# Patient Record
Sex: FEMALE | Race: WHITE | NOT HISPANIC OR LATINO | Employment: UNEMPLOYED | URBAN - METROPOLITAN AREA
[De-identification: names, ages, dates, MRNs, and addresses within clinical notes are randomized per-mention and may not be internally consistent; named-entity substitution may affect disease eponyms.]

---

## 2017-01-26 ENCOUNTER — ALLSCRIPTS OFFICE VISIT (OUTPATIENT)
Dept: OTHER | Facility: OTHER | Age: 57
End: 2017-01-26

## 2017-02-24 ENCOUNTER — ALLSCRIPTS OFFICE VISIT (OUTPATIENT)
Dept: OTHER | Facility: OTHER | Age: 57
End: 2017-02-24

## 2017-07-14 ENCOUNTER — ALLSCRIPTS OFFICE VISIT (OUTPATIENT)
Dept: OTHER | Facility: OTHER | Age: 57
End: 2017-07-14

## 2017-07-14 DIAGNOSIS — F41.9 ANXIETY DISORDER: ICD-10-CM

## 2017-07-14 DIAGNOSIS — E78.49 OTHER HYPERLIPIDEMIA: ICD-10-CM

## 2017-07-14 DIAGNOSIS — F32.9 MAJOR DEPRESSIVE DISORDER, SINGLE EPISODE: ICD-10-CM

## 2017-07-14 DIAGNOSIS — R73.9 HYPERGLYCEMIA: ICD-10-CM

## 2017-07-14 DIAGNOSIS — E78.5 HYPERLIPIDEMIA: ICD-10-CM

## 2017-07-14 DIAGNOSIS — I10 ESSENTIAL (PRIMARY) HYPERTENSION: ICD-10-CM
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
## 2017-08-01 ENCOUNTER — ALLSCRIPTS OFFICE VISIT (OUTPATIENT)
Dept: OTHER | Facility: OTHER | Age: 57
End: 2017-08-01

## 2017-09-25 ENCOUNTER — ALLSCRIPTS OFFICE VISIT (OUTPATIENT)
Dept: OTHER | Facility: OTHER | Age: 57
End: 2017-09-25

## 2017-09-25 DIAGNOSIS — R10.2 PELVIC AND PERINEAL PAIN: ICD-10-CM

## 2017-09-25 DIAGNOSIS — Z12.31 ENCOUNTER FOR SCREENING MAMMOGRAM FOR MALIGNANT NEOPLASM OF BREAST: ICD-10-CM

## 2017-09-28 ENCOUNTER — GENERIC CONVERSION - ENCOUNTER (OUTPATIENT)
Dept: OTHER | Facility: OTHER | Age: 57
End: 2017-09-28

## 2017-09-28 LAB
ADDITIONAL INFORMATION (HISTORICAL): NORMAL
ADEQUACY: (HISTORICAL): NORMAL
CYTOTECHNOLOGIST: (HISTORICAL): NORMAL
HPV MRNA E6/E7 (HISTORICAL): NOT DETECTED
INTERPRETATION (HISTORICAL): NORMAL
LMP (HISTORICAL): NORMAL
PREV. BX: (HISTORICAL): NORMAL
PREV. PAP (HISTORICAL): NORMAL
SOURCE (HISTORICAL): NORMAL

## 2017-11-15 ENCOUNTER — LAB CONVERSION - ENCOUNTER (OUTPATIENT)
Dept: OTHER | Facility: OTHER | Age: 57
End: 2017-11-15

## 2017-11-15 ENCOUNTER — GENERIC CONVERSION - ENCOUNTER (OUTPATIENT)
Dept: OTHER | Facility: OTHER | Age: 57
End: 2017-11-15

## 2017-11-15 DIAGNOSIS — I20.9 ANGINA PECTORIS (HCC): ICD-10-CM

## 2017-11-15 DIAGNOSIS — E78.49 OTHER HYPERLIPIDEMIA: ICD-10-CM

## 2017-11-15 LAB
A/G RATIO (HISTORICAL): 1.8 (CALC) (ref 1–2.5)
ALBUMIN SERPL BCP-MCNC: 4.5 G/DL (ref 3.6–5.1)
ALP SERPL-CCNC: 75 U/L (ref 33–130)
ALT SERPL W P-5'-P-CCNC: 12 U/L (ref 6–29)
AST SERPL W P-5'-P-CCNC: 13 U/L (ref 10–35)
BASOPHILS # BLD AUTO: 1 %
BASOPHILS # BLD AUTO: 52 CELLS/UL (ref 0–200)
BILIRUB SERPL-MCNC: 0.3 MG/DL (ref 0.2–1.2)
BUN SERPL-MCNC: 24 MG/DL (ref 7–25)
BUN/CREA RATIO (HISTORICAL): ABNORMAL (CALC) (ref 6–22)
C-REACTIVE PROTEIN (HISTORICAL): 3.9 MG/L
CALCIUM SERPL-MCNC: 9.4 MG/DL (ref 8.6–10.4)
CHLORIDE SERPL-SCNC: 104 MMOL/L (ref 98–110)
CHOLEST SERPL-MCNC: 413 MG/DL
CHOLEST/HDLC SERPL: 4.4 (CALC)
CO2 SERPL-SCNC: 28 MMOL/L (ref 20–31)
CREAT SERPL-MCNC: 0.83 MG/DL (ref 0.5–1.05)
DEPRECATED RDW RBC AUTO: 11.9 % (ref 11–15)
EGFR AFRICAN AMERICAN (HISTORICAL): 89 ML/MIN/1.73M2
EGFR-AMERICAN CALC (HISTORICAL): 77 ML/MIN/1.73M2
EOSINOPHIL # BLD AUTO: 333 CELLS/UL (ref 15–500)
EOSINOPHIL # BLD AUTO: 6.4 %
ERYTHROCYTE SEDIMENTATION RATE (HISTORICAL): 11 MM/H
GAMMA GLOBULIN (HISTORICAL): 2.5 G/DL (CALC) (ref 1.9–3.7)
GLUCOSE (HISTORICAL): 104 MG/DL (ref 65–99)
HBA1C MFR BLD HPLC: 4.9 % OF TOTAL HGB
HCT VFR BLD AUTO: 34 % (ref 35–45)
HDLC SERPL-MCNC: 94 MG/DL
HGB BLD-MCNC: 11.6 G/DL (ref 11.7–15.5)
LDL CHOLESTEROL (HISTORICAL): 287 MG/DL (CALC)
LYME IGG/IGM AB (HISTORICAL): <0.9 INDEX
LYMPHOCYTES # BLD AUTO: 1264 CELLS/UL (ref 850–3900)
LYMPHOCYTES # BLD AUTO: 24.3 %
MCH RBC QN AUTO: 31.4 PG (ref 27–33)
MCHC RBC AUTO-ENTMCNC: 34.1 G/DL (ref 32–36)
MCV RBC AUTO: 92.1 FL (ref 80–100)
MONOCYTES # BLD AUTO: 380 CELLS/UL (ref 200–950)
MONOCYTES (HISTORICAL): 7.3 %
NEUTROPHILS # BLD AUTO: 3172 CELLS/UL (ref 1500–7800)
NEUTROPHILS # BLD AUTO: 61 %
NON-HDL-CHOL (CHOL-HDL) (HISTORICAL): 319 MG/DL (CALC)
PLATELET # BLD AUTO: 261 THOUSAND/UL (ref 140–400)
PMV BLD AUTO: 9.5 FL (ref 7.5–12.5)
POTASSIUM SERPL-SCNC: 4 MMOL/L (ref 3.5–5.3)
RBC # BLD AUTO: 3.69 MILLION/UL (ref 3.8–5.1)
SODIUM SERPL-SCNC: 139 MMOL/L (ref 135–146)
TOTAL PROTEIN (HISTORICAL): 7 G/DL (ref 6.1–8.1)
TRIGL SERPL-MCNC: 159 MG/DL
TSH SERPL DL<=0.05 MIU/L-ACNC: 0.84 MIU/L (ref 0.4–4.5)
WBC # BLD AUTO: 5.2 THOUSAND/UL (ref 3.8–10.8)

## 2018-01-09 NOTE — RESULT NOTES
Message   Please call patient  Patient's Echo showed some minor things (e g  mild to moderate leaky aortic valve), but nothing dangerous or related to her symptoms  Can discuss more fully at her next visit  Thanks  NET     Verified Results  ECHO COMPLETE WITH CONTRAST IF INDICATED [id number] 12:33PM Jarod An Order Number: [id number]   Performing Comments: No contrast needed  - Patient Instructions: To schedule this appointment, please contact Central Scheduling at [phone]  For Frankey Dodrill, please call [phone]  Test Name Result Flag Reference   ECHO COMPLETE WITH CONTRAST IF INDICATED (Report)     [facility]   [facility], [address] [place] [address]   [phone]     Transthoracic Echocardiogram   2D, M-mode, Doppler, and Color Doppler     Study date: 2016     Patient: Essence Hooks   MR number: [id number]   Account number: [de-identified]   : 1960   Age: 60 years   Gender: Female   Status: Outpatient   Location: [address] [place] [address] and [facility]   Height: 65 in   Weight: 183 7 lb   BP: 128/ 80 mmHg     Indications: Amaurosis fugax     Diagnoses: G45 3 - Amaurosis fugax     Sonographer: PA Morrell   Primary Physician: Thiago Ashton MD   Referring Physician: Thiago Ashton MD   Group: [facility]   Interpreting Physician: Nichol Johns MD     SUMMARY     LEFT VENTRICLE:   Size was at the upper limits of normal  (79GJ)   Systolic function was normal  Ejection fraction was estimated to be 65 %  There were no regional wall motion abnormalities  MITRAL VALVE:   There was mild regurgitation  AORTIC VALVE:   There was mild to moderate regurgitation  TRICUSPID VALVE:   There was trace regurgitation  HISTORY: PRIOR HISTORY: Hypertension     PROCEDURE: The study was performed in the [address] [facility]  This was a routine study  The transthoracic approach was used   The study   included complete 2D imaging, M-mode, complete spectral Doppler, and color   Doppler  The heart rate was 57 bpm, at the start of the study  Images were   obtained from the parasternal, apical, subcostal, and suprasternal notch   acoustic windows  Image quality was adequate  LEFT VENTRICLE: Size was at the upper limits of normal  (80UQ) Systolic   function was normal  Ejection fraction was estimated to be 65 %  There were no   regional wall motion abnormalities  Wall thickness was normal  DOPPLER: Left   ventricular diastolic function parameters were normal      RIGHT VENTRICLE: The size was normal  Systolic function was normal  Wall   thickness was normal      LEFT ATRIUM: Size was normal      RIGHT ATRIUM: Size was normal      MITRAL VALVE: Valve structure was normal  There was normal leaflet separation  DOPPLER: The transmitral velocity was within the normal range  There was no   evidence for stenosis  There was mild regurgitation  AORTIC VALVE: The valve was trileaflet  Leaflets exhibited normal thickness and   normal cuspal separation  DOPPLER: Transaortic velocity was within the normal   range  There was no evidence for stenosis  There was mild to moderate   regurgitation  TRICUSPID VALVE: The valve structure was normal  There was normal leaflet   separation  DOPPLER: The transtricuspid velocity was within the normal range  There was no evidence for stenosis  There was trace regurgitation  PULMONIC VALVE: Leaflets exhibited normal thickness, no calcification, and   normal cuspal separation  DOPPLER: The transpulmonic velocity was within the   normal range  There was no regurgitation  PERICARDIUM: There was no pericardial effusion  AORTA: The root exhibited normal size  SYSTEMIC VEINS: IVC: The inferior vena cava was normal in size and course     Respirophasic changes were normal      SYSTEM MEASUREMENT TABLES     2D   %FS: 38 72 %   Ao Diam: 3 82 cm   EDV(Teich): 156 62 ml   EF Biplane: 66 98 %   EF(Cube): 76 99 %   EF(Teich): 68 4 %   ESV(Cube): 41 43 ml   ESV(Teich): 49 49 ml   IVSd: 0 9 cm   LA Area: 17 52 cm2   LA Diam: 4 14 cm   LVEDV MOD A2C: 71 23 ml   LVEDV MOD A4C: 68 53 ml   LVEDV MOD BP: 75 8 ml   LVEF MOD A2C: 67 1 %   LVEF MOD A4C: 61 41 %   LVESV MOD A2C: 23 43 ml   LVESV MOD A4C: 26 45 ml   LVESV MOD BP: 25 03 ml   LVIDd: 5 65 cm   LVIDs: 3 46 cm   LVLd A2C: 7 63 cm   LVLd A4C: 6 9 cm   LVLs A2C: 6 01 cm   LVLs A4C: 5 92 cm   LVPWd: 1 01 cm   RA Area: 13 45 cm2   RV Diam : 3 22 cm   SI(Cube): 72 57 ml/m2   SI(Teich): 56 09 ml/m2   SV MOD A2C: 47 8 ml   SV MOD A4C: 42 09 ml   SV(Cube): 138 61 ml   SV(Teich): 107 13 ml     CW   AR Dec [place]: 2 32 m/s2   AR Dec Time: 2037 79 ms   AR PHT: 590 96 ms   AR Vmax: 4 72 m/s   AR maxP 13 mmHg     MM   TAPSE: 2 63 cm     PW   E': 0 06 m/s   E/E': 11 14   MV A Johan: 0 65 m/s   MV Dec [place]: 3 91 m/s2   MV DecT: 179 98 ms   MV E Johan: 0 7 m/s   MV E/A Ratio: 1 08     Intersocietal Commission Accredited Echocardiography Laboratory     Prepared and electronically signed by     Imani Trivedi MD   Signed [id number] 14:54:20       Signatures   Electronically signed by : PRISCILA Perez ; Dec  1 2016 10:32AM EST                       (Author)

## 2018-01-13 VITALS
WEIGHT: 174.38 LBS | BODY MASS INDEX: 29.05 KG/M2 | HEIGHT: 65 IN | SYSTOLIC BLOOD PRESSURE: 120 MMHG | DIASTOLIC BLOOD PRESSURE: 80 MMHG

## 2018-01-13 VITALS
HEIGHT: 65 IN | WEIGHT: 176.5 LBS | DIASTOLIC BLOOD PRESSURE: 80 MMHG | BODY MASS INDEX: 29.41 KG/M2 | TEMPERATURE: 94 F | SYSTOLIC BLOOD PRESSURE: 136 MMHG

## 2018-01-13 VITALS
HEIGHT: 65 IN | BODY MASS INDEX: 29.37 KG/M2 | WEIGHT: 176.25 LBS | SYSTOLIC BLOOD PRESSURE: 118 MMHG | DIASTOLIC BLOOD PRESSURE: 70 MMHG

## 2018-01-13 NOTE — RESULT NOTES
Verified Results  (Q) THINPREP PAP AND HPV mRNA E6/E7 [id number] 12:00AM Adalidjax Anny     Test Name Result Flag Reference   HPV mRNA E6/E7 Not Detected  Not Detected   This test was performed using the APTIMA HPV Assay ([employer] )  This assay detects E6/E7 viral messenger RNA (mRNA) from 14  high-risk HPV types (16,18,31,33,35,39,45,51,52,56,58,59,66,68)  SOURCE:      [employer]   CLINICAL INFORMATION:      None given   LMP:      NONE GIVEN   PREV  PAP:      NONE GIVEN   PREV  BX:      NONE GIVEN   STATEMENT OF ADEQUACY:      Satisfactory for evaluation  Endocervical/transformation zone component  present  Age and/or menstrual status not provided   INTERPRETATION/RESULT:      Negative for intraepithelial lesion or malignancy     CYTOTECHNOLOGIST:      DAVID GONZALEZ(ASCP)  CT screening location: [facility], [address] [place] [address]

## 2018-01-14 VITALS
SYSTOLIC BLOOD PRESSURE: 128 MMHG | DIASTOLIC BLOOD PRESSURE: 80 MMHG | HEIGHT: 65 IN | BODY MASS INDEX: 2.85 KG/M2 | WEIGHT: 17.09 LBS

## 2018-01-18 NOTE — RESULT NOTES
Message   Please call patient  Carotid dopplers were normal  thanks  NET     Verified Results  ECHO COMPLETE WITH CONTRAST IF INDICATED [id number] 12:33PM Zee Sabillon Order Number: [id number]   Performing Comments: No contrast needed  - Patient Instructions: To schedule this appointment, please contact Central Scheduling at [phone]  For Leslye Vergara, please call [phone]  Test Name Result Flag Reference   ECHO COMPLETE WITH CONTRAST IF INDICATED (Report)     [facility]   [facility], [address] [facility]   [phone]     Transthoracic Echocardiogram   2D, M-mode, Doppler, and Color Doppler     Study date: 2016     Patient: Debbie Hyman   MR number: [id number]   Account number: [de-identified]   : 1960   Age: 60 years   Gender: Female   Status: Outpatient   Location: [address] [place] [address] [facility] [place]   Height: 65 in   Weight: 183 7 lb   BP: 128/ 80 mmHg     Indications: Amaurosis fugax     Diagnoses: G45 3 - Amaurosis fugax     Sonographer: PA Dias   Primary Physician: Morenita Rose MD   Referring Physician: Morenita Rose MD   Group: [facility]   Interpreting Physician: Bobby Brewer MD     SUMMARY     LEFT VENTRICLE:   Size was at the upper limits of normal  (76XU)   Systolic function was normal  Ejection fraction was estimated to be 65 %  There were no regional wall motion abnormalities  MITRAL VALVE:   There was mild regurgitation  AORTIC VALVE:   There was mild to moderate regurgitation  TRICUSPID VALVE:   There was trace regurgitation  HISTORY: PRIOR HISTORY: Hypertension     PROCEDURE: The study was performed in the [address]  This was a routine study  The transthoracic approach was used  The study   included complete 2D imaging, M-mode, complete spectral Doppler, and color   Doppler  The heart rate was 57 bpm, at the start of the study  Images were   obtained from the parasternal, apical, subcostal, and suprasternal notch   acoustic windows  Image quality was adequate  LEFT VENTRICLE: Size was at the upper limits of normal  (54JY) Systolic   function was normal  Ejection fraction was estimated to be 65 %  There were no   regional wall motion abnormalities  Wall thickness was normal  DOPPLER: Left   ventricular diastolic function parameters were normal      RIGHT VENTRICLE: The size was normal  Systolic function was normal  Wall   thickness was normal      LEFT ATRIUM: Size was normal      RIGHT ATRIUM: Size was normal      MITRAL VALVE: Valve structure was normal  There was normal leaflet separation  DOPPLER: The transmitral velocity was within the normal range  There was no   evidence for stenosis  There was mild regurgitation  AORTIC VALVE: The valve was trileaflet  Leaflets exhibited normal thickness and   normal cuspal separation  DOPPLER: Transaortic velocity was within the normal   range  There was no evidence for stenosis  There was mild to moderate   regurgitation  TRICUSPID VALVE: The valve structure was normal  There was normal leaflet   separation  DOPPLER: The transtricuspid velocity was within the normal range  There was no evidence for stenosis  There was trace regurgitation  PULMONIC VALVE: Leaflets exhibited normal thickness, no calcification, and   normal cuspal separation  DOPPLER: The transpulmonic velocity was within the   normal range  There was no regurgitation  PERICARDIUM: There was no pericardial effusion  AORTA: The root exhibited normal size  SYSTEMIC VEINS: IVC: The inferior vena cava was normal in size and course     Respirophasic changes were normal      SYSTEM MEASUREMENT TABLES     2D   %FS: 38 72 %   Ao Diam: 3 82 cm   EDV(Teich): 156 62 ml   EF Biplane: 66 98 %   EF(Cube): 76 99 %   EF(Teich): 68 4 %   ESV(Cube): 41 43 ml   ESV(Teich): 49 49 ml   IVSd: 0 9 cm   LA Area: 17 52 cm2   LA Diam: 4 14 cm   LVEDV MOD A2C: 71 23 ml   LVEDV MOD A4C: 68 53 ml   LVEDV MOD BP: 75 8 ml   LVEF MOD A2C: 67 1 %   LVEF MOD A4C: 61 41 %   LVESV MOD A2C: 23 43 ml   LVESV MOD A4C: 26 45 ml   LVESV MOD BP: 25 03 ml   LVIDd: 5 65 cm   LVIDs: 3 46 cm   LVLd A2C: 7 63 cm   LVLd A4C: 6 9 cm   LVLs A2C: 6 01 cm   LVLs A4C: 5 92 cm   LVPWd: 1 01 cm   RA Area: 13 45 cm2   RV Diam : 3 22 cm   SI(Cube): 72 57 ml/m2   SI(Teich): 56 09 ml/m2   SV MOD A2C: 47 8 ml   SV MOD A4C: 42 09 ml   SV(Cube): 138 61 ml   SV(Teich): 107 13 ml     CW   AR Dec [place]: 2 32 m/s2   AR Dec Time: 2037 79 ms   AR PHT: 590 96 ms   AR Vmax: 4 72 m/s   AR maxP 13 mmHg     MM   TAPSE: 2 63 cm     PW   E': 0 06 m/s   E/E': 11 14   MV A Johan: 0 65 m/s   MV Dec [place]: 3 91 m/s2   MV DecT: 179 98 ms   MV E Johan: 0 7 m/s   MV E/A Ratio: 1 08     [employer]tersoci[employer] Commission Accredited Echocardiography Laboratory     Prepared and electronically signed by     Sol Kellogg MD   Signed [id number] 14:54:20     VAS CAROTID COMPLETE STUDY [id number] 12:33PM Florianeric Andrés Order Number: [id number]    - Patient Instructions: To schedule this appointment, please contact Central Scheduling at [phone]  Test Name Result Flag Reference   VAS CAROTID COMPLETE STUDY (Report)     [facility]   CLINICAL:   Indications: Carotid disease w/o CVA [I65 29]  I hear my heartbeat in my left   ear every morning, I sometimes get floaters in both of my eyes and migraines   denies dizziness HTN   Operative History   Laparoscopic Surgery for infertility   Risk Factors   The patient has history of HTN, hyperlipidemia and smoking (current) 0 ppd  Clinical   Right Pressure: 140/80 mm Hg, Left Pressure: 140/80 mm Hg  FINDINGS:      Right    Impression PSV EDV (cm/s) Ratio    Dist  ICA         57     21  0 88    Mid  ICA         54     19  0 84    Prox   ICA  1 - 49%   51     13  0 79    Dist CCA         57     18        Mid CCA          64     19 0 99    Prox CCA         65     15        Ext Carotid        61     10  0 94    Prox Vert         64      0        Subclavian        95      0           Left     Impression PSV EDV (cm/s) Ratio    Dist  ICA         52     20  0 64    Mid  ICA         57     17  0 70    Prox  ICA  1 - 49%   63     21  0 78    Dist CCA         92     29        Mid CCA          81     21  1 19    Prox CCA         68     18        Ext Carotid        71     12  0 88    Prox Vert         44     10        Subclavian        142      0                 CONCLUSION:      Impression   RIGHT:   There is <50% stenosis noted in the internal carotid artery  Plaque is   homogenous and smooth  Vertebral artery flow is antegrade  There is no significant subclavian artery   disease  LEFT:   There is <50% stenosis noted in the internal carotid artery  Plaque is   homogenous and smooth  Vertebral artery flow is antegrade  There is no significant subclavian artery   disease  Compared to previous study on 7/1/2010 , there is no change in arterial   perfusion to the carotid arteries  Internal carotid artery stenosis determination by consensus criteria from:   Julien Burleson , et al  Carotid Artery Stenosis: Gray-Scale and Doppler US Diagnosis   - Society of Radiologists in [address] [place] [address], Radiology 2003;   [phone]        SIGNATURE:   Electronically Signed by: Yuri Barrett on 2016-11-30 04:42:18 PM       Signatures   Electronically signed by : PRISCILA Tomlinson ; Dec  1 2016 10:33AM EST                       (Author)

## 2018-01-22 VITALS
RESPIRATION RATE: 16 BRPM | SYSTOLIC BLOOD PRESSURE: 132 MMHG | BODY MASS INDEX: 29.66 KG/M2 | HEART RATE: 66 BPM | HEIGHT: 65 IN | WEIGHT: 178 LBS | DIASTOLIC BLOOD PRESSURE: 64 MMHG

## 2018-01-22 VITALS
BODY MASS INDEX: 28.49 KG/M2 | DIASTOLIC BLOOD PRESSURE: 70 MMHG | SYSTOLIC BLOOD PRESSURE: 112 MMHG | WEIGHT: 171 LBS | HEIGHT: 65 IN

## 2018-02-27 ENCOUNTER — OFFICE VISIT (OUTPATIENT)
Dept: FAMILY MEDICINE CLINIC | Facility: CLINIC | Age: 58
End: 2018-02-27
Payer: COMMERCIAL

## 2018-02-27 VITALS
WEIGHT: 177 LBS | OXYGEN SATURATION: 98 % | DIASTOLIC BLOOD PRESSURE: 78 MMHG | HEIGHT: 65 IN | SYSTOLIC BLOOD PRESSURE: 118 MMHG | HEART RATE: 66 BPM | BODY MASS INDEX: 29.49 KG/M2 | TEMPERATURE: 98.2 F

## 2018-02-27 DIAGNOSIS — F32.A ANXIETY AND DEPRESSION: ICD-10-CM

## 2018-02-27 DIAGNOSIS — F41.9 ANXIETY AND DEPRESSION: ICD-10-CM

## 2018-02-27 DIAGNOSIS — G45.3 BILATERAL AMAUROSIS FUGAX: Primary | ICD-10-CM

## 2018-02-27 DIAGNOSIS — E78.49 FAMILIAL HYPERLIPIDEMIA: ICD-10-CM

## 2018-02-27 DIAGNOSIS — E78.9 LIPID DISORDER: ICD-10-CM

## 2018-02-27 DIAGNOSIS — I10 ESSENTIAL HYPERTENSION: ICD-10-CM

## 2018-02-27 PROBLEM — J06.9 ACUTE UPPER RESPIRATORY INFECTION: Status: ACTIVE | Noted: 2017-02-24

## 2018-02-27 PROBLEM — R41.840 ATTENTION DISTURBANCE: Status: ACTIVE | Noted: 2017-01-26
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
## 2018-02-27 PROBLEM — I20.9 ANGINA PECTORIS (HCC): Status: ACTIVE | Noted: 2017-11-15

## 2018-02-27 PROBLEM — R10.2 FEMALE PELVIC PAIN: Status: ACTIVE | Noted: 2017-09-25

## 2018-02-27 PROBLEM — I87.2 VENOUS INSUFFICIENCY: Status: ACTIVE | Noted: 2017-08-01

## 2018-02-27 PROBLEM — M19.90 ARTHRITIS: Status: ACTIVE | Noted: 2017-08-01

## 2018-02-27 PROCEDURE — 99214 OFFICE O/P EST MOD 30 MIN: CPT | Performed by: FAMILY MEDICINE

## 2018-02-27 RX ORDER — VALSARTAN 160 MG/1
160 TABLET ORAL DAILY
Qty: 30 TABLET | Refills: 0 | Status: SHIPPED | OUTPATIENT
Start: 2018-02-27 | End: 2018-07-23

## 2018-02-27 NOTE — PROGRESS NOTES
Assessment/Plan:    67-year-old woman with:  Familial hyperlipidemia in the setting of bilateral amaurosis fugax  Hypertension and anxiety depression  Discussed supportive care return parameters  Will refer to Cardiology  Will try patient on valsartan without hydrochlorothiazide and patient to call back with blood pressure readings  Will continue other medications and follow-up in 3 months  No problem-specific Assessment & Plan notes found for this encounter  Diagnoses and all orders for this visit:    Bilateral amaurosis fugax  -     Ambulatory referral to Cardiology; Future    Lipid disorder  -     Ambulatory referral to Cardiology; Future    Familial hyperlipidemia  -     Ambulatory referral to Cardiology; Future    Essential hypertension  -     valsartan (DIOVAN) 160 mg tablet; Take 1 tablet (160 mg total) by mouth daily          Subjective:   Chief Complaint   Patient presents with    Follow-up     3 month [facility]          Patient ID: Nita Lozano is a 59 y o  female  Patient is a 67-year-old woman who presents for follow-up  Patient has significant familial hypercholesterolemia and episode of bilateral amaurosis fugax  Patient was ordered for Radha Cordero for trial of PCSK9 inhibitor therapy and insurance refused to cover it  Will refer to Cardiology to see if there successful  Patient admits her blood pressure was better controlled on the valsartan without the hydrochlorothiazide she was she would like to try this and plans to call back with her blood pressure readings  No other acute complaints at this time  Patient also admits her anxiety and depression are stable as well  The following portions of the patient's history were reviewed and updated as appropriate: allergies, current medications, past family history, past medical history, past social history, past surgical history and problem list     Review of Systems   Constitutional: Negative  HENT: Negative      Eyes: Negative  Respiratory: Negative  Cardiovascular: Negative  Gastrointestinal: Negative  Endocrine: Negative  Genitourinary: Negative  Musculoskeletal: Negative  Allergic/Immunologic: Negative  Neurological: Negative  Hematological: Negative  Psychiatric/Behavioral: Negative  All other systems reviewed and are negative  Objective:      /78 (BP Location: Right arm, Patient Position: Sitting, Cuff Size: Standard)   Pulse 66   Temp 98 2 °F (36 8 °C) (Tympanic)   Ht 5' 4 5" (1 638 m)   Wt 80 3 kg (177 lb)   LMP  (LMP Unknown)   SpO2 98%   Breastfeeding? No   BMI 29 91 kg/m²          Physical Exam   Constitutional: She is oriented to person, place, and time  She appears well-developed and well-nourished  HENT:   Head: Atraumatic  Right Ear: External ear normal    Left Ear: External ear normal    Eyes: Conjunctivae and EOM are normal  Pupils are equal, round, and reactive to light  Neck: Normal range of motion  Cardiovascular: Normal rate, regular rhythm and normal heart sounds  Pulmonary/Chest: Effort normal and breath sounds normal  No respiratory distress  Abdominal: Soft  Bowel sounds are normal  She exhibits no distension  There is no tenderness  There is no rebound and no guarding  Musculoskeletal: Normal range of motion  Neurological: She is alert and oriented to person, place, and time  No cranial nerve deficit  Skin: Skin is warm and dry  Psychiatric: She has a normal mood and affect   Her behavior is normal  Judgment and thought content normal

## 2018-03-21 ENCOUNTER — OFFICE VISIT (OUTPATIENT)
Dept: FAMILY MEDICINE CLINIC | Facility: CLINIC | Age: 58
End: 2018-03-21
Payer: COMMERCIAL

## 2018-03-21 VITALS
SYSTOLIC BLOOD PRESSURE: 128 MMHG | BODY MASS INDEX: 30.22 KG/M2 | DIASTOLIC BLOOD PRESSURE: 80 MMHG | HEIGHT: 64 IN | WEIGHT: 177 LBS | TEMPERATURE: 97.8 F

## 2018-03-21 DIAGNOSIS — K57.32 DIVERTICULITIS OF COLON: Primary | ICD-10-CM

## 2018-03-21 PROCEDURE — 99213 OFFICE O/P EST LOW 20 MIN: CPT | Performed by: FAMILY MEDICINE

## 2018-03-21 RX ORDER — METRONIDAZOLE 500 MG/1
500 TABLET ORAL EVERY 12 HOURS SCHEDULED
Qty: 20 TABLET | Refills: 0 | Status: SHIPPED | OUTPATIENT
Start: 2018-03-21 | End: 2018-03-31

## 2018-03-21 RX ORDER — TRAMADOL HYDROCHLORIDE 50 MG/1
50 TABLET ORAL EVERY 6 HOURS PRN
Qty: 30 TABLET | Refills: 0 | Status: SHIPPED | OUTPATIENT
Start: 2018-03-21 | End: 2018-08-13

## 2018-03-21 RX ORDER — CIPROFLOXACIN 500 MG/1
500 TABLET, FILM COATED ORAL EVERY 12 HOURS SCHEDULED
Qty: 20 TABLET | Refills: 0 | Status: SHIPPED | OUTPATIENT
Start: 2018-03-21 | End: 2018-03-31

## 2018-03-21 NOTE — PROGRESS NOTES
Assessment/Plan:    58 y/o woman with: Diverticulitis  Discussed supportive care and return parameters  Will give Cipro and Flagyl with Tramadol for breakthrough pain  Will refer to GI for follow-up after resolution  Discussed that if symptoms are not improving or if they worsen she must call back or go to the ED  No problem-specific Assessment & Plan notes found for this encounter  Diagnoses and all orders for this visit:    Diverticulitis of colon  -     ciprofloxacin (CIPRO) 500 mg tablet; Take 1 tablet (500 mg total) by mouth every 12 (twelve) hours for 10 days  -     metroNIDAZOLE (FLAGYL) 500 mg tablet; Take 1 tablet (500 mg total) by mouth every 12 (twelve) hours for 10 days  -     traMADol (ULTRAM) 50 mg tablet; Take 1 tablet (50 mg total) by mouth every 6 (six) hours as needed for moderate pain  -     Ambulatory referral to Gastroenterology; Future          Subjective:   Chief Complaint   Patient presents with    Diverticulitis     flair up on the right side for the past two and a half days  Patient ID: Shanna Barnett is a 59 y o  female  Patient is a 58 y/o woman who presents c/o several days of RLQ pain identical to past episodes of diverticulitis  No fevers, chills, nausea or vomiting, some constipation  Tolerating pO intake  The following portions of the patient's history were reviewed and updated as appropriate: allergies, current medications, past family history, past medical history, past social history, past surgical history and problem list     Review of Systems   Constitutional: Negative  HENT: Negative  Eyes: Negative  Respiratory: Negative  Cardiovascular: Negative  Gastrointestinal: Positive for abdominal pain and constipation  Negative for diarrhea, nausea and vomiting  Endocrine: Negative  Genitourinary: Negative  Musculoskeletal: Negative  Allergic/Immunologic: Negative  Neurological: Negative  Hematological: Negative  Psychiatric/Behavioral: Negative  All other systems reviewed and are negative  Objective:      /80   Temp 97 8 °F (36 6 °C)   Ht 5' 4" (1 626 m)   Wt 80 3 kg (177 lb)   LMP  (LMP Unknown)   BMI 30 38 kg/m²          Physical Exam   Constitutional: She is oriented to person, place, and time  She appears well-developed and well-nourished  HENT:   Head: Atraumatic  Right Ear: External ear normal    Left Ear: External ear normal    Eyes: Conjunctivae and EOM are normal  Pupils are equal, round, and reactive to light  Neck: Normal range of motion  Cardiovascular: Normal rate, regular rhythm and normal heart sounds  Pulmonary/Chest: Effort normal and breath sounds normal  No respiratory distress  Abdominal: Soft  Bowel sounds are normal  She exhibits no distension  There is tenderness  There is no rebound and no guarding  RLQ tenderness, no rebound or guarding  Musculoskeletal: Normal range of motion  Neurological: She is alert and oriented to person, place, and time  No cranial nerve deficit  Skin: Skin is warm and dry  Psychiatric: She has a normal mood and affect   Her behavior is normal  Judgment and thought content normal

## 2018-03-22 ENCOUNTER — TELEPHONE (OUTPATIENT)
Dept: FAMILY MEDICINE CLINIC | Facility: CLINIC | Age: 58
End: 2018-03-22

## 2018-07-16 ENCOUNTER — TELEPHONE (OUTPATIENT)
Dept: FAMILY MEDICINE CLINIC | Facility: CLINIC | Age: 58
End: 2018-07-16

## 2018-07-16 DIAGNOSIS — I10 ESSENTIAL HYPERTENSION: Primary | ICD-10-CM

## 2018-07-16 RX ORDER — LOSARTAN POTASSIUM 50 MG/1
50 TABLET ORAL DAILY
Qty: 30 TABLET | Refills: 2 | Status: SHIPPED | OUTPATIENT
Start: 2018-07-16 | End: 2018-07-23

## 2018-07-16 NOTE — TELEPHONE ENCOUNTER
Pt called stating that there is a recall on her valsartan-hydrochlorothiazide     Please write new script

## 2018-07-23 ENCOUNTER — OFFICE VISIT (OUTPATIENT)
Dept: FAMILY MEDICINE CLINIC | Facility: CLINIC | Age: 58
End: 2018-07-23
Payer: COMMERCIAL

## 2018-07-23 VITALS
DIASTOLIC BLOOD PRESSURE: 80 MMHG | WEIGHT: 175.4 LBS | HEIGHT: 64 IN | BODY MASS INDEX: 29.94 KG/M2 | SYSTOLIC BLOOD PRESSURE: 160 MMHG

## 2018-07-23 DIAGNOSIS — I10 BENIGN ESSENTIAL HYPERTENSION: Primary | ICD-10-CM

## 2018-07-23 PROCEDURE — 99213 OFFICE O/P EST LOW 20 MIN: CPT | Performed by: FAMILY MEDICINE

## 2018-07-23 RX ORDER — PANTOPRAZOLE SODIUM 40 MG/1
40 TABLET, DELAYED RELEASE ORAL DAILY
Qty: 90 TABLET | Refills: 1 | Status: ON HOLD | OUTPATIENT
Start: 2018-07-23 | End: 2018-10-20

## 2018-07-23 RX ORDER — CELECOXIB 100 MG/1
100 CAPSULE ORAL DAILY
COMMUNITY
End: 2018-12-03

## 2018-07-23 RX ORDER — OLMESARTAN MEDOXOMIL 20 MG/1
20 TABLET ORAL DAILY
Qty: 30 TABLET | Refills: 2 | Status: SHIPPED | OUTPATIENT
Start: 2018-07-23 | End: 2018-08-13

## 2018-07-23 RX ORDER — FLUTICASONE FUROATE AND VILANTEROL 200; 25 UG/1; UG/1
1 POWDER RESPIRATORY (INHALATION) DAILY
COMMUNITY
End: 2020-01-22

## 2018-07-23 RX ORDER — RANITIDINE 300 MG/1
300 TABLET ORAL 2 TIMES DAILY
COMMUNITY
End: 2018-07-23

## 2018-07-23 RX ORDER — IBUPROFEN 800 MG/1
TABLET ORAL 2 TIMES DAILY
COMMUNITY
End: 2018-11-15

## 2018-07-23 NOTE — PROGRESS NOTES
Assessment/Plan:   patient will stop losartan and start Benicar daily  Guidance given  Patient will continue with metoprolol  Patient will try pantoprazole daily in the morning and Zantac nightly  Follow-up in 2 weeks  Patient will reduce salt intake  Patient will keep blood pressure log at home  Patient go to ER if symptoms worsen       Diagnoses and all orders for this visit:    Benign essential hypertension  -     olmesartan (BENICAR) 20 mg tablet; Take 1 tablet (20 mg total) by mouth daily  -     pantoprazole (PROTONIX) 40 mg tablet; Take 1 tablet (40 mg total) by mouth daily  -     Comprehensive metabolic panel; Future  -     CBC and differential; Future  -     Lipid panel; Future  -     TSH, 3rd generation with Free T4 reflex; Future    Other orders  -     Discontinue: ranitidine (ZANTAC) 300 MG tablet; Take 300 mg by mouth 2 (two) times a day  -     ibuprofen (MOTRIN) 800 mg tablet; Take by mouth 2 (two) times a day  -     celecoxib (CeleBREX) 100 mg capsule; Take 100 mg by mouth daily  -     fluticasone-vilanterol (BREO ELLIPTA) 200-25 MCG/INH inhaler; Inhale 1 puff daily Rinse mouth after use  -     MULTIPLE VITAMIN PO; Take by mouth          Subjective:      Patient ID: Yousif Hernandez is a 59 y o  female  Patient is here to follow-up on hypertension  Patient's blood pressures have been elevated over the weekend  Blood pressures range from 479-733 systolic lay along with diastolics from   Patient has noticed some palpitations chest pain fogginess along with some difficulty sleeping and cough and some gastrointestinal issues  Patient was doing much better on valsartan but switched due to need to  The following portions of the patient's history were reviewed and updated as appropriate: allergies, current medications, past family history, past medical history, past social history, past surgical history and problem list     Review of Systems   Constitutional: Negative      HENT: Negative  Eyes: Negative  Respiratory: Positive for shortness of breath  Cardiovascular: Positive for chest pain and palpitations  Gastrointestinal: Negative  Endocrine: Negative  Genitourinary: Negative  Musculoskeletal: Negative  Skin: Negative  Allergic/Immunologic: Negative  Neurological: Negative  Hematological: Negative  Psychiatric/Behavioral: Negative  Objective:      /80 (BP Location: Right arm, Patient Position: Sitting, Cuff Size: Standard)   Ht 5' 4" (1 626 m)   Wt 79 6 kg (175 lb 6 4 oz)   BMI 30 11 kg/m²          Physical Exam   Cardiovascular: Normal rate and regular rhythm  Pulmonary/Chest: Effort normal and breath sounds normal    Nursing note and vitals reviewed

## 2018-08-09 DIAGNOSIS — I10 HYPERTENSION, UNSPECIFIED TYPE: Primary | ICD-10-CM

## 2018-08-09 RX ORDER — VALSARTAN 160 MG/1
160 TABLET ORAL DAILY
Qty: 90 TABLET | Refills: 1 | Status: CANCELLED | OUTPATIENT
Start: 2018-08-09

## 2018-08-09 NOTE — TELEPHONE ENCOUNTER
Spoke with Dr Nita Davison about my conversation with Alberto Travis and her wanting to stay on Valsartan  She feels no other medication has helped her BP  She informed me she still has Valsartan at home and her mail order pharmacy has Valsartan that is not a part of the recall  I will put in a new order dann Dr Nita Davison to approve and send to her mail order on file

## 2018-08-09 NOTE — TELEPHONE ENCOUNTER
Recommend going to losartan 100 mg daily x1 month and if she is doing well on it we can give her a larger supply

## 2018-08-10 RX ORDER — VALSARTAN 160 MG/1
160 TABLET ORAL DAILY
Qty: 90 TABLET | Refills: 0 | Status: SHIPPED | OUTPATIENT
Start: 2018-08-10 | End: 2018-11-05 | Stop reason: SDUPTHER

## 2018-08-13 ENCOUNTER — OFFICE VISIT (OUTPATIENT)
Dept: FAMILY MEDICINE CLINIC | Facility: CLINIC | Age: 58
End: 2018-08-13
Payer: COMMERCIAL

## 2018-08-13 VITALS
WEIGHT: 172.2 LBS | BODY MASS INDEX: 29.4 KG/M2 | HEART RATE: 64 BPM | DIASTOLIC BLOOD PRESSURE: 70 MMHG | SYSTOLIC BLOOD PRESSURE: 110 MMHG | OXYGEN SATURATION: 98 % | HEIGHT: 64 IN

## 2018-08-13 DIAGNOSIS — I10 ESSENTIAL HYPERTENSION: Primary | ICD-10-CM

## 2018-08-13 PROCEDURE — 3074F SYST BP LT 130 MM HG: CPT | Performed by: FAMILY MEDICINE

## 2018-08-13 PROCEDURE — 3008F BODY MASS INDEX DOCD: CPT | Performed by: FAMILY MEDICINE

## 2018-08-13 PROCEDURE — 3078F DIAST BP <80 MM HG: CPT | Performed by: FAMILY MEDICINE

## 2018-08-13 PROCEDURE — 99213 OFFICE O/P EST LOW 20 MIN: CPT | Performed by: FAMILY MEDICINE

## 2018-08-13 RX ORDER — HYDROCHLOROTHIAZIDE 12.5 MG/1
12.5 TABLET ORAL DAILY
Qty: 90 TABLET | Refills: 1
Start: 2018-08-13 | End: 2018-08-20 | Stop reason: SDUPTHER

## 2018-08-13 NOTE — PROGRESS NOTES
Assessment/Plan:    Patient will continue with valsartan 160 mg daily  This medication is without the pre cancerous ingredients  Patient will have hydrochlorothiazide 12 5 mg added to regimen for better blood pressure control  Patient will follow up in 3 months     Diagnoses and all orders for this visit:    Essential hypertension  -     hydrochlorothiazide (HYDRODIURIL) 12 5 mg tablet; Take 1 tablet (12 5 mg total) by mouth daily          Subjective:      Patient ID: Blanca Hills is a 59 y o  female  Patient is here to follow-up on hypertension  Patient had significant muscle discomfort with Benicar went back to valsartan 160 mg daily  Patient feeling back to baseline  Patient did use hydrochlorothiazide in the past without any difficulty  The following portions of the patient's history were reviewed and updated as appropriate: allergies, current medications, past family history, past medical history, past social history, past surgical history and problem list     Review of Systems   Constitutional: Negative  HENT: Negative  Eyes: Negative  Respiratory: Negative  Cardiovascular: Negative  Gastrointestinal: Negative  Endocrine: Negative  Genitourinary: Negative  Musculoskeletal: Negative  Skin: Negative  Allergic/Immunologic: Negative  Neurological: Negative  Hematological: Negative  Psychiatric/Behavioral: Negative  Objective:      /70 (BP Location: Right arm, Patient Position: Sitting, Cuff Size: Large)   Pulse 64   Ht 5' 4" (1 626 m)   Wt 78 1 kg (172 lb 3 2 oz)   LMP  (LMP Unknown)   SpO2 98%   BMI 29 56 kg/m²          Physical Exam   Constitutional: She appears well-developed  Cardiovascular: Normal rate and regular rhythm  Pulmonary/Chest: Effort normal and breath sounds normal    Nursing note and vitals reviewed

## 2018-08-20 DIAGNOSIS — I10 ESSENTIAL HYPERTENSION: ICD-10-CM

## 2018-08-21 RX ORDER — HYDROCHLOROTHIAZIDE 12.5 MG/1
12.5 TABLET ORAL DAILY
Qty: 90 TABLET | Refills: 1 | Status: SHIPPED | OUTPATIENT
Start: 2018-08-21 | End: 2019-01-14 | Stop reason: SDUPTHER

## 2018-09-19 DIAGNOSIS — I10 ESSENTIAL HYPERTENSION: Primary | ICD-10-CM

## 2018-09-19 RX ORDER — METOPROLOL SUCCINATE 50 MG/1
100 TABLET, EXTENDED RELEASE ORAL DAILY
Qty: 90 TABLET | Refills: 0 | Status: SHIPPED | OUTPATIENT
Start: 2018-09-19 | End: 2018-11-05 | Stop reason: SDUPTHER

## 2018-10-18 ENCOUNTER — HOSPITAL ENCOUNTER (OUTPATIENT)
Facility: HOSPITAL | Age: 58
Setting detail: OBSERVATION
Discharge: HOME | End: 2018-10-20
Attending: EMERGENCY MEDICINE | Admitting: INTERNAL MEDICINE
Payer: COMMERCIAL

## 2018-10-18 DIAGNOSIS — R20.0 NUMBNESS AND TINGLING OF LEFT SIDE OF FACE: Primary | ICD-10-CM

## 2018-10-18 DIAGNOSIS — R20.2 NUMBNESS AND TINGLING OF LEFT SIDE OF FACE: Primary | ICD-10-CM

## 2018-10-18 DIAGNOSIS — R29.90 STROKE-LIKE SYMPTOMS: ICD-10-CM

## 2018-10-18 DIAGNOSIS — R07.9 CHEST PAIN: ICD-10-CM

## 2018-10-18 DIAGNOSIS — G43.909 MIGRAINE WITHOUT STATUS MIGRAINOSUS, NOT INTRACTABLE, UNSPECIFIED MIGRAINE TYPE: ICD-10-CM

## 2018-10-18 DIAGNOSIS — I10 BENIGN ESSENTIAL HYPERTENSION: ICD-10-CM

## 2018-10-18 DIAGNOSIS — G45.3 BILATERAL AMAUROSIS FUGAX: ICD-10-CM

## 2018-10-18 PROCEDURE — 99285 EMERGENCY DEPT VISIT HI MDM: CPT

## 2018-10-18 PROCEDURE — 93005 ELECTROCARDIOGRAM TRACING: CPT

## 2018-10-18 PROCEDURE — 93010 ELECTROCARDIOGRAM REPORT: CPT

## 2018-10-19 ENCOUNTER — APPOINTMENT (OUTPATIENT)
Dept: RADIOLOGY | Facility: HOSPITAL | Age: 58
End: 2018-10-19
Payer: COMMERCIAL

## 2018-10-19 ENCOUNTER — APPOINTMENT (EMERGENCY)
Dept: RADIOLOGY | Facility: HOSPITAL | Age: 58
End: 2018-10-19
Payer: COMMERCIAL

## 2018-10-19 ENCOUNTER — APPOINTMENT (OUTPATIENT)
Dept: NON INVASIVE DIAGNOSTICS | Facility: HOSPITAL | Age: 58
End: 2018-10-19
Payer: COMMERCIAL

## 2018-10-19 PROBLEM — R29.90 STROKE-LIKE SYMPTOMS: Status: ACTIVE | Noted: 2018-10-19

## 2018-10-19 PROBLEM — R07.9 CHEST PAIN: Status: ACTIVE | Noted: 2018-10-19

## 2018-10-19 PROBLEM — F41.9 ANXIETY: Status: ACTIVE | Noted: 2018-10-19

## 2018-10-19 LAB
ALBUMIN SERPL BCP-MCNC: 4.3 G/DL (ref 3.5–5)
ALP SERPL-CCNC: 103 U/L (ref 46–116)
ALT SERPL W P-5'-P-CCNC: 22 U/L (ref 12–78)
ANION GAP SERPL CALCULATED.3IONS-SCNC: 5 MMOL/L (ref 4–13)
ANION GAP SERPL CALCULATED.3IONS-SCNC: 6 MMOL/L (ref 4–13)
AST SERPL W P-5'-P-CCNC: 25 U/L (ref 5–45)
ATRIAL RATE: 64 BPM
ATRIAL RATE: 67 BPM
BASOPHILS # BLD AUTO: 0.05 THOUSANDS/ΜL (ref 0–0.1)
BASOPHILS NFR BLD AUTO: 1 % (ref 0–1)
BILIRUB SERPL-MCNC: 0.28 MG/DL (ref 0.2–1)
BUN SERPL-MCNC: 19 MG/DL (ref 5–25)
BUN SERPL-MCNC: 25 MG/DL (ref 5–25)
CALCIUM SERPL-MCNC: 8.7 MG/DL (ref 8.3–10.1)
CALCIUM SERPL-MCNC: 9.3 MG/DL (ref 8.3–10.1)
CHLORIDE SERPL-SCNC: 100 MMOL/L (ref 100–108)
CHLORIDE SERPL-SCNC: 106 MMOL/L (ref 100–108)
CHOLEST SERPL-MCNC: 283 MG/DL (ref 50–200)
CO2 SERPL-SCNC: 24 MMOL/L (ref 21–32)
CO2 SERPL-SCNC: 27 MMOL/L (ref 21–32)
CREAT SERPL-MCNC: 0.76 MG/DL (ref 0.6–1.3)
CREAT SERPL-MCNC: 0.92 MG/DL (ref 0.6–1.3)
CRP SERPL QL: 7.2 MG/L
EOSINOPHIL # BLD AUTO: 0.29 THOUSAND/ΜL (ref 0–0.61)
EOSINOPHIL NFR BLD AUTO: 6 % (ref 0–6)
ERYTHROCYTE [DISTWIDTH] IN BLOOD BY AUTOMATED COUNT: 12.5 % (ref 11.6–15.1)
ERYTHROCYTE [SEDIMENTATION RATE] IN BLOOD: 10 MM/HOUR (ref 0–20)
EST. AVERAGE GLUCOSE BLD GHB EST-MCNC: 108 MG/DL
GFR SERPL CREATININE-BSD FRML MDRD: 68 ML/MIN/1.73SQ M
GFR SERPL CREATININE-BSD FRML MDRD: 86 ML/MIN/1.73SQ M
GLUCOSE SERPL-MCNC: 81 MG/DL (ref 65–140)
GLUCOSE SERPL-MCNC: 89 MG/DL (ref 65–140)
HBA1C MFR BLD: 5.4 % (ref 4.2–6.3)
HCT VFR BLD AUTO: 33.2 % (ref 34.8–46.1)
HDLC SERPL-MCNC: 59 MG/DL (ref 40–60)
HGB BLD-MCNC: 11.1 G/DL (ref 11.5–15.4)
IMM GRANULOCYTES # BLD AUTO: 0.02 THOUSAND/UL (ref 0–0.2)
IMM GRANULOCYTES NFR BLD AUTO: 0 % (ref 0–2)
LDLC SERPL CALC-MCNC: 193 MG/DL (ref 0–100)
LYMPHOCYTES # BLD AUTO: 2.14 THOUSANDS/ΜL (ref 0.6–4.47)
LYMPHOCYTES NFR BLD AUTO: 45 % (ref 14–44)
MAGNESIUM SERPL-MCNC: 2.6 MG/DL (ref 1.6–2.6)
MCH RBC QN AUTO: 31.5 PG (ref 26.8–34.3)
MCHC RBC AUTO-ENTMCNC: 33.4 G/DL (ref 31.4–37.4)
MCV RBC AUTO: 94 FL (ref 82–98)
MONOCYTES # BLD AUTO: 0.34 THOUSAND/ΜL (ref 0.17–1.22)
MONOCYTES NFR BLD AUTO: 7 % (ref 4–12)
NEUTROPHILS # BLD AUTO: 1.97 THOUSANDS/ΜL (ref 1.85–7.62)
NEUTS SEG NFR BLD AUTO: 41 % (ref 43–75)
NRBC BLD AUTO-RTO: 0 /100 WBCS
P AXIS: 10 DEGREES
P AXIS: 24 DEGREES
PLATELET # BLD AUTO: 212 THOUSANDS/UL (ref 149–390)
PMV BLD AUTO: 9.3 FL (ref 8.9–12.7)
POTASSIUM SERPL-SCNC: 3.8 MMOL/L (ref 3.5–5.3)
POTASSIUM SERPL-SCNC: 4.4 MMOL/L (ref 3.5–5.3)
PR INTERVAL: 136 MS
PR INTERVAL: 140 MS
PROT SERPL-MCNC: 8 G/DL (ref 6.4–8.2)
QRS AXIS: -23 DEGREES
QRS AXIS: -24 DEGREES
QRSD INTERVAL: 80 MS
QRSD INTERVAL: 86 MS
QT INTERVAL: 394 MS
QT INTERVAL: 400 MS
QTC INTERVAL: 412 MS
QTC INTERVAL: 416 MS
RBC # BLD AUTO: 3.52 MILLION/UL (ref 3.81–5.12)
SODIUM SERPL-SCNC: 132 MMOL/L (ref 136–145)
SODIUM SERPL-SCNC: 136 MMOL/L (ref 136–145)
T WAVE AXIS: 40 DEGREES
T WAVE AXIS: 43 DEGREES
TRIGL SERPL-MCNC: 153 MG/DL
TROPONIN I SERPL-MCNC: <0.02 NG/ML
TSH SERPL DL<=0.05 MIU/L-ACNC: 1.63 UIU/ML (ref 0.36–3.74)
VENTRICULAR RATE: 64 BPM
VENTRICULAR RATE: 67 BPM
WBC # BLD AUTO: 4.81 THOUSAND/UL (ref 4.31–10.16)

## 2018-10-19 PROCEDURE — 36415 COLL VENOUS BLD VENIPUNCTURE: CPT | Performed by: EMERGENCY MEDICINE

## 2018-10-19 PROCEDURE — 80061 LIPID PANEL: CPT | Performed by: PHYSICIAN ASSISTANT

## 2018-10-19 PROCEDURE — 70496 CT ANGIOGRAPHY HEAD: CPT

## 2018-10-19 PROCEDURE — 86140 C-REACTIVE PROTEIN: CPT | Performed by: NURSE PRACTITIONER

## 2018-10-19 PROCEDURE — 71046 X-RAY EXAM CHEST 2 VIEWS: CPT

## 2018-10-19 PROCEDURE — 99220 PR INITIAL OBSERVATION CARE/DAY 70 MINUTES: CPT | Performed by: INTERNAL MEDICINE

## 2018-10-19 PROCEDURE — 93010 ELECTROCARDIOGRAM REPORT: CPT | Performed by: INTERNAL MEDICINE

## 2018-10-19 PROCEDURE — 99244 OFF/OP CNSLTJ NEW/EST MOD 40: CPT | Performed by: PSYCHIATRY & NEUROLOGY

## 2018-10-19 PROCEDURE — 80048 BASIC METABOLIC PNL TOTAL CA: CPT | Performed by: PHYSICIAN ASSISTANT

## 2018-10-19 PROCEDURE — 70498 CT ANGIOGRAPHY NECK: CPT

## 2018-10-19 PROCEDURE — 93306 TTE W/DOPPLER COMPLETE: CPT

## 2018-10-19 PROCEDURE — 93306 TTE W/DOPPLER COMPLETE: CPT | Performed by: INTERNAL MEDICINE

## 2018-10-19 PROCEDURE — G8979 MOBILITY GOAL STATUS: HCPCS

## 2018-10-19 PROCEDURE — 97166 OT EVAL MOD COMPLEX 45 MIN: CPT

## 2018-10-19 PROCEDURE — 80053 COMPREHEN METABOLIC PANEL: CPT | Performed by: EMERGENCY MEDICINE

## 2018-10-19 PROCEDURE — 83036 HEMOGLOBIN GLYCOSYLATED A1C: CPT | Performed by: PHYSICIAN ASSISTANT

## 2018-10-19 PROCEDURE — 83735 ASSAY OF MAGNESIUM: CPT | Performed by: PHYSICIAN ASSISTANT

## 2018-10-19 PROCEDURE — 97163 PT EVAL HIGH COMPLEX 45 MIN: CPT

## 2018-10-19 PROCEDURE — 70450 CT HEAD/BRAIN W/O DYE: CPT

## 2018-10-19 PROCEDURE — 84484 ASSAY OF TROPONIN QUANT: CPT | Performed by: EMERGENCY MEDICINE

## 2018-10-19 PROCEDURE — 84484 ASSAY OF TROPONIN QUANT: CPT | Performed by: PHYSICIAN ASSISTANT

## 2018-10-19 PROCEDURE — 84443 ASSAY THYROID STIM HORMONE: CPT | Performed by: NURSE PRACTITIONER

## 2018-10-19 PROCEDURE — G8978 MOBILITY CURRENT STATUS: HCPCS

## 2018-10-19 PROCEDURE — 85652 RBC SED RATE AUTOMATED: CPT | Performed by: INTERNAL MEDICINE

## 2018-10-19 PROCEDURE — 85025 COMPLETE CBC W/AUTO DIFF WBC: CPT | Performed by: PHYSICIAN ASSISTANT

## 2018-10-19 RX ORDER — MAGNESIUM SULFATE HEPTAHYDRATE 40 MG/ML
2 INJECTION, SOLUTION INTRAVENOUS
Status: DISCONTINUED | OUTPATIENT
Start: 2018-10-19 | End: 2018-10-20 | Stop reason: HOSPADM

## 2018-10-19 RX ORDER — KETOROLAC TROMETHAMINE 30 MG/ML
30 INJECTION, SOLUTION INTRAMUSCULAR; INTRAVENOUS EVERY 6 HOURS PRN
Status: DISCONTINUED | OUTPATIENT
Start: 2018-10-19 | End: 2018-10-20

## 2018-10-19 RX ORDER — BUTALBITAL, ACETAMINOPHEN AND CAFFEINE 50; 325; 40 MG/1; MG/1; MG/1
1 TABLET ORAL EVERY 6 HOURS PRN
Status: DISCONTINUED | OUTPATIENT
Start: 2018-10-19 | End: 2018-10-20 | Stop reason: HOSPADM

## 2018-10-19 RX ORDER — ACETAMINOPHEN 325 MG/1
650 TABLET ORAL EVERY 6 HOURS PRN
Status: DISCONTINUED | OUTPATIENT
Start: 2018-10-19 | End: 2018-10-20 | Stop reason: HOSPADM

## 2018-10-19 RX ORDER — FLUTICASONE PROPIONATE 50 MCG
1 SPRAY, SUSPENSION (ML) NASAL 2 TIMES DAILY
Status: DISCONTINUED | OUTPATIENT
Start: 2018-10-19 | End: 2018-10-20 | Stop reason: HOSPADM

## 2018-10-19 RX ORDER — KETOROLAC TROMETHAMINE 30 MG/ML
15 INJECTION, SOLUTION INTRAMUSCULAR; INTRAVENOUS ONCE
Status: COMPLETED | OUTPATIENT
Start: 2018-10-19 | End: 2018-10-19

## 2018-10-19 RX ORDER — METOCLOPRAMIDE HYDROCHLORIDE 5 MG/ML
10 INJECTION INTRAMUSCULAR; INTRAVENOUS EVERY 6 HOURS SCHEDULED
Status: DISCONTINUED | OUTPATIENT
Start: 2018-10-19 | End: 2018-10-20 | Stop reason: HOSPADM

## 2018-10-19 RX ORDER — CHLORAL HYDRATE 500 MG
1000 CAPSULE ORAL DAILY
Status: DISCONTINUED | OUTPATIENT
Start: 2018-10-19 | End: 2018-10-20 | Stop reason: HOSPADM

## 2018-10-19 RX ORDER — HYDROCHLOROTHIAZIDE 12.5 MG/1
12.5 TABLET ORAL DAILY
Status: DISCONTINUED | OUTPATIENT
Start: 2018-10-19 | End: 2018-10-20 | Stop reason: HOSPADM

## 2018-10-19 RX ORDER — LOSARTAN POTASSIUM 50 MG/1
100 TABLET ORAL DAILY
Status: DISCONTINUED | OUTPATIENT
Start: 2018-10-19 | End: 2018-10-20 | Stop reason: HOSPADM

## 2018-10-19 RX ORDER — ASPIRIN 81 MG/1
81 TABLET, CHEWABLE ORAL DAILY
Status: DISCONTINUED | OUTPATIENT
Start: 2018-10-19 | End: 2018-10-20 | Stop reason: HOSPADM

## 2018-10-19 RX ORDER — PANTOPRAZOLE SODIUM 40 MG/1
40 TABLET, DELAYED RELEASE ORAL
Status: DISCONTINUED | OUTPATIENT
Start: 2018-10-19 | End: 2018-10-20 | Stop reason: HOSPADM

## 2018-10-19 RX ORDER — CELECOXIB 100 MG/1
100 CAPSULE ORAL DAILY
Status: DISCONTINUED | OUTPATIENT
Start: 2018-10-19 | End: 2018-10-20 | Stop reason: HOSPADM

## 2018-10-19 RX ORDER — FLUTICASONE FUROATE AND VILANTEROL 200; 25 UG/1; UG/1
1 POWDER RESPIRATORY (INHALATION) DAILY
Status: DISCONTINUED | OUTPATIENT
Start: 2018-10-19 | End: 2018-10-20 | Stop reason: HOSPADM

## 2018-10-19 RX ORDER — LORAZEPAM 0.5 MG/1
0.5 TABLET ORAL EVERY 6 HOURS PRN
Status: DISCONTINUED | OUTPATIENT
Start: 2018-10-19 | End: 2018-10-20 | Stop reason: HOSPADM

## 2018-10-19 RX ORDER — METOPROLOL SUCCINATE 100 MG/1
100 TABLET, EXTENDED RELEASE ORAL DAILY
Status: DISCONTINUED | OUTPATIENT
Start: 2018-10-19 | End: 2018-10-20 | Stop reason: HOSPADM

## 2018-10-19 RX ADMIN — VALPROATE SODIUM 500 MG: 100 INJECTION, SOLUTION INTRAVENOUS at 16:57

## 2018-10-19 RX ADMIN — METOCLOPRAMIDE 10 MG: 5 INJECTION, SOLUTION INTRAMUSCULAR; INTRAVENOUS at 23:29

## 2018-10-19 RX ADMIN — FLUTICASONE PROPIONATE 1 SPRAY: 50 SPRAY, METERED NASAL at 08:43

## 2018-10-19 RX ADMIN — LORAZEPAM 0.5 MG: 0.5 TABLET ORAL at 16:57

## 2018-10-19 RX ADMIN — HYDROCHLOROTHIAZIDE 12.5 MG: 12.5 TABLET ORAL at 08:42

## 2018-10-19 RX ADMIN — LORAZEPAM 0.5 MG: 0.5 TABLET ORAL at 08:42

## 2018-10-19 RX ADMIN — MAGNESIUM SULFATE HEPTAHYDRATE 2 G: 40 INJECTION, SOLUTION INTRAVENOUS at 17:13

## 2018-10-19 RX ADMIN — ASPIRIN 81 MG 81 MG: 81 TABLET ORAL at 08:42

## 2018-10-19 RX ADMIN — PANTOPRAZOLE SODIUM 40 MG: 40 TABLET, DELAYED RELEASE ORAL at 06:37

## 2018-10-19 RX ADMIN — KETOROLAC TROMETHAMINE 15 MG: 30 INJECTION, SOLUTION INTRAMUSCULAR at 14:45

## 2018-10-19 RX ADMIN — CELECOXIB 100 MG: 100 CAPSULE ORAL at 08:43

## 2018-10-19 RX ADMIN — Medication 1000 MG: at 08:42

## 2018-10-19 RX ADMIN — LOSARTAN POTASSIUM 100 MG: 50 TABLET ORAL at 08:42

## 2018-10-19 RX ADMIN — VALPROATE SODIUM 500 MG: 100 INJECTION, SOLUTION INTRAVENOUS at 23:29

## 2018-10-19 RX ADMIN — IOHEXOL 85 ML: 350 INJECTION, SOLUTION INTRAVENOUS at 15:39

## 2018-10-19 RX ADMIN — FLUTICASONE FUROATE AND VILANTEROL TRIFENATATE 1 PUFF: 200; 25 POWDER RESPIRATORY (INHALATION) at 08:43

## 2018-10-19 RX ADMIN — FLUTICASONE PROPIONATE 1 SPRAY: 50 SPRAY, METERED NASAL at 20:39

## 2018-10-19 RX ADMIN — METOCLOPRAMIDE 10 MG: 5 INJECTION, SOLUTION INTRAMUSCULAR; INTRAVENOUS at 17:22

## 2018-10-19 RX ADMIN — METOCLOPRAMIDE 10 MG: 5 INJECTION, SOLUTION INTRAMUSCULAR; INTRAVENOUS at 14:45

## 2018-10-19 NOTE — ASSESSMENT & PLAN NOTE
- Reports intermittent "black spots" in vision   - Had workup with carotid doppler and echo in 2016 that was (-), as well as opthalmology consult

## 2018-10-19 NOTE — H&P
H&P- Jonathan Baig 1960, 59 y o  female MRN: [id number]    Unit/Bed#: CW2 214-01 Encounter: [id number]    Primary Care Provider: Kendrick Murrell MD   Date and time admitted to hospital: 10/18/2018 11:15 PM        * Stroke-like symptoms   Assessment & Plan    - Lt sided facial numbness with LUE numbness and tingling x 3 days  Reports loss of vision to Lt eye x 5-10 minutes 10/18/2018  States was having slurred speech and balance issues as well  - No focal deficits noted at this time  - Symptoms may be atypical migraine vs TIA vs HTN urgency vs anxiety issues  - Initial /92, now 120/47 without intervention  - CT head reveals no acute intracranial abnormality  - Carotid doppler 11/2016 revealed <50% stenosis bilaterally  - Echocardiogram 11/2016 revealed LVEF 65%  Mild mitral valve regurg, Mild/mod aortic valve regurg with no stenosis, trace tricuspid regurg   - Consult neurology     Benign essential hypertension   Assessment & Plan    - BP initially 193/92, now 120/47 without intervention in ED  - Continue home dose metoprolol 100 mg qd, HCTZ 12 5 mg qd  - Monitor BP per unit protocol     Migraines   Assessment & Plan    - Reports history of migraines with related visual changes  - Continue home dose PRN Fioricet  - Consult neurology as above     Chest pain   Assessment & Plan    - Reports Lt sided CHP that radiates into back x 1 week with associated palpitations and intermittent "night sweats"  - Initial troponin (-)  Will trend  - 12 lead EKG reveals SR with no ischemic changes     Anxiety   Assessment & Plan    - Continue ativan 0 5 mg qid prn     Hyperlipidemia   Assessment & Plan    - States "throat swells up" with multiple statins  Has been prescribed Repatha, but unable to afford prescription  States currently taking red yeast rice and occasionally takes fish oil when she "can afford it "  - Last FLP 11/2017   Will recheck in am  - Start Fish oil 1g daily     Bilateral amaurosis fugax Assessment & Plan    - Reports intermittent "black spots" in vision   - Had workup with carotid doppler and echo in 2016 that was (-), as well as opthalmology consult  VTE Prophylaxis: Enoxaparin (Lovenox)  / sequential compression device   Code Status: Level 1 Full Code  POLST: There is no POLST form on file for this patient (pre-hospital)  Discussion with family: NA    Anticipated Length of Stay:  Patient will be admitted on an Observation basis with an anticipated length of stay of  Less than 2 midnights  Justification for Hospital Stay: See AP above    Total Time for Visit, including Counseling / Coordination of Care: 30 minutes  Greater than 50% of this total time spent on direct patient counseling and coordination of care  Chief Complaint:   Lt sided facial numbness with LUE numbness and tingling  Brief loss of vision Lt eye that is now resolved  History of Present Illness:    Shamika Viveros is a 59 y o  female with PMH of HTN, HLD, anxiety, GERD, migraines and venous insufficiency who presents with Lt sided facial numbness with LUE numbness and tingling x 3 days  Also reports brief loss of vision in Lt eye that lasted 5-10 minutes yesterday as well as slurred speech and balance issues x 1 day  States has had Lt sided CHP that radiates into back x 1 week  Recently diagnosed with URI on 10/16, and was prescribed medrol dose pack  Reports symptoms now resolved  Denies N/V/D  (+) reflux  (+) night sweats  (+) palpitations  Denies fever  Denies SOB  Denies light-headedness or syncopal episodes  Review of Systems:    Review of Systems   Constitutional: Positive for diaphoresis  Negative for activity change, appetite change and fever  HENT: Positive for congestion  Negative for ear pain and trouble swallowing  Eyes: Positive for visual disturbance  Negative for photophobia and pain  Respiratory: Negative for cough, shortness of breath and wheezing      Cardiovascular: Positive for chest pain and palpitations  Negative for leg swelling  Gastrointestinal: Negative for abdominal pain, constipation, diarrhea, nausea and vomiting  Genitourinary: Negative for difficulty urinating, dysuria and urgency  Musculoskeletal: Positive for gait problem  Negative for neck pain and neck stiffness  Neurological: Positive for speech difficulty  Negative for dizziness, syncope, facial asymmetry, weakness and light-headedness  Past Medical and Surgical History:     Past Medical History:   Diagnosis Date    Hyperlipidemia     Hypertension     Migraine     Psychiatric disorder        Past Surgical History:   Procedure Laterality Date    EXPLORATORY LAPAROTOMY      HAND SURGERY      Hand excision of tendon cyst       Meds/Allergies:    Prior to Admission medications    Medication Sig Start Date End Date Taking? Authorizing Provider   butalbital-acetaminophen-caffeine (FIORICET,ESGIC) -40 mg per tablet Take 1 tablet by mouth   Yes Historical Provider, MD   celecoxib (CeleBREX) 100 mg capsule Take 100 mg by mouth daily   Yes Historical Provider, MD   fluticasone (FLONASE) 50 mcg/act nasal spray 1 spray into each nostril 2 (two) times a day 7/14/17  Yes Historical Provider, MD   fluticasone-vilanterol (BREO ELLIPTA) 200-25 MCG/INH inhaler Inhale 1 puff daily Rinse mouth after use     Yes Historical Provider, MD   hydrochlorothiazide (HYDRODIURIL) 12 5 mg tablet Take 1 tablet (12 5 mg total) by mouth daily 8/21/18  Yes Chito Christina MD   ibuprofen (MOTRIN) 800 mg tablet Take by mouth 2 (two) times a day   Yes Historical Provider, MD   LORazepam (ATIVAN) 0 5 mg tablet Take 0 5 mg by mouth every 6 (six) hours as needed     Yes Historical Provider, MD   metoprolol succinate (TOPROL-XL) 50 mg 24 hr tablet Take 2 tablets (100 mg total) by mouth daily 9/19/18  Yes Chito Christina MD   MULTIPLE VITAMIN PO Take by mouth   Yes Historical Provider, MD   pantoprazole (PROTONIX) 40 mg tablet Take 1 tablet (40 mg total) by mouth daily 7/23/18  Yes [place],    valsartan (DIOVAN) 160 mg tablet Take 1 tablet (160 mg total) by mouth daily 8/10/18  Yes Tiffanie Huber MD     I have reviewed home medications with patient personally  Allergies: Allergies   Allergen Reactions    Ace Inhibitors Anaphylaxis     Anaphylaxis    Atorvastatin Other (See Comments)     "throat closing up"    Barium Sulfate Other (See Comments)     hives throat closing    Other Other (See Comments)     Other reaction(s): angioadema  Other reaction(s): Other (See Comments)  Preservatives- itching throat closes, hi  Other reaction(s): angioadema  E Z Cat - hives throat closes itching,  Preservatives- itching throat closes, hi    Sulfa Antibiotics Other (See Comments)     stuffiness,itching,hives,throat closing       Social History:     Marital Status:    Patient Pre-hospital Living Situation: Lives at home alone  Patient Pre-hospital Level of Mobility: Ambulates without assistive device  Patient Pre-hospital Diet Restrictions: None  Substance Use History:   History   Alcohol Use No     History   Smoking Status    Current Every Day Smoker    Packs/day: 0 50    Years: 10 00   Smokeless Tobacco    Never Used     History   Drug Use No       Family History:    Family History   Problem Relation Age of Onset    Lung cancer Mother     Diabetes Father     Diabetes Other        Physical Exam:     Vitals:   Blood Pressure: 132/63 (10/19/18 0300)  Pulse: 62 (10/19/18 0300)  Temperature: 97 9 °F (36 6 °C) (10/19/18 0300)  Temp Source: Oral (10/19/18 0300)  Respirations: 18 (10/19/18 0300)  Height: 5' 4" (162 6 cm) (10/19/18 0300)  Weight - Scale: 79 3 kg (174 lb 12 8 oz) (10/19/18 0300)  SpO2: 96 % (10/19/18 0300)    Physical Exam   Constitutional: She is oriented to person, place, and time  She appears well-developed and well-nourished  No distress  HENT:   Head: Normocephalic and atraumatic     Eyes: Pupils are equal, round, and reactive to light  Conjunctivae and EOM are normal    Neck: Normal range of motion  Neck supple  Cardiovascular: Normal rate, regular rhythm, normal heart sounds and intact distal pulses  Exam reveals no gallop and no friction rub  No murmur heard  Pulmonary/Chest: Effort normal and breath sounds normal  No respiratory distress  She has no wheezes  Abdominal: Soft  Bowel sounds are normal  There is no tenderness  There is no rebound and no guarding  Musculoskeletal: Normal range of motion  She exhibits no edema  Neurological: She is alert and oriented to person, place, and time  No cranial nerve deficit  Skin: Skin is warm and dry  Psychiatric: She has a normal mood and affect  Her behavior is normal          Additional Data:     Lab Results: I have personally reviewed pertinent reports  Results from last 7 days  Lab Units 10/19/18  0000   SODIUM mmol/L 132*   POTASSIUM mmol/L 4 4   CHLORIDE mmol/L 100   CO2 mmol/L 27   BUN mg/dL 25   CREATININE mg/dL 0 92   ANION GAP mmol/L 5   CALCIUM mg/dL 9 3   ALBUMIN g/dL 4 3   TOTAL BILIRUBIN mg/dL 0 28   ALK PHOS U/L 103   ALT U/L 22   AST U/L 25                       Imaging: I have personally reviewed pertinent reports  XR chest 2 views   ED Interpretation by Shanique Del Cid DO (10/19 0124)   No acute findings  CT head without contrast   Final Result by Simeon Hart MD (10/19 0105)      No acute intracranial abnormality  Workstation performed: [id number]             EKG, Pathology, and Other Studies Reviewed on Admission:   · EKG: SR with LVH  No ischemic changes  Allscripts / Epic Records Reviewed: Yes     ** Please Note: This note has been constructed using a voice recognition system   **

## 2018-10-19 NOTE — ASSESSMENT & PLAN NOTE
- BP initially 193/92, now 120/47 without intervention in ED  - Continue home dose metoprolol 100 mg qd, HCTZ 12 5 mg qd  - Monitor BP per unit protocol

## 2018-10-19 NOTE — PLAN OF CARE
Problem: PHYSICAL THERAPY ADULT  Goal: Performs mobility at highest level of function for planned discharge setting  See evaluation for individualized goals  Treatment/Interventions: Patient/family training, Gait training, Spoke to nursing, Spoke to case management (high level balance and coordination activities  )  Equipment Recommended: Other (Comment) (none )       See flowsheet documentation for full assessment, interventions and recommendations  Prognosis: Good  Problem List: Impaired balance, Decreased coordination, Decreased mobility, Decreased cognition, Pain  Assessment: Pt is 59 y o  female seen for PT evaluation s/p admit to [facility] on 10/18/2018 w/ Stroke-like symptoms  Pt has a recent hx of L facial and arm numbness/tingling, slurred speech, and vision loss  At this time pt reports continued but lessened facial and arm paresthesias and some continued speech difficulties  PT consulted to assess pt's functional mobility and d/c needs  Order placed for PT eval and tx, w/ up as tolerated order  Comorbidities affecting pt's physical performance at time of assessment include:pt with hx of HTN, diverticulitis of colon, attention disturbance, chronic LBP, female pelvic pain, obesity, tick bite, venous insufficiency, anxiety, psychiatric disorder and hand surgery  PTA, pt was independent w/ all functional mobility w/ no need for an assistive device for ambulation , ambulates community distances and elevations, has 1 XIOMARA, lives alone in 1 level apartment, works full time and stated recently she has 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 for balance and has been forgetful    Personal factors affecting pt at time of IE include: stairs to enter home, limited home support, anxiety, inability to perform current job functions, inability to perform IADLs and states she continues to have some slurring of her words and some mental slowness   Please find objective findings from PT assessment regarding body systems outlined above with impairments and limitations including impaired balance, impaired coordination, pain and with possible cognative issues  Pt would benefit from a cognative assessment  ( Note pt lives alone, worked full time, and drove her own car independently prior to admission ) The following objective measures performed on IE also reveal limitations: Barthel Index: 95/100  Pt's clinical presentation is currently unstable/unpredictable seen in pt's presentation of Pt continues to have stroke-like symptoms , pt with 4/10 headache, pt with pending CTA of head/neck, pt with the need for neuro checks and telemetry, pt with hx of anxiety, pt lives alone, pt with below baseline level of mobility  Pt to benefit from continued PT tx to address deficits as defined above and maximize level of functional independent mobility and consistency  From PT/mobility standpoint, recommendation at time of d/c would be home alone with outpt PT rx's pending ok outcome on cognative evaluation and pending physicians approval for pt to drive independently/live independently pending progress in order to facilitate   Barriers to Discharge: Decreased caregiver support     Recommendation: Other (Comment) (If cog eval ok then home with outpt PT rx's  )          See flowsheet documentation for full assessment

## 2018-10-19 NOTE — PROGRESS NOTES
Post admission check by internal medicine  Patient reports that her symptoms have mostly resolved  Reports some mild visual disturbance in the left eye and supple slurring of speech  Currently awaiting CT angiogram of head and neck and MRI  Counseled patient that if the above studies are negative she will need to follow up with Ophthalmology  In regards to chest pain which has resolved, she states that this typically occurs in the morning upon awakening  She does report eating late prior to bed at approximately 11:00 p m  Does suffer from gastric reflux and is unable to afford Dexilant has been relying on Zantac  Suspect gastritis and possible reflux esophagitis  Assessment:  Possible TIA, possible gastritis and reflux esophagitis  Plan:  Await CTA of head and neck, MRI, continue aspirin, continue PPI

## 2018-10-19 NOTE — CONSULTS
Consultation - Neurology   Dimas Diamond 59 y o  female MRN: [id number]  Unit/Bed#: CW2 214-01 Encounter: [id number]      Physician Requesting Consult: Carlito Bedoya MD  Reason for Consult / Principal Problem: numbness/tinling Left face, bilateral amaurosis fugax  Hx and PE limited by:none  Review of previous medical records  completed  Family, not  present at the bedside for history and examination    Assessment/Plan:  3  60 yo female with pmh migraine, ocular migraine, HTN, and HLD presents with  left face and LUE paresthesia with intermittent visual disturbance and headache x 3 days  Yesterday, patient states coworkers noticed left facial droop and slurred speech, and patient reporting difficulties ambulating, mild chest pain and palpitations  On exam, patient with blurry vision in left eye, diplopia (vertical) bilateral,  decreased temperature sense and light touch in left cheek and LUE, and decreased pinprick in LLE  Etiology unclear  Differential dx: complex migraine vs hypertensive urgency vs concern for stroke in the setting of patient with multiple vascular risks factors including HLD and uncontrolled HTN  -CT Head wo contrast with no acute intracranial abnormality   -continue stroke pathway  -CTA head and neck pending  -MRI Brain wo contrast pending  -2D echo pending  -telemetry monitoring for arrhythmia  -PT/OT evaluate and treat  -secondary stroke prevention  -continue ASA  -will discuss statin medication with Attending Neurologist; since patient with allergy to lipitor  -permissive hypertension 140-160 SBP  -normothermia and euglycemia  -monitor neuro checks and notify with changes    2  Intractable headache: Patient with recent upper respiratory infection treated with Medrol dose pack for 6 days  Headache is described as non-radiating behind left eye, with left eye blurry vision "4/10"  "pressure with eye puffiness", with accompanied photophobia    -initiate migraine regimen:    -magnesium sulfate IV 2 g daily   -Depacon  mg Q8 x3 doses   -Reglan 10 mg Q6,    -Toradol 15 mg Q6  -avoid opiates; may cause rebound headache  -requested follow up with [facility] for 4-6 weeks    3  HTN: BP on admission 193/92  -currently on valsartan, metoprolol and hydrochlorothiazide   -management per Internal Medicine team        HPI: Quinn Delatorre is a  59 y o  female with pmh of migraine, HTN, HLD,and diverticulitis, who presents to ED on 10/18/18 with left sided facial numbness radiating into LUE numbness with intermittent visual problems starting 3 days prior to admission  Per ED report, she also noted that yesterday she had a transient episode of left eye vision blacking out for several minutes, some speech difficulties, and feeling off balance  In ED /92, HR 74, SpO2 98% on room air  Patient states she gas been having numbness/tingling down her left arm and decreased sensation in the left side of her face over the past 3 days  Yesterday she went to work and her co-workers noticed her speech was slurred  She also noted she felt off balance with ambulation  She had mild chest pain and palpitations at that time  She also noted she had difficulty with ambulation due to feeling off balance  Today she continues with a non-radiating headache behind left eye described as pressure "4/10" with associated left eye blurry vision, diplopia, photophobia, left facial and LUE numbness  Of note, she had a transient episode of left eye vision "blacking out" last week  Episode lasted approximately 5-10 minutes and then returned  Patient also had an upper respiratory infection in which she was started on a Medrol dose pack for 6 days on 10/9/18  She describes symptoms then as sore throat, headache, and nasal congestion  These symptoms have since resolved       ROS: 12 system cued query: conducted and negative except as noted in HPI          Stroke Assessment     Row Name 10/19/18 0278 NIH Stroke Scale    Interval Baseline      Level of Consciousness (1a ) 0      LOC Questions (1b ) 0      LOC Commands (1c ) 0      Best Gaze (2 ) 0      Visual (3 ) 0      Facial Palsy (4 ) 0      Motor Arm, Left (5a ) 0      Motor Arm, Right (5b ) 0      Motor Leg, Left (6a ) 0      Motor Leg, Right (6b ) 0      Limb Ataxia (7 ) 0      Sensory (8 ) 0      Best Language (9 ) 0      Dysarthria (10 ) 0      Extinction and Inattention (11 ) (Formerly Neglect) 0      Total 0                First Filed Value   TPA Decision  Patient not a TPA candidate  Patient is not a candidate options  Unclear time of onset outside appropriate time window  Historical Information     Past Medical History:   Diagnosis Date    Hyperlipidemia     Hypertension     Migraine     Psychiatric disorder      Past Surgical History:   Procedure Laterality Date    EXPLORATORY LAPAROTOMY      HAND SURGERY      Hand excision of tendon cyst       Social History   Former Smoker: 5 pack year history  No alcohol use  No illicit drug use      Family History:   Family History   Problem Relation Age of Onset    Lung cancer Mother     Diabetes Father     Diabetes Other          Allergies   Allergen Reactions    Ace Inhibitors Anaphylaxis     Anaphylaxis    Atorvastatin Other (See Comments)     "throat closing up"    Barium Sulfate Other (See Comments)     hives throat closing    Other Other (See Comments)     Other reaction(s): angioadema  Other reaction(s): Other (See Comments)  Preservatives- itching throat closes, hi  Other reaction(s): angioadema  E Z Cat - hives throat closes itching,  Preservatives- itching throat closes, hi    Sulfa Antibiotics Other (See Comments)     stuffiness,itching,hives,throat closing     Meds:  all current active meds have been reviewed and PTA meds:   Prior to Admission Medications   Prescriptions Last Dose Informant Patient Reported? Taking?    LORazepam (ATIVAN) 0 5 mg tablet 10/18/2018 at Unknown time  Yes Yes   Sig: Take 0 5 mg by mouth every 6 (six) hours as needed     MULTIPLE VITAMIN PO Past Week at Unknown time  Yes Yes   Sig: Take by mouth   butalbital-acetaminophen-caffeine (FIORICET,ESGIC) -40 mg per tablet 10/19/2018 at Unknown time  Yes Yes   Sig: Take 1 tablet by mouth   celecoxib (CeleBREX) 100 mg capsule 10/18/2018 at Unknown time Self Yes Yes   Sig: Take 100 mg by mouth daily   fluticasone (FLONASE) 50 mcg/act nasal spray 10/18/2018 at Unknown time  Yes Yes   Si spray into each nostril 2 (two) times a day   fluticasone-vilanterol (BREO ELLIPTA) 200-25 MCG/INH inhaler 10/18/2018 at Unknown time  Yes Yes   Sig: Inhale 1 puff daily Rinse mouth after use    hydrochlorothiazide (HYDRODIURIL) 12 5 mg tablet 10/18/2018 at Unknown time  No Yes   Sig: Take 1 tablet (12 5 mg total) by mouth daily   ibuprofen (MOTRIN) 800 mg tablet Unknown at Unknown time  Yes No   Sig: Take by mouth 2 (two) times a day   metoprolol succinate (TOPROL-XL) 50 mg 24 hr tablet 10/18/2018 at Unknown time  No Yes   Sig: Take 2 tablets (100 mg total) by mouth daily   pantoprazole (PROTONIX) 40 mg tablet Past Month at Unknown time  No Yes   Sig: Take 1 tablet (40 mg total) by mouth daily   valsartan (DIOVAN) 160 mg tablet 10/18/2018 at Unknown time  No Yes   Sig: Take 1 tablet (160 mg total) by mouth daily      Facility-Administered Medications: None       Scheduled Meds:  Current Facility-Administered Medications:  acetaminophen 650 mg Oral Q6H PRN Rafaela Jimenez PA-C   aspirin 81 mg Oral Daily Rafaela Jimenez PA-C   butalbital-acetaminophen-caffeine 1 tablet Oral Q6H PRN Rafaela Jimenez PA-C   celecoxib 100 mg Oral Daily Rafaela Jimenez PA-C   enoxaparin 40 mg Subcutaneous Daily Rafaela Jimenez PA-C   fish oil 1,000 mg Oral Daily Imelda Guerrero PA-C   fluticasone 1 spray Nasal BID Rafaela Jimenez PA-C   fluticasone-vilanterol 1 puff Inhalation Daily Rafaela Jimenez PA-C hydrochlorothiazide 12 5 mg Oral Daily Leighton Gordillo PA-C   LORazepam 0 5 mg Oral Q6H PRN Leighton Gordillo PA-C   losartan 100 mg Oral Daily Leighton Gordillo PA-C   metoprolol succinate 100 mg Oral Daily Leighton Gordillo PA-C   pantoprazole 40 mg Oral Early Morning Leighton Gordillo PA-C     PRN Meds:   acetaminophen    butalbital-acetaminophen-caffeine    LORazepam        Physical Exam:   Objective   Vitals:Blood pressure 126/59, pulse 65, temperature 97 9 °F (36 6 °C), temperature source Oral, resp  rate 16, height 5' 4" (1 626 m), weight 79 3 kg (174 lb 12 8 oz), SpO2 94 %, not currently breastfeeding  ,Body mass index is 30 kg/m²  Physical Exam   Constitutional: She is oriented to person, place, and time  She appears well-developed and well-nourished  No distress  HENT:   Head: Normocephalic and atraumatic  Mouth/Throat: Oropharynx is clear and moist    Eyes: Pupils are equal, round, and reactive to light  EOM are normal  Right eye exhibits no discharge  Left eye exhibits no discharge  No scleral icterus  Neck: Normal range of motion  Cardiovascular: Normal rate, regular rhythm, normal heart sounds and intact distal pulses  Exam reveals no gallop and no friction rub  No murmur heard  Pulmonary/Chest: Effort normal and breath sounds normal  No stridor  No respiratory distress  She has no wheezes  She has no rales  Abdominal: Soft  Bowel sounds are normal  She exhibits no distension  There is no tenderness  Musculoskeletal: Normal range of motion  She exhibits no edema, tenderness or deformity  Neurological: She is alert and oriented to person, place, and time  She has normal strength  She has a normal Finger-Nose-Finger Test, a normal Heel to [employer] and a normal Romberg Test  Gait normal    Reflex Scores:       Tricep reflexes are 2+ on the right side and 2+ on the left side  Bicep reflexes are 2+ on the right side and 2+ on the left side         Brachioradialis reflexes are 2+ on the right side and 2+ on the left side  Patellar reflexes are 2+ on the right side and 2+ on the left side  Achilles reflexes are 1+ on the right side and 1+ on the left side  Skin: Skin is warm and dry  No erythema  Psychiatric: She has a normal mood and affect  Her speech is normal and behavior is normal  Judgment and thought content normal          Neurologic Exam     Mental Status   Oriented to person, place, and time  Follows 3 step commands  Attention: normal  Concentration: normal    Speech: speech is normal   Knowledge: good  Able to name object  Able to read  Able to repeat  Normal comprehension  Cranial Nerves     CN II   Visual fields full to confrontation  CN III, IV, VI   Pupils are equal, round, and reactive to light  Extraocular motions are normal    Right pupil: Size: 2 mm  Shape: regular  Left pupil: Size: 2 mm  Shape: regular  Nystagmus: none   Diplopia: bilateral and vertical    CN VII   Facial expression full, symmetric  CN VIII   Hearing: intact    CN IX, X   Palate: symmetric    CN XI   CN XI normal      CN XII   CN XII normal    Left face cheek with decreased sensation to temperature, touch and pinprick     Motor Exam   Muscle bulk: normal  Overall muscle tone: normal  Right arm pronator drift: absent  Left arm pronator drift: absent    Strength   Strength 5/5 throughout  Sensory Exam   Vibration normal    Right arm pinprick: normal  Decreased sensation in LUE and LLE to temperature, pinprick and light touch       Gait, Coordination, and Reflexes     Gait  Gait: normal    Coordination   Romberg: negative  Finger to nose coordination: normal  Heel to shin coordination: normal    Tremor   Resting tremor: absent    Reflexes   Right brachioradialis: 2+  Left brachioradialis: 2+  Right biceps: 2+  Left biceps: 2+  Right triceps: 2+  Left triceps: 2+  Right patellar: 2+  Left patellar: 2+  Right achilles: 1+  Left achilles: 1+  Right plantar: normal  Left plantar: normal      Lab Results:   I have personally reviewed pertinent reports  CBC:   Results from last 7 days  Lab Units 10/19/18  0521   WBC Thousand/uL 4 81   RBC Million/uL 3 52*   HEMOGLOBIN g/dL 11 1*   HEMATOCRIT % 33 2*   MCV fL 94   PLATELETS Thousands/uL 212   BMP/CMP:   Results from last 7 days  Lab Units 10/19/18  0521 10/19/18  0000   SODIUM mmol/L 136 132*   POTASSIUM mmol/L 3 8 4 4   CHLORIDE mmol/L 106 100   CO2 mmol/L 24 27   BUN mg/dL 19 25   CREATININE mg/dL 0 76 0 92   CALCIUM mg/dL 8 7 9 3   AST U/L  --  25   ALT U/L  --  22   ALK PHOS U/L  --  103   EGFR ml/min/1 73sq m 86 68   HgBA1C:   Results from last 7 days  Lab Units 10/19/18  0521   HEMOGLOBIN A1C % 5 4   Lipid Profile:   Results from last 7 days  Lab Units 10/19/18  0521   HDL mg/dL 59   LDL CALC mg/dL 193*   TRIGLYCERIDES mg/dL 153*     Imaging Studies: I have personally reviewed pertinent reports  and I have personally reviewed pertinent films in PACS    10/19/18 CT Head   Per report:  IMPRESSION:   No acute intracranial abnormality  EEG, Echo, Pathology, and Other Studies: I have personally reviewed pertinent reports     and I have personally reviewed pertinent films in PACS    VTE Prophylaxis: Sequential compression device (Venodyne)

## 2018-10-19 NOTE — ED ATTENDING ATTESTATION
Jerica Wyatt MD, saw and evaluated the patient  I have discussed the patient with the resident/non-physician practitioner and agree with the resident's/non-physician practitioner's findings, Plan of Care, and MDM as documented in the resident's/non-physician practitioner's note, except where noted  All available labs and Radiology studies were reviewed  At this point I agree with the current assessment done in the Emergency Department  I have conducted an independent evaluation of this patient including a focused history of:    Emergency Department Note- Nita Lozano 59 y o  female MRN: [id number]    Unit/Bed#: ED 01 Encounter: [id number]    Nita Lozano is a 59 y o  female who presents with   Chief Complaint   Patient presents with    CVA/TIA-like Symptoms     pt c/o left sided facial numbness/tingling that goes into her left arm and intermittent visual problems she noticed began about three days ago  also c/o chest pain    Chest Pain         History of Present Illness   HPI:  Nita Lozano is a 59 y o  female who presents for evaluation of:    Left facial tingling, left arm tingling, left-sided headache, intermittent chest pain, and transient loss of vision yesterday  The patient has no history of stroke  The chest pain she describes as sharp episodes that last for seconds to minutes  The facial tingling and numbness has been going on for several days  Occasionally the facial tingling and numbness extends into her left arm  Yesterday she had an episode that lasted about 1 minutes of transient left eye visual loss  Review of Systems   Constitutional: Negative for fatigue and fever  Cardiovascular: Positive for chest pain  Negative for palpitations and leg swelling  Neurological: Positive for numbness and headaches  Negative for syncope and facial asymmetry  All other systems reviewed and are negative        Historical Information   Past Medical History:   Diagnosis Date    Hyperlipidemia     Hypertension      Past Surgical History:   Procedure Laterality Date    EXPLORATORY LAPAROTOMY      HAND SURGERY      Hand excision of tendon cyst     Social History   History   Alcohol Use No     History   Drug Use No     History   Smoking Status    Never Smoker   Smokeless Tobacco    Never Used     Family History: non-contributory    Meds/Allergies   all medications and allergies reviewed  Allergies   Allergen Reactions    Ace Inhibitors Anaphylaxis     Anaphylaxis    Beta Adrenergic Blockers Anaphylaxis     Anaphylaxis    Iodinated Diagnostic Agents Anaphylaxis     Anaphylaxis    Atorvastatin Other (See Comments)    Barium Sulfate Other (See Comments)     hives throat closing    Other Other (See Comments)     Other reaction(s): angioadema  Other reaction(s): Other (See Comments)  Preservatives- itching throat closes, hi  Other reaction(s): angioadema  E Z Cat - hives throat closes itching,  Preservatives- itching throat closes, hi    Sulfa Antibiotics Other (See Comments)     stuffiness,itching,hives,throat closing       Objective   First Vitals:   Blood Pressure: (!) 193/92 (10/18/18 2319)  Pulse: 74 (10/18/18 2319)  Temperature: 98 3 °F (36 8 °C) (10/18/18 2322)  Temp Source: Oral (10/18/18 2322)  Respirations: 20 (10/18/18 2319)  Weight - Scale: 78 kg (172 lb) (10/18/18 2319)  SpO2: 98 % (10/18/18 2319)    Current Vitals:   Blood Pressure: (!) 193/92 (10/18/18 2319)  Pulse: 74 (10/18/18 2319)  Temperature: 98 3 °F (36 8 °C) (10/18/18 2322)  Temp Source: Oral (10/18/18 2322)  Respirations: 20 (10/18/18 2319)  Weight - Scale: 78 kg (172 lb) (10/18/18 2319)  SpO2: 98 % (10/18/18 2319)    No intake or output data in the 24 hours ending 10/19/18 0009    Invasive Devices          No matching active lines, drains, or airways          Physical Exam   Constitutional: She is oriented to person, place, and time  She appears well-developed and well-nourished     HENT:   Head: Normocephalic and atraumatic  Cardiovascular: Normal rate and regular rhythm  Pulmonary/Chest: Effort normal and breath sounds normal    Abdominal: Soft  Bowel sounds are normal    Musculoskeletal: Normal range of motion  She exhibits no deformity  Neurological: She is alert and oriented to person, place, and time  Coordination normal    Skin: Skin is warm and dry  Psychiatric: She has a normal mood and affect  Her behavior is normal  Judgment and thought content normal    Nursing note and vitals reviewed  Medical Decision Makin  Left facial numbness and tingling associated with left upper extremity tingling and numbness, headache, and chest pain:  Symptoms are inconsistent for ischemic stroke  Plan to obtain a CT scan of the head to rule out stroke  Plan to obtain troponin an ECG to rule out acute coronary syndrome  No results found for this or any previous visit (from the past 36 hour(s))  CT head without contrast    (Results Pending)   XR chest 2 views    (Results Pending)         Portions of the record may have been created with voice recognition software  Occasional wrong word or "sound a like" substitutions may have occurred due to the inherent limitations of voice recognition software  Read the chart carefully and recognize, using context, where substitutions have occurred

## 2018-10-19 NOTE — ED PROVIDER NOTES
History  Chief Complaint   Patient presents with    CVA/TIA-like Symptoms     pt c/o left sided facial numbness/tingling that goes into her left arm and intermittent visual problems she noticed began about three days ago  also c/o chest pain    Chest Pain     This 59-year-old female with previous medical history of hypertension and anxiety  Patient presents with 3 days of numbness, paresthesias in her left face and her left upper extremity  Patient also notes that she has had problems with her balance, problem speaking today  Patient also notes that she had blackening of her vision in her left eye that resolved spontaneously yesterday  This episode lasted for a few minutes  Patient also notes that she has had chest pain since this morning  Patient's chest pain is located in her left chest   The pain is sharp  Pain radiates to the back  Patient notes that she had a recent upper respiratory infection  Patient was prescribed a Medrol Dose pack on Tuesday of last week for the upper respiratory infection  Patient also notes that her blood pressures been running high recently  Patient was instructed increase her metoprolol dose last week  Patient's blood pressures been running typically into the 537P systolic over the last week  Patient had an echo done 3 years ago which was normal   Patient also had an ultrasound of her carotids which was normal roughly 3 years ago            CVA/TIA-like Symptoms   Presenting symptoms: headaches, loss of balance, sensory loss and visual change (Yesterday patient had transient loss of vision in her left eye )    Presenting symptoms: no change in level of consciousness and no weakness    Sensory loss:     Location:  L facial and L upper extremity    Severity:  Mild  Onset quality:  Gradual  Timing:  Constant  Progression:  Worsening  Similar to previous episodes: no    Associated symptoms: chest pain, dizziness, nausea and paresthesias    Associated symptoms: no trouble swallowing, no facial pain, no fever and no hearing loss    Chest Pain   Associated symptoms: dizziness, headache, nausea, numbness and palpitations    Associated symptoms: no dysphagia, no fever and no weakness        Prior to Admission Medications   Prescriptions Last Dose Informant Patient Reported? Taking?    LORazepam (ATIVAN) 0 5 mg tablet 10/18/2018 at Unknown time  Yes Yes   Sig: Take 0 5 mg by mouth every 6 (six) hours as needed     MULTIPLE VITAMIN PO Past Week at Unknown time  Yes Yes   Sig: Take by mouth   butalbital-acetaminophen-caffeine (FIORICET,ESGIC) -40 mg per tablet 10/19/2018 at Unknown time  Yes Yes   Sig: Take 1 tablet by mouth   celecoxib (CeleBREX) 100 mg capsule 10/18/2018 at Unknown time Self Yes Yes   Sig: Take 100 mg by mouth daily   fluticasone (FLONASE) 50 mcg/act nasal spray 10/18/2018 at Unknown time  Yes Yes   Si spray into each nostril 2 (two) times a day   fluticasone-vilanterol (BREO ELLIPTA) 200-25 MCG/INH inhaler 10/18/2018 at Unknown time  Yes Yes   Sig: Inhale 1 puff daily Rinse mouth after use    hydrochlorothiazide (HYDRODIURIL) 12 5 mg tablet 10/18/2018 at Unknown time  No Yes   Sig: Take 1 tablet (12 5 mg total) by mouth daily   ibuprofen (MOTRIN) 800 mg tablet Unknown at Unknown time  Yes No   Sig: Take by mouth 2 (two) times a day   metoprolol succinate (TOPROL-XL) 50 mg 24 hr tablet 10/18/2018 at Unknown time  No Yes   Sig: Take 2 tablets (100 mg total) by mouth daily   pantoprazole (PROTONIX) 40 mg tablet Past Month at Unknown time  No Yes   Sig: Take 1 tablet (40 mg total) by mouth daily   valsartan (DIOVAN) 160 mg tablet 10/18/2018 at Unknown time  No Yes   Sig: Take 1 tablet (160 mg total) by mouth daily      Facility-Administered Medications: None       Past Medical History:   Diagnosis Date    Hyperlipidemia     Hypertension     Migraine     Psychiatric disorder        Past Surgical History:   Procedure Laterality Date    EXPLORATORY LAPAROTOMY      HAND SURGERY      Hand excision of tendon cyst       Family History   Problem Relation Age of Onset    Lung cancer Mother     Diabetes Father     Diabetes Other      I have reviewed and agree with the history as documented  Social History   Substance Use Topics    Smoking status: Former Smoker     Packs/day: 0 50     Years: 10 00    Smokeless tobacco: Never Used    Alcohol use No        Review of Systems   Constitutional: Negative for fever  HENT: Negative for hearing loss and trouble swallowing  Cardiovascular: Positive for chest pain and palpitations  Gastrointestinal: Positive for nausea  Neurological: Positive for dizziness, speech difficulty, numbness, headaches, paresthesias and loss of balance  Negative for weakness  All other systems reviewed and are negative  Physical Exam  ED Triage Vitals   Temperature Pulse Respirations Blood Pressure SpO2   10/18/18 2322 10/18/18 2319 10/18/18 2319 10/18/18 2319 10/18/18 2319   98 3 °F (36 8 °C) 74 20 (!) 193/92 98 %      Temp Source Heart Rate Source Patient Position - Orthostatic VS BP Location FiO2 (%)   10/18/18 2322 10/19/18 0119 10/19/18 0119 10/19/18 0300 --   Oral Monitor Lying Left arm       Pain Score       10/19/18 0345       4           Orthostatic Vital Signs  Vitals:    10/19/18 0019 10/19/18 0119 10/19/18 0219 10/19/18 0300   BP: 112/52 121/63 (!) 120/47 132/63   Pulse:  60 65 62   Patient Position - Orthostatic VS:  Lying Lying Lying       Physical Exam   Constitutional: She appears well-developed and well-nourished  No distress  HENT:   Head: Normocephalic and atraumatic  Right Ear: External ear normal    Left Ear: External ear normal    Eyes: Conjunctivae and EOM are normal    Neck: No JVD present  No tracheal deviation present  Cardiovascular: Normal rate, regular rhythm, normal heart sounds and intact distal pulses  No murmur heard  Pulmonary/Chest: Breath sounds normal  No stridor   No respiratory distress  She has no wheezes  She has no rales  Abdominal: Soft  Bowel sounds are normal  She exhibits no distension and no mass  There is no tenderness  There is no rebound and no guarding  Genitourinary:   Genitourinary Comments: Deferred   Musculoskeletal: She exhibits no edema, tenderness or deformity  Neurological: No cranial nerve deficit  She exhibits normal muscle tone  Coordination normal    Cranial nerves 2-12 intact bilaterally except for decreased sensation in trigeminal nerve maxillary and mandibular branches  Patient also describes some decreased sensation in the left periauricular area as well as a slight decrease in sensation occasionally in the left shoulder  The intact light sensation in the upper lower extremities bilaterally  Muscular strength 5/5 in the upper and lower extremities bilaterally  Negative pronator drift  Negative point-to-point exam   Negative heel to shin exam   Patient ambulates without any gross abnormalities  Skin: Skin is warm and dry  Capillary refill takes less than 2 seconds  No rash noted  She is not diaphoretic  No erythema  No pallor  Psychiatric: She has a normal mood and affect  Her behavior is normal  Judgment and thought content normal    Nursing note and vitals reviewed        ED Medications  Medications   butalbital-acetaminophen-caffeine (FIORICET,ESGIC) -40 mg per tablet 1 tablet (not administered)   celecoxib (CeleBREX) capsule 100 mg (not administered)   fluticasone (FLONASE) 50 mcg/act nasal spray 1 spray (not administered)   fluticasone-vilanterol (BREO ELLIPTA) 200-25 MCG/INH inhaler 1 puff (not administered)   hydrochlorothiazide (HYDRODIURIL) tablet 12 5 mg (not administered)   LORazepam (ATIVAN) tablet 0 5 mg (not administered)   pantoprazole (PROTONIX) EC tablet 40 mg (not administered)   losartan (COZAAR) tablet 100 mg (not administered)   aspirin chewable tablet 81 mg (not administered)   enoxaparin (LOVENOX) subcutaneous injection 40 mg (not administered)   acetaminophen (TYLENOL) tablet 650 mg (not administered)   metoprolol succinate (TOPROL-XL) 24 hr tablet 100 mg (not administered)   fish oil capsule 1,000 mg (not administered)       Diagnostic Studies  Results Reviewed     Procedure Component Value Units Date/Time    Comprehensive metabolic panel [[id number]]  (Abnormal) Collected:  10/19/18 0000    Lab Status:  Final result Specimen:  Blood from Arm, Right Updated:  10/19/18 0033     Sodium 132 (L) mmol/L      Potassium 4 4 mmol/L      Chloride 100 mmol/L      CO2 27 mmol/L      ANION GAP 5 mmol/L      BUN 25 mg/dL      Creatinine 0 92 mg/dL      Glucose 89 mg/dL      Calcium 9 3 mg/dL      AST 25 U/L      ALT 22 U/L      Alkaline Phosphatase 103 U/L      Total Protein 8 0 g/dL      Albumin 4 3 g/dL      Total Bilirubin 0 28 mg/dL      eGFR 68 ml/min/1 73sq m     Narrative:         National Kidney Disease Education Program recommendations are as follows:  GFR calculation is accurate only with a steady state creatinine  Chronic Kidney disease less than 60 ml/min/1 73 sq  meters  Kidney failure less than 15 ml/min/1 73 sq  meters  Troponin I [[id number]]  (Normal) Collected:  10/19/18 0000    Lab Status:  Final result Specimen:  Blood from Arm, Right Updated:  10/19/18 0032     Troponin I <0 02 ng/mL                  XR chest 2 views   ED Interpretation by Yvonne Maxwell DO (10/19 0124)   No acute findings  CT head without contrast   Final Result by Kate Roman MD (10/19 0105)      No acute intracranial abnormality                    Workstation performed: [id number]               Procedures  ECG 12 Lead Documentation  Date/Time: 10/19/2018 1:07 AM  Performed by: Sulaiman Sellers  Authorized by: Sulaiman Sellers     Indications / Diagnosis:  Chest Pain  ECG reviewed by me, the ED Provider: yes    Patient location:  ED  Previous ECG:     Previous ECG:  Compared to current    Comparison ECG info:  02-Jul-2010 Similarity:  No change    Comparison to cardiac monitor: Yes    Interpretation:     Interpretation: abnormal    Rate:     ECG rate:  64    ECG rate assessment: normal    Rhythm:     Rhythm: sinus rhythm    Ectopy:     Ectopy: none    QRS:     QRS axis:  Left  ST segments:     ST segments:  Normal  T waves:     T waves: normal    Other findings:     Other findings: LVH                Phone Consults  ED Phone Contact    ED Course                   Stroke Assessment     Row Name 10/19/18 0100             NIH Stroke Scale    Interval Baseline      Level of Consciousness (1a ) 0      LOC Questions (1b ) 0      LOC Commands (1c ) 0      Best Gaze (2 ) 0      Visual (3 ) 0      Facial Palsy (4 ) 0      Motor Arm, Left (5a ) 0      Motor Arm, Right (5b ) 0      Motor Leg, Left (6a ) 0      Motor Leg, Right (6b ) 0      Limb Ataxia (7 ) 0      Sensory (8 ) 0      Best Language (9 ) 0      Dysarthria (10 ) 0      Extinction and Inattention (11 ) (Formerly Neglect) 0      Total 0                First Filed Value   TPA Decision  Patient not a TPA candidate  Patient is not a candidate options  Unclear time of onset outside appropriate time window  MDM  Number of Diagnoses or Management Options  Chest pain: new and requires workup  Numbness and tingling of left side of face: new and requires workup  Diagnosis management comments: Patient's neurological examination does not give me a high suspicion of a stroke and the patient is outside of the tPA window or clot retrieval window  Patient's evaluation is more likely a peripheral nerve palsy  More concerning symptom was the darkening of the vision yesterday  Patient will be evaluated stroke with a CT head noncontrast   Patient's chest pain will be evaluated with troponin, ECG, chest x-ray  Patient's ECG, troponin, chest x-ray were without gross abnormalities  Patient will likely need admittance for stroke rule out    I will obtain a CBC and a BMP in the anticipation for admittance    Patient admitted to [facility] for CVA rule out  Amount and/or Complexity of Data Reviewed  Clinical lab tests: ordered and reviewed  Tests in the radiology section of CPT®: ordered and reviewed  Decide to obtain previous medical records or to obtain history from someone other than the patient: yes  Review and summarize past medical records: yes  Independent visualization of images, tracings, or specimens: yes    Risk of Complications, Morbidity, and/or Mortality  Presenting problems: high  Diagnostic procedures: high  Management options: high    Patient Progress  Patient progress: stable    CritCare Time    Disposition  Final diagnoses:   Numbness and tingling of left side of face   Chest pain     Time reflects when diagnosis was documented in both MDM as applicable and the Disposition within this note     Time User Action Codes Description Comment    10/19/2018  2:11 AM Santa Goldstein Add [R20 0,  R20 2] Numbness and tingling of left side of face     10/19/2018  2:11 AM Luis Alberto Koenig Add [R07 9] Chest pain     10/19/2018  2:49 AM Herlene Laud H Add [G45 3] Bilateral amaurosis fugax     10/19/2018  2:49 AM Herlene Laud H Modify [G45 3] Bilateral amaurosis fugax       ED Disposition     ED Disposition Condition Comment    Admit  Case was discussed with Dr Danielle Araiza and the patient's admission status was agreed to be Admission Status: observation status to the service of Dr Danielle Araiza           Follow-up Information    None         Current Discharge Medication List      CONTINUE these medications which have NOT CHANGED    Details   butalbital-acetaminophen-caffeine (FIORICET,ESGIC) -40 mg per tablet Take 1 tablet by mouth      celecoxib (CeleBREX) 100 mg capsule Take 100 mg by mouth daily      fluticasone (FLONASE) 50 mcg/act nasal spray 1 spray into each nostril 2 (two) times a day      fluticasone-vilanterol (BREO ELLIPTA) 200-25 MCG/INH inhaler Inhale 1 puff daily Rinse mouth after use       hydrochlorothiazide (HYDRODIURIL) 12 5 mg tablet Take 1 tablet (12 5 mg total) by mouth daily  Qty: 90 tablet, Refills: 1    Associated Diagnoses: Essential hypertension      LORazepam (ATIVAN) 0 5 mg tablet Take 0 5 mg by mouth every 6 (six) hours as needed        metoprolol succinate (TOPROL-XL) 50 mg 24 hr tablet Take 2 tablets (100 mg total) by mouth daily  Qty: 90 tablet, Refills: 0    Associated Diagnoses: Essential hypertension      MULTIPLE VITAMIN PO Take by mouth      pantoprazole (PROTONIX) 40 mg tablet Take 1 tablet (40 mg total) by mouth daily  Qty: 90 tablet, Refills: 1    Associated Diagnoses: Benign essential hypertension      valsartan (DIOVAN) 160 mg tablet Take 1 tablet (160 mg total) by mouth daily  Qty: 90 tablet, Refills: 0    Associated Diagnoses: Hypertension, unspecified type      ibuprofen (MOTRIN) 800 mg tablet Take by mouth 2 (two) times a day           No discharge procedures on file  ED Provider  Attending physically available and evaluated John Wilson I managed the patient along with the ED Attending      Electronically Signed by         Lynn Villegas DO  10/19/18 6125

## 2018-10-19 NOTE — UTILIZATION REVIEW
Thank you,  [address]  Utilization Review Department  Phone: [phone]; Fax [phone]  ATTENTION: Please call with any questions or concerns to [phone]  and carefully follow the prompts so that you are directed to the right person  Send all requests for admission clinical reviews, approved or denied determinations and any other requests to fax [phone]  All voicemails are confidential            Initial Clinical Review    Admission: Date/Time/Statement: 10/19/18 AT 0210 OBSERVATION    Orders Placed This Encounter   Procedures    Place in Observation (expected length of stay for this patient is less than two midnights)     Standing Status:   Standing     Number of Occurrences:   1     Order Specific Question:   Admitting Physician     Answer:   Carol Shukla [[id number]]     Order Specific Question:   Level of Care     Answer:   Med Surg [16]       ED: Date/Time/Mode of Arrival:   ED Arrival Information     Expected Arrival Acuity Means of Arrival Escorted By Service Admission Type    - 10/18/2018 23:11 Emergent Walk-In Self General Medicine Emergency    Arrival Complaint    stroke symptoms          Chief Complaint:   Chief Complaint   Patient presents with    CVA/TIA-like Symptoms     pt c/o left sided facial numbness/tingling that goes into her left arm and intermittent visual problems she noticed began about three days ago  also c/o chest pain    Chest Pain     Chief Complaint:   Lt sided facial numbness with LUE numbness and tingling  Brief loss of vision Lt eye that is now resolved      History of Present Illness:   Diego Cespedes is a 59 y o  female with PMH of HTN, HLD, anxiety, GERD, migraines and venous insufficiency who presents with Lt sided facial numbness with LUE numbness and tingling x 3 days  Also reports brief loss of vision in Lt eye that lasted 5-10 minutes yesterday as well as slurred speech and balance issues x 1 day   States has had Lt sided CHP that radiates into back x 1 week  Recently diagnosed with URI on 10/16, and was prescribed medrol dose pack  Reports symptoms now resolved  Denies N/V/D  (+) reflux  (+) night sweats  (+) palpitations  Denies fever  Denies SOB  Denies light-headedness or syncopal episodes       Review of Systems:   Constitutional: Positive for diaphoresis  Negative for activity change, appetite change and fever  HENT: Positive for congestion  Negative for ear pain and trouble swallowing  Eyes: Positive for visual disturbance  Negative for photophobia and pain  Respiratory: Negative for cough, shortness of breath and wheezing  Cardiovascular: Positive for chest pain and palpitations  Negative for leg swelling  Gastrointestinal: Negative for abdominal pain, constipation, diarrhea, nausea and vomiting  Genitourinary: Negative for difficulty urinating, dysuria and urgency  Musculoskeletal: Positive for gait problem  Negative for neck pain and neck stiffness  Neurological: Positive for speech difficulty  Negative for dizziness, syncope, facial asymmetry, weakness and light-headedness          ED Vital Signs:   ED Triage Vitals   Temperature Pulse Respirations Blood Pressure SpO2   10/18/18 2322 10/18/18 2319 10/18/18 2319 10/18/18 2319 10/18/18 2319   98 3 °F (36 8 °C) 74 20 (!) 193/92 98 %      Temp Source Heart Rate Source Patient Position - Orthostatic VS BP Location FiO2 (%)   10/18/18 2322 10/19/18 0119 10/19/18 0119 10/19/18 0300 --   Oral Monitor Lying Left arm       Pain Score       10/19/18 0345       4        Wt Readings from Last 1 Encounters:   10/19/18 79 3 kg (174 lb 12 8 oz)      10/18 0701  10/19 0700 10/19 0701  10/19 0921      Temperature (°F) 97  998 3 97 9      Pulse 6074 65      Respirations 1820 16      Blood Pressure 112/52193/92 126/59      SpO2 (%) 9598 94          LABSDiagnostic Test Results:   CBC and differential [[id number]] (Abnormal) Collected: 10/19/18 0521   Lab Status: Final result Specimen: Blood from Arm, Left Updated: 10/19/18 0707    WBC 4 81 4 31 - 10 16 Thousand/uL     RBC 3 52 (L) 3 81 - 5 12 Million/uL     Hemoglobin 11 1 (L) 11 5 - 15 4 g/dL     Hematocrit 33 2 (L) 34 8 - 46 1 %     MCV 94 82 - 98 fL     MCH 31 5 26 8 - 34 3 pg     MCHC 33 4 31 4 - 37 4 g/dL     RDW 12 5 11 6 - 15 1 %     MPV 9 3 8 9 - 12 7 fL     Platelets 171 751 - 257 Thousands/uL     nRBC 0 /100 WBCs     Neutrophils Relative 41 (L) 43 - 75 %     Immat GRANS % 0 0 - 2 %     Lymphocytes Relative 45 (H) 14 - 44 %     Monocytes Relative 7 4 - 12 %     Eosinophils Relative 6 0 - 6 %     Basophils Relative 1 0 - 1 %     Neutrophils Absolute 1 97 1 85 - 7 62 Thousands/µL     Immature Grans Absolute 0 02 0 00 - 0 20 Thousand/uL     Lymphocytes Absolute 2 14 0 60 - 4 47 Thousands/µL     Monocytes Absolute 0 34 0 17 - 1 22 Thousand/µL     Eosinophils Absolute 0 29 0 00 - 0 61 Thousand/µL     Basophils Absolute 0 05 0 00 - 0 10 Thousands/µL      CMP  Results from last 7 days  Lab Units 10/19/18  0000   SODIUM mmol/L 132*   POTASSIUM mmol/L 4 4   CHLORIDE mmol/L 100   CO2 mmol/L 27   BUN mg/dL 25   CREATININE mg/dL 0 92   ANION GAP mmol/L 5   CALCIUM mg/dL 9 3   ALBUMIN g/dL 4 3   TOTAL BILIRUBIN mg/dL 0 28   ALK PHOS U/L 103   ALT U/L 22   AST U/L 25     TROPONIN NEG X 2    CT HEAD _  No acute intracranial abnormality  Past Medical/Surgical History:    Active Ambulatory Problems     Diagnosis Date Noted    Acute upper respiratory infection 02/24/2017    Allergic rhinitis 10/14/2016    Angina pectoris ([address] ) 11/15/2017    Anxiety and depression 10/14/2016    Arthritis 08/01/2017    Asthma 10/14/2016    Attention disturbance 01/26/2017    Benign essential hypertension 10/14/2016    Bilateral amaurosis fugax 11/17/2016    Chronic interstitial cystitis 10/14/2016    Chronic low back pain 10/14/2016    Diverticulitis of colon 10/14/2016    Familial hyperlipidemia 12/02/2016    Elevated antinuclear antibody (SON) level 10/14/2016    Elevated blood sugar 10/14/2016    Essential hypertension 08/13/2015    Female pelvic pain 09/25/2017    Hyperlipidemia 10/14/2016    Lipid disorder 08/13/2015    Migraines 10/14/2016    Obesity 03/24/2011    Tick bite 07/14/2017    Tobacco abuse 08/13/2015    Venous insufficiency 08/01/2017     Resolved Ambulatory Problems     Diagnosis Date Noted    No Resolved Ambulatory Problems     Past Medical History:   Diagnosis Date    Hyperlipidemia     Hypertension     Migraine     Psychiatric disorder        Admitting Diagnosis: Chest pain [R07 9]  Bilateral amaurosis fugax [G45 3]  Stroke-like symptoms [R29 90]  Numbness and tingling of left side of face [R20 0, R20 2]    Age/Sex: 59 y o  female      Assessment/Plan:   * Stroke-like symptoms   Assessment & Plan     - Lt sided facial numbness with LUE numbness and tingling x 3 days  Reports loss of vision to Lt eye x 5-10 minutes 10/18/2018  States was having slurred speech and balance issues as well  - No focal deficits noted at this time  - Symptoms may be atypical migraine vs TIA vs HTN urgency vs anxiety issues  - Initial /92, now 120/47 without intervention  - CT head reveals no acute intracranial abnormality  - Carotid doppler 11/2016 revealed <50% stenosis bilaterally  - Echocardiogram 11/2016 revealed LVEF 65%   Mild mitral valve regurg, Mild/mod aortic valve regurg with no stenosis, trace tricuspid regurg   - Consult neurology      Benign essential hypertension   Assessment & Plan     - BP initially 193/92, now 120/47 without intervention in ED  - Continue home dose metoprolol 100 mg qd, HCTZ 12 5 mg qd  - Monitor BP per unit protocol      Migraines   Assessment & Plan     - Reports history of migraines with related visual changes  - Continue home dose PRN Fioricet  - Consult neurology as above      Chest pain   Assessment & Plan     - Reports Lt sided CHP that radiates into back x 1 week with associated palpitations and intermittent "night sweats"  - Initial troponin (-)  Will trend  - 12 lead EKG reveals SR with no ischemic changes      Anxiety   Assessment & Plan     - Continue ativan 0 5 mg qid prn      Hyperlipidemia   Assessment & Plan     - States "throat swells up" with multiple statins  Has been prescribed Repatha, but unable to afford prescription  States currently taking red yeast rice and occasionally takes fish oil when she "can afford it "  - Last FLP 11/2017  Will recheck in am  - Start Fish oil 1g daily      Bilateral amaurosis fugax   Assessment & Plan     - Reports intermittent "black spots" in vision   - Had workup with carotid doppler and echo in 2016 that was (-), as well as opthalmology consult            VTE Prophylaxis: Enoxaparin (Lovenox)  / sequential compression device     Code Status: Level 1 Full Code     Anticipated Length of Stay:  Patient will be admitted on an Observation basis with an anticipated length of stay of  Less than 2 midnights      Justification for Hospital Stay: See above       Admission Orders:  10/19/18 AT 0210 OBSERVATION  TELEMETRY  VS + NEURO CHECKS Q4HRS    STROKE PATHWAY    Up + OOB as Tolerated  SCD    Regular House Diet    IV ACCESS    Scheduled Meds:   Current Facility-Administered Medications:  acetaminophen 650 mg Oral Q6H PRN Rimma Lomas, PA-C   aspirin 81 mg Oral Daily Rimma Lomas, PA-C   butalbital-acetaminophen-caffeine 1 tablet Oral Q6H PRN Rimma Lomas, PA-C   celecoxib 100 mg Oral Daily Rimma Lomas, PA-C   enoxaparin 40 mg Subcutaneous Daily Rimma Lomas, PA-C   fish oil 1,000 mg Oral Daily Angelika Calixto, PA-C   fluticasone 1 spray Nasal BID Rimma Lomas, PA-C   fluticasone-vilanterol 1 puff Inhalation Daily Rimma Lomas, PA-C   hydrochlorothiazide 12 5 mg Oral Daily Rimma Lomas, PA-C   LORazepam 0 5 mg Oral Q6H PRN Rimma Lomas, PA-C   losartan 100 mg Oral Daily Rimma Lomas, PA-C   metoprolol succinate 100 mg Oral Daily Ellyn Aschoff, PA-C   pantoprazole 40 mg Oral Early Morning Ellyn Aschoff, PA-C       PRN Meds:     acetaminophen    butalbital-acetaminophen-caffeine    LORazepam 0 5 mg q6hrs prn given x 1    CONSULT NEURO     PT EVAL    OT EVAL    SPEECH EVAL

## 2018-10-19 NOTE — ASSESSMENT & PLAN NOTE
- Lt sided facial numbness with LUE numbness and tingling x 3 days  Reports loss of vision to Lt eye x 5-10 minutes 10/18/2018  States was having slurred speech and balance issues as well  - No focal deficits noted at this time  - Symptoms may be atypical migraine vs TIA vs HTN urgency vs anxiety issues  - Initial /92, now 120/47 without intervention  - CT head reveals no acute intracranial abnormality  - Carotid doppler 11/2016 revealed <50% stenosis bilaterally  - Echocardiogram 11/2016 revealed LVEF 65%   Mild mitral valve regurg, Mild/mod aortic valve regurg with no stenosis, trace tricuspid regurg   - Consult neurology

## 2018-10-19 NOTE — ASSESSMENT & PLAN NOTE
- States "throat swells up" with multiple statins  Has been prescribed Repatha, but unable to afford prescription  States currently taking red yeast rice and occasionally takes fish oil when she "can afford it "  - Last FLP 11/2017   Will recheck in am  - Start Fish oil 1g daily

## 2018-10-19 NOTE — PHYSICAL THERAPY NOTE
Physical Therapy Initial Evaluation  Janet Pearson, PT   10/19/18 8042   Note Type   Note type Eval only   Pain Assessment   Pain Assessment 0-10   Pain Score 4   Pain Type Acute pain   Pain Location Head  (L side headache  )   Hospital Pain Intervention(s) Ambulation/increased activity;Repositioned; Emotional support   Response to Interventions tolerated  Home Living   Type of [address] One level; Other (Comment); Performs ADLs on one level  (1 XIOMARA no railing  )   Additional Comments Pt was independent without a device prior to admission  Worked full time as x-ray tech  (" At work 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 Seemed forgetful  )   General   Additional Pertinent History dx: adm with L facial and UE numbness/tingling, stroke-like symptoms  PMH: migraines, chest pain, HTN, had temporary vision loss and slurred speech just prior to this admission, angina, L hip arthritis, asthma, attention disturbance, URI, LBP, female pelvic pain, obesity, tick bite, venous insufficiency, anxiety, psychiatric disorder, hand surgery  Family/Caregiver Present No   Cognition   Overall Cognitive Status Unable to assess  (Decreased memory, ? possible cog deficit? ? )   Arousal/Participation Alert  (Very flat affect, slow to process information, repeated ?'s )   Orientation Level Oriented to person;Oriented to place;Oriented to situation   Memory Decreased short term memory   Following Commands Follows all commands and directions without difficulty  (But sluggish to do so  )   Comments Rec cog assessment  RUE Assessment   RUE Assessment WFL  (strength seemed equal in UE's  )   LUE Assessment   LUE Assessment WFL   RLE Assessment   RLE Assessment WFL  (4+/5)   LLE Assessment   LLE Assessment WFL  (4+/5  Inc time to generate max contraction L LE  )   Coordination   Movements are Fluid and Coordinated (Not for high level coordination activities  IE tandem walkin)   Sensation WFL   Bed Mobility   Supine to Sit 6  Modified independent   Sit to Supine 6  Modified independent   Transfers   Sit to Stand 7  Independent   Stand to Sit 7  Independent   Additional Comments No AD needed or used  Ambulation/Elevation   Gait pattern Excessively slow   Gait Assistance 7  Independent   Assistive Device None   Distance total 200'   Stair Management Assistance 5  Supervision   Additional items Assist x 1   Stair Management Technique One rail R   Number of Stairs 4   Balance   Static Sitting Normal   Dynamic Sitting Good   Static Standing Good   Dynamic Standing Fair +   Ambulatory Fair +   Higher level balance Tandem  (Also sig LOB with single leg stance eyes open& closed  )   Higher level balance rep range to the L and to the R  (LOB to both sides, slight LOB braiding to both side  )   Endurance Deficit   Endurance Deficit No   Activity Tolerance   Activity Tolerance Patient tolerated treatment well   Medical Staff Made Aware RN consented to allow pt to participate in PT eval      Nurse Made Aware yes   also made aware of PT rec's for d/c  Assessment   Prognosis Good   Problem List Impaired balance;Decreased coordination;Decreased mobility; Decreased cognition;Pain   Assessment Pt is 59 y o  female seen for PT evaluation s/p admit to [facility] on 10/18/2018 w/ Stroke-like symptoms  Pt has a recent hx of L facial and arm numbness/tingling, slurred speech, and vision loss    At this time pt reports continued but lessened facial and arm paresthesias and some continued speech difficulties  PT consulted to assess pt's functional mobility and d/c needs  Order placed for PT eval and tx, w/ up as tolerated order  Comorbidities affecting pt's physical performance at time of assessment include:pt with hx of HTN, diverticulitis of colon, attention disturbance, chronic LBP, female pelvic pain, obesity, tick bite, venous insufficiency, anxiety, psychiatric disorder and hand surgery  PTA, pt was independent w/ all functional mobility w/ no need for an assistive device for ambulation , ambulates community distances and elevations, has 1 XIOMARA, lives alone in 1 level apartment, works full time and stated recently she has 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 for balance and has been forgetful    Personal factors affecting pt at time of IE include: stairs to enter home, limited home support, anxiety, inability to perform current job functions, inability to perform IADLs and states she continues to have some slurring of her words and some mental slowness  Please find objective findings from PT assessment regarding body systems outlined above with impairments and limitations including impaired balance, impaired coordination, pain and with possible cognative issues  Pt would benefit from a cognative assessment  ( Note pt lives alone, worked full time, and drove her own car independently prior to admission ) The following objective measures performed on IE also reveal limitations: Barthel Index: 95/100  Pt's clinical presentation is currently unstable/unpredictable seen in pt's presentation of Pt continues to have stroke-like symptoms , pt with 4/10 headache, pt with pending CTA of head/neck, pt with the need for neuro checks and telemetry, pt with hx of anxiety, pt lives alone, pt with below baseline level of mobility  Pt to benefit from continued PT tx to address deficits as defined above and maximize level of functional independent mobility and consistency   From PT/mobility standpoint, recommendation at time of d/c would be home alone with outpt PT rx's pending ok outcome on cognative evaluation and pending physicians approval for pt to drive independently/live independently pending progress in order to facilitate    Barriers to Discharge Decreased caregiver support   Goals   Patient Goals " I want my slurred speech to go away "  Pt asked what PT goals were and she did not have any  STG Expiration Date 10/29/18   Short Term Goal #1 Pt demonstrates having fair+ balance for unsupported  high level balance and coordination dynamic activities    (Pt ambulates up and down 1 flight of steps independently   )   Treatment Day 0   Plan   Treatment/Interventions Patient/family training;Gait training;Spoke to nursing;Spoke to case management  (high level balance and coordination activities  )   PT Frequency 1-2x/wk   Recommendation   Recommendation Other (Comment)  (If cog eval ok then home with outpt PT rx's  )   Equipment Recommended Other (Comment)  (none )   Modified Jyoti Scale   Modified [place] Scale 2   Barthel Index   Feeding 10   Bathing 5   Grooming Score 5   Dressing Score 10   Bladder Score 10   Bowels Score 10   Toilet Use Score 10   Transfers (Bed/Chair) Score 15   Mobility (Level Surface) Score 15   Stairs Score 5   Barthel Index Score 95

## 2018-10-19 NOTE — ASSESSMENT & PLAN NOTE
- Reports Lt sided CHP that radiates into back x 1 week with associated palpitations and intermittent "night sweats"  - Initial troponin (-)   Will trend  - 12 lead EKG reveals SR with no ischemic changes

## 2018-10-19 NOTE — ASSESSMENT & PLAN NOTE
- Reports history of migraines with related visual changes  - Continue home dose PRN Fioricet  - Consult neurology as above

## 2018-10-19 NOTE — OCCUPATIONAL THERAPY NOTE
[address]  Evaluation     Patient Name: Lima Greenwood  Today's Date: 10/19/2018  Problem List  Patient Active Problem List   Diagnosis    Acute upper respiratory infection    Allergic rhinitis    Angina pectoris (HCC)    Anxiety and depression    Arthritis    Asthma    Attention disturbance    Benign essential hypertension    Bilateral amaurosis fugax    Chronic interstitial cystitis    Chronic low back pain    Diverticulitis of colon    Familial hyperlipidemia    Elevated antinuclear antibody (SON) level    Elevated blood sugar    Essential hypertension    Female pelvic pain    Hyperlipidemia    Lipid disorder    Migraines    Obesity    Tick bite    Tobacco abuse    Venous insufficiency    Stroke-like symptoms    Anxiety    Chest pain     Past Medical History  Past Medical History:   Diagnosis Date    Hyperlipidemia     Hypertension     Migraine     Psychiatric disorder      Past Surgical History  Past Surgical History:   Procedure Laterality Date    EXPLORATORY LAPAROTOMY      HAND SURGERY      Hand excision of tendon cyst      10/19/18 1157   Note Type   Note type Eval only   Restrictions/Precautions   Weight Bearing Precautions Per Order No   Other Precautions Telemetry; Fall Risk;Pain   Pain Assessment   Pain Assessment No/denies pain   Pain Score 3   Pain Type Acute pain   Pain Location Head  (Left side of head)   Pain Orientation Behind eye(s)   Pain Radiating Towards tingling down left arm   Home Living   Type of [address] [place] [address] One level  (1STE)   Bathroom Shower/Tub Walk-in shower  (Both walk-in shower and tub)   Bathroom Toilet Standard   Additional Comments Pt reports that she was completely independent PTA  She drives and works as x-ray technician     Prior Function   Level of [place] Independent with ADLs and functional mobility   Lives With Alone   ADL Assistance Independent   IADLs Independent   Falls in the last 6 months 0  (Has to grab the counter at work last few weeks b/c unstable)   Vocational Full time employment   Lifestyle   Intrinsic Gratification Pt works as xray tech    Psychosocial   Psychosocial (WDL) WDL   ADL   Where Assessed Edge of bed   Eating Assistance 7  Independent   Grooming Assistance 7  Independent   UB Bathing Assistance 7  [address]  [address] 7  Independent   LB Dressing Assistance 7  [address]  7  [address] [facility] 7  [address] [facility] to Stand 7  [address] [place] [address] 7  Independent   Balance   Static Sitting Normal   Dynamic Sitting Normal   Static Standing Normal   Dynamic Standing [facility] +   Activity Tolerance   Activity Tolerance Patient tolerated treatment well   RUE Assessment   RUE Assessment WFL   LUE Assessment   LUE Assessment WFL   Hand Function   Gross Motor Coordination Functional   Fine Motor Coordination Functional   Sensation   Light Touch (Pt reported numbness and tingling in her left hand)   Proprioception   Proprioception No apparent deficits   Vision-Basic Assessment   Current Vision No visual deficits   Vision - Complex Assessment   Tracking Able to track stimulus in all quads without difficulty   Perception   Inattention/Neglect Appears intact   Cognition   Overall Cognitive Status [facility]   Arousal/Participation Alert; Responsive; Cooperative   Attention Within functional limits   Orientation Level Oriented X4   Memory Within functional limits   Following Commands Follows all commands and directions without difficulty   Assessment   Prognosis Good   Assessment Pt is a 59year old female seen for OT eval s/p adm to [facility] w/ left sided facial numbness and tingling for 3 days  Comorbidities include a h/o HTN, HLD, anxiety, GERD, migraines and venous insufficiency  Pt with active OT orders and up and OOB as tolerated orders   Pt lives alone in a [facility] with 1STE  Pt was I w/  ADLS and IADLS, drove, & required no use of DME PTA  Pt is currently able to complete her ADLs and functional transfers at her baseline level  Pt scored overall 90/100 on the Barthel Index  Based on aforementioned OT evaluation, functional performance, and assessments, pt has been identified as a moderate complexity evaluation  Recommend home with prior level of assistance upon discharge  OT services d/c at this time  Please re-consult OT if clinically appropriate     Goals   Patient Goals "I just want to be able to walk around without holding on to the counter:   Recommendation   OT Discharge Recommendation Home independent   OT - OK to Discharge Yes  (when medically stable)   Barthel Index   Feeding 10   Bathing 5   Grooming Score 5   Dressing Score 10   Bladder Score 10   Bowels Score 10   Toilet Use Score 10   Transfers (Bed/Chair) Score 15   Mobility (Level Surface) Score 15   Stairs Score 0   Barthel Index Score 90        Perla Ott, MOT, OTR/L

## 2018-10-19 NOTE — PLAN OF CARE
Problem: SAFETY ADULT  Goal: Patient will remain free of falls  INTERVENTIONS:  - Assess patient frequently for physical needs  -  Identify cognitive and physical deficits and behaviors that affect risk of falls    -  [place] fall precautions as indicated by assessment   - Educate patient/family on patient safety including physical limitations  - Instruct patient to call for assistance with activity based on assessment  - Modify environment to reduce risk of injury  - Consider OT/PT consult to assist with strengthening/mobility  Outcome: Progressing

## 2018-10-20 ENCOUNTER — APPOINTMENT (OUTPATIENT)
Dept: RADIOLOGY | Facility: HOSPITAL | Age: 58
End: 2018-10-20
Payer: COMMERCIAL

## 2018-10-20 VITALS
HEART RATE: 65 BPM | DIASTOLIC BLOOD PRESSURE: 58 MMHG | TEMPERATURE: 97.5 F | RESPIRATION RATE: 18 BRPM | HEIGHT: 64 IN | SYSTOLIC BLOOD PRESSURE: 127 MMHG | WEIGHT: 174.8 LBS | BODY MASS INDEX: 29.84 KG/M2 | OXYGEN SATURATION: 95 %

## 2018-10-20 PROBLEM — R29.90 STROKE-LIKE SYMPTOMS: Status: RESOLVED | Noted: 2018-10-19 | Resolved: 2018-10-20

## 2018-10-20 PROBLEM — R07.9 CHEST PAIN: Status: RESOLVED | Noted: 2018-10-19 | Resolved: 2018-10-20

## 2018-10-20 PROBLEM — H53.9 VISUAL DISTURBANCE: Status: ACTIVE | Noted: 2018-10-20

## 2018-10-20 PROBLEM — H53.9 VISUAL DISTURBANCE: Status: RESOLVED | Noted: 2018-10-20 | Resolved: 2018-10-20

## 2018-10-20 PROCEDURE — 99225 PR SBSQ OBSERVATION CARE/DAY 25 MINUTES: CPT | Performed by: PSYCHIATRY & NEUROLOGY

## 2018-10-20 PROCEDURE — 70553 MRI BRAIN STEM W/O & W/DYE: CPT

## 2018-10-20 PROCEDURE — A9585 GADOBUTROL INJECTION: HCPCS | Performed by: INTERNAL MEDICINE

## 2018-10-20 PROCEDURE — 99217 PR OBSERVATION CARE DISCHARGE MANAGEMENT: CPT | Performed by: PHYSICIAN ASSISTANT

## 2018-10-20 RX ORDER — ASPIRIN 81 MG/1
81 TABLET, CHEWABLE ORAL DAILY
Refills: 0
Start: 2018-10-21 | End: 2020-04-09

## 2018-10-20 RX ORDER — PREDNISONE 10 MG/1
TABLET ORAL
Qty: 42 TABLET | Refills: 0 | Status: SHIPPED | OUTPATIENT
Start: 2018-10-20 | End: 2018-11-15

## 2018-10-20 RX ORDER — PANTOPRAZOLE SODIUM 40 MG/1
40 TABLET, DELAYED RELEASE ORAL DAILY
Qty: 30 TABLET | Refills: 0 | Status: SHIPPED | OUTPATIENT
Start: 2018-10-20 | End: 2019-05-13

## 2018-10-20 RX ORDER — KETOROLAC TROMETHAMINE 30 MG/ML
30 INJECTION, SOLUTION INTRAMUSCULAR; INTRAVENOUS EVERY 6 HOURS PRN
Status: DISCONTINUED | OUTPATIENT
Start: 2018-10-22 | End: 2018-10-20 | Stop reason: HOSPADM

## 2018-10-20 RX ADMIN — VALPROATE SODIUM 500 MG: 100 INJECTION, SOLUTION INTRAVENOUS at 09:01

## 2018-10-20 RX ADMIN — FLUTICASONE FUROATE AND VILANTEROL TRIFENATATE 1 PUFF: 200; 25 POWDER RESPIRATORY (INHALATION) at 08:55

## 2018-10-20 RX ADMIN — HYDROCHLOROTHIAZIDE 12.5 MG: 12.5 TABLET ORAL at 08:52

## 2018-10-20 RX ADMIN — GADOBUTROL 8 ML: 604.72 INJECTION INTRAVENOUS at 12:15

## 2018-10-20 RX ADMIN — MAGNESIUM SULFATE HEPTAHYDRATE 2 G: 40 INJECTION, SOLUTION INTRAVENOUS at 09:57

## 2018-10-20 RX ADMIN — CELECOXIB 100 MG: 100 CAPSULE ORAL at 08:56

## 2018-10-20 RX ADMIN — PANTOPRAZOLE SODIUM 40 MG: 40 TABLET, DELAYED RELEASE ORAL at 05:32

## 2018-10-20 RX ADMIN — FLUTICASONE PROPIONATE 1 SPRAY: 50 SPRAY, METERED NASAL at 08:55

## 2018-10-20 RX ADMIN — METOCLOPRAMIDE 10 MG: 5 INJECTION, SOLUTION INTRAMUSCULAR; INTRAVENOUS at 05:31

## 2018-10-20 RX ADMIN — BUTALBITAL, ACETAMINOPHEN, AND CAFFEINE 1 TABLET: 50; 325; 40 TABLET ORAL at 13:38

## 2018-10-20 RX ADMIN — METOCLOPRAMIDE 10 MG: 5 INJECTION, SOLUTION INTRAMUSCULAR; INTRAVENOUS at 13:33

## 2018-10-20 RX ADMIN — ENOXAPARIN SODIUM 40 MG: 40 INJECTION SUBCUTANEOUS at 08:53

## 2018-10-20 RX ADMIN — ASPIRIN 81 MG 81 MG: 81 TABLET ORAL at 08:52

## 2018-10-20 RX ADMIN — LOSARTAN POTASSIUM 100 MG: 50 TABLET ORAL at 08:52

## 2018-10-20 RX ADMIN — METOPROLOL SUCCINATE 100 MG: 100 TABLET, EXTENDED RELEASE ORAL at 08:52

## 2018-10-20 RX ADMIN — Medication 1000 MG: at 08:51

## 2018-10-20 NOTE — ASSESSMENT & PLAN NOTE
· Left facial droop, left sided numbness, visual disturbance, balance issues  · Evaluated by neurology   [place] to be due to complicated migraine  · Stroke workup negative  · Continue aspirin  · Statin allergy noted

## 2018-10-20 NOTE — UTILIZATION REVIEW
OBSERVATION STATUS 10/19/18 @ 0211     Continued Stay Review    Date: 10/20/18    Age/Sex: 59 y o  female     ASSESSMENT/PLAN:   59 y o  female with a history of migraine, hypertension, hyperlipidemia who presented to the ED on 10/18/2018 with left facial droop, left-sided numbness radiating to the left upper extremity as well as intermittent visual disturbance, speech disturbance and balance issues        Plan:  Left facial droop, left sided numbness, visual disturbance, balance issues   Acute ischemic stroke protocol  Telemetry  PT/OT/ST  Stroke Education  Frequent neurological checks  Stat CT head with acute decline of in exam/mental status  Notify neurology with any changes in examination       Complicated migraine  Patient complaining of 8/10 left sided headache  Due to patient having a loud roommate, she is not sure if she wants to stay  I did offer to see if we could obtain a private room  I also recommended having her take a dose of Ketoralac now, which patient is agreeable to  Patient would like to lay down for a while and then after the Ketoralac see how she feels  At that time she will determine if she wants to stay to continue to try to treat her migraine or if she would like to go home  Ideally I would like her head pain better controlled prior to discharge as I would like to minimize the chance of her presenting symptoms returning     Depacon 500mg q 8 hours - s/p 3 doses  Toradol - patient has not taken any yet  Reglan 10mg - s/p 5 doses  Magnesium 2g daily  Fioricet - s/p 1 dose      Vital Signs: /56   Pulse 64   Temp 98 3 °F (36 8 °C)   Resp 18   Ht 5' 4" (1 626 m)   Wt 79 3 kg (174 lb 12 8 oz)   LMP  (LMP Unknown)   SpO2 98%   BMI 30 00 kg/m²     Medications:   Scheduled Meds:   Current Facility-Administered Medications:  acetaminophen 650 mg Oral Q6H PRN   aspirin 81 mg Oral Daily   butalbital-acetaminophen-caffeine 1 tablet Oral Q6H PRN   celecoxib 100 mg Oral Daily   enoxaparin 40 mg Subcutaneous Daily   fish oil 1,000 mg Oral Daily   fluticasone 1 spray Nasal BID   fluticasone-vilanterol 1 puff Inhalation Daily   hydrochlorothiazide 12 5 mg Oral Daily   [START ON 10/22/2018] ketorolac 30 mg Intravenous Q6H PRN   LORazepam 0 5 mg Oral Q6H PRN   losartan 100 mg Oral Daily   magnesium sulfate 2 g Intravenous After Breakfast   metoclopramide 10 mg Intravenous Q6H [address]   metoprolol succinate 100 mg Oral Daily   pantoprazole 40 mg Oral Early Morning       Abnormal Labs/Diagnostic Results:   CT head negative for acute abnormality  CTA of head and neck negative for large vessel occlusion or flow limiting stenosis  MRI brain without contrast completed  No acute abnormality or acute ischemia on my read, though formal read pending  Echo: EF60%, No RWMA, findings c/w grade 2 diastolic dysfunction  No atrial dilation      Lipid panel: total 283, triglycerides 152, HDL 59,   HbA1C 5 4  TSH WNL  CRP 7 2  ESR 10  Troponin <0 02      Discharge Plan: home when medically cleared

## 2018-10-20 NOTE — DISCHARGE SUMMARY
Discharge- Wenceslao Gaona 1960, 59 y o  female MRN: [id number]    Unit/Bed#: CW2 214-01 Encounter: [id number]    Primary Care Provider: Johnson Wilson MD   Date and time admitted to hospital: 10/18/2018 11:15 PM        * Stroke-like symptomsresolved as of 10/20/2018   Assessment & Plan    · Left facial droop, left sided numbness, visual disturbance, balance issues  · Evaluated by neurology  [place] to be due to complicated migraine  · Stroke workup negative  · Continue aspirin  · Statin allergy noted     Migraines   Assessment & Plan    · Neurology recommending twelve day steroid taper- asked to avoid NSAIDs while on steroids  · F/U with neurology       Anxiety   Assessment & Plan    - Continue ativan 0 5 mg qid prn     Benign essential hypertension   Assessment & Plan    · Resume home meds on discharge     Visual disturbanceresolved as of 10/20/2018   Assessment & Plan    · Possibly related to migraine  · Recommend ophthalmology evaluation upon discharge     Chest painresolved as of 10/20/2018   Assessment & Plan    · Troponins negative  · EKG without ischemic change  · Possible GERD- PPI initiated         Discharging Physician / Practitioner: Mary Egan PA-C  PCP: Johnson Wilson MD  Admission Date:   Admission Orders     Ordered        10/19/18 0210  Place in Observation (expected length of stay for this patient is less than two midnights)  Once             Discharge Date: 10/20/18    Resolved Problems  Date Reviewed: 10/20/2018          Resolved    Bilateral amaurosis fugax 10/20/2018     Resolved by  Mary Egan PA-C    * (Principal)Stroke-like symptoms 10/20/2018     Resolved by  Mary Egan PA-C    Chest pain 10/20/2018     Resolved by  Mary Egan PA-C    Visual disturbance 10/20/2018     Resolved by  Mary Egan PA-C          Consultations During Hospital Stay:  · Neurology    Procedures Performed:     · None    Significant Findings / Test Results: · CT head: no acute abnormality  · Chest x-ray: No acute abnormality  · CTA head and neck: Unremarkable  No abnormality, significant stenosis, or aneurysm  · Echo: EF 60%  Grade 2 diastolic dysfunction  · MRI brain: official read is pending at time of discharge but per my discussion with neurology, Dr Jody Kawasaki, there is no evidence of stroke  Incidental Findings:   · None     Test Results Pending at Discharge (will require follow up):   · MRI     Outpatient Tests Requested:  · None    Complications:  None    Reason for Admission: Stroke-like symptoms    Hospital Course:     Preethi Barboza is a 59 y o  female patient with a history of anxiety, migraines, HTN, and HLD who originally presented to the hospital on 10/18/2018 due to left sided facial numbness and LUE numbness  She was noted to have slurred speech and felt off balance when ambulating  She also described a headache associated with left eye blurry vision, diplopia, and photophobia  She reported transient episode of left eye vision "blacking out " She was admitted under the stroke pathway and seen by neurology  Symptoms resolved  Ischemic workup was negative and it was felt patient's presentation was consistent with complicated migraine  She will follow-up with neurology  It was recommended she be discharged with twelve day steroid taper  She also reported chest pain and cardiac workup was negative  Pain was felt to be related to GERD and she was started on PPI  Please see above list of diagnoses and related plan for additional information  Condition at Discharge: stable     Discharge Day Visit / Exam:     Subjective:  She states sometimes her left eye feels "weird" as if it is numb but she has no visual change at this time  Left sided facial and arm numbness is resolved  No further chest pain    Vitals: Blood Pressure: 127/58 (10/20/18 1514)  Pulse: 65 (10/20/18 1514)  Temperature: 97 5 °F (36 4 °C) (10/20/18 1514)  Temp Source: Oral (10/20/18 1514)  Respirations: 18 (10/20/18 1514)  Height: 5' 4" (162 6 cm) (10/19/18 0300)  Weight - Scale: 79 3 kg (174 lb 12 8 oz) (10/19/18 0300)  SpO2: 95 % (10/20/18 1514)  Exam:   Physical Exam   Constitutional: She is oriented to person, place, and time  No distress  HENT:   Head: Normocephalic and atraumatic  Eyes: Pupils are equal, round, and reactive to light  EOM are normal  No scleral icterus  Neck: Neck supple  Cardiovascular: Normal rate, regular rhythm and normal heart sounds  Pulmonary/Chest: Effort normal and breath sounds normal  No respiratory distress  She has no wheezes  She has no rales  Abdominal: Soft  Bowel sounds are normal    Musculoskeletal: She exhibits no edema  Neurological: She is alert and oriented to person, place, and time  Normal strength in all extremities   Skin: Skin is warm and dry  She is not diaphoretic  Psychiatric: She has a normal mood and affect  Discharge instructions/Information to patient and family:   See after visit summary for information provided to patient and family  Provisions for Follow-Up Care:  See after visit summary for information related to follow-up care and any pertinent home health orders  Disposition:     Home    For Discharges to [facility]:   · Not Applicable to this Patient - Not Applicable to this Patient    Planned Readmission: None     Discharge Statement:  I spent 45 minutes discharging the patient  This time was spent on the day of discharge  I had direct contact with the patient on the day of discharge  Greater than 50% of the total time was spent examining patient, answering all patient questions, arranging and discussing plan of care with patient as well as directly providing post-discharge instructions  Additional time then spent on discharge activities  Discharge Medications:  See after visit summary for reconciled discharge medications provided to patient and family        ** Please Note: This note has been constructed using a voice recognition system **

## 2018-10-20 NOTE — PROGRESS NOTES
Progress Note - Neurology   Kyara Clements 59 y o  female [id number]  Unit/Bed#: CW2 214/CW2 214-01    Assessment:  Kyara Clements is a 59 y o  female with a history of migraine, hypertension, hyperlipidemia who presented to the ED on 10/18/2018 with left facial droop, left-sided numbness radiating to the left upper extremity as well as intermittent visual disturbance, speech disturbance and balance issues       Plan:  Left facial droop, left sided numbness, visual disturbance, balance issues  No evidence of acute ischemia on imaging  Symptoms more likely to be secondary to complicated migraine  Today, patient states that above symptoms have resolved  Acute ischemic stroke protocol  Continue Aspirin 81mg  Patient not on statin because of atorvastatin causing an allergy of "throat closing up " On Fish Oil  CT head negative for acute abnormality  CTA of head and neck negative for large vessel occlusion or flow limiting stenosis  MRI brain without contrast completed  No acute abnormality or acute ischemia on my read, though formal read pending  Echo: EF60%, No RWMA, findings c/w grade 2 diastolic dysfunction  No atrial dilation  Telemetry  Lipid panel: total 283, triglycerides 152, HDL 59,   HbA1C 5 4  TSH WNL  CRP 7 2  ESR 10  Troponin <0 02  Secondary risk factor modification  Goal of normotension  Hyperglycemia control   PT/OT/ST  Stroke Education  Frequent neurological checks  Stat CT head with acute decline of in exam/mental status  Notify neurology with any changes in examination  Complicated migraine  Patient complaining of 8/10 left sided headache  Due to patient having a loud roommate, she is not sure if she wants to stay  I did offer to see if we could obtain a private room  I also recommended having her take a dose of Ketoralac now, which patient is agreeable to  Patient would like to lay down for a while and then after the Ketoralac see how she feels   At that time she will determine if she wants to stay to continue to try to treat her migraine or if she would like to go home  Ideally I would like her head pain better controlled prior to discharge as I would like to minimize the chance of her presenting symptoms returning  Depacon 500mg q 8 hours - s/p 3 doses  Toradol - patient has not taken any yet  Reglan 10mg - s/p 5 doses  Magnesium 2g daily  Fioricet - s/p 1 dose  The patient should follow-up with outpatient neurology regarding her headaches  Communication has been sent to the office to schedule a follow-up appointment  Subjective:   VSS  Patient complaining of 8/10 left-sided headache  Previous symptoms of left sided numbness, visual disturbance balance issue have resolved  I did get a chance to look at the patient's 's license and the asymmetry of her left nasolabial fold as well as her left eye appears to be chronic  Of note when she smiles this asymmetry resolved  The patient did disclose to me that her current roommate is very loud and this is making it difficult for her in the setting of her headache      Past Medical History:   Diagnosis Date    Hyperlipidemia     Hypertension     Migraine     Psychiatric disorder      Past Surgical History:   Procedure Laterality Date    EXPLORATORY LAPAROTOMY      HAND SURGERY      Hand excision of tendon cyst     Family history:  Family History   Problem Relation Age of Onset    Lung cancer Mother     Diabetes Father     Diabetes Other        Social History     Social History    Marital status:      Spouse name: N/A    Number of children: N/A    Years of education: N/A     Social History Main Topics    Smoking status: Former Smoker     Packs/day: 0 50     Years: 10 00    Smokeless tobacco: Never Used    Alcohol use No    Drug use: No    Sexual activity: Not Asked     Other Topics Concern    None     Social History Narrative    None       Scheduled Meds:  Current Facility-Administered Medications:  acetaminophen 650 mg Oral Q6H PRN Titus Fishman, PA-C   aspirin 81 mg Oral Daily Titus Kye, PA-C   butalbital-acetaminophen-caffeine 1 tablet Oral Q6H PRN Titus Fishman, PA-C   celecoxib 100 mg Oral Daily Titus Kye, PA-C   enoxaparin 40 mg Subcutaneous Daily Titus Kye, PA-C   fish oil 1,000 mg Oral Daily Jw Trisha, PA-C   fluticasone 1 spray Nasal BID Titus Kye, PA-C   fluticasone-vilanterol 1 puff Inhalation Daily Titus Kye, PA-C   hydrochlorothiazide 12 5 mg Oral Daily Titus Kye, PA-REBECCA   ketorolac 30 mg Intravenous Q6H PRN [facility], CRNP   LORazepam 0 5 mg Oral Q6H PRN Titus Fishman, PA-REBECCA   losartan 100 mg Oral Daily Titus Kye, PA-C   magnesium sulfate 2 g Intravenous After Breakfast [facility], CRNP   metoclopramide 10 mg Intravenous Q6H [facility], JUD   metoprolol succinate 100 mg Oral Daily Titus Kye, NANNETTE   pantoprazole 40 mg Oral Early Morning Titus Fishman, PA-C     Continuous Infusions:   PRN Meds:   acetaminophen    butalbital-acetaminophen-caffeine    ketorolac    LORazepam    ROS: A 12 point ROS was completed and other than the above mentioned complaints in the HPI, all remaining systems were negative  Vitals: /82 (BP Location: Right arm)   Pulse 70   Temp 98 1 °F (36 7 °C) (Oral)   Resp 16   Ht 5' 4" (1 626 m)   Wt 79 3 kg (174 lb 12 8 oz)   LMP  (LMP Unknown)   SpO2 94%   BMI 30 00 kg/m²     Physical Exam:   Physical Exam   Constitutional: She is oriented to person, place, and time  She appears well-developed and well-nourished  No distress  Seated on the edge of bed   HENT:   Head: Normocephalic and atraumatic  Eyes: Pupils are equal, round, and reactive to light  Conjunctivae and EOM are normal  Right eye exhibits no discharge  Left eye exhibits no discharge  No scleral icterus  Neck: Normal range of motion  Neck supple  Cardiovascular: Normal rate and regular rhythm    Exam reveals no gallop and no friction rub  No murmur heard  Pulmonary/Chest: Effort normal and breath sounds normal  No stridor  No respiratory distress  She has no wheezes  She has no rales  She exhibits no tenderness  Musculoskeletal: Normal range of motion  She exhibits no edema, tenderness or deformity  Lymphadenopathy:     She has no cervical adenopathy  Neurological: She is alert and oriented to person, place, and time  She has normal strength  She has a normal Finger-Nose-Finger Test    Skin: Skin is warm and dry  No rash noted  No erythema  Psychiatric:   Flat affect, pleasant     Neurologic Exam     Mental Status   Oriented to person, place, and time  Alert and oriented x3  Follows multi step commands without difficulty  He is able to maintain attention throughout exam   Speech is normal without evidence of dysarthria or aphasia  Cranial Nerves     CN II   Visual acuity: normal (Grossly)  Right visual field deficit: none  Left visual field deficit: none     CN III, IV, VI   Pupils are equal, round, and reactive to light  Extraocular motions are normal    Nystagmus: none   Diplopia: none  Conjugate gaze: present    CN V   Facial sensation intact  CN XII   CN XII normal    At rest, left facial droop is noted especially at the eye and a decreased left nasolabial fold  After looking a 's license picture this appears to be chronic  Asymmetry minimizes with smile  Motor Exam   Muscle bulk: normal  Overall muscle tone: normal  Right arm pronator drift: absent  Left arm pronator drift: absent    Strength   Strength 5/5 throughout  Sensory Exam   Light touch normal      Gait, Coordination, and Reflexes     Coordination   Finger to nose coordination: normal      Labs:  No results found for this or any previous visit (from the past 24 hour(s))  Imaging: I have personally reviewed pertinent imaging and PACS reports       VTE Prophylaxis: Enoxaparin (Lovenox)

## 2018-10-20 NOTE — DISCHARGE INSTRUCTIONS
Please do not take ibuprofen (Motrin) or celecoxib (Celebrex) or other NSAIDs while taking prednisone as this can bother your stomach and cause an ulcer

## 2018-10-22 ENCOUNTER — TRANSITIONAL CARE MANAGEMENT (OUTPATIENT)
Dept: FAMILY MEDICINE CLINIC | Facility: CLINIC | Age: 58
End: 2018-10-22

## 2018-10-22 ENCOUNTER — TELEPHONE (OUTPATIENT)
Dept: NEUROLOGY | Facility: CLINIC | Age: 58
End: 2018-10-22

## 2018-10-22 NOTE — TELEPHONE ENCOUNTER
----- Message from Marysol Amaya, [address] sent at 10/19/2018  4:29 PM EDT -----  Regarding: HFU    Diagnosis/Reason for follow-up: migraine, ocular migraine  Subspecialty for follow-up: headache clinic  Existing neurologist: [facility]    Additional notes: patient would like to follow with [facility] for further management  She needs follow up in 4-6 weeks  Thank You so much!

## 2018-10-25 ENCOUNTER — HOSPITAL ENCOUNTER (EMERGENCY)
Facility: HOSPITAL | Age: 58
Discharge: HOME | End: 2018-10-25
Attending: EMERGENCY MEDICINE
Payer: COMMERCIAL

## 2018-10-25 VITALS
BODY MASS INDEX: 29.18 KG/M2 | SYSTOLIC BLOOD PRESSURE: 166 MMHG | DIASTOLIC BLOOD PRESSURE: 75 MMHG | TEMPERATURE: 97.9 F | OXYGEN SATURATION: 97 % | RESPIRATION RATE: 20 BRPM | WEIGHT: 170 LBS | HEART RATE: 56 BPM

## 2018-10-25 DIAGNOSIS — F41.9 ANXIETY: ICD-10-CM

## 2018-10-25 DIAGNOSIS — G43.409: Primary | ICD-10-CM

## 2018-10-25 LAB
ALBUMIN SERPL BCP-MCNC: 4.3 G/DL (ref 3.5–5)
ALP SERPL-CCNC: 85 U/L (ref 46–116)
ALT SERPL W P-5'-P-CCNC: 21 U/L (ref 12–78)
ANION GAP SERPL CALCULATED.3IONS-SCNC: 10 MMOL/L (ref 4–13)
AST SERPL W P-5'-P-CCNC: 12 U/L (ref 5–45)
ATRIAL RATE: 65 BPM
BASOPHILS # BLD AUTO: 0.08 THOUSANDS/ΜL (ref 0–0.1)
BASOPHILS NFR BLD AUTO: 1 % (ref 0–1)
BILIRUB SERPL-MCNC: 0.23 MG/DL (ref 0.2–1)
BUN SERPL-MCNC: 17 MG/DL (ref 5–25)
CALCIUM SERPL-MCNC: 9.3 MG/DL (ref 8.3–10.1)
CHLORIDE SERPL-SCNC: 102 MMOL/L (ref 100–108)
CO2 SERPL-SCNC: 27 MMOL/L (ref 21–32)
CREAT SERPL-MCNC: 0.89 MG/DL (ref 0.6–1.3)
EOSINOPHIL # BLD AUTO: 0.3 THOUSAND/ΜL (ref 0–0.61)
EOSINOPHIL NFR BLD AUTO: 5 % (ref 0–6)
ERYTHROCYTE [DISTWIDTH] IN BLOOD BY AUTOMATED COUNT: 11.9 % (ref 11.6–15.1)
GFR SERPL CREATININE-BSD FRML MDRD: 71 ML/MIN/1.73SQ M
GLUCOSE SERPL-MCNC: 95 MG/DL (ref 65–140)
HCT VFR BLD AUTO: 37.6 % (ref 34.8–46.1)
HGB BLD-MCNC: 12.8 G/DL (ref 11.5–15.4)
IMM GRANULOCYTES # BLD AUTO: 0.03 THOUSAND/UL (ref 0–0.2)
IMM GRANULOCYTES NFR BLD AUTO: 1 % (ref 0–2)
LYMPHOCYTES # BLD AUTO: 1.58 THOUSANDS/ΜL (ref 0.6–4.47)
LYMPHOCYTES NFR BLD AUTO: 26 % (ref 14–44)
MCH RBC QN AUTO: 31.6 PG (ref 26.8–34.3)
MCHC RBC AUTO-ENTMCNC: 34 G/DL (ref 31.4–37.4)
MCV RBC AUTO: 93 FL (ref 82–98)
MONOCYTES # BLD AUTO: 0.41 THOUSAND/ΜL (ref 0.17–1.22)
MONOCYTES NFR BLD AUTO: 7 % (ref 4–12)
NEUTROPHILS # BLD AUTO: 3.71 THOUSANDS/ΜL (ref 1.85–7.62)
NEUTS SEG NFR BLD AUTO: 60 % (ref 43–75)
NRBC BLD AUTO-RTO: 0 /100 WBCS
P AXIS: -23 DEGREES
PLATELET # BLD AUTO: 237 THOUSANDS/UL (ref 149–390)
PMV BLD AUTO: 9.6 FL (ref 8.9–12.7)
POTASSIUM SERPL-SCNC: 4.1 MMOL/L (ref 3.5–5.3)
PR INTERVAL: 150 MS
PROT SERPL-MCNC: 7.6 G/DL (ref 6.4–8.2)
QRS AXIS: -31 DEGREES
QRSD INTERVAL: 84 MS
QT INTERVAL: 396 MS
QTC INTERVAL: 411 MS
RBC # BLD AUTO: 4.05 MILLION/UL (ref 3.81–5.12)
SODIUM SERPL-SCNC: 139 MMOL/L (ref 136–145)
T WAVE AXIS: 7 DEGREES
VENTRICULAR RATE: 65 BPM
WBC # BLD AUTO: 6.11 THOUSAND/UL (ref 4.31–10.16)

## 2018-10-25 PROCEDURE — 36415 COLL VENOUS BLD VENIPUNCTURE: CPT | Performed by: EMERGENCY MEDICINE

## 2018-10-25 PROCEDURE — 99284 EMERGENCY DEPT VISIT MOD MDM: CPT

## 2018-10-25 PROCEDURE — 85025 COMPLETE CBC W/AUTO DIFF WBC: CPT | Performed by: EMERGENCY MEDICINE

## 2018-10-25 PROCEDURE — 96361 HYDRATE IV INFUSION ADD-ON: CPT

## 2018-10-25 PROCEDURE — 93010 ELECTROCARDIOGRAM REPORT: CPT | Performed by: INTERNAL MEDICINE

## 2018-10-25 PROCEDURE — 80053 COMPREHEN METABOLIC PANEL: CPT | Performed by: EMERGENCY MEDICINE

## 2018-10-25 PROCEDURE — 96374 THER/PROPH/DIAG INJ IV PUSH: CPT

## 2018-10-25 PROCEDURE — 93005 ELECTROCARDIOGRAM TRACING: CPT

## 2018-10-25 PROCEDURE — 96375 TX/PRO/DX INJ NEW DRUG ADDON: CPT

## 2018-10-25 RX ORDER — KETOROLAC TROMETHAMINE 30 MG/ML
15 INJECTION, SOLUTION INTRAMUSCULAR; INTRAVENOUS ONCE
Status: COMPLETED | OUTPATIENT
Start: 2018-10-25 | End: 2018-10-25

## 2018-10-25 RX ORDER — METOCLOPRAMIDE HYDROCHLORIDE 5 MG/ML
10 INJECTION INTRAMUSCULAR; INTRAVENOUS ONCE
Status: COMPLETED | OUTPATIENT
Start: 2018-10-25 | End: 2018-10-25

## 2018-10-25 RX ADMIN — SODIUM CHLORIDE 1000 ML: 0.9 INJECTION, SOLUTION INTRAVENOUS at 13:27

## 2018-10-25 RX ADMIN — METOCLOPRAMIDE 10 MG: 5 INJECTION, SOLUTION INTRAMUSCULAR; INTRAVENOUS at 13:29

## 2018-10-25 RX ADMIN — KETOROLAC TROMETHAMINE 15 MG: 30 INJECTION, SOLUTION INTRAMUSCULAR at 13:28

## 2018-10-25 NOTE — ED PROVIDER NOTES
History  Chief Complaint   Patient presents with    Facial Numbness     Pt c/o of left sided facial numness that started at 0500  Pt also c/o left arm tingliness  No facial drop and  are equal  Pt also c/o headache  Pt seen [facility] ED on thursday with same symptoms and diagnosed with migraine  77-year-old female presents for evaluation of recurrent left-sided facial numbness that awoke the patient at 5:00 a m     The patient states the symptoms are similar to previous symptoms which prompted her admission at [facility] this past weekend  A full evaluation was done the patient states there was no evidence of stroke  Told the patient that she had some form of headache  Patient states that she took her Fioricet without improvement  She has associated visual disturbance with the left-sided facial numbness with left-sided upper extremity and lower extremity decreased sensation  The patient also feels like her tongue is asleep  Prior to Admission Medications   Prescriptions Last Dose Informant Patient Reported? Taking?    LORazepam (ATIVAN) 0 5 mg tablet   Yes No   Sig: Take 0 5 mg by mouth every 6 (six) hours as needed     MULTIPLE VITAMIN PO   Yes No   Sig: Take by mouth   aspirin 81 mg chewable tablet   No No   Sig: Chew 1 tablet (81 mg total) daily   butalbital-acetaminophen-caffeine (FIORICET,ESGIC) -40 mg per tablet   Yes No   Sig: Take 1 tablet by mouth   celecoxib (CeleBREX) 100 mg capsule  Self Yes No   Sig: Take 100 mg by mouth daily   fluticasone (FLONASE) 50 mcg/act nasal spray   Yes No   Si spray into each nostril 2 (two) times a day   fluticasone-vilanterol (BREO ELLIPTA) 200-25 MCG/INH inhaler   Yes No   Sig: Inhale 1 puff daily Rinse mouth after use    hydrochlorothiazide (HYDRODIURIL) 12 5 mg tablet   No No   Sig: Take 1 tablet (12 5 mg total) by mouth daily   ibuprofen (MOTRIN) 800 mg tablet   Yes No   Sig: Take by mouth 2 (two) times a day   metoprolol succinate (TOPROL-XL) 50 mg 24 hr tablet   No No   Sig: Take 2 tablets (100 mg total) by mouth daily   pantoprazole (PROTONIX) 40 mg tablet   No No   Sig: Take 1 tablet (40 mg total) by mouth daily   predniSONE 10 mg tablet   No No   Sig: Take 6 tabs x 2 days, then take 5 tabs x 2 days, then 4 tabs x 2 days, then 3 tabs x 2 days, then 2 tabs x 2 days, then 1 tab x 2 days, then stop   valsartan (DIOVAN) 160 mg tablet   No No   Sig: Take 1 tablet (160 mg total) by mouth daily      Facility-Administered Medications: None       Past Medical History:   Diagnosis Date    Hyperlipidemia     Hypertension     Migraine     Psychiatric disorder        Past Surgical History:   Procedure Laterality Date    EXPLORATORY LAPAROTOMY      HAND SURGERY      Hand excision of tendon cyst       Family History   Problem Relation Age of Onset    Lung cancer Mother     Diabetes Father     Diabetes Other      I have reviewed and agree with the history as documented  Social History   Substance Use Topics    Smoking status: Former Smoker     Packs/day: 0 50     Years: 10 00    Smokeless tobacco: Never Used    Alcohol use No        Review of Systems   Neurological: Positive for facial asymmetry, speech difficulty, numbness and headaches  Psychiatric/Behavioral: The patient is nervous/anxious  All other systems reviewed and are negative  Physical Exam  Physical Exam   Constitutional: She is oriented to person, place, and time  She appears well-developed and well-nourished  No distress  HENT:   Head: Normocephalic and atraumatic  Right Ear: External ear normal    Left Ear: External ear normal    Eyes: Pupils are equal, round, and reactive to light  Conjunctivae and EOM are normal  No scleral icterus  Neck: Normal range of motion  Cardiovascular: Normal rate, regular rhythm and normal heart sounds  Pulmonary/Chest: Effort normal and breath sounds normal  No respiratory distress  Abdominal: Soft   Bowel sounds are normal  There is no tenderness  There is no rebound and no guarding  Musculoskeletal: Normal range of motion  She exhibits no edema  Neurological: She is alert and oriented to person, place, and time  A sensory deficit ( subjective left sided) is present  Skin: Skin is warm and dry  No rash noted  Psychiatric: She has a normal mood and affect  Nursing note and vitals reviewed        Vital Signs  ED Triage Vitals   Temperature Pulse Respirations Blood Pressure SpO2   10/25/18 1249 10/25/18 1249 10/25/18 1249 10/25/18 1315 10/25/18 1250   97 9 °F (36 6 °C) 65 16 (!) 176/79 98 %      Temp Source Heart Rate Source Patient Position - Orthostatic VS BP Location FiO2 (%)   10/25/18 1249 10/25/18 1249 10/25/18 1315 10/25/18 1315 --   Oral Monitor Lying Right arm       Pain Score       10/25/18 1250       7           Vitals:    10/25/18 1249 10/25/18 1315 10/25/18 1415 10/25/18 1536   BP:  (!) 176/79 136/62 166/75   Pulse: 65 64 66 56   Patient Position - Orthostatic VS:  Lying Lying Sitting       Visual Acuity      ED Medications  Medications   ketorolac (TORADOL) injection 15 mg (15 mg Intravenous Given 10/25/18 1328)   metoclopramide (REGLAN) injection 10 mg (10 mg Intravenous Given 10/25/18 1329)   sodium chloride 0 9 % bolus 1,000 mL (0 mL Intravenous Stopped 10/25/18 1446)       Diagnostic Studies  Results Reviewed     Procedure Component Value Units Date/Time    Comprehensive metabolic panel [[id number]] Collected:  10/25/18 1326    Lab Status:  Final result Specimen:  Blood from Arm, Left Updated:  10/25/18 1404     Sodium 139 mmol/L      Potassium 4 1 mmol/L      Chloride 102 mmol/L      CO2 27 mmol/L      ANION GAP 10 mmol/L      BUN 17 mg/dL      Creatinine 0 89 mg/dL      Glucose 95 mg/dL      Calcium 9 3 mg/dL      AST 12 U/L      ALT 21 U/L      Alkaline Phosphatase 85 U/L      Total Protein 7 6 g/dL      Albumin 4 3 g/dL      Total Bilirubin 0 23 mg/dL      eGFR 71 ml/min/1 73sq m     Narrative: National Kidney Disease Education Program recommendations are as follows:  GFR calculation is accurate only with a steady state creatinine  Chronic Kidney disease less than 60 ml/min/1 73 sq  meters  Kidney failure less than 15 ml/min/1 73 sq  meters  CBC and differential [[id number]] Collected:  10/25/18 1326    Lab Status:  Final result Specimen:  Blood from Arm, Left Updated:  10/25/18 1337     WBC 6 11 Thousand/uL      RBC 4 05 Million/uL      Hemoglobin 12 8 g/dL      Hematocrit 37 6 %      MCV 93 fL      MCH 31 6 pg      MCHC 34 0 g/dL      RDW 11 9 %      MPV 9 6 fL      Platelets 118 Thousands/uL      nRBC 0 /100 WBCs      Neutrophils Relative 60 %      Immat GRANS % 1 %      Lymphocytes Relative 26 %      Monocytes Relative 7 %      Eosinophils Relative 5 %      Basophils Relative 1 %      Neutrophils Absolute 3 71 Thousands/µL      Immature Grans Absolute 0 03 Thousand/uL      Lymphocytes Absolute 1 58 Thousands/µL      Monocytes Absolute 0 41 Thousand/µL      Eosinophils Absolute 0 30 Thousand/µL      Basophils Absolute 0 08 Thousands/µL                  No orders to display              Procedures  Procedures       Phone Contacts  ED Phone Contact    ED Course  ED Course as of Oct 26 1616   Thu Oct 25, 2018   1515 Patient improved with resolution of pain upon re-evaluation  I discussed the diagnosis of complex/hemiplegic migraine with the patient  I discussed the plan for discharge and follow up with Neurology  I also discussed the use of riboflavin to help prevent the headaches as the patient did not tolerate taking a prednisone taper  The patient was also advised to establish with a mental health professional to discuss her anxiety                                  MDM  Number of Diagnoses or Management Options  Anxiety: new and requires workup  Headache, hemiplegic migraine: new and requires workup  Diagnosis management comments: Differential diagnosis:  Hemiplegic migraine, anxiety, doubt stroke    The patient (and any family present) verbalized understanding of the discharge instructions and warnings that would necessitate return to the Emergency Department  All questions were answered prior to discharge  Amount and/or Complexity of Data Reviewed  Clinical lab tests: ordered and reviewed  Review and summarize past medical records: yes      CritCare Time    Disposition  Final diagnoses:   Headache, hemiplegic migraine   Anxiety     Time reflects when diagnosis was documented in both MDM as applicable and the Disposition within this note     Time User Action Codes Description Comment    10/25/2018  3:18 PM Daisy Reina Add [G43 409] Headache, hemiplegic migraine     10/25/2018  3:18 PM Daisy Reina Add [F41 9] Anxiety       ED Disposition     ED Disposition Condition Comment    Discharge  Shamika Lass discharge to home/self care      Condition at discharge: Stable        Follow-up Information     Follow up With Specialties Details Why Contact Info Additional Information    Erica Alvarez MD Family Medicine Schedule an appointment as soon as possible for a visit For further evaluation [address]  [address] [facility] Schedule an appointment as soon as possible for a visit For further evaluation [address] [facility] [phone]  [address] [place] [address], [phone]    Candance Jakes Neurology [facility] Neurology Schedule an appointment as soon as possible for a visit For further evaluation Amilcar Solis [phone]  [address] [place] [address], [address] [facility], [place], [place], [phone]          Discharge Medication List as of 10/25/2018  3:26 PM      CONTINUE these medications which have NOT CHANGED    Details   aspirin 81 mg chewable tablet Chew 1 tablet (81 mg total) daily, Starting Sun 10/21/2018, No Print      butalbital-acetaminophen-caffeine (FIORICET,ESGIC) -40 mg per tablet Take 1 tablet by mouth, Historical Med      celecoxib (CeleBREX) 100 mg capsule Take 100 mg by mouth daily, Historical Med      fluticasone (FLONASE) 50 mcg/act nasal spray 1 spray into each nostril 2 (two) times a day, Starting Fri 7/14/2017, Historical Med      fluticasone-vilanterol (BREO ELLIPTA) 200-25 MCG/INH inhaler Inhale 1 puff daily Rinse mouth after use , Historical Med      hydrochlorothiazide (HYDRODIURIL) 12 5 mg tablet Take 1 tablet (12 5 mg total) by mouth daily, Starting Tue 8/21/2018, Normal      ibuprofen (MOTRIN) 800 mg tablet Take by mouth 2 (two) times a day, Historical Med      LORazepam (ATIVAN) 0 5 mg tablet Take 0 5 mg by mouth every 6 (six) hours as needed  , Historical Med      metoprolol succinate (TOPROL-XL) 50 mg 24 hr tablet Take 2 tablets (100 mg total) by mouth daily, Starting Wed 9/19/2018, Normal      MULTIPLE VITAMIN PO Take by mouth, Historical Med      pantoprazole (PROTONIX) 40 mg tablet Take 1 tablet (40 mg total) by mouth daily, Starting Sat 10/20/2018, Print      predniSONE 10 mg tablet Take 6 tabs x 2 days, then take 5 tabs x 2 days, then 4 tabs x 2 days, then 3 tabs x 2 days, then 2 tabs x 2 days, then 1 tab x 2 days, then stop, Print      valsartan (DIOVAN) 160 mg tablet Take 1 tablet (160 mg total) by mouth daily, Starting Fri 8/10/2018, Normal           No discharge procedures on file      ED Provider  Electronically Signed by           Paz Lala DO  10/26/18 0422

## 2018-10-25 NOTE — ED NOTES
During triage pt states she has intermittent slur speech  Speech clear during triage          Dane Vargas RN  10/25/18 2179

## 2018-10-25 NOTE — DISCHARGE INSTRUCTIONS
Migraine Headache   WHAT YOU NEED TO KNOW:   A migraine is a severe headache  The pain can be so severe that it interferes with your daily activities  A migraine can last a few hours up to several days  The exact cause of migraines is not known  DISCHARGE INSTRUCTIONS:   Return to the emergency department if:   · You have a headache that seems different or much worse than your usual migraine headache  · You have a severe headache with a fever or a stiff neck  · You have new problems with speech, vision, balance, or movement  · You feel like you are going to faint, you become confused, or you have a seizure  Contact your healthcare provider or neurologist if:   · Your migraines interfere with your daily activities  · Your medicines or treatments stop working  · You have questions or concerns about your condition or care  Medicines: You may need any of the following  Take medicine as soon as you feel a migraine begin  · Prescription pain medicine  may be given  Do not wait until the pain is severe before you take your medicine  · Migraine medicines  are used to help prevent a migraine or stop it once it starts  · Antinausea medicine  may be given to calm your stomach and to help prevent vomiting  This medicine can also help relieve pain  · Take your medicine as directed  Contact your healthcare provider if you think your medicine is not helping or if you have side effects  Tell him or her if you are allergic to any medicine  Keep a list of the medicines, vitamins, and herbs you take  Include the amounts, and when and why you take them  Bring the list or the pill bottles to follow-up visits  Carry your medicine list with you in case of an emergency  Manage your symptoms:   · Rest in a dark, quiet room  This will help decrease your pain  Sleep may also help relieve the pain  · Apply ice to decrease pain  Use an ice pack, or put crushed ice in a plastic bag   Cover the ice pack with a towel and place it on your head  Apply ice for 15 to 20 minutes every hour  · Apply heat to decrease pain and muscle spasms  Use a small towel dampened with warm water or a heating pad, or sit in a warm bath  Apply heat on the area for 20 to 30 minutes every 2 hours  You may alternate heat and ice  · Keep a migraine record  Write down when your migraines start and stop  Include your symptoms and what you were doing when a migraine began  Record what you ate or drank for 24 hours before the migraine started  Keep track of what you did to treat your migraine and if it worked  Bring the migraine record with you to visits with your healthcare provider  Follow up with your healthcare provider or neurologist as directed:  Bring your migraine record with you  Write down your questions so you remember to ask them during your visits  Prevent another migraine:   · Do not smoke  Nicotine and other chemicals in cigarettes and cigars can trigger a migraine or make it worse  Ask your healthcare provider for information if you currently smoke and need help to quit  E-cigarettes or smokeless tobacco still contain nicotine  Talk to your healthcare provider before you use these products  · Do not drink alcohol  Alcohol can trigger a migraine  It can also keep medicines used to treat your migraines from working  · Get regular exercise  Exercise may help prevent migraines  Talk to your healthcare provider about the best exercise plan for you  Try to get at least 30 minutes of exercise on most days  · Manage stress  Stress may trigger a migraine  Learn new ways to relax, such as deep breathing  · Create a sleep schedule  Go to bed and get up at the same times each day  Do not watch television before bed  · Eat regular meals  Include healthy foods such as include fruit, vegetables, whole-grain breads, low-fat dairy products, beans, lean meat, and fish   Do not have food or drinks that trigger your migraines  © 2017 [facility] Information is for End User's use only and may not be sold, redistributed or otherwise used for commercial purposes  All illustrations and images included in CareNotes® are the copyrighted property of A D A M , Inc  or [employer]  The above information is an  only  It is not intended as medical advice for individual conditions or treatments  Talk to your doctor, nurse or pharmacist before following any medical regimen to see if it is safe and effective for you  Anxiety   WHAT YOU NEED TO KNOW:   Anxiety is a condition that causes you to feel extremely worried or nervous  The feelings are so strong that they can cause problems with your daily activities or sleep  Anxiety may be triggered by something you fear, or it may happen without a cause  Family or work stress, smoking, caffeine, and alcohol can increase your risk for anxiety  Certain medicines or health conditions can also increase your risk  Anxiety can become a long-term condition if it is not managed or treated  DISCHARGE INSTRUCTIONS:   Call 911 if:   · You have chest pain, tightness, or heaviness that may spread to your shoulders, arms, jaw, neck, or back  · You feel like hurting yourself or someone else  Contact your healthcare provider if:   · Your symptoms get worse or do not get better with treatment  · Your anxiety keeps you from doing your regular daily activities  · You have new symptoms since your last visit  · You have questions or concerns about your condition or care  Medicines:   · Medicines  may be given to help you feel more calm and relaxed, and decrease your symptoms  · Take your medicine as directed  Contact your healthcare provider if you think your medicine is not helping or if you have side effects  Tell him of her if you are allergic to any medicine  Keep a list of the medicines, vitamins, and herbs you take   Include the amounts, and when and why you take them  Bring the list or the pill bottles to follow-up visits  Carry your medicine list with you in case of an emergency  Follow up with your healthcare provider within 2 weeks or as directed:  Write down your questions so you remember to ask them during your visits  Manage anxiety:   · Talk to someone about your anxiety  Your healthcare provider may suggest counseling  Cognitive behavioral therapy can help you understand and change how you react to events that trigger your symptoms  You might feel more comfortable talking with a friend or family member about your anxiety  Choose someone you know will be supportive and encouraging  · Find ways to relax  Activities such as exercise, meditation, or listening to music can help you relax  Spend time with friends, or do things you enjoy  · Practice deep breathing  Deep breathing can help you relax when you feel anxious  Focus on taking slow, deep breaths several times a day, or during an anxiety attack  Breathe in through your nose and out through your mouth  · Create a regular sleep routine  Regular sleep can help you feel calmer during the day  Go to sleep and wake up at the same times every day  Do not watch television or use the computer right before bed  Your room should be comfortable, dark, and quiet  · Eat a variety of healthy foods  Healthy foods include fruits, vegetables, low-fat dairy products, lean meats, fish, whole-grain breads, and cooked beans  Healthy foods can help you feel less anxious and have more energy  · Exercise regularly  Exercise can increase your energy level  Exercise may also lift your mood and help you sleep better  Your healthcare provider can help you create an exercise plan  · Do not smoke  Nicotine and other chemicals in cigarettes and cigars can increase anxiety  Ask your healthcare provider for information if you currently smoke and need help to quit   E-cigarettes or smokeless tobacco still contain nicotine  Talk to your healthcare provider before you use these products  · Do not have caffeine  Caffeine can make your symptoms worse  Do not have foods or drinks that are meant to increase your energy level  · Limit or do not drink alcohol  Ask your healthcare provider if alcohol is safe for you  You may not be able to drink alcohol if you take certain anxiety or depression medicines  Limit alcohol to 1 drink per day if you are a woman  Limit alcohol to 2 drinks per day if you are a man  A drink of alcohol is 12 ounces of beer, 5 ounces of wine, or 1½ ounces of liquor  · Do not use drugs  Drugs can make your anxiety worse  It can also make anxiety hard to manage  Talk to your healthcare provider if you use drugs and want help to quit  © 2017 [address] Information is for End User's use only and may not be sold, redistributed or otherwise used for commercial purposes  All illustrations and images included in CareNotes® are the copyrighted property of A D A M , Inc  or [address]  The above information is an  only  It is not intended as medical advice for individual conditions or treatments  Talk to your doctor, nurse or pharmacist before following any medical regimen to see if it is safe and effective for you  You should consider taking 400mg of Riboflavin daily    Studies have shown that Riboflavin can help reduce incidence of headache  Monae David BUNN, [facility] Prophylaxis of migraine headaches with riboflavin: A systematic review  J Clin Pharm Ther  2017 Aug;42(4):394-403  doi: 10 1111/jcpt  65495   Epub 2017 May 8 )

## 2018-10-29 ENCOUNTER — TELEPHONE (OUTPATIENT)
Dept: NEUROLOGY | Facility: CLINIC | Age: 58
End: 2018-10-29

## 2018-10-29 ENCOUNTER — TELEPHONE (OUTPATIENT)
Dept: FAMILY MEDICINE CLINIC | Facility: CLINIC | Age: 58
End: 2018-10-29

## 2018-10-29 NOTE — TELEPHONE ENCOUNTER
Left message for pt to call back  (pt has [employer] insurance - no referral required)  Why going to eye dr? Who ordered this? Etc

## 2018-10-29 NOTE — TELEPHONE ENCOUNTER
----- Message from Michele Campos, Helen Peterson sent at 10/19/2018  4:29 PM EDT -----  Regarding: HFU    Diagnosis/Reason for follow-up: migraine, ocular migraine  Subspecialty for follow-up: headache clinic  Existing neurologist: [facility]    Additional notes: patient would like to follow with [facility] Headache clinic for further management  She needs follow up in 4-6 weeks  Thank You so much!

## 2018-10-29 NOTE — TELEPHONE ENCOUNTER
PLEASE CALL PT REGARDING A REFERRAL FOR AN EYE DR Watson Ross HAD AN APPT 10/23/18 AND ALSO TODAY  CAN YOU PLEASE CALL HER  HER APPT IS AT 11 TODAY

## 2018-11-01 NOTE — TELEPHONE ENCOUNTER
Pt left a message on the referral line on 10/30/18 in response  She stated she went to [facility] for sight  Again, her insurance does not require any referrals  We should just be getting a copy of her ov note as soon as its available

## 2018-11-05 ENCOUNTER — OFFICE VISIT (OUTPATIENT)
Dept: FAMILY MEDICINE CLINIC | Facility: CLINIC | Age: 58
End: 2018-11-05
Payer: COMMERCIAL

## 2018-11-05 VITALS
WEIGHT: 171 LBS | HEIGHT: 64 IN | BODY MASS INDEX: 29.19 KG/M2 | TEMPERATURE: 96.9 F | SYSTOLIC BLOOD PRESSURE: 138 MMHG | DIASTOLIC BLOOD PRESSURE: 82 MMHG

## 2018-11-05 DIAGNOSIS — I10 HYPERTENSION, UNSPECIFIED TYPE: ICD-10-CM

## 2018-11-05 DIAGNOSIS — G43.001 MIGRAINE WITHOUT AURA AND WITH STATUS MIGRAINOSUS, NOT INTRACTABLE: Primary | ICD-10-CM

## 2018-11-05 DIAGNOSIS — I10 ESSENTIAL HYPERTENSION: ICD-10-CM

## 2018-11-05 DIAGNOSIS — E78.2 MIXED HYPERLIPIDEMIA: ICD-10-CM

## 2018-11-05 DIAGNOSIS — I10 BENIGN ESSENTIAL HYPERTENSION: ICD-10-CM

## 2018-11-05 PROCEDURE — 3079F DIAST BP 80-89 MM HG: CPT | Performed by: FAMILY MEDICINE

## 2018-11-05 PROCEDURE — 3008F BODY MASS INDEX DOCD: CPT | Performed by: FAMILY MEDICINE

## 2018-11-05 PROCEDURE — 3075F SYST BP GE 130 - 139MM HG: CPT | Performed by: FAMILY MEDICINE

## 2018-11-05 PROCEDURE — 99214 OFFICE O/P EST MOD 30 MIN: CPT | Performed by: FAMILY MEDICINE

## 2018-11-05 RX ORDER — VALSARTAN 160 MG/1
160 TABLET ORAL 2 TIMES DAILY
Qty: 90 TABLET | Refills: 0
Start: 2018-11-05 | End: 2018-12-03 | Stop reason: SDUPTHER

## 2018-11-05 RX ORDER — METOPROLOL SUCCINATE 100 MG/1
100 TABLET, EXTENDED RELEASE ORAL 2 TIMES DAILY
Qty: 180 TABLET | Refills: 1 | Status: SHIPPED | OUTPATIENT
Start: 2018-11-05 | End: 2019-09-27 | Stop reason: SDUPTHER

## 2018-11-05 NOTE — PROGRESS NOTES
Assessment/Plan:     Patient will increase Diovan to twice daily  Patient will continue with metoprolol as well as hydrochlorothiazide for hypertension  Patient will see Neurology on the 15th for further follow-up  Diagnoses and all orders for this visit:    Migraine without aura and with status migrainosus, not intractable    Benign essential hypertension    Mixed hyperlipidemia    Essential hypertension  -     metoprolol succinate (TOPROL-XL) 100 mg 24 hr tablet; Take 1 tablet (100 mg total) by mouth 2 (two) times a day    Hypertension, unspecified type         Subjective:     Patient ID: Tiffanie Levy is a 59 y o  female  Patient is here status post hospitalization for facial droop on the left associated with numbness and slurring of speech which occurred on October 18th  Patient was discharged on the 20th  Patient was admitted for CVA like symptoms  Patient also has some chest pain  Patient had normal troponin levels  Patient also had CT scan of the brain which was unremarkable as well as an MRI which showed T2 weighted image punctate lesions  No acute  Stroke findings  Patient did have some numbness in her left arm also  Patient may have had some anxiety going into the hospital   Patient also had laboratory studies done  Patient was discharged with diagnosis of migraine  Other diagnosis include hypertension  Patient was then seen in the ER more recently for left facial numbness  Patient did see ophthalmologist and does have a broken blood vessel in the left eye only  Patient still with headache  But numbness has resolved  Start speech improved also  Patient will be seeing Neurology on the 15th of this month  No new numbness in the legs or arms or weakness  No dysphagia  Review of Systems   Constitutional: Negative  HENT: Negative  Eyes: Negative  Respiratory: Negative  Cardiovascular: Negative  Gastrointestinal: Negative  Endocrine: Negative      Genitourinary: Negative  Musculoskeletal: Negative  Skin: Negative  Allergic/Immunologic: Negative  Neurological: Negative  Hematological: Negative  Psychiatric/Behavioral: Negative  Objective:     Physical Exam   Constitutional: She is oriented to person, place, and time  She appears well-developed and well-nourished  No distress  HENT:   Head: Normocephalic  Right Ear: External ear normal    Left Ear: External ear normal    Mouth/Throat: Oropharynx is clear and moist  No oropharyngeal exudate  Eyes: Pupils are equal, round, and reactive to light  EOM are normal  Right eye exhibits no discharge  Left eye exhibits no discharge  No scleral icterus  Neck: Normal range of motion  Neck supple  No thyromegaly present  Cardiovascular: Normal rate, regular rhythm, normal heart sounds and intact distal pulses  Exam reveals no gallop and no friction rub  No murmur heard  Pulmonary/Chest: Effort normal and breath sounds normal  No respiratory distress  She has no wheezes  She has no rales  She exhibits no tenderness  Abdominal: Soft  Bowel sounds are normal  She exhibits no distension  There is no tenderness  There is no rebound and no guarding  Musculoskeletal: Normal range of motion  She exhibits no edema or tenderness  Lymphadenopathy:     She has no cervical adenopathy  Neurological: She is oriented to person, place, and time  No cranial nerve deficit  She exhibits normal muscle tone  Coordination normal    Skin: Skin is warm and dry  No rash noted  She is not diaphoretic  No erythema  No pallor  Psychiatric: She has a normal mood and affect  Her behavior is normal  Judgment and thought content normal    Nursing note and vitals reviewed  There were no vitals filed for this visit  Transitional Care Management Review:  Pepe Stanton is a 59 y o  female here for TCM follow up       During the TCM phone call patient stated:    Date and time hospital follow up call was made: 10/22/2018  6:09 PM  Hospital care reviewed:  Discussed with Inpatient Physician, Records reviewed  Patient was hopsitalized at:  [facility]  Date of admission:  10/18/18  Date of discharge:  10/20/18  Diagnosis:  Stroke-like symptoms  Disposition:  Home  Were the patients medicaitons reviewed and updated:  No  Current symptoms:  None  Post hospital issues:  None  Should patient be enrolled in anticoag monitoring?:  No  Scheduled for follow up?:  Yes  Did you obtain your prescribed medications:  Yes  Do you need help managing your perscriptions or medications:  No  Is transportation to your appointments needed:  No  I have advised the patient to call PCP with any new or worsening symptoms (please type in name along with any credentials):  pierce morgan  Living Arrangements:  [address]:  None  Are you recieving outpatient services:  No  Are you recieving home care services:  No  Are you using any community resources:  No  Current waiver service:  No  Have you fallen in the last 12 months:  No  Interperter language line required?:  No  Counseling:  Patient, Family  Counseling topics:  Diagnostic results, instructions for management, Risk factor reduction, Prognosis, patient and family education, Activities of daily living, Importance of RX compliance             [facility]Ohio[facility] CenterTing queen

## 2018-11-15 ENCOUNTER — OFFICE VISIT (OUTPATIENT)
Dept: NEUROLOGY | Facility: CLINIC | Age: 58
End: 2018-11-15
Payer: COMMERCIAL

## 2018-11-15 ENCOUNTER — TELEPHONE (OUTPATIENT)
Dept: NEUROLOGY | Facility: CLINIC | Age: 58
End: 2018-11-15

## 2018-11-15 VITALS
DIASTOLIC BLOOD PRESSURE: 96 MMHG | HEIGHT: 64 IN | BODY MASS INDEX: 29.88 KG/M2 | SYSTOLIC BLOOD PRESSURE: 144 MMHG | WEIGHT: 175 LBS | HEART RATE: 62 BPM

## 2018-11-15 DIAGNOSIS — F32.A ANXIETY AND DEPRESSION: ICD-10-CM

## 2018-11-15 DIAGNOSIS — R06.83 SNORING: ICD-10-CM

## 2018-11-15 DIAGNOSIS — I10 ESSENTIAL HYPERTENSION: ICD-10-CM

## 2018-11-15 DIAGNOSIS — G47.00 INSOMNIA, UNSPECIFIED TYPE: ICD-10-CM

## 2018-11-15 DIAGNOSIS — F41.9 ANXIETY AND DEPRESSION: ICD-10-CM

## 2018-11-15 DIAGNOSIS — G43.709 CHRONIC MIGRAINE WITHOUT AURA WITHOUT STATUS MIGRAINOSUS, NOT INTRACTABLE: Primary | ICD-10-CM

## 2018-11-15 DIAGNOSIS — M54.2 CERVICALGIA: ICD-10-CM

## 2018-11-15 DIAGNOSIS — H35.30 AMD (AGE-RELATED MACULAR DEGENERATION), BILATERAL: ICD-10-CM

## 2018-11-15 DIAGNOSIS — R51.9 CHRONIC DAILY HEADACHE: ICD-10-CM

## 2018-11-15 PROCEDURE — 99215 OFFICE O/P EST HI 40 MIN: CPT | Performed by: PSYCHIATRY & NEUROLOGY

## 2018-11-15 RX ORDER — ONDANSETRON 4 MG/1
4 TABLET, FILM COATED ORAL AS NEEDED
COMMUNITY
Start: 2018-11-06 | End: 2019-05-15 | Stop reason: DRUGHIGH

## 2018-11-15 RX ORDER — INDOMETHACIN 50 MG/1
50 CAPSULE ORAL
Qty: 9 CAPSULE | Refills: 0 | Status: SHIPPED | OUTPATIENT
Start: 2018-11-15 | End: 2018-11-20

## 2018-11-15 RX ORDER — CELECOXIB 200 MG/1
200 CAPSULE ORAL
COMMUNITY
Start: 2018-11-05 | End: 2019-06-12

## 2018-11-15 RX ORDER — RANITIDINE 300 MG/1
300 TABLET ORAL 2 TIMES DAILY
COMMUNITY
Start: 2018-11-05 | End: 2019-05-13

## 2018-11-15 RX ORDER — SUCRALFATE 1 G/1
TABLET ORAL
Qty: 9 TABLET | Refills: 0 | Status: SHIPPED | OUTPATIENT
Start: 2018-11-15 | End: 2018-11-20

## 2018-11-15 NOTE — PROGRESS NOTES
[address] [facility] Consult  PATIENT:  Nita Lozano  MRN:  [id number]  :  1960  DATE OF SERVICE:  11/15/2018  REFERRED BY: No ref  provider found  PMD: Albert Holloway MD    Assessment/Plan:     Nita Lozano is a 59 y o  female referred here for evaluation of headache and follow-up hospitalization  She was admitted for stroke-like symptoms on 10/18-  She was evaluated by Neurology for acute migraine with initially suspected left facial droop, left face and left upper and lower extremity sensory deficit, suspected slurred speech and blurry vision  She had MRI brain, CTA head and neck and echocardiogram   It turns out her sensory deficit deficit is intermittent and chronic and associated with headache and it was determined that she likely had a complicated migraine  The suspected facial weakness was thought to be chronic  She reports she has had migraines for the past 15 years  Someone told her there ocular migraines, however it sounds more like migraines with associated blurry vision and eye pain  These occur multiple times a day 7 days a week and are often more on her left side than the right  She denies a warning of a headache coming on  Typical associated migraine features except for as noted above the nonspecific left-sided sensory deficit  Neurologic assessment reveals normal neurological exam, except for nonspecific continued left sensory deficit in nondermatomal or stocking-glove distribution  Visual symptoms - age related macular degeneration  She had also reported visual symptoms  From primary doctor's note in 2018 she had amaurosis fugax referred to Cardiology  She also had transient episode of left eye vision blacking out for s 5-10 minutes a week prior to admission and the day of admission 10/18/2018    Since then she has followed up with Ophthalmology (Dr Jett Chapman MD) although initially out we had was her recall of their diagnosis and it seems she does not have accurate grasp on the diagnosis  Records were faxed over after her visit and diagnosis is AMD (dry) OD, Wet AMD OS  He knows that he discussed with her findings consistent with high blood pressure and he recommended observation, control blood pressure and cholesterol  OCT scheduled for next visit  She is following with her primary for hypertension and hyperlipidemia, she is on anti hypertensives, reportedly she is allergic to statins  Left-sided gary sensory deficit  Entire left face including forehead, tongue, entire left arm and entire left leg  She thinks it is always associated with headache however she also almost always has a headache  She denies noticing any transient weakness  MRI brain was negative for acute stroke and sensory deficit continued  In my opinion, Less likely complicated migraine versus functional sensory deficit  Chronic migraine without aura without status migrainosus, not intractable  Anxiety and Depression  We discussed medication overuse headache and will prescribed indomethacin 50 mg for the next 3 days and have her start taking her over-the-counter pain medications during this time and limited in the future  She reports steroids made her too irritable in the past   She reports she has never been on a headache preventive medication  She would like to wait on trying a new prescription medicine until she has tried higher dose of her trintellix  We have discussed headache hygiene in lifestyle interventions which may help improve her headaches  She is under a great amount of stress from multiple reasons and is tearful during our visit  She admits that she feels a lot of her symptoms are related to stress  With our  get a list of psychologists are covered under her insurance and recommended she has tablets care  She was recently started on Trintellix 5 mg and has been taking for the past 3 weeks    We discussed that this is not the ideal mood medication to also help her prevent her headaches but she would like to least give it a longer trial for her anxiety and depression  We therefore recommended whomever prescribed this increase it to a more therapeutic dose  We discussed headache prevention supplements that she could take daily  Insomnia, snoring   She reports she averages 6 hr a night but has trouble falling asleep  She wakes up tired and with headaches  She snores while she sleeps has been told she stops breathing  She has never had a sleep study  We discussed that poor sleep could be contributing to her headaches as she has sleep apnea and this very much could be contributing to her headache  We will refer her to Dr Paola Marcos for sleep evaluation  Headache/migraine treatment:   Abortive medications (for immediate treatment of a headache): It is ok to take ibuprofen, acetaminophen or naproxen (Advil, Tylenol,  Aleve, Excedrin) if they help your headaches you should limit these to No more than 3 times a week to avoid medication overuse/rebound headaches  *Do not take the Celebrex or ibuprofen or other NSAIDs the next 3 days well taking indomethacin  -     indomethacin (INDOCIN) 50 mg capsule;  Take 1 capsule (50 mg total) by mouth 3 (three) times a day with meals for 3 days  -     sucralfate (CARAFATE) 1 g tablet; 1 tab PO TID 30 mins prior to the indomethacin to protect your stomach     Over the counter preventive supplements for headaches/migraines   (to take every day to help prevent headaches - not to take at the time of headache):  [x] Magnesium 500mg daily (If any diarrhea or upset stomach, decrease dose  as tolerated)  [x] Riboflavin (Vitamin B2) 400mg daily (adults)   (FYI B2 may make your urine bright/neon yellow)  AND/OR  [x] Herbal medication: Petasites/Butterbur  150 mg (adults) daily - may be harder to find this one  (When choosing your Butterbur online or in the store, beware that there are some poor preps containing pyrrolizidine alkaloids (PAs) that can be harmful to the liver  Therefore, do not use butterbur products that are not certified and labeled as hepatotoxic PA-free )    Prescription preventive medications for headaches/migraines   (to take every day to help prevent headaches - not to take at the time of headache):  [x] Trintellix 5mg - I would have your precriber of this increase to 10 mg      *Typically these types of medications take time untill you see the benefit, although some may see improvement in days, often it may take weeks, especially if the medication is being titrated up to a beneficial level  Please contact us if there are any concerns or questions regarding the medication  Self-Monitoring:  [x] Headache calendar  Each day paolo a number from 0-10 indicating if there was a headache and how bad it was  This can be used to monitor gradual improvement and is helpful to make medication adjustments  You can do this on paper or there is an MANNIE for a smart phone called "Migraine e Diary"  Lifestyle Recommendations:  [x] SLEEP - Maintain a regular sleep schedule: Adults need at least 7-8 hours of uninterrupted a night  Maintain good sleep hygiene:  Going to bed and waking up at consistent times, avoiding excessive daytime naps, avoiding caffeinated beverages in the evening, avoid excessive stimulation in the evening and generally using bed primarily for sleeping  One hour before bedtime would recommend turning lights down lower, decreasing your activity (may read quietly, listen to music at a low volume)  When you get into bed, should eliminate all technology (no texting, emailing, playing with your phone, iPad or tablet in bed)  [x] HYDRATION - Maintain good hydration  Drink  2L of fluid a day (4 typical small water bottles)  [x] DIET - Maintain good nutrition  In particular don't skip meals and try and eat healthy balanced meals regularly    [x] TRIGGERS - Look for other triggers and avoid them: Limit caffeine to 1-2 cups a day or less  Avoid dietary triggers that you have noticed bring on your headaches (this could include aged cheese, peanuts, MSG, aspartame and nitrates)  Education and Follow-up  [x] Please call with any questions or concerns  [x] Return in about 4 weeks (around 12/13/2018)  Establish care with psychology   Sleep medicine referral placed  Follow up with Ophthalmology as scheduled    CC: We had the pleasure of evaluating Pepe tSanton in neurological consultation today  she is a 59 y o    right handed female who presents today for evaluation of headaches  History of Present Illness:      10/18-20/2018:  Admitted for stroke like symptoms  Patient was evaluated by Neurology for acute migraines with left facial droop, left face and left upper extremity and left lower extremity sensory deficit, slurred speech and blurry vision  10/18/2018 transient episode of left eye vision blacking out for several minutes, also transient episode for 5-10 minutes of left eye vision blacking out the week prior  10/20/2018 neurology follow-up after MRI brain and CTA head and neck  Left facial droop, left-sided numbness, visual disturbance and balance issues had resolved and symptoms thought to be likely secondary to complicated migraine  Also noted that neurology looked at patient's 's license and asymmetry of her left nasolabial fold and left eye peers to be chronic  Ophthalmology on 10/23/18 Jessica Handley reports "I was told there was a Broken blood vessel in my left eye, and Dry something  They said it would go away on it's own" has follow up scheduled   After the patient's visit we were able to get the ophthalmology records:  diagnosis is AMD (dry) OD, Wet AMD OS  *per dilated ophthalmology exam pigment drusen OU, AV crossing changes OU, blonde fundus OU  "Dr Arias Jaron with patient no active bleeding or leakage in either eye    There are changes noted that are consistent with high blood pressure  Recommend observation, control blood pressure and cholesterol  Next visit OCT"    History of hypertension, hyperlipidemia, migraine    What is your current pain level - 7    Headaches started at what age? Still was told 15 years ago she had ocular migraines - but sounds more like migraines with blurry vision and eye pain   How often do the headaches occur? Three to 5 times a day 7 days a week  What time of the day do the headaches start? no particular time of day  How long do the headaches last? Would last all day if she did not take anything  Are you ever headache free? Yes    Aura? without aura    Describe your usual headache pain quality? Throbbing, pressure, pulsing, achy, type pain, stabbing, numbness  Where is your headache located? left retroorbital area, left frontal area, right occipital area and left temporal area  Does the pain Radiate? no radiation of pain   What is the intensity of pain?  7-10/10, sometimes 4-5/10  Associated symptoms:   [x] Nausea       [] Vomiting        [] Diarrhea         [x] Insomnia           [x] Stiff or sore neck         [x] Problems with concentration  [x] Photophobia     [x]Phonophobia      [x] Osmophobia  [] Blurred vision   [] Prefer quiet, dark room        [] Light-headed or dizzy    [] Tinnitus     [] Red ear      [] Ptosis      [] Facial droop  [x] Lacrimation - left eye only   [] Nasal congestion/rhinorrhea   [] Flushing of face         [] Change in pupil size  [x] Hands or feet tingle or feel numb/paresthesias    NUMBNESS  No numbness or tingling  In the past prior to 2 years ago  Always on the left side,  Left tongue entire left face including forehead  Also left-sided numbness entire left arm  * always associated with headache  has not noticed any transient weakness     Number of days missed per month because of headaches:  Work days:  3    Things that make the headache worse? 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   do not seem to be related to any time of the year     Have you seen someone else for headaches or pain? No  Have you had trigger point injection performed and how often? No  Have you had Botox injection performed and how often? No   Have you had epidural injections or transforaminal injections performed? No  Have you used CBD or THC for your headaches and how often? No  Are you current pregnant or planning on getting pregnant? N/A  Have you ever had any Brain imaging? yes see below    What medications do you take or have you taken for your headaches? ABORTIVE:   For milder ones - 3 tylenol and 2 advil together -> takes this every 6 hours almost every day - sometimes gets better - has been doing this for months  For stronger ones - takes fioricet - does not think it works, sometimes takes 2 fioricet - has been on this for 3 years or longer - does not take every day - 4-5 days a week will take 4  - last had it this morning    celebrex 200 mg 1x a week - helps    Medrol Dosepak in the past - however she can not remember  Recently in the ED  - it made her too irritable in the past   Migraine cocktail - made headache better - almost     imitrex made her tongue swell    PREVENTIVE: none    Alternative therapies used in the past for headaches? no other headache interventions have been tried    Neck pain?: Yes, sometimes    LIFESTYLE  Sleep  - 6 hours, trouble staying asleep    Do you wake up with headaches? Yes  Do you snore while asleep? Yes  Have you been told that you stop breathing during sleeping? Yes  Do you wake up tired in the morning? Yes  Do you take frequent naps during the day? Yes  Do you have jaw pain? TMJ  Do you grind/clench your teeth at night?  Yes  *never had sleep study     Mood:   Depression, anxiety  - no psychiatrist or psychologist  trintellix 5 mg - 3 weeks - first week and a half horrible - sick  Ativan 0 25 mg 3 times a day     In the past:  Venlafaxine - felt like a zombie  Amitriptyline when younger      The following portions of the patient's history were reviewed and updated as appropriate: allergies, current medications, past family history, past medical history, past social history, past surgical history and problem list     Past Medical History:     Past Medical History:   Diagnosis Date    Hyperlipidemia     Hypertension     Migraine     Psychiatric disorder        Patient Active Problem List   Diagnosis    Acute upper respiratory infection    Allergic rhinitis    Angina pectoris ([address] )    Anxiety and depression    Arthritis    Asthma    Attention disturbance    Benign essential hypertension    Chronic interstitial cystitis    Chronic low back pain    Diverticulitis of colon    Familial hyperlipidemia    Elevated antinuclear antibody (SON) level    Elevated blood sugar    Essential hypertension    Female pelvic pain    Hyperlipidemia    Lipid disorder    Migraines    Obesity    Tick bite    Tobacco abuse    Venous insufficiency    Anxiety       Medications:      Current Outpatient Prescriptions   Medication Sig Dispense Refill    aspirin 81 mg chewable tablet Chew 1 tablet (81 mg total) daily  0    butalbital-acetaminophen-caffeine (FIORICET,ESGIC) -40 mg per tablet Take 1 tablet by mouth      celecoxib (CeleBREX) 100 mg capsule Take 100 mg by mouth daily      fluticasone (FLONASE) 50 mcg/act nasal spray 1 spray into each nostril 2 (two) times a day      fluticasone-vilanterol (BREO ELLIPTA) 200-25 MCG/INH inhaler Inhale 1 puff daily Rinse mouth after use        hydrochlorothiazide (HYDRODIURIL) 12 5 mg tablet Take 1 tablet (12 5 mg total) by mouth daily 90 tablet 1    ibuprofen (MOTRIN) 800 mg tablet Take by mouth 2 (two) times a day      LORazepam (ATIVAN) 0 5 mg tablet Take 0 5 mg by mouth every 6 (six) hours as needed        metoprolol succinate (TOPROL-XL) 100 mg 24 hr tablet Take 1 tablet (100 mg total) by mouth 2 (two) times a day 180 tablet 1    MULTIPLE VITAMIN PO Take by mouth      pantoprazole (PROTONIX) 40 mg tablet Take 1 tablet (40 mg total) by mouth daily 30 tablet 0    valsartan (DIOVAN) 160 mg tablet Take 1 tablet (160 mg total) by mouth 2 (two) times a day 90 tablet 0    predniSONE 10 mg tablet Take 6 tabs x 2 days, then take 5 tabs x 2 days, then 4 tabs x 2 days, then 3 tabs x 2 days, then 2 tabs x 2 days, then 1 tab x 2 days, then stop (Patient not taking: Reported on 11/5/2018 ) 42 tablet 0     No current facility-administered medications for this visit  Allergies: Allergies   Allergen Reactions    Ace Inhibitors Anaphylaxis     Anaphylaxis    Beta Adrenergic Blockers Anaphylaxis     Anaphylaxis  Anaphylaxis    Atorvastatin Other (See Comments)     "throat closing up"    Barium Sulfate Other (See Comments)     hives throat closing    Other Other (See Comments)     Other reaction(s): angioadema  Other reaction(s):  Other (See Comments)  Preservatives- itching throat closes, hi  Other reaction(s): angioadema  E Z Cat - hives throat closes itching,  Preservatives- itching throat closes, hi    Sulfa Antibiotics Other (See Comments)     stuffiness,itching,hives,throat closing       Family History:   Family history of headaches:  migraine headaches in a couple sisters, mother  Any family history of aneurysms - No    Father passed away at age 78, mother passed away at age 61 of cancer  Sister with agoraphobia  Social History:   Work: [employer] tech  Education: [employer] alone  , no children    Illicit Drugs: denies  Alcohol/tobacco: Denies alcohol use, Denies tobacco use  Quit 9 years ago    Objective:   Physical Exam:                                                                 Vitals:            Constitutional:    /96 (BP Location: Right arm, Patient Position: Sitting, Cuff Size: Standard)   Pulse 62   Ht 5' 4" (1 626 m)   Wt 79 4 kg (175 lb)   LMP  (LMP Unknown)   BMI 30 04 kg/m²   BP Readings from Last 3 Encounters:   11/15/18 144/96   11/05/18 138/82   10/25/18 166/75     Pulse Readings from Last 3 Encounters:   11/15/18 62   10/25/18 56   10/20/18 65         Well developed, well nourished, well groomed  No dysmorphic features  HEENT:  Normocephalic atraumatic  No meningismus  Oropharynx is clear and moist  No oral mucosal lesions  Chest:  Respirations regular and unlabored  Cardiovascular:  Regular rate, intact distal pulses  Distal extremities warm without palpable edema or tenderness, no observed significant swelling  Musculoskeletal:  Full range of motion  (see below under neurologic exam for evaluation of motor function and gait)   Skin:  warm and dry, not diaphoretic  No apparent birthmarks or stigmata of neurocutaneous disease  Psychiatric:  Normal behavior and appropriate affect, mildly anxious       Neurological Examination:     Mental status/cognitive function:   Orientated to time, place and person  Recent and remote memory intact  Attention span and concentration as well as fund of knowledge are appropriate for age  Normal language and spontaneous speech  Cranial Nerves:  II-visual fields full  Fundi with no apparent papilledema *per dilated ophthalmology exam pigment drusen OU, AV crossing changes OU, blonde fundus OU  III, IV, VI-Pupils were equal, round, and reactive to light and accomodation  Extraocular movements were full and conjugate without nystagmus  conjugate gaze, normal smooth pursuits, normal saccades   V-facial sensation symmetric  VII-facial expression symmetric, intact forehead wrinkle, strong eye closure, symmetric smile    VIII-hearing grossly intact bilaterally   IX, X-palate elevation symmetric, no dysarthria     XI-shoulder shrug strength intact    XII-tongue protrusion midline  Motor Exam: symmetric bulk and tone throughout, no pronator drift  Power/strength 5/5 bilateral upper and lower extremities, no atrophy, fasciculations or abnormal movements noted  Sensory: grossly intact light touch in all extremities, except for she reports 80% sensation on entire left lower extremity and left upper extremity without dermatomal or stocking-glove distribution, entire left side of the face 95% sensation  Reflexes: brachioradialis 2+, biceps 2+, knee 2+, ankle 2+ bilaterally  No ankle clonus  Coordination: Finger nose finger intact bilaterally, no apparent dysmetria, ataxia or tremor noted  Gait: steady casual and tandem gait  Romberg Negative  Pertinent lab results:   Last CMP and CBC 10/25/2018 within normal limits   and 10/19/2018       Imaging:   MRI brain 10/20/2018 personally reviewed and agree with radiology read of scattered anterior bifrontal white matter T2 and FLAIR hyperintense foci which are nonspecific but suspicious for mild chronic microangiopathic changes such as may accompany migraine headaches  Demyelinating disease cannot be excluded    CTA head and neck with and without contrast 10/19/2018 no acute intracranial abnormality  No significant carotid or vertebral artery stenosis  No focal intracranial stenosis or aneurysm  Review of Systems:   ROS Personally reviewed  Review of Systems   Constitutional: Positive for fatigue  HENT: Negative  Eyes: Positive for pain and visual disturbance (left eye  blood vessel broken )  Gastrointestinal: Positive for nausea (all the time)  Endocrine: Negative  Genitourinary: Negative  Musculoskeletal: Positive for neck pain (right side )  Neurological: Positive for light-headedness, numbness (let side of face arm and fingers) and headaches (daily)  Psychiatric/Behavioral: The patient is nervous/anxious         I have spent 60 minutes with Patient  today in which greater than 50% of this time was spent in counseling/coordination of care regarding Diagnostic results, Prognosis, Risks and benefits of tx options, Intructions for management, Importance of tx compliance, Risk factor reductions and Impressions        Author:  Kingston Griffin MD 11/15/2018 9:31 AM

## 2018-11-15 NOTE — TELEPHONE ENCOUNTER
Writer was asked to look into counseling services for patient  Was able to get a list from insurance  Will provide to patient at end of appt

## 2018-11-15 NOTE — PROGRESS NOTES
Patient ID: Marcia Gilbert is a 59 y o  female  Assessment/Plan:    No problem-specific Assessment & Plan notes found for this encounter  {Assess/PlanSmartLinks:63503}       Subjective:    HPI    {[facility] HPI texts:81792}    {Common ambulatory SmartLinks:65641}         Objective:    not currently breastfeeding  Physical Exam    Neurological Exam      ROS:    Review of Systems   Constitutional: Positive for fatigue  HENT: Negative  Eyes: Positive for pain and visual disturbance (left eye  blood vessel broken )  Gastrointestinal: Positive for nausea (all the time)  Endocrine: Negative  Genitourinary: Negative  Musculoskeletal: Positive for neck pain (right side )  Neurological: Positive for light-headedness, numbness (let side of face arm and fingers) and headaches (daily)  Psychiatric/Behavioral: The patient is nervous/anxious

## 2018-11-15 NOTE — PATIENT INSTRUCTIONS
Chronic migraine without aura without status migrainosus, not intractable    Headache/migraine treatment:   Abortive medications (for immediate treatment of a headache): It is ok to take ibuprofen, acetaminophen or naproxen (Advil, Tylenol,  Aleve, Excedrin) if they help your headaches you should limit these to No more than 3 times a week to avoid medication overuse/rebound headaches  *Do not take the Celebrex or ibuprofen or other NSAIDs the next 3 days well taking indomethacin  -     indomethacin (INDOCIN) 50 mg capsule; Take 1 capsule (50 mg total) by mouth 3 (three) times a day with meals for 3 days  -     sucralfate (CARAFATE) 1 g tablet; 1 tab PO TID 30 mins prior to the indomethacin to protect your stomach   Discontinue Fioricet and try to decrease the amount of over-the-counter pain medications or taking daily    Over the counter preventive supplements for headaches/migraines   (to take every day to help prevent headaches - not to take at the time of headache):  [x] Magnesium 500mg daily (If any diarrhea or upset stomach, decrease dose  as tolerated)  [x] Riboflavin (Vitamin B2) 400mg daily (adults)   (FYI B2 may make your urine bright/neon yellow)  AND/OR  [x] Herbal medication: Pet[place]ites/Butterbur  150 mg (adults) daily - may be harder to find this one  (When choosing your Butterbur online or in the store, beware that there are some poor preps containing pyrrolizidine alkaloids (PAs) that can be harmful to the liver   Therefore, do not use butterbur products that are not certified and labeled as hepatotoxic PA-free )    Prescription preventive medications for headaches/migraines   (to take every day to help prevent headaches - not to take at the time of headache):  [x] Trintellix 5mg - I would have your precriber of this increase to 10 mg      *Typically these types of medications take time untill you see the benefit, although some may see improvement in days, often it may take weeks, especially if the medication is being titrated up to a beneficial level  Please contact us if there are any concerns or questions regarding the medication  Self-Monitoring:  [x] Headache calendar  Each day paolo a number from 0-10 indicating if there was a headache and how bad it was  This can be used to monitor gradual improvement and is helpful to make medication adjustments  You can do this on paper or there is an MANNIE for a smart phone called "Migraine e Diary"  Lifestyle Recommendations:  [x] SLEEP - Maintain a regular sleep schedule: Adults need at least 7-8 hours of uninterrupted a night  Maintain good sleep hygiene:  Going to bed and waking up at consistent times, avoiding excessive daytime naps, avoiding caffeinated beverages in the evening, avoid excessive stimulation in the evening and generally using bed primarily for sleeping  One hour before bedtime would recommend turning lights down lower, decreasing your activity (may read quietly, listen to music at a low volume)  When you get into bed, should eliminate all technology (no texting, emailing, playing with your phone, iPad or tablet in bed)  [x] HYDRATION - Maintain good hydration  Drink  2L of fluid a day (4 typical small water bottles)  [x] DIET - Maintain good nutrition  In particular don't skip meals and try and eat healthy balanced meals regularly  [x] TRIGGERS - Look for other triggers and avoid them: Limit caffeine to 1-2 cups a day or less  Avoid dietary triggers that you have noticed bring on your headaches (this could include aged cheese, peanuts, MSG, aspartame and nitrates)  Education and Follow-up  [x] Please call with any questions or concerns  [x] Return in about 4 weeks (around 12/13/2018)       Establish care with psychology -   to provide a list of covered therapists an area  Sleep medicine referral placed  For possible sleep apnea  Follow up with Ophthalmology as scheduled

## 2018-11-16 PROBLEM — G47.00 INSOMNIA: Status: ACTIVE | Noted: 2018-11-16

## 2018-11-16 PROBLEM — H35.30 AMD (AGE-RELATED MACULAR DEGENERATION), BILATERAL: Status: ACTIVE | Noted: 2018-11-16

## 2018-11-16 PROBLEM — R51.9 CHRONIC DAILY HEADACHE: Status: ACTIVE | Noted: 2018-11-16

## 2018-11-16 PROBLEM — M54.2 CERVICALGIA: Status: ACTIVE | Noted: 2018-11-16

## 2018-11-16 PROBLEM — R06.83 SNORING: Status: ACTIVE | Noted: 2018-11-16

## 2018-11-20 ENCOUNTER — TELEPHONE (OUTPATIENT)
Dept: NEUROLOGY | Facility: CLINIC | Age: 58
End: 2018-11-20

## 2018-11-20 DIAGNOSIS — G43.001 MIGRAINE WITHOUT AURA AND WITH STATUS MIGRAINOSUS, NOT INTRACTABLE: Primary | ICD-10-CM

## 2018-11-20 RX ORDER — KETOROLAC TROMETHAMINE 10 MG/1
10 TABLET, FILM COATED ORAL DAILY PRN
Qty: 10 TABLET | Refills: 0 | Status: SHIPPED | OUTPATIENT
Start: 2018-11-20 | End: 2018-12-03

## 2018-11-20 NOTE — TELEPHONE ENCOUNTER
Patient calling to give update on how she's doing  States over the weekend she had a migraine and took the indocin and carafate and these meds didn't help,  made her feel very sick, got very nauseated about 1/2-1 hour after taking indomethacin  She also increased her trintellix to 10 mg, she thinks this contributed to her sxs  She did reduce trintiellex back down to 5 mg  Currently she c/o numbness in face and arm and dizziness and states her BP is running high  She stated she did take fioricet today because her migraine was so bad, fioricet did help bring pain level to 8  BP this morning 190/96  She took BP while on the phone with me; 179/91   Advised her to call PCP and inform them of her high BP readings  She states she needs an alternative med to replace the indocin  Please advise

## 2018-11-20 NOTE — TELEPHONE ENCOUNTER
Could you make an appointment for this patient to see Dr Vince Rebollar for one hour next available please? If you speak to patient, you can mention it is for a second opinion regarding her headaches

## 2018-11-20 NOTE — TELEPHONE ENCOUNTER
It does not sound like she took the indomethacin as instructed TID for three days with Carafate prior  Regardless, she can discontinue the indomethacin and she could try toradol 10 mg PO prn  I will order  Agreed she needs to let her PMD know about her uncontrolled HTN

## 2018-11-21 NOTE — TELEPHONE ENCOUNTER
Patient reports she took all but two of the indomethacin  Patient was instructed to discontinue use of indomethacin and made aware of prescription for Toradol  Patient questioning if there is a daily preventative medication that you would recommend  Please advise  Patient asking about trying Cymbalta as she states was discussed at office visit  Patient confirmed she has been taking Magnesium daily  Has not yet started taking B2 or Butterbur  I encouraged patient that these medications were recommended during her appointment and she could take these as well to see if they help her headaches  Patient states she increased Trintellix to 10mg but that she experienced continual nausea  Patient agreeable to scheduling appointment with Dr Krishan Pierson, scheduled  2/1/19  Patient wants to keep appointment with Dr Chiara Tadeo 12/20 for follow up  Patient encouraged that she should notify her PCP today regarding her high blood pressure and that if her symptoms worsen or do not improve she should seek treatment at urgent care or ED

## 2018-12-03 ENCOUNTER — OFFICE VISIT (OUTPATIENT)
Dept: FAMILY MEDICINE CLINIC | Facility: CLINIC | Age: 58
End: 2018-12-03
Payer: COMMERCIAL

## 2018-12-03 VITALS
SYSTOLIC BLOOD PRESSURE: 158 MMHG | HEIGHT: 63 IN | BODY MASS INDEX: 30.94 KG/M2 | WEIGHT: 174.6 LBS | DIASTOLIC BLOOD PRESSURE: 80 MMHG

## 2018-12-03 DIAGNOSIS — I10 HYPERTENSION, UNSPECIFIED TYPE: ICD-10-CM

## 2018-12-03 DIAGNOSIS — T78.40XA ALLERGIC REACTION, INITIAL ENCOUNTER: Primary | ICD-10-CM

## 2018-12-03 PROCEDURE — 99213 OFFICE O/P EST LOW 20 MIN: CPT | Performed by: FAMILY MEDICINE

## 2018-12-03 PROCEDURE — 3008F BODY MASS INDEX DOCD: CPT | Performed by: FAMILY MEDICINE

## 2018-12-03 RX ORDER — BUPROPION HYDROCHLORIDE 150 MG/1
300 TABLET ORAL 2 TIMES DAILY
COMMUNITY
End: 2019-06-14 | Stop reason: SDUPTHER

## 2018-12-03 RX ORDER — VALSARTAN 160 MG/1
160 TABLET ORAL 2 TIMES DAILY
Qty: 180 TABLET | Refills: 1 | Status: SHIPPED | OUTPATIENT
Start: 2018-12-03 | End: 2019-04-15 | Stop reason: SDUPTHER

## 2018-12-03 NOTE — PROGRESS NOTES
Assessment/Plan:  Patient use Benadryl as directed  Patient does not do well on steroids and will avoid these at this time  Patient may use cool compresses  Patient may use Sarna lotion as needed       Diagnoses and all orders for this visit:    Allergic reaction, initial encounter    Hypertension, unspecified type  -     valsartan (DIOVAN) 160 mg tablet; Take 1 tablet (160 mg total) by mouth 2 (two) times a day    Other orders  -     fluticasone-umeclidinium-vilanterol (TRELEGY ELLIPTA) 100-62 5-25 MCG/INH inhaler; Inhale 1 puff daily Rinse mouth after use  -     buPROPion (WELLBUTRIN XL) 150 mg 24 hr tablet; Take 150 mg by mouth 2 (two) times a day          Subjective:      Patient ID: Chinedu Conteh is a 59 y o  female  Patient is here with erythematous rash diffusely over body which began last night after using cough syrup Cheratussin  This was from 2016  Patient also started Z-Odin but has done well with them in the past   Patient also using trilogy  Temperature today is 97 8  No difficulty swallowing, chest pain or shortness of breath  No new soaps or detergents  Rash   Associated symptoms include coughing  Cough   Associated symptoms include a rash  Medication Refill   Associated symptoms include coughing and a rash  The following portions of the patient's history were reviewed and updated as appropriate: allergies, current medications, past family history, past medical history, past social history, past surgical history and problem list     Review of Systems   Constitutional: Negative  HENT: Negative  Eyes: Negative  Respiratory: Positive for cough  Cardiovascular: Negative  Gastrointestinal: Negative  Endocrine: Negative  Genitourinary: Negative  Musculoskeletal: Negative  Skin: Positive for rash  Allergic/Immunologic: Negative  Neurological: Negative  Hematological: Negative  Psychiatric/Behavioral: Negative            Objective:      /80 (BP Location: Right arm, Patient Position: Sitting, Cuff Size: Adult)   Ht 5' 3" (1 6 m)   Wt 79 2 kg (174 lb 9 6 oz)   LMP  (LMP Unknown)   BMI 30 93 kg/m²          Physical Exam   Constitutional: She appears well-developed  Cardiovascular: Normal rate and regular rhythm  Pulmonary/Chest: Effort normal and breath sounds normal    Skin: Rash noted  Diffuse erythematous rash  The medical assistant in room for exam   Nursing note and vitals reviewed

## 2018-12-18 NOTE — TELEPHONE ENCOUNTER
fyi:  Pt called asking reason why she needed to see Dr Laverne Cummings  Advised pt per Dr Sandra Duval wanted her to see Dr Laverne Cummings for a second opinion re: her headaches  Pt verbalized understanding  Confirmed upcoming appts: 1/24/19 @ 0930 w/ Dr Sandra Duval [facility] [place] and 2/1/19 @ 3 pm w/ Dr Laverne Cummings [facility]

## 2018-12-18 NOTE — TELEPHONE ENCOUNTER
If you could let her know that the plan initially was to have her seen me sooner (last week) and then Dr Donald Gasca in February, but since both visits are now so close together, she can move the one with me back a month or two so then I can see how she is doing after Dr Donald Gasca possibly makes some changes

## 2018-12-20 NOTE — TELEPHONE ENCOUNTER
It appears pt has already changed her appts  She will be seeing dr Donna Britton 1/24 & Dr Thais Ribeiro 3/28    Is this acceptable?     Please advise

## 2019-01-14 ENCOUNTER — OFFICE VISIT (OUTPATIENT)
Dept: FAMILY MEDICINE CLINIC | Facility: CLINIC | Age: 59
End: 2019-01-14
Payer: COMMERCIAL

## 2019-01-14 ENCOUNTER — TELEPHONE (OUTPATIENT)
Dept: NEUROLOGY | Facility: CLINIC | Age: 59
End: 2019-01-14

## 2019-01-14 VITALS
WEIGHT: 172.8 LBS | SYSTOLIC BLOOD PRESSURE: 140 MMHG | BODY MASS INDEX: 30.62 KG/M2 | DIASTOLIC BLOOD PRESSURE: 90 MMHG | HEIGHT: 63 IN

## 2019-01-14 DIAGNOSIS — F41.9 ANXIETY: ICD-10-CM

## 2019-01-14 DIAGNOSIS — Z23 ENCOUNTER FOR VACCINATION: ICD-10-CM

## 2019-01-14 DIAGNOSIS — J45.909 UNCOMPLICATED ASTHMA, UNSPECIFIED ASTHMA SEVERITY, UNSPECIFIED WHETHER PERSISTENT: Primary | ICD-10-CM

## 2019-01-14 DIAGNOSIS — I10 ESSENTIAL HYPERTENSION: ICD-10-CM

## 2019-01-14 PROCEDURE — 3008F BODY MASS INDEX DOCD: CPT | Performed by: FAMILY MEDICINE

## 2019-01-14 PROCEDURE — 1036F TOBACCO NON-USER: CPT | Performed by: FAMILY MEDICINE

## 2019-01-14 PROCEDURE — 99214 OFFICE O/P EST MOD 30 MIN: CPT | Performed by: FAMILY MEDICINE

## 2019-01-14 RX ORDER — LEVALBUTEROL TARTRATE 45 UG/1
2 AEROSOL, METERED ORAL EVERY 4 HOURS PRN
Qty: 1 INHALER | Refills: 1 | Status: SHIPPED | OUTPATIENT
Start: 2019-01-14 | End: 2019-04-02

## 2019-01-14 RX ORDER — HYDROCHLOROTHIAZIDE 25 MG/1
25 TABLET ORAL DAILY
Qty: 90 TABLET | Refills: 1 | Status: SHIPPED | OUTPATIENT
Start: 2019-01-14 | End: 2019-09-23 | Stop reason: SDUPTHER

## 2019-01-14 RX ORDER — BUSPIRONE HYDROCHLORIDE 7.5 MG/1
7.5 TABLET ORAL 3 TIMES DAILY
COMMUNITY
End: 2019-07-15 | Stop reason: SDUPTHER

## 2019-01-14 NOTE — PROGRESS NOTES
Assessment/Plan:  Patient have hydrochlorothiazide increase 25 mg daily due to borderline elevated blood pressure  Asthma is fairly stable overall  Continue with current regimen of Breo  Patient will use Xopenex as needed for any exacerbations  Anxiety stable overall  Continue with current regimen of medications  Refills will be given as needed  Patient does not get pneumonia shot or flu shot  Diagnoses and all orders for this visit:    Uncomplicated asthma, unspecified asthma severity, unspecified whether persistent  -     levalbuterol (XOPENEX HFA) 45 mcg/act inhaler; Inhale 2 puffs every 4 (four) hours as needed for wheezing    Encounter for vaccination    Essential hypertension  -     hydrochlorothiazide (HYDRODIURIL) 25 mg tablet; Take 1 tablet (25 mg total) by mouth daily    Anxiety    Other orders  -     Cancel: Flu vaccine greater than or equal to 4yo preservative free IM  -     busPIRone (BUSPAR) 7 5 mg tablet; Take 7 5 mg by mouth 3 (three) times a day  -     Cancel: fluticasone-umeclidinium-vilanterol (TRELEGY ELLIPTA) 100-62 5-25 MCG/INH inhaler; Inhale 1 puff daily Rinse mouth after use  Subjective:      Patient ID: Marlene Monae is a 59 y o  female  Patient here to follow-up on hypertension, asthma, anxiety  Mood is been stable overall  Bowels and urine stable overall  Patient has had some indigestion but no other chest pain noted  The no exacerbations of asthma noted  Medication Refill         The following portions of the patient's history were reviewed and updated as appropriate: allergies, current medications, past family history, past medical history, past social history, past surgical history and problem list     Review of Systems   Constitutional: Negative  HENT: Negative  Eyes: Negative  Respiratory: Negative  Cardiovascular: Negative  Gastrointestinal: Negative  Endocrine: Negative  Genitourinary: Negative  Musculoskeletal: Negative  Skin: Negative  Allergic/Immunologic: Negative  Neurological: Negative  Hematological: Negative  Psychiatric/Behavioral: Negative  Objective:      /90 (BP Location: Right arm, Patient Position: Sitting, Cuff Size: Standard)   Ht 5' 3" (1 6 m)   Wt 78 4 kg (172 lb 12 8 oz)   LMP  (LMP Unknown)   BMI 30 61 kg/m²          Physical Exam   Constitutional: She is oriented to person, place, and time  She appears well-developed and well-nourished  No distress  HENT:   Head: Normocephalic  Right Ear: External ear normal    Left Ear: External ear normal    Mouth/Throat: Oropharynx is clear and moist  No oropharyngeal exudate  Eyes: Pupils are equal, round, and reactive to light  EOM are normal  Right eye exhibits no discharge  Left eye exhibits no discharge  No scleral icterus  Neck: Normal range of motion  Neck supple  No thyromegaly present  Cardiovascular: Normal rate, regular rhythm, normal heart sounds and intact distal pulses  Exam reveals no gallop and no friction rub  No murmur heard  Pulmonary/Chest: Effort normal and breath sounds normal  No respiratory distress  She has no wheezes  She has no rales  She exhibits no tenderness  Abdominal: Soft  Bowel sounds are normal  She exhibits no distension  There is no tenderness  There is no rebound and no guarding  Musculoskeletal: Normal range of motion  She exhibits no edema or tenderness  Lymphadenopathy:     She has no cervical adenopathy  Neurological: She is oriented to person, place, and time  No cranial nerve deficit  She exhibits normal muscle tone  Coordination normal    Skin: Skin is warm and dry  No rash noted  She is not diaphoretic  No erythema  No pallor  Psychiatric: She has a normal mood and affect  Her behavior is normal  Judgment and thought content normal    Nursing note and vitals reviewed

## 2019-01-17 ENCOUNTER — TELEPHONE (OUTPATIENT)
Dept: FAMILY MEDICINE CLINIC | Facility: CLINIC | Age: 59
End: 2019-01-17

## 2019-01-17 NOTE — TELEPHONE ENCOUNTER
Pt's insurance denied the auth for Xopenex HFA  They require a trial/failure to all formulary alternative ("but not more than 3")  The alternatives include Levalbuterol which would be $20 per 30 day thru [employer] and [employer] which 1 inhaler would be $64 25 and mail order would be $57 66  This information was obtained from [facility] @ [phone]  Please advise how to proceed  (Spoke with pt, she paid out of pocket for the medication already and I left the information on the pharmacy vmail as well   She was afraid to not have anything)

## 2019-01-17 NOTE — TELEPHONE ENCOUNTER
Spoke with patient further this afternoon regarding levalbuterol availability only in solution form for nebulization  Patient states she does have nebulizer unit at home that she has used a number of years ago (patient does not remember name of medication used at time, but states that albuterol in past has made heart race)  [facility] and spoke with pharmacist about change to levalbuterol in future, not brand name Xopenex  Pharmacist said there is no generic levalbuterol in inhaler form yet, only solution form  Patient has already picked up Xopenex and paid out of pocket for inhaler  There is no indication from patient chart that she has been on any other nebulizer solution or inhaler in past  Please advise on future course of action based on Kyra's discussion with Mary  Thank you

## 2019-03-22 NOTE — TELEPHONE ENCOUNTER
Dr Philip Ritchie,    Patient called in today and stated that she wants to go back on Vasartan 160 mg  Alternative med of Benicar is not working        She also stated that she called the [employer] to see if she would be approve and they approved her request     Please review and approve at your earliest  Deep vein thrombosis (DVT) of left lower extremity, unspecified chronicity, unspecified vein  2015  Dementia without behavioral disturbance, unspecified dementia type    Hyperlipidemia    Hypertension    Hypothyroidism    Osteoarthritis    Primary osteoarthritis of left knee

## 2019-04-02 ENCOUNTER — OFFICE VISIT (OUTPATIENT)
Dept: FAMILY MEDICINE CLINIC | Facility: CLINIC | Age: 59
End: 2019-04-02
Payer: COMMERCIAL

## 2019-04-02 VITALS
DIASTOLIC BLOOD PRESSURE: 86 MMHG | HEIGHT: 63 IN | SYSTOLIC BLOOD PRESSURE: 138 MMHG | BODY MASS INDEX: 31.18 KG/M2 | TEMPERATURE: 98.5 F | WEIGHT: 176 LBS

## 2019-04-02 DIAGNOSIS — Z00.00 VISIT FOR ANNUAL HEALTH EXAMINATION: Primary | ICD-10-CM

## 2019-04-02 DIAGNOSIS — I10 BENIGN ESSENTIAL HYPERTENSION: ICD-10-CM

## 2019-04-02 DIAGNOSIS — R00.2 PALPITATION: Primary | ICD-10-CM

## 2019-04-02 DIAGNOSIS — K29.00 ACUTE SUPERFICIAL GASTRITIS WITHOUT HEMORRHAGE: ICD-10-CM

## 2019-04-02 DIAGNOSIS — F41.9 ANXIETY: ICD-10-CM

## 2019-04-02 DIAGNOSIS — L30.4 INTERTRIGO: ICD-10-CM

## 2019-04-02 PROCEDURE — 3075F SYST BP GE 130 - 139MM HG: CPT | Performed by: FAMILY MEDICINE

## 2019-04-02 PROCEDURE — 1036F TOBACCO NON-USER: CPT | Performed by: FAMILY MEDICINE

## 2019-04-02 PROCEDURE — 3079F DIAST BP 80-89 MM HG: CPT | Performed by: FAMILY MEDICINE

## 2019-04-02 PROCEDURE — 3008F BODY MASS INDEX DOCD: CPT | Performed by: FAMILY MEDICINE

## 2019-04-02 PROCEDURE — 99214 OFFICE O/P EST MOD 30 MIN: CPT | Performed by: FAMILY MEDICINE

## 2019-04-02 RX ORDER — SUCRALFATE 1 G/1
1 TABLET ORAL 4 TIMES DAILY
Qty: 120 TABLET | Refills: 5 | Status: SHIPPED | OUTPATIENT
Start: 2019-04-02 | End: 2019-11-27

## 2019-04-09 RX ORDER — AMLODIPINE BESYLATE 5 MG/1
5 TABLET ORAL
COMMUNITY
Start: 2011-08-15 | End: 2019-04-12 | Stop reason: SINTOL

## 2019-04-11 DIAGNOSIS — Z00.00 WELL ADULT EXAM: Primary | ICD-10-CM

## 2019-04-12 ENCOUNTER — CONSULT (OUTPATIENT)
Dept: CARDIOLOGY CLINIC | Facility: CLINIC | Age: 59
End: 2019-04-12
Payer: COMMERCIAL

## 2019-04-12 VITALS
HEART RATE: 56 BPM | HEIGHT: 63 IN | SYSTOLIC BLOOD PRESSURE: 112 MMHG | BODY MASS INDEX: 31.71 KG/M2 | DIASTOLIC BLOOD PRESSURE: 58 MMHG | WEIGHT: 179 LBS

## 2019-04-12 DIAGNOSIS — R00.2 PALPITATION: Primary | ICD-10-CM

## 2019-04-12 DIAGNOSIS — R07.9 CHEST PAIN, UNSPECIFIED TYPE: ICD-10-CM

## 2019-04-12 PROCEDURE — 93000 ELECTROCARDIOGRAM COMPLETE: CPT | Performed by: INTERNAL MEDICINE

## 2019-04-12 PROCEDURE — 99215 OFFICE O/P EST HI 40 MIN: CPT | Performed by: INTERNAL MEDICINE

## 2019-04-15 DIAGNOSIS — I10 HYPERTENSION, UNSPECIFIED TYPE: ICD-10-CM

## 2019-04-15 RX ORDER — VALSARTAN 160 MG/1
160 TABLET ORAL 2 TIMES DAILY
Qty: 180 TABLET | Refills: 1 | Status: SHIPPED | OUTPATIENT
Start: 2019-04-15 | End: 2019-10-02 | Stop reason: SDUPTHER

## 2019-04-24 ENCOUNTER — TELEPHONE (OUTPATIENT)
Dept: FAMILY MEDICINE CLINIC | Facility: CLINIC | Age: 59
End: 2019-04-24

## 2019-04-24 DIAGNOSIS — F32.A ANXIETY AND DEPRESSION: Primary | ICD-10-CM

## 2019-04-24 DIAGNOSIS — F41.9 ANXIETY AND DEPRESSION: Primary | ICD-10-CM

## 2019-04-24 DIAGNOSIS — R73.9 ELEVATED BLOOD SUGAR: Primary | ICD-10-CM

## 2019-04-24 DIAGNOSIS — R76.8 ELEVATED ANTINUCLEAR ANTIBODY (ANA) LEVEL: ICD-10-CM

## 2019-04-24 DIAGNOSIS — R00.2 PALPITATION: ICD-10-CM

## 2019-04-24 DIAGNOSIS — I10 BENIGN ESSENTIAL HYPERTENSION: ICD-10-CM

## 2019-04-24 DIAGNOSIS — E78.2 MIXED HYPERLIPIDEMIA: ICD-10-CM

## 2019-04-25 DIAGNOSIS — R00.2 PALPITATION: ICD-10-CM

## 2019-04-25 DIAGNOSIS — R07.9 CHEST PAIN, UNSPECIFIED TYPE: Primary | ICD-10-CM

## 2019-05-13 ENCOUNTER — OFFICE VISIT (OUTPATIENT)
Dept: FAMILY MEDICINE CLINIC | Facility: CLINIC | Age: 59
End: 2019-05-13
Payer: COMMERCIAL

## 2019-05-13 VITALS
TEMPERATURE: 97.4 F | BODY MASS INDEX: 32.25 KG/M2 | DIASTOLIC BLOOD PRESSURE: 76 MMHG | HEIGHT: 63 IN | SYSTOLIC BLOOD PRESSURE: 122 MMHG | WEIGHT: 182 LBS

## 2019-05-13 DIAGNOSIS — J30.1 SEASONAL ALLERGIC RHINITIS DUE TO POLLEN: ICD-10-CM

## 2019-05-13 DIAGNOSIS — R10.11 RIGHT UPPER QUADRANT PAIN: Primary | ICD-10-CM

## 2019-05-13 DIAGNOSIS — Z12.11 COLON CANCER SCREENING: ICD-10-CM

## 2019-05-13 DIAGNOSIS — R10.13 EPIGASTRIC PAIN: ICD-10-CM

## 2019-05-13 DIAGNOSIS — Z11.59 NEED FOR HEPATITIS C SCREENING TEST: ICD-10-CM

## 2019-05-13 PROCEDURE — 99213 OFFICE O/P EST LOW 20 MIN: CPT | Performed by: FAMILY MEDICINE

## 2019-05-13 RX ORDER — DEXLANSOPRAZOLE 60 MG/1
60 CAPSULE, DELAYED RELEASE ORAL DAILY
Qty: 90 CAPSULE | Refills: 1 | Status: SHIPPED | OUTPATIENT
Start: 2019-05-13 | End: 2019-05-15 | Stop reason: CLARIF

## 2019-05-13 RX ORDER — FLUTICASONE PROPIONATE 50 MCG
1 SPRAY, SUSPENSION (ML) NASAL 2 TIMES DAILY
Qty: 1 BOTTLE | Refills: 3 | Status: SHIPPED | OUTPATIENT
Start: 2019-05-13 | End: 2019-11-13 | Stop reason: SDUPTHER

## 2019-05-14 ENCOUNTER — APPOINTMENT (OUTPATIENT)
Dept: LAB | Facility: HOSPITAL | Age: 59
End: 2019-05-14
Payer: COMMERCIAL

## 2019-05-14 ENCOUNTER — HOSPITAL ENCOUNTER (OUTPATIENT)
Dept: ULTRASOUND IMAGING | Facility: HOSPITAL | Age: 59
Discharge: HOME | End: 2019-05-14
Payer: COMMERCIAL

## 2019-05-14 DIAGNOSIS — R07.9 CHEST PAIN, UNSPECIFIED TYPE: ICD-10-CM

## 2019-05-14 DIAGNOSIS — F32.A ANXIETY AND DEPRESSION: ICD-10-CM

## 2019-05-14 DIAGNOSIS — F41.9 ANXIETY AND DEPRESSION: ICD-10-CM

## 2019-05-14 DIAGNOSIS — Z00.00 VISIT FOR ANNUAL HEALTH EXAMINATION: ICD-10-CM

## 2019-05-14 DIAGNOSIS — Z00.00 WELL ADULT EXAM: ICD-10-CM

## 2019-05-14 DIAGNOSIS — R00.2 PALPITATION: ICD-10-CM

## 2019-05-14 DIAGNOSIS — R10.11 RIGHT UPPER QUADRANT PAIN: ICD-10-CM

## 2019-05-14 DIAGNOSIS — Z11.59 NEED FOR HEPATITIS C SCREENING TEST: ICD-10-CM

## 2019-05-14 DIAGNOSIS — R10.13 EPIGASTRIC PAIN: ICD-10-CM

## 2019-05-14 DIAGNOSIS — I10 BENIGN ESSENTIAL HYPERTENSION: ICD-10-CM

## 2019-05-14 LAB
ALBUMIN SERPL BCP-MCNC: 4.3 G/DL (ref 3.5–5)
ALP SERPL-CCNC: 81 U/L (ref 46–116)
ALT SERPL W P-5'-P-CCNC: 44 U/L (ref 12–78)
ANION GAP SERPL CALCULATED.3IONS-SCNC: 8 MMOL/L (ref 4–13)
AST SERPL W P-5'-P-CCNC: 29 U/L (ref 5–45)
BASOPHILS # BLD AUTO: 0.04 THOUSANDS/ΜL (ref 0–0.1)
BASOPHILS NFR BLD AUTO: 1 % (ref 0–1)
BILIRUB SERPL-MCNC: 0.48 MG/DL (ref 0.2–1)
BUN SERPL-MCNC: 28 MG/DL (ref 5–25)
CALCIUM SERPL-MCNC: 9.9 MG/DL (ref 8.3–10.1)
CHLORIDE SERPL-SCNC: 102 MMOL/L (ref 100–108)
CHOLEST SERPL-MCNC: 347 MG/DL (ref 50–200)
CO2 SERPL-SCNC: 28 MMOL/L (ref 21–32)
CREAT SERPL-MCNC: 1.22 MG/DL (ref 0.6–1.3)
CRP SERPL QL: 6.1 MG/L
EOSINOPHIL # BLD AUTO: 0.15 THOUSAND/ΜL (ref 0–0.61)
EOSINOPHIL NFR BLD AUTO: 3 % (ref 0–6)
ERYTHROCYTE [DISTWIDTH] IN BLOOD BY AUTOMATED COUNT: 11.9 % (ref 11.6–15.1)
ERYTHROCYTE [SEDIMENTATION RATE] IN BLOOD: 28 MM/HOUR (ref 0–20)
ESTRADIOL SERPL-MCNC: 15 PG/ML
FSH SERPL-ACNC: 100.1 MIU/ML
GFR SERPL CREATININE-BSD FRML MDRD: 48 ML/MIN/1.73SQ M
GLUCOSE P FAST SERPL-MCNC: 113 MG/DL (ref 65–99)
HCT VFR BLD AUTO: 36.6 % (ref 34.8–46.1)
HCV AB SER QL: NORMAL
HDLC SERPL-MCNC: 69 MG/DL (ref 40–60)
HGB BLD-MCNC: 12.2 G/DL (ref 11.5–15.4)
IMM GRANULOCYTES # BLD AUTO: 0.03 THOUSAND/UL (ref 0–0.2)
IMM GRANULOCYTES NFR BLD AUTO: 1 % (ref 0–2)
LDLC SERPL CALC-MCNC: 245 MG/DL (ref 0–100)
LH SERPL-ACNC: 53 MIU/ML
LYMPHOCYTES # BLD AUTO: 1.14 THOUSANDS/ΜL (ref 0.6–4.47)
LYMPHOCYTES NFR BLD AUTO: 22 % (ref 14–44)
MCH RBC QN AUTO: 31.8 PG (ref 26.8–34.3)
MCHC RBC AUTO-ENTMCNC: 33.3 G/DL (ref 31.4–37.4)
MCV RBC AUTO: 95 FL (ref 82–98)
MONOCYTES # BLD AUTO: 0.45 THOUSAND/ΜL (ref 0.17–1.22)
MONOCYTES NFR BLD AUTO: 9 % (ref 4–12)
NEUTROPHILS # BLD AUTO: 3.38 THOUSANDS/ΜL (ref 1.85–7.62)
NEUTS SEG NFR BLD AUTO: 64 % (ref 43–75)
NONHDLC SERPL-MCNC: 278 MG/DL
NRBC BLD AUTO-RTO: 0 /100 WBCS
PLATELET # BLD AUTO: 262 THOUSANDS/UL (ref 149–390)
PMV BLD AUTO: 8.8 FL (ref 8.9–12.7)
POTASSIUM SERPL-SCNC: 4.5 MMOL/L (ref 3.5–5.3)
PROGEST SERPL-MCNC: <0.2 NG/ML
PROT SERPL-MCNC: 7.9 G/DL (ref 6.4–8.2)
RBC # BLD AUTO: 3.84 MILLION/UL (ref 3.81–5.12)
SODIUM SERPL-SCNC: 138 MMOL/L (ref 136–145)
TRIGL SERPL-MCNC: 163 MG/DL
TSH SERPL DL<=0.05 MIU/L-ACNC: 2.01 UIU/ML (ref 0.36–3.74)
WBC # BLD AUTO: 5.19 THOUSAND/UL (ref 4.31–10.16)

## 2019-05-14 PROCEDURE — 80053 COMPREHEN METABOLIC PANEL: CPT

## 2019-05-14 PROCEDURE — 80061 LIPID PANEL: CPT

## 2019-05-14 PROCEDURE — 85652 RBC SED RATE AUTOMATED: CPT

## 2019-05-14 PROCEDURE — 76705 ECHO EXAM OF ABDOMEN: CPT

## 2019-05-14 PROCEDURE — 83002 ASSAY OF GONADOTROPIN (LH): CPT

## 2019-05-14 PROCEDURE — 85025 COMPLETE CBC W/AUTO DIFF WBC: CPT

## 2019-05-14 PROCEDURE — 86803 HEPATITIS C AB TEST: CPT

## 2019-05-14 PROCEDURE — 83001 ASSAY OF GONADOTROPIN (FSH): CPT

## 2019-05-14 PROCEDURE — 84443 ASSAY THYROID STIM HORMONE: CPT

## 2019-05-14 PROCEDURE — 84144 ASSAY OF PROGESTERONE: CPT

## 2019-05-14 PROCEDURE — 86140 C-REACTIVE PROTEIN: CPT

## 2019-05-14 PROCEDURE — 82670 ASSAY OF TOTAL ESTRADIOL: CPT

## 2019-05-14 PROCEDURE — 36415 COLL VENOUS BLD VENIPUNCTURE: CPT

## 2019-05-15 ENCOUNTER — TELEPHONE (OUTPATIENT)
Dept: FAMILY MEDICINE CLINIC | Facility: CLINIC | Age: 59
End: 2019-05-15

## 2019-05-15 DIAGNOSIS — R10.84 GENERALIZED ABDOMINAL PAIN: Primary | ICD-10-CM

## 2019-05-15 DIAGNOSIS — K25.3 ACUTE GASTRIC ULCER WITHOUT HEMORRHAGE OR PERFORATION: Primary | ICD-10-CM

## 2019-05-16 ENCOUNTER — TELEPHONE (OUTPATIENT)
Dept: FAMILY MEDICINE CLINIC | Facility: CLINIC | Age: 59
End: 2019-05-16

## 2019-05-16 RX ORDER — ONDANSETRON HYDROCHLORIDE 8 MG/1
8 TABLET, FILM COATED ORAL EVERY 8 HOURS PRN
Qty: 60 TABLET | Refills: 0 | Status: SHIPPED | OUTPATIENT
Start: 2019-05-16 | End: 2019-06-12 | Stop reason: SDUPTHER

## 2019-05-16 RX ORDER — PANTOPRAZOLE SODIUM 40 MG/1
40 TABLET, DELAYED RELEASE ORAL DAILY
Qty: 30 TABLET | Refills: 2 | Status: SHIPPED | OUTPATIENT
Start: 2019-05-16 | End: 2019-06-12

## 2019-05-20 ENCOUNTER — CONSULT (OUTPATIENT)
Dept: SURGERY | Facility: CLINIC | Age: 59
End: 2019-05-20
Payer: COMMERCIAL

## 2019-05-20 VITALS
HEART RATE: 57 BPM | WEIGHT: 182 LBS | SYSTOLIC BLOOD PRESSURE: 122 MMHG | BODY MASS INDEX: 32.25 KG/M2 | HEIGHT: 63 IN | DIASTOLIC BLOOD PRESSURE: 74 MMHG | RESPIRATION RATE: 18 BRPM | TEMPERATURE: 97.4 F

## 2019-05-20 DIAGNOSIS — R10.13 EPIGASTRIC PAIN: ICD-10-CM

## 2019-05-20 DIAGNOSIS — R10.11 RIGHT UPPER QUADRANT PAIN: ICD-10-CM

## 2019-05-20 PROCEDURE — 99243 OFF/OP CNSLTJ NEW/EST LOW 30: CPT | Performed by: PHYSICIAN ASSISTANT

## 2019-05-20 RX ORDER — RANITIDINE 150 MG/1
150 TABLET ORAL 2 TIMES DAILY
COMMUNITY
End: 2019-06-12

## 2019-05-24 ENCOUNTER — TELEPHONE (OUTPATIENT)
Dept: SURGERY | Facility: CLINIC | Age: 59
End: 2019-05-24

## 2019-05-24 ENCOUNTER — ANESTHESIA EVENT (OUTPATIENT)
Dept: GASTROENTEROLOGY | Facility: HOSPITAL | Age: 59
End: 2019-05-24

## 2019-05-28 ENCOUNTER — ANESTHESIA (OUTPATIENT)
Dept: GASTROENTEROLOGY | Facility: HOSPITAL | Age: 59
End: 2019-05-28

## 2019-05-28 ENCOUNTER — HOSPITAL ENCOUNTER (OUTPATIENT)
Dept: GASTROENTEROLOGY | Facility: HOSPITAL | Age: 59
Setting detail: OUTPATIENT SURGERY
Discharge: HOME | End: 2019-05-28
Attending: SURGERY
Payer: COMMERCIAL

## 2019-05-28 VITALS
BODY MASS INDEX: 32.25 KG/M2 | OXYGEN SATURATION: 99 % | DIASTOLIC BLOOD PRESSURE: 49 MMHG | HEART RATE: 74 BPM | WEIGHT: 182 LBS | RESPIRATION RATE: 14 BRPM | HEIGHT: 63 IN | TEMPERATURE: 97.4 F | SYSTOLIC BLOOD PRESSURE: 118 MMHG

## 2019-05-28 DIAGNOSIS — Z87.11 HISTORY OF STOMACH ULCERS: Primary | ICD-10-CM

## 2019-05-28 DIAGNOSIS — R10.11 RIGHT UPPER QUADRANT PAIN: ICD-10-CM

## 2019-05-28 DIAGNOSIS — R10.13 EPIGASTRIC PAIN: ICD-10-CM

## 2019-05-28 PROCEDURE — 88313 SPECIAL STAINS GROUP 2: CPT | Performed by: PATHOLOGY

## 2019-05-28 PROCEDURE — 88342 IMHCHEM/IMCYTCHM 1ST ANTB: CPT | Performed by: PATHOLOGY

## 2019-05-28 PROCEDURE — 88341 IMHCHEM/IMCYTCHM EA ADD ANTB: CPT | Performed by: PATHOLOGY

## 2019-05-28 PROCEDURE — 88305 TISSUE EXAM BY PATHOLOGIST: CPT | Performed by: PATHOLOGY

## 2019-05-28 PROCEDURE — 45378 DIAGNOSTIC COLONOSCOPY: CPT | Performed by: SURGERY

## 2019-05-28 PROCEDURE — 43239 EGD BIOPSY SINGLE/MULTIPLE: CPT | Performed by: SURGERY

## 2019-05-28 RX ORDER — EPHEDRINE SULFATE 50 MG/ML
INJECTION INTRAVENOUS AS NEEDED
Status: DISCONTINUED | OUTPATIENT
Start: 2019-05-28 | End: 2019-05-28 | Stop reason: SURG

## 2019-05-28 RX ORDER — PROPOFOL 10 MG/ML
INJECTION, EMULSION INTRAVENOUS AS NEEDED
Status: DISCONTINUED | OUTPATIENT
Start: 2019-05-28 | End: 2019-05-28 | Stop reason: SURG

## 2019-05-28 RX ORDER — SODIUM CHLORIDE 9 MG/ML
125 INJECTION, SOLUTION INTRAVENOUS CONTINUOUS
Status: DISCONTINUED | OUTPATIENT
Start: 2019-05-28 | End: 2019-06-01 | Stop reason: HOSPADM

## 2019-05-28 RX ADMIN — PROPOFOL 100 MG: 10 INJECTION, EMULSION INTRAVENOUS at 10:41

## 2019-05-28 RX ADMIN — PROPOFOL 50 MG: 10 INJECTION, EMULSION INTRAVENOUS at 10:42

## 2019-05-28 RX ADMIN — EPHEDRINE SULFATE 15 MG: 50 INJECTION, SOLUTION INTRAVENOUS at 11:11

## 2019-05-28 RX ADMIN — PROPOFOL 50 MG: 10 INJECTION, EMULSION INTRAVENOUS at 10:48

## 2019-05-28 RX ADMIN — PROPOFOL 50 MG: 10 INJECTION, EMULSION INTRAVENOUS at 10:45

## 2019-05-28 RX ADMIN — SODIUM CHLORIDE 125 ML/HR: 0.9 INJECTION, SOLUTION INTRAVENOUS at 10:00

## 2019-05-28 RX ADMIN — PROPOFOL 30 MG: 10 INJECTION, EMULSION INTRAVENOUS at 10:49

## 2019-05-28 RX ADMIN — LIDOCAINE HYDROCHLORIDE 100 MG: 20 INJECTION, SOLUTION INTRAVENOUS at 10:40

## 2019-05-28 RX ADMIN — SODIUM CHLORIDE: 0.9 INJECTION, SOLUTION INTRAVENOUS at 11:11

## 2019-05-29 ENCOUNTER — TELEPHONE (OUTPATIENT)
Dept: SURGERY | Facility: CLINIC | Age: 59
End: 2019-05-29

## 2019-05-29 ENCOUNTER — HOSPITAL ENCOUNTER (OUTPATIENT)
Dept: NUCLEAR MEDICINE | Facility: HOSPITAL | Age: 59
Discharge: HOME | End: 2019-05-29
Attending: SURGERY
Payer: COMMERCIAL

## 2019-05-29 DIAGNOSIS — R10.13 EPIGASTRIC PAIN: ICD-10-CM

## 2019-05-29 PROCEDURE — A9537 TC99M MEBROFENIN: HCPCS

## 2019-05-29 PROCEDURE — 78227 HEPATOBIL SYST IMAGE W/DRUG: CPT

## 2019-06-12 ENCOUNTER — OFFICE VISIT (OUTPATIENT)
Dept: FAMILY MEDICINE CLINIC | Facility: CLINIC | Age: 59
End: 2019-06-12
Payer: COMMERCIAL

## 2019-06-12 VITALS
HEIGHT: 63 IN | DIASTOLIC BLOOD PRESSURE: 72 MMHG | WEIGHT: 188 LBS | SYSTOLIC BLOOD PRESSURE: 122 MMHG | TEMPERATURE: 97.4 F | BODY MASS INDEX: 33.31 KG/M2

## 2019-06-12 DIAGNOSIS — R10.13 EPIGASTRIC PAIN: ICD-10-CM

## 2019-06-12 DIAGNOSIS — R10.84 GENERALIZED ABDOMINAL PAIN: ICD-10-CM

## 2019-06-12 DIAGNOSIS — R10.11 RIGHT UPPER QUADRANT PAIN: Primary | ICD-10-CM

## 2019-06-12 DIAGNOSIS — K25.3 ACUTE GASTRIC ULCER WITHOUT HEMORRHAGE OR PERFORATION: ICD-10-CM

## 2019-06-12 PROCEDURE — 99213 OFFICE O/P EST LOW 20 MIN: CPT | Performed by: FAMILY MEDICINE

## 2019-06-12 PROCEDURE — 3008F BODY MASS INDEX DOCD: CPT | Performed by: FAMILY MEDICINE

## 2019-06-12 PROCEDURE — 1036F TOBACCO NON-USER: CPT | Performed by: FAMILY MEDICINE

## 2019-06-12 RX ORDER — ONDANSETRON HYDROCHLORIDE 8 MG/1
8 TABLET, FILM COATED ORAL EVERY 8 HOURS PRN
Qty: 60 TABLET | Refills: 0 | Status: SHIPPED | OUTPATIENT
Start: 2019-06-12 | End: 2019-07-15 | Stop reason: SDUPTHER

## 2019-06-12 RX ORDER — DEXLANSOPRAZOLE 60 MG/1
60 CAPSULE, DELAYED RELEASE ORAL DAILY
Qty: 30 CAPSULE | Refills: 5 | Status: SHIPPED | OUTPATIENT
Start: 2019-06-12 | End: 2019-08-14 | Stop reason: SDUPTHER

## 2019-06-14 DIAGNOSIS — F32.A ANXIETY AND DEPRESSION: Primary | ICD-10-CM

## 2019-06-14 DIAGNOSIS — K64.9 HEMORRHOIDS, UNSPECIFIED HEMORRHOID TYPE: ICD-10-CM

## 2019-06-14 DIAGNOSIS — F41.9 ANXIETY AND DEPRESSION: Primary | ICD-10-CM

## 2019-06-14 RX ORDER — BUPROPION HYDROCHLORIDE 150 MG/1
300 TABLET ORAL 2 TIMES DAILY
Qty: 60 TABLET | Refills: 1 | Status: SHIPPED | OUTPATIENT
Start: 2019-06-14 | End: 2019-07-20 | Stop reason: SDUPTHER

## 2019-06-19 ENCOUNTER — TELEPHONE (OUTPATIENT)
Dept: FAMILY MEDICINE CLINIC | Facility: CLINIC | Age: 59
End: 2019-06-19

## 2019-06-19 RX ORDER — LANSOPRAZOLE 15 MG/1
15 CAPSULE, DELAYED RELEASE ORAL DAILY
Qty: 30 CAPSULE | Refills: 1 | Status: SHIPPED | OUTPATIENT
Start: 2019-06-19 | End: 2019-07-15

## 2019-07-15 ENCOUNTER — OFFICE VISIT (OUTPATIENT)
Dept: FAMILY MEDICINE CLINIC | Facility: CLINIC | Age: 59
End: 2019-07-15
Payer: COMMERCIAL

## 2019-07-15 VITALS
HEART RATE: 85 BPM | DIASTOLIC BLOOD PRESSURE: 74 MMHG | HEIGHT: 63 IN | WEIGHT: 190 LBS | SYSTOLIC BLOOD PRESSURE: 134 MMHG | BODY MASS INDEX: 33.66 KG/M2

## 2019-07-15 DIAGNOSIS — F41.9 ANXIETY AND DEPRESSION: ICD-10-CM

## 2019-07-15 DIAGNOSIS — R10.84 GENERALIZED ABDOMINAL PAIN: Primary | ICD-10-CM

## 2019-07-15 DIAGNOSIS — J45.909 MODERATE ASTHMA, UNSPECIFIED WHETHER COMPLICATED, UNSPECIFIED WHETHER PERSISTENT: ICD-10-CM

## 2019-07-15 DIAGNOSIS — F32.A ANXIETY AND DEPRESSION: ICD-10-CM

## 2019-07-15 PROBLEM — B35.4 TINEA CORPORIS: Status: ACTIVE | Noted: 2019-07-15

## 2019-07-15 PROCEDURE — 99214 OFFICE O/P EST MOD 30 MIN: CPT | Performed by: FAMILY MEDICINE

## 2019-07-15 RX ORDER — BUSPIRONE HYDROCHLORIDE 15 MG/1
7.5 TABLET ORAL 4 TIMES DAILY
Qty: 120 TABLET | Refills: 3 | Status: SHIPPED | OUTPATIENT
Start: 2019-07-15 | End: 2019-07-15 | Stop reason: SDUPTHER

## 2019-07-15 RX ORDER — BUSPIRONE HYDROCHLORIDE 15 MG/1
15 TABLET ORAL 4 TIMES DAILY
Qty: 120 TABLET | Refills: 3 | Status: SHIPPED | OUTPATIENT
Start: 2019-07-15 | End: 2019-11-27

## 2019-07-15 RX ORDER — ONDANSETRON HYDROCHLORIDE 8 MG/1
8 TABLET, FILM COATED ORAL EVERY 8 HOURS PRN
Qty: 60 TABLET | Refills: 0 | Status: SHIPPED | OUTPATIENT
Start: 2019-07-15 | End: 2019-08-12 | Stop reason: SDUPTHER

## 2019-07-15 RX ORDER — DEXLANSOPRAZOLE 60 MG/1
60 CAPSULE, DELAYED RELEASE ORAL DAILY
Qty: 30 CAPSULE | Refills: 5 | Status: SHIPPED | OUTPATIENT
Start: 2019-07-15 | End: 2019-11-27

## 2019-07-15 NOTE — PROGRESS NOTES
Assessment/Plan:  Patient use econazole daily as directed for tinea  Patient use Dexilant for gastric issues  Will try to get prior authorization as she has failed multiple other medications as noted in HPI  Diagnoses and all orders for this visit:    Generalized abdominal pain  -     ondansetron (ZOFRAN) 8 mg tablet; Take 1 tablet (8 mg total) by mouth every 8 (eight) hours as needed for nausea or vomiting  -     dexlansoprazole (DEXILANT) 60 MG capsule; Take 1 capsule (60 mg total) by mouth daily    Anxiety and depression  -     Discontinue: busPIRone (BUSPAR) 15 mg tablet; Take 0 5 tablets (7 5 mg total) by mouth 4 (four) times a day  -     busPIRone (BUSPAR) 15 mg tablet; Take 1 tablet (15 mg total) by mouth 4 (four) times a day    Moderate asthma, unspecified whether complicated, unspecified whether persistent          Subjective:      Patient ID: Nia Harris is a 62 y o  female  Patient is here for follow-up on GERD/abdominal discomfort  Patient having worsening symptoms  Patient wishes to go back on Dexilant  Patient has failed multiple proton pump inhibitors including Prevacid, Protonix, Zantac, omeprazole  Patient with rash on right forearm which is been there for roughly 2 weeks  No significant itching noted  Patient did notice a raise blister as well as some white dry skin on the periphery  No new topical agents use  No treatment use  The following portions of the patient's history were reviewed and updated as appropriate: allergies, current medications, past family history, past medical history, past social history, past surgical history and problem list     Review of Systems   Constitutional: Negative  HENT: Negative  Eyes: Negative  Respiratory: Negative  Cardiovascular: Negative  Gastrointestinal: Positive for abdominal pain  Endocrine: Negative  Genitourinary: Negative  Musculoskeletal: Negative  Skin: Positive for rash     Allergic/Immunologic: Negative  Neurological: Negative  Hematological: Negative  Psychiatric/Behavioral: Negative  Objective:      /74 (BP Location: Right arm)   Pulse 85   Ht 5' 3" (1 6 m)   Wt 86 2 kg (190 lb)   LMP  (LMP Unknown)   BMI 33 66 kg/m²          Physical Exam   Constitutional: She is oriented to person, place, and time  She appears well-developed and well-nourished  No distress  HENT:   Head: Normocephalic  Right Ear: External ear normal    Left Ear: External ear normal    Mouth/Throat: Oropharynx is clear and moist  No oropharyngeal exudate  Eyes: Pupils are equal, round, and reactive to light  EOM are normal  Right eye exhibits no discharge  Left eye exhibits no discharge  No scleral icterus  Neck: Normal range of motion  Neck supple  No thyromegaly present  Cardiovascular: Normal rate, regular rhythm, normal heart sounds and intact distal pulses  Exam reveals no gallop and no friction rub  No murmur heard  Pulmonary/Chest: Effort normal and breath sounds normal  No respiratory distress  She has no wheezes  She has no rales  She exhibits no tenderness  Abdominal: Soft  Bowel sounds are normal    Musculoskeletal: Normal range of motion  She exhibits no edema or tenderness  Lymphadenopathy:     She has no cervical adenopathy  Neurological: She is oriented to person, place, and time  No cranial nerve deficit  She exhibits normal muscle tone  Coordination normal    Skin: Skin is warm and dry  Rash noted  She is not diaphoretic  No erythema  No pallor  Circular patch which is erythematous on right forearm   Psychiatric: She has a normal mood and affect  Her behavior is normal  Judgment and thought content normal    Nursing note and vitals reviewed

## 2019-07-20 DIAGNOSIS — F32.A ANXIETY AND DEPRESSION: ICD-10-CM

## 2019-07-20 DIAGNOSIS — F41.9 ANXIETY AND DEPRESSION: ICD-10-CM

## 2019-07-23 RX ORDER — BUPROPION HYDROCHLORIDE 150 MG/1
TABLET ORAL
Qty: 60 TABLET | Refills: 0 | Status: SHIPPED | OUTPATIENT
Start: 2019-07-23 | End: 2019-09-23 | Stop reason: SDUPTHER

## 2019-07-29 DIAGNOSIS — K64.9 HEMORRHOIDS, UNSPECIFIED HEMORRHOID TYPE: ICD-10-CM

## 2019-07-29 DIAGNOSIS — R61 SWEATING INCREASE: Primary | ICD-10-CM

## 2019-07-29 NOTE — TELEPHONE ENCOUNTER
PT IS REQUESTING A REFILL ON THE FOLLOWING:    (1) PROCTOFOAM - HC - RECTAL FOAM- 10 APPL  (2) TOPICAL POWDER FOR UNDER HER BREAST DO TO EXCESS SWEATING BECAUSE OF HEAT      PHARMACY: [facility]

## 2019-07-30 RX ORDER — NYSTATIN 100000 [USP'U]/G
POWDER TOPICAL 2 TIMES DAILY
Qty: 15 G | Refills: 1
Start: 2019-07-30 | End: 2020-02-06

## 2019-08-12 ENCOUNTER — OFFICE VISIT (OUTPATIENT)
Dept: GASTROENTEROLOGY | Facility: MEDICAL CENTER | Age: 59
End: 2019-08-12
Payer: COMMERCIAL

## 2019-08-12 VITALS
SYSTOLIC BLOOD PRESSURE: 118 MMHG | HEART RATE: 60 BPM | DIASTOLIC BLOOD PRESSURE: 60 MMHG | WEIGHT: 193 LBS | TEMPERATURE: 97.8 F | BODY MASS INDEX: 34.2 KG/M2 | HEIGHT: 63 IN

## 2019-08-12 DIAGNOSIS — R10.84 GENERALIZED ABDOMINAL PAIN: ICD-10-CM

## 2019-08-12 DIAGNOSIS — K25.3 ACUTE GASTRIC ULCER WITHOUT HEMORRHAGE OR PERFORATION: Primary | ICD-10-CM

## 2019-08-12 DIAGNOSIS — E66.01 CLASS 3 SEVERE OBESITY IN ADULT, UNSPECIFIED BMI, UNSPECIFIED OBESITY TYPE, UNSPECIFIED WHETHER SERIOUS COMORBIDITY PRESENT (HCC): ICD-10-CM

## 2019-08-12 DIAGNOSIS — Z12.11 COLON CANCER SCREENING: ICD-10-CM

## 2019-08-12 DIAGNOSIS — K21.9 GASTROESOPHAGEAL REFLUX DISEASE WITHOUT ESOPHAGITIS: ICD-10-CM

## 2019-08-12 PROCEDURE — 99244 OFF/OP CNSLTJ NEW/EST MOD 40: CPT | Performed by: INTERNAL MEDICINE

## 2019-08-12 RX ORDER — OMEPRAZOLE 40 MG/1
40 CAPSULE, DELAYED RELEASE ORAL 2 TIMES DAILY
Qty: 60 CAPSULE | Refills: 2 | Status: SHIPPED | OUTPATIENT
Start: 2019-08-12 | End: 2019-12-16 | Stop reason: SDUPTHER

## 2019-08-12 RX ORDER — CELECOXIB 100 MG/1
100 CAPSULE ORAL AS NEEDED
COMMUNITY
End: 2020-04-09

## 2019-08-12 RX ORDER — RANITIDINE 300 MG/1
300 TABLET ORAL
COMMUNITY
End: 2020-01-22

## 2019-08-12 RX ORDER — ONDANSETRON HYDROCHLORIDE 8 MG/1
8 TABLET, FILM COATED ORAL EVERY 8 HOURS PRN
Qty: 60 TABLET | Refills: 0 | Status: SHIPPED | OUTPATIENT
Start: 2019-08-12 | End: 2019-09-06 | Stop reason: SDUPTHER

## 2019-08-12 NOTE — ASSESSMENT & PLAN NOTE
She has had multiple gastric ulcers on her last endoscopy which are likely related to NSAID use since her H pylori was negative  Her symptoms for the reflux are controlled on  But over the past few weeks have been getting worse again  I recommend for her to continue with the [employer] however her cost prohibits it  I would recommend finishing off the Dexilant then changing to the Prevacid which she also has left over and taking it twice daily  After that she may take omeprazole 40 mg twice daily  I have prescribed that  She can also take Zantac at night  I strongly encouraged her to control her weight and lose the extra 20 lb that she gain over the past few months  Diet and exercise recommendations were made and literature was provided  She will also need a repeat endoscopy 3 months after the initial 1 to document healing of the gastric ulcers

## 2019-08-12 NOTE — PATIENT INSTRUCTIONS
1  Carafate as needed  May substitute for the Gaviscon or maalox  2  Gas - X or phazyme twice daily  3  Dexilant daily in the morning half hour before you eat  When you have ran out of the [facility] then you can take Prevacid 30 mg twice daily  When you have ran out of the Prevacid you can take omeprazole 40 mg twice daily  4  Take Zantac 300mg at night  5  Repeat EGD  6  Weight loss/ exercise  7  Do not eat processed foods  EGD scheduled on 10/9/2019 with Dr Allyssa Conrad at [facility]  Instructions given to patient

## 2019-08-12 NOTE — PROGRESS NOTES
Outpatient Consultation  DAVID Elizondo  Ph : [phone]  Fax : [phone]  Mobile : [phone]  Email : [email or web address]  Also available on [employer]      Cory Osler 62 y o  female MRN: [id number]    PCP: Matthieu Richard DO  Referring: Fran Huston MD  [address] [address]     Cory Osler was seen in consultation  My recommendations are included  Please do not hesitate to contact me with any questions you may have  ASSESSMENT AND PLAN:      Acute gastric ulcer without hemorrhage or perforation  She has had multiple gastric ulcers on her last endoscopy which are likely related to NSAID use since her H pylori was negative  Her symptoms for the reflux are controlled on  But over the past few weeks have been getting worse again  I recommend for her to continue with the [address] [place] [address] Avenue however her cost prohibits it  I would recommend finishing off the Dexilant then changing to the Prevacid which she also has left over and taking it twice daily  After that she may take omeprazole 40 mg twice daily  I have prescribed that  She can also take Zantac at night  I strongly encouraged her to control her weight and lose the extra 20 lb that she gain over the past few months  Diet and exercise recommendations were made and literature was provided  She will also need a repeat endoscopy 3 months after the initial 1 to document healing of the gastric ulcers  Gastroesophageal reflux disease without esophagitis  As above in gastric ulcer  She will also need a repeat endoscopy 3 months after the initial 1 to document healing of the gastric ulcers  Obesity  Recommend referral to nutrition therapy  Diagnoses and all orders for this visit:    Acute gastric ulcer without hemorrhage or perforation  -     EGD;  Future    Colon cancer screening  - Ambulatory referral to Gastroenterology    Class 3 severe obesity in adult, unspecified BMI, unspecified obesity type, unspecified whether serious comorbidity present [facility])  -     Ambulatory referral to Nutrition Services; Future    Gastroesophageal reflux disease without esophagitis  -     omeprazole (PriLOSEC) 40 MG capsule; Take 1 capsule (40 mg total) by mouth 2 (two) times a day for 30 days  -     Ambulatory referral to Nutrition Services; Future    Other orders  -     ranitidine (ZANTAC) 300 MG tablet; Take 300 mg by mouth daily at bedtime  -     celecoxib (CeleBREX) 100 mg capsule; Take 100 mg by mouth 2 (two) times a day  -     Diet NPO; Sips with meds; Standing  -     Void on call to OR; Standing  -     Insert peripheral IV; Standing        ______________________________________________________________________    HPI:    Edy Ramos is a 58-year-old lady who presents to us for evaluation of gastritis/gastric ulcers  She had an endoscopy done in June which showed antral ulcers  She was started on Dexilant and her symptoms did improve initially but now are getting worse  She has suffered from reflux for several years  She has tried various PPIs including Prevacid and Protonix as well as Zantac however without much benefit over the past 6 months  Of note is that in the past 6 months she had a lot of stress from work and her weight has increased by 20 lb  Also of note is that at the time that she was having her endoscopy done she was also on Mobic 3 times daily and that has since been changed to Celebrex  She is also Carafate and that helps  She is on aspirin 81 mg  Her symptoms are usually heartburn and nocturnal symptoms  She has no dysphagia  Regular BM  PRIOR ENDOSCOPIC EVALUATION :     Endoscopy : yes    Colonoscopy : yes - 2019 repeat recommended in 5 years  REVIEW OF SYSTEMS:    CONSTITUTIONAL: Denies any fever, chills, rigors, and weight loss  HEENT: No earache or tinnitus  Denies hearing loss or visual disturbances  CARDIOVASCULAR: No chest pain or palpitations  RESPIRATORY: Denies any cough, hemoptysis, shortness of breath or dyspnea on exertion  GASTROINTESTINAL: As noted in the History of Present Illness  GENITOURINARY: No problems with urination  Denies any hematuria or dysuria  NEUROLOGIC: No dizziness or vertigo, denies headaches  MUSCULOSKELETAL: Denies any muscle or joint pain  SKIN: Denies skin rashes or itching  ENDOCRINE: Denies excessive thirst  Denies intolerance to heat or cold  PSYCHOSOCIAL: Denies depression or anxiety  Denies any recent memory loss         Historical Information   Past Medical History:   Diagnosis Date    Colon polyp     Hyperlipidemia     Hypertension     Migraine     Psychiatric disorder      Past Surgical History:   Procedure Laterality Date    COLONOSCOPY      EXPLORATORY LAPAROTOMY      for infertility    HAND SURGERY      Hand excision of tendon cyst     Social History   Social History     Substance and Sexual Activity   Alcohol Use No     Social History     Substance and Sexual Activity   Drug Use No     Social History     Tobacco Use   Smoking Status Former Smoker    Packs/day: 0 50    Years: 10 00    Pack years: 5 00   Smokeless Tobacco Never Used     Family History   Problem Relation Age of Onset    Lung cancer Mother     Diabetes Father     Diabetes Other        Meds/Allergies       Current Outpatient Medications:     celecoxib (CeleBREX) 100 mg capsule    ranitidine (ZANTAC) 300 MG tablet    aspirin 81 mg chewable tablet    buPROPion (WELLBUTRIN XL) 150 mg 24 hr tablet    busPIRone (BUSPAR) 15 mg tablet    dexlansoprazole (DEXILANT) 60 MG capsule    dexlansoprazole (DEXILANT) 60 MG capsule    econazole nitrate 1 % cream    fluticasone (FLONASE) 50 mcg/act nasal spray    fluticasone-salmeterol (ADVAIR DISKUS) 250-50 mcg/dose inhaler    fluticasone-vilanterol (BREO ELLIPTA) 200-25 MCG/INH inhaler   hydrochlorothiazide (HYDRODIURIL) 25 mg tablet    hydrocortisone-pramoxine (PROCTOFOAM-HC) rectal foam    metoprolol succinate (TOPROL-XL) 100 mg 24 hr tablet    MULTIPLE VITAMIN PO    nystatin (MYCOSTATIN) powder    omeprazole (PriLOSEC) 40 MG capsule    ondansetron (ZOFRAN) 8 mg tablet    sucralfate (CARAFATE) 1 g tablet    valsartan (DIOVAN) 160 mg tablet    Allergies   Allergen Reactions    Ace Inhibitors Anaphylaxis     Anaphylaxis    Beta Adrenergic Blockers Anaphylaxis     Anaphylaxis  Anaphylaxis    Amlodipine Angioedema    Atorvastatin Other (See Comments)     "throat closing up"    Barium Sulfate Other (See Comments)     hives throat closing    Other Other (See Comments)     Other reaction(s): angioadema  Other reaction(s): Other (See Comments)  Preservatives- itching throat closes, hi  Other reaction(s): angioadema  E Z Cat - hives throat closes itching,  Preservatives- itching throat closes, hi    Sulfa Antibiotics Other (See Comments)     stuffiness,itching,hives,throat closing           Objective     Blood pressure 118/60, pulse 60, temperature 97 8 °F (36 6 °C), temperature source Tympanic, height 5' 3" (1 6 m), weight 87 5 kg (193 lb), not currently breastfeeding  Body mass index is 34 19 kg/m²  PHYSICAL EXAM:      Physical Exam   Constitutional: She is oriented to person, place, and time  Vital signs are normal  She appears well-developed and well-nourished  Nutrition   HENT:   Head: Normocephalic and atraumatic  Eyes: Pupils are equal, round, and reactive to light  Conjunctivae are normal  No scleral icterus  Neck: Normal range of motion  Cardiovascular: Normal rate, regular rhythm and normal heart sounds  Pulmonary/Chest: Effort normal and breath sounds normal  No respiratory distress  Abdominal: Soft  Normal appearance and bowel sounds are normal  She exhibits no distension, no ascites and no mass  There is no hepatosplenomegaly  There is no tenderness  No hernia  Musculoskeletal: Normal range of motion  Lymphadenopathy:     She has no cervical adenopathy  Neurological: She is alert and oriented to person, place, and time  Skin: Skin is warm  Psychiatric: She has a normal mood and affect  Her behavior is normal  Thought content normal            Lab Results:     Lab Results   Component Value Date    WBC 5 19 05/14/2019    HGB 12 2 05/14/2019    HCT 36 6 05/14/2019    MCV 95 05/14/2019     05/14/2019       Lab Results   Component Value Date     11/13/2017    K 4 5 05/14/2019     05/14/2019    CO2 28 05/14/2019    BUN 28 (H) 05/14/2019    CREATININE 1 22 05/14/2019    GLUF 113 (H) 05/14/2019    CALCIUM 9 9 05/14/2019    AST 29 05/14/2019    ALT 44 05/14/2019    ALKPHOS 81 05/14/2019    PROT 7 0 11/13/2017    BILITOT 0 3 11/13/2017    EGFR 48 05/14/2019       No results found for: INR, PROTIME      Radiology Results:   No results found

## 2019-08-12 NOTE — ASSESSMENT & PLAN NOTE
As above in gastric ulcer  She will also need a repeat endoscopy 3 months after the initial 1 to document healing of the gastric ulcers

## 2019-08-13 DIAGNOSIS — K21.9 GASTROESOPHAGEAL REFLUX DISEASE WITHOUT ESOPHAGITIS: Primary | ICD-10-CM

## 2019-08-13 RX ORDER — LANSOPRAZOLE 15 MG/1
CAPSULE, DELAYED RELEASE ORAL
Qty: 30 CAPSULE | Refills: 0 | Status: SHIPPED | OUTPATIENT
Start: 2019-08-13 | End: 2019-11-27

## 2019-08-14 ENCOUNTER — OFFICE VISIT (OUTPATIENT)
Dept: CARDIOLOGY CLINIC | Facility: CLINIC | Age: 59
End: 2019-08-14
Payer: COMMERCIAL

## 2019-08-14 ENCOUNTER — OFFICE VISIT (OUTPATIENT)
Dept: FAMILY MEDICINE CLINIC | Facility: CLINIC | Age: 59
End: 2019-08-14
Payer: COMMERCIAL

## 2019-08-14 VITALS
HEIGHT: 63 IN | SYSTOLIC BLOOD PRESSURE: 104 MMHG | WEIGHT: 196 LBS | BODY MASS INDEX: 34.73 KG/M2 | DIASTOLIC BLOOD PRESSURE: 70 MMHG

## 2019-08-14 VITALS
OXYGEN SATURATION: 97 % | HEART RATE: 56 BPM | WEIGHT: 196 LBS | HEIGHT: 63 IN | BODY MASS INDEX: 34.73 KG/M2 | SYSTOLIC BLOOD PRESSURE: 120 MMHG | DIASTOLIC BLOOD PRESSURE: 60 MMHG

## 2019-08-14 DIAGNOSIS — E78.9 LIPID DISORDER: ICD-10-CM

## 2019-08-14 DIAGNOSIS — F32.A ANXIETY AND DEPRESSION: ICD-10-CM

## 2019-08-14 DIAGNOSIS — R00.2 PALPITATION: ICD-10-CM

## 2019-08-14 DIAGNOSIS — K21.9 GASTROESOPHAGEAL REFLUX DISEASE WITHOUT ESOPHAGITIS: Primary | ICD-10-CM

## 2019-08-14 DIAGNOSIS — I10 BENIGN ESSENTIAL HYPERTENSION: ICD-10-CM

## 2019-08-14 DIAGNOSIS — F41.9 ANXIETY AND DEPRESSION: ICD-10-CM

## 2019-08-14 DIAGNOSIS — I10 BENIGN ESSENTIAL HYPERTENSION: Primary | ICD-10-CM

## 2019-08-14 DIAGNOSIS — E78.2 MIXED HYPERLIPIDEMIA: ICD-10-CM

## 2019-08-14 PROCEDURE — 99214 OFFICE O/P EST MOD 30 MIN: CPT | Performed by: INTERNAL MEDICINE

## 2019-08-14 PROCEDURE — 3074F SYST BP LT 130 MM HG: CPT | Performed by: FAMILY MEDICINE

## 2019-08-14 PROCEDURE — 1036F TOBACCO NON-USER: CPT | Performed by: FAMILY MEDICINE

## 2019-08-14 PROCEDURE — 99214 OFFICE O/P EST MOD 30 MIN: CPT | Performed by: FAMILY MEDICINE

## 2019-08-14 PROCEDURE — 3008F BODY MASS INDEX DOCD: CPT | Performed by: FAMILY MEDICINE

## 2019-08-14 RX ORDER — ROSUVASTATIN CALCIUM 5 MG/1
5 TABLET, COATED ORAL DAILY
Qty: 30 TABLET | Refills: 3 | Status: SHIPPED | OUTPATIENT
Start: 2019-08-14 | End: 2020-01-22

## 2019-08-14 NOTE — ASSESSMENT & PLAN NOTE
Blood pressure is well controlled on current therapy with metoprolol succinate, valsartan and hydrochlorothiazide  - Patient is advised to continue current medical therapy  - Dietary and medical compliance are reinforced  - Advised  to report any concerning symptoms such as chest pain, shortness of breath, decline in exercise tolerance or presyncope/syncope  - will need periodic monitoring of renal function

## 2019-08-14 NOTE — PROGRESS NOTES
[facility]  [address] [facility], Cely Ruiz  Phone#  [phone]  Fax#  [phone]  *-*-*-*-*-*-*-*-*-*-*-*-*-*-*-*-*-*-*-*-*-*-*-*-*-*-*-*-*-*-*-*-*-*-*-*-*-*-*-*-*-*-*-*-*-*-*-*-*-*-*-*-*-*  ENCOUNTER DATE: 08/14/19 4:47 PM  PATIENT NAME: Coty Burleson   1960    [id number]  Age: 62 y o  Sex: female  AUTHOR: Ji Aceves MD  PRIMARYCARE PHYSICIAN: Laura Chaudhary DO    DIAGNOSES:  1  Longstanding history of hypertension  2  Mixed dyslipidemia  3  History of now resolved migraines with motor symptoms  4  History of exploratory laparotomy  5  History of mild obesity  6  History of depression and anxiety  7  History of chronic smoking  8  Frequent chest pain and palpitation    Echocardiogram 2018 showed normal left ventricular size and systolic function, grade 2 diastolic dysfunction, trace mitral valve regurgitation, mild-to-moderate aortic valve regurgitation, trace tricuspid valve regurgitation, borderline dilated aortic root  Compared to 2016 there was no significant change  CURRENT ECG:  No results found for this visit on 08/14/19  CARDIOLOGY ASSESSMENT & PLAN:  1  Benign essential hypertension     2  Palpitations and chest pain  Holter monitor - 48 hour    Stress test only, exercise   3  Lipid disorder  rosuvastatin (CRESTOR) 5 mg tablet    COMPREHENSIVE METABOLIC FGCIT(07)    Lipid panel     Benign essential hypertension  Blood pressure is well controlled on current therapy with metoprolol succinate, valsartan and hydrochlorothiazide  - Patient is advised to continue current medical therapy  - Dietary and medical compliance are reinforced  - Advised  to report any concerning symptoms such as chest pain, shortness of breath, decline in exercise tolerance or presyncope/syncope  - will need periodic monitoring of renal function  Palpitations and chest pain  I continues to have intermittent palpitations and chest pains    Chest pain is equivocal for angina, more likely secondary to GERD  - given her risk factors will arrange for exercise treadmill stress test and 48 hour Holter monitor as these were not performed previously  - advising patient did keep a diary of symptoms and take the record for next follow-up with PCP and with us  Lipid disorder  Has significant mixed dyslipidemia with severely elevated total cholesterol, and mildly elevated triglycerides  Mentions that she could not tolerate Crestor 20 mg and Lipitor in the past   Has no history of atherosclerotic cardiovascular disease besides having hypertension     - I am advising her to go back on taking Crestor at reduced dose of 5 mg daily  If she is having symptom then she can change the frequency to 5 mg every other day  If she continues to have concerning symptoms for myositis and myopathy then will have to evaluate for statin intolerance versus next Nacebo effect  If she is not able to tolerate then will consider PCSK9 inhibitor therapy  Meanwhile she can also consider concurrent nonpharmacologic measures to improve her lipid profile  Some of these measures are outlined below  Nutraceutical supplements that may further lower blood cholesterol:    1  Berberine (active daily doses, 500-1500 mg; expected LDL-C reduction of 15%-20%); 2  Plant sterols and stanols (active daily doses, 400-3000 mg; expected LDL-C  reduction of 8%-12%);   3  Soluble fibers including beta-glucan, psyllium, and glucomannan (active  daily doses, 5-15 g; expected LDL-C reduction of 2%-91%);   4  Garlic (active daily doses, 5-6 g; expected LDL-C reduction of 5%-10%);  5  Green tea extracts (active daily doses,  g; expected LDL-C reduction of 5%)    Source:   Rafa SANCHEZ, Colletti A, Homer G, et al  Lipid  lowering nutraceuticals in clinical practice: position  paper from an International Lipid Expert Panel  Arch Med Sci 2017;13:965-1005          INTERVAL HISTORY & HISTORY OF PRESENT ILLNESS:  Kai Topete is here for follow-up regarding her cardiac comorbidities which include:  Palpitations and chest pain  Patient is here for follow-up  Was seen by us in April 2019  She was referred to undergo a Holter monitor and exercise treadmill stress test however is not performed as she had lost her medications  She has now again have started receiving medical benefits  Since last visit she mentions that she continues to have on and off palpitations felt like racing of the heart occurrence of after eating  Also mentions of having abdominal pain  Has been diagnosed with stomach ulcers  Has continued to gain weight since quitting smoking  Does get short of breath with mild-to-moderate exertion  Denies any orthopnea, PND, pedal edema or other symptoms  REVIEW OF SYMPTOMS:    Positive for:  Episodic palpitations, exertional shortness of breath, chest pain  Also has abdominal pain and GERD symptoms  Negative for: All remaining as reviewed below and in HPI      SYSTEM SYMPTOMS REVIEWED:  General--weight change, fever, night sweats  Respiratory--cough, wheezing, shortness of breath, sputum production  Cardiovascular--chest pain, syncope, dyspnea on exertion, edema, decline in exercise tolerance, claudication   Gastrointestinal--persistent vomiting, diarrhea, abdominal distention, blood in stool   Muscular or skeletal--joint pain or swelling   Neurologic--headaches, syncope, abnormal movement  Hematologic--history of easy bruising and bleeding   Endocrine--thyroid enlargement, heat or cold intolerance, polyuria   Psychiatric--anxiety, depression     *-*-*-*-*-*-*-*-*-*-*-*-*-*-*-*-*-*-*-*-*-*-*-*-*-*-*-*-*-*-*-*-*-*-*-*-*-*-*-*-*-*-*-*-*-*-*-*-*-*-*-*-*-*-  VITAL SIGNS:  Vitals:    08/14/19 1558   BP: 120/60   BP Location: Right arm   Patient Position: Sitting   Cuff Size: Adult   Pulse: 56   SpO2: 97%   Weight: 88 9 kg (196 lb)   Height: 5' 3" (1 6 m) *-*-*-*-*-*-*-*-*-*-*-*-*-*-*-*-*-*-*-*-*-*-*-*-*-*-*-*-*-*-*-*-*-*-*-*-*-*-*-*-*-*-*-*-*-*-*-*-*-*-*-*-*-*-  PHYSICAL EXAM:  General Appearance:    Alert, cooperative, no distress, appears stated age, obese   Head, Eyes, ENT:    No obvious abnormality, moist mucous mebranes  Neck:   Supple, no carotid bruit or JVD   Back:     Symmetric, no curvature  Lungs:     Respirations unlabored  Clear to auscultation bilaterally,    Chest wall:    No tenderness or deformity   Heart:    Regular rate and rhythm, S1 and S2 normal, no murmur, rub  or gallop     Abdomen:     Soft, non-tender, No obvious masses, or organomegaly   Extremities:   Extremities normal, no cyanosis or edema    Skin:   Skin color, texture, turgor normal, no rashes or lesions     *-*-*-*-*-*-*-*-*-*-*-*-*-*-*-*-*-*-*-*-*-*-*-*-*-*-*-*-*-*-*-*-*-*-*-*-*-*-*-*-*-*-*-*-*-*-*-*-*-*-*-*-*-*-  CURRENT MEDICATION LIST:    Current Outpatient Medications:     aspirin 81 mg chewable tablet, Chew 1 tablet (81 mg total) daily, Disp: , Rfl: 0    buPROPion (WELLBUTRIN XL) 150 mg 24 hr tablet, TAKE ONE TABLET BY MOUTH TWICE DAILY , Disp: 60 tablet, Rfl: 0    busPIRone (BUSPAR) 15 mg tablet, Take 1 tablet (15 mg total) by mouth 4 (four) times a day, Disp: 120 tablet, Rfl: 3    celecoxib (CeleBREX) 100 mg capsule, Take 100 mg by mouth 2 (two) times a day, Disp: , Rfl:     dexlansoprazole (DEXILANT) 60 MG capsule, Take 1 capsule (60 mg total) by mouth daily, Disp: 30 capsule, Rfl: 5    econazole nitrate 1 % cream, Apply topically daily, Disp: 30 g, Rfl: 1    fluticasone (FLONASE) 50 mcg/act nasal spray, 1 spray into each nostril 2 (two) times a day, Disp: 1 Bottle, Rfl: 3    fluticasone-salmeterol (ADVAIR DISKUS) 250-50 mcg/dose inhaler, Inhale 1 puff 2 (two) times a day Rinse mouth after use , Disp: 1 Inhaler, Rfl: 5    hydrochlorothiazide (HYDRODIURIL) 25 mg tablet, Take 1 tablet (25 mg total) by mouth daily, Disp: 90 tablet, Rfl: 1   hydrocortisone-pramoxine (PROCTOFOAM-HC) rectal foam, Insert 1 applicator into the rectum 2 (two) times a day, Disp: 10 g, Rfl: 1    metoprolol succinate (TOPROL-XL) 100 mg 24 hr tablet, Take 1 tablet (100 mg total) by mouth 2 (two) times a day, Disp: 180 tablet, Rfl: 1    MULTIPLE VITAMIN PO, Take by mouth, Disp: , Rfl:     nystatin (MYCOSTATIN) powder, Apply topically 2 (two) times a day, Disp: 15 g, Rfl: 1    ondansetron (ZOFRAN) 8 mg tablet, Take 1 tablet (8 mg total) by mouth every 8 (eight) hours as needed for nausea or vomiting, Disp: 60 tablet, Rfl: 0    ranitidine (ZANTAC) 300 MG tablet, Take 300 mg by mouth daily at bedtime, Disp: , Rfl:     sucralfate (CARAFATE) 1 g tablet, Take 1 tablet (1 g total) by mouth 4 (four) times a day, Disp: 120 tablet, Rfl: 5    valsartan (DIOVAN) 160 mg tablet, Take 1 tablet (160 mg total) by mouth 2 (two) times a day, Disp: 180 tablet, Rfl: 1    fluticasone-vilanterol (BREO ELLIPTA) 200-25 MCG/INH inhaler, Inhale 1 puff daily Rinse mouth after use  , Disp: , Rfl:     lansoprazole (PREVACID) 15 mg capsule, TAKE ONE CAPSULE BY MOUTH ONCE DAILY (Patient not taking: Reported on 8/14/2019), Disp: 30 capsule, Rfl: 0    omeprazole (PriLOSEC) 40 MG capsule, Take 1 capsule (40 mg total) by mouth 2 (two) times a day for 30 days (Patient not taking: Reported on 8/14/2019), Disp: 60 capsule, Rfl: 2    rosuvastatin (CRESTOR) 5 mg tablet, Take 1 tablet (5 mg total) by mouth daily, Disp: 30 tablet, Rfl: 3    ALLERGIES:  Allergies   Allergen Reactions    Ace Inhibitors Anaphylaxis     Anaphylaxis    Beta Adrenergic Blockers Anaphylaxis     Anaphylaxis  Anaphylaxis    Amlodipine Angioedema    Atorvastatin Other (See Comments)     "throat closing up"    Barium Sulfate Other (See Comments)     hives throat closing    Other Other (See Comments)     Other reaction(s): angioadema  Other reaction(s):  Other (See Comments)  Preservatives- itching throat closes, hi  Other reaction(s): angioadema  E Z Cat - hives throat closes itching,  Preservatives- itching throat closes, hi    Sulfa Antibiotics Other (See Comments)     stuffiness,itching,hives,throat closing       *-*-*-*-*-*-*-*-*-*-*-*-*-*-*-*-*-*-*-*-*-*-*-*-*-*-*-*-*-*-*-*-*-*-*-*-*-*-*-*-*-*-*-*-*-*-*-*-*-*-*-*-*-*-  The LABORATORY DATA:  I have personally reviewed pertinent labs      Sodium   Date Value Ref Range Status   11/13/2017 139 135 - 146 mmol/L Final   11/05/2016 136 135 - 146 mmol/L Final     Potassium   Date Value Ref Range Status   05/14/2019 4 5 3 5 - 5 3 mmol/L Final   10/25/2018 4 1 3 5 - 5 3 mmol/L Final   10/19/2018 3 8 3 5 - 5 3 mmol/L Final   11/13/2017 4 0 3 5 - 5 3 mmol/L Final   11/05/2016 4 3 3 5 - 5 3 mmol/L Final     Chloride   Date Value Ref Range Status   05/14/2019 102 100 - 108 mmol/L Final   10/25/2018 102 100 - 108 mmol/L Final   10/19/2018 106 100 - 108 mmol/L Final   11/13/2017 104 98 - 110 mmol/L Final   11/05/2016 101 98 - 110 mmol/L Final     CO2   Date Value Ref Range Status   05/14/2019 28 21 - 32 mmol/L Final   10/25/2018 27 21 - 32 mmol/L Final   10/19/2018 24 21 - 32 mmol/L Final   11/13/2017 28 20 - 31 mmol/L Final   11/05/2016 29 20 - 31 mmol/L Final     BUN   Date Value Ref Range Status   05/14/2019 28 (H) 5 - 25 mg/dL Final   10/25/2018 17 5 - 25 mg/dL Final   10/19/2018 19 5 - 25 mg/dL Final   11/13/2017 24 7 - 25 mg/dL Final   11/05/2016 25 7 - 25 mg/dL Final     Creatinine   Date Value Ref Range Status   05/14/2019 1 22 0 60 - 1 30 mg/dL Final     Comment:     Standardized to IDMS reference method   10/25/2018 0 89 0 60 - 1 30 mg/dL Final     Comment:     Standardized to IDMS reference method   10/19/2018 0 76 0 60 - 1 30 mg/dL Final     Comment:     Standardized to IDMS reference method   11/13/2017 0 83 0 50 - 1 05 mg/dL Final     Comment:     Result Comment: For patients >52years of age, the reference limit  for Creatinine is approximately 13% higher for people  identified as -American  11/05/2016 0 86 0 50 - 1 05 mg/dL Final     Comment:     Result Comment: For patients >52years of age, the reference limit  for Creatinine is approximately 13% higher for people  identified as -American  eGFR   Date Value Ref Range Status   05/14/2019 48 ml/min/1 73sq m Final   10/25/2018 71 ml/min/1 73sq m Final   10/19/2018 86 ml/min/1 73sq m Final     Calcium   Date Value Ref Range Status   05/14/2019 9 9 8 3 - 10 1 mg/dL Final   10/25/2018 9 3 8 3 - 10 1 mg/dL Final   10/19/2018 8 7 8 3 - 10 1 mg/dL Final   11/13/2017 9 4 8 6 - 10 4 mg/dL Final   11/05/2016 9 4 8 6 - 10 4 mg/dL Final     AST   Date Value Ref Range Status   05/14/2019 29 5 - 45 U/L Final     Comment:       Specimen collection should occur prior to Sulfasalazine administration due to the potential for falsely depressed results  10/25/2018 12 5 - 45 U/L Final     Comment:       Specimen collection should occur prior to Sulfasalazine administration due to the potential for falsely depressed results  10/19/2018 25 5 - 45 U/L Final     Comment:     Slightly Hemolyzed; Results May be Affected  Specimen collection should occur prior to Sulfasalazine administration due to the potential for falsely depressed results  11/13/2017 13 10 - 35 U/L Final   11/05/2016 12 10 - 35 U/L Final     ALT   Date Value Ref Range Status   05/14/2019 44 12 - 78 U/L Final     Comment:       Specimen collection should occur prior to Sulfasalazine administration due to the potential for falsely depressed results  10/25/2018 21 12 - 78 U/L Final     Comment:       Specimen collection should occur prior to Sulfasalazine administration due to the potential for falsely depressed results  10/19/2018 22 12 - 78 U/L Final     Comment:       Specimen collection should occur prior to Sulfasalazine and/or Sulfapyridine administration due to the potential for falsely depressed results      11/13/2017 12 6 - 29 U/L Final     Comment: Performed at: [id number] [facility],[address], [place], [facility]  [address] [place], [place] [address]     11/05/2016 9 6 - 29 U/L Final     Comment:     Performed at: [id number] [facility] [place], [facility]  [address] [place], [place] [address]       Alkaline Phosphatase   Date Value Ref Range Status   05/14/2019 81 46 - 116 U/L Final   10/25/2018 85 46 - 116 U/L Final   10/19/2018 103 46 - 116 U/L Final   11/13/2017 75 33 - 130 U/L Final   11/05/2016 88 33 - 130 U/L Final     Total Protein   Date Value Ref Range Status   11/13/2017 7 0 6 1 - 8 1 g/dL Final   11/05/2016 6 7 6 1 - 8 1 g/dL Final     Total Bilirubin   Date Value Ref Range Status   11/13/2017 0 3 0 2 - 1 2 mg/dL Final   11/05/2016 0 4 0 2 - 1 2 mg/dL Final     Magnesium   Date Value Ref Range Status   10/19/2018 2 6 1 6 - 2 6 mg/dL Final     WBC   Date Value Ref Range Status   05/14/2019 5 19 4 31 - 10 16 Thousand/uL Final   10/25/2018 6 11 4 31 - 10 16 Thousand/uL Final   10/19/2018 4 81 4 31 - 10 16 Thousand/uL Final   11/13/2017 5 2 3 8 - 10 8 Thousand/uL Final   11/05/2016 4 1 3 8 - 10 8 Thousand/uL Final     Hemoglobin   Date Value Ref Range Status   05/14/2019 12 2 11 5 - 15 4 g/dL Final   10/25/2018 12 8 11 5 - 15 4 g/dL Final   10/19/2018 11 1 (L) 11 5 - 15 4 g/dL Final   11/13/2017 11 6 (L) 11 7 - 15 5 g/dL Final   11/05/2016 12 9 11 7 - 15 5 g/dL Final     Platelets   Date Value Ref Range Status   05/14/2019 262 149 - 390 Thousands/uL Final   10/25/2018 237 149 - 390 Thousands/uL Final   10/19/2018 212 149 - 390 Thousands/uL Final   11/13/2017 261 140 - 400 Thousand/uL Final   11/05/2016 217 140 - 400 Thousand/uL Final     No results found for: PT, PTT, INR  No results found for: CKMB, DIGOXIN  No results found for: TSH  Cholesterol   Date Value Ref Range Status   11/13/2017 413 (H) <200 mg/dL Final     HDL   Date Value Ref Range Status   11/13/2017 94 >50 mg/dL Final     HDL, Direct   Date Value Ref Range Status   05/14/2019 69 (H) 40 - 60 mg/dL Final     Comment:       HDL Cholesterol:       High    >60 mg/dL       Low     <41 mg/dL  Specimen collection should occur prior to Metamizole administration due to the potential for falsley depressed results  Triglycerides   Date Value Ref Range Status   05/14/2019 163 (H) <=150 mg/dL Final     Comment:       Triglyceride:     Normal          <150 mg/dl     Borderline High 150-199 mg/dl     High            200-499 mg/dl        Very High       >499 mg/dl    Specimen collection should occur prior to N-Acetylcysteine or Metamizole administration due to the potential for falsely depressed results  11/13/2017 159 (H) <150 mg/dL Final      Hemoglobin A1C   Date Value Ref Range Status   10/19/2018 5 4 4 2 - 6 3 % Final   11/13/2017 4 9 <5 7 % of total Hgb Final     Comment:     Result Comment: For the purpose of screening for the presence of  diabetes:     <5 7%       Consistent with the absence of diabetes  5 7-6 4%    Consistent with increased risk for diabetes              (prediabetes)  > or =6 5%  Consistent with diabetes     This assay result is consistent with a decreased risk  of diabetes  Currently, no consensus exists regarding use of  hemoglobin A1c for diagnosis of diabetes in children  According to American Diabetes Association (ADA)  guidelines, hemoglobin A1c <7 0% represents optimal  control in non-pregnant diabetic patients  Different  metrics may apply to specific patient populations  Standards of Medical Care in Diabetes(ADA)      Performed at: [id number] [facility],[address], [place], [address] [facility], [place] [address]        No results found for: Tor Kevin, GRAMSTAIN, URINECX, WOUNDCULT, BODYFLUIDCUL, MRSACULTURE, INFLUAPCR, INFLUBPCR, RSVPCR, LEGIONELLAUR, CDIFFTOXINB    *-*-*-*-*-*-*-*-*-*-*-*-*-*-*-*-*-*-*-*-*-*-*-*-*-*-*-*-*-*-*-*-*-*-*-*-*-*-*-*-*-*-*-*-*-*-*-*-*-*-*-*-*-*-  PREVIOUS CARDIOLOGY & RADIOLOGY RESULTS:  Results for orders placed during the hospital encounter of 10/18/18   Echo complete with contrast if indicated    Narrative [address]  [place], [address] [facility]  [phone]    Transthoracic Echocardiogram  2D, M-mode, Doppler, and Color Doppler    Study date:  19-Oct-2018    Patient: Tim Multani  MR number: [id number]  Account number: [de-identified]  : 1960  Age: 59 years  Gender: Female  Status: Outpatient  Location: Bedside  Height: 64 in  Weight: 172 lb  BP: 161/ 31 mmHg    Indications: Transient ischemic attack  Diagnoses: G45 9 - Transient cerebral ischemic attack, unspecified    Sonographer:  Nikunj Jenkins RDCS  Primary Physician:  Natalia Fairbanks MD  Referring Physician:  Loi Tam MD MPH  Group:  [facility]  Cardiology Fellow:  Terence Oliveros MD  Interpreting Physician:  Brenton Hayes MD    SUMMARY    LEFT VENTRICLE:  Size was at the upper limits of normal   Systolic function was normal  Ejection fraction was estimated to be 60 %  There were no regional wall motion abnormalities  Wall thickness was normal   Features were consistent with a pseudonormal left ventricular filling pattern, with concomitant abnormal relaxation and increased filling pressure (grade 2 diastolic dysfunction)  RIGHT VENTRICLE:  The size was normal   Systolic function was normal     MITRAL VALVE:  There was trace regurgitation  AORTIC VALVE:  There was no evidence for stenosis  There was mild to moderate regurgitation  TRICUSPID VALVE:  There was trace regurgitation  AORTA:  The root exhibited mild dilatation(36 mm)  COMPARISONS:  Comparison was made with the previous study of 2016  Mitral valve appearance has not changed  HISTORY: PRIOR HISTORY: Smoker, Hypertension, Hyperlipidemia  PROCEDURE: The procedure was performed at the bedside  This was a routine study   The transthoracic approach was used  The study included complete 2D imaging, M-mode, complete spectral Doppler, and color Doppler  The heart rate was 66 bpm,  at the start of the study  Images were obtained from the parasternal, apical, subcostal, and suprasternal notch acoustic windows  Image quality was adequate  LEFT VENTRICLE: Size was at the upper limits of normal  Systolic function was normal  Ejection fraction was estimated to be 60 %  There were no regional wall motion abnormalities  Wall thickness was normal  DOPPLER: Features were  consistent with a pseudonormal left ventricular filling pattern, with concomitant abnormal relaxation and increased filling pressure (grade 2 diastolic dysfunction)  RIGHT VENTRICLE: The size was normal  Systolic function was normal  Wall thickness was normal     LEFT ATRIUM: Size was normal     RIGHT ATRIUM: Size was normal     MITRAL VALVE: Valve structure was normal  There was normal leaflet separation  DOPPLER: The transmitral velocity was within the normal range  There was no evidence for stenosis  There was trace regurgitation  AORTIC VALVE: The valve was trileaflet  Leaflets exhibited normal thickness and normal cuspal separation  DOPPLER: Transaortic velocity was within the normal range  There was no evidence for stenosis  There was mild to moderate  regurgitation  TRICUSPID VALVE: The valve structure was normal  There was normal leaflet separation  DOPPLER: The transtricuspid velocity was within the normal range  There was no evidence for stenosis  There was trace regurgitation  The tricuspid jet  envelope definition was inadequate for estimation of RV systolic pressure  There are no indirect findings (abnormal RV volume or geometry, altered pulmonary flow velocity profile, or leftward septal displacement) which would suggest  moderate or severe pulmonary hypertension  PULMONIC VALVE: Leaflets exhibited normal thickness, no calcification, and normal cuspal separation   DOPPLER: The transpulmonic velocity was within the normal range  There was no significant regurgitation  PERICARDIUM: There was no pericardial effusion  The pericardium was normal in appearance  AORTA: The root exhibited mild dilatation(36 mm)  SYSTEMIC VEINS: IVC: The inferior vena cava was normal in size  SYSTEM MEASUREMENT TABLES    2D mode  AV Diam: 3 6 cm  LA Diam (2D): 3 8 cm  IVSd (2D): 0 99 cm  LVIDd (2D): 4 92 cm  LVIDs (2D): 3 13 cm  LVPWd (2D): 0 93 cm  RVIDd (2D): 2 86 cm    Apical four chamber  LVEF MOD A4C: 62 %  SV MOD A4C: 82 5 cm3    Tissue Doppler Imaging  LV Peak Early Frankel Tissue Johan; Medial MA (TDI): 65 3 mm/s    Unspecified Scan Mode  Dec [place]; Regurgitant Flow: 2030 mm/s2  PHT; Regurgitant Flow: 518 ms  Peak Grad; Mean; Regurgitant Flow: 54 mm[Hg]  Vmax; Mean; Regurgitant Flow: 3680 mm/s  Vmax; Regurgitant Flow: 3600 mm/s  MV Peak Johan/LV Peak Tissue Johan E-Wave; Medial MA: 15  DT; Antegrade Flow: 190 ms  DT; Mean; Antegrade Flow: 190 ms  MV E/A Ratio: 1  MV Peak A Johan: 999 mm/s  MV Peak E Johan; Mean; Antegrade Flow: 980 mm/s  Peak Grad; Mean; Regurgitant Flow: 20 mm[Hg]  Vmax; Mean; Regurgitant Flow: 2260 mm/s  Vmax; Regurgitant Flow: 2250 mm/s    [facility]    Prepared and electronically signed by    Jim Muñiz MD  Signed 19-Oct-2018 16:58:41       No results found for this or any previous visit  No results found for this or any previous visit  No results found for this or any previous visit  Nm Hepatobiliary W Rx    Result Date: 5/29/2019  Impression: 1  Normal hepatobiliary study   2  Normal contractile response of the gallbladder to cholecystokinin infusion   (84%) Workstation performed: [id number]        *-*-*-*-*-*-*-*-*-*-*-*-*-*-*-*-*-*-*-*-*-*-*-*-*-*-*-*-*-*-*-*-*-*-*-*-*-*-*-*-*-*-*-*-*-*-*-*-*-*-*-*-*-*-  SIGNATURES:   [unfilled]   Cici Lara MD *-*-*-*-*-*-*-*-*-*-*-*-*-*-*-*-*-*-*-*-*-*-*-*-*-*-*-*-*-*-*-*-*-*-*-*-*-*-*-*-*-*-*-*-*-*-*-*-*-*-*-*-*-*-    Social History     Socioeconomic History    Marital status:      Spouse name: Not on file    Number of children: Not on file    Years of education: Not on file    Highest education level: Not on file   Occupational History    Not on file   Social Needs    Financial resource strain: Not on file    Food insecurity:     Worry: Not on file     Inability: Not on file    Transportation needs:     Medical: Not on file     Non-medical: Not on file   Tobacco Use    Smoking status: Former Smoker     Packs/day: 0 50     Years: 10 00     Pack years: 5 00    Smokeless tobacco: Never Used   Substance and Sexual Activity    Alcohol use: No    Drug use: No    Sexual activity: Not on file   Lifestyle    Physical activity:     Days per week: Not on file     Minutes per session: Not on file    Stress: Not on file   Relationships    Social connections:     Talks on phone: Not on file     Gets together: Not on file     Attends [employer] service: Not on file     Active member of club or organization: Not on file     Attends meetings of clubs or organizations: Not on file     Relationship status: Not on file    Intimate partner violence:     Fear of current or ex partner: Not on file     Emotionally abused: Not on file     Physically abused: Not on file     Forced sexual activity: Not on file   Other Topics Concern    Not on file   Social History Narrative    Not on file      Family History   Problem Relation Age of Onset    Lung cancer Mother     Diabetes Father     Diabetes Other      Past Surgical History:   Procedure Laterality Date    COLONOSCOPY      EXPLORATORY LAPAROTOMY      for infertility    HAND SURGERY      Hand excision of tendon cyst

## 2019-08-14 NOTE — PROGRESS NOTES
Assessment/Plan:  Patient continue to follow with GI regarding GERD symptoms  Patient will continue with Zeeshan Mayer  Patient will follow with Cardiology today and get worked up for palpitations and chest pain shortness of breath etc   Blood pressure stable at this time  Patient will talk with cardiologist regarding hyperlipidemia  Patient did quit smoking  Patient will try to slowly wean down BuSpar as discussed with patient  Patient will continue with Wellbutrin  Patient will try vitamin B12 over-the-counter  Diagnoses and all orders for this visit:    Gastroesophageal reflux disease without esophagitis    Benign essential hypertension    Anxiety and depression    Mixed hyperlipidemia            Subjective:        Patient ID: Stephanie Benites is a 62 y o  female  Patient follow-up on anxiety, GERD, hypertension hyperlipidemia  Patient with new job over the past month or so  Patient may be getting another job in the near future  Patient does see GI and is still having some reflux symptoms  Patient also getting some palpitations as well as chest pain/shortness of breath  Patient will be seeing Cardiology today  The patient has some urinary leakage  Patient still with some anxiety issues  Patient is back on Dexilant  The following portions of the patient's history were reviewed and updated as appropriate: allergies, current medications, past family history, past medical history, past social history, past surgical history and problem list       Review of Systems   Constitutional: Negative  HENT: Negative  Eyes: Negative  Respiratory: Positive for chest tightness and shortness of breath  Cardiovascular: Positive for chest pain and palpitations  Gastrointestinal: Positive for abdominal pain  Reflux symptoms   Endocrine: Negative  Genitourinary: Negative  Musculoskeletal: Negative  Skin: Negative  Allergic/Immunologic: Negative  Neurological: Negative  Hematological: Negative  Psychiatric/Behavioral: Negative  Objective:      BMI Counseling: Body mass index is 34 72 kg/m²  Discussed the patient's BMI with her  The BMI is above average  BMI counseling and education was provided to the patient  Nutrition recommendations include reducing portion sizes  Depression Screening Follow-up Plan: Patient's depression screening was positive with a PHQ-2 score of   Their PHQ-9 score was   Patient assessed for underlying major depression  They have no active suicidal ideations  Brief counseling provided and recommend additional follow-up/re-evaluation next office visit  /70 (BP Location: Left arm, Patient Position: Sitting, Cuff Size: Large)   Ht 5' 3" (1 6 m)   Wt 88 9 kg (196 lb)   LMP  (LMP Unknown)   BMI 34 72 kg/m²          Physical Exam   Constitutional: She is oriented to person, place, and time  She appears well-developed and well-nourished  No distress  HENT:   Head: Normocephalic  Right Ear: External ear normal    Left Ear: External ear normal    Mouth/Throat: Oropharynx is clear and moist  No oropharyngeal exudate  Eyes: Pupils are equal, round, and reactive to light  EOM are normal  Right eye exhibits no discharge  Left eye exhibits no discharge  No scleral icterus  Neck: Normal range of motion  Neck supple  No thyromegaly present  Cardiovascular: Normal rate, regular rhythm, normal heart sounds and intact distal pulses  Exam reveals no gallop and no friction rub  No murmur heard  Pulmonary/Chest: Effort normal and breath sounds normal  No respiratory distress  She has no wheezes  She has no rales  She exhibits no tenderness  Abdominal: Soft  Bowel sounds are normal  She exhibits no distension  There is no tenderness  There is no rebound and no guarding  Musculoskeletal: Normal range of motion  She exhibits no edema or tenderness  Lymphadenopathy:     She has no cervical adenopathy     Neurological: She is oriented to person, place, and time  No cranial nerve deficit  She exhibits normal muscle tone  Coordination normal    Skin: Skin is warm and dry  No rash noted  She is not diaphoretic  No erythema  No pallor  Psychiatric: She has a normal mood and affect   Her behavior is normal  Judgment and thought content normal

## 2019-08-14 NOTE — PATIENT INSTRUCTIONS
CARDIOLOGY ASSESSMENT & PLAN:  1  Benign essential hypertension     2  Palpitations and chest pain  Holter monitor - 48 hour    Stress test only, exercise   3  Lipid disorder  rosuvastatin (CRESTOR) 5 mg tablet    COMPREHENSIVE METABOLIC [facility]([id number])    Lipid panel     Benign essential hypertension  Blood pressure is well controlled on current therapy with metoprolol succinate, valsartan and hydrochlorothiazide  - Patient is advised to continue current medical therapy  - Dietary and medical compliance are reinforced  - Advised  to report any concerning symptoms such as chest pain, shortness of breath, decline in exercise tolerance or presyncope/syncope  - will need periodic monitoring of renal function  Palpitations and chest pain  I continues to have intermittent palpitations and chest pains  Chest pain is equivocal for angina, more likely secondary to GERD  - given her risk factors will arrange for exercise treadmill stress test and 48 hour Holter monitor as these were not performed previously  - advising patient did keep a diary of symptoms and take the record for next follow-up with PCP and with us  Lipid disorder  Has significant mixed dyslipidemia with severely elevated total cholesterol, and mildly elevated triglycerides  Mentions that she could not tolerate Crestor 20 mg and Lipitor in the past   Has no history of atherosclerotic cardiovascular disease besides having hypertension     - I am advising her to go back on taking Crestor at reduced dose of 5 mg daily  If she is having symptom then she can change the frequency to 5 mg every other day  If she continues to have concerning symptoms for myositis and myopathy then will have to evaluate for statin intolerance versus next Nacebo effect  If she is not able to tolerate then will consider PCSK9 inhibitor therapy  Meanwhile she can also consider concurrent nonpharmacologic measures to improve her lipid profile    Some of these measures are outlined below  Nutraceutical supplements that may further lower blood cholesterol:    1  Berberine (active daily doses, 500-1500 mg; expected LDL-C reduction of 15%-20%); 2  Plant sterols and stanols (active daily doses, 400-3000 mg; expected LDL-C  reduction of 8%-12%);   3  Soluble fibers including beta-glucan, psyllium, and glucomannan (active  daily doses, 5-15 g; expected LDL-C reduction of 2%-95%);   4  Garlic (active daily doses, 5-6 g; expected LDL-C reduction of 5%-10%);  5  Green tea extracts (active daily doses,  g; expected LDL-C reduction of 5%)    Source:   Rafa SANCHEZ, Colletti A, Homer G, et al  Lipid  lowering nutraceuticals in clinical practice: position  paper from an International Lipid Expert Panel  Arch Med Sci 2017;13:965-1005  DIAGNOSES:  1  Longstanding history of hypertension  2  Mixed dyslipidemia  3  History of now resolved migraines with motor symptoms  4  History of exploratory laparotomy  5  History of mild obesity  6  History of depression and anxiety  7  History of chronic smoking  8  Frequent chest pain and palpitation    Echocardiogram 2018 showed normal left ventricular size and systolic function, grade 2 diastolic dysfunction, trace mitral valve regurgitation, mild-to-moderate aortic valve regurgitation, trace tricuspid valve regurgitation, borderline dilated aortic root  Compared to 2016 there was no significant change

## 2019-08-14 NOTE — ASSESSMENT & PLAN NOTE
Has significant mixed dyslipidemia with severely elevated total cholesterol, and mildly elevated triglycerides  Mentions that she could not tolerate Crestor 20 mg and Lipitor in the past   Has no history of atherosclerotic cardiovascular disease besides having hypertension     - I am advising her to go back on taking Crestor at reduced dose of 5 mg daily  If she is having symptom then she can change the frequency to 5 mg every other day  If she continues to have concerning symptoms for myositis and myopathy then will have to evaluate for statin intolerance versus next Nacebo effect  If she is not able to tolerate then will consider PCSK9 inhibitor therapy  Meanwhile she can also consider concurrent nonpharmacologic measures to improve her lipid profile  Some of these measures are outlined below  Nutraceutical supplements that may further lower blood cholesterol:    1  Berberine (active daily doses, 500-1500 mg; expected LDL-C reduction of 15%-20%); 2  Plant sterols and stanols (active daily doses, 400-3000 mg; expected LDL-C  reduction of 8%-12%);   3  Soluble fibers including beta-glucan, psyllium, and glucomannan (active  daily doses, 5-15 g; expected LDL-C reduction of 7%-02%);   4  Garlic (active daily doses, 5-6 g; expected LDL-C reduction of 5%-10%);  5  Green tea extracts (active daily doses,  g; expected LDL-C reduction of 5%)    Source:   Rafa SANCHEZ, Colletti A, Homer G, et al  Lipid  lowering nutraceuticals in clinical practice: position  paper from an International Lipid Expert Panel  Arch Med Sci 2017;13:965-1005

## 2019-08-14 NOTE — ASSESSMENT & PLAN NOTE
I continues to have intermittent palpitations and chest pains  Chest pain is equivocal for angina, more likely secondary to GERD  - given her risk factors will arrange for exercise treadmill stress test and 48 hour Holter monitor as these were not performed previously  - advising patient did keep a diary of symptoms and take the record for next follow-up with PCP and with us

## 2019-08-21 ENCOUNTER — TELEPHONE (OUTPATIENT)
Dept: GASTROENTEROLOGY | Facility: MEDICAL CENTER | Age: 59
End: 2019-08-21

## 2019-08-21 NOTE — TELEPHONE ENCOUNTER
Hello pantient called in because she was reading up online as to why she might be having so many yeast infections all over her body  She stated that she thinks she might have the Candidiasis infection in her stomach and would like to have the saliva test that she read about done that will determine if she does  Please call the patient best contact number is [phone]   Patient is scheduled for an EGD in October

## 2019-09-06 DIAGNOSIS — R10.84 GENERALIZED ABDOMINAL PAIN: ICD-10-CM

## 2019-09-06 RX ORDER — ONDANSETRON HYDROCHLORIDE 8 MG/1
8 TABLET, FILM COATED ORAL EVERY 8 HOURS PRN
Qty: 60 TABLET | Refills: 0 | Status: SHIPPED | OUTPATIENT
Start: 2019-09-06 | End: 2019-11-27

## 2019-09-23 DIAGNOSIS — I10 ESSENTIAL HYPERTENSION: ICD-10-CM

## 2019-09-23 DIAGNOSIS — F32.A ANXIETY AND DEPRESSION: ICD-10-CM

## 2019-09-23 DIAGNOSIS — F41.9 ANXIETY AND DEPRESSION: ICD-10-CM

## 2019-09-23 RX ORDER — HYDROCHLOROTHIAZIDE 25 MG/1
TABLET ORAL
Qty: 30 TABLET | Refills: 0 | Status: SHIPPED | OUTPATIENT
Start: 2019-09-23 | End: 2019-11-27

## 2019-09-23 RX ORDER — BUPROPION HYDROCHLORIDE 150 MG/1
150 TABLET ORAL 2 TIMES DAILY
Qty: 60 TABLET | Refills: 0 | Status: SHIPPED | OUTPATIENT
Start: 2019-09-23 | End: 2019-10-17 | Stop reason: SDUPTHER

## 2019-09-27 DIAGNOSIS — I10 ESSENTIAL HYPERTENSION: ICD-10-CM

## 2019-09-27 RX ORDER — METOPROLOL SUCCINATE 100 MG/1
100 TABLET, EXTENDED RELEASE ORAL 2 TIMES DAILY
Qty: 180 TABLET | Refills: 1 | Status: SHIPPED | OUTPATIENT
Start: 2019-09-27 | End: 2020-02-06 | Stop reason: SDUPTHER

## 2019-10-02 DIAGNOSIS — I10 HYPERTENSION, UNSPECIFIED TYPE: ICD-10-CM

## 2019-10-02 RX ORDER — VALSARTAN 160 MG/1
TABLET ORAL
Qty: 60 TABLET | Refills: 0 | Status: SHIPPED | OUTPATIENT
Start: 2019-10-02 | End: 2019-11-13 | Stop reason: SDUPTHER

## 2019-10-08 RX ORDER — SODIUM CHLORIDE 9 MG/ML
125 INJECTION, SOLUTION INTRAVENOUS CONTINUOUS
Status: CANCELLED | OUTPATIENT
Start: 2019-10-08

## 2019-10-09 ENCOUNTER — HOSPITAL ENCOUNTER (OUTPATIENT)
Dept: GASTROENTEROLOGY | Facility: MEDICAL CENTER | Age: 59
Setting detail: OUTPATIENT SURGERY
Discharge: HOME | End: 2019-10-09
Attending: INTERNAL MEDICINE

## 2019-10-17 DIAGNOSIS — F41.9 ANXIETY AND DEPRESSION: ICD-10-CM

## 2019-10-17 DIAGNOSIS — F32.A ANXIETY AND DEPRESSION: ICD-10-CM

## 2019-10-17 RX ORDER — BUPROPION HYDROCHLORIDE 150 MG/1
TABLET ORAL
Qty: 60 TABLET | Refills: 0 | Status: SHIPPED | OUTPATIENT
Start: 2019-10-17 | End: 2019-11-13 | Stop reason: SDUPTHER

## 2019-11-12 DIAGNOSIS — Z12.39 SCREENING FOR BREAST CANCER: Primary | ICD-10-CM

## 2019-11-13 DIAGNOSIS — J30.1 SEASONAL ALLERGIC RHINITIS DUE TO POLLEN: ICD-10-CM

## 2019-11-13 DIAGNOSIS — I10 HYPERTENSION, UNSPECIFIED TYPE: ICD-10-CM

## 2019-11-13 DIAGNOSIS — F32.A ANXIETY AND DEPRESSION: ICD-10-CM

## 2019-11-13 DIAGNOSIS — F41.9 ANXIETY AND DEPRESSION: ICD-10-CM

## 2019-11-13 RX ORDER — VALSARTAN 160 MG/1
160 TABLET ORAL 2 TIMES DAILY
Qty: 60 TABLET | Refills: 0 | Status: SHIPPED | OUTPATIENT
Start: 2019-11-13 | End: 2019-12-06 | Stop reason: SDUPTHER

## 2019-11-13 RX ORDER — FLUTICASONE PROPIONATE 50 MCG
1 SPRAY, SUSPENSION (ML) NASAL 2 TIMES DAILY
Qty: 1 BOTTLE | Refills: 3 | Status: SHIPPED | OUTPATIENT
Start: 2019-11-13 | End: 2020-04-09 | Stop reason: SDUPTHER

## 2019-11-13 RX ORDER — BUPROPION HYDROCHLORIDE 150 MG/1
150 TABLET ORAL 2 TIMES DAILY
Qty: 60 TABLET | Refills: 0 | Status: SHIPPED | OUTPATIENT
Start: 2019-11-13 | End: 2019-12-06 | Stop reason: SDUPTHER

## 2019-11-13 NOTE — TELEPHONE ENCOUNTER
Patient called in today for refills of Wellbutrin 1590 mg, Valsartan 160 mg and Flonase nasal spray

## 2019-11-27 ENCOUNTER — OFFICE VISIT (OUTPATIENT)
Dept: FAMILY MEDICINE CLINIC | Facility: CLINIC | Age: 59
End: 2019-11-27
Payer: COMMERCIAL

## 2019-11-27 VITALS
DIASTOLIC BLOOD PRESSURE: 82 MMHG | BODY MASS INDEX: 36.14 KG/M2 | HEIGHT: 63 IN | WEIGHT: 204 LBS | HEART RATE: 58 BPM | OXYGEN SATURATION: 99 % | SYSTOLIC BLOOD PRESSURE: 120 MMHG | TEMPERATURE: 97.4 F

## 2019-11-27 DIAGNOSIS — F41.9 ANXIETY: ICD-10-CM

## 2019-11-27 DIAGNOSIS — R00.2 PALPITATION: Primary | ICD-10-CM

## 2019-11-27 DIAGNOSIS — Z12.39 SCREENING FOR BREAST CANCER: ICD-10-CM

## 2019-11-27 PROCEDURE — 99213 OFFICE O/P EST LOW 20 MIN: CPT | Performed by: FAMILY MEDICINE

## 2019-11-27 RX ORDER — LORAZEPAM 0.5 MG/1
0.5 TABLET ORAL 2 TIMES DAILY
Qty: 30 TABLET | Refills: 0 | Status: SHIPPED | OUTPATIENT
Start: 2019-11-27 | End: 2019-12-16 | Stop reason: SDUPTHER

## 2019-11-27 NOTE — PROGRESS NOTES
Assessment/Plan:  Patient will try Ativan as directed to see if this resolves palpitations  Patient without insurance presently  Further workup when patient gets insurance or if symptoms would worsen  Follow-up as needed       Diagnoses and all orders for this visit:    Palpitations and chest pain  -     LORazepam (ATIVAN) 0 5 mg tablet; Take 1 tablet (0 5 mg total) by mouth 2 (two) times a day    Screening for breast cancer  -     Mammo screening bilateral w cad; Future    Anxiety  -     LORazepam (ATIVAN) 0 5 mg tablet; Take 1 tablet (0 5 mg total) by mouth 2 (two) times a day            Subjective:        Patient ID: Jenniffer Capps is a 62 y o  female  Patient is here with some palpitations ever since starting BuSpar  Patient's palpitations worsen with increasing doses abuse by  Patient is weaned off for roughly 1 week  Frequency of palpitations habits decreased  No chest pain  Patient without insurance  Patient is trying to get insurance in the near future  Patient is off HCTZ  Patient is using valsartan half tablet twice daily  The following portions of the patient's history were reviewed and updated as appropriate: allergies, current medications, past family history, past medical history, past social history, past surgical history and problem list       Review of Systems   Constitutional: Negative  HENT: Negative  Eyes: Negative  Respiratory: Positive for shortness of breath  Cardiovascular: Positive for palpitations  Gastrointestinal: Negative  Endocrine: Negative  Genitourinary: Negative  Musculoskeletal: Negative  Skin: Negative  Allergic/Immunologic: Negative  Neurological: Negative  Hematological: Negative  Psychiatric/Behavioral: Negative              Objective:               /82 (BP Location: Right arm, Patient Position: Sitting, Cuff Size: Adult)   Pulse 58   Temp (!) 97 4 °F (36 3 °C) (Tympanic)   Ht 5' 3" (1 6 m)   Wt 92 5 kg (204 lb)   LMP  (LMP Unknown)   SpO2 99%   BMI 36 14 kg/m²          Physical Exam   Constitutional: She appears well-developed and well-nourished  No distress  HENT:   Head: Normocephalic  Right Ear: External ear normal    Left Ear: External ear normal    Mouth/Throat: Oropharynx is clear and moist  No oropharyngeal exudate  Eyes: Pupils are equal, round, and reactive to light  EOM are normal  Right eye exhibits no discharge  Left eye exhibits no discharge  No scleral icterus  Neck: Normal range of motion  Neck supple  No thyromegaly present  Cardiovascular: Normal rate, regular rhythm, normal heart sounds and intact distal pulses  Exam reveals no gallop and no friction rub  No murmur heard  Pulmonary/Chest: Effort normal and breath sounds normal  No respiratory distress  She has no wheezes  She has no rales  She exhibits no tenderness  Musculoskeletal: Normal range of motion  She exhibits no edema or tenderness  Lymphadenopathy:     She has no cervical adenopathy  Neurological: She is alert  No cranial nerve deficit  She exhibits normal muscle tone  Coordination normal    Skin: Skin is warm and dry  No rash noted  She is not diaphoretic  No erythema  No pallor  Psychiatric: She has a normal mood and affect  Her behavior is normal  Judgment and thought content normal    Nursing note and vitals reviewed

## 2019-12-06 DIAGNOSIS — I10 HYPERTENSION, UNSPECIFIED TYPE: ICD-10-CM

## 2019-12-06 DIAGNOSIS — F41.9 ANXIETY AND DEPRESSION: ICD-10-CM

## 2019-12-06 DIAGNOSIS — F32.A ANXIETY AND DEPRESSION: ICD-10-CM

## 2019-12-06 RX ORDER — VALSARTAN 160 MG/1
160 TABLET ORAL 2 TIMES DAILY
Qty: 60 TABLET | Refills: 1 | Status: SHIPPED | OUTPATIENT
Start: 2019-12-06 | End: 2020-01-22

## 2019-12-06 RX ORDER — BUPROPION HYDROCHLORIDE 150 MG/1
150 TABLET ORAL 2 TIMES DAILY
Qty: 60 TABLET | Refills: 1 | Status: SHIPPED | OUTPATIENT
Start: 2019-12-06 | End: 2020-01-31

## 2019-12-06 NOTE — TELEPHONE ENCOUNTER
Patient called for refill of Wellbutrin and valsartan  Patient has follow up in early January  Have placed orders for approval  Thank you

## 2019-12-16 ENCOUNTER — OFFICE VISIT (OUTPATIENT)
Dept: FAMILY MEDICINE CLINIC | Facility: CLINIC | Age: 59
End: 2019-12-16

## 2019-12-16 VITALS
BODY MASS INDEX: 36.64 KG/M2 | TEMPERATURE: 97.7 F | DIASTOLIC BLOOD PRESSURE: 92 MMHG | HEIGHT: 63 IN | WEIGHT: 206.8 LBS | HEART RATE: 55 BPM | OXYGEN SATURATION: 97 % | SYSTOLIC BLOOD PRESSURE: 122 MMHG

## 2019-12-16 DIAGNOSIS — R00.2 PALPITATION: ICD-10-CM

## 2019-12-16 DIAGNOSIS — F41.9 ANXIETY: ICD-10-CM

## 2019-12-16 DIAGNOSIS — K21.9 GASTROESOPHAGEAL REFLUX DISEASE WITHOUT ESOPHAGITIS: ICD-10-CM

## 2019-12-16 PROCEDURE — 99213 OFFICE O/P EST LOW 20 MIN: CPT | Performed by: FAMILY MEDICINE

## 2019-12-16 RX ORDER — OMEPRAZOLE 40 MG/1
40 CAPSULE, DELAYED RELEASE ORAL 2 TIMES DAILY
Qty: 60 CAPSULE | Refills: 2 | Status: SHIPPED | OUTPATIENT
Start: 2019-12-16 | End: 2020-01-22

## 2019-12-16 RX ORDER — LORAZEPAM 0.5 MG/1
0.5 TABLET ORAL 2 TIMES DAILY
Qty: 30 TABLET | Refills: 0 | Status: SHIPPED | OUTPATIENT
Start: 2019-12-16 | End: 2020-01-22

## 2019-12-16 NOTE — PROGRESS NOTES
Assessment/Plan:  Patient will wean off metoprolol as directed  Patient will start hydrochlorothiazide 25 mg daily  Patient will try to get other brand of metoprolol and restart as directed  Consider loratadine daily along with Benadryl as needed       Diagnoses and all orders for this visit:    Gastroesophageal reflux disease without esophagitis  -     omeprazole (PriLOSEC) 40 MG capsule; Take 1 capsule (40 mg total) by mouth 2 (two) times a day    Palpitations and chest pain  -     LORazepam (ATIVAN) 0 5 mg tablet; Take 1 tablet (0 5 mg total) by mouth 2 (two) times a day    Anxiety  -     LORazepam (ATIVAN) 0 5 mg tablet; Take 1 tablet (0 5 mg total) by mouth 2 (two) times a day            Subjective:        Patient ID: Jhon Lennon is a 62 y o  female  Patient is here with possible allergic reaction the medications  Patient gets lip swelling as well as some difficulty breathing  Patient stop valsartan roughly week and half ago  Patient still with symptoms  Patient did notice this symptoms do occur after taking metoprolol  Patient with new manufacture  The following portions of the patient's history were reviewed and updated as appropriate: allergies, current medications, past family history, past medical history, past social history, past surgical history and problem list       Review of Systems   Constitutional: Negative  HENT: Positive for facial swelling  Eyes: Negative  Respiratory: Positive for shortness of breath  Cardiovascular: Positive for chest pain  Gastrointestinal: Negative  Endocrine: Negative  Genitourinary: Negative  Musculoskeletal: Negative  Skin: Negative  Allergic/Immunologic: Negative  Neurological: Negative  Hematological: Negative  Psychiatric/Behavioral: Negative              Objective:               /92 (BP Location: Right arm, Patient Position: Sitting, Cuff Size: Large)   Pulse 55   Temp 97 7 °F (36 5 °C) (Tympanic) Ht 5' 3" (1 6 m)   Wt 93 8 kg (206 lb 12 8 oz)   LMP  (LMP Unknown)   SpO2 97%   BMI 36 63 kg/m²          Physical Exam   Constitutional: She appears well-developed and well-nourished  HENT:   Head: Normocephalic  Right Ear: External ear normal    Left Ear: External ear normal    Mouth/Throat: Oropharynx is clear and moist    Cardiovascular: Normal rate, regular rhythm and normal heart sounds  Pulmonary/Chest: Effort normal and breath sounds normal    Nursing note and vitals reviewed

## 2019-12-19 ENCOUNTER — TELEPHONE (OUTPATIENT)
Dept: FAMILY MEDICINE CLINIC | Facility: CLINIC | Age: 59
End: 2019-12-19

## 2019-12-19 NOTE — TELEPHONE ENCOUNTER
Pt says she was having an issue with the medication she was taking, the toprol, apparently it was the , so she is taking the one she had prior and not having an issue  All is good now  But she had a question about the valsartan  Should she still take that also?  Please address

## 2020-01-13 ENCOUNTER — TELEPHONE (OUTPATIENT)
Dept: GASTROENTEROLOGY | Facility: MEDICAL CENTER | Age: 60
End: 2020-01-13

## 2020-01-13 NOTE — LETTER
January 13, 2020    [address] [id number]      Dear Ms Jones:    We have been unable to reach you to reschedule your procedure with Dr Gavin Fairchild, please call and schedule an appointment today  We look forward to hearing from you       Sincerely,      [facility]

## 2020-01-22 ENCOUNTER — TELEPHONE (OUTPATIENT)
Dept: FAMILY MEDICINE CLINIC | Facility: CLINIC | Age: 60
End: 2020-01-22

## 2020-01-22 PROBLEM — L20.84 INTRINSIC ATOPIC DERMATITIS: Status: ACTIVE | Noted: 2020-01-22

## 2020-01-22 PROBLEM — H10.13 ALLERGIC CONJUNCTIVITIS OF BOTH EYES: Status: ACTIVE | Noted: 2020-01-22

## 2020-01-22 PROBLEM — T78.3XXA ANGIO-EDEMA: Status: ACTIVE | Noted: 2020-01-22

## 2020-01-22 NOTE — TELEPHONE ENCOUNTER
Patient stopped Valsartan today, she states she was also allergic to Benicar,same type of med  Hives apst 3 months so tis was the suggestion by allergist  She has a OV with you 2/6/20  Do you want to wait until then to introduce anything new  She would like a call back from office

## 2020-01-22 NOTE — TELEPHONE ENCOUNTER
Patients allergist Dr Kaden Basurto called stating she thinks the patient is allergic to the Valsartan    Call back number is [phone]

## 2020-01-22 NOTE — TELEPHONE ENCOUNTER
Left message with patient to let her know about staying off medication and waiting to see how she does until she sees Dr Tonny Pacheco

## 2020-01-23 ENCOUNTER — TRANSCRIBE ORDERS (OUTPATIENT)
Dept: ADMINISTRATIVE | Facility: HOSPITAL | Age: 60
End: 2020-01-23

## 2020-01-23 DIAGNOSIS — R94.7 ABNORMAL RESULTS OF OTHER ENDOCRINE FUNCTION STUDIES: ICD-10-CM

## 2020-01-23 DIAGNOSIS — R53.83 FATIGUE, UNSPECIFIED TYPE: ICD-10-CM

## 2020-01-23 DIAGNOSIS — E55.9 VITAMIN D DEFICIENCY: ICD-10-CM

## 2020-01-23 DIAGNOSIS — R53.81 MALAISE: Primary | ICD-10-CM

## 2020-01-23 DIAGNOSIS — D64.9 ANEMIA, UNSPECIFIED TYPE: ICD-10-CM

## 2020-01-23 DIAGNOSIS — E34.9 ENDOCRINE DISEASE: ICD-10-CM

## 2020-01-23 DIAGNOSIS — R79.82 ELEVATED C-REACTIVE PROTEIN: ICD-10-CM

## 2020-01-23 DIAGNOSIS — E88.9 METABOLIC DISEASE: ICD-10-CM

## 2020-01-23 DIAGNOSIS — I10 ESSENTIAL HYPERTENSION, MALIGNANT: ICD-10-CM

## 2020-01-23 DIAGNOSIS — R63.5 ABNORMAL WEIGHT GAIN: ICD-10-CM

## 2020-01-23 DIAGNOSIS — R94.5 ABNORMAL RESULTS OF LIVER FUNCTION STUDIES: ICD-10-CM

## 2020-01-23 DIAGNOSIS — E78.5 HYPERLIPIDEMIA, UNSPECIFIED HYPERLIPIDEMIA TYPE: ICD-10-CM

## 2020-01-23 DIAGNOSIS — Z13.1 SCREENING FOR DIABETES MELLITUS: ICD-10-CM

## 2020-01-23 DIAGNOSIS — Z13.228 SCREENING FOR METABOLIC DISORDER: ICD-10-CM

## 2020-01-23 DIAGNOSIS — E03.9 HYPOTHYROIDISM, UNSPECIFIED TYPE: ICD-10-CM

## 2020-01-23 DIAGNOSIS — Z13.29 SCREENING FOR ENDOCRINE DISORDER: ICD-10-CM

## 2020-01-23 DIAGNOSIS — R74.9 ABNORMAL LEVEL OF SERUM ENZYME: ICD-10-CM

## 2020-01-23 DIAGNOSIS — E66.9 OBESITY, UNSPECIFIED CLASSIFICATION, UNSPECIFIED OBESITY TYPE, UNSPECIFIED WHETHER SERIOUS COMORBIDITY PRESENT: ICD-10-CM

## 2020-01-23 DIAGNOSIS — I51.9 HEART DISEASE, UNSPECIFIED: ICD-10-CM

## 2020-01-24 LAB
CREAT ?TM UR-SCNC: 92 UMOL/L
EXT MICROALBUMIN URINE RANDOM: 0.8
HBA1C MFR BLD HPLC: 5.8 %
MICROALBUMIN/CREAT UR: 9 MG/G{CREAT}

## 2020-01-25 PROBLEM — J30.0 VASOMOTOR RHINITIS: Status: ACTIVE | Noted: 2020-01-25

## 2020-01-27 ENCOUNTER — OFFICE VISIT (OUTPATIENT)
Dept: CARDIOLOGY CLINIC | Facility: CLINIC | Age: 60
End: 2020-01-27
Payer: COMMERCIAL

## 2020-01-27 VITALS
WEIGHT: 214 LBS | HEART RATE: 72 BPM | SYSTOLIC BLOOD PRESSURE: 154 MMHG | HEIGHT: 64 IN | BODY MASS INDEX: 36.54 KG/M2 | DIASTOLIC BLOOD PRESSURE: 94 MMHG

## 2020-01-27 DIAGNOSIS — E78.9 LIPID DISORDER: ICD-10-CM

## 2020-01-27 DIAGNOSIS — E78.2 MIXED HYPERLIPIDEMIA: ICD-10-CM

## 2020-01-27 DIAGNOSIS — I10 ESSENTIAL HYPERTENSION: ICD-10-CM

## 2020-01-27 DIAGNOSIS — R00.2 PALPITATION: Primary | ICD-10-CM

## 2020-01-27 PROBLEM — R07.89 OTHER CHEST PAIN: Status: ACTIVE | Noted: 2020-01-27

## 2020-01-27 PROCEDURE — 99214 OFFICE O/P EST MOD 30 MIN: CPT | Performed by: INTERNAL MEDICINE

## 2020-01-27 NOTE — PATIENT INSTRUCTIONS
Chest Pain   AMBULATORY CARE:   Chest pain  can be caused by a range of conditions, from not serious to life-threatening  It may be caused by a heart attack or a blood clot in your lungs  Sometimes chest pain or pressure is caused by poor blood flow to your heart (angina)  Infection, inflammation, or a fracture in the bones or cartilage in your chest can cause pain or discomfort  Chest pain can also be a symptom of a digestive problem, such as acid reflux or a stomach ulcer  An anxiety attack or a strong emotion such as anger can also cause chest pain  It is important to follow up with your healthcare provider to find the cause of your chest pain  Common symptoms you may have with chest pain:   · Fever or sweating     · Nausea or vomiting     · Shortness of breath     · Discomfort or pressure that spreads from your chest to your back, jaw, or arm     · A racing or slow heartbeat     · Feeling weak, tired, or faint  Call 911 if:   · You have any of the following signs of a heart attack:      ¨ Squeezing, pressure, or pain in your chest that lasts longer than 5 minutes or returns    ¨ Discomfort or pain in your back, neck, jaw, stomach, or arm     ¨ Trouble breathing    ¨ Nausea or vomiting    ¨ Lightheadedness or a sudden cold sweat, especially with chest pain or trouble breathing    Seek care immediately if:   · You have chest discomfort that gets worse, even with medicine  · You cough or vomit blood  · Your bowel movements are black or bloody  · You cannot stop vomiting, or it hurts to swallow  Contact your healthcare provider if:   · You have questions or concerns about your condition or care  Treatment for chest pain  may include medicine to treat your symptoms while your healthcare provider finds the cause of your chest pain  · Medicines  may be given to treat the cause of your chest pain  Examples include pain medicine, anxiety medicine, or medicines to increase blood flow to your heart  · Do not take certain medicines without asking your healthcare provider first   These include NSAIDs, herbal or vitamin supplements, or hormones (estrogen or progestin)  Follow up with your healthcare provider within 72 hours, or as directed: You may need to return for more tests to find the cause of your chest pain  You may be referred to a specialist, such as a cardiologist or gastroenterologist  Write down your questions so you remember to ask them during your visits  Healthy living tips: The following are general healthy guidelines  If your chest pain is caused by a heart problem, your healthcare provider will give you specific guidelines to follow  · Do not smoke  Nicotine and other chemicals in cigarettes and cigars can cause lung and heart damage  Ask your healthcare provider for information if you currently smoke and need help to quit  E-cigarettes or smokeless tobacco still contain nicotine  Talk to your healthcare provider before you use these products  · Eat a variety of healthy, low-fat foods  Healthy foods include fruits, vegetables, whole-grain breads, low-fat dairy products, beans, lean meats, and fish  Ask for more information about a heart healthy diet  · Ask about activity  Your healthcare provider will tell you which activities to limit or avoid  Ask when you can drive, return to work, and have sex  Ask about the best exercise plan for you  · Maintain a healthy weight  Ask your healthcare provider how much you should weigh  Ask him or her to help you create a weight loss plan if you are overweight  · Get the flu and pneumonia vaccines  All adults should get the influenza (flu) vaccine  Get it every year as soon as it becomes available  The pneumococcal vaccine is given to adults aged 72 years or older  The vaccine is given every 5 years to prevent pneumococcal disease, such as pneumonia    © 2017 [employer] Information is for End User's use only and may not be sold, redistributed or otherwise used for commercial purposes  All illustrations and images included in CareNotes® are the copyrighted property of [employer]  or [employer]  The above information is an  only  It is not intended as medical advice for individual conditions or treatments  Talk to your doctor, nurse or pharmacist before following any medical regimen to see if it is safe and effective for you

## 2020-01-27 NOTE — PROGRESS NOTES
Cardiology Follow Up    Suzi Rainey  1960  [id number]  [address] [facility] [address] [id number] [phone]    1  Palpitations and chest pain  2  Essential hypertension     3  Lipid disorder     4  Mixed hyperlipidemia       Chief Complaint   Patient presents with    Advice Only     Did not get holter monitor/stress test done due to not having insurance   Shortness of Breath     With prednisone   Edema     "My whole body "      Interval History: Patient is here for continued management of HTN, HLD,  palpitations, and chest pains  This was in the setting of anxiety as well with being on Buspar, and her symptoms are improving now compared to before  It felt like her heart was beating fast and heavy, and sometimes with skipping of beats, and associated with chest pain  No associated symptoms like nausea or diaphoresis  Chest pain was on left side of her chest   It usually happens after eating  No reported shortness of breath,  lightheadedness, syncope, LE edema, orthopnea, PND, or significant weight changes  Patient remains active without any increased fatigue out of the ordinary        Patient Active Problem List   Diagnosis    Acute upper respiratory infection    Allergic rhinitis    Angina pectoris (HCC)    Anxiety and depression    Arthritis    Asthma due to seasonal allergies    Attention disturbance    Benign essential hypertension    Chronic interstitial cystitis    Chronic low back pain    Familial hyperlipidemia    Elevated antinuclear antibody (SON) level    Elevated blood sugar    Essential hypertension    Female pelvic pain    Hyperlipidemia    Lipid disorder    Migraines    Obesity    Tick bite    Tobacco abuse    Venous insufficiency    Anxiety    Insomnia    Snoring    Cervicalgia    Chronic daily headache    AMD (age-related macular degeneration), bilateral    Allergic reaction    Palpitations and chest pain      Intertrigo    Epigastric pain    Right upper quadrant pain    Acute gastric ulcer without hemorrhage or perforation    Tinea corporis    Gastroesophageal reflux disease without esophagitis    Allergic conjunctivitis of both eyes    Intrinsic atopic dermatitis    Angio-edema    Vasomotor rhinitis    Other chest pain     Past Medical History:   Diagnosis Date    Arthritis     Asthma     Colon polyp     GERD (gastroesophageal reflux disease)     Heart murmur     Hives     Hyperlipidemia     Hypertension     Infertility, female     Migraine     Palpitations     Psychiatric disorder     Wears glasses      Social History     Socioeconomic History    Marital status:      Spouse name: Not on file    Number of children: 0    Years of education: Not on file    Highest education level: Not on file   Occupational History    Occupation: Urgent Care      Comment: full-time    Social Needs    Financial resource strain: Not on file    Food insecurity:     Worry: Not on file     Inability: Not on file    Transportation needs:     Medical: Not on file     Non-medical: Not on file   Tobacco Use    Smoking status: Former Smoker     Packs/day: 0 50     Years: 10 00     Pack years: 5 00     Last attempt to quit: 2019     Years since quittin 0    Smokeless tobacco: Never Used   Substance and Sexual Activity    Alcohol use: No    Drug use: No    Sexual activity: Not Currently   Lifestyle    Physical activity:     Days per week: 0 days     Minutes per session: 0 min    Stress: Not on file   Relationships    Social connections:     Talks on phone: Not on file     Gets together: Not on file     Attends Scientologist service: Not on file     Active member of club or organization: Not on file     Attends meetings of clubs or organizations: Not on file     Relationship status: Not on file    Intimate partner violence:     Fear of current or ex partner: Not on file     Emotionally abused: Not on file     Physically abused: Not on file     Forced sexual activity: Not on file   Other Topics Concern    Not on file   Social History Narrative    Who lives in your home: Alone     What type of home do you live in: Apartment     Age of your home: 1952 built     How long have you been living there: 5 yrs     Type of heat: forced hot air     Type of fuel: electric     What type of jamin is in your bedroom: carpet jamin     Do you have the following in or near your home:    Air products: Humidifier in the bedroom/kitchen     Pests: none     Pets: none     Are pets allowed in bedroom: N/A     Open fields, wooded areas nearby: No     Basement: [id number] mold     Exposure to second hand smoke: No    Central air     Habits:    Caffeine: Hot tea 1 cup daily- ice tea 1 cup in the afternoon    Chocolate: Occasionally     Other:      Family History   Problem Relation Age of Onset    Lung cancer Mother     Brain cancer Mother     Diabetes Father     Depression Father     Other Father         septic     Diabetes Other     Allergies Sister     Hashimoto's thyroiditis Sister     Hodgkin's lymphoma Brother     Abdominal aortic aneurysm Sister     Diabetes Sister     No Known Problems Sister     No Known Problems Brother      Past Surgical History:   Procedure Laterality Date    COLONOSCOPY      EGD      EXPLORATORY LAPAROTOMY      for infertility    HAND SURGERY      Hand excision of tendon cyst    WISDOM TOOTH EXTRACTION         Current Outpatient Medications:     aspirin 81 mg chewable tablet, Chew 1 tablet (81 mg total) daily, Disp: , Rfl: 0    buPROPion (WELLBUTRIN XL) 150 mg 24 hr tablet, Take 1 tablet (150 mg total) by mouth 2 (two) times a day, Disp: 60 tablet, Rfl: 1    celecoxib (CeleBREX) 100 mg capsule, Take 100 mg by mouth as needed , Disp: , Rfl:     cetirizine (ZyrTEC) 10 mg tablet, Take 1 tablet (10 mg total) by mouth daily for 365 doses, Disp: 30 tablet, Rfl: 11    DEXILANT 60 MG capsule, Take 1 capsule by mouth daily, Disp: , Rfl:     econazole nitrate 1 % cream, Apply topically daily, Disp: 30 g, Rfl: 1    famotidine (PEPCID) 20 mg tablet, Take 1 tablet (20 mg total) by mouth 2 (two) times a day, Disp: 60 tablet, Rfl: 11    fexofenadine (ALLEGRA) 180 MG tablet, Take 1 tablet (180 mg total) by mouth daily IN AM, Disp: 30 tablet, Rfl: 12    fluticasone (FLONASE) 50 mcg/act nasal spray, 1 spray into each nostril 2 (two) times a day, Disp: 1 Bottle, Rfl: 3    hydrocortisone-pramoxine (PROCTOFOAM-HC) rectal foam, Insert 1 applicator into the rectum 2 (two) times a day, Disp: 10 g, Rfl: 1    metoprolol succinate (TOPROL-XL) 100 mg 24 hr tablet, Take 1 tablet (100 mg total) by mouth 2 (two) times a day, Disp: 180 tablet, Rfl: 1    MULTIPLE VITAMIN PO, Take by mouth, Disp: , Rfl:     predniSONE 10 mg tablet, Take with food  Take 4 tabs x 2 days, then 3 x 2 days, then 2 x 2 days, then 1 x 2 days  , Disp: 20 tablet, Rfl: 1    acetaminophen (TYLENOL) 325 mg tablet, Take 650 mg by mouth every 6 (six) hours as needed for mild pain, Disp: , Rfl:     fluticasone-salmeterol (ADVAIR DISKUS) 250-50 mcg/dose inhaler, Inhale 1 puff 2 (two) times a day Rinse mouth after use  (Patient not taking: Reported on 11/27/2019), Disp: 1 Inhaler, Rfl: 5    nystatin (MYCOSTATIN) powder, Apply topically 2 (two) times a day (Patient not taking: Reported on 1/27/2020), Disp: 15 g, Rfl: 1  Allergies   Allergen Reactions    Ace Inhibitors Angioedema    Beta Adrenergic Blockers Anaphylaxis     Anaphylaxis  Anaphylaxis    Amlodipine Edema    Atorvastatin Other (See Comments)     "throat closing up"    Barium Sulfate Other (See Comments)     hives throat closing    Benicar [Olmesartan] Angioedema     See Allergy note from 9/11/2008        Other Other (See Comments)     Other reaction(s): angioadema  Other reaction(s): Other (See Comments)  Preservatives- itching throat closes, hi  Other reaction(s): angioadema  E Z Cat - hives throat closes itching,  Preservatives- itching throat closes, hi    Sulfa Antibiotics Other (See Comments)     stuffiness,itching,hives,throat closing    Valsartan Angioedema       Labs:  No visits with results within 6 Month(s) from this visit  Latest known visit with results is:   Hospital Outpatient Visit on 05/28/2019   Component Date Value    Case Report 05/28/2019                      Value:Surgical Pathology Report                         Case: [id number]                                   Authorizing Provider:  Carmen Nicholson MD        Collected:           05/28/2019 1043              Ordering Location:     [facility]        Received:            05/28/2019 1127                                     [facility]                                                          Pathologist:           Tamika Peck MD                                                              Specimens:   A) - Duodenum, duodenum bx, R/O celiac                                                              B) - Stomach, antral bx, gastritis, R/O H  Pylori,                                                  C) - Stomach, gastric ulcer bx                                                             Addendum 05/28/2019                      Value: This result contains rich text formatting which cannot be displayed here   Final Diagnosis 05/28/2019                      Value: This result contains rich text formatting which cannot be displayed here   Note 05/28/2019                      Value: This result contains rich text formatting which cannot be displayed here   Additional Information 05/28/2019                      Value: This result contains rich text formatting which cannot be displayed here  Tillman Nestor Gross Description 05/28/2019                      Value: This result contains rich text formatting which cannot be displayed here  Lab Results   Component Value Date    CHOL 413 (H) 11/13/2017    TRIG 163 (H) 05/14/2019    TRIG 159 (H) 11/13/2017    HDL 69 (H) 05/14/2019    HDL 94 11/13/2017     Imaging: No results found  Review of Systems:  Review of Systems   Constitutional: Negative for activity change, appetite change, chills, diaphoresis, fatigue and unexpected weight change  HENT: Negative for hearing loss, nosebleeds and sore throat  Eyes: Negative for photophobia and visual disturbance  Respiratory: Negative for cough, chest tightness, shortness of breath and wheezing  Cardiovascular: Positive for chest pain and palpitations  Negative for leg swelling  Gastrointestinal: Negative for abdominal pain, diarrhea, nausea and vomiting  Endocrine: Negative for polyuria  Genitourinary: Negative for dysuria, frequency and hematuria  Musculoskeletal: Negative for arthralgias, back pain, gait problem and neck pain  Skin: Negative for pallor and rash  Neurological: Negative for dizziness, syncope and headaches  Hematological: Does not bruise/bleed easily  Psychiatric/Behavioral: Negative for behavioral problems and confusion  Physical Exam:  Physical Exam   Constitutional: She is oriented to person, place, and time  She appears well-developed and well-nourished  HENT:   Head: Normocephalic and atraumatic  Nose: Nose normal    Eyes: Pupils are equal, round, and reactive to light  EOM are normal  No scleral icterus  Neck: Normal range of motion  Neck supple  No JVD present  Cardiovascular: Normal rate, regular rhythm and normal heart sounds  Exam reveals no gallop and no friction rub  No murmur heard  Pulmonary/Chest: Effort normal and breath sounds normal  No respiratory distress  She has no wheezes  She has no rales  Abdominal: Soft  Bowel sounds are normal  She exhibits no distension  There is no tenderness  Musculoskeletal: Normal range of motion   She exhibits no edema or deformity  Neurological: She is alert and oriented to person, place, and time  No cranial nerve deficit  Skin: Skin is warm and dry  No rash noted  She is not diaphoretic  Psychiatric: She has a normal mood and affect  Her behavior is normal    Vitals reviewed  Blood pressure 154/94, pulse 72, height 5' 4" (1 626 m), weight 97 1 kg (214 lb), not currently breastfeeding  Discussion/Summary:  Chest pain/palpitations: has been ordered a stress test and a Holter monitor, which are yet to be completed  Will review these tests, once available  Will also check an echo to assess for structural changes  HTN: currently on high Toprol XL, but with very elevated BP today  Will assess BP response to exercise to see if she would benefit from additional agents  Was on valsartan, but had an allergic reaction to it  She is currently on prednisone for that reaction  HLD: currently not on medical therapy  Last levels were measured May of 2019 with LDL of 245 HDL 69  and   Recheck at this time to assess for any changes was just done

## 2020-01-30 DIAGNOSIS — F41.9 ANXIETY AND DEPRESSION: ICD-10-CM

## 2020-01-30 DIAGNOSIS — F32.A ANXIETY AND DEPRESSION: ICD-10-CM

## 2020-01-31 RX ORDER — BUPROPION HYDROCHLORIDE 150 MG/1
TABLET ORAL
Qty: 60 TABLET | Refills: 0 | Status: SHIPPED | OUTPATIENT
Start: 2020-01-31 | End: 2020-02-06 | Stop reason: SDUPTHER

## 2020-02-06 ENCOUNTER — OFFICE VISIT (OUTPATIENT)
Dept: FAMILY MEDICINE CLINIC | Facility: CLINIC | Age: 60
End: 2020-02-06
Payer: COMMERCIAL

## 2020-02-06 VITALS
HEIGHT: 64 IN | SYSTOLIC BLOOD PRESSURE: 120 MMHG | DIASTOLIC BLOOD PRESSURE: 70 MMHG | TEMPERATURE: 98.2 F | WEIGHT: 208 LBS | BODY MASS INDEX: 35.51 KG/M2

## 2020-02-06 DIAGNOSIS — R00.2 PALPITATION: ICD-10-CM

## 2020-02-06 DIAGNOSIS — I20.9 ANGINA PECTORIS (HCC): Primary | ICD-10-CM

## 2020-02-06 DIAGNOSIS — E78.49 FAMILIAL HYPERLIPIDEMIA: ICD-10-CM

## 2020-02-06 DIAGNOSIS — I10 ESSENTIAL HYPERTENSION: ICD-10-CM

## 2020-02-06 DIAGNOSIS — F32.A ANXIETY AND DEPRESSION: ICD-10-CM

## 2020-02-06 DIAGNOSIS — R79.89 LOW TSH LEVEL: ICD-10-CM

## 2020-02-06 DIAGNOSIS — F41.9 ANXIETY AND DEPRESSION: ICD-10-CM

## 2020-02-06 PROCEDURE — 3078F DIAST BP <80 MM HG: CPT | Performed by: FAMILY MEDICINE

## 2020-02-06 PROCEDURE — 3008F BODY MASS INDEX DOCD: CPT | Performed by: FAMILY MEDICINE

## 2020-02-06 PROCEDURE — 3074F SYST BP LT 130 MM HG: CPT | Performed by: FAMILY MEDICINE

## 2020-02-06 PROCEDURE — 1036F TOBACCO NON-USER: CPT | Performed by: FAMILY MEDICINE

## 2020-02-06 PROCEDURE — 99214 OFFICE O/P EST MOD 30 MIN: CPT | Performed by: FAMILY MEDICINE

## 2020-02-06 RX ORDER — HYDROCHLOROTHIAZIDE 25 MG/1
25 TABLET ORAL DAILY
Qty: 30 TABLET | Refills: 3 | Status: SHIPPED | OUTPATIENT
Start: 2020-02-06 | End: 2020-10-01 | Stop reason: SDUPTHER

## 2020-02-06 RX ORDER — HYDROCHLOROTHIAZIDE 25 MG/1
25 TABLET ORAL DAILY
COMMUNITY
End: 2020-02-06 | Stop reason: SDUPTHER

## 2020-02-06 RX ORDER — METOPROLOL SUCCINATE 100 MG/1
100 TABLET, EXTENDED RELEASE ORAL 2 TIMES DAILY
Qty: 60 TABLET | Refills: 3 | Status: SHIPPED | OUTPATIENT
Start: 2020-02-06 | End: 2020-04-15 | Stop reason: SDUPTHER

## 2020-02-06 RX ORDER — FLUOXETINE 10 MG/1
10 CAPSULE ORAL DAILY
Qty: 90 CAPSULE | Refills: 1 | Status: SHIPPED | OUTPATIENT
Start: 2020-02-06 | End: 2020-04-09 | Stop reason: SDUPTHER

## 2020-02-06 RX ORDER — LORAZEPAM 0.5 MG/1
0.5 TABLET ORAL 2 TIMES DAILY PRN
Qty: 30 TABLET | Refills: 0 | Status: SHIPPED | OUTPATIENT
Start: 2020-02-06 | End: 2020-02-21 | Stop reason: SDUPTHER

## 2020-02-06 RX ORDER — BUPROPION HYDROCHLORIDE 150 MG/1
150 TABLET ORAL 2 TIMES DAILY
Qty: 60 TABLET | Refills: 3 | Status: SHIPPED | OUTPATIENT
Start: 2020-02-06 | End: 2020-04-09 | Stop reason: SDUPTHER

## 2020-02-06 NOTE — PROGRESS NOTES
Assessment/Plan:       Diagnoses and all orders for this visit:    Angina pectoris ([address] )    Anxiety and depression  -     buPROPion (WELLBUTRIN XL) 150 mg 24 hr tablet; Take 1 tablet (150 mg total) by mouth 2 (two) times a day  -     FLUoxetine (PROzac) 10 mg capsule; Take 1 capsule (10 mg total) by mouth daily  -     LORazepam (ATIVAN) 0 5 mg tablet; Take 1 tablet (0 5 mg total) by mouth 2 (two) times a day as needed for anxiety    Essential hypertension  -     metoprolol succinate (TOPROL-XL) 100 mg 24 hr tablet; Take 1 tablet (100 mg total) by mouth 2 (two) times a day  -     hydrochlorothiazide (HYDRODIURIL) 25 mg tablet; Take 1 tablet (25 mg total) by mouth daily    Familial hyperlipidemia    Palpitations and chest pain  Low TSH level  -     TSH, 3rd generation with Free T4 reflex; Future  -     Thyroid Antibodies Panel; Future  -     US thyroid; Future    Other orders  -     Discontinue: hydrochlorothiazide (HYDRODIURIL) 25 mg tablet; Take 25 mg by mouth daily            Subjective:        Patient ID: Pillo Robb is a 62 y o  female  Patient is here to follow-up on anxiety as well as CAD, hypertension hyperlipidemia  Patient has been off also valsartan  Patient with increased anxiety recently  Patient does have shortness of breath chest pain or palpitations  Patient did see cardiology is going for echo, Holter and stress test in March  Urination defecation stable overall  Patient has noticed some increased anxiety as well as a slightly depressed  No suicidal ideation        The following portions of the patient's history were reviewed and updated as appropriate: allergies, current medications, past family history, past medical history, past social history, past surgical history and problem list       Review of Systems   Constitutional: Negative  Negative for fever  HENT: Negative  Eyes: Negative  Respiratory: Positive for shortness of breath      Cardiovascular: Positive for chest pain and palpitations  Gastrointestinal: Negative  Endocrine: Negative  Genitourinary: Negative  Musculoskeletal: Negative  Skin: Negative  Allergic/Immunologic: Negative  Neurological: Negative  Hematological: Negative  Psychiatric/Behavioral: The patient is nervous/anxious  Objective:      BMI Counseling: Body mass index is 35 7 kg/m²  The BMI is above normal  Nutrition recommendations include decreasing portion sizes  Exercise recommendations include moderate physical activity 150 minutes/week  /70 (BP Location: Right arm, Patient Position: Sitting, Cuff Size: Large)   Temp 98 2 °F (36 8 °C) (Tympanic)   Ht 5' 4" (1 626 m)   Wt 94 3 kg (208 lb)   LMP  (LMP Unknown)   BMI 35 70 kg/m²          Physical Exam   Constitutional: She appears well-developed and well-nourished  No distress  HENT:   Head: Normocephalic  Right Ear: External ear normal    Left Ear: External ear normal    Mouth/Throat: Oropharynx is clear and moist  No oropharyngeal exudate  Eyes: Pupils are equal, round, and reactive to light  EOM are normal  Right eye exhibits no discharge  Left eye exhibits no discharge  No scleral icterus  Neck: Normal range of motion  Neck supple  No thyromegaly present  Cardiovascular: Normal rate, regular rhythm, normal heart sounds and intact distal pulses  Exam reveals no gallop and no friction rub  No murmur heard  Pulmonary/Chest: Effort normal and breath sounds normal  No respiratory distress  She has no wheezes  She has no rales  She exhibits no tenderness  Abdominal: Soft  Bowel sounds are normal  She exhibits no distension  There is no tenderness  There is no rebound and no guarding  Musculoskeletal: Normal range of motion  She exhibits no edema or tenderness  Lymphadenopathy:     She has no cervical adenopathy  Neurological: She is alert  No cranial nerve deficit  She exhibits normal muscle tone   Coordination normal    Skin: Skin is warm and dry  No rash noted  She is not diaphoretic  No erythema  No pallor  Psychiatric: She has a normal mood and affect  Her behavior is normal  Judgment and thought content normal    Nursing note and vitals reviewed

## 2020-02-21 DIAGNOSIS — F32.A ANXIETY AND DEPRESSION: ICD-10-CM

## 2020-02-21 DIAGNOSIS — F41.9 ANXIETY AND DEPRESSION: ICD-10-CM

## 2020-02-21 RX ORDER — LORAZEPAM 0.5 MG/1
0.5 TABLET ORAL 2 TIMES DAILY PRN
Qty: 30 TABLET | Refills: 0 | Status: SHIPPED | OUTPATIENT
Start: 2020-02-21 | End: 2020-03-06 | Stop reason: SDUPTHER

## 2020-03-05 ENCOUNTER — HOSPITAL ENCOUNTER (OUTPATIENT)
Dept: ULTRASOUND IMAGING | Facility: MEDICAL CENTER | Age: 60
Discharge: HOME | End: 2020-03-05
Payer: COMMERCIAL

## 2020-03-05 ENCOUNTER — APPOINTMENT (OUTPATIENT)
Dept: LAB | Facility: MEDICAL CENTER | Age: 60
End: 2020-03-05
Payer: COMMERCIAL

## 2020-03-05 DIAGNOSIS — R79.89 LOW TSH LEVEL: ICD-10-CM

## 2020-03-05 PROCEDURE — 76536 US EXAM OF HEAD AND NECK: CPT

## 2020-03-06 ENCOUNTER — HOSPITAL ENCOUNTER (OUTPATIENT)
Dept: NON INVASIVE DIAGNOSTICS | Facility: CLINIC | Age: 60
Discharge: HOME | End: 2020-03-06
Payer: COMMERCIAL

## 2020-03-06 DIAGNOSIS — F41.9 ANXIETY AND DEPRESSION: ICD-10-CM

## 2020-03-06 DIAGNOSIS — I10 ESSENTIAL HYPERTENSION: ICD-10-CM

## 2020-03-06 DIAGNOSIS — F32.A ANXIETY AND DEPRESSION: ICD-10-CM

## 2020-03-06 DIAGNOSIS — R00.2 PALPITATION: ICD-10-CM

## 2020-03-06 PROCEDURE — 93306 TTE W/DOPPLER COMPLETE: CPT

## 2020-03-06 PROCEDURE — 93306 TTE W/DOPPLER COMPLETE: CPT | Performed by: INTERNAL MEDICINE

## 2020-03-06 RX ORDER — LORAZEPAM 0.5 MG/1
0.5 TABLET ORAL 2 TIMES DAILY PRN
Qty: 30 TABLET | Refills: 0 | Status: SHIPPED | OUTPATIENT
Start: 2020-03-06 | End: 2020-03-26

## 2020-03-12 ENCOUNTER — HOSPITAL ENCOUNTER (OUTPATIENT)
Dept: NON INVASIVE DIAGNOSTICS | Facility: CLINIC | Age: 60
Discharge: HOME | End: 2020-03-12
Payer: COMMERCIAL

## 2020-03-12 DIAGNOSIS — R00.2 PALPITATION: ICD-10-CM

## 2020-03-12 PROCEDURE — 93016 CV STRESS TEST SUPVJ ONLY: CPT | Performed by: INTERNAL MEDICINE

## 2020-03-12 PROCEDURE — 93227 XTRNL ECG REC<48 HR R&I: CPT | Performed by: INTERNAL MEDICINE

## 2020-03-12 PROCEDURE — 93018 CV STRESS TEST I&R ONLY: CPT | Performed by: INTERNAL MEDICINE

## 2020-03-12 PROCEDURE — 93225 XTRNL ECG REC<48 HRS REC: CPT

## 2020-03-12 PROCEDURE — 93226 XTRNL ECG REC<48 HR SCAN A/R: CPT

## 2020-03-12 PROCEDURE — 93017 CV STRESS TEST TRACING ONLY: CPT

## 2020-03-16 LAB
ARRHY DURING EX: NORMAL
CHEST PAIN STATEMENT: NORMAL
MAX DIASTOLIC BP: 80 MMHG
MAX HEART RATE: 113 BPM
MAX PREDICTED HEART RATE: 158 BPM
MAX. SYSTOLIC BP: 186 MMHG
PROTOCOL NAME: NORMAL
REASON FOR TERMINATION: NORMAL
TARGET HR FORMULA: NORMAL
TEST INDICATION: NORMAL
TIME IN EXERCISE PHASE: NORMAL

## 2020-03-19 ENCOUNTER — OFFICE VISIT (OUTPATIENT)
Dept: CARDIOLOGY CLINIC | Facility: CLINIC | Age: 60
End: 2020-03-19
Payer: COMMERCIAL

## 2020-03-19 VITALS
OXYGEN SATURATION: 98 % | SYSTOLIC BLOOD PRESSURE: 120 MMHG | HEIGHT: 64 IN | WEIGHT: 211.9 LBS | HEART RATE: 60 BPM | DIASTOLIC BLOOD PRESSURE: 74 MMHG | BODY MASS INDEX: 36.18 KG/M2

## 2020-03-19 DIAGNOSIS — R07.89 OTHER CHEST PAIN: ICD-10-CM

## 2020-03-19 DIAGNOSIS — I48.0 PAROXYSMAL ATRIAL FIBRILLATION (HCC): ICD-10-CM

## 2020-03-19 DIAGNOSIS — R00.2 PALPITATION: ICD-10-CM

## 2020-03-19 DIAGNOSIS — I10 ESSENTIAL HYPERTENSION: Primary | ICD-10-CM

## 2020-03-19 DIAGNOSIS — E78.2 MIXED HYPERLIPIDEMIA: ICD-10-CM

## 2020-03-19 PROCEDURE — 99214 OFFICE O/P EST MOD 30 MIN: CPT | Performed by: INTERNAL MEDICINE

## 2020-03-19 PROCEDURE — 1036F TOBACCO NON-USER: CPT | Performed by: INTERNAL MEDICINE

## 2020-03-19 PROCEDURE — 3074F SYST BP LT 130 MM HG: CPT | Performed by: INTERNAL MEDICINE

## 2020-03-19 PROCEDURE — 3078F DIAST BP <80 MM HG: CPT | Performed by: INTERNAL MEDICINE

## 2020-03-19 PROCEDURE — 3008F BODY MASS INDEX DOCD: CPT | Performed by: INTERNAL MEDICINE

## 2020-03-19 NOTE — PROGRESS NOTES
Cardiology Follow Up    Vera Clayton  1960  [id number]  [address] [facility] [address] [address] [phone]    1  Essential hypertension     2  Other chest pain     3  Mixed hyperlipidemia     4  Palpitations and chest pain  5  Paroxysmal atrial fibrillation (HCC)  apixaban (Eliquis) 5 mg     Chief Complaint   Patient presents with    Follow-up    Palpitations     Last week  States she was nervous with holter monitor   Dizziness     Interval History: Patient is here for continued management of HTN, HLD,  palpitations, and chest pains  This was in the setting of anxiety as well with being on Buspar, and her symptoms are improving now compared to before  It felt like her heart was beating fast and heavy, and sometimes with skipping of beats, and associated with chest pain  No associated symptoms like nausea or diaphoresis  Chest pain was on left side of her chest   It usually happens after eating  No reported shortness of breath,  lightheadedness, syncope, LE edema, orthopnea, PND, or significant weight changes  Patient remains active without any increased fatigue out of the ordinary  No significant change to symptoms since last visit      Patient Active Problem List   Diagnosis    Acute upper respiratory infection    Allergic rhinitis    Angina pectoris (HCC)    Anxiety and depression    Arthritis    Asthma due to seasonal allergies    Attention disturbance    Benign essential hypertension    Chronic interstitial cystitis    Chronic low back pain    Familial hyperlipidemia    Elevated antinuclear antibody (SON) level    Elevated blood sugar    Essential hypertension    Female pelvic pain    Hyperlipidemia    Lipid disorder    Migraines    Obesity    Tick bite    Tobacco abuse    Venous insufficiency    Anxiety    Insomnia    Snoring    Cervicalgia    Chronic daily headache    AMD (age-related macular degeneration), bilateral    Allergic reaction    Palpitations and chest pain      Intertrigo    Epigastric pain    Right upper quadrant pain    Acute gastric ulcer without hemorrhage or perforation    Tinea corporis    Gastroesophageal reflux disease without esophagitis    Allergic conjunctivitis of both eyes    Intrinsic atopic dermatitis    Angio-edema    Vasomotor rhinitis    Other chest pain    Low TSH level    Paroxysmal atrial fibrillation (HCC)     Past Medical History:   Diagnosis Date    Arthritis     Asthma     Colon polyp     GERD (gastroesophageal reflux disease)     Heart murmur     Hives     Hyperlipidemia     Hypertension     Infertility, female     Migraine     Palpitations     Psychiatric disorder     Wears glasses      Social History     Socioeconomic History    Marital status:      Spouse name: Not on file    Number of children: 0    Years of education: Not on file    Highest education level: Not on file   Occupational History    Occupation: Urgent Care      Comment: full-time    Social Needs    Financial resource strain: Not on file    Food insecurity:     Worry: Not on file     Inability: Not on file    Transportation needs:     Medical: Not on file     Non-medical: Not on file   Tobacco Use    Smoking status: Former Smoker     Packs/day: 0 50     Years: 10 00     Pack years: 5 00     Last attempt to quit: 2019     Years since quittin 2    Smokeless tobacco: Never Used   Substance and Sexual Activity    Alcohol use: No    Drug use: No    Sexual activity: Not Currently   Lifestyle    Physical activity:     Days per week: 0 days     Minutes per session: 0 min    Stress: Not on file   Relationships    Social connections:     Talks on phone: Not on file     Gets together: Not on file     Attends Taoist service: Not on file     Active member of club or organization: Not on file     Attends meetings of clubs or organizations: Not on file     Relationship status: Not on file    Intimate partner violence:     Fear of current or ex partner: Not on file     Emotionally abused: Not on file     Physically abused: Not on file     Forced sexual activity: Not on file   Other Topics Concern    Not on file   Social History Narrative    Who lives in your home: Alone     What type of home do you live in: Apartment     Age of your home: 1952 built     How long have you been living there: 5 yrs     Type of heat: forced hot air     Type of fuel: electric     What type of jamin is in your bedroom: carpet jamin     Do you have the following in or near your home:    Air products: Humidifier in the bedroom/kitchen     Pests: none     Pets: none     Are pets allowed in bedroom: N/A     Open fields, wooded areas nearby: No     Basement: [id number] mold     Exposure to second hand smoke: No    Central air     Habits:    Caffeine: Hot tea 1 cup daily- ice tea 1 cup in the afternoon    Chocolate: Occasionally     Other:      Family History   Problem Relation Age of Onset    Lung cancer Mother     Brain cancer Mother     Diabetes Father     Depression Father     Other Father         septic     Diabetes Other     Allergies Sister     Hashimoto's thyroiditis Sister     Hodgkin's lymphoma Brother     Abdominal aortic aneurysm Sister     Diabetes Sister     No Known Problems Sister     No Known Problems Brother      Past Surgical History:   Procedure Laterality Date    COLONOSCOPY      EGD      EXPLORATORY LAPAROTOMY      for infertility    HAND SURGERY      Hand excision of tendon cyst    WISDOM TOOTH EXTRACTION         Current Outpatient Medications:     acetaminophen (TYLENOL) 325 mg tablet, Take 650 mg by mouth every 6 (six) hours as needed for mild pain, Disp: , Rfl:     aspirin 81 mg chewable tablet, Chew 1 tablet (81 mg total) daily, Disp: , Rfl: 0    buPROPion (WELLBUTRIN XL) 150 mg 24 hr tablet, Take 1 tablet (150 mg total) by mouth 2 (two) times a day, Disp: 60 tablet, Rfl: 3    celecoxib (CeleBREX) 100 mg capsule, Take 100 mg by mouth as needed , Disp: , Rfl:     cetirizine (ZyrTEC) 10 mg tablet, Take 1 tablet (10 mg total) by mouth daily for 365 doses, Disp: 30 tablet, Rfl: 11    Cholecalciferol (VITAMIN D-3) 125 MCG (5000 UT) TABS, Take 1 tablet by mouth daily OVER THE COUNTER, Disp: 30 tablet, Rfl: 11    DEXILANT 60 MG capsule, Take 1 capsule by mouth daily, Disp: , Rfl:     famotidine (PEPCID) 20 mg tablet, Take 1 tablet (20 mg total) by mouth 2 (two) times a day (Patient taking differently: Take 20 mg by mouth daily ), Disp: 60 tablet, Rfl: 11    fexofenadine (ALLEGRA) 180 MG tablet, Take 1 tablet (180 mg total) by mouth daily IN AM, Disp: 30 tablet, Rfl: 12    FLUoxetine (PROzac) 10 mg capsule, Take 1 capsule (10 mg total) by mouth daily, Disp: 90 capsule, Rfl: 1    fluticasone (FLONASE) 50 mcg/act nasal spray, 1 spray into each nostril 2 (two) times a day, Disp: 1 Bottle, Rfl: 3    fluticasone-salmeterol (ADVAIR DISKUS) 250-50 mcg/dose inhaler, Inhale 1 puff 2 (two) times a day Rinse mouth after use , Disp: 1 Inhaler, Rfl: 5    hydrochlorothiazide (HYDRODIURIL) 25 mg tablet, Take 1 tablet (25 mg total) by mouth daily, Disp: 30 tablet, Rfl: 3    hydrocortisone-pramoxine (PROCTOFOAM-HC) rectal foam, Insert 1 applicator into the rectum 2 (two) times a day, Disp: 10 g, Rfl: 1    LORazepam (ATIVAN) 0 5 mg tablet, Take 1 tablet (0 5 mg total) by mouth 2 (two) times a day as needed for anxiety, Disp: 30 tablet, Rfl: 0    metoprolol succinate (TOPROL-XL) 100 mg 24 hr tablet, Take 1 tablet (100 mg total) by mouth 2 (two) times a day, Disp: 60 tablet, Rfl: 3    MULTIPLE VITAMIN PO, Take by mouth, Disp: , Rfl:     econazole nitrate 1 % cream, Apply topically daily (Patient not taking: Reported on 2/6/2020), Disp: 30 g, Rfl: 1  Allergies   Allergen Reactions    Ace Inhibitors Angioedema  Beta Adrenergic Blockers Anaphylaxis     Anaphylaxis  Anaphylaxis    Amlodipine Edema    Atorvastatin Other (See Comments)     "throat closing up"    Barium Sulfate Other (See Comments)     hives throat closing    Benicar [Olmesartan] Angioedema     See Allergy note from 9/11/2008   Other Other (See Comments)     Other reaction(s): angioadema  Other reaction(s): Other (See Comments)  Preservatives- itching throat closes, hi  Other reaction(s): angioadema  E Z Cat - hives throat closes itching,  Preservatives- itching throat closes, hi    Sulfa Antibiotics Other (See Comments)     stuffiness,itching,hives,throat closing    Valsartan Angioedema       Labs:  Hospital Outpatient Visit on 03/12/2020   Component Date Value    Protocol Name 03/12/2020 IGNACIA     Time In Exercise Phase 03/12/2020 00:06:00     MAX  SYSTOLIC BP 17/07/4261 523     Max Diastolic Bp 18/49/0467 80     Max Heart Rate 03/12/2020 113     Max Predicted Heart Rate 03/12/2020 158     Reason for Termination 03/12/2020 MODERATE SOB     Test Indication 03/12/2020 PALPTATIONS, EXERTIONAL CP     Target Hr Formular 03/12/2020 (220 - Age)*100%     Arrhy During Ex 03/12/2020 none     Chest Pain Statement 03/12/2020 none    Orders Only on 01/24/2020   Component Date Value    PROTEIN UA 01/24/2020 20     EXT Creatinine Urine 01/24/2020 94     EXTERNAL Ur Prot/Creat R* 01/24/2020 0 213    Orders Only on 01/24/2020   Component Date Value    EXT Creatinine Urine 01/24/2020 92     Microalbum  ,U,Random 01/24/2020 0 8     EXTERNAL Microalb/Creat * 01/24/2020 9     Hemoglobin A1C 01/24/2020 5 8      Lab Results   Component Value Date    CHOL 413 (H) 11/13/2017    TRIG 163 (H) 05/14/2019    TRIG 159 (H) 11/13/2017    HDL 69 (H) 05/14/2019    HDL 94 11/13/2017     Imaging: No results found      Review of Systems:  Review of Systems   Constitutional: Negative for activity change, appetite change, chills, diaphoresis, fatigue and unexpected weight change  HENT: Negative for hearing loss, nosebleeds and sore throat  Eyes: Negative for photophobia and visual disturbance  Respiratory: Negative for cough, chest tightness, shortness of breath and wheezing  Cardiovascular: Positive for chest pain and palpitations  Negative for leg swelling  Gastrointestinal: Negative for abdominal pain, diarrhea, nausea and vomiting  Endocrine: Negative for polyuria  Genitourinary: Negative for dysuria, frequency and hematuria  Musculoskeletal: Negative for arthralgias, back pain, gait problem and neck pain  Skin: Negative for pallor and rash  Neurological: Negative for dizziness, syncope and headaches  Hematological: Does not bruise/bleed easily  Psychiatric/Behavioral: Negative for behavioral problems and confusion  Physical Exam:  Physical Exam   Constitutional: She is oriented to person, place, and time  She appears well-developed and well-nourished  HENT:   Head: Normocephalic and atraumatic  Nose: Nose normal    Eyes: Pupils are equal, round, and reactive to light  EOM are normal  No scleral icterus  Neck: Normal range of motion  Neck supple  No JVD present  Cardiovascular: Normal rate, regular rhythm and normal heart sounds  Exam reveals no gallop and no friction rub  No murmur heard  Pulmonary/Chest: Effort normal and breath sounds normal  No respiratory distress  She has no wheezes  She has no rales  Abdominal: Soft  Bowel sounds are normal  She exhibits no distension  There is no tenderness  Musculoskeletal: Normal range of motion  She exhibits no edema or deformity  Neurological: She is alert and oriented to person, place, and time  No cranial nerve deficit  Skin: Skin is warm and dry  No rash noted  She is not diaphoretic  Psychiatric: She has a normal mood and affect  Her behavior is normal    Vitals reviewed      Blood pressure 120/74, pulse 60, height 5' 4" (1 626 m), weight 96 1 kg (211 lb 14 4 oz), SpO2 98 %, not currently breastfeeding  Discussion/Summary:  Chest pain/palpitations: stress test revealed no significant changes suggesting ischemic disease  A Holter monitor revealed new onset atrial fibrillation  Echo to assess for structural changes revealed normal LV function and mild to mod AR  HTN: currently on high dose Toprol XL, but with very elevated BP at last visit  BP has since much improved since stopping steroids  HLD: currently not on medical therapy  Last levels were measured May of 2019 with LDL of 245 HDL 69  and   Recheck at this time to assess for any changes was just done - ordered in her chart  New onset afib: with paroxysms noted on Holter monitor  Rates controlled with Toprol  CHADS2-Vasc score of 2 for female and HTN  Will recommend that she start on anticoagulation with Eliquis 5mg BID for stroke prophylaxis

## 2020-03-19 NOTE — PATIENT INSTRUCTIONS

## 2020-03-26 DIAGNOSIS — F32.A ANXIETY AND DEPRESSION: ICD-10-CM

## 2020-03-26 DIAGNOSIS — F41.9 ANXIETY AND DEPRESSION: ICD-10-CM

## 2020-03-26 DIAGNOSIS — L30.4 INTERTRIGO: ICD-10-CM

## 2020-03-26 RX ORDER — LORAZEPAM 0.5 MG/1
TABLET ORAL
Qty: 30 TABLET | Refills: 0 | Status: SHIPPED | OUTPATIENT
Start: 2020-03-26 | End: 2020-04-09 | Stop reason: SDUPTHER

## 2020-04-09 ENCOUNTER — OFFICE VISIT (OUTPATIENT)
Dept: FAMILY MEDICINE CLINIC | Facility: CLINIC | Age: 60
End: 2020-04-09
Payer: COMMERCIAL

## 2020-04-09 ENCOUNTER — TELEPHONE (OUTPATIENT)
Dept: FAMILY MEDICINE CLINIC | Facility: CLINIC | Age: 60
End: 2020-04-09

## 2020-04-09 VITALS
SYSTOLIC BLOOD PRESSURE: 114 MMHG | BODY MASS INDEX: 36.16 KG/M2 | HEIGHT: 64 IN | DIASTOLIC BLOOD PRESSURE: 76 MMHG | TEMPERATURE: 98.1 F | WEIGHT: 211.8 LBS

## 2020-04-09 DIAGNOSIS — J30.1 SEASONAL ALLERGIC RHINITIS DUE TO POLLEN: ICD-10-CM

## 2020-04-09 DIAGNOSIS — K64.9 HEMORRHOIDS, UNSPECIFIED HEMORRHOID TYPE: ICD-10-CM

## 2020-04-09 DIAGNOSIS — R00.2 PALPITATION: ICD-10-CM

## 2020-04-09 DIAGNOSIS — I10 ESSENTIAL HYPERTENSION: ICD-10-CM

## 2020-04-09 DIAGNOSIS — B37.3 VAGINAL CANDIDIASIS: ICD-10-CM

## 2020-04-09 DIAGNOSIS — F32.A ANXIETY AND DEPRESSION: ICD-10-CM

## 2020-04-09 DIAGNOSIS — E78.49 FAMILIAL HYPERLIPIDEMIA: ICD-10-CM

## 2020-04-09 DIAGNOSIS — I48.0 PAROXYSMAL ATRIAL FIBRILLATION (HCC): Primary | ICD-10-CM

## 2020-04-09 DIAGNOSIS — F41.9 ANXIETY AND DEPRESSION: ICD-10-CM

## 2020-04-09 PROBLEM — B37.31 VAGINAL CANDIDIASIS: Status: ACTIVE | Noted: 2020-04-09

## 2020-04-09 PROCEDURE — 3008F BODY MASS INDEX DOCD: CPT | Performed by: FAMILY MEDICINE

## 2020-04-09 PROCEDURE — 3078F DIAST BP <80 MM HG: CPT | Performed by: FAMILY MEDICINE

## 2020-04-09 PROCEDURE — 99214 OFFICE O/P EST MOD 30 MIN: CPT | Performed by: FAMILY MEDICINE

## 2020-04-09 PROCEDURE — 3074F SYST BP LT 130 MM HG: CPT | Performed by: FAMILY MEDICINE

## 2020-04-09 PROCEDURE — 1036F TOBACCO NON-USER: CPT | Performed by: FAMILY MEDICINE

## 2020-04-09 RX ORDER — FLUOXETINE HYDROCHLORIDE 20 MG/1
20 CAPSULE ORAL DAILY
Qty: 90 CAPSULE | Refills: 0 | Status: SHIPPED | OUTPATIENT
Start: 2020-04-09 | End: 2020-05-13 | Stop reason: SDUPTHER

## 2020-04-09 RX ORDER — BUPROPION HYDROCHLORIDE 150 MG/1
150 TABLET ORAL 2 TIMES DAILY
Qty: 180 TABLET | Refills: 1 | Status: SHIPPED | OUTPATIENT
Start: 2020-04-09 | End: 2020-08-31 | Stop reason: SDUPTHER

## 2020-04-09 RX ORDER — FLUTICASONE PROPIONATE 50 MCG
1 SPRAY, SUSPENSION (ML) NASAL 2 TIMES DAILY
Qty: 3 BOTTLE | Refills: 1 | Status: SHIPPED | OUTPATIENT
Start: 2020-04-09 | End: 2020-09-30

## 2020-04-09 RX ORDER — PHENAZOPYRIDINE HYDROCHLORIDE 200 MG/1
200 TABLET, FILM COATED ORAL AS NEEDED
COMMUNITY
Start: 2020-02-22 | End: 2020-04-09

## 2020-04-09 RX ORDER — SUCRALFATE 1 G/10ML
10 SUSPENSION ORAL 2 TIMES DAILY
COMMUNITY
Start: 2020-03-25 | End: 2021-01-27 | Stop reason: SDUPTHER

## 2020-04-09 RX ORDER — NITROFURANTOIN MACROCRYSTALS 100 MG/1
100 CAPSULE ORAL AS NEEDED
COMMUNITY
Start: 2020-02-22 | End: 2020-04-09

## 2020-04-09 RX ORDER — FLUCONAZOLE 150 MG/1
150 TABLET ORAL ONCE
Qty: 2 TABLET | Refills: 0 | Status: SHIPPED | OUTPATIENT
Start: 2020-04-09 | End: 2020-04-09

## 2020-04-09 RX ORDER — LORAZEPAM 0.5 MG/1
0.5 TABLET ORAL 2 TIMES DAILY
Qty: 60 TABLET | Refills: 0 | Status: SHIPPED | OUTPATIENT
Start: 2020-04-09 | End: 2020-05-05

## 2020-04-13 DIAGNOSIS — I48.0 PAROXYSMAL ATRIAL FIBRILLATION (HCC): ICD-10-CM

## 2020-04-15 DIAGNOSIS — I10 ESSENTIAL HYPERTENSION: ICD-10-CM

## 2020-04-15 RX ORDER — METOPROLOL SUCCINATE 100 MG/1
100 TABLET, EXTENDED RELEASE ORAL 2 TIMES DAILY
Qty: 180 TABLET | Refills: 1 | Status: SHIPPED | OUTPATIENT
Start: 2020-04-15 | End: 2020-07-09 | Stop reason: SDUPTHER

## 2020-04-17 ENCOUNTER — TELEPHONE (OUTPATIENT)
Dept: FAMILY MEDICINE CLINIC | Facility: CLINIC | Age: 60
End: 2020-04-17

## 2020-04-17 DIAGNOSIS — N76.0 ACUTE VAGINITIS: Primary | ICD-10-CM

## 2020-04-20 RX ORDER — METRONIDAZOLE 500 MG/1
500 TABLET ORAL 2 TIMES DAILY
Qty: 14 TABLET | Refills: 0
Start: 2020-04-20 | End: 2020-04-27

## 2020-04-27 DIAGNOSIS — F41.9 ANXIETY AND DEPRESSION: ICD-10-CM

## 2020-04-27 DIAGNOSIS — F32.A ANXIETY AND DEPRESSION: ICD-10-CM

## 2020-04-27 RX ORDER — FLUOXETINE 10 MG/1
CAPSULE ORAL
Qty: 90 CAPSULE | Refills: 0 | OUTPATIENT
Start: 2020-04-27

## 2020-05-04 DIAGNOSIS — F41.9 ANXIETY AND DEPRESSION: ICD-10-CM

## 2020-05-04 DIAGNOSIS — F32.A ANXIETY AND DEPRESSION: ICD-10-CM

## 2020-05-05 RX ORDER — LORAZEPAM 0.5 MG/1
TABLET ORAL
Qty: 60 TABLET | Refills: 0 | Status: SHIPPED | OUTPATIENT
Start: 2020-05-05 | End: 2020-05-26 | Stop reason: SDUPTHER

## 2020-05-13 ENCOUNTER — OFFICE VISIT (OUTPATIENT)
Dept: FAMILY MEDICINE CLINIC | Facility: CLINIC | Age: 60
End: 2020-05-13
Payer: COMMERCIAL

## 2020-05-13 VITALS
BODY MASS INDEX: 36.88 KG/M2 | HEART RATE: 64 BPM | WEIGHT: 216 LBS | SYSTOLIC BLOOD PRESSURE: 150 MMHG | HEIGHT: 64 IN | DIASTOLIC BLOOD PRESSURE: 88 MMHG

## 2020-05-13 DIAGNOSIS — F32.A ANXIETY AND DEPRESSION: ICD-10-CM

## 2020-05-13 DIAGNOSIS — F41.9 ANXIETY AND DEPRESSION: ICD-10-CM

## 2020-05-13 DIAGNOSIS — F51.01 PRIMARY INSOMNIA: Primary | ICD-10-CM

## 2020-05-13 PROCEDURE — 99213 OFFICE O/P EST LOW 20 MIN: CPT | Performed by: FAMILY MEDICINE

## 2020-05-13 PROCEDURE — 3077F SYST BP >= 140 MM HG: CPT | Performed by: FAMILY MEDICINE

## 2020-05-13 PROCEDURE — 3008F BODY MASS INDEX DOCD: CPT | Performed by: FAMILY MEDICINE

## 2020-05-13 PROCEDURE — 3079F DIAST BP 80-89 MM HG: CPT | Performed by: FAMILY MEDICINE

## 2020-05-13 PROCEDURE — 1036F TOBACCO NON-USER: CPT | Performed by: FAMILY MEDICINE

## 2020-05-13 RX ORDER — CYANOCOBALAMIN 1000 UG/ML
INJECTION INTRAMUSCULAR; SUBCUTANEOUS
COMMUNITY
Start: 2020-04-24 | End: 2020-07-31

## 2020-05-13 RX ORDER — FLUOXETINE 10 MG/1
10 CAPSULE ORAL DAILY
Qty: 90 CAPSULE | Refills: 1
Start: 2020-05-13 | End: 2020-07-09 | Stop reason: SDUPTHER

## 2020-05-26 DIAGNOSIS — F32.A ANXIETY AND DEPRESSION: ICD-10-CM

## 2020-05-26 DIAGNOSIS — F41.9 ANXIETY AND DEPRESSION: ICD-10-CM

## 2020-05-26 RX ORDER — LORAZEPAM 0.5 MG/1
0.5 TABLET ORAL 2 TIMES DAILY
Qty: 60 TABLET | Refills: 0 | Status: SHIPPED | OUTPATIENT
Start: 2020-05-26 | End: 2020-06-16 | Stop reason: SDUPTHER

## 2020-05-27 ENCOUNTER — TELEPHONE (OUTPATIENT)
Dept: FAMILY MEDICINE CLINIC | Facility: CLINIC | Age: 60
End: 2020-05-27

## 2020-06-10 ENCOUNTER — TELEPHONE (OUTPATIENT)
Dept: CARDIOLOGY CLINIC | Facility: CLINIC | Age: 60
End: 2020-06-10

## 2020-06-11 ENCOUNTER — OFFICE VISIT (OUTPATIENT)
Dept: CARDIOLOGY CLINIC | Facility: CLINIC | Age: 60
End: 2020-06-11
Payer: COMMERCIAL

## 2020-06-11 VITALS
SYSTOLIC BLOOD PRESSURE: 128 MMHG | HEIGHT: 64 IN | BODY MASS INDEX: 36.96 KG/M2 | WEIGHT: 216.5 LBS | OXYGEN SATURATION: 98 % | DIASTOLIC BLOOD PRESSURE: 80 MMHG | HEART RATE: 59 BPM

## 2020-06-11 DIAGNOSIS — R06.00 DOE (DYSPNEA ON EXERTION): ICD-10-CM

## 2020-06-11 DIAGNOSIS — E78.2 MIXED HYPERLIPIDEMIA: ICD-10-CM

## 2020-06-11 DIAGNOSIS — I10 BENIGN ESSENTIAL HYPERTENSION: ICD-10-CM

## 2020-06-11 DIAGNOSIS — I48.0 PAROXYSMAL ATRIAL FIBRILLATION (HCC): Primary | ICD-10-CM

## 2020-06-11 PROBLEM — R06.09 DOE (DYSPNEA ON EXERTION): Status: ACTIVE | Noted: 2020-06-11

## 2020-06-11 PROCEDURE — 99214 OFFICE O/P EST MOD 30 MIN: CPT | Performed by: INTERNAL MEDICINE

## 2020-06-11 PROCEDURE — 1036F TOBACCO NON-USER: CPT | Performed by: INTERNAL MEDICINE

## 2020-06-11 PROCEDURE — 93000 ELECTROCARDIOGRAM COMPLETE: CPT | Performed by: INTERNAL MEDICINE

## 2020-06-11 PROCEDURE — 3008F BODY MASS INDEX DOCD: CPT | Performed by: INTERNAL MEDICINE

## 2020-06-11 PROCEDURE — 3074F SYST BP LT 130 MM HG: CPT | Performed by: INTERNAL MEDICINE

## 2020-06-11 PROCEDURE — 3079F DIAST BP 80-89 MM HG: CPT | Performed by: INTERNAL MEDICINE

## 2020-06-12 LAB
THYROGLOB AB SERPL-ACNC: <1 IU/ML
THYROPEROXIDASE AB SERPL-ACNC: 1 IU/ML
TSH SERPL-ACNC: 2.78 MIU/L (ref 0.4–4.5)

## 2020-06-16 ENCOUNTER — HOSPITAL ENCOUNTER (OUTPATIENT)
Dept: NON INVASIVE DIAGNOSTICS | Facility: CLINIC | Age: 60
Discharge: HOME | End: 2020-06-16
Payer: COMMERCIAL

## 2020-06-16 DIAGNOSIS — F32.A ANXIETY AND DEPRESSION: ICD-10-CM

## 2020-06-16 DIAGNOSIS — R06.00 DOE (DYSPNEA ON EXERTION): ICD-10-CM

## 2020-06-16 DIAGNOSIS — I48.0 PAROXYSMAL ATRIAL FIBRILLATION (HCC): ICD-10-CM

## 2020-06-16 DIAGNOSIS — F41.9 ANXIETY AND DEPRESSION: ICD-10-CM

## 2020-06-16 LAB
MAX DIASTOLIC BP: 98 MMHG
MAX HEART RATE: 94 BPM
MAX PREDICTED HEART RATE: 158 BPM
MAX. SYSTOLIC BP: 160 MMHG
PROTOCOL NAME: NORMAL
REASON FOR TERMINATION: NORMAL
TARGET HR FORMULA: NORMAL
TEST INDICATION: NORMAL
TIME IN EXERCISE PHASE: NORMAL

## 2020-06-16 PROCEDURE — 78452 HT MUSCLE IMAGE SPECT MULT: CPT | Performed by: INTERNAL MEDICINE

## 2020-06-16 PROCEDURE — 78452 HT MUSCLE IMAGE SPECT MULT: CPT

## 2020-06-16 PROCEDURE — A9502 TC99M TETROFOSMIN: HCPCS

## 2020-06-16 PROCEDURE — 93018 CV STRESS TEST I&R ONLY: CPT | Performed by: INTERNAL MEDICINE

## 2020-06-16 PROCEDURE — 93016 CV STRESS TEST SUPVJ ONLY: CPT | Performed by: INTERNAL MEDICINE

## 2020-06-16 PROCEDURE — 93017 CV STRESS TEST TRACING ONLY: CPT

## 2020-06-16 RX ORDER — LORAZEPAM 0.5 MG/1
0.5 TABLET ORAL 2 TIMES DAILY
Qty: 60 TABLET | Refills: 0 | Status: SHIPPED | OUTPATIENT
Start: 2020-06-16 | End: 2020-07-21 | Stop reason: SDUPTHER

## 2020-06-16 RX ORDER — ESZOPICLONE 2 MG/1
2 TABLET, FILM COATED ORAL
Qty: 30 TABLET | Refills: 0 | Status: SHIPPED | OUTPATIENT
Start: 2020-06-16 | End: 2020-07-31

## 2020-06-16 RX ADMIN — REGADENOSON 0.4 MG: 0.08 INJECTION, SOLUTION INTRAVENOUS at 13:36

## 2020-06-17 ENCOUNTER — TELEPHONE (OUTPATIENT)
Dept: CARDIOLOGY CLINIC | Facility: CLINIC | Age: 60
End: 2020-06-17

## 2020-07-09 ENCOUNTER — OFFICE VISIT (OUTPATIENT)
Dept: FAMILY MEDICINE CLINIC | Facility: CLINIC | Age: 60
End: 2020-07-09
Payer: COMMERCIAL

## 2020-07-09 VITALS
BODY MASS INDEX: 37.56 KG/M2 | DIASTOLIC BLOOD PRESSURE: 70 MMHG | HEIGHT: 64 IN | HEART RATE: 77 BPM | WEIGHT: 220 LBS | SYSTOLIC BLOOD PRESSURE: 124 MMHG

## 2020-07-09 DIAGNOSIS — F32.A ANXIETY AND DEPRESSION: ICD-10-CM

## 2020-07-09 DIAGNOSIS — E78.2 MIXED HYPERLIPIDEMIA: ICD-10-CM

## 2020-07-09 DIAGNOSIS — F41.9 ANXIETY AND DEPRESSION: ICD-10-CM

## 2020-07-09 DIAGNOSIS — I10 BENIGN ESSENTIAL HYPERTENSION: Primary | ICD-10-CM

## 2020-07-09 DIAGNOSIS — I48.0 PAROXYSMAL ATRIAL FIBRILLATION (HCC): ICD-10-CM

## 2020-07-09 DIAGNOSIS — K21.9 GASTROESOPHAGEAL REFLUX DISEASE WITHOUT ESOPHAGITIS: ICD-10-CM

## 2020-07-09 DIAGNOSIS — I10 ESSENTIAL HYPERTENSION: ICD-10-CM

## 2020-07-09 DIAGNOSIS — L65.9 HAIR LOSS: ICD-10-CM

## 2020-07-09 DIAGNOSIS — I20.9 ANGINA PECTORIS (HCC): ICD-10-CM

## 2020-07-09 DIAGNOSIS — N30.10 INTERSTITIAL CYSTITIS: ICD-10-CM

## 2020-07-09 DIAGNOSIS — E78.49 FAMILIAL HYPERLIPIDEMIA: ICD-10-CM

## 2020-07-09 PROCEDURE — 3078F DIAST BP <80 MM HG: CPT | Performed by: FAMILY MEDICINE

## 2020-07-09 PROCEDURE — 99214 OFFICE O/P EST MOD 30 MIN: CPT | Performed by: FAMILY MEDICINE

## 2020-07-09 PROCEDURE — 3008F BODY MASS INDEX DOCD: CPT | Performed by: FAMILY MEDICINE

## 2020-07-09 PROCEDURE — 1036F TOBACCO NON-USER: CPT | Performed by: FAMILY MEDICINE

## 2020-07-09 PROCEDURE — 3074F SYST BP LT 130 MM HG: CPT | Performed by: FAMILY MEDICINE

## 2020-07-09 RX ORDER — PHENAZOPYRIDINE HYDROCHLORIDE 200 MG/1
200 TABLET, FILM COATED ORAL
Qty: 10 TABLET | Refills: 2 | Status: SHIPPED | OUTPATIENT
Start: 2020-07-09 | End: 2020-07-31 | Stop reason: SDUPTHER

## 2020-07-09 RX ORDER — METOPROLOL SUCCINATE 100 MG/1
100 TABLET, EXTENDED RELEASE ORAL 2 TIMES DAILY
Qty: 180 TABLET | Refills: 1 | Status: SHIPPED | OUTPATIENT
Start: 2020-07-09 | End: 2020-12-29

## 2020-07-09 RX ORDER — CIPROFLOXACIN 500 MG/1
500 TABLET, FILM COATED ORAL EVERY 12 HOURS SCHEDULED
Qty: 14 TABLET | Refills: 0 | Status: SHIPPED | OUTPATIENT
Start: 2020-07-09 | End: 2020-07-16

## 2020-07-09 RX ORDER — FLUOXETINE 10 MG/1
10 CAPSULE ORAL DAILY
Qty: 90 CAPSULE | Refills: 1 | Status: SHIPPED | OUTPATIENT
Start: 2020-07-09 | End: 2021-01-03

## 2020-07-09 NOTE — PROGRESS NOTES
Assessment/Plan:  Patient see urology  Patient use Cipro and Pyridium as directed  Blood pressure, cholesterol stable at this time  AFib, CAD stable at this time  Patient status post stress test with Cardiology  Depression relatively stable overall  Patient will call Cardiology about switching Eliquis due to cost   Follow-up in 3 months  Patient use minoxidil for hair loss  Patient go for laboratory studies  Diagnoses and all orders for this visit:    Benign essential hypertension  -     CBC and differential; Future  -     TSH, 3rd generation with Free T4 reflex; Future  -     Comprehensive metabolic panel; Future  -     Lipid panel; Future  -     Ferritin; Future    Angina pectoris (HCC)    Paroxysmal atrial fibrillation (HCC)  -     CBC and differential; Future  -     TSH, 3rd generation with Free T4 reflex; Future  -     Comprehensive metabolic panel; Future  -     Lipid panel; Future  -     Ferritin; Future    Mixed hyperlipidemia  -     CBC and differential; Future  -     TSH, 3rd generation with Free T4 reflex; Future  -     Comprehensive metabolic panel; Future  -     Lipid panel; Future  -     Ferritin; Future    Familial hyperlipidemia    Gastroesophageal reflux disease without esophagitis    Anxiety and depression  -     FLUoxetine (PROzac) 10 mg capsule; Take 1 capsule (10 mg total) by mouth daily    Interstitial cystitis  -     Ambulatory referral to Urology; Future  -     ciprofloxacin (CIPRO) 500 mg tablet; Take 1 tablet (500 mg total) by mouth every 12 (twelve) hours for 7 days  -     phenazopyridine (Pyridium) 200 mg tablet; Take 1 tablet (200 mg total) by mouth 3 (three) times a day with meals    Hair loss  -     CBC and differential; Future  -     TSH, 3rd generation with Free T4 reflex; Future  -     Comprehensive metabolic panel; Future  -     Lipid panel; Future  -     Ferritin; Future    Essential hypertension  -     metoprolol succinate (TOPROL-XL) 100 mg 24 hr tablet;  Take 1 tablet (100 mg total) by mouth 2 (two) times a day  -     CBC and differential; Future  -     TSH, 3rd generation with Free T4 reflex; Future  -     Comprehensive metabolic panel; Future  -     Lipid panel; Future  -     Ferritin; Future            Subjective:        Patient ID: Giselle Maldonado is a 56 y o  female  Patient here to follow-up on AFib, CAD, hypertension hyperlipidemia, GERD, anxiety and depression  Depression relatively stable overall  Patient does have some insomnia  Patient is taking Lunesta 2 mg daily  Patient still getting some palpitations  Patient did see Cardiology and had stress test   No severe chest pain  No syncope or dizziness noted  Normal defecation  Patient with history of interstitial cystitis  The following portions of the patient's history were reviewed and updated as appropriate: allergies, current medications, past family history, past medical history, past social history, past surgical history and problem list       Review of Systems   Constitutional: Negative  HENT: Negative  Eyes: Negative  Respiratory: Negative  Cardiovascular: Positive for palpitations  Gastrointestinal: Negative  Endocrine: Negative  Genitourinary: Positive for difficulty urinating  Musculoskeletal: Negative  Skin: Negative  Allergic/Immunologic: Negative  Neurological: Negative  Hematological: Negative  Psychiatric/Behavioral: Negative  Objective:               /70 (BP Location: Right arm, Patient Position: Sitting, Cuff Size: Standard)   Pulse 77   Ht 5' 4" (1 626 m)   Wt 99 8 kg (220 lb)   LMP  (LMP Unknown)   BMI 37 76 kg/m²          Physical Exam   Constitutional: She appears well-developed and well-nourished  No distress  HENT:   Head: Normocephalic  Right Ear: External ear normal    Left Ear: External ear normal    Mouth/Throat: Oropharynx is clear and moist  No oropharyngeal exudate     Eyes: Pupils are equal, round, and reactive to light  EOM are normal  Right eye exhibits no discharge  Left eye exhibits no discharge  No scleral icterus  Neck: Normal range of motion  Neck supple  No thyromegaly present  Cardiovascular: Normal rate, regular rhythm, normal heart sounds and intact distal pulses  Exam reveals no gallop and no friction rub  No murmur heard  Pulmonary/Chest: Effort normal and breath sounds normal  No respiratory distress  She has no wheezes  She has no rales  She exhibits no tenderness  Abdominal: Soft  Bowel sounds are normal  She exhibits no distension  There is no tenderness  There is no rebound and no guarding  Musculoskeletal: Normal range of motion  She exhibits no edema or tenderness  Lymphadenopathy:     She has no cervical adenopathy  Neurological: She is alert  No cranial nerve deficit  She exhibits normal muscle tone  Coordination normal    Skin: Skin is warm and dry  No rash noted  She is not diaphoretic  No erythema  No pallor  Hair loss temporal region   Psychiatric: She has a normal mood and affect  Her behavior is normal  Judgment and thought content normal    Nursing note and vitals reviewed

## 2020-07-17 DIAGNOSIS — I48.0 PAROXYSMAL ATRIAL FIBRILLATION (HCC): ICD-10-CM

## 2020-07-17 DIAGNOSIS — N30.90 CYSTITIS: Primary | ICD-10-CM

## 2020-07-17 RX ORDER — NITROFURANTOIN 25; 75 MG/1; MG/1
100 CAPSULE ORAL 2 TIMES DAILY
Qty: 20 CAPSULE | Refills: 0 | Status: SHIPPED | OUTPATIENT
Start: 2020-07-17 | End: 2020-07-27

## 2020-07-17 NOTE — TELEPHONE ENCOUNTER
PT STATES SHE IS STILL HAVING A LOT OF URINARY PAIN AND DISCOMFORT  FINISHED CIPRO  WANTS TO KNOW IF SHE CAN HAVE ANOTHER ANTIBIOTIC  SHE SEES UROLOGY, DR GREENE ON AUGUST 20TH    SHE WANTS TO KNOW IF YOU COULD TRY TO GET HER IN SOONER?

## 2020-07-21 DIAGNOSIS — F41.9 ANXIETY AND DEPRESSION: ICD-10-CM

## 2020-07-21 DIAGNOSIS — F32.A ANXIETY AND DEPRESSION: ICD-10-CM

## 2020-07-21 RX ORDER — LORAZEPAM 0.5 MG/1
0.5 TABLET ORAL 2 TIMES DAILY
Qty: 60 TABLET | Refills: 0 | Status: SHIPPED | OUTPATIENT
Start: 2020-07-21 | End: 2020-08-20 | Stop reason: SDUPTHER

## 2020-07-21 NOTE — TELEPHONE ENCOUNTER
Patient called states she does not need eliquis samples  She went to the [employer] and printed out copay card   4 boxes of samples lot # [id number] , exp 04/2022 returned to cabinet

## 2020-07-29 ENCOUNTER — TELEPHONE (OUTPATIENT)
Dept: FAMILY MEDICINE CLINIC | Facility: CLINIC | Age: 60
End: 2020-07-29

## 2020-07-29 NOTE — TELEPHONE ENCOUNTER
Spoke with patient  She has used all Pyridium and refills since filled on 7/9/20  She still has burning on urination and has Macrobid she is finishing  She has appt with Urology on 8/20/20  Please advise what you would like to do  Thank you

## 2020-07-29 NOTE — TELEPHONE ENCOUNTER
Patient called in for a refill on Pyridium and a script was sent in on 7-9-2020 and I was just checking if the patient already picked that up and the refill? Left message for patient to contact office

## 2020-07-31 ENCOUNTER — OFFICE VISIT (OUTPATIENT)
Dept: FAMILY MEDICINE CLINIC | Facility: CLINIC | Age: 60
End: 2020-07-31
Payer: COMMERCIAL

## 2020-07-31 VITALS
HEIGHT: 64 IN | BODY MASS INDEX: 37.39 KG/M2 | SYSTOLIC BLOOD PRESSURE: 114 MMHG | TEMPERATURE: 97.2 F | DIASTOLIC BLOOD PRESSURE: 78 MMHG | WEIGHT: 219 LBS

## 2020-07-31 DIAGNOSIS — M76.52 PATELLAR TENDINITIS OF LEFT KNEE: Primary | ICD-10-CM

## 2020-07-31 DIAGNOSIS — N30.10 INTERSTITIAL CYSTITIS: ICD-10-CM

## 2020-07-31 PROCEDURE — 3078F DIAST BP <80 MM HG: CPT | Performed by: FAMILY MEDICINE

## 2020-07-31 PROCEDURE — 99213 OFFICE O/P EST LOW 20 MIN: CPT | Performed by: FAMILY MEDICINE

## 2020-07-31 PROCEDURE — 1036F TOBACCO NON-USER: CPT | Performed by: FAMILY MEDICINE

## 2020-07-31 PROCEDURE — 3008F BODY MASS INDEX DOCD: CPT | Performed by: FAMILY MEDICINE

## 2020-07-31 PROCEDURE — 3074F SYST BP LT 130 MM HG: CPT | Performed by: FAMILY MEDICINE

## 2020-07-31 RX ORDER — PREDNISONE 10 MG/1
TABLET ORAL
Qty: 10 TABLET | Refills: 0 | Status: SHIPPED | OUTPATIENT
Start: 2020-07-31 | End: 2020-11-04

## 2020-07-31 RX ORDER — PHENAZOPYRIDINE HYDROCHLORIDE 200 MG/1
200 TABLET, FILM COATED ORAL
Qty: 30 TABLET | Refills: 1 | Status: SHIPPED | OUTPATIENT
Start: 2020-07-31 | End: 2020-09-17 | Stop reason: SDUPTHER

## 2020-07-31 RX ORDER — NITROFURANTOIN 25; 75 MG/1; MG/1
100 CAPSULE ORAL 2 TIMES DAILY
COMMUNITY
End: 2020-08-20

## 2020-07-31 NOTE — PROGRESS NOTES
Assessment/Plan:  Patient use ice 10 minutes at a time 3 times a day to left knee  Patient use Voltaren gel as directed  Patient use prednisone taper as noted  Patient use pretty med as needed for interstitial cystitis  Patient will be seeing Urology on August 20th  Diagnoses and all orders for this visit:    Patellar tendinitis of left knee  -     predniSONE 10 mg tablet; 2 tablets daily for 3 days then 1 tablet daily  -     diclofenac sodium (VOLTAREN) 1 %; Apply 2 g topically 4 (four) times a day    Interstitial cystitis  -     phenazopyridine (Pyridium) 200 mg tablet; Take 1 tablet (200 mg total) by mouth 3 (three) times a day with meals    Other orders  -     nitrofurantoin (Macrobid) 100 mg capsule; Take 100 mg by mouth 2 (two) times a day            Subjective:        Patient ID: Quinn Delatorre is a 56 y o  female  Patient is here with ongoing bladder spasms and irritation related to interstitial cystitis  Patient will be seeing Urology on August 20th  Patient with ongoing pain discomfort  No hematuria  No fevers or chills  Patient is using pretty in with decent results but ran out  Patient is on [facility] presently  Patient also with some left knee pain anteriorly  The patient follow-up knee 3 weeks ago  Patient is using Voltaren gel  Patient has pain with kneeling on it  The no difficulty with ambulation  No locking or giving way  The following portions of the patient's history were reviewed and updated as appropriate: allergies, current medications, past family history, past medical history, past social history, past surgical history and problem list       Review of Systems   Constitutional: Negative  HENT: Negative  Eyes: Negative  Respiratory: Negative  Cardiovascular: Negative  Gastrointestinal: Negative  Endocrine: Negative  Genitourinary: Positive for difficulty urinating and dysuria  Musculoskeletal: Positive for arthralgias  Skin: Negative  Allergic/Immunologic: Negative  Neurological: Negative  Hematological: Negative  Psychiatric/Behavioral: Negative  Objective:               /78 (BP Location: Right arm, Patient Position: Sitting, Cuff Size: Adult)   Temp (!) 97 2 °F (36 2 °C) (Tympanic)   Ht 5' 4" (1 626 m)   Wt 99 3 kg (219 lb)   LMP  (LMP Unknown)   BMI 37 59 kg/m²          Physical Exam   Constitutional: She appears well-developed and well-nourished  HENT:   Head: Normocephalic and atraumatic  Cardiovascular: Normal rate, regular rhythm and normal heart sounds  Pulmonary/Chest: Effort normal and breath sounds normal    Musculoskeletal:   Left knee mild joint line tenderness laterally  No medial tenderness  Patient does have pain over tibial tuberosity and patellar tendon  No pain over patella itself  Negative Lachman test   Negative testing MCL and LCL  Neurological: She is alert  Nursing note and vitals reviewed

## 2020-08-07 ENCOUNTER — TELEPHONE (OUTPATIENT)
Dept: FAMILY MEDICINE CLINIC | Facility: CLINIC | Age: 60
End: 2020-08-07

## 2020-08-07 DIAGNOSIS — M76.52 PATELLAR TENDINITIS OF LEFT KNEE: Primary | ICD-10-CM

## 2020-08-07 NOTE — TELEPHONE ENCOUNTER
Pt wanted to let you know that the Prednisone did help some and is requesting to get another script if possible      Please Advise

## 2020-08-07 NOTE — TELEPHONE ENCOUNTER
Prednisone 10 mg tablets    2 tablets daily for 1 week then 1 tablet daily for 1 week then stop Number 21 no refills

## 2020-08-11 RX ORDER — PREDNISONE 10 MG/1
TABLET ORAL
Qty: 21 TABLET | Refills: 0
Start: 2020-08-11 | End: 2020-11-04

## 2020-08-20 ENCOUNTER — APPOINTMENT (OUTPATIENT)
Dept: LAB | Facility: MEDICAL CENTER | Age: 60
End: 2020-08-20
Payer: COMMERCIAL

## 2020-08-20 ENCOUNTER — CONSULT (OUTPATIENT)
Dept: UROLOGY | Facility: CLINIC | Age: 60
End: 2020-08-20
Payer: COMMERCIAL

## 2020-08-20 VITALS
DIASTOLIC BLOOD PRESSURE: 86 MMHG | HEIGHT: 64 IN | SYSTOLIC BLOOD PRESSURE: 142 MMHG | BODY MASS INDEX: 38.82 KG/M2 | TEMPERATURE: 96.8 F | WEIGHT: 227.4 LBS | HEART RATE: 64 BPM

## 2020-08-20 DIAGNOSIS — F41.9 ANXIETY AND DEPRESSION: ICD-10-CM

## 2020-08-20 DIAGNOSIS — E78.2 MIXED HYPERLIPIDEMIA: ICD-10-CM

## 2020-08-20 DIAGNOSIS — I10 ESSENTIAL HYPERTENSION: ICD-10-CM

## 2020-08-20 DIAGNOSIS — F32.A ANXIETY AND DEPRESSION: ICD-10-CM

## 2020-08-20 DIAGNOSIS — L65.9 HAIR LOSS: ICD-10-CM

## 2020-08-20 DIAGNOSIS — I48.0 PAROXYSMAL ATRIAL FIBRILLATION (HCC): ICD-10-CM

## 2020-08-20 DIAGNOSIS — R79.89 LOW TSH LEVEL: ICD-10-CM

## 2020-08-20 DIAGNOSIS — I10 BENIGN ESSENTIAL HYPERTENSION: ICD-10-CM

## 2020-08-20 DIAGNOSIS — N30.10 INTERSTITIAL CYSTITIS: Primary | ICD-10-CM

## 2020-08-20 LAB
ALBUMIN SERPL BCP-MCNC: 3.9 G/DL (ref 3.5–5)
ALP SERPL-CCNC: 94 U/L (ref 46–116)
ALT SERPL W P-5'-P-CCNC: 29 U/L (ref 12–78)
ANION GAP SERPL CALCULATED.3IONS-SCNC: 6 MMOL/L (ref 4–13)
AST SERPL W P-5'-P-CCNC: 9 U/L (ref 5–45)
BASOPHILS # BLD AUTO: 0.04 THOUSANDS/ΜL (ref 0–0.1)
BASOPHILS NFR BLD AUTO: 0 % (ref 0–1)
BILIRUB SERPL-MCNC: 0.47 MG/DL (ref 0.2–1)
BUN SERPL-MCNC: 33 MG/DL (ref 5–25)
CALCIUM SERPL-MCNC: 8.9 MG/DL (ref 8.3–10.1)
CHLORIDE SERPL-SCNC: 107 MMOL/L (ref 100–108)
CHOLEST SERPL-MCNC: 310 MG/DL (ref 50–200)
CO2 SERPL-SCNC: 29 MMOL/L (ref 21–32)
CREAT SERPL-MCNC: 0.88 MG/DL (ref 0.6–1.3)
EOSINOPHIL # BLD AUTO: 0.07 THOUSAND/ΜL (ref 0–0.61)
EOSINOPHIL NFR BLD AUTO: 1 % (ref 0–6)
ERYTHROCYTE [DISTWIDTH] IN BLOOD BY AUTOMATED COUNT: 14 % (ref 11.6–15.1)
FERRITIN SERPL-MCNC: 16 NG/ML (ref 8–388)
GFR SERPL CREATININE-BSD FRML MDRD: 71 ML/MIN/1.73SQ M
GLUCOSE P FAST SERPL-MCNC: 70 MG/DL (ref 65–99)
HCT VFR BLD AUTO: 36 % (ref 34.8–46.1)
HDLC SERPL-MCNC: 83 MG/DL
HGB BLD-MCNC: 10.8 G/DL (ref 11.5–15.4)
IMM GRANULOCYTES # BLD AUTO: 0.11 THOUSAND/UL (ref 0–0.2)
IMM GRANULOCYTES NFR BLD AUTO: 1 % (ref 0–2)
LDLC SERPL CALC-MCNC: 200 MG/DL (ref 0–100)
LYMPHOCYTES # BLD AUTO: 2.89 THOUSANDS/ΜL (ref 0.6–4.47)
LYMPHOCYTES NFR BLD AUTO: 27 % (ref 14–44)
MCH RBC QN AUTO: 29.1 PG (ref 26.8–34.3)
MCHC RBC AUTO-ENTMCNC: 30 G/DL (ref 31.4–37.4)
MCV RBC AUTO: 97 FL (ref 82–98)
MONOCYTES # BLD AUTO: 0.82 THOUSAND/ΜL (ref 0.17–1.22)
MONOCYTES NFR BLD AUTO: 8 % (ref 4–12)
NEUTROPHILS # BLD AUTO: 6.8 THOUSANDS/ΜL (ref 1.85–7.62)
NEUTS SEG NFR BLD AUTO: 63 % (ref 43–75)
NONHDLC SERPL-MCNC: 227 MG/DL
NRBC BLD AUTO-RTO: 0 /100 WBCS
PLATELET # BLD AUTO: 291 THOUSANDS/UL (ref 149–390)
PMV BLD AUTO: 9.6 FL (ref 8.9–12.7)
POTASSIUM SERPL-SCNC: 4.2 MMOL/L (ref 3.5–5.3)
PROT SERPL-MCNC: 7.2 G/DL (ref 6.4–8.2)
RBC # BLD AUTO: 3.71 MILLION/UL (ref 3.81–5.12)
SODIUM SERPL-SCNC: 142 MMOL/L (ref 136–145)
TRIGL SERPL-MCNC: 133 MG/DL
TSH SERPL DL<=0.05 MIU/L-ACNC: 1.1 UIU/ML (ref 0.36–3.74)
WBC # BLD AUTO: 10.73 THOUSAND/UL (ref 4.31–10.16)

## 2020-08-20 PROCEDURE — 80061 LIPID PANEL: CPT

## 2020-08-20 PROCEDURE — 80053 COMPREHEN METABOLIC PANEL: CPT

## 2020-08-20 PROCEDURE — 85025 COMPLETE CBC W/AUTO DIFF WBC: CPT

## 2020-08-20 PROCEDURE — 36415 COLL VENOUS BLD VENIPUNCTURE: CPT

## 2020-08-20 PROCEDURE — 86376 MICROSOMAL ANTIBODY EACH: CPT

## 2020-08-20 PROCEDURE — 86800 THYROGLOBULIN ANTIBODY: CPT

## 2020-08-20 PROCEDURE — 3008F BODY MASS INDEX DOCD: CPT | Performed by: UROLOGY

## 2020-08-20 PROCEDURE — 84443 ASSAY THYROID STIM HORMONE: CPT

## 2020-08-20 PROCEDURE — 82728 ASSAY OF FERRITIN: CPT

## 2020-08-20 PROCEDURE — 1036F TOBACCO NON-USER: CPT | Performed by: UROLOGY

## 2020-08-20 PROCEDURE — 99244 OFF/OP CNSLTJ NEW/EST MOD 40: CPT | Performed by: UROLOGY

## 2020-08-20 RX ORDER — LORAZEPAM 0.5 MG/1
0.5 TABLET ORAL 2 TIMES DAILY
Qty: 60 TABLET | Refills: 0 | Status: SHIPPED | OUTPATIENT
Start: 2020-08-20 | End: 2020-09-21 | Stop reason: SDUPTHER

## 2020-08-20 RX ORDER — OMEPRAZOLE 20 MG/1
20 CAPSULE, DELAYED RELEASE ORAL DAILY
COMMUNITY
End: 2020-10-01 | Stop reason: SDUPTHER

## 2020-08-20 NOTE — PROGRESS NOTES
8/20/2020    Stefany Velasquez  1960  [id number]        Assessment  History of interstitial cystitis  Lower urinary tract symptoms    Plan  We discussed all of her symptoms and findings  We discussed behavior modifications as first-line therapy for bladder complaints  I would certainly check urinalysis and culture, however she apparently drinks a lot of tea and iced tea  She should discontinue this immediately  She says she was unaware or forgot that this is not appropriate for her  She should follow bladder diet in general including no tea, caffeine, spicy foods, acidic foods such as tomato sauce  She will try to do this  I do not find the pyridium typically helpful however she is comfortable with this and will continue  Will check urinalysis, ultrasound of the urinary tract, and cystoscopy to further evaluate  If no significant findings, will likely refer back to Dr Cheri Borrego for further evaluation possible treatments  History of Present Illness  Leann Rincon is a 56 y o  female he gives a history of interstitial cystitis, having been treated in the past by Dr Drew Ayers and Dr Cheri Borrego  Primary complaint is pelvic pressure and discomfort  She does not typically complained of dysuria although has had dysuria in the past   She was told of finding of ulcers in her bladder which were cauterized  She has undergone bladder instillations  She denies significant urgency and frequency at this time  She has not had gross hematuria  She does continue to have bladder symptoms however  She has been managed with multiple antibiotic courses with Macrobid  She also takes Pyridium daily  She feels that this helps her  Unfortunately, there are no prior records available to review at this time  She has not seen a gynecologist in several years  She is unable to give urine sample  Review of Systems  Review of Systems   Constitutional: Negative  HENT: Negative  Respiratory: Negative  Cardiovascular: Negative  Gastrointestinal: Negative  Genitourinary:        As per HPI   Musculoskeletal: Negative  Skin: Negative  Neurological: Negative  Hematological: Negative            Past Medical History  Past Medical History:   Diagnosis Date    [facility] 2020    Arthritis     Asthma     Colon polyp     GERD (gastroesophageal reflux disease)     Heart murmur     Hives     Hyperlipidemia     Hypertension     Infertility, female     Migraine     Palpitations     Psychiatric disorder     Wears glasses        Past Social History  Past Surgical History:   Procedure Laterality Date    COLONOSCOPY      EGD      EXPLORATORY LAPAROTOMY      for infertility    HAND SURGERY      Hand excision of tendon cyst    WISDOM TOOTH EXTRACTION         Past Family History  Family History   Problem Relation Age of Onset    Lung cancer Mother     Brain cancer Mother     Diabetes Father     Depression Father     Other Father         septic     Diabetes Other     Allergies Sister     Hashimoto's thyroiditis Sister     Hodgkin's lymphoma Brother     Abdominal aortic aneurysm Sister     Diabetes Sister     No Known Problems Sister     No Known Problems Brother        Past Social history  Social History     Socioeconomic History    Marital status:      Spouse name: Not on file    Number of children: 0    Years of education: Not on file    Highest education level: Not on file   Occupational History    Occupation: Urgent Care      Comment: full-time    Social Needs    Financial resource strain: Not on file    Food insecurity     Worry: Not on file     Inability: Not on file   [employer] needs     Medical: Not on file     Non-medical: Not on file   Tobacco Use    Smoking status: Former Smoker     Packs/day: 0 50     Years: 10 00     Pack years: 5 00     Last attempt to quit: 2019     Years since quittin 6    Smokeless tobacco: Never Used   Substance and Sexual Activity    Alcohol use: No    Drug use: No    Sexual activity: Not Currently   Lifestyle    Physical activity     Days per week: 0 days     Minutes per session: 0 min    Stress: Not on file   Relationships    Social connections     Talks on phone: Not on file     Gets together: Not on file     Attends Caodaism service: Not on file     Active member of club or organization: Not on file     Attends meetings of clubs or organizations: Not on file     Relationship status: Not on file    Intimate partner violence     Fear of current or ex partner: Not on file     Emotionally abused: Not on file     Physically abused: Not on file     Forced sexual activity: Not on file   Other Topics Concern    Not on file   Social History Narrative    Who lives in your home: Alone     What type of home do you live in: Apartment     Age of your home: 1952 built     How long have you been living there: 5 yrs     Type of heat: forced hot air     Type of fuel: electric     What type of jamin is in your bedroom: carpet jamin     Do you have the following in or near your home:    Air products: Humidifier in the bedroom/kitchen     Pests: none     Pets: none     Are pets allowed in bedroom: N/A     Open fields, wooded areas nearby: No     Basement: [id number]-[id number] mold     Exposure to second hand smoke: No    Central air     Habits:    Caffeine: Hot tea 1 cup daily- ice tea 1 cup in the afternoon    Chocolate: Occasionally     Other:     Social History     Tobacco Use   Smoking Status Former Smoker    Packs/day: 0 50    Years: 10 00    Pack years: 5 00    Last attempt to quit: 2019    Years since quittin 6   Smokeless Tobacco Never Used       Current Medications  Current Outpatient Medications   Medication Sig Dispense Refill    acetaminophen (TYLENOL) 325 mg tablet Take 650 mg by mouth every 6 (six) hours as needed for mild pain      apixaban (Eliquis) 5 mg Take 1 tablet (5 mg total) by mouth 2 (two) times a day Sample: lot [id number], expir 4/2022 56 tablet 0    azelastine (ASTELIN) 0 1 % nasal spray 1 spray into each nostril 2 (two) times a day Use in each nostril as directed 1 Bottle 12    buPROPion (WELLBUTRIN XL) 150 mg 24 hr tablet Take 1 tablet (150 mg total) by mouth 2 (two) times a day 180 tablet 1    CARAFATE 1 GM/10ML suspension Take 10 mL by mouth 2 (two) times a day      cetirizine (ZyrTEC) 10 mg tablet Take 1 tablet (10 mg total) by mouth daily for 365 doses 30 tablet 11    Cholecalciferol (VITAMIN D-3) 125 MCG (5000 UT) TABS Take 1 tablet by mouth daily OVER THE COUNTER 30 tablet 11    DEXILANT 60 MG capsule Take 1 capsule (60 mg total) by mouth daily 90 capsule 1    diclofenac sodium (VOLTAREN) 1 % Apply 2 g topically 4 (four) times a day 5 Tube 1    econazole nitrate 1 % cream apply topically daily 30 g 0    Epinastine HCl 0 05 % ophthalmic solution Administer 1 drop to both eyes 2 (two) times a day 5 mL 12    fexofenadine (ALLEGRA) 180 MG tablet Take 1 tablet (180 mg total) by mouth daily IN AM 30 tablet 12    FLUoxetine (PROzac) 10 mg capsule Take 1 capsule (10 mg total) by mouth daily 90 capsule 1    fluticasone (FLONASE) 50 mcg/act nasal spray 1 spray into each nostril 2 (two) times a day 3 Bottle 1    fluticasone-salmeterol (ADVAIR DISKUS) 250-50 mcg/dose inhaler Inhale 1 puff 2 (two) times a day Rinse mouth after use   1 Inhaler 5    hydrochlorothiazide (HYDRODIURIL) 25 mg tablet Take 1 tablet (25 mg total) by mouth daily 30 tablet 3    hydrocortisone-pramoxine (PROCTOFOAM-HC) rectal foam Insert 1 applicator into the rectum 2 (two) times a day 30 g 1    LORazepam (ATIVAN) 0 5 mg tablet Take 1 tablet (0 5 mg total) by mouth 2 (two) times a day 60 tablet 0    metoprolol succinate (TOPROL-XL) 100 mg 24 hr tablet Take 1 tablet (100 mg total) by mouth 2 (two) times a day 180 tablet 1    MULTIPLE VITAMIN PO Take by mouth      omeprazole (PriLOSEC) 20 mg delayed release capsule Take 20 mg by mouth daily      phenazopyridine (Pyridium) 200 mg tablet Take 1 tablet (200 mg total) by mouth 3 (three) times a day with meals 30 tablet 1    predniSONE 10 mg tablet 2 tablets daily for 3 days then 1 tablet daily 10 tablet 0    predniSONE 10 mg tablet Patient is to take two tablets daily for one week, then one tablet daily for one week, then stop  21 tablet 0    famotidine (PEPCID) 20 mg tablet Take 1 tablet (20 mg total) by mouth 2 (two) times a day (Patient not taking: Reported on 8/20/2020) 60 tablet 11     No current facility-administered medications for this visit  Allergies  Allergies   Allergen Reactions    Ace Inhibitors Angioedema    Beta Adrenergic Blockers Anaphylaxis     Anaphylaxis  Anaphylaxis    Amlodipine Edema    Atorvastatin Other (See Comments)     "throat closing up"    Barium Sulfate Other (See Comments)     hives throat closing    Benicar [Olmesartan] Angioedema     See Allergy note from 9/11/2008   Other Other (See Comments)     Other reaction(s): angioadema  Other reaction(s): Other (See Comments)  Preservatives- itching throat closes, hi  Other reaction(s): angioadema  E Z Cat - hives throat closes itching,  Preservatives- itching throat closes, hi    Sulfa Antibiotics Other (See Comments)     stuffiness,itching,hives,throat closing    Valsartan Angioedema       Past Medical History, Social History, Family History, medications and allergies were reviewed  Vitals  Vitals:    08/20/20 0758   BP: 142/86   BP Location: Left arm   Patient Position: Sitting   Cuff Size: Adult   Pulse: 64   Temp: (!) 96 8 °F (36 °C)   Weight: 103 kg (227 lb 6 4 oz)   Height: 5' 4" (1 626 m)       Physical Exam  Physical Exam  Vitals signs reviewed  Constitutional:       Appearance: She is well-developed  HENT:      Head: Normocephalic and atraumatic  Eyes:      Conjunctiva/sclera: Conjunctivae normal    Cardiovascular:      Rate and Rhythm: Normal rate     Pulmonary: Effort: Pulmonary effort is normal    Abdominal:      Palpations: Abdomen is soft  Genitourinary:     Comments: No CVA tenderness  Musculoskeletal: Normal range of motion  Skin:     General: Skin is warm and dry  Neurological:      Mental Status: She is alert and oriented to person, place, and time     Psychiatric:         Mood and Affect: Mood normal            Results  No results found for: PSA  Lab Results   Component Value Date    CALCIUM 9 9 05/14/2019     11/13/2017    K 4 5 05/14/2019    CO2 28 05/14/2019     05/14/2019    BUN 28 (H) 05/14/2019    CREATININE 1 22 05/14/2019     Lab Results   Component Value Date    WBC 5 19 05/14/2019    HGB 12 2 05/14/2019    HCT 36 6 05/14/2019    MCV 95 05/14/2019     05/14/2019

## 2020-08-21 ENCOUNTER — TELEPHONE (OUTPATIENT)
Dept: FAMILY MEDICINE CLINIC | Facility: CLINIC | Age: 60
End: 2020-08-21

## 2020-08-21 DIAGNOSIS — I10 ESSENTIAL HYPERTENSION: ICD-10-CM

## 2020-08-21 DIAGNOSIS — E78.5 HYPERLIPIDEMIA, UNSPECIFIED HYPERLIPIDEMIA TYPE: Primary | ICD-10-CM

## 2020-08-21 DIAGNOSIS — E78.2 MIXED HYPERLIPIDEMIA: ICD-10-CM

## 2020-08-21 LAB
THYROGLOB AB SERPL-ACNC: <1 IU/ML (ref 0–0.9)
THYROPEROXIDASE AB SERPL-ACNC: <9 IU/ML (ref 0–34)

## 2020-08-21 RX ORDER — ROSUVASTATIN CALCIUM 5 MG/1
5 TABLET, COATED ORAL DAILY
Qty: 90 TABLET | Refills: 1 | Status: CANCELLED | OUTPATIENT
Start: 2020-08-21

## 2020-08-21 NOTE — TELEPHONE ENCOUNTER
Call patient  Did not realize patient with significant allergic reaction to atorvastatin    Will hold off starting cholesterol medication at this time will discuss further at further visit in office

## 2020-08-21 NOTE — TELEPHONE ENCOUNTER
----- Message from Ani Jaimes, DO sent at 8/21/2020  7:33 AM EDT -----  Call patient  Some labs reviewed so far  Patient with elevated cholesterol 310 with an   See if patient willing to start Crestor 5 mg daily number 90 with 1 refill    If so, recheck lipid profile, CMP CBC in 2 months

## 2020-08-21 NOTE — TELEPHONE ENCOUNTER
Spoke with patient about labs and recommendation for Crestor  She understands and will start this medication  Have placed order for med and repeat labs  Patient states she is taking her last dose of prednisone today for knee tingling/pins and needles, and she states this has not helped her  Please advise  Thank you

## 2020-08-31 DIAGNOSIS — F32.A ANXIETY AND DEPRESSION: ICD-10-CM

## 2020-08-31 DIAGNOSIS — F41.9 ANXIETY AND DEPRESSION: ICD-10-CM

## 2020-08-31 RX ORDER — BUPROPION HYDROCHLORIDE 150 MG/1
150 TABLET ORAL 2 TIMES DAILY
Qty: 180 TABLET | Refills: 1 | Status: SHIPPED | OUTPATIENT
Start: 2020-08-31 | End: 2020-12-18

## 2020-09-01 ENCOUNTER — TELEPHONE (OUTPATIENT)
Dept: FAMILY MEDICINE CLINIC | Facility: CLINIC | Age: 60
End: 2020-09-01

## 2020-09-01 ENCOUNTER — HOSPITAL ENCOUNTER (OUTPATIENT)
Dept: ULTRASOUND IMAGING | Facility: MEDICAL CENTER | Age: 60
Discharge: HOME | End: 2020-09-01
Payer: COMMERCIAL

## 2020-09-01 ENCOUNTER — OFFICE VISIT (OUTPATIENT)
Dept: FAMILY MEDICINE CLINIC | Facility: CLINIC | Age: 60
End: 2020-09-01
Payer: COMMERCIAL

## 2020-09-01 VITALS
SYSTOLIC BLOOD PRESSURE: 118 MMHG | TEMPERATURE: 96.5 F | HEIGHT: 64 IN | DIASTOLIC BLOOD PRESSURE: 76 MMHG | BODY MASS INDEX: 38.76 KG/M2 | WEIGHT: 227 LBS

## 2020-09-01 DIAGNOSIS — N30.10 INTERSTITIAL CYSTITIS: ICD-10-CM

## 2020-09-01 DIAGNOSIS — S99.921A INJURY OF TOE ON RIGHT FOOT, INITIAL ENCOUNTER: Primary | ICD-10-CM

## 2020-09-01 PROCEDURE — 99213 OFFICE O/P EST LOW 20 MIN: CPT | Performed by: FAMILY MEDICINE

## 2020-09-01 PROCEDURE — 51798 US URINE CAPACITY MEASURE: CPT

## 2020-09-01 NOTE — PROGRESS NOTES
Assessment/Plan: patient go for x-ray of the right foot to rule out fracture of the 3rd and 4th toes  Patient use Motrin and diclofenac gel as directed  Patient use ice for any swelling  Guidance given overall  Patient will not go barefoot  Diagnoses and all orders for this visit:    Injury of toe on right foot, initial encounter  -     XR foot 3+ vw left; Future            Subjective:        Patient ID: Tiffanie Levy is a 56 y o  female  Patient is here concerned about possible fracture of to the 3rd and 4th digits of the right foot status post trauma last Thursday  Patient is using ice  Patient using diclofenac gel with some improvement  The following portions of the patient's history were reviewed and updated as appropriate: allergies, current medications, past family history, past medical history, past social history, past surgical history and problem list       Review of Systems   Constitutional: Negative  HENT: Negative  Eyes: Negative  Respiratory: Negative  Cardiovascular: Negative  Gastrointestinal: Negative  Endocrine: Negative  Genitourinary: Negative  Musculoskeletal: Positive for arthralgias  Skin: Negative  Allergic/Immunologic: Negative  Neurological: Negative  Hematological: Negative  Psychiatric/Behavioral: Negative  Objective:               /76 (BP Location: Right arm, Patient Position: Sitting, Cuff Size: Standard)   Temp (!) 96 5 °F (35 8 °C) (Tympanic)   Ht 5' 4" (1 626 m)   Wt 103 kg (227 lb)   LMP  (LMP Unknown)   BMI 38 96 kg/m²          Physical Exam  Vitals signs and nursing note reviewed  Constitutional:       Appearance: Normal appearance  Musculoskeletal:      Comments: Pain with palpation over the proximal phalanx of the 3rd and 4th toes on the right  No redness  Patient has pain with palpation  Neurovascular intact   Skin:     Capillary Refill: Capillary refill takes 2 to 3 seconds  Neurological:      Mental Status: She is alert     Psychiatric:         Mood and Affect: Mood normal          Behavior: Behavior normal

## 2020-09-09 ENCOUNTER — APPOINTMENT (OUTPATIENT)
Dept: RADIOLOGY | Facility: MEDICAL CENTER | Age: 60
End: 2020-09-09
Payer: COMMERCIAL

## 2020-09-09 DIAGNOSIS — S99.921A INJURY OF TOE ON RIGHT FOOT, INITIAL ENCOUNTER: ICD-10-CM

## 2020-09-09 PROCEDURE — 73630 X-RAY EXAM OF FOOT: CPT

## 2020-09-17 DIAGNOSIS — N30.10 INTERSTITIAL CYSTITIS: ICD-10-CM

## 2020-09-17 NOTE — TELEPHONE ENCOUNTER
Patient is calling for refill of pyridium and Ativan  She was seen on 9/1/20  Have placed order for approval and  on desk  Thank you

## 2020-09-18 RX ORDER — PHENAZOPYRIDINE HYDROCHLORIDE 200 MG/1
200 TABLET, FILM COATED ORAL
Qty: 30 TABLET | Refills: 1 | Status: SHIPPED | OUTPATIENT
Start: 2020-09-18 | End: 2020-11-12 | Stop reason: SDUPTHER

## 2020-09-21 ENCOUNTER — OFFICE VISIT (OUTPATIENT)
Dept: FAMILY MEDICINE CLINIC | Facility: CLINIC | Age: 60
End: 2020-09-21
Payer: COMMERCIAL

## 2020-09-21 VITALS — TEMPERATURE: 97.8 F

## 2020-09-21 DIAGNOSIS — J06.9 ACUTE UPPER RESPIRATORY INFECTION: Primary | ICD-10-CM

## 2020-09-21 DIAGNOSIS — R06.2 WHEEZING: ICD-10-CM

## 2020-09-21 DIAGNOSIS — F32.A ANXIETY AND DEPRESSION: ICD-10-CM

## 2020-09-21 DIAGNOSIS — F41.9 ANXIETY AND DEPRESSION: ICD-10-CM

## 2020-09-21 DIAGNOSIS — J06.9 ACUTE UPPER RESPIRATORY INFECTION: ICD-10-CM

## 2020-09-21 PROCEDURE — 99213 OFFICE O/P EST LOW 20 MIN: CPT | Performed by: FAMILY MEDICINE

## 2020-09-21 RX ORDER — LORAZEPAM 0.5 MG/1
0.5 TABLET ORAL 2 TIMES DAILY
Qty: 60 TABLET | Refills: 0 | Status: CANCELLED | OUTPATIENT
Start: 2020-09-21

## 2020-09-21 RX ORDER — FLUTICASONE FUROATE AND VILANTEROL 200; 25 UG/1; UG/1
1 POWDER RESPIRATORY (INHALATION) DAILY
Qty: 1 INHALER | Refills: 3 | Status: SHIPPED | OUTPATIENT
Start: 2020-09-21 | End: 2021-03-01 | Stop reason: SDUPTHER

## 2020-09-21 RX ORDER — AZITHROMYCIN 250 MG/1
TABLET, FILM COATED ORAL
Qty: 6 TABLET | Refills: 0 | Status: SHIPPED | OUTPATIENT
Start: 2020-09-21 | End: 2020-09-25

## 2020-09-21 RX ORDER — AZITHROMYCIN 250 MG/1
TABLET, FILM COATED ORAL
Qty: 6 TABLET | Refills: 0 | Status: SHIPPED | OUTPATIENT
Start: 2020-09-21 | End: 2020-09-21 | Stop reason: SDUPTHER

## 2020-09-21 RX ORDER — LORAZEPAM 0.5 MG/1
0.5 TABLET ORAL 2 TIMES DAILY
Qty: 60 TABLET | Refills: 0 | Status: SHIPPED | OUTPATIENT
Start: 2020-09-21 | End: 2020-09-21 | Stop reason: SDUPTHER

## 2020-09-21 RX ORDER — LORAZEPAM 0.5 MG/1
0.5 TABLET ORAL 2 TIMES DAILY
Qty: 60 TABLET | Refills: 0 | Status: SHIPPED | OUTPATIENT
Start: 2020-09-21 | End: 2020-10-19 | Stop reason: SDUPTHER

## 2020-09-21 RX ORDER — FLUTICASONE FUROATE AND VILANTEROL 200; 25 UG/1; UG/1
1 POWDER RESPIRATORY (INHALATION) DAILY
Qty: 1 INHALER | Refills: 3 | Status: SHIPPED | OUTPATIENT
Start: 2020-09-21 | End: 2020-09-21 | Stop reason: SDUPTHER

## 2020-09-21 NOTE — PROGRESS NOTES
Assessment/Plan:       Diagnoses and all orders for this visit:    Acute upper respiratory infection  -     azithromycin (ZITHROMAX) 250 mg tablet; Take 2 tablets today then 1 tablet daily x 4 days  -     fluticasone-vilanterol (BREO ELLIPTA) 200-25 MCG/INH inhaler; Inhale 1 puff daily Rinse mouth after use  Anxiety and depression  -     LORazepam (ATIVAN) 0 5 mg tablet; Take 1 tablet (0 5 mg total) by mouth 2 (two) times a day    Wheezing  -     fluticasone-vilanterol (BREO ELLIPTA) 200-25 MCG/INH inhaler; Inhale 1 puff daily Rinse mouth after use  Subjective:        Patient ID: Iris Murphy is a 56 y o  female  Patient is here with wheezing with past 2 weeks  Patient with headaches as well as some eye discomfort over the past 8 days  Patient also with cough and congestion and mild sore throat  Patient has had some nausea and diarrhea  No fevers  Patient using Allegra  Patient resting and taking vitamins        The following portions of the patient's history were reviewed and updated as appropriate: allergies, current medications, past family history, past medical history, past social history, past surgical history and problem list       Review of Systems   Constitutional: Negative  HENT: Positive for congestion  Eyes: Positive for pain  Respiratory: Positive for cough  Cardiovascular: Negative  Gastrointestinal: Positive for diarrhea  Endocrine: Negative  Genitourinary: Negative  Musculoskeletal: Negative  Skin: Negative  Allergic/Immunologic: Negative  Neurological: Positive for headaches  Hematological: Negative  Psychiatric/Behavioral: Negative  Objective:               Temp 97 8 °F (36 6 °C)   LMP  (LMP Unknown)          Physical Exam  Vitals signs and nursing note reviewed  Constitutional:       General: She is not in acute distress  Appearance: Normal appearance  She is well-developed  She is obese   She is not ill-appearing, toxic-appearing or diaphoretic  HENT:      Head: Normocephalic and atraumatic  Right Ear: Tympanic membrane, ear canal and external ear normal       Left Ear: Tympanic membrane, ear canal and external ear normal       Mouth/Throat:      Pharynx: Oropharyngeal exudate present  Eyes:      General: No scleral icterus  Right eye: No discharge  Left eye: No discharge  Pupils: Pupils are equal, round, and reactive to light  Neck:      Musculoskeletal: Normal range of motion and neck supple  Thyroid: No thyromegaly  Cardiovascular:      Rate and Rhythm: Normal rate and regular rhythm  Pulses: Normal pulses  Heart sounds: Normal heart sounds  No murmur  No friction rub  No gallop  Pulmonary:      Effort: Pulmonary effort is normal  No respiratory distress  Breath sounds: Normal breath sounds  No wheezing or rales  Chest:      Chest wall: No tenderness  Musculoskeletal: Normal range of motion  General: No tenderness or deformity  Lymphadenopathy:      Cervical: No cervical adenopathy  Skin:     General: Skin is warm and dry  Coloration: Skin is not pale  Findings: No erythema or rash  Neurological:      Mental Status: She is alert and oriented to person, place, and time  Cranial Nerves: No cranial nerve deficit  Motor: No abnormal muscle tone  Coordination: Coordination normal    Psychiatric:         Behavior: Behavior normal          Thought Content:  Thought content normal          Judgment: Judgment normal

## 2020-09-29 DIAGNOSIS — B37.3 VAGINAL CANDIDIASIS: ICD-10-CM

## 2020-10-01 ENCOUNTER — OFFICE VISIT (OUTPATIENT)
Dept: FAMILY MEDICINE CLINIC | Facility: CLINIC | Age: 60
End: 2020-10-01
Payer: COMMERCIAL

## 2020-10-01 VITALS
SYSTOLIC BLOOD PRESSURE: 150 MMHG | HEIGHT: 64 IN | BODY MASS INDEX: 40.29 KG/M2 | DIASTOLIC BLOOD PRESSURE: 90 MMHG | WEIGHT: 236 LBS

## 2020-10-01 DIAGNOSIS — I10 BENIGN ESSENTIAL HYPERTENSION: ICD-10-CM

## 2020-10-01 DIAGNOSIS — M19.90 ARTHRITIS: ICD-10-CM

## 2020-10-01 DIAGNOSIS — I10 ESSENTIAL HYPERTENSION: ICD-10-CM

## 2020-10-01 DIAGNOSIS — K21.9 GASTROESOPHAGEAL REFLUX DISEASE WITHOUT ESOPHAGITIS: ICD-10-CM

## 2020-10-01 DIAGNOSIS — R53.82 CHRONIC FATIGUE: ICD-10-CM

## 2020-10-01 DIAGNOSIS — J30.1 SEASONAL ALLERGIC RHINITIS DUE TO POLLEN: Primary | ICD-10-CM

## 2020-10-01 DIAGNOSIS — N30.10 INTERSTITIAL CYSTITIS: ICD-10-CM

## 2020-10-01 DIAGNOSIS — I87.2 VENOUS INSUFFICIENCY: ICD-10-CM

## 2020-10-01 DIAGNOSIS — M54.16 LUMBAR RADICULOPATHY: ICD-10-CM

## 2020-10-01 DIAGNOSIS — F41.9 ANXIETY: ICD-10-CM

## 2020-10-01 PROCEDURE — 99214 OFFICE O/P EST MOD 30 MIN: CPT | Performed by: FAMILY MEDICINE

## 2020-10-01 RX ORDER — HYDROCHLOROTHIAZIDE 25 MG/1
25 TABLET ORAL DAILY
Qty: 30 TABLET | Refills: 3 | Status: SHIPPED | OUTPATIENT
Start: 2020-10-01 | End: 2020-11-20

## 2020-10-01 RX ORDER — AMITRIPTYLINE HYDROCHLORIDE 25 MG/1
25 TABLET, FILM COATED ORAL
COMMUNITY
End: 2020-12-01 | Stop reason: SINTOL

## 2020-10-01 RX ORDER — OMEPRAZOLE 20 MG/1
20 CAPSULE, DELAYED RELEASE ORAL DAILY
Qty: 90 CAPSULE | Refills: 1 | Status: SHIPPED | OUTPATIENT
Start: 2020-10-01 | End: 2021-04-14

## 2020-10-01 RX ORDER — MONTELUKAST SODIUM 10 MG/1
10 TABLET ORAL
Qty: 90 TABLET | Refills: 3 | Status: SHIPPED | OUTPATIENT
Start: 2020-10-01 | End: 2021-09-22

## 2020-10-08 ENCOUNTER — OFFICE VISIT (OUTPATIENT)
Dept: FAMILY MEDICINE CLINIC | Facility: CLINIC | Age: 60
End: 2020-10-08
Payer: COMMERCIAL

## 2020-10-08 VITALS
DIASTOLIC BLOOD PRESSURE: 70 MMHG | HEIGHT: 64 IN | WEIGHT: 230 LBS | TEMPERATURE: 97.1 F | SYSTOLIC BLOOD PRESSURE: 130 MMHG | BODY MASS INDEX: 39.27 KG/M2

## 2020-10-08 DIAGNOSIS — R06.02 SOB (SHORTNESS OF BREATH): ICD-10-CM

## 2020-10-08 DIAGNOSIS — N63.20 BREAST MASS, LEFT: Primary | ICD-10-CM

## 2020-10-08 DIAGNOSIS — R53.83 FATIGUE, UNSPECIFIED TYPE: ICD-10-CM

## 2020-10-08 PROCEDURE — 99214 OFFICE O/P EST MOD 30 MIN: CPT | Performed by: FAMILY MEDICINE

## 2020-10-08 PROCEDURE — 3078F DIAST BP <80 MM HG: CPT | Performed by: FAMILY MEDICINE

## 2020-10-08 PROCEDURE — 1036F TOBACCO NON-USER: CPT | Performed by: FAMILY MEDICINE

## 2020-10-12 ENCOUNTER — APPOINTMENT (OUTPATIENT)
Dept: RADIOLOGY | Facility: MEDICAL CENTER | Age: 60
End: 2020-10-12
Payer: COMMERCIAL

## 2020-10-12 ENCOUNTER — HOSPITAL ENCOUNTER (OUTPATIENT)
Dept: MAMMOGRAPHY | Facility: CLINIC | Age: 60
Discharge: HOME | End: 2020-10-12
Payer: COMMERCIAL

## 2020-10-12 ENCOUNTER — HOSPITAL ENCOUNTER (OUTPATIENT)
Dept: ULTRASOUND IMAGING | Facility: CLINIC | Age: 60
Discharge: HOME | End: 2020-10-12
Payer: COMMERCIAL

## 2020-10-12 VITALS — BODY MASS INDEX: 39.27 KG/M2 | WEIGHT: 230 LBS | TEMPERATURE: 96.6 F | HEIGHT: 64 IN

## 2020-10-12 DIAGNOSIS — N63.20 BREAST MASS, LEFT: ICD-10-CM

## 2020-10-12 DIAGNOSIS — I10 ESSENTIAL HYPERTENSION: ICD-10-CM

## 2020-10-12 DIAGNOSIS — R06.02 SOB (SHORTNESS OF BREATH): ICD-10-CM

## 2020-10-12 PROCEDURE — 71046 X-RAY EXAM CHEST 2 VIEWS: CPT

## 2020-10-12 PROCEDURE — 77066 DX MAMMO INCL CAD BI: CPT

## 2020-10-12 PROCEDURE — 76642 ULTRASOUND BREAST LIMITED: CPT

## 2020-10-12 PROCEDURE — G0279 TOMOSYNTHESIS, MAMMO: HCPCS

## 2020-10-19 ENCOUNTER — LAB (OUTPATIENT)
Dept: LAB | Facility: MEDICAL CENTER | Age: 60
End: 2020-10-19
Payer: COMMERCIAL

## 2020-10-19 DIAGNOSIS — F41.9 ANXIETY AND DEPRESSION: ICD-10-CM

## 2020-10-19 DIAGNOSIS — F32.A ANXIETY AND DEPRESSION: ICD-10-CM

## 2020-10-19 LAB
BASOPHILS # BLD AUTO: 0.06 THOUSANDS/ΜL (ref 0–0.1)
BASOPHILS NFR BLD AUTO: 1 % (ref 0–1)
EOSINOPHIL # BLD AUTO: 0.17 THOUSAND/ΜL (ref 0–0.61)
EOSINOPHIL NFR BLD AUTO: 2 % (ref 0–6)
ERYTHROCYTE [DISTWIDTH] IN BLOOD BY AUTOMATED COUNT: 12.8 % (ref 11.6–15.1)
HCT VFR BLD AUTO: 36.4 % (ref 34.8–46.1)
HGB BLD-MCNC: 11.2 G/DL (ref 11.5–15.4)
IMM GRANULOCYTES # BLD AUTO: 0.06 THOUSAND/UL (ref 0–0.2)
IMM GRANULOCYTES NFR BLD AUTO: 1 % (ref 0–2)
LYMPHOCYTES # BLD AUTO: 1.57 THOUSANDS/ΜL (ref 0.6–4.47)
LYMPHOCYTES NFR BLD AUTO: 21 % (ref 14–44)
MCH RBC QN AUTO: 28.1 PG (ref 26.8–34.3)
MCHC RBC AUTO-ENTMCNC: 30.8 G/DL (ref 31.4–37.4)
MCV RBC AUTO: 92 FL (ref 82–98)
MONOCYTES # BLD AUTO: 0.73 THOUSAND/ΜL (ref 0.17–1.22)
MONOCYTES NFR BLD AUTO: 10 % (ref 4–12)
NEUTROPHILS # BLD AUTO: 4.79 THOUSANDS/ΜL (ref 1.85–7.62)
NEUTS SEG NFR BLD AUTO: 65 % (ref 43–75)
NRBC BLD AUTO-RTO: 0 /100 WBCS
PLATELET # BLD AUTO: 284 THOUSANDS/UL (ref 149–390)
PMV BLD AUTO: 9.6 FL (ref 8.9–12.7)
RBC # BLD AUTO: 3.98 MILLION/UL (ref 3.81–5.12)
WBC # BLD AUTO: 7.38 THOUSAND/UL (ref 4.31–10.16)

## 2020-10-19 PROCEDURE — 36415 COLL VENOUS BLD VENIPUNCTURE: CPT | Performed by: FAMILY MEDICINE

## 2020-10-19 PROCEDURE — 85025 COMPLETE CBC W/AUTO DIFF WBC: CPT | Performed by: FAMILY MEDICINE

## 2020-10-19 RX ORDER — LORAZEPAM 0.5 MG/1
0.5 TABLET ORAL 2 TIMES DAILY
Qty: 60 TABLET | Refills: 0 | Status: SHIPPED | OUTPATIENT
Start: 2020-10-19 | End: 2020-11-18 | Stop reason: SDUPTHER

## 2020-10-22 ENCOUNTER — OFFICE VISIT (OUTPATIENT)
Dept: OBGYN CLINIC | Facility: MEDICAL CENTER | Age: 60
End: 2020-10-22
Payer: COMMERCIAL

## 2020-10-22 VITALS
SYSTOLIC BLOOD PRESSURE: 120 MMHG | HEIGHT: 64 IN | BODY MASS INDEX: 39.95 KG/M2 | DIASTOLIC BLOOD PRESSURE: 80 MMHG | WEIGHT: 234 LBS

## 2020-10-22 DIAGNOSIS — Z12.31 ENCOUNTER FOR SCREENING MAMMOGRAM FOR MALIGNANT NEOPLASM OF BREAST: ICD-10-CM

## 2020-10-22 DIAGNOSIS — Z01.419 WELL WOMAN EXAM WITH ROUTINE GYNECOLOGICAL EXAM: Primary | ICD-10-CM

## 2020-10-22 DIAGNOSIS — R10.2 PELVIC PRESSURE IN FEMALE: ICD-10-CM

## 2020-10-22 DIAGNOSIS — L90.0 LICHEN SCLEROSUS: ICD-10-CM

## 2020-10-22 PROCEDURE — S0610 ANNUAL GYNECOLOGICAL EXAMINA: HCPCS | Performed by: OBSTETRICS & GYNECOLOGY

## 2020-10-22 RX ORDER — TRIAMCINOLONE ACETONIDE 1 MG/G
CREAM TOPICAL 2 TIMES DAILY
Qty: 30 G | Refills: 1 | Status: SHIPPED | OUTPATIENT
Start: 2020-10-22

## 2020-10-22 RX ORDER — TRIAMCINOLONE ACETONIDE 1 MG/G
CREAM TOPICAL
COMMUNITY
Start: 2020-10-16 | End: 2020-10-22 | Stop reason: SDUPTHER

## 2020-10-27 LAB
CLINICAL INFO: NORMAL
CYTO CVX: NORMAL
CYTOLOGY CMNT CVX/VAG CYTO-IMP: NORMAL
DATE PREVIOUS BX: NORMAL
HPV E6+E7 MRNA CVX QL NAA+PROBE: NOT DETECTED
LMP START DATE: NORMAL
SL AMB PREV. PAP:: NORMAL
SPECIMEN SOURCE CVX/VAG CYTO: NORMAL

## 2020-11-04 ENCOUNTER — OFFICE VISIT (OUTPATIENT)
Dept: FAMILY MEDICINE CLINIC | Facility: CLINIC | Age: 60
End: 2020-11-04
Payer: COMMERCIAL

## 2020-11-04 VITALS
BODY MASS INDEX: 40.63 KG/M2 | SYSTOLIC BLOOD PRESSURE: 132 MMHG | TEMPERATURE: 96.9 F | HEIGHT: 64 IN | DIASTOLIC BLOOD PRESSURE: 94 MMHG | WEIGHT: 238 LBS

## 2020-11-04 DIAGNOSIS — R30.0 DYSURIA: Primary | ICD-10-CM

## 2020-11-04 PROBLEM — N10 ACUTE PYELONEPHRITIS: Status: ACTIVE | Noted: 2020-11-04

## 2020-11-04 PROCEDURE — 99213 OFFICE O/P EST LOW 20 MIN: CPT | Performed by: FAMILY MEDICINE

## 2020-11-04 PROCEDURE — 87186 SC STD MICRODIL/AGAR DIL: CPT | Performed by: FAMILY MEDICINE

## 2020-11-04 PROCEDURE — 87086 URINE CULTURE/COLONY COUNT: CPT | Performed by: FAMILY MEDICINE

## 2020-11-04 PROCEDURE — 87077 CULTURE AEROBIC IDENTIFY: CPT | Performed by: FAMILY MEDICINE

## 2020-11-04 RX ORDER — CIPROFLOXACIN 500 MG/1
500 TABLET, FILM COATED ORAL
COMMUNITY
Start: 2020-10-23 | End: 2020-11-04

## 2020-11-04 RX ORDER — CHLORHEXIDINE GLUCONATE 0.12 MG/ML
RINSE ORAL
COMMUNITY
Start: 2020-10-28

## 2020-11-04 RX ORDER — CEPHALEXIN 500 MG/1
500 CAPSULE ORAL 3 TIMES DAILY
Qty: 30 CAPSULE | Refills: 0 | Status: SHIPPED | OUTPATIENT
Start: 2020-11-04 | End: 2020-11-14

## 2020-11-04 RX ORDER — TRAMADOL HYDROCHLORIDE 50 MG/1
50 TABLET ORAL EVERY 8 HOURS PRN
Qty: 10 TABLET | Refills: 0 | Status: SHIPPED | OUTPATIENT
Start: 2020-11-04 | End: 2020-12-29 | Stop reason: CLARIF

## 2020-11-05 ENCOUNTER — HOSPITAL ENCOUNTER (OUTPATIENT)
Dept: RADIOLOGY | Age: 60
Discharge: HOME | End: 2020-11-05
Payer: COMMERCIAL

## 2020-11-05 DIAGNOSIS — R10.2 PELVIC PRESSURE IN FEMALE: ICD-10-CM

## 2020-11-05 PROCEDURE — 76856 US EXAM PELVIC COMPLETE: CPT

## 2020-11-05 PROCEDURE — 76830 TRANSVAGINAL US NON-OB: CPT

## 2020-11-06 LAB — BACTERIA UR CULT: ABNORMAL

## 2020-11-11 ENCOUNTER — TELEPHONE (OUTPATIENT)
Dept: OBGYN CLINIC | Facility: MEDICAL CENTER | Age: 60
End: 2020-11-11

## 2020-11-12 DIAGNOSIS — R10.2 PELVIC PRESSURE IN FEMALE: Primary | ICD-10-CM

## 2020-11-12 DIAGNOSIS — N30.10 INTERSTITIAL CYSTITIS: ICD-10-CM

## 2020-11-12 RX ORDER — PHENAZOPYRIDINE HYDROCHLORIDE 200 MG/1
200 TABLET, FILM COATED ORAL
Qty: 30 TABLET | Refills: 1 | Status: SHIPPED | OUTPATIENT
Start: 2020-11-12 | End: 2021-02-11 | Stop reason: SDUPTHER

## 2020-11-16 ENCOUNTER — APPOINTMENT (OUTPATIENT)
Dept: RADIOLOGY | Facility: MEDICAL CENTER | Age: 60
End: 2020-11-16
Payer: COMMERCIAL

## 2020-11-16 DIAGNOSIS — M54.16 LUMBAR RADICULOPATHY: ICD-10-CM

## 2020-11-16 PROCEDURE — 72110 X-RAY EXAM L-2 SPINE 4/>VWS: CPT

## 2020-11-17 ENCOUNTER — TELEPHONE (OUTPATIENT)
Dept: FAMILY MEDICINE CLINIC | Facility: CLINIC | Age: 60
End: 2020-11-17

## 2020-11-17 DIAGNOSIS — M54.16 LUMBAR RADICULOPATHY: Primary | ICD-10-CM

## 2020-11-18 DIAGNOSIS — F32.A ANXIETY AND DEPRESSION: ICD-10-CM

## 2020-11-18 DIAGNOSIS — F41.9 ANXIETY AND DEPRESSION: ICD-10-CM

## 2020-11-18 RX ORDER — LORAZEPAM 0.5 MG/1
0.5 TABLET ORAL 2 TIMES DAILY
Qty: 60 TABLET | Refills: 0 | Status: SHIPPED | OUTPATIENT
Start: 2020-11-18 | End: 2020-12-18 | Stop reason: SDUPTHER

## 2020-11-19 ENCOUNTER — TELEPHONE (OUTPATIENT)
Dept: CARDIOLOGY CLINIC | Facility: CLINIC | Age: 60
End: 2020-11-19

## 2020-11-20 ENCOUNTER — OFFICE VISIT (OUTPATIENT)
Dept: CARDIOLOGY CLINIC | Facility: CLINIC | Age: 60
End: 2020-11-20
Payer: COMMERCIAL

## 2020-11-20 VITALS
HEIGHT: 64 IN | DIASTOLIC BLOOD PRESSURE: 84 MMHG | SYSTOLIC BLOOD PRESSURE: 140 MMHG | HEART RATE: 77 BPM | WEIGHT: 237 LBS | BODY MASS INDEX: 40.46 KG/M2

## 2020-11-20 DIAGNOSIS — I48.0 PAROXYSMAL ATRIAL FIBRILLATION (HCC): ICD-10-CM

## 2020-11-20 DIAGNOSIS — E78.2 MIXED HYPERLIPIDEMIA: ICD-10-CM

## 2020-11-20 DIAGNOSIS — I10 ESSENTIAL HYPERTENSION: ICD-10-CM

## 2020-11-20 DIAGNOSIS — I50.33 ACUTE ON CHRONIC HEART FAILURE WITH PRESERVED EJECTION FRACTION (HFPEF) (HCC): Primary | ICD-10-CM

## 2020-11-20 PROCEDURE — 93000 ELECTROCARDIOGRAM COMPLETE: CPT | Performed by: INTERNAL MEDICINE

## 2020-11-20 PROCEDURE — 3008F BODY MASS INDEX DOCD: CPT | Performed by: INTERNAL MEDICINE

## 2020-11-20 PROCEDURE — 99215 OFFICE O/P EST HI 40 MIN: CPT | Performed by: INTERNAL MEDICINE

## 2020-11-20 PROCEDURE — 3077F SYST BP >= 140 MM HG: CPT | Performed by: INTERNAL MEDICINE

## 2020-11-20 PROCEDURE — 3079F DIAST BP 80-89 MM HG: CPT | Performed by: INTERNAL MEDICINE

## 2020-11-20 PROCEDURE — 1036F TOBACCO NON-USER: CPT | Performed by: INTERNAL MEDICINE

## 2020-11-20 RX ORDER — FUROSEMIDE 20 MG/1
40 TABLET ORAL DAILY
Qty: 60 TABLET | Refills: 3 | Status: SHIPPED | OUTPATIENT
Start: 2020-11-20 | End: 2021-01-11

## 2020-11-20 RX ORDER — NITROFURANTOIN MACROCRYSTALS 100 MG/1
100 CAPSULE ORAL 2 TIMES DAILY
COMMUNITY
End: 2020-12-29 | Stop reason: CLARIF

## 2020-11-20 RX ORDER — NITROFURANTOIN 25; 75 MG/1; MG/1
100 CAPSULE ORAL 2 TIMES DAILY
COMMUNITY
End: 2020-12-29 | Stop reason: CLARIF

## 2020-12-01 ENCOUNTER — APPOINTMENT (OUTPATIENT)
Dept: LAB | Facility: CLINIC | Age: 60
End: 2020-12-01
Payer: COMMERCIAL

## 2020-12-01 ENCOUNTER — OFFICE VISIT (OUTPATIENT)
Dept: CARDIOLOGY CLINIC | Facility: CLINIC | Age: 60
End: 2020-12-01
Payer: COMMERCIAL

## 2020-12-01 VITALS
HEART RATE: 76 BPM | DIASTOLIC BLOOD PRESSURE: 42 MMHG | SYSTOLIC BLOOD PRESSURE: 140 MMHG | WEIGHT: 239.5 LBS | OXYGEN SATURATION: 94 % | HEIGHT: 64 IN | BODY MASS INDEX: 40.89 KG/M2

## 2020-12-01 DIAGNOSIS — R21 RASH: ICD-10-CM

## 2020-12-01 DIAGNOSIS — M79.10 MYALGIA: ICD-10-CM

## 2020-12-01 DIAGNOSIS — T78.3XXA ANGIO-EDEMA-URTICARIA, INITIAL ENCOUNTER: ICD-10-CM

## 2020-12-01 DIAGNOSIS — R63.5 WEIGHT GAIN: ICD-10-CM

## 2020-12-01 DIAGNOSIS — M25.522 ARTHRALGIA OF BOTH ELBOWS: ICD-10-CM

## 2020-12-01 DIAGNOSIS — E78.5 HYPERLIPIDEMIA, UNSPECIFIED HYPERLIPIDEMIA TYPE: ICD-10-CM

## 2020-12-01 DIAGNOSIS — M25.521 ARTHRALGIA OF BOTH ELBOWS: ICD-10-CM

## 2020-12-01 DIAGNOSIS — I50.33 ACUTE ON CHRONIC HEART FAILURE WITH PRESERVED EJECTION FRACTION (HFPEF) (HCC): ICD-10-CM

## 2020-12-01 DIAGNOSIS — I50.30 DIASTOLIC CONGESTIVE HEART FAILURE, UNSPECIFIED HF CHRONICITY (HCC): Primary | ICD-10-CM

## 2020-12-01 LAB
25(OH)D3 SERPL-MCNC: 30.2 NG/ML (ref 30–100)
ALBUMIN SERPL BCP-MCNC: 4.1 G/DL (ref 3.5–5)
ALP SERPL-CCNC: 113 U/L (ref 46–116)
ALT SERPL W P-5'-P-CCNC: 95 U/L (ref 12–78)
ANION GAP SERPL CALCULATED.3IONS-SCNC: 7 MMOL/L (ref 4–13)
AST SERPL W P-5'-P-CCNC: 67 U/L (ref 5–45)
BASOPHILS # BLD AUTO: 0.05 THOUSANDS/ΜL (ref 0–0.1)
BASOPHILS NFR BLD AUTO: 1 % (ref 0–1)
BILIRUB SERPL-MCNC: 0.52 MG/DL (ref 0.2–1)
BUN SERPL-MCNC: 21 MG/DL (ref 5–25)
C4 SERPL-MCNC: 27 MG/DL (ref 10–40)
CALCIUM SERPL-MCNC: 9.4 MG/DL (ref 8.3–10.1)
CHLORIDE SERPL-SCNC: 103 MMOL/L (ref 100–108)
CHOLEST SERPL-MCNC: 301 MG/DL (ref 50–200)
CO2 SERPL-SCNC: 28 MMOL/L (ref 21–32)
CREAT SERPL-MCNC: 0.8 MG/DL (ref 0.6–1.3)
CRP SERPL HS-MCNC: 12.3 MG/L
EOSINOPHIL # BLD AUTO: 0.09 THOUSAND/ΜL (ref 0–0.61)
EOSINOPHIL NFR BLD AUTO: 1 % (ref 0–6)
ERYTHROCYTE [DISTWIDTH] IN BLOOD BY AUTOMATED COUNT: 13.6 % (ref 11.6–15.1)
GFR SERPL CREATININE-BSD FRML MDRD: 79 ML/MIN/1.73SQ M
GLUCOSE P FAST SERPL-MCNC: 126 MG/DL (ref 65–99)
HCT VFR BLD AUTO: 35.1 % (ref 34.8–46.1)
HDLC SERPL-MCNC: 48 MG/DL
HGB BLD-MCNC: 11.1 G/DL (ref 11.5–15.4)
IMM GRANULOCYTES # BLD AUTO: 0.05 THOUSAND/UL (ref 0–0.2)
IMM GRANULOCYTES NFR BLD AUTO: 1 % (ref 0–2)
LDLC SERPL CALC-MCNC: 203 MG/DL (ref 0–100)
LYMPHOCYTES # BLD AUTO: 1.53 THOUSANDS/ΜL (ref 0.6–4.47)
LYMPHOCYTES NFR BLD AUTO: 20 % (ref 14–44)
MCH RBC QN AUTO: 28.3 PG (ref 26.8–34.3)
MCHC RBC AUTO-ENTMCNC: 31.6 G/DL (ref 31.4–37.4)
MCV RBC AUTO: 90 FL (ref 82–98)
MONOCYTES # BLD AUTO: 0.58 THOUSAND/ΜL (ref 0.17–1.22)
MONOCYTES NFR BLD AUTO: 8 % (ref 4–12)
NEUTROPHILS # BLD AUTO: 5.4 THOUSANDS/ΜL (ref 1.85–7.62)
NEUTS SEG NFR BLD AUTO: 69 % (ref 43–75)
NONHDLC SERPL-MCNC: 253 MG/DL
NRBC BLD AUTO-RTO: 0 /100 WBCS
PLATELET # BLD AUTO: 267 THOUSANDS/UL (ref 149–390)
PMV BLD AUTO: 9.8 FL (ref 8.9–12.7)
POTASSIUM SERPL-SCNC: 3.3 MMOL/L (ref 3.5–5.3)
PROT SERPL-MCNC: 7.5 G/DL (ref 6.4–8.2)
RBC # BLD AUTO: 3.92 MILLION/UL (ref 3.81–5.12)
RHEUMATOID FACT SER QL LA: NEGATIVE
SODIUM SERPL-SCNC: 138 MMOL/L (ref 136–145)
TRIGL SERPL-MCNC: 249 MG/DL
TSH SERPL DL<=0.05 MIU/L-ACNC: 2.16 UIU/ML (ref 0.36–3.74)
WBC # BLD AUTO: 7.7 THOUSAND/UL (ref 4.31–10.16)

## 2020-12-01 PROCEDURE — 83520 IMMUNOASSAY QUANT NOS NONAB: CPT

## 2020-12-01 PROCEDURE — 86430 RHEUMATOID FACTOR TEST QUAL: CPT

## 2020-12-01 PROCEDURE — 84165 PROTEIN E-PHORESIS SERUM: CPT | Performed by: PATHOLOGY

## 2020-12-01 PROCEDURE — 85025 COMPLETE CBC W/AUTO DIFF WBC: CPT

## 2020-12-01 PROCEDURE — 80061 LIPID PANEL: CPT

## 2020-12-01 PROCEDURE — 84443 ASSAY THYROID STIM HORMONE: CPT

## 2020-12-01 PROCEDURE — 99214 OFFICE O/P EST MOD 30 MIN: CPT | Performed by: NURSE PRACTITIONER

## 2020-12-01 PROCEDURE — 86160 COMPLEMENT ANTIGEN: CPT

## 2020-12-01 PROCEDURE — 80053 COMPREHEN METABOLIC PANEL: CPT

## 2020-12-01 PROCEDURE — 86376 MICROSOMAL ANTIBODY EACH: CPT

## 2020-12-01 PROCEDURE — 82306 VITAMIN D 25 HYDROXY: CPT

## 2020-12-01 PROCEDURE — 86141 C-REACTIVE PROTEIN HS: CPT

## 2020-12-01 PROCEDURE — 36415 COLL VENOUS BLD VENIPUNCTURE: CPT

## 2020-12-01 PROCEDURE — 84165 PROTEIN E-PHORESIS SERUM: CPT

## 2020-12-01 PROCEDURE — 86800 THYROGLOBULIN ANTIBODY: CPT

## 2020-12-01 RX ORDER — HYDRALAZINE HYDROCHLORIDE 10 MG/1
10 TABLET, FILM COATED ORAL 3 TIMES DAILY
Qty: 90 TABLET | Refills: 1 | Status: SHIPPED | OUTPATIENT
Start: 2020-12-01 | End: 2020-12-18

## 2020-12-01 RX ORDER — EZETIMIBE 10 MG/1
10 TABLET ORAL DAILY
Qty: 30 TABLET | Refills: 0 | Status: SHIPPED | OUTPATIENT
Start: 2020-12-01 | End: 2020-12-03

## 2020-12-02 ENCOUNTER — TELEPHONE (OUTPATIENT)
Dept: CARDIOLOGY CLINIC | Facility: CLINIC | Age: 60
End: 2020-12-02

## 2020-12-02 ENCOUNTER — TELEPHONE (OUTPATIENT)
Dept: FAMILY MEDICINE CLINIC | Facility: CLINIC | Age: 60
End: 2020-12-02

## 2020-12-02 LAB
ALBUMIN SERPL ELPH-MCNC: 5.1 G/DL (ref 3.5–5)
ALBUMIN SERPL ELPH-MCNC: 65.4 % (ref 52–65)
ALPHA1 GLOB SERPL ELPH-MCNC: 0.41 G/DL (ref 0.1–0.4)
ALPHA1 GLOB SERPL ELPH-MCNC: 5.2 % (ref 2.5–5)
ALPHA2 GLOB SERPL ELPH-MCNC: 0.87 G/DL (ref 0.4–1.2)
ALPHA2 GLOB SERPL ELPH-MCNC: 11.2 % (ref 7–13)
BETA GLOB ABNORMAL SERPL ELPH-MCNC: 0.53 G/DL (ref 0.4–0.8)
BETA1 GLOB SERPL ELPH-MCNC: 6.8 % (ref 5–13)
BETA2 GLOB SERPL ELPH-MCNC: 3.9 % (ref 2–8)
BETA2+GAMMA GLOB SERPL ELPH-MCNC: 0.3 G/DL (ref 0.2–0.5)
GAMMA GLOB ABNORMAL SERPL ELPH-MCNC: 0.59 G/DL (ref 0.5–1.6)
GAMMA GLOB SERPL ELPH-MCNC: 7.5 % (ref 12–22)
IGG/ALB SER: 1.89 {RATIO} (ref 1.1–1.8)
PROT PATTERN SERPL ELPH-IMP: ABNORMAL
PROT SERPL-MCNC: 7.1 G/DL (ref 6.4–8.2)
THYROGLOB AB SERPL-ACNC: <1 IU/ML (ref 0–0.9)
THYROPEROXIDASE AB SERPL-ACNC: <9 IU/ML (ref 0–34)

## 2020-12-03 ENCOUNTER — TELEPHONE (OUTPATIENT)
Dept: CARDIOLOGY CLINIC | Facility: CLINIC | Age: 60
End: 2020-12-03

## 2020-12-03 ENCOUNTER — EVALUATION (OUTPATIENT)
Dept: PHYSICAL THERAPY | Facility: MEDICAL CENTER | Age: 60
End: 2020-12-03
Payer: COMMERCIAL

## 2020-12-03 DIAGNOSIS — M54.16 LUMBAR RADICULOPATHY: Primary | ICD-10-CM

## 2020-12-03 PROCEDURE — 97161 PT EVAL LOW COMPLEX 20 MIN: CPT | Performed by: PHYSICAL THERAPIST

## 2020-12-03 PROCEDURE — 97110 THERAPEUTIC EXERCISES: CPT | Performed by: PHYSICAL THERAPIST

## 2020-12-04 ENCOUNTER — TELEPHONE (OUTPATIENT)
Dept: SLEEP CENTER | Facility: CLINIC | Age: 60
End: 2020-12-04

## 2020-12-04 ENCOUNTER — OFFICE VISIT (OUTPATIENT)
Dept: PHYSICAL THERAPY | Facility: MEDICAL CENTER | Age: 60
End: 2020-12-04
Payer: COMMERCIAL

## 2020-12-04 DIAGNOSIS — E78.2 MIXED HYPERLIPIDEMIA: Primary | ICD-10-CM

## 2020-12-04 DIAGNOSIS — M54.16 LUMBAR RADICULOPATHY: Primary | ICD-10-CM

## 2020-12-04 LAB — TRYPTASE SERPL-MCNC: 8.3 UG/L (ref 2.2–13.2)

## 2020-12-04 PROCEDURE — 97110 THERAPEUTIC EXERCISES: CPT | Performed by: PHYSICAL THERAPIST

## 2020-12-04 PROCEDURE — 97140 MANUAL THERAPY 1/> REGIONS: CPT | Performed by: PHYSICAL THERAPIST

## 2020-12-07 ENCOUNTER — OFFICE VISIT (OUTPATIENT)
Dept: PHYSICAL THERAPY | Facility: MEDICAL CENTER | Age: 60
End: 2020-12-07
Payer: COMMERCIAL

## 2020-12-07 DIAGNOSIS — M54.16 LUMBAR RADICULOPATHY: Primary | ICD-10-CM

## 2020-12-07 PROCEDURE — 97140 MANUAL THERAPY 1/> REGIONS: CPT | Performed by: PHYSICAL THERAPIST

## 2020-12-07 PROCEDURE — 97110 THERAPEUTIC EXERCISES: CPT | Performed by: PHYSICAL THERAPIST

## 2020-12-08 ENCOUNTER — VBI (OUTPATIENT)
Dept: ADMINISTRATIVE | Facility: OTHER | Age: 60
End: 2020-12-08

## 2020-12-08 ENCOUNTER — HOSPITAL ENCOUNTER (OUTPATIENT)
Dept: ULTRASOUND IMAGING | Facility: MEDICAL CENTER | Age: 60
Discharge: HOME | End: 2020-12-08
Payer: COMMERCIAL

## 2020-12-08 DIAGNOSIS — R10.2 PELVIC PRESSURE IN FEMALE: ICD-10-CM

## 2020-12-08 PROCEDURE — 76856 US EXAM PELVIC COMPLETE: CPT

## 2020-12-08 PROCEDURE — 76830 TRANSVAGINAL US NON-OB: CPT

## 2020-12-09 ENCOUNTER — APPOINTMENT (OUTPATIENT)
Dept: PHYSICAL THERAPY | Facility: MEDICAL CENTER | Age: 60
End: 2020-12-09
Payer: COMMERCIAL

## 2020-12-10 ENCOUNTER — TELEPHONE (OUTPATIENT)
Dept: FAMILY MEDICINE CLINIC | Facility: CLINIC | Age: 60
End: 2020-12-10

## 2020-12-11 ENCOUNTER — OFFICE VISIT (OUTPATIENT)
Dept: PHYSICAL THERAPY | Facility: MEDICAL CENTER | Age: 60
End: 2020-12-11
Payer: COMMERCIAL

## 2020-12-11 ENCOUNTER — HOSPITAL ENCOUNTER (OUTPATIENT)
Dept: SLEEP CENTER | Facility: CLINIC | Age: 60
Discharge: HOME | End: 2020-12-11
Payer: COMMERCIAL

## 2020-12-11 DIAGNOSIS — M54.16 LUMBAR RADICULOPATHY: Primary | ICD-10-CM

## 2020-12-11 DIAGNOSIS — R53.82 CHRONIC FATIGUE: ICD-10-CM

## 2020-12-11 PROCEDURE — 97140 MANUAL THERAPY 1/> REGIONS: CPT | Performed by: PHYSICAL THERAPIST

## 2020-12-11 PROCEDURE — 97110 THERAPEUTIC EXERCISES: CPT | Performed by: PHYSICAL THERAPIST

## 2020-12-11 PROCEDURE — G0399 HOME SLEEP TEST/TYPE 3 PORTA: HCPCS

## 2020-12-14 ENCOUNTER — LAB (OUTPATIENT)
Dept: LAB | Facility: MEDICAL CENTER | Age: 60
End: 2020-12-14
Payer: COMMERCIAL

## 2020-12-14 ENCOUNTER — TELEPHONE (OUTPATIENT)
Dept: OTHER | Facility: OTHER | Age: 60
End: 2020-12-14

## 2020-12-14 ENCOUNTER — APPOINTMENT (OUTPATIENT)
Dept: PHYSICAL THERAPY | Facility: MEDICAL CENTER | Age: 60
End: 2020-12-14
Payer: COMMERCIAL

## 2020-12-14 LAB
ALBUMIN SERPL BCP-MCNC: 4.3 G/DL (ref 3.5–5)
ALP SERPL-CCNC: 125 U/L (ref 46–116)
ALT SERPL W P-5'-P-CCNC: 46 U/L (ref 12–78)
ANION GAP SERPL CALCULATED.3IONS-SCNC: 8 MMOL/L (ref 4–13)
AST SERPL W P-5'-P-CCNC: 28 U/L (ref 5–45)
BILIRUB SERPL-MCNC: 0.47 MG/DL (ref 0.2–1)
BUN SERPL-MCNC: 21 MG/DL (ref 5–25)
CALCIUM SERPL-MCNC: 9.6 MG/DL (ref 8.3–10.1)
CHLORIDE SERPL-SCNC: 106 MMOL/L (ref 100–108)
CO2 SERPL-SCNC: 28 MMOL/L (ref 21–32)
CREAT SERPL-MCNC: 0.88 MG/DL (ref 0.6–1.3)
GFR SERPL CREATININE-BSD FRML MDRD: 71 ML/MIN/1.73SQ M
GLUCOSE SERPL-MCNC: 123 MG/DL (ref 65–140)
POTASSIUM SERPL-SCNC: 3.8 MMOL/L (ref 3.5–5.3)
PROT SERPL-MCNC: 7.6 G/DL (ref 6.4–8.2)
SODIUM SERPL-SCNC: 142 MMOL/L (ref 136–145)

## 2020-12-14 PROCEDURE — 80053 COMPREHEN METABOLIC PANEL: CPT

## 2020-12-14 PROCEDURE — 36415 COLL VENOUS BLD VENIPUNCTURE: CPT

## 2020-12-16 ENCOUNTER — OFFICE VISIT (OUTPATIENT)
Dept: PHYSICAL THERAPY | Facility: MEDICAL CENTER | Age: 60
End: 2020-12-16
Payer: COMMERCIAL

## 2020-12-16 DIAGNOSIS — M54.16 LUMBAR RADICULOPATHY: Primary | ICD-10-CM

## 2020-12-16 PROCEDURE — 97140 MANUAL THERAPY 1/> REGIONS: CPT | Performed by: PHYSICAL THERAPIST

## 2020-12-16 PROCEDURE — 97110 THERAPEUTIC EXERCISES: CPT | Performed by: PHYSICAL THERAPIST

## 2020-12-17 ENCOUNTER — APPOINTMENT (OUTPATIENT)
Dept: PHYSICAL THERAPY | Facility: MEDICAL CENTER | Age: 60
End: 2020-12-17
Payer: COMMERCIAL

## 2020-12-18 ENCOUNTER — APPOINTMENT (OUTPATIENT)
Dept: PHYSICAL THERAPY | Facility: MEDICAL CENTER | Age: 60
End: 2020-12-18
Payer: COMMERCIAL

## 2020-12-18 ENCOUNTER — OFFICE VISIT (OUTPATIENT)
Dept: FAMILY MEDICINE CLINIC | Facility: CLINIC | Age: 60
End: 2020-12-18
Payer: COMMERCIAL

## 2020-12-18 ENCOUNTER — OFFICE VISIT (OUTPATIENT)
Dept: SLEEP CENTER | Facility: CLINIC | Age: 60
End: 2020-12-18
Payer: COMMERCIAL

## 2020-12-18 VITALS
BODY MASS INDEX: 40.36 KG/M2 | WEIGHT: 236.4 LBS | DIASTOLIC BLOOD PRESSURE: 98 MMHG | SYSTOLIC BLOOD PRESSURE: 148 MMHG | HEIGHT: 64 IN | TEMPERATURE: 96.3 F

## 2020-12-18 VITALS
HEART RATE: 65 BPM | WEIGHT: 237.8 LBS | HEIGHT: 64 IN | BODY MASS INDEX: 40.6 KG/M2 | SYSTOLIC BLOOD PRESSURE: 132 MMHG | DIASTOLIC BLOOD PRESSURE: 88 MMHG

## 2020-12-18 DIAGNOSIS — R40.0 DAYTIME SLEEPINESS: ICD-10-CM

## 2020-12-18 DIAGNOSIS — F41.9 ANXIETY AND DEPRESSION: ICD-10-CM

## 2020-12-18 DIAGNOSIS — G89.29 CHRONIC INTRACTABLE HEADACHE, UNSPECIFIED HEADACHE TYPE: ICD-10-CM

## 2020-12-18 DIAGNOSIS — I48.0 PAROXYSMAL ATRIAL FIBRILLATION (HCC): ICD-10-CM

## 2020-12-18 DIAGNOSIS — G25.81 RLS (RESTLESS LEGS SYNDROME): ICD-10-CM

## 2020-12-18 DIAGNOSIS — J45.909 ASTHMA DUE TO SEASONAL ALLERGIES: ICD-10-CM

## 2020-12-18 DIAGNOSIS — J30.1 SEASONAL ALLERGIC RHINITIS DUE TO POLLEN: ICD-10-CM

## 2020-12-18 DIAGNOSIS — I10 BENIGN ESSENTIAL HYPERTENSION: Primary | ICD-10-CM

## 2020-12-18 DIAGNOSIS — G47.33 OBSTRUCTIVE SLEEP APNEA: Primary | ICD-10-CM

## 2020-12-18 DIAGNOSIS — E66.01 MORBID OBESITY WITH BMI OF 40.0-44.9, ADULT (HCC): ICD-10-CM

## 2020-12-18 DIAGNOSIS — G47.09 OTHER INSOMNIA: ICD-10-CM

## 2020-12-18 DIAGNOSIS — R53.82 CHRONIC FATIGUE: ICD-10-CM

## 2020-12-18 DIAGNOSIS — F32.A ANXIETY AND DEPRESSION: ICD-10-CM

## 2020-12-18 DIAGNOSIS — R51.9 CHRONIC INTRACTABLE HEADACHE, UNSPECIFIED HEADACHE TYPE: ICD-10-CM

## 2020-12-18 DIAGNOSIS — K21.9 GASTROESOPHAGEAL REFLUX DISEASE WITHOUT ESOPHAGITIS: ICD-10-CM

## 2020-12-18 PROCEDURE — 1036F TOBACCO NON-USER: CPT | Performed by: FAMILY MEDICINE

## 2020-12-18 PROCEDURE — 3080F DIAST BP >= 90 MM HG: CPT | Performed by: FAMILY MEDICINE

## 2020-12-18 PROCEDURE — 99214 OFFICE O/P EST MOD 30 MIN: CPT | Performed by: FAMILY MEDICINE

## 2020-12-18 PROCEDURE — 99204 OFFICE O/P NEW MOD 45 MIN: CPT | Performed by: INTERNAL MEDICINE

## 2020-12-18 PROCEDURE — 3008F BODY MASS INDEX DOCD: CPT | Performed by: FAMILY MEDICINE

## 2020-12-18 PROCEDURE — 3077F SYST BP >= 140 MM HG: CPT | Performed by: FAMILY MEDICINE

## 2020-12-18 RX ORDER — SPIRONOLACTONE 25 MG/1
25 TABLET ORAL DAILY
Qty: 30 TABLET | Refills: 5 | Status: SHIPPED | OUTPATIENT
Start: 2020-12-18 | End: 2021-01-13 | Stop reason: SDUPTHER

## 2020-12-18 RX ORDER — LORAZEPAM 0.5 MG/1
0.5 TABLET ORAL 2 TIMES DAILY
Qty: 60 TABLET | Refills: 0 | Status: SHIPPED | OUTPATIENT
Start: 2020-12-18 | End: 2021-01-18 | Stop reason: SDUPTHER

## 2020-12-18 RX ORDER — BUPROPION HYDROCHLORIDE 150 MG/1
TABLET ORAL
Qty: 180 TABLET | Refills: 0 | Status: SHIPPED | OUTPATIENT
Start: 2020-12-18 | End: 2021-05-24

## 2020-12-18 RX ORDER — BUTALBITAL, ACETAMINOPHEN AND CAFFEINE 50; 325; 40 MG/1; MG/1; MG/1
1 TABLET ORAL EVERY 4 HOURS PRN
Qty: 30 TABLET | Refills: 0 | Status: SHIPPED | OUTPATIENT
Start: 2020-12-18 | End: 2020-12-29 | Stop reason: CLARIF

## 2020-12-18 RX ORDER — NALOXONE HYDROCHLORIDE 4 MG/.1ML
SPRAY NASAL
Qty: 1 EACH | Refills: 1 | Status: SHIPPED | OUTPATIENT
Start: 2020-12-18 | End: 2021-05-24

## 2020-12-21 ENCOUNTER — APPOINTMENT (OUTPATIENT)
Dept: PHYSICAL THERAPY | Facility: MEDICAL CENTER | Age: 60
End: 2020-12-21
Payer: COMMERCIAL

## 2020-12-21 PROBLEM — I11.0 HYPERTENSIVE HEART DISEASE WITH CONGESTIVE HEART FAILURE (HCC): Status: ACTIVE | Noted: 2020-12-21

## 2020-12-23 ENCOUNTER — APPOINTMENT (OUTPATIENT)
Dept: PHYSICAL THERAPY | Facility: MEDICAL CENTER | Age: 60
End: 2020-12-23
Payer: COMMERCIAL

## 2020-12-25 DIAGNOSIS — I10 ESSENTIAL HYPERTENSION: ICD-10-CM

## 2020-12-29 ENCOUNTER — OFFICE VISIT (OUTPATIENT)
Dept: CARDIOLOGY CLINIC | Facility: CLINIC | Age: 60
End: 2020-12-29
Payer: COMMERCIAL

## 2020-12-29 VITALS
OXYGEN SATURATION: 96 % | HEART RATE: 77 BPM | SYSTOLIC BLOOD PRESSURE: 160 MMHG | DIASTOLIC BLOOD PRESSURE: 68 MMHG | BODY MASS INDEX: 40.97 KG/M2 | HEIGHT: 64 IN | WEIGHT: 240 LBS

## 2020-12-29 DIAGNOSIS — I10 HYPERTENSION, UNSPECIFIED TYPE: Primary | ICD-10-CM

## 2020-12-29 DIAGNOSIS — I50.32 CHRONIC DIASTOLIC CONGESTIVE HEART FAILURE (HCC): ICD-10-CM

## 2020-12-29 DIAGNOSIS — Z99.89 OSA ON CPAP: ICD-10-CM

## 2020-12-29 DIAGNOSIS — E66.01 CLASS 3 SEVERE OBESITY DUE TO EXCESS CALORIES WITH SERIOUS COMORBIDITY AND BODY MASS INDEX (BMI) OF 40.0 TO 44.9 IN ADULT (HCC): ICD-10-CM

## 2020-12-29 DIAGNOSIS — G47.33 OSA ON CPAP: ICD-10-CM

## 2020-12-29 DIAGNOSIS — E78.5 HYPERLIPIDEMIA, UNSPECIFIED HYPERLIPIDEMIA TYPE: ICD-10-CM

## 2020-12-29 PROCEDURE — 99215 OFFICE O/P EST HI 40 MIN: CPT | Performed by: NURSE PRACTITIONER

## 2020-12-29 PROCEDURE — 1036F TOBACCO NON-USER: CPT | Performed by: NURSE PRACTITIONER

## 2020-12-29 PROCEDURE — 3078F DIAST BP <80 MM HG: CPT | Performed by: NURSE PRACTITIONER

## 2020-12-29 PROCEDURE — 3008F BODY MASS INDEX DOCD: CPT | Performed by: NURSE PRACTITIONER

## 2020-12-29 PROCEDURE — 3077F SYST BP >= 140 MM HG: CPT | Performed by: NURSE PRACTITIONER

## 2020-12-29 RX ORDER — METOPROLOL SUCCINATE 100 MG/1
TABLET, EXTENDED RELEASE ORAL
Qty: 180 TABLET | Refills: 0 | Status: SHIPPED | OUTPATIENT
Start: 2020-12-29 | End: 2021-01-29 | Stop reason: SDUPTHER

## 2020-12-29 RX ORDER — HYDRALAZINE HYDROCHLORIDE 10 MG/1
10 TABLET, FILM COATED ORAL 3 TIMES DAILY
COMMUNITY
End: 2020-12-29 | Stop reason: SINTOL

## 2020-12-30 ENCOUNTER — OFFICE VISIT (OUTPATIENT)
Dept: PHYSICAL THERAPY | Facility: MEDICAL CENTER | Age: 60
End: 2020-12-30
Payer: COMMERCIAL

## 2020-12-30 DIAGNOSIS — M54.16 LUMBAR RADICULOPATHY: Primary | ICD-10-CM

## 2020-12-30 PROCEDURE — 97140 MANUAL THERAPY 1/> REGIONS: CPT | Performed by: PHYSICAL THERAPIST

## 2020-12-30 PROCEDURE — 97110 THERAPEUTIC EXERCISES: CPT | Performed by: PHYSICAL THERAPIST

## 2020-12-31 ENCOUNTER — TELEPHONE (OUTPATIENT)
Dept: CARDIOLOGY CLINIC | Facility: CLINIC | Age: 60
End: 2020-12-31

## 2021-01-01 DIAGNOSIS — F32.A ANXIETY AND DEPRESSION: ICD-10-CM

## 2021-01-01 DIAGNOSIS — F41.9 ANXIETY AND DEPRESSION: ICD-10-CM

## 2021-01-03 RX ORDER — FLUOXETINE 10 MG/1
CAPSULE ORAL
Qty: 90 CAPSULE | Refills: 0 | Status: SHIPPED | OUTPATIENT
Start: 2021-01-03 | End: 2021-04-01

## 2021-01-11 DIAGNOSIS — I50.33 ACUTE ON CHRONIC HEART FAILURE WITH PRESERVED EJECTION FRACTION (HFPEF) (HCC): ICD-10-CM

## 2021-01-11 RX ORDER — FUROSEMIDE 20 MG/1
40 TABLET ORAL DAILY
Qty: 60 TABLET | Refills: 3 | Status: SHIPPED | OUTPATIENT
Start: 2021-01-11 | End: 2021-05-24

## 2021-01-12 DIAGNOSIS — G47.33 OBSTRUCTIVE SLEEP APNEA: Primary | ICD-10-CM

## 2021-01-13 ENCOUNTER — TELEPHONE (OUTPATIENT)
Dept: SLEEP CENTER | Facility: CLINIC | Age: 61
End: 2021-01-13

## 2021-01-13 DIAGNOSIS — I10 BENIGN ESSENTIAL HYPERTENSION: ICD-10-CM

## 2021-01-13 DIAGNOSIS — I50.9 CHRONIC HEART FAILURE, UNSPECIFIED HEART FAILURE TYPE (HCC): Primary | ICD-10-CM

## 2021-01-13 RX ORDER — FUROSEMIDE 20 MG/1
40 TABLET ORAL 2 TIMES DAILY
Qty: 180 TABLET | Refills: 3 | Status: SHIPPED | OUTPATIENT
Start: 2021-01-13 | End: 2021-01-28

## 2021-01-13 NOTE — TELEPHONE ENCOUNTER
----- Message from Crista Rodriguez MD sent at 1/12/2021  4:54 PM EST -----  Please schedule patient for earlier follow-up with data download

## 2021-01-13 NOTE — TELEPHONE ENCOUNTER
Advised patient of pressure change and desensitization plan  Phone number provided for [facility] [employer] representative     Patient's compliance appointment moved up

## 2021-01-13 NOTE — TELEPHONE ENCOUNTER
I received Rx to start at 5 cm and increase by 1 cm each day to a target of 11 cm H2O or best tolerated  I changed pressure to 5cm, this was done in [place]

## 2021-01-14 RX ORDER — SPIRONOLACTONE 25 MG/1
25 TABLET ORAL DAILY
Qty: 90 TABLET | Refills: 0 | Status: SHIPPED | OUTPATIENT
Start: 2021-01-14 | End: 2021-01-28

## 2021-01-18 DIAGNOSIS — F41.9 ANXIETY AND DEPRESSION: ICD-10-CM

## 2021-01-18 DIAGNOSIS — F32.A ANXIETY AND DEPRESSION: ICD-10-CM

## 2021-01-18 RX ORDER — LORAZEPAM 0.5 MG/1
0.5 TABLET ORAL 2 TIMES DAILY
Qty: 60 TABLET | Refills: 0 | Status: SHIPPED | OUTPATIENT
Start: 2021-01-18 | End: 2021-02-11 | Stop reason: SDUPTHER

## 2021-01-21 DIAGNOSIS — I10 ESSENTIAL HYPERTENSION: ICD-10-CM

## 2021-01-27 DIAGNOSIS — K21.9 GASTROESOPHAGEAL REFLUX DISEASE WITHOUT ESOPHAGITIS: ICD-10-CM

## 2021-01-27 DIAGNOSIS — K25.3 ACUTE GASTRIC ULCER WITHOUT HEMORRHAGE OR PERFORATION: Primary | ICD-10-CM

## 2021-01-27 RX ORDER — SUCRALFATE 1 G/10ML
1 SUSPENSION ORAL 2 TIMES DAILY
Qty: 420 ML | Refills: 0 | Status: SHIPPED | OUTPATIENT
Start: 2021-01-27 | End: 2021-05-13 | Stop reason: SDUPTHER

## 2021-01-28 ENCOUNTER — OFFICE VISIT (OUTPATIENT)
Dept: CARDIOLOGY CLINIC | Facility: CLINIC | Age: 61
End: 2021-01-28
Payer: COMMERCIAL

## 2021-01-28 VITALS
OXYGEN SATURATION: 97 % | WEIGHT: 236.9 LBS | HEART RATE: 68 BPM | HEIGHT: 64 IN | BODY MASS INDEX: 40.45 KG/M2 | SYSTOLIC BLOOD PRESSURE: 150 MMHG | DIASTOLIC BLOOD PRESSURE: 90 MMHG

## 2021-01-28 DIAGNOSIS — E78.2 MIXED HYPERLIPIDEMIA: ICD-10-CM

## 2021-01-28 DIAGNOSIS — I48.0 PAROXYSMAL ATRIAL FIBRILLATION (HCC): Primary | ICD-10-CM

## 2021-01-28 DIAGNOSIS — E66.01 MORBID OBESITY (HCC): ICD-10-CM

## 2021-01-28 DIAGNOSIS — I10 ESSENTIAL HYPERTENSION: ICD-10-CM

## 2021-01-28 DIAGNOSIS — I10 BENIGN ESSENTIAL HYPERTENSION: ICD-10-CM

## 2021-01-28 PROCEDURE — 3080F DIAST BP >= 90 MM HG: CPT | Performed by: INTERNAL MEDICINE

## 2021-01-28 PROCEDURE — 99214 OFFICE O/P EST MOD 30 MIN: CPT | Performed by: INTERNAL MEDICINE

## 2021-01-28 PROCEDURE — 1036F TOBACCO NON-USER: CPT | Performed by: INTERNAL MEDICINE

## 2021-01-28 PROCEDURE — 3077F SYST BP >= 140 MM HG: CPT | Performed by: INTERNAL MEDICINE

## 2021-01-28 PROCEDURE — 3008F BODY MASS INDEX DOCD: CPT | Performed by: INTERNAL MEDICINE

## 2021-01-28 RX ORDER — B-COMPLEX WITH VITAMIN C
TABLET ORAL DAILY
COMMUNITY

## 2021-01-28 RX ORDER — SPIRONOLACTONE 25 MG/1
25 TABLET ORAL 2 TIMES DAILY
Qty: 90 TABLET | Refills: 3 | Status: SHIPPED | OUTPATIENT
Start: 2021-01-28 | End: 2021-01-29

## 2021-01-28 NOTE — PROGRESS NOTES
Cardiology Follow Up    Yamileth Morel  1960  [id number]  [address] [facility] [address] [address]  [phone]    1  Paroxysmal atrial fibrillation (HCC)     2  Essential hypertension     3  Mixed hyperlipidemia  Lipid panel   4  Benign essential hypertension  spironolactone (ALDACTONE) 25 mg tablet   5  Morbid obesity [facility])  Ambulatory referral to Bariatric Surgery     Chief Complaint   Patient presents with    Follow-up     Interval History: Patient is here for continued management of HTN, HLD and PAF  She has noted some increased shortness of breath and fatigue which is ongoing  She is working on getting her CPAP titrated to treat sleep apnea  No reported chest pain, palpitations, lightheadedness, syncope, LE edema, orthopnea, PND, or significant weight changes  Patient Active Problem List   Diagnosis    Acute upper respiratory infection    Allergic rhinitis    Angina pectoris (HCC)    Anxiety and depression    Arthritis    Asthma due to seasonal allergies    Attention disturbance    Benign essential hypertension    Interstitial cystitis    Chronic low back pain    Familial hyperlipidemia    Elevated antinuclear antibody (SON) level    Elevated blood sugar    Essential hypertension    Female pelvic pain    Hyperlipidemia    Lipid disorder    Migraines    Morbid obesity (HCC)    Tick bite    Tobacco abuse    Venous insufficiency    Anxiety    Insomnia    Snoring    Cervicalgia    Chronic daily headache    AMD (age-related macular degeneration), bilateral    Allergic reaction    Palpitations and chest pain      Intertrigo    Epigastric pain    Right upper quadrant pain    Acute gastric ulcer without hemorrhage or perforation    Tinea corporis    Gastroesophageal reflux disease without esophagitis    Allergic conjunctivitis of both eyes    Intrinsic atopic dermatitis    Angio-edema    Vasomotor rhinitis    Other chest pain    Low TSH level    Paroxysmal atrial fibrillation (HCC)    Vaginal candidiasis    WELLINGTON (dyspnea on exertion)    Hair loss    Patellar tendinitis of left knee    Injury of toe on right foot    Wheezing    Lumbar radiculopathy    Chronic fatigue    Acute pyelonephritis    Obstructive sleep apnea    Hypertensive heart disease with congestive heart failure (HCC)     Past Medical History:   Diagnosis Date    A-fib ([facility] [address] ) 2020    Arthritis     Asthma     Colon polyp     GERD (gastroesophageal reflux disease)     Heart murmur     Hives     Hyperlipidemia     Hypertension     Infertility, female     Migraine     Palpitations     Psychiatric disorder     Wears glasses      Social History     Socioeconomic History    Marital status:      Spouse name: Not on file    Number of children: 0    Years of education: Not on file    Highest education level: Not on file   Occupational History    Occupation: Urgent Care      Comment: full-time    Social Needs    Financial resource strain: Not on file    Food insecurity     Worry: Not on file     Inability: Not on file    Transportation needs     Medical: Not on file     Non-medical: Not on file   Tobacco Use    Smoking status: Former Smoker     Packs/day: 0 50     Years: 10 00     Pack years: 5 00     Quit date:      Years since quittin 0    Smokeless tobacco: Never Used   Substance and Sexual Activity    Alcohol use: No    Drug use: No    Sexual activity: Not Currently   Lifestyle    Physical activity     Days per week: 0 days     Minutes per session: 0 min    Stress: Not on file   Relationships    Social connections     Talks on phone: Not on file     Gets together: Not on file     Attends Scientologist service: Not on file     Active member of club or organization: Not on file     Attends meetings of clubs or organizations: Not on file     Relationship status: Not on file    Intimate partner violence     Fear of current or ex partner: Not on file     Emotionally abused: Not on file     Physically abused: Not on file     Forced sexual activity: Not on file   Other Topics Concern    Not on file   Social History Narrative    Who lives in your home: Alone     What type of home do you live in: Apartment     Age of your home: 1952 built     How long have you been living there: 5 yrs     Type of heat: forced hot air     Type of fuel: electric     What type of jamin is in your bedroom: carpet jamin     Do you have the following in or near your home:    Air products: Humidifier in the bedroom/kitchen     Pests: none     Pets: none     Are pets allowed in bedroom: N/A     Open fields, wooded areas nearby: No     Basement: [id number]-pzefp-no mold     Exposure to second hand smoke: No    Central air     Habits:    Caffeine: Hot tea 1 cup daily- ice tea 1 cup in the afternoon    Chocolate: Occasionally       Family History   Problem Relation Age of Onset    Lung cancer Mother     Brain cancer Mother     Diabetes Father     Depression Father     Other Father         septic     Diabetes Other     Allergies Sister     Hashimoto's thyroiditis Sister     Hodgkin's lymphoma Brother     Abdominal aortic aneurysm Sister     Diabetes Sister     No Known Problems Sister     No Known Problems Brother     No Known Problems Maternal Aunt      Past Surgical History:   Procedure Laterality Date    COLONOSCOPY      EGD      EXPLORATORY LAPAROTOMY      for infertility    HAND SURGERY      Hand excision of tendon cyst    WISDOM TOOTH EXTRACTION         Current Outpatient Medications:     acetaminophen (TYLENOL) 325 mg tablet, Take 650 mg by mouth every 6 (six) hours as needed for mild pain, Disp: , Rfl:     apixaban (Eliquis) 5 mg, Take 1 tablet (5 mg total) by mouth 2 (two) times a day Sample: lot [id number], expir 4/2022, Disp: 56 tablet, Rfl: 0   azelastine (ASTELIN) 0 1 % nasal spray, 1 spray into each nostril 2 (two) times a day Use in each nostril as directed, Disp: 1 Bottle, Rfl: 12    buPROPion (WELLBUTRIN XL) 150 mg 24 hr tablet, TAKE ONE TABLET BY MOUTH TWICE DAILY , Disp: 180 tablet, Rfl: 0    Carafate 1 GM/10ML suspension, Take 10 mL (1 g total) by mouth 2 (two) times a day, Disp: 420 mL, Rfl: 0    cetirizine (ZyrTEC) 10 mg tablet, Take 1 tablet (10 mg total) by mouth daily for 365 doses, Disp: 30 tablet, Rfl: 11    chlorhexidine (PERIDEX) 0 12 % solution, , Disp: , Rfl:     Cholecalciferol (VITAMIN D-3) 125 MCG (5000 UT) TABS, Take 1 tablet by mouth daily OVER THE COUNTER, Disp: 30 tablet, Rfl: 11    Dexilant 60 MG capsule, Take 1 capsule (60 mg total) by mouth daily, Disp: 90 capsule, Rfl: 0    diclofenac sodium (VOLTAREN) 1 %, Apply 2 g topically 4 (four) times a day (Patient taking differently: Apply 2 g topically as needed ), Disp: 5 Tube, Rfl: 1    econazole nitrate 1 % cream, apply topically daily (Patient taking differently: Apply topically as needed ), Disp: 30 g, Rfl: 0    ELDERBERRY PO, Take by mouth daily, Disp: , Rfl:     Epinastine HCl 0 05 % ophthalmic solution, Administer 1 drop to both eyes 2 (two) times a day, Disp: 5 mL, Rfl: 12    Evolocumab 140 MG/ML SOAJ, Inject 1 mL (140 mg total) under the skin every 14 (fourteen) days, Disp: 2 pen, Rfl: 3    fexofenadine (ALLEGRA) 180 MG tablet, Take 1 tablet (180 mg total) by mouth daily IN AM, Disp: 30 tablet, Rfl: 12    FLUoxetine (PROzac) 10 mg capsule, TAKE ONE CAPSULE BY MOUTH ONCE DAILY, Disp: 90 capsule, Rfl: 0    fluticasone (FLONASE) 50 mcg/act nasal spray, 1 spray into each nostril daily for 365 doses, Disp: 1 Bottle, Rfl: 12    fluticasone-salmeterol (ADVAIR DISKUS) 250-50 mcg/dose inhaler, Inhale 1 puff 2 (two) times a day Rinse mouth after use , Disp: 1 Inhaler, Rfl: 5    fluticasone-vilanterol (BREO ELLIPTA) 200-25 MCG/INH inhaler, Inhale 1 puff daily Rinse mouth after use , Disp: 1 Inhaler, Rfl: 3    furosemide (LASIX) 20 mg tablet, Take 2 tablets (40 mg total) by mouth daily, Disp: 60 tablet, Rfl: 3    LORazepam (ATIVAN) 0 5 mg tablet, Take 1 tablet (0 5 mg total) by mouth 2 (two) times a day, Disp: 60 tablet, Rfl: 0    metoprolol succinate (TOPROL-XL) 100 mg 24 hr tablet, TAKE ONE TABLET BY MOUTH TWICE DAILY , Disp: 180 tablet, Rfl: 0    montelukast (SINGULAIR) 10 mg tablet, Take 1 tablet (10 mg total) by mouth daily at bedtime, Disp: 90 tablet, Rfl: 3    MULTIPLE VITAMIN PO, Take by mouth, Disp: , Rfl:     omeprazole (PriLOSEC) 20 mg delayed release capsule, Take 1 capsule (20 mg total) by mouth daily, Disp: 90 capsule, Rfl: 1    phenazopyridine (Pyridium) 200 mg tablet, Take 1 tablet (200 mg total) by mouth 3 (three) times a day with meals (Patient taking differently: Take 200 mg by mouth as needed ), Disp: 30 tablet, Rfl: 1    spironolactone (ALDACTONE) 25 mg tablet, Take 1 tablet (25 mg total) by mouth 2 (two) times a day, Disp: 90 tablet, Rfl: 3    terconazole (TERAZOL 3) 0 8 % vaginal cream, INSERT 1 APPLICATOR INTO THE VAGINA DAILY AT BEDTIME  (Patient taking differently: Insert 1 applicator into the vagina as needed ), Disp: 20 g, Rfl: 0    triamcinolone (KENALOG) 0 1 % cream, Apply topically 2 (two) times a day, Disp: 30 g, Rfl: 1    Zinc 100 MG TABS, Take by mouth daily, Disp: , Rfl:     hydrocortisone-pramoxine (PROCTOFOAM-HC) rectal foam, Insert 1 applicator into the rectum 2 (two) times a day (Patient taking differently: Insert 1 applicator into the rectum as needed ), Disp: 30 g, Rfl: 1    naloxone (NARCAN) 4 mg/0 1 mL nasal spray, Administer 1 spray into a nostril  If no response after 2-3 minutes, give another dose in the other nostril using a new spray   (Patient not taking: Reported on 12/18/2020), Disp: 1 each, Rfl: 1  Allergies   Allergen Reactions    Ace Inhibitors Angioedema    Beta Adrenergic Blockers Anaphylaxis Anaphylaxis  Anaphylaxis    Amlodipine Edema    Atorvastatin Other (See Comments)     "throat closing up"    Barium Sulfate Other (See Comments)     hives throat closing    Benicar [Olmesartan] Angioedema     See Allergy note from 9/11/2008   Hydralazine      Lip swelling    Other Other (See Comments)     Other reaction(s): angioadema  Other reaction(s):  Other (See Comments)  Preservatives- itching throat closes, hi  Other reaction(s): angioadema  E Z Cat - hives throat closes itching,  Preservatives- itching throat closes, hi    Sulfa Antibiotics Other (See Comments)     stuffiness,itching,hives,throat closing    Valsartan Angioedema       Labs:  Orders Only on 12/04/2020   Component Date Value    Sodium 12/14/2020 142     Potassium 12/14/2020 3 8     Chloride 12/14/2020 106     CO2 12/14/2020 28     ANION GAP 12/14/2020 8     BUN 12/14/2020 21     Creatinine 12/14/2020 0 88     Glucose 12/14/2020 123     Calcium 12/14/2020 9 6     AST 12/14/2020 28     ALT 12/14/2020 46     Alkaline Phosphatase 12/14/2020 125*    Total Protein 12/14/2020 7 6     Albumin 12/14/2020 4 3     Total Bilirubin 12/14/2020 0 47     eGFR 12/14/2020 71    Appointment on 12/01/2020   Component Date Value    C4, COMPLEMENT 12/01/2020 27 0     WBC 12/01/2020 7 70     RBC 12/01/2020 3 92     Hemoglobin 12/01/2020 11 1*    Hematocrit 12/01/2020 35 1     MCV 12/01/2020 90     MCH 12/01/2020 28 3     MCHC 12/01/2020 31 6     RDW 12/01/2020 13 6     MPV 12/01/2020 9 8     Platelets 84/58/6221 267     nRBC 12/01/2020 0     Neutrophils Relative 12/01/2020 69     Immat GRANS % 12/01/2020 1     Lymphocytes Relative 12/01/2020 20     Monocytes Relative 12/01/2020 8     Eosinophils Relative 12/01/2020 1     Basophils Relative 12/01/2020 1     Neutrophils Absolute 12/01/2020 5 40     Immature Grans Absolute 12/01/2020 0 05     Lymphocytes Absolute 12/01/2020 1 53     Monocytes Absolute 12/01/2020 0 58     Eosinophils Absolute 12/01/2020 0 09     Basophils Absolute 12/01/2020 0 05     Sodium 12/01/2020 138     Potassium 12/01/2020 3 3*    Chloride 12/01/2020 103     CO2 12/01/2020 28     ANION GAP 12/01/2020 7     BUN 12/01/2020 21     Creatinine 12/01/2020 0 80     Glucose, Fasting 12/01/2020 126*    Calcium 12/01/2020 9 4     AST 12/01/2020 67*    ALT 12/01/2020 95*    Alkaline Phosphatase 12/01/2020 113     Total Protein 12/01/2020 7 5     Albumin 12/01/2020 4 1     Total Bilirubin 12/01/2020 0 52     eGFR 12/01/2020 79     Tryptase 12/01/2020 8 3     A/G Ratio 12/01/2020 1 89*    Albumin Electrophoresis 12/01/2020 65 4*    Albumin CONC 12/01/2020 5 10*    Alpha 1 12/01/2020 5 2*    ALPHA 1 CONC 12/01/2020 0 41*    Alpha 2 12/01/2020 11 2     ALPHA 2 CONC 12/01/2020 0 87     Beta-1 12/01/2020 6 8     BETA 1 CONC 12/01/2020 0 53     Beta-2 12/01/2020 3 9     BETA 2 CONC 12/01/2020 0 30     Gamma Globulin 12/01/2020 7 5*    GAMMA CONC 12/01/2020 0 59     Total Protein 12/01/2020 7 1     SPEP Interpretation 12/01/2020 No monoclonal bands noted  Reviewed by: Kimberli Alexander MD ([id number]) **Electronic Signature**     Cholesterol 12/01/2020 301*    Triglycerides 12/01/2020 249*    HDL, Direct 12/01/2020 48     LDL Calculated 12/01/2020 203*    Non-HDL-Chol (CHOL-HDL) 12/01/2020 253     CRP, High Sensitivity 12/01/2020 12 30     Vit D, 25-Hydroxy 12/01/2020 30 2     TSH 3RD GENERATON 12/01/2020 2 160     Rheumatoid Factor 12/01/2020 Negative     THYROID MICROSOMAL ANTIB* 12/01/2020 <9     Thyroglobulin Ab 12/01/2020 <1 0    Office Visit on 11/04/2020   Component Date Value    Urine Culture 11/04/2020 >100,000 cfu/ml Escherichia coli*   Office Visit on 10/22/2020   Component Date Value    SL AMB CLINICAL INFORMAT* 10/22/2020 None given     LMP 10/22/2020 NONE GIVEN     Prev  PAP 10/22/2020 NONE GIVEN     Prev   BX 10/22/2020 NONE GIVEN     Source 10/22/2020 Endocervix     Statement of Adequacy 10/22/2020      Interpretation/Result 10/22/2020 Negative for intraepithelial lesion or malignancy   Comment 10/22/2020 This Pap test has been evaluated with computer assisted technology   SL ISAIAH CYTOTECHNOLOGIST: 10/22/2020      Comment 10/22/2020      HPV mRNA E6/E7 10/22/2020 Not Detected    Office Visit on 10/08/2020   Component Date Value    WBC 10/19/2020 7 38     RBC 10/19/2020 3 98     Hemoglobin 10/19/2020 11 2*    Hematocrit 10/19/2020 36 4     MCV 10/19/2020 92     MCH 10/19/2020 28 1     MCHC 10/19/2020 30 8*    RDW 10/19/2020 12 8     MPV 10/19/2020 9 6     Platelets 00/46/9473 284     nRBC 10/19/2020 0     Neutrophils Relative 10/19/2020 65     Immat GRANS % 10/19/2020 1     Lymphocytes Relative 10/19/2020 21     Monocytes Relative 10/19/2020 10     Eosinophils Relative 10/19/2020 2     Basophils Relative 10/19/2020 1     Neutrophils Absolute 10/19/2020 4 79     Immature Grans Absolute 10/19/2020 0 06     Lymphocytes Absolute 10/19/2020 1 57     Monocytes Absolute 10/19/2020 0 73     Eosinophils Absolute 10/19/2020 0 17     Basophils Absolute 10/19/2020 0 06    Appointment on 08/20/2020   Component Date Value    TSH 3RD GENERATON 08/20/2020 1  100     WBC 08/20/2020 10 73*    RBC 08/20/2020 3 71*    Hemoglobin 08/20/2020 10 8*    Hematocrit 08/20/2020 36 0     MCV 08/20/2020 97     MCH 08/20/2020 29 1     MCHC 08/20/2020 30 0*    RDW 08/20/2020 14 0     MPV 08/20/2020 9 6     Platelets 05/84/4473 291     nRBC 08/20/2020 0     Neutrophils Relative 08/20/2020 63     Immat GRANS % 08/20/2020 1     Lymphocytes Relative 08/20/2020 27     Monocytes Relative 08/20/2020 8     Eosinophils Relative 08/20/2020 1     Basophils Relative 08/20/2020 0     Neutrophils Absolute 08/20/2020 6 80     Immature Grans Absolute 08/20/2020 0 11     Lymphocytes Absolute 08/20/2020 2 89     Monocytes Absolute 08/20/2020 0 82     Eosinophils Absolute 08/20/2020 0 07     Basophils Absolute 08/20/2020 0 04     Sodium 08/20/2020 142     Potassium 08/20/2020 4 2     Chloride 08/20/2020 107     CO2 08/20/2020 29     ANION GAP 08/20/2020 6     BUN 08/20/2020 33*    Creatinine 08/20/2020 0 88     Glucose, Fasting 08/20/2020 70     Calcium 08/20/2020 8 9     AST 08/20/2020 9     ALT 08/20/2020 29     Alkaline Phosphatase 08/20/2020 94     Total Protein 08/20/2020 7 2     Albumin 08/20/2020 3 9     Total Bilirubin 08/20/2020 0 47     eGFR 08/20/2020 71     Cholesterol 08/20/2020 310*    Triglycerides 08/20/2020 133     HDL, Direct 08/20/2020 83     LDL Calculated 08/20/2020 200*    Non-HDL-Chol (CHOL-HDL) 08/20/2020 227     Ferritin 08/20/2020 16     THYROID MICROSOMAL ANTIB* 08/20/2020 <9     Thyroglobulin Ab 08/20/2020 <1 0      Lab Results   Component Value Date    CHOL 413 (H) 11/13/2017    TRIG 249 (H) 12/01/2020    TRIG 159 (H) 11/13/2017    HDL 48 12/01/2020    HDL 94 11/13/2017     Imaging: No results found  Review of Systems:  Review of Systems   Constitutional: Positive for fatigue  Negative for activity change, appetite change, chills, diaphoresis and unexpected weight change  HENT: Negative for hearing loss, nosebleeds and sore throat  Eyes: Negative for photophobia and visual disturbance  Respiratory: Positive for shortness of breath  Negative for cough, chest tightness and wheezing  Cardiovascular: Negative for chest pain, palpitations and leg swelling  Gastrointestinal: Negative for abdominal pain, diarrhea, nausea and vomiting  Endocrine: Negative for polyuria  Genitourinary: Negative for dysuria, frequency and hematuria  Musculoskeletal: Negative for arthralgias, back pain, gait problem and neck pain  Skin: Negative for pallor and rash  Neurological: Negative for dizziness, syncope and headaches  Hematological: Does not bruise/bleed easily     Psychiatric/Behavioral: Negative for behavioral problems and confusion  Physical Exam:  Physical Exam  Vitals signs reviewed  Constitutional:       Appearance: She is well-developed  She is not diaphoretic  HENT:      Head: Normocephalic and atraumatic  Nose: Nose normal    Eyes:      General: No scleral icterus  Pupils: Pupils are equal, round, and reactive to light  Neck:      Musculoskeletal: Normal range of motion and neck supple  Vascular: No JVD  Cardiovascular:      Rate and Rhythm: Normal rate and regular rhythm  Heart sounds: Normal heart sounds  No murmur  No friction rub  No gallop  Pulmonary:      Effort: Pulmonary effort is normal  No respiratory distress  Breath sounds: Normal breath sounds  No wheezing or rales  Abdominal:      General: Bowel sounds are normal  There is no distension  Palpations: Abdomen is soft  Tenderness: There is no abdominal tenderness  Musculoskeletal: Normal range of motion  General: No deformity  Skin:     General: Skin is warm and dry  Findings: No rash  Neurological:      Mental Status: She is alert and oriented to person, place, and time  Cranial Nerves: No cranial nerve deficit  Psychiatric:         Behavior: Behavior normal        Blood pressure 150/90, pulse 68, height 5' 4" (1 626 m), weight 107 kg (236 lb 14 4 oz), SpO2 97 %, not currently breastfeeding  EKG:  Sinus bradycardia with premature atrial contractions  Left ventricular hypertrophy  Abnormal ECG    Discussion/Summary:  Chest pain/palpitations/WELLINGTON: stress test in 2020 revealed no significant changes suggesting ischemic disease  A Holter monitor revealed new onset atrial fibrillation  Echo to assess for structural changes revealed normal LV function and mild to mod AR (unchanged from 2018)  With continued shortness of breath, we checked a repeat stress test with nuclear imaging since prior stress test did not achieve 85% of MPHR in June 2020, which was normal for perfusion     Could be related to recent weight gain and deconditioning as well    HTN: currently on high dose Toprol XL, but with continued elevated BP  She was tried on HCTZ and hydralazine - but HTCZ was changed to lasix for LE edema and hydralazine was stopped due to allergy  Currently also continued on spironolactone, which I will increase to BID dosing to help with BP management  HLD: currently not on medical therapy  May 2019 with LDL of 245 HDL 69  and   Recheck in Dec 2020 revealed LDL of 203  She started on Repatha - repeat levels in March  New onset afib: with paroxysms noted on Holter monitor  Rates controlled with Toprol  CHADS2-Vasc score of 2 for female and HTN  Continued on anticoagulation with Eliquis 5mg BID for stroke prophylaxis

## 2021-01-28 NOTE — PATIENT INSTRUCTIONS
Chronic Hypertension   AMBULATORY CARE:   Hypertension  is high blood pressure  Your blood pressure is the force of your blood moving against the walls of your arteries  Hypertension causes your blood pressure to get so high that your heart has to work much harder than normal  This can damage your heart  Even if you have hypertension for years, lifestyle changes, medicines, or both can help bring your blood pressure to normal   Call 911 for any of the following:   · You have chest pain  · You have any of the following signs of a heart attack:      ? Squeezing, pressure, or pain in your chest    ? You may  also have any of the following:     § Discomfort or pain in your back, neck, jaw, stomach, or arm    § Shortness of breath    § Nausea or vomiting    § Lightheadedness or a sudden cold sweat    · You become confused or have difficulty speaking  · You suddenly feel lightheaded or have trouble breathing  Seek care immediately if:   · You have a severe headache or vision loss  · You have weakness in an arm or leg  Contact your healthcare provider if:   · You feel faint, dizzy, confused, or drowsy  · You have been taking your blood pressure medicine but your pressure is higher than your provider says it should be  · You have questions or concerns about your condition or care  Treatment for chronic hypertension  may include medicine to lower your blood pressure and cholesterol levels  A low cholesterol level helps prevent heart disease and makes it easier to control your blood pressure  Heart disease can make your blood pressure harder to control  You may also need to make lifestyle changes  What you need to know about the stages of hypertension:       · Normal blood pressure is 119/79 or lower   Your healthcare provider may only check your blood pressure each year if it stays at a normal level  · Elevated blood pressure is 120/79 to 129/79   This is sometimes called prehypertension   Your healthcare provider may suggest lifestyle changes to help lower your blood pressure to a normal level  He or she may then check it again in 3 to 6 months  · Stage 1 hypertension is 130/80  to 139/89   Your provider may recommend lifestyle changes, medication, and checks every 3 to 6 months until your blood pressure is controlled  · Stage 2 hypertension is 140/90 or higher   Your provider will recommend lifestyle changes and have you take 2 kinds of hypertension medicines  You will also need to have your blood pressure checked monthly until it is controlled  Manage chronic hypertension:   · Check your blood pressure at home  Avoid smoking, caffeine, and exercise at least 30 minutes before checking your blood pressure  Sit and rest for 5 minutes before you take your blood pressure  Extend your arm and support it on a flat surface  Your arm should be at the same level as your heart  Follow the directions that came with your blood pressure monitor  Check your blood pressure 2 times, 1 minute apart, before you take your medicine in the morning  Also check your blood pressure before your evening meal  Keep a record of your readings and bring it to your follow-up visits  Ask your healthcare provider what your blood pressure should be  · Manage any other health conditions you have  Health conditions such as diabetes can increase your risk for hypertension  Follow your healthcare provider's instructions and take all your medicines as directed  Talk to your healthcare provider about any new health conditions you have recently developed  · Ask about all medicines  Certain medicines can increase your blood pressure  Examples include oral birth control pills, decongestants, herbal supplements, and NSAIDs, such as ibuprofen  Your healthcare provider can tell you which medicines are safe for you to take  This includes prescription and over-the-counter medicines      Lifestyle changes you can make to lower your blood pressure: Your provider may want you to make more lifestyle changes if you are having trouble controlling your blood pressure  This may feel difficult over time, especially if you think you are making good changes but your pressure is still high  It might help to focus on one new change at a time  For example, try to add 1 more day of exercise, or exercise for an extra 10 minutes on 2 days  Small changes can make a big difference  Your healthcare provider can also refer you to specialists such as a dietitian who can help you make small changes  · Limit sodium (salt) as directed  Too much sodium can affect your fluid balance  Check labels to find low-sodium or no-salt-added foods  Some low-sodium foods use potassium salts for flavor  Too much potassium can also cause health problems  Your healthcare provider will tell you how much sodium and potassium are safe for you to have in a day  He or she may recommend that you limit sodium to 2,300 mg a day  · Follow the meal plan recommended by your healthcare provider  A dietitian or your provider can give you more information on low-sodium plans or the DASH (Dietary Approaches to Stop Hypertension) eating plan  The DASH plan is low in sodium, unhealthy fats, and total fat  It is high in potassium, calcium, and fiber  · Exercise to maintain a healthy weight  Exercise at least 30 minutes per day, on most days of the week  This will help decrease your blood pressure  Ask your healthcare provider about the best exercise plan for you  · Decrease stress  This may help lower your blood pressure  Learn ways to relax, such as deep breathing or listening to music  · Limit alcohol as directed  Alcohol can increase your blood pressure  A drink of alcohol is 12 ounces of beer, 5 ounces of wine, or 1½ ounces of liquor  · Do not smoke    Nicotine and other chemicals in cigarettes and cigars can increase your blood pressure and also cause lung damage  Ask your healthcare provider for information if you currently smoke and need help to quit  E-cigarettes or smokeless tobacco still contain nicotine  Talk to your healthcare provider before you use these products  Follow up with your healthcare provider as directed: You will need to return to have your blood pressure checked and to have other lab tests done  Write down your questions so you remember to ask them during your visits  © Copyright [address] Information is for End User's use only and may not be sold, redistributed or otherwise used for commercial purposes  All illustrations and images included in CareNotes® are the copyrighted property of A D [employer] M [employer]  or [address] [place] [address]  The above information is an  only  It is not intended as medical advice for individual conditions or treatments  Talk to your doctor, nurse or pharmacist before following any medical regimen to see if it is safe and effective for you

## 2021-01-29 DIAGNOSIS — I10 BENIGN ESSENTIAL HYPERTENSION: ICD-10-CM

## 2021-01-29 DIAGNOSIS — I10 ESSENTIAL HYPERTENSION: ICD-10-CM

## 2021-01-29 RX ORDER — SPIRONOLACTONE 25 MG/1
25 TABLET ORAL 2 TIMES DAILY
Qty: 180 TABLET | Refills: 2 | Status: SHIPPED | OUTPATIENT
Start: 2021-01-29 | End: 2021-05-24

## 2021-01-29 RX ORDER — METOPROLOL SUCCINATE 100 MG/1
100 TABLET, EXTENDED RELEASE ORAL 2 TIMES DAILY
Qty: 180 TABLET | Refills: 1 | Status: SHIPPED | OUTPATIENT
Start: 2021-01-29 | End: 2021-09-22

## 2021-02-01 ENCOUNTER — TELEPHONE (OUTPATIENT)
Dept: SLEEP CENTER | Facility: CLINIC | Age: 61
End: 2021-02-01

## 2021-02-01 NOTE — TELEPHONE ENCOUNTER
Patient left a message that she is having issues with CPAP at the higher pressure  She has a follow up on 2/5 but she is having a hard time sleeping  Can pressure be lowered? [facility] needs script  Will have compliance attached to phone note

## 2021-02-05 ENCOUNTER — OFFICE VISIT (OUTPATIENT)
Dept: SLEEP CENTER | Facility: CLINIC | Age: 61
End: 2021-02-05
Payer: COMMERCIAL

## 2021-02-05 ENCOUNTER — TELEPHONE (OUTPATIENT)
Dept: SLEEP CENTER | Facility: CLINIC | Age: 61
End: 2021-02-05

## 2021-02-05 VITALS
SYSTOLIC BLOOD PRESSURE: 118 MMHG | HEART RATE: 70 BPM | HEIGHT: 64 IN | DIASTOLIC BLOOD PRESSURE: 74 MMHG | WEIGHT: 236.8 LBS | BODY MASS INDEX: 40.43 KG/M2

## 2021-02-05 DIAGNOSIS — J45.909 ASTHMA DUE TO SEASONAL ALLERGIES: ICD-10-CM

## 2021-02-05 DIAGNOSIS — J30.1 SEASONAL ALLERGIC RHINITIS DUE TO POLLEN: ICD-10-CM

## 2021-02-05 DIAGNOSIS — I48.0 PAROXYSMAL ATRIAL FIBRILLATION (HCC): ICD-10-CM

## 2021-02-05 DIAGNOSIS — E66.01 MORBID OBESITY WITH BMI OF 40.0-44.9, ADULT (HCC): ICD-10-CM

## 2021-02-05 DIAGNOSIS — R53.82 CHRONIC FATIGUE: ICD-10-CM

## 2021-02-05 DIAGNOSIS — K21.9 GASTROESOPHAGEAL REFLUX DISEASE WITHOUT ESOPHAGITIS: ICD-10-CM

## 2021-02-05 DIAGNOSIS — R40.0 DAYTIME SLEEPINESS: ICD-10-CM

## 2021-02-05 DIAGNOSIS — G47.33 OBSTRUCTIVE SLEEP APNEA: Primary | ICD-10-CM

## 2021-02-05 DIAGNOSIS — G25.81 RLS (RESTLESS LEGS SYNDROME): ICD-10-CM

## 2021-02-05 DIAGNOSIS — G47.09 OTHER INSOMNIA: ICD-10-CM

## 2021-02-05 PROCEDURE — 99214 OFFICE O/P EST MOD 30 MIN: CPT | Performed by: INTERNAL MEDICINE

## 2021-02-05 NOTE — PROGRESS NOTES
Follow-Up Note - [facility]   Val Shrestha  56 y o  female  WSZ:[id number]  OQX:[id number]    CC: I saw this patient for follow-up in clinic today for Sleep disordered breathing, Coexisting Sleep and Medical Problems  Home sleep testing in December of 2020 demonstrated: DENZEL (respiratory event index of) 14 /hour  The oxygen desaturation index was 15 5 per hour  Minimum oxygen saturation 67%  and 12 9% of total sleep time was spent with saturations less than 90%  The snore index was 39 1%  Auto titrating Pap was initiated  PFSH, Problem List, Medications & Allergies were reviewed in EMR  Interval changes: none reported  She  has a past medical history of A-fib ([address] ) (03/2020), Arthritis, Asthma, Colon polyp, GERD (gastroesophageal reflux disease), Heart murmur, Hives, Hyperlipidemia, Hypertension, Infertility, female, Migraine, Palpitations, Psychiatric disorder, and Wears glasses  She has a current medication list which includes the following prescription(s): acetaminophen, apixaban, azelastine, bupropion, carafate, chlorhexidine, vitamin d-3, dexilant, elderberry, epinastine hcl, evolocumab, fluoxetine, fluticasone, fluticasone-salmeterol, fluticasone-vilanterol, furosemide, lorazepam, metoprolol succinate, montelukast, multiple vitamin, omeprazole, spironolactone, triamcinolone, zinc, cetirizine, diclofenac sodium, econazole nitrate, fexofenadine, hydrocortisone-pramoxine, naloxone, phenazopyridine, and terconazole  ROS: as attached  Significant for recently has been experiencing frequent episodes of atrial fibrillation  Francis Car PHYSIOLOGICAL DATA REVIEW AND INTERPRETATION:   using PAP > 4 hours/night 6 7% of the time  Estimated DENZEL 5 7/hour at pressure of 11cm H2O @90th percentile     Compliance: reasonable; Sleep disordered breathing:unstable    SUBJECTIVE: Regarding use of PAP, Jose Grace reports:   · She is experiencing significant adverse effects: mask leaks  and claustrophobic  · She is benefiting from use: more rested  when she is able to keep CPAP on  · In the past week, she has been experiencing restless leg symptoms almost nightly  Sleep Routine: She reports getting 3-6 hrs sleep  ; she has difficulty initiating and maintaining sleep   She awakens spontaneously and feels unrefreshed  Excessive Daytime drowsiness reported , tries not to nap but is dozing off unintentionally  She  rated herself at Total score: 6 /24 on the [place] sleepiness scale ]         Habits: reports that she quit smoking about 2 years ago  She has a 5 00 pack-year smoking history  She has never used smokeless tobacco ,  reports no history of alcohol use ,  reports no history of drug use , Caffeine use: excessive until around 6:00 p m , Exercise routine: regular    EXAM: /74   Pulse 70   Ht 5' 4" (1 626 m)   Wt 107 kg (236 lb 12 8 oz)   LMP  (LMP Unknown)   BMI 40 65 kg/m²     Patient is well groomed; well appearing  Skin/Extrem: col & hydration normal; no edema  Psych: cooperativeand in no distress  Mental state:  Appears very anxious, having difficulty processing thought and somewhat confused at times  Also obsessing over sleep  CNS: Alert, orientated, clear & coherent speech  H&N: EOMI; NC/AT:no facial pressure marks, no rashes  Physical findings otherwise essentially unchanged from previous  IMPRESSION: Primary Sleep/Secondary(to Medical or Psych conditions) & comorbidities   1  Obstructive sleep apnea  PAP DME Pressure Change   2  Other insomnia     3  RLS (restless legs syndrome)     4  Daytime sleepiness     5  Paroxysmal atrial fibrillation (HCC)     6  Seasonal allergic rhinitis due to pollen     7  Asthma due to seasonal allergies     8  Chronic fatigue     9  Morbid obesity with BMI of 40 0-44 9, adult ([facility] [address] )     10  Gastroesophageal reflux disease without esophagitis       PLAN:  1  I reviewed results of prior studies and physiologic data with the patient   I discussed treatment options with risks and benefits  She is not a candidate for mandibular advancement device and does not want surgery  2  Treatment with  PAP is medically necessary and Katarina Smart is agreable to continue use  I advised an in-lab titration study but she declined because of insurance constraints  She understands limitations of auto titrating Pap   3  Care of equipment, methods to improve comfort using PAP and importance of compliance with therapy were discussed  She met with DME provider for refitting of her interface  4  I had sent a prescription for desensitization but it appears this was not implemented  I explained the principal and procedure to her  Pressure setting: change desensitization 5-12 cmH2O (or best tolerated) in increments of 1 cm every 2 days  5  Rx provided to replace supplies and Care coordinated with DME provider  6  Cognitive behavioral therapy was initiated, Sleep Hygiene and behavioral techniques to manage Insomnia were discussed  Specifically, avoiding daytime naps, avoiding caffeine use after 2-3 p m , keeping a regular sleep routine, limiting time in bed to 6 hours and on relaxation techniques  7  For her restless leg symptoms I advised non pharmacologic measures  If symptoms escalate, she may warrant medication such as gabapentin  This may also assist with radicular symptoms, sleep and anxiety  8  She may also warrant reviewing of her psychiatric medications and will follow up with her PCP in this regard  9  Discussed  strategies for weight reduction and ensuring adequate treatment of her allergies and asthma  10  Follow-up is advised in 1 month to monitor progress, compliance and to adjust therapy  Thank you for allowing me to participate in the care of this patient      Sincerely,    Authenticated electronically by Ariel Arreaga MD on 32/70/80   Board Certified Specialist

## 2021-02-05 NOTE — PATIENT INSTRUCTIONS

## 2021-02-05 NOTE — PROGRESS NOTES
Review of Systems      Genitourinary need to urinate more than twice a night   Cardiology none   Gastrointestinal frequent heartburn/acid reflux   Neurology numbness/tingling of an extremity, forgetfulness and poor concentration or confusion,    Constitutional fatigue   Integumentary rash or dry skin and itching   Psychiatry anxiety and depression   Musculoskeletal joint pain, back pain and legs twitching/jerking   Pulmonary shortness of breath with activity and snoring   ENT none   Endocrine none   Hematological none

## 2021-02-05 NOTE — TELEPHONE ENCOUNTER
Received Rx to redo the desensitization starting at 5cm and going up to 12cm by increasing every 2 days  I called the pt  and advised her I changed it to 5cm today

## 2021-02-11 ENCOUNTER — TELEPHONE (OUTPATIENT)
Dept: FAMILY MEDICINE CLINIC | Facility: CLINIC | Age: 61
End: 2021-02-11

## 2021-02-11 ENCOUNTER — OFFICE VISIT (OUTPATIENT)
Dept: FAMILY MEDICINE CLINIC | Facility: CLINIC | Age: 61
End: 2021-02-11
Payer: COMMERCIAL

## 2021-02-11 VITALS
TEMPERATURE: 97.6 F | WEIGHT: 236 LBS | HEIGHT: 65 IN | DIASTOLIC BLOOD PRESSURE: 98 MMHG | SYSTOLIC BLOOD PRESSURE: 153 MMHG | BODY MASS INDEX: 39.32 KG/M2

## 2021-02-11 DIAGNOSIS — R53.82 CHRONIC FATIGUE: ICD-10-CM

## 2021-02-11 DIAGNOSIS — F41.9 ANXIETY: ICD-10-CM

## 2021-02-11 DIAGNOSIS — I10 BENIGN ESSENTIAL HYPERTENSION: ICD-10-CM

## 2021-02-11 DIAGNOSIS — I48.0 PAROXYSMAL ATRIAL FIBRILLATION (HCC): Primary | ICD-10-CM

## 2021-02-11 DIAGNOSIS — N30.10 INTERSTITIAL CYSTITIS: ICD-10-CM

## 2021-02-11 DIAGNOSIS — F32.A ANXIETY AND DEPRESSION: ICD-10-CM

## 2021-02-11 DIAGNOSIS — F41.9 ANXIETY AND DEPRESSION: ICD-10-CM

## 2021-02-11 DIAGNOSIS — R06.02 SHORTNESS OF BREATH: ICD-10-CM

## 2021-02-11 PROCEDURE — 3725F SCREEN DEPRESSION PERFORMED: CPT

## 2021-02-11 PROCEDURE — 99214 OFFICE O/P EST MOD 30 MIN: CPT

## 2021-02-11 PROCEDURE — 96372 THER/PROPH/DIAG INJ SC/IM: CPT

## 2021-02-11 RX ORDER — LORAZEPAM 0.5 MG/1
0.5 TABLET ORAL EVERY 8 HOURS PRN
Qty: 90 TABLET | Refills: 0 | Status: SHIPPED | OUTPATIENT
Start: 2021-02-11 | End: 2021-03-11 | Stop reason: SDUPTHER

## 2021-02-11 RX ORDER — AMLODIPINE BESYLATE 2.5 MG/1
2.5 TABLET ORAL DAILY
Qty: 90 TABLET | Refills: 3 | Status: SHIPPED | OUTPATIENT
Start: 2021-02-11 | End: 2021-05-24

## 2021-02-11 RX ORDER — CYANOCOBALAMIN 1000 UG/ML
1000 INJECTION INTRAMUSCULAR; SUBCUTANEOUS
Status: CANCELLED | OUTPATIENT
Start: 2021-02-11

## 2021-02-11 RX ORDER — PHENAZOPYRIDINE HYDROCHLORIDE 200 MG/1
200 TABLET, FILM COATED ORAL
Qty: 30 TABLET | Refills: 1 | Status: SHIPPED | OUTPATIENT
Start: 2021-02-11 | End: 2021-04-25

## 2021-02-11 NOTE — TELEPHONE ENCOUNTER
Spoke with patient, she states this liquid is effective to calm things right down, whereas tablet form took hours to do same

## 2021-02-11 NOTE — TELEPHONE ENCOUNTER
Patient called to say that carafate soln ordered in January recently needs prior authorization  Please advise  Thank you

## 2021-02-11 NOTE — PROGRESS NOTES
Assessment/Plan: patient will start amlodipine 2 5 mg daily for hypertension  Patient is only side effect in the past was edema  Guidance given overall  Patient continue with other medications follow-up cardiology appropriately  Patient will be referred to pulmonology for shortness of breath  The patient will have vitamin B12 shot at this time  Patient will start vitamin B12 OTC 1000 mcg daily  Patient have Ativan increased to 3 times a day due to increased anxiety  Diagnoses and all orders for this visit:    Paroxysmal atrial fibrillation (HCC)  -     amLODIPine (NORVASC) 2 5 mg tablet; Take 1 tablet (2 5 mg total) by mouth daily    Interstitial cystitis  -     phenazopyridine (Pyridium) 200 mg tablet; Take 1 tablet (200 mg total) by mouth 3 (three) times a day with meals    Benign essential hypertension  -     amLODIPine (NORVASC) 2 5 mg tablet; Take 1 tablet (2 5 mg total) by mouth daily    Shortness of breath  -     Ambulatory referral to Pulmonology; Future    Anxiety    Anxiety and depression  -     LORazepam (ATIVAN) 0 5 mg tablet; Take 1 tablet (0 5 mg total) by mouth every 8 (eight) hours as needed for anxiety            Subjective:        Patient ID: Suleman Delgado is a 56 y o  female  Patient follow-up on hypertension as well as Afib  Patient would like vitamin B12 injection  Patient is still short of breath and chest pain /palpable patient did see Cardiology and spironolactone was increased  Patient with increased anxiety  Patient with shortness of breath and some chest pain palpitations  The following portions of the patient's history were reviewed and updated as appropriate: allergies, current medications, past family history, past medical history, past social history, past surgical history and problem list       Review of Systems   Constitutional: Negative  HENT: Negative  Eyes: Negative  Respiratory: Positive for shortness of breath      Cardiovascular: Positive for chest pain and palpitations  Gastrointestinal: Negative  Endocrine: Negative  Genitourinary: Negative  Musculoskeletal: Negative  Skin: Negative  Allergic/Immunologic: Negative  Neurological: Negative  Hematological: Negative  Psychiatric/Behavioral: The patient is nervous/anxious  Objective:      BMI Counseling: Body mass index is 39 27 kg/m²  The BMI is above normal  Nutrition recommendations include decreasing portion sizes  Exercise recommendations include moderate physical activity 150 minutes/week  /98 (BP Location: Left arm, Patient Position: Sitting, Cuff Size: Large)   Temp 97 6 °F (36 4 °C) (Tympanic)   Ht 5' 5" (1 651 m)   Wt 107 kg (236 lb)   LMP  (LMP Unknown)   BMI 39 27 kg/m²          Physical Exam  Vitals signs and nursing note reviewed  Constitutional:       General: She is not in acute distress  Appearance: Normal appearance  She is not ill-appearing, toxic-appearing or diaphoretic  HENT:      Head: Normocephalic and atraumatic  Right Ear: Tympanic membrane, ear canal and external ear normal  There is no impacted cerumen  Left Ear: Tympanic membrane, ear canal and external ear normal  There is no impacted cerumen  Nose: Nose normal  No congestion or rhinorrhea  Mouth/Throat:      Mouth: Mucous membranes are moist       Pharynx: No oropharyngeal exudate or posterior oropharyngeal erythema  Eyes:      General: No scleral icterus  Right eye: No discharge  Left eye: No discharge  Extraocular Movements: Extraocular movements intact  Conjunctiva/sclera: Conjunctivae normal       Pupils: Pupils are equal, round, and reactive to light  Neck:      Musculoskeletal: Normal range of motion and neck supple  No neck rigidity or muscular tenderness  Vascular: No carotid bruit  Cardiovascular:      Rate and Rhythm: Normal rate  Rhythm irregular  Pulses: Normal pulses        Heart sounds: Normal heart sounds  No murmur  No friction rub  No gallop  Pulmonary:      Effort: Pulmonary effort is normal  No respiratory distress  Breath sounds: Normal breath sounds  No stridor  No wheezing, rhonchi or rales  Chest:      Chest wall: No tenderness  Abdominal:      General: Abdomen is flat  Bowel sounds are normal  There is no distension  Palpations: Abdomen is soft  Tenderness: There is no abdominal tenderness  There is no guarding or rebound  Musculoskeletal: Normal range of motion  General: No swelling, tenderness, deformity or signs of injury  Right lower leg: No edema  Left lower leg: No edema  Lymphadenopathy:      Cervical: No cervical adenopathy  Skin:     General: Skin is warm and dry  Capillary Refill: Capillary refill takes less than 2 seconds  Coloration: Skin is not jaundiced  Findings: No bruising, erythema, lesion or rash  Neurological:      General: No focal deficit present  Mental Status: She is alert and oriented to person, place, and time  Cranial Nerves: No cranial nerve deficit  Sensory: No sensory deficit  Motor: No weakness  Coordination: Coordination normal       Gait: Gait normal    Psychiatric:         Behavior: Behavior normal          Thought Content:  Thought content normal          Judgment: Judgment normal

## 2021-02-11 NOTE — TELEPHONE ENCOUNTER
Call patient to see if she feels the Carafate is helping    If it is helping significantly try to prior authorize as patient has failed multiple proton pump inhibitors

## 2021-02-12 RX ORDER — CYANOCOBALAMIN 1000 UG/ML
1000 INJECTION INTRAMUSCULAR; SUBCUTANEOUS
Status: SHIPPED | OUTPATIENT
Start: 2021-02-12

## 2021-02-15 RX ADMIN — CYANOCOBALAMIN 1000 MCG: 1000 INJECTION INTRAMUSCULAR; SUBCUTANEOUS at 11:32

## 2021-02-16 ENCOUNTER — TELEPHONE (OUTPATIENT)
Dept: FAMILY MEDICINE CLINIC | Facility: CLINIC | Age: 61
End: 2021-02-16

## 2021-02-16 DIAGNOSIS — R06.02 SHORTNESS OF BREATH: Primary | ICD-10-CM

## 2021-02-16 DIAGNOSIS — I10 BENIGN ESSENTIAL HYPERTENSION: ICD-10-CM

## 2021-02-16 DIAGNOSIS — R06.2 WHEEZING: ICD-10-CM

## 2021-02-16 NOTE — TELEPHONE ENCOUNTER
PATIENT CALLED SHE HAS AN APPT WITH DR Penny Mukherjee ON 3/04/21, DR Penny Mukherjee OFFICE IS ASKING THE DR MONTANEZ TO GIVE A RX FOR PATIENT TO GO FOR A CHEST XRAY

## 2021-02-25 ENCOUNTER — TELEPHONE (OUTPATIENT)
Dept: FAMILY MEDICINE CLINIC | Facility: CLINIC | Age: 61
End: 2021-02-25

## 2021-02-25 NOTE — TELEPHONE ENCOUNTER
Patient called in stating she was prescribed amlodipine 2 5mg and she is stating her blood pressure is still in 150's and she is wanting to know if she should up the dose  Patient is not having any swelling  Please review and advise  Thank you

## 2021-03-01 DIAGNOSIS — J06.9 ACUTE UPPER RESPIRATORY INFECTION: ICD-10-CM

## 2021-03-01 DIAGNOSIS — R06.2 WHEEZING: ICD-10-CM

## 2021-03-01 RX ORDER — FLUTICASONE FUROATE AND VILANTEROL 200; 25 UG/1; UG/1
1 POWDER RESPIRATORY (INHALATION) DAILY
Qty: 3 INHALER | Refills: 3 | Status: SHIPPED | OUTPATIENT
Start: 2021-03-01

## 2021-03-01 NOTE — TELEPHONE ENCOUNTER
Patient needs refill of Breo inhaler  She needs count of 3 ordered at a time  Patient was seen on 2/11/21  Have placed order

## 2021-03-04 ENCOUNTER — TELEPHONE (OUTPATIENT)
Dept: SLEEP CENTER | Facility: CLINIC | Age: 61
End: 2021-03-04

## 2021-03-11 ENCOUNTER — RA CDI HCC (OUTPATIENT)
Dept: OTHER | Facility: HOSPITAL | Age: 61
End: 2021-03-11

## 2021-03-11 DIAGNOSIS — F41.9 ANXIETY AND DEPRESSION: ICD-10-CM

## 2021-03-11 DIAGNOSIS — F32.A ANXIETY AND DEPRESSION: ICD-10-CM

## 2021-03-11 RX ORDER — LORAZEPAM 0.5 MG/1
0.5 TABLET ORAL EVERY 8 HOURS PRN
Qty: 90 TABLET | Refills: 5 | Status: SHIPPED | OUTPATIENT
Start: 2021-03-11 | End: 2021-08-27 | Stop reason: SDUPTHER

## 2021-03-11 NOTE — PROGRESS NOTES
[place] [address]  coding oppertunities             Chart reviewed, (number of) suggestions sent to provider: 2                 H15 17: Diastolic congestive heart failure, unspecified HF chronicity ([place] [address] )   I11 0: Hypertensive heart disease with congestive heart failure ([place] [address] )     [place] [address]  coding oppertunities             Chart reviewed, (number of) suggestions sent to provider: 2     Problem listed updated   Provider Accepted, (number of) suggestions accepted: 2

## 2021-03-12 ENCOUNTER — OFFICE VISIT (OUTPATIENT)
Dept: SLEEP CENTER | Facility: CLINIC | Age: 61
End: 2021-03-12
Payer: COMMERCIAL

## 2021-03-12 ENCOUNTER — TELEPHONE (OUTPATIENT)
Dept: SLEEP CENTER | Facility: CLINIC | Age: 61
End: 2021-03-12

## 2021-03-12 VITALS
SYSTOLIC BLOOD PRESSURE: 122 MMHG | WEIGHT: 231 LBS | DIASTOLIC BLOOD PRESSURE: 62 MMHG | BODY MASS INDEX: 39.44 KG/M2 | HEIGHT: 64 IN

## 2021-03-12 DIAGNOSIS — G25.81 RLS (RESTLESS LEGS SYNDROME): ICD-10-CM

## 2021-03-12 DIAGNOSIS — R53.82 CHRONIC FATIGUE: ICD-10-CM

## 2021-03-12 DIAGNOSIS — E66.9 OBESITY (BMI 30-39.9): ICD-10-CM

## 2021-03-12 DIAGNOSIS — G47.09 OTHER INSOMNIA: ICD-10-CM

## 2021-03-12 DIAGNOSIS — R06.00 DOE (DYSPNEA ON EXERTION): ICD-10-CM

## 2021-03-12 DIAGNOSIS — Z01.812 ENCOUNTER FOR PREPROCEDURE SCREENING LABORATORY TESTING FOR COVID-19: Primary | ICD-10-CM

## 2021-03-12 DIAGNOSIS — R68.2 DRY MOUTH: ICD-10-CM

## 2021-03-12 DIAGNOSIS — I48.0 PAROXYSMAL ATRIAL FIBRILLATION (HCC): ICD-10-CM

## 2021-03-12 DIAGNOSIS — R40.0 DAYTIME SLEEPINESS: ICD-10-CM

## 2021-03-12 DIAGNOSIS — R00.2 PALPITATION: ICD-10-CM

## 2021-03-12 DIAGNOSIS — Z20.822 ENCOUNTER FOR PREPROCEDURE SCREENING LABORATORY TESTING FOR COVID-19: Primary | ICD-10-CM

## 2021-03-12 DIAGNOSIS — J30.1 SEASONAL ALLERGIC RHINITIS DUE TO POLLEN: ICD-10-CM

## 2021-03-12 DIAGNOSIS — G47.33 OBSTRUCTIVE SLEEP APNEA: Primary | ICD-10-CM

## 2021-03-12 DIAGNOSIS — J45.909 ASTHMA DUE TO SEASONAL ALLERGIES: ICD-10-CM

## 2021-03-12 DIAGNOSIS — K21.9 GASTROESOPHAGEAL REFLUX DISEASE WITHOUT ESOPHAGITIS: ICD-10-CM

## 2021-03-12 PROBLEM — I50.30 UNSPECIFIED DIASTOLIC (CONGESTIVE) HEART FAILURE (HCC): Status: ACTIVE | Noted: 2021-03-12

## 2021-03-12 PROCEDURE — 99214 OFFICE O/P EST MOD 30 MIN: CPT | Performed by: INTERNAL MEDICINE

## 2021-03-12 NOTE — PROGRESS NOTES
Review of Systems      Genitourinary need to urinate more than twice a night and hot flashes at night   Cardiology palpitations/fluttering feeling in the chest and ankle/leg swelling   Gastrointestinal frequent heartburn/acid reflux   Neurology frequent headaches, awaken with headache, muscle weakness, forgetfulness and poor concentration or confusion,    Constitutional fatigue and weight change   Integumentary rash or dry skin and itching   Psychiatry anxiety   Musculoskeletal joint pain, muscle aches, back pain and legs twitching/jerking   Pulmonary shortness of breath with activity, chest tightness, wheezing and difficulty breathing when lying flat    ENT none   Endocrine excessive thirst and frequent urination   Hematological none

## 2021-03-12 NOTE — PROGRESS NOTES
Follow-Up Note - [facility]   Suzi Rainey  59 y o  female  JSF:[id number]  ZBP:[id number]    CC: I saw this patient for follow-up in clinic today for Sleep disordered breathing, Coexisting Sleep and Medical Problems  She has been having increasing difficulty with breathing and palpitations for which she is due to see pulmonary and cardiology  Home sleep testing in December of 2020 demonstrated: DENZEL (respiratory event index of) 14 /hour  The oxygen desaturation index was 15 5 per hour  Minimum oxygen saturation 67%  and 12 9% of total sleep time was spent with saturations less than 90%  The snore index was 39 1%  Auto titrating Pap was initiated  PFSH, Problem List, Medications & Allergies were reviewed in EMR  Interval changes: none reported  She  has a past medical history of A-fib ([facility] [address] ) (03/2020), Arthritis, Asthma, Colon polyp, GERD (gastroesophageal reflux disease), Heart murmur, Hives, Hyperlipidemia, Hypertension, Infertility, female, Migraine, Palpitations, Psychiatric disorder, and Wears glasses  She has a current medication list which includes the following prescription(s): acetaminophen, amlodipine, apixaban, azelastine, bupropion, carafate, chlorhexidine, dexilant, diclofenac sodium, elderberry, epinastine hcl, evolocumab, fluoxetine, fluticasone, fluticasone-salmeterol, fluticasone-vilanterol, furosemide, hydrocortisone-pramoxine, lorazepam, metoprolol succinate, montelukast, multiple vitamin, omeprazole, phenazopyridine, spironolactone, terconazole, triamcinolone, zinc, cetirizine, econazole nitrate, fexofenadine, and naloxone, and the following Facility-Administered Medications: cyanocobalamin  ROS: as attached  Significant for weight has been stable  She is on treatment for her nasal allergies  She is on medication for anxiety  PHYSIOLOGICAL DATA REVIEW AND INTERPRETATION:   using PAP > 4 hours/night 50%  Estimated DENZEL 2 2/hour with pressure of 12cm H2O    She had difficulty tolerating auto titrating Pap and a desensitization program was initiated  She continues to report episodes of awakening with gasping and "feels like the machine is sucking the out of me "  Compliance: fair; Sleep disordered breathing:stable & within target range    SUBJECTIVE: Regarding use of PAP, Bev Winslow reports:   · She is experiencing some adverse effects: dry mouth/throat, nasal congestion and pressure too high  · She is benefiting from use: more rested  and more mental clarity  · Restless leg symptoms occur almost nightly and delay sleep  Sleep Routine: She reports getting 6 hrs sleep  ; she difficulty initiating sleep, but not maintaining sleep   She awakens spontaneously and feels more refreshed since on Rx  Bev Winslow reports somewhat improved Excessive Daytime Sleepiness feels like napping but avoids and rated herself at Total score: 5 /24 on the [employer] Sleepiness Scale  Habits: reports that she quit smoking about 2 years ago  She has a 5 00 pack-year smoking history  She has never used smokeless tobacco ,  reports no history of alcohol use ,  reports no history of drug use , Caffeine use: excessive until around 6:00 p m , Exercise routine: none   EXAM: /62   Ht 5' 4" (1 626 m)   Wt 105 kg (231 lb)   LMP  (LMP Unknown)   BMI 39 65 kg/m²     Patient is well groomed; well appearing  Skin/Extrem: col & hydration normal; no edema  Psych: cooperativeand in no distress  Mental state:appears  Anxious and constricted affect  CNS: Alert, orientated, clear & coherent speech  H&N: EOMI; NC/AT:no facial pressure marks, no rashes  Physical findings otherwise essentially unchanged from previous  IMPRESSION: Problem List Items & Comorbidities Addressed this Visit    1  Obstructive sleep apnea     2  Other insomnia     3  RLS (restless legs syndrome)     4  Daytime sleepiness     5  Dry mouth     6  Paroxysmal atrial fibrillation (HCC)     7   Seasonal allergic rhinitis due to pollen 8  Asthma due to seasonal allergies     9  Chronic fatigue     10  Obesity (BMI 30-39 9)     11  Gastroesophageal reflux disease without esophagitis     12  [place] (dyspnea on exertion)     13  Palpitations and chest pain  PLAN:  1  I reviewed results of prior studies and physiologic data with the patient  2  I discussed treatment options with risks and benefits  Jailyn Licona is agreable to continue use  Her average use per night is just under 4 hours and not withstanding, she has benefiting from use and it is medically necessary  4  Care of equipment, methods to improve comfort using PAP and importance of compliance with therapy were discussed  5  Pressure setting: continue 12 cmH2O for now since she is having difficulty tolerating the pressure at times  In view of her ongoing symptoms and significant oxygen desaturations an in-lab titration study is warranted  6  Rx provided to replace supplies and Care coordinated with DME provider  7  Cognitive behavioral therapy was initiated, Sleep Hygiene and behavioral techniques to manage Insomnia were discussed  Specifically, avoiding caffeine use after 2:00 p m , starting an exercise routine, limiting time in bed to 7 hours and on relaxation techniques  8  She declined medication for restless leg symptoms because I am already taking some any medications   She is using non pharmacologic measures and may continue  9  She is due to see Cardiology and pulmonology  10  Discussed  strategies for weight reduction  11  Follow-up is advised in 1 year or sooner if needed to monitor progress, compliance and to adjust therapy  Thank you for allowing me to participate in the care of this patient      Sincerely,    Authenticated electronically by Amisha Driver MD on 20/36/66   Board Certified Specialist

## 2021-03-12 NOTE — TELEPHONE ENCOUNTER
Patient informed that COVID testing is to be performed 5-10 days prior to PAP study  COVID tests performed more than 10 days prior to the sleep study will not be accepted  Testing site information provided as well as letter with testing dates

## 2021-03-18 ENCOUNTER — OFFICE VISIT (OUTPATIENT)
Dept: FAMILY MEDICINE CLINIC | Facility: CLINIC | Age: 61
End: 2021-03-18
Payer: COMMERCIAL

## 2021-03-18 ENCOUNTER — TELEPHONE (OUTPATIENT)
Dept: FAMILY MEDICINE CLINIC | Facility: CLINIC | Age: 61
End: 2021-03-18

## 2021-03-18 VITALS
DIASTOLIC BLOOD PRESSURE: 92 MMHG | WEIGHT: 233 LBS | BODY MASS INDEX: 39.78 KG/M2 | SYSTOLIC BLOOD PRESSURE: 142 MMHG | HEIGHT: 64 IN

## 2021-03-18 DIAGNOSIS — I10 ESSENTIAL HYPERTENSION: Primary | ICD-10-CM

## 2021-03-18 DIAGNOSIS — E53.8 VITAMIN B12 DEFICIENCY: ICD-10-CM

## 2021-03-18 DIAGNOSIS — I10 BENIGN ESSENTIAL HYPERTENSION: ICD-10-CM

## 2021-03-18 DIAGNOSIS — I48.0 PAROXYSMAL ATRIAL FIBRILLATION (HCC): ICD-10-CM

## 2021-03-18 PROCEDURE — 3080F DIAST BP >= 90 MM HG: CPT | Performed by: FAMILY MEDICINE

## 2021-03-18 PROCEDURE — 99214 OFFICE O/P EST MOD 30 MIN: CPT | Performed by: FAMILY MEDICINE

## 2021-03-18 PROCEDURE — 3077F SYST BP >= 140 MM HG: CPT | Performed by: FAMILY MEDICINE

## 2021-03-18 PROCEDURE — 3008F BODY MASS INDEX DOCD: CPT | Performed by: FAMILY MEDICINE

## 2021-03-18 PROCEDURE — 1036F TOBACCO NON-USER: CPT | Performed by: FAMILY MEDICINE

## 2021-03-18 RX ORDER — AMLODIPINE BESYLATE 5 MG/1
5 TABLET ORAL DAILY
Qty: 90 TABLET | Refills: 0 | Status: SHIPPED | OUTPATIENT
Start: 2021-03-18 | End: 2021-05-24

## 2021-03-18 RX ADMIN — CYANOCOBALAMIN 1000 MCG: 1000 INJECTION INTRAMUSCULAR; SUBCUTANEOUS at 11:47

## 2021-03-18 NOTE — TELEPHONE ENCOUNTER
Patient was here to see Dr Santosh Marc  Patient had been refused carafate suspension by [employer] and needs letter of medical necessity indicating an urgency for filling order  She has failed trials on omeprazole 20 mg, Protonix, Prevacid 15 mg, carafate tablets, and Dexilant  Fax number patient left is [phone]

## 2021-03-18 NOTE — PROGRESS NOTES
Assessment/Plan:  Vitamin B12 injection given at this time  Patient with loop recorder on  Patient follow-up with Cardiology and pulmonology appropriately  Other refills will be given when needed  Letter will be done for patient to try to get Carafate as she has failed omeprazole, Protonix, Prevacid, Carafate tablets and excellent  Patient go for laboratory studies  Diagnoses and all orders for this visit:    Essential hypertension  -     Comprehensive metabolic panel; Future  -     CBC and differential; Future  -     Lipid panel; Future    Paroxysmal atrial fibrillation (HCC)  -     Comprehensive metabolic panel; Future  -     CBC and differential; Future  -     Lipid panel; Future    Benign essential hypertension  -     amLODIPine (NORVASC) 5 mg tablet; Take 1 tablet (5 mg total) by mouth daily  -     Comprehensive metabolic panel; Future  -     CBC and differential; Future  -     Lipid panel; Future    Vitamin B12 deficiency            Subjective:        Patient ID: Fabiana Arambula is a 59 y o  female  Patient here to follow-up on AFib as well as hypertension and vitamin B12 deficiency  Patient is seeing new cardiologist   Patient does have Zio in place  Patient is seeing pulmonology at the end of March  The following portions of the patient's history were reviewed and updated as appropriate: allergies, current medications, past family history, past medical history, past social history, past surgical history and problem list       Review of Systems   Constitutional: Negative  HENT: Negative  Eyes: Negative  Respiratory: Positive for shortness of breath  Cardiovascular: Negative  Gastrointestinal: Negative  Endocrine: Negative  Genitourinary: Negative  Musculoskeletal: Negative  Skin: Negative  Allergic/Immunologic: Negative  Neurological: Negative  Hematological: Negative  Psychiatric/Behavioral: Negative              Objective:               /92 (BP Location: Right arm, Patient Position: Sitting, Cuff Size: Standard)   Ht 5' 4" (1 626 m)   Wt 106 kg (233 lb)   LMP  (LMP Unknown)   BMI 39 99 kg/m²          Physical Exam  Constitutional:       General: She is not in acute distress  Appearance: Normal appearance  She is not ill-appearing, toxic-appearing or diaphoretic  HENT:      Head: Normocephalic and atraumatic  Right Ear: External ear normal       Left Ear: External ear normal       Nose: Nose normal    Cardiovascular:      Rate and Rhythm: Normal rate and regular rhythm  Pulses: Normal pulses  Heart sounds: Normal heart sounds  Pulmonary:      Effort: Pulmonary effort is normal       Breath sounds: Normal breath sounds  Musculoskeletal:      Right lower leg: No edema  Left lower leg: No edema  Skin:     Capillary Refill: Capillary refill takes less than 2 seconds  Neurological:      Mental Status: She is alert  Mental status is at baseline  Psychiatric:         Mood and Affect: Mood normal          Behavior: Behavior normal          Thought Content:  Thought content normal

## 2021-03-24 ENCOUNTER — TELEPHONE (OUTPATIENT)
Dept: SLEEP CENTER | Facility: CLINIC | Age: 61
End: 2021-03-24

## 2021-03-24 DIAGNOSIS — E78.5 HYPERLIPIDEMIA, UNSPECIFIED HYPERLIPIDEMIA TYPE: ICD-10-CM

## 2021-03-24 NOTE — TELEPHONE ENCOUNTER
Patient called, states she is having sleep study 4/27/2021 and feels she needs something to help her sleep  Dr August Hernandez, are you willing to order something? She would like it sent to [facility]

## 2021-03-29 ENCOUNTER — APPOINTMENT (OUTPATIENT)
Dept: RADIOLOGY | Facility: MEDICAL CENTER | Age: 61
End: 2021-03-29
Payer: COMMERCIAL

## 2021-03-29 DIAGNOSIS — R06.2 WHEEZING: ICD-10-CM

## 2021-03-29 DIAGNOSIS — R06.02 SHORTNESS OF BREATH: ICD-10-CM

## 2021-03-29 DIAGNOSIS — I10 BENIGN ESSENTIAL HYPERTENSION: ICD-10-CM

## 2021-03-29 PROCEDURE — 71046 X-RAY EXAM CHEST 2 VIEWS: CPT

## 2021-03-31 DIAGNOSIS — G47.09 OTHER INSOMNIA: Primary | ICD-10-CM

## 2021-03-31 RX ORDER — ZOLPIDEM TARTRATE 5 MG/1
5 TABLET ORAL AS NEEDED
Qty: 1 TABLET | Refills: 0 | Status: SHIPPED | OUTPATIENT
Start: 2021-03-31 | End: 2021-05-24

## 2021-03-31 NOTE — TELEPHONE ENCOUNTER
Yes, she would like a medication to help her sleep for the night of her sleep study  Please send to [facility]

## 2021-04-01 DIAGNOSIS — F41.9 ANXIETY AND DEPRESSION: ICD-10-CM

## 2021-04-01 DIAGNOSIS — F32.A ANXIETY AND DEPRESSION: ICD-10-CM

## 2021-04-01 DIAGNOSIS — I48.0 PAROXYSMAL ATRIAL FIBRILLATION (HCC): ICD-10-CM

## 2021-04-01 RX ORDER — FLUOXETINE 10 MG/1
CAPSULE ORAL
Qty: 90 CAPSULE | Refills: 0 | Status: SHIPPED | OUTPATIENT
Start: 2021-04-01 | End: 2021-05-24

## 2021-04-01 RX ORDER — APIXABAN 5 MG/1
TABLET, FILM COATED ORAL
Qty: 180 TABLET | Refills: 0 | Status: SHIPPED | OUTPATIENT
Start: 2021-04-01 | End: 2021-06-02

## 2021-04-01 NOTE — TELEPHONE ENCOUNTER
Dr William Loving sent script    Left message making patient aware script sent and to bring with her the night of the sleep study    Advised to call with any questions or concerns

## 2021-04-05 NOTE — TELEPHONE ENCOUNTER
Letter of Necessity was faxed twice to insurance plan - 02/28/2021 & 03/31/2021    Patient was approved for this medication on 04/01/2021, but the patients' insurance had not sent the approval letter as to date  The decision was over turned in our favor on 04/01/2021 and approved through 04/01/2022  Patients' pharmacy of choice place the medication through today 04/05/2021, authorization went through and they will inform patient when to pick-up medication

## 2021-04-08 DIAGNOSIS — F41.9 ANXIETY AND DEPRESSION: ICD-10-CM

## 2021-04-08 DIAGNOSIS — F32.A ANXIETY AND DEPRESSION: ICD-10-CM

## 2021-04-08 RX ORDER — LORAZEPAM 0.5 MG/1
0.5 TABLET ORAL EVERY 8 HOURS PRN
Qty: 90 TABLET | Refills: 0 | Status: CANCELLED | OUTPATIENT
Start: 2021-04-08 | End: 2021-05-08

## 2021-04-14 DIAGNOSIS — K21.9 GASTROESOPHAGEAL REFLUX DISEASE WITHOUT ESOPHAGITIS: ICD-10-CM

## 2021-04-14 RX ORDER — OMEPRAZOLE 20 MG/1
CAPSULE, DELAYED RELEASE ORAL
Qty: 90 CAPSULE | Refills: 0 | Status: SHIPPED | OUTPATIENT
Start: 2021-04-14 | End: 2021-07-02 | Stop reason: SDUPTHER

## 2021-04-19 ENCOUNTER — TELEPHONE (OUTPATIENT)
Dept: FAMILY MEDICINE CLINIC | Facility: CLINIC | Age: 61
End: 2021-04-19

## 2021-04-19 DIAGNOSIS — M25.50 ARTHRALGIA, UNSPECIFIED JOINT: Primary | ICD-10-CM

## 2021-04-19 DIAGNOSIS — Z20.822 ENCOUNTER FOR PREPROCEDURE SCREENING LABORATORY TESTING FOR COVID-19: ICD-10-CM

## 2021-04-19 DIAGNOSIS — Z01.812 ENCOUNTER FOR PREPROCEDURE SCREENING LABORATORY TESTING FOR COVID-19: ICD-10-CM
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
## 2021-04-19 DIAGNOSIS — S80.869A TICK BITE OF LOWER LEG, UNSPECIFIED LATERALITY, INITIAL ENCOUNTER: ICD-10-CM

## 2021-04-19 PROCEDURE — U0003 INFECTIOUS AGENT DETECTION BY NUCLEIC ACID (DNA OR RNA); SEVERE ACUTE RESPIRATORY SYNDROME CORONAVIRUS 2 (SARS-COV-2) (CORONAVIRUS DISEASE [COVID-19]), AMPLIFIED PROBE TECHNIQUE, MAKING USE OF HIGH THROUGHPUT TECHNOLOGIES AS DESCRIBED BY CMS-2020-01-R: HCPCS | Performed by: INTERNAL MEDICINE

## 2021-04-19 PROCEDURE — U0005 INFEC AGEN DETEC AMPLI PROBE: HCPCS | Performed by: INTERNAL MEDICINE

## 2021-04-19 NOTE — TELEPHONE ENCOUNTER
Pt had an attached tick about 3-4 wks ago and now is experiencing pain in her joints and muscles and also extreme fatigue  She does not report a bullseye rash  She is asking for a blood test for Lyme    Please advise  Thanks

## 2021-04-20 LAB — SARS-COV-2 RNA RESP QL NAA+PROBE: NEGATIVE

## 2021-04-21 LAB
ALBUMIN SERPL-MCNC: 4.5 G/DL (ref 3.6–5.1)
ALBUMIN/GLOB SERPL: 1.9 (CALC) (ref 1–2.5)
ALP SERPL-CCNC: 109 U/L (ref 37–153)
ALT SERPL-CCNC: 48 U/L (ref 6–29)
AST SERPL-CCNC: 41 U/L (ref 10–35)
B BURGDOR AB SER IA-ACNC: <0.9 INDEX
BASOPHILS # BLD AUTO: 52 CELLS/UL (ref 0–200)
BASOPHILS NFR BLD AUTO: 0.7 %
BILIRUB SERPL-MCNC: 0.6 MG/DL (ref 0.2–1.2)
BUN SERPL-MCNC: 17 MG/DL (ref 7–25)
BUN/CREAT SERPL: ABNORMAL (CALC) (ref 6–22)
CALCIUM SERPL-MCNC: 9.7 MG/DL (ref 8.6–10.4)
CHLORIDE SERPL-SCNC: 99 MMOL/L (ref 98–110)
CHOLEST SERPL-MCNC: 149 MG/DL
CHOLEST/HDLC SERPL: 2.7 (CALC)
CO2 SERPL-SCNC: 29 MMOL/L (ref 20–32)
CREAT SERPL-MCNC: 0.93 MG/DL (ref 0.5–0.99)
EOSINOPHIL # BLD AUTO: 67 CELLS/UL (ref 15–500)
EOSINOPHIL NFR BLD AUTO: 0.9 %
ERYTHROCYTE [DISTWIDTH] IN BLOOD BY AUTOMATED COUNT: 14.2 % (ref 11–15)
GLOBULIN SER CALC-MCNC: 2.4 G/DL (CALC) (ref 1.9–3.7)
GLUCOSE SERPL-MCNC: 132 MG/DL (ref 65–99)
HCT VFR BLD AUTO: 36.4 % (ref 35–45)
HDLC SERPL-MCNC: 55 MG/DL
HGB BLD-MCNC: 12 G/DL (ref 11.7–15.5)
LDLC SERPL CALC-MCNC: 68 MG/DL (CALC)
LYMPHOCYTES # BLD AUTO: 1680 CELLS/UL (ref 850–3900)
LYMPHOCYTES NFR BLD AUTO: 22.7 %
MCH RBC QN AUTO: 28.2 PG (ref 27–33)
MCHC RBC AUTO-ENTMCNC: 33 G/DL (ref 32–36)
MCV RBC AUTO: 85.4 FL (ref 80–100)
MONOCYTES # BLD AUTO: 518 CELLS/UL (ref 200–950)
MONOCYTES NFR BLD AUTO: 7 %
NEUTROPHILS # BLD AUTO: 5084 CELLS/UL (ref 1500–7800)
NEUTROPHILS NFR BLD AUTO: 68.7 %
NONHDLC SERPL-MCNC: 94 MG/DL (CALC)
PLATELET # BLD AUTO: 309 THOUSAND/UL (ref 140–400)
PMV BLD REES-ECKER: 9.7 FL (ref 7.5–12.5)
POTASSIUM SERPL-SCNC: 3.5 MMOL/L (ref 3.5–5.3)
PROT SERPL-MCNC: 6.9 G/DL (ref 6.1–8.1)
RBC # BLD AUTO: 4.26 MILLION/UL (ref 3.8–5.1)
SL AMB EGFR AFRICAN AMERICAN: 76 ML/MIN/1.73M2
SL AMB EGFR NON AFRICAN AMERICAN: 65 ML/MIN/1.73M2
SODIUM SERPL-SCNC: 139 MMOL/L (ref 135–146)
TRIGL SERPL-MCNC: 193 MG/DL
WBC # BLD AUTO: 7.4 THOUSAND/UL (ref 3.8–10.8)

## 2021-04-25 DIAGNOSIS — N30.10 INTERSTITIAL CYSTITIS: ICD-10-CM

## 2021-04-25 RX ORDER — PHENAZOPYRIDINE HYDROCHLORIDE 200 MG/1
TABLET, FILM COATED ORAL
Qty: 30 TABLET | Refills: 0 | Status: SHIPPED | OUTPATIENT
Start: 2021-04-25 | End: 2021-05-24

## 2021-04-27 ENCOUNTER — HOSPITAL ENCOUNTER (OUTPATIENT)
Dept: SLEEP CENTER | Facility: CLINIC | Age: 61
Discharge: HOME | End: 2021-04-27
Payer: COMMERCIAL

## 2021-04-27 DIAGNOSIS — G47.33 OBSTRUCTIVE SLEEP APNEA: ICD-10-CM

## 2021-04-27 DIAGNOSIS — G47.09 OTHER INSOMNIA: ICD-10-CM

## 2021-04-27 DIAGNOSIS — G25.81 RLS (RESTLESS LEGS SYNDROME): ICD-10-CM

## 2021-04-27 PROCEDURE — 95811 POLYSOM 6/>YRS CPAP 4/> PARM: CPT

## 2021-04-28 DIAGNOSIS — G47.33 OBSTRUCTIVE SLEEP APNEA: Primary | ICD-10-CM

## 2021-04-28 NOTE — PROGRESS NOTES
Sleep Study Documentation    Pre-Sleep Study       Sleep testing procedure explained to patient:YES    Patient napped prior to study:NO    Caffeine:Dayshift worker after 12PM   Caffeine use:yes, Tea and Ice tea 12 to 36 ounces  Alcohol:Dayshift workers after 5PM: Alcohol use:NO    Typical day for patient:YES       Study Documentation    Sleep Study Indications: Pt had HST results DENZEL 14 with desats of 67%; currently on cpap +12 she said    Sleep Study: Treatment   Optimal PAP pressure: 8  Leak:Small  Snore:Eliminated  REM Obtained:yes  Supplemental O2: no    Minimum SaO2 84  Baseline SaO2 93  PAP mask tried (list all)ResMed Airtouch Small  PAP mask choice (final)same  PAP mask type:full face  PAP pressure at which snoring was eliminated 8  Minimum SaO2 at final PAP pressure 94  Mode of Therapy:CPAP  ETCO2:No  CPAP changed to BiPAP:No    Mode of Therapy:CPAP    EKG abnormalities: yes:  EPOCH example and comments: Atrial fib sin rhythm with supraventricular premature contractions    EEG abnormalities: no    Sleep Study Recorded < 2 hours: N/A    Sleep Study Recorded > 2 hours but incomplete study: N/A    Sleep Study Recorded 6 hours but no sleep obtained: NO          Post-Sleep Study    Medication used at bedtime or during sleep study:YES other prescription medications    Patient reports time it took to fall asleep:20 to 30 minutes    Patient reports waking up during study:1 to 2 times  Patient reports returning to sleep in greater than 30 minutes  Patient reports sleeping 4 to 6 hours without dreaming  Patient reports sleep during study:better than usual    Patient rated sleepiness: Not sleepy or tired    PAP treatment:yes: Post PAP treatment patient reports feeling better and  would wear PAP mask at home

## 2021-04-30 ENCOUNTER — TELEPHONE (OUTPATIENT)
Dept: SLEEP CENTER | Facility: CLINIC | Age: 61
End: 2021-04-30

## 2021-04-30 NOTE — TELEPHONE ENCOUNTER
Left message for the patient to call back for CPAP study results  Order for APAP 8-12  Not sure patient's current pressure   Order for her to be desensitized 12 cm or best tolerated

## 2021-04-30 NOTE — TELEPHONE ENCOUNTER
Received call from patient  She states her CPAP is currently set to 12cm  Advised CPAP study resulted and Dr Gil Graham ordered pressure change to machine  New order is for APAP 8-12cm  Advised patient that Rx will be sent to [facility] to be completed  It can take 24-48 hours to notice the change  Verbalized understanding  Adrian Mckinnon representative made aware of pressure change

## 2021-05-03 DIAGNOSIS — K29.00 ACUTE SUPERFICIAL GASTRITIS WITHOUT HEMORRHAGE: ICD-10-CM

## 2021-05-03 RX ORDER — SUCRALFATE 1 G/1
TABLET ORAL
Qty: 120 TABLET | Refills: 0 | OUTPATIENT
Start: 2021-05-03

## 2021-05-05 ENCOUNTER — TELEPHONE (OUTPATIENT)
Dept: SLEEP CENTER | Facility: CLINIC | Age: 61
End: 2021-05-05

## 2021-05-05 NOTE — TELEPHONE ENCOUNTER
Patient called, states she is having issues with her RLS, states the took the Ambien that she was supposed to take for her sleep study last night and it worked well, asking if Dr Laura Power would be willing to write an order  Patient states she did research and found relaxis pad that insurance will cover 90 %, asking if DR Laura Power will write script for that  I advised she has appointment 5/14/2021 and can discuss further at that time    Patient agreeable

## 2021-05-12 ENCOUNTER — RA CDI HCC (OUTPATIENT)
Dept: OTHER | Facility: HOSPITAL | Age: 61
End: 2021-05-12

## 2021-05-12 NOTE — PROGRESS NOTES
[facility]  coding opportunities             Chart reviewed, (number of) suggestions sent to provider: 2     Problem listed updated  Provider Accepted, (number of) suggestions accepted: 2        Patients insurance company: [employer] (Medicare Advantage and Commercial)     Visit status: Patient arrived for their scheduled appointment   provider did not use suggestion      [facility] [address]  coding opportunities             Chart reviewed, (number of) suggestions sent to provider: 2     Problem listed updated   Provider Accepted, (number of) suggestions accepted: 2        Patients insurance company: [employer] (Medicare Advantage and Commercial)           [place] [id number]  coding opportunities             Chart reviewed, (number of) suggestions sent to provider: 2           Patients insurance company: [employer] (Medicare Advantage and Commercial)           Found on the active problem list  - please assess using MEAT for 2021  I11 0: Hypertensive heart disease with congestive heart failure (HCC)  U92 99: Diastolic congestive heart failure, unspecified HF chronicity ([facility] )

## 2021-05-13 ENCOUNTER — OFFICE VISIT (OUTPATIENT)
Dept: FAMILY MEDICINE CLINIC | Facility: CLINIC | Age: 61
End: 2021-05-13
Payer: COMMERCIAL

## 2021-05-13 ENCOUNTER — TELEPHONE (OUTPATIENT)
Dept: FAMILY MEDICINE CLINIC | Facility: CLINIC | Age: 61
End: 2021-05-13

## 2021-05-13 VITALS
HEIGHT: 64 IN | SYSTOLIC BLOOD PRESSURE: 138 MMHG | TEMPERATURE: 97.6 F | WEIGHT: 230 LBS | DIASTOLIC BLOOD PRESSURE: 78 MMHG | BODY MASS INDEX: 39.27 KG/M2

## 2021-05-13 DIAGNOSIS — K25.3 ACUTE GASTRIC ULCER WITHOUT HEMORRHAGE OR PERFORATION: ICD-10-CM

## 2021-05-13 DIAGNOSIS — N12 PYELONEPHRITIS: Primary | ICD-10-CM

## 2021-05-13 DIAGNOSIS — K21.9 GASTROESOPHAGEAL REFLUX DISEASE WITHOUT ESOPHAGITIS: ICD-10-CM

## 2021-05-13 PROCEDURE — 87086 URINE CULTURE/COLONY COUNT: CPT | Performed by: FAMILY MEDICINE

## 2021-05-13 PROCEDURE — 99213 OFFICE O/P EST LOW 20 MIN: CPT | Performed by: FAMILY MEDICINE

## 2021-05-13 RX ORDER — CIPROFLOXACIN 500 MG/1
500 TABLET, FILM COATED ORAL EVERY 12 HOURS SCHEDULED
Qty: 14 TABLET | Refills: 0 | Status: SHIPPED | OUTPATIENT
Start: 2021-05-13 | End: 2021-05-20

## 2021-05-13 RX ORDER — CEPHALEXIN 500 MG/1
500 CAPSULE ORAL EVERY 6 HOURS SCHEDULED
COMMUNITY
Start: 2021-05-09 | End: 2021-05-24

## 2021-05-13 RX ORDER — HYDROCODONE BITARTRATE AND ACETAMINOPHEN 5; 325 MG/1; MG/1
1 TABLET ORAL EVERY 6 HOURS PRN
Qty: 10 TABLET | Refills: 0 | Status: SHIPPED | OUTPATIENT
Start: 2021-05-13 | End: 2021-05-24

## 2021-05-13 RX ORDER — HYDROCODONE BITARTRATE AND ACETAMINOPHEN 5; 325 MG/1; MG/1
TABLET ORAL
COMMUNITY
Start: 2021-05-09 | End: 2021-05-13 | Stop reason: SDUPTHER

## 2021-05-13 RX ORDER — SUCRALFATE 1 G/10ML
1 SUSPENSION ORAL 2 TIMES DAILY
Qty: 420 ML | Refills: 1 | Status: SHIPPED | OUTPATIENT
Start: 2021-05-13 | End: 2021-11-09 | Stop reason: SDUPTHER

## 2021-05-13 RX ADMIN — CYANOCOBALAMIN 1000 MCG: 1000 INJECTION INTRAMUSCULAR; SUBCUTANEOUS at 10:13

## 2021-05-13 NOTE — PROGRESS NOTES
Assessment/Plan:  Patient will try to give urine for culture  Patient stop Keflex and switch over to Cipro twice daily for the next 7 days       Diagnoses and all orders for this visit:    Pyelonephritis  -     ciprofloxacin (CIPRO) 500 mg tablet; Take 1 tablet (500 mg total) by mouth every 12 (twelve) hours for 7 days    Acute gastric ulcer without hemorrhage or perforation  -     Carafate 1 GM/10ML suspension; Take 10 mL (1 g total) by mouth 2 (two) times a day    Gastroesophageal reflux disease without esophagitis  -     Carafate 1 GM/10ML suspension; Take 10 mL (1 g total) by mouth 2 (two) times a day    Other orders  -     cephalexin (KEFLEX) 500 mg capsule; Take 500 mg by mouth every 6 (six) hours   -     HYDROcodone-acetaminophen (NORCO) 5-325 mg per tablet; For moderate to severe pain, take 1 tab prn q6 hours day 1; 1 tab prn q 12 hours days 2; 1 tab prn once day 3  Subjective:        Patient ID: Aneudy Bhagat is a 59 y o  female  Patient is here status post ER visit for right flank pain  Patient urinalysis was consistent with UTI  Patient given IV antibiotics as well as morphine  Patient did have CT scan which was unremarkable for stone or hydronephrosis  Records reviewed  Patient now with recurrent pain  Patient did have sweats and chills  Patient did have some vomiting associated with this  Patient given Keflex  Patient was given hydrocodone also  No trauma noted  No urine culture done        The following portions of the patient's history were reviewed and updated as appropriate: allergies, current medications, past family history, past medical history, past social history, past surgical history and problem list       Review of Systems   Constitutional: Negative  HENT: Negative  Eyes: Negative  Respiratory: Negative  Cardiovascular: Negative  Gastrointestinal: Negative  Endocrine: Negative  Genitourinary: Positive for flank pain  Skin: Negative  Allergic/Immunologic: Negative  Neurological: Negative  Hematological: Negative  Psychiatric/Behavioral: Negative  Objective:               /78 (BP Location: Right arm, Patient Position: Sitting, Cuff Size: Adult)   Temp 97 6 °F (36 4 °C) (Tympanic)   Ht 5' 4" (1 626 m)   Wt 104 kg (230 lb)   LMP  (LMP Unknown)   BMI 39 48 kg/m²          Physical Exam  Vitals signs and nursing note reviewed  Constitutional:       General: She is not in acute distress  Appearance: Normal appearance  She is not ill-appearing, toxic-appearing or diaphoretic  HENT:      Head: Normocephalic and atraumatic  Right Ear: Tympanic membrane, ear canal and external ear normal  There is no impacted cerumen  Left Ear: Tympanic membrane, ear canal and external ear normal  There is no impacted cerumen  Nose: Nose normal  No congestion or rhinorrhea  Mouth/Throat:      Mouth: Mucous membranes are moist       Pharynx: No oropharyngeal exudate or posterior oropharyngeal erythema  Eyes:      General: No scleral icterus  Right eye: No discharge  Left eye: No discharge  Extraocular Movements: Extraocular movements intact  Conjunctiva/sclera: Conjunctivae normal       Pupils: Pupils are equal, round, and reactive to light  Neck:      Musculoskeletal: Normal range of motion and neck supple  No neck rigidity or muscular tenderness  Vascular: No carotid bruit  Cardiovascular:      Rate and Rhythm: Normal rate and regular rhythm  Pulses: Normal pulses  Heart sounds: Normal heart sounds  No murmur  No friction rub  No gallop  Pulmonary:      Effort: Pulmonary effort is normal  No respiratory distress  Breath sounds: Normal breath sounds  No stridor  No wheezing, rhonchi or rales  Chest:      Chest wall: No tenderness  Abdominal:      General: Abdomen is flat  Bowel sounds are normal  There is no distension  Palpations: Abdomen is soft  Tenderness: There is no abdominal tenderness  There is right CVA tenderness and left CVA tenderness  There is no guarding or rebound  Musculoskeletal:         General: Tenderness present  No swelling, deformity or signs of injury  Right lower leg: No edema  Left lower leg: No edema  Lymphadenopathy:      Cervical: No cervical adenopathy  Skin:     General: Skin is warm and dry  Capillary Refill: Capillary refill takes less than 2 seconds  Coloration: Skin is not jaundiced  Findings: No bruising, erythema, lesion or rash  Neurological:      Mental Status: She is alert and oriented to person, place, and time  Mental status is at baseline  Cranial Nerves: No cranial nerve deficit  Sensory: No sensory deficit  Motor: No weakness  Coordination: Coordination normal       Gait: Gait normal    Psychiatric:         Mood and Affect: Mood normal          Behavior: Behavior normal          Thought Content:  Thought content normal          Judgment: Judgment normal

## 2021-05-13 NOTE — TELEPHONE ENCOUNTER
Patient left from office visit today unable to void  She will return with clean catch urine done at home  Please let King Ary know so this can be tubed and sent for culture  Thank you

## 2021-05-14 LAB — BACTERIA UR CULT: NORMAL

## 2021-05-24 ENCOUNTER — OFFICE VISIT (OUTPATIENT)
Dept: FAMILY MEDICINE CLINIC | Facility: CLINIC | Age: 61
End: 2021-05-24
Payer: COMMERCIAL

## 2021-05-24 VITALS
HEIGHT: 64 IN | BODY MASS INDEX: 39.4 KG/M2 | SYSTOLIC BLOOD PRESSURE: 118 MMHG | TEMPERATURE: 96.6 F | DIASTOLIC BLOOD PRESSURE: 68 MMHG | WEIGHT: 230.8 LBS

## 2021-05-24 DIAGNOSIS — G47.09 OTHER INSOMNIA: ICD-10-CM

## 2021-05-24 DIAGNOSIS — R10.9 RIGHT FLANK PAIN: ICD-10-CM

## 2021-05-24 DIAGNOSIS — Z00.00 WELL ADULT EXAM: Primary | ICD-10-CM

## 2021-05-24 PROCEDURE — 3074F SYST BP LT 130 MM HG: CPT | Performed by: FAMILY MEDICINE

## 2021-05-24 PROCEDURE — 3078F DIAST BP <80 MM HG: CPT | Performed by: FAMILY MEDICINE

## 2021-05-24 PROCEDURE — 1036F TOBACCO NON-USER: CPT | Performed by: FAMILY MEDICINE

## 2021-05-24 PROCEDURE — 99396 PREV VISIT EST AGE 40-64: CPT | Performed by: FAMILY MEDICINE

## 2021-05-24 PROCEDURE — 3008F BODY MASS INDEX DOCD: CPT | Performed by: FAMILY MEDICINE

## 2021-05-24 RX ORDER — ZOLPIDEM TARTRATE 5 MG/1
5 TABLET ORAL AS NEEDED
Qty: 30 TABLET | Refills: 0 | Status: SHIPPED | OUTPATIENT
Start: 2021-05-24 | End: 2021-11-01

## 2021-05-24 RX ORDER — SUCRALFATE 1 G/1
1 TABLET ORAL 4 TIMES DAILY
Qty: 120 TABLET | Refills: 5 | Status: SHIPPED | OUTPATIENT
Start: 2021-05-24 | End: 2021-12-22 | Stop reason: SDUPTHER

## 2021-05-24 RX ORDER — NYSTATIN 100000 [USP'U]/G
POWDER TOPICAL
COMMUNITY
Start: 2021-05-22 | End: 2022-06-09 | Stop reason: SDUPTHER

## 2021-05-24 RX ORDER — NITROFURANTOIN 25; 75 MG/1; MG/1
100 CAPSULE ORAL 2 TIMES DAILY
Qty: 20 CAPSULE | Refills: 0 | Status: SHIPPED | OUTPATIENT
Start: 2021-05-24 | End: 2021-06-03

## 2021-05-24 NOTE — PROGRESS NOTES
Assessment/Plan: CT scan ultrasound discussed patient  Patient have UA C&S done again  Patient up-to-date with gynecologic care as well as mammograms and colorectal screening  Patient refusing vaccines  Patient go for ultrasound of the kidneys       Diagnoses and all orders for this visit:    Well adult exam  -     nitrofurantoin (MACROBID) 100 mg capsule; Take 1 capsule (100 mg total) by mouth 2 (two) times a day for 10 days  -     sucralfate (CARAFATE) 1 g tablet; Take 1 tablet (1 g total) by mouth 4 (four) times a day    Other insomnia  -     zolpidem (AMBIEN) 5 mg tablet; Take 1 tablet (5 mg total) by mouth as needed for sleep (for use during sleep study)    Right flank pain  -     US kidney and bladder; Future    Other orders  -     nystatin (MYCOSTATIN) powder            Subjective:        Patient ID: Jitendra Parikh is a 59 y o  female  Patient is here for wellness exam   Patient up-to-date with gynecologic evaluations as well as mammograms  Patient up-to-date with colorectal screenings  Vaccines labs reviewed  The following portions of the patient's history were reviewed and updated as appropriate: allergies, current medications, past family history, past medical history, past social history, past surgical history and problem list       Review of Systems   Constitutional: Negative  HENT: Negative  Eyes: Negative  Respiratory: Negative  Cardiovascular: Negative  Gastrointestinal: Negative  Endocrine: Negative  Genitourinary: Negative  Musculoskeletal: Positive for back pain  Skin: Negative  Allergic/Immunologic: Negative  Neurological: Negative  Hematological: Negative  Psychiatric/Behavioral: Negative  Objective:      BMI Counseling: Body mass index is 39 62 kg/m²  The BMI is above normal  Nutrition recommendations include decreasing portion sizes  Exercise recommendations include moderate physical activity 150 minutes/week                 BP 118/68 (BP Location: Right arm, Patient Position: Sitting, Cuff Size: Large)   Temp (!) 96 6 °F (35 9 °C) (Tympanic)   Ht 5' 4" (1 626 m)   Wt 105 kg (230 lb 12 8 oz)   LMP  (LMP Unknown)   BMI 39 62 kg/m²          Physical Exam  Vitals signs and nursing note reviewed  Constitutional:       General: She is not in acute distress  Appearance: Normal appearance  She is not ill-appearing, toxic-appearing or diaphoretic  HENT:      Head: Normocephalic and atraumatic  Right Ear: Tympanic membrane, ear canal and external ear normal  There is no impacted cerumen  Left Ear: Tympanic membrane, ear canal and external ear normal  There is no impacted cerumen  Nose: Nose normal  No congestion or rhinorrhea  Mouth/Throat:      Mouth: Mucous membranes are moist       Pharynx: No oropharyngeal exudate or posterior oropharyngeal erythema  Eyes:      General: No scleral icterus  Right eye: No discharge  Left eye: No discharge  Extraocular Movements: Extraocular movements intact  Conjunctiva/sclera: Conjunctivae normal       Pupils: Pupils are equal, round, and reactive to light  Neck:      Musculoskeletal: Normal range of motion and neck supple  No neck rigidity or muscular tenderness  Vascular: No carotid bruit  Cardiovascular:      Rate and Rhythm: Normal rate and regular rhythm  Pulses: Normal pulses  Heart sounds: Normal heart sounds  No murmur  No friction rub  No gallop  Pulmonary:      Effort: Pulmonary effort is normal  No respiratory distress  Breath sounds: Normal breath sounds  No stridor  No wheezing, rhonchi or rales  Chest:      Chest wall: No tenderness  Abdominal:      General: Abdomen is flat  Bowel sounds are normal  There is no distension  Palpations: Abdomen is soft  Tenderness: There is no abdominal tenderness  There is no guarding or rebound  Musculoskeletal: Normal range of motion           General: Tenderness present  No swelling, deformity or signs of injury  Right lower leg: No edema  Left lower leg: No edema  Lymphadenopathy:      Cervical: No cervical adenopathy  Skin:     General: Skin is warm and dry  Capillary Refill: Capillary refill takes less than 2 seconds  Coloration: Skin is not jaundiced  Findings: No bruising, erythema, lesion or rash  Neurological:      Mental Status: She is alert and oriented to person, place, and time  Mental status is at baseline  Cranial Nerves: No cranial nerve deficit  Sensory: No sensory deficit  Motor: No weakness  Coordination: Coordination normal       Gait: Gait normal    Psychiatric:         Mood and Affect: Mood normal          Behavior: Behavior normal          Thought Content:  Thought content normal          Judgment: Judgment normal

## 2021-05-31 DIAGNOSIS — I48.0 PAROXYSMAL ATRIAL FIBRILLATION (HCC): ICD-10-CM

## 2021-06-01 ENCOUNTER — HOSPITAL ENCOUNTER (OUTPATIENT)
Dept: ULTRASOUND IMAGING | Facility: MEDICAL CENTER | Age: 61
Discharge: HOME | End: 2021-06-01
Payer: COMMERCIAL

## 2021-06-01 DIAGNOSIS — R10.9 RIGHT FLANK PAIN: ICD-10-CM

## 2021-06-01 PROCEDURE — 76770 US EXAM ABDO BACK WALL COMP: CPT

## 2021-06-02 RX ORDER — APIXABAN 5 MG/1
TABLET, FILM COATED ORAL
Qty: 180 TABLET | Refills: 0 | Status: SHIPPED | OUTPATIENT
Start: 2021-06-02 | End: 2021-09-14

## 2021-06-22 ENCOUNTER — RA CDI HCC (OUTPATIENT)
Dept: OTHER | Facility: HOSPITAL | Age: 61
End: 2021-06-22

## 2021-06-22 NOTE — PROGRESS NOTES
[place] [id number]  coding opportunities             Chart reviewed, (number of) suggestions sent to provider: 2     Problem listed updated  Provider Accepted, (number of) suggestions accepted: 2               Patients insurance company: [employer] (Medicare Advantage and Commercial)     Visit status: Patient canceled the appointment        [place] [address]  coding opportunities             Chart reviewed, (number of) suggestions sent to provider: 2     Problem listed updated   Provider Accepted, (number of) suggestions accepted: 2               Patients insurance company: [employer] (Medicare Advantage and Commercial)           [place] [address]  coding opportunities             Chart reviewed, (number of) suggestions sent to provider: 2                  Patients insurance company: [employer] (Medicare Advantage and Commercial)           Found on active problem list - Please assess using MEAT for 2021 billing  I11 0: Hypertensive heart disease with congestive heart failure (HCC)  A60 74: Diastolic congestive heart failure, unspecified HF chronicity ([place] [address] )

## 2021-07-02 ENCOUNTER — OFFICE VISIT (OUTPATIENT)
Dept: FAMILY MEDICINE CLINIC | Facility: CLINIC | Age: 61
End: 2021-07-02
Payer: COMMERCIAL

## 2021-07-02 VITALS
SYSTOLIC BLOOD PRESSURE: 130 MMHG | WEIGHT: 228.6 LBS | BODY MASS INDEX: 39.03 KG/M2 | DIASTOLIC BLOOD PRESSURE: 64 MMHG | HEIGHT: 64 IN | TEMPERATURE: 96.7 F

## 2021-07-02 DIAGNOSIS — K25.3 ACUTE GASTRIC ULCER WITHOUT HEMORRHAGE OR PERFORATION: ICD-10-CM

## 2021-07-02 DIAGNOSIS — I48.0 PAROXYSMAL ATRIAL FIBRILLATION (HCC): Primary | ICD-10-CM

## 2021-07-02 DIAGNOSIS — I11.0 HYPERTENSIVE HEART DISEASE WITH CONGESTIVE HEART FAILURE, UNSPECIFIED HEART FAILURE TYPE (HCC): ICD-10-CM

## 2021-07-02 DIAGNOSIS — E78.2 MIXED HYPERLIPIDEMIA: ICD-10-CM

## 2021-07-02 DIAGNOSIS — K21.9 GASTROESOPHAGEAL REFLUX DISEASE WITHOUT ESOPHAGITIS: ICD-10-CM

## 2021-07-02 PROCEDURE — 99214 OFFICE O/P EST MOD 30 MIN: CPT | Performed by: FAMILY MEDICINE

## 2021-07-02 PROCEDURE — 3078F DIAST BP <80 MM HG: CPT | Performed by: FAMILY MEDICINE

## 2021-07-02 PROCEDURE — 1036F TOBACCO NON-USER: CPT | Performed by: FAMILY MEDICINE

## 2021-07-02 PROCEDURE — 3008F BODY MASS INDEX DOCD: CPT | Performed by: FAMILY MEDICINE

## 2021-07-02 PROCEDURE — 3075F SYST BP GE 130 - 139MM HG: CPT | Performed by: FAMILY MEDICINE

## 2021-07-02 RX ORDER — DILTIAZEM HYDROCHLORIDE 120 MG/1
120 CAPSULE, EXTENDED RELEASE ORAL DAILY
COMMUNITY
Start: 2021-06-29 | End: 2021-09-22 | Stop reason: SDUPTHER

## 2021-07-02 RX ORDER — CHLORDIAZEPOXIDE HYDROCHLORIDE AND CLIDINIUM BROMIDE 5; 2.5 MG/1; MG/1
1 CAPSULE ORAL
Qty: 60 CAPSULE | Refills: 5 | Status: SHIPPED | OUTPATIENT
Start: 2021-07-02

## 2021-07-02 RX ORDER — OMEPRAZOLE 20 MG/1
20 CAPSULE, DELAYED RELEASE ORAL DAILY
Qty: 90 CAPSULE | Refills: 1 | Status: SHIPPED | OUTPATIENT
Start: 2021-07-02 | End: 2022-05-09

## 2021-07-02 RX ADMIN — CYANOCOBALAMIN 1000 MCG: 1000 INJECTION INTRAMUSCULAR; SUBCUTANEOUS at 12:15

## 2021-07-02 NOTE — PROGRESS NOTES
Assessment/Plan:  Blood pressure stable at this time  Patient in normal sinus rhythm at this time  Patient will continue follow-up with Cardiology appropriately and start diltiazem as directed  The patient will continue with cardiac meds  Patient uses Dexilant and Prilosec intermittently and not same day  Patient start Librax  As this has worked for her in the past  Patient will follow up GI if not seeing improvement  Follow-up in 3 months       Diagnoses and all orders for this visit:    Paroxysmal atrial fibrillation (HCC)    Gastroesophageal reflux disease without esophagitis  -     omeprazole (PriLOSEC) 20 mg delayed release capsule; Take 1 capsule (20 mg total) by mouth daily  -     chlordiazepoxide-clidinium (LIBRAX) 5-2 5 mg per capsule; Take 1 capsule by mouth 2 (two) times a day before meals    Hypertensive heart disease with congestive heart failure, unspecified heart failure type ([facility] [address] )    Acute gastric ulcer without hemorrhage or perforation    Mixed hyperlipidemia    Other orders  -     diltiazem (TIAZAC) 120 MG 24 hr capsule; Take 120 mg by mouth daily  -     Cholecalciferol 25 MCG (1000 UT) tablet; Take 1,000 Units by mouth daily            Subjective:        Patient ID: Kai Topete is a 59 y o  female  Patient here to follow-up on GERD, gastritis with history of gastric ulcer as well as Afib hypertension hyperlipidemia  Patient does notice some epigastric/left upper quadrant discomfort which is improved with belching and burping  No vomiting  No fevers or chills or change in stool habits  Normal urination  No substernal chest pain low patient does have some pain on the left with gastric irritation  Patient is being started on diltiazem with Cardiology in the near future  Meds reviewed  Patient did use Librax in the past with GI with good results  Patient has had problems with Bentyl          The following portions of the patient's history were reviewed and updated as appropriate: allergies, current medications, past family history, past medical history, past social history, past surgical history and problem list       Review of Systems   Constitutional: Negative  HENT: Negative  Eyes: Negative  Respiratory: Negative  Cardiovascular: Positive for palpitations  Gastrointestinal: Positive for abdominal pain  Negative for blood in stool, constipation, diarrhea and vomiting  GERD   Endocrine: Negative  Genitourinary: Negative  Musculoskeletal: Negative  Skin: Negative  Allergic/Immunologic: Negative  Neurological: Negative  Hematological: Negative  Psychiatric/Behavioral: Negative  Objective:      BMI Counseling: Body mass index is 39 24 kg/m²  The BMI is above normal  Nutrition recommendations include decreasing portion sizes  Exercise recommendations include moderate physical activity 150 minutes/week  /64 (BP Location: Right arm, Patient Position: Sitting, Cuff Size: Standard)   Temp (!) 96 7 °F (35 9 °C) (Tympanic)   Ht 5' 4" (1 626 m)   Wt 104 kg (228 lb 9 6 oz)   LMP  (LMP Unknown)   BMI 39 24 kg/m²          Physical Exam  Vitals and nursing note reviewed  Constitutional:       General: She is not in acute distress  Appearance: Normal appearance  She is not ill-appearing, toxic-appearing or diaphoretic  HENT:      Head: Normocephalic and atraumatic  Right Ear: Tympanic membrane, ear canal and external ear normal  There is no impacted cerumen  Left Ear: Tympanic membrane, ear canal and external ear normal  There is no impacted cerumen  Nose: Nose normal  No congestion or rhinorrhea  Mouth/Throat:      Mouth: Mucous membranes are moist       Pharynx: No oropharyngeal exudate or posterior oropharyngeal erythema  Eyes:      General: No scleral icterus  Right eye: No discharge  Left eye: No discharge  Extraocular Movements: Extraocular movements intact  Conjunctiva/sclera: Conjunctivae normal       Pupils: Pupils are equal, round, and reactive to light  Neck:      Vascular: No carotid bruit  Cardiovascular:      Rate and Rhythm: Normal rate and regular rhythm  Pulses: Normal pulses  Heart sounds: Normal heart sounds  No murmur heard  No friction rub  No gallop  Pulmonary:      Effort: Pulmonary effort is normal  No respiratory distress  Breath sounds: Normal breath sounds  No stridor  No wheezing, rhonchi or rales  Chest:      Chest wall: No tenderness  Abdominal:      General: Abdomen is flat  Bowel sounds are normal  There is no distension  Palpations: Abdomen is soft  Tenderness: There is no abdominal tenderness  There is no guarding or rebound  Musculoskeletal:         General: No swelling, tenderness, deformity or signs of injury  Normal range of motion  Cervical back: Normal range of motion and neck supple  No rigidity  No muscular tenderness  Right lower leg: No edema  Left lower leg: No edema  Lymphadenopathy:      Cervical: No cervical adenopathy  Skin:     General: Skin is warm and dry  Capillary Refill: Capillary refill takes less than 2 seconds  Coloration: Skin is not jaundiced  Findings: No bruising, erythema, lesion or rash  Neurological:      Mental Status: She is alert and oriented to person, place, and time  Mental status is at baseline  Cranial Nerves: No cranial nerve deficit  Sensory: No sensory deficit  Motor: No weakness  Coordination: Coordination normal       Gait: Gait normal    Psychiatric:         Mood and Affect: Mood normal          Behavior: Behavior normal          Thought Content:  Thought content normal          Judgment: Judgment normal

## 2021-07-06 ENCOUNTER — PATIENT MESSAGE (OUTPATIENT)
Dept: SLEEP CENTER | Facility: CLINIC | Age: 61
End: 2021-07-06

## 2021-07-07 ENCOUNTER — TELEPHONE (OUTPATIENT)
Dept: SLEEP CENTER | Facility: CLINIC | Age: 61
End: 2021-07-07

## 2021-07-07 ENCOUNTER — TELEPHONE (OUTPATIENT)
Dept: FAMILY MEDICINE CLINIC | Facility: CLINIC | Age: 61
End: 2021-07-07

## 2021-07-07 DIAGNOSIS — G47.33 OSA (OBSTRUCTIVE SLEEP APNEA): Primary | ICD-10-CM

## 2021-07-07 NOTE — TELEPHONE ENCOUNTER
Dr Fox Cover patient seen by you 3/12/2021  Next appointment is 8/24/2021  Please will you write RX for replacement of recalled Pranay machine

## 2021-07-07 NOTE — TELEPHONE ENCOUNTER
----- Message from Jose Pedersen sent at 7/6/2021  3:26 PM EDT -----  Regarding: Prescription Question  Contact: [phone]  Hello could you please please send a prescription of my CPAP machine and a copy of the sleep study that I had to [facility] pharmacy their fax number is [phone]  It's very important that I get my sleep machine because I'm not feeling too well with the AFib  I did have a call into the doctor but maybe he didn't get my message because I do not know what to do except get a new machine from a different company because I called [facility] and they can't give me a date when it's going to be replaced  So I would really appreciate it so much if you can fax the prescription and the copy of the sleep study for insurance purposes,over to [facility] pharmacy  I did check my machine serial number out on [employer] and Yes mine machine is affected so I stopped it right away  Jose Pedersen  Could you please send me a response to this email?

## 2021-07-07 NOTE — TELEPHONE ENCOUNTER
PA was processed 07/07/2021through [facility]; awaiting decision from [address] [place] [address],[address] @ [phone]

## 2021-07-09 ENCOUNTER — TELEPHONE (OUTPATIENT)
Dept: SLEEP CENTER | Facility: CLINIC | Age: 61
End: 2021-07-09

## 2021-07-09 NOTE — TELEPHONE ENCOUNTER
Order for replacement CPAP and sleep study faxed to [facility]  Sent [employer] message to patient  Made aware of above

## 2021-07-09 NOTE — TELEPHONE ENCOUNTER
Pt came in and returned cpap will be going through a different company to get a cpap due to the recall

## 2021-07-16 ENCOUNTER — TELEPHONE (OUTPATIENT)
Dept: FAMILY MEDICINE CLINIC | Facility: CLINIC | Age: 61
End: 2021-07-16

## 2021-07-16 NOTE — TELEPHONE ENCOUNTER
Patient would like Fioricet prescribed for headaches to [address] [place] [address]  Last prescribed on 12/18/2020

## 2021-07-16 NOTE — TELEPHONE ENCOUNTER
A letter was written and faxed to [facility] Util[facility],  today for the above request of Librax capsules; awaiting response

## 2021-07-19 DIAGNOSIS — G43.001 MIGRAINE WITHOUT AURA AND WITH STATUS MIGRAINOSUS, NOT INTRACTABLE: Primary | ICD-10-CM

## 2021-07-19 RX ORDER — BUTALBITAL, ACETAMINOPHEN AND CAFFEINE 50; 325; 40 MG/1; MG/1; MG/1
1 TABLET ORAL EVERY 4 HOURS PRN
Qty: 30 TABLET | Refills: 0 | Status: SHIPPED | OUTPATIENT
Start: 2021-07-19 | End: 2021-07-20 | Stop reason: SDUPTHER

## 2021-07-19 NOTE — TELEPHONE ENCOUNTER
Patient called in requesting a refill on fiorcet for her migraines  Please review pended medication and signd

## 2021-07-20 DIAGNOSIS — G43.001 MIGRAINE WITHOUT AURA AND WITH STATUS MIGRAINOSUS, NOT INTRACTABLE: ICD-10-CM

## 2021-07-20 RX ORDER — BUTALBITAL, ACETAMINOPHEN AND CAFFEINE 50; 325; 40 MG/1; MG/1; MG/1
1 TABLET ORAL EVERY 4 HOURS PRN
Qty: 30 TABLET | Refills: 0 | Status: SHIPPED | OUTPATIENT
Start: 2021-07-20 | End: 2021-08-05 | Stop reason: SDUPTHER

## 2021-07-27 ENCOUNTER — OFFICE VISIT (OUTPATIENT)
Dept: FAMILY MEDICINE CLINIC | Facility: CLINIC | Age: 61
End: 2021-07-27
Payer: COMMERCIAL

## 2021-07-27 VITALS
TEMPERATURE: 96.7 F | HEIGHT: 64 IN | BODY MASS INDEX: 39.47 KG/M2 | DIASTOLIC BLOOD PRESSURE: 100 MMHG | SYSTOLIC BLOOD PRESSURE: 160 MMHG | WEIGHT: 231.2 LBS

## 2021-07-27 DIAGNOSIS — R10.11 RIGHT UPPER QUADRANT PAIN: ICD-10-CM

## 2021-07-27 DIAGNOSIS — R10.12 LEFT UPPER QUADRANT PAIN: Primary | ICD-10-CM

## 2021-07-27 LAB
BILIRUB UR QL STRIP: NEGATIVE
CLARITY UR: CLEAR
COLOR UR: YELLOW
GLUCOSE UR STRIP-MCNC: NEGATIVE MG/DL
HGB UR QL STRIP.AUTO: NEGATIVE
KETONES UR STRIP-MCNC: NEGATIVE MG/DL
LEUKOCYTE ESTERASE UR QL STRIP: NEGATIVE
NITRITE UR QL STRIP: NEGATIVE
PH UR STRIP.AUTO: 6.5 [PH]
PROT UR STRIP-MCNC: NEGATIVE MG/DL
SP GR UR STRIP.AUTO: 1.02 (ref 1–1.03)
UROBILINOGEN UR QL STRIP.AUTO: 0.2 E.U./DL

## 2021-07-27 PROCEDURE — 81003 URINALYSIS AUTO W/O SCOPE: CPT | Performed by: FAMILY MEDICINE

## 2021-07-27 PROCEDURE — 87086 URINE CULTURE/COLONY COUNT: CPT | Performed by: FAMILY MEDICINE

## 2021-07-27 PROCEDURE — 99214 OFFICE O/P EST MOD 30 MIN: CPT | Performed by: FAMILY MEDICINE

## 2021-07-27 NOTE — PROGRESS NOTES
Assessment/Plan: patient have laboratory studies well as urinalysis done  Patient go for CT scan of the abdomen and pelvis  Patient will add Carafate liquid 4 times daily  Follow-up next week  Note for patient remain out of work for 1 week  Diagnoses and all orders for this visit:    Left upper quadrant pain  -     CBC and differential; Future  -     Comprehensive metabolic panel; Future  -     TSH, 3rd generation with Free T4 reflex; Future  -     Lipase; Future  -     UA w Reflex to Microscopic w Reflex to Culture - Clinic Collect  -     CT abdomen pelvis w contrast; Future    Right upper quadrant pain  -     CBC and differential; Future  -     Comprehensive metabolic panel; Future  -     TSH, 3rd generation with Free T4 reflex; Future  -     Lipase; Future  -     UA w Reflex to Microscopic w Reflex to Culture - Clinic Collect  -     CT abdomen pelvis w contrast; Future            Subjective:        Patient ID: Ira Calderon is a 59 y o  female  Patient is here with pain in the epigastric and left upper quadrant with some radiation to right upper quadrant over the past 3 weeks  Patient does notice pain immediately after eating or drinking  Patient with nausea but no vomiting  Patient with soft bowel months  Patient does notice tannish colored stool  Patient does feel hot intermittently  Patient started  Dexilant without significant improvement  Patient with some shortness of breath  Patient does use inhaler with improvement  No chest pain        The following portions of the patient's history were reviewed and updated as appropriate: allergies, current medications, past family history, past medical history, past social history, past surgical history and problem list       Review of Systems   Constitutional: Negative  HENT: Negative  Eyes: Negative  Respiratory: Negative  Cardiovascular: Negative  Gastrointestinal: Positive for abdominal pain and nausea  Endocrine: Negative  Genitourinary: Negative  Musculoskeletal: Positive for back pain  Skin: Negative  Allergic/Immunologic: Negative  Neurological: Negative  Hematological: Negative  Psychiatric/Behavioral: Negative  Objective:               /100 (BP Location: Right arm, Patient Position: Sitting, Cuff Size: Large)   Temp (!) 96 7 °F (35 9 °C) (Tympanic)   Ht 5' 4" (1 626 m)   Wt 105 kg (231 lb 3 2 oz)   LMP  (LMP Unknown)   BMI 39 69 kg/m²          Physical Exam  Vitals and nursing note reviewed  Constitutional:       General: She is not in acute distress  Appearance: Normal appearance  She is not ill-appearing, toxic-appearing or diaphoretic  HENT:      Head: Normocephalic and atraumatic  Right Ear: Tympanic membrane, ear canal and external ear normal  There is no impacted cerumen  Left Ear: Tympanic membrane, ear canal and external ear normal  There is no impacted cerumen  Nose: Nose normal  No congestion or rhinorrhea  Mouth/Throat:      Mouth: Mucous membranes are moist       Pharynx: No oropharyngeal exudate or posterior oropharyngeal erythema  Eyes:      General: No scleral icterus  Right eye: No discharge  Left eye: No discharge  Extraocular Movements: Extraocular movements intact  Conjunctiva/sclera: Conjunctivae normal       Pupils: Pupils are equal, round, and reactive to light  Neck:      Vascular: No carotid bruit  Cardiovascular:      Rate and Rhythm: Normal rate and regular rhythm  Pulses: Normal pulses  Heart sounds: Normal heart sounds  No murmur heard  No friction rub  No gallop  Pulmonary:      Effort: Pulmonary effort is normal  No respiratory distress  Breath sounds: Normal breath sounds  No stridor  No wheezing, rhonchi or rales  Chest:      Chest wall: No tenderness  Abdominal:      General: Abdomen is flat  Bowel sounds are normal  There is no distension  Palpations: Abdomen is soft  Tenderness: There is abdominal tenderness  There is no guarding or rebound  Comments: Right upper quadrant and left upper quadrant pain with palpation  Musculoskeletal:         General: No swelling, tenderness, deformity or signs of injury  Normal range of motion  Cervical back: Normal range of motion and neck supple  No rigidity  No muscular tenderness  Right lower leg: No edema  Left lower leg: No edema  Lymphadenopathy:      Cervical: No cervical adenopathy  Skin:     General: Skin is warm and dry  Capillary Refill: Capillary refill takes less than 2 seconds  Coloration: Skin is not jaundiced  Findings: No bruising, erythema, lesion or rash  Neurological:      Mental Status: She is alert and oriented to person, place, and time  Mental status is at baseline  Cranial Nerves: No cranial nerve deficit  Sensory: No sensory deficit  Motor: No weakness  Coordination: Coordination normal       Gait: Gait normal    Psychiatric:         Mood and Affect: Mood normal          Behavior: Behavior normal          Thought Content:  Thought content normal          Judgment: Judgment normal

## 2021-07-28 LAB
ALBUMIN SERPL-MCNC: 4.9 G/DL (ref 3.6–5.1)
ALBUMIN/GLOB SERPL: 2.1 (CALC) (ref 1–2.5)
ALP SERPL-CCNC: 119 U/L (ref 37–153)
ALT SERPL-CCNC: 44 U/L (ref 6–29)
AST SERPL-CCNC: 44 U/L (ref 10–35)
BASOPHILS # BLD AUTO: 48 CELLS/UL (ref 0–200)
BASOPHILS NFR BLD AUTO: 0.7 %
BILIRUB SERPL-MCNC: 0.4 MG/DL (ref 0.2–1.2)
BUN SERPL-MCNC: 16 MG/DL (ref 7–25)
BUN/CREAT SERPL: ABNORMAL (CALC) (ref 6–22)
CALCIUM SERPL-MCNC: 9.9 MG/DL (ref 8.6–10.4)
CHLORIDE SERPL-SCNC: 100 MMOL/L (ref 98–110)
CO2 SERPL-SCNC: 27 MMOL/L (ref 20–32)
CREAT SERPL-MCNC: 0.74 MG/DL (ref 0.5–0.99)
EOSINOPHIL # BLD AUTO: 117 CELLS/UL (ref 15–500)
EOSINOPHIL NFR BLD AUTO: 1.7 %
ERYTHROCYTE [DISTWIDTH] IN BLOOD BY AUTOMATED COUNT: 14 % (ref 11–15)
GLOBULIN SER CALC-MCNC: 2.3 G/DL (CALC) (ref 1.9–3.7)
GLUCOSE SERPL-MCNC: 122 MG/DL (ref 65–99)
HCT VFR BLD AUTO: 39.2 % (ref 35–45)
HGB BLD-MCNC: 12.4 G/DL (ref 11.7–15.5)
LIPASE SERPL-CCNC: 17 U/L (ref 7–60)
LYMPHOCYTES # BLD AUTO: 1456 CELLS/UL (ref 850–3900)
LYMPHOCYTES NFR BLD AUTO: 21.1 %
MCH RBC QN AUTO: 27.3 PG (ref 27–33)
MCHC RBC AUTO-ENTMCNC: 31.6 G/DL (ref 32–36)
MCV RBC AUTO: 86.3 FL (ref 80–100)
MONOCYTES # BLD AUTO: 476 CELLS/UL (ref 200–950)
MONOCYTES NFR BLD AUTO: 6.9 %
NEUTROPHILS # BLD AUTO: 4802 CELLS/UL (ref 1500–7800)
NEUTROPHILS NFR BLD AUTO: 69.6 %
PLATELET # BLD AUTO: 271 THOUSAND/UL (ref 140–400)
PMV BLD REES-ECKER: 9.9 FL (ref 7.5–12.5)
POTASSIUM SERPL-SCNC: 4 MMOL/L (ref 3.5–5.3)
PROT SERPL-MCNC: 7.2 G/DL (ref 6.1–8.1)
RBC # BLD AUTO: 4.54 MILLION/UL (ref 3.8–5.1)
SL AMB EGFR AFRICAN AMERICAN: 100 ML/MIN/1.73M2
SL AMB EGFR NON AFRICAN AMERICAN: 86 ML/MIN/1.73M2
SODIUM SERPL-SCNC: 138 MMOL/L (ref 135–146)
TSH SERPL-ACNC: 1.92 MIU/L (ref 0.4–4.5)
WBC # BLD AUTO: 6.9 THOUSAND/UL (ref 3.8–10.8)

## 2021-07-29 ENCOUNTER — RA CDI HCC (OUTPATIENT)
Dept: OTHER | Facility: HOSPITAL | Age: 61
End: 2021-07-29

## 2021-07-29 LAB — BACTERIA UR CULT: NORMAL

## 2021-07-29 NOTE — PROGRESS NOTES
[place] [address]  coding opportunities             Chart Reviewed * (Number of) Inbasket suggestions sent to Provider: 1                  Patients insurance company: [employer] (Medicare Advantage and Commercial)     Visit status: Patient arrived for their scheduled appointment     Provider never responded to [place] [address]  coding request     [place] [address]  coding opportunities             Chart reviewed, (number of) suggestions sent to provider: 1                  Patients insurance company: [employer] (Medicare Advantage and Commercial)           E78 66: Diastolic congestive heart failure, unspecified HF chronicity ([place] [address] )

## 2021-07-30 ENCOUNTER — TELEPHONE (OUTPATIENT)
Dept: FAMILY MEDICINE CLINIC | Facility: CLINIC | Age: 61
End: 2021-07-30

## 2021-07-30 DIAGNOSIS — R53.82 CHRONIC FATIGUE: ICD-10-CM

## 2021-07-30 DIAGNOSIS — M54.9 CHRONIC BACK PAIN, UNSPECIFIED BACK LOCATION, UNSPECIFIED BACK PAIN LATERALITY: ICD-10-CM

## 2021-07-30 DIAGNOSIS — G89.4 CHRONIC PAIN DISORDER: ICD-10-CM

## 2021-07-30 DIAGNOSIS — R10.9 RIGHT FLANK PAIN: Primary | ICD-10-CM

## 2021-07-30 DIAGNOSIS — G89.29 CHRONIC BACK PAIN, UNSPECIFIED BACK LOCATION, UNSPECIFIED BACK PAIN LATERALITY: ICD-10-CM

## 2021-07-30 NOTE — TELEPHONE ENCOUNTER
Spoke with patient today and stated that Dr Jaskaran Tovar does not give any P-P and due to denial of CT of the Abdomen for lack of information, but is not cleared (No Auth Necessary)  for US of abdomen  Patient was instructed to call our appointment encounter to set up an appointment for this exam with radiology

## 2021-08-02 ENCOUNTER — HOSPITAL ENCOUNTER (OUTPATIENT)
Dept: ULTRASOUND IMAGING | Facility: HOSPITAL | Age: 61
Discharge: HOME | End: 2021-08-02
Payer: COMMERCIAL

## 2021-08-02 DIAGNOSIS — G89.4 CHRONIC PAIN DISORDER: ICD-10-CM

## 2021-08-02 DIAGNOSIS — R10.9 RIGHT FLANK PAIN: ICD-10-CM

## 2021-08-02 DIAGNOSIS — R53.82 CHRONIC FATIGUE: ICD-10-CM

## 2021-08-02 PROCEDURE — 76700 US EXAM ABDOM COMPLETE: CPT

## 2021-08-05 ENCOUNTER — DOCUMENTATION (OUTPATIENT)
Dept: FAMILY MEDICINE CLINIC | Facility: CLINIC | Age: 61
End: 2021-08-05

## 2021-08-05 ENCOUNTER — OFFICE VISIT (OUTPATIENT)
Dept: FAMILY MEDICINE CLINIC | Facility: CLINIC | Age: 61
End: 2021-08-05
Payer: COMMERCIAL

## 2021-08-05 ENCOUNTER — TELEPHONE (OUTPATIENT)
Dept: FAMILY MEDICINE CLINIC | Facility: CLINIC | Age: 61
End: 2021-08-05

## 2021-08-05 VITALS
HEIGHT: 64 IN | WEIGHT: 230 LBS | SYSTOLIC BLOOD PRESSURE: 142 MMHG | BODY MASS INDEX: 39.27 KG/M2 | DIASTOLIC BLOOD PRESSURE: 100 MMHG | TEMPERATURE: 98 F

## 2021-08-05 DIAGNOSIS — G89.4 CHRONIC PAIN DISORDER: ICD-10-CM

## 2021-08-05 DIAGNOSIS — G43.001 MIGRAINE WITHOUT AURA AND WITH STATUS MIGRAINOSUS, NOT INTRACTABLE: ICD-10-CM

## 2021-08-05 DIAGNOSIS — R10.9 RIGHT FLANK PAIN: ICD-10-CM

## 2021-08-05 DIAGNOSIS — K57.92 DIVERTICULITIS: Primary | ICD-10-CM

## 2021-08-05 DIAGNOSIS — R10.9 ABDOMINAL PAIN DETERMINED BY EXAMINATION: ICD-10-CM

## 2021-08-05 PROCEDURE — 3008F BODY MASS INDEX DOCD: CPT | Performed by: FAMILY MEDICINE

## 2021-08-05 PROCEDURE — 1036F TOBACCO NON-USER: CPT | Performed by: FAMILY MEDICINE

## 2021-08-05 PROCEDURE — 3077F SYST BP >= 140 MM HG: CPT | Performed by: FAMILY MEDICINE

## 2021-08-05 PROCEDURE — 3080F DIAST BP >= 90 MM HG: CPT | Performed by: FAMILY MEDICINE

## 2021-08-05 PROCEDURE — 99213 OFFICE O/P EST LOW 20 MIN: CPT | Performed by: FAMILY MEDICINE

## 2021-08-05 RX ORDER — AMOXICILLIN AND CLAVULANATE POTASSIUM 875; 125 MG/1; MG/1
1 TABLET, FILM COATED ORAL EVERY 12 HOURS SCHEDULED
Qty: 14 TABLET | Refills: 0 | Status: SHIPPED | OUTPATIENT
Start: 2021-08-05 | End: 2021-08-12

## 2021-08-05 RX ORDER — BUTALBITAL, ACETAMINOPHEN AND CAFFEINE 50; 325; 40 MG/1; MG/1; MG/1
1 TABLET ORAL EVERY 4 HOURS PRN
Qty: 30 TABLET | Refills: 0 | Status: SHIPPED | OUTPATIENT
Start: 2021-08-05 | End: 2021-09-14 | Stop reason: SDUPTHER

## 2021-08-05 RX ADMIN — CYANOCOBALAMIN 1000 MCG: 1000 INJECTION INTRAMUSCULAR; SUBCUTANEOUS at 13:22

## 2021-08-05 NOTE — TELEPHONE ENCOUNTER
Spoke with patient and [address] Nurse Reviewer today 08/05/2021  It was explained to me, that the patient must take a US Pelvis Completed w/Transvaginal and a US Pelvis Complete Non OB in order to move to the next level of US/CT of the abdomen exams  In my research, I did notice that patient had taken the two request exams in 2020 but was told that the dated were over a  6 months period and would be considered old and the Nurse needs to have new examines to show the Clinical Doctor when we appeal for patient denied CT/US Abdomen exams  I was also advise by the Clinical Nurse Reviewer to send in patients' recent UA and Culture Urine Lab Exams  I am now awaiting dates for the above new test exams from the patient before I call patients' insurance for approval     Patient return my call today 08/05/21 and stated that 08/11/2021, both exams are scheduled

## 2021-08-05 NOTE — PROGRESS NOTES
Assessment/Plan:  Patient will start Augmentin as directed for probable diverticulitis  Patient go for CT scan  Diagnoses and all orders for this visit:    Diverticulitis  -     CT abdomen pelvis w contrast; Future  -     amoxicillin-clavulanate (AUGMENTIN) 875-125 mg per tablet; Take 1 tablet by mouth every 12 (twelve) hours for 7 days    Migraine without aura and with status migrainosus, not intractable  -     butalbital-acetaminophen-caffeine (FIORICET,ESGIC) -40 mg per tablet; Take 1 tablet by mouth every 4 (four) hours as needed for headaches            Subjective:        Patient ID: Jose Pedersen is a 59 y o  female  Patient is here to follow-up on right and left upper quadrant pain  Patient's pain left upper quadrant to worse at this time  Patient with some loose stools  The patient without any vomiting but does have nausea  Patient does notice sweats intermittently  Some slight improvement with Librax as well as Carafate  The following portions of the patient's history were reviewed and updated as appropriate: allergies, current medications, past family history, past medical history, past social history, past surgical history and problem list       Review of Systems   Constitutional: Negative  HENT: Negative  Eyes: Negative  Respiratory: Negative  Cardiovascular: Negative  Gastrointestinal: Positive for abdominal pain and nausea  Endocrine: Negative  Genitourinary: Negative  Musculoskeletal: Negative  Skin: Negative  Allergic/Immunologic: Negative  Neurological: Negative  Hematological: Negative  Psychiatric/Behavioral: Negative  Objective:               /100 (BP Location: Right arm, Patient Position: Sitting, Cuff Size: Adult)   Temp 98 °F (36 7 °C) (Tympanic)   Ht 5' 4" (1 626 m)   Wt 104 kg (230 lb)   LMP  (LMP Unknown)   BMI 39 48 kg/m²          Physical Exam  Vitals and nursing note reviewed     Constitutional: General: She is not in acute distress  Appearance: Normal appearance  She is not ill-appearing, toxic-appearing or diaphoretic  HENT:      Head: Normocephalic and atraumatic  Right Ear: Tympanic membrane, ear canal and external ear normal  There is no impacted cerumen  Left Ear: Tympanic membrane, ear canal and external ear normal  There is no impacted cerumen  Nose: Nose normal  No congestion or rhinorrhea  Mouth/Throat:      Mouth: Mucous membranes are moist       Pharynx: No oropharyngeal exudate or posterior oropharyngeal erythema  Eyes:      General: No scleral icterus  Right eye: No discharge  Left eye: No discharge  Extraocular Movements: Extraocular movements intact  Conjunctiva/sclera: Conjunctivae normal       Pupils: Pupils are equal, round, and reactive to light  Neck:      Vascular: No carotid bruit  Cardiovascular:      Rate and Rhythm: Normal rate and regular rhythm  Pulses: Normal pulses  Heart sounds: Normal heart sounds  No murmur heard  No friction rub  No gallop  Pulmonary:      Effort: Pulmonary effort is normal  No respiratory distress  Breath sounds: Normal breath sounds  No stridor  No wheezing, rhonchi or rales  Chest:      Chest wall: No tenderness  Abdominal:      General: Abdomen is flat  Bowel sounds are normal  There is no distension  Palpations: Abdomen is soft  Tenderness: There is abdominal tenderness  There is no guarding or rebound  Musculoskeletal:         General: No swelling, tenderness, deformity or signs of injury  Normal range of motion  Cervical back: Normal range of motion and neck supple  No rigidity  No muscular tenderness  Right lower leg: No edema  Left lower leg: No edema  Lymphadenopathy:      Cervical: No cervical adenopathy  Skin:     General: Skin is warm and dry  Capillary Refill: Capillary refill takes less than 2 seconds        Coloration: Skin is not jaundiced  Findings: No bruising, erythema, lesion or rash  Neurological:      Mental Status: She is alert and oriented to person, place, and time  Mental status is at baseline  Cranial Nerves: No cranial nerve deficit  Sensory: No sensory deficit  Motor: No weakness  Coordination: Coordination normal       Gait: Gait normal    Psychiatric:         Mood and Affect: Mood normal          Behavior: Behavior normal          Thought Content:  Thought content normal          Judgment: Judgment normal

## 2021-08-11 ENCOUNTER — HOSPITAL ENCOUNTER (OUTPATIENT)
Dept: ULTRASOUND IMAGING | Facility: HOSPITAL | Age: 61
Discharge: HOME | End: 2021-08-11
Payer: COMMERCIAL

## 2021-08-11 DIAGNOSIS — G89.4 CHRONIC PAIN DISORDER: ICD-10-CM

## 2021-08-11 DIAGNOSIS — R10.9 RIGHT FLANK PAIN: ICD-10-CM

## 2021-08-11 DIAGNOSIS — K57.92 DIVERTICULITIS: ICD-10-CM

## 2021-08-11 PROCEDURE — 76856 US EXAM PELVIC COMPLETE: CPT

## 2021-08-11 PROCEDURE — 76830 TRANSVAGINAL US NON-OB: CPT

## 2021-08-12 ENCOUNTER — RA CDI HCC (OUTPATIENT)
Dept: OTHER | Facility: HOSPITAL | Age: 61
End: 2021-08-12

## 2021-08-12 NOTE — PROGRESS NOTES
[facility]  coding opportunities       Chart reviewed, no opportunity found: CHART REVIEWED, NO OPPORTUNITY FOUND                        Patients insurance company: [employer] (Medicare Advantage and Commercial)

## 2021-08-13 ENCOUNTER — TELEPHONE (OUTPATIENT)
Dept: FAMILY MEDICINE CLINIC | Facility: CLINIC | Age: 61
End: 2021-08-13

## 2021-08-17 ENCOUNTER — TELEPHONE (OUTPATIENT)
Dept: FAMILY MEDICINE CLINIC | Facility: CLINIC | Age: 61
End: 2021-08-17

## 2021-08-17 NOTE — TELEPHONE ENCOUNTER
Spoke with pt, she is going to go to ED, it maybe the fastest way to get into GI and I explained that they can do all the testing at the same time    She is very reluctant to go but also knows this will be the fastest way to find answers to her abdominal pains and constipation

## 2021-08-17 NOTE — TELEPHONE ENCOUNTER
Call patient  Insurance not covering CT scan  Suggest referral to GI    Also consider going to ER if stools are pencil thin and there  is a consideration for obstruction

## 2021-08-17 NOTE — TELEPHONE ENCOUNTER
Patient was told that insurance will not cover CT scan without peer to peer review  Patient states that her stools are "pencil thin, " but denies any blood in stool  She cannot eat solid food, only soft food due to pain when digesting  She states that it feels like there is a blockage  Please advise

## 2021-08-19 ENCOUNTER — TELEPHONE (OUTPATIENT)
Dept: FAMILY MEDICINE CLINIC | Facility: CLINIC | Age: 61
End: 2021-08-19

## 2021-08-19 NOTE — TELEPHONE ENCOUNTER
Patient called from hospital, wants to thank you for all your help  She is going for surgery this afternoon to remove clot  I told patient we are glad she is getting care she needs and she is in our thoughts

## 2021-08-27 ENCOUNTER — TELEPHONE (OUTPATIENT)
Dept: SLEEP CENTER | Facility: CLINIC | Age: 61
End: 2021-08-27

## 2021-08-27 DIAGNOSIS — F41.9 ANXIETY AND DEPRESSION: ICD-10-CM

## 2021-08-27 DIAGNOSIS — G47.33 OSA (OBSTRUCTIVE SLEEP APNEA): Primary | ICD-10-CM

## 2021-08-27 DIAGNOSIS — F32.A ANXIETY AND DEPRESSION: ICD-10-CM

## 2021-08-27 RX ORDER — LORAZEPAM 0.5 MG/1
0.5 TABLET ORAL EVERY 8 HOURS PRN
Qty: 90 TABLET | Refills: 5 | Status: SHIPPED | OUTPATIENT
Start: 2021-08-27 | End: 2022-02-10 | Stop reason: SDUPTHER

## 2021-08-27 NOTE — Clinical Note
Patient called my office (in tears) because needs a pressure change on her machine and is unable to get an appointment  Let and no I have put in a prescription for pressure change

## 2021-08-27 NOTE — TELEPHONE ENCOUNTER
----- Message from Emy Cheng MD sent at 8/27/2021 11:37 AM EDT -----  Patient called my office (in tears) because needs a pressure change on her machine and is unable to get an appointment  Let and no I have put in a prescription for pressure change

## 2021-08-30 ENCOUNTER — OFFICE VISIT (OUTPATIENT)
Dept: FAMILY MEDICINE CLINIC | Facility: CLINIC | Age: 61
End: 2021-08-30
Payer: COMMERCIAL

## 2021-08-30 ENCOUNTER — TELEPHONE (OUTPATIENT)
Dept: SLEEP CENTER | Facility: CLINIC | Age: 61
End: 2021-08-30

## 2021-08-30 VITALS
WEIGHT: 226 LBS | BODY MASS INDEX: 38.58 KG/M2 | HEIGHT: 64 IN | SYSTOLIC BLOOD PRESSURE: 172 MMHG | TEMPERATURE: 97.2 F | DIASTOLIC BLOOD PRESSURE: 102 MMHG

## 2021-08-30 DIAGNOSIS — K21.9 GASTROESOPHAGEAL REFLUX DISEASE WITHOUT ESOPHAGITIS: ICD-10-CM

## 2021-08-30 DIAGNOSIS — M79.602 LEFT ARM PAIN: ICD-10-CM

## 2021-08-30 DIAGNOSIS — I11.0 HYPERTENSIVE HEART DISEASE WITH CONGESTIVE HEART FAILURE, UNSPECIFIED HEART FAILURE TYPE (HCC): ICD-10-CM

## 2021-08-30 DIAGNOSIS — I10 ESSENTIAL HYPERTENSION: ICD-10-CM

## 2021-08-30 DIAGNOSIS — I48.0 PAROXYSMAL ATRIAL FIBRILLATION (HCC): ICD-10-CM

## 2021-08-30 DIAGNOSIS — E78.2 MIXED HYPERLIPIDEMIA: ICD-10-CM

## 2021-08-30 DIAGNOSIS — J42 CHRONIC BRONCHITIS, UNSPECIFIED CHRONIC BRONCHITIS TYPE (HCC): ICD-10-CM

## 2021-08-30 DIAGNOSIS — R09.02 HYPOXIA: ICD-10-CM

## 2021-08-30 DIAGNOSIS — K55.069 MESENTERIC ARTERY THROMBOSIS (HCC): Primary | ICD-10-CM

## 2021-08-30 PROCEDURE — 99496 TRANSJ CARE MGMT HIGH F2F 7D: CPT | Performed by: FAMILY MEDICINE

## 2021-08-30 RX ORDER — NALOXONE HYDROCHLORIDE 4 MG/.1ML
SPRAY NASAL
Qty: 1 EACH | Refills: 1 | Status: SHIPPED | OUTPATIENT
Start: 2021-08-30 | End: 2021-09-03

## 2021-08-30 RX ORDER — FUROSEMIDE 40 MG/1
40 TABLET ORAL DAILY
COMMUNITY
Start: 2021-08-26 | End: 2021-09-22 | Stop reason: SDUPTHER

## 2021-08-30 RX ORDER — CLOPIDOGREL BISULFATE 75 MG/1
75 TABLET ORAL DAILY
COMMUNITY
Start: 2021-08-20 | End: 2022-01-26 | Stop reason: CLARIF

## 2021-08-30 RX ORDER — SPIRONOLACTONE 25 MG/1
25 TABLET ORAL DAILY
COMMUNITY
Start: 2021-08-25 | End: 2021-09-22 | Stop reason: SDUPTHER

## 2021-08-30 RX ORDER — TRAMADOL HYDROCHLORIDE 50 MG/1
50 TABLET ORAL
Qty: 15 TABLET | Refills: 0 | Status: SHIPPED | OUTPATIENT
Start: 2021-08-30 | End: 2021-11-01 | Stop reason: SDUPTHER

## 2021-08-30 RX ORDER — NIFEDIPINE 30 MG/1
30 TABLET, EXTENDED RELEASE ORAL DAILY
Qty: 30 TABLET | Refills: 2 | Status: SHIPPED | OUTPATIENT
Start: 2021-08-30 | End: 2021-11-01

## 2021-08-30 NOTE — TELEPHONE ENCOUNTER
Patient left voice message stating [facility] pharmacy never received the order for her replacement CPAP  She would like the order faxed to [phone]  Order and sleep study was faxed on 7/9/21  Refaxed order for CPAP and sleep study to number provided  Left voice message for patient and made aware

## 2021-08-30 NOTE — PROGRESS NOTES
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[facility] )    Hypertensive heart disease with congestive heart failure, unspecified heart failure type [facility])  -     Ambulatory referral to Cardiology; Future  -     NIFEdipine (PROCARDIA XL) 30 mg 24 hr tablet; Take 1 tablet (30 mg total) by mouth daily    Paroxysmal atrial fibrillation ([facility] )  -     Ambulatory referral to Cardiology; Future  -     NIFEdipine (PROCARDIA XL) 30 mg 24 hr tablet; Take 1 tablet (30 mg total) by mouth daily    Gastroesophageal reflux disease without esophagitis    Hypoxia    Essential hypertension  -     NIFEdipine (PROCARDIA XL) 30 mg 24 hr tablet; Take 1 tablet (30 mg total) by mouth daily    Mixed hyperlipidemia    Chronic bronchitis, unspecified chronic bronchitis type (HCC)    Left arm pain  -     traMADol (ULTRAM) 50 mg tablet; Take 1 tablet (50 mg total) by mouth daily at bedtime as needed for moderate pain or severe pain  -     naloxone (NARCAN) 4 mg/0 1 mL nasal spray; Administer 1 spray into a nostril  If no response after 2-3 minutes, give another dose in the other nostril using a new spray  Other orders  -     clopidogrel (PLAVIX) 75 mg tablet; Take 75 mg by mouth daily  -     furosemide (LASIX) 40 mg tablet; Take 40 mg by mouth daily  -     spironolactone (ALDACTONE) 25 mg tablet; Take 25 mg by mouth daily            Subjective:        Patient ID: Coty Burleson is a 59 y o  female         Patient is here status post hospitalization from the 18th through the 25th for abdominal pain nausea vomiting left lower quadrant pain which patient ultimately has diagnosis mesenteric artery stenosis/ thrombosis  Patient had vascular surgery consult as well as and Interventional Radiology  On August 20th patient did have stent placed by Interventional Radiology  Patient was also placed on heparin prior to stent and ultimately placed on Plavix and will be on Plavix for at least 60 days and then transition aspirin  Patient's chest x-ray was negative  Patient did have CT scan of the abdomen pelvis both pre and post stent which shows tetanus pain post placement  Patient other diagnosis include hypoxia with respiratory failure dysuria Afib with rapid ventricular response  Patient on Lopressor as well as Eliquis for this  Other diagnosis include hypertension GERD and CHF  Patient given Lasix IV as well as Lasix 40 mg daily as an outpatient  Patient is on Breo daily  Pulse ox on the 25th was 93%  Patient is to follow-up with vascular surgery and pulmonology  Patient with some left arm discomfort status post stent placement through left arm  No palpitations or chest pain  Patient without any shortness of breath this time  Patient with interstitial cystitis and pain associated with urination  Urine culture negative in-hospital  Patient using Tylenol for arm pain  The following portions of the patient's history were reviewed and updated as appropriate: allergies, current medications, past family history, past medical history, past social history, past surgical history and problem list       Review of Systems   Constitutional: Negative  HENT: Negative  Eyes: Negative  Respiratory: Negative  Cardiovascular: Negative  Gastrointestinal: Negative  Endocrine: Negative  Genitourinary: Positive for dysuria  Musculoskeletal: Positive for myalgias  Skin: Negative  Allergic/Immunologic: Negative  Neurological: Positive for numbness  Hematological: Negative  Psychiatric/Behavioral: Negative  Objective:      BMI Counseling: Body mass index is 38 79 kg/m²  The BMI is above normal  Nutrition recommendations include decreasing portion sizes  Exercise recommendations include exercising 3-5 times per week  BP (!) 172/102 (BP Location: Right arm, Patient Position: Sitting, Cuff Size: Adult)   Temp (!) 97 2 °F (36 2 °C) (Tympanic)   Ht 5' 4" (1 626 m)   Wt 103 kg (226 lb)   LMP  (LMP Unknown)   BMI 38 79 kg/m²          Physical Exam  Vitals and nursing note reviewed  Constitutional:       General: She is not in acute distress  Appearance: Normal appearance  She is not ill-appearing, toxic-appearing or diaphoretic  HENT:      Head: Normocephalic and atraumatic  Right Ear: Tympanic membrane, ear canal and external ear normal  There is no impacted cerumen  Left Ear: Tympanic membrane, ear canal and external ear normal  There is no impacted cerumen  Nose: Nose normal  No congestion or rhinorrhea  Mouth/Throat:      Mouth: Mucous membranes are moist       Pharynx: No oropharyngeal exudate or posterior oropharyngeal erythema  Eyes:      General: No scleral icterus  Right eye: No discharge  Left eye: No discharge  Extraocular Movements: Extraocular movements intact  Conjunctiva/sclera: Conjunctivae normal       Pupils: Pupils are equal, round, and reactive to light  Neck:      Vascular: No carotid bruit  Cardiovascular:      Rate and Rhythm: Normal rate and regular rhythm  Pulses: Normal pulses  Heart sounds: Normal heart sounds  No murmur heard  No friction rub  No gallop  Pulmonary:      Effort: Pulmonary effort is normal  No respiratory distress  Breath sounds: Normal breath sounds  No stridor  No wheezing, rhonchi or rales  Chest:      Chest wall: No tenderness     Abdominal:      General: Abdomen is flat  Bowel sounds are normal  There is no distension  Palpations: Abdomen is soft  Tenderness: There is no abdominal tenderness  There is no guarding or rebound  Musculoskeletal:         General: No swelling, tenderness, deformity or signs of injury  Normal range of motion  Cervical back: Normal range of motion and neck supple  No rigidity  No muscular tenderness  Right lower leg: No edema  Left lower leg: No edema  Lymphadenopathy:      Cervical: No cervical adenopathy  Skin:     General: Skin is warm and dry  Capillary Refill: Capillary refill takes less than 2 seconds  Coloration: Skin is not jaundiced  Findings: No bruising, erythema, lesion or rash  Neurological:      Mental Status: She is alert and oriented to person, place, and time  Cranial Nerves: No cranial nerve deficit  Sensory: Sensory deficit present  Motor: No weakness  Coordination: Coordination normal       Gait: Gait normal       Comments: Some  Numbness 1st and 5th fingers on theLeft   Psychiatric:         Mood and Affect: Mood normal          Behavior: Behavior normal          Thought Content:  Thought content normal          Judgment: Judgment normal

## 2021-08-31 NOTE — TELEPHONE ENCOUNTER
Spoke to patient, she thinks the pressure is wrong on her machine  She just received a new machine  I spoke to Emre Graves at [facility], she is going to have the patient bring her machine in so she can double check that it is set for 8-12cm H2O  Emre Graves will fax a compliance also

## 2021-09-03 ENCOUNTER — OFFICE VISIT (OUTPATIENT)
Dept: SLEEP CENTER | Facility: CLINIC | Age: 61
End: 2021-09-03
Payer: COMMERCIAL

## 2021-09-03 VITALS
DIASTOLIC BLOOD PRESSURE: 68 MMHG | BODY MASS INDEX: 38.65 KG/M2 | HEIGHT: 64 IN | WEIGHT: 226.4 LBS | SYSTOLIC BLOOD PRESSURE: 124 MMHG

## 2021-09-03 DIAGNOSIS — K21.9 GASTROESOPHAGEAL REFLUX DISEASE WITHOUT ESOPHAGITIS: ICD-10-CM

## 2021-09-03 DIAGNOSIS — G25.81 RLS (RESTLESS LEGS SYNDROME): ICD-10-CM

## 2021-09-03 DIAGNOSIS — R06.00 DOE (DYSPNEA ON EXERTION): ICD-10-CM

## 2021-09-03 DIAGNOSIS — G47.33 OBSTRUCTIVE SLEEP APNEA SYNDROME: Primary | ICD-10-CM

## 2021-09-03 DIAGNOSIS — J30.1 SEASONAL ALLERGIC RHINITIS DUE TO POLLEN: ICD-10-CM

## 2021-09-03 DIAGNOSIS — J45.909 ASTHMA DUE TO SEASONAL ALLERGIES: ICD-10-CM

## 2021-09-03 DIAGNOSIS — R40.0 DAYTIME SLEEPINESS: ICD-10-CM

## 2021-09-03 DIAGNOSIS — I48.0 PAROXYSMAL ATRIAL FIBRILLATION (HCC): ICD-10-CM

## 2021-09-03 DIAGNOSIS — E66.9 OBESITY (BMI 30-39.9): ICD-10-CM

## 2021-09-03 DIAGNOSIS — G47.09 OTHER INSOMNIA: ICD-10-CM

## 2021-09-03 DIAGNOSIS — R53.82 CHRONIC FATIGUE: ICD-10-CM

## 2021-09-03 DIAGNOSIS — R00.2 PALPITATION: ICD-10-CM

## 2021-09-03 DIAGNOSIS — R68.2 DRY MOUTH: ICD-10-CM

## 2021-09-03 PROCEDURE — 3008F BODY MASS INDEX DOCD: CPT | Performed by: FAMILY MEDICINE

## 2021-09-03 PROCEDURE — 99214 OFFICE O/P EST MOD 30 MIN: CPT | Performed by: INTERNAL MEDICINE

## 2021-09-03 RX ORDER — POLYETHYLENE GLYCOL 3350 17 G/17G
17 POWDER, FOR SOLUTION ORAL AS NEEDED
COMMUNITY
Start: 2021-08-26 | End: 2021-09-09

## 2021-09-03 RX ORDER — IPRATROPIUM BROMIDE 42 UG/1
1-2 SPRAY, METERED NASAL 2 TIMES DAILY PRN
COMMUNITY
Start: 2021-08-13 | End: 2021-11-01

## 2021-09-03 RX ORDER — PHENAZOPYRIDINE HYDROCHLORIDE 200 MG/1
TABLET, FILM COATED ORAL
COMMUNITY
End: 2021-10-07 | Stop reason: SDUPTHER

## 2021-09-03 RX ORDER — PHENAZOPYRIDINE HYDROCHLORIDE 200 MG/1
TABLET, FILM COATED ORAL
COMMUNITY
Start: 2021-09-01 | End: 2021-09-22 | Stop reason: SDUPTHER

## 2021-09-03 NOTE — PROGRESS NOTES
Follow-Up Note - Sleep Center   [place]  59 y o  female  DXY:[birth year]  KKE:[id number]  DOS:9/3/2021    CC: I saw this patient for follow-up in clinic today for Sleep disordered breathing, Coexisting Sleep and Medical Problems  A sleep study to titrate Positive airway pressure (PAP) therapy was undertaken in April of this year  Patient is here to review results and to adjust therapy  Initially she got a [employer] 1 machine but recently changed to a [employer] machine because of the recall      The study demonstrated sleep disordered breathing was successfully remediated with PAP at 8 cm H2O  She was observed during stage REM but not while supine There was severe periodic limb movements of sleep and she had difficulty maintaining sleep  Sleep efficiency reduced at 63 8%  Home sleep testing in December of 2020 demonstrated: DENZEL (respiratory event index of) 14 /hour  The oxygen desaturation index was 15 5 per hour  Minimum oxygen saturation 67%  and 12 9% of total sleep time was spent with saturations less than 90%  The snore index was 39 1%  Auto titrating Pap was initiated  PFSH, Problem List, Medications & Allergies were reviewed in EMR  Interval changes: none reported  She  has a past medical history of A-fib ([facility] ) (03/2020), Arthritis, Asthma, Colon polyp, GERD (gastroesophageal reflux disease), Heart murmur, Hives, Hyperlipidemia, Hypertension, Infertility, female, Migraine, Palpitations, Psychiatric disorder, and Wears glasses      She has a current medication list which includes the following prescription(s): acetaminophen, ascorbic acid, butalbital-acetaminophen-caffeine, carafate, cetirizine, chlordiazepoxide-clidinium, chlorhexidine, cholecalciferol, clopidogrel, dexilant, diltiazem, elderberry, eliquis, epinastine hcl, evolocumab, fexofenadine, fluticasone-vilanterol, furosemide, hydrocortisone, ipratropium, lorazepam, metoprolol succinate, montelukast, multiple vitamin, nystatin, omeprazole, phenazopyridine, polyethylene glycol, spironolactone, sucralfate, terconazole, tramadol, triamcinolone, zinc, zolpidem, cholecalciferol, nifedipine, and phenazopyridine, and the following Facility-Administered Medications: cyanocobalamin  PHYSIOLOGICAL DATA REVIEW AND INTERPRETATION:   using PAP > 4 hours/night 100%  Estimated DENZEL 0 6/hour with pressure of 11 9cm H2O @90th percentile; Compliance: excellent; Sleep disordered breathing:stable & within target range; Patient has not been using ozone based or other devices to sanitize the machine  SUBJECTIVE: Regarding use of PAP, Elizabeth Anna reports:   · She is experiencing no  adverse effects: But still feels pressure is insufficient ; has not noticed any fibres or foreign material in air line  · She is benefiting from use: sleeping better   · Restless leg symptoms occur 2 to 3 times a week  Sleep Routine: Elizabeth Anna reports sleep has improved and now getting 5-7 hrs sleep and is using Ambien very infrequently; she has difficulty initiating sleep, but not maintaining sleep   She arises spontaneously and feels more refreshed since on Rx  Elizabeth Anna reports significantly improved  Excessive Daytime Sleepiness, but may nap occasionally and rated herself at Total score: 4 /24 on the [place] Sleepiness Scale  Habits: reports that she quit smoking about 2 years ago  She has a 5 00 pack-year smoking history  She has never used smokeless tobacco ,  reports no history of alcohol use ,  reports no history of drug use , Caffeine use: excessive until around 6:00 p m , Exercise routine: none   ROS: as attached  Significant for weight has been stable  She reported no nasal symptoms  She is still in AFib and continues to report shortness of breath as a result  She feels asthma control has improved       EXAM: /68   Ht 5' 4" (1 626 m)   Wt 103 kg (226 lb 6 4 oz)   LMP  (LMP Unknown)   BMI 38 86 kg/m²     Patient is well groomed; well appearing  H&N: EOMI; NC/AT:no facial pressure marks, no rashes  Skin/Extrem: col & hydration normal; no edema  Psych: cooperativeand in no distress  Mental state:appears normal   Resp: Respiratory effort is normal  CNS: Alert, orientated, clear & coherent speech  Physical findings otherwise essentially unchanged from previous  IMPRESSION: Problem List Items & Comorbidities Addressed this Visit    1  Obstructive sleep apnea syndrome  Ambulatory referral to Sleep Medicine    PAP DME Pressure Change    PAP DME Resupply/Reorder   2  Other insomnia     3  RLS (restless legs syndrome)     4  Daytime sleepiness     5  Dry mouth     6  Paroxysmal atrial fibrillation (HCC)     7  Seasonal allergic rhinitis due to pollen     8  Asthma due to seasonal allergies     9  Chronic fatigue     10  Obesity (BMI 30-39 9)     11  Gastroesophageal reflux disease without esophagitis     12  WELLINGTON (dyspnea on exertion)     13  Palpitations and chest pain  PLAN:  1  I reviewed results of prior studies and physiologic data with the patient  I discussed treatment options with risks and benefits  2  Treatment with  PAP is medically necessary and Juanita Solano is agreable to continue use  3  Care of equipment, methods to improve comfort using PAP and importance of compliance with therapy were discussed  4  Pressure setting: change 10-14 cmH2O     5  Rx provided to replace  supplies and Care coordinated with DME provider  6  Multi component Cognitive behavioral therapy for Insomnia undertaken - Sleep Restriction, Stimulus control, Relaxation techniques and Sleep hygiene were discussed  7  She is asking for a prescription for Restific for RLS  If symptoms persists, consideration may be given to Dipyramidole (that has been shown to be effective for restless leg symptoms) in place of or in addition to plavix   8  Discussed strategies for weight reduction      9  Follow-up is advised in 1 year or sooner if needed to monitor progress, compliance and to adjust therapy  Thank you for allowing me to participate in the care of this patient  Sincerely,    Authenticated electronically by Martha Pan MD on 46/85/15   Board Certified Specialist     Portions of the record may have been created with voice recognition software  Occasional wrong word or "sound a like" substitutions may have occurred due to the inherent limitations of voice recognition software  There may also be notations and random deletions of words or characters from malfunctioning software  Read the chart carefully and recognize, using context, where substitutions/deletions have occurred

## 2021-09-03 NOTE — PATIENT INSTRUCTIONS

## 2021-09-03 NOTE — PROGRESS NOTES
Review of Systems      Genitourinary none   Cardiology palpitations/fluttering feeling in the chest and ankle/leg swelling   Gastrointestinal frequent heartburn/acid reflux and abdominal pain or cramping that disturb sleep    Neurology frequent headaches, need to move extremities, muscle weakness, numbness/tingling of an extremity, forgetfulness, poor concentration or confusion,  and difficulty with memory   Constitutional fatigue, excessive sweating at night and weight change   Integumentary itching   Psychiatry anxiety   Musculoskeletal joint pain, muscle aches and legs twitching/jerking   Pulmonary shortness of breath with activity, chest tightness, wheezing and snoring   ENT none   Endocrine none   Hematological none

## 2021-09-07 ENCOUNTER — TELEPHONE (OUTPATIENT)
Dept: SLEEP CENTER | Facility: CLINIC | Age: 61
End: 2021-09-07

## 2021-09-07 ENCOUNTER — TELEPHONE (OUTPATIENT)
Dept: OTHER | Facility: OTHER | Age: 61
End: 2021-09-07

## 2021-09-07 NOTE — TELEPHONE ENCOUNTER
Patient called and stated that she was prescribed a medication by Dr Elizabeth Pantoja last Monday, she stated she took it and her legs looked like elephant legs  She stated she stopped taking it but her blood pressure is high in the mean time; 178/98 and that was at 130, she stated there is slight shortness of breath as well  Please advise

## 2021-09-08 NOTE — TELEPHONE ENCOUNTER
Call patient  Stop nifedipine  Patient to try taking diltiazem daily  Patient to check blood pressures daily    Call back next week with blood pressure readings

## 2021-09-10 ENCOUNTER — TELEPHONE (OUTPATIENT)
Dept: FAMILY MEDICINE CLINIC | Facility: CLINIC | Age: 61
End: 2021-09-10

## 2021-09-10 DIAGNOSIS — N39.0 URINARY TRACT INFECTION WITHOUT HEMATURIA, SITE UNSPECIFIED: Primary | ICD-10-CM

## 2021-09-10 RX ORDER — CIPROFLOXACIN 500 MG/1
500 TABLET, FILM COATED ORAL EVERY 12 HOURS SCHEDULED
Qty: 14 TABLET | Refills: 0 | Status: SHIPPED | OUTPATIENT
Start: 2021-09-10 | End: 2021-09-17

## 2021-09-14 DIAGNOSIS — I48.0 PAROXYSMAL ATRIAL FIBRILLATION (HCC): ICD-10-CM

## 2021-09-14 RX ORDER — APIXABAN 5 MG/1
TABLET, FILM COATED ORAL
Qty: 180 TABLET | Refills: 0 | Status: SHIPPED | OUTPATIENT
Start: 2021-09-14 | End: 2021-12-22

## 2021-09-22 ENCOUNTER — OFFICE VISIT (OUTPATIENT)
Dept: FAMILY MEDICINE CLINIC | Facility: CLINIC | Age: 61
End: 2021-09-22
Payer: COMMERCIAL

## 2021-09-22 VITALS
DIASTOLIC BLOOD PRESSURE: 94 MMHG | HEIGHT: 64 IN | WEIGHT: 222.4 LBS | BODY MASS INDEX: 37.97 KG/M2 | SYSTOLIC BLOOD PRESSURE: 142 MMHG

## 2021-09-22 DIAGNOSIS — E66.01 MORBID OBESITY (HCC): ICD-10-CM

## 2021-09-22 DIAGNOSIS — N30.90 CYSTITIS: ICD-10-CM

## 2021-09-22 DIAGNOSIS — I10 BENIGN ESSENTIAL HYPERTENSION: ICD-10-CM

## 2021-09-22 DIAGNOSIS — E53.8 VITAMIN B12 DEFICIENCY: Primary | ICD-10-CM

## 2021-09-22 DIAGNOSIS — E78.2 MIXED HYPERLIPIDEMIA: ICD-10-CM

## 2021-09-22 DIAGNOSIS — I10 ESSENTIAL HYPERTENSION: ICD-10-CM

## 2021-09-22 PROCEDURE — 1036F TOBACCO NON-USER: CPT | Performed by: FAMILY MEDICINE

## 2021-09-22 PROCEDURE — 3008F BODY MASS INDEX DOCD: CPT | Performed by: FAMILY MEDICINE

## 2021-09-22 PROCEDURE — 99214 OFFICE O/P EST MOD 30 MIN: CPT | Performed by: FAMILY MEDICINE

## 2021-09-22 RX ORDER — PHENAZOPYRIDINE HYDROCHLORIDE 200 MG/1
200 TABLET, FILM COATED ORAL
Qty: 10 TABLET | Refills: 0 | Status: SHIPPED | OUTPATIENT
Start: 2021-09-22 | End: 2021-09-24

## 2021-09-22 RX ORDER — METOPROLOL SUCCINATE 100 MG/1
TABLET, EXTENDED RELEASE ORAL
Qty: 180 TABLET | Refills: 0 | Status: SHIPPED | OUTPATIENT
Start: 2021-09-22 | End: 2021-09-22 | Stop reason: SDUPTHER

## 2021-09-22 RX ORDER — FUROSEMIDE 40 MG/1
40 TABLET ORAL DAILY
Qty: 30 TABLET | Refills: 11 | Status: SHIPPED | OUTPATIENT
Start: 2021-09-22 | End: 2021-12-03 | Stop reason: SDUPTHER

## 2021-09-22 RX ORDER — DILTIAZEM HYDROCHLORIDE 120 MG/1
120 CAPSULE, EXTENDED RELEASE ORAL DAILY
Qty: 30 CAPSULE | Refills: 11 | Status: SHIPPED | OUTPATIENT
Start: 2021-09-22 | End: 2021-10-15 | Stop reason: SDUPTHER

## 2021-09-22 RX ORDER — METOPROLOL SUCCINATE 100 MG/1
100 TABLET, EXTENDED RELEASE ORAL 2 TIMES DAILY
Qty: 180 TABLET | Refills: 0 | Status: SHIPPED | OUTPATIENT
Start: 2021-09-22 | End: 2021-12-20

## 2021-09-22 RX ORDER — MAGNESIUM 200 MG
TABLET ORAL ONCE
Status: DISCONTINUED | OUTPATIENT
Start: 2021-09-22 | End: 2022-04-07 | Stop reason: ALTCHOICE

## 2021-09-22 RX ORDER — SPIRONOLACTONE 25 MG/1
25 TABLET ORAL DAILY
Qty: 90 TABLET | Refills: 0 | Status: SHIPPED | OUTPATIENT
Start: 2021-09-22 | End: 2021-09-23

## 2021-09-22 RX ORDER — AMPICILLIN 500 MG/1
500 CAPSULE ORAL 4 TIMES DAILY
Qty: 28 CAPSULE | Refills: 0 | Status: SHIPPED | OUTPATIENT
Start: 2021-09-22 | End: 2021-09-29

## 2021-09-22 RX ADMIN — CYANOCOBALAMIN 1000 MCG: 1000 INJECTION INTRAMUSCULAR; SUBCUTANEOUS at 13:22

## 2021-09-22 NOTE — PROGRESS NOTES
Assessment/Plan:  Patient go for laboratory studies  Patient see Cardiology in October  Patient use ampicillin for cystitis  Will recheck blood pressure at follow-up visit  Refills given  Patient have laboratory studies done  Follow-up per routine       Diagnoses and all orders for this visit:    Cystitis  -     ampicillin (PRINCIPEN) 500 mg capsule; Take 1 capsule (500 mg total) by mouth 4 (four) times a day for 7 days  -     phenazopyridine (PYRIDIUM) 200 mg tablet; Take 1 tablet (200 mg total) by mouth 3 (three) times a day with meals for 2 days    Benign essential hypertension  -     spironolactone (ALDACTONE) 25 mg tablet; Take 1 tablet (25 mg total) by mouth daily  -     furosemide (LASIX) 40 mg tablet; Take 1 tablet (40 mg total) by mouth daily  -     diltiazem (TIAZAC) 120 MG 24 hr capsule; Take 1 capsule (120 mg total) by mouth daily  -     Comprehensive metabolic panel; Future  -     CBC and differential; Future  -     Lipid panel; Future  -     TSH, 3rd generation with Free T4 reflex; Future    Essential hypertension  -     furosemide (LASIX) 40 mg tablet; Take 1 tablet (40 mg total) by mouth daily  -     diltiazem (TIAZAC) 120 MG 24 hr capsule; Take 1 capsule (120 mg total) by mouth daily  -     metoprolol succinate (TOPROL-XL) 100 mg 24 hr tablet; Take 1 tablet (100 mg total) by mouth 2 (two) times a day  -     Comprehensive metabolic panel; Future  -     CBC and differential; Future  -     Lipid panel; Future  -     TSH, 3rd generation with Free T4 reflex; Future    Mixed hyperlipidemia  -     Comprehensive metabolic panel; Future  -     CBC and differential; Future  -     Lipid panel; Future  -     TSH, 3rd generation with Free T4 reflex; Future    Morbid obesity ([facility] [address] )  -     Comprehensive metabolic panel; Future  -     CBC and differential; Future  -     Lipid panel; Future  -     TSH, 3rd generation with Free T4 reflex; Future            Subjective:        Patient ID: Nia Harris is a 59 y o  female  Patient is here with dysuria and pelvic pain  Patient seen in emergency room and diagnosed with Enterococcus UTI  Patient was given Zyvox blood has had diarrhea after 3rd day and stop medication  Patient needs new antibiotic to red case organism  Labs and records reviewed  Patient with some chills but no fever vomiting  The patient will need refills on chronic conditions listed per        The following portions of the patient's history were reviewed and updated as appropriate: allergies, current medications, past family history, past medical history, past social history, past surgical history and problem list       Review of Systems   Constitutional: Negative  HENT: Negative  Eyes: Negative  Respiratory: Negative  Cardiovascular: Negative  Gastrointestinal: Negative  Endocrine: Negative  Genitourinary: Positive for dysuria  Musculoskeletal: Negative  Skin: Negative  Allergic/Immunologic: Negative  Neurological: Negative  Hematological: Negative  Psychiatric/Behavioral: Negative  Objective:               /94 (BP Location: Right arm, Patient Position: Sitting, Cuff Size: Standard)   Ht 5' 4" (1 626 m)   Wt 101 kg (222 lb 6 4 oz)   LMP  (LMP Unknown)   BMI 38 17 kg/m²          Physical Exam  Vitals and nursing note reviewed  Constitutional:       General: She is not in acute distress  Appearance: Normal appearance  She is not ill-appearing, toxic-appearing or diaphoretic  HENT:      Head: Normocephalic and atraumatic  Right Ear: Tympanic membrane, ear canal and external ear normal  There is no impacted cerumen  Left Ear: Tympanic membrane, ear canal and external ear normal  There is no impacted cerumen  Nose: Nose normal  No congestion or rhinorrhea  Mouth/Throat:      Mouth: Mucous membranes are moist       Pharynx: No oropharyngeal exudate or posterior oropharyngeal erythema     Eyes:      General: No scleral icterus  Right eye: No discharge  Left eye: No discharge  Extraocular Movements: Extraocular movements intact  Conjunctiva/sclera: Conjunctivae normal       Pupils: Pupils are equal, round, and reactive to light  Neck:      Vascular: No carotid bruit  Cardiovascular:      Rate and Rhythm: Normal rate and regular rhythm  Pulses: Normal pulses  Heart sounds: Normal heart sounds  No murmur heard  No friction rub  No gallop  Pulmonary:      Effort: Pulmonary effort is normal  No respiratory distress  Breath sounds: Normal breath sounds  No stridor  No wheezing, rhonchi or rales  Chest:      Chest wall: No tenderness  Abdominal:      General: Abdomen is flat  Bowel sounds are normal  There is no distension  Palpations: Abdomen is soft  Tenderness: There is no abdominal tenderness  There is no guarding or rebound  Musculoskeletal:         General: No swelling, tenderness, deformity or signs of injury  Normal range of motion  Cervical back: Normal range of motion and neck supple  No rigidity  No muscular tenderness  Right lower leg: No edema  Left lower leg: No edema  Lymphadenopathy:      Cervical: No cervical adenopathy  Skin:     General: Skin is warm and dry  Capillary Refill: Capillary refill takes less than 2 seconds  Coloration: Skin is not jaundiced  Findings: No bruising, erythema, lesion or rash  Neurological:      Mental Status: She is alert and oriented to person, place, and time  Mental status is at baseline  Cranial Nerves: No cranial nerve deficit  Sensory: No sensory deficit  Motor: No weakness  Coordination: Coordination normal       Gait: Gait normal    Psychiatric:         Mood and Affect: Mood normal          Behavior: Behavior normal          Thought Content:  Thought content normal          Judgment: Judgment normal

## 2021-09-23 DIAGNOSIS — I10 BENIGN ESSENTIAL HYPERTENSION: ICD-10-CM

## 2021-09-23 RX ORDER — SPIRONOLACTONE 25 MG/1
25 TABLET ORAL 2 TIMES DAILY
Qty: 90 TABLET | Refills: 0 | Status: SHIPPED | OUTPATIENT
Start: 2021-09-23 | End: 2021-09-27 | Stop reason: SDUPTHER

## 2021-09-27 ENCOUNTER — RA CDI HCC (OUTPATIENT)
Dept: OTHER | Facility: HOSPITAL | Age: 61
End: 2021-09-27

## 2021-09-27 DIAGNOSIS — I10 BENIGN ESSENTIAL HYPERTENSION: ICD-10-CM

## 2021-09-27 RX ORDER — SPIRONOLACTONE 25 MG/1
25 TABLET ORAL 2 TIMES DAILY
Qty: 180 TABLET | Refills: 0 | Status: SHIPPED | OUTPATIENT
Start: 2021-09-27 | End: 2022-01-20

## 2021-09-27 NOTE — PROGRESS NOTES
[facility]  coding opportunities       Chart reviewed, no opportunity found: CHART REVIEWED, NO OPPORTUNITY FOUND                        Patients insurance company: [employer] (Medicare Advantage and Commercial)

## 2021-10-01 ENCOUNTER — APPOINTMENT (OUTPATIENT)
Dept: LAB | Facility: HOSPITAL | Age: 61
End: 2021-10-01
Payer: COMMERCIAL

## 2021-10-01 DIAGNOSIS — I10 BENIGN ESSENTIAL HYPERTENSION: ICD-10-CM

## 2021-10-01 DIAGNOSIS — E78.2 MIXED HYPERLIPIDEMIA: ICD-10-CM

## 2021-10-01 DIAGNOSIS — I10 ESSENTIAL HYPERTENSION: ICD-10-CM

## 2021-10-01 DIAGNOSIS — E66.01 MORBID OBESITY (HCC): ICD-10-CM

## 2021-10-01 LAB
ALBUMIN SERPL BCP-MCNC: 4.3 G/DL (ref 3.5–5)
ALP SERPL-CCNC: 122 U/L (ref 46–116)
ALT SERPL W P-5'-P-CCNC: 52 U/L (ref 12–78)
ANION GAP SERPL CALCULATED.3IONS-SCNC: 11 MMOL/L (ref 4–13)
AST SERPL W P-5'-P-CCNC: 32 U/L (ref 5–45)
BASOPHILS # BLD AUTO: 0.03 THOUSANDS/ΜL (ref 0–0.1)
BASOPHILS NFR BLD AUTO: 1 % (ref 0–1)
BILIRUB SERPL-MCNC: 0.39 MG/DL (ref 0.2–1)
BUN SERPL-MCNC: 28 MG/DL (ref 5–25)
CALCIUM SERPL-MCNC: 9.5 MG/DL (ref 8.3–10.1)
CHLORIDE SERPL-SCNC: 103 MMOL/L (ref 100–108)
CHOLEST SERPL-MCNC: 200 MG/DL (ref 50–200)
CO2 SERPL-SCNC: 28 MMOL/L (ref 21–32)
CREAT SERPL-MCNC: 0.87 MG/DL (ref 0.6–1.3)
EOSINOPHIL # BLD AUTO: 0.1 THOUSAND/ΜL (ref 0–0.61)
EOSINOPHIL NFR BLD AUTO: 2 % (ref 0–6)
ERYTHROCYTE [DISTWIDTH] IN BLOOD BY AUTOMATED COUNT: 14.6 % (ref 11.6–15.1)
GFR SERPL CREATININE-BSD FRML MDRD: 71 ML/MIN/1.73SQ M
GLUCOSE P FAST SERPL-MCNC: 136 MG/DL (ref 65–99)
HCT VFR BLD AUTO: 37.9 % (ref 34.8–46.1)
HDLC SERPL-MCNC: 51 MG/DL
HGB BLD-MCNC: 12.3 G/DL (ref 11.5–15.4)
IMM GRANULOCYTES # BLD AUTO: 0.05 THOUSAND/UL (ref 0–0.2)
IMM GRANULOCYTES NFR BLD AUTO: 1 % (ref 0–2)
LDLC SERPL CALC-MCNC: 103 MG/DL (ref 0–100)
LYMPHOCYTES # BLD AUTO: 1.45 THOUSANDS/ΜL (ref 0.6–4.47)
LYMPHOCYTES NFR BLD AUTO: 25 % (ref 14–44)
MCH RBC QN AUTO: 29.1 PG (ref 26.8–34.3)
MCHC RBC AUTO-ENTMCNC: 32.5 G/DL (ref 31.4–37.4)
MCV RBC AUTO: 90 FL (ref 82–98)
MONOCYTES # BLD AUTO: 0.62 THOUSAND/ΜL (ref 0.17–1.22)
MONOCYTES NFR BLD AUTO: 11 % (ref 4–12)
NEUTROPHILS # BLD AUTO: 3.61 THOUSANDS/ΜL (ref 1.85–7.62)
NEUTS SEG NFR BLD AUTO: 60 % (ref 43–75)
NONHDLC SERPL-MCNC: 149 MG/DL
NRBC BLD AUTO-RTO: 0 /100 WBCS
PLATELET # BLD AUTO: 218 THOUSANDS/UL (ref 149–390)
PMV BLD AUTO: 9.6 FL (ref 8.9–12.7)
POTASSIUM SERPL-SCNC: 3.7 MMOL/L (ref 3.5–5.3)
PROT SERPL-MCNC: 7.7 G/DL (ref 6.4–8.2)
RBC # BLD AUTO: 4.22 MILLION/UL (ref 3.81–5.12)
SODIUM SERPL-SCNC: 142 MMOL/L (ref 136–145)
TRIGL SERPL-MCNC: 228 MG/DL
TSH SERPL DL<=0.05 MIU/L-ACNC: 1.95 UIU/ML (ref 0.36–3.74)
WBC # BLD AUTO: 5.86 THOUSAND/UL (ref 4.31–10.16)

## 2021-10-01 PROCEDURE — 84443 ASSAY THYROID STIM HORMONE: CPT

## 2021-10-01 PROCEDURE — 36415 COLL VENOUS BLD VENIPUNCTURE: CPT

## 2021-10-01 PROCEDURE — 80053 COMPREHEN METABOLIC PANEL: CPT

## 2021-10-01 PROCEDURE — 85025 COMPLETE CBC W/AUTO DIFF WBC: CPT

## 2021-10-01 PROCEDURE — 80061 LIPID PANEL: CPT

## 2021-10-04 ENCOUNTER — OFFICE VISIT (OUTPATIENT)
Dept: FAMILY MEDICINE CLINIC | Facility: CLINIC | Age: 61
End: 2021-10-04
Payer: COMMERCIAL

## 2021-10-04 VITALS
BODY MASS INDEX: 37.73 KG/M2 | SYSTOLIC BLOOD PRESSURE: 144 MMHG | OXYGEN SATURATION: 96 % | RESPIRATION RATE: 16 BRPM | WEIGHT: 221 LBS | HEART RATE: 71 BPM | HEIGHT: 64 IN | DIASTOLIC BLOOD PRESSURE: 96 MMHG | TEMPERATURE: 97.2 F

## 2021-10-04 DIAGNOSIS — K55.069 MESENTERIC ARTERY THROMBOSIS (HCC): Primary | ICD-10-CM

## 2021-10-04 DIAGNOSIS — I11.0 HYPERTENSIVE HEART DISEASE WITH CONGESTIVE HEART FAILURE, UNSPECIFIED HEART FAILURE TYPE (HCC): ICD-10-CM

## 2021-10-04 DIAGNOSIS — I48.0 PAROXYSMAL ATRIAL FIBRILLATION (HCC): ICD-10-CM

## 2021-10-04 DIAGNOSIS — N30.10 INTERSTITIAL CYSTITIS: ICD-10-CM

## 2021-10-04 DIAGNOSIS — G43.001 MIGRAINE WITHOUT AURA AND WITH STATUS MIGRAINOSUS, NOT INTRACTABLE: ICD-10-CM

## 2021-10-04 DIAGNOSIS — J42 CHRONIC BRONCHITIS, UNSPECIFIED CHRONIC BRONCHITIS TYPE (HCC): ICD-10-CM

## 2021-10-04 PROCEDURE — 99213 OFFICE O/P EST LOW 20 MIN: CPT | Performed by: FAMILY MEDICINE

## 2021-10-04 RX ORDER — NITROFURANTOIN 25; 75 MG/1; MG/1
100 CAPSULE ORAL 2 TIMES DAILY
Qty: 20 CAPSULE | Refills: 0 | Status: SHIPPED | OUTPATIENT
Start: 2021-10-04 | End: 2021-10-15

## 2021-10-04 RX ORDER — BUTALBITAL, ACETAMINOPHEN AND CAFFEINE 50; 325; 40 MG/1; MG/1; MG/1
1 TABLET ORAL EVERY 4 HOURS PRN
Qty: 30 TABLET | Refills: 0 | Status: CANCELLED | OUTPATIENT
Start: 2021-10-04

## 2021-10-07 DIAGNOSIS — N30.10 INTERSTITIAL CYSTITIS: Primary | ICD-10-CM

## 2021-10-07 RX ORDER — PHENAZOPYRIDINE HYDROCHLORIDE 200 MG/1
200 TABLET, FILM COATED ORAL
Qty: 10 TABLET | Refills: 2 | Status: SHIPPED | OUTPATIENT
Start: 2021-10-07 | End: 2021-10-20

## 2021-10-15 ENCOUNTER — CONSULT (OUTPATIENT)
Dept: CARDIOLOGY CLINIC | Facility: CLINIC | Age: 61
End: 2021-10-15
Payer: COMMERCIAL

## 2021-10-15 VITALS
HEART RATE: 65 BPM | WEIGHT: 222.4 LBS | SYSTOLIC BLOOD PRESSURE: 124 MMHG | DIASTOLIC BLOOD PRESSURE: 80 MMHG | HEIGHT: 64 IN | BODY MASS INDEX: 37.97 KG/M2

## 2021-10-15 DIAGNOSIS — I48.0 PAROXYSMAL ATRIAL FIBRILLATION (HCC): Primary | ICD-10-CM

## 2021-10-15 DIAGNOSIS — I11.0 HYPERTENSIVE HEART DISEASE WITH CONGESTIVE HEART FAILURE, UNSPECIFIED HEART FAILURE TYPE (HCC): ICD-10-CM

## 2021-10-15 DIAGNOSIS — I10 BENIGN ESSENTIAL HYPERTENSION: ICD-10-CM

## 2021-10-15 DIAGNOSIS — I10 ESSENTIAL HYPERTENSION: ICD-10-CM

## 2021-10-15 PROCEDURE — 99215 OFFICE O/P EST HI 40 MIN: CPT | Performed by: INTERNAL MEDICINE

## 2021-10-15 PROCEDURE — 93000 ELECTROCARDIOGRAM COMPLETE: CPT | Performed by: INTERNAL MEDICINE

## 2021-10-15 PROCEDURE — 3008F BODY MASS INDEX DOCD: CPT | Performed by: INTERNAL MEDICINE

## 2021-10-15 PROCEDURE — 1036F TOBACCO NON-USER: CPT | Performed by: INTERNAL MEDICINE

## 2021-10-15 RX ORDER — DILTIAZEM HYDROCHLORIDE 120 MG/1
120 CAPSULE, EXTENDED RELEASE ORAL DAILY
Qty: 90 CAPSULE | Refills: 3
Start: 2021-10-15 | End: 2021-12-08

## 2021-10-26 DIAGNOSIS — K64.1 GRADE II HEMORRHOIDS: Primary | ICD-10-CM

## 2021-10-26 RX ORDER — HYDROCORTISONE 25 MG/G
CREAM TOPICAL
Qty: 28 G | Refills: 5 | Status: SHIPPED | OUTPATIENT
Start: 2021-10-26 | End: 2022-03-31

## 2021-11-01 ENCOUNTER — OFFICE VISIT (OUTPATIENT)
Dept: FAMILY MEDICINE CLINIC | Facility: CLINIC | Age: 61
End: 2021-11-01
Payer: COMMERCIAL

## 2021-11-01 VITALS
DIASTOLIC BLOOD PRESSURE: 100 MMHG | BODY MASS INDEX: 39.27 KG/M2 | TEMPERATURE: 96.9 F | WEIGHT: 230 LBS | SYSTOLIC BLOOD PRESSURE: 178 MMHG | HEIGHT: 64 IN

## 2021-11-01 DIAGNOSIS — N30.10 INTERSTITIAL CYSTITIS: Primary | ICD-10-CM

## 2021-11-01 DIAGNOSIS — R11.2 NON-INTRACTABLE VOMITING WITH NAUSEA, UNSPECIFIED VOMITING TYPE: ICD-10-CM

## 2021-11-01 DIAGNOSIS — I10 ESSENTIAL HYPERTENSION: ICD-10-CM

## 2021-11-01 DIAGNOSIS — M79.602 LEFT ARM PAIN: ICD-10-CM

## 2021-11-01 PROCEDURE — 99214 OFFICE O/P EST MOD 30 MIN: CPT | Performed by: FAMILY MEDICINE

## 2021-11-01 RX ORDER — NITROFURANTOIN 25; 75 MG/1; MG/1
100 CAPSULE ORAL 2 TIMES DAILY
Qty: 14 CAPSULE | Refills: 0 | Status: SHIPPED | OUTPATIENT
Start: 2021-11-01 | End: 2021-11-08

## 2021-11-01 RX ORDER — TRAMADOL HYDROCHLORIDE 50 MG/1
50 TABLET ORAL
Qty: 15 TABLET | Refills: 0 | Status: SHIPPED | OUTPATIENT
Start: 2021-11-01 | End: 2021-11-15 | Stop reason: SDUPTHER

## 2021-11-01 RX ORDER — ONDANSETRON 4 MG/1
4 TABLET, FILM COATED ORAL EVERY 8 HOURS PRN
Qty: 20 TABLET | Refills: 1 | Status: SHIPPED | OUTPATIENT
Start: 2021-11-01 | End: 2021-12-02 | Stop reason: SDUPTHER

## 2021-11-09 ENCOUNTER — TELEPHONE (OUTPATIENT)
Dept: CARDIOLOGY CLINIC | Facility: CLINIC | Age: 61
End: 2021-11-09

## 2021-11-09 DIAGNOSIS — K21.9 GASTROESOPHAGEAL REFLUX DISEASE WITHOUT ESOPHAGITIS: ICD-10-CM

## 2021-11-09 DIAGNOSIS — K25.3 ACUTE GASTRIC ULCER WITHOUT HEMORRHAGE OR PERFORATION: ICD-10-CM

## 2021-11-09 RX ORDER — SUCRALFATE 1 G/10ML
1 SUSPENSION ORAL 4 TIMES DAILY
Qty: 3600 ML | Refills: 1 | Status: SHIPPED | OUTPATIENT
Start: 2021-11-09

## 2021-11-15 ENCOUNTER — OFFICE VISIT (OUTPATIENT)
Dept: FAMILY MEDICINE CLINIC | Facility: CLINIC | Age: 61
End: 2021-11-15
Payer: COMMERCIAL

## 2021-11-15 VITALS
DIASTOLIC BLOOD PRESSURE: 78 MMHG | BODY MASS INDEX: 38.41 KG/M2 | OXYGEN SATURATION: 95 % | SYSTOLIC BLOOD PRESSURE: 130 MMHG | HEIGHT: 64 IN | WEIGHT: 225 LBS | TEMPERATURE: 97.1 F | HEART RATE: 73 BPM

## 2021-11-15 DIAGNOSIS — F11.20 CONTINUOUS OPIOID DEPENDENCE (HCC): ICD-10-CM

## 2021-11-15 DIAGNOSIS — M79.602 LEFT ARM PAIN: ICD-10-CM

## 2021-11-15 DIAGNOSIS — T14.8XXA HEMATOMA: Primary | ICD-10-CM

## 2021-11-15 DIAGNOSIS — K55.069 MESENTERIC ARTERY THROMBOSIS (HCC): ICD-10-CM

## 2021-11-15 PROCEDURE — 3008F BODY MASS INDEX DOCD: CPT | Performed by: FAMILY MEDICINE

## 2021-11-15 PROCEDURE — 99213 OFFICE O/P EST LOW 20 MIN: CPT | Performed by: FAMILY MEDICINE

## 2021-11-15 PROCEDURE — 1036F TOBACCO NON-USER: CPT | Performed by: FAMILY MEDICINE

## 2021-11-15 RX ORDER — TRAMADOL HYDROCHLORIDE 50 MG/1
50 TABLET ORAL
Qty: 20 TABLET | Refills: 0 | Status: SHIPPED | OUTPATIENT
Start: 2021-11-15 | End: 2021-11-29 | Stop reason: SDUPTHER

## 2021-11-23 ENCOUNTER — TELEPHONE (OUTPATIENT)
Dept: FAMILY MEDICINE CLINIC | Facility: CLINIC | Age: 61
End: 2021-11-23

## 2021-11-23 DIAGNOSIS — K55.069 MESENTERIC ARTERY THROMBOSIS (HCC): Primary | ICD-10-CM

## 2021-11-29 ENCOUNTER — HOSPITAL ENCOUNTER (OUTPATIENT)
Dept: CT IMAGING | Facility: HOSPITAL | Age: 61
Discharge: HOME | End: 2021-11-29
Payer: COMMERCIAL

## 2021-11-29 DIAGNOSIS — K55.069 MESENTERIC ARTERY THROMBOSIS (HCC): ICD-10-CM

## 2021-11-29 PROCEDURE — G1004 CDSM NDSC: HCPCS

## 2021-11-29 PROCEDURE — 74177 CT ABD & PELVIS W/CONTRAST: CPT

## 2021-11-29 RX ADMIN — IOHEXOL 100 ML: 350 INJECTION, SOLUTION INTRAVENOUS at 08:38

## 2021-12-02 ENCOUNTER — VBI (OUTPATIENT)
Dept: ADMINISTRATIVE | Facility: OTHER | Age: 61
End: 2021-12-02

## 2021-12-03 DIAGNOSIS — I10 BENIGN ESSENTIAL HYPERTENSION: ICD-10-CM

## 2021-12-03 DIAGNOSIS — I10 ESSENTIAL HYPERTENSION: ICD-10-CM

## 2021-12-03 RX ORDER — FUROSEMIDE 40 MG/1
40 TABLET ORAL DAILY
Qty: 90 TABLET | Refills: 3 | Status: SHIPPED | OUTPATIENT
Start: 2021-12-03 | End: 2022-12-03

## 2021-12-07 ENCOUNTER — TELEPHONE (OUTPATIENT)
Dept: FAMILY MEDICINE CLINIC | Facility: CLINIC | Age: 61
End: 2021-12-07

## 2021-12-07 DIAGNOSIS — M54.16 LUMBAR RADICULOPATHY: Primary | ICD-10-CM

## 2021-12-08 ENCOUNTER — OFFICE VISIT (OUTPATIENT)
Dept: CARDIOLOGY CLINIC | Facility: CLINIC | Age: 61
End: 2021-12-08
Payer: COMMERCIAL

## 2021-12-08 VITALS
OXYGEN SATURATION: 96 % | BODY MASS INDEX: 37.18 KG/M2 | WEIGHT: 217.8 LBS | HEIGHT: 64 IN | RESPIRATION RATE: 16 BRPM | HEART RATE: 96 BPM | SYSTOLIC BLOOD PRESSURE: 136 MMHG | DIASTOLIC BLOOD PRESSURE: 88 MMHG

## 2021-12-08 DIAGNOSIS — R00.2 PALPITATIONS: ICD-10-CM

## 2021-12-08 DIAGNOSIS — I48.0 PAF (PAROXYSMAL ATRIAL FIBRILLATION) (HCC): ICD-10-CM

## 2021-12-08 DIAGNOSIS — M54.16 LUMBAR RADICULOPATHY: ICD-10-CM

## 2021-12-08 DIAGNOSIS — I10 HTN (HYPERTENSION), BENIGN: Primary | ICD-10-CM

## 2021-12-08 PROCEDURE — 99214 OFFICE O/P EST MOD 30 MIN: CPT | Performed by: INTERNAL MEDICINE

## 2021-12-08 RX ORDER — TRAMADOL HYDROCHLORIDE 50 MG/1
50 TABLET ORAL 2 TIMES DAILY PRN
Qty: 60 TABLET | Refills: 0 | Status: SHIPPED | OUTPATIENT
Start: 2021-12-08 | End: 2021-12-08 | Stop reason: SDUPTHER

## 2021-12-08 RX ORDER — NIFEDIPINE 30 MG/1
30 TABLET, EXTENDED RELEASE ORAL DAILY
Qty: 30 TABLET | Refills: 5 | Status: SHIPPED | OUTPATIENT
Start: 2021-12-08 | End: 2022-01-18

## 2021-12-09 ENCOUNTER — TELEPHONE (OUTPATIENT)
Dept: OBGYN CLINIC | Facility: MEDICAL CENTER | Age: 61
End: 2021-12-09

## 2021-12-09 ENCOUNTER — OFFICE VISIT (OUTPATIENT)
Dept: FAMILY MEDICINE CLINIC | Facility: CLINIC | Age: 61
End: 2021-12-09
Payer: COMMERCIAL

## 2021-12-09 VITALS
WEIGHT: 221.2 LBS | HEIGHT: 64 IN | TEMPERATURE: 96.8 F | DIASTOLIC BLOOD PRESSURE: 88 MMHG | BODY MASS INDEX: 37.76 KG/M2 | SYSTOLIC BLOOD PRESSURE: 130 MMHG

## 2021-12-09 DIAGNOSIS — R22.9 SUBCUTANEOUS NODULE: Primary | ICD-10-CM

## 2021-12-09 PROCEDURE — 99213 OFFICE O/P EST LOW 20 MIN: CPT | Performed by: FAMILY MEDICINE

## 2021-12-09 RX ORDER — CEPHALEXIN 500 MG/1
500 CAPSULE ORAL EVERY 8 HOURS SCHEDULED
Qty: 21 CAPSULE | Refills: 0 | Status: SHIPPED | OUTPATIENT
Start: 2021-12-09 | End: 2021-12-16

## 2021-12-09 RX ORDER — TRAMADOL HYDROCHLORIDE 50 MG/1
50 TABLET ORAL 2 TIMES DAILY PRN
Qty: 60 TABLET | Refills: 0 | Status: SHIPPED | OUTPATIENT
Start: 2021-12-09 | End: 2022-01-05 | Stop reason: SDUPTHER

## 2021-12-09 RX ORDER — ONDANSETRON 4 MG/1
4 TABLET, ORALLY DISINTEGRATING ORAL EVERY 6 HOURS PRN
Qty: 20 TABLET | Refills: 0 | Status: SHIPPED | OUTPATIENT
Start: 2021-12-09 | End: 2022-01-13 | Stop reason: SDUPTHER

## 2021-12-10 ENCOUNTER — OFFICE VISIT (OUTPATIENT)
Dept: SURGERY | Facility: CLINIC | Age: 61
End: 2021-12-10
Payer: COMMERCIAL

## 2021-12-10 VITALS
SYSTOLIC BLOOD PRESSURE: 130 MMHG | BODY MASS INDEX: 38.07 KG/M2 | WEIGHT: 223 LBS | TEMPERATURE: 95.7 F | HEART RATE: 66 BPM | HEIGHT: 64 IN | DIASTOLIC BLOOD PRESSURE: 90 MMHG

## 2021-12-10 DIAGNOSIS — K21.9 GASTROESOPHAGEAL REFLUX DISEASE WITHOUT ESOPHAGITIS: ICD-10-CM

## 2021-12-10 DIAGNOSIS — R10.84 GENERALIZED ABDOMINAL PAIN: ICD-10-CM

## 2021-12-10 DIAGNOSIS — T14.8XXA HEMATOMA: Primary | ICD-10-CM

## 2021-12-10 DIAGNOSIS — K25.3 ACUTE GASTRIC ULCER WITHOUT HEMORRHAGE OR PERFORATION: Primary | ICD-10-CM

## 2021-12-10 PROBLEM — J45.40 MODERATE PERSISTENT ASTHMA WITHOUT COMPLICATION: Status: ACTIVE | Noted: 2021-03-30

## 2021-12-10 PROBLEM — J44.9 COPD (CHRONIC OBSTRUCTIVE PULMONARY DISEASE) (HCC): Status: ACTIVE | Noted: 2021-08-19

## 2021-12-10 PROCEDURE — 3008F BODY MASS INDEX DOCD: CPT | Performed by: SURGERY

## 2021-12-10 PROCEDURE — 99243 OFF/OP CNSLTJ NEW/EST LOW 30: CPT | Performed by: SURGERY

## 2021-12-10 PROCEDURE — 1036F TOBACCO NON-USER: CPT | Performed by: SURGERY

## 2021-12-19 DIAGNOSIS — I10 ESSENTIAL HYPERTENSION: ICD-10-CM

## 2021-12-20 RX ORDER — METOPROLOL SUCCINATE 100 MG/1
TABLET, EXTENDED RELEASE ORAL
Qty: 180 TABLET | Refills: 0 | Status: SHIPPED | OUTPATIENT
Start: 2021-12-20 | End: 2022-03-07 | Stop reason: SDUPTHER

## 2021-12-22 DIAGNOSIS — Z00.00 WELL ADULT EXAM: ICD-10-CM

## 2021-12-22 DIAGNOSIS — I48.0 PAROXYSMAL ATRIAL FIBRILLATION (HCC): ICD-10-CM

## 2021-12-22 DIAGNOSIS — R39.9 UTI SYMPTOMS: ICD-10-CM

## 2021-12-22 DIAGNOSIS — N30.10 INTERSTITIAL CYSTITIS: Primary | ICD-10-CM

## 2021-12-22 RX ORDER — APIXABAN 5 MG/1
TABLET, FILM COATED ORAL
Qty: 180 TABLET | Refills: 0 | Status: SHIPPED | OUTPATIENT
Start: 2021-12-22 | End: 2021-12-22 | Stop reason: SDUPTHER

## 2021-12-22 RX ORDER — SUCRALFATE 1 G/1
1 TABLET ORAL 4 TIMES DAILY
Qty: 360 TABLET | Refills: 1 | Status: SHIPPED | OUTPATIENT
Start: 2021-12-22 | End: 2022-06-13

## 2021-12-22 RX ORDER — NITROFURANTOIN 25; 75 MG/1; MG/1
100 CAPSULE ORAL 2 TIMES DAILY
Qty: 14 CAPSULE | Refills: 0 | Status: SHIPPED | OUTPATIENT
Start: 2021-12-22 | End: 2021-12-29

## 2021-12-23 ENCOUNTER — TELEPHONE (OUTPATIENT)
Dept: OBGYN CLINIC | Facility: MEDICAL CENTER | Age: 61
End: 2021-12-23

## 2022-01-07 ENCOUNTER — PREP FOR PROCEDURE (OUTPATIENT)
Dept: OBGYN CLINIC | Facility: MEDICAL CENTER | Age: 62
End: 2022-01-07

## 2022-01-07 ENCOUNTER — TELEPHONE (OUTPATIENT)
Dept: CARDIOLOGY CLINIC | Facility: CLINIC | Age: 62
End: 2022-01-07

## 2022-01-07 ENCOUNTER — OFFICE VISIT (OUTPATIENT)
Dept: OBGYN CLINIC | Facility: MEDICAL CENTER | Age: 62
End: 2022-01-07
Payer: COMMERCIAL

## 2022-01-07 VITALS
BODY MASS INDEX: 37.73 KG/M2 | WEIGHT: 221 LBS | HEIGHT: 64 IN | DIASTOLIC BLOOD PRESSURE: 80 MMHG | SYSTOLIC BLOOD PRESSURE: 124 MMHG

## 2022-01-07 DIAGNOSIS — Z12.31 SCREENING MAMMOGRAM FOR HIGH-RISK PATIENT: Primary | ICD-10-CM

## 2022-01-07 DIAGNOSIS — N88.2 CERVICAL STENOSIS (UTERINE CERVIX): ICD-10-CM

## 2022-01-07 PROCEDURE — 99214 OFFICE O/P EST MOD 30 MIN: CPT | Performed by: OBSTETRICS & GYNECOLOGY

## 2022-01-07 RX ORDER — MISOPROSTOL 100 UG/1
100 TABLET ORAL ONCE
Qty: 1 TABLET | Refills: 0 | Status: SHIPPED | OUTPATIENT
Start: 2022-01-07 | End: 2022-01-26 | Stop reason: CLARIF

## 2022-01-07 NOTE — H&P
A/p    1   Thickened endometrium    Increase from 6mm to 11 mm   R/o hyperplasia and cancer  58 y/o   For dc hysteroscopy     Past medical / social / surgical / family history reviewed and updated   Medication and allergies discussed in detail and updated       Review of Systems - History obtained from chart review and the patient  General ROS: negative  Psychological ROS: negative  Ophthalmic ROS: negative  ENT ROS: negative  Allergy and Immunology ROS: negative  Hematological and Lymphatic ROS: negative  Endocrine ROS: negative  Breast ROS: negative for breast lumps  Respiratory ROS: no cough, shortness of breath, or wheezing  Cardiovascular ROS: no chest pain or dyspnea on exertion  Gastrointestinal ROS: no abdominal pain, change in bowel habits, or black or bloody stools  Genito-Urinary ROS: no dysuria, trouble voiding, or hematuria  Musculoskeletal ROS: negative  Neurological ROS: no TIA or stroke symptoms  Dermatological ROS: negative      Physical Exam  Constitutional:       Appearance: She is well-developed  HENT:      Nose: Nose normal    Eyes:      Conjunctiva/sclera: Conjunctivae normal    Neck:      Thyroid: No thyromegaly  Cardiovascular:      Rate and Rhythm: Normal rate and regular rhythm  Heart sounds: Normal heart sounds  Pulmonary:      Effort: Pulmonary effort is normal  No respiratory distress  Breath sounds: Normal breath sounds  No wheezing  Abdominal:      General: Bowel sounds are normal  There is no distension  Palpations: Abdomen is soft  Musculoskeletal:         General: Normal range of motion  Cervical back: Normal range of motion and neck supple  Skin:     General: Skin is warm  Neurological:      Mental Status: She is alert and oriented to person, place, and time  Psychiatric:         Behavior: Behavior normal          Thought Content:  Thought content normal          Judgment: Judgment normal

## 2022-01-07 NOTE — PROGRESS NOTES
Thickened endometrium   In 12/20- was noted to be 6 mm'  In 8/21- now 11 mm    Reports bleeding - not since she had menopause  She reports this was found when she had a ct scan in the ED and then they did sonogram     EMBX recommended    Advised that the increase in the thickness could be indicative of hyperplasia and or cancer   The only way could be for sure if tissue sample    Went over options for such   1  Do now   2  Do in operating room   3    Do in the office at anther date and take motrin and cytotec prior to that       Pt decided on getting it done in the operating room     HP done

## 2022-01-12 NOTE — TELEPHONE ENCOUNTER
Called pt and she called [facility] and took the other insurance off and they said they will pay for repatha

## 2022-01-15 DIAGNOSIS — N30.10 INTERSTITIAL CYSTITIS: ICD-10-CM

## 2022-01-17 RX ORDER — PHENAZOPYRIDINE HYDROCHLORIDE 200 MG/1
TABLET, FILM COATED ORAL
Qty: 10 TABLET | Refills: 0 | Status: SHIPPED | OUTPATIENT
Start: 2022-01-17 | End: 2022-01-28 | Stop reason: SDUPTHER

## 2022-01-18 ENCOUNTER — OFFICE VISIT (OUTPATIENT)
Dept: CARDIOLOGY CLINIC | Facility: CLINIC | Age: 62
End: 2022-01-18
Payer: COMMERCIAL

## 2022-01-18 VITALS — HEART RATE: 60 BPM | WEIGHT: 214.8 LBS | BODY MASS INDEX: 36.87 KG/M2

## 2022-01-18 DIAGNOSIS — R00.2 PALPITATIONS: Primary | ICD-10-CM

## 2022-01-18 DIAGNOSIS — I10 HTN (HYPERTENSION), BENIGN: ICD-10-CM

## 2022-01-18 PROCEDURE — 99214 OFFICE O/P EST MOD 30 MIN: CPT | Performed by: INTERNAL MEDICINE

## 2022-01-18 PROCEDURE — 93000 ELECTROCARDIOGRAM COMPLETE: CPT | Performed by: INTERNAL MEDICINE

## 2022-01-18 RX ORDER — DILTIAZEM HYDROCHLORIDE 120 MG/1
120 CAPSULE, EXTENDED RELEASE ORAL EVERY OTHER DAY
Qty: 45 CAPSULE | Refills: 5 | Status: SHIPPED | OUTPATIENT
Start: 2022-01-18 | End: 2022-01-31

## 2022-01-18 NOTE — PROGRESS NOTES
Cardiology             Amaurimona Medel  1960  [id number]                 Assessment/Plan:     1  Paroxysmal atrial fibrillation, diagnosed on Holter monitor 03/2020  2  Hypertension  3  Multiple drug intolerances and possible allergies  4  Probable heterozygous familial hypercholesterolemia, on Repatha  5  Obesity   6  Sleep apnea, compliant with CPAP therapy   7  Lower extremity edema, on furosemide, resolved         · Regular rhythm on examination, no evidence of atrial fibrillation prior Zio monitoring  Short runs of SVT, PACs, and PVCs were noted  She was unable to tolerate diltiazem  She is on maximum dose of metoprolol which will be continued  · Home blood pressure readings still elevated  She was unable to tolerate nifedipine due to headaches and nausea as well as diltiazem  She has been taking diltiazem as needed if her blood pressure is elevated which she has been doing okay with  She is agreeable to going back on diltiazem 120 mg every other day     Difficult to treat patient as she reports multiple medication side effects  HCTZ is has caused "throat closing,"atorvastatin has caused joint pains, valsartan has caused reported angioedema, hydralazine has caused "kidney pain " Patient agreeable to trying nifedipine  If this fails, may try verapamil as the next step  · Patient compliant with CPAP therapy low-sodium diet  She will continue monitoring blood pressures every day    She has been taking ibuprofen daily and I have requested that she cut down on nonsteroidal anti-inflammatories which may raise her blood pressure               Follow-up with me in 2 months              Interval History:      This is a 66-year-old female diagnosed with paroxysmal atrial fibrillation on Holter monitoring 3/020   That time echocardiogram had revealed mild-to-moderate aortic regurgitation with normal left ventricular systolic function   Nuclear stress test June 2020 was unremarkable with normal perfusion      She has hypertension, and has multiple medications listed as allergies including beta-blockers, although she has been taking metoprolol for quite some time   Although on her allergy list HCTZ states throat closing," she states that actually cause some ankle swelling along with amlodipine   Atorvastatin caused joint pain, although it is listed in her allergies also as throat closing    She seems to have had a true allergic reaction to hydralazine and valsartan   She states valsartan caused angioedema, and she does not remember what happened with hydralazine, although states she thinks it may have been ankle swelling      She was last seen by Dr Damari Courtney 01/28/2021 at which time she was maintained on metoprolol succinate and spironolactone for hypertension   At 1 point she was asked to take diltiazem as needed for palpitations  Mulugeta Mendez has been maintained on Eliquis anticoagulation        She underwent a Zio monitor 03/2021 at [facility](should be scanned into media section, personally reviewed rhythm strips) revealing normal sinus rhythm with 8 runs of SVT, longest lasting 17 beats   There were 18 patient triggers, most of which correlated with PACs, short atrial runs, and PVCs  Vini Anguiano was no evidence of atrial fibrillation      Nuclear stress test 06/2020 was unremarkable     In August 2021 she was hospitalized at [facility] for epigastric and left upper quadrant pain, admitted for superior mesenteric artery thrombosis   She received heparin drip, and underwent SMA stenting with IR on 08/19/2021   She was initiated on Plavix, and continued on Eliquis      During her prior visit 10/15/2021 she was initiated on diltiazem which she later stopped due to headache and nausea  Subsequent nifedipine was tried which caused headache and nausea as well which she stopped on her own      She presents today for follow-up  Home blood pressure readings have been ranging in the 140s to 170s    She has been taking ibuprofen daily for her headache which did not resolve after diltiazem or nifedipine were stopped  He complains of some indigestion symptoms which occur at rest, and has been taking Carafate which helps for the most part               Vitals:  Vitals:    01/18/22 1105   Pulse: 60   Weight: 97 4 kg (214 lb 12 8 oz)         Past Medical History:   Diagnosis Date    A-fib ([facility] [address] ) 03/2020    Allergic     Anxiety     Arthritis     Asthma     Colon polyp     Depression     GERD (gastroesophageal reflux disease)     Heart murmur     Hives     Hyperlipidemia     Hypertension     Infertility, female     Migraine     Palpitations     Psychiatric disorder     Wears glasses      Social History     Socioeconomic History    Marital status:      Spouse name: Not on file    Number of children: 0    Years of education: Not on file    Highest education level: Not on file   Occupational History    Occupation: unemployed   Tobacco Use    Smoking status: Former Smoker     Packs/day: 0 50     Years: 10 00     Pack years: 5 00     Quit date: 2019     Years since quitting: 3 0    Smokeless tobacco: Never Used   Vaping Use    Vaping Use: Never used   Substance and Sexual Activity    Alcohol use: No    Drug use: No    Sexual activity: Not Currently   Other Topics Concern    Not on file   Social History Narrative    Who lives in your home: Alone     What type of home do you live in: Apartment     Age of your home: 1952 built     How long have you been living there: 5 yrs     Type of heat: forced hot air     Type of fuel: electric     What type of jamin is in your bedroom: carpet jamin     Do you have the following in or near your home:    Air products: Humidifier in the bedroom/kitchen     Pests: none     Pets: none     Are pets allowed in bedroom: N/A     Open fields, wooded areas nearby: No     Basement: [id number] mold     Exposure to second hand smoke: No    Central air Habits:    Caffeine: Hot tea 1 cup daily- ice tea 1 cup in the afternoon    Chocolate: Occasionally      Social Determinants of Health     Financial Resource Strain: Low Risk     Difficulty of Paying Living Expenses: Not hard at all   Food Insecurity: No Food Insecurity    Worried About Running Out of Food in the Last Year: Never true    Donell of Food in the Last Year: Never true   Transportation Needs: No Transportation Needs    Lack of Transportation (Medical): No    Lack of Transportation (Non-Medical): No   Physical Activity: Inactive    Days of Exercise per Week: 0 days    Minutes of Exercise per Session: 0 min   Stress: Not on file   Social Connections:  Moderately Isolated    Frequency of Communication with Friends and Family: More than three times a week    Frequency of Social Gatherings with Friends and Family: More than three times a week    Attends Sikhism Services: 1 to 4 times per year    Active Member of Debt Resolve Group or Organizations: No    Attends Club or Organization Meetings: Never    Marital Status: Never    Intimate Partner Violence: Not At Risk    Fear of Current or Ex-Partner: No    Emotionally Abused: No    Physically Abused: No    Sexually Abused: No   Housing Stability: Not on file      Family History   Problem Relation Age of Onset    Lung cancer Mother     Brain cancer Mother     Diabetes Father     Depression Father     Other Father         septic     Diabetes Other     Allergies Sister     Hashimoto's thyroiditis Sister     Hodgkin's lymphoma Brother     Abdominal aortic aneurysm Sister     Diabetes Sister     No Known Problems Sister     No Known Problems Brother     No Known Problems Maternal Aunt      Past Surgical History:   Procedure Laterality Date    COLONOSCOPY      EGD      EXPLORATORY LAPAROTOMY      for infertility    HAND SURGERY      Hand excision of tendon cyst    VENTRICULOPERITONEAL SHUNT      WISDOM TOOTH EXTRACTION         Current Outpatient Medications:     acetaminophen (TYLENOL) 325 mg tablet, Take 650 mg by mouth as needed for mild pain , Disp: , Rfl:     apixaban (Eliquis) 5 mg, Take 1 tablet (5 mg total) by mouth 2 (two) times a day, Disp: 180 tablet, Rfl: 5    ascorbic acid (VITAMIN C) 1000 MG tablet, Take 1,000 mg by mouth daily, Disp: , Rfl:     butalbital-acetaminophen-caffeine (FIORICET,ESGIC) -40 mg per tablet, Take 1 tablet by mouth every 6 (six) hours as needed for headaches, Disp: 30 tablet, Rfl: 0    Carafate 1 GM/10ML suspension, Take 10 mL (1 g total) by mouth 4 (four) times a day, Disp: 3600 mL, Rfl: 1    cetirizine (ZyrTEC) 10 mg tablet, Take 1 tablet (10 mg total) by mouth daily for 365 doses, Disp: 30 tablet, Rfl: 11    chlordiazepoxide-clidinium (LIBRAX) 5-2 5 mg per capsule, Take 1 capsule by mouth 2 (two) times a day before meals, Disp: 60 capsule, Rfl: 5    chlorhexidine (PERIDEX) 0 12 % solution, , Disp: , Rfl:     Cholecalciferol 25 MCG (1000 UT) tablet, Take 1,000 Units by mouth daily, Disp: , Rfl:     Dexilant 60 MG capsule, Take 1 capsule (60 mg total) by mouth daily, Disp: 90 capsule, Rfl: 1    ELDERBERRY PO, Take by mouth daily , Disp: , Rfl:     Evolocumab 140 MG/ML SOAJ, Inject 1 mL (140 mg total) under the skin every 14 (fourteen) days, Disp: 2 pen, Rfl: 3    fexofenadine (ALLEGRA) 180 MG tablet, Take 1 tablet (180 mg total) by mouth daily IN AM, Disp: 30 tablet, Rfl: 12    fluticasone-vilanterol (BREO ELLIPTA) 200-25 MCG/INH inhaler, Inhale 1 puff daily Rinse mouth after use , Disp: 3 Inhaler, Rfl: 3    furosemide (LASIX) 40 mg tablet, Take 1 tablet (40 mg total) by mouth daily, Disp: 90 tablet, Rfl: 3    hydrocortisone (ANUSOL-HC) 2 5 % rectal cream, Insert rectally as needed  , Disp: 28 g, Rfl: 5    LORazepam (ATIVAN) 0 5 mg tablet, Take 1 tablet (0 5 mg total) by mouth every 8 (eight) hours as needed for anxiety, Disp: 90 tablet, Rfl: 5    metoprolol succinate (TOPROL-XL) 100 mg 24 hr tablet, TAKE ONE TABLET BY MOUTH TWICE DAILY, Disp: 180 tablet, Rfl: 0    montelukast (SINGULAIR) 10 mg tablet, TAKE ONE TABLET BY MOUTH AT BEDTIME , Disp: 90 tablet, Rfl: 0    MULTIPLE VITAMIN PO, Take by mouth, Disp: , Rfl:     nystatin (MYCOSTATIN) powder, , Disp: , Rfl:     omeprazole (PriLOSEC) 20 mg delayed release capsule, Take 1 capsule (20 mg total) by mouth daily, Disp: 90 capsule, Rfl: 1    ondansetron (Zofran ODT) 4 mg disintegrating tablet, Take 1 tablet (4 mg total) by mouth every 6 (six) hours as needed for nausea or vomiting, Disp: 20 tablet, Rfl: 0    phenazopyridine (PYRIDIUM) 200 mg tablet, TAKE ONE TABLET BY MOUTH THREE TIMES DAILY WITH MEALS, Disp: 10 tablet, Rfl: 0    spironolactone (ALDACTONE) 25 mg tablet, Take 1 tablet (25 mg total) by mouth 2 (two) times a day, Disp: 180 tablet, Rfl: 0    sucralfate (CARAFATE) 1 g tablet, Take 1 tablet (1 g total) by mouth 4 (four) times a day, Disp: 360 tablet, Rfl: 1    traMADol (Ultram) 50 mg tablet, Take 1 tablet (50 mg total) by mouth 2 (two) times a day as needed for moderate pain, Disp: 60 tablet, Rfl: 0    triamcinolone (KENALOG) 0 1 % cream, Apply topically 2 (two) times a day, Disp: 30 g, Rfl: 1    Zinc 100 MG TABS, Take by mouth daily, Disp: , Rfl:     Cholecalciferol 25 MCG (1000 UT) tablet, Take 1,000 Units by mouth 2 (two) times a day (Patient not taking: Reported on 1/18/2022 ), Disp: , Rfl:     clopidogrel (PLAVIX) 75 mg tablet, Take 75 mg by mouth daily (Patient not taking: Reported on 12/10/2021 ), Disp: , Rfl:     diltiazem (DILACOR XR) 120 MG 24 hr capsule, Take 1 capsule (120 mg total) by mouth every other day, Disp: 45 capsule, Rfl: 5    Epinastine HCl 0 05 % ophthalmic solution, Administer 1 drop to both eyes 2 (two) times a day (Patient not taking: Reported on 1/7/2022 ), Disp: 5 mL, Rfl: 12    misoprostol (CYTOTEC) 100 mcg tablet, Take 1 tablet (100 mcg total) by mouth 1 (one) time for 1 dose Morning of the surgery (Patient not taking: Reported on 1/18/2022 ), Disp: 1 tablet, Rfl: 0    ondansetron (ZOFRAN) 4 mg tablet, Take 1 tablet (4 mg total) by mouth every 8 (eight) hours as needed for nausea or vomiting (Patient not taking: Reported on 1/18/2022 ), Disp: 20 tablet, Rfl: 0    traMADol (ULTRAM) 50 mg tablet, Take 1 tablet (50 mg total) by mouth daily at bedtime as needed for severe pain (Patient not taking: Reported on 12/10/2021 ), Disp: 20 tablet, Rfl: 0    Current Facility-Administered Medications:     cyanocobalamin injection 1,000 mcg, 1,000 mcg, Intramuscular, Q30 Days, [facility], DO, 1,000 mcg at 09/22/21 1322    Vitamin B-12 SUBL, , Sublingual, Once, [facility],         Review of Systems:  Review of Systems   Constitutional: Negative for activity change, fever and unexpected weight change  HENT: Negative for facial swelling, nosebleeds and voice change  Respiratory: Negative for chest tightness, shortness of breath and wheezing  Cardiovascular: Negative for chest pain, palpitations and leg swelling  Gastrointestinal: Positive for nausea  Negative for abdominal distention  Genitourinary: Negative for hematuria  Musculoskeletal: Negative for arthralgias  Skin: Negative for color change, pallor, rash and wound  Neurological: Positive for headaches  Negative for dizziness, seizures and syncope  Psychiatric/Behavioral: Negative for agitation  Physical Exam:  Physical Exam  Vitals reviewed  Constitutional:       Appearance: She is well-developed  HENT:      Head: Normocephalic and atraumatic  Cardiovascular:      Rate and Rhythm: Normal rate and regular rhythm  Heart sounds: Normal heart sounds  Pulmonary:      Effort: Pulmonary effort is normal       Breath sounds: Normal breath sounds  Abdominal:      Palpations: Abdomen is soft  Musculoskeletal:         General: Normal range of motion  Cervical back: Normal range of motion and neck supple     Skin: General: Skin is warm and dry  Neurological:      Mental Status: She is alert and oriented to person, place, and time  Psychiatric:         Behavior: Behavior normal          Thought Content: Thought content normal          Judgment: Judgment normal          This note was completed in part utilizing M-Modal Fluency Direct Software  Grammatical errors, random word insertions, spelling mistakes, and incomplete sentences can be an occasional consequence of this system secondary to software limitations, ambient noise, and hardware issues  If you have any questions or concerns about the content, text, or information contained within the body of this dictation, please contact the provider for clarification

## 2022-01-22 DIAGNOSIS — G43.001 MIGRAINE WITHOUT AURA AND WITH STATUS MIGRAINOSUS, NOT INTRACTABLE: ICD-10-CM

## 2022-01-23 RX ORDER — BUTALBITAL, ACETAMINOPHEN AND CAFFEINE 50; 325; 40 MG/1; MG/1; MG/1
TABLET ORAL
Qty: 30 TABLET | Refills: 0 | Status: SHIPPED | OUTPATIENT
Start: 2022-01-23 | End: 2022-02-10 | Stop reason: SDUPTHER

## 2022-01-25 ENCOUNTER — OFFICE VISIT (OUTPATIENT)
Dept: FAMILY MEDICINE CLINIC | Facility: CLINIC | Age: 62
End: 2022-01-25
Payer: COMMERCIAL

## 2022-01-25 ENCOUNTER — NURSE TRIAGE (OUTPATIENT)
Dept: OTHER | Facility: OTHER | Age: 62
End: 2022-01-25

## 2022-01-25 VITALS
OXYGEN SATURATION: 98 % | SYSTOLIC BLOOD PRESSURE: 140 MMHG | HEIGHT: 64 IN | BODY MASS INDEX: 37.01 KG/M2 | RESPIRATION RATE: 16 BRPM | WEIGHT: 216.8 LBS | TEMPERATURE: 97 F | DIASTOLIC BLOOD PRESSURE: 86 MMHG | HEART RATE: 61 BPM

## 2022-01-25 DIAGNOSIS — N30.00 ACUTE CYSTITIS WITHOUT HEMATURIA: ICD-10-CM

## 2022-01-25 DIAGNOSIS — M79.602 LEFT ARM PAIN: ICD-10-CM

## 2022-01-25 DIAGNOSIS — N30.10 INTERSTITIAL CYSTITIS: Primary | ICD-10-CM

## 2022-01-25 PROCEDURE — 99213 OFFICE O/P EST LOW 20 MIN: CPT | Performed by: FAMILY MEDICINE

## 2022-01-25 RX ORDER — TRAMADOL HYDROCHLORIDE 50 MG/1
50 TABLET ORAL EVERY 8 HOURS PRN
Qty: 60 TABLET | Refills: 0 | Status: SHIPPED | OUTPATIENT
Start: 2022-01-25 | End: 2022-02-15 | Stop reason: SDUPTHER

## 2022-01-25 RX ORDER — CEPHALEXIN 250 MG/1
500 CAPSULE ORAL
COMMUNITY
Start: 2022-01-19

## 2022-01-25 RX ORDER — CEPHALEXIN 500 MG/1
CAPSULE ORAL
COMMUNITY
Start: 2022-01-19 | End: 2022-02-18

## 2022-01-25 NOTE — TELEPHONE ENCOUNTER
Reason for Disposition   Abdominal pain is a chronic symptom (recurrent or ongoing AND lasting > 4 weeks)    Answer Assessment - Initial Assessment Questions  1  LOCATION: "Where does it hurt?"       Left lower abdomen     2  RADIATION: "Does the pain shoot anywhere else?" (e g , chest, back)      denies     3  ONSET: "When did the pain begin?" (e g , minutes, hours or days ago)       Has been an ongoing thing because of stent and doctor knows about it     4  SUDDEN: "Gradual or sudden onset?"      Gradually     5  PATTERN "Does the pain come and go, or is it constant?"     - If constant: "Is it getting better, staying the same, or worsening?"       (Note: Constant means the pain never goes away completely; most serious pain is constant and it progresses)      - If intermittent: "How long does it last?" "Do you have pain now?"      (Note: Intermittent means the pain goes away completely between bouts)      States it is hard to say but gets worse sometimes then other    6  SEVERITY: "How bad is the pain?"  (e g , Scale 1-10; mild, moderate, or severe)    - MILD (1-3): doesn't interfere with normal activities, abdomen soft and not tender to touch     - MODERATE (4-7): interferes with normal activities or awakens from sleep, tender to touch     - SEVERE (8-10): excruciating pain, doubled over, unable to do any normal activities       States it is hard to say because some nights are worse then others     7  RECURRENT SYMPTOM: "Have you ever had this type of stomach pain before?" If Yes, ask: "When was the last time?" and "What happened that time?"       Yes, has been an ongoing issue     8  CAUSE: "What do you think is causing the stomach pain?"      Unsure, states stools were hard and round     9   OTHER SYMPTOMS: "Has there been any vomiting, diarrhea, constipation, or urine problems?"        Nausea, sweats       Taking tramadol at night 2 a night and wants to change the prescription for every 6 hours to compensate when she takes 2 at night    Protocols used: ABDOMINAL PAIN - [facility] - GANESH LOPEZ

## 2022-01-25 NOTE — TELEPHONE ENCOUNTER
Regarding: abdominal discomfort; wants appt  ----- Message from Alexis Lou sent at 1/25/2022  7:57 AM EST -----  "I having discomfort in my abdominal area and I would like an appointment "

## 2022-01-25 NOTE — TELEPHONE ENCOUNTER
Patient called in stating she has been having pain in her left lower abdomen again and she is worried so she would like to talk to the doctor about this

## 2022-01-25 NOTE — PROGRESS NOTES
Assessment/Plan:  The CT scan reviewed  Patient have UA C&S  Patient will continue with Dexilant daily  Patient will be seeing GI tomorrow  Diagnoses and all orders for this visit:    Interstitial cystitis    Acute cystitis without hematuria    Left arm pain  -     traMADol (ULTRAM) 50 mg tablet; Take 1 tablet (50 mg total) by mouth every 8 (eight) hours as needed for severe pain    Other orders  -     cephalexin (KEFLEX) 250 mg capsule  -     cephalexin (KEFLEX) 500 mg capsule            Subjective:        Patient ID: Pepe Stanton is a 59 y o  female  Patient is here status post installation of medication for interstitial cystitis done on the 4th  Patient with increasing pain since that time  Patient did have UTI at that time  Patient was placed on antibiotics, Keflex  Patient has 2 more days of Keflex  Patient does have pain in abdomen  Patient has some nausea associated with this  No fever patient with some left flank pain        The following portions of the patient's history were reviewed and updated as appropriate: allergies, current medications, past family history, past medical history, past social history, past surgical history and problem list       Review of Systems   Constitutional: Negative  HENT: Negative  Eyes: Negative  Respiratory: Negative  Cardiovascular: Negative  Gastrointestinal: Negative  Endocrine: Negative  Genitourinary: Positive for dysuria and flank pain  Skin: Negative  Allergic/Immunologic: Negative  Neurological: Negative  Hematological: Negative  Psychiatric/Behavioral: Negative  Objective:               /86 (BP Location: Right arm, Patient Position: Sitting, Cuff Size: Large)   Pulse 61   Temp (!) 97 °F (36 1 °C) (Tympanic)   Resp 16   Ht 5' 4" (1 626 m)   Wt 98 3 kg (216 lb 12 8 oz)   LMP  (LMP Unknown)   SpO2 98%   BMI 37 21 kg/m²          Physical Exam  Nursing note reviewed     Constitutional: Appearance: She is well-developed  Abdominal:      General: Abdomen is flat  There is no distension  Palpations: Abdomen is soft  There is no mass  Tenderness: There is abdominal tenderness  There is no right CVA tenderness, left CVA tenderness, guarding or rebound  Comments: Patient with pain left upper quadrant and left lower quadrant and left flank   Neurological:      Mental Status: She is alert

## 2022-01-26 ENCOUNTER — OFFICE VISIT (OUTPATIENT)
Dept: GASTROENTEROLOGY | Facility: CLINIC | Age: 62
End: 2022-01-26
Payer: COMMERCIAL

## 2022-01-26 VITALS
SYSTOLIC BLOOD PRESSURE: 134 MMHG | HEIGHT: 64 IN | TEMPERATURE: 98.7 F | DIASTOLIC BLOOD PRESSURE: 52 MMHG | WEIGHT: 215.6 LBS | BODY MASS INDEX: 36.81 KG/M2

## 2022-01-26 DIAGNOSIS — E66.9 OBESITY (BMI 30-39.9): ICD-10-CM

## 2022-01-26 DIAGNOSIS — F11.20 CONTINUOUS OPIOID DEPENDENCE (HCC): ICD-10-CM

## 2022-01-26 DIAGNOSIS — K55.069 MESENTERIC ARTERY THROMBOSIS (HCC): Primary | ICD-10-CM

## 2022-01-26 DIAGNOSIS — R22.9 SUBCUTANEOUS NODULE: ICD-10-CM

## 2022-01-26 DIAGNOSIS — K76.0 FATTY LIVER: ICD-10-CM

## 2022-01-26 DIAGNOSIS — K25.3 ACUTE GASTRIC ULCER WITHOUT HEMORRHAGE OR PERFORATION: ICD-10-CM

## 2022-01-26 DIAGNOSIS — K21.9 GASTROESOPHAGEAL REFLUX DISEASE WITHOUT ESOPHAGITIS: ICD-10-CM

## 2022-01-26 DIAGNOSIS — K57.30 DIVERTICULOSIS LARGE INTESTINE W/O PERFORATION OR ABSCESS W/O BLEEDING: ICD-10-CM

## 2022-01-26 DIAGNOSIS — R10.84 GENERALIZED ABDOMINAL PAIN: ICD-10-CM

## 2022-01-26 PROBLEM — R10.13 EPIGASTRIC PAIN: Status: RESOLVED | Noted: 2019-05-13 | Resolved: 2022-01-26

## 2022-01-26 PROBLEM — R10.11 RIGHT UPPER QUADRANT PAIN: Status: RESOLVED | Noted: 2019-05-13 | Resolved: 2022-01-26

## 2022-01-26 PROCEDURE — 99214 OFFICE O/P EST MOD 30 MIN: CPT | Performed by: INTERNAL MEDICINE

## 2022-01-26 PROCEDURE — 3008F BODY MASS INDEX DOCD: CPT | Performed by: SURGERY

## 2022-01-26 NOTE — PROGRESS NOTES
[facility] - Outpatient Follow-up Note  Richarda Koyanagi 59 y o  female MRN: [id number]  Encounter: [id number]      ASSESSMENT AND PLAN:      59year old female here for follow up  Saw my partner in 2019        1  Acute gastric ulcer without hemorrhage or perforation  -diagnosed in 2019; no follow up EGD yet, will order now  -avoid NSAIDS     2  Fatty liver  -rec weight loss     3  Gastroesophageal reflux disease without esophagitis  -continue dexilant    4  Diverticulosis large intestine w/o perforation or abscess w/o bleeding  -high fiber diet     5  Acute constipation with history of IBS-constipation predominant  -continue OTC drugs daily for constipation  -continue to drink a lot of water     6  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HCC)  S/p stent at [facility]- will repeat duplex ultrasound     7  Obesity (BMI 30-39 9)    8  Continuous opioid dependence (HCC)  On tramadol; continue constipation regimen     9  Tobacco abuse  -quit 16 months ago     Follow up in a few months time      _______________________________________    SUBJECTIVE:  59year old female here for follow up  She had EGD in 2019 with PUD found  Has not had follow up EGD  Colonoscopy in 2019 showed diverticulosis  Has had prior polyps  She is unsure if she has family history of colon cancer  She reports a history of acute abdominal pain and reports she went to the ER and was a found to have a SMA thrombosis and she had a stent placed back in August of 2021  This was done by Dr Virginia Mcfarlane  Reports a recent episode last week with left sided abdominal pain and noticed a bruise on the skin which occurred after an episode of vomiting and then noticed a bruise on her skin after that in December  Also, reports recent episode of small pebble like stools with her bowel movements  Currently having a BM every 3 days  She currently started OTC medications with magnesium, fiber      She also reports interstitial cystitis for the past few months which has been bothersome to her  REVIEW OF SYSTEMS IS OTHERWISE NEGATIVE      Historical Information   Past Medical History:   Diagnosis Date    A-fib ([address] ) 03/2020    Allergic     Anxiety     Arthritis     Asthma     Colon polyp     Depression     GERD (gastroesophageal reflux disease)     Heart murmur     Hives     Hyperlipidemia     Hypertension     Infertility, female     Migraine     Palpitations     Psychiatric disorder     Wears glasses      Past Surgical History:   Procedure Laterality Date    COLONOSCOPY      EGD      EXPLORATORY LAPAROTOMY      for infertility    HAND SURGERY      Hand excision of tendon cyst    VENTRICULOPERITONEAL SHUNT      WISDOM TOOTH EXTRACTION       Social History   Social History     Substance and Sexual Activity   Alcohol Use No     Social History     Substance and Sexual Activity   Drug Use No     Social History     Tobacco Use   Smoking Status Former Smoker    Packs/day: 0 50    Years: 10 00    Pack years: 5 00    Quit date: 2019    Years since quitting: 3 0   Smokeless Tobacco Never Used     Family History   Problem Relation Age of Onset    Lung cancer Mother    Mavis Cousin Brain cancer Mother     Diabetes Father     Depression Father     Other Father         septic     Allergies Sister     Hashimoto's thyroiditis Sister     Abdominal aortic aneurysm Sister     Diabetes Sister     No Known Problems Sister     Hodgkin's lymphoma Brother     No Known Problems Brother     Diabetes Other        Meds/Allergies       Current Outpatient Medications:     acetaminophen (TYLENOL) 325 mg tablet    apixaban (Eliquis) 5 mg    ascorbic acid (VITAMIN C) 1000 MG tablet    butalbital-acetaminophen-caffeine (FIORICET,ESGIC) -40 mg per tablet    Carafate 1 GM/10ML suspension    cephalexin (KEFLEX) 250 mg capsule    cephalexin (KEFLEX) 500 mg capsule    cetirizine (ZyrTEC) 10 mg tablet    chlordiazepoxide-clidinium (LIBRAX) 5-2 5 mg per capsule    chlorhexidine (PERIDEX) 0 12 % solution    Cholecalciferol 25 MCG (1000 UT) tablet    Cholecalciferol 25 MCG (1000 UT) tablet    clopidogrel (PLAVIX) 75 mg tablet    Dexilant 60 MG capsule    diltiazem (DILACOR XR) 120 MG 24 hr capsule    ELDERBERRY PO    Epinastine HCl 0 05 % ophthalmic solution    Evolocumab 140 MG/ML SOAJ    fexofenadine (ALLEGRA) 180 MG tablet    fluticasone-vilanterol (BREO ELLIPTA) 200-25 MCG/INH inhaler    furosemide (LASIX) 40 mg tablet    hydrocortisone (ANUSOL-HC) 2 5 % rectal cream    LORazepam (ATIVAN) 0 5 mg tablet    metoprolol succinate (TOPROL-XL) 100 mg 24 hr tablet    misoprostol (CYTOTEC) 100 mcg tablet    montelukast (SINGULAIR) 10 mg tablet    MULTIPLE VITAMIN PO    nystatin (MYCOSTATIN) powder    omeprazole (PriLOSEC) 20 mg delayed release capsule    ondansetron (Zofran ODT) 4 mg disintegrating tablet    ondansetron (ZOFRAN) 4 mg tablet    phenazopyridine (PYRIDIUM) 200 mg tablet    spironolactone (ALDACTONE) 25 mg tablet    sucralfate (CARAFATE) 1 g tablet    traMADol (Ultram) 50 mg tablet    traMADol (ULTRAM) 50 mg tablet    triamcinolone (KENALOG) 0 1 % cream    Zinc 100 MG TABS    Current Facility-Administered Medications:     cyanocobalamin injection 1,000 mcg, 1,000 mcg, Intramuscular, Q30 Days, 1,000 mcg at 09/22/21 1322    Vitamin B-12 SUBL, , Sublingual, Once    Allergies   Allergen Reactions    Ace Inhibitors Angioedema and Anaphylaxis     Anaphylaxis    Other Other (See Comments) and Anaphylaxis     Other reaction(s): angioadema  Other reaction(s): Other (See Comments)  Preservatives- itching throat closes, hi  Other reaction(s): angioadema  E Z Cat - hives throat closes itching,  Preservatives- itching throat closes, hi    Benicar [Olmesartan] Angioedema     See Allergy note from 9/11/2008        Hydralazine      Lip swelling    Sulfa Antibiotics Other (See Comments)     stuffiness,itching,hives,throat closing    Valsartan Angioedema     Objective     Vitals:    01/26/22 1345   BP: 134/52   Temp: 98 7 °F (37 1 °C)     PHYSICAL EXAM:      General Appearance:   Alert, cooperative, no distress   HEENT:   Normocephalic, atraumatic, anicteric      Neck:  Supple, symmetrical, trachea midline   Lungs:   Clear to auscultation bilaterally; no rales, rhonchi or wheezing; respirations unlabored    Heart[de-identified]   Regular rate and rhythm; no murmur, rub, or gallop  Abdomen:   Soft, non-tender, non-distended; normal bowel sounds; no masses, no organomegaly    Genitalia:   Deferred    Rectal:   Deferred    Extremities:  No cyanosis, clubbing or edema    Pulses:  2+ and symmetric    Skin:  No jaundice, rashes, or lesions    Lymph nodes:  No palpable cervical lymphadenopathy        Lab Results:   No visits with results within 1 Day(s) from this visit     Latest known visit with results is:   Appointment on 10/01/2021   Component Date Value    Sodium 10/01/2021 142     Potassium 10/01/2021 3 7     Chloride 10/01/2021 103     CO2 10/01/2021 28     ANION GAP 10/01/2021 11     BUN 10/01/2021 28*    Creatinine 10/01/2021 0 87     Glucose, Fasting 10/01/2021 136*    Calcium 10/01/2021 9 5     AST 10/01/2021 32     ALT 10/01/2021 52     Alkaline Phosphatase 10/01/2021 122*    Total Protein 10/01/2021 7 7     Albumin 10/01/2021 4 3     Total Bilirubin 10/01/2021 0 39     eGFR 10/01/2021 71     WBC 10/01/2021 5 86     RBC 10/01/2021 4 22     Hemoglobin 10/01/2021 12 3     Hematocrit 10/01/2021 37 9     MCV 10/01/2021 90     MCH 10/01/2021 29 1     MCHC 10/01/2021 32 5     RDW 10/01/2021 14 6     MPV 10/01/2021 9 6     Platelets 61/50/6083 218     nRBC 10/01/2021 0     Neutrophils Relative 10/01/2021 60     Immat GRANS % 10/01/2021 1     Lymphocytes Relative 10/01/2021 25     Monocytes Relative 10/01/2021 11     Eosinophils Relative 10/01/2021 2     Basophils Relative 10/01/2021 1     Neutrophils Absolute 10/01/2021 3 61     Immature Grans Absolute 10/01/2021 0 05     Lymphocytes Absolute 10/01/2021 1 45     Monocytes Absolute 10/01/2021 0 62     Eosinophils Absolute 10/01/2021 0 10     Basophils Absolute 10/01/2021 0 03     Cholesterol 10/01/2021 200     Triglycerides 10/01/2021 228*    HDL, Direct 10/01/2021 51     LDL Calculated 10/01/2021 103*    Non-HDL-Chol (CHOL-HDL) 10/01/2021 149     TSH 3RD GENERATON 10/01/2021 1 953        Radiology Results:   No results found

## 2022-01-26 NOTE — PATIENT INSTRUCTIONS
EGD scheduled for 5/24 with Dr Yousif King at [facility] AND [facility]

## 2022-01-27 ENCOUNTER — TELEPHONE (OUTPATIENT)
Dept: GASTROENTEROLOGY | Facility: CLINIC | Age: 62
End: 2022-01-27

## 2022-01-27 ENCOUNTER — TELEPHONE (OUTPATIENT)
Dept: CARDIOLOGY CLINIC | Facility: CLINIC | Age: 62
End: 2022-01-27

## 2022-01-27 ENCOUNTER — TELEPHONE (OUTPATIENT)
Dept: GASTROENTEROLOGY | Facility: AMBULARY SURGERY CENTER | Age: 62
End: 2022-01-27

## 2022-01-27 NOTE — TELEPHONE ENCOUNTER
Offered pt multiple dates with another provider as pt stated she wanted this EGD done ASP but only wanted it to be done with Dr Mayela Berumen  EGD rescheduled to 3/29 with Dr Mayela Berumen during inpatient   Therese RAJPUT from GI lab approved)

## 2022-01-27 NOTE — TELEPHONE ENCOUNTER
Patients GI provider:  Dr Carlyle Monet     Number to return call: [phone]     Reason for call: Pt calling requesting to get in sooner than 5/24/22     Scheduled procedure/appointment date if applicable: Apt/procedure 5/24/22

## 2022-01-27 NOTE — TELEPHONE ENCOUNTER
Our mutual patient is scheduled for procedure:  EGD    On: _3__/_  29  _/_  2022       With:   __Myesha____    She is taking the following blood thinner:   Eliquis       Can this be stopped ___2___ days prior to the procedure?       Physician Approving clearance: ________________________

## 2022-01-27 NOTE — TELEPHONE ENCOUNTER
Pt calling stating was restarted on diltiazem (but remembers Dr Sang Stiles thinking about using lisinopril) and is calling to see if the lisinopril may work better because she feels the diltiazem is causing,"a rapid heart rate that turns into to palpitaions" "states not straight palpitations  Please advise  Told her would discuss with Dr Sang Stiles

## 2022-01-27 NOTE — TELEPHONE ENCOUNTER
Reluctant to use lisinopril as she has had angioedema on losartan  Can try verapamil if she's agreeable

## 2022-01-28 NOTE — TELEPHONE ENCOUNTER
Pt called returned call is willing to try verapamil please place rx  Send to [facility]  Thank you  Pt states she not  Urinating as much has interstuital cystitis and states that may be the issue notices/but worried if of cardiac nature   weight is up about a pound and wants to know if you want her on additional dose of diuretic in the pm  Please advise

## 2022-01-31 ENCOUNTER — TELEPHONE (OUTPATIENT)
Dept: FAMILY MEDICINE CLINIC | Facility: CLINIC | Age: 62
End: 2022-01-31

## 2022-01-31 DIAGNOSIS — L30.9 DERMATITIS: Primary | ICD-10-CM

## 2022-01-31 NOTE — TELEPHONE ENCOUNTER
Patient contacted and aware referral was put in  Patient will contact dermatology if she does not hear from them

## 2022-01-31 NOTE — TELEPHONE ENCOUNTER
Rx sent in  Please be sure she stops diltiazem before starting verapamil  She shouldn't take both    Thx

## 2022-02-01 ENCOUNTER — OFFICE VISIT (OUTPATIENT)
Dept: URGENT CARE | Facility: MEDICAL CENTER | Age: 62
End: 2022-02-01
Payer: COMMERCIAL

## 2022-02-01 ENCOUNTER — APPOINTMENT (OUTPATIENT)
Dept: RADIOLOGY | Facility: MEDICAL CENTER | Age: 62
End: 2022-02-01
Payer: COMMERCIAL

## 2022-02-01 VITALS
DIASTOLIC BLOOD PRESSURE: 68 MMHG | OXYGEN SATURATION: 96 % | TEMPERATURE: 97.9 F | SYSTOLIC BLOOD PRESSURE: 150 MMHG | HEART RATE: 60 BPM | RESPIRATION RATE: 16 BRPM

## 2022-02-01 DIAGNOSIS — M79.675 PAIN IN LEFT TOE(S): ICD-10-CM

## 2022-02-01 DIAGNOSIS — M79.675 PAIN IN LEFT TOE(S): Primary | ICD-10-CM

## 2022-02-01 PROCEDURE — 99213 OFFICE O/P EST LOW 20 MIN: CPT | Performed by: PHYSICIAN ASSISTANT

## 2022-02-01 PROCEDURE — 73660 X-RAY EXAM OF TOE(S): CPT

## 2022-02-01 PROCEDURE — 73630 X-RAY EXAM OF FOOT: CPT

## 2022-02-01 NOTE — PROGRESS NOTES
[address]        NAME: Charanjit Benitez is a 59 y o  female  : 1960    MRN: [id number]  DATE: 2022  TIME: 4:24 PM    Assessment and Plan   Pain in left toe(s) [M79 675]  1  Pain in left toe(s)  XR foot 3+ vw left    Ambulatory Referral to Orthopedic Surgery    XR toe left fifth min 2 views       Xray of left foot- No acute fracture noted on left little toe  Pending radiology report  Xray of left toe- possible left little toe fracture  Pending radiology report  Patient reports she was surprised they didn't do a left little toe xray  Patient was told I can order just a toe xray if she would like but she declined at this time  Patient reported back to office requesting a left little xray  Xray was ordered  Patient also would  Like a surgical shoe  Patient Instructions     Patient was educated on left little toe  Injury  Patient was offered crutches and surgical shoe but declined  Patients little toe was christelle taped  Patient was educated on icing and taking OTC Tylenol and anti-inflammatory for pain  Patient was given a referral for ortho  Chief Complaint     Chief Complaint   Patient presents with    Toe Injury     Patient relates "I hit my left pinky toe on a corner of a [employer] this morning at 7:30 am" Denies falling  No foot pain  History of Present Illness       Patient is here today complaining of left little toe pain  Patient reports left little toe started hurting when she hit it on the corner of a [employer]  Patient rates left little toe pain 7/10  Patient reports she has tried taking OTC Tylenol and Motrin with some relief  Denies any allergies to medications  Patient is on Eliquis  Review of Systems   Review of Systems   Constitutional: Negative  Respiratory: Negative  Cardiovascular: Negative  Musculoskeletal:        Left little toe pain   Psychiatric/Behavioral: Negative            Current Medications       Current Outpatient Medications:   acetaminophen (TYLENOL) 325 mg tablet, Take 650 mg by mouth as needed for mild pain , Disp: , Rfl:     apixaban (Eliquis) 5 mg, Take 1 tablet (5 mg total) by mouth 2 (two) times a day, Disp: 180 tablet, Rfl: 5    ascorbic acid (VITAMIN C) 1000 MG tablet, Take 1,000 mg by mouth daily, Disp: , Rfl:     butalbital-acetaminophen-caffeine (FIORICET,ESGIC) -40 mg per tablet, TAKE ONE TABLET BY MOUTH EVERY 6 HOURS AS NEEDED FOR HEADACHES, Disp: 30 tablet, Rfl: 0    Carafate 1 GM/10ML suspension, Take 10 mL (1 g total) by mouth 4 (four) times a day, Disp: 3600 mL, Rfl: 1    cephalexin (KEFLEX) 250 mg capsule, , Disp: , Rfl:     cetirizine (ZyrTEC) 10 mg tablet, Take 1 tablet (10 mg total) by mouth daily for 365 doses, Disp: 30 tablet, Rfl: 11    chlordiazepoxide-clidinium (LIBRAX) 5-2 5 mg per capsule, Take 1 capsule by mouth 2 (two) times a day before meals, Disp: 60 capsule, Rfl: 5    chlorhexidine (PERIDEX) 0 12 % solution, , Disp: , Rfl:     Cholecalciferol 25 MCG (1000 UT) tablet, Take 1,000 Units by mouth daily, Disp: , Rfl:     Dexilant 60 MG capsule, Take 1 capsule (60 mg total) by mouth daily, Disp: 90 capsule, Rfl: 1    ELDERBERRY PO, Take by mouth daily , Disp: , Rfl:     Epinastine HCl 0 05 % ophthalmic solution, Administer 1 drop to both eyes 2 (two) times a day, Disp: 5 mL, Rfl: 12    Evolocumab 140 MG/ML SOAJ, Inject 1 mL (140 mg total) under the skin every 14 (fourteen) days, Disp: 2 pen, Rfl: 3    fluticasone-vilanterol (BREO ELLIPTA) 200-25 MCG/INH inhaler, Inhale 1 puff daily Rinse mouth after use , Disp: 3 Inhaler, Rfl: 3    furosemide (LASIX) 40 mg tablet, Take 1 tablet (40 mg total) by mouth daily, Disp: 90 tablet, Rfl: 3    hydrocortisone (ANUSOL-HC) 2 5 % rectal cream, Insert rectally as needed  , Disp: 28 g, Rfl: 5    metoprolol succinate (TOPROL-XL) 100 mg 24 hr tablet, TAKE ONE TABLET BY MOUTH TWICE DAILY, Disp: 180 tablet, Rfl: 0    montelukast (SINGULAIR) 10 mg tablet, TAKE ONE TABLET BY MOUTH AT BEDTIME , Disp: 90 tablet, Rfl: 0    MULTIPLE VITAMIN PO, Take by mouth, Disp: , Rfl:     nystatin (MYCOSTATIN) powder, , Disp: , Rfl:     omeprazole (PriLOSEC) 20 mg delayed release capsule, Take 1 capsule (20 mg total) by mouth daily, Disp: 90 capsule, Rfl: 1    ondansetron (Zofran ODT) 4 mg disintegrating tablet, Take 1 tablet (4 mg total) by mouth every 6 (six) hours as needed for nausea or vomiting, Disp: 20 tablet, Rfl: 0    spironolactone (ALDACTONE) 25 mg tablet, TAKE ONE TABLET BY MOUTH TWICE DAILY, Disp: 180 tablet, Rfl: 0    sucralfate (CARAFATE) 1 g tablet, Take 1 tablet (1 g total) by mouth 4 (four) times a day, Disp: 360 tablet, Rfl: 1    traMADol (Ultram) 50 mg tablet, Take 1 tablet (50 mg total) by mouth 2 (two) times a day as needed for moderate pain, Disp: 60 tablet, Rfl: 0    triamcinolone (KENALOG) 0 1 % cream, Apply topically 2 (two) times a day, Disp: 30 g, Rfl: 1    verapamil (CALAN-SR) 120 mg CR tablet, Take 1 tablet (120 mg total) by mouth daily at bedtime, Disp: 30 tablet, Rfl: 5    Zinc 100 MG TABS, Take by mouth daily, Disp: , Rfl:     cephalexin (KEFLEX) 500 mg capsule, , Disp: , Rfl:     Cholecalciferol 25 MCG (1000 UT) tablet, Take 1,000 Units by mouth 2 (two) times a day (Patient not taking: Reported on 1/18/2022 ), Disp: , Rfl:     fexofenadine (ALLEGRA) 180 MG tablet, Take 1 tablet (180 mg total) by mouth daily IN AM, Disp: 30 tablet, Rfl: 12    LORazepam (ATIVAN) 0 5 mg tablet, Take 1 tablet (0 5 mg total) by mouth every 8 (eight) hours as needed for anxiety, Disp: 90 tablet, Rfl: 5    ondansetron (ZOFRAN) 4 mg tablet, Take 1 tablet (4 mg total) by mouth every 8 (eight) hours as needed for nausea or vomiting (Patient not taking: Reported on 1/18/2022 ), Disp: 20 tablet, Rfl: 0    phenazopyridine (PYRIDIUM) 200 mg tablet, Take 1 tablet (200 mg total) by mouth 3 (three) times a day with meals (Patient not taking: Reported on 2/1/2022 ), Disp: 10 tablet, Rfl: 0    traMADol (ULTRAM) 50 mg tablet, Take 1 tablet (50 mg total) by mouth every 8 (eight) hours as needed for severe pain, Disp: 60 tablet, Rfl: 0    Current Facility-Administered Medications:     cyanocobalamin injection 1,000 mcg, 1,000 mcg, Intramuscular, Q30 Days, Atkinson Brim, DO, 1,000 mcg at 09/22/21 1322    Vitamin B-12 SUBL, , Sublingual, Once, Atkinson Brim, DO    Current Allergies     Allergies as of 02/01/2022 - Reviewed 02/01/2022   Allergen Reaction Noted    Ace inhibitors Angioedema and Anaphylaxis 04/16/2003    Other Other (See Comments) and Anaphylaxis 01/31/2011    Benicar [olmesartan] Angioedema 09/11/2008    Hydralazine  12/29/2020    Sulfa antibiotics Other (See Comments) 01/31/2011    Valsartan Angioedema 01/22/2020            The following portions of the patient's history were reviewed and updated as appropriate: allergies, current medications, past family history, past medical history, past social history, past surgical history and problem list      Past Medical History:   Diagnosis Date    A-fib ([facility] [address] ) 03/2020    Allergic     Anxiety     Arthritis     Asthma     Colon polyp     Depression     GERD (gastroesophageal reflux disease)     Heart murmur     Hives     Hyperlipidemia     Hypertension     Infertility, female     Migraine     Palpitations     Psychiatric disorder     Wears glasses        Past Surgical History:   Procedure Laterality Date    COLONOSCOPY      EGD      EXPLORATORY LAPAROTOMY      for infertility    HAND SURGERY      Hand excision of tendon cyst    VENTRICULOPERITONEAL SHUNT      WISDOM TOOTH EXTRACTION         Family History   Problem Relation Age of Onset    Lung cancer Mother     Brain cancer Mother     Diabetes Father     Depression Father     Other Father         septic     Allergies Sister     Hashimoto's thyroiditis Sister     Abdominal aortic aneurysm Sister     Diabetes Sister     No Known Problems Sister     Hodgkin's lymphoma Brother     No Known Problems Brother     Diabetes Other          Medications have been verified  Objective   /68   Pulse 60   Temp 97 9 °F (36 6 °C) (Tympanic)   Resp 16   LMP  (LMP Unknown)   SpO2 96%   No LMP recorded (lmp unknown)  Patient is postmenopausal        Physical Exam     Physical Exam  Constitutional:       Appearance: She is normal weight  HENT:      Head: Normocephalic  Cardiovascular:      Rate and Rhythm: Normal rate and regular rhythm  Heart sounds: Normal heart sounds  Pulmonary:      Breath sounds: Normal breath sounds  Musculoskeletal:      Comments: Swelling noted over left little toe  Pain with resisted left little toes flexion and extension  NO pain over left meta-tarsals  Negative Anterior drawer and Talar tilt in left ankle  No pain over medial or lateral malleolus of left ankle  No pain or tenderness in left calf  Neurological:      General: No focal deficit present  Mental Status: She is alert and oriented to person, place, and time     Psychiatric:         Mood and Affect: Mood normal          Behavior: Behavior normal

## 2022-02-01 NOTE — PATIENT INSTRUCTIONS
Patient was educated on left little toe  Injury  Patient was offered crutches and surgical shoe but declined  Patients little toe was christelle taped  Patient was educated on icing and taking OTC Tylenol and anti-inflammatory for pain  Patient was given a referral for ortho  Toe Fracture   WHAT YOU NEED TO KNOW:   A toe fracture is a break in a bone in your toe  DISCHARGE INSTRUCTIONS:   Return to the emergency department if:   · Blood soaks through your bandage  · You have severe pain in your toe  · Your toe is cold or numb  Call your doctor if:   · You have a fever  · Your pain does not go away, even after treatment  · Your toe continues to hurt even after it has healed  · You have questions or concerns about your condition or care  Medicines: You may need any of the following:  · NSAIDs , such as ibuprofen, help decrease swelling, pain, and fever  This medicine is available with or without a doctor's order  NSAIDs can cause stomach bleeding or kidney problems in certain people  If you take blood thinner medicine, always ask your healthcare provider if NSAIDs are safe for you  Always read the medicine label and follow directions  · Prescription pain medicine  may be given  Ask your healthcare provider how to take this medicine safely  Some prescription pain medicines contain acetaminophen  Do not take other medicines that contain acetaminophen without talking to your healthcare provider  Too much acetaminophen may cause liver damage  Prescription pain medicine may cause constipation  Ask your healthcare provider how to prevent or treat constipation  · Antibiotics  treat a bacterial infection  You may need antibiotics if you have an open wound  · Take your medicine as directed  Contact your healthcare provider if you think your medicine is not helping or if you have side effects  Tell him of her if you are allergic to any medicine   Keep a list of the medicines, vitamins, and herbs you take  Include the amounts, and when and why you take them  Bring the list or the pill bottles to follow-up visits  Carry your medicine list with you in case of an emergency  Self-care:   · Rest  your toe so that it can heal  Return to normal activities as directed  · Apply ice  on your toe for 15 to 20 minutes every hour or as directed  Use an ice pack, or put crushed ice in a plastic bag  Cover it with a towel before you put it on your toe  Ice helps prevent tissue damage and decreases swelling and pain  · Elevate  your toe above the level of your heart as often as you can  This will help decrease swelling and pain  Prop your toe on pillows or blankets to keep it elevated comfortably  · Use christelle tape, an elastic bandage, or a splint  as directed  These help keep your toe in its correct position as it heals  Christelle tape means your fractured toe and the toe next to it are taped together  · Use a support device  such as a cane, crutches, walking boot, or hard soled shoe as directed  These help protect your toe and limit movement so it can heal        Follow up with your doctor as directed: You may need to return in 2 to 4 weeks  Write down your questions so you remember to ask them during your visits  © Copyright [employer] 2021 Information is for End User's use only and may not be sold, redistributed or otherwise used for commercial purposes  All illustrations and images included in CareNotes® are the copyrighted property of [employer]  or [employer]  The above information is an  only  It is not intended as medical advice for individual conditions or treatments  Talk to your doctor, nurse or pharmacist before following any medical regimen to see if it is safe and effective for you

## 2022-02-02 ENCOUNTER — TELEPHONE (OUTPATIENT)
Dept: URGENT CARE | Facility: MEDICAL CENTER | Age: 62
End: 2022-02-02

## 2022-02-02 NOTE — TELEPHONE ENCOUNTER
No acute fracture of left foot or left little toe  Patient was called  Patient was told I ordered a left foot xray to be complete in order to evaluate the whole foot and little toe  Patient stubbed her left little toe into a [employer]  Patient understood   Patient also had a left little toe xray upon request

## 2022-02-03 ENCOUNTER — APPOINTMENT (OUTPATIENT)
Dept: LAB | Facility: HOSPITAL | Age: 62
End: 2022-02-03
Payer: COMMERCIAL

## 2022-02-03 ENCOUNTER — HOSPITAL ENCOUNTER (OUTPATIENT)
Dept: NON INVASIVE DIAGNOSTICS | Facility: CLINIC | Age: 62
Discharge: HOME | End: 2022-02-03
Payer: COMMERCIAL

## 2022-02-03 DIAGNOSIS — K55.069 MESENTERIC ARTERY THROMBOSIS (HCC): ICD-10-CM

## 2022-02-03 DIAGNOSIS — M25.50 ARTHRALGIA, UNSPECIFIED JOINT: ICD-10-CM

## 2022-02-03 DIAGNOSIS — I10 BENIGN ESSENTIAL HYPERTENSION: ICD-10-CM

## 2022-02-03 DIAGNOSIS — S80.869A TICK BITE OF LOWER LEG, UNSPECIFIED LATERALITY, INITIAL ENCOUNTER: ICD-10-CM

## 2022-02-03 DIAGNOSIS — R10.12 LEFT UPPER QUADRANT PAIN: ICD-10-CM

## 2022-02-03 DIAGNOSIS — I10 ESSENTIAL HYPERTENSION: ICD-10-CM
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
## 2022-02-03 DIAGNOSIS — R10.11 RIGHT UPPER QUADRANT PAIN: ICD-10-CM

## 2022-02-03 DIAGNOSIS — I48.0 PAROXYSMAL ATRIAL FIBRILLATION (HCC): ICD-10-CM

## 2022-02-03 LAB
ALBUMIN SERPL BCP-MCNC: 4.1 G/DL (ref 3.5–5)
ALP SERPL-CCNC: 129 U/L (ref 46–116)
ALT SERPL W P-5'-P-CCNC: 28 U/L (ref 12–78)
ANION GAP SERPL CALCULATED.3IONS-SCNC: 9 MMOL/L (ref 4–13)
AST SERPL W P-5'-P-CCNC: 19 U/L (ref 5–45)
BASOPHILS # BLD AUTO: 0.05 THOUSANDS/ΜL (ref 0–0.1)
BASOPHILS NFR BLD AUTO: 1 % (ref 0–1)
BILIRUB SERPL-MCNC: 0.33 MG/DL (ref 0.2–1)
BUN SERPL-MCNC: 10 MG/DL (ref 5–25)
CALCIUM SERPL-MCNC: 9.7 MG/DL (ref 8.3–10.1)
CHLORIDE SERPL-SCNC: 101 MMOL/L (ref 100–108)
CHOLEST SERPL-MCNC: 260 MG/DL
CO2 SERPL-SCNC: 30 MMOL/L (ref 21–32)
CREAT SERPL-MCNC: 0.99 MG/DL (ref 0.6–1.3)
EOSINOPHIL # BLD AUTO: 0.12 THOUSAND/ΜL (ref 0–0.61)
EOSINOPHIL NFR BLD AUTO: 2 % (ref 0–6)
ERYTHROCYTE [DISTWIDTH] IN BLOOD BY AUTOMATED COUNT: 12.8 % (ref 11.6–15.1)
GFR SERPL CREATININE-BSD FRML MDRD: 60 ML/MIN/1.73SQ M
GLUCOSE P FAST SERPL-MCNC: 125 MG/DL (ref 65–99)
HCT VFR BLD AUTO: 40.5 % (ref 34.8–46.1)
HDLC SERPL-MCNC: 47 MG/DL
HGB BLD-MCNC: 13.2 G/DL (ref 11.5–15.4)
IMM GRANULOCYTES # BLD AUTO: 0.03 THOUSAND/UL (ref 0–0.2)
IMM GRANULOCYTES NFR BLD AUTO: 1 % (ref 0–2)
LDLC SERPL CALC-MCNC: 158 MG/DL (ref 0–100)
LIPASE SERPL-CCNC: 80 U/L (ref 73–393)
LYMPHOCYTES # BLD AUTO: 1.73 THOUSANDS/ΜL (ref 0.6–4.47)
LYMPHOCYTES NFR BLD AUTO: 30 % (ref 14–44)
MCH RBC QN AUTO: 29.4 PG (ref 26.8–34.3)
MCHC RBC AUTO-ENTMCNC: 32.6 G/DL (ref 31.4–37.4)
MCV RBC AUTO: 90 FL (ref 82–98)
MONOCYTES # BLD AUTO: 0.48 THOUSAND/ΜL (ref 0.17–1.22)
MONOCYTES NFR BLD AUTO: 8 % (ref 4–12)
NEUTROPHILS # BLD AUTO: 3.33 THOUSANDS/ΜL (ref 1.85–7.62)
NEUTS SEG NFR BLD AUTO: 58 % (ref 43–75)
NONHDLC SERPL-MCNC: 213 MG/DL
NRBC BLD AUTO-RTO: 0 /100 WBCS
PLATELET # BLD AUTO: 259 THOUSANDS/UL (ref 149–390)
PMV BLD AUTO: 9.6 FL (ref 8.9–12.7)
POTASSIUM SERPL-SCNC: 3.9 MMOL/L (ref 3.5–5.3)
PROT SERPL-MCNC: 7.4 G/DL (ref 6.4–8.2)
RBC # BLD AUTO: 4.49 MILLION/UL (ref 3.81–5.12)
SODIUM SERPL-SCNC: 140 MMOL/L (ref 136–145)
TRIGL SERPL-MCNC: 276 MG/DL
TSH SERPL DL<=0.05 MIU/L-ACNC: 2.78 UIU/ML (ref 0.36–3.74)
WBC # BLD AUTO: 5.74 THOUSAND/UL (ref 4.31–10.16)

## 2022-02-03 PROCEDURE — 83690 ASSAY OF LIPASE: CPT

## 2022-02-03 PROCEDURE — 84443 ASSAY THYROID STIM HORMONE: CPT

## 2022-02-03 PROCEDURE — 85025 COMPLETE CBC W/AUTO DIFF WBC: CPT

## 2022-02-03 PROCEDURE — 80053 COMPREHEN METABOLIC PANEL: CPT

## 2022-02-03 PROCEDURE — 36415 COLL VENOUS BLD VENIPUNCTURE: CPT

## 2022-02-03 PROCEDURE — 86618 LYME DISEASE ANTIBODY: CPT

## 2022-02-03 PROCEDURE — 80061 LIPID PANEL: CPT

## 2022-02-03 PROCEDURE — 93975 VASCULAR STUDY: CPT

## 2022-02-03 PROCEDURE — 93975 VASCULAR STUDY: CPT | Performed by: SURGERY

## 2022-02-04 LAB — B BURGDOR IGG+IGM SER-ACNC: -1

## 2022-02-08 ENCOUNTER — CONSULT (OUTPATIENT)
Dept: DERMATOLOGY | Facility: CLINIC | Age: 62
End: 2022-02-08
Payer: COMMERCIAL

## 2022-02-08 VITALS — WEIGHT: 212 LBS | BODY MASS INDEX: 36.39 KG/M2 | TEMPERATURE: 96.9 F

## 2022-02-08 DIAGNOSIS — L82.1 SEBORRHEIC KERATOSIS: ICD-10-CM

## 2022-02-08 DIAGNOSIS — R21 RASH: ICD-10-CM

## 2022-02-08 DIAGNOSIS — D18.01 CHERRY ANGIOMA: Primary | ICD-10-CM

## 2022-02-08 PROCEDURE — 99244 OFF/OP CNSLTJ NEW/EST MOD 40: CPT | Performed by: STUDENT IN AN ORGANIZED HEALTH CARE EDUCATION/TRAINING PROGRAM

## 2022-02-08 RX ORDER — PIMECROLIMUS 10 MG/G
CREAM TOPICAL
Qty: 100 G | Refills: 3 | Status: SHIPPED | OUTPATIENT
Start: 2022-02-08

## 2022-02-08 NOTE — PROGRESS NOTES
[facility] Note     Patient Name: Abdirizak Burdick  Encounter Date: 02/08/2022     Have you been cared for by a [facility] in the last 3 years and, if so, which one? No    · Have you traveled outside of the [address] [place] [address] in the past 3 months or outside of the [facility] area in the last 2 weeks? No     May we call your Preferred Phone number to discuss your specific medical information? Yes     May we leave a detailed message that includes your specific medical information? Yes      Today's Chief Concerns:   Concern #1:  Rash on breast, eyes and lower legs     Past Medical History:  Have you personally ever had or currently have any of the following? · Skin cancer (such as Melanoma, Basal Cell Carcinoma, Squamous Cell Carcinoma? (If Yes, please provide more detail)- No  · Eczema: No  · Psoriasis: No  · HIV/AIDS: No  · Hepatitis B or C: No  · Tuberculosis: No  · Systemic Immunosuppression such as Diabetes, Biologic or Immunotherapy, Chemotherapy, Organ Transplantation, Bone Marrow Transplantation (If YES, please provide more detail): No  · Radiation Treatment (If YES, please provide more detail): No  · Any other major medical conditions/concerns? (If Yes, which types)- No    Social History:     What is/was your primary occupation? n/a     What are your hobbies/past-times? n/a    Family History:  Have any of your "first degree relatives" (parent, brother, sister, or child) had any of the following       · Skin cancer such as Melanoma or Merkel Cell Carcinoma or Pancreatic Cancer? No  · Eczema, Asthma, Hay Fever or Seasonal Allergies: No  · Psoriasis or Psoriatic Arthritis: No  · Do any other medical conditions seem to run in your family? If Yes, what condition and which relatives?   No    Current Medications:   (please update all dermatological medications before printing patient's AVS!)      Current Outpatient Medications:     acetaminophen (TYLENOL) 325 mg tablet, Take 650 mg by mouth as needed for mild pain , Disp: , Rfl:     apixaban (Eliquis) 5 mg, Take 1 tablet (5 mg total) by mouth 2 (two) times a day, Disp: 180 tablet, Rfl: 5    ascorbic acid (VITAMIN C) 1000 MG tablet, Take 1,000 mg by mouth daily, Disp: , Rfl:     butalbital-acetaminophen-caffeine (FIORICET,ESGIC) -40 mg per tablet, TAKE ONE TABLET BY MOUTH EVERY 6 HOURS AS NEEDED FOR HEADACHES, Disp: 30 tablet, Rfl: 0    Carafate 1 GM/10ML suspension, Take 10 mL (1 g total) by mouth 4 (four) times a day, Disp: 3600 mL, Rfl: 1    cetirizine (ZyrTEC) 10 mg tablet, Take 1 tablet (10 mg total) by mouth daily for 365 doses, Disp: 30 tablet, Rfl: 11    chlordiazepoxide-clidinium (LIBRAX) 5-2 5 mg per capsule, Take 1 capsule by mouth 2 (two) times a day before meals, Disp: 60 capsule, Rfl: 5    chlorhexidine (PERIDEX) 0 12 % solution, , Disp: , Rfl:     Cholecalciferol 25 MCG (1000 UT) tablet, Take 1,000 Units by mouth daily, Disp: , Rfl:     Dexilant 60 MG capsule, Take 1 capsule (60 mg total) by mouth daily, Disp: 90 capsule, Rfl: 1    ELDERBERRY PO, Take by mouth daily , Disp: , Rfl:     Epinastine HCl 0 05 % ophthalmic solution, Administer 1 drop to both eyes 2 (two) times a day, Disp: 5 mL, Rfl: 12    Evolocumab 140 MG/ML SOAJ, Inject 1 mL (140 mg total) under the skin every 14 (fourteen) days, Disp: 2 pen, Rfl: 3    fexofenadine (ALLEGRA) 180 MG tablet, Take 1 tablet (180 mg total) by mouth daily IN AM, Disp: 30 tablet, Rfl: 12    fluticasone-vilanterol (BREO ELLIPTA) 200-25 MCG/INH inhaler, Inhale 1 puff daily Rinse mouth after use , Disp: 3 Inhaler, Rfl: 3    furosemide (LASIX) 40 mg tablet, Take 1 tablet (40 mg total) by mouth daily, Disp: 90 tablet, Rfl: 3    LORazepam (ATIVAN) 0 5 mg tablet, Take 1 tablet (0 5 mg total) by mouth every 8 (eight) hours as needed for anxiety, Disp: 90 tablet, Rfl: 5    metoprolol succinate (TOPROL-XL) 100 mg 24 hr tablet, TAKE ONE TABLET BY MOUTH TWICE DAILY, Disp: 180 tablet, Rfl: 0    montelukast (SINGULAIR) 10 mg tablet, TAKE ONE TABLET BY MOUTH AT BEDTIME , Disp: 90 tablet, Rfl: 0    MULTIPLE VITAMIN PO, Take by mouth, Disp: , Rfl:     nystatin (MYCOSTATIN) powder, , Disp: , Rfl:     omeprazole (PriLOSEC) 20 mg delayed release capsule, Take 1 capsule (20 mg total) by mouth daily, Disp: 90 capsule, Rfl: 1    ondansetron (Zofran ODT) 4 mg disintegrating tablet, Take 1 tablet (4 mg total) by mouth every 6 (six) hours as needed for nausea or vomiting, Disp: 20 tablet, Rfl: 0    phenazopyridine (PYRIDIUM) 200 mg tablet, Take 1 tablet (200 mg total) by mouth 3 (three) times a day with meals, Disp: 10 tablet, Rfl: 0    spironolactone (ALDACTONE) 25 mg tablet, TAKE ONE TABLET BY MOUTH TWICE DAILY, Disp: 180 tablet, Rfl: 0    sucralfate (CARAFATE) 1 g tablet, Take 1 tablet (1 g total) by mouth 4 (four) times a day, Disp: 360 tablet, Rfl: 1    traMADol (Ultram) 50 mg tablet, Take 1 tablet (50 mg total) by mouth 2 (two) times a day as needed for moderate pain, Disp: 60 tablet, Rfl: 0    traMADol (ULTRAM) 50 mg tablet, Take 1 tablet (50 mg total) by mouth every 8 (eight) hours as needed for severe pain, Disp: 60 tablet, Rfl: 0    verapamil (CALAN-SR) 120 mg CR tablet, Take 1 tablet (120 mg total) by mouth daily at bedtime, Disp: 30 tablet, Rfl: 5    Zinc 100 MG TABS, Take by mouth daily, Disp: , Rfl:     cephalexin (KEFLEX) 250 mg capsule, , Disp: , Rfl:     cephalexin (KEFLEX) 500 mg capsule, , Disp: , Rfl:     Cholecalciferol 25 MCG (1000 UT) tablet, Take 1,000 Units by mouth 2 (two) times a day (Patient not taking: Reported on 1/18/2022 ), Disp: , Rfl:     hydrocortisone (ANUSOL-HC) 2 5 % rectal cream, Insert rectally as needed   (Patient not taking: Reported on 2/8/2022 ), Disp: 28 g, Rfl: 5    ondansetron (ZOFRAN) 4 mg tablet, Take 1 tablet (4 mg total) by mouth every 8 (eight) hours as needed for nausea or vomiting (Patient not taking: Reported on 1/18/2022 ), Disp: 20 tablet, Rfl: 0    triamcinolone (KENALOG) 0 1 % cream, Apply topically 2 (two) times a day (Patient not taking: Reported on 2/8/2022 ), Disp: 30 g, Rfl: 1    Current Facility-Administered Medications:     cyanocobalamin injection 1,000 mcg, 1,000 mcg, Intramuscular, Q30 Days, Meg Nunnery, DO, 1,000 mcg at 09/22/21 1322    Vitamin B-12 SUBL, , Sublingual, Once, Meg Nunnery, DO      Review of Systems:  Have you recently had or currently have any of the following? If YES, what are you doing for the problem? · Fever, chills or unintended weight loss: No  · Sudden loss or change in your vision: No  · Nausea, vomiting or blood in your stool: YES, nausea and vomiting   · Painful or swollen joints: YES,   · Wheezing or cough: YES,   · Changing mole or non-healing wound: YES, on hands non healing   · Nosebleeds: No   · Excessive sweating: YES,   · Easy or prolonged bleeding? YES, on Eliquis 5 mg   · Over the last 2 weeks, how often have you been bothered by the following problems? · Taking little interest or pleasure in doing things: 1 - Not at All  · Feeling down, depressed, or hopeless: 1 - Not at All   · Rapid heartbeat with epinephrine:  YES,     · FEMALES ONLY:    · Are you pregnant or planning to become pregnant? No  · Are you currently or planning to be nursing or breast feeding? No    · Any known allergies? Allergies   Allergen Reactions    Ace Inhibitors Angioedema and Anaphylaxis     Anaphylaxis    Other Other (See Comments) and Anaphylaxis     Other reaction(s): angioadema  Other reaction(s): Other (See Comments)  Preservatives- itching throat closes, hi  Other reaction(s): angioadema  E Z Cat - hives throat closes itching,  Preservatives- itching throat closes, hi    Benicar [Olmesartan] Angioedema     See Allergy note from 9/11/2008        Hydralazine      Lip swelling    Sulfa Antibiotics Other (See Comments)     stuffiness,itching,hives,throat closing    Valsartan Angioedema         Physical Exam:     Was a chaperone (Derm Clinical Assistant) present throughout the entire Physical Exam? Yes    o     CONSTITUTIONAL:   Vitals:    02/08/22 1115   Temp: (!) 96 9 °F (36 1 °C)   TempSrc: Temporal   Weight: 96 2 kg (212 lb)     PSYCH: Normal mood and affect  EYES: Normal conjunctiva  ENT: Normal lips and oral mucosa  CARDIOVASCULAR: No edema  RESPIRATORY: Normal respirations  HEME/LYMPH/IMMUNO:  No ostensilble subQ swelling except as noted below in "ASSESSMENT AND PLAN BY DIAGNOSIS"    SKIN:  FULL ORGAN SYSTEM EXAM  Hair, Scalp, Ears, Face Normal except as noted below in Assessment   Neck Normal except as noted below in Assessment   Right Arm/Hand/Fingers Normal except as noted below in Assessment   Left Arm/Hand/Fingers Normal except as noted below in Assessment   Chest/Breasts/Axillae Viewed areas Normal except as noted below in Assessment   Abdomen, Umbilicus Normal except as noted below in Assessment   Back/Spine Normal except as noted below in Assessment   Groin/Genitalia/Buttocks NOT EXAMINED--PT DEFERRED   Right Leg, Foot, Toes Normal except as noted below in Assessment   Left Leg, Foot, Toes Normal except as noted below in Assessment        Assessment and Plan by Diagnosis:    History of Present Condition:     Duration:  How long has this been an issue for you?    o  Several months    Location Affected:  Where on the body is this affecting you?    o  Eyes, breast area, and lower legs    Quality:  Is there any bleeding, pain, itch, burning/irritation, or redness associated with the skin lesion? o  itching, redness, irritation    Severity:  Describe any bleeding, pain, itch, burning/irritation, or redness on a scale of 1 to 10 (with 10 being the worst)  o  5-10   Timing:  Does this condition seem to be there pretty constantly or do you notice it more at specific times throughout the day?     o  comes and goes    Context:  Have you ever noticed that this condition seems to be associated with specific activities you do?    o  unsure if related to medications   Modifying Factors:    o Anything that seems to make the condition worse?    -  unsure if related to medications  o What have you tried to do to make the condition better?    -  Triamcinolone 0 1% cream, hydrocortisone, and clobetasol 0 05% ointment, Keflex, tacrolimus (protopic), Fluconazole 150mg    Associated Signs and Symptoms:  Does this skin lesion seem to be associated with any of the following:  o  SL AMB DERM SIGNS AND SYMPTOMS: Redness and Itching and Scratching     1  RASH: nonspecific eczema of face, hands + possibly candidiasis between breasts + venous dermatitis on lower legs    Physical Exam:   (Anatomic Location); (Size and Morphological Description); (Differential Diagnosis):  o No rash on eyelids  o Mild scaly thin papules on right hand  o Red well demarcated plaques on breasts (pic taken)  o Scaly red plaques on lower legs   Pertinent Positives:   Pertinent Negatives: Additional History of Present Condition:  Rash on chest started after last Monday after 7 days of abx (keflex) for interstitial cystitis  Urologist prescribed diflucan a few days ago x1 dose and helped rash    Assessment and Plan:  Based on a thorough discussion of this condition and the management approach to it (including a comprehensive discussion of the known risks, side effects and potential benefits of treatment), the patient (family) agrees to implement the following specific plan:   Ketoconazole cream 2x/day to affected areas on breast for 4 weeks (pt has)   Apply Elidel 1% cream 2x/day for eczema rash on eyelids and hands   Apply triamcinolone 0 1% cream 2x/day on legs for 1 week (pt has)   Also recommend patient to start elevating legs to help with swelling  Start using Dove moisturizer bar soap in the shower, take warm luke showers avoid really hot showers       Use moisturizer like Eucerin,Cerave, Vanicream or Aveeno, Vaseline, and Aquaphor Cream 3 times a day for the dry skin         Recommend patient to follow up in 4 months for check up  2  CHERRY ANGIOMAS    Physical Exam:   Anatomic Location Affected:  Trunk and extremities    Morphological Description:  Scattered cherry red, 1-4 mm papules   Pertinent Positives:   Pertinent Negatives: Additional History of Present Condition:  Present for years  Assessment and Plan:  Based on a thorough discussion of this condition and the management approach to it (including a comprehensive discussion of the known risks, side effects and potential benefits of treatment), the patient (family) agrees to implement the following specific plan:    Reassure benign  Assessment and Plan:    Cherry angioma, also known as [employer] spots, are benign vascular skin lesions  A "cherry angioma" is a firm red, blue or purple papule, 0 1-1 cm in diameter  When thrombosed, they can appear black in colour until evaluated with a dermatoscope when the red or purple colour is more easily seen  Cherry angioma may develop on any part of the body but most often appear on the scalp, face, lips and trunk  An angioma is due to proliferating endothelial cells; these are the cells that line the inside of a blood vessel  Angiomas can arise in early life or later in life; the most common type of angioma is a cherry angioma  Cherry angiomas are very common in males and females of any age or race  They are more noticeable in white skin than in skin of colour  They markedly increase in number from about the age of 36  There may be a family history of similar lesions  Eruptive cherry angiomas have been rarely reported to be associated with internal malignancy  The cause of angiomas is unknown   Genetic analysis of cherry angiomas has shown that they frequently carry specific somatic missense mutations in the GNAQ and GNA11 (Q209H) genes, which are involved in other vascular and melanocytic proliferations  Cherry angioma is usually diagnosed clinically and no investigations are necessary for the majority of lesions  It has a characteristic red-clod or lobular pattern on dermatoscopy (called lacunar pattern using conventional pattern analysis)  When there is uncertainty about the diagnosis, a biopsy may be performed  The angioma is composed of venules in a thickened papillary dermis  Collagen bundles may be prominent between the lobules  Cherry angiomas are harmless, so they do not usually have to be treated  Occasionally, they are removed to exclude a malignant skin lesion such as a nodular melanoma or because they are irritated or bleeding (and a subsequent risk for infection)  To decrease friction over the lesions, we recommend Neutrogena Daily Defense SPF 50+ at least 3 times a day  3  SEBORRHEIC KERATOSIS; NON-INFLAMED    Physical Exam:   Anatomic Location Affected:  Trunk and extremities    Morphological Description:  Flat and raised, waxy, smooth to warty textured, yellow to brownish-grey to dark brown to blackish, discrete, "stuck-on" appearing papules   Pertinent Positives:   Pertinent Negatives: Additional History of Present Condition:  Patient reports new bumps on the skin  Denies itch, burn, pain, bleeding or ulceration  Present constantly; nothing seems to make it worse or better  No prior treatment  Assessment and Plan:  Based on a thorough discussion of this condition and the management approach to it (including a comprehensive discussion of the known risks, side effects and potential benefits of treatment), the patient (family) agrees to implement the following specific plan:   Reassure benign     Seborrheic Keratosis  A seborrheic keratosis is a harmless warty spot that appears during adult life as a common sign of skin aging    Seborrheic keratoses can arise on any area of skin, covered or uncovered, with the usual exception of the palms and soles  They do not arise from mucous membranes  Seborrheic keratoses can have highly variable appearance  Seborrheic keratoses are extremely common  It has been estimated that over 90% of adults over the age of 61 years have one or more of them  They occur in males and females of all races, typically beginning to erupt in the 35s or 45s  They are uncommon under the age of 21 years  The precise cause of seborrhoeic keratoses is not known  Seborrhoeic keratoses are considered degenerative in nature  As time goes by, seborrheic keratoses tend to become more numerous  Some people inherit a tendency to develop a very large number of them; some people may have hundreds of them  The name "seborrheic keratosis" is misleading, because these lesions are not limited to a seborrhoeic distribution (scalp, mid-face, chest, upper back), nor are they formed from sebaceous glands, nor are they associated with sebum -- which is greasy  Seborrheic keratosis may also be called "SK," "Seb K," "basal cell papilloma," "senile wart," or "barnacle "      Researchers have noted:   Eruptive seborrhoeic keratoses can follow sunburn or dermatitis   Skin friction may be the reason they appear in body folds   Viral cause (e g , human papillomavirus) seems unlikely   Stable and clonal mutations or activation of FRFR3, PIK3CA, GUERA, AKT1 and EGFR genes are found in seborrhoeic keratoses   Seborrhoeic keratosis can arise from solar lentigo   FRFR3 mutations also arise in solar lentigines  These mutations are associated with increased age and location on the head and neck, suggesting a role of ultraviolet radiation in these lesions   Seborrheic keratoses do not harbour tumour suppressor gene mutations   Epidermal growth factor receptor inhibitors, which are used to treat some cancers, often result in an increase in verrucal (warty) keratoses      There is no easy way to remove multiple lesions on a single occasion  Unless a specific lesion is "inflamed" and is causing pain or stinging/burning or is bleeding, most insurance companies do not authorize treatment      Scribe Attestation    I,:  Georgette Spicer MA am acting as a scribe while in the presence of the attending physician :       I,:  Keli Ortega MD personally performed the services described in this documentation    as scribed in my presence :

## 2022-02-08 NOTE — PATIENT INSTRUCTIONS
1  RASH: nonspecific eczema of face, hands + possibly candidiasis between breasts + venous dermatitis on lower legs        Assessment and Plan:  Based on a thorough discussion of this condition and the management approach to it (including a comprehensive discussion of the known risks, side effects and potential benefits of treatment), the patient (family) agrees to implement the following specific plan:   Ketoconazole cream 2x/day to affected areas on breast for 4 weeks (pt has)   Apply Elidel 1% cream 2x/day for eczema rash on eyelids and hands   Apply triamcinolone 0 1% cream 2x/day on legs for 1 week (pt has)   Also recommend patient to start using compression stockings during the day to help with swelling  Start using Dove moisturizer bar soap in the shower, take warm luke showers avoid really hot showers     Use moisturizer like Eucerin,Cerave, Vanicream or Aveeno, Vaseline, and Aquaphor Cream 3 times a day for the dry skin         Recommend patient to follow up in 4 months for check up  2  CHERRY ANGIOMAS        Assessment and Plan:  Based on a thorough discussion of this condition and the management approach to it (including a comprehensive discussion of the known risks, side effects and potential benefits of treatment), the patient (family) agrees to implement the following specific plan:    Reassure benign  Assessment and Plan:    Cherry angioma, also known as [employer] spots, are benign vascular skin lesions  A "cherry angioma" is a firm red, blue or purple papule, 0 1-1 cm in diameter  When thrombosed, they can appear black in colour until evaluated with a dermatoscope when the red or purple colour is more easily seen  Cherry angioma may develop on any part of the body but most often appear on the scalp, face, lips and trunk  An angioma is due to proliferating endothelial cells; these are the cells that line the inside of a blood vessel      Angiomas can arise in early life or later in life; the most common type of angioma is a cherry angioma  Cherry angiomas are very common in males and females of any age or race  They are more noticeable in white skin than in skin of colour  They markedly increase in number from about the age of 36  There may be a family history of similar lesions  Eruptive cherry angiomas have been rarely reported to be associated with internal malignancy  The cause of angiomas is unknown  Genetic analysis of cherry angiomas has shown that they frequently carry specific somatic missense mutations in the GNAQ and GNA11 (Q209H) genes, which are involved in other vascular and melanocytic proliferations  Cherry angioma is usually diagnosed clinically and no investigations are necessary for the majority of lesions  It has a characteristic red-clod or lobular pattern on dermatoscopy (called lacunar pattern using conventional pattern analysis)  When there is uncertainty about the diagnosis, a biopsy may be performed  The angioma is composed of venules in a thickened papillary dermis  Collagen bundles may be prominent between the lobules  Cherry angiomas are harmless, so they do not usually have to be treated  Occasionally, they are removed to exclude a malignant skin lesion such as a nodular melanoma or because they are irritated or bleeding (and a subsequent risk for infection)  To decrease friction over the lesions, we recommend Neutrogena Daily Defense SPF 50+ at least 3 times a day        3  SEBORRHEIC KERATOSIS; NON-INFLAMED        Assessment and Plan:  Based on a thorough discussion of this condition and the management approach to it (including a comprehensive discussion of the known risks, side effects and potential benefits of treatment), the patient (family) agrees to implement the following specific plan:   Reassure benign     Seborrheic Keratosis  A seborrheic keratosis is a harmless warty spot that appears during adult life as a common sign of skin aging  Seborrheic keratoses can arise on any area of skin, covered or uncovered, with the usual exception of the palms and soles  They do not arise from mucous membranes  Seborrheic keratoses can have highly variable appearance  Seborrheic keratoses are extremely common  It has been estimated that over 90% of adults over the age of 61 years have one or more of them  They occur in males and females of all races, typically beginning to erupt in the 35s or 45s  They are uncommon under the age of 21 years  The precise cause of seborrhoeic keratoses is not known  Seborrhoeic keratoses are considered degenerative in nature  As time goes by, seborrheic keratoses tend to become more numerous  Some people inherit a tendency to develop a very large number of them; some people may have hundreds of them  The name "seborrheic keratosis" is misleading, because these lesions are not limited to a seborrhoeic distribution (scalp, mid-face, chest, upper back), nor are they formed from sebaceous glands, nor are they associated with sebum -- which is greasy  Seborrheic keratosis may also be called "SK," "Seb K," "basal cell papilloma," "senile wart," or "barnacle "      Researchers have noted:   Eruptive seborrhoeic keratoses can follow sunburn or dermatitis   Skin friction may be the reason they appear in body folds   Viral cause (e g , human papillomavirus) seems unlikely   Stable and clonal mutations or activation of FRFR3, PIK3CA, GUERA, AKT1 and EGFR genes are found in seborrhoeic keratoses   Seborrhoeic keratosis can arise from solar lentigo   FRFR3 mutations also arise in solar lentigines   These mutations are associated with increased age and location on the head and neck, suggesting a role of ultraviolet radiation in these lesions   Seborrheic keratoses do not harbour tumour suppressor gene mutations   Epidermal growth factor receptor inhibitors, which are used to treat some cancers, often result in an increase in verrucal (warty) keratoses  There is no easy way to remove multiple lesions on a single occasion  Unless a specific lesion is "inflamed" and is causing pain or stinging/burning or is bleeding, most insurance companies do not authorize treatment

## 2022-02-11 ENCOUNTER — TELEPHONE (OUTPATIENT)
Dept: CARDIOLOGY CLINIC | Facility: CLINIC | Age: 62
End: 2022-02-11

## 2022-02-11 NOTE — TELEPHONE ENCOUNTER
Pt intolerant to multiple BP meds  D/c verapamil, hopefully if ADRs resolve, BP should improve     Encourage very low sodium diet, increase green/leafy veggies in diet, weight loss, can see nutritionist

## 2022-02-11 NOTE — TELEPHONE ENCOUNTER
Erika Latif called saying the verapamil is not working  Her blood pressure has been high 153/108, 153/95, 177/89 today it was 162/93 with hr 70  She said she is taking it at night  It is making her heart race, making her extremely tired, she is sweating a lot and she has a very bad headache   Please call pt

## 2022-02-15 DIAGNOSIS — M79.602 LEFT ARM PAIN: ICD-10-CM

## 2022-02-15 RX ORDER — TRAMADOL HYDROCHLORIDE 50 MG/1
50 TABLET ORAL EVERY 8 HOURS PRN
Qty: 60 TABLET | Refills: 0 | Status: SHIPPED | OUTPATIENT
Start: 2022-02-15 | End: 2022-03-02 | Stop reason: SDUPTHER

## 2022-02-16 ENCOUNTER — TELEPHONE (OUTPATIENT)
Dept: DERMATOLOGY | Facility: CLINIC | Age: 62
End: 2022-02-16

## 2022-02-17 ENCOUNTER — TELEPHONE (OUTPATIENT)
Dept: OBGYN CLINIC | Facility: MEDICAL CENTER | Age: 62
End: 2022-02-17

## 2022-02-18 ENCOUNTER — OFFICE VISIT (OUTPATIENT)
Dept: DERMATOLOGY | Facility: CLINIC | Age: 62
End: 2022-02-18
Payer: COMMERCIAL

## 2022-02-18 ENCOUNTER — TELEPHONE (OUTPATIENT)
Dept: CARDIOLOGY CLINIC | Facility: CLINIC | Age: 62
End: 2022-02-18

## 2022-02-18 ENCOUNTER — OFFICE VISIT (OUTPATIENT)
Dept: FAMILY MEDICINE CLINIC | Facility: CLINIC | Age: 62
End: 2022-02-18
Payer: COMMERCIAL

## 2022-02-18 VITALS
HEIGHT: 64 IN | TEMPERATURE: 96.9 F | WEIGHT: 212.6 LBS | SYSTOLIC BLOOD PRESSURE: 138 MMHG | DIASTOLIC BLOOD PRESSURE: 82 MMHG | BODY MASS INDEX: 36.29 KG/M2

## 2022-02-18 VITALS — TEMPERATURE: 96.7 F | HEIGHT: 64 IN | WEIGHT: 207 LBS | BODY MASS INDEX: 35.34 KG/M2

## 2022-02-18 DIAGNOSIS — N30.10 INTERSTITIAL CYSTITIS: Primary | ICD-10-CM

## 2022-02-18 DIAGNOSIS — L30.4 INTERTRIGO: Primary | ICD-10-CM

## 2022-02-18 PROCEDURE — 99213 OFFICE O/P EST LOW 20 MIN: CPT | Performed by: FAMILY MEDICINE

## 2022-02-18 PROCEDURE — 99213 OFFICE O/P EST LOW 20 MIN: CPT | Performed by: DERMATOLOGY

## 2022-02-18 RX ORDER — NITROFURANTOIN 25; 75 MG/1; MG/1
100 CAPSULE ORAL 2 TIMES DAILY
Qty: 14 CAPSULE | Refills: 0 | Status: SHIPPED | OUTPATIENT
Start: 2022-02-18 | End: 2022-02-25

## 2022-02-18 RX ORDER — FLUCONAZOLE 150 MG/1
150 TABLET ORAL ONCE
Qty: 1 TABLET | Refills: 0 | Status: SHIPPED | OUTPATIENT
Start: 2022-02-18 | End: 2022-02-18

## 2022-02-18 RX ORDER — KETOCONAZOLE 20 MG/G
CREAM TOPICAL DAILY
Qty: 60 G | Refills: 2 | Status: SHIPPED | OUTPATIENT
Start: 2022-02-18

## 2022-02-18 NOTE — PROGRESS NOTES
Assessment/Plan:  Referral to Urogynecology       Diagnoses and all orders for this visit:    Interstitial cystitis  -     nitrofurantoin (MACROBID) 100 mg capsule; Take 1 capsule (100 mg total) by mouth 2 (two) times a day for 7 days  -     Ambulatory Referral to Urogynecology; Future  -     fluconazole (DIFLUCAN) 150 mg tablet; Take 1 tablet (150 mg total) by mouth once for 1 dose            Subjective:        Patient ID: Marcia Gilbert is a 57 y o  female  Patient is here with history of interstitial cystitis  Patient for since lesion in January  Patient was going for installation on Tuesday but did receive  Patient did some nitrates and leukocytes noted on urinalysis  Patient with more pain  Patient is using tramadol  Patient did have nausea but no vomiting  Patient was sweaty and chills  Patient did call Urology but no call back  The following portions of the patient's history were reviewed and updated as appropriate: allergies, current medications, past family history, past medical history, past social history, past surgical history and problem list       Review of Systems   Constitutional: Positive for chills  HENT: Negative  Eyes: Negative  Respiratory: Negative  Cardiovascular: Negative  Gastrointestinal: Positive for nausea  Endocrine: Negative  Genitourinary: Positive for difficulty urinating, dysuria, frequency and urgency  Musculoskeletal: Negative  Skin: Negative  Allergic/Immunologic: Negative  Neurological: Negative  Hematological: Negative  Psychiatric/Behavioral: Negative  Objective:      BMI Counseling: Body mass index is 36 49 kg/m²  The BMI is above normal  Nutrition recommendations include decreasing portion sizes  Exercise recommendations include exercising 3-5 times per week  Rationale for BMI follow-up plan is due to patient being overweight or obese               /82 (BP Location: Left arm, Patient Position: Sitting, Cuff Size: Large)   Temp (!) 96 9 °F (36 1 °C) (Tympanic)   Ht 5' 4" (1 626 m)   Wt 96 4 kg (212 lb 9 6 oz)   LMP  (LMP Unknown)   BMI 36 49 kg/m²          Physical Exam  Constitutional:       Appearance: Normal appearance  Cardiovascular:      Rate and Rhythm: Normal rate and regular rhythm  Pulses: Normal pulses  Heart sounds: Normal heart sounds  Pulmonary:      Effort: Pulmonary effort is normal       Breath sounds: Normal breath sounds  Neurological:      Mental Status: She is alert  Psychiatric:         Mood and Affect: Mood normal          Behavior: Behavior normal          Thought Content:  Thought content normal

## 2022-02-18 NOTE — TELEPHONE ENCOUNTER
Pimecrolimus DENIED by [employer]    Denied due to patient having a different primary pharmacy benefit and they would need documentation of a denial from that primary insurance  Spoke to patient  She states she has been dealing with the pharmacy in reference to this issue  She no longer has the primary pharmacy benefits they are referring to and only has [employer]  She needs to obtain a cancellation of benefits from previous insurance company  However, patient did already  the pimecrolimus (Elidel) cream   Patient also states that the rash has worsened and now in the groin area  She has been applying a cream that she was prescribed on the groin area  Explained to patient that there are some topical medications that are not recommended for use in groin area  Offered patient a follow up appt for today due to symptoms and self treating  Pt will try to make the 3:15 pm appt today, but she has another appt at 1:15 pm  She will call if she is unable to make it based on timing of first appt

## 2022-02-18 NOTE — PATIENT INSTRUCTIONS
Assessment and Plan:  Based on a thorough discussion of this condition and the management approach to it (including a comprehensive discussion of the known risks, side effects and potential benefits of treatment), the patient (family) agrees to implement the following specific plan:   Start Ketoconazole Cream twice a day to all affected areas as needed     Assessment and Plan  Intertrigo describes a rash in the flexures or body folds, such as behind the ears, in the folds of the neck, under the arms (axillae), under a protruding abdomen, in the groin, between the buttocks, in the finger webs or toe spaces  Although intertrigo may affect one skin fold, it is common for it to involve multiple sites  Intertrigo can affect males and females of any age  It is particularly common in people that are overweight or obese (see metabolic syndrome)  Other contributing factors are:   Genetic tendency to skin disease   Hyperhidrosis (excessive sweating)    Intertrigo can be acute (recent onset), relapsing (recurrent), or chronic (present for more than 6 weeks)  The exact appearance and behaviour depends on the underlying cause or causes  The skin affected by intertrigo is inflamed, ie reddened and uncomfortable  It may become moist and macerated, leading to fissuring (cracks) and peeling  Intertrigo is due to genetic and environmental factors   Flexural skin has relatively high surface temperature   Moisture from insensible water loss and sweating cannot evaporate due to occlusion   Friction from movement of adjacent skin results in chafing  The microorganisms that are normally resident on flexural skin, the microbiome, include corynebacteria, other bacteria and yeasts  These multiply in warm moist environments and may cause disease  We can classify intertrigo into infectious and inflammatory origin but there is often overlap     Infections tend to be unilateral and asymmetrical    Inflammatory disorders tend to be symmetrical affecting armpits, groins, under the breasts and the abdominal folds, except atopic dermatitis, which more often arises on the neck, and in elbow and knee creases  Investigations may be necessary to determine the cause of intertrigo   A swab for microscopy and culture of bacteria (microbiology)   A scraping for microscopy and culture of fungi (mycology)   A skin biopsy may be performed for histopathology if the skin condition is unusual or fails to respond to treatment  What is the treatment for intertrigo? Treatment depends on the underlying cause, if identified, and on which micro-organisms are present in the rash  Combinations are common   Sweating may be reduced with a gentle antiperspirant   Physical exertion should be followed by a bath and completely drying the skin folds using a hair dryer on cool setting, soft towel and/or corn starch powder   Triple paste contains petrolatum, zinc oxide, and aluminum acetate solution to reduce friction, irritation and sweating   Bacteria may be treated with topical antibiotics such as fusidic acid cream, mupirocin ointment, or oral antibiotics such as flucloxacillin and erythromycin   Yeasts and fungi may be treated with topical antifungals such as clotrimazole and terbinafine cream or oral antifungal agents such as itraconazole or terbinafine   Inflammatory skin diseases are often treated with low potency topical steroid creams such as hydrocortisone  More potent steroids are usually avoided in the flexures because they may cause skin thinning resulting in stretch marks (striae) and even ulcers  Calcineurin inhibitors such as tacrolimus ointment or pimecrolimus cream may also prove effective

## 2022-02-18 NOTE — PROGRESS NOTES
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:  Rash on groin     Current Medications:    Current Outpatient Medications:     acetaminophen (TYLENOL) 325 mg tablet, Take 650 mg by mouth as needed for mild pain , Disp: , Rfl:     apixaban (Eliquis) 5 mg, Take 1 tablet (5 mg total) by mouth 2 (two) times a day, Disp: 180 tablet, Rfl: 5    ascorbic acid (VITAMIN C) 1000 MG tablet, Take 1,000 mg by mouth daily, Disp: , Rfl:     butalbital-acetaminophen-caffeine (FIORICET,ESGIC) -40 mg per tablet, Take 1 tablet by mouth every 6 (six) hours as needed for headaches, Disp: 30 tablet, Rfl: 2    Carafate 1 GM/10ML suspension, Take 10 mL (1 g total) by mouth 4 (four) times a day, Disp: 3600 mL, Rfl: 1    cetirizine (ZyrTEC) 10 mg tablet, Take 1 tablet (10 mg total) by mouth daily for 365 doses, Disp: 30 tablet, Rfl: 11    chlordiazepoxide-clidinium (LIBRAX) 5-2 5 mg per capsule, Take 1 capsule by mouth 2 (two) times a day before meals, Disp: 60 capsule, Rfl: 5    chlorhexidine (PERIDEX) 0 12 % solution, , Disp: , Rfl:     Cholecalciferol 25 MCG (1000 UT) tablet, Take 1,000 Units by mouth daily, Disp: , Rfl:     Dexilant 60 MG capsule, Take 1 capsule (60 mg total) by mouth daily, Disp: 90 capsule, Rfl: 1    ELDERBERRY PO, Take by mouth daily , Disp: , Rfl:     Epinastine HCl 0 05 % ophthalmic solution, Administer 1 drop to both eyes 2 (two) times a day, Disp: 5 mL, Rfl: 12    Evolocumab 140 MG/ML SOAJ, Inject 1 mL (140 mg total) under the skin every 14 (fourteen) days, Disp: 2 pen, Rfl: 3    fluconazole (DIFLUCAN) 150 mg tablet, Take 1 tablet (150 mg total) by mouth once for 1 dose, Disp: 1 tablet, Rfl: 0    fluticasone-vilanterol (BREO ELLIPTA) 200-25 MCG/INH inhaler, Inhale 1 puff daily Rinse mouth after use , Disp: 3 Inhaler, Rfl: 3    furosemide (LASIX) 40 mg tablet, Take 1 tablet (40 mg total) by mouth daily, Disp: 90 tablet, Rfl: 3    LORazepam (ATIVAN) 0 5 mg tablet, Take 1 tablet (0 5 mg total) by mouth every 8 (eight) hours as needed for anxiety, Disp: 90 tablet, Rfl: 0    metoprolol succinate (TOPROL-XL) 100 mg 24 hr tablet, TAKE ONE TABLET BY MOUTH TWICE DAILY, Disp: 180 tablet, Rfl: 0    montelukast (SINGULAIR) 10 mg tablet, TAKE ONE TABLET BY MOUTH AT BEDTIME , Disp: 90 tablet, Rfl: 0    MULTIPLE VITAMIN PO, Take by mouth, Disp: , Rfl:     nitrofurantoin (MACROBID) 100 mg capsule, Take 1 capsule (100 mg total) by mouth 2 (two) times a day for 7 days, Disp: 14 capsule, Rfl: 0    nystatin (MYCOSTATIN) powder, , Disp: , Rfl:     omeprazole (PriLOSEC) 20 mg delayed release capsule, Take 1 capsule (20 mg total) by mouth daily, Disp: 90 capsule, Rfl: 1    ondansetron (Zofran ODT) 4 mg disintegrating tablet, Take 1 tablet (4 mg total) by mouth every 6 (six) hours as needed for nausea or vomiting, Disp: 20 tablet, Rfl: 0    phenazopyridine (PYRIDIUM) 200 mg tablet, Take 1 tablet (200 mg total) by mouth 3 (three) times a day with meals, Disp: 10 tablet, Rfl: 0    pimecrolimus (ELIDEL) 1 % cream, Apply topically to rash on eyelids and hands once or twice a daily  , Disp: 100 g, Rfl: 3    spironolactone (ALDACTONE) 25 mg tablet, TAKE ONE TABLET BY MOUTH TWICE DAILY, Disp: 180 tablet, Rfl: 0    sucralfate (CARAFATE) 1 g tablet, Take 1 tablet (1 g total) by mouth 4 (four) times a day, Disp: 360 tablet, Rfl: 1    traMADol (Ultram) 50 mg tablet, Take 1 tablet (50 mg total) by mouth 2 (two) times a day as needed for moderate pain, Disp: 60 tablet, Rfl: 0    traMADol (ULTRAM) 50 mg tablet, Take 1 tablet (50 mg total) by mouth every 8 (eight) hours as needed for severe pain, Disp: 60 tablet, Rfl: 0    triamcinolone (KENALOG) 0 1 % cream, Apply topically 2 (two) times a day, Disp: 30 g, Rfl: 1    Zinc 100 MG TABS, Take by mouth daily, Disp: , Rfl:     cephalexin (KEFLEX) 250 mg capsule, , Disp: , Rfl:    Cholecalciferol 25 MCG (1000 UT) tablet, Take 1,000 Units by mouth 2 (two) times a day (Patient not taking: Reported on 1/18/2022 ), Disp: , Rfl:     fexofenadine (ALLEGRA) 180 MG tablet, Take 1 tablet (180 mg total) by mouth daily IN AM, Disp: 30 tablet, Rfl: 12    hydrocortisone (ANUSOL-HC) 2 5 % rectal cream, Insert rectally as needed  (Patient not taking: Reported on 2/8/2022 ), Disp: 28 g, Rfl: 5    ondansetron (ZOFRAN) 4 mg tablet, Take 1 tablet (4 mg total) by mouth every 8 (eight) hours as needed for nausea or vomiting (Patient not taking: Reported on 1/18/2022 ), Disp: 20 tablet, Rfl: 0    verapamil (CALAN-SR) 120 mg CR tablet, Take 1 tablet (120 mg total) by mouth daily at bedtime (Patient not taking: Reported on 2/18/2022 ), Disp: 30 tablet, Rfl: 5    Current Facility-Administered Medications:     cyanocobalamin injection 1,000 mcg, 1,000 mcg, Intramuscular, Q30 Days, [facility], , 1,000 mcg at 09/22/21 1322    Vitamin B-12 SUBL, , Sublingual, Once, [facility]     CONSTITUTIONAL:   Vitals:    02/18/22 1515   Temp: (!) 96 7 °F (35 9 °C)   TempSrc: Temporal   Weight: 93 9 kg (207 lb)   Height: 5' 4" (1 626 m)       Specific Alerts:    Have you been seen by a [facility] in the last 3 years? YES    Are you pregnant or planning to become pregnant? N/A    Are you currently or planning to be nursing or breast feeding? N/A    Allergies   Allergen Reactions    Ace Inhibitors Angioedema and Anaphylaxis     Anaphylaxis    Other Other (See Comments) and Anaphylaxis     Other reaction(s): angioadema  Other reaction(s): Other (See Comments)  Preservatives- itching throat closes, hi  Other reaction(s): angioadema  E Z Cat - hives throat closes itching,  Preservatives- itching throat closes, hi    Benicar [Olmesartan] Angioedema     See Allergy note from 9/11/2008        Hydralazine      Lip swelling    Sulfa Antibiotics Other (See Comments)     stuffiness,itching,hives,throat closing  Valsartan Angioedema       May we call your Preferred Phone number to discuss your specific medical information? YES    May we leave a detailed message that includes your specific medical information? YES    Have you traveled outside of the [facility] in the past 3 months? No    Do you currently have a pacemaker or defibrillator? No    Do you have any artificial heart valves, joints, plates, screws, rods, stents, pins, etc? No   - If Yes, were any placed within the last 2 years? Do you require any medications prior to a surgical procedure? No   - If Yes, for which procedure? n/a   - If Yes, what medications to you require? n/a    Are you taking any medications that cause you to bleed more easily ("blood thinners") YES    Have you ever experienced a rapid heartbeat with epinephrine? No    Have you ever been treated with "gold" (gold sodium thiomalate) therapy? No    [facility] can help with wrinkles, "laugh lines," facial volume loss, "double chin," "love handles," age spots, and more  Are you interested in learning today about some of the skin enhancement procedures that we offer? (If Yes, please provide more detail) No    Review of Systems:  Have you recently had or currently have any of the following? · Fever or chills: YES  · Night Sweats: YES  · Headaches: YES  · Weight Gain: No  · Weight Loss: YES  · Blurry Vision: YES,   · Nausea: YES  · Vomiting: No  · Diarrhea: YES  · Blood in Stool: No  · Abdominal Pain: YES  · Itchy Skin: YES  · Painful Joints: YES, sometimes  · Swollen Joints: No  · Muscle Pain: No  · Irregular Mole: No  · Sun Burn: No  · Dry Skin: No  · Skin Color Changes: No  · Scar or Keloid: No  · Cold Sores/Fever Blisters: No  · Bacterial Infections/MRSA: No  · Anxiety: YES  · Depression: YES  · Suicidal or Homicidal Thoughts: No      PSYCH: Normal mood and affect  EYES: Normal conjunctiva  RESPIRATORY: Normal respirations      1   INTERTRIGO    Physical Exam:   Anatomic Location Affected:  Groin and breast (bodyfolds)  Morphological Description:                   Pertinent Positives:   Pertinent Negatives: n/a    Additional History of Present Condition:  Located in the body folds  Present for few months  Reports itching and irritation  Ketoconazole Cream as needed for itching     Assessment and Plan:  Based on a thorough discussion of this condition and the management approach to it (including a comprehensive discussion of the known risks, side effects and potential benefits of treatment), the patient (family) agrees to implement the following specific plan:   Start Ketoconazole Cream twice a day    There appears to be some atrophy   BMI 35 53  Assessment and Plan  Intertrigo describes a rash in the flexures or body folds, such as behind the ears, in the folds of the neck, under the arms (axillae), under a protruding abdomen, in the groin, between the buttocks, in the finger webs or toe spaces  Although intertrigo may affect one skin fold, it is common for it to involve multiple sites  Intertrigo can affect males and females of any age  It is particularly common in people that are overweight or obese (see metabolic syndrome)  Other contributing factors are:   Genetic tendency to skin disease   Hyperhidrosis (excessive sweating)    Intertrigo can be acute (recent onset), relapsing (recurrent), or chronic (present for more than 6 weeks)  The exact appearance and behaviour depends on the underlying cause or causes  The skin affected by intertrigo is inflamed, ie reddened and uncomfortable  It may become moist and macerated, leading to fissuring (cracks) and peeling  Intertrigo is due to genetic and environmental factors   Flexural skin has relatively high surface temperature   Moisture from insensible water loss and sweating cannot evaporate due to occlusion   Friction from movement of adjacent skin results in chafing      The microorganisms that are normally resident on flexural skin, the microbiome, include corynebacteria, other bacteria and yeasts  These multiply in warm moist environments and may cause disease  We can classify intertrigo into infectious and inflammatory origin but there is often overlap   Infections tend to be unilateral and asymmetrical    Inflammatory disorders tend to be symmetrical affecting armpits, groins, under the breasts and the abdominal folds, except atopic dermatitis, which more often arises on the neck, and in elbow and knee creases  Investigations may be necessary to determine the cause of intertrigo   A swab for microscopy and culture of bacteria (microbiology)   A scraping for microscopy and culture of fungi (mycology)   A skin biopsy may be performed for histopathology if the skin condition is unusual or fails to respond to treatment  What is the treatment for intertrigo? Treatment depends on the underlying cause, if identified, and on which micro-organisms are present in the rash  Combinations are common   Sweating may be reduced with a gentle antiperspirant   Physical exertion should be followed by a bath and completely drying the skin folds using a hair dryer on cool setting, soft towel and/or corn starch powder   Triple paste contains petrolatum, zinc oxide, and aluminum acetate solution to reduce friction, irritation and sweating   Bacteria may be treated with topical antibiotics such as fusidic acid cream, mupirocin ointment, or oral antibiotics such as flucloxacillin and erythromycin   Yeasts and fungi may be treated with topical antifungals such as clotrimazole and terbinafine cream or oral antifungal agents such as itraconazole or terbinafine   Inflammatory skin diseases are often treated with low potency topical steroid creams such as hydrocortisone   More potent steroids are usually avoided in the flexures because they may cause skin thinning resulting in stretch marks (striae) and even ulcers  Calcineurin inhibitors such as tacrolimus ointment or pimecrolimus cream may also prove effective      Scribe Attestation    I,:  Nichole Roth MA am acting as a scribe while in the presence of the attending physician :       I,:  Shira Becerra MD personally performed the services described in this documentation    as scribed in my presence :

## 2022-02-24 NOTE — TELEPHONE ENCOUNTER
Our mutual patient is scheduled for procedure:  EGD     On: _3__/_  29  _/_  2022        With:   __Myesha____     She is taking the following blood thinner:             Eliquis                                         Can this be stopped ___2___ days prior to the procedure?   YES     Physician Approving clearance:

## 2022-02-25 ENCOUNTER — TELEPHONE (OUTPATIENT)
Dept: FAMILY MEDICINE CLINIC | Facility: CLINIC | Age: 62
End: 2022-02-25

## 2022-02-25 NOTE — TELEPHONE ENCOUNTER
Pt had an allergic reaction to Ampicillin - hives  She was given this by urology @ [facility]  No throat closure  I told her to stop the meds and I would ask you what to do  I could not get a hold of urology    Please advise,   I will look for the culture          There is a culture done at [facility] and you can view that under that tab

## 2022-03-03 ENCOUNTER — TELEPHONE (OUTPATIENT)
Dept: OBGYN CLINIC | Facility: MEDICAL CENTER | Age: 62
End: 2022-03-03

## 2022-03-07 DIAGNOSIS — I10 ESSENTIAL HYPERTENSION: ICD-10-CM

## 2022-03-07 RX ORDER — METOPROLOL SUCCINATE 100 MG/1
100 TABLET, EXTENDED RELEASE ORAL 2 TIMES DAILY
Qty: 180 TABLET | Refills: 2 | Status: SHIPPED | OUTPATIENT
Start: 2022-03-07

## 2022-03-10 ENCOUNTER — TELEPHONE (OUTPATIENT)
Dept: FAMILY MEDICINE CLINIC | Facility: CLINIC | Age: 62
End: 2022-03-10

## 2022-03-14 ENCOUNTER — TELEPHONE (OUTPATIENT)
Dept: BARIATRICS | Facility: CLINIC | Age: 62
End: 2022-03-14

## 2022-03-17 ENCOUNTER — OFFICE VISIT (OUTPATIENT)
Dept: FAMILY MEDICINE CLINIC | Facility: CLINIC | Age: 62
End: 2022-03-17
Payer: COMMERCIAL

## 2022-03-17 VITALS
WEIGHT: 207 LBS | HEIGHT: 64 IN | HEART RATE: 66 BPM | RESPIRATION RATE: 18 BRPM | OXYGEN SATURATION: 99 % | BODY MASS INDEX: 35.34 KG/M2 | DIASTOLIC BLOOD PRESSURE: 88 MMHG | SYSTOLIC BLOOD PRESSURE: 144 MMHG | TEMPERATURE: 97.3 F

## 2022-03-17 DIAGNOSIS — N30.10 INTERSTITIAL CYSTITIS: Primary | ICD-10-CM

## 2022-03-17 DIAGNOSIS — I73.9 PERIPHERAL VASCULAR DISEASE (HCC): ICD-10-CM

## 2022-03-17 DIAGNOSIS — I10 ESSENTIAL HYPERTENSION: ICD-10-CM

## 2022-03-17 DIAGNOSIS — R33.9 URINARY RETENTION: ICD-10-CM

## 2022-03-17 PROCEDURE — 99213 OFFICE O/P EST LOW 20 MIN: CPT | Performed by: FAMILY MEDICINE

## 2022-03-17 RX ADMIN — CYANOCOBALAMIN 1000 MCG: 1000 INJECTION INTRAMUSCULAR; SUBCUTANEOUS at 14:35

## 2022-03-17 NOTE — PROGRESS NOTES
Assessment/Plan:       Diagnoses and all orders for this visit:    Interstitial cystitis  -     Ambulatory Referral to Urology; Future    Urinary retention  -     Ambulatory Referral to Urology; Future    Peripheral vascular disease (HCC)  -     VAS lower limb arterial duplex, limited/unilateral; Future            Subjective:        Patient ID: Diego Cespedes is a 57 y o  female  Patient is here to follow-up on interstitial cystitis as well as urinary retention  Patient did have urinary retention and was seen the office the with Urology and catheter placed  Patient with 555 cc drained  Patient with ongoing pelvic pain  Patient also with left foot feeling colder than right with some paresthesias  The following portions of the patient's history were reviewed and updated as appropriate: allergies, current medications, past family history, past medical history, past social history, past surgical history and problem list       Review of Systems   Constitutional: Negative  HENT: Negative  Eyes: Negative  Respiratory: Negative  Cardiovascular: Negative  Gastrointestinal: Negative  Endocrine: Negative  Genitourinary: Positive for difficulty urinating  Musculoskeletal: Negative  Skin: Negative  Allergic/Immunologic: Negative  Neurological: Positive for numbness  Hematological: Negative  Psychiatric/Behavioral: Negative  Objective:               /88 (BP Location: Right arm, Patient Position: Sitting, Cuff Size: Adult)   Pulse 66   Temp (!) 97 3 °F (36 3 °C) (Tympanic)   Resp 18   Ht 5' 4" (1 626 m)   Wt 93 9 kg (207 lb)   LMP  (LMP Unknown)   SpO2 99%   BMI 35 53 kg/m²          Physical Exam  Vitals and nursing note reviewed  Constitutional:       Appearance: Normal appearance  Cardiovascular:      Rate and Rhythm: Normal rate and regular rhythm  Heart sounds: Normal heart sounds     Pulmonary:      Effort: Pulmonary effort is normal  Breath sounds: Normal breath sounds  Neurological:      Mental Status: She is alert  Psychiatric:         Mood and Affect: Mood normal          Behavior: Behavior normal          Thought Content:  Thought content normal

## 2022-03-24 ENCOUNTER — TELEPHONE (OUTPATIENT)
Dept: CARDIOLOGY CLINIC | Facility: CLINIC | Age: 62
End: 2022-03-24

## 2022-03-24 ENCOUNTER — OFFICE VISIT (OUTPATIENT)
Dept: CARDIOLOGY CLINIC | Facility: CLINIC | Age: 62
End: 2022-03-24
Payer: COMMERCIAL

## 2022-03-24 VITALS
WEIGHT: 212.6 LBS | SYSTOLIC BLOOD PRESSURE: 152 MMHG | BODY MASS INDEX: 36.29 KG/M2 | HEART RATE: 68 BPM | HEIGHT: 64 IN | OXYGEN SATURATION: 94 % | DIASTOLIC BLOOD PRESSURE: 70 MMHG

## 2022-03-24 DIAGNOSIS — E78.5 DYSLIPIDEMIA: ICD-10-CM

## 2022-03-24 DIAGNOSIS — I48.0 PAF (PAROXYSMAL ATRIAL FIBRILLATION) (HCC): Primary | ICD-10-CM

## 2022-03-24 DIAGNOSIS — I10 BENIGN ESSENTIAL HTN: ICD-10-CM

## 2022-03-24 PROCEDURE — 99214 OFFICE O/P EST MOD 30 MIN: CPT | Performed by: INTERNAL MEDICINE

## 2022-03-24 RX ORDER — IPRATROPIUM BROMIDE 42 UG/1
SPRAY, METERED NASAL
COMMUNITY
Start: 2022-02-28

## 2022-03-24 RX ORDER — PROPRANOLOL/HYDROCHLOROTHIAZID 40 MG-25MG
TABLET ORAL
COMMUNITY

## 2022-03-24 RX ORDER — SOLIFENACIN SUCCINATE 5 MG/1
TABLET, FILM COATED ORAL
COMMUNITY
Start: 2022-03-02 | End: 2022-04-07 | Stop reason: ALTCHOICE

## 2022-03-24 RX ORDER — CLOTRIMAZOLE AND BETAMETHASONE DIPROPIONATE 10; .64 MG/G; MG/G
CREAM TOPICAL
COMMUNITY
Start: 2022-03-02

## 2022-03-24 RX ORDER — METHENAMINE, SODIUM PHOSPHATE, MONOBASIC, MONOHYDRATE, PHENYL SALICYLATE, METHYLENE BLUE, AND HYOSCYAMINE SULFATE 120; 40.8; 36; 10; .12 MG/1; MG/1; MG/1; MG/1; MG/1
1 CAPSULE ORAL 3 TIMES DAILY
COMMUNITY
Start: 2022-02-25

## 2022-03-24 RX ORDER — CEPHALEXIN 500 MG/1
CAPSULE ORAL
COMMUNITY
Start: 2022-03-10 | End: 2022-04-07 | Stop reason: ALTCHOICE

## 2022-03-24 NOTE — PROGRESS NOTES
Cardiology             Preethi Nam  1960  [id number]              Assessment/Plan:     1  Paroxysmal atrial fibrillation, diagnosed on Holter monitor 03/2020  2  Hypertension  3  Multiple drug intolerances and possible allergies  4  Probable heterozygous familial hypercholesterolemia, on Repatha  5  Obesity   6  Sleep apnea, compliant with CPAP therapy   7  Lower extremity edema, on furosemide, resolved           · Regular rhythm on examination  Atrial fibrillation was reported on prior ED visit at [facility] 02/2022  A prior to that, Everet Cordia monitoring revealed short runs of SVT, PACs, PVCs  Patient taking diltiazem 120 mg as needed  Continue Eliquis anticoagulation  · Blood pressure difficult to control as she has reported intolerances to multiple medications  HCTZ has caused "throat closing," atorvastatin has caused joint pains, valsartan has caused reported angioedema, hydralazine has caused "kidney pain," nifedipine caused headache and nausea, verapamil cause increase in palpitations  · Home blood pressure log reviewed which runs in the 140s to 160s for the most part  I have offered increase in the spironolactone to 50 mg daily  We can also consider clonidine is a next step although I suspect she will likely report intolerance to that as well  She would like to hold off on changing her medications at this time, as she is having epigastric abdominal symptoms at this time which she feels may be contributing to her blood pressure  I have strongly advocated heart healthy diet, exercise and weight loss to help with her blood pressure  Low-sodium diet was strongly advised    Continue CPAP therapy               Follow-up with me in 3 months              Interval History:      This is a 69-year-old female diagnosed with paroxysmal atrial fibrillation on Holter monitoring 3/020   That time echocardiogram had revealed mild-to-moderate aortic regurgitation with normal left ventricular systolic function   Nuclear stress test June 2020 was unremarkable with normal perfusion      She has hypertension, and has multiple medications listed as allergies including beta-blockers, although she has been taking metoprolol for quite some time   Although on her allergy list HCTZ states throat closing," she states that actually cause some ankle swelling along with amlodipine   Atorvastatin caused joint pain, although it is listed in her allergies also as throat closing    She seems to have had a true allergic reaction to hydralazine and valsartan   She states valsartan caused angioedema, and she does not remember what happened with hydralazine, although states she thinks it may have been ankle swelling      She was last seen by Dr Damari Courtney 01/28/2021 at which time she was maintained on metoprolol succinate and spironolactone for hypertension   At 1 point she was asked to take diltiazem as needed for palpitations  Mulugeta Mendez has been maintained on Eliquis anticoagulation        She underwent a Zio monitor 03/2021 at [facility](should be scanned into media section, personally reviewed rhythm strips) revealing normal sinus rhythm with 8 runs of SVT, longest lasting 17 beats   There were 18 patient triggers, most of which correlated with PACs, short atrial runs, and PVCs  Vini Colorado was no evidence of atrial fibrillation      Nuclear stress test 06/2020 was unremarkable     In August 2021 she was hospitalized at [facility] for epigastric and left upper quadrant pain, admitted for superior mesenteric artery thrombosis   She received heparin drip, and underwent SMA stenting with IR on 08/19/2021   She was initiated on Plavix, and continued on Eliquis      During her prior visit 10/15/2021 she was initiated on diltiazem which she later stopped due to headache and nausea  Subsequent nifedipine was tried which caused headache and nausea as well which she stopped on her own    Verapamil caused increase in palpitations      She went to [facility] due to urinary retention and was reported to be in atrial fibrillation  She presents today for follow-up with complaints of epigastric and abdominal discomfort  She denies exertional chest pain, shortness of breath, dizziness, palpitations, lower extremity edema          Vitals:  Vitals:    03/24/22 0810   BP: 152/70   Pulse: 68   SpO2: 94%   Weight: 96 4 kg (212 lb 9 6 oz)   Height: 5' 4" (1 626 m)         Past Medical History:   Diagnosis Date    A-fib ([facility] [address] ) 03/2020    Allergic     Anxiety     Arthritis     Asthma     Colon polyp     Depression     GERD (gastroesophageal reflux disease)     Heart murmur     Hives     Hyperlipidemia     Hypertension     Infertility, female     Migraine     Palpitations     Psychiatric disorder     Wears glasses      Social History     Socioeconomic History    Marital status:      Spouse name: Not on file    Number of children: 0    Years of education: Not on file    Highest education level: Not on file   Occupational History    Occupation: unemployed   Tobacco Use    Smoking status: Former Smoker     Packs/day: 0 50     Years: 10 00     Pack years: 5 00     Quit date: 2019     Years since quitting: 3 2    Smokeless tobacco: Never Used   Vaping Use    Vaping Use: Never used   Substance and Sexual Activity    Alcohol use: No    Drug use: No    Sexual activity: Not Currently   Other Topics Concern    Not on file   Social History Narrative    Who lives in your home: Alone     What type of home do you live in: Apartment     Age of your home: 1952 built     How long have you been living there: 5 yrs     Type of heat: forced hot air     Type of fuel: electric     What type of jamin is in your bedroom: carpet jamin     Do you have the following in or near your home:    Air products: Humidifier in the bedroom/kitchen     Pests: none     Pets: none     Are pets allowed in bedroom: N/A     Open fields, wooded areas nearby: No Basement: [id number]-hdfxk-no mold     Exposure to second hand smoke: No    Central air     Habits:    Caffeine: Hot tea 1 cup daily- ice tea 1 cup in the afternoon    Chocolate: Occasionally      Social Determinants of Health     Financial Resource Strain: Low Risk     Difficulty of Paying Living Expenses: Not hard at all   Food Insecurity: No Food Insecurity    Worried About Running Out of Food in the Last Year: Never true    Donell of Food in the Last Year: Never true   Transportation Needs: No Transportation Needs    Lack of Transportation (Medical): No    Lack of Transportation (Non-Medical): No   Physical Activity: Inactive    Days of Exercise per Week: 0 days    Minutes of Exercise per Session: 0 min   Stress: Not on file   Social Connections:  Moderately Isolated    Frequency of Communication with Friends and Family: More than three times a week    Frequency of Social Gatherings with Friends and Family: More than three times a week    Attends Temple Services: 1 to 4 times per year    Active Member of [employer] or Organizations: No    Attends Club or Organization Meetings: Never    Marital Status: Never    Intimate Partner Violence: Not At Risk    Fear of Current or Ex-Partner: No    Emotionally Abused: No    Physically Abused: No    Sexually Abused: No   Housing Stability: Not on file      Family History   Problem Relation Age of Onset    Lung cancer Mother     Brain cancer Mother     Diabetes Father     Depression Father     Other Father         septic     Allergies Sister     Hashimoto's thyroiditis Sister     Abdominal aortic aneurysm Sister     Diabetes Sister     No Known Problems Sister     Hodgkin's lymphoma Brother     No Known Problems Brother     Diabetes Other      Past Surgical History:   Procedure Laterality Date    COLONOSCOPY      EGD      EXPLORATORY LAPAROTOMY      for infertility    HAND SURGERY      Hand excision of tendon cyst    VENTRICULOPERITONEAL SHUNT      WISDOM TOOTH EXTRACTION         Current Outpatient Medications:     acetaminophen (TYLENOL) 325 mg tablet, Take 650 mg by mouth as needed for mild pain , Disp: , Rfl:     apixaban (Eliquis) 5 mg, Take 1 tablet (5 mg total) by mouth 2 (two) times a day, Disp: 180 tablet, Rfl: 5    ascorbic acid (VITAMIN C) 1000 MG tablet, Take 1,000 mg by mouth daily, Disp: , Rfl:     butalbital-acetaminophen-caffeine (FIORICET,ESGIC) -40 mg per tablet, TAKE ONE TABLET BY MOUTH EVERY SIX HOURS AS NEEDED FOR HEADACHES, Disp: 30 tablet, Rfl: 0    Carafate 1 GM/10ML suspension, Take 10 mL (1 g total) by mouth 4 (four) times a day, Disp: 3600 mL, Rfl: 1    cephalexin (KEFLEX) 500 mg capsule, , Disp: , Rfl:     cetirizine (ZyrTEC) 10 mg tablet, Take 1 tablet (10 mg total) by mouth daily for 365 doses, Disp: 30 tablet, Rfl: 11    chlordiazepoxide-clidinium (LIBRAX) 5-2 5 mg per capsule, Take 1 capsule by mouth 2 (two) times a day before meals, Disp: 60 capsule, Rfl: 5    chlorhexidine (PERIDEX) 0 12 % solution, , Disp: , Rfl:     Cholecalciferol 25 MCG (1000 UT) tablet, Take 1,000 Units by mouth daily, Disp: , Rfl:     clotrimazole-betamethasone (LOTRISONE) 1-0 05 % cream, , Disp: , Rfl:     Epinastine HCl 0 05 % ophthalmic solution, Administer 1 drop to both eyes 2 (two) times a day, Disp: 5 mL, Rfl: 12    Evolocumab 140 MG/ML SOAJ, Inject 1 mL (140 mg total) under the skin every 14 (fourteen) days, Disp: 2 pen, Rfl: 3    fexofenadine (ALLEGRA) 180 MG tablet, Take 1 tablet (180 mg total) by mouth daily IN AM, Disp: 30 tablet, Rfl: 12    fluticasone-vilanterol (BREO ELLIPTA) 200-25 MCG/INH inhaler, Inhale 1 puff daily Rinse mouth after use , Disp: 3 Inhaler, Rfl: 3    furosemide (LASIX) 40 mg tablet, Take 1 tablet (40 mg total) by mouth daily, Disp: 90 tablet, Rfl: 3    ipratropium (ATROVENT) 0 06 % nasal spray, , Disp: , Rfl:     ketoconazole (NIZORAL) 2 % cream, Apply topically daily, Disp: 60 g, Rfl: 2    LORazepam (ATIVAN) 0 5 mg tablet, Take 1 tablet (0 5 mg total) by mouth every 8 (eight) hours as needed for anxiety, Disp: 90 tablet, Rfl: 0    Meth-Hyo-M Bl-Na Phos-Ph Sal (Uribel) 118 MG CAPS, Take 1 each by mouth Three times a day, Disp: , Rfl:     metoprolol succinate (TOPROL-XL) 100 mg 24 hr tablet, Take 1 tablet (100 mg total) by mouth 2 (two) times a day, Disp: 180 tablet, Rfl: 2    montelukast (SINGULAIR) 10 mg tablet, TAKE ONE TABLET BY MOUTH AT BEDTIME , Disp: 90 tablet, Rfl: 0    MULTIPLE VITAMIN PO, Take by mouth, Disp: , Rfl:     nystatin (MYCOSTATIN) powder, , Disp: , Rfl:     omeprazole (PriLOSEC) 20 mg delayed release capsule, Take 1 capsule (20 mg total) by mouth daily, Disp: 90 capsule, Rfl: 1    ondansetron (Zofran ODT) 4 mg disintegrating tablet, Take 1 tablet (4 mg total) by mouth every 6 (six) hours as needed for nausea or vomiting, Disp: 20 tablet, Rfl: 0    phenazopyridine (PYRIDIUM) 200 mg tablet, Take 1 tablet (200 mg total) by mouth 3 (three) times a day with meals, Disp: 10 tablet, Rfl: 0    pimecrolimus (ELIDEL) 1 % cream, Apply topically to rash on eyelids and hands once or twice a daily  , Disp: 100 g, Rfl: 3    solifenacin (VESICARE) 5 mg tablet, , Disp: , Rfl:     spironolactone (ALDACTONE) 25 mg tablet, TAKE ONE TABLET BY MOUTH TWICE DAILY, Disp: 180 tablet, Rfl: 0    sucralfate (CARAFATE) 1 g tablet, Take 1 tablet (1 g total) by mouth 4 (four) times a day, Disp: 360 tablet, Rfl: 1    traMADol (ULTRAM) 50 mg tablet, Take 1 tablet (50 mg total) by mouth every 8 (eight) hours as needed for severe pain, Disp: 60 tablet, Rfl: 0    triamcinolone (KENALOG) 0 1 % cream, Apply topically 2 (two) times a day, Disp: 30 g, Rfl: 1    Turmeric 500 MG CAPS, , Disp: , Rfl:     Zinc 100 MG TABS, Take by mouth daily, Disp: , Rfl:     cephalexin (KEFLEX) 250 mg capsule, , Disp: , Rfl:     Cholecalciferol 25 MCG (1000 UT) tablet, Take 1,000 Units by mouth 2 (two) times a day (Patient not taking: Reported on 1/18/2022 ), Disp: , Rfl:     Dexilant 60 MG capsule, Take 1 capsule (60 mg total) by mouth daily (Patient not taking: Reported on 3/24/2022 ), Disp: 90 capsule, Rfl: 0    ELDERBERRY PO, Take by mouth daily  (Patient not taking: Reported on 3/24/2022 ), Disp: , Rfl:     hydrocortisone (ANUSOL-HC) 2 5 % rectal cream, Insert rectally as needed  (Patient not taking: Reported on 2/8/2022 ), Disp: 28 g, Rfl: 5    traMADol (Ultram) 50 mg tablet, Take 1 tablet (50 mg total) by mouth 2 (two) times a day as needed for moderate pain (Patient not taking: Reported on 3/24/2022 ), Disp: 60 tablet, Rfl: 0    Current Facility-Administered Medications:     cyanocobalamin injection 1,000 mcg, 1,000 mcg, Intramuscular, Q30 Days, Jaylon Nolen DO, 1,000 mcg at 03/17/22 1435    Vitamin B-12 SUBL, , Sublingual, Once, Jaylon Nolen DO        Review of Systems:  Review of Systems   Constitutional: Negative for activity change, fever and unexpected weight change  HENT: Negative for facial swelling, nosebleeds and voice change  Respiratory: Negative for chest tightness, shortness of breath and wheezing  Cardiovascular: Negative for chest pain, palpitations and leg swelling  Gastrointestinal: Positive for abdominal pain  Negative for abdominal distention  Genitourinary: Negative for hematuria  Musculoskeletal: Negative for arthralgias  Skin: Negative for color change, pallor, rash and wound  Neurological: Negative for dizziness, seizures and syncope  Psychiatric/Behavioral: Negative for agitation  Physical Exam:  Physical Exam  Vitals reviewed  Constitutional:       Appearance: She is well-developed  HENT:      Head: Normocephalic and atraumatic  Cardiovascular:      Rate and Rhythm: Normal rate and regular rhythm  Heart sounds: Normal heart sounds  Pulmonary:      Effort: Pulmonary effort is normal       Breath sounds: Normal breath sounds  Abdominal:      Palpations: Abdomen is soft  Musculoskeletal:         General: Normal range of motion  Cervical back: Normal range of motion and neck supple  Skin:     General: Skin is warm and dry  Neurological:      Mental Status: She is alert and oriented to person, place, and time  Psychiatric:         Behavior: Behavior normal          Thought Content: Thought content normal          Judgment: Judgment normal          This note was completed in part utilizing [employer] Software  Grammatical errors, random word insertions, spelling mistakes, and incomplete sentences can be an occasional consequence of this system secondary to software limitations, ambient noise, and hardware issues  If you have any questions or concerns about the content, text, or information contained within the body of this dictation, please contact the provider for clarification

## 2022-03-24 NOTE — TELEPHONE ENCOUNTER
Pt is calling asking for name of book you recommended at [facility] today  She was looking online but did not find it  Please advise

## 2022-03-28 ENCOUNTER — TELEPHONE (OUTPATIENT)
Dept: GASTROENTEROLOGY | Facility: CLINIC | Age: 62
End: 2022-03-28

## 2022-03-28 DIAGNOSIS — R22.9 SUBCUTANEOUS NODULE: ICD-10-CM

## 2022-03-28 DIAGNOSIS — M79.602 LEFT ARM PAIN: ICD-10-CM

## 2022-03-28 DIAGNOSIS — N30.10 INTERSTITIAL CYSTITIS: ICD-10-CM

## 2022-03-28 RX ORDER — PHENAZOPYRIDINE HYDROCHLORIDE 200 MG/1
200 TABLET, FILM COATED ORAL
Qty: 10 TABLET | Refills: 0 | Status: SHIPPED | OUTPATIENT
Start: 2022-03-28 | End: 2022-04-04 | Stop reason: SDUPTHER

## 2022-03-28 RX ORDER — TRAMADOL HYDROCHLORIDE 50 MG/1
50 TABLET ORAL EVERY 8 HOURS PRN
Qty: 60 TABLET | Refills: 0 | Status: SHIPPED | OUTPATIENT
Start: 2022-03-28 | End: 2022-04-19 | Stop reason: SDUPTHER

## 2022-03-28 RX ORDER — ONDANSETRON 4 MG/1
4 TABLET, ORALLY DISINTEGRATING ORAL EVERY 6 HOURS PRN
Qty: 20 TABLET | Refills: 0 | Status: SHIPPED | OUTPATIENT
Start: 2022-03-28 | End: 2022-04-25 | Stop reason: SDUPTHER

## 2022-03-28 NOTE — TELEPHONE ENCOUNTER
Our mutual patient is scheduled for procedure:  EGD (has been rescheduled from 3/29/22)    On: 6/30/2022      With: Dr Dayana Fishman is taking the following blood thinner:  Eliquis    Can this be stopped 2 days prior to the procedure?       Physician Approving clearance: ________________________

## 2022-03-28 NOTE — TELEPHONE ENCOUNTER
Our mutual patient is scheduled for procedure:  EGD (has been rescheduled from 3/29/22)     On: 6/30/2022       With: Dr Janis Bello is taking the following blood thinner:  Eliquis     Can this be stopped 2 days prior to the procedure?   YES     Physician Approving clearance:

## 2022-03-28 NOTE — TELEPHONE ENCOUNTER
Patients GI provider:  Dr Mckeon Large    Number to return call: [phone]    Reason for call: Pt calling to reschedule her procedure  Pt not feeling well  Woke up vomiting  Above is her number       Scheduled procedure/appointment date if applicable: procedure 3/72/27

## 2022-03-31 ENCOUNTER — OFFICE VISIT (OUTPATIENT)
Dept: GASTROENTEROLOGY | Facility: CLINIC | Age: 62
End: 2022-03-31
Payer: COMMERCIAL

## 2022-03-31 ENCOUNTER — TELEPHONE (OUTPATIENT)
Dept: GASTROENTEROLOGY | Facility: CLINIC | Age: 62
End: 2022-03-31

## 2022-03-31 ENCOUNTER — HOSPITAL ENCOUNTER (OUTPATIENT)
Dept: NON INVASIVE DIAGNOSTICS | Facility: CLINIC | Age: 62
Discharge: HOME | End: 2022-03-31
Payer: COMMERCIAL

## 2022-03-31 VITALS
HEART RATE: 67 BPM | RESPIRATION RATE: 18 BRPM | DIASTOLIC BLOOD PRESSURE: 70 MMHG | BODY MASS INDEX: 36.37 KG/M2 | SYSTOLIC BLOOD PRESSURE: 128 MMHG | TEMPERATURE: 97.6 F | HEIGHT: 64 IN | WEIGHT: 213 LBS | OXYGEN SATURATION: 97 %

## 2022-03-31 DIAGNOSIS — N30.10 IC (INTERSTITIAL CYSTITIS): ICD-10-CM

## 2022-03-31 DIAGNOSIS — I73.9 PERIPHERAL VASCULAR DISEASE (HCC): ICD-10-CM

## 2022-03-31 DIAGNOSIS — R10.31 RLQ ABDOMINAL PAIN: Primary | ICD-10-CM

## 2022-03-31 DIAGNOSIS — K59.09 OTHER CONSTIPATION: ICD-10-CM

## 2022-03-31 DIAGNOSIS — K64.8 OTHER HEMORRHOIDS: ICD-10-CM

## 2022-03-31 PROCEDURE — 93926 LOWER EXTREMITY STUDY: CPT | Performed by: SURGERY

## 2022-03-31 PROCEDURE — 93922 UPR/L XTREMITY ART 2 LEVELS: CPT | Performed by: SURGERY

## 2022-03-31 PROCEDURE — 93926 LOWER EXTREMITY STUDY: CPT

## 2022-03-31 PROCEDURE — 99215 OFFICE O/P EST HI 40 MIN: CPT | Performed by: PHYSICIAN ASSISTANT

## 2022-03-31 NOTE — PROGRESS NOTES
[facility] - Outpatient Follow-up Note  Marlene Monae 57 y o  female MRN: [id number]  Encounter: [id number]      Assessment and Plan    1  RLQ pain  The patient admits to bothersome right lower quadrant/groin pain  She states that this has been going on for a while but has recently worsened and is becoming more frequent  Eating and having a bowel movement does not seem to affect the pain  The patient does have underlying constipation, please see below  - obtain CT scan     2  History of PUD  3  GERD  The patient has acid reflux which is currently poorly controlled on omeprazole 20 mg once daily  She was previously on Dexilant 60 mg once daily with good control of her symptoms however she has had insurance issues with getting this medication  Her last EGD 5/28/19 revealed multiple superficial ulcers in the antrum with negative H pylori testing  Etiology thought to be NSAIDs  Repeat EGD was recommended however not done at the time  EGD pending 6/30/22   - dexliant samples provided  - avoid NSAIDS   - will see if we can schedule a sooner EGD     4  Fatty liver  Seen on CT scan 11/29/21  Recent liver enzymes normal aside for an AST of 55 and alk phos of 133   - recommend healthy diet and routine exercise     5  IBS-C  6  Opioid dependence   7  Hemorrhoids   The patient has a history of IBS-C and is currently on tramadol  She states that at this time she is having significant constipation despite stool softeners and pre and probiotics  She states that is not uncommon for her to have to massage her right lower quadrant to help her have a bowel movement  She also admits to bothersome hemorrhoids    She was previously on Proctofoam which was beneficial for her   - ensure adequate hydration was 60 oz of water daily  - ensure adequate fiber intake with 30 g of fiber daily  - start MiraLax 17 g once daily and increase as needed   - continue stool softeners  - refill proctofoam  - given she also has interstitial cystitis and needs to massage her pelvis to have a bowel movement she likely has some form of pelvic floor dysfunction, will referred to pelvic floor physical therapy     6  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HCC)  S/p stent at [facility]  Recent duplex study normal with the stent in tact and patent  Inferior mesenteric artery unable to be visualized secondary to overlying bowel gas  - no further workup at this time     7  Colon cancer screening  Last colonoscopy 5/28/19 revealed multiple large, moderate scattered diverticula in the sigmoid colon  - high fiber diet with a goal of 30g daily  - repeat colonoscopy 2029 or sooner if clinically indicated     Follow-up after EGD     ______________________________________________________________________    History of Present Illness  Shamika Viveros is a 57 y o  female here for evaluation of abdominal pain  The patient states that she is having right lower abdominal/groin pain  The patient has had this for a few months but it seems to be more frequent in nature  This seems to be unaffected by eating and she is uncertain if her bowel movements affected  She is currently constipated and states she has to massage her abdomen/groin to help her have a bowel movement  She does have a known history of irritable bowel syndrome constipation predominant and is currently taking and dependent on tramadol  She states that secondary to her constipation she does have bothersome hemorrhoids as well  Of note, the patient does have a history of stomach ulcers seen on EGD in 2019  She has not had a repeat EGD since this time  She is currently on omeprazole 20 mg once daily for acid reflux however this is poorly controlled  She was previously on Dexilant 60 mg with good control however insurance company will not cover this  Review of Systems   Constitutional: Negative for activity change, appetite change, chills, fatigue, fever and unexpected weight change     HENT: Negative for sore throat and trouble swallowing  Respiratory: Negative for cough and choking  Gastrointestinal: Positive for abdominal pain and constipation  Negative for abdominal distention, anal bleeding, blood in stool, diarrhea, nausea, rectal pain and vomiting  Musculoskeletal: Negative for back pain and gait problem  Psychiatric/Behavioral: Negative for confusion         Past Medical History  Past Medical History:   Diagnosis Date    A-fib ([facility] [place] [address] ) 03/2020    Allergic     Anxiety     Arthritis     Asthma     Colon polyp     Depression     GERD (gastroesophageal reflux disease)     Heart murmur     Hives     Hyperlipidemia     Hypertension     Infertility, female     Migraine     Palpitations     Psychiatric disorder     Wears glasses        Past Social history  Past Surgical History:   Procedure Laterality Date    COLONOSCOPY      EGD      EXPLORATORY LAPAROTOMY      for infertility    HAND SURGERY      Hand excision of tendon cyst    VENTRICULOPERITONEAL SHUNT      WISDOM TOOTH EXTRACTION       Social History     Socioeconomic History    Marital status:      Spouse name: Not on file    Number of children: 0    Years of education: Not on file    Highest education level: Not on file   Occupational History    Occupation: unemployed   Tobacco Use    Smoking status: Former Smoker     Packs/day: 0 50     Years: 10 00     Pack years: 5 00     Quit date: 2019     Years since quitting: 3 2    Smokeless tobacco: Never Used   Vaping Use    Vaping Use: Never used   Substance and Sexual Activity    Alcohol use: No    Drug use: No    Sexual activity: Not Currently   Other Topics Concern    Not on file   Social History Narrative    Who lives in your home: Alone     What type of home do you live in: Apartment     Age of your home: 1952 built     How long have you been living there: 5 yrs     Type of heat: forced hot air     Type of fuel: electric     What type of jamin is in your bedroom: carpet jamin     Do you have the following in or near your home:    Air products: Humidifier in the bedroom/kitchen     Pests: none     Pets: none     Are pets allowed in bedroom: N/A     Open fields, wooded areas nearby: No     Basement: [id number]-lteiduvjqlhz-drpsg-no mold     Exposure to second hand smoke: No    Central air     Habits:    Caffeine: Hot tea 1 cup daily- ice tea 1 cup in the afternoon    Chocolate: Occasionally      Social Determinants of Health     Financial Resource Strain: Low Risk     Difficulty of Paying Living Expenses: Not hard at all   Food Insecurity: No Food Insecurity    Worried About Running Out of Food in the Last Year: Never true    Donell of Food in the Last Year: Never true   Transportation Needs: No Transportation Needs    Lack of Transportation (Medical): No    Lack of Transportation (Non-Medical): No   Physical Activity: Inactive    Days of Exercise per Week: 0 days    Minutes of Exercise per Session: 0 min   Stress: Not on file   Social Connections:  Moderately Isolated    Frequency of Communication with Friends and Family: More than three times a week    Frequency of Social Gatherings with Friends and Family: More than three times a week    Attends Mu-ism Services: 1 to 4 times per year    Active Member of [employer] or Organizations: No    Attends Club or Organization Meetings: Never    Marital Status: Never    Intimate Partner Violence: Not At Risk    Fear of Current or Ex-Partner: No    Emotionally Abused: No    Physically Abused: No    Sexually Abused: No   Housing Stability: Not on file     Social History     Substance and Sexual Activity   Alcohol Use No     Social History     Substance and Sexual Activity   Drug Use No     Social History     Tobacco Use   Smoking Status Former Smoker    Packs/day: 0 50    Years: 10 00    Pack years: 5 00    Quit date: 2019    Years since quitting: 3 2   Smokeless Tobacco Never Used       Past Family History  Family History   Problem Relation Age of Onset    Lung cancer Mother    Franky Gordon Brain cancer Mother     Diabetes Father     Depression Father     Other Father         septic     Allergies Sister     Hashimoto's thyroiditis Sister     Abdominal aortic aneurysm Sister     Diabetes Sister     No Known Problems Sister     Hodgkin's lymphoma Brother     No Known Problems Brother     Diabetes Other        Current Medications  Current Outpatient Medications   Medication Sig Dispense Refill    acetaminophen (TYLENOL) 325 mg tablet Take 650 mg by mouth as needed for mild pain       apixaban (Eliquis) 5 mg Take 1 tablet (5 mg total) by mouth 2 (two) times a day 180 tablet 5    ascorbic acid (VITAMIN C) 1000 MG tablet Take 1,000 mg by mouth daily      butalbital-acetaminophen-caffeine (FIORICET,ESGIC) -40 mg per tablet TAKE ONE TABLET BY MOUTH EVERY SIX HOURS AS NEEDED FOR HEADACHES 30 tablet 0    Carafate 1 GM/10ML suspension Take 10 mL (1 g total) by mouth 4 (four) times a day 3600 mL 1    cephalexin (KEFLEX) 250 mg capsule  (Patient not taking: Reported on 2/8/2022 )      cephalexin (KEFLEX) 500 mg capsule       cetirizine (ZyrTEC) 10 mg tablet Take 1 tablet (10 mg total) by mouth daily for 365 doses 30 tablet 11    chlordiazepoxide-clidinium (LIBRAX) 5-2 5 mg per capsule Take 1 capsule by mouth 2 (two) times a day before meals 60 capsule 5    chlorhexidine (PERIDEX) 0 12 % solution       Cholecalciferol 25 MCG (1000 UT) tablet Take 1,000 Units by mouth daily      Cholecalciferol 25 MCG (1000 UT) tablet Take 1,000 Units by mouth 2 (two) times a day (Patient not taking: Reported on 1/18/2022 )      clotrimazole-betamethasone (LOTRISONE) 1-0 05 % cream       Dexilant 60 MG capsule Take 1 capsule (60 mg total) by mouth daily (Patient not taking: Reported on 3/24/2022 ) 90 capsule 0    ELDERBERRY PO Take by mouth daily  (Patient not taking: Reported on 3/24/2022 )      Epinastine HCl 0 05 % ophthalmic solution Administer 1 drop to both eyes 2 (two) times a day 5 mL 12    Evolocumab 140 MG/ML SOAJ Inject 1 mL (140 mg total) under the skin every 14 (fourteen) days 2 pen 3    fexofenadine (ALLEGRA) 180 MG tablet Take 1 tablet (180 mg total) by mouth daily IN AM 30 tablet 12    fluticasone-vilanterol (BREO ELLIPTA) 200-25 MCG/INH inhaler Inhale 1 puff daily Rinse mouth after use  3 Inhaler 3    furosemide (LASIX) 40 mg tablet Take 1 tablet (40 mg total) by mouth daily 90 tablet 3    hydrocortisone (ANUSOL-HC) 2 5 % rectal cream Insert rectally as needed  (Patient not taking: Reported on 2/8/2022 ) 28 g 5    ipratropium (ATROVENT) 0 06 % nasal spray       ketoconazole (NIZORAL) 2 % cream Apply topically daily 60 g 2    LORazepam (ATIVAN) 0 5 mg tablet Take 1 tablet (0 5 mg total) by mouth every 8 (eight) hours as needed for anxiety 90 tablet 0    Meth-Hyo-M Bl-Na Phos-Ph Sal (Uribel) 118 MG CAPS Take 1 each by mouth Three times a day      metoprolol succinate (TOPROL-XL) 100 mg 24 hr tablet Take 1 tablet (100 mg total) by mouth 2 (two) times a day 180 tablet 2    montelukast (SINGULAIR) 10 mg tablet TAKE ONE TABLET BY MOUTH AT BEDTIME  90 tablet 0    MULTIPLE VITAMIN PO Take by mouth      nystatin (MYCOSTATIN) powder       omeprazole (PriLOSEC) 20 mg delayed release capsule Take 1 capsule (20 mg total) by mouth daily 90 capsule 1    ondansetron (Zofran ODT) 4 mg disintegrating tablet Take 1 tablet (4 mg total) by mouth every 6 (six) hours as needed for nausea or vomiting 20 tablet 0    phenazopyridine (PYRIDIUM) 200 mg tablet Take 1 tablet (200 mg total) by mouth 3 (three) times a day with meals 10 tablet 0    pimecrolimus (ELIDEL) 1 % cream Apply topically to rash on eyelids and hands once or twice a daily   100 g 3    solifenacin (VESICARE) 5 mg tablet       spironolactone (ALDACTONE) 25 mg tablet TAKE ONE TABLET BY MOUTH TWICE DAILY 180 tablet 0    sucralfate (CARAFATE) 1 g tablet Take 1 tablet (1 g total) by mouth 4 (four) times a day 360 tablet 1    traMADol (Ultram) 50 mg tablet Take 1 tablet (50 mg total) by mouth 2 (two) times a day as needed for moderate pain (Patient not taking: Reported on 3/24/2022 ) 60 tablet 0    traMADol (ULTRAM) 50 mg tablet Take 1 tablet (50 mg total) by mouth every 8 (eight) hours as needed for severe pain 60 tablet 0    triamcinolone (KENALOG) 0 1 % cream Apply topically 2 (two) times a day 30 g 1    Turmeric 500 MG CAPS       Zinc 100 MG TABS Take by mouth daily       Current Facility-Administered Medications   Medication Dose Route Frequency Provider Last Rate Last Admin    cyanocobalamin injection 1,000 mcg  1,000 mcg Intramuscular Q30 Days Janeal Shahbaz, DO   1,000 mcg at 03/17/22 1435    Vitamin B-12 SUBL   Sublingual Once Janeal Shahbaz, DO           Allergies  Allergies   Allergen Reactions    Ace Inhibitors Angioedema and Anaphylaxis     Anaphylaxis    Other Other (See Comments) and Anaphylaxis     Other reaction(s): angioadema  Other reaction(s): Other (See Comments)  Preservatives- itching throat closes, hi  Other reaction(s): angioadema  E Z Cat - hives throat closes itching,  Preservatives- itching throat closes, hi    Ampicillin Hives    Benicar [Olmesartan] Angioedema     See Allergy note from 9/11/2008   Hydralazine      Lip swelling    Sulfa Antibiotics Other (See Comments)     stuffiness,itching,hives,throat closing    Valsartan Angioedema         The following portions of the patient's history were reviewed and updated as appropriate: allergies, current medications, past medical history, past social history, past surgical history and problem list       Vitals  There were no vitals filed for this visit  Physical Exam  Constitutional   General appearance: Patient is seated and in no acute distress, well appearing and well nourished     Head and Face   Head and face: Normal     Eyes   Conjunctiva and lids: No erythema, swelling or discharge  Anicteric  Ears, Nose, Mouth, and Throat   Hearing: Normal     Neck: Supple, trachea midline  Pulmonary   Respiratory effort: No increased work of breathing or signs of respiratory distress  Lungs: Clear to ascultation, no wheezes, rhonchi, or rales  Cardiovascular   Heart: Regular rate and rhythm, no murmurs gallops or rubs   Examination of extremities for edema and/or varicosities: Normal     Abdomen   Abdomen: Soft, no masses, no organomegaly, generalized tenderness  Normal bowel sounds  Musculoskeletal   Gait and station: Normal     Skin   Skin and subcutaneous tissue: Warm, dry, and intact  No visible jaundice, lesions or rashes  Psychiatric   Judgment and insight: Normal  Recent and remote memory:  Normal  Mood and affect: Normal       Results  No visits with results within 1 Day(s) from this visit     Latest known visit with results is:   Appointment on 02/03/2022   Component Date Value    Sodium 02/03/2022 140     Potassium 02/03/2022 3 9     Chloride 02/03/2022 101     CO2 02/03/2022 30     ANION GAP 02/03/2022 9     BUN 02/03/2022 10     Creatinine 02/03/2022 0 99     Glucose, Fasting 02/03/2022 125*    Calcium 02/03/2022 9 7     AST 02/03/2022 19     ALT 02/03/2022 28     Alkaline Phosphatase 02/03/2022 129*    Total Protein 02/03/2022 7 4     Albumin 02/03/2022 4 1     Total Bilirubin 02/03/2022 0 33     eGFR 02/03/2022 60     WBC 02/03/2022 5 74     RBC 02/03/2022 4 49     Hemoglobin 02/03/2022 13 2     Hematocrit 02/03/2022 40 5     MCV 02/03/2022 90     MCH 02/03/2022 29 4     MCHC 02/03/2022 32 6     RDW 02/03/2022 12 8     MPV 02/03/2022 9 6     Platelets 95/38/1270 259     nRBC 02/03/2022 0     Neutrophils Relative 02/03/2022 58     Immat GRANS % 02/03/2022 1     Lymphocytes Relative 02/03/2022 30     Monocytes Relative 02/03/2022 8     Eosinophils Relative 02/03/2022 2     Basophils Relative 02/03/2022 1     Neutrophils Absolute 02/03/2022 3 33     Immature Grans Absolute 02/03/2022 0 03     Lymphocytes Absolute 02/03/2022 1 73     Monocytes Absolute 02/03/2022 0 48     Eosinophils Absolute 02/03/2022 0 12     Basophils Absolute 02/03/2022 0 05     Cholesterol 02/03/2022 260*    Triglycerides 02/03/2022 276*    HDL, Direct 02/03/2022 47*    LDL Calculated 02/03/2022 158*    Non-HDL-Chol (CHOL-HDL) 02/03/2022 213     Lyme total antibody 02/03/2022 -1     TSH 3RD GENERATON 02/03/2022 2 779     Lipase 02/03/2022 80        Radiology Results  No results found  Orders  No orders of the defined types were placed in this encounter

## 2022-03-31 NOTE — TELEPHONE ENCOUNTER
Cancellation for Dr Mary Girard in [facility] for 04 07 22  Spoke to pt and moved her appt for EGD in June to 04 07 22 @[facility] with Dr Mary Girard    Pt has all instructions and was reminded to hold Eliquis 2 days prior

## 2022-04-04 DIAGNOSIS — N30.10 INTERSTITIAL CYSTITIS: ICD-10-CM

## 2022-04-04 RX ORDER — PHENAZOPYRIDINE HYDROCHLORIDE 200 MG/1
200 TABLET, FILM COATED ORAL
Qty: 10 TABLET | Refills: 0 | Status: SHIPPED | OUTPATIENT
Start: 2022-04-04 | End: 2022-04-08 | Stop reason: SDUPTHER

## 2022-04-07 ENCOUNTER — ANESTHESIA (OUTPATIENT)
Dept: GASTROENTEROLOGY | Facility: HOSPITAL | Age: 62
End: 2022-04-07

## 2022-04-07 ENCOUNTER — ANESTHESIA EVENT (OUTPATIENT)
Dept: GASTROENTEROLOGY | Facility: HOSPITAL | Age: 62
End: 2022-04-07

## 2022-04-07 ENCOUNTER — HOSPITAL ENCOUNTER (OUTPATIENT)
Dept: GASTROENTEROLOGY | Facility: HOSPITAL | Age: 62
Setting detail: OUTPATIENT SURGERY
Discharge: HOME | End: 2022-04-07
Attending: INTERNAL MEDICINE | Admitting: FAMILY MEDICINE
Payer: COMMERCIAL

## 2022-04-07 VITALS
BODY MASS INDEX: 35.68 KG/M2 | OXYGEN SATURATION: 97 % | HEART RATE: 69 BPM | WEIGHT: 209 LBS | SYSTOLIC BLOOD PRESSURE: 132 MMHG | TEMPERATURE: 97.2 F | HEIGHT: 64 IN | RESPIRATION RATE: 18 BRPM | DIASTOLIC BLOOD PRESSURE: 55 MMHG

## 2022-04-07 DIAGNOSIS — K25.3 ACUTE GASTRIC ULCER WITHOUT HEMORRHAGE OR PERFORATION: ICD-10-CM

## 2022-04-07 PROCEDURE — 88305 TISSUE EXAM BY PATHOLOGIST: CPT | Performed by: PATHOLOGY

## 2022-04-07 PROCEDURE — 43239 EGD BIOPSY SINGLE/MULTIPLE: CPT | Performed by: INTERNAL MEDICINE

## 2022-04-07 RX ORDER — SODIUM CHLORIDE 9 MG/ML
INJECTION, SOLUTION INTRAVENOUS CONTINUOUS PRN
Status: DISCONTINUED | OUTPATIENT
Start: 2022-04-07 | End: 2022-04-07

## 2022-04-07 RX ORDER — LIDOCAINE HYDROCHLORIDE 10 MG/ML
INJECTION, SOLUTION EPIDURAL; INFILTRATION; INTRACAUDAL; PERINEURAL AS NEEDED
Status: DISCONTINUED | OUTPATIENT
Start: 2022-04-07 | End: 2022-04-07

## 2022-04-07 RX ORDER — PROPOFOL 10 MG/ML
INJECTION, EMULSION INTRAVENOUS AS NEEDED
Status: DISCONTINUED | OUTPATIENT
Start: 2022-04-07 | End: 2022-04-07

## 2022-04-07 RX ADMIN — PROPOFOL 50 MG: 10 INJECTION, EMULSION INTRAVENOUS at 15:14

## 2022-04-07 RX ADMIN — LIDOCAINE HYDROCHLORIDE 50 MG: 10 INJECTION, SOLUTION EPIDURAL; INFILTRATION; INTRACAUDAL; PERINEURAL at 15:07

## 2022-04-07 RX ADMIN — SODIUM CHLORIDE: 0.9 INJECTION, SOLUTION INTRAVENOUS at 15:02

## 2022-04-07 RX ADMIN — PROPOFOL 100 MG: 10 INJECTION, EMULSION INTRAVENOUS at 15:08

## 2022-04-07 RX ADMIN — PROPOFOL 50 MG: 10 INJECTION, EMULSION INTRAVENOUS at 15:11

## 2022-04-07 NOTE — INTERVAL H&P NOTE
H&P reviewed  After examining the patient I find no changes in the patients condition since the H&P had been written      Vitals:    04/07/22 1418   BP: 158/69   Pulse: 63   Resp: 18   Temp: 98 8 °F (37 1 °C)   SpO2: 94%

## 2022-04-07 NOTE — ANESTHESIA PREPROCEDURE EVALUATION
Procedure:  EGD    Relevant Problems   CARDIO   (+) Angina pectoris (HCC)   (+) Benign essential hypertension   (+) WELLINGTON (dyspnea on exertion)   (+) Essential hypertension   (+) Familial hyperlipidemia   (+) Hyperlipidemia   (+) Hypertensive heart disease with congestive heart failure (HCC)   (+) Migraines   (+) Other chest pain   (+) Paroxysmal atrial fibrillation (HCC)      GI/HEPATIC   (+) Acute gastric ulcer without hemorrhage or perforation   (+) Gastroesophageal reflux disease without esophagitis      /RENAL   (+) Acute pyelonephritis      MUSCULOSKELETAL   (+) Arthritis   (+) Chronic low back pain   (+) Patellar tendinitis of left knee      NEURO/PSYCH   (+) Anxiety   (+) Anxiety and depression   (+) Chronic daily headache   (+) Chronic low back pain   (+) Continuous opioid dependence (HCC)   (+) Migraines      PULMONARY   (+) Acute upper respiratory infection   (+) Asthma due to seasonal allergies   (+) COPD (chronic obstructive pulmonary disease) (HCC)   (+) WELL[place] (dyspnea on exertion)   (+) Moderate persistent asthma without complication   (+) CAMILLE (obstructive sleep apnea)   (+) Shortness of breath      Other   (+) Angio-edema   (+) Elevated antinuclear antibody (SON) level   (+) Obesity (BMI 30-39  9)        Physical Exam    Airway    Mallampati score: II  TM Distance: >3 FB  Neck ROM: full     Dental       Cardiovascular  Cardiovascular exam normal    Pulmonary  Pulmonary exam normal     Other Findings        Anesthesia Plan  ASA Score- 3     Anesthesia Type- IV sedation with anesthesia with ASA Monitors  Additional Monitors:   Airway Plan:           Plan Factors-Exercise tolerance (METS): >4 METS  Chart reviewed  EKG reviewed  Imaging results reviewed  Existing labs reviewed  Patient summary reviewed  Induction- intravenous  Postoperative Plan- Plan for postoperative opioid use  Planned trial extubation    Informed Consent- Anesthetic plan and risks discussed with patient    I personally reviewed this patient with the CRNA  Discussed and agreed on the Anesthesia Plan with the CRNA  Jozef Sher

## 2022-04-08 DIAGNOSIS — N30.10 INTERSTITIAL CYSTITIS: ICD-10-CM

## 2022-04-08 RX ORDER — PHENAZOPYRIDINE HYDROCHLORIDE 200 MG/1
200 TABLET, FILM COATED ORAL
Qty: 10 TABLET | Refills: 0 | Status: SHIPPED | OUTPATIENT
Start: 2022-04-08 | End: 2022-04-15 | Stop reason: SDUPTHER

## 2022-04-10 NOTE — ANESTHESIA POSTPROCEDURE EVALUATION
Post-Op Assessment Note    CV Status:  Stable    Pain management: adequate  Multimodal analgesia used between 6 hours prior to anesthesia start to PACU discharge    Mental Status:  Sleepy   PONV Controlled:  Controlled   Airway Patency:  Patent   Two or more mitigation strategies used for obstructive sleep apnea   Post Op Vitals Reviewed: Yes      Staff: Anesthesiologist         No complications documented      BP      Temp     Pulse     Resp      SpO2

## 2022-04-12 ENCOUNTER — TELEPHONE (OUTPATIENT)
Dept: FAMILY MEDICINE CLINIC | Facility: CLINIC | Age: 62
End: 2022-04-12

## 2022-04-12 ENCOUNTER — TELEPHONE (OUTPATIENT)
Dept: GASTROENTEROLOGY | Facility: CLINIC | Age: 62
End: 2022-04-12

## 2022-04-12 NOTE — TELEPHONE ENCOUNTER
Patients GI provider:  CAM Velazquez    Number to return call: [phone]    Reason for call: Pt calling with questions in regards to her CT scan  Pt asking if she needs to get barium  CT scheduled for tomorrow      Scheduled procedure/appointment date if applicable: Appt: 9/98/6319

## 2022-04-12 NOTE — TELEPHONE ENCOUNTER
Called pt and informed CT is ordered just with IV contrast and not oral so does not need barium  Pt verbalized understanding and had no further questions

## 2022-04-13 ENCOUNTER — HOSPITAL ENCOUNTER (OUTPATIENT)
Dept: CT IMAGING | Facility: HOSPITAL | Age: 62
Discharge: HOME | End: 2022-04-13
Payer: COMMERCIAL

## 2022-04-13 DIAGNOSIS — I48.0 PAROXYSMAL ATRIAL FIBRILLATION (HCC): ICD-10-CM

## 2022-04-13 DIAGNOSIS — R10.31 RLQ ABDOMINAL PAIN: ICD-10-CM

## 2022-04-13 DIAGNOSIS — E78.49 OTHER HYPERLIPIDEMIA: Primary | ICD-10-CM

## 2022-04-13 PROCEDURE — 74177 CT ABD & PELVIS W/CONTRAST: CPT

## 2022-04-13 PROCEDURE — G1004 CDSM NDSC: HCPCS

## 2022-04-13 RX ADMIN — IOHEXOL 100 ML: 350 INJECTION, SOLUTION INTRAVENOUS at 18:43

## 2022-04-19 ENCOUNTER — TELEPHONE (OUTPATIENT)
Dept: GASTROENTEROLOGY | Facility: CLINIC | Age: 62
End: 2022-04-19

## 2022-04-19 ENCOUNTER — TELEPHONE (OUTPATIENT)
Dept: FAMILY MEDICINE CLINIC | Facility: CLINIC | Age: 62
End: 2022-04-19

## 2022-04-19 NOTE — TELEPHONE ENCOUNTER
Patient was denied for Ubrelvy 50 mg ((04/19/22) due to insurance were not able to establish medical necessity based on information submitted by doctor  Statewide preferred Drug List suggest [facility] because it is covered under plan  Awaiting response fr Dr Ayoub

## 2022-04-19 NOTE — TELEPHONE ENCOUNTER
Patients GI provider:  CAM Dickerson    Number to return call: [phone]    Reason for call: [place] calling from Radiology with immediate findings from pt's CT of abdomen and pelvis      Scheduled procedure/appointment date if applicable: Appt: 6/61/2494

## 2022-04-19 NOTE — TELEPHONE ENCOUNTER
I called and discussed findings with pt  Pt says her RLQ pain is not as severe and "a bit better" so she does not want to go to ED at this time  I explained that the CT, although does not show any periappendiceal inflammation, could be an indication for appendicitis and I would like her to talk to surgery team regarding this ASAP, however she again defers ED eval as her pain is not as severe at this time  She denies n/v, fevers, chills and says the pain is more of "an ache" at this time  She said she would be willing to see them as an OP but I do not want her waiting long for this as this is an urgent finding so I placed STAT referral  I asked the pt to go to the ED if she notices her pain does indeed worsen or she has any fevers, chills, n/v  She verbalized understanding  Nursing: can we please call this patient tomorrow morning/afternoon and see how she is feeling?  If she continues to have pain at that time and has not hears from gen surgery, please again recommend ED evaluation even if her pain is "not severe " Thank you

## 2022-04-20 ENCOUNTER — TELEPHONE (OUTPATIENT)
Dept: OBGYN CLINIC | Facility: MEDICAL CENTER | Age: 62
End: 2022-04-20

## 2022-04-20 ENCOUNTER — TELEPHONE (OUTPATIENT)
Dept: FAMILY MEDICINE CLINIC | Facility: CLINIC | Age: 62
End: 2022-04-20

## 2022-04-20 DIAGNOSIS — G47.33 OSA (OBSTRUCTIVE SLEEP APNEA): Primary | ICD-10-CM

## 2022-04-20 DIAGNOSIS — R51.9 CHRONIC DAILY HEADACHE: ICD-10-CM

## 2022-04-20 DIAGNOSIS — G43.001 MIGRAINE WITHOUT AURA AND WITH STATUS MIGRAINOSUS, NOT INTRACTABLE: ICD-10-CM

## 2022-04-20 NOTE — TELEPHONE ENCOUNTER
Pt does not want to proceed with D&C hysteroscopy at the moment  She is currently dealing with issues related to her appendix  She will call back when she feels ready

## 2022-04-20 NOTE — TELEPHONE ENCOUNTER
Called pt for symptom update  Reports pain has improved and rates 4/10 as well as having a little nausea this AM  Advised pt to go to ER given ongoing pain and has no heard back from [facility]  Pt refuses at this time as pain has improved  Provided [facility] scheduling phone number  Advised to go to ER should pain worsen or develop new symptoms  Pt agreeable to plan       Provider: Please review biopsy results

## 2022-04-20 NOTE — TELEPHONE ENCOUNTER
Process the above request through [facility] today 04/20/22 for [facility] in which [address] of [place] listed as  preferred on their Drug Listing

## 2022-04-20 NOTE — TELEPHONE ENCOUNTER
Erika Dinh PA-C  You 21 minutes ago (10:49 AM)     Esophageal bx WNL  Thank you! Reviewed biopsy results with pt, pt verbalized understanding and had no further questions      ANGEL has an appt with Dr Willy Monsivais next Friday

## 2022-04-22 ENCOUNTER — TELEPHONE (OUTPATIENT)
Dept: FAMILY MEDICINE CLINIC | Facility: CLINIC | Age: 62
End: 2022-04-22

## 2022-04-22 DIAGNOSIS — K55.069 MESENTERIC ARTERY THROMBOSIS (HCC): Primary | ICD-10-CM

## 2022-04-22 NOTE — TELEPHONE ENCOUNTER
Patient called in regarding spasms in her left leg and she is wondering if she can get a RX for potassium  Patient is also wondering what vascular doctor did you want her to see so she can call them because she did not get a call yet

## 2022-04-29 ENCOUNTER — CONSULT (OUTPATIENT)
Dept: SURGERY | Facility: CLINIC | Age: 62
End: 2022-04-29
Payer: COMMERCIAL

## 2022-04-29 ENCOUNTER — TELEPHONE (OUTPATIENT)
Dept: FAMILY MEDICINE CLINIC | Facility: CLINIC | Age: 62
End: 2022-04-29

## 2022-04-29 VITALS
HEART RATE: 67 BPM | TEMPERATURE: 91.9 F | DIASTOLIC BLOOD PRESSURE: 80 MMHG | HEIGHT: 64 IN | SYSTOLIC BLOOD PRESSURE: 120 MMHG | BODY MASS INDEX: 35.51 KG/M2 | OXYGEN SATURATION: 95 % | WEIGHT: 208 LBS

## 2022-04-29 DIAGNOSIS — K38.9 APPENDIX DISEASE: Primary | ICD-10-CM

## 2022-04-29 PROCEDURE — 99205 OFFICE O/P NEW HI 60 MIN: CPT | Performed by: SURGERY

## 2022-04-29 RX ORDER — DILTIAZEM HYDROCHLORIDE 120 MG/1
120 TABLET, FILM COATED ORAL AS NEEDED
COMMUNITY

## 2022-04-29 NOTE — PROGRESS NOTES
Assessment/Plan:    Appendix disease  15-year-old female who presents with chronic right lower quadrant pain, and CT evidence of a dilated appendix without evidence of infection or inflammation  Plan: We had a long conversation about appendix pathology, and the etiologies of her pain  On review of her CT scan her appendix rides low in the pelvis and crosses midline to the left side  I think it is unlikely that her chronic intermittent right lower quadrant pain is as result of pathology within her appendix itself  However, given the dilation in appendix up to 11 mm, we cannot rule out other pathology within her appendix  In theory a partially obstructing mass within the base of her appendix, or at the appendiceal orifice could cause dilation without evidence of inflammatory processes  After discussion of risks and benefits she will opt to proceed to the operating room for planned laparoscopic appendectomy  She will need cardiac risk stratification from her cardiologist, as well as an anticoagulation plan for her Eliquis  We will get her scheduled at her earliest convenience  There are no diagnoses linked to this encounter  Subjective:      Patient ID: Randall Carrel is a 57 y o  female  15-year-old female with chronic right lower quadrant pain presents to clinic today  Patient states that she has had approximately 1 year of on and off right lower quadrant pain that is significantly impacting the quality of her life  She states that the pain is sharp and crampy and intermittent in nature, but have bedside nearly a day-to-day basis  She denies any fevers, chills, chest pain, or shortness of breath  She states that bowel movements or dietary intake do not impact the nature or frequency of her pain  She recently underwent CT scan of the abdomen and pelvis which I reviewed in PACS which shows a dilated appendix up to 11 mm without appendicolith her periappendiceal inflammation    The appendix starts low in the abdomen, almost in the midline and traverses to the left of midline  The following portions of the patient's history were reviewed and updated as appropriate:   She  has a past medical history of A-fib ([facility] ) (03/2020), Allergic, Anxiety, Arthritis, Asthma, Colon polyp, Depression, GERD (gastroesophageal reflux disease), Heart murmur, Hives, Hyperlipidemia, Hypertension, Infertility, female, Migraine, Palpitations, Psychiatric disorder, and Wears glasses    She   Patient Active Problem List    Diagnosis Date Noted    Appendix disease 04/29/2022    Urinary retention 03/17/2022    Peripheral vascular disease ([facility] ) 03/17/2022    Hematoma 11/15/2021    Continuous opioid dependence ([facility] ) 11/15/2021    Acute cystitis without hematuria 09/22/2021    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    Hypoxia 08/30/2021    Chronic bronchitis, unspecified chronic bronchitis type ([facility] ) 08/30/2021    Left arm pain 08/30/2021    COPD (chronic obstructive pulmonary disease) ([facility] ) 08/19/2021    Diverticulitis 08/05/2021    Left upper quadrant pain 07/27/2021    Well adult exam 05/24/2021    Pyelonephritis 05/13/2021    Moderate persistent asthma without complication 48/76/1492    Vitamin B12 deficiency 03/18/2021    Unspecified diastolic (congestive) heart failure ([facility] ) 03/12/2021    Shortness of breath 02/11/2021    Hypertensive heart disease with congestive heart failure ([facility] ) 12/21/2020    CAMILLE (obstructive sleep apnea)     Acute pyelonephritis 11/04/2020    Lumbar radiculopathy 10/01/2020    Chronic fatigue 10/01/2020    Wheezing 09/21/2020    Injury of toe on right foot 09/01/2020    Patellar tendinitis of left knee 07/31/2020    Hair loss 07/09/2020    [place] (dyspnea on exertion) 06/11/2020    Vaginal candidiasis 04/09/2020    Paroxysmal atrial fibrillation ([facility] ) 03/19/2020    Low TSH level 02/06/2020    Other chest pain 01/27/2020    Vasomotor rhinitis 01/25/2020  Allergic conjunctivitis of both eyes 01/22/2020    Intrinsic atopic dermatitis 01/22/2020    Angio-edema 01/22/2020    Gastroesophageal reflux disease without esophagitis 08/12/2019    Tinea corporis 07/15/2019    Acute gastric ulcer without hemorrhage or perforation 06/12/2019    Palpitations and chest pain  04/02/2019    Intertrigo 04/02/2019    Allergic reaction 12/03/2018    Insomnia 11/16/2018    Snoring 11/16/2018    Cervicalgia 11/16/2018    Chronic daily headache 11/16/2018    AMD (age-related macular degeneration), bilateral 11/16/2018    Anxiety 10/19/2018    Angina pectoris ([facility] ) 11/15/2017    Female pelvic pain 09/25/2017    Arthritis 08/01/2017    Venous insufficiency 08/01/2017    Tick bite 07/14/2017    Acute upper respiratory infection 02/24/2017    Attention disturbance 01/26/2017    Familial hyperlipidemia 12/02/2016    Allergic rhinitis 10/14/2016    Anxiety and depression 10/14/2016    Asthma due to seasonal allergies 10/14/2016    Benign essential hypertension 10/14/2016    Interstitial cystitis 10/14/2016    Chronic low back pain 10/14/2016    Elevated antinuclear antibody (SON) level 10/14/2016    Elevated blood sugar 10/14/2016    Hyperlipidemia 10/14/2016    Migraines 10/14/2016    Essential hypertension 08/13/2015    Lipid disorder 08/13/2015    Tobacco abuse 08/13/2015    Obesity (BMI 30-39 9) 03/24/2011     She  has a past surgical history that includes Hand surgery; Exploratory laparotomy; Colonoscopy; EGD; [place] tooth extraction; and Ventriculoperitoneal shunt  Her family history includes Abdominal aortic aneurysm in her sister; Allergies in her sister; Brain cancer in her mother; Depression in her father; Diabetes in her father, other, and sister; Hashimoto's thyroiditis in her sister; Hodgkin's lymphoma in her brother; Lung cancer in her mother; No Known Problems in her brother and sister; Other in her father    She  reports that she quit smoking about 3 years ago  She has a 5 00 pack-year smoking history  She has never used smokeless tobacco  She reports that she does not drink alcohol and does not use drugs  Current Outpatient Medications   Medication Sig Dispense Refill    acetaminophen (TYLENOL) 325 mg tablet Take 650 mg by mouth as needed for mild pain       apixaban (Eliquis) 5 mg Take 1 tablet (5 mg total) by mouth 2 (two) times a day 180 tablet 5    ascorbic acid (VITAMIN C) 1000 MG tablet Take 1,000 mg by mouth daily      azelastine (OPTIVAR) 0 05 % ophthalmic solution Administer 1 drop to both eyes 2 (two) times a day 6 mL 12    azelastine (OPTIVAR) 0 05 % ophthalmic solution Administer 1 drop to both eyes 2 (two) times a day 6 mL 12    butalbital-acetaminophen-caffeine (FIORICET,ESGIC) -40 mg per tablet TAKE ONE TABLET BY MOUTH EVERY SIX HOURS AS NEEDED FOR HEADACHES 30 tablet 0    Carafate 1 GM/10ML suspension Take 10 mL (1 g total) by mouth 4 (four) times a day 3600 mL 1    cephalexin (KEFLEX) 250 mg capsule        cetirizine (ZyrTEC) 10 mg tablet Take 1 tablet (10 mg total) by mouth daily for 365 doses 30 tablet 11    chlordiazepoxide-clidinium (LIBRAX) 5-2 5 mg per capsule Take 1 capsule by mouth 2 (two) times a day before meals 60 capsule 5    chlorhexidine (PERIDEX) 0 12 % solution       Cholecalciferol 25 MCG (1000 UT) tablet Take 1,000 Units by mouth daily      clotrimazole-betamethasone (LOTRISONE) 1-0 05 % cream       diltiazem (CARDIZEM) 120 MG tablet Take 120 mg by mouth if needed (For heart palpitations)      ELDERBERRY PO Take by mouth daily        Evolocumab 140 MG/ML SOAJ Inject 1 mL (140 mg total) under the skin every 14 (fourteen) days 2 mL 5    fexofenadine (ALLEGRA) 180 MG tablet Take 1 tablet (180 mg total) by mouth daily IN AM 30 tablet 12    fluticasone-vilanterol (BREO ELLIPTA) 200-25 MCG/INH inhaler Inhale 1 puff daily Rinse mouth after use   3 Inhaler 3    furosemide (LASIX) 40 mg tablet Take 1 tablet (40 mg total) by mouth daily 90 tablet 3    Ginger, Zingiber officinalis, (GINGER PO) Take 1 tablet by mouth in the morning      hydrocortisone-pramoxine (PROCTOFOAM-HC) 1-1 % FOAM rectal foam Insert 1 applicator into the rectum 2 (two) times a day 10 g 2    ipratropium (ATROVENT) 0 06 % nasal spray       ketoconazole (NIZORAL) 2 % cream Apply topically daily 60 g 2    LORazepam (ATIVAN) 0 5 mg tablet Take 1 tablet (0 5 mg total) by mouth every 8 (eight) hours as needed for anxiety 90 tablet 0    Meth-Hyo-M Bl-Na Phos-Ph Sal (Uribel) 118 MG CAPS Take 1 each by mouth Three times a day      metoprolol succinate (TOPROL-XL) 100 mg 24 hr tablet Take 1 tablet (100 mg total) by mouth 2 (two) times a day 180 tablet 2    montelukast (SINGULAIR) 10 mg tablet TAKE ONE TABLET BY MOUTH AT BEDTIME  90 tablet 0    MULTIPLE VITAMIN PO Take by mouth      nystatin (MYCOSTATIN) powder       omeprazole (PriLOSEC) 20 mg delayed release capsule Take 1 capsule (20 mg total) by mouth daily 90 capsule 1    ondansetron (Zofran ODT) 4 mg disintegrating tablet Take 1 tablet (4 mg total) by mouth every 6 (six) hours as needed for nausea or vomiting 20 tablet 0    phenazopyridine (PYRIDIUM) 200 mg tablet Take 1 tablet (200 mg total) by mouth 3 (three) times a day with meals 10 tablet 0    pimecrolimus (ELIDEL) 1 % cream Apply topically to rash on eyelids and hands once or twice a daily   100 g 3    Probiotic Product (PROBIOTIC-10 PO) Take 1 tablet by mouth in the morning      Rimegepant Sulfate (NURTEC) 75 MG TBDP Take 75 mg by mouth daily as needed (PRN for migrains) 30 tablet 1    spironolactone (ALDACTONE) 25 mg tablet TAKE ONE TABLET BY MOUTH TWICE DAILY 180 tablet 0    sucralfate (CARAFATE) 1 g tablet Take 1 tablet (1 g total) by mouth 4 (four) times a day 360 tablet 1    SUMAtriptan (IMITREX) 100 mg tablet Take 100 mg by mouth once as needed      traMADol (ULTRAM) 50 mg tablet Take 1 tablet (50 mg total) by mouth every 8 (eight) hours as needed for severe pain 60 tablet 0    triamcinolone (KENALOG) 0 1 % cream Apply topically 2 (two) times a day 30 g 1    Turmeric 500 MG CAPS       Ubrogepant (UBRELVY) 50 MG tablet Take 1 tablet (50 mg) one time as needed for migraine  May repeat one additional tablet (50 mg) at least two hours after the first dose  Do not use more than two doses per day, or for more than eight days per month   10 tablet 2    Zinc 100 MG TABS Take by mouth daily       Current Facility-Administered Medications   Medication Dose Route Frequency Provider Last Rate Last Admin    cyanocobalamin injection 1,000 mcg  1,000 mcg Intramuscular Q30 Days Paralee Hannah, DO   1,000 mcg at 03/17/22 1571     Current Outpatient Medications on File Prior to Visit   Medication Sig    acetaminophen (TYLENOL) 325 mg tablet Take 650 mg by mouth as needed for mild pain     apixaban (Eliquis) 5 mg Take 1 tablet (5 mg total) by mouth 2 (two) times a day    ascorbic acid (VITAMIN C) 1000 MG tablet Take 1,000 mg by mouth daily    azelastine (OPTIVAR) 0 05 % ophthalmic solution Administer 1 drop to both eyes 2 (two) times a day    azelastine (OPTIVAR) 0 05 % ophthalmic solution Administer 1 drop to both eyes 2 (two) times a day    butalbital-acetaminophen-caffeine (FIORICET,ESGIC) -40 mg per tablet TAKE ONE TABLET BY MOUTH EVERY SIX HOURS AS NEEDED FOR HEADACHES    Carafate 1 GM/10ML suspension Take 10 mL (1 g total) by mouth 4 (four) times a day    cephalexin (KEFLEX) 250 mg capsule      cetirizine (ZyrTEC) 10 mg tablet Take 1 tablet (10 mg total) by mouth daily for 365 doses    chlordiazepoxide-clidinium (LIBRAX) 5-2 5 mg per capsule Take 1 capsule by mouth 2 (two) times a day before meals    chlorhexidine (PERIDEX) 0 12 % solution     Cholecalciferol 25 MCG (1000 UT) tablet Take 1,000 Units by mouth daily    clotrimazole-betamethasone (LOTRISONE) 1-0 05 % cream     diltiazem (CARDIZEM) 120 MG tablet Take 120 mg by mouth if needed (For heart palpitations)    ELDERBERRY PO Take by mouth daily      Evolocumab 140 MG/ML SOAJ Inject 1 mL (140 mg total) under the skin every 14 (fourteen) days    fexofenadine (ALLEGRA) 180 MG tablet Take 1 tablet (180 mg total) by mouth daily IN AM    fluticasone-vilanterol (BREO ELLIPTA) 200-25 MCG/INH inhaler Inhale 1 puff daily Rinse mouth after use   furosemide (LASIX) 40 mg tablet Take 1 tablet (40 mg total) by mouth daily    Ginger, Zingiber officinalis, (GINGER PO) Take 1 tablet by mouth in the morning    hydrocortisone-pramoxine (PROCTOFOAM-HC) 1-1 % FOAM rectal foam Insert 1 applicator into the rectum 2 (two) times a day    ipratropium (ATROVENT) 0 06 % nasal spray     ketoconazole (NIZORAL) 2 % cream Apply topically daily    LORazepam (ATIVAN) 0 5 mg tablet Take 1 tablet (0 5 mg total) by mouth every 8 (eight) hours as needed for anxiety    Meth-Hyo-M Bl-Na Phos-Ph Sal (Uribel) 118 MG CAPS Take 1 each by mouth Three times a day    metoprolol succinate (TOPROL-XL) 100 mg 24 hr tablet Take 1 tablet (100 mg total) by mouth 2 (two) times a day    montelukast (SINGULAIR) 10 mg tablet TAKE ONE TABLET BY MOUTH AT BEDTIME     MULTIPLE VITAMIN PO Take by mouth    nystatin (MYCOSTATIN) powder     omeprazole (PriLOSEC) 20 mg delayed release capsule Take 1 capsule (20 mg total) by mouth daily    ondansetron (Zofran ODT) 4 mg disintegrating tablet Take 1 tablet (4 mg total) by mouth every 6 (six) hours as needed for nausea or vomiting    phenazopyridine (PYRIDIUM) 200 mg tablet Take 1 tablet (200 mg total) by mouth 3 (three) times a day with meals    pimecrolimus (ELIDEL) 1 % cream Apply topically to rash on eyelids and hands once or twice a daily      Probiotic Product (PROBIOTIC-10 PO) Take 1 tablet by mouth in the morning    Rimegepant Sulfate (NURTEC) 75 MG TBDP Take 75 mg by mouth daily as needed (PRN for migrains)    spironolactone (ALDACTONE) 25 mg tablet TAKE ONE TABLET BY MOUTH TWICE DAILY    sucralfate (CARAFATE) 1 g tablet Take 1 tablet (1 g total) by mouth 4 (four) times a day    SUMAtriptan (IMITREX) 100 mg tablet Take 100 mg by mouth once as needed    traMADol (ULTRAM) 50 mg tablet Take 1 tablet (50 mg total) by mouth every 8 (eight) hours as needed for severe pain    triamcinolone (KENALOG) 0 1 % cream Apply topically 2 (two) times a day    Turmeric 500 MG CAPS     Ubrogepant (UBRELVY) 50 MG tablet Take 1 tablet (50 mg) one time as needed for migraine  May repeat one additional tablet (50 mg) at least two hours after the first dose  Do not use more than two doses per day, or for more than eight days per month   Zinc 100 MG TABS Take by mouth daily     Current Facility-Administered Medications on File Prior to Visit   Medication    cyanocobalamin injection 1,000 mcg     She is allergic to ace inhibitors, other, ampicillin, benicar [olmesartan], hydralazine, sulfa antibiotics, and valsartan       Review of Systems   Constitutional: Negative for chills, fatigue and fever  HENT: Negative for ear pain, facial swelling, sinus pressure and sinus pain  Eyes: Negative for pain  Respiratory: Negative for cough, shortness of breath and wheezing  Cardiovascular: Negative for chest pain  Gastrointestinal: Positive for abdominal pain  Negative for constipation, diarrhea, nausea and vomiting  Endocrine: Negative for cold intolerance and heat intolerance  Genitourinary: Negative for dysuria and flank pain  Musculoskeletal: Negative for back pain and neck pain  Skin: Negative for wound  Neurological: Negative for syncope, facial asymmetry, light-headedness and numbness  Psychiatric/Behavioral: Negative for behavioral problems, confusion and suicidal ideas           Objective:      /80 (BP Location: Left arm, Patient Position: Sitting, Cuff Size: Large)   Pulse 67   Temp (!) 91 9 °F (33 3 °C)   Ht 5' 4" (1 626 m)   Wt 94 3 kg (208 lb) LMP  (LMP Unknown)   SpO2 95%   BMI 35 70 kg/m²          Physical Exam  Vitals and nursing note reviewed  Constitutional:       General: She is not in acute distress  Appearance: Normal appearance  She is not toxic-appearing  HENT:      Head: Normocephalic and atraumatic  Mouth/Throat:      Mouth: Mucous membranes are moist    Eyes:      Extraocular Movements: Extraocular movements intact  Pupils: Pupils are equal, round, and reactive to light  Cardiovascular:      Rate and Rhythm: Normal rate and regular rhythm  Pulses: Normal pulses  Pulmonary:      Effort: Pulmonary effort is normal  No respiratory distress  Breath sounds: Normal breath sounds  No wheezing  Abdominal:      General: There is no distension  Palpations: Abdomen is soft  There is no mass  Tenderness: There is abdominal tenderness  There is no guarding or rebound  Hernia: No hernia is present  Comments: Mildly tender to palpation in the right lower quadrant, negative Rovsing sign  Musculoskeletal:         General: No swelling or deformity  Normal range of motion  Cervical back: Normal range of motion and neck supple  Right lower leg: No edema  Left lower leg: No edema  Skin:     General: Skin is warm and dry  Coloration: Skin is not jaundiced  Neurological:      General: No focal deficit present  Mental Status: She is alert and oriented to person, place, and time     Psychiatric:         Mood and Affect: Mood normal          Behavior: Behavior normal

## 2022-04-29 NOTE — H&P (VIEW-ONLY)
Assessment/Plan:    Appendix disease  29-year-old female who presents with chronic right lower quadrant pain, and CT evidence of a dilated appendix without evidence of infection or inflammation  Plan: We had a long conversation about appendix pathology, and the etiologies of her pain  On review of her CT scan her appendix rides low in the pelvis and crosses midline to the left side  I think it is unlikely that her chronic intermittent right lower quadrant pain is as result of pathology within her appendix itself  However, given the dilation in appendix up to 11 mm, we cannot rule out other pathology within her appendix  In theory a partially obstructing mass within the base of her appendix, or at the appendiceal orifice could cause dilation without evidence of inflammatory processes  After discussion of risks and benefits she will opt to proceed to the operating room for planned laparoscopic appendectomy  She will need cardiac risk stratification from her cardiologist, as well as an anticoagulation plan for her Eliquis  We will get her scheduled at her earliest convenience  There are no diagnoses linked to this encounter  Subjective:      Patient ID: Gomez Wilkinson is a 57 y o  female  29-year-old female with chronic right lower quadrant pain presents to clinic today  Patient states that she has had approximately 1 year of on and off right lower quadrant pain that is significantly impacting the quality of her life  She states that the pain is sharp and crampy and intermittent in nature, but have bedside nearly a day-to-day basis  She denies any fevers, chills, chest pain, or shortness of breath  She states that bowel movements or dietary intake do not impact the nature or frequency of her pain  She recently underwent CT scan of the abdomen and pelvis which I reviewed in PACS which shows a dilated appendix up to 11 mm without appendicolith her periappendiceal inflammation    The appendix starts low in the abdomen, almost in the midline and traverses to the left of midline  The following portions of the patient's history were reviewed and updated as appropriate:   She  has a past medical history of A-fib ([facility] ) (03/2020), Allergic, Anxiety, Arthritis, Asthma, Colon polyp, Depression, GERD (gastroesophageal reflux disease), Heart murmur, Hives, Hyperlipidemia, Hypertension, Infertility, female, Migraine, Palpitations, Psychiatric disorder, and Wears glasses    She   Patient Active Problem List    Diagnosis Date Noted    Appendix disease 04/29/2022    Urinary retention 03/17/2022    Peripheral vascular disease ([facility] ) 03/17/2022    Hematoma 11/15/2021    Continuous opioid dependence ([facility] ) 11/15/2021    Acute cystitis without hematuria 09/22/2021    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    Hypoxia 08/30/2021    Chronic bronchitis, unspecified chronic bronchitis type ([facility] ) 08/30/2021    Left arm pain 08/30/2021    COPD (chronic obstructive pulmonary disease) ([facility] ) 08/19/2021    Diverticulitis 08/05/2021    Left upper quadrant pain 07/27/2021    Well adult exam 05/24/2021    Pyelonephritis 05/13/2021    Moderate persistent asthma without complication 62/90/2794    Vitamin B12 deficiency 03/18/2021    Unspecified diastolic (congestive) heart failure ([facility] ) 03/12/2021    Shortness of breath 02/11/2021    Hypertensive heart disease with congestive heart failure ([facility] ) 12/21/2020    CAMILLE (obstructive sleep apnea)     Acute pyelonephritis 11/04/2020    Lumbar radiculopathy 10/01/2020    Chronic fatigue 10/01/2020    Wheezing 09/21/2020    Injury of toe on right foot 09/01/2020    Patellar tendinitis of left knee 07/31/2020    Hair loss 07/09/2020    WELLINGTON (dyspnea on exertion) 06/11/2020    Vaginal candidiasis 04/09/2020    Paroxysmal atrial fibrillation ([facility] ) 03/19/2020    Low TSH level 02/06/2020    Other chest pain 01/27/2020    Vasomotor rhinitis 01/25/2020  Allergic conjunctivitis of both eyes 01/22/2020    Intrinsic atopic dermatitis 01/22/2020    Angio-edema 01/22/2020    Gastroesophageal reflux disease without esophagitis 08/12/2019    Tinea corporis 07/15/2019    Acute gastric ulcer without hemorrhage or perforation 06/12/2019    Palpitations and chest pain  04/02/2019    Intertrigo 04/02/2019    Allergic reaction 12/03/2018    Insomnia 11/16/2018    Snoring 11/16/2018    Cervicalgia 11/16/2018    Chronic daily headache 11/16/2018    AMD (age-related macular degeneration), bilateral 11/16/2018    Anxiety 10/19/2018    Angina pectoris ([facility] ) 11/15/2017    Female pelvic pain 09/25/2017    Arthritis 08/01/2017    Venous insufficiency 08/01/2017    Tick bite 07/14/2017    Acute upper respiratory infection 02/24/2017    Attention disturbance 01/26/2017    Familial hyperlipidemia 12/02/2016    Allergic rhinitis 10/14/2016    Anxiety and depression 10/14/2016    Asthma due to seasonal allergies 10/14/2016    Benign essential hypertension 10/14/2016    Interstitial cystitis 10/14/2016    Chronic low back pain 10/14/2016    Elevated antinuclear antibody (SON) level 10/14/2016    Elevated blood sugar 10/14/2016    Hyperlipidemia 10/14/2016    Migraines 10/14/2016    Essential hypertension 08/13/2015    Lipid disorder 08/13/2015    Tobacco abuse 08/13/2015    Obesity (BMI 30-39 9) 03/24/2011     She  has a past surgical history that includes Hand surgery; Exploratory laparotomy; Colonoscopy; EGD; Lynn tooth extraction; and Ventriculoperitoneal shunt  Her family history includes Abdominal aortic aneurysm in her sister; Allergies in her sister; Brain cancer in her mother; Depression in her father; Diabetes in her father, other, and sister; Hashimoto's thyroiditis in her sister; Hodgkin's lymphoma in her brother; Lung cancer in her mother; No Known Problems in her brother and sister; Other in her father    She  reports that she quit smoking about 3 years ago  She has a 5 00 pack-year smoking history  She has never used smokeless tobacco  She reports that she does not drink alcohol and does not use drugs  Current Outpatient Medications   Medication Sig Dispense Refill    acetaminophen (TYLENOL) 325 mg tablet Take 650 mg by mouth as needed for mild pain       apixaban (Eliquis) 5 mg Take 1 tablet (5 mg total) by mouth 2 (two) times a day 180 tablet 5    ascorbic acid (VITAMIN C) 1000 MG tablet Take 1,000 mg by mouth daily      azelastine (OPTIVAR) 0 05 % ophthalmic solution Administer 1 drop to both eyes 2 (two) times a day 6 mL 12    azelastine (OPTIVAR) 0 05 % ophthalmic solution Administer 1 drop to both eyes 2 (two) times a day 6 mL 12    butalbital-acetaminophen-caffeine (FIORICET,ESGIC) -40 mg per tablet TAKE ONE TABLET BY MOUTH EVERY SIX HOURS AS NEEDED FOR HEADACHES 30 tablet 0    Carafate 1 GM/10ML suspension Take 10 mL (1 g total) by mouth 4 (four) times a day 3600 mL 1    cephalexin (KEFLEX) 250 mg capsule        cetirizine (ZyrTEC) 10 mg tablet Take 1 tablet (10 mg total) by mouth daily for 365 doses 30 tablet 11    chlordiazepoxide-clidinium (LIBRAX) 5-2 5 mg per capsule Take 1 capsule by mouth 2 (two) times a day before meals 60 capsule 5    chlorhexidine (PERIDEX) 0 12 % solution       Cholecalciferol 25 MCG (1000 UT) tablet Take 1,000 Units by mouth daily      clotrimazole-betamethasone (LOTRISONE) 1-0 05 % cream       diltiazem (CARDIZEM) 120 MG tablet Take 120 mg by mouth if needed (For heart palpitations)      ELDERBERRY PO Take by mouth daily        Evolocumab 140 MG/ML SOAJ Inject 1 mL (140 mg total) under the skin every 14 (fourteen) days 2 mL 5    fexofenadine (ALLEGRA) 180 MG tablet Take 1 tablet (180 mg total) by mouth daily IN AM 30 tablet 12    fluticasone-vilanterol (BREO ELLIPTA) 200-25 MCG/INH inhaler Inhale 1 puff daily Rinse mouth after use   3 Inhaler 3    furosemide (LASIX) 40 mg tablet Take 1 tablet (40 mg total) by mouth daily 90 tablet 3    Ginger, Zingiber officinalis, (GINGER PO) Take 1 tablet by mouth in the morning      hydrocortisone-pramoxine (PROCTOFOAM-HC) 1-1 % FOAM rectal foam Insert 1 applicator into the rectum 2 (two) times a day 10 g 2    ipratropium (ATROVENT) 0 06 % nasal spray       ketoconazole (NIZORAL) 2 % cream Apply topically daily 60 g 2    LORazepam (ATIVAN) 0 5 mg tablet Take 1 tablet (0 5 mg total) by mouth every 8 (eight) hours as needed for anxiety 90 tablet 0    Meth-Hyo-M Bl-Na Phos-Ph Sal (Uribel) 118 MG CAPS Take 1 each by mouth Three times a day      metoprolol succinate (TOPROL-XL) 100 mg 24 hr tablet Take 1 tablet (100 mg total) by mouth 2 (two) times a day 180 tablet 2    montelukast (SINGULAIR) 10 mg tablet TAKE ONE TABLET BY MOUTH AT BEDTIME  90 tablet 0    MULTIPLE VITAMIN PO Take by mouth      nystatin (MYCOSTATIN) powder       omeprazole (PriLOSEC) 20 mg delayed release capsule Take 1 capsule (20 mg total) by mouth daily 90 capsule 1    ondansetron (Zofran ODT) 4 mg disintegrating tablet Take 1 tablet (4 mg total) by mouth every 6 (six) hours as needed for nausea or vomiting 20 tablet 0    phenazopyridine (PYRIDIUM) 200 mg tablet Take 1 tablet (200 mg total) by mouth 3 (three) times a day with meals 10 tablet 0    pimecrolimus (ELIDEL) 1 % cream Apply topically to rash on eyelids and hands once or twice a daily   100 g 3    Probiotic Product (PROBIOTIC-10 PO) Take 1 tablet by mouth in the morning      Rimegepant Sulfate (NURTEC) 75 MG TBDP Take 75 mg by mouth daily as needed (PRN for migrains) 30 tablet 1    spironolactone (ALDACTONE) 25 mg tablet TAKE ONE TABLET BY MOUTH TWICE DAILY 180 tablet 0    sucralfate (CARAFATE) 1 g tablet Take 1 tablet (1 g total) by mouth 4 (four) times a day 360 tablet 1    SUMAtriptan (IMITREX) 100 mg tablet Take 100 mg by mouth once as needed      traMADol (ULTRAM) 50 mg tablet Take 1 tablet (50 mg total) by mouth every 8 (eight) hours as needed for severe pain 60 tablet 0    triamcinolone (KENALOG) 0 1 % cream Apply topically 2 (two) times a day 30 g 1    Turmeric 500 MG CAPS       Ubrogepant (UBRELVY) 50 MG tablet Take 1 tablet (50 mg) one time as needed for migraine  May repeat one additional tablet (50 mg) at least two hours after the first dose  Do not use more than two doses per day, or for more than eight days per month   10 tablet 2    Zinc 100 MG TABS Take by mouth daily       Current Facility-Administered Medications   Medication Dose Route Frequency Provider Last Rate Last Admin    cyanocobalamin injection 1,000 mcg  1,000 mcg Intramuscular Q30 Days Maria Esther Pryor, DO   1,000 mcg at 03/17/22 4717     Current Outpatient Medications on File Prior to Visit   Medication Sig    acetaminophen (TYLENOL) 325 mg tablet Take 650 mg by mouth as needed for mild pain     apixaban (Eliquis) 5 mg Take 1 tablet (5 mg total) by mouth 2 (two) times a day    ascorbic acid (VITAMIN C) 1000 MG tablet Take 1,000 mg by mouth daily    azelastine (OPTIVAR) 0 05 % ophthalmic solution Administer 1 drop to both eyes 2 (two) times a day    azelastine (OPTIVAR) 0 05 % ophthalmic solution Administer 1 drop to both eyes 2 (two) times a day    butalbital-acetaminophen-caffeine (FIORICET,ESGIC) -40 mg per tablet TAKE ONE TABLET BY MOUTH EVERY SIX HOURS AS NEEDED FOR HEADACHES    Carafate 1 GM/10ML suspension Take 10 mL (1 g total) by mouth 4 (four) times a day    cephalexin (KEFLEX) 250 mg capsule      cetirizine (ZyrTEC) 10 mg tablet Take 1 tablet (10 mg total) by mouth daily for 365 doses    chlordiazepoxide-clidinium (LIBRAX) 5-2 5 mg per capsule Take 1 capsule by mouth 2 (two) times a day before meals    chlorhexidine (PERIDEX) 0 12 % solution     Cholecalciferol 25 MCG (1000 UT) tablet Take 1,000 Units by mouth daily    clotrimazole-betamethasone (LOTRISONE) 1-0 05 % cream     diltiazem (CARDIZEM) 120 MG tablet Take 120 mg by mouth if needed (For heart palpitations)    ELDERBERRY PO Take by mouth daily      Evolocumab 140 MG/ML SOAJ Inject 1 mL (140 mg total) under the skin every 14 (fourteen) days    fexofenadine (ALLEGRA) 180 MG tablet Take 1 tablet (180 mg total) by mouth daily IN AM    fluticasone-vilanterol (BREO ELLIPTA) 200-25 MCG/INH inhaler Inhale 1 puff daily Rinse mouth after use   furosemide (LASIX) 40 mg tablet Take 1 tablet (40 mg total) by mouth daily    Ginger, Zingiber officinalis, (GINGER PO) Take 1 tablet by mouth in the morning    hydrocortisone-pramoxine (PROCTOFOAM-HC) 1-1 % FOAM rectal foam Insert 1 applicator into the rectum 2 (two) times a day    ipratropium (ATROVENT) 0 06 % nasal spray     ketoconazole (NIZORAL) 2 % cream Apply topically daily    LORazepam (ATIVAN) 0 5 mg tablet Take 1 tablet (0 5 mg total) by mouth every 8 (eight) hours as needed for anxiety    Meth-Hyo-M Bl-Na Phos-Ph Sal (Uribel) 118 MG CAPS Take 1 each by mouth Three times a day    metoprolol succinate (TOPROL-XL) 100 mg 24 hr tablet Take 1 tablet (100 mg total) by mouth 2 (two) times a day    montelukast (SINGULAIR) 10 mg tablet TAKE ONE TABLET BY MOUTH AT BEDTIME     MULTIPLE VITAMIN PO Take by mouth    nystatin (MYCOSTATIN) powder     omeprazole (PriLOSEC) 20 mg delayed release capsule Take 1 capsule (20 mg total) by mouth daily    ondansetron (Zofran ODT) 4 mg disintegrating tablet Take 1 tablet (4 mg total) by mouth every 6 (six) hours as needed for nausea or vomiting    phenazopyridine (PYRIDIUM) 200 mg tablet Take 1 tablet (200 mg total) by mouth 3 (three) times a day with meals    pimecrolimus (ELIDEL) 1 % cream Apply topically to rash on eyelids and hands once or twice a daily      Probiotic Product (PROBIOTIC-10 PO) Take 1 tablet by mouth in the morning    Rimegepant Sulfate (NURTEC) 75 MG TBDP Take 75 mg by mouth daily as needed (PRN for migrains)    spironolactone (ALDACTONE) 25 mg tablet TAKE ONE TABLET BY MOUTH TWICE DAILY    sucralfate (CARAFATE) 1 g tablet Take 1 tablet (1 g total) by mouth 4 (four) times a day    SUMAtriptan (IMITREX) 100 mg tablet Take 100 mg by mouth once as needed    traMADol (ULTRAM) 50 mg tablet Take 1 tablet (50 mg total) by mouth every 8 (eight) hours as needed for severe pain    triamcinolone (KENALOG) 0 1 % cream Apply topically 2 (two) times a day    Turmeric 500 MG CAPS     Ubrogepant (UBRELVY) 50 MG tablet Take 1 tablet (50 mg) one time as needed for migraine  May repeat one additional tablet (50 mg) at least two hours after the first dose  Do not use more than two doses per day, or for more than eight days per month   Zinc 100 MG TABS Take by mouth daily     Current Facility-Administered Medications on File Prior to Visit   Medication    cyanocobalamin injection 1,000 mcg     She is allergic to ace inhibitors, other, ampicillin, benicar [olmesartan], hydralazine, sulfa antibiotics, and valsartan       Review of Systems   Constitutional: Negative for chills, fatigue and fever  HENT: Negative for ear pain, facial swelling, sinus pressure and sinus pain  Eyes: Negative for pain  Respiratory: Negative for cough, shortness of breath and wheezing  Cardiovascular: Negative for chest pain  Gastrointestinal: Positive for abdominal pain  Negative for constipation, diarrhea, nausea and vomiting  Endocrine: Negative for cold intolerance and heat intolerance  Genitourinary: Negative for dysuria and flank pain  Musculoskeletal: Negative for back pain and neck pain  Skin: Negative for wound  Neurological: Negative for syncope, facial asymmetry, light-headedness and numbness  Psychiatric/Behavioral: Negative for behavioral problems, confusion and suicidal ideas           Objective:      /80 (BP Location: Left arm, Patient Position: Sitting, Cuff Size: Large)   Pulse 67   Temp (!) 91 9 °F (33 3 °C)   Ht 5' 4" (1 626 m)   Wt 94 3 kg (208 lb) LMP  (LMP Unknown)   SpO2 95%   BMI 35 70 kg/m²          Physical Exam  Vitals and nursing note reviewed  Constitutional:       General: She is not in acute distress  Appearance: Normal appearance  She is not toxic-appearing  HENT:      Head: Normocephalic and atraumatic  Mouth/Throat:      Mouth: Mucous membranes are moist    Eyes:      Extraocular Movements: Extraocular movements intact  Pupils: Pupils are equal, round, and reactive to light  Cardiovascular:      Rate and Rhythm: Normal rate and regular rhythm  Pulses: Normal pulses  Pulmonary:      Effort: Pulmonary effort is normal  No respiratory distress  Breath sounds: Normal breath sounds  No wheezing  Abdominal:      General: There is no distension  Palpations: Abdomen is soft  There is no mass  Tenderness: There is abdominal tenderness  There is no guarding or rebound  Hernia: No hernia is present  Comments: Mildly tender to palpation in the right lower quadrant, negative Rovsing sign  Musculoskeletal:         General: No swelling or deformity  Normal range of motion  Cervical back: Normal range of motion and neck supple  Right lower leg: No edema  Left lower leg: No edema  Skin:     General: Skin is warm and dry  Coloration: Skin is not jaundiced  Neurological:      General: No focal deficit present  Mental Status: She is alert and oriented to person, place, and time     Psychiatric:         Mood and Affect: Mood normal          Behavior: Behavior normal

## 2022-04-29 NOTE — ASSESSMENT & PLAN NOTE
68-year-old female who presents with chronic right lower quadrant pain, and CT evidence of a dilated appendix without evidence of infection or inflammation  Plan: We had a long conversation about appendix pathology, and the etiologies of her pain  On review of her CT scan her appendix rides low in the pelvis and crosses midline to the left side  I think it is unlikely that her chronic intermittent right lower quadrant pain is as result of pathology within her appendix itself  However, given the dilation in appendix up to 11 mm, we cannot rule out other pathology within her appendix  In theory a partially obstructing mass within the base of her appendix, or at the appendiceal orifice could cause dilation without evidence of inflammatory processes  After discussion of risks and benefits she will opt to proceed to the operating room for planned laparoscopic appendectomy  She will need cardiac risk stratification from her cardiologist, as well as an anticoagulation plan for her Eliquis  We will get her scheduled at her earliest convenience

## 2022-05-01 ENCOUNTER — APPOINTMENT (OUTPATIENT)
Dept: LAB | Facility: HOSPITAL | Age: 62
End: 2022-05-01
Attending: SURGERY
Payer: COMMERCIAL

## 2022-05-01 DIAGNOSIS — K37 APPENDICITIS, UNSPECIFIED APPENDICITIS TYPE: ICD-10-CM

## 2022-05-01 LAB
ALBUMIN SERPL BCP-MCNC: 4.3 G/DL (ref 3.5–5)
ALP SERPL-CCNC: 117 U/L (ref 46–116)
ALT SERPL W P-5'-P-CCNC: 32 U/L (ref 12–78)
ANION GAP SERPL CALCULATED.3IONS-SCNC: 9 MMOL/L (ref 4–13)
AST SERPL W P-5'-P-CCNC: 23 U/L (ref 5–45)
BASOPHILS # BLD AUTO: 0.04 THOUSANDS/ΜL (ref 0–0.1)
BASOPHILS NFR BLD AUTO: 1 % (ref 0–1)
BILIRUB SERPL-MCNC: 0.67 MG/DL (ref 0.2–1)
BUN SERPL-MCNC: 15 MG/DL (ref 5–25)
CALCIUM SERPL-MCNC: 9.2 MG/DL (ref 8.3–10.1)
CHLORIDE SERPL-SCNC: 98 MMOL/L (ref 100–108)
CO2 SERPL-SCNC: 29 MMOL/L (ref 21–32)
CREAT SERPL-MCNC: 0.98 MG/DL (ref 0.6–1.3)
EOSINOPHIL # BLD AUTO: 0.13 THOUSAND/ΜL (ref 0–0.61)
EOSINOPHIL NFR BLD AUTO: 2 % (ref 0–6)
ERYTHROCYTE [DISTWIDTH] IN BLOOD BY AUTOMATED COUNT: 12.5 % (ref 11.6–15.1)
GFR SERPL CREATININE-BSD FRML MDRD: 61 ML/MIN/1.73SQ M
GLUCOSE P FAST SERPL-MCNC: 129 MG/DL (ref 65–99)
HCT VFR BLD AUTO: 41.9 % (ref 34.8–46.1)
HGB BLD-MCNC: 14.1 G/DL (ref 11.5–15.4)
IMM GRANULOCYTES # BLD AUTO: 0.02 THOUSAND/UL (ref 0–0.2)
IMM GRANULOCYTES NFR BLD AUTO: 0 % (ref 0–2)
LYMPHOCYTES # BLD AUTO: 1.57 THOUSANDS/ΜL (ref 0.6–4.47)
LYMPHOCYTES NFR BLD AUTO: 25 % (ref 14–44)
MCH RBC QN AUTO: 30.9 PG (ref 26.8–34.3)
MCHC RBC AUTO-ENTMCNC: 33.7 G/DL (ref 31.4–37.4)
MCV RBC AUTO: 92 FL (ref 82–98)
MONOCYTES # BLD AUTO: 0.46 THOUSAND/ΜL (ref 0.17–1.22)
MONOCYTES NFR BLD AUTO: 7 % (ref 4–12)
NEUTROPHILS # BLD AUTO: 4.04 THOUSANDS/ΜL (ref 1.85–7.62)
NEUTS SEG NFR BLD AUTO: 65 % (ref 43–75)
NRBC BLD AUTO-RTO: 0 /100 WBCS
PLATELET # BLD AUTO: 276 THOUSANDS/UL (ref 149–390)
PMV BLD AUTO: 9.5 FL (ref 8.9–12.7)
POTASSIUM SERPL-SCNC: 3.8 MMOL/L (ref 3.5–5.3)
PROT SERPL-MCNC: 7.8 G/DL (ref 6.4–8.2)
RBC # BLD AUTO: 4.56 MILLION/UL (ref 3.81–5.12)
SODIUM SERPL-SCNC: 136 MMOL/L (ref 136–145)
WBC # BLD AUTO: 6.26 THOUSAND/UL (ref 4.31–10.16)

## 2022-05-01 PROCEDURE — 85025 COMPLETE CBC W/AUTO DIFF WBC: CPT

## 2022-05-01 PROCEDURE — 36415 COLL VENOUS BLD VENIPUNCTURE: CPT

## 2022-05-01 PROCEDURE — 80053 COMPREHEN METABOLIC PANEL: CPT

## 2022-05-02 ENCOUNTER — TELEPHONE (OUTPATIENT)
Dept: SURGERY | Facility: CLINIC | Age: 62
End: 2022-05-02

## 2022-05-02 ENCOUNTER — ANESTHESIA EVENT (OUTPATIENT)
Dept: PERIOP | Facility: HOSPITAL | Age: 62
End: 2022-05-02
Payer: COMMERCIAL

## 2022-05-02 ENCOUNTER — TELEPHONE (OUTPATIENT)
Dept: CARDIOLOGY CLINIC | Facility: CLINIC | Age: 62
End: 2022-05-02

## 2022-05-02 NOTE — TELEPHONE ENCOUNTER
Office of Dr Granville Apgar general sx @ [facility] is calling asking for Eliquis hold for patient faxed request late Friday pt is scheduled for laparoscopic appendectomy on 5/3 22  Requesting clearance and  eliquis hold instructions  Placed in [address] [place] [address] Rd will ask Dr Brooke Benavides to advise

## 2022-05-02 NOTE — TELEPHONE ENCOUNTER
Patient is scheduled to have surgery 5/3/22 have return to home transportation problems  She has a ride to return home at noon only I changed the start time to 7:30 am to help patient  Waiting for Dr Laquita De Anda to clear patient for surgery  Patient stated she stopped her Eliqus yesterday  [facility] was set up to come pick her up at 5:00 AM/ patient informed & understood

## 2022-05-02 NOTE — PRE-PROCEDURE INSTRUCTIONS
Pre-Surgery Instructions:   Medication Instructions    acetaminophen (TYLENOL) 325 mg tablet prn    apixaban (Eliquis) 5 mg last dose 5/1 am    ascorbic acid (VITAMIN C) 1000 MG tablet Hold day of surgery   azelastine (OPTIVAR) 0 05 % ophthalmic solution Take day of surgery   azelastine (OPTIVAR) 0 05 % ophthalmic solution Take day of surgery   butalbital-acetaminophen-caffeine (FIORICET,ESGIC) -40 mg per tablet Uses PRN- OK to take day of surgery    cephalexin (KEFLEX) 250 mg capsule Take day of surgery   cetirizine (ZyrTEC) 10 mg tablet Uses PRN- DO NOT take day of surgery    chlordiazepoxide-clidinium (LIBRAX) 5-2 5 mg per capsule Uses PRN- OK to take day of surgery    Cholecalciferol 25 MCG (1000 UT) tablet Hold day of surgery   diltiazem (CARDIZEM) 120 MG tablet Uses PRN- OK to take day of surgery    ELDERBERRY PO Hold day of surgery   Evolocumab 140 MG/ML SOAJ past month    fexofenadine (ALLEGRA) 180 MG tablet Uses PRN- DO NOT take day of surgery    furosemide (LASIX) 40 mg tablet Hold day of surgery   Pauline, Zingiber officinalis, (PAULINE PO) Hold day of surgery   ipratropium (ATROVENT) 0 06 % nasal spray Uses PRN- OK to take day of surgery    LORazepam (ATIVAN) 0 5 mg tablet Uses PRN- OK to take day of surgery    Meth-Hyo-M Bl-Na Phos-Ph Sal (Uribel) 118 MG CAPS Hold day of surgery   metoprolol succinate (TOPROL-XL) 100 mg 24 hr tablet Take day of surgery   montelukast (SINGULAIR) 10 mg tablet Take day of surgery   MULTIPLE VITAMIN PO Hold day of surgery   omeprazole (PriLOSEC) 20 mg delayed release capsule Take day of surgery   ondansetron (Zofran ODT) 4 mg disintegrating tablet Take day of surgery   phenazopyridine (PYRIDIUM) 200 mg tablet Hold day of surgery   pimecrolimus (ELIDEL) 1 % cream Hold day of surgery   Probiotic Product (PROBIOTIC-10 PO) Hold day of surgery   spironolactone (ALDACTONE) 25 mg tablet Hold day of surgery      sucralfate (CARAFATE) 1 g tablet Take day of surgery   traMADol (ULTRAM) 50 mg tablet Uses PRN- OK to take day of surgery    triamcinolone (KENALOG) 0 1 % cream Hold day of surgery   Turmeric 500 MG CAPS Hold day of surgery   Zinc 100 MG TABS Hold day of surgery  Pre procedure instructions given, verbalizes understanding  NPO after MN  Morning meds with water  Bathing reviewed

## 2022-05-02 NOTE — ANESTHESIA PREPROCEDURE EVALUATION
Procedure:  APPENDECTOMY LAPAROSCOPIC (N/A Abdomen)    Relevant Problems   CARDIO   (+) Angina pectoris (HCC)   (+) Benign essential hypertension   (+) WELLINGTON (dyspnea on exertion)   (+) Essential hypertension   (+) Familial hyperlipidemia   (+) Hyperlipidemia   (+) Hypertensive heart disease with congestive heart failure (HCC)   (+) Migraines   (+) Other chest pain   (+) Paroxysmal atrial fibrillation (HCC)      GI/HEPATIC   (+) Acute gastric ulcer without hemorrhage or perforation   (+) Gastroesophageal reflux disease without esophagitis      /RENAL   (+) Acute pyelonephritis      MUSCULOSKELETAL   (+) Arthritis   (+) Chronic low back pain   (+) Patellar tendinitis of left knee      NEURO/PSYCH   (+) Anxiety   (+) Anxiety and depression   (+) Chronic daily headache   (+) Chronic low back pain   (+) Continuous opioid dependence (HCC)   (+) Migraines      PULMONARY   (+) Acute upper respiratory infection   (+) Asthma due to seasonal allergies   (+) COPD (chronic obstructive pulmonary disease) (HCC)   (+) WELLINGTON (dyspnea on exertion)   (+) Moderate persistent asthma without complication   (+) CAMILLE (obstructive sleep apnea)   (+) Shortness of breath      Hypertension    Hyperlipidemia    Psychiatric disorder    Migraine    Colon polyp    Palpitations    GERD (gastroesophageal reflux disease)    Hives    Heart murmur    Infertility, female    Asthma    Arthritis    Wears glasses    A-fib (HCC)    Allergic    Anxiety    Depression        Physical Exam    Airway    Mallampati score: I  TM Distance: >3 FB  Neck ROM: full     Dental       Cardiovascular  Cardiovascular exam normal    Pulmonary  Pulmonary exam normal     Other Findings        Anesthesia Plan  ASA Score- 3     Anesthesia Type- general with ASA Monitors  Additional Monitors:   Airway Plan: ETT  Plan Factors-Exercise tolerance (METS): >4 METS  Chart reviewed  EKG reviewed  Imaging results reviewed  Existing labs reviewed   Patient summary reviewed  Induction- intravenous  Postoperative Plan- Plan for postoperative opioid use  Planned trial extubation    Informed Consent- Anesthetic plan and risks discussed with patient  I personally reviewed this patient with the CRNA  Discussed and agreed on the Anesthesia Plan with the CRNA  Primo Yates

## 2022-05-03 ENCOUNTER — NURSE TRIAGE (OUTPATIENT)
Dept: OTHER | Facility: OTHER | Age: 62
End: 2022-05-03

## 2022-05-03 ENCOUNTER — APPOINTMENT (EMERGENCY)
Dept: CT IMAGING | Facility: HOSPITAL | Age: 62
End: 2022-05-03
Payer: COMMERCIAL

## 2022-05-03 ENCOUNTER — HOSPITAL ENCOUNTER (EMERGENCY)
Facility: HOSPITAL | Age: 62
Discharge: HOME | End: 2022-05-04
Attending: EMERGENCY MEDICINE
Payer: COMMERCIAL

## 2022-05-03 ENCOUNTER — HOSPITAL ENCOUNTER (OUTPATIENT)
Facility: HOSPITAL | Age: 62
Setting detail: OUTPATIENT SURGERY
Discharge: HOME | End: 2022-05-03
Attending: SURGERY | Admitting: SURGERY
Payer: COMMERCIAL

## 2022-05-03 ENCOUNTER — ANESTHESIA (OUTPATIENT)
Dept: PERIOP | Facility: HOSPITAL | Age: 62
End: 2022-05-03
Payer: COMMERCIAL

## 2022-05-03 VITALS
WEIGHT: 208 LBS | OXYGEN SATURATION: 94 % | DIASTOLIC BLOOD PRESSURE: 74 MMHG | HEART RATE: 69 BPM | RESPIRATION RATE: 16 BRPM | BODY MASS INDEX: 35.51 KG/M2 | HEIGHT: 64 IN | SYSTOLIC BLOOD PRESSURE: 176 MMHG | TEMPERATURE: 96.2 F

## 2022-05-03 DIAGNOSIS — R06.00 DYSPNEA: Primary | ICD-10-CM

## 2022-05-03 DIAGNOSIS — K37 APPENDICITIS, UNSPECIFIED APPENDICITIS TYPE: ICD-10-CM

## 2022-05-03 DIAGNOSIS — I48.0 PAROXYSMAL ATRIAL FIBRILLATION (HCC): ICD-10-CM

## 2022-05-03 DIAGNOSIS — R04.2 HEMOPTYSIS: ICD-10-CM

## 2022-05-03 DIAGNOSIS — K38.9 APPENDIX DISEASE: Primary | ICD-10-CM

## 2022-05-03 LAB
ALBUMIN SERPL BCP-MCNC: 4.2 G/DL (ref 3.5–5)
ALP SERPL-CCNC: 117 U/L (ref 46–116)
ALT SERPL W P-5'-P-CCNC: 35 U/L (ref 12–78)
ANION GAP SERPL CALCULATED.3IONS-SCNC: 9 MMOL/L (ref 4–13)
AST SERPL W P-5'-P-CCNC: 38 U/L (ref 5–45)
BASOPHILS # BLD AUTO: 0.02 THOUSANDS/ΜL (ref 0–0.1)
BASOPHILS NFR BLD AUTO: 0 % (ref 0–1)
BILIRUB SERPL-MCNC: 0.9 MG/DL (ref 0.2–1)
BUN SERPL-MCNC: 20 MG/DL (ref 5–25)
CALCIUM SERPL-MCNC: 9.8 MG/DL (ref 8.3–10.1)
CARDIAC TROPONIN I PNL SERPL HS: 5 NG/L
CHLORIDE SERPL-SCNC: 99 MMOL/L (ref 100–108)
CO2 SERPL-SCNC: 28 MMOL/L (ref 21–32)
CREAT SERPL-MCNC: 0.89 MG/DL (ref 0.6–1.3)
EOSINOPHIL # BLD AUTO: 0 THOUSAND/ΜL (ref 0–0.61)
EOSINOPHIL NFR BLD AUTO: 0 % (ref 0–6)
ERYTHROCYTE [DISTWIDTH] IN BLOOD BY AUTOMATED COUNT: 12.1 % (ref 11.6–15.1)
FLUAV RNA RESP QL NAA+PROBE: NEGATIVE
FLUBV RNA RESP QL NAA+PROBE: NEGATIVE
GFR SERPL CREATININE-BSD FRML MDRD: 68 ML/MIN/1.73SQ M
GLUCOSE SERPL-MCNC: 213 MG/DL (ref 65–140)
HCT VFR BLD AUTO: 37.8 % (ref 34.8–46.1)
HGB BLD-MCNC: 12.8 G/DL (ref 11.5–15.4)
IMM GRANULOCYTES # BLD AUTO: 0.04 THOUSAND/UL (ref 0–0.2)
IMM GRANULOCYTES NFR BLD AUTO: 1 % (ref 0–2)
LYMPHOCYTES # BLD AUTO: 0.82 THOUSANDS/ΜL (ref 0.6–4.47)
LYMPHOCYTES NFR BLD AUTO: 10 % (ref 14–44)
MCH RBC QN AUTO: 30.9 PG (ref 26.8–34.3)
MCHC RBC AUTO-ENTMCNC: 33.9 G/DL (ref 31.4–37.4)
MCV RBC AUTO: 91 FL (ref 82–98)
MONOCYTES # BLD AUTO: 0.33 THOUSAND/ΜL (ref 0.17–1.22)
MONOCYTES NFR BLD AUTO: 4 % (ref 4–12)
NEUTROPHILS # BLD AUTO: 6.88 THOUSANDS/ΜL (ref 1.85–7.62)
NEUTS SEG NFR BLD AUTO: 85 % (ref 43–75)
NRBC BLD AUTO-RTO: 0 /100 WBCS
NT-PROBNP SERPL-MCNC: 258 PG/ML
PLATELET # BLD AUTO: 260 THOUSANDS/UL (ref 149–390)
PMV BLD AUTO: 9.3 FL (ref 8.9–12.7)
POTASSIUM SERPL-SCNC: 4.8 MMOL/L (ref 3.5–5.3)
PROT SERPL-MCNC: 7.5 G/DL (ref 6.4–8.2)
RBC # BLD AUTO: 4.14 MILLION/UL (ref 3.81–5.12)
RSV RNA RESP QL NAA+PROBE: NEGATIVE
SARS-COV-2 RNA RESP QL NAA+PROBE: NEGATIVE
SODIUM SERPL-SCNC: 136 MMOL/L (ref 136–145)
WBC # BLD AUTO: 8.09 THOUSAND/UL (ref 4.31–10.16)

## 2022-05-03 PROCEDURE — 88304 TISSUE EXAM BY PATHOLOGIST: CPT | Performed by: PATHOLOGY

## 2022-05-03 PROCEDURE — 0241U HB NFCT DS VIR RESP RNA 4 TRGT: CPT | Performed by: EMERGENCY MEDICINE

## 2022-05-03 PROCEDURE — 93005 ELECTROCARDIOGRAM TRACING: CPT

## 2022-05-03 PROCEDURE — 36415 COLL VENOUS BLD VENIPUNCTURE: CPT | Performed by: EMERGENCY MEDICINE

## 2022-05-03 PROCEDURE — 83880 ASSAY OF NATRIURETIC PEPTIDE: CPT | Performed by: EMERGENCY MEDICINE

## 2022-05-03 PROCEDURE — 71275 CT ANGIOGRAPHY CHEST: CPT

## 2022-05-03 PROCEDURE — 84484 ASSAY OF TROPONIN QUANT: CPT | Performed by: EMERGENCY MEDICINE

## 2022-05-03 PROCEDURE — 85025 COMPLETE CBC W/AUTO DIFF WBC: CPT | Performed by: EMERGENCY MEDICINE

## 2022-05-03 PROCEDURE — 99285 EMERGENCY DEPT VISIT HI MDM: CPT

## 2022-05-03 PROCEDURE — 80053 COMPREHEN METABOLIC PANEL: CPT | Performed by: EMERGENCY MEDICINE

## 2022-05-03 PROCEDURE — 44970 LAPAROSCOPY APPENDECTOMY: CPT | Performed by: SURGERY

## 2022-05-03 PROCEDURE — 99285 EMERGENCY DEPT VISIT HI MDM: CPT | Performed by: EMERGENCY MEDICINE

## 2022-05-03 RX ORDER — DEXAMETHASONE SODIUM PHOSPHATE 10 MG/ML
INJECTION, SOLUTION INTRAMUSCULAR; INTRAVENOUS AS NEEDED
Status: DISCONTINUED | OUTPATIENT
Start: 2022-05-03 | End: 2022-05-03

## 2022-05-03 RX ORDER — OXYCODONE HYDROCHLORIDE 5 MG/1
10 TABLET ORAL EVERY 4 HOURS PRN
Status: DISCONTINUED | OUTPATIENT
Start: 2022-05-03 | End: 2022-05-03 | Stop reason: HOSPADM

## 2022-05-03 RX ORDER — PROPOFOL 10 MG/ML
INJECTION, EMULSION INTRAVENOUS AS NEEDED
Status: DISCONTINUED | OUTPATIENT
Start: 2022-05-03 | End: 2022-05-03

## 2022-05-03 RX ORDER — ROCURONIUM BROMIDE 10 MG/ML
INJECTION, SOLUTION INTRAVENOUS AS NEEDED
Status: DISCONTINUED | OUTPATIENT
Start: 2022-05-03 | End: 2022-05-03

## 2022-05-03 RX ORDER — ONDANSETRON 2 MG/ML
INJECTION INTRAMUSCULAR; INTRAVENOUS AS NEEDED
Status: DISCONTINUED | OUTPATIENT
Start: 2022-05-03 | End: 2022-05-03

## 2022-05-03 RX ORDER — ONDANSETRON 2 MG/ML
4 INJECTION INTRAMUSCULAR; INTRAVENOUS ONCE AS NEEDED
Status: COMPLETED | OUTPATIENT
Start: 2022-05-03 | End: 2022-05-03

## 2022-05-03 RX ORDER — FENTANYL CITRATE 50 UG/ML
INJECTION, SOLUTION INTRAMUSCULAR; INTRAVENOUS AS NEEDED
Status: DISCONTINUED | OUTPATIENT
Start: 2022-05-03 | End: 2022-05-03

## 2022-05-03 RX ORDER — CLINDAMYCIN PHOSPHATE 900 MG/50ML
900 INJECTION INTRAVENOUS ONCE
Status: DISCONTINUED | OUTPATIENT
Start: 2022-05-03 | End: 2022-05-03 | Stop reason: HOSPADM

## 2022-05-03 RX ORDER — LIDOCAINE HYDROCHLORIDE 10 MG/ML
INJECTION, SOLUTION EPIDURAL; INFILTRATION; INTRACAUDAL; PERINEURAL AS NEEDED
Status: DISCONTINUED | OUTPATIENT
Start: 2022-05-03 | End: 2022-05-03

## 2022-05-03 RX ORDER — LABETALOL HYDROCHLORIDE 5 MG/ML
10 INJECTION, SOLUTION INTRAVENOUS ONCE
Status: DISCONTINUED | OUTPATIENT
Start: 2022-05-03 | End: 2022-05-03 | Stop reason: HOSPADM

## 2022-05-03 RX ORDER — FENTANYL CITRATE/PF 50 MCG/ML
25 SYRINGE (ML) INJECTION
Status: COMPLETED | OUTPATIENT
Start: 2022-05-03 | End: 2022-05-03

## 2022-05-03 RX ORDER — OXYCODONE HYDROCHLORIDE 5 MG/1
5 TABLET ORAL EVERY 4 HOURS PRN
Qty: 10 TABLET | Refills: 0 | Status: SHIPPED | OUTPATIENT
Start: 2022-05-03 | End: 2022-05-13

## 2022-05-03 RX ORDER — CLINDAMYCIN PHOSPHATE 900 MG/50ML
INJECTION INTRAVENOUS AS NEEDED
Status: DISCONTINUED | OUTPATIENT
Start: 2022-05-03 | End: 2022-05-03

## 2022-05-03 RX ORDER — OXYCODONE HYDROCHLORIDE 5 MG/1
5 TABLET ORAL EVERY 4 HOURS PRN
Status: DISCONTINUED | OUTPATIENT
Start: 2022-05-03 | End: 2022-05-03 | Stop reason: HOSPADM

## 2022-05-03 RX ORDER — MIDAZOLAM HYDROCHLORIDE 2 MG/2ML
INJECTION, SOLUTION INTRAMUSCULAR; INTRAVENOUS AS NEEDED
Status: DISCONTINUED | OUTPATIENT
Start: 2022-05-03 | End: 2022-05-03

## 2022-05-03 RX ORDER — SUCCINYLCHOLINE/SOD CL,ISO/PF 100 MG/5ML
SYRINGE (ML) INTRAVENOUS AS NEEDED
Status: DISCONTINUED | OUTPATIENT
Start: 2022-05-03 | End: 2022-05-03

## 2022-05-03 RX ORDER — ACETAMINOPHEN 325 MG/1
975 TABLET ORAL ONCE
Status: DISCONTINUED | OUTPATIENT
Start: 2022-05-03 | End: 2022-05-03 | Stop reason: HOSPADM

## 2022-05-03 RX ORDER — BUPIVACAINE HYDROCHLORIDE 2.5 MG/ML
INJECTION, SOLUTION EPIDURAL; INFILTRATION; INTRACAUDAL AS NEEDED
Status: DISCONTINUED | OUTPATIENT
Start: 2022-05-03 | End: 2022-05-03 | Stop reason: HOSPADM

## 2022-05-03 RX ORDER — SODIUM CHLORIDE, SODIUM LACTATE, POTASSIUM CHLORIDE, CALCIUM CHLORIDE 600; 310; 30; 20 MG/100ML; MG/100ML; MG/100ML; MG/100ML
125 INJECTION, SOLUTION INTRAVENOUS CONTINUOUS
Status: DISCONTINUED | OUTPATIENT
Start: 2022-05-03 | End: 2022-05-03 | Stop reason: HOSPADM

## 2022-05-03 RX ADMIN — PROPOFOL 150 MG: 10 INJECTION, EMULSION INTRAVENOUS at 07:36

## 2022-05-03 RX ADMIN — FENTANYL CITRATE 50 MCG: 50 INJECTION INTRAMUSCULAR; INTRAVENOUS at 07:36

## 2022-05-03 RX ADMIN — SODIUM CHLORIDE, SODIUM LACTATE, POTASSIUM CHLORIDE, AND CALCIUM CHLORIDE: .6; .31; .03; .02 INJECTION, SOLUTION INTRAVENOUS at 07:22

## 2022-05-03 RX ADMIN — FENTANYL CITRATE 50 MCG: 50 INJECTION INTRAMUSCULAR; INTRAVENOUS at 07:56

## 2022-05-03 RX ADMIN — Medication 25 MCG: at 09:13

## 2022-05-03 RX ADMIN — ONDANSETRON 4 MG: 2 INJECTION INTRAMUSCULAR; INTRAVENOUS at 09:32

## 2022-05-03 RX ADMIN — Medication 100 MG: at 07:36

## 2022-05-03 RX ADMIN — CLINDAMYCIN PHOSPHATE 900 MG: 900 INJECTION, SOLUTION INTRAVENOUS at 07:37

## 2022-05-03 RX ADMIN — Medication 25 MCG: at 08:58

## 2022-05-03 RX ADMIN — Medication 25 MCG: at 09:25

## 2022-05-03 RX ADMIN — IOHEXOL 85 ML: 350 INJECTION, SOLUTION INTRAVENOUS at 22:49

## 2022-05-03 RX ADMIN — LIDOCAINE HYDROCHLORIDE 50 MG: 10 INJECTION, SOLUTION EPIDURAL; INFILTRATION; INTRACAUDAL; PERINEURAL at 07:36

## 2022-05-03 RX ADMIN — Medication 25 MCG: at 08:43

## 2022-05-03 RX ADMIN — ONDANSETRON 4 MG: 2 INJECTION INTRAMUSCULAR; INTRAVENOUS at 08:09

## 2022-05-03 RX ADMIN — ROCURONIUM BROMIDE 30 MG: 50 INJECTION INTRAVENOUS at 07:48

## 2022-05-03 RX ADMIN — DEXAMETHASONE SODIUM PHOSPHATE 4 MG: 10 INJECTION, SOLUTION INTRAMUSCULAR; INTRAVENOUS at 07:47

## 2022-05-03 RX ADMIN — MIDAZOLAM 1 MG: 1 INJECTION INTRAMUSCULAR; INTRAVENOUS at 07:36

## 2022-05-03 NOTE — INTERVAL H&P NOTE
H&P reviewed  After examining the patient I find no changes in the patients condition since the H&P had been written  Will plan for laparoscopic appendectomy  Patient is amenable to risks and benefits, and we will proceed to the OR expeditiously       Vitals:    05/03/22 0625   BP: 118/64   Pulse: 61   Resp: 16   Temp: 98 5 °F (36 9 °C)   SpO2: 97%

## 2022-05-03 NOTE — OP NOTE
OPERATIVE REPORT  PATIENT NAME: King Owens    :  1960  MRN: [id number]  Pt Location: [facility] OR ROOM 04    SURGERY DATE: 5/3/2022    Surgeon(s) and Role:     * Robinson Mccarthy MD - Primary     * Ladan Garcia MD - Assisting    Preop Diagnosis:  Appendicitis, unspecified appendicitis type [K37]    Post-Op Diagnosis Codes:     * Appendicitis, unspecified appendicitis type [K37]    Procedure(s) (LRB):  APPENDECTOMY LAPAROSCOPIC (N/A)    Specimen(s):  ID Type Source Tests Collected by Time Destination   1 :  Tissue Appendix TISSUE EXAM Robinson Mccarthy MD 5/3/2022 0806        Estimated Blood Loss:   Minimal    Drains:  [REMOVED] Urethral Catheter Double-lumen; Latex 16 Fr  (Removed)   Number of days: 0       Anesthesia Type:   General    Operative Indications:  Appendicitis, unspecified appendicitis type [K40  75-year-old female with chronic right lower quadrant abdominal pain and CT evidence of a dilated appendix up to 1 cm, without surrounding inflammation  Due to these findings, and after discussion of risks and benefits, she opted to proceed to the operating room for planned laparoscopic appendectomy  Operative Findings: Moderately dilated appendix, with some mild injection into the appendiceal body, without surrounding inflammation  No other intra-abdominal pathology was noted  Terminal ileum was inspected, and there was no evidence of creeping fat or other external signs of inflammatory bowel disease  Complications:   None    Procedure and Technique:  The patient was seen in the Holding Area  The risks, benefits, complications, treatment options, and expected outcomes were discussed with the patient and/or family  There was concurrence with the proposed plan and informed consent was obtained  The site of surgery was properly noted/marked  The patient was taken to Operating Room, identified as King Owens and the procedure verified as Appendectomy      The patient was placed in the supine position with the left arm tucked and general anesthesia was induced, along with placement of orogastric tube  The abdomen was prepped and draped in a sterile fashion  A Timeout was preformed with all members of the surgical team confirming the correct patient and procedure  Antibiotic prophylaxis was given in accordance with the patient's stated allergies  An infraumbilical incision was made and a Veress needle was used to insufflate the abdomen to 15mmHg  The veress needle was removed and 12mm port was placed using an optical entry technique  Additional 5mm ports were placed under direct vision in the left lower quadrant and suprapubic positions  A careful evaluation of the entire abdomen was carried out  The patient was placed in Trendelenburg and left lateral decubitus position  The small intestines were retracted in the cephalad and left lateral direction away from the pelvis and right lower quadrant  The visualized appendix appeared to be dilated but not inflamed, and without evidence of perforation  No further pathology was noted in the abdomen  The appendix was carefully dissected  A window was made in the mesoappendix at the base of the appendix using a [place]  A endo-KI stapler with a blue load was fire at the base of the appendix at the level of the cecum  An ultrasonic electrosurgical device was then used to carefully divide the mesoappendix  There was no evidence of bleeding, leakage, or complication after division of the appendix and mesoappendix  Irrigation was performed and the irrigate suctioned from the abdomen and pelvic  The 12mm port site fascia was closed with an 0-Vicryl figure-of-eight using a laparoscopic suture passer  All skin incisions were closed using MonocryI suture in a subcuticular fashion  The instrument, sponge, and needle counts were correct at the conclusion of the case  The patient was then taken to PACU in stable condition        I was present for the entire procedure    Patient Disposition:  PACU  and hemodynamically stable      SIGNATURE: Shannan Dunn MD  DATE: May 3, 2022  TIME: 8:28 AM

## 2022-05-03 NOTE — ANESTHESIA POSTPROCEDURE EVALUATION
Post-Op Assessment Note    CV Status:  Stable  Pain Score: 0    Pain management: adequate     Mental Status:  Awake   Hydration Status:  Euvolemic   PONV Controlled:  None   Airway Patency:  Patent      Post Op Vitals Reviewed: Yes      Staff: Anesthesiologist, CRNA   Comments: report given to RN; VSS; facemask @6l/ min for transport        No complications documented      /63 (05/03/22 0915)    Temp 98 5 °F (36 9 °C) (05/03/22 0915)    Pulse 84 (05/03/22 0915)   Resp (!) 10 (05/03/22 0915)    SpO2 95 % (05/03/22 0915)

## 2022-05-03 NOTE — DISCHARGE INSTRUCTIONS
Post-Operative Care Instructions             Dr Taran MONCADA     1  General: Teressa Nissen will feel pulling sensations around the wound and/or aches and pains around the incisions  This is normal  Even minor surgery is a change in your body and this is your bodys way of reaction to it  If you have had abdominal surgery, it may help to support the incision with a small pillow or blanket for comfort when moving or coughing  2  Wound care:    Bandage/Dressing - Make sure to remove the bandage in about 48 hours, unless instructed otherwise  You usually don't have to redress the wound after 24-48 hours, unless for comfort  Keep the incision clean and dry  Let air get to it  If the Steri-Strips fall off, just keep the wound clean  Glue - Leave glue alone, it will fall off on its own, no need for an additional dressings    3  Water: You may shower over the wound, unless there are drain tubes left in place  Do not bathe or use a pool or hot tub until cleared by the physician  You may shower right over the staples, glue or Steri-Strips and rinse wound with soapy water but do not scrub incision pat dry when you are done  4  Activity: You may go up and down stairs, walk as much as you are comfortable, but walk at least 3 times each day  If you have had abdominal or hernia surgery, do not lift anything heavier than 15 pounds for at least 4 weeks  5  Diet: You may resume a regular diet  If you had a same-day surgery or overnight stay surgery, you may wish to eat lightly for a few days: soups, crackers, and sandwiches  You may resume a regular diet when ready  6  Medications: YOU MAY RESTART YOUR ELIQUIS Wednesday, 5/4/2022  Resume all of your other previous medications, unless told otherwise by the doctor  Tylenol and ibuoprofen is always fine, unless you are taking any narcotic pain medication containing Tylenol (such as Percocet, Darvocet, Vicodin, or anything containing acetaminophen)   Do not take Tylenol if you're taking these medications  You do not need to take the narcotic pain medications unless you are having significant pain and discomfort  7  Driving: He will need someone to drive you home on the day of surgery  Do not drive or make any important decisions while on narcotic pain medication or 24 hours and after anesthesia or sedation for surgery  Generally, you may drive when your off all narcotic pain medications, and you can turn in your seat comfortably to check your blind spot  8  Upset Stomach: You may take Maalox, Tums, or similar items for an upset stomach  If your narcotic pain medication causes an upset stomach, do not take it on an empty stomach  Try taking it with at least some crackers or toast      9  Constipation: Patients often experienced constipation after surgery  You may take over-the-counter medication for this, such as Metamucil, Senokot, Dulcolax, milk of magnesia, etc  You may take a suppository unless you have had anorectal surgery such as a procedure on your hemorrhoids  If you experience significant nausea or vomiting after abdominal surgery, call the office before trying any of these medications  10  Call the office: If you are experiencing any of the following, fevers above 101 5°, significant nausea or vomiting, if the wound develops drainage and/or is excessive redness around the wound, or if you have significant diarrhea or other worsening symptoms  11  Pain: You may be given a prescription for pain  This will be given to the hospital, the day of surgery  12  Sexual Activity: You may resume sexual activity when you feel ready and comfortable and your incision is sealed and healed without apparent infection risk      [facility]  Phone: [phone]

## 2022-05-04 ENCOUNTER — TELEPHONE (OUTPATIENT)
Dept: SURGERY | Facility: CLINIC | Age: 62
End: 2022-05-04

## 2022-05-04 VITALS
RESPIRATION RATE: 18 BRPM | WEIGHT: 210.54 LBS | SYSTOLIC BLOOD PRESSURE: 137 MMHG | BODY MASS INDEX: 36.14 KG/M2 | HEART RATE: 93 BPM | OXYGEN SATURATION: 96 % | TEMPERATURE: 98.1 F | DIASTOLIC BLOOD PRESSURE: 56 MMHG

## 2022-05-04 LAB
2HR DELTA HS TROPONIN: 0 NG/L
ATRIAL RATE: 65 BPM
ATRIAL RATE: 72 BPM
CARDIAC TROPONIN I PNL SERPL HS: 5 NG/L
P AXIS: 22 DEGREES
P AXIS: 54 DEGREES
PR INTERVAL: 166 MS
PR INTERVAL: 170 MS
QRS AXIS: -17 DEGREES
QRS AXIS: -23 DEGREES
QRSD INTERVAL: 88 MS
QRSD INTERVAL: 94 MS
QT INTERVAL: 396 MS
QT INTERVAL: 398 MS
QTC INTERVAL: 413 MS
QTC INTERVAL: 433 MS
T WAVE AXIS: -64 DEGREES
T WAVE AXIS: 53 DEGREES
VENTRICULAR RATE: 65 BPM
VENTRICULAR RATE: 72 BPM

## 2022-05-04 PROCEDURE — 36415 COLL VENOUS BLD VENIPUNCTURE: CPT | Performed by: EMERGENCY MEDICINE

## 2022-05-04 PROCEDURE — 93010 ELECTROCARDIOGRAM REPORT: CPT | Performed by: INTERNAL MEDICINE

## 2022-05-04 PROCEDURE — NC001 PR NO CHARGE: Performed by: EMERGENCY MEDICINE

## 2022-05-04 PROCEDURE — 93005 ELECTROCARDIOGRAM TRACING: CPT

## 2022-05-04 PROCEDURE — 84484 ASSAY OF TROPONIN QUANT: CPT | Performed by: EMERGENCY MEDICINE

## 2022-05-04 NOTE — TELEPHONE ENCOUNTER
Reason for Disposition   History of prior "blood clot" in leg or lungs (i e , deep vein thrombosis, pulmonary embolism)    Answer Assessment - Initial Assessment Questions  1  ONSET: "When did the cough begin?"       This evening  2  SEVERITY: "How bad is the cough today?" "Did the blood appear after a coughing spell?"       She feels sob  3  SPUTUM: "Describe the color of your sputum" (none, dry cough; clear, white, yellow, green)      bloody and green with a clot  4  HEMOPTYSIS: "How much blood?" (flecks, streaks, tablespoons, etc )      Smaller than a dime size clot  With other blood mixed in   5  DIFFICULTY BREATHING: "Are you having difficulty breathing?" If Yes, ask: "How bad is it?" (e g , mild, moderate, severe)     - MILD: No SOB at rest, mild SOB with walking, speaks normally in sentences, can lay down, no retractions, pulse < 100      - MODERATE: SOB at rest, SOB with minimal exertion and prefers to sit, cannot lie down flat, speaks in phrases, mild retractions, audible wheezing, pulse 100-120      - SEVERE: Very SOB at rest, speaks in single words, struggling to breathe, sitting hunched forward, retractions, pulse > 120       Sob is even with just sitting  6  FEVER: "Do you have a fever?" If Yes, ask: "What is your temperature, how was it measured, and when did it start?"      none  7  CARDIAC HISTORY: "Do you have any history of heart disease?" (e g , heart attack, congestive heart failure)       afib  8  LUNG HISTORY: "Do you have any history of lung disease?"  (e g , pulmonary embolus, asthma, emphysema)      asthma  9  PE RISK FACTORS: "Do you have a history of blood clots?" (or: recent major surgery, recent prolonged travel, bedridden)      Hx of clots  Pt is on eliquis for previous blood clots  10  OTHER SYMPTOMS: "Do you have any other symptoms?" (e g , runny nose, wheezing, chest pain)        No chest pain or wheezing  12   TRAVEL: "Have you traveled out of the country in the last month?" (e g , travel history, exposures)        no  Pulse ox is 92% bp 111/72    Protocols used: COUGHING UP BLOOD-ADULT-AH

## 2022-05-04 NOTE — TELEPHONE ENCOUNTER
Regarding: post surgery difficulty breathing   ----- Message from [facility] sent at 5/3/2022  8:14 PM EDT -----  "I had my appendix taken out today and I am coughing up a clot with blood a mucus and dark black color and I don't know if I should take my Eliqus and I feel nauseous and I am having issue with breathing

## 2022-05-04 NOTE — ED PROVIDER NOTES
2:19 AM  Patient was signed out to me earlier  Asked to follow up on CT scan and delta troponin  Both of these are negative for her complaints today  No active source of bleeding noted on CT scan  Patient with mild episode of hemoptysis which has not recurred here in the emergency department  She has been stable with normal and stable vital signs  EKG is nonischemic and delta troponin is 0  Patient is safe for discharge home with return ER precautions  Probable small abrasion from intubation earlier today       April DO Jim  05/04/22 9887

## 2022-05-04 NOTE — TELEPHONE ENCOUNTER
Received a message from patient last night at 7:41 c/o she was coughing up mucous and blood after her appendectomy yesterday  Checked her chart today and saw that she went to the ED for evaluation

## 2022-05-04 NOTE — ED PROVIDER NOTES
History  Chief Complaint   Patient presents with    Shortness of Breath     Pt reports sob, coughing up blood since 7pm  Reports recent surgery  Patient is a 78-year-old female  She does have a history of atrial fibrillation and is anticoagulated on Eliquis  Patient underwent appendectomy today  She was discharged  On arrival home patient felt some mucus in her chest   She cough and started coughing up some blood  She also reports some green sputum  No fever or chills  She does feel somewhat short of breath  No chest pain  No calf pain or unilateral leg swelling  No peripheral edema  Symptoms are moderate intensity  Aggravated by anticoagulant use  Prior to Admission Medications   Prescriptions Last Dose Informant Patient Reported? Taking?    Carafate 1 GM/10ML suspension  Self No No   Sig: Take 10 mL (1 g total) by mouth 4 (four) times a day   Cholecalciferol 25 MCG (1000 UT) tablet  Self Yes No   Sig: Take 1,000 Units by mouth daily   ELDERBERRY PO  Self Yes No   Sig: Take by mouth daily     Evolocumab 140 MG/ML SOAJ  Self No No   Sig: Inject 1 mL (140 mg total) under the skin every 14 (fourteen) days   Ginger, Zingiber officinalis, (GINGER PO)  Self Yes No   Sig: Take 1 tablet by mouth in the morning   LORazepam (ATIVAN) 0 5 mg tablet  Self No No   Sig: Take 1 tablet (0 5 mg total) by mouth every 8 (eight) hours as needed for anxiety   MULTIPLE VITAMIN PO  Self Yes No   Sig: Take by mouth   Meth-Hyo-M Bl-Na Phos-Ph Sal (Uribel) 118 MG CAPS  Self Yes No   Sig: Take 1 each by mouth Three times a day   Probiotic Product (PROBIOTIC-10 PO)  Self Yes No   Sig: Take 1 tablet by mouth in the morning   Rimegepant Sulfate (NURTEC) 75 MG TBDP  Self No No   Sig: Take 75 mg by mouth daily as needed (PRN for migrains)   SUMAtriptan (IMITREX) 100 mg tablet  Self Yes No   Sig: Take 100 mg by mouth once as needed   Turmeric 500 MG CAPS  Self Yes No   Ubrogepant (UBRELVY) 50 MG tablet  Self No No   Sig: Take 1 tablet (50 mg) one time as needed for migraine  May repeat one additional tablet (50 mg) at least two hours after the first dose  Do not use more than two doses per day, or for more than eight days per month  Zinc 100 MG TABS  Self Yes No   Sig: Take by mouth daily   acetaminophen (TYLENOL) 325 mg tablet  Self Yes No   Sig: Take 650 mg by mouth as needed for mild pain    apixaban (Eliquis) 5 mg  Self No No   Sig: Take 1 tablet (5 mg total) by mouth 2 (two) times a day   ascorbic acid (VITAMIN C) 1000 MG tablet  Self Yes No   Sig: Take 1,000 mg by mouth daily   azelastine (OPTIVAR) 0 05 % ophthalmic solution  Self No No   Sig: Administer 1 drop to both eyes 2 (two) times a day   azelastine (OPTIVAR) 0 05 % ophthalmic solution  Self No No   Sig: Administer 1 drop to both eyes 2 (two) times a day   butalbital-acetaminophen-caffeine (FIORICET,ESGIC) -40 mg per tablet  Self No No   Sig: TAKE ONE TABLET BY MOUTH EVERY SIX HOURS AS NEEDED FOR HEADACHES   cephalexin (KEFLEX) 250 mg capsule  Self Yes No   Sig:     cetirizine (ZyrTEC) 10 mg tablet  Self No No   Sig: Take 1 tablet (10 mg total) by mouth daily for 365 doses   chlordiazepoxide-clidinium (LIBRAX) 5-2 5 mg per capsule  Self No No   Sig: Take 1 capsule by mouth 2 (two) times a day before meals   chlorhexidine (PERIDEX) 0 12 % solution  Self Yes No   clotrimazole-betamethasone (LOTRISONE) 1-0 05 % cream  Self Yes No   diltiazem (CARDIZEM) 120 MG tablet   Yes No   Sig: Take 120 mg by mouth if needed (For heart palpitations)   fexofenadine (ALLEGRA) 180 MG tablet  Self No No   Sig: Take 1 tablet (180 mg total) by mouth daily IN AM   fluticasone-vilanterol (BREO ELLIPTA) 200-25 MCG/INH inhaler  Self No No   Sig: Inhale 1 puff daily Rinse mouth after use     furosemide (LASIX) 40 mg tablet  Self No No   Sig: Take 1 tablet (40 mg total) by mouth daily   hydrocortisone-pramoxine (PROCTOFOAM-HC) 1-1 % FOAM rectal foam  Self No No   Sig: Insert 1 applicator into the rectum 2 (two) times a day   ipratropium (ATROVENT) 0 06 % nasal spray  Self Yes No   ketoconazole (NIZORAL) 2 % cream  Self No No   Sig: Apply topically daily   metoprolol succinate (TOPROL-XL) 100 mg 24 hr tablet  Self No No   Sig: Take 1 tablet (100 mg total) by mouth 2 (two) times a day   montelukast (SINGULAIR) 10 mg tablet  Self No No   Sig: TAKE ONE TABLET BY MOUTH AT BEDTIME    nystatin (MYCOSTATIN) powder  Self Yes No   omeprazole (PriLOSEC) 20 mg delayed release capsule  Self No No   Sig: Take 1 capsule (20 mg total) by mouth daily   ondansetron (Zofran ODT) 4 mg disintegrating tablet  Self No No   Sig: Take 1 tablet (4 mg total) by mouth every 6 (six) hours as needed for nausea or vomiting   oxyCODONE (Roxicodone) 5 immediate release tablet   No No   Sig: Take 1 tablet (5 mg total) by mouth every 4 (four) hours as needed for moderate pain for up to 10 days Max Daily Amount: 30 mg   phenazopyridine (PYRIDIUM) 200 mg tablet  Self No No   Sig: Take 1 tablet (200 mg total) by mouth 3 (three) times a day with meals   pimecrolimus (ELIDEL) 1 % cream  Self No No   Sig: Apply topically to rash on eyelids and hands once or twice a daily     spironolactone (ALDACTONE) 25 mg tablet  Self No No   Sig: TAKE ONE TABLET BY MOUTH TWICE DAILY   sucralfate (CARAFATE) 1 g tablet  Self No No   Sig: Take 1 tablet (1 g total) by mouth 4 (four) times a day   traMADol (ULTRAM) 50 mg tablet  Self No No   Sig: Take 1 tablet (50 mg total) by mouth every 8 (eight) hours as needed for severe pain   triamcinolone (KENALOG) 0 1 % cream  Self No No   Sig: Apply topically 2 (two) times a day      Facility-Administered Medications Last Administration Doses Remaining   cyanocobalamin injection 1,000 mcg 3/17/2022  2:35 PM           Past Medical History:   Diagnosis Date    [facility]fib ([facility] ) 03/2020    Allergic     Anxiety     Arthritis     Asthma     Colon polyp     Depression     GERD (gastroesophageal reflux disease)     Heart murmur     Hives     Hyperlipidemia     Hypertension     Infertility, female     Migraine     Palpitations     Psychiatric disorder     Wears glasses        Past Surgical History:   Procedure Laterality Date    COLONOSCOPY      EGD      EXPLORATORY LAPAROTOMY      for infertility    HAND SURGERY      Hand excision of tendon cyst    [place] LAP,APPENDECTOMY N/A 5/3/2022    Procedure: APPENDECTOMY LAPAROSCOPIC;  Surgeon: Emilie Juarez MD;  Location: [facility];  Service: General    SUPERIOR MESTENTERIC ARTERY STENT      WISDOM TOOTH EXTRACTION         Family History   Problem Relation Age of Onset    Lung cancer Mother     Brain cancer Mother     Diabetes Father     Depression Father     Other Father         septic     Allergies Sister     Hashimoto's thyroiditis Sister     Abdominal aortic aneurysm Sister     Diabetes Sister     No Known Problems Sister     Hodgkin's lymphoma Brother     No Known Problems Brother     Diabetes Other      I have reviewed and agree with the history as documented  E-Cigarette/Vaping    E-Cigarette Use Never User      E-Cigarette/Vaping Substances    Nicotine No     THC No     CBD No     Flavoring No     Other No     Unknown No      Social History     Tobacco Use    Smoking status: Former Smoker     Packs/day: 0 50     Years: 10 00     Pack years: 5 00     Quit date: 2019     Years since quitting: 3 3    Smokeless tobacco: Never Used   Vaping Use    Vaping Use: Never used   Substance Use Topics    Alcohol use: No    Drug use: No       Review of Systems   Constitutional: Negative for chills and fever  HENT: Negative for rhinorrhea and sore throat  Eyes: Negative for pain, redness and visual disturbance  Respiratory: Positive for cough and shortness of breath  Cardiovascular: Negative for chest pain and leg swelling  Gastrointestinal: Positive for abdominal pain and nausea  Negative for diarrhea and vomiting     Endocrine: Negative for polydipsia and polyuria  Genitourinary: Negative for dysuria, frequency, hematuria, vaginal bleeding and vaginal discharge  Musculoskeletal: Negative for back pain and neck pain  Skin: Negative for rash and wound  Allergic/Immunologic: Negative for immunocompromised state  Neurological: Negative for weakness, numbness and headaches  Hematological: Does not bruise/bleed easily  Psychiatric/Behavioral: Negative for hallucinations and suicidal ideas  All other systems reviewed and are negative  Physical Exam  Physical Exam  Vitals reviewed  Constitutional:       General: She is not in acute distress  HENT:      Head: Normocephalic and atraumatic  Nose: Nose normal       Mouth/Throat:      Mouth: Mucous membranes are moist       Comments: There is some irritation to the epiglottis  Eyes:      General:         Right eye: No discharge  Left eye: No discharge  Conjunctiva/sclera: Conjunctivae normal    Cardiovascular:      Rate and Rhythm: Normal rate and regular rhythm  Pulses: Normal pulses  Heart sounds: Normal heart sounds  No murmur heard  No friction rub  No gallop  Pulmonary:      Effort: Pulmonary effort is normal  No respiratory distress  Breath sounds: Normal breath sounds  No stridor  No decreased breath sounds, wheezing, rhonchi or rales  Abdominal:      General: Bowel sounds are normal  There is no distension  Palpations: Abdomen is soft  Tenderness: There is no abdominal tenderness  There is no right CVA tenderness, left CVA tenderness, guarding or rebound  Musculoskeletal:         General: No swelling, tenderness, deformity or signs of injury  Normal range of motion  Cervical back: Normal range of motion and neck supple  No rigidity  Right lower leg: No tenderness  No edema  Left lower leg: No tenderness  No edema  Comments: No calf tenderness or unilateral leg swelling  Skin:     General: Skin is warm and dry  Coloration: Skin is not cyanotic or jaundiced  Findings: No rash  Neurological:      General: No focal deficit present  Mental Status: She is alert and oriented to person, place, and time  Sensory: No sensory deficit  Motor: Motor function is intact  Psychiatric:         Mood and Affect: Mood normal          Behavior: Behavior normal          Vital Signs  ED Triage Vitals [05/03/22 2055]   Temperature Pulse Respirations Blood Pressure SpO2   98 1 °F (36 7 °C) 82 20 (!) 172/77 93 %      Temp Source Heart Rate Source Patient Position - Orthostatic VS BP Location FiO2 (%)   Oral Monitor -- Right arm --      Pain Score       --           Vitals:    05/03/22 2055   BP: (!) 172/77   Pulse: 82         Visual Acuity      ED Medications  Medications - No data to display    Diagnostic Studies  Results Reviewed     Procedure Component Value Units Date/Time    COVID/FLU/RSV - 2 hour TAT [[id number]] Collected: 05/03/22 2131    Lab Status: In process Specimen: Nares from Nose Updated: 05/03/22 2147    CBC and differential [[id number]]  (Abnormal) Collected: 05/03/22 2131    Lab Status: Final result Specimen: Blood from Arm, Right Updated: 05/03/22 2147     WBC 8 09 Thousand/uL      RBC 4 14 Million/uL      Hemoglobin 12 8 g/dL      Hematocrit 37 8 %      MCV 91 fL      MCH 30 9 pg      MCHC 33 9 g/dL      RDW 12 1 %      MPV 9 3 fL      Platelets 062 Thousands/uL      nRBC 0 /100 WBCs      Neutrophils Relative 85 %      Immat GRANS % 1 %      Lymphocytes Relative 10 %      Monocytes Relative 4 %      Eosinophils Relative 0 %      Basophils Relative 0 %      Neutrophils Absolute 6 88 Thousands/µL      Immature Grans Absolute 0 04 Thousand/uL      Lymphocytes Absolute 0 82 Thousands/µL      Monocytes Absolute 0 33 Thousand/µL      Eosinophils Absolute 0 00 Thousand/µL      Basophils Absolute 0 02 Thousands/µL     Comprehensive metabolic panel [[id number]] Collected: 05/03/22 2131    Lab Status:  In process Specimen: Blood from Arm, Right Updated: 05/03/22 2145    NT-BNP PRO [[id number]] Collected: 05/03/22 2131    Lab Status: In process Specimen: Blood from Arm, Right Updated: 05/03/22 2145    HS Troponin 0hr (reflex protocol) [[id number]] Collected: 05/03/22 2131    Lab Status: In process Specimen: Blood from Arm, Right Updated: 05/03/22 2145                 CTA ED chest PE study    (Results Pending)              Procedures  ECG 12 Lead Documentation Only    Date/Time: 5/3/2022 9:20 PM  Performed by: Alexandra March MD  Authorized by: Alexandra March MD     ECG reviewed by me, the ED Provider: yes    Patient location:  ED  Interpretation:     Interpretation: abnormal    Comments:      NSR  LAE  LVH with repol abn  ED Course                                             MDM      Disposition  Final diagnoses:   None     ED Disposition     None      Follow-up Information    None         Patient's Medications   Discharge Prescriptions    No medications on file       No discharge procedures on file      PDMP Review       Value Time User    PDMP Reviewed  Yes 4/19/2022  5:02 PM Estella Shah DO          ED Provider  Electronically Signed by           Alexandra March MD  05/03/22 [id number]       Alexandra March MD  05/04/22 [id number]

## 2022-05-09 ENCOUNTER — OFFICE VISIT (OUTPATIENT)
Dept: GASTROENTEROLOGY | Facility: CLINIC | Age: 62
End: 2022-05-09
Payer: COMMERCIAL

## 2022-05-09 VITALS
SYSTOLIC BLOOD PRESSURE: 120 MMHG | HEIGHT: 64 IN | BODY MASS INDEX: 35.85 KG/M2 | TEMPERATURE: 97.8 F | DIASTOLIC BLOOD PRESSURE: 90 MMHG | WEIGHT: 210 LBS

## 2022-05-09 DIAGNOSIS — K59.09 OTHER CONSTIPATION: Primary | ICD-10-CM

## 2022-05-09 DIAGNOSIS — K76.0 FATTY LIVER: ICD-10-CM

## 2022-05-09 DIAGNOSIS — K36 CHRONIC APPENDICITIS: ICD-10-CM

## 2022-05-09 DIAGNOSIS — K21.9 GASTROESOPHAGEAL REFLUX DISEASE WITHOUT ESOPHAGITIS: ICD-10-CM

## 2022-05-09 PROCEDURE — 99214 OFFICE O/P EST MOD 30 MIN: CPT | Performed by: PHYSICIAN ASSISTANT

## 2022-05-09 RX ORDER — OMEPRAZOLE 40 MG/1
40 CAPSULE, DELAYED RELEASE ORAL DAILY
Qty: 309 CAPSULE | Refills: 4 | Status: SHIPPED | OUTPATIENT
Start: 2022-05-09

## 2022-05-09 NOTE — PROGRESS NOTES
[facility] - Outpatient Follow-up Note  King Owens 57 y o  female MRN: [id number]  Encounter: [id number]      Assessment and Plan    1  RLQ pain  The patient presented with lower abdominal/groin pain  CT scan was obtained and revealing her appendix, which was located just left of the midline, appeared abnormally thickened without distension, appendicolith, or periappendiceal inflammation  She underwent appendectomy 5/3/22  She states that this time she does have some left lower quadrant pain which sounds postoperative in nature  - bowel control as below  - call surgeon with worsening pain       2  History of PUD  3  GERD  The patient has acid reflux which is currently well controlled on omeprazole 40 mg every morning and OTC pepcid nightly  Prior EGD 5/28/19 revealed multiple superficial ulcers in the antrum with negative H pylori testing  Etiology thought to be NSAIDs  Repeat EGD 4/7/22 normal including esophogeal biopsies  - avoid NSAIDS   - continue daily omeprazole 40mg and OTC pepcid       4  Fatty liver  Seen on CT scan 11/29/21  Recent liver enzymes normal aside for an alk phos of 117  - routine monitoring of his CMP  - recommend healthy diet and routine exercise     5   IBS-C  6  Opioid dependence   7  Hemorrhoids   The patient has a history of IBS-C and is currently on tramadol  She is s/p appendectomy 5/3 and is currently on daily miralax and stool softeners with two bowel movements since surgery  - continue miralax and stool softeners      8  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HCC)  S/p stent at [facility]  Recent duplex study normal with the stent in tact and patent  Inferior mesenteric artery unable to be visualized secondary to overlying bowel gas  - no further workup at this time      9  Colon cancer screening  Last colonoscopy 5/28/19 revealed multiple large, moderate scattered diverticula in the sigmoid colon    - high fiber diet with a goal of 30g daily  - repeat colonoscopy 2029 or sooner if clinically indicated     Follow up with 3 months    ______________________________________________________________________    History of Present Illness  Fabiana Arambula is a 57 y o  female here for follow up evaluation of lower abdominal pain  CT scan was obtained and revealing her Pentax located just left of the midline appeared abnormally thickened without distension, appendicolith, or periappendiceal inflammation  She underwent appendectomy 5/3/22  She states that this time she does have some left lower quadrant pain and constipation which is now improving with MiraLax and Dulcolax  Review of Systems   Constitutional: Negative for activity change, appetite change, chills, fatigue, fever and unexpected weight change  HENT: Negative for sore throat and trouble swallowing  Respiratory: Negative for cough and choking  Gastrointestinal: Negative for abdominal distention, abdominal pain, anal bleeding, blood in stool, constipation, diarrhea, nausea, rectal pain and vomiting  Musculoskeletal: Negative for back pain and gait problem  Psychiatric/Behavioral: Negative for confusion  Past Medical History  Past Medical History:   Diagnosis Date    A-fib [facility]) 03/2020    Allergic     Anxiety     Arthritis     Asthma     Colon polyp     Depression     GERD (gastroesophageal reflux disease)     Heart murmur     Hives     Hyperlipidemia     Hypertension     Infertility, female     Migraine     Palpitations     Psychiatric disorder     Wears glasses        Past Social history  Past Surgical History:   Procedure Laterality Date    COLONOSCOPY      EGD      EXPLORATORY LAPAROTOMY      for infertility    HAND SURGERY      Hand excision of tendon cyst    SD LAP,APPENDECTOMY N/A 5/3/2022    Procedure: APPENDECTOMY LAPAROSCOPIC;  Surgeon:  Quiana Donis MD;  Location: [facility];  Service: General    [address] [facility]      WISDOM TOOTH EXTRACTION Social History     Socioeconomic History    Marital status:      Spouse name: Not on file    Number of children: 0    Years of education: Not on file    Highest education level: Not on file   Occupational History    Occupation: unemployed   Tobacco Use    Smoking status: Former Smoker     Packs/day: 0 50     Years: 10 00     Pack years: 5 00     Quit date: 2019     Years since quitting: 3 3    Smokeless tobacco: Never Used   Vaping Use    Vaping Use: Never used   Substance and Sexual Activity    Alcohol use: No    Drug use: No    Sexual activity: Not Currently   Other Topics Concern    Not on file   Social History Narrative    Who lives in your home: Alone     What type of home do you live in: [address]     Age of your home: 1950 built     How long have you been living there: 5 yrs     Type of heat: forced hot air     Type of fuel: electric     What type of jamin is in your bedroom: carpet jamin     Do you have the following in or near your home:    Air products: Humidifier in the bedroom/kitchen     Pests: none     Pets: none     Are pets allowed in bedroom: N/A     Open fields, wooded areas nearby: No     Basement: fpor-[id number]-prwaz-[id number] mold     Exposure to second hand smoke: No    Central air     Habits:    Caffeine: Hot tea 1 cup daily- ice tea 1 cup in the afternoon    Chocolate: Occasionally      Social Determinants of Health     Financial Resource Strain: Low Risk     Difficulty of Paying Living Expenses: Not hard at all   Food Insecurity: No Food Insecurity    Worried About Running Out of Food in the Last Year: Never true    Donell of Food in the Last Year: Never true   Transportation Needs: No Transportation Needs    Lack of Transportation (Medical): No    Lack of Transportation (Non-Medical): No   Physical Activity: Inactive    Days of Exercise per Week: 0 days    Minutes of Exercise per Session: 0 min   Stress: Not on file   Social Connections:  Moderately Isolated    Frequency of Communication with Friends and Family: More than three times a week    Frequency of Social Gatherings with Friends and Family: More than three times a week    Attends [employer] Services: 1 to 4 times per year    Active Member of [employer] or Organizations: No    Attends Club or Organization Meetings: Never    Marital Status: Never    Intimate Partner Violence: Not At Risk    Fear of Current or Ex-Partner: No    Emotionally Abused: No    Physically Abused: No    Sexually Abused: No   Housing Stability: Not on file     Social History     Substance and Sexual Activity   Alcohol Use No     Social History     Substance and Sexual Activity   Drug Use No     Social History     Tobacco Use   Smoking Status Former Smoker    Packs/day: 0 50    Years: 10 00    Pack years: 5 00    Quit date: 2019    Years since quitting: 3 3   Smokeless Tobacco Never Used       Past Family History  Family History   Problem Relation Age of Onset    Lung cancer Mother     Brain cancer Mother     Diabetes Father     Depression Father     Other Father         septic     Allergies Sister     Hashimoto's thyroiditis Sister     Abdominal aortic aneurysm Sister     Diabetes Sister     No Known Problems Sister     Hodgkin's lymphoma Brother     No Known Problems Brother     Diabetes Other        Current Medications  Current Outpatient Medications   Medication Sig Dispense Refill    acetaminophen (TYLENOL) 325 mg tablet Take 650 mg by mouth as needed for mild pain       apixaban (Eliquis) 5 mg Take 1 tablet (5 mg total) by mouth 2 (two) times a day 180 tablet 5    ascorbic acid (VITAMIN C) 1000 MG tablet Take 1,000 mg by mouth daily      azelastine (OPTIVAR) 0 05 % ophthalmic solution Administer 1 drop to both eyes 2 (two) times a day 6 mL 12    azelastine (OPTIVAR) 0 05 % ophthalmic solution Administer 1 drop to both eyes 2 (two) times a day 6 mL 12    butalbital-acetaminophen-caffeine (FIORICET,ESGIC) -40 mg per tablet TAKE ONE TABLET BY MOUTH EVERY SIX HOURS AS NEEDED FOR HEADACHES 30 tablet 0    Carafate 1 GM/10ML suspension Take 10 mL (1 g total) by mouth 4 (four) times a day 3600 mL 1    cephalexin (KEFLEX) 250 mg capsule        cetirizine (ZyrTEC) 10 mg tablet Take 1 tablet (10 mg total) by mouth daily for 365 doses 30 tablet 11    chlordiazepoxide-clidinium (LIBRAX) 5-2 5 mg per capsule Take 1 capsule by mouth 2 (two) times a day before meals 60 capsule 5    chlorhexidine (PERIDEX) 0 12 % solution       Cholecalciferol 25 MCG (1000 UT) tablet Take 1,000 Units by mouth daily      clotrimazole-betamethasone (LOTRISONE) 1-0 05 % cream       diltiazem (CARDIZEM) 120 MG tablet Take 120 mg by mouth if needed (For heart palpitations)      ELDERBERRY PO Take by mouth daily        Evolocumab 140 MG/ML SOAJ Inject 1 mL (140 mg total) under the skin every 14 (fourteen) days 2 mL 5    fluticasone-vilanterol (BREO ELLIPTA) 200-25 MCG/INH inhaler Inhale 1 puff daily Rinse mouth after use   3 Inhaler 3    furosemide (LASIX) 40 mg tablet Take 1 tablet (40 mg total) by mouth daily 90 tablet 3    Ginger, Zingiber officinalis, (GINGER PO) Take 1 tablet by mouth in the morning      hydrocortisone-pramoxine (PROCTOFOAM-HC) 1-1 % FOAM rectal foam Insert 1 applicator into the rectum 2 (two) times a day 10 g 2    ipratropium (ATROVENT) 0 06 % nasal spray       ketoconazole (NIZORAL) 2 % cream Apply topically daily 60 g 2    LORazepam (ATIVAN) 0 5 mg tablet Take 1 tablet (0 5 mg total) by mouth every 8 (eight) hours as needed for anxiety 90 tablet 0    Meth-Hyo-M Bl-Na Phos-Ph Sal (Uribel) 118 MG CAPS Take 1 each by mouth Three times a day      metoprolol succinate (TOPROL-XL) 100 mg 24 hr tablet Take 1 tablet (100 mg total) by mouth 2 (two) times a day 180 tablet 2    montelukast (SINGULAIR) 10 mg tablet TAKE ONE TABLET BY MOUTH AT BEDTIME  90 tablet 0    MULTIPLE VITAMIN PO Take by mouth  nystatin (MYCOSTATIN) powder       omeprazole (PriLOSEC) 20 mg delayed release capsule Take 1 capsule (20 mg total) by mouth daily 90 capsule 1    ondansetron (Zofran ODT) 4 mg disintegrating tablet Take 1 tablet (4 mg total) by mouth every 6 (six) hours as needed for nausea or vomiting 20 tablet 0    oxyCODONE (Roxicodone) 5 immediate release tablet Take 1 tablet (5 mg total) by mouth every 4 (four) hours as needed for moderate pain for up to 10 days Max Daily Amount: 30 mg 10 tablet 0    phenazopyridine (PYRIDIUM) 200 mg tablet Take 1 tablet (200 mg total) by mouth 3 (three) times a day with meals 10 tablet 0    pimecrolimus (ELIDEL) 1 % cream Apply topically to rash on eyelids and hands once or twice a daily  100 g 3    Probiotic Product (PROBIOTIC-10 PO) Take 1 tablet by mouth in the morning      Rimegepant Sulfate (NURTEC) 75 MG TBDP Take 75 mg by mouth daily as needed (PRN for migrains) 30 tablet 1    spironolactone (ALDACTONE) 25 mg tablet TAKE ONE TABLET BY MOUTH TWICE DAILY 180 tablet 0    sucralfate (CARAFATE) 1 g tablet Take 1 tablet (1 g total) by mouth 4 (four) times a day 360 tablet 1    SUMAtriptan (IMITREX) 100 mg tablet Take 100 mg by mouth once as needed      traMADol (ULTRAM) 50 mg tablet Take 1 tablet (50 mg total) by mouth every 8 (eight) hours as needed for severe pain 60 tablet 0    triamcinolone (KENALOG) 0 1 % cream Apply topically 2 (two) times a day 30 g 1    Turmeric 500 MG CAPS       Ubrogepant (UBRELVY) 50 MG tablet Take 1 tablet (50 mg) one time as needed for migraine  May repeat one additional tablet (50 mg) at least two hours after the first dose  Do not use more than two doses per day, or for more than eight days per month   10 tablet 2    Zinc 100 MG TABS Take by mouth daily      fexofenadine (ALLEGRA) 180 MG tablet Take 1 tablet (180 mg total) by mouth daily IN AM 30 tablet 12     Current Facility-Administered Medications   Medication Dose Route Frequency Provider Last Rate Last Admin    cyanocobalamin injection 1,000 mcg  1,000 mcg Intramuscular Q30 Days Elisha Gays, DO   1,000 mcg at 03/17/22 1435       Allergies  Allergies   Allergen Reactions    Ace Inhibitors Angioedema and Anaphylaxis     Anaphylaxis    Hydralazine Other (See Comments)     Lip swelling  Flank pain    Other Other (See Comments) and Anaphylaxis     Other reaction(s): angioadema  Other reaction(s): Other (See Comments)  Preservatives- itching throat closes, hi  Other reaction(s): angioadema  E Z Cat - hives throat closes itching,  Preservatives- itching throat closes, hi    Valsartan Angioedema     Lips, face swollen    Ampicillin Hives     Depends on brand some preservatives can react    Benicar [Olmesartan] Angioedema     See Allergy note from 9/11/2008  Swollen ankles/legs    Sulfa Antibiotics Other (See Comments)     stuffiness,itching,hives,throat closing         The following portions of the patient's history were reviewed and updated as appropriate: allergies, current medications, past medical history, past social history, past surgical history and problem list       Vitals  Vitals:    05/09/22 1028   BP: 120/90   BP Location: Left arm   Patient Position: Sitting   Cuff Size: Adult   Temp: 97 8 °F (36 6 °C)   TempSrc: Tympanic   Weight: 95 3 kg (210 lb)   Height: 5' 4" (1 626 m)       Physical Exam  Constitutional   General appearance: Patient is seated and in no acute distress, well appearing and well nourished  Head and Face   Head and face: Normal     Eyes   Conjunctiva and lids: No erythema, swelling or discharge  Anicteric  Ears, Nose, Mouth, and Throat   Hearing: Normal     Neck: Supple, trachea midline  Pulmonary    Lungs: Clear to ascultation, no wheezes, rhonchi, or rales  Cardiovascular   Examination of extremities for edema and/or varicosities: Normal     Abdomen   Abdomen: Soft, no masses, no organomegaly  Mild LLQ pain  Normal bowel sounds     Musculoskeletal Gait and station: Normal     Skin   Skin and subcutaneous tissue: Warm, dry, and intact  No visible jaundice, lesions or rashes  Psychiatric   Judgment and insight: Normal  Recent and remote memory:  Normal  Mood and affect: Normal      Results  No visits with results within 1 Day(s) from this visit     Latest known visit with results is:   Admission on 05/03/2022, Discharged on 05/04/2022   Component Date Value    WBC 05/03/2022 8 09     RBC 05/03/2022 4 14     Hemoglobin 05/03/2022 12 8     Hematocrit 05/03/2022 37 8     MCV 05/03/2022 91     MCH 05/03/2022 30 9     MCHC 05/03/2022 33 9     RDW 05/03/2022 12 1     MPV 05/03/2022 9 3     Platelets 36/32/9858 260     nRBC 05/03/2022 0     Neutrophils Relative 05/03/2022 85*    Immat GRANS % 05/03/2022 1     Lymphocytes Relative 05/03/2022 10*    Monocytes Relative 05/03/2022 4     Eosinophils Relative 05/03/2022 0     Basophils Relative 05/03/2022 0     Neutrophils Absolute 05/03/2022 6 88     Immature Grans Absolute 05/03/2022 0 04     Lymphocytes Absolute 05/03/2022 0 82     Monocytes Absolute 05/03/2022 0 33     Eosinophils Absolute 05/03/2022 0 00     Basophils Absolute 05/03/2022 0 02     Sodium 05/03/2022 136     Potassium 05/03/2022 4 8     Chloride 05/03/2022 99*    CO2 05/03/2022 28     ANION GAP 05/03/2022 9     BUN 05/03/2022 20     Creatinine 05/03/2022 0 89     Glucose 05/03/2022 213*    Calcium 05/03/2022 9 8     AST 05/03/2022 38     ALT 05/03/2022 35     Alkaline Phosphatase 05/03/2022 117*    Total Protein 05/03/2022 7 5     Albumin 05/03/2022 4 2     Total Bilirubin 05/03/2022 0 90     eGFR 05/03/2022 68     hs TnI 0hr 05/03/2022 5     NT-proBNP 05/03/2022 258*    SARS-CoV-2 05/03/2022 Negative     INFLUENZA A PCR 05/03/2022 Negative     INFLUENZA B PCR 05/03/2022 Negative     RSV PCR 05/03/2022 Negative     hs TnI 2hr 05/04/2022 5     Delta 2hr hsTnI 05/04/2022 0     Ventricular Rate 05/03/2022 72  Atrial Rate 05/03/2022 72     NJ Interval 05/03/2022 166     QRSD Interval 05/03/2022 94     QT Interval 05/03/2022 396     QTC Interval 05/03/2022 433     P Axis 05/03/2022 54     QRS Axis 05/03/2022 -23     T Wave Axis 05/03/2022 53     Ventricular Rate 05/04/2022 65     Atrial Rate 05/04/2022 65     NJ Interval 05/04/2022 170     QRSD Interval 05/04/2022 88     QT Interval 05/04/2022 398     QTC Interval 05/04/2022 413     P Axis 05/04/2022 22     QRS Axis 05/04/2022 -16     T Wave Axis 05/04/2022 -59        Radiology Results  CTA ED chest PE study    Result Date: 5/4/2022  Narrative: CTA - CHEST WITH IV CONTRAST - PULMONARY ANGIOGRAM INDICATION:   hemoptysis  COMPARISON: CT abdomen pelvis dated 4/13/2020 TECHNIQUE: CTA examination of the chest was performed using angiographic technique according to a protocol specifically tailored to evaluate for pulmonary embolism  Axial, sagittal, and coronal 2D reformatted images were created from the source data and  submitted for interpretation  In addition, coronal 3D MIP postprocessing was performed on the acquisition scanner  Radiation dose length product (DLP) for this visit:  495 mGy-cm   This examination, like all CT scans performed in the [facility], was performed utilizing techniques to minimize radiation dose exposure, including the use of iterative reconstruction and automated exposure control  IV Contrast:  85 mL of iohexol (OMNIPAQUE)  FINDINGS: PULMONARY ARTERIAL TREE:  No pulmonary embolus is seen  LUNGS:  Linear atelectasis scattered in the lungs bilaterally  Lungs otherwise appear grossly clear  PLEURA:  Unremarkable  HEART/GREAT VESSELS:  Cardiomegaly  Mild coronary atherosclerosis  Aortic atherosclerosis  No thoracic aortic aneurysm  MEDIASTINUM AND ROSALES:  Unremarkable  CHEST WALL AND LOWER NECK:   Unremarkable  VISUALIZED STRUCTURES IN THE UPPER ABDOMEN:  Fatty infiltration of the liver    Stent in the proximal SMA appears patent  Small amount of intraperitoneal free air, probably correlates with the patient's recent surgery OSSEOUS STRUCTURES:  No acute fracture or destructive osseous lesion  Impression: No pulmonary embolism or acute pulmonary process is seen  Cardiomegaly, mild coronary atherosclerosis  Fatty infiltration of the liver is suspected  In the setting of abdominal pain and/or elevated liver function tests, consider steatohepatitis  Small amount of intraperitoneal free air, probably correlates with the patient's recent surgery Other findings as above  Workstation performed: [id number]     CT abdomen pelvis w contrast    Result Date: 4/19/2022  Narrative: CT ABDOMEN AND PELVIS WITH IV CONTRAST INDICATION:   R10 31: Right lower quadrant pain  COMPARISON:  November 29, 2021 TECHNIQUE:  CT examination of the abdomen and pelvis was performed  Axial, sagittal, and coronal 2D reformatted images were created from the source data and submitted for interpretation  Radiation dose length product (DLP) for this visit:  853 mGy-cm   This examination, like all CT scans performed in the [facility], was performed utilizing techniques to minimize radiation dose exposure, including the use of iterative reconstruction and automated exposure control  IV Contrast:  100 mL of iohexol (OMNIPAQUE) Enteric Contrast:  Enteric contrast was not administered  FINDINGS: ABDOMEN LOWER CHEST:  No clinically significant abnormality identified in the visualized lower chest  LIVER/BILIARY TREE:  Unremarkable  GALLBLADDER:  No calcified gallstones  No pericholecystic inflammatory change  SPLEEN:  Unremarkable  PANCREAS:  Unremarkable  ADRENAL GLANDS:  Unremarkable  KIDNEYS/URETERS:  Unremarkable  No hydronephrosis  STOMACH AND BOWEL:  There is colonic diverticulosis without evidence of acute diverticulitis  APPENDIX:  The appendix is identified just left of the midline    Although its overall thickness is abnormal, measuring 11 mm, there is no distention of the lumen, appendicolith or periappendiceal inflammation  Correlate with clinical findings and location of patient pain complaint  ABDOMINOPELVIC CAVITY:  No ascites  No pneumoperitoneum  No lymphadenopathy  VESSELS:  Unremarkable for patient's age  PELVIS REPRODUCTIVE ORGANS:  Unremarkable for patient's age  URINARY BLADDER:  Unremarkable  ABDOMINAL WALL/INGUINAL REGIONS:  Unremarkable  OSSEOUS STRUCTURES:  No acute fracture or destructive osseous lesion  Impression: Abnormally thickened appendiceal caliber, without distention, appendicolith or periappendiceal inflammation  Correlate with abnormality  Remainder of the exam is within normal limits, without other acute abnormalities identified  The study was marked in [facility] for immediate notification  Workstation performed: [id number]       Orders  No orders of the defined types were placed in this encounter

## 2022-05-17 DIAGNOSIS — K25.3 ACUTE GASTRIC ULCER WITHOUT HEMORRHAGE OR PERFORATION: Primary | ICD-10-CM

## 2022-05-17 DIAGNOSIS — R53.82 CHRONIC FATIGUE: ICD-10-CM

## 2022-05-17 DIAGNOSIS — I11.0 HYPERTENSIVE HEART DISEASE WITH CONGESTIVE HEART FAILURE, UNSPECIFIED HEART FAILURE TYPE (HCC): ICD-10-CM

## 2022-05-17 DIAGNOSIS — E78.49 FAMILIAL HYPERLIPIDEMIA: ICD-10-CM

## 2022-05-17 DIAGNOSIS — I10 BENIGN ESSENTIAL HYPERTENSION: ICD-10-CM

## 2022-05-17 DIAGNOSIS — R79.89 LOW TSH LEVEL: ICD-10-CM

## 2022-05-17 DIAGNOSIS — E53.8 VITAMIN B12 DEFICIENCY: ICD-10-CM

## 2022-05-17 DIAGNOSIS — R76.8 ELEVATED ANTINUCLEAR ANTIBODY (ANA) LEVEL: ICD-10-CM

## 2022-05-19 DIAGNOSIS — N30.10 INTERSTITIAL CYSTITIS: ICD-10-CM

## 2022-05-19 RX ORDER — PHENAZOPYRIDINE HYDROCHLORIDE 200 MG/1
200 TABLET, FILM COATED ORAL
Qty: 10 TABLET | Refills: 0 | Status: SHIPPED | OUTPATIENT
Start: 2022-05-19 | End: 2022-06-03 | Stop reason: SDUPTHER

## 2022-05-20 ENCOUNTER — OFFICE VISIT (OUTPATIENT)
Dept: SURGERY | Facility: CLINIC | Age: 62
End: 2022-05-20

## 2022-05-20 VITALS — BODY MASS INDEX: 35.27 KG/M2 | HEIGHT: 64 IN | TEMPERATURE: 97.8 F | WEIGHT: 206.6 LBS

## 2022-05-20 DIAGNOSIS — K38.9 APPENDIX DISEASE: Primary | ICD-10-CM

## 2022-05-20 PROCEDURE — 99024 POSTOP FOLLOW-UP VISIT: CPT | Performed by: SURGERY

## 2022-05-20 NOTE — ASSESSMENT & PLAN NOTE
68-year-old female status post laparoscopic appendectomy on 05/03/2022, here for follow-up  Plan:  - The patient's pathology was reviewed, and understanding was acknowledged  - The patient may return to all normal activities at this time  - I reiterated the lifting restriction of 20 lb until 4 weeks postoperatively, and advised that there are no restrictions from a dietary perspective   - The patient may return to clinic as needed, and can call with any questions should they arise

## 2022-05-20 NOTE — PROGRESS NOTES
Office Visit - General Surgery  King Owens MRN: [id number]  Encounter: [id number]    Assessment and Plan  Problem List Items Addressed This Visit        Digestive    Appendix disease - Primary     68-year-old female status post laparoscopic appendectomy on 05/03/2022, here for follow-up  Plan:  - The patient's pathology was reviewed, and understanding was acknowledged  - The patient may return to all normal activities at this time  - I reiterated the lifting restriction of 20 lb until 4 weeks postoperatively, and advised that there are no restrictions from a dietary perspective   - The patient may return to clinic as needed, and can call with any questions should they arise  Chief Complaint:  King Owens is a 57 y o  female who presents for Post-op (P/o appendectomy)    Subjective  68-year-old female status post laparoscopic appendectomy, here for follow-up  In the office today she denies any significant complaints  Unfortunately immediately postoperatively she developed some some bloody sputum that required her to return to the hospital   Her workup was negative and this was likely difficult to trauma from intubation  She showed me pictures of some bruising to the back of her throat  Currently she denies any abdominal pain, nausea, vomiting, fevers, or chills  She is able to tolerate a regular diet and is moving her bowels normally  She has returned to most activities without difficulty  We reviewed her pathology today that just showed an appendix with a focal serrated change, with negative margins      Past Medical History:   Diagnosis Date    A-fib [facility]) 03/2020    Allergic     Anxiety     Arthritis     Asthma     Colon polyp     Depression     GERD (gastroesophageal reflux disease)     Heart murmur     Hives     Hyperlipidemia     Hypertension     Infertility, female     Migraine     Palpitations     Psychiatric disorder     Wears glasses        Past Surgical History:   Procedure Laterality Date    COLONOSCOPY      EGD      EXPLORATORY LAPAROTOMY      for infertility    HAND SURGERY      Hand excision of tendon cyst    NV LAP,APPENDECTOMY N/A 5/3/2022    Procedure: APPENDECTOMY LAPAROSCOPIC;  Surgeon:  Jacky Romano MD;  Location: [facility];  Service: General    SUPERIOR MESTENTERIC ARTERY STENT      WISDOM TOOTH EXTRACTION         Family History   Problem Relation Age of Onset    Lung cancer Mother     Brain cancer Mother     Diabetes Father     Depression Father     Other Father         septic     Allergies Sister     Hashimoto's thyroiditis Sister     Abdominal aortic aneurysm Sister     Diabetes Sister     No Known Problems Sister     Hodgkin's lymphoma Brother     No Known Problems Brother     Diabetes Other        Social History     Tobacco Use    Smoking status: Former Smoker     Packs/day: 0 50     Years: 10 00     Pack years: 5 00     Quit date: 2019     Years since quitting: 3 3    Smokeless tobacco: Never Used   Vaping Use    Vaping Use: Never used   Substance Use Topics    Alcohol use: No    Drug use: No        Medications  Current Outpatient Medications on File Prior to Visit   Medication Sig Dispense Refill    acetaminophen (TYLENOL) 325 mg tablet Take 650 mg by mouth as needed for mild pain       apixaban (Eliquis) 5 mg Take 1 tablet (5 mg total) by mouth 2 (two) times a day 180 tablet 5    ascorbic acid (VITAMIN C) 1000 MG tablet Take 1,000 mg by mouth daily      azelastine (OPTIVAR) 0 05 % ophthalmic solution Administer 1 drop to both eyes 2 (two) times a day 6 mL 12    azelastine (OPTIVAR) 0 05 % ophthalmic solution Administer 1 drop to both eyes 2 (two) times a day 6 mL 12    butalbital-acetaminophen-caffeine (FIORICET,ESGIC) -40 mg per tablet TAKE ONE TABLET BY MOUTH EVERY SIX HOURS AS NEEDED FOR HEADACHES 30 tablet 0    Carafate 1 GM/10ML suspension Take 10 mL (1 g total) by mouth 4 (four) times a day 3600 mL 1  cephalexin (KEFLEX) 250 mg capsule        cetirizine (ZyrTEC) 10 mg tablet Take 1 tablet (10 mg total) by mouth daily for 365 doses 30 tablet 11    chlordiazepoxide-clidinium (LIBRAX) 5-2 5 mg per capsule Take 1 capsule by mouth 2 (two) times a day before meals 60 capsule 5    chlorhexidine (PERIDEX) 0 12 % solution       Cholecalciferol 25 MCG (1000 UT) tablet Take 1,000 Units by mouth daily      clotrimazole-betamethasone (LOTRISONE) 1-0 05 % cream       diltiazem (CARDIZEM) 120 MG tablet Take 120 mg by mouth if needed (For heart palpitations)      ELDERBERRY PO Take by mouth daily        Evolocumab 140 MG/ML SOAJ Inject 1 mL (140 mg total) under the skin every 14 (fourteen) days 2 mL 5    fluticasone-vilanterol (BREO ELLIPTA) 200-25 MCG/INH inhaler Inhale 1 puff daily Rinse mouth after use   3 Inhaler 3    furosemide (LASIX) 40 mg tablet Take 1 tablet (40 mg total) by mouth daily 90 tablet 3    Ginger, Zingiber officinalis, (GINGER PO) Take 1 tablet by mouth in the morning      hydrocortisone-pramoxine (PROCTOFOAM-HC) 1-1 % FOAM rectal foam Insert 1 applicator into the rectum 2 (two) times a day 10 g 2    ipratropium (ATROVENT) 0 06 % nasal spray       ketoconazole (NIZORAL) 2 % cream Apply topically daily 60 g 2    LORazepam (ATIVAN) 0 5 mg tablet Take 1 tablet (0 5 mg total) by mouth every 8 (eight) hours as needed for anxiety 90 tablet 0    Meth-Hyo-M Bl-Na Phos-Ph Sal (Uribel) 118 MG CAPS Take 1 each by mouth Three times a day      metoprolol succinate (TOPROL-XL) 100 mg 24 hr tablet Take 1 tablet (100 mg total) by mouth 2 (two) times a day 180 tablet 2    montelukast (SINGULAIR) 10 mg tablet TAKE ONE TABLET BY MOUTH AT BEDTIME  90 tablet 0    MULTIPLE VITAMIN PO Take by mouth      nystatin (MYCOSTATIN) powder       omeprazole (PriLOSEC) 40 MG capsule Take 1 capsule (40 mg total) by mouth daily 309 capsule 4    ondansetron (Zofran ODT) 4 mg disintegrating tablet Take 1 tablet (4 mg total) by mouth every 6 (six) hours as needed for nausea or vomiting 20 tablet 0    phenazopyridine (PYRIDIUM) 200 mg tablet Take 1 tablet (200 mg total) by mouth in the morning and 1 tablet (200 mg total) at noon and 1 tablet (200 mg total) in the evening  Take with meals  10 tablet 0    pimecrolimus (ELIDEL) 1 % cream Apply topically to rash on eyelids and hands once or twice a daily  100 g 3    Probiotic Product (PROBIOTIC-10 PO) Take 1 tablet by mouth in the morning      Rimegepant Sulfate (NURTEC) 75 MG TBDP Take 75 mg by mouth daily as needed (PRN for migrains) 30 tablet 1    spironolactone (ALDACTONE) 25 mg tablet TAKE ONE TABLET BY MOUTH TWICE DAILY 180 tablet 0    sucralfate (CARAFATE) 1 g tablet Take 1 tablet (1 g total) by mouth 4 (four) times a day 360 tablet 1    SUMAtriptan (IMITREX) 100 mg tablet Take 100 mg by mouth once as needed      traMADol (ULTRAM) 50 mg tablet Take 1 tablet (50 mg total) by mouth every 8 (eight) hours as needed for severe pain 60 tablet 0    triamcinolone (KENALOG) 0 1 % cream Apply topically 2 (two) times a day 30 g 1    Turmeric 500 MG CAPS       Ubrogepant (UBRELVY) 50 MG tablet Take 1 tablet (50 mg) one time as needed for migraine  May repeat one additional tablet (50 mg) at least two hours after the first dose  Do not use more than two doses per day, or for more than eight days per month   10 tablet 2    Zinc 100 MG TABS Take by mouth daily      fexofenadine (ALLEGRA) 180 MG tablet Take 1 tablet (180 mg total) by mouth daily IN AM 30 tablet 12     Current Facility-Administered Medications on File Prior to Visit   Medication Dose Route Frequency Provider Last Rate Last Admin    cyanocobalamin injection 1,000 mcg  1,000 mcg Intramuscular Q30 Days Jagdish Monsalve DO   1,000 mcg at 03/17/22 1435       Allergies  Allergies   Allergen Reactions    Ace Inhibitors Angioedema and Anaphylaxis     Anaphylaxis    Hydralazine Other (See Comments)     Lip swelling  Flank pain    Other Other (See Comments) and Anaphylaxis     Other reaction(s): angioadema  Other reaction(s): Other (See Comments)  Preservatives- itching throat closes, hi  Other reaction(s): angioadema  E Z Cat - hives throat closes itching,  Preservatives- itching throat closes, hi    Valsartan Angioedema     Lips, face swollen    Ampicillin Hives     Depends on brand some preservatives can react    Benicar [Olmesartan] Angioedema     See Allergy note from 9/11/2008  Swollen ankles/legs    Sulfa Antibiotics Other (See Comments)     stuffiness,itching,hives,throat closing       Review of Systems   Constitutional: Negative for chills, fatigue and fever  HENT: Negative for ear pain, facial swelling, sinus pressure and sinus pain  Eyes: Negative for pain  Respiratory: Negative for cough, shortness of breath and wheezing  Cardiovascular: Negative for chest pain  Gastrointestinal: Negative for abdominal pain, constipation, diarrhea, nausea and vomiting  Endocrine: Negative for cold intolerance and heat intolerance  Genitourinary: Negative for dysuria and flank pain  Musculoskeletal: Negative for back pain and neck pain  Skin: Negative for wound  Neurological: Negative for syncope, facial asymmetry, light-headedness and numbness  Psychiatric/Behavioral: Negative for behavioral problems, confusion and suicidal ideas  Objective  Vitals:    05/20/22 1130   Temp: 97 8 °F (36 6 °C)       Physical Exam  Vitals and nursing note reviewed  Constitutional:       General: She is not in acute distress  Appearance: Normal appearance  She is not toxic-appearing  HENT:      Head: Normocephalic and atraumatic  Mouth/Throat:      Mouth: Mucous membranes are moist    Eyes:      Extraocular Movements: Extraocular movements intact  Pupils: Pupils are equal, round, and reactive to light  Cardiovascular:      Rate and Rhythm: Normal rate and regular rhythm  Pulses: Normal pulses  Pulmonary:      Effort: Pulmonary effort is normal  No respiratory distress  Breath sounds: Normal breath sounds  No wheezing  Abdominal:      General: There is no distension  Palpations: Abdomen is soft  There is no mass  Tenderness: There is no abdominal tenderness  There is no guarding or rebound  Hernia: No hernia is present  Comments: Well-healed laparoscopic port sites, no evidence of hernia  Musculoskeletal:         General: No swelling or deformity  Normal range of motion  Cervical back: Normal range of motion and neck supple  Right lower leg: No edema  Left lower leg: No edema  Skin:     General: Skin is warm and dry  Coloration: Skin is not jaundiced  Neurological:      General: No focal deficit present  Mental Status: She is alert and oriented to person, place, and time     Psychiatric:         Mood and Affect: Mood normal          Behavior: Behavior normal

## 2022-05-23 DIAGNOSIS — I50.30 DIASTOLIC CONGESTIVE HEART FAILURE, UNSPECIFIED HF CHRONICITY (HCC): Primary | ICD-10-CM

## 2022-05-26 DIAGNOSIS — R22.9 SUBCUTANEOUS NODULE: ICD-10-CM

## 2022-05-26 DIAGNOSIS — M79.602 LEFT ARM PAIN: ICD-10-CM

## 2022-05-26 RX ORDER — TRAMADOL HYDROCHLORIDE 50 MG/1
50 TABLET ORAL EVERY 8 HOURS PRN
Qty: 60 TABLET | Refills: 0 | Status: SHIPPED | OUTPATIENT
Start: 2022-05-26 | End: 2022-06-08 | Stop reason: SDUPTHER

## 2022-05-26 NOTE — TELEPHONE ENCOUNTER
Patients GI provider:  Dr Dixon Narrow    Number to return call: ([phone])    Reason for call: Pt calling asking for a call to discuss carafate  Asking if she can take two, stomach bothering her  Please call  Thank you!     Scheduled procedure/appointment date if applicable: Apt/procedure 8/9/22 with Kate Maldonado

## 2022-05-27 DIAGNOSIS — L90.0 LICHEN SCLEROSUS: ICD-10-CM

## 2022-05-27 RX ORDER — ONDANSETRON 4 MG/1
4 TABLET, ORALLY DISINTEGRATING ORAL EVERY 6 HOURS PRN
Qty: 60 TABLET | Refills: 2 | Status: SHIPPED | OUTPATIENT
Start: 2022-05-27 | End: 2022-07-30 | Stop reason: SDUPTHER

## 2022-05-27 NOTE — TELEPHONE ENCOUNTER
Thurs 5/26/22 1:05 PM  Pt LM requesting a refill for the triamcinolone 0 1% cream for the legs  Requesting it to be sent to [facility]

## 2022-05-27 NOTE — TELEPHONE ENCOUNTER
OV 5/9/22 Magy King   Hx: Appendectomy, GERD, PUD, Mesenteric Artery Thrombosis     FYI   Pt complaining of worsening GERD symptoms  Reports epigastric burning sensation, belching, daily nausea, occasional acid reflux, LUQ area tender to touch, and epigastric cramping/pressure sensation  Denies abdominal pain aside from post op appendectomy on 5/3, emesis, fevers, NSAID use, or any other appreciating symptoms  Due to worsening issues pt stopped omeprazole and restarted dexilant two days ago as well as taking zofran and Carafate  Reports some improvement with symptoms  Recs:  1  Continue dexilant, start OTC nightly pepcid (pt has 20mg tablets at home), continue carafate with meal time, and zofran (please send refill)   2  Reviewed GERD diet and trial gasx for pressure/bloating sensation      Advised to call office should symptoms worsen or has no improvement after 1 week

## 2022-05-31 DIAGNOSIS — R21 RASH: Primary | ICD-10-CM

## 2022-05-31 RX ORDER — TRIAMCINOLONE ACETONIDE 1 MG/G
CREAM TOPICAL 2 TIMES DAILY
Qty: 30 G | Refills: 1 | OUTPATIENT
Start: 2022-05-31

## 2022-05-31 RX ORDER — TRIAMCINOLONE ACETONIDE 1 MG/G
CREAM TOPICAL 2 TIMES DAILY
Qty: 453.6 G | Refills: 0 | Status: SHIPPED | OUTPATIENT
Start: 2022-05-31

## 2022-05-31 NOTE — TELEPHONE ENCOUNTER
----- Message from [employer] sent at 5/31/2022 10:37 AM EDT -----  Regarding: Triamincinolone cream   Hello I saw a doctor on February 6th or 8th I believe it was and at that time I was getting a second opinion to change from the doctor, Dr Adriel Scott told me to keep on using the triminicinolone cream for my legs at that time and asked me if I needed a refill, well at that time I did not need a refill but I do now  So could you please refill it for me  I believe I see him next week for our checkup, but I can't wait that long, my legs are non-stop itching  Best at [facility]   Please call me if there's any questions my phone number is [phone] I did call last week for refill and nobody called me back  Thank you very much    [employer]

## 2022-06-01 ENCOUNTER — EVALUATION (OUTPATIENT)
Dept: PHYSICAL THERAPY | Facility: MEDICAL CENTER | Age: 62
End: 2022-06-01
Payer: COMMERCIAL

## 2022-06-01 DIAGNOSIS — M54.41 CHRONIC RIGHT-SIDED LOW BACK PAIN WITH RIGHT-SIDED SCIATICA: Primary | ICD-10-CM

## 2022-06-01 DIAGNOSIS — G89.29 CHRONIC RIGHT-SIDED LOW BACK PAIN WITH RIGHT-SIDED SCIATICA: Primary | ICD-10-CM

## 2022-06-01 PROCEDURE — 97110 THERAPEUTIC EXERCISES: CPT | Performed by: PHYSICAL THERAPIST

## 2022-06-01 PROCEDURE — 97161 PT EVAL LOW COMPLEX 20 MIN: CPT | Performed by: PHYSICAL THERAPIST

## 2022-06-01 NOTE — PROGRESS NOTES
PT Evaluation     Today's date: 2022  Patient name: Yong Parson  : 1960  MRN: [id number]  Referring provider: Delray Dubin, DO  Dx:   Encounter Diagnosis   Name Primary?  Chronic right-sided low back pain with right-sided sciatica Yes                  Assessment  Assessment details: Yong Parson is a 55 y/o female who presents with complaints of chronic low back pain  The patient's greatest concerns are not being able to perform daily activities d/t back pain  Primary movement impairment diagnosis of lumbar hypomobility, resulting in pathoanatomical symptoms of chronic low back pain with sciatica of the right side, which limits her ability to perform functional activities without pain  Pt  will benefit from skilled PT services that includes manual therapy techniques to enhance tissue extensibility, neuromuscular re-education to facilitate motor control, therapeutic exercise to increase functional mobility, and modalities prn to reduce pain and inflammation  Impairments: abnormal muscle firing, abnormal or restricted ROM, abnormal movement, activity intolerance, impaired physical strength, lacks appropriate home exercise program, pain with function, poor posture  and poor body mechanics  Barriers to therapy: Inactivity; BMI; chronicity of pain  Understanding of Dx/Px/POC: good   Prognosis: good    Goals  Short Term Goals: to be achieved by 4 weeks  1) Patient to be independent with basic HEP  2) Decrease pain to 3/10 at its worst   3) [facility] in all deficient planes  4) Increase hip strength by 1/2 MMT grade in all deficient planes  5) Patient will be able to walk   25 miles without pain in 2-4 weeks  Long Term Goals: to be achieved by discharge  1) FOTO equal to or greater than 58   2) Patient to be independent with comprehensive HEP  3) Increase LE strength to 5/5 MMT grade in all planes to improve a/iadls  4) Patient will be able to ambulate   5 miles without low back pain at time of discharge  Plan  Patient would benefit from: PT eval  Planned modality interventions: thermotherapy: hydrocollator packs  Planned therapy interventions: joint mobilization, manual therapy, neuromuscular re-education, patient education, postural training, strengthening, stretching, therapeutic exercise, home exercise program, graded exercise, flexibility, body mechanics training and abdominal trunk stabilization  Frequency: 2x week  Duration in weeks: 6  Treatment plan discussed with: patient      Subjective Evaluation    History of Present Illness  Mechanism of injury: Patient reports that she has been experiencing back pain for years  She started physical therapy in 2020 for the same issue, but was unable to complete the plan of care and has not been able to follow through with HEP  She states that she has gained weight since the pandemic began  Patient reports occasional tingling/numbness in the R LE from her buttocks to the calf region  She notes bowel and bladder changes d/t interstitial cystitis and diarrhea that her PCP is aware of and further testing is being performed  Patient reports that bending over, negotiating stairs, and walking is bothersome  She has SOB and back pain when vacuuming  She would like to lose weight and return to her PLOF  Pain  Current pain ratin  At best pain ratin  At worst pain ratin  Quality: sharp, radiating and tight      Diagnostic Tests  Abnormal x-ray: L4-L5, L5-S1 mild posterior facet degenerative sclerosis  Treatments  Current treatment: physical therapy  Patient Goals  Patient goals for therapy: increased strength, independence with ADLs/IADLs, improved balance, increased motion and decreased pain  Patient goal: Patient would like to be able to walk, sit, work, and lift without difficulty  Patient would like to be able to walk at least a half mile          Objective     Concurrent Complaints  Positive for bladder dysfunction (hx of interstitial cystitis), bowel dysfunction (diarrhea) and ulcer problem  Negative for night pain, disturbed sleep, saddle (S4) numbness, cardiac problem, kidney problem, gallbladder problem, stomach problem, appendix problem, spleen problem, pancreas problem, history of cancer, history of trauma and infection    Postural Observations  Seated posture: poor  Standing posture: poor        Tenderness     Lumbar Spine  Tenderness in the spinous process (L4-5)       Neurological Testing     Sensation     Lumbar   Left   Diminished: light touch    Right   Intact: light touch    Comments   Left light touch: LT 40-60% of R LE t/o L4-L5 dermatomes    Reflexes   Left   Patellar (L4): normal (2+)  Achilles (S1): normal (2+)    Right   Patellar (L4): normal (2+)  Achilles (S1): normal (2+)    Active Range of Motion     Lumbar   Flexion:  Restriction level: minimal  Extension:  with pain Restriction level: moderate  Left lateral flexion:  Restriction level: minimal  Right lateral flexion:  with pain Restriction level: moderate  Left rotation:  Restriction level: minimal  Right rotation:  Restriction level: minimal    Passive Range of Motion     Additional Passive Range of Motion Details  PAROM:  Hypomobility c/ UPAs at L4-5 and L5-S1 bilaterally    Joint Play   Joints within functional limits: L1, L2 and L3     Hypomobile: L4, L5 and S1     Pain: L4, L5 and S1     Strength/Myotome Testing     Lumbar   Left   Heel walk: normal  Toe walk: normal    Right   Heel walk: normal  Toe walk: normal    Left Hip   Planes of Motion   Flexion: 5  Extension: 3-  Abduction: 3+  Adduction: 3+    Isolated Muscles   Gluteus angelica: 3-  Gluteus medius: 3+  Iliopsoas: 3+    Right Hip   Planes of Motion   Flexion: 5  Extension: 3-  Abduction: 3+  Adduction: 3+    Isolated Muscles   Gluteus maximums: 3-  Gluteus medius: 3+  Iliopsoas: 3+    Left Knee   Flexion: 5  Extension: 5    Right Knee   Flexion: 5  Extension: 5    Left Ankle/Foot   Dorsiflexion: 5  Plantar flexion: 5  Great toe extension: 5    Right Ankle/Foot   Dorsiflexion: 5  Plantar flexion: 5  Great toe extension: 5    Muscle Activation   Patient unable to activate left transverse abdominals, left multifidus, right transverse abdominals and right multifidus  Tests     Lumbar   Negative sacral spring   Left   Positive quadrant  Negative passive SLR  Right   Positive slump test    Negative passive SLR and quadrant  Left Hip   Negative EMANI  Right Hip   Negative EMANI  Functional Assessment      Squat    Left tibial anterior translation beyond toes and right tibial anterior translation beyond toes  Single Leg Stance   Left single leg stance time: 5s   Right single leg stance time: 2s unsteady      Posterior weight shift: poor    Depth of femur height: above 90 degrees    General Comments:      Lumbar Comments       Precautions:  HTN; A-fib    Manuals 6/1                                                                Neuro Re-Ed                                                    Ther Ex             Bike             LTR 20x5s            SKTC 10x 10s            Hip add             Hip abd             Sciatic n glides 10x            Hamstring str sit 3x30s            Piriformis str sit 3x30s                         Ther Activity                                       Gait Training                                       Modalities             LORY Sullivan, PT  6/1/2022,3:36 PM

## 2022-06-02 ENCOUNTER — TELEPHONE (OUTPATIENT)
Dept: DERMATOLOGY | Facility: CLINIC | Age: 62
End: 2022-06-02

## 2022-06-02 NOTE — TELEPHONE ENCOUNTER
Pt calling to confirm apt, but to also ask about which medication was to be used for under the breast flareup  Provided pt with the name of the medication Ketoconazole Cream and she will start to use that until apt

## 2022-06-03 ENCOUNTER — TELEPHONE (OUTPATIENT)
Dept: GASTROENTEROLOGY | Facility: CLINIC | Age: 62
End: 2022-06-03

## 2022-06-03 DIAGNOSIS — K21.9 GASTROESOPHAGEAL REFLUX DISEASE WITHOUT ESOPHAGITIS: Primary | ICD-10-CM

## 2022-06-03 NOTE — TELEPHONE ENCOUNTER
Patients GI provider:  CAM Magana    Number to return call: [phone]    Reason for call: Pt calling asking if she is able to  more dexilant samples  Pt states she was told to notify when she was running low  Pt is looking to pick them up on Thursday, if possible      Scheduled procedure/appointment date if applicable: Appt: 0/01/7862

## 2022-06-06 ENCOUNTER — APPOINTMENT (OUTPATIENT)
Dept: LAB | Facility: HOSPITAL | Age: 62
End: 2022-06-06
Payer: COMMERCIAL

## 2022-06-06 DIAGNOSIS — R53.82 CHRONIC FATIGUE: ICD-10-CM

## 2022-06-06 DIAGNOSIS — E78.49 FAMILIAL HYPERLIPIDEMIA: ICD-10-CM

## 2022-06-06 DIAGNOSIS — R76.8 ELEVATED ANTINUCLEAR ANTIBODY (ANA) LEVEL: ICD-10-CM

## 2022-06-06 DIAGNOSIS — I50.30 DIASTOLIC CONGESTIVE HEART FAILURE, UNSPECIFIED HF CHRONICITY (HCC): ICD-10-CM

## 2022-06-06 DIAGNOSIS — I10 BENIGN ESSENTIAL HYPERTENSION: ICD-10-CM

## 2022-06-06 DIAGNOSIS — R79.89 LOW TSH LEVEL: ICD-10-CM

## 2022-06-06 DIAGNOSIS — K25.3 ACUTE GASTRIC ULCER WITHOUT HEMORRHAGE OR PERFORATION: ICD-10-CM

## 2022-06-06 DIAGNOSIS — I11.0 HYPERTENSIVE HEART DISEASE WITH CONGESTIVE HEART FAILURE, UNSPECIFIED HEART FAILURE TYPE (HCC): ICD-10-CM

## 2022-06-06 DIAGNOSIS — E53.8 VITAMIN B12 DEFICIENCY: ICD-10-CM

## 2022-06-06 LAB
25(OH)D3 SERPL-MCNC: 43.3 NG/ML (ref 30–100)
ALBUMIN SERPL BCP-MCNC: 4.7 G/DL (ref 3.5–5)
ALP SERPL-CCNC: 133 U/L (ref 46–116)
ALT SERPL W P-5'-P-CCNC: 46 U/L (ref 12–78)
ANION GAP SERPL CALCULATED.3IONS-SCNC: 9 MMOL/L (ref 4–13)
AST SERPL W P-5'-P-CCNC: 29 U/L (ref 5–45)
BASOPHILS # BLD AUTO: 0.06 THOUSANDS/ΜL (ref 0–0.1)
BASOPHILS NFR BLD AUTO: 1 % (ref 0–1)
BILIRUB SERPL-MCNC: 0.82 MG/DL (ref 0.2–1)
BUN SERPL-MCNC: 15 MG/DL (ref 5–25)
CALCIUM SERPL-MCNC: 10 MG/DL (ref 8.3–10.1)
CHLORIDE SERPL-SCNC: 97 MMOL/L (ref 100–108)
CHOLEST SERPL-MCNC: 193 MG/DL
CO2 SERPL-SCNC: 33 MMOL/L (ref 21–32)
CREAT SERPL-MCNC: 1.18 MG/DL (ref 0.6–1.3)
CRP SERPL QL: 13.6 MG/L
EOSINOPHIL # BLD AUTO: 0.14 THOUSAND/ΜL (ref 0–0.61)
EOSINOPHIL NFR BLD AUTO: 2 % (ref 0–6)
ERYTHROCYTE [DISTWIDTH] IN BLOOD BY AUTOMATED COUNT: 12.1 % (ref 11.6–15.1)
FERRITIN SERPL-MCNC: 49 NG/ML (ref 8–388)
GFR SERPL CREATININE-BSD FRML MDRD: 48 ML/MIN/1.73SQ M
GLUCOSE P FAST SERPL-MCNC: 114 MG/DL (ref 65–99)
HCT VFR BLD AUTO: 42 % (ref 34.8–46.1)
HDLC SERPL-MCNC: 55 MG/DL
HGB BLD-MCNC: 14 G/DL (ref 11.5–15.4)
IMM GRANULOCYTES # BLD AUTO: 0.03 THOUSAND/UL (ref 0–0.2)
IMM GRANULOCYTES NFR BLD AUTO: 1 % (ref 0–2)
LDLC SERPL CALC-MCNC: 95 MG/DL (ref 0–100)
LYMPHOCYTES # BLD AUTO: 1.41 THOUSANDS/ΜL (ref 0.6–4.47)
LYMPHOCYTES NFR BLD AUTO: 21 % (ref 14–44)
MCH RBC QN AUTO: 29.9 PG (ref 26.8–34.3)
MCHC RBC AUTO-ENTMCNC: 33.3 G/DL (ref 31.4–37.4)
MCV RBC AUTO: 90 FL (ref 82–98)
MONOCYTES # BLD AUTO: 0.47 THOUSAND/ΜL (ref 0.17–1.22)
MONOCYTES NFR BLD AUTO: 7 % (ref 4–12)
NEUTROPHILS # BLD AUTO: 4.54 THOUSANDS/ΜL (ref 1.85–7.62)
NEUTS SEG NFR BLD AUTO: 68 % (ref 43–75)
NONHDLC SERPL-MCNC: 138 MG/DL
NRBC BLD AUTO-RTO: 0 /100 WBCS
NT-PROBNP SERPL-MCNC: 187 PG/ML
PLATELET # BLD AUTO: 261 THOUSANDS/UL (ref 149–390)
PMV BLD AUTO: 9.4 FL (ref 8.9–12.7)
POTASSIUM SERPL-SCNC: 4.2 MMOL/L (ref 3.5–5.3)
PROT SERPL-MCNC: 8.2 G/DL (ref 6.4–8.2)
RBC # BLD AUTO: 4.68 MILLION/UL (ref 3.81–5.12)
SODIUM SERPL-SCNC: 139 MMOL/L (ref 136–145)
TRIGL SERPL-MCNC: 213 MG/DL
TSH SERPL DL<=0.05 MIU/L-ACNC: 1.51 UIU/ML (ref 0.45–4.5)
VIT B12 SERPL-MCNC: 1229 PG/ML (ref 100–900)
WBC # BLD AUTO: 6.65 THOUSAND/UL (ref 4.31–10.16)

## 2022-06-06 PROCEDURE — 84443 ASSAY THYROID STIM HORMONE: CPT

## 2022-06-06 PROCEDURE — 86258 DGP ANTIBODY EACH IG CLASS: CPT

## 2022-06-06 PROCEDURE — 86231 EMA EACH IG CLASS: CPT

## 2022-06-06 PROCEDURE — 86140 C-REACTIVE PROTEIN: CPT

## 2022-06-06 PROCEDURE — 80061 LIPID PANEL: CPT

## 2022-06-06 PROCEDURE — 36415 COLL VENOUS BLD VENIPUNCTURE: CPT

## 2022-06-06 PROCEDURE — 82728 ASSAY OF FERRITIN: CPT

## 2022-06-06 PROCEDURE — 85025 COMPLETE CBC W/AUTO DIFF WBC: CPT

## 2022-06-06 PROCEDURE — 82607 VITAMIN B-12: CPT

## 2022-06-06 PROCEDURE — 86364 TISS TRNSGLTMNASE EA IG CLAS: CPT

## 2022-06-06 PROCEDURE — 83880 ASSAY OF NATRIURETIC PEPTIDE: CPT

## 2022-06-06 PROCEDURE — 82784 ASSAY IGA/IGD/IGG/IGM EACH: CPT

## 2022-06-06 PROCEDURE — 82306 VITAMIN D 25 HYDROXY: CPT

## 2022-06-06 PROCEDURE — 80053 COMPREHEN METABOLIC PANEL: CPT

## 2022-06-07 ENCOUNTER — APPOINTMENT (OUTPATIENT)
Dept: PHYSICAL THERAPY | Facility: MEDICAL CENTER | Age: 62
End: 2022-06-07
Payer: COMMERCIAL

## 2022-06-07 LAB
ENDOMYSIUM IGA SER QL: NEGATIVE
GLIADIN PEPTIDE IGA SER-ACNC: 1 UNITS (ref 0–19)
GLIADIN PEPTIDE IGG SER-ACNC: <1 UNITS (ref 0–19)
IGA SERPL-MCNC: 40 MG/DL (ref 87–352)
TTG IGA SER-ACNC: <2 U/ML (ref 0–3)
TTG IGG SER-ACNC: <2 U/ML (ref 0–5)

## 2022-06-07 RX ORDER — DEXLANSOPRAZOLE 60 MG/1
60 CAPSULE, DELAYED RELEASE ORAL DAILY
Qty: 30 CAPSULE | Refills: 3 | Status: SHIPPED | OUTPATIENT
Start: 2022-06-07

## 2022-06-08 ENCOUNTER — OFFICE VISIT (OUTPATIENT)
Dept: FAMILY MEDICINE CLINIC | Facility: CLINIC | Age: 62
End: 2022-06-08
Payer: COMMERCIAL

## 2022-06-08 VITALS
HEIGHT: 64 IN | DIASTOLIC BLOOD PRESSURE: 80 MMHG | HEART RATE: 65 BPM | BODY MASS INDEX: 35.24 KG/M2 | OXYGEN SATURATION: 94 % | TEMPERATURE: 96.4 F | SYSTOLIC BLOOD PRESSURE: 130 MMHG | WEIGHT: 206.4 LBS

## 2022-06-08 DIAGNOSIS — K55.069 MESENTERIC ARTERY THROMBOSIS (HCC): Primary | ICD-10-CM

## 2022-06-08 DIAGNOSIS — N30.10 INTERSTITIAL CYSTITIS: ICD-10-CM

## 2022-06-08 DIAGNOSIS — F41.9 ANXIETY AND DEPRESSION: ICD-10-CM

## 2022-06-08 DIAGNOSIS — F32.A ANXIETY AND DEPRESSION: ICD-10-CM

## 2022-06-08 DIAGNOSIS — I10 ESSENTIAL HYPERTENSION: ICD-10-CM

## 2022-06-08 DIAGNOSIS — E78.2 MIXED HYPERLIPIDEMIA: ICD-10-CM

## 2022-06-08 DIAGNOSIS — M79.602 LEFT ARM PAIN: ICD-10-CM

## 2022-06-08 PROCEDURE — 99214 OFFICE O/P EST MOD 30 MIN: CPT | Performed by: FAMILY MEDICINE

## 2022-06-08 RX ORDER — NALOXONE HYDROCHLORIDE 4 MG/.1ML
SPRAY NASAL
Qty: 1 EACH | Refills: 1 | Status: SHIPPED | OUTPATIENT
Start: 2022-06-08

## 2022-06-08 RX ORDER — LORAZEPAM 0.5 MG/1
0.5 TABLET ORAL EVERY 8 HOURS PRN
Qty: 90 TABLET | Refills: 0 | Status: SHIPPED | OUTPATIENT
Start: 2022-06-08 | End: 2022-07-08 | Stop reason: SDUPTHER

## 2022-06-08 RX ORDER — TRAMADOL HYDROCHLORIDE 50 MG/1
50 TABLET ORAL EVERY 6 HOURS PRN
Qty: 90 TABLET | Refills: 0 | Status: SHIPPED | OUTPATIENT
Start: 2022-06-08 | End: 2022-07-01 | Stop reason: SDUPTHER

## 2022-06-08 NOTE — PROGRESS NOTES
Assessment/Plan: celiac panel BNP vitamin-D C-reactive protein B12 level for than lipid profile TSH CMP CBC reviewed at present time  Patient has failed   Statins in the past   Patient will try to get Repatha with Cardiology  Patient follow-up with Urology appropriately  Patient will continue with  Installations  Refills given tramadol as well as Ativan  Tramadol increased due to increased pain  Patient will be referred to Interventional Radiology with history of mesenteric artery thrombosis/stenosis  Patient will continue with Ativan for anxiety  Refills given at this time  Patient will follow-up in 3 months or as needed       Diagnoses and all orders for this visit:    Mesenteric artery thrombosis [facility])  -     Ambulatory Referral to Interventional Radiology; Future  -     naloxone (NARCAN) 4 mg/0 1 mL nasal spray; Administer 1 spray into a nostril  If no response after 2-3 minutes, give another dose in the other nostril using a new spray  Anxiety and depression  -     LORazepam (ATIVAN) 0 5 mg tablet; Take 1 tablet (0 5 mg total) by mouth every 8 (eight) hours as needed for anxiety    Essential hypertension    Interstitial cystitis    Mixed hyperlipidemia    Left arm pain  -     traMADol (ULTRAM) 50 mg tablet; Take 1 tablet (50 mg total) by mouth every 6 (six) hours as needed for severe pain            Subjective:        Patient ID: Kate Smith is a 57 y o  female  Patient is here to follow-up on multiple issues including mesenteric artery thrombosis, anxiety and depression as well as abdominal pain and interstitial cystitis  Patient is getting infusions for interstitial cystitis  This seems to be helping  Patient still has left abdominal pain  Patient had this pain prior to the mesenteric thrombosis that was treated with stent  Patient did go to Interventional Radiology at [facility] and patient will need to go through another procedure for re thrombosed stent    Patient wishes 2nd opinion with Interventional Radiology at [facility]  No fevers or chills  No nausea vomiting or significant change in urination  Patient has noticed increased left abdominal pain  The following portions of the patient's history were reviewed and updated as appropriate: allergies, current medications, past family history, past medical history, past social history, past surgical history and problem list       Review of Systems   Constitutional: Negative  HENT: Negative  Eyes: Negative  Respiratory: Negative  Cardiovascular: Negative  Gastrointestinal: Positive for abdominal pain  Endocrine: Negative  Genitourinary: Negative  Musculoskeletal: Negative  Skin: Negative  Allergic/Immunologic: Negative  Neurological: Negative  Hematological: Negative  Psychiatric/Behavioral: Negative  Objective:               /80 (BP Location: Right arm, Patient Position: Sitting, Cuff Size: Large)   Pulse 65   Temp (!) 96 4 °F (35 8 °C) (Temporal)   Ht 5' 4" (1 626 m)   Wt 93 6 kg (206 lb 6 4 oz)   LMP  (LMP Unknown)   SpO2 94%   BMI 35 43 kg/m²          Physical Exam  Vitals and nursing note reviewed  Constitutional:       General: She is not in acute distress  Appearance: Normal appearance  She is not ill-appearing, toxic-appearing or diaphoretic  HENT:      Head: Normocephalic and atraumatic  Right Ear: Tympanic membrane, ear canal and external ear normal  There is no impacted cerumen  Left Ear: Tympanic membrane, ear canal and external ear normal  There is no impacted cerumen  Nose: Nose normal  No congestion or rhinorrhea  Mouth/Throat:      Mouth: Mucous membranes are moist       Pharynx: No oropharyngeal exudate or posterior oropharyngeal erythema  Eyes:      General: No scleral icterus  Right eye: No discharge  Left eye: No discharge  Extraocular Movements: Extraocular movements intact  Conjunctiva/sclera: Conjunctivae normal       Pupils: Pupils are equal, round, and reactive to light  Neck:      Vascular: No carotid bruit  Cardiovascular:      Rate and Rhythm: Normal rate and regular rhythm  Pulses: Normal pulses  Heart sounds: Normal heart sounds  No murmur heard  No friction rub  No gallop  Pulmonary:      Effort: Pulmonary effort is normal  No respiratory distress  Breath sounds: Normal breath sounds  No stridor  No wheezing, rhonchi or rales  Chest:      Chest wall: No tenderness  Abdominal:      General: Abdomen is flat  Bowel sounds are normal  There is no distension  Palpations: Abdomen is soft  Tenderness: There is abdominal tenderness  There is no guarding or rebound  Musculoskeletal:         General: Tenderness present  No swelling, deformity or signs of injury  Cervical back: Normal range of motion and neck supple  No rigidity  No muscular tenderness  Right lower leg: No edema  Left lower leg: No edema  Lymphadenopathy:      Cervical: No cervical adenopathy  Skin:     General: Skin is warm and dry  Capillary Refill: Capillary refill takes less than 2 seconds  Coloration: Skin is not jaundiced  Findings: No bruising, erythema, lesion or rash  Neurological:      Mental Status: She is alert and oriented to person, place, and time  Mental status is at baseline  Cranial Nerves: No cranial nerve deficit  Sensory: No sensory deficit  Motor: No weakness  Coordination: Coordination normal       Gait: Gait normal    Psychiatric:         Mood and Affect: Mood normal          Behavior: Behavior normal          Thought Content:  Thought content normal          Judgment: Judgment normal

## 2022-06-09 ENCOUNTER — OFFICE VISIT (OUTPATIENT)
Dept: DERMATOLOGY | Facility: CLINIC | Age: 62
End: 2022-06-09
Payer: COMMERCIAL

## 2022-06-09 ENCOUNTER — APPOINTMENT (OUTPATIENT)
Dept: PHYSICAL THERAPY | Facility: MEDICAL CENTER | Age: 62
End: 2022-06-09
Payer: COMMERCIAL

## 2022-06-09 ENCOUNTER — TELEPHONE (OUTPATIENT)
Dept: CARDIOLOGY CLINIC | Facility: CLINIC | Age: 62
End: 2022-06-09

## 2022-06-09 VITALS — TEMPERATURE: 98.2 F | WEIGHT: 206 LBS | BODY MASS INDEX: 35.17 KG/M2 | HEIGHT: 64 IN

## 2022-06-09 DIAGNOSIS — L30.4 INTERTRIGO: Primary | ICD-10-CM

## 2022-06-09 DIAGNOSIS — I87.2 VENOUS STASIS DERMATITIS OF BOTH LOWER EXTREMITIES: ICD-10-CM

## 2022-06-09 PROCEDURE — 99213 OFFICE O/P EST LOW 20 MIN: CPT | Performed by: STUDENT IN AN ORGANIZED HEALTH CARE EDUCATION/TRAINING PROGRAM

## 2022-06-09 RX ORDER — NYSTATIN 100000 [USP'U]/G
POWDER TOPICAL
Qty: 60 G | Refills: 3 | Status: SHIPPED | OUTPATIENT
Start: 2022-06-09

## 2022-06-09 NOTE — TELEPHONE ENCOUNTER
I called patient and on a 3 way call we called pharmacy confirmed approval and patient has 3 00 co-pay, patient is extremely thankful

## 2022-06-09 NOTE — PATIENT INSTRUCTIONS
1  INTERTRIGO (POSSIBLY/LIKELY CANDIDIASIS)     Assessment and Plan:  Based on a thorough discussion of this condition and the management approach to it (including a comprehensive discussion of the known risks, side effects and potential benefits of treatment), the patient (family) agrees to implement the following specific plan:  Please continue to use Nystatin powder 3 times weekly   Please continue to use Ketoconazole cream twice daily until the rash goes away   Use the Nystatin powder on top of Ketoconazole Cream      Assessment and Plan  Intertrigo describes a rash in the flexures or body folds, such as behind the ears, in the folds of the neck, under the arms (axillae), under a protruding abdomen, in the groin, between the buttocks, in the finger webs or toe spaces  Although intertrigo may affect one skin fold, it is common for it to involve multiple sites  Intertrigo can affect males and females of any age  It is particularly common in people that are overweight or obese (see metabolic syndrome)  Other contributing factors are:  Genetic tendency to skin disease  Hyperhidrosis (excessive sweating)     Intertrigo can be acute (recent onset), relapsing (recurrent), or chronic (present for more than 6 weeks)  The exact appearance and behaviour depends on the underlying cause or causes  The skin affected by intertrigo is inflamed, ie reddened and uncomfortable  It may become moist and macerated, leading to fissuring (cracks) and peeling  Intertrigo is due to genetic and environmental factors  Flexural skin has relatively high surface temperature  Moisture from insensible water loss and sweating cannot evaporate due to occlusion  Friction from movement of adjacent skin results in chafing  The microorganisms that are normally resident on flexural skin, the microbiome, include corynebacteria, other bacteria and yeasts  These multiply in warm moist environments and may cause disease       We can classify intertrigo into infectious and inflammatory origin but there is often overlap  Infections tend to be unilateral and asymmetrical   Inflammatory disorders tend to be symmetrical affecting armpits, groins, under the breasts and the abdominal folds, except atopic dermatitis, which more often arises on the neck, and in elbow and knee creases  Investigations may be necessary to determine the cause of intertrigo  A swab for microscopy and culture of bacteria (microbiology)  A scraping for microscopy and culture of fungi (mycology)  A skin biopsy may be performed for histopathology if the skin condition is unusual or fails to respond to treatment  What is the treatment for intertrigo? Treatment depends on the underlying cause, if identified, and on which micro-organisms are present in the rash  Combinations are common  Sweating may be reduced with a gentle antiperspirant  Physical exertion should be followed by a bath and completely drying the skin folds using a hair dryer on cool setting, soft towel and/or corn starch powder  Triple paste contains petrolatum, zinc oxide, and aluminum acetate solution to reduce friction, irritation and sweating  Bacteria may be treated with topical antibiotics such as fusidic acid cream, mupirocin ointment, or oral antibiotics such as flucloxacillin and erythromycin  Yeasts and fungi may be treated with topical antifungals such as clotrimazole and terbinafine cream or oral antifungal agents such as itraconazole or terbinafine  Inflammatory skin diseases are often treated with low potency topical steroid creams such as hydrocortisone  More potent steroids are usually avoided in the flexures because they may cause skin thinning resulting in stretch marks (striae) and even ulcers  Calcineurin inhibitors such as tacrolimus ointment or pimecrolimus cream may also prove effective      2  STATIS DERMATITIS ("VENUS ECZEMA")    Assessment and Plan:  Based on a thorough discussion of this condition and the management approach to it (including a comprehensive discussion of the known risks, side effects and potential benefits of treatment), the patient (family) agrees to implement the following specific plan:  Please use Triamcinolone 0 1% cream; twice daily until the rash goes away and then use Vaseline  Please use compression stockings and elevate legs as much as possible      What is venous eczema? Venous eczema is a common form of eczema/dermatitis that affects one or both lower legs in association with venous insufficiency  It is also called gravitational dermatitis  Who gets venous eczema? Venous eczema is most often seen in middle-aged and elderly patients -- it is reported to affect 20% of those over 70 years  It is associated with:  History of deep venous thrombosis in an affected limb  History of cellulitis in an affected limb  Chronic swelling of the lower leg, aggravated by hot weather and prolonged standing  Varicose veins  Venous leg ulcers  What causes venous eczema? Venous eczema appears to be due to fluid collecting in the tissues and activation of the innate immune response  Normally during walking the leg muscles pump blood upwards and valves in the veins prevent pooling  A clot in the deep leg veins (deep venous thrombosis or DVT) or varicose veins may damage the valves  As a result back pressure develops and fluid collects in the tissues  An inflammatory reaction occurs  What are the clinical features of venous eczema?   Venous eczema can form discrete patches or become confluent and circumferential  Features include:  Itchy red, blistered and crusted plaques; or dry fissured and scaly plaques on one or both lower legs  Orange-brown macular pigmentation due to haemosiderin deposition  Atrophie annalise (white irregular scars surrounded by red spots)  'Champagne bottle' shape of the lower leg -- narrowing at the ankles and induration (lipodermatosclerosis)    What are the complications of venous eczema? Impetiginisation -- secondary infection with Staphylococcus aureus resulting in yellowish crusts  Cellulitis -- infection with Streptococcus pyogenes: there may be redness, swelling, pain, fever, a red streak up the leg and swollen nodes in the groin  Secondary eczema -- the eczema spreads to other areas on the body  Contact allergy to one or more components of the ointments or creams used    How is venous eczema diagnosed? The diagnosis of venous eczema is clinical   Patch tests may be undertaken if there is suspicion of contact allergy  What is the treatment for venous eczema? Reduce swelling in the leg  Don't stand for long periods  Take regular walks  Elevate your feet when sitting: if your legs are swollen they need to be above your hips to drain effectively  Elevate the foot of your bed overnight  During the acute phase of eczema, bandaging is important to reduce swelling  When eczema has settled, wear graduated compression socks or stockings long term  Fitted moderate to high compression socks can be obtained from a surgical supplies company  Light compression using travel socks may be adequate, and these are easy to put on  They can be bought at pharmacies, travel and sports stores  More compression is obtained by wearing two pairs  Horse chestnut extract appears to be of benefit for at least some patients with venous disease  Treat the eczema  Dry up oozing patches with Condy's solution (potassium permanganate) or dilute vinegar on gauze as compresses  Oral antibiotics such as flucloxacillin may be prescribed for a secondary infection  Apply a prescribed topical steroid: start with a potent steroid cream applied accurately daily to the patches until they have flattened out  After a few days, change to a milder steroid cream (eg  hydrocortisone) until the itchy patches have resolved (maintenance treatment)   Check with your doctor if you are using steroid creams for more than a few weeks  Overuse can thin the skin, but short courses of stronger preparations can be used from time to time if necessary to control dermatitis  Coal tar ointment may also help  Use a moisturizing cream frequently to keep the skin on the legs smooth and soft  If the skin is very scaly, urea cream may be especially effective  Protect your skin from injury: this can result in infection or ulceration that may be difficult to heal     Treatment for varicose veins  Seek the opinion of a vascular surgeon regarding varicose veins  These can be treated surgically or sclerotherapy  Varicose veins may develop again after an apparently successful operation because venous disease is progressive  How can venous eczema be prevented? Venous eczema cannot be completely prevented but the number and severity of flare-ups can be reduced by the following measures  Avoid prolonged standing or sitting with legs down  Wear compression socks or stockings  Avoid and treat leg swelling  Apply emollients frequently and regularly to dry skin  Avoid soap; use water alone or non-soap cleansers when bathing    What is the outlook for venous eczema? Venous eczema tends to be a recurring or chronic disorder lifelong  Treat recurrence promptly with topical steroids

## 2022-06-09 NOTE — PROGRESS NOTES
[facility] Follow Up Note    Patient Name: Jb Burgess  Encounter Date: 06/09/2022    Today's Chief Concerns:  Monique Baez Concern #1:  Follow up Intertrigo       Current Medications:    Current Outpatient Medications:     acetaminophen (TYLENOL) 325 mg tablet, Take 650 mg by mouth as needed for mild pain , Disp: , Rfl:     apixaban (Eliquis) 5 mg, Take 1 tablet (5 mg total) by mouth 2 (two) times a day, Disp: 180 tablet, Rfl: 5    ascorbic acid (VITAMIN C) 1000 MG tablet, Take 1,000 mg by mouth daily, Disp: , Rfl:     azelastine (OPTIVAR) 0 05 % ophthalmic solution, Administer 1 drop to both eyes 2 (two) times a day, Disp: 6 mL, Rfl: 12    azelastine (OPTIVAR) 0 05 % ophthalmic solution, Administer 1 drop to both eyes 2 (two) times a day, Disp: 6 mL, Rfl: 12    butalbital-acetaminophen-caffeine (FIORICET,ESGIC) -40 mg per tablet, TAKE ONE TABLET BY MOUTH EVERY SIX HOURS AS NEEDED FOR HEADACHES, Disp: 30 tablet, Rfl: 0    Carafate 1 GM/10ML suspension, Take 10 mL (1 g total) by mouth 4 (four) times a day, Disp: 3600 mL, Rfl: 1    cephalexin (KEFLEX) 250 mg capsule, 500 mg, Disp: , Rfl:     cetirizine (ZyrTEC) 10 mg tablet, Take 1 tablet (10 mg total) by mouth daily for 365 doses, Disp: 30 tablet, Rfl: 11    chlordiazepoxide-clidinium (LIBRAX) 5-2 5 mg per capsule, Take 1 capsule by mouth 2 (two) times a day before meals, Disp: 60 capsule, Rfl: 5    chlorhexidine (PERIDEX) 0 12 % solution, , Disp: , Rfl:     Cholecalciferol 25 MCG (1000 UT) tablet, Take 1,000 Units by mouth daily, Disp: , Rfl:     clotrimazole-betamethasone (LOTRISONE) 1-0 05 % cream, , Disp: , Rfl:     dexlansoprazole (DEXILANT) 60 MG capsule, Take 1 capsule (60 mg total) by mouth daily Please take on an empty stomach 30 minutes before eating, Disp: 30 capsule, Rfl: 3    diltiazem (CARDIZEM) 120 MG tablet, Take 120 mg by mouth if needed (For heart palpitations), Disp: , Rfl:     ELDERBERRY PO, Take by mouth daily  , Disp: , Rfl:     Evolocumab 140 MG/ML SOAJ, Inject 1 mL (140 mg total) under the skin every 14 (fourteen) days, Disp: 2 mL, Rfl: 5    fexofenadine (ALLEGRA) 180 MG tablet, Take 1 tablet (180 mg total) by mouth daily IN AM, Disp: 30 tablet, Rfl: 12    fluticasone-vilanterol (BREO ELLIPTA) 200-25 MCG/INH inhaler, Inhale 1 puff daily Rinse mouth after use , Disp: 3 Inhaler, Rfl: 3    furosemide (LASIX) 40 mg tablet, Take 1 tablet (40 mg total) by mouth daily, Disp: 90 tablet, Rfl: 3    Ginger, Zingiber officinalis, (GINGER PO), Take 1 tablet by mouth in the morning, Disp: , Rfl:     hydrocortisone-pramoxine (PROCTOFOAM-HC) 1-1 % FOAM rectal foam, Insert 1 applicator into the rectum 2 (two) times a day, Disp: 10 g, Rfl: 2    ipratropium (ATROVENT) 0 06 % nasal spray, , Disp: , Rfl:     ketoconazole (NIZORAL) 2 % cream, Apply topically daily, Disp: 60 g, Rfl: 2    LORazepam (ATIVAN) 0 5 mg tablet, Take 1 tablet (0 5 mg total) by mouth every 8 (eight) hours as needed for anxiety, Disp: 90 tablet, Rfl: 0    Meth-Hyo-M Bl-Na Phos-Ph Sal (Uribel) 118 MG CAPS, Take 1 each by mouth Three times a day, Disp: , Rfl:     metoprolol succinate (TOPROL-XL) 100 mg 24 hr tablet, Take 1 tablet (100 mg total) by mouth 2 (two) times a day, Disp: 180 tablet, Rfl: 2    montelukast (SINGULAIR) 10 mg tablet, TAKE ONE TABLET BY MOUTH AT BEDTIME , Disp: 90 tablet, Rfl: 0    MULTIPLE VITAMIN PO, Take by mouth, Disp: , Rfl:     naloxone (NARCAN) 4 mg/0 1 mL nasal spray, Administer 1 spray into a nostril   If no response after 2-3 minutes, give another dose in the other nostril using a new spray , Disp: 1 each, Rfl: 1    nystatin (MYCOSTATIN) powder, , Disp: , Rfl:     omeprazole (PriLOSEC) 40 MG capsule, Take 1 capsule (40 mg total) by mouth daily, Disp: 309 capsule, Rfl: 4    ondansetron (Zofran ODT) 4 mg disintegrating tablet, Take 1 tablet (4 mg total) by mouth every 6 (six) hours as needed for nausea or vomiting, Disp: 60 tablet, Rfl: 2    phenazopyridine (PYRIDIUM) 200 mg tablet, Take 1 tablet (200 mg total) by mouth 3 (three) times a day with meals, Disp: 10 tablet, Rfl: 0    pimecrolimus (ELIDEL) 1 % cream, Apply topically to rash on eyelids and hands once or twice a daily  , Disp: 100 g, Rfl: 3    Probiotic Product (PROBIOTIC-10 PO), Take 1 tablet by mouth in the morning, Disp: , Rfl:     spironolactone (ALDACTONE) 25 mg tablet, TAKE ONE TABLET BY MOUTH TWICE DAILY, Disp: 180 tablet, Rfl: 0    sucralfate (CARAFATE) 1 g tablet, Take 1 tablet (1 g total) by mouth 4 (four) times a day, Disp: 360 tablet, Rfl: 1    traMADol (ULTRAM) 50 mg tablet, Take 1 tablet (50 mg total) by mouth every 6 (six) hours as needed for severe pain, Disp: 90 tablet, Rfl: 0    triamcinolone (KENALOG) 0 1 % cream, Apply topically 2 (two) times a day, Disp: 30 g, Rfl: 1    triamcinolone (KENALOG) 0 1 % cream, Apply topically 2 (two) times a day, Disp: 453 6 g, Rfl: 0    Turmeric 500 MG CAPS, , Disp: , Rfl:     Zinc 100 MG TABS, Take by mouth daily, Disp: , Rfl:     Current Facility-Administered Medications:     cyanocobalamin injection 1,000 mcg, 1,000 mcg, Intramuscular, Q30 Days, Mardell DO Jessica, 1,000 mcg at 03/17/22 1435    CONSTITUTIONAL:   Vitals:    06/09/22 0935   Temp: 98 2 °F (36 8 °C)   TempSrc: Temporal   Weight: 93 4 kg (206 lb)   Height: 5' 4" (1 626 m)       Specific Alerts:    Have you been seen by a [facility] in the last 3 years? YES    Are you pregnant or planning to become pregnant? No    Are you currently or planning to be nursing or breast feeding? No    Allergies   Allergen Reactions    Ace Inhibitors Angioedema and Anaphylaxis     Anaphylaxis    Hydralazine Other (See Comments)     Lip swelling  Flank pain    Other Other (See Comments) and Anaphylaxis     Other reaction(s): angioadema  Other reaction(s):  Other (See Comments)  Preservatives- itching throat closes, hi  Other reaction(s): angioadema  E Z Cat - hives throat closes itching,  Preservatives- itching throat closes, hi    Valsartan Angioedema     Lips, face swollen    Ampicillin Hives     Depends on brand some preservatives can react    Benicar [Olmesartan] Angioedema     See Allergy note from 9/11/2008  Swollen ankles/legs    Sulfa Antibiotics Other (See Comments)     stuffiness,itching,hives,throat closing       May we call your Preferred Phone number to discuss your specific medical information? YES    May we leave a detailed message that includes your specific medical information? YES    Have you traveled outside of the [facility] in the past 3 months? No    Do you currently have a pacemaker or defibrillator? No    Do you have any artificial heart valves, joints, plates, screws, rods, stents, pins, etc? YES   - If Yes, were any placed within the last 2 years? YES    Do you require any medications prior to a surgical procedure? No    Are you taking any medications that cause you to bleed more easily ("blood thinners") No    Have you ever experienced a rapid heartbeat with epinephrine? No    Have you ever been treated with "gold" (gold sodium thiomalate) therapy? No    [facility] can help with wrinkles, "laugh lines," facial volume loss, "double chin," "love handles," age spots, and more  Are you interested in learning today about some of the skin enhancement procedures that we offer? (If Yes, please provide more detail) No    Review of Systems:  Have you recently had or currently have any of the following?     · Fever or chills: No  · Night Sweats: YES  · Headaches: YES  · Weight Gain: No  · Weight Loss: No  · Blurry Vision: No  · Nausea: YES  · Vomiting: No  · Diarrhea: No  · Blood in Stool: No  · Abdominal Pain: YES  · Itchy Skin: YES  · Painful Joints: YES  · Swollen Joints: No  · Muscle Pain: YES  · Irregular Mole: No  · Sun Burn: No  · Dry Skin: YES  · Skin Color Changes: YES  · Scar or Keloid: No  · Cold Sores/Fever Blisters: No  · Bacterial Infections/MRSA: No  · Anxiety: No  · Depression: No  · Suicidal or Homicidal Thoughts: No      PSYCH: Normal mood and affect  EYES: Normal conjunctiva  ENT: Normal lips and oral mucosa  CARDIOVASCULAR: No edema  RESPIRATORY: Normal respirations  HEME/LYMPH/IMMUNO:  No ostensible sbuQ swelling except as noted below in ASSESSMENT AND PLAN BY DIAGNOSIS    FULL ORGAN SYSTEM SKIN EXAM (SKIN)   Hair, Face Normal except as noted below in Assessment       Right Hand Normal except as noted below in Assessment   Left Hand Normal except as noted below in Assessment   Chest Viewed areas Normal except as noted below in Assessment   Abdomen Normal except as noted below in Assessment           Right Leg Normal except as noted below in Assessment   Left Leg Normal except as noted below in Assessment       1  INTERTRIGO (POSSIBLY/LIKELY CANDIDIASIS)     Physical Exam:  · Anatomic Location Affected:  Groin and breast (bodyfolds)  · Morphological Description:   red plaques  · Pertinent Positives:  · Pertinent Negatives: n/a     Additional History of Present Condition:  Patient reports that she was using Ketoconazole cream and the rash improved   She stopped the medication and the rash came back and has recently restarted the Ketoconazole Cream on 06/02/2022       Assessment and Plan:  Based on a thorough discussion of this condition and the management approach to it (including a comprehensive discussion of the known risks, side effects and potential benefits of treatment), the patient (family) agrees to implement the following specific plan:  · Please continue to use Nystatin powder 3 times weekly (has been renewed for you)   · Please continue to use Ketoconazole cream twice daily until the rash goes away   · Use the Nystatin powder on top of Ketoconazole Cream      Assessment and Plan  Intertrigo describes a rash in the flexures or body folds, such as behind the ears, in the folds of the neck, under the arms (axillae), under a protruding abdomen, in the groin, between the buttocks, in the finger webs or toe spaces  Although intertrigo may affect one skin fold, it is common for it to involve multiple sites      Intertrigo can affect males and females of any age  It is particularly common in people that are overweight or obese (see metabolic syndrome)  Other contributing factors are:  · Genetic tendency to skin disease  · Hyperhidrosis (excessive sweating)     Intertrigo can be acute (recent onset), relapsing (recurrent), or chronic (present for more than 6 weeks)  The exact appearance and behaviour depends on the underlying cause or causes  The skin affected by intertrigo is inflamed, ie reddened and uncomfortable  It may become moist and macerated, leading to fissuring (cracks) and peeling  Intertrigo is due to genetic and environmental factors  · Flexural skin has relatively high surface temperature  · Moisture from insensible water loss and sweating cannot evaporate due to occlusion  · Friction from movement of adjacent skin results in chafing      The microorganisms that are normally resident on flexural skin, the microbiome, include corynebacteria, other bacteria and yeasts  These multiply in warm moist environments and may cause disease      We can classify intertrigo into infectious and inflammatory origin but there is often overlap  · Infections tend to be unilateral and asymmetrical   · Inflammatory disorders tend to be symmetrical affecting armpits, groins, under the breasts and the abdominal folds, except atopic dermatitis, which more often arises on the neck, and in elbow and knee creases      Investigations may be necessary to determine the cause of intertrigo    · A swab for microscopy and culture of bacteria (microbiology)  · A scraping for microscopy and culture of fungi (mycology)  · A skin biopsy may be performed for histopathology if the skin condition is unusual or fails to respond to treatment      What is the treatment for intertrigo? Treatment depends on the underlying cause, if identified, and on which micro-organisms are present in the rash  Combinations are common  · Sweating may be reduced with a gentle antiperspirant  · Physical exertion should be followed by a bath and completely drying the skin folds using a hair dryer on cool setting, soft towel and/or corn starch powder  · Triple paste contains petrolatum, zinc oxide, and aluminum acetate solution to reduce friction, irritation and sweating  · Bacteria may be treated with topical antibiotics such as fusidic acid cream, mupirocin ointment, or oral antibiotics such as flucloxacillin and erythromycin  · Yeasts and fungi may be treated with topical antifungals such as clotrimazole and terbinafine cream or oral antifungal agents such as itraconazole or terbinafine  · Inflammatory skin diseases are often treated with low potency topical steroid creams such as hydrocortisone  More potent steroids are usually avoided in the flexures because they may cause skin thinning resulting in stretch marks (striae) and even ulcers  Calcineurin inhibitors such as tacrolimus ointment or pimecrolimus cream may also prove effective        2  STATIS DERMATITIS ("VENOUS ECZEMA")    Physical Exam:   Anatomic Location Affected:  Bilateral lower legs    Morphological Description:  Scaly red plaques    Pertinent Positives: Pulses present in both pedal locations    Pertinent Negatives: Additional History of Present Condition:  N/A    Assessment and Plan:  Based on a thorough discussion of this condition and the management approach to it (including a comprehensive discussion of the known risks, side effects and potential benefits of treatment), the patient (family) agrees to implement the following specific plan:   Please use Triamcinolone 0 1% cream; twice daily until the rash goes away (not more than 2 weeks) and then use Vaseline      Please use compression stockings and elevate legs as much as possible (pulses strong in feet)      What is venous eczema? Venous eczema is a common form of eczema/dermatitis that affects one or both lower legs in association with venous insufficiency  It is also called gravitational dermatitis  Who gets venous eczema? Venous eczema is most often seen in middle-aged and elderly patients -- it is reported to affect 20% of those over 70 years  It is associated with:   History of deep venous thrombosis in an affected limb   History of cellulitis in an affected limb   Chronic swelling of the lower leg, aggravated by hot weather and prolonged standing   Varicose veins   Venous leg ulcers  What causes venous eczema? Venous eczema appears to be due to fluid collecting in the tissues and activation of the innate immune response  Normally during walking the leg muscles pump blood upwards and valves in the veins prevent pooling  A clot in the deep leg veins (deep venous thrombosis or DVT) or varicose veins may damage the valves  As a result back pressure develops and fluid collects in the tissues  An inflammatory reaction occurs  What are the clinical features of venous eczema? Venous eczema can form discrete patches or become confluent and circumferential  Features include:   Itchy red, blistered and crusted plaques; or dry fissured and scaly plaques on one or both lower legs   Orange-brown macular pigmentation due to haemosiderin deposition   Atrophie annalise (white irregular scars surrounded by red spots)   'Champagne bottle' shape of the lower leg -- narrowing at the ankles and induration (lipodermatosclerosis)    What are the complications of venous eczema?    Impetiginisation -- secondary infection with Staphylococcus aureus resulting in yellowish crusts   Cellulitis -- infection with Streptococcus pyogenes: there may be redness, swelling, pain, fever, a red streak up the leg and swollen nodes in the groin   Secondary eczema -- the eczema spreads to other areas on the body   Contact allergy to one or more components of the ointments or creams used    How is venous eczema diagnosed? The diagnosis of venous eczema is clinical   Patch tests may be undertaken if there is suspicion of contact allergy  What is the treatment for venous eczema? Reduce swelling in the leg   Don't stand for long periods   Take regular walks   Elevate your feet when sitting: if your legs are swollen they need to be above your hips to drain effectively   Elevate the foot of your bed overnight   During the acute phase of eczema, bandaging is important to reduce swelling   When eczema has settled, wear graduated compression socks or stockings long term  Fitted moderate to high compression socks can be obtained from a surgical supplies company  Light compression using travel socks may be adequate, and these are easy to put on  They can be bought at pharmacies, travel and sports stores  More compression is obtained by wearing two pairs   Horse chestnut extract appears to be of benefit for at least some patients with venous disease  Treat the eczema   Dry up oozing patches with Condy's solution (potassium permanganate) or dilute vinegar on gauze as compresses   Oral antibiotics such as flucloxacillin may be prescribed for a secondary infection   Apply a prescribed topical steroid: start with a potent steroid cream applied accurately daily to the patches until they have flattened out  After a few days, change to a milder steroid cream (eg  hydrocortisone) until the itchy patches have resolved (maintenance treatment)  Check with your doctor if you are using steroid creams for more than a few weeks  Overuse can thin the skin, but short courses of stronger preparations can be used from time to time if necessary to control dermatitis  Coal tar ointment may also help     Use a moisturizing cream frequently to keep the skin on the legs smooth and soft  If the skin is very scaly, urea cream may be especially effective   Protect your skin from injury: this can result in infection or ulceration that may be difficult to heal     Treatment for varicose veins   Seek the opinion of a vascular surgeon regarding varicose veins   These can be treated surgically or sclerotherapy   Varicose veins may develop again after an apparently successful operation because venous disease is progressive  How can venous eczema be prevented? Venous eczema cannot be completely prevented but the number and severity of flare-ups can be reduced by the following measures   Avoid prolonged standing or sitting with legs down   Wear compression socks or stockings   Avoid and treat leg swelling   Apply emollients frequently and regularly to dry skin   Avoid soap; use water alone or non-soap cleansers when bathing    What is the outlook for venous eczema? Venous eczema tends to be a recurring or chronic disorder lifelong  Treat recurrence promptly with topical steroids          Scribe Attestation    I,:  Abdirashid Oconnor am acting as a scribe while in the presence of the attending physician :       I,:  Christelle Murray MD personally performed the services described in this documentation    as scribed in my presence :

## 2022-06-14 ENCOUNTER — APPOINTMENT (OUTPATIENT)
Dept: PHYSICAL THERAPY | Facility: MEDICAL CENTER | Age: 62
End: 2022-06-14
Payer: COMMERCIAL

## 2022-06-16 ENCOUNTER — APPOINTMENT (OUTPATIENT)
Dept: LAB | Facility: HOSPITAL | Age: 62
End: 2022-06-16
Payer: COMMERCIAL

## 2022-06-16 ENCOUNTER — APPOINTMENT (OUTPATIENT)
Dept: PHYSICAL THERAPY | Facility: MEDICAL CENTER | Age: 62
End: 2022-06-16
Payer: COMMERCIAL

## 2022-06-16 DIAGNOSIS — K25.3 ACUTE GASTRIC ULCER WITHOUT HEMORRHAGE OR PERFORATION: ICD-10-CM

## 2022-06-16 PROCEDURE — 87338 HPYLORI STOOL AG IA: CPT

## 2022-06-17 LAB — H PYLORI AG STL QL IA: NEGATIVE

## 2022-06-20 DIAGNOSIS — E78.49 OTHER HYPERLIPIDEMIA: ICD-10-CM

## 2022-06-21 ENCOUNTER — VBI (OUTPATIENT)
Dept: ADMINISTRATIVE | Facility: OTHER | Age: 62
End: 2022-06-21

## 2022-06-21 ENCOUNTER — APPOINTMENT (OUTPATIENT)
Dept: PHYSICAL THERAPY | Facility: MEDICAL CENTER | Age: 62
End: 2022-06-21
Payer: COMMERCIAL

## 2022-06-22 ENCOUNTER — OFFICE VISIT (OUTPATIENT)
Dept: CARDIOLOGY CLINIC | Facility: CLINIC | Age: 62
End: 2022-06-22
Payer: COMMERCIAL

## 2022-06-22 VITALS
DIASTOLIC BLOOD PRESSURE: 78 MMHG | HEART RATE: 52 BPM | BODY MASS INDEX: 34.21 KG/M2 | HEIGHT: 64 IN | WEIGHT: 200.4 LBS | OXYGEN SATURATION: 96 % | SYSTOLIC BLOOD PRESSURE: 108 MMHG

## 2022-06-22 DIAGNOSIS — I48.0 PAROXYSMAL ATRIAL FIBRILLATION (HCC): ICD-10-CM

## 2022-06-22 DIAGNOSIS — E78.5 DYSLIPIDEMIA: ICD-10-CM

## 2022-06-22 DIAGNOSIS — I10 BENIGN ESSENTIAL HYPERTENSION: Primary | ICD-10-CM

## 2022-06-22 PROCEDURE — 99214 OFFICE O/P EST MOD 30 MIN: CPT | Performed by: INTERNAL MEDICINE

## 2022-06-22 RX ORDER — HEPARIN SODIUM 10000 [USP'U]/ML
INJECTION, SOLUTION INTRAVENOUS; SUBCUTANEOUS
COMMUNITY
Start: 2022-06-14

## 2022-06-22 RX ORDER — TRIAMCINOLONE ACETONIDE 40 MG/ML
INJECTION, SUSPENSION INTRA-ARTICULAR; INTRAMUSCULAR
COMMUNITY
Start: 2022-06-15

## 2022-06-22 NOTE — PROGRESS NOTES
Cardiology             Suleman Delgado  1960  [id number]                   Assessment/Plan:     Paroxysmal atrial fibrillation, diagnosed on Holter monitor 03/2020  Hypertension  Multiple drug intolerances and possible allergies  Probable heterozygous familial hypercholesterolemia, on Repatha  Obesity   Sleep apnea, compliant with CPAP therapy   Lower extremity edema, on furosemide, resolved           Regular rhythm on examination  Atrial fibrillation was reported on prior ED visit at [facility] 02/2022  A prior to that, Bethann Dakin monitoring revealed short runs of SVT, PACs, PVCs  Patient taking diltiazem 120 mg as needed  Continue Eliquis anticoagulation, continue metoprolol succinate 100 mg b i d  Tobin Gomes Blood pressure difficult to control as she has reported intolerances to multiple medications  HCTZ has caused "throat closing," atorvastatin has caused joint pains, valsartan has caused reported angioedema, hydralazine has caused "kidney pain," nifedipine caused headache and nausea, verapamil cause increase in palpitations  Home blood pressure log reviewed which runs in the 120s to 160s for the most part  She would like to continue current antihypertensive regimen  It may be considered increase spironolactone to 50 mg daily or add clonidine but she does not want any more medications due to high tendency of intolerance  Low-sodium diet has been advised    Continue CPAP therapy                 Follow-up with me in 6 months              Interval History:      This is a 64-year-old female diagnosed with paroxysmal atrial fibrillation on Holter monitoring 3/020   That time echocardiogram had revealed mild-to-moderate aortic regurgitation with normal left ventricular systolic function   Nuclear stress test June 2020 was unremarkable with normal perfusion      She has hypertension, and has multiple medications listed as allergies including beta-blockers, although she has been taking metoprolol for quite some time  Tonya Billings on her allergy list HCTZ states throat closing," she states that actually cause some ankle swelling along with amlodipine   Atorvastatin caused joint pain, although it is listed in her allergies also as throat closing    She seems to have had a true allergic reaction to hydralazine and valsartan   She states valsartan caused angioedema, and she does not remember what happened with hydralazine, although states she thinks it may have been ankle swelling      She was last seen by Dr Alvarez Sep 01/28/2021 at which time she was maintained on metoprolol succinate and spironolactone for hypertension   At 1 point she was asked to take diltiazem as needed for palpitations  Kit Duron has been maintained on Eliquis anticoagulation        She underwent a Zio monitor 03/2021 at [facility](should be scanned into media section, personally reviewed rhythm strips) revealing normal sinus rhythm with 8 runs of SVT, longest lasting 17 beats   There were 18 patient triggers, most of which correlated with PACs, short atrial runs, and PVCs  Teryl Block was no evidence of atrial fibrillation      Nuclear stress test 06/2020 was unremarkable     In August 2021 she was hospitalized at [facility] for epigastric and left upper quadrant pain, admitted for superior mesenteric artery thrombosis   She received heparin drip, and underwent SMA stenting with IR on 08/19/2021   She was initiated on Plavix, and continued on Eliquis      During her prior visit 10/15/2021 she was initiated on diltiazem which she later stopped due to headache and nausea   Subsequent nifedipine was tried which caused headache and nausea as well which she stopped on her own  Verapamil caused increase in palpitations      She went to [facility] ER due to urinary retention and was reported to be in atrial fibrillation      She presents today for follow-up  From a symptomatic standpoint she feels well    She gets chest discomfort occasion at rest and occasionally with exertion, similar to what she has felt in the past   Prior nuclear stress test was unremarkable  She has mild lower extremity edema which has been stable with some itching in her lower extremities  She denies much in way of shortness of breath  She denies dizziness, lightheadedness        Vitals:  Vitals:    06/22/22 0840   BP: 108/78   Pulse: (!) 52   SpO2: 96%   Weight: 90 9 kg (200 lb 6 4 oz)   Height: 5' 4" (1 626 m)         Past Medical History:   Diagnosis Date    A-fib ([facility] [address] ) 03/2020    Allergic     Anxiety     Arthritis     Asthma     Colon polyp     Depression     GERD (gastroesophageal reflux disease)     Heart murmur     Hives     Hyperlipidemia     Hypertension     Infertility, female     Migraine     Palpitations     Psychiatric disorder     Wears glasses      Social History     Socioeconomic History    Marital status:      Spouse name: Not on file    Number of children: 0    Years of education: Not on file    Highest education level: Not on file   Occupational History    Occupation: unemployed   Tobacco Use    Smoking status: Former Smoker     Packs/day: 0 50     Years: 10 00     Pack years: 5 00     Quit date: 2019     Years since quitting: 3 4    Smokeless tobacco: Never Used   Vaping Use    Vaping Use: Never used   Substance and Sexual Activity    Alcohol use: No    Drug use: No    Sexual activity: Not Currently   Other Topics Concern    Not on file   Social History Narrative    Who lives in your home: Alone     What type of home do you live in: Apartment     Age of your home: 1952 built     How long have you been living there: 5 yrs     Type of heat: forced hot air     Type of fuel: electric     What type of jamin is in your bedroom: carpet jamin     Do you have the following in or near your home:    Air products: Humidifier in the bedroom/kitchen     Pests: none     Pets: none     Are pets allowed in bedroom: N/A     Open fields, wooded areas nearby: No     Basement: [id number] mold     Exposure to second hand smoke: No    Central air     Habits:    Caffeine: Hot tea 1 cup daily- ice tea 1 cup in the afternoon    Chocolate: Occasionally      Social Determinants of Health     Financial Resource Strain: Not on file   Food Insecurity: Not on file   Transportation Needs: Not on file   Physical Activity: Not on file   Stress: Not on file   Social Connections: Not on file   Intimate Partner Violence: Not on file   Housing Stability: Not on file      Family History   Problem Relation Age of Onset    Lung cancer Mother     Brain cancer Mother     Diabetes Father     Depression Father     Other Father         septic     Allergies Sister     Hashimoto's thyroiditis Sister     Abdominal aortic aneurysm Sister     Diabetes Sister     No Known Problems Sister     Hodgkin's lymphoma Brother     No Known Problems Brother     Diabetes Other      Past Surgical History:   Procedure Laterality Date    COLONOSCOPY      EGD      EXPLORATORY LAPAROTOMY      for infertility    HAND SURGERY      Hand excision of tendon cyst    NE LAP,APPENDECTOMY N/A 5/3/2022    Procedure: APPENDECTOMY LAPAROSCOPIC;  Surgeon:  Latrice Pond MD;  Location: [facility];  Service: General    SUPERIOR MESTENTERIC ARTERY STENT      WISDOM TOOTH EXTRACTION         Current Outpatient Medications:     acetaminophen (TYLENOL) 325 mg tablet, Take 650 mg by mouth as needed for mild pain , Disp: , Rfl:     apixaban (Eliquis) 5 mg, Take 1 tablet (5 mg total) by mouth 2 (two) times a day, Disp: 180 tablet, Rfl: 5    ascorbic acid (VITAMIN C) 1000 MG tablet, Take 1,000 mg by mouth daily, Disp: , Rfl:     azelastine (OPTIVAR) 0 05 % ophthalmic solution, Administer 1 drop to both eyes 2 (two) times a day, Disp: 6 mL, Rfl: 12    butalbital-acetaminophen-caffeine (FIORICET,ESGIC) -40 mg per tablet, TAKE ONE TABLET BY MOUTH EVERY SIX HOURS AS NEEDED FOR HEADACHES, Disp: 30 tablet, Rfl: 0    Carafate 1 GM/10ML suspension, Take 10 mL (1 g total) by mouth 4 (four) times a day, Disp: 3600 mL, Rfl: 1    cephalexin (KEFLEX) 250 mg capsule, 500 mg, Disp: , Rfl:     cetirizine (ZyrTEC) 10 mg tablet, Take 1 tablet (10 mg total) by mouth daily for 365 doses, Disp: 30 tablet, Rfl: 11    chlordiazepoxide-clidinium (LIBRAX) 5-2 5 mg per capsule, Take 1 capsule by mouth 2 (two) times a day before meals, Disp: 60 capsule, Rfl: 5    chlorhexidine (PERIDEX) 0 12 % solution, , Disp: , Rfl:     Cholecalciferol 25 MCG (1000 UT) tablet, Take 1,000 Units by mouth daily, Disp: , Rfl:     clotrimazole-betamethasone (LOTRISONE) 1-0 05 % cream, , Disp: , Rfl:     dexlansoprazole (DEXILANT) 60 MG capsule, Take 1 capsule (60 mg total) by mouth daily Please take on an empty stomach 30 minutes before eating, Disp: 30 capsule, Rfl: 3    diltiazem (CARDIZEM) 120 MG tablet, Take 120 mg by mouth if needed (For heart palpitations), Disp: , Rfl:     Evolocumab 140 MG/ML SOAJ, Inject 1 mL (140 mg total) under the skin every 14 (fourteen) days, Disp: 2 mL, Rfl: 5    fluticasone-vilanterol (BREO ELLIPTA) 200-25 MCG/INH inhaler, Inhale 1 puff daily Rinse mouth after use , Disp: 3 Inhaler, Rfl: 3    furosemide (LASIX) 40 mg tablet, Take 1 tablet (40 mg total) by mouth daily, Disp: 90 tablet, Rfl: 3    Ginger, Zingiber officinalis, (GINGER PO), Take 1 tablet by mouth in the morning, Disp: , Rfl:     Heparin Sodium, Porcine, (heparin, porcine,) 28710 UNIT/ML, , Disp: , Rfl:     hydrocortisone-pramoxine (PROCTOFOAM-HC) 1-1 % FOAM rectal foam, Insert 1 applicator into the rectum 2 (two) times a day, Disp: 10 g, Rfl: 2    ipratropium (ATROVENT) 0 06 % nasal spray, , Disp: , Rfl:     ketoconazole (NIZORAL) 2 % cream, Apply topically daily, Disp: 60 g, Rfl: 2    LORazepam (ATIVAN) 0 5 mg tablet, Take 1 tablet (0 5 mg total) by mouth every 8 (eight) hours as needed for anxiety, Disp: 90 tablet, Rfl: 0    Meth-Hyo-M Bl-Na Phos-Ph Sal (Uribel) 118 MG CAPS, Take 1 each by mouth Three times a day, Disp: , Rfl:     metoprolol succinate (TOPROL-XL) 100 mg 24 hr tablet, Take 1 tablet (100 mg total) by mouth 2 (two) times a day, Disp: 180 tablet, Rfl: 2    montelukast (SINGULAIR) 10 mg tablet, TAKE ONE TABLET BY MOUTH AT BEDTIME , Disp: 90 tablet, Rfl: 0    MULTIPLE VITAMIN PO, Take by mouth, Disp: , Rfl:     nystatin (MYCOSTATIN) powder, Apply topically three times a weekly to affected area, Disp: 60 g, Rfl: 3    omeprazole (PriLOSEC) 40 MG capsule, Take 1 capsule (40 mg total) by mouth daily, Disp: 309 capsule, Rfl: 4    ondansetron (Zofran ODT) 4 mg disintegrating tablet, Take 1 tablet (4 mg total) by mouth every 6 (six) hours as needed for nausea or vomiting, Disp: 60 tablet, Rfl: 2    phenazopyridine (PYRIDIUM) 200 mg tablet, Take 1 tablet (200 mg total) by mouth 3 (three) times a day with meals, Disp: 10 tablet, Rfl: 0    pimecrolimus (ELIDEL) 1 % cream, Apply topically to rash on eyelids and hands once or twice a daily  , Disp: 100 g, Rfl: 3    Probiotic Product (PROBIOTIC-10 PO), Take 1 tablet by mouth in the morning, Disp: , Rfl:     spironolactone (ALDACTONE) 25 mg tablet, TAKE ONE TABLET BY MOUTH TWICE DAILY, Disp: 180 tablet, Rfl: 0    sucralfate (CARAFATE) 1 g tablet, TAKE ONE TABLET BY MOUTH FOUR TIMES DAILY, Disp: 360 tablet, Rfl: 0    traMADol (ULTRAM) 50 mg tablet, Take 1 tablet (50 mg total) by mouth every 6 (six) hours as needed for severe pain, Disp: 90 tablet, Rfl: 0    triamcinolone (KENALOG) 0 1 % cream, Apply topically 2 (two) times a day, Disp: 453 6 g, Rfl: 0    triamcinolone acetonide (KENALOG-40) 40 mg/mL, , Disp: , Rfl:     Turmeric 500 MG CAPS, , Disp: , Rfl:     Zinc 100 MG TABS, Take by mouth daily, Disp: , Rfl:     azelastine (OPTIVAR) 0 05 % ophthalmic solution, Administer 1 drop to both eyes 2 (two) times a day, Disp: 6 mL, Rfl: 12    ELDERBERRY PO, Take by mouth daily   (Patient not taking: Reported on 6/22/2022), Disp: , Rfl:     fexofenadine (ALLEGRA) 180 MG tablet, Take 1 tablet (180 mg total) by mouth daily IN AM, Disp: 30 tablet, Rfl: 12    naloxone (NARCAN) 4 mg/0 1 mL nasal spray, Administer 1 spray into a nostril  If no response after 2-3 minutes, give another dose in the other nostril using a new spray  (Patient not taking: No sig reported), Disp: 1 each, Rfl: 1    triamcinolone (KENALOG) 0 1 % cream, Apply topically 2 (two) times a day, Disp: 30 g, Rfl: 1    Current Facility-Administered Medications:     cyanocobalamin injection 1,000 mcg, 1,000 mcg, Intramuscular, Q30 Days, Colon [place], DO, 1,000 mcg at 03/17/22 1435        Review of Systems:  Review of Systems   Constitutional: Negative for activity change, fever and unexpected weight change  HENT: Negative for facial swelling, nosebleeds and voice change  Respiratory: Negative for chest tightness, shortness of breath and wheezing  Cardiovascular: Positive for chest pain and leg swelling  Negative for palpitations  Gastrointestinal: Negative for abdominal distention  Genitourinary: Negative for hematuria  Musculoskeletal: Negative for arthralgias  Skin: Negative for color change, pallor, rash and wound  Neurological: Negative for dizziness, seizures and syncope  Psychiatric/Behavioral: Negative for agitation  Physical Exam:  Physical Exam  Vitals reviewed  Constitutional:       Appearance: She is well-developed  HENT:      Head: Normocephalic and atraumatic  Cardiovascular:      Rate and Rhythm: Normal rate and regular rhythm  Heart sounds: Normal heart sounds  Pulmonary:      Effort: Pulmonary effort is normal       Breath sounds: Normal breath sounds  Abdominal:      Palpations: Abdomen is soft  Musculoskeletal:         General: Normal range of motion  Cervical back: Normal range of motion and neck supple  Skin:     General: Skin is warm and dry  Neurological:      Mental Status: She is alert and oriented to person, place, and time  Psychiatric:         Behavior: Behavior normal          Thought Content: Thought content normal          Judgment: Judgment normal          This note was completed in part utilizing [employer] Fluency Direct Software  Grammatical errors, random word insertions, spelling mistakes, and incomplete sentences can be an occasional consequence of this system secondary to software limitations, ambient noise, and hardware issues  If you have any questions or concerns about the content, text, or information contained within the body of this dictation, please contact the provider for clarification

## 2022-06-23 ENCOUNTER — APPOINTMENT (OUTPATIENT)
Dept: PHYSICAL THERAPY | Facility: MEDICAL CENTER | Age: 62
End: 2022-06-23
Payer: COMMERCIAL

## 2022-06-23 ENCOUNTER — CONSULT (OUTPATIENT)
Dept: VASCULAR SURGERY | Facility: CLINIC | Age: 62
End: 2022-06-23
Payer: COMMERCIAL

## 2022-06-23 VITALS
HEIGHT: 64 IN | SYSTOLIC BLOOD PRESSURE: 126 MMHG | HEART RATE: 57 BPM | WEIGHT: 203.6 LBS | DIASTOLIC BLOOD PRESSURE: 82 MMHG | BODY MASS INDEX: 34.76 KG/M2

## 2022-06-23 DIAGNOSIS — K55.069 MESENTERIC ARTERY THROMBOSIS (HCC): ICD-10-CM

## 2022-06-23 PROCEDURE — 99244 OFF/OP CNSLTJ NEW/EST MOD 40: CPT | Performed by: SURGERY

## 2022-06-23 NOTE — PROGRESS NOTES
Assessment/Plan:    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HCC)  History of mesenteric artery thrombosis resulting in SMA stent placed at [facility] in August of 2021  Patient has had subsequent duplex studies and a CTA of the chest performed to rule out pulmonary embolus  The CTA, which I reviewed shows a patent SMA stent  Mesenteric duplex shows somewhat elevated velocities  However patient is symptoms are quite varied and not entirely consistent with mesenteric ischemia  Her celiac and RAMBO are patent  I have suggested to the patient she undergo colonoscopy  She will call GI to arrange  I have also asked her to keep a log of her pain in relation to her food intake  She will return in a few weeks to re-evaluate after her colonoscopy  Diagnoses and all orders for this visit:    Mesenteric artery thrombosis [facility])  -     Ambulatory Referral to Vascular Surgery          Subjective:      Patient ID: Suzi Rainey is a 57 y o  female  Pt is new and was referred by Azul Verdugo DO for mesenteric artery stenosis  Pt is had CT ABD/ PEL 4/13/22  Pt c/o ABD pain on the L side  Pt says she is coming in for a second opinion, pt had SMA stent placement 8/19/21 [facility]  Pt says since surgery she has been having L sided ABD pain, pain after eating and lower back pain  Pt is taking Eliquis  Pt is a former smoker  57y o  year old female being seen today with multiple pain complaints  Pt presented to [address] in August 2021 with complaints of persistent post prandial abdominal pain  Workup with CT 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 and she underwent visceral arteriogram with SMA stent placement on 8/19/21  She was last seen at [address] [place] [address] in October and at that time was experiencing persistent abdominal pain  She was referred to GI and underwent endoscopy with upcoming colonoscopy in the near future  She has also underwent an appendectomy in May     Today she has multiple complaints and reports persistent left sided abdominal pain which is worse after eating  However when questioning her further on her abdominal pain she states the left flank pain is constant and she can feel a lump at her left flank and states bruising comes and goes  The patient is morbidly obese and has reportedly has gained weight  Additionally, a CTA was performed in May of this year which shows the SMA stent to be patent  Patient underwent a duplex study which showed somewhat elevated velocities of 260 cm per second  She also reports nausea and constipation which she takes miralax and zofran  She has tried to change her diet, but notices no differences  She also suffers from interstitial cystitis  In addition, she is currently receiving physical therapy for back pain  She is currently on eliquis for afib which is managed by her cardiologist  She has an upcoming appt for evaluation of palpitations  She never began taking ASA after she completed her course of plavix post stent placement  She reports recent headaches with pulsatile tinnitus of the left ear which she describes as a "whooshing" noise  She is also under stress as she was terminated from her job in September and has not found anything since due to her health concerns  Overall, I do not believe the patient's symptoms are consistent with mesenteric ischemia  I have strongly suggested the patient pursue obtaining a colonoscopy  I have also asked the patient to keep a daily log with regards to discomfort associated with eating  Patient will return to this office after her colonoscopy for further discussion      SMA Origin: 140 3-170 2 cm/sec  SMA Mid: 246 8-258 6 cm/sec  SMA Distal: 85 9 -97 1cm/sec          The following portions of the patient's history were reviewed and updated as appropriate: allergies, current medications, past family history, past medical history, past social history, past surgical history and problem list     Review of Systems Constitutional: Negative  HENT: Negative  Eyes: Negative  Respiratory: Negative  Cardiovascular: Negative  Gastrointestinal: Positive for abdominal pain  Endocrine: Negative  Genitourinary: Negative  Musculoskeletal: Negative  Skin: Negative  Allergic/Immunologic: Negative  Neurological: Negative  Hematological: Negative  Psychiatric/Behavioral: Negative  Objective:      /82 (BP Location: Right arm, Patient Position: Sitting, Cuff Size: Standard)   Pulse 57   Ht 5' 4" (1 626 m)   Wt 92 4 kg (203 lb 9 6 oz)   LMP  (LMP Unknown)   BMI 34 95 kg/m²          Physical Exam  Vitals and nursing note reviewed  Constitutional:       Appearance: She is well-developed  HENT:      Head: Normocephalic and atraumatic  Eyes:      Conjunctiva/sclera: Conjunctivae normal       Pupils: Pupils are equal, round, and reactive to light  Neck:      Vascular: No JVD  Cardiovascular:      Rate and Rhythm: Normal rate and regular rhythm  Heart sounds: Normal heart sounds  Pulmonary:      Effort: Pulmonary effort is normal  No respiratory distress  Breath sounds: Normal breath sounds  No stridor  No wheezing or rales  Chest:      Chest wall: No tenderness  Abdominal:      General: There is no distension  Palpations: Abdomen is soft  There is no mass  Tenderness: There is no abdominal tenderness  There is no guarding or rebound  Comments: Tender left flank  No palpable mass on my examination  Musculoskeletal:         General: No tenderness or deformity  Normal range of motion  Cervical back: Normal range of motion and neck supple  Skin:     General: Skin is warm and dry  Findings: No erythema or rash  Neurological:      Mental Status: She is alert and oriented to person, place, and time  Psychiatric:         Behavior: Behavior normal          Thought Content:  Thought content normal

## 2022-06-23 NOTE — LETTER
June 24, 2022     Emma Dilcia, [address] [facility] [address]    Patient: Maria Elena Serrano   YOB: 1960   Date of Visit: 6/23/2022       Dear Dr Munroe Farm:    Thank you for referring Maria Elena Serrano to me for evaluation  Below are the relevant portions of my assessment and plan of care  57y o  year old female being seen today with multiple pain complaints  Pt presented to [address] in August 2021 with complaints of persistent post prandial abdominal pain  Workup with CT 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 and she underwent visceral arteriogram with SMA stent placement on 8/19/21  She was last seen at [address] [place] [address] [address] in October and at that time was experiencing persistent abdominal pain  She was referred to GI and underwent endoscopy with upcoming colonoscopy in the near future  She has also underwent an appendectomy in May  Today she has multiple complaints and reports persistent left sided abdominal pain which is worse after eating  However when questioning her further on her abdominal pain she states the left flank pain is constant and she can feel a lump at her left flank and states bruising comes and goes  The patient is morbidly obese and has reportedly has gained weight  Additionally, a CTA was performed in May of this year which shows the SMA stent to be patent  Patient underwent a duplex study which showed somewhat elevated velocities of 260 cm per second  She also reports nausea and constipation which she takes miralax and zofran  She has tried to change her diet, but notices no differences  She also suffers from interstitial cystitis  In addition, she is currently receiving physical therapy for back pain  She is currently on eliquis for afib which is managed by her cardiologist  She has an upcoming appt for evaluation of palpitations  She never began taking ASA after she completed her course of plavix post stent placement   She reports recent headaches with pulsatile tinnitus of the left ear which she describes as a "whooshing" noise  She is also under stress as she was terminated from her job in September and has not found anything since due to her health concerns  Overall, I do not believe the patient's symptoms are consistent with mesenteric ischemia  I have strongly suggested the patient pursue obtaining a colonoscopy  I have also asked the patient to keep a daily log with regards to discomfort associated with eating  Patient will return to this office after her colonoscopy for further discussion  If you have questions, please do not hesitate to call me  I look forward to following Richa Fields along with you           Sincerely,        Sandra Alexander MD        CC: No Recipients

## 2022-06-24 ENCOUNTER — TELEPHONE (OUTPATIENT)
Dept: GASTROENTEROLOGY | Facility: CLINIC | Age: 62
End: 2022-06-24

## 2022-06-24 NOTE — TELEPHONE ENCOUNTER
Patients GI provider:  Dr Rashid Serrano    Number to return call: [phone]    Reason for call: Pt calling to schedule procedure  She says Dr Saurabh Winslow spoke to Dr Rashid Serrano about patient having a colonoscopy  Patient states it is urgent      Scheduled procedure/appointment date if applicable: N/A

## 2022-06-24 NOTE — ASSESSMENT & PLAN NOTE
History of mesenteric artery thrombosis resulting in SMA stent placed at [facility] in August of 2021  Patient has had subsequent duplex studies and a CTA of the chest performed to rule out pulmonary embolus  The CTA, which I reviewed shows a patent SMA stent  Mesenteric duplex shows somewhat elevated velocities  However patient is symptoms are quite varied and not entirely consistent with mesenteric ischemia  Her celiac and RAMBO are patent  I have suggested to the patient she undergo colonoscopy  She will call GI to arrange  I have also asked her to keep a log of her pain in relation to her food intake  She will return in a few weeks to re-evaluate after her colonoscopy

## 2022-06-28 ENCOUNTER — APPOINTMENT (OUTPATIENT)
Dept: PHYSICAL THERAPY | Facility: MEDICAL CENTER | Age: 62
End: 2022-06-28
Payer: COMMERCIAL

## 2022-06-30 ENCOUNTER — APPOINTMENT (OUTPATIENT)
Dept: PHYSICAL THERAPY | Facility: MEDICAL CENTER | Age: 62
End: 2022-06-30
Payer: COMMERCIAL

## 2022-07-05 ENCOUNTER — TELEPHONE (OUTPATIENT)
Dept: GASTROENTEROLOGY | Facility: CLINIC | Age: 62
End: 2022-07-05

## 2022-07-05 ENCOUNTER — PREP FOR PROCEDURE (OUTPATIENT)
Dept: GASTROENTEROLOGY | Facility: CLINIC | Age: 62
End: 2022-07-05

## 2022-07-05 DIAGNOSIS — R10.84 GENERALIZED ABDOMINAL PAIN: Primary | ICD-10-CM

## 2022-07-05 RX ORDER — SODIUM, POTASSIUM,MAG SULFATES 17.5-3.13G
1 SOLUTION, RECONSTITUTED, ORAL ORAL ONCE
Qty: 177 ML | Refills: 0 | Status: SHIPPED | OUTPATIENT
Start: 2022-07-05 | End: 2022-09-04

## 2022-07-05 NOTE — TELEPHONE ENCOUNTER
Our mutual patient is scheduled for procedure: Colonoscopy    On: _07_/_ 13_/_22 _      With: Dr Cr___    He/She is taking the following blood thinner:  Eliquis        Can this be stopped ___2__ days prior to the procedure?       Physician Approving clearance: ________________________

## 2022-07-05 NOTE — TELEPHONE ENCOUNTER
Please see attached msg from pt  I did not see an order in the system  Please advise  Note:  I see you have some available slots for tomorrow or one slot available for the day after

## 2022-07-05 NOTE — TELEPHONE ENCOUNTER
Correct please schedule colonoscopy, ok to schedule this week if space is available, that would be great

## 2022-07-05 NOTE — TELEPHONE ENCOUNTER
Our mutual patient is scheduled for procedure: Colonoscopy     On: _07_/_ 13_/_22 _       With: Dr Cr___     He/She is taking the following blood thinner:  Eliquis                                              Can this be stopped ___2__ days prior to the procedure?   Yes     Physician Approving clearance:

## 2022-07-05 NOTE — TELEPHONE ENCOUNTER
Scheduled date of colonoscopy (as of today): 07/13/2022  Physician performing colonoscopy: Dr Gemma Lemos  Location of colonoscopy: [facility]  Bowel prep reviewed with patient: Miralax/Duclulax  Instructions reviewed with patient by: Prep Instructions emailed to pt   Clearances: Dr Kiki Parry

## 2022-07-06 NOTE — TELEPHONE ENCOUNTER
Spoke with pt and relayed Dr Mina Simental okay to hold Eliquis 2 day prior to procedure scheduled for 07/13/2022  Also emailed prep instructions to [email or web address]

## 2022-07-08 ENCOUNTER — TELEPHONE (OUTPATIENT)
Dept: GASTROENTEROLOGY | Facility: CLINIC | Age: 62
End: 2022-07-08

## 2022-07-08 DIAGNOSIS — R10.84 GENERALIZED ABDOMINAL PAIN: Primary | ICD-10-CM

## 2022-07-08 DIAGNOSIS — F32.A ANXIETY AND DEPRESSION: ICD-10-CM

## 2022-07-08 DIAGNOSIS — F41.9 ANXIETY AND DEPRESSION: ICD-10-CM

## 2022-07-08 DIAGNOSIS — K59.09 OTHER CONSTIPATION: ICD-10-CM

## 2022-07-08 PROBLEM — Z90.49 S/P APPENDECTOMY: Status: ACTIVE | Noted: 2022-07-08

## 2022-07-08 RX ORDER — LORAZEPAM 0.5 MG/1
0.5 TABLET ORAL EVERY 8 HOURS PRN
Qty: 90 TABLET | Refills: 0 | Status: SHIPPED | OUTPATIENT
Start: 2022-07-08 | End: 2022-08-08 | Stop reason: SDUPTHER

## 2022-07-08 NOTE — TELEPHONE ENCOUNTER
Patients GI provider:  [facility]     Number to return call: [phone]     Reason for call: Pt calling asking if she get have an alternative prep for her procedure as her insurance does not cover her current prep  Pt also asking if she can be called before 4:30pm the day before her procedure due to [place] transportation  Please reach out to pt regarding this matter      Scheduled procedure/appointment date if applicable: Procedure: 5/60/7836

## 2022-07-08 NOTE — TELEPHONE ENCOUNTER
Called and spoke with pt  Suprep was too expensive  Emailed instructions for Miralax/Dulcolax as discussed with pt on 6/24  Called pt who stated she was allergic to artificial sweeteners so she was concerned about the Gatorade  Pt agreeable to Golytely  Recommended pt confirm with pharmacist that there are no artificial sweeteners in this prior to consumption  Also added to her allergy list   Warning with Golytely and chronic appendicitis but pt states she had appendectomy on 5/3/22  Informed pt lab will notify her of time prior to 1630 hrs, but if she doesn't hear from them by 330/4 then she should call the office  Pt states Zaynab Wooten will take her home as they did after her EGD on 4/7/22, which was allowed at that time  Reviewed stopping Eliquis 2 days prior as per Dr Jeanie Dunn  No further questions/concerns

## 2022-07-11 ENCOUNTER — NURSE TRIAGE (OUTPATIENT)
Dept: OTHER | Facility: OTHER | Age: 62
End: 2022-07-11

## 2022-07-11 NOTE — TELEPHONE ENCOUNTER
Patient called in to inquire if bowel prep prescribed Golytely has any artificial sweetner in it  Patient reports lip swelling with some artificial sweeteners and only uses regular sugar or xylitol  Procedure scheduled for 7/13/22  Please follow up with patient for bowel prep  Reason for Disposition   [1] Caller has URGENT question or concern AND [2] triager unable to answer question    Answer Assessment - Initial Assessment Questions  1  DATE/TIME: "When did you have your colonoscopy?"       Scheduled 7/13/22  2  MAIN CONCERN: "What is your main concern right now?" "What questions do you have?"      Does Golytely prep have an artificial sweetner in ingredients; patient has allergy; only take regular sugar or xylitol  Reports lip swelling with some articial sweetners      Protocols used: COLONOSCOPY SYMPTOMS AND QUESTIONS-ADULT-

## 2022-07-11 NOTE — TELEPHONE ENCOUNTER
I returned call to patient     I advised according to ingredients ONLINE there is no " sweeteners"  listed in the ingredients     I asked pt to Read the label in the side of the bottle /triple check with pharmacist     Patient read all ingredients which there are no concerning "sugars"     Listed with flavor is Lemon - I advised she would have to call  to get ingredients of their Lemon     Patient states she spoke with her pharmacist who was not able to advise       Patient states she did call  and is awaiting a call back

## 2022-07-11 NOTE — TELEPHONE ENCOUNTER
Regarding: Colonoscopy prep  ----- Message from [facility] sent at 7/11/2022  1:37 PM EDT -----  "I need to know if my colonoscopy prep has artificial sweetener because I am allergic"

## 2022-07-13 ENCOUNTER — ANESTHESIA (OUTPATIENT)
Dept: GASTROENTEROLOGY | Facility: HOSPITAL | Age: 62
End: 2022-07-13

## 2022-07-13 ENCOUNTER — HOSPITAL ENCOUNTER (OUTPATIENT)
Dept: GASTROENTEROLOGY | Facility: HOSPITAL | Age: 62
Setting detail: OUTPATIENT SURGERY
Discharge: HOME | End: 2022-07-13
Attending: INTERNAL MEDICINE | Admitting: INTERNAL MEDICINE
Payer: COMMERCIAL

## 2022-07-13 ENCOUNTER — ANESTHESIA EVENT (OUTPATIENT)
Dept: GASTROENTEROLOGY | Facility: HOSPITAL | Age: 62
End: 2022-07-13

## 2022-07-13 VITALS
OXYGEN SATURATION: 94 % | DIASTOLIC BLOOD PRESSURE: 56 MMHG | TEMPERATURE: 96.2 F | HEART RATE: 61 BPM | SYSTOLIC BLOOD PRESSURE: 145 MMHG | RESPIRATION RATE: 16 BRPM

## 2022-07-13 DIAGNOSIS — R10.84 GENERALIZED ABDOMINAL PAIN: ICD-10-CM

## 2022-07-13 PROCEDURE — 88305 TISSUE EXAM BY PATHOLOGIST: CPT | Performed by: PATHOLOGY

## 2022-07-13 PROCEDURE — 45380 COLONOSCOPY AND BIOPSY: CPT | Performed by: INTERNAL MEDICINE

## 2022-07-13 RX ORDER — PROPOFOL 10 MG/ML
INJECTION, EMULSION INTRAVENOUS AS NEEDED
Status: DISCONTINUED | OUTPATIENT
Start: 2022-07-13 | End: 2022-07-13

## 2022-07-13 RX ORDER — LIDOCAINE HYDROCHLORIDE 10 MG/ML
INJECTION, SOLUTION EPIDURAL; INFILTRATION; INTRACAUDAL; PERINEURAL AS NEEDED
Status: DISCONTINUED | OUTPATIENT
Start: 2022-07-13 | End: 2022-07-13

## 2022-07-13 RX ORDER — SODIUM CHLORIDE 9 MG/ML
INJECTION, SOLUTION INTRAVENOUS CONTINUOUS PRN
Status: DISCONTINUED | OUTPATIENT
Start: 2022-07-13 | End: 2022-07-13

## 2022-07-13 RX ORDER — PROPOFOL 10 MG/ML
INJECTION, EMULSION INTRAVENOUS CONTINUOUS PRN
Status: DISCONTINUED | OUTPATIENT
Start: 2022-07-13 | End: 2022-07-13

## 2022-07-13 RX ADMIN — PROPOFOL 20 MG: 10 INJECTION, EMULSION INTRAVENOUS at 11:13

## 2022-07-13 RX ADMIN — PROPOFOL 30 MG: 10 INJECTION, EMULSION INTRAVENOUS at 11:09

## 2022-07-13 RX ADMIN — PROPOFOL 20 MG: 10 INJECTION, EMULSION INTRAVENOUS at 11:14

## 2022-07-13 RX ADMIN — SODIUM CHLORIDE: 9 INJECTION, SOLUTION INTRAVENOUS at 10:54

## 2022-07-13 RX ADMIN — PROPOFOL 120 MCG/KG/MIN: 10 INJECTION, EMULSION INTRAVENOUS at 11:07

## 2022-07-13 RX ADMIN — LIDOCAINE HYDROCHLORIDE 50 MG: 10 INJECTION, SOLUTION EPIDURAL; INFILTRATION; INTRACAUDAL at 11:07

## 2022-07-13 RX ADMIN — PROPOFOL 20 MG: 10 INJECTION, EMULSION INTRAVENOUS at 11:16

## 2022-07-13 RX ADMIN — PROPOFOL 50 MG: 10 INJECTION, EMULSION INTRAVENOUS at 11:07

## 2022-07-13 NOTE — ANESTHESIA PREPROCEDURE EVALUATION
Procedure:  COLONOSCOPY    Relevant Problems   CARDIO   (+) Angina pectoris (HCC)   (+) Benign essential hypertension   (+) WELLINGTON (dyspnea on exertion)   (+) Essential hypertension   (+) Familial hyperlipidemia   (+) Hyperlipidemia   (+) Hypertensive heart disease with congestive heart failure (HCC)   (+) Migraines   (+) Other chest pain   (+) Paroxysmal atrial fibrillation (HCC)      GI/HEPATIC   (+) Acute gastric ulcer without hemorrhage or perforation   (+) Gastroesophageal reflux disease without esophagitis      /RENAL   (+) Acute pyelonephritis      MUSCULOSKELETAL   (+) Arthritis   (+) Chronic low back pain   (+) Patellar tendinitis of left knee      NEURO/PSYCH   (+) Anxiety   (+) Anxiety and depression   (+) Chronic daily headache   (+) Chronic low back pain   (+) Continuous opioid dependence (HCC)   (+) Migraines      PULMONARY   (+) Acute upper respiratory infection   (+) Asthma due to seasonal allergies   (+) COPD (chronic obstructive pulmonary disease) (HCC)   (+) WELLINGTON (dyspnea on exertion)   (+) Moderate persistent asthma without complication   (+) CAMILLE (obstructive sleep apnea)   (+) Shortness of breath        Physical Exam    Airway    Mallampati score: II  TM Distance: <3 FB  Neck ROM: limited     Dental   No notable dental hx     Cardiovascular  Cardiovascular exam normal    Pulmonary  Pulmonary exam normal     Other Findings        Anesthesia Plan  ASA Score- 3     Anesthesia Type- IV sedation with anesthesia with ASA Monitors  Additional Monitors:   Airway Plan:     Comment: Stable from cardiac standpoint as per cardiologist note  Eval for SMA stent patency due to abdominal pain  No issues with previous anesthetics          Plan Factors-Exercise tolerance (METS): >4 METS  Chart reviewed  EKG reviewed  Imaging results reviewed  Existing labs reviewed  Patient summary reviewed  Patient is not a current smoker  Induction- intravenous      Postoperative Plan-     Informed Consent- Anesthetic plan and risks discussed with patient  I personally reviewed this patient with the CRNA  Discussed and agreed on the Anesthesia Plan with the NELSON Bell

## 2022-07-13 NOTE — ANESTHESIA POSTPROCEDURE EVALUATION
Post-Op Assessment Note    CV Status:  Stable  Pain Score: 0    Pain management: adequate     Mental Status:  Arousable   Hydration Status:  Euvolemic   PONV Controlled:  Controlled   Airway Patency:  Patent      Post Op Vitals Reviewed: Yes      Staff: CRNA         No complications documented      BP   121/60   Temp   96 7F   Pulse  61   Resp   16   SpO2   98% 6L FM

## 2022-07-13 NOTE — H&P
History and Physical -  [facility]  Malcolm Ace 57 y o  female MRN: [id number]    HPI: Malcolm Ace is a 57y o  year old female w hx of IBS-C (on miralax, dulcolax), a fib on apixaban, appendectomy 5/3/22, mesenteric artery thrombosis s/p SMA stent 8/2021, moderate sigmoid diverticulosis found on colonoscopy 5/22/19 presenting for colonoscopy for evaluation of abdominal pain  Pt has had persistent RLQ pain, and vascular surgery advised her to undergo repeat colonoscopy sooner than previously planned  Pain is variable in trigger and location  Pt's apixaban held for prior 2 days  Review of Systems    Historical Information   Past Medical History:   Diagnosis Date    A-fib [facility]) 03/2020    Allergic     Anxiety     Arthritis     Asthma     Colon polyp     Depression     GERD (gastroesophageal reflux disease)     Heart murmur     Hives     Hyperlipidemia     Hypertension     Infertility, female     Migraine     Palpitations     Psychiatric disorder     Wears glasses      Past Surgical History:   Procedure Laterality Date    COLONOSCOPY      EGD      EXPLORATORY LAPAROTOMY      for infertility    HAND SURGERY      Hand excision of tendon cyst    PA LAP,APPENDECTOMY N/A 5/3/2022    Procedure: APPENDECTOMY LAPAROSCOPIC;  Surgeon:  Calvin De Luna MD;  Location: [facility];  Service: [facility] [employer]      WISDOM TOOTH EXTRACTION       Social History   Social History     Substance and Sexual Activity   Alcohol Use No     Social History     Substance and Sexual Activity   Drug Use No     Social History     Tobacco Use   Smoking Status Former Smoker    Packs/day: 0 50    Years: 10 00    Pack years: 5 00    Quit date: 2019    Years since quitting: 3 5   Smokeless Tobacco Never Used     Family History   Problem Relation Age of Onset   [facility] Lung cancer Mother    [facility] Brain cancer Mother     Diabetes Father     Depression Father     Other Father         septic     Allergies Sister     Hashimoto's thyroiditis Sister     Abdominal aortic aneurysm Sister     Diabetes Sister     No Known Problems Sister     Hodgkin's lymphoma Brother     No Known Problems Brother     Diabetes Other        Meds/Allergies     (Not in a hospital admission)      Allergies   Allergen Reactions    Ace Inhibitors Angioedema and Anaphylaxis     Anaphylaxis    Hydralazine Other (See Comments)     Lip swelling  Flank pain    Other Anaphylaxis and Other (See Comments)     Other reaction(s): angioadema  Other reaction(s): Other (See Comments)  Preservatives- itching throat closes, hi  Other reaction(s): angioadema  E Z Cat - hives throat closes itching,  Preservatives- itching throat closes, hi  Artificial sweeteners    Valsartan Angioedema     Lips, face swollen    Ampicillin Hives     Depends on brand some preservatives can react    Benicar [Olmesartan] Angioedema     See Allergy note from 9/11/2008  Swollen ankles/legs    Sulfa Antibiotics Other (See Comments)     stuffiness,itching,hives,throat closing       Objective     LMP  (LMP Unknown)       PHYSICAL EXAM    Gen: NAD  CV: RRR  CHEST: Clear  ABD: soft, NT/ND  EXT: no edema  Neuro: AAO      ASSESSMENT/PLAN:  This is a 57y o  year old female here for abdominal pain, planned for colonoscopy      PLAN:   Procedure: Colonoscopy

## 2022-07-18 ENCOUNTER — NURSE TRIAGE (OUTPATIENT)
Dept: OTHER | Facility: OTHER | Age: 62
End: 2022-07-18

## 2022-07-18 NOTE — TELEPHONE ENCOUNTER
Pt called in that since her colonoscopy on 7/13/22 she has been very tried, has not had a BM, /57  Please call her back to advise

## 2022-07-18 NOTE — TELEPHONE ENCOUNTER
Called the pt, who says that since her procedure, she has been feeling very tired/weak with intermittent dizziness and hypotension  Pt says she is not dizzy today and believes her BP is a bit higher, but still is very tired and weak  She also notes that she has not yet had a BM since her procedure, but admits to not eating much due to being tired  Pt is passing gas well  Pt says she had some R sided pain last night but believes this is due to now moving her bowels  I explained to pt that her lack of PO intake + completing a bowel prep is likely the reason she is not moving her bowels  I advised her to take miralax up to BID and can even add colace BID, however I advised pt to make sure she eats frequently throughout the day (bland diet for now)  I also explained to pt that if she finally eats and she still has weakness, or becomes presyncopal again or is hypotensive again, I would call her PCP and go to the ED  Thanks!     Dr Luigi DakinDwaine Chester for you as this is post-procedural

## 2022-07-18 NOTE — TELEPHONE ENCOUNTER
Regarding: left side pain  ----- Message from [facility] sent at 7/18/2022 12:22 PM EDT -----  "I have been experiencing fatigue and I have been nauseated  When I eat my left side hurts    This has been going on since my colonoscopy on 7/13/2022 "

## 2022-07-18 NOTE — TELEPHONE ENCOUNTER
Reason for Disposition   Caller has NON-URGENT question and triager unable to answer question    Answer Assessment - Initial Assessment Questions  1  SYMPTOM: "What's the main symptom you're concerned about?" (e g , pain, fever, vomiting)      fatigue  2  ONSET:      Since the colonoscopy  3  SURGERY: "What surgery was performed?"      Coloscopy  4  DATE of SURGERY: "When was surgery performed?"     7/13/2022  5  ANESTHESIA: " What type of anesthesia did you have?" (e g , general, spinal, epidural, local)      general  6  PAIN: "Is there any pain?" If Yes, ask: "How bad is it?"  (Scale 1-10; or mild, mode/rate, severe)      yes  7  FEVER: "Do you have a fever?" If Yes, ask: "What is your temperature, how was it measured, and when did it start?"     denies  8  VOMITING: "Is there any vomiting?" If yes, ask: "How many times?"     Denies vomiting - feels nauseous    10   OTHER SYMPTOMS: "Do you have any other symptoms?" (e g , drainage from wound, painful urination, constipation)       Has not had BM since 7/13/2022,   106/57 ( 56 )- usually in the 164'U Systolic    Protocols used: POST-OP SYMPTOMS AND QUESTIONS-ADULT-[place]

## 2022-07-19 NOTE — TELEPHONE ENCOUNTER
Spoke with pt and she states she is still feeling the same but denies dizziness  Pt feels as though she may have BM today   Her blood pressure is normal

## 2022-07-19 NOTE — TELEPHONE ENCOUNTER
Discussed with patient  She has been having pain (chronic) and weakness  Nothing found on colonoscopy  No fevers or chills  Tolerating food  Sent a message to Dr Rosalba Alonzo and Paulo Obregon PA-C to try to see the patient sooner

## 2022-07-20 ENCOUNTER — TELEPHONE (OUTPATIENT)
Dept: GASTROENTEROLOGY | Facility: CLINIC | Age: 62
End: 2022-07-20

## 2022-07-20 DIAGNOSIS — M79.602 LEFT ARM PAIN: ICD-10-CM

## 2022-07-20 RX ORDER — TRAMADOL HYDROCHLORIDE 50 MG/1
50 TABLET ORAL 3 TIMES DAILY PRN
Qty: 90 TABLET | Refills: 0 | Status: SHIPPED | OUTPATIENT
Start: 2022-07-20 | End: 2022-08-18 | Stop reason: SDUPTHER

## 2022-07-20 NOTE — TELEPHONE ENCOUNTER
[facility] contacted our office and they are wondering if it is ok to meri tramadol one day earlier  Per Dr Alexandra Root this is ok   [facility] was contacted and they will get the RX ready for patient

## 2022-07-25 ENCOUNTER — TELEPHONE (OUTPATIENT)
Dept: GASTROENTEROLOGY | Facility: CLINIC | Age: 62
End: 2022-07-25

## 2022-07-25 NOTE — TELEPHONE ENCOUNTER
Spoke with pt  Confirmed 8/3 appt  Pt will wait to discuss plan moving forward at the appt  Reporting stomach pain  Reviewed medications with pt  Taking Dexilant 60 mg daily in the am, taking omeprazole 40 mg daily in the pm, Carafate 1 g    Pt verbalized understanding of all information  Agreeable to continuing medications as prescribed until appt

## 2022-07-25 NOTE — TELEPHONE ENCOUNTER
I don't see any mention of her needing a CTA & unfortunately Dr Natacha Webb is out of the office for 2 weeks  His last note mentioned an office appt to address her symptoms    Bess Steele

## 2022-07-25 NOTE — TELEPHONE ENCOUNTER
Patients GI provider:  Dr Pooja Brady    Number to return call: [phone]    Reason for call: Pt calling stating she spoke to Dr Pooja Brady after her procedure on 07/13 and Dr Pooja Brady suggested she get a CTA  Pt states she would like to get the CTA and wanted an order placed for her before her next sched appt      Scheduled procedure/appointment date if applicable: Appt - 09/18/53

## 2022-07-27 ENCOUNTER — TELEPHONE (OUTPATIENT)
Dept: CARDIOLOGY CLINIC | Facility: CLINIC | Age: 62
End: 2022-07-27

## 2022-07-27 ENCOUNTER — OFFICE VISIT (OUTPATIENT)
Dept: FAMILY MEDICINE CLINIC | Facility: CLINIC | Age: 62
End: 2022-07-27
Payer: COMMERCIAL

## 2022-07-27 VITALS
HEART RATE: 69 BPM | TEMPERATURE: 97.1 F | HEIGHT: 64 IN | BODY MASS INDEX: 34.69 KG/M2 | DIASTOLIC BLOOD PRESSURE: 80 MMHG | WEIGHT: 203.2 LBS | SYSTOLIC BLOOD PRESSURE: 132 MMHG | OXYGEN SATURATION: 96 %

## 2022-07-27 DIAGNOSIS — Z00.00 WELL ADULT EXAM: Primary | ICD-10-CM

## 2022-07-27 DIAGNOSIS — J30.1 SEASONAL ALLERGIC RHINITIS DUE TO POLLEN: ICD-10-CM

## 2022-07-27 PROCEDURE — 99396 PREV VISIT EST AGE 40-64: CPT | Performed by: FAMILY MEDICINE

## 2022-07-27 RX ORDER — MONTELUKAST SODIUM 10 MG/1
10 TABLET ORAL
Qty: 90 TABLET | Refills: 0 | Status: SHIPPED | OUTPATIENT
Start: 2022-07-27 | End: 2022-10-24

## 2022-07-27 RX ADMIN — CYANOCOBALAMIN 1000 MCG: 1000 INJECTION, SOLUTION INTRAMUSCULAR; SUBCUTANEOUS at 09:28

## 2022-07-27 NOTE — PROGRESS NOTES
Assessment/Plan:  Patient will go for mammogram   Patient follow-up with gynecology appropriately  Colonoscopy up-to-date  Labs up-to-date  Patient refusing vaccines  Follow-up in 3 months       Diagnoses and all orders for this visit:    Well adult exam    Seasonal allergic rhinitis due to pollen  -     montelukast (SINGULAIR) 10 mg tablet; Take 1 tablet (10 mg total) by mouth daily at bedtime            Subjective:        Patient ID: Abdirizak Burdick is a 57 y o  female  Patient is here for wellness exam   Labs and vaccines reviewed  Patient due for mammogram   Patient up-to-date with colonoscopy July 2022  Patient sees Gynecology routinely  Patient with UTI  Patient cultured yesterday with Urology  Flank Pain  Associated symptoms include dysuria  Urinary Tract Infection   Associated symptoms include flank pain  The following portions of the patient's history were reviewed and updated as appropriate: allergies, current medications, past family history, past medical history, past social history, past surgical history and problem list       Review of Systems   Constitutional: Negative  HENT: Negative  Eyes: Negative  Respiratory: Negative  Cardiovascular: Negative  Gastrointestinal: Negative  Endocrine: Negative  Genitourinary: Positive for dysuria and flank pain  Skin: Negative  Allergic/Immunologic: Negative  Neurological: Negative  Hematological: Negative  Psychiatric/Behavioral: Negative  Objective:               /80 (BP Location: Right arm, Patient Position: Sitting, Cuff Size: Large)   Pulse 69   Temp (!) 97 1 °F (36 2 °C) (Tympanic)   Ht 5' 4" (1 626 m)   Wt 92 2 kg (203 lb 3 2 oz)   LMP  (LMP Unknown)   SpO2 96%   BMI 34 88 kg/m²          Physical Exam  Vitals and nursing note reviewed  Constitutional:       General: She is not in acute distress  Appearance: Normal appearance   She is not ill-appearing, toxic-appearing or diaphoretic  HENT:      Head: Normocephalic and atraumatic  Right Ear: Tympanic membrane, ear canal and external ear normal  There is no impacted cerumen  Left Ear: Tympanic membrane, ear canal and external ear normal  There is no impacted cerumen  Nose: Nose normal  No congestion or rhinorrhea  Mouth/Throat:      Mouth: Mucous membranes are moist       Pharynx: No oropharyngeal exudate or posterior oropharyngeal erythema  Eyes:      General: No scleral icterus  Right eye: No discharge  Left eye: No discharge  Extraocular Movements: Extraocular movements intact  Conjunctiva/sclera: Conjunctivae normal       Pupils: Pupils are equal, round, and reactive to light  Neck:      Vascular: No carotid bruit  Cardiovascular:      Rate and Rhythm: Normal rate and regular rhythm  Pulses: Normal pulses  Heart sounds: Normal heart sounds  No murmur heard  No friction rub  No gallop  Pulmonary:      Effort: Pulmonary effort is normal  No respiratory distress  Breath sounds: Normal breath sounds  No stridor  No wheezing, rhonchi or rales  Chest:      Chest wall: No tenderness  Musculoskeletal:         General: No swelling, tenderness, deformity or signs of injury  Normal range of motion  Cervical back: Normal range of motion and neck supple  No rigidity  No muscular tenderness  Right lower leg: No edema  Left lower leg: No edema  Lymphadenopathy:      Cervical: No cervical adenopathy  Skin:     General: Skin is warm and dry  Capillary Refill: Capillary refill takes less than 2 seconds  Coloration: Skin is not jaundiced  Findings: No bruising, erythema, lesion or rash  Neurological:      Mental Status: She is alert and oriented to person, place, and time  Mental status is at baseline  Cranial Nerves: No cranial nerve deficit  Sensory: No sensory deficit  Motor: No weakness        Coordination: Coordination normal       Gait: Gait normal    Psychiatric:         Mood and Affect: Mood normal          Behavior: Behavior normal          Thought Content:  Thought content normal          Judgment: Judgment normal

## 2022-07-27 NOTE — TELEPHONE ENCOUNTER
Pt called having routine dental cleaning concerned about Eliquis  told usually it is not held unless dentist is concerned then they need to contact office with medication hold request if needed  She understood

## 2022-07-30 DIAGNOSIS — R22.9 SUBCUTANEOUS NODULE: ICD-10-CM

## 2022-08-01 RX ORDER — ONDANSETRON 4 MG/1
4 TABLET, ORALLY DISINTEGRATING ORAL EVERY 6 HOURS PRN
Qty: 60 TABLET | Refills: 0 | Status: SHIPPED | OUTPATIENT
Start: 2022-08-01 | End: 2022-09-07 | Stop reason: SDUPTHER

## 2022-08-02 NOTE — PROGRESS NOTES
Assessment/Plan:    Left upper quadrant abdominal pain  Patient present to follow up  Patient states she is experiencing persistent left flank pain which radiates around to the back  Patient states the pain is constant  Patient also states she has constant nausea  Patient states her colonoscopy was unrevealing  We have discussed at length that her symptoms are not consistent with mesenteric ischemia  Again, the CTA performed 4/22 shows the stent to be patent as well as a widely patent celiac and RAMBO  Velocities within the stent show a maximal peak systolic velocity of 315 centimeters/second  Additionally, when she presented to the [facility] with a thrombosed SMA and underwent successful SMA stenting her pain was never alleviated  The stent will require continued surveillance  Repeat surveillance duplex will be performed in 3 months  It should be followed on a biannual basis  Patient is currently taking Eliquis  Diagnoses and all orders for this visit:    Pain of upper abdomen  -     VAS celiac and/or mesenteric duplex; complete study; Future        Subjective:      Patient ID: Quinn Delatorre is a 57 y o  female  Patient present to follow up  Patient states she is experiencing persistent left flank pain which radiates around to the back  Patient states the pain is constant  Patient also states she has constant nausea  Patient states her colonoscopy was unrevealing  We have discussed at length that her symptoms are not consistent with mesenteric ischemia  Again, the CTA performed 4/22 shows the stent to be patent as well as a widely patent celiac and RAMBO  Velocities within the stent show a maximal peak systolic velocity of 522 centimeters/second  Additionally, when she presented to the [facility] with a thrombosed SMA and underwent successful SMA stenting her pain was never alleviated  The stent will require continued surveillance  Repeat surveillance duplex will be performed in 3 months    It should be followed on a biannual basis  Patient is currently taking Eliquis  45 minutes was spent with the patient in Education in consultation  Previous visit:  57y o  year old female being seen today with multiple pain complaints  Pt presented to [facility] in August 2021 with complaints of persistent post prandial abdominal pain  Workup with CT 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 and she underwent visceral arteriogram with SMA stent placement on 8/19/21  She was last seen at [facility] in October and at that time was experiencing persistent abdominal pain  She was referred to GI and underwent endoscopy with upcoming colonoscopy in the near future  She has also underwent an appendectomy in May  Today she has multiple complaints and reports persistent left sided abdominal pain which is worse after eating  However when questioning her further on her abdominal pain she states the left flank pain is constant and she can feel a lump at her left flank and states bruising comes and goes  The patient is morbidly obese and has reportedly has gained weight  Additionally, a CTA was performed in May of this year which shows the SMA stent to be patent  Patient underwent a duplex study which showed somewhat elevated velocities of 260 cm per second  She also reports nausea and constipation which she takes miralax and zofran  She has tried to change her diet, but notices no differences  She also suffers from interstitial cystitis  In addition, she is currently receiving physical therapy for back pain  She is currently on eliquis for afib which is managed by her cardiologist  She has an upcoming appt for evaluation of palpitations  She never began taking ASA after she completed her course of plavix post stent placement  She reports recent headaches with pulsatile tinnitus of the left ear which she describes as a "whooshing" noise   She is also under stress as she was terminated from her job in September and has not found anything since due to her health concerns              The following portions of the patient's history were reviewed and updated as appropriate: allergies, current medications, past family history, past medical history, past social history, past surgical history and problem list     Review of Systems   Constitutional: Negative  Negative for chills and fever  HENT: Negative  Negative for ear pain and sore throat  Eyes: Negative  Negative for pain and visual disturbance  Respiratory: Negative  Negative for cough and shortness of breath  Cardiovascular: Negative  Negative for chest pain and palpitations  Gastrointestinal: Positive for nausea  Negative for abdominal pain and vomiting  Endocrine: Negative  Genitourinary: Negative  Negative for dysuria and hematuria  Musculoskeletal: Negative  Negative for arthralgias and back pain  Skin: Negative  Negative for color change and rash  Allergic/Immunologic: Negative  Neurological: Negative  Negative for seizures and syncope  Hematological: Negative  Psychiatric/Behavioral: Negative  All other systems reviewed and are negative  Objective:      /68 (BP Location: Left arm, Patient Position: Sitting, Cuff Size: Adult)   Pulse 73   Ht 5' 4" (1 626 m)   Wt 90 9 kg (200 lb 6 4 oz)   LMP  (LMP Unknown)   BMI 34 40 kg/m²          Physical Exam  Vitals and nursing note reviewed  Constitutional:       Appearance: She is well-developed  HENT:      Head: Normocephalic and atraumatic  Eyes:      Conjunctiva/sclera: Conjunctivae normal       Pupils: Pupils are equal, round, and reactive to light  Neck:      Vascular: No JVD  Cardiovascular:      Rate and Rhythm: Normal rate and regular rhythm  Heart sounds: Normal heart sounds  Pulmonary:      Effort: Pulmonary effort is normal  No respiratory distress  Breath sounds: Normal breath sounds  No stridor  No wheezing or rales  Chest:      Chest wall: No tenderness  Abdominal:      General: There is no distension  Palpations: Abdomen is soft  There is no mass  Tenderness: There is no abdominal tenderness  There is no guarding or rebound  Musculoskeletal:         General: No tenderness or deformity  Normal range of motion  Cervical back: Normal range of motion and neck supple  Skin:     General: Skin is warm and dry  Findings: No erythema or rash  Neurological:      Mental Status: She is alert and oriented to person, place, and time  Psychiatric:         Behavior: Behavior normal          Thought Content:  Thought content normal

## 2022-08-03 ENCOUNTER — OFFICE VISIT (OUTPATIENT)
Dept: VASCULAR SURGERY | Facility: CLINIC | Age: 62
End: 2022-08-03
Payer: COMMERCIAL

## 2022-08-03 ENCOUNTER — OFFICE VISIT (OUTPATIENT)
Dept: GASTROENTEROLOGY | Facility: CLINIC | Age: 62
End: 2022-08-03
Payer: COMMERCIAL

## 2022-08-03 ENCOUNTER — DOCUMENTATION (OUTPATIENT)
Dept: GASTROENTEROLOGY | Facility: CLINIC | Age: 62
End: 2022-08-03

## 2022-08-03 VITALS
SYSTOLIC BLOOD PRESSURE: 132 MMHG | HEIGHT: 64 IN | BODY MASS INDEX: 34.21 KG/M2 | DIASTOLIC BLOOD PRESSURE: 68 MMHG | HEART RATE: 73 BPM | WEIGHT: 200.4 LBS

## 2022-08-03 VITALS
SYSTOLIC BLOOD PRESSURE: 122 MMHG | OXYGEN SATURATION: 99 % | HEIGHT: 64 IN | DIASTOLIC BLOOD PRESSURE: 68 MMHG | HEART RATE: 72 BPM | BODY MASS INDEX: 33.97 KG/M2 | TEMPERATURE: 98.9 F | WEIGHT: 199 LBS

## 2022-08-03 DIAGNOSIS — K21.9 GASTROESOPHAGEAL REFLUX DISEASE WITHOUT ESOPHAGITIS: ICD-10-CM

## 2022-08-03 DIAGNOSIS — R10.10 PAIN OF UPPER ABDOMEN: Primary | ICD-10-CM

## 2022-08-03 DIAGNOSIS — R10.9 CHRONIC ABDOMINAL PAIN: ICD-10-CM

## 2022-08-03 DIAGNOSIS — Z12.11 COLON CANCER SCREENING: ICD-10-CM

## 2022-08-03 DIAGNOSIS — K59.09 OTHER CONSTIPATION: Primary | ICD-10-CM

## 2022-08-03 DIAGNOSIS — G89.29 CHRONIC ABDOMINAL PAIN: ICD-10-CM

## 2022-08-03 DIAGNOSIS — K76.0 FATTY LIVER: ICD-10-CM

## 2022-08-03 PROBLEM — R10.12 LEFT UPPER QUADRANT ABDOMINAL PAIN: Status: ACTIVE | Noted: 2022-08-03

## 2022-08-03 PROCEDURE — 99213 OFFICE O/P EST LOW 20 MIN: CPT | Performed by: SURGERY

## 2022-08-03 PROCEDURE — 99214 OFFICE O/P EST MOD 30 MIN: CPT | Performed by: PHYSICIAN ASSISTANT

## 2022-08-03 RX ORDER — LINACLOTIDE 145 UG/1
145 CAPSULE, GELATIN COATED ORAL DAILY
Qty: 30 CAPSULE | Refills: 2 | Status: SHIPPED | OUTPATIENT
Start: 2022-08-03

## 2022-08-03 RX ORDER — NITROFURANTOIN 25; 75 MG/1; MG/1
CAPSULE ORAL
COMMUNITY
Start: 2022-07-28 | End: 2022-09-04

## 2022-08-03 RX ORDER — FLUCONAZOLE 150 MG/1
TABLET ORAL
COMMUNITY
Start: 2022-07-26 | End: 2022-09-04

## 2022-08-03 NOTE — ASSESSMENT & PLAN NOTE
Patient present to follow up  Patient states she is experiencing persistent left flank pain which radiates around to the back  Patient states the pain is constant  Patient also states she has constant nausea  Patient states her colonoscopy was unrevealing  We have discussed at length that her symptoms are not consistent with mesenteric ischemia  Again, the CTA performed 4/22 shows the stent to be patent as well as a widely patent celiac and RAMBO  Velocities within the stent show a maximal peak systolic velocity of 795 centimeters/second  Additionally, when she presented to the [facility] an with a thrombosed SMA and underwent successful SMA stenting her pain was never alleviated  The stent will require continued surveillance  Repeat surveillance duplex will be performed in 3 months  It should be followed on a biannual basis  Patient is currently taking Eliquis

## 2022-08-03 NOTE — LETTER
August 3, 2022     Jose Braun, [address] [address]    Patient: Iris Murphy   YOB: 1960   Date of Visit: 8/3/2022       Dear Dr Jairo Carney:    Thank you for referring Iris Murphy to me for evaluation  Below are the relevant portions of my assessment and plan of care  Patient present to follow up  Patient states she is experiencing persistent left flank pain which radiates around to the back  Patient states the pain is constant  Patient also states she has constant nausea  Patient states her colonoscopy was unrevealing  We have discussed at length that her symptoms are not consistent with mesenteric ischemia  Again, the CTA performed 4/22 shows the stent to be patent as well as a widely patent celiac and RAMBO  Velocities within the stent show a maximal peak systolic velocity of 169 centimeters/second  Additionally, when she presented to the [facility] an with a thrombosed SMA and underwent successful SMA stenting her pain was never alleviated  The stent will require continued surveillance  Repeat surveillance duplex will be performed in 3 months  It should be followed on a biannual basis  Patient is currently taking Eliquis  If you have questions, please do not hesitate to call me  I look forward to following Olga Sun along with you           Sincerely,        Ynes Jennings MD        CC: No Recipients

## 2022-08-03 NOTE — PROGRESS NOTES
[facility] - Outpatient Follow-up Note  Chinedu Conteh 57 y o  female MRN: [id number]  Encounter: [id number]      Assessment and Plan    1  Chronic abdominal pain  The patient presents for follow up of chronic lower abdominal/groin pain  Initially CT scan was obtained and revealing her appendix, which was located just left of the midline, appeared abnormally thickened without distension, appendicolith, or periappendiceal inflammation  She underwent appendectomy 5/3/22  She had continued pain so she saw her vascular team who recommended colonoscopy  Colonoscopy was negative for etiology of her pain  - possible related to constipation, mesenteric artery thrombosis, pelvic floor dysfunction   - bowel control as below  - follow up with vascular surgery as planned today  - start pelvic floor PT for abdominal pain, constipation, and interstitial cystitis      2  History of PUD  3  GERD  The patient has acid reflux which is currently well controlled on omeprazole 40 mg every morning and OTC pepcid nightly  Prior EGD 5/28/19 revealed multiple superficial ulcers in the antrum with negative H pylori testing  Etiology thought to be NSAIDs  Repeat EGD 4/7/22 normal including esophogeal biopsies  - avoid NSAIDS   - continue daily omeprazole 40mg and OTC pepcid       4  Fatty liver  Seen on CT scan 11/29/21  Recent liver enzymes normal aside for an alk phos of 133  - routine monitoring of his CMP  - recommend healthy diet and routine exercise     5   IBS-C  6  Opioid dependence   The patient has a history of IBS-C and is currently on tramadol  She is s/p appendectomy 5/3 and is currently on daily miralax and stool softeners but admits to bristol stool type 2 on this  - limit constipation inducing medications   - ensure proper hydration and fiber   - start miralax 145mcg  - start pelvic floor PT for abdominal pain, constipation, and interstitial cystitis      7   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HCC)  S/p stent at [facility]  Recent duplex study normal with the stent in tact and patent  Inferior mesenteric artery unable to be visualized secondary to overlying bowel gas  The patient continues with vascular   - continue care with vascular      8  Colon cancer screening  Last colonoscopy 7/13/22 normal exam except for mild diverticulosis and small internal hemorrhoids  - high fiber diet with a goal of 30g daily  - repeat colonoscopy 2039 or sooner if clinically indicated    ______________________________________________________________________    History of Present Illness  Malcolm Ace is a 57 y o  female here for follow up evaluation of chronic abdominal pain  Initially CT scan was obtained and revealing her appendix, which was located just left of the midline, appeared abnormally thickened without distension, appendicolith, or periappendiceal inflammation  She underwent appendectomy 5/3/22  She had continued pain so she saw her vascular team who recommended colonoscopy  Colonoscopy was negative for etiology of her pain  The patient presents today with continued complaints of lower abdominal pain, constipation despite miralax and dulcolax, nausea, and urinary symptoms  Review of Systems   Constitutional: Positive for fatigue  Negative for activity change, appetite change, chills, fever and unexpected weight change         Past Medical History  Past Medical History:   Diagnosis Date    A-fib [facility]) 03/2020    Allergic     Anxiety     Arthritis     Asthma     Colon polyp     Depression     GERD (gastroesophageal reflux disease)     Heart murmur     Hives     Hyperlipidemia     Hypertension     Infertility, female     Migraine     Palpitations     Psychiatric disorder     Wears glasses        Past Social history  Past Surgical History:   Procedure Laterality Date    COLONOSCOPY      EGD      EXPLORATORY LAPAROTOMY      for infertility    HAND SURGERY      Hand excision of tendon cyst    NC LAP,APPENDECTOMY N/A 5/3/2022    Procedure: APPENDECTOMY LAPAROSCOPIC;  Surgeon:  Hernan Cabrera MD;  [facility];  Service: General    SUPERIOR MESTENTERIC ARTERY STENT      WISDOM TOOTH EXTRACTION       Social History     Socioeconomic History    Marital status:      Spouse name: Not on file    Number of children: 0    Years of education: Not on file    Highest education level: Not on file   Occupational History    Occupation: unemployed   Tobacco Use    Smoking status: Former Smoker     Packs/day: 0 50     Years: 10 00     Pack years: 5 00     Quit date: 2019     Years since quitting: 3 5    Smokeless tobacco: Never Used   Vaping Use    Vaping Use: Never used   Substance and Sexual Activity    Alcohol use: No    Drug use: No    Sexual activity: Not Currently   Other Topics Concern    Not on file   Social History Narrative    Who lives in your home: Alone     What type of home do you live in: [address]     Age of your home: 1952 built     How long have you been living there: 5 yrs     Type of heat: forced hot air     Type of fuel: electric     What type of jamin is in your bedroom: carpet jamin     Do you have the following in or near your home:    Air products: Humidifier in the bedroom/kitchen     Pests: none     Pets: none     Are pets allowed in bedroom: N/A     Open fields, wooded areas nearby: No     Basement: [id number]-[id number]-no mold     Exposure to second hand smoke: No    Central air     Habits:    Caffeine: Hot tea 1 cup daily- ice tea 1 cup in the afternoon    Chocolate: Occasionally      Social Determinants of Health     Financial Resource Strain: Not on file   Food Insecurity: Not on file   Transportation Needs: Not on file   Physical Activity: Not on file   Stress: Not on file   Social Connections: Not on file   Intimate Partner Violence: Not on file   Housing Stability: Not on file     Social History     Substance and Sexual Activity   Alcohol Use No Social History     Substance and Sexual Activity   Drug Use No     Social History     Tobacco Use   Smoking Status Former Smoker    Packs/day: 0 50    Years: 10 00    Pack years: 5 00    Quit date: 2019    Years since quitting: 3 5   Smokeless Tobacco Never Used       Past Family History  Family History   Problem Relation Age of Onset    Lung cancer Mother    Najma Leisure Brain cancer Mother     Diabetes Father     Depression Father     Other Father         septic     Allergies Sister     Hashimoto's thyroiditis Sister     Abdominal aortic aneurysm Sister     Diabetes Sister     No Known Problems Sister     Hodgkin's lymphoma Brother     No Known Problems Brother     Diabetes Other        Current Medications  Current Outpatient Medications   Medication Sig Dispense Refill    acetaminophen (TYLENOL) 325 mg tablet Take 650 mg by mouth as needed for mild pain      apixaban (Eliquis) 5 mg Take 1 tablet (5 mg total) by mouth 2 (two) times a day 180 tablet 5    ascorbic acid (VITAMIN C) 1000 MG tablet Take 1,000 mg by mouth daily      azelastine (OPTIVAR) 0 05 % ophthalmic solution Administer 1 drop to both eyes 2 (two) times a day 6 mL 12    azelastine (OPTIVAR) 0 05 % ophthalmic solution Administer 1 drop to both eyes 2 (two) times a day 6 mL 12    butalbital-acetaminophen-caffeine (FIORICET,ESGIC) -40 mg per tablet Take 1 tablet by mouth every 4 (four) hours as needed for headaches 30 tablet 0    Carafate 1 GM/10ML suspension Take 10 mL (1 g total) by mouth 4 (four) times a day 3600 mL 1    cephalexin (KEFLEX) 250 mg capsule 500 mg      cetirizine (ZyrTEC) 10 mg tablet Take 1 tablet (10 mg total) by mouth daily for 365 doses 30 tablet 11    chlordiazepoxide-clidinium (LIBRAX) 5-2 5 mg per capsule Take 1 capsule by mouth 2 (two) times a day before meals 60 capsule 5    chlorhexidine (PERIDEX) 0 12 % solution       Cholecalciferol 25 MCG (1000 UT) tablet Take 1,000 Units by mouth daily      clotrimazole-betamethasone (LOTRISONE) 1-0 05 % cream       dexlansoprazole (DEXILANT) 60 MG capsule Take 1 capsule (60 mg total) by mouth daily Please take on an empty stomach 30 minutes before eating 30 capsule 3    diltiazem (CARDIZEM) 120 MG tablet Take 120 mg by mouth if needed (For heart palpitations)      ELDERBERRY PO Take by mouth daily   (Patient not taking: No sig reported)      Evolocumab 140 MG/ML SOAJ Inject 1 mL (140 mg total) under the skin every 14 (fourteen) days 2 mL 5    fexofenadine (ALLEGRA) 180 MG tablet Take 1 tablet (180 mg total) by mouth daily IN AM 30 tablet 12    fluconazole (DIFLUCAN) 150 mg tablet       fluticasone-vilanterol (BREO ELLIPTA) 200-25 MCG/INH inhaler Inhale 1 puff daily Rinse mouth after use   3 Inhaler 3    furosemide (LASIX) 40 mg tablet Take 1 tablet (40 mg total) by mouth daily 90 tablet 3    Ginger, Zingiber officinalis, (GINGER PO) Take 1 tablet by mouth in the morning      Heparin Sodium, Porcine, (heparin, porcine,) 08735 UNIT/ML       hydrocortisone-pramoxine (PROCTOFOAM-HC) 1-1 % FOAM rectal foam Insert 1 applicator into the rectum 2 (two) times a day 10 g 2    ipratropium (ATROVENT) 0 06 % nasal spray       ketoconazole (NIZORAL) 2 % cream Apply topically daily 60 g 2    LORazepam (ATIVAN) 0 5 mg tablet Take 1 tablet (0 5 mg total) by mouth every 8 (eight) hours as needed for anxiety 90 tablet 0    Meth-Hyo-M Bl-Na Phos-Ph Sal (Uribel) 118 MG CAPS Take 1 each by mouth Three times a day      metoprolol succinate (TOPROL-XL) 100 mg 24 hr tablet Take 1 tablet (100 mg total) by mouth 2 (two) times a day 180 tablet 2    montelukast (SINGULAIR) 10 mg tablet Take 1 tablet (10 mg total) by mouth daily at bedtime 90 tablet 0    MULTIPLE VITAMIN PO Take by mouth      Na Sulfate-K Sulfate-Mg Sulf (Suprep Bowel Prep Kit) 17 5-3 13-1 6 GM/177ML SOLN Take 1 Bottle by mouth once for 1 dose 177 mL 0    naloxone (NARCAN) 4 mg/0 1 mL nasal spray Administer 1 spray into a nostril  If no response after 2-3 minutes, give another dose in the other nostril using a new spray  (Patient not taking: No sig reported) 1 each 1    nitrofurantoin (MACROBID) 100 mg capsule       nystatin (MYCOSTATIN) powder Apply topically three times a weekly to affected area 60 g 3    omeprazole (PriLOSEC) 40 MG capsule Take 1 capsule (40 mg total) by mouth daily 309 capsule 4    ondansetron (Zofran ODT) 4 mg disintegrating tablet Take 1 tablet (4 mg total) by mouth every 6 (six) hours as needed for nausea or vomiting 60 tablet 0    phenazopyridine (PYRIDIUM) 200 mg tablet Take 1 tablet (200 mg total) by mouth 3 (three) times a day with meals 10 tablet 0    pimecrolimus (ELIDEL) 1 % cream Apply topically to rash on eyelids and hands once or twice a daily   100 g 3    polyethylene glycol (GOLYTELY) 4000 mL solution Take 4,000 mL by mouth once for 1 dose Please follow instruction sheet from the office 4000 mL 0    Probiotic Product (PROBIOTIC-10 PO) Take 1 tablet by mouth in the morning      spironolactone (ALDACTONE) 25 mg tablet TAKE ONE TABLET BY MOUTH TWICE DAILY 180 tablet 0    sucralfate (CARAFATE) 1 g tablet TAKE ONE TABLET BY MOUTH FOUR TIMES DAILY 360 tablet 0    traMADol (ULTRAM) 50 mg tablet Take 1 tablet (50 mg total) by mouth 3 (three) times a day as needed for severe pain 90 tablet 0    triamcinolone (KENALOG) 0 1 % cream Apply topically 2 (two) times a day 30 g 1    triamcinolone (KENALOG) 0 1 % cream Apply topically 2 (two) times a day 453 6 g 0    triamcinolone acetonide (KENALOG-40) 40 mg/mL       Turmeric 500 MG CAPS       Zinc 100 MG TABS Take by mouth daily       Current Facility-Administered Medications   Medication Dose Route Frequency Provider Last Rate Last Admin    cyanocobalamin injection 1,000 mcg  1,000 mcg Intramuscular Q30 Days Ani Jaimes DO   1,000 mcg at 07/27/22 8497       Allergies  Allergies   Allergen Reactions    Ace Inhibitors Angioedema and Anaphylaxis     Anaphylaxis    Hydralazine Other (See Comments)     Lip swelling  Flank pain    Other Anaphylaxis and Other (See Comments)     Other reaction(s): angioadema  Other reaction(s): Other (See Comments)  Preservatives- itching throat closes, hi  Other reaction(s): angioadema  E Z Cat - hives throat closes itching,  Preservatives- itching throat closes, hi  Artificial sweeteners    Valsartan Angioedema     Lips, face swollen    Ampicillin Hives     Depends on brand some preservatives can react    Benicar [Olmesartan] Angioedema     See Allergy note from 9/11/2008  Swollen ankles/legs    Sulfa Antibiotics Other (See Comments)     stuffiness,itching,hives,throat closing         The following portions of the patient's history were reviewed and updated as appropriate: allergies, current medications, past medical history, past social history, past surgical history and problem list       Vitals  Vitals:    08/03/22 0824   BP: 122/68   BP Location: Right arm   Patient Position: Sitting   Cuff Size: Standard   Pulse: 72   Temp: 98 9 °F (37 2 °C)   TempSrc: Tympanic   SpO2: 99%   Weight: 90 3 kg (199 lb)   Height: 5' 4" (1 626 m)         Physical Exam  Constitutional   General appearance: Patient is seated and in no acute distress, well appearing and well nourished  Head and Face   Head and face: Normal     Eyes   Conjunctiva and lids: No erythema, swelling or discharge  Anicteric  Ears, Nose, Mouth, and Throat   Hearing: Normal     Neck: Supple, trachea midline  Pulmonary   Respiratory effort: No increased work of breathing or signs of respiratory distress  Cardiovascular    Examination of extremities for edema and/or varicosities: Normal     Musculoskeletal   Gait and station: Normal    Skin   Skin and subcutaneous tissue: Warm, dry, and intact  No visible jaundice, lesions or rashes    Psychiatric   Judgment and insight: Normal  Recent and remote memory:  Normal  Mood and affect: Normal      Results  No visits with results within 1 Day(s) from this visit  Latest known visit with results is:   Hospital Outpatient Visit on 07/13/2022   Component Date Value    Case Report 07/13/2022                      Value:Surgical Pathology Report                         Case: [id number]                                   Authorizing Provider:  Susan Bass MD             Collected:           07/13/2022 1120              Ordering Location:     [address] [place] [address]      Received:            07/13/2022 [address] Endoscopy                                                           Pathologist:           Cindy Smiley MD                                                    Specimen:    Colon                                                                                      Final Diagnosis 07/13/2022                      Value: This result contains rich text formatting which cannot be displayed here   Additional Information 07/13/2022                      Value: This result contains rich text formatting which cannot be displayed here  Karen Sweeney Gross Description 07/13/2022                      Value: This result contains rich text formatting which cannot be displayed here  Radiology Results  Colonoscopy    Result Date: 7/13/2022  Narrative: [address] [place] [address] Street: 7/13/22 PHYSICIAN(S): Attending: Susan Bass MD Fellow: Fam Donahue MD INDICATION: Generalized abdominal pain POST-OP DIAGNOSIS: See the impression below  HISTORY: Prior colonoscopy: 3 years ago  BOWEL PREPARATION: Miralax/Dulcolax PREPROCEDURE: Informed consent was obtained for the procedure, including sedation  Risks including but not limited to bleeding, infection, perforation, adverse drug reaction and aspiration were explained in detail   Also explained about less than 100% sensitivity with the exam and other alternatives  The patient was placed in the left lateral decubitus position  DETAILS OF PROCEDURE: Patient was taken to the procedure room where a time out was performed to confirm correct patient and correct procedure  The patient underwent monitored anesthesia care, which was administered by an anesthesia professional  The patient's blood pressure, heart rate, level of consciousness, oxygen and respirations were monitored throughout the procedure  A digital rectal exam was performed  The scope was introduced through the anus and advanced to the cecum  Retroflexion was performed in the rectum  The quality of bowel preparation was evaluated using the [place] Bowel Preparation Scale with scores of: right colon = 2, transverse colon = 2, left colon = 2  The total BBPS score was 6  Bowel prep was adequate  The patient experienced no blood loss  The procedure was not difficult  The patient tolerated the procedure well  There were no apparent complications  ANESTHESIA INFORMATION: ASA: III Anesthesia Type: IV Sedation with Anesthesia MEDICATIONS: No administrations occurring from 1053 to 1134 on 07/13/22 FINDINGS: Few small diverticula in the sigmoid colon Otherwise normal examination  Biopsies were done  Small, internal hemorrhoids EVENTS: Procedure Events Event Event Time ENDO CECUM REACHED 7/13/2022 11:18 AM ENDO SCOPE OUT TIME 7/13/2022 11:32 AM SPECIMENS: ID Type Source Tests Collected by Time Destination 1 :  Tissue Colon TISSUE EXAM Kerri Canales MD 7/13/2022 11:20 AM  EQUIPMENT: Colonoscope -[id number]     Impression: Normal exam except for mild diverticulosis  Biopsies were done  Hemorrhoids  RECOMMENDATION: Repeat screening colonoscopy in 10 years Await pathology results  ATTENDING ATTESTATION:  I was present throughout the entire procedure from insertion to complete withdrawal of the scope  I performed all interventions myself or oversaw the fellow     Kerri Canales MD       Orders  No orders of the defined types were placed in this encounter

## 2022-08-05 DIAGNOSIS — K21.9 GASTROESOPHAGEAL REFLUX DISEASE WITHOUT ESOPHAGITIS: Primary | ICD-10-CM

## 2022-08-08 DIAGNOSIS — F32.A ANXIETY AND DEPRESSION: ICD-10-CM

## 2022-08-08 DIAGNOSIS — F41.9 ANXIETY AND DEPRESSION: ICD-10-CM

## 2022-08-08 RX ORDER — LORAZEPAM 0.5 MG/1
0.5 TABLET ORAL EVERY 8 HOURS PRN
Qty: 90 TABLET | Refills: 0 | Status: SHIPPED | OUTPATIENT
Start: 2022-08-08 | End: 2022-10-06

## 2022-08-15 ENCOUNTER — EVALUATION (OUTPATIENT)
Dept: PHYSICAL THERAPY | Facility: CLINIC | Age: 62
End: 2022-08-15
Payer: COMMERCIAL

## 2022-08-15 ENCOUNTER — HOSPITAL ENCOUNTER (OUTPATIENT)
Dept: MAMMOGRAPHY | Facility: MEDICAL CENTER | Age: 62
Discharge: HOME | End: 2022-08-15
Payer: COMMERCIAL

## 2022-08-15 VITALS — WEIGHT: 200.4 LBS | HEIGHT: 64 IN | BODY MASS INDEX: 34.21 KG/M2

## 2022-08-15 DIAGNOSIS — Z12.31 SCREENING MAMMOGRAM FOR HIGH-RISK PATIENT: ICD-10-CM

## 2022-08-15 DIAGNOSIS — R10.13 EPIGASTRIC PAIN: Primary | ICD-10-CM

## 2022-08-15 PROCEDURE — 97162 PT EVAL MOD COMPLEX 30 MIN: CPT | Performed by: PHYSICAL THERAPIST

## 2022-08-15 PROCEDURE — 97112 NEUROMUSCULAR REEDUCATION: CPT | Performed by: PHYSICAL THERAPIST

## 2022-08-15 PROCEDURE — 77063 BREAST TOMOSYNTHESIS BI: CPT

## 2022-08-15 PROCEDURE — 77067 SCR MAMMO BI INCL CAD: CPT

## 2022-08-15 NOTE — PROGRESS NOTES
PT Evaluation     Today's date: 8/15/2022  Patient name: Kyara Clements  : 1960  MRN: [id number]  Referring provider: Teresa Muñiz, *  Dx:   Encounter Diagnosis     ICD-10-CM    1  Epigastric pain  R10 13                   Assessment  Assessment details: Patient is a 57 y o  female who presents to physical therapy with physician diagnosis of Epigastric pain  (primary encounter diagnosis)  Patient would benefit from skilled physical therapy intervention to address her impairments and to maximize function  Thank you for your referral and please feel free to contact me at [phone]  Goals  STG 2-4 weeks  Decrease pain by 50%  Patient will be independent with HEP  Plan  Patient would benefit from: skilled physical therapy  Frequency: 1x week  Duration in weeks: 4  Treatment plan discussed with: patient        Subjective Evaluation    History of Present Illness  Mechanism of injury: Patient underwent [address] surgery last August and since that time has been dealing with abdominal pain  She has discomfort with voiding  Voids daily  She takes Milax and stool softener as well  Patients goal is to remove left sided lower abdominal pain  Appendectomy on 5/3/22  Per Gastroenterology:  History of Present Illness  Kyara Clements is a 57 y o  female here for follow up evaluation of chronic abdominal pain  Initially CT scan was obtained and revealing her appendix, which was located just left of the midline, appeared abnormally thickened without distension, appendicolith, or periappendiceal inflammation   She underwent appendectomy 5/3/22  Solange Blakely had continued pain so she saw her vascular team who recommended colonoscopy  Colonoscopy was negative for etiology of her pain  The patient presents today with continued complaints of lower abdominal pain, constipation despite miralax and dulcolax, nausea, and urinary symptoms       Plan  Chronic abdominal pain  The patient presents for follow up of chronic lower abdominal/groin pain  Initially CT scan was obtained and revealing her appendix, which was located just left of the midline, appeared abnormally thickened without distension, appendicolith, or periappendiceal inflammation   She underwent appendectomy 5/3/22  Curtis Guzman had continued pain so she saw her vascular team who recommended colonoscopy  Colonoscopy was negative for etiology of her pain    - possible related to constipation, mesenteric artery thrombosis, pelvic floor dysfunction   - bowel control as below  - follow up with vascular surgery as planned today  - start pelvic floor PT for abdominal pain, constipation, and interstitial cystitis           Not a recurrent problem   Quality of life: good    Pain  Current pain ratin  At best pain ratin  At worst pain rating: 10  Quality: throbbing, discomfort, sharp, dull ache and cramping          Objective     Static Posture     Comments  Severe left mid abdomen pain to light touch  Diaphragmatic breathing - efficient   Poor pelvic floor NMC             Precautions: none      Manuals 8/15                                                                Neuro Re-Ed                          Diaphragmatic breathing ND            Self STM ND            kegels ND                                                   Ther Ex                                                                                                                     Ther Activity                                       Gait Training                                       Modalities

## 2022-08-18 ENCOUNTER — TELEPHONE (OUTPATIENT)
Dept: OTHER | Facility: OTHER | Age: 62
End: 2022-08-18

## 2022-08-18 DIAGNOSIS — M79.602 LEFT ARM PAIN: ICD-10-CM

## 2022-08-18 RX ORDER — TRAMADOL HYDROCHLORIDE 50 MG/1
50 TABLET ORAL 3 TIMES DAILY PRN
Qty: 90 TABLET | Refills: 0 | Status: SHIPPED | OUTPATIENT
Start: 2022-08-18 | End: 2022-09-08 | Stop reason: SDUPTHER

## 2022-08-22 ENCOUNTER — APPOINTMENT (OUTPATIENT)
Dept: PHYSICAL THERAPY | Facility: CLINIC | Age: 62
End: 2022-08-22
Payer: COMMERCIAL

## 2022-08-22 ENCOUNTER — TELEPHONE (OUTPATIENT)
Dept: FAMILY MEDICINE CLINIC | Facility: CLINIC | Age: 62
End: 2022-08-22

## 2022-08-22 DIAGNOSIS — N39.0 URINARY TRACT INFECTION WITHOUT HEMATURIA, SITE UNSPECIFIED: Primary | ICD-10-CM

## 2022-08-23 ENCOUNTER — APPOINTMENT (OUTPATIENT)
Dept: LAB | Facility: HOSPITAL | Age: 62
End: 2022-08-23
Payer: COMMERCIAL

## 2022-08-23 DIAGNOSIS — N39.0 URINARY TRACT INFECTION WITHOUT HEMATURIA, SITE UNSPECIFIED: ICD-10-CM

## 2022-08-23 LAB
BACTERIA UR QL AUTO: ABNORMAL /HPF
BILIRUB UR QL STRIP: NEGATIVE
CLARITY UR: ABNORMAL
COLOR UR: YELLOW
GLUCOSE UR STRIP-MCNC: NEGATIVE MG/DL
HGB UR QL STRIP.AUTO: ABNORMAL
KETONES UR STRIP-MCNC: NEGATIVE MG/DL
LEUKOCYTE ESTERASE UR QL STRIP: ABNORMAL
NITRITE UR QL STRIP: POSITIVE
NON-SQ EPI CELLS URNS QL MICRO: ABNORMAL /HPF
PH UR STRIP.AUTO: 6 [PH]
PROT UR STRIP-MCNC: NEGATIVE MG/DL
RBC #/AREA URNS AUTO: ABNORMAL /HPF
SP GR UR STRIP.AUTO: 1.01 (ref 1–1.03)
UROBILINOGEN UR QL STRIP.AUTO: 0.2 E.U./DL
WBC #/AREA URNS AUTO: ABNORMAL /HPF

## 2022-08-23 PROCEDURE — 87077 CULTURE AEROBIC IDENTIFY: CPT

## 2022-08-23 PROCEDURE — 87086 URINE CULTURE/COLONY COUNT: CPT

## 2022-08-23 PROCEDURE — 81001 URINALYSIS AUTO W/SCOPE: CPT

## 2022-08-23 PROCEDURE — 87186 SC STD MICRODIL/AGAR DIL: CPT

## 2022-08-24 ENCOUNTER — OFFICE VISIT (OUTPATIENT)
Dept: FAMILY MEDICINE CLINIC | Facility: CLINIC | Age: 62
End: 2022-08-24
Payer: COMMERCIAL

## 2022-08-24 VITALS
HEIGHT: 64 IN | TEMPERATURE: 97.9 F | SYSTOLIC BLOOD PRESSURE: 150 MMHG | WEIGHT: 201.2 LBS | DIASTOLIC BLOOD PRESSURE: 108 MMHG | OXYGEN SATURATION: 98 % | HEART RATE: 72 BPM | BODY MASS INDEX: 34.35 KG/M2

## 2022-08-24 DIAGNOSIS — N10 ACUTE PYELONEPHRITIS: ICD-10-CM

## 2022-08-24 DIAGNOSIS — N39.0 RECURRENT UTI: Primary | ICD-10-CM

## 2022-08-24 DIAGNOSIS — R10.10 PAIN OF UPPER ABDOMEN: ICD-10-CM

## 2022-08-24 DIAGNOSIS — N30.10 INTERSTITIAL CYSTITIS: ICD-10-CM

## 2022-08-24 PROCEDURE — 99214 OFFICE O/P EST MOD 30 MIN: CPT | Performed by: FAMILY MEDICINE

## 2022-08-24 PROCEDURE — 96372 THER/PROPH/DIAG INJ SC/IM: CPT | Performed by: FAMILY MEDICINE

## 2022-08-24 RX ADMIN — CYANOCOBALAMIN 1000 MCG: 1000 INJECTION, SOLUTION INTRAMUSCULAR; SUBCUTANEOUS at 14:12

## 2022-08-24 NOTE — PROGRESS NOTES
Assessment/Plan: previous records and laboratory studies is and urinalysis reviewed at this time  Patient go for CT scan of the kidneys and bladder with history of recurrent UTI  Will await UA C&S /culture report for treatment with antibiotics  Patient just completed Cipro, Macrobid and Keflex with recurrence  Patient will increase fluids and use tramadol as needed at this time  Antibiotics will be added once urine culture results known       Diagnoses and all orders for this visit:    Recurrent UTI  -     CT renal stone study abdomen pelvis wo contrast; Future    Acute pyelonephritis  -     CT renal stone study abdomen pelvis wo contrast; Future    Interstitial cystitis  -     CT renal stone study abdomen pelvis wo contrast; Future    Pain of upper abdomen  -     CT renal stone study abdomen pelvis wo contrast; Future            Subjective:        Patient ID: Giselle Maldonado is a 57 y o  female  Patient is here with low back pain, pelvic pain, urgency urinary issues recently  Patient did use tramadol  Patient with recent urinalysis which is consistent with UTI  Awaiting final culture report  No fever  Patient did have some chills  Patient with some right flank pain  Patient with dysuria and frequency  No gross hematuria  The following portions of the patient's history were reviewed and updated as appropriate: allergies, current medications, past family history, past medical history, past social history, past surgical history and problem list       Review of Systems   Constitutional: Negative  HENT: Negative  Eyes: Negative  Respiratory: Negative  Cardiovascular: Negative  Gastrointestinal: Positive for abdominal pain  Endocrine: Negative  Genitourinary: Positive for dysuria, flank pain, frequency, pelvic pain and urgency  Negative for hematuria  Musculoskeletal: Positive for back pain  Skin: Negative  Allergic/Immunologic: Negative  Neurological: Negative  Hematological: Negative  Psychiatric/Behavioral: Negative  Objective:               BP (!) 150/108 (BP Location: Right arm, Patient Position: Sitting, Cuff Size: Standard)   Pulse 72   Temp 97 9 °F (36 6 °C) (Temporal)   Ht 5' 4" (1 626 m)   Wt 91 3 kg (201 lb 3 2 oz)   LMP  (LMP Unknown)   SpO2 98%   BMI 34 54 kg/m²          Physical Exam  Vitals and nursing note reviewed  Constitutional:       General: She is not in acute distress  Appearance: Normal appearance  She is not ill-appearing, toxic-appearing or diaphoretic  HENT:      Head: Normocephalic and atraumatic  Right Ear: Tympanic membrane, ear canal and external ear normal  There is no impacted cerumen  Left Ear: Tympanic membrane, ear canal and external ear normal  There is no impacted cerumen  Nose: Nose normal  No congestion or rhinorrhea  Mouth/Throat:      Mouth: Mucous membranes are moist       Pharynx: No oropharyngeal exudate or posterior oropharyngeal erythema  Eyes:      General: No scleral icterus  Right eye: No discharge  Left eye: No discharge  Extraocular Movements: Extraocular movements intact  Conjunctiva/sclera: Conjunctivae normal       Pupils: Pupils are equal, round, and reactive to light  Neck:      Vascular: No carotid bruit  Cardiovascular:      Rate and Rhythm: Normal rate and regular rhythm  Pulses: Normal pulses  Heart sounds: Normal heart sounds  No murmur heard  No friction rub  No gallop  Pulmonary:      Effort: Pulmonary effort is normal  No respiratory distress  Breath sounds: Normal breath sounds  No stridor  No wheezing, rhonchi or rales  Chest:      Chest wall: No tenderness  Musculoskeletal:         General: No swelling, tenderness, deformity or signs of injury  Normal range of motion  Cervical back: Normal range of motion and neck supple  No rigidity  No muscular tenderness  Right lower leg: No edema  Left lower leg: No edema  Lymphadenopathy:      Cervical: No cervical adenopathy  Skin:     General: Skin is warm and dry  Capillary Refill: Capillary refill takes less than 2 seconds  Coloration: Skin is not jaundiced  Findings: No bruising, erythema, lesion or rash  Neurological:      Mental Status: She is alert and oriented to person, place, and time  Mental status is at baseline  Cranial Nerves: No cranial nerve deficit  Sensory: No sensory deficit  Motor: No weakness  Coordination: Coordination normal       Gait: Gait normal    Psychiatric:         Mood and Affect: Mood normal          Behavior: Behavior normal          Thought Content:  Thought content normal          Judgment: Judgment normal

## 2022-08-25 ENCOUNTER — APPOINTMENT (OUTPATIENT)
Dept: PHYSICAL THERAPY | Facility: CLINIC | Age: 62
End: 2022-08-25
Payer: COMMERCIAL

## 2022-08-25 DIAGNOSIS — N39.0 URINARY TRACT INFECTION WITHOUT HEMATURIA, SITE UNSPECIFIED: Primary | ICD-10-CM

## 2022-08-25 LAB — BACTERIA UR CULT: ABNORMAL

## 2022-08-25 RX ORDER — CEFPODOXIME PROXETIL 100 MG/1
100 TABLET, FILM COATED ORAL 2 TIMES DAILY
Qty: 14 TABLET | Refills: 0 | Status: CANCELLED | OUTPATIENT
Start: 2022-08-25 | End: 2022-09-01

## 2022-08-29 ENCOUNTER — TELEPHONE (OUTPATIENT)
Dept: CARDIOLOGY CLINIC | Facility: CLINIC | Age: 62
End: 2022-08-29

## 2022-08-29 ENCOUNTER — APPOINTMENT (OUTPATIENT)
Dept: PHYSICAL THERAPY | Facility: CLINIC | Age: 62
End: 2022-08-29
Payer: COMMERCIAL

## 2022-08-29 NOTE — TELEPHONE ENCOUNTER
Nevaeh Olmedo called saying she was at physical therapy today and she said she had an extreme flair up as far as her heart rate was concerned  She said it took her breath away, she got a headache and was dizzy  Her 02 sat went from 92% to 94%  She has a UTI that she has been battling for months and she wanted to know if that was the problem  She also asked if she should get her echo sooner than November   Please advise

## 2022-08-30 ENCOUNTER — HOSPITAL ENCOUNTER (OUTPATIENT)
Dept: CT IMAGING | Facility: HOSPITAL | Age: 62
Discharge: HOME | End: 2022-08-30
Payer: COMMERCIAL

## 2022-08-30 DIAGNOSIS — N30.10 INTERSTITIAL CYSTITIS: ICD-10-CM

## 2022-08-30 DIAGNOSIS — R10.10 PAIN OF UPPER ABDOMEN: ICD-10-CM

## 2022-08-30 DIAGNOSIS — N39.0 RECURRENT UTI: ICD-10-CM

## 2022-08-30 DIAGNOSIS — N10 ACUTE PYELONEPHRITIS: ICD-10-CM

## 2022-08-30 PROCEDURE — 74176 CT ABD & PELVIS W/O CONTRAST: CPT

## 2022-08-30 PROCEDURE — G1004 CDSM NDSC: HCPCS

## 2022-08-30 NOTE — TELEPHONE ENCOUNTER
Not really sure that a sooner echocardiogram is warranted  If she has pins and needles in her hands and face, and recent UTI, I do not think an echo will help  Even if she has intermittent increase in heart rate with physical therapy echocardiogram done sooner will not help much  I did review the prior physical therapy note from 08/15/2022 which did not mention tachycardia or dysrhythmia  If she has noncardiac symptoms, I recommend she discuss this further with her PCP  For now, would continue echocardiogram as scheduled for reasons stated above

## 2022-08-30 NOTE — TELEPHONE ENCOUNTER
Javed Hines called again about getting the echo sooner  She said she had pins and needle feeling in her hands and in her face which could be a sinus infection she said  I told her if she is having this many symptoms she should go to the er and get checked out  She said no she is avoiding the er she just wants to get the echo done sooner   Please advise

## 2022-08-31 ENCOUNTER — OFFICE VISIT (OUTPATIENT)
Dept: CARDIOLOGY CLINIC | Facility: CLINIC | Age: 62
End: 2022-08-31
Payer: COMMERCIAL

## 2022-08-31 VITALS
HEIGHT: 64 IN | SYSTOLIC BLOOD PRESSURE: 126 MMHG | WEIGHT: 198.4 LBS | DIASTOLIC BLOOD PRESSURE: 84 MMHG | BODY MASS INDEX: 33.87 KG/M2 | HEART RATE: 64 BPM

## 2022-08-31 DIAGNOSIS — R00.2 PALPITATIONS: Primary | ICD-10-CM

## 2022-08-31 DIAGNOSIS — I48.0 PAROXYSMAL ATRIAL FIBRILLATION (HCC): ICD-10-CM

## 2022-08-31 PROCEDURE — 99214 OFFICE O/P EST MOD 30 MIN: CPT | Performed by: INTERNAL MEDICINE

## 2022-08-31 NOTE — PROGRESS NOTES
Cardiology             Jonathan Baig  1960  [id number]          Assessment/Plan:     Paroxysmal atrial fibrillation, diagnosed on Holter monitor 03/2020  Hypertension  Multiple drug intolerances and possible allergies  Probable heterozygous familial hypercholesterolemia, on Repatha  Obesity   Sleep apnea, compliant with CPAP therapy   Lower extremity edema, on furosemide, resolved           Patient complains of severe episodes of palpitations 2 days ago, and states tachycardia was noted by her physical therapist   There are no rhythm strips, ECG, and the patient did not go to the ER  I will request a 2 week Zio monitor for further evaluation  If she continues to have palpitations and a nondiagnostic Zio monitor, loop recorder implantation may be considered, as she attributes a lot of her nonspecific symptoms to her atrial fibrillation  I have told her that we need to document atrial fibrillation as a cause of her symptoms so that we can address this appropriately  She is agreeable with this plan  If nondiagnostic Zio monitor, will refer to electrophysiology for consideration of loop recorder implantation to get her to better sense of her atrial fibrillation burden and to see if it correlates with her symptoms  She does admit to significant stress and becomes tearful in describing her emotional stressors  Continue metoprolol and diltiazem  Blood pressure controlled today, which historically has been difficult to control as she has reported intolerances to multiple medications    HCTZ has caused "throat closing," atorvastatin has caused joint pains, valsartan has caused reported angioedema, hydralazine has caused "kidney pain," nifedipine caused headache and nausea, verapamil cause increase in palpitations                    Follow-up with me in 2 months              Interval History:      This is a 49-year-old female diagnosed with paroxysmal atrial fibrillation on Holter monitoring 3/020   That time echocardiogram had revealed mild-to-moderate aortic regurgitation with normal left ventricular systolic function   Nuclear stress test June 2020 was unremarkable with normal perfusion      She has hypertension, and has multiple medications listed as allergies including beta-blockers, although she has been taking metoprolol for quite some time   Although on her allergy list HCTZ states throat closing," she states that actually cause some ankle swelling along with amlodipine   Atorvastatin caused joint pain, although it is listed in her allergies also as throat closing     She seems to have had a true allergic reaction to hydralazine and valsartan   She states valsartan caused angioedema, and she does not remember what happened with hydralazine, although states she thinks it may have been ankle swelling      She was last seen by Dr Shannan Salazar 01/28/2021 at which time she was maintained on metoprolol succinate and spironolactone for hypertension   At 1 point she was asked to take diltiazem as needed for palpitations  Maryann Wooten has been maintained on Eliquis anticoagulation        She underwent a Zio monitor 03/2021 at [facility](should be scanned into media section, personally reviewed rhythm strips) revealing normal sinus rhythm with 8 runs of SVT, longest lasting 17 beats   There were 18 patient triggers, most of which correlated with PACs, short atrial runs, and PVCs  Yong Charles was no evidence of atrial fibrillation      Nuclear stress test 06/2020 was unremarkable     In August 2021 she was hospitalized at [facility] for epigastric and left upper quadrant pain, admitted for superior mesenteric artery thrombosis   She received heparin drip, and underwent SMA stenting with IR on 08/19/2021   She was initiated on Plavix, and continued on Eliquis      During her prior visit 10/15/2021 she was initiated on diltiazem which she later stopped due to headache and nausea   Subsequent nifedipine was tried which caused headache and nausea as well which she stopped on her own   Verapamil caused increase in palpitations      She went to [facility] ER due to urinary retention and was reported to be in atrial fibrillation      She presents today for follow-up  She states that 2 days ago she had severe palpitations with discomfort in her neck radiating into her head and felt that she was in atrial fibrillation  She did not go to the ER but states tachycardia was noted by her physical therapist even as though not documented  She denies chest discomfort shortness of breath                Vitals:  Vitals:    08/31/22 1545   BP: 126/84   BP Location: Right arm   Patient Position: Sitting   Cuff Size: Large   Pulse: 64   Weight: 90 kg (198 lb 6 4 oz)   Height: 5' 4" (1 626 m)         Past Medical History:   Diagnosis Date    [facility] ) 03/2020    Allergic     Anxiety     Arthritis     Asthma     Colon polyp     Depression     GERD (gastroesophageal reflux disease)     Heart murmur     Hives     Hyperlipidemia     Hypertension     Infertility, female     Migraine     Palpitations     Psychiatric disorder     Wears glasses      Social History     Socioeconomic History    Marital status:      Spouse name: Not on file    Number of children: 0    Years of education: Not on file    Highest education level: Not on file   Occupational History    Occupation: unemployed   Tobacco Use    Smoking status: Former Smoker     Packs/day: 0 50     Years: 10 00     Pack years: 5 00     Quit date: 2019     Years since quitting: 3 6    Smokeless tobacco: Never Used   Vaping Use    Vaping Use: Never used   Substance and Sexual Activity    Alcohol use: No    Drug use: No    Sexual activity: Not Currently   Other Topics Concern    Not on file   Social History Narrative    Who lives in your home: Alone     What type of home do you live in: Apartment     Age of your home: 1952 built     How long have you been living there: 5 yrs Type of heat: forced hot air     Type of fuel: electric     What type of jamin is in your bedroom: carpet jamin     Do you have the following in or near your home:    Air products: Humidifier in the bedroom/kitchen     Pests: none     Pets: none     Are pets allowed in bedroom: N/A     Open fields, wooded areas nearby: No     Basement: [id number] mold     Exposure to second hand smoke: No    Central air     Habits:    Caffeine: Hot tea 1 cup daily- ice tea 1 cup in the afternoon    Chocolate: Occasionally      Social Determinants of Health     Financial Resource Strain: Not on file   Food Insecurity: Not on file   Transportation Needs: Not on file   Physical Activity: Not on file   Stress: Not on file   Social Connections: Not on file   Intimate Partner Violence: Not on file   Housing Stability: Not on file      Family History   Problem Relation Age of Onset    Lung cancer Mother 61    Brain cancer Mother 61    Diabetes Father     Depression Father     Other Father         septic    Allergies Sister     Hashimoto's thyroiditis Sister     Abdominal aortic aneurysm Sister     Diabetes Sister     No Known Problems Sister     No Known Problems Maternal Grandmother     No Known Problems Maternal Grandfather     No Known Problems Paternal Grandmother     No Known Problems Paternal Grandfather     Hodgkin's lymphoma Brother     No Known Problems Brother     No Known Problems Maternal Aunt     Diabetes Other      Past Surgical History:   Procedure Laterality Date    COLONOSCOPY      EGD      EXPLORATORY LAPAROTOMY      for infertility    HAND SURGERY      Hand excision of tendon cyst    MT LAP,APPENDECTOMY N/A 5/3/2022    Procedure: APPENDECTOMY LAPAROSCOPIC;  Surgeon:  Brooke Smith MD;  Location: [facility];  Service: General    SUPERIOR MESTENTERIC ARTERY STENT      WISDOM TOOTH EXTRACTION         Current Outpatient Medications:     acetaminophen (TYLENOL) 325 mg tablet, Take 650 mg by mouth as needed for mild pain, Disp: , Rfl:     apixaban (Eliquis) 5 mg, Take 1 tablet (5 mg total) by mouth 2 (two) times a day, Disp: 180 tablet, Rfl: 5    azelastine (OPTIVAR) 0 05 % ophthalmic solution, Administer 1 drop to both eyes 2 (two) times a day, Disp: 6 mL, Rfl: 12    azelastine (OPTIVAR) 0 05 % ophthalmic solution, Administer 1 drop to both eyes 2 (two) times a day, Disp: 6 mL, Rfl: 12    butalbital-acetaminophen-caffeine (FIORICET,ESGIC) -40 mg per tablet, Take 1 tablet by mouth every 6 (six) hours as needed for headaches, Disp: 30 tablet, Rfl: 0    Carafate 1 GM/10ML suspension, Take 10 mL (1 g total) by mouth 4 (four) times a day, Disp: 3600 mL, Rfl: 1    cephalexin (KEFLEX) 250 mg capsule, 500 mg, Disp: , Rfl:     cetirizine (ZyrTEC) 10 mg tablet, Take 1 tablet (10 mg total) by mouth daily for 365 doses, Disp: 30 tablet, Rfl: 11    chlordiazepoxide-clidinium (LIBRAX) 5-2 5 mg per capsule, Take 1 capsule by mouth 2 (two) times a day before meals, Disp: 60 capsule, Rfl: 5    chlorhexidine (PERIDEX) 0 12 % solution, , Disp: , Rfl:     Cholecalciferol 25 MCG (1000 UT) tablet, Take 1,000 Units by mouth daily, Disp: , Rfl:     clotrimazole-betamethasone (LOTRISONE) 1-0 05 % cream, , Disp: , Rfl:     dexlansoprazole (DEXILANT) 60 MG capsule, Take 1 capsule (60 mg total) by mouth daily Please take on an empty stomach 30 minutes before eating, Disp: 30 capsule, Rfl: 3    diltiazem (CARDIZEM) 120 MG tablet, Take 120 mg by mouth if needed (For heart palpitations), Disp: , Rfl:     ELDERBERRY PO, Take by mouth daily, Disp: , Rfl:     Evolocumab 140 MG/ML SOAJ, Inject 1 mL (140 mg total) under the skin every 14 (fourteen) days, Disp: 2 mL, Rfl: 5    fluconazole (DIFLUCAN) 150 mg tablet, , Disp: , Rfl:     fluticasone-vilanterol (BREO ELLIPTA) 200-25 MCG/INH inhaler, Inhale 1 puff daily Rinse mouth after use , Disp: 3 Inhaler, Rfl: 3    furosemide (LASIX) 40 mg tablet, Take 1 tablet (40 mg total) by mouth daily, Disp: 90 tablet, Rfl: 3    Ginger, Zingiber officinalis, (GINGER PO), Take 1 tablet by mouth in the morning, Disp: , Rfl:     Heparin Sodium, Porcine, (heparin, porcine,) 98489 UNIT/ML, , Disp: , Rfl:     hydrocortisone-pramoxine (PROCTOFOAM-HC) 1-1 % FOAM rectal foam, Insert 1 applicator into the rectum 2 (two) times a day, Disp: 10 g, Rfl: 2    ipratropium (ATROVENT) 0 06 % nasal spray, , Disp: , Rfl:     ketoconazole (NIZORAL) 2 % cream, Apply topically daily, Disp: 60 g, Rfl: 2    linaCLOtide (Linzess) 145 MCG CAPS, Take 1 capsule (145 mcg total) by mouth in the morning, Disp: 30 capsule, Rfl: 2    LORazepam (ATIVAN) 0 5 mg tablet, Take 1 tablet (0 5 mg total) by mouth every 8 (eight) hours as needed for anxiety, Disp: 90 tablet, Rfl: 0    Meth-Hyo-M Bl-Na Phos-Ph Sal (Uribel) 118 MG CAPS, Take 1 each by mouth Three times a day, Disp: , Rfl:     metoprolol succinate (TOPROL-XL) 100 mg 24 hr tablet, Take 1 tablet (100 mg total) by mouth 2 (two) times a day, Disp: 180 tablet, Rfl: 2    montelukast (SINGULAIR) 10 mg tablet, Take 1 tablet (10 mg total) by mouth daily at bedtime, Disp: 90 tablet, Rfl: 0    MULTIPLE VITAMIN PO, Take by mouth, Disp: , Rfl:     naloxone (NARCAN) 4 mg/0 1 mL nasal spray, Administer 1 spray into a nostril   If no response after 2-3 minutes, give another dose in the other nostril using a new spray , Disp: 1 each, Rfl: 1    nitrofurantoin (MACROBID) 100 mg capsule, , Disp: , Rfl:     nystatin (MYCOSTATIN) powder, Apply topically three times a weekly to affected area, Disp: 60 g, Rfl: 3    omeprazole (PriLOSEC) 40 MG capsule, Take 1 capsule (40 mg total) by mouth daily, Disp: 309 capsule, Rfl: 4    ondansetron (Zofran ODT) 4 mg disintegrating tablet, Take 1 tablet (4 mg total) by mouth every 6 (six) hours as needed for nausea or vomiting, Disp: 60 tablet, Rfl: 0    phenazopyridine (PYRIDIUM) 200 mg tablet, Take 1 tablet (200 mg total) by mouth 3 (three) times a day with meals, Disp: 10 tablet, Rfl: 0    pimecrolimus (ELIDEL) 1 % cream, Apply topically to rash on eyelids and hands once or twice a daily  , Disp: 100 g, Rfl: 3    Probiotic Product (PROBIOTIC-10 PO), Take 1 tablet by mouth in the morning, Disp: , Rfl:     spironolactone (ALDACTONE) 25 mg tablet, TAKE ONE TABLET BY MOUTH TWICE DAILY, Disp: 180 tablet, Rfl: 0    sucralfate (CARAFATE) 1 g tablet, TAKE ONE TABLET BY MOUTH FOUR TIMES DAILY, Disp: 360 tablet, Rfl: 0    traMADol (ULTRAM) 50 mg tablet, Take 1 tablet (50 mg total) by mouth 3 (three) times a day as needed for severe pain, Disp: 90 tablet, Rfl: 0    triamcinolone (KENALOG) 0 1 % cream, Apply topically 2 (two) times a day, Disp: 30 g, Rfl: 1    triamcinolone (KENALOG) 0 1 % cream, Apply topically 2 (two) times a day, Disp: 453 6 g, Rfl: 0    triamcinolone acetonide (KENALOG-40) 40 mg/mL, , Disp: , Rfl:     Turmeric 500 MG CAPS, , Disp: , Rfl:     Zinc 100 MG TABS, Take by mouth daily, Disp: , Rfl:     fexofenadine (ALLEGRA) 180 MG tablet, Take 1 tablet (180 mg total) by mouth daily IN AM, Disp: 30 tablet, Rfl: 12    Na Sulfate-K Sulfate-Mg Sulf (Suprep Bowel Prep Kit) 17 5-3 13-1 6 GM/177ML SOLN, Take 1 Bottle by mouth once for 1 dose, Disp: 177 mL, Rfl: 0    polyethylene glycol (GOLYTELY) 4000 mL solution, Take 4,000 mL by mouth once for 1 dose Please follow instruction sheet from the office, Disp: 4000 mL, Rfl: 0    Current Facility-Administered Medications:     cyanocobalamin injection 1,000 mcg, 1,000 mcg, Intramuscular, Q30 Days, Tash Freedman DO, 1,000 mcg at 08/24/22 1412        Review of Systems:  Review of Systems   Constitutional: Negative for activity change, fever and unexpected weight change  HENT: Negative for facial swelling, nosebleeds and voice change  Respiratory: Negative for chest tightness, shortness of breath and wheezing  Cardiovascular: Positive for palpitations   Negative for chest pain and leg swelling  Gastrointestinal: Negative for abdominal distention  Genitourinary: Negative for hematuria  Musculoskeletal: Negative for arthralgias  Skin: Negative for color change, pallor, rash and wound  Neurological: Negative for dizziness, seizures and syncope  Psychiatric/Behavioral: Negative for agitation  Physical Exam:  Physical Exam  Vitals reviewed  Constitutional:       Appearance: She is well-developed  HENT:      Head: Normocephalic and atraumatic  Cardiovascular:      Rate and Rhythm: Normal rate and regular rhythm  Heart sounds: Normal heart sounds  Pulmonary:      Effort: Pulmonary effort is normal       Breath sounds: Normal breath sounds  Abdominal:      Palpations: Abdomen is soft  Musculoskeletal:         General: Normal range of motion  Cervical back: Normal range of motion and neck supple  Skin:     General: Skin is warm and dry  Neurological:      Mental Status: She is alert and oriented to person, place, and time  Psychiatric:         Behavior: Behavior normal          Thought Content: Thought content normal          Judgment: Judgment normal          This note was completed in part utilizing [employer] Software  Grammatical errors, random word insertions, spelling mistakes, and incomplete sentences can be an occasional consequence of this system secondary to software limitations, ambient noise, and hardware issues  If you have any questions or concerns about the content, text, or information contained within the body of this dictation, please contact the provider for clarification

## 2022-09-01 ENCOUNTER — APPOINTMENT (OUTPATIENT)
Dept: RADIOLOGY | Facility: HOSPITAL | Age: 62
DRG: 054 | End: 2022-09-01
Payer: COMMERCIAL

## 2022-09-01 ENCOUNTER — OFFICE VISIT (OUTPATIENT)
Dept: FAMILY MEDICINE CLINIC | Facility: CLINIC | Age: 62
End: 2022-09-01
Payer: COMMERCIAL

## 2022-09-01 ENCOUNTER — TELEPHONE (OUTPATIENT)
Dept: RADIOLOGY | Facility: HOSPITAL | Age: 62
End: 2022-09-01

## 2022-09-01 ENCOUNTER — HOSPITAL ENCOUNTER (INPATIENT)
Facility: HOSPITAL | Age: 62
LOS: 3 days | Discharge: HOME | DRG: 054 | End: 2022-09-04
Attending: EMERGENCY MEDICINE | Admitting: INTERNAL MEDICINE
Payer: COMMERCIAL

## 2022-09-01 VITALS
HEIGHT: 64 IN | HEART RATE: 62 BPM | DIASTOLIC BLOOD PRESSURE: 88 MMHG | BODY MASS INDEX: 33.8 KG/M2 | SYSTOLIC BLOOD PRESSURE: 124 MMHG | WEIGHT: 198 LBS | OXYGEN SATURATION: 98 % | TEMPERATURE: 97.3 F

## 2022-09-01 DIAGNOSIS — I10 BENIGN ESSENTIAL HYPERTENSION: ICD-10-CM

## 2022-09-01 DIAGNOSIS — R29.90 STROKE-LIKE SYMPTOMS: Primary | ICD-10-CM

## 2022-09-01 DIAGNOSIS — I63.9 ACUTE CVA (CEREBROVASCULAR ACCIDENT) (HCC): Primary | ICD-10-CM

## 2022-09-01 DIAGNOSIS — K55.069 MESENTERIC ARTERY THROMBOSIS (HCC): ICD-10-CM

## 2022-09-01 DIAGNOSIS — I65.29 CAROTID ARTERY STENOSIS: ICD-10-CM

## 2022-09-01 DIAGNOSIS — I73.9 PERIPHERAL VASCULAR DISEASE (HCC): ICD-10-CM

## 2022-09-01 DIAGNOSIS — I48.0 PAROXYSMAL ATRIAL FIBRILLATION (HCC): ICD-10-CM

## 2022-09-01 PROBLEM — R22.42 LOCALIZED SWELLING OF LEFT LOWER EXTREMITY: Status: ACTIVE | Noted: 2022-09-01

## 2022-09-01 LAB
2HR DELTA HS TROPONIN: 1 NG/L
4HR DELTA HS TROPONIN: 1 NG/L
ANION GAP SERPL CALCULATED.3IONS-SCNC: 4 MMOL/L (ref 4–13)
APTT PPP: 34 SECONDS (ref 23–37)
BACTERIA UR QL AUTO: ABNORMAL /HPF
BILIRUB UR QL STRIP: NEGATIVE
BUN SERPL-MCNC: 20 MG/DL (ref 5–25)
CALCIUM SERPL-MCNC: 9.5 MG/DL (ref 8.3–10.1)
CARDIAC TROPONIN I PNL SERPL HS: 8 NG/L
CARDIAC TROPONIN I PNL SERPL HS: 9 NG/L
CARDIAC TROPONIN I PNL SERPL HS: 9 NG/L
CHLORIDE SERPL-SCNC: 104 MMOL/L (ref 96–108)
CLARITY UR: CLEAR
CO2 SERPL-SCNC: 30 MMOL/L (ref 21–32)
COLOR UR: YELLOW
CREAT SERPL-MCNC: 0.91 MG/DL (ref 0.6–1.3)
ERYTHROCYTE [DISTWIDTH] IN BLOOD BY AUTOMATED COUNT: 13 % (ref 11.6–15.1)
GFR SERPL CREATININE-BSD FRML MDRD: 66 ML/MIN/1.73SQ M
GLUCOSE SERPL-MCNC: 122 MG/DL (ref 65–140)
GLUCOSE SERPL-MCNC: 138 MG/DL (ref 65–140)
GLUCOSE UR STRIP-MCNC: NEGATIVE MG/DL
HCT VFR BLD AUTO: 41.8 % (ref 34.8–46.1)
HGB BLD-MCNC: 13.5 G/DL (ref 11.5–15.4)
HGB UR QL STRIP.AUTO: NEGATIVE
INR PPP: 1.12 (ref 0.84–1.19)
KETONES UR STRIP-MCNC: NEGATIVE MG/DL
LEUKOCYTE ESTERASE UR QL STRIP: NEGATIVE
MCH RBC QN AUTO: 29.7 PG (ref 26.8–34.3)
MCHC RBC AUTO-ENTMCNC: 32.3 G/DL (ref 31.4–37.4)
MCV RBC AUTO: 92 FL (ref 82–98)
NITRITE UR QL STRIP: NEGATIVE
NON-SQ EPI CELLS URNS QL MICRO: ABNORMAL /HPF
PH UR STRIP.AUTO: 6 [PH]
PLATELET # BLD AUTO: 241 THOUSANDS/UL (ref 149–390)
PLATELET # BLD AUTO: 258 THOUSANDS/UL (ref 149–390)
PMV BLD AUTO: 9.1 FL (ref 8.9–12.7)
PMV BLD AUTO: 9.2 FL (ref 8.9–12.7)
POTASSIUM SERPL-SCNC: 3.6 MMOL/L (ref 3.5–5.3)
PROT UR STRIP-MCNC: NEGATIVE MG/DL
PROTHROMBIN TIME: 14.6 SECONDS (ref 11.6–14.5)
RBC # BLD AUTO: 4.55 MILLION/UL (ref 3.81–5.12)
RBC #/AREA URNS AUTO: ABNORMAL /HPF
SODIUM SERPL-SCNC: 138 MMOL/L (ref 135–147)
SP GR UR STRIP.AUTO: 1.05 (ref 1–1.03)
UROBILINOGEN UR STRIP-ACNC: <2 MG/DL
WBC # BLD AUTO: 8.09 THOUSAND/UL (ref 4.31–10.16)
WBC #/AREA URNS AUTO: ABNORMAL /HPF

## 2022-09-01 PROCEDURE — 99215 OFFICE O/P EST HI 40 MIN: CPT | Performed by: FAMILY MEDICINE

## 2022-09-01 PROCEDURE — 84484 ASSAY OF TROPONIN QUANT: CPT | Performed by: EMERGENCY MEDICINE

## 2022-09-01 PROCEDURE — 93005 ELECTROCARDIOGRAM TRACING: CPT

## 2022-09-01 PROCEDURE — 99285 EMERGENCY DEPT VISIT HI MDM: CPT | Performed by: EMERGENCY MEDICINE

## 2022-09-01 PROCEDURE — 70496 CT ANGIOGRAPHY HEAD: CPT

## 2022-09-01 PROCEDURE — 85049 AUTOMATED PLATELET COUNT: CPT | Performed by: INTERNAL MEDICINE

## 2022-09-01 PROCEDURE — 81001 URINALYSIS AUTO W/SCOPE: CPT | Performed by: INTERNAL MEDICINE

## 2022-09-01 PROCEDURE — 82948 REAGENT STRIP/BLOOD GLUCOSE: CPT

## 2022-09-01 PROCEDURE — 80048 BASIC METABOLIC PNL TOTAL CA: CPT | Performed by: EMERGENCY MEDICINE

## 2022-09-01 PROCEDURE — 99223 1ST HOSP IP/OBS HIGH 75: CPT | Performed by: INTERNAL MEDICINE

## 2022-09-01 PROCEDURE — 70498 CT ANGIOGRAPHY NECK: CPT

## 2022-09-01 PROCEDURE — 85730 THROMBOPLASTIN TIME PARTIAL: CPT | Performed by: EMERGENCY MEDICINE

## 2022-09-01 PROCEDURE — 85027 COMPLETE CBC AUTOMATED: CPT | Performed by: EMERGENCY MEDICINE

## 2022-09-01 PROCEDURE — 99255 IP/OBS CONSLTJ NEW/EST HI 80: CPT | Performed by: PSYCHIATRY & NEUROLOGY

## 2022-09-01 PROCEDURE — 70551 MRI BRAIN STEM W/O DYE: CPT

## 2022-09-01 PROCEDURE — 36415 COLL VENOUS BLD VENIPUNCTURE: CPT | Performed by: EMERGENCY MEDICINE

## 2022-09-01 PROCEDURE — 99285 EMERGENCY DEPT VISIT HI MDM: CPT

## 2022-09-01 PROCEDURE — 85610 PROTHROMBIN TIME: CPT | Performed by: EMERGENCY MEDICINE

## 2022-09-01 PROCEDURE — G1004 CDSM NDSC: HCPCS

## 2022-09-01 PROCEDURE — 92610 EVALUATE SWALLOWING FUNCTION: CPT

## 2022-09-01 RX ORDER — MONTELUKAST SODIUM 10 MG/1
10 TABLET ORAL
Status: DISCONTINUED | OUTPATIENT
Start: 2022-09-01 | End: 2022-09-04 | Stop reason: HOSPADM

## 2022-09-01 RX ORDER — DIPHENHYDRAMINE HYDROCHLORIDE 50 MG/ML
25 INJECTION INTRAMUSCULAR; INTRAVENOUS ONCE
Status: COMPLETED | OUTPATIENT
Start: 2022-09-01 | End: 2022-09-01

## 2022-09-01 RX ORDER — SUCRALFATE 1 G/1
1 TABLET ORAL 4 TIMES DAILY
Status: DISCONTINUED | OUTPATIENT
Start: 2022-09-01 | End: 2022-09-04 | Stop reason: HOSPADM

## 2022-09-01 RX ORDER — MAGNESIUM SULFATE HEPTAHYDRATE 40 MG/ML
2 INJECTION, SOLUTION INTRAVENOUS ONCE
Status: COMPLETED | OUTPATIENT
Start: 2022-09-01 | End: 2022-09-01

## 2022-09-01 RX ORDER — LORAZEPAM 1 MG/1
1 TABLET ORAL ONCE
Status: COMPLETED | OUTPATIENT
Start: 2022-09-01 | End: 2022-09-01

## 2022-09-01 RX ORDER — ASPIRIN 81 MG/1
81 TABLET, CHEWABLE ORAL DAILY
Status: DISCONTINUED | OUTPATIENT
Start: 2022-09-02 | End: 2022-09-01

## 2022-09-01 RX ORDER — ASPIRIN 81 MG/1
81 TABLET, CHEWABLE ORAL DAILY
Status: DISCONTINUED | OUTPATIENT
Start: 2022-09-02 | End: 2022-09-04 | Stop reason: HOSPADM

## 2022-09-01 RX ORDER — PANTOPRAZOLE SODIUM 40 MG/1
40 TABLET, DELAYED RELEASE ORAL
Status: DISCONTINUED | OUTPATIENT
Start: 2022-09-02 | End: 2022-09-04 | Stop reason: HOSPADM

## 2022-09-01 RX ORDER — TRAMADOL HYDROCHLORIDE 50 MG/1
50 TABLET ORAL 3 TIMES DAILY PRN
Status: DISCONTINUED | OUTPATIENT
Start: 2022-09-01 | End: 2022-09-04 | Stop reason: HOSPADM

## 2022-09-01 RX ORDER — ASPIRIN 81 MG/1
81 TABLET, CHEWABLE ORAL DAILY
Status: DISCONTINUED | OUTPATIENT
Start: 2022-09-01 | End: 2022-09-01

## 2022-09-01 RX ORDER — ACETAMINOPHEN 325 MG/1
650 TABLET ORAL EVERY 8 HOURS PRN
Status: DISCONTINUED | OUTPATIENT
Start: 2022-09-01 | End: 2022-09-04 | Stop reason: HOSPADM

## 2022-09-01 RX ORDER — LORAZEPAM 0.5 MG/1
0.5 TABLET ORAL EVERY 8 HOURS PRN
Status: DISCONTINUED | OUTPATIENT
Start: 2022-09-01 | End: 2022-09-04 | Stop reason: HOSPADM

## 2022-09-01 RX ORDER — LABETALOL HYDROCHLORIDE 5 MG/ML
10 INJECTION, SOLUTION INTRAVENOUS EVERY 6 HOURS PRN
Status: DISCONTINUED | OUTPATIENT
Start: 2022-09-01 | End: 2022-09-04 | Stop reason: HOSPADM

## 2022-09-01 RX ORDER — METOCLOPRAMIDE HYDROCHLORIDE 5 MG/ML
10 INJECTION INTRAMUSCULAR; INTRAVENOUS ONCE
Status: COMPLETED | OUTPATIENT
Start: 2022-09-01 | End: 2022-09-01

## 2022-09-01 RX ORDER — ENOXAPARIN SODIUM 100 MG/ML
40 INJECTION SUBCUTANEOUS DAILY
Status: DISCONTINUED | OUTPATIENT
Start: 2022-09-02 | End: 2022-09-01

## 2022-09-01 RX ADMIN — APIXABAN 5 MG: 5 TABLET, FILM COATED ORAL at 16:51

## 2022-09-01 RX ADMIN — MONTELUKAST 10 MG: 10 TABLET, FILM COATED ORAL at 21:09

## 2022-09-01 RX ADMIN — SUCRALFATE 1 G: 1 TABLET ORAL at 17:29

## 2022-09-01 RX ADMIN — DIPHENHYDRAMINE HYDROCHLORIDE 25 MG: 50 INJECTION, SOLUTION INTRAMUSCULAR; INTRAVENOUS at 12:11

## 2022-09-01 RX ADMIN — SUCRALFATE 1 G: 1 TABLET ORAL at 21:09

## 2022-09-01 RX ADMIN — SUCRALFATE 1 G: 1 TABLET ORAL at 15:00

## 2022-09-01 RX ADMIN — METOCLOPRAMIDE HYDROCHLORIDE 10 MG: 5 INJECTION INTRAMUSCULAR; INTRAVENOUS at 12:12

## 2022-09-01 RX ADMIN — IOHEXOL 85 ML: 350 INJECTION, SOLUTION INTRAVENOUS at 10:40

## 2022-09-01 RX ADMIN — LORAZEPAM 1 MG: 1 TABLET ORAL at 12:11

## 2022-09-01 RX ADMIN — ACETAMINOPHEN 650 MG: 325 TABLET ORAL at 16:50

## 2022-09-01 RX ADMIN — MAGNESIUM SULFATE HEPTAHYDRATE 2 G: 40 INJECTION, SOLUTION INTRAVENOUS at 13:09

## 2022-09-01 RX ADMIN — LORAZEPAM 0.5 MG: 0.5 TABLET ORAL at 20:07

## 2022-09-01 NOTE — H&P
[facility]  H&P- [place] Safia 1960, 57 y o  female MRN: [id number]  Unit/Bed#: ED 24 Encounter: [id number]  Primary Care Provider: Robert Sim DO   Date and time admitted to hospital: 9/1/2022 10:30 AM    * Stroke-like symptoms  Assessment & Plan  · Yesterday patient developed left-sided facial numbness around midnight, went to bed woke up around 6:00 a m  continue to have left-sided facial numbness, left arm numbness and left leg weakness  · CT head showed no acute intracranial abnormality  · CTA showed no intracranial large vessel occlusion or critical stenosis, no aneurysm  Progression of atherosclerotic disease in the left cervical carotid artery but a new linear intraluminal or defect at the origin/proximal ICA suggesting an ulcerated plaque resulting in about 80% stenosis of proximal ICA  · Admit under stroke pathway  · Neuro checks  · MRI of the brain  · Echo  · Lipid panel/hemoglobin A1c  · Permissive hypertension per Neurology, labetalol if systolic blood pressure is above 180  · Telemetry  · PT OT  · Continue aspirin, Eliquis  · Not tolerating statin    Headache  Assessment & Plan  · History of headaches, received Benadryl/Reglan and magnesium per Neurology    Localized swelling of left lower extremity  Assessment & Plan  · For history of lower extremity swelling patient is on Lasix 40 mg daily  · She had an unremarkable echo 2020  · Repeat echo as part of a stroke pathway  · Hold Lasix due to permissive hypertension, clinically patient euvolemic    No lower extremity swelling    Acute cystitis without hematuria  Assessment & Plan  · Yesterday she finished treatment for UTI    Paroxysmal atrial fibrillation ([facility] [address] )  Assessment & Plan  · Patient is on metoprolol succinate 100 mg b i d   · On Cardizem 120 mg as needed in case of palpitations  · On Eliquis for anticoagulation  · Patient took the morning dose of metoprolol, hold this evening for permissive hypertension  · Patient took a dose of Cardizem today    Asthma due to seasonal allergies  Assessment & Plan  · Not in acute exacerbation, continue inhalers    VTE Pharmacologic Prophylaxis: VTE Score: 3 Moderate Risk (Score 3-4) - Pharmacological DVT Prophylaxis Ordered: apixaban (Eliquis)  Code Status: Level 1 - Full Code   Discussion with family: Patient declined call to   Anticipated Length of Stay: Patient will be admitted on an inpatient basis with an anticipated length of stay of greater than 2 midnights secondary to Stroke rule out  Total Time for Visit, including Counseling / Coordination of Care: 60 minutes Greater than 50% of this total time spent on direct patient counseling and coordination of care  Chief Complaint:  Left facial numbness    History of Present Illness:  Natividad Gottlieb is a 57 y o  female with a PMH of paroxysmal atrial fibrillation, hypertension, hyperlipidemia, had a who presents with left facial numbness  Patient was in her your usual state of health yesterday, around midnight she developed left facial numbness which occasional happens with her headache and did not thought about it much  She went to bed woke up around 6:00 a m  continue to have left-sided facial numbness, no facial droop  She felt left arm numbness and left arm weakness  Patient was a stroke alert  CT negative for acute stroke  Admit under stroke pathway  Review of Systems:  Review of Systems   Constitutional: Negative for activity change and appetite change  HENT: Negative  Eyes: Negative  Respiratory: Negative for cough and shortness of breath  Cardiovascular: Positive for palpitations  Negative for chest pain and leg swelling  Gastrointestinal: Positive for nausea  Negative for abdominal distention, abdominal pain, constipation, diarrhea and vomiting  Endocrine: Negative  Genitourinary: Negative  Musculoskeletal: Negative  Skin: Negative      Neurological: Negative for dizziness, syncope and headaches  Left facial numbness, left arm numbness, left leg weakness   Hematological: Negative  Psychiatric/Behavioral: Negative  Past Medical and Surgical History:   Past Medical History:   Diagnosis Date    A-fib ([facility] [address] ) 03/2020    Allergic     Anxiety     Arthritis     Asthma     Colon polyp     Depression     GERD (gastroesophageal reflux disease)     Heart murmur     Hives     Hyperlipidemia     Hypertension     Infertility, female     Migraine     Palpitations     Psychiatric disorder     Wears glasses        Past Surgical History:   Procedure Laterality Date    COLONOSCOPY      EGD      EXPLORATORY LAPAROTOMY      for infertility    HAND SURGERY      Hand excision of tendon cyst    DC LAP,APPENDECTOMY N/A 5/3/2022    Procedure: APPENDECTOMY LAPAROSCOPIC;  Surgeon: Hernan Cabrera MD;  Location: [facility];  Service: General    SUPERIOR MESTENTERIC ARTERY STENT      WISDOM TOOTH EXTRACTION         Meds/Allergies:  Prior to Admission medications    Medication Sig Start Date End Date Taking?  Authorizing Provider   acetaminophen (TYLENOL) 325 mg tablet Take 650 mg by mouth as needed for mild pain    Historical Provider, MD   apixaban (Eliquis) 5 mg Take 1 tablet (5 mg total) by mouth 2 (two) times a day 4/13/22   Jami Lynn,    azelastine (OPTIVAR) 0 05 % ophthalmic solution Administer 1 drop to both eyes 2 (two) times a day 4/11/22   Edward Bashir,    azelastine (OPTIVAR) 0 05 % ophthalmic solution Administer 1 drop to both eyes 2 (two) times a day 4/19/22   Edward Bashir DO   butalbital-acetaminophen-caffeine (FIORICET,ESGIC) -40 mg per tablet Take 1 tablet by mouth every 6 (six) hours as needed for headaches 8/15/22   Cheri Anderson DO   Carafate 1 GM/10ML suspension Take 10 mL (1 g total) by mouth 4 (four) times a day 11/9/21   Cheri Anderson DO   cephalexin [facility]) 250 mg capsule 500 mg 1/19/22   Historical Provider, MD   cetirizine (ZyrTEC) 10 mg tablet Take 1 tablet (10 mg total) by mouth daily for 365 doses 1/22/20 9/3/23  Silvina Scherer,    chlordiazepoxide-clidinium (LIBRAX) 5-2 5 mg per capsule Take 1 capsule by mouth 2 (two) times a day before meals 7/2/21   Dianne Graft, DO   chlorhexidine (PERIDEX) 0 12 % solution  10/28/20   Historical Provider, MD   Cholecalciferol 25 MCG (1000 UT) tablet Take 1,000 Units by mouth daily    Historical Provider, MD   clotrimazole-betamethasone (LOTRISONE) 1-0 05 % cream  3/2/22   Historical Provider, MD   dexlansoprazole (DEXILANT) 60 MG capsule Take 1 capsule (60 mg total) by mouth daily Please take on an empty stomach 30 minutes before eating 6/7/22   María Elena Ramirez PA-C   diltiazem (CARDIZEM) 120 MG tablet Take 120 mg by mouth if needed (For heart palpitations)    Historical Provider, MD   ELDERBERRY PO Take by mouth daily    Historical Provider, MD   Evolocumab 140 MG/ML SOAJ Inject 1 mL (140 mg total) under the skin every 14 (fourteen) days 6/20/22   Jami Lynn,    fexofenadine (ALLEGRA) 180 MG tablet Take 1 tablet (180 mg total) by mouth daily IN AM 1/22/20 7/13/22  Silvina Scherer, DO   fluconazole (DIFLUCAN) 150 mg tablet  7/26/22   Historical Provider, MD   fluticasone-vilanterol (BREO ELLIPTA) 200-25 MCG/INH inhaler Inhale 1 puff daily Rinse mouth after use   3/1/21   Dianne Dawkins, DO   furosemide (LASIX) 40 mg tablet Take 1 tablet (40 mg total) by mouth daily 12/3/21 12/3/22  Dianne Graft, DO   Alyssa, Zingiber officinalis, (ALYSSA PO) Take 1 tablet by mouth in the morning    Historical Provider, MD   Heparin Sodium, Porcine, (heparin, porcine,) 30444 UNIT/ML  6/14/22   Historical Provider, MD   hydrocortisone-pramoxine (PROCTOFOAM-HC) 1-1 % FOAM rectal foam Insert 1 applicator into the rectum 2 (two) times a day 3/31/22   María Elena Ramirez PA-C   ipratropium (ATROVENT) 0 06 % nasal spray  2/28/22   Historical Provider, MD   ketoconazole (NIZORAL) 2 % cream Apply topically daily 2/18/22   Mina Ingram MD   linaCLOtide (Linzess) 145 MCG CAPS Take 1 capsule (145 mcg total) by mouth in the morning 8/3/22   Magy Hartley PA-C   LORazepam (ATIVAN) 0 5 mg tablet Take 1 tablet (0 5 mg total) by mouth every 8 (eight) hours as needed for anxiety 8/8/22 9/7/22  Rusty Adjutant,    Meth-Hyo-M Bl-Na Phos-Ph Sal (Uribel) 118 MG CAPS Take 1 each by mouth Three times a day 2/25/22   Historical Provider, MD   metoprolol succinate (TOPROL-XL) 100 mg 24 hr tablet Take 1 tablet (100 mg total) by mouth 2 (two) times a day 3/7/22   Radha Fischer MD   montelukast (SINGULAIR) 10 mg tablet Take 1 tablet (10 mg total) by mouth daily at bedtime 7/27/22   Lita Schooling, DO   MULTIPLE VITAMIN PO Take by mouth    Historical Provider, MD   Na Sulfate-K Sulfate-Mg Sulf (Suprep Bowel Prep Kit) 17 5-3 13-1 6 GM/177ML SOLN Take 1 Bottle by mouth once for 1 dose 7/5/22 7/5/22  Selena Brunner,    naloxone (NARCAN) 4 mg/0 1 mL nasal spray Administer 1 spray into a nostril  If no response after 2-3 minutes, give another dose in the other nostril using a new spray  6/8/22   Lita Schooling, DO   nitrofurantoin (MACROBID) 100 mg capsule  7/28/22   Historical Provider, MD   nystatin (MYCOSTATIN) powder Apply topically three times a weekly to affected area 6/9/22   Anjali Em MD   omeprazole (PriLOSEC) 40 MG capsule Take 1 capsule (40 mg total) by mouth daily 5/9/22   Magy Hartley PA-C   ondansetron (Zofran ODT) 4 mg disintegrating tablet Take 1 tablet (4 mg total) by mouth every 6 (six) hours as needed for nausea or vomiting 8/1/22   Jose M Chapman PA-C   phenazopyridine (PYRIDIUM) 200 mg tablet Take 1 tablet (200 mg total) by mouth 3 (three) times a day with meals 8/29/22   Lita Guillaume, DO   pimecrolimus (ELIDEL) 1 % cream Apply topically to rash on eyelids and hands once or twice a daily   2/8/22   Anjali Em MD   polyethylene glycol (GOLYTELY) 4000 mL solution Take 4,000 mL by mouth once for 1 dose Please follow instruction sheet from the office 7/8/22 7/8/22  JASON [facility], DO   Probiotic Product (PROBIOTIC-10 PO) Take 1 tablet by mouth in the morning    Historical Provider, MD   spironolactone (ALDACTONE) 25 mg tablet TAKE ONE TABLET BY MOUTH TWICE DAILY 8/15/22   Meg Marino,    sucralfate (CARAFATE) 1 g tablet TAKE ONE TABLET BY MOUTH FOUR TIMES DAILY 6/13/22   Meg Marino, DO   traMADol Alfreida Kida) 50 mg tablet Take 1 tablet (50 mg total) by mouth 3 (three) times a day as needed for severe pain 8/18/22   [facility], DO   triamcinolone (KENALOG) 0 1 % cream Apply topically 2 (two) times a day 10/22/20   Nancie Keene MD   triamcinolone (KENALOG) 0 1 % cream Apply topically 2 (two) times a day 5/31/22   Danny Pratida MD   triamcinolone acetonide (KENALOG-40) 40 mg/mL  6/15/22   Historical Provider, MD   Turmeric 500 MG CAPS     Historical Provider, MD   Zinc 100 MG TABS Take by mouth daily    Historical Provider, MD     I have reviewed home medications with patient personally  Allergies: Allergies   Allergen Reactions    Ace Inhibitors Angioedema and Anaphylaxis     Anaphylaxis    Hydralazine Other (See Comments)     Lip swelling  Flank pain    Other Anaphylaxis and Other (See Comments)     Other reaction(s): angioadema  Other reaction(s): Other (See Comments)  Preservatives- itching throat closes, hi  Other reaction(s): angioadema  E Z Cat - hives throat closes itching,  Preservatives- itching throat closes, hi  Artificial sweeteners    Valsartan Angioedema     Lips, face swollen    Ampicillin Hives     Depends on brand some preservatives can react    Benicar [Olmesartan] Angioedema     See Allergy note from 9/11/2008    Swollen ankles/legs    Sulfa Antibiotics Other (See Comments)     stuffiness,itching,hives,throat closing       Social History:  Marital Status:    Substance Use History:   Social History     Substance and Sexual Activity   Alcohol Use No     Social History     Tobacco Use   Smoking Status Former Smoker    Packs/day: 0 50    Years: 10 00    Pack years: 5 00    Quit date: 2019    Years since quitting: 3 6   Smokeless Tobacco Never Used     Social History     Substance and Sexual Activity   Drug Use No       Family History:  Family History   Problem Relation Age of Onset    Lung cancer Mother 61    Brain cancer Mother 61    Diabetes Father     Depression Father     Other Father         septic    Allergies Sister     Hashimoto's thyroiditis Sister     Abdominal aortic aneurysm Sister     Diabetes Sister     No Known Problems Sister     No Known Problems Maternal Grandmother     No Known Problems Maternal Grandfather     No Known Problems Paternal Grandmother     No Known Problems Paternal Grandfather     Hodgkin's lymphoma Brother     No Known Problems Brother     No Known Problems Maternal Aunt     Diabetes Other        Physical Exam:     Vitals:   Blood Pressure: 140/75 (09/01/22 1330)  Pulse: 60 (09/01/22 1330)  Temperature: 98 °F (36 7 °C) (09/01/22 1133)  Temp Source: Oral (09/01/22 1133)  Respirations: 18 (09/01/22 1330)  Weight - Scale: 92 7 kg (204 lb 5 9 oz) (09/01/22 1048)  SpO2: 94 % (09/01/22 1330)    Physical Exam  Constitutional:       General: She is not in acute distress  HENT:      Head: Atraumatic  Cardiovascular:      Rate and Rhythm: Normal rate and regular rhythm  Heart sounds: No murmur heard  No friction rub  No gallop  Pulmonary:      Effort: Pulmonary effort is normal  No respiratory distress  Breath sounds: Normal breath sounds  No wheezing  Abdominal:      General: Bowel sounds are normal  There is no distension  Palpations: Abdomen is soft  Tenderness: There is no abdominal tenderness  Musculoskeletal:         General: No swelling  Cervical back: Neck supple  Skin:     General: Skin is warm and dry     Neurological:      Mental Status: She is alert and oriented to person, place, and time  Cranial Nerves: No cranial nerve deficit  Motor: No weakness  Comments: Decreased sensation on the left face, left arm   Psychiatric:         Mood and Affect: Mood normal           Additional Data:     Lab Results:  Results from last 7 days   Lab Units 09/01/22  1040   WBC Thousand/uL 8 09   HEMOGLOBIN g/dL 13 5   HEMATOCRIT % 41 8   PLATELETS Thousands/uL 241     Results from last 7 days   Lab Units 09/01/22  1040   SODIUM mmol/L 138   POTASSIUM mmol/L 3 6   CHLORIDE mmol/L 104   CO2 mmol/L 30   BUN mg/dL 20   CREATININE mg/dL 0 91   ANION GAP mmol/L 4   CALCIUM mg/dL 9 5   GLUCOSE RANDOM mg/dL 138     Results from last 7 days   Lab Units 09/01/22  1040   INR  1 12     Results from last 7 days   Lab Units 09/01/22  1033   POC GLUCOSE mg/dl 122               Imaging: Reviewed radiology reports from this admission including: CT head  CT stroke alert brain   Final Result by Reyes Naik MD (09/01 1112)      No acute intracranial CT abnormality  No significant interval change                   I personally discussed this study with Dr Denisha Max on 9/1/2022 at 10:48 AM                 Workstation performed: [id number]         CTA stroke alert (head/neck)   Final Result by Reyes Naik MD (09/01 1111)      1  No intracranial large vessel occlusion or critical stenosis  No aneurysm  2   Progression of atherosclerotic disease in the left cervical carotid artery with a new linear intraluminal defect at the origin/proximal ICA suggesting an ulcerated plaque resulting in about 80% stenosis of proximal ICA  3   Stable atherosclerotic change of right cervical carotid artery without hemodynamically significant stenosis                       I personally discussed this study with Dr Denisha Max on 9/1/2022 at 10:48 AM                 Workstation performed: [id number]         MRI Inpatient Order    (Results Pending)       EKG and Other Studies Reviewed on Admission:   · EKG: Normal sinus rhythm, no acute ischemic change  ** Please Note: This note has been constructed using a voice recognition system   **

## 2022-09-01 NOTE — ASSESSMENT & PLAN NOTE
· Patient is on metoprolol succinate 100 mg b i d   · On Cardizem 120 mg as needed in case of palpitations  · On Eliquis for anticoagulation  · Patient took the morning dose of metoprolol, hold this evening for permissive hypertension  · Patient took a dose of Cardizem today

## 2022-09-01 NOTE — CASE MANAGEMENT
Case Management Progress Note    Patient name Marcia Gilbert  Location ED 24/ED 24 MRN [id number]  : 1960 Date 2022       LOS (days): 0  Geometric Mean LOS (GMLOS) (days):   Days to [facility]:        OBJECTIVE:        Current admission status: Inpatient  Preferred Pharmacy:   [address] [place] [address] [address] [place] [address]  Phone: [phone] Fax: [phone]    [facility]ana Th[facility] [address] [facility] [address]  [address] [facility]  Phone: [phone] Fax: [phone]    Primary Care Provider: Cathy Ramirez DO    Primary Insurance: [facility] [place]  Secondary Insurance:     PROGRESS NOTE:      CM attempted to meet with pt at bedside however pt was meeting with provider  CM will attempt at a later time

## 2022-09-01 NOTE — ASSESSMENT & PLAN NOTE
· Yesterday patient developed left-sided facial numbness around midnight, went to bed woke up around 6:00 a m  continue to have left-sided facial numbness, left arm numbness and left leg weakness  · CT head showed no acute intracranial abnormality  · CTA showed no intracranial large vessel occlusion or critical stenosis, no aneurysm    Progression of atherosclerotic disease in the left cervical carotid artery but a new linear intraluminal or defect at the origin/proximal ICA suggesting an ulcerated plaque resulting in about 80% stenosis of proximal ICA  · Admit under stroke pathway  · Neuro checks  · MRI of the brain  · Echo  · Lipid panel/hemoglobin A1c  · Permissive hypertension per Neurology, labetalol if systolic blood pressure is above 180  · Telemetry  · PT OT  · Continue aspirin, Eliquis  · Not tolerating statin

## 2022-09-01 NOTE — ASSESSMENT & PLAN NOTE
- Reports daily mild headaches - uses 1 tab Fioricet 1-2 x per week and daily tylenol use   She also has a history of frontal migraines that progress into left ocular pain with associated left sided jaw numbness, nausea, photophobia and phonophbia   - s/p 25 mg IV Benadryl, 10 mg IV Reglan, and 2 g IV Magnesium; will give another dose of migraine cocktail today x 1   - Monitor neuro exam; notify with any changes

## 2022-09-01 NOTE — SPEECH THERAPY NOTE
Bedside Swallow Evaluation:    Summary:  Pt presented w/ adequate oral and pharyngeal stages of swallowing with all consistencies assessed (puree and regular solids with thin liquids)  Patient admitted on stroke pathway  She experienced left side facial numbness with some slurred speech leading to her admission  She reports feeling almost back to baseline  Speech is clear and intelligible  Language and cognitive skills are adequate  Recommendations:  Diet: regular   Liquid: thin   Meds: whole with water  Supervision: independent   Positioning:Upright  Strategies: n/a  Pt to take PO/Meds only when fully alert and upright  Eval only, No f/u tx indicated  Consider consult w/:  Neurology    H&P/Admit info/ pertinent provider notes: (PMH noted above)  Chief Complaint:  Left facial numbness   History of Present Illness:  Chinedu Conteh is a 57 y o  female with a PMH of paroxysmal atrial fibrillation, hypertension, hyperlipidemia, had a who presents with left facial numbness  Patient was in her your usual state of health yesterday, around midnight she developed left facial numbness which occasional happens with her headache and did not thought about it much  She went to bed woke up around 6:00 a m  continue to have left-sided facial numbness, no facial droop  She felt left arm numbness and left arm weakness  Patient was a stroke alert  CT negative for acute stroke  Admit under stroke pathway  Special Studies:  CT stroke 9/1/2022:   No acute intracranial CT abnormality  No significant interval change    Previous VBS:  None     Did the pt report pain? no  If yes, was nursing notified/was it addressed?     Reason for consult:  New neuro event  Stroke protocol    Precautions:  None     Food allergies:  None   Current diet:  Regular with thin liquids   Premorbid diet[de-identified]  Regular with thin liquids   O2 requirement:  RA  Voice/Speech:  wfl  Follows commands:  wfl                        Cognitive Status:  wfl  Oral [facility] exam:  Dentition: wfl  Lips (VII): wfl  Tongue (XII): wfl  Mandible (V): wfl  Face/oral sensation (V): wfl  Velum (X): wfl  Secretion management: wfl  Volitional cough: n/a    Items administered:  Puree, hard solid, thin liquids   Liquids were taken by straw     Oral stage:  Lip closure: wfl  Mastication: wfl  Bolus formation: wfl  Bolus control: wfl  Transfer: wfl  Oral residue: no  Pocketing: no    Pharyngeal stage:  Swallow promptness: appears adequate  Laryngeal rise: not palpated  Wet voice: no  Throat clear: no  Cough: no  Secondary swallows: no  Audible swallows: no  No overt s/s aspiration    Esophageal stage:  No s/s reported    Results d/w:  Pt

## 2022-09-01 NOTE — PLAN OF CARE
Problem: Potential for Falls  Goal: Patient will remain free of falls  Description: INTERVENTIONS:  - Educate patient/family on patient safety including physical limitations  - Instruct patient to call for assistance with activity   - Consult OT/PT to assist with strengthening/mobility   - Keep Call bell within reach  - Keep bed low and locked with side rails adjusted as appropriate  - Keep care items and personal belongings within reach  - Initiate and maintain comfort rounds  - Make Fall Risk Sign visible to staff  - Offer Toileting every 2 Hours, in advance of need  - Initiate/Maintain bed alarm  - Apply yellow socks and bracelet for high fall risk patients  - Consider moving patient to room near nurses station  Outcome: Progressing     Problem: PAIN - ADULT  Goal: Verbalizes/displays adequate comfort level or baseline comfort level  Description: Interventions:  - Encourage patient to monitor pain and request assistance  - Assess pain using appropriate pain scale  - Administer analgesics based on type and severity of pain and evaluate response  - Implement non-pharmacological measures as appropriate and evaluate response  - Consider cultural and social influences on pain and pain management  - Notify physician/advanced practitioner if interventions unsuccessful or patient reports new pain  Outcome: Progressing     Problem: INFECTION - ADULT  Goal: Absence or prevention of progression during hospitalization  Description: INTERVENTIONS:  - Assess and monitor for signs and symptoms of infection  - Monitor lab/diagnostic results  - Monitor all insertion sites, i e  indwelling lines, tubes, and drains  - Monitor endotracheal if appropriate and nasal secretions for changes in amount and color  - [place] appropriate cooling/warming therapies per order  - Administer medications as ordered  - Instruct and encourage patient and family to use good hand hygiene technique  - Identify and instruct in appropriate isolation precautions for identified infection/condition  Outcome: Progressing  Goal: Absence of fever/infection during neutropenic period  Description: INTERVENTIONS:  - Monitor WBC    Outcome: Progressing     Problem: SAFETY ADULT  Goal: Patient will remain free of falls  Description: INTERVENTIONS:  - Educate patient/family on patient safety including physical limitations  - Instruct patient to call for assistance with activity   - Consult OT/PT to assist with strengthening/mobility   - Keep Call bell within reach  - Keep bed low and locked with side rails adjusted as appropriate  - Keep care items and personal belongings within reach  - Initiate and maintain comfort rounds  - Make Fall Risk Sign visible to staff  - Offer Toileting every 2 Hours, in advance of need  - Initiate/Maintain bed alarm  - Apply yellow socks and bracelet for high fall risk patients  - Consider moving patient to room near nurses station  Outcome: Progressing  Goal: Maintain or return to baseline ADL function  Description: INTERVENTIONS:  -  Assess patient's ability to carry out ADLs; assess patient's baseline for ADL function and identify physical deficits which impact ability to perform ADLs (bathing, care of mouth/teeth, toileting, grooming, dressing, etc )  - Assess/evaluate cause of self-care deficits   - Assess range of motion  - Assess patient's mobility; develop plan if impaired  - Assess patient's need for assistive devices and provide as appropriate  - Encourage maximum independence but intervene and supervise when necessary  - Involve family in performance of ADLs  - Assess for home care needs following discharge   - Consider OT consult to assist with ADL evaluation and planning for discharge  - Provide patient education as appropriate  Outcome: Progressing  Goal: Maintains/Returns to pre admission functional level  Description: INTERVENTIONS:  - Perform BMAT or MOVE assessment daily    - Set and communicate daily mobility goal to care team and patient/family/caregiver  - Collaborate with rehabilitation services on mobility goals if consulted  - Perform Range of Motion 2 times a day  - Reposition patient every 2 hours  - Dangle patient 2 times a day  - Stand patient 2 times a day  - Ambulate patient 2 times a day  - Out of bed to chair 2 times a day   - Out of bed for meals 2 times a day  - Out of bed for toileting  - Record patient progress and toleration of activity level   Outcome: Progressing     Problem: DISCHARGE PLANNING  Goal: Discharge to home or other facility with appropriate resources  Description: INTERVENTIONS:  - Identify barriers to discharge w/patient and caregiver  - Arrange for needed discharge resources and transportation as appropriate  - Identify discharge learning needs (meds, wound care, etc )  - Arrange for interpretive services to assist at discharge as needed  - Refer to Case Management Department for coordinating discharge planning if the patient needs post-hospital services based on physician/advanced practitioner order or complex needs related to functional status, cognitive ability, or social support system  Outcome: Progressing     Problem: Knowledge Deficit  Goal: Patient/family/caregiver demonstrates understanding of disease process, treatment plan, medications, and discharge instructions  Description: Complete learning assessment and assess knowledge base    Interventions:  - Provide teaching at level of understanding  - Provide teaching via preferred learning methods  Outcome: Progressing     Problem: NEUROSENSORY - ADULT  Goal: Achieves stable or improved neurological status  Description: INTERVENTIONS  - Monitor and report changes in neurological status  - Monitor vital signs such as temperature, blood pressure, glucose, and any other labs ordered   - Initiate measures to prevent increased intracranial pressure  - Monitor for seizure activity and implement precautions if appropriate      Outcome: Progressing

## 2022-09-01 NOTE — STROKE DOCUMENTATION
Pt arrived via [facility]  Woke at 0600 this AM with numbness to left hand, seen at PCP office  EMS called

## 2022-09-01 NOTE — ASSESSMENT & PLAN NOTE
· For history of lower extremity swelling patient is on Lasix 40 mg daily  · She had an unremarkable echo 2020  · Repeat echo as part of a stroke pathway  · Hold Lasix due to permissive hypertension, clinically patient euvolemic    No lower extremity swelling

## 2022-09-01 NOTE — ASSESSMENT & PLAN NOTE
57 y o with chronic interstitial cystitis, migraines, afib on Eliquis, HTN, HLD, anxiety/depression, and GERD who presents as a pre hospital stroke alert  Patient went to bed around midnight with mild left facial numbness, and woke up at 0600 this morning with left facial, truncal, arm and leg numbness, as well as left leg weakness  She felt that her speech was "off" and she had a 5/10 headache with nausea  Patient took her Eliquis, Tylenol and Zofran this morning  She then went to her PCP appointment with progressive symptoms, who then alerted EMS  Patient seen by [facility] mobile stroke unit and brought to [facility]  Upon arrival to ED,   NIHSS 1 for sensory deficits  CTH negative for acute intracranial abnormality  CTA head/neck negative for LVO or significant stenosis  She does have stable atherosclerosis of R cervical carotid artery and 80% stenosis of proximal L ICA  While examining the patient in ED, she reported slight improvement in sensory deficits  She reports she had a similar headache on Monday 08/29 with left jaw numbness, however did not seek medical care at that time  Etiology of symptoms could be related to atypical migraine  MRI brain without acute intracranial abnormalities  Today, patient reports continued dull pain behind left eye, left eye blurry vision, and numbness around her left eye but denies any numbness in the rest of her face or any extremities  On exam, she exhibits decreased sensation to pinprick in left UE/LE compared to right  Will give another dose of migraine cocktail x 1  Plan:  - Stroke pathway  - Patient reports daily baby aspirin use   Continue with home Aspirin 81 mg   - Reports myalgias to statins, okay to continue with home medication of Repatha   - Echo pending official report  - Goal normotension  - Euglycemic, normothermic goal  - Recommend outpatient follow up with vascular  - Continue telemetry  - PT/OT/ST  - Secondary risk factor modification    - Stroke education  - Frequent neuro checks  Continue to monitor and notify neurology with any changes   - STAT CT head for any acute change in neuro exam  - Migraine cocktail x 1: Reglan 10 mg IV, magnesium 2 g IV, Benadryl 25 mg IV  - Medical management and supportive care per primary team  Correction of any metabolic or infectious disturbances  Results:  - CT head: No acute intracranial CT abnormality  No significant interval change  - CTA head and neck:  1  No intracranial large vessel occlusion or critical stenosis  No aneurysm  2   Progression of atherosclerotic disease in the left cervical carotid artery with a new linear intraluminal defect at the origin/proximal ICA suggesting an ulcerated plaque resulting in about 80% stenosis of proximal ICA  3   Stable atherosclerotic change of right cervical carotid artery without hemodynamically significant stenosis  - MRI brain: No evidence of acute infarct, intracranial hemorrhage or mass    - A1C 5 6  - Lipid panel: cholesterol 219, triglycerides 200, HDL 50,

## 2022-09-01 NOTE — CONSULTS
Consultation - Stroke   Preethi Barboza 57 y o  female MRN: [id number]  Unit/Bed#: ED 24 Encounter: [id number]      Assessment/Plan     Stroke-like symptoms  Assessment & Plan  57 y o with chronic interstitial cystitis, migraines, afib on Eliquis, HTN, HLD, anxiety/depression, and GERD who presents as a pre hospital stroke alert  Patient went to bed around midnight with mild left facial numbness, and woke up at 0600 this morning with left facial, truncal, arm and leg numbness, as well as left leg weakness  She felt that her speech was "off" and she had a 5/10 headache with nausea  Patient took her Eliquis, Tylenol and Zofran this morning  She then went to her PCP appointment with progressive symptoms, who then alerted EMS  Patient seen by [facility] mobile stroke unit and brought to [facility]  Upon arrival to ED,   NIHSS 1 for sensory deficits  CTH negative for acute intracranial abnormality  CTA head/neck negative for LVO or significant stenosis  She does have stable atherosclerosis of R cervical carotid artery and 80% stenosis of proximal L ICA  While examining the patient in ED, she reported slight improvement in sensory deficits  She reports she had a similar headache on Monday 08/29 with left jaw numbness, however did not seek medical care at that time  Etiology of symptoms could be related to atypical migraine, however cannot rule out acute ischemia  Will place on stroke pathway, and give migraine cocktail for headache  Plan:  - Stroke pathway  - Patient reports daily baby aspirin use   Continue with home Aspirin 81 mg   - Reports myalgias to statins, okay to continue with home medication of Repatha   - MRI brain pending   - Echo pending   - Lipid Panel, Hemoglobin A1c pending   - Permissive HTN, labetalol if SBP >180 per attending neurologist   - Euglycemic, normothermic goal  - Continue telemetry  - PT/OT/ST  - Secondary risk factor modification    - Stroke education  - Frequent neuro checks  Continue to monitor and notify neurology with any changes   - STAT CT head for any acute change in neuro exam  - Medical management and supportive care per primary team  Correction of any metabolic or infectious disturbances  Plan discussed with Attending Neurologist, please see attestation for further input/recommendations  Headache  Assessment & Plan  Reports daily mild headaches - uses 1 tab Fioricet 1-2 x per week and daily tylenol use  She also has a history of frontal migraines that progress into left ocular pain with associated left sided jaw numbness, nausea, photophobia and phonophbia   - s/p 25 mg IV Benadryl, 10 mg IV Reglan, and 2 g IV Magnesium   - Medical management and supportive care per primary team  Correction of any metabolic or infectious disturbances  Thrombolytic Decision: Patient not a candidate  Symptoms resolved/clearly non disabling  and Bleeding risk  Patient took Eliquis this morning      Recommendations for outpatient neurological follow up have yet to be determined  History of Present Illness     Reason for Consult / Principal Problem: Stroke alert   Hx and PE limited by: N/A  Patient last known well: 12 am 09/01/2022  Stroke alert called: 1026  Neurology time of arrival: Immediate   HPI: Malcolm Ace is a 57 y o   female with chronic interstitial cystitis, migraines, afib on Eliquis, HTN, HLD, anxiety/depression, GERD who presents as a pre hospital stroke alert  Patient went to bed around midnight with mild left facial numbness, and woke up at 0600 this morning with left facial, truncal, arm and leg numbness, as well as left leg weakness  She felt that her speech was "off" and she had a 5/10 headache with nausea  Patient took her Eliquis, Tylenol and Zofran this morning  She then went to her PCP appointment with progressive symptoms, who then alerted EMS  Patient [facility] by [facility] mobile stroke unit and brought to [facility]   Upon arrival to ED,   NIHSS 1 for sensory deficits  CTH negative for acute intracranial abnormality  CTA head/neck negative for LVO or significant stenosis  She does have stable atherosclerosis of R cervical carotid artery and 80% stenosis of proximal L ICA  While examining patient in ED, she reported improvement in sensory deficits  Of note - Patient reports daily mild headaches, uses 1 tab Fioricet 1-2 x per week and daily tylenol use  She also has a history of frontal migraines that progress into left ocular pain with associated left sided jaw numbness, nausea, photophobia and phonophbia  She states that her headache today feels "more sharp than normal", and she has new sensory deficits including her trunk/arm/leg  Patient also reports that on Monday 08/29, she had palpitations and racing heart, that progressed into a "knife like" headache with left sided facial numbness while at physical therapy  She took Aspirin and Fioricet but did not seek medical care at that time  She had an appointment with her cardiologist yesterday 8/31, who requested a 2 week Zio monitor for further evaluation  Consult to Neurology  Consult performed by: JUD Ardon  Consult ordered by:  Ying Dye [id number], DO          Review of Systems   Unable to perform ROS: Acuity of condition       Historical Information   Past Medical History:   Diagnosis Date    A-fib [facility]) 03/2020    Allergic     Anxiety     Arthritis     Asthma     Colon polyp     Depression     GERD (gastroesophageal reflux disease)     Heart murmur     Hives     Hyperlipidemia     Hypertension     Infertility, female     Migraine     Palpitations     Psychiatric disorder     Wears glasses      Past Surgical History:   Procedure Laterality Date    COLONOSCOPY      EGD      EXPLORATORY LAPAROTOMY      for infertility    HAND SURGERY      Hand excision of tendon cyst    CA LAP,APPENDECTOMY N/A 5/3/2022    Procedure: APPENDECTOMY LAPAROSCOPIC;  Surgeon: Isaiah Wilde MD;  Location: [facility];  Service: General    SUPERIOR MESTENTERIC ARTERY STENT      WISDOM TOOTH EXTRACTION       Social History   Social History     Substance and Sexual Activity   Alcohol Use No     Social History     Substance and Sexual Activity   Drug Use No     E-Cigarette/Vaping    E-Cigarette Use Never User      E-Cigarette/Vaping Substances    Nicotine No     THC No     CBD No     Flavoring No     Other No     Unknown No      Social History     Tobacco Use   Smoking Status Former Smoker    Packs/day: 0 50    Years: 10 00    Pack years: 5 00    Quit date: 2019    Years since quitting: 3 6   Smokeless Tobacco Never Used     Family History:   Family History   Problem Relation Age of Onset    Lung cancer Mother 61    Brain cancer Mother 61    Diabetes Father     Depression Father     Other Father         septic    Allergies Sister     Hashimoto's thyroiditis Sister     Abdominal aortic aneurysm Sister     Diabetes Sister     No Known Problems Sister     No Known Problems Maternal Grandmother     No Known Problems Maternal Grandfather     No Known Problems Paternal Grandmother     No Known Problems Paternal Grandfather     Hodgkin's lymphoma Brother     No Known Problems Brother     No Known Problems Maternal Aunt     Diabetes Other        Review of previous medical records was completed  Meds/Allergies   all current active meds have been reviewed, current meds:   Current Facility-Administered Medications   Medication Dose Route Frequency    cyanocobalamin injection 1,000 mcg  1,000 mcg Intramuscular Q30 Days    magnesium sulfate 2 g/50 mL IVPB (premix) 2 g  2 g Intravenous Once    and PTA meds:   Prior to Admission Medications   Prescriptions Last Dose Informant Patient Reported? Taking?    Carafate 1 GM/10ML suspension  Self No No   Sig: Take 10 mL (1 g total) by mouth 4 (four) times a day   Cholecalciferol 25 MCG (1000 UT) tablet  Self Yes No   Sig: Take 1,000 Units by mouth daily   ELDERBERRY PO   Yes No   Sig: Take by mouth daily   Evolocumab 140 MG/ML SOAJ  Self No No   Sig: Inject 1 mL (140 mg total) under the skin every 14 (fourteen) days   Ginger, Zingiber officinalis, (GINGER PO)  Self Yes No   Sig: Take 1 tablet by mouth in the morning   Heparin Sodium, Porcine, (heparin, porcine,) 64152 UNIT/ML  Self Yes No   LORazepam (ATIVAN) 0 5 mg tablet   No No   Sig: Take 1 tablet (0 5 mg total) by mouth every 8 (eight) hours as needed for anxiety   MULTIPLE VITAMIN PO  Self Yes No   Sig: Take by mouth   Meth-Hyo-M Bl-Na Phos-Ph Sal (Uribel) 118 MG CAPS  Self Yes No   Sig: Take 1 each by mouth Three times a day   Na Sulfate-K Sulfate-Mg Sulf (Suprep Bowel Prep Kit) 17 5-3 13-1 6 GM/177ML SOLN   No No   Sig: Take 1 Bottle by mouth once for 1 dose   Probiotic Product (PROBIOTIC-10 PO)  Self Yes No   Sig: Take 1 tablet by mouth in the morning   Turmeric 500 MG CAPS  Self Yes No   Zinc 100 MG TABS  Self Yes No   Sig: Take by mouth daily   acetaminophen (TYLENOL) 325 mg tablet   Yes No   Sig: Take 650 mg by mouth as needed for mild pain   apixaban (Eliquis) 5 mg  Self No No   Sig: Take 1 tablet (5 mg total) by mouth 2 (two) times a day   azelastine (OPTIVAR) 0 05 % ophthalmic solution  Self No No   Sig: Administer 1 drop to both eyes 2 (two) times a day   azelastine (OPTIVAR) 0 05 % ophthalmic solution   No No   Sig: Administer 1 drop to both eyes 2 (two) times a day   butalbital-acetaminophen-caffeine (FIORICET,ESGIC) -40 mg per tablet   No No   Sig: Take 1 tablet by mouth every 6 (six) hours as needed for headaches   cephalexin (KEFLEX) 250 mg capsule  Self Yes No   Si mg   cetirizine (ZyrTEC) 10 mg tablet  Self No No   Sig: Take 1 tablet (10 mg total) by mouth daily for 365 doses   chlordiazepoxide-clidinium (LIBRAX) 5-2 5 mg per capsule  Self No No   Sig: Take 1 capsule by mouth 2 (two) times a day before meals   chlorhexidine (PERIDEX) 0 12 % solution Self Yes No   clotrimazole-betamethasone (LOTRISONE) 1-0 05 % cream  Self Yes No   dexlansoprazole (DEXILANT) 60 MG capsule  Self No No   Sig: Take 1 capsule (60 mg total) by mouth daily Please take on an empty stomach 30 minutes before eating   diltiazem (CARDIZEM) 120 MG tablet   Yes No   Sig: Take 120 mg by mouth if needed (For heart palpitations)   fexofenadine (ALLEGRA) 180 MG tablet   No No   Sig: Take 1 tablet (180 mg total) by mouth daily IN AM   fluconazole (DIFLUCAN) 150 mg tablet   Yes No   fluticasone-vilanterol (BREO ELLIPTA) 200-25 MCG/INH inhaler  Self No No   Sig: Inhale 1 puff daily Rinse mouth after use  furosemide (LASIX) 40 mg tablet  Self No No   Sig: Take 1 tablet (40 mg total) by mouth daily   hydrocortisone-pramoxine (PROCTOFOAM-HC) 1-1 % FOAM rectal foam  Self No No   Sig: Insert 1 applicator into the rectum 2 (two) times a day   ipratropium (ATROVENT) 0 06 % nasal spray  Self Yes No   ketoconazole (NIZORAL) 2 % cream  Self No No   Sig: Apply topically daily   linaCLOtide (Linzess) 145 MCG CAPS   No No   Sig: Take 1 capsule (145 mcg total) by mouth in the morning   metoprolol succinate (TOPROL-XL) 100 mg 24 hr tablet  Self No No   Sig: Take 1 tablet (100 mg total) by mouth 2 (two) times a day   montelukast (SINGULAIR) 10 mg tablet   No No   Sig: Take 1 tablet (10 mg total) by mouth daily at bedtime   naloxone (NARCAN) 4 mg/0 1 mL nasal spray  Self No No   Sig: Administer 1 spray into a nostril  If no response after 2-3 minutes, give another dose in the other nostril using a new spray     nitrofurantoin (MACROBID) 100 mg capsule   Yes No   nystatin (MYCOSTATIN) powder  Self No No   Sig: Apply topically three times a weekly to affected area   omeprazole (PriLOSEC) 40 MG capsule  Self No No   Sig: Take 1 capsule (40 mg total) by mouth daily   ondansetron (Zofran ODT) 4 mg disintegrating tablet   No No   Sig: Take 1 tablet (4 mg total) by mouth every 6 (six) hours as needed for nausea or vomiting   phenazopyridine (PYRIDIUM) 200 mg tablet   No No   Sig: Take 1 tablet (200 mg total) by mouth 3 (three) times a day with meals   pimecrolimus (ELIDEL) 1 % cream  Self No No   Sig: Apply topically to rash on eyelids and hands once or twice a daily  polyethylene glycol (GOLYTELY) 4000 mL solution   No No   Sig: Take 4,000 mL by mouth once for 1 dose Please follow instruction sheet from the office   spironolactone (ALDACTONE) 25 mg tablet   No No   Sig: TAKE ONE TABLET BY MOUTH TWICE DAILY   sucralfate (CARAFATE) 1 g tablet  Self No No   Sig: TAKE ONE TABLET BY MOUTH FOUR TIMES DAILY   traMADol (ULTRAM) 50 mg tablet   No No   Sig: Take 1 tablet (50 mg total) by mouth 3 (three) times a day as needed for severe pain   triamcinolone (KENALOG) 0 1 % cream  Self No No   Sig: Apply topically 2 (two) times a day   triamcinolone (KENALOG) 0 1 % cream  Self No No   Sig: Apply topically 2 (two) times a day   triamcinolone acetonide (KENALOG-40) 40 mg/mL  Self Yes No      Facility-Administered Medications Last Administration Doses Remaining   cyanocobalamin injection 1,000 mcg 8/24/2022  2:12 PM           Allergies   Allergen Reactions    Ace Inhibitors Angioedema and Anaphylaxis     Anaphylaxis    Hydralazine Other (See Comments)     Lip swelling  Flank pain    Other Anaphylaxis and Other (See Comments)     Other reaction(s): angioadema  Other reaction(s): Other (See Comments)  Preservatives- itching throat closes, hi  Other reaction(s): angioadema  E Z Cat - hives throat closes itching,  Preservatives- itching throat closes, hi  Artificial sweeteners    Valsartan Angioedema     Lips, face swollen    Ampicillin Hives     Depends on brand some preservatives can react    Benicar [Olmesartan] Angioedema     See Allergy note from 9/11/2008    Swollen ankles/legs    Sulfa Antibiotics Other (See Comments)     stuffiness,itching,hives,throat closing       Objective   Vitals:Blood pressure 150/65, pulse 64, temperature 98 °F (36 7 °C), temperature source Oral, resp  rate (!) 24, weight 92 7 kg (204 lb 5 9 oz), SpO2 92 %, not currently breastfeeding  ,Body mass index is 35 08 kg/m²  No intake or output data in the 24 hours ending 09/01/22 1226    Invasive Devices: Invasive Devices  Report    Peripheral Intravenous Line  Duration           Peripheral IV 09/01/22 Right Antecubital <1 day              Performed by both attending neurologist and AP   Physical Exam  Vitals reviewed  Constitutional:       General: She is not in acute distress  Appearance: Normal appearance  She is obese  She is not ill-appearing  HENT:      Head: Normocephalic and atraumatic  Right Ear: External ear normal       Left Ear: External ear normal       Nose: Nose normal       Mouth/Throat:      Mouth: Mucous membranes are moist    Eyes:      General:         Right eye: No discharge  Left eye: No discharge  Extraocular Movements: Extraocular movements intact and EOM normal       Conjunctiva/sclera: Conjunctivae normal       Pupils: Pupils are equal, round, and reactive to light  Cardiovascular:      Rate and Rhythm: Normal rate  Pulmonary:      Effort: Pulmonary effort is normal  No respiratory distress  Musculoskeletal:         General: Normal range of motion  Cervical back: Normal range of motion  No rigidity  Right lower leg: No edema  Left lower leg: No edema  Skin:     General: Skin is warm and dry  Findings: No rash  Neurological:      Mental Status: She is alert and oriented to person, place, and time  Sensory: Sensory deficit present  Motor: No weakness  Coordination: Finger-Nose-Finger Test and Heel to [employer] normal    Psychiatric:         Mood and Affect: Mood normal          Speech: Speech normal          Behavior: Behavior normal        Neurologic Exam     Mental Status   Oriented to person, place, and time  Follows 2 step commands     Attention: normal  Concentration: normal    Speech: speech is normal   Level of consciousness: alert  Knowledge: good  Able to name object  Able to read  Able to repeat  Normal comprehension  Cranial Nerves     CN II   Visual fields full to confrontation  CN III, IV, VI   Pupils are equal, round, and reactive to light  Extraocular motions are normal    Nystagmus: none   Diplopia: none  Conjugate gaze: present    CN VII   Facial expression full, symmetric  CN VIII   CN VIII normal    Hearing: intact    CN IX, X   CN IX normal      CN XI   CN XI normal      CN XII   CN XII normal      Decreased sharp sensation on Left cheek, however throughout exam resolved  Motor Exam   Muscle bulk: normal  Overall muscle tone: normal  Right arm pronator drift: absent  Left arm pronator drift: absent    Strength   Strength 5/5 except as noted  Sensory Exam     Decreased light sensation on left upper and lower extremity  Normal pinprick sensation throughout all extremities  Gait, Coordination, and Reflexes     Gait  Gait: (deferred for safety)    Coordination   Finger to nose coordination: normal  Heel to shin coordination: normal    Tremor   Resting tremor: absent  Intention tremor: absent  Action tremor: absent      NIHSS:  1a Level of Consciousness: 0 = Alert   1b  LOC Questions: 0 = Answers both correctly   1c  LOC Commands: 0 = Obeys both correctly   2  Best Gaze: 0 = Normal   3  Visual: 0 = No visual field loss   4  Facial Palsy: 0=Normal symmetric movement   5a  Motor Right Arm: 0=No drift, limb holds 90 (or 45) degrees for full 10 seconds   5b  Motor Left Arm: 0=No drift, limb holds 90 (or 45) degrees for full 10 seconds   6a  Motor Right Le=No drift, limb holds 90 (or 45) degrees for full 10 seconds   6b  Motor Left Le=No drift, limb holds 90 (or 45) degrees for full 10 seconds   7  Limb Ataxia:  0=Absent   8   Sensory: 1=Mild to moderate sensory loss; patient feels pinprick is less sharp or is dull on the affected side; there is a loss of superficial pain with pinprick but patient is aware She is being touched   9  Best Language:  0=No aphasia, normal   10  Dysarthria: 0=Normal articulation   11  Extinction and Inattention (formerly Neglect): 0=No abnormality   Total Score: 1     Time NIHSS was completed: after CTH/ CTA     Modified Clyo Score:  0 (No baseline symptoms/disability)    Lab Results:   I have personally reviewed pertinent reports  , CBC:   Results from last 7 days   Lab Units 09/01/22  1040   WBC Thousand/uL 8 09   RBC Million/uL 4 55   HEMOGLOBIN g/dL 13 5   HEMATOCRIT % 41 8   MCV fL 92   PLATELETS Thousands/uL 241   Imaging Studies: I have personally reviewed pertinent reports  and I have personally reviewed pertinent films in PACS [facility] head[facility]neck  EKG, Pathology, and Other Studies: I have personally reviewed pertinent reports     and I have personally reviewed pertinent films in PACS  VTE Prophylaxis: Patient currently in ED     Code Status: Prior

## 2022-09-01 NOTE — ED PROVIDER NOTES
Emergency Department Note- Tami Medel 57 y o  female MRN: [id number]    Unit/Bed#: ED 24 Encounter: [id number]        History of Present Illness     Patient is a 73-year-old female with a history of atrial fibrillation on Eliquis, last dose this morning, went to bed at midnight last night, woke this morning about 6:00 a m  And noticed that she had which she thought was some difficulty speaking and some decreased sensation in the left face, left arm, left body and left leg  No difficulty moving arms or legs, had a slight headache earlier  Went to urgent care, EMS activated, patient seen by the [facility] mobile stroke unit, I spoke with them as well as the stroke neurologist who indicated that the patient had a NIH shows scale of 1 on his assessment due to decreased sensation, no noted aphasia or dysarthria, a noncontrast head CT showed no bleed or mass  Patient says overall she feels like she is getting a little bit better, and on my assessment her left leg does not have a decreased sensation      REVIEW OF SYSTEMS     Constitutional:  No recent weight  gains or losses   Eyes:  No visual changes   ENT:  No tinnitus or hearing changes   Cardiac: No chest pain or palpitations   Respiratory:  No cough or shortness of breath   Abdominal:  No nausea or vomiting   Urinary: No dysuria or hematuria   Hematologic:  Easy bruising secondary to oral anticoagulation   Skin: No rash   Musculoskeletal: No aches or pains   Neurologic: As per HPI   Psychiatric: No mood changes      Historical Information   Past Medical History:   Diagnosis Date    A-fib ([facility] ) 03/2020    Allergic     Anxiety     Arthritis     Asthma     Colon polyp     Depression     GERD (gastroesophageal reflux disease)     Heart murmur     Hives     Hyperlipidemia     Hypertension     Infertility, female     Migraine     Palpitations     Psychiatric disorder     Wears glasses      Past Surgical History:   Procedure Laterality Date    COLONOSCOPY      EGD EXPLORATORY LAPAROTOMY      for infertility    HAND SURGERY      Hand excision of tendon cyst    DC LAP,APPENDECTOMY N/A 5/3/2022    Procedure: APPENDECTOMY LAPAROSCOPIC;  Surgeon:  Jon Armijo MD;  Location: [facility];  Service: General    SUPERIOR [address] STENT      WISDOM TOOTH EXTRACTION       Social History   Social History     Substance and Sexual Activity   Alcohol Use No     Social History     Substance and Sexual Activity   Drug Use No     Social History     Tobacco Use   Smoking Status Former Smoker    Packs/day: 0 50    Years: 10 00    Pack years: 5 00    Quit date: 2019    Years since quitting: 3 6   Smokeless Tobacco Never Used     Family History:   Family History   Problem Relation Age of Onset    Lung cancer Mother 61    Brain cancer Mother 61    Diabetes Father     Depression Father     Other Father         septic    Allergies Sister     Hashimoto's thyroiditis Sister     Abdominal aortic aneurysm Sister     Diabetes Sister     No Known Problems Sister     No Known Problems Maternal Grandmother     No Known Problems Maternal Grandfather     No Known Problems Paternal Grandmother     No Known Problems Paternal Grandfather     Hodgkin's lymphoma Brother     No Known Problems Brother     No Known Problems Maternal Aunt     Diabetes Other        MEDICATIONS:  Current Facility-Administered Medications on File Prior to Encounter   Medication Dose Route Frequency Provider Last Rate Last Admin    cyanocobalamin injection 1,000 mcg  1,000 mcg Intramuscular Q30 Days Raegan Jara DO   1,000 mcg at 08/24/22 1412     Current Outpatient Medications on File Prior to Encounter   Medication Sig Dispense Refill    acetaminophen (TYLENOL) 325 mg tablet Take 650 mg by mouth as needed for mild pain      apixaban (Eliquis) 5 mg Take 1 tablet (5 mg total) by mouth 2 (two) times a day 180 tablet 5    azelastine (OPTIVAR) 0 05 % ophthalmic solution Administer 1 drop to both eyes 2 (two) times a day 6 mL 12 azelastine (OPTIVAR) 0 05 % ophthalmic solution Administer 1 drop to both eyes 2 (two) times a day 6 mL 12    butalbital-acetaminophen-caffeine (FIORICET,ESGIC) -40 mg per tablet Take 1 tablet by mouth every 6 (six) hours as needed for headaches 30 tablet 0    Carafate 1 GM/10ML suspension Take 10 mL (1 g total) by mouth 4 (four) times a day 3600 mL 1    cephalexin (KEFLEX) 250 mg capsule 500 mg      cetirizine (ZyrTEC) 10 mg tablet Take 1 tablet (10 mg total) by mouth daily for 365 doses 30 tablet 11    chlordiazepoxide-clidinium (LIBRAX) 5-2 5 mg per capsule Take 1 capsule by mouth 2 (two) times a day before meals 60 capsule 5    chlorhexidine (PERIDEX) 0 12 % solution       Cholecalciferol 25 MCG (1000 UT) tablet Take 1,000 Units by mouth daily      clotrimazole-betamethasone (LOTRISONE) 1-0 05 % cream       dexlansoprazole (DEXILANT) 60 MG capsule Take 1 capsule (60 mg total) by mouth daily Please take on an empty stomach 30 minutes before eating 30 capsule 3    diltiazem (CARDIZEM) 120 MG tablet Take 120 mg by mouth if needed (For heart palpitations)      ELDERBERRY PO Take by mouth daily      Evolocumab 140 MG/ML SOAJ Inject 1 mL (140 mg total) under the skin every 14 (fourteen) days 2 mL 5    fexofenadine (ALLEGRA) 180 MG tablet Take 1 tablet (180 mg total) by mouth daily IN AM 30 tablet 12    fluconazole (DIFLUCAN) 150 mg tablet       fluticasone-vilanterol (BREO ELLIPTA) 200-25 MCG/INH inhaler Inhale 1 puff daily Rinse mouth after use   3 Inhaler 3    furosemide (LASIX) 40 mg tablet Take 1 tablet (40 mg total) by mouth daily 90 tablet 3    Ginger, Zingiber officinalis, (GINGER PO) Take 1 tablet by mouth in the morning      Heparin Sodium, Porcine, (heparin, porcine,) 07427 UNIT/ML       hydrocortisone-pramoxine (PROCTOFOAM-HC) 1-1 % FOAM rectal foam Insert 1 applicator into the rectum 2 (two) times a day 10 g 2    ipratropium (ATROVENT) 0 06 % nasal spray       ketoconazole (NIZORAL) 2 % cream Apply topically daily 60 g 2    linaCLOtide (Linzess) 145 MCG CAPS Take 1 capsule (145 mcg total) by mouth in the morning 30 capsule 2    LORazepam (ATIVAN) 0 5 mg tablet Take 1 tablet (0 5 mg total) by mouth every 8 (eight) hours as needed for anxiety 90 tablet 0    Meth-Hyo-M Bl-Na Phos-Ph Sal (Uribel) 118 MG CAPS Take 1 each by mouth Three times a day      metoprolol succinate (TOPROL-XL) 100 mg 24 hr tablet Take 1 tablet (100 mg total) by mouth 2 (two) times a day 180 tablet 2    montelukast (SINGULAIR) 10 mg tablet Take 1 tablet (10 mg total) by mouth daily at bedtime 90 tablet 0    MULTIPLE VITAMIN PO Take by mouth      Na Sulfate-K Sulfate-Mg Sulf (Suprep Bowel Prep Kit) 17 5-3 13-1 6 GM/177ML SOLN Take 1 Bottle by mouth once for 1 dose 177 mL 0    naloxone (NARCAN) 4 mg/0 1 mL nasal spray Administer 1 spray into a nostril  If no response after 2-3 minutes, give another dose in the other nostril using a new spray  1 each 1    nitrofurantoin (MACROBID) 100 mg capsule       nystatin (MYCOSTATIN) powder Apply topically three times a weekly to affected area 60 g 3    omeprazole (PriLOSEC) 40 MG capsule Take 1 capsule (40 mg total) by mouth daily 309 capsule 4    ondansetron (Zofran ODT) 4 mg disintegrating tablet Take 1 tablet (4 mg total) by mouth every 6 (six) hours as needed for nausea or vomiting 60 tablet 0    phenazopyridine (PYRIDIUM) 200 mg tablet Take 1 tablet (200 mg total) by mouth 3 (three) times a day with meals 10 tablet 0    pimecrolimus (ELIDEL) 1 % cream Apply topically to rash on eyelids and hands once or twice a daily   100 g 3    polyethylene glycol (GOLYTELY) 4000 mL solution Take 4,000 mL by mouth once for 1 dose Please follow instruction sheet from the office 4000 mL 0    Probiotic Product (PROBIOTIC-10 PO) Take 1 tablet by mouth in the morning      spironolactone (ALDACTONE) 25 mg tablet TAKE ONE TABLET BY MOUTH TWICE DAILY 180 tablet 0    sucralfate (CARAFATE) 1 g tablet TAKE ONE TABLET BY MOUTH FOUR TIMES DAILY 360 tablet 0    traMADol (ULTRAM) 50 mg tablet Take 1 tablet (50 mg total) by mouth 3 (three) times a day as needed for severe pain 90 tablet 0    triamcinolone (KENALOG) 0 1 % cream Apply topically 2 (two) times a day 30 g 1    triamcinolone (KENALOG) 0 1 % cream Apply topically 2 (two) times a day 453 6 g 0    triamcinolone acetonide (KENALOG-40) 40 mg/mL       Turmeric 500 MG CAPS       Zinc 100 MG TABS Take by mouth daily       ALLERGIES:  Allergies   Allergen Reactions    Ace Inhibitors Angioedema and Anaphylaxis     Anaphylaxis    Hydralazine Other (See Comments)     Lip swelling  Flank pain    Other Anaphylaxis and Other (See Comments)     Other reaction(s): angioadema  Other reaction(s): Other (See Comments)  Preservatives- itching throat closes, hi  Other reaction(s): angioadema  E Z Cat - hives throat closes itching,  Preservatives- itching throat closes, hi  Artificial sweeteners    Valsartan Angioedema     Lips, face swollen    Ampicillin Hives     Depends on brand some preservatives can react    Benicar [Olmesartan] Angioedema     See Allergy note from 9/11/2008  Swollen ankles/legs    Sulfa Antibiotics Other (See Comments)     stuffiness,itching,hives,throat closing       Vitals:    09/01/22 1100 09/01/22 1115 09/01/22 1130 09/01/22 1133   BP: (!) 197/82 (!) 200/84 (!) 208/94    TempSrc:    Oral   Pulse: 67 66 66    Resp: 16 (!) 32 (!) 27    Patient Position - Orthostatic VS:  Sitting     Temp:    98 °F (36 7 °C)       PHYSICAL EXAM    General:  Patient is well-appearing  Head:  Atraumatic  Eyes:  Conjunctiva pink, Extraocular muscle intact, PERRL  ENT:  Mucous membranes are moist  Neck:  Supple  Cardiac:  S1-S2, without murmurs  Lungs:  Clear to auscultation bilaterally  Abdomen:  Soft, nontender, normal bowel sounds, no CVA tenderness, no tympany, no rigidity, no guarding  Extremities:  Normal range of motion  Neurologic:  Awake, fluent speech, normal comprehension  AAOx3   Cranial nerves 2-12 are intact, strength is 5/5 in the bilateral upper & lower extremities, no slurred speech, no facial droop, no deficit on finger-to-nose testing, no pronator drift  Sensation to light touch is decreased on the left face, left arm, body and upper left thigh, equal and symmetric from the mid thigh distally  No deficit on heel-to-shin testing  Skin:  Pink warm and dry, no rash  Psychiatric:  Alert, pleasant, cooperative            Labs Reviewed   PROTIME-INR - Abnormal       Result Value Ref Range Status    Protime 14 6 (*) 11 6 - 14 5 seconds Final    INR 1 12  0 84 - 1 19 Final   CBC AND PLATELET - Normal    WBC 8 09  4 31 - 10 16 Thousand/uL Final    RBC 4 55  3 81 - 5 12 Million/uL Final    Hemoglobin 13 5  11 5 - 15 4 g/dL Final    Hematocrit 41 8  34 8 - 46 1 % Final    MCV 92  82 - 98 fL Final    MCH 29 7  26 8 - 34 3 pg Final    MCHC 32 3  31 4 - 37 4 g/dL Final    RDW 13 0  11 6 - 15 1 % Final    Platelets 435  599 - 390 Thousands/uL Final    MPV 9 1  8 9 - 12 7 fL Final   APTT - Normal    PTT 34  23 - 37 seconds Final    Comment: Therapeutic Heparin Range =  60-90 seconds   HS TROPONIN I 0HR - Normal    hs TnI 0hr 8  "Refer to ACS Flowchart"- see link ng/L Final    Comment:                                              Initial (time 0) result  If >=50 ng/L, Myocardial injury suggested ;  Type of myocardial injury and treatment strategy  to be determined  If 5-49 ng/L, a delta result at 2 hours and or 4 hours will be needed to further evaluate  If <4 ng/L, and chest pain has been >3 hours since onset, patient may qualify for discharge based on the HEART score in the ED  If <5 ng/L and <3hours since onset of chest pain, a delta result at 2 hours will be needed to further evaluate  HS Troponin 99th Percentile URL of a Health Population=12 ng/L with a 95% Confidence Interval of 8-18 ng/L      Second Troponin (time 2 hours)  If calculated delta >= 20 ng/L,  Myocardial injury suggested ; Type of myocardial injury and treatment strategy to be determined  If 5-49 ng/L and the calculated delta is 5-19 ng/L, consult medical service for evaluation  Continue evaluation for ischemia on ecg and other possible etiology and repeat hs troponin at 4 hours  If delta is <5 ng/L at 2 hours, consider discharge based on risk stratification via the HEART score (if in ED), or KERRI risk score in IP/Observation  HS Troponin 99th Percentile URL of a Health Population=12 ng/L with a 95% Confidence Interval of 8-18 ng/L  POCT GLUCOSE - Normal    POC Glucose 122  65 - 140 mg/dl Final   BASIC METABOLIC PANEL    Sodium 629  135 - 147 mmol/L Final    Potassium 3 6  3 5 - 5 3 mmol/L Final    Chloride 104  96 - 108 mmol/L Final    CO2 30  21 - 32 mmol/L Final    ANION GAP 4  4 - 13 mmol/L Final    BUN 20  5 - 25 mg/dL Final    Creatinine 0 91  0 60 - 1 30 mg/dL Final    Comment: Standardized to IDMS reference method    Glucose 138  65 - 140 mg/dL Final    Comment: If the patient is fasting, the ADA then defines impaired fasting glucose as > 100 mg/dL and diabetes as > or equal to 123 mg/dL  Specimen collection should occur prior to Sulfasalazine administration due to the potential for falsely depressed results  Specimen collection should occur prior to Sulfapyridine administration due to the potential for falsely elevated results      Calcium 9 5  8 3 - 10 1 mg/dL Final    eGFR 66  ml/min/1 73sq m Final    Narrative:     [place] guidelines for Chronic Kidney Disease (CKD):                     Stage 1 with normal or high GFR (GFR > 90 mL/min/1 73 square meters)                    Stage 2 Mild CKD (GFR = 60-89 mL/min/1 73 square meters)                    Stage 3A Moderate CKD (GFR = 45-59 mL/min/1 73 square meters)                    Stage 3B Moderate CKD (GFR = 30-44 mL/min/1 73 square meters)                    Stage 4 Severe CKD (GFR = 15-29 mL/min/1 73 square meters)                    Stage 5 End Stage CKD (GFR <15 mL/min/1 73 square meters)                  Note: GFR calculation is accurate only with a steady state creatinine   HS TROPONIN I 2HR       Medications   LORazepam (ATIVAN) tablet 1 mg (has no administration in time range)   diphenhydrAMINE (BENADRYL) injection 25 mg (has no administration in time range)   metoclopramide (REGLAN) injection 10 mg (has no administration in time range)   magnesium sulfate 2 g/50 mL IVPB (premix) 2 g (has no administration in time range)   iohexol (OMNIPAQUE) 350 MG/ML injection (MULTI-DOSE) 85 mL (85 mL Intravenous Given 9/1/22 1040)       CT stroke alert brain   Final Result      No acute intracranial CT abnormality  No significant interval change                   I personally discussed this study with Dr Deni Joy on 9/1/2022 at 10:48 AM                 Workstation performed: [id number]         CTA stroke alert (head/neck)   Final Result      1  No intracranial large vessel occlusion or critical stenosis  No aneurysm  2   Progression of atherosclerotic disease in the left cervical carotid artery with a new linear intraluminal defect at the origin/proximal ICA suggesting an ulcerated plaque resulting in about 80% stenosis of proximal ICA  3   Stable atherosclerotic change of right cervical carotid artery without hemodynamically significant stenosis                       I personally discussed this study with Dr Deni Joy on 9/1/2022 at 10:48 AM                 Workstation performed: [id number]             ED Course as of 09/01/22 1148   Thu Sep 01, 2022   1038 Patient seen and evaluated by Dr Lynne Liu, stroke neurologist, indicates no tPA given patient on Eliquis   1055 ECG interpreted by me, sinus rhythm, rate of 63, LVH by voltage with repolarization abnormalities, no acute changes, no acute change from May 4, 2022   1141 Dr Lynne Liu request the patient be given a dose of 1 mg oral Ativan secondary to patient anxiety       Assessment/Plan ED Medical Decision Making:     Stroke Assessment       Row Name 09/01/22 1030             NIH Stroke Scale    Interval Baseline      Level of Consciousness (1a ) 0      LOC Questions (1b ) 0      LOC Commands (1c ) 0      Best Gaze (2 ) 0      Visual (3 ) 0      Facial Palsy (4 ) 0      Motor Arm, Left (5a ) 0      Motor Arm, Right (5b ) 0      Motor Leg, Left (6a ) 0      Motor Leg, Right (6b ) 0      Limb Ataxia (7 ) 0      Sensory (8 ) 1      Best Language (9 ) 0      Dysarthria (10 ) 0      Extinction and Inattention (11 ) (Formerly Neglect) 0      Total 1                    Flowsheet Row Most Recent Value   Thrombolytic Decision Options    Thrombolytic Decision Patient not a candidate  Patient is not a candidate options comment  [on Eliquis, took this morning]              No indication for tPA, no signs of acute large vessel occlusion  Patient stable for admission and further workup along stroke pathway  Dr Sarah Banks from Stroke Neurology request the patient be admitted to medical service        Time reflects when diagnosis was documented in both MDM as applicable and the Disposition within this note       Time User Action Codes Description Comment    9/1/2022 10:28 AM Edie Vo Add [R29 90] Stroke-like symptoms           ED Disposition       ED Disposition   Admit    Condition   Stable    Date/Time   Thu Sep 1, 2022 11:43 AM    Comment   Case was discussed with Dr Lord Akbar and the patient's admission status was agreed to be Admission Status: inpatient status to the service of Dr Lord Akbar                  Follow-up Information    None         New Prescriptions    No medications on file            Cassia Clement DO  09/01/22 7902

## 2022-09-01 NOTE — PROGRESS NOTES
Assessment/Plan:  911 called for emergency ambulance  Patient potential having acute CVA  The patient taken to [facility] for further evaluation  Patient will be followed up after discharge  Diagnoses and all orders for this visit:    Acute CVA (cerebrovascular accident) ([facility] )    Benign essential hypertension    Paroxysmal atrial fibrillation ([facility] )    Peripheral vascular disease ([facility] )    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HCC)            Subjective:        Patient ID: Amie Marques is a 57 y o  female  Patient is here with numbness and tingling left face and left upper arm and leg with some facial droop beginning at 5:00 a m  this morning  Patient did have similar symptoms on Monday which resolved by itself  Patient with recurrence  No chest pain or shortness of breath  No fevers or chills  The following portions of the patient's history were reviewed and updated as appropriate: allergies, current medications, past family history, past medical history, past social history, past surgical history and problem list       Review of Systems   Constitutional: Negative  HENT: Negative  Eyes: Negative  Respiratory: Negative  Cardiovascular: Negative  Gastrointestinal: Negative  Endocrine: Negative  Genitourinary: Negative  Musculoskeletal: Negative  Skin: Negative  Allergic/Immunologic: Negative  Neurological: Positive for facial asymmetry, weakness and numbness  Negative for dizziness and headaches  Hematological: Negative  Psychiatric/Behavioral: Negative  Objective:               /88 (BP Location: Right arm, Patient Position: Sitting, Cuff Size: Standard)   Pulse 62   Temp (!) 97 3 °F (36 3 °C) (Temporal)   Ht 5' 4" (1 626 m)   Wt 89 8 kg (198 lb)   LMP  (LMP Unknown)   SpO2 98%   BMI 33 99 kg/m²          Physical Exam  Vitals and nursing note reviewed  Constitutional:       General: She is in acute distress        Appearance: She is not ill-appearing, toxic-appearing or diaphoretic  HENT:      Head: Normocephalic and atraumatic  Right Ear: Tympanic membrane, ear canal and external ear normal  There is no impacted cerumen  Left Ear: Tympanic membrane, ear canal and external ear normal  There is no impacted cerumen  Nose: Nose normal  No congestion or rhinorrhea  Mouth/Throat:      Mouth: Mucous membranes are moist       Pharynx: No oropharyngeal exudate or posterior oropharyngeal erythema  Eyes:      General: No scleral icterus  Right eye: No discharge  Left eye: No discharge  Cardiovascular:      Rate and Rhythm: Normal rate and regular rhythm  Pulses: Normal pulses  Heart sounds: Normal heart sounds  No murmur heard  No friction rub  No gallop  Pulmonary:      Effort: Pulmonary effort is normal  No respiratory distress  Breath sounds: Normal breath sounds  No stridor  No wheezing, rhonchi or rales  Chest:      Chest wall: No tenderness  Musculoskeletal:         General: No swelling, tenderness, deformity or signs of injury  Normal range of motion  Cervical back: Normal range of motion and neck supple  No rigidity  No muscular tenderness  Right lower leg: No edema  Left lower leg: No edema  Lymphadenopathy:      Cervical: No cervical adenopathy  Skin:     General: Skin is warm and dry  Capillary Refill: Capillary refill takes less than 2 seconds  Coloration: Skin is not jaundiced  Findings: No bruising, erythema, lesion or rash  Neurological:      Mental Status: She is alert  Cranial Nerves: No cranial nerve deficit  Sensory: Sensory deficit present  Motor: Weakness present  Coordination: Coordination normal       Comments: Left facial droop  Patient with you can say shin left face and left arm and left leg     Psychiatric:         Mood and Affect: Mood normal          Behavior: Behavior normal          Thought Content:  Thought content normal          Judgment: Judgment normal

## 2022-09-02 ENCOUNTER — APPOINTMENT (INPATIENT)
Dept: NON INVASIVE DIAGNOSTICS | Facility: HOSPITAL | Age: 62
DRG: 054 | End: 2022-09-02
Payer: COMMERCIAL

## 2022-09-02 PROBLEM — I65.29 CAROTID ARTERY STENOSIS: Status: ACTIVE | Noted: 2022-09-02

## 2022-09-02 PROBLEM — I77.9 CAROTID ARTERIAL DISEASE (HCC): Status: ACTIVE | Noted: 2022-09-02

## 2022-09-02 LAB
ANION GAP SERPL CALCULATED.3IONS-SCNC: 9 MMOL/L (ref 4–13)
AORTIC ROOT: 3.2 CM
APICAL FOUR CHAMBER EJECTION FRACTION: 48 %
ASCENDING AORTA: 2.9 CM
ATRIAL RATE: 63 BPM
AV REGURGITATION PRESSURE HALF TIME: 720 MS
BUN SERPL-MCNC: 22 MG/DL (ref 5–25)
CALCIUM SERPL-MCNC: 9.5 MG/DL (ref 8.3–10.1)
CHLORIDE SERPL-SCNC: 104 MMOL/L (ref 96–108)
CHOLEST SERPL-MCNC: 219 MG/DL
CO2 SERPL-SCNC: 27 MMOL/L (ref 21–32)
CREAT SERPL-MCNC: 0.87 MG/DL (ref 0.6–1.3)
DOP CALC LVOT AREA: 3.14 CM2
DOP CALC LVOT DIAMETER: 2 CM
E WAVE DECELERATION TIME: 243 MS
ERYTHROCYTE [DISTWIDTH] IN BLOOD BY AUTOMATED COUNT: 13 % (ref 11.6–15.1)
EST. AVERAGE GLUCOSE BLD GHB EST-MCNC: 114 MG/DL
FRACTIONAL SHORTENING: 35 % (ref 28–44)
GFR SERPL CREATININE-BSD FRML MDRD: 70 ML/MIN/1.73SQ M
GLUCOSE SERPL-MCNC: 103 MG/DL (ref 65–140)
HBA1C MFR BLD: 5.6 %
HCT VFR BLD AUTO: 38.9 % (ref 34.8–46.1)
HDLC SERPL-MCNC: 50 MG/DL
HGB BLD-MCNC: 12.7 G/DL (ref 11.5–15.4)
INTERVENTRICULAR SEPTUM IN DIASTOLE (PARASTERNAL SHORT AXIS VIEW): 1.5 CM
INTERVENTRICULAR SEPTUM: 1.5 CM (ref 0.6–1.1)
LAAS-AP2: 19.9 CM2
LAAS-AP4: 20 CM2
LDLC SERPL CALC-MCNC: 129 MG/DL (ref 0–100)
LEFT ATRIUM SIZE: 4 CM
LEFT INTERNAL DIMENSION IN SYSTOLE: 2.6 CM (ref 2.1–4)
LEFT VENTRICULAR INTERNAL DIMENSION IN DIASTOLE: 4 CM (ref 3.5–6)
LEFT VENTRICULAR POSTERIOR WALL IN END DIASTOLE: 1.5 CM
LEFT VENTRICULAR STROKE VOLUME: 46 ML
LVSV (TEICH): 46 ML
MAGNESIUM SERPL-MCNC: 2.7 MG/DL (ref 1.6–2.6)
MCH RBC QN AUTO: 30 PG (ref 26.8–34.3)
MCHC RBC AUTO-ENTMCNC: 32.6 G/DL (ref 31.4–37.4)
MCV RBC AUTO: 92 FL (ref 82–98)
MV E'TISSUE VEL-SEP: 5 CM/S
MV PEAK A VEL: 0.83 M/S
MV PEAK E VEL: 56 CM/S
MV STENOSIS PRESSURE HALF TIME: 70 MS
MV VALVE AREA P 1/2 METHOD: 3.14 CM2
P AXIS: 101 DEGREES
PLATELET # BLD AUTO: 220 THOUSANDS/UL (ref 149–390)
PMV BLD AUTO: 9.4 FL (ref 8.9–12.7)
POTASSIUM SERPL-SCNC: 3.3 MMOL/L (ref 3.5–5.3)
PR INTERVAL: 148 MS
QRS AXIS: -24 DEGREES
QRSD INTERVAL: 90 MS
QT INTERVAL: 444 MS
QTC INTERVAL: 454 MS
RA PRESSURE ESTIMATED: 5 MMHG
RBC # BLD AUTO: 4.24 MILLION/UL (ref 3.81–5.12)
RIGHT ATRIUM AREA SYSTOLE A4C: 17.7 CM2
RIGHT VENTRICLE ID DIMENSION: 4.2 CM
RV PSP: 33 MMHG
SL CV AV DECELERATION TIME RETROGRADE: 2481 MS
SL CV AV PEAK GRADIENT RETROGRADE: 68 MMHG
SL CV LEFT ATRIUM LENGTH A2C: 5.4 CM
SL CV LV EF: 65
SL CV PED ECHO LEFT VENTRICLE DIASTOLIC VOLUME (MOD BIPLANE) 2D: 69 ML
SL CV PED ECHO LEFT VENTRICLE SYSTOLIC VOLUME (MOD BIPLANE) 2D: 24 ML
SODIUM SERPL-SCNC: 140 MMOL/L (ref 135–147)
T WAVE AXIS: 68 DEGREES
TR MAX PG: 28 MMHG
TR PEAK VELOCITY: 2.7 M/S
TRICUSPID VALVE PEAK REGURGITATION VELOCITY: 2.65 M/S
TRIGL SERPL-MCNC: 200 MG/DL
VENTRICULAR RATE: 63 BPM
WBC # BLD AUTO: 7.14 THOUSAND/UL (ref 4.31–10.16)

## 2022-09-02 PROCEDURE — 93306 TTE W/DOPPLER COMPLETE: CPT

## 2022-09-02 PROCEDURE — 83036 HEMOGLOBIN GLYCOSYLATED A1C: CPT | Performed by: INTERNAL MEDICINE

## 2022-09-02 PROCEDURE — 83735 ASSAY OF MAGNESIUM: CPT | Performed by: INTERNAL MEDICINE

## 2022-09-02 PROCEDURE — 93306 TTE W/DOPPLER COMPLETE: CPT | Performed by: INTERNAL MEDICINE

## 2022-09-02 PROCEDURE — 97162 PT EVAL MOD COMPLEX 30 MIN: CPT

## 2022-09-02 PROCEDURE — 97166 OT EVAL MOD COMPLEX 45 MIN: CPT

## 2022-09-02 PROCEDURE — 99232 SBSQ HOSP IP/OBS MODERATE 35: CPT | Performed by: HOSPITALIST

## 2022-09-02 PROCEDURE — 80048 BASIC METABOLIC PNL TOTAL CA: CPT | Performed by: INTERNAL MEDICINE

## 2022-09-02 PROCEDURE — 99232 SBSQ HOSP IP/OBS MODERATE 35: CPT | Performed by: PSYCHIATRY & NEUROLOGY

## 2022-09-02 PROCEDURE — 93010 ELECTROCARDIOGRAM REPORT: CPT | Performed by: INTERNAL MEDICINE

## 2022-09-02 PROCEDURE — 85027 COMPLETE CBC AUTOMATED: CPT | Performed by: INTERNAL MEDICINE

## 2022-09-02 PROCEDURE — 80061 LIPID PANEL: CPT | Performed by: INTERNAL MEDICINE

## 2022-09-02 RX ORDER — MAGNESIUM SULFATE HEPTAHYDRATE 40 MG/ML
2 INJECTION, SOLUTION INTRAVENOUS ONCE
Status: COMPLETED | OUTPATIENT
Start: 2022-09-02 | End: 2022-09-02

## 2022-09-02 RX ORDER — METOCLOPRAMIDE HYDROCHLORIDE 5 MG/ML
10 INJECTION INTRAMUSCULAR; INTRAVENOUS ONCE
Status: COMPLETED | OUTPATIENT
Start: 2022-09-02 | End: 2022-09-02

## 2022-09-02 RX ORDER — FUROSEMIDE 40 MG/1
40 TABLET ORAL DAILY
Status: DISCONTINUED | OUTPATIENT
Start: 2022-09-02 | End: 2022-09-04 | Stop reason: HOSPADM

## 2022-09-02 RX ORDER — METOPROLOL SUCCINATE 100 MG/1
100 TABLET, EXTENDED RELEASE ORAL 2 TIMES DAILY
Status: DISCONTINUED | OUTPATIENT
Start: 2022-09-02 | End: 2022-09-04 | Stop reason: HOSPADM

## 2022-09-02 RX ORDER — POTASSIUM CHLORIDE 20 MEQ/1
40 TABLET, EXTENDED RELEASE ORAL ONCE
Status: COMPLETED | OUTPATIENT
Start: 2022-09-02 | End: 2022-09-02

## 2022-09-02 RX ORDER — DIPHENHYDRAMINE HYDROCHLORIDE 50 MG/ML
25 INJECTION INTRAMUSCULAR; INTRAVENOUS ONCE
Status: COMPLETED | OUTPATIENT
Start: 2022-09-02 | End: 2022-09-02

## 2022-09-02 RX ORDER — ONDANSETRON 4 MG/1
4 TABLET, ORALLY DISINTEGRATING ORAL EVERY 6 HOURS PRN
Status: DISCONTINUED | OUTPATIENT
Start: 2022-09-02 | End: 2022-09-04 | Stop reason: HOSPADM

## 2022-09-02 RX ADMIN — ACETAMINOPHEN 650 MG: 325 TABLET ORAL at 09:57

## 2022-09-02 RX ADMIN — FLUTICASONE FUROATE AND VILANTEROL TRIFENATATE 1 PUFF: 200; 25 POWDER RESPIRATORY (INHALATION) at 09:36

## 2022-09-02 RX ADMIN — SUCRALFATE 1 G: 1 TABLET ORAL at 11:40

## 2022-09-02 RX ADMIN — MAGNESIUM SULFATE HEPTAHYDRATE 2 G: 40 INJECTION, SOLUTION INTRAVENOUS at 10:49

## 2022-09-02 RX ADMIN — MONTELUKAST 10 MG: 10 TABLET, FILM COATED ORAL at 21:42

## 2022-09-02 RX ADMIN — LORAZEPAM 0.5 MG: 0.5 TABLET ORAL at 05:52

## 2022-09-02 RX ADMIN — SUCRALFATE 1 G: 1 TABLET ORAL at 21:42

## 2022-09-02 RX ADMIN — METOCLOPRAMIDE HYDROCHLORIDE 10 MG: 5 INJECTION INTRAMUSCULAR; INTRAVENOUS at 10:47

## 2022-09-02 RX ADMIN — DIPHENHYDRAMINE HYDROCHLORIDE 25 MG: 50 INJECTION, SOLUTION INTRAMUSCULAR; INTRAVENOUS at 10:47

## 2022-09-02 RX ADMIN — FUROSEMIDE 40 MG: 40 TABLET ORAL at 10:47

## 2022-09-02 RX ADMIN — ONDANSETRON 4 MG: 4 TABLET, ORALLY DISINTEGRATING ORAL at 09:57

## 2022-09-02 RX ADMIN — APIXABAN 5 MG: 5 TABLET, FILM COATED ORAL at 17:46

## 2022-09-02 RX ADMIN — METOPROLOL SUCCINATE 100 MG: 100 TABLET, EXTENDED RELEASE ORAL at 21:42

## 2022-09-02 RX ADMIN — ACETAMINOPHEN 650 MG: 325 TABLET ORAL at 19:39

## 2022-09-02 RX ADMIN — SUCRALFATE 1 G: 1 TABLET ORAL at 17:46

## 2022-09-02 RX ADMIN — ASPIRIN 81 MG CHEWABLE TABLET 81 MG: 81 TABLET CHEWABLE at 09:33

## 2022-09-02 RX ADMIN — SUCRALFATE 1 G: 1 TABLET ORAL at 09:33

## 2022-09-02 RX ADMIN — PANTOPRAZOLE SODIUM 40 MG: 40 TABLET, DELAYED RELEASE ORAL at 05:52

## 2022-09-02 RX ADMIN — POTASSIUM CHLORIDE 40 MEQ: 1500 TABLET, EXTENDED RELEASE ORAL at 10:47

## 2022-09-02 RX ADMIN — LORAZEPAM 0.5 MG: 0.5 TABLET ORAL at 15:33

## 2022-09-02 RX ADMIN — METOPROLOL SUCCINATE 100 MG: 100 TABLET, EXTENDED RELEASE ORAL at 10:47

## 2022-09-02 RX ADMIN — APIXABAN 5 MG: 5 TABLET, FILM COATED ORAL at 09:33

## 2022-09-02 NOTE — OCCUPATIONAL THERAPY NOTE
Occupational Therapy Evaluation     Patient Name: Aneudy Bhagat  Today's Date: 9/2/2022  Problem List  Principal Problem:    Stroke-like symptoms  Active Problems:    Asthma due to seasonal allergies    Headache    Paroxysmal atrial fibrillation (HCC)    Acute cystitis without hematuria    Localized swelling of left lower extremity    Past Medical History  Past Medical History:   Diagnosis Date    A-fib ([facility] [address] ) 03/2020    Allergic     Anxiety     Arthritis     Asthma     Colon polyp     Depression     GERD (gastroesophageal reflux disease)     Heart murmur     Hives     Hyperlipidemia     Hypertension     Infertility, female     Migraine     Palpitations     Psychiatric disorder     Wears glasses      Past Surgical History  Past Surgical History:   Procedure Laterality Date    COLONOSCOPY      EGD      EXPLORATORY LAPAROTOMY      for infertility    HAND SURGERY      Hand excision of tendon cyst    MI LAP,APPENDECTOMY N/A 5/3/2022    Procedure: APPENDECTOMY LAPAROSCOPIC;  Surgeon: Kendra Keys MD;  [facility]: [facility];  Service: General    SUPERIOR [address] ARTERY STENT      WISDOM TOOTH EXTRACTION           09/02/22 1006   OT Last Visit   OT Visit Date 09/02/22   Note Type   Note type Evaluation   Additional Comments Pt seen with PT to increase safety, decrease fall risk, and maximize functional/occupational performance 2* medical complexity which is a regression from pt's functional baselie  Restrictions/Precautions   Weight Bearing Precautions Per Order No   Other Precautions Telemetry; Fall Risk;Pain   Pain Assessment   Pain Assessment Tool 0-10   Pain Score 5   Pain Location/Orientation Orientation: Left; Location: Arm;Location: Face; Location: Neck   Effect of Pain on Daily Activities Impacts ability to engage in valued occupations   Hospital Pain Intervention(s) Repositioned; Ambulation/increased activity; Emotional support   Home Living   Type of Home Apartment  (1st floor apartment w/ 0 XIOMARA)   Home Layout One level;Performs ADLs on one level; Able to live on main level with bedroom/bathroom; Access   Bathroom Shower/Tub Tub/shower unit   Bathroom Toilet Standard   Bathroom Equipment Other (Comment)  (Reports access to grab bars, but aren't installed yet)   [address] Other (Comment)  (No DME)   Additional Comments Pt reports living in 1st story apartment with 0 XIOMARA; no DME PTA   Prior Function   Level of [place] Independent with ADLs and functional mobility   Lives With Alone   Receives Help From Family   ADL Assistance Independent   IADLs Independent   Falls in the last 6 months 0   Vocational Full time employment   Comments (+) driving   Lifestyle   Autonomy PTA, pt was independent in ADLs/IADLs and functional mobility w/ no DME; (+) driving   Reciprocal Relationships Lives alone however reports having friends nearby that could assist with functional needs prn   Service to Others 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  7  [address] [facility] 6  Modified independent   Bed Mobility   Supine to Sit Unable to assess   Sit to Supine Unable to assess   Additional Comments Upon arrival, pt found sitting upright in recliner; @ end of session, pt left sitting EOB with all functional needs in reach   Transfers   Sit to Stand 6  Modified independent   Stand to Sit 6  Modified independent Additional Comments No DME   Functional Mobility   Functional Mobility 6  Modified independent   Additional Comments Pt completed household functional mobility distances @ Mod I level w/ no DME   Balance   Static Sitting Normal   Dynamic Sitting Good   Static Standing Fair +   Dynamic Standing Fair +   Ambulatory Fair +   Activity Tolerance   Activity Tolerance Patient tolerated treatment well   Medical Staff Made Aware DPT [place]   Nurse Made Aware RN cleared for OT eval   RUE Assessment   RUE Assessment WFL   LUE Assessment   LUE Assessment WFL  (grossly 4/5 MMT; however pt reporting that she feels LUE weaker compared to RUE)   Hand Function   Gross Motor Coordination Functional  (Pt reporting delayed LUE coordination however all coordination testing WFL)   Fine Motor Coordination Functional   Sensation   Sharp/Dull Partial deficits in the LLE   Additional Comments Pt reporting diminished sharp/dull sensations on LLE   Proprioception   Proprioception No apparent deficits   Vision-Basic Assessment   Current Vision Wears glasses all the time   Vision - Complex Assessment   Ocular Range of Motion [facility]   Additional Comments Pt reporting blurry peripheral vision in left eye   Perception   Inattention/Neglect Appears intact   Cognition   Overall Cognitive Status [facility]   Arousal/Participation Alert; Responsive;Arousable; Cooperative   Attention Within functional limits   Orientation Level Oriented X4   Memory Within functional limits   Following Commands Follows all commands and directions without difficulty   Comments Pt very pleasant and cooperative; demonstrates good safety awareness/insight; expresses no concerns regarding ADL/IADL/functional mobility tasks @ this time   Assessment   Prognosis Fair   Assessment Pt is a pleasant and cooperative 58 yo female who presented to [facility] with left facial numbness, left arm numbness, and weakness  Pt dx w/ stroke-like symptoms  CT and MRI (-) acute intracranial abnormality   Pt  has a past medical history of A-fib ([facility] [address] ) (03/2020), Allergic, Anxiety, Arthritis, Asthma, Colon polyp, Depression, GERD (gastroesophageal reflux disease), Heart murmur, Hives, Hyperlipidemia, Hypertension, Infertility, female, Migraine, Palpitations, Psychiatric disorder, and Wears glasses  Pt with active OT orders and OT consulted to assess pt's functional status and occupational performance to determine safe d/c needs  Pt seen for co-eval with PT to increase safety, decrease fall risk, and maximize functional/occupational performance 2* medical complexity which is a regression from pt's functional baseline  Pt lives alone in 1st floor apartment with 0 XIOMARA  PTA, pt was independent in ADLs/IADLs and functional mobility w/ no DME  (+) driving  Discussed role and scope of OT in which pt was receptive  At this time, pt is not demonstrating any significant occupational deficits and is functioning at a level of Mod I for functional transfers and functional mobility w/ no DME, UB ADLs/LB ADLs @ independent level  From an OT standpoint, recommend discharge to home with increased social support once medically stable  Pt was receptive regarding education on returning home safely with energy conservation techniques and demonstrated good carryover during occupational/functional performance  At this time, pt demonstrates good insight/safety awareness and does not express any concerns regarding performing ADL/IADL/functional mobility tasks  No further skilled acute care OT services are needed at this time  The patient's raw score on the AM-PAC Daily Activity inpatient short form is 24, standardized score is 57 54, greater than 39 4  Patients at this level are likely to benefit from discharge to home  Please refer to the recommendation of the Occupational Therapist for safe discharge planning   Recommend continued engagement in ADL/IADL/functional mobility tasks with nursing and restorative therapy staff as appropriate to promote the highest level of independence prior to discharge  OT is discharging pt from caseload at this time, please reconsult if needed     Goals   Patient Goals To go home   Plan   OT Frequency Eval only   Recommendation   OT Discharge Recommendation No rehabilitation needs   AM-PAC Daily Activity Inpatient   Lower Body Dressing 4   Bathing 4   Toileting 4   Upper Body Dressing 4   Grooming 4   Eating 4   Daily Activity Raw Score 24   Daily Activity Standardized Score (Calc for Raw Score >=11) 57 54   AM-PAC Applied Cognition Inpatient   Following a Speech/Presentation 4   Understanding Ordinary Conversation 4   Taking Medications 4   Remembering Where Things Are Placed or Put Away 4   Remembering List of 4-5 Errands 4   Taking Care of Complicated Tasks 4   Applied Cognition Raw Score 24   Applied Cognition Standardized Score 62 21       Maxcine Reasons, MS, OTR/L

## 2022-09-02 NOTE — ASSESSMENT & PLAN NOTE
· Patient is on metoprolol succinate 100 mg b i d    · On Cardizem 120 mg as needed in case of palpitations  · On Eliquis for anticoagulation  · BB was held for permissive HTN, before getting it again she was going into afib with -130s, better after taking home toprol xl

## 2022-09-02 NOTE — ASSESSMENT & PLAN NOTE
Pt's spouse advised. Verbalized understanding. He will call Dr. Miller Poag office. · For history of lower extremity swelling patient is on Lasix 40 mg daily  · She had an unremarkable echo 2020  · Repeat echo as part of a stroke pathway  · Hold Lasix due to permissive hypertension, clinically patient euvolemic    No lower extremity swelling  · Restart lasix

## 2022-09-02 NOTE — UTILIZATION REVIEW
Initial Clinical Review    Admission: Date/Time/Statement:   Admission Orders (From admission, onward)     Ordered        09/01/22 1143  [address]  Once                      Orders Placed This Encounter   Procedures    INPATIENT ADMISSION     Standing Status:   Standing     Number of Occurrences:   1     Order Specific Question:   Level of Care     Answer:   Med Surg [16]     Order Specific Question:   Estimated length of stay     Answer:   More than 2 Midnights     Order Specific Question:   Certification     Answer:   I certify that inpatient services are medically necessary for this patient for a duration of greater than two midnights  See H&P and MD Progress Notes for additional information about the patient's course of treatment  ED Arrival Information     Expected   -    Arrival   9/1/2022 10:29    Acuity   Emergent            Means of arrival   Ambulance    Escorted by   [id number]ist    Admission type   Emergency            Arrival complaint   Stroke Alert           Chief Complaint   Patient presents with    Numbness    STROKE Alert       Initial Presentation: 57 y o  female presents to ed from home via ems for evaluation and treatment of stroke like symptoms  Patient work with difficulty speaking , decreased sensation of left face, arm and leg  She went to urgent care NIH =1 for decreased sensation  Stroke alert  PMHX: AFIB ON ELIQUIS, MIGRAINES    Clinical assessment significant for hypertension, tachypnea, pain 5-7/10  Neurologic physical assessment shows decreased sensation on the left  Patient reports headache  Imaging shows 80% stenosis of L ICA  Initially treated with iv benadryl, iv reglan,iv mag so4 and po ativan  Not a tPA candidate as symptoms non disabling  Admit to inpatient med surg for stroke like symptoms  Neurology consult: etiology may be atypical migraine if no ischemia on imaging  Migraine cocktail given in ed    Continue stroke pathway  Date: 9-2-22 Day 2: inpatient med surg  Patient reports to speech therapist amber left facial numbness and slurred speech are almost back to normal  Regular diet  MRI completed without acute finding  Follow up echo with EF 65%   Obtain PT/OT/ST evaluations completed and no needs identified  Continue neuro checks and telemetry  Headache now 3/10  5/10  Received iv benadryl, iv mag, iv reglan at 1047   Prn zofran and po ativan given x 1           ED Triage Vitals   09/01/22 1133 09/01/22 1034 09/01/22 1034 09/01/22 1034 09/01/22 1034   98 °F (36 7 °C) 62 20 (!) 209/105 94 %      Oral Monitor         No Pain          09/02/22 92 5 kg (204 lb)     Additional Vital Signs:     Date/Time Temp Pulse Resp BP MAP (mmHg) SpO2 O2 Device   09/02/22 09:38:11 -- 78 -- 150/84 106 95 % --   09/02/22 08:04:21 98 °F (36 7 °C) 70 16 144/84 104 95 % --   09/02/22 08:03:55 98 °F (36 7 °C) 73 18 144/84 104 97 % --   09/02/22 0739 -- 67 -- 144/65 -- -- --   09/01/22 2230 -- 67 -- 144/65 91 93 % --   09/01/22 21:12:10 -- 71 -- 140/65 90 93 % --   09/01/22 18:16:39 -- 65 -- 146/66 93 92 % --   09/01/22 17:32:32 -- 62 -- 132/67 89 93 % --   09/01/22 14:05:48 98 3 °F (36 8 °C) 58 18 120/67 85 92 % --   09/01/22 1400 -- -- -- -- -- -- None (Room air)   09/01/22 1330 -- 60 18 140/75 95 94 % None (Room air)   09/01/22 1315 -- 60 31 Abnormal  132/77 98 92 % None (Room air)   09/01/22 1300 -- 68 24 Abnormal  141/112 Abnormal  123 92 % None (Room air)   09/01/22 1230 -- 62 21 153/67 96 92 % None (Room air)   09/01/22 1227 -- -- -- -- -- -- None (Room air)   09/01/22 1215 -- 64 24 Abnormal  150/65 94 92 % None (Room air)   09/01/22 1200 -- 60 16 141/63 90 91 % None (Room air)   09/01/22 1145 -- 61 16 160/66 95 94 % None (Room air)   09/01/22 1133 98 °F (36 7 °C) -- -- -- -- -- --   09/01/22 1130 -- 66 27 Abnormal  208/94 Abnormal  -- 97 % --   09/01/22 1115 -- 66 32 Abnormal  200/84 Abnormal  121 94 % None (Room air)   09/01/22 11:00:30 -- 67 16 197/82 Abnormal  -- 96 % None (Room air)   09/01/22 10:51:49 -- -- -- 190/85 Abnormal  -- -- --   09/01/22 1049 -- 62 16 -- -- 96 % None (Room air)   09/01/22 10:41:29 -- 62 -- 169/72 -- 97 % --   09/01/22 10:34:43 -- 62 20 209/105 Abnormal  -- 94 % None (Room air)             Pertinent Labs/Diagnostic Test Results:       9-2-22 Echo    Left Ventricle: Left ventricular cavity size is normal  Wall thickness is mildly increased  There is mild concentric hypertrophy  The left ventricular ejection fraction is 65%  Systolic function is normal  Wall motion is normal  Diastolic function is mildly abnormal, consistent with grade I (abnormal) relaxation    Right Ventricle: Right ventricular cavity size is normal  Systolic function is normal     Right Atrium: The atrium is mildly dilated    Aortic Valve: There is mild regurgitation  There is aortic sclerosis    Mitral Valve: There is mild annular calcification    Tricuspid Valve: The right ventricular systolic pressure is normal  The estimated right ventricular systolic pressure is 97 61 mmHg  Collection Time Result Time Vent R Atrial R DC Int  QRSD Int  QT Int  QTC Int  P Axis QRS Axis T Wave Ax    09/01/22 10:51:13 09/02/22 10:31:35 63 63 148 90 444 454 101 -24 68                       Normal sinus rhythm   Left ventricular hypertrophy with repolarization abnormality   Abnormal ECG   When compared with ECG of 04-MAY-2022 00:32,   T wave inversion no longer evident in Inferior leads   Nonspecific T wave abnormality no longer evident in Anterior leads   T wave inversion now evident in Lateral leads                   MRI brain wo contrast   Final  (09/02 0003)      No evidence of acute infarct, intracranial hemorrhage or mass  CT stroke alert brain   Final  (09/01 1112)      No acute intracranial CT abnormality  No significant interval change         CTA stroke alert (head/neck)   Final  (09/01 1111)      1    No intracranial large vessel occlusion or critical stenosis  No aneurysm  2   Progression of atherosclerotic disease in the left cervical carotid artery with a new linear intraluminal defect at the origin/proximal ICA suggesting an ulcerated plaque resulting in about 80% stenosis of proximal ICA  3   Stable atherosclerotic change of right cervical carotid artery without hemodynamically significant stenosis              Results from last 7 days   Lab Units 09/02/22  0501 09/01/22  1704 09/01/22  1040   WBC Thousand/uL 7 14  --  8 09   HEMOGLOBIN g/dL 12 7  --  13 5   HEMATOCRIT % 38 9  --  41 8   PLATELETS Thousands/uL 220 258 241         Results from last 7 days   Lab Units 09/02/22  0501 09/01/22  1040   SODIUM mmol/L 140 138   POTASSIUM mmol/L 3 3* 3 6   CHLORIDE mmol/L 104 104   CO2 mmol/L 27 30   ANION GAP mmol/L 9 4   BUN mg/dL 22 20   CREATININE mg/dL 0 87 0 91   EGFR ml/min/1 73sq m 70 66   CALCIUM mg/dL 9 5 9 5   MAGNESIUM mg/dL 2 7*  --          Results from last 7 days   Lab Units 09/01/22  1033   POC GLUCOSE mg/dl 122     Results from last 7 days   Lab Units 09/02/22  0501 09/01/22  1040   GLUCOSE RANDOM mg/dL 103 138         Results from last 7 days   Lab Units 09/02/22  0501   HEMOGLOBIN A1C % 5 6   EAG mg/dl 114       Results from last 7 days   Lab Units 09/01/22  1704 09/01/22  1254 09/01/22  1040   HS TNI 0HR ng/L  --   --  8   HS TNI 2HR ng/L 9  --   --    HSTNI D2 ng/L 1  --   --    HS TNI 4HR ng/L  --  9  --    HSTNI D4 ng/L  --  1  --          Results from last 7 days   Lab Units 09/01/22  1040   PROTIME seconds 14 6*   INR  1 12   PTT seconds 34       Results from last 7 days   Lab Units 09/01/22  1652   CLARITY UA  Clear   COLOR UA  Yellow   SPEC GRAV UA  1 047*   PH UA  6 0   GLUCOSE UA mg/dl Negative   KETONES UA mg/dl Negative   BLOOD UA  Negative   PROTEIN UA mg/dl Negative   NITRITE UA  Negative   BILIRUBIN UA  Negative   UROBILINOGEN UA (BE) mg/dl <2 0   LEUKOCYTES UA  Negative   WBC UA /hpf 2-4* RBC UA /hpf 1-2   BACTERIA UA /hpf None Seen   EPITHELIAL CELLS WET PREP /hpf Occasional       ED Treatment:   Medication Administration from 09/01/2022 1013 to 09/01/2022 1348       Date/Time Order Dose Route Action     09/01/2022 1211 LORazepam (ATIVAN) tablet 1 mg 1 mg Oral Given     09/01/2022 1211 diphenhydrAMINE (BENADRYL) injection 25 mg 25 mg Intravenous Given     09/01/2022 1212 metoclopramide (REGLAN) injection 10 mg 10 mg Intravenous Given     09/01/2022 1309 magnesium sulfate 2 g/50 mL IVPB (premix) 2 g 2 g Intravenous New Bag        Past Medical History:   Diagnosis Date    [facility]    Allergic     Anxiety     Arthritis     Asthma     Colon polyp     Depression     GERD (gastroesophageal reflux disease)     Heart murmur     Hives     Hyperlipidemia     Hypertension     Infertility, female     Migraine     Palpitations     Psychiatric disorder     Wears glasses      Present on Admission:   Acute cystitis without hematuria   Asthma due to seasonal allergies   Paroxysmal atrial fibrillation (HCC)      Admitting Diagnosis: Stroke-like symptoms [R29 90]     Age/Sex: 57 y o  female    Scheduled Medications:  apixaban, 5 mg, Oral, BID  aspirin, 81 mg, Oral, Daily  diphenhydrAMINE, 25 mg, Intravenous, Once  fluticasone-vilanterol, 1 puff, Inhalation, Daily  furosemide, 40 mg, Oral, Daily  magnesium sulfate, 2 g, Intravenous, Once  metoclopramide, 10 mg, Intravenous, Once  metoprolol succinate, 100 mg, Oral, BID  montelukast, 10 mg, Oral, HS  pantoprazole, 40 mg, Oral, Early Morning  potassium chloride, 40 mEq, Oral, Once  sucralfate, 1 g, Oral, 4x Daily      Continuous IV Infusions:     PRN Meds:  acetaminophen, 650 mg, Oral, Q8H PRN  labetalol, 10 mg, Intravenous, Q6H PRN  LORazepam, 0 5 mg, Oral, Q8H PRN  ondansetron, 4 mg, Oral, Q6H PRN  traMADol, 50 mg, Oral, TID PRN        IP CONSULT TO NEUROLOGY  IP CONSULT TO CASE MANAGEMENT  IP CONSULT TO NUTRITION SERVICES    Network Utilization Review Department  ATTENTION: Please call with any questions or concerns to [phone] and carefully listen to the prompts so that you are directed to the right person  All voicemails are confidential   Fany Bee all requests for admission clinical reviews, approved or denied determinations and any other requests to dedicated fax number below belonging to the campus where the patient is receiving treatment   List of dedicated fax numbers for the Facilities:  [address] DENIALS (Administrative/Medical Necessity) [phone]   [address] (Maternity/NICU/Pediatrics) [phone]   [address]  [address] [id number] [place] [address] [address] [facility] [place] [address] [phone]

## 2022-09-02 NOTE — CASE MANAGEMENT
Case Management Assessment & Discharge Planning Note    Patient name [address] 727/PPHP 970-16 MRN [id number]  : 1960 Date 2022       Current Admission Date: 2022  Current Admission Diagnosis:Stroke-like symptoms   Patient Active Problem List    Diagnosis Date Noted    Stroke-like symptoms 2022    Localized swelling of left lower extremity 2022    Acute CVA (cerebrovascular accident) ([facility] ) 2022    Recurrent UTI 2022    Pain of upper abdomen 2022    Left upper quadrant abdominal pain 2022    S/P appendectomy 2022    Appendix disease 2022    Urinary retention 2022    Peripheral vascular disease ([facility] ) 2022    Hematoma 11/15/2021    Continuous opioid dependence ([facility] ) 11/15/2021    Acute cystitis without hematuria 2021    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    Hypoxia 2021    Chronic bronchitis, unspecified chronic bronchitis type ([facility] ) 2021    Left arm pain 2021    COPD (chronic obstructive pulmonary disease) ([facility] ) 2021    Diverticulitis 2021    Left upper quadrant pain 2021    Well adult exam 2021    Pyelonephritis 2021    Moderate persistent asthma without complication     Vitamin B12 deficiency 2021    Unspecified diastolic (congestive) heart failure ([facility] ) 2021    Shortness of breath 2021    Hypertensive heart disease with congestive heart failure ([facility] ) 2020    CAMILLE (obstructive sleep apnea)     Acute pyelonephritis 2020    Lumbar radiculopathy 10/01/2020    Chronic fatigue 10/01/2020    Wheezing 2020    Injury of toe on right foot 2020    Patellar tendinitis of left knee 2020    Hair loss 2020    [place] (dyspnea on exertion) 2020    Vaginal candidiasis 2020    Paroxysmal atrial fibrillation (HCC) 2020    Low TSH level 2020    Other chest pain 01/27/2020    Vasomotor rhinitis 01/25/2020    Allergic conjunctivitis of both eyes 01/22/2020    Intrinsic atopic dermatitis 01/22/2020    Angio-edema 01/22/2020    Gastroesophageal reflux disease without esophagitis 08/12/2019    Tinea corporis 07/15/2019    Acute gastric ulcer without hemorrhage or perforation 06/12/2019    Palpitations and chest pain   04/02/2019    Intertrigo 04/02/2019    Allergic reaction 12/03/2018    Insomnia 11/16/2018    Snoring 11/16/2018    Cervicalgia 11/16/2018    Headache 11/16/2018    AMD (age-related macular degeneration), bilateral 11/16/2018    Anxiety 10/19/2018    Angina pectoris ([address] ) 11/15/2017    Female pelvic pain 09/25/2017    Arthritis 08/01/2017    Venous insufficiency 08/01/2017    Tick bite 07/14/2017    Acute upper respiratory infection 02/24/2017    Attention disturbance 01/26/2017    Familial hyperlipidemia 12/02/2016    Allergic rhinitis 10/14/2016    Anxiety and depression 10/14/2016    Asthma due to seasonal allergies 10/14/2016    Benign essential hypertension 10/14/2016    Interstitial cystitis 10/14/2016    Chronic low back pain 10/14/2016    Elevated antinuclear antibody (SON) level 10/14/2016    Elevated blood sugar 10/14/2016    Hyperlipidemia 10/14/2016    Migraines 10/14/2016    Essential hypertension 08/13/2015    Lipid disorder 08/13/2015    Tobacco abuse 08/13/2015    Obesity (BMI 30-39 9) 03/24/2011      LOS (days): 1  Geometric Mean LOS (GMLOS) (days): 2 20  Days to GMLOS:1     OBJECTIVE:    Risk of Unplanned Readmission Score: 22 58         Current admission status: Inpatient       Preferred Pharmacy:   [address] [facility], [address] [place] [address] [place] [address]  Phone: [phone] Fax: [phone]    [address]  [facility] [address] [address]  [address]  Phone: [phone] Fax: [phone]    Primary Care Provider: Milena Strange DO    Primary Insurance: Desmond Hemphill  Secondary Insurance:     ASSESSMENT:  Luis E Smith Proxies    There are no active Health Care Proxies on file  Readmission Root Cause  30 Day Readmission: No    Patient Information  Admitted from[de-identified] Home  Mental Status: Alert  During Assessment patient was accompanied by: Not accompanied during assessment  Assessment information provided by[de-identified] Patient  Primary Caregiver: Self  Support Systems: [place] of Residence: [address] [place] [address] do you live in?: [address] [facility] entry access options   Select all that apply : No steps to enter home  Type of Current Residence: Apartment  Floor Level: 1  Upon entering residence, is there a bedroom on the main floor (no further steps)?: Yes  Upon entering residence, is there a bathroom on the main floor (no further steps)?: Yes  In the last 12 months, was there a time when you were not able to pay the mortgage or rent on time?: No  In the last 12 months, how many places have you lived?: 1  In the last 12 months, was there a time when you did not have a steady place to sleep or slept in a shelter (including now)?: No  Homeless/housing insecurity resource given?: N/A  Living Arrangements: Lives Alone  Is patient a ?: No    Activities of Daily Living Prior to Admission  Functional Status: Independent  Completes ADLs independently?: Yes  Ambulates independently?: Yes  Does patient use assisted devices?: No  Does patient currently own DME?: No  Does patient have a history of Outpatient Therapy (PT/OT)?: No  Does the patient have a history of Short-Term Rehab?: No  Does patient have a history of HHC?: No  [facility]: No         Patient Information Continued  Income Source: Unemployed  Does patient have prescription coverage?: Yes  Within the past 12 months, you worried that your food would run out before you got the money to buy more : Never true  Within the past 12 months, the food you bought just didn't last and you didn't have money to get more : Never true  Food insecurity resource given?: N/A  Does patient receive dialysis treatments?: No  Does patient have a history of substance abuse?: No  Does patient have a history of Mental Health Diagnosis?: No         Means of Transportation  Means of Transport to Appts[de-identified] Drives Self  In the past 12 months, has lack of transportation kept you from medical appointments or from getting medications?: No  In the past 12 months, has lack of transportation kept you from meetings, work, or from getting things needed for daily living?: No  Was application for public transport provided?: N/A      DISCHARGE DETAILS:    Discharge planning discussed with[de-identified] bedside with pt  Freedom of Choice: No  Comments - Freedom of Choice: No reccomendations at this time  CM contacted family/caregiver?: No- see comments (pt has no family   Pt has been in contact with friend)      Contacts  Patient Contacts: Yogesh Andino  Relationship to Patient[de-identified] Friend  Contact Method: Phone  Phone Number: [phone]  Reason/Outcome: Continuity of Care, Emergency [address]         Is the patient interested in [place] [address] at discharge?: No    DME Referral Provided  Referral made for DME?: No     Additional Comments: Pt would like contact Yogesh Query be updated on pt health and reccomendations

## 2022-09-02 NOTE — PROGRESS NOTES
Progress Note - Neurology   Preethi Barboza 57 y o  female [id number]  Unit/Bed#: [facility] 727/[facility] 727-01    Assessment:    * Stroke-like symptoms  Assessment & Plan  57 y o with chronic interstitial cystitis, migraines, afib on Eliquis, HTN, HLD, anxiety/depression, and GERD who presents as a pre hospital stroke alert  Patient went to bed around midnight with mild left facial numbness, and woke up at 0600 this morning with left facial, truncal, arm and leg numbness, as well as left leg weakness  She felt that her speech was "off" and she had a 5/10 headache with nausea  Patient took her Eliquis, Tylenol and Zofran this morning  She then went to her PCP appointment with progressive symptoms, who then alerted EMS  Patient [facility] by [facility] mobile stroke unit and brought to [facility]  Upon arrival to ED,   NIHSS 1 for sensory deficits  CTH negative for acute intracranial abnormality  CTA head/neck negative for LVO or significant stenosis  She does have stable atherosclerosis of R cervical carotid artery and 80% stenosis of proximal L ICA  While examining the patient in ED, she reported slight improvement in sensory deficits  She reports she had a similar headache on Monday 08/29 with left jaw numbness, however did not seek medical care at that time  Etiology of symptoms could be related to atypical migraine  MRI brain without acute intracranial abnormalities  Today, patient reports continued dull pain behind left eye, left eye blurry vision, and numbness around her left eye but denies any numbness in the rest of her face or any extremities  On exam, she exhibits decreased sensation to pinprick in left UE/LE compared to right  Will give another dose of migraine cocktail x 1  Plan:  - Stroke pathway  - Patient reports daily baby aspirin use   Continue with home Aspirin 81 mg   - Reports myalgias to statins, okay to continue with home medication of Repatha   - Echo pending official report  - Goal normotension  - Euglycemic, normothermic goal  - Recommend outpatient follow up with vascular  - Continue telemetry  - PT/OT/ST  - Secondary risk factor modification    - Stroke education  - Frequent neuro checks  Continue to monitor and notify neurology with any changes   - STAT CT head for any acute change in neuro exam  - Migraine cocktail x 1: Reglan 10 mg IV, magnesium 2 g IV, Benadryl 25 mg IV  - Medical management and supportive care per primary team  Correction of any metabolic or infectious disturbances  Results:  - CT head: No acute intracranial CT abnormality  No significant interval change  - CTA head and neck:  1  No intracranial large vessel occlusion or critical stenosis  No aneurysm  2   Progression of atherosclerotic disease in the left cervical carotid artery with a new linear intraluminal defect at the origin/proximal ICA suggesting an ulcerated plaque resulting in about 80% stenosis of proximal ICA  3   Stable atherosclerotic change of right cervical carotid artery without hemodynamically significant stenosis  - MRI brain: No evidence of acute infarct, intracranial hemorrhage or mass  - A1C 5 6  - Lipid panel: cholesterol 219, triglycerides 200, HDL 50,     Headache  Assessment & Plan  - Reports daily mild headaches - uses 1 tab Fioricet 1-2 x per week and daily tylenol use  She also has a history of frontal migraines that progress into left ocular pain with associated left sided jaw numbness, nausea, photophobia and phonophbia   - s/p 25 mg IV Benadryl, 10 mg IV Reglan, and 2 g IV Magnesium; will give another dose of migraine cocktail today x 1   - Monitor neuro exam; notify with any changes       Alex Cates will need follow up in in 6 weeks with headache attending/AP  She will not require outpatient neurological testing  Case and treatment plan reviewed with attending neurologist, Dr Escobar Carrier   Please see attending attestation for any further recommendations  Subjective:   Patient sitting on side of bed  She reports that she continues to have dull pain behind her left eye but it is improved from yesterday, rating it now a 5/10 compared to 9/10 yesterday  She does continue to have some blurred vision in her left eye but again notes this is improved from yesterday  She reports some numbness around her left eye but denies any numbness in the rest of her face or in any of her extremities  She does feel the medications for headache helped yesterday  Past Medical History:   Diagnosis Date    A-fib [facility]) 03/2020    Allergic     Anxiety     Arthritis     Asthma     Colon polyp     Depression     GERD (gastroesophageal reflux disease)     Heart murmur     Hives     Hyperlipidemia     Hypertension     Infertility, female     Migraine     Palpitations     Psychiatric disorder     Wears glasses      Past Surgical History:   Procedure Laterality Date    COLONOSCOPY      EGD      EXPLORATORY LAPAROTOMY      for infertility    HAND SURGERY      Hand excision of tendon cyst    TN LAP,APPENDECTOMY N/A 5/3/2022    Procedure: APPENDECTOMY LAPAROSCOPIC;  Surgeon:  Benton Horvath MD;  [facility];  Service: General    SUPERIOR MESTENTERIC ARTERY STENT      WISDOM TOOTH EXTRACTION       Family History   Problem Relation Age of Onset    Lung cancer Mother 61    Brain cancer Mother 61    Diabetes Father     Depression Father     Other Father         septic    Allergies Sister     Hashimoto's thyroiditis Sister     Abdominal aortic aneurysm Sister     Diabetes Sister     No Known Problems Sister     No Known Problems Maternal Grandmother     No Known Problems Maternal Grandfather     No Known Problems Paternal Grandmother     No Known Problems Paternal Grandfather     Hodgkin's lymphoma Brother     No Known Problems Brother     No Known Problems Maternal Aunt     Diabetes Other      Social History     Socioeconomic History    Marital status:      Spouse name: None    Number of children: 0    Years of education: None    Highest education level: None   Occupational History    Occupation: unemployed   Tobacco Use    Smoking status: Former Smoker     Packs/day: 0 50     Years: 10 00     Pack years: 5 00     Quit date: 2019     Years since quitting: 3 6    Smokeless tobacco: Never Used   Vaping Use    Vaping Use: Never used   Substance and Sexual Activity    Alcohol use: Never    Drug use: No    Sexual activity: Not Currently   Other Topics Concern    None   Social History Narrative    Who lives in your home: Alone     What type of home do you live in: [address]     Age of your home: 1952 built     How long have you been living there: 5 yrs     Type of heat: forced hot air     Type of fuel: electric     What type of jamin is in your bedroom: carpet jamin     Do you have the following in or near your home:    Air products: Humidifier in the bedroom/kitchen     Pests: none     Pets: none     Are pets allowed in bedroom: N/A     Open fields, wooded areas nearby: No     Basement: [id number] mold     Exposure to second hand smoke: No    Central air     Habits:    Caffeine: Hot tea 1 cup daily- ice tea 1 cup in the afternoon    Chocolate: Occasionally      Social Determinants of Health     Financial Resource Strain: Not on file   Food Insecurity: Not on file   Transportation Needs: Not on file   Physical Activity: Not on file   Stress: Not on file   Social Connections: Not on file   Intimate Partner Violence: Not on file   Housing Stability: Not on file         Medications:   All current active meds have been reviewed and current meds:  Scheduled Meds:  Current Facility-Administered Medications   Medication Dose Route Frequency Provider Last Rate    acetaminophen  650 mg Oral Q8H PRN Edgar Burdick MD      apixaban  5 mg Oral BID Edgar Burdick MD      aspirin  81 mg Oral Daily Ethel Tevin Glynn MD      fluticasone-vilanterol  1 puff Inhalation Daily Lamona Angelucci, MD      furosemide  40 mg Oral Daily Jerry Maldonado MD      labetalol  10 mg Intravenous Q6H PRN Lamona Angelucci, MD      LORazepam  0 5 mg Oral Q8H PRN Lamona Angelucci, MD      metoprolol succinate  100 mg Oral BID Jerry Maldonado MD      montelukast  10 mg Oral HS Ethel Cedeno MD      ondansetron  4 mg Oral Q6H PRN Jerry Maldonado MD      pantoprazole  40 mg Oral Early Morning Lamona Angelucci, MD      sucralfate  1 g Oral 4x Daily Lamona Angelucci, MD      traMADol  50 mg Oral TID PRN Lamona Angelucci, MD       Continuous Infusions:   PRN Meds:   acetaminophen    labetalol    LORazepam    ondansetron    traMADol       ROS:   Review of Systems   Constitutional: Negative for fever  HENT: Negative for trouble swallowing  Eyes: Positive for photophobia and visual disturbance  Respiratory: Negative for shortness of breath  Cardiovascular: Negative for chest pain  Gastrointestinal: Negative for abdominal pain, nausea and vomiting  Musculoskeletal: Positive for back pain  Skin: Negative for rash  Neurological: Positive for numbness and headaches  Negative for dizziness, tremors, seizures, syncope, facial asymmetry, speech difficulty, weakness and light-headedness  Psychiatric/Behavioral: Negative for confusion  All other systems reviewed and are negative  Vitals:   /87   Pulse 81   Temp 98 °F (36 7 °C)   Resp 16   Ht 5' 4" (1 626 m)   Wt 92 5 kg (204 lb)   LMP  (LMP Unknown)   SpO2 94%   BMI 35 02 kg/m²       Physical Exam:   Physical Exam  Vitals and nursing note reviewed  Constitutional:       General: She is not in acute distress  Appearance: She is not ill-appearing or diaphoretic  HENT:      Head: Normocephalic  Mouth/Throat:      Mouth: Mucous membranes are moist       Pharynx: Oropharynx is clear     Eyes:      General: No scleral icterus  Right eye: No discharge  Left eye: No discharge  Extraocular Movements: Extraocular movements intact and EOM normal       Conjunctiva/sclera: Conjunctivae normal    Cardiovascular:      Rate and Rhythm: Normal rate  Pulmonary:      Effort: Pulmonary effort is normal  No respiratory distress  Musculoskeletal:         General: Normal range of motion  Cervical back: Normal range of motion  Skin:     General: Skin is warm and dry  Coloration: Skin is not jaundiced or pale  Neurological:      Mental Status: She is alert and oriented to person, place, and time  Coordination: Finger-Nose-Finger Test normal    Psychiatric:         Mood and Affect: Mood normal        Neurologic Exam     Mental Status   Oriented to person, place, and time  Level of consciousness: alert  Able to follow commands  No dysarthria noted  Cranial Nerves     CN II   Right visual field deficit: none  Left visual field deficit: none     CN III, IV, VI   Extraocular motions are normal      CN V   Facial sensation intact  CN VII   Facial expression full, symmetric  CN VIII   Hearing: intact    CN IX, X   Palate: symmetric    CN XI   CN XI normal      CN XII   CN XII normal      Motor Exam   Muscle bulk: normal  5/5 strength throughout bilateral UE/LE     Sensory Exam   Light touch normal    Decreased sensation to pinprick in left UE (80% compared to right UE)/LE (90% compared to right LE)     Gait, Coordination, and Reflexes     Coordination   Finger to nose coordination: normal    Tremor   Resting tremor: absent  Intention tremor: absent    Reflexes   Right plantar: normal  Left plantar: normal        Labs: I have personally reviewed pertinent reports     Recent Results (from the past 24 hour(s))   HS Troponin I 4hr    Collection Time: 09/01/22 12:54 PM   Result Value Ref Range    hs TnI 4hr 9 "Refer to ACS Flowchart"- see link ng/L    Delta 4hr hsTnI 1 <20 ng/L   Urinalysis    Collection Time: 09/01/22  4:52 PM   Result Value Ref Range    Color, UA Yellow     Clarity, UA Clear     Specific Gravity, UA 1 047 (H) 1 003 - 1 030    pH, UA 6 0 4 5, 5 0, 5 5, 6 0, 6 5, 7 0, 7 5, 8 0    Leukocytes, UA Negative Negative    Nitrite, UA Negative Negative    Protein, UA Negative Negative mg/dl    Glucose, UA Negative Negative mg/dl    Ketones, UA Negative Negative mg/dl    Urobilinogen, UA <2 0 <2 0 mg/dl mg/dl    Bilirubin, UA Negative Negative    Occult Blood, UA Negative Negative    RBC, UA 1-2 None Seen, 1-2 /hpf    WBC, UA 2-4 (A) None Seen, 1-2 /hpf    Epithelial Cells Occasional None Seen, Occasional /hpf    Bacteria, UA None Seen None Seen, Occasional /hpf   HS Troponin I 2hr    Collection Time: 09/01/22  5:04 PM   Result Value Ref Range    hs TnI 2hr 9 "Refer to ACS Flowchart"- see link ng/L    Delta 2hr hsTnI 1 <20 ng/L   Platelet count    Collection Time: 09/01/22  5:04 PM   Result Value Ref Range    Platelets 959 352 - 742 Thousands/uL    MPV 9 2 8 9 - 12 7 fL   Lipid Panel with Direct LDL reflex    Collection Time: 09/02/22  5:01 AM   Result Value Ref Range    Cholesterol 219 (H) See Comment mg/dL    Triglycerides 200 (H) See Comment mg/dL    HDL, Direct 50 >=50 mg/dL    LDL Calculated 129 (H) 0 - 100 mg/dL   Hemoglobin A1c w/EAG Estimation    Collection Time: 09/02/22  5:01 AM   Result Value Ref Range    Hemoglobin A1C 5 6 Normal 3 8-5 6%; PreDiabetic 5 7-6 4%;  Diabetic >=6 5%; Glycemic control for adults with diabetes <7 0% %     mg/dl   Basic metabolic panel    Collection Time: 09/02/22  5:01 AM   Result Value Ref Range    Sodium 140 135 - 147 mmol/L    Potassium 3 3 (L) 3 5 - 5 3 mmol/L    Chloride 104 96 - 108 mmol/L    CO2 27 21 - 32 mmol/L    ANION GAP 9 4 - 13 mmol/L    BUN 22 5 - 25 mg/dL    Creatinine 0 87 0 60 - 1 30 mg/dL    Glucose 103 65 - 140 mg/dL    Calcium 9 5 8 3 - 10 1 mg/dL    eGFR 70 ml/min/1 73sq m   Magnesium    Collection Time: 09/02/22  5:01 AM   Result Value Ref Range    Magnesium 2 7 (H) 1 6 - 2 6 mg/dL   CBC (With Platelets)    Collection Time: 09/02/22  5:01 AM   Result Value Ref Range    WBC 7 14 4 31 - 10 16 Thousand/uL    RBC 4 24 3 81 - 5 12 Million/uL    Hemoglobin 12 7 11 5 - 15 4 g/dL    Hematocrit 38 9 34 8 - 46 1 %    MCV 92 82 - 98 fL    MCH 30 0 26 8 - 34 3 pg    MCHC 32 6 31 4 - 37 4 g/dL    RDW 13 0 11 6 - 15 1 %    Platelets 096 606 - 358 Thousands/uL    MPV 9 4 8 9 - 12 7 fL   Echo complete w/ contrast if indicated    Collection Time: 09/02/22  7:59 AM   Result Value Ref Range    AV peak gradient 68 mmHg    LA size 4 cm    Triscuspid Valve Regurgitation Peak Gradient 28 0 mmHg    Tricuspid valve peak regurgitation velocity 2 65 m/s    LVPWd 1 50 cm    Left Atrium Area-systolic Apical Two Chamber 19 9 cm2    Left Atrium Area-systolic Four Chamber 20 cm2    MV E' Tissue Velocity Septal 5 cm/s    TR Peak Johan 2 7 m/s    IVSd 4 53 cm    LV DIASTOLIC VOLUME (MOD BIPLANE) 2D 69 mL    LEFT VENTRICLE SYSTOLIC VOLUME (MOD BIPLANE) 2D 24 mL    Left ventricular stroke volume (2D) 46 00 mL    AV Deceleration Time 2,481 ms    A4C EF 48 %    LA length (A2C) 5 40 cm    LVIDd 4 00 cm    IVS 1 5 cm    LVIDS 2 60 cm    FS 35 28 - 44 %    Asc Ao 2 9 cm    Ao root 3 20 cm    RVID d 4 2 cm    AV regurgitation pressure 1/2 time 720 ms    MV valve area p 1/2 method 3 14 cm2    E wave deceleration time 243 ms    LVOT diameter 2 0 cm    MV Peak E Johan 56 cm/s    MV Peak A Johan 0 83 m/s    RAA A4C 17 7 cm2    MV stenosis pressure 1/2 time 70 ms    LVSV, 2D 46 mL    LVOT area 3 14 cm2    LV EF 65     Est  RA pres 5 0 mmHg    Right Ventricular Peak Systolic Pressure 21 13 mmHg       Imaging: I have personally reviewed pertinent imaging in PACS, and I have personally reviewed PACS reports  EKG, Pathology, and Other Studies: I have personally reviewed pertinent reports         VTE Prophylaxis: Sequential compression device (Venodyne) and Eliquis        Total time spent today 24 minutes  Greater than 50% of total time was spent with the patient and / or family counseling and / or coordination of care  A description of the counseling / coordination of care: Patient seen and evaluated  Case reviewed with attending  Chart thoroughly reviewed including imaging and labs  Coordination of care with RN  Discussion of imaging, medications, and follow up with patient

## 2022-09-02 NOTE — PHYSICAL THERAPY NOTE
Physical Therapy Evaluation    Patient Name: Tiffanie Levy    Today's Date: 9/2/2022     Problem List  Principal Problem:    Stroke-like symptoms  Active Problems:    Asthma due to seasonal allergies    Headache    Paroxysmal atrial fibrillation (HCC)    Acute cystitis without hematuria    Localized swelling of left lower extremity       Past Medical History  Past Medical History:   Diagnosis Date    A-fib ([facility] [address] ) 03/2020    Allergic     Anxiety     Arthritis     Asthma     Colon polyp     Depression     GERD (gastroesophageal reflux disease)     Heart murmur     Hives     Hyperlipidemia     Hypertension     Infertility, female     Migraine     Palpitations     Psychiatric disorder     Wears glasses         Past Surgical History  Past Surgical History:   Procedure Laterality Date    COLONOSCOPY      EGD      EXPLORATORY LAPAROTOMY      for infertility    HAND SURGERY      Hand excision of tendon cyst    MD LAP,APPENDECTOMY N/A 5/3/2022    Procedure: APPENDECTOMY LAPAROSCOPIC;  Surgeon: Kiko Reyna MD;  Location: [facility];  Service: General    SUPERIOR [address] [place] ARTERY STENT      WISDOM TOOTH EXTRACTION             09/02/22 1021   PT Last Visit   PT Visit Date 09/02/22   Note Type   Note type Evaluation   Pain Assessment   Pain Assessment Tool 0-10   Pain Score 5   Pain Location/Orientation Orientation: Lower; Location: Back;Orientation: Left; Location: Face   Patient's Stated Pain Goal No pain   Hospital Pain Intervention(s) Repositioned; Ambulation/increased activity; Emotional support   Restrictions/Precautions   Weight Bearing Precautions Per Order No   Other Precautions Telemetry   Home Living   Type of Home Apartment   Home Layout One level  (1st floor apt, 0 [address])   Bathroom Shower/Tub Tub/shower unit   Bathroom Toilet Standard   Additional Comments no DME or AD use PTA   Prior Function   Level of [place] Independent with ADLs and functional mobility Lives With Alone   Receives Help From Friend(s)   ADL Assistance Independent   IADLs Independent   Falls in the last 6 months 0   General   Family/Caregiver Present No   Cognition   Overall Cognitive Status WFL   Arousal/Participation Cooperative   Attention Within functional limits   Orientation Level Oriented X4   Memory Within functional limits   Following Commands Follows all commands and directions without difficulty   LUE Assessment   LUE Assessment   (decreased sensation compared to R (85% intact per pt), incoordinated compared to R)   RLE Assessment   RLE Assessment WFL   LLE Assessment   LLE Assessment WFL   Coordination   Finger to Nose & Finger to Finger  Intact   Heel to Shin Intact   Rapid Alternating Movements Intact   Light Touch   RLE Light Touch Grossly intact   LLE Light Touch Grossly intact   Sharp/Dull   LLE Sharp/Dull Not tested  (reports slight impairments with testing by neuro resident/MD)   Bed Mobility   Supine to Sit Unable to assess   Sit to Supine Unable to assess   Additional Comments seated in chair upon entry and remained seated EOB upon conclusion   Transfers   Sit to Stand 6  Modified independent   Stand to Sit 6  Modified independent   Stand pivot 6  Modified independent   Additional Comments no AD   Ambulation/Elevation   Gait pattern WNL   Gait Assistance 6  Modified independent   Assistive Device None   Distance 100'   Ambulation/Elevation Additional Comments no LOB or unsteadiness noted   Balance   Static Sitting Normal   Dynamic Sitting Good   Static Standing Fair +   Dynamic Standing Fair +   Ambulatory Fair +   Endurance Deficit   Endurance Deficit No   Activity Tolerance   Activity Tolerance Patient tolerated treatment well   Medical Staff Made Aware Lucy MARCIAL   Nurse Made Aware pt cleared to see and mobilize per nursing   Assessment   Prognosis Good   Problem List Impaired sensation;Decreased coordination   Assessment Pt is a 57 y o  female admitted to [facility] on 9/1/2022 with difficulty speaking, L sided weakness and sensory impairements  Pt received a primary medical dx of stroke like symptoms  Pt has the following comorbidities which affect their treatment: h/o a-fib, anxiety, arthritis, asthma, depression, HLD, HTN, migraine, psychiatric disorder, as well as personal factors including living alone  Pt has a moderate complexity clinical presentation due to Ongoing medical management for primary dx, Fall risk, Ongoing telemetry monitoring, Trending lab values, Diagnostic imaging pending, Continuous pulse oximetry monitoring  , and PMH  PT was consulted to evaluate pt's functional mobility and discharge needs  Upon evaluation, patient required mod I for all mobility as outlined above  Pt's functional impairments include: impaired sensation (L side) and increased pain  At conclusion of eval, pt remained seated EOB with phone, call bell, and all other personal needs within reach  The patient's AM-PAC Basic Mobility Inpatient Short Form Raw Score is 23  A Raw score of greater than 16 suggests the patient may benefit from discharge to home  Please also refer to the recommendation of the Physical Therapist for safe discharge planning  At this time, pt has no further acute care PT needs 2* being mod I for all mobility and having supportive friends who can assist as needed  Pt denies any concerns regarding their functional mobility or ability to return home safely at this time  Recommend pt continues to mobilize with nursing and restorative staff during hospital stay  Please consult with any changes in mobility status  D/C recommendations are home, no PT needs anticipated     Barriers to Discharge None   Goals   Patient Goals to go home   Plan   PT Frequency Other (Comment)  (d/c acute care PT)   Recommendation   PT Discharge Recommendation No rehabilitation needs   AM-PAC Basic Mobility Inpatient   Turning in Bed Without Bedrails 4   Lying on Back to Sitting on Edge of Flat Bed 4   Moving Bed to Chair 4   Standing Up From Chair 4   Walk in Room 4   Climb 3-5 Stairs 3   Basic Mobility Inpatient Raw Score 23   Basic Mobility Standardized Score 50 88   Highest Level Of Mobility   -[facility] Goal 7: Walk 25 feet or more   -HL Achieved 7: Walk 25 feet or more   Modified Jyoti Scale   Modified Waldorf Scale 1   Barthel Index   Feeding 10   Bathing 5   Grooming Score 5   Dressing Score 10   Bladder Score 10   Bowels Score 10   Toilet Use Score 10   Transfers (Bed/Chair) Score 15   Mobility (Level Surface) Score 15   Stairs Score 5   Barthel Index Score 95   Govind Meng, PT, DPT

## 2022-09-02 NOTE — RESTORATIVE TECHNICIAN NOTE
Restorative Technician Note      Patient Name: Richarda Koyanagi     Note Type: Mobility  Patient Position Upon Consult: Seated edge of bed  Activity Performed: Ambulated; Dangled; Stood  Assistive Device: Other (Comment) (none)  Education Provided: Yes  Patient Position at End of Consult: Seated edge of bed;  All needs within reach    Demarcus STUART, Restorative Technician, [employer]

## 2022-09-02 NOTE — ASSESSMENT & PLAN NOTE
· Yesterday patient developed left-sided facial numbness around midnight, went to bed woke up around 6:00 a m  continue to have left-sided facial numbness, left arm numbness and left leg weakness  · CT head showed no acute intracranial abnormality  · CTA showed no intracranial large vessel occlusion or critical stenosis, no aneurysm    Progression of atherosclerotic disease in the left cervical carotid artery but a new linear intraluminal or defect at the origin/proximal ICA suggesting an ulcerated plaque resulting in about 80% stenosis of proximal ICA  · Admit under stroke pathway  · KAL brain negative  · Suspect symptoms 2/2 migraine - repeat cocktail today  · Continue aspirin, Eliquis  · Not tolerating statin

## 2022-09-02 NOTE — PROGRESS NOTES
[address] [facility]  Progress Note George Strickland 1960, 57 y o  female MRN: [id number]  Unit/Bed#: [facility] 727-01 Encounter: [id number]  Primary Care Provider: Ana Maria Perera DO   Date and time admitted to hospital: 9/1/2022 10:30 AM    * Stroke-like symptoms  Assessment & Plan  · Yesterday patient developed left-sided facial numbness around midnight, went to bed woke up around 6:00 a m  continue to have left-sided facial numbness, left arm numbness and left leg weakness  · CT head showed no acute intracranial abnormality  · CTA showed no intracranial large vessel occlusion or critical stenosis, no aneurysm  Progression of atherosclerotic disease in the left cervical carotid artery but a new linear intraluminal or defect at the origin/proximal ICA suggesting an ulcerated plaque resulting in about 80% stenosis of proximal ICA  · Admit under stroke pathway  · KAL brain negative  · Suspect symptoms 2/2 migraine - repeat cocktail today  · Continue aspirin, Eliquis  · Not tolerating statin    Localized swelling of left lower extremity  Assessment & Plan  · For history of lower extremity swelling patient is on Lasix 40 mg daily  · She had an unremarkable echo 2020  · Repeat echo as part of a stroke pathway  · Hold Lasix due to permissive hypertension, clinically patient euvolemic    No lower extremity swelling  · Restart lasix    Acute cystitis without hematuria  Assessment & Plan  · Yesterday she finished treatment for UTI    Paroxysmal atrial fibrillation ([facility] [address] )  Assessment & Plan  · Patient is on metoprolol succinate 100 mg b i d    · On Cardizem 120 mg as needed in case of palpitations  · On Eliquis for anticoagulation  · BB was held for permissive HTN, before getting it again she was going into afib with -130s, better after taking home toprol xl    Headache  Assessment & Plan  · History of headaches  · S/p migraine cocktail on admission - helped, repeat cocktail again today per neuro    Asthma due to seasonal allergies  Assessment & Plan  · Not in acute exacerbation, continue inhalers          VTE Pharmacologic Prophylaxis: VTE Score: 3 Moderate Risk (Score 3-4) - Pharmacological DVT Prophylaxis Ordered: apixaban (Eliquis)  Patient Centered Rounds: I performed bedside rounds with nursing staff today  Discussions with Specialists or Other Care Team Provider:     Education and Discussions with Family / Patient: Updated  (friend) via phone  Time Spent for Care: 30 minutes  More than 50% of total time spent on counseling and coordination of care as described above  Current Length of Stay: 1 day(s)  Current Patient Status: Inpatient   Certification Statement: The patient will continue to require additional inpatient hospital stay due to monitor symptoms, afib  Discharge Plan: Anticipate discharge tomorrow to home  Code Status: Level 1 - Full Code    Subjective:   Didn't sleep last night, doenst feel well, feels anxious    Objective:     Vitals:   Temp (24hrs), Av °F (36 7 °C), Min:98 °F (36 7 °C), Max:98 1 °F (36 7 °C)    Temp:  [98 °F (36 7 °C)-98 1 °F (36 7 °C)] 98 1 °F (36 7 °C)  HR:  [65-81] 81  Resp:  [16-18] 17  BP: (137-155)/(64-87) 137/64  SpO2:  [90 %-97 %] 90 %  Body mass index is 35 02 kg/m²  Input and Output Summary (last 24 hours):   No intake or output data in the 24 hours ending 22 9388    Physical Exam:   Physical Exam  Vitals reviewed  HENT:      Head: Normocephalic and atraumatic  Cardiovascular:      Rate and Rhythm: Normal rate and regular rhythm  Heart sounds: Normal heart sounds  Pulmonary:      Effort: Pulmonary effort is normal  No respiratory distress  Breath sounds: Normal breath sounds  No wheezing  Abdominal:      General: There is no distension  Palpations: Abdomen is soft  Tenderness: There is no abdominal tenderness  Neurological:      Mental Status: She is alert            Additional Data: Labs:  Results from last 7 days   Lab Units 09/02/22  0501   WBC Thousand/uL 7 14   HEMOGLOBIN g/dL 12 7   HEMATOCRIT % 38 9   PLATELETS Thousands/uL 220     Results from last 7 days   Lab Units 09/02/22  0501   SODIUM mmol/L 140   POTASSIUM mmol/L 3 3*   CHLORIDE mmol/L 104   CO2 mmol/L 27   BUN mg/dL 22   CREATININE mg/dL 0 87   ANION GAP mmol/L 9   CALCIUM mg/dL 9 5   GLUCOSE RANDOM mg/dL 103     Results from last 7 days   Lab Units 09/01/22  1040   INR  1 12     Results from last 7 days   Lab Units 09/01/22  1033   POC GLUCOSE mg/dl 122     Results from last 7 days   Lab Units 09/02/22  0501   HEMOGLOBIN A1C % 5 6           Lines/Drains:  Invasive Devices  Report    Peripheral Intravenous Line  Duration           Peripheral IV 09/02/22 Left Arm <1 day                  Telemetry:  Telemetry Orders (From admission, onward)             48 Hour Telemetry Monitoring  Continuous x 48 hours        References:    Telemetry Guidelines   Question:  Reason for 48 Hour Telemetry  Answer:  Acute CVA (<24 hrs old, hemispheric strokes, selected brainstem strokes, cardiac arrhythmias)                 Telemetry Reviewed: Atrial fibrillation   HR averaging 120-130s  Indication for Continued Telemetry Use: Arrthymias requiring medical therapy             Imaging: Reviewed radiology reports from this admission including: MRI brain    Recent Cultures (last 7 days):         Last 24 Hours Medication List:   Current Facility-Administered Medications   Medication Dose Route Frequency Provider Last Rate    acetaminophen  650 mg Oral Q8H PRN Daisy Arreaga MD      apixaban  5 mg Oral BID Daisy Arreaga MD      aspirin  81 mg Oral Daily Daisy Arreaga MD      fluticasone-vilanterol  1 puff Inhalation Daily Daisy Arreaga MD      furosemide  40 mg Oral Daily Manisha Challenger, MD      labetalol  10 mg Intravenous Q6H PRN Daisy Arreaga MD      LORazepam  0 5 mg Oral Q8H PRN MD Najma Graves metoprolol succinate  100 mg Oral BID Eugenie Hogue MD      montelukast  10 mg Oral HS Ethel Cedeno MD      ondansetron  4 mg Oral Q6H PRN Eugenie Hogue MD      pantoprazole  40 mg Oral Early Morning Jaydon aMrtin MD      sucralfate  1 g Oral 4x Daily Jaydon Martin MD      traMADol  50 mg Oral TID PRN Jaydon Martin MD          Today, Patient Was Seen By: Eugenie Hogue MD    **Please Note: This note may have been constructed using a voice recognition system  **

## 2022-09-02 NOTE — ASSESSMENT & PLAN NOTE
· History of headaches  · S/p migraine cocktail on admission - helped, repeat cocktail again today per neuro

## 2022-09-03 LAB
ANION GAP SERPL CALCULATED.3IONS-SCNC: 6 MMOL/L (ref 4–13)
BUN SERPL-MCNC: 25 MG/DL (ref 5–25)
CALCIUM SERPL-MCNC: 9.2 MG/DL (ref 8.3–10.1)
CHLORIDE SERPL-SCNC: 105 MMOL/L (ref 96–108)
CO2 SERPL-SCNC: 27 MMOL/L (ref 21–32)
CREAT SERPL-MCNC: 1.03 MG/DL (ref 0.6–1.3)
GFR SERPL CREATININE-BSD FRML MDRD: 57 ML/MIN/1.73SQ M
GLUCOSE SERPL-MCNC: 131 MG/DL (ref 65–140)
POTASSIUM SERPL-SCNC: 3.4 MMOL/L (ref 3.5–5.3)
SODIUM SERPL-SCNC: 138 MMOL/L (ref 135–147)

## 2022-09-03 PROCEDURE — 80048 BASIC METABOLIC PNL TOTAL CA: CPT | Performed by: HOSPITALIST

## 2022-09-03 PROCEDURE — 99232 SBSQ HOSP IP/OBS MODERATE 35: CPT | Performed by: HOSPITALIST

## 2022-09-03 RX ORDER — DILTIAZEM HYDROCHLORIDE 120 MG/1
120 CAPSULE, COATED, EXTENDED RELEASE ORAL DAILY
Status: DISCONTINUED | OUTPATIENT
Start: 2022-09-03 | End: 2022-09-04 | Stop reason: HOSPADM

## 2022-09-03 RX ORDER — POTASSIUM CHLORIDE 20 MEQ/1
40 TABLET, EXTENDED RELEASE ORAL EVERY 4 HOURS
Status: COMPLETED | OUTPATIENT
Start: 2022-09-03 | End: 2022-09-03

## 2022-09-03 RX ORDER — ASPIRIN 81 MG/1
81 TABLET, CHEWABLE ORAL DAILY
Refills: 0
Start: 2022-09-04

## 2022-09-03 RX ADMIN — METOPROLOL SUCCINATE 100 MG: 100 TABLET, EXTENDED RELEASE ORAL at 21:07

## 2022-09-03 RX ADMIN — FLUTICASONE FUROATE AND VILANTEROL TRIFENATATE 1 PUFF: 200; 25 POWDER RESPIRATORY (INHALATION) at 08:18

## 2022-09-03 RX ADMIN — MONTELUKAST 10 MG: 10 TABLET, FILM COATED ORAL at 21:08

## 2022-09-03 RX ADMIN — ACETAMINOPHEN 650 MG: 325 TABLET ORAL at 12:44

## 2022-09-03 RX ADMIN — SUCRALFATE 1 G: 1 TABLET ORAL at 21:08

## 2022-09-03 RX ADMIN — LORAZEPAM 0.5 MG: 0.5 TABLET ORAL at 21:08

## 2022-09-03 RX ADMIN — ACETAMINOPHEN 650 MG: 325 TABLET ORAL at 21:07

## 2022-09-03 RX ADMIN — SUCRALFATE 1 G: 1 TABLET ORAL at 16:44

## 2022-09-03 RX ADMIN — ASPIRIN 81 MG CHEWABLE TABLET 81 MG: 81 TABLET CHEWABLE at 08:17

## 2022-09-03 RX ADMIN — PANTOPRAZOLE SODIUM 40 MG: 40 TABLET, DELAYED RELEASE ORAL at 04:31

## 2022-09-03 RX ADMIN — LORAZEPAM 0.5 MG: 0.5 TABLET ORAL at 12:44

## 2022-09-03 RX ADMIN — APIXABAN 5 MG: 5 TABLET, FILM COATED ORAL at 08:17

## 2022-09-03 RX ADMIN — SUCRALFATE 1 G: 1 TABLET ORAL at 08:17

## 2022-09-03 RX ADMIN — DILTIAZEM HYDROCHLORIDE 120 MG: 120 CAPSULE, COATED, EXTENDED RELEASE ORAL at 12:40

## 2022-09-03 RX ADMIN — FUROSEMIDE 40 MG: 40 TABLET ORAL at 08:21

## 2022-09-03 RX ADMIN — POTASSIUM CHLORIDE 40 MEQ: 1500 TABLET, EXTENDED RELEASE ORAL at 22:51

## 2022-09-03 RX ADMIN — POTASSIUM CHLORIDE 40 MEQ: 1500 TABLET, EXTENDED RELEASE ORAL at 18:49

## 2022-09-03 RX ADMIN — METOPROLOL SUCCINATE 100 MG: 100 TABLET, EXTENDED RELEASE ORAL at 08:21

## 2022-09-03 RX ADMIN — APIXABAN 5 MG: 5 TABLET, FILM COATED ORAL at 16:44

## 2022-09-03 RX ADMIN — LORAZEPAM 0.5 MG: 0.5 TABLET ORAL at 04:31

## 2022-09-03 RX ADMIN — SUCRALFATE 1 G: 1 TABLET ORAL at 11:21

## 2022-09-03 NOTE — PROGRESS NOTES
[address] [facility]  Progress Note Loulou Alu 1960, 57 y o  female MRN: [id number]  Unit/Bed#: [facility] 727-01 Encounter: [id number]  Primary Care Provider: Kecia Amor DO   Date and time admitted to hospital: 9/1/2022 10:30 AM    * Stroke-like symptoms  Assessment & Plan  · Yesterday patient developed left-sided facial numbness around midnight, went to bed woke up around 6:00 a m  continue to have left-sided facial numbness, left arm numbness and left leg weakness  · CT head showed no acute intracranial abnormality  · CTA showed no intracranial large vessel occlusion or critical stenosis, no aneurysm  Progression of atherosclerotic disease in the left cervical carotid artery but a new linear intraluminal or defect at the origin/proximal ICA suggesting an ulcerated plaque resulting in about 80% stenosis of proximal ICA  · Admitted under stroke pathway  · MRI brain negative  · Suspect symptoms 2/2 migraine - repeat cocktail 2nd day; now better  · Continue aspirin, Eliquis  · Not tolerating statin    Carotid artery stenosis  Assessment & Plan  · Left ICA 80% stenosis found in her CTA  · Already on aspirin, repatha  · OP f/u with vascular Sx who she sees for SMA stenting    Localized swelling of left lower extremity  Assessment & Plan  · For history of lower extremity swelling patient is on Lasix 40 mg daily  · She had an unremarkable echo 2020  · Repeat echo as part of a stroke pathway  · Hold Lasix due to permissive hypertension, clinically patient euvolemic    No lower extremity swelling  · Restart lasix    Acute cystitis without hematuria  Assessment & Plan  · Yesterday she finished treatment for UTI  · H/o interstitial cystitis per patient    Paroxysmal atrial fibrillation ([facility] [address] )  Assessment & Plan  · Patient is on metoprolol succinate 100 mg b i d    · On Cardizem 120 mg as needed in case of palpitations  · On Eliquis for anticoagulation  · BB was held for permissive HTN, before getting it again she was going into afib with -130s, better after taking home toprol xl  · However before discharging her again she went into afib RVR -> placed her on her cardizem 120 mg QD & monitor    Headache  Assessment & Plan  · History of headaches  · S/p migraine cocktail on admission - helped, repeat cocktail again on 2 nd day per neuro    Asthma due to seasonal allergies  Assessment & Plan  · Not in acute exacerbation, continue inhalers          VTE Pharmacologic Prophylaxis: VTE Score: 3 Moderate Risk (Score 3-4) - Pharmacological DVT Prophylaxis Ordered: apixaban (Eliquis)  Patient Centered Rounds: I performed bedside rounds with nursing staff today  Discussions with Specialists or Other Care Team Provider:     Education and Discussions with Family / Patient: patient  Time Spent for Care: 30 minutes  More than 50% of total time spent on counseling and coordination of care as described above  Current Length of Stay: 2 day(s)  Current Patient Status: Inpatient   Certification Statement: The patient will continue to require additional inpatient hospital stay due to monitor afib  Discharge Plan: Anticipate discharge tomorrow to home  Code Status: Level 1 - Full Code    Subjective:   Feels better today    Objective:     Vitals:   Temp (24hrs), Av 3 °F (36 8 °C), Min:98 °F (36 7 °C), Max:98 6 °F (37 °C)    Temp:  [98 °F (36 7 °C)-98 6 °F (37 °C)] 98 6 °F (37 °C)  HR:  [70-79] 77  Resp:  [16-18] 17  BP: (109-163)/(59-86) 140/64  SpO2:  [90 %-96 %] 94 %  Body mass index is 35 02 kg/m²  Input and Output Summary (last 24 hours):   No intake or output data in the 24 hours ending 22 7248    Physical Exam:   Physical Exam  Vitals reviewed  HENT:      Head: Normocephalic and atraumatic  Cardiovascular:      Rate and Rhythm: Normal rate and regular rhythm  Heart sounds: Normal heart sounds  Pulmonary:      Effort: Pulmonary effort is normal  No respiratory distress  Breath sounds: Normal breath sounds  No wheezing  Abdominal:      General: There is no distension  Palpations: Abdomen is soft  Tenderness: There is no abdominal tenderness  Musculoskeletal:      Right lower leg: No edema  Left lower leg: No edema  Skin:     General: Skin is warm  Neurological:      Mental Status: She is alert and oriented to person, place, and time  Additional Data:     Labs:  Results from last 7 days   Lab Units 22  0501   WBC Thousand/uL 7 14   HEMOGLOBIN g/dL 12 7   HEMATOCRIT % 38 9   PLATELETS Thousands/uL 220     Results from last 7 days   Lab Units 22  0501   SODIUM mmol/L 140   POTASSIUM mmol/L 3 3*   CHLORIDE mmol/L 104   CO2 mmol/L 27   BUN mg/dL 22   CREATININE mg/dL 0 87   ANION GAP mmol/L 9   CALCIUM mg/dL 9 5   GLUCOSE RANDOM mg/dL 103     Results from last 7 days   Lab Units 22  1040   INR  1 12     Results from last 7 days   Lab Units 22  1033   POC GLUCOSE mg/dl 122     Results from last 7 days   Lab Units 22  0501   HEMOGLOBIN A1C % 5 6           Lines/Drains:  Invasive Devices  Report    Peripheral Intravenous Line  Duration           Peripheral IV 22 Left Arm 1 day                  Telemetry:  Telemetry Orders (From admission, onward)             48 Hour Telemetry Monitoring  Continuous x 48 hours           References:    Telemetry Guidelines   Question:  Reason for 48 Hour Telemetry  Answer:  Acute CVA (<24 hrs old, hemispheric strokes, selected brainstem strokes, cardiac arrhythmias)                 Telemetry Reviewed: Atrial fibrillation  HR averaging 120-130s  Indication for Continued Telemetry Use: Arrthymias requiring medical therapy             Imaging: No pertinent imaging reviewed      Recent Cultures (last 7 days):         Last 24 Hours Medication List:   Current Facility-Administered Medications   Medication Dose Route Frequency Provider Last Rate    acetaminophen  650 mg Oral Q8H PRN Ethel MD Pao      apixaban  5 mg Oral BID Gallito Miranda MD      aspirin  81 mg Oral Daily Gallito Miranda MD      diltiazem  120 mg Oral Daily Yohana Coats MD      fluticasone-vilanterol  1 puff Inhalation Daily Gallito Miranda MD      furosemide  40 mg Oral Daily Yohana Coats MD      labetalol  10 mg Intravenous Q6H PRN Gallito Miranda MD      LORazepam  0 5 mg Oral Q8H PRN Gallito Miranda MD      metoprolol succinate  100 mg Oral BID Yohana Coats MD      montelukast  10 mg Oral HS Gallito Miranda MD      ondansetron  4 mg Oral Q6H PRN Yohana Coats MD      pantoprazole  40 mg Oral Early Morning Gallito Miranda MD      sucralfate  1 g Oral 4x Daily Gallito Miranda MD      traMADol  50 mg Oral TID PRN Gallito Miranda MD          Today, Patient Was Seen By: Yohana Coats MD    **Please Note: This note may have been constructed using a voice recognition system  **

## 2022-09-03 NOTE — ASSESSMENT & PLAN NOTE
· History of headaches  · S/p migraine cocktail on admission - helped, repeat cocktail again on 2 nd day per neuro

## 2022-09-03 NOTE — ASSESSMENT & PLAN NOTE
· For history of lower extremity swelling patient is on Lasix 40 mg daily  · She had an unremarkable echo 2020  · Repeat echo as part of a stroke pathway  · Hold Lasix due to permissive hypertension, clinically patient euvolemic    No lower extremity swelling  · Restart lasix

## 2022-09-03 NOTE — PLAN OF CARE
Problem: Potential for Falls  Goal: Patient will remain free of falls  Description: INTERVENTIONS:  - Educate patient/family on patient safety including physical limitations  - Instruct patient to call for assistance with activity   - Consult OT/PT to assist with strengthening/mobility   - Keep Call bell within reach  - Keep bed low and locked with side rails adjusted as appropriate  - Keep care items and personal belongings within reach  - Initiate and maintain comfort rounds  - Make Fall Risk Sign visible to staff  - Apply yellow socks and bracelet for high fall risk patients  - Consider moving patient to room near nurses station  Outcome: Progressing     Problem: PAIN - ADULT  Goal: Verbalizes/displays adequate comfort level or baseline comfort level  Description: Interventions:  - Encourage patient to monitor pain and request assistance  - Assess pain using appropriate pain scale  - Administer analgesics based on type and severity of pain and evaluate response  - Implement non-pharmacological measures as appropriate and evaluate response  - Consider cultural and social influences on pain and pain management  - Notify physician/advanced practitioner if interventions unsuccessful or patient reports new pain  Outcome: Progressing     Problem: INFECTION - ADULT  Goal: Absence or prevention of progression during hospitalization  Description: INTERVENTIONS:  - Assess and monitor for signs and symptoms of infection  - Monitor lab/diagnostic results  - Monitor all insertion sites, i e  indwelling lines, tubes, and drains  - Monitor endotracheal if appropriate and nasal secretions for changes in amount and color  - [place] appropriate cooling/warming therapies per order  - Administer medications as ordered  - Instruct and encourage patient and family to use good hand hygiene technique  - Identify and instruct in appropriate isolation precautions for identified infection/condition  Outcome: Progressing  Goal: Absence of fever/infection during neutropenic period  Description: INTERVENTIONS:  - Monitor WBC    Outcome: Progressing     Problem: SAFETY ADULT  Goal: Patient will remain free of falls  Description: INTERVENTIONS:  - Educate patient/family on patient safety including physical limitations  - Instruct patient to call for assistance with activity   - Consult OT/PT to assist with strengthening/mobility   - Keep Call bell within reach  - Keep bed low and locked with side rails adjusted as appropriate  - Keep care items and personal belongings within reach  - Initiate and maintain comfort rounds  - Make Fall Risk Sign visible to staff  - Apply yellow socks and bracelet for high fall risk patients  - Consider moving patient to room near nurses station  Outcome: Progressing  Goal: Maintain or return to baseline ADL function  Description: INTERVENTIONS:  -  Assess patient's ability to carry out ADLs; assess patient's baseline for ADL function and identify physical deficits which impact ability to perform ADLs (bathing, care of mouth/teeth, toileting, grooming, dressing, etc )  - Assess/evaluate cause of self-care deficits   - Assess range of motion  - Assess patient's mobility; develop plan if impaired  - Assess patient's need for assistive devices and provide as appropriate  - Encourage maximum independence but intervene and supervise when necessary  - Involve family in performance of ADLs  - Assess for home care needs following discharge   - Consider OT consult to assist with ADL evaluation and planning for discharge  - Provide patient education as appropriate  Outcome: Progressing  Goal: Maintains/Returns to pre admission functional level  Description: INTERVENTIONS:  - Perform BMAT or MOVE assessment daily    - Set and communicate daily mobility goal to care team and patient/family/caregiver  - Collaborate with rehabilitation services on mobility goals if consulted  - Perform Range of Motion 2 times a day    - Reposition patient every 2 hours  - Dangle patient 2 times a day  - Stand patient 2 times a day  - Ambulate patient 2 times a day  - Out of bed to chair 2 times a day   - Out of bed for meals 2 times a day  - Out of bed for toileting  - Record patient progress and toleration of activity level   Outcome: Progressing     Problem: DISCHARGE PLANNING  Goal: Discharge to home or other facility with appropriate resources  Description: INTERVENTIONS:  - Identify barriers to discharge w/patient and caregiver  - Arrange for needed discharge resources and transportation as appropriate  - Identify discharge learning needs (meds, wound care, etc )  - Arrange for interpretive services to assist at discharge as needed  - Refer to Case Management Department for coordinating discharge planning if the patient needs post-hospital services based on physician/advanced practitioner order or complex needs related to functional status, cognitive ability, or social support system  Outcome: Progressing     Problem: Knowledge Deficit  Goal: Patient/family/caregiver demonstrates understanding of disease process, treatment plan, medications, and discharge instructions  Description: Complete learning assessment and assess knowledge base    Interventions:  - Provide teaching at level of understanding  - Provide teaching via preferred learning methods  Outcome: Progressing     Problem: NEUROSENSORY - ADULT  Goal: Achieves stable or improved neurological status  Description: INTERVENTIONS  - Monitor and report changes in neurological status  - Monitor vital signs such as temperature, blood pressure, glucose, and any other labs ordered   - Initiate measures to prevent increased intracranial pressure  - Monitor for seizure activity and implement precautions if appropriate      Outcome: Progressing     Problem: Nutrition/Hydration-ADULT  Goal: Nutrient/Hydration intake appropriate for improving, restoring or maintaining nutritional needs  Description: Monitor and assess patient's nutrition/hydration status for malnutrition  Collaborate with interdisciplinary team and initiate plan and interventions as ordered  Monitor patient's weight and dietary intake as ordered or per policy  Utilize nutrition screening tool and intervene as necessary  Determine patient's food preferences and provide high-protein, high-caloric foods as appropriate       INTERVENTIONS:  - Monitor oral intake, urinary output, labs, and treatment plans  - Assess nutrition and hydration status and recommend course of action  - Evaluate amount of meals eaten  - Assist patient with eating if necessary   - Allow adequate time for meals  - Recommend/ encourage appropriate diets, oral nutritional supplements, and vitamin/mineral supplements  - Order, calculate, and assess calorie counts as needed  - Recommend, monitor, and adjust tube feedings and TPN/PPN based on assessed needs  - Assess need for intravenous fluids  - Provide specific nutrition/hydration education as appropriate  - Include patient/family/caregiver in decisions related to nutrition  Outcome: Progressing

## 2022-09-03 NOTE — ASSESSMENT & PLAN NOTE
· Yesterday patient developed left-sided facial numbness around midnight, went to bed woke up around 6:00 a m  continue to have left-sided facial numbness, left arm numbness and left leg weakness  · CT head showed no acute intracranial abnormality  · CTA showed no intracranial large vessel occlusion or critical stenosis, no aneurysm    Progression of atherosclerotic disease in the left cervical carotid artery but a new linear intraluminal or defect at the origin/proximal ICA suggesting an ulcerated plaque resulting in about 80% stenosis of proximal ICA  · Admitted under stroke pathway  · MRI brain negative  · Suspect symptoms 2/2 migraine - repeat cocktail 2nd day; now better  · Continue aspirin, Eliquis  · Not tolerating statin

## 2022-09-03 NOTE — ASSESSMENT & PLAN NOTE
· Patient is on metoprolol succinate 100 mg b i d    · On Cardizem 120 mg as needed in case of palpitations  · On Eliquis for anticoagulation  · BB was held for permissive HTN, before getting it again she was going into afib with -130s, better after taking home toprol xl  · However before discharging her again she went into afib RVR -> placed her on her cardizem 120 mg QD & monitor

## 2022-09-03 NOTE — ASSESSMENT & PLAN NOTE
· Left ICA 80% stenosis found in her CTA  · Already on aspirin, repatha  · OP f/u with vascular Sx who she sees for SMA stenting

## 2022-09-04 VITALS
TEMPERATURE: 97.7 F | HEIGHT: 64 IN | SYSTOLIC BLOOD PRESSURE: 144 MMHG | WEIGHT: 204 LBS | HEART RATE: 69 BPM | OXYGEN SATURATION: 92 % | RESPIRATION RATE: 18 BRPM | DIASTOLIC BLOOD PRESSURE: 68 MMHG | BODY MASS INDEX: 34.83 KG/M2

## 2022-09-04 PROCEDURE — 99239 HOSP IP/OBS DSCHRG MGMT >30: CPT | Performed by: HOSPITALIST

## 2022-09-04 RX ORDER — SPIRONOLACTONE 25 MG/1
25 TABLET ORAL 2 TIMES DAILY
Status: DISCONTINUED | OUTPATIENT
Start: 2022-09-04 | End: 2022-09-04 | Stop reason: HOSPADM

## 2022-09-04 RX ADMIN — ACETAMINOPHEN 650 MG: 325 TABLET ORAL at 08:22

## 2022-09-04 RX ADMIN — SUCRALFATE 1 G: 1 TABLET ORAL at 08:13

## 2022-09-04 RX ADMIN — DILTIAZEM HYDROCHLORIDE 120 MG: 120 CAPSULE, COATED, EXTENDED RELEASE ORAL at 08:15

## 2022-09-04 RX ADMIN — METOPROLOL SUCCINATE 100 MG: 100 TABLET, EXTENDED RELEASE ORAL at 08:15

## 2022-09-04 RX ADMIN — SPIRONOLACTONE 25 MG: 25 TABLET ORAL at 11:45

## 2022-09-04 RX ADMIN — ASPIRIN 81 MG CHEWABLE TABLET 81 MG: 81 TABLET CHEWABLE at 08:12

## 2022-09-04 RX ADMIN — FUROSEMIDE 40 MG: 40 TABLET ORAL at 08:15

## 2022-09-04 RX ADMIN — FLUTICASONE FUROATE AND VILANTEROL TRIFENATATE 1 PUFF: 200; 25 POWDER RESPIRATORY (INHALATION) at 08:16

## 2022-09-04 RX ADMIN — PANTOPRAZOLE SODIUM 40 MG: 40 TABLET, DELAYED RELEASE ORAL at 05:15

## 2022-09-04 RX ADMIN — APIXABAN 5 MG: 5 TABLET, FILM COATED ORAL at 08:12

## 2022-09-04 RX ADMIN — LORAZEPAM 0.5 MG: 0.5 TABLET ORAL at 08:22

## 2022-09-04 RX ADMIN — SUCRALFATE 1 G: 1 TABLET ORAL at 11:45

## 2022-09-04 NOTE — ASSESSMENT & PLAN NOTE
· Patient is on metoprolol succinate 100 mg b i d    · On Cardizem 120 mg as needed in case of palpitations  · On Eliquis for anticoagulation  · BB was held for permissive HTN, before getting it again she was going into afib with -130s, better after taking home toprol xl  · However before discharging her again she went into afib RVR -> after receiving her p r n  Cardizem she has been better  · It seems like she uses p r n   Cardizem 2 to 3 times a week

## 2022-09-04 NOTE — ASSESSMENT & PLAN NOTE
· Yesterday patient developed left-sided facial numbness around midnight, went to bed woke up around 6:00 a m  continue to have left-sided facial numbness, left arm numbness and left leg weakness  · CT head showed no acute intracranial abnormality  · CTA showed no intracranial large vessel occlusion or critical stenosis, no aneurysm    Progression of atherosclerotic disease in the left cervical carotid artery but a new linear intraluminal or defect at the origin/proximal ICA suggesting an ulcerated plaque resulting in about 80% stenosis of proximal ICA  · Admitted under stroke pathway  · MRI brain negative  · Suspect symptoms 2/2 migraine - repeated cocktail 2nd day; now better  · Continue aspirin, Eliquis  · Not tolerating statin

## 2022-09-04 NOTE — ASSESSMENT & PLAN NOTE
· For history of lower extremity swelling patient is on Lasix 40 mg daily  · She had an unremarkable echo 2020  · Repeat echo as part of a stroke pathway  · Hold Lasix due to permissive hypertension, clinically patient euvolemic    No lower extremity swelling  · Restart lasix, aldactone

## 2022-09-04 NOTE — PLAN OF CARE
Problem: Potential for Falls  Goal: Patient will remain free of falls  Description: INTERVENTIONS:  - Educate patient/family on patient safety including physical limitations  - Instruct patient to call for assistance with activity   - Consult OT/PT to assist with strengthening/mobility   - Keep Call bell within reach  - Keep bed low and locked with side rails adjusted as appropriate  - Keep care items and personal belongings within reach  - Initiate and maintain comfort rounds  - Make Fall Risk Sign visible to staff  - Apply yellow socks and bracelet for high fall risk patients  - Consider moving patient to room near nurses station  Outcome: Completed     Problem: PAIN - ADULT  Goal: Verbalizes/displays adequate comfort level or baseline comfort level  Description: Interventions:  - Encourage patient to monitor pain and request assistance  - Assess pain using appropriate pain scale  - Administer analgesics based on type and severity of pain and evaluate response  - Implement non-pharmacological measures as appropriate and evaluate response  - Consider cultural and social influences on pain and pain management  - Notify physician/advanced practitioner if interventions unsuccessful or patient reports new pain  Outcome: Completed     Problem: INFECTION - ADULT  Goal: Absence or prevention of progression during hospitalization  Description: INTERVENTIONS:  - Assess and monitor for signs and symptoms of infection  - Monitor lab/diagnostic results  - Monitor all insertion sites, i e  indwelling lines, tubes, and drains  - Monitor endotracheal if appropriate and nasal secretions for changes in amount and color  - [place] appropriate cooling/warming therapies per order  - Administer medications as ordered  - Instruct and encourage patient and family to use good hand hygiene technique  - Identify and instruct in appropriate isolation precautions for identified infection/condition  Outcome: Completed  Goal: Absence of fever/infection during neutropenic period  Description: INTERVENTIONS:  - Monitor WBC    Outcome: Completed     Problem: SAFETY ADULT  Goal: Patient will remain free of falls  Description: INTERVENTIONS:  - Educate patient/family on patient safety including physical limitations  - Instruct patient to call for assistance with activity   - Consult OT/PT to assist with strengthening/mobility   - Keep Call bell within reach  - Keep bed low and locked with side rails adjusted as appropriate  - Keep care items and personal belongings within reach  - Initiate and maintain comfort rounds  - Make Fall Risk Sign visible to staff  - Apply yellow socks and bracelet for high fall risk patients  - Consider moving patient to room near nurses station  Outcome: Completed  Goal: Maintain or return to baseline ADL function  Description: INTERVENTIONS:  -  Assess patient's ability to carry out ADLs; assess patient's baseline for ADL function and identify physical deficits which impact ability to perform ADLs (bathing, care of mouth/teeth, toileting, grooming, dressing, etc )  - Assess/evaluate cause of self-care deficits   - Assess range of motion  - Assess patient's mobility; develop plan if impaired  - Assess patient's need for assistive devices and provide as appropriate  - Encourage maximum independence but intervene and supervise when necessary  - Involve family in performance of ADLs  - Assess for home care needs following discharge   - Consider OT consult to assist with ADL evaluation and planning for discharge  - Provide patient education as appropriate  Outcome: Completed  Goal: Maintains/Returns to pre admission functional level  Description: INTERVENTIONS:  - Perform BMAT or MOVE assessment daily    - Set and communicate daily mobility goal to care team and patient/family/caregiver     - Collaborate with rehabilitation services on mobility goals if consulted  - Ambulate patient 3 times a day  - Out of bed to chair 3 times a day   - Out of bed for meals 3 times a day  - Out of bed for toileting  - Record patient progress and toleration of activity level   Outcome: Completed     Problem: DISCHARGE PLANNING  Goal: Discharge to home or other facility with appropriate resources  Description: INTERVENTIONS:  - Identify barriers to discharge w/patient and caregiver  - Arrange for needed discharge resources and transportation as appropriate  - Identify discharge learning needs (meds, wound care, etc )  - Arrange for interpretive services to assist at discharge as needed  - Refer to Case Management Department for coordinating discharge planning if the patient needs post-hospital services based on physician/advanced practitioner order or complex needs related to functional status, cognitive ability, or social support system  Outcome: Completed     Problem: Knowledge Deficit  Goal: Patient/family/caregiver demonstrates understanding of disease process, treatment plan, medications, and discharge instructions  Description: Complete learning assessment and assess knowledge base  Interventions:  - Provide teaching at level of understanding  - Provide teaching via preferred learning methods  Outcome: Completed     Problem: NEUROSENSORY - ADULT  Goal: Achieves stable or improved neurological status  Description: INTERVENTIONS  - Monitor and report changes in neurological status  - Monitor vital signs such as temperature, blood pressure, glucose, and any other labs ordered   - Initiate measures to prevent increased intracranial pressure  - Monitor for seizure activity and implement precautions if appropriate      Outcome: Completed     Problem: Nutrition/Hydration-ADULT  Goal: Nutrient/Hydration intake appropriate for improving, restoring or maintaining nutritional needs  Description: Monitor and assess patient's nutrition/hydration status for malnutrition  Collaborate with interdisciplinary team and initiate plan and interventions as ordered    Monitor patient's weight and dietary intake as ordered or per policy  Utilize nutrition screening tool and intervene as necessary  Determine patient's food preferences and provide high-protein, high-caloric foods as appropriate       INTERVENTIONS:  - Monitor oral intake, urinary output, labs, and treatment plans  - Assess nutrition and hydration status and recommend course of action  - Evaluate amount of meals eaten  - Assist patient with eating if necessary   - Allow adequate time for meals  - Recommend/ encourage appropriate diets, oral nutritional supplements, and vitamin/mineral supplements  - Order, calculate, and assess calorie counts as needed  - Recommend, monitor, and adjust tube feedings and TPN/PPN based on assessed needs  - Assess need for intravenous fluids  - Provide specific nutrition/hydration education as appropriate  - Include patient/family/caregiver in decisions related to nutrition  Outcome: Completed

## 2022-09-04 NOTE — DISCHARGE SUMMARY
[address] [facility]  Discharge- Nita Lozano 1960, 57 y o  female MRN: [id number]  [facility] Encounter: [id number]  Primary Care Provider: David Nowak DO   Date and time admitted to hospital: 9/1/2022 10:30 AM    * Stroke-like symptoms  Assessment & Plan  · Yesterday patient developed left-sided facial numbness around midnight, went to bed woke up around 6:00 a m  continue to have left-sided facial numbness, left arm numbness and left leg weakness  · CT head showed no acute intracranial abnormality  · CTA showed no intracranial large vessel occlusion or critical stenosis, no aneurysm  Progression of atherosclerotic disease in the left cervical carotid artery but a new linear intraluminal or defect at the origin/proximal ICA suggesting an ulcerated plaque resulting in about 80% stenosis of proximal ICA  · Admitted under stroke pathway  · MRI brain negative  · Suspect symptoms 2/2 migraine - repeated cocktail 2nd day; now better  · Continue aspirin, Eliquis  · Not tolerating statin    Carotid artery stenosis  Assessment & Plan  · Left ICA 80% stenosis found in her CTA  · Already on aspirin, repatha  · OP f/u with vascular Sx who she sees for SMA stenting    Localized swelling of left lower extremity  Assessment & Plan  · For history of lower extremity swelling patient is on Lasix 40 mg daily  · She had an unremarkable echo 2020  · Repeat echo as part of a stroke pathway  · Hold Lasix due to permissive hypertension, clinically patient euvolemic    No lower extremity swelling  · Restart lasix, aldactone    Acute cystitis without hematuria  Assessment & Plan  · Yesterday she finished treatment for UTI  · H/o interstitial cystitis per patient    Paroxysmal atrial fibrillation ([facility] [address] )  Assessment & Plan  · Patient is on metoprolol succinate 100 mg b i d    · On Cardizem 120 mg as needed in case of palpitations  · On Eliquis for anticoagulation  · BB was held for permissive HTN, before getting it again she was going into afib with -130s, better after taking home toprol xl  · However before discharging her again she went into afib RVR -> after receiving her p r n  Cardizem she has been better  · It seems like she uses p r n  Cardizem 2 to 3 times a week    Headache  Assessment & Plan  · History of headaches  · S/p migraine cocktail on admission - helped, repeat cocktail again on 2 nd day per neuro    Asthma due to seasonal allergies  Assessment & Plan  · Not in acute exacerbation, continue inhalers        Medical Problems             Resolved Problems  Date Reviewed: 9/4/2022   None               Discharging Physician / Practitioner: Siri Verdin MD  PCP: Christine Loving DO  Admission Date:   Admission Orders (From admission, onward)     Ordered        09/01/22 1143  [facility],[address] [place]  Once                      Discharge Date: 09/04/22    Consultations During Hospital Stay:  · Neurology      Significant Findings / Test Results:   MRI brain wo contrast   Final Result by Efe Sweeney MD (09/02 0003)      No evidence of acute infarct, intracranial hemorrhage or mass  Workstation performed: [id number]         CT stroke alert brain   Final Result by Poly Kinney MD (09/01 1112)      No acute intracranial CT abnormality  No significant interval change                   I personally discussed this study with Dr Ulysses Sequin on 9/1/2022 at 10:48 AM                 Workstation performed: [id number]         CTA stroke alert (head/neck)   Final Result by Poly Kinney MD (09/01 1111)      1  No intracranial large vessel occlusion or critical stenosis  No aneurysm  2   Progression of atherosclerotic disease in the left cervical carotid artery with a new linear intraluminal defect at the origin/proximal ICA suggesting an ulcerated plaque resulting in about 80% stenosis of proximal ICA        3   Stable atherosclerotic change of right cervical carotid artery without hemodynamically significant stenosis  I personally discussed this study with Dr Suzi Uriostegui on 9/1/2022 at 10:48 AM                 Workstation performed: [id number]           Reason for Admission: Left facial numbness     Hospital Course:   Dimas Diamond is a 57 y o  female patient who originally presented to the hospital on 9/1/2022 with a PMH of paroxysmal atrial fibrillation, hypertension, hyperlipidemia, had a who presents with left facial numbness  Patient was in her your usual state of health the day before, around midnight she developed left facial numbness which occasional happens with her headache and did not thought about it much  She went to bed woke up around 6:00 a m  continue to have left-sided facial numbness, no facial droop  She felt left arm numbness and left arm weakness, came to ED, was a stroke alert      Please see above list of diagnoses and related plan for additional information  Condition at Discharge: stable    Discharge Day Visit / Exam:   Subjective:  Feels well overall, no major complaints    Vitals: Blood Pressure: 144/68 (09/04/22 1142)  Pulse: 69 (09/04/22 1142)  Temperature: 97 7 °F (36 5 °C) (09/04/22 0815)  Temp Source: Oral (09/01/22 1405)  Respirations: 18 (09/03/22 2219)  Height: 5' 4" (162 6 cm) (09/02/22 0739)  Weight - Scale: 92 5 kg (204 lb) (09/02/22 0739)  SpO2: 92 % (09/04/22 1142)  Exam:   Physical Exam  Vitals reviewed  HENT:      Head: Normocephalic and atraumatic  Mouth/Throat:      Mouth: Mucous membranes are moist    Cardiovascular:      Rate and Rhythm: Normal rate and regular rhythm  Heart sounds: Normal heart sounds  Pulmonary:      Effort: Pulmonary effort is normal  No respiratory distress  Breath sounds: Normal breath sounds  No wheezing  Abdominal:      General: There is no distension  Palpations: Abdomen is soft  Tenderness: There is no abdominal tenderness     Musculoskeletal:      Right lower leg: No edema  Left lower leg: No edema  Neurological:      Mental Status: She is alert and oriented to person, place, and time  Discharge instructions/Information to patient and family:   See after visit summary for information provided to patient and family  Provisions for Follow-Up Care:  See after visit summary for information related to follow-up care and any pertinent home health orders  Disposition:   Home    Planned Readmission: no     Discharge Statement:  I spent 40 minutes discharging the patient  This time was spent on the day of discharge  I had direct contact with the patient on the day of discharge  Greater than 50% of the total time was spent examining patient, answering all patient questions, arranging and discussing plan of care with patient as well as directly providing post-discharge instructions  Additional time then spent on discharge activities  Discharge Medications:  See after visit summary for reconciled discharge medications provided to patient and/or family        **Please Note: This note may have been constructed using a voice recognition system**

## 2022-09-04 NOTE — PLAN OF CARE
Problem: PAIN - ADULT  Goal: Verbalizes/displays adequate comfort level or baseline comfort level  Description: Interventions:  - Encourage patient to monitor pain and request assistance  - Assess pain using appropriate pain scale  - Administer analgesics based on type and severity of pain and evaluate response  - Implement non-pharmacological measures as appropriate and evaluate response  - Consider cultural and social influences on pain and pain management  - Notify physician/advanced practitioner if interventions unsuccessful or patient reports new pain  Outcome: Progressing     Problem: INFECTION - ADULT  Goal: Absence or prevention of progression during hospitalization  Description: INTERVENTIONS:  - Assess and monitor for signs and symptoms of infection  - Monitor lab/diagnostic results  - Monitor all insertion sites, i e  indwelling lines, tubes, and drains  - Monitor endotracheal if appropriate and nasal secretions for changes in amount and color  - Lane appropriate cooling/warming therapies per order  - Administer medications as ordered  - Instruct and encourage patient and family to use good hand hygiene technique  - Identify and instruct in appropriate isolation precautions for identified infection/condition  Outcome: Progressing  Goal: Absence of fever/infection during neutropenic period  Description: INTERVENTIONS:  - Monitor WBC    Outcome: Progressing     Problem: NEUROSENSORY - ADULT  Goal: Achieves stable or improved neurological status  Description: INTERVENTIONS  - Monitor and report changes in neurological status  - Monitor vital signs such as temperature, blood pressure, glucose, and any other labs ordered   - Initiate measures to prevent increased intracranial pressure  - Monitor for seizure activity and implement precautions if appropriate      Outcome: Progressing

## 2022-09-04 NOTE — CASE MANAGEMENT
Case Management Discharge Planning Note    Patient name Jitendra Parikh  Location [address] [place] [address] [id number] [id number]  : 1960 Date 2022       Current Admission Date: 2022  Current Admission Diagnosis:Stroke-like symptoms   Patient Active Problem List    Diagnosis Date Noted    Carotid artery stenosis 2022    Stroke-like symptoms 2022    Localized swelling of left lower extremity 2022    Acute CVA (cerebrovascular accident) ([facility] ) 2022    Recurrent UTI 2022    Pain of upper abdomen 2022    Left upper quadrant abdominal pain 2022    S/P appendectomy 2022    Appendix disease 2022    Urinary retention 2022    Peripheral vascular disease ([facility] ) 2022    Hematoma 11/15/2021    Continuous opioid dependence [facility] 11/15/2021    Acute cystitis without hematuria 2021    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    Hypoxia 2021    Chronic bronchitis, unspecified chronic bronchitis type ([facility] ) 2021    Left arm pain 2021    COPD (chronic obstructive pulmonary disease) ([facility] ) 2021    Diverticulitis 2021    Left upper quadrant pain 2021    Well adult exam 2021    Pyelonephritis 2021    Moderate persistent asthma without complication     Vitamin B12 deficiency 2021    Unspecified diastolic (congestive) heart failure ([facility] ) 2021    Shortness of breath 2021    Hypertensive heart disease with congestive heart failure ([facility] ) 2020    CAMILLE (obstructive sleep apnea)     Acute pyelonephritis 2020    Lumbar radiculopathy 10/01/2020    Chronic fatigue 10/01/2020    Wheezing 2020    Injury of toe on right foot 2020    Patellar tendinitis of left knee 2020    Hair loss 2020    WELL[place] (dyspnea on exertion) 2020    Vaginal candidiasis 2020    Paroxysmal atrial fibrillation (HCC) 2020    Low TSH level 02/06/2020    Other chest pain 01/27/2020    Vasomotor rhinitis 01/25/2020    Allergic conjunctivitis of both eyes 01/22/2020    Intrinsic atopic dermatitis 01/22/2020    Angio-edema 01/22/2020    Gastroesophageal reflux disease without esophagitis 08/12/2019    Tinea corporis 07/15/2019    Acute gastric ulcer without hemorrhage or perforation 06/12/2019    Palpitations and chest pain   04/02/2019    Intertrigo 04/02/2019    Allergic reaction 12/03/2018    Insomnia 11/16/2018    Snoring 11/16/2018    Cervicalgia 11/16/2018    Headache 11/16/2018    AMD (age-related macular degeneration), bilateral 11/16/2018    Anxiety 10/19/2018    Angina pectoris ([address] ) 11/15/2017    Female pelvic pain 09/25/2017    Arthritis 08/01/2017    Venous insufficiency 08/01/2017    Tick bite 07/14/2017    Acute upper respiratory infection 02/24/2017    Attention disturbance 01/26/2017    Familial hyperlipidemia 12/02/2016    Allergic rhinitis 10/14/2016    Anxiety and depression 10/14/2016    Asthma due to seasonal allergies 10/14/2016    Benign essential hypertension 10/14/2016    Interstitial cystitis 10/14/2016    Chronic low back pain 10/14/2016    Elevated antinuclear antibody (SON) level 10/14/2016    Elevated blood sugar 10/14/2016    Hyperlipidemia 10/14/2016    Migraines 10/14/2016    Essential hypertension 08/13/2015    Lipid disorder 08/13/2015    Tobacco abuse 08/13/2015    Obesity (BMI 30-39 9) 03/24/2011      LOS (days): 3  Geometric Mean LOS (GMLOS) (days): 2 20  Days to GMLOS:-0 8     OBJECTIVE:  Risk of Unplanned Readmission Score: 23 87         Current admission status: Inpatient   Preferred Pharmacy:   [address], [address] [place] [address]  Phone: [phone] Fax: [phone]    [address], [address] [facility] [address]  [address] [address]  Phone: [phone] Fax: [phone]    Primary Care Provider: Fernanda Ortiz DO    Primary Insurance: [facility]  Secondary Insurance:     DISCHARGE DETAILS:    Discharge planning discussed with[de-identified] CM spoke with the pt at bedside  [place] of Choice: No  Comments - Freedom of Choice: Pt w/o post acute referral needs  CM contacted family/caregiver?: No- see comments  Were Treatment Team discharge recommendations reviewed with patient/caregiver?: Yes  Did patient/caregiver verbalize understanding of patient care needs?: Yes  Were patient/caregiver advised of the risks associated with not following Treatment Team discharge recommendations?: Yes    Contacts  Reason/Outcome: Continuity of Care, Discharge [address]         Is the patient interested in [address] at discharge?: No    DME Referral Provided  Referral made for DME?: No    Other Referral/Resources/Interventions Provided:  Interventions: Transportation  Referral Comments: Lyft transport arranged for discharge home    Would you like to participate in our [address] service program?  : No - Declined    Treatment Team Recommendation: Home  Discharge Destination Plan[de-identified] Home  Transport at Discharge : Ride Share            Accompanied by: Alone              Additional Comments: The pt is medically stable for hospital discharge  PT/OT cleared for d/c home without services on 9/2  Lyft transport was arranged fro the pt whom will return to her residence  No further CM intervention is warranted

## 2022-09-06 ENCOUNTER — OFFICE VISIT (OUTPATIENT)
Dept: FAMILY MEDICINE CLINIC | Facility: CLINIC | Age: 62
End: 2022-09-06
Payer: COMMERCIAL

## 2022-09-06 ENCOUNTER — TRANSITIONAL CARE MANAGEMENT (OUTPATIENT)
Dept: FAMILY MEDICINE CLINIC | Facility: CLINIC | Age: 62
End: 2022-09-06

## 2022-09-06 VITALS
OXYGEN SATURATION: 95 % | TEMPERATURE: 96.5 F | BODY MASS INDEX: 33.26 KG/M2 | HEIGHT: 64 IN | DIASTOLIC BLOOD PRESSURE: 80 MMHG | WEIGHT: 194.8 LBS | HEART RATE: 84 BPM | SYSTOLIC BLOOD PRESSURE: 142 MMHG

## 2022-09-06 DIAGNOSIS — E78.2 MIXED HYPERLIPIDEMIA: ICD-10-CM

## 2022-09-06 DIAGNOSIS — F41.9 ANXIETY: Primary | ICD-10-CM

## 2022-09-06 DIAGNOSIS — J30.1 SEASONAL ALLERGIC RHINITIS DUE TO POLLEN: ICD-10-CM

## 2022-09-06 DIAGNOSIS — J45.40 MODERATE PERSISTENT ASTHMA WITHOUT COMPLICATION: ICD-10-CM

## 2022-09-06 DIAGNOSIS — I48.0 PAROXYSMAL ATRIAL FIBRILLATION (HCC): ICD-10-CM

## 2022-09-06 DIAGNOSIS — I10 ESSENTIAL HYPERTENSION: ICD-10-CM

## 2022-09-06 DIAGNOSIS — I65.22 STENOSIS OF LEFT CAROTID ARTERY: ICD-10-CM

## 2022-09-06 DIAGNOSIS — G43.001 MIGRAINE WITHOUT AURA AND WITH STATUS MIGRAINOSUS, NOT INTRACTABLE: ICD-10-CM

## 2022-09-06 DIAGNOSIS — R29.90 STROKE-LIKE SYMPTOMS: Primary | ICD-10-CM

## 2022-09-06 PROCEDURE — 99496 TRANSJ CARE MGMT HIGH F2F 7D: CPT | Performed by: FAMILY MEDICINE

## 2022-09-06 RX ORDER — BUTALBITAL, ACETAMINOPHEN AND CAFFEINE 50; 325; 40 MG/1; MG/1; MG/1
1 TABLET ORAL EVERY 6 HOURS PRN
Qty: 30 TABLET | Refills: 0 | Status: SHIPPED | OUTPATIENT
Start: 2022-09-06 | End: 2022-09-14 | Stop reason: SDUPTHER

## 2022-09-06 RX ORDER — LORAZEPAM 1 MG/1
1 TABLET ORAL EVERY 8 HOURS PRN
Qty: 90 TABLET | Refills: 0 | Status: SHIPPED | OUTPATIENT
Start: 2022-09-06 | End: 2022-10-04 | Stop reason: SDUPTHER

## 2022-09-06 NOTE — PROGRESS NOTES
TCM Call     Date and time call was made  9/6/2022 10:15 AM    Hospital care reviewed  Records reviewed    Patient was hospitialized at  [address]    Date of Admission  09/01/22    Date of discharge  09/04/22    Diagnosis  Stroke-like symptoms    Disposition  Home    Were the patients medications reviewed and updated  Yes    Current Symptoms  None      TCM Call     Post hospital issues  None    Should patient be enrolled in anticoag monitoring? No    Scheduled for follow up? Yes    Patients specialists  Neurologist    Neurologist name  Follow up with [facility] (Neurology); Please follow up with Neurology  The  will call you to set up an appointment   If you do not hear from the  within 1 week,    Did you obtain your prescribed medications  Yes    Do you need help managing your prescriptions or medications  No    Is transportation to your appointment needed  No    I have advised the patient to call PCP with any new or worsening symptoms  Yvrose Bolanos, [facility]  Friends    The type of support provided  Emotional    Do you have social support  Yes, as much as I need    Are you recieving any outpatient services  No    Are you recieving home care services  No    Are you using any community resources  No    Current waiver services  No    Have you fallen in the last 12 months  No    Interperter language line needed  No    Counseling  Patient

## 2022-09-06 NOTE — PROGRESS NOTES
Assessment/Plan:  All hospital records reviewed  All MRIs CTs echoes reviewed at this time with the patient  Patient will continue with current regimen at this time and will see vascular specialist for carotid disease on the left  Diagnoses and all orders for this visit:    Stroke-like symptoms    Migraine without aura and with status migrainosus, not intractable  -     butalbital-acetaminophen-caffeine (FIORICET,ESGIC) -40 mg per tablet; Take 1 tablet by mouth every 6 (six) hours as needed for headaches    Paroxysmal atrial fibrillation (HCC)    Essential hypertension    Stenosis of left carotid artery    Seasonal allergic rhinitis due to pollen    Moderate persistent asthma without complication    Mixed hyperlipidemia            Subjective:        Patient ID: Tiffanie Levy is a 57 y o  female  Patient is here status post hospitalization from September 1st through the 4th were stroke-like symptoms  Patient found to have 80% blockage of the left internal carotid artery  Patient did have multiple studies done and all records reviewed  Patient had CT of the head which was negative  MRI of the brain which isn't which was negative  CTA of the head and neck which showed the left carotid being 80% blocked  Patient did have an echo as well as laboratory studies  Echo showed 35% ejection fraction  Patient is on aspirin and Repatha for carotid artery disease  Numbness and tingling felt to be more migraine related  Patient is continuing on Eliquis for AFib  Patient did have paroxysmal bout in the hospital   Patient given Cardizem as needed  Patient is continuing on metoprolol  Patient will need follow-up with vascular specialist   Patient still getting headaches  Patient with increased stress and anxiety          The following portions of the patient's history were reviewed and updated as appropriate: allergies, current medications, past family history, past medical history, past social history, past surgical history and problem list       Review of Systems   Constitutional: Negative  HENT: Negative  Eyes: Negative  Respiratory: Negative  Cardiovascular: Negative  Gastrointestinal: Negative  Endocrine: Negative  Genitourinary: Negative  Musculoskeletal: Negative  Skin: Negative  Allergic/Immunologic: Negative  Neurological: Positive for headaches  Hematological: Negative  Psychiatric/Behavioral: The patient is nervous/anxious  Objective:               /80 (BP Location: Right arm, Patient Position: Sitting, Cuff Size: Adult)   Pulse 84   Temp (!) 96 5 °F (35 8 °C) (Tympanic)   Ht 5' 4" (1 626 m)   Wt 88 4 kg (194 lb 12 8 oz)   LMP  (LMP Unknown)   SpO2 95%   BMI 33 44 kg/m²          Physical Exam  Vitals and nursing note reviewed  Constitutional:       General: She is not in acute distress  Appearance: Normal appearance  She is not ill-appearing, toxic-appearing or diaphoretic  HENT:      Head: Normocephalic and atraumatic  Right Ear: Tympanic membrane, ear canal and external ear normal  There is no impacted cerumen  Left Ear: Tympanic membrane, ear canal and external ear normal  There is no impacted cerumen  Nose: Nose normal  No congestion or rhinorrhea  Mouth/Throat:      Mouth: Mucous membranes are moist       Pharynx: No oropharyngeal exudate or posterior oropharyngeal erythema  Eyes:      General: No scleral icterus  Right eye: No discharge  Left eye: No discharge  Extraocular Movements: Extraocular movements intact  Conjunctiva/sclera: Conjunctivae normal       Pupils: Pupils are equal, round, and reactive to light  Neck:      Vascular: No carotid bruit  Cardiovascular:      Rate and Rhythm: Normal rate and regular rhythm  Pulses: Normal pulses  Heart sounds: Normal heart sounds  No murmur heard  No friction rub  No gallop     Pulmonary:      Effort: Pulmonary effort is normal  No respiratory distress  Breath sounds: Normal breath sounds  No stridor  No wheezing, rhonchi or rales  Chest:      Chest wall: No tenderness  Abdominal:      General: Bowel sounds are normal  There is no distension  Tenderness: There is no abdominal tenderness  There is no guarding or rebound  Musculoskeletal:         General: No swelling, tenderness, deformity or signs of injury  Normal range of motion  Cervical back: Normal range of motion and neck supple  No rigidity  No muscular tenderness  Right lower leg: No edema  Left lower leg: No edema  Lymphadenopathy:      Cervical: No cervical adenopathy  Skin:     General: Skin is warm and dry  Capillary Refill: Capillary refill takes less than 2 seconds  Coloration: Skin is not jaundiced  Findings: No bruising, erythema, lesion or rash  Neurological:      Mental Status: She is alert and oriented to person, place, and time  Mental status is at baseline  Cranial Nerves: No cranial nerve deficit  Sensory: No sensory deficit  Motor: No weakness  Coordination: Coordination normal       Gait: Gait normal    Psychiatric:         Mood and Affect: Mood normal          Behavior: Behavior normal          Thought Content:  Thought content normal          Judgment: Judgment normal

## 2022-09-07 DIAGNOSIS — R22.9 SUBCUTANEOUS NODULE: ICD-10-CM

## 2022-09-07 RX ORDER — ONDANSETRON 4 MG/1
4 TABLET, ORALLY DISINTEGRATING ORAL EVERY 6 HOURS PRN
Qty: 60 TABLET | Refills: 0 | Status: SHIPPED | OUTPATIENT
Start: 2022-09-07 | End: 2022-10-20 | Stop reason: SDUPTHER

## 2022-09-08 ENCOUNTER — PATIENT MESSAGE (OUTPATIENT)
Dept: FAMILY MEDICINE CLINIC | Facility: CLINIC | Age: 62
End: 2022-09-08

## 2022-09-08 DIAGNOSIS — M79.602 LEFT ARM PAIN: ICD-10-CM

## 2022-09-08 DIAGNOSIS — R30.0 DYSURIA: Primary | ICD-10-CM

## 2022-09-08 DIAGNOSIS — N30.10 INTERSTITIAL CYSTITIS: ICD-10-CM

## 2022-09-09 ENCOUNTER — TELEPHONE (OUTPATIENT)
Dept: FAMILY MEDICINE CLINIC | Facility: CLINIC | Age: 62
End: 2022-09-09

## 2022-09-09 DIAGNOSIS — M79.602 LEFT ARM PAIN: ICD-10-CM

## 2022-09-09 RX ORDER — PHENAZOPYRIDINE HYDROCHLORIDE 200 MG/1
200 TABLET, FILM COATED ORAL
Qty: 10 TABLET | Refills: 0 | Status: SHIPPED | OUTPATIENT
Start: 2022-09-09 | End: 2022-09-14 | Stop reason: SDUPTHER

## 2022-09-09 RX ORDER — TRAMADOL HYDROCHLORIDE 50 MG/1
50 TABLET ORAL 3 TIMES DAILY PRN
Qty: 90 TABLET | Refills: 0 | Status: SHIPPED | OUTPATIENT
Start: 2022-09-09 | End: 2022-09-09 | Stop reason: SDUPTHER

## 2022-09-09 RX ORDER — TRAMADOL HYDROCHLORIDE 50 MG/1
50 TABLET ORAL 4 TIMES DAILY PRN
Qty: 120 TABLET | Refills: 0 | Status: SHIPPED | OUTPATIENT
Start: 2022-09-09 | End: 2022-10-06 | Stop reason: SDUPTHER

## 2022-09-09 RX ORDER — PHENAZOPYRIDINE HYDROCHLORIDE 200 MG/1
200 TABLET, FILM COATED ORAL
Qty: 10 TABLET | Refills: 0 | Status: SHIPPED | OUTPATIENT
Start: 2022-09-09 | End: 2022-09-09 | Stop reason: SDUPTHER

## 2022-09-09 NOTE — TELEPHONE ENCOUNTER
I called [facility] to let them know it was ok for them to fill her Rx for Tramadol   They need a new Rx with different directions so that it reflects that she does take more then tid

## 2022-09-11 ENCOUNTER — NURSE TRIAGE (OUTPATIENT)
Dept: OTHER | Facility: OTHER | Age: 62
End: 2022-09-11

## 2022-09-11 NOTE — TELEPHONE ENCOUNTER
Reason for Disposition   All other patients with painful urination (Exception: [1] EITHER frequency or urgency AND [2] has on-call doctor)    Answer Assessment - Initial Assessment Questions  1  SEVERITY: "How bad is the pain?"  (e g , Scale 1-10; mild, moderate, or severe)    - MILD (1-3): complains slightly about urination hurting    - MODERATE (4-7): interferes with normal activities      - SEVERE (8-10): excruciating, unwilling or unable to urinate because of the pain       Mild pain    2  FREQUENCY: "How many times have you had painful urination today?"   3 times    3  PATTERN: "Is pain present every time you urinate or just sometimes?"     Every time    4  ONSET: "When did the painful urination start?"      9/10    5  FEVER: "Do you have a fever?" If Yes, ask: "What is your temperature, how was it measured, and when did it start?"      No    6  PAST UTI: "Have you had a urine infection before?" If Yes, ask: "When was the last time?" and "What happened that time?"   Yes given abx last time    7  CAUSE: "What do you think is causing the painful urination?"  (e g , UTI, scratch, Herpes sore)      UTI    8  OTHER SYMPTOMS: "Do you have any other symptoms?" (e g , flank pain, vaginal discharge, genital sores, urgency, blood in urine)     Pink glob and small stones, nausea    9   PREGNANCY: "Is there any chance you are pregnant?" "When was your last menstrual period?"    No    Protocols used: URINATION PAIN [facility]

## 2022-09-11 NOTE — TELEPHONE ENCOUNTER
Regarding: pain when urinating   ----- Message from Bella Nunez sent at 9/11/2022 11:47 AM EDT -----  "Delmus Bull I was experiencing trouble urinating  A blob came out that was pink in color the size of a finger nail   I am still experiencing pain when urinating "

## 2022-09-12 DIAGNOSIS — N30.10 INTERSTITIAL CYSTITIS: Primary | ICD-10-CM

## 2022-09-13 ENCOUNTER — APPOINTMENT (OUTPATIENT)
Dept: LAB | Facility: HOSPITAL | Age: 62
End: 2022-09-13
Payer: COMMERCIAL

## 2022-09-13 ENCOUNTER — TELEPHONE (OUTPATIENT)
Dept: NEUROLOGY | Facility: CLINIC | Age: 62
End: 2022-09-13

## 2022-09-13 LAB
BACTERIA UR QL AUTO: ABNORMAL /HPF
BILIRUB UR QL STRIP: NEGATIVE
CLARITY UR: ABNORMAL
COLOR UR: ABNORMAL
GLUCOSE UR STRIP-MCNC: ABNORMAL MG/DL
HGB UR QL STRIP.AUTO: ABNORMAL
KETONES UR STRIP-MCNC: ABNORMAL MG/DL
LEUKOCYTE ESTERASE UR QL STRIP: ABNORMAL
NITRITE UR QL STRIP: POSITIVE
NON-SQ EPI CELLS URNS QL MICRO: ABNORMAL /HPF
PH UR STRIP.AUTO: 5.5 [PH]
PROT UR STRIP-MCNC: ABNORMAL MG/DL
RBC #/AREA URNS AUTO: ABNORMAL /HPF
SP GR UR STRIP.AUTO: 1.02 (ref 1–1.03)
UROBILINOGEN UR QL STRIP.AUTO: 4 E.U./DL
WBC #/AREA URNS AUTO: ABNORMAL /HPF

## 2022-09-13 PROCEDURE — 87086 URINE CULTURE/COLONY COUNT: CPT

## 2022-09-13 PROCEDURE — 81001 URINALYSIS AUTO W/SCOPE: CPT | Performed by: FAMILY MEDICINE

## 2022-09-13 PROCEDURE — 87077 CULTURE AEROBIC IDENTIFY: CPT | Performed by: FAMILY MEDICINE

## 2022-09-13 PROCEDURE — 87186 SC STD MICRODIL/AGAR DIL: CPT | Performed by: FAMILY MEDICINE

## 2022-09-13 PROCEDURE — 87186 SC STD MICRODIL/AGAR DIL: CPT

## 2022-09-13 PROCEDURE — 87077 CULTURE AEROBIC IDENTIFY: CPT

## 2022-09-13 PROCEDURE — 87086 URINE CULTURE/COLONY COUNT: CPT | Performed by: FAMILY MEDICINE

## 2022-09-13 NOTE — TELEPHONE ENCOUNTER
SLB/STROKE LIKE SX, HA          NOTES:   Marcia Gilbert will need follow up in in 6 weeks with headache attending/AP  She will not require outpatient neurological testing  SCHEDULED: 11/10/22 @ 10AM W/DR [facility] IN [place]  FULL OFFICE ADDRESS PROVIDED  PATIENT OK W/ALL INFORMATION

## 2022-09-14 DIAGNOSIS — G43.001 MIGRAINE WITHOUT AURA AND WITH STATUS MIGRAINOSUS, NOT INTRACTABLE: ICD-10-CM

## 2022-09-14 DIAGNOSIS — N30.10 INTERSTITIAL CYSTITIS: ICD-10-CM

## 2022-09-14 RX ORDER — BUTALBITAL, ACETAMINOPHEN AND CAFFEINE 50; 325; 40 MG/1; MG/1; MG/1
1 TABLET ORAL EVERY 6 HOURS PRN
Qty: 30 TABLET | Refills: 0 | Status: SHIPPED | OUTPATIENT
Start: 2022-09-14 | End: 2022-09-21 | Stop reason: SDUPTHER

## 2022-09-14 RX ORDER — PHENAZOPYRIDINE HYDROCHLORIDE 200 MG/1
200 TABLET, FILM COATED ORAL
Qty: 10 TABLET | Refills: 0 | Status: SHIPPED | OUTPATIENT
Start: 2022-09-14 | End: 2022-09-19 | Stop reason: SDUPTHER

## 2022-09-15 ENCOUNTER — TELEPHONE (OUTPATIENT)
Dept: UROLOGY | Facility: CLINIC | Age: 62
End: 2022-09-15

## 2022-09-15 LAB — BACTERIA UR CULT: ABNORMAL

## 2022-09-15 NOTE — TELEPHONE ENCOUNTER
Called and spoke with patient  Patient scheduled 11/18/22 at 9:30am with Dr Kym Solares in the [facility] office  Patient placed on wait list for sooner appointment

## 2022-09-15 NOTE — TELEPHONE ENCOUNTER
Received referral from patient's PCP to schedule office visit for IC  Please ask Dr Ana Rubio if patient could be schedule with him  Dr Ana Rubio sees IC patients    Thanks

## 2022-09-16 ENCOUNTER — TELEPHONE (OUTPATIENT)
Dept: FAMILY MEDICINE CLINIC | Facility: CLINIC | Age: 62
End: 2022-09-16

## 2022-09-16 DIAGNOSIS — N39.0 URINARY TRACT INFECTION WITHOUT HEMATURIA, SITE UNSPECIFIED: Primary | ICD-10-CM

## 2022-09-16 RX ORDER — CEPHALEXIN 500 MG/1
500 CAPSULE ORAL EVERY 6 HOURS SCHEDULED
Qty: 56 CAPSULE | Refills: 0 | Status: CANCELLED | OUTPATIENT
Start: 2022-09-16 | End: 2022-09-30

## 2022-09-16 RX ORDER — CEFPODOXIME PROXETIL 200 MG/1
200 TABLET, FILM COATED ORAL 2 TIMES DAILY
Qty: 14 TABLET | Refills: 0 | Status: SHIPPED | OUTPATIENT
Start: 2022-09-16 | End: 2022-09-23

## 2022-09-16 NOTE — TELEPHONE ENCOUNTER
This pt is having severe symptoms burning with ua ,  ua frequency , having back pain , nausea , headaches

## 2022-09-21 ENCOUNTER — APPOINTMENT (EMERGENCY)
Dept: RADIOLOGY | Facility: HOSPITAL | Age: 62
End: 2022-09-21
Payer: COMMERCIAL

## 2022-09-21 ENCOUNTER — HOSPITAL ENCOUNTER (EMERGENCY)
Facility: HOSPITAL | Age: 62
Discharge: HOME | End: 2022-09-21
Attending: EMERGENCY MEDICINE
Payer: COMMERCIAL

## 2022-09-21 ENCOUNTER — OFFICE VISIT (OUTPATIENT)
Dept: FAMILY MEDICINE CLINIC | Facility: CLINIC | Age: 62
End: 2022-09-21
Payer: COMMERCIAL

## 2022-09-21 VITALS
HEART RATE: 52 BPM | SYSTOLIC BLOOD PRESSURE: 110 MMHG | HEIGHT: 64 IN | WEIGHT: 200.4 LBS | TEMPERATURE: 97.6 F | OXYGEN SATURATION: 98 % | DIASTOLIC BLOOD PRESSURE: 72 MMHG | BODY MASS INDEX: 34.21 KG/M2

## 2022-09-21 VITALS
BODY MASS INDEX: 33.99 KG/M2 | WEIGHT: 198 LBS | SYSTOLIC BLOOD PRESSURE: 142 MMHG | OXYGEN SATURATION: 97 % | TEMPERATURE: 97.9 F | HEART RATE: 54 BPM | RESPIRATION RATE: 18 BRPM | DIASTOLIC BLOOD PRESSURE: 63 MMHG

## 2022-09-21 DIAGNOSIS — G43.001 MIGRAINE WITHOUT AURA AND WITH STATUS MIGRAINOSUS, NOT INTRACTABLE: ICD-10-CM

## 2022-09-21 DIAGNOSIS — N39.0 UTI (URINARY TRACT INFECTION): ICD-10-CM

## 2022-09-21 DIAGNOSIS — R33.9 URINARY RETENTION: Primary | ICD-10-CM

## 2022-09-21 DIAGNOSIS — R34 OLIGOURIA: Primary | ICD-10-CM

## 2022-09-21 DIAGNOSIS — N12 PYELONEPHRITIS: ICD-10-CM

## 2022-09-21 DIAGNOSIS — N39.0 RECURRENT UTI: ICD-10-CM

## 2022-09-21 LAB
ALBUMIN SERPL BCP-MCNC: 3.9 G/DL (ref 3.5–5)
ALP SERPL-CCNC: 115 U/L (ref 46–116)
ALT SERPL W P-5'-P-CCNC: 28 U/L (ref 12–78)
ANION GAP SERPL CALCULATED.3IONS-SCNC: 5 MMOL/L (ref 4–13)
AST SERPL W P-5'-P-CCNC: 20 U/L (ref 5–45)
BACTERIA UR QL AUTO: ABNORMAL /HPF
BASOPHILS # BLD AUTO: 0.05 THOUSANDS/ΜL (ref 0–0.1)
BASOPHILS NFR BLD AUTO: 1 % (ref 0–1)
BILIRUB SERPL-MCNC: 0.55 MG/DL (ref 0.2–1)
BILIRUB UR QL STRIP: ABNORMAL
BUN SERPL-MCNC: 14 MG/DL (ref 5–25)
CALCIUM SERPL-MCNC: 9.3 MG/DL (ref 8.3–10.1)
CHLORIDE SERPL-SCNC: 102 MMOL/L (ref 96–108)
CLARITY UR: CLEAR
CO2 SERPL-SCNC: 29 MMOL/L (ref 21–32)
COLOR UR: ABNORMAL
CREAT SERPL-MCNC: 0.89 MG/DL (ref 0.6–1.3)
EOSINOPHIL # BLD AUTO: 0.14 THOUSAND/ΜL (ref 0–0.61)
EOSINOPHIL NFR BLD AUTO: 2 % (ref 0–6)
ERYTHROCYTE [DISTWIDTH] IN BLOOD BY AUTOMATED COUNT: 12.9 % (ref 11.6–15.1)
GFR SERPL CREATININE-BSD FRML MDRD: 68 ML/MIN/1.73SQ M
GLUCOSE SERPL-MCNC: 94 MG/DL (ref 65–140)
GLUCOSE UR STRIP-MCNC: NEGATIVE MG/DL
HCT VFR BLD AUTO: 38.7 % (ref 34.8–46.1)
HGB BLD-MCNC: 12.7 G/DL (ref 11.5–15.4)
HGB UR QL STRIP.AUTO: NEGATIVE
IMM GRANULOCYTES # BLD AUTO: 0.03 THOUSAND/UL (ref 0–0.2)
IMM GRANULOCYTES NFR BLD AUTO: 0 % (ref 0–2)
KETONES UR STRIP-MCNC: NEGATIVE MG/DL
LEUKOCYTE ESTERASE UR QL STRIP: ABNORMAL
LIPASE SERPL-CCNC: 84 U/L (ref 73–393)
LYMPHOCYTES # BLD AUTO: 2.06 THOUSANDS/ΜL (ref 0.6–4.47)
LYMPHOCYTES NFR BLD AUTO: 29 % (ref 14–44)
MCH RBC QN AUTO: 30 PG (ref 26.8–34.3)
MCHC RBC AUTO-ENTMCNC: 32.8 G/DL (ref 31.4–37.4)
MCV RBC AUTO: 92 FL (ref 82–98)
MONOCYTES # BLD AUTO: 0.49 THOUSAND/ΜL (ref 0.17–1.22)
MONOCYTES NFR BLD AUTO: 7 % (ref 4–12)
NEUTROPHILS # BLD AUTO: 4.36 THOUSANDS/ΜL (ref 1.85–7.62)
NEUTS SEG NFR BLD AUTO: 61 % (ref 43–75)
NITRITE UR QL STRIP: POSITIVE
NON-SQ EPI CELLS URNS QL MICRO: ABNORMAL /HPF
NRBC BLD AUTO-RTO: 0 /100 WBCS
PH UR STRIP.AUTO: 6.5 [PH]
PLATELET # BLD AUTO: 219 THOUSANDS/UL (ref 149–390)
PMV BLD AUTO: 9 FL (ref 8.9–12.7)
POTASSIUM SERPL-SCNC: 3.5 MMOL/L (ref 3.5–5.3)
PROT SERPL-MCNC: 7.5 G/DL (ref 6.4–8.4)
PROT UR STRIP-MCNC: NEGATIVE MG/DL
RBC # BLD AUTO: 4.23 MILLION/UL (ref 3.81–5.12)
RBC #/AREA URNS AUTO: ABNORMAL /HPF
SODIUM SERPL-SCNC: 136 MMOL/L (ref 135–147)
SP GR UR STRIP.AUTO: 1.01 (ref 1–1.03)
UROBILINOGEN UR STRIP-ACNC: 2 MG/DL
WBC # BLD AUTO: 7.13 THOUSAND/UL (ref 4.31–10.16)
WBC #/AREA URNS AUTO: ABNORMAL /HPF

## 2022-09-21 PROCEDURE — 74177 CT ABD & PELVIS W/CONTRAST: CPT

## 2022-09-21 PROCEDURE — 99284 EMERGENCY DEPT VISIT MOD MDM: CPT | Performed by: EMERGENCY MEDICINE

## 2022-09-21 PROCEDURE — 99284 EMERGENCY DEPT VISIT MOD MDM: CPT

## 2022-09-21 PROCEDURE — 96375 TX/PRO/DX INJ NEW DRUG ADDON: CPT

## 2022-09-21 PROCEDURE — 83690 ASSAY OF LIPASE: CPT | Performed by: EMERGENCY MEDICINE

## 2022-09-21 PROCEDURE — 51798 US URINE CAPACITY MEASURE: CPT

## 2022-09-21 PROCEDURE — 36415 COLL VENOUS BLD VENIPUNCTURE: CPT | Performed by: EMERGENCY MEDICINE

## 2022-09-21 PROCEDURE — 80053 COMPREHEN METABOLIC PANEL: CPT | Performed by: EMERGENCY MEDICINE

## 2022-09-21 PROCEDURE — 96365 THER/PROPH/DIAG IV INF INIT: CPT

## 2022-09-21 PROCEDURE — 96366 THER/PROPH/DIAG IV INF ADDON: CPT

## 2022-09-21 PROCEDURE — G1004 CDSM NDSC: HCPCS

## 2022-09-21 PROCEDURE — 99214 OFFICE O/P EST MOD 30 MIN: CPT | Performed by: FAMILY MEDICINE

## 2022-09-21 PROCEDURE — 85025 COMPLETE CBC W/AUTO DIFF WBC: CPT | Performed by: EMERGENCY MEDICINE

## 2022-09-21 PROCEDURE — 81001 URINALYSIS AUTO W/SCOPE: CPT | Performed by: EMERGENCY MEDICINE

## 2022-09-21 RX ORDER — CEPHALEXIN 500 MG/1
500 CAPSULE ORAL EVERY 6 HOURS SCHEDULED
Qty: 12 CAPSULE | Refills: 0 | Status: SHIPPED | OUTPATIENT
Start: 2022-09-21 | End: 2022-09-24

## 2022-09-21 RX ORDER — CHLORDIAZEPOXIDE HYDROCHLORIDE AND CLIDINIUM BROMIDE 5; 2.5 MG/1; MG/1
1 CAPSULE ORAL
Qty: 60 CAPSULE | Refills: 0 | Status: SHIPPED | OUTPATIENT
Start: 2022-09-21

## 2022-09-21 RX ORDER — KETOROLAC TROMETHAMINE 30 MG/ML
15 INJECTION, SOLUTION INTRAMUSCULAR; INTRAVENOUS ONCE
Status: COMPLETED | OUTPATIENT
Start: 2022-09-21 | End: 2022-09-21

## 2022-09-21 RX ORDER — BUTALBITAL, ACETAMINOPHEN AND CAFFEINE 50; 325; 40 MG/1; MG/1; MG/1
1 TABLET ORAL EVERY 6 HOURS PRN
Qty: 30 TABLET | Refills: 0 | Status: SHIPPED | OUTPATIENT
Start: 2022-09-21 | End: 2022-10-05 | Stop reason: SDUPTHER

## 2022-09-21 RX ADMIN — KETOROLAC TROMETHAMINE 15 MG: 30 INJECTION, SOLUTION INTRAMUSCULAR at 15:06

## 2022-09-21 RX ADMIN — CEFTRIAXONE 1000 MG: 10 INJECTION, POWDER, FOR SOLUTION INTRAVENOUS at 15:07

## 2022-09-21 RX ADMIN — IOHEXOL 100 ML: 350 INJECTION, SOLUTION INTRAVENOUS at 16:14

## 2022-09-21 NOTE — ED PROVIDER NOTES
History  Chief Complaint   Patient presents with    Urinary Retention     Per patient, "I have been being treated for a UTI for months now and now I am barely urinating" reports urinating a small amount before coming to ED  HPI  Patient is a 18-year-old female with history of AFib on Eliquis, recurrent UTIs, SMA thrombosis status post stent presenting with difficulty urinating as well as dysuria  Patient was recently evaluated for symptoms of UTI and urinalysis was performed 8 days ago that confirmed a UTI  Primary care physician provided cefpodoxime which patient is currently taking  States that despite taking the antibiotics her symptoms worsen  Has lower abdominal pain as well as bilateral flank plain  Patient has been experiencing chills as well as nausea and which she reach out to her primary care provider she was instructed to come to the emergency department for assessment to confirm pyelonephritis  Prior to Admission Medications   Prescriptions Last Dose Informant Patient Reported? Taking?    Cholecalciferol 25 MCG (1000 UT) tablet  Self Yes No   Sig: Take 1,000 Units by mouth daily   ELDERBERRY PO   Yes No   Sig: Take by mouth daily   Evolocumab 140 MG/ML SOAJ  Self No No   Sig: Inject 1 mL (140 mg total) under the skin every 14 (fourteen) days   Ginger, Zingiber officinalis, (GINGER PO)  Self Yes No   Sig: Take 1 tablet by mouth in the morning   Heparin Sodium, Porcine, (heparin, porcine,) 03347 UNIT/ML  Self Yes No   LORazepam (ATIVAN) 0 5 mg tablet   No No   Sig: Take 1 tablet (0 5 mg total) by mouth every 8 (eight) hours as needed for anxiety   LORazepam (ATIVAN) 1 mg tablet   No No   Sig: Take 1 tablet (1 mg total) by mouth every 8 (eight) hours as needed for anxiety   MULTIPLE VITAMIN PO  Self Yes No   Sig: Take by mouth   Meth-Hyo-M Bl-Na Phos-Ph Sal (Uribel) 118 MG CAPS  Self Yes No   Sig: Take 1 each by mouth Three times a day   Patient not taking: Reported on 9/6/2022   Probiotic Product (PROBIOTIC-10 PO)  Self Yes No   Sig: Take 1 tablet by mouth in the morning   Turmeric 500 MG CAPS  Self Yes No   Zinc 100 MG TABS  Self Yes No   Sig: Take by mouth daily   acetaminophen (TYLENOL) 325 mg tablet   Yes No   Sig: Take 650 mg by mouth as needed for mild pain   apixaban (Eliquis) 5 mg  Self No No   Sig: Take 1 tablet (5 mg total) by mouth 2 (two) times a day   aspirin 81 mg chewable tablet   No No   Sig: Chew 1 tablet (81 mg total) daily   azelastine (OPTIVAR) 0 05 % ophthalmic solution  Self No No   Sig: Administer 1 drop to both eyes 2 (two) times a day   azelastine (OPTIVAR) 0 05 % ophthalmic solution   No No   Sig: Administer 1 drop to both eyes 2 (two) times a day   butalbital-acetaminophen-caffeine (FIORICET,ESGIC) -40 mg per tablet   No No   Sig: Take 1 tablet by mouth every 6 (six) hours as needed for headaches   cefpodoxime (VANTIN) 200 mg tablet   No No   Sig: Take 1 tablet (200 mg total) by mouth 2 (two) times a day for 7 days   cetirizine (ZyrTEC) 10 mg tablet  Self No No   Sig: Take 1 tablet (10 mg total) by mouth daily for 365 doses   chlordiazepoxide-clidinium (LIBRAX) 5-2 5 mg per capsule   No No   Sig: Take 1 capsule by mouth 4 (four) times a day (before meals and at bedtime)   clotrimazole-betamethasone (LOTRISONE) 1-0 05 % cream  Self Yes No   dexlansoprazole (DEXILANT) 60 MG capsule  Self No No   Sig: Take 1 capsule (60 mg total) by mouth daily Please take on an empty stomach 30 minutes before eating   diltiazem (CARDIZEM) 120 MG tablet   Yes No   Sig: Take 120 mg by mouth if needed (For heart palpitations)   fluticasone-vilanterol (BREO ELLIPTA) 200-25 MCG/INH inhaler  Self No No   Sig: Inhale 1 puff daily Rinse mouth after use     furosemide (LASIX) 40 mg tablet  Self No No   Sig: Take 1 tablet (40 mg total) by mouth daily   hydrocortisone-pramoxine (PROCTOFOAM-HC) 1-1 % FOAM rectal foam  Self No No   Sig: Insert 1 applicator into the rectum 2 (two) times a day ipratropium (ATROVENT) 0 06 % nasal spray  Self Yes No   ketoconazole (NIZORAL) 2 % cream  Self No No   Sig: Apply topically daily   linaCLOtide (Linzess) 145 MCG CAPS   No No   Sig: Take 1 capsule (145 mcg total) by mouth in the morning   Patient not taking: No sig reported   metoprolol succinate (TOPROL-XL) 100 mg 24 hr tablet  Self No No   Sig: Take 1 tablet (100 mg total) by mouth 2 (two) times a day   montelukast (SINGULAIR) 10 mg tablet   No No   Sig: Take 1 tablet (10 mg total) by mouth daily at bedtime   naloxone (NARCAN) 4 mg/0 1 mL nasal spray  Self No No   Sig: Administer 1 spray into a nostril  If no response after 2-3 minutes, give another dose in the other nostril using a new spray     Patient not taking: Reported on 9/6/2022   nystatin (MYCOSTATIN) powder  Self No No   Sig: Apply topically three times a weekly to affected area   omeprazole (PriLOSEC) 40 MG capsule  Self No No   Sig: Take 1 capsule (40 mg total) by mouth daily   ondansetron (Zofran ODT) 4 mg disintegrating tablet   No No   Sig: Take 1 tablet (4 mg total) by mouth every 6 (six) hours as needed for nausea or vomiting   phenazopyridine (PYRIDIUM) 200 mg tablet   No No   Sig: Take 1 tablet (200 mg total) by mouth 3 (three) times a day with meals   spironolactone (ALDACTONE) 25 mg tablet   No No   Sig: TAKE ONE TABLET BY MOUTH TWICE DAILY   sucralfate (CARAFATE) 1 g tablet  Self No No   Sig: TAKE ONE TABLET BY MOUTH FOUR TIMES DAILY   traMADol (ULTRAM) 50 mg tablet   No No   Sig: Take 1 tablet (50 mg total) by mouth 4 (four) times a day as needed for severe pain   triamcinolone acetonide (KENALOG-40) 40 mg/mL  Self Yes No      Facility-Administered Medications Last Administration Doses Remaining   cyanocobalamin injection 1,000 mcg 8/24/2022  2:12 PM           Past Medical History:   Diagnosis Date    A-fib ([facility] ) 03/2020    Allergic     Anxiety     Arthritis     Asthma     Colon polyp     Depression     GERD (gastroesophageal reflux disease)     Heart murmur     Hives     Hyperlipidemia     Hypertension     Infertility, female     Migraine     Palpitations     Psychiatric disorder     Wears glasses        Past Surgical History:   Procedure Laterality Date    COLONOSCOPY      EGD      EXPLORATORY LAPAROTOMY      for infertility    HAND SURGERY      Hand excision of tendon cyst    UT LAP,APPENDECTOMY N/A 5/3/2022    Procedure: APPENDECTOMY LAPAROSCOPIC;  Surgeon: Wilson Dorsey MD;  [facility]: [facility];  Service: General    SUPERIOR MESTENTERIC ARTERY STENT      WISDOM TOOTH EXTRACTION         Family History   Problem Relation Age of Onset    Lung cancer Mother 61    Brain cancer Mother 61    Diabetes Father     Depression Father     Other Father         septic    Allergies Sister     Hashimoto's thyroiditis Sister     Abdominal aortic aneurysm Sister     Diabetes Sister     No Known Problems Sister     No Known Problems Maternal Grandmother     No Known Problems Maternal Grandfather     No Known Problems Paternal Grandmother     No Known Problems Paternal Grandfather     Hodgkin's lymphoma Brother     No Known Problems Brother     No Known Problems Maternal Aunt     Diabetes Other      I have reviewed and agree with the history as documented  E-Cigarette/Vaping    E-Cigarette Use Never User      E-Cigarette/Vaping Substances    Nicotine No     THC No     CBD No     Flavoring No     Other No     Unknown No      Social History     Tobacco Use    Smoking status: Former Smoker     Packs/day: 0 50     Years: 10 00     Pack years: 5 00     Quit date: 2019     Years since quitting: 3 7    Smokeless tobacco: Never Used   Vaping Use    Vaping Use: Never used   Substance Use Topics    Alcohol use: Never    Drug use: No        Review of Systems   Constitutional: Positive for chills  Negative for diaphoresis, fever and unexpected weight change  HENT: Negative for ear pain and sore throat      Eyes: Negative for visual disturbance  Respiratory: Negative for cough, chest tightness and shortness of breath  Cardiovascular: Negative for chest pain and leg swelling  Gastrointestinal: Positive for abdominal pain and nausea  Negative for abdominal distention, constipation, diarrhea and vomiting  Endocrine: Negative  Genitourinary: Positive for flank pain  Negative for difficulty urinating and dysuria  Skin: Negative  Allergic/Immunologic: Negative  Neurological: Negative  Hematological: Negative  Psychiatric/Behavioral: Negative  All other systems reviewed and are negative  Physical Exam  ED Triage Vitals [09/21/22 1149]   Temperature Pulse Respirations Blood Pressure SpO2   97 9 °F (36 6 °C) 60 18 137/81 95 %      Temp Source Heart Rate Source Patient Position - Orthostatic VS BP Location FiO2 (%)   Tympanic Monitor Sitting Left arm --      Pain Score       --             Orthostatic Vital Signs  Vitals:    09/21/22 1149 09/21/22 1511 09/21/22 1703   BP: 137/81  142/63   Pulse: 60 67 (!) 54   Patient Position - Orthostatic VS: Sitting  Lying       Physical Exam  Vitals and nursing note reviewed  Constitutional:       General: She is not in acute distress  Appearance: Normal appearance  She is not ill-appearing  HENT:      Head: Normocephalic and atraumatic  Right Ear: External ear normal       Left Ear: External ear normal       Nose: Nose normal       Mouth/Throat:      Mouth: Mucous membranes are moist       Pharynx: Oropharynx is clear  Eyes:      General: No scleral icterus  Right eye: No discharge  Left eye: No discharge  Extraocular Movements: Extraocular movements intact  Conjunctiva/sclera: Conjunctivae normal       Pupils: Pupils are equal, round, and reactive to light  Cardiovascular:      Rate and Rhythm: Normal rate and regular rhythm  Pulses: Normal pulses  Heart sounds: Normal heart sounds     Pulmonary:      Effort: Pulmonary effort is normal       Breath sounds: Normal breath sounds  Abdominal:      General: Abdomen is flat  Bowel sounds are normal  There is no distension  Palpations: Abdomen is soft  Tenderness: There is abdominal tenderness  There is right CVA tenderness and left CVA tenderness  There is no guarding or rebound  Musculoskeletal:         General: Normal range of motion  Cervical back: Normal range of motion and neck supple  Skin:     General: Skin is warm and dry  Capillary Refill: Capillary refill takes less than 2 seconds  Neurological:      General: No focal deficit present  Mental Status: She is alert and oriented to person, place, and time  Mental status is at baseline  Psychiatric:         Mood and Affect: Mood normal          Behavior: Behavior normal          Thought Content:  Thought content normal          Judgment: Judgment normal          ED Medications  Medications   ceftriaxone (ROCEPHIN) 1 g/50 mL in dextrose IVPB (0 mg Intravenous Stopped 9/21/22 1717)   ketorolac (TORADOL) injection 15 mg (15 mg Intravenous Given 9/21/22 1506)   iohexol (OMNIPAQUE) 350 MG/ML injection (SINGLE-DOSE) 100 mL (100 mL Intravenous Given 9/21/22 1614)       Diagnostic Studies  Results Reviewed     Procedure Component Value Units Date/Time    Comprehensive metabolic panel [[id number]] Collected: 09/21/22 1329    Lab Status: Final result Specimen: Blood from Arm, Right Updated: 09/21/22 1410     Sodium 136 mmol/L      Potassium 3 5 mmol/L      Chloride 102 mmol/L      CO2 29 mmol/L      ANION GAP 5 mmol/L      BUN 14 mg/dL      Creatinine 0 89 mg/dL      Glucose 94 mg/dL      Calcium 9 3 mg/dL      AST 20 U/L      ALT 28 U/L      Alkaline Phosphatase 115 U/L      Total Protein 7 5 g/dL      Albumin 3 9 g/dL      Total Bilirubin 0 55 mg/dL      eGFR 68 ml/min/1 73sq m     Narrative:      [place] guidelines for Chronic Kidney Disease (CKD):     Stage 1 with normal or high GFR (GFR > 90 mL/min/1 73 square meters)    Stage 2 Mild CKD (GFR = 60-89 mL/min/1 73 square meters)    Stage 3A Moderate CKD (GFR = 45-59 mL/min/1 73 square meters)    Stage 3B Moderate CKD (GFR = 30-44 mL/min/1 73 square meters)    Stage 4 Severe CKD (GFR = 15-29 mL/min/1 73 square meters)    Stage 5 End Stage CKD (GFR <15 mL/min/1 73 square meters)  Note: GFR calculation is accurate only with a steady state creatinine    Lipase [[id number]]  (Normal) Collected: 09/21/22 1329    Lab Status: Final result Specimen: Blood from Arm, Right Updated: 09/21/22 1408     Lipase 84 u/L     Urine Microscopic [[id number]]  (Abnormal) Collected: 09/21/22 1329    Lab Status: Final result Specimen: Urine, Clean Catch Updated: 09/21/22 1354     RBC, UA None Seen /hpf      WBC, UA 2-4 /hpf      Epithelial Cells Occasional /hpf      Bacteria, UA None Seen /hpf     UA w Reflex to Microscopic w Reflex to Culture [[id number]]  (Abnormal) Collected: 09/21/22 1329    Lab Status: Final result Specimen: Urine, Clean Catch Updated: 09/21/22 1352     Color, UA Dark Yellow     Clarity, UA Clear     Specific Gravity, UA 1 011     pH, UA 6 5     Leukocytes, UA Small     Nitrite, UA Positive     Protein, UA Negative mg/dl      Glucose, UA Negative mg/dl      Ketones, UA Negative mg/dl      Urobilinogen, UA 2 0 mg/dl      Bilirubin, UA Small     Occult Blood, UA Negative    CBC and differential [[id number]] Collected: 09/21/22 1329    Lab Status: Final result Specimen: Blood from Arm, Right Updated: 09/21/22 1342     WBC 7 13 Thousand/uL      RBC 4 23 Million/uL      Hemoglobin 12 7 g/dL      Hematocrit 38 7 %      MCV 92 fL      MCH 30 0 pg      MCHC 32 8 g/dL      RDW 12 9 %      MPV 9 0 fL      Platelets 096 Thousands/uL      nRBC 0 /100 WBCs      Neutrophils Relative 61 %      Immat GRANS % 0 %      Lymphocytes Relative 29 %      Monocytes Relative 7 %      Eosinophils Relative 2 %      Basophils Relative 1 %      Neutrophils Absolute 4 36 Thousands/µL      Immature Grans Absolute 0 03 Thousand/uL      Lymphocytes Absolute 2 06 Thousands/µL      Monocytes Absolute 0 49 Thousand/µL      Eosinophils Absolute 0 14 Thousand/µL      Basophils Absolute 0 05 Thousands/µL                  CT abdomen pelvis with contrast   Final Result by Katharine Slade MD (09/21 1627)      No acute inflammatory changes in the abdomen or pelvis  Workstation performed: [id number]               Procedures  Procedures      ED Course                                       MDM  Number of Diagnoses or Management Options  Urinary retention  UTI (urinary tract infection)  Diagnosis management comments:    Patient is a 47-year-old female presenting with flank pain as well as lower abdominal pain setting of urinary retention  Bladder scan performed with 400 cc urine after voiding  Despite Fenton catheter in place patient still complains of abdominal pain  Abdominal labs as well as abdominal CT obtained  Urinalysis confirms UTI  CT with no acute findings  Rocephin given while waiting for CT results  Patient discharge with Keflex prescription for additional 3 days on top of her existing cefpodoxime  When asked if patient would like to continue her Fenton catheter until being seen by urologist patient refused    Spoke to her about the possibility of urinary retention occurring again have repeat patient was adamant about not keeping the Fenton catheter  Patient instructed to follow-up with urology as well as return precautions given    Disposition  Final diagnoses:   Urinary retention   UTI (urinary tract infection)     Time reflects when diagnosis was documented in both MDM as applicable and the Disposition within this note     Time User Action Codes Description Comment    9/21/2022  4:43 PM Kofi Holbrook [R33 9] Urinary retention     9/21/2022  4:43 PM Kofi Mota Add [N39 0] UTI (urinary tract infection)       ED Disposition     ED Disposition   Discharge Condition   Stable    Date/Time   Wed Sep 21, 2022  4:43 PM    Comment   Malcolm Ace discharge to home/self care                 Follow-up Information     Follow up With Specialties Details Why Contact Info Additional [address] Urology [facility] Schedule an appointment as soon as possible for a visit   [address] [phone]  [address]  [facility], [address], [address],  [facility], [address] [facility] [place], [facility] Emergency Department    [address] [facility] Emergency Medicine Go to  If symptoms worsen [address] [id number]  [address] [facility] 64 [facility], [address] [place], [place], [address]          Discharge Medication List as of 9/21/2022  4:44 PM      START taking these medications    Details   butalbital-acetaminophen-caffeine (FIORICET,ESGIC) -40 mg per tablet Take 1 tablet by mouth every 6 (six) hours as needed for headaches, Starting Wed 9/21/2022, Normal      cephalexin (KEFLEX) 500 mg capsule Take 1 capsule (500 mg total) by mouth every 6 (six) hours for 3 days, Starting Wed 9/21/2022, Until Sat 9/24/2022, Normal      chlordiazepoxide-clidinium (LIBRAX) 5-2 5 mg per capsule Take 1 capsule by mouth 4 (four) times a day (before meals and at bedtime), Starting Wed 9/21/2022, Normal         CONTINUE these medications which have NOT CHANGED    Details   acetaminophen (TYLENOL) 325 mg tablet Take 650 mg by mouth as needed for mild pain, Historical Med      apixaban (Eliquis) 5 mg Take 1 tablet (5 mg total) by mouth 2 (two) times a day, Starting Wed 4/13/2022, Normal      aspirin 81 mg chewable tablet Chew 1 tablet (81 mg total) daily, Starting Sun 9/4/2022, No Print      !! azelastine (OPTIVAR) 0 05 % ophthalmic solution Administer 1 drop to both eyes 2 (two) times a day, Starting Mon 4/11/2022, Normal !! azelastine (OPTIVAR) 0 05 % ophthalmic solution Administer 1 drop to both eyes 2 (two) times a day, Starting Tue 4/19/2022, Normal      cefpodoxime (VANTIN) 200 mg tablet Take 1 tablet (200 mg total) by mouth 2 (two) times a day for 7 days, Starting Fri 9/16/2022, Until Fri 9/23/2022, Normal      cetirizine (ZyrTEC) 10 mg tablet Take 1 tablet (10 mg total) by mouth daily for 365 doses, Starting Wed 1/22/2020, Until Sun 9/3/2023, Normal      Cholecalciferol 25 MCG (1000 UT) tablet Take 1,000 Units by mouth daily, Historical Med      clotrimazole-betamethasone (LOTRISONE) 1-0 05 % cream Starting Wed 3/2/2022, Historical Med      dexlansoprazole (DEXILANT) 60 MG capsule Take 1 capsule (60 mg total) by mouth daily Please take on an empty stomach 30 minutes before eating, Starting Tue 6/7/2022, Normal      diltiazem (CARDIZEM) 120 MG tablet Take 120 mg by mouth if needed (For heart palpitations), Historical Med      ELDERBERRY PO Take by mouth daily, Historical Med      Evolocumab 140 MG/ML SOAJ Inject 1 mL (140 mg total) under the skin every 14 (fourteen) days, Starting Mon 6/20/2022, Normal      fluticasone-vilanterol (BREO ELLIPTA) 200-25 MCG/INH inhaler Inhale 1 puff daily Rinse mouth after use , Starting Mon 3/1/2021, Normal      furosemide (LASIX) 40 mg tablet Take 1 tablet (40 mg total) by mouth daily, Starting Fri 12/3/2021, Until Sat 12/3/2022, Normal      Pauline, Zingiber officinalis, (PAULINE PO) Take 1 tablet by mouth in the morning, Historical Med      Heparin Sodium, Porcine, (heparin, porcine,) 42541 UNIT/ML Starting Tue 6/14/2022, Historical Med      hydrocortisone-pramoxine (PROCTOFOAM-HC) 1-1 % FOAM rectal foam Insert 1 applicator into the rectum 2 (two) times a day, Starting u 3/31/2022, Normal      ipratropium (ATROVENT) 0 06 % nasal spray Starting Mon 2/28/2022, Historical Med      ketoconazole (NIZORAL) 2 % cream Apply topically daily, Starting Fri 2/18/2022, Normal      linaCLOtide (Linzess) 145 MCG CAPS Take 1 capsule (145 mcg total) by mouth in the morning, Starting Wed 8/3/2022, Normal      LORazepam (ATIVAN) 1 mg tablet Take 1 tablet (1 mg total) by mouth every 8 (eight) hours as needed for anxiety, Starting Tue 9/6/2022, Normal      Meth-Hyo-M Bl-Na Phos-Ph Sal (Uribel) 118 MG CAPS Take 1 each by mouth Three times a day, Starting Fri 2/25/2022, Historical Med      metoprolol succinate (TOPROL-XL) 100 mg 24 hr tablet Take 1 tablet (100 mg total) by mouth 2 (two) times a day, Starting Mon 3/7/2022, Normal      montelukast (SINGULAIR) 10 mg tablet Take 1 tablet (10 mg total) by mouth daily at bedtime, Starting Wed 7/27/2022, Normal      MULTIPLE VITAMIN PO Take by mouth, Historical Med      naloxone (NARCAN) 4 mg/0 1 mL nasal spray Administer 1 spray into a nostril   If no response after 2-3 minutes, give another dose in the other nostril using a new spray , Normal      nystatin (MYCOSTATIN) powder Apply topically three times a weekly to affected area, Normal      omeprazole (PriLOSEC) 40 MG capsule Take 1 capsule (40 mg total) by mouth daily, Starting Mon 5/9/2022, Normal      ondansetron (Zofran ODT) 4 mg disintegrating tablet Take 1 tablet (4 mg total) by mouth every 6 (six) hours as needed for nausea or vomiting, Starting Wed 9/7/2022, Normal      phenazopyridine (PYRIDIUM) 200 mg tablet Take 1 tablet (200 mg total) by mouth 3 (three) times a day with meals, Starting Tue 9/20/2022, Normal      Probiotic Product (PROBIOTIC-10 PO) Take 1 tablet by mouth in the morning, Historical Med      spironolactone (ALDACTONE) 25 mg tablet TAKE ONE TABLET BY MOUTH TWICE DAILY, Normal      sucralfate (CARAFATE) 1 g tablet TAKE ONE TABLET BY MOUTH FOUR TIMES DAILY, Normal      traMADol (ULTRAM) 50 mg tablet Take 1 tablet (50 mg total) by mouth 4 (four) times a day as needed for severe pain, Starting Fri 9/9/2022, Normal      triamcinolone acetonide (KENALOG-40) 40 mg/mL Starting Wed 6/15/2022, Historical Med Turmeric 500 MG CAPS Historical Med      Zinc 100 MG TABS Take by mouth daily, Historical Med       !! - Potential duplicate medications found  Please discuss with provider  No discharge procedures on file  PDMP Review       Value Time User    PDMP Reviewed  Yes 8/18/2022 12:24 PM Steven Atkinson,            ED Provider  Attending physically available and evaluated Amie Nicholss  I managed the patient along with the ED Attending      Electronically Signed by         Kenia Ruiz MD  09/24/22 4479       Kenia Ruiz MD  09/24/22 1640

## 2022-09-21 NOTE — ED ATTENDING ATTESTATION
9/21/2022  ICass MD, saw and evaluated the patient  I have discussed the patient with the resident/non-physician practitioner and agree with the resident's/non-physician practitioner's findings, Plan of Care, and MDM as documented in the resident's/non-physician practitioner's note, except where noted  All available labs and Radiology studies were reviewed  I was present for key portions of any procedure(s) performed by the resident/non-physician practitioner and I was immediately available to provide assistance  At this point I agree with the current assessment done in the Emergency Department    I have conducted an independent evaluation of this patient a history and physical is as follows:  C/o dysuria  Small volumes   No fever    Suprapubic area    Exam  Looks well non toxic   Anicteric lungs clear heart regular abdomen soft positive bowel sounds mild discomfort in the suprapubic area without rebound or guarding  Impression urinary symptoms will check bladder scans will add urinary retention urine to rule out urinary tact infection  ED Course         Critical Care Time  Procedures

## 2022-09-21 NOTE — PROGRESS NOTES
Assessment/Plan:  Labs and records reviewed  Urine culture reviewed  Due to patient feeling multiple p o  antibiotics without resolution of urinary symptoms patient recommended to go to emergency room for possible pyelonephritis and IV antibiotics  Patient also with decreased urination output at this time  Patient will follow-up after hospitalization       Diagnoses and all orders for this visit:    Oligouria    Pyelonephritis    Recurrent UTI            Subjective:        Patient ID: Richarda Koyanagi is a 57 y o  female  Patient is here with abdominal pain on the left as well as some left flank pain urinary frequency chills nausea the with decreased ability to urinate  Patient recently had abnormal urine culture has been on multiple medications/antibiotics for UTI  The        The following portions of the patient's history were reviewed and updated as appropriate: allergies, current medications, past family history, past medical history, past social history, past surgical history and problem list       Review of Systems   Constitutional: Positive for chills  HENT: Negative  Eyes: Negative  Respiratory: Negative  Cardiovascular: Negative  Gastrointestinal: Positive for abdominal pain and nausea  Endocrine: Negative  Genitourinary: Positive for dysuria and flank pain  Negative for frequency  Difficulty urinating   Skin: Negative  Allergic/Immunologic: Negative  Neurological: Positive for headaches  Hematological: Negative  Psychiatric/Behavioral: Negative  Objective:               /72 (BP Location: Right arm, Patient Position: Sitting, Cuff Size: Standard)   Pulse (!) 52   Temp 97 6 °F (36 4 °C) (Temporal)   Ht 5' 4" (1 626 m)   Wt 90 9 kg (200 lb 6 4 oz)   LMP  (LMP Unknown)   SpO2 98%   BMI 34 40 kg/m²          Physical Exam  Vitals and nursing note reviewed  Constitutional:       General: She is not in acute distress       Appearance: She is ill-appearing  She is not toxic-appearing or diaphoretic  HENT:      Head: Normocephalic and atraumatic  Right Ear: Tympanic membrane, ear canal and external ear normal  There is no impacted cerumen  Left Ear: Tympanic membrane, ear canal and external ear normal  There is no impacted cerumen  Nose: Nose normal  No congestion or rhinorrhea  Mouth/Throat:      Mouth: Mucous membranes are moist       Pharynx: No oropharyngeal exudate or posterior oropharyngeal erythema  Eyes:      General: No scleral icterus  Right eye: No discharge  Left eye: No discharge  Extraocular Movements: Extraocular movements intact  Conjunctiva/sclera: Conjunctivae normal       Pupils: Pupils are equal, round, and reactive to light  Neck:      Vascular: No carotid bruit  Cardiovascular:      Rate and Rhythm: Normal rate and regular rhythm  Pulses: Normal pulses  Heart sounds: Normal heart sounds  No murmur heard  No friction rub  No gallop  Pulmonary:      Effort: Pulmonary effort is normal  No respiratory distress  Breath sounds: Normal breath sounds  No stridor  No wheezing, rhonchi or rales  Chest:      Chest wall: No tenderness  Abdominal:      General: Abdomen is flat  Bowel sounds are normal  There is no distension  Palpations: Abdomen is soft  Tenderness: There is abdominal tenderness  There is left CVA tenderness  There is no guarding or rebound  Musculoskeletal:         General: No swelling, deformity or signs of injury  Cervical back: Normal range of motion and neck supple  No rigidity  No muscular tenderness  Right lower leg: No edema  Left lower leg: No edema  Lymphadenopathy:      Cervical: No cervical adenopathy  Skin:     General: Skin is warm and dry  Capillary Refill: Capillary refill takes less than 2 seconds  Coloration: Skin is not jaundiced  Findings: No bruising, erythema, lesion or rash  Neurological:      Mental Status: She is alert and oriented to person, place, and time  Mental status is at baseline  Cranial Nerves: No cranial nerve deficit  Sensory: No sensory deficit  Motor: No weakness  Coordination: Coordination normal       Gait: Gait normal    Psychiatric:         Mood and Affect: Mood normal          Behavior: Behavior normal          Thought Content:  Thought content normal          Judgment: Judgment normal

## 2022-09-22 NOTE — TELEPHONE ENCOUNTER
Patient called and sated that shew as in the ER for for urinary retention and UTI symptoms  She does have an appointment until 11/18 but she wants to know  if she should be seen sooner  She is experiencing some discomfort       Patient can be reached at [phone]

## 2022-09-23 NOTE — TELEPHONE ENCOUNTER
LVM for pt on mobile number per medical communication consent  Provided this information on message  Ok to keep 11/18 appt

## 2022-09-26 ENCOUNTER — TELEPHONE (OUTPATIENT)
Dept: FAMILY MEDICINE CLINIC | Facility: CLINIC | Age: 62
End: 2022-09-26

## 2022-09-26 NOTE — TELEPHONE ENCOUNTER
Patient contacted and she cancelled this appointment for now and she will call back if she needs referral placed, patient was using c-pap at one time but does not use it any longer

## 2022-09-26 NOTE — TELEPHONE ENCOUNTER
Estrella Gee from the  sleep center has called varias times to get a physician to physician order for patient to be seen  Pt has appointment tomorrow and without this they will have to cancel appointment

## 2022-09-28 DIAGNOSIS — Z00.00 WELL ADULT EXAM: ICD-10-CM

## 2022-09-28 RX ORDER — SUCRALFATE 1 G/1
TABLET ORAL
Qty: 360 TABLET | Refills: 0 | Status: SHIPPED | OUTPATIENT
Start: 2022-09-28

## 2022-09-29 ENCOUNTER — TELEPHONE (OUTPATIENT)
Dept: FAMILY MEDICINE CLINIC | Facility: CLINIC | Age: 62
End: 2022-09-29

## 2022-09-29 ENCOUNTER — DOCUMENTATION (OUTPATIENT)
Dept: FAMILY MEDICINE CLINIC | Facility: CLINIC | Age: 62
End: 2022-09-29

## 2022-09-29 DIAGNOSIS — N39.0 RECURRENT UTI: Primary | ICD-10-CM

## 2022-09-29 NOTE — TELEPHONE ENCOUNTER
----- Message from Jenny Santiago DO sent at 9/29/2022  7:45 AM EDT -----  Regarding: FW: Rocephin IV antibiotic slash tramadol 100 mg  Recommend infectious disease referral soon as possible  If patient is unable to void or fevers chills etcetera patient to go to ER for hospitalization  ----- Message -----  From: Yenny Guerra  Sent: 9/29/2022   6:16 AM EDT  To: Jenny Santiago DO  Subject: FW: Rocephin IV antibiotic slash tramadol 10#      ----- Message -----  From: Andres Byrd  Sent: 9/28/2022   6:22 PM EDT  To: [facility]  Subject: Rocephin IV antibiotic slash tramadol 100 mg     I also wanted to let you know Dr Gonzales Reyes that I did call the urologist to see if I could get in there early and they said at this time no we have to wait until November 16th

## 2022-10-05 ENCOUNTER — CLINICAL SUPPORT (OUTPATIENT)
Dept: CARDIOLOGY CLINIC | Facility: CLINIC | Age: 62
End: 2022-10-05
Payer: COMMERCIAL

## 2022-10-05 ENCOUNTER — OFFICE VISIT (OUTPATIENT)
Dept: VASCULAR SURGERY | Facility: CLINIC | Age: 62
End: 2022-10-05
Payer: COMMERCIAL

## 2022-10-05 VITALS
SYSTOLIC BLOOD PRESSURE: 140 MMHG | DIASTOLIC BLOOD PRESSURE: 106 MMHG | HEART RATE: 86 BPM | HEIGHT: 64 IN | OXYGEN SATURATION: 96 % | BODY MASS INDEX: 33.84 KG/M2 | WEIGHT: 198.2 LBS

## 2022-10-05 DIAGNOSIS — R00.2 PALPITATIONS: ICD-10-CM

## 2022-10-05 DIAGNOSIS — I65.29 CAROTID ARTERY STENOSIS: Primary | ICD-10-CM

## 2022-10-05 PROBLEM — I65.22 ASYMPTOMATIC STENOSIS OF LEFT CAROTID ARTERY: Status: ACTIVE | Noted: 2022-10-05

## 2022-10-05 PROCEDURE — 99214 OFFICE O/P EST MOD 30 MIN: CPT | Performed by: SURGERY

## 2022-10-05 PROCEDURE — 93248 EXT ECG>7D<15D REV&INTERPJ: CPT | Performed by: INTERNAL MEDICINE

## 2022-10-05 NOTE — ASSESSMENT & PLAN NOTE
Gonzales Starr is a 58-year-old woman who presents to the office following recent workup for stroke  Her workup was unremarkable for any acute infarct  She had presented with left-sided headaches, left-sided facial numbness and left upper/lower extremity numbness  I had a lengthy discussion with Gonzales Starr in the office in regards to her CTA findings  While the CTA does suggest an approximately 80% left carotid stenosis this is not her underlying etiology for her ongoing symptoms  She reports that her left-sided symptoms are proceeded by heart palpitations  She has history of atrial fibrillation    She does not have any focal neurologic deficits on exam     In addition to the above-mentioned symptoms she also had a variety of other complaints

## 2022-10-05 NOTE — PROGRESS NOTES
Assessment/Plan:    Asymptomatic stenosis of left carotid artery  Sarah Talamantes is a 64-year-old woman who presents to the office following recent workup for stroke  Her workup was unremarkable for any acute infarct  She had presented with left-sided headaches, left-sided facial numbness and left upper/lower extremity numbness  I had a lengthy discussion with Sarah Talamantes in the office in regards to her CTA findings  While the CTA does suggest an approximately 80% left carotid stenosis this is not her underlying etiology for her ongoing symptoms  She reports that her left-sided symptoms are proceeded by heart palpitations  She has history of atrial fibrillation  She does not have any focal neurologic deficits on exam     In addition to the above-mentioned symptoms she also had a variety of other complaints        Diagnoses and all orders for this visit:    Carotid artery stenosis  -     Ambulatory Referral to Vascular Surgery  -     VAS carotid complete study; Future          Subjective:      Patient ID: Dk Walker is a 57 y o  female  Patient is here today to review results of CTA head neck done 9/1/2022  Patient was admitted to [facility] on 9/1/2022 for stroke like symptoms of left sided facial numbness and left arm and left leg numbness and a headache  She c/o continued headaches, dizzness daily and continued left sided weakness  She denies any sudden loss of vision, trouble swallowing, slurred speech or any new TIA or Stroke symptoms  She is taking ASA 81 mg and Eliquis  She is a former smoker  Sarah Talamantes is a pleasant 64-year-old woman presents to the office following recent presentation to the ED for heart palpitations followed by left facial numbness headaches and left arm/leg numbness  Her CT workup was unremarkable for any acute infarcts  Her CTA of the neck did demonstrate approximately 80% left ICA stenosis  Her symptomatology and lesion do not correlate  I discussed this with patient today in the office    I recommend that she follow up with Cardiology and or Neurology  I have ordered a baseline carotid duplex  She should continue her current medical regimen  The following portions of the patient's history were reviewed and updated as appropriate: allergies, current medications, past family history, past medical history, past social history, past surgical history and problem list     Review of Systems   Constitutional: Negative  HENT: Negative  Eyes: Negative  Respiratory: Positive for shortness of breath  Cardiovascular: Negative  Gastrointestinal: Negative  Endocrine: Negative  Genitourinary: Negative  Musculoskeletal: Negative  Skin: Negative  Allergic/Immunologic: Negative  Neurological: Positive for dizziness, weakness and headaches  Hematological: Negative  Psychiatric/Behavioral: Negative  I have personally reviewed the ROS entered by MA and agree as documented  Objective:      BP (!) 140/106 (BP Location: Right arm, Patient Position: Sitting, Cuff Size: Standard)   Pulse 86   Ht 5' 4" (1 626 m)   Wt 89 9 kg (198 lb 3 2 oz)   LMP  (LMP Unknown)   SpO2 96%   BMI 34 02 kg/m²          Physical Exam  Constitutional:       General: She is not in acute distress  Appearance: She is well-developed  She is obese  She is not ill-appearing, toxic-appearing or diaphoretic  HENT:      Head: Normocephalic and atraumatic  Eyes:      General: No scleral icterus  Conjunctiva/sclera: Conjunctivae normal    Neck:      Trachea: No tracheal deviation  Cardiovascular:      Rate and Rhythm: Normal rate and regular rhythm  Heart sounds: Normal heart sounds  Pulmonary:      Effort: Pulmonary effort is normal       Breath sounds: Normal breath sounds  Abdominal:      General: There is no distension  Palpations: Abdomen is soft  There is no mass (no appreciable aortic pulsation/aneurysm)  Tenderness: There is no abdominal tenderness   There is no guarding or rebound  Musculoskeletal:         General: Normal range of motion  Cervical back: Normal range of motion and neck supple  Skin:     General: Skin is warm and dry  Neurological:      General: No focal deficit present  Mental Status: She is alert and oriented to person, place, and time  Psychiatric:         Mood and Affect: Mood normal          Behavior: Behavior normal          Thought Content: Thought content normal          Judgment: Judgment normal        CTA neck:  IMPRESSION:     1  No intracranial large vessel occlusion or critical stenosis  No aneurysm      2   Progression of atherosclerotic disease in the left cervical carotid artery with a new linear intraluminal defect at the origin/proximal ICA suggesting an ulcerated plaque resulting in about 80% stenosis of proximal ICA      3   Stable atherosclerotic change of right cervical carotid artery without hemodynamically significant stenosis

## 2022-10-06 ENCOUNTER — CONSULT (OUTPATIENT)
Dept: INFECTIOUS DISEASES | Facility: CLINIC | Age: 62
End: 2022-10-06
Payer: COMMERCIAL

## 2022-10-06 VITALS
DIASTOLIC BLOOD PRESSURE: 78 MMHG | HEIGHT: 64 IN | TEMPERATURE: 97.8 F | SYSTOLIC BLOOD PRESSURE: 126 MMHG | BODY MASS INDEX: 33.63 KG/M2 | OXYGEN SATURATION: 96 % | HEART RATE: 77 BPM | RESPIRATION RATE: 18 BRPM | WEIGHT: 197 LBS

## 2022-10-06 DIAGNOSIS — R33.9 URINE RETENTION: ICD-10-CM

## 2022-10-06 DIAGNOSIS — N39.0 E. COLI UTI: Primary | ICD-10-CM

## 2022-10-06 DIAGNOSIS — E66.9 OBESITY (BMI 30-39.9): ICD-10-CM

## 2022-10-06 DIAGNOSIS — N95.2 VAGINAL ATROPHY: ICD-10-CM

## 2022-10-06 DIAGNOSIS — N39.0 RECURRENT UTI: ICD-10-CM

## 2022-10-06 DIAGNOSIS — B96.20 E. COLI UTI: Primary | ICD-10-CM

## 2022-10-06 PROCEDURE — 99244 OFF/OP CNSLTJ NEW/EST MOD 40: CPT | Performed by: INTERNAL MEDICINE

## 2022-10-06 NOTE — PATIENT INSTRUCTIONS
Pt has urology consultation scheduled in mid November  No antibiotic therapy is indicated at this time  I would advise against oral suppressive antibiotic therapy as this could promote MDRO resistance pattern and side effects including C difficile colitis  Encourage adequate fluid hydration, drinking at least 48-64 oz of water daily  Advised importance of personal hygiene such as wiping front to back  Start vitamin-C 500mg po BID to help support immune system function and for its antioxidant effect  Pyridium up to 3x/day for 2-3 days prn bladder spasm  ID follow-up in 3 months

## 2022-10-06 NOTE — PROGRESS NOTES
Consultation - Infectious Disease   Tiffanie Levy 57 y o  female MRN: [id number]    Encounter: [id number]    IMPRESSION/RECOMMENDATIONS:   1  LUTS/recurrent UTI:  Risk factors for recurrent infection include advanced age and urine retention  She currently is afebrile and hemodynamically stable  WBC count is within normal limits  Patient has received multiple course of antibiotics over the past 1 year or greater  The E coli strain has now developed resistance to multiple antibiotics, thus significantly limiting her oral antibiotic treatment options  Patient reports recurrent symptoms 1-2 times per month  Symptoms include dysuria, abdominal cramps, back pain and weak urine stream   Postvoid residual bladder scan done last month demonstrated urine retention of 400 cc  However CT imaging did not demonstrate hydronephrosis or obstructive calculi  Pt reports lack of improvement with oral antibiotic therapy but did feel better for a few days after receiving treatment with ceftriaxone IV  She has also tried pyridium for symptom relief  Differential diagnosis includes vaginal atrophy for which pt was previously prescribed estrogen cream, but pt was unable to tolerate this due to skin irritation and itching  · No antibiotic therapy is advised at this time  · Would advise against antibiotic therapy unless pt is systemically ill with fever, nausea, vomiting, flank/kidney pain  Pt advised to contact this office immediately should she develop such symptoms    · I would advise against oral suppressive antibiotic therapy as this could promote MDRO resistance pattern and side effects including C difficile colitis  · Encourage adequate fluid hydration, drinking at least 48-64 oz of water daily  · Advised importance of personal hygiene such as wiping front to back  · Start vitamin-C 500mg po BID to help support immune system function and for its antioxidant effect  · Pyridium up to 3x/day for 2-3 days prn bladder spasm  · ID follow-up in 3 months  2  Urine retention: noted based on bladder scan and PVR of 400 cc last month during ER visit on 9/21/22  · Instructed pt to perform timed urine voidings every 3-4 hours during the day  · Urology evaluation noted on 11/16/22  3  History of vaginal atrophy: unable to tolerate estrogen cream  · Follow with gyneocology  · Consider use of coconut oil  4  Obesity with BMI of 33    My recommendations were discussed with the patient in detail who verbalized understanding  Thank you for allowing me to participate in the care of this patient  The ID service will follow  HISTORY OF PRESENT ILLNESS:  Reason for Consult: recurrent UTI, multiple antibiotic allergies, referred by Dr Tato Gates  CC: dysuria, abdominal pain  HPI: Aneudy Bhagat is a 57y o  year old female with a PMH of AFib on Eliquis, SMA thrombosis status post stent referred by PCP for evaluation of recurrent UTIs  Pt reports recurrent UTIs for more than one year  Pt reports symptoms occur 1-2x/month  She reports chronic inflammation of bladder and urethra with burning pain and weak urine stream  She has to strain at times to urinate  She also reports lower abdominal cramping and pain of her back that shoots up to her kidneys  She says she takes pyridium often  She was sent to the emergency room on 9/21/2022 by her PCP as she presented with abdominal pain, nausea, flank pain  Bladder scan demonstrated postvoid residual of 400 cc  Fenton catheter was placed and removed prior to discharge  CT abdomen and pelvis did not demonstrate acute findings of urine obstruction or pyelonephritis  She was treated with ceftriaxone IV in the emergency room and prescribed an additional 3 day course of cephalexin  She reports some improvement in symptoms after receiving iv ceftriaxone in ER  She reports chills the other night, but denies fever     She has  received multiple course of antibiotics over the past year including ciprofloxacin, ampicillin, nitrofurantoin, amoxicillin, cephalexin, cefpodoxime  REVIEW OF SYSTEMS:  Constitutional: +, chills  Negative for fever  HENT: +daily headaches  Negative runny nose, sore throat, congestion  Eyes: Negative for acute change in vision  Respiratory: + shortness of breath on occasion  Negative cough  Cardiovascular: +palpitations with anxiety  Negative for chest pain  Gastrointestinal: +abdominal pain, nausea  Negative for vomiting, diarrhea  Genitourinary: dysuria, flank painper HPI  Negative for hematuria  Musculoskeletal: +arthritis of elbows, knees  Skin:  Negative for rash or wound  Neurological: Negative for syncope, seizure  Hematological: +easy bruising  Negative for excessive bleeding  Psychiatric/Behavioral: Negative for confusion, hallucination  PAST MEDICAL HISTORY:  Past Medical History:   Diagnosis Date    A-fib [facility]) 03/2020    Allergic     Anxiety     Arthritis     Asthma     Colon polyp     Depression     GERD (gastroesophageal reflux disease)     Heart murmur     Hives     Hyperlipidemia     Hypertension     Infertility, female     Migraine     Palpitations     Psychiatric disorder     Wears glasses      Past Surgical History:   Procedure Laterality Date    COLONOSCOPY      EGD      EXPLORATORY LAPAROTOMY      for infertility    HAND SURGERY      Hand excision of tendon cyst    DE LAP,APPENDECTOMY N/A 5/3/2022    Procedure: APPENDECTOMY LAPAROSCOPIC;  Surgeon:  Isaiah Wilde MD;  Location: [facility];  Service: General    SUPERIOR MESTENTERIC ARTERY STENT      WISDOM TOOTH EXTRACTION       FAMILY HISTORY:  Family History   Problem Relation Age of Onset    Lung cancer Mother 61    Brain cancer Mother 61    Diabetes Father     Depression Father     Other Father         septic    Allergies Sister     Hashimoto's thyroiditis Sister     Abdominal aortic aneurysm Sister     Diabetes Sister     No Known Problems Sister     No Known Problems Maternal Grandmother     No Known Problems Maternal Grandfather     No Known Problems Paternal Grandmother     No Known Problems Paternal Grandfather     Hodgkin's lymphoma Brother     No Known Problems Brother     No Known Problems Maternal Aunt     Diabetes Other      SOCIAL HISTORY:  Social History   Social History     Substance and Sexual Activity   Alcohol Use Never     Social History     Substance and Sexual Activity   Drug Use No     Social History     Tobacco Use   Smoking Status Former Smoker    Packs/day: 0 50    Years: 10 00    Pack years: 5 00    Quit date: 2019    Years since quitting: 3 7   Smokeless Tobacco Never Used   Lives at home alone  Has no children due to infertility  No pets  ALLERGIES:  Allergies   Allergen Reactions    Ace Inhibitors Angioedema and Anaphylaxis     Anaphylaxis    Hydralazine Other (See Comments)     Lip swelling  Flank pain    Other Anaphylaxis and Other (See Comments)     Other reaction(s): angioadema  Other reaction(s): Other (See Comments)  Preservatives- itching throat closes, hi  Other reaction(s): angioadema  E Z Cat - hives throat closes itching,  Preservatives- itching throat closes, hi  Artificial sweeteners    Valsartan Angioedema     Lips, face swollen    Ampicillin Hives     Depends on brand some preservatives can react  Has recently tolerated oral amoxicillin   Benicar [Olmesartan] Angioedema     See Allergy note from 9/11/2008  Swollen ankles/legs    Sulfa Antibiotics Hives     stuffiness,itching,hives,throat closing     MEDICATIONS:  All current active medications have been reviewed    Antibiotics: none currently    Current Outpatient Medications:     acetaminophen (TYLENOL) 325 mg tablet, Take 650 mg by mouth as needed for mild pain, Disp: , Rfl:     apixaban (Eliquis) 5 mg, Take 1 tablet (5 mg total) by mouth 2 (two) times a day, Disp: 180 tablet, Rfl: 5    aspirin 81 mg chewable tablet, Chew 1 tablet (81 mg total) daily, Disp: , Rfl: 0    azelastine (OPTIVAR) 0 05 % ophthalmic solution, Administer 1 drop to both eyes 2 (two) times a day, Disp: 6 mL, Rfl: 12    azelastine (OPTIVAR) 0 05 % ophthalmic solution, Administer 1 drop to both eyes 2 (two) times a day, Disp: 6 mL, Rfl: 12    butalbital-acetaminophen-caffeine (FIORICET,ESGIC) -40 mg per tablet, Take 1 tablet by mouth every 6 (six) hours as needed for headaches, Disp: 30 tablet, Rfl: 2    cetirizine (ZyrTEC) 10 mg tablet, Take 1 tablet (10 mg total) by mouth daily for 365 doses, Disp: 30 tablet, Rfl: 11    chlordiazepoxide-clidinium (LIBRAX) 5-2 5 mg per capsule, Take 1 capsule by mouth 4 (four) times a day (before meals and at bedtime), Disp: 60 capsule, Rfl: 0    Cholecalciferol 25 MCG (1000 UT) tablet, Take 1,000 Units by mouth daily, Disp: , Rfl:     clotrimazole-betamethasone (LOTRISONE) 1-0 05 % cream, , Disp: , Rfl:     dexlansoprazole (DEXILANT) 60 MG capsule, Take 1 capsule (60 mg total) by mouth daily Please take on an empty stomach 30 minutes before eating, Disp: 30 capsule, Rfl: 3    diltiazem (CARDIZEM) 120 MG tablet, Take 120 mg by mouth if needed (For heart palpitations), Disp: , Rfl:     ELDERBERRY PO, Take by mouth daily, Disp: , Rfl:     Evolocumab 140 MG/ML SOAJ, Inject 1 mL (140 mg total) under the skin every 14 (fourteen) days, Disp: 2 mL, Rfl: 5    fluticasone-vilanterol (BREO ELLIPTA) 200-25 MCG/INH inhaler, Inhale 1 puff daily Rinse mouth after use , Disp: 3 Inhaler, Rfl: 3    furosemide (LASIX) 40 mg tablet, TAKE ONE TABLET BY MOUTH ONCE DAILY, Disp: 90 tablet, Rfl: 0    Ginger, Zingiber officinalis, (GINGER PO), Take 1 tablet by mouth in the morning, Disp: , Rfl:     Heparin Sodium, Porcine, (heparin, porcine,) 28136 UNIT/ML, , Disp: , Rfl:     hydrocortisone-pramoxine (PROCTOFOAM-HC) 1-1 % FOAM rectal foam, Insert 1 applicator into the rectum 2 (two) times a day, Disp: 10 g, Rfl: 2    ipratropium (ATROVENT) 0 06 % nasal spray, , Disp: , Rfl:     ketoconazole (NIZORAL) 2 % cream, Apply topically daily, Disp: 60 g, Rfl: 2    LORazepam (ATIVAN) 0 5 mg tablet, Take 1 tablet (0 5 mg total) by mouth every 8 (eight) hours as needed for anxiety, Disp: 90 tablet, Rfl: 0    LORazepam (ATIVAN) 1 mg tablet, Take 1 tablet (1 mg total) by mouth every 8 (eight) hours as needed for anxiety, Disp: 90 tablet, Rfl: 0    Meth-Hyo-M Bl-Na Phos-Ph Sal (Uribel) 118 MG CAPS, Take 1 each by mouth Three times a day, Disp: , Rfl:     metoprolol succinate (TOPROL-XL) 100 mg 24 hr tablet, Take 1 tablet (100 mg total) by mouth 2 (two) times a day, Disp: 180 tablet, Rfl: 2    montelukast (SINGULAIR) 10 mg tablet, Take 1 tablet (10 mg total) by mouth daily at bedtime, Disp: 90 tablet, Rfl: 0    MULTIPLE VITAMIN PO, Take by mouth, Disp: , Rfl:     nystatin (MYCOSTATIN) powder, Apply topically three times a weekly to affected area, Disp: 60 g, Rfl: 3    omeprazole (PriLOSEC) 40 MG capsule, Take 1 capsule (40 mg total) by mouth daily, Disp: 309 capsule, Rfl: 4    ondansetron (Zofran ODT) 4 mg disintegrating tablet, Take 1 tablet (4 mg total) by mouth every 6 (six) hours as needed for nausea or vomiting, Disp: 60 tablet, Rfl: 0    phenazopyridine (PYRIDIUM) 200 mg tablet, Take 1 tablet (200 mg total) by mouth 3 (three) times a day with meals, Disp: 90 tablet, Rfl: 0    Probiotic Product (PROBIOTIC-10 PO), Take 1 tablet by mouth in the morning, Disp: , Rfl:     spironolactone (ALDACTONE) 25 mg tablet, TAKE ONE TABLET BY MOUTH TWICE DAILY, Disp: 180 tablet, Rfl: 0    sucralfate (CARAFATE) 1 g tablet, TAKE ONE TABLET BY MOUTH FOUR TIMES DAILY, Disp: 360 tablet, Rfl: 0    traMADol (ULTRAM) 50 mg tablet, Take 1 tablet (50 mg total) by mouth 4 (four) times a day as needed for severe pain, Disp: 120 tablet, Rfl: 0    triamcinolone acetonide (KENALOG-40) 40 mg/mL, , Disp: , Rfl:     Turmeric 500 MG CAPS, , Disp: , Rfl:     Zinc 100 MG TABS, Take by mouth daily, Disp: , Rfl:   linaCLOtide (Linzess) 145 MCG CAPS, Take 1 capsule (145 mcg total) by mouth in the morning (Patient not taking: No sig reported), Disp: 30 capsule, Rfl: 2    naloxone (NARCAN) 4 mg/0 1 mL nasal spray, Administer 1 spray into a nostril  If no response after 2-3 minutes, give another dose in the other nostril using a new spray  (Patient not taking: No sig reported), Disp: 1 each, Rfl: 1    Current Facility-Administered Medications:     cyanocobalamin injection 1,000 mcg, 1,000 mcg, Intramuscular, Q30 Days, Jacquesomar Cuetok, , 1,000 mcg at 08/24/22 1412    PHYSICAL EXAM:  Vitals:    10/06/22 1424   BP: 126/78   BP Location: Left arm   Patient Position: Sitting   Cuff Size: Adult   Pulse: 77   Resp: 18   Temp: 97 8 °F (36 6 °C)   TempSrc: Temporal   SpO2: 96%   Weight: 89 4 kg (197 lb)   Height: 5' 4" (1 626 m)     General Appearance:  Appearing well, nontoxic, and in no distress   Head:  Normocephalic, without obvious abnormality, atraumatic   Eyes:  Conjunctiva pink and sclera anicteric, both eyes   Nose: Nasal mucosa is moist   Throat: Oral mucosa is moist   Neck: Supple   Lungs:   Clear to auscultation bilaterally, respirations unlabored   Heart:  S1, S2, RRR; no murmur   Abdomen:   Soft, non-tender, non-distended   :  No Fenton catheter present  No suprapubic or CVA tenderness   Extremities: No distal leg edema b/l   Skin: No rash or draining lesions  Lymph nodes: Cervical, supraclavicular nodes normal   Neurologic: Alert and oriented times to person, surroundings, conversant, fluent speech, HUTTON x 4     LABS, IMAGING, & OTHER STUDIES:  Lab Results:  I have personally reviewed pertinent labs, cultures,    Lab Results   Component Value Date     11/13/2017    K 3 5 09/21/2022     09/21/2022    CO2 29 09/21/2022    BUN 14 09/21/2022    CREATININE 0 89 09/21/2022    GLUF 114 (H) 06/06/2022    CALCIUM 9 3 09/21/2022    AST 20 09/21/2022    ALT 28 09/21/2022    ALKPHOS 115 09/21/2022    PROT 7 0 11/13/2017    BILITOT 0 3 11/13/2017    EGFR 68 09/21/2022     Lab Results   Component Value Date    WBC 7 13 09/21/2022    HGB 12 7 09/21/2022    HCT 38 7 09/21/2022    MCV 92 09/21/2022     09/21/2022 9/2/22 HbA1c 5 6    9/21/22 UA:  Dark yellow, clear, pH 6 5, leukocyte small, nitrite positive, WBC 2-4, bacteria none seen, RBC none seen    9/13/22 Urine cx: E coli, sensitive to cefazolin, Zosyn; intermediate to Augmentin, minocycline;  resistant to ampicillin, ciprofloxacin, nitrofurantoin, tetracycline, Bactrim    8/23/22 Urine cx: E coli    Imaging Studies:   9/21/22 CT abd/pelv with contr:  Mild bilateral renal collecting system fullness without hydronephrosis  No perinephric collection  No collecting system calculi  Bladder decompressed with a Fenton catheter  I have personally reviewed pertinent imaging study reports and images in PACS

## 2022-10-07 ENCOUNTER — HOSPITAL ENCOUNTER (OUTPATIENT)
Dept: NON INVASIVE DIAGNOSTICS | Facility: CLINIC | Age: 62
Discharge: HOME | End: 2022-10-07
Payer: COMMERCIAL

## 2022-10-07 ENCOUNTER — TELEPHONE (OUTPATIENT)
Dept: INFECTIOUS DISEASES | Facility: CLINIC | Age: 62
End: 2022-10-07

## 2022-10-07 DIAGNOSIS — I65.29 CAROTID ARTERY STENOSIS: ICD-10-CM

## 2022-10-07 PROCEDURE — 93880 EXTRACRANIAL BILAT STUDY: CPT

## 2022-10-07 NOTE — TELEPHONE ENCOUNTER
Pt calls office today after a consult with Dr Ricardo rWight  Pt states she forgot to ask Dr Ricardo Wright if there is anything she can do to get her body back to where she is no longer resistant to antibiotics anymore       CB:[phone]

## 2022-10-10 DIAGNOSIS — K21.9 GASTROESOPHAGEAL REFLUX DISEASE WITHOUT ESOPHAGITIS: ICD-10-CM

## 2022-10-10 PROCEDURE — 93880 EXTRACRANIAL BILAT STUDY: CPT | Performed by: SURGERY

## 2022-10-10 RX ORDER — OMEPRAZOLE 40 MG/1
40 CAPSULE, DELAYED RELEASE ORAL DAILY
Qty: 309 CAPSULE | Refills: 4 | Status: SHIPPED | OUTPATIENT
Start: 2022-10-10

## 2022-10-11 ENCOUNTER — TELEPHONE (OUTPATIENT)
Dept: VASCULAR SURGERY | Facility: CLINIC | Age: 62
End: 2022-10-11

## 2022-10-11 DIAGNOSIS — I48.0 PAF (PAROXYSMAL ATRIAL FIBRILLATION) (HCC): Primary | ICD-10-CM

## 2022-10-11 DIAGNOSIS — E78.49 OTHER HYPERLIPIDEMIA: ICD-10-CM

## 2022-10-11 NOTE — TELEPHONE ENCOUNTER
----- Message from Rafita Simms sent at 10/11/2022  8:54 AM EDT -----  Call patient for OV with Ba Galvin

## 2022-10-12 DIAGNOSIS — I48.0 PAF (PAROXYSMAL ATRIAL FIBRILLATION) (HCC): Primary | ICD-10-CM

## 2022-10-12 PROBLEM — N12 PYELONEPHRITIS: Status: RESOLVED | Noted: 2021-05-13 | Resolved: 2022-10-12

## 2022-10-12 PROBLEM — Z00.00 WELL ADULT EXAM: Status: RESOLVED | Noted: 2021-05-24 | Resolved: 2022-10-12

## 2022-10-13 ENCOUNTER — TELEPHONE (OUTPATIENT)
Dept: CARDIOLOGY CLINIC | Facility: CLINIC | Age: 62
End: 2022-10-13

## 2022-10-13 NOTE — TELEPHONE ENCOUNTER
Patient called and said she has not received her Repatha medication and also states it requires prior authorization  No form received from [employer]  for Prior Authorization  I placed call to [facility] to clarify if Prior Auth is needed  Pharmacist states they did receive electronic refill request but it does need prior authorization  She states the pharmacy had called our office twice with no answer and there was no voicemail box  I will be initiating Prior Authorization for Repatha  Called patient to make aware  She verbalized understanding

## 2022-10-13 NOTE — TELEPHONE ENCOUNTER
Prior Authorization required for Repatha Auto Injectors 140mg/mL  PA initiated  [facility] [employer] Oral Form  Key: [id number]      Sent to plan, awaiting determination

## 2022-10-14 NOTE — TELEPHONE ENCOUNTER
Received an email from covermeds with favorable outcome  I logged onto Carroll McMillan  PA is approved, however no details were given on coverage

## 2022-10-14 NOTE — TELEPHONE ENCOUNTER
Fax received from Allegra Fatima with approval letter  Approved for 12 months from 10/13/22 through 10/13/2023  Scanned and faxed to [facility]

## 2022-10-19 DIAGNOSIS — I10 HTN (HYPERTENSION) WITH GOAL TO BE DETERMINED: Primary | ICD-10-CM

## 2022-10-19 NOTE — TELEPHONE ENCOUNTER
Medication Refill Request     Name  diltiazem (CARDIZEM) 120 MG tablet  Dose/Frequency Take 120 mg by mouth if needed (For heart palpitations)  Quantity 45   Verified pharmacy   [x]  Verified ordering Provider   [x]  Does patient have enough for the next 3 days?  Yes [x] No []

## 2022-10-20 DIAGNOSIS — R22.9 SUBCUTANEOUS NODULE: ICD-10-CM

## 2022-10-20 RX ORDER — ONDANSETRON 4 MG/1
4 TABLET, ORALLY DISINTEGRATING ORAL EVERY 6 HOURS PRN
Qty: 60 TABLET | Refills: 2 | Status: SHIPPED | OUTPATIENT
Start: 2022-10-20

## 2022-10-20 NOTE — TELEPHONE ENCOUNTER
Requested medication(s) are due for refill today: Yes  Patient has already received a courtesy refill: No  Other reason request has been forwarded to provider: failed protocol, historical provider  Please advise

## 2022-10-21 DIAGNOSIS — I10 HTN (HYPERTENSION) WITH GOAL TO BE DETERMINED: ICD-10-CM

## 2022-10-21 RX ORDER — DILTIAZEM HYDROCHLORIDE 120 MG/1
TABLET, FILM COATED ORAL
Qty: 30 TABLET | Refills: 5 | Status: SHIPPED | OUTPATIENT
Start: 2022-10-21

## 2022-10-21 RX ORDER — DILTIAZEM HYDROCHLORIDE 120 MG/1
120 TABLET, FILM COATED ORAL AS NEEDED
Qty: 30 TABLET | Refills: 5 | Status: SHIPPED | OUTPATIENT
Start: 2022-10-21 | End: 2022-10-21 | Stop reason: SDUPTHER

## 2022-10-24 ENCOUNTER — TELEPHONE (OUTPATIENT)
Dept: CARDIOLOGY CLINIC | Facility: CLINIC | Age: 62
End: 2022-10-24

## 2022-10-24 NOTE — TELEPHONE ENCOUNTER
Phone call from patient  Scheduled to have Echo done November 29  Has appointment scheduled with Dr Sol Mesa November 8  Should she wait to see Dr Sol Mesa until after the Echo is complete  Please advise

## 2022-10-25 DIAGNOSIS — I10 HTN (HYPERTENSION) WITH GOAL TO BE DETERMINED: ICD-10-CM

## 2022-10-25 DIAGNOSIS — R00.2 PALPITATIONS: Primary | ICD-10-CM

## 2022-10-25 RX ORDER — DILTIAZEM HYDROCHLORIDE 120 MG/1
CAPSULE, EXTENDED RELEASE ORAL
Qty: 30 CAPSULE | Refills: 5 | Status: SHIPPED | OUTPATIENT
Start: 2022-10-25

## 2022-10-25 NOTE — TELEPHONE ENCOUNTER
Last script sent to pharmacy for diltiazem was filled as tablets not capsules  Patient states Diltiazem 120mER capsules work best for her and requests new script

## 2022-10-25 NOTE — TELEPHONE ENCOUNTER
Phone call to patient per Dr Kendra Wright  Ok to see Dr Jordy Garay before echo    Patient agreeable

## 2022-10-27 ENCOUNTER — OFFICE VISIT (OUTPATIENT)
Dept: FAMILY MEDICINE CLINIC | Facility: CLINIC | Age: 62
End: 2022-10-27
Payer: COMMERCIAL

## 2022-10-27 VITALS
BODY MASS INDEX: 33.8 KG/M2 | RESPIRATION RATE: 18 BRPM | SYSTOLIC BLOOD PRESSURE: 124 MMHG | TEMPERATURE: 97.2 F | HEART RATE: 58 BPM | WEIGHT: 198 LBS | OXYGEN SATURATION: 97 % | HEIGHT: 64 IN | DIASTOLIC BLOOD PRESSURE: 76 MMHG

## 2022-10-27 DIAGNOSIS — J41.0 SIMPLE CHRONIC BRONCHITIS (HCC): ICD-10-CM

## 2022-10-27 DIAGNOSIS — N39.0 RECURRENT UTI: ICD-10-CM

## 2022-10-27 DIAGNOSIS — I10 ESSENTIAL HYPERTENSION: ICD-10-CM

## 2022-10-27 DIAGNOSIS — K55.069 MESENTERIC ARTERY THROMBOSIS (HCC): Primary | ICD-10-CM

## 2022-10-27 PROCEDURE — 99214 OFFICE O/P EST MOD 30 MIN: CPT | Performed by: FAMILY MEDICINE

## 2022-10-27 RX ADMIN — CYANOCOBALAMIN 1000 MCG: 1000 INJECTION, SOLUTION INTRAMUSCULAR; SUBCUTANEOUS at 10:49

## 2022-10-27 NOTE — PROGRESS NOTES
Assessment/Plan:  Labs and records reviewed  Patient follow-up loss specialist regarding carotid artery stenosis, mesenteric artery thrombosis, recurrent UTI  Patient did see Infectious Disease  Patient will follow-up with Urology appropriately  Patient follow-up with  Gastroenterology appropriately too along with Neurology  Patient will continue with current medications for chronic conditions listed  Refills will be given when needed  The follow-up 3 months  B12 shot given at this time  Diagnoses and all orders for this visit:    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HCC)    Simple chronic bronchitis (HCC)    Essential hypertension    Recurrent UTI            Subjective:        Patient ID: Andre Gamboa is a 57 y o  female  Patient is here hypertension recurrent UTI this enteric artery thrombosis  Patient will be seeing specialist for mesentery artery thrombosis next few weeks as well as bladder specialist / neurologist in the next few weeks also  Patient will be seeing Dr Sloane Yao for Afib  Patient was seeing Dr Sree Stokes for carotid stenosis  The patient has notice feeling cold with some sweats but no fever  The patient does notice some palpitations with Afib  The following portions of the patient's history were reviewed and updated as appropriate: allergies, current medications, past family history, past medical history, past social history, past surgical history and problem list       Review of Systems   Constitutional: Negative  HENT: Negative  Eyes: Negative  Respiratory: Negative  Cardiovascular: Positive for palpitations  Gastrointestinal: Negative  Endocrine: Negative  Genitourinary: Positive for dysuria  Musculoskeletal: Negative  Skin: Negative  Allergic/Immunologic: Negative  Neurological: Negative  Hematological: Negative  Psychiatric/Behavioral: Negative              Objective:               /76 (BP Location: Right arm, Patient Position: Sitting, Cuff Size: Adult)   Pulse 58   Temp (!) 97 2 °F (36 2 °C) (Tympanic)   Resp 18   Ht 5' 4" (1 626 m)   Wt 89 8 kg (198 lb)   LMP  (LMP Unknown)   SpO2 97%   BMI 33 99 kg/m²          Physical Exam  Vitals and nursing note reviewed  Constitutional:       General: She is not in acute distress  Appearance: Normal appearance  She is not ill-appearing, toxic-appearing or diaphoretic  HENT:      Head: Normocephalic and atraumatic  Right Ear: Tympanic membrane, ear canal and external ear normal  There is no impacted cerumen  Left Ear: Tympanic membrane, ear canal and external ear normal  There is no impacted cerumen  Nose: Nose normal  No congestion or rhinorrhea  Mouth/Throat:      Mouth: Mucous membranes are moist       Pharynx: No oropharyngeal exudate or posterior oropharyngeal erythema  Eyes:      General: No scleral icterus  Right eye: No discharge  Left eye: No discharge  Extraocular Movements: Extraocular movements intact  Conjunctiva/sclera: Conjunctivae normal       Pupils: Pupils are equal, round, and reactive to light  Cardiovascular:      Rate and Rhythm: Normal rate and regular rhythm  Pulses: Normal pulses  Heart sounds: Normal heart sounds  No murmur heard  No friction rub  No gallop  Pulmonary:      Effort: Pulmonary effort is normal  No respiratory distress  Breath sounds: Normal breath sounds  No stridor  No wheezing, rhonchi or rales  Chest:      Chest wall: No tenderness  Musculoskeletal:         General: No swelling, tenderness, deformity or signs of injury  Normal range of motion  Cervical back: Normal range of motion and neck supple  No rigidity  No muscular tenderness  Right lower leg: No edema  Left lower leg: No edema  Lymphadenopathy:      Cervical: No cervical adenopathy  Skin:     General: Skin is warm and dry  Capillary Refill: Capillary refill takes less than 2 seconds  Coloration: Skin is not jaundiced  Findings: No bruising, erythema, lesion or rash  Neurological:      Mental Status: She is alert and oriented to person, place, and time  Mental status is at baseline  Cranial Nerves: No cranial nerve deficit  Sensory: No sensory deficit  Motor: No weakness  Coordination: Coordination normal       Gait: Gait normal    Psychiatric:         Mood and Affect: Mood normal          Behavior: Behavior normal          Thought Content:  Thought content normal          Judgment: Judgment normal

## 2022-11-02 ENCOUNTER — TELEPHONE (OUTPATIENT)
Dept: NEUROLOGY | Facility: CLINIC | Age: 62
End: 2022-11-02

## 2022-11-02 PROBLEM — N30.00 ACUTE CYSTITIS WITHOUT HEMATURIA: Status: RESOLVED | Noted: 2021-09-22 | Resolved: 2022-11-02

## 2022-11-07 ENCOUNTER — HOSPITAL ENCOUNTER (OUTPATIENT)
Dept: NON INVASIVE DIAGNOSTICS | Facility: CLINIC | Age: 62
Discharge: HOME | End: 2022-11-07

## 2022-11-07 DIAGNOSIS — R10.10 PAIN OF UPPER ABDOMEN: ICD-10-CM

## 2022-11-09 ENCOUNTER — CONSULT (OUTPATIENT)
Dept: CARDIOLOGY CLINIC | Facility: CLINIC | Age: 62
End: 2022-11-09

## 2022-11-09 VITALS
WEIGHT: 200.3 LBS | HEART RATE: 72 BPM | SYSTOLIC BLOOD PRESSURE: 128 MMHG | HEIGHT: 64 IN | BODY MASS INDEX: 34.19 KG/M2 | DIASTOLIC BLOOD PRESSURE: 72 MMHG

## 2022-11-09 DIAGNOSIS — R00.2 PALPITATION: ICD-10-CM

## 2022-11-09 DIAGNOSIS — J43.8 OTHER EMPHYSEMA (HCC): Primary | ICD-10-CM

## 2022-11-09 DIAGNOSIS — I48.0 PAF (PAROXYSMAL ATRIAL FIBRILLATION) (HCC): ICD-10-CM

## 2022-11-09 DIAGNOSIS — I49.1 PREMATURE ATRIAL CONTRACTIONS: ICD-10-CM

## 2022-11-09 DIAGNOSIS — R29.90 STROKE-LIKE SYMPTOMS: ICD-10-CM

## 2022-11-09 DIAGNOSIS — I10 BENIGN ESSENTIAL HYPERTENSION: ICD-10-CM

## 2022-11-09 DIAGNOSIS — R06.02 SHORTNESS OF BREATH: ICD-10-CM

## 2022-11-09 DIAGNOSIS — E66.9 OBESITY (BMI 30-39.9): ICD-10-CM

## 2022-11-09 PROBLEM — R34 OLIGOURIA: Status: RESOLVED | Noted: 2022-09-21 | Resolved: 2022-11-09

## 2022-11-09 RX ORDER — FLECAINIDE ACETATE 50 MG/1
50 TABLET ORAL 2 TIMES DAILY
Qty: 60 TABLET | Refills: 0 | Status: SHIPPED | OUTPATIENT
Start: 2022-11-09 | End: 2022-12-09

## 2022-11-09 NOTE — PATIENT INSTRUCTIONS
Take Flecanide 50mg twice daily for the next month  Return to clinic in 1 month for reassessment and EKG

## 2022-11-09 NOTE — PROGRESS NOTES
Consultation - Electrophysiology    Assessment    1  Symptomatic paroxysmal atrial fibrillation and PACs  2  Suspected familial hyperlipidemia  3  Hypertension  4  Class I Obesity with moderate CAMILLE (untreated)  5  TIA    Plan  We reviewed the pathogenesis, risk factors, and natural disease course of atrial fibrillation, and reviewed the major complications of arterial thromboembolism and reduction in LV EF due to sustained rapid ventricular rates  In her case, she had a TTE in September 2022 with findings of preserved LV EF, normal LA size, and no significant valvular disease  She had a pharmacologic nuclear stress in 2020 which was normal and she is not reporting new ischemic symptoms  Her [facility] telemetry was also reviewed, where she was noted to have a 3% burden of atrial fibrillation with rates up to 160bpm and lasting for 30 min to 2 hours per episode, corresponding with symptoms  Today's ECG shows sinus rhythm with frequent PACs and LVH with repolarization changes  Ultimately we offered 2 potential approaches to treating her symptoms:  -Conservative management with lifestyle modification, weight loss, and CPAP therapy, along with initiation of an anti-arrhythmic (Flecanide 50mg BID) with reassessment in 1 month  -Pulmonary vein isolation (Afib ablation), with a ~70-80% chance of first-time success and 1-2% risk of significant complications  She wishes to avoid a procedure at this time, and is willing to try medical therapy with anti-arrhythmics and lifestyle modification  She will continue to track her symptoms, start Flecanide, and continue Metoprolol 100mg QD + Apixaban 5mg BID for stroke prevention  HPI  Ms Sona Turk is a 57year old lady who presents for evaluation and treatment of her symptomatic arrhythmias  She has atrial fibrillation diagnosed in 2020 for which she has been on anticoagulation and metoprolol   She was also told to take diltiazem as needed when her heart rates are elevated  Recently she developed TIA symptoms, during which she was hospitalized  She also has noticed almost daily symptoms of skipped heart beats and was placed on a Ziopatch, showing symptoms corresponding to atrial fibrillation  She has CAMILLE diagnosed in 2020, but does not wear CPAP as she felt uncomfortable  She does not smoke, having quit in 2019, and never drinks alcohol  She has no family history of arrhythmia or sudden death  ROS: No chest pain, shortness of breath, fevers, chills, cough, wheezing, headache  Does report abdominal pain associated with eating and intermittent palpitations as above  Physical Exam  Constitutional:       General: She is not in acute distress  Appearance: Normal appearance  HENT:      Head: Normocephalic  Mouth/Throat:      Mouth: Mucous membranes are moist    Cardiovascular:      Rate and Rhythm: Normal rate and regular rhythm  Pulses: Normal pulses  Heart sounds: No murmur heard  Pulmonary:      Effort: Pulmonary effort is normal       Breath sounds: Normal breath sounds  Abdominal:      Tenderness: There is no abdominal tenderness  Musculoskeletal:      Right lower leg: No edema  Left lower leg: No edema  Neurological:      Mental Status: She is alert

## 2022-11-10 DIAGNOSIS — K55.069 MESENTERIC ARTERY THROMBOSIS (HCC): Primary | ICD-10-CM

## 2022-11-10 NOTE — TELEPHONE ENCOUNTER
ADD ON  patient called to reschedule due to being sick  I offered her 11-14-22 at 1230 pm with Dr Etienne Dillard in [facility] and she accepted

## 2022-11-11 DIAGNOSIS — G43.001 MIGRAINE WITHOUT AURA AND WITH STATUS MIGRAINOSUS, NOT INTRACTABLE: ICD-10-CM

## 2022-11-11 RX ORDER — CHLORDIAZEPOXIDE HYDROCHLORIDE AND CLIDINIUM BROMIDE 5; 2.5 MG/1; MG/1
1 CAPSULE ORAL
Qty: 60 CAPSULE | Refills: 0 | Status: SHIPPED | OUTPATIENT
Start: 2022-11-11 | End: 2022-11-14

## 2022-11-14 ENCOUNTER — OFFICE VISIT (OUTPATIENT)
Dept: NEUROLOGY | Facility: CLINIC | Age: 62
End: 2022-11-14

## 2022-11-14 VITALS
BODY MASS INDEX: 33.29 KG/M2 | WEIGHT: 195 LBS | TEMPERATURE: 97.2 F | DIASTOLIC BLOOD PRESSURE: 66 MMHG | HEIGHT: 64 IN | HEART RATE: 62 BPM | SYSTOLIC BLOOD PRESSURE: 143 MMHG

## 2022-11-14 DIAGNOSIS — G43.709 CHRONIC MIGRAINE WITHOUT AURA WITHOUT STATUS MIGRAINOSUS, NOT INTRACTABLE: Primary | ICD-10-CM

## 2022-11-14 DIAGNOSIS — M54.16 LUMBAR RADICULOPATHY: Primary | ICD-10-CM

## 2022-11-14 RX ORDER — CHLORHEXIDINE GLUCONATE ORAL RINSE 1.2 MG/ML
SOLUTION DENTAL
COMMUNITY
Start: 2022-10-31 | End: 2023-02-03

## 2022-11-14 NOTE — PROGRESS NOTES
Review of Systems   Constitutional: Negative for appetite change and fever  HENT: Negative  Negative for hearing loss, tinnitus, trouble swallowing and voice change  Eyes: Positive for photophobia  Negative for pain  Respiratory: Negative  Negative for shortness of breath  Cardiovascular: Negative  Negative for palpitations  Gastrointestinal: Positive for nausea  Negative for vomiting  Endocrine: Negative  Negative for cold intolerance  Genitourinary: Negative  Negative for dysuria, frequency and urgency  Musculoskeletal: Negative  Negative for myalgias and neck pain  Skin: Negative  Negative for rash  Neurological: Positive for headaches  Negative for dizziness, tremors, seizures, syncope, facial asymmetry, speech difficulty, weakness, light-headedness and numbness  Hematological: Negative  Does not bruise/bleed easily  Psychiatric/Behavioral: Negative  Negative for confusion, hallucinations and sleep disturbance

## 2022-11-14 NOTE — PATIENT INSTRUCTIONS
Follow up with sleep medicine doctor 12/27/22 and in the mean time try and use your CPAP as often as possible     Ideally I believe you should not be on any NSAIDs with your history of ulcer and on a blood thinner   - this included exedrin which as 3 aspirin in it     Talk with the provider prescribing : Librax and make sure you are supposed to be on this long term as I was under the impression this is typically something short course      Headache/migraine treatment:   Rescue medications (for immediate treatment of a headache): It is ok to take acetaminophen  if they help your headaches you should limit these to No more than 3 times a week to avoid medication overuse/rebound headaches  Wean off Fioricet - ideally we want to get you off completely in the long run but not cold turkey because aburpt withdrawal can cause seizures       Prescription preventive medications for headaches/migraines   (to take every day to help prevent headaches - not to take at the time of headache):  [x] Emgality/Galcanezumab - the 1st dose is 240 mg loading dose of 2 consecutive 120 mg injections  Thereafter, 120 mg injections every 30 days     If needed there is a coupon card for the copay at [email or web address]  com     READ INSTRUCTIONS that come with the medication  REFRIGERATE  Keep out of direct sunlight  Prior to administration, allow to come to room temperature for 30 minutes  Do not warm using a heat source (eg, microwave or hot water)  Do not shake  Administer in preferably abdomen (avoiding 2 inches around the navel), thigh, upper arm, or buttocks avoiding areas of skin that are tender, bruised, red or hard  Deliver entire contents of single-use prefilled pen or syringe  Unknown impact in pregnancy therefore would recommend stopping 6 months prior to considering pregnancy        *Typically these types of medications take time until you see the benefit, although some may see improvement in days, often it may take weeks, especially if the medication is being titrated up to a beneficial level  Please contact us if there are any concerns or questions regarding the medication  Lifestyle Recommendations:  [x] SLEEP - Maintain a regular sleep schedule: Adults need at least 7-8 hours of uninterrupted a night  Maintain good sleep hygiene:  Going to bed and waking up at consistent times, avoiding excessive daytime naps, avoiding caffeinated beverages in the evening, avoid excessive stimulation in the evening and generally using bed primarily for sleeping  One hour before bedtime would recommend turning lights down lower, decreasing your activity (may read quietly, listen to music at a low volume)  When you get into bed, should eliminate all technology (no texting, emailing, playing with your phone, iPad or tablet in bed)  [x] HYDRATION - Maintain good hydration  Drink  2L of fluid a day (4 typical small water bottles)  [x] DIET - Maintain good nutrition  In particular don't skip meals and try and eat healthy balanced meals regularly  [x] TRIGGERS - Look for other triggers and avoid them: Limit caffeine to 1-2 cups a day or less  Avoid dietary triggers that you have noticed bring on your headaches (this could include aged cheese, peanuts, MSG, aspartame and nitrates)  [x] EXERCISE - physical exercise as we all know is good for you in many ways, and not only is good for your heart, but also is beneficial for your mental health, cognitive health and  chronic pain/headaches  I would encourage at the least 5 days of physical exercise weekly for at least 30 minutes  Education and Follow-up  [x] Please call with any questions or concerns  Of course if any new concerning symptoms go to the emergency department  [x] Follow up 3-4 months, sooner if needed    Regarding Fioricet:  - Fioricet is a drug sometimes prescribed to treat migraines, but it is not FDA approved for this use and it is not at all recommended by most headache specialists    More than any drugs (including narcotics) using Fioricet or Fiorinal makes migraines worse over time, which is why these drugs are banned in most [place]  - Fioricet contains caffeine, acetaminophen (Tylenol) and butalbital   Butalbital is a barbiturate  While benzodiazepine drugs for anxiety such as Valium, Klonopin, and Xanax have many side effects with chronic use, butalbital belongs to a class of drugs called barbiturates  Compared to Valium or Xanax, barbiturates are more likely to cause death (think Carley Johns or Troy Kerns), and stopping Fioricet or Fiorinal commonly causes seizures  In large studies using Fioricet or Fiorinal even 1 day per week worsens migraines  - In general, providers should not prescribe Fioricet or Fiorinal for migraines and I do not recommend these at all

## 2022-11-14 NOTE — PROGRESS NOTES
[facility] Consult  PATIENT:  Shamika Viveros  MRN:  [id number]  :  1960  DATE OF SERVICE:  2022  REFERRED BY: Jose Luis Lorenz DO  PMD: Nasima Dumont DO    Assessment/Plan:     Shamika Viveros is a very pleasant  57 y o  female with a past medical history that includes  migraines, anxiety, depression, hypertension, chronic pain syndrome on chronic opioids (tramadol), hypertensive heart disease, peripheral vascular disease, gastric ulcer without hemorrhage or perforation, GERD, mesenteric artery thrombosis, stenosis of left carotid artery (followed by Cardiology) allergic rhinitis, vasomotor rhinitis, Moderate CAMILLE on CPAP, (home sleep study 2020 DENZEL 14), COPD, asthma, chronic neck pain, chronic back pain, lumbar radiculopathy, arthritis, atopic dermatitis, interstitial cystitis, patellar tendinitis, hyperlipidemia, insomnia, RLS, bilateral age-related macular degeneration, angioedema, hair loss, chronic fatigue, B12 deficiency, diverticulitis, paroxysmal atrial fibrillation on Eliquis referred here for evaluation of headache  My initial evaluation 2022    Chronic Migraine without aura and with status migrainosus, not intractable  She reports a long history of headaches and migraines that seemed to increase around perimenopause in her 40s and then again in the past few years  As of initial visit 2022 she reports chronic daily headaches for 2-3 years and worse over the past few months, likely due to stopping using her CPAP machine for the past few months  In 2022, she was hospitalized and evaluated by Neurology for stroke-like symptoms in the setting of migraine with negative MRI and symptoms lasting over 24 hours not consistent with TIA either, per inpatient neurology thought to be migraine with left-sided paresthesias    She is on Eliquis already and also takes baby aspirin daily, and as of initial visit multiple other NSAIDs which we discussed are likely not recommended considering the Eliquis  She reports migraines are typically left retro-orbital pressure radiating into the left temporal region and less severe on the right temporal region  She denies aura reports typical associated migrainous features with some unilateral autonomic features, less likely trigeminal autonomic cephalalgia  - as of 11/14/2022:  Chronic daily headache for 2-3 years, worse migraine at least 3 days a week  Recommended she start using her CPAP machine again as this has significant morbidity and mortality not used  Recommended she discontinue Fioricet which she is taking up to 1-4 times in a day and typically daily, we discussed gradually wean off over the next days with goal of completely getting her off as this can increase migraines  This also has significant caffeine in it that could be impacting her sleep and other conditions such as urinary symptoms  She is also taking daily benzos for abdominal pain and daily benzos for anxiety as well as daily tramadol and recommend she discuss these with her providers if there are other options  Trial of emgality for prevention  Workup:  - Neurologic exam was unremarkable for concerning findings  - MRI brain without contrast 09/01/2022: No evidence of acute infarct, intracranial hemorrhage or mass  - CTA head and neck with without contrast 09/01/2022: 1  No intracranial large vessel occlusion or critical stenosis  No aneurysm  2   Progression of atherosclerotic disease in the left cervical carotid artery with a new linear intraluminal defect at the origin/proximal ICA suggesting an ulcerated plaque resulting in about 80% stenosis of proximal ICA  3   Stable atherosclerotic change of right cervical carotid artery without hemodynamically significant stenosis  - carotid ultrasound 10/07/2022: RIGHT: There is <50% stenosis noted in the internal carotid artery  Plaque is Heterogenous  Vertebral artery flow is antegrade   There is no significant subclavian artery disease  LEFT: There is >70% stenosis noted in the internal carotid artery  Plaque is heterogenous  Vertebral artery flow is antegrade  There is no significant subclavian artery disease   - MRI brain with without contrast 10/20/2018 for headache with facial paresthesias: Scattered anterior bifrontal white matter T2 and FLAIR hyperintense foci which are nonspecific but suspicious for mild chronic microangiopathic changes such as may accompany migraine headaches  Findings are stable  No pathologic enhancement  No acute ischemic disease identified by diffusion imaging    Preventative:  - we discussed headache hygiene and lifestyle factors that may improve headaches  - trial of emgality   - Currently on through other providers:  Furosemide/Lasix, spironolactone/Aldactone, metoprolol 100 mg b i d , diltiazem  - Past/ failed/contraindicated: ACE inhibitors anaphylaxis, valsartan side effects, Olmesartan side effects, metoprolol, Propranolol contraindicated due to history of asthma and interaction with current medications, Amitriptyline, paroxetine/paxil, bupropion/wellbutrin, citalopram/celexa, Gabapentin   - future options: Alternative CGRP med, botox    Rescue:  - discussed not taking over-the-counter or prescription pain medications more than 3 days per week to prevent medication overuse/rebound headache  - will consider alternative abortive agent once migraines have decreased in frequency as migraines are so frequent anything added for rescue could be taken to often  - Currently on through other providers:   We discussed I recommend completely discontinuing the daily Fioricet but since she may be addicted gradually weaning off of it, tramadol 3 times a day for stomach or bladder pain, Librax - chlordiazepoxide-clidinium (anti spasmodic agent/benzo) - prn abdominal pain 1-2 times a day - 2 at most, we discussed she should not be taking Motrin likely or Excedrin likely due to that NSAIDs, ondansetron for nausea  - Past/ failed/contraindicated: Toradol has helped but she is on Eliquis, sumatriptan, rizatriptan she thinks helped  - future options:  Could consider Triptan (no history of stroke and does not appear to have CAD), prochlorperazine,  could consider trial of 5 days of Depakote 500 mg nightly or dexamethasone 2 mg daily for prolonged migraine, Tess Merlin, reyvow, nurtec      Patient instructions     Follow up with sleep medicine doctor 12/27/22 and in the mean time try and use your CPAP as often as possible     Ideally I believe you should not be on any NSAIDs with your history of ulcer and on a blood thinner correct?   - this included in exedrin which as 3 aspirin in it, this also includes the Motrin you have been taking    Talk with the provider prescribing : Librax and make sure you are supposed to be on this long term as I was under the impression this is typically something short course    Regarding Fioricet:  - Dorrine Toan is a drug sometimes prescribed to treat migraines, but it is not FDA approved for this use and it is not at all recommended by most headache specialists  More than any drugs (including narcotics) using Fioricet or Fiorinal makes migraines worse over time, which is why these drugs are banned in most Of [place]  - Fioricet contains caffeine, acetaminophen (Tylenol) and butalbital   Butalbital is a barbiturate  While benzodiazepine drugs for anxiety such as Valium, Klonopin, and Xanax have many side effects with chronic use, butalbital belongs to a class of drugs called barbiturates  Compared to Valium or Xanax, barbiturates are more likely to cause death (think Jakub Paris or William Nguyen), and stopping Fioricet or Fiorinal commonly causes seizures  In large studies using Fioricet or Fiorinal even 1 day per week worsens migraines  - In general, providers should not prescribe Fioricet or Fiorinal for migraines and I do not recommend these at all  Headache/migraine treatment:   Rescue medications (for immediate treatment of a headache): It is ok to take acetaminophen  if they help your headaches you should limit these to No more than 3 times a week to avoid medication overuse/rebound headaches  Wean off Fioricet - ideally we want to get you off completely in the long run but not cold turkey because aburpt withdrawal can cause seizures       Prescription preventive medications for headaches/migraines   (to take every day to help prevent headaches - not to take at the time of headache):  [x] Emgality/Galcanezumab - the 1st dose is 240 mg loading dose of 2 consecutive 120 mg injections  Thereafter, 120 mg injections every 30 days     If needed there is a coupon card for the copay at [employer]     READ INSTRUCTIONS that come with the medication  REFRIGERATE  Keep out of direct sunlight  Prior to administration, allow to come to room temperature for 30 minutes  Do not warm using a heat source (eg, microwave or hot water)  Do not shake  Administer in preferably abdomen (avoiding 2 inches around the navel), thigh, upper arm, or buttocks avoiding areas of skin that are tender, bruised, red or hard  Deliver entire contents of single-use prefilled pen or syringe  Unknown impact in pregnancy therefore would recommend stopping 6 months prior to considering pregnancy  *Typically these types of medications take time until you see the benefit, although some may see improvement in days, often it may take weeks, especially if the medication is being titrated up to a beneficial level  Please contact us if there are any concerns or questions regarding the medication  Lifestyle Recommendations:  [x] SLEEP - Maintain a regular sleep schedule: Adults need at least 7-8 hours of uninterrupted a night   Maintain good sleep hygiene:  Going to bed and waking up at consistent times, avoiding excessive daytime naps, avoiding caffeinated beverages in the evening, avoid excessive stimulation in the evening and generally using bed primarily for sleeping  One hour before bedtime would recommend turning lights down lower, decreasing your activity (may read quietly, listen to music at a low volume)  When you get into bed, should eliminate all technology (no texting, emailing, playing with your phone, iPad or tablet in bed)  [x] HYDRATION - Maintain good hydration  Drink  2L of fluid a day (4 typical small water bottles)  [x] DIET - Maintain good nutrition  In particular don't skip meals and try and eat healthy balanced meals regularly  [x] TRIGGERS - Look for other triggers and avoid them: Limit caffeine to 1-2 cups a day or less  Avoid dietary triggers that you have noticed bring on your headaches (this could include aged cheese, peanuts, MSG, aspartame and nitrates)  [x] EXERCISE - physical exercise as we all know is good for you in many ways, and not only is good for your heart, but also is beneficial for your mental health, cognitive health and  chronic pain/headaches  I would encourage at the least 5 days of physical exercise weekly for at least 30 minutes  Education and Follow-up  [x] Please call with any questions or concerns  Of course if any new concerning symptoms go to the emergency department  [x] Follow up 3-4 months, sooner if needed        CC: We had the pleasure of evaluating Komal Garcia in neurological consultation today  Komal Garcia is a   right handed female who presents today for evaluation of headaches  History obtained from patient as well as available medical record review    History of Present Illness:   Current medical illnesses  or past medical history include migraines, anxiety, depression, hypertension, chronic pain syndrome on chronic opioids (tramadol), hypertensive heart disease, peripheral vascular disease, gastric ulcer without hemorrhage or perforation, GERD, mesenteric artery thrombosis, stenosis of left carotid artery (followed by Cardiology) allergic rhinitis, vasomotor rhinitis, Moderate CAMILLE on CPAP, (home sleep study 12/2020 DENZEL 14), COPD, asthma, chronic neck pain, chronic back pain, lumbar radiculopathy, arthritis, atopic dermatitis, interstitial cystitis, patellar tendinitis, hyperlipidemia, insomnia, RLS, bilateral age-related macular degeneration, angioedema, hair loss, chronic fatigue, B12 deficiency, diverticulitis, paroxysmal atrial fibrillation on Eliquis     On 09/01/2022, she went to bed around midnight with mild left facial numbness and woke at 6 that morning with left facial, truncal, arm and leg numbness as well as perceived left leg weakness and felt like speech was "off" associated with 5/10 migraine with nausea  Took her morning med Eliquis along with Tylenol and Zofran went to PCP who alerted EMS and sent her to the emergency department for possible stroke  She arrived with elevated blood pressure , and IHSS 1 for sensory deficits  Noncontrast head CT, CTA head and neck were unremarkable for acute pathology and significant stenosis  Stable atherosclerosis  While she was examined by Neurology in the ED she reported improvement in sensory deficits and reported similar migraine 08/29/2022 with left jaw numbness however did not seek medical care at that time  Per neurology note etiology thought to be related to migraine as MRI was without acute pathology  The next day during hospitalization still had migraines and given another dose of migraine cocktail  Presentation not consistent with TIA given symptoms lasted more than 24 hours  Headaches started at what age? Some when younger   How often do the headaches occur?   - sinusitis when older, headaches around menses when younger   - menopause early 40's then migraines increased   - as of 11/14/2022: chronic daily headache for 2-3 years, worse 3 times a week at least   What time of the day do the headaches start?   Wakes up with them sometimes, morning or afternoon   How long do the headaches last? For worse all day without meds    Aura? without aura     Last eye exam: 2021 - wears glasses, macular degeneration     Where is your headache located and pain quality?   - left eye retro-orbital pressure and to the left temporal region, pulsating  - a little on the right temporal is not as bad       What is the intensity of pain? Average: 7/10, worst 10/10  Associated symptoms:   [x] Nausea       [] Vomiting         [x] Photophobia     [x]Phonophobia      [x] Osmophobia  [x] Blurred vision - left   [x] Light-headed or dizzy  - sometimes   [] Tinnitus   [x] Hands or feet tingle or feel numb/paresthesias  - yes see HPI  [] Ptosis      [] Facial droop  [x] Lacrimation - left  [x] Nasal congestion/rhinorrhea      Things that make the headache worse? No specific movements, any movement     Headache triggers:  Hormonal in the past, weather changes, stress       Have you seen someone else for headaches or pain? Yes, neurology   Are you current pregnant or planning on getting pregnant? no  Have you ever had any Brain imaging? Yes    What medications do you take or have you taken for your headaches? ABORTIVE/pertinent p r n  Meds:      Usually tylenol and motrin - not supposed to be on due to blood thinner and ulcer   exedrin migraine - when not taking fioricet     Fioricet (barbiturate, acetaminophen, caffeine pill) - takes daily for the past couple of weeks, 1 this morning, 1 this afternoon, 3-4 a day - sees it wearing off     Librax - chlordiazepoxide-clidinium (anti spasmodic agent/benzo) - prn abdominal pain 1-2 times a day - 2 at most     Tramadol for stomach or bladder - 3 a day since last year     Ondansetron for nausea     Lorazepam/Ativan 1 mg q 8 hours p r n   Anxiety - 2-3 times a day     Past  toradol has helped  Rizatriptan - helped she thinks  Sumatriptan 100 mg    PREVENTIVE/pertinent daily meds:     Furosemide/Lasix  Spironolactone/Aldactone  Metoprolol 100 mg b i d    Diltiazem    Past/ failed/contraindicated:  ACE inhibitors anaphylaxis  Valsartan side effects  Olmesartan side effects  Propranolol contraindicated due to history of asthma and interaction with current medications  Amitriptyline   paxil  wellbutrin  celexa  Gabapentin         LIFESTYLE  Sleep last visit with sleep medicine 09/03/2021  Moderate CAMILLE on CPAP, (home sleep study 12/2020 DENZEL 14) - stopped using CPAP 4 months ago   - averages: 4-5 hours   Problems falling asleep?:   Yes  Problems staying asleep?:  Yes    Water: 4 bottles per day  Caffeine: none except for exedrin and fioricet      Mood: no job since 2020, a lot of stress and 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, but went back and loved that job and the stent issue came up so she was terminated  Anxiety, depression is not bad with the meds and breathing     The following portions of the patient's history were reviewed and updated as appropriate: allergies, current medications, past family history, past medical history, past social history, past surgical history and problem list     Pertinent family history:  Family history of headaches:  Migraines in father, brother, sister, mom possibly   Any family history of aneurysms - Yes - grandfather     Pertinent social history:  Work: [employer] and MR at [employer]   Education: [employer] with no one      Past Medical History:     Past Medical History:   Diagnosis Date   • A-fib ([facility] ) 03/2020   • Allergic    • Anxiety    • Arthritis    • Asthma    • Colon polyp    • Depression    • GERD (gastroesophageal reflux disease)    • Heart murmur    • Hives    • Hyperlipidemia    • Hypertension    • Infertility, female    • Migraine    • Palpitations    • Psychiatric disorder    • Wears glasses        Patient Active Problem List   Diagnosis   • Acute upper respiratory infection   • Allergic rhinitis   • [facility]   • Anxiety and depression   • Arthritis   • Asthma due to seasonal allergies   • Attention disturbance   • Benign essential hypertension   • Interstitial cystitis   • Chronic low back pain   • Familial hyperlipidemia   • Elevated antinuclear antibody (SON) level   • Elevated blood sugar   • Essential hypertension   • Female pelvic pain   • Hyperlipidemia   • Lipid disorder   • Migraines   • Obesity (BMI 30-39  9)   • Tick bite   • Tobacco abuse   • Venous insufficiency   • Anxiety   • Insomnia   • Snoring   • Cervicalgia   • Headache   • AMD (age-related macular degeneration), bilateral   • Allergic reaction   • Palpitations and chest pain     • Intertrigo   • Acute gastric ulcer without hemorrhage or perforation   • Tinea corporis   • Gastroesophageal reflux disease without esophagitis   • Allergic conjunctivitis of both eyes   • Intrinsic atopic dermatitis   • Angio-edema   • Vasomotor rhinitis   • Other chest pain   • Low TSH level   • Paroxysmal atrial fibrillation (HCC)   • Vaginal candidiasis   • WELLINGTON (dyspnea on exertion)   • Hair loss   • Patellar tendinitis of left knee   • Injury of toe on right foot   • Wheezing   • Lumbar radiculopathy   • Chronic fatigue   • Acute pyelonephritis   • CAMILLE (obstructive sleep apnea)   • Hypertensive heart disease with congestive heart failure ([facility])   • Shortness of breath   • Unspecified diastolic (congestive) heart failure ([facility])   • Vitamin B12 deficiency   • Left upper quadrant pain   • Diverticulitis   • 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[facility])   • Hypoxia   • Chronic bronchitis, unspecified chronic bronchitis type ([facility])   • Left arm pain   • Hematoma   • Continuous opioid dependence ([facility])   • COPD (chronic obstructive pulmonary disease) ([facility])   • Moderate persistent asthma without complication   • Urinary retention   • Per[facility] )   • Appendix disease   • S/P appendectomy   • Pain of upper abdomen   • Left upper quadrant abdominal pain   • Recurrent UTI   • Stroke-like symptoms   • Localized swelling of left lower extremity   • Acute CVA (cerebrovascular accident) [facility])   • Carotid artery stenosis   • Stenosis of left carotid artery   • Asymptomatic stenosis of left carotid artery   • Premature atrial contractions       Medications:      Current Outpatient Medications   Medication Sig Dispense Refill   • acetaminophen (TYLENOL) 325 mg tablet Take 650 mg by mouth as needed for mild pain     • apixaban (Eliquis) 5 mg Take 1 tablet (5 mg total) by mouth 2 (two) times a day 180 tablet 5   • aspirin 81 mg chewable tablet Chew 1 tablet (81 mg total) daily  0   • azelastine (OPTIVAR) 0 05 % ophthalmic solution Administer 1 drop to both eyes 2 (two) times a day 6 mL 12   • butalbital-acetaminophen-caffeine (FIORICET,ESGIC) -40 mg per tablet Take 1 tablet by mouth every 6 (six) hours as needed for headaches 30 tablet 0   • cetirizine (ZyrTEC) 10 mg tablet Take 1 tablet (10 mg total) by mouth daily for 365 doses 30 tablet 11   • chlordiazepoxide-clidinium (LIBRAX) 5-2 5 mg per capsule TAKE 1 CAPSULE BY MOUTH 4 (FOUR) TIMES A DAY (BEFORE MEALS AND AT BEDTIME) 360 capsule 1   • chlorhexidine (PERIDEX) 0 12 % solution      • Cholecalciferol 25 MCG (1000 UT) tablet Take 1,000 Units by mouth daily     • clotrimazole-betamethasone (LOTRISONE) 1-0 05 % cream      • diltiazem (DILACOR XR) 120 MG 24 hr capsule One tablet daily as needed for palpitations 30 capsule 5   • ELDERBERRY PO Take by mouth daily     • Evolocumab 140 MG/ML SOAJ Inject 1 mL (140 mg total) under the skin every 14 (fourteen) days 2 mL 5   • fluticasone-vilanterol (BREO ELLIPTA) 200-25 MCG/INH inhaler Inhale 1 puff daily Rinse mouth after use  3 Inhaler 3   • furosemide (LASIX) 40 mg tablet TAKE ONE TABLET BY MOUTH ONCE DAILY 90 tablet 0   • Galcanezumab-gnlm 120 MG/ML SOAJ Inject 120 mg under the skin every 30 (thirty) days Following the first month loading dose of 2 pens   1 mL 11   • Galcanezumab-gnlm 120 MG/ML SOAJ Inject 240 mg under the skin once for 1 dose For just the first month loading dose, followed by 1 injection monthly on separate prescription   2 mL 0   • Ginger, Zingiber officinalis, (GINGER PO) Take 1 tablet by mouth in the morning     • hydrocortisone-pramoxine (PROCTOFOAM-HC) 1-1 % FOAM rectal foam Insert 1 applicator into the rectum 2 (two) times a day 10 g 2   • ipratropium (ATROVENT) 0 06 % nasal spray      • ketoconazole (NIZORAL) 2 % cream Apply topically daily 60 g 2   • LORazepam (ATIVAN) 1 mg tablet Take 1 tablet (1 mg total) by mouth every 8 (eight) hours as needed for anxiety 90 tablet 0   • metoprolol succinate (TOPROL-XL) 100 mg 24 hr tablet Take 1 tablet (100 mg total) by mouth 2 (two) times a day 180 tablet 2   • montelukast (SINGULAIR) 10 mg tablet TAKE ONE TABLET BY MOUTH DAILY AT BEDTIME 90 tablet 0   • MULTIPLE VITAMIN PO Take by mouth     • nystatin (MYCOSTATIN) powder Apply topically three times a weekly to affected area 60 g 3   • omeprazole (PriLOSEC) 40 MG capsule Take 1 capsule (40 mg total) by mouth daily 309 capsule 4   • ondansetron (Zofran ODT) 4 mg disintegrating tablet Take 1 tablet (4 mg total) by mouth every 6 (six) hours as needed for nausea or vomiting 60 tablet 2   • phenazopyridine (PYRIDIUM) 200 mg tablet Take 1 tablet (200 mg total) by mouth 3 (three) times a day with meals 90 tablet 0   • Probiotic Product (PROBIOTIC-10 PO) Take 1 tablet by mouth in the morning     • spironolactone (ALDACTONE) 25 mg tablet Take 1 tablet (25 mg total) by mouth 2 (two) times a day 180 tablet 0   • sucralfate (CARAFATE) 1 g tablet TAKE ONE TABLET BY MOUTH FOUR TIMES DAILY 360 tablet 0   • traMADol (ULTRAM) 50 mg tablet Take 1 tablet (50 mg total) by mouth 4 (four) times a day as needed for severe pain 120 tablet 0   • triamcinolone acetonide (KENALOG-40) 40 mg/mL      • Turmeric 500 MG CAPS      • Zinc 100 MG TABS Take by mouth daily     • azelastine (OPTIVAR) 0 05 % ophthalmic solution Administer 1 drop to both eyes 2 (two) times a day 6 mL 12   • dexlansoprazole (DEXILANT) 60 MG capsule Take 1 capsule (60 mg total) by mouth daily Please take on an empty stomach 30 minutes before eating (Patient not taking: Reported on 11/14/2022) 30 capsule 3   • diltiazem (CARDIZEM) 120 MG tablet One tablet daily as needed for heart palpitations (Patient not taking: Reported on 11/14/2022) 30 tablet 5   • flecainide (TAMBOCOR) 50 mg tablet Take 1 tablet (50 mg total) by mouth 2 (two) times a day (Patient not taking: Reported on 11/14/2022) 60 tablet 0   • Heparin Sodium, Porcine, (heparin, porcine,) 96760 UNIT/ML  (Patient not taking: Reported on 11/14/2022)     • linaCLOtide (Linzess) 145 MCG CAPS Take 1 capsule (145 mcg total) by mouth in the morning (Patient not taking: Reported on 11/14/2022) 30 capsule 2   • LORazepam (ATIVAN) 0 5 mg tablet Take 1 tablet (0 5 mg total) by mouth every 8 (eight) hours as needed for anxiety 90 tablet 0   • Meth-Hyo-M Bl-Na Phos-Ph Sal (Uribel) 118 MG CAPS Take 1 each by mouth Three times a day (Patient not taking: Reported on 11/14/2022)     • naloxone (NARCAN) 4 mg/0 1 mL nasal spray Administer 1 spray into a nostril  If no response after 2-3 minutes, give another dose in the other nostril using a new spray  (Patient not taking: No sig reported) 1 each 1     Current Facility-Administered Medications   Medication Dose Route Frequency Provider Last Rate Last Admin   • cyanocobalamin injection 1,000 mcg  1,000 mcg Intramuscular Q30 Days Raegan Jara DO   1,000 mcg at 10/27/22 1049        Allergies: Allergies   Allergen Reactions   • Ace Inhibitors Angioedema and Anaphylaxis     Anaphylaxis   • Hydralazine Other (See Comments)     Lip swelling  Flank pain   • Other Anaphylaxis and Other (See Comments)     Other reaction(s): angioadema  Other reaction(s):  Other (See Comments)  Preservatives- itching throat closes, hi  Other reaction(s): angioadema  E Z Cat - hives throat closes itching,  Preservatives- itching throat closes, hi  Artificial sweeteners   • Valsartan Angioedema     Lips, face swollen   • Ampicillin Hives     Depends on brand some preservatives can react  Has recently tolerated oral amoxicillin  • Benicar [Olmesartan] Angioedema     See Allergy note from 9/11/2008    Swollen ankles/legs   • Sulfa Antibiotics Hives     stuffiness,itching,hives,throat closing       Family History:     Family History   Problem Relation Age of Onset   • Lung cancer Mother 61   • Brain cancer Mother 61   • Diabetes Father    • Depression Father    • Other Father         septic   • Allergies Sister    • Hashimoto's thyroiditis Sister    • Abdominal aortic aneurysm Sister    • Diabetes Sister    • No Known Problems Sister    • No Known Problems Maternal Grandmother    • No Known Problems Maternal Grandfather    • No Known Problems Paternal Grandmother    • No Known Problems Paternal Grandfather    • Hodgkin's lymphoma Brother    • No Known Problems Brother    • No Known Problems Maternal Aunt    • Diabetes Other        Social History:       Social History     Socioeconomic History   • Marital status:      Spouse name: Not on file   • Number of children: 0   • Years of education: Not on file   • Highest education level: Not on file   Occupational History   • Occupation: unemployed   Tobacco Use   • Smoking status: Former Smoker     Packs/day: 0 50     Years: 10 00     Pack years: 5 00     Quit date: 2019     Years since quitting: 3 8   • Smokeless tobacco: Never Used   Vaping Use   • Vaping Use: Never used   Substance and Sexual Activity   • Alcohol use: Never   • Drug use: No   • Sexual activity: Not Currently   Other Topics Concern   • Not on file   Social History Narrative    Who lives in your home: Alone     What type of home do you live in: Apartment     Age of your home: 1952 built     How long have you been living there: 5 yrs     Type of heat: forced hot air     Type of fuel: electric     What type of jamin is in your bedroom: carpet jamin     Do you have the following in or near your home:    Air products: Humidifier in the bedroom/kitchen     Pests: none     Pets: none     Are pets allowed in bedroom: N/A     Open fields, wooded areas nearby: No     Basement: [id number]-no mold     Exposure to second hand smoke: No    Central air     Habits:    Caffeine: Hot tea 1 cup daily- ice tea 1 cup in the afternoon    Chocolate: Occasionally      Social Determinants of Health     Financial Resource Strain: Not on file   Food Insecurity: No Food Insecurity   • Worried About Running Out of Food in the Last Year: Never true   • Ran Out of Food in the Last Year: Never true   Transportation Needs: No Transportation Needs   • Lack of Transportation (Medical): No   • Lack of Transportation (Non-Medical): No   Physical Activity: Not on file   Stress: Not on file   Social Connections: Not on file   Intimate Partner Violence: Not on file   Housing Stability: Low Risk    • Unable to Pay for Housing in the Last Year: No   • Number of Places Lived in the Last Year: 1   • Unstable Housing in the Last Year: No         Objective:       Physical Exam:                                                                 Vitals:            Constitutional:    /66 (BP Location: Left arm, Patient Position: Sitting, Cuff Size: Standard)   Pulse 62   Temp (!) 97 2 °F (36 2 °C) (Tympanic)   Ht 5' 4" (1 626 m)   Wt 88 5 kg (195 lb)   LMP  (LMP Unknown)   BMI 33 47 kg/m²   BP Readings from Last 3 Encounters:   11/14/22 143/66   11/09/22 128/72   10/27/22 124/76     Pulse Readings from Last 3 Encounters:   11/14/22 62   11/09/22 72   10/27/22 58         Well developed, well nourished, well groomed  Psychiatric:  Normal behavior and appropriate affect, in pain, tearful at times, no SI, lights off in the room       Neurological Examination:     Mental status/cognitive function:   Recent and remote memory intact   Attention span and concentration as well as fund of knowledge are appropriate for age  Normal language and spontaneous speech  Cranial Nerves:  II-visual fields full  Fundi poorly visualized due to pupillary constriction  III, IV, VI-Pupils were equal, round, and reactive to light and accommodation  Extraocular movements were full and conjugate without nystagmus  V-facial sensation symmetric  decreased sensation left V1 right not to LT and vibration and currently has a migraine (chronic and normal MRI with this, functional features with vibration being less felt on left versus right forehead)  VII-facial expression symmetric, intact forehead wrinkle, strong eye closure, symmetric smile    VIII-hearing grossly intact bilaterally   IX, X-palate elevation symmetric, no dysarthria  XI-shoulder shrug strength intact    XII-tongue protrusion midline  Motor Exam: symmetric bulk and tone throughout, no pronator drift  Power/strength 5/5 bilateral upper and lower extremities, no atrophy, fasciculations or abnormal movements noted  Sensory: grossly intact light touch in all extremities  Reflexes: brachioradialis 1+, biceps 1+, knee 1+, ankle 1+ bilaterally  No ankle clonus   Coordination: Finger nose finger intact bilaterally, no apparent dysmetria, ataxia or tremor noted  Gait: steady casual and tandem gait  Pertinent lab results:   - 09/21/2022 CMP and CBC unremarkable  09/02/2022 A1c 5 6  06/06/2022 vitamin-D for 3 3  Vitamin B12 1,229  TSH normal     Imaging: I have personally reviewed imaging and radiology read   - MRI brain without contrast 09/01/2022: No evidence of acute infarct, intracranial hemorrhage or mass  - CTA head and neck with without contrast 09/01/2022: 1  No intracranial large vessel occlusion or critical stenosis  No aneurysm    2   Progression of atherosclerotic disease in the left cervical carotid artery with a new linear intraluminal defect at the origin/proximal ICA suggesting an ulcerated plaque resulting in about 80% stenosis of proximal ICA  3   Stable atherosclerotic change of right cervical carotid artery without hemodynamically significant stenosis  - carotid ultrasound 10/07/2022: RIGHT: There is <50% stenosis noted in the internal carotid artery  Plaque is Heterogenous  Vertebral artery flow is antegrade  There is no significant subclavian artery disease  LEFT: There is >70% stenosis noted in the internal carotid artery  Plaque is heterogenous  Vertebral artery flow is antegrade  There is no significant subclavian artery disease   - MRI brain with without contrast 10/20/2018 for headache with facial paresthesias: Scattered anterior bifrontal white matter T2 and FLAIR hyperintense foci which are nonspecific but suspicious for mild chronic microangiopathic changes such as may accompany migraine headaches  Findings are stable  No pathologic enhancement  No acute ischemic disease identified by diffusion imaging        Review of Systems:   ROS obtained by medical assistant Personally reviewed and updated if indicated  I recommended PCP follow up for non neurologic problems  Review of Systems   Constitutional: Negative for appetite change and fever  HENT: Negative  Negative for hearing loss, tinnitus, trouble swallowing and voice change  Eyes: Positive for photophobia  Negative for pain  Respiratory: Negative  Negative for shortness of breath  Cardiovascular: Negative  Negative for palpitations  Gastrointestinal: Positive for nausea  Negative for vomiting  Endocrine: Negative  Negative for cold intolerance  Genitourinary: Negative  Negative for dysuria, frequency and urgency  Musculoskeletal: Negative  Negative for myalgias and neck pain  Skin: Negative  Negative for rash  Neurological: Positive for headaches  Negative for dizziness, tremors, seizures, syncope, facial asymmetry, speech difficulty, weakness, light-headedness and numbness  Hematological: Negative    Does not bruise/bleed easily  Psychiatric/Behavioral: Negative  Negative for confusion, hallucinations and sleep disturbance  I have spent 63 minutes with Patient today in which greater than 50% of this time was spent in counseling/coordination of care regarding Diagnostic results, Prognosis, Risks and benefits of tx options, Intructions for management, Patient and family education, Importance of tx compliance, Risk factor reductions and Impressions  I also spent 25 minutes non face to face for this patient the same day           Author:  Christina Yanez MD 11/14/2022 5:30 PM

## 2022-11-17 ENCOUNTER — TELEPHONE (OUTPATIENT)
Dept: NEUROLOGY | Facility: CLINIC | Age: 62
End: 2022-11-17

## 2022-11-17 NOTE — TELEPHONE ENCOUNTER
Received fax from [facility]  [employer] requires PA  ID [id number]    [employer] PA initiated on [facility]   (Key: [id number])  Unable to attach >20 pages of office notes    Awaiting determination

## 2022-11-17 NOTE — PROGRESS NOTES
[address] [facility]  [facility]  [address] [facility]  [place], [address] [place] [address]  [phone]  www  [facility]  org      NAME: Charanjit Benitez  AGE: 57 y o  SEX: female  : 1960   MRN: [id number]    DATE: 2022  TIME: 10:10 AM    Assessment and Plan:    Interstitial cystitis:  Affected by diet, and she has had extensive workup  I have nothing else to add in terms of investigations  She does not tolerate installations well and she has severe urethral tenderness so any form of instrumentation can trigger a series of events  We wish to avoid these  There is also question of recurrent infections  She is seeing Infectious Disease for this and they recommended not treating what seems to be bacteriuria  It is possible that the bacteria may simply be there and not be the source of her symptoms so I will follow those recommendations  However we can consider methenamine in the future if that becomes a recurrent issue and we wish to get some kind of control from that regard  She will call Infectious Disease if she feels like she has an infection and they will test the urine  With regards to her pain and symptoms, she has not tried hydroxyzine or Prelief  I described the mechanism of hydroxyzine and my believe that interstitial cystitis is actually a type 4 hypersensitivity reaction and we will start her on 25 mg p o  Q h s   She will take Prelief and watch her diet  Follow-up in 3 months for reassessment  DMSO and hydrodistention remains an option, but I wish to avoid any urethral manipulation  Chief Complaint     Chief Complaint   Patient presents with   • New Patient Visit     Patient here today to discuss IC & UTI       History of Present Illness   New patient office visit:  78-year-old woman with a history of possible interstitial cystitis and urinary retention  She has seen several doctors before this    This first diagnosed in the early 2000s-she saw Dr Black and a sounds like she had Hunner's ulcers cauterized  Also had 13 instillations and she did well until 2020  She saw Dr Haile Hair at [facility] office visit was 6/6/2022-she was having recurrent urinary tract infections  Negative cysto  In September tenth 2022, she had something come out of her bladder which looks like it may be crystalline  She does describe what sounds like quite a bit of urethral pain and tenderness  She had a catheter placed in September because she had difficulty urinating and she describes this as being very painful  She also had pain with her instillations recently which were triamcinolone and heparin  Her current symptoms are pain in pressure in the bladder region and a burning sensation in that region  She does have pain with bladder filling  It hurts even more after she is done urinating  Right now she feels a bloated sensation in her bladder region, feels that it is full and she knows that if she went in to urinate right now she would not be able to go but she has to wait a while for the bladder to fill even more  She takes Lasix in the morning and she usually has urinate about 3-4 times  Daytime frequency is variable depending upon how much she drinks  When the pain becomes intolerable she uses a heating pad, tramadol and Pyridium  She does not get up very much at night  Recurrent UTI:  Had greater 100,000 colonies of Enterococcus faecium 7/26/2022 resistance to ampicillin, intermediate to nitrofurantoin, susceptible to vancomycin  Last course of antibiotics was 9/21/2022  She is seeing Infectious Disease  Infectious Disease has said she should not receive more antibiotics  Negative CT scan 9/21/2022  She is finding out that there are dietary triggers such as coffee which she had not had in years actually made her have her symptoms the other morning  Tomatoes also affect it      The following portions of the patient's history were reviewed and updated as appropriate: allergies, current medications, past family history, past medical history, past social history, past surgical history and problem list   Past Medical History:   Diagnosis Date   • A-fib ([facility] ) 03/2020   • Allergic    • Anxiety    • Arthritis    • Asthma    • Colon polyp    • Depression    • GERD (gastroesophageal reflux disease)    • Heart murmur    • Hives    • Hyperlipidemia    • Hypertension    • Infertility, female    • Migraine    • Palpitations    • Psychiatric disorder    • Wears glasses      Past Surgical History:   Procedure Laterality Date   • COLONOSCOPY     • EGD     • EXPLORATORY LAPAROTOMY      for infertility   • HAND SURGERY      Hand excision of tendon cyst   • MT LAP,APPENDECTOMY N/A 5/3/2022    Procedure: APPENDECTOMY LAPAROSCOPIC;  Surgeon: Kelly Novoa MD;  Location: [facility];  Service: General   • SUPERIOR [address] ARTERY STENT     • WISDOM TOOTH EXTRACTION       shoulder  Review of Systems   Review of Systems   Gastrointestinal: Negative  Genitourinary:        As per HPI       Active Problem List     Patient Active Problem List   Diagnosis   • Acute upper respiratory infection   • Allergic rhinitis   • Angina pectoris ([facility] )   • Anxiety and depression   • Arthritis   • Asthma due to seasonal allergies   • Attention disturbance   • Benign essential hypertension   • Interstitial cystitis   • Chronic low back pain   • Familial hyperlipidemia   • Elevated antinuclear antibody (SON) level   • Elevated blood sugar   • Essential hypertension   • Female pelvic pain   • Hyperlipidemia   • Lipid disorder   • Migraines   • Obesity (BMI 30-39  9)   • Tick bite   • Tobacco abuse   • Venous insufficiency   • Anxiety   • Insomnia   • Snoring   • Cervicalgia   • Headache   • AMD (age-related macular degeneration), bilateral   • Allergic reaction   • Palpitations and chest pain     • Intertrigo   • Acute gastric ulcer without hemorrhage or perforation   • Tinea corporis   • Gastroesophageal reflux disease without esophagitis   • Allergic conjunctivitis of both eyes   • Intrinsic atopic dermatitis   • Angio-edema   • Vasomotor rhinitis   • Other chest pain   • Low TSH level   • Paroxysmal atrial fibrillation ([facility])   • Vaginal candidiasis   • WELLINGTON (dyspnea on exertion)   • Hair loss   • Patellar tendinitis of left knee   • Injury of toe on right foot   • Wheezing   • Lumbar radiculopathy   • Chronic fatigue   • Acute pyelonephritis   • CAMILLE (obstructive sleep apnea)   • Hypertensive heart disease with congestive heart failure ([facility])   • Shortness of breath   • Unspecified diastolic (congestive) heart failure ([facility])   • Vitamin B12 deficiency   • Left upper quadrant pain   • Diverticulitis   • 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[facility])   • Hypoxia   • Chronic bronchitis, unspecified chronic bronchitis type ([facility])   • Left arm pain   • Hematoma   • Continuous opioid dependence ([facility])   • COPD (chronic obstructive pulmonary disease) ([facility])   • Moderate persistent asthma without complication   • Urinary retention   • Peripheral vascular disease ([facility] )   • Appendix disease   • S/P appendectomy   • Pain of upper abdomen   • Left upper quadrant abdominal pain   • Recurrent UTI   • Stroke-like symptoms   • Localized swelling of left lower extremity   • Acute CVA (cerebrovascular accident) ([facility] )   • Carotid artery stenosis   • Stenosis of left carotid artery   • Asymptomatic stenosis of left carotid artery   • Premature atrial contractions       Objective   /76   Pulse 61   Ht 5' 4" (1 626 m)   Wt 89 4 kg (197 lb)   LMP  (LMP Unknown)   SpO2 94%   BMI 33 81 kg/m²     Physical Exam  Vitals reviewed  Constitutional:       Appearance: Normal appearance  HENT:      Head: Normocephalic and atraumatic  Eyes:      Extraocular Movements: Extraocular movements intact  Pulmonary:      Effort: Pulmonary effort is normal    Musculoskeletal:         General: Normal range of motion        Cervical back: Normal range of motion  Skin:     Coloration: Skin is not jaundiced or pale  Neurological:      General: No focal deficit present  Mental Status: She is alert and oriented to person, place, and time  Psychiatric:         Mood and Affect: Mood normal          Behavior: Behavior normal          Thought Content:  Thought content normal          Judgment: Judgment normal              Current Medications     Current Outpatient Medications:   •  acetaminophen (TYLENOL) 325 mg tablet, Take 650 mg by mouth as needed for mild pain, Disp: , Rfl:   •  apixaban (Eliquis) 5 mg, Take 1 tablet (5 mg total) by mouth 2 (two) times a day, Disp: 180 tablet, Rfl: 5  •  aspirin 81 mg chewable tablet, Chew 1 tablet (81 mg total) daily, Disp: , Rfl: 0  •  azelastine (OPTIVAR) 0 05 % ophthalmic solution, Administer 1 drop to both eyes 2 (two) times a day, Disp: 6 mL, Rfl: 12  •  butalbital-acetaminophen-caffeine (FIORICET,ESGIC) -40 mg per tablet, Take 1 tablet by mouth every 6 (six) hours as needed for headaches, Disp: 30 tablet, Rfl: 0  •  cetirizine (ZyrTEC) 10 mg tablet, Take 1 tablet (10 mg total) by mouth daily for 365 doses, Disp: 30 tablet, Rfl: 11  •  chlordiazepoxide-clidinium (LIBRAX) 5-2 5 mg per capsule, TAKE 1 CAPSULE BY MOUTH 4 (FOUR) TIMES A DAY (BEFORE MEALS AND AT BEDTIME), Disp: 360 capsule, Rfl: 1  •  chlorhexidine (PERIDEX) 0 12 % solution, , Disp: , Rfl:   •  Cholecalciferol 25 MCG (1000 UT) tablet, Take 1,000 Units by mouth daily, Disp: , Rfl:   •  clotrimazole-betamethasone (LOTRISONE) 1-0 05 % cream, , Disp: , Rfl:   •  diltiazem (DILACOR XR) 120 MG 24 hr capsule, One tablet daily as needed for palpitations, Disp: 30 capsule, Rfl: 5  •  ELDERBERRY PO, Take by mouth daily, Disp: , Rfl:   •  Evolocumab 140 MG/ML SOAJ, Inject 1 mL (140 mg total) under the skin every 14 (fourteen) days, Disp: 2 mL, Rfl: 5  •  fluticasone-vilanterol (BREO ELLIPTA) 200-25 MCG/INH inhaler, Inhale 1 puff daily Rinse mouth after use , Disp: 3 Inhaler, Rfl: 3  •  furosemide (LASIX) 40 mg tablet, TAKE ONE TABLET BY MOUTH ONCE DAILY, Disp: 90 tablet, Rfl: 0  •  Ginger, Zingiber officinalis, (GINGER PO), Take 1 tablet by mouth in the morning, Disp: , Rfl:   •  hydrocortisone-pramoxine (PROCTOFOAM-HC) 1-1 % FOAM rectal foam, Insert 1 applicator into the rectum 2 (two) times a day, Disp: 10 g, Rfl: 2  •  hydrOXYzine HCL (ATARAX) 25 mg tablet, Take 1 tablet (25 mg total) by mouth daily at bedtime, Disp: 30 tablet, Rfl: 11  •  ipratropium (ATROVENT) 0 06 % nasal spray, , Disp: , Rfl:   •  ketoconazole (NIZORAL) 2 % cream, Apply topically daily, Disp: 60 g, Rfl: 2  •  LORazepam (ATIVAN) 0 5 mg tablet, Take 1 tablet (0 5 mg total) by mouth every 8 (eight) hours as needed for anxiety, Disp: 90 tablet, Rfl: 0  •  LORazepam (ATIVAN) 1 mg tablet, Take 1 tablet (1 mg total) by mouth every 8 (eight) hours as needed for anxiety, Disp: 90 tablet, Rfl: 0  •  metoprolol succinate (TOPROL-XL) 100 mg 24 hr tablet, Take 1 tablet (100 mg total) by mouth 2 (two) times a day, Disp: 180 tablet, Rfl: 2  •  montelukast (SINGULAIR) 10 mg tablet, TAKE ONE TABLET BY MOUTH DAILY AT BEDTIME, Disp: 90 tablet, Rfl: 0  •  MULTIPLE VITAMIN PO, Take by mouth, Disp: , Rfl:   •  nystatin (MYCOSTATIN) powder, Apply topically three times a weekly to affected area, Disp: 60 g, Rfl: 3  •  omeprazole (PriLOSEC) 40 MG capsule, Take 1 capsule (40 mg total) by mouth daily, Disp: 309 capsule, Rfl: 4  •  ondansetron (Zofran ODT) 4 mg disintegrating tablet, Take 1 tablet (4 mg total) by mouth every 6 (six) hours as needed for nausea or vomiting, Disp: 60 tablet, Rfl: 2  •  phenazopyridine (PYRIDIUM) 200 mg tablet, Take 1 tablet (200 mg total) by mouth 3 (three) times a day with meals, Disp: 90 tablet, Rfl: 0  •  Probiotic Product (PROBIOTIC-10 PO), Take 1 tablet by mouth in the morning, Disp: , Rfl:   •  spironolactone (ALDACTONE) 25 mg tablet, Take 1 tablet (25 mg total) by mouth 2 (two) times a day, Disp: 180 tablet, Rfl: 0  •  sucralfate (CARAFATE) 1 g tablet, TAKE ONE TABLET BY MOUTH FOUR TIMES DAILY, Disp: 360 tablet, Rfl: 0  •  traMADol (ULTRAM) 50 mg tablet, Take 1 tablet (50 mg total) by mouth 4 (four) times a day as needed for severe pain, Disp: 120 tablet, Rfl: 0  •  triamcinolone acetonide (KENALOG-40) 40 mg/mL, , Disp: , Rfl:   •  Turmeric 500 MG CAPS, , Disp: , Rfl:   •  Zinc 100 MG TABS, Take by mouth daily, Disp: , Rfl:   •  azelastine (OPTIVAR) 0 05 % ophthalmic solution, Administer 1 drop to both eyes 2 (two) times a day (Patient not taking: Reported on 11/18/2022), Disp: 6 mL, Rfl: 12  •  dexlansoprazole (DEXILANT) 60 MG capsule, Take 1 capsule (60 mg total) by mouth daily Please take on an empty stomach 30 minutes before eating (Patient not taking: Reported on 11/14/2022), Disp: 30 capsule, Rfl: 3  •  diltiazem (CARDIZEM) 120 MG tablet, One tablet daily as needed for heart palpitations (Patient not taking: Reported on 11/14/2022), Disp: 30 tablet, Rfl: 5  •  flecainide (TAMBOCOR) 50 mg tablet, Take 1 tablet (50 mg total) by mouth 2 (two) times a day (Patient not taking: Reported on 11/14/2022), Disp: 60 tablet, Rfl: 0  •  Galcanezumab-gnlm 120 MG/ML SOAJ, Inject 120 mg under the skin every 30 (thirty) days Following the first month loading dose of 2 pens  (Patient not taking: Reported on 11/18/2022), Disp: 1 mL, Rfl: 11  •  Heparin Sodium, Porcine, (heparin, porcine,) 71154 UNIT/ML, , Disp: , Rfl:   •  linaCLOtide (Linzess) 145 MCG CAPS, Take 1 capsule (145 mcg total) by mouth in the morning (Patient not taking: Reported on 11/14/2022), Disp: 30 capsule, Rfl: 2  •  Meth-Hyo-M Bl-Na Phos-Ph Sal (Uribel) 118 MG CAPS, Take 1 each by mouth Three times a day (Patient not taking: Reported on 11/14/2022), Disp: , Rfl:   •  naloxone (NARCAN) 4 mg/0 1 mL nasal spray, Administer 1 spray into a nostril   If no response after 2-3 minutes, give another dose in the other nostril using a new spray  (Patient not taking: No sig reported), Disp: 1 each, Rfl: 1    Current Facility-Administered Medications:   •  cyanocobalamin injection 1,000 mcg, 1,000 mcg, Intramuscular, Q30 Days, Noris Santos DO, 1,000 mcg at 10/27/22 1049        Henrry Bower MD

## 2022-11-18 ENCOUNTER — OFFICE VISIT (OUTPATIENT)
Dept: UROLOGY | Facility: MEDICAL CENTER | Age: 62
End: 2022-11-18

## 2022-11-18 ENCOUNTER — TELEPHONE (OUTPATIENT)
Dept: CARDIOLOGY CLINIC | Facility: CLINIC | Age: 62
End: 2022-11-18

## 2022-11-18 VITALS
BODY MASS INDEX: 33.63 KG/M2 | OXYGEN SATURATION: 94 % | WEIGHT: 197 LBS | SYSTOLIC BLOOD PRESSURE: 130 MMHG | DIASTOLIC BLOOD PRESSURE: 76 MMHG | HEIGHT: 64 IN | HEART RATE: 61 BPM

## 2022-11-18 DIAGNOSIS — G43.001 MIGRAINE WITHOUT AURA AND WITH STATUS MIGRAINOSUS, NOT INTRACTABLE: ICD-10-CM

## 2022-11-18 DIAGNOSIS — N30.10 INTERSTITIAL CYSTITIS: ICD-10-CM

## 2022-11-18 RX ORDER — BUTALBITAL, ACETAMINOPHEN AND CAFFEINE 50; 325; 40 MG/1; MG/1; MG/1
1 TABLET ORAL EVERY 6 HOURS PRN
Qty: 90 TABLET | Refills: 0 | Status: SHIPPED | OUTPATIENT
Start: 2022-11-18

## 2022-11-18 RX ORDER — HYDROXYZINE HYDROCHLORIDE 25 MG/1
25 TABLET, FILM COATED ORAL
Qty: 30 TABLET | Refills: 11 | Status: SHIPPED | OUTPATIENT
Start: 2022-11-18

## 2022-11-18 NOTE — PATIENT INSTRUCTIONS
Take the hydroxyzine as prescribed  You may increase this to 50 mg at night if you are able to  Remember it may make you groggy  This will get better over time  Take Prelief over-the-counter which you can get at most drug stores and take this with meals

## 2022-11-18 NOTE — TELEPHONE ENCOUNTER
Patient called  You saw in consult 11/9/22 for afib  You started her on Flecainide 50mg to increase to 100mg  She took one dose of Flecainide which significantly increased her HR/palpitations and caused some SOB  She d/c'd after that dose and did not retry  She has an echo scheduled for 11/29/22 and a follow-up with you on 12/14/22  She has never had ablation  Last stress test was 2020  Referred by Dr Denita Salazar

## 2022-11-21 NOTE — TELEPHONE ENCOUNTER
I spoke with patient about holding off on any new meds until her next OV with you and I did send your message to Sulema Mckoy to price check for Multaq

## 2022-11-22 ENCOUNTER — TELEPHONE (OUTPATIENT)
Dept: INFECTIOUS DISEASES | Facility: CLINIC | Age: 62
End: 2022-11-22

## 2022-11-22 DIAGNOSIS — I10 ESSENTIAL HYPERTENSION: ICD-10-CM

## 2022-11-22 DIAGNOSIS — N39.0 RECURRENT UTI: Primary | ICD-10-CM

## 2022-11-22 RX ORDER — METOPROLOL SUCCINATE 100 MG/1
TABLET, EXTENDED RELEASE ORAL
Qty: 180 TABLET | Refills: 0 | Status: SHIPPED | OUTPATIENT
Start: 2022-11-22

## 2022-11-22 NOTE — TELEPHONE ENCOUNTER
Pt calls with concerns of a UTI  She states that last she saw Dr Ulises Leija that if she had symptoms again he would order urine culture  Current symptoms are pain and burning going up the back, strong smelling, burning when urinating

## 2022-11-28 ENCOUNTER — OFFICE VISIT (OUTPATIENT)
Dept: FAMILY MEDICINE CLINIC | Facility: CLINIC | Age: 62
End: 2022-11-28

## 2022-11-28 VITALS
WEIGHT: 198.8 LBS | BODY MASS INDEX: 33.94 KG/M2 | OXYGEN SATURATION: 96 % | HEART RATE: 86 BPM | HEIGHT: 64 IN | SYSTOLIC BLOOD PRESSURE: 128 MMHG | DIASTOLIC BLOOD PRESSURE: 78 MMHG | TEMPERATURE: 97.6 F

## 2022-11-28 DIAGNOSIS — K55.069 MESENTERIC ARTERY THROMBOSIS (HCC): ICD-10-CM

## 2022-11-28 DIAGNOSIS — I65.22 ASYMPTOMATIC STENOSIS OF LEFT CAROTID ARTERY: ICD-10-CM

## 2022-11-28 DIAGNOSIS — M79.602 LEFT ARM PAIN: ICD-10-CM

## 2022-11-28 DIAGNOSIS — E78.49 FAMILIAL HYPERLIPIDEMIA: ICD-10-CM

## 2022-11-28 DIAGNOSIS — E53.8 VITAMIN B12 DEFICIENCY: Primary | ICD-10-CM

## 2022-11-28 DIAGNOSIS — I48.0 PAROXYSMAL ATRIAL FIBRILLATION (HCC): ICD-10-CM

## 2022-11-28 DIAGNOSIS — I10 ESSENTIAL HYPERTENSION: ICD-10-CM

## 2022-11-28 RX ORDER — TRAMADOL HYDROCHLORIDE 50 MG/1
50 TABLET ORAL 4 TIMES DAILY PRN
Qty: 120 TABLET | Refills: 3 | Status: SHIPPED | OUTPATIENT
Start: 2022-11-28

## 2022-11-28 RX ORDER — CYANOCOBALAMIN 1000 UG/ML
1000 INJECTION, SOLUTION INTRAMUSCULAR; SUBCUTANEOUS
Status: SHIPPED | OUTPATIENT
Start: 2022-11-28

## 2022-11-28 RX ADMIN — CYANOCOBALAMIN 1000 MCG: 1000 INJECTION, SOLUTION INTRAMUSCULAR; SUBCUTANEOUS at 10:17

## 2022-11-28 NOTE — PROGRESS NOTES
Assessment/Plan:Labs and carotid duplex reviewed  Patient did see Cardiology and has echo scheduled for tomorrow  Patient is seeing vascular it and this week for carotid stenosis on  Continue with current regimen for Afib as well as hypertension  As well as hyperlipidemia  Patient will continue with Rosalind Officer for hyperlipidemic state  Refills given at this time  Patient will have other refills given when needed  Follow-up in 3 months or as needed  Diagnoses and all orders for this visit:    Essential hypertension    Left arm pain  -     traMADol (ULTRAM) 50 mg tablet; Take 1 tablet (50 mg total) by mouth 4 (four) times a day as needed for severe pain    Paroxysmal atrial fibrillation (HCC)    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HCC)    Familial hyperlipidemia    Asymptomatic stenosis of left carotid artery            Subjective:        Patient ID: Val Shrestha is a 57 y o  female  Patient is here to follow-up on Afib, hypertension hyperlipidemia with his injury mesenteric artery thrombosis and carotid stenosis on the left  Patient with chronic abdominal pain  Patient with some shortness of breath  Patient palpitations  Patient did see Cardiology recently  Patient will be seeing vascular 19 this week  Echocardiogram tomorrow  Patient could not tolerate flecainide  The following portions of the patient's history were reviewed and updated as appropriate: allergies, current medications, past family history, past medical history, past social history, past surgical history and problem list       Review of Systems   Constitutional: Negative  HENT: Negative  Eyes: Negative  Respiratory: Positive for shortness of breath  Cardiovascular: Positive for palpitations  Gastrointestinal: Positive for abdominal pain  Endocrine: Negative  Genitourinary: Negative  Musculoskeletal: Negative  Skin: Negative  Allergic/Immunologic: Negative  Neurological: Negative  Hematological: Negative  Psychiatric/Behavioral: Negative  Objective:               /78 (BP Location: Left arm, Patient Position: Sitting, Cuff Size: Standard)   Pulse 86   Temp 97 6 °F (36 4 °C) (Temporal)   Ht 5' 4" (1 626 m)   Wt 90 2 kg (198 lb 12 8 oz)   LMP  (LMP Unknown)   SpO2 96%   BMI 34 12 kg/m²          Physical Exam  Vitals and nursing note reviewed  Constitutional:       General: She is not in acute distress  Appearance: Normal appearance  She is not ill-appearing, toxic-appearing or diaphoretic  HENT:      Head: Normocephalic and atraumatic  Right Ear: Tympanic membrane, ear canal and external ear normal  There is no impacted cerumen  Left Ear: Tympanic membrane, ear canal and external ear normal  There is no impacted cerumen  Nose: Nose normal  No congestion or rhinorrhea  Mouth/Throat:      Mouth: Mucous membranes are moist       Pharynx: No oropharyngeal exudate or posterior oropharyngeal erythema  Eyes:      General: No scleral icterus  Right eye: No discharge  Left eye: No discharge  Extraocular Movements: Extraocular movements intact  Conjunctiva/sclera: Conjunctivae normal       Pupils: Pupils are equal, round, and reactive to light  Neck:      Vascular: Carotid bruit present  Cardiovascular:      Rate and Rhythm: Normal rate  Rhythm irregular  Pulses: Normal pulses  Heart sounds: Normal heart sounds  No murmur heard  No friction rub  No gallop  Pulmonary:      Effort: Pulmonary effort is normal  No respiratory distress  Breath sounds: Normal breath sounds  No stridor  No wheezing, rhonchi or rales  Chest:      Chest wall: No tenderness  Musculoskeletal:         General: No swelling, tenderness, deformity or signs of injury  Normal range of motion  Cervical back: Normal range of motion and neck supple  No rigidity  No muscular tenderness  Right lower leg: No edema        Left lower leg: No edema  Lymphadenopathy:      Cervical: No cervical adenopathy  Skin:     General: Skin is warm and dry  Capillary Refill: Capillary refill takes less than 2 seconds  Coloration: Skin is not jaundiced  Findings: No bruising, erythema, lesion or rash  Neurological:      Mental Status: She is alert and oriented to person, place, and time  Mental status is at baseline  Cranial Nerves: No cranial nerve deficit  Sensory: No sensory deficit  Motor: No weakness  Coordination: Coordination normal       Gait: Gait normal    Psychiatric:         Mood and Affect: Mood normal          Behavior: Behavior normal          Thought Content:  Thought content normal          Judgment: Judgment normal

## 2022-11-29 ENCOUNTER — APPOINTMENT (OUTPATIENT)
Dept: LAB | Facility: HOSPITAL | Age: 62
End: 2022-11-29

## 2022-11-29 ENCOUNTER — HOSPITAL ENCOUNTER (OUTPATIENT)
Dept: NON INVASIVE DIAGNOSTICS | Facility: HOSPITAL | Age: 62
End: 2022-11-29
Attending: INTERNAL MEDICINE

## 2022-11-29 DIAGNOSIS — N39.0 RECURRENT UTI: ICD-10-CM

## 2022-11-29 DIAGNOSIS — G43.709 CHRONIC MIGRAINE WITHOUT AURA WITHOUT STATUS MIGRAINOSUS, NOT INTRACTABLE: Primary | ICD-10-CM

## 2022-11-29 LAB
BACTERIA UR QL AUTO: ABNORMAL /HPF
BILIRUB UR QL STRIP: NEGATIVE
CLARITY UR: CLEAR
COLOR UR: ABNORMAL
GLUCOSE UR STRIP-MCNC: ABNORMAL MG/DL
HGB UR QL STRIP.AUTO: ABNORMAL
KETONES UR STRIP-MCNC: NEGATIVE MG/DL
LEUKOCYTE ESTERASE UR QL STRIP: ABNORMAL
NITRITE UR QL STRIP: POSITIVE
NON-SQ EPI CELLS URNS QL MICRO: ABNORMAL /HPF
OTHER STN SPEC: ABNORMAL
PH UR STRIP.AUTO: 5.5 [PH]
PROT UR STRIP-MCNC: NEGATIVE MG/DL
RBC #/AREA URNS AUTO: ABNORMAL /HPF
SP GR UR STRIP.AUTO: 1.01 (ref 1–1.03)
UROBILINOGEN UR QL STRIP.AUTO: 1 E.U./DL
WBC #/AREA URNS AUTO: ABNORMAL /HPF

## 2022-11-29 NOTE — TELEPHONE ENCOUNTER
Received the following vm:    Hi, my name is Jenniffer Capps YOB: 1970, and I saw Dr Eugenio Brumfield a week and a half ago and we talked about weaning off the fioricet, which is great  And I did get the application for the new medication the shot  And that was okay  But now I have a text message saying that it was discontinued for some reason  So if somebody could please talk to me about this, what's going on  I would appreciate it  Thank you so much  [phone]  I still would like to know how to lean off the fioricet  Ok  Thank you  Bye bye      Cb#: [phone]

## 2022-11-30 ENCOUNTER — TELEPHONE (OUTPATIENT)
Dept: INFECTIOUS DISEASES | Facility: CLINIC | Age: 62
End: 2022-11-30

## 2022-11-30 DIAGNOSIS — N30.10 INTERSTITIAL CYSTITIS: ICD-10-CM

## 2022-11-30 RX ORDER — HYDROXYZINE HYDROCHLORIDE 25 MG/1
50 TABLET, FILM COATED ORAL
Qty: 90 TABLET | Refills: 3 | Status: SHIPPED | OUTPATIENT
Start: 2022-11-30 | End: 2023-05-26

## 2022-11-30 NOTE — TELEPHONE ENCOUNTER
If she feels comfortable now, she can slowly wean by taking less often daily, over the next days and weeks, trying to take no more than 3 times a day at first, then 2 times a day, then 1 and then stopping as tolerated  If not able to right now, can start once emgality on board

## 2022-11-30 NOTE — TELEPHONE ENCOUNTER
Urine culture results reviewed which demonstrate growth of Proteus mirabilis, but susceptibility results are still pending  She reports symptoms over 2 weeks including urine with strong smell, dysuria, lower abdominal pain, urine frequency, fatigue and low back pain  She denies fever, chills, vomiting, flank pain  As pt's symptoms have been stable and she has no fever, pt would prefer for susceptibility results to return prior to initiating antibiotic therapy      ----- Message from Marquita Hernandez sent at 11/29/2022  9:59 AM EST -----  Regarding: FW: Urine sample  Contact: [phone]    ----- Message -----  From: Jeramy Brody "Korin Wilkinson"  Sent: 11/29/2022   9:35 AM EST  To: Infectious Disease [place] [facility]  Subject: Urine sample                                     Hyun Ellis  I just wanted to let you know that I took the urine sample down this morning I was having severe pain and the urine was cloudy and strong smell  And I felt like I was bearing down to have a child for the urine to come out  So just to let you know there were a couple creatures floating around in there not quite sure what it is but I'm looking forward to finding out  Because this wasn't the first time this had happened  Thank you for your help

## 2022-11-30 NOTE — TELEPHONE ENCOUNTER
Per chart, it looks like PCP dc'd maintenance dose at recent appt  Called pt, she has not started emgality yet, it should arrive Friday  Made her aware of above and that we would send another script for maintenance dose to pharm  Script entered  Please sign off    She is asking how to wean off fioricet and when to start  Still taking fioricet 3-4 times a day, every day  Sometimes 2 times a day but the majority of the time it is 3-4 times a day    Please advise    [phone] to leave detailed message

## 2022-12-01 LAB — BACTERIA UR CULT: ABNORMAL

## 2022-12-01 NOTE — TELEPHONE ENCOUNTER
Called and advised pt of all of the below  She verbalized clear understanding of all instructions  Pt states that emgality is being delivered today  She plan to start tmrw

## 2022-12-02 ENCOUNTER — TELEPHONE (OUTPATIENT)
Dept: INFECTIOUS DISEASES | Facility: CLINIC | Age: 62
End: 2022-12-02

## 2022-12-02 DIAGNOSIS — N39.0 RECURRENT UTI: Primary | ICD-10-CM

## 2022-12-02 RX ORDER — CEFADROXIL 500 MG/1
500 CAPSULE ORAL EVERY 12 HOURS SCHEDULED
Qty: 14 CAPSULE | Refills: 0 | Status: SHIPPED | OUTPATIENT
Start: 2022-12-02 | End: 2022-12-09

## 2022-12-02 NOTE — TELEPHONE ENCOUNTER
Urine cx results reviewed with growth of Proteus mirabilis  Pt reports symptoms are unchanged and have not improved  Will prescribe a 7 day course of cefadroxil

## 2022-12-02 NOTE — TELEPHONE ENCOUNTER
Patient calls back that the pain is worse and now she has nausea/vomiting  She would like a call back ASAP and does not want to wait much longer as it's almost the weekend

## 2022-12-02 NOTE — TELEPHONE ENCOUNTER
Patient calls our office to let us know that despite your recent conversation, the pain has been traveling up her left thigh  She would prefer not to wait for treatment until culture returns  Looks like it's final at this point       [phone]

## 2022-12-06 ENCOUNTER — TELEPHONE (OUTPATIENT)
Dept: CARDIOLOGY CLINIC | Facility: CLINIC | Age: 62
End: 2022-12-06

## 2022-12-06 NOTE — TELEPHONE ENCOUNTER
Patient has an appointment on 12/14 with Dr Bennett  Patient wanted to know if she will need another echo  Patient recently had one on 9/2/22   Please advise, thank you

## 2022-12-08 DIAGNOSIS — F41.9 ANXIETY: ICD-10-CM

## 2022-12-08 RX ORDER — LORAZEPAM 1 MG/1
1 TABLET ORAL EVERY 8 HOURS PRN
Qty: 90 TABLET | Refills: 0 | Status: SHIPPED | OUTPATIENT
Start: 2022-12-08

## 2022-12-12 ENCOUNTER — TELEPHONE (OUTPATIENT)
Dept: VASCULAR SURGERY | Facility: CLINIC | Age: 62
End: 2022-12-12

## 2022-12-12 RX ORDER — DILTIAZEM HYDROCHLORIDE 120 MG/1
CAPSULE, COATED, EXTENDED RELEASE ORAL
COMMUNITY
Start: 2022-11-23 | End: 2023-01-26

## 2022-12-12 NOTE — TELEPHONE ENCOUNTER
Patient called and asked to move up her appointment sooner because she is not feeling well  In her left ear, she keeps hearing her pulse loudly and she gets a "rush" after eating and makes her nauseous  She said she knows she has afib, but her heart pounds after eating and she feels sick  She said she has a small weight loss  Sent call to scheduling to move up appt with dr Hallie Juárez

## 2022-12-13 ENCOUNTER — OFFICE VISIT (OUTPATIENT)
Dept: FAMILY MEDICINE CLINIC | Facility: CLINIC | Age: 62
End: 2022-12-13

## 2022-12-13 VITALS
DIASTOLIC BLOOD PRESSURE: 82 MMHG | TEMPERATURE: 98.2 F | BODY MASS INDEX: 33.77 KG/M2 | SYSTOLIC BLOOD PRESSURE: 108 MMHG | HEIGHT: 64 IN | WEIGHT: 197.8 LBS | HEART RATE: 66 BPM | OXYGEN SATURATION: 95 %

## 2022-12-13 DIAGNOSIS — J06.9 ACUTE UPPER RESPIRATORY INFECTION: Primary | ICD-10-CM

## 2022-12-13 RX ORDER — AZITHROMYCIN 250 MG/1
TABLET, FILM COATED ORAL
Qty: 6 TABLET | Refills: 0 | Status: SHIPPED | OUTPATIENT
Start: 2022-12-13 | End: 2022-12-17

## 2022-12-13 NOTE — PROGRESS NOTES
Assessment/Plan: Patient will continue with supportive care  Viral testing done  Patient use Z-Odin as directed       Diagnoses and all orders for this visit:    Acute upper respiratory infection  -     azithromycin (ZITHROMAX) 250 mg tablet; Take 2 tablets today then 1 tablet daily x 4 days            Subjective:        Patient ID: Andres Byrd is a 57 y o  female  Patient is here with sweating and congestion since Friday  Patient with nausea along with sore throat rhinorrhea since that time also  Patient also with body aches  Patient also with neck ache  Patient with pressure over her sinuses  No COVID testing done  No medication used Tylenol and Mucinex  The following portions of the patient's history were reviewed and updated as appropriate: allergies, current medications, past family history, past medical history, past social history, past surgical history and problem list       Review of Systems   Constitutional: Positive for chills  HENT: Positive for congestion, postnasal drip, rhinorrhea, sinus pressure, sinus pain and sore throat  Eyes: Negative  Respiratory: Positive for cough  Cardiovascular: Negative  Gastrointestinal: Positive for nausea  Endocrine: Negative  Genitourinary: Negative  Musculoskeletal: Positive for arthralgias and neck pain  Skin: Negative  Allergic/Immunologic: Negative  Neurological: Negative  Hematological: Negative  Psychiatric/Behavioral: Negative  Objective:               /82 (BP Location: Right arm, Patient Position: Sitting, Cuff Size: Large)   Pulse 66   Temp 98 2 °F (36 8 °C) (Temporal)   Ht 5' 4" (1 626 m)   Wt 89 7 kg (197 lb 12 8 oz)   LMP  (LMP Unknown)   SpO2 95%   BMI 33 95 kg/m²          Physical Exam  Vitals and nursing note reviewed  Constitutional:       General: She is not in acute distress  Appearance: She is ill-appearing  She is not toxic-appearing or diaphoretic     HENT: Head: Normocephalic and atraumatic  Right Ear: Tympanic membrane, ear canal and external ear normal  There is no impacted cerumen  Left Ear: Tympanic membrane, ear canal and external ear normal  There is no impacted cerumen  Nose: Nose normal  No congestion or rhinorrhea  Mouth/Throat:      Mouth: Mucous membranes are moist       Pharynx: Oropharyngeal exudate present  No posterior oropharyngeal erythema  Eyes:      General: No scleral icterus  Right eye: No discharge  Left eye: No discharge  Extraocular Movements: Extraocular movements intact  Conjunctiva/sclera: Conjunctivae normal       Pupils: Pupils are equal, round, and reactive to light  Neck:      Vascular: No carotid bruit  Cardiovascular:      Rate and Rhythm: Normal rate and regular rhythm  Pulses: Normal pulses  Heart sounds: Normal heart sounds  No murmur heard  No friction rub  No gallop  Pulmonary:      Effort: Pulmonary effort is normal  No respiratory distress  Breath sounds: Normal breath sounds  No stridor  No wheezing, rhonchi or rales  Chest:      Chest wall: No tenderness  Musculoskeletal:         General: No swelling, tenderness, deformity or signs of injury  Normal range of motion  Cervical back: Normal range of motion and neck supple  No rigidity  No muscular tenderness  Right lower leg: No edema  Left lower leg: No edema  Lymphadenopathy:      Cervical: No cervical adenopathy  Skin:     General: Skin is warm  Capillary Refill: Capillary refill takes less than 2 seconds  Coloration: Skin is not jaundiced  Findings: No bruising, erythema, lesion or rash  Neurological:      Mental Status: She is alert and oriented to person, place, and time  Mental status is at baseline  Cranial Nerves: No cranial nerve deficit  Sensory: No sensory deficit  Motor: No weakness        Coordination: Coordination normal       Gait: Gait normal    Psychiatric:         Mood and Affect: Mood normal          Behavior: Behavior normal          Thought Content:  Thought content normal          Judgment: Judgment normal

## 2022-12-14 ENCOUNTER — TELEPHONE (OUTPATIENT)
Dept: FAMILY MEDICINE CLINIC | Facility: CLINIC | Age: 62
End: 2022-12-14

## 2022-12-14 DIAGNOSIS — G47.33 OSA (OBSTRUCTIVE SLEEP APNEA): Primary | ICD-10-CM

## 2022-12-14 LAB
FLUAV RNA RESP QL NAA+PROBE: NEGATIVE
FLUBV RNA RESP QL NAA+PROBE: NEGATIVE
SARS-COV-2 RNA RESP QL NAA+PROBE: NEGATIVE

## 2022-12-14 NOTE — TELEPHONE ENCOUNTER
AMARA FROM SLEEP DR NEEDS A DR TO DR ROBLES PUT IN FOR SLEEP AMBULATORY SLEEP MEDICINE- (NOT A SLEEP STUDY) DX CODES ARE G47 33, G47 09, G25 81, R40 0  APPT IS ON 12/27/22  AMARA WILL CHECK IN Swrve FOR THE REF   AND SHE CAN BE REACHED [phone] WITH ANY QUESTIONS

## 2022-12-15 ENCOUNTER — OFFICE VISIT (OUTPATIENT)
Dept: DERMATOLOGY | Facility: CLINIC | Age: 62
End: 2022-12-15

## 2022-12-15 VITALS — WEIGHT: 197.6 LBS | BODY MASS INDEX: 33.73 KG/M2 | HEIGHT: 64 IN

## 2022-12-15 DIAGNOSIS — L30.4 INTERTRIGO: Primary | ICD-10-CM

## 2022-12-15 RX ORDER — DESONIDE 0.5 MG/G
OINTMENT TOPICAL 2 TIMES DAILY
Qty: 60 G | Refills: 1 | Status: SHIPPED | OUTPATIENT
Start: 2022-12-15

## 2022-12-15 NOTE — PROGRESS NOTES
[facility] Note     Patient Name: Shamika Viveros  Encounter Date: 12/15/22     Have you been cared for by a [place] [address] Dermatologist in the last 3 years and, if so, which description applies to you? Yes  I have been here within the last 3 years, and my medical history has NOT changed since that time  I am FEMALE/of child-bearing potential     REVIEW OF SYSTEMS:  Have you recently had or currently have any of the following? · No changes in my recent health  PAST MEDICAL HISTORY:  Have you personally ever had or currently have any of the following? If "YES," then please provide more detail  · No changes in my medical history  FAMILY HISTORY:  Any "first degree relatives" (parent, brother, sister, or child) with the following? • No changes in my family's known health  PATIENT EXPERIENCE:    • Do you want the Dermatologist to perform a COMPLETE skin exam today including a clinical examination under the "bra and underwear" areas? NO  • If necessary, do we have your permission to call and leave a detailed message on your Preferred Phone number that includes your specific medical information? Yes      Allergies   Allergen Reactions   • Ace Inhibitors Angioedema and Anaphylaxis     Anaphylaxis   • Hydralazine Other (See Comments)     Lip swelling  Flank pain   • Other Anaphylaxis and Other (See Comments)     Other reaction(s): angioadema  Other reaction(s): Other (See Comments)  Preservatives- itching throat closes, hi  Other reaction(s): angioadema  E Z Cat - hives throat closes itching,  Preservatives- itching throat closes, hi  Artificial sweeteners   • Valsartan Angioedema     Lips, face swollen   • Ampicillin Hives     Depends on brand some preservatives can react  Has recently tolerated oral amoxicillin  • Benicar [Olmesartan] Angioedema     See Allergy note from 9/11/2008    Swollen ankles/legs   • Sulfa Antibiotics Hives     stuffiness,itching,hives,throat closing      Current Outpatient Medications:   •  acetaminophen (TYLENOL) 325 mg tablet, Take 650 mg by mouth as needed for mild pain, Disp: , Rfl:   •  apixaban (Eliquis) 5 mg, Take 1 tablet (5 mg total) by mouth 2 (two) times a day, Disp: 180 tablet, Rfl: 5  •  aspirin 81 mg chewable tablet, Chew 1 tablet (81 mg total) daily, Disp: , Rfl: 0  •  azelastine (OPTIVAR) 0 05 % ophthalmic solution, Administer 1 drop to both eyes 2 (two) times a day, Disp: 6 mL, Rfl: 12  •  azithromycin (ZITHROMAX) 250 mg tablet, Take 2 tablets today then 1 tablet daily x 4 days, Disp: 6 tablet, Rfl: 0  •  butalbital-acetaminophen-caffeine (FIORICET,ESGIC) -40 mg per tablet, Take 1 tablet by mouth every 6 (six) hours as needed for headaches, Disp: 90 tablet, Rfl: 0  •  chlordiazepoxide-clidinium (LIBRAX) 5-2 5 mg per capsule, TAKE 1 CAPSULE BY MOUTH 4 (FOUR) TIMES A DAY (BEFORE MEALS AND AT BEDTIME), Disp: 360 capsule, Rfl: 1  •  chlorhexidine (PERIDEX) 0 12 % solution, , Disp: , Rfl:   •  Cholecalciferol 25 MCG (1000 UT) tablet, Take 1,000 Units by mouth daily, Disp: , Rfl:   •  clotrimazole-betamethasone (LOTRISONE) 1-0 05 % cream, , Disp: , Rfl:   •  diltiazem (DILACOR XR) 120 MG 24 hr capsule, One tablet daily as needed for palpitations, Disp: 30 capsule, Rfl: 5  •  Evolocumab 140 MG/ML SOAJ, Inject 1 mL (140 mg total) under the skin every 14 (fourteen) days, Disp: 2 mL, Rfl: 5  •  fluticasone-vilanterol (BREO ELLIPTA) 200-25 MCG/INH inhaler, Inhale 1 puff daily Rinse mouth after use , Disp: 3 Inhaler, Rfl: 3  •  furosemide (LASIX) 40 mg tablet, Take 1 tablet (40 mg total) by mouth daily, Disp: 90 tablet, Rfl: 0  •  Galcanezumab-gnlm 120 MG/ML SOAJ, Inject 120 mg under the skin every 30 (thirty) days, Disp: 1 mL, Rfl: 11  •  Ginger, Zingiber officinalis, (GINGER PO), Take 1 tablet by mouth in the morning, Disp: , Rfl:   •  hydrOXYzine HCL (ATARAX) 25 mg tablet, Take 2 tablets (50 mg total) by mouth daily at bedtime, Disp: 90 tablet, Rfl: 3  • ipratropium (ATROVENT) 0 06 % nasal spray, , Disp: , Rfl:   •  LORazepam (ATIVAN) 1 mg tablet, Take 1 tablet (1 mg total) by mouth every 8 (eight) hours as needed for anxiety, Disp: 90 tablet, Rfl: 0  •  metoprolol succinate (TOPROL-XL) 100 mg 24 hr tablet, TAKE ONE TABLET BY MOUTH TWICE DAILY, Disp: 180 tablet, Rfl: 0  •  montelukast (SINGULAIR) 10 mg tablet, TAKE ONE TABLET BY MOUTH DAILY AT BEDTIME, Disp: 90 tablet, Rfl: 0  •  MULTIPLE VITAMIN PO, Take by mouth, Disp: , Rfl:   •  nystatin (MYCOSTATIN) powder, Apply topically three times a weekly to affected area, Disp: 60 g, Rfl: 3  •  omeprazole (PriLOSEC) 40 MG capsule, Take 1 capsule (40 mg total) by mouth daily, Disp: 309 capsule, Rfl: 4  •  ondansetron (Zofran ODT) 4 mg disintegrating tablet, Take 1 tablet (4 mg total) by mouth every 6 (six) hours as needed for nausea or vomiting, Disp: 60 tablet, Rfl: 2  •  phenazopyridine (PYRIDIUM) 200 mg tablet, Take 1 tablet (200 mg total) by mouth 3 (three) times a day with meals, Disp: 90 tablet, Rfl: 0  •  Probiotic Product (PROBIOTIC-10 PO), Take 1 tablet by mouth in the morning, Disp: , Rfl:   •  spironolactone (ALDACTONE) 25 mg tablet, Take 1 tablet (25 mg total) by mouth 2 (two) times a day, Disp: 180 tablet, Rfl: 0  •  sucralfate (CARAFATE) 1 g tablet, TAKE ONE TABLET BY MOUTH FOUR TIMES DAILY, Disp: 360 tablet, Rfl: 0  •  traMADol (ULTRAM) 50 mg tablet, Take 1 tablet (50 mg total) by mouth 4 (four) times a day as needed for severe pain, Disp: 120 tablet, Rfl: 3  •  Turmeric 500 MG CAPS, , Disp: , Rfl:   •  Zinc 100 MG TABS, Take by mouth daily, Disp: , Rfl:   •  cetirizine (ZyrTEC) 10 mg tablet, Take 1 tablet (10 mg total) by mouth daily for 365 doses, Disp: 30 tablet, Rfl: 11  •  diltiazem (CARDIZEM CD) 120 mg 24 hr capsule, , Disp: , Rfl:   •  LORazepam (ATIVAN) 0 5 mg tablet, Take 1 tablet (0 5 mg total) by mouth every 8 (eight) hours as needed for anxiety (Patient not taking: Reported on 12/15/2022), Disp: 90 tablet, Rfl: 0  •  triamcinolone acetonide (KENALOG-40) 40 mg/mL, , Disp: , Rfl:     Current Facility-Administered Medications:   •  cyanocobalamin injection 1,000 mcg, 1,000 mcg, Intramuscular, Q30 Days, Raf Krishna DO, 1,000 mcg at 10/27/22 1049  •  cyanocobalamin injection 1,000 mcg, 1,000 mcg, Intramuscular, Q30 Days, Raf Krishna DO, 1,000 mcg at 11/28/22 1017          • Whom besides the patient is providing clinical information about today's encounter?   o NO ADDITIONAL HISTORIAN (patient alone provided history)    Physical Exam and Assessment/Plan by Diagnosis:    UNDER BREASTS: INTERTRIGO/IRRITANT CONTACT DERMATITIS  RIGHT POPLITEAL FOSSA: ECZEMATOUS PROCESS  Physical Exam:  • Anatomic Location Affected:  Under breasts, right popliteal fossa  • Morphological Description:  Poorly demarcated pink plaques (mild)  • Pertinent Positives:  • Pertinent Negatives: Additional History of Present Condition:  Pt has been using Desonide ointment in the fold of her breast  Pt states she has not been using the Ketoconazole 2% cream and nyastin powder  Pt states that the spots are itchy  Assessment and Plan:  Based on a thorough discussion of this condition and the management approach to it (including a comprehensive discussion of the known risks, side effects and potential benefits of treatment), the patient (family) agrees to implement the following specific plan:  • Apply OTC Vaseline ointment daily to areas  • If itchy/red, can apply desonide 0 05% ointment 2x/day until resolves (no longer than 1 week at a time)  Avoid face and groin    Assessment and Plan  Intertrigo describes a rash in the flexures or body folds, such as behind the ears, in the folds of the neck, under the arms (axillae), under a protruding abdomen, in the groin, between the buttocks, in the finger webs or toe spaces  Although intertrigo may affect one skin fold, it is common for it to involve multiple sites      Intertrigo can affect males and females of any age  It is particularly common in people that are overweight or obese (see metabolic syndrome)  Other contributing factors are:  • Genetic tendency to skin disease  • Hyperhidrosis (excessive sweating)    Intertrigo can be acute (recent onset), relapsing (recurrent), or chronic (present for more than 6 weeks)  The exact appearance and behaviour depends on the underlying cause or causes  The skin affected by intertrigo is inflamed, ie reddened and uncomfortable  It may become moist and macerated, leading to fissuring (cracks) and peeling  Intertrigo is due to genetic and environmental factors  • Flexural skin has relatively high surface temperature  • Moisture from insensible water loss and sweating cannot evaporate due to occlusion  • Friction from movement of adjacent skin results in chafing  The microorganisms that are normally resident on flexural skin, the microbiome, include corynebacteria, other bacteria and yeasts  These multiply in warm moist environments and may cause disease  We can classify intertrigo into infectious and inflammatory origin but there is often overlap  • Infections tend to be unilateral and asymmetrical   • Inflammatory disorders tend to be symmetrical affecting armpits, groins, under the breasts and the abdominal folds, except atopic dermatitis, which more often arises on the neck, and in elbow and knee creases  Investigations may be necessary to determine the cause of intertrigo  • A swab for microscopy and culture of bacteria (microbiology)  • A scraping for microscopy and culture of fungi (mycology)  • A skin biopsy may be performed for histopathology if the skin condition is unusual or fails to respond to treatment  What is the treatment for intertrigo? Treatment depends on the underlying cause, if identified, and on which micro-organisms are present in the rash  Combinations are common    • Sweating may be reduced with a gentle antiperspirant  • Physical exertion should be followed by a bath and completely drying the skin folds using a hair dryer on cool setting, soft towel and/or corn starch powder  • Triple paste contains petrolatum, zinc oxide, and aluminum acetate solution to reduce friction, irritation and sweating  • Bacteria may be treated with topical antibiotics such as fusidic acid cream, mupirocin ointment, or oral antibiotics such as flucloxacillin and erythromycin  • Yeasts and fungi may be treated with topical antifungals such as clotrimazole and terbinafine cream or oral antifungal agents such as itraconazole or terbinafine  • Inflammatory skin diseases are often treated with low potency topical steroid creams such as hydrocortisone  More potent steroids are usually avoided in the flexures because they may cause skin thinning resulting in stretch marks (striae) and even ulcers  Calcineurin inhibitors such as tacrolimus ointment or pimecrolimus cream may also prove effective            Scribe Attestation    I,:  Fabiola Jackson am acting as a scribe while in the presence of the attending physician :       I,:  Kelly Waite MD personally performed the services described in this documentation    as scribed in my presence :         Argentina Ortega  PGY2 Dermatology Resident

## 2022-12-15 NOTE — PATIENT INSTRUCTIONS
Assessment and Plan:  Based on a thorough discussion of this condition and the management approach to it (including a comprehensive discussion of the known risks, side effects and potential benefits of treatment), the patient (family) agrees to implement the following specific plan:  Apply OTC Vaseline ointment daily to areas  If itchy/red, can apply desonide 0 05% ointment 2x/day until resolves (no longer than 1 week at a time)  Avoid face and groin    Assessment and Plan  Intertrigo describes a rash in the flexures or body folds, such as behind the ears, in the folds of the neck, under the arms (axillae), under a protruding abdomen, in the groin, between the buttocks, in the finger webs or toe spaces  Although intertrigo may affect one skin fold, it is common for it to involve multiple sites  Intertrigo can affect males and females of any age  It is particularly common in people that are overweight or obese (see metabolic syndrome)  Other contributing factors are:  Genetic tendency to skin disease  Hyperhidrosis (excessive sweating)    Intertrigo can be acute (recent onset), relapsing (recurrent), or chronic (present for more than 6 weeks)  The exact appearance and behaviour depends on the underlying cause or causes  The skin affected by intertrigo is inflamed, ie reddened and uncomfortable  It may become moist and macerated, leading to fissuring (cracks) and peeling  Intertrigo is due to genetic and environmental factors  Flexural skin has relatively high surface temperature  Moisture from insensible water loss and sweating cannot evaporate due to occlusion  Friction from movement of adjacent skin results in chafing  The microorganisms that are normally resident on flexural skin, the microbiome, include corynebacteria, other bacteria and yeasts  These multiply in warm moist environments and may cause disease      We can classify intertrigo into infectious and inflammatory origin but there is often overlap  Infections tend to be unilateral and asymmetrical   Inflammatory disorders tend to be symmetrical affecting armpits, groins, under the breasts and the abdominal folds, except atopic dermatitis, which more often arises on the neck, and in elbow and knee creases  Investigations may be necessary to determine the cause of intertrigo  A swab for microscopy and culture of bacteria (microbiology)  A scraping for microscopy and culture of fungi (mycology)  A skin biopsy may be performed for histopathology if the skin condition is unusual or fails to respond to treatment  What is the treatment for intertrigo? Treatment depends on the underlying cause, if identified, and on which micro-organisms are present in the rash  Combinations are common  Sweating may be reduced with a gentle antiperspirant  Physical exertion should be followed by a bath and completely drying the skin folds using a hair dryer on cool setting, soft towel and/or corn starch powder  Triple paste contains petrolatum, zinc oxide, and aluminum acetate solution to reduce friction, irritation and sweating  Bacteria may be treated with topical antibiotics such as fusidic acid cream, mupirocin ointment, or oral antibiotics such as flucloxacillin and erythromycin  Yeasts and fungi may be treated with topical antifungals such as clotrimazole and terbinafine cream or oral antifungal agents such as itraconazole or terbinafine  Inflammatory skin diseases are often treated with low potency topical steroid creams such as hydrocortisone  More potent steroids are usually avoided in the flexures because they may cause skin thinning resulting in stretch marks (striae) and even ulcers  Calcineurin inhibitors such as tacrolimus ointment or pimecrolimus cream may also prove effective

## 2022-12-16 ENCOUNTER — HOSPITAL ENCOUNTER (OUTPATIENT)
Dept: NON INVASIVE DIAGNOSTICS | Facility: CLINIC | Age: 62
Discharge: HOME | End: 2022-12-16

## 2022-12-16 VITALS
BODY MASS INDEX: 33.63 KG/M2 | HEART RATE: 66 BPM | SYSTOLIC BLOOD PRESSURE: 108 MMHG | HEIGHT: 64 IN | DIASTOLIC BLOOD PRESSURE: 82 MMHG | WEIGHT: 197 LBS

## 2022-12-16 DIAGNOSIS — I48.0 PAROXYSMAL ATRIAL FIBRILLATION (HCC): ICD-10-CM

## 2022-12-16 LAB
AORTIC ROOT: 3.6 CM
APICAL FOUR CHAMBER EJECTION FRACTION: 52 %
AV REGURGITATION PRESSURE HALF TIME: 831 MS
E WAVE DECELERATION TIME: 223 MS
FRACTIONAL SHORTENING: 29 (ref 28–44)
INTERVENTRICULAR SEPTUM IN DIASTOLE (PARASTERNAL SHORT AXIS VIEW): 1.7 CM
INTERVENTRICULAR SEPTUM: 1.7 CM (ref 0.6–1.1)
LEFT ATRIUM SIZE: 3.9 CM
LEFT INTERNAL DIMENSION IN SYSTOLE: 3 CM (ref 2.1–4)
LEFT VENTRICLE DIASTOLIC VOLUME (MOD BIPLANE): 137 ML
LEFT VENTRICLE SYSTOLIC VOLUME (MOD BIPLANE): 67 ML
LEFT VENTRICULAR INTERNAL DIMENSION IN DIASTOLE: 4.2 CM (ref 3.5–6)
LEFT VENTRICULAR POSTERIOR WALL IN END DIASTOLE: 1.4 CM
LEFT VENTRICULAR STROKE VOLUME: 44 ML
LV EF: 51 %
LVSV (TEICH): 44 ML
MV E'TISSUE VEL-SEP: 6 CM/S
MV PEAK A VEL: 0.73 M/S
MV PEAK E VEL: 71 CM/S
MV STENOSIS PRESSURE HALF TIME: 65 MS
MV VALVE AREA P 1/2 METHOD: 3.38
SL CV AV DECELERATION TIME RETROGRADE: 2866 MS
SL CV AV PEAK GRADIENT RETROGRADE: 52 MMHG
SL CV LV EF: 55
SL CV PED ECHO LEFT VENTRICLE DIASTOLIC VOLUME (MOD BIPLANE) 2D: 80 ML
SL CV PED ECHO LEFT VENTRICLE SYSTOLIC VOLUME (MOD BIPLANE) 2D: 36 ML

## 2022-12-19 ENCOUNTER — OFFICE VISIT (OUTPATIENT)
Dept: GASTROENTEROLOGY | Facility: MEDICAL CENTER | Age: 62
End: 2022-12-19

## 2022-12-19 VITALS
DIASTOLIC BLOOD PRESSURE: 58 MMHG | BODY MASS INDEX: 33.03 KG/M2 | SYSTOLIC BLOOD PRESSURE: 136 MMHG | HEART RATE: 65 BPM | WEIGHT: 192.4 LBS | TEMPERATURE: 96.3 F

## 2022-12-19 DIAGNOSIS — K21.9 GASTROESOPHAGEAL REFLUX DISEASE WITHOUT ESOPHAGITIS: Primary | ICD-10-CM

## 2022-12-19 DIAGNOSIS — R10.10 PAIN OF UPPER ABDOMEN: ICD-10-CM

## 2022-12-19 DIAGNOSIS — G47.33 OSA (OBSTRUCTIVE SLEEP APNEA): ICD-10-CM

## 2022-12-19 DIAGNOSIS — R10.12 LEFT UPPER QUADRANT ABDOMINAL PAIN: ICD-10-CM

## 2022-12-19 DIAGNOSIS — I48.0 PAROXYSMAL ATRIAL FIBRILLATION (HCC): ICD-10-CM

## 2022-12-19 DIAGNOSIS — K55.069 MESENTERIC ARTERY THROMBOSIS (HCC): ICD-10-CM

## 2022-12-19 DIAGNOSIS — K76.0 HEPATIC STEATOSIS: ICD-10-CM

## 2022-12-19 PROBLEM — R10.30 LOWER ABDOMINAL PAIN: Status: ACTIVE | Noted: 2022-12-19

## 2022-12-19 NOTE — PROGRESS NOTES
Outpatient Follow up  Felipe Clark  [address]  [address]  [facility] [address]  So Montemayor MD  Ph : [phone]  Fax : [phone]  Mobile : [phone]  Email : [email or web address]  Also available on Tiger Text    Rashard Nielsen 57 y o  female MRN: [id number]    PCP: Joni Vallejo DO  Referring: No referring provider defined for this encounter  Rashard Nielsen presented for a follow up visit  My recommendations are included  Please do not hesitate to contact me with any questions you may have  ASSESSMENT AND PLAN:      No problem-specific Assessment & Plan notes found for this encounter  Diagnoses and all orders for this visit:    Gastroesophageal reflux disease without esophagitis    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HCC)    Paroxysmal atrial fibrillation (HCC)    Left upper quadrant abdominal pain    CAMILLE (obstructive sleep apnea)    Pain of upper abdomen    Hepatic steatosis  -     US elastography/UGAP; Future         1  GERD :   - continue omeprazole  - discontinue carafate as that can cause nausea  2  Abdominal pain :   - continue Librax    3  Bruising :   - likely due to Eliquis  4  SMA thrombosis :   - recommend follow up with vascular surgery  - doppler US for evaluation  5  Hepatomegaly and hepatic steatosis on CT, LFT's normal in 9/22 :   - US elatography  - weight  Loss and diet control    - high lipid profile  - follow up with Dr Uzma Tay regarding management of the hyperlipidemia  6  IBS - C :   - trial of Linzess (she has it at home) and try for 2 weeks  If no improvement in pain then can discontinue  - use instead of colace  7  Depression :   - recommended a few options for her to go out and exercise [facility] and [facility]) which are closer to her  - follow up with psychiatry but she is not interested  - recommend to discuss with Dr Uzma Tay about psychologist    - recommend yoga       8  Colon cancer screening :   - repeat in 2032  Spent more than 35 minutes in coordinating care   ______________________________________________________________________    SUBJECTIVE:  56 y/o lady who presents for follow up  She has a h/o SMA stent  She had an appendectomy done May 3rd  She had been having abdominal pain in the lower abdomen  She had a CT done in 9/22 and was unremarkable  Her pain is in the right lower quadrant  She had this even before her appendectomy  She does have nausea  No constipation  She goes once or twice a day  She takes Colace  No bleeding  She has lost some weight  In 2019 she was 193 but then in 2022 may weight had increased to 213  Now is back to 192  She also has GERD  She takes omeprazole in am and takes Pepcid  She did not take Dexilant and was making her heart race  She is on Zofran for nausea  For her IBS she takes Librax which helps at times for a gas pain that she gets on her left side  She does not take any opioids  She has anxiety and depression  EGD : 4/22 : normal    Colonoscopy in 7/22 : normal  Hemorrhoids  REVIEW OF SYSTEMS IS OTHERWISE NEGATIVE  Historical Information   Past Medical History:   Diagnosis Date   • [facility] 03/2020   • Allergic    • Anxiety    • Arthritis    • Asthma    • Colon polyp    • Depression    • GERD (gastroesophageal reflux disease)    • Heart murmur    • Hives    • Hyperlipidemia    • Hypertension    • Infertility, female    • Migraine    • Palpitations    • Psychiatric disorder    • Wears glasses      Past Surgical History:   Procedure Laterality Date   • COLONOSCOPY     • EGD     • EXPLORATORY LAPAROTOMY      for infertility   • HAND SURGERY      Hand excision of tendon cyst   • FL LAP,APPENDECTOMY N/A 5/3/2022    Procedure: APPENDECTOMY LAPAROSCOPIC;  Surgeon:  Jose Costa MD;  Location: [facility];  Service: General   • [address] ARTERY STENT     • [address] EXTRACTION       Social History   Social History     Substance and Sexual Activity   Alcohol Use Never     Social History     Substance and Sexual Activity   Drug Use No     Social History     Tobacco Use   Smoking Status Former   • Packs/day: 0 50   • Years: 10 00   • Pack years: 5 00   • Types: Cigarettes   • Quit date: 2019   • Years since quitting: 3 9   Smokeless Tobacco Never     Family History   Problem Relation Age of Onset   • Lung cancer Mother 61   • Brain cancer Mother 61   • Diabetes Father    • Depression Father    • Other Father         septic   • Allergies Sister    • Hashimoto's thyroiditis Sister    • Abdominal aortic aneurysm Sister    • Diabetes Sister    • No Known Problems Sister    • No Known Problems Maternal Grandmother    • No Known Problems Maternal Grandfather    • No Known Problems Paternal Grandmother    • No Known Problems Paternal Grandfather    • Hodgkin's lymphoma Brother    • No Known Problems Brother    • No Known Problems Maternal Aunt    • Diabetes Other        Meds/Allergies       Current Outpatient Medications:   •  acetaminophen (TYLENOL) 325 mg tablet  •  apixaban (Eliquis) 5 mg  •  aspirin 81 mg chewable tablet  •  azelastine (OPTIVAR) 0 05 % ophthalmic solution  •  butalbital-acetaminophen-caffeine (FIORICET,ESGIC) -40 mg per tablet  •  cetirizine (ZyrTEC) 10 mg tablet  •  chlordiazepoxide-clidinium (LIBRAX) 5-2 5 mg per capsule  •  chlorhexidine (PERIDEX) 0 12 % solution  •  Cholecalciferol 25 MCG (1000 UT) tablet  •  clotrimazole-betamethasone (LOTRISONE) 1-0 05 % cream  •  desonide (DESOWEN) 0 05 % ointment  •  diltiazem (CARDIZEM CD) 120 mg 24 hr capsule  •  diltiazem (DILACOR XR) 120 MG 24 hr capsule  •  Evolocumab 140 MG/ML SOAJ  •  fluticasone-vilanterol (BREO ELLIPTA) 200-25 MCG/INH inhaler  •  furosemide (LASIX) 40 mg tablet  •  Galcanezumab-gnlm 120 MG/ML SOAJ  •  Pauline, Zingiber officinalis, (PAULINE PO)  •  hydrOXYzine HCL (ATARAX) 25 mg tablet  •  ipratropium (ATROVENT) 0 06 % nasal spray  • LORazepam (ATIVAN) 1 mg tablet  •  metoprolol succinate (TOPROL-XL) 100 mg 24 hr tablet  •  montelukast (SINGULAIR) 10 mg tablet  •  MULTIPLE VITAMIN PO  •  nystatin (MYCOSTATIN) powder  •  omeprazole (PriLOSEC) 40 MG capsule  •  ondansetron (Zofran ODT) 4 mg disintegrating tablet  •  phenazopyridine (PYRIDIUM) 200 mg tablet  •  Probiotic Product (PROBIOTIC-10 PO)  •  spironolactone (ALDACTONE) 25 mg tablet  •  traMADol (ULTRAM) 50 mg tablet  •  triamcinolone acetonide (KENALOG-40) 40 mg/mL  •  Turmeric 500 MG CAPS  •  Zinc 100 MG TABS  •  LORazepam (ATIVAN) 0 5 mg tablet    Current Facility-Administered Medications:   •  cyanocobalamin injection 1,000 mcg, 1,000 mcg, Intramuscular, Q30 Days, 1,000 mcg at 10/27/22 1049  •  cyanocobalamin injection 1,000 mcg, 1,000 mcg, Intramuscular, Q30 Days, 1,000 mcg at 11/28/22 1017    Allergies   Allergen Reactions   • Ace Inhibitors Angioedema and Anaphylaxis     Anaphylaxis   • Hydralazine Other (See Comments)     Lip swelling  Flank pain   • Other Anaphylaxis and Other (See Comments)     Other reaction(s): angioadema  Other reaction(s): Other (See Comments)  Preservatives- itching throat closes, hi  Other reaction(s): angioadema  E Z Cat - hives throat closes itching,  Preservatives- itching throat closes, hi  Artificial sweeteners   • Valsartan Angioedema     Lips, face swollen   • Ampicillin Hives     Depends on brand some preservatives can react  Has recently tolerated oral amoxicillin  • Benicar [Olmesartan] Angioedema     See Allergy note from 9/11/2008  Swollen ankles/legs   • Sulfa Antibiotics Hives     stuffiness,itching,hives,throat closing           Objective     Blood pressure 136/58, pulse 65, temperature (!) 96 3 °F (35 7 °C), temperature source Tympanic, weight 87 3 kg (192 lb 6 4 oz), not currently breastfeeding  Body mass index is 33 03 kg/m²  PHYSICAL EXAM:      Physical Exam  Constitutional:       Appearance: Normal appearance   She is well-developed  HENT:      Head: Normocephalic and atraumatic  Eyes:      General: No scleral icterus  Conjunctiva/sclera: Conjunctivae normal       Pupils: Pupils are equal, round, and reactive to light  Cardiovascular:      Rate and Rhythm: Normal rate and regular rhythm  Heart sounds: Normal heart sounds  Pulmonary:      Effort: Pulmonary effort is normal  No respiratory distress  Breath sounds: Normal breath sounds  Abdominal:      General: Bowel sounds are normal  There is no distension  Palpations: Abdomen is soft  There is no mass  Tenderness: There is no abdominal tenderness  Hernia: No hernia is present  Musculoskeletal:         General: Normal range of motion  Cervical back: Normal range of motion  Lymphadenopathy:      Cervical: No cervical adenopathy  Skin:     General: Skin is warm  Neurological:      Mental Status: She is alert and oriented to person, place, and time  Psychiatric:         Behavior: Behavior normal          Thought Content: Thought content normal          Lab Results:   No visits with results within 1 Day(s) from this visit     Latest known visit with results is:   Hospital Outpatient Visit on 12/16/2022   Component Date Value   • A4C EF 12/16/2022 52    • LV Diastolic Volume (BP) 40/09/9769 409    • LV Systolic Volume (BP) 02/66/6052 67    • EF 12/16/2022 51    • LVIDd 12/16/2022 4 20    • LVIDS 12/16/2022 3 00    • IVSd 12/16/2022 1 70    • LVPWd 12/16/2022 1 40    • FS 12/16/2022 29    • MV E' Tissue Velocity Se* 12/16/2022 6    • E wave deceleration time 12/16/2022 223    • MV Peak E Johan 12/16/2022 71    • MV Peak A Johan 12/16/2022 0 73    • LA size 12/16/2022 3 9    • AV Deceleration Time 12/16/2022 2,866    • AV regurgitation pressur* 12/16/2022 831    • MV stenosis pressure 1/2* 12/16/2022 65    • MV valve area p 1/2 meth* 12/16/2022 3 38    • Ao root 12/16/2022 3 60    • AV peak gradient 12/16/2022 52    • Left ventricular stroke * 12/16/2022 44 00    • IVS 12/16/2022 1 7    • LEFT VENTRICLE SYSTOLIC * 72/85/4328 36    • LV DIASTOLIC VOLUME (MOD* 06/13/2329 80    • LVSV, 2D 12/16/2022 44    • LV EF 12/16/2022 55          Radiology Results:   Echo follow up/limited w/ contrast if indicated    Result Date: 12/16/2022  Narrative: •  Left Ventricle: Left ventricular cavity size is normal  Wall thickness is moderately increased  There is moderate concentric hypertrophy  The left ventricular ejection fraction is 55%  Systolic function is normal  Although no diagnostic regional wall motion abnormality was identified, this possibility cannot be completely excluded on the basis of this study  Diastolic function is moderately abnormal, consistent with grade II (pseudonormal) relaxation  •  Right Ventricle: Right ventricular cavity size is normal  Systolic function is normal  •  Aortic Valve: There is mild regurgitation  •  Mitral Valve: There is mild regurgitation  •  Aorta: The aortic root is normal in size  •  Prior TTE study available for comparison  Prior study date: 9/2/2022  No significant changes noted compared to the prior study

## 2022-12-21 ENCOUNTER — HOSPITAL ENCOUNTER (OUTPATIENT)
Dept: ULTRASOUND IMAGING | Facility: HOSPITAL | Age: 62
Discharge: HOME | End: 2022-12-21
Attending: INTERNAL MEDICINE

## 2022-12-21 ENCOUNTER — TELEPHONE (OUTPATIENT)
Dept: VASCULAR SURGERY | Facility: CLINIC | Age: 62
End: 2022-12-21

## 2022-12-21 ENCOUNTER — OFFICE VISIT (OUTPATIENT)
Dept: VASCULAR SURGERY | Facility: CLINIC | Age: 62
End: 2022-12-21

## 2022-12-21 VITALS
DIASTOLIC BLOOD PRESSURE: 92 MMHG | HEIGHT: 64 IN | SYSTOLIC BLOOD PRESSURE: 138 MMHG | OXYGEN SATURATION: 94 % | WEIGHT: 194.4 LBS | HEART RATE: 68 BPM | BODY MASS INDEX: 33.19 KG/M2

## 2022-12-21 DIAGNOSIS — K55.069 MESENTERIC ARTERY THROMBOSIS (HCC): ICD-10-CM

## 2022-12-21 DIAGNOSIS — K76.0 HEPATIC STEATOSIS: ICD-10-CM

## 2022-12-21 DIAGNOSIS — I65.22 STENOSIS OF LEFT CAROTID ARTERY: Primary | ICD-10-CM

## 2022-12-21 RX ORDER — CHLORHEXIDINE GLUCONATE 0.12 MG/ML
15 RINSE ORAL ONCE
OUTPATIENT
Start: 2022-12-21 | End: 2022-12-21

## 2022-12-21 RX ORDER — CEFAZOLIN SODIUM 2 G/50ML
2000 SOLUTION INTRAVENOUS ONCE
OUTPATIENT
Start: 2022-12-21 | End: 2022-12-21

## 2022-12-21 NOTE — PROGRESS NOTES
Assessment/Plan:    Stenosis of left carotid artery  Hx of high grade left carotid artery stenosis  Had an in-depth discussion with patient today in the office that her left-sided facial symptoms are not related to her carotid artery stenosis but nevertheless the degree/severity of stenosis would warrant recommendation for left carotid intervention  We did review the options of carotid stent versus endarterectomy versus continue medical management  I believe in her case a carotid endarterectomy would be most appropriate  The procedure, risk, benefits, alternatives, and anticipated postop course were discussed in detail  Patient did sign consent however has asked that surgery be deferred until she calls the office and confirms she wishes to proceed  At this time she reports that with her "interstitial cystitis and her SMA issues there is too much going on and I would not do well in the hospital"  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HCC)  Mesenteric duplex demonstrates a widely patent SMA stent  Of note she continues to complain of bruising  She comes to the office with several photos  We discussed that her bruising is likely related to her anticoagulation  Diagnoses and all orders for this visit:    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HCC)    Stenosis of left carotid artery          Subjective:      Patient ID: Nhan Posadas is a 57 y o  female  Patient is here today to review results of Celiac/Messenteric duplex  Patient c/o pain in the left upper quadrant of her abdomen and flank pain  She is s/p a SMA stent done 8/1/2021 by Dr Justin Harkins at [facility]  She also c/o wt loss  Patient is taking Eliquis and ASA 81 mg  Patient is a non-smoker  Dominick Villa returns to the office to review mesenteric as well as carotid duplex  She continues with a myriad of symptoms/complaints including left facial numbness/weakness as well as "heartbeat" in left ear, nausea and bruising        The following portions of the patient's history were reviewed and updated as appropriate: allergies, current medications, past family history, past medical history, past social history, past surgical history and problem list     Review of Systems   Constitutional: Negative  HENT: Negative  Eyes: Negative  Respiratory: Positive for shortness of breath (SOB w/activity)  Cardiovascular: Negative  Gastrointestinal: Positive for abdominal pain  Endocrine: Negative  Genitourinary: Positive for difficulty urinating  Musculoskeletal: Positive for back pain  Skin: Negative  Allergic/Immunologic: Positive for environmental allergies  Neurological: Positive for dizziness, weakness and headaches  Hematological: Bruises/bleeds easily  Psychiatric/Behavioral: Negative  I have personally reviewed the ROS entered by MA and agree as documented  Objective:      /92 (BP Location: Left arm, Patient Position: Sitting, Cuff Size: Large)   Pulse 68   Ht 5' 4" (1 626 m)   Wt 88 2 kg (194 lb 6 4 oz)   LMP  (LMP Unknown)   SpO2 94%   BMI 33 37 kg/m²          Physical Exam  Constitutional:       General: She is not in acute distress  Appearance: She is well-developed  She is obese  She is not ill-appearing, toxic-appearing or diaphoretic  HENT:      Head: Normocephalic and atraumatic  Eyes:      General: No scleral icterus  Conjunctiva/sclera: Conjunctivae normal    Neck:      Trachea: No tracheal deviation  Cardiovascular:      Rate and Rhythm: Normal rate  Rhythm irregular  Heart sounds: Normal heart sounds  Pulmonary:      Effort: Pulmonary effort is normal       Breath sounds: Normal breath sounds  Abdominal:      General: There is no distension  Palpations: Abdomen is soft  There is no mass (no appreciable aortic pulsation/aneurysm)  Tenderness: There is no abdominal tenderness  There is no guarding or rebound  Musculoskeletal:         General: Normal range of motion        Cervical back: Normal range of motion and neck supple  Skin:     General: Skin is warm and dry  Neurological:      Mental Status: She is alert and oriented to person, place, and time  Psychiatric:         Mood and Affect: Mood normal          Behavior: Behavior normal          Thought Content: Thought content normal          Judgment: Judgment normal            Carotid duplex:  LEFT:  There is >70% stenosis noted in the internal carotid artery  Plaque is  Heterogenous  Segment      Rig                     Left                        PSV  EDV (cm/s)  Ratio  Impression  PSV  EDV (cm/s)  Ratio    Mid  ICA     116          37   1 76              213          86   2 87    Prox  ICA     48          19   0 73  70 - 99%    357         140   4 81    Mid CCA       66          19   0 77               74          19   1 07    Prox CCA      85          16                      69          19           Ext Carotid   80           6   1 22              222          40   2 99    Prox Vert     88          28                      47          18           Subclavian   198           0                     167           0                Operative Scheduling Information:    Hospital:  [facility]    Physician:  Jarvis Rebolledo    Surgery: Left carotid endarterectomy    Urgency:  Standard    Level:  Level 4: Outpatients to be scheduled for screening procedures and elective surgery that can be delayed for longer than one month without reasonable expectation of detriment to patient      Case Length:  3 hours    Post-op Bed:  ICU    OR Table:  Standard    Equipment Needs:  Product/Implant: bovine patch    Medication Instructions:  Aspirin:   Continue (do not hold)  Eliquis:  Hold for 3 days prior to procedure    Hydration:  No    Contrast Allergy:  no

## 2022-12-21 NOTE — TELEPHONE ENCOUNTER
REMINDER: Under Reason For Call, comments MUST be formatted as:   (Surgeon's Initials) / (Procedure)      Special Instructions / FYI:    Procedure: (CEA) Carotid Endarterectomy / Patch Angioplasty     Level: 4 - Route clearance(s) to [facility] Surgery Coordinator Pool    Allergies: Ace inhibitors, Hydralazine, Other, Valsartan, Ampicillin, Benicar [olmesartan], and Sulfa antibiotics    Instructions Given: NO Bowel Prep General Instructions     Dialysis: Patient is not on dialysis  Return Visit Required Prior to Procedure: No     Consent: I certify that patient has signed, printed, timed, and dated their surgery consent  I certify that the patient's LEGAL NAME and DATE OF BIRTH are written in the upper left corner on BOTH sides of the consent  I certify that BOTH sides of the completed surgery consent have been scanned into the patient's Epic chart by annelise on 12/21/2022  Yes, I have LABELED the consent in Epic as Consent for Vascular Procedure  For Surgical Clearances     Levels   1-3   ROUTE this encounter to [facility] Clearance Pool (AND)   [facility] Surgery Coordinator Pool     Level   4   ROUTE this encounter to [facility] Surgery Coordinator Pool       HYDRATION CLEARANCES   ONLY ROUTE TO  [facility] Clearance Pool     (1) ONE CLEARANCE NEEDED - Cardiology  Clearance // Clearing Provider's Name ([place]): Elicia Villareal  //  Spoke with: Donavon Tracy  //  Office Contact Information P: [phone] - F: [phone]    Yes, I have ROUTED this encounter to [facility] Surgery Coordinator and/or [facility] Clearance Pool

## 2022-12-21 NOTE — LETTER
22    Re: Cardiology  Clearance    Patient Name: Jordan King   Patient : 1960   Patient MRN: [id number]   Patient Phone: [phone]     Dr Therese Webster,    Dr Corinne Perez, DO is requesting clearance for above patient, prior to proceeding with carotid endarterectomy (CEA) / patch angioplasty   Patient's procedure will be scheduled at [address] [place] [address] once clearance has been obtained  Patient will be given general anesthesia  We spoke with your office today regarding clearance request   Sreedhar informed us that patient was recently seen and may not require an appointment with you  They informed us that they will reach out to the patient to schedule if needed  Please fax your recommendations, including any medication recommendations, to [phone], attention nursing  Or route your recommendations to Epic pool [facility] Clearance Pool [[id number]]  Please reach out with any questions or concerns      Sincerely,    [facility]

## 2022-12-21 NOTE — ASSESSMENT & PLAN NOTE
Mesenteric duplex demonstrates a widely patent SMA stent  Of note she continues to complain of bruising  She comes to the office with several photos  We discussed that her bruising is likely related to her anticoagulation

## 2022-12-21 NOTE — LETTER
01/20/23                                                             URGENT                                                                        RE: Member LH:[id number]      To whom it may concern:    Patient, Tala Castro (1960), has an URGENT procedure scheduled on 01/31/23 at [address] (Tax: [id number] / NPI: [id number]) [place] with Dr Sergio Ortega // Grace Shukla (NPI: [id number])  We are requesting authorization for inpatient CPT code(s) 10856  Please review the attached clinical documentation and provide your determination  Should you have any questions or concerns regarding the details of this case, please do not hesitate to reach out  Respectfully,      Eliu Manuel  Prior Authorization & Referral  [facility]  [address] [facility]  [facility] [place]  [address]   P: [phone]  F: [phone]  [email or web address]  www [email or web address]                                                  Contact Name: Maira        Phone Number: [phone]          Fax Number: [phone]   Member Information   Member Name:  Tala Castro Member ID Number:           [id number] YOB: 1960 Member's Phone Number:     [phone]    Authorization Number, if applicable:  Not Applicable (N/A)   Primary Insurance?   [x] Yes    /   [] No Name of Carrier: Primary Insurer Member ID: Primary Authorization Number:      Provider Information   Physician Name / Jen Gar / Tax ID:  Sergio Ortega // Grace Shukla (NPI: [id number]) / [id number] Physician Phone Number:  [phone] Physician Fax Number:  [phone]   Facility Name / Tax ID/NPI:  [place] (Tax: [id number] / NPI: [id number]) Facility Phone Number:  [phone] Facility Fax Number:  [phone]   Codes     ICD Diagnosis Code   Description   CPT Codes   Requested Units per Code       I65 22   Stenosis of left carotid artery            ENDARTERECTOMY ARTERY CAROTID   10619   1                                 Prior [employer] Requested   [] Outpatient      [x] Inpatient      [] Ambulatory Surgery     [] Office Visit     [] Genetic Testing       Requested Date(s) of Service:  01/31/23       Additional Information:                                  URGENT   CLINICAL NOTES TO SUPPORT THE MEDICAL NEED OF THIS SERVICE ARE REQUIRED  ** ALL FIELDS MUST BE COMPLETED FOR REQUEST TO BE PROCESSED  **

## 2022-12-21 NOTE — ASSESSMENT & PLAN NOTE
Hx of high grade left carotid artery stenosis  Had an in-depth discussion with patient today in the office that her left-sided facial symptoms are not related to her carotid artery stenosis but nevertheless the degree/severity of stenosis would warrant recommendation for left carotid intervention  We did review the options of carotid stent versus endarterectomy versus continue medical management  I believe in her case a carotid endarterectomy would be most appropriate  The procedure, risk, benefits, alternatives, and anticipated postop course were discussed in detail  Patient did sign consent however has asked that surgery be deferred until she calls the office and confirms she wishes to proceed  At this time she reports that with her "interstitial cystitis and her SMA issues there is too much going on and I would not do well in the hospital"  Addendum: After leaving after today's office visit patient returned moments later and stated she wishes to proceed with left carotid endarterectomy

## 2022-12-21 NOTE — PATIENT INSTRUCTIONS
Carotid Endarterectomy   WHAT YOU NEED TO KNOW:   What do I need to know about carotid endarterectomy (CEA)? CEA is surgery to remove plaque (fatty deposits) from inside your carotid artery  The carotid artery is a blood vessel found on both sides of your neck  Plaque may build up inside your carotid artery and decrease blood flow to your brain  A piece of plaque may also break free and cause a stroke  How do I prepare for surgery? Your surgeon will tell you how to prepare  He or she may tell you not to eat or drink anything after midnight on the day of surgery  Arrange to have someone drive you home when you are discharged  Tell your surgeon about all medicines you currently take  He or she will tell you if you need to stop any medicine for surgery, and when to stop  He or she will tell you if you need to take aspirin or a blood-thinning medicine before surgery  This medicine will help stop clots from forming in your blood  He or she will tell you which medicines to take or not take on the day of surgery  You may need blood tests before surgery  You may also need an ultrasound, CT scan, or MRI  Tell the healthcare provider if you have ever had an allergic reaction to contrast liquid  Do not enter the MRI room with anything metal  Metal can cause serious injury  Tell the provider if you have any metal in or on your body  The tests may be done to check the plaques in your carotid artery  Angiography may be used to check the blood flow from your carotid artery to your brain  You may need tests to make sure your heart is healthy enough for surgery  This may be needed if you have heart failure or are at increased risk for a heart attack  What will happen during surgery? You will be given regional or general anesthesia to prevent pain during surgery  An incision will be made in your neck, over your carotid artery  Your surgeon will make an incision in the artery   He or she will separate the plaque from the artery wall  Then he or she will remove the plaque  The artery will be closed or put back together with stitches  An artificial patch or a piece of your own vein may also be used to close your artery  The blood flow through your carotid artery will be checked  A drain may be used to remove extra blood and fluid from around your artery  Your skin will be closed with stitches and covered with a bandage  What should I expect after surgery? Healthcare providers will remove the bandage soon after surgery to check the incision  Do not get out of bed until your healthcare provider says it is okay  Your head will be kept straight for 24 hours after surgery  This helps lower your risk for side effects, such as swelling and fainting  The head of your bed may also be slightly raised  Medicines  may be given to thin your blood to prevent blood clots  You may also need medicine to lower your cholesterol or blood pressure level  Ultrasound pictures  may be used to look for clots in your carotid artery  What are the risks of CEA? Muscles or nerves near your carotid artery may be damaged  Nerve damage may lead to changes in how you look, or trouble chewing and swallowing  You may bleed more than expected or develop an infection  You may have headaches, high blood pressure, or abnormal heartbeats  Swelling in your surgery area may block your airway  A fistula (abnormal connection) may form between your carotid artery and another tissue  You are also at risk for a life-threatening heart attack during or after surgery  You may get a blood clot in your leg, arm, or carotid artery  The clot may travel to your heart or brain and cause life-threatening problems, such as a heart attack or stroke  After CEA, your carotid artery may become narrow or full of plaque again  The patch used for your surgery may tear or loosen, causing a large amount of blood loss      CARE AGREEMENT:   You have the right to help plan your care  Learn about your health condition and how it may be treated  Discuss treatment options with your healthcare providers to decide what care you want to receive  You always have the right to refuse treatment  The above information is an  only  It is not intended as medical advice for individual conditions or treatments  Talk to your doctor, nurse or pharmacist before following any medical regimen to see if it is safe and effective for you  © Copyright [employer] 2022 Information is for End User's use only and may not be sold, redistributed or otherwise used for commercial purposes   All illustrations and images included in CareNotes® are the copyrighted property of [employer]  or [address] [place] [address] [employer]

## 2022-12-22 ENCOUNTER — TELEPHONE (OUTPATIENT)
Dept: FAMILY MEDICINE CLINIC | Facility: CLINIC | Age: 62
End: 2022-12-22

## 2022-12-22 ENCOUNTER — NURSE TRIAGE (OUTPATIENT)
Dept: OTHER | Facility: OTHER | Age: 62
End: 2022-12-22

## 2022-12-22 DIAGNOSIS — R39.9 UTI SYMPTOMS: Primary | ICD-10-CM

## 2022-12-22 NOTE — TELEPHONE ENCOUNTER
Patient called in stating yesterday she has difficulty urinating and frequency  States yesterday she only had a few drops and felt like something was clogged  States this morning she voided and passed a dime sized clot  States lower abdominal pain of 10/10  States she doesn't want to go to ED prefers to be seen in office for symptoms  Took tramadol and pyridium

## 2022-12-22 NOTE — TELEPHONE ENCOUNTER
Patient called in wondering if we can contact pharmacy to refill tramadol sooner because she is having an IC flare and infection and has been using the tramadol for pain  Please advise

## 2022-12-22 NOTE — TELEPHONE ENCOUNTER
Spoke to patient and let her know that Tramadol cannot be refilled  Patient states she is in a lot of pain, any other recommendations  States she cannot take ibuprofen due to afib  Please advise

## 2022-12-22 NOTE — TELEPHONE ENCOUNTER
Regarding: clot when urinating  ----- Message from [facility] sent at 12/22/2022  9:15 AM EST -----  "When I went to the bathroom a yellow/orange clot came out    I am experiencing urgency to urinate "

## 2022-12-22 NOTE — TELEPHONE ENCOUNTER
Reason for Disposition  • Urinating more frequently than usual (i e , frequency)    Answer Assessment - Initial Assessment Questions  1  SYMPTOM: "What's the main symptom you're concerned about?" (e g , frequency, incontinence)      Frequency lower back pain   2  ONSET: "When did the symptoms start?"     Yesterday   3  PAIN: "Is there any pain?" If Yes, ask: "How bad is it?" (Scale: 1-10; mild, moderate, severe)      Lower abdomen 10/10  4  CAUSE: "What do you think is causing the symptoms?"      Hx of uti   5   OTHER SYMPTOMS: "Do you have any other symptoms?" (e g , fever, flank pain, blood in urine, pain with urination)  Clot - big as a dime x1, vaginal itching   Took pyridium    difficulty urinating yesterday    Protocols used: URINARY SYMPTOMS-ADULT-OH

## 2022-12-22 NOTE — TELEPHONE ENCOUNTER
Called and spoke with patient, states having some red spots thriugh out urine  Think she may have an infection  Finished medication on 12/9/22  Reports symptoms are increasing frequency and urgency, burning  Reports yesterday felt like she was clogged but after passing clot this morning felt better  Pt tearful through out call reports pain 10/10  Advised patient several times if pain is that bad needs to go to ED  Pt denying ED due to previous experience  Advised patietn again ED best for pain and symptoms  Pt reports abdominal anf flank pain  Pt requesting UA/UC be placed and appt scheduled with Dr Jamar Harding  Orders palced and appt scheduled  Again advised pt to go to ED states she will think about it

## 2022-12-23 DIAGNOSIS — N30.10 INTERSTITIAL CYSTITIS: Primary | ICD-10-CM

## 2022-12-23 DIAGNOSIS — N30.00 ACUTE CYSTITIS WITHOUT HEMATURIA: ICD-10-CM

## 2022-12-25 DIAGNOSIS — I10 ESSENTIAL HYPERTENSION: ICD-10-CM

## 2022-12-25 DIAGNOSIS — I10 BENIGN ESSENTIAL HYPERTENSION: ICD-10-CM

## 2022-12-26 ENCOUNTER — APPOINTMENT (OUTPATIENT)
Dept: LAB | Facility: HOSPITAL | Age: 62
End: 2022-12-26

## 2022-12-26 DIAGNOSIS — R39.9 UTI SYMPTOMS: ICD-10-CM

## 2022-12-26 DIAGNOSIS — N30.00 ACUTE CYSTITIS WITHOUT HEMATURIA: ICD-10-CM

## 2022-12-26 DIAGNOSIS — N30.10 INTERSTITIAL CYSTITIS: ICD-10-CM

## 2022-12-26 LAB
BACTERIA UR QL AUTO: ABNORMAL /HPF
BILIRUB UR QL STRIP: NEGATIVE
CLARITY UR: ABNORMAL
COLOR UR: YELLOW
GLUCOSE UR STRIP-MCNC: NEGATIVE MG/DL
HGB UR QL STRIP.AUTO: ABNORMAL
KETONES UR STRIP-MCNC: NEGATIVE MG/DL
LEUKOCYTE ESTERASE UR QL STRIP: ABNORMAL
NITRITE UR QL STRIP: POSITIVE
NON-SQ EPI CELLS URNS QL MICRO: ABNORMAL /HPF
PH UR STRIP.AUTO: 6.5 [PH]
PROT UR STRIP-MCNC: NEGATIVE MG/DL
RBC #/AREA URNS AUTO: ABNORMAL /HPF
SP GR UR STRIP.AUTO: 1.01 (ref 1–1.03)
UROBILINOGEN UR QL STRIP.AUTO: 0.2 E.U./DL
WBC #/AREA URNS AUTO: ABNORMAL /HPF
WBC CLUMPS # UR AUTO: PRESENT /UL

## 2022-12-27 RX ORDER — LEVOFLOXACIN 750 MG/1
750 TABLET ORAL EVERY 24 HOURS
Qty: 7 TABLET | Refills: 0 | Status: SHIPPED | OUTPATIENT
Start: 2022-12-27 | End: 2023-01-03

## 2022-12-27 RX ORDER — FUROSEMIDE 40 MG/1
TABLET ORAL
Qty: 90 TABLET | Refills: 0 | OUTPATIENT
Start: 2022-12-27

## 2022-12-27 NOTE — RESULT ENCOUNTER NOTE
Inform patient via [employer]  Please review the pathology/lab result of further discussion  Copied from [employer] message :       Jenniffer Yenni,     Your ultrasound elastography showed no fibrosis or scarring which is good news  It did show fatty liver for which I would recommend ongoing diet control and regular exercise  It is recommended that people with hepatic steatosis/fatty liver consider losing 5 to 7% of their body weight  If you need further assistance with this please follow-up with our office or with Dr Jordana Wright      Best regards,     Dino Mccarthy MD

## 2022-12-27 NOTE — TELEPHONE ENCOUNTER
I spoke with patient and provided her with this information  Pt has an appt with Dr Hobson tomorrow for follow up and will discuss with him about IV antibiotics

## 2022-12-27 NOTE — PROGRESS NOTES
[address] [facility]  [facility]  [address] [facility] [place] [address] [facility]  [phone]  www  [facility]  org      NAME: Jenny Frankel  AGE: 57 y o  SEX: female  : 1960   MRN: [id number]    DATE: 2022  TIME: 5:21 PM    Assessment and Plan:    Chronic urethral and bladder pain, lower abdominal pain, certainly sounds like a form of interstitial cystitis with a history of Hunner's ulcers, and has been extensively evaluated  I think this is now overlapping with symptomatic recurrent urinary tract infections  We have been resisting treating her with antibiotics in conjunction with infectious disease because some of it we thought was the interstitial cystitis and asymptomatic bacteriuria  However she does have a Proteus Mirabella's infection currently and I think these are now symptomatic so they need to be treated  She is currently on Levaquin 750 mg daily and she will continue this and complete the course  I am going to start her on methenamine 1000 mg p o  twice daily and she will take this with 1000 mg of vitamin C per day  90-day course was sent to her pharmacy  Unfortunately it sounds like she is unable to tolerate estrogen cream   The yellow clots she passed may have been old clot which was broken down fibrin stained yellow by urine  She had a normal CAT scan with no suggestion of any form of colovesical fistula in 2021  If she continues to have recurrent infections, we may need to revisit the estrogen cream and see if she can tolerate another formulation and consider repeating a CAT scan  As said before I wish to avoid any urethral manipulation because this seems to exacerbate things  Schedule her for 6-month visit to see how things are going and hopefully the phentermine will cut down on the infections  Continue with the hydroxyzine and Prelief  she can take pyridium 1-2 per day- she knows to limit its use  I sent in a new Rx because of the severity of the pain, I will prescribe her Tramadol upon her requst  She will decrease this and try Alleve  With recurrent UTI, I placed a standing order for urine cultures to be done as often as every 4 weeks and if she gets symptoms she will go in and have a urine culture done without having to call our office  Chief Complaint   No chief complaint on file  History of Present Illness   Last seen by me November 18, 2022 for interstitial cystitis, affected by diet and she has had an extensive work-up  I have had nothing else to add in terms of investigations  She does not tolerate installations well and she has severe urethral tenderness so any form of instrumentation can trigger your series of events  There is also a question of recurrent UTI  She is seeing infectious disease for this and they recommend not treating what seems to be bacteriuria  However I have considered starting her on methenamine in the future if it becomes a recurrent issue and we wish to get some kind of control from that regard  Last office visit I had her try hydroxyzine and Prelief  DMSO and hydrodistention remain an option, but he wished to avoid any urethral manipulation  She sent me a message showing mucus in her urine with a photograph December 22, 2022 and was complaining of flank pain, nausea, lower abdominal pain and her urethra was feeling very very inflamed and having dysuria  Urine culture yesterday grew out 1 greater than 100,000 colonies of Proteus and we are awaiting the sensitivities  She also had Proteus greater than 100,000 colonies November 29, 2022- sensitivities came back this AM and I have sent Cipro to her pharmacy- however, Kathryn Maoton was sent yesterday upon further search of her meds  she was treated with Cefdroxil by ID for symptoamtic UTI 12/2   On 12/17, became dizzy and had difficulty walking- was having pain and rawness in urethra so she  called here- didn't want to go to ED because her last visit to ED "they hurt me so bad with catheter"- she relates these symptoms to her bladder because she had passed a large "clump" on the 22nd and had difficulty voiding, so she thought she had a severe UTI and it was affecting her systemically  She had some mild gross "Tiny dot" hematuria  She tried hydroxyzine- which helps her sleep- 50mg  The pain was so bad she had to take 100mg of Tramadol one time  She has not had intercourse for 16 years, and does not do dilators  She has tried estrogen cream 3-4 years ago and had to stop due to itching  Currently, she hurts from her SP region to vagina, and right lower quadrant  The following portions of the patient's history were reviewed and updated as appropriate: allergies, current medications, past family history, past medical history, past social history, past surgical history and problem list   Past Medical History:   Diagnosis Date   • A-fib ([facility] ) 03/2020   • Allergic    • Anxiety    • Arthritis    • Asthma    • Colon polyp    • Depression    • GERD (gastroesophageal reflux disease)    • Heart murmur    • Hives    • Hyperlipidemia    • Hypertension    • Infertility, female    • Migraine    • Palpitations    • Psychiatric disorder    • Wears glasses      Past Surgical History:   Procedure Laterality Date   • COLONOSCOPY     • EGD     • EXPLORATORY LAPAROTOMY      for infertility   • HAND SURGERY      Hand excision of tendon cyst   • RI LAPAROSCOPIC APPENDECTOMY N/A 5/3/2022    Procedure: APPENDECTOMY LAPAROSCOPIC;  Surgeon: Zach Corey MD;  Location: [facility];  Service: General   • SUPERIOR [address] ARTERY STENT     • WISDOM TOOTH EXTRACTION       shoulder  Review of Systems   Review of Systems   Gastrointestinal: Negative for diarrhea     Genitourinary:        As per HPI       Active Problem List     Patient Active Problem List   Diagnosis   • Acute upper respiratory infection   • Allergic rhinitis   • Angina pectoris ([facility] )   • Anxiety and depression   • Arthritis   • Asthma due to seasonal allergies   • Attention disturbance   • Benign essential hypertension   • Interstitial cystitis   • Chronic low back pain   • Familial hyperlipidemia   • Elevated antinuclear antibody (SON) level   • Elevated blood sugar   • Essential hypertension   • Female pelvic pain   • Hyperlipidemia   • Lipid disorder   • Migraines   • Obesity (BMI 30-39  9)   • Tick bite   • Tobacco abuse   • Venous insufficiency   • Anxiety   • Insomnia   • Snoring   • Cervicalgia   • Headache   • AMD (age-related macular degeneration), bilateral   • Allergic reaction   • Palpitations and chest pain     • Intertrigo   • Acute gastric ulcer without hemorrhage or perforation   • Tinea corporis   • Gastroesophageal reflux disease without esophagitis   • Allergic conjunctivitis of both eyes   • Intrinsic atopic dermatitis   • Angio-edema   • Vasomotor rhinitis   • Other chest pain   • Low TSH level   • Paroxysmal atrial fibrillation (HCC)   • Vaginal candidiasis   • WELL[place] (dyspnea on exertion)   • Hair loss   • Patellar tendinitis of left knee   • Injury of toe on right foot   • Wheezing   • Lumbar radiculopathy   • Chronic fatigue   • Acute pyelonephritis   • CAMILLE (obstructive sleep apnea)   • Hypertensive heart disease with congestive heart failure ([facility])   • Shortness of breath   • Unspecified diastolic (congestive) heart failure ([facility])   • Vitamin B12 deficiency   • Left upper quadrant pain   • Diverticulitis   • 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[facility])   • Hypoxia   • Chronic bronchitis, unspecified chronic bronchitis type ([facility])   • Left arm pain   • Hematoma   • Continuous opioid dependence ([facility])   • COPD (chronic obstructive pulmonary disease) ([facility])   • Moderate persistent asthma without complication   • Urinary retention   • Peripheral vascular disease ([facility] )   • Appendix disease   • S/P appendectomy   • Pain of upper abdomen   • Left upper quadrant abdominal pain   • Recurrent UTI   • Stroke-like symptoms   • Localized swelling of left lower extremity   • Acute CVA (cerebrovascular accident) ([facility] )   • Carotid artery stenosis   • Stenosis of left carotid artery   • Asymptomatic stenosis of left carotid artery   • Premature atrial contractions   • Acute URI   • Lower abdominal pain   • Hepatic steatosis       Objective   LMP  (LMP Unknown)     Physical Exam  Vitals reviewed  Constitutional:       Appearance: Normal appearance  HENT:      Head: Normocephalic and atraumatic  Eyes:      Extraocular Movements: Extraocular movements intact  Pulmonary:      Effort: Pulmonary effort is normal    Musculoskeletal:         General: Normal range of motion  Cervical back: Normal range of motion  Skin:     Coloration: Skin is not jaundiced or pale  Neurological:      General: No focal deficit present  Mental Status: She is alert and oriented to person, place, and time  Psychiatric:         Mood and Affect: Mood normal          Behavior: Behavior normal          Thought Content:  Thought content normal          Judgment: Judgment normal              Current Medications     Current Outpatient Medications:   •  acetaminophen (TYLENOL) 325 mg tablet, Take 650 mg by mouth as needed for mild pain, Disp: , Rfl:   •  apixaban (Eliquis) 5 mg, Take 1 tablet (5 mg total) by mouth 2 (two) times a day, Disp: 180 tablet, Rfl: 5  •  aspirin 81 mg chewable tablet, Chew 1 tablet (81 mg total) daily, Disp: , Rfl: 0  •  azelastine (OPTIVAR) 0 05 % ophthalmic solution, Administer 1 drop to both eyes 2 (two) times a day, Disp: 6 mL, Rfl: 12  •  butalbital-acetaminophen-caffeine (FIORICET,ESGIC) -40 mg per tablet, Take 1 tablet by mouth every 6 (six) hours as needed for headaches, Disp: 90 tablet, Rfl: 0  •  cetirizine (ZyrTEC) 10 mg tablet, Take 1 tablet (10 mg total) by mouth daily for 365 doses, Disp: 30 tablet, Rfl: 11  •  chlordiazepoxide-clidinium (LIBRAX) 5-2 5 mg per capsule, TAKE 1 CAPSULE BY MOUTH 4 (FOUR) TIMES A DAY (BEFORE MEALS AND AT BEDTIME), Disp: 360 capsule, Rfl: 1  •  chlorhexidine (PERIDEX) 0 12 % solution, , Disp: , Rfl:   •  Cholecalciferol 25 MCG (1000 UT) tablet, Take 1,000 Units by mouth daily, Disp: , Rfl:   •  clotrimazole-betamethasone (LOTRISONE) 1-0 05 % cream, , Disp: , Rfl:   •  desonide (DESOWEN) 0 05 % ointment, Apply topically 2 (two) times a day, Disp: 60 g, Rfl: 1  •  diltiazem (CARDIZEM CD) 120 mg 24 hr capsule, , Disp: , Rfl:   •  diltiazem (DILACOR XR) 120 MG 24 hr capsule, One tablet daily as needed for palpitations, Disp: 30 capsule, Rfl: 5  •  Evolocumab 140 MG/ML SOAJ, Inject 1 mL (140 mg total) under the skin every 14 (fourteen) days, Disp: 2 mL, Rfl: 5  •  fluticasone-vilanterol (BREO ELLIPTA) 200-25 MCG/INH inhaler, Inhale 1 puff daily Rinse mouth after use , Disp: 3 Inhaler, Rfl: 3  •  furosemide (LASIX) 40 mg tablet, Take 1 tablet (40 mg total) by mouth daily, Disp: 90 tablet, Rfl: 0  •  Galcanezumab-gnlm 120 MG/ML SOAJ, Inject 120 mg under the skin every 30 (thirty) days, Disp: 1 mL, Rfl: 11  •  Ginger, Zingiber officinalis, (GINGER PO), Take 1 tablet by mouth in the morning, Disp: , Rfl:   •  hydrOXYzine HCL (ATARAX) 25 mg tablet, Take 2 tablets (50 mg total) by mouth daily at bedtime, Disp: 90 tablet, Rfl: 3  •  ipratropium (ATROVENT) 0 06 % nasal spray, , Disp: , Rfl:   •  levofloxacin (LEVAQUIN) 750 mg tablet, Take 1 tablet (750 mg total) by mouth every 24 hours for 7 days, Disp: 7 tablet, Rfl: 0  •  LORazepam (ATIVAN) 0 5 mg tablet, Take 1 tablet (0 5 mg total) by mouth every 8 (eight) hours as needed for anxiety (Patient not taking: Reported on 12/15/2022), Disp: 90 tablet, Rfl: 0  •  LORazepam (ATIVAN) 1 mg tablet, Take 1 tablet (1 mg total) by mouth every 8 (eight) hours as needed for anxiety, Disp: 90 tablet, Rfl: 0  •  metoprolol succinate (TOPROL-XL) 100 mg 24 hr tablet, TAKE ONE TABLET BY MOUTH TWICE DAILY, Disp: 180 tablet, Rfl: 0  •  montelukast (SINGULAIR) 10 mg tablet, TAKE ONE TABLET BY MOUTH DAILY AT BEDTIME, Disp: 90 tablet, Rfl: 0  •  MULTIPLE VITAMIN PO, Take by mouth, Disp: , Rfl:   •  nystatin (MYCOSTATIN) powder, Apply topically three times a weekly to affected area, Disp: 60 g, Rfl: 3  •  omeprazole (PriLOSEC) 40 MG capsule, Take 1 capsule (40 mg total) by mouth daily, Disp: 309 capsule, Rfl: 4  •  ondansetron (Zofran ODT) 4 mg disintegrating tablet, Take 1 tablet (4 mg total) by mouth every 6 (six) hours as needed for nausea or vomiting, Disp: 60 tablet, Rfl: 2  •  phenazopyridine (PYRIDIUM) 200 mg tablet, Take 1 tablet (200 mg total) by mouth 3 (three) times a day with meals, Disp: 90 tablet, Rfl: 0  •  Probiotic Product (PROBIOTIC-10 PO), Take 1 tablet by mouth in the morning, Disp: , Rfl:   •  spironolactone (ALDACTONE) 25 mg tablet, Take 1 tablet (25 mg total) by mouth 2 (two) times a day, Disp: 180 tablet, Rfl: 0  •  traMADol (ULTRAM) 50 mg tablet, Take 1 tablet (50 mg total) by mouth 4 (four) times a day as needed for severe pain, Disp: 120 tablet, Rfl: 3  •  triamcinolone acetonide (KENALOG-40) 40 mg/mL, , Disp: , Rfl:   •  Turmeric 500 MG CAPS, , Disp: , Rfl:   •  Zinc 100 MG TABS, Take by mouth daily, Disp: , Rfl:     Current Facility-Administered Medications:   •  cyanocobalamin injection 1,000 mcg, 1,000 mcg, Intramuscular, Q30 Days, Luis Manuel Banuelos DO, 1,000 mcg at 10/27/22 1049  •  cyanocobalamin injection 1,000 mcg, 1,000 mcg, Intramuscular, Q30 Days, Luis Manuel Banuelos DO, 1,000 mcg at 11/28/22 1017        Senia Ramirez MD

## 2022-12-28 ENCOUNTER — OFFICE VISIT (OUTPATIENT)
Dept: UROLOGY | Facility: CLINIC | Age: 62
End: 2022-12-28

## 2022-12-28 VITALS
BODY MASS INDEX: 33.12 KG/M2 | HEIGHT: 64 IN | SYSTOLIC BLOOD PRESSURE: 142 MMHG | DIASTOLIC BLOOD PRESSURE: 88 MMHG | HEART RATE: 80 BPM | WEIGHT: 194 LBS

## 2022-12-28 DIAGNOSIS — M79.602 LEFT ARM PAIN: ICD-10-CM

## 2022-12-28 DIAGNOSIS — R39.82 CHRONIC BLADDER PAIN: ICD-10-CM

## 2022-12-28 DIAGNOSIS — N30.00 ACUTE CYSTITIS WITHOUT HEMATURIA: Primary | ICD-10-CM

## 2022-12-28 DIAGNOSIS — N30.10 INTERSTITIAL CYSTITIS: ICD-10-CM

## 2022-12-28 DIAGNOSIS — N39.0 RECURRENT UTI: Primary | ICD-10-CM

## 2022-12-28 LAB — BACTERIA UR CULT: ABNORMAL

## 2022-12-28 RX ORDER — PHENAZOPYRIDINE HYDROCHLORIDE 200 MG/1
200 TABLET, FILM COATED ORAL 3 TIMES DAILY PRN
Qty: 30 TABLET | Refills: 1 | Status: SHIPPED | OUTPATIENT
Start: 2022-12-28

## 2022-12-28 RX ORDER — CIPROFLOXACIN 500 MG/1
500 TABLET, FILM COATED ORAL EVERY 12 HOURS SCHEDULED
Qty: 6 TABLET | Refills: 0 | Status: SHIPPED | OUTPATIENT
Start: 2022-12-28 | End: 2022-12-31

## 2022-12-28 RX ORDER — METHENAMINE HIPPURATE 1000 MG/1
1 TABLET ORAL 2 TIMES DAILY WITH MEALS
Qty: 180 TABLET | Refills: 3 | Status: SHIPPED | OUTPATIENT
Start: 2022-12-28

## 2022-12-28 NOTE — PATIENT INSTRUCTIONS
Take pyridium, methenamine with 1000 mg Vit c daily, and hydroxyzine  There is a standing urine culture for you to have a urine culture done as often as every 4 weeks  If you feel the symptoms of urinary tract infection, go to your nearest [facility]'s lab and have a urine culture done which is already ordered  We will base treatment upon the results

## 2022-12-29 ENCOUNTER — TELEPHONE (OUTPATIENT)
Dept: UROLOGY | Facility: CLINIC | Age: 62
End: 2022-12-29

## 2022-12-29 NOTE — TELEPHONE ENCOUNTER
Called and spoke to pt can clarified NP's message on what medication she should take   Pt had no additional questions

## 2022-12-29 NOTE — TELEPHONE ENCOUNTER
----- Message from Prem Bolanos sent at 12/29/2022 10:37 AM EST -----  Regarding: Levofloxin  [facility] Contact: Louis Pearson so I was just looking at the summary from yesterday with the appointment I had with you and it says to take the Cipro    okay I'm a little confused because I thought I was supposed to finish the leveofloxin  I know I'm feeling much much better with just two days  Of the levofloxin  or did you want me to take both I don't think so but I just want to make sure  After just reading the summary    [facility] So today would be day three on the levofloxzine  Please let me know what to do and then I was wondering with the culture if it showed which antibiotics I could use and could not use    Curiosity of my mind because of the resistance I had to all the antibiotics  Have a good day and stay safe thank you

## 2022-12-29 NOTE — TELEPHONE ENCOUNTER
Called the patient and left a voicemail stating to continue the Levaquin and DO NOT take the Cipro  Asked patient to call the office with any questions

## 2022-12-29 NOTE — TELEPHONE ENCOUNTER
Patient called about date for her surgery with Dr Altagracia Mcnamara I told her that she needs to get CC before we can schedule her surgery patient will call her cardiologist Dr Sheppard Hearing to get appointment and call us back with her date LLF

## 2022-12-29 NOTE — TELEPHONE ENCOUNTER
I reviewed Dr Murphy House office note from yesterday  Patient should continue with Levofloxacin as previously prescribed and NOT take the Ciprofloxacin  C&S showed susceptibility to Levofloxacin and Ciprofloxacin was likely ordered in error

## 2023-01-03 ENCOUNTER — PATIENT MESSAGE (OUTPATIENT)
Dept: UROLOGY | Facility: CLINIC | Age: 63
End: 2023-01-03

## 2023-01-03 DIAGNOSIS — B37.9 YEAST INFECTION: Primary | ICD-10-CM

## 2023-01-03 RX ORDER — FLUCONAZOLE 150 MG/1
150 TABLET ORAL
Qty: 2 TABLET | Refills: 0 | Status: SHIPPED | OUTPATIENT
Start: 2023-01-03 | End: 2023-01-07

## 2023-01-09 ENCOUNTER — TELEPHONE (OUTPATIENT)
Dept: DERMATOLOGY | Facility: CLINIC | Age: 63
End: 2023-01-09

## 2023-01-09 ENCOUNTER — OFFICE VISIT (OUTPATIENT)
Dept: CARDIOLOGY CLINIC | Facility: CLINIC | Age: 63
End: 2023-01-09

## 2023-01-09 VITALS
WEIGHT: 192 LBS | SYSTOLIC BLOOD PRESSURE: 124 MMHG | DIASTOLIC BLOOD PRESSURE: 86 MMHG | BODY MASS INDEX: 32.78 KG/M2 | HEART RATE: 64 BPM | HEIGHT: 64 IN

## 2023-01-09 DIAGNOSIS — L30.4 INTERTRIGO: Primary | ICD-10-CM

## 2023-01-09 DIAGNOSIS — I48.0 PAF (PAROXYSMAL ATRIAL FIBRILLATION) (HCC): Primary | ICD-10-CM

## 2023-01-09 NOTE — TELEPHONE ENCOUNTER
Patient left a message that she was on Levaquin and started with a yeast infection after and was prescribed Diflucan but it did not help and that she has what appears to be yeast/ rash under breasts  Which she has been using the desonide  A refill was ordered on 12/5/22 which required a PA  PA was denied  Patient is about out of desonide and asking what she should be using  Denial for desonide scan dated 1/5/23

## 2023-01-09 NOTE — PROGRESS NOTES
Electrophysiology Office Note    Dk Walker  1960  [id number]  HEART & VASCULAR [facility]  [facility] [phone]        Assessment/Plan     Primary diagnosis:   1  Paroxysmal atrial fibrillation, symptomatic    * patient presents to office today for pre op risk stratification for CEA and for atrial fibrillation management    * at last visit she was initiated on flecainide 50mg BID due to symptomatic atrial fibrillation on zio patch (had 3% burden)  She could not tolerate flecainide due to increase in palpitations so she discontinued after a few days  * has been having intermittent palpitations being off flecainide similar to when wearing the zio patch  she has been taking 120mg of diltiazem sporadically on days when palpitations are worse  Does admit to anxiety contributing to palpitations  * we discussed next steps in atrial fibrillation management such as switching to Class III AAD vs ablation vs taking diltiazem daily vs doing nothing  She is hesitant to add medications as she does not want to be dependent on higher and higher doses but also does not want to do any invasive procedures such as ablation yet  * due to upcoming CEA procedure for now will add diltiazem 120mg daily to her regimen  Will f/u with her in march to re-discuss symptoms and possible atrial fibrillation ablation    * we discuss weight loss today  She has lost a considerable amount of weight through diet  She used to weigh 250lbs! Down to 192lbs  Encouraged her to continue  * of note her main concern is she feels her providers do not explain things to her  We spent a considerable amount of time on education today  I did explain atrial fibrillation is a disease and at this time cannot be cured  She was taken aback by the use of the word "disease "     2  Pre op risk stratification:    * patient recommended to have left CEA through [facility]      * patient expresses concerns of recent worsening WELLINGTON when carrying laundry up the stairs but does live more of a sedentary lifestyle  Will repeat NM stress test  If negative, low risk for periop complications  Can hold eliquis 48-72 hours pre surgery  * will f/u with her and vascular team on NM results once completed      * she is going to be turning 63 in February  Right now she is on medicaid with all her medications being affordable  I want her to call medicare and discuss costs of her medications as well as copays so she is clear on the costs  Stressed she spend time to do this right away as she is not working at the moment*     Secondary diagnosis:   1  Hx many med intolerances   2  Obesity   3  HLD   4  CAMILLE on CPAP   5  Hx SMA thrombosis s/p PCI ()           Rhythm History:   Atrial fibrillation:   1  Diagnosed with atrial fibrillation on holter - 3/2020 - started on eliquis by Dr Thong Zapata   2  Concerns of palpitations - zio ordered - 3% afib burden found with frequent symptoms during afib episodes - referred to EP for rhythm control - 10/2022   3  Evaluated by Dr Georgina Nevarez - flecainide 50mg BID recommended - 2022   4  Could not tolerate flecainide - patient discontinued - 2022   5   Continues with palpitations - daily diltiazem 120mg QDay added to regimen - 2023     Atrial flutter:     SVT:     VT/VF/PVC:     Device history:   Pacemaker:    Defibrillator:    BIV PPM:    BIV ICD:    ILR:      Cardiac Testing:     ECHO: Results for orders placed during the hospital encounter of 20    Echo complete with contrast if indicated    Narrative  [address]  [phone]    Transthoracic Echocardiogram  2D, M-mode, Doppler, and Color Doppler    Study date:  06-Mar-2020    Patient: Wilfredo Dye  MR number: [id number]  Account number: [de-identified]  : 1960  Age: 56 years  Gender: Female  Status: Outpatient  Location: [address] [place] [address] and Vascular Center  Height: 64 in  Weight: 207 5 lb  BP: 124/ 76 mmHg    Indications: Hypertension    Diagnoses: I11 9 - Hypertensive heart disease without heart failure    Sonographer:  Jazmyn Webster RDCS  Primary Physician:  Tash Freedman DO  Referring Physician:  Kristie Robert MD  Group:  [id number]  Interpreting Physician:  Jane Narayanan MD    SUMMARY    LEFT VENTRICLE:  Systolic function was normal  Ejection fraction was estimated to be 60 %  There were no regional wall motion abnormalities  Wall thickness was at the upper limits of normal     MITRAL VALVE:  There was mild annular calcification  There was trace regurgitation  AORTIC VALVE:  There was mild to moderate regurgitation  TRICUSPID VALVE:  There was mild regurgitation  Pulmonary artery systolic pressure was within the normal range  PULMONIC VALVE:  There was mild regurgitation  COMPARISONS:  There has been no significant interval change  Comparison was made with the previous study of 19-Oct-2018  HISTORY: PRIOR HISTORY: Asthma, GERD, Murmur, Hyperlipidemia, Hypertension, Former Smoker, Obesity    PROCEDURE: The study was performed in the [address]  This was a routine study  The transthoracic approach was used  The study included complete 2D imaging, M-mode, complete spectral Doppler, and color Doppler  The  heart rate was 64 bpm, at the start of the study  Images were obtained from the parasternal, apical, subcostal, and suprasternal notch acoustic windows  Image quality was adequate  LEFT VENTRICLE: Size was normal  Systolic function was normal  Ejection fraction was estimated to be 60 %  There were no regional wall motion abnormalities  Wall thickness was at the upper limits of normal  No evidence of apical thrombus    DOPPLER: Left ventricular diastolic function parameters were normal     RIGHT VENTRICLE: The size was normal  Systolic function was normal  Wall thickness was normal     LEFT ATRIUM: Size was at the upper limits of normal     RIGHT ATRIUM: Size was at the upper limits of normal     MITRAL VALVE: There was mild annular calcification  Valve structure was normal  There was mild thickening  There was normal leaflet separation  DOPPLER: The transmitral velocity was within the normal range  There was no evidence for  stenosis  There was trace regurgitation  AORTIC VALVE: The valve was trileaflet  Leaflets exhibited mildly increased thickness and normal cuspal separation  DOPPLER: Transaortic velocity was within the normal range  There was no evidence for stenosis  There was mild to moderate  regurgitation  TRICUSPID VALVE: The valve structure was normal  There was normal leaflet separation  DOPPLER: The transtricuspid velocity was within the normal range  There was no evidence for stenosis  There was mild regurgitation  Pulmonary artery  systolic pressure was within the normal range  PULMONIC VALVE: Leaflets exhibited normal thickness, no calcification, and normal cuspal separation  DOPPLER: The transpulmonic velocity was within the normal range  There was mild regurgitation  PERICARDIUM: There was no pericardial effusion  The pericardium was normal in appearance  AORTA: The root exhibited normal size  SYSTEMIC VEINS: IVC: The inferior vena cava was normal in size      SYSTEM MEASUREMENT TABLES    2D  %FS: 28 32 %  Ao Diam: 3 21 cm  EDV(Teich): 154 62 ml  EF(Cube): 63 16 %  EF(Teich): 54 05 %  ESV(Cube): 65 22 ml  ESV(Teich): 71 05 ml  IVSd: 1 12 cm  LA Area: 20 38 cm2  LA Diam: 3 9 cm  LVEDV MOD A4C: 127 87 ml  LVEF MOD A4C: 69 44 %  LVESV MOD A4C: 39 07 ml  LVIDd: 5 62 cm  LVIDs: 4 03 cm  LVLd A4C: 7 57 cm  LVLs A4C: 6 03 cm  LVPWd: 1 cm  RA Area: 18 97 cm2  RV Diam : 3 23 cm  SI(Cube): 56 2 ml/m2  SI(Teich): 42 ml/m2  SV MOD A4C: 88 8 ml  SV(Cube): 111 83 ml  SV(Teich): 83 57 ml    CW  AR Dec [place]: 2 45 m/s2  AR Dec Time: 2033 69 ms  AR PHT: 589 77 ms  AR Vmax: 4 99 m/s  AR maxP mmHg  TR Vmax: 2 41 m/s  TR maxP 3 mmHg    MM  TAPSE: 2 22 cm    PW  E': 0 07 m/s  E/E': 14 11  MV A Johan: 0 63 m/s  MV Dec Hick[place]: 5 45 m/s2  MV DecT: 176 78 ms  MV E Johan: 0 96 m/s  MV E/A Ratio: 1 53    [facility] Accredited [facility]chocardi[facility]    Prepared and electronically signed by    Jose Diaz MD  Signed 06-Mar-2020 14:14:41        History of Present Illness     HPI/INTERVAL HISTORY:  Since last office visit patient does have a few concerns  1  Still having palpitations  Does take dilitazem sporadically as she does not want to be dependent on more medications  Did not tolerate flecainide  2  Has need for CEA  Needs pre op clearance  Is having WELLINGTON recently when walking up the basement stairs carrying laundry  Lives sedentary lifestyle  No chest pressure with exertion  3  Feels as if many health care providers do not explain anything to her about her healthcare diagnosis     4  Does have chest burning 30 minutes to 1 hour after eating certain foods or eating too much  Review of Systems  ROS as noted above, otherwise 12 point review of systems was performed and is negative         Historical Information   Social History     Socioeconomic History   • Marital status:      Spouse name: Not on file   • Number of children: 0   • Years of education: Not on file   • Highest education level: Not on file   Occupational History   • Occupation: unemployed   Tobacco Use   • Smoking status: Former     Packs/day: 0 50     Years: 10 00     Pack years: 5 00     Types: Cigarettes     Quit date: 2019     Years since quittin 0   • Smokeless tobacco: Never   Vaping Use   • Vaping Use: Never used   Substance and Sexual Activity   • Alcohol use: Never   • Drug use: No   • Sexual activity: Not Currently   Other Topics Concern   • Not on file   Social History Narrative    Who lives in your home: Alone     What type of home do you live in: [address] Age of your home: 1952 built     How long have you been living there: 5 yrs     Type of heat: forced hot air     Type of fuel: electric     What type of jamin is in your bedroom: carpet jamin     Do you have the following in or near your home:    Air products: Humidifier in the bedroom/kitchen     Pests: none     Pets: none     Are pets allowed in bedroom: N/A     Open fields, wooded areas nearby: No     Basement: [id number]-no mold     Exposure to second hand smoke: No    Central air     Habits:    Caffeine: Hot tea 1 cup daily- ice tea 1 cup in the afternoon    Chocolate: Occasionally      Social Determinants of Health     Financial Resource Strain: Not on file   Food Insecurity: No Food Insecurity   • Worried About Running Out of Food in the Last Year: Never true   • Ran Out of Food in the Last Year: Never true   Transportation Needs: No Transportation Needs   • Lack of Transportation (Medical): No   • Lack of Transportation (Non-Medical): No   Physical Activity: Not on file   Stress: Not on file   Social Connections: Not on file   Intimate Partner Violence: Not on file   Housing Stability: Low Risk    • Unable to Pay for Housing in the Last Year: No   • Number of Places Lived in the Last Year: 1   • Unstable Housing in the Last Year: No     Past Medical History:   Diagnosis Date   • [facility]   • Allergic    • Anxiety    • Arthritis    • Asthma    • Colon polyp    • Depression    • GERD (gastroesophageal reflux disease)    • Heart murmur    • Hives    • Hyperlipidemia    • Hypertension    • Infertility, female    • Migraine    • Palpitations    • Psychiatric disorder    • Wears glasses      Past Surgical History:   Procedure Laterality Date   • COLONOSCOPY     • EGD     • EXPLORATORY LAPAROTOMY      for infertility   • HAND SURGERY      Hand excision of tendon cyst   • NH LAPAROSCOPIC APPENDECTOMY N/A 5/3/2022    Procedure: APPENDECTOMY LAPAROSCOPIC;  Surgeon:  Hetal Engel MD; Location: [facility];  Service: General   • SUPERIOR MESTENTERIC ARTERY STENT     • WISDOM TOOTH EXTRACTION       Social History     Substance and Sexual Activity   Alcohol Use Never     Social History     Substance and Sexual Activity   Drug Use No     Social History     Tobacco Use   Smoking Status Former   • Packs/day: 0 50   • Years: 10 00   • Pack years: 5 00   • Types: Cigarettes   • Quit date:    • Years since quittin 0   Smokeless Tobacco Never     Family History   Problem Relation Age of Onset   • Lung cancer Mother 61   • Brain cancer Mother 61   • Diabetes Father    • Depression Father    • Other Father         septic   • Allergies Sister    • Hashimoto's thyroiditis Sister    • Abdominal aortic aneurysm Sister    • Diabetes Sister    • No Known Problems Sister    • No Known Problems Maternal Grandmother    • No Known Problems Maternal Grandfather    • No Known Problems Paternal Grandmother    • No Known Problems Paternal Grandfather    • Hodgkin's lymphoma Brother    • No Known Problems Brother    • No Known Problems Maternal Aunt    • Diabetes Other        Meds/Allergies       Current Outpatient Medications:   •  acetaminophen (TYLENOL) 325 mg tablet, Take 650 mg by mouth as needed for mild pain, Disp: , Rfl:   •  apixaban (Eliquis) 5 mg, Take 1 tablet (5 mg total) by mouth 2 (two) times a day, Disp: 180 tablet, Rfl: 5  •  aspirin 81 mg chewable tablet, Chew 1 tablet (81 mg total) daily, Disp: , Rfl: 0  •  azelastine (OPTIVAR) 0 05 % ophthalmic solution, Administer 1 drop to both eyes 2 (two) times a day, Disp: 6 mL, Rfl: 12  •  butalbital-acetaminophen-caffeine (FIORICET,ESGIC) -40 mg per tablet, Take 1 tablet by mouth every 6 (six) hours as needed for headaches, Disp: 90 tablet, Rfl: 0  •  cetirizine (ZyrTEC) 10 mg tablet, Take 1 tablet (10 mg total) by mouth daily for 365 doses, Disp: 30 tablet, Rfl: 11  •  chlordiazepoxide-clidinium (LIBRAX) 5-2 5 mg per capsule, TAKE 1 CAPSULE BY MOUTH 4 (FOUR) TIMES A DAY (BEFORE MEALS AND AT BEDTIME), Disp: 360 capsule, Rfl: 1  •  chlorhexidine (PERIDEX) 0 12 % solution, , Disp: , Rfl:   •  Cholecalciferol 25 MCG (1000 UT) tablet, Take 1,000 Units by mouth daily, Disp: , Rfl:   •  clotrimazole-betamethasone (LOTRISONE) 1-0 05 % cream, , Disp: , Rfl:   •  desonide (DESOWEN) 0 05 % ointment, Apply topically 2 (two) times a day, Disp: 60 g, Rfl: 1  •  diltiazem (CARDIZEM CD) 120 mg 24 hr capsule, , Disp: , Rfl:   •  diltiazem (DILACOR XR) 120 MG 24 hr capsule, One tablet daily as needed for palpitations, Disp: 30 capsule, Rfl: 5  •  Evolocumab 140 MG/ML SOAJ, Inject 1 mL (140 mg total) under the skin every 14 (fourteen) days, Disp: 2 mL, Rfl: 5  •  fluticasone-vilanterol (BREO ELLIPTA) 200-25 MCG/INH inhaler, Inhale 1 puff daily Rinse mouth after use , Disp: 3 Inhaler, Rfl: 3  •  furosemide (LASIX) 40 mg tablet, Take 1 tablet (40 mg total) by mouth daily, Disp: 90 tablet, Rfl: 0  •  Galcanezumab-gnlm 120 MG/ML SOAJ, Inject 120 mg under the skin every 30 (thirty) days, Disp: 1 mL, Rfl: 11  •  Ginger, Zingiber officinalis, (GINGER PO), Take 1 tablet by mouth in the morning, Disp: , Rfl:   •  hydrOXYzine HCL (ATARAX) 25 mg tablet, Take 2 tablets (50 mg total) by mouth daily at bedtime, Disp: 90 tablet, Rfl: 3  •  ipratropium (ATROVENT) 0 06 % nasal spray, , Disp: , Rfl:   •  LORazepam (ATIVAN) 1 mg tablet, Take 1 tablet (1 mg total) by mouth every 8 (eight) hours as needed for anxiety, Disp: 90 tablet, Rfl: 0  •  methenamine hippurate (HIPREX) 1 g tablet, Take 1 tablet (1 g total) by mouth 2 (two) times a day with meals, Disp: 180 tablet, Rfl: 3  •  metoprolol succinate (TOPROL-XL) 100 mg 24 hr tablet, TAKE ONE TABLET BY MOUTH TWICE DAILY, Disp: 180 tablet, Rfl: 0  •  montelukast (SINGULAIR) 10 mg tablet, TAKE ONE TABLET BY MOUTH DAILY AT BEDTIME, Disp: 90 tablet, Rfl: 0  •  MULTIPLE VITAMIN PO, Take by mouth, Disp: , Rfl:   •  nystatin (MYCOSTATIN) powder, Apply topically three times a weekly to affected area, Disp: 60 g, Rfl: 3  •  omeprazole (PriLOSEC) 40 MG capsule, Take 1 capsule (40 mg total) by mouth daily, Disp: 309 capsule, Rfl: 4  •  ondansetron (Zofran ODT) 4 mg disintegrating tablet, Take 1 tablet (4 mg total) by mouth every 6 (six) hours as needed for nausea or vomiting, Disp: 60 tablet, Rfl: 2  •  phenazopyridine (PYRIDIUM) 200 mg tablet, Take 1 tablet (200 mg total) by mouth 3 (three) times a day with meals, Disp: 90 tablet, Rfl: 0  •  phenazopyridine (PYRIDIUM) 200 mg tablet, Take 1 tablet (200 mg total) by mouth 3 (three) times a day as needed for bladder spasms, Disp: 30 tablet, Rfl: 1  •  Probiotic Product (PROBIOTIC-10 PO), Take 1 tablet by mouth in the morning, Disp: , Rfl:   •  spironolactone (ALDACTONE) 25 mg tablet, Take 1 tablet (25 mg total) by mouth 2 (two) times a day, Disp: 180 tablet, Rfl: 0  •  traMADol (ULTRAM) 50 mg tablet, Take 1 tablet (50 mg total) by mouth 4 (four) times a day as needed for severe pain, Disp: 120 tablet, Rfl: 3  •  triamcinolone acetonide (KENALOG-40) 40 mg/mL, , Disp: , Rfl:   •  Turmeric 500 MG CAPS, , Disp: , Rfl:   •  Zinc 100 MG TABS, Take by mouth daily, Disp: , Rfl:   •  LORazepam (ATIVAN) 0 5 mg tablet, Take 1 tablet (0 5 mg total) by mouth every 8 (eight) hours as needed for anxiety (Patient not taking: Reported on 12/15/2022), Disp: 90 tablet, Rfl: 0    Current Facility-Administered Medications:   •  cyanocobalamin injection 1,000 mcg, 1,000 mcg, Intramuscular, Q30 Days, Dianne Graft, DO, 1,000 mcg at 10/27/22 1049  •  cyanocobalamin injection 1,000 mcg, 1,000 mcg, Intramuscular, Q30 Days, Dianne Graft, DO, 1,000 mcg at 11/28/22 1017    Allergies   Allergen Reactions   • Ace Inhibitors Angioedema and Anaphylaxis     Anaphylaxis   • Hydralazine Other (See Comments)     Lip swelling  Flank pain   • Other Anaphylaxis and Other (See Comments)     Other reaction(s): angioadema  Other reaction(s):  Other (See Comments)  Preservatives- itching throat closes, hi  Other reaction(s): angioadema  E Z Cat - hives throat closes itching,  Preservatives- itching throat closes, hi  Artificial sweeteners   • Valsartan Angioedema     Lips, face swollen   • Ampicillin Hives     Depends on brand some preservatives can react  Has recently tolerated oral amoxicillin  • Benicar [Olmesartan] Angioedema     See Allergy note from 9/11/2008  Swollen ankles/legs   • Sulfa Antibiotics Hives     stuffiness,itching,hives,throat closing       Objective   Vitals: Blood pressure 124/86, pulse 64, height 5' 4" (1 626 m), weight 87 1 kg (192 lb), not currently breastfeeding  Physical Exam  Constitutional:       Appearance: She is well-developed  HENT:      Head: Normocephalic and atraumatic  Eyes:      Pupils: Pupils are equal, round, and reactive to light  Cardiovascular:      Rate and Rhythm: Normal rate and regular rhythm  Pulmonary:      Effort: Pulmonary effort is normal       Breath sounds: Normal breath sounds  Abdominal:      General: Bowel sounds are normal       Palpations: Abdomen is soft  Musculoskeletal:         General: Normal range of motion  Cervical back: Normal range of motion and neck supple  Skin:     General: Skin is warm and dry  Neurological:      Mental Status: She is alert and oriented to person, place, and time             Labs:  Appointment on 12/26/2022   Component Date Value   • Urine Culture 12/26/2022 >100,000 cfu/ml Proteus mirabilis (A)    Nurse Triage on 12/22/2022   Component Date Value   • Color, UA 12/26/2022 Yellow    • Clarity, UA 12/26/2022 Cloudy    • Specific Gravity, UA 12/26/2022 1 010    • pH, UA 12/26/2022 6 5    • Leukocytes, UA 12/26/2022 Moderate (A)    • Nitrite, UA 12/26/2022 Positive (A)    • Protein, UA 12/26/2022 Negative    • Glucose, UA 12/26/2022 Negative    • Ketones, UA 12/26/2022 Negative    • Urobilinogen, UA 12/26/2022 0 2    • Bilirubin, UA 12/26/2022 Negative    • Occult Blood, UA 12/26/2022 Trace-lysed (A)    • RBC, UA 12/26/2022 0-1 (A)    • WBC, UA 12/26/2022 Innumerable (A)    • Epithelial Cells 12/26/2022 Occasional    • Bacteria, UA 12/26/2022 Innumerable (A)    • WBC Clumps 12/26/2022 present    Hospital Outpatient Visit on 12/16/2022   Component Date Value   • A4C EF 12/16/2022 52    • LV Diastolic Volume (BP) 68/66/2673 725    • LV Systolic Volume (BP) 76/18/3262 67    • EF 12/16/2022 51    • LVIDd 12/16/2022 4 20    • LVIDS 12/16/2022 3 00    • IVSd 12/16/2022 1 70    • LVPWd 12/16/2022 1 40    • FS 12/16/2022 29    • MV E' Tissue Velocity Se* 12/16/2022 6    • E wave deceleration time 12/16/2022 223    • MV Peak E Johan 12/16/2022 71    • MV Peak A Johan 12/16/2022 0 73    • LA size 12/16/2022 3 9    • AV Deceleration Time 12/16/2022 2,866    • AV regurgitation pressur* 12/16/2022 831    • MV stenosis pressure 1/2* 12/16/2022 65    • MV valve area p 1/2 meth* 12/16/2022 3 38    • Ao root 12/16/2022 3 60    • AV peak gradient 12/16/2022 52    • Left ventricular stroke * 12/16/2022 44 00    • IVS 12/16/2022 1 7    • LEFT VENTRICLE SYSTOLIC * 28/74/6607 36    • LV DIASTOLIC VOLUME (MOD* 39/13/3342 80    • LVSV, 2D 12/16/2022 44    • LV EF 12/16/2022 55    Office Visit on 12/13/2022   Component Date Value   • SARS-CoV-2 12/13/2022 Negative    • INFLUENZA A PCR 12/13/2022 Negative    • INFLUENZA B PCR 12/13/2022 Negative    Appointment on 11/29/2022   Component Date Value   • Urine Culture 11/29/2022 >100,000 cfu/ml Proteus mirabilis (A)    Telephone on 11/22/2022   Component Date Value   • Color, UA 11/29/2022 Orange    • Clarity, UA 11/29/2022 Clear    • Specific Gravity, UA 11/29/2022 1 015    • pH, UA 11/29/2022 5 5    • Leukocytes, UA 11/29/2022 Moderate (A)    • Nitrite, UA 11/29/2022 Positive (A)    • Protein, UA 11/29/2022 Negative    • Glucose, UA 11/29/2022 100 (1/10%) (A)    • Ketones, UA 11/29/2022 Negative    • Urobilinogen, UA 11/29/2022 1 0    • Bilirubin, UA 11/29/2022 Negative    • Occult Blood, UA 11/29/2022 Moderate (A)    • RBC, UA 11/29/2022 4-10 (A)    • WBC, UA 11/29/2022 30-50 (A)    • Epithelial Cells 11/29/2022 Occasional    • Bacteria, UA 11/29/2022 Occasional    • OTHER OBSERVATIONS 11/29/2022 WBCs Clumped        Imaging: I have personally reviewed pertinent reports

## 2023-01-10 NOTE — TELEPHONE ENCOUNTER
Spoke with patient and advised her to use the Ketoconazole Cream twice a day to under breasts  Patient states she tried that with no relief  Used it once a day for maybe 4 days  I advised that we are recommending twice a day and that Ketoconazole Cream is safe to use for longer term as there is no steroid in it  Patient is asking for a smaller quantity of Desonide be sent to the pharmacy as she feels that is why her insurance denied it  I advised that denial states trial and failure of derma smooth, hydrocortisone cream/ ointment  Patient states those do not help her

## 2023-01-12 NOTE — TELEPHONE ENCOUNTER
Nolvia: I prescribed triamcinolone ointment (which is very similar to desonide) to her [employer]  She can use it 2x/day for 4 days to see if it helps  She should not use it longer  Please let her know  Thank you

## 2023-01-17 ENCOUNTER — HOSPITAL ENCOUNTER (OUTPATIENT)
Dept: NON INVASIVE DIAGNOSTICS | Facility: CLINIC | Age: 63
Discharge: HOME | End: 2023-01-17

## 2023-01-17 DIAGNOSIS — I48.0 PAF (PAROXYSMAL ATRIAL FIBRILLATION) (HCC): ICD-10-CM

## 2023-01-17 LAB
NUC STRESS EJECTION FRACTION: 71 %
RATE PRESSURE PRODUCT: 9516
SL CV REST NUCLEAR ISOTOPE DOSE: 10.71 MCI
SL CV STRESS NUCLEAR ISOTOPE DOSE: 32.6 MCI
SL CV STRESS RECOVERY BP: NORMAL MMHG
SL CV STRESS RECOVERY HR: 73 BPM
SL CV STRESS RECOVERY O2 SAT: 96 %
STRESS ANGINA INDEX: 0
STRESS BASELINE BP: NORMAL MMHG
STRESS BASELINE HR: 63 BPM
STRESS O2 SAT REST: 99 %
STRESS PEAK HR: 78 BPM
STRESS POST O2 SAT PEAK: 99 %
STRESS POST PEAK BP: 122 MMHG
STRESS ST DEPRESSION: 0 MM
STRESS/REST PERFUSION RATIO: 1.13

## 2023-01-17 RX ADMIN — REGADENOSON 0.4 MG: 0.08 INJECTION, SOLUTION INTRAVENOUS at 10:29

## 2023-01-18 LAB
CHEST PAIN STATEMENT: NORMAL
MAX DIASTOLIC BP: 84 MMHG
MAX HEART RATE: 79 BPM
MAX PREDICTED HEART RATE: 156 BPM
MAX. SYSTOLIC BP: 142 MMHG
PROTOCOL NAME: NORMAL
REASON FOR TERMINATION: NORMAL
TARGET HR FORMULA: NORMAL
TEST INDICATION: NORMAL
TIME IN EXERCISE PHASE: NORMAL

## 2023-01-18 NOTE — TELEPHONE ENCOUNTER
Hi doc,   Is this pt cleared for upcoming procedure?     (CEA) Carotid Endarterectomy / Patch Angioplasty      She's on aspirin and Eliquis

## 2023-01-19 ENCOUNTER — TELEPHONE (OUTPATIENT)
Dept: CARDIOLOGY CLINIC | Facility: CLINIC | Age: 63
End: 2023-01-19

## 2023-01-19 DIAGNOSIS — J30.1 SEASONAL ALLERGIC RHINITIS DUE TO POLLEN: ICD-10-CM

## 2023-01-19 RX ORDER — MONTELUKAST SODIUM 10 MG/1
TABLET ORAL
Qty: 90 TABLET | Refills: 0 | Status: SHIPPED | OUTPATIENT
Start: 2023-01-19

## 2023-01-19 NOTE — TELEPHONE ENCOUNTER
Allie Gutiérrez       12:30 PM  Note   Pt calling for results of nuclear stress test done 1/17/23  Looks like ordered by sacha Friend , but patient sees Dr Joy Mckinnon and Dr Suzi Stevens  Stress done for she states Vascular sx with Dr Adelina Caputo Please advise                 12:31 PM  Allie Gutiérrez routed this conversation to Sarah Cisneros PA-C • Servando Salomon DO • DO Jami Dickey DO  to Allie Gutiérrez • Sarah Cisneros PA-C       2:14 PM  Note   Please let pt know stress test was OK>  She should be cleared for surgery  Thanks  Kenneth Saini, thanks for your help w/ this  Jami Salomon DO  to Yuan Cox • Sarah Cisneros PA-C • Rommel Ren DO      3:29 PM  Thank you!

## 2023-01-19 NOTE — TELEPHONE ENCOUNTER
Please let pt know stress test was OK>  She should be cleared for surgery  Thanks  Feli Douglas, thanks for your help w/ robin Farrell

## 2023-01-19 NOTE — TELEPHONE ENCOUNTER
Pt calling for results of nuclear stress test done 1/17/23  Looks like ordered by sacha Friend , but patient sees Dr Shirley Maradiaga and Dr Sriram Aranda  Stress done for she states Vascular sx with Dr Allan Thompson Please advise

## 2023-01-20 ENCOUNTER — PREP FOR PROCEDURE (OUTPATIENT)
Dept: VASCULAR SURGERY | Facility: CLINIC | Age: 63
End: 2023-01-20

## 2023-01-20 NOTE — TELEPHONE ENCOUNTER
Authorization requirements reviewed  Please refer to Ben Rivas / Troy Jimenez number [id number] for case updates

## 2023-01-20 NOTE — TELEPHONE ENCOUNTER
Verified patient's insurance   CONFIRMED - Patient's insurance is [employer]  Is patient requesting a call when authorization has been obtained? Patient did not request a call  Surgery Date: 1/31/23  Primary Surgeon: Palma Dorantes // Thong Avila (NPI: [id number])  Assisting Surgeon: Not Applicable (N/A)  Facility: [facility] (Tax: [id number] / NPI: [id number])  Inpatient / Outpatient: Inpatient  Level: 3    Clearance Received: Yes, Cardiology   Consent Received: Yes, scanned into Epic on 1/20/23  Medication Hold / Last Dose: Eliquis:  Hold for 3 days prior to procedure Aspirin:   Continue (do not hold)  VQI Spreadsheet: 1/20/23  IR Notified: Not Applicable (N/A)  Rep  Notified: Not Applicable (N/A)  Equipment Needs: Equipment Needs: Product/Implant: bovine patch   [facility] Lab Requested: emailed sent 1/20/23  Patient Contacted: 1/20/23    Diagnosis: I65 22  Procedure/ CPT Code(s): (CEA) Carotid Endarterectomy / Patch Angioplasty // CPT: 39670    For varicose vein related procedures:   Last LEVDR: Not Applicable (N/A)  CEAP Classification: Not Applicable (N/A)  VCSS: Not Applicable (N/A)    Post Operative Date/ Time: 2/15/23  , 930am David with JUD Rangel (NPI: [id number])     *Please review medication hold(s), PATs, and check H&P with patient  *  PATIENT WAS MAILED SURGERY/SHOWERING/DISCHARGE/COVID INSTRUCTIONS AFTER REVIEWING WITH THEM VIA PHONE CALL

## 2023-01-21 DIAGNOSIS — N30.10 INTERSTITIAL CYSTITIS: ICD-10-CM

## 2023-01-23 ENCOUNTER — APPOINTMENT (OUTPATIENT)
Dept: LAB | Facility: HOSPITAL | Age: 63
End: 2023-01-23

## 2023-01-23 DIAGNOSIS — I65.22 STENOSIS OF LEFT CAROTID ARTERY: ICD-10-CM

## 2023-01-23 LAB
ABO GROUP BLD: NORMAL
ANION GAP SERPL CALCULATED.3IONS-SCNC: 10 MMOL/L (ref 4–13)
BLD GP AB SCN SERPL QL: NEGATIVE
BUN SERPL-MCNC: 23 MG/DL (ref 5–25)
CALCIUM SERPL-MCNC: 9.5 MG/DL (ref 8.4–10.2)
CHLORIDE SERPL-SCNC: 100 MMOL/L (ref 96–108)
CO2 SERPL-SCNC: 30 MMOL/L (ref 21–32)
CREAT SERPL-MCNC: 0.99 MG/DL (ref 0.6–1.3)
ERYTHROCYTE [DISTWIDTH] IN BLOOD BY AUTOMATED COUNT: 12.2 % (ref 11.6–15.1)
GFR SERPL CREATININE-BSD FRML MDRD: 60 ML/MIN/1.73SQ M
GLUCOSE SERPL-MCNC: 103 MG/DL (ref 65–140)
HCT VFR BLD AUTO: 41 % (ref 34.8–46.1)
HGB BLD-MCNC: 13.5 G/DL (ref 11.5–15.4)
INR PPP: 1.17 (ref 0.84–1.19)
MCH RBC QN AUTO: 29.7 PG (ref 26.8–34.3)
MCHC RBC AUTO-ENTMCNC: 32.9 G/DL (ref 31.4–37.4)
MCV RBC AUTO: 90 FL (ref 82–98)
PLATELET # BLD AUTO: 207 THOUSANDS/UL (ref 149–390)
PMV BLD AUTO: 9.2 FL (ref 8.9–12.7)
POTASSIUM SERPL-SCNC: 3.9 MMOL/L (ref 3.5–5.3)
PROTHROMBIN TIME: 14.9 SECONDS (ref 11.6–14.5)
RBC # BLD AUTO: 4.55 MILLION/UL (ref 3.81–5.12)
RH BLD: NEGATIVE
SODIUM SERPL-SCNC: 140 MMOL/L (ref 135–147)
SPECIMEN EXPIRATION DATE: NORMAL
WBC # BLD AUTO: 7.38 THOUSAND/UL (ref 4.31–10.16)

## 2023-01-23 RX ORDER — PHENAZOPYRIDINE HYDROCHLORIDE 200 MG/1
200 TABLET, FILM COATED ORAL
Qty: 90 TABLET | Refills: 0 | Status: SHIPPED | OUTPATIENT
Start: 2023-01-23 | End: 2023-01-26

## 2023-01-24 ENCOUNTER — TELEPHONE (OUTPATIENT)
Dept: UROLOGY | Facility: CLINIC | Age: 63
End: 2023-01-24

## 2023-01-24 LAB — BACTERIA UR CULT: NORMAL

## 2023-01-24 NOTE — TELEPHONE ENCOUNTER
----- Message from Je Grant sent at 1/24/2023 12:35 PM EST -----  Regarding: Still have the UTI symptoms  Contact: [phone]  Hi Dr Campbell Puente so I saw you on the 28th of December and you gave me the level foxen to take for 7 days and I took that and I still have the UTI symptoms the burning the abdominal pain and cramping and the stream the urine stream is not consistent so it's not completely gone so yesterday I took over a urine sample just to let you know to see what it shows there okay thank you  Ollie Sterling  PS back in the late 1980s early 1990s I was doing the infertility treatments starting with the clomid then I believe it's called the gyneotropin injections which sounds silly but the product came from urine from a menopausal nun and I went all the way to in vitro and I was just wondering if all those medications could cause all this trouble  It was Dr Shakira Rivas and he stated that I could have pregnated a whole football team with the amount he was giving me  So it's just curious, especially about the urine from menopausal nuns   thank you

## 2023-01-26 ENCOUNTER — ANESTHESIA EVENT (OUTPATIENT)
Dept: PERIOP | Facility: HOSPITAL | Age: 63
End: 2023-01-26

## 2023-01-26 RX ORDER — FEXOFENADINE HCL 180 MG/1
180 TABLET ORAL DAILY
COMMUNITY

## 2023-01-26 RX ORDER — FAMOTIDINE 20 MG/1
20 TABLET, FILM COATED ORAL
COMMUNITY

## 2023-01-26 RX ORDER — MULTIVIT WITH MINERALS/LUTEIN
1000 TABLET ORAL DAILY
COMMUNITY

## 2023-01-26 RX ORDER — ONDANSETRON 4 MG/1
4 TABLET, FILM COATED ORAL EVERY 8 HOURS PRN
COMMUNITY
End: 2023-02-10

## 2023-01-26 NOTE — PRE-PROCEDURE INSTRUCTIONS
Pre-Surgery Instructions:   Medication Instructions   • acetaminophen (TYLENOL) 325 mg tablet Uses PRN- OK to take day of surgery   • apixaban (Eliquis) 5 mg Instructions provided by MD Eliquis hold for 3 days  No hold on ASA-pt verbally understood  • Ascorbic Acid (vitamin C) 1000 MG tablet Stop taking 7 days prior to surgery  • aspirin 81 mg chewable tablet Take day of surgery  • azelastine (OPTIVAR) 0 05 % ophthalmic solution Hold day of surgery  • Biotin w/ Vitamins C & E (HAIR/SKIN/NAILS PO) Stop taking 7 days prior to surgery  • butalbital-acetaminophen-caffeine (FIORICET,ESGIC) -40 mg per tablet Hold day of surgery  • cetirizine (ZyrTEC) 10 mg tablet Take night before surgery   • chlordiazepoxide-clidinium (LIBRAX) 5-2 5 mg per capsule Hold day of surgery  • Cholecalciferol 25 MCG (1000 UT) tablet Stop taking 7 days prior to surgery  • diltiazem (DILACOR XR) 120 MG 24 hr capsule Uses PRN- OK to take day of surgery   • Docusate Sodium (COLACE PO) Hold day of surgery  • Evolocumab 140 MG/ML SOAJ Hold day of surgery  • famotidine (PEPCID) 20 mg tablet Hold day of surgery  • fexofenadine (ALLEGRA) 180 MG tablet Take day of surgery  • fluticasone-vilanterol (BREO ELLIPTA) 200-25 MCG/INH inhaler Take day of surgery  • furosemide (LASIX) 40 mg tablet Hold day of surgery  • Pauline, Zingiber officinalis, (PAULINE PO) Stop taking 7 days prior to surgery  • hydrOXYzine HCL (ATARAX) 25 mg tablet Uses PRN- DO NOT take day of surgery   • LORazepam (ATIVAN) 1 mg tablet Uses PRN- OK to take day of surgery   • metoprolol succinate (TOPROL-XL) 100 mg 24 hr tablet Take day of surgery  • montelukast (SINGULAIR) 10 mg tablet Take night before surgery   • MULTIPLE VITAMIN PO Stop taking 7 days prior to surgery  • nystatin (MYCOSTATIN) powder Hold day of surgery  • omeprazole (PriLOSEC) 40 MG capsule Take day of surgery     • ondansetron (Zofran ODT) 4 mg disintegrating tablet Uses PRN- OK to take day of surgery   • ondansetron (ZOFRAN) 4 mg tablet Pt not taking this one  • phenazopyridine (PYRIDIUM) 200 mg tablet Hold day of surgery  • Probiotic Product (PROBIOTIC-10 PO) Stop taking 7 days prior to surgery  • spironolactone (ALDACTONE) 25 mg tablet Hold day of surgery  • traMADol (ULTRAM) 50 mg tablet Uses PRN- OK to take day of surgery   • triamcinolone (KENALOG) 0 1 % ointment Hold day of surgery  • Turmeric 500 MG CAPS Stop taking 7 days prior to surgery  • Zinc 100 MG TABS Stop taking 7 days prior to surgery  Reviewed preoperative medication and showering instructions with patient-Pt verbalized understanding  Instructed pt to stop taking non prescribed vitamins and herbal supplements 7 days before surgery or per Dr Lewis Gift  May take Tylenol if needed and Instructed to take DOS medications with small sip of water  Instructed NPO past midnight prior to surgery ---surgery department will call the day before surgery with arrival time for DOS  Instructed to notify surgeon and family doctor office with any changes in health prior to surgery      Patient (Pt ) verbalized understanding of all instructions

## 2023-01-31 ENCOUNTER — HOSPITAL ENCOUNTER (INPATIENT)
Facility: HOSPITAL | Age: 63
LOS: 3 days | Discharge: HOME | End: 2023-02-03
Attending: SURGERY | Admitting: SURGERY

## 2023-01-31 ENCOUNTER — HOSPITAL ENCOUNTER (OUTPATIENT)
Dept: NON INVASIVE DIAGNOSTICS | Facility: HOSPITAL | Age: 63
Discharge: HOME | End: 2023-01-31

## 2023-01-31 ENCOUNTER — ANESTHESIA (OUTPATIENT)
Dept: PERIOP | Facility: HOSPITAL | Age: 63
End: 2023-01-31

## 2023-01-31 DIAGNOSIS — I65.22 STENOSIS OF LEFT CAROTID ARTERY: ICD-10-CM

## 2023-01-31 DIAGNOSIS — R53.82 CHRONIC FATIGUE: ICD-10-CM

## 2023-01-31 DIAGNOSIS — E53.8 VITAMIN B12 DEFICIENCY: ICD-10-CM

## 2023-01-31 DIAGNOSIS — I65.22 ASYMPTOMATIC STENOSIS OF LEFT CAROTID ARTERY: Primary | ICD-10-CM

## 2023-01-31 DIAGNOSIS — I65.22 CAROTID STENOSIS, LEFT: ICD-10-CM

## 2023-01-31 LAB
ABO GROUP BLD: NORMAL
KCT BLD-ACNC: 226 SEC (ref 89–137)
KCT BLD-ACNC: 252 SEC (ref 89–137)
PLATELET # BLD AUTO: 167 THOUSANDS/UL (ref 149–390)
PMV BLD AUTO: 8.6 FL (ref 8.9–12.7)
RH BLD: NEGATIVE
SPECIMEN SOURCE: ABNORMAL
SPECIMEN SOURCE: ABNORMAL

## 2023-01-31 PROCEDURE — 03CL0ZZ EXTIRPATION OF MATTER FROM LEFT INTERNAL CAROTID ARTERY, OPEN APPROACH: ICD-10-PCS | Performed by: SURGERY

## 2023-01-31 RX ORDER — CEFAZOLIN SODIUM 2 G/50ML
2000 SOLUTION INTRAVENOUS ONCE
Status: COMPLETED | OUTPATIENT
Start: 2023-01-31 | End: 2023-01-31

## 2023-01-31 RX ORDER — SODIUM CHLORIDE 9 MG/ML
125 INJECTION, SOLUTION INTRAVENOUS CONTINUOUS
Status: DISCONTINUED | OUTPATIENT
Start: 2023-01-31 | End: 2023-01-31

## 2023-01-31 RX ORDER — FAMOTIDINE 20 MG/1
20 TABLET, FILM COATED ORAL
Status: DISCONTINUED | OUTPATIENT
Start: 2023-01-31 | End: 2023-02-03 | Stop reason: HOSPADM

## 2023-01-31 RX ORDER — LABETALOL HYDROCHLORIDE 5 MG/ML
10 INJECTION, SOLUTION INTRAVENOUS ONCE
Status: COMPLETED | OUTPATIENT
Start: 2023-01-31 | End: 2023-01-31

## 2023-01-31 RX ORDER — FENTANYL CITRATE/PF 50 MCG/ML
25 SYRINGE (ML) INJECTION
Status: COMPLETED | OUTPATIENT
Start: 2023-01-31 | End: 2023-01-31

## 2023-01-31 RX ORDER — HYDROMORPHONE HCL/PF 1 MG/ML
0.5 SYRINGE (ML) INJECTION EVERY 2 HOUR PRN
Status: DISCONTINUED | OUTPATIENT
Start: 2023-01-31 | End: 2023-02-03 | Stop reason: HOSPADM

## 2023-01-31 RX ORDER — CYANOCOBALAMIN 1000 UG/ML
1000 INJECTION, SOLUTION INTRAMUSCULAR; SUBCUTANEOUS
Status: DISCONTINUED | OUTPATIENT
Start: 2023-01-31 | End: 2023-01-31 | Stop reason: SDUPTHER

## 2023-01-31 RX ORDER — DILTIAZEM HYDROCHLORIDE 120 MG/1
120 CAPSULE, EXTENDED RELEASE ORAL EVERY 12 HOURS SCHEDULED
Status: DISCONTINUED | OUTPATIENT
Start: 2023-01-31 | End: 2023-02-03 | Stop reason: HOSPADM

## 2023-01-31 RX ORDER — HYDROXYZINE HYDROCHLORIDE 25 MG/1
50 TABLET, FILM COATED ORAL
Status: DISCONTINUED | OUTPATIENT
Start: 2023-01-31 | End: 2023-02-03 | Stop reason: HOSPADM

## 2023-01-31 RX ORDER — ROCURONIUM BROMIDE 10 MG/ML
INJECTION, SOLUTION INTRAVENOUS AS NEEDED
Status: DISCONTINUED | OUTPATIENT
Start: 2023-01-31 | End: 2023-01-31

## 2023-01-31 RX ORDER — ONDANSETRON 4 MG/1
4 TABLET, ORALLY DISINTEGRATING ORAL EVERY 6 HOURS PRN
Status: DISCONTINUED | OUTPATIENT
Start: 2023-01-31 | End: 2023-02-03 | Stop reason: HOSPADM

## 2023-01-31 RX ORDER — METOPROLOL SUCCINATE 50 MG/1
100 TABLET, EXTENDED RELEASE ORAL 2 TIMES DAILY
Status: DISCONTINUED | OUTPATIENT
Start: 2023-01-31 | End: 2023-02-03 | Stop reason: HOSPADM

## 2023-01-31 RX ORDER — OXYCODONE HYDROCHLORIDE 10 MG/1
10 TABLET ORAL EVERY 4 HOURS PRN
Status: DISCONTINUED | OUTPATIENT
Start: 2023-01-31 | End: 2023-02-03 | Stop reason: HOSPADM

## 2023-01-31 RX ORDER — ASPIRIN 81 MG/1
81 TABLET, CHEWABLE ORAL DAILY
Status: DISCONTINUED | OUTPATIENT
Start: 2023-01-31 | End: 2023-02-03 | Stop reason: HOSPADM

## 2023-01-31 RX ORDER — LORAZEPAM 2 MG/ML
0.5 INJECTION INTRAMUSCULAR ONCE
Status: COMPLETED | OUTPATIENT
Start: 2023-01-31 | End: 2023-01-31

## 2023-01-31 RX ORDER — LIDOCAINE HYDROCHLORIDE 10 MG/ML
INJECTION, SOLUTION EPIDURAL; INFILTRATION; INTRACAUDAL; PERINEURAL AS NEEDED
Status: DISCONTINUED | OUTPATIENT
Start: 2023-01-31 | End: 2023-01-31

## 2023-01-31 RX ORDER — HEPARIN SODIUM 1000 [USP'U]/ML
INJECTION, SOLUTION INTRAVENOUS; SUBCUTANEOUS AS NEEDED
Status: DISCONTINUED | OUTPATIENT
Start: 2023-01-31 | End: 2023-01-31

## 2023-01-31 RX ORDER — ACETAMINOPHEN 325 MG/1
975 TABLET ORAL EVERY 6 HOURS SCHEDULED
Status: DISCONTINUED | OUTPATIENT
Start: 2023-01-31 | End: 2023-02-03 | Stop reason: HOSPADM

## 2023-01-31 RX ORDER — EPHEDRINE SULFATE 50 MG/ML
INJECTION INTRAVENOUS AS NEEDED
Status: DISCONTINUED | OUTPATIENT
Start: 2023-01-31 | End: 2023-01-31

## 2023-01-31 RX ORDER — PROPOFOL 10 MG/ML
INJECTION, EMULSION INTRAVENOUS AS NEEDED
Status: DISCONTINUED | OUTPATIENT
Start: 2023-01-31 | End: 2023-01-31

## 2023-01-31 RX ORDER — OXYCODONE HYDROCHLORIDE 5 MG/1
5 TABLET ORAL EVERY 4 HOURS PRN
Status: DISCONTINUED | OUTPATIENT
Start: 2023-01-31 | End: 2023-02-03 | Stop reason: HOSPADM

## 2023-01-31 RX ORDER — DILTIAZEM HYDROCHLORIDE 120 MG/1
120 CAPSULE, EXTENDED RELEASE ORAL EVERY 12 HOURS SCHEDULED
Status: DISCONTINUED | OUTPATIENT
Start: 2023-01-31 | End: 2023-01-31

## 2023-01-31 RX ORDER — ONDANSETRON 2 MG/ML
4 INJECTION INTRAMUSCULAR; INTRAVENOUS ONCE AS NEEDED
Status: COMPLETED | OUTPATIENT
Start: 2023-01-31 | End: 2023-01-31

## 2023-01-31 RX ORDER — BUPIVACAINE HYDROCHLORIDE 5 MG/ML
INJECTION, SOLUTION EPIDURAL; INTRACAUDAL AS NEEDED
Status: DISCONTINUED | OUTPATIENT
Start: 2023-01-31 | End: 2023-01-31 | Stop reason: HOSPADM

## 2023-01-31 RX ORDER — SPIRONOLACTONE 25 MG/1
25 TABLET ORAL 2 TIMES DAILY
Status: DISCONTINUED | OUTPATIENT
Start: 2023-01-31 | End: 2023-01-31

## 2023-01-31 RX ORDER — MIDAZOLAM HYDROCHLORIDE 2 MG/2ML
INJECTION, SOLUTION INTRAMUSCULAR; INTRAVENOUS AS NEEDED
Status: DISCONTINUED | OUTPATIENT
Start: 2023-01-31 | End: 2023-01-31

## 2023-01-31 RX ORDER — SODIUM CHLORIDE 9 MG/ML
INJECTION, SOLUTION INTRAVENOUS CONTINUOUS PRN
Status: DISCONTINUED | OUTPATIENT
Start: 2023-01-31 | End: 2023-01-31

## 2023-01-31 RX ORDER — PROTAMINE SULFATE 10 MG/ML
INJECTION, SOLUTION INTRAVENOUS AS NEEDED
Status: DISCONTINUED | OUTPATIENT
Start: 2023-01-31 | End: 2023-01-31

## 2023-01-31 RX ORDER — ASCORBIC ACID 500 MG
1000 TABLET ORAL DAILY
Status: DISCONTINUED | OUTPATIENT
Start: 2023-01-31 | End: 2023-02-03 | Stop reason: HOSPADM

## 2023-01-31 RX ORDER — SPIRONOLACTONE 25 MG/1
25 TABLET ORAL 2 TIMES DAILY
Status: DISCONTINUED | OUTPATIENT
Start: 2023-01-31 | End: 2023-02-03 | Stop reason: HOSPADM

## 2023-01-31 RX ORDER — CYANOCOBALAMIN 1000 UG/ML
1000 INJECTION, SOLUTION INTRAMUSCULAR; SUBCUTANEOUS
Status: DISCONTINUED | OUTPATIENT
Start: 2023-01-31 | End: 2023-01-31

## 2023-01-31 RX ORDER — ONDANSETRON 2 MG/ML
INJECTION INTRAMUSCULAR; INTRAVENOUS AS NEEDED
Status: DISCONTINUED | OUTPATIENT
Start: 2023-01-31 | End: 2023-01-31

## 2023-01-31 RX ORDER — FLUTICASONE FUROATE AND VILANTEROL 200; 25 UG/1; UG/1
1 POWDER RESPIRATORY (INHALATION) DAILY
Status: DISCONTINUED | OUTPATIENT
Start: 2023-02-01 | End: 2023-02-03 | Stop reason: HOSPADM

## 2023-01-31 RX ORDER — PHENAZOPYRIDINE HYDROCHLORIDE 100 MG/1
200 TABLET, FILM COATED ORAL 3 TIMES DAILY PRN
Status: DISCONTINUED | OUTPATIENT
Start: 2023-01-31 | End: 2023-02-03 | Stop reason: HOSPADM

## 2023-01-31 RX ORDER — LORAZEPAM 1 MG/1
1 TABLET ORAL EVERY 8 HOURS PRN
Status: DISCONTINUED | OUTPATIENT
Start: 2023-01-31 | End: 2023-02-02

## 2023-01-31 RX ORDER — FENTANYL CITRATE 50 UG/ML
INJECTION, SOLUTION INTRAMUSCULAR; INTRAVENOUS AS NEEDED
Status: DISCONTINUED | OUTPATIENT
Start: 2023-01-31 | End: 2023-01-31

## 2023-01-31 RX ORDER — AMOXICILLIN 250 MG
1 CAPSULE ORAL
Status: DISCONTINUED | OUTPATIENT
Start: 2023-01-31 | End: 2023-02-03 | Stop reason: HOSPADM

## 2023-01-31 RX ORDER — FUROSEMIDE 40 MG/1
40 TABLET ORAL DAILY
Status: DISCONTINUED | OUTPATIENT
Start: 2023-01-31 | End: 2023-02-03 | Stop reason: HOSPADM

## 2023-01-31 RX ORDER — CHLORHEXIDINE GLUCONATE 0.12 MG/ML
15 RINSE ORAL ONCE
Status: COMPLETED | OUTPATIENT
Start: 2023-01-31 | End: 2023-01-31

## 2023-01-31 RX ORDER — HEPARIN SODIUM 5000 [USP'U]/ML
5000 INJECTION, SOLUTION INTRAVENOUS; SUBCUTANEOUS EVERY 8 HOURS SCHEDULED
Status: DISCONTINUED | OUTPATIENT
Start: 2023-01-31 | End: 2023-02-01

## 2023-01-31 RX ADMIN — LIDOCAINE HYDROCHLORIDE 100 MG: 10 INJECTION, SOLUTION EPIDURAL; INFILTRATION; INTRACAUDAL; PERINEURAL at 09:48

## 2023-01-31 RX ADMIN — ACETAMINOPHEN 325MG 975 MG: 325 TABLET ORAL at 23:38

## 2023-01-31 RX ADMIN — SODIUM CHLORIDE 125 ML/HR: 0.9 INJECTION, SOLUTION INTRAVENOUS at 08:47

## 2023-01-31 RX ADMIN — LORAZEPAM 1 MG: 1 TABLET ORAL at 19:50

## 2023-01-31 RX ADMIN — CHLORHEXIDINE GLUCONATE 0.12% ORAL RINSE 15 ML: 1.2 LIQUID ORAL at 08:53

## 2023-01-31 RX ADMIN — FENTANYL CITRATE 25 MCG: 50 INJECTION INTRAMUSCULAR; INTRAVENOUS at 13:16

## 2023-01-31 RX ADMIN — LABETALOL HYDROCHLORIDE 10 MG: 5 INJECTION, SOLUTION INTRAVENOUS at 12:31

## 2023-01-31 RX ADMIN — HYDROXYZINE HYDROCHLORIDE 50 MG: 25 TABLET ORAL at 22:35

## 2023-01-31 RX ADMIN — MIDAZOLAM 2 MG: 1 INJECTION INTRAMUSCULAR; INTRAVENOUS at 09:41

## 2023-01-31 RX ADMIN — FENTANYL CITRATE 50 MCG: 50 INJECTION INTRAMUSCULAR; INTRAVENOUS at 11:43

## 2023-01-31 RX ADMIN — FENTANYL CITRATE 25 MCG: 50 INJECTION INTRAMUSCULAR; INTRAVENOUS at 12:58

## 2023-01-31 RX ADMIN — ASPIRIN 81 MG: 81 TABLET, CHEWABLE ORAL at 15:56

## 2023-01-31 RX ADMIN — ONDANSETRON HYDROCHLORIDE 4 MG: 2 SOLUTION INTRAMUSCULAR; INTRAVENOUS at 13:31

## 2023-01-31 RX ADMIN — FENTANYL CITRATE 25 MCG: 50 INJECTION INTRAMUSCULAR; INTRAVENOUS at 13:57

## 2023-01-31 RX ADMIN — EPHEDRINE SULFATE 7.5 MG: 50 INJECTION, SOLUTION INTRAVENOUS at 10:42

## 2023-01-31 RX ADMIN — SUGAMMADEX 400 MG: 100 INJECTION, SOLUTION INTRAVENOUS at 11:48

## 2023-01-31 RX ADMIN — FAMOTIDINE 20 MG: 20 TABLET, FILM COATED ORAL at 22:35

## 2023-01-31 RX ADMIN — FENTANYL CITRATE 50 MCG: 50 INJECTION INTRAMUSCULAR; INTRAVENOUS at 09:48

## 2023-01-31 RX ADMIN — FENTANYL CITRATE 25 MCG: 50 INJECTION INTRAMUSCULAR; INTRAVENOUS at 13:50

## 2023-01-31 RX ADMIN — HYDROMORPHONE HYDROCHLORIDE 0.5 MG: 1 INJECTION, SOLUTION INTRAMUSCULAR; INTRAVENOUS; SUBCUTANEOUS at 16:26

## 2023-01-31 RX ADMIN — SODIUM CHLORIDE 125 ML/HR: 0.9 INJECTION, SOLUTION INTRAVENOUS at 12:59

## 2023-01-31 RX ADMIN — SODIUM CHLORIDE 0.1 MCG/KG/MIN: 9 INJECTION, SOLUTION INTRAVENOUS at 09:58

## 2023-01-31 RX ADMIN — ROCURONIUM BROMIDE 50 MG: 10 INJECTION, SOLUTION INTRAVENOUS at 09:48

## 2023-01-31 RX ADMIN — FENTANYL CITRATE 25 MCG: 50 INJECTION INTRAMUSCULAR; INTRAVENOUS at 12:44

## 2023-01-31 RX ADMIN — HEPARIN SODIUM 5000 UNITS: 5000 INJECTION INTRAVENOUS; SUBCUTANEOUS at 15:57

## 2023-01-31 RX ADMIN — METOPROLOL SUCCINATE 100 MG: 50 TABLET, EXTENDED RELEASE ORAL at 17:16

## 2023-01-31 RX ADMIN — PROTAMINE SULFATE 40 MG: 10 INJECTION, SOLUTION INTRAVENOUS at 11:35

## 2023-01-31 RX ADMIN — SPIRONOLACTONE 25 MG: 25 TABLET ORAL at 17:16

## 2023-01-31 RX ADMIN — FENTANYL CITRATE 50 MCG: 50 INJECTION INTRAMUSCULAR; INTRAVENOUS at 10:31

## 2023-01-31 RX ADMIN — FENTANYL CITRATE 25 MCG: 50 INJECTION INTRAMUSCULAR; INTRAVENOUS at 12:53

## 2023-01-31 RX ADMIN — ACETAMINOPHEN 325MG 975 MG: 325 TABLET ORAL at 17:15

## 2023-01-31 RX ADMIN — OXYCODONE 5 MG: 5 TABLET ORAL at 22:34

## 2023-01-31 RX ADMIN — SODIUM CHLORIDE: 0.9 INJECTION, SOLUTION INTRAVENOUS at 11:15

## 2023-01-31 RX ADMIN — FUROSEMIDE 40 MG: 40 TABLET ORAL at 15:49

## 2023-01-31 RX ADMIN — HYDROMORPHONE HYDROCHLORIDE 0.5 MG: 1 INJECTION, SOLUTION INTRAMUSCULAR; INTRAVENOUS; SUBCUTANEOUS at 20:25

## 2023-01-31 RX ADMIN — LORAZEPAM 0.5 MG: 2 INJECTION INTRAMUSCULAR; INTRAVENOUS at 13:47

## 2023-01-31 RX ADMIN — OXYCODONE HYDROCHLORIDE 10 MG: 10 TABLET ORAL at 23:38

## 2023-01-31 RX ADMIN — HEPARIN SODIUM 8000 UNITS: 1000 INJECTION INTRAVENOUS; SUBCUTANEOUS at 10:58

## 2023-01-31 RX ADMIN — DILTIAZEM HYDROCHLORIDE 120 MG: 120 CAPSULE, EXTENDED RELEASE ORAL at 17:26

## 2023-01-31 RX ADMIN — EPHEDRINE SULFATE 7.5 MG: 50 INJECTION, SOLUTION INTRAVENOUS at 11:13

## 2023-01-31 RX ADMIN — FENTANYL CITRATE 25 MCG: 50 INJECTION INTRAMUSCULAR; INTRAVENOUS at 14:04

## 2023-01-31 RX ADMIN — LORAZEPAM 0.5 MG: 2 INJECTION INTRAMUSCULAR; INTRAVENOUS at 13:18

## 2023-01-31 RX ADMIN — PHENAZOPYRIDINE 200 MG: 200 TABLET ORAL at 19:50

## 2023-01-31 RX ADMIN — SODIUM CHLORIDE: 0.9 INJECTION, SOLUTION INTRAVENOUS at 09:58

## 2023-01-31 RX ADMIN — PROPOFOL 150 MG: 10 INJECTION, EMULSION INTRAVENOUS at 09:48

## 2023-01-31 RX ADMIN — ONDANSETRON 4 MG: 2 INJECTION INTRAMUSCULAR; INTRAVENOUS at 09:48

## 2023-01-31 RX ADMIN — OXYCODONE 5 MG: 5 TABLET ORAL at 15:48

## 2023-01-31 RX ADMIN — TRIMETHOBENZAMIDE HYDROCHLORIDE 200 MG: 100 INJECTION INTRAMUSCULAR at 14:53

## 2023-01-31 RX ADMIN — FENTANYL CITRATE 50 MCG: 50 INJECTION INTRAMUSCULAR; INTRAVENOUS at 12:08

## 2023-01-31 RX ADMIN — FENTANYL CITRATE 25 MCG: 50 INJECTION INTRAMUSCULAR; INTRAVENOUS at 12:29

## 2023-01-31 RX ADMIN — CEFAZOLIN SODIUM 2000 MG: 2 SOLUTION INTRAVENOUS at 09:37

## 2023-01-31 RX ADMIN — ONDANSETRON 4 MG: 4 TABLET, ORALLY DISINTEGRATING ORAL at 23:38

## 2023-01-31 RX ADMIN — EPHEDRINE SULFATE 5 MG: 50 INJECTION, SOLUTION INTRAVENOUS at 11:03

## 2023-01-31 RX ADMIN — ROCURONIUM BROMIDE 20 MG: 10 INJECTION, SOLUTION INTRAVENOUS at 11:16

## 2023-01-31 RX ADMIN — DOCUSATE SODIUM AND SENNOSIDES 1 TABLET: 8.6; 5 TABLET, FILM COATED ORAL at 22:35

## 2023-01-31 RX ADMIN — SODIUM CHLORIDE 125 ML/HR: 0.9 INJECTION, SOLUTION INTRAVENOUS at 17:28

## 2023-01-31 RX ADMIN — HEPARIN SODIUM 5000 UNITS: 5000 INJECTION INTRAVENOUS; SUBCUTANEOUS at 22:35

## 2023-01-31 RX ADMIN — OXYCODONE HYDROCHLORIDE AND ACETAMINOPHEN 1000 MG: 500 TABLET ORAL at 15:56

## 2023-01-31 NOTE — H&P
H & P    Plan: Left CEA  Operative site marked  Procedure, risks, benefits, alternatives and anticipated postoperative course discussed again with patient  /64   Pulse 56   Temp (!) 96 °F (35 6 °C) (Temporal)   Resp 20   Ht 5' 4" (1 626 m)   Wt 88 5 kg (195 lb 1 7 oz)   LMP  (LMP Unknown)   SpO2 94%   BMI 33 49 kg/m²         Assessment/Plan:     Stenosis of left carotid artery  Hx of high grade left carotid artery stenosis  Had an in-depth discussion with patient today in the office that her left-sided facial symptoms are not related to her carotid artery stenosis but nevertheless the degree/severity of stenosis would warrant recommendation for left carotid intervention  We did review the options of carotid stent versus endarterectomy versus continue medical management  I believe in her case a carotid endarterectomy would be most appropriate  The procedure, risk, benefits, alternatives, and anticipated postop course were discussed in detail  Patient did sign consent however has asked that surgery be deferred until she calls the office and confirms she wishes to proceed  At this time she reports that with her "interstitial cystitis and her SMA issues there is too much going on and I would not do well in the hospital"           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HCC)  Mesenteric duplex demonstrates a widely patent SMA stent  Of note she continues to complain of bruising  She comes to the office with several photos  We discussed that her bruising is likely related to her anticoagulation          Diagnoses and all orders for this visit:     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HCC)     Stenosis of left carotid artery            Subjective:       Patient ID: Estrella Vaca is a 57 y o  female      Patient is here today to review results of Celiac/Messenteric duplex  Patient c/o pain in the left upper quadrant of her abdomen and flank pain  She is s/p a SMA stent done 8/1/2021 by Dr Renetta Gilliam at [facility]   She also c/o wt loss  Patient is taking Eliquis and ASA 81 mg  Patient is a non-smoker    Barb Phillips returns to the office to review mesenteric as well as carotid duplex  She continues with a myriad of symptoms/complaints including left facial numbness/weakness as well as "heartbeat" in left ear, nausea and bruising         The following portions of the patient's history were reviewed and updated as appropriate: allergies, current medications, past family history, past medical history, past social history, past surgical history and problem list      Review of Systems   Constitutional: Negative  HENT: Negative  Eyes: Negative  Respiratory: Positive for shortness of breath (SOB w/activity)  Cardiovascular: Negative  Gastrointestinal: Positive for abdominal pain  Endocrine: Negative  Genitourinary: Positive for difficulty urinating  Musculoskeletal: Positive for back pain  Skin: Negative  Allergic/Immunologic: Positive for environmental allergies  Neurological: Positive for dizziness, weakness and headaches  Hematological: Bruises/bleeds easily  Psychiatric/Behavioral: Negative  I have personally reviewed the ROS entered by MA and agree as documented      Objective:        /92 (BP Location: Left arm, Patient Position: Sitting, Cuff Size: Large)   Pulse 68   Ht 5' 4" (1 626 m)   Wt 88 2 kg (194 lb 6 4 oz)   LMP  (LMP Unknown)   SpO2 94%   BMI 33 37 kg/m²             Physical Exam  Constitutional:       General: She is not in acute distress  Appearance: She is well-developed  She is obese  She is not ill-appearing, toxic-appearing or diaphoretic  HENT:      Head: Normocephalic and atraumatic  Eyes:      General: No scleral icterus  Conjunctiva/sclera: Conjunctivae normal    Neck:      Trachea: No tracheal deviation  Cardiovascular:      Rate and Rhythm: Normal rate  Rhythm irregular  Heart sounds: Normal heart sounds     Pulmonary:      Effort: Pulmonary effort is normal       Breath sounds: Normal breath sounds  Abdominal:      General: There is no distension  Palpations: Abdomen is soft  There is no mass (no appreciable aortic pulsation/aneurysm)  Tenderness: There is no abdominal tenderness  There is no guarding or rebound  Musculoskeletal:         General: Normal range of motion  Cervical back: Normal range of motion and neck supple  Skin:     General: Skin is warm and dry  Neurological:      Mental Status: She is alert and oriented to person, place, and time  Psychiatric:         Mood and Affect: Mood normal          Behavior: Behavior normal          Thought Content: Thought content normal          Judgment: Judgment normal              Carotid duplex:  LEFT:  There is >70% stenosis noted in the internal carotid artery  Plaque is  Heterogenous      Segment      Rig                     Left                        PSV  EDV (cm/s)  Ratio  Impression  PSV  EDV (cm/s)  Ratio    Mid  ICA     070          13   1 76              456          31   2 87    Prox   ICA     77          93   0 73  70 - 99%    071         602   4 81    Mid CCA       18          49   0 77               42          68   1 07    Prox CCA      47          16                      69          53           Ext Carotid   80           5   1 22              201          30   2 99    Prox Isabelat Jessica Sweeney          15                      24          14           Subclavian Septimius Pimple           1                     135           4

## 2023-01-31 NOTE — PROGRESS NOTES
Progress Note:    Preop dx: asymptomatic high grade left carotid stenosis  S/p left carotid exposure; aborted carotid endarterectomy  Patient seen and examined in PACU  Discussed with Robert Julio that unable to completed planned left CEA due to inability to achieve adequate distal ICA exposure safely  Of note during dissection patient did require both manipulation of hyoglossal as well placement of self retaining retractor during distal exposure  In PACU patient was noted to have slight facial droop likely result of retraction on marginal mandibular branch of facial nerve  No other focal neuro deficits  Left neck incision well approximated, soft, no hematoma  Plan to admit to stepdown 1 for post op monitoring

## 2023-01-31 NOTE — ASSESSMENT & PLAN NOTE
Pt with BP in urgent range, received labetalol 10 mg IV Once at 12:30  Upon arrival in the ICU, /84, improved somewhat after administration of Dilaudid for breakthrough pain to SBP 170s  Pt reports taking home metoprolol on morning of admission  Did not take home spironolactone or diltiazem on morning of admission       Pt has severe allergies to ACE-I, ARB, Hydralazine    Give home spironolactone and diltiazem now, then continue metop, melida, dilt

## 2023-01-31 NOTE — ADDENDUM NOTE
Addendum  created 01/31/23 1449 by Tyler Seo MD    Order list changed, Pharmacy for encounter modified

## 2023-01-31 NOTE — ASSESSMENT & PLAN NOTE
Pt anxious and tearful on exam related to health concerns      Continue home meds hydroxyzine QHS and Ativan Q8H prn

## 2023-01-31 NOTE — CASE MANAGEMENT
Case Management Discharge Planning Note    Patient name Je Grant  Location ICU 06/ICU 06 MRN [id number]  : 1960 Date 2023       Current Admission Date: 2023  Current Admission Diagnosis:Stenosis of left carotid artery   Patient Active Problem List    Diagnosis Date Noted   • Lower abdominal pain 2022   • Hepatic steatosis 2022   • Acute URI 2022   • Premature atrial contractions 2022   • Asymptomatic stenosis of left carotid artery 10/05/2022   • Stenosis of left carotid artery 2022   • Carotid artery stenosis 2022   • Stroke-like symptoms 2022   • Localized swelling of left lower extremity 2022   • Acute CVA (cerebrovascular accident) ([facility] [address] ) 2022   • Recurrent UTI 2022   • Pain of upper abdomen 2022   • Left upper quadrant abdominal pain 2022   • S/P appendectomy 2022   • Appendix disease 2022   • Urinary retention 2022   • Peripheral vascular disease ([facility] [address] ) 2022   • Hematoma 11/15/2021   • Continuous opioid dependence ([facility] [address] ) 11/15/2021   • 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[facility] [address] ) 2021   • Hypoxia 2021   • Chronic bronchitis, unspecified chronic bronchitis type ([facility] [address] ) 2021   • Left arm pain 2021   • COPD (chronic obstructive pulmonary disease) ([facility] [address] ) 2021   • Diverticulitis 2021   • Left upper quadrant pain 2021   • Moderate persistent asthma without complication    • Vitamin B12 deficiency 2021   • Unspecified diastolic (congestive) heart failure ([address] ) 2021   • Shortness of breath 2021   • Hypertensive heart disease with congestive heart failure ([facility] [address] ) 2020   • CAM[place]LE (obstructive sleep apnea)    • Acute pyelonephritis 2020   • Lumbar radiculopathy 10/01/2020   • Chronic fatigue 10/01/2020   • Wheezing 2020   • Injury of toe on right foot 2020   • Patellar tendinitis of left knee 2020   • Hair loss 2020 • [place] (dyspnea on exertion) 06/11/2020   • Vaginal candidiasis 04/09/2020   • Paroxysmal atrial fibrillation (HCC) 03/19/2020   • Low TSH level 02/06/2020   • Other chest pain 01/27/2020   • Vasomotor rhinitis 01/25/2020   • Allergic conjunctivitis of both eyes 01/22/2020   • Intrinsic atopic dermatitis 01/22/2020   • Angio-edema 01/22/2020   • Gastroesophageal reflux disease without esophagitis 08/12/2019   • Tinea corporis 07/15/2019   • Acute gastric ulcer without hemorrhage or perforation 06/12/2019   • Palpitations and chest pain   04/02/2019   • Intertrigo 04/02/2019   • Allergic reaction 12/03/2018   • Insomnia 11/16/2018   • Snoring 11/16/2018   • Cervicalgia 11/16/2018   • Headache 11/16/2018   • AMD (age-related macular degeneration), bilateral 11/16/2018   • Anxiety 10/19/2018   • Angina pectoris ([address] ) 11/15/2017   • Female pelvic pain 09/25/2017   • Arthritis 08/01/2017   • Venous insufficiency 08/01/2017   • Tick bite 07/14/2017   • Acute upper respiratory infection 02/24/2017   • Attention disturbance 01/26/2017   • Familial hyperlipidemia 12/02/2016   • Allergic rhinitis 10/14/2016   • Anxiety and depression 10/14/2016   • Asthma due to seasonal allergies 10/14/2016   • Benign essential hypertension 10/14/2016   • Interstitial cystitis 10/14/2016   • Chronic low back pain 10/14/2016   • Elevated antinuclear antibody (SON) level 10/14/2016   • Elevated blood sugar 10/14/2016   • Hyperlipidemia 10/14/2016   • Migraines 10/14/2016   • Essential hypertension 08/13/2015   • Lipid disorder 08/13/2015   • Tobacco abuse 08/13/2015   • Obesity (BMI 30-39 9) 03/24/2011      LOS (days): 0  Geometric Mean LOS (GMLOS) (days):   Days to GMLOS:     OBJECTIVE:  Risk of Unplanned Readmission Score: 23 96         Current admission status: Inpatient   Preferred Pharmacy:   [address], [address] [facility] [address]  Phone: [phone] Fax: [address], [address] [facility] [address]  [address] [facility] [address]  Phone: [phone] Fax: [phone]    [facility] [address] [place] [facility] [address], [place] [address] [facility] [address]  Phone: [phone] Fax: [address], [place] - [facility] [address] ,  [address] ,  [facility] [address]  Phone: [phone] Fax: [phone]    Primary Care Provider: Jeramy Villa DO    Primary Insurance: [facility]  Secondary Insurance:     DISCHARGE DETAILS:    Discharge planning discussed with[de-identified] Patient and friend Tamie Cope of Choice: Yes     CM contacted family/caregiver?: Yes (friend Aakash Davis at bedside)             Contacts  Patient Contacts: Bessie Jarvis  Relationship to Patient[de-identified] Friend  Contact Method: In Person  Reason/Outcome: Emergency Contact, Discharge [address]         Is the patient interested in [facility] at discharge?: Yes  [address] requested[de-identified] Nursing (PT/OT eval pending)  [address] [facility] Name[de-identified] Other  [address] [facility] Provider[de-identified] PCP  Home Health Services Needed[de-identified] Post-Op Care and Assessment  Homebound Criteria Met[de-identified] Requires the Assistance of Another Person for Safe Ambulation or to Leave the Home (homebound per provider)  Supporting Clincal Findings[de-identified] Limited Endurance    DME Referral Provided  Referral made for DME?: No    Other Referral/Resources/Interventions Provided:  Interventions: [facility]  Referral Comments: [place] referrals placed for [facility]                                                      Additional Comments: CM met with pt and friend Aakash Davis at bedside  Pt requesting nursing in the home  PT/OT evaluations pending  CM placed blanket referrals for [facility]  Pt reported the doctor told her she was unable to drive for a few weeks  Pt lives alone and is independnet with ambulation at baseline   Pt reported she was going to OP PT prior to admission  CM dept to follow

## 2023-01-31 NOTE — ANESTHESIA POSTPROCEDURE EVALUATION
Post-Op Assessment Note    CV Status:  Stable    Pain management: adequate     Mental Status:  Alert and awake   Hydration Status:  Euvolemic   PONV Controlled:  Controlled   Airway Patency:  Patent      Post Op Vitals Reviewed: Yes      Staff: Anesthesiologist         No notable events documented    BP (!) 175/77   Pulse 80   Temp (!) 97 1 °F (36 2 °C) (Temporal)   Resp 12   Ht 5' 4" (1 626 m)   Wt 88 5 kg (195 lb 1 7 oz)   LMP  (LMP Unknown)   SpO2 91%   BMI 33 49 kg/m²   BP     Temp     Pulse     Resp      SpO2

## 2023-01-31 NOTE — ANESTHESIA PROCEDURE NOTES
Arterial Line Insertion  Performed by: Naa Adair CRNA  Authorized by: Micah Hough MD   Consent: Written consent obtained  Risks and benefits: risks, benefits and alternatives were discussed  Consent given by: patient  Patient understanding: patient states understanding of the procedure being performed  Patient consent: the patient's understanding of the procedure matches consent given  Procedure consent: procedure consent matches procedure scheduled  Relevant documents: relevant documents present and verified  Test results: test results available and properly labeled  Site marked: the operative site was marked  Time out: Immediately prior to procedure a "time out" was called to verify the correct patient, procedure, equipment, support staff and site/side marked as required  Preparation: Patient was prepped and draped in the usual sterile fashion    Indications: hemodynamic monitoring  Orientation:  Right  Location: radial artery  Procedure Details:  Needle gauge: 20  Number of attempts: 1    Post-procedure:  Post-procedure: dressing applied  Waveform: good waveform  Post-procedure CNS: normal  Patient tolerance: Patient tolerated the procedure well with no immediate complications

## 2023-01-31 NOTE — CONSULTS
[facility] 1960, 57 y o  female MRN: [id number]  Unit/Bed#: ICU 06 Encounter: [id number]  Primary Care Provider: Yonathan Manning DO   Date and time admitted to hospital: 1/31/2023  7:43 AM    Inpatient consult to Royal Rivera performed by: Jennyfer Cam MD  Consult ordered by: Chinyere Godoy DO          * Stenosis of left carotid artery  Assessment & Plan  Vascular duplex carotids 10/7/22: R<50% stenosis, L internal carotid >70% stenosis  Asymptomatic  Pt presented 1/31/23 for elective endarterectomy of LIC asymptomatic stenosis  Pt taken to OR with Vascular surgery, POD 0 s/p aborted endarterectomy  Operation aborted d/t difficult anatomy  Pt admitted to ICU post-operatively with Critical Care consult, Vasc Surg primary  c/o L lower facial droop and numbness, pain in L neck extending up L face into forehead, rated 8/10  History of L facial droop, first noted Sept 2022, attributed to TIA  Pt reports this had improved and is worse in the post-op setting  Surgical incision c/d/i  Mild swelling without induration      -Pain: scheduled Tylenol with Oxycodone prns for moderate and severe pain, dilaudid for breakthrough  Pt is on tramadol at home for chronic pain  -Frequent Neuro checks   -Care per primary    Benign essential hypertension  Assessment & Plan  Pt with BP in urgent range, received labetalol 10 mg IV Once at 12:30  Upon arrival in the ICU, /84, improved somewhat after administration of Dilaudid for breakthrough pain to SBP 170s  Pt reports taking home metoprolol on morning of admission  Did not take home spironolactone or diltiazem on morning of admission       Pt has severe allergies to ACE-I, ARB, Hydralazine    Give home spironolactone and diltiazem now, then continue metop, melida, dilt    Paroxysmal atrial fibrillation (HCC)  Assessment & Plan  Pt currently in NSR    From vasc surg standpoint, safe to resume home Eliquis tomorrow, 1/31/23    Anxiety and depression  Assessment & Plan  Pt anxious and tearful on exam related to health concerns  Continue home meds hydroxyzine QHS and Ativan Q8H prn      VTE Prophylaxis: VTE Score: 4 Moderate Risk (Score 3-4) - Pharmacological DVT Prophylaxis Ordered: heparin  Collaboration of Care: Were Recommendations Directly Discussed with Primary Treatment Team? Yes    History of Present Illness:  Cortez Mays is a 57 y o  female with past medical history inclusive of TIA, carotid artery stenosis, paroxysmal A  Fib, hypertension, hyperlipidemia, anxiety, depression who is originally admitted to the vascular surgery service for elective endarterectomy of the left internal carotid for severe asymptomatic stenosis  Patient originally presented to PCP for left-sided facial droop in September 2022, found to have internal carotid stenosis on MRI, greater than 70% occlusion confirmed on ultrasound  Reports facial droop had improved after initial presentation, diagnosed with TIA, no prior history of stroke  She was taken to the OR 1/31/2023 for elective surgery, however planned endarterectomy was aborted due to difficult anatomy  Critical Care is consulted for medical postoperative care  Review of Systems:  Review of Systems   Constitutional: Negative for chills and fever  HENT: Negative for trouble swallowing and voice change  Mild pain in throat following intubation   Eyes: Negative for visual disturbance  Respiratory: Negative for shortness of breath (denies subjective SOB on 2 L NC, not on home O2)  Cardiovascular: Negative for chest pain  Gastrointestinal: Negative for abdominal pain, constipation, diarrhea, nausea and vomiting  Endocrine: Negative  Genitourinary: Negative  Musculoskeletal: Positive for neck pain (L neck)  Skin: Positive for wound (surgical)  Neurological: Positive for headaches (L-sided)   Negative for dizziness and light-headedness  All other systems reviewed and are negative  Past Medical and Surgical History:   Past Medical History:   Diagnosis Date   • A-fib ([facility] [address] ) 03/2020   • Allergic    • Anxiety    • Arthritis    • Asthma    • Back pain     and muscle pain   • Bruises easily    • Chronic pain disorder     Interstitial pain   • Colon polyp    • Dental bridge present    • Depression    • Eczema    • GERD (gastroesophageal reflux disease)    • Heart murmur    • History of blood clots     per pt "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"   • History of pneumonia    • Hives    • Hyperlipidemia    • Hypertension    • Infertility, female    • Interstitial cystitis    • Migraine     sees neurologist   • Motion sickness    • Palpitations    • PONV (postoperative nausea and vomiting)    • Psychiatric disorder    • TIA (transient ischemic attack) 09/2022    per pt --"had MRI and 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"   • Urinary incontinence     wears Pads   • Wears glasses        Past Surgical History:   Procedure Laterality Date   • COLONOSCOPY     • DILATION AND CURETTAGE OF UTERUS     • EGD     • EXPLORATORY LAPAROTOMY      for infertility   • HAND SURGERY      Hand excision of tendon cyst   • ND LAPAROSCOPIC APPENDECTOMY N/A 05/03/2022    Procedure: APPENDECTOMY LAPAROSCOPIC;  Surgeon: Niya Butler MD;  Location: [facility];  Service: General   • SUPERIOR MESTENTERIC ARTERY STENT     • WISDOM TOOTH EXTRACTION         Meds/Allergies:  all medications and allergies reviewed    Allergies: Allergies   Allergen Reactions   • Ace Inhibitors Angioedema and Anaphylaxis     Anaphylaxis   • Benicar [Olmesartan] Angioedema     See Allergy note from 9/11/2008  Swollen ankles/legs   • Hydralazine Other (See Comments)     Lip swelling  Flank pain   • Other Anaphylaxis and Other (See Comments)     Other reaction(s): angioadema  Other reaction(s):  Other (See Comments)  Preservatives- itching throat closes, hi  Other reaction(s): angioadema  E Z Cat - hives throat closes itching,  Preservatives- itching throat closes, hi  Artificial sweeteners   • Valsartan Angioedema     Lips, face swollen   • Sulfa Antibiotics Hives     stuffiness,itching,hives,throat closing--per pt "sometimes able to take depending on the brand and the filler or possibly preservatives in it"   • Ampicillin Hives     Depends on brand some preservatives can react  Has recently tolerated oral amoxicillin     • Motrin [Ibuprofen] Other (See Comments)     Per pt " told not to take due to A Fib"   • Wound Dressing Adhesive Other (See Comments)     Per pt Adhesive on EKG leads caused redness       Social History:  Marital Status:   Substance Use History:   Social History     Substance and Sexual Activity   Alcohol Use Never     Social History     Tobacco Use   Smoking Status Former   • Packs/day: 0 50   • Years: 10 00   • Pack years: 5 00   • Types: Cigarettes   • Quit date:    • Years since quittin 0   Smokeless Tobacco Never     Social History     Substance and Sexual Activity   Drug Use No       Family History:  Family History   Problem Relation Age of Onset   • Lung cancer Mother 61   • Brain cancer Mother 61   • Diabetes Father    • Depression Father    • Other Father         septic   • Allergies Sister    • Hashimoto's thyroiditis Sister    • Abdominal aortic aneurysm Sister    • Diabetes Sister    • No Known Problems Sister    • No Known Problems Maternal Grandmother    • No Known Problems Maternal Grandfather    • No Known Problems Paternal Grandmother    • No Known Problems Paternal Grandfather    • Hodgkin's lymphoma Brother    • No Known Problems Brother    • No Known Problems Maternal Aunt    • Diabetes Other        Physical Exam:   Vitals:   Blood Pressure: 170/73 (23 151)  Pulse: 78 (23)  Temperature: 97 6 °F (36 4 °C) (23)  Temp Source: Oral (23)  Respirations: 19 (23)  Height: 5' 4" (162 6 cm) (01/31/23 1518)  Weight - Scale: 96 kg (211 lb 10 3 oz) (01/31/23 1518)  SpO2: 92 % (01/31/23 1715)    Physical Exam  Vitals reviewed  Constitutional:       General: She is in acute distress (tearful and anxious related to health concerns)  Appearance: She is obese  She is not ill-appearing, toxic-appearing or diaphoretic  HENT:      Head: Normocephalic and atraumatic  Mouth/Throat:      Mouth: Mucous membranes are moist       Pharynx: Oropharynx is clear  Eyes:      General: No scleral icterus  Right eye: No discharge  Left eye: No discharge  Conjunctiva/sclera: Conjunctivae normal       Pupils: Pupils are equal, round, and reactive to light  Neck:      Comments: Surgical incision L lateral neck, c/d/i, no active bleeding, supple with no induration  Cardiovascular:      Rate and Rhythm: Normal rate and regular rhythm  Pulses: Normal pulses  Heart sounds: Normal heart sounds  No murmur heard  No friction rub  No gallop  Pulmonary:      Effort: Pulmonary effort is normal  No respiratory distress  Breath sounds: Normal breath sounds  No stridor  No wheezing, rhonchi or rales  Chest:      Chest wall: No tenderness  Abdominal:      General: Bowel sounds are normal  There is no distension  Palpations: Abdomen is soft  Tenderness: There is no abdominal tenderness  There is no guarding or rebound  Musculoskeletal:         General: No tenderness  Cervical back: Neck supple  No rigidity  Right lower leg: No edema  Left lower leg: No edema  Lymphadenopathy:      Cervical: No cervical adenopathy  Skin:     General: Skin is warm and dry  Findings: Bruising present  Neurological:      Mental Status: She is alert and oriented to person, place, and time  Cranial Nerves: Cranial nerve deficit (L lower facial droop, decreased strength in L muscles of mastication) present  Motor: No weakness     Psychiatric: Comments: Anxious and tearful        Additional Data:   Lab Results:    Results from last 7 days   Lab Units 01/31/23  1256   PLATELETS Thousands/uL 167                 Lab Results   Component Value Date/Time    HGBA1C 5 6 09/02/2022 05:01 AM    HGBA1C 5 8 01/24/2020 12:00 AM    HGBA1C 5 4 10/19/2018 05:21 AM    HGBA1C 4 9 11/13/2017 12:45 PM    HGBA1C 5 4 11/05/2016 09:30 AM               Imaging: No new imaging for review this admission  Reviewed vascular US from 10/7/23  No orders to display       EKG, Pathology, and Other Studies Reviewed on Admission:   · EKG: No EKG obtained  NSR HR 70s on tele  ** Please Note: This note may have been constructed using a voice recognition system  **

## 2023-01-31 NOTE — OP NOTE
OPERATIVE REPORT  PATIENT NAME: Prem Bolanos    :  1960  MRN: [id number]  Pt Location: [place] [address]    SURGERY DATE: 2023    Surgeon(s) and Role:     * Roberto García, DO - Primary     * AVTAR NashC - [address] Nicolle Peterson, DO - Fellow    Preop Diagnosis:  Stenosis of left carotid artery [I65 22]    Post-Op Diagnosis Codes:     * Stenosis of left carotid artery [I65 22]    Procedure(s):  Left - EXPLORATION OF LEFT CAROTID ARTERY; ABORTED ENDARTERECTOMY ARTERY CAROTID    Specimen(s):  * No specimens in log *    Estimated Blood Loss:   Minimal    Drains:  [REMOVED] Urethral Catheter Non-latex 16 Fr  (Removed)   Number of days: 0       Anesthesia Type:   General    Operative Indications:  Stenosis of left carotid artery [I65 22]    58 y/o fem with PMH pAfib, CAD, PPM, CAMILLE, SMA Thrombosis c BES, obesity, HTN, Lap Appy presenting with Left Asymptomatic Carotid Stenosis  Discussed risk and benefits of intervention  Operative Findings:    Left Carotid bifurcation high and deep in neck  Left hypoglossal nerve overriding the bifurcation  Left ICA continuing posterior towards base of skull and unable to be safely mobilized to allow endarterectomy  Complications:   None    Procedure and Technique:    After informed consent was obtained, the patient was brought to the operating room and placed in the supine position  Perioperative IV antibiotics were given  She was given anesthesia and endotracheally intubated  Ultrasound was used to examine the carotid artery  The patient was placed with the head turned laterally and slightly extended, with a shoulder roll  She was then prepped and draped in the usual sterile fashion exposing the neck  A timeout was performed  A marking pen was used to paolo the incision overlying the carotid artery  A 15-blade was used to make the incision  Cautery was used to dissect through the soft tissue and platysma    The sternocleidomastoid muscle was identified, dissected free along its medial border, and retracted laterally  Next, the internal jugular vein was identified and freed along its medial border  The facial vein was identified, ligated with 2-0 silk sutures, and transected  The internal jugular vein was then retracted laterally, exposing the carotid artery  8000 units of IV heparin were given  The dissection was continued at the common carotid artery  It was freed circumferentially and encircled with a vessel loop  Next, the external carotid artery and superior thyroid artery were identified, freed circumferentially  Next, attention was turned to mobilizing the internal carotid artery  The hypoglossal nerve overriding the bifurcation  The digastric muscle was mobilized and fully retracted  However, the nerve was unable to be mobilized sufficiently to allow exposure of the bifurcation  The ICA was observed to be moving posterior and towards the base of the skull  The decision was made to not proceed with exposure of the ICA and abort the surgery  The wound was then copiously irrigated with irrigatino  Next 40mg Protamine was given  It was then checked for hemostasis  Surgicel was used to aid with hemostasis  The platysma was then closed with 3-0 vicryl running suture and the skin was closed with 4-0 monocryl running subcuticular layer  The incision was dressed with Histoacryl glue  The patient was allowed to awaken and was extubated  She was moving all four extremities and following commands  She was then transferred to the PACU for postoperative care           I was present for the entire procedure    Patient Disposition:  PACU  and Critical Care Unit      SIGNATURE: Josie Jay DO  DATE: January 31, 2023  TIME: 12:13 PM

## 2023-01-31 NOTE — ANESTHESIA PREPROCEDURE EVALUATION
Procedure:  ENDARTERECTOMY ARTERY CAROTID (Left: Neck)    Relevant Problems   CARDIO   (+) Angina pectoris (HCC)   (+) Benign essential hypertension   (+) WELLINGTON (dyspnea on exertion)   (+) Essential hypertension   (+) Familial hyperlipidemia   (+) Hyperlipidemia   (+) Hypertensive heart disease with congestive heart failure (HCC)   (+) Migraines   (+) Other chest pain   (+) Paroxysmal atrial fibrillation (HCC)   (+) Premature atrial contractions      GI/HEPATIC   (+) Acute gastric ulcer without hemorrhage or perforation   (+) Gastroesophageal reflux disease without esophagitis   (+) Hepatic steatosis      /RENAL   (+) Acute pyelonephritis      MUSCULOSKELETAL   (+) Arthritis   (+) Chronic low back pain   (+) Patellar tendinitis of left knee      NEURO/PSYCH   (+) Acute CVA (cerebrovascular accident) ([facility] [address] )   (+) Anxiety   (+) Anxiety and depression   (+) Chronic low back pain   (+) Continuous opioid dependence (HCC)   (+) Headache   (+) Migraines      PULMONARY   (+) Acute URI   (+) Acute upper respiratory infection   (+) Asthma due to seasonal allergies   (+) COPD (chronic obstructive pulmonary disease) (HCC)   (+) WELLINGTON (dyspnea on exertion)   (+) Moderate persistent asthma without complication   (+) CAMILLE (obstructive sleep apnea)   (+) Shortness of breath        Physical Exam    Airway    Mallampati score: II  TM Distance: >3 FB  Neck ROM: full     Dental   Comment: Many caps,     Cardiovascular  Cardiovascular exam normal    Pulmonary  Pulmonary exam normal     Other Findings        Anesthesia Plan  ASA Score- 4     Anesthesia Type- general with ASA Monitors  Additional Monitors: arterial line  Airway Plan: ETT  Plan Factors-    Chart reviewed  Existing labs reviewed  Patient summary reviewed  Obstructive sleep apnea risk education given perioperatively  Induction- intravenous  Postoperative Plan-     Informed Consent- Anesthetic plan and risks discussed with patient

## 2023-01-31 NOTE — ASSESSMENT & PLAN NOTE
Vascular duplex carotids 10/7/22: R<50% stenosis, L internal carotid >70% stenosis  Asymptomatic  Pt presented 1/31/23 for elective endarterectomy of LIC asymptomatic stenosis  Pt taken to OR with Vascular surgery, POD 0 s/p aborted endarterectomy  Operation aborted d/t difficult anatomy  Pt admitted to ICU post-operatively with Critical Care consult, Vasc Surg primary  c/o L lower facial droop and numbness, pain in L neck extending up L face into forehead, rated 8/10  History of L facial droop, first noted Sept 2022, attributed to TIA  Pt reports this had improved and is worse in the post-op setting  Surgical incision c/d/i  Mild swelling without induration      -Pain: scheduled Tylenol with Oxycodone prns for moderate and severe pain, dilaudid for breakthrough  Pt is on tramadol at home for chronic pain    -Frequent Neuro checks   -Care per primary

## 2023-02-01 ENCOUNTER — DOCUMENTATION (OUTPATIENT)
Dept: VASCULAR SURGERY | Facility: CLINIC | Age: 63
End: 2023-02-01

## 2023-02-01 PROBLEM — G51.0 PARTIAL FACIAL NERVE PALSY: Status: ACTIVE | Noted: 2023-02-01

## 2023-02-01 LAB
ANION GAP SERPL CALCULATED.3IONS-SCNC: 8 MMOL/L (ref 4–13)
APTT PPP: 29 SECONDS (ref 23–37)
BUN SERPL-MCNC: 10 MG/DL (ref 5–25)
CALCIUM SERPL-MCNC: 9.4 MG/DL (ref 8.4–10.2)
CHLORIDE SERPL-SCNC: 102 MMOL/L (ref 96–108)
CO2 SERPL-SCNC: 29 MMOL/L (ref 21–32)
CREAT SERPL-MCNC: 0.81 MG/DL (ref 0.6–1.3)
DME PARACHUTE DELIVERY DATE REQUESTED: NORMAL
DME PARACHUTE ITEM DESCRIPTION: NORMAL
DME PARACHUTE ORDER STATUS: NORMAL
DME PARACHUTE SUPPLIER NAME: NORMAL
DME PARACHUTE SUPPLIER PHONE: NORMAL
ERYTHROCYTE [DISTWIDTH] IN BLOOD BY AUTOMATED COUNT: 12.5 % (ref 11.6–15.1)
GFR SERPL CREATININE-BSD FRML MDRD: 76 ML/MIN/1.73SQ M
GLUCOSE SERPL-MCNC: 102 MG/DL (ref 65–140)
HCT VFR BLD AUTO: 40.2 % (ref 34.8–46.1)
HGB BLD-MCNC: 12.9 G/DL (ref 11.5–15.4)
INR PPP: 1 (ref 0.84–1.19)
MCH RBC QN AUTO: 29.5 PG (ref 26.8–34.3)
MCHC RBC AUTO-ENTMCNC: 32.1 G/DL (ref 31.4–37.4)
MCV RBC AUTO: 92 FL (ref 82–98)
PLATELET # BLD AUTO: 233 THOUSANDS/UL (ref 149–390)
PMV BLD AUTO: 9.1 FL (ref 8.9–12.7)
POTASSIUM SERPL-SCNC: 3.8 MMOL/L (ref 3.5–5.3)
PROTHROMBIN TIME: 13.2 SECONDS (ref 11.6–14.5)
RBC # BLD AUTO: 4.37 MILLION/UL (ref 3.81–5.12)
SODIUM SERPL-SCNC: 139 MMOL/L (ref 135–147)
WBC # BLD AUTO: 8.79 THOUSAND/UL (ref 4.31–10.16)

## 2023-02-01 RX ORDER — LORAZEPAM 2 MG/ML
0.5 INJECTION INTRAMUSCULAR EVERY 6 HOURS PRN
Status: DISCONTINUED | OUTPATIENT
Start: 2023-02-01 | End: 2023-02-01

## 2023-02-01 RX ORDER — DEXAMETHASONE SODIUM PHOSPHATE 4 MG/ML
4 INJECTION, SOLUTION INTRA-ARTICULAR; INTRALESIONAL; INTRAMUSCULAR; INTRAVENOUS; SOFT TISSUE EVERY 8 HOURS SCHEDULED
Status: COMPLETED | OUTPATIENT
Start: 2023-02-01 | End: 2023-02-01

## 2023-02-01 RX ORDER — HEPARIN SODIUM 5000 [USP'U]/ML
5000 INJECTION, SOLUTION INTRAVENOUS; SUBCUTANEOUS EVERY 8 HOURS SCHEDULED
Status: DISCONTINUED | OUTPATIENT
Start: 2023-02-01 | End: 2023-02-03

## 2023-02-01 RX ADMIN — ASPIRIN 81 MG: 81 TABLET, CHEWABLE ORAL at 10:00

## 2023-02-01 RX ADMIN — DILTIAZEM HYDROCHLORIDE 120 MG: 120 CAPSULE, EXTENDED RELEASE ORAL at 22:25

## 2023-02-01 RX ADMIN — LORAZEPAM 1 MG: 1 TABLET ORAL at 19:51

## 2023-02-01 RX ADMIN — OXYCODONE HYDROCHLORIDE AND ACETAMINOPHEN 1000 MG: 500 TABLET ORAL at 10:00

## 2023-02-01 RX ADMIN — SPIRONOLACTONE 25 MG: 25 TABLET ORAL at 17:58

## 2023-02-01 RX ADMIN — SPIRONOLACTONE 25 MG: 25 TABLET ORAL at 10:00

## 2023-02-01 RX ADMIN — DEXAMETHASONE SODIUM PHOSPHATE 4 MG: 4 INJECTION INTRA-ARTICULAR; INTRALESIONAL; INTRAMUSCULAR; INTRAVENOUS; SOFT TISSUE at 17:59

## 2023-02-01 RX ADMIN — HEPARIN SODIUM 5000 UNITS: 5000 INJECTION INTRAVENOUS; SUBCUTANEOUS at 05:33

## 2023-02-01 RX ADMIN — METOPROLOL SUCCINATE 100 MG: 50 TABLET, EXTENDED RELEASE ORAL at 17:57

## 2023-02-01 RX ADMIN — FAMOTIDINE 20 MG: 20 TABLET, FILM COATED ORAL at 21:32

## 2023-02-01 RX ADMIN — DEXAMETHASONE SODIUM PHOSPHATE 4 MG: 4 INJECTION INTRA-ARTICULAR; INTRALESIONAL; INTRAMUSCULAR; INTRAVENOUS; SOFT TISSUE at 10:11

## 2023-02-01 RX ADMIN — HYDROXYZINE HYDROCHLORIDE 50 MG: 25 TABLET ORAL at 21:31

## 2023-02-01 RX ADMIN — DEXAMETHASONE SODIUM PHOSPHATE 4 MG: 4 INJECTION INTRA-ARTICULAR; INTRALESIONAL; INTRAMUSCULAR; INTRAVENOUS; SOFT TISSUE at 21:32

## 2023-02-01 RX ADMIN — ONDANSETRON 4 MG: 4 TABLET, ORALLY DISINTEGRATING ORAL at 17:53

## 2023-02-01 RX ADMIN — HEPARIN SODIUM 5000 UNITS: 5000 INJECTION INTRAVENOUS; SUBCUTANEOUS at 13:54

## 2023-02-01 RX ADMIN — FUROSEMIDE 40 MG: 40 TABLET ORAL at 10:00

## 2023-02-01 RX ADMIN — OXYCODONE HYDROCHLORIDE 10 MG: 10 TABLET ORAL at 13:55

## 2023-02-01 RX ADMIN — HEPARIN SODIUM 5000 UNITS: 5000 INJECTION INTRAVENOUS; SUBCUTANEOUS at 21:33

## 2023-02-01 RX ADMIN — OXYCODONE HYDROCHLORIDE 10 MG: 10 TABLET ORAL at 19:50

## 2023-02-01 RX ADMIN — METOPROLOL SUCCINATE 100 MG: 50 TABLET, EXTENDED RELEASE ORAL at 10:17

## 2023-02-01 RX ADMIN — ACETAMINOPHEN 325MG 975 MG: 325 TABLET ORAL at 23:10

## 2023-02-01 RX ADMIN — ACETAMINOPHEN 325MG 975 MG: 325 TABLET ORAL at 05:33

## 2023-02-01 RX ADMIN — ONDANSETRON 4 MG: 4 TABLET, ORALLY DISINTEGRATING ORAL at 06:36

## 2023-02-01 RX ADMIN — DILTIAZEM HYDROCHLORIDE 120 MG: 120 CAPSULE, EXTENDED RELEASE ORAL at 10:00

## 2023-02-01 RX ADMIN — OXYCODONE 5 MG: 5 TABLET ORAL at 10:09

## 2023-02-01 RX ADMIN — LORAZEPAM 1 MG: 1 TABLET ORAL at 10:09

## 2023-02-01 RX ADMIN — ACETAMINOPHEN 325MG 975 MG: 325 TABLET ORAL at 17:57

## 2023-02-01 RX ADMIN — DOCUSATE SODIUM AND SENNOSIDES 1 TABLET: 8.6; 5 TABLET, FILM COATED ORAL at 21:32

## 2023-02-01 RX ADMIN — HYDROMORPHONE HYDROCHLORIDE 0.5 MG: 1 INJECTION, SOLUTION INTRAMUSCULAR; INTRAVENOUS; SUBCUTANEOUS at 15:20

## 2023-02-01 RX ADMIN — OXYCODONE HYDROCHLORIDE 10 MG: 10 TABLET ORAL at 06:35

## 2023-02-01 NOTE — PROGRESS NOTES
Vascular Nurse Navigator Post Op Education    Met with patient at bedside to introduce myself as Vascular Nurse Navigator and explained my role  Patient is appropriate and accepting to education  Patient was educated with Review of written materials provided, Teachback, Explanation, Demonstration and Question & Answer on expectations of post op care and recovery on Left Neck Exploration of Carotid Artery, Aborted Left Carotid Endarterectomy  Patient is a former smoker, quit 4 years ago, as such Smoking effects on the lungs, tobacco triggers and Smoking cessation was reviewed  Education provided to patient on infection prevention, activity limitations, when to call the office, importance of follow up, and incisional care  Discharge instruction handout provided to patient to review

## 2023-02-01 NOTE — ASSESSMENT & PLAN NOTE
Vascular duplex carotids 10/7/22: R<50% stenosis, L internal carotid >70% stenosis  Asymptomatic  Pt presented 1/31/23 for elective endarterectomy of LIC asymptomatic stenosis  Pt taken to OR with Vascular surgery, POD 0 s/p aborted endarterectomy  Operation aborted d/t difficult anatomy  Pt admitted to ICU post-operatively with Critical Care consult, Vasc Surg primary  c/o L lower facial droop and numbness, pain in L neck extending up L face into forehead, rated 8/10  History of L facial droop, first noted Sept 2022, attributed to TIA  Pt reports this had improved and is worse in the post-op setting  Surgical incision c/d/i  Mild swelling without induration      -Pain: scheduled Tylenol with Oxycodone prns for moderate and severe pain, dilaudid for breakthrough  Pt is on tramadol at home for chronic pain    -Frequent Neuro checks   -Care per primary, anticipate transfer out of ICU later today

## 2023-02-01 NOTE — ASSESSMENT & PLAN NOTE
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

## 2023-02-01 NOTE — PLAN OF CARE
Problem: OCCUPATIONAL THERAPY ADULT  Goal: Performs self-care activities at highest level of function for planned discharge setting  See evaluation for individualized goals  Description: Treatment Interventions: ADL retraining, Functional transfer training, Endurance training, UE strengthening/ROM, Patient/family training, Equipment evaluation/education, Neuromuscular reeducation, Compensatory technique education, Energy conservation, Activityengagement          See flowsheet documentation for full assessment, interventions and recommendations  Note: Limitation: Decreased ADL status, Decreased UE strength, Decreased Safe judgement during ADL, Decreased endurance, Decreased self-care trans  Prognosis: Good  Assessment: Je Grant is a 57 y o  female with past medical history inclusive of TIA, carotid artery stenosis, paroxysmal A  Fib, hypertension, hyperlipidemia, anxiety, depression who is originally admitted to the vascular surgery service for elective endarterectomy of the left internal carotid for severe asymptomatic stenosis  Patient originally presented to PCP for left-sided facial droop in September 2022, found to have internal carotid stenosis on MRI, greater than 70% occlusion confirmed on ultrasound  Reports facial droop had improved after initial presentation, diagnosed with TIA, no prior history of stroke  She was taken to the OR 1/31/2023 for elective surgery, however planned endarterectomy was aborted due to difficult anatomy  Critical care consulted for medical management  Pt with active orders for OT and up with assistance  Prior to admission, pt lives alone in a single level 1st floor apt with 0 XIOMARA  Apt has a tub shower, standard toilets  Owns no DME  I with ADLS, IADLs, mobility with no AD  Pt drives  Retired  Reports 0 falls  Only support lives 90 miles away  Local neighbors able to provide limited support   Upon evaluation, pt presenting below functional baseline with deficits noted in strength, activity tolerance, balance, safety awareness which is limiting pts functional ability to complete ADLs/ IADLs, functional transfers and functional mobility  Vitals: 166/77 post activity  Pt current level of function: bed mobility - unable to assess; transfers- supervision ; functional mobility-supervision with RW; UB dressing / bathing - supervision; LB dressing/ bathing- Olaf; toileting - Olaf  Pt would benefit from skilled OT 3-5 x/wk to maximize functional independence  OT DC recommendation: home with home health       OT Discharge Recommendation: Home with home health rehabilitation

## 2023-02-01 NOTE — ASSESSMENT & PLAN NOTE
Tele reviewed  Pt currently in NSR with occasional PACs  Appears to have had brief episode of Afib at approx 05:00 this morning      From vasc surg standpoint, continue to hold home Eliquis today, 2/1/23

## 2023-02-01 NOTE — ASSESSMENT & PLAN NOTE
Pt with BP in urgent range on [facility], received labetalol 10 mg IV Once at 12:30  Upon arrival in the ICU, /84, improved somewhat after administration of Dilaudid for breakthrough pain to SBP 170s  Pt reports taking home metoprolol on morning of admission  Did not take home spironolactone or diltiazem on morning of admission       Pt has severe allergies to ACE-I, ARB, Hydralazine    BPs improved since restarting home antihypertensives    Continue home meds

## 2023-02-01 NOTE — SPEECH THERAPY NOTE
Speech Language/Pathology  Speech/Language Pathology  Assessment    Patient Name: Linh Hitchcock  Today's Date: 2/1/2023     Problem List  Principal Problem:    Stenosis of left carotid artery  Active Problems:    Anxiety and depression    Benign essential hypertension    Paroxysmal atrial fibrillation (HCC)    Partial facial nerve palsy    Past Medical History  Past Medical History:   Diagnosis Date   • [facility]   • Allergic    • Anxiety    • Arthritis    • Asthma    • Back pain     and muscle pain   • Bruises easily    • Chronic pain disorder     Interstitial pain   • Colon polyp    • Dental bridge present    • Depression    • Eczema    • GERD (gastroesophageal reflux disease)    • Heart murmur    • History of blood clots     per pt "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"   • History of pneumonia    • Hives    • Hyperlipidemia    • Hypertension    • Infertility, female    • Interstitial cystitis    • Migraine     sees neurologist   • Motion sickness    • Palpitations    • PONV (postoperative nausea and vomiting)    • Psychiatric disorder    • TIA (transient ischemic attack) 09/2022    per pt --"had MRI and 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"   • Urinary incontinence     wears Pads   • Wears glasses      Past Surgical History  Past Surgical History:   Procedure Laterality Date   • COLONOSCOPY     • DILATION AND CURETTAGE OF UTERUS     • EGD     • EXPLORATORY LAPAROTOMY      for infertility   • HAND SURGERY      Hand excision of tendon cyst   • HI LAPAROSCOPIC APPENDECTOMY N/A 05/03/2022    Procedure: APPENDECTOMY LAPAROSCOPIC;  Surgeon:  Lynda Salcido MD;  Location: [facility];  Service: General   • HI TEAEC W/PATCH GRF [facility]ID [facility]B [facility] 1/31/2023    Procedure: EXPLORATION OF LEFT CAROTID ARTERY; ABORTED ENDARTERECTOMY ARTERY CAROTID;  Surgeon: Tameka Sequeira DO;  Location: [facility];  Service: Vascular   • SUPERIOR MESTENTERIC ARTERY STENT     • [address] [facility]          Bedside Swallow Evaluation:    Summary:  Pt presented w/ clear speech  Lingual strength and ROM WNL  Left side of lower face and neck edematous from sx  Sensation intact  Lip protrusion and seal wnl  No labial elevation on left when asked to smile, but movement noted in speech and when she laughed  Symmetrical at rest  Tolerated thin liquids by straw and purees wnl  Bolus control, formation, and transfer wnl  Swallows prompt  Adequate appearing laryngeal rise  No cough or wet vocal quality  Oral/pharynegal stages wnl for purees and thin  Pt wasn't sure if she was ready for solids  Recommendations:  Diet: Puree for now  Will f/u for upgrade  Liquid:thin  Meds: ok whole w/ thin if small  Break if large  (nurse reported no issues)  Supervision: intermittent  Positioning:Upright  Pt to take PO/Meds only when fully alert and upright  Oral care  Aspiration precautions  Reflux precautions  Therapy Prognosis: favorable as edema resolves  Frequency: 2-5x/wk as able and indicated    Consider consult w/:  Rehab  Nutrition    Dysphagia LTG  -Patient will demonstrate safe and effective oral intake (without overt s/s significant oral/pharyngeal dysphagia including s/s penetration or aspiration) for the highest appropriate diet level  1 Pt will tolerate least restrictive diet w/out s/s aspiration or oral/pharyngeal difficulties  2 Pt will will effectively manipulate/masticate and transfer purees/solids w/out s/s dysphagia/aspiration  3 Pt will tolerate thin liquids w/out s/s aspiration  H&P/Admit info/ pertinent provider notes: (PMH noted above)  Carmen Sales DO at 2/1/2023  4:04 PM  I was present for the entire procedure, A qualified resident physician was not available, and A physician assistant was required during the procedure for exposure/dissection due to challenging high carotid bifurcation  I agree with the note by Dr Kelli Faust      Ultimately, procedure aborted as it was deemed unsafe to proceed with endarterectomy due in adequate ability to mobilize and exposure internal carotid artery given high carotid bifurcation  Patient Crystal Sousa a 57 y  o  female  Patient is here today to review results of Celiac/Messenteric duplex  Patient c/o pain in the left upper quadrant of her abdomen and flank pain  She is s/p a SMA stent done 8/1/2021 by Dr Johanne Phelan at [facility]  She also c/o wt loss  Patient is taking Eliquis and ASA 81 mg  Patient is a non-smoker    Festus Course returns to the office to review mesenteric as well as carotid duplex  She continues with a myriad of symptoms/complaints including left facial numbness/weakness as well as "heartbeat" in left ear, nausea and bruising  Special Studies:  noted    Previous MBS:  none  Seen bedside at [facility] 9/1/22  regular diet w/ thi lqiuids  Adm at that time on stroke pathway  She experienced left side facial numbness with some slurred speech leading to her admission  Patient's goal: none stated    Did the pt report pain? Left neck from sx  If yes, was nursing notified/was it addressed? vascular sx in and aware    Reason for consult:  R/o aspiration  Determine safest and least restrictive diet    Precautions:  Fall    Food Allergies:  none   Current Diet:  npo sips w/ meds   Premorbid diet:  regular   O2 requirement: none   Social/Prior living Lives alone   Voice/Speech: Wnl  High pitch  Follows commands:  yes   Cognitive status:  alert  Oral [facility] exam:  Dentition:partial natural    Lips (VII):almost no elevation on L w/ request to smile however there was movement w/ speech and when she laughed  Protrusion and seal WNL  Tongue (XII):wnl  Face/oral sensation (V):+  Secretion management: wnl  Left side of neck edematous at sx site  Suspect some internal edema  Items administered:  Puree, thin  She was unsure if she wanted solids       Esophageal stage:  H/o GERD    Aspiration precautions posted    Results d/w:  Pt, nursing,physician

## 2023-02-01 NOTE — PROGRESS NOTES
[address] [facility]  Progress Note Low Brewer 1960, 57 y o  female MRN: [id number]  Unit/Bed#: ICU 06 Encounter: [id number]  Primary Care Provider: Colletta Benes, DO   Date and time admitted to hospital: 1/31/2023  7:43 AM    * Stenosis of left carotid artery  Assessment & Plan  Vascular duplex carotids 10/7/22: R<50% stenosis, L internal carotid >70% stenosis  Asymptomatic  Pt presented 1/31/23 for elective endarterectomy of LIC asymptomatic stenosis  Pt taken to OR with Vascular surgery, POD 0 s/p aborted endarterectomy  Operation aborted d/t difficult anatomy  Pt admitted to ICU post-operatively with Critical Care consult, Vasc Surg primary  c/o L lower facial droop and numbness, pain in L neck extending up L face into forehead, rated 8/10  History of L facial droop, first noted Sept 2022, attributed to TIA  Pt reports this had improved and is worse in the post-op setting  Surgical incision c/d/i  Mild swelling without induration      -Pain: scheduled Tylenol with Oxycodone prns for moderate and severe pain, dilaudid for breakthrough  Pt is on tramadol at home for chronic pain  -Frequent Neuro checks   -Care per primary, anticipate transfer out of ICU later today    Benign essential hypertension  Assessment & Plan  Pt with BP in urgent range on Hospital D 0, received labetalol 10 mg IV Once at 12:30  Upon arrival in the ICU, /84, improved somewhat after administration of Dilaudid for breakthrough pain to SBP 170s  Pt reports taking home metoprolol on morning of admission  Did not take home spironolactone or diltiazem on morning of admission  Pt has severe allergies to ACE-I, ARB, Hydralazine    BPs improved since restarting home antihypertensives    Continue home meds    Paroxysmal atrial fibrillation [facility])  Assessment & Plan  Tele reviewed  Pt currently in NSR with occasional PACs  Appears to have had brief episode of Afib at approx 05:00 this morning      From vasc surg standpoint, continue to hold home Eliquis today, 23    Anxiety and depression  Assessment & Plan  Pt anxious and tearful on exam related to health concerns  Continue home meds hydroxyzine QHS and Ativan Q8H prn    Partial facial nerve palsy  Assessment & Plan  Suspect UMN palsy of L facial nerve  Sensation is grossly intact, motor function in forehead grossly normal, decreased motor function appreciated with smiling/bearing teeth in L side of mouth  Muscles of mastication symmetric diminished on L evening of 32, symmetric 23  VTE Pharmacologic Prophylaxis: VTE Score: 4 Moderate Risk (Score 3-4) - Pharmacological DVT Prophylaxis Ordered: heparin  Patient Centered Rounds: I performed bedside rounds with nursing staff today  Discussions with Specialists or Other Care Team Provider: Vascular Surgery    Education and Discussions with Family / Patient: Will update family if seen at bedside  Current Length of Stay: 1 day(s)  Current Patient Status: Inpatient   Discharge Plan: per primary team; anticipate transfer out of ICU later  today    Code Status: Level 1 - Full Code    Subjective:   Overnight, pt reportedly anxious, removed A-line  No other events  This morning, Ms Jones is seen lying in bed, awake, alert, engaged with exam, in no acute distress  She reports pain in neck, currently 8 out of 10 in severity, has just received analgesics that have not yet taken effect  Reports poor sleep overnight due to pain and frequent toileting  Denies new or worsening symptoms this morning  Reports swallowing is somewhat improved from yesterday  Denies change in voice, drooling  Denies fevers, chills, CP  Reports SOB associated w palpitations overnight, however presently denies either  Breathing comfortably on 2 L NC satting 94%  Last BM day before admission       Objective:     Vitals:   Temp (24hrs), Av 7 °F (36 5 °C), Min:97 1 °F (36 2 °C), Max:98 1 °F (36 7 °C)    Temp:  [97 1 °F (36 2 °C)-98 1 °F (36 7 °C)] 97 5 °F (36 4 °C)  HR:  [62-88] 70  Resp:  [8-31] 11  BP: ()/(48-82) 158/73  SpO2:  [87 %-97 %] 92 %  Body mass index is 36 33 kg/m²  Input and Output Summary (last 24 hours): Intake/Output Summary (Last 24 hours) at 2/1/2023 0847  Last data filed at 2/1/2023 0601  Gross per 24 hour   Intake 3875 ml   Output 4000 ml   Net -125 ml       Physical Exam:   Physical Exam  Vitals and nursing note reviewed  Constitutional:       General: She is not in acute distress  Appearance: She is not ill-appearing, toxic-appearing or diaphoretic  HENT:      Head: Normocephalic and atraumatic  Eyes:      General: No scleral icterus  Right eye: No discharge  Left eye: No discharge  Conjunctiva/sclera: Conjunctivae normal    Neck:      Comments: Increased swelling and bruising in L lateral neck surrounding surgical incision  Cardiovascular:      Rate and Rhythm: Normal rate and regular rhythm  Pulses: Normal pulses  Heart sounds: Normal heart sounds  No murmur heard  No friction rub  No gallop  Pulmonary:      Effort: Pulmonary effort is normal  No respiratory distress  Breath sounds: Normal breath sounds  No stridor  No wheezing, rhonchi or rales  Chest:      Chest wall: No tenderness  Abdominal:      General: Bowel sounds are normal  There is no distension  Palpations: Abdomen is soft  Tenderness: There is no abdominal tenderness  There is no guarding or rebound  Musculoskeletal:      Cervical back: Normal range of motion and neck supple  Tenderness (L lateral neck tender to light palpation) present  No rigidity  Skin:     General: Skin is warm and dry  Findings: Bruising (adjacent to surgical incision in L lateral neck) present  Neurological:      Mental Status: She is alert and oriented to person, place, and time  Cranial Nerves: Cranial nerve deficit (L lower facial droop) present  Sensory: No sensory deficit  Motor: No weakness  Psychiatric:         Mood and Affect: Mood normal          Behavior: Behavior normal         Additional Data:     Labs:  Results from last 7 days   Lab Units 02/01/23  0547   WBC Thousand/uL 8 79   HEMOGLOBIN g/dL 12 9   HEMATOCRIT % 40 2   PLATELETS Thousands/uL 233     Results from last 7 days   Lab Units 02/01/23  0547   SODIUM mmol/L 139   POTASSIUM mmol/L 3 8   CHLORIDE mmol/L 102   CO2 mmol/L 29   BUN mg/dL 10   CREATININE mg/dL 0 81   ANION GAP mmol/L 8   CALCIUM mg/dL 9 4   GLUCOSE RANDOM mg/dL 102     Results from last 7 days   Lab Units 02/01/23  0547   INR  1 00                   Lines/Drains:  Invasive Devices     Peripheral Intravenous Line  Duration           Peripheral IV 01/31/23 Dorsal (posterior); Left Hand 1 day    Peripheral IV 01/31/23 Left;Lateral Wrist <1 day                      Imaging: No pertinent imaging reviewed      Recent Cultures (last 7 days):         Last 24 Hours Medication List:   Current Facility-Administered Medications   Medication Dose Route Frequency Provider Last Rate   • acetaminophen  975 mg Oral Q6H [address] Vicky Car DO     • vitamin C  1,000 mg Oral Daily Vicky Car DO     • aspirin  81 mg Oral Daily Vicky Car DO     • dexamethasone  4 mg Intravenous [facility] Vicky Car DO     • diltiazem  120 mg Oral Q12H [address] Estelle Quinn MD     • famotidine  20 mg Oral HS Vicky Car DO     • fluticasone-vilanterol  1 puff Inhalation Daily Vicky Car DO     • furosemide  40 mg Oral Daily Vicky Car DO     • heparin (porcine)  5,000 Units Subcutaneous [facility] Estelle Quinn MD     • HYDROmorphone  0 5 mg Intravenous Q2H PRN Estelle Quinn MD     • hydrOXYzine HCL  50 mg Oral HS Vicky Car DO     • LORazepam  1 mg Oral Q8H PRN Estelle Quinn MD     • metoprolol succinate  100 mg Oral BID Vicky Car DO     • naloxone  0 04 mg Intravenous Q1MIN PRN Estelle Quinn MD     • ondansetron  4 mg Oral Q6H PRN Radha Pacer, DO     • oxyCODONE  10 mg Oral Q4H PRN Sammy Smith MD     • oxyCODONE  5 mg Oral Q4H PRN Radha Pacer, DO     • phenazopyridine  200 mg Oral TID PRN Radha Pacer, DO     • senna-docusate sodium  1 tablet Oral HS Sammy Smith MD     • spironolactone  25 mg Oral BID Sammy Smith MD          Today, Patient Was Seen By: Sammy Smith MD    **Please Note: This note may have been constructed using a voice recognition system  **

## 2023-02-01 NOTE — PLAN OF CARE
Problem: PHYSICAL THERAPY ADULT  Goal: Performs mobility at highest level of function for planned discharge setting  See evaluation for individualized goals  Description: Treatment/Interventions: Functional transfer training, LE strengthening/ROM, Therapeutic exercise, Endurance training, Patient/family training, Equipment eval/education, Bed mobility, Gait training, Compensatory technique education, Continued evaluation, Spoke to nursing, OT, Spoke to case management  Equipment Recommended: Phylicia Hong, Other (Comment) (RW, BSC)       See flowsheet documentation for full assessment, interventions and recommendations  Outcome: Progressing  Note: Prognosis: Good  Problem List: Decreased strength, Decreased endurance, Impaired balance, Decreased mobility, Obesity, Pain  Assessment: Nadya Knight is a 57 y o  female with past medical history inclusive of TIA, carotid artery stenosis, paroxysmal A  Fib, hypertension, hyperlipidemia, anxiety, depression who is originally admitted to the vascular surgery service for elective endarterectomy of the left internal carotid for severe asymptomatic stenosis  Patient originally presented to PCP for left-sided facial droop in September 2022, found to have internal carotid stenosis on MRI, greater than 70% occlusion confirmed on ultrasound  Reports facial droop had improved after initial presentation, diagnosed with TIA, no prior history of stroke  She was taken to the OR 1/31/2023 for elective surgery, however planned endarterectomy was aborted due to difficult anatomy  Critical care consulted for medical management  PT consulted  Prior to admission resides alone in apt with elevator access  Independent without AD use  Currently presents with impairments in strength, balance, activity tolerance, pain, functional mobility and locomotion  All mobility slowed but at S level  Gait slowed, decreased foot clearance and step length    Inconsistent dory with occasional lateral displacement  Will benefit from skilled PT in order to progress and optimize functional outcomes  The patient's AM-PAC Basic Mobility Inpatient Short Form Raw Score is 22  A Raw score of greater than 16 suggests the patient may benefit from discharge to home  Please also refer to the recommendation of the Physical Therapist for safe discharge planning  Anticipate d/c to home with HHPT  See treatment note for progress  Will benefit from RW and BSC  Barriers to Discharge: Decreased caregiver support  Barriers to Discharge Comments: lives alone  PT Discharge Recommendation: Home with home health rehabilitation    See flowsheet documentation for full assessment

## 2023-02-01 NOTE — UTILIZATION REVIEW
Initial Clinical Review    Elective Inpatient surgical procedure  Age/Sex: 57 y o  female  Surgery Date: 01/31/2023  Procedure: Left - EXPLORATION OF LEFT CAROTID ARTERY; ABORTED ENDARTERECTOMY ARTERY CAROTID  Anesthesia: General  Operative Findings:   Left Carotid bifurcation high and deep in neck  Left hypoglossal nerve overriding the bifurcation  Left ICA continuing posterior towards base of skull and unable to be safely mobilized to allow endarterectomy  Procedure Note:  Next, attention was turned to mobilizing the internal carotid artery  The hypoglossal nerve overriding the bifurcation  The digastric muscle was mobilized and fully retracted  However, the nerve was unable to be mobilized sufficiently to allow exposure of the bifurcation  The ICA was observed to be moving posterior and towards the base of the skull  The decision was made to not proceed with exposure of the ICA and abort the surgery       The wound was then copiously irrigated with irrigatino  Next 40mg Protamine was given  It was then checked for hemostasis  Surgicel was used to aid with hemostasis  The platysma was then closed with 3-0 vicryl running suture and the skin was closed with 4-0 monocryl running subcuticular layer  The incision was dressed with Histoacryl glue      The patient was allowed to awaken and was extubated  She was moving all four extremities and following commands  She was then transferred to the PACU for postoperative care  01/31 Critical Care Consult; Pt was taken to the OR on 01/31 fot elective surgery, however planned endarterectomy was aborted due to difficult anatomy  Transferred to ICU for postop care  Surgical incision c/d/i  Mild swelling without induration  Plan: Pain: scheduled Tylenol with Oxycodone prns for moderate and severe pain, dilaudid for breakthrough  Frequent Neuro checks  POD#1 Progress Note:   Pt w/ aborted L carotid endarterectomy on 01/31 d/t difficult anatomy    Pt awake, alert, c/o L neck pain at incision site  Tolerating po meds, limited po intake  OOB, voiding  Reports nausea, vomiting and some difficulty swallowing  He has evidence of marginal mandibular nerve palsy, mild hypoglossal nerve palsy  Plan: NPO c Meds  Bedside speech eval  Cont neuro, neck checks  VTEppx: SQH  Hold home eliquis (pAfib)  Aspirin, Statin  Cont tylenol for pain, diluadid and oxycodone prn  Cont lasix, cardizem, metoprolol, spironolactone  Cont pyridium       Admission Orders: Date/Time/Statement:   Admission Orders (From admission, onward)     Ordered        01/31/23 1213  Inpatient Admission  Once                      Orders Placed This Encounter   Procedures   • Inpatient Admission     Standing Status:   Standing     Number of Occurrences:   1     Order Specific Question:   Level of Care     Answer:   Level 1 Stepdown [13]     Order Specific Question:   Estimated length of stay     Answer:   Inpatient Only Surgery     Vital Signs: /60   Pulse 74   Temp 97 5 °F (36 4 °C) (Oral)   Resp (!) 11   Ht 5' 4" (1 626 m)   Wt 96 kg (211 lb 10 3 oz)   LMP  (LMP Unknown)   SpO2 92%   BMI 36 33 kg/m²     Pertinent Labs/Diagnostic Test Results:   No orders to display         Results from last 7 days   Lab Units 02/01/23  0547 01/31/23  1256   WBC Thousand/uL 8 79  --    HEMOGLOBIN g/dL 12 9  --    HEMATOCRIT % 40 2  --    PLATELETS Thousands/uL 233 167         Results from last 7 days   Lab Units 02/01/23  0547   SODIUM mmol/L 139   POTASSIUM mmol/L 3 8   CHLORIDE mmol/L 102   CO2 mmol/L 29   ANION GAP mmol/L 8   BUN mg/dL 10   CREATININE mg/dL 0 81   EGFR ml/min/1 73sq m 76   CALCIUM mg/dL 9 4             Results from last 7 days   Lab Units 02/01/23  0547   GLUCOSE RANDOM mg/dL 102       Results from last 7 days   Lab Units 02/01/23  0547   PROTIME seconds 13 2   INR  1 00   PTT seconds 29         Diet: NPO  DVT Prophylaxis: SCD, Heparin SC    Medications/Pain Control:   Scheduled Medications:  acetaminophen, 975 mg, Oral, Q6H [address]  vitamin C, 1,000 mg, Oral, Daily  aspirin, 81 mg, Oral, Daily  dexamethasone, 4 mg, Intravenous, Q8H BONIFACIO  diltiazem, 120 mg, Oral, Q12H BONIFACIO  famotidine, 20 mg, Oral, HS  fluticasone-vilanterol, 1 puff, Inhalation, Daily  furosemide, 40 mg, Oral, Daily  heparin (porcine), 5,000 Units, Subcutaneous, Q8H BONIFACIO  hydrOXYzine HCL, 50 mg, Oral, HS  metoprolol succinate, 100 mg, Oral, BID  senna-docusate sodium, 1 tablet, Oral, HS  spironolactone, 25 mg, Oral, BID    Continuous IV Infusions:  sodium chloride 0 9 % infusion  Rate: 125 mL/hr Dose: 125 mL/hr  Freq: Continuous Route: IV  Indications of Use: IV Hydration  Last Dose: Stopped (01/31/23 2245)  Start: 01/31/23 0815 End: 01/31/23 2200     PRN Meds:  HYDROmorphone, 0 5 mg, Intravenous, Q2H PRN  LORazepam, 1 mg, Oral, Q8H PRN 01/31 x 1, 02/01 x 1  naloxone, 0 04 mg, Intravenous, Q1MIN PRN  ondansetron, 4 mg, Oral, Q6H PRN 01/31 x 1, 02/01 x 1  oxyCODONE, 10 mg, Oral, Q4H PRN 01/31 x 1, 02/01 x 1  oxyCODONE, 5 mg, Oral, Q4H PRN 01/31 x 2, 02/01 x 1  phenazopyridine, 200 mg, Oral, TID PRN 01/31 x 1  trimethobenzamide (TIGAN) IM injection 200 mg IM once prn  ondansetron (ZOFRAN) injection 4 mg IV prn 01/31 x 1  fentaNYL (SUBLIMAZE) injection 25 mcg  q15 mins IV prn 01/31 x 4    Network Utilization Review Department  ATTENTION: Please call with any questions or concerns to [phone] and carefully listen to the prompts so that you are directed to the right person  All voicemails are confidential   Alcario Broad all requests for admission clinical reviews, approved or denied determinations and any other requests to dedicated fax number below belonging to the campus where the patient is receiving treatment   List of dedicated fax numbers for the Facilities:  FACILITY NAME UR FAX NUMBER   ADMISSION DENIALS (Administrative/Medical Necessity) [address] [facility] (Maternity/NICU/Pediatrics) [phone]   [facility] [address] [facility] [phone]   [facility] [place] [address] [phone]   [address] [place] [address] [facility] [place] [address] [phone] [address] [place] [address] [facility] [phone]

## 2023-02-01 NOTE — OCCUPATIONAL THERAPY NOTE
Occupational Therapy Evaluation     Patient Name: Belle Barbosa  Today's Date: 2/1/2023  Problem List  Principal Problem:    Stenosis of left carotid artery  Active Problems:    Anxiety and depression    Benign essential hypertension    Paroxysmal atrial fibrillation (HCC)    Partial facial nerve palsy    Past Medical History  Past Medical History:   Diagnosis Date    A-fib ([address] ) 03/2020    Allergic     Anxiety     Arthritis     Asthma     Back pain     and muscle pain    Bruises easily     Chronic pain disorder     Interstitial pain    Colon polyp     Dental bridge present     Depression     Eczema     GERD (gastroesophageal reflux disease)     Heart murmur     History of blood clots     per pt "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"    History of pneumonia     Hives     Hyperlipidemia     Hypertension     Infertility, female     Interstitial cystitis     Migraine     sees neurologist    Motion sickness     Palpitations     PONV (postoperative nausea and vomiting)     Psychiatric disorder     TIA (transient ischemic attack) 09/2022    per pt --"had MRI and 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"    Urinary incontinence     wears Pads    Wears glasses      Past Surgical History  Past Surgical History:   Procedure Laterality Date    COLONOSCOPY      DILATION AND CURETTAGE OF UTERUS      EGD      EXPLORATORY LAPAROTOMY      for infertility    HAND SURGERY      Hand excision of tendon cyst    ID LAPAROSCOPIC APPENDECTOMY N/A 05/03/2022    Procedure: APPENDECTOMY LAPAROSCOPIC;  Surgeon:  Sean Enriquez MD;  [facility];  Service: General    ID TEAEC W/PATCH GRF CAROTID VERTB [place] Left 1/31/2023    Procedure: EXPLORATION OF LEFT CAROTID ARTERY; ABORTED ENDARTERECTOMY ARTERY CAROTID;  Surgeon: Bishnu Garvin DO;  [facility];  Service: Vascular    SUPERIOR MESTENTERIC ARTERY STENT      WISDOM TOOTH EXTRACTION           02/01/23 1020   OT Last Visit   OT Visit Date 02/01/23   Note Type   Note type Evaluation   Pain Assessment   Pain Assessment Tool 0-10   Pain Score 8   Pain Location/Orientation Orientation: Left; Location: Face; Location: Neck   Restrictions/Precautions   Weight Bearing Precautions Per Order No   Other Precautions Fall Risk;Pain;Multiple lines   Home Living   Type of [address] One level   Bathroom Shower/Tub Tub/shower unit   Bathroom Toilet Standard   Bathroom Equipment   (no DME)   Home Equipment   (no DME)   Prior Function   Level of [place] Independent with ADLs; Independent with functional mobility; Independent with IADLS   Lives With [address]  (only support 90 miles away  local support from neighbors is limited)   IADLs Independent with driving; Independent with medication management; Independent with meal prep   Falls in the last 6 months 0   Vocational Retired   Comments Prior to admission, pt lives alone in a single level 1st floor apt with 0 [address]ARA  Apt has a tub shower, standard toilets  Owns no DME  I with ADLS, IADLs, mobility with no AD  Pt drives  Retired  Reports 0 falls  Only support lives 90 miles away  Local neighbors able to provide limited support  Lifestyle   Autonomy I with ADLS, IADLS, mobility     ADL   Where Assessed Chair   Eating Assistance 6  Modified independent   Grooming Assistance 6  Modified Independent   UB Bathing Assistance 5  Supervision/Setup    [address] 5  Supervision/Setup   LB Dressing Assistance 4  [address]  4  Minimal Assistance   Bed Mobility   Additional Comments greeted seated up in chair   Transfers   Sit to [address] to [facility] 5  Supervision   Additional Comments household distances   Additional items Rolling walker   Balance   Static Sitting [address] Static Standing Fair   Dynamic Standing Fair -   Ambulatory Fair -   Activity Tolerance   Activity Tolerance Patient limited by fatigue;Treatment limited secondary to medical complications (Comment)  (anxiety)   Medical Staff Made Aware PT Teresa   Nurse Made Aware RAMIN ECKERT Assessment   RUE Assessment WFL   LUE Assessment   LUE Assessment WFL  (noted functional proximal and distal weakness in LUE  MMT 3+/5 grossly )   Hand Function   Gross Motor Coordination Functional   Fine Motor Coordination Functional   Vision-Basic Assessment   Current Vision Wears glasses all the time   Psychosocial   Psychosocial (WDL) X   Patient Behaviors/Mood Anxious   Cognition   Overall Cognitive Status WFL   Arousal/Participation Cooperative   Attention Within functional limits   Orientation Level Oriented X4   Memory Within functional limits   Following Commands Follows all commands and directions without difficulty   Comments pleasant and cooperative  tearful  Assessment   Limitation Decreased ADL status; Decreased UE strength;Decreased Safe judgement during ADL;Decreased endurance;Decreased self-care trans   Prognosis Good   Assessment Jenny Frankel is a 57 y o  female with past medical history inclusive of TIA, carotid artery stenosis, paroxysmal A  Fib, hypertension, hyperlipidemia, anxiety, depression who is originally admitted to the vascular surgery service for elective endarterectomy of the left internal carotid for severe asymptomatic stenosis  Patient originally presented to PCP for left-sided facial droop in September 2022, found to have internal carotid stenosis on MRI, greater than 70% occlusion confirmed on ultrasound  Reports facial droop had improved after initial presentation, diagnosed with TIA, no prior history of stroke  She was taken to the OR 1/31/2023 for elective surgery, however planned endarterectomy was aborted due to difficult anatomy  Critical care consulted for medical management   Pt with active orders for OT and up with assistance  Prior to admission, pt lives alone in a single level 1st floor apt with 0 [address]  Apt has a tub shower, standard toilets  Owns no DME  I with ADLS, IADLs, mobility with no AD  Pt drives  Retired  Reports 0 falls  Only support lives 90 miles away  Local neighbors able to provide limited support  Upon evaluation, pt presenting below functional baseline with deficits noted in strength, activity tolerance, balance, safety awareness which is limiting pts functional ability to complete ADLs/ IADLs, functional transfers and functional mobility  Vitals: 166/77 post activity  Pt current level of function: bed mobility - unable to assess; transfers- supervision ; functional mobility-supervision with RW; UB dressing / bathing - supervision; LB dressing/ bathing- Olaf; toileting - Olaf  Pt would benefit from skilled OT 3-5 x/wk to maximize functional independence  OT DC recommendation: home with home health  Goals   Patient Goals to go  home  STG Time Frame 3-5   Short Term Goal #1 Pt will improve activity tolerance to G for min 30 min txment sessions for increase engagement in functional tasks   Short Term Goal #2 Pt will complete bed mobility at a Mod I level w/ G balance/safety demonstrated to decrease caregiver assistance required   Short Term Goal  Pt will complete LB dressing/self care w/ mod I using adaptive device and DME as needed   LTG Time Frame 10-14   Long Term Goal #1 Pt will complete toileting w/ mod I w/ G hygiene/thoroughness using DME as needed   Long Term Goal #2 Pt will improve functional transfers to Mod I on/off all surfaces using DME as needed w/ G balance/safety   Long Term Goal Pt will participate in simulated IADL management task to increase independence to Mod I w/ G safety and endurance   Plan   Treatment Interventions ADL retraining;Functional transfer training; Endurance training;UE strengthening/ROM; Patient/family training;Equipment evaluation/education; Neuromuscular reeducation; Compensatory technique education; Energy conservation; Activityengagement   Goal Expiration Date 02/15/23   OT Treatment Day 0   OT Frequency 3-5x/wk   Recommendation   OT Discharge Recommendation Home with home health rehabilitation   Additional Comments  The patient's raw score on the AM-PAC Daily Activity inpatient short form is 20, standardized score is 42 03, greater than 39 4  Patients at this level are likely to benefit from discharge to home  Please refer to the recommendation of the Occupational Therapist for safe discharge planning     AM-PAC Daily Activity Inpatient   Lower Body Dressing 3   Bathing 3   Toileting 3   Upper Body Dressing 3   Grooming 4   Eating 4   Daily Activity Raw Score 20   Daily Activity Standardized Score (Calc for Raw Score >=11) 42 03   AM-PAC Applied Cognition Inpatient   Following a Speech/Presentation 4   Understanding Ordinary Conversation 4   Taking Medications 4   Remembering Where Things Are Placed or Put Away 4   Remembering List of 4-5 Errands 4   Taking Care of Complicated Tasks 4   Applied Cognition Raw Score 24   Applied Cognition Standardized Score 62 21   Carlos Rodriguez, OT

## 2023-02-01 NOTE — PHYSICAL THERAPY NOTE
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"    History of pneumonia     Hives     Hyperlipidemia     Hypertension     Infertility, female     Interstitial cystitis     Migraine     sees neurologist    Motion sickness     Palpitations     PONV (postoperative nausea and vomiting)     Psychiatric disorder     TIA (transient ischemic attack) 09/2022    per pt --"had MRI and 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"    Urinary incontinence     wears Pads    Wears glasses          Past Surgical History:   Procedure Laterality Date    COLONOSCOPY      DILATION AND CURETTAGE OF UTERUS      EGD      EXPLORATORY LAPAROTOMY      for infertility    HAND SURGERY      Hand excision of tendon cyst    [place] LAPAROSCOPIC APPENDECTOMY N/A 05/03/2022    Procedure: APPENDECTOMY LAPAROSCOPIC;  Surgeon: Amador Rocha MD;  [facility];  Service: General    NV TEAEC W/PATCH GRF CAROTID VERTB [place] Left 1/31/2023    Procedure: EXPLORATION OF LEFT CAROTID ARTERY; ABORTED ENDARTERECTOMY ARTERY CAROTID;  Surgeon: Sharyle Roux, DO;  [facility];  Service: Vascular    SUPERIOR [address] ARTERY STENT      WISDOM TOOTH EXTRACTION        02/01/23 1025   PT Last Visit   PT Visit Date 02/01/23   Note Type   Note type Evaluation  (and treatment)   Pain Assessment   Pain Location/Orientation Orientation: Left; Location: Face; Location: Head;Location: Neck   Pain Rating: FLACC (Rest) - Face 1   Pain Rating: FLACC (Rest) - Legs 1   Pain Rating: FLACC (Rest) - Activity 1   Pain Rating: FLACC (Rest) - Cry 1   Pain Rating: FLACC (Rest) - Consolability 1   Score: FLACC (Rest) 5   Pain Rating: FLACC (Activity) - Face 1   Pain Rating: FLACC (Activity) - Legs 1   Pain Rating: FLACC (Activity) - Activity 1   Pain Rating: FLACC (Activity) - Cry 1   Pain Rating: FLACC (Activity) - Consolability 1   Score: FLACC (Activity) 5   Restrictions/Precautions   Weight Bearing Precautions Per Order No   Other Precautions Fall Risk;Pain;Multiple lines;Telemetry   Home Living   Type of Home Apartment   Home Layout One level  (0 [address])   Bathroom Shower/Tub Tub/shower unit   Bathroom Toilet Standard   Bathroom Equipment   (none)   Bathroom Accessibility Accessible   Home Equipment   (no DME)   Additional Comments resides alone in apt  Does have neighbors who have offered support, however closest suppor system 90 miles away reported  Was driving PTA  Prior Function   Level of [place] Independent with ADLs; Independent with functional mobility; Independent with IADLS   Lives With [address] [place] [address] From (S)  Neighbor  (closest support 90 miles away)   IADLs Independent with medication management; Independent with meal prep; Independent with driving   Falls in the last 6 months 0   Vocational Retired   Comments I PTA without AD use  Community ambulation noted  General   Additional Pertinent History Pt is 56 y/o female who presents for elective Carotid endarcterectomy, however aborted 2* difficuly anatomy  PT consulted for discharge planning  Family/Caregiver Present No   Cognition   Overall Cognitive Status WFL   Arousal/Participation Alert   Orientation Level Oriented X4   Following Commands Follows all commands and directions without difficulty   Comments Pleasant  Tearful about current situation  Subjective   Subjective Much pain at surgical site, headache    L sided symptoms persist    RUE Assessment   RUE Assessment WFL  (see OT notes for detailed MMT, observed functional reach and grasp )   LUE Assessment   LUE Assessment WFL  (see OT notes for detailed MMT, observed functional reach and grasp )   RLE Assessment   RLE Assessment WFL   Strength RLE   RLE Overall Strength 4-/5   LLE Assessment   LLE Assessment WFL   Strength LLE   LLE Overall Strength 4-/5   Light Touch   RLE Light Touch Grossly intact   LLE Light Touch Grossly intact   Bed Mobility   Additional Comments received sitting OOB in chair  Transfers   Sit to Stand 5  Supervision   Additional items Increased time required;Verbal cues;Armrests   Stand to Sit 5  Supervision   Additional items Increased time required;Armrests   Additional Comments Increased time to complete  Ambulation/Elevation   Gait pattern Improper Weight shift;Decreased foot clearance; Inconsistent dory; Short stride  (occasional lateral displacement but without LOB )   Gait Assistance 5  Supervision   Additional items Assist x 1   Assistive Device None   Distance Amb without AD 60'x1  BP p ambulation 166/77  RA O2 sat 94%   Balance   Static Sitting Fair +   Dynamic Sitting Fair   Static Standing Fair   Dynamic Standing Fair -   Ambulatory Fair -   Endurance Deficit   Endurance Deficit Yes   Endurance Deficit Description fatigue, weakness, pain  Activity Tolerance   Activity Tolerance Patient limited by fatigue;Patient limited by pain;Treatment limited secondary to medical complications (Comment)   Medical Staff Made Aware Geneva Pineda  OT-Vanessa: Pt seen for co-evaluation/treatment with skilled Occupational Therapist 2* clinically unstable/unpredictable presentation, medical complexity, fall risk, functional/physical limitations, impaired functional balance,  limited activity tolerance which is decline from PLOF and may impact overall functional mobility/mobility safety  Nurse Made Aware yes   Assessment   Prognosis Good   Problem List Decreased strength;Decreased endurance; Impaired balance;Decreased mobility;Obesity;Pain   Assessment Prem Bolanos is a 57 y o  female with past medical history inclusive of TIA, carotid artery stenosis, paroxysmal A  Fib, hypertension, hyperlipidemia, anxiety, depression who is originally admitted to the vascular surgery service for elective endarterectomy of the left internal carotid for severe asymptomatic stenosis  Patient originally presented to PCP for left-sided facial droop in September 2022, found to have internal carotid stenosis on MRI, greater than 70% occlusion confirmed on ultrasound  Reports facial droop had improved after initial presentation, diagnosed with TIA, no prior history of stroke  She was taken to the OR 1/31/2023 for elective surgery, however planned endarterectomy was aborted due to difficult anatomy  Critical care consulted for medical management  PT consulted  Prior to admission resides alone in apt with elevator access  Independent without AD use  Currently presents with impairments in strength, balance, activity tolerance, pain, functional mobility and locomotion  All mobility slowed but at S level  Gait slowed, decreased foot clearance and step length  Inconsistent dory with occasional lateral displacement  Will benefit from skilled PT in order to progress and optimize functional outcomes  The patient's AM-PAC Basic Mobility Inpatient Short Form Raw Score is 22  A Raw score of greater than 16 suggests the patient may benefit from discharge to home  Please also refer to the recommendation of the Physical Therapist for safe discharge planning  Anticipate d/c to home with HHPT  See treatment note for progress  Will benefit from RW and BSC  Barriers to Discharge Decreased caregiver support   Barriers to Discharge Comments lives alone   Goals   Patient Goals go home   STG Expiration Date 02/11/23   Short Term Goal #1 10 days: 1)    Pt will perform bed mobility with Portia demonstrating appropriate technique 100% of the time in order to improve function  2)  Perform all transfers with Portia demonstrating safe and appropriate technique 100% of the time in order to improve ability to negotiate safely in home environment  3) Amb with least restrictive AD > 150'x1 with mod I in order to demonstrate ability to negotiate in home environment  4)  Improve overall strength and balance 1/2 grade in order to optimize ability to perform functional tasks and reduce fall risk  5) Increase activity tolerance to 45 minutes in order to improve endurance to functional tasks  6) PT for ongoing patient and family/caregiver education, DME needs and d/c planning in order to promote highest level of function in least restrictive environment  PT Treatment Day 1   Plan   Treatment/Interventions Functional transfer training;LE strengthening/ROM; Therapeutic exercise; Endurance training;Patient/family training;Equipment eval/education; Bed mobility;Gait training; Compensatory technique education;Continued evaluation;Spoke to nursing;OT;Spoke to case management   PT Frequency 3-5x/wk   Recommendation   PT Discharge Recommendation Home with home health rehabilitation   [address] [place]; Other (Comment)  (RW, BSC)   Walker Package Recommended Wheeled walker   AM-PAC Basic Mobility Inpatient   Turning in Flat Bed Without Bedrails 4   Lying on Back to Sitting on Edge of Flat Bed Without Bedrails 3   Moving Bed to Chair 4   Standing Up From Chair Using Arms 4   Walk in Room 4   Climb 3-5 Stairs With Railing 3   Basic Mobility Inpatient Raw Score 22   Basic Mobility Standardized Score 47 4   Highest Level Of Mobility   JH-HLM Goal 7: Walk 25 feet or more   JH-HLM Achieved 7: Walk 25 feet or more   Additional Treatment Session   Start Time 1046   End Time 1101   Treatment Assessment Seen for progression of PT, trial of AD to improve gait performance  Transfers with S   Amb with RW support 70'x1  Improved pace, fluency and confidence with support of RW  Seated OOB in chair  Initially HR noted 130-152, however improved 69 ( nursing aware and monitoring on tele)  /67  Assisted with repositioning to improve comfort  Cont to rec HHPT at d/c with RW and BSC  Equipment Use RW   Additional Treatment Day 1   End of Consult   Patient Position at End of Consult Bedside chair; All needs within reach     Hx/personal factors: co-morbidities, dec caregiver support, home alone, mutliple lines, telemetry, pain, fall risk, and obesity  Examination: dec mobility, dec balance, dec endurance, dec amb, risk for falls, pain, assessed body system, balance, endurance, amb, D/C disposition & fall risk  Clinical: unpredictable (ongoing medical status, abnormal lab values, risk for falls, and pain mgt)  Complexity: high             Maggi Kendall, PT

## 2023-02-01 NOTE — PROGRESS NOTES
Progress Note - Vascular Surgery   [facility] 57 [facility] [id number]  Unit/Bed#:ICU 06 Encounter: [id number]      Assessment:    57 y o  with pAfib, CAD, PPM, CAMILLE, SMA Thrombosis c BES, obesity, HTN, Lap Appy presenting with Left Asymptomatic Carotid Stenosis  Discussed risk and benefits of intervention  1/31: Left Neck Exploration of Carotid Artery, Aborted Left Carotid Endarterectomy     Plan:  - Patient with evidence marginal mandibular nerve palsy, mild hypoglossal nerve palsy    - Tolerating PO Meds, minimal PO intake   - Diet: NPO c Meds  - Bedside speech eval  - Cont neuro, neck checks   - Small left neck hematoma; patient with large neck and grossly equal in size   - VTEppx: SQH  - Hold home eliquis (pAfib)  - Aspirin, Statin  - Ok for downgrade from ICU level of care     Subjective:    Pt s/e  Overnight, no acute issues  Pt awake, alert  Pt complains of left neck pain at incision site  Tolerating PO meds, limited PO intake  OOB  Voiding  Admits to nausea, mild emesis, and some difficulty swallowing  Denies sob, chest pain, f/c  No sensory change, numbness, or weakness of upper or lower extremities    I/Os:  I/O last 3 completed shifts: In: 4517 [I V :3875]  Out: 4000 [Urine:4000]  No intake/output data recorded  Review of Systems   Constitutional: Negative for chills and fever  HENT: Negative for ear pain and sore throat  Eyes: Negative for pain and visual disturbance  Respiratory: Negative for cough, choking, shortness of breath, wheezing and stridor  Cardiovascular: Negative for chest pain and palpitations  Gastrointestinal: Positive for nausea and vomiting  Negative for abdominal pain  Genitourinary: Negative for dysuria and hematuria  Musculoskeletal: Negative for arthralgias and back pain  Skin: Negative for color change and rash  Neurological: Negative for seizures and syncope  All other systems reviewed and are negative        Vitals:    02/01/23 0605   BP: 158/73   Pulse: 70   Resp: (!) 11   Temp:    SpO2: 92%        Physical Exam  Vitals and nursing note reviewed  Constitutional:       General: She is not in acute distress  Appearance: She is well-developed  She is obese  She is not ill-appearing, toxic-appearing or diaphoretic  HENT:      Head: Normocephalic and atraumatic  Eyes:      Conjunctiva/sclera: Conjunctivae normal    Neck:      Comments: Left neck ecchymosis, mild edema, soft, mild hematoma  Neck grossly similar in size to right side, large neck  Cardiovascular:      Rate and Rhythm: Normal rate and regular rhythm  Heart sounds: No murmur heard  Pulmonary:      Effort: Pulmonary effort is normal  No respiratory distress  Breath sounds: Normal breath sounds  Abdominal:      Palpations: Abdomen is soft  Tenderness: There is no abdominal tenderness  There is no guarding or rebound  Hernia: No hernia is present  Musculoskeletal:         General: No swelling  Cervical back: Neck supple  Skin:     General: Skin is warm and dry  Capillary Refill: Capillary refill takes less than 2 seconds  Neurological:      Mental Status: She is alert and oriented to person, place, and time  Mental status is at baseline  Comments: B/L UE and LE motor and sensory intact    Left lower lip with mild palsy  Mild left tongue deviation   Psychiatric:         Mood and Affect: Mood normal          Imaging:I have personally reviewed pertinent reports        Results Reviewed     None           Patient Active Problem List   Diagnosis   • Acute upper respiratory infection   • Allergic rhinitis   • Angina pectoris (HCC)   • Anxiety and depression   • Arthritis   • Asthma due to seasonal allergies   • Attention disturbance   • Benign essential hypertension   • Interstitial cystitis   • Chronic low back pain   • Familial hyperlipidemia   • Elevated antinuclear antibody (SON) level   • Elevated blood sugar   • Essential hypertension   • Female pelvic pain   • Hyperlipidemia   • Lipid disorder   • Migraines   • Obesity (BMI 30-39  9)   • Tick bite   • Tobacco abuse   • Venous insufficiency   • Anxiety   • Insomnia   • Snoring   • Cervicalgia   • Headache   • AMD (age-related macular degeneration), bilateral   • Allergic reaction   • Palpitations and chest pain     • Intertrigo   • Acute gastric ulcer without hemorrhage or perforation   • Tinea corporis   • Gastroesophageal reflux disease without esophagitis   • Allergic conjunctivitis of both eyes   • Intrinsic atopic dermatitis   • Angio-edema   • Vasomotor rhinitis   • Other chest pain   • Low TSH level   • Paroxysmal atrial fibrillation (HCC)   • Vaginal candidiasis   • WELLINGTON (dyspnea on exertion)   • Hair loss   • Patellar tendinitis of left knee   • Injury of toe on right foot   • Wheezing   • Lumbar radiculopathy   • Chronic fatigue   • Acute pyelonephritis   • CAMILLE (obstructive sleep apnea)   • Hypertensive heart disease with congestive heart failure ([facility])   • Shortness of breath   • Unspecified diastolic (congestive) heart failure ([facility])   • Vitamin B12 deficiency   • Left upper quadrant pain   • Diverticulitis   • 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[facility])   • Hypoxia   • Chronic bronchitis, unspecified chronic bronchitis type ([facility])   • Left arm pain   • Hematoma   • Continuous opioid dependence ([facility])   • COPD (chronic obstructive pulmonary disease) ([facility])   • Moderate persistent asthma without complication   • Urinary retention   • Peripheral vascular disease ([facility] )   • Appendix disease   • S/P appendectomy   • Pain of upper abdomen   • Left upper quadrant abdominal pain   • Recurrent UTI   • Stroke-like symptoms   • Localized swelling of left lower extremity   • Acute CVA (cerebrovascular accident) ([facility] )   • Carotid artery stenosis   • Stenosis of left carotid artery   • Asymptomatic stenosis of left carotid artery   • Premature atrial contractions   • Acute URI   • Lower abdominal pain   • Hepatic steatosis       Past Surgical History:   Procedure Laterality Date   • COLONOSCOPY     • DILATION AND CURETTAGE OF UTERUS     • EGD     • EXPLORATORY LAPAROTOMY      for infertility   • HAND SURGERY      Hand excision of tendon cyst   • CO LAPAROSCOPIC APPENDECTOMY N/A 2022    Procedure: APPENDECTOMY LAPAROSCOPIC;  Surgeon:  Rod Kessler MD;  Location: [facility];  Service: General   • SUPERIOR [address] ARTERY STENT     • WISDOM TOOTH EXTRACTION         Family History   Problem Relation Age of Onset   • Lung cancer Mother 61   • Brain cancer Mother 61   • Diabetes Father    • Depression Father    • Other Father         septic   • Allergies Sister    • Hashimoto's thyroiditis Sister    • Abdominal aortic aneurysm Sister    • Diabetes Sister    • No Known Problems Sister    • No Known Problems Maternal Grandmother    • No Known Problems Maternal Grandfather    • No Known Problems Paternal Grandmother    • No Known Problems Paternal Grandfather    • Hodgkin's lymphoma Brother    • No Known Problems Brother    • No Known Problems Maternal Aunt    • Diabetes Other        Social History     Socioeconomic History   • Marital status:      Spouse name: Not on file   • Number of children: 0   • Years of education: Not on file   • Highest education level: Not on file   Occupational History   • Occupation: unemployed   Tobacco Use   • Smoking status: Former     Packs/day: 0 50     Years: 10 00     Pack years: 5 00     Types: Cigarettes     Quit date:      Years since quittin 0   • Smokeless tobacco: Never   Vaping Use   • Vaping Use: Never used   Substance and Sexual Activity   • Alcohol use: Never   • Drug use: No   • Sexual activity: Not on file     Comment: defer   Other Topics Concern   • Not on file   Social History Narrative    Who lives in your home: Alone     What type of home do you live in: Apartment     Age of your home: 1952 built     How long have you been living there: 5 yrs     Type of heat: forced hot air     Type of fuel: electric     What type of jamin is in your bedroom: carpet jamin     Do you have the following in or near your home:    Air products: Humidifier in the bedroom/kitchen     Pests: none     Pets: none     Are pets allowed in bedroom: N/A     Open fields, wooded areas nearby: No     Basement: ifyq-jxzwiquwvnya-rrktj-no mold     Exposure to second hand smoke: No    Central air     Habits:    Caffeine: Hot tea 1 cup daily- ice tea 1 cup in the afternoon    Chocolate: Occasionally      Social Determinants of Health     Financial Resource Strain: Not on file   Food Insecurity: No Food Insecurity   • Worried About Running Out of Food in the Last Year: Never true   • Ran Out of Food in the Last Year: Never true   Transportation Needs: No Transportation Needs   • Lack of Transportation (Medical): No   • Lack of Transportation (Non-Medical): No   Physical Activity: Not on file   Stress: Not on file   Social Connections: Not on file   Intimate Partner Violence: Not on file   Housing Stability: Low Risk    • Unable to Pay for Housing in the Last Year: No   • Number of Places Lived in the Last Year: 1   • Unstable Housing in the Last Year: No       Allergies   Allergen Reactions   • Ace Inhibitors Angioedema and Anaphylaxis     Anaphylaxis   • Benicar [Olmesartan] Angioedema     See Allergy note from 9/11/2008  Swollen ankles/legs   • Hydralazine Other (See Comments)     Lip swelling  Flank pain   • Other Anaphylaxis and Other (See Comments)     Other reaction(s): angioadema  Other reaction(s):  Other (See Comments)  Preservatives- itching throat closes, hi  Other reaction(s): angioadema  E Z Cat - hives throat closes itching,  Preservatives- itching throat closes, hi  Artificial sweeteners   • Valsartan Angioedema     Lips, face swollen   • Sulfa Antibiotics Hives     stuffiness,itching,hives,throat closing--per pt "sometimes able to take depending on the brand and the filler or possibly preservatives in it"   • Ampicillin Hives     Depends on brand some preservatives can react  Has recently tolerated oral amoxicillin     • Motrin [Ibuprofen] Other (See Comments)     Per pt " told not to take due to A Fib"   • Wound Dressing Adhesive Other (See Comments)     Per pt Adhesive on EKG leads caused redness

## 2023-02-01 NOTE — CASE MANAGEMENT
Case Management Discharge Planning Note    Patient name Hector Bloom  Location ICU 06/ICU 06 MRN [id number]  : 1960 Date 2023       Current Admission Date: 2023  Current Admission Diagnosis:Stenosis of left carotid artery   Patient Active Problem List    Diagnosis Date Noted   • Partial facial nerve palsy 2023   • Lower abdominal pain 2022   • Hepatic steatosis 2022   • Acute URI 2022   • Premature atrial contractions 2022   • Asymptomatic stenosis of left carotid artery 10/05/2022   • Stenosis of left carotid artery 2022   • Carotid artery stenosis 2022   • Stroke-like symptoms 2022   • Localized swelling of left lower extremity 2022   • Acute CVA (cerebrovascular accident) ([facility] ) 2022   • Recurrent UTI 2022   • Pain of upper abdomen 2022   • Left upper quadrant abdominal pain 2022   • S/P appendectomy 2022   • Appendix disease 2022   • Urinary retention 2022   • Peripheral vascular disease ([facility] ) 2022   • Hematoma 11/15/2021   • Continuous opioid dependence ([address] ) 11/15/2021   • 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   • Hypoxia 2021   • Chronic bronchitis, unspecified chronic bronchitis type ([facility] ) 2021   • Left arm pain 2021   • COPD (chronic obstructive pulmonary disease) ([address] ) 2021   • Diverticulitis 2021   • Left upper quadrant pain 2021   • Moderate persistent asthma without complication    • Vitamin B12 deficiency 2021   • Unspecified diastolic (congestive) heart failure ([address] ) 2021   • Shortness of breath 2021   • Hypertensive heart disease with congestive heart failure ([address] ) 2020   • CAMILLE (obstructive sleep apnea)    • Acute pyelonephritis 2020   • Lumbar radiculopathy 10/01/2020   • Chronic fatigue 10/01/2020   • Wheezing 2020   • Injury of toe on right foot 2020   • Patellar tendinitis of left knee 07/31/2020   • Hair loss 07/09/2020   • WELLINGTON (dyspnea on exertion) 06/11/2020   • Vaginal candidiasis 04/09/2020   • Paroxysmal atrial fibrillation (HCC) 03/19/2020   • Low TSH level 02/06/2020   • Other chest pain 01/27/2020   • Vasomotor rhinitis 01/25/2020   • Allergic conjunctivitis of both eyes 01/22/2020   • Intrinsic atopic dermatitis 01/22/2020   • Angio-edema 01/22/2020   • Gastroesophageal reflux disease without esophagitis 08/12/2019   • Tinea corporis 07/15/2019   • Acute gastric ulcer without hemorrhage or perforation 06/12/2019   • Palpitations and chest pain   04/02/2019   • Intertrigo 04/02/2019   • Allergic reaction 12/03/2018   • Insomnia 11/16/2018   • Snoring 11/16/2018   • Cervicalgia 11/16/2018   • Headache 11/16/2018   • AMD (age-related macular degeneration), bilateral 11/16/2018   • Anxiety 10/19/2018   • Angina pectoris ([address] ) 11/15/2017   • Female pelvic pain 09/25/2017   • Arthritis 08/01/2017   • Venous insufficiency 08/01/2017   • Tick bite 07/14/2017   • Acute upper respiratory infection 02/24/2017   • Attention disturbance 01/26/2017   • Familial hyperlipidemia 12/02/2016   • Allergic rhinitis 10/14/2016   • Anxiety and depression 10/14/2016   • Asthma due to seasonal allergies 10/14/2016   • Benign essential hypertension 10/14/2016   • Interstitial cystitis 10/14/2016   • Chronic low back pain 10/14/2016   • Elevated antinuclear antibody (SON) level 10/14/2016   • Elevated blood sugar 10/14/2016   • Hyperlipidemia 10/14/2016   • Migraines 10/14/2016   • Essential hypertension 08/13/2015   • Lipid disorder 08/13/2015   • Tobacco abuse 08/13/2015   • Obesity (BMI 30-39 9) 03/24/2011      LOS (days): 1  Geometric Mean LOS (GMLOS) (days):   Days to GMLOS:     OBJECTIVE:  Risk of Unplanned Readmission Score: 24 92         Current admission status: Inpatient   Preferred Pharmacy:   [address], [place] - [address] [place] [address]  Phone: [phone] Fax: [phone]    [address] [facility] [address]  [address] [address]  Phone: [phone] Fax: [phone]    [facility] [address] [facility], [place] - [address] [place] [address]  Phone: [phone] Fax: [phone], [place] - [address] ,  [address] ,  [facility] [address]   Phone: [phone] Fax: [phone]    Primary Care Provider: Yonathan Manning DO    Primary Insurance: MEDICARE  Secondary Insurance: Felecia Emerson    DISCHARGE DETAILS:                                Requested [address]         Is the patient interested in [facility] at discharge?: Yes  [address] Gregory requested[de-identified] Nursing, Occupational Therapy, Physical [address] [place] [address] Name[de-identified] Other (TBD - referrals sent)  [address] [place] [address] Provider[de-identified] PCP  Home Health Services Needed[de-identified] Evaluate Functional Status and Safety, Gait/ADL Training, Post-Op Care and Assessment, Strengthening/Theraputic Exercises to Improve Function  Homebound Criteria Met[de-identified] Uses an Assist Device (i e  cane, walker, etc), Requires the Assistance of Another Person for Safe Ambulation or to Leave the Home  Supporting Clincal Findings[de-identified] Limited Endurance    DME Referral Provided  Referral made for DME?: Yes  DME referral completed for the following items[de-identified] Enma Sinks, Bedside Commode  DME Supplier Name[de-identified] [employer]    Other Referral/Resources/Interventions Provided:  Interventions: [facility]  Referral Comments: Search widenend in [address] for [facility] - to follow up with patient for choice once all agencies have responded           Treatment Team Recommendation: Home with [address]  Discharge Destination Plan[de-identified] Home with [address]

## 2023-02-02 RX ORDER — LORAZEPAM 1 MG/1
1 TABLET ORAL EVERY 6 HOURS PRN
Status: DISCONTINUED | OUTPATIENT
Start: 2023-02-02 | End: 2023-02-03 | Stop reason: HOSPADM

## 2023-02-02 RX ADMIN — DILTIAZEM HYDROCHLORIDE 120 MG: 120 CAPSULE, EXTENDED RELEASE ORAL at 19:52

## 2023-02-02 RX ADMIN — LORAZEPAM 1 MG: 1 TABLET ORAL at 06:47

## 2023-02-02 RX ADMIN — DOCUSATE SODIUM AND SENNOSIDES 1 TABLET: 8.6; 5 TABLET, FILM COATED ORAL at 22:14

## 2023-02-02 RX ADMIN — HEPARIN SODIUM 5000 UNITS: 5000 INJECTION INTRAVENOUS; SUBCUTANEOUS at 06:47

## 2023-02-02 RX ADMIN — HEPARIN SODIUM 5000 UNITS: 5000 INJECTION INTRAVENOUS; SUBCUTANEOUS at 22:14

## 2023-02-02 RX ADMIN — HYDROMORPHONE HYDROCHLORIDE 0.5 MG: 1 INJECTION, SOLUTION INTRAMUSCULAR; INTRAVENOUS; SUBCUTANEOUS at 09:16

## 2023-02-02 RX ADMIN — LORAZEPAM 1 MG: 1 TABLET ORAL at 19:52

## 2023-02-02 RX ADMIN — HYDROXYZINE HYDROCHLORIDE 50 MG: 25 TABLET ORAL at 22:14

## 2023-02-02 RX ADMIN — ACETAMINOPHEN 325MG 975 MG: 325 TABLET ORAL at 17:14

## 2023-02-02 RX ADMIN — SPIRONOLACTONE 25 MG: 25 TABLET ORAL at 09:14

## 2023-02-02 RX ADMIN — FLUTICASONE FUROATE AND VILANTEROL TRIFENATATE 1 PUFF: 200; 25 POWDER RESPIRATORY (INHALATION) at 10:09

## 2023-02-02 RX ADMIN — DILTIAZEM HYDROCHLORIDE 120 MG: 120 CAPSULE, EXTENDED RELEASE ORAL at 09:13

## 2023-02-02 RX ADMIN — OXYCODONE 5 MG: 5 TABLET ORAL at 12:48

## 2023-02-02 RX ADMIN — METOPROLOL SUCCINATE 100 MG: 50 TABLET, EXTENDED RELEASE ORAL at 17:15

## 2023-02-02 RX ADMIN — ACETAMINOPHEN 325MG 975 MG: 325 TABLET ORAL at 12:48

## 2023-02-02 RX ADMIN — SPIRONOLACTONE 25 MG: 25 TABLET ORAL at 17:15

## 2023-02-02 RX ADMIN — OXYCODONE HYDROCHLORIDE 10 MG: 10 TABLET ORAL at 06:46

## 2023-02-02 RX ADMIN — ACETAMINOPHEN 325MG 975 MG: 325 TABLET ORAL at 06:46

## 2023-02-02 RX ADMIN — OXYCODONE HYDROCHLORIDE AND ACETAMINOPHEN 1000 MG: 500 TABLET ORAL at 09:14

## 2023-02-02 RX ADMIN — ACETAMINOPHEN 325MG 975 MG: 325 TABLET ORAL at 23:48

## 2023-02-02 RX ADMIN — LORAZEPAM 1 MG: 1 TABLET ORAL at 12:51

## 2023-02-02 RX ADMIN — ASPIRIN 81 MG: 81 TABLET, CHEWABLE ORAL at 09:14

## 2023-02-02 RX ADMIN — FAMOTIDINE 20 MG: 20 TABLET, FILM COATED ORAL at 22:14

## 2023-02-02 RX ADMIN — FUROSEMIDE 40 MG: 40 TABLET ORAL at 09:14

## 2023-02-02 RX ADMIN — METOPROLOL SUCCINATE 100 MG: 50 TABLET, EXTENDED RELEASE ORAL at 09:14

## 2023-02-02 RX ADMIN — OXYCODONE 5 MG: 5 TABLET ORAL at 19:52

## 2023-02-02 RX ADMIN — PHENAZOPYRIDINE 200 MG: 200 TABLET ORAL at 06:46

## 2023-02-02 RX ADMIN — HEPARIN SODIUM 5000 UNITS: 5000 INJECTION INTRAVENOUS; SUBCUTANEOUS at 14:37

## 2023-02-02 NOTE — UTILIZATION REVIEW
NOTIFICATION OF INPATIENT ADMISSION   AUTHORIZATION REQUEST   SERVICING FACILITY:   [address] [place] [address] [facility]  Tax ID: [id number]  NPI: [id number] ATTENDING PROVIDER:  Attending Name and NPI#: Rickie Gao [[id number]]  Address: [address] [place] [address]  Phone: [phone]     ADMISSION INFORMATION:  Place of Service: [place] [address]  Place of Service Code: 21  Inpatient Admission Date/Time: 1/31/23 12:13 PM  Discharge Date/Time: No discharge date for patient encounter  Admitting Diagnosis Code/Description:  Stenosis of left carotid artery [I65 22]     UTILIZATION REVIEW CONTACT:  Abhishek Mcwilliams Utilization   Network Utilization Review Department  Phone: [phone]  Fax: [phone]  Email: [email or web address]  Contact for approvals/pending authorizations, clinical reviews, and discharge  PHYSICIAN ADVISORY SERVICES:  Medical Necessity Denial & Xbnf-me-Ysgk Review  Phone: [phone]  Fax: [phone]  Email: [email or web address]

## 2023-02-02 NOTE — PROGRESS NOTES
[facility]  Progress Note Jazmin Seen 1960, 57 y o  female MRN: [id number]  Unit/Bed#: E5 -01 Encounter: [id number]  Primary Care Provider: Sherren Renshaw, DO   Date and time admitted to hospital: 1/31/2023  7:43 AM    * Stenosis of left carotid artery  Assessment & Plan  57year old female former smoker w/HTN, HLD, pAfib, CAD, s/p PPM, CAMILLE, hx of SMA Thrombosis s/p BES @ [facility], obesity, hx of TIA, and asymptomatic L ICA stenosis, s/p aborted L CEA     1/31/22: Aborted L CEA 2/2 anatomy and high risk of hypoglossal nerve injury     - Patient remains stable  Incision is clean, dry and soft  No firmness  Tender to palpation  - Had hypoglossal and marginal mandibular nerve palsy post operatively, hypoglossal nerve palsy has resolved while she continues to have some L sided facial droop consistent with marginal mandibular nerve palsy  Appears to be overall improving  - Patient is very anxious at baseline and is concerned regarding significant pain and facial numbness  Recommend observation for one more day to ensure that patient will be stable for d/c  - PT/OT, speech eval, appreciated input  - PRN pain and anxiety medications   - Continue ASA  - Outpatient follow up with Dr Daisy Villa to discuss possible TCAR for carotid disease  - Will d/w Dr Carolina Martinez      Subjective: Patient tearful on exam this morning  Reports severe anxiety  Remains hemodynamically stable  Incision is clean and dry  Surrounding ecchymosis however stable  Reports facial numbness which is unchanged  Continues with slight L sided facial droop  Vitals:  /72   Pulse 66   Temp 97 8 °F (36 6 °C) (Oral)   Resp 15   Ht 5' 4" (1 626 m)   Wt 96 kg (211 lb 10 3 oz)   LMP  (LMP Unknown)   SpO2 92%   BMI 36 33 kg/m²     I/Os:  I/O last 3 completed shifts: In: 660 4 [I V :660 4]  Out: 4250 [Urine:4250]  No intake/output data recorded      Lab Results and Cultures:   CBC with diff: Lab Results   Component Value Date    WBC 8 79 02/01/2023    HGB 12 9 02/01/2023    HCT 40 2 02/01/2023    MCV 92 02/01/2023     02/01/2023    MCH 29 5 02/01/2023    MCHC 32 1 02/01/2023    RDW 12 5 02/01/2023    MPV 9 1 02/01/2023    NRBC 0 09/21/2022   ,   BMP/CMP:  Lab Results   Component Value Date    SODIUM 139 02/01/2023    SODIUM 138 07/27/2021    K 3 8 02/01/2023    K 4 0 07/27/2021     02/01/2023     07/27/2021    CO2 29 02/01/2023    CO2 27 07/27/2021    BUN 10 02/01/2023    BUN 16 07/27/2021    CREATININE 0 81 02/01/2023    CREATININE 0 83 11/13/2017    CALCIUM 9 4 02/01/2023    CALCIUM 9 9 07/27/2021    AST 20 09/21/2022    AST 44 (H) 07/27/2021    ALT 28 09/21/2022    ALT 44 (H) 07/27/2021    ALKPHOS 115 09/21/2022    ALKPHOS 119 07/27/2021    PROT 7 0 11/13/2017    BILITOT 0 3 11/13/2017    EGFR 76 02/01/2023   ,   Lipid Panel:   Lab Results   Component Value Date    CHOL 413 (H) 11/13/2017   ,   Coags:   Lab Results   Component Value Date    PTT 29 02/01/2023    INR 1 00 02/01/2023   ,     Blood Culture: No results found for: BLOODCX,   Urinalysis: Lab Results   Component Value Date    COLORU Yellow 12/26/2022    CLARITYU Cloudy 12/26/2022    SPECGRAV 1 010 12/26/2022    PHUR 6 5 12/26/2022    LEUKOCYTESUR Moderate (A) 12/26/2022    NITRITE Positive (A) 12/26/2022    GLUCOSEU Negative 12/26/2022    KETONESU Negative 12/26/2022    BILIRUBINUR Negative 12/26/2022    BLOODU Trace-lysed (A) 12/26/2022   ,   Urine Culture:   Lab Results   Component Value Date    URINECX No Growth <1000 cfu/mL 01/23/2023   ,   Wound Culure: No results found for: WOUNDCULT    Medications:  Current Facility-Administered Medications   Medication Dose Route Frequency   • acetaminophen (TYLENOL) tablet 975 mg  975 mg Oral Q6H [facility]   • ascorbic acid (VITAMIN C) tablet 1,000 mg  1,000 mg Oral Daily   • aspirin chewable tablet 81 mg  81 mg Oral Daily   • diltiazem (CARDIZEM SR) 12 hr capsule 120 mg  120 mg Oral Q12H [facility]   • famotidine (PEPCID) tablet 20 mg  20 mg Oral HS   • fluticasone-vilanterol 200-25 mcg/actuation 1 puff  1 puff Inhalation Daily   • furosemide (LASIX) tablet 40 mg  40 mg Oral Daily   • heparin (porcine) subcutaneous injection 5,000 Units  5,000 Units Subcutaneous Q8H [facility]   • HYDROmorphone (DILAUDID) injection 0 5 mg  0 5 mg Intravenous Q2H PRN   • hydrOXYzine HCL (ATARAX) tablet 50 mg  50 mg Oral HS   • LORazepam (ATIVAN) tablet 1 mg  1 mg Oral Q8H PRN   • metoprolol succinate (TOPROL-XL) 24 hr tablet 100 mg  100 mg Oral BID   • naloxone (NARCAN) 0 04 mg/mL syringe 0 04 mg  0 04 mg Intravenous Q1MIN PRN   • ondansetron (ZOFRAN-ODT) dispersible tablet 4 mg  4 mg Oral Q6H PRN   • oxyCODONE (ROXICODONE) immediate release tablet 10 mg  10 mg Oral Q4H PRN   • oxyCODONE (ROXICODONE) IR tablet 5 mg  5 mg Oral Q4H PRN   • phenazopyridine (PYRIDIUM) tablet 200 mg  200 mg Oral TID PRN   • senna-docusate sodium (SENOKOT S) 8 6-50 mg per tablet 1 tablet  1 tablet Oral HS   • spironolactone (ALDACTONE) tablet 25 mg  25 mg Oral BID       Imaging:  Reviewed    Physical Exam:    General: Alert and oriented, in no acute distress however tearful   CV: Regular rate   Neck: Incision is clean, dry and intact with some ecchymosis, however area is soft without hematoma   Respiratory: Normal effort   Abdominal: Soft, non distended   Extremities: Grossly normal   Neurologic: Equal strength bilaterally   No signs/symptoms of TIA/CVA      Darryl Frankel, PA-C  2/2/2023

## 2023-02-02 NOTE — PLAN OF CARE
Problem: MOBILITY - ADULT  Goal: Maintain or return to baseline ADL function  Description: INTERVENTIONS:  -  Assess patient's ability to carry out ADLs; assess patient's baseline for ADL function and identify physical deficits which impact ability to perform ADLs (bathing, care of mouth/teeth, toileting, grooming, dressing, etc )  - Assess/evaluate cause of self-care deficits   - Assess range of motion  - Assess patient's mobility; develop plan if impaired  - Assess patient's need for assistive devices and provide as appropriate  - Encourage maximum independence but intervene and supervise when necessary  - Involve family in performance of ADLs  - Assess for home care needs following discharge   - Consider OT consult to assist with ADL evaluation and planning for discharge  - Provide patient education as appropriate  Outcome: Progressing  Goal: Maintains/Returns to pre admission functional level  Description: INTERVENTIONS:  - Perform BMAT or MOVE assessment daily    - Set and communicate daily mobility goal to care team and patient/family/caregiver  - Collaborate with rehabilitation services on mobility goals if consulted  - Perform Range of Motion 3 times a day  - Reposition patient every 2 hours    - Dangle patient 3 times a day  - Stand patient 3 times a day  - Ambulate patient 3 times a day  - Out of bed to chair 3 times a day   - Out of bed for meals 3 times a day  - Out of bed for toileting  - Record patient progress and toleration of activity level   Outcome: Progressing     Problem: PAIN - ADULT  Goal: Verbalizes/displays adequate comfort level or baseline comfort level  Description: Interventions:  - Encourage patient to monitor pain and request assistance  - Assess pain using appropriate pain scale  - Administer analgesics based on type and severity of pain and evaluate response  - Implement non-pharmacological measures as appropriate and evaluate response  - Consider cultural and social influences on pain and pain management  - Notify physician/advanced practitioner if interventions unsuccessful or patient reports new pain  Outcome: Progressing     Problem: INFECTION - ADULT  Goal: Absence or prevention of progression during hospitalization  Description: INTERVENTIONS:  - Assess and monitor for signs and symptoms of infection  - Monitor lab/diagnostic results  - Monitor all insertion sites, i e  indwelling lines, tubes, and drains  - Monitor endotracheal if appropriate and nasal secretions for changes in amount and color  - [place] appropriate cooling/warming therapies per order  - Administer medications as ordered  - Instruct and encourage patient and family to use good hand hygiene technique  - Identify and instruct in appropriate isolation precautions for identified infection/condition  Outcome: Progressing  Goal: Absence of fever/infection during neutropenic period  Description: INTERVENTIONS:  - Monitor WBC    Outcome: Progressing     Problem: SAFETY ADULT  Goal: Maintain or return to baseline ADL function  Description: INTERVENTIONS:  -  Assess patient's ability to carry out ADLs; assess patient's baseline for ADL function and identify physical deficits which impact ability to perform ADLs (bathing, care of mouth/teeth, toileting, grooming, dressing, etc )  - Assess/evaluate cause of self-care deficits   - Assess range of motion  - Assess patient's mobility; develop plan if impaired  - Assess patient's need for assistive devices and provide as appropriate  - Encourage maximum independence but intervene and supervise when necessary  - Involve family in performance of ADLs  - Assess for home care needs following discharge   - Consider OT consult to assist with ADL evaluation and planning for discharge  - Provide patient education as appropriate  Outcome: Progressing  Goal: Maintains/Returns to pre admission functional level  Description: INTERVENTIONS:  - Perform BMAT or MOVE assessment daily    - Set and communicate daily mobility goal to care team and patient/family/caregiver  - Collaborate with rehabilitation services on mobility goals if consulted  - Perform Range of Motion 3 times a day  - Reposition patient every 2 hours    - Dangle patient 3 times a day  - Stand patient 3 times a day  - Ambulate patient 3 times a day  - Out of bed to chair 3 times a day   - Out of bed for meals 3 times a day  - Out of bed for toileting  - Record patient progress and toleration of activity level   Outcome: Progressing  Goal: Patient will remain free of falls  Description: INTERVENTIONS:  - Educate patient/family on patient safety including physical limitations  - Instruct patient to call for assistance with activity   - Consult OT/PT to assist with strengthening/mobility   - Keep Call bell within reach  - Keep bed low and locked with side rails adjusted as appropriate  - Keep care items and personal belongings within reach  - Initiate and maintain comfort rounds  - Make Fall Risk Sign visible to staff  - Offer Toileting every 2 Hours, in advance of need  - Initiate/Maintain bed alarm  - Obtain necessary fall risk management equipment:   - Apply yellow socks and bracelet for high fall risk patients  - Consider moving patient to room near nurses station  Outcome: Progressing     Problem: DISCHARGE PLANNING  Goal: Discharge to home or other facility with appropriate resources  Description: INTERVENTIONS:  - Identify barriers to discharge w/patient and caregiver  - Arrange for needed discharge resources and transportation as appropriate  - Identify discharge learning needs (meds, wound care, etc )  - Arrange for interpretive services to assist at discharge as needed  - Refer to Case Management Department for coordinating discharge planning if the patient needs post-hospital services based on physician/advanced practitioner order or complex needs related to functional status, cognitive ability, or social support system  Outcome: Progressing     Problem: Knowledge Deficit  Goal: Patient/family/caregiver demonstrates understanding of disease process, treatment plan, medications, and discharge instructions  Description: Complete learning assessment and assess knowledge base    Interventions:  - Provide teaching at level of understanding  - Provide teaching via preferred learning methods  Outcome: Progressing

## 2023-02-02 NOTE — SPEECH THERAPY NOTE
Speech Language/Pathology    Speech/Language Pathology Progress Note    Patient Name: Tammie Schwartz  Today's Date: 2/2/2023     Subjective:  "I just didn't have a good day yesterday" She does report a desire for upgrade but admits to mild persistent lingual numbness on the L  Denies any additional numbness  RN denies any difficulties with sp/swallowing  Objective:  Patient seen upright at EOB with trial of regular solids ( chicken fingers, french fries) with thin liquids  She admits to mild discomfort with harder/dry solids but denies any odynophagia  She reports her swallow is back to 90%, she continues to report mild difficulty with speech and voice changes but are both improving and appear functional during conversational speech  Continues with reduced labial ROM on the L  Admits labial sensation has resolved since this morning  Mastication was adequate with the materials administered today  Bolus formation and transfer were functional with nosignificant oral residue noted  No overt s/s reduced oral control  No coughing, throat clearing, change in vocal quality or respiratory status noted today  Education was provided on strategies for improved ease of the swallow as well as prognosis and recommendations if sxs don't resolve s/p d/c  Reciprocal comprehension was verbally expressed and all questions answered  Assessment:  Patients oropharyngeal swallow function appears GWFL at this time  Minimal dysarthria and dysphonia present which will likely resolve  Patient instructed to f/u with ST upon d/c if these do not  Plan/Recommendations:  Consider upgrade to regular/thin   meds as tolerated ( patient reports easier to do 1 at a time right now)  Aspiration precautions and compensatory swallowing strategies: upright posture, slow rate of feeding, small bites/sips, alternating bites and sips and use of added moisteners    No further ST indicated at this time      PRISCILA Sanders , CCC-SLP  Speech Language Pathologist   Available via [address] [place] [address] [id number]  [place] [id number]

## 2023-02-02 NOTE — ASSESSMENT & PLAN NOTE
57year old female former smoker w/HTN, HLD, pAfib, CAD, s/p PPM, CAMILLE, hx of SMA Thrombosis s/p BES @ [facility], obesity, hx of TIA, and asymptomatic L ICA stenosis, s/p aborted L CEA     1/31/22: Aborted L CEA 2/2 anatomy and high risk of hypoglossal nerve injury     - Patient remains stable  Incision is clean, dry and soft  No firmness  Tender to palpation  - Had hypoglossal and marginal mandibular nerve palsy post operatively, hypoglossal nerve palsy has resolved while she continues to have some L sided facial droop consistent with marginal mandibular nerve palsy  Appears to be overall improving  - Patient is very anxious at baseline and is concerned regarding significant pain and facial numbness   Recommend observation for one more day to ensure that patient will be stable for d/c  - PT/OT, speech eval, appreciated input  - PRN pain and anxiety medications   - Continue ASA  - Outpatient follow up with Dr Latonia Krishna to discuss possible TCAR for carotid disease  - Will d/w Dr John Gan

## 2023-02-03 ENCOUNTER — HOME HEALTH ADMISSION (OUTPATIENT)
Dept: HOME HEALTH SERVICES | Facility: HOME HEALTHCARE | Age: 63
End: 2023-02-03

## 2023-02-03 VITALS
HEART RATE: 72 BPM | HEIGHT: 64 IN | DIASTOLIC BLOOD PRESSURE: 68 MMHG | TEMPERATURE: 98.4 F | RESPIRATION RATE: 19 BRPM | BODY MASS INDEX: 36.13 KG/M2 | WEIGHT: 211.64 LBS | SYSTOLIC BLOOD PRESSURE: 140 MMHG | OXYGEN SATURATION: 92 %

## 2023-02-03 RX ORDER — OXYCODONE HYDROCHLORIDE 5 MG/1
5 TABLET ORAL EVERY 4 HOURS PRN
Qty: 20 TABLET | Refills: 0 | Status: SHIPPED | OUTPATIENT
Start: 2023-02-03 | End: 2023-02-10

## 2023-02-03 RX ADMIN — FLUTICASONE FUROATE AND VILANTEROL TRIFENATATE 1 PUFF: 200; 25 POWDER RESPIRATORY (INHALATION) at 08:15

## 2023-02-03 RX ADMIN — SPIRONOLACTONE 25 MG: 25 TABLET ORAL at 08:14

## 2023-02-03 RX ADMIN — OXYCODONE HYDROCHLORIDE 10 MG: 10 TABLET ORAL at 06:59

## 2023-02-03 RX ADMIN — DILTIAZEM HYDROCHLORIDE 120 MG: 120 CAPSULE, EXTENDED RELEASE ORAL at 08:14

## 2023-02-03 RX ADMIN — FUROSEMIDE 40 MG: 40 TABLET ORAL at 08:14

## 2023-02-03 RX ADMIN — ASPIRIN 81 MG: 81 TABLET, CHEWABLE ORAL at 08:15

## 2023-02-03 RX ADMIN — ONDANSETRON 4 MG: 4 TABLET, ORALLY DISINTEGRATING ORAL at 12:46

## 2023-02-03 RX ADMIN — LORAZEPAM 1 MG: 1 TABLET ORAL at 01:59

## 2023-02-03 RX ADMIN — OXYCODONE HYDROCHLORIDE AND ACETAMINOPHEN 1000 MG: 500 TABLET ORAL at 08:15

## 2023-02-03 RX ADMIN — LORAZEPAM 1 MG: 1 TABLET ORAL at 14:24

## 2023-02-03 RX ADMIN — METOPROLOL SUCCINATE 100 MG: 50 TABLET, EXTENDED RELEASE ORAL at 08:15

## 2023-02-03 RX ADMIN — APIXABAN 5 MG: 5 TABLET, FILM COATED ORAL at 11:11

## 2023-02-03 RX ADMIN — HEPARIN SODIUM 5000 UNITS: 5000 INJECTION INTRAVENOUS; SUBCUTANEOUS at 05:20

## 2023-02-03 RX ADMIN — OXYCODONE 5 MG: 5 TABLET ORAL at 01:59

## 2023-02-03 RX ADMIN — PHENAZOPYRIDINE 200 MG: 200 TABLET ORAL at 06:54

## 2023-02-03 RX ADMIN — OXYCODONE 5 MG: 5 TABLET ORAL at 12:42

## 2023-02-03 RX ADMIN — ACETAMINOPHEN 325MG 975 MG: 325 TABLET ORAL at 12:42

## 2023-02-03 RX ADMIN — LORAZEPAM 1 MG: 1 TABLET ORAL at 08:15

## 2023-02-03 RX ADMIN — ACETAMINOPHEN 325MG 975 MG: 325 TABLET ORAL at 06:49

## 2023-02-03 NOTE — PROGRESS NOTES
[address] [place] [address]  Progress Note Nikko Parent 1960, 57 y o  female MRN: [id number]  Unit/Bed#: E5 -01 Encounter: [id number]  Primary Care Provider: Joni Vallejo DO   Date and time admitted to hospital: 1/31/2023  7:43 AM    * Stenosis of left carotid artery  Assessment & Plan  57year old female former smoker w/HTN, HLD, pAfib, CAD, s/p PPM, CAMLILE, hx of SMA Thrombosis s/p BES @ [facility], obesity, hx of TIA, and asymptomatic L ICA stenosis, s/p aborted L CEA     1/31/22: Aborted L CEA 2/2 anatomy and high risk of hypoglossal nerve injury     - Patient remains stable  Incision is clean, dry and soft  No firmness  Tender to palpation  - Had hypoglossal and marginal mandibular nerve palsy post operatively, hypoglossal nerve palsy has resolved while she continues to have some L sided facial droop consistent with marginal mandibular nerve palsy, which continues to improve  Reports some residual L sided facial numbness   - Patient is very anxious at baseline and is concerned regarding significant pain and facial numbness  This appears to be improving   - PT/OT, speech eval, appreciated input  - PRN pain and anxiety medications   - Continue ASA, resume home Eliquis  - Stable for d/c from a vascular standpoint  Will plan for discharge this afternoon as patient's family friend can pick her up after 3p  - Outpatient follow up with Dr Phill Coats to discuss possible TCAR for carotid disease  - Will d/w Dr Luaryn Andujar: Patient seen and examined  States that she is doing well  Reports some residual numbness of the L side of her face  Facial asymmetry has improved  Remains motor/sensory intact  Tolerating a diet  Vitals:  /68   Pulse 72   Temp 98 4 °F (36 9 °C)   Resp 19   Ht 5' 4" (1 626 m)   Wt 96 kg (211 lb 10 3 oz)   LMP  (LMP Unknown)   SpO2 92%   BMI 36 33 kg/m²     I/Os:  No intake/output data recorded  No intake/output data recorded      Lab Results and Cultures:   CBC with diff: Lab Results   Component Value Date    WBC 8 79 02/01/2023    HGB 12 9 02/01/2023    HCT 40 2 02/01/2023    MCV 92 02/01/2023     02/01/2023    MCH 29 5 02/01/2023    MCHC 32 1 02/01/2023    RDW 12 5 02/01/2023    MPV 9 1 02/01/2023    NRBC 0 09/21/2022   ,   BMP/CMP:  Lab Results   Component Value Date    SODIUM 139 02/01/2023    SODIUM 138 07/27/2021    K 3 8 02/01/2023    K 4 0 07/27/2021     02/01/2023     07/27/2021    CO2 29 02/01/2023    CO2 27 07/27/2021    BUN 10 02/01/2023    BUN 16 07/27/2021    CREATININE 0 81 02/01/2023    CREATININE 0 83 11/13/2017    CALCIUM 9 4 02/01/2023    CALCIUM 9 9 07/27/2021    AST 20 09/21/2022    AST 44 (H) 07/27/2021    ALT 28 09/21/2022    ALT 44 (H) 07/27/2021    ALKPHOS 115 09/21/2022    ALKPHOS 119 07/27/2021    PROT 7 0 11/13/2017    BILITOT 0 3 11/13/2017    EGFR 76 02/01/2023   ,   Lipid Panel:   Lab Results   Component Value Date    CHOL 413 (H) 11/13/2017   ,   Coags:   Lab Results   Component Value Date    PTT 29 02/01/2023    INR 1 00 02/01/2023   ,     Blood Culture: No results found for: BLOODCX,   Urinalysis: Lab Results   Component Value Date    COLORU Yellow 12/26/2022    CLARITYU Cloudy 12/26/2022    SPECGRAV 1 010 12/26/2022    PHUR 6 5 12/26/2022    LEUKOCYTESUR Moderate (A) 12/26/2022    NITRITE Positive (A) 12/26/2022    GLUCOSEU Negative 12/26/2022    KETONESU Negative 12/26/2022    BILIRUBINUR Negative 12/26/2022    BLOODU Trace-lysed (A) 12/26/2022   ,   Urine Culture:   Lab Results   Component Value Date    URINECX No Growth <1000 cfu/mL 01/23/2023   ,   Wound Culure: No results found for: WOUNDCULT    Medications:  Current Facility-Administered Medications   Medication Dose Route Frequency   • acetaminophen (TYLENOL) tablet 975 mg  975 mg Oral Q6H [address]   • apixaban (ELIQUIS) tablet 5 mg  5 mg Oral BID   • ascorbic acid (VITAMIN C) tablet 1,000 mg  1,000 mg Oral Daily   • aspirin chewable tablet 81 mg  81 mg Oral Daily   • diltiazem (CARDIZEM SR) 12 hr capsule 120 mg  120 mg Oral Q12H BONIFACIO   • famotidine (PEPCID) tablet 20 mg  20 mg Oral HS   • fluticasone-vilanterol 200-25 mcg/actuation 1 puff  1 puff Inhalation Daily   • furosemide (LASIX) tablet 40 mg  40 mg Oral Daily   • HYDROmorphone (DILAUDID) injection 0 5 mg  0 5 mg Intravenous Q2H PRN   • hydrOXYzine HCL (ATARAX) tablet 50 mg  50 mg Oral HS   • LORazepam (ATIVAN) tablet 1 mg  1 mg Oral Q6H PRN   • metoprolol succinate (TOPROL-XL) 24 hr tablet 100 mg  100 mg Oral BID   • naloxone (NARCAN) 0 04 mg/mL syringe 0 04 mg  0 04 mg Intravenous Q1MIN PRN   • ondansetron (ZOFRAN-ODT) dispersible tablet 4 mg  4 mg Oral Q6H PRN   • oxyCODONE (ROXICODONE) immediate release tablet 10 mg  10 mg Oral Q4H PRN   • oxyCODONE (ROXICODONE) IR tablet 5 mg  5 mg Oral Q4H PRN   • phenazopyridine (PYRIDIUM) tablet 200 mg  200 mg Oral TID PRN   • senna-docusate sodium (SENOKOT S) 8 6-50 mg per tablet 1 tablet  1 tablet Oral HS   • spironolactone (ALDACTONE) tablet 25 mg  25 mg Oral BID       Imaging:  Reviewed  Physical Exam:    General: Alert and oriented  In no acute distress  Neck: Incision is soft, ecchymosis present  No hematoma  Incision is well approximated   CV: Regular rate   Respiratory: Normal effort   Abdominal: Soft, non-tender   Extremities: Grossly normal   Neurologic: Equal strength bilaterally, no motor/sensory deficit         Hudson Hernandez PA-C  2/3/2023

## 2023-02-03 NOTE — NURSING NOTE
Discharge instructions reviewed with pt  She is aware of prescriptions to be picked up and follow-up appointments  Pt questioned the dose timing of her ativan, and she was advised to follow-up with PCP if she desires an increased frequency, as she is being sent home with the same regimen her PCP prescribed  Pt has no further questions or concerns, she has a friend picking her up

## 2023-02-03 NOTE — PLAN OF CARE
Problem: OCCUPATIONAL THERAPY ADULT  Goal: Performs self-care activities at highest level of function for planned discharge setting  See evaluation for individualized goals  Description: Treatment Interventions: ADL retraining, Functional transfer training, Endurance training, UE strengthening/ROM, Patient/family training, Equipment evaluation/education, Neuromuscular reeducation, Compensatory technique education, Energy conservation, Activityengagement          See flowsheet documentation for full assessment, interventions and recommendations  Outcome: Progressing  Note: Limitation: Decreased ADL status, Decreased UE strength, Decreased Safe judgement during ADL, Decreased endurance, Decreased self-care trans  Prognosis: Good  Assessment: Pt seen for skilled OT tx this date  Tx focused on improving strength, activity tolerance and balance, safety awareness to increase independence with self care tasks  Pt tolerated session well  Pt was limited by pain and anxiety during session  Pt greeted in supine and agreeable to session  Pt tearful at beginning of session, provided therapeutic comfort and support  Pt edu on safe transition into the home, pain  and anxiety management  Pt performed UB dressing/ bathing with Portia standing at sink, LB dressing / bathing supervision standing at sink , Toileting Portia,  bed mobility Portia, transfers supervision, mobility with no device supervision - edu on use of RW for fatigue/ weakness  Pt demonstrated fair standing balance during functional tasks, no LOB noted  Pt required min verbal cuing during session to safely complete tasks  Vitals: O2 on RA 95%, /72 HR 80  Current OT DC recommendations for pt is home with home health rehab services       OT Discharge Recommendation: Home with home health rehabilitation

## 2023-02-03 NOTE — PLAN OF CARE
Problem: PHYSICAL THERAPY ADULT  Goal: Performs mobility at highest level of function for planned discharge setting  See evaluation for individualized goals  Description: Treatment/Interventions: Functional transfer training, LE strengthening/ROM, Therapeutic exercise, Endurance training, Patient/family training, Equipment eval/education, Bed mobility, Gait training, Compensatory technique education, Continued evaluation, Spoke to nursing, OT, Spoke to case management  Equipment Recommended: Pam Nagy, Other (Comment) (RW, BSC)       See flowsheet documentation for full assessment, interventions and recommendations  Outcome: Completed  Note: Prognosis: Good  Problem List: Decreased strength, Decreased endurance, Impaired balance, Decreased mobility, Obesity, Pain  Assessment: Seen for therapy progression  Much improved from previous  Portia for bed mobility and transfers  S for ambulation without AD, Portia with RW  Able to increase distances  Improved tolerance  Negoitates stairs without difficulty  Continue restorative ambulation  HHPT at d/c  The patient's AM-PAC Basic Mobility Inpatient Short Form Raw Score is 23   A Raw score of greater than 16 suggests the patient may benefit from discharge to home  Please also refer to the recommendation of the Physical Therapist for safe discharge planning  Will discontinue IPPT  Barriers to Discharge: Decreased caregiver support  Barriers to Discharge Comments: alone  PT Discharge Recommendation: Home with home health rehabilitation    See flowsheet documentation for full assessment

## 2023-02-03 NOTE — OCCUPATIONAL THERAPY NOTE
Occupational Therapy Progress Note     Patient Name: Gauri Irwin  Today's Date: 2/3/2023  Problem List  Principal Problem:    Stenosis of left carotid artery  Active Problems:    Anxiety and depression    Benign essential hypertension    Paroxysmal atrial fibrillation (HCC)    Partial facial nerve palsy        02/03/23 0939   OT Last Visit   OT Visit Date 02/03/23   Note Type   Note Type Treatment   Pain Assessment   Pain Assessment Tool 0-10   Pain Score 8   Pain Location/Orientation Orientation: Left; Location: Face; Location: Neck   Restrictions/Precautions   Weight Bearing Precautions Per Order No   Other Precautions Pain;Telemetry;Multiple lines   ADL   Where Assessed Standing at sink   Eating Assistance 7  Independent   Grooming Assistance 6  Modified Independent   UB Bathing Assistance 6  Modified Independent   LB Bathing Assistance 5  Supervision/Setup   UB Dressing Assistance 6  Modified independent   LB Dressing Assistance 5  Supervision/Setup   Toileting Assistance  6  Modified independent   Functional Standing Tolerance   Time 2-3 min   Activity static/ dynamic standing balance during ADL activity  Comments fair balance  Bed Mobility   Supine to Sit 6  Modified independent   Additional Comments pt left seated up in chair following session     Transfers   Sit to Stand 5  Supervision   Additional items Increased time required   Stand to Sit 5  Supervision   Additional items Increased time required   Stand pivot 5  Supervision   Toilet transfer 6  Modified independent   Functional Mobility   Functional Mobility 5  Supervision   Additional Comments household distances, no AD   Toilet Transfers   Toilet Transfer Type To and from   Toilet Transfer to Standard toilet   Toilet Transfer Technique Ambulating   Toilet Transfers Modified independence   Cognition   Overall Cognitive Status WFL   Arousal/Participation Cooperative   Attention Within functional limits   Orientation Level Oriented X4   Memory Within functional limits   Following Commands Follows all commands and directions without difficulty   Comments pleasant, cooperative  tearful at times but motivated  Activity Tolerance   Activity Tolerance Patient tolerated treatment well;Patient limited by pain   Medical Staff Made Aware RAMIN Sweeney   Assessment   Assessment Pt seen for skilled OT tx this date  Tx focused on improving strength, activity tolerance and balance, safety awareness to increase independence with self care tasks  Pt tolerated session well  Pt was limited by pain and anxiety during session  Pt greeted in supine and agreeable to session  Pt tearful at beginning of session, provided therapeutic comfort and support  Pt edu on safe transition into the home, pain  and anxiety management  Pt performed UB dressing/ bathing with Portia standing at sink, LB dressing / bathing supervision standing at sink , Toileting Portia,  bed mobility Portia, transfers supervision, mobility with no device supervision - edu on use of RW for fatigue/ weakness  Pt demonstrated fair standing balance during functional tasks, no LOB noted  Pt required min verbal cuing during session to safely complete tasks  Vitals: O2 on RA 95%, /72 HR 80  Current OT DC recommendations for pt is home with home health rehab services  Plan   Treatment Interventions ADL retraining;Functional transfer training;UE strengthening/ROM; Endurance training;Patient/family training;Equipment evaluation/education; Neuromuscular reeducation; Compensatory technique education; Energy conservation; Activityengagement   Goal Expiration Date 02/15/23   OT Treatment Day 1   OT Frequency 3-5x/wk   Recommendation   OT Discharge Recommendation Home with home health rehabilitation   Additional Comments  The patient's raw score on the AM-PAC Daily Activity Inpatient Short Form is 22  A raw score of greater than or equal to 19 suggests the patient may benefit from discharge to home   Please refer to the recommendation of the Occupational Therapist for safe discharge planning  AM-PAC Daily Activity Inpatient   Lower Body Dressing 3   Bathing 3   Toileting 4   Upper Body Dressing 4   Grooming 4   Eating 4   Daily Activity Raw Score 22   Daily Activity Standardized Score (Calc for Raw Score >=11) 47  1   AM-PAC Applied Cognition Inpatient   Following a Speech/Presentation 4   Understanding Ordinary Conversation 4   Taking Medications 4   Remembering Where Things Are Placed or Put Away 4   Remembering List of 4-5 Errands 4   Taking Care of Complicated Tasks 4   Applied Cognition Raw Score 24   Applied Cognition Standardized Score 62 21   Carlos Rodriguez, OT

## 2023-02-03 NOTE — PLAN OF CARE
Problem: MOBILITY - ADULT  Goal: Maintain or return to baseline ADL function  Description: INTERVENTIONS:  -  Assess patient's ability to carry out ADLs; assess patient's baseline for ADL function and identify physical deficits which impact ability to perform ADLs (bathing, care of mouth/teeth, toileting, grooming, dressing, etc )  - Assess/evaluate cause of self-care deficits   - Assess range of motion  - Assess patient's mobility; develop plan if impaired  - Assess patient's need for assistive devices and provide as appropriate  - Encourage maximum independence but intervene and supervise when necessary  - Involve family in performance of ADLs  - Assess for home care needs following discharge   - Consider OT consult to assist with ADL evaluation and planning for discharge  - Provide patient education as appropriate  Outcome: Progressing  Goal: Maintains/Returns to pre admission functional level  Description: INTERVENTIONS:  - Perform BMAT or MOVE assessment daily    - Set and communicate daily mobility goal to care team and patient/family/caregiver  - Collaborate with rehabilitation services on mobility goals if consulted  - Perform Range of Motion 3 times a day  - Reposition patient every 2 hours    - Dangle patient 3 times a day  - Stand patient 3 times a day  - Ambulate patient 3 times a day  - Out of bed to chair 3 times a day   - Out of bed for meals 3 times a day  - Out of bed for toileting  - Record patient progress and toleration of activity level   Outcome: Progressing     Problem: PAIN - ADULT  Goal: Verbalizes/displays adequate comfort level or baseline comfort level  Description: Interventions:  - Encourage patient to monitor pain and request assistance  - Assess pain using appropriate pain scale  - Administer analgesics based on type and severity of pain and evaluate response  - Implement non-pharmacological measures as appropriate and evaluate response  - Consider cultural and social influences on pain and pain management  - Notify physician/advanced practitioner if interventions unsuccessful or patient reports new pain  Outcome: Progressing     Problem: INFECTION - ADULT  Goal: Absence or prevention of progression during hospitalization  Description: INTERVENTIONS:  - Assess and monitor for signs and symptoms of infection  - Monitor lab/diagnostic results  - Monitor all insertion sites, i e  indwelling lines, tubes, and drains  - Monitor endotracheal if appropriate and nasal secretions for changes in amount and color  - [place] appropriate cooling/warming therapies per order  - Administer medications as ordered  - Instruct and encourage patient and family to use good hand hygiene technique  - Identify and instruct in appropriate isolation precautions for identified infection/condition  Outcome: Progressing  Goal: Absence of fever/infection during neutropenic period  Description: INTERVENTIONS:  - Monitor WBC    Outcome: Progressing     Problem: SAFETY ADULT  Goal: Maintain or return to baseline ADL function  Description: INTERVENTIONS:  -  Assess patient's ability to carry out ADLs; assess patient's baseline for ADL function and identify physical deficits which impact ability to perform ADLs (bathing, care of mouth/teeth, toileting, grooming, dressing, etc )  - Assess/evaluate cause of self-care deficits   - Assess range of motion  - Assess patient's mobility; develop plan if impaired  - Assess patient's need for assistive devices and provide as appropriate  - Encourage maximum independence but intervene and supervise when necessary  - Involve family in performance of ADLs  - Assess for home care needs following discharge   - Consider OT consult to assist with ADL evaluation and planning for discharge  - Provide patient education as appropriate  Outcome: Progressing  Goal: Maintains/Returns to pre admission functional level  Description: INTERVENTIONS:  - Perform BMAT or MOVE assessment daily    - Set and communicate daily mobility goal to care team and patient/family/caregiver  - Collaborate with rehabilitation services on mobility goals if consulted  - Perform Range of Motion 3 times a day  - Reposition patient every 2 hours    - Dangle patient 3 times a day  - Stand patient 3 times a day  - Ambulate patient 3 times a day  - Out of bed to chair 3 times a day   - Out of bed for meals 3 times a day  - Out of bed for toileting  - Record patient progress and toleration of activity level   Outcome: Progressing  Goal: Patient will remain free of falls  Description: INTERVENTIONS:  - Educate patient/family on patient safety including physical limitations  - Instruct patient to call for assistance with activity   - Consult OT/PT to assist with strengthening/mobility   - Keep Call bell within reach  - Keep bed low and locked with side rails adjusted as appropriate  - Keep care items and personal belongings within reach  - Initiate and maintain comfort rounds  - Make Fall Risk Sign visible to staff  - Offer Toileting every 2 Hours, in advance of need  - Initiate/Maintain bed alarm  - Obtain necessary fall risk management equipment:   - Apply yellow socks and bracelet for high fall risk patients  - Consider moving patient to room near nurses station  Outcome: Progressing     Problem: DISCHARGE PLANNING  Goal: Discharge to home or other facility with appropriate resources  Description: INTERVENTIONS:  - Identify barriers to discharge w/patient and caregiver  - Arrange for needed discharge resources and transportation as appropriate  - Identify discharge learning needs (meds, wound care, etc )  - Arrange for interpretive services to assist at discharge as needed  - Refer to Case Management Department for coordinating discharge planning if the patient needs post-hospital services based on physician/advanced practitioner order or complex needs related to functional status, cognitive ability, or social support system  Outcome: Progressing     Problem: Knowledge Deficit  Goal: Patient/family/caregiver demonstrates understanding of disease process, treatment plan, medications, and discharge instructions  Description: Complete learning assessment and assess knowledge base    Interventions:  - Provide teaching at level of understanding  - Provide teaching via preferred learning methods  Outcome: Progressing

## 2023-02-03 NOTE — ASSESSMENT & PLAN NOTE
57year old female former smoker w/HTN, HLD, pAfib, CAD, s/p PPM, CAMILLE, hx of SMA Thrombosis s/p BES @ [facility], obesity, hx of TIA, and asymptomatic L ICA stenosis, s/p aborted L CEA     1/31/22: Aborted L CEA 2/2 anatomy and high risk of hypoglossal nerve injury     - Patient remains stable  Incision is clean, dry and soft  No firmness  Tender to palpation  - Had hypoglossal and marginal mandibular nerve palsy post operatively, hypoglossal nerve palsy has resolved while she continues to have some L sided facial droop consistent with marginal mandibular nerve palsy, which continues to improve  Reports some residual L sided facial numbness   - Patient is very anxious at baseline and is concerned regarding significant pain and facial numbness  This appears to be improving   - PT/OT, speech eval, appreciated input  - PRN pain and anxiety medications   - Continue ASA, resume home Eliquis  - Stable for d/c from a vascular standpoint   Will plan for discharge this afternoon as patient's family friend can pick her up after 3p  - Outpatient follow up with Dr Daisy Villa to discuss possible TCAR for carotid disease  - Will d/w Dr Carolina Martinez

## 2023-02-03 NOTE — DISCHARGE INSTR - AVS FIRST PAGE
DISCHARGE INSTRUCTIONS  NECK SURGERY    ACTIVITY:  Limit your physical activity to walking for the first week and then increase your activity as tolerated  Walking up steps and normal activities may be resumed as you feel ready  Avoid heavy lifting (do not lift more than 15 pounds) for the first two weeks after surgery  You should not drive a car for at least one week following discharge from the hospital and you are off all narcotic pain medication  You may ride in a car  DIET:  Resume your normal diet  Good nutrition is important for healing of your incision  You can expect to have a sore throat and trouble swallowing after surgery which should improve quickly  If you feel like you are choking, please call your doctor  RECURRENT SYMPTOMS: If you develop any new/worsening numbness, weakness, vision changes, drooping of the face, or difficulty with speech after discharge, CALL 911 or go to the nearest Emergency department immediately  INCISION SITE:  You may have surgical glue at your incision site  There are stitches present under the skin which will absorb on their own  The glue is used to cover the incision, assist in closure, and prevent contamination  This adhesive will darken and peel away on its own within one to two weeks  Do not pick at it  If you have a bandage, please remove this on the second day after surgery  You should shower daily  Wash incision daily with soap and water, but do not rub or scrub the incision; rinse thoroughly and pat dry  Do not bathe in a tub or swim for the first 4 weeks following surgery or if you have any open wounds  It is normal to have some bruising, swelling or discoloration around the incision  IF increasing redness, pain, bleeding or a bulge develops, call our office immediately  If you notice any active bleeding at the site, apply pressure to the site and call our office ([phone]) or [phone]      FOLLOW UP APPOINTMENTS:  Making and keeping follow up appointments and ultrasound tests are important to your recovery  If you have difficulty making it to or keeping your follow up appointments, call the office  If you have increased pain, fever >101 5, increased drainage, redness or a bad smell at your surgery site, new coldness/numbness of your arm or leg, please call us immediately and GO directly to the ER  PLEASE CALL THE OFFICE IF YOU HAVE ANY QUESTIONS  [phone]  [phone]  [address] [place] [address] , [address], [place], [address]  [address], [address], [place], [address] [facility]  [address], [facility], [address] [facility]  [address] [facility], [address] [place] [facility],[address], [facility], [address]    [address], [address], Roxanne [address]  [address], [address] [facility], [address], [address] [facility], [address] [facility]  [address] [facility], [address] [facility], One [facility],[address], Sixto Goodwin 6  201 [facility], [facility], [address] [place] [address]

## 2023-02-03 NOTE — PHYSICAL THERAPY NOTE
PHYSICAL THERAPY NOTE          Patient Name: Paul Hayes  Today's Date: 2/3/2023  10:35-10:59       02/03/23 1035   PT Last Visit   PT Visit Date 02/03/23   Note Type   Note Type Treatment   Pain Assessment   Pain Rating: FLACC (Rest) - Face 0   Pain Rating: FLACC (Rest) - Legs 0   Pain Rating: FLACC (Rest) - Activity 0   Pain Rating: FLACC (Rest) - Cry 0   Pain Rating: FLACC (Rest) - Consolability 0   Score: FLACC (Rest) 0   Pain Rating: FLACC (Activity) - Face 0   Pain Rating: FLACC (Activity) - Legs 0   Pain Rating: FLACC (Activity) - Activity 0   Pain Rating: FLACC (Activity) - Cry 0   Pain Rating: FLACC (Activity) - Consolability 0   Score: FLACC (Activity) 0   Restrictions/Precautions   Weight Bearing Precautions Per Order No   Other Precautions Fall Risk;Telemetry  ([place])   General   Chart Reviewed Yes   Response to Previous Treatment Patient with no complaints from previous session  Family/Caregiver Present No   Cognition   Overall Cognitive Status WFL   Subjective   Subjective Doing better overall  Bed Mobility   Supine to Sit 6  Modified independent   Transfers   Sit to Stand 6  Modified independent   Stand to Sit 6  Modified independent   Additional Comments fluent transitions   Ambulation/Elevation   Gait pattern Decreased foot clearance; Inconsistent dory   Gait Assistance 5  Supervision  (to Portia with RW)   Additional items Verbal cues   Assistive Device Rolling walker;None   Distance 20 ft without AD, additional 150'x2 with RW support and Portia   Stair Management Assistance 5  Supervision   Additional items Verbal cues   Stair Management Technique One rail R;Two rails; Alternating pattern; Step to pattern; Foreward   Number of Stairs 5  (5 steps x 2)   Balance   Static Sitting Normal   Dynamic Sitting Good   Static Standing Good   Dynamic Standing Fair   Ambulatory Fair   Endurance Deficit   Endurance Deficit Yes Endurance Deficit Description fatigue   Activity Tolerance   Activity Tolerance Patient tolerated treatment well;Patient limited by fatigue   Medical Staff Made Aware NurseZahra   Nurse Made Aware yes   Assessment   Prognosis Good   Problem List Decreased strength;Decreased endurance; Impaired balance;Decreased mobility;Obesity;Pain   Assessment Seen for therapy progression  Much improved from previous  Portia for bed mobility and transfers  S for ambulation without AD, Portia with RW  Able to increase distances  Improved tolerance  Negoitates stairs without difficulty  Continue restorative ambulation  HHPT at d/c  The patient's [facility] Basic Mobility Inpatient Short Form Raw Score is 23   A Raw score of greater than 16 suggests the patient may benefit from discharge to home  Please also refer to the recommendation of the Physical Therapist for safe discharge planning  Will discontinue IPPT   Barriers to Discharge Decreased caregiver support   Barriers to Discharge Comments alone   Goals   Patient Goals go home   STG Expiration Date 02/11/23   Short Term Goal #1 Seen for therapy progression  Much improved from previous  Portia for bed mobility and transfers  S for ambulation without AD, Portia with RW  Able to increase distances  Improved tolerance  Negoitates stairs without difficulty  Continue restorative ambulation  HHPT at d/c  [facility] Raw Score is 23   A Raw score of greater than 16 suggests the patient may benefit from discharge to home  Please also refer to the recommendation of the Physical Therapist for safe discharge planning  Will discontinue IPPT  Short Term Goal #2 stair goal as stairs to basement laundry and front entry reported:  Perform stairs wtih rail and S in order to negotiate in home environment  PT Treatment Day 2   Plan   Treatment/Interventions Functional transfer training;Elevations; Therapeutic exercise; Endurance training;Patient/family training;Equipment eval/education; Bed mobility;Gait training; Compensatory technique education;Continued evaluation;Spoke to nursing   Progress Discontinue PT   PT Frequency Other (Comment)  (can continue restorative ambulation )   Recommendation   PT Discharge Recommendation Home with home health rehabilitation   Equipment Recommended Walker; Other (Comment)  (BSC, RW)   Walker Package Recommended Wheeled walker   AM-PAC Basic Mobility Inpatient   Turning in Flat Bed Without Bedrails 4   Lying on Back to Sitting on Edge of Flat Bed Without Bedrails 4   Moving Bed to Chair 4   Standing Up From Chair Using Arms 4   Walk in Room 4   Climb 3-5 Stairs With Railing 3   Basic Mobility Inpatient Raw Score 23   Basic Mobility Standardized Score 50 88   Highest Level Of Mobility   JH-HLM Goal 7: Walk 25 feet or more   JH-HLM Achieved 8: Walk 250 feet ot more   Education   Education Provided Mobility training;Assistive device   Patient Demonstrates acceptance/verbal understanding   End of Consult   Patient Position at End of Consult Seated edge of bed; All needs within reach   Laura Childress, CHRISTOPHER

## 2023-02-03 NOTE — PLAN OF CARE
Problem: MOBILITY - ADULT  Goal: Maintain or return to baseline ADL function  Description: INTERVENTIONS:  -  Assess patient's ability to carry out ADLs; assess patient's baseline for ADL function and identify physical deficits which impact ability to perform ADLs (bathing, care of mouth/teeth, toileting, grooming, dressing, etc )  - Assess/evaluate cause of self-care deficits   - Assess range of motion  - Assess patient's mobility; develop plan if impaired  - Assess patient's need for assistive devices and provide as appropriate  - Encourage maximum independence but intervene and supervise when necessary  - Involve family in performance of ADLs  - Assess for home care needs following discharge   - Consider OT consult to assist with ADL evaluation and planning for discharge  - Provide patient education as appropriate  Outcome: Progressing  Goal: Maintains/Returns to pre admission functional level  Description: INTERVENTIONS:  - Perform BMAT or MOVE assessment daily    - Set and communicate daily mobility goal to care team and patient/family/caregiver  - Collaborate with rehabilitation services on mobility goals if consulted  - Perform Range of Motion  - Reposition patient every 2 hours    - Dangle patient   - Stand patient   - Ambulate patient   - Out of bed to chair    - Out of bed for meals   - Out of bed for toileting  - Record patient progress and toleration of activity level   Outcome: Progressing     Problem: PAIN - ADULT  Goal: Verbalizes/displays adequate comfort level or baseline comfort level  Description: Interventions:  - Encourage patient to monitor pain and request assistance  - Assess pain using appropriate pain scale  - Administer analgesics based on type and severity of pain and evaluate response  - Implement non-pharmacological measures as appropriate and evaluate response  - Consider cultural and social influences on pain and pain management  - Notify physician/advanced practitioner if interventions unsuccessful or patient reports new pain  Outcome: Progressing     Problem: INFECTION - ADULT  Goal: Absence or prevention of progression during hospitalization  Description: INTERVENTIONS:  - Assess and monitor for signs and symptoms of infection  - Monitor lab/diagnostic results  - Monitor all insertion sites, i e  indwelling lines, tubes, and drains  - Monitor endotracheal if appropriate and nasal secretions for changes in amount and color  - [place] appropriate cooling/warming therapies per order  - Administer medications as ordered  - Instruct and encourage patient and family to use good hand hygiene technique  - Identify and instruct in appropriate isolation precautions for identified infection/condition  Outcome: Progressing  Goal: Absence of fever/infection during neutropenic period  Description: INTERVENTIONS:  - Monitor WBC    Outcome: Progressing     Problem: SAFETY ADULT  Goal: Maintain or return to baseline ADL function  Description: INTERVENTIONS:  -  Assess patient's ability to carry out ADLs; assess patient's baseline for ADL function and identify physical deficits which impact ability to perform ADLs (bathing, care of mouth/teeth, toileting, grooming, dressing, etc )  - Assess/evaluate cause of self-care deficits   - Assess range of motion  - Assess patient's mobility; develop plan if impaired  - Assess patient's need for assistive devices and provide as appropriate  - Encourage maximum independence but intervene and supervise when necessary  - Involve family in performance of ADLs  - Assess for home care needs following discharge   - Consider OT consult to assist with ADL evaluation and planning for discharge  - Provide patient education as appropriate  Outcome: Progressing  Goal: Maintains/Returns to pre admission functional level  Description: INTERVENTIONS:  - Perform BMAT or MOVE assessment daily    - Set and communicate daily mobility goal to care team and patient/family/caregiver  - Collaborate with rehabilitation services on mobility goals if consulted  - Perform Range of Motion   - Reposition patient every 2 hours    - Dangle patient  - Stand patient   - Ambulate patient   - Out of bed to chair   - Out of bed for meals   - Out of bed for toileting  - Record patient progress and toleration of activity level   Outcome: Progressing  Goal: Patient will remain free of falls  Description: INTERVENTIONS:  - Educate patient/family on patient safety including physical limitations  - Instruct patient to call for assistance with activity   - Consult OT/PT to assist with strengthening/mobility   - Keep Call bell within reach  - Keep bed low and locked with side rails adjusted as appropriate  - Keep care items and personal belongings within reach  - Initiate and maintain comfort rounds  - Make Fall Risk Sign visible to staff  - Offer Toileting every 2 Hours, in advance of need  - Initiate/Maintain bed alarm  - Obtain necessary fall risk management equipment:   - Apply yellow socks and bracelet for high fall risk patients  - Consider moving patient to room near nurses station  Outcome: Progressing     Problem: DISCHARGE PLANNING  Goal: Discharge to home or other facility with appropriate resources  Description: INTERVENTIONS:  - Identify barriers to discharge w/patient and caregiver  - Arrange for needed discharge resources and transportation as appropriate  - Identify discharge learning needs (meds, wound care, etc )  - Arrange for interpretive services to assist at discharge as needed  - Refer to Case Management Department for coordinating discharge planning if the patient needs post-hospital services based on physician/advanced practitioner order or complex needs related to functional status, cognitive ability, or social support system  Outcome: Progressing     Problem: Knowledge Deficit  Goal: Patient/family/caregiver demonstrates understanding of disease process, treatment plan, medications, and discharge instructions  Description: Complete learning assessment and assess knowledge base    Interventions:  - Provide teaching at level of understanding  - Provide teaching via preferred learning methods  Outcome: Progressing

## 2023-02-05 ENCOUNTER — HOME CARE VISIT (OUTPATIENT)
Dept: HOME HEALTH SERVICES | Facility: HOME HEALTHCARE | Age: 63
End: 2023-02-05

## 2023-02-05 VITALS
DIASTOLIC BLOOD PRESSURE: 72 MMHG | OXYGEN SATURATION: 96 % | RESPIRATION RATE: 20 BRPM | SYSTOLIC BLOOD PRESSURE: 146 MMHG | HEART RATE: 62 BPM | TEMPERATURE: 98 F

## 2023-02-05 LAB
DME PARACHUTE DELIVERY DATE ACTUAL: NORMAL
DME PARACHUTE DELIVERY DATE EXPECTED: NORMAL
DME PARACHUTE DELIVERY DATE REQUESTED: NORMAL
DME PARACHUTE ITEM DESCRIPTION: NORMAL
DME PARACHUTE ORDER STATUS: NORMAL
DME PARACHUTE SUPPLIER NAME: NORMAL
DME PARACHUTE SUPPLIER PHONE: NORMAL

## 2023-02-06 ENCOUNTER — HOME CARE VISIT (OUTPATIENT)
Dept: HOME HEALTH SERVICES | Facility: HOME HEALTHCARE | Age: 63
End: 2023-02-06

## 2023-02-06 ENCOUNTER — TRANSITIONAL CARE MANAGEMENT (OUTPATIENT)
Dept: FAMILY MEDICINE CLINIC | Facility: CLINIC | Age: 63
End: 2023-02-06

## 2023-02-06 DIAGNOSIS — F41.9 ANXIETY: ICD-10-CM

## 2023-02-06 NOTE — TELEPHONE ENCOUNTER
PT scheduled a TCM appt 2/16/23  She is asking if you can increase her Ativan to q6 hrs rather than every 8 hours until she sees you on the 16th  She was taking it q6 hrs in the hospital and it worked better  I did put a refill in the system for approval if this is okay

## 2023-02-07 ENCOUNTER — HOME CARE VISIT (OUTPATIENT)
Dept: HOME HEALTH SERVICES | Facility: HOME HEALTHCARE | Age: 63
End: 2023-02-07

## 2023-02-07 ENCOUNTER — TELEPHONE (OUTPATIENT)
Dept: VASCULAR SURGERY | Facility: CLINIC | Age: 63
End: 2023-02-07

## 2023-02-07 DIAGNOSIS — I65.23 BILATERAL CAROTID ARTERY STENOSIS: Primary | ICD-10-CM

## 2023-02-07 DIAGNOSIS — F41.9 ANXIETY: ICD-10-CM

## 2023-02-07 DIAGNOSIS — I65.22 STENOSIS OF LEFT CAROTID ARTERY: ICD-10-CM

## 2023-02-07 RX ORDER — LORAZEPAM 1 MG/1
1 TABLET ORAL EVERY 6 HOURS PRN
Qty: 90 TABLET | Refills: 0 | Status: SHIPPED | OUTPATIENT
Start: 2023-02-07

## 2023-02-07 RX ORDER — OXYCODONE HYDROCHLORIDE 5 MG/1
5 TABLET ORAL EVERY 6 HOURS PRN
Qty: 12 TABLET | Refills: 0 | Status: SHIPPED | OUTPATIENT
Start: 2023-02-07 | End: 2023-02-10

## 2023-02-07 RX ORDER — OXYCODONE HYDROCHLORIDE 5 MG/1
5 TABLET ORAL EVERY 4 HOURS PRN
Qty: 20 TABLET | Refills: 0 | OUTPATIENT
Start: 2023-02-07 | End: 2023-02-17

## 2023-02-07 RX ORDER — LORAZEPAM 1 MG/1
1 TABLET ORAL EVERY 6 HOURS PRN
Qty: 90 TABLET | Refills: 0 | OUTPATIENT
Start: 2023-02-07

## 2023-02-07 NOTE — TELEPHONE ENCOUNTER
PDMP reviewed  Script appropriate  Script for oxycodone 5 mg IR q6h was sent to preferred pharmacy  Please advise patient to use prescription narcotics sparingly and utilize tylenol when able  If she develops any new or worsening symptoms she should notify the office  Patients numbness is expected post operatively due to high exposure and nerve manipulation  This should resolve with time

## 2023-02-07 NOTE — TELEPHONE ENCOUNTER
Vascular Nurse Navigator Post Op Phone Call    Post-Discharge phone call attempted to assess patient recovery after vascular surgery I left a message on answering machine  Will attempt to contact at later date  Pt's chart was reviewed prior to call and leaving message  Procedure: Left - EXPLORATION OF LEFT CAROTID ARTERY; ABORTED ENDARTERECTOMY ARTERY CAROTID    Date of Procedure:  1/31/23    Surgeon:    Anh Herrmann, DO - Primary     * Edmond Sanchez, [address] [place] [address], DO - Fellow    Discharge Date: 2/3/23    Discharge Disposition:  Home      Anticoagulation pt was discharged on post op?: Aspirin and Apixaban (Eliquis)    Reminded pt of the following in message:    NEXT OFFICE VISIT SCHEDULED:  2/15/23 at 9 am with Dr Anh Godfrey at [address] [place] [address]    To contact [facility] with any concerns

## 2023-02-07 NOTE — TELEPHONE ENCOUNTER
Contacted patient and informed her of recommendations from Daniele Sánchez PA-C  Reviewed medication instructions  All questions answered

## 2023-02-07 NOTE — TELEPHONE ENCOUNTER
Vascular Nurse Navigator Post Op Call    Procedure: Left - EXPLORATION OF LEFT CAROTID ARTERY; ABORTED ENDARTERECTOMY ARTERY CAROTID     Date of Procedure:  1/31/23     Surgeon: Lelia Vasquez* Lacey Jeffries, DO - Primary     * Meera Erazo PA-C - Assisting     * Charito Asencio DO - Fellow     Discharge Date: 2/3/23     Discharge Disposition:  Home    Change in Vision?: No    Change in Speech?: No    Weakness?: No    Uncontrolled Pain?: Yes, see below    Hoarseness?: No    Trouble Swallowing?: No    Incision Concerns?: No    Anticoagulation pt was discharged on post op?: Aspirin and Apixaban (Eliquis)    Bleeding?: No    Fever/chills?: No      Reviewed discharge instructions and incision care with patient  NEXT OFFICE VISIT SCHEDULED:  2/15/23 at 9 am with Dr Karie Kyle at [address] Available?: Yes        Any further questions/concerns? Patient stated that she is not doing so hot since discharge  She stated that she is still in pain at her incision  She stated that it hurts to chew  She stated that her pain is a 7 out of 10 on the pain scale  She stated that she still has numbness and it is a little better since discharge  She stated that she is taking oxycodone about every 6 hours with last dose earlier this afternoon (could not state exact time)  She stated that she has 1 pill left  She stated that she tried Tylenol and it does not help  She was requesting a refill on her pain medication - to be sent to [facility] on [address] in [facility] because they deliver  She stated that her incision is unchanged from when she was in the hospital   Reviewed incision care with her - wash daily with soap and water  Reviewed discharge medications - Aspirin and Eliquis  All questions answered  Informed her that I would send this information to triage provider for recommendations and contact her back with recommendations  She was agreeable to same

## 2023-02-08 ENCOUNTER — HOME CARE VISIT (OUTPATIENT)
Dept: HOME HEALTH SERVICES | Facility: HOME HEALTHCARE | Age: 63
End: 2023-02-08

## 2023-02-08 ENCOUNTER — TELEPHONE (OUTPATIENT)
Dept: FAMILY MEDICINE CLINIC | Facility: CLINIC | Age: 63
End: 2023-02-08

## 2023-02-08 VITALS — OXYGEN SATURATION: 95 % | HEART RATE: 64 BPM | DIASTOLIC BLOOD PRESSURE: 92 MMHG | SYSTOLIC BLOOD PRESSURE: 134 MMHG

## 2023-02-08 NOTE — TELEPHONE ENCOUNTER
Received call from pt's visiting nurse, Elisa Gillespie  She is w/ pt now and wanted to check w/ us to see if it would be ok to apply either ice or heat to the incision to help the pain/swelling  Pt reports that the pain seems to be a little worse than yesterday but she is not sure if it is because she was laying on that side  She states that prior to taking oxycodone her pain was about 7/10  The pain medication brought the pain down to about 5/10  She is only taking oxycodone once or twice a day  She is also taking tramadol  Per Elisa Gillespie, the area is swollen but looks like normal post-op swelling  She does report the area is tender to touch and feels a little firm near the incision  There is no redness, drainage, or open areas  Elisa Gillespie is going to upload an updated picture to the pt's chart  Informed her I would send a message to our triage provider and we will call the pt back

## 2023-02-08 NOTE — TELEPHONE ENCOUNTER
Reviewed patient entered photo in media  Ok to place ice to the area for 10 minutes 4x/day as needed  Recommend that patient sleep in an elevated position with two pillows to assist with swelling  She should notify our office with any changes otherwise will evaluate at [address] scheduled 2/15 with Dr Latonia Krishna

## 2023-02-08 NOTE — TELEPHONE ENCOUNTER
Placed order for walker through [address] [place] [address]    Sent to Dr Eliot Lucas to sign

## 2023-02-09 ENCOUNTER — RA CDI HCC (OUTPATIENT)
Dept: OTHER | Facility: HOSPITAL | Age: 63
End: 2023-02-09

## 2023-02-09 VITALS
RESPIRATION RATE: 20 BRPM | HEART RATE: 60 BPM | OXYGEN SATURATION: 94 % | SYSTOLIC BLOOD PRESSURE: 138 MMHG | DIASTOLIC BLOOD PRESSURE: 82 MMHG | TEMPERATURE: 97.5 F

## 2023-02-09 NOTE — PROGRESS NOTES
[address]  coding opportunities       Chart reviewed, no opportunity found: CHART REVIEWED, NO OPPORTUNITY FOUND        Patients Insurance     Medicare Insurance: Medicare

## 2023-02-09 NOTE — CASE COMMUNICATION
OT contacted patient to schedule OT evaluation for 2/6/23  Patient notes having increased anxiety and difficulty with discomfort  Patient requested OT reschedule visit for later in the week to allow her to “balance out her anxiety and discomfort”  OT will reschedule visit later in the week as requested

## 2023-02-09 NOTE — TELEPHONE ENCOUNTER
Pt called, informed her of recommendations, pt verbalized understanding of same  Advised to call if any additional questions or concerns  She will do so

## 2023-02-09 NOTE — CASE COMMUNICATION
Please change next skilled nurse visit to [facility] 2/13 instead of Tuesday 2/14/23   Thanks

## 2023-02-10 ENCOUNTER — HOME CARE VISIT (OUTPATIENT)
Dept: HOME HEALTH SERVICES | Facility: HOME HEALTHCARE | Age: 63
End: 2023-02-10

## 2023-02-10 ENCOUNTER — OFFICE VISIT (OUTPATIENT)
Dept: FAMILY MEDICINE CLINIC | Facility: CLINIC | Age: 63
End: 2023-02-10

## 2023-02-10 VITALS
DIASTOLIC BLOOD PRESSURE: 94 MMHG | TEMPERATURE: 98.4 F | HEIGHT: 64 IN | BODY MASS INDEX: 32.95 KG/M2 | HEART RATE: 64 BPM | SYSTOLIC BLOOD PRESSURE: 134 MMHG | OXYGEN SATURATION: 96 % | WEIGHT: 193 LBS

## 2023-02-10 DIAGNOSIS — R10.32 LEFT LOWER QUADRANT ABDOMINAL PAIN: Primary | ICD-10-CM

## 2023-02-10 DIAGNOSIS — K55.069 MESENTERIC ARTERY THROMBOSIS (HCC): ICD-10-CM

## 2023-02-10 DIAGNOSIS — M79.602 LEFT ARM PAIN: ICD-10-CM

## 2023-02-10 DIAGNOSIS — F11.20 CONTINUOUS OPIOID DEPENDENCE (HCC): ICD-10-CM

## 2023-02-10 DIAGNOSIS — J43.8 OTHER EMPHYSEMA (HCC): ICD-10-CM

## 2023-02-10 DIAGNOSIS — I20.9 ANGINA PECTORIS (HCC): ICD-10-CM

## 2023-02-10 DIAGNOSIS — I11.0 HYPERTENSIVE HEART DISEASE WITH CONGESTIVE HEART FAILURE, UNSPECIFIED HEART FAILURE TYPE (HCC): ICD-10-CM

## 2023-02-10 DIAGNOSIS — E66.01 MORBID OBESITY (HCC): ICD-10-CM

## 2023-02-10 RX ORDER — TRAMADOL HYDROCHLORIDE 50 MG/1
50 TABLET ORAL 4 TIMES DAILY PRN
Qty: 120 TABLET | Refills: 3 | Status: SHIPPED | OUTPATIENT
Start: 2023-02-10 | End: 2023-02-17

## 2023-02-10 RX ORDER — NALOXONE HYDROCHLORIDE 4 MG/.1ML
SPRAY NASAL
Qty: 1 EACH | Refills: 1 | Status: SHIPPED | OUTPATIENT
Start: 2023-02-10

## 2023-02-10 NOTE — CASE COMMUNICATION
I evaluated this patient for P T  on 2/8/23 and she is doing well  Will benefit from HEP to increase LE strength  She is ambulating in home independently and we set up a ww for her as at times she feels weak  Her transfers are independent including getting into tub to shower and toilet transfers  Plan to visit 1 x per week for 3 weeks to teach a HEP

## 2023-02-10 NOTE — PROGRESS NOTES
Assessment/Plan: Labs records reviewed  Patient will follow with specialist appropriately  Continue with current regimen  Patient may use tramadol 1 to 2 tablets at a time up to 4 times a day  Patient go for CAT scan of the abdomen and pelvis  Diagnoses and all orders for this visit:    Left lower quadrant abdominal pain  -     CT abdomen pelvis w contrast; Future    Left arm pain  -     traMADol (ULTRAM) 50 mg tablet; Take 1 tablet (50 mg total) by mouth 4 (four) times a day as needed for severe pain    Morbid obesity (HCC)    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HCC)  -     naloxone (NARCAN) 4 mg/0 1 mL nasal spray; Administer 1 spray into a nostril  If no response after 2-3 minutes, give another dose in the other nostril using a new spray  Other emphysema ([address] )    Hypertensive heart disease with congestive heart failure, unspecified heart failure type (HCC)    Continuous opioid dependence ([address] )    Angina pectoris (HCC)            Subjective:        Patient ID: Leo Morelos is a 57 y o  female  Patient is here with left lower quadrant pain which persist   Patient status post surgery to the left neck  Patient given oxycodone but feels groggy/foggy with this  Patient wishes to go back to tramadol  No significant change in bowels  The following portions of the patient's history were reviewed and updated as appropriate: allergies, current medications, past family history, past medical history, past social history, past surgical history and problem list       Review of Systems   Constitutional: Negative  HENT: Negative  Eyes: Negative  Respiratory: Negative  Cardiovascular: Negative  Gastrointestinal: Positive for abdominal pain  Endocrine: Negative  Genitourinary: Negative  Musculoskeletal: Negative  Skin: Negative  Allergic/Immunologic: Negative  Neurological: Negative  Hematological: Negative  Psychiatric/Behavioral: Negative              Objective:      BMI Counseling: Body mass index is 33 13 kg/m²  The BMI is above normal  Nutrition recommendations include consuming healthier snacks  Exercise recommendations include exercising 3-5 times per week  Rationale for BMI follow-up plan is due to patient being overweight or obese  /94 (BP Location: Right arm, Patient Position: Sitting, Cuff Size: Standard)   Pulse 64   Temp 98 4 °F (36 9 °C) (Temporal)   Ht 5' 4" (1 626 m)   Wt 87 5 kg (193 lb)   LMP  (LMP Unknown)   SpO2 96%   BMI 33 13 kg/m²          Physical Exam  Vitals and nursing note reviewed  Constitutional:       General: She is not in acute distress  Appearance: She is not ill-appearing, toxic-appearing or diaphoretic  Cardiovascular:      Rate and Rhythm: Normal rate and regular rhythm  Pulses: Normal pulses  Heart sounds: Normal heart sounds  Pulmonary:      Effort: Pulmonary effort is normal       Breath sounds: Normal breath sounds  Abdominal:      General: Abdomen is flat  Palpations: Abdomen is soft  Tenderness: There is abdominal tenderness  There is no right CVA tenderness, left CVA tenderness, guarding or rebound  Comments: Tenderness left abdomen laterally   Skin:     Capillary Refill: Capillary refill takes less than 2 seconds  Psychiatric:         Mood and Affect: Mood normal          Behavior: Behavior normal          Thought Content:  Thought content normal

## 2023-02-11 PROBLEM — J06.9 ACUTE URI: Status: RESOLVED | Noted: 2022-12-13 | Resolved: 2023-02-11

## 2023-02-13 ENCOUNTER — HOME CARE VISIT (OUTPATIENT)
Dept: HOME HEALTH SERVICES | Facility: HOME HEALTHCARE | Age: 63
End: 2023-02-13

## 2023-02-13 NOTE — DISCHARGE SUMMARY
Vascular Surgery  Discharge Summary - Kasey Mcclure 70 y o  female MRN: [id number]    Unit/Bed#: E5 -01 Encounter: [id number]    Admission Date:   Admission Orders (From admission, onward)     Ordered        01/31/23 1213  Inpatient Admission  Once                        Admitting Diagnosis: Stenosis of left carotid artery [I65 22]    HPI: 57year old female former smoker w/HTN, HLD, pAfib, CAD, s/p PPM, CAMILLE, hx of SMA Thrombosis s/p BES @ [facility], obesity, hx of TIA, and asymptomatic L ICA stenosis    Procedures Performed: No orders of the defined types were placed in this encounter  Summary of Hospital Course: Patient presented to the hospital on 1/31/23 for elective L CEA for asymptomatic L ICA stenosis  Patient remained asymptomatic  Patient was taken to the OR and procedure was aborted secondary to anatomy  Patient was taken to the ICU for observation and management  Patient was noted to have some 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  By POD#1 the hypoglossal nerve palsy had resolved  She continued to have a left sided facial droop secondary to the marginal mandibular nerve palsy  This continued to improve  She was then deemed stable for discharge from a vascular standpoint with home PT/OT  Outpatient follow up was arranged with Dr Florin Huber for further discussion     Significant Findings, Care, Treatment and Services Provided: As noted above     Complications: Aborted L CEA     Discharge Diagnosis: Asymptomatic L ICA stenosis    Medical Problems     Resolved Problems  Date Reviewed: 2/10/2023   None         Condition at Discharge: stable         Discharge instructions/Information to patient and family:   See after visit summary for information provided to patient and family  Provisions for Follow-Up Care:  See after visit summary for information related to follow-up care and any pertinent home health orders        PCP: Susan Sales DO    Disposition: See After Visit Summary for discharge disposition information  Planned Readmission: Yes- for L TCAR       Discharge Statement   I spent 25 minutes discharging the patient  This time was spent on the day of discharge  I had direct contact with the patient on the day of discharge  Additional documentation is required if more than 30 minutes were spent on discharge  Discharge Medications:  See after visit summary for reconciled discharge medications provided to patient and family           Meera Erazo PA-C  2/13/2023

## 2023-02-13 NOTE — UTILIZATION REVIEW
NOTIFICATION OF ADMISSION DISCHARGE   This is a Notification of Discharge from [facility]  Please be advised that this patient has been discharge from our facility  Below you will find the admission and discharge date and time including the patient’s disposition  UTILIZATION REVIEW CONTACT:  Radha Munoz  Utilization   Network Utilization Review Department  Phone: [phone] x carefully listen to the prompts  All voicemails are confidential   Email: [email or web address]     ADMISSION INFORMATION  PRESENTATION DATE: 1/31/2023  7:43 AM  OBERVATION ADMISSION DATE:   INPATIENT ADMISSION DATE: 1/31/23 12:13 PM   DISCHARGE DATE: 2/3/2023  3:16 PM   DISPOSITION:Home/Self Care    IMPORTANT INFORMATION:  Send all requests for admission clinical reviews, approved or denied determinations and any other requests to dedicated fax number below belonging to the campus where the patient is receiving treatment   List of dedicated fax numbers:  [address] DENIALS (Administrative/Medical Necessity) [phone]   [address] (Maternity/NICU/Pediatrics) [phone]   [facility] [phone]   [facility] [phone]   [address] [phone]   220 [facility] [phone]   90 [facility] [phone]   [address] [place], [place] [address] [phone]   [facility]  [phone]   [facility] [phone]   [facility] [address] [facility] [phone]

## 2023-02-13 NOTE — CASE COMMUNICATION
[address] OT evaluation 2/10/23    OT plans to  continue to follow patient for  OT services 1 wk 1 then 2 x per wk x 2  weeks for provision/review of therapeutic exercise,  ADL training, review of safety with transfers/ mobility and  recommendations for DME to improve safety with performance of daily activities as appropriate

## 2023-02-14 ENCOUNTER — HOME CARE VISIT (OUTPATIENT)
Dept: HOME HEALTH SERVICES | Facility: HOME HEALTHCARE | Age: 63
End: 2023-02-14

## 2023-02-14 VITALS — OXYGEN SATURATION: 95 % | SYSTOLIC BLOOD PRESSURE: 142 MMHG | DIASTOLIC BLOOD PRESSURE: 80 MMHG | HEART RATE: 72 BPM

## 2023-02-15 VITALS — DIASTOLIC BLOOD PRESSURE: 86 MMHG | OXYGEN SATURATION: 93 % | HEART RATE: 60 BPM | SYSTOLIC BLOOD PRESSURE: 124 MMHG

## 2023-02-15 VITALS — HEART RATE: 72 BPM | SYSTOLIC BLOOD PRESSURE: 120 MMHG | DIASTOLIC BLOOD PRESSURE: 60 MMHG | OXYGEN SATURATION: 93 %

## 2023-02-16 ENCOUNTER — OFFICE VISIT (OUTPATIENT)
Dept: FAMILY MEDICINE CLINIC | Facility: CLINIC | Age: 63
End: 2023-02-16

## 2023-02-16 ENCOUNTER — HOSPITAL ENCOUNTER (OUTPATIENT)
Dept: RADIOLOGY | Facility: IMAGING CENTER | Age: 63
End: 2023-02-16

## 2023-02-16 ENCOUNTER — HOME CARE VISIT (OUTPATIENT)
Dept: HOME HEALTH SERVICES | Facility: HOME HEALTHCARE | Age: 63
End: 2023-02-16

## 2023-02-16 VITALS
BODY MASS INDEX: 32.78 KG/M2 | DIASTOLIC BLOOD PRESSURE: 80 MMHG | HEART RATE: 69 BPM | OXYGEN SATURATION: 95 % | HEIGHT: 64 IN | SYSTOLIC BLOOD PRESSURE: 122 MMHG | WEIGHT: 192 LBS | TEMPERATURE: 97.5 F

## 2023-02-16 DIAGNOSIS — I65.22 STENOSIS OF LEFT CAROTID ARTERY: Primary | ICD-10-CM

## 2023-02-16 DIAGNOSIS — I10 BENIGN ESSENTIAL HYPERTENSION: ICD-10-CM

## 2023-02-16 DIAGNOSIS — R29.810 FACIAL WEAKNESS: ICD-10-CM

## 2023-02-16 DIAGNOSIS — R20.0 NUMBNESS: ICD-10-CM

## 2023-02-16 DIAGNOSIS — I10 ESSENTIAL HYPERTENSION: ICD-10-CM

## 2023-02-16 DIAGNOSIS — R22.9 SUBCUTANEOUS NODULE: ICD-10-CM

## 2023-02-16 DIAGNOSIS — R10.32 LEFT LOWER QUADRANT ABDOMINAL PAIN: ICD-10-CM

## 2023-02-16 RX ORDER — SPIRONOLACTONE 25 MG/1
25 TABLET ORAL 2 TIMES DAILY
Qty: 180 TABLET | Refills: 0 | Status: SHIPPED | OUTPATIENT
Start: 2023-02-16

## 2023-02-16 RX ORDER — ONDANSETRON 4 MG/1
4 TABLET, ORALLY DISINTEGRATING ORAL EVERY 6 HOURS PRN
Qty: 60 TABLET | Refills: 0 | Status: SHIPPED | OUTPATIENT
Start: 2023-02-16

## 2023-02-16 RX ADMIN — IOHEXOL 100 ML: 350 INJECTION, SOLUTION INTRAVENOUS at 08:54

## 2023-02-16 RX ADMIN — CYANOCOBALAMIN 1000 MCG: 1000 INJECTION, SOLUTION INTRAMUSCULAR; SUBCUTANEOUS at 14:16

## 2023-02-16 NOTE — PROGRESS NOTES
Assessment/Plan: Hospital records reviewed  Patient will go for MRI of the brain to rule out CVA given facial weakness and weakness/pain left upper and lower extremities  Patient will be following up with vascular specialist next Wednesday  Diagnoses and all orders for this visit:    Stenosis of left carotid artery  -     MRI brain w wo contrast; Future    Essential hypertension    Numbness  -     MRI brain w wo contrast; Future    Facial weakness  -     MRI brain w wo contrast; Future            Subjective:        Patient ID: Nhan Posadas is a 70 y o  female  Patient is here status post hospitalization from January 31 through the third for stenosis left carotid artery  Patient did have surgery but it had to be aborted per patient  Patient with some drooping of the left lower eyelid  Patient also with some numbness left face  Patient with some pain in left arm and leg  Patient also with some weakness of the left leg  The following portions of the patient's history were reviewed and updated as appropriate: allergies, current medications, past family history, past medical history, past social history, past surgical history and problem list       Review of Systems   Constitutional: Negative  HENT: Negative  Eyes: Negative  Respiratory: Negative  Cardiovascular: Negative  Gastrointestinal: Negative  Endocrine: Negative  Genitourinary: Negative  Musculoskeletal: Positive for arthralgias  Skin: Negative  Allergic/Immunologic: Negative  Neurological: Positive for weakness and numbness  Hematological: Negative  Psychiatric/Behavioral: Negative  Objective:        Urinary Incontinence Plan of Care: counseling topics discussed: practice Kegel (pelvic floor strengthening) exercises               /80 (BP Location: Left arm, Patient Position: Sitting, Cuff Size: Large)   Pulse 69   Temp 97 5 °F (36 4 °C) (Temporal)   Ht 5' 4" (1 626 m)   Wt 87 1 kg (192 lb)   LMP  (LMP Unknown)   SpO2 95%   BMI 32 96 kg/m²          Physical Exam  Vitals and nursing note reviewed  Constitutional:       General: She is not in acute distress  Appearance: Normal appearance  She is not ill-appearing, toxic-appearing or diaphoretic  HENT:      Head: Normocephalic and atraumatic  Right Ear: Tympanic membrane, ear canal and external ear normal  There is no impacted cerumen  Left Ear: Tympanic membrane, ear canal and external ear normal  There is no impacted cerumen  Nose: Nose normal  No congestion or rhinorrhea  Mouth/Throat:      Mouth: Mucous membranes are moist       Pharynx: No oropharyngeal exudate or posterior oropharyngeal erythema  Eyes:      General: No scleral icterus  Right eye: No discharge  Left eye: No discharge  Extraocular Movements: Extraocular movements intact  Conjunctiva/sclera: Conjunctivae normal       Pupils: Pupils are equal, round, and reactive to light  Neck:      Vascular: No carotid bruit  Cardiovascular:      Rate and Rhythm: Normal rate and regular rhythm  Pulses: Normal pulses  Heart sounds: Normal heart sounds  No murmur heard  No friction rub  No gallop  Pulmonary:      Effort: Pulmonary effort is normal  No respiratory distress  Breath sounds: Normal breath sounds  No stridor  No wheezing, rhonchi or rales  Chest:      Chest wall: No tenderness  Musculoskeletal:         General: Tenderness present  No swelling, deformity or signs of injury  Cervical back: Normal range of motion and neck supple  No rigidity  No muscular tenderness  Right lower leg: No edema  Left lower leg: No edema  Lymphadenopathy:      Cervical: No cervical adenopathy  Skin:     General: Skin is warm and dry  Capillary Refill: Capillary refill takes less than 2 seconds  Coloration: Skin is not jaundiced  Findings: No bruising, erythema, lesion or rash  Neurological:      Mental Status: She is alert and oriented to person, place, and time  Cranial Nerves: Cranial nerve deficit present  Sensory: No sensory deficit  Motor: Weakness present  Coordination: Coordination normal       Gait: Gait normal       Comments: Facial weakness   Psychiatric:         Mood and Affect: Mood normal          Behavior: Behavior normal          Thought Content:  Thought content normal          Judgment: Judgment normal

## 2023-02-16 NOTE — PROGRESS NOTES
TCM Call     Date and time call was made  2/6/2023 12:28 PM    Hospital care reviewed  Records reviewed    Patient was hospitialized at  [facility] - CLOSED    Date of Admission  01/31/23    Date of discharge  02/03/23    Diagnosis  STENOSIS LEFT CAROTID ARTERY    Disposition  Home    Were the patients medications reviewed and updated  Yes    Current Symptoms  None      TCM Call     Post hospital issues  None    Should patient be enrolled in anticoag monitoring? No    Scheduled for follow up? Yes    Patients specialists  Neurologist    Neurologist name  Follow up with [facility] Neurology [facility] (Neurology); Please follow up with Neurology  The  will call you to set up an appointment   If you do not hear from the  within 1 week,    Did you obtain your prescribed medications  Yes    Do you need help managing your prescriptions or medications  No    Is transportation to your appointment needed  No    I have advised the patient to call PCP with any new or worsening symptoms  Meliton MORENO  Family    The type of support provided  Emotional; Financial; Physical    Do you have social support  Yes, as much as I need    Are you recieving any outpatient services  Yes    What type of services  NURSES    Are you recieving home care services  Yes    Types of home care services  Nurse visit    Are you using any community resources  No    Current waiver services  No    Have you fallen in the last 12 months  No    Interperter language line needed  No    Counseling  Patient

## 2023-02-17 ENCOUNTER — HOME CARE VISIT (OUTPATIENT)
Dept: HOME HEALTH SERVICES | Facility: HOME HEALTHCARE | Age: 63
End: 2023-02-17

## 2023-02-17 VITALS
HEART RATE: 67 BPM | SYSTOLIC BLOOD PRESSURE: 132 MMHG | TEMPERATURE: 97.3 F | RESPIRATION RATE: 16 BRPM | DIASTOLIC BLOOD PRESSURE: 76 MMHG | OXYGEN SATURATION: 95 %

## 2023-02-17 DIAGNOSIS — M54.2 NECK PAIN: Primary | ICD-10-CM

## 2023-02-17 RX ORDER — TRAMADOL HYDROCHLORIDE 50 MG/1
100 TABLET ORAL EVERY 8 HOURS PRN
Qty: 60 TABLET | Refills: 0 | Status: SHIPPED | OUTPATIENT
Start: 2023-02-17

## 2023-02-17 NOTE — TELEPHONE ENCOUNTER
Patient called and is asking if you can send her over something for pain  She experiencing sever pain in neck, left lower leg, ankle, and foot  She was in to see you yesterday

## 2023-02-20 ENCOUNTER — APPOINTMENT (EMERGENCY)
Dept: CT IMAGING | Facility: HOSPITAL | Age: 63
End: 2023-02-20

## 2023-02-20 ENCOUNTER — HOSPITAL ENCOUNTER (INPATIENT)
Facility: HOSPITAL | Age: 63
LOS: 2 days | Discharge: HOME HEALTH | End: 2023-02-22
Attending: EMERGENCY MEDICINE | Admitting: HOSPITALIST

## 2023-02-20 ENCOUNTER — TELEPHONE (OUTPATIENT)
Dept: FAMILY MEDICINE CLINIC | Facility: CLINIC | Age: 63
End: 2023-02-20

## 2023-02-20 ENCOUNTER — HOME CARE VISIT (OUTPATIENT)
Dept: HOME HEALTH SERVICES | Facility: HOME HEALTHCARE | Age: 63
End: 2023-02-20

## 2023-02-20 DIAGNOSIS — M79.602 LEFT ARM PAIN: ICD-10-CM

## 2023-02-20 DIAGNOSIS — R20.0 NUMBNESS: ICD-10-CM

## 2023-02-20 DIAGNOSIS — R29.90 STROKE-LIKE SYMPTOMS: ICD-10-CM

## 2023-02-20 DIAGNOSIS — R53.82 CHRONIC FATIGUE: ICD-10-CM

## 2023-02-20 DIAGNOSIS — G51.0 PARTIAL FACIAL NERVE PALSY: Primary | ICD-10-CM

## 2023-02-20 DIAGNOSIS — M54.16 LUMBAR RADICULOPATHY: ICD-10-CM

## 2023-02-20 DIAGNOSIS — I65.22 STENOSIS OF LEFT CAROTID ARTERY: ICD-10-CM

## 2023-02-20 DIAGNOSIS — R20.2 PARESTHESIAS: ICD-10-CM

## 2023-02-20 DIAGNOSIS — E53.8 VITAMIN B12 DEFICIENCY: ICD-10-CM

## 2023-02-20 PROBLEM — R29.810 FACIAL DROOP: Status: ACTIVE | Noted: 2023-02-20

## 2023-02-20 LAB
2HR DELTA HS TROPONIN: 1 NG/L
4HR DELTA HS TROPONIN: 3 NG/L
ANION GAP SERPL CALCULATED.3IONS-SCNC: 8 MMOL/L (ref 4–13)
APTT PPP: 32 SECONDS (ref 23–37)
ATRIAL RATE: 60 BPM
ATRIAL RATE: 61 BPM
BUN SERPL-MCNC: 22 MG/DL (ref 5–25)
CALCIUM SERPL-MCNC: 9.4 MG/DL (ref 8.4–10.2)
CARDIAC TROPONIN I PNL SERPL HS: 5 NG/L
CARDIAC TROPONIN I PNL SERPL HS: 6 NG/L
CARDIAC TROPONIN I PNL SERPL HS: 8 NG/L
CHLORIDE SERPL-SCNC: 101 MMOL/L (ref 96–108)
CO2 SERPL-SCNC: 26 MMOL/L (ref 21–32)
CREAT SERPL-MCNC: 0.66 MG/DL (ref 0.6–1.3)
ERYTHROCYTE [DISTWIDTH] IN BLOOD BY AUTOMATED COUNT: 12.2 % (ref 11.6–15.1)
FLUAV RNA RESP QL NAA+PROBE: NEGATIVE
FLUBV RNA RESP QL NAA+PROBE: NEGATIVE
GFR SERPL CREATININE-BSD FRML MDRD: 92 ML/MIN/1.73SQ M
GLUCOSE SERPL-MCNC: 93 MG/DL (ref 65–140)
GLUCOSE SERPL-MCNC: 94 MG/DL (ref 65–140)
HCT VFR BLD AUTO: 37.9 % (ref 34.8–46.1)
HGB BLD-MCNC: 12.7 G/DL (ref 11.5–15.4)
INR PPP: 1.14 (ref 0.84–1.19)
MCH RBC QN AUTO: 30.1 PG (ref 26.8–34.3)
MCHC RBC AUTO-ENTMCNC: 33.5 G/DL (ref 31.4–37.4)
MCV RBC AUTO: 90 FL (ref 82–98)
P AXIS: 1 DEGREES
P AXIS: 11 DEGREES
PLATELET # BLD AUTO: 233 THOUSANDS/UL (ref 149–390)
PMV BLD AUTO: 9 FL (ref 8.9–12.7)
POTASSIUM SERPL-SCNC: 4.7 MMOL/L (ref 3.5–5.3)
PR INTERVAL: 158 MS
PR INTERVAL: 166 MS
PROTHROMBIN TIME: 14.6 SECONDS (ref 11.6–14.5)
QRS AXIS: -24 DEGREES
QRS AXIS: -26 DEGREES
QRSD INTERVAL: 88 MS
QRSD INTERVAL: 88 MS
QT INTERVAL: 444 MS
QT INTERVAL: 446 MS
QTC INTERVAL: 444 MS
QTC INTERVAL: 448 MS
RBC # BLD AUTO: 4.22 MILLION/UL (ref 3.81–5.12)
RSV RNA RESP QL NAA+PROBE: NEGATIVE
SARS-COV-2 RNA RESP QL NAA+PROBE: NEGATIVE
SODIUM SERPL-SCNC: 135 MMOL/L (ref 135–147)
T WAVE AXIS: 19 DEGREES
T WAVE AXIS: 31 DEGREES
VENTRICULAR RATE: 60 BPM
VENTRICULAR RATE: 61 BPM
WBC # BLD AUTO: 6.62 THOUSAND/UL (ref 4.31–10.16)

## 2023-02-20 RX ORDER — ONDANSETRON 2 MG/ML
4 INJECTION INTRAMUSCULAR; INTRAVENOUS EVERY 6 HOURS PRN
Status: DISCONTINUED | OUTPATIENT
Start: 2023-02-20 | End: 2023-02-21

## 2023-02-20 RX ORDER — FLUTICASONE FUROATE AND VILANTEROL 200; 25 UG/1; UG/1
1 POWDER RESPIRATORY (INHALATION) DAILY
Status: DISCONTINUED | OUTPATIENT
Start: 2023-02-21 | End: 2023-02-22 | Stop reason: HOSPADM

## 2023-02-20 RX ORDER — ACETAMINOPHEN 325 MG/1
650 TABLET ORAL EVERY 6 HOURS PRN
Status: DISCONTINUED | OUTPATIENT
Start: 2023-02-20 | End: 2023-02-22 | Stop reason: HOSPADM

## 2023-02-20 RX ORDER — ASPIRIN 81 MG/1
81 TABLET, CHEWABLE ORAL DAILY
Status: DISCONTINUED | OUTPATIENT
Start: 2023-02-21 | End: 2023-02-20

## 2023-02-20 RX ORDER — FAMOTIDINE 20 MG/1
20 TABLET, FILM COATED ORAL
Status: DISCONTINUED | OUTPATIENT
Start: 2023-02-20 | End: 2023-02-22 | Stop reason: HOSPADM

## 2023-02-20 RX ORDER — MONTELUKAST SODIUM 10 MG/1
10 TABLET ORAL
Status: DISCONTINUED | OUTPATIENT
Start: 2023-02-20 | End: 2023-02-22 | Stop reason: HOSPADM

## 2023-02-20 RX ORDER — LORAZEPAM 1 MG/1
1 TABLET ORAL EVERY 6 HOURS PRN
Status: DISCONTINUED | OUTPATIENT
Start: 2023-02-20 | End: 2023-02-22 | Stop reason: HOSPADM

## 2023-02-20 RX ORDER — ASPIRIN 81 MG/1
81 TABLET, CHEWABLE ORAL DAILY
Status: DISCONTINUED | OUTPATIENT
Start: 2023-02-21 | End: 2023-02-22 | Stop reason: HOSPADM

## 2023-02-20 RX ORDER — ASCORBIC ACID 500 MG
1000 TABLET ORAL DAILY
Status: DISCONTINUED | OUTPATIENT
Start: 2023-02-21 | End: 2023-02-22 | Stop reason: HOSPADM

## 2023-02-20 RX ORDER — METOPROLOL SUCCINATE 50 MG/1
100 TABLET, EXTENDED RELEASE ORAL 2 TIMES DAILY
Status: DISCONTINUED | OUTPATIENT
Start: 2023-02-20 | End: 2023-02-22 | Stop reason: HOSPADM

## 2023-02-20 RX ORDER — SPIRONOLACTONE 25 MG/1
25 TABLET ORAL 2 TIMES DAILY
Status: DISCONTINUED | OUTPATIENT
Start: 2023-02-20 | End: 2023-02-22 | Stop reason: HOSPADM

## 2023-02-20 RX ORDER — CHLORDIAZEPOXIDE HYDROCHLORIDE AND CLIDINIUM BROMIDE 5; 2.5 MG/1; MG/1
1 CAPSULE ORAL 2 TIMES DAILY PRN
COMMUNITY

## 2023-02-20 RX ORDER — HYDROXYZINE HYDROCHLORIDE 25 MG/1
50 TABLET, FILM COATED ORAL
Status: DISCONTINUED | OUTPATIENT
Start: 2023-02-20 | End: 2023-02-22 | Stop reason: HOSPADM

## 2023-02-20 RX ORDER — FUROSEMIDE 40 MG/1
40 TABLET ORAL DAILY
Status: DISCONTINUED | OUTPATIENT
Start: 2023-02-21 | End: 2023-02-22 | Stop reason: HOSPADM

## 2023-02-20 RX ORDER — PHENAZOPYRIDINE HYDROCHLORIDE 100 MG/1
200 TABLET, FILM COATED ORAL 3 TIMES DAILY PRN
Status: DISCONTINUED | OUTPATIENT
Start: 2023-02-20 | End: 2023-02-22 | Stop reason: HOSPADM

## 2023-02-20 RX ORDER — PANTOPRAZOLE SODIUM 40 MG/1
40 TABLET, DELAYED RELEASE ORAL
Status: DISCONTINUED | OUTPATIENT
Start: 2023-02-21 | End: 2023-02-22 | Stop reason: HOSPADM

## 2023-02-20 RX ORDER — BUTALBITAL, ACETAMINOPHEN AND CAFFEINE 50; 325; 40 MG/1; MG/1; MG/1
1 TABLET ORAL EVERY 6 HOURS PRN
Status: DISCONTINUED | OUTPATIENT
Start: 2023-02-20 | End: 2023-02-22 | Stop reason: HOSPADM

## 2023-02-20 RX ORDER — TRAMADOL HYDROCHLORIDE 50 MG/1
100 TABLET ORAL EVERY 8 HOURS PRN
Status: DISCONTINUED | OUTPATIENT
Start: 2023-02-20 | End: 2023-02-22 | Stop reason: HOSPADM

## 2023-02-20 RX ORDER — ATORVASTATIN CALCIUM 40 MG/1
40 TABLET, FILM COATED ORAL EVERY EVENING
Status: DISCONTINUED | OUTPATIENT
Start: 2023-02-20 | End: 2023-02-22 | Stop reason: HOSPADM

## 2023-02-20 RX ADMIN — TRAMADOL HYDROCHLORIDE 100 MG: 50 TABLET, COATED ORAL at 19:48

## 2023-02-20 RX ADMIN — MONTELUKAST 10 MG: 10 TABLET, FILM COATED ORAL at 22:36

## 2023-02-20 RX ADMIN — SPIRONOLACTONE 25 MG: 25 TABLET, FILM COATED ORAL at 18:18

## 2023-02-20 RX ADMIN — APIXABAN 5 MG: 5 TABLET, FILM COATED ORAL at 18:19

## 2023-02-20 RX ADMIN — LORAZEPAM 1 MG: 1 TABLET ORAL at 19:49

## 2023-02-20 RX ADMIN — IOHEXOL 85 ML: 350 INJECTION, SOLUTION INTRAVENOUS at 14:06

## 2023-02-20 RX ADMIN — FAMOTIDINE 20 MG: 20 TABLET, FILM COATED ORAL at 22:36

## 2023-02-20 RX ADMIN — HYDROXYZINE HYDROCHLORIDE 50 MG: 25 TABLET ORAL at 22:36

## 2023-02-20 RX ADMIN — METOPROLOL SUCCINATE 100 MG: 50 TABLET, EXTENDED RELEASE ORAL at 18:19

## 2023-02-20 RX ADMIN — ONDANSETRON HYDROCHLORIDE 4 MG: 2 SOLUTION INTRAMUSCULAR; INTRAVENOUS at 22:36

## 2023-02-20 NOTE — TELEPHONE ENCOUNTER
Karolina Richardson from [facility] OT called in stating he was there for a visit with Trini Segundo today and her B/p was 160/100 and she was SOB  Her Heart rate was 43 and he checked it again and it was 38  They are wondering if patient should go to the ER  Reviewed info with DR Kisha Stephenson and he is recommending she go to the ER as well  Karolina Richardson will be advising patient to do so

## 2023-02-20 NOTE — CONSULTS
Consultation - Stroke   John Lower 70 y o  female MRN: [id number]  Unit/Bed#: ED-09 Encounter: [id number]      Assessment/Plan   70year old female with recent failed L CEA attempted on January 31st (with concern for mandibular/hypoglossal nerve palsy given L facial droop and numbness), chronic headaches/migraines, anxiety, afib on Eliquis  Presents as stroke alert on 02/20 at recommendation of home PT/OT and PCP following ongoing L facial numbness, L facial asymmetry and L chest/L LE pain and weakness; when discussed further with patient, she endorses that these symptoms are not acute and have been ongoing for at least several days  In reviewing vascular surgery notes; patient was with hypoglossal/mandibular nerve palsy following CEA attempt on 1/31/23; residual deficits included L facial asymmetry and L facial numbness  L ICA stenosis does not correlate with the above symptoms  Does also endorse history of frequent headaches, with a more severe headache earlier today  Low suspicion overall for cerebrovascular event  Thrombolytic Decision: Patient not a candidate  Unclear time of onset outside appropriate time window  Plan:  -do NOT feel stroke pathway/additional neurodiagnostics  necessary  -CT head negative for acute intracranial pathology  -CTA head/neck with known high grade L ICA stenosis (status post aborted L CEA attempt in late January)  -continue Eliquis/aspirin/statin for stroke prevention  -defer workup of L LE/chest pain to ED/primary teams  -ongoing close vascular surgery follow up following aborted CEA to discuss future options (TCAR? Etc )  -supportive care  -will arrange for outpatient neurology team follow up for symptom workup/management (consider UE EMG/NCS)    Discussed plan of care with attending neurologist throughout stroke alert       History of Present Illness     Reason for Consult / Principal Problem: Stroke alert, L facial numbness, L UE/LE pain and weakness, sensory changes    Hx and PE limited by: patient anxious/impercise historian   Patient last known well: Unclear   Stroke alert called: 1:59 PM  Neurology time of arrival: 2:01 PM  HPI: Danielle Tucker is a 70 y o  female with history as mentioned above in assessment who neurology is asked to evaluate as stroke alert this afternoon due to the above  Per discussion with patient and chart review:     Was undergoing PT/OT at home following recent aborted L CEA attempt in late January (see below for more detail); patient had been experiencing at least several days of posterior L LE pain and L hand/UE pain and weakness with paresthesias  Known to neuro-hospitalist team in September 2022 (stroke alert for L facial and extremity motor/sensory deficits with negative MRI; felt to be possibly attributable to atypical migraine  Followed with headache clinic in November 2022; noted at that time with chronic daily headaches for few years with daily (if not more) Fioricet use (with plan to taper off given 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 dependence)  Also taking Tramadol daily and benxo for abdominal pain  In late January 2023; underwent elective L CEA for ICA stenosis; attempt was aborted due to anatomical difficulties; post operatively she had concern for mandibular/hypoglossal nerve palsy with L facial asymmetry and L facial numbness (nerves were manipulated during surgery/neuropraxia suspicion)  Consult to Neurology  Consult performed by:  Qasim Glass PA-C  Consult ordered by: Caroline Pulido MD          Review of Systems   Unable to perform ROS: Acuity of condition       Historical Information   Past Medical History:   Diagnosis Date   • A-fib [facility]) 03/2020   • Allergic    • Anxiety    • Arthritis    • Asthma    • Back pain     and muscle pain   • Bruises easily    • Chronic pain disorder     Interstitial pain   • Colon polyp    • Dental bridge present    • Depression    • Eczema    • GERD (gastroesophageal reflux disease) • Heart murmur    • History of blood clots     per pt "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"   • History of pneumonia    • Hives    • Hyperlipidemia    • Hypertension    • Infertility, female    • Interstitial cystitis    • Migraine     sees neurologist   • Motion sickness    • Palpitations    • PONV (postoperative nausea and vomiting)    • Psychiatric disorder    • TIA (transient ischemic attack) 2022    per pt --"had MRI and 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"   • Urinary incontinence     wears Pads   • Wears glasses      Past Surgical History:   Procedure Laterality Date   • COLONOSCOPY     • DILATION AND CURETTAGE OF UTERUS     • EGD     • EXPLORATORY LAPAROTOMY      for infertility   • HAND SURGERY      Hand excision of tendon cyst   • AR LAPAROSCOPIC APPENDECTOMY N/A 2022    Procedure: APPENDECTOMY LAPAROSCOPIC;  Surgeon:  Kelly Novoa MD;  Location: [facility];  Service: General   • AR TEAEC W/PATCH GRF CAROTID VERTB Víctor Left 2023    Procedure: EXPLORATION OF LEFT CAROTID ARTERY; ABORTED ENDARTERECTOMY ARTERY CAROTID;  Surgeon: Malika Mcwilliams DO;  Location: [facility];  Service: Vascular   • SUPERIOR MESTENTERIC ARTERY STENT     • WISDOM TOOTH EXTRACTION       Social History   Social History     Substance and Sexual Activity   Alcohol Use Never     Social History     Substance and Sexual Activity   Drug Use No     E-Cigarette/Vaping   • E-Cigarette Use Never User      E-Cigarette/Vaping Substances   • Nicotine No    • THC No    • CBD No    • Flavoring No    • Other No    • Unknown No      Social History     Tobacco Use   Smoking Status Former   • Packs/day: 0 50   • Years: 10 00   • Pack years: 5 00   • Types: Cigarettes   • Quit date:    • Years since quittin 1   • Passive exposure: Never   Smokeless Tobacco Never     Family History:   Family History   Problem Relation Age of Onset   • Lung cancer Mother 61   • Brain cancer Mother 61   • Diabetes Father    • Depression Father    • Other Father         septic   • Allergies Sister    • Hashimoto's thyroiditis Sister    • Abdominal aortic aneurysm Sister    • Diabetes Sister    • No Known Problems Sister    • No Known Problems Maternal Grandmother    • No Known Problems Maternal Grandfather    • No Known Problems Paternal Grandmother    • No Known Problems Paternal Grandfather    • Hodgkin's lymphoma Brother    • No Known Problems Brother    • No Known Problems Maternal Aunt    • Diabetes Other        Review of previous medical records was completed  Meds/Allergies   current meds:   Current Facility-Administered Medications   Medication Dose Route Frequency   • cyanocobalamin injection 1,000 mcg  1,000 mcg Intramuscular Q30 Days   • cyanocobalamin injection 1,000 mcg  1,000 mcg Intramuscular Q30 Days    and PTA meds:   Prior to Admission Medications   Prescriptions Last Dose Informant Patient Reported? Taking?    Ascorbic Acid (vitamin C) 1000 MG tablet   Yes No   Sig: Take 1,000 mg by mouth daily   Biotin w/ Vitamins C & E (HAIR/SKIN/NAILS PO)   Yes No   Sig: Take by mouth   Cholecalciferol 25 MCG (1000 UT) tablet   Yes No   Sig: Take 1,000 Units by mouth daily   Docusate Sodium (COLACE PO)   Yes No   Sig: Take by mouth daily at bedtime   Evolocumab 140 MG/ML SOAJ   No No   Sig: Inject 1 mL (140 mg total) under the skin every 14 (fourteen) days   Galcanezumab-gnlm 120 MG/ML SOAJ   No No   Sig: Inject 120 mg under the skin every 30 (thirty) days   Ginger, Zingiber officinalis, (GINGER PO)   Yes No   Sig: Take 1 tablet by mouth if needed   LORazepam (ATIVAN) 1 mg tablet   No No   Sig: Take 1 tablet (1 mg total) by mouth every 6 (six) hours as needed for anxiety   MULTIPLE VITAMIN PO   Yes No   Sig: Take by mouth   Probiotic Product (PROBIOTIC-10 PO)   Yes No   Sig: Take 1 tablet by mouth in the morning   Turmeric 500 MG CAPS   Yes No   Zinc 100 MG TABS   Yes No   Sig: Take by mouth daily   apixaban (Eliquis) 5 mg   No No   Sig: Take 1 tablet (5 mg total) by mouth 2 (two) times a day   aspirin 81 mg chewable tablet   No No   Sig: Chew 1 tablet (81 mg total) daily   butalbital-acetaminophen-caffeine (FIORICET,ESGIC) -40 mg per tablet   No No   Sig: Take 1 tablet by mouth every 6 (six) hours as needed for headaches   clotrimazole-betamethasone (LOTRISONE) 1-0 05 % cream   Yes No   diltiazem (DILACOR XR) 120 MG 24 hr capsule   No No   Sig: One tablet daily as needed for palpitations   famotidine (PEPCID) 20 mg tablet   Yes No   Sig: Take 20 mg by mouth daily at bedtime   fexofenadine (ALLEGRA) 180 MG tablet   Yes No   Sig: Take 180 mg by mouth daily   fluticasone-vilanterol (BREO ELLIPTA) 200-25 MCG/INH inhaler   No No   Sig: Inhale 1 puff daily Rinse mouth after use  furosemide (LASIX) 40 mg tablet   No No   Sig: Take 1 tablet (40 mg total) by mouth daily   hydrOXYzine HCL (ATARAX) 25 mg tablet   No No   Sig: Take 2 tablets (50 mg total) by mouth daily at bedtime   metoprolol succinate (TOPROL-XL) 100 mg 24 hr tablet   No No   Sig: TAKE ONE TABLET BY MOUTH TWICE DAILY   montelukast (SINGULAIR) 10 mg tablet   No No   Sig: TAKE ONE TABLET BY MOUTH DAILY AT BEDTIME   naloxone (NARCAN) 4 mg/0 1 mL nasal spray   No No   Sig: Administer 1 spray into a nostril  If no response after 2-3 minutes, give another dose in the other nostril using a new spray     omeprazole (PriLOSEC) 40 MG capsule   No No   Sig: Take 1 capsule (40 mg total) by mouth daily   ondansetron (Zofran ODT) 4 mg disintegrating tablet   No No   Sig: Take 1 tablet (4 mg total) by mouth every 6 (six) hours as needed for nausea or vomiting   phenazopyridine (PYRIDIUM) 200 mg tablet   No No   Sig: Take 1 tablet (200 mg total) by mouth 3 (three) times a day as needed for bladder spasms   spironolactone (ALDACTONE) 25 mg tablet   No No   Sig: Take 1 tablet (25 mg total) by mouth 2 (two) times a day   traMADol (Ultram) 50 mg tablet   No No   Sig: Take 2 tablets (100 mg total) by mouth every 8 (eight) hours as needed for moderate pain      Facility-Administered Medications Last Administration Doses Remaining   cyanocobalamin injection 1,000 mcg 2/16/2023  2:16 PM    cyanocobalamin injection 1,000 mcg 11/28/2022 10:17 AM           Allergies   Allergen Reactions   • Ace Inhibitors Angioedema and Anaphylaxis     Anaphylaxis   • Benicar [Olmesartan] Angioedema     See Allergy note from 9/11/2008  Swollen ankles/legs   • Hydralazine Other (See Comments)     Lip swelling  Flank pain   • Other Anaphylaxis and Other (See Comments)     Other reaction(s): angioadema  Other reaction(s): Other (See Comments)  Preservatives- itching throat closes, hi  Other reaction(s): angioadema  E Z Cat - hives throat closes itching,  Preservatives- itching throat closes, hi  Artificial sweeteners   • Valsartan Angioedema     Lips, face swollen   • Sulfa Antibiotics Hives     stuffiness,itching,hives,throat closing--per pt "sometimes able to take depending on the brand and the filler or possibly preservatives in it"   • Ampicillin Hives     Depends on brand some preservatives can react  Has recently tolerated oral amoxicillin  • Motrin [Ibuprofen] Other (See Comments)     Per pt " told not to take due to A Fib"   • Wound Dressing Adhesive Other (See Comments)     Per pt Adhesive on EKG leads caused redness       Objective   Vitals:Blood pressure 162/72, pulse 92, temperature 98 1 °F (36 7 °C), resp  rate 16, weight 90 2 kg (198 lb 13 7 oz), SpO2 95 %, not currently breastfeeding  ,Body mass index is 34 13 kg/m²  No intake or output data in the 24 hours ending 02/20/23 1430    Invasive Devices: Invasive Devices     Peripheral Intravenous Line  Duration           Peripheral IV 02/20/23 Distal;Left;Upper;Ventral (anterior) Arm <1 day              Examined alongside Dr Cristobal Reeder       Physical Exam  Constitutional:       Comments: Anxious/tearful during exam     HENT:      Head: Normocephalic and atraumatic  Eyes:      Extraocular Movements: Extraocular movements intact  Conjunctiva/sclera: Conjunctivae normal    Neck:      Comments: L carotid post-surgical scarring noted, no evidence of adjacent erythema/swelling, no tenderness to palpation  Cardiovascular:      Rate and Rhythm: Normal rate  Pulmonary:      Effort: Pulmonary effort is normal    Abdominal:      General: There is no distension  Musculoskeletal:         General: Normal range of motion  Cervical back: Normal range of motion and neck supple  Skin:     General: Skin is warm and dry  Neurological:      Mental Status: She is alert  Coordination: Finger-Nose-Finger Test and Heel to [place] riya Test normal        Neurologic Exam     Mental Status   Awake, anxious, but fully oriented, speaking clearly without dysarthria nor aphasia  Following all commands  Cranial Nerves   Resting L lower facial asymmetry but with animation/smiling and conversation, lower facial muscles activate symmetrically  Diminished L facial sensation to light touch compared to R      Motor Exam Full appearing strength throughout UE and LE; no focal deficit nor laterality on exam       Sensory Exam   Diminished to light touch in distal L UE compared to R; symmetric light touch sensation in the bilateral LE  Gait, Coordination, and Reflexes     Coordination   Finger to nose coordination: normal  Heel to shin coordination: normal    Tremor   Resting tremor: absent  Intention tremor: absent  No UMN signs, downgoing toes, negative hoffmans, gait deferred  No unilateral orbiting with arm rolling  Positive tinel's bilaterally at the wrists  NIHSS:  1a Level of Consciousness: 0 = Alert   1b  LOC Questions: 0 = Answers both correctly   1c  LOC Commands: 0 = Obeys both correctly   2  Best Gaze: 0 = Normal   3  Visual: 0 = No visual field loss   4  Facial Palsy: 0=Normal symmetric movement   5a   Motor Right Arm: 0=No drift, limb holds 90 (or 45) degrees for full 10 seconds   5b  Motor Left Arm: 0=No drift, limb holds 90 (or 45) degrees for full 10 seconds   6a  Motor Right Le=No drift, limb holds 90 (or 45) degrees for full 10 seconds   6b  Motor Left Le=No drift, limb holds 90 (or 45) degrees for full 10 seconds   7  Limb Ataxia:  0=Absent   8  Sensory: 1=Mild to moderate sensory loss; patient feels pinprick is less sharp or is dull on the affected side; there is a loss of superficial pain with pinprick but patient is aware She is being touched   9  Best Language:  0=No aphasia, normal   10  Dysarthria: 0=Normal articulation   11  Extinction and Inattention (formerly Neglect): 0=No abnormality   Total Score: 1     Time NIHSS was completed: 2:05 PM    Modified Jyoti Score:  Unable to determine currently, will gather additional data    Lab Results:   CBC:   Results from last 7 days   Lab Units 23  1403   WBC Thousand/uL 6 62   RBC Million/uL 4 22   HEMOGLOBIN g/dL 12 7   HEMATOCRIT % 37 9   MCV fL 90   PLATELETS Thousands/uL 233   , BMP/CMP:   Results from last 7 days   Lab Units 23  1403   SODIUM mmol/L 135   POTASSIUM mmol/L 4 7   CHLORIDE mmol/L 101   CO2 mmol/L 26   BUN mg/dL 22   CREATININE mg/dL 0 66   CALCIUM mg/dL 9 4   EGFR ml/min/1 73sq m 92   , Vitamin B12:   , HgBA1C:   , TSH:   , Coagulation:   Results from last 7 days   Lab Units 23  1403   INR  1 14   , Lipid Profile:   , Ammonia:   , Urinalysis:       Invalid input(s): URIBILINOGEN  Imaging Studies: I have personally reviewed pertinent films in PACS   CTA stroke alert (head/neck)   Final Result by Konrad Llanos DO ( 1931)      Once again identified is a severe high-grade stenosis at the origin of the left internal carotid artery in this patient with recently attempted carotid endarterectomy  This appears similar to the prior examination    Above the internal carotid artery    bulb the vessel is normal in caliber with the exception of mild narrowing of the periophthalmic segment intracranially  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  Postoperative change is seen within the left neck including small low density collection at the level of the bifurcation and tracking inferiorly, within the carotid space anterior to the common carotid artery may represent small postoperative hematoma  This does not result in significant mass effect upon the adjacent structures  Findings were directly discussed with Scarlet Ramírez at 2:18 PM                            Workstation performed: [id number]         CT stroke alert brain   Final Result by Konrad Resendez DO (02/20 1425)      No acute intracranial abnormality  Findings were directly discussed with Scarlet Ramírez at [facility] PM       Workstation performed: [id number]             EKG, Pathology, and Other Studies: I have personally reviewed pertinent reports  VTE Prophylaxis: Eliquis, no SCD's yet due to acuity of condition/stroke alert  Code Status: Prior    Total Critical Care time spent 45 minutes

## 2023-02-20 NOTE — ED PROVIDER NOTES
History  Chief Complaint   Patient presents with   • High Blood Pressure     No CP no SOB no HA   • CVA/TIA-like Symptoms     Onset 1130 am     HPI  59-year-old female with past medical history of CVA, atherosclerosis of the carotid arteries, documented left partial facial nerve palsy presents today with a complaint of high blood pressure and weakness/numbness  Patient states that she noted she had high blood pressure earlier in the day in the [facility] systolic became concerned  Patient also noticed that she had transient left-sided weakness and new left-sided numbness and tingling and change in the sensation of her left-sided facial droop  Patient states that she feels her left-sided facial droop got worse in the left side of her face became more numb  Patient thinks all the symptoms started around 10:30 AM however she is a poor historian and cannot clearly give exact times and also states that she feels like the symptoms may have gone on before this time yesterday as well  She also endorses transient nausea, headache yesterday which is resolved, off-and-on chest pain, and chronic lower back pain and left leg pain  Denies any fever, chills  Prior to Admission Medications   Prescriptions Last Dose Informant Patient Reported? Taking?    Ascorbic Acid (vitamin C) 1000 MG tablet 2/19/2023  Yes Yes   Sig: Take 1,000 mg by mouth daily   Biotin w/ Vitamins C & E (HAIR/SKIN/NAILS PO) 2/19/2023  Yes Yes   Sig: Take by mouth   Cholecalciferol 25 MCG (1000 UT) tablet 2/19/2023  Yes Yes   Sig: Take 1,000 Units by mouth daily   Docusate Sodium (COLACE PO) 2/19/2023  Yes Yes   Sig: Take by mouth daily at bedtime   Evolocumab 140 MG/ML SOAJ 2/19/2023  No Yes   Sig: Inject 1 mL (140 mg total) under the skin every 14 (fourteen) days   Galcanezumab-gnlm 120 MG/ML SOAJ Not Taking  No No   Sig: Inject 120 mg under the skin every 30 (thirty) days   Patient not taking: Reported on 2/20/2023   Pauline, Zingiber officinalis, (PAULINE PO) More than a month  Yes No   Sig: Take 1 tablet by mouth if needed   LORazepam (ATIVAN) 1 mg tablet 2/20/2023  No Yes   Sig: Take 1 tablet (1 mg total) by mouth every 6 (six) hours as needed for anxiety   MULTIPLE VITAMIN PO   Yes No   Sig: Take by mouth   Probiotic Product (PROBIOTIC-10 PO)   Yes No   Sig: Take 1 tablet by mouth in the morning   Turmeric 500 MG CAPS 2/19/2023  Yes Yes   Zinc 100 MG TABS 2/19/2023  Yes Yes   Sig: Take by mouth daily   apixaban (Eliquis) 5 mg 2/20/2023  No Yes   Sig: Take 1 tablet (5 mg total) by mouth 2 (two) times a day   aspirin 81 mg chewable tablet 2/20/2023  No Yes   Sig: Chew 1 tablet (81 mg total) daily   butalbital-acetaminophen-caffeine (FIORICET,ESGIC) -40 mg per tablet Past Week  No Yes   Sig: Take 1 tablet by mouth every 6 (six) hours as needed for headaches   chlordiazepoxide-clidinium (LIBRAX) 5-2 5 mg per capsule   Yes Yes   Sig: Take 1 capsule by mouth 2 (two) times a day as needed for indigestion or cramping   clotrimazole-betamethasone (LOTRISONE) 1-0 05 % cream 2/19/2023  Yes Yes   diltiazem (DILACOR XR) 120 MG 24 hr capsule 2/20/2023  No Yes   Sig: One tablet daily as needed for palpitations   famotidine (PEPCID) 20 mg tablet 2/19/2023  Yes Yes   Sig: Take 20 mg by mouth daily at bedtime   fexofenadine (ALLEGRA) 180 MG tablet 2/20/2023  Yes Yes   Sig: Take 180 mg by mouth daily   fluticasone-vilanterol (BREO ELLIPTA) 200-25 MCG/INH inhaler 2/19/2023  No Yes   Sig: Inhale 1 puff daily Rinse mouth after use     furosemide (LASIX) 40 mg tablet 2/19/2023  No Yes   Sig: Take 1 tablet (40 mg total) by mouth daily   hydrOXYzine HCL (ATARAX) 25 mg tablet 2/19/2023  No Yes   Sig: Take 2 tablets (50 mg total) by mouth daily at bedtime   metoprolol succinate (TOPROL-XL) 100 mg 24 hr tablet 2/20/2023  No Yes   Sig: TAKE ONE TABLET BY MOUTH TWICE DAILY   montelukast (SINGULAIR) 10 mg tablet 2/19/2023  No Yes   Sig: TAKE ONE TABLET BY MOUTH DAILY AT BEDTIME   naloxone (NARCAN) 4 mg/0 1 mL nasal spray   No No   Sig: Administer 1 spray into a nostril  If no response after 2-3 minutes, give another dose in the other nostril using a new spray     omeprazole (PriLOSEC) 40 MG capsule 2/20/2023  No Yes   Sig: Take 1 capsule (40 mg total) by mouth daily   ondansetron (Zofran ODT) 4 mg disintegrating tablet 2/20/2023  No Yes   Sig: Take 1 tablet (4 mg total) by mouth every 6 (six) hours as needed for nausea or vomiting   phenazopyridine (PYRIDIUM) 200 mg tablet 2/20/2023  No Yes   Sig: Take 1 tablet (200 mg total) by mouth 3 (three) times a day as needed for bladder spasms   spironolactone (ALDACTONE) 25 mg tablet 2/20/2023  No Yes   Sig: Take 1 tablet (25 mg total) by mouth 2 (two) times a day   traMADol (Ultram) 50 mg tablet 2/20/2023  No Yes   Sig: Take 2 tablets (100 mg total) by mouth every 8 (eight) hours as needed for moderate pain      Facility-Administered Medications Last Administration Doses Remaining   cyanocobalamin injection 1,000 mcg 2/16/2023  2:16 PM    cyanocobalamin injection 1,000 mcg 11/28/2022 10:17 AM           Past Medical History:   Diagnosis Date   • A-fib ([facility] ) 03/2020   • Allergic    • Anxiety    • Arthritis    • Asthma    • Back pain     and muscle pain   • Bruises easily    • Chronic pain disorder     Interstitial pain   • Colon polyp    • Dental bridge present    • Depression    • Eczema    • GERD (gastroesophageal reflux disease)    • Heart murmur    • History of blood clots     per pt "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"   • History of pneumonia    • Hives    • Hyperlipidemia    • Hypertension    • Infertility, female    • Interstitial cystitis    • Migraine     sees neurologist   • Motion sickness    • Palpitations    • PONV (postoperative nausea and vomiting)    • Psychiatric disorder    • TIA (transient ischemic attack) 09/2022    per pt --"had MRI and 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"   • Urinary incontinence     wears Pads   • Wears glasses        Past Surgical History:   Procedure Laterality Date   • COLONOSCOPY     • DILATION AND CURETTAGE OF UTERUS     • EGD     • EXPLORATORY LAPAROTOMY      for infertility   • HAND SURGERY      Hand excision of tendon cyst   • MI LAPAROSCOPIC APPENDECTOMY N/A 2022    Procedure: APPENDECTOMY LAPAROSCOPIC;  Surgeon: Nida Mancini MD;  Location: [facility];  Service: General   • MI TEAEC W/PATCH GRF CAROTID VERTB Víctor Left 2023    Procedure: EXPLORATION OF LEFT CAROTID ARTERY; ABORTED ENDARTERECTOMY ARTERY CAROTID;  Surgeon: Pauline Garcia DO;  Location: [facility];  Service: Vascular   • SUPERIOR MESTENTERIC ARTERY STENT     • WISDOM TOOTH EXTRACTION         Family History   Problem Relation Age of Onset   • Lung cancer Mother 61   • Brain cancer Mother 61   • Diabetes Father    • Depression Father    • Other Father         septic   • Allergies Sister    • Hashimoto's thyroiditis Sister    • Abdominal aortic aneurysm Sister    • Diabetes Sister    • No Known Problems Sister    • No Known Problems Maternal Grandmother    • No Known Problems Maternal Grandfather    • No Known Problems Paternal Grandmother    • No Known Problems Paternal Grandfather    • Hodgkin's lymphoma Brother    • No Known Problems Brother    • No Known Problems Maternal Aunt    • Diabetes Other      I have reviewed and agree with the history as documented      E-Cigarette/Vaping   • E-Cigarette Use Never User      E-Cigarette/Vaping Substances   • Nicotine No    • THC No    • CBD No    • Flavoring No    • Other No    • Unknown No      Social History     Tobacco Use   • Smoking status: Former     Packs/day: 0 50     Years: 10 00     Pack years: 5 00     Types: Cigarettes     Quit date: 2019     Years since quittin 1     Passive exposure: Never   • Smokeless tobacco: Never   Vaping Use   • Vaping Use: Never used   Substance Use Topics   • Alcohol use: Never   • Drug use: No        Review of Systems Constitutional: Negative for chills and fever  HENT: Negative for congestion, ear pain, rhinorrhea and sore throat  Eyes: Negative for pain  Respiratory: Negative for apnea, cough, choking, chest tightness, shortness of breath, wheezing and stridor  Cardiovascular: Positive for chest pain  Negative for palpitations and leg swelling  Gastrointestinal: Negative for abdominal pain, constipation, diarrhea, nausea and vomiting  Genitourinary: Negative for hematuria  Musculoskeletal: Negative for arthralgias and back pain  Skin: Negative for rash and wound  Neurological: Positive for facial asymmetry, weakness and numbness  Negative for dizziness  Psychiatric/Behavioral: Negative for agitation and hallucinations  All other systems reviewed and are negative  Physical Exam  ED Triage Vitals   Temperature Pulse Respirations Blood Pressure SpO2   02/20/23 1344 02/20/23 1344 02/20/23 1344 02/20/23 1344 02/20/23 1344   98 1 °F (36 7 °C) 92 16 162/72 95 %      Temp Source Heart Rate Source Patient Position - Orthostatic VS BP Location FiO2 (%)   02/20/23 1930 -- 02/20/23 1930 02/20/23 1344 --   Temporal  Sitting Right arm       Pain Score       02/20/23 1630       6             Orthostatic Vital Signs  Vitals:    02/21/23 1604 02/21/23 1857 02/21/23 2316 02/22/23 0217   BP: 129/76 125/70 113/55 130/70   Pulse: 67 59 58 59   Patient Position - Orthostatic VS: Sitting Lying Lying Lying       Physical Exam  Vitals reviewed  Constitutional:       General: She is not in acute distress  Appearance: She is well-developed  HENT:      Head: Normocephalic and atraumatic  Right Ear: External ear normal       Left Ear: External ear normal       Nose: Nose normal  No congestion or rhinorrhea  Mouth/Throat:      Mouth: Mucous membranes are moist       Pharynx: No oropharyngeal exudate or posterior oropharyngeal erythema        Comments: Asymmetric smile but preserved nasolabial creases  Eyes: General:         Right eye: No discharge  Left eye: No discharge  Extraocular Movements: Extraocular movements intact  Conjunctiva/sclera: Conjunctivae normal       Pupils: Pupils are equal, round, and reactive to light  Cardiovascular:      Rate and Rhythm: Normal rate and regular rhythm  Pulses: Normal pulses  Heart sounds: Normal heart sounds  Pulmonary:      Effort: Pulmonary effort is normal  No respiratory distress  Breath sounds: Normal breath sounds  No wheezing or rales  Abdominal:      Palpations: Abdomen is soft  Tenderness: There is no abdominal tenderness  Musculoskeletal:         General: No tenderness  Normal range of motion  Cervical back: Normal range of motion and neck supple  No rigidity  Skin:     General: Skin is warm  Findings: No erythema or rash  Neurological:      General: No focal deficit present  Mental Status: She is alert and oriented to person, place, and time  Cranial Nerves: No cranial nerve deficit  Sensory: Sensory deficit present  Motor: No weakness        Coordination: Coordination normal    Psychiatric:         Mood and Affect: Mood normal          ED Medications  Medications   apixaban (ELIQUIS) tablet 5 mg (5 mg Oral Given 2/21/23 1836)   Evolocumab SOAJ 140 mg (140 mg Subcutaneous Not Given 2/20/23 2059)   metoprolol succinate (TOPROL-XL) 24 hr tablet 100 mg (100 mg Oral Given 2/21/23 1835)   hydrOXYzine HCL (ATARAX) tablet 50 mg (50 mg Oral Given 2/21/23 2230)   Galcanezumab-gnlm SOAJ 120 mg (120 mg Subcutaneous Refused 2/20/23 2059)   furosemide (LASIX) tablet 40 mg (40 mg Oral Given 2/21/23 0832)   phenazopyridine (PYRIDIUM) tablet 200 mg (has no administration in time range)   montelukast (SINGULAIR) tablet 10 mg (10 mg Oral Given 2/21/23 2230)   ascorbic acid (VITAMIN C) tablet 1,000 mg (1,000 mg Oral Given 2/21/23 0832)   famotidine (PEPCID) tablet 20 mg (20 mg Oral Given 2/21/23 2230) butalbital-acetaminophen-caffeine (FIORICET,ESGIC) -40 mg per tablet 1 tablet (has no administration in time range)   LORazepam (ATIVAN) tablet 1 mg (1 mg Oral Given 2/22/23 0452)   spironolactone (ALDACTONE) tablet 25 mg (25 mg Oral Given 2/21/23 1836)   traMADol (ULTRAM) tablet 100 mg (100 mg Oral Given 2/22/23 0452)   pantoprazole (PROTONIX) EC tablet 40 mg (40 mg Oral Given 2/21/23 0518)   fluticasone-vilanterol 200-25 mcg/actuation 1 puff (1 puff Inhalation Given 2/21/23 0837)   atorvastatin (LIPITOR) tablet 40 mg (40 mg Oral Refused 2/21/23 1839)   aspirin chewable tablet 81 mg (81 mg Oral Given 2/21/23 0832)   acetaminophen (TYLENOL) tablet 650 mg (650 mg Oral Given 2/21/23 1318)   dicyclomine (BENTYL) capsule 10 mg (10 mg Oral Refused 2/21/23 2231)   ondansetron (ZOFRAN-ODT) dispersible tablet 4 mg (4 mg Oral Given 2/22/23 0452)   iohexol (OMNIPAQUE) 350 MG/ML injection (SINGLE-DOSE) 85 mL (85 mL Intravenous Given 2/20/23 1406)       Diagnostic Studies  Results Reviewed     Procedure Component Value Units Date/Time    Lipid Panel with Direct LDL reflex [[id number]]  (Abnormal) Collected: 02/21/23 0526    Lab Status: Final result Specimen: Blood from Arm, Right Updated: 02/21/23 0622     Cholesterol 249 mg/dL      Triglycerides 322 mg/dL      HDL, Direct 46 mg/dL      LDL Calculated 139 mg/dL     Hemoglobin A1c w/EAG Estimation [[id number]] Collected: 02/21/23 0526    Lab Status:  In process Specimen: Blood from Arm, Right Updated: 02/21/23 0552    HS Troponin I 4hr [[id number]]  (Normal) Collected: 02/20/23 1826    Lab Status: Final result Specimen: Blood from Arm, Right Updated: 02/20/23 1900     hs TnI 4hr 8 ng/L      Delta 4hr hsTnI 3 ng/L     FLU/RSV/COVID - if FLU/RSV clinically relevant [[id number]]  (Normal) Collected: 02/20/23 3967    Lab Status: Final result Specimen: Nares from Nose Updated: 02/20/23 7020     SARS-CoV-2 Negative     INFLUENZA A PCR Negative     INFLUENZA B PCR Negative     RSV PCR Negative    Narrative:      FOR PEDIATRIC PATIENTS - copy/paste COVID Guidelines URL to browser: [email or web address]    SARS-CoV-2 assay is a Nucleic Acid Amplification assay intended for the  qualitative detection of nucleic acid from SARS-CoV-2 in nasopharyngeal  swabs  Results are for the presumptive identification of SARS-CoV-2 RNA  Positive results are indicative of infection with SARS-CoV-2, the virus  causing COVID-19, but do not rule out bacterial infection or co-infection  with other viruses  Laboratories within the [place] and its  territories are required to report all positive results to the appropriate  public health authorities  Negative results do not preclude SARS-CoV-2  infection and should not be used as the sole basis for treatment or other  patient management decisions  Negative results must be combined with  clinical observations, patient history, and epidemiological information  This test has not been FDA cleared or approved  This test has been authorized by FDA under an Emergency Use Authorization  (EUA)  This test is only authorized for the duration of time the  declaration that circumstances exist justifying the authorization of the  emergency use of an in vitro diagnostic tests for detection of SARS-CoV-2  virus and/or diagnosis of COVID-19 infection under section 564(b)(1) of  the Act, 21 U  S C  348BEI-5(T)(5), unless the authorization is terminated  or revoked sooner  The test has been validated but independent review by FDA  and CLIA is pending  Test performed using [employer] GeneXpert: This RT-PCR assay targets N2,  a region unique to SARS-CoV-2  A conserved region in the E-gene was chosen  for pan-Sarbecovirus detection which includes SARS-CoV-2  According to CMS-2020-01-R, this platform meets the definition of high-throughput technology      HS Troponin I 2hr [[id number]]  (Normal) Collected: 02/20/23 3494 Lab Status: Final result Specimen: Blood from Arm, Left Updated: 02/20/23 1640     hs TnI 2hr 6 ng/L      Delta 2hr hsTnI 1 ng/L     HS Troponin 0hr (reflex protocol) [[id number]]  (Normal) Collected: 02/20/23 1403    Lab Status: Final result Specimen: Blood from Arm, Left Updated: 02/20/23 1435     hs TnI 0hr 5 ng/L     Protime-INR [[id number]]  (Abnormal) Collected: 02/20/23 1403    Lab Status: Final result Specimen: Blood from Arm, Left Updated: 02/20/23 1432     Protime 14 6 seconds      INR 1 14    APTT [[id number]]  (Normal) Collected: 02/20/23 1403    Lab Status: Final result Specimen: Blood from Arm, Left Updated: 02/20/23 1432     PTT 32 seconds     Basic metabolic panel [[id number]] Collected: 02/20/23 1403    Lab Status: Final result Specimen: Blood from Arm, Left Updated: 02/20/23 1431     Sodium 135 mmol/L      Potassium 4 7 mmol/L      Chloride 101 mmol/L      CO2 26 mmol/L      ANION GAP 8 mmol/L      BUN 22 mg/dL      Creatinine 0 66 mg/dL      Glucose 94 mg/dL      Calcium 9 4 mg/dL      eGFR 92 ml/min/1 73sq m     Narrative:      [place] guidelines for Chronic Kidney Disease (CKD):   •  Stage 1 with normal or high GFR (GFR > 90 mL/min/1 73 square meters)  •  Stage 2 Mild CKD (GFR = 60-89 mL/min/1 73 square meters)  •  Stage 3A Moderate CKD (GFR = 45-59 mL/min/1 73 square meters)  •  Stage 3B Moderate CKD (GFR = 30-44 mL/min/1 73 square meters)  •  Stage 4 Severe CKD (GFR = 15-29 mL/min/1 73 square meters)  •  Stage 5 End Stage CKD (GFR <15 mL/min/1 73 square meters)  Note: GFR calculation is accurate only with a steady state creatinine    CBC and Platelet [[id number]]  (Normal) Collected: 02/20/23 1403    Lab Status: Final result Specimen: Blood from Arm, Left Updated: 02/20/23 1409     WBC 6 62 Thousand/uL      RBC 4 22 Million/uL      Hemoglobin 12 7 g/dL      Hematocrit 37 9 %      MCV 90 fL      MCH 30 1 pg      MCHC 33 5 g/dL      RDW 12 2 %      Platelets 303 Thousands/uL      MPV 9 0 fL     Fingerstick Glucose (POCT) [[id number]]  (Normal) Collected: 02/20/23 1350    Lab Status: Final result Updated: 02/20/23 1351     POC Glucose 93 mg/dl                  CTA stroke alert (head/neck)   Final Result by Konrad Morgan DO (02/20 1424)      Once again identified is a severe high-grade stenosis at the origin of the left internal carotid artery in this patient with recently attempted carotid endarterectomy  This appears similar to the prior examination  Above the internal carotid artery    bulb the vessel is normal in caliber with the exception of mild narrowing of the periophthalmic segment intracranially  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  Postoperative change is seen within the left neck including small low density collection at the level of the bifurcation and tracking inferiorly, within the carotid space anterior to the common carotid artery may represent small postoperative hematoma  This does not result in significant mass effect upon the adjacent structures  Findings were directly discussed with Vamshi Houston at 2:18 PM                            Workstation performed: [id number]         CT stroke alert brain   Final Result by Konrad Morgan DO (02/20 1425)      No acute intracranial abnormality        Findings were directly discussed with Vamshi Houston at 2:18 PM       Workstation performed: [id number]         MRI brain wo contrast    (Results Pending)         Procedures  ECG 12 Lead Documentation Only    Date/Time: 2/20/2023 2:50 PM  Performed by: Perico Pascual DO  Authorized by: Perico Pascual DO     Indications / Diagnosis:  Numbness  ECG reviewed by me, the ED Provider: yes    Patient location:  ED  Interpretation:     Interpretation: abnormal    Rate:     ECG rate:  61    ECG rate assessment: normal    Rhythm:     Rhythm: sinus rhythm      Rhythm comment:  Sinus arrythmia   Ectopy: Ectopy: PAC    QRS:     QRS axis:  Normal  Conduction:     Conduction: normal    ST segments:     ST segments:  Normal  T waves:     T waves: normal            ED Course             HEART Risk Score    Flowsheet Row Most Recent Value   Heart Score Risk Calculator    History 0 Filed at: 02/20/2023 1452   ECG 0 Filed at: 02/20/2023 1452   Age 2 Filed at: 02/20/2023 1452   Risk Factors 2 Filed at: 02/20/2023 1452   Troponin --   HEART Score --                      SBIRT 20yo+    Flowsheet Row Most Recent Value   SBIRT (25 yo +)    In order to provide better care to our patients, we are screening all of our patients for alcohol and drug use  Would it be okay to ask you these screening questions? No Filed at: 02/20/2023 1502                Medical Decision Making  15-year-old female with past medical history of CVA, atherosclerosis of the carotid arteries, documented left partial facial nerve palsy presents today with a complaint of high blood pressure and weakness/numbness  Facial palsy, hypertension, chest pain    Patient presents with unclear seemingly worsening of chronic neurological symptoms  Stroke alert is called  CTA head and neck and CT head did not show any acute CVA  Dr Austen Rivas evaluates the patient status post CT scan and on his neurological examination seems the patient cleared from neuro standpoint and states MRI is not necessary, but recommends neurochecks  Chest pain- troponin negative-ECG shows sinus arrhythmia- sinus rhythm with multiple PACs    Pt is admitted to medicine  Numbness: self-limited or minor problem  Partial facial nerve palsy: self-limited or minor problem  Amount and/or Complexity of Data Reviewed  Labs: ordered  Radiology: ordered  Risk  Prescription drug management  Decision regarding hospitalization              Disposition  Final diagnoses:   Partial facial nerve palsy   Numbness     Time reflects when diagnosis was documented in both MDM as applicable and the Disposition within this note     Time User Action Codes Description Comment    2/20/2023  2:00 PM Lilli Hein Add [G51 0] Partial facial nerve palsy     2/20/2023  3:51 PM Geetha Jacinto Add [R20 0] Numbness     2/20/2023  5:33 PM Anayeli Banda Add [R53 82] Chronic fatigue     2/20/2023  5:33 PM Anayeli Banda Add [E53 8] Vitamin B12 deficiency     2/21/2023  7:31 AM Moises Baker Add [I65 22] Stenosis of left carotid artery     2/21/2023  2:24 PM Christie Mere Add [R20 2] Paresthesias       ED Disposition     ED Disposition   Admit    Condition   Stable    Date/Time   Mon Feb 20, 2023  3:51 PM    Comment   --         Follow-up Information     Follow up With Specialties Details Why Contact Info Additional [facility] Follow up The office will call you to schedule a follow-up appointment  If you do not hear from them within 1 week after discharge, please call them to schedule   [address] [address]  [address] [place] [address], [address] [facility] [place], [place], [address]    [facility]/Hospice  Follow up  [address] [place] [address]  [phone]             Current Discharge Medication List      CONTINUE these medications which have NOT CHANGED    Details   apixaban (Eliquis) 5 mg Take 1 tablet (5 mg total) by mouth 2 (two) times a day  Qty: 180 tablet, Refills: 5    Associated Diagnoses: Paroxysmal atrial fibrillation (HCC)      Ascorbic Acid (vitamin C) 1000 MG tablet Take 1,000 mg by mouth daily      aspirin 81 mg chewable tablet Chew 1 tablet (81 mg total) daily  Refills: 0    Associated Diagnoses: Carotid artery stenosis      Biotin w/ Vitamins C & E (HAIR/SKIN/NAILS PO) Take by mouth      butalbital-acetaminophen-caffeine (FIORICET,ESGIC) -40 mg per tablet Take 1 tablet by mouth every 6 (six) hours as needed for headaches  Qty: 90 tablet, Refills: 0    Associated Diagnoses: Migraine without aura and with status migrainosus, not intractable      chlordiazepoxide-clidinium (LIBRAX) 5-2 5 mg per capsule Take 1 capsule by mouth 2 (two) times a day as needed for indigestion or cramping      Cholecalciferol 25 MCG (1000 UT) tablet Take 1,000 Units by mouth daily      clotrimazole-betamethasone (LOTRISONE) 1-0 05 % cream       diltiazem (DILACOR XR) 120 MG 24 hr capsule One tablet daily as needed for palpitations  Qty: 30 capsule, Refills: 5    Associated Diagnoses: Palpitations      Docusate Sodium (COLACE PO) Take by mouth daily at bedtime      Evolocumab 140 MG/ML SOAJ Inject 1 mL (140 mg total) under the skin every 14 (fourteen) days  Qty: 2 mL, Refills: 5    Associated Diagnoses: Other hyperlipidemia      famotidine (PEPCID) 20 mg tablet Take 20 mg by mouth daily at bedtime      fexofenadine (ALLEGRA) 180 MG tablet Take 180 mg by mouth daily      fluticasone-vilanterol (BREO ELLIPTA) 200-25 MCG/INH inhaler Inhale 1 puff daily Rinse mouth after use    Qty: 3 Inhaler, Refills: 3    Associated Diagnoses: Wheezing; Acute upper respiratory infection      furosemide (LASIX) 40 mg tablet Take 1 tablet (40 mg total) by mouth daily  Qty: 90 tablet, Refills: 0    Associated Diagnoses: Benign essential hypertension; Essential hypertension      hydrOXYzine HCL (ATARAX) 25 mg tablet Take 2 tablets (50 mg total) by mouth daily at bedtime  Qty: 90 tablet, Refills: 3    Associated Diagnoses: Interstitial cystitis      LORazepam (ATIVAN) 1 mg tablet Take 1 tablet (1 mg total) by mouth every 6 (six) hours as needed for anxiety  Qty: 90 tablet, Refills: 0    Associated Diagnoses: Anxiety      metoprolol succinate (TOPROL-XL) 100 mg 24 hr tablet TAKE ONE TABLET BY MOUTH TWICE DAILY  Qty: 180 tablet, Refills: 0    Associated Diagnoses: Essential hypertension      montelukast (SINGULAIR) 10 mg tablet TAKE ONE TABLET BY MOUTH DAILY AT BEDTIME  Qty: 90 tablet, Refills: 0    Associated Diagnoses: Seasonal allergic rhinitis due to pollen      omeprazole (PriLOSEC) 40 MG capsule Take 1 capsule (40 mg total) by mouth daily  Qty: 309 capsule, Refills: 4    Associated Diagnoses: Gastroesophageal reflux disease without esophagitis      ondansetron (Zofran ODT) 4 mg disintegrating tablet Take 1 tablet (4 mg total) by mouth every 6 (six) hours as needed for nausea or vomiting  Qty: 60 tablet, Refills: 0    Associated Diagnoses: Subcutaneous nodule      phenazopyridine (PYRIDIUM) 200 mg tablet Take 1 tablet (200 mg total) by mouth 3 (three) times a day as needed for bladder spasms  Qty: 30 tablet, Refills: 1    Associated Diagnoses: Interstitial cystitis; Chronic bladder pain      spironolactone (ALDACTONE) 25 mg tablet Take 1 tablet (25 mg total) by mouth 2 (two) times a day  Qty: 180 tablet, Refills: 0    Associated Diagnoses: Benign essential hypertension      traMADol (Ultram) 50 mg tablet Take 2 tablets (100 mg total) by mouth every 8 (eight) hours as needed for moderate pain  Qty: 60 tablet, Refills: 0    Associated Diagnoses: Neck pain      Turmeric 500 MG CAPS       Zinc 100 MG TABS Take by mouth daily      Galcanezumab-gnlm 120 MG/ML SOAJ Inject 120 mg under the skin every 30 (thirty) days  Qty: 1 mL, Refills: 11    Associated Diagnoses: Chronic migraine without aura without status migrainosus, not intractable      Ginger, Zingiber officinalis, (GINGER PO) Take 1 tablet by mouth if needed      MULTIPLE VITAMIN PO Take by mouth      naloxone (NARCAN) 4 mg/0 1 mL nasal spray Administer 1 spray into a nostril  If no response after 2-3 minutes, give another dose in the other nostril using a new spray    Qty: 1 each, Refills: 1    Associated Diagnoses: 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HCC)      Probiotic Product (PROBIOTIC-10 PO) Take 1 tablet by mouth in the morning           Outpatient Discharge Orders   EMG 2 Limb Upper Extremity   Standing Status: Future Standing Exp  Date: 02/21/24       PDMP Review       Value Time User    PDMP Reviewed  Yes 2/7/2023  6:04 PM Ben Levin DO           ED Provider  Attending physically available and evaluated Shamika Jackeline IQBAL managed the patient along with the ED Attending      Electronically Signed by         Maribel Savage DO  02/22/23 7564

## 2023-02-20 NOTE — H&P
[facility]  H&P- Nita Lozano 1960, 70 y o  female MRN: [id number]  Unit/Bed#: ED-09 Encounter: [id number]  Primary Care Provider: David Nowak,    Date and time admitted to hospital: 2/20/2023  1:38 PM    * Facial droop  Assessment & Plan  Left facial droop    She has had facial droop after recent attempted but aborted left carotid CEA in which he had lower mandibular nerve palsy  She had some facial droop on the left ever since  But today Occupational Therapy will not see her and states it would looked worse so they sent her into the emergency room  We will do a stroke work-up  MRI brain  Neurology consultation  Aspirin  Statin    Carotid artery stenosis  Assessment & Plan  Vascular surgery attempted a left carotid endarterectomy for an asymptomatic greater than 70% left carotid in January 2023    However she had an anatomical defect and had to abort the surgery    She has had some's lower mandibular nerve palsy since    Hyperlipidemia  Assessment & Plan  Check fasting profile  Continue statin  Chief Complaint:   Left facial droop  Acute on chronic      History of Present Illness:    Nita Lozano is a 70 y o  female who presents with   Acute on chronic left facial droop  Patient had asymptomatic left internal carotid stenosis noted during a previous stroke work-up  She went for elective left carotid endarterectomy repair in January 2023  The surgery needed to be aborted because she had abnormal anatomy  After the surgery she did have some mandibular nerve palsy and left facial droop  It is improving but still present  She has had that for the last month  Today Occupational Therapy came to the house and felt it looked much worse that she was talking worse than normal   No known onset of the symptoms per the patient  She did not notice anything different  No headache  No other sided weakness  No difficulty swallowing  Speech is improved         Review of Systems:    Review of Systems   Constitutional: Negative for chills and fever  HENT: Negative for ear pain and sore throat  Eyes: Negative for pain and visual disturbance  Respiratory: Negative for cough and shortness of breath  Cardiovascular: Negative for chest pain and palpitations  Gastrointestinal: Negative for abdominal pain and vomiting  Genitourinary: Negative for dysuria and hematuria  Musculoskeletal: Negative for arthralgias and back pain  Skin: Negative for color change and rash  Neurological: Negative for seizures and syncope  Left facial droop   All other systems reviewed and are negative  Past Medical and Surgical History:     Past Medical History:   Diagnosis Date   • [facility]   • Allergic    • Anxiety    • Arthritis    • Asthma    • Back pain     and muscle pain   • Bruises easily    • Chronic pain disorder     Interstitial pain   • Colon polyp    • Dental bridge present    • Depression    • Eczema    • GERD (gastroesophageal reflux disease)    • Heart murmur    • History of blood clots     per pt "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"   • History of pneumonia    • Hives    • Hyperlipidemia    • Hypertension    • Infertility, female    • Interstitial cystitis    • Migraine     sees neurologist   • Motion sickness    • Palpitations    • PONV (postoperative nausea and vomiting)    • Psychiatric disorder    • TIA (transient ischemic attack) 09/2022    per pt --"had MRI and 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"   • Urinary incontinence     wears Pads   • Wears glasses        Past Surgical History:   Procedure Laterality Date   • COLONOSCOPY     • DILATION AND CURETTAGE OF UTERUS     • EGD     • EXPLORATORY LAPAROTOMY      for infertility   • HAND SURGERY      Hand excision of tendon cyst   • HI LAPAROSCOPIC APPENDECTOMY N/A 05/03/2022    Procedure: APPENDECTOMY LAPAROSCOPIC;  Surgeon:  Kelly Novoa MD;  Location: [facility];  Service: General   • MO TEAEC W/PATCH GRF CAROTID VERTB SUBCLAV NECK INC Left 1/31/2023    Procedure: EXPLORATION OF LEFT CAROTID ARTERY; ABORTED ENDARTERECTOMY ARTERY CAROTID;  Surgeon: Ba Galvin DO;  Location: [facility];  Service: Vascular   • SUPERIOR MESTENTERIC ARTERY STENT     • WISDOM TOOTH EXTRACTION           Home Medications:    Prior to Admission medications    Medication Sig Start Date End Date Taking?  Authorizing Provider   apixaban (Eliquis) 5 mg Take 1 tablet (5 mg total) by mouth 2 (two) times a day 4/13/22  Yes Jami Richard DO   Ascorbic Acid (vitamin C) 1000 MG tablet Take 1,000 mg by mouth daily   Yes Historical Provider, MD   aspirin 81 mg chewable tablet Chew 1 tablet (81 mg total) daily 9/4/22  Yes Beto Levy MD   Biotin w/ Vitamins C & E (HAIR/SKIN/NAILS PO) Take by mouth   Yes Historical Provider, MD   butalbital-acetaminophen-caffeine (FIORICET,ESGIC) -40 mg per tablet Take 1 tablet by mouth every 6 (six) hours as needed for headaches 1/30/23  Yes Royal Phlegm, DO   chlordiazepoxide-clidinium (LIBRAX) 5-2 5 mg per capsule Take 1 capsule by mouth 2 (two) times a day as needed for indigestion or cramping   Yes Historical Provider, MD   Cholecalciferol 25 MCG (1000 UT) tablet Take 1,000 Units by mouth daily   Yes Historical Provider, MD   clotrimazole-betamethasone (Jane ) 1-0 05 % cream  3/2/22  Yes Historical Provider, MD   diltiazem (DILACOR XR) 120 MG 24 hr capsule One tablet daily as needed for palpitations 10/25/22  Yes Jami Lynn DO   Docusate Sodium (COLACE PO) Take by mouth daily at bedtime   Yes Historical Provider, MD   Evolocumab 140 MG/ML SOAJ Inject 1 mL (140 mg total) under the skin every 14 (fourteen) days 10/11/22  Yes Jami Lynn DO   famotidine (PEPCID) 20 mg tablet Take 20 mg by mouth daily at bedtime   Yes Historical Provider, MD   fexofenadine (ALLEGRA) 180 MG tablet Take 180 mg by mouth daily   Yes Historical Provider, MD   fluticasone-vilanterol (BREO ELLIPTA) 200-25 MCG/INH inhaler Inhale 1 puff daily Rinse mouth after use   3/1/21  Yes Lita Schooling, DO   furosemide (LASIX) 40 mg tablet Take 1 tablet (40 mg total) by mouth daily 12/12/22  Yes Lita Schooling, DO   hydrOXYzine HCL (ATARAX) 25 mg tablet Take 2 tablets (50 mg total) by mouth daily at bedtime 11/30/22  Yes Windy Alexander MD   LORazepam (ATIVAN) 1 mg tablet Take 1 tablet (1 mg total) by mouth every 6 (six) hours as needed for anxiety 2/7/23  Yes Lita Schooling, DO   metoprolol succinate (TOPROL-XL) 100 mg 24 hr tablet TAKE ONE TABLET BY MOUTH TWICE DAILY 11/22/22  Yes Bassam Donnelly Kb, DO   montelukast (SINGULAIR) 10 mg tablet TAKE ONE TABLET BY MOUTH DAILY AT BEDTIME 1/19/23  Yes Lita Schooling, DO   omeprazole (PriLOSEC) 40 MG capsule Take 1 capsule (40 mg total) by mouth daily 10/10/22  Yes uLis Arenas PA-C   ondansetron (Zofran ODT) 4 mg disintegrating tablet Take 1 tablet (4 mg total) by mouth every 6 (six) hours as needed for nausea or vomiting 2/16/23  Yes Jose M Chapman PA-C   phenazopyridine (PYRIDIUM) 200 mg tablet Take 1 tablet (200 mg total) by mouth 3 (three) times a day as needed for bladder spasms 12/28/22  Yes Windy Alexander MD   spironolactone (ALDACTONE) 25 mg tablet Take 1 tablet (25 mg total) by mouth 2 (two) times a day 2/16/23  Yes Lita Schooling, DO   traMADol (Ultram) 50 mg tablet Take 2 tablets (100 mg total) by mouth every 8 (eight) hours as needed for moderate pain 2/17/23  Yes Lita Schooling, DO   Turmeric 500 MG CAPS    Yes Historical Provider, MD   Zinc 100 MG TABS Take by mouth daily   Yes Historical Provider, MD   Galcanezumab-gnlm 120 MG/ML SOAJ Inject 120 mg under the skin every 30 (thirty) days  Patient not taking: Reported on 2/20/2023 11/30/22   Jose Or, MD   Ginger, Zingiber officinalis, (GINGER PO) Take 1 tablet by mouth if needed    Historical Provider, MD   MULTIPLE VITAMIN PO Take by mouth    Historical Provider, MD   naloxone (NARCAN) 4 mg/0 1 mL nasal spray Administer 1 spray into a nostril  If no response after 2-3 minutes, give another dose in the other nostril using a new spray  2/10/23   Cheri Anderson, DO   Probiotic Product (PROBIOTIC-10 PO) Take 1 tablet by mouth in the morning    Historical Provider, MD     I have reviewed home medications with patient personally  Allergies: Allergies   Allergen Reactions   • Ace Inhibitors Angioedema and Anaphylaxis     Anaphylaxis   • Benicar [Olmesartan] Angioedema     See Allergy note from 2008  Swollen ankles/legs   • Hydralazine Other (See Comments)     Lip swelling  Flank pain   • Other Anaphylaxis and Other (See Comments)     Other reaction(s): angioadema  Other reaction(s): Other (See Comments)  Preservatives- itching throat closes, hi  Other reaction(s): angioadema  E Z Cat - hives throat closes itching,  Preservatives- itching throat closes, hi  Artificial sweeteners   • Valsartan Angioedema     Lips, face swollen   • Sulfa Antibiotics Hives     stuffiness,itching,hives,throat closing--per pt "sometimes able to take depending on the brand and the filler or possibly preservatives in it"   • Ampicillin Hives     Depends on brand some preservatives can react  Has recently tolerated oral amoxicillin     • Motrin [Ibuprofen] Other (See Comments)     Per pt " told not to take due to A Fib"   • Wound Dressing Adhesive Other (See Comments)     Per pt Adhesive on EKG leads caused redness         Social History:    Substance Use History:   Social History     Substance and Sexual Activity   Alcohol Use Never     Social History     Tobacco Use   Smoking Status Former   • Packs/day: 0 50   • Years: 10 00   • Pack years: 5 00   • Types: Cigarettes   • Quit date:    • Years since quittin 1   • Passive exposure: Never   Smokeless Tobacco Never     Social History     Substance and Sexual Activity   Drug Use No         Family History:    non-contributory      Physical Exam:     Vitals:   Blood Pressure: 137/62 (02/20/23 1730)  Pulse: 56 (02/20/23 1730)  Temperature: 98 1 °F (36 7 °C) (02/20/23 1344)  Respirations: 20 (02/20/23 1730)  Height: 5' 4" (162 6 cm) (02/20/23 1700)  Weight - Scale: 90 2 kg (198 lb 13 7 oz) (02/20/23 1700)  SpO2: 93 % (02/20/23 1730)    Physical Exam  Vitals and nursing note reviewed  HENT:      Head: Normocephalic and atraumatic  Eyes:      Pupils: Pupils are equal, round, and reactive to light  Cardiovascular:      Rate and Rhythm: Normal rate and regular rhythm  Heart sounds: No murmur heard  No friction rub  No gallop  Pulmonary:      Effort: Pulmonary effort is normal       Breath sounds: Normal breath sounds  No wheezing or rales  Abdominal:      General: Bowel sounds are normal       Palpations: Abdomen is soft  Tenderness: There is no abdominal tenderness  Musculoskeletal:      Right lower leg: No edema  Left lower leg: No edema  Neurological:      Comments: Severe left facial droop  Otherwise no focal deficits  Cranial nerves II through XII otherwise intact  5/5 strength all ext             Additional Data:     Lab Results: I have personally reviewed pertinent reports  Results from last 7 days   Lab Units 02/20/23  1403   WBC Thousand/uL 6 62   HEMOGLOBIN g/dL 12 7   HEMATOCRIT % 37 9   PLATELETS Thousands/uL 233     Results from last 7 days   Lab Units 02/20/23  1403   POTASSIUM mmol/L 4 7   CHLORIDE mmol/L 101   CO2 mmol/L 26   BUN mg/dL 22   CREATININE mg/dL 0 66   CALCIUM mg/dL 9 4     Results from last 7 days   Lab Units 02/20/23  1403   INR  1 14     Results from last 7 days   Lab Units 02/20/23  1350   POC GLUCOSE mg/dl 93             Imaging: I have personally reviewed pertinent reports        CTA stroke alert (head/neck)   Final Result by Konrad Agustin DO (02/20 1424)      Once again identified is a severe high-grade stenosis at the origin of the left internal carotid artery in this patient with recently attempted carotid endarterectomy  This appears similar to the prior examination  Above the internal carotid artery    bulb the vessel is normal in caliber with the exception of mild narrowing of the periophthalmic segment intracranially  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  Postoperative change is seen within the left neck including small low density collection at the level of the bifurcation and tracking inferiorly, within the carotid space anterior to the common carotid artery may represent small postoperative hematoma  This does not result in significant mass effect upon the adjacent structures  Findings were directly discussed with Josefina Diaz at 2:18 PM                            Workstation performed: [id number]         CT stroke alert brain   Final Result by Konrad Cintron DO (02/20 1425)      No acute intracranial abnormality  Findings were directly discussed with Josefina Diaz at 2:18 PM       Workstation performed: [id number]         MRI Inpatient Order    (Results Pending)         EKG, Pathology, and Other Studies Reviewed on Admission:   · EKG: Normal sinus rhythm with PVCs  VTE Prophylaxis: Enoxaparin (Lovenox)        Anticipated Length of Stay:  Patient will be admitted on an Inpatient basis with an anticipated length of stay of greater than 2 midnights  Justification for Hospital Stay: Patient is stroke work-up with worsening left facial droop  She needs MRI brain  Neurology consultation  Length of stay will be greater than 2 midnights  Total Time for Visit, including Counseling / Coordination of Care: 45 minutes  Greater than 50% of this total time spent on direct patient counseling and coordination of care  ** Please Note: This note has been constructed using a voice recognition system   **

## 2023-02-20 NOTE — ED NOTES
Pt has notable L facial droop and when shown herself in mirror does not think this is how she usually looks  Also reports 11:30 am when she ate lunch was not able to move food around in her mouth properly which she reported to be new, and overall perceived worsening of L sided weakness   Tani Oh and Yesica Sánchez at bedside for evaluation     Juan Albright RN  02/20/23 [id number]

## 2023-02-20 NOTE — ED NOTES
Charge RN Isaak Gaona made aware pt will be arriving and is on [facility]  02/20/23 2766

## 2023-02-20 NOTE — ASSESSMENT & PLAN NOTE
Vascular surgery attempted a left carotid endarterectomy for an asymptomatic greater than 70% left carotid in January 2023    However she had an anatomical defect and had to abort the surgery    She has had some's lower mandibular nerve palsy since

## 2023-02-20 NOTE — CASE COMMUNICATION
OT visits to be decreased to 1 visit in 7 days the week of  2/12/23 at patient's request   Patient requested OT cancel visit for 2/16/23 due to  not feeing up to having OT visit due to fatigue after having MRI    Patient requested OT resume regular visit pattern the week of 2/19/23

## 2023-02-20 NOTE — ASSESSMENT & PLAN NOTE
Left facial droop    She has had facial droop after recent attempted but aborted left carotid CEA in which he had lower mandibular nerve palsy  She had some facial droop on the left ever since  But today Occupational Therapy will not see her and states it would looked worse so they sent her into the emergency room  We will do a stroke work-up  MRI brain  Neurology consultation    Aspirin  Statin

## 2023-02-21 ENCOUNTER — APPOINTMENT (INPATIENT)
Dept: NON INVASIVE DIAGNOSTICS | Facility: HOSPITAL | Age: 63
End: 2023-02-21

## 2023-02-21 ENCOUNTER — HOME CARE VISIT (OUTPATIENT)
Dept: HOME HEALTH SERVICES | Facility: HOME HEALTHCARE | Age: 63
End: 2023-02-21

## 2023-02-21 ENCOUNTER — APPOINTMENT (INPATIENT)
Dept: MRI IMAGING | Facility: HOSPITAL | Age: 63
End: 2023-02-21

## 2023-02-21 VITALS — SYSTOLIC BLOOD PRESSURE: 157 MMHG | OXYGEN SATURATION: 98 % | HEART RATE: 38 BPM | DIASTOLIC BLOOD PRESSURE: 75 MMHG

## 2023-02-21 LAB
AORTIC ROOT: 3.7 CM
AORTIC VALVE MEAN VELOCITY: 10.2 M/S
APICAL FOUR CHAMBER EJECTION FRACTION: 60 %
ATRIAL RATE: 56 BPM
AV AREA BY CONTINUOUS VTI: 2.3 CM2
AV AREA PEAK VELOCITY: 1.8 CM2
AV LVOT MEAN GRADIENT: 2 MMHG
AV LVOT PEAK GRADIENT: 4 MMHG
AV MEAN GRADIENT: 5 MMHG
AV PEAK GRADIENT: 9 MMHG
AV REGURGITATION PRESSURE HALF TIME: 526 MS
AV VALVE AREA: 2.33 CM2
AV VELOCITY RATIO: 0.64
CHOLEST SERPL-MCNC: 249 MG/DL
DOP CALC AO PEAK VEL: 1.47 M/S
DOP CALC AO VTI: 28.06 CM
DOP CALC LVOT AREA: 2.83 CM2
DOP CALC LVOT DIAMETER: 1.9 CM
DOP CALC LVOT PEAK VEL VTI: 23.06 CM
DOP CALC LVOT PEAK VEL: 0.94 M/S
DOP CALC LVOT STROKE INDEX: 32.8 ML/M2
DOP CALC LVOT STROKE VOLUME: 65.35
E WAVE DECELERATION TIME: 150 MS
FRACTIONAL SHORTENING: 36 (ref 28–44)
HDLC SERPL-MCNC: 46 MG/DL
INTERVENTRICULAR SEPTUM IN DIASTOLE (PARASTERNAL SHORT AXIS VIEW): 1.2 CM
INTERVENTRICULAR SEPTUM: 1.2 CM (ref 0.6–1.1)
LAAS-AP2: 23.4 CM2
LAAS-AP4: 22.9 CM2
LDLC SERPL CALC-MCNC: 139 MG/DL (ref 0–100)
LEFT ATRIUM AREA SYSTOLE SINGLE PLANE A4C: 22.2 CM2
LEFT ATRIUM SIZE: 3.3 CM
LEFT INTERNAL DIMENSION IN SYSTOLE: 2.9 CM (ref 2.1–4)
LEFT VENTRICULAR INTERNAL DIMENSION IN DIASTOLE: 4.5 CM (ref 3.5–6)
LEFT VENTRICULAR POSTERIOR WALL IN END DIASTOLE: 1.1 CM
LEFT VENTRICULAR STROKE VOLUME: 60 ML
LVSV (TEICH): 60 ML
MV E'TISSUE VEL-LAT: 8 CM/S
MV E'TISSUE VEL-SEP: 6 CM/S
MV PEAK A VEL: 0.88 M/S
MV PEAK E VEL: 86 CM/S
MV STENOSIS PRESSURE HALF TIME: 44 MS
MV VALVE AREA P 1/2 METHOD: 5
P AXIS: 15 DEGREES
PR INTERVAL: 178 MS
QRS AXIS: -23 DEGREES
QRSD INTERVAL: 86 MS
QT INTERVAL: 450 MS
QTC INTERVAL: 434 MS
RA PRESSURE ESTIMATED: 3 MMHG
RIGHT ATRIUM AREA SYSTOLE A4C: 17 CM2
RIGHT VENTRICLE ID DIMENSION: 4.4 CM
RV PSP: 22 MMHG
SL CV AV DECELERATION TIME RETROGRADE: 1812 MS
SL CV AV PEAK GRADIENT RETROGRADE: 87 MMHG
SL CV LEFT ATRIUM LENGTH A2C: 5.6 CM
SL CV LV EF: 60
SL CV PED ECHO LEFT VENTRICLE DIASTOLIC VOLUME (MOD BIPLANE) 2D: 91 ML
SL CV PED ECHO LEFT VENTRICLE SYSTOLIC VOLUME (MOD BIPLANE) 2D: 32 ML
T WAVE AXIS: 26 DEGREES
TR MAX PG: 19 MMHG
TR PEAK VELOCITY: 2.2 M/S
TRICUSPID ANNULAR PLANE SYSTOLIC EXCURSION: 2 CM
TRICUSPID VALVE PEAK REGURGITATION VELOCITY: 2.17 M/S
TRIGL SERPL-MCNC: 322 MG/DL
VENTRICULAR RATE: 56 BPM

## 2023-02-21 RX ORDER — DICYCLOMINE HYDROCHLORIDE 10 MG/1
10 CAPSULE ORAL
Status: DISCONTINUED | OUTPATIENT
Start: 2023-02-21 | End: 2023-02-22 | Stop reason: HOSPADM

## 2023-02-21 RX ORDER — ONDANSETRON 4 MG/1
4 TABLET, ORALLY DISINTEGRATING ORAL EVERY 6 HOURS PRN
Status: DISCONTINUED | OUTPATIENT
Start: 2023-02-21 | End: 2023-02-22 | Stop reason: HOSPADM

## 2023-02-21 RX ADMIN — SPIRONOLACTONE 25 MG: 25 TABLET, FILM COATED ORAL at 18:36

## 2023-02-21 RX ADMIN — FUROSEMIDE 40 MG: 40 TABLET ORAL at 08:32

## 2023-02-21 RX ADMIN — TRAMADOL HYDROCHLORIDE 100 MG: 50 TABLET, COATED ORAL at 11:59

## 2023-02-21 RX ADMIN — TRAMADOL HYDROCHLORIDE 100 MG: 50 TABLET, COATED ORAL at 20:38

## 2023-02-21 RX ADMIN — HYDROXYZINE HYDROCHLORIDE 50 MG: 25 TABLET ORAL at 22:30

## 2023-02-21 RX ADMIN — METOPROLOL SUCCINATE 100 MG: 50 TABLET, EXTENDED RELEASE ORAL at 18:35

## 2023-02-21 RX ADMIN — LORAZEPAM 1 MG: 1 TABLET ORAL at 16:16

## 2023-02-21 RX ADMIN — MONTELUKAST 10 MG: 10 TABLET, FILM COATED ORAL at 22:30

## 2023-02-21 RX ADMIN — LORAZEPAM 1 MG: 1 TABLET ORAL at 22:23

## 2023-02-21 RX ADMIN — OXYCODONE HYDROCHLORIDE AND ACETAMINOPHEN 1000 MG: 500 TABLET ORAL at 08:32

## 2023-02-21 RX ADMIN — PANTOPRAZOLE SODIUM 40 MG: 40 TABLET, DELAYED RELEASE ORAL at 05:18

## 2023-02-21 RX ADMIN — FAMOTIDINE 20 MG: 20 TABLET, FILM COATED ORAL at 22:30

## 2023-02-21 RX ADMIN — FLUTICASONE FUROATE AND VILANTEROL TRIFENATATE 1 PUFF: 200; 25 POWDER RESPIRATORY (INHALATION) at 08:37

## 2023-02-21 RX ADMIN — LORAZEPAM 1 MG: 1 TABLET ORAL at 03:29

## 2023-02-21 RX ADMIN — APIXABAN 5 MG: 5 TABLET, FILM COATED ORAL at 08:32

## 2023-02-21 RX ADMIN — METOPROLOL SUCCINATE 100 MG: 50 TABLET, EXTENDED RELEASE ORAL at 08:32

## 2023-02-21 RX ADMIN — APIXABAN 5 MG: 5 TABLET, FILM COATED ORAL at 18:36

## 2023-02-21 RX ADMIN — ONDANSETRON 4 MG: 4 TABLET, ORALLY DISINTEGRATING ORAL at 20:38

## 2023-02-21 RX ADMIN — TRAMADOL HYDROCHLORIDE 100 MG: 50 TABLET, COATED ORAL at 03:29

## 2023-02-21 RX ADMIN — ACETAMINOPHEN 325MG 650 MG: 325 TABLET ORAL at 13:18

## 2023-02-21 RX ADMIN — LORAZEPAM 1 MG: 1 TABLET ORAL at 10:05

## 2023-02-21 RX ADMIN — SPIRONOLACTONE 25 MG: 25 TABLET, FILM COATED ORAL at 08:32

## 2023-02-21 RX ADMIN — ACETAMINOPHEN 325MG 650 MG: 325 TABLET ORAL at 07:36

## 2023-02-21 RX ADMIN — ASPIRIN 81 MG: 81 TABLET, CHEWABLE ORAL at 08:32

## 2023-02-21 NOTE — ASSESSMENT & PLAN NOTE
Wt Readings from Last 3 Encounters:   02/21/23 89 8 kg (198 lb)   02/16/23 87 1 kg (192 lb)   02/10/23 87 5 kg (193 lb)     · Echo 2/21/2023 with 60% EF and G1DD  · Patient appears euvolemic  · Continue metoprolol, Lasix, and aldactone  · I/O, dw

## 2023-02-21 NOTE — CONSULTS
[facility] 1960, 70 y o  female MRN: [id number]  Unit/Bed#: E4 -01 Encounter: [id number]  Primary Care Provider: Donal Ferreira DO   Date and time admitted to hospital: 2/20/2023  1:38 PM    Inpatient consult to Vascular Surgery  Consult performed by: JUD Perla  Consult ordered by: Low Voss PA-C          Asymptomatic stenosis of left carotid artery  Assessment & Plan  Patient is 57year old female, former smoker, with PMH HTN, HLD, pAfib, CAD, s/p PPM, CAMILLE, hx of SMA Thrombosis s/p BES @ [facility], obesity, hx of TIA, and asymptomatic L ICA stenosis  Patient is known to vascular surgery practice and was previously admitted for L CEA on 1/31/23 with Dr Tobias Mason, however procedure was aborted due to patient anatomy  Post-operatively patient was noted to have hypoglossal and marginal L mandibular nerve palsy  Hypoglossal nerve palsy resolved, however patient did continue with residual mandibular nerve palsy  She was discharged home, however returned to the ED on 2/20/23 with worsening L sided facial droop  Vascular surgery was consulted for input and to evaluate L CEA site  Imaging/Diagnostics:  2/20/23 CTA Head/Neck:  LEFT EXTRACRANIAL CAROTID SEGMENT: Similar to the prior examination, severe high-grade stenosis at the ICA origin, approximately 80%  Postoperative change is seen within the left neck including small low density collection at the level of the bifurcation and tracking inferiorly, within the carotid space anterior to the common carotid artery may represent small postoperative hematoma  No significant mass effect upon the adjacent structures  Recommendations:  -L CEA incision appears to be healing appropriately  Ecchymosis no longer noted, no redness, warmth or drainage   Site is soft with minimal edema noted, mildly tender to palpation   -Patient continues with L side facial paralysis, however she does feel that it has improved since admission  Denies any other stroke-like symptoms  Eating and drinking without issue   -Of note, patient had reached out to vascular center on 02/08 with similar concerns and felt this was likely due to the fact that she had been laying on left side while sleeping   -Results of CTA reviewed with patient  We discussed that it is possible that her positioning could've contributed to increased swelling and irritation of mandibular nerve   -Neurology input appreciated  Planning for MRI today   -Patient tearful at times during exam, emotional support provided  Patient did have questions regarding plan for R TCAR in the future and was provided information regarding procedure   -Will D/w Dr Maria Guadalupe Molina        Review of Systems:  General: negative for - chills or fever  Cardiovascular: no chest pain or dyspnea on exertion  Respiratory: no cough, shortness of breath, or wheezing  Gastrointestinal: no abdominal pain, change in bowel habits, or black or bloody stools  Neurological ROS: positive for - numbness/tingling and speech problems  negative for - dizziness, gait disturbance, impaired coordination/balance, memory loss or visual changes  Psychological ROS: positive for - anxiety  Ophthalmic ROS: negative for - amaurosis fugax    Past Medical History:  Past Medical History:   Diagnosis Date   • A-fib ([facility] ) 03/2020   • Allergic    • Anxiety    • Arthritis    • Asthma    • Back pain     and muscle pain   • Bruises easily    • Chronic pain disorder     Interstitial pain   • Colon polyp    • Dental bridge present    • Depression    • Eczema    • GERD (gastroesophageal reflux disease)    • Heart murmur    • History of blood clots     per pt "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"   • History of pneumonia    • Hives    • Hyperlipidemia    • Hypertension    • Infertility, female    • Interstitial cystitis    • Migraine     sees neurologist   • Motion sickness    • Palpitations    • PONV (postoperative nausea and vomiting)    • Psychiatric disorder    • TIA (transient ischemic attack) 2022    per pt --"had MRI and 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"   • Urinary incontinence     wears Pads   • Wears glasses        Past Surgical History:  Past Surgical History:   Procedure Laterality Date   • COLONOSCOPY     • DILATION AND CURETTAGE OF UTERUS     • EGD     • EXPLORATORY LAPAROTOMY      for infertility   • HAND SURGERY      Hand excision of tendon cyst   • RI LAPAROSCOPIC APPENDECTOMY N/A 2022    Procedure: APPENDECTOMY LAPAROSCOPIC;  Surgeon:  Kiko Reyna MD;  [facility];  Service: General   • RI TEAEC W/PATCH GRF CAROTID VERTB Víctor Left 2023    Procedure: EXPLORATION OF LEFT CAROTID ARTERY; ABORTED ENDARTERECTOMY ARTERY CAROTID;  Surgeon: Amber Lambert DO;  Location: [facility];  Service: Vascular   • SUPERIOR MESTENTERIC ARTERY STENT     • WISDOM TOOTH EXTRACTION         Social History:  Social History     Substance and Sexual Activity   Alcohol Use Never     Social History     Substance and Sexual Activity   Drug Use No     Social History     Tobacco Use   Smoking Status Former   • Packs/day: 0 50   • Years: 10 00   • Pack years: 5 00   • Types: Cigarettes   • Quit date:    • Years since quittin 1   • Passive exposure: Never   Smokeless Tobacco Never       Family History:  Family History   Problem Relation Age of Onset   • Lung cancer Mother 61   • Brain cancer Mother 61   • Diabetes Father    • Depression Father    • Other Father         septic   • Allergies Sister    • Hashimoto's thyroiditis Sister    • Abdominal aortic aneurysm Sister    • Diabetes Sister    • No Known Problems Sister    • No Known Problems Maternal Grandmother    • No Known Problems Maternal Grandfather    • No Known Problems Paternal Grandmother    • No Known Problems Paternal Grandfather    • Hodgkin's lymphoma Brother    • No Known Problems Brother    • No Known Problems Maternal Aunt    • Diabetes Other        Allergies: Allergies   Allergen Reactions   • Ace Inhibitors Angioedema and Anaphylaxis     Anaphylaxis   • Benicar [Olmesartan] Angioedema     See Allergy note from 9/11/2008  Swollen ankles/legs   • Hydralazine Other (See Comments)     Lip swelling  Flank pain   • Other Anaphylaxis and Other (See Comments)     Other reaction(s): angioadema  Other reaction(s): Other (See Comments)  Preservatives- itching throat closes, hi  Other reaction(s): angioadema  E Z Cat - hives throat closes itching,  Preservatives- itching throat closes, hi  Artificial sweeteners   • Valsartan Angioedema     Lips, face swollen   • Sulfa Antibiotics Hives     stuffiness,itching,hives,throat closing--per pt "sometimes able to take depending on the brand and the filler or possibly preservatives in it"   • Ampicillin Hives     Depends on brand some preservatives can react  Has recently tolerated oral amoxicillin     • Motrin [Ibuprofen] Other (See Comments)     Per pt " told not to take due to A Fib"   • Wound Dressing Adhesive Other (See Comments)     Per pt Adhesive on EKG leads caused redness       Medications:  Current Facility-Administered Medications   Medication Dose Route Frequency   • acetaminophen (TYLENOL) tablet 650 mg  650 mg Oral Q6H PRN   • apixaban (ELIQUIS) tablet 5 mg  5 mg Oral BID   • ascorbic acid (VITAMIN C) tablet 1,000 mg  1,000 mg Oral Daily   • aspirin chewable tablet 81 mg  81 mg Oral Daily   • atorvastatin (LIPITOR) tablet 40 mg  40 mg Oral QPM   • butalbital-acetaminophen-caffeine (FIORICET,ESGIC) -40 mg per tablet 1 tablet  1 tablet Oral Q6H PRN   • Evolocumab SOAJ 140 mg  1 mL Subcutaneous Q14 Days   • famotidine (PEPCID) tablet 20 mg  20 mg Oral HS   • fluticasone-vilanterol 200-25 mcg/actuation 1 puff  1 puff Inhalation Daily   • furosemide (LASIX) tablet 40 mg  40 mg Oral Daily   • Galcanezumab-gnlm SOAJ 120 mg  120 mg Subcutaneous Q30 Days   • hydrOXYzine HCL (ATARAX) tablet 50 mg 50 mg Oral HS   • LORazepam (ATIVAN) tablet 1 mg  1 mg Oral Q6H PRN   • metoprolol succinate (TOPROL-XL) 24 hr tablet 100 mg  100 mg Oral BID   • montelukast (SINGULAIR) tablet 10 mg  10 mg Oral HS   • ondansetron (ZOFRAN) injection 4 mg  4 mg Intravenous Q6H PRN   • pantoprazole (PROTONIX) EC tablet 40 mg  40 mg Oral Early Morning   • phenazopyridine (PYRIDIUM) tablet 200 mg  200 mg Oral TID PRN   • spironolactone (ALDACTONE) tablet 25 mg  25 mg Oral BID   • traMADol (ULTRAM) tablet 100 mg  100 mg Oral Q8H PRN       Vitals:  Vitals:    02/21/23 0815   BP: 138/85   Pulse: 60   Resp:    Temp:    SpO2:        I/Os:  No intake/output data recorded  No intake/output data recorded      Lab Results and Cultures:   CBC with diff:   Lab Results   Component Value Date    WBC 6 62 02/20/2023    HGB 12 7 02/20/2023    HCT 37 9 02/20/2023    MCV 90 02/20/2023     02/20/2023    MCH 30 1 02/20/2023    MCHC 33 5 02/20/2023    RDW 12 2 02/20/2023    MPV 9 0 02/20/2023    NRBC 0 09/21/2022   ,   BMP/CMP:  Lab Results   Component Value Date     11/13/2017    K 4 7 02/20/2023    K 4 0 07/27/2021     02/20/2023     07/27/2021    CO2 26 02/20/2023    CO2 27 07/27/2021    BUN 22 02/20/2023    BUN 16 07/27/2021    CREATININE 0 66 02/20/2023    CREATININE 0 83 11/13/2017    CALCIUM 9 4 02/20/2023    CALCIUM 9 9 07/27/2021    AST 20 09/21/2022    AST 44 (H) 07/27/2021    ALT 28 09/21/2022    ALT 44 (H) 07/27/2021    ALKPHOS 115 09/21/2022    ALKPHOS 119 07/27/2021    PROT 7 0 11/13/2017    BILITOT 0 3 11/13/2017    EGFR 92 02/20/2023   ,   Lipid Panel:   Lab Results   Component Value Date    CHOL 413 (H) 11/13/2017   ,   Coags:   Lab Results   Component Value Date    PTT 32 02/20/2023    INR 1 14 02/20/2023   ,     Blood Culture: No results found for: BLOODCX,   Urinalysis:   Lab Results   Component Value Date    COLORU Yellow 12/26/2022    CLARITYU Cloudy 12/26/2022    SPECGRAV 1 010 12/26/2022    PHUR 6 5 12/26/2022    LEUKOCYTESUR Moderate (A) 12/26/2022    NITRITE Positive (A) 12/26/2022    GLUCOSEU Negative 12/26/2022    KETONESU Negative 12/26/2022    BILIRUBINUR Negative 12/26/2022    BLOODU Trace-lysed (A) 12/26/2022   ,   Urine Culture:   Lab Results   Component Value Date    URINECX No Growth <1000 cfu/mL 01/23/2023       Physical Exam:    General appearance: alert and oriented, in no acute distress  Head: Normocephalic, without obvious abnormality, atraumatic  Eyes: conjunctivae/corneas clear  PERRL, EOM's intact  Fundi benign    Throat: normal findings: tongue midline and normal  Neck: supple, symmetrical, trachea midline and mild edema to left side, however soft to palpation  Lungs: clear to auscultation bilaterally  Heart: regular rate and rhythm, S1, S2 normal, no murmur, click, rub or gallop  Extremities: extremities normal, warm and well-perfused; no cyanosis, clubbing, or edema  Skin: healing incision left side of neck  Neurologic: Grossly normal    Pulse exam:  Radial: Right: 2+ Left[de-identified] 2+      JUD Carbajal  2/21/2023

## 2023-02-21 NOTE — PLAN OF CARE
Problem: Neurological Deficit  Goal: Neurological status is stable or improving  Description: Interventions:  - Monitor and assess patient's level of consciousness, motor function, sensory function, and level of assistance needed for ADLs  - Monitor and report changes from baseline  Collaborate with interdisciplinary team to initiate plan and implement interventions as ordered  - Provide and maintain a safe environment  - Consider seizure precautions  - Consider fall precautions  - Consider aspiration precautions  - Consider bleeding precautions  Outcome: Progressing     Problem: Activity Intolerance/Impaired Mobility  Goal: Mobility/activity is maintained at optimum level for patient  Description: Interventions:  - Assess and monitor patient  barriers to mobility and need for assistive/adaptive devices  - Assess patient's emotional response to limitations  - Collaborate with interdisciplinary team and initiate plans and interventions as ordered  - Encourage independent activity per ability   - Maintain proper body alignment  - Perform active/passive rom as tolerated/ordered  - Plan activities to conserve energy   - Turn patient as appropriate  Outcome: Progressing     Problem: Knowledge Deficit  Goal: Patient/family/caregiver demonstrates understanding of disease process, treatment plan, medications, and discharge instructions  Description: Complete learning assessment and assess knowledge base    Interventions:  - Provide teaching at level of understanding  - Provide teaching via preferred learning methods  Outcome: Progressing

## 2023-02-21 NOTE — OCCUPATIONAL THERAPY NOTE
Occupational Therapy Evaluation     Patient Name: Diego Cespedes  Today's Date: 2/21/2023  Problem List  Principal Problem:    Facial droop  Active Problems:    Benign essential hypertension    Hyperlipidemia    Paroxysmal atrial fibrillation (HCC)    Hypertensive heart disease with congestive heart failure (HCC)    COPD (chronic obstructive pulmonary disease) ([facility] )    Carotid artery stenosis    Past Medical History  Past Medical History:   Diagnosis Date    A-fib ([facility] ) 03/2020    Allergic     Anxiety     Arthritis     Asthma     Back pain     and muscle pain    Bruises easily     Chronic pain disorder     Interstitial pain    Colon polyp     Dental bridge present     Depression     Eczema     GERD (gastroesophageal reflux disease)     Heart murmur     History of blood clots     per pt "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"    History of pneumonia     Hives     Hyperlipidemia     Hypertension     Infertility, female     Interstitial cystitis     Migraine     sees neurologist    Motion sickness     Palpitations     PONV (postoperative nausea and vomiting)     Psychiatric disorder     TIA (transient ischemic attack) 09/2022    per pt --"had MRI and 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"    Urinary incontinence     wears Pads    Wears glasses      Past Surgical History  Past Surgical History:   Procedure Laterality Date    COLONOSCOPY      DILATION AND CURETTAGE OF UTERUS      EGD      EXPLORATORY LAPAROTOMY      for infertility    HAND SURGERY      Hand excision of tendon cyst    CA LAPAROSCOPIC APPENDECTOMY N/A 05/03/2022    Procedure: APPENDECTOMY LAPAROSCOPIC;  Surgeon:  Nneka Mitchell MD;  [facility];  Service: General    CA TEAEC W/PATCH GRF CAROTID VERTB Kyriebury Left 1/31/2023    Procedure: EXPLORATION OF LEFT CAROTID ARTERY; ABORTED ENDARTERECTOMY ARTERY CAROTID;  Surgeon: Reagan Ferrera DO;  [facility];  Service: Vascular    SUPERIOR [address] ARTERY STENT      WISDOM TOOTH EXTRACTION             02/21/23 1028   OT Last Visit   OT Visit Date 02/21/23   Note Type   Note type Evaluation   Pain Assessment   Pain Assessment Tool 0-10   Pain Score 7   Pain Location/Orientation Orientation: Left; Location: Face; Location: Head;Location: Leg   Pain Onset/Description Onset: Ongoing   Hospital Pain Intervention(s) Repositioned; Ambulation/increased activity; Rest   Multiple Pain Sites No   Restrictions/Precautions   Weight Bearing Precautions Per Order No   Other Precautions Fall Risk;Pain   Home Living   Type of [address] level;Performs ADLs on one level; Able to live on main level with bedroom/bathroom   Bathroom Shower/Tub Tub/shower unit   Bathroom Toilet Standard   Bathroom Equipment Commode  (commode in shower as shower chair)   [address] Other (Comment)  (none)   Additional Comments 1 XIOMARA  No AD at baseline for functional mobility/transfers  Laundry 12 steps down to basement  Prior Function   Level of [place] Independent with ADLs; Independent with functional mobility; Independent with IADLS   Lives With Alone   Receives Help From Neighbor   IADLs Independent with driving; Independent with meal prep; Independent with medication management   Falls in the last 6 months 0   Vocational Retired   Comments VNA 2x/ week to monitor vitals, HHOT/HHPt 2x/week  Lifestyle   Autonomy PTA, pt was Independent in all ADLs, IADLs, and functional mobility w/o AD  (+)   (-) falls     Reciprocal Relationships neighbors   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   General   Family/Caregiver Present No   Subjective   Subjective "I just want to get better"   ADL   Where Assessed Edge of bed   Eating Assistance 7  Independent   Grooming Assistance 6  Modified Independent   UB Bathing Assistance 6  [phone]  [facility] Assistance 5  Supervision/Setup   Toileting Assistance  5  Supervision/Setup   Bed Mobility   Supine to Sit 6  Modified independent   Additional items HOB elevated; Increased time required   Sit to Supine 6  Modified independent   Additional items HOB elevated; Increased time required   Transfers   Sit to Stand 5  Supervision   Additional items Assist x 1; Increased time required;Verbal cues   Stand to Sit 5  Supervision   Additional items Assist x 1; Increased time required;Verbal cues   Additional Comments VC for hand placement and safe transfer techniques  Functional Mobility   Functional Mobility 5  Supervision   Additional items   (w/o AD)   Balance   Static Sitting Good   Dynamic Sitting Fair +   Static Standing Fair   Dynamic Standing Fair -   Ambulatory Fair -   Activity Tolerance   Activity Tolerance Patient tolerated treatment well;Patient limited by pain  (BP EOB: [birth year]  RN aware )   Medical Staff Made Aware Tunde PT   Nurse Made Aware Marycruz FAUSTIN   RUE Assessment   RUE Assessment WFL  (4/5 throughout)   LUE Assessment   LUE Assessment WFL  (4-/5 throughout)   Hand Function   Gross Motor Coordination Functional   Fine Motor Coordination Functional   Sensation   Light Touch Partial deficits in the LUE;Partial deficits in the LLE   Additional Comments Pt reports numbness/tingling in L side  Proprioception   Proprioception No apparent deficits   Vision-Basic Assessment   Current Vision Wears glasses all the time   Vision - Complex Assessment   Acuity Able to read employee name badge without difficulty   Psychosocial   Psychosocial (WDL) X   Patient Behaviors/Mood Flat affect   Perception   Inattention/Neglect Appears intact   Cognition   Overall Cognitive Status WFL   Arousal/Participation Alert; Cooperative   Attention Within functional limits   Orientation Level Oriented X4   Memory Decreased recall of recent events   Following Commands Follows all commands and directions without difficulty   Assessment Prognosis Fair   Assessment Pt is a 70 y o , female, seen for OT Eval on 2/21/2023 admitted to [facility] with L side facial droop  Neurology consulted, stroke pathway/additional neurodiagnostics deemed unnecessary  Vascular surgery attempted a L carotid endarterectomy in January 2023, however, had an anatomical defect and had to abort the surgery  Pt c/o of lower mandibular nerve palsy since attempted surgery  Head/neck CTA (+) for severe high-grade stenosis at origin of L internal carotid artery  Relevant comorbidities and PMH which may impact occupational performance include: HTN, HLD, Afib, CHF, COPD, carotid artery stenosis, anxiety  Active OT and activity orders include Activity as tolerated  Pt lives alone in a 1-level apartment with 1 XIOMARA  PTA, pt was Independent in all ADLs, IADLs, and functional mobility w/o AD  (+)   (-) falls  Upon evaluation, pt is Mod I-Independent in UB ADLs, Supervision in LB ADLs, Supervision in toileting, Mod I in bed mobility, and Supervision in functional mobility/transfers w/o AD 2* the following deficits: pain, weakness, decreased endurance, decreased strength , decreased sensation and decreased balance  Other personal factors impacting pt performance in occupation include: fall risk, decreased functional mobility/transfers, limited home support, difficulty performing ADLs and difficulty performing IADLs  However, these impairments do not act as a barrier for the pt to safely and effectively perform various occupations  Pt is functioning near baseline performance level with limited ADL deficits  Restorative program and hospital staff to mobilize pt during hospital stay  Recommend d/c Home with continued PT  D/c from OT caseload at this time     Goals   Patient Goals to get better   Plan   OT Frequency Eval only   Recommendation   OT Discharge Recommendation No rehabilitation needs  (continued PT)   Additional Comments  The patient's raw score on the AM-PAC Daily Activity Inpatient Short Form is 21  A raw score of greater than or equal to 19 suggests the patient may benefit from discharge to home  Please refer to the recommendation of the Occupational Therapist for safe discharge planning  AM-PAC Daily Activity Inpatient   Lower Body Dressing 3   Bathing 3   Toileting 3   Upper Body Dressing 4   Grooming 4   Eating 4   Daily Activity Raw Score 21   Daily Activity Standardized Score (Calc for Raw Score >=11) 44 27   AM-PAC Applied Cognition Inpatient   Following a Speech/Presentation 4   Understanding Ordinary Conversation 4   Taking Medications 4   Remembering Where Things Are Placed or Put Away 4   Remembering List of 4-5 Errands 4   Taking Care of Complicated Tasks 4   Applied Cognition Raw Score 24   Applied Cognition Standardized Score 62 21   End of Consult   Patient Position at End of Consult Supine; All needs within reach     PEDRO Levin

## 2023-02-21 NOTE — ASSESSMENT & PLAN NOTE
· Patient recently started on Repatha twice monthly due to myalgias on statin medications   · Started on Lipitor 40 mg here during stroke work up, continue with [address] on discharge with Cardiology  · Lipid panel with total 249, triglycerides 322, , continue Lipitor and monitor SE's

## 2023-02-21 NOTE — PHYSICAL THERAPY NOTE
PT EVALUATION    Pt  Name: Cleo Barragan  Pt  Age: 70 y o  MRN: [id number]  LENGTH OF STAY: 1      Admitting Diagnoses:   Numbness [R20 0]  Vitamin B12 deficiency [E53 8]  High blood pressure [I10]  Chronic fatigue [R53 82]  Partial facial nerve palsy [G51 0]    Past Medical History:   Diagnosis Date    A-fib ([address] ) 03/2020    Allergic     Anxiety     Arthritis     Asthma     Back pain     and muscle pain    Bruises easily     Chronic pain disorder     Interstitial pain    Colon polyp     Dental bridge present     Depression     Eczema     GERD (gastroesophageal reflux disease)     Heart murmur     History of blood clots     per pt "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"    History of pneumonia     Hives     Hyperlipidemia     Hypertension     Infertility, female     Interstitial cystitis     Migraine     sees neurologist    Motion sickness     Palpitations     PONV (postoperative nausea and vomiting)     Psychiatric disorder     TIA (transient ischemic attack) 09/2022    per pt --"had MRI and 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"    Urinary incontinence     wears Pads    Wears glasses        Past Surgical History:   Procedure Laterality Date    COLONOSCOPY      DILATION AND CURETTAGE OF UTERUS      EGD      EXPLORATORY LAPAROTOMY      for infertility    HAND SURGERY      Hand excision of tendon cyst    PA LAPAROSCOPIC APPENDECTOMY N/A 05/03/2022    Procedure: APPENDECTOMY LAPAROSCOPIC;  Surgeon:  Casper Homans, MD;  [facility];  Service: General    PA TEAEC W/PATCH GRF CAROTID VERTB [place] Left 1/31/2023    Procedure: EXPLORATION OF LEFT CAROTID ARTERY; ABORTED ENDARTERECTOMY ARTERY CAROTID;  Surgeon: Juan Carlos Cantu DO;  [facility];  Service: Vascular    SUPERIOR [address] ARTERY STENT      WISDOM TOOTH EXTRACTION         Imaging Studies:  CTA stroke alert (head/neck)   Final Result by Konrad Taylor DO (02/20 9502)      Once again identified is a severe high-grade stenosis at the origin of the left internal carotid artery in this patient with recently attempted carotid endarterectomy  This appears similar to the prior examination  Above the internal carotid artery    bulb the vessel is normal in caliber with the exception of mild narrowing of the periophthalmic segment intracranially  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  Postoperative change is seen within the left neck including small low density collection at the level of the bifurcation and tracking inferiorly, within the carotid space anterior to the common carotid artery may represent small postoperative hematoma  This does not result in significant mass effect upon the adjacent structures  Findings were directly discussed with Jewel Aleman at 2:18 PM                            Workstation performed: [id number]         CT stroke alert brain   Final Result by Konrad Thomson DO (02/20 2465)      No acute intracranial abnormality  Findings were directly discussed with Delta Presumnanci at 2:18 PM       Workstation performed: [id number]         MRI Inpatient Order    (Results Pending)      02/21/23 1001   PT Last Visit   PT Visit Date 02/21/23   Note Type   Note type Evaluation   Pain Assessment   Pain Assessment Tool 0-10   Pain Score 7   Pain Location/Orientation Orientation: Left; Location: Head  (face)   Hospital Pain Intervention(s) Medication (See MAR); Repositioned; Ambulation/increased activity; Emotional support; Rest   Restrictions/Precautions   Weight Bearing Precautions Per Order No   Other Precautions Fall Risk   Home Living   Type of [address] [facility] One level;Performs ADLs on one level; Able to live on main level with bedroom/bathroom; Access; Laundry in basement   [employer] Tub/shower unit   [facility]   (commode as chair in shower)   Bathroom Accessibility Accessible Additional Comments 1 XIOMARA, no AD at baseline, 12 steps down to laundry in basement   Prior Function   Level of Melissa Independent with ADLs; Independent with functional mobility   Lives With Alone   Receives Help From Neighbor;Home health  ([address])   Falls in the last 6 months 0   Vocational Retired   Comments (+) ; home PT/OT 2x wk   General   Additional Pertinent History attemped left CEA surgery 1/31/23 but aborted w/ subsequent post-op left mandibular palsy; incision in lateral left neck; coronary artery stenosis, COPD, a-fib   Family/Caregiver Present No   Cognition   Overall Cognitive Status WFL   Arousal/Participation Cooperative   Attention Within functional limits   Orientation Level Oriented X4   Following Commands Follows all commands and directions without difficulty   Comments pt pleasant & cooperative   Subjective   Subjective pt reports feeling anxious; agreeable to therapy   RUE Assessment   RUE Assessment   (see OT eval)   LUE Assessment   LUE Assessment   (see OT eval)   RLE Assessment   RLE Assessment WFL  (grossly 4/5)   LLE Assessment   LLE Assessment WFL  (grossly 4-/5)   Light Touch   RLE Light Touch Grossly intact   LLE Light Touch Impaired  (notes decrease sensitivity on back of LLE from foot to posterior knee)   Bed Mobility   Rolling R 6  Modified independent   Additional items HOB elevated; Bedrails   Supine to Sit 6  Modified independent   Additional items HOB elevated; Bedrails   Sit to Supine 6  Modified independent   Additional items HOB elevated; Bedrails   Transfers   Sit to Stand 5  Supervision   Additional items Increased time required   Stand to Sit 5  Supervision   Ambulation/Elevation   Gait pattern Antalgic; Excessively slow;Decreased heel strike;Decreased toe off;Step through pattern; Short stride;Decreased foot clearance  (left limb pain)   Gait Assistance 5  Supervision   Assistive Device None   Distance 110'   Ambulation/Elevation Additional Comments no gross LOB; pace decreased by end of ambulation; limited by L sided pain   Balance   Static Sitting Good   Dynamic Sitting Fair +   Static Standing Fair +   Dynamic Standing Fair -   Ambulatory Fair -   Endurance Deficit   Endurance Deficit Yes   Endurance Deficit Description limited by L sided pain   Activity Tolerance   Activity Tolerance Patient tolerated treatment well;Patient limited by pain   Medical Staff Made Aware RADHA Brooks   Nurse Made Aware [address] [facility] [facility] [address] [facility] RN   Assessment   Prognosis Good   Problem List Decreased strength;Decreased endurance; Impaired balance;Decreased mobility;Pain   Assessment Pt 70 y o  female admitted to [address] [place] [address] on February 20, 2023 for Facial droop  PT eval and tx order placed PTA, pt was independent w/ all functional mobility w/ no AD, has 1 XIOMARA, lives alone in single level apartment and retired  Past Hx includes attempted left CEA surgery on 1/31/2023 but aborted w/ resulting in post-op left lower manibular palsy; hx carotid artery stenosis, COPD, a-fib, and HTN  Samantha Osborn Upon evaluation, pt exhibits weakness, impaired balance, decreased endurance, gait deviations, pain, decreased activity tolerance, decreased functional mobility tolerance, altered sensation and fall risk as noted in flow sheet  Pt demonstrated bed mobility w/ Mod I and transfers w/ Supervision requiring verbal cuing for proper mechanics and safety  Pt was able to ambulate using no assistive device w/ Supervision   Observable gait deviations include decreased speed, antalgic, wide MEÑO, decreased foot clearance, decreased heel strike and decreased toe off  During ambulation, no gross LOB and no complaints of dizziness, lightheadedness or SOB  The above mentioned impairments limit pt's ability for independent functional mobility hence will benefit from skilled PT during hospital stay to improve function   Pt is appropriate for restorative therapy to progress towards baseline function while receiving acute care services  The patient's [facility] Basic Mobility Inpatient Short Form Raw Score is 20  A Raw score of greater than 16 suggests the patient may benefit from discharge to home  Please also refer to the recommendation of the Physical Therapist for safe discharge planning  PT will recommend home with HHPT vs OPPT upon d/c from acute care once medically managed to improve functional mobility to baseline  Education provided to pt regarding importance of PT  Continue to encourage mobilization w/ nursing including OOB for meals as tolerated to prevent further functional decline  Nursing notified  Pt tolerated session well  Pt at end of session, in stable condition, sitting at bedside chair with all needs within reach  Co-eval with OT necessary for pt's best interest and medical complexity  Goals   Patient Goals to continue progress w/ therapy services at home   STG Expiration Date 03/03/23   Short Term Goal #1 Within 10 days, to progress towards baseline, improve safety, and decrease caregiver burden, pt to: 1  Improve strength by at least 1 grade in all ROM   2  Improve balance by at least 1 grade  3  Improve assistance level to Mod I w/ transfers  4  Increase ambulating distance to >300 ft with appropriate AD w/ mod I  5  navigate stairs w/ modified I; 6  Pt/ family education  PT Treatment Day 0   Plan   Treatment/Interventions Functional transfer training;LE strengthening/ROM; Therapeutic exercise;Elevations; Endurance training;Patient/family training;Bed mobility;Gait training;Spoke to nursing;OT   PT Frequency 2-3x/wk   Recommendation   PT Discharge Recommendation Home with home health rehabilitation   Equipment Recommended   (monitor)   Additional Comments home w/ HHPT vs OPPT   [facility] Basic Mobility Inpatient   Turning in Flat Bed Without Bedrails 4   Lying on Back to Sitting on Edge of Flat Bed Without Bedrails 4   Moving Bed to Chair 3   Standing Up From Chair Using Arms 3   Walk in Room 3   Climb 3-5 Stairs With Yifan 3   Basic Mobility Inpatient Raw Score 20   Basic Mobility Standardized Score 43 99   Highest Level Of Mobility   -HLM Goal 6: Walk 10 steps or more   -HLM Achieved 7: Walk 25 feet or more   End of Consult   Patient Position at End of Consult Supine; All needs within reach   End of Consult Comments pt at end of session in stable condition, supine in bed, w/ all needs w/in reach   Hx/personal factors: co-morbidities, dec caregiver support, home alone, advanced age, and fall risk  Examination: dec mobility, dec balance, dec endurance, dec amb, risk for falls  Clinical: unpredictable (ongoing medical status and risk for falls)  Complexity: high    Sylvester Andrade SPT

## 2023-02-21 NOTE — ASSESSMENT & PLAN NOTE
· Presented with left facial droop per patients home occupational therapist who reported it looked worse  · See CEA hx plan below - left facial numbness and asymmetry which appears to be subacute on chronic   Improving since admission  · Patient also with L > R upper extremities paresthesias which appear to be median neuropathy in origin, as well as significant shooting pain in left lower leg consistent with sciatica  · Admitted for stroke work up  · Echo - 60% EF with G1DD  · Continue asp and statin  · Appreciate neurology recommendations  · CT head negative for intracranial abnormalities  · Appreciate vascular recommendations  · CTA head/neck severe high-grade stenosis at the origin of the left internal carotid artery in this patient with recently attempted CEA postoperative changes of the left neck without significant mass effect  · Pending MRI  · Neuro checks

## 2023-02-21 NOTE — SPEECH THERAPY NOTE
Speech Language/Pathology  Speech/Language Pathology  Assessment    Patient Name: Nita Lozano  Today's Date: 2/21/2023     Problem List  Principal Problem:    Facial droop  Active Problems:    Benign essential hypertension    Hyperlipidemia    Paroxysmal atrial fibrillation (HCC)    Hypertensive heart disease with congestive heart failure (HCC)    COPD (chronic obstructive pulmonary disease) ([facility] )    Carotid artery stenosis    Past Medical History  Past Medical History:   Diagnosis Date   • A-fib ([place] [id number] ) 03/2020   • Allergic    • Anxiety    • Arthritis    • Asthma    • Back pain     and muscle pain   • Bruises easily    • Chronic pain disorder     Interstitial pain   • Colon polyp    • Dental bridge present    • Depression    • Eczema    • GERD (gastroesophageal reflux disease)    • Heart murmur    • History of blood clots     per pt "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"   • History of pneumonia    • Hives    • Hyperlipidemia    • Hypertension    • Infertility, female    • Interstitial cystitis    • Migraine     sees neurologist   • Motion sickness    • Palpitations    • PONV (postoperative nausea and vomiting)    • Psychiatric disorder    • TIA (transient ischemic attack) 09/2022    per pt --"had MRI and 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"   • Urinary incontinence     wears Pads   • Wears glasses      Past Surgical History  Past Surgical History:   Procedure Laterality Date   • COLONOSCOPY     • DILATION AND CURETTAGE OF UTERUS     • EGD     • EXPLORATORY LAPAROTOMY      for infertility   • HAND SURGERY      Hand excision of tendon cyst   • LA LAPAROSCOPIC APPENDECTOMY N/A 05/03/2022    Procedure: APPENDECTOMY LAPAROSCOPIC;  Surgeon:  Kelly Novoa MD;  [facility];  Service: General   • LA TEAEC W/PATCH GRF CAROTID VERTB [place] Left 1/31/2023    Procedure: EXPLORATION OF LEFT CAROTID ARTERY; ABORTED ENDARTERECTOMY ARTERY CAROTID;  Surgeon: DO Kb;  Location: Spoke, with the patient and informed him of the doctors message below. Pt states that he will schedule his appointment on his my chart. [facility];  Service: Vascular   • SUPERIOR MESTENTERIC ARTERY STENT     • WISDOM TOOTH EXTRACTION          Bedside Swallow Evaluation:    Summary:  Patient known to me from recent admit for failed CEA  She was discharged on a regular diet at that time  Pt presented w/ clear speech  Face symmetrical at rest   Tongue protrusion and lateralization WNL  Lip retraction reduced on the left  She reports some tingling on the left side  Patient was able to take all of her meds whole with water WNL  No anterior leakage  Bolus control formation and transfer were WNL  No cough or wet vocal quality  Swallows prompt  Patient on a regular diet  Had fresh fruit but was avoiding the pineapple  States she avoids things that are very hard to chew  Also had oatmeal, tea  Oral pharyngeal stages WNL  Recommendations:  Diet: Regular  Liquid: Thin  Meds: As tolerated and desired  Takes several at once with water  Supervision: None  Positioning:Upright  Pt to take PO/Meds only when fully alert and upright  Oral care  Aspiration precautions  Reflux precautions  Eval only, No f/u tx indicated  Consider consult w/:  Nutrition    H&P/Admit info/ pertinent provider notes: (PMH noted above)  Chief Complaint:   Left facial droop  Acute on chronic        History of Present Illness:     Dimas Diamond is a 70 y o  female who presents with   Acute on chronic left facial droop  Patient had asymptomatic left internal carotid stenosis noted during a previous stroke work-up  She went for elective left carotid endarterectomy repair in January 2023  The surgery needed to be aborted because she had abnormal anatomy  After the surgery she did have some mandibular nerve palsy and left facial droop  It is improving but still present  She has had that for the last month    Today Occupational Therapy came to the house and felt it looked much worse that she was talking worse than normal   No known onset of the symptoms per the patient  She did not notice anything different  No headache  No other sided weakness  No difficulty swallowing  Speech is improved     Per neuro:  L facial numbness and asymmetry, subacute to chronic, since at least 1/31/23, with some fluctuations since that time  (Also had transient L facial anesthesia 9/2022 prior to the aborted L CEA which was attributed to migraine )  -Not a candidate for IV TNK because symptoms subacute and she is on anticoagulation  Not a candidate for mechanical thrombectomy because symptoms are subacute and no acute proximal LVO  L > R distal UE paresthesias and numbness, subacute to chronic  +Tinel's signs b/l  Reports similar prior sx especially when waking up from sleep  Suspicious for median neuropathies  Also seen by neurology for L hemianesthesia 9/1/2022 and MRI brain did not show acute cerebral ischemia  L cervical ICA stenosis on ASA 81mg daily; s/p aborted L CEA on 2/28/08, complicated by L neck hematoma / L hypoglossal palsy, resolved; with residual L facial weakness and numbness with mandibular nerve palsy; plan for consideration of TCAR  Atrial fibrillation on anticoagulation with apixaban  Multiple other complaints including HA, rhinorrhea, nausea, L sided chest pressure since last night (new for her) and distal LLE pain, also involving pain behind the L knee  Special Studies:  CT brain 2/20/2023 negative acute  CTA stroke alert 2/20/2023  Once again identified is a severe high-grade stenosis at the origin of the left internal carotid artery in this patient with recently attempted carotid endarterectomy  This appears similar to the prior examination  Above the internal carotid artery   bulb the vessel is normal in caliber with the exception of mild narrowing of the periophthalmic segment intracranially  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    Postoperative change is seen within the left neck including small low density collection at the level of the bifurcation and tracking inferiorly, within the carotid space anterior to the common carotid artery may represent small postoperative hematoma  This does not result in significant mass effect upon the adjacent structures  Previous MBS:  None  Other than previous admit patient was also seen at [facility] 9/1/2022  On stroke pathway at that time experiencing some left-sided facial numbness and  slurred speech which led to her admission  On a regular diet and thin liquids  Patient's goal: wants to get her anxiety meds on time    Did the pt report pain? No  If yes, was nursing notified/was it addressed?   Not applicable    Reason for consult:  R/o aspiration  Determine safest and least restrictive diet  New neuro event  Stroke protocol  H/o neurological disease    Precautions:  Fall    Food Allergies:  None   Current Diet:  Regular   Premorbid diet:  Regular   O2 requirement:  None   Social/Prior living  lives alone   Voice/Speech:  Clear and fluent   Follows commands:  Yes   Cognitive status:  Alert and appropriate     Oral mech exam:  Dentition: Partial natural  Secretion management: Noted above  See summary above    Esophageal stage:  H/o GERD    Results d/w:  Pt, nursing

## 2023-02-21 NOTE — ASSESSMENT & PLAN NOTE
Patient is 57year old female, former smoker, with PMH HTN, HLD, pAfib, CAD, s/p PPM, CAMILLE, hx of SMA Thrombosis s/p BES @ [facility], obesity, hx of TIA, and asymptomatic L ICA stenosis  Patient is known to vascular surgery practice and was previously admitted for L CEA on 1/31/23 with Dr Anali Frank, however procedure was aborted due to patient anatomy  Post-operatively patient was noted to have hypoglossal and marginal L mandibular nerve palsy  Hypoglossal nerve palsy resolved, however patient did continue with residual mandibular nerve palsy  She was discharged home, however returned to the ED on 2/20/23 with worsening L sided facial droop  Vascular surgery was consulted for input and to evaluate L CEA site  Imaging/Diagnostics:  2/20/23 CTA Head/Neck:  LEFT EXTRACRANIAL CAROTID SEGMENT: Similar to the prior examination, severe high-grade stenosis at the ICA origin, approximately 80%  Postoperative change is seen within the left neck including small low density collection at the level of the bifurcation and tracking inferiorly, within the carotid space anterior to the common carotid artery may represent small postoperative hematoma  No significant mass effect upon the adjacent structures  Recommendations:  -L CEA incision appears to be healing appropriately  Ecchymosis no longer noted, no redness, warmth or drainage  Site is soft with minimal edema noted, mildly tender to palpation   -Patient continues with L side facial paralysis, however she does feel that it has improved since admission  Denies any other stroke-like symptoms  Eating and drinking without issue   -Of note, patient had reached out to vascular center on 02/08 with similar concerns and felt this was likely due to the fact that she had been laying on left side while sleeping   -Results of CTA reviewed with patient   We discussed that it is possible that her positioning could've contributed to increased swelling and irritation of mandibular nerve   -Neurology input appreciated  Planning for MRI today   -Patient tearful at times during exam, emotional support provided  Patient did have questions regarding plan for R TCAR in the future and was provided information regarding procedure   -Will D/w Dr Alexis Holbrook

## 2023-02-21 NOTE — CONSULTS
Duplicate order  Please see consult completed by Julee Prescott PA-C/Dr Iftikhar Moffett on 2/20/2023  Quinn Delatorre will need follow up in in 6 weeks with neuromuscular AP or attending  At that time, the provider may consider EMG/NCS given subacute b/l hand numbness and paresthesias

## 2023-02-21 NOTE — ASSESSMENT & PLAN NOTE
Wt Readings from Last 3 Encounters:   02/21/23 89 8 kg (198 lb)   02/16/23 87 1 kg (192 lb)   02/10/23 87 5 kg (193 lb)     · Echo 2/21/2023 with 60% EF and G1DD  · Continue metoprolol, Lasix, and aldactone on discharge

## 2023-02-21 NOTE — PLAN OF CARE
Problem: Neurological Deficit  Goal: Neurological status is stable or improving  Description: Interventions:  - Monitor and assess patient's level of consciousness, motor function, sensory function, and level of assistance needed for ADLs  - Monitor and report changes from baseline  Collaborate with interdisciplinary team to initiate plan and implement interventions as ordered  - Provide and maintain a safe environment  - Consider seizure precautions  - Consider fall precautions  - Consider aspiration precautions  - Consider bleeding precautions  Outcome: Progressing     Problem: Activity Intolerance/Impaired Mobility  Goal: Mobility/activity is maintained at optimum level for patient  Description: Interventions:  - Assess and monitor patient  barriers to mobility and need for assistive/adaptive devices  - Assess patient's emotional response to limitations  - Collaborate with interdisciplinary team and initiate plans and interventions as ordered  - Encourage independent activity per ability   - Maintain proper body alignment  - Perform active/passive rom as tolerated/ordered  - Plan activities to conserve energy   - Turn patient as appropriate  Outcome: Progressing     Problem: Communication Impairment  Goal: Ability to express needs and understand communication  Description: Assess patient's communication skills and ability to understand information  Patient will demonstrate use of effective communication techniques, alternative methods of communication and understanding even if not able to speak  - Encourage communication and provide alternate methods of communication as needed  - Collaborate with case management/ for discharge needs  - Include patient/family/caregiver in decisions related to communication    Outcome: Progressing     Problem: Potential for Aspiration  Goal: Non-ventilated patient's risk of aspiration is minimized  Description: Assess and monitor vital signs, respiratory status, and labs (WBC)  Monitor for signs of aspiration (tachypnea, cough, rales, wheezing, cyanosis, fever)  - Assess and monitor patient's ability to swallow  - Place patient up in chair to eat if possible  - HOB up at 90 degrees to eat if unable to get patient up into chair   - Supervise patient during oral intake  - Instruct patient/ family to take small bites  - Instruct patient/ family to take small single sips when taking liquids  - Follow patient-specific strategies generated by speech pathologist   Outcome: Progressing  Goal: Ventilated patient's risk of aspiration is minimized  Description: Assess and monitor vital signs, respiratory status, airway cuff pressure, and labs (WBC)  Monitor for signs of aspiration (tachypnea, cough, rales, wheezing, cyanosis, fever)  - Elevate head of bed 30 degrees if patient has tube feeding   - Monitor tube feeding  Outcome: Progressing     Problem: Nutrition  Goal: Nutrition/Hydration status is improving  Description: Monitor and assess patient's nutrition/hydration status for malnutrition (ex- brittle hair, bruises, dry skin, pale skin and conjunctiva, muscle wasting, smooth red tongue, and disorientation)  Collaborate with interdisciplinary team and initiate plan and interventions as ordered  Monitor patient's weight and dietary intake as ordered or per policy  Utilize nutrition screening tool and intervene per policy  Determine patient's food preferences and provide high-protein, high-caloric foods as appropriate  - Assist patient with eating   - Allow adequate time for meals   - Encourage patient to take dietary supplement as ordered  - Collaborate with clinical nutritionist   - Include patient/family/caregiver in decisions related to nutrition    Outcome: Progressing     Problem: PAIN - ADULT  Goal: Verbalizes/displays adequate comfort level or baseline comfort level  Description: Interventions:  - Encourage patient to monitor pain and request assistance  - Assess pain using appropriate pain scale  - Administer analgesics based on type and severity of pain and evaluate response  - Implement non-pharmacological measures as appropriate and evaluate response  - Consider cultural and social influences on pain and pain management  - Notify physician/advanced practitioner if interventions unsuccessful or patient reports new pain  Outcome: Progressing     Problem: INFECTION - ADULT  Goal: Absence or prevention of progression during hospitalization  Description: INTERVENTIONS:  - Assess and monitor for signs and symptoms of infection  - Monitor lab/diagnostic results  - Monitor all insertion sites, i e  indwelling lines, tubes, and drains  - Monitor endotracheal if appropriate and nasal secretions for changes in amount and color  - [place] appropriate cooling/warming therapies per order  - Administer medications as ordered  - Instruct and encourage patient and family to use good hand hygiene technique  - Identify and instruct in appropriate isolation precautions for identified infection/condition  Outcome: Progressing     Problem: SAFETY ADULT  Goal: Patient will remain free of falls  Description: INTERVENTIONS:  - Educate patient/family on patient safety including physical limitations  - Instruct patient to call for assistance with activity   - Consult OT/PT to assist with strengthening/mobility   - Keep Call bell within reach  - Keep bed low and locked with side rails adjusted as appropriate  - Keep care items and personal belongings within reach  - Initiate and maintain comfort rounds  - Make Fall Risk Sign visible to staff  - Offer Toileting every 3 Hours, in advance of need  - Initiate/Maintain bed alarm  - Obtain necessary fall risk management equipment:   - Apply yellow socks and bracelet for high fall risk patients  - Consider moving patient to room near nurses station  Outcome: Progressing  Goal: Maintain or return to baseline ADL function  Description: INTERVENTIONS:  -  Assess patient's ability to carry out ADLs; assess patient's baseline for ADL function and identify physical deficits which impact ability to perform ADLs (bathing, care of mouth/teeth, toileting, grooming, dressing, etc )  - Assess/evaluate cause of self-care deficits   - Assess range of motion  - Assess patient's mobility; develop plan if impaired  - Assess patient's need for assistive devices and provide as appropriate  - Encourage maximum independence but intervene and supervise when necessary  - Involve family in performance of ADLs  - Assess for home care needs following discharge   - Consider OT consult to assist with ADL evaluation and planning for discharge  - Provide patient education as appropriate  Outcome: Progressing  Goal: Maintains/Returns to pre admission functional level  Description: INTERVENTIONS:  - Perform BMAT or MOVE assessment daily    - Set and communicate daily mobility goal to care team and patient/family/caregiver  - Collaborate with rehabilitation services on mobility goals if consulted  - Perform Range of Motion 3 times a day  - Reposition patient every 2 hours    - Dangle patient 3 times a day  - Stand patient 3 times a day  - Ambulate patient 3 times a day  - Out of bed to chair 3 times a day   - Out of bed for meals 3 times a day  - Out of bed for toileting  - Record patient progress and toleration of activity level   Outcome: Progressing     Problem: DISCHARGE PLANNING  Goal: Discharge to home or other facility with appropriate resources  Description: INTERVENTIONS:  - Identify barriers to discharge w/patient and caregiver  - Arrange for needed discharge resources and transportation as appropriate  - Identify discharge learning needs (meds, wound care, etc )  - Arrange for interpretive services to assist at discharge as needed  - Refer to Case Management Department for coordinating discharge planning if the patient needs post-hospital services based on physician/advanced practitioner order or complex needs related to functional status, cognitive ability, or social support system  Outcome: Progressing     Problem: Knowledge Deficit  Goal: Patient/family/caregiver demonstrates understanding of disease process, treatment plan, medications, and discharge instructions  Description: Complete learning assessment and assess knowledge base    Interventions:  - Provide teaching at level of understanding  - Provide teaching via preferred learning methods  Outcome: Progressing

## 2023-02-21 NOTE — PROGRESS NOTES
[facility]  Progress Note Michi Coma 1960, 70 y o  female MRN: [id number]  Unit/Bed#: E4 -01 Encounter: [id number]  Primary Care Provider: Loraine Villasenor DO   Date and time admitted to hospital: 2/20/2023  1:38 PM    * Facial droop  Assessment & Plan  · Presented with left facial droop per patients home occupational therapist who reported it looked worse  · See CEA hx plan below - left facial numbness and asymmetry which appears to be subacute on chronic  Improving since admission  · Patient also with L > R upper extremities paresthesias which appear to be median neuropathy in origin, as well as significant shooting pain in left lower leg consistent with sciatica vs diffuse PAD as noted throughout femoral popliteal arteries w/o significant focal stenosis on imaging 3/2022  · Admitted for stroke work up  · Echo - 60% EF with G1DD  · Continue asp and statin  · Appreciate neurology recommendations  · CT head negative for intracranial abnormalities  · Appreciate vascular recommendations  · CTA head/neck severe high-grade stenosis at the origin of the left internal carotid artery in this patient with recently attempted CEA postoperative changes of the left neck without significant mass effect  · Pending MRI  · Neuro checks  · Appreciate PT/OT and speech evals - continue home with [facility]    Carotid artery stenosis  Assessment & Plan  · S/p left CEA 1/31/2023 however procedure was aborted due to patient anatomy    She was 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  · Appreciate vascular recommendations    Hypertensive heart disease with congestive heart failure [facility])  Assessment & Plan  Wt Readings from Last 3 Encounters:   02/21/23 89 8 kg (198 lb)   02/16/23 87 1 kg (192 lb)   02/10/23 87 5 kg (193 lb)     · Echo 2/21/2023 with 60% EF and G1DD  · Patient appears euvolemic  · Continue metoprolol, Lasix, and aldactone  · I/O, dw    Paroxysmal atrial fibrillation (HCC)  Assessment & Plan  · Continue Eliquis and metoprolol    Benign essential hypertension  Assessment & Plan  · Continue Lasix, metoprolol, and Aldactone  · With reports of elevated blood pressure at home with left-sided chest pain Sunday morning  · Recent stress echo 1/2023 with no evidence of induced myocardial ischemia  · Troponin x3 negative, patient currently without chest pain  · SBP averaging 120-130 while inpatient  · CP likely related to hypertension and stress response, continue to monitor  · Telemetry without significant events  · Continue telemetry monitoring    COPD (chronic obstructive pulmonary disease) (HCC)  Assessment & Plan  · Not in acute exacerbation  · Continue home inhalers    Hyperlipidemia  Assessment & Plan  · Patient recently started on Repatha twice monthly due to myalgias on statin medications   · Started on Lipitor 40 mg here  · Lipid panel with total 249, triglycerides 322, , continue Lipitor and monitor SE's    VTE Pharmacologic Prophylaxis: VTE Score: 3 Moderate Risk (Score 3-4) - Pharmacological DVT Prophylaxis Ordered: apixaban (Eliquis)  Patient Centered Rounds: I performed bedside rounds with nursing staff today  Discussions with Specialists or Other Care Team Provider: Neurology, Vascular    Total Time Spent on Date of Encounter in care of patient: 45 minutes This time was spent on one or more of the following: performing physical exam; counseling and coordination of care; obtaining or reviewing history; documenting in the medical record; reviewing/ordering tests, medications or procedures; communicating with other healthcare professionals and discussing with patient's family/caregivers      Current Length of Stay: 1 day(s)  Current Patient Status: Inpatient   Certification Statement: The patient will continue to require additional inpatient hospital stay due to MRI imaging  Discharge Plan: Anticipate discharge in 24-48 hrs to home with home services  Code Status: Level 1 - Full Code    Subjective:   Patient seen and examined  Reports that her facial numbness and tingling feels mildly improved from yesterday  She reports that she had trouble talking yesterday but without difficulty today  She is eating and drinking without difficulty  Denies chest pain, heart palpitations, or shortness of breath at this time  Denies any new areas of weakness  She does have sharp shooting pain chronically that radiates from her left lower leg and up  Denies new weakness in this leg  Objective:     Vitals:   Temp (24hrs), Av 1 °F (36 2 °C), Min:95 9 °F (35 5 °C), Max:97 5 °F (36 4 °C)    Temp:  [95 9 °F (35 5 °C)-97 5 °F (36 4 °C)] 97 4 °F (36 3 °C)  HR:  [53-62] 58  Resp:  [13-20] 18  BP: (110-156)/(46-87) 119/68  SpO2:  [92 %-94 %] 94 %  Body mass index is 33 99 kg/m²  Input and Output Summary (last 24 hours):   No intake or output data in the 24 hours ending 23 1600    Physical Exam:   Physical Exam  Vitals and nursing note reviewed  Constitutional:       General: She is not in acute distress  Appearance: Normal appearance  She is well-developed  She is not ill-appearing  HENT:      Head: Normocephalic and atraumatic  Eyes:      Conjunctiva/sclera: Conjunctivae normal    Neck:      Comments: Healing left CEA scar  Cardiovascular:      Rate and Rhythm: Normal rate and regular rhythm  Pulses:           Radial pulses are 2+ on the right side and 2+ on the left side  Dorsalis pedis pulses are 2+ on the right side and 2+ on the left side  Heart sounds: No murmur heard  Pulmonary:      Effort: Pulmonary effort is normal  No respiratory distress  Breath sounds: Normal breath sounds  No wheezing, rhonchi or rales  Abdominal:      General: Bowel sounds are normal       Palpations: Abdomen is soft  Tenderness: There is no abdominal tenderness  Musculoskeletal:      Cervical back: Neck supple        Right lower leg: No edema  Left lower leg: No edema  Skin:     General: Skin is warm and dry  Neurological:      General: No focal deficit present  Mental Status: She is alert and oriented to person, place, and time  Comments: Mild asymmetry when smiling secondary to facial/mandibular swelling  Normal tongue protrusion  B/l symmetry noted on cheeks/forehead  Sensation intact to light touch b/l lower extremities   Extremities warm to touch b/l   Psychiatric:         Mood and Affect: Mood normal           Additional Data:     Labs:  Results from last 7 days   Lab Units 02/20/23  1403   WBC Thousand/uL 6 62   HEMOGLOBIN g/dL 12 7   HEMATOCRIT % 37 9   PLATELETS Thousands/uL 233     Results from last 7 days   Lab Units 02/20/23  1403   SODIUM mmol/L 135   POTASSIUM mmol/L 4 7   CHLORIDE mmol/L 101   CO2 mmol/L 26   BUN mg/dL 22   CREATININE mg/dL 0 66   ANION GAP mmol/L 8   CALCIUM mg/dL 9 4   GLUCOSE RANDOM mg/dL 94     Results from last 7 days   Lab Units 02/20/23  1403   INR  1 14     Results from last 7 days   Lab Units 02/20/23  1350   POC GLUCOSE mg/dl 93       Lines/Drains:  Invasive Devices     Peripheral Intravenous Line  Duration           Peripheral IV 02/20/23 Distal;Left;Upper;Ventral (anterior) Arm 1 day              Telemetry:  Telemetry Orders (From admission, onward)             48 Hour Telemetry Monitoring  Continuous x 48 hours        References:    Telemetry Guidelines   Question:  Reason for 48 Hour Telemetry  Answer:  Acute CVA (<24 hrs old, hemispheric strokes, selected brainstem strokes, cardiac arrhythmias)                 Telemetry Reviewed: Normal Sinus Rhythm  Indication for Continued Telemetry Use: Acute CVA    Imaging: Reviewed radiology reports from this admission including: CT head, ECHO and CTA head/neck    Recent Cultures (last 7 days):     Last 24 Hours Medication List:   Current Facility-Administered Medications   Medication Dose Route Frequency Provider Last Rate   • acetaminophen  650 mg Oral Q6H PRN Cyndi Penn PA-C     • apixaban  5 mg Oral BID Marta Columbia Prechtel, DO     • vitamin C  1,000 mg Oral Daily Willi S Prechtel, DO     • aspirin  81 mg Oral Daily Willi S Prechtel, DO     • atorvastatin  40 mg Oral QPM Willi S Prechtel, DO     • butalbital-acetaminophen-caffeine  1 tablet Oral Q6H PRN Marta Columbia Prechtel, DO     • Evolocumab  1 mL Subcutaneous Q14 Days Willi S Prechtel, DO     • famotidine  20 mg Oral HS Willi S Prechtel, DO     • fluticasone-vilanterol  1 puff Inhalation Daily Willi S Prechtel, DO     • furosemide  40 mg Oral Daily Willi S Prechtel, DO     • Galcanezumab-gnlm  120 mg Subcutaneous Q30 Days Willi S Prechtel, DO     • hydrOXYzine HCL  50 mg Oral HS Willi S Prechtel, DO     • LORazepam  1 mg Oral Q6H PRN Martazelalem Jackson Prechtel, DO     • metoprolol succinate  100 mg Oral BID Willi S Prechtel, DO     • montelukast  10 mg Oral HS Willi S Prechtel, DO     • ondansetron  4 mg Intravenous Q6H PRN Cyndi Penn PA-C     • pantoprazole  40 mg Oral Early Morning Willi S Prechtel, DO     • phenazopyridine  200 mg Oral TID PRN Marta Jackson Prechtel, DO     • spironolactone  25 mg Oral BID Willi S Prechtel, DO     • traMADol  100 mg Oral Q8H PRN Kaycee Menezes, DO          Today, Patient Was Seen By: Quyen Simental PA-C    **Please Note: This note may have been constructed using a voice recognition system  **

## 2023-02-21 NOTE — ASSESSMENT & PLAN NOTE
· Patient recently started on Repatha twice monthly due to myalgias on statin medications   · Started on Lipitor 40 mg here  · Lipid panel with hyperlipidemia, continue Lipitor and monitor SE's

## 2023-02-21 NOTE — PLAN OF CARE
Problem: PHYSICAL THERAPY ADULT  Goal: Performs mobility at highest level of function for planned discharge setting  See evaluation for individualized goals  Description: Treatment/Interventions: Functional transfer training, LE strengthening/ROM, Therapeutic exercise, Elevations, Endurance training, Patient/family training, Bed mobility, Gait training, Spoke to nursing, OT  Equipment Recommended:  (monitor)       See flowsheet documentation for full assessment, interventions and recommendations  Note: Prognosis: Good  Problem List: Decreased strength, Decreased endurance, Impaired balance, Decreased mobility, Pain  Assessment: Pt 70 y o  female admitted to [address] [place] [address] on February 20, 2023 for Facial droop  PT eval and tx order placed PTA, pt was independent w/ all functional mobility w/ no AD, has 1 XIOMARA, lives alone in single level apartment and retired  Past Hx includes attempted left CEA surgery on 1/31/2023 but aborted w/ resulting in post-op left lower manibular palsy; hx carotid artery stenosis, COPD, a-fib, and HTN  Lori Bhatti Upon evaluation, pt exhibits weakness, impaired balance, decreased endurance, gait deviations, pain, decreased activity tolerance, decreased functional mobility tolerance, altered sensation and fall risk as noted in flow sheet  Pt demonstrated bed mobility w/ Mod I and transfers w/ Supervision requiring verbal cuing for proper mechanics and safety  Pt was able to ambulate using no assistive device w/ Supervision   Observable gait deviations include decreased speed, antalgic, wide MEÑO, decreased foot clearance, decreased heel strike and decreased toe off  During ambulation, no gross LOB and no complaints of dizziness, lightheadedness or SOB  The above mentioned impairments limit pt's ability for independent functional mobility hence will benefit from skilled PT during hospital stay to improve function   Pt is appropriate for restorative therapy to progress towards baseline function while receiving acute care services  The patient's AM-PAC Basic Mobility Inpatient Short Form Raw Score is 20  A Raw score of greater than 16 suggests the patient may benefit from discharge to home  Please also refer to the recommendation of the Physical Therapist for safe discharge planning  PT will recommend home with HHPT vs OPPT upon d/c from acute care once medically managed to improve functional mobility to baseline  Education provided to pt regarding importance of PT  Continue to encourage mobilization w/ nursing including OOB for meals as tolerated to prevent further functional decline  Nursing notified  Pt tolerated session well  Pt at end of session, in stable condition, sitting at bedside chair with all needs within reach  Co-eval with OT necessary for pt's best interest and medical complexity  PT Discharge Recommendation: Home with home health rehabilitation    See flowsheet documentation for full assessment

## 2023-02-21 NOTE — CASE MANAGEMENT
Case Management Assessment & Discharge Planning Note    Patient name Dimas Castro [facility] Lu[address] Bennett [address] 452/E4 Luite Bennett 87 200-* MRN [id number]  : 1960 Date 2023       Current Admission Date: 2023  Current Admission Diagnosis:Facial droop   Patient Active Problem List    Diagnosis Date Noted   • Facial droop 2023   • Numbness 2023   • Morbid obesity ([address] ) 02/10/2023   • Left lower quadrant abdominal pain 02/10/2023   • Partial facial nerve palsy 2023   • Lower abdominal pain 2022   • Hepatic steatosis 2022   • Premature atrial contractions 2022   • Asymptomatic stenosis of left carotid artery 10/05/2022   • Stenosis of left carotid artery 2022   • Carotid artery stenosis 2022   • Stroke-like symptoms 2022   • Localized swelling of left lower extremity 2022   • Acute CVA (cerebrovascular accident) ([facility] ) 2022   • Recurrent UTI 2022   • Pain of upper abdomen 2022   • Left upper quadrant abdominal pain 2022   • S/P appendectomy 2022   • Appendix disease 2022   • Urinary retention 2022   • Peripheral vascular disease ([address] ) 2022   • Hematoma 11/15/2021   • Continuous opioid dependence ([address] ) 11/15/2021   • 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   • Hypoxia 2021   • Chronic bronchitis, unspecified chronic bronchitis type ([address] ) 2021   • Left arm pain 2021   • COPD (chronic obstructive pulmonary disease) ([address] ) 2021   • Diverticulitis 2021   • Left upper quadrant pain 2021   • Moderate persistent asthma without complication    • Vitamin B12 deficiency 2021   • Unspecified diastolic (congestive) heart failure ([address] ) 2021   • Shortness of breath 2021   • Hypertensive heart disease with congestive heart failure ([facility] ) 2020   • CAMILLE (obstructive sleep apnea)    • Acute pyelonephritis 2020   • Lumbar radiculopathy 10/01/2020   • Chronic fatigue 10/01/2020   • Wheezing 09/21/2020   • Injury of toe on right foot 09/01/2020   • Patellar tendinitis of left knee 07/31/2020   • Hair loss 07/09/2020   • [place] (dyspnea on exertion) 06/11/2020   • Vaginal candidiasis 04/09/2020   • Paroxysmal atrial fibrillation (HCC) 03/19/2020   • Low TSH level 02/06/2020   • Other chest pain 01/27/2020   • Vasomotor rhinitis 01/25/2020   • Allergic conjunctivitis of both eyes 01/22/2020   • Intrinsic atopic dermatitis 01/22/2020   • Angio-edema 01/22/2020   • Gastroesophageal reflux disease without esophagitis 08/12/2019   • Tinea corporis 07/15/2019   • Acute gastric ulcer without hemorrhage or perforation 06/12/2019   • Palpitations and chest pain   04/02/2019   • Intertrigo 04/02/2019   • Allergic reaction 12/03/2018   • Insomnia 11/16/2018   • Snoring 11/16/2018   • Cervicalgia 11/16/2018   • Headache 11/16/2018   • AMD (age-related macular degeneration), bilateral 11/16/2018   • Anxiety 10/19/2018   • Angina pectoris ([address] ) 11/15/2017   • Female pelvic pain 09/25/2017   • Arthritis 08/01/2017   • Venous insufficiency 08/01/2017   • Tick bite 07/14/2017   • Acute upper respiratory infection 02/24/2017   • Attention disturbance 01/26/2017   • Familial hyperlipidemia 12/02/2016   • Allergic rhinitis 10/14/2016   • Anxiety and depression 10/14/2016   • Asthma due to seasonal allergies 10/14/2016   • Benign essential hypertension 10/14/2016   • Interstitial cystitis 10/14/2016   • Chronic low back pain 10/14/2016   • Elevated antinuclear antibody (SON) level 10/14/2016   • Elevated blood sugar 10/14/2016   • Hyperlipidemia 10/14/2016   • Migraines 10/14/2016   • Essential hypertension 08/13/2015   • Lipid disorder 08/13/2015   • Tobacco abuse 08/13/2015   • Obesity (BMI 30-39 9) 03/24/2011      LOS (days): 1  Geometric Mean LOS (GMLOS) (days): 2 80  Days to GMLOS:1 8     OBJECTIVE:  PATIENT READMITTED TO HOSPITAL  Risk of Unplanned Readmission Score: 25 13 Current admission status: Inpatient       Preferred Pharmacy:   [address] [facility] [address] [place] [address]  Phone: [phone] Fax: [phone]    [address], [address] [facility] [address]  [address] [address]  Phone: [phone] Fax: [phone]    [facility] [address], [address]  [address] [facility] [address]  Phone: [phone] Fax: [address], [place] - [address] ,  [address] ,  [address]  Phone: [phone] Fax: [phone]    [address] [place] [address] - [address]  [facility]  [address]  [facility] [address]  Phone: [phone] Fax: [phone]    Primary Care Provider: Chacha Cochran DO    Primary Insurance: MEDICARE  Secondary Insurance: [address] [place] [address]    ASSESSMENT:  Active Health Care Proxies    There are no active Health Care Proxies on file         Advance Directives  Does patient have a [address]?: No  Was patient offered paperwork?: Yes (Explain/gave information)  Does patient currently have a Health Care decision maker?: Yes, please see Health Care Proxy section  Primary Contact: Demi Obrien (Friend)   [address]   [phone] (M)         Readmission Root Cause  30 Day Readmission: Yes  Who directed you to return to the hospital?:  (Self)  Did you understand whom to contact if you had questions or problems?: Yes  Were you able to get your prescriptions filled when you left the hospital?: Yes  Did you take your medications as prescribed?: Yes  Were you able to get to your follow-up appointments?: No  Reason[de-identified] Readmitted prior to appointment  During previous admission, was a post-acute recommendation made?: No  Patient was readmitted due to: Facial Magdy  Action Plan: Continue with [address] [place] [address]     Patient Information  Admitted from[de-identified] Home  Mental Status: Alert  During Assessment patient was accompanied by: Not accompanied during assessment  Assessment information provided by[de-identified] Patient  Primary Caregiver: Self  Support Systems: [place] of Residence: [facility] do you live in?: [address] [place] [address] entry access options   Select all that apply : Stairs  Number of steps to enter home :  (7 in the front and 1 in the back)  Do the steps have railings?: No  Type of Current Residence: Apartment  Upon entering residence, is there a bedroom on the main floor (no further steps)?: Yes  Upon entering residence, is there a bathroom on the main floor (no further steps)?: Yes  In the last 12 months, was there a time when you were not able to pay the mortgage or rent on time?: No  In the last 12 months, how many places have you lived?: 1  In the last 12 months, was there a time when you did not have a steady place to sleep or slept in a shelter (including now)?: No  Homeless/housing insecurity resource given?: N/A  Living Arrangements: Lives Alone  Is patient a ?: No    Activities of Daily Living Prior to Admission  Functional Status: Independent  Completes ADLs independently?: Yes  Ambulates independently?: Yes  Does patient use assisted devices?: No  Does patient currently own DME?: Yes  What DME does the patient currently own?: Walker, Bedside [employer]  Does patient have a history of Outpatient Therapy (PT/OT)?: Yes  Does the patient have a history of Short-Term Rehab?: No  Does patient have a history of HH?: Yes  Does patient currently have [place] [address]?: Yes    Current Home Health Care  Type of Current Home Care Services: Home PT, Home OT, Nurse visit  [address][de-identified] [facility] Provider[de-identified] PCP    Patient Information Continued  Income Source: Pension/skilled nursing  Does patient have prescription coverage?: Yes  Within the past 12 months, you worried that your food would run out before you got the money to buy more : Never true  Within the past 12 months, the food you bought just didn't last and you didn't have money to get more : Never true  Food insecurity resource given?: N/A  Does patient receive dialysis treatments?: No  Does patient have a history of substance abuse?: No  Does patient have a history of Mental Health Diagnosis?: Yes (Anxiety and Depression)         Means of Transportation  Means of Transport to Appts[de-identified] Drives Self  In the past 12 months, has lack of transportation kept you from medical appointments or from getting medications?: No  In the past 12 months, has lack of transportation kept you from meetings, work, or from getting things needed for daily living?: No  Was application for public transport provided?: N/A        DISCHARGE DETAILS:    Discharge planning discussed with[de-identified] pt  Freedom of Choice: Yes  Comments - Freedom of Choice: Agreement with [facility] back to [facility]  CM contacted family/caregiver?: No- see comments (Pt will keep her friend informed )  Were Treatment Team discharge recommendations reviewed with patient/caregiver?: Yes  Did patient/caregiver verbalize understanding of patient care needs?: Yes  Were patient/caregiver advised of the risks associated with not following Treatment Team discharge recommendations?: Yes         Requested [address]         Is the patient interested in [place] [address] at discharge?: Yes  [address] requested[de-identified] Nursing, Occupational Therapy, Physical [address] Name[de-identified] [facility] Provider[de-identified] PCP  Home Health Services Needed[de-identified] Evaluate Functional Status and Safety, Gait/ADL Training, Strengthening/Theraputic Exercises to Improve Function, Other (comment) (Medical Management)  Homebound Criteria Met[de-identified] Requires the Assistance of Another Person for Safe Ambulation or to Leave the Home  Supporting Clincal Findings[de-identified] Fatigues Easliy in Short Distances, Limited Endurance                   Treatment Team Recommendation: Home with [address]  Discharge Destination Plan[de-identified] Home with [address] [place] [employer]                                         Additional Comments: Pt is open with [facility] and made referral back to them

## 2023-02-21 NOTE — ASSESSMENT & PLAN NOTE
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

## 2023-02-21 NOTE — ASSESSMENT & PLAN NOTE
· Continue Lasix, metoprolol, and Aldactone  · With reports of elevated blood pressure at home with left-sided chest pain Sunday morning  · Recent stress echo 1/2023 with no evidence of induced myocardial ischemia  · Troponin x3 negative, patient currently without chest pain  · CP likely related to hypertension and stress response, continue to monitor  · Telemetry without significant events  · Continue telemetry monitoring

## 2023-02-21 NOTE — ASSESSMENT & PLAN NOTE
· Presented with left facial droop per patients home occupational therapist who reported it looked worse  · See CEA hx plan below - left facial numbness and asymmetry which appears to be subacute on chronic   Improved since admission  · Patient also with L > R upper extremities paresthesias which appear to be median neuropathy in origin, as well as significant shooting pain in left lower leg consistent with sciatica vs diffuse PAD as noted throughout femoral popliteal arteries w/o significant focal stenosis on imaging 3/2022  · Admitted for stroke work up  · Echo - 60% EF with G1DD  · CT head negative for intracranial abnormalities  · CTA head/neck severe high-grade stenosis at the origin of the left internal carotid artery in this patient with recently attempted CEA postoperative changes of the left neck without significant mass effect  · MRI without evidence of acute infarction or ischemia   · PT/OT - HHR  · Patient will have follow up with neurology and vascular outpatient for further testing/ follow up - discussed with her

## 2023-02-21 NOTE — ASSESSMENT & PLAN NOTE
· S/p left CEA 1/31/2023 however procedure was aborted due to patient anatomy    She was 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  · Vascular follow up outpatient

## 2023-02-21 NOTE — CASE COMMUNICATION
OT assisted patient with contacting 911 and patient was transported to [facility] for medical assessment via ambulance  Patient reported her blood pressure was 154/135 this morning around 11:30 am   OT performed blood pressure assessment at the start of visit and blood pressure was  160/100  OT performed pulse oximetry and HR was initially good however resting heart rate decreased to  38 bpm during visit  OT confirmed  resting heart rate with apical assessment  Patient was reporting some shortness of breath and pressure type pain in her left abdomen  Patient reported she was sure the feeling was gas related as she was unable to belch  OT contacted Dr Azeb rCocker office and spoke with Rory Mayen regarding BP/ HR and recommendation for patient to be seen at ER for additional assessment  Rory Mayen indicated Dr Tato Gates was in agreement with recommendation for shazia mahmood to be seen at ER  No exercises performed on this visit due to elevated BP and decreased HR

## 2023-02-21 NOTE — ASSESSMENT & PLAN NOTE
· Continue Lasix, metoprolol, and Aldactone  · With reports of elevated blood pressure at home with left-sided chest pain Sunday morning  · Recent stress echo 1/2023 with no evidence of induced myocardial ischemia  · Troponin x3 negative, patient currently without chest pain  · SBP averaging 120-130 while inpatient  · CP likely related to hypertension and stress response, continue to monitor  · Telemetry without significant events  · Follow up up with cardiology

## 2023-02-22 VITALS
RESPIRATION RATE: 18 BRPM | HEIGHT: 64 IN | TEMPERATURE: 97 F | BODY MASS INDEX: 32.22 KG/M2 | WEIGHT: 188.71 LBS | HEART RATE: 65 BPM | DIASTOLIC BLOOD PRESSURE: 74 MMHG | OXYGEN SATURATION: 94 % | SYSTOLIC BLOOD PRESSURE: 151 MMHG

## 2023-02-22 DIAGNOSIS — R00.2 PALPITATIONS: ICD-10-CM

## 2023-02-22 DIAGNOSIS — I10 ESSENTIAL HYPERTENSION: ICD-10-CM

## 2023-02-22 LAB
EST. AVERAGE GLUCOSE BLD GHB EST-MCNC: 97 MG/DL
HBA1C MFR BLD: 5 %

## 2023-02-22 RX ORDER — METOPROLOL SUCCINATE 100 MG/1
100 TABLET, EXTENDED RELEASE ORAL 2 TIMES DAILY
Qty: 180 TABLET | Refills: 0 | Status: SHIPPED | OUTPATIENT
Start: 2023-02-22

## 2023-02-22 RX ORDER — DILTIAZEM HYDROCHLORIDE 120 MG/1
CAPSULE, EXTENDED RELEASE ORAL
Qty: 30 CAPSULE | Refills: 0 | Status: SHIPPED | OUTPATIENT
Start: 2023-02-22

## 2023-02-22 RX ADMIN — SPIRONOLACTONE 25 MG: 25 TABLET, FILM COATED ORAL at 08:39

## 2023-02-22 RX ADMIN — FLUTICASONE FUROATE AND VILANTEROL TRIFENATATE 1 PUFF: 200; 25 POWDER RESPIRATORY (INHALATION) at 08:43

## 2023-02-22 RX ADMIN — METOPROLOL SUCCINATE 100 MG: 50 TABLET, EXTENDED RELEASE ORAL at 08:39

## 2023-02-22 RX ADMIN — OXYCODONE HYDROCHLORIDE AND ACETAMINOPHEN 1000 MG: 500 TABLET ORAL at 08:40

## 2023-02-22 RX ADMIN — TRAMADOL HYDROCHLORIDE 100 MG: 50 TABLET, COATED ORAL at 04:52

## 2023-02-22 RX ADMIN — PANTOPRAZOLE SODIUM 40 MG: 40 TABLET, DELAYED RELEASE ORAL at 06:58

## 2023-02-22 RX ADMIN — LORAZEPAM 1 MG: 1 TABLET ORAL at 04:52

## 2023-02-22 RX ADMIN — DICYCLOMINE HYDROCHLORIDE 10 MG: 10 CAPSULE ORAL at 08:40

## 2023-02-22 RX ADMIN — APIXABAN 5 MG: 5 TABLET, FILM COATED ORAL at 08:40

## 2023-02-22 RX ADMIN — ACETAMINOPHEN 325MG 650 MG: 325 TABLET ORAL at 08:39

## 2023-02-22 RX ADMIN — FUROSEMIDE 40 MG: 40 TABLET ORAL at 08:40

## 2023-02-22 RX ADMIN — ASPIRIN 81 MG: 81 TABLET, CHEWABLE ORAL at 08:40

## 2023-02-22 RX ADMIN — ONDANSETRON 4 MG: 4 TABLET, ORALLY DISINTEGRATING ORAL at 04:52

## 2023-02-22 NOTE — DISCHARGE SUMMARY
[address]  Discharge- Gala Ngo 1960, 70 y o  female MRN: [id number]  Unit/Bed#: E4 -01 Encounter: [id number]  Primary Care Provider: Lennox Heather, DO   Date and time admitted to hospital: 2/20/2023  1:38 PM    * Facial droop  Assessment & Plan  · Presented with left facial droop per patients home occupational therapist who reported it looked worse  · See CEA hx plan below - left facial numbness and asymmetry which appears to be subacute on chronic  Improved since admission  · Patient also with L > R upper extremities paresthesias which appear to be median neuropathy in origin, as well as significant shooting pain in left lower leg consistent with sciatica vs diffuse PAD as noted throughout femoral popliteal arteries w/o significant focal stenosis on imaging 3/2022  · Admitted for stroke work up  · Echo - 60% EF with G1DD  · CT head negative for intracranial abnormalities  · CTA head/neck severe high-grade stenosis at the origin of the left internal carotid artery in this patient with recently attempted CEA postoperative changes of the left neck without significant mass effect  · MRI without evidence of acute infarction or ischemia   · PT/OT - HHR  · Patient will have follow up with neurology and vascular outpatient for further testing/ follow up - discussed with her    Carotid artery stenosis  Assessment & Plan  · S/p left CEA 1/31/2023 however procedure was aborted due to patient anatomy    She was 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  · Vascular follow up outpatient    Hypertensive heart disease with congestive heart failure [facility])  Assessment & Plan  Wt Readings from Last 3 Encounters:   02/21/23 89 8 kg (198 lb)   02/16/23 87 1 kg (192 lb)   02/10/23 87 5 kg (193 lb)     · Echo 2/21/2023 with 60% EF and G1DD  · Continue metoprolol, Lasix, and aldactone on discharge    Paroxysmal atrial fibrillation (HCC)  Assessment & Plan  · Continue Eliquis and metoprolol    Benign essential hypertension  Assessment & Plan  · Continue Lasix, metoprolol, and Aldactone  · With reports of elevated blood pressure at home with left-sided chest pain Sunday morning  · Recent stress echo 1/2023 with no evidence of induced myocardial ischemia  · Troponin x3 negative, patient currently without chest pain  · SBP averaging 120-130 while inpatient  · CP likely related to hypertension and stress response, continue to monitor  · Telemetry without significant events  · Follow up up with cardiology    COPD (chronic obstructive pulmonary disease) ([facility] )  Assessment & Plan  · Not in acute exacerbation  · Continue home inhalers    Hyperlipidemia  Assessment & Plan  · Patient recently started on Repatha twice monthly due to myalgias on statin medications   · Started on Lipitor 40 mg here during stroke work up, continue with Heaven Parikh on discharge with Cardiology  · Lipid panel with total 249, triglycerides 322, , continue Lipitor and monitor SE's    Medical Problems     Resolved Problems  Date Reviewed: 2/22/2023   None       Discharging Physician / Practitioner: Javed Cochran PA-C  PCP: Christine Loving DO  Admission Date:   Admission Orders (From admission, onward)     Ordered        02/20/23 1552  INPATIENT ADMISSION  Once                      Discharge Date: 02/22/23    Consultations During Hospital Stay:  · Neurology, Vascular, PT/OT/Speech    Procedures Performed:   · None    Significant Findings / Test Results:     MRI brain wo contrast  Result Date: 2/22/2023  Impression: 1  No acute infarction, intracranial hemorrhage or mass effect  2   Mild, chronic microangiopathy  CT stroke alert brain  Result Date: 2/20/2023  Impression: No acute intracranial abnormality  CT abdomen pelvis w contrast  Result Date: 2/20/2023  Impression: No acute pathology      CTA stroke alert (head/neck)  Result Date: 2/20/2023  Impression: Once again identified is a severe high-grade stenosis at the origin of the left internal carotid artery in this patient with recently attempted carotid endarterectomy  This appears similar to the prior examination  Above the internal carotid artery bulb the vessel is normal in caliber with the exception of mild narrowing of the periophthalmic segment intracranially  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  Postoperative change is seen within the left neck including small low density collection at the level of the bifurcation and tracking inferiorly, within the carotid space anterior to the common carotid artery may represent small postoperative hematoma  This does not result in significant mass effect upon the adjacent structures  Echo complete w/ contrast if indicated  Result Date: 2/21/2023  Narrative: •  Left Ventricle: Left ventricular cavity size is normal  Wall thickness is mildly increased  The left ventricular ejection fraction is 60%  Systolic function is normal  Wall motion is normal  Diastolic function is mildly abnormal, consistent with grade I (abnormal) relaxation  •  Right Ventricle: Right ventricular cavity size is mildly dilated  •  Left Atrium: The atrium is mildly dilated (35-41 mL/m2)  •  Aortic Valve: There is mild to moderate regurgitation with a centrally directed jet  •  Tricuspid Valve: There is mild regurgitation  •  Pulmonic Valve: There is mild regurgitation       Incidental Findings:   · None    Test Results Pending at Discharge (will require follow up):   · Hgb A1C     Outpatient Tests Requested:  · Defer to PCP and follow up appointments    Complications:  None    Reason for Admission: Facial droop    Hospital Course:   Danielle Tucker is a 70 y o  female patient who originally presented to the hospital on 2/20/2023 due to blood pressure and noted left-sided weakness and numbness sensation along with left-sided facial droop per her home occupational therapist   She was seen in consultation by neurology and vascular  She was placed on a stroke work-up which was noted to be negative  She was monitored on telemetry due to chest pain that she mentioned at home without any acute events  Her troponins were negative  She had mild BP elevation prior to arrival likely secondary to her stress/anxiety  She had an aborted left CEA recently which displayed good healing and no concerns from a vascular stand point  It appears that some of her facial symptoms are secondary to a marginal mandibular nerve palsy  She had improvement of her facial/numbness and tingling symptoms on discharge  Had no interventions planned at the time of inpatient and is to follow-up outpatient  She is also to follow-up with neurology and obtain EMG studies due to questionable median neuropathies in her arms  She is also to follow up with PCP/vascular due to left lower leg sciatic vs PAD in the outpatient setting  PT/OT saw patient and suggested HHR  She felt well on the day of discharge and is also going to follow up with her PCP and Cardiologist      Please see above list of diagnoses and related plan for additional information  Condition at Discharge: good    Discharge Day Visit / Exam:   Subjective: Patient seen and examined  Reports she is feeling much better today with improvement in her facial numbness and tingling  She is without chest pain, shortness of breath, new areas of weakness, or difficulty swallowing  She noted a few heart palpitations last night but denies it currently  She is feeling well and ready to go home  Vitals: Blood Pressure: 151/74 (02/22/23 1114)  Pulse: 65 (02/22/23 1114)  Temperature: (!) 97 °F (36 1 °C) (02/22/23 1114)  Temp Source: Temporal (02/22/23 1114)  Respirations: 18 (02/22/23 1114)  Height: 5' 4" (162 6 cm) (02/21/23 0815)  Weight - Scale: 85 6 kg (188 lb 11 4 oz) (02/22/23 0555)  SpO2: 94 % (02/22/23 1114)  Exam:   Physical Exam  Vitals and nursing note reviewed     Constitutional: General: She is not in acute distress  Appearance: Normal appearance  She is well-developed  She is obese  HENT:      Head: Normocephalic and atraumatic  Eyes:      Extraocular Movements: Extraocular movements intact  Conjunctiva/sclera: Conjunctivae normal    Neck:      Comments: L CEA vertical scar without erythema, edema or drainage  Healing well  Cardiovascular:      Rate and Rhythm: Normal rate  Rhythm irregularly irregular  Heart sounds: Normal heart sounds  No murmur heard  Pulmonary:      Effort: Pulmonary effort is normal  No respiratory distress  Breath sounds: Normal breath sounds  No wheezing or rales  Abdominal:      General: Bowel sounds are normal       Palpations: Abdomen is soft  Tenderness: There is no abdominal tenderness  Musculoskeletal:      Right lower leg: No edema  Left lower leg: No edema  Comments: Normal ROM to lower legs b/l   Skin:     General: Skin is warm and dry  Neurological:      General: No focal deficit present  Mental Status: She is alert  Motor: No weakness  Psychiatric:         Mood and Affect: Mood normal         Discussion with Family: Patient updating family  Discharge instructions/Information to patient and family:   See after visit summary for information provided to patient and family  Provisions for Follow-Up Care:  See after visit summary for information related to follow-up care and any pertinent home health orders  Disposition:   Home with R    Planned Readmission: No     Discharge Statement:  I spent 60 minutes discharging the patient  This time was spent on the day of discharge  I had direct contact with the patient on the day of discharge  Greater than 50% of the total time was spent examining patient, answering all patient questions, arranging and discussing plan of care with patient as well as directly providing post-discharge instructions    Additional time then spent on discharge activities  Discharge Medications:  See after visit summary for reconciled discharge medications provided to patient and/or family        **Please Note: This note may have been constructed using a voice recognition system**

## 2023-02-22 NOTE — PLAN OF CARE
Problem: Neurological Deficit  Goal: Neurological status is stable or improving  Description: Interventions:  - Monitor and assess patient's level of consciousness, motor function, sensory function, and level of assistance needed for ADLs  - Monitor and report changes from baseline  Collaborate with interdisciplinary team to initiate plan and implement interventions as ordered  - Provide and maintain a safe environment  - Consider seizure precautions  - Consider fall precautions  - Consider aspiration precautions  - Consider bleeding precautions  Outcome: Progressing     Problem: Activity Intolerance/Impaired Mobility  Goal: Mobility/activity is maintained at optimum level for patient  Description: Interventions:  - Assess and monitor patient  barriers to mobility and need for assistive/adaptive devices  - Assess patient's emotional response to limitations  - Collaborate with interdisciplinary team and initiate plans and interventions as ordered  - Encourage independent activity per ability   - Maintain proper body alignment  - Perform active/passive rom as tolerated/ordered  - Plan activities to conserve energy   - Turn patient as appropriate  Outcome: Progressing     Problem: Communication Impairment  Goal: Ability to express needs and understand communication  Description: Assess patient's communication skills and ability to understand information  Patient will demonstrate use of effective communication techniques, alternative methods of communication and understanding even if not able to speak  - Encourage communication and provide alternate methods of communication as needed  - Collaborate with case management/ for discharge needs  - Include patient/family/caregiver in decisions related to communication    Outcome: Progressing     Problem: Potential for Aspiration  Goal: Non-ventilated patient's risk of aspiration is minimized  Description: Assess and monitor vital signs, respiratory status, and labs (WBC)  Monitor for signs of aspiration (tachypnea, cough, rales, wheezing, cyanosis, fever)  - Assess and monitor patient's ability to swallow  - Place patient up in chair to eat if possible  - HOB up at 90 degrees to eat if unable to get patient up into chair   - Supervise patient during oral intake  - Instruct patient/ family to take small bites  - Instruct patient/ family to take small single sips when taking liquids  - Follow patient-specific strategies generated by speech pathologist   Outcome: Progressing  Goal: Ventilated patient's risk of aspiration is minimized  Description: Assess and monitor vital signs, respiratory status, airway cuff pressure, and labs (WBC)  Monitor for signs of aspiration (tachypnea, cough, rales, wheezing, cyanosis, fever)  - Elevate head of bed 30 degrees if patient has tube feeding   - Monitor tube feeding  Outcome: Progressing     Problem: Nutrition  Goal: Nutrition/Hydration status is improving  Description: Monitor and assess patient's nutrition/hydration status for malnutrition (ex- brittle hair, bruises, dry skin, pale skin and conjunctiva, muscle wasting, smooth red tongue, and disorientation)  Collaborate with interdisciplinary team and initiate plan and interventions as ordered  Monitor patient's weight and dietary intake as ordered or per policy  Utilize nutrition screening tool and intervene per policy  Determine patient's food preferences and provide high-protein, high-caloric foods as appropriate  - Assist patient with eating   - Allow adequate time for meals   - Encourage patient to take dietary supplement as ordered  - Collaborate with clinical nutritionist   - Include patient/family/caregiver in decisions related to nutrition    Outcome: Progressing     Problem: PAIN - ADULT  Goal: Verbalizes/displays adequate comfort level or baseline comfort level  Description: Interventions:  - Encourage patient to monitor pain and request assistance  - Assess pain using appropriate pain scale  - Administer analgesics based on type and severity of pain and evaluate response  - Implement non-pharmacological measures as appropriate and evaluate response  - Consider cultural and social influences on pain and pain management  - Notify physician/advanced practitioner if interventions unsuccessful or patient reports new pain  Outcome: Progressing     Problem: INFECTION - ADULT  Goal: Absence or prevention of progression during hospitalization  Description: INTERVENTIONS:  - Assess and monitor for signs and symptoms of infection  - Monitor lab/diagnostic results  - Monitor all insertion sites, i e  indwelling lines, tubes, and drains  - Monitor endotracheal if appropriate and nasal secretions for changes in amount and color  - [place] appropriate cooling/warming therapies per order  - Administer medications as ordered  - Instruct and encourage patient and family to use good hand hygiene technique  - Identify and instruct in appropriate isolation precautions for identified infection/condition  Outcome: Progressing     Problem: SAFETY ADULT  Goal: Patient will remain free of falls  Description: INTERVENTIONS:  - Educate patient/family on patient safety including physical limitations  - Instruct patient to call for assistance with activity   - Consult OT/PT to assist with strengthening/mobility   - Keep Call bell within reach  - Keep bed low and locked with side rails adjusted as appropriate  - Keep care items and personal belongings within reach  - Initiate and maintain comfort rounds  - Make Fall Risk Sign visible to staff  - Offer Toileting every 3 Hours, in advance of need  - Initiate/Maintain bed alarm  - Obtain necessary fall risk management equipment  - Apply yellow socks and bracelet for high fall risk patients  - Consider moving patient to room near nurses station  Outcome: Progressing  Goal: Maintain or return to baseline ADL function  Description: INTERVENTIONS:  -  Assess patient's ability to carry out ADLs; assess patient's baseline for ADL function and identify physical deficits which impact ability to perform ADLs (bathing, care of mouth/teeth, toileting, grooming, dressing, etc )  - Assess/evaluate cause of self-care deficits   - Assess range of motion  - Assess patient's mobility; develop plan if impaired  - Assess patient's need for assistive devices and provide as appropriate  - Encourage maximum independence but intervene and supervise when necessary  - Involve family in performance of ADLs  - Assess for home care needs following discharge   - Consider OT consult to assist with ADL evaluation and planning for discharge  - Provide patient education as appropriate  Outcome: Progressing  Goal: Maintains/Returns to pre admission functional level  Description: INTERVENTIONS:  - Perform BMAT or MOVE assessment daily    - Set and communicate daily mobility goal to care team and patient/family/caregiver  - Collaborate with rehabilitation services on mobility goals if consulted  - Perform Range of Motion 3 times a day  - Reposition patient every 2 hours    - Dangle patient 3 times a day  - Stand patient 3 times a day  - Ambulate patient 3 times a day  - Out of bed to chair 3 times a day   - Out of bed for meals 3 times a day  - Out of bed for toileting  - Record patient progress and toleration of activity level   Outcome: Progressing     Problem: DISCHARGE PLANNING  Goal: Discharge to home or other facility with appropriate resources  Description: INTERVENTIONS:  - Identify barriers to discharge w/patient and caregiver  - Arrange for needed discharge resources and transportation as appropriate  - Identify discharge learning needs (meds, wound care, etc )  - Arrange for interpretive services to assist at discharge as needed  - Refer to Case Management Department for coordinating discharge planning if the patient needs post-hospital services based on physician/advanced practitioner order or complex needs related to functional status, cognitive ability, or social support system  Outcome: Progressing     Problem: Knowledge Deficit  Goal: Patient/family/caregiver demonstrates understanding of disease process, treatment plan, medications, and discharge instructions  Description: Complete learning assessment and assess knowledge base    Interventions:  - Provide teaching at level of understanding  - Provide teaching via preferred learning methods  Outcome: Progressing

## 2023-02-22 NOTE — ED ATTENDING ATTESTATION
2023  I, Berna Tolbert MD, saw and evaluated the patient  I have discussed the patient with the resident/non-physician practitioner and agree with the resident's/non-physician practitioner's findings, Plan of Care, and MDM as documented in the resident's/non-physician practitioner's note, except where noted  All available labs and Radiology studies were reviewed  I was present for key portions of any procedure(s) performed by the resident/non-physician practitioner and I was immediately available to provide assistance  At this point I agree with the current assessment done in the Emergency Department  I have conducted an independent evaluation of this patient a history and physical is as follows:    71 YO female with Hx of CVA presents with elevated blood pressure and notes Left sided weakness and numbness, tingling sensation and Left sided facial droop  Patient has recent Hx of aborted carotid endarterectomy on Left due to concern for nerve involvement  She has had Left sided facial droop since but possibly worsened  States Left body numbness and weakness potentially began few hours prior to arrival, she does take Eliquis and has been compliant with this  Notes some nausea today  Pt denies CP/SOB/F/C/D/C, no dysuria, burning on urination or blood in urine  Gen: Pt is in NAD  HEENT: Head is atraumatic, EOM's intact, neck has FROM  Chest: CTAB, non-tender  Heart: RRR  Abdomen: Soft, NT/ND  Musculoskeletal: FROM in all extremities  Skin: No rash, no ecchymosis  Neuro: Awake, alert, oriented x4; Mild Left facial droop, primarily noted with smiling, possible mild weakness to the LUE and LLE, no objective sensory deficit  Psych: Normal affect    MDM -  Patient with complaints of Left sided deficit, history of CVA and recent aborted   Stroke alert called though patient is not a candidate for TNK due to using Eliquis      ED Course         Critical Care Time  Procedures

## 2023-02-22 NOTE — CASE MANAGEMENT
Case Management Discharge Planning Note    Patient name Shanna Barnett  Location [place] [address] [address]-* MRN [id number]  : 1960 Date 2023       Current Admission Date: 2023  Current Admission Diagnosis:Facial droop   Patient Active Problem List    Diagnosis Date Noted   • Facial droop 2023   • Numbness 2023   • Morbid obesity ([address] ) 02/10/2023   • Left lower quadrant abdominal pain 02/10/2023   • Partial facial nerve palsy 2023   • Lower abdominal pain 2022   • Hepatic steatosis 2022   • Premature atrial contractions 2022   • Asymptomatic stenosis of left carotid artery 10/05/2022   • Stenosis of left carotid artery 2022   • Carotid artery stenosis 2022   • Stroke-like symptoms 2022   • Localized swelling of left lower extremity 2022   • Acute CVA (cerebrovascular accident) ([facility] ) 2022   • Recurrent UTI 2022   • Pain of upper abdomen 2022   • Left upper quadrant abdominal pain 2022   • S/P appendectomy 2022   • Appendix disease 2022   • Urinary retention 2022   • Peripheral vascular disease ([address] ) 2022   • Hematoma 11/15/2021   • Continuous opioid dependence ([address] ) 11/15/2021   • 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   • Hypoxia 2021   • Chronic bronchitis, unspecified chronic bronchitis type ([address] ) 2021   • Left arm pain 2021   • COPD (chronic obstructive pulmonary disease) ([address] ) 2021   • Diverticulitis 2021   • Left upper quadrant pain 2021   • Moderate persistent asthma without complication    • Vitamin B12 deficiency 2021   • Unspecified diastolic (congestive) heart failure ([address] ) 2021   • Shortness of breath 2021   • Hypertensive heart disease with congestive heart failure ([facility] ) 2020   • CAM[place] (obstructive sleep apnea)    • Acute pyelonephritis 2020   • Lumbar radiculopathy 10/01/2020   • Chronic fatigue 10/01/2020   • Wheezing 09/21/2020   • Injury of toe on right foot 09/01/2020   • Patellar tendinitis of left knee 07/31/2020   • Hair loss 07/09/2020   • [place] (dyspnea on exertion) 06/11/2020   • Vaginal candidiasis 04/09/2020   • Paroxysmal atrial fibrillation (HCC) 03/19/2020   • Low TSH level 02/06/2020   • Other chest pain 01/27/2020   • Vasomotor rhinitis 01/25/2020   • Allergic conjunctivitis of both eyes 01/22/2020   • Intrinsic atopic dermatitis 01/22/2020   • Angio-edema 01/22/2020   • Gastroesophageal reflux disease without esophagitis 08/12/2019   • Tinea corporis 07/15/2019   • Acute gastric ulcer without hemorrhage or perforation 06/12/2019   • Palpitations and chest pain   04/02/2019   • Intertrigo 04/02/2019   • Allergic reaction 12/03/2018   • Insomnia 11/16/2018   • Snoring 11/16/2018   • Cervicalgia 11/16/2018   • Headache 11/16/2018   • AMD (age-related macular degeneration), bilateral 11/16/2018   • Anxiety 10/19/2018   • Angina pectoris ([facility] ) 11/15/2017   • Female pelvic pain 09/25/2017   • Arthritis 08/01/2017   • Venous insufficiency 08/01/2017   • Tick bite 07/14/2017   • Acute upper respiratory infection 02/24/2017   • Attention disturbance 01/26/2017   • Familial hyperlipidemia 12/02/2016   • Allergic rhinitis 10/14/2016   • Anxiety and depression 10/14/2016   • Asthma due to seasonal allergies 10/14/2016   • Benign essential hypertension 10/14/2016   • Interstitial cystitis 10/14/2016   • Chronic low back pain 10/14/2016   • Elevated antinuclear antibody (SON) level 10/14/2016   • Elevated blood sugar 10/14/2016   • Hyperlipidemia 10/14/2016   • Migraines 10/14/2016   • Essential hypertension 08/13/2015   • Lipid disorder 08/13/2015   • Tobacco abuse 08/13/2015   • Obesity (BMI 30-39 9) 03/24/2011      LOS (days): 2  Geometric Mean LOS (GMLOS) (days): 2 80  Days to GMLOS:0 9     OBJECTIVE:  Risk of Unplanned Readmission Score: 25 65         Current admission status: Inpatient   [facility]   [address] [facility] [address] [place] [address]  Phone: [phone] Fax: [phone]    [address]  [address] [address]  Phone: [phone] Fax: [phone]    [facility] [address] [place], [place] - [address] [facility] [address]  Phone: [phone] Fax: [address] [facility], [place] - [address] ,  [address] ,  [address] [facility] [address]  Phone: [phone] Fax: [phone]    [address] [place] [address] - [address]  [facility]  [address]  [facility] [place] [address]  Phone: [phone] Fax: [phone]    Primary Care Provider: Jak Quiroz DO    Primary Insurance: MEDICARE  Secondary Insurance: [facility]    DISCHARGE DETAILS:                   Additional Comments: Pt will need a ride home today  Pt signed PAULINA paper and copy put in bin to be scan  RN would like  at 1300 and will call PAULINA for

## 2023-02-22 NOTE — DISCHARGE INSTR - AVS FIRST PAGE
You can resume all of your medications at home that you were taking prior to hospitalization  Your next appointment with vascular is on March 29  You have a follow-up with neurology on April 17  I placed the numbers on your discharge paperwork if you want to call and schedule sooner  Please also call your family doctor within 1 week of discharge for a follow-up appointment  Please also follow-up with your cardiologist and schedule an appointment within 1 week of discharge    You are going home with home health rehab

## 2023-02-23 ENCOUNTER — TRANSITIONAL CARE MANAGEMENT (OUTPATIENT)
Dept: FAMILY MEDICINE CLINIC | Facility: CLINIC | Age: 63
End: 2023-02-23

## 2023-02-23 ENCOUNTER — TELEPHONE (OUTPATIENT)
Dept: NEUROLOGY | Facility: CLINIC | Age: 63
End: 2023-02-23

## 2023-02-23 DIAGNOSIS — Z71.89 COMPLEX CARE COORDINATION: Primary | ICD-10-CM

## 2023-02-23 DIAGNOSIS — G43.001 MIGRAINE WITHOUT AURA AND WITH STATUS MIGRAINOSUS, NOT INTRACTABLE: ICD-10-CM

## 2023-02-23 NOTE — TELEPHONE ENCOUNTER
ALL SCHEDULED: Patient accepted appointment for 4/5/23 at 130 pm with Mariah July in [facility]  HFU/SLA/FACIAL DROOP        NOTES: Cleo Carbon will need follow up in in 6 weeks with neuromuscular AP or attending

## 2023-02-24 ENCOUNTER — HOME CARE VISIT (OUTPATIENT)
Dept: HOME HEALTH SERVICES | Facility: HOME HEALTHCARE | Age: 63
End: 2023-02-24

## 2023-02-24 ENCOUNTER — TELEPHONE (OUTPATIENT)
Dept: CARDIOLOGY CLINIC | Facility: CLINIC | Age: 63
End: 2023-02-24

## 2023-02-24 ENCOUNTER — PATIENT OUTREACH (OUTPATIENT)
Dept: FAMILY MEDICINE CLINIC | Facility: CLINIC | Age: 63
End: 2023-02-24

## 2023-02-24 DIAGNOSIS — G43.001 MIGRAINE WITHOUT AURA AND WITH STATUS MIGRAINOSUS, NOT INTRACTABLE: ICD-10-CM

## 2023-02-24 RX ORDER — BUTALBITAL, ACETAMINOPHEN AND CAFFEINE 50; 325; 40 MG/1; MG/1; MG/1
1 TABLET ORAL EVERY 6 HOURS PRN
Qty: 90 TABLET | Refills: 0 | Status: SHIPPED | OUTPATIENT
Start: 2023-02-24 | End: 2023-02-24 | Stop reason: SDUPTHER

## 2023-02-24 RX ORDER — BUTALBITAL, ACETAMINOPHEN AND CAFFEINE 50; 325; 40 MG/1; MG/1; MG/1
1 TABLET ORAL EVERY 6 HOURS PRN
Qty: 90 TABLET | Refills: 0 | Status: SHIPPED | OUTPATIENT
Start: 2023-02-24

## 2023-02-27 ENCOUNTER — HOME CARE VISIT (OUTPATIENT)
Dept: HOME HEALTH SERVICES | Facility: HOME HEALTHCARE | Age: 63
End: 2023-02-27

## 2023-02-27 VITALS — HEART RATE: 60 BPM | SYSTOLIC BLOOD PRESSURE: 126 MMHG | DIASTOLIC BLOOD PRESSURE: 88 MMHG | OXYGEN SATURATION: 93 %

## 2023-02-27 VITALS
OXYGEN SATURATION: 92 % | DIASTOLIC BLOOD PRESSURE: 74 MMHG | RESPIRATION RATE: 18 BRPM | HEART RATE: 68 BPM | SYSTOLIC BLOOD PRESSURE: 136 MMHG

## 2023-02-28 ENCOUNTER — RA CDI HCC (OUTPATIENT)
Dept: OTHER | Facility: HOSPITAL | Age: 63
End: 2023-02-28

## 2023-02-28 ENCOUNTER — OFFICE VISIT (OUTPATIENT)
Dept: FAMILY MEDICINE CLINIC | Facility: CLINIC | Age: 63
End: 2023-02-28

## 2023-02-28 VITALS
SYSTOLIC BLOOD PRESSURE: 152 MMHG | DIASTOLIC BLOOD PRESSURE: 88 MMHG | BODY MASS INDEX: 33.32 KG/M2 | WEIGHT: 195.2 LBS | OXYGEN SATURATION: 95 % | TEMPERATURE: 97.5 F | HEART RATE: 67 BPM | HEIGHT: 64 IN

## 2023-02-28 DIAGNOSIS — M54.2 NECK PAIN: ICD-10-CM

## 2023-02-28 DIAGNOSIS — E78.2 MIXED HYPERLIPIDEMIA: ICD-10-CM

## 2023-02-28 DIAGNOSIS — R29.810 FACIAL DROOP: Primary | ICD-10-CM

## 2023-02-28 DIAGNOSIS — I10 ESSENTIAL HYPERTENSION: ICD-10-CM

## 2023-02-28 DIAGNOSIS — I65.22 STENOSIS OF LEFT CAROTID ARTERY: ICD-10-CM

## 2023-02-28 DIAGNOSIS — R20.0 LEFT FACIAL NUMBNESS: ICD-10-CM

## 2023-02-28 RX ORDER — TRAMADOL HYDROCHLORIDE 50 MG/1
100 TABLET ORAL EVERY 8 HOURS PRN
Qty: 60 TABLET | Refills: 0 | Status: SHIPPED | OUTPATIENT
Start: 2023-02-28 | End: 2023-03-10 | Stop reason: SDUPTHER

## 2023-02-28 RX ORDER — DILTIAZEM HYDROCHLORIDE 120 MG/1
CAPSULE, COATED, EXTENDED RELEASE ORAL
COMMUNITY
Start: 2023-02-22

## 2023-02-28 NOTE — PROGRESS NOTES
TCM Call     Date and time call was made  2/23/2023  9:11 AM    Hospital care reviewed  Records reviewed    Patient was hospitialized at  [facility] [place] - CLOS[facility]    Date of Admission  02/20/23    Date of discharge  02/22/23    Diagnosis  FACIAL DROOP    Disposition  Home    Were the patients medications reviewed and updated  Yes    Current Symptoms  None      TCM Call     Post hospital issues  None    Should patient be enrolled in anticoag monitoring? No    Scheduled for follow up? Yes    Patients specialists  Neurologist    Neurologist name  Follow up with [facility] (Neurology); Please follow up with Neurology  The  will call you to set up an appointment   If you do not hear from the  within 1 week,    Did you obtain your prescribed medications  Yes    Do you need help managing your prescriptions or medications  No    Is transportation to your appointment needed  No    I have advised the patient to call PCP with any new or worsening symptoms  [facility] CAR[facility], [address] members    Support System  Family    The type of support provided  Emotional; Financial; Physical    Do you have social support  Yes, as much as I need    Are you recieving any outpatient services  No    What type of services  NURSES    Are you recieving home care services  No    Types of home care services  Nurse visit    Are you using any community resources  No    Current waiver services  No    Have you fallen in the last 12 months  No    Interperter language line needed  No    Counseling  Patient

## 2023-02-28 NOTE — PROGRESS NOTES
Assessment/Plan: All hospital records reviewed at this time  Patient will follow-up with neurology as well as vascular surgery and PT OT speech  No CVA noted via scans  Discussion with patient regarding left facial droop/facial numbness and numbness around the incision left neck  Patient will follow with cardiology appropriately  Blood pressure slightly elevated  Other guidance given at this time  Refills given at this time  Given high-grade stenosis left internal carotid artery patient will need to follow with vascular for procedure in the near future  Patient will follow-up with cardiology regarding hyperlipidemic state  Follow-up in office per routine       Diagnoses and all orders for this visit:    Facial droop    Stenosis of left carotid artery    Left facial numbness    Essential hypertension    Mixed hyperlipidemia    Neck pain  -     traMADol (Ultram) 50 mg tablet; Take 2 tablets (100 mg total) by mouth every 8 (eight) hours as needed for moderate pain    Other orders  -     diltiazem (CARDIZEM CD) 120 mg 24 hr capsule            Subjective:        Patient ID: Veronica Arvizu is a 70 y o  female  Patient is here to follow-up from hospitalization from February 20 through the 22nd  Patient diagnosed with left facial droop and left facial numbness with paresthesias of left arm greater than right arm  Patient admitted for stroke protocol  Patient did have multiple studies done including laboratory studies and echo which was 60%  Patient did have CT of the head which was negative  CTA of the head and neck showed severe high-grade stenosis of the left internal carotid artery  MRI of the brain showed no acute findings  Patient to see vascular as well as PT OT and speech and neurology  Other studies reviewed  Other diagnoses include carotid artery disease as well as hypertension hyperlipidemia  Patient still with numbness around incision site left neck    Patient also with facial droop which persists but unchanged  No new neurologic deficits noted  No new chest pain or shortness of breath or change in urination or defecation fevers chills at this time  The following portions of the patient's history were reviewed and updated as appropriate: allergies, current medications, past family history, past medical history, past social history, past surgical history and problem list       Review of Systems   Constitutional: Negative  HENT: Negative  Eyes: Negative  Respiratory: Negative  Cardiovascular: Negative  Gastrointestinal: Negative  Endocrine: Negative  Genitourinary: Negative  Musculoskeletal: Negative  Skin: Negative  Allergic/Immunologic: Negative  Neurological: Positive for facial asymmetry, weakness and numbness  Hematological: Negative  Psychiatric/Behavioral: Negative  Objective:               /88 (BP Location: Right arm, Patient Position: Sitting, Cuff Size: Standard)   Pulse 67   Temp 97 5 °F (36 4 °C) (Temporal)   Ht 5' 4" (1 626 m)   Wt 88 5 kg (195 lb 3 2 oz)   LMP  (LMP Unknown)   SpO2 95%   BMI 33 51 kg/m²          Physical Exam  Vitals and nursing note reviewed  Constitutional:       General: She is not in acute distress  Appearance: Normal appearance  She is not ill-appearing, toxic-appearing or diaphoretic  HENT:      Head: Normocephalic and atraumatic  Right Ear: Tympanic membrane, ear canal and external ear normal  There is no impacted cerumen  Left Ear: Tympanic membrane, ear canal and external ear normal  There is no impacted cerumen  Nose: Nose normal  No congestion or rhinorrhea  Mouth/Throat:      Mouth: Mucous membranes are moist       Pharynx: No oropharyngeal exudate or posterior oropharyngeal erythema  Eyes:      General: No scleral icterus  Right eye: No discharge  Left eye: No discharge  Extraocular Movements: Extraocular movements intact  Conjunctiva/sclera: Conjunctivae normal       Pupils: Pupils are equal, round, and reactive to light  Neck:      Vascular: Carotid bruit present  Comments: Left carotid bruit  Cardiovascular:      Rate and Rhythm: Normal rate and regular rhythm  Pulses: Normal pulses  Heart sounds: Normal heart sounds  No murmur heard  No friction rub  No gallop  Pulmonary:      Effort: Pulmonary effort is normal  No respiratory distress  Breath sounds: Normal breath sounds  No stridor  No wheezing, rhonchi or rales  Chest:      Chest wall: No tenderness  Musculoskeletal:         General: Tenderness present  No swelling, deformity or signs of injury  Normal range of motion  Cervical back: Normal range of motion and neck supple  No rigidity  No muscular tenderness  Right lower leg: No edema  Left lower leg: No edema  Lymphadenopathy:      Cervical: No cervical adenopathy  Skin:     General: Skin is warm and dry  Capillary Refill: Capillary refill takes less than 2 seconds  Coloration: Skin is not jaundiced  Findings: No bruising, erythema or rash  Comments: Cicatrix left neck with surrounding numbness   Neurological:      Mental Status: She is alert and oriented to person, place, and time  Mental status is at baseline  Cranial Nerves: No cranial nerve deficit  Sensory: No sensory deficit  Motor: No weakness  Coordination: Coordination normal       Gait: Gait normal    Psychiatric:         Mood and Affect: Mood normal          Behavior: Behavior normal          Thought Content:  Thought content normal          Judgment: Judgment normal

## 2023-03-01 ENCOUNTER — HOME CARE VISIT (OUTPATIENT)
Dept: HOME HEALTH SERVICES | Facility: HOME HEALTHCARE | Age: 63
End: 2023-03-01

## 2023-03-01 ENCOUNTER — TELEPHONE (OUTPATIENT)
Dept: NEUROLOGY | Facility: CLINIC | Age: 63
End: 2023-03-01

## 2023-03-01 VITALS — SYSTOLIC BLOOD PRESSURE: 150 MMHG | DIASTOLIC BLOOD PRESSURE: 80 MMHG

## 2023-03-01 NOTE — TELEPHONE ENCOUNTER
Received vm from [facility] stating that patient has reported new insurance and needs PA for [facility]     Cb#: [phone]

## 2023-03-02 ENCOUNTER — HOME CARE VISIT (OUTPATIENT)
Dept: HOME HEALTH SERVICES | Facility: HOME HEALTHCARE | Age: 63
End: 2023-03-02

## 2023-03-02 ENCOUNTER — TELEPHONE (OUTPATIENT)
Dept: VASCULAR SURGERY | Facility: CLINIC | Age: 63
End: 2023-03-02

## 2023-03-02 VITALS
OXYGEN SATURATION: 94 % | HEART RATE: 80 BPM | DIASTOLIC BLOOD PRESSURE: 82 MMHG | TEMPERATURE: 97.3 F | SYSTOLIC BLOOD PRESSURE: 124 MMHG | RESPIRATION RATE: 10 BRPM

## 2023-03-02 NOTE — CASE COMMUNICATION
I did a re-evaluation on this patient on 2/27/23 an she is functioning independently in her home and not using any assitive devices  I do not feel any exercise is appropriate at this time due to her medical condition and waiting to have surgery on her carotid artery  I explained she should take it easy and just do what she needs to do until the medical condition is resolved  No further home P T  visits are planned  Any questions please  feel free to contact me  B[place] HILLS

## 2023-03-02 NOTE — CASE COMMUNICATION
For informational purposes only  No response required  OT evaluation completed on 3/1/23  Pt is able to complete her ADLs, laundry and light meal prep  No further OT visits indicated at this time  Pt in agreement

## 2023-03-02 NOTE — TELEPHONE ENCOUNTER
S/w pt and notified of same, pt states she took tramadol and feels better  Advised she does need to have this pain evaluated and she needs to reach out to pcp or neurologist   Pt states she will do so

## 2023-03-02 NOTE — TELEPHONE ENCOUNTER
Pt called ? If she can apply heat or ice to her L eye to help w/ pain  She is s/p surgery 1/31/23 "EXPLORATION OF LEFT CAROTID ARTERY; ABORTED ENDARTERECTOMY ARTERY CAROTID (Left: Neck)"  She was recently readmitted to the hospital and has f/u visit w/ Dr Lincoln Fell 3/29/23     ? When pain in her L eye started, pt states she has had since surgery 1/31/23 however it has increased over the last 5-6 days and is now constant,she describes it as "pressure" and rates it 5-6 out of 10   ? If she has ever mentioned pain to anyone previously, she is unsure, she cannot recall but does not think she has   ? If any visual disturbance, she notes her vision is "a little blurry, I think it's just tired", she states this has been present since surgery also  She denies any swelling in eye  She also notes she has headaches every day and has seen neurology and has f/u visit w/ them in April  She states "they recommended shots but I just could not go through w/ it"  ? If she has taken anything for pain, per pt she takes tramadol, she also took three 325mg tylenol tablets this am but it did not help w/ pain  She also notes she has pain in her face and chin since surgery but it is now minimal     Advised I would route concerns to our triage provider and we would get back to her w/ recommendations

## 2023-03-02 NOTE — TELEPHONE ENCOUNTER
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

## 2023-03-06 ENCOUNTER — HOME CARE VISIT (OUTPATIENT)
Dept: HOME HEALTH SERVICES | Facility: HOME HEALTHCARE | Age: 63
End: 2023-03-06

## 2023-03-06 NOTE — TELEPHONE ENCOUNTER
PA denied  Pt is eligible for medicare    Pt has alt benefit on file    rxbin [id number]  [facility] meddprime  Group [facility]  ID [id number]    PA initiated on CMM   (Key: [id number])  DCSBUV:[id number];VKGJBA:[id number]; Review Type:Prior Auth; Coverage Start Date:02/04/2023; Coverage End Date:03/05/2024;     [facility], spoke to [facility] and made aware of the approval  She verbalized understanding  She got a paid claim  She will call pt to schedule delivery

## 2023-03-07 ENCOUNTER — OFFICE VISIT (OUTPATIENT)
Dept: FAMILY MEDICINE CLINIC | Facility: CLINIC | Age: 63
End: 2023-03-07

## 2023-03-07 VITALS
OXYGEN SATURATION: 94 % | TEMPERATURE: 98.2 F | SYSTOLIC BLOOD PRESSURE: 130 MMHG | DIASTOLIC BLOOD PRESSURE: 86 MMHG | WEIGHT: 193.4 LBS | HEIGHT: 64 IN | BODY MASS INDEX: 33.02 KG/M2 | HEART RATE: 57 BPM

## 2023-03-07 VITALS
HEART RATE: 55 BPM | TEMPERATURE: 97.8 F | DIASTOLIC BLOOD PRESSURE: 90 MMHG | SYSTOLIC BLOOD PRESSURE: 148 MMHG | OXYGEN SATURATION: 97 % | RESPIRATION RATE: 18 BRPM

## 2023-03-07 DIAGNOSIS — F41.9 ANXIETY: ICD-10-CM

## 2023-03-07 DIAGNOSIS — R47.9 SPEECH DISORDER: ICD-10-CM

## 2023-03-07 DIAGNOSIS — R93.7 ABNORMAL BONE DENSITY SCREENING: ICD-10-CM

## 2023-03-07 DIAGNOSIS — I10 ESSENTIAL HYPERTENSION: Primary | ICD-10-CM

## 2023-03-07 DIAGNOSIS — G43.009 MIGRAINE WITHOUT AURA AND WITHOUT STATUS MIGRAINOSUS, NOT INTRACTABLE: ICD-10-CM

## 2023-03-07 DIAGNOSIS — I65.22 STENOSIS OF LEFT CAROTID ARTERY: ICD-10-CM

## 2023-03-07 RX ORDER — LORAZEPAM 1 MG/1
1 TABLET ORAL EVERY 6 HOURS PRN
Qty: 90 TABLET | Refills: 0 | Status: SHIPPED | OUTPATIENT
Start: 2023-03-07

## 2023-03-07 NOTE — PROGRESS NOTES
Assessment/Plan: A1c 5 0 BMP CBC normal and reviewed  Total cholesterol 249  Patient will follow with cardiology regarding elevated lipids  Patient will have Ativan refilled for anxiety  Patient will be referred to speech therapy  Patient will follow-up with ophthalmologist appropriately         Diagnoses and all orders for this visit:    Essential hypertension    Migraine without aura and without status migrainosus, not intractable    Stenosis of left carotid artery    Anxiety  -     LORazepam (ATIVAN) 1 mg tablet; Take 1 tablet (1 mg total) by mouth every 6 (six) hours as needed for anxiety    Abnormal bone density screening    Speech disorder  -     Ambulatory Referral to Speech Therapy; Future            Subjective:        Patient ID: Kasey Mcclure is a 70 y o  female  Patient is here to follow-up on anxiety  Patient needs Ativan refilled  Patient with ongoing pain left face around the eye and face  Patient is seeing neurology beginning of April  The following portions of the patient's history were reviewed and updated as appropriate: allergies, current medications, past family history, past medical history, past social history, past surgical history and problem list       Review of Systems   Constitutional: Negative  HENT: Negative  Eyes: Negative  Respiratory: Negative  Cardiovascular: Negative  Gastrointestinal: Negative  Endocrine: Negative  Genitourinary: Negative  Musculoskeletal: Negative  Skin: Negative  Allergic/Immunologic: Negative  Neurological: Positive for weakness, numbness and headaches  Hematological: Negative  Psychiatric/Behavioral: The patient is nervous/anxious              Objective:               /86 (BP Location: Right arm, Patient Position: Sitting, Cuff Size: Standard)   Pulse 57   Temp 98 2 °F (36 8 °C) (Tympanic)   Ht 5' 4" (1 626 m)   Wt 87 7 kg (193 lb 6 4 oz)   LMP  (LMP Unknown)   SpO2 94%   BMI 33 20 kg/m² Physical Exam  Vitals and nursing note reviewed  Constitutional:       Appearance: Normal appearance  Cardiovascular:      Rate and Rhythm: Normal rate and regular rhythm  Pulses: Normal pulses  Heart sounds: Normal heart sounds  Pulmonary:      Effort: Pulmonary effort is normal       Breath sounds: Normal breath sounds  Neurological:      Mental Status: She is alert  Psychiatric:         Mood and Affect: Mood normal          Behavior: Behavior normal          Thought Content:  Thought content normal          Judgment: Judgment normal

## 2023-03-08 DIAGNOSIS — I10 BENIGN ESSENTIAL HYPERTENSION: ICD-10-CM

## 2023-03-08 DIAGNOSIS — I10 ESSENTIAL HYPERTENSION: ICD-10-CM

## 2023-03-08 RX ORDER — FUROSEMIDE 40 MG/1
TABLET ORAL
Qty: 90 TABLET | Refills: 0 | Status: SHIPPED | OUTPATIENT
Start: 2023-03-08

## 2023-03-09 ENCOUNTER — HOME CARE VISIT (OUTPATIENT)
Dept: HOME HEALTH SERVICES | Facility: HOME HEALTHCARE | Age: 63
End: 2023-03-09

## 2023-03-09 VITALS
SYSTOLIC BLOOD PRESSURE: 130 MMHG | DIASTOLIC BLOOD PRESSURE: 76 MMHG | OXYGEN SATURATION: 98 % | RESPIRATION RATE: 22 BRPM | TEMPERATURE: 98 F | HEART RATE: 78 BPM

## 2023-03-10 ENCOUNTER — TELEPHONE (OUTPATIENT)
Dept: UROLOGY | Facility: AMBULATORY SURGERY CENTER | Age: 63
End: 2023-03-10

## 2023-03-10 DIAGNOSIS — M54.2 NECK PAIN: ICD-10-CM

## 2023-03-10 RX ORDER — TRAMADOL HYDROCHLORIDE 50 MG/1
100 TABLET ORAL EVERY 8 HOURS PRN
Qty: 60 TABLET | Refills: 0 | Status: SHIPPED | OUTPATIENT
Start: 2023-03-10

## 2023-03-10 NOTE — TELEPHONE ENCOUNTER
Pt under care of Dr Lilia José    Pt called and requesting a VV due to pt just having surgery on her carotid artery    Pt call back-[phone]

## 2023-03-14 ENCOUNTER — PATIENT MESSAGE (OUTPATIENT)
Dept: FAMILY MEDICINE CLINIC | Facility: CLINIC | Age: 63
End: 2023-03-14

## 2023-03-14 ENCOUNTER — HOME CARE VISIT (OUTPATIENT)
Dept: HOME HEALTH SERVICES | Facility: HOME HEALTHCARE | Age: 63
End: 2023-03-14

## 2023-03-14 VITALS
HEART RATE: 62 BPM | TEMPERATURE: 97.9 F | SYSTOLIC BLOOD PRESSURE: 130 MMHG | OXYGEN SATURATION: 95 % | RESPIRATION RATE: 18 BRPM | DIASTOLIC BLOOD PRESSURE: 74 MMHG

## 2023-03-14 DIAGNOSIS — R20.0 LEFT FACIAL NUMBNESS: ICD-10-CM

## 2023-03-14 DIAGNOSIS — M79.602 LEFT ARM PAIN: ICD-10-CM

## 2023-03-14 DIAGNOSIS — I63.9 ACUTE CVA (CEREBROVASCULAR ACCIDENT) (HCC): Primary | ICD-10-CM

## 2023-03-14 DIAGNOSIS — R47.9 SPEECH DISORDER: Primary | ICD-10-CM

## 2023-03-14 DIAGNOSIS — R93.7 ABNORMAL BONE DENSITY SCREENING: ICD-10-CM

## 2023-03-14 NOTE — CASE MANAGEMENT
Case Management Discharge Planning Note    Patient name Adrienne Vitale  Location [address] [place] [address] [address] 200-* MRN [id number]  : 1960 Date 3/14/2023       Current Admission Date: 2023  Current Admission Diagnosis:Facial droop   Patient Active Problem List    Diagnosis Date Noted   • Speech disorder 2023   • Left facial numbness 2023   • Facial droop 2023   • Numbness 2023   • Morbid obesity ([address] ) 02/10/2023   • Left lower quadrant abdominal pain 02/10/2023   • Partial facial nerve palsy 2023   • Lower abdominal pain 2022   • Hepatic steatosis 2022   • Premature atrial contractions 2022   • Asymptomatic stenosis of left carotid artery 10/05/2022   • Stenosis of left carotid artery 2022   • Carotid artery stenosis 2022   • Stroke-like symptoms 2022   • Localized swelling of left lower extremity 2022   • Acute CVA (cerebrovascular accident) ([address] ) 2022   • Recurrent UTI 2022   • Pain of upper abdomen 2022   • Left upper quadrant abdominal pain 2022   • S/P appendectomy 2022   • Appendix disease 2022   • Urinary retention 2022   • Peripheral vascular disease ([address] ) 2022   • Hematoma 11/15/2021   • Continuous opioid dependence ([address] ) 11/15/2021   • 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   • Hypoxia 2021   • Chronic bronchitis, unspecified chronic bronchitis type ([address] ) 2021   • Left arm pain 2021   • COPD (chronic obstructive pulmonary disease) ([address] ) 2021   • Diverticulitis 2021   • Left upper quadrant pain 2021   • Moderate persistent asthma without complication    • Vitamin B12 deficiency 2021   • Unspecified diastolic (congestive) heart failure ([address] ) 2021   • Shortness of breath 2021   • Hypertensive heart disease with congestive heart failure ([facility] ) 2020   • CAMILLE (obstructive sleep apnea)    • Acute pyelonephritis 11/04/2020   • Lumbar radiculopathy 10/01/2020   • Chronic fatigue 10/01/2020   • Wheezing 09/21/2020   • Injury of toe on right foot 09/01/2020   • Patellar tendinitis of left knee 07/31/2020   • Hair loss 07/09/2020   • [place] (dyspnea on exertion) 06/11/2020   • Vaginal candidiasis 04/09/2020   • Paroxysmal atrial fibrillation (HCC) 03/19/2020   • Low TSH level 02/06/2020   • Other chest pain 01/27/2020   • Vasomotor rhinitis 01/25/2020   • Allergic conjunctivitis of both eyes 01/22/2020   • Intrinsic atopic dermatitis 01/22/2020   • Angio-edema 01/22/2020   • Gastroesophageal reflux disease without esophagitis 08/12/2019   • Tinea corporis 07/15/2019   • Acute gastric ulcer without hemorrhage or perforation 06/12/2019   • Palpitations and chest pain   04/02/2019   • Intertrigo 04/02/2019   • Allergic reaction 12/03/2018   • Insomnia 11/16/2018   • Snoring 11/16/2018   • Cervicalgia 11/16/2018   • Headache 11/16/2018   • AMD (age-related macular degeneration), bilateral 11/16/2018   • Anxiety 10/19/2018   • Angina pectoris ([address] ) 11/15/2017   • Female pelvic pain 09/25/2017   • Arthritis 08/01/2017   • Venous insufficiency 08/01/2017   • Tick bite 07/14/2017   • Acute upper respiratory infection 02/24/2017   • Attention disturbance 01/26/2017   • Familial hyperlipidemia 12/02/2016   • Allergic rhinitis 10/14/2016   • Anxiety and depression 10/14/2016   • Asthma due to seasonal allergies 10/14/2016   • Benign essential hypertension 10/14/2016   • Interstitial cystitis 10/14/2016   • Chronic low back pain 10/14/2016   • Elevated antinuclear antibody (SON) level 10/14/2016   • Elevated blood sugar 10/14/2016   • Hyperlipidemia 10/14/2016   • Migraines 10/14/2016   • Essential hypertension 08/13/2015   • Lipid disorder 08/13/2015   • Tobacco abuse 08/13/2015   • Obesity (BMI 30-39 9) 03/24/2011      LOS (days): 2  Geometric Mean LOS (GMLOS) (days): 2 80  Days to GMLOS:0 9     OBJECTIVE:  Risk of Unplanned Readmission Score: 25 65         Current admission status: Inpatient   Preferred Pharmacy:   [address], [address] [place] [address]  Phone: [phone] Fax: [phone]    [address], [address] [facility] [address]  [address] [address]  Phone: [phone] Fax: [phone]    [facility] [place], [place] - [address] [place] [address]  Phone: [phone] Fax: [phone] [facility], [place] - [address] ,  [address] ,  [address]  Phone: [phone] Fax: [facility] [place], [place] - [address] [place] [address]  [address] [place] [address]  Phone: [phone] Fax: [phone]    Primary Care Provider: Garry Lr DO    Primary Insurance: MEDICARE  Secondary Insurance: Neris Godoy    DISCHARGE DETAILS:       Additional Comments: Rec a message today that pt did not rec her walker  Pt rec BSC, but not the walker  Sent message to [facility] to deliver the walker to the home and f/u with pt  Also left a message with pt that walker should be deliver today or tomorrow, but if she does not get walker to call [facility] [address] [facility] at [phone]

## 2023-03-14 NOTE — TELEPHONE ENCOUNTER
Patient received a letter from her insurance company with need to have the hydroxyzine authorized     hydrOXYzine HCL (ATARAX) 25 mg tablet [[id number]]     Order Details  Dose: 50 mg Route: Oral Frequency: Daily at bedtime   Dispense Quantity: 90 tablet Refills: 3

## 2023-03-16 ENCOUNTER — HOME CARE VISIT (OUTPATIENT)
Dept: HOME HEALTH SERVICES | Facility: HOME HEALTHCARE | Age: 63
End: 2023-03-16

## 2023-03-16 LAB
DME PARACHUTE DELIVERY DATE ACTUAL: NORMAL
DME PARACHUTE DELIVERY DATE REQUESTED: NORMAL
DME PARACHUTE ITEM DESCRIPTION: NORMAL
DME PARACHUTE ORDER STATUS: NORMAL
DME PARACHUTE SUPPLIER NAME: NORMAL
DME PARACHUTE SUPPLIER PHONE: NORMAL

## 2023-03-17 ENCOUNTER — OFFICE VISIT (OUTPATIENT)
Dept: VASCULAR SURGERY | Facility: CLINIC | Age: 63
End: 2023-03-17

## 2023-03-17 ENCOUNTER — EVALUATION (OUTPATIENT)
Dept: PHYSICAL THERAPY | Facility: REHABILITATION | Age: 63
End: 2023-03-17

## 2023-03-17 VITALS
BODY MASS INDEX: 32.96 KG/M2 | WEIGHT: 192 LBS | OXYGEN SATURATION: 95 % | DIASTOLIC BLOOD PRESSURE: 110 MMHG | SYSTOLIC BLOOD PRESSURE: 150 MMHG | HEART RATE: 59 BPM

## 2023-03-17 DIAGNOSIS — I65.22 STENOSIS OF LEFT CAROTID ARTERY: ICD-10-CM

## 2023-03-17 DIAGNOSIS — S04.52XD: ICD-10-CM

## 2023-03-17 DIAGNOSIS — M62.89 PELVIC FLOOR DYSFUNCTION: Primary | ICD-10-CM

## 2023-03-17 DIAGNOSIS — R10.9 CHRONIC ABDOMINAL PAIN: ICD-10-CM

## 2023-03-17 DIAGNOSIS — K59.09 OTHER CONSTIPATION: ICD-10-CM

## 2023-03-17 DIAGNOSIS — R39.89 BLADDER PAIN: ICD-10-CM

## 2023-03-17 DIAGNOSIS — I65.22 ASYMPTOMATIC CAROTID ARTERY STENOSIS WITHOUT INFARCTION, LEFT: Primary | ICD-10-CM

## 2023-03-17 DIAGNOSIS — G89.29 CHRONIC ABDOMINAL PAIN: ICD-10-CM

## 2023-03-17 PROBLEM — S04.52XA INJURY OF LEFT FACIAL NERVE: Status: ACTIVE | Noted: 2023-03-17

## 2023-03-17 NOTE — ASSESSMENT & PLAN NOTE
Shelby Arenas returns to office for routine scheduled office visit  She had attempted Left CEA which ultimately was aborted as lesion extending too far distally and unable to attain adequate distal exposure  Post op course complicated by marginal mandibular nerve injury with facial droop which continues to persist     We discussed due to asymptomatic nature of high frade left carotid stenosis to continue medical management at this time at min to allow time for continued healing of nerve compression and or traction injury sustained during carotid exposure  We reviewed signs and symptoms of stroke  We briefly discussed that while challenging she may be considered for TCAR in the future  Will obtain a repeat surveillance carotid duplex in 3 months  Patient verbalized understanding and is in agreement with above recommendation

## 2023-03-17 NOTE — PROGRESS NOTES
PT Evaluation     Today's date: 3/17/2023  Patient name: Delia Pacheco  : 1960  MRN: [id number]  Referring provider: Chris Hedrick, *  Dx:   Encounter Diagnosis     ICD-10-CM    1  Pelvic floor dysfunction  M62 89       2  Other constipation  K59 09 Ambulatory Referral to Physical Therapy      3  Chronic abdominal pain  R10 9 Ambulatory Referral to Physical Therapy    G89 29       4  Bladder pain  R39 89           Start Time: 1040  Stop Time: 1140  Total time in clinic (min): 60 minutes    Assessment  Assessment details: Patient is a 70 y o  female who presents to physical therapy with diagnosis of constipation and chronic abdominal pain  Internal assessment will be deferred and performed prn  Functional impairments include pelvic pain, constipation, urinary urgency, urinary frequency, urinary leakage  Physical findings will be assessed NV  Patient would benefit from formal physical therapy to address impairments as detailed, decrease pain, and restore maximal level of function for all home and mobility tasks  Thank you for this referral     Understanding of Dx/Px/POC: good   Prognosis: good    Goals  Short term goals:  Pt will report instance of urinary incontinence less than 3 days/week in 6 weeks  Pt will reduce pad usage to social outings or less per day in 6 weeks  Pt will perform Kegel consistently prior to cough/sneeze in 6 weeks  Pt will be able to delay urge at least 5 minutes in 6 weeks  Pt will demonstrate good understanding of urge delay techniques in 6 weeks  Pt will be independent in 1 relaxation based strategy   Pt will utilize proper bowel mechanics and posture    Long term goals:  Pt will be compliant with HEP by discharge  Pt will be able to delay urge for greater than 20 minutes by discharge  Pt will present with pelvic pain rated 3/10 at worst         Plan  Plan details: Cont per POC  Review bladder diary and bowel mechanics  Consider biofeedback for PFM awareness    Patient would benefit from: skilled physical therapy  Planned modality interventions: cryotherapy, biofeedback and thermotherapy: hydrocollator packs  Planned therapy interventions: joint mobilization, manual therapy, neuromuscular re-education, patient education, strengthening, stretching, therapeutic activities, therapeutic exercise, activity modification, abdominal trunk stabilization, coordination, flexibility, functional ROM exercises and home exercise program  Frequency: 1x week  Duration in weeks: 12  Treatment plan discussed with: patient        PT Pelvic Floor Subjective:   History of Present Illness:       Prior Pregnancy comment:  Infertility for 14 years Pt has IC and she gets really bad abdominal pain like areally bad UTI  This has been present for 2 years  She had bladder instillations by Dr Conner Romano 9-13 years ago  But 2 years ago it started worsening  She could not get in to see Dr Black again  She had a catheter placed for instillations and reports it was done without lubrication or and she screamed  She stopped going to that place and she has tried different places over the past 2 years and reports no one knows what is going on  She was having frequent UTI's  She saw Dr Heidi Erickson and was told she is resistant to antibiotics  She was told not to have any catheters placed in her urethra now  She was prescribed hydroxizine to take at bed time and pyridium which help  She has not used antibiotics since that appointment in November  Pt has not had a UTI since before her appointment with Dr Heidi Erickson in November  Pt would like her symptoms to be under control and gone completely  She still gets bladder pain even tough she does not have a UTI  She thinks it could be anxiety because she is going in for surgery soon  She has to pee really badly and she will go and it feels good but then after her pain comes back  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   Pt has difficulty with constipation   She takes Colace and miralax but does not always drink enough  Social Support:     L[facility] in:  [facility] with:  Alone    Work status: unemployed    Life stress severity: moderate    History of Depression: yes (20 years ago)  Diet and Exercise:    Diet:balanced nutrition    Exercise type: no activity  OB/ gyn History    Gestational History:     Prior Pregnancy: No      Menstrual History:      Menopausal: menopause  Bladder Function:     Voiding Difficulties positive for: urgency, frequent urination, hesitancy, straining and incomplete emptying      Voiding Difficulties comments:     Voiding frequency: every 1-2 hours and every 3-4 hours    Urinary leakage: urine leakage (little dribbles, she reports she does not feel it but will notice it on her pad )    Urinary leakage aggravated by: post-void dribble    Nocturia (episodes per night): 1    Painful urination: Yes (when I have to force it out)      Intake (ounces): Intake (ounces) comment: Water: 1 5 liters   Cranberry juice: 1-2 cups   Incontinence Management:     Pads/Diaper Use:  Day and 24 hours    Pads/Diapers Additional Comments: 1 pad a day, does not change if she notices leakage or change at night   Bowel Function:     Voiding DIfficulties: painful defecating and constipation      Bowel Function comments:  Pain and straining only when constipation present     Bowel frequency: daily    [place] Stool Scale: type 1, type 2, type 4 and type 3    Stool softener use: stool softeners    Enema use: no enema    Uses "squatty potty": no Squatty Potty  Sexual Function:     Sexually Active:  Not sexually active  Pain:     At best pain ratin    At worst pain ratin    Quality:  Cramping and radiating    Aggravating factors:  Unknown    Duration of symptoms:  Less than 1 hour    Relieving factors:  Emptying bladder  Treatments:     Current treatment: physical therapy    Patient Goals:      Other patient goals:  No more pain       Objective    Will assess NV Precautions: a-fib, anxiety, HTN  asthma  Impairments/functional limitations: urinary urgency, frequency, leakage, constipation, pelvic pain   Interstitial cystitits  Daily Treatment Diary    Manuals 3/17         External assess prn         Internal assess prn         Objective assessment nv                   Neuro Re-Ed          TAC          PF          TAC+PF          TAC+PF with march          TAC+PF with alt knee fall out          TAC+PF with ball squeeze          TAC+PF with SLR          TAC+PF with s/l ABD          TAC+PF with bridge                              360 deg breathing          Ther Ex          Supine piri stretch          LTR          Seated piri stretch          Supine butterfly stretch                                                  Ther Activity          Bladder Diary  Review, issued          Urge delay:QF          Urge delay: HR          Freeze, breathe, squeeze           The knack          Decreasing pad usage review         Bowel mechanics/posture review         Healthy bladder habits  Review, sipping regularly, inc water intake                              Gait Training                              Modalities

## 2023-03-17 NOTE — PROGRESS NOTES
Assessment/Plan:    Injury of left facial nerve  Kaylene Maurice returns to office for routine scheduled office visit  She had attempted Left CEA which ultimately was aborted as lesion extending too far distally and unable to attain adequate distal exposure  Post op course complicated by marginal mandibular nerve injury with facial droop which continues to persist     We discussed due to asymptomatic nature of high frade left carotid stenosis to continue medical management at this time at min to allow time for continued healing of nerve compression and or traction injury sustained during carotid exposure  We reviewed signs and symptoms of stroke  We briefly discussed that while challenging she may be considered for TCAR in the future  Will obtain a repeat surveillance carotid duplex in 3 months  Patient verbalized understanding and is in agreement with above recommendation  Diagnoses and all orders for this visit:    Asymptomatic carotid artery stenosis without infarction, left  -     VAS carotid complete study; Future    Stenosis of left carotid artery    Injury of left facial nerve, subsequent encounter          Subjective:      Patient ID: Adrienne Vitale is a 70 y o  female  Patient is s/p aborted L CEA done 1/31/23 by Dr Shane Alvarez  Pt c/o swelling and tingling at incision site as well as pain down L side jaw  Pt sates she has headaches daily and denies all other TIA / stroke symptoms  Pt is taking Eliquis and ASA 81 mg  Kaylene Maurice returns to office for routine office visit  She continues with facial asymmetry secondary to post op (aborted L CEA) marginal mandibular nerve injury  She reports trouble chewing due to lip droop  She denies any new focal neuro deficits  She did end up in hospital back in feb as she was concerned with ongoing symptoms  MRI brain unremarkable for acute infarcts  CTA head and neck with known high grade left carotid stenosis        The following portions of the patient's history were reviewed and updated as appropriate: allergies, current medications, past family history, past medical history, past social history, past surgical history and problem list     Review of Systems   Constitutional: Negative for chills and fever  Eyes: Negative for visual disturbance  Neurological: Positive for headaches  Negative for dizziness, speech difficulty, weakness and light-headedness  Objective:      BP (!) 150/110 (BP Location: Left arm, Patient Position: Sitting, Cuff Size: Standard)   Pulse 59   Wt 87 1 kg (192 lb)   LMP  (LMP Unknown)   SpO2 95%   BMI 32 96 kg/m²          Physical Exam  Constitutional:       General: She is not in acute distress  Appearance: She is well-developed  HENT:      Head: Normocephalic and atraumatic  Eyes:      General: No scleral icterus  Conjunctiva/sclera: Conjunctivae normal    Neck:      Trachea: No tracheal deviation  Cardiovascular:      Rate and Rhythm: Normal rate and regular rhythm  Heart sounds: Normal heart sounds  Pulmonary:      Effort: Pulmonary effort is normal       Breath sounds: Normal breath sounds  Abdominal:      General: There is no distension  Palpations: Abdomen is soft  There is no mass (no appreciable aortic pulsation/aneurysm)  Tenderness: There is no abdominal tenderness  There is no guarding or rebound  Musculoskeletal:         General: Normal range of motion  Cervical back: Normal range of motion and neck supple  Skin:     General: Skin is warm and dry  Neurological:      Mental Status: She is alert and oriented to person, place, and time  Cranial Nerves: Cranial nerve deficit present  Psychiatric:         Mood and Affect: Mood normal          Behavior: Behavior normal          Thought Content:  Thought content normal          Judgment: Judgment normal

## 2023-03-19 VITALS
TEMPERATURE: 97.9 F | RESPIRATION RATE: 18 BRPM | SYSTOLIC BLOOD PRESSURE: 160 MMHG | DIASTOLIC BLOOD PRESSURE: 88 MMHG | OXYGEN SATURATION: 94 % | HEART RATE: 60 BPM

## 2023-03-20 ENCOUNTER — EVALUATION (OUTPATIENT)
Dept: SPEECH THERAPY | Facility: REHABILITATION | Age: 63
End: 2023-03-20

## 2023-03-20 ENCOUNTER — OFFICE VISIT (OUTPATIENT)
Dept: CARDIOLOGY CLINIC | Facility: CLINIC | Age: 63
End: 2023-03-20

## 2023-03-20 ENCOUNTER — NURSE TRIAGE (OUTPATIENT)
Dept: OTHER | Facility: OTHER | Age: 63
End: 2023-03-20

## 2023-03-20 VITALS
DIASTOLIC BLOOD PRESSURE: 70 MMHG | WEIGHT: 191 LBS | BODY MASS INDEX: 32.79 KG/M2 | HEART RATE: 111 BPM | SYSTOLIC BLOOD PRESSURE: 117 MMHG

## 2023-03-20 DIAGNOSIS — R22.9 SUBCUTANEOUS NODULE: ICD-10-CM

## 2023-03-20 DIAGNOSIS — S04.52XS INJURY OF LEFT FACIAL NERVE, SEQUELA: ICD-10-CM

## 2023-03-20 DIAGNOSIS — R00.2 PALPITATIONS: ICD-10-CM

## 2023-03-20 DIAGNOSIS — R47.9 SPEECH DISORDER: ICD-10-CM

## 2023-03-20 DIAGNOSIS — R60.9 EDEMA, UNSPECIFIED TYPE: ICD-10-CM

## 2023-03-20 DIAGNOSIS — M54.2 NECK PAIN: ICD-10-CM

## 2023-03-20 DIAGNOSIS — I10 BENIGN ESSENTIAL HTN: ICD-10-CM

## 2023-03-20 DIAGNOSIS — R47.1 DYSARTHRIA: ICD-10-CM

## 2023-03-20 DIAGNOSIS — R49.8 OTHER VOICE AND RESONANCE DISORDERS: ICD-10-CM

## 2023-03-20 DIAGNOSIS — R13.12 DYSPHAGIA, OROPHARYNGEAL PHASE: Primary | ICD-10-CM

## 2023-03-20 DIAGNOSIS — I48.0 PAROXYSMAL ATRIAL FIBRILLATION (HCC): Primary | ICD-10-CM

## 2023-03-20 RX ORDER — DILTIAZEM HYDROCHLORIDE 180 MG/1
180 CAPSULE, EXTENDED RELEASE ORAL DAILY
Qty: 90 CAPSULE | Refills: 3 | Status: SHIPPED | OUTPATIENT
Start: 2023-03-20

## 2023-03-20 RX ORDER — ONDANSETRON 4 MG/1
4 TABLET, ORALLY DISINTEGRATING ORAL EVERY 6 HOURS PRN
Qty: 60 TABLET | Refills: 0 | Status: SHIPPED | OUTPATIENT
Start: 2023-03-20

## 2023-03-20 RX ORDER — EZETIMIBE 10 MG/1
10 TABLET ORAL DAILY
Qty: 90 TABLET | Refills: 3 | Status: SHIPPED | OUTPATIENT
Start: 2023-03-20

## 2023-03-20 NOTE — TELEPHONE ENCOUNTER
Reason for Disposition  • Nausea is a chronic symptom (recurrent or ongoing AND present > 4 weeks)    Answer Assessment - Initial Assessment Questions  1  NAUSEA SEVERITY: "How bad is the nausea?" (e g , mild, moderate, severe; dehydration, weight loss)    - MILD: loss of appetite without change in eating habits    - MODERATE: decreased oral intake without significant weight loss, dehydration, or malnutrition    - SEVERE: inadequate caloric or fluid intake, significant weight loss, symptoms of dehydration      mild  2  ONSET: "When did the nausea begin?"      Ongoing x several weeks  3  VOMITING: "Any vomiting?" If Yes, ask: "How many times today?"      denies  4  RECURRENT SYMPTOM: "Have you had nausea before?" If Yes, ask: "When was the last time?" "What happened that time?"      yes  5   CAUSE: "What do you think is causing the nausea?"      unsure    Protocols used: NAUSEA-ADULT-

## 2023-03-20 NOTE — PROGRESS NOTES
Cardiology             Marlene Monae  1960  [id number]                 Assessment/Plan:     Paroxysmal atrial fibrillation, diagnosed on Holter monitor 03/2020  Hypertension  Multiple drug intolerances and possible allergies  Probable heterozygous familial hypercholesterolemia, on Repatha  Obesity   Sleep apnea, compliant with CPAP therapy   Lower extremity edema, on furosemide, resolved             Atrial fibrillation noted on today's ECG with RVR at 111 bpm   Interestingly she is asymptomatic at this time  We will increase her diltiazem  She is currently taking it as needed for palpitations  Will increase dose to 180 mg daily and have her take it once daily  She does not want to meet with electrophysiology again as she does not want an ablation procedure  At this time she would like to try only diltiazem for rate and hopefully rhythm control  At this time she does not want to try a class III antiarrhythmic drug  She was unable to tolerate flecainide due to increased reported palpitations  Continue metoprolol  Blood pressure controlled today, which historically has been difficult to control as she has reported intolerances to multiple medications    HCTZ has caused "throat closing," atorvastatin has caused joint pains, valsartan has caused reported angioedema, hydralazine has caused "kidney pain," nifedipine caused headache and nausea, verapamil cause increase in palpitations                    Follow-up with me in 1 month              Interval History:      This is a 39-year-old female diagnosed with paroxysmal atrial fibrillation on Holter monitoring 3/020   That time echocardiogram had revealed mild-to-moderate aortic regurgitation with normal left ventricular systolic function   Nuclear stress test June 2020 was unremarkable with normal perfusion      She has hypertension, and has multiple medications listed as allergies including beta-blockers, although she has been taking metoprolol for quite some time  Jhoanaola Harada on her allergy list HCTZ states “throat closing," she states that actually cause some ankle swelling along with amlodipine   Atorvastatin caused joint pain, although it is listed in her allergies also as “throat closing  ”  She seems to have had a true allergic reaction to hydralazine and valsartan   She states valsartan caused angioedema, and she does not remember what happened with hydralazine, although states she thinks it may have been ankle swelling      She was last seen by Dr Rosey Medeiros 01/28/2021 at which time she was maintained on metoprolol succinate and spironolactone for hypertension   At 1 point she was asked to take diltiazem as needed for palpitations  Val Paez has been maintained on Eliquis anticoagulation        She underwent a Zio monitor 03/2021 at [facility](should be scanned into media section, personally reviewed rhythm strips) revealing normal sinus rhythm with 8 runs of SVT, longest lasting 17 beats   There were 18 patient triggers, most of which correlated with PACs, short atrial runs, and PVCs  Antonio Serrano was no evidence of atrial fibrillation      Nuclear stress test 06/2020 was unremarkable     In August 2021 she was hospitalized at [facility] for epigastric and left upper quadrant pain, admitted for superior mesenteric artery thrombosis   She received heparin drip, and underwent SMA stenting with IR on 08/19/2021   She was initiated on Plavix, and continued on Eliquis      During her prior visit 10/15/2021 she was initiated on diltiazem which she later stopped due to headache and nausea   Subsequent nifedipine was tried which caused headache and nausea as well which she stopped on her own   Verapamil caused increase in palpitations      She went to [facility] ER due to urinary retention and was reported to be in atrial fibrillation  ZIO monitor 10/2022 revealed episodes of paroxysmal atrial fibrillation which correlated with her triggered symptoms  Echocardiogram 12/16/2022 demonstrated normal left ejection fraction of 55% with mild mitral and aortic regurgitation  Nuclear stress test 1/17/2023 demonstrated no evidence of myocardial ischemia  She was seen by electrophysiology and was initiated on flecainide which she did not tolerate well due to side effects  She refused ablation, therefore was placed on diltiazem  On 1/31/2023 she underwent exploration of left carotid artery for CEA, and this was aborted, as the left carotid bifurcation was high and deep in the neck with the hypoglossal nerve overriding the bifurcation  She went back to the hospital 2/2023 with a left facial droop, 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, and she was discharged  She presents today for follow-up  She denies any ongoing palpitations  She denies chest pain, shortness of breath, lower extremity edema        Vitals:  Vitals:    03/20/23 1350   BP: 117/70   BP Location: Right arm   Patient Position: Sitting   Cuff Size: Large   Pulse: (!) 111   Weight: 86 6 kg (191 lb)         Past Medical History:   Diagnosis Date   • [facility] 03/2020   • Allergic    • Anxiety    • Arthritis    • Asthma    • Back pain     and muscle pain   • Bruises easily    • Chronic pain disorder     Interstitial pain   • Colon polyp    • Dental bridge present    • Depression    • Eczema    • GERD (gastroesophageal reflux disease)    • Heart murmur    • History of blood clots     per pt "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"   • History of pneumonia    • Hives    • Hyperlipidemia    • Hypertension    • Infertility, female    • Interstitial cystitis    • Migraine     sees neurologist   • Motion sickness    • Palpitations    • PONV (postoperative nausea and vomiting)    • Psychiatric disorder    • TIA (transient ischemic attack) 09/2022    per pt --"had MRI and 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"   • Urinary incontinence     wears Pads   • Wears glasses Social History     Socioeconomic History   • Marital status:      Spouse name: Not on file   • Number of children: 0   • Years of education: Not on file   • Highest education level: Not on file   Occupational History   • Occupation: unemployed   Tobacco Use   • Smoking status: Former     Packs/day: 0 50     Years: 10 00     Pack years: 5 00     Types: Cigarettes     Quit date:      Years since quittin 2     Passive exposure: Never   • Smokeless tobacco: Never   Vaping Use   • Vaping Use: Never used   Substance and Sexual Activity   • Alcohol use: Never   • Drug use: No   • Sexual activity: Not on file     Comment: defer   Other Topics Concern   • Not on file   Social History Narrative    Who lives in your home: Alone     What type of home do you live in: Apartment     Age of your home: 1952 built     How long have you been living there: 5 yrs     Type of heat: forced hot air     Type of fuel: electric     What type of jamin is in your bedroom: carpet jamin     Do you have the following in or near your home:    Air products: Humidifier in the bedroom/kitchen     Pests: none     Pets: none     Are pets allowed in bedroom: N/A     Open fields, wooded areas nearby: No     Basement: [id number]-papps-no mold     Exposure to second hand smoke: No    Central air     Habits:    Caffeine: Hot tea 1 cup daily- ice tea 1 cup in the afternoon    Chocolate: Occasionally      Social Determinants of Health     Financial Resource Strain: Not on file   Food Insecurity: No Food Insecurity   • Worried About Running Out of Food in the Last Year: Never true   • Ran Out of Food in the Last Year: Never true   Transportation Needs: No Transportation Needs   • Lack of Transportation (Medical): No   • Lack of Transportation (Non-Medical):  No   Physical Activity: Not on file   Stress: Not on file   Social Connections: Not on file   Intimate Partner Violence: Not on file   Housing Stability: Low Risk    • Unable to Pay for Housing in the Last Year: No   • Number of Places Lived in the Last Year: 1   • Unstable Housing in the Last Year: No      Family History   Problem Relation Age of Onset   • Lung cancer Mother 61   • Brain cancer Mother 61   • Diabetes Father    • Depression Father    • Other Father         septic   • Allergies Sister    • Hashimoto's thyroiditis Sister    • Abdominal aortic aneurysm Sister    • Diabetes Sister    • No Known Problems Sister    • No Known Problems Maternal Grandmother    • No Known Problems Maternal Grandfather    • No Known Problems Paternal Grandmother    • No Known Problems Paternal Grandfather    • Hodgkin's lymphoma Brother    • No Known Problems Brother    • No Known Problems Maternal Aunt    • Diabetes Other      Past Surgical History:   Procedure Laterality Date   • COLONOSCOPY     • DILATION AND CURETTAGE OF UTERUS     • EGD     • EXPLORATORY LAPAROTOMY      for infertility   • HAND SURGERY      Hand excision of tendon cyst   • VA LAPAROSCOPIC APPENDECTOMY N/A 05/03/2022    Procedure: APPENDECTOMY LAPAROSCOPIC;  Surgeon:  Casper Homans, MD;  Location: [facility];  Service: General   • VA TEAEC W/PATCH GRF CAROTID VERTB [place] Left 1/31/2023    Procedure: EXPLORATION OF LEFT CAROTID ARTERY; ABORTED ENDARTERECTOMY ARTERY CAROTID;  Surgeon: Juan Carlos Cantu DO;  Location: [facility];  Service: Vascular   • SUPERIOR MESTENTERIC ARTERY STENT     • WISDOM TOOTH EXTRACTION         Current Outpatient Medications:   •  apixaban (Eliquis) 5 mg, Take 1 tablet (5 mg total) by mouth 2 (two) times a day, Disp: 180 tablet, Rfl: 5  •  Ascorbic Acid (vitamin C) 1000 MG tablet, Take 1,000 mg by mouth daily, Disp: , Rfl:   •  aspirin 81 mg chewable tablet, Chew 1 tablet (81 mg total) daily, Disp: , Rfl: 0  •  Biotin w/ Vitamins C & E (HAIR/SKIN/NAILS PO), Take by mouth, Disp: , Rfl:   •  butalbital-acetaminophen-caffeine (FIORICET,ESGIC) -40 mg per tablet, Take 1 tablet by mouth every 6 (six) hours as needed for headaches, Disp: 90 tablet, Rfl: 0  •  chlordiazepoxide-clidinium (LIBRAX) 5-2 5 mg per capsule, Take 1 capsule by mouth 2 (two) times a day as needed for indigestion or cramping, Disp: , Rfl:   •  Cholecalciferol 25 MCG (1000 UT) tablet, Take 1,000 Units by mouth daily, Disp: , Rfl:   •  clotrimazole-betamethasone (LOTRISONE) 1-0 05 % cream, , Disp: , Rfl:   •  diltiazem (DILACOR XR) 180 MG 24 hr capsule, Take 1 capsule (180 mg total) by mouth daily, Disp: 90 capsule, Rfl: 3  •  Docusate Sodium (COLACE PO), Take by mouth daily at bedtime, Disp: , Rfl:   •  Evolocumab 140 MG/ML SOAJ, Inject 1 mL (140 mg total) under the skin every 14 (fourteen) days, Disp: 2 mL, Rfl: 5  •  ezetimibe (ZETIA) 10 mg tablet, Take 1 tablet (10 mg total) by mouth daily, Disp: 90 tablet, Rfl: 3  •  famotidine (PEPCID) 20 mg tablet, Take 20 mg by mouth daily at bedtime, Disp: , Rfl:   •  fexofenadine (ALLEGRA) 180 MG tablet, Take 180 mg by mouth daily, Disp: , Rfl:   •  fluticasone-vilanterol (BREO ELLIPTA) 200-25 MCG/INH inhaler, Inhale 1 puff daily Rinse mouth after use , Disp: 3 Inhaler, Rfl: 3  •  furosemide (LASIX) 40 mg tablet, TAKE ONE TABLET BY MOUTH ONCE DAILY, Disp: 90 tablet, Rfl: 0  •  Galcanezumab-gnlm 120 MG/ML SOAJ, Inject 120 mg under the skin every 30 (thirty) days, Disp: 1 mL, Rfl: 11  •  hydrOXYzine HCL (ATARAX) 25 mg tablet, Take 2 tablets (50 mg total) by mouth daily at bedtime, Disp: 90 tablet, Rfl: 3  •  LORazepam (ATIVAN) 1 mg tablet, Take 1 tablet (1 mg total) by mouth every 6 (six) hours as needed for anxiety, Disp: 90 tablet, Rfl: 0  •  metoprolol succinate (TOPROL-XL) 100 mg 24 hr tablet, Take 1 tablet (100 mg total) by mouth 2 (two) times a day, Disp: 180 tablet, Rfl: 0  •  montelukast (SINGULAIR) 10 mg tablet, TAKE ONE TABLET BY MOUTH DAILY AT BEDTIME, Disp: 90 tablet, Rfl: 0  •  MULTIPLE VITAMIN PO, Take by mouth, Disp: , Rfl:   •  omeprazole (PriLOSEC) 40 MG capsule, Take 1 capsule (40 mg total) by mouth daily, Disp: 309 capsule, Rfl: 4  •  ondansetron (Zofran ODT) 4 mg disintegrating tablet, Take 1 tablet (4 mg total) by mouth every 6 (six) hours as needed for nausea or vomiting, Disp: 60 tablet, Rfl: 0  •  phenazopyridine (PYRIDIUM) 200 mg tablet, Take 1 tablet (200 mg total) by mouth 3 (three) times a day as needed for bladder spasms, Disp: 30 tablet, Rfl: 1  •  Probiotic Product (PROBIOTIC-10 PO), Take 1 tablet by mouth in the morning, Disp: , Rfl:   •  spironolactone (ALDACTONE) 25 mg tablet, Take 1 tablet (25 mg total) by mouth 2 (two) times a day, Disp: 180 tablet, Rfl: 0  •  traMADol (Ultram) 50 mg tablet, Take 2 tablets (100 mg total) by mouth every 8 (eight) hours as needed for moderate pain, Disp: 60 tablet, Rfl: 0  •  Turmeric 500 MG CAPS, , Disp: , Rfl:   •  Zinc 100 MG TABS, Take by mouth daily, Disp: , Rfl:   •  naloxone (NARCAN) 4 mg/0 1 mL nasal spray, Administer 1 spray into a nostril  If no response after 2-3 minutes, give another dose in the other nostril using a new spray , Disp: 1 each, Rfl: 1    Current Facility-Administered Medications:   •  cyanocobalamin injection 1,000 mcg, 1,000 mcg, Intramuscular, Q30 Days, Lorena Wahl DO, 1,000 mcg at 11/28/22 1017        Review of Systems:  Review of Systems   Constitutional: Negative for activity change, fever and unexpected weight change  HENT: Negative for facial swelling, nosebleeds and voice change  Respiratory: Negative for chest tightness, shortness of breath and wheezing  Cardiovascular: Negative for chest pain, palpitations and leg swelling  Gastrointestinal: Negative for abdominal distention  Genitourinary: Negative for hematuria  Musculoskeletal: Negative for arthralgias  Skin: Negative for color change, pallor, rash and wound  Neurological: Negative for dizziness, seizures and syncope  Psychiatric/Behavioral: Negative for agitation  Physical Exam:  Physical Exam  Vitals reviewed  Constitutional:       Appearance: She is well-developed  HENT:      Head: Normocephalic and atraumatic  Cardiovascular:      Rate and Rhythm: Normal rate and regular rhythm  Heart sounds: Normal heart sounds  Pulmonary:      Effort: Pulmonary effort is normal       Breath sounds: Normal breath sounds  Abdominal:      Palpations: Abdomen is soft  Musculoskeletal:         General: Normal range of motion  Cervical back: Normal range of motion and neck supple  Skin:     General: Skin is warm and dry  Neurological:      Mental Status: She is alert and oriented to person, place, and time  Psychiatric:         Behavior: Behavior normal          Thought Content: Thought content normal          Judgment: Judgment normal          This note was completed in part utilizing [employer] Software  Grammatical errors, random word insertions, spelling mistakes, and incomplete sentences can be an occasional consequence of this system secondary to software limitations, ambient noise, and hardware issues  If you have any questions or concerns about the content, text, or information contained within the body of this dictation, please contact the provider for clarification

## 2023-03-20 NOTE — TELEPHONE ENCOUNTER
Patient reports increase in nausea x 2-3 weeks  Omeprazole, pepcid, zofran, and GasX have given little relief  She notes despite the increase in nausea and indigestion she has not had any vomiting  Has an upcoming appt scheduled for 3/27, but is questioning if there is anything that she can take in the meantime to give her some relief  Please advise  Thank you

## 2023-03-20 NOTE — PROGRESS NOTES
Speech-Language Pathology Initial Evaluation    Today's date: 3/20/2023  Patient’s name: Jonathan Baig  : 1960  MRN: [id number]  Safety measures: ALLERGIES Multiple, dysphagia related--- ARTIFICIAL SWEETENERS, PRESERVATIVES  Referring provider: Silviano Beltran DO    Encounter Diagnosis     ICD-10-CM    1  Dysphagia, oropharyngeal phase  R13 12       2  Speech disorder  R47 9 Ambulatory Referral to Speech Therapy      3  Other voice and resonance disorders  R49 8       4  Injury of left facial nerve, sequela  S04  52XS              Visit tracking:  -Referring provider: Epic  -Billing guidelines: CMS  -Visit #1  -Insurance: Medicare ([employer] secondary)  -RE due 2023       Subjective comments: Patient reports difficulties with chewing/eating, speech clarity since surgery resulting in nerve complications, patient with facial droop present, also notes vocal fatigue and hoarseness since onset       How did the patient hear about us? Physician    Patient's goal(s): to improve speech/eating    Reason for referral: Change in vocal quality, Decreased speech intelligibility and Difficulty swallowing solids or liquids  Prior functional status: Communication effective and appropriate in all situations and Swallowing WNL  Clinically complex situations: N/A    History: Patient is a 70 y o  female who was referred to outpatient skilled Speech Therapy services for a dysphagia and motor speech evaluation  Pt with medical history significant for L facial nerve injury following surgery on 2023 resulting in dysphagia, other voice disorders, dysarthria at this time  Variable levels of hoarseness noted upon assessment this date, however due to time limitations, unable to fully assess voicing at this time  Plan to further assess in future session  Recommendations for ENT to further assess voice concerns    Pt was previously able to function [facility] speech quality/accuracy, [facility] regular/thin diet  however now presents with reduced speech accuracy, variable hoarse VQ, requires softer food textures at times impacting functionality within environment  Pt requires skilled SLP interventions to reduce risk of further decline in function, reduced participation in conversations within environment, aspiration and additional associated complications  Hearing: [facility] for testing  Vision: [facility] for testing    Home environment/lifestyle: DNt this date due to time limitations  Highest level of education: DNt this date due to time limitations  Vocational status: DNt this date due to time limitations    Mental status: Alert  Behavior status: Cooperative  Communication modalities: Verbal  Rehabilitation prognosis: Good rehab potential to reach the established goals    Assessments        DYSPHAGIA EVALUATION:     -Reason for referral: Signs/symptoms of dysphagia    -Subjective report of swallowing difficulty: difficulties with mastication, effortful swallows required    -Eating Assessment Tool (EAT-10) Swallowing Screening Tool is a patient self-assessment tool that rates the percieved impairment a swallowing disorder has on the Functional, Physical, and Emotional aspects of one's life  EAT-10 score: ranging 10-11/40    -Difficulty swallowing: Solids    -Current diet (solids): Regular  -Current diet (liquids): Thin  -Alternative Feeding Method?: No    -Facial appearance Left facial droop and Asymmetric smile   -Dentition Adequate   -Labial function Decreased ROM (left side), Decreased strength (left side) and Decreased coordination   -Lingual function WFL   -Velar function Symmetrical       Strength and Endurance Testing: The IOPI Pro is used by medical professionals to measure, evaluate, and increase the strength and endurance of the tongue and lip in patients with oral motor disorders, including dysphagia and dysarthria    The IOPI measures the maximum pressure (Pmax) a patient can produce in an air-filled bulb when it is compressed as hard as possible by the tongue or lip against a hard surface (e g  The palate or teeth, respectively)  Pmax is a measure of strength, expressed in kilopascals (kPa, an international unit of pressure)  Right lip Peak Max after 3 trials: 18 Norm for gender is 35   Left lip Peak Max after 3 trials: 17 Norm for gender is 35       LIQUID CONSISTENCY TESTIN  Liquid Consistency (Thin) - water   • Administered by: Self-fed  • Strategies, attempts, and responses: None    CLINICAL FINDINGS:  • Oral phase impairments: WFL  • Pharyngeal Phase Impairments: Other (multiple swallows to clear noted)    *Laryngeal excursion upon palpation: variable HLE noted      SOLID CONSISTENCY TESTING:  • **DNT d/t allergies this date, patient to bring in food next session    SWALLOWING DIAGNOSTIC IMPRESSION:  -Swallowing diagnosis/severity: mild oropharyngeal phase dysphagia    -Factors affecting performance: None    -Safety concerns: Risk for aspiration and Risk for inadequate nutrition/ hydration    -Risk factors: None    SAFETY PRECAUTIONS:  -Supervision: Independent     -Strategies: Small sips and bites when eating and Alternate liquids and solids    -Positioning: Upright position during meals    -Compensatory strategies: Other:n/a    -Recommend (solids): Regular  (as tolerated)  -Recommend (liquids): Thin    -Recommend (medications): as tolerated (patient reports improved accuracy when taking in smaller amounts-- 1-2 pills)    REFERRALS: Otolaryngology      Motor Speech Evaluation:  See above for OM examination  Patient presents with mild dysarthria characterized by Imprecise articulation  Patient noted with reduced OM strength/rom/coordination impacting articulation accuracy  Patient has demonstrated 100% intelligibility throughout assessment, however reports that speech feels effortful and reduced accuracy over time  Patient has adapted for use of some strategies to improve speech accuracy           Goals    Short Term Goals: 1  Patient will utilize over-articulation 80% of the time while orally reading sentence/paragraph length material to facilitate increased speech intelligibility, to be achieved in 4-6 weeks  2  Speech clarity during 1:1 conversation will improve over 4 weeks using the trained compensatory strategies, to be achieved in 4-6 weeks  3  Patient will improve HLE/pharyngeal/OM strength and timing with use of HLE exercises and additional appropriate interventions in order to facilitate improved toleration of liquids and solids demonstrating 90% free of overt s/s of aspiration/penetration, to be achieved in 4-6 weeks  4  Patient will demonstrate consistent use of compensatory strategies (e g , upright positioning, small bites/sips, slow rate, alternation of consistencies, multiple swallows, effortful swallow, chin tuck, head turn, etc ) with 90% accuracy to reduce risk of  penetration/aspiration during consumption of liquids/solids, to be achieved in 4-6 weeks  Long Term Goals:    1  Pt will tolerate least restrictive with no overt s/s of aspiration 90% of the time independently for use of compensatory strategies allowing for optimized hydration/nutrition  2  Patient will demonstrate 90% speech accuracy at a independent conversational level with use of trained compensatory strategies in order to improve communication accuracy within environment, to be achieved by discharge  Impressions/Recommendations    Impressions:   -Patient presents with mild oropharyngeal dysphagia, mild dysarthria at this time, noted to have reduced OM strength/rom/coordination impacting toleration of liquids/solids, impacting speech accuracy  Patient would benefit from skilled SLP interventions to facilitate improved communication accuracy, improved toleration of liquids/solids and to train in strategies to improve communication/swallowing accuracy       Recommendations:  -Patient would benefit from outpatient skilled Speech Therapy services: Speech/ language therapy and Dysphagia therapy    -Frequency: 1x weekly  -Duration: 4-6 weeks    -Intervention certification from: 3/98/7869  -Intervention certification to: 7/6/4389         Speech/language treatment completed this date:    Patient trained in labial OM strength/ROM/coordination exercises this date in order to facilitate improved labial strength, ROM and coordination to allow for improved toleration of liquids and solids through improved mastication effectiveness/bolus formation and improved consonant contact for improved speech intelligibility  Patient required min verbal and visual cues this date for improved accuracy  Patient was able to return demonstrate understanding, verbalized understanding for completion within home environment

## 2023-03-20 NOTE — TELEPHONE ENCOUNTER
Regarding: Worsening nausea w/indegestion  ----- Message from Kaveh Garcia sent at 3/20/2023  3:51 PM EDT -----  "Sherin Galindo been getting getting a lot of belching, nausea and indigestion in the fast few weeks and I'm just sick to my stomach all of the time   My medication is not working anymore "

## 2023-03-21 ENCOUNTER — TELEPHONE (OUTPATIENT)
Dept: FAMILY MEDICINE CLINIC | Facility: CLINIC | Age: 63
End: 2023-03-21

## 2023-03-21 RX ORDER — TRAMADOL HYDROCHLORIDE 50 MG/1
100 TABLET ORAL EVERY 8 HOURS PRN
Qty: 60 TABLET | Refills: 0 | Status: SHIPPED | OUTPATIENT
Start: 2023-03-21 | End: 2023-03-31 | Stop reason: SDUPTHER

## 2023-03-21 NOTE — TELEPHONE ENCOUNTER
OV 12/19/22 G[facility]  FU 3/27/3 [facility]  EGD 4/7/22 & colonoscopy 7/13/22    H/O as per last OV  Gastroesophageal reflux disease without esophagitis  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HCC)  Paroxysmal atrial fibrillation (HCC)  Left upper quadrant abdominal pain  CAMILLE (obstructive sleep apnea)  Pain of upper abdomen  Hepatic steatosis    Medications   Omeprazole  Librax  linzess    LVM for pt to call back to discuss and my chart messages sent

## 2023-03-21 NOTE — TELEPHONE ENCOUNTER
Patient is scheduled for AWV 8/15/2023 - she is wondering if you'd like her to have BW done prior to the appt?     (she did have a1c, lipid, cbc, bmp done 2/2023)

## 2023-03-24 ENCOUNTER — OFFICE VISIT (OUTPATIENT)
Dept: PHYSICAL THERAPY | Facility: REHABILITATION | Age: 63
End: 2023-03-24

## 2023-03-24 DIAGNOSIS — G89.29 CHRONIC ABDOMINAL PAIN: ICD-10-CM

## 2023-03-24 DIAGNOSIS — R10.9 CHRONIC ABDOMINAL PAIN: ICD-10-CM

## 2023-03-24 DIAGNOSIS — R39.89 BLADDER PAIN: ICD-10-CM

## 2023-03-24 DIAGNOSIS — M62.89 PELVIC FLOOR DYSFUNCTION: ICD-10-CM

## 2023-03-24 DIAGNOSIS — K59.09 OTHER CONSTIPATION: Primary | ICD-10-CM

## 2023-03-24 NOTE — PROGRESS NOTES
Daily Note     Today's date: 3/24/2023  Patient name: Jonathan Baig  : 1960  MRN: [id number]  Referring provider: Dustin Massey, *  Dx:   Encounter Diagnosis     ICD-10-CM    1  Other constipation  K59 09       2  Chronic abdominal pain  R10 9     G89 29       3  Bladder pain  R39 89       4  Pelvic floor dysfunction  M62 89           Start Time: 1130  Stop Time: 1230  Total time in clinic (min): 60 minutes    Subjective: Pt did 1 day of her bladder diary  She did not bring it with her  She will finish and bring the rest with her next time  She voided about 8-9 times during the day and 1 time at night  She had incomplete stream at times  She did go without a pad yesterday and did not feel any leakage  Pt is not implementing bowel mechanics       Objective: See treatment diary below    Pt provided verbal consent prior to and throughout objective assessment       Tenderness:  Piriformis: pos b/l  Post pelvic floor: pos b/l  Obturator Internus: pos vb/l  Adductors: pos b/l    Diastasis Recti  Not assessed     Lumbar AROM   WNL     SLS  Poor load transfer b/l, ipsilateral lean    Other Comments:  Poor PFM contraction, compensates with breath holding    MMT:   Left Right   Hip flex 3+ 4-   Hip ABD 3+ 3+   Hip ext 4- 4   Hip IR 4 4   Hip ER 4 4   Knee flex 4+ 4+   Knee ext 4+ 4+   Ankle DF 4+ 4+               Special Tests: will assess NV    Left Right   EMANI     FADIR     Post Thigh Thrust     Gillets     ASLR     SI compression     SI distraction                        Assessment: PT cued pt on bowel mechanics and posture to alleviate downward strain on pelvic floor as well as exhaling during bearing down  Initiated urge deferral strategy to improve intervoid interval, decrease urge incontinence and decrease PFM overactivity  Performed objective assessment as noted above   Pt presents with proximal hip weakness, poor load transfer, tenderness in pelvic floor and glutes and adductors, and poor coordination of PFM although improved with cueing  Pt to continue with bladder diary but to trial incorporation of urge deferral strategy as able  Tolerated treatment well  Patient would benefit from continued PT      Plan: Continue per plan of care  Review bladder diary  Assess response to urge deferral strategy  Assess special testing and abdominal activation and myofascial restrictions  Initiate gentle stretches for PFM relaxation and LE/lumbar flexibility  Precautions: a-fib, anxiety, HTN  asthma  Impairments/functional limitations: urinary urgency, frequency, leakage, constipation, pelvic pain   Interstitial cystitits  Daily Treatment Diary    Manuals 3/17 3/24        External assess prn         Internal assess prn         Objective assessment nv performed                  Neuro Re-Ed          TAC          PF  review        TAC+PF          TAC+PF with march          TAC+PF with alt knee fall out          TAC+PF with ball squeeze          TAC+PF with SLR          TAC+PF with s/l ABD          TAC+PF with bridge                              360 deg breathing  nv        Ther Ex          Supine piri stretch  nv        LTR  nv        Seated piri stretch  nv        Supine butterfly stretch  nv        Seated lucho pose with 360 deg breathing  nv                                      Ther Activity          Bladder Diary  Review, issued  review        Urge delay:QF  review        Urge delay: HR          Freeze, breathe, squeeze   review        The knack          Decreasing pad usage review         Bowel mechanics/posture review Review         Healthy bladder habits  Review, sipping regularly, inc water intake  review                            Gait Training                              Modalities

## 2023-03-27 ENCOUNTER — OFFICE VISIT (OUTPATIENT)
Dept: GASTROENTEROLOGY | Facility: CLINIC | Age: 63
End: 2023-03-27

## 2023-03-27 ENCOUNTER — APPOINTMENT (OUTPATIENT)
Dept: SPEECH THERAPY | Facility: REHABILITATION | Age: 63
End: 2023-03-27

## 2023-03-27 VITALS
SYSTOLIC BLOOD PRESSURE: 122 MMHG | HEIGHT: 64 IN | TEMPERATURE: 99.4 F | BODY MASS INDEX: 32.27 KG/M2 | WEIGHT: 189 LBS | DIASTOLIC BLOOD PRESSURE: 74 MMHG

## 2023-03-27 DIAGNOSIS — K76.0 HEPATIC STEATOSIS: ICD-10-CM

## 2023-03-27 DIAGNOSIS — Z12.11 COLON CANCER SCREENING: ICD-10-CM

## 2023-03-27 DIAGNOSIS — K21.9 GASTROESOPHAGEAL REFLUX DISEASE WITHOUT ESOPHAGITIS: Primary | ICD-10-CM

## 2023-03-27 DIAGNOSIS — G43.001 MIGRAINE WITHOUT AURA AND WITH STATUS MIGRAINOSUS, NOT INTRACTABLE: ICD-10-CM

## 2023-03-27 DIAGNOSIS — R10.9 CHRONIC ABDOMINAL PAIN: ICD-10-CM

## 2023-03-27 DIAGNOSIS — G89.29 CHRONIC ABDOMINAL PAIN: ICD-10-CM

## 2023-03-27 DIAGNOSIS — K58.1 IRRITABLE BOWEL SYNDROME WITH CONSTIPATION: ICD-10-CM

## 2023-03-27 DIAGNOSIS — K55.069 MESENTERIC ARTERY THROMBOSIS (HCC): ICD-10-CM

## 2023-03-27 NOTE — PROGRESS NOTES
[facility] - Outpatient Follow-up Note  Tricia Buchanan 70 y o  female MRN: [id number]  Encounter: [id number]      Assessment and Plan    1  Chronic abdominal pain  The patient has chronic lower abdominal/groin pain  Initially CT scan was obtained and revealing her appendix, which was located just left of the midline, appeared abnormally thickened without distension, appendicolith, or periappendiceal inflammation   She underwent appendectomy 5/3/22  Yaquelin Vasquez had continued pain so she saw her vascular team who recommended colonoscopy  Colonoscopy was negative for etiology of her pain  Her PCP started her on librax which works well however the patient states he recently discontinued this and she is not sure why  -Discuss restarting Librax with PCP, if unable to do so can consider bentyl or Levsin      2  History of PUD  3  GERD  The patient has acid reflux which is generally well controlled on omeprazole 40 mg every morning and OTC pepcid nightly however the patient states for the past couple of weeks she has had increased reflux and nausea  Zofran with some relief  Prior EGD 5/28/19 revealed multiple superficial ulcers in the antrum with negative H pylori testing  Etiology thought to be NSAIDs  Repeat EGD 4/7/22 normal including esophogeal biopsies  -Avoid NSAIDS   -Continue daily omeprazole 40mg and OTC pepcid    -Patient states carafate was previously working well, will resume this  -Continue zofran, if not effective can consider phenergan vs compazine   -Discussed starting and adhering to an anti reflux diet with a handout provided at her visit     4  Fatty liver  Seen on CT scan 11/29/21  U/S elastography with S3 steatosis but no fibrosis  Last CMP 9/21/22 with normal liver enzymes  -Routine monitoring of his CMP  -Recommend healthy diet and routine exercise     5   IBS-C  The patient has a history of IBS-C currently well controlled on miralax and colace  -Continue miralax  -Continue Colace "     6  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HCC)  S/p stent at [facility]  Recent duplex study normal with the stent in tact and patent  Inferior mesenteric artery unable to be visualized secondary to overlying bowel gas  The patient continues with vascular   - continue care with vascular      7  Colon cancer screening  Last colonoscopy 7/13/22 normal exam except for mild diverticulosis and small internal hemorrhoids  - high fiber diet with a goal of 30g daily  - repeat colonoscopy 2039 or sooner if clinically indicated    Follow up 3 months     ______________________________________________________________________    History of Present Illness  Gama Arellano is a 70 y o  female here for follow up evaluation of multiple GI issues  His main complaint is nausea which has been worse over the past few weeks in addition to increased reflux  She is on omeprazole and OTC pepcid which typically works for her  Does not follow a GERD diet  Otherwise no complaints  Last EGD 4/22: normal  Last colonoscopy 7/22: normal aside for hemorrhoids      Review of Systems   Constitutional: Negative for activity change, appetite change, chills, fatigue, fever and unexpected weight change  Gastrointestinal: Positive for nausea  Musculoskeletal: Negative for back pain and gait problem  Psychiatric/Behavioral: Negative for confusion         Past Medical History  Past Medical History:   Diagnosis Date   • [facility]   • Allergic    • Anxiety    • Arthritis    • Asthma    • Back pain     and muscle pain   • Bruises easily    • Chronic pain disorder     Interstitial pain   • Colon polyp    • Dental bridge present    • Depression    • Eczema    • GERD (gastroesophageal reflux disease)    • Heart murmur    • History of blood clots     per pt \"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\"   • History of pneumonia    • Hives    • Hyperlipidemia    • Hypertension    • Infertility, female    • Interstitial cystitis    • Migraine     " "sees neurologist   • Motion sickness    • Palpitations    • PONV (postoperative nausea and vomiting)    • Psychiatric disorder    • TIA (transient ischemic attack) 2022    per pt --\"had MRI and 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\"   • Urinary incontinence     wears Pads   • Wears glasses        Past Social history  Past Surgical History:   Procedure Laterality Date   • COLONOSCOPY     • DILATION AND CURETTAGE OF UTERUS     • EGD     • EXPLORATORY LAPAROTOMY      for infertility   • HAND SURGERY      Hand excision of tendon cyst   • [place] LAPAROSCOPIC APPENDECTOMY N/A 2022    Procedure: APPENDECTOMY LAPAROSCOPIC;  Surgeon:  Uma Mcnamara MD;  Location: [facility];  Service: General   • WY TEAEC W/PATCH GRF CAROTID VERTB Víctor Left 2023    Procedure: EXPLORATION OF LEFT CAROTID ARTERY; ABORTED ENDARTERECTOMY ARTERY CAROTID;  Surgeon: Tristin Rangel DO;  Location: [facility];  Service: Vascular   • SUPERIOR MESTENTERIC ARTERY STENT     • WISDOM TOOTH EXTRACTION       Social History     Socioeconomic History   • Marital status:      Spouse name: Not on file   • Number of children: 0   • Years of education: Not on file   • Highest education level: Not on file   Occupational History   • Occupation: unemployed   Tobacco Use   • Smoking status: Former     Packs/day: 0 50     Years: 10 00     Pack years: 5 00     Types: Cigarettes     Quit date: 2019     Years since quittin 2     Passive exposure: Never   • Smokeless tobacco: Never   Vaping Use   • Vaping Use: Never used   Substance and Sexual Activity   • Alcohol use: Never   • Drug use: No   • Sexual activity: Not on file     Comment: defer   Other Topics Concern   • Not on file   Social History Narrative    Who lives in your home: Alone     What type of home do you live in: [address]     Age of your home: 1952 built     How long have you been living there: 5 yrs     Type of heat: forced hot air     Type of fuel: electric     What " type of jamin is in your bedroom: carpet jamin     Do you have the following in or near your home:    Air products: Humidifier in the bedroom/kitchen     Pests: none     Pets: none     Are pets allowed in bedroom: N/A     Open fields, wooded areas nearby: No     Basement: [id number]-[id number]-no mold     Exposure to second hand smoke: No    Central air     Habits:    Caffeine: Hot tea 1 cup daily- ice tea 1 cup in the afternoon    Chocolate: Occasionally      Social Determinants of Health     Financial Resource Strain: Not on file   Food Insecurity: No Food Insecurity   • Worried About Running Out of Food in the Last Year: Never true   • Ran Out of Food in the Last Year: Never true   Transportation Needs: No Transportation Needs   • Lack of Transportation (Medical): No   • Lack of Transportation (Non-Medical):  No   Physical Activity: Not on file   Stress: Not on file   Social Connections: Not on file   Intimate Partner Violence: Not on file   Housing Stability: Low Risk    • Unable to Pay for Housing in the Last Year: No   • Number of Places Lived in the Last Year: 1   • Unstable Housing in the Last Year: No     Social History     Substance and Sexual Activity   Alcohol Use Never     Social History     Substance and Sexual Activity   Drug Use No     Social History     Tobacco Use   Smoking Status Former   • Packs/day: 0 50   • Years: 10 00   • Pack years: 5 00   • Types: Cigarettes   • Quit date:    • Years since quittin 2   • Passive exposure: Never   Smokeless Tobacco Never       Past Family History  Family History   Problem Relation Age of Onset   • Lung cancer Mother 61   • Brain cancer Mother 61   • Diabetes Father    • Depression Father    • Other Father         septic   • Allergies Sister    • Hashimoto's thyroiditis Sister    • Abdominal aortic aneurysm Sister    • Diabetes Sister    • No Known Problems Sister    • No Known Problems Maternal Grandmother    • No Known Problems Maternal Grandfather    • No Known Problems Paternal Grandmother    • No Known Problems Paternal Grandfather    • Hodgkin's lymphoma Brother    • No Known Problems Brother    • No Known Problems Maternal Aunt    • Diabetes Other        Current Medications  Current Outpatient Medications   Medication Sig Dispense Refill   • apixaban (Eliquis) 5 mg Take 1 tablet (5 mg total) by mouth 2 (two) times a day 180 tablet 5   • Ascorbic Acid (vitamin C) 1000 MG tablet Take 1,000 mg by mouth daily     • aspirin 81 mg chewable tablet Chew 1 tablet (81 mg total) daily  0   • Biotin w/ Vitamins C & E (HAIR/SKIN/NAILS PO) Take by mouth     • butalbital-acetaminophen-caffeine (FIORICET,ESGIC) -40 mg per tablet Take 1 tablet by mouth every 6 (six) hours as needed for headaches 90 tablet 0   • Cholecalciferol 25 MCG (1000 UT) tablet Take 1,000 Units by mouth daily     • clotrimazole-betamethasone (LOTRISONE) 1-0 05 % cream      • diltiazem (DILACOR XR) 180 MG 24 hr capsule Take 1 capsule (180 mg total) by mouth daily 90 capsule 3   • Docusate Sodium (COLACE PO) Take by mouth daily at bedtime     • Evolocumab 140 MG/ML SOAJ Inject 1 mL (140 mg total) under the skin every 14 (fourteen) days 2 mL 5   • ezetimibe (ZETIA) 10 mg tablet Take 1 tablet (10 mg total) by mouth daily 90 tablet 3   • famotidine (PEPCID) 20 mg tablet Take 20 mg by mouth daily at bedtime     • fexofenadine (ALLEGRA) 180 MG tablet Take 180 mg by mouth daily     • fluticasone-vilanterol (BREO ELLIPTA) 200-25 MCG/INH inhaler Inhale 1 puff daily Rinse mouth after use   3 Inhaler 3   • furosemide (LASIX) 40 mg tablet TAKE ONE TABLET BY MOUTH ONCE DAILY 90 tablet 0   • Galcanezumab-gnlm 120 MG/ML SOAJ Inject 120 mg under the skin every 30 (thirty) days 1 mL 11   • hydrOXYzine HCL (ATARAX) 25 mg tablet Take 2 tablets (50 mg total) by mouth daily at bedtime 90 tablet 3   • LORazepam (ATIVAN) 1 mg tablet Take 1 tablet (1 mg total) by mouth every 6 (six) hours as needed for anxiety 90 tablet 0   • metoprolol succinate (TOPROL-XL) 100 mg 24 hr tablet Take 1 tablet (100 mg total) by mouth 2 (two) times a day 180 tablet 0   • montelukast (SINGULAIR) 10 mg tablet TAKE ONE TABLET BY MOUTH DAILY AT BEDTIME 90 tablet 0   • MULTIPLE VITAMIN PO Take by mouth     • naloxone (NARCAN) 4 mg/0 1 mL nasal spray Administer 1 spray into a nostril  If no response after 2-3 minutes, give another dose in the other nostril using a new spray  1 each 1   • omeprazole (PriLOSEC) 40 MG capsule Take 1 capsule (40 mg total) by mouth daily 309 capsule 4   • ondansetron (Zofran ODT) 4 mg disintegrating tablet Take 1 tablet (4 mg total) by mouth every 6 (six) hours as needed for nausea or vomiting 60 tablet 0   • phenazopyridine (PYRIDIUM) 200 mg tablet Take 1 tablet (200 mg total) by mouth 3 (three) times a day as needed for bladder spasms Not to be used for more than 2 consecutive days  30 tablet 0   • Probiotic Product (PROBIOTIC-10 PO) Take 1 tablet by mouth in the morning     • spironolactone (ALDACTONE) 25 mg tablet Take 1 tablet (25 mg total) by mouth 2 (two) times a day 180 tablet 0   • traMADol (Ultram) 50 mg tablet Take 2 tablets (100 mg total) by mouth every 8 (eight) hours as needed for moderate pain 60 tablet 0   • Turmeric 500 MG CAPS      • Zinc 100 MG TABS Take by mouth daily     • chlordiazepoxide-clidinium (LIBRAX) 5-2 5 mg per capsule Take 1 capsule by mouth 2 (two) times a day as needed for indigestion or cramping (Patient not taking: Reported on 3/27/2023)       Current Facility-Administered Medications   Medication Dose Route Frequency Provider Last Rate Last Admin   • cyanocobalamin injection 1,000 mcg  1,000 mcg Intramuscular Q30 Days Titus Guido DO   1,000 mcg at 11/28/22 1017       Allergies  Allergies   Allergen Reactions   • Ace Inhibitors Angioedema and Anaphylaxis     Anaphylaxis   • Benicar [Olmesartan] Angioedema     See Allergy note from 9/11/2008    Swollen ankles/legs   • "Hydralazine Other (See Comments)     Lip swelling  Flank pain   • Other Anaphylaxis and Other (See Comments)     Other reaction(s): angioadema  Other reaction(s): Other (See Comments)  Preservatives- itching throat closes, hi  Other reaction(s): angioadema  E Z Cat - hives throat closes itching,  Preservatives- itching throat closes, hi  Artificial sweeteners   • Valsartan Angioedema     Lips, face swollen   • Sulfa Antibiotics Hives     stuffiness,itching,hives,throat closing--per pt \"sometimes able to take depending on the brand and the filler or possibly preservatives in it\"   • Ampicillin Hives     Depends on brand some preservatives can react  Has recently tolerated oral amoxicillin  • Motrin [Ibuprofen] Other (See Comments)     Per pt \" told not to take due to A Fib\"   • Wound Dressing Adhesive Other (See Comments)     Per pt Adhesive on EKG leads caused redness         The following portions of the patient's history were reviewed and updated as appropriate: allergies, current medications, past medical history, past social history, past surgical history and problem list       Vitals  Vitals:    03/27/23 0841   BP: 122/74   BP Location: Right arm   Patient Position: Sitting   Cuff Size: Adult   Temp: 99 4 °F (37 4 °C)   TempSrc: Tympanic   Weight: 85 7 kg (189 lb)   Height: 5' 4\" (1 626 m)         Physical Exam  Constitutional   General appearance: Patient is seated and in no acute distress, well appearing and well nourished  Head and Face   Head and face: Normal     Eyes   Conjunctiva and lids: No erythema, swelling or discharge  Anicteric  Ears, Nose, Mouth, and Throat   Hearing: Normal     Neck: Supple, trachea midline  Pulmonary   Respiratory effort: No increased work of breathing or signs of respiratory distress      Cardiovascular s   Examination of extremities for edema and/or varicosities: Normal     Musculoskeletal   Gait and station: Normal   Skin   Skin and subcutaneous tissue: Warm, dry, and " intact  No visible jaundice, lesions or rashes  Psychiatric   Judgment and insight: Normal  Recent and remote memory:  Normal  Mood and affect: Normal      Results  No visits with results within 1 Day(s) from this visit     Latest known visit with results is:   Admission on 02/20/2023, Discharged on 02/22/2023   Component Date Value   • Ventricular Rate 02/20/2023 61    • Atrial Rate 02/20/2023 61    • [place] Interval 02/20/2023 166    • QRSD Interval 02/20/2023 88    • QT Interval 02/20/2023 446    • QTC Interval 02/20/2023 448    • P Axis 02/20/2023 11    • QRS Axis 02/20/2023 -24    • T Wave Axis 02/20/2023 31    • POC Glucose 02/20/2023 93    • Sodium 02/20/2023 135    • Potassium 02/20/2023 4 7    • Chloride 02/20/2023 101    • CO2 02/20/2023 26    • ANION GAP 02/20/2023 8    • BUN 02/20/2023 22    • Creatinine 02/20/2023 0 66    • Glucose 02/20/2023 94    • Calcium 02/20/2023 9 4    • eGFR 02/20/2023 92    • WBC 02/20/2023 6 62    • RBC 02/20/2023 4 22    • Hemoglobin 02/20/2023 12 7    • Hematocrit 02/20/2023 37 9    • MCV 02/20/2023 90    • MCH 02/20/2023 30 1    • MCHC 02/20/2023 33 5    • RDW 02/20/2023 12 2    • Platelets 26/76/3941 233    • MPV 02/20/2023 9 0    • Protime 02/20/2023 14 6 (H)    • INR 02/20/2023 1 14    • PTT 02/20/2023 32    • hs TnI 0hr 02/20/2023 5    • SARS-CoV-2 02/20/2023 Negative    • INFLUENZA A PCR 02/20/2023 Negative    • INFLUENZA B PCR 02/20/2023 Negative    • RSV PCR 02/20/2023 Negative    • hs TnI 2hr 02/20/2023 6    • Delta 2hr hsTnI 02/20/2023 1    • Ventricular Rate 02/20/2023 60    • Atrial Rate 02/20/2023 60    • [place] Interval 02/20/2023 158    • QRSD Interval 02/20/2023 88    • QT Interval 02/20/2023 444    • QTC Interval 02/20/2023 444    • P Axis 02/20/2023 1    • QRS Axis 02/20/2023 -26    • T Wave Axis 02/20/2023 19    • hs TnI 4hr 02/20/2023 8    • Delta 4hr hsTnI 02/20/2023 3    • A4C EF 02/21/2023 60    • LVOT stroke volume 02/21/2023 65 35    • LVOT stroke volume index 02/21/2023 32 80    • LVIDd 02/21/2023 4 50    • LVIDS 02/21/2023 2 90    • IVSd 02/21/2023 1 20    • LVPWd 02/21/2023 1 10    • FS 02/21/2023 36    • MV E' Tissue Velocity Se* 02/21/2023 6    • MV E' Tissue Velocity La* 02/21/2023 8    • E wave deceleration time 02/21/2023 150    • MV Peak E Johan 02/21/2023 86    • MV Peak A Johan 02/21/2023 0 88    • AV LVOT peak gradient 02/21/2023 4    • LVOT peak VTI 02/21/2023 23 06    • LVOT peak johan 02/21/2023 0 94    • RVID d 02/21/2023 4 4    • Tricuspid annular plane * 02/21/2023 2 00    • LA size 02/21/2023 3 3    • LA length (A2C) 02/21/2023 5 60    • ANETA A4C 02/21/2023 22 2    • RAA A4C 02/21/2023 17    • LVOT diameter 02/21/2023 1 9    • Aortic valve peak veloci* 02/21/2023 1 47    • Ao VTI 02/21/2023 28 06    • AV mean gradient 02/21/2023 5    • LVOT mn grad 02/21/2023 2 0    • AV peak gradient 02/21/2023 9    • AV Deceleration Time 02/21/2023 1,812    • AV regurgitation pressur* 02/21/2023 526    • AV area by cont VTI 02/21/2023 2 3    • AV area peak johan 02/21/2023 1 8    • MV stenosis pressure 1/2* 02/21/2023 44    • MV valve area p 1/2 meth* 02/21/2023 5 00    • TR Peak Johan 02/21/2023 2 2    • Triscuspid Valve Regurgi* 02/21/2023 19 0    • Ao root 02/21/2023 3 70    • AV peak gradient 02/21/2023 87    • Aortic valve mean veloci* 02/21/2023 10 20    • Tricuspid valve peak reg* 02/21/2023 2 17    • Left ventricular stroke * 02/21/2023 60 00    • IVS 02/21/2023 1 2    • LEFT VENTRICLE SYSTOLIC * 01/31/2367 32    • LV DIASTOLIC VOLUME (MOD* 72/86/7349 91    • Left Atrium Area-systoli* 02/21/2023 22 9    • Left Atrium Area-systoli* 02/21/2023 23 4    • LVSV, 2D 02/21/2023 60    • LVOT area 02/21/2023 2 83    • DVI 02/21/2023 0 64    • AV valve area 02/21/2023 2 33    • LV EF 02/21/2023 60    • Est  RA pres 02/21/2023 3 0    • Right Ventricular Peak S* 02/21/2023 22 00    • Ventricular Rate 02/20/2023 56    • Atrial Rate 02/20/2023 56    • AR Interval 02/20/2023 178 • QRSD Interval 02/20/2023 86    • QT Interval 02/20/2023 450    • QTC Interval 02/20/2023 434    • P Axis 02/20/2023 15    • QRS Axis 02/20/2023 -23    • T Wave Axis 02/20/2023 26    • Cholesterol 02/21/2023 249 (H)    • Triglycerides 02/21/2023 322 (H)    • HDL, Direct 02/21/2023 46 (L)    • LDL Calculated 02/21/2023 139 (H)    • Hemoglobin A1C 02/21/2023 5 0    • EAG 02/21/2023 97        Radiology Results  No results found  Orders  No orders of the defined types were placed in this encounter

## 2023-03-28 ENCOUNTER — OFFICE VISIT (OUTPATIENT)
Dept: SLEEP CENTER | Facility: CLINIC | Age: 63
End: 2023-03-28

## 2023-03-28 VITALS
DIASTOLIC BLOOD PRESSURE: 68 MMHG | BODY MASS INDEX: 32.44 KG/M2 | WEIGHT: 190 LBS | HEART RATE: 57 BPM | SYSTOLIC BLOOD PRESSURE: 131 MMHG | HEIGHT: 64 IN

## 2023-03-28 DIAGNOSIS — R40.0 DAYTIME SLEEPINESS: ICD-10-CM

## 2023-03-28 DIAGNOSIS — I48.0 PAROXYSMAL ATRIAL FIBRILLATION (HCC): ICD-10-CM

## 2023-03-28 DIAGNOSIS — R53.82 CHRONIC FATIGUE: ICD-10-CM

## 2023-03-28 DIAGNOSIS — Z86.73 HISTORY OF CVA (CEREBROVASCULAR ACCIDENT): ICD-10-CM

## 2023-03-28 DIAGNOSIS — I65.22 STENOSIS OF LEFT CAROTID ARTERY: ICD-10-CM

## 2023-03-28 DIAGNOSIS — R63.4 WEIGHT LOSS: ICD-10-CM

## 2023-03-28 DIAGNOSIS — G47.33 OSA (OBSTRUCTIVE SLEEP APNEA): Primary | ICD-10-CM

## 2023-03-28 DIAGNOSIS — R06.09 DOE (DYSPNEA ON EXERTION): ICD-10-CM

## 2023-03-28 DIAGNOSIS — I11.0 HYPERTENSIVE HEART DISEASE WITH CONGESTIVE HEART FAILURE, UNSPECIFIED HEART FAILURE TYPE (HCC): ICD-10-CM

## 2023-03-28 DIAGNOSIS — J30.1 SEASONAL ALLERGIC RHINITIS DUE TO POLLEN: ICD-10-CM

## 2023-03-28 DIAGNOSIS — K21.9 GASTROESOPHAGEAL REFLUX DISEASE WITHOUT ESOPHAGITIS: ICD-10-CM

## 2023-03-28 DIAGNOSIS — E66.9 OBESITY (BMI 30-39.9): ICD-10-CM

## 2023-03-28 DIAGNOSIS — G47.09 OTHER INSOMNIA: ICD-10-CM

## 2023-03-28 DIAGNOSIS — J45.909 ASTHMA DUE TO SEASONAL ALLERGIES: ICD-10-CM

## 2023-03-28 DIAGNOSIS — F41.9 ANXIETY: ICD-10-CM

## 2023-03-28 DIAGNOSIS — G25.81 RLS (RESTLESS LEGS SYNDROME): ICD-10-CM

## 2023-03-28 RX ORDER — BUTALBITAL, ACETAMINOPHEN AND CAFFEINE 50; 325; 40 MG/1; MG/1; MG/1
1 TABLET ORAL EVERY 6 HOURS PRN
Qty: 90 TABLET | Refills: 0 | Status: SHIPPED | OUTPATIENT
Start: 2023-03-28

## 2023-03-28 NOTE — PROGRESS NOTES
Follow-Up Note - Sleep Center   Darcy Vick  70 y o  female  HYB:[birth year]  ZCA:[id number]  DOS:3/28/2023    CC: I saw this patient for follow-up in clinic today for Sleep disordered breathing, Coexisting Sleep and Medical Problems  She got a ResMed machine but is not using it  Home sleep testing in December of 2020 demonstrated: DENZEL (respiratory event index of) 14 /hour  The oxygen desaturation index was 15 5 per hour  Minimum oxygen saturation 67%  and 12 9% of total sleep time was spent with saturations less than 90%  The snore index was 39 1%  Auto titrating Pap was initiated  Retitration study in 2021 demonstrated sleep disordered breathing was successfully remediated with PAP at 8 cm H2O  She was observed during stage REM but not while supine There was severe periodic limb movements of sleep and she had difficulty maintaining sleep  Sleep efficiency reduced at 63 8%  PFSH, Problem List, Medications & Allergies were reviewed in EMR  Interval changes: Recent attempt at carotid endarterectomy but was unsuccessful; had a CVA  She  has a past medical history of A-fib ([address] ) (03/2020), Allergic, Anxiety, Arthritis, Asthma, Back pain, Bruises easily, Chronic pain disorder, Colon polyp, Dental bridge present, Depression, Eczema, GERD (gastroesophageal reflux disease), Heart murmur, History of blood clots, History of pneumonia, Hives, Hyperlipidemia, Hypertension, Infertility, female, Interstitial cystitis, Migraine, Motion sickness, Palpitations, PONV (postoperative nausea and vomiting), Psychiatric disorder, TIA (transient ischemic attack) (09/2022), Urinary incontinence, and Wears glasses      She has a current medication list which includes the following prescription(s): apixaban, vitamin c, aspirin, biotin w/ vitamins c & e, butalbital-acetaminophen-caffeine, chlordiazepoxide-clidinium, cholecalciferol, clotrimazole-betamethasone, diltiazem, docusate sodium, evolocumab, ezetimibe, "famotidine, fexofenadine, fluticasone-vilanterol, furosemide, galcanezumab-gnlm, hydroxyzine hcl, lorazepam, metoprolol succinate, montelukast, multiple vitamin, omeprazole, ondansetron, phenazopyridine, probiotic product, spironolactone, tramadol, turmeric, zinc, and naloxone, and the following Facility-Administered Medications: cyanocobalamin  PHYSIOLOGICAL DATA REVIEW AND INTERPRETATION: Discontinued use of PAP several months ago ;  Compliance is non- compliant; Pressure setting is:adequate at 12 cm; Patient has not been using non FDA approved devices to sanitize the machine  SUBJECTIVE: With respect to use of PAP, Bernardino Vergara reports is benefiting from use and is satisfied    Other issues: None reported; restless leg symptoms occur infrequently  Sleep Routine: Bernardino Vergara reports getting 7-8 hrs sleep; she has difficulty initiating sleep, but not maintaining sleep   She arises spontaneously and is not always refreshed  Bernardino Vergara reports excessive daytime sleepiness, [feels like napping & is dozing off unintentionally ] She rated [herself] at Total score: 5 /24 on the Arjay Sleepiness Scale  Habits:[ reports that she quit smoking about 4 years ago  Her smoking use included cigarettes  She has a 5 00 pack-year smoking history  She has never been exposed to tobacco smoke  She has never used smokeless tobacco ], [ reports no history of alcohol use ], [ reports no history of drug use ], Caffeine use:none; Exercise routine: sometimes  ROS: Significant for significant weight loss following stenting of mesenteric artery that caused poor appetite and recently weight has been stable  She feels allergies and asthma control  She has acid reflux  He continues to have episodic atrial fibrillation  She uses Ativan for anxiety and Atarax as a sleep aid      EXAM: /68 (BP Location: Left arm, Patient Position: Sitting, Cuff Size: Large)   Pulse 57   Ht 5' 4\" (1 626 m)   Wt 86 2 kg (190 lb)   LMP  (LMP " Unknown)   BMI 32 61 kg/m²     Wt Readings from Last 3 Encounters:   03/28/23 86 2 kg (190 lb)   03/27/23 85 7 kg (189 lb)   03/20/23 86 6 kg (191 lb)      Patient is well groomed; well appearing  CNS: Alert, orientated, clear & coherent speech  Psych: cooperative and in no distress  Mental state: Appears normal   H&N: EOMI; NC/AT: No facial pressure marks, no rashes  Skin/Extrem: col & hydration normal; no edema  Resp: Respiratory effort is normal  Physical findings otherwise essentially unchanged from previous  IMPRESSION: Problem List Items & Comorbidities Addressed this Visit    1  CAMILLE (obstructive sleep apnea)  Ambulatory Referral to Sleep Medicine    Home Study      2  Other insomnia        3  RLS (restless legs syndrome)        4  Daytime sleepiness        5  Weight loss        6  Anxiety        7  Paroxysmal atrial fibrillation (HCC)        8  Stenosis of left carotid artery        9  History of CVA (cerebrovascular accident)        10  Hypertensive heart disease with congestive heart failure, unspecified heart failure type ([facility] [address] )        11  Seasonal allergic rhinitis due to pollen        12  Asthma due to seasonal allergies        13  WELLINGTON (dyspnea on exertion)        14  Chronic fatigue        15  Obesity (BMI 30-39 9)        16  Gastroesophageal reflux disease without esophagitis            PLAN:  1  I reviewed results of prior studies and old physiologic data with the patient  2  I discussed treatment options with risks and benefits  3  In view of significant weight reduction, I also advised on weight reduction  Repeat diagnostic study is warranted  She declined an in lab study but is willing to do a home sleep study  She understands the limitations  4  She is willing to reinitiate CPAP if necessary using a full facemask  5  Pressure setting: adjust based on findings of her study  6  She continues to lose weight because has been instructed by PCP to be wary of her diet    7  Multi "component Cognitive behavioral therapy for Insomnia undertaken - Sleep Restriction, Stimulus control, Relaxation techniques and Sleep hygiene were discussed  8  No medication is needed for the restless leg symptoms at this time  9  Follow-up is advised in after the study and initiating CPAP if necessary to monitor progress, compliance and to adjust therapy  Thank you for allowing me to participate in the care of this patient  Sincerely,     Authenticated electronically on 07/80/62   Board Certified Specialist     Portions of the record may have been created with voice recognition software  Occasional wrong word or \"sound a like\" substitutions may have occurred due to the inherent limitations of voice recognition software  There may also be notations and random deletions of words or characters from malfunctioning software  Read the chart carefully and recognize, using context, where substitutions/deletions have occurred      "

## 2023-03-28 NOTE — PATIENT INSTRUCTIONS
Nursing Support:  When: Monday through Friday 7A-5PM except holidays  Where: Our direct line is [phone]  If you are having a true emergency please call 911  In the event that the line is busy or it is after hours please leave a voice message and we will return your call  Please speak clearly, leaving your full name, birth date, best number to reach you and the reason for your call  Medication refills: We will need the name of the medication, the dosage, the ordering provider, whether you get a 30 or 90 day refill, and the pharmacy name and address  Medications will be ordered by the provider only  Nurses cannot call in prescriptions  Please allow 7 days for medication refills  Physician requested updates: If your provider requested that you call with an update after starting medication, please be ready to provide us the medication and dosage, what time you take your medication, the time you attempt to fall asleep, time you fall asleep, when you wake up, and what time you get out of bed  Sleep Study Results: We will contact you with sleep study results and/or next steps after the physician has reviewed your testing

## 2023-03-29 ENCOUNTER — TELEPHONE (OUTPATIENT)
Dept: NEUROLOGY | Facility: CLINIC | Age: 63
End: 2023-03-29

## 2023-03-30 ENCOUNTER — OFFICE VISIT (OUTPATIENT)
Dept: SPEECH THERAPY | Facility: REHABILITATION | Age: 63
End: 2023-03-30

## 2023-03-30 DIAGNOSIS — R13.12 DYSPHAGIA, OROPHARYNGEAL PHASE: ICD-10-CM

## 2023-03-30 DIAGNOSIS — I63.9 ACUTE CVA (CEREBROVASCULAR ACCIDENT) (HCC): Primary | ICD-10-CM

## 2023-03-30 DIAGNOSIS — G51.0 PARTIAL FACIAL NERVE PALSY: ICD-10-CM

## 2023-03-30 NOTE — PROGRESS NOTES
Daily Speech Treatment Note    Today's date: 3/30/2023  Patient’s name: Todd Sage  : 1960  MRN: [id number]  Safety measures: ALLERGIES Multiple, dysphagia related--- ARTIFICIAL SWEETENERS, PRESERVATIVES  Referring provider: Alfie Flor DO    Encounter Diagnosis     ICD-10-CM    1  Acute CVA (cerebrovascular accident) ([address] )  I63 9 FL barium swallow video w speech      2  Partial facial nerve palsy  G51 0 FL barium swallow video w speech      3  Dysphagia, oropharyngeal phase  R13 12 FL barium swallow video w speech              Visit tracking:  -Referring provider: Epic  -Billing guidelines: CMS  -Visit #2  -Insurance: Medicare ([email or web address] secondary)  -RE due 2023     Subjective/Behavioral:  -Pain level 2-3/10 this date, reports significant pain following completion of OM exercises the other day, reports discontinued within home environment  SLP encouraged to discontinue exercises causing pain  Patient verbalized understanding      -recommendations for VFSS to further assess swallow function  Orders requested this date  Objective/Assessment:  -Patient education on plan of therapy, patient verbalized understanding  Short-term goals:    1  Patient will utilize over-articulation 80% of the time while orally reading sentence/paragraph length material to facilitate increased speech intelligibility, to be achieved in 4-6 weeks  2  Speech clarity during 1:1 conversation will improve over 4 weeks using the trained compensatory strategies, to be achieved in 4-6 weeks  Training in use of IOPI for home practice to improve labial strength, patient verbalized and return demonstrated understanding  Patient reports no significant increase in pain following completion of session this date 3-4/10  SLP encouraged to discontinue if pain increases or exercises cause pain and to discuss with MD  Patient verbalized understanding     3  Patient will improve HLE/pharyngeal/OM strength and timing with use of HLE exercises and additional appropriate interventions in order to facilitate improved toleration of liquids and solids demonstrating 90% free of overt s/s of aspiration/penetration, to be achieved in 4-6 weeks  Patient trained in HLE/pharyngeal strengthening exercises this date (effortful swallows and Akanksha Maneuvers) in order to facilitate improved toleration of liquids/solids, reducing risk of aspiration and related complications  Patient required min verbal cues this date for improved accuracy  Patient was able to return demonstrate understanding, verbalized understanding for completion within home environment  Patient reporting tightness in anterior portion of neck, not near surgical sight possibly impacting swallow function, trial of manual therapy interventions completed this date  Due to patient's current symptoms he/she may benefit from Myofascial Release treatment (MFR) for voice and swallowing  This is a manual therapy technique through myofascial release (stimulation/pressure/stretch) to address patient's symptoms, and will be completed unless otherwise contraindicated  In order to facilitate a reduction in accessory muscle tension possibly attributing to dysphonia, manual therapy interventions provided this date  Following patient's verbal consent to treat, manual therapy technique through myofascial release was applied to patient's hyoid region  Region(s) 3/30/23         Lower Thoracic & Respiratory Diaphragm             Upper Thoracic & Respiratory Diaphragm             Hyoid    2 mins         Anterior Cervical            Tongue            Jaw & Mouth            Facial            Palpatory examination revealed moderate mechanosensitivity (i e , sensitivity to mechanical pressure/stretch) through the hyoid region   A mild palpatory pressure was suspected to show a mild area of apparent thickening that, with stimulation/pressure/stretch, reproduced patient's complaints/correlated to her primary issue of dysphagia  Patient verbalized that technique/pressure, through triggering her symptoms, was helpful  Will trial in sessions prior to recommending for home environment  4  Patient will demonstrate consistent use of compensatory strategies (e g , upright positioning, small bites/sips, slow rate, alternation of consistencies, multiple swallows, effortful swallow, chin tuck, head turn, etc ) with 90% accuracy to reduce risk of  penetration/aspiration during consumption of liquids/solids, to be achieved in 4-6 weeks  Plan:  -Patient was provided with home exercises/activities to target goals in plan of care at the end of today's session   -Continue with current plan of care

## 2023-03-31 ENCOUNTER — APPOINTMENT (OUTPATIENT)
Dept: PHYSICAL THERAPY | Facility: REHABILITATION | Age: 63
End: 2023-03-31

## 2023-03-31 ENCOUNTER — NURSE TRIAGE (OUTPATIENT)
Dept: OTHER | Facility: OTHER | Age: 63
End: 2023-03-31

## 2023-03-31 ENCOUNTER — TELEMEDICINE (OUTPATIENT)
Dept: UROLOGY | Facility: CLINIC | Age: 63
End: 2023-03-31

## 2023-03-31 DIAGNOSIS — M54.2 NECK PAIN: ICD-10-CM

## 2023-03-31 DIAGNOSIS — N30.10 INTERSTITIAL CYSTITIS: Primary | ICD-10-CM

## 2023-03-31 DIAGNOSIS — R39.82 CHRONIC BLADDER PAIN: ICD-10-CM

## 2023-03-31 DIAGNOSIS — R11.0 NAUSEA: Primary | ICD-10-CM

## 2023-03-31 DIAGNOSIS — N39.0 RECURRENT UTI: ICD-10-CM

## 2023-03-31 RX ORDER — TRAMADOL HYDROCHLORIDE 50 MG/1
100 TABLET ORAL EVERY 8 HOURS PRN
Qty: 60 TABLET | Refills: 3 | Status: SHIPPED | OUTPATIENT
Start: 2023-03-31

## 2023-03-31 RX ORDER — PROMETHAZINE HYDROCHLORIDE 12.5 MG/1
12.5 TABLET ORAL EVERY 6 HOURS PRN
Qty: 30 TABLET | Refills: 2 | Status: SHIPPED | OUTPATIENT
Start: 2023-03-31

## 2023-03-31 NOTE — TELEPHONE ENCOUNTER
"Patient reports the increase of Zofran did not help her symptoms and she was advised to call back if it did not work so different medication could be offered  Patient would like a call back to advise  Reason for Disposition  • Caller wants to use a complementary or alternative medicine    Answer Assessment - Initial Assessment Questions  1  NAME of MEDICATION: \"What medicine are you calling about? \"      Zofran  2  QUESTION: Guillermo Kamara is your question? \" (e g , medication refill, side effect)      Not working   3  PRESCRIBING HCP: \"Who prescribed it? \" Reason: if prescribed by specialist, call should be referred to that group  [facility]   4  SYMPTOMS: \"Do you have any symptoms? \"      Nausea/vomiting   5  SEVERITY: If symptoms are present, ask \"Are they mild, moderate or severe? \"     Moderate/severe   6  PREGNANCY:  \"Is there any chance that you are pregnant? \" \"When was your last menstrual period? \"      N/A    Protocols used: MEDICATION QUESTION CALL-ADULT-[place]      "

## 2023-03-31 NOTE — PROGRESS NOTES
Virtual Brief Visit    Patient is located in the following state in which I hold an active license [place]      Assessment/Plan:  Chronic urethral and bladder pain, lower abdominal pain, certainly sounds like a form of interstitial cystitis with a history of Hunner's ulcers, and has been extensively evaluated  I think this is now overlapping with symptomatic recurrent urinary tract infections  We have been resisting treating her with antibiotics in conjunction with infectious disease because some of it we thought was the interstitial cystitis and asymptomatic bacteriuria  However she does have a Proteus Mirabella's infection currently and I think these are now symptomatic so they need to be treated  She is currently on Levaquin 750 mg daily and she will continue this and complete the course  I am going to start her on methenamine 1000 mg p o  twice daily and she will take this with 1000 mg of vitamin C per day  90-day course was sent to her pharmacy  Unfortunately it sounds like she is unable to tolerate estrogen cream   The yellow clots she passed may have been old clot which was broken down fibrin stained yellow by urine  She had a normal CAT scan with no suggestion of any form of colovesical fistula in September 2021  If she continues to have recurrent infections, we may need to revisit the estrogen cream and see if she can tolerate another formulation and consider repeating a CAT scan  As said before I wish to avoid any urethral manipulation because this seems to exacerbate things  Schedule her for 6-month visit to see how things are going and hopefully the Methenamine will cut down on the infections  Continue with the hydroxyzine and Prelief  she can take pyridium 1-2 per day- she knows to limit its use  I sent in a new Rx because of the severity of the pain, I will prescribe her Tramadol upon her requst     Has been going to PT for PFPT   C/O right sided bladder pain, concerned it may be recurrentHunner's ulcer  Has intermittent stream  Has Afib being sorted out  Takes Pyridium BID- I do not like this, but seems to be only thing that helps  Has Carotid stenosis- first CEA was aborted so she has to go back for repeat procedure  Plan- continue with PFPT, Hydroxyzine  Have her other issues taken care of  F/U with me 6/28 as scheduled  Problem List Items Addressed This Visit    None      Recent Visits  No visits were found meeting these conditions  Showing recent visits within past 7 days and meeting all other requirements  Today's Visits  Date Type Provider Dept   03/31/23 Telemedicine Rekha Salinas MD [facility]   Showing today's visits and meeting all other requirements  Future Appointments  No visits were found meeting these conditions    Showing future appointments within next 150 days and meeting all other requirements         Visit Time    Visit Start Time: 3200  Visit Stop Time: 1400  Total Visit Duration: 13 minutes

## 2023-04-03 ENCOUNTER — APPOINTMENT (OUTPATIENT)
Dept: SPEECH THERAPY | Facility: REHABILITATION | Age: 63
End: 2023-04-03

## 2023-04-03 DIAGNOSIS — F41.9 ANXIETY: ICD-10-CM

## 2023-04-04 RX ORDER — LORAZEPAM 1 MG/1
1 TABLET ORAL EVERY 6 HOURS PRN
Qty: 90 TABLET | Refills: 0 | Status: SHIPPED | OUTPATIENT
Start: 2023-04-04

## 2023-04-05 ENCOUNTER — APPOINTMENT (OUTPATIENT)
Dept: PHYSICAL THERAPY | Facility: REHABILITATION | Age: 63
End: 2023-04-05

## 2023-04-05 DIAGNOSIS — E78.49 OTHER HYPERLIPIDEMIA: ICD-10-CM

## 2023-04-05 RX ORDER — EVOLOCUMAB 140 MG/ML
INJECTION, SOLUTION SUBCUTANEOUS
Qty: 2 ML | Refills: 11 | Status: SHIPPED | OUTPATIENT
Start: 2023-04-05

## 2023-04-13 ENCOUNTER — APPOINTMENT (OUTPATIENT)
Dept: PHYSICAL THERAPY | Facility: REHABILITATION | Age: 63
End: 2023-04-13

## 2023-04-13 ENCOUNTER — APPOINTMENT (OUTPATIENT)
Dept: SPEECH THERAPY | Facility: REHABILITATION | Age: 63
End: 2023-04-13

## 2023-04-17 PROBLEM — R20.2 PARESTHESIA: Status: ACTIVE | Noted: 2023-04-17

## 2023-04-17 PROBLEM — I63.9 ACUTE CVA (CEREBROVASCULAR ACCIDENT) (HCC): Status: RESOLVED | Noted: 2022-09-01 | Resolved: 2023-04-17

## 2023-04-24 ENCOUNTER — APPOINTMENT (OUTPATIENT)
Dept: SPEECH THERAPY | Facility: REHABILITATION | Age: 63
End: 2023-04-24

## 2023-04-24 ENCOUNTER — TRANSCRIBE ORDERS (OUTPATIENT)
Dept: GASTROENTEROLOGY | Facility: CLINIC | Age: 63
End: 2023-04-24

## 2023-04-25 ENCOUNTER — APPOINTMENT (OUTPATIENT)
Dept: PHYSICAL THERAPY | Facility: REHABILITATION | Age: 63
End: 2023-04-25

## 2023-04-26 ENCOUNTER — OFFICE VISIT (OUTPATIENT)
Dept: PHYSICAL THERAPY | Facility: REHABILITATION | Age: 63
End: 2023-04-26

## 2023-04-26 DIAGNOSIS — R39.89 BLADDER PAIN: Primary | ICD-10-CM

## 2023-04-26 DIAGNOSIS — R53.1 LEFT-SIDED WEAKNESS: ICD-10-CM

## 2023-04-26 DIAGNOSIS — I63.9 ACUTE CVA (CEREBROVASCULAR ACCIDENT) (HCC): Primary | ICD-10-CM

## 2023-04-26 DIAGNOSIS — K59.09 OTHER CONSTIPATION: ICD-10-CM

## 2023-04-26 DIAGNOSIS — M62.89 PELVIC FLOOR DYSFUNCTION: ICD-10-CM

## 2023-04-26 DIAGNOSIS — R26.89 BALANCE PROBLEM: ICD-10-CM

## 2023-04-26 NOTE — PROGRESS NOTES
"Daily Note        Name: Nathan Chicas  Date: 23  : 1960  Referring Provider: Rebekah Marquez DO  AUTHORIZATION:   Insurance: Payor: Tamika Mckoy [employer] REP / Plan: Alina Kaur [facility] CARE COMM [address] / Product Type: Medicare HMO /   3 of 16 visits through , PN due       SUBJECTIVE: Has a migraine  Is nauseous  Not a good day  Thinks she did too much last visit, low back was bothering her  Wants her BP taken, it feels off  HPI: Nathan Chicas is a 70 y o  female referred to outpatient physical therapy for the following diagnosis   Encounter Diagnoses   Name Primary? • Acute CVA (cerebrovascular accident) ([address] ) Yes   • Left-sided weakness    • Balance problem          Patient-Specific Functional Scale   Task is scored 0 (unable to perform activity) to 10 (ability to perform activity independently)  Activity     1  Carry the laundry up the steps wihtout difficulty  3    2  Taking the garbage out  3    3  Feel comfortable walking outside without loss of balance  5         Patient goals: To get stronger and relieve some of the pain  OBJECTIVE:  BP: 132/75  HR: 52bpm    ASSESSMENT:  BP monitored during session and WNL  Headache reduced after TM training  Patient requested resting periods between activities, wants to be able to do hospital visit post intervention  She is demonstrating increased proximal strength and improved balance today  She will continue to benefit froom skilled PT         Precautions Precautions: a-fib, anxiety, HTN, COPD, closely monitor vital signs     Specialty Treatment Diary     Home exercise program        Walking/endurance TM SOLO BUE x10 min 1 5 mph   400'  SPO2 98%    Sit to Stand  2 UE push off 15 sec  0 UE assist 22 sec Biodex  1 0 mph 1 min  1 5 mph 2 min  2 0 mph 7 min  10 min    Rest  2 0 mph x 5 min     Static and dynamic balance SOLO Stepups onto foam 6\" 2x1min fwd  125/66 HR 69      10\" " "racheal 4 laps forwards/sideways    Added Foam beam  4 laps each way    Eyes closed  30' x 3 laps  12 steps- 10 steps    Resting perids required TUG 3  Solostep 80' laps x 3  HT  HN  Solostep 50' laps x 3  Backwards  EC     Solostep    Compliant surface management ( object under for increased challenge)   circles x 4  Sideways    100' x 2  HT  HN  180 turns 2# weights  EC   Hurdles 10\"  4 laps         fwd         Sideways    Foam beam x 3 laps ea  Forwards  sideways       Strengthening  Step ups 6\" plus foam mat  1 min each forwards  1 5 min sidestepping   nv   Stretching              PLAN OF CARE:  Patient will benefit from physical therapy 1-2 times per week for 4 weeks  Neuromuscular re-education, therapeutic exercises, and therapeutic activities as outlined in grids      Germain Galdamez, PT  4/26/2023  "

## 2023-04-26 NOTE — PROGRESS NOTES
"Daily Note     Today's date: 2023  Patient name: Tricia Buchanan  : 1960  MRN: [id number]  Referring provider: Benita Palmer, *  Dx:   Encounter Diagnosis     ICD-10-CM    1  Bladder pain  R39 89       2  Other constipation  K59 09       3  Pelvic floor dysfunction  M62 89           Start Time:   Stop Time: 1233  Total time in clinic (min): 57 minutes    Subjective: Pt came to neuro PT with back pain and a HA and feels better after neuro PT  Pt is tired from doing her PT exercises this morning  Pt reports she is having more Type 3 and 4 stool  She is using proper bowel mechanics and is having less difficulty with passing stool now  Pt had 2 days she used pyridium because \"she could not pee\" but she felt constipated during that time  Pt reports no urinary leakage  She is having some R LQ abdominal stabbing pain, intermittent  Right now she reports 4/10 \"thumping pain  \" Has been present 3-4 weeks but only 1-2 times a week  She thinks it may be diverticulitis  Pt will discuss with GI doctor  She reports she is able to delay her urgency about 20 mins  Pt reports 80-90% improvement  Objective: See treatment diary below  TTP R low back/piriformis and b/l OI and post pelvic floor      Assessment: Pt experienced 50% reduction in her pain in R lower quadrant with focus on breath and LE/lumbar stretches  Pt will still address this pain with her physician but can use her breath control to manage symptoms  Progressed proximal hip strength this visit with focus on coordination with TAC/PFM and breath control  Pt will benefit from re-eval NV to determine progress towards LTG's and continued need for skilled PT  Tolerated treatment well  Patient would benefit from continued PT      Plan: Continue per plan of care  Progress note during next visit    Pt to ice 10-15 mins 2-3x/day with towel layer between ice and body to assist in decreasing pain in R low back/piriformis        Access Code: " [email or web address]  URL: [email or web address]  Date: 04/26/2023  Prepared by: Marguerite Vazquez    Program Notes  Tighten your belly and pelvic floor before each strengthening repetition, relax after each strengthening repetition,  breathe easily throughout      Exercises  - Supine Diaphragmatic Breathing  - 3 x daily - 5 reps  - Seated Child's Pose with Table  - 2-3 x daily - 1-3 mins hold  - Seated Piriformis Stretch  - 2 x daily - 3 reps - 20 secs hold  - Supine Lower Trunk Rotation  - 2 x daily - 10 reps - 5 hold  - Supine Piriformis Stretch with Foot on Ground  - 2 x daily - 2 reps - 30 secs hold  - Supported Butterfly Stretch with Pelvic Floor Relaxation  - 2 x daily - 2 reps - 30 secs hold  - Small Range Straight Leg Raise  - 3-4 x weekly - 2 sets - 10 reps  - Sidelying Hip Abduction  - 3-4 x weekly - 2 sets - 10 reps  - Supine Bridge  - 3-4 x weekly - 2 sets - 10 reps - 2-3 secs hold  - Clamshell  - 3-4 x weekly - 2 sets - 10 reps - 5-10 secs hold             Precautions: a-fib, anxiety, HTN  asthma  Impairments/functional limitations: urinary urgency, frequency, leakage, constipation, pelvic pain   Interstitial cystitits  Daily Treatment Diary     Manuals 3/17 3/24 4/12  4/19  4/26       External assess prn               Internal assess prn               Objective assessment nv performed             OI/post pelvic floor STM           10 mins       R low back/piriformis STM     5 mins                Neuro Re-Ed                 TAC        review         PF   review    review         TAC+PF        review with breath review, with breath        TAC+PF with march        nv Np, d/c       TAC+PF with alt knee fall out        x10 ea  np, d/c       TAC+PF with ball squeeze        5\"x10 ea  np, d/c       TAC+PF with SLR          x5 ea       TAC+PF with s/l ABD          x5 ea       TAC+PF with bridge          x8       TAC+PF with clamshells          5\"x5 ea                         360 deg breathing   nv Review    with " "exercises       Ther Ex                 Bike          Supine piri stretch   nv 30\"x2ea  30\"x2 ea  30\"x2 ea       LTR   nv x10  x10 ea  x10 ea       Seated piri stretch with breath   nv 30\"x1 ea  30\"x2 ea  30\"x2 ea       Supine butterfly stretch   nv 30\"x1 ea  30\"x1 ea  30\"x2 ea        Seated lucho pose with 360 deg breathing   nv Review, 2 mins  Review, 2 mins   1 mins                                                              Ther Activity                 Bladder Diary  Review, issued  review review           Urge delay:QF   review review           Urge delay: HR                 Freeze, breathe, squeeze    review review  review         Urinary hesitancy- starting the stream of urine     review  review         The knack                 Decreasing pad usage review               Bowel mechanics/posture review Review  review           Healthy bladder habits  Review, sipping regularly, inc water intake  review Review, water intake                                                Gait Training                                                     Modalities                                                                  "

## 2023-04-27 ENCOUNTER — APPOINTMENT (OUTPATIENT)
Dept: PHYSICAL THERAPY | Facility: REHABILITATION | Age: 63
End: 2023-04-27

## 2023-04-28 ENCOUNTER — HOSPITAL ENCOUNTER (OUTPATIENT)
Dept: RADIOLOGY | Facility: HOSPITAL | Age: 63
Discharge: HOME | End: 2023-04-28

## 2023-04-28 ENCOUNTER — APPOINTMENT (OUTPATIENT)
Dept: PHYSICAL THERAPY | Facility: REHABILITATION | Age: 63
End: 2023-04-28

## 2023-04-28 DIAGNOSIS — R20.2 PARESTHESIA: ICD-10-CM

## 2023-05-01 ENCOUNTER — APPOINTMENT (OUTPATIENT)
Dept: SPEECH THERAPY | Facility: REHABILITATION | Age: 63
End: 2023-05-01
Payer: COMMERCIAL

## 2023-05-01 DIAGNOSIS — F41.9 ANXIETY: ICD-10-CM

## 2023-05-01 RX ORDER — LORAZEPAM 1 MG/1
1 TABLET ORAL EVERY 6 HOURS PRN
Qty: 90 TABLET | Refills: 0 | Status: SHIPPED | OUTPATIENT
Start: 2023-05-01

## 2023-05-02 ENCOUNTER — OFFICE VISIT (OUTPATIENT)
Dept: CARDIOLOGY CLINIC | Facility: CLINIC | Age: 63
End: 2023-05-02

## 2023-05-02 VITALS
SYSTOLIC BLOOD PRESSURE: 124 MMHG | HEIGHT: 64 IN | BODY MASS INDEX: 32.61 KG/M2 | DIASTOLIC BLOOD PRESSURE: 80 MMHG | HEART RATE: 98 BPM | WEIGHT: 191 LBS

## 2023-05-02 DIAGNOSIS — I48.0 PAROXYSMAL ATRIAL FIBRILLATION (HCC): Primary | ICD-10-CM

## 2023-05-02 DIAGNOSIS — R00.2 PALPITATIONS: ICD-10-CM

## 2023-05-02 RX ORDER — DILTIAZEM HYDROCHLORIDE 240 MG/1
240 CAPSULE, EXTENDED RELEASE ORAL DAILY
Qty: 90 CAPSULE | Refills: 3 | Status: SHIPPED | OUTPATIENT
Start: 2023-05-02

## 2023-05-02 NOTE — PROGRESS NOTES
"      Cardiology             Juan Watts  1960  [id number]                 Assessment/Plan:     Paroxysmal atrial fibrillation, diagnosed on Holter monitor 03/2020  Hypertension  Multiple drug intolerances and possible allergies  Probable heterozygous familial hypercholesterolemia, on Repatha  Obesity   Sleep apnea, compliant with CPAP therapy   Lower extremity edema, on furosemide, resolved              Although heart rate improved, remains suboptimal, 98 bpm at rest   Will increase diltiazem further to 240 mg p o  daily  She will call me if she experiences any adverse drug reactions  She has refused another consultation with electrophysiology as she did not want an ablation procedure and was reluctant to try any other medications due to her multiple side effects and intolerances to meds  She may be agreeable to meet with them again if she has persistent symptoms despite adequate rate control consider rhythm control strategy  Continue metoprolol  Blood pressure controlled today, which historically has been difficult to control as she has reported intolerances to multiple medications    HCTZ has caused \"throat closing,\" atorvastatin has caused joint pains, valsartan has caused reported angioedema, hydralazine has caused \"kidney pain,\" nifedipine caused headache and nausea, verapamil cause increase in palpitations                    Follow-up with me in 1 month              Interval History:      This is a 68-year-old female diagnosed with paroxysmal atrial fibrillation on Holter monitoring 3/020   That time echocardiogram had revealed mild-to-moderate aortic regurgitation with normal left ventricular systolic function   Nuclear stress test June 2020 was unremarkable with normal perfusion      She has hypertension, and has multiple medications listed as allergies including beta-blockers, although she has been taking metoprolol for quite some time   Although on her allergy list HCTZ states throat " "closing,\" she states that actually cause some ankle swelling along with amlodipine   Atorvastatin caused joint pain, although it is listed in her allergies also as throat closing    She seems to have had a true allergic reaction to hydralazine and valsartan   She states valsartan caused angioedema, and she does not remember what happened with hydralazine, although states she thinks it may have been ankle swelling      She was last seen by Dr Lynn Abraham 01/28/2021 at which time she was maintained on metoprolol succinate and spironolactone for hypertension   At 1 point she was asked to take diltiazem as needed for palpitations  Giuseppe Wells has been maintained on Eliquis anticoagulation        She underwent a Zio monitor 03/2021 at [facility](should be scanned into media section, personally reviewed rhythm strips) revealing normal sinus rhythm with 8 runs of SVT, longest lasting 17 beats   There were 18 patient triggers, most of which correlated with PACs, short atrial runs, and PVCs  Bogdan Goel was no evidence of atrial fibrillation      Nuclear stress test 06/2020 was unremarkable     In August 2021 she was hospitalized at [facility] for epigastric and left upper quadrant pain, admitted for superior mesenteric artery thrombosis   She received heparin drip, and underwent SMA stenting with IR on 08/19/2021   She was initiated on Plavix, and continued on Eliquis      During her prior visit 10/15/2021 she was initiated on diltiazem which she later stopped due to headache and nausea   Subsequent nifedipine was tried which caused headache and nausea as well which she stopped on her own   Verapamil caused increase in palpitations      She went to [facility] ER due to urinary retention and was reported to be in atrial fibrillation  ZIO monitor 10/2022 revealed episodes of paroxysmal atrial fibrillation which correlated with her triggered symptoms    Echocardiogram 12/16/2022 demonstrated normal left ejection fraction of 55% " "with mild mitral and aortic regurgitation  Nuclear stress test 1/17/2023 demonstrated no evidence of myocardial ischemia  She was seen by electrophysiology and was initiated on flecainide which she did not tolerate well due to side effects  She refused ablation, therefore was placed on diltiazem      On 1/31/2023 she underwent exploration of left carotid artery for CEA, and this was aborted, as the left carotid bifurcation was high and deep in the neck with the hypoglossal nerve overriding the bifurcation  She went back to the hospital 2/2023 with a left facial droop, 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, and she was discharged      During her prior visit 3/20/2023, diltiazem was increased from 120 mg to 180 mg daily in light of recurrent atrial fibrillation with RVR at 111 bpm   She refused another consultation with electrophysiology  She was unable to tolerate flecainide due to increased palpitations, therefore was reluctant to try any more medications and refused ablation as well  She presents today for follow-up  She has symptoms of fatigue  She denies significant dyspnea, chest discomfort, lower extremity edema, lightheadedness              Vitals:  Vitals:    05/02/23 1050   BP: 124/80   Pulse: 98   Weight: 86 6 kg (191 lb)   Height: 5' 4\" (1 626 m)         Past Medical History:   Diagnosis Date    [facility]    Allergic     Anxiety     Arthritis     Asthma     Back pain     and muscle pain    Bruises easily     Chronic pain disorder     Interstitial pain    Colon polyp     Dental bridge present     Depression     Eczema     GERD (gastroesophageal reflux disease)     Heart murmur     History of blood clots     per pt \"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\"    History of pneumonia     Hives     Hyperlipidemia     Hypertension     Infertility, female     Interstitial cystitis     Migraine     sees neurologist    Motion sickness  " "   Palpitations     PONV (postoperative nausea and vomiting)     Psychiatric disorder     TIA (transient ischemic attack) 2022    per pt --\"had MRI and 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\"    Urinary incontinence     wears Pads    Wears glasses      Social History     Socioeconomic History    Marital status:      Spouse name: Not on file    Number of children: 0    Years of education: Not on file    Highest education level: Not on file   Occupational History    Occupation: unemployed   Tobacco Use    Smoking status: Former     Packs/day: 0 50     Years: 10 00     Pack years: 5 00     Types: Cigarettes     Quit date:      Years since quittin 3     Passive exposure: Never    Smokeless tobacco: Never   Vaping Use    Vaping Use: Never used   Substance and Sexual Activity    Alcohol use: Never    Drug use: No    Sexual activity: Not on file     Comment: defer   Other Topics Concern    Not on file   Social History Narrative    Who lives in your home: Alone     What type of home do you live in: Apartment     Age of your home: 1952 built     How long have you been living there: 5 yrs     Type of heat: forced hot air     Type of fuel: electric     What type of jamin is in your bedroom: carpet jamin     Do you have the following in or near your home:    Air products: Humidifier in the bedroom/kitchen     Pests: none     Pets: none     Are pets allowed in bedroom: N/A     Open fields, wooded areas nearby: No     Basement: aqux-[id number]-workz-no mold     Exposure to second hand smoke: No    Central air     Habits:    Caffeine: Hot tea 1 cup daily- ice tea 1 cup in the afternoon    Chocolate: Occasionally      Social Determinants of Health     Financial Resource Strain: Not on file   Food Insecurity: No Food Insecurity    Worried About Running Out of Food in the Last Year: Never true    Donell of Food in the Last Year: Never true   Transportation Needs: No Transportation Needs "  Lack of Transportation (Medical): No    Lack of Transportation (Non-Medical): No   Physical Activity: Not on file   Stress: Not on file   Social Connections: Not on file   Intimate Partner Violence: Not on file   Housing Stability: Low Risk     Unable to Pay for Housing in the Last Year: No    Number of Places Lived in the Last Year: 1    Unstable Housing in the Last Year: No      Family History   Problem Relation Age of Onset    Lung cancer Mother 61    Brain cancer Mother 61    Diabetes Father     Depression Father     Other Father         septic    Allergies Sister     Hashimoto's thyroiditis Sister     Abdominal aortic aneurysm Sister     Diabetes Sister     No Known Problems Sister     No Known Problems Maternal Grandmother     No Known Problems Maternal Grandfather     No Known Problems Paternal Grandmother     No Known Problems Paternal Grandfather     Hodgkin's lymphoma Brother     No Known Problems Brother     No Known Problems Maternal Aunt     Diabetes Other      Past Surgical History:   Procedure Laterality Date    COLONOSCOPY      DILATION AND CURETTAGE OF UTERUS      EGD      EXPLORATORY LAPAROTOMY      for infertility    HAND SURGERY      Hand excision of tendon cyst    [place] LAPAROSCOPIC APPENDECTOMY N/A 05/03/2022    Procedure: APPENDECTOMY LAPAROSCOPIC;  Surgeon:  Carlos Javier MD;  [facility]: [facility];  Service: General    [place] TEAEC W/PATCH GRF CAROTID VERTB [place] Left 1/31/2023    Procedure: EXPLORATION OF LEFT CAROTID ARTERY; ABORTED ENDARTERECTOMY ARTERY CAROTID;  Surgeon: Ene Ruiz DO;  Location: [facility];  Service: Vascular    SUPERIOR MESTENTERIC ARTERY STENT      WISDOM TOOTH EXTRACTION         Current Outpatient Medications:     apixaban (Eliquis) 5 mg, Take 1 tablet (5 mg total) by mouth 2 (two) times a day, Disp: 180 tablet, Rfl: 5    Ascorbic Acid (vitamin C) 1000 MG tablet, Take 1,000 mg by mouth daily, Disp: , Rfl:     aspirin 81 mg chewable tablet, Chew 1 tablet (81 mg total) daily, Disp: , Rfl: 0    Biotin w/ Vitamins C & E (HAIR/SKIN/NAILS PO), Take by mouth, Disp: , Rfl:     butalbital-acetaminophen-caffeine (FIORICET,ESGIC) -40 mg per tablet, Take 1 tablet by mouth every 6 (six) hours as needed for headaches, Disp: 90 tablet, Rfl: 0    chlordiazepoxide-clidinium (LIBRAX) 5-2 5 mg per capsule, Take 1 capsule by mouth 2 (two) times a day as needed for indigestion or cramping, Disp: , Rfl:     Cholecalciferol 25 MCG (1000 UT) tablet, Take 1,000 Units by mouth daily, Disp: , Rfl:     clotrimazole-betamethasone (LOTRISONE) 1-0 05 % cream, , Disp: , Rfl:     diltiazem (DILACOR XR) 240 MG 24 hr capsule, Take 1 capsule (240 mg total) by mouth daily, Disp: 90 capsule, Rfl: 3    Docusate Sodium (COLACE PO), Take by mouth daily at bedtime, Disp: , Rfl:     famotidine (PEPCID) 20 mg tablet, Take 20 mg by mouth daily at bedtime, Disp: , Rfl:     fexofenadine (ALLEGRA) 180 MG tablet, Take 180 mg by mouth daily, Disp: , Rfl:     fluticasone-vilanterol (BREO ELLIPTA) 200-25 MCG/INH inhaler, Inhale 1 puff daily Rinse mouth after use , Disp: 3 Inhaler, Rfl: 3    furosemide (LASIX) 40 mg tablet, TAKE ONE TABLET BY MOUTH ONCE DAILY, Disp: 90 tablet, Rfl: 0    Galcanezumab-gnlm 120 MG/ML SOAJ, Inject 120 mg under the skin every 30 (thirty) days, Disp: 1 mL, Rfl: 11    hydrOXYzine HCL (ATARAX) 25 mg tablet, Take 2 tablets (50 mg total) by mouth daily at bedtime, Disp: 90 tablet, Rfl: 3    LORazepam (ATIVAN) 1 mg tablet, Take 1 tablet (1 mg total) by mouth every 6 (six) hours as needed for anxiety, Disp: 90 tablet, Rfl: 0    Menaquinone-7 (VITAMIN K2 PO), Take by mouth, Disp: , Rfl:     metoprolol succinate (TOPROL-XL) 100 mg 24 hr tablet, Take 1 tablet (100 mg total) by mouth 2 (two) times a day, Disp: 180 tablet, Rfl: 0    montelukast (SINGULAIR) 10 mg tablet, TAKE ONE TABLET BY MOUTH DAILY AT BEDTIME, Disp: 90 tablet, Rfl: 0   MULTIPLE VITAMIN PO, Take by mouth, Disp: , Rfl:     omeprazole (PriLOSEC) 40 MG capsule, Take 1 capsule (40 mg total) by mouth daily, Disp: 309 capsule, Rfl: 4    ondansetron (Zofran ODT) 4 mg disintegrating tablet, Take 1 tablet (4 mg total) by mouth every 6 (six) hours as needed for nausea or vomiting, Disp: 60 tablet, Rfl: 0    phenazopyridine (PYRIDIUM) 200 mg tablet, Take 1 tablet (200 mg total) by mouth 3 (three) times a day as needed for bladder spasms Not to be used for more than 2 consecutive days  , Disp: 30 tablet, Rfl: 0    Probiotic Product (PROBIOTIC-10 PO), Take 1 tablet by mouth in the morning, Disp: , Rfl:     promethazine (PHENERGAN) 12 5 MG tablet, Take 1 tablet (12 5 mg total) by mouth every 6 (six) hours as needed for nausea or vomiting, Disp: 30 tablet, Rfl: 2    Repatha SureClick 547 MG/ML SOAJ, Inject 140mg under the skin every 2 weeks  , Disp: 2 mL, Rfl: 11    spironolactone (ALDACTONE) 25 mg tablet, Take 1 tablet (25 mg total) by mouth 2 (two) times a day, Disp: 180 tablet, Rfl: 0    traMADol (Ultram) 50 mg tablet, Take 2 tablets (100 mg total) by mouth every 8 (eight) hours as needed for moderate pain, Disp: 60 tablet, Rfl: 3    Turmeric 500 MG CAPS, , Disp: , Rfl:     Zinc 100 MG TABS, Take by mouth daily, Disp: , Rfl:     ezetimibe (ZETIA) 10 mg tablet, Take 1 tablet (10 mg total) by mouth daily (Patient not taking: Reported on 5/2/2023), Disp: 90 tablet, Rfl: 3    naloxone (NARCAN) 4 mg/0 1 mL nasal spray, Administer 1 spray into a nostril  If no response after 2-3 minutes, give another dose in the other nostril using a new spray   (Patient not taking: Reported on 3/28/2023), Disp: 1 each, Rfl: 1    Current Facility-Administered Medications:     cyanocobalamin injection 1,000 mcg, 1,000 mcg, Intramuscular, Q30 Days, Ann Marie Camarena DO, 1,000 mcg at 11/28/22 1017        Review of Systems:  Review of Systems   Constitutional: Negative for activity change, fever and unexpected weight change  HENT: Negative for facial swelling, nosebleeds and voice change  Respiratory: Negative for chest tightness, shortness of breath and wheezing  Cardiovascular: Negative for chest pain, palpitations and leg swelling  Gastrointestinal: Negative for abdominal distention  Genitourinary: Negative for hematuria  Musculoskeletal: Negative for arthralgias  Skin: Negative for color change, pallor, rash and wound  Neurological: Negative for dizziness, seizures and syncope  Psychiatric/Behavioral: Negative for agitation  Physical Exam:  Physical Exam  Vitals reviewed  Constitutional:       Appearance: She is well-developed  HENT:      Head: Normocephalic and atraumatic  Cardiovascular:      Rate and Rhythm: Normal rate and regular rhythm  Heart sounds: Normal heart sounds  Pulmonary:      Effort: Pulmonary effort is normal       Breath sounds: Normal breath sounds  Abdominal:      Palpations: Abdomen is soft  Musculoskeletal:         General: Normal range of motion  Cervical back: Normal range of motion and neck supple  Skin:     General: Skin is warm and dry  Neurological:      Mental Status: She is alert and oriented to person, place, and time  Psychiatric:         Behavior: Behavior normal          Thought Content: Thought content normal          Judgment: Judgment normal          This note was completed in part utilizing [employer] Software  Grammatical errors, random word insertions, spelling mistakes, and incomplete sentences can be an occasional consequence of this system secondary to software limitations, ambient noise, and hardware issues  If you have any questions or concerns about the content, text, or information contained within the body of this dictation, please contact the provider for clarification

## 2023-05-03 ENCOUNTER — APPOINTMENT (OUTPATIENT)
Dept: PHYSICAL THERAPY | Facility: REHABILITATION | Age: 63
End: 2023-05-03
Payer: COMMERCIAL

## 2023-05-03 ENCOUNTER — APPOINTMENT (OUTPATIENT)
Dept: SPEECH THERAPY | Facility: REHABILITATION | Age: 63
End: 2023-05-03
Payer: COMMERCIAL

## 2023-05-04 ENCOUNTER — APPOINTMENT (OUTPATIENT)
Dept: PHYSICAL THERAPY | Facility: REHABILITATION | Age: 63
End: 2023-05-04
Payer: COMMERCIAL

## 2023-05-04 ENCOUNTER — TRANSCRIBE ORDERS (OUTPATIENT)
Dept: GASTROENTEROLOGY | Facility: CLINIC | Age: 63
End: 2023-05-04

## 2023-05-06 DIAGNOSIS — I48.0 PAROXYSMAL ATRIAL FIBRILLATION (HCC): ICD-10-CM

## 2023-05-08 ENCOUNTER — OFFICE VISIT (OUTPATIENT)
Dept: FAMILY MEDICINE CLINIC | Facility: CLINIC | Age: 63
End: 2023-05-08

## 2023-05-08 VITALS
WEIGHT: 192.8 LBS | DIASTOLIC BLOOD PRESSURE: 88 MMHG | SYSTOLIC BLOOD PRESSURE: 142 MMHG | TEMPERATURE: 97.4 F | HEART RATE: 67 BPM | OXYGEN SATURATION: 96 % | HEIGHT: 64 IN | BODY MASS INDEX: 32.91 KG/M2

## 2023-05-08 DIAGNOSIS — E53.8 DEFICIENCY OF OTHER SPECIFIED B GROUP VITAMINS: ICD-10-CM

## 2023-05-08 DIAGNOSIS — I73.9 PERIPHERAL VASCULAR DISEASE (HCC): ICD-10-CM

## 2023-05-08 DIAGNOSIS — R79.9 ABNORMAL FINDING OF BLOOD CHEMISTRY, UNSPECIFIED: ICD-10-CM

## 2023-05-08 DIAGNOSIS — I48.0 PAROXYSMAL ATRIAL FIBRILLATION (HCC): ICD-10-CM

## 2023-05-08 DIAGNOSIS — K55.069 MESENTERIC ARTERY THROMBOSIS (HCC): ICD-10-CM

## 2023-05-08 DIAGNOSIS — R10.12 LEFT UPPER QUADRANT ABDOMINAL PAIN: ICD-10-CM

## 2023-05-08 DIAGNOSIS — I10 BENIGN ESSENTIAL HYPERTENSION: Primary | ICD-10-CM

## 2023-05-08 RX ORDER — CHLORDIAZEPOXIDE HYDROCHLORIDE AND CLIDINIUM BROMIDE 5; 2.5 MG/1; MG/1
1 CAPSULE ORAL
Qty: 120 CAPSULE | Refills: 5
Start: 2023-05-08

## 2023-05-08 RX ORDER — APIXABAN 5 MG/1
TABLET, FILM COATED ORAL
Qty: 180 TABLET | Refills: 0 | Status: SHIPPED | OUTPATIENT
Start: 2023-05-08

## 2023-05-08 NOTE — PROGRESS NOTES
Assessment/Plan: Patient will continue with current regimen for hypertension as well as A-fib  Patient will follow with Dr Chantal Anderson appropriately  Patient will continue to follow with neurology appropriately  Patient will be going for EMG in the future  Recent CT of the neck reviewed with patient  Patient go for Doppler studies bilateral lower extremities  Patient will try samples of Ubrelvy  Follow-up in 3 months or as needed  Diagnoses and all orders for this visit:    Benign essential hypertension  -     CBC and differential; Future  -     Comprehensive metabolic panel; Future  -     Hemoglobin A1C; Future  -     Lipid panel; Future  -     TSH, 3rd generation with Free T4 reflex; Future  -     Vitamin B12; Future  -     Vitamin D 25 hydroxy; Future  -     Cortisol Level, AM Specimen; Future  -     C-reactive protein; Future    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HCC)  -     CBC and differential; Future  -     Comprehensive metabolic panel; Future  -     Hemoglobin A1C; Future  -     Lipid panel; Future  -     TSH, 3rd generation with Free T4 reflex; Future  -     Vitamin B12; Future  -     Vitamin D 25 hydroxy; Future  -     Cortisol Level, AM Specimen; Future  -     C-reactive protein; Future    Paroxysmal atrial fibrillation (HCC)  -     CBC and differential; Future  -     Comprehensive metabolic panel; Future  -     Hemoglobin A1C; Future  -     Lipid panel; Future  -     TSH, 3rd generation with Free T4 reflex; Future  -     Vitamin B12; Future  -     Vitamin D 25 hydroxy; Future  -     Cortisol Level, AM Specimen; Future  -     C-reactive protein; Future    Left upper quadrant abdominal pain  -     chlordiazepoxide-clidinium (LIBRAX) 5-2 5 mg per capsule; Take 1 capsule by mouth 4 (four) times a day (before meals and at bedtime)  -     CBC and differential; Future  -     Comprehensive metabolic panel; Future  -     Hemoglobin A1C; Future  -     Lipid panel;  Future  -     TSH, 3rd generation with Free T4 reflex; Future  -     Vitamin B12; Future  -     Vitamin D 25 hydroxy; Future  -     Cortisol Level, AM Specimen; Future  -     C-reactive protein; Future    Peripheral vascular disease (HCC)  -     VAS lower limb arterial duplex, complete bilateral; Future  -     CBC and differential; Future  -     Comprehensive metabolic panel; Future  -     Hemoglobin A1C; Future  -     Lipid panel; Future  -     TSH, 3rd generation with Free T4 reflex; Future  -     Vitamin B12; Future  -     Vitamin D 25 hydroxy; Future  -     Cortisol Level, AM Specimen; Future  -     C-reactive protein; Future    Abnormal finding of blood chemistry, unspecified  -     Hemoglobin A1C; Future    Deficiency of other specified B group vitamins  -     Vitamin B12; Future    Body mass index (BMI) 30 0-30 9, adult  -     Vitamin D 25 hydroxy; Future            Subjective:        Patient ID: Catherine Dancer is a 70 y o  female  Patient is here to follow-up on ongoing headaches as well as shortness of breath  Patient did see cardiology and is in A-fib and had diltiazem increased  Patient still having some shortness of breath  Patient also with some flank pain which persist   Patient is in physical therapy 2-3 times per week  Patient did see neurology  The following portions of the patient's history were reviewed and updated as appropriate: allergies, current medications, past family history, past medical history, past social history, past surgical history and problem list       Review of Systems   Constitutional: Positive for fatigue  HENT: Negative  Eyes: Negative  Respiratory: Positive for shortness of breath  Cardiovascular: Negative  Gastrointestinal: Negative  Endocrine: Negative  Genitourinary: Positive for flank pain  Skin: Negative  Allergic/Immunologic: Negative  Neurological: Positive for headaches  Hematological: Negative  Psychiatric/Behavioral: Negative              Objective:               BP "142/88 (BP Location: Right arm, Patient Position: Sitting, Cuff Size: Standard)   Pulse 67   Temp (!) 97 4 °F (36 3 °C) (Tympanic)   Ht 5' 4\" (1 626 m)   Wt 87 5 kg (192 lb 12 8 oz)   LMP  (LMP Unknown)   SpO2 96%   BMI 33 09 kg/m²          Physical Exam  Vitals and nursing note reviewed  Constitutional:       General: She is not in acute distress  Appearance: Normal appearance  She is not ill-appearing, toxic-appearing or diaphoretic  HENT:      Head: Normocephalic and atraumatic  Right Ear: Tympanic membrane, ear canal and external ear normal  There is no impacted cerumen  Left Ear: Tympanic membrane, ear canal and external ear normal  There is no impacted cerumen  Nose: Nose normal  No congestion or rhinorrhea  Mouth/Throat:      Mouth: Mucous membranes are moist       Pharynx: No oropharyngeal exudate or posterior oropharyngeal erythema  Eyes:      General: No scleral icterus  Right eye: No discharge  Left eye: No discharge  Extraocular Movements: Extraocular movements intact  Conjunctiva/sclera: Conjunctivae normal       Pupils: Pupils are equal, round, and reactive to light  Neck:      Vascular: No carotid bruit  Cardiovascular:      Rate and Rhythm: Normal rate  Rhythm irregular  Pulses: Normal pulses  Heart sounds: Normal heart sounds  No murmur heard  No friction rub  No gallop  Pulmonary:      Effort: Pulmonary effort is normal  No respiratory distress  Breath sounds: Normal breath sounds  No stridor  No wheezing, rhonchi or rales  Chest:      Chest wall: No tenderness  Musculoskeletal:         General: Tenderness present  No swelling, deformity or signs of injury  Cervical back: Normal range of motion and neck supple  No rigidity  No muscular tenderness  Right lower leg: No edema  Left lower leg: No edema  Lymphadenopathy:      Cervical: No cervical adenopathy     Skin:     General: Skin is warm and " dry       Capillary Refill: Capillary refill takes less than 2 seconds  Coloration: Skin is not jaundiced  Findings: No bruising, erythema, lesion or rash  Neurological:      Mental Status: She is alert and oriented to person, place, and time  Mental status is at baseline  Cranial Nerves: No cranial nerve deficit  Sensory: Sensory deficit present  Motor: Weakness present  Coordination: Coordination normal       Gait: Gait normal       Comments: Left facial numbness   Psychiatric:         Mood and Affect: Mood normal          Behavior: Behavior normal          Thought Content:  Thought content normal          Judgment: Judgment normal

## 2023-05-09 ENCOUNTER — APPOINTMENT (OUTPATIENT)
Dept: SPEECH THERAPY | Facility: REHABILITATION | Age: 63
End: 2023-05-09
Payer: COMMERCIAL

## 2023-05-09 ENCOUNTER — TELEPHONE (OUTPATIENT)
Dept: NEUROLOGY | Facility: CLINIC | Age: 63
End: 2023-05-09

## 2023-05-09 ENCOUNTER — OFFICE VISIT (OUTPATIENT)
Dept: PHYSICAL THERAPY | Facility: REHABILITATION | Age: 63
End: 2023-05-09

## 2023-05-09 DIAGNOSIS — R53.1 LEFT-SIDED WEAKNESS: ICD-10-CM

## 2023-05-09 DIAGNOSIS — R26.89 BALANCE PROBLEM: ICD-10-CM

## 2023-05-09 DIAGNOSIS — I63.9 ACUTE CVA (CEREBROVASCULAR ACCIDENT) (HCC): Primary | ICD-10-CM

## 2023-05-09 NOTE — TELEPHONE ENCOUNTER
Called patient, made her aware of results  Patient would like to know if this can be contributing to her headaches   She does not always have neck pain with her headaches but when they are severe she does have neck pain with it    Please advise

## 2023-05-09 NOTE — PROGRESS NOTES
Daily Note/ Update         Name: Geno Pineda  Date: 23  : 1960  Referring Provider: Al Wynne DO  AUTHORIZATION:   Insurance: Payor: Melanie [employer] REP / Plan: Jay[address] / Product Type: Medicare HMO /   6 of 16 visits through , PN due       SUBJECTIVE: Recently had her ditalizam dose increased and although it feels good for a few hours, she has been feeling more short of breath and feeling heart palpitations  Feels like the medicine was helping her for today's session  HPI: Geno Pineda is a 70 y o  female referred to outpatient physical therapy for the following diagnosis   Encounter Diagnoses   Name Primary? • Acute CVA (cerebrovascular accident) [facility]) Yes   • Left-sided weakness    • Balance problem          Patient-Specific Functional Scale   Task is scored 0 (unable to perform activity) to 10 (ability to perform activity independently)  Activity    1  Carry the laundry up the steps wihtout difficulty  3 8   2  Taking the garbage out  3 7   3  Feel comfortable walking outside without loss of balance  5 10        Patient goals: To get stronger and relieve some of the pain  OBJECTIVE:  BP: 159/75 LUE  Manually /80  HR: 61 bpm      Mobility Measures    Assistive device used? None    Walking speed   84  meters/second   89 m/s   6 Minute Walk Test (100 foot circular course) 500' only 4 minutes 7/10 RPE, felt SOB  R hip pain 4/10   96% SpO2 hr 98 bpm        600' in 4 minutes   890' in 6 minutes     Limited by L foot pain 5/10   SpO2 96%   HR 46 bpm     TUG  n/t Trial 1: 10 51 s  Trial 2: 8 77 s   5 times sit to stand 14 43 seconds 13 43 s   Functional Gait Assessment or Saavedra Balance Scale 15/30 27/30   Modified Sutton Scale Score 2 - Slight disability; unable to carry out all previous activity, but able to look after own affairs without assistance 2 - Slight disability   Patient-Reported Outcome Measure:   Stroke Impact Scale  Patient Specific Functional Scale (PSFS) 11/30 25/30   MCTSIB     4 9/30 foam EC 30s EC Foam      Functional Gait Assessment  3/3 Gait level surface  3/3 Change in gait speed  3/3 Gait with horizontal head turns  3/3 Gait with vertical head turns  3/3 Gait and pivot turn  3/3 Step over obstacle  2/3 Gait with narrow base of support  2/3 Gait with eyes closed  3/3 Ambulating backwards  2/3 Steps  27/30 Total score (less than 22/30 indicates increased risk of fall)      *Required a seated rest break during FGA       ASSESSMENT:  Poonam Willis responded well to today's treatment session  Outcome measures re-assessed with significant improvements observed In static and dynamic balance as well as improvements in aerobic endurance and LE strength  6MWT limited by L foot pain, but patient was able to walk for the full 6 minutes today  She did require a seated rest break during FGA assessment, and reports occasional SOB  Vital signs remained stable throughout session  Poonam Willis is showing great improvements in outcome measures as well as patient reported goals  She would continue from additional skilled physical therapy services to further improve her aerobic endurance as well as functional strength and dynamic balance  STG's:     - Patient improves EC on foam task of MCSTIB for improved static balance within 4 weeks  - MET    - Patient is independent with initial home exercise program for improvements at home within 2 weeks  - partially met    - Patient ambulates for 10 consecutive min with appropriate vital sign response for improved endurance within 4 weeks  - ongoing  LTG's:   - Patient improves distance ambulated on 4 minute walk by 10% within 8 weeks, and ability to tolerate ambulation for full 6 minutes for improved endurance   - met   - Patient decreased time to complete 5xSTS by 3 seconds for improved functional strength and decreased risk of falls - ongoing   - Patient improves "score on FGA to at least 22/30 for improved dynamic balance and decreased risk of falls in 8 weeks  - MET  - Patient improved PSFS to >20/30 to demonstrate improved confidence and ability to perform personal goals in 8 weeks  - MET    Precautions Precautions: a-fib, anxiety, HTN, COPD, closely monitor vital signs     Specialty Treatment Diary  4/26 4/18 4/21 5/9   Home exercise program         Walking/endurance TM SOLO BUE x10 min 1 5 mph   400'  SPO2 98%    Sit to Stand  2 UE push off 15 sec  0 UE assist 22 sec Biodex  1 0 mph 1 min  1 5 mph 2 min  2 0 mph 7 min  10 min    Rest  2 0 mph x 5 min   6MWT    5xSTS  TUG     Static and dynamic balance SOLO Stepups onto foam 6\" 2x1min fwd  125/66 HR 69      10\" racheal 4 laps forwards/sideways    Added Foam beam  4 laps each way    Eyes closed  30' x 3 laps  12 steps- 10 steps    Resting perids required TUG 3  Solostep 80' laps x 3  HT  HN  Solostep 50' laps x 3  Backwards  EC     Solostep    Compliant surface management ( object under for increased challenge)   circles x 4  Sideways    100' x 2  HT  HN  180 turns 2# weights  EC   Hurdles 10\"  4 laps         fwd         Sideways    Foam beam x 3 laps ea  Forwards  sideways     FGA  MCTSIB ECFoam    Foam beams 3x  FWD    Added 3 hurdles 3x FWD/Sideways  3x   Strengthening  Step ups 6\" plus foam mat  1 min each forwards  1 5 min sidestepping   nv    Stretching                PLAN OF CARE:  Patient will benefit from physical therapy 1-2 times per week for 4 more weeks per POC  Neuromuscular re-education, therapeutic exercises, and therapeutic activities as outlined in grids      [facility]  5/9/2023  "

## 2023-05-09 NOTE — TELEPHONE ENCOUNTER
----- Message from [place], [place] sent at 5/2/2023 12:29 PM EDT -----  Please let patient know she has degenerative changes of the cervical spine that are causing some very minimal narrowing where the nerve exit the spine   Her spinal canal and cord were normal

## 2023-05-09 NOTE — TELEPHONE ENCOUNTER
That would be very difficult to say if it is the main cause  But it could definitely be somewhat contributing   If she feels significant neck pain could always try PT or seeing pain management

## 2023-05-10 ENCOUNTER — OFFICE VISIT (OUTPATIENT)
Dept: GASTROENTEROLOGY | Facility: CLINIC | Age: 63
End: 2023-05-10

## 2023-05-10 VITALS
BODY MASS INDEX: 32.44 KG/M2 | SYSTOLIC BLOOD PRESSURE: 134 MMHG | DIASTOLIC BLOOD PRESSURE: 73 MMHG | WEIGHT: 189 LBS | HEART RATE: 88 BPM | TEMPERATURE: 98.1 F

## 2023-05-10 DIAGNOSIS — R10.31 RIGHT LOWER QUADRANT ABDOMINAL PAIN: ICD-10-CM

## 2023-05-10 DIAGNOSIS — R11.0 NAUSEA: ICD-10-CM

## 2023-05-10 DIAGNOSIS — R63.4 UNINTENTIONAL WEIGHT LOSS: Primary | ICD-10-CM

## 2023-05-10 DIAGNOSIS — M54.2 NECK PAIN: ICD-10-CM

## 2023-05-10 DIAGNOSIS — R68.81 EARLY SATIETY: ICD-10-CM

## 2023-05-10 RX ORDER — PROMETHAZINE HYDROCHLORIDE 25 MG/1
25 TABLET ORAL EVERY 6 HOURS PRN
Qty: 60 TABLET | Refills: 1 | Status: SHIPPED | OUTPATIENT
Start: 2023-05-10 | End: 2023-07-09

## 2023-05-10 RX ORDER — TRAMADOL HYDROCHLORIDE 50 MG/1
100 TABLET ORAL EVERY 8 HOURS PRN
Qty: 60 TABLET | Refills: 1 | Status: SHIPPED | OUTPATIENT
Start: 2023-05-10

## 2023-05-10 NOTE — PROGRESS NOTES
[facility] - Outpatient Follow-up Note  Ilana White 70 y o  female MRN: [id number]  Encounter: [id number]          ASSESSMENT AND PLAN:      1  Nausea  We will increase dose of promethazine to 25 mg   - promethazine (PHENERGAN) 25 mg tablet; Take 1 tablet (25 mg total) by mouth every 6 (six) hours as needed for nausea or vomiting  Dispense: 60 tablet; Refill: 1    2  Unintentional weight loss  3  Early satiety  4  Right lower quadrant abdominal pain  - CTA abdomen pelvis w wo contrast; Future  - Complaining of new onset right lower quadrant abdominal pain with unintentional weight loss, she attributes it to significant early satiety and significant worsening postprandial nature of the pain  Reports that this pain is different to her chronically reported left lower quadrant pain  - Given her significant vasculopathy history and new onset symptoms we will obtain CT abdomen to rule out any ischemic injury    5  GERD  Will increase PPI to twice a day  FUP in 3 mo or sooner if needed  ______________________________________________________________________    SUBJECTIVE:      Patient is a 75-year-old female with a past medical history including but not limited to 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, carotid artery stenosis, hypertension, atrial fibrillation on Eliquis, obesity amongst other medical comorbidities who presents today for follow-up of her chronic GI issues  Today she reports that she has been having worsening of heartburn symptoms which has been making her nausea worse  Denies any significant episodes of vomiting however she has nausea constantly through the day  She does report significant heartburn however denies any nocturnal symptoms  She has been taking omeprazole 40 mg in the morning with minimal improvement  She does take Phenergan 12 5 which does help however feels like the doses inadequate at this time      Today she also reports "new onset right lower quadrant abdominal pain  Reports that this is different to her chronic left lower quadrant abdominal pain  This is led to unintentional weight loss given early satiety and significant postprandial worsening of the pain  Last EGD and colonoscopy was in 2022 and were essentially unremarkable    REVIEW OF SYSTEMS IS OTHERWISE NEGATIVE  Historical Information   Past Medical History:   Diagnosis Date   • A-fib ([facility] [address] ) 03/2020   • Allergic    • Anxiety    • Arthritis    • Asthma    • Back pain     and muscle pain   • Bruises easily    • Chronic pain disorder     Interstitial pain   • Colon polyp    • Dental bridge present    • Depression    • Eczema    • GERD (gastroesophageal reflux disease)    • Heart murmur    • History of blood clots     per pt \"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\"   • History of pneumonia    • Hives    • Hyperlipidemia    • Hypertension    • Infertility, female    • Interstitial cystitis    • Migraine     sees neurologist   • Motion sickness    • Palpitations    • PONV (postoperative nausea and vomiting)    • Psychiatric disorder    • TIA (transient ischemic attack) 09/2022    per pt --\"had MRI and 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\"   • Urinary incontinence     wears Pads   • Wears glasses      Past Surgical History:   Procedure Laterality Date   • COLONOSCOPY     • DILATION AND CURETTAGE OF UTERUS     • EGD     • EXPLORATORY LAPAROTOMY      for infertility   • HAND SURGERY      Hand excision of tendon cyst   • KY LAPAROSCOPIC APPENDECTOMY N/A 05/03/2022    Procedure: APPENDECTOMY LAPAROSCOPIC;  Surgeon:  Devin Kelsey MD;  Location: [facility];  Service: General   • [place] [facility]    Procedure: EXPLORATION OF LEFT CAROTID ARTERY; ABORTED ENDARTERECTOMY ARTERY CAROTID;  Surgeon: Wilda Alonso DO;  Location: [facility];  Service: Vascular   • SUPERIOR MESTENTERIC ARTERY STENT     • WISDOM " TOOTH EXTRACTION       Social History   Social History     Substance and Sexual Activity   Alcohol Use Never     Social History     Substance and Sexual Activity   Drug Use No     Social History     Tobacco Use   Smoking Status Former   • Packs/day: 0 50   • Years: 10 00   • Pack years: 5 00   • Types: Cigarettes   • Quit date:    • Years since quittin 3   • Passive exposure: Never   Smokeless Tobacco Never     Family History   Problem Relation Age of Onset   • Lung cancer Mother 61   • Brain cancer Mother 61   • Diabetes Father    • Depression Father    • Other Father         septic   • Allergies Sister    • Hashimoto's thyroiditis Sister    • Abdominal aortic aneurysm Sister    • Diabetes Sister    • No Known Problems Sister    • No Known Problems Maternal Grandmother    • No Known Problems Maternal Grandfather    • No Known Problems Paternal Grandmother    • No Known Problems Paternal Grandfather    • Hodgkin's lymphoma Brother    • No Known Problems Brother    • No Known Problems Maternal Aunt    • Diabetes Other        Meds/Allergies       Current Outpatient Medications:   •  Ascorbic Acid (vitamin C) 1000 MG tablet  •  aspirin 81 mg chewable tablet  •  Biotin w/ Vitamins C & E (HAIR/SKIN/NAILS PO)  •  butalbital-acetaminophen-caffeine (FIORICET,ESGIC) -40 mg per tablet  •  chlordiazepoxide-clidinium (LIBRAX) 5-2 5 mg per capsule  •  Cholecalciferol 25 MCG (1000 UT) tablet  •  clotrimazole-betamethasone (LOTRISONE) 1-0 05 % cream  •  diltiazem (DILACOR XR) 240 MG 24 hr capsule  •  Docusate Sodium (COLACE PO)  •  Eliquis 5 MG  •  famotidine (PEPCID) 20 mg tablet  •  fexofenadine (ALLEGRA) 180 MG tablet  •  fluticasone-vilanterol (BREO ELLIPTA) 200-25 MCG/INH inhaler  •  furosemide (LASIX) 40 mg tablet  •  Galcanezumab-gnlm 120 MG/ML SOAJ  •  hydrOXYzine HCL (ATARAX) 25 mg tablet  •  LORazepam (ATIVAN) 1 mg tablet  •  Menaquinone-7 (VITAMIN K2 PO)  •  metoprolol succinate (TOPROL-XL) 100 mg 24 hr "tablet  •  montelukast (SINGULAIR) 10 mg tablet  •  MULTIPLE VITAMIN PO  •  omeprazole (PriLOSEC) 40 MG capsule  •  ondansetron (Zofran ODT) 4 mg disintegrating tablet  •  phenazopyridine (PYRIDIUM) 200 mg tablet  •  Probiotic Product (PROBIOTIC-10 PO)  •  promethazine (PHENERGAN) 25 mg tablet  •  Repatha SureClick 446 MG/ML SOAJ  •  spironolactone (ALDACTONE) 25 mg tablet  •  traMADol (Ultram) 50 mg tablet  •  Turmeric 500 MG CAPS  •  Zinc 100 MG TABS  •  naloxone (NARCAN) 4 mg/0 1 mL nasal spray    Current Facility-Administered Medications:   •  cyanocobalamin injection 1,000 mcg, 1,000 mcg, Intramuscular, Q30 Days, 1,000 mcg at 11/28/22 1017    Allergies   Allergen Reactions   • Ace Inhibitors Angioedema and Anaphylaxis     Anaphylaxis   • Benicar [Olmesartan] Angioedema     See Allergy note from 9/11/2008  Swollen ankles/legs   • Hydralazine Other (See Comments)     Lip swelling  Flank pain   • Other Anaphylaxis and Other (See Comments)     Other reaction(s): angioadema  Other reaction(s): Other (See Comments)  Preservatives- itching throat closes, hi  Other reaction(s): angioadema  E Z Cat - hives throat closes itching,  Preservatives- itching throat closes, hi  Artificial sweeteners   • Valsartan Angioedema     Lips, face swollen   • Sulfa Antibiotics Hives     stuffiness,itching,hives,throat closing--per pt \"sometimes able to take depending on the brand and the filler or possibly preservatives in it\"   • Ampicillin Hives     Depends on brand some preservatives can react  Has recently tolerated oral amoxicillin  • Motrin [Ibuprofen] Other (See Comments)     Per pt \" told not to take due to A Fib\"   • Wound Dressing Adhesive Other (See Comments)     Per pt Adhesive on EKG leads caused redness           Objective     Blood pressure 134/73, pulse 88, temperature 98 1 °F (36 7 °C), temperature source Tympanic, weight 85 7 kg (189 lb), not currently breastfeeding  Body mass index is 32 44 kg/m²        PHYSICAL " EXAM:      General Appearance:   Alert, cooperative, no distress   HEENT:   Normocephalic    Respi:   No labored breathing, able to speak in full sentences   Heart[de-identified]   Normal rate on pulse palpation   Abdomen:    Soft, nondistended, no guarding, mild tenderness in the right lower quadrant area   Genitalia:   Deferred    Rectal:   Deferred    Extremities:  No cyanosis, clubbing or edema    Pulses:  2+ and symmetric    Skin:  No jaundice   Lymph nodes:  No palpable cervical lymphadenopathy        Lab Results:   No visits with results within 1 Day(s) from this visit     Latest known visit with results is:   Admission on 02/20/2023, Discharged on 02/22/2023   Component Date Value   • Ventricular Rate 02/20/2023 61    • Atrial Rate 02/20/2023 61    • GA Interval 02/20/2023 166    • QRSD Interval 02/20/2023 88    • QT Interval 02/20/2023 446    • QTC Interval 02/20/2023 448    • P Axis 02/20/2023 11    • QRS Axis 02/20/2023 -24    • T Wave Axis 02/20/2023 31    • POC Glucose 02/20/2023 93    • Sodium 02/20/2023 135    • Potassium 02/20/2023 4 7    • Chloride 02/20/2023 101    • CO2 02/20/2023 26    • ANION GAP 02/20/2023 8    • BUN 02/20/2023 22    • Creatinine 02/20/2023 0 66    • Glucose 02/20/2023 94    • Calcium 02/20/2023 9 4    • eGFR 02/20/2023 92    • WBC 02/20/2023 6 62    • RBC 02/20/2023 4 22    • Hemoglobin 02/20/2023 12 7    • Hematocrit 02/20/2023 37 9    • MCV 02/20/2023 90    • MCH 02/20/2023 30 1    • MCHC 02/20/2023 33 5    • RDW 02/20/2023 12 2    • Platelets 72/87/0101 233    • MPV 02/20/2023 9 0    • Protime 02/20/2023 14 6 (H)    • INR 02/20/2023 1 14    • PTT 02/20/2023 32    • hs TnI 0hr 02/20/2023 5    • SARS-CoV-2 02/20/2023 Negative    • INFLUENZA A PCR 02/20/2023 Negative    • INFLUENZA B PCR 02/20/2023 Negative    • RSV PCR 02/20/2023 Negative    • hs TnI 2hr 02/20/2023 6    • Delta 2hr hsTnI 02/20/2023 1    • Ventricular Rate 02/20/2023 60    • Atrial Rate 02/20/2023 60    • GA Interval 02/20/2023 158    • QRSD Interval 02/20/2023 88    • QT Interval 02/20/2023 444    • QTC Interval 02/20/2023 444    • P Axis 02/20/2023 1    • QRS Axis 02/20/2023 -26    • T Wave Axis 02/20/2023 19    • hs TnI 4hr 02/20/2023 8    • Delta 4hr hsTnI 02/20/2023 3    • A4C EF 02/21/2023 60    • LVOT stroke volume 02/21/2023 65 35    • LVOT stroke volume index 02/21/2023 32 80    • LVIDd 02/21/2023 4 50    • LVIDS 02/21/2023 2 90    • IVSd 02/21/2023 1 20    • LVPWd 02/21/2023 1 10    • FS 02/21/2023 36    • MV E' Tissue Velocity Se* 02/21/2023 6    • MV E' Tissue Velocity La* 02/21/2023 8    • E wave deceleration time 02/21/2023 150    • MV Peak E Johan 02/21/2023 86    • MV Peak A Johan 02/21/2023 0 88    • AV LVOT peak gradient 02/21/2023 4    • LVOT peak VTI 02/21/2023 23 06    • LVOT peak johan 02/21/2023 0 94    • RVID d 02/21/2023 4 4    • Tricuspid annular plane * 02/21/2023 2 00    • LA size 02/21/2023 3 3    • LA length (A2C) 02/21/2023 5 60    • ANETA A4C 02/21/2023 22 2    • RAA A4C 02/21/2023 17    • LVOT diameter 02/21/2023 1 9    • Aortic valve peak veloci* 02/21/2023 1 47    • Ao VTI 02/21/2023 28 06    • AV mean gradient 02/21/2023 5    • LVOT mn grad 02/21/2023 2 0    • AV peak gradient 02/21/2023 9    • AV Deceleration Time 02/21/2023 1,812    • AV regurgitation pressur* 02/21/2023 526    • AV area by cont VTI 02/21/2023 2 3    • AV area peak johan 02/21/2023 1 8    • MV stenosis pressure 1/2* 02/21/2023 44    • MV valve area p 1/2 meth* 02/21/2023 5 00    • TR Peak Johan 02/21/2023 2 2    • Triscuspid Valve Regurgi* 02/21/2023 19 0    • Ao root 02/21/2023 3 70    • AV peak gradient 02/21/2023 87    • Aortic valve mean veloci* 02/21/2023 10 20    • Tricuspid valve peak reg* 02/21/2023 2 17    • Left ventricular stroke * 02/21/2023 60 00    • IVS 02/21/2023 1 2    • LEFT VENTRICLE SYSTOLIC * 27/65/3840 32    • LV DIASTOLIC VOLUME (MOD* 27/22/7872 91    • Left Atrium Area-systoli* 02/21/2023 22 9    • Left Atrium Area-systoli* 02/21/2023 23 4    • LVSV, 2D 02/21/2023 60    • LVOT area 02/21/2023 2 83    • DVI 02/21/2023 0 64    • AV valve area 02/21/2023 2 33    • LV EF 02/21/2023 60    • Est  RA pres 02/21/2023 3 0    • Right Ventricular Peak S* 02/21/2023 22 00    • Ventricular Rate 02/20/2023 56    • Atrial Rate 02/20/2023 56    • ND Interval 02/20/2023 178    • QRSD Interval 02/20/2023 86    • QT Interval 02/20/2023 450    • QTC Interval 02/20/2023 434    • P Axis 02/20/2023 15    • QRS Axis 02/20/2023 -23    • T Wave Axis 02/20/2023 26    • Cholesterol 02/21/2023 249 (H)    • Triglycerides 02/21/2023 322 (H)    • HDL, Direct 02/21/2023 46 (L)    • LDL Calculated 02/21/2023 139 (H)    • Hemoglobin A1C 02/21/2023 5 0    • EAG 02/21/2023 97          Radiology Results:   MRI cervical spine wo contrast    Result Date: 5/1/2023  Narrative: MRI CERVICAL SPINE WITHOUT CONTRAST INDICATION: R20 2: Paresthesia of skin  COMPARISON:  None  TECHNIQUE:  Multiplanar, multisequence imaging of the cervical spine was performed    IMAGE QUALITY:  Diagnostic FINDINGS: ALIGNMENT: Trace grade 1 anterolisthesis of C4 on C5  Minimal grade 1 retrolisthesis of C6 on C7  MARROW SIGNAL: Mild scattered degenerative endplate changes  No focally suspicious marrow lesions  No bone marrow edema or compression abnormality  CERVICAL AND VISUALIZED THORACIC CORD:  Normal signal within the visualized cord  PREVERTEBRAL AND PARASPINAL SOFT TISSUES:  Normal  VISUALIZED POSTERIOR FOSSA:  The visualized posterior fossa demonstrates no abnormal signal  CERVICAL DISC SPACES: C2-C3:  Normal  C3-C4: There is a disc osteophyte complex with a superimposed left neural foraminal disc protrusion  There is bilateral facet hypertrophy  Mild to moderate left neural foraminal narrowing  Central canal patent  Right neural foramen patent  C4-C5: There is uncovering the intervertebral disc space  There is a small right neural foraminal disc protrusion   Mild right neural foraminal narrowing  Central canal and left neural foramen patent  C5-C6: There is a disc osteophyte complex with a superimposed right neural foraminal disc protrusion  Mild right neural foraminal narrowing  Central canal patent  Left neural foramina patent  C6-C7: There is loss of disc height and signal  There is a left neural foraminal disc protrusion  Mild left neural foraminal narrowing  Minimal central canal narrowing  Right neural foramen patent  C7-T1: There is loss of disc height and signal  There is small central disc protrusion  No significant central canal or neural foraminal narrowing  UPPER THORACIC DISC SPACES:  Normal  OTHER FINDINGS:  None  Impression: Mild scattered spondylosis without cord compression or cord signal abnormality   Workstation performed: [id number]       Taylor Adams MD  Fellow  Gastroenterology  [facility]

## 2023-05-11 ENCOUNTER — APPOINTMENT (OUTPATIENT)
Dept: PHYSICAL THERAPY | Facility: REHABILITATION | Age: 63
End: 2023-05-11
Payer: COMMERCIAL

## 2023-05-11 NOTE — TELEPHONE ENCOUNTER
Called and left detailed message regarding below,asked to call us back if any referral needed ,or with any questions or concerns

## 2023-05-12 DIAGNOSIS — G43.001 MIGRAINE WITHOUT AURA AND WITH STATUS MIGRAINOSUS, NOT INTRACTABLE: ICD-10-CM

## 2023-05-12 RX ORDER — BUTALBITAL, ACETAMINOPHEN AND CAFFEINE 50; 325; 40 MG/1; MG/1; MG/1
1 TABLET ORAL EVERY 6 HOURS PRN
Qty: 90 TABLET | Refills: 0 | Status: SHIPPED | OUTPATIENT
Start: 2023-05-12

## 2023-05-12 NOTE — TELEPHONE ENCOUNTER
Pt was chad Geronimo, it worked yet insurance will not cover med    Pt would like refill on fioricet

## 2023-05-15 ENCOUNTER — APPOINTMENT (OUTPATIENT)
Dept: SPEECH THERAPY | Facility: REHABILITATION | Age: 63
End: 2023-05-15
Payer: COMMERCIAL

## 2023-05-15 ENCOUNTER — TELEPHONE (OUTPATIENT)
Dept: NEUROLOGY | Facility: CLINIC | Age: 63
End: 2023-05-15

## 2023-05-15 ENCOUNTER — TELEPHONE (OUTPATIENT)
Dept: UROLOGY | Facility: MEDICAL CENTER | Age: 63
End: 2023-05-15

## 2023-05-15 ENCOUNTER — TELEPHONE (OUTPATIENT)
Dept: GASTROENTEROLOGY | Facility: CLINIC | Age: 63
End: 2023-05-15

## 2023-05-15 DIAGNOSIS — R39.9 UTI SYMPTOMS: Primary | ICD-10-CM

## 2023-05-15 DIAGNOSIS — G43.709 CHRONIC MIGRAINE WITHOUT AURA WITHOUT STATUS MIGRAINOSUS, NOT INTRACTABLE: Primary | ICD-10-CM

## 2023-05-15 NOTE — TELEPHONE ENCOUNTER
Returned call to patient   Urine has fowl odor, cramping with urination, also has burning with urination  Some lower back pain  B/l rash on both legs  No vaginal discharge reported  Slight chills , no fevers  + frequency and urgency  + nausea    Patient is not currently taking Methenamine  Is taking hydroxyzine and Prelief , pyridium not taking daily  Triamcinolone ointment , ketoconazole cream, nitostatin powder for rash  Patient is currently in pelvic floor PT   Patient did obtain urine sample this morning and had a lot of sediment at the bottom  Orders placed for urine testing  Patient will go for testing  Advised to  Increase hydration  and monitor   Will send message to provider for additional recommendations

## 2023-05-15 NOTE — TELEPHONE ENCOUNTER
Patient called stating she is having burning,painful urination, frequency and fowl smell  She states she wants to know if there is a standing order for ua /uc for her to do  She also wants to refills on peridium         Patient can be reached at [phone]

## 2023-05-15 NOTE — TELEPHONE ENCOUNTER
I returned call to patient     I advised she is to call central scheduling and be scheduled once she is scheduled pec team with [facility] and pt will be contacted if there is a denial  I left phone number for central scheduling

## 2023-05-15 NOTE — TELEPHONE ENCOUNTER
Patients GI provider:  Dr Guero RAMIRES    Number to return call: [phone]    Reason for call: Pt calling to see if CT scan is authorized so she can reschedule  Status is pending in chart  Reached out to Prior auth team member she has sent notes and is waiting for approval  Patient states she would like to have test asap due to her pain  Please call PT to let her know when authorized so she can schedule      Scheduled procedure/appointment date if applicable: Apt/procedure 5/10/23

## 2023-05-15 NOTE — TELEPHONE ENCOUNTER
"Patient left voicemail regarding medications  She is going to be starting injectables  PCP refilled fioricet (90 tablets) and also gave samples of ubrelvy which she states works really well  Asking for our office to order Karly Drake - this will require PA as she has already checked with insurance  Call returned to patient  Acknowledged voicemail received  Patient asking if there are specific instructions for fioricet \"wean\"     "

## 2023-05-16 ENCOUNTER — TELEPHONE (OUTPATIENT)
Dept: UROLOGY | Facility: CLINIC | Age: 63
End: 2023-05-16

## 2023-05-16 ENCOUNTER — LAB (OUTPATIENT)
Dept: LAB | Facility: HOSPITAL | Age: 63
End: 2023-05-16

## 2023-05-16 ENCOUNTER — OFFICE VISIT (OUTPATIENT)
Dept: PHYSICAL THERAPY | Facility: REHABILITATION | Age: 63
End: 2023-05-16

## 2023-05-16 DIAGNOSIS — R53.1 LEFT-SIDED WEAKNESS: ICD-10-CM

## 2023-05-16 DIAGNOSIS — I63.9 ACUTE CVA (CEREBROVASCULAR ACCIDENT) (HCC): Primary | ICD-10-CM

## 2023-05-16 DIAGNOSIS — N39.0 RECURRENT UTI: Primary | ICD-10-CM

## 2023-05-16 DIAGNOSIS — R26.89 BALANCE PROBLEM: ICD-10-CM

## 2023-05-16 DIAGNOSIS — R39.9 UTI SYMPTOMS: ICD-10-CM

## 2023-05-16 LAB
BACTERIA UR QL AUTO: ABNORMAL /HPF
BILIRUB UR QL STRIP: NEGATIVE
CLARITY UR: ABNORMAL
COLOR UR: ABNORMAL
GLUCOSE UR STRIP-MCNC: NEGATIVE MG/DL
HGB UR QL STRIP.AUTO: NEGATIVE
KETONES UR STRIP-MCNC: NEGATIVE MG/DL
LEUKOCYTE ESTERASE UR QL STRIP: ABNORMAL
MUCOUS THREADS UR QL AUTO: ABNORMAL
NITRITE UR QL STRIP: POSITIVE
NON-SQ EPI CELLS URNS QL MICRO: ABNORMAL /HPF
PH UR STRIP.AUTO: 6 [PH]
PROT UR STRIP-MCNC: ABNORMAL MG/DL
RBC #/AREA URNS AUTO: ABNORMAL /HPF
SP GR UR STRIP.AUTO: 1.03 (ref 1–1.03)
UROBILINOGEN UR STRIP-ACNC: 2 MG/DL
WBC #/AREA URNS AUTO: ABNORMAL /HPF

## 2023-05-16 RX ORDER — CEPHALEXIN 500 MG/1
500 CAPSULE ORAL EVERY 6 HOURS SCHEDULED
Qty: 28 CAPSULE | Refills: 0 | Status: SHIPPED | OUTPATIENT
Start: 2023-05-16 | End: 2023-05-23

## 2023-05-16 NOTE — TELEPHONE ENCOUNTER
Called and advised pt of all of the below  She verbalized clear understanding of all instructions  But she is unable to write it down bec she is driving right now  Requesting message below be sent to her mychart  Sent  Advised that I will initiate PA for Ubrelvy today  It can take up to 72 hrs to get the final determination  She verbalized understanding  Bernie Carr PA initiated on CMM    (Key: [id number])   Unable to attach office notes bec >20 pages    Awaiting determination

## 2023-05-16 NOTE — TELEPHONE ENCOUNTER
"Regarding fioricet, gradually decrease the time between doses and then decrease how often she takes it in a day (going from 3 times a day to 2 and then 1 over every few days), then if needed can replace the one tab in am with \"exedrin tension\" for a week and then try and stop that as well, but continuing normal morning caffeine otherwise  Basically the body can be physically addicted to the caffeine and the barbituate in the fioricet, so gradually wean off of it the fiorocet first and then the exedrin tension is if needed for the caffeine withdrawal part since exedrin tension and fioricet are the same thing otherwise  I would suggest waiting until she has the two shot loading dose of emgality on board and then once she starts to feel better, gradually weaning off  Ubelvy 100 mg sent, yes we will see what happens regarding that and insurance     "

## 2023-05-16 NOTE — TELEPHONE ENCOUNTER
Called patient to let her know that her urine testing came back positive for infection and that Keflex was sent to her pharmacy  Pt appreciated the call and confirmed understanding              ----- Message from Janna Andrade MD sent at 5/16/2023  1:42 PM EDT -----  Please let her know that I went ahead and sent Keflex to her pharmacy  That was what her last infection was sensitive to  I do not see an allergy to this

## 2023-05-16 NOTE — PROGRESS NOTES
"Daily Note         Name: Carlyle Gibson  Date: 23  : 1960  Referring Provider: Tito Morales DO  AUTHORIZATION:   Insurance: Payor: Jessica Lindsey [employer] REP / Plan: Nida Maokori [address] / Product Type: Medicare HMO /   6 of 16 visits through , PN due       SUBJECTIVE: Reports UTI injection with pain and discomfort component today  HPI: Carlyle Gibson is a 70 y o  female referred to outpatient physical therapy for the following diagnosis   Encounter Diagnoses   Name Primary? • Acute CVA (cerebrovascular accident) [facility]) Yes   • Left-sided weakness    • Balance problem          Patient-Specific Functional Scale   Task is scored 0 (unable to perform activity) to 10 (ability to perform activity independently)  Activity    1  Carry the laundry up the steps wihtout difficulty  3 8   2  Taking the garbage out  3 7   3  Feel comfortable walking outside without loss of balance  5 10        Patient goals: To get stronger and relieve some of the pain  OBJECTIVE:  BP: 122/86      ASSESSMENT:  Patient is being worked up for a UTI, requires resting periods between activities and encouragement for H2O drinking  Continued with challenging high level balance and endurance  She tolerated treatment well and will benefit from continued therapy      Precautions Precautions: a-fib, anxiety, HTN, COPD, closely monitor vital signs     Specialty Treatment Diary  4/21 5/9 5/15   Home exercise program      Walking/endurance Biodex  1 0 mph 1 min  1 5 mph 2 min  2 0 mph 7 min  10 min    Rest  2 0 mph x 5 min   6MWT    5xSTS  TUG   Scifit 10 min  65-75 stp    True treadmill  1 3 mph 2  Min  1 5 mph 3 min  2 UE support to 1 UE support   Static and dynamic balance Solostep    Compliant surface management ( object under for increased challenge)   circles x 4  Sideways    100' x 2  HT  HN  180 turns 2# weights  EC   Hurdles 10\"  4 laps         fwd         " Sideways    Foam beam x 3 laps ea  Forwards  sideways     FGA  MCTSIB ECFoam    Foam beams 3x  FWD    Added 3 hurdles 3x FWD/Sideways  3x Foam beams  Forwards 3 laps  Sideways 3 laps  Added 10' hurdles with increased challenge  4 frw/4 sideways    100' x 3  180 degree turns    Backwards 100' x 1    Incline surface  Forwards/turn  Forwards/backwards * LOB 3 laps each    Incline forwards downs/step onto compliance surface/ steping down/return  6 laps   Strengthening nv  Sit to stand  5' x 1  #4 10' x 1  5' x 1   Stretching              PLAN OF CARE:  Patient will benefit from physical therapy 1-2 times per week for 4 more weeks per POC  Neuromuscular re-education, therapeutic exercises, and therapeutic activities as outlined in grids      Bobbi Smith, PT  5/16/2023

## 2023-05-17 DIAGNOSIS — R39.82 CHRONIC BLADDER PAIN: ICD-10-CM

## 2023-05-17 DIAGNOSIS — N30.10 INTERSTITIAL CYSTITIS: ICD-10-CM

## 2023-05-17 RX ORDER — PHENAZOPYRIDINE HYDROCHLORIDE 200 MG/1
200 TABLET, FILM COATED ORAL 3 TIMES DAILY PRN
Qty: 30 TABLET | Refills: 0 | Status: SHIPPED | OUTPATIENT
Start: 2023-05-17

## 2023-05-17 RX ORDER — PHENAZOPYRIDINE HYDROCHLORIDE 200 MG/1
200 TABLET, FILM COATED ORAL 3 TIMES DAILY PRN
Qty: 30 TABLET | Refills: 0 | Status: SHIPPED | OUTPATIENT
Start: 2023-05-17 | End: 2023-05-17 | Stop reason: SDUPTHER

## 2023-05-17 NOTE — TELEPHONE ENCOUNTER
Pt called and stated that the pharmacy told her that the pyridium came back that we denied the script on the pharmacy end  Please send script again  Pt is at the pharmacy

## 2023-05-18 ENCOUNTER — APPOINTMENT (OUTPATIENT)
Dept: PHYSICAL THERAPY | Facility: REHABILITATION | Age: 63
End: 2023-05-18
Payer: COMMERCIAL

## 2023-05-18 LAB — BACTERIA UR CULT: ABNORMAL

## 2023-05-22 ENCOUNTER — TELEPHONE (OUTPATIENT)
Dept: CARDIOLOGY CLINIC | Facility: CLINIC | Age: 63
End: 2023-05-22

## 2023-05-22 ENCOUNTER — OFFICE VISIT (OUTPATIENT)
Dept: SPEECH THERAPY | Facility: REHABILITATION | Age: 63
End: 2023-05-22

## 2023-05-22 DIAGNOSIS — G51.0 PARTIAL FACIAL NERVE PALSY: ICD-10-CM

## 2023-05-22 DIAGNOSIS — S04.52XS INJURY OF LEFT FACIAL NERVE, SEQUELA: ICD-10-CM

## 2023-05-22 DIAGNOSIS — R47.1 DYSARTHRIA: Primary | ICD-10-CM

## 2023-05-22 DIAGNOSIS — R13.10 DYSPHAGIA, UNSPECIFIED TYPE: ICD-10-CM

## 2023-05-22 DIAGNOSIS — R47.9 SPEECH DISORDER: ICD-10-CM

## 2023-05-22 DIAGNOSIS — Z86.73 HISTORY OF CVA (CEREBROVASCULAR ACCIDENT): ICD-10-CM

## 2023-05-22 NOTE — PROGRESS NOTES
Speech-Language Pathology Re-Evaluation    Today's date: 2023  Patient’s name: Lilian Qiu  : 1960  MRN: [id number]  Safety measures: ALLERGIES Multiple, dysphagia related--- ARTIFICIAL SWEETENERS, PRESERVATIVES  Referring provider: Julieth Fernández DO    Encounter Diagnosis     ICD-10-CM    1  Dysarthria  R47 1       2  Dysphagia, unspecified type  R13 10       3  Partial facial nerve palsy  G51 0       4  Speech disorder  R47 9       5  History of CVA (cerebrovascular accident)  Z86 73       6  Injury of left facial nerve, sequela  S04  52XS           Visit tracking:  -Referring provider: Epic  -Billing guidelines: CMS  -Visit #5  -Insurance: Medicare ([employer] secondary)           Subjective comments: Patient reports she is doing well, has noted improvements in smile symmetry, speech and swallowing  Patient reports pain on L side of neck near surgical site, SLP strongly encouraged to discuss with MD  Patient verbalized understanding    Patient's goal(s): to improve swallowing/speech accuracy    Assessments      -Facial appearance Left facial droop and Asymmetric smile-- however improving   -Dentition Adequate   -Labial function Decreased ROM (left side), Decreased strength (left side) and Decreased coordination however improving, able to achieve symmetrical smile and improved coordination   -Lingual function WFL   -Velar function Symmetrical       Patient with 100% speech intelligibility upon assessment this date, reports occasional fatigue during speech tasks however notes this is improving  DNT swallowing at this time d/t patient allergies, it is recommended that she scheduled VFSS to further assess swallow function  Patient reports increased chewing success when compared to IE  Patient verbalized understanding  Patient return demonstrated independence for use of home program recommendations, and is recommended to be discharged to home program at this time   SLP re-trained in exercises this date, patient return demonstrated understanding  Goals        Short Term Goals:     1  Patient will utilize over-articulation 80% of the time while orally reading sentence/paragraph length material to facilitate increased speech intelligibility, to be achieved in 4-6 weeks  --GOAL MET    2  Speech clarity during 1:1 conversation will improve over 4 weeks using the trained compensatory strategies, to be achieved in 4-6 weeks  --GOAL MET    3  Patient will improve HLE/pharyngeal/OM strength and timing with use of HLE exercises and additional appropriate interventions in order to facilitate improved toleration of liquids and solids demonstrating 90% free of overt s/s of aspiration/penetration, to be achieved in 4-6 weeks  --HIGHEST LEVEL    4  Patient will demonstrate consistent use of compensatory strategies (e g , upright positioning, small bites/sips, slow rate, alternation of consistencies, multiple swallows, effortful swallow, chin tuck, head turn, etc ) with 90% accuracy to reduce risk of  penetration/aspiration during consumption of liquids/solids, to be achieved in 4-6 weeks  --GOAL MET    Long Term Goals:    1  Pt will tolerate least restrictive with no overt s/s of aspiration 90% of the time independently for use of compensatory strategies allowing for optimized hydration/nutrition --HIGHEST LEVEL    2  Patient will demonstrate 90% speech accuracy at a independent conversational level with use of trained compensatory strategies in order to improve communication accuracy within environment, to be achieved by discharge  --GOAL MET              Impressions/Recommendations    Impressions:   -Patient presents with mild oropharyngeal dysphagia, min dysarthria at this time, improving with use of compensatory strategies, reduced OM strength/rom/coordination impacting toleration of liquids/solids, impacting speech accuracy   Patient has demonstrated independence with home program recommendations, compensatory strategies and is appropriate for discharge at this time, patient in agreement  If/when patient schedules VFSS, we can address further if needed s/p VFSS recommendations  Patient in agreement       Recommendations:  -Patient would benefit from outpatient skilled Speech Therapy services : Speech/ language therapy and Dysphagia therapy    -Frequency: 1x weekly  -Duration: 1 week    -Intervention certification from: 3/41/1013  -Intervention certification to: 7/02/4859

## 2023-05-22 NOTE — TELEPHONE ENCOUNTER
Fax received from [address] [place] [address] stating patient was given a 30 day temporary supply of Repatha  Medication requires prior authorization for further coverage  Initiated prior [facility] via [facility]  Key: [id number]    Sent to plan

## 2023-05-23 ENCOUNTER — OFFICE VISIT (OUTPATIENT)
Dept: PHYSICAL THERAPY | Facility: REHABILITATION | Age: 63
End: 2023-05-23

## 2023-05-23 ENCOUNTER — APPOINTMENT (OUTPATIENT)
Dept: LAB | Facility: CLINIC | Age: 63
End: 2023-05-23

## 2023-05-23 DIAGNOSIS — I48.0 PAROXYSMAL ATRIAL FIBRILLATION (HCC): ICD-10-CM

## 2023-05-23 DIAGNOSIS — E53.8 DEFICIENCY OF OTHER SPECIFIED B GROUP VITAMINS: ICD-10-CM

## 2023-05-23 DIAGNOSIS — I10 BENIGN ESSENTIAL HYPERTENSION: ICD-10-CM

## 2023-05-23 DIAGNOSIS — R53.1 LEFT-SIDED WEAKNESS: ICD-10-CM

## 2023-05-23 DIAGNOSIS — I63.9 ACUTE CVA (CEREBROVASCULAR ACCIDENT) (HCC): Primary | ICD-10-CM

## 2023-05-23 DIAGNOSIS — R26.89 BALANCE PROBLEM: ICD-10-CM

## 2023-05-23 DIAGNOSIS — R79.9 ABNORMAL FINDING OF BLOOD CHEMISTRY, UNSPECIFIED: ICD-10-CM

## 2023-05-23 DIAGNOSIS — R20.2 PARESTHESIA: ICD-10-CM

## 2023-05-23 DIAGNOSIS — I73.9 PERIPHERAL VASCULAR DISEASE (HCC): ICD-10-CM

## 2023-05-23 DIAGNOSIS — I10 ESSENTIAL HYPERTENSION: ICD-10-CM

## 2023-05-23 DIAGNOSIS — K55.069 MESENTERIC ARTERY THROMBOSIS (HCC): ICD-10-CM

## 2023-05-23 DIAGNOSIS — R10.12 LEFT UPPER QUADRANT ABDOMINAL PAIN: ICD-10-CM

## 2023-05-23 LAB
25(OH)D3 SERPL-MCNC: 41.2 NG/ML (ref 30–100)
ALBUMIN SERPL BCP-MCNC: 4.1 G/DL (ref 3.5–5)
ALP SERPL-CCNC: 121 U/L (ref 46–116)
ALT SERPL W P-5'-P-CCNC: 21 U/L (ref 12–78)
ANION GAP SERPL CALCULATED.3IONS-SCNC: 4 MMOL/L (ref 4–13)
AST SERPL W P-5'-P-CCNC: 19 U/L (ref 5–45)
BASOPHILS # BLD AUTO: 0.06 THOUSANDS/ÂΜL (ref 0–0.1)
BASOPHILS NFR BLD AUTO: 1 % (ref 0–1)
BILIRUB SERPL-MCNC: 0.66 MG/DL (ref 0.2–1)
BUN SERPL-MCNC: 15 MG/DL (ref 5–25)
CALCIUM SERPL-MCNC: 9.3 MG/DL (ref 8.3–10.1)
CHLORIDE SERPL-SCNC: 102 MMOL/L (ref 96–108)
CHOLEST SERPL-MCNC: 220 MG/DL
CO2 SERPL-SCNC: 28 MMOL/L (ref 21–32)
CORTIS AM PEAK SERPL-MCNC: 11 UG/DL (ref 6.7–22.6)
CREAT SERPL-MCNC: 0.87 MG/DL (ref 0.6–1.3)
CRP SERPL QL: 11.4 MG/L
EOSINOPHIL # BLD AUTO: 0.18 THOUSAND/ÂΜL (ref 0–0.61)
EOSINOPHIL NFR BLD AUTO: 3 % (ref 0–6)
ERYTHROCYTE [DISTWIDTH] IN BLOOD BY AUTOMATED COUNT: 12.4 % (ref 11.6–15.1)
ERYTHROCYTE [SEDIMENTATION RATE] IN BLOOD: 15 MM/HOUR (ref 0–29)
EST. AVERAGE GLUCOSE BLD GHB EST-MCNC: 111 MG/DL
FOLATE SERPL-MCNC: >22.3 NG/ML
GFR SERPL CREATININE-BSD FRML MDRD: 70 ML/MIN/1.73SQ M
GLUCOSE P FAST SERPL-MCNC: 88 MG/DL (ref 65–99)
HBA1C MFR BLD: 5.5 %
HCT VFR BLD AUTO: 41.2 % (ref 34.8–46.1)
HDLC SERPL-MCNC: 58 MG/DL
HGB BLD-MCNC: 13.9 G/DL (ref 11.5–15.4)
IMM GRANULOCYTES # BLD AUTO: 0.05 THOUSAND/UL (ref 0–0.2)
IMM GRANULOCYTES NFR BLD AUTO: 1 % (ref 0–2)
LDLC SERPL CALC-MCNC: 111 MG/DL (ref 0–100)
LYMPHOCYTES # BLD AUTO: 1.9 THOUSANDS/ÂΜL (ref 0.6–4.47)
LYMPHOCYTES NFR BLD AUTO: 27 % (ref 14–44)
MCH RBC QN AUTO: 30.2 PG (ref 26.8–34.3)
MCHC RBC AUTO-ENTMCNC: 33.7 G/DL (ref 31.4–37.4)
MCV RBC AUTO: 89 FL (ref 82–98)
MONOCYTES # BLD AUTO: 0.51 THOUSAND/ÂΜL (ref 0.17–1.22)
MONOCYTES NFR BLD AUTO: 7 % (ref 4–12)
NEUTROPHILS # BLD AUTO: 4.47 THOUSANDS/ÂΜL (ref 1.85–7.62)
NEUTS SEG NFR BLD AUTO: 61 % (ref 43–75)
NONHDLC SERPL-MCNC: 162 MG/DL
NRBC BLD AUTO-RTO: 0 /100 WBCS
PLATELET # BLD AUTO: 250 THOUSANDS/UL (ref 149–390)
PMV BLD AUTO: 9.8 FL (ref 8.9–12.7)
POTASSIUM SERPL-SCNC: 3.8 MMOL/L (ref 3.5–5.3)
PROT SERPL-MCNC: 7.4 G/DL (ref 6.4–8.4)
RBC # BLD AUTO: 4.61 MILLION/UL (ref 3.81–5.12)
SODIUM SERPL-SCNC: 134 MMOL/L (ref 135–147)
TRIGL SERPL-MCNC: 257 MG/DL
TSH SERPL DL<=0.05 MIU/L-ACNC: 1.68 UIU/ML (ref 0.45–4.5)
VIT B12 SERPL-MCNC: 511 PG/ML (ref 180–914)
WBC # BLD AUTO: 7.17 THOUSAND/UL (ref 4.31–10.16)

## 2023-05-23 RX ORDER — METOPROLOL SUCCINATE 100 MG/1
100 TABLET, EXTENDED RELEASE ORAL 2 TIMES DAILY
Qty: 180 TABLET | Refills: 2 | Status: SHIPPED | OUTPATIENT
Start: 2023-05-23

## 2023-05-23 NOTE — PROGRESS NOTES
Daily Note         Name: Manisha Irwin  Date: 23  : 1960  Referring Provider: Kaylie Melvin DO  AUTHORIZATION:   Insurance: Payor: Nish RAMON REP / Plan: Nigel ESPARZA [address] / Product Type: Medicare HMO /   6 of 16 visits through , PN due       SUBJECTIVE: Reports UTI infection with pain with 3-4/10 pain today  HPI: Manisha Irwin is a 70 y o  female referred to outpatient physical therapy for the following diagnosis   Encounter Diagnoses   Name Primary? • Acute CVA (cerebrovascular accident) [facility]) Yes   • Left-sided weakness    • Balance problem          Patient-Specific Functional Scale   Task is scored 0 (unable to perform activity) to 10 (ability to perform activity independently)  Activity    1  Carry the laundry up the steps wihtout difficulty  3 8   2  Taking the garbage out  3 7   3  Feel comfortable walking outside without loss of balance  5 10        Patient goals: To get stronger and relieve some of the pain  OBJECTIVE:  BP: 122/86      ASSESSMENT:  Receiving treatment for UTI 2 days to go on Keflex  Continued with balance challenge and endurance training working towards her goals  Solostep utilized for balance challenge  Trial High intensity interval training with improved LE clearance on treadmill  She tolerated treatment well and will benefit from continued therapy      Precautions Precautions: a-fib, anxiety, HTN, COPD, closely monitor vital signs     Specialty Treatment Diary  4/21 5/9 5/15 5/23   Home exercise program       Walking/endurance Biodex  1 0 mph 1 min  1 5 mph 2 min  2 0 mph 7 min  10 min  +  Rest  2 0 mph x 5 min   6MWT    5xSTS  TUG   Scifit 10 min  65-75 stp    True treadmill  1 3 mph 2  Min  1 5 mph 3 min  2 UE support to 1 UE support Scifit  12 min 75-82 stp    True Treadmill  1 6 mph 2 min  1 9 mph 1 5 min  94 HR  2 4 1 min  1 8 1 min  2 4 1 min   Static and dynamic balance "Solostep    Compliant surface management ( object under for increased challenge)   circles x 4  Sideways    100' x 2  HT  HN  180 turns 2# weights  EC   Hurdles 10\"  4 laps         fwd         Sideways    Foam beam x 3 laps ea  Forwards  sideways     FGA  MCTSIB ECFoam    Foam beams 3x  FWD    Added 3 hurdles 3x FWD/Sideways  3x Foam beams  Forwards 3 laps  Sideways 3 laps  Added 10' hurdles with increased challenge  4 frw/4 sideways    100' x 3  180 degree turns    Backwards 100' x 1    Incline surface  Forwards/turn  Forwards/backwards * LOB 3 laps each    Incline forwards downs/step onto compliance surface/ steping down/return  6 laps Solostep    10\" hurdles   4 laps forwards  4 laps sideways    Added foam beam balance   3 laps fwd'/sdw   Strengthening nv  Sit to stand  5' x 1  #4 10' x 1  5' x 1 Sit to stand with 4# with  10 x 2   Stretching                PLAN OF CARE:  Patient will benefit from physical therapy 1-2 times per week for 4 more weeks per POC  Neuromuscular re-education, therapeutic exercises, and therapeutic activities as outlined in grids      Phillip Willi, PT  5/23/2023  "

## 2023-05-24 ENCOUNTER — TELEPHONE (OUTPATIENT)
Dept: VASCULAR SURGERY | Facility: CLINIC | Age: 63
End: 2023-05-24

## 2023-05-24 DIAGNOSIS — I10 BENIGN ESSENTIAL HYPERTENSION: ICD-10-CM

## 2023-05-24 RX ORDER — SPIRONOLACTONE 25 MG/1
TABLET ORAL
Qty: 180 TABLET | Refills: 0 | Status: SHIPPED | OUTPATIENT
Start: 2023-05-24

## 2023-05-24 NOTE — TELEPHONE ENCOUNTER
Patient called, she has a tender lump left side of neck near LCEA incision  It does not affect swallowing, eating , or drinking,, however it is bothersome to her all the time for the past month  Meantime, it seems to be worsening in tenderness    Will get patient an appt , but will send to triage first

## 2023-05-24 NOTE — TELEPHONE ENCOUNTER
Called patient to move up CV doppler  Left message    I have patient tentatively scheduled for 6/1/23, if she calls back we can confirm if this works for the patient

## 2023-05-24 NOTE — TELEPHONE ENCOUNTER
Chart reviewed  Patient is now almost 4 months S/p aborted L CEA 2/2 anatomy 1/31/23  \    Has this been evaluated by PCP? For other etiology? Low suspicion of being related to incision  Is there any redness?, drainage? Is it enlarging? Looks as though patient is due for carotid duplex and OV within the next month  Can try to move both of those up

## 2023-05-24 NOTE — TELEPHONE ENCOUNTER
Patient has doppler for 6/19 scheduled so I will ask call center to move up doppler and an ov      Meanwhile, I will ask paitent to check also her with PCP to rule out any other diagnosis

## 2023-05-24 NOTE — TELEPHONE ENCOUNTER
Fax received from [address] with favorable outcome  Claudette Ziegler approved until 12/31/2023  Scanned into chart

## 2023-05-24 NOTE — TELEPHONE ENCOUNTER
Fax received from [address] requiring additional information  They needs recent LDL results showing improvement on the medication  Patient had lipid panel done yesterday   Printed lipid panels from 02/21/2023 and 5/23/2023 and faxed with original request

## 2023-05-25 ENCOUNTER — OFFICE VISIT (OUTPATIENT)
Dept: FAMILY MEDICINE CLINIC | Facility: CLINIC | Age: 63
End: 2023-05-25

## 2023-05-25 ENCOUNTER — HOSPITAL ENCOUNTER (OUTPATIENT)
Dept: RADIOLOGY | Facility: HOSPITAL | Age: 63
Discharge: HOME | End: 2023-05-25

## 2023-05-25 ENCOUNTER — APPOINTMENT (OUTPATIENT)
Dept: PHYSICAL THERAPY | Facility: REHABILITATION | Age: 63
End: 2023-05-25
Payer: COMMERCIAL

## 2023-05-25 VITALS
HEART RATE: 68 BPM | BODY MASS INDEX: 32.23 KG/M2 | WEIGHT: 188.8 LBS | HEIGHT: 64 IN | DIASTOLIC BLOOD PRESSURE: 70 MMHG | OXYGEN SATURATION: 96 % | TEMPERATURE: 98.6 F | SYSTOLIC BLOOD PRESSURE: 126 MMHG

## 2023-05-25 DIAGNOSIS — K21.9 GASTROESOPHAGEAL REFLUX DISEASE WITHOUT ESOPHAGITIS: Primary | ICD-10-CM

## 2023-05-25 DIAGNOSIS — R10.31 RIGHT LOWER QUADRANT ABDOMINAL PAIN: ICD-10-CM

## 2023-05-25 DIAGNOSIS — R63.4 UNINTENTIONAL WEIGHT LOSS: ICD-10-CM

## 2023-05-25 DIAGNOSIS — F41.9 ANXIETY: ICD-10-CM

## 2023-05-25 DIAGNOSIS — I10 BENIGN ESSENTIAL HYPERTENSION: ICD-10-CM

## 2023-05-25 DIAGNOSIS — I48.0 PAROXYSMAL ATRIAL FIBRILLATION (HCC): ICD-10-CM

## 2023-05-25 DIAGNOSIS — R68.81 EARLY SATIETY: ICD-10-CM

## 2023-05-25 LAB
ALBUMIN SERPL ELPH-MCNC: 4.62 G/DL (ref 3.5–5)
ALBUMIN SERPL ELPH-MCNC: 63.3 % (ref 52–65)
ALPHA1 GLOB SERPL ELPH-MCNC: 0.41 G/DL (ref 0.1–0.4)
ALPHA1 GLOB SERPL ELPH-MCNC: 5.6 % (ref 2.5–5)
ALPHA2 GLOB SERPL ELPH-MCNC: 0.89 G/DL (ref 0.4–1.2)
ALPHA2 GLOB SERPL ELPH-MCNC: 12.2 % (ref 7–13)
BETA GLOB ABNORMAL SERPL ELPH-MCNC: 0.44 G/DL (ref 0.4–0.8)
BETA1 GLOB SERPL ELPH-MCNC: 6 % (ref 5–13)
BETA2 GLOB SERPL ELPH-MCNC: 4.3 % (ref 2–8)
BETA2+GAMMA GLOB SERPL ELPH-MCNC: 0.31 G/DL (ref 0.2–0.5)
GAMMA GLOB ABNORMAL SERPL ELPH-MCNC: 0.63 G/DL (ref 0.5–1.6)
GAMMA GLOB SERPL ELPH-MCNC: 8.6 % (ref 12–22)
IGG/ALB SER: 1.72 {RATIO} (ref 1.1–1.8)
PROT PATTERN SERPL ELPH-IMP: ABNORMAL
PROT SERPL-MCNC: 7.3 G/DL (ref 6.4–8.2)

## 2023-05-25 RX ORDER — LORAZEPAM 1 MG/1
1 TABLET ORAL EVERY 6 HOURS PRN
Qty: 90 TABLET | Refills: 0 | Status: SHIPPED | OUTPATIENT
Start: 2023-05-25

## 2023-05-25 RX ADMIN — IOHEXOL 100 ML: 350 INJECTION, SOLUTION INTRAVENOUS at 07:48

## 2023-05-25 NOTE — TELEPHONE ENCOUNTER
Spoke with pt  She stated that she does have an OV scheduled with her PCP today  Transferred to the Call Center to move up the appointment for CV and OV

## 2023-05-25 NOTE — PROGRESS NOTES
Assessment/Plan:      Diagnoses and all orders for this visit:    Gastroesophageal reflux disease without esophagitis    Benign essential hypertension    Paroxysmal atrial fibrillation (HCC)    Anxiety  -     LORazepam (ATIVAN) 1 mg tablet; Take 1 tablet (1 mg total) by mouth every 6 (six) hours as needed for anxiety          Subjective:     Patient ID: Orlin Daugherty is a 70 y o  female  Patient presents with:  Mass: Patient states she has a mass under the left side of her jaw since 01/31/2023  Patient states it has been growing for the past month and it is numb  Diarrhea: Pt states last night she was having diarrhea, nausea, and is currently having a headache  Nausea  Headache          Review of Systems   Constitutional: Negative for fever  As noted in HPI  HENT: Negative  Respiratory: Negative for cough, shortness of breath and wheezing  Cardiovascular: Negative  Gastrointestinal: Positive for diarrhea and nausea  Musculoskeletal: Positive for neck pain  Neurological: Positive for headaches  Objective:     Physical Exam  Vitals and nursing note reviewed  Constitutional:       Appearance: She is well-developed  HENT:      Head: Normocephalic and atraumatic  Eyes:      Pupils: Pupils are equal, round, and reactive to light  Neck:      Vascular: No JVD  Cardiovascular:      Rate and Rhythm: Normal rate and regular rhythm  Pulmonary:      Effort: Pulmonary effort is normal    Abdominal:      Palpations: Abdomen is soft  Musculoskeletal:      Cervical back: Normal range of motion and neck supple  Tenderness present  No rigidity  Lymphadenopathy:      Cervical: No cervical adenopathy  Neurological:      Mental Status: She is alert and oriented to person, place, and time

## 2023-05-26 DIAGNOSIS — N30.10 INTERSTITIAL CYSTITIS: ICD-10-CM

## 2023-05-26 LAB — VIT B6 SERPL-MCNC: 8.2 UG/L (ref 3.4–65.2)

## 2023-05-26 RX ORDER — HYDROXYZINE HYDROCHLORIDE 25 MG/1
TABLET, FILM COATED ORAL
Qty: 90 TABLET | Refills: 0 | Status: SHIPPED | OUTPATIENT
Start: 2023-05-26

## 2023-05-27 LAB — VIT B1 BLD-SCNC: 162.8 NMOL/L (ref 66.5–200)

## 2023-05-30 ENCOUNTER — APPOINTMENT (OUTPATIENT)
Dept: SPEECH THERAPY | Facility: REHABILITATION | Age: 63
End: 2023-05-30
Payer: COMMERCIAL

## 2023-05-30 DIAGNOSIS — M54.2 NECK PAIN: ICD-10-CM

## 2023-05-30 DIAGNOSIS — R10.12 LEFT UPPER QUADRANT ABDOMINAL PAIN: ICD-10-CM

## 2023-05-30 DIAGNOSIS — G43.001 MIGRAINE WITHOUT AURA AND WITH STATUS MIGRAINOSUS, NOT INTRACTABLE: ICD-10-CM

## 2023-05-30 RX ORDER — BUTALBITAL, ACETAMINOPHEN AND CAFFEINE 50; 325; 40 MG/1; MG/1; MG/1
TABLET ORAL
Qty: 90 TABLET | Refills: 1 | Status: SHIPPED | OUTPATIENT
Start: 2023-05-30

## 2023-05-31 ENCOUNTER — TELEPHONE (OUTPATIENT)
Dept: FAMILY MEDICINE CLINIC | Facility: CLINIC | Age: 63
End: 2023-05-31

## 2023-05-31 RX ORDER — TRAMADOL HYDROCHLORIDE 50 MG/1
100 TABLET ORAL EVERY 8 HOURS PRN
Qty: 15 TABLET | Refills: 0 | Status: SHIPPED | OUTPATIENT
Start: 2023-05-31 | End: 2023-06-02 | Stop reason: SDUPTHER

## 2023-05-31 RX ORDER — CHLORDIAZEPOXIDE HYDROCHLORIDE AND CLIDINIUM BROMIDE 5; 2.5 MG/1; MG/1
1 CAPSULE ORAL
Qty: 120 CAPSULE | Refills: 0 | OUTPATIENT
Start: 2023-05-31

## 2023-05-31 NOTE — TELEPHONE ENCOUNTER
Pt called stating her Librax was not sent to [facility] in [employer]  It was sent in as no print so it never got to the pharmacy  I did call it in per the approval for refill on 5/8/23

## 2023-06-01 ENCOUNTER — HOSPITAL ENCOUNTER (OUTPATIENT)
Dept: NON INVASIVE DIAGNOSTICS | Facility: HOSPITAL | Age: 63
Discharge: HOME | End: 2023-06-01
Attending: SURGERY

## 2023-06-01 DIAGNOSIS — I65.22 ASYMPTOMATIC CAROTID ARTERY STENOSIS WITHOUT INFARCTION, LEFT: ICD-10-CM

## 2023-06-02 ENCOUNTER — OFFICE VISIT (OUTPATIENT)
Dept: VASCULAR SURGERY | Facility: CLINIC | Age: 63
End: 2023-06-02

## 2023-06-02 VITALS
SYSTOLIC BLOOD PRESSURE: 146 MMHG | OXYGEN SATURATION: 97 % | BODY MASS INDEX: 32.61 KG/M2 | HEART RATE: 60 BPM | DIASTOLIC BLOOD PRESSURE: 94 MMHG | WEIGHT: 191 LBS | HEIGHT: 64 IN

## 2023-06-02 DIAGNOSIS — I65.22 ASYMPTOMATIC CAROTID ARTERY STENOSIS WITHOUT INFARCTION, LEFT: Primary | ICD-10-CM

## 2023-06-02 DIAGNOSIS — I65.29 STENOSIS OF CAROTID ARTERY, UNSPECIFIED LATERALITY: ICD-10-CM

## 2023-06-02 NOTE — PATIENT INSTRUCTIONS
-Continue to take aspirin 81 mg, eliquis daily as prescribed  -Go to the ED with any signs or symptoms of stroke such as numbness/ tingling on one side of your body, blindness in one eye, slurred speech, facial droop     -Increase physical activity  Try to exercise 3 times a week for 30 min      -Complete carotid ultrasound in 3 months and return to the office for review with your surgeon

## 2023-06-02 NOTE — ASSESSMENT & PLAN NOTE
68-year-old female s/p aborted L CEA on 1/31/23 with Dr Gillette returns to the office for L neck incisional pain and swelling  Patient states that she was having left incisional pain 2 weeks ago and was seen by her PCP, however this swelling and pain has since resolved  She points to an area at the mid incision line which she states she feels a small lump  Upon assessment there is a less than 0 2 cm area of scar tissue at the area of concern  No abscess erythema, swelling appreciated at the surgical incision site  See Clinical images      -Patient noted to have improvement of left-sided facial droop which appears resolved in the office today  She has symmetrical smile, midline tongue and equal strength bilaterally  -Denies symptoms of numbness/ tingling/ weakness on one side of the body, facial droop, slurred speech or blindness in one eye  Reviewed carotid ultrasound from 6/1/2023 which demonstrates right ICA less than 50% stenosis velocities 40/14 and a ratio of 0 56  Left ICA 70 to 99% stenosis with velocities 328/96 and a ratio 4 39  Discussed that recent carotid ultrasound demonstrates no changes from previous study and continues to monitor carotid stenosis with ultrasound and return to the office for review with Dr Gillette  - Continue with medical management at this time  Continue to take aspirin and Eliquis daily  Patient states she is unable to tolerate statin and has severe muscle cramps  - Complete carotid study in 3 months and return to the office for review with Dr Gillette

## 2023-06-02 NOTE — PROGRESS NOTES
Assessment/Plan:    Asymptomatic stenosis of left carotid artery  68-year-old female s/p aborted L CEA on 1/31/23 with Dr Gillette returns to the office for L neck incisional pain and swelling  Patient states that she was having left incisional pain 2 weeks ago and was seen by her PCP, however this swelling and pain has since resolved  She points to an area at the mid incision line which she states she feels a small lump  Upon assessment there is a less than 0 2 cm area of scar tissue at the area of concern  No abscess erythema, swelling appreciated at the surgical incision site  See Clinical images      -Patient noted to have improvement of left-sided facial droop which appears resolved in the office today  She has symmetrical smile, midline tongue and equal strength bilaterally  -Denies symptoms of numbness/ tingling/ weakness on one side of the body, facial droop, slurred speech or blindness in one eye  Reviewed carotid ultrasound from 6/1/2023 which demonstrates right ICA less than 50% stenosis velocities 40/14 and a ratio of 0 56  Left ICA 70 to 99% stenosis with velocities 328/96 and a ratio 4 39  Discussed that recent carotid ultrasound demonstrates no changes from previous study and continues to monitor carotid stenosis with ultrasound and return to the office for review with Dr Gillette  - Continue with medical management at this time  Continue to take aspirin and Eliquis daily  Patient states she is unable to tolerate statin and has severe muscle cramps  - Complete carotid study in 3 months and return to the office for review with Dr Gillette  Diagnoses and all orders for this visit:    Asymptomatic carotid artery stenosis without infarction, left  -     VAS carotid complete study; Future    Stenosis of carotid artery, unspecified laterality  -     VAS carotid complete study; Future          Subjective:      Patient ID: Yandy Parker is a 70 y o  female      Patient is here today to review results of a CV done 6/1/2023  Patient is s/p exploration of left carotid artery - aborted CEA done 1/31/2023 by Dr Radha Tang  She is also s/p SMA stent placement August/2021 done OSH  Patient has felt a small, pea size lump in the old CEA incision  She denies any pain  Since calling our office the size of the lump has decreased  Patient c/o daily headaches and occasional dizziness  Patient denies any sudden loss of vision, trouble swallowing, slurred speech or any other TIA or Stroke symptoms  Patient is a former smoker  68-year-old female with a past medical history of hypertension, hyperlipidemia, paroxysmal A-fib, CAD, CAMILLE, SMA thrombosis status post CLARISA at [facility], obesity, history of TIA, asymptomatic left ICA stenosis patient is status post CEA on 1/31/23 with Dr Radha Tang which was aborted due to 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  Patient reports that she has been receiving physical therapy and speech therapy weekly  She states that her left-sided facial droop is resolved and continues to have minor left-sided weakness in her left hand and leg   -States she started having pain in her L neck incision 2 weeks ago that was worsening and was seen by her PCP  She states the incision site had some swelling in the area about the size of a pea  The area is numb and has the feeling of pins and needles  She states the swelling has decreased since being seen by PCP and is now resolved  The numbness is still persistent at the area surrounding her carotid incision    -She is receiving PT for her L arm and L leg weakness she states she has had after the carotid surgery, she states this has had improvement over time with physical therapy  She will be getting an EMG in December  She states that when she swallows she has a sensation of piece of food sitting in her throat  She has a history of SMA stent at [facility] in 8/19/21   She admits to some abdominal pain after she "eats in her R lower quadrant, she states she, she she does not have food fear but is cautious  Symmetrical smile, Tongue has minor deviation to the left  She is taking aspirin, eliquis daily  The following portions of the patient's history were reviewed and updated as appropriate: allergies, current medications, past family history, past medical history, past social history, past surgical history and problem list     Review of Systems   Constitutional: Negative  HENT: Negative  Eyes: Negative  Respiratory: Negative  Negative for shortness of breath  Cardiovascular: Negative  Negative for chest pain  Gastrointestinal: Negative  Endocrine: Negative  Genitourinary: Negative  Musculoskeletal: Negative  Skin: Negative  Allergic/Immunologic: Negative  Neurological: Positive for dizziness and headaches  Hematological: Negative  Psychiatric/Behavioral: Negative  Objective:      /94 (BP Location: Left arm, Patient Position: Sitting, Cuff Size: Standard)   Pulse 60   Ht 5' 4\" (1 626 m)   Wt 86 6 kg (191 lb)   LMP  (LMP Unknown)   SpO2 97%   BMI 32 79 kg/m²          Physical Exam  Vitals and nursing note reviewed  Constitutional:       Appearance: Normal appearance  HENT:      Head: Normocephalic and atraumatic  Cardiovascular:      Rate and Rhythm: Normal rate  Pulmonary:      Effort: Pulmonary effort is normal       Breath sounds: Normal breath sounds  Musculoskeletal:      Right lower leg: No edema  Left lower leg: No edema  Skin:     General: Skin is warm and dry  Neurological:      General: No focal deficit present  Mental Status: She is alert and oriented to person, place, and time  Sensory: Sensory deficit present  Comments: Symmetrical smile, midline tongue, equal UE strength      Psychiatric:         Mood and Affect: Mood normal          Behavior: Behavior normal            I have reviewed and made appropriate " "changes to the review of systems input by the medical assistant  Vitals:    06/02/23 1118   BP: 146/94   BP Location: Left arm   Patient Position: Sitting   Cuff Size: Standard   Pulse: 60   SpO2: 97%   Weight: 86 6 kg (191 lb)   Height: 5' 4\" (1 626 m)       Patient Active Problem List   Diagnosis   • Acute upper respiratory infection   • Allergic rhinitis   • Angina pectoris (HCC)   • Anxiety and depression   • Arthritis   • Asthma due to seasonal allergies   • Attention disturbance   • Benign essential hypertension   • Interstitial cystitis   • Chronic low back pain   • Familial hyperlipidemia   • Elevated antinuclear antibody (SON) level   • Elevated blood sugar   • Essential hypertension   • Female pelvic pain   • Hyperlipidemia   • Lipid disorder   • Migraines   • Obesity (BMI 30-39  9)   • Tick bite   • Tobacco abuse   • Venous insufficiency   • Anxiety   • Insomnia   • Snoring   • Cervicalgia   • Headache   • AMD (age-related macular degeneration), bilateral   • Allergic reaction   • Palpitations and chest pain     • Intertrigo   • Acute gastric ulcer without hemorrhage or perforation   • Tinea corporis   • Gastroesophageal reflux disease without esophagitis   • Allergic conjunctivitis of both eyes   • Intrinsic atopic dermatitis   • Angio-edema   • Vasomotor rhinitis   • Other chest pain   • Low TSH level   • Paroxysmal atrial fibrillation (HCC)   • Vaginal candidiasis   • WELL[place] (dyspnea on exertion)   • Hair loss   • Patellar tendinitis of left knee   • Injury of toe on right foot   • Wheezing   • Lumbar radiculopathy   • Chronic fatigue   • Acute pyelonephritis   • CAMILLE (obstructive sleep apnea)   • Hypertensive heart disease with congestive heart failure (HCC)   • Shortness of breath   • Unspecified diastolic (congestive) heart failure (HCC)   • Vitamin B12 deficiency   • Left upper quadrant pain   • Diverticulitis   • 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HCC)   • Hypoxia   • Chronic bronchitis, unspecified " chronic bronchitis type ([facility] )   • Left arm pain   • Hematoma   • Continuous opioid dependence (HCC)   • COPD (chronic obstructive pulmonary disease) ([facility])   • Moderate persistent asthma without complication   • Urinary retention   • Peripheral vascular disease ([facility] )   • Appendix disease   • S/P appendectomy   • Pain of upper abdomen   • Left upper quadrant abdominal pain   • Recurrent UTI   • Stroke-like symptoms   • Localized swelling of left lower extremity   • Carotid artery stenosis   • Stenosis of left carotid artery   • Asymptomatic stenosis of left carotid artery   • Premature atrial contractions   • Lower abdominal pain   • Hepatic steatosis   • Partial facial nerve palsy   • Morbid obesity (HCC)   • Left lower quadrant abdominal pain   • Numbness   • Facial droop   • Left facial numbness   • Speech disorder   • Injury of left facial nerve   • Paresthesia       Past Surgical History:   Procedure Laterality Date   • COLONOSCOPY     • DILATION AND CURETTAGE OF UTERUS     • EGD     • EXPLORATORY LAPAROTOMY      for infertility   • HAND SURGERY      Hand excision of tendon cyst   • MA LAPAROSCOPIC APPENDECTOMY N/A 05/03/2022    Procedure: APPENDECTOMY LAPAROSCOPIC;  Surgeon:  Stephon Pizarro MD;  Location: [facility];  Service: General   • MA TEAEC W/PATCH GRF CAROTID VERTB Kyriebury Left 1/31/2023    Procedure: EXPLORATION OF LEFT CAROTID ARTERY; ABORTED ENDARTERECTOMY ARTERY CAROTID;  Surgeon: Brannon Mendiola DO;  Location: [facility];  Service: Vascular   • SUPERIOR MESTENTERIC ARTERY STENT     • WISDOM TOOTH EXTRACTION         Family History   Problem Relation Age of Onset   • Lung cancer Mother 61   • Brain cancer Mother 61   • Diabetes Father    • Depression Father    • Other Father         septic   • Allergies Sister    • Hashimoto's thyroiditis Sister    • Abdominal aortic aneurysm Sister    • Diabetes Sister    • No Known Problems Sister    • No Known Problems Maternal Grandmother    • No Known Problems Maternal Grandfather    • No Known Problems Paternal Grandmother    • No Known Problems Paternal Grandfather    • Hodgkin's lymphoma Brother    • No Known Problems Brother    • No Known Problems Maternal Aunt    • Diabetes Other        Social History     Socioeconomic History   • Marital status:      Spouse name: Not on file   • Number of children: 0   • Years of education: Not on file   • Highest education level: Not on file   Occupational History   • Occupation: unemployed   Tobacco Use   • Smoking status: Former     Packs/day: 0 50     Years: 10 00     Total pack years: 5 00     Types: Cigarettes     Quit date:      Years since quittin 4     Passive exposure: Never   • Smokeless tobacco: Never   Vaping Use   • Vaping Use: Never used   Substance and Sexual Activity   • Alcohol use: Never   • Drug use: No   • Sexual activity: Not on file     Comment: defer   Other Topics Concern   • Not on file   Social History Narrative    Who lives in your home: Alone     What type of home do you live in: Apartment     Age of your home: 1952 built     How long have you been living there: 5 yrs     Type of heat: forced hot air     Type of fuel: electric     What type of jamin is in your bedroom: carpet jamin     Do you have the following in or near your home:    Air products: Humidifier in the bedroom/kitchen     Pests: none     Pets: none     Are pets allowed in bedroom: N/A     Open fields, wooded areas nearby: No     Basement: [id number] mold     Exposure to second hand smoke: No    Central air     Habits:    Caffeine: Hot tea 1 cup daily- ice tea 1 cup in the afternoon    Chocolate: Occasionally      Social Determinants of Health     Financial Resource Strain: Low Risk  (2021)    Overall Financial Resource Strain (CARDIA)    • Difficulty of Paying Living Expenses: Not hard at all   Food Insecurity: No Food Insecurity (2023)    Hunger Vital Sign    • Worried About Running Out of Food in the Last Year: Never true    • Ran Out of Food in the Last Year: Never true   Transportation Needs: No Transportation Needs (2/21/2023)    PRAPARE - Transportation    • Lack of Transportation (Medical): No    • Lack of Transportation (Non-Medical): No   Physical Activity: Inactive (5/24/2021)    Exercise Vital Sign    • Days of Exercise per Week: 0 days    • Minutes of Exercise per Session: 0 min   Stress: Not on file   Social Connections: Moderately Isolated (5/24/2021)    Social Connection and Isolation Panel [NHANES]    • Frequency of Communication with Friends and Family: More than three times a week    • Frequency of Social Gatherings with Friends and Family: More than three times a week    • Attends [employer] Services: 1 to 4 times per year    • Active Member of Clubs or Organizations: No    • Attends Club or Organization Meetings: Never    • Marital Status: Never    Intimate Partner Violence: Not At Risk (5/24/2021)    Humiliation, Afraid, Rape, and Kick questionnaire    • Fear of Current or Ex-Partner: No    • Emotionally Abused: No    • Physically Abused: No    • Sexually Abused: No   Housing Stability: [address]  (2/21/2023)    Housing Stability Vital Sign    • Unable to Pay for Housing in the Last Year: No    • Number of Places Lived in the Last Year: 1    • Unstable Housing in the Last Year: No       Allergies   Allergen Reactions   • Ace Inhibitors Angioedema and Anaphylaxis     Anaphylaxis   • Benicar [Olmesartan] Angioedema     See Allergy note from 9/11/2008  Swollen ankles/legs   • Hydralazine Other (See Comments)     Lip swelling  Flank pain   • Other Anaphylaxis and Other (See Comments)     Other reaction(s): angioadema  Other reaction(s):  Other (See Comments)  Preservatives- itching throat closes, hi  Other reaction(s): angioadema  E Z Cat - hives throat closes itching,  Preservatives- itching throat closes, hi  Artificial sweeteners   • Valsartan Angioedema     Lips, face "swollen   • Sulfa Antibiotics Hives     stuffiness,itching,hives,throat closing--per pt \"sometimes able to take depending on the brand and the filler or possibly preservatives in it\"   • Ampicillin Hives     Depends on brand some preservatives can react  Has recently tolerated oral amoxicillin     • Motrin [Ibuprofen] Other (See Comments)     Per pt \" told not to take due to A Fib\"   • Wound Dressing Adhesive Other (See Comments)     Per pt Adhesive on EKG leads caused redness         Current Outpatient Medications:   •  Ascorbic Acid (vitamin C) 1000 MG tablet, Take 1,000 mg by mouth daily, Disp: , Rfl:   •  aspirin 81 mg chewable tablet, Chew 1 tablet (81 mg total) daily, Disp: , Rfl: 0  •  Biotin w/ Vitamins C & E (HAIR/SKIN/NAILS PO), Take by mouth, Disp: , Rfl:   •  butalbital-acetaminophen-caffeine (FIORICET,ESGIC) -40 mg per tablet, TAKE ONE TABLET BY MOUTH EVERY 6 HOURS AS NEEDED FOR HEADACHES , Disp: 90 tablet, Rfl: 1  •  chlordiazepoxide-clidinium (LIBRAX) 5-2 5 mg per capsule, Take 1 capsule by mouth 4 (four) times a day (before meals and at bedtime), Disp: 120 capsule, Rfl: 5  •  Cholecalciferol 25 MCG (1000 UT) tablet, Take 1,000 Units by mouth daily, Disp: , Rfl:   •  clotrimazole-betamethasone (LOTRISONE) 1-0 05 % cream, , Disp: , Rfl:   •  diltiazem (DILACOR XR) 240 MG 24 hr capsule, Take 1 capsule (240 mg total) by mouth daily, Disp: 90 capsule, Rfl: 3  •  Docusate Sodium (COLACE PO), Take by mouth daily at bedtime, Disp: , Rfl:   •  Eliquis 5 MG, TAKE ONE TABLET BY MOUTH TWICE DAILY, Disp: 180 tablet, Rfl: 0  •  famotidine (PEPCID) 20 mg tablet, Take 20 mg by mouth daily at bedtime, Disp: , Rfl:   •  fexofenadine (ALLEGRA) 180 MG tablet, Take 180 mg by mouth daily, Disp: , Rfl:   •  fluticasone-vilanterol (BREO ELLIPTA) 200-25 MCG/INH inhaler, Inhale 1 puff daily Rinse mouth after use , Disp: 3 Inhaler, Rfl: 3  •  furosemide (LASIX) 40 mg tablet, TAKE ONE TABLET BY MOUTH ONCE DAILY, Disp: 90 " tablet, Rfl: 0  •  Galcanezumab-gnlm 120 MG/ML SOAJ, Inject 120 mg under the skin every 30 (thirty) days, Disp: 1 mL, Rfl: 11  •  hydrOXYzine HCL (ATARAX) 25 mg tablet, TAKE TWO TABLETS BY MOUTH DAILY AT BEDTIME, Disp: 90 tablet, Rfl: 0  •  LORazepam (ATIVAN) 1 mg tablet, Take 1 tablet (1 mg total) by mouth every 6 (six) hours as needed for anxiety, Disp: 90 tablet, Rfl: 0  •  metoprolol succinate (TOPROL-XL) 100 mg 24 hr tablet, Take 1 tablet (100 mg total) by mouth 2 (two) times a day, Disp: 180 tablet, Rfl: 2  •  montelukast (SINGULAIR) 10 mg tablet, TAKE ONE TABLET BY MOUTH DAILY AT BEDTIME, Disp: 90 tablet, Rfl: 0  •  MULTIPLE VITAMIN PO, Take by mouth, Disp: , Rfl:   •  omeprazole (PriLOSEC) 40 MG capsule, Take 1 capsule (40 mg total) by mouth daily, Disp: 309 capsule, Rfl: 4  •  ondansetron (Zofran ODT) 4 mg disintegrating tablet, Take 1 tablet (4 mg total) by mouth every 6 (six) hours as needed for nausea or vomiting, Disp: 60 tablet, Rfl: 0  •  phenazopyridine (PYRIDIUM) 200 mg tablet, Take 1 tablet (200 mg total) by mouth 3 (three) times a day as needed for bladder spasms Not to be used for more than 2 consecutive days  , Disp: 30 tablet, Rfl: 0  •  Probiotic Product (PROBIOTIC-10 PO), Take 1 tablet by mouth in the morning, Disp: , Rfl:   •  promethazine (PHENERGAN) 25 mg tablet, Take 1 tablet (25 mg total) by mouth every 6 (six) hours as needed for nausea or vomiting, Disp: 60 tablet, Rfl: 1  •  Repatha SureClick 518 MG/ML SOAJ, Inject 140mg under the skin every 2 weeks  , Disp: 2 mL, Rfl: 11  •  spironolactone (ALDACTONE) 25 mg tablet, TAKE ONE TABLET BY MOUTH TWICE DAILY, Disp: 180 tablet, Rfl: 0  •  Turmeric 500 MG CAPS, , Disp: , Rfl:   •  Ubrogepant (UBRELVY) 100 MG tablet, Take 1 tablet (100 mg) one time as needed for migraine  May repeat one additional tablet (100 mg) at least two hours after the first dose   Do not use more than two doses per day, Disp: 16 tablet, Rfl: 11  •  Zinc 100 MG TABS, Take by mouth daily, Disp: , Rfl:   •  Menaquinone-7 (VITAMIN K2 PO), Take by mouth (Patient not taking: Reported on 5/25/2023), Disp: , Rfl:   •  naloxone (NARCAN) 4 mg/0 1 mL nasal spray, Administer 1 spray into a nostril  If no response after 2-3 minutes, give another dose in the other nostril using a new spray  (Patient not taking: Reported on 3/28/2023), Disp: 1 each, Rfl: 1  •  traMADol (Ultram) 50 mg tablet, Take 2 tablets (100 mg total) by mouth every 8 (eight) hours as needed for moderate pain, Disp: 60 tablet, Rfl: 0    Current Facility-Administered Medications:   •  cyanocobalamin injection 1,000 mcg, 1,000 mcg, Intramuscular, Q30 Days, Julita Shanks DO, 1,000 mcg at 11/28/22 1017  I have spent a total time of 30 minutes on 06/02/23 in caring for this patient including Diagnostic results, Instructions for management, Patient and family education, Counseling / Coordination of care, Documenting in the medical record and Obtaining or reviewing history

## 2023-06-05 ENCOUNTER — TELEPHONE (OUTPATIENT)
Dept: NEUROLOGY | Facility: CLINIC | Age: 63
End: 2023-06-05

## 2023-06-05 ENCOUNTER — APPOINTMENT (OUTPATIENT)
Dept: PHYSICAL THERAPY | Facility: REHABILITATION | Age: 63
End: 2023-06-05
Payer: COMMERCIAL

## 2023-06-05 ENCOUNTER — OFFICE VISIT (OUTPATIENT)
Dept: CARDIOLOGY CLINIC | Facility: CLINIC | Age: 63
End: 2023-06-05
Payer: COMMERCIAL

## 2023-06-05 VITALS
WEIGHT: 190 LBS | DIASTOLIC BLOOD PRESSURE: 73 MMHG | HEART RATE: 59 BPM | SYSTOLIC BLOOD PRESSURE: 113 MMHG | BODY MASS INDEX: 32.61 KG/M2

## 2023-06-05 DIAGNOSIS — G43.709 CHRONIC MIGRAINE WITHOUT AURA WITHOUT STATUS MIGRAINOSUS, NOT INTRACTABLE: Primary | ICD-10-CM

## 2023-06-05 DIAGNOSIS — I48.0 PAROXYSMAL ATRIAL FIBRILLATION (HCC): Primary | ICD-10-CM

## 2023-06-05 DIAGNOSIS — I10 BENIGN ESSENTIAL HTN: ICD-10-CM

## 2023-06-05 DIAGNOSIS — E78.5 DYSLIPIDEMIA: ICD-10-CM

## 2023-06-05 PROCEDURE — 99214 OFFICE O/P EST MOD 30 MIN: CPT | Performed by: INTERNAL MEDICINE

## 2023-06-05 RX ORDER — DEXAMETHASONE 4 MG/1
TABLET ORAL
Qty: 6 TABLET | Refills: 0 | Status: SHIPPED | OUTPATIENT
Start: 2023-06-05 | End: 2023-06-28 | Stop reason: SDUPTHER

## 2023-06-05 NOTE — PROGRESS NOTES
"      Cardiology             Temo Chacon  1960  [id number]                 Assessment/Plan:     Paroxysmal atrial fibrillation, diagnosed on Holter monitor 03/2020  Hypertension  Multiple drug intolerances and possible allergies  Probable heterozygous familial hypercholesterolemia, on Repatha  Obesity   Sleep apnea, compliant with CPAP therapy   Lower extremity edema, on furosemide, resolved              Heart rate well controlled, continue diltiazem 240 mg daily and metoprolol succinate 100 mg twice daily  Continue Eliquis anticoagulation, tolerating well  Euvolemic by examination, continue furosemide 40 mg daily  Absence of lower extremity edema today's exam  Blood pressure controlled today, which historically has been difficult to control as she has reported intolerances to multiple medications    HCTZ has caused \"throat closing,\" atorvastatin has caused joint pains, valsartan has caused reported angioedema, hydralazine has caused \"kidney pain,\" nifedipine caused headache and nausea, verapamil cause increase in palpitations                    Follow-up with me in 6 months              Interval History:      This is a 78-year-old female diagnosed with paroxysmal atrial fibrillation on Holter monitoring 3/020   That time echocardiogram had revealed mild-to-moderate aortic regurgitation with normal left ventricular systolic function   Nuclear stress test June 2020 was unremarkable with normal perfusion      She has hypertension, and has multiple medications listed as allergies including beta-blockers, although she has been taking metoprolol for quite some time   Although on her allergy list HCTZ states “throat closing,\" she states that actually cause some ankle swelling along with amlodipine   Atorvastatin caused joint pain, although it is listed in her allergies also as “throat closing  ”  She seems to have had a true allergic reaction to hydralazine and valsartan   She states valsartan caused angioedema, " and she does not remember what happened with hydralazine, although states she thinks it may have been ankle swelling      She was last seen by Dr Kushal Valdez 01/28/2021 at which time she was maintained on metoprolol succinate and spironolactone for hypertension   At 1 point she was asked to take diltiazem as needed for palpitations  Edna Causey has been maintained on Eliquis anticoagulation        She underwent a Zio monitor 03/2021 at [facility](should be scanned into media section, personally reviewed rhythm strips) revealing normal sinus rhythm with 8 runs of SVT, longest lasting 17 beats   There were 18 patient triggers, most of which correlated with PACs, short atrial runs, and PVCs  Park Orantes was no evidence of atrial fibrillation      Nuclear stress test 06/2020 was unremarkable     In August 2021 she was hospitalized at [facility] for epigastric and left upper quadrant pain, admitted for superior mesenteric artery thrombosis   She received heparin drip, and underwent SMA stenting with IR on 08/19/2021   She was initiated on Plavix, and continued on Eliquis      During her prior visit 10/15/2021 she was initiated on diltiazem which she later stopped due to headache and nausea   Subsequent nifedipine was tried which caused headache and nausea as well which she stopped on her own   Verapamil caused increase in palpitations      She went to [facility] ER due to urinary retention and was reported to be in atrial fibrillation  ZIO monitor 10/2022 revealed episodes of paroxysmal atrial fibrillation which correlated with her triggered symptoms  Echocardiogram 12/16/2022 demonstrated normal left ejection fraction of 55% with mild mitral and aortic regurgitation  Nuclear stress test 1/17/2023 demonstrated no evidence of myocardial ischemia  She was seen by electrophysiology and was initiated on flecainide which she did not tolerate well due to side effects    She refused ablation, therefore was placed on "diltiazem      On 1/31/2023 she underwent exploration of left carotid artery for CEA, and this was aborted, as the left carotid bifurcation was high and deep in the neck with the hypoglossal nerve overriding the bifurcation  She went back to the hospital 2/2023 with a left facial droop, 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, and she was discharged      During her prior visit 3/20/2023, diltiazem was increased from 120 mg to 180 mg daily in light of recurrent atrial fibrillation with RVR at 111 bpm   She refused another consultation with electrophysiology  She was unable to tolerate flecainide due to increased palpitations, therefore was reluctant to try any more medications and refused ablation as well  She presents today for follow-up with no complaints  She feels well without exertional chest pain, shortness of breath, dizziness, palpitations, lower extreme edema          Vitals:  Vitals:    06/05/23 1311   BP: 113/73   BP Location: Left arm   Patient Position: Sitting   Cuff Size: Large   Pulse: 59   Weight: 86 2 kg (190 lb)         Past Medical History:   Diagnosis Date   • A-fib ([facility] ) 03/2020   • Allergic    • Anxiety    • Arthritis    • Asthma    • Back pain     and muscle pain   • Bruises easily    • Chronic pain disorder     Interstitial pain   • Colon polyp    • Dental bridge present    • Depression    • Eczema    • GERD (gastroesophageal reflux disease)    • Heart murmur    • History of blood clots     per pt \"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\"   • History of pneumonia    • Hives    • Hyperlipidemia    • Hypertension    • Infertility, female    • Interstitial cystitis    • Migraine     sees neurologist   • Motion sickness    • Palpitations    • PONV (postoperative nausea and vomiting)    • Psychiatric disorder    • TIA (transient ischemic attack) 09/2022    per pt --\"had MRI and 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\"   • Urinary incontinence     wears Pads   • " Wears glasses      Social History     Socioeconomic History   • Marital status:      Spouse name: Not on file   • Number of children: 0   • Years of education: Not on file   • Highest education level: Not on file   Occupational History   • Occupation: unemployed   Tobacco Use   • Smoking status: Former     Packs/day: 0 50     Years: 10 00     Total pack years: 5 00     Types: Cigarettes     Quit date: 2019     Years since quittin 4     Passive exposure: Never   • Smokeless tobacco: Never   Vaping Use   • Vaping Use: Never used   Substance and Sexual Activity   • Alcohol use: Never   • Drug use: No   • Sexual activity: Not on file     Comment: defer   Other Topics Concern   • Not on file   Social History Narrative    Who lives in your home: Alone     What type of home do you live in: Apartment     Age of your home: 1952 built     How long have you been living there: 5 yrs     Type of heat: forced hot air     Type of fuel: electric     What type of jamin is in your bedroom: carpet jamin     Do you have the following in or near your home:    Air products: Humidifier in the bedroom/kitchen     Pests: none     Pets: none     Are pets allowed in bedroom: N/A     Open fields, wooded areas nearby: No     Basement: [id number]-sygsy-no mold     Exposure to second hand smoke: No    Central air     Habits:    Caffeine: Hot tea 1 cup daily- ice tea 1 cup in the afternoon    Chocolate: Occasionally      Social Determinants of Health     Financial Resource Strain: Low Risk  (2021)    Overall Financial Resource Strain (CARDIA)    • Difficulty of Paying Living Expenses: Not hard at all   Food Insecurity: No Food Insecurity (2023)    Hunger Vital Sign    • Worried About Running Out of Food in the Last Year: Never true    • Ran Out of Food in the Last Year: Never true   Transportation Needs: No Transportation Needs (2023)    PRAPARE - Transportation    • Lack of Transportation (Medical):  No    • Lack of Transportation (Non-Medical): No   Physical Activity: Inactive (5/24/2021)    Exercise Vital Sign    • Days of Exercise per Week: 0 days    • Minutes of Exercise per Session: 0 min   Stress: Not on file   Social Connections:  Moderately Isolated (5/24/2021)    Social Connection and Isolation Panel [NHANES]    • Frequency of Communication with Friends and Family: More than three times a week    • Frequency of Social Gatherings with Friends and Family: More than three times a week    • Attends [employer] Services: 1 to 4 times per year    • Active Member of Clubs or Organizations: No    • Attends Club or Organization Meetings: Never    • Marital Status: Never    Intimate Partner Violence: Not At Risk (5/24/2021)    Humiliation, Afraid, Rape, and Kick questionnaire    • Fear of Current or Ex-Partner: No    • Emotionally Abused: No    • Physically Abused: No    • Sexually Abused: No   Housing Stability: Low Risk  (2/21/2023)    Housing Stability Vital Sign    • Unable to Pay for Housing in the Last Year: No    • Number of Places Lived in the Last Year: 1    • Unstable Housing in the Last Year: No      Family History   Problem Relation Age of Onset   • Lung cancer Mother 61   • Brain cancer Mother 61   • Diabetes Father    • Depression Father    • Other Father         septic   • Allergies Sister    • Hashimoto's thyroiditis Sister    • Abdominal aortic aneurysm Sister    • Diabetes Sister    • No Known Problems Sister    • No Known Problems Maternal Grandmother    • No Known Problems Maternal Grandfather    • No Known Problems Paternal Grandmother    • No Known Problems Paternal Grandfather    • Hodgkin's lymphoma Brother    • No Known Problems Brother    • No Known Problems Maternal Aunt    • Diabetes Other      Past Surgical History:   Procedure Laterality Date   • COLONOSCOPY     • DILATION AND CURETTAGE OF UTERUS     • EGD     • EXPLORATORY LAPAROTOMY      for infertility   • HAND SURGERY      Hand excision of tendon cyst   • VA LAPAROSCOPIC APPENDECTOMY N/A 05/03/2022    Procedure: APPENDECTOMY LAPAROSCOPIC;  Surgeon:  Shanika Webb MD;  Location: [facility];  Service: General   • VA TEAEC W/PATCH GRF CAROTID VERTB Víctor Left 1/31/2023    Procedure: EXPLORATION OF LEFT CAROTID ARTERY; ABORTED ENDARTERECTOMY ARTERY CAROTID;  Surgeon: Naheed Ballard DO;  Location: [facility];  Service: Vascular   • SUPERIOR MESTENTERIC ARTERY STENT     • WISDOM TOOTH EXTRACTION         Current Outpatient Medications:   •  Ascorbic Acid (vitamin C) 1000 MG tablet, Take 1,000 mg by mouth daily, Disp: , Rfl:   •  aspirin 81 mg chewable tablet, Chew 1 tablet (81 mg total) daily, Disp: , Rfl: 0  •  Biotin w/ Vitamins C & E (HAIR/SKIN/NAILS PO), Take by mouth, Disp: , Rfl:   •  butalbital-acetaminophen-caffeine (FIORICET,ESGIC) -40 mg per tablet, TAKE ONE TABLET BY MOUTH EVERY 6 HOURS AS NEEDED FOR HEADACHES , Disp: 90 tablet, Rfl: 1  •  chlordiazepoxide-clidinium (LIBRAX) 5-2 5 mg per capsule, Take 1 capsule by mouth 4 (four) times a day (before meals and at bedtime) (Patient taking differently: Take 1 capsule by mouth as needed), Disp: 120 capsule, Rfl: 5  •  Cholecalciferol 25 MCG (1000 UT) tablet, Take 1,000 Units by mouth daily, Disp: , Rfl:   •  clotrimazole-betamethasone (LOTRISONE) 1-0 05 % cream, , Disp: , Rfl:   •  diltiazem (DILACOR XR) 240 MG 24 hr capsule, Take 1 capsule (240 mg total) by mouth daily, Disp: 90 capsule, Rfl: 3  •  Docusate Sodium (COLACE PO), Take by mouth daily at bedtime, Disp: , Rfl:   •  Eliquis 5 MG, TAKE ONE TABLET BY MOUTH TWICE DAILY, Disp: 180 tablet, Rfl: 0  •  famotidine (PEPCID) 20 mg tablet, Take 20 mg by mouth daily at bedtime, Disp: , Rfl:   •  fexofenadine (ALLEGRA) 180 MG tablet, Take 180 mg by mouth daily, Disp: , Rfl:   •  fluticasone-vilanterol (BREO ELLIPTA) 200-25 MCG/INH inhaler, Inhale 1 puff daily Rinse mouth after use , Disp: 3 Inhaler, Rfl: 3  •  furosemide (LASIX) 40 mg tablet, TAKE ONE TABLET BY MOUTH ONCE DAILY, Disp: 90 tablet, Rfl: 0  •  Galcanezumab-gnlm 120 MG/ML SOAJ, Inject 120 mg under the skin every 30 (thirty) days, Disp: 1 mL, Rfl: 11  •  hydrOXYzine HCL (ATARAX) 25 mg tablet, TAKE TWO TABLETS BY MOUTH DAILY AT BEDTIME, Disp: 90 tablet, Rfl: 0  •  LORazepam (ATIVAN) 1 mg tablet, Take 1 tablet (1 mg total) by mouth every 6 (six) hours as needed for anxiety, Disp: 90 tablet, Rfl: 0  •  Menaquinone-7 (VITAMIN K2 PO), Take 7 Units by mouth if needed, Disp: , Rfl:   •  metoprolol succinate (TOPROL-XL) 100 mg 24 hr tablet, Take 1 tablet (100 mg total) by mouth 2 (two) times a day, Disp: 180 tablet, Rfl: 2  •  montelukast (SINGULAIR) 10 mg tablet, TAKE ONE TABLET BY MOUTH DAILY AT BEDTIME, Disp: 90 tablet, Rfl: 0  •  MULTIPLE VITAMIN PO, Take by mouth, Disp: , Rfl:   •  omeprazole (PriLOSEC) 40 MG capsule, Take 1 capsule (40 mg total) by mouth daily, Disp: 309 capsule, Rfl: 4  •  ondansetron (Zofran ODT) 4 mg disintegrating tablet, Take 1 tablet (4 mg total) by mouth every 6 (six) hours as needed for nausea or vomiting, Disp: 60 tablet, Rfl: 0  •  phenazopyridine (PYRIDIUM) 200 mg tablet, Take 1 tablet (200 mg total) by mouth 3 (three) times a day as needed for bladder spasms Not to be used for more than 2 consecutive days  , Disp: 30 tablet, Rfl: 0  •  Probiotic Product (PROBIOTIC-10 PO), Take 1 tablet by mouth in the morning, Disp: , Rfl:   •  promethazine (PHENERGAN) 25 mg tablet, Take 1 tablet (25 mg total) by mouth every 6 (six) hours as needed for nausea or vomiting, Disp: 60 tablet, Rfl: 1  •  Repatha SureClick 862 MG/ML SOAJ, Inject 140mg under the skin every 2 weeks  , Disp: 2 mL, Rfl: 11  •  spironolactone (ALDACTONE) 25 mg tablet, TAKE ONE TABLET BY MOUTH TWICE DAILY, Disp: 180 tablet, Rfl: 0  •  traMADol (Ultram) 50 mg tablet, Take 2 tablets (100 mg total) by mouth every 8 (eight) hours as needed for moderate pain, Disp: 60 tablet, Rfl: 0  •  Turmeric 500 MG CAPS, , Disp: , Rfl:   •  Ubrogepant (UBRELVY) 100 MG tablet, Take 1 tablet (100 mg) one time as needed for migraine  May repeat one additional tablet (100 mg) at least two hours after the first dose  Do not use more than two doses per day, Disp: 16 tablet, Rfl: 11  •  Zinc 100 MG TABS, Take by mouth daily, Disp: , Rfl:   •  naloxone (NARCAN) 4 mg/0 1 mL nasal spray, Administer 1 spray into a nostril  If no response after 2-3 minutes, give another dose in the other nostril using a new spray  (Patient not taking: Reported on 3/28/2023), Disp: 1 each, Rfl: 1    Current Facility-Administered Medications:   •  cyanocobalamin injection 1,000 mcg, 1,000 mcg, Intramuscular, Q30 Days, Jessica Jeong DO, 1,000 mcg at 11/28/22 1017        Review of Systems:  Review of Systems   Constitutional: Negative for activity change, fever and unexpected weight change  HENT: Negative for facial swelling, nosebleeds and voice change  Respiratory: Negative for chest tightness, shortness of breath and wheezing  Cardiovascular: Negative for chest pain, palpitations and leg swelling  Gastrointestinal: Negative for abdominal distention  Genitourinary: Negative for hematuria  Musculoskeletal: Negative for arthralgias  Skin: Negative for color change, pallor, rash and wound  Neurological: Negative for dizziness, seizures, syncope and light-headedness  Psychiatric/Behavioral: Negative for agitation  Physical Exam:  Physical Exam  Vitals reviewed  Constitutional:       Appearance: She is well-developed  HENT:      Head: Normocephalic and atraumatic  Cardiovascular:      Rate and Rhythm: Normal rate and regular rhythm  Heart sounds: Normal heart sounds  Pulmonary:      Effort: Pulmonary effort is normal       Breath sounds: Normal breath sounds  Abdominal:      Palpations: Abdomen is soft  Musculoskeletal:         General: Normal range of motion        Cervical back: Normal range of motion and neck supple  Skin:     General: Skin is warm and dry  Neurological:      Mental Status: She is alert and oriented to person, place, and time  Psychiatric:         Behavior: Behavior normal          Thought Content: Thought content normal          Judgment: Judgment normal          This note was completed in part utilizing M-Modal Fluency Direct Software  Grammatical errors, random word insertions, spelling mistakes, and incomplete sentences can be an occasional consequence of this system secondary to software limitations, ambient noise, and hardware issues  If you have any questions or concerns about the content, text, or information contained within the body of this dictation, please contact the provider for clarification

## 2023-06-05 NOTE — TELEPHONE ENCOUNTER
Unfortunately I think this is a sign of her untreated sleep apnea as this is not a typical response to Emgality but I suspect it may worsen intracranial hypertension from untreated sleep apnea.  The most likely thing to help would therefore be a steroid like Decadron that I will send over to the pharmacy now.  Without being able to evaluate her, would recommend ER if she develops any concerning symptoms, shortness of breath does concern me, glad to hear that she saw cardiology today and would assume they would have sent her to the ER if they felt it was necessary for her shortness of breath.  Glad to see that I will see her before her next visit and we can discuss at that point whether we will have her do another Emgality or not depending how this month goes.    -     dexamethasone (DECADRON) 4 mg tablet; 1 tab p.o. with dinner tonight for unrelenting migraine, then if needed 1 tab with breakfast for 1 to 5 days for unrelenting migraine

## 2023-06-05 NOTE — TELEPHONE ENCOUNTER
Called and advised pt of all of the below. She verbalized clear understanding. States that she used to use cpap but she stopped using it bec she was sleeping really good w/o it. She feel refresh waking up in the morning. She was suppose to get home sleep study but sleep medicine never called her back. She will f/u on this.     States that she took Ubrelvy 1 tab last Friday and she was fine. Yesterday afternoon she took emgality 2 shots. Around 10 am today, her HA started. Within 45 min to a hour of taking it, she felt lightheaded and sob. She got really stuffy. Her bp dropped  from 122/79 to 108/64. States that the only thing different was the emgality shot. She would be happy to discuss this w/ you at her upcoming appt.   At this time, she does not feel that she needs to go to the ER.      aileen

## 2023-06-05 NOTE — TELEPHONE ENCOUNTER
"Patient called into report Severe Migraine since last night.   Asking for a call back  CB# [phone]    I called and spoke with patient. Reports Migraine since yesterday. Took loading dose of Emgality yesterday and headache started shortly after.  Patient states Migraine is worse today. Took Ubrelvy this morning @ 9-10 am.  Also reports Nausea and \"my eyes feel off\" \"Like I can't focus\"  Patient took Phenergan for the Nausea and was able to eat something.    Patient also reports SOB which started yesterday after the Emgality. States it is somewhat better today. She does have Asthma and took her inhaler. Patient also with Stuffiness, \"feels blocked up\"  Patient reports no rash, or itchiness. No localized rash at  injection site.     Patient had follow up with Cardiology today and her BP was 113/73 HR 59.  Patient has Fioricet on hand and was asking if she can take that or try another Ubrelvy?     LOV 4/17/23 with Dr. Stacy Raymond  Please advise   CB# [phone]    "

## 2023-06-06 ENCOUNTER — TELEPHONE (OUTPATIENT)
Dept: VASCULAR SURGERY | Facility: CLINIC | Age: 63
End: 2023-06-06

## 2023-06-06 NOTE — TELEPHONE ENCOUNTER
6/21/23 OV cancelled and recall filed for OV    Patient aware we will call to schedule Mr. Donaldson was seen by  and treatment team. He is cooperative with staff and complaint with treatment. He reports he ate and slept well. ADL's are within normal limits. Mr. Donaldson has been attending groups. He denies any suicidal ideation, intent or plan. Discharge plan is a referral to [facility] and patient is in agreement with this plan. At this time, Mr. Donaldson is not yet psychiatrically stable for discharge.

## 2023-06-06 NOTE — TELEPHONE ENCOUNTER
----- Message from [address] [place] [address] sent at 6/5/2023  5:48 PM EDT -----  Regarding: Change OV  Hello,    Would you be able to cancel Ms Jones's  upcoming appointment with Dr Gillette on 6/21 and reschedule her to see Dr Gillette after she completes her Carotid ultrasound (9/12/23)  Let her know I checked with him first  I just saw her last week but wanted to be sure he didn't need to see her sooner and he would like to see her after she completes her next carotid ultrasound       Thanks,    Kuldip Cano

## 2023-06-08 ENCOUNTER — EVALUATION (OUTPATIENT)
Dept: PHYSICAL THERAPY | Facility: REHABILITATION | Age: 63
End: 2023-06-08
Payer: COMMERCIAL

## 2023-06-08 DIAGNOSIS — R39.89 BLADDER PAIN: ICD-10-CM

## 2023-06-08 DIAGNOSIS — G89.29 CHRONIC ABDOMINAL PAIN: ICD-10-CM

## 2023-06-08 DIAGNOSIS — M62.89 PELVIC FLOOR DYSFUNCTION: Primary | ICD-10-CM

## 2023-06-08 DIAGNOSIS — R10.9 CHRONIC ABDOMINAL PAIN: ICD-10-CM

## 2023-06-08 DIAGNOSIS — K59.09 OTHER CONSTIPATION: ICD-10-CM

## 2023-06-08 PROCEDURE — 97161 PT EVAL LOW COMPLEX 20 MIN: CPT | Performed by: PHYSICAL THERAPIST

## 2023-06-08 PROCEDURE — 97530 THERAPEUTIC ACTIVITIES: CPT | Performed by: PHYSICAL THERAPIST

## 2023-06-08 NOTE — PROGRESS NOTES
PT Evaluation     Today's date: 2023  Patient name: Destiney Murillo  : 1960  MRN: [id number]  Referring provider: Theodore Zhao, *  Dx:   Encounter Diagnosis     ICD-10-CM    1  Pelvic floor dysfunction  M62 89       2  Other constipation  K59 09       3  Chronic abdominal pain  R10 9     G89 29       4  Bladder pain  R39 89           Start Time: 0800  Stop Time: 0855  Total time in clinic (min): 55 minutes    Assessment  Assessment details: Patient is a 70 y o  female who presents to physical therapy with diagnosis of constipation and abdominal pain  Pt presents upon re-evaluation after 7 week break from PT secondary to other medical conditions with urinary urgency, urinary leakage with urge and stress incontinence and bladder pain as well as intermittent constipation  She does present with improvements with urinary frequency and urinary straining as well as (-) painful urination  Pt presents with poor load transfer proximal hip weakness, (+) EMANI/FADIR, and tenderness throughout b/l piriformis, OI and post pelvic floor as well as abdominal tenderness which may be contributing to her symptoms  Pt will benefit form continued skilled PT to improve urinary urgency and leakage, decrease bladder pain and manage constipation  Pt is in agremeent with this POC  Impairments: abnormal or restricted ROM, activity intolerance, impaired balance, impaired physical strength, lacks appropriate home exercise program and pain with function  Understanding of Dx/Px/POC: good   Prognosis: good    Goals  Short term goals: (2-3 weeks)  Pt will be able to delay urge at least 10 minutes   Pt will demonstrate good understanding of urge delay techniques in 6 weeks  Pt will utilize proper bowel mechanics and posture  Pt will present wt MMT 4/5 b/l LE all planes    Long term goals: (10 weeks)  Pt will be compliant with HEP by discharge    Pt will be able to delay urge for greater than 20 minutes by discharge  Pt will present with bladder pain rated 3/10 at worst         Plan  Plan details: Cont per POC  PT reviewed attendance policy  Assess response to urge deferral, urinary hesitancy, bowel mechanics and posture  Patient would benefit from: skilled physical therapy  Planned modality interventions: cryotherapy, biofeedback and thermotherapy: hydrocollator packs  Planned therapy interventions: joint mobilization, manual therapy, neuromuscular re-education, patient education, strengthening, stretching, therapeutic activities, therapeutic exercise, activity modification, abdominal trunk stabilization, coordination, flexibility, functional ROM exercises and home exercise program  Frequency: 1x week  Duration in weeks: 12  Treatment plan discussed with: patient        PT Pelvic Floor Subjective:   History of Present Illness:       Prior Pregnancy comment:  Infertility for 14 years Pt returns to PT after 7 weeks secondary to dealing with other comorbidities with A-fib, a UTI treated with antibiotics ion 5/15 and difficulties with her interstitial cystitis       Pt continues to have bladder pain, 0-4/10 at worst, worsened by unknown  She has urinary leakage: 1 time in the past week, walking to the bathroom with an urge and pee dribbled out  She has urinary urgency: daily but not every void     Social Support:     Lives in:  [address] [place] [address] and apartment    Lives with:  Alone    Work status: unemployed    Life stress severity: moderate    History of Depression: yes (20 years ago)  Diet and Exercise:    Diet:balanced nutrition    Exercise type: no activity  OB/ gyn History    Gestational History:     Prior Pregnancy: No      Menstrual History:      Menopausal: menopause  Bladder Function:     Voiding Difficulties positive for: urgency and hesitancy      Voiding Difficulties negative for: frequent urination, straining and incomplete emptying      Voiding Difficulties comments:     Voiding frequency: every 3-4 hours    Urinary leakage: "urine leakage (little dribbles)    Urinary leakage aggravated by: bending, standing up and walking to the bathroom    Urinary leakage not aggravated by: post-void dribble    Nocturia (episodes per night): 0    Painful urination: No      Intake (ounces): Intake (ounces) comment: 72 oz water   Incontinence Management:     Pads/Diaper Use:  None    Pads/Diapers Additional Comments: does not normally wear them, wore one today because she had leakage this morning   Bowel Function:     Bowel frequency: daily and every 2 days    Lipscomb Stool Scale: type 2, type 3 and type 4    Stool softener use: stool softeners (miralax)    Enema use: no enema    Uses \"squatty potty\": no Squatty Potty  Sexual Function:     Sexually Active:  Not sexually active  Treatments:     Current treatment: physical therapy    Patient Goals: Other patient goals:  Be able to pee without hesitation, control urge, no leakage       Objective     Pt provided verbal consent prior to and throughout objective assessment       Tenderness:  Piriformis: pos b/l  Post pelvic floor: pos b/l  Obturator Internus: pos b/l  Adductors: pos b/l  mid and distal 1/3rd tenderness   Linea alba: distal 1/3rd tenderness     Lumbar AROM   WNL     SLS  Poor load transfer b/l, R>L, ipsilateral lean    Other Comments:  Fair PFM contraction, compensates with adductors, improved with cueing  Fair TAC, compensates with breath holding       MMT:   Left Right   Hip flex 3+ 3+   Hip ABD 3+ 3+   Hip ext 3+ 4-   Hip IR 4+ 4   Hip ER 4 4   Knee flex 4+ 4+   Knee ext 4+ 4+   Ankle DF 4+ 4+               Special Tests:   Left Right   EMANI +, ant hip pain +   FADIR +, ant hip pain -   Post Thigh Thrust - -   Pop angle 50 deg 57 deg                     Precautions: a-fib, anxiety, HTN  asthma  Impairments/functional limitations: urinary urgency, frequency, leakage, constipation, pelvic pain   Interstitial cystitits  Daily Treatment Diary    Manuals 6/8         External assess        " Internal assess                              Neuro Re-Ed          TAC          PF          TAC+PF          TAC+PF with march          TAC+PF with alt knee fall out          TAC+PF with ball squeeze          TAC+PF with SLR          TAC+PF with s/l ABD          TAC+PF with bridge                              360 deg breathing          Ther Ex          Supine piri stretch          LTR          Seated piri stretch          Supine butterfly stretch          Seated hs stretch                                        Ther Activity          Urge delay:QF review         Urge delay: HR review         Freeze, breathe, squeeze  review         The martha rebolledo         Bowel mechanics/posture Review          Managing IAP/exhale on exertion Review          Healthy bladder habits  Bladder irritants, sipping vs cugging         Urinary hesitancy                     Gait Training                              Modalities

## 2023-06-09 ENCOUNTER — EVALUATION (OUTPATIENT)
Dept: PHYSICAL THERAPY | Facility: REHABILITATION | Age: 63
End: 2023-06-09
Payer: COMMERCIAL

## 2023-06-09 DIAGNOSIS — I63.9 ACUTE CVA (CEREBROVASCULAR ACCIDENT) (HCC): Primary | ICD-10-CM

## 2023-06-09 DIAGNOSIS — R53.1 LEFT-SIDED WEAKNESS: ICD-10-CM

## 2023-06-09 DIAGNOSIS — R26.89 BALANCE PROBLEM: ICD-10-CM

## 2023-06-09 PROCEDURE — 97110 THERAPEUTIC EXERCISES: CPT | Performed by: PHYSICAL THERAPIST

## 2023-06-09 PROCEDURE — 97112 NEUROMUSCULAR REEDUCATION: CPT | Performed by: PHYSICAL THERAPIST

## 2023-06-09 NOTE — PROGRESS NOTES
Daily Note/Update         Name: Taylor Kemp  Date: 23  : 1960  Referring Provider: Tomas Grande DO  AUTHORIZATION:   Insurance: Payor: [address] [place] [address] [place]  REP / Plan: [address] [facility] CARE COMM [address] / Product Type: Medicare HMO /   6 of 16 visits through , PN due       SUBJECTIVE: I have been working on moving more  HPI: Taylor Kemp is a 70 y o  female referred to outpatient physical therapy for the following diagnosis   Encounter Diagnoses   Name Primary? • Acute CVA (cerebrovascular accident) [facility]) Yes   • Left-sided weakness    • Balance problem          Patient-Specific Functional Scale   Task is scored 0 (unable to perform activity) to 10 (ability to perform activity independently)  Activity    1  Carry the laundry up the steps without difficulty  3 8 8   2  Taking the garbage out  3 7 8   3  Feel comfortable walking outside without loss of balance  5 10 10            OBJECTIVE:  BP: 138/76 BP HR 53    Mobility Measures    Assistive device used? None     Walking speed   84  meters/second   89 m/s 1 04 m/s   6 Minute Walk Test (100 foot circular course) 500' only 4 minutes 7/10 RPE, felt SOB  R hip pain 4/10   96% SpO2 hr 98 bpm        600' in 4 minutes   890' in 6 minutes     Limited by L foot pain 5/10   SpO2 96%   HR 46 bpm  1075  HR 81 Spo2 98%  Hip 4/10    TUG  n/t Trial 1: 10 51 s  Trial 2: 8 77 s Trial 1: 9 seconds              5 times sit to stand 14 43 seconds 13 43 s 11 75   Functional Gait Assessment or Saavedra Balance Scale 15/30 27/30 28/30   Modified Jyoti Scale Score 2 - Slight disability; unable to carry out all previous activity, but able to look after own affairs without assistance 2 - Slight disability    Patient-Reported Outcome Measure:   Stroke Impact Scale  Patient Specific Functional Scale (PSFS)    MCTSIB     4  foam EC 30s EC Foam       Functional Gait Assessment  3/3 "Gait level surface  3/3 Change in gait speed  3/3 Gait with horizontal head turns  3/3 Gait with vertical head turns  3/3 Gait and pivot turn  3/3 Step over obstacle  2/3 Gait with narrow base of support  2/3 Gait with eyes closed  3/3 Ambulating backwards  2/3 Steps  28/30 Total score (less than 22/30 indicates increased risk of fall)        Precautions Precautions: a-fib, anxiety, HTN, COPD, closely monitor vital signs     Specialty Treatment Diary  4/21 5/9 5/15 5/23 6/09 Re=-Asess   See FOM above   Home exercise program        Walking/endurance Biodex  1 0 mph 1 min  1 5 mph 2 min  2 0 mph 7 min  10 min  +  Rest  2 0 mph x 5 min   6MWT    5xSTS  TUG   Scifit 10 min  65-75 stp    True treadmill  1 3 mph 2  Min  1 5 mph 3 min  2 UE support to 1 UE support Scifit  12 min 75-82 stp    True Treadmill  1 6 mph 2 min  1 9 mph 1 5 min  94 HR  2 4 1 min  1 8 1 min  2 4 1 min Biodex treadmill    1 5 mph x 1 min  1 7 mph x 4 min  1 9 mph x 3 min    * left dorsal aspect of foot was bothering, show was taken off, modified shoelace tension with improved results and ability to walk further on treadmill     Static and dynamic balance Solostep    Compliant surface management ( object under for increased challenge)   circles x 4  Sideways    100' x 2  HT  HN  180 turns 2# weights  EC   Hurdles 10\"  4 laps         fwd         Sideways    Foam beam x 3 laps ea  Forwards  sideways     FGA  MCTSIB ECFoam    Foam beams 3x  FWD    Added 3 hurdles 3x FWD/Sideways  3x Foam beams  Forwards 3 laps  Sideways 3 laps  Added 10' hurdles with increased challenge  4 frw/4 sideways    100' x 3  180 degree turns    Backwards 100' x 1    Incline surface  Forwards/turn  Forwards/backwards * LOB 3 laps each    Incline forwards downs/step onto compliance surface/ steping down/return  6 laps Solostep    10\" hurdles   4 laps forwards  4 laps sideways    Added foam beam balance   3 laps fwd'/sdw    Strengthening nv  Sit to stand  5' x 1  #4 10' x 1  5' x 1 " Sit to stand with 4# with  10 x 2    Stretching                ASSESSMENT:  Patient demonstrated improvement in balance, endurance, stretch and decreased risk of fall as demonstrated by improvement sin functional outcome measures  She will continue to benefit from skilled PT to maximize tolerance and endurance to increase participation in community and meaningful activities  STG's:     - Patient improves EC on foam task of MCSTIB for improved static balance within 4 weeks  - MET    - Patient is independent with initial home exercise program for improvements at home within 2 weeks  - MET    - Patient ambulates for 10 consecutive min with appropriate vital sign response for improved endurance within 4 weeks  - ongoing  LTG's:   - Patient improves distance ambulated on 4 minute walk by 10% within 8 weeks, and ability to tolerate ambulation for full 6 minutes for improved endurance  - MET   - Patient decreased time to complete 5xSTS by 3 seconds for improved functional strength and decreased risk of falls - ongoing   - Patient improves score on FGA to at least 22/30 for improved dynamic balance and decreased risk of falls in 8 weeks  - MET  - Patient improved PSFS to >20/30 to demonstrate improved confidence and ability to perform personal goals in 8 weeks  - MET  Updated Goal:  -Patient will be able to ambulate >1200 feet in 6 MWT for increased endurance  PLAN OF CARE:  Patient will benefit from physical therapy 1-2 times per week for 4 more weeks per POC  Neuromuscular re-education, therapeutic exercises, and therapeutic activities as outlined in grids      Diana Martini, PT  6/9/2023

## 2023-06-12 ENCOUNTER — OFFICE VISIT (OUTPATIENT)
Dept: PHYSICAL THERAPY | Facility: REHABILITATION | Age: 63
End: 2023-06-12
Payer: COMMERCIAL

## 2023-06-12 DIAGNOSIS — K59.09 OTHER CONSTIPATION: ICD-10-CM

## 2023-06-12 DIAGNOSIS — R53.1 LEFT-SIDED WEAKNESS: ICD-10-CM

## 2023-06-12 DIAGNOSIS — I63.9 ACUTE CVA (CEREBROVASCULAR ACCIDENT) (HCC): Primary | ICD-10-CM

## 2023-06-12 DIAGNOSIS — R10.9 CHRONIC ABDOMINAL PAIN: ICD-10-CM

## 2023-06-12 DIAGNOSIS — G89.29 CHRONIC ABDOMINAL PAIN: ICD-10-CM

## 2023-06-12 DIAGNOSIS — M62.89 PELVIC FLOOR DYSFUNCTION: Primary | ICD-10-CM

## 2023-06-12 DIAGNOSIS — R39.89 BLADDER PAIN: ICD-10-CM

## 2023-06-12 DIAGNOSIS — R26.89 BALANCE PROBLEM: ICD-10-CM

## 2023-06-12 PROCEDURE — 97110 THERAPEUTIC EXERCISES: CPT | Performed by: PHYSICAL THERAPIST

## 2023-06-12 PROCEDURE — 97112 NEUROMUSCULAR REEDUCATION: CPT | Performed by: PHYSICAL THERAPIST

## 2023-06-12 PROCEDURE — 97530 THERAPEUTIC ACTIVITIES: CPT | Performed by: PHYSICAL THERAPIST

## 2023-06-12 NOTE — PROGRESS NOTES
Daily Note     Today's date: 2023  Patient name: Juan Shepard  : 1960  MRN: [id number]  Referring provider: Britney Gusman, *  Dx:   Encounter Diagnosis     ICD-10-CM    1  Pelvic floor dysfunction  M62 89       2  Other constipation  K59 09       3  Chronic abdominal pain  R10 9     G89 29       4  Bladder pain  R39 89           Start Time: 930          Subjective: Pt reports she is ding pretty well  On Thursday and Friday last week she had more than a dribble of leakage when standing from the chair to go to the kitchen and one time when coming home from the grocery store  Objective: See treatment diary below      Assessment: PT cued pt on continued repeated use of urge deferral when the urge is high such as when coming home from the store to manage incontinence  PT also cued pt on managing IAP withexhale on exertion to manage stress incontinence episodes  Initiated the knack to manage pressure with coughing and sneezing  Initiated LE strengthening exercises with focus on coordination of TAC/PFM and breath control  Tolerated treatment well  Patient would benefit from continued PT      Plan: Continue per plan of care  Access Code: [email or web address]  URL: [email or web address]  Date: 2023  Prepared by: Amisha Jasso    Program Notes  Tighten your belly and pelvic floor before each strengthening repetition, relax after each strengthening repetition,  breathe easily throughout      Exercises  - Supine Bridge  - 3-4 x weekly - 2 sets - 10 reps - 2-3 secs hold  - Supine March  - 1 x daily - 3-4 x weekly - 2 sets - 10 reps  - Bent Knee Fallouts with Alternating Legs  - 1 x daily - 3-4 x weekly - 2 sets - 10 reps  - Supine Hip Adduction Isometric with Ball  - 1 x daily - 3-4 x weekly - 2 sets - 10 reps - 5 secs hold     Precautions: a-fib, anxiety, HTN  asthma  Impairments/functional limitations: urinary urgency, frequency, leakage, constipation, pelvic pain   Interstitial "cystitits  Daily Treatment Diary    Manuals 6/8 6/12        External assess          Internal assess                              Neuro Re-Ed          TAC  review        PF  review        TAC+PF  Review, with breath        TAC+PF with march  x10 ea alt        TAC+PF with alt knee fall out  x10 ea alt        TAC+PF with ball squeeze  5\"x10        TAC+PF with SLR          TAC+PF with s/l ABD          TAC+PF with bridge  3\"x10                            360 deg breathing          Ther Ex          Bike   5 mins L1, RPE 4        Supine piri stretch          LTR          Seated piri stretch          Supine butterfly stretch          Seated hs stretch                                        Ther Activity          Urge delay:QF review         Urge delay: HR review         Freeze, breathe, squeeze  review Review         The knack nv Review         Bowel mechanics/posture Review  Review        Managing IAP/exhale on exertion Review  Review         Healthy bladder habits  Bladder irritants, sipping vs cugging Review         Urinary hesitancy   nv                  Gait Training                              Modalities                                          "

## 2023-06-12 NOTE — PROGRESS NOTES
Daily Note     Today's date: 2023  Patient name: Silviano Gonzales  : 1960  MRN: [id number]  Referring provider: Yesika Kaufman DO  Dx:   Encounter Diagnosis     ICD-10-CM    1  Acute CVA (cerebrovascular accident) ([facility] [address] )  I63 9       2  Left-sided weakness  R53 1       3  Balance problem  R26 89                      Subjective: Feels like strength is the most important thing she needs to work on  Complains of bilateral hip pain on and off  Objective: See treatment diary below    BP: 140/80    Assessment: Tolerated treatment well  Pt with concerns of hip pain with exercise today, however demonstrated improved pain and tightness in hip and knees following treadmill and strength training today  Demonstrated fair dynamic balance and ability to self correct balance with progression of dynamic balance training today  Updated HEP with STS exercise today  Pt was interested in doing more lateral tap down exercise and down hill walking on treadmill NV  Recommended further practice in PT for technique with lateral tap downs before performing at home  Patient would benefit from continued PT      Plan: Continue per plan of care  Downhill walking on treadmill and more lateral tap downs NV        Precautions Precautions: a-fib, anxiety, HTN, COPD, closely monitor vital signs     HEP: Sit to stand      Specialty Treatment Diary  4/21 5/9 5/15 5/23 6/09 Re=-Asess   See FOM above    Home exercise program              Walking/endurance Biodex  1 0 mph 1 min  1 5 mph 2 min  2 0 mph 7 min  10 min  +  Rest  2 0 mph x 5 min    6MWT     5xSTS  TUG    Scifit 10 min  65-75 stp     True treadmill  1 3 mph 2  Min  1 5 mph 3 min  2 UE support to 1 UE support Scifit  12 min 75-82 stp     True Treadmill  1 6 mph 2 min  1 9 mph 1 5 min  94 HR  2 4 1 min  1 8 1 min  2 4 1 min Biodex treadmill     1 5 mph x 1 min  1 7 mph x 4 min  1 9 mph x 3 min     * left dorsal aspect of foot was bothering, show was taken off, modified "shoelace tension with improved results and ability to walk further on treadmill  BD treadmill    1  9mph x 5min  1  9mph-2 0 x 4min         Static and dynamic balance Solostep     Compliant surface management ( object under for increased challenge)   circles x 4  Sideways     100' x 2  HT  HN  180 turns 2# weights  EC   Hurdles 10\"  4 laps         fwd         Sideways     Foam beam x 3 laps ea  Forwards  sideways       FGA  MCTSIB ECFoam     Foam beams 3x  FWD     Added 3 hurdles 3x FWD/Sideways  3x Foam beams  Forwards 3 laps  Sideways 3 laps  Added 10' hurdles with increased challenge  4 frw/4 sideways     100' x 3  180 degree turns     Backwards 100' x 1     Incline surface  Forwards/turn  Forwards/backwards * LOB 3 laps each     Incline forwards downs/step onto compliance surface/ steping down/return  6 laps Solostep     10\" hurdles   4 laps forwards  4 laps sideways     Added foam beam balance   3 laps fwd'/sdw   Solo    10\" Hurdles over foam beams  -4 laps   -4 laps           Strengthening nv   Sit to stand  5' x 1  #4 10' x 1  5' x 1 Sit to stand with 4# with  10 x 2   Sit to stand with 4# over head   10 x 2    Lateral tap downs 4in, x8 ea   Stretching                                      "

## 2023-06-13 ENCOUNTER — HOSPITAL ENCOUNTER (OUTPATIENT)
Dept: NON INVASIVE DIAGNOSTICS | Facility: CLINIC | Age: 63
Discharge: HOME | End: 2023-06-13
Payer: COMMERCIAL

## 2023-06-13 DIAGNOSIS — I73.9 PERIPHERAL VASCULAR DISEASE (HCC): ICD-10-CM

## 2023-06-13 DIAGNOSIS — M54.2 NECK PAIN: ICD-10-CM

## 2023-06-13 PROCEDURE — 93923 UPR/LXTR ART STDY 3+ LVLS: CPT

## 2023-06-13 PROCEDURE — 93925 LOWER EXTREMITY STUDY: CPT

## 2023-06-14 PROCEDURE — 93923 UPR/LXTR ART STDY 3+ LVLS: CPT | Performed by: SURGERY

## 2023-06-14 PROCEDURE — 93925 LOWER EXTREMITY STUDY: CPT | Performed by: SURGERY

## 2023-06-14 RX ORDER — TRAMADOL HYDROCHLORIDE 50 MG/1
100 TABLET ORAL EVERY 8 HOURS PRN
Qty: 60 TABLET | Refills: 0 | Status: SHIPPED | OUTPATIENT
Start: 2023-06-14

## 2023-06-15 ENCOUNTER — TELEPHONE (OUTPATIENT)
Dept: NEUROLOGY | Facility: CLINIC | Age: 63
End: 2023-06-15

## 2023-06-15 NOTE — TELEPHONE ENCOUNTER
Received VM transcription:    Hi, this is Deny calling from [facility] in regard to a patient, Velva Harada  Date of birth 1960  I'm calling about an authorization that's needed for Emgality pen 120 mg  We faxed over a PA form on the 12th of June  Just wanted to see if you received it, see if you're working on it  So give us a call back  Our number is [phone]  And again this is Deny calling from [facility]  Thank you

## 2023-06-15 NOTE — TELEPHONE ENCOUNTER
Per enc 3/1/23-There is active PA approval on file good through 3/5/24 ([employer])  Called [employer], spoke to Bernardo and advised of the above  States that [employer] is inactive  Pt has [facility] ID # [id number]    PA initiated on CM   (Key: [id number])    Awaiting determination

## 2023-06-19 ENCOUNTER — HOSPITAL ENCOUNTER (OUTPATIENT)
Dept: RADIOLOGY | Facility: HOSPITAL | Age: 63
Discharge: HOME | End: 2023-06-19
Payer: COMMERCIAL

## 2023-06-19 DIAGNOSIS — R13.12 DYSPHAGIA, OROPHARYNGEAL PHASE: ICD-10-CM

## 2023-06-19 DIAGNOSIS — G51.0 PARTIAL FACIAL NERVE PALSY: ICD-10-CM

## 2023-06-19 DIAGNOSIS — I63.9 ACUTE CVA (CEREBROVASCULAR ACCIDENT) (HCC): ICD-10-CM

## 2023-06-19 PROCEDURE — 74230 X-RAY XM SWLNG FUNCJ C+: CPT

## 2023-06-19 PROCEDURE — 92611 MOTION FLUOROSCOPY/SWALLOW: CPT

## 2023-06-19 NOTE — PROCEDURES
"                                   Video Swallow Study      Patient Name: Adonay Rebolledo  Today's Date: 6/19/2023        Past Medical History  Past Medical History:   Diagnosis Date   • A-fib ([address] ) 03/2020   • Allergic    • Anxiety    • Arthritis    • Asthma    • Back pain     and muscle pain   • Bruises easily    • Chronic pain disorder     Interstitial pain   • Colon polyp    • Dental bridge present    • Depression    • Eczema    • GERD (gastroesophageal reflux disease)    • Heart murmur    • History of blood clots     per pt \"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\"   • History of pneumonia    • Hives    • Hyperlipidemia    • Hypertension    • Infertility, female    • Interstitial cystitis    • Migraine     sees neurologist   • Motion sickness    • Palpitations    • PONV (postoperative nausea and vomiting)    • Psychiatric disorder    • TIA (transient ischemic attack) 09/2022    per pt --\"had MRI and 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\"   • Urinary incontinence     wears Pads   • Wears glasses         Past Surgical History  Past Surgical History:   Procedure Laterality Date   • COLONOSCOPY     • DILATION AND CURETTAGE OF UTERUS     • EGD     • EXPLORATORY LAPAROTOMY      for infertility   • HAND SURGERY      Hand excision of tendon cyst   • MD LAPAROSCOPIC APPENDECTOMY N/A 05/03/2022    Procedure: APPENDECTOMY LAPAROSCOPIC;  Surgeon: Inge Butterfield MD;  Location: [facility];  Service: General   • MD TEAEC W/PATCH GRF CAROTID VERTB [place] Left 1/31/2023    Procedure: EXPLORATION OF LEFT CAROTID ARTERY; ABORTED ENDARTERECTOMY ARTERY CAROTID;  Surgeon: Néstor Wing DO;  Location: [facility];  Service: Vascular   • SUPERIOR MESTENTERIC ARTERY STENT     • WISDOM TOOTH EXTRACTION         Summary:  Images are on PACS for review  Oral Phase : Pt presented with oral stage of swallow WNL  Mastication, bolus control, formation, and transfer were WNL       Pharyngeal Phase:  Pt presented " with pharyngeal stage of swallow WNL  Swallows were prompt  Hyoid elevation, laryngeal excursion, and pharyngeal constriction were WNL  Epiglottic inversion was complete  Mild cricopharyngeal prominence however material passed adequately  No retention, aspiration or penetration noted throughout study  Per Esophageal screen   Presented with slow motility with solids and intermittently with liquids  Retropulsion occurred intermittently with thin liquids  Pt had facial grimacing and pointed to neck stating that she felt food was stuck there  ST showed pt monitor that po was within the esophagus and not within the pharynx, pt understood  Pt provided with thin liquids and was able to clear  Pill passed adequately  Recommendations:  Diet: Regular   Liquids: Thin   Meds: As tolerated  Strategies: Alternate liquids with solids, swallow prior to additional po, and  utilize slow rate/small sips  Upright position  F/u ST tx: no  Reflux Precautions  Consider consult with: GI ? Results reviewed with: pt,   If a dedicated assessment of the esophagus is desired, consider esophagram/barium swallow or EGD  Pt is a 70year old female who was referred by Elizabeth Lilly,   Pt reporting tightness in neck stated that it causes difficulty swallowing  Pt pointed to neck stating that is feels like it gets stuck  H&P/Admit info, pertinent provider notes/procedures/studies/PMH:(copied and placed in this report)  Patient is here today to review results of a CV done 6/1/2023  Patient is s/p exploration of left carotid artery - aborted CEA done 1/31/2023 by Dr Zakia Nash  She is also s/p SMA stent placement August/2021 done OSH  Patient has felt a small, pea size lump in the old CEA incision  She denies any pain  Since calling our office the size of the lump has decreased  Patient c/o daily headaches and occasional dizziness   Patient denies any sudden loss of vision, trouble swallowing, slurred speech or any other TIA or Stroke symptoms  Patient is a former smoker  77-year-old female with a past medical history of hypertension, hyperlipidemia, paroxysmal A-fib, CAD, CAMILLE, SMA thrombosis status post [facility] at [facility], obesity, history of TIA, asymptomatic left ICA stenosis patient is status post CEA on 1/31/23 with Dr Albert Julian which was aborted due to 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  Patient reports that she has been receiving physical therapy and speech therapy weekly  She states that her left-sided facial droop    Special Studies   EGD 04/07/2022 Impression   The esophagus appeared normal, biopsies collected in proximal and distal esophagus to rule out eosinophilic esophagitis  The stomach appeared normal  The duodenum appeared normal  EGD 5/28/19 revealed multiple superficial ulcers in the antrum with negative H pylori testing  Previous VBS:none    Precautions  none      Food allergies:  Artifical sweetners   Current diet:  Regular and thin liquids   Premorbid diet:  Regular and thin liquids   Dentition  WNL   Oral Mech  WNL   O2 requirements:   Room Air   Vocal Quality/Speech WNL   Cognitive status:  Alert     Consistencies administered: Barium laden applesauce, hard cookie, sandwich bite, nurtigrain bar, and 13mm barium pill  Liquids were administered by cup and straw  Pt stood to be viewed laterally and in AP position

## 2023-06-20 ENCOUNTER — OFFICE VISIT (OUTPATIENT)
Dept: PHYSICAL THERAPY | Facility: REHABILITATION | Age: 63
End: 2023-06-20
Payer: COMMERCIAL

## 2023-06-20 ENCOUNTER — APPOINTMENT (OUTPATIENT)
Dept: PHYSICAL THERAPY | Facility: REHABILITATION | Age: 63
End: 2023-06-20
Payer: COMMERCIAL

## 2023-06-20 DIAGNOSIS — I63.9 ACUTE CVA (CEREBROVASCULAR ACCIDENT) (HCC): Primary | ICD-10-CM

## 2023-06-20 DIAGNOSIS — R53.1 LEFT-SIDED WEAKNESS: ICD-10-CM

## 2023-06-20 DIAGNOSIS — R26.89 BALANCE PROBLEM: ICD-10-CM

## 2023-06-20 PROCEDURE — 97112 NEUROMUSCULAR REEDUCATION: CPT | Performed by: PHYSICAL THERAPIST

## 2023-06-20 PROCEDURE — 97110 THERAPEUTIC EXERCISES: CPT | Performed by: PHYSICAL THERAPIST

## 2023-06-20 NOTE — TELEPHONE ENCOUNTER
PA has been approved through 12/31/23    Called [facility] specialty pharmacy and left a message on their answering machine letting them know of the approval  Cb if any questions

## 2023-06-20 NOTE — PROGRESS NOTES
Daily Note     Today's date: 2023  Patient name: Bozena Mukherjee  : 1960  MRN: [id number]  Referring provider: Dionne Blankenship DO  Dx:   Encounter Diagnosis     ICD-10-CM    1  Acute CVA (cerebrovascular accident) ([facility] [address] )  I63 9       2  Left-sided weakness  R53 1       3  Balance problem  R26 89                    Session performed by SPT Ulises Hurtado, under direct supervision of DPT Aryan Vasquez  Subjective: Pt reports that her R hip pain improved from last session and found those exercises very helpful  Objective: See treatment diary below    BP: 126/80    Assessment: Pt performed well w/ all activity, but does require frequent seated rest breaks throughout session due to limited aerobic endurance  Pt required SOLO and close supervision to safely participate in dynamic balance activities due to intermittent LOB 2/2 deficits in righting reactions  Pt required min verbal cues to emphasize eccentric phase and limit UE support w/ lateral tap-downs due to deficits in motor coordination and LE strength  Pt would continue to benefit from skilled PT to address the aforementioned deficits to improve her capacity to participate in her daily walking program      Plan: Continue per plan of care  Downhill walking on treadmill and more lateral tap downs NV        Precautions Precautions: a-fib, anxiety, HTN, COPD, closely monitor vital signs     HEP: Sit to stand      Specialty Treatment Diary  4/21 5/9 5/15 5/23 6/09 Re=-Asess   See FOM above    Home exercise program               Walking/endurance Biodex  1 0 mph 1 min  1 5 mph 2 min  2 0 mph 7 min  10 min  +  Rest  2 0 mph x 5 min    6MWT     5xSTS  TUG    Scifit 10 min  65-75 stp     True treadmill  1 3 mph 2  Min  1 5 mph 3 min  2 UE support to 1 UE support Scifit  12 min 75-82 stp     True Treadmill  1 6 mph 2 min  1 9 mph 1 5 min  94 HR  2 4 1 min  1 8 1 min  2 4 1 min Biodex treadmill     1 5 mph x 1 min  1 7 mph x 4 min  1 9 mph x 3 "min     * left dorsal aspect of foot was bothering, show was taken off, modified shoelace tension with improved results and ability to walk further on treadmill  BD treadmill    1  9mph x 5min  1  9mph-2 0 x 4min       TM  5% incline  1 8 mph; 5 min  1 9-2 0 mph; 5 min     Static and dynamic balance Solostep     Compliant surface management ( object under for increased challenge)   circles x 4  Sideways     100' x 2  HT  HN  180 turns 2# weights  EC   Hurdles 10\"  4 laps         fwd         Sideways     Foam beam x 3 laps ea  Forwards  sideways       FGA  MCTSIB ECFoam     Foam beams 3x  FWD     Added 3 hurdles 3x FWD/Sideways  3x Foam beams  Forwards 3 laps  Sideways 3 laps  Added 10' hurdles with increased challenge  4 frw/4 sideways     100' x 3  180 degree turns     Backwards 100' x 1     Incline surface  Forwards/turn  Forwards/backwards * LOB 3 laps each     Incline forwards downs/step onto compliance surface/ steping down/return  6 laps Solostep     10\" hurdles   4 laps forwards  4 laps sideways     Added foam beam balance   3 laps fwd'/sdw   Solo    10\" Hurdles over foam beams  -4 laps   -4 laps         SOLO    6in hurdles over foam beams ->small compliant ramp on horizontal ->airex-> 12 in hurdles    -6 laps fwd  -3 laps lat   Strengthening nv   Sit to stand  5' x 1  #4 10' x 1  5' x 1 Sit to stand with 4# with  10 x 2   Sit to stand with 4# over head   10 x 2    Lateral tap downs 4in, x8 ea Lateral tap downs 4in, 2x10 ea w/ UL fingertips   Stretching                                        "

## 2023-06-21 DIAGNOSIS — F41.9 ANXIETY: ICD-10-CM

## 2023-06-21 DIAGNOSIS — I10 BENIGN ESSENTIAL HYPERTENSION: ICD-10-CM

## 2023-06-21 DIAGNOSIS — I10 ESSENTIAL HYPERTENSION: ICD-10-CM

## 2023-06-21 RX ORDER — FUROSEMIDE 40 MG/1
40 TABLET ORAL DAILY
Qty: 90 TABLET | Refills: 0 | Status: SHIPPED | OUTPATIENT
Start: 2023-06-21

## 2023-06-21 RX ORDER — LORAZEPAM 1 MG/1
1 TABLET ORAL EVERY 6 HOURS PRN
Qty: 90 TABLET | Refills: 0 | Status: SHIPPED | OUTPATIENT
Start: 2023-06-21

## 2023-06-24 DIAGNOSIS — N30.10 INTERSTITIAL CYSTITIS: ICD-10-CM

## 2023-06-24 DIAGNOSIS — R39.82 CHRONIC BLADDER PAIN: ICD-10-CM

## 2023-06-26 DIAGNOSIS — M54.2 NECK PAIN: ICD-10-CM

## 2023-06-26 RX ORDER — PHENAZOPYRIDINE HYDROCHLORIDE 200 MG/1
200 TABLET, FILM COATED ORAL 3 TIMES DAILY PRN
Qty: 30 TABLET | Refills: 0 | Status: SHIPPED | OUTPATIENT
Start: 2023-06-26

## 2023-06-26 RX ORDER — TRAMADOL HYDROCHLORIDE 50 MG/1
100 TABLET ORAL EVERY 8 HOURS PRN
Qty: 60 TABLET | Refills: 3 | Status: SHIPPED | OUTPATIENT
Start: 2023-06-26

## 2023-06-28 ENCOUNTER — OFFICE VISIT (OUTPATIENT)
Dept: NEUROLOGY | Facility: CLINIC | Age: 63
End: 2023-06-28
Payer: COMMERCIAL

## 2023-06-28 ENCOUNTER — PATIENT MESSAGE (OUTPATIENT)
Dept: FAMILY MEDICINE CLINIC | Facility: CLINIC | Age: 63
End: 2023-06-28

## 2023-06-28 VITALS
HEART RATE: 75 BPM | DIASTOLIC BLOOD PRESSURE: 79 MMHG | BODY MASS INDEX: 32.44 KG/M2 | HEIGHT: 64 IN | WEIGHT: 190 LBS | TEMPERATURE: 97.7 F | SYSTOLIC BLOOD PRESSURE: 156 MMHG

## 2023-06-28 DIAGNOSIS — R29.810 FACIAL WEAKNESS: ICD-10-CM

## 2023-06-28 DIAGNOSIS — G43.709 CHRONIC MIGRAINE WITHOUT AURA WITHOUT STATUS MIGRAINOSUS, NOT INTRACTABLE: ICD-10-CM

## 2023-06-28 DIAGNOSIS — R47.9 SPEECH DISORDER: ICD-10-CM

## 2023-06-28 DIAGNOSIS — G93.2 IIH (IDIOPATHIC INTRACRANIAL HYPERTENSION): Primary | ICD-10-CM

## 2023-06-28 DIAGNOSIS — I10 BENIGN ESSENTIAL HYPERTENSION: Primary | ICD-10-CM

## 2023-06-28 PROCEDURE — 99215 OFFICE O/P EST HI 40 MIN: CPT | Performed by: PSYCHIATRY & NEUROLOGY

## 2023-06-28 RX ORDER — ACETAZOLAMIDE 125 MG/1
TABLET ORAL
Qty: 240 TABLET | Refills: 6 | Status: SHIPPED | OUTPATIENT
Start: 2023-06-28

## 2023-06-28 RX ORDER — DEXAMETHASONE 4 MG/1
TABLET ORAL
Qty: 5 TABLET | Refills: 0 | Status: SHIPPED | OUTPATIENT
Start: 2023-06-28

## 2023-06-28 NOTE — PROGRESS NOTES
Review of Systems   Constitutional: Negative for appetite change and fever  HENT: Negative  Negative for hearing loss, tinnitus, trouble swallowing and voice change  Eyes: Negative  Negative for photophobia and pain  Respiratory: Negative  Negative for shortness of breath  Cardiovascular: Negative  Negative for palpitations  Gastrointestinal: Negative  Negative for nausea and vomiting  Endocrine: Negative  Negative for cold intolerance  Genitourinary: Negative  Negative for dysuria, frequency and urgency  Musculoskeletal: Negative  Negative for myalgias and neck pain  Skin: Negative  Negative for rash  Neurological: Positive for numbness and headaches  Negative for dizziness, tremors, seizures, syncope, facial asymmetry, speech difficulty, weakness and light-headedness  Numbness in left side of face   Hematological: Negative  Does not bruise/bleed easily  Psychiatric/Behavioral: Positive for sleep disturbance  Negative for confusion and hallucinations  All other systems reviewed and are negative

## 2023-06-28 NOTE — PROGRESS NOTES
[facility] [facility] Consult - Follow up   PATIENT:  Marcus Hemphill  MRN:  [id number]  :  1960  DATE OF SERVICE:  2023  REFERRED BY: No ref  provider found  PMD: Domitila Kenney DO    Assessment/Plan:   Marcus Hemphill is a very pleasant  70 y o  female with a past medical history that includes  migraines, anxiety, depression, hypertension, chronic pain syndrome on chronic opioids (tramadol), hypertensive heart disease, peripheral vascular disease, gastric ulcer without hemorrhage or perforation, GERD, mesenteric artery thrombosis, stenosis of left carotid artery (followed by Cardiology) allergic rhinitis, vasomotor rhinitis, Moderate CAMILLE on CPAP, (home sleep study 2020 DENZEL 14), COPD, asthma, chronic neck pain, chronic back pain, lumbar radiculopathy, arthritis, atopic dermatitis, interstitial cystitis, patellar tendinitis, hyperlipidemia, insomnia, RLS, bilateral age-related macular degeneration, angioedema, hair loss, chronic fatigue, B12 deficiency, diverticulitis, paroxysmal atrial fibrillation on Eliquis referred here for evaluation of headache  My initial evaluation 2022    Chronic Migraine without aura and with status migrainosus, not intractable  Moderate CAMILLE not on CPAP, (home sleep study 2020 DENZEL 14)  IIH (idiopathic intracranial hypertension)  She reports a long history of headaches and migraines that seemed to increase around perimenopause in her 45s and then again in the past few years  As of initial visit 2022 she reports chronic daily headaches for 2-3 years and worse over the past few months, likely due to stopping using her CPAP machine for the past few months  In 2022, she was hospitalized and evaluated by Neurology for stroke-like symptoms in the setting of migraine with negative MRI and symptoms lasting over 24 hours not consistent with TIA either, per inpatient neurology thought to be migraine with left-sided paresthesias    She is on Eliquis already and also takes baby aspirin daily, and as of initial visit multiple other NSAIDs which we discussed are likely not recommended considering the Eliquis  She reports migraines are typically left retro-orbital pressure radiating into the left temporal region and less severe on the right temporal region  She denies aura reports typical associated migrainous features with some unilateral autonomic features, less likely trigeminal autonomic cephalalgia  - as of 11/14/2022:  Chronic daily headache for 2-3 years, worse migraine at least 3 days a week  Recommended she start using her CPAP machine again as this has significant morbidity and mortality not used  Recommended she discontinue Fioricet which she is taking up to 1-4 times in a day and typically daily, we discussed gradually wean off over the next days with goal of completely getting her off as this can increase migraines  This also has significant caffeine in it that could be impacting her sleep and other conditions such as urinary symptoms  She is also taking daily benzos for abdominal pain and daily benzos for anxiety as well as daily tramadol and recommend she discuss these with her providers if there are other options  Trial of emgality for prevention    - as of 4/17/2023: She chose not yet start Emgality for migraine prevention, has in her fridge  She reports she is taking Fioricet 3-4 times a day as well as Tylenol 3 times a day and we discussed Fioricet also has Tylenol in it, she was able to discontinue other p o  NSAIDs and other excess caffeine thankfully  We discussed again in detail weaning off Fioricet (which I NEVER recommend for headaches and is being prescribed by her PCP) and starting Emgality for migraine prevention    We also discussed the significant morbidity and mortality of untreated sleep apnea and how this is likely contributing to most of her symptoms and she is awaiting follow-up sleep study and sleep medicine follow-up  We discussed considering adding rescue medication for migraine once migraines are more episodic   - as of 6/28/2023: She finally did her loading dose of Emgality 6/4/2023 and had possible side effects afterwards that I suspect were worsening of IIH and cervical medullary compression and therefore we will discontinue and start trial of acetazolamide/Diamox with titration up  Decadron helped earlier this month and will refill for acutely or episodically when symptoms flare  Nan  is not helping significantly and costing her plan a lot of money  She was able to get herself off of Fioricet and we discussed okay to take Excedrin tension episodically, although she feels it makes her interstitial cystitis worse and therefore she is taking acetaminophen  We also discussed the morbidity and mortality of untreated sleep apnea and how that is likely what is driving the IIH and encouraged her to continue to try and use any time sleeping  Workup:  -MRI brain without contrast 2/21/2023: 1  No acute infarction, intracranial hemorrhage or mass effect  2   Mild, chronic microangiopathy  *On my retrospective review from 6/28/2023 we discussed I do see multiple signs of increased intracranial pressure including empty sella, prominent optic nerve sheaths with tortuous optic nerves, flattened posterior sclera  - MRI brain without contrast 09/01/2022: No evidence of acute infarct, intracranial hemorrhage or mass  - CTA head and neck with without contrast 09/01/2022: 1  No intracranial large vessel occlusion or critical stenosis  No aneurysm  2   Progression of atherosclerotic disease in the left cervical carotid artery with a new linear intraluminal defect at the origin/proximal ICA suggesting an ulcerated plaque resulting in about 80% stenosis of proximal ICA  3   Stable atherosclerotic change of right cervical carotid artery without hemodynamically significant stenosis    - carotid ultrasound 10/07/2022: RIGHT: There is <50% stenosis noted in the internal carotid artery  Plaque is Heterogenous  Vertebral artery flow is antegrade  There is no significant subclavian artery disease  LEFT: There is >70% stenosis noted in the internal carotid artery  Plaque is heterogenous  Vertebral artery flow is antegrade  There is no significant subclavian artery disease   - MRI brain with without contrast 10/20/2018 for headache with facial paresthesias: Scattered anterior bifrontal white matter T2 and FLAIR hyperintense foci which are nonspecific but suspicious for mild chronic microangiopathic changes such as may accompany migraine headaches  Findings are stable  No pathologic enhancement  No acute ischemic disease identified by diffusion imaging    Preventative:  - we discussed headache hygiene and lifestyle factors that may improve headaches  -   Trial of  acetaZOLAMIDE (DIAMOX) 125 mg tablet; Take 1 tab PO in a m  and in p m  for 1 week, then 1 tab in a m  and 2 tabs in p m  for 1 week, then 2 tabs in a m  and 2 tabs in p m  for 1 week, then 2 tabs in a m  and 3 tabs in p m  for 1 week, then 3 tabs in a m  and 3 tabs in p m  for 1 week, then 3 tabs in a m  and 4 tabs in p m  for 1 week, then 4 tabs in a m  and 4 tabs in p m  and continue  Discussed proper use, possible side effects and risks  - Currently on through other providers:  Furosemide/Lasix 40 mg, spironolactone/Aldactone 25 mg BID, metoprolol 100 mg b i d , diltiazem 180 mg   - Past/ failed/contraindicated: ACE inhibitors anaphylaxis, valsartan side effects, Olmesartan side effects, metoprolol, Propranolol contraindicated due to history of asthma and interaction with current medications, Amitriptyline, paroxetine/paxil, bupropion/wellbutrin, citalopram/celexa, Gabapentin   - future options:   Alternative CGRP med, botox    Rescue:  - discussed not taking over-the-counter or prescription pain medications more than 3 days per week to prevent medication overuse/rebound headache  -   Trial of  dexamethasone (DECADRON) 4 mg tablet; 1 tab PO with breakfast for 1 to 5 days for unrelenting migraine  Discussed proper use, possible side effects and risks  - Currently on through other providers: No longer taking 3-4 Fioricet a day (none), (325) Tylenol, tramadol 2 tabs 0-2 times a day for stomach or bladder pain for IC, Librax chlordiazepoxide-clidinium (anti spasmodic agent/benzo) - prn abdominal pain 1-2 times a day - has not taken in a while,  ondansetron for nausea  - Past/ failed/contraindicated: Toradol has helped but she is on Eliquis and contraindicated, sumatriptan, rizatriptan she thinks helped  - future options:  Could consider Triptan (no history of stroke and does not appear to have CAD), prochlorperazine,  could consider trial of 5 days of Depakote 500 mg nightly or dexamethasone 2 mg daily for prolonged migraine, ubrelvy, reyvow, nurtec      Patient instructions       Please do follow-up with the sleep medicine team and treat the sleep apnea anytime you are sleeping as this is a very important issue that is likely contributing to many of your other conditions that we are medicating  Continue to try and sleep on your side, consider zzoma pillow      I recommend you see one of the following sleep providers:  Tricia Fabry MD Donnetta Keen MD Estela Laurence MD Jewell Bailer CRNP Crecencio Levins PA-C      Please discuss with your cardiologist tomorrow that we are starting acetazolamide/Diamox which does not technically interact with the variety of other heart/BP meds you are on, but there are multiple diuretics and I agree if cardiology and PCP on board if we could take 1 or more away or decrease them as this increases that would be helpful  I worry about lowering your blood pressure or electrolyte disturbances      Please do follow-up with eye doctor for dilated eye exam and please let me know if they see any evidence of papilledema or swelling behind your eyes because I believe you have idiopathic intracranial hypertension although may not be bad enough to be causing papilledema, but I have found a pattern where I catch you earlier and we are starting treatment today, but I may rapidly increased treatment or change a few things if you did have papilledema  Do not trust that they will send me the note  Do what ever you can to treat your sleep apnea as this is likely what is contributing to other chronic pain and carries with it significant morbidity and mortality if it remains untreated  Headache/migraine treatment:   Rescue medications (for immediate treatment of a headache): It is ok to take acetaminophen  if they help your headaches you should limit these to No more than 3 times a week to avoid medication overuse/rebound headaches  -     dexamethasone (DECADRON) 4 mg tablet; 1 tab PO with breakfast for 1 to 5 days for unrelenting migraine         Prescription preventive medications for headaches/migraines   (to take every day to help prevent headaches - not to take at the time of headache):  [x] stop Emgality    I suggest trial of lower dose diamox than we typically use in more severe cases -  -     acetaZOLAMIDE (DIAMOX) 125 mg tablet; Take 1 tab PO in a m  and in p m  for 1 week, then 1 tab in a m  and 2 tabs in p m  for 1 week, then 2 tabs in a m  and 2 tabs in p m  for 1 week, then 2 tabs in a m  and 3 tabs in p m  for 1 week, then 3 tabs in a m  and 3 tabs in p m  for 1 week, then 3 tabs in a m  and 4 tabs in p m  for 1 week, then 4 tabs in a m  and 4 tabs in p m  and continue        -If you had papilledema or swelling behind your eyes we would rapidly get you up to 500 mg twice a day and may need to go higher  I have 1 patient who is up to 3000 mg in a day just for reference and I will be starting you on 125 mg twice a day        - if a lower dose is helpful, can stay lower, if higher dose causes side effects, go back to last tolerated dose  If you get all the way up to the dose recommended and is not helping enough let me know as I will absolutely go higher     - make sure to stay hydrated while on this as can cause dehydration since that is it's purpose to take fluid off    - most common side effect is tingling of the nerves at times  - can cause electrolyte disturbances, but not typically in any significant way  However, be cautious if taking with other meds that lower potassium like hydrochlorothiazide etc      It is important to try and eat potassium rich foods while taking topiramate  Potassium rich foods include:  - bananas, yogurt, sweet potatoes, tomato sauces, beat greens, weight bearing, kidney and lima beans, lentils, split peas, soybeans, prunes, carrot or Orange juice, fish, milk    *Typically these types of medications take time until you see the benefit, although some may see improvement in days, often it may take weeks, especially if the medication is being titrated up to a beneficial level  Please contact us if there are any concerns or questions regarding the medication  Lifestyle Recommendations:  [x] SLEEP - Maintain a regular sleep schedule: Adults need at least 7-8 hours of uninterrupted a night  Maintain good sleep hygiene:  Going to bed and waking up at consistent times, avoiding excessive daytime naps, avoiding caffeinated beverages in the evening, avoid excessive stimulation in the evening and generally using bed primarily for sleeping  One hour before bedtime would recommend turning lights down lower, decreasing your activity (may read quietly, listen to music at a low volume)  When you get into bed, should eliminate all technology (no texting, emailing, playing with your phone, iPad or tablet in bed)  [x] HYDRATION - Maintain good hydration  Drink  2L of fluid a day (4 typical small water bottles)  [x] DIET - Maintain good nutrition   In particular don't skip meals and try and eat healthy balanced meals regularly  [x] TRIGGERS - Look for other triggers and avoid them: Limit caffeine to 1-2 cups a day or less  Avoid dietary triggers that you have noticed bring on your headaches (this could include aged cheese, peanuts, MSG, aspartame and nitrates)  [x] EXERCISE - physical exercise as we all know is good for you in many ways, and not only is good for your heart, but also is beneficial for your mental health, cognitive health and  chronic pain/headaches  I would encourage at the least 5 days of physical exercise weekly for at least 30 minutes  Education and Follow-up  [x] Please call with any questions or concerns  Of course if any new concerning symptoms go to the emergency department  [x] Follow up 2-3 months, sooner if needed        CC: We had the pleasure of evaluating Angie Maya in neurological consultation today  Angie Maya is a   right handed female who presents today for evaluation of headaches  History obtained from patient as well as available medical record review  History of Present Illness:   Interval history as of 6/28/2023  - Moderate CAMILLE not on CPAP, (home sleep study 12/2020 DENZEL 14)    -MRI brain without contrast 2/21/2023: 1  No acute infarction, intracranial hemorrhage or mass effect  2   Mild, chronic microangiopathy  *On my retrospective review from 6/28/2023 we discussed I do see multiple signs of increased intracranial pressure including empty sella, prominent optic nerve sheaths with tortuous optic nerves, flattened posterior sclera, palor more of right nerve       Headaches and migraines   She reports she stopped taking Emgality because it was so expensive and she felt weird when she took the first time as detailed below  She initially thought the Ubrelvy samples from PCP were supposed to be taken every day and it helped at first but then stopped working    She was able to wean herself off the Fioricet but her headaches are worse off of it and she is "getting daily headaches on the left as well as facial paresthesias  Preventative:   -Trial of Emgality-2 shot loading dose started 6/4/2023 and headache started shortly after and she woke up the next morning with severe migraine and took [place] and [place] and reported nausea and eyes feeling off like she cannot focus and took Phenergan for the nausea and was able to eat something  She also reported shortness of breath started after the Emgality which is somewhat better the next day and she took her asthma inhaler also reporting feels blocked up, no rash or itchiness and had cardiology follow-up that day BP was 113/73, HR 59 -through the nurses I mentioned that is likely untreated sleep apnea causing IIH and she reported she stopped using CPAP as she felt she could not sleep well with it and feels like she wakes up refreshed in the morning and she supposed to go for home sleep study but sleep medicine never called to schedule it  Per nurse note \"Within 45 min to a hour of taking it, she felt lightheaded and sob  She got really stuffy  Her bp dropped  from 122/79 to 108/64  States that the only thing different was the emgality shot  \"    - Currently on through other providers:  Furosemide/Lasix 40 mg (PCP), spironolactone/Aldactone 25 mg BID (PCP), metoprolol 100 mg b i d  (PCP), diltiazem 180 mg  (cards)      Abortive:   -Trial of Decadron 4 mg for 5 days sent 6/5/2023 - helped - took 4 and ate a lot - everything got better after a few days   -PCP has her on 80 tabs Fioricet discussed how to wean off and she was able to stop instead of weaning off as of a week ago and when she retried it made the headache worse  -PCP gave samples of [place] and [place] which she stated worked well and asked us to order   -trial of [place] and [place] PA has been approved through 12/31/23 - took daily for a while   - exedrin helps more maybe  - sticking with tylenol more since caffeine worsens IC      Interval history as of 4/17/2023  - no significant new or " "concerning neurologic symptoms since last visit   -4/11/2023 followed up with VANDANA Castro for other neurologic issues -unclear what happened during this visit as the note is not complete as of todays visit  - Moderate CAMILLE on CPAP, (home sleep study 12/2020 DENZEL 14) - still not treating, did follow-up with sleep medicine and has a follow-up sleep study  - cut out caffeine except for fioricet   - following with PT, ST, pelvic floor therapies  - plans to go into get the carotid artery taken care of     Headaches and migraines   3-5 headaches a week that she takes Tylenol and Fioricet for which works half the time and we discussed I highly do not recommend this as this makes headaches worse  3 Tylenol and 3-4 Fioricet a day despite our discussion last visit  Preventative:   - did not start emgality - too scared   - Currently on through other providers:  Furosemide/Lasix 40 mg, spironolactone/Aldactone 25 mg BID, metoprolol 100 mg b i d , diltiazem 180 mg     Abortive:   - 4 (325) Tylenol and 3-4 Fioricet a day despite our discussion last visit  - Currently on through other providers: tramadol 2 tabs 2 times a day for stomach or bladder pain for IC, Librax chlordiazepoxide-clidinium (anti spasmodic agent/benzo) - prn abdominal pain 1-2 times a day - has not taken in a while,  ondansetron for nausea  Denies bothersome side effects        History as of initial visit 11/14/2022: On 09/01/2022, she went to bed around midnight with mild left facial numbness and woke at 6 that morning with left facial, truncal, arm and leg numbness as well as perceived left leg weakness and felt like speech was \"off\" associated with 5/10 migraine with nausea  Took her morning med Eliquis along with Tylenol and Zofran went to PCP who alerted EMS and sent her to the emergency department for possible stroke  She arrived with elevated blood pressure , and IHSS 1 for sensory deficits    Noncontrast head CT, CTA head and neck were " unremarkable for acute pathology and significant stenosis  Stable atherosclerosis  While she was examined by Neurology in the ED she reported improvement in sensory deficits and reported similar migraine 08/29/2022 with left jaw numbness however did not seek medical care at that time  Per neurology note etiology thought to be related to migraine as MRI was without acute pathology  The next day during hospitalization still had migraines and given another dose of migraine cocktail  Presentation not consistent with TIA given symptoms lasted more than 24 hours  Headaches started at what age? Some when younger   How often do the headaches occur?   - sinusitis when older, headaches around menses when younger   - menopause early 40's then migraines increased   - as of 11/14/2022: chronic daily headache for 2-3 years, worse 3 times a week at least   What time of the day do the headaches start? Wakes up with them sometimes, morning or afternoon   How long do the headaches last? For worse all day without meds    Aura? without aura     Last eye exam: 2021 - wears glasses, macular degeneration     Where is your headache located and pain quality?   - left eye retro-orbital pressure and to the left temporal region, pulsating  - a little on the right temporal is not as bad       What is the intensity of pain? Average: 7/10, worst 10/10  Associated symptoms:   [x] Nausea       [] Vomiting         [x] Photophobia     [x]Phonophobia      [x] Osmophobia  [x] Blurred vision - left   [x] Light-headed or dizzy  - sometimes   [] Tinnitus   [x] Hands or feet tingle or feel numb/paresthesias  - yes see HPI  [] Ptosis      [] Facial droop  [x] Lacrimation - left  [x] Nasal congestion/rhinorrhea      Things that make the headache worse? No specific movements, any movement     Headache triggers:  Hormonal in the past, weather changes, stress       Have you seen someone else for headaches or pain?  Yes, neurology   Are you current pregnant or planning on getting pregnant? no  Have you ever had any Brain imaging? Yes    What medications do you take or have you taken for your headaches? ABORTIVE/pertinent p r n  Meds:      Usually tylenol and motrin - not supposed to be on due to blood thinner and ulcer   exedrin migraine - when not taking fioricet     Fioricet (barbiturate, acetaminophen, caffeine pill) - takes daily for the past couple of weeks, 1 this morning, 1 this afternoon, 3-4 a day - sees it wearing off     Librax - chlordiazepoxide-clidinium (anti spasmodic agent/benzo) - prn abdominal pain 1-2 times a day - 2 at most     Tramadol for stomach or bladder - 3 a day since last year     Ondansetron for nausea     Lorazepam/Ativan 1 mg q 8 hours p r n  Anxiety - 2-3 times a day     Past  toradol has helped  Rizatriptan - helped she thinks  Sumatriptan 100 mg    PREVENTIVE/pertinent daily meds:     Furosemide/Lasix  Spironolactone/Aldactone  Metoprolol 100 mg b i d    Diltiazem    Past/ failed/contraindicated:  ACE inhibitors anaphylaxis  Valsartan side effects  Olmesartan side effects  Propranolol contraindicated due to history of asthma and interaction with current medications  Amitriptyline   paxil  wellbutrin  celexa  Gabapentin         LIFESTYLE  Sleep last visit with sleep medicine 09/03/2021  Moderate CAMILLE on CPAP, (home sleep study 12/2020 DENZEL 14) - stopped using CPAP 4 months ago   - averages: 4-5 hours   Problems falling asleep?:   Yes  Problems staying asleep?:  Yes    Water: 4 bottles per day  Caffeine: none except for exedrin and fioricet      Mood: no job since 2020, a lot of stress and 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, but went back and loved that job and the stent issue came up so she was terminated  Anxiety, depression is not bad with the meds and breathing     The following portions of the patient's history were reviewed and updated as appropriate: allergies, current medications, past family history, past medical history, "past social history, past surgical history and problem list     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 [employer]   Education: [employer] alone    Past Medical History:     Past Medical History:   Diagnosis Date   • A-fib ([facility] ) 03/2020   • Allergic    • Anxiety    • Arthritis    • Asthma    • Back pain     and muscle pain   • Bruises easily    • Chronic pain disorder     Interstitial pain   • Colon polyp    • Dental bridge present    • Depression    • Eczema    • GERD (gastroesophageal reflux disease)    • Heart murmur    • History of blood clots     per pt \"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\"   • History of pneumonia    • Hives    • Hyperlipidemia    • Hypertension    • Infertility, female    • Interstitial cystitis    • Migraine     sees neurologist   • Motion sickness    • Palpitations    • PONV (postoperative nausea and vomiting)    • Psychiatric disorder    • TIA (transient ischemic attack) 09/2022    per pt --\"had MRI and 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\"   • Urinary incontinence     wears Pads   • Wears glasses        Patient Active Problem List   Diagnosis   • Acute upper respiratory infection   • Allergic rhinitis   • [facility] ([facility]   • Anxiety and depression   • Arthritis   • Asthma due to seasonal allergies   • Attention disturbance   • Benign essential hypertension   • Interstitial cystitis   • Chronic low back pain   • Familial hyperlipidemia   • Elevated antinuclear antibody (SON) level   • Elevated blood sugar   • Essential hypertension   • Female pelvic pain   • Hyperlipidemia   • Lipid disorder   • Migraines   • Obesity (BMI 30-39  9)   • Tick bite   • Tobacco abuse   • Venous insufficiency   • Anxiety   • Insomnia   • Snoring   • Cervicalgia   • Headache   • AMD (age-related macular " degeneration), bilateral   • Allergic reaction   • Palpitations and chest pain     • Intertrigo   • Acute gastric ulcer without hemorrhage or perforation   • Tinea corporis   • Gastroesophageal reflux disease without esophagitis   • Allergic conjunctivitis of both eyes   • Intrinsic atopic dermatitis   • Angio-edema   • Vasomotor rhinitis   • Other chest pain   • Low TSH level   • Paroxysmal atrial fibrillation (HCC)   • Vaginal candidiasis   • [place]LL[place] (dyspnea on exertion)   • Hair loss   • Patellar tendinitis of left knee   • Injury of toe on right foot   • Wheezing   • Lumbar radiculopathy   • Chronic fatigue   • Acute pyelonephritis   • CAMILLE (obstructive sleep apnea)   • Hypertensive heart disease with congestive heart failure ([facility])   • Shortness of breath   • Unspecified diastolic (congestive) heart failure ([facility])   • Vitamin B12 deficiency   • Left upper quadrant pain   • Diverticulitis   • 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[facility])   • Hypoxia   • Chronic bronchitis, unspecified chronic bronchitis type ([facility])   • Left arm pain   • Hematoma   • Continuous opioid dependence ([facility])   • COPD (chronic obstructive pulmonary disease) ([facility])   • Moderate persistent asthma without complication   • Urinary retention   • [facility]   • Appendix disease   • S/P appendectomy   • Pain of upper abdomen   • Left upper quadrant abdominal pain   • Recurrent UTI   • Stroke-like symptoms   • Localized swelling of left lower extremity   • Carotid artery stenosis   • Stenosis of left carotid artery   • Asymptomatic stenosis of left carotid artery   • Premature atrial contractions   • Lower abdominal pain   • Hepatic steatosis   • Partial facial nerve palsy   • Morbid obesity ([facility])   • Left lower quadrant abdominal pain   • Numbness   • Facial droop   • Left facial numbness   • Speech disorder   • Injury of left facial nerve   • Paresthesia       Medications:      Current Outpatient Medications   Medication Sig Dispense Refill • acetaZOLAMIDE (DIAMOX) 125 mg tablet Take 1 tab PO in a m  and in p m  for 1 week, then 1 tab in a m  and 2 tabs in p m  for 1 week, then 2 tabs in a m  and 2 tabs in p m  for 1 week, then 2 tabs in a m  and 3 tabs in p m  for 1 week, then 3 tabs in a m  and 3 tabs in p m  for 1 week, then 3 tabs in a m  and 4 tabs in p m  for 1 week, then 4 tabs in a m  and 4 tabs in p m  and continue  240 tablet 6   • Ascorbic Acid (vitamin C) 1000 MG tablet Take 1,000 mg by mouth daily     • aspirin 81 mg chewable tablet Chew 1 tablet (81 mg total) daily  0   • Biotin w/ Vitamins C & E (HAIR/SKIN/NAILS PO) Take by mouth     • chlordiazepoxide-clidinium (LIBRAX) 5-2 5 mg per capsule Take 1 capsule by mouth 4 (four) times a day (before meals and at bedtime) (Patient taking differently: Take 1 capsule by mouth as needed) 120 capsule 5   • Cholecalciferol 25 MCG (1000 UT) tablet Take 1,000 Units by mouth daily     • clotrimazole-betamethasone (LOTRISONE) 1-0 05 % cream      • dexamethasone (DECADRON) 4 mg tablet 1 tab PO with breakfast for 1 to 5 days for unrelenting migraine 5 tablet 0   • diltiazem (DILACOR XR) 240 MG 24 hr capsule Take 1 capsule (240 mg total) by mouth daily 90 capsule 3   • Docusate Sodium (COLACE PO) Take by mouth daily at bedtime     • Eliquis 5 MG TAKE ONE TABLET BY MOUTH TWICE DAILY 180 tablet 0   • famotidine (PEPCID) 20 mg tablet Take 20 mg by mouth daily at bedtime     • fexofenadine (ALLEGRA) 180 MG tablet Take 180 mg by mouth daily     • fluticasone-vilanterol (BREO ELLIPTA) 200-25 MCG/INH inhaler Inhale 1 puff daily Rinse mouth after use   3 Inhaler 3   • furosemide (LASIX) 40 mg tablet Take 1 tablet (40 mg total) by mouth daily 90 tablet 0   • hydrOXYzine HCL (ATARAX) 25 mg tablet TAKE TWO TABLETS BY MOUTH DAILY AT BEDTIME 90 tablet 0   • LORazepam (ATIVAN) 1 mg tablet Take 1 tablet (1 mg total) by mouth every 6 (six) hours as needed for anxiety 90 tablet 0   • metoprolol succinate (TOPROL-XL) 100 mg 24 hr tablet Take 1 tablet (100 mg total) by mouth 2 (two) times a day 180 tablet 2   • montelukast (SINGULAIR) 10 mg tablet TAKE ONE TABLET BY MOUTH DAILY AT BEDTIME 90 tablet 0   • MULTIPLE VITAMIN PO Take by mouth     • omeprazole (PriLOSEC) 40 MG capsule Take 1 capsule (40 mg total) by mouth daily (Patient taking differently: Take 40 mg by mouth 2 (two) times a day) 309 capsule 4   • ondansetron (Zofran ODT) 4 mg disintegrating tablet Take 1 tablet (4 mg total) by mouth every 6 (six) hours as needed for nausea or vomiting 60 tablet 0   • phenazopyridine (PYRIDIUM) 200 mg tablet Take 1 tablet (200 mg total) by mouth 3 (three) times a day as needed for bladder spasms Not to be used for more than 2 consecutive days  30 tablet 0   • Probiotic Product (PROBIOTIC-10 PO) Take 1 tablet by mouth in the morning     • promethazine (PHENERGAN) 25 mg tablet Take 1 tablet (25 mg total) by mouth every 6 (six) hours as needed for nausea or vomiting 60 tablet 1   • Repatha SureClick 916 MG/ML SOAJ Inject 140mg under the skin every 2 weeks  2 mL 11   • spironolactone (ALDACTONE) 25 mg tablet TAKE ONE TABLET BY MOUTH TWICE DAILY 180 tablet 0   • traMADol (Ultram) 50 mg tablet Take 2 tablets (100 mg total) by mouth every 8 (eight) hours as needed for moderate pain 60 tablet 3   • Turmeric 500 MG CAPS      • Zinc 100 MG TABS Take by mouth daily     • butalbital-acetaminophen-caffeine (FIORICET,ESGIC) -40 mg per tablet TAKE ONE TABLET BY MOUTH EVERY 6 HOURS AS NEEDED FOR HEADACHES  (Patient not taking: Reported on 6/28/2023) 90 tablet 1   • Menaquinone-7 (VITAMIN K2 PO) Take 7 Units by mouth if needed (Patient not taking: Reported on 6/28/2023)     • naloxone (NARCAN) 4 mg/0 1 mL nasal spray Administer 1 spray into a nostril  If no response after 2-3 minutes, give another dose in the other nostril using a new spray   (Patient not taking: Reported on 3/28/2023) 1 each 1   • Ubrogepant (UBRELVY) 100 MG tablet Take 1 tablet (100 "mg) one time as needed for migraine  May repeat one additional tablet (100 mg) at least two hours after the first dose  Do not use more than two doses per day (Patient not taking: Reported on 6/28/2023) 16 tablet 11     Current Facility-Administered Medications   Medication Dose Route Frequency Provider Last Rate Last Admin   • cyanocobalamin injection 1,000 mcg  1,000 mcg Intramuscular Q30 Days Gloriabrendenan Sinks, DO   1,000 mcg at 11/28/22 1017        Allergies: Allergies   Allergen Reactions   • Ace Inhibitors Angioedema and Anaphylaxis     Anaphylaxis   • Benicar [Olmesartan] Angioedema     See Allergy note from 9/11/2008  Swollen ankles/legs   • Hydralazine Other (See Comments)     Lip swelling  Flank pain   • Other Anaphylaxis and Other (See Comments)     Other reaction(s): angioadema  Other reaction(s): Other (See Comments)  Preservatives- itching throat closes, hi  Other reaction(s): angioadema  E Z Cat - hives throat closes itching,  Preservatives- itching throat closes, hi  Artificial sweeteners   • Valsartan Angioedema     Lips, face swollen   • Sulfa Antibiotics Hives     stuffiness,itching,hives,throat closing--per pt \"sometimes able to take depending on the brand and the filler or possibly preservatives in it\"   • Ampicillin Hives     Depends on brand some preservatives can react  Has recently tolerated oral amoxicillin     • Motrin [Ibuprofen] Other (See Comments)     Per pt \" told not to take due to A Fib\"   • Wound Dressing Adhesive Other (See Comments)     Per pt Adhesive on EKG leads caused redness       Family History:     Family History   Problem Relation Age of Onset   • Lung cancer Mother 61   • Brain cancer Mother 61   • Diabetes Father    • Depression Father    • Other Father         septic   • Allergies Sister    • Hashimoto's thyroiditis Sister    • Abdominal aortic aneurysm Sister    • Diabetes Sister    • No Known Problems Sister    • No Known Problems Maternal Grandmother    • No Known " Problems Maternal Grandfather    • No Known Problems Paternal Grandmother    • No Known Problems Paternal Grandfather    • Hodgkin's lymphoma Brother    • No Known Problems Brother    • No Known Problems Maternal Aunt    • Diabetes Other        Social History:     Social History     Socioeconomic History   • Marital status:      Spouse name: Not on file   • Number of children: 0   • Years of education: Not on file   • Highest education level: Not on file   Occupational History   • Occupation: unemployed   Tobacco Use   • Smoking status: Former     Packs/day: 0 50     Years: 10 00     Total pack years: 5 00     Types: Cigarettes     Quit date:      Years since quittin 4     Passive exposure: Never   • Smokeless tobacco: Never   Vaping Use   • Vaping Use: Never used   Substance and Sexual Activity   • Alcohol use: Never   • Drug use: No   • Sexual activity: Not on file     Comment: defer   Other Topics Concern   • Not on file   Social History Narrative    Who lives in your home: Alone     What type of home do you live in: Apartment     Age of your home: 1952 built     How long have you been living there: 5 yrs     Type of heat: forced hot air     Type of fuel: electric     What type of jamin is in your bedroom: carpet jamin     Do you have the following in or near your home:    Air products: Humidifier in the bedroom/kitchen     Pests: none     Pets: none     Are pets allowed in bedroom: N/A     Open fields, wooded areas nearby: No     Basement: [id number]-no mold     Exposure to second hand smoke: No    Central air     Habits:    Caffeine: Hot tea 1 cup daily- ice tea 1 cup in the afternoon    Chocolate: Occasionally      Social Determinants of Health     Financial Resource Strain: Low Risk  (2021)    Overall Financial Resource Strain (CARDIA)    • Difficulty of Paying Living Expenses: Not hard at all   Food Insecurity: No Food Insecurity (2023)    Hunger Vital Sign    • "Worried About [address] in the Last Year: Never true    • Ran Out of Food in the Last Year: Never true   Transportation Needs: No Transportation Needs (2/21/2023)    PRAPARE - Transportation    • Lack of Transportation (Medical): No    • Lack of Transportation (Non-Medical): No   Physical Activity: Inactive (5/24/2021)    Exercise Vital Sign    • Days of Exercise per Week: 0 days    • Minutes of Exercise per Session: 0 min   Stress: Not on file   Social Connections:  Moderately Isolated (5/24/2021)    Social Connection and Isolation Panel [NHANES]    • Frequency of Communication with Friends and Family: More than three times a week    • Frequency of Social Gatherings with Friends and Family: More than three times a week    • Attends Hinduism Services: 1 to 4 times per year    • Active Member of Clubs or Organizations: No    • Attends Club or Organization Meetings: Never    • Marital Status: Never    Intimate Partner Violence: Not At Risk (5/24/2021)    Humiliation, Afraid, Rape, and Kick questionnaire    • Fear of Current or Ex-Partner: No    • Emotionally Abused: No    • Physically Abused: No    • Sexually Abused: No   Housing Stability: [address]  (2/21/2023)    Housing Stability Vital Sign    • Unable to Pay for Housing in the Last Year: No    • Number of Places Lived in the Last Year: 1    • Unstable Housing in the Last Year: No         Objective:       Physical Exam:                                                                 Vitals:            Constitutional:    /79 (BP Location: Right arm, Patient Position: Sitting, Cuff Size: Standard)   Pulse 75   Temp 97 7 °F (36 5 °C) (Tympanic)   Ht 5' 4\" (1 626 m)   Wt 86 2 kg (190 lb)   LMP  (LMP Unknown)   BMI 32 61 kg/m²   BP Readings from Last 3 Encounters:   06/28/23 156/79   06/05/23 113/73   06/02/23 146/94     Pulse Readings from Last 3 Encounters:   06/28/23 75   06/05/23 59   06/02/23 60         Well developed, well nourished, " well groomed  Psychiatric:  Normal behavior and appropriate affect        Neurological Examination:     Mental status/cognitive function:   Recent and remote memory appear intact  Attention span and concentration as well as fund of knowledge are appropriate for age  Normal language and spontaneous speech  Cranial Nerves:   VII-facial expression symmetric  Motor Exam: symmetric bulk throughout  no atrophy, fasciculations or abnormal movements noted  Coordination:  no apparent dysmetria, ataxia or tremor noted  Gait: steady casual gait         Pertinent lab results:   See EMR for recent labs  - 09/21/2022 CMP and CBC unremarkable  09/02/2022 A1c 5 6  06/06/2022 vitamin-D for 3 3  Vitamin B12 1,229  TSH normal     Imaging: I have personally reviewed imaging and radiology read   -MRI brain without contrast 2/21/2023: 1  No acute infarction, intracranial hemorrhage or mass effect  2   Mild, chronic microangiopathy  *On my retrospective review from 6/28/2023 we discussed I do see multiple signs of increased intracranial pressure including empty sella, prominent optic nerve sheaths with tortuous optic nerves, flattened posterior sclera  - MRI brain without contrast 09/01/2022: No evidence of acute infarct, intracranial hemorrhage or mass   - CTA head and neck with without contrast 09/01/2022: 1   No intracranial large vessel occlusion or critical stenosis  No aneurysm  2   Progression of atherosclerotic disease in the left cervical carotid artery with a new linear intraluminal defect at the origin/proximal ICA suggesting an ulcerated plaque resulting in about 80% stenosis of proximal ICA  3   Stable atherosclerotic change of right cervical carotid artery without hemodynamically significant stenosis  - carotid ultrasound 10/07/2022: RIGHT: There is <50% stenosis noted in the internal carotid artery  Plaque is Heterogenous  Vertebral artery flow is antegrade   There is no significant subclavian artery disease  LEFT: There is >70% stenosis noted in the internal carotid artery  Plaque is heterogenous  Vertebral artery flow is antegrade  There is no significant subclavian artery disease   - MRI brain with without contrast 10/20/2018 for headache with facial paresthesias: Scattered anterior bifrontal white matter T2 and FLAIR hyperintense foci which are nonspecific but suspicious for mild chronic microangiopathic changes such as may accompany migraine headaches  Findings are stable  No pathologic enhancement  No acute ischemic disease identified by diffusion imaging    Review of Systems:   ROS obtained by medical assistant Personally reviewed and updated if indicated  I recommended PCP follow up for non neurologic problems  Review of Systems   Constitutional: Negative for appetite change and fever  HENT: Negative  Negative for hearing loss, tinnitus, trouble swallowing and voice change  Eyes: Negative  Negative for photophobia and pain  Respiratory: Negative  Negative for shortness of breath  Cardiovascular: Negative  Negative for palpitations  Gastrointestinal: Negative  Negative for nausea and vomiting  Endocrine: Negative  Negative for cold intolerance  Genitourinary: Negative  Negative for dysuria, frequency and urgency  Musculoskeletal: Negative  Negative for myalgias and neck pain  Skin: Negative  Negative for rash  Neurological: Positive for numbness and headaches  Negative for dizziness, tremors, seizures, syncope, facial asymmetry, speech difficulty, weakness and light-headedness  Numbness in left side of face   Hematological: Negative  Does not bruise/bleed easily  Psychiatric/Behavioral: Positive for sleep disturbance  Negative for confusion and hallucinations  All other systems reviewed and are negative      I have spent 45 minutes with Patient  today in which greater than 50% of this time was spent in counseling/coordination of care regarding Diagnostic results, Prognosis, Risks and benefits of tx options, Instructions for management, Patient and family education, Importance of tx compliance, Risk factor reductions, Impressions, Counseling / Coordination of care, Documenting in the medical record, Reviewing / ordering tests, medicine, procedures   and Obtaining or reviewing history    I also spent 20 minutes non face to face for this patient the same day         Author:  Edison Ballard MD 6/28/2023 6:26 PM

## 2023-06-28 NOTE — PATIENT INSTRUCTIONS
Please do follow-up with the sleep medicine team and treat the sleep apnea anytime you are sleeping as this is a very important issue that is likely contributing to many of your other conditions that we are medicating  Continue to try and sleep on your side, consider zzoma pillow      I recommend you see one of the following sleep providers:  Indy Garner PA-C      Please discuss with your cardiologist tomorrow that we are starting acetazolamide/Diamox which does not technically interact with the variety of other heart/BP meds you are on, but there are multiple diuretics and I agree if cardiology and PCP on board if we could take 1 or more away or decrease them as this increases that would be helpful  I worry about lowering your blood pressure or electrolyte disturbances  Please do follow-up with eye doctor for dilated eye exam and please let me know if they see any evidence of papilledema or swelling behind your eyes because I believe you have idiopathic intracranial hypertension although may not be bad enough to be causing papilledema, but I have found a pattern where I catch you earlier and we are starting treatment today, but I may rapidly increased treatment or change a few things if you did have papilledema  Do not trust that they will send me the note  Do what ever you can to treat your sleep apnea as this is likely what is contributing to other chronic pain and carries with it significant morbidity and mortality if it remains untreated  Headache/migraine treatment:   Rescue medications (for immediate treatment of a headache): It is ok to take acetaminophen  if they help your headaches you should limit these to No more than 3 times a week to avoid medication overuse/rebound headaches       -     dexamethasone (DECADRON) 4 mg tablet; 1 tab PO with breakfast for 1 to 5 days for unrelenting migraine         Prescription preventive medications for headaches/migraines   (to take every day to help prevent headaches - not to take at the time of headache):  [x] stop Emgality    I suggest trial of lower dose diamox than we typically use in more severe cases -  -     acetaZOLAMIDE (DIAMOX) 125 mg tablet; Take 1 tab PO in a m  and in p m  for 1 week, then 1 tab in a m  and 2 tabs in p m  for 1 week, then 2 tabs in a m  and 2 tabs in p m  for 1 week, then 2 tabs in a m  and 3 tabs in p m  for 1 week, then 3 tabs in a m  and 3 tabs in p m  for 1 week, then 3 tabs in a m  and 4 tabs in p m  for 1 week, then 4 tabs in a m  and 4 tabs in p m  and continue        -If you had papilledema or swelling behind your eyes we would rapidly get you up to 500 mg twice a day and may need to go higher  I have 1 patient who is up to 3000 mg in a day just for reference and I will be starting you on 125 mg twice a day  - if a lower dose is helpful, can stay lower, if higher dose causes side effects, go back to last tolerated dose  If you get all the way up to the dose recommended and is not helping enough let me know as I will absolutely go higher     - make sure to stay hydrated while on this as can cause dehydration since that is it's purpose to take fluid off    - most common side effect is tingling of the nerves at times  - can cause electrolyte disturbances, but not typically in any significant way   However, be cautious if taking with other meds that lower potassium like hydrochlorothiazide etc      It is important to try and eat potassium rich foods while taking topiramate  Potassium rich foods include:  - bananas, yogurt, sweet potatoes, tomato sauces, beat greens, weight bearing, kidney and lima beans, lentils, split peas, soybeans, prunes, carrot or Orange juice, fish, milk    *Typically these types of medications take time until you see the benefit, although some may see improvement in days, often it may take weeks, especially if the medication is being titrated up to a beneficial level  Please contact us if there are any concerns or questions regarding the medication  Lifestyle Recommendations:  [x] SLEEP - Maintain a regular sleep schedule: Adults need at least 7-8 hours of uninterrupted a night  Maintain good sleep hygiene:  Going to bed and waking up at consistent times, avoiding excessive daytime naps, avoiding caffeinated beverages in the evening, avoid excessive stimulation in the evening and generally using bed primarily for sleeping  One hour before bedtime would recommend turning lights down lower, decreasing your activity (may read quietly, listen to music at a low volume)  When you get into bed, should eliminate all technology (no texting, emailing, playing with your phone, iPad or tablet in bed)  [x] HYDRATION - Maintain good hydration  Drink  2L of fluid a day (4 typical small water bottles)  [x] DIET - Maintain good nutrition  In particular don't skip meals and try and eat healthy balanced meals regularly  [x] TRIGGERS - Look for other triggers and avoid them: Limit caffeine to 1-2 cups a day or less  Avoid dietary triggers that you have noticed bring on your headaches (this could include aged cheese, peanuts, MSG, aspartame and nitrates)  [x] EXERCISE - physical exercise as we all know is good for you in many ways, and not only is good for your heart, but also is beneficial for your mental health, cognitive health and  chronic pain/headaches  I would encourage at the least 5 days of physical exercise weekly for at least 30 minutes  Education and Follow-up  [x] Please call with any questions or concerns  Of course if any new concerning symptoms go to the emergency department    [x] Follow up 2-3 months, sooner if needed

## 2023-06-29 ENCOUNTER — OFFICE VISIT (OUTPATIENT)
Dept: CARDIOLOGY CLINIC | Facility: CLINIC | Age: 63
End: 2023-06-29
Payer: COMMERCIAL

## 2023-06-29 ENCOUNTER — TELEPHONE (OUTPATIENT)
Dept: FAMILY MEDICINE CLINIC | Facility: CLINIC | Age: 63
End: 2023-06-29

## 2023-06-29 VITALS
HEART RATE: 89 BPM | WEIGHT: 192 LBS | SYSTOLIC BLOOD PRESSURE: 108 MMHG | BODY MASS INDEX: 32.96 KG/M2 | DIASTOLIC BLOOD PRESSURE: 84 MMHG

## 2023-06-29 DIAGNOSIS — E78.5 DYSLIPIDEMIA: ICD-10-CM

## 2023-06-29 DIAGNOSIS — R00.2 PALPITATIONS: ICD-10-CM

## 2023-06-29 DIAGNOSIS — I48.0 PAROXYSMAL ATRIAL FIBRILLATION (HCC): Primary | ICD-10-CM

## 2023-06-29 DIAGNOSIS — I10 BENIGN ESSENTIAL HTN: ICD-10-CM

## 2023-06-29 PROCEDURE — 99214 OFFICE O/P EST MOD 30 MIN: CPT | Performed by: INTERNAL MEDICINE

## 2023-06-29 PROCEDURE — 93000 ELECTROCARDIOGRAM COMPLETE: CPT | Performed by: INTERNAL MEDICINE

## 2023-06-29 RX ORDER — DILTIAZEM HYDROCHLORIDE 300 MG/1
300 CAPSULE, COATED, EXTENDED RELEASE ORAL DAILY
Qty: 90 CAPSULE | Refills: 3 | Status: SHIPPED | OUTPATIENT
Start: 2023-06-29 | End: 2023-07-07

## 2023-06-29 NOTE — TELEPHONE ENCOUNTER
"----- Message from Kirstie Gordillo sent at 6/29/2023  7:39 AM EDT -----  Regarding: FW: Vitamin K, B12 shot  Contact: [phone]    ----- Message -----  From: Vijaya Grande \"Adina\"  Sent: 6/28/2023   7:17 PM EDT  To: [facility]  Subject: Vitamin K, B12 shot                              Hi Dr Sang Gar I'm just sending a message to follow up to see if you want me to take the vitamin K  I know you put it in my chart to be called in depending on the blood work  And may I schedule a B12 shot please  ?   Thank you so much,  Adina    "

## 2023-06-29 NOTE — PROGRESS NOTES
"      Cardiology             José Miguel Valdez  1960  [id number]                    Assessment/Plan:     Paroxysmal atrial fibrillation, diagnosed on Holter monitor 03/2020  Hypertension  Multiple drug intolerances and possible allergies  Probable heterozygous familial hypercholesterolemia, on Repatha  Obesity   Sleep apnea, compliant with CPAP therapy   Lower extremity edema, on furosemide, resolved              Heart rate well controlled with PACs on ECG today  Her blood pressure has been high at home with systolic readings in the 239F to 160s  She took an extra 120 mg diltiazem on top of her usual 240 mg dose and felt much better  This morning her blood pressure is better controlled at 108/84  I will increase her diltiazem from 240 mg to 300 mg once daily  She has symptoms of fatigue which she has been feeling lately and is not sure why  She will discuss this further with her PCP who has also been working this up  Recent cortisol level, vitamin B12, vitamin D level and TSH all within normal limits  ECG today reveals sinus rhythm with PACs at 71 bpm   Continue diltiazem  Historically BP has been difficult to control as she has reported intolerances to multiple medications   HCTZ has caused \"throat closing,\" atorvastatin has caused joint pains, valsartan has caused reported angioedema, hydralazine has caused \"kidney pain,\" nifedipine caused headache and nausea, verapamil cause increase in palpitations        Most recent echocardiogram 2/21/2023 with normal left ejection fraction, mild to moderate aortic regurgitation    Most recent nuclear stress test 1/17/2023 with no evidence of myocardial ischemia, no perfusion defects, ejection fraction 71%            Follow-up with me in 3 months              Interval History:      This is a 75-year-old female diagnosed with paroxysmal atrial fibrillation on Holter monitoring 3/020   That time echocardiogram had revealed mild-to-moderate aortic regurgitation with " "normal left ventricular systolic function   Nuclear stress test June 2020 was unremarkable with normal perfusion      She has hypertension, and has multiple medications listed as allergies including beta-blockers, although she has been taking metoprolol for quite some time   Although on her allergy list HCTZ states “throat closing,\" she states that actually cause some ankle swelling along with amlodipine   Atorvastatin caused joint pain, although it is listed in her allergies also as “throat closing  ”  She seems to have had a true allergic reaction to hydralazine and valsartan   She states valsartan caused angioedema, and she does not remember what happened with hydralazine, although states she thinks it may have been ankle swelling      She was last seen by Dr Can Regalado 01/28/2021 at which time she was maintained on metoprolol succinate and spironolactone for hypertension   At 1 point she was asked to take diltiazem as needed for palpitations  Arleene Jimy has been maintained on Eliquis anticoagulation        She underwent a Zio monitor 03/2021 at [facility](should be scanned into media section, personally reviewed rhythm strips) revealing normal sinus rhythm with 8 runs of SVT, longest lasting 17 beats   There were 18 patient triggers, most of which correlated with PACs, short atrial runs, and PVCs  Luz Roses was no evidence of atrial fibrillation      Nuclear stress test 06/2020 was unremarkable     In August 2021 she was hospitalized at [facility] for epigastric and left upper quadrant pain, admitted for superior mesenteric artery thrombosis   She received heparin drip, and underwent SMA stenting with IR on 08/19/2021   She was initiated on Plavix, and continued on Eliquis      During her prior visit 10/15/2021 she was initiated on diltiazem which she later stopped due to headache and nausea   Subsequent nifedipine was tried which caused headache and nausea as well which she stopped on her own   Verapamil caused " increase in palpitations      She went to [facility] due to urinary retention and was reported to be in atrial fibrillation   ZIO monitor 10/2022 revealed episodes of paroxysmal atrial fibrillation which correlated with her triggered symptoms   Echocardiogram 12/16/2022 demonstrated normal left ejection fraction of 55% with mild mitral and aortic regurgitation  Nuclear stress test 1/17/2023 demonstrated no evidence of myocardial ischemia  She was seen by electrophysiology and was initiated on flecainide which she did not tolerate well due to side effects   She refused ablation, therefore was placed on diltiazem      On 1/31/2023 she underwent exploration of left carotid artery for CEA, and this was aborted, as the left carotid bifurcation was high and deep in the neck with the hypoglossal nerve overriding the bifurcation   She went back to the hospital 2/2023 with a left facial droop, 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, and she was discharged      During her prior visit 3/20/2023, diltiazem was increased from 120 mg to 180 mg daily in light of recurrent atrial fibrillation with RVR at 111 bpm   She refused another consultation with electrophysiology  She was unable to tolerate flecainide due to increased palpitations, therefore was reluctant to try any more medications and refused ablation as well      She was started on Diamox thereafter by neurology for signs of increased intracranial pressure on MRI  She presents today for follow-up  She admits to symptoms of fatigue that she has been feeling recently  She states she has a lack of energy  Her blood pressure has been elevated at home with systolic readings in the 705C to 160s  Yesterday, she took an extra 120 mg of diltiazem on top of her usual 240 mg dose and states she felt much better  This morning her blood pressure was 108/84            Vitals:  Vitals:    06/29/23 0936   BP: 108/84   BP Location: Left arm   Patient "Position: Sitting   Cuff Size: Adult   Pulse: 89   Weight: 87 1 kg (192 lb)         Past Medical History:   Diagnosis Date   • A-fib ([facility] [address] ) 2020   • Allergic    • Anxiety    • Arthritis    • Asthma    • Back pain     and muscle pain   • Bruises easily    • Chronic pain disorder     Interstitial pain   • Colon polyp    • Dental bridge present    • Depression    • Eczema    • GERD (gastroesophageal reflux disease)    • Heart murmur    • History of blood clots     per pt \"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\"   • History of pneumonia    • Hives    • Hyperlipidemia    • Hypertension    • Infertility, female    • Interstitial cystitis    • Migraine     sees neurologist   • Motion sickness    • Palpitations    • PONV (postoperative nausea and vomiting)    • Psychiatric disorder    • TIA (transient ischemic attack) 2022    per pt --\"had MRI and 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\"   • Urinary incontinence     wears Pads   • Wears glasses      Social History     Socioeconomic History   • Marital status:      Spouse name: Not on file   • Number of children: 0   • Years of education: Not on file   • Highest education level: Not on file   Occupational History   • Occupation: unemployed   Tobacco Use   • Smoking status: Former     Packs/day: 0 50     Years: 10 00     Total pack years: 5 00     Types: Cigarettes     Quit date: 2019     Years since quittin 4     Passive exposure: Never   • Smokeless tobacco: Never   Vaping Use   • Vaping Use: Never used   Substance and Sexual Activity   • Alcohol use: Never   • Drug use: No   • Sexual activity: Not on file     Comment: defer   Other Topics Concern   • Not on file   Social History Narrative    Who lives in your home: Alone     What type of home do you live in: Apartment     Age of your home: 1952 built     How long have you been living there: 5 yrs     Type of heat: forced hot air     Type of fuel: electric     What type of jamin is in your " bedroom: carpet jamin     Do you have the following in or near your home:    Air products: Humidifier in the bedroom/kitchen     Pests: none     Pets: none     Are pets allowed in bedroom: N/A     Open fields, wooded areas nearby: No     Basement: [id number]-huame-no mold     Exposure to second hand smoke: No    Central air     Habits:    Caffeine: Hot tea 1 cup daily- ice tea 1 cup in the afternoon    Chocolate: Occasionally      Social Determinants of Health     Financial Resource Strain: Low Risk  (5/24/2021)    Overall Financial Resource Strain (CARDIA)    • Difficulty of Paying Living Expenses: Not hard at all   Food Insecurity: No Food Insecurity (2/21/2023)    Hunger Vital Sign    • Worried About Running Out of Food in the Last Year: Never true    • Ran Out of Food in the Last Year: Never true   Transportation Needs: No Transportation Needs (2/21/2023)    PRAPARE - Transportation    • Lack of Transportation (Medical): No    • Lack of Transportation (Non-Medical): No   Physical Activity: Inactive (5/24/2021)    Exercise Vital Sign    • Days of Exercise per Week: 0 days    • Minutes of Exercise per Session: 0 min   Stress: Not on file   Social Connections:  Moderately Isolated (5/24/2021)    Social Connection and Isolation Panel [NHANES]    • Frequency of Communication with Friends and Family: More than three times a week    • Frequency of Social Gatherings with Friends and Family: More than three times a week    • Attends [employer] Services: 1 to 4 times per year    • Active Member of Clubs or Organizations: No    • Attends Club or Organization Meetings: Never    • Marital Status: Never    Intimate Partner Violence: Not At Risk (5/24/2021)    Humiliation, Afraid, Rape, and Kick questionnaire    • Fear of Current or Ex-Partner: No    • Emotionally Abused: No    • Physically Abused: No    • Sexually Abused: No   Housing Stability: Low Risk  (2/21/2023)    Housing Stability Vital Sign    • Unable to Pay for Housing in the Last Year: No    • Number of Places Lived in the Last Year: 1    • Unstable Housing in the Last Year: No      Family History   Problem Relation Age of Onset   • Lung cancer Mother 61   • Brain cancer Mother 61   • Diabetes Father    • Depression Father    • Other Father         septic   • Allergies Sister    • Hashimoto's thyroiditis Sister    • Abdominal aortic aneurysm Sister    • Diabetes Sister    • No Known Problems Sister    • No Known Problems Maternal Grandmother    • No Known Problems Maternal Grandfather    • No Known Problems Paternal Grandmother    • No Known Problems Paternal Grandfather    • Hodgkin's lymphoma Brother    • No Known Problems Brother    • No Known Problems Maternal Aunt    • Diabetes Other      Past Surgical History:   Procedure Laterality Date   • COLONOSCOPY     • DILATION AND CURETTAGE OF UTERUS     • EGD     • EXPLORATORY LAPAROTOMY      for infertility   • HAND SURGERY      Hand excision of tendon cyst   • MT LAPAROSCOPIC APPENDECTOMY N/A 05/03/2022    Procedure: APPENDECTOMY LAPAROSCOPIC;  Surgeon:  Tr Baca MD;  Location: [facility];  Service: General   • MT TEAEC W/PATCH GRF CAROTID VERTB SUBCLAV NECK INC Left 1/31/2023    Procedure: EXPLORATION OF LEFT CAROTID ARTERY; ABORTED ENDARTERECTOMY ARTERY CAROTID;  Surgeon: Dalia Rodriguez DO;  Location: [facility];  Service: Vascular   • SUPERIOR MESTENTERIC ARTERY STENT     • WISDOM TOOTH EXTRACTION         Current Outpatient Medications:   •  acetaZOLAMIDE (DIAMOX) 125 mg tablet, Take 1 tab PO in a m  and in p m  for 1 week, then 1 tab in a m  and 2 tabs in p m  for 1 week, then 2 tabs in a m  and 2 tabs in p m  for 1 week, then 2 tabs in a m  and 3 tabs in p m  for 1 week, then 3 tabs in a m  and 3 tabs in p m  for 1 week, then 3 tabs in a m  and 4 tabs in p m  for 1 week, then 4 tabs in a m  and 4 tabs in p m  and continue , Disp: 240 tablet, Rfl: 6  •  Ascorbic Acid (vitamin C) 1000 MG tablet, Take 1,000 mg by mouth daily, Disp: , Rfl:   •  aspirin 81 mg chewable tablet, Chew 1 tablet (81 mg total) daily, Disp: , Rfl: 0  •  Biotin w/ Vitamins C & E (HAIR/SKIN/NAILS PO), Take by mouth, Disp: , Rfl:   •  chlordiazepoxide-clidinium (LIBRAX) 5-2 5 mg per capsule, Take 1 capsule by mouth 4 (four) times a day (before meals and at bedtime) (Patient taking differently: Take 1 capsule by mouth as needed), Disp: 120 capsule, Rfl: 5  •  clotrimazole-betamethasone (LOTRISONE) 1-0 05 % cream, , Disp: , Rfl:   •  dexamethasone (DECADRON) 4 mg tablet, 1 tab PO with breakfast for 1 to 5 days for unrelenting migraine, Disp: 5 tablet, Rfl: 0  •  diltiazem (CARDIZEM CD) 300 mg 24 hr capsule, Take 1 capsule (300 mg total) by mouth daily, Disp: 90 capsule, Rfl: 3  •  Docusate Sodium (COLACE PO), Take by mouth daily at bedtime, Disp: , Rfl:   •  Eliquis 5 MG, TAKE ONE TABLET BY MOUTH TWICE DAILY, Disp: 180 tablet, Rfl: 0  •  famotidine (PEPCID) 20 mg tablet, Take 20 mg by mouth daily at bedtime, Disp: , Rfl:   •  fexofenadine (ALLEGRA) 180 MG tablet, Take 180 mg by mouth daily, Disp: , Rfl:   •  fluticasone-vilanterol (BREO ELLIPTA) 200-25 MCG/INH inhaler, Inhale 1 puff daily Rinse mouth after use , Disp: 3 Inhaler, Rfl: 3  •  furosemide (LASIX) 40 mg tablet, Take 1 tablet (40 mg total) by mouth daily, Disp: 90 tablet, Rfl: 0  •  hydrOXYzine HCL (ATARAX) 25 mg tablet, TAKE TWO TABLETS BY MOUTH DAILY AT BEDTIME, Disp: 90 tablet, Rfl: 0  •  LORazepam (ATIVAN) 1 mg tablet, Take 1 tablet (1 mg total) by mouth every 6 (six) hours as needed for anxiety, Disp: 90 tablet, Rfl: 0  •  metoprolol succinate (TOPROL-XL) 100 mg 24 hr tablet, Take 1 tablet (100 mg total) by mouth 2 (two) times a day, Disp: 180 tablet, Rfl: 2  •  montelukast (SINGULAIR) 10 mg tablet, TAKE ONE TABLET BY MOUTH DAILY AT BEDTIME, Disp: 90 tablet, Rfl: 0  •  MULTIPLE VITAMIN PO, Take by mouth, Disp: , Rfl:   •  omeprazole (PriLOSEC) 40 MG capsule, Take 1 capsule (40 mg total) by mouth daily (Patient taking differently: Take 40 mg by mouth 2 (two) times a day), Disp: 309 capsule, Rfl: 4  •  ondansetron (Zofran ODT) 4 mg disintegrating tablet, Take 1 tablet (4 mg total) by mouth every 6 (six) hours as needed for nausea or vomiting, Disp: 60 tablet, Rfl: 0  •  phenazopyridine (PYRIDIUM) 200 mg tablet, Take 1 tablet (200 mg total) by mouth 3 (three) times a day as needed for bladder spasms Not to be used for more than 2 consecutive days  , Disp: 30 tablet, Rfl: 0  •  Probiotic Product (PROBIOTIC-10 PO), Take 1 tablet by mouth in the morning, Disp: , Rfl:   •  promethazine (PHENERGAN) 25 mg tablet, Take 1 tablet (25 mg total) by mouth every 6 (six) hours as needed for nausea or vomiting, Disp: 60 tablet, Rfl: 1  •  Repatha SureClick 564 MG/ML SOAJ, Inject 140mg under the skin every 2 weeks  , Disp: 2 mL, Rfl: 11  •  spironolactone (ALDACTONE) 25 mg tablet, TAKE ONE TABLET BY MOUTH TWICE DAILY, Disp: 180 tablet, Rfl: 0  •  traMADol (Ultram) 50 mg tablet, Take 2 tablets (100 mg total) by mouth every 8 (eight) hours as needed for moderate pain, Disp: 60 tablet, Rfl: 3  •  Turmeric 500 MG CAPS, , Disp: , Rfl:   •  Zinc 100 MG TABS, Take by mouth daily, Disp: , Rfl:   •  butalbital-acetaminophen-caffeine (FIORICET,ESGIC) -40 mg per tablet, TAKE ONE TABLET BY MOUTH EVERY 6 HOURS AS NEEDED FOR HEADACHES  (Patient not taking: Reported on 6/28/2023), Disp: 90 tablet, Rfl: 1  •  Cholecalciferol 25 MCG (1000 UT) tablet, Take 1,000 Units by mouth daily (Patient not taking: Reported on 6/29/2023), Disp: , Rfl:   •  Menaquinone-7 (VITAMIN K2 PO), Take 7 Units by mouth if needed (Patient not taking: Reported on 6/28/2023), Disp: , Rfl:   •  naloxone (NARCAN) 4 mg/0 1 mL nasal spray, Administer 1 spray into a nostril  If no response after 2-3 minutes, give another dose in the other nostril using a new spray   (Patient not taking: Reported on 3/28/2023), Disp: 1 each, Rfl: 1  •  Ubrogepant (UBRELVY) 100 MG tablet, Take 1 tablet (100 mg) one time as needed for migraine  May repeat one additional tablet (100 mg) at least two hours after the first dose  Do not use more than two doses per day (Patient not taking: Reported on 6/29/2023), Disp: 16 tablet, Rfl: 11    Current Facility-Administered Medications:   •  cyanocobalamin injection 1,000 mcg, 1,000 mcg, Intramuscular, Q30 Days, Jeff Pollard DO, 1,000 mcg at 11/28/22 1017        Review of Systems:  Review of Systems   Constitutional: Negative for activity change, fever and unexpected weight change  HENT: Negative for facial swelling, nosebleeds and voice change  Respiratory: Negative for chest tightness, shortness of breath and wheezing  Cardiovascular: Negative for chest pain, palpitations and leg swelling  Gastrointestinal: Negative for abdominal distention  Genitourinary: Negative for hematuria  Musculoskeletal: Negative for arthralgias  Skin: Negative for color change, pallor, rash and wound  Neurological: Negative for dizziness, seizures and syncope  Psychiatric/Behavioral: Negative for agitation  Physical Exam:  Physical Exam  Vitals reviewed  Constitutional:       Appearance: She is well-developed  HENT:      Head: Normocephalic and atraumatic  Cardiovascular:      Rate and Rhythm: Normal rate and regular rhythm  Heart sounds: Normal heart sounds  Pulmonary:      Effort: Pulmonary effort is normal       Breath sounds: Normal breath sounds  Abdominal:      Palpations: Abdomen is soft  Musculoskeletal:         General: Normal range of motion  Cervical back: Normal range of motion and neck supple  Skin:     General: Skin is warm and dry  Neurological:      Mental Status: She is alert and oriented to person, place, and time  Psychiatric:         Behavior: Behavior normal          Thought Content:  Thought content normal          Judgment: Judgment normal          This note was completed in part utilizing M-Modal Fluency Direct Software  Grammatical errors, random word insertions, spelling mistakes, and incomplete sentences can be an occasional consequence of this system secondary to software limitations, ambient noise, and hardware issues  If you have any questions or concerns about the content, text, or information contained within the body of this dictation, please contact the provider for clarification

## 2023-06-30 ENCOUNTER — OFFICE VISIT (OUTPATIENT)
Dept: PHYSICAL THERAPY | Facility: REHABILITATION | Age: 63
End: 2023-06-30
Payer: COMMERCIAL

## 2023-06-30 DIAGNOSIS — I63.9 ACUTE CVA (CEREBROVASCULAR ACCIDENT) (HCC): Primary | ICD-10-CM

## 2023-06-30 DIAGNOSIS — R11.0 NAUSEA: ICD-10-CM

## 2023-06-30 DIAGNOSIS — R26.89 BALANCE PROBLEM: ICD-10-CM

## 2023-06-30 DIAGNOSIS — R39.89 BLADDER PAIN: ICD-10-CM

## 2023-06-30 DIAGNOSIS — R53.1 LEFT-SIDED WEAKNESS: ICD-10-CM

## 2023-06-30 DIAGNOSIS — M62.89 PELVIC FLOOR DYSFUNCTION: Primary | ICD-10-CM

## 2023-06-30 DIAGNOSIS — G89.29 CHRONIC ABDOMINAL PAIN: ICD-10-CM

## 2023-06-30 DIAGNOSIS — K59.09 OTHER CONSTIPATION: ICD-10-CM

## 2023-06-30 DIAGNOSIS — R10.9 CHRONIC ABDOMINAL PAIN: ICD-10-CM

## 2023-06-30 PROCEDURE — 97112 NEUROMUSCULAR REEDUCATION: CPT | Performed by: PHYSICAL THERAPIST

## 2023-06-30 PROCEDURE — 97110 THERAPEUTIC EXERCISES: CPT | Performed by: PHYSICAL THERAPIST

## 2023-06-30 PROCEDURE — 97530 THERAPEUTIC ACTIVITIES: CPT | Performed by: PHYSICAL THERAPIST

## 2023-06-30 RX ORDER — PROMETHAZINE HYDROCHLORIDE 25 MG/1
25 TABLET ORAL EVERY 6 HOURS PRN
Qty: 60 TABLET | Refills: 0 | Status: SHIPPED | OUTPATIENT
Start: 2023-06-30 | End: 2023-08-29

## 2023-06-30 NOTE — PROGRESS NOTES
Daily Note     Today's date: 2023  Patient name: Gurdeep Martini  : 1960  MRN: [id number]  Referring provider: Leelee Sánchez, *  Dx:   Encounter Diagnosis     ICD-10-CM    1  Pelvic floor dysfunction  M62 89       2  Other constipation  K59 09       3  Chronic abdominal pain  R10 9     G89 29       4  Bladder pain  R39 89           Start Time: 831  Stop Time: 928  Total time in clinic (min): 57 minutes    Subjective: Pt reports she had an IC flare and she was taking pyridium  She had iced tea and had a flare of bladder pain  Resolved in 3 days  Pt reports since then she has had better bowel movements and daily BMs  Pt reports she did not wear a pad and did not have incontinence episodes with stress or urge incontinence  Pt did try her strengthening exercises at home and did not have any pain with them  She reports she did not do them as often because she was dealing with pain  Objective: See treatment diary below      Assessment: PT progressed LE strengthening this session with SLR's and clamshells and CKC exercises  PT cued pt to avoid compensation with holding her breath however challenged with this  PT cued pt to count out loud during strengthening exercises to allow her to focus on breathing with her exercises  Pt challenged with SLS requiring cueing to avoid compensation with lateral lean, hip drop or leg bracing  Pt most challenged with R>L LE stance  Tolerated treatment fair  Patient demonstrated fatigue post treatment and would benefit from continued PT  Pt is progressing well with improved urinary and bowel control  Plan: Continue per plan of care  Progress note during next visit  Precautions: a-fib, anxiety, HTN  asthma  Impairments/functional limitations: urinary urgency, frequency, leakage, constipation, pelvic pain   Interstitial cystitits  Daily Treatment Diary    Manuals        External assess          Internal assess "  Neuro Re-Ed          TAC  review        PF  review        TAC+PF  Review, with breath Review, with breath       TAC+PF with march  x10 ea alt D/c       TAC+PF with alt knee fall out  x10 ea alt D/c       TAC+PF with ball squeeze  5\"x10 D/c       TAC+PF with SLR   1x5 ea, with counting out loud       TAC+PF with s/l ABD   np       TAC+PF with bridge  3\"x10 3\"x6       TAC+PF with clamshells   3\"x5 R, 3\"x7 L                 SLS   30\"x2 ea with UE support       Hip hike to neutral   review                           360 deg breathing          Ther Ex          Bike   5 mins L1, RPE 4 8 mins L1 RPE 4        Supine piri stretch          LTR          Seated piri stretch          Supine butterfly stretch          Seated hs stretch                    Standing hip ABD   2x5 ea       Standing march    x5 ea                                               Ther Activity          Urge delay:QF review         Urge delay: HR review         Freeze, breathe, squeeze  review Review         The knack nv Review         Bowel mechanics/posture Review  Review        Managing IAP/exhale on exertion Review  Review  review       Healthy bladder habits  Bladder irritants, sipping vs cugging Review  review       Urinary hesitancy   nv                  Gait Training                              Modalities                                            "

## 2023-06-30 NOTE — PROGRESS NOTES
Daily Note/Discharge       Today's date: 2023  Patient name: Neftali Calles  : 1960  MRN: [id number]  Referring provider: Gerardo Cahpin DO  Dx:   Encounter Diagnosis     ICD-10-CM    1  Acute CVA (cerebrovascular accident) ([facility] [address] )  I63 9       2  Left-sided weakness  R53 1       3  Balance problem  R26 89           Start Time: 730   Stop Time: 830  Total time in clinic (min): 60 minutes      Subjective: Reports had not had a good week  I did not do any walking, I was just in bed all week  I can do my exercises at home  Feel good about that  Patient-Specific Functional Scale   Task is scored 0 (unable to perform activity) to 10 (ability to perform activity independently)  Activity    1  Carry the laundry up the steps without difficulty  3 8 8 9   2  Taking the garbage out  3 7 8 9   3  Feel comfortable walking outside without loss of balance  5 10 10 10     Objective: See treatment diary below    BP: 134/72 BP HR 71  Mobility Measures    Assistive device used? None      Walking speed   84  meters/second   89 m/s 1 04 m/s 1 07 m/s   6 Minute Walk Test (100 foot circular course) 500' only 4 minutes 710 RPE, felt SOB  R hip pain 4/10   96% SpO2 hr 98 bpm        600' in 4 minutes   890' in 6 minutes     Limited by L foot pain 5/10   SpO2 96%   HR 46 bpm  1075  HR 81 Spo2 98%  Hip 10 1100 HR 87 Spo2 97%    TUG  n/t Trial 1: 10 51 s  Trial 2: 8 77 s Trial 1: 9 seconds            6 62   5 times sit to stand 14 43 seconds 13 43 s 11 75  11 23   Functional Gait Assessment or Saavedra Balance Scale 15/30 27/30 28/30    Modified Jyoti Scale Score 2 - Slight disability; unable to carry out all previous activity, but able to look after own affairs without assistance 2 - Slight disability     Patient-Reported Outcome Measure:   Stroke Impact Scale  Patient Specific Functional Scale (PSFS)  WNL   MCTSIB     4  foam EC 30s EC Foam   ---       Assessment: Patient was re-evaluated today  Has been having medical work up with good results  She was evaluated by PT on 4/13/23 and has been issued a Hep which includes a walking program   Jairo Aleman was encouraged to continue with increased activity and mobility to prevent deconditioning  Encouraged compliance with HEP  Discused if there is a deterioration of functional mobility or falls, she can contact her primary physician and return to PT   STG's:     - Patient improves EC on foam task of MCSTIB for improved static balance within 4 weeks  - MET    - Patient is independent with initial home exercise program for improvements at home within 2 weeks  - MET    - Patient ambulates for 10 consecutive min with appropriate vital sign response for improved endurance within 4 weeks  - MET  LTG's:   - Patient improves distance ambulated on 4 minute walk by 10% within 8 weeks, and ability to tolerate ambulation for full 6 minutes for improved endurance  - MET   - Patient decreased time to complete 5xSTS by 3 seconds for improved functional strength and decreased risk of falls - MET   - Patient improves score on FGA to at least 22/30 for improved dynamic balance and decreased risk of falls in 8 weeks  - MET  - Patient improved PSFS to >20/30 to demonstrate improved confidence and ability to perform personal goals in 8 weeks  - MET  Updated Goal:  -Patient will be able to ambulate >1200 feet in 6 MWT for increased endurance  (met 1150)    Plan: Transition to HEP  Discharge at this time       Precautions Precautions: a-fib, anxiety, HTN, COPD, closely monitor vital signs     HEP: Sit to stand      Specialty Treatment Diary  4/21 5/9 5/15 5/23 6/09 Re-Asess   See FOM above 6/12 6/20 6/30   Home exercise program             Developed, Reviewed and issued a written copy and texted [employer] [address] program to cell phone   Walking/endurance Biodex  1 0 mph 1 min  1 5 mph 2 min  2 0 mph 7 min  10 min  +  Rest  2 0 mph x 5 min    6MWT     5xSTS  TUG    "Scifit 10 min  65-75 stp     True treadmill  1 3 mph 2  Min  1 5 mph 3 min  2 UE support to 1 UE support Scifit  12 min 75-82 stp     True Treadmill  1 6 mph 2 min  1 9 mph 1 5 min  94 HR  2 4 1 min  1 8 1 min  2 4 1 min Biodex treadmill     1 5 mph x 1 min  1 7 mph x 4 min  1 9 mph x 3 min     * left dorsal aspect of foot was bothering, show was taken off, modified shoelace tension with improved results and ability to walk further on treadmill  BD treadmill    1  9mph x 5min  1  9mph-2 0 x 4min       TM  5% incline  1 8 mph; 5 min  1 9-2 0 mph; 5 min   Biodex treadmill    1 5 2 min  1 8-2 0 10 minutes  8% incline    Hr 82 Spo2 97%        6 MWT   Static and dynamic balance Solostep     Compliant surface management ( object under for increased challenge)   circles x 4  Sideways     100' x 2  HT  HN  180 turns 2# weights  EC   Hurdles 10\"  4 laps         fwd         Sideways     Foam beam x 3 laps ea  Forwards  sideways       FGA  MCTSIB ECFoam     Foam beams 3x  FWD     Added 3 hurdles 3x FWD/Sideways  3x Foam beams  Forwards 3 laps  Sideways 3 laps  Added 10' hurdles with increased challenge  4 frw/4 sideways     100' x 3  180 degree turns     Backwards 100' x 1     Incline surface  Forwards/turn  Forwards/backwards * LOB 3 laps each     Incline forwards downs/step onto compliance surface/ steping down/return  6 laps Solostep     10\" hurdles   4 laps forwards  4 laps sideways     Added foam beam balance   3 laps fwd'/sdw   Solo    10\" Hurdles over foam beams  -4 laps   -4 laps         SOLO    6in hurdles over foam beams ->small compliant ramp on horizontal ->airex-> 12 in hurdles    -6 laps fwd  -3 laps lat MCTSIB    TUG    10\" hurdles  Forwards  Sideways  No hands   Strengthening nv   Sit to stand  5' x 1  #4 10' x 1  5' x 1 Sit to stand with 4# with  10 x 2   Sit to stand with 4# over head   10 x 2    Lateral tap downs 4in, x8 ea Lateral tap downs 4in, 2x10 ea w/ UL fingertips    Stretching                          "

## 2023-07-05 NOTE — PROGRESS NOTES
[address] [facility]  [facility]  [address] [place] [address]  [phone]  [email or web address]      NAME: Latonia Perera  AGE: 70 y.o. SEX: female  : 1960   MRN: [id number]    DATE: 2023  TIME: 3:43 PM    Assessment and Plan:    Chronic urethral and bladder pain, lower abdominal pain, history of interstitial cystitis with history of Hunner's ulcers, and recurrent UTI. Last urinary tract infection was severe and it was in May. She tried Estrace before and had a localized reaction to it and I told her we should try a different formulation such as Premarin  and Friday and try to get through the first 2 weeks of it. She is also wondering if her history of hCG use has affected her hormone levels. We discussed hormone replacement in women, and particularly the new interest in testosterone in women. For this reason we will check a testosterone level, and an estradiol level as well. She will use 1 fingertip dab of Premarin cream to the urethra  and Friday. We will see how she does with this. Premarin cream or Estrace applied to the urethra is the #1 prevention in postmenopausal women for recurrent urinary tract infections. I will set her up for a year appointment or as needed. For her IC and pelvic floor dysfunction symptoms, she will continue with pelvic floor therapy and she is carefully watching her diet. She is able to distinguish between UTI symptoms and IC/bladder pain pelvic floor dysfunction symptoms. Chief Complaint   No chief complaint on file. History of Present Illness   Follow-up chronic urethral bladder pain, lower abdominal pain has a history of interstitial cystitis with a history of Hunner's ulcers.   I last saw her in telemedicine 2023 and she was having right-sided bladder pain with intermittent stream.  I think this was now overlapping with symptomatic recurrent urinary tract infections. We treated her for a Proteus mirabilis infection at that time so she was treated with Levaquin 750 mg daily. I then started her on methenamine 1000 mg p.o. twice daily along with vitamin C. She is unable to tolerate estrogen cream.  She had normal CAT scan with no suggestion of any form of colovesical fistula September 2021. Plan was that if she continued to have recurrent infections we may need to revisit the estrogen cream and see if she can tolerate another formulation and consider repeating a CAT scan. I have always wish to avoid urethral manipulation because this seems to exacerbate things. She was also on hydroxyzine and Prelief. She takes Pyridium 1 to 2/day at the most.  Also have prescribed tramadol for her. Has done pelvic floor physical therapy. She is very pleased with the pelvic floor therapy. She was on hCG treatment for infertility for years when she was younger. She is wondering if this has anything to do with her bladder. She had an E. coli UTI greater than 100,000 colonies May 16, 2023 for which she was treated with Keflex.   "This was a bad 1"    The following portions of the patient's history were reviewed and updated as appropriate: allergies, current medications, past family history, past medical history, past social history, past surgical history and problem list.  Past Medical History:   Diagnosis Date   • A-fib ([facility]) 03/2020   • Allergic    • Anxiety    • Arthritis    • Asthma    • Back pain     and muscle pain   • Bruises easily    • Chronic pain disorder     Interstitial pain   • Colon polyp    • Dental bridge present    • Depression    • Eczema    • GERD (gastroesophageal reflux disease)    • Heart murmur    • History of blood clots     per pt "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"   • History of pneumonia    • Hives    • Hyperlipidemia    • Hypertension    • Infertility, female    • Interstitial cystitis    • Migraine sees neurologist   • Motion sickness    • Palpitations    • PONV (postoperative nausea and vomiting)    • Psychiatric disorder    • TIA (transient ischemic attack) 09/2022    per pt --"had MRI and 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"   • Urinary incontinence     wears Pads   • Wears glasses      Past Surgical History:   Procedure Laterality Date   • COLONOSCOPY     • DILATION AND CURETTAGE OF UTERUS     • EGD     • EXPLORATORY LAPAROTOMY      for infertility   • HAND SURGERY      Hand excision of tendon cyst   • SD LAPAROSCOPIC APPENDECTOMY N/A 05/03/2022    Procedure: APPENDECTOMY LAPAROSCOPIC;  Surgeon: Brittney Perez MD;  Location: [facility];  Service: General   • SD TEAEC W/PATCH GRF [address] 1/31/2023    Procedure: EXPLORATION OF LEFT CAROTID ARTERY; ABORTED ENDARTERECTOMY ARTERY CAROTID;  Surgeon: Carly Hwang DO;  Location: [facility];  Service: Vascular   • [address] ARTERY STENT     • WISDOM TOOTH EXTRACTION       shoulder  Review of Systems   Review of Systems    Active Problem List     Patient Active Problem List   Diagnosis   • Acute upper respiratory infection   • Allergic rhinitis   • Angina pectoris ([address])   • Anxiety and depression   • Arthritis   • Asthma due to seasonal allergies   • Attention disturbance   • Benign essential hypertension   • Interstitial cystitis   • Chronic low back pain   • Familial hyperlipidemia   • Elevated antinuclear antibody (SON) level   • Elevated blood sugar   • Essential hypertension   • Female pelvic pain   • Hyperlipidemia   • Lipid disorder   • Migraines   • Obesity (BMI 30-39. 9)   • Tick bite   • Tobacco abuse   • Venous insufficiency   • Anxiety   • Insomnia   • Snoring   • Cervicalgia   • Headache   • AMD (age-related macular degeneration), bilateral   • Allergic reaction   • Palpitations and chest pain.    • Intertrigo   • Acute gastric ulcer without hemorrhage or perforation   • Tinea corporis   • Gastroesophageal reflux disease without esophagitis   • Allergic conjunctivitis of both eyes   • Intrinsic atopic dermatitis   • Angio-edema   • Vasomotor rhinitis   • Other chest pain   • Low TSH level   • Paroxysmal atrial fibrillation ([facility])   • Vaginal candidiasis   • [place] (dyspnea on exertion)   • Hair loss   • Patellar tendinitis of left knee   • Injury of toe on right foot   • Wheezing   • Lumbar radiculopathy   • Chronic fatigue   • Acute pyelonephritis   • CAMILLE (obstructive sleep apnea)   • Hypertensive heart disease with congestive heart failure ([facility])   • Shortness of breath   • Unspecified diastolic (congestive) heart failure ([facility])   • Vitamin B12 deficiency   • Left upper quadrant pain   • Diverticulitis   • 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[facility])   • Hypoxia   • Chronic bronchitis, unspecified chronic bronchitis type ([facility])   • Left arm pain   • Hematoma   • Continuous opioid dependence ([facility])   • COPD (chronic obstructive pulmonary disease) ([facility])   • Moderate persistent asthma without complication   • Urinary retention   • Peripheral vascular disease ([address])   • Appendix disease   • S/P appendectomy   • Pain of upper abdomen   • Left upper quadrant abdominal pain   • Recurrent UTI   • Stroke-like symptoms   • Localized swelling of left lower extremity   • Carotid artery stenosis   • Stenosis of left carotid artery   • Asymptomatic stenosis of left carotid artery   • Premature atrial contractions   • Lower abdominal pain   • Hepatic steatosis   • Partial facial nerve palsy   • Morbid obesity ([facility])   • Left lower quadrant abdominal pain   • Numbness   • Facial droop   • Left facial numbness   • Speech disorder   • Injury of left facial nerve   • Paresthesia       Objective   LMP  (LMP Unknown)     Physical Exam        Current Medications     Current Outpatient Medications:   •  acetaZOLAMIDE (DIAMOX) 125 mg tablet, Take 1 tab PO in a.m. and in p.m. for 1 week, then 1 tab in a.m. and 2 tabs in p.m. for 1 week, then 2 tabs in a.m. and 2 tabs in p.m. for 1 week, then 2 tabs in a.m. and 3 tabs in p.m. for 1 week, then 3 tabs in a.m. and 3 tabs in p.m. for 1 week, then 3 tabs in a.m. and 4 tabs in p.m. for 1 week, then 4 tabs in a.m. and 4 tabs in p.m. and continue., Disp: 240 tablet, Rfl: 6  •  Ascorbic Acid (vitamin C) 1000 MG tablet, Take 1,000 mg by mouth daily, Disp: , Rfl:   •  aspirin 81 mg chewable tablet, Chew 1 tablet (81 mg total) daily, Disp: , Rfl: 0  •  Biotin w/ Vitamins C & E (HAIR/SKIN/NAILS PO), Take by mouth, Disp: , Rfl:   •  butalbital-acetaminophen-caffeine (FIORICET,ESGIC) -40 mg per tablet, TAKE ONE TABLET BY MOUTH EVERY 6 HOURS AS NEEDED FOR HEADACHES.  (Patient not taking: Reported on 6/28/2023), Disp: 90 tablet, Rfl: 1  •  chlordiazepoxide-clidinium (LIBRAX) 5-2.5 mg per capsule, Take 1 capsule by mouth 4 (four) times a day (before meals and at bedtime) (Patient taking differently: Take 1 capsule by mouth as needed), Disp: 120 capsule, Rfl: 5  •  Cholecalciferol 25 MCG (1000 UT) tablet, Take 1,000 Units by mouth daily (Patient not taking: Reported on 6/29/2023), Disp: , Rfl:   •  clotrimazole-betamethasone (LOTRISONE) 1-0.05 % cream, , Disp: , Rfl:   •  dexamethasone (DECADRON) 4 mg tablet, 1 tab PO with breakfast for 1 to 5 days for unrelenting migraine, Disp: 5 tablet, Rfl: 0  •  diltiazem (CARDIZEM CD) 300 mg 24 hr capsule, Take 1 capsule (300 mg total) by mouth daily, Disp: 90 capsule, Rfl: 3  •  Docusate Sodium (COLACE PO), Take by mouth daily at bedtime, Disp: , Rfl:   •  Eliquis 5 MG, TAKE ONE TABLET BY MOUTH TWICE DAILY, Disp: 180 tablet, Rfl: 0  •  famotidine (PEPCID) 20 mg tablet, Take 20 mg by mouth daily at bedtime, Disp: , Rfl:   •  fexofenadine (ALLEGRA) 180 MG tablet, Take 180 mg by mouth daily, Disp: , Rfl:   •  fluticasone-vilanterol (BREO ELLIPTA) 200-25 MCG/INH inhaler, Inhale 1 puff daily Rinse mouth after use., Disp: 3 Inhaler, Rfl: 3  •  furosemide (LASIX) 40 mg tablet, Take 1 tablet (40 mg total) by mouth daily, Disp: 90 tablet, Rfl: 0  •  hydrOXYzine HCL (ATARAX) 25 mg tablet, TAKE TWO TABLETS BY MOUTH DAILY AT BEDTIME, Disp: 90 tablet, Rfl: 0  •  LORazepam (ATIVAN) 1 mg tablet, Take 1 tablet (1 mg total) by mouth every 6 (six) hours as needed for anxiety, Disp: 90 tablet, Rfl: 0  •  Menaquinone-7 (VITAMIN K2 PO), Take 7 Units by mouth if needed (Patient not taking: Reported on 6/28/2023), Disp: , Rfl:   •  metoprolol succinate (TOPROL-XL) 100 mg 24 hr tablet, Take 1 tablet (100 mg total) by mouth 2 (two) times a day, Disp: 180 tablet, Rfl: 2  •  montelukast (SINGULAIR) 10 mg tablet, TAKE ONE TABLET BY MOUTH DAILY AT BEDTIME, Disp: 90 tablet, Rfl: 0  •  MULTIPLE VITAMIN PO, Take by mouth, Disp: , Rfl:   •  naloxone (NARCAN) 4 mg/0.1 mL nasal spray, Administer 1 spray into a nostril. If no response after 2-3 minutes, give another dose in the other nostril using a new spray. (Patient not taking: Reported on 3/28/2023), Disp: 1 each, Rfl: 1  •  omeprazole (PriLOSEC) 40 MG capsule, Take 1 capsule (40 mg total) by mouth daily (Patient taking differently: Take 40 mg by mouth 2 (two) times a day), Disp: 309 capsule, Rfl: 4  •  ondansetron (Zofran ODT) 4 mg disintegrating tablet, Take 1 tablet (4 mg total) by mouth every 6 (six) hours as needed for nausea or vomiting, Disp: 60 tablet, Rfl: 0  •  phenazopyridine (PYRIDIUM) 200 mg tablet, Take 1 tablet (200 mg total) by mouth 3 (three) times a day as needed for bladder spasms Not to be used for more than 2 consecutive days. , Disp: 30 tablet, Rfl: 0  •  Probiotic Product (PROBIOTIC-10 PO), Take 1 tablet by mouth in the morning, Disp: , Rfl:   •  promethazine (PHENERGAN) 25 mg tablet, Take 1 tablet (25 mg total) by mouth every 6 (six) hours as needed for nausea or vomiting, Disp: 60 tablet, Rfl: 0  •  Repatha SureClick 247 MG/ML SOAJ, Inject 140mg under the skin every 2 weeks. , Disp: 2 mL, Rfl: 11  •  spironolactone (ALDACTONE) 25 mg tablet, TAKE ONE TABLET BY MOUTH TWICE DAILY, Disp: 180 tablet, Rfl: 0  •  traMADol (Ultram) 50 mg tablet, Take 2 tablets (100 mg total) by mouth every 8 (eight) hours as needed for moderate pain, Disp: 60 tablet, Rfl: 3  •  Turmeric 500 MG CAPS, , Disp: , Rfl:   •  Ubrogepant (UBRELVY) 100 MG tablet, Take 1 tablet (100 mg) one time as needed for migraine. May repeat one additional tablet (100 mg) at least two hours after the first dose.  Do not use more than two doses per day (Patient not taking: Reported on 6/29/2023), Disp: 16 tablet, Rfl: 11  •  Zinc 100 MG TABS, Take by mouth daily, Disp: , Rfl:     Current Facility-Administered Medications:   •  cyanocobalamin injection 1,000 mcg, 1,000 mcg, Intramuscular, Q30 Days, Jennifer Knight DO, 1,000 mcg at 11/28/22 1017    Time-15 minutes    Leopoldo Counter, MD

## 2023-07-06 ENCOUNTER — OFFICE VISIT (OUTPATIENT)
Dept: UROLOGY | Facility: MEDICAL CENTER | Age: 63
End: 2023-07-06
Payer: COMMERCIAL

## 2023-07-06 ENCOUNTER — TELEPHONE (OUTPATIENT)
Age: 63
End: 2023-07-06

## 2023-07-06 ENCOUNTER — TRANSCRIBE ORDERS (OUTPATIENT)
Dept: GASTROENTEROLOGY | Facility: CLINIC | Age: 63
End: 2023-07-06

## 2023-07-06 VITALS
OXYGEN SATURATION: 97 % | BODY MASS INDEX: 32.44 KG/M2 | SYSTOLIC BLOOD PRESSURE: 122 MMHG | HEART RATE: 61 BPM | DIASTOLIC BLOOD PRESSURE: 76 MMHG | HEIGHT: 64 IN | WEIGHT: 190 LBS

## 2023-07-06 DIAGNOSIS — N95.9 POSTMENOPAUSAL SYMPTOMS: ICD-10-CM

## 2023-07-06 DIAGNOSIS — N95.2 ATROPHIC VAGINITIS: Primary | ICD-10-CM

## 2023-07-06 PROCEDURE — 99213 OFFICE O/P EST LOW 20 MIN: CPT | Performed by: UROLOGY

## 2023-07-06 RX ORDER — CONJUGATED ESTROGENS 0.62 MG/G
0.5 CREAM VAGINAL EVERY OTHER DAY
Qty: 30 G | Refills: 11 | Status: SHIPPED | OUTPATIENT
Start: 2023-07-06

## 2023-07-06 NOTE — TELEPHONE ENCOUNTER
Pharmacist from [address] [place] [address]  Cb: Solange he has questions on the patient's medication before sending an approval.  Says its time sensitive.

## 2023-07-06 NOTE — TELEPHONE ENCOUNTER
[facility], answered additional questions for medication promethazine  Answers are being forwarded to prior [facility] dept. Awaiting decision.

## 2023-07-06 NOTE — TELEPHONE ENCOUNTER
Approved    Prior authorization approved Case ID: [id number]      Payer:  [employer]   pPromethazine HCl Oral Tablet 25 MG generic Phenergan a quantity of 200 tablets for 100 days is approved until 12/31/2023.  /p  pThis drug is approved because you met our prior authorization criteria. You met the criteria because your doctor told us the benefits of taking this drug outweigh the potential risks, the risks and side effects have been discussed with you, and you will be monitored.  /p  pThis request is approved for as long as you meet all of the following conditions: 1 you remain enrolled in the plan, 2 your prescribing physician continues to prescribe the drug, 3 and the drug continues to be considered safe for treating your disease or medical condition. /p   Approval Details    Authorized from July 1, 2023 to December 31, 2023

## 2023-07-06 NOTE — LETTER
2023     Finn Lopez, [address] [place] [id number]    Patient: Donavon Calhoun   YOB: 1960   Date of Visit: 2023       Dear Dr. Whiting Quivers:    Thank you for referring Donavon Calhoun to me for evaluation. Below are my notes for this consultation. If you have questions, please do not hesitate to call me. I look forward to following your patient along with you. Sincerely,        Constanza Gibbs MD        CC: No Recipients    Constanza Gibbs MD  2023  8:51 AM  Sign when Signing Visit  [address] [facility]  [address] [address]  [phone]  [email or web address]      NAME: Donavon Calhoun  AGE: 70 y.o. SEX: female  : 1960   MRN: [id number]    DATE: 2023  TIME: 3:43 PM    Assessment and Plan:    Chronic urethral and bladder pain, lower abdominal pain, history of interstitial cystitis with history of Hunner's ulcers, and recurrent UTI. Last urinary tract infection was severe and it was in May. She tried Estrace before and had a localized reaction to it and I told her we should try a different formulation such as Premarin  and Friday and try to get through the first 2 weeks of it. She is also wondering if her history of hCG use has affected her hormone levels. We discussed hormone replacement in women, and particularly the new interest in testosterone in women. For this reason we will check a testosterone level, and an estradiol level as well. She will use 1 fingertip dab of Premarin cream to the urethra  and Friday. We will see how she does with this. Premarin cream or Estrace applied to the urethra is the #1 prevention in postmenopausal women for recurrent urinary tract infections. I will set her up for a year appointment or as needed.     For her IC and pelvic floor dysfunction symptoms, she will continue with pelvic floor therapy and she is carefully watching her diet. She is able to distinguish between UTI symptoms and IC/bladder pain pelvic floor dysfunction symptoms. Chief Complaint   No chief complaint on file. History of Present Illness   Follow-up chronic urethral bladder pain, lower abdominal pain has a history of interstitial cystitis with a history of Hunner's ulcers. I last saw her in telemedicine March 31, 2023 and she was having right-sided bladder pain with intermittent stream.  I think this was now overlapping with symptomatic recurrent urinary tract infections. We treated her for a Proteus mirabilis infection at that time so she was treated with Levaquin 750 mg daily. I then started her on methenamine 1000 mg p.o. twice daily along with vitamin C. She is unable to tolerate estrogen cream.  She had normal CAT scan with no suggestion of any form of colovesical fistula September 2021. Plan was that if she continued to have recurrent infections we may need to revisit the estrogen cream and see if she can tolerate another formulation and consider repeating a CAT scan. I have always wish to avoid urethral manipulation because this seems to exacerbate things. She was also on hydroxyzine and Prelief. She takes Pyridium 1 to 2/day at the most.  Also have prescribed tramadol for her. Has done pelvic floor physical therapy. She is very pleased with the pelvic floor therapy. She was on hCG treatment for infertility for years when she was younger. She is wondering if this has anything to do with her bladder. She had an E. coli UTI greater than 100,000 colonies May 16, 2023 for which she was treated with Keflex.   "This was a bad 1"    The following portions of the patient's history were reviewed and updated as appropriate: allergies, current medications, past family history, past medical history, past social history, past surgical history and problem list.  Past Medical History:   Diagnosis Date   • A-fib ([address] [facility]) 03/2020 • Allergic    • Anxiety    • Arthritis    • Asthma    • Back pain     and muscle pain   • Bruises easily    • Chronic pain disorder     Interstitial pain   • Colon polyp    • Dental bridge present    • Depression    • Eczema    • GERD (gastroesophageal reflux disease)    • Heart murmur    • History of blood clots     per pt "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"   • History of pneumonia    • Hives    • Hyperlipidemia    • Hypertension    • Infertility, female    • Interstitial cystitis    • Migraine     sees neurologist   • Motion sickness    • Palpitations    • PONV (postoperative nausea and vomiting)    • Psychiatric disorder    • TIA (transient ischemic attack) 09/2022    per pt --"had MRI and 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"   • Urinary incontinence     wears Pads   • Wears glasses      Past Surgical History:   Procedure Laterality Date   • COLONOSCOPY     • DILATION AND CURETTAGE OF UTERUS     • EGD     • EXPLORATORY LAPAROTOMY      for infertility   • HAND SURGERY      Hand excision of tendon cyst   • MO LAPAROSCOPIC APPENDECTOMY N/A 05/03/2022    Procedure: APPENDECTOMY LAPAROSCOPIC;  Surgeon:  Alo Fairbanks MD;  Location: [facility];  Service: General   • MO TEAEC W/PATCH GRF CAROTID VERTB [address] Left 1/31/2023    Procedure: EXPLORATION OF LEFT CAROTID ARTERY; ABORTED ENDARTERECTOMY ARTERY CAROTID;  Surgeon: Lizzie Carlton DO;  Location: [facility];  Service: Vascular   • [address] ARTERY STENT     • WISDOM TOOTH EXTRACTION       shoulder  Review of Systems   Review of Systems    Active Problem List     Patient Active Problem List   Diagnosis   • Acute upper respiratory infection   • Allergic rhinitis   • Angina pectoris ([address])   • Anxiety and depression   • Arthritis   • Asthma due to seasonal allergies   • Attention disturbance   • Benign essential hypertension   • Interstitial cystitis   • Chronic low back pain   • Familial hyperlipidemia   • Elevated antinuclear antibody (SON) level   • Elevated blood sugar   • Essential hypertension   • Female pelvic pain   • Hyperlipidemia   • Lipid disorder   • Migraines   • Obesity (BMI 30-39. 9)   • Tick bite   • Tobacco abuse   • Venous insufficiency   • Anxiety   • Insomnia   • Snoring   • Cervicalgia   • Headache   • AMD (age-related macular degeneration), bilateral   • Allergic reaction   • Palpitations and chest pain.    • Intertrigo   • Acute gastric ulcer without hemorrhage or perforation   • Tinea corporis   • Gastroesophageal reflux disease without esophagitis   • Allergic conjunctivitis of both eyes   • Intrinsic atopic dermatitis   • Angio-edema   • Vasomotor rhinitis   • Other chest pain   • Low TSH level   • Paroxysmal atrial fibrillation (HCC)   • Vaginal candidiasis   • WELLINGTON (dyspnea on exertion)   • Hair loss   • Patellar tendinitis of left knee   • Injury of toe on right foot   • Wheezing   • Lumbar radiculopathy   • Chronic fatigue   • Acute pyelonephritis   • CAMILLE (obstructive sleep apnea)   • Hypertensive heart disease with congestive heart failure ([facility])   • Shortness of breath   • Unspecified diastolic (congestive) heart failure ([facility])   • Vitamin B12 deficiency   • Left upper quadrant pain   • Diverticulitis   • 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[facility])   • Hypoxia   • Chronic bronchitis, unspecified chronic bronchitis type ([facility])   • Left arm pain   • Hematoma   • Continuous opioid dependence ([facility])   • COPD (chronic obstructive pulmonary disease) ([facility])   • Moderate persistent asthma without complication   • Urinary retention   • Peripheral vascular disease ([address])   • Appendix disease   • S/P appendectomy   • Pain of upper abdomen   • Left upper quadrant abdominal pain   • Recurrent UTI   • Stroke-like symptoms   • Localized swelling of left lower extremity   • Carotid artery stenosis   • Stenosis of left carotid artery   • Asymptomatic stenosis of left carotid artery   • Premature atrial contractions   • Lower abdominal pain   • Hepatic steatosis   • Partial facial nerve palsy   • Morbid obesity (HCC)   • Left lower quadrant abdominal pain   • Numbness   • Facial droop   • Left facial numbness   • Speech disorder   • Injury of left facial nerve   • Paresthesia       Objective   LMP  (LMP Unknown)     Physical Exam        Current Medications     Current Outpatient Medications:   •  acetaZOLAMIDE (DIAMOX) 125 mg tablet, Take 1 tab PO in a.m. and in p.m. for 1 week, then 1 tab in a.m. and 2 tabs in p.m. for 1 week, then 2 tabs in a.m. and 2 tabs in p.m. for 1 week, then 2 tabs in a.m. and 3 tabs in p.m. for 1 week, then 3 tabs in a.m. and 3 tabs in p.m. for 1 week, then 3 tabs in a.m. and 4 tabs in p.m. for 1 week, then 4 tabs in a.m. and 4 tabs in p.m. and continue., Disp: 240 tablet, Rfl: 6  •  Ascorbic Acid (vitamin C) 1000 MG tablet, Take 1,000 mg by mouth daily, Disp: , Rfl:   •  aspirin 81 mg chewable tablet, Chew 1 tablet (81 mg total) daily, Disp: , Rfl: 0  •  Biotin w/ Vitamins C & E (HAIR/SKIN/NAILS PO), Take by mouth, Disp: , Rfl:   •  butalbital-acetaminophen-caffeine (FIORICET,ESGIC) -40 mg per tablet, TAKE ONE TABLET BY MOUTH EVERY 6 HOURS AS NEEDED FOR HEADACHES.  (Patient not taking: Reported on 6/28/2023), Disp: 90 tablet, Rfl: 1  •  chlordiazepoxide-clidinium (LIBRAX) 5-2.5 mg per capsule, Take 1 capsule by mouth 4 (four) times a day (before meals and at bedtime) (Patient taking differently: Take 1 capsule by mouth as needed), Disp: 120 capsule, Rfl: 5  •  Cholecalciferol 25 MCG (1000 UT) tablet, Take 1,000 Units by mouth daily (Patient not taking: Reported on 6/29/2023), Disp: , Rfl:   •  clotrimazole-betamethasone (LOTRISONE) 1-0.05 % cream, , Disp: , Rfl:   •  dexamethasone (DECADRON) 4 mg tablet, 1 tab PO with breakfast for 1 to 5 days for unrelenting migraine, Disp: 5 tablet, Rfl: 0  •  diltiazem (CARDIZEM CD) 300 mg 24 hr capsule, Take 1 capsule (300 mg total) by mouth daily, Disp: 90 capsule, Rfl: 3  •  Docusate Sodium (COLACE PO), Take by mouth daily at bedtime, Disp: , Rfl:   •  Eliquis 5 MG, TAKE ONE TABLET BY MOUTH TWICE DAILY, Disp: 180 tablet, Rfl: 0  •  famotidine (PEPCID) 20 mg tablet, Take 20 mg by mouth daily at bedtime, Disp: , Rfl:   •  fexofenadine (ALLEGRA) 180 MG tablet, Take 180 mg by mouth daily, Disp: , Rfl:   •  fluticasone-vilanterol (BREO ELLIPTA) 200-25 MCG/INH inhaler, Inhale 1 puff daily Rinse mouth after use., Disp: 3 Inhaler, Rfl: 3  •  furosemide (LASIX) 40 mg tablet, Take 1 tablet (40 mg total) by mouth daily, Disp: 90 tablet, Rfl: 0  •  hydrOXYzine HCL (ATARAX) 25 mg tablet, TAKE TWO TABLETS BY MOUTH DAILY AT BEDTIME, Disp: 90 tablet, Rfl: 0  •  LORazepam (ATIVAN) 1 mg tablet, Take 1 tablet (1 mg total) by mouth every 6 (six) hours as needed for anxiety, Disp: 90 tablet, Rfl: 0  •  Menaquinone-7 (VITAMIN K2 PO), Take 7 Units by mouth if needed (Patient not taking: Reported on 6/28/2023), Disp: , Rfl:   •  metoprolol succinate (TOPROL-XL) 100 mg 24 hr tablet, Take 1 tablet (100 mg total) by mouth 2 (two) times a day, Disp: 180 tablet, Rfl: 2  •  montelukast (SINGULAIR) 10 mg tablet, TAKE ONE TABLET BY MOUTH DAILY AT BEDTIME, Disp: 90 tablet, Rfl: 0  •  MULTIPLE VITAMIN PO, Take by mouth, Disp: , Rfl:   •  naloxone (NARCAN) 4 mg/0.1 mL nasal spray, Administer 1 spray into a nostril. If no response after 2-3 minutes, give another dose in the other nostril using a new spray.  (Patient not taking: Reported on 3/28/2023), Disp: 1 each, Rfl: 1  •  omeprazole (PriLOSEC) 40 MG capsule, Take 1 capsule (40 mg total) by mouth daily (Patient taking differently: Take 40 mg by mouth 2 (two) times a day), Disp: 309 capsule, Rfl: 4  •  ondansetron (Zofran ODT) 4 mg disintegrating tablet, Take 1 tablet (4 mg total) by mouth every 6 (six) hours as needed for nausea or vomiting, Disp: 60 tablet, Rfl: 0  •  phenazopyridine (PYRIDIUM) 200 mg tablet, Take 1 tablet (200 mg total) by mouth 3 (three) times a day as needed for bladder spasms Not to be used for more than 2 consecutive days. , Disp: 30 tablet, Rfl: 0  •  Probiotic Product (PROBIOTIC-10 PO), Take 1 tablet by mouth in the morning, Disp: , Rfl:   •  promethazine (PHENERGAN) 25 mg tablet, Take 1 tablet (25 mg total) by mouth every 6 (six) hours as needed for nausea or vomiting, Disp: 60 tablet, Rfl: 0  •  Repatha SureClick 937 MG/ML SOAJ, Inject 140mg under the skin every 2 weeks. , Disp: 2 mL, Rfl: 11  •  spironolactone (ALDACTONE) 25 mg tablet, TAKE ONE TABLET BY MOUTH TWICE DAILY, Disp: 180 tablet, Rfl: 0  •  traMADol (Ultram) 50 mg tablet, Take 2 tablets (100 mg total) by mouth every 8 (eight) hours as needed for moderate pain, Disp: 60 tablet, Rfl: 3  •  Turmeric 500 MG CAPS, , Disp: , Rfl:   •  Ubrogepant (UBRELVY) 100 MG tablet, Take 1 tablet (100 mg) one time as needed for migraine. May repeat one additional tablet (100 mg) at least two hours after the first dose.  Do not use more than two doses per day (Patient not taking: Reported on 6/29/2023), Disp: 16 tablet, Rfl: 11  •  Zinc 100 MG TABS, Take by mouth daily, Disp: , Rfl:     Current Facility-Administered Medications:   •  cyanocobalamin injection 1,000 mcg, 1,000 mcg, Intramuscular, Q30 Days, Olesya Cornelius DO, 1,000 mcg at 11/28/22 1017    Time-15 minutes    Ariela Mcgee MD

## 2023-07-07 ENCOUNTER — OFFICE VISIT (OUTPATIENT)
Dept: FAMILY MEDICINE CLINIC | Facility: CLINIC | Age: 63
End: 2023-07-07
Payer: COMMERCIAL

## 2023-07-07 VITALS
OXYGEN SATURATION: 98 % | SYSTOLIC BLOOD PRESSURE: 100 MMHG | TEMPERATURE: 97.9 F | HEIGHT: 64 IN | DIASTOLIC BLOOD PRESSURE: 64 MMHG | HEART RATE: 55 BPM | WEIGHT: 190.2 LBS | BODY MASS INDEX: 32.47 KG/M2

## 2023-07-07 DIAGNOSIS — R42 DIZZINESS: ICD-10-CM

## 2023-07-07 DIAGNOSIS — I10 BENIGN ESSENTIAL HYPERTENSION: Primary | ICD-10-CM

## 2023-07-07 PROCEDURE — 99214 OFFICE O/P EST MOD 30 MIN: CPT | Performed by: FAMILY MEDICINE

## 2023-07-07 PROCEDURE — 96372 THER/PROPH/DIAG INJ SC/IM: CPT | Performed by: FAMILY MEDICINE

## 2023-07-07 RX ORDER — DILTIAZEM HYDROCHLORIDE 120 MG/1
120 CAPSULE, EXTENDED RELEASE ORAL DAILY
Qty: 90 CAPSULE | Refills: 3
Start: 2023-07-07 | End: 2024-07-01

## 2023-07-07 RX ADMIN — CYANOCOBALAMIN 1000 MCG: 1000 INJECTION, SOLUTION INTRAMUSCULAR; SUBCUTANEOUS at 10:52

## 2023-07-07 NOTE — PROGRESS NOTES
Assessment/Plan: Dizziness improving with decreasing diltiazem to 120 mg. Patient will remain on this dose. TSH CMP CBC reviewed. Patient will slowly reinitiate Diamox. Patient will decrease Lasix to 20 mg daily. Patient with laboratory studies done. Follow-up in 2 weeks       Diagnoses and all orders for this visit:    Benign essential hypertension  -     diltiazem (CARDIZEM SR) 120 mg 12 hr capsule; Take 1 capsule (120 mg total) by mouth in the morning    Dizziness  -     Heavy metals, blood; Future            Subjective:        Patient ID: Madhavi Benton is a 70 y.o. female. Patient is here with fatigue and dizziness. Patient did see neurology. Patient started on Diamox. Patient diagnosed with intracranial hypertension. Patient did see cardiology on Friday. Patient had diltiazem increased to 300 mg. Patient noticed side effects after this. Patient has stopped Diamox and decrease diltiazem 120 mg daily. The following portions of the patient's history were reviewed and updated as appropriate: allergies, current medications, past family history, past medical history, past social history, past surgical history and problem list.      Review of Systems   Constitutional: Positive for fatigue. HENT: Negative. Eyes: Negative. Respiratory: Negative. Cardiovascular: Negative. Gastrointestinal: Negative. Endocrine: Negative. Genitourinary: Negative. Musculoskeletal: Negative. Skin: Negative. Allergic/Immunologic: Negative. Neurological: Positive for dizziness. Hematological: Negative. Psychiatric/Behavioral: Negative. Objective:               /64 (BP Location: Right arm, Patient Position: Sitting, Cuff Size: Standard)   Pulse 55   Temp 97.9 °F (36.6 °C) (Tympanic)   Ht 5' 4" (1.626 m)   Wt 86.3 kg (190 lb 3.2 oz)   LMP  (LMP Unknown)   SpO2 98%   BMI 32.65 kg/m²          Physical Exam  Vitals and nursing note reviewed.    Constitutional: General: She is not in acute distress. Appearance: Normal appearance. She is not ill-appearing, toxic-appearing or diaphoretic. Cardiovascular:      Rate and Rhythm: Normal rate and regular rhythm. Pulses: Normal pulses. Heart sounds: Normal heart sounds. Pulmonary:      Effort: Pulmonary effort is normal.      Breath sounds: Normal breath sounds. Neurological:      Mental Status: She is alert.

## 2023-07-14 ENCOUNTER — TELEPHONE (OUTPATIENT)
Dept: NEUROLOGY | Facility: CLINIC | Age: 63
End: 2023-07-14

## 2023-07-14 ENCOUNTER — TELEPHONE (OUTPATIENT)
Dept: UROLOGY | Facility: MEDICAL CENTER | Age: 63
End: 2023-07-14

## 2023-07-14 ENCOUNTER — OFFICE VISIT (OUTPATIENT)
Dept: PHYSICAL THERAPY | Facility: REHABILITATION | Age: 63
End: 2023-07-14
Payer: COMMERCIAL

## 2023-07-14 DIAGNOSIS — R39.89 BLADDER PAIN: ICD-10-CM

## 2023-07-14 DIAGNOSIS — K59.09 OTHER CONSTIPATION: ICD-10-CM

## 2023-07-14 DIAGNOSIS — R10.9 CHRONIC ABDOMINAL PAIN: ICD-10-CM

## 2023-07-14 DIAGNOSIS — G89.29 CHRONIC ABDOMINAL PAIN: ICD-10-CM

## 2023-07-14 DIAGNOSIS — N39.0 RECURRENT UTI: Primary | ICD-10-CM

## 2023-07-14 DIAGNOSIS — M62.89 PELVIC FLOOR DYSFUNCTION: Primary | ICD-10-CM

## 2023-07-14 PROCEDURE — 97110 THERAPEUTIC EXERCISES: CPT | Performed by: PHYSICAL THERAPIST

## 2023-07-14 PROCEDURE — 97140 MANUAL THERAPY 1/> REGIONS: CPT | Performed by: PHYSICAL THERAPIST

## 2023-07-14 NOTE — TELEPHONE ENCOUNTER
Patient left voicemail on nursing line regarding the same. Patient is taking diamox and diltiazem which was recently increased by cardiology. Increase in medication caused extreme letharfy. PCP decreased diamox to 125mg daily and diltiazem to 120mg daily. Patient noting low blood pressures - concerned that medication is not a good fit. Too tired, exhausted and just "existing". Patient asking if there is an alternate option to the diamox.     [phone]

## 2023-07-14 NOTE — TELEPHONE ENCOUNTER
Call placed to patient and spoke with her. Informed her of the medication that was sent to her pharmacy. She is aware and will take as directed.

## 2023-07-14 NOTE — TELEPHONE ENCOUNTER
Patient managed by Dr. Park Trevino at the [facility]. She has a history of chronic urethral and bladder pain and history of IC. Patient called with complaints of burning with urination, increased urgency and hesitancy. Pt denies fever or chills at this time    Orders placed for UA C&S to rule out urinary tract infection. Office will follow up as results become available usually within 48-72 hours. Patient encouraged to hydrate well with water and avoid bladder irritants. Patient instructed to call back with worsening symptoms, fever, chills, blood in the urine or difficulty urinating.

## 2023-07-14 NOTE — TELEPHONE ENCOUNTER
Patient of Dr. Clemente Calles at [facility]    Patient called stating she is having urgency,frequency and burning and only a little bit of urine is coming out.    She is having cramping on her lower abdominal.      She can be reached at [phone]

## 2023-07-14 NOTE — PROGRESS NOTES
PT Evaluation     Today's date: 2023  Patient name: Sussy Virgen  : 1960  MRN: [id number]  Referring provider: Ngoc Franks, *  Dx:   Encounter Diagnosis     ICD-10-CM    1. Pelvic floor dysfunction  M62.89       2. Other constipation  K59.09       3. Chronic abdominal pain  R10.9     G89.29       4. Bladder pain  R39.89           Start Time: 0730  Stop Time: 08  Total time in clinic (min): 58 minutes    Assessment  Assessment details: Patient is a 70 y.o. female who presents to physical therapy with diagnosis of constipation and abdominal pain. Pt presents upon re-evaluation in current flare of bladder/urethral pain and will reach out to urology for possible urinalysis. Pt presents with overall improvement in PT with awareness and understanding of symptoms management strategies. Pt also present with improved mobility, increased proximal hip strength, and improved load transfer. Pt is appropriate for d/c to independent HEP to manage symptoms. PT verbalized understanding of continued management of pain with use of gentle motion. exercises/stretches. Pt can return to PT in the future if needed for further evaluation. Pt is in agreement with POC. Impairments: abnormal or restricted ROM, activity intolerance, impaired balance, impaired physical strength, lacks appropriate home exercise program and pain with function  Understanding of Dx/Px/POC: good   Prognosis: good    Goals  Short term goals: (2-3 weeks)  Pt will be able to delay urge at least 10 minutes - MET  Pt will demonstrate good understanding of urge delay techniques in 6 weeks - met  Pt will utilize proper bowel mechanics and posture - met  Pt will present wt MMT 4/5 b/l LE all planes - partially met     Long term goals: (10 weeks)  Pt will be compliant with HEP by discharge.  - met  Pt will be able to delay urge for greater than 20 minutes by discharge - met  Pt will present with bladder pain rated 3/10 at worst - partially met, currently in a flare, pain is 7/10 at worst         Plan  Plan details: Pt /dc'ed from skilled PT. Patient would benefit from: skilled physical therapy  Planned modality interventions: cryotherapy, biofeedback and thermotherapy: hydrocollator packs  Planned therapy interventions: joint mobilization, manual therapy, neuromuscular re-education, patient education, strengthening, stretching, therapeutic activities, therapeutic exercise, activity modification, abdominal trunk stabilization, coordination, flexibility, functional ROM exercises and home exercise program  Treatment plan discussed with: patient        PT Pelvic Floor Subjective:   History of Present Illness:       Prior Pregnancy comment:  Infertility for 14 years Pt reports she is having an IC flare and is dealing with nausea and pain today. She reports"I feel there is a blockage"   When she voids right now, only little dribbles come out. This has been present a few days. She also reports burning pain. She will call urology today. Pt reports overall progress in PT despite flare. She understands mechanics of what to do to manage symptoms. Pt continues to have bladder pain, 0-7/10 at worst during current flare, 4/10 when not in a flare    She is not having urinary leakage. She has urinary urgency but able to control it. Pt reports 5 days ago, she was not having any symptoms of urinary hesitancy or constipation. She feels more symptoms right now because of pelvic/bladder pain. Pain 5/10. She will reach out to urology.  Mechanism of injury: dislocation    Social Support:     L[place] in:  [place]    Lives with:  Alone    Work status: unemployed    Life stress severity: moderate    History of Depression: yes (20 years ago)  Diet and Exercise:    Diet:balanced nutrition    Exercise type: no activity  OB/ gyn History    Gestational History:     Prior Pregnancy: No      Menstrual History:      Menopausal: menopause  Bladder Function: Voiding Difficulties positive for: urgency and hesitancy      Voiding Difficulties negative for: frequent urination, straining and incomplete emptying      Voiding Difficulties comments:     Voiding frequency: every 3-4 hours and every 1-2 hours    Urinary leakage not aggravated by: bending, standing up, walking to the bathroom and post-void dribble    Nocturia (episodes per night): 0    Painful urination: Yes (burning)      Intake (ounces): Intake (ounces) comment: 72 oz water   Incontinence Management:     Pads/Diaper Use:  None    Pads/Diapers Additional Comments: does not normally wear them, wore one today just in case   Bowel Function:     Bowel frequency: daily and every 2 days    [place] Stool Scale: type 2, type 3, type 4 and type 1    Stool softener use: stool softeners (miralax)    Enema use: no enema    Uses "squatty potty": no Squatty Potty  Sexual Function:     Sexually Active:  Not sexually active  Treatments:     Current treatment: physical therapy    Patient Goals: Other patient goals:  Be able to pee without hesitation, control urge, no leakage - MOSTLY MET      Objective     Pt had 5/10 pain. Utilized bike x8 mins and pain decreased to 0/10.      Pt provided verbal consent prior to and throughout objective assessment     Tenderness:  Piriformis: neg b/l  Post pelvic floor: pos b/l  Obturator Internus: pos b/l  Adductors: pos b/l  mid and distal 1/3rd tenderness   Linea alba: distal 1/3rd tenderness     Lumbar AROM   WNL     SLS  Fair load transfer b/l, requires UE support to maintain SLS after 2 secs    Other Comments:  Good PFM contraction  Fair TAC, compensates with breath holding       MMT:   Left Right   Hip flex 4 4-   Hip ABD 4 4   Hip ext 4 4-   Hip IR 4+ 4   Hip ER 4+ 4+   Knee flex 4+ 4+   Knee ext 4+ 4+   Ankle DF 4+ 4+               Special Tests:   Left Right   EMANI - -   FADIR - -   Post Thigh Thrust - -   Pop angle 48 deg 49 deg                     Precautions: a-fib, anxiety, HTN. asthma  Impairments/functional limitations: urinary urgency, frequency, leakage, constipation, pelvic pain.  Interstitial cystitits  Daily Treatment Diary    Manuals 6/8 6/12 6/30 7/14      External assess          Internal assess                    Re-eval    35 MINS       Neuro Re-Ed          TAC  review        PF  review        TAC+PF  Review, with breath Review, with breath       TAC+PF with SLR   1x5 ea, with counting out loud HEP      TAC+PF with s/l ABD   np       TAC+PF with bridge  3"x10 3"x6 HEP      TAC+PF with clamshells   3"x5 R, 3"x7 L HEP                SLS   30"x2 ea with UE support HEP      Hip hike to neutral   review       Pain neuroscience education    review                360 deg breathing          Ther Ex    review      Bike   5 mins L1, RPE 4 8 mins L1 RPE 4  8 mins L1 RPE 4      Supine piri stretch    Review, HEP      LTR    Review, HEP      Seated piri stretch    Review, HEP      Supine butterfly stretch    Review HEP      Seated lucho pose    Review HEP                Standing hip ABD   2x5 ea HEP      Standing march    x5 ea HEP                                              Ther Activity          Urge delay:QF review         Urge delay: HR review         Freeze, breathe, squeeze  review Review         The knack nv Review         Bowel mechanics/posture Review  Review        Managing IAP/exhale on exertion Review  Review  review       Healthy bladder habits  Bladder irritants, sipping vs cugging Review  review       Urinary hesitancy   nv                  Gait Training                              Modalities

## 2023-07-15 NOTE — TELEPHONE ENCOUNTER
Unfortunately not, but I would be happy to write cardiology or PCP to explain why we need to increase diamox rather than diltiazem at this point. I think her IIH is contributing to her heart issues potentially so treating it is essential. The symptoms she is having are not actually side effects of the medication, rather the brain slowly adjusting to returning to normal pressure and we can go as slow as we need to with the titration so that she can tolerate it, but this could help her come off many meds in the long run is my hope. Id be happy to discuss again at sooner visit if she wants.

## 2023-07-17 NOTE — TELEPHONE ENCOUNTER
Called and spoke with her myself in detail this evening. Takes med and then eats - 125 mg in am   At some point gets nauseated and she thinks it is related since new, probably sometime in afternoon  Started July 2nd and July 3rd took one in am and one at night and then day 3 went back to just one in am since she was sleeping all the time and then decreased it and diltiazem. Will take it at night instead. Not giving up on it yet, as she has seen too much positive. Has been writing in a diary and took a fioricet and it made her sick. We discussed I wonder if since she takes her 125 mg in the a.m. if she feels nauseous in the p.m. as it wears off and has rebound high pressure. She plans to first try and take it at night and then if that does not work I would suggest taking it maybe even 3 times a day as it may be rebound high pressure that causes her fatigue. Not typical to have such side effects at such a minimal dose.   Certainly no matter what if blood pressure drops could come down lower on diltiazem

## 2023-07-17 NOTE — TELEPHONE ENCOUNTER
Pt left VM returning our call. Transcribed VM:   Hi, this is Doreen Jordan. YOB: 1960. And I had a call this morning early this morning. And so I guess I'm supposed to call back. All right, if you could give me a call back and let me know. I did try to get ahold of Dr. Raad Marcos. Okay, thank you. Bye. Called back and spoke with pt. Cardizem and Diamox were decreased by PCP r/t pt feeling exhausted. She reports that Diamox makes me her nauseous, but takes Phenergan, but does not want to be taking one pill to suppress the side effects of the other. She also reports that at times she becomes severely diaphoretic and SOB from little to no activity and is wondering if this is also a side effect of Diamox. Pt also wanted Dr. Raad Marcos to know that she stop taking the Fioricet b/c it is making her very sick for some reason. Right now just taking Excedrin for Has. Please advise. Thank you.

## 2023-07-18 ENCOUNTER — APPOINTMENT (EMERGENCY)
Dept: CT IMAGING | Facility: HOSPITAL | Age: 63
DRG: 330 | End: 2023-07-18
Payer: COMMERCIAL

## 2023-07-18 ENCOUNTER — NURSE TRIAGE (OUTPATIENT)
Age: 63
End: 2023-07-18

## 2023-07-18 ENCOUNTER — TELEPHONE (OUTPATIENT)
Dept: NEUROLOGY | Facility: CLINIC | Age: 63
End: 2023-07-18

## 2023-07-18 ENCOUNTER — HOSPITAL ENCOUNTER (INPATIENT)
Facility: HOSPITAL | Age: 63
LOS: 9 days | Discharge: HOME HEALTH | DRG: 330 | End: 2023-07-28
Attending: EMERGENCY MEDICINE | Admitting: INTERNAL MEDICINE
Payer: COMMERCIAL

## 2023-07-18 DIAGNOSIS — G89.18 ACUTE POSTOPERATIVE PAIN: ICD-10-CM

## 2023-07-18 DIAGNOSIS — E87.20 LACTIC ACIDOSIS: ICD-10-CM

## 2023-07-18 DIAGNOSIS — I48.0 PAROXYSMAL ATRIAL FIBRILLATION (HCC): ICD-10-CM

## 2023-07-18 DIAGNOSIS — I50.30 DIASTOLIC CONGESTIVE HEART FAILURE, UNSPECIFIED HF CHRONICITY (HCC): ICD-10-CM

## 2023-07-18 DIAGNOSIS — F41.9 ANXIETY: ICD-10-CM

## 2023-07-18 DIAGNOSIS — J43.8 OTHER EMPHYSEMA (HCC): ICD-10-CM

## 2023-07-18 DIAGNOSIS — I11.0 HYPERTENSIVE HEART DISEASE WITH CONGESTIVE HEART FAILURE, UNSPECIFIED HEART FAILURE TYPE (HCC): ICD-10-CM

## 2023-07-18 DIAGNOSIS — K92.2 GI BLEED: ICD-10-CM

## 2023-07-18 DIAGNOSIS — G43.709 CHRONIC MIGRAINE W/O AURA W/O STATUS MIGRAINOSUS, NOT INTRACTABLE: ICD-10-CM

## 2023-07-18 DIAGNOSIS — R33.9 URINARY RETENTION: ICD-10-CM

## 2023-07-18 DIAGNOSIS — G47.33 OSA (OBSTRUCTIVE SLEEP APNEA): ICD-10-CM

## 2023-07-18 DIAGNOSIS — K21.9 GASTROESOPHAGEAL REFLUX DISEASE WITHOUT ESOPHAGITIS: Primary | ICD-10-CM

## 2023-07-18 DIAGNOSIS — I10 ESSENTIAL HYPERTENSION: ICD-10-CM

## 2023-07-18 DIAGNOSIS — K92.1 HEMATOCHEZIA: ICD-10-CM

## 2023-07-18 DIAGNOSIS — K55.9 COLONIC ISCHEMIA (HCC): ICD-10-CM

## 2023-07-18 DIAGNOSIS — K52.9 COLITIS: Primary | ICD-10-CM

## 2023-07-18 LAB
ALBUMIN SERPL BCP-MCNC: 4.7 G/DL (ref 3.5–5)
ALP SERPL-CCNC: 118 U/L (ref 34–104)
ALT SERPL W P-5'-P-CCNC: 14 U/L (ref 7–52)
ANION GAP SERPL CALCULATED.3IONS-SCNC: 14 MMOL/L
APTT PPP: 33 SECONDS (ref 23–37)
AST SERPL W P-5'-P-CCNC: 17 U/L (ref 13–39)
BACTERIA UR QL AUTO: ABNORMAL /HPF
BASOPHILS # BLD AUTO: 0.09 THOUSANDS/ÂΜL (ref 0–0.1)
BASOPHILS NFR BLD AUTO: 0 % (ref 0–1)
BILIRUB SERPL-MCNC: 0.47 MG/DL (ref 0.2–1)
BILIRUB UR QL STRIP: NEGATIVE
BUN SERPL-MCNC: 18 MG/DL (ref 5–25)
CALCIUM SERPL-MCNC: 9.5 MG/DL (ref 8.4–10.2)
CAOX CRY URNS QL MICRO: ABNORMAL /HPF
CHLORIDE SERPL-SCNC: 105 MMOL/L (ref 96–108)
CLARITY UR: CLEAR
CO2 SERPL-SCNC: 19 MMOL/L (ref 21–32)
COLOR UR: ABNORMAL
CREAT SERPL-MCNC: 0.99 MG/DL (ref 0.6–1.3)
EOSINOPHIL # BLD AUTO: 0.02 THOUSAND/ÂΜL (ref 0–0.61)
EOSINOPHIL NFR BLD AUTO: 0 % (ref 0–6)
ERYTHROCYTE [DISTWIDTH] IN BLOOD BY AUTOMATED COUNT: 12.7 % (ref 11.6–15.1)
GFR SERPL CREATININE-BSD FRML MDRD: 59 ML/MIN/1.73SQ M
GLUCOSE SERPL-MCNC: 149 MG/DL (ref 65–140)
GLUCOSE UR STRIP-MCNC: NEGATIVE MG/DL
HCT VFR BLD AUTO: 47.8 % (ref 34.8–46.1)
HGB BLD-MCNC: 16 G/DL (ref 11.5–15.4)
HGB UR QL STRIP.AUTO: NEGATIVE
HYALINE CASTS #/AREA URNS LPF: ABNORMAL /LPF
IMM GRANULOCYTES # BLD AUTO: 0.19 THOUSAND/UL (ref 0–0.2)
IMM GRANULOCYTES NFR BLD AUTO: 1 % (ref 0–2)
INR PPP: 1.11 (ref 0.84–1.19)
KETONES UR STRIP-MCNC: NEGATIVE MG/DL
LEUKOCYTE ESTERASE UR QL STRIP: NEGATIVE
LYMPHOCYTES # BLD AUTO: 1.18 THOUSANDS/ÂΜL (ref 0.6–4.47)
LYMPHOCYTES NFR BLD AUTO: 4 % (ref 14–44)
MCH RBC QN AUTO: 30.1 PG (ref 26.8–34.3)
MCHC RBC AUTO-ENTMCNC: 33.5 G/DL (ref 31.4–37.4)
MCV RBC AUTO: 90 FL (ref 82–98)
MONOCYTES # BLD AUTO: 1.6 THOUSAND/ÂΜL (ref 0.17–1.22)
MONOCYTES NFR BLD AUTO: 6 % (ref 4–12)
MUCOUS THREADS UR QL AUTO: ABNORMAL
NEUTROPHILS # BLD AUTO: 24.56 THOUSANDS/ÂΜL (ref 1.85–7.62)
NEUTS SEG NFR BLD AUTO: 89 % (ref 43–75)
NITRITE UR QL STRIP: NEGATIVE
NON-SQ EPI CELLS URNS QL MICRO: ABNORMAL /HPF
NRBC BLD AUTO-RTO: 0 /100 WBCS
PH UR STRIP.AUTO: 5.5 [PH]
PLATELET # BLD AUTO: 249 THOUSANDS/UL (ref 149–390)
PMV BLD AUTO: 9.5 FL (ref 8.9–12.7)
POTASSIUM SERPL-SCNC: 3.3 MMOL/L (ref 3.5–5.3)
PROT SERPL-MCNC: 7.7 G/DL (ref 6.4–8.4)
PROT UR STRIP-MCNC: ABNORMAL MG/DL
PROTHROMBIN TIME: 14.4 SECONDS (ref 11.6–14.5)
RBC # BLD AUTO: 5.32 MILLION/UL (ref 3.81–5.12)
RBC #/AREA URNS AUTO: ABNORMAL /HPF
SODIUM SERPL-SCNC: 138 MMOL/L (ref 135–147)
SP GR UR STRIP.AUTO: 1.02 (ref 1–1.03)
UROBILINOGEN UR STRIP-ACNC: <2 MG/DL
WBC # BLD AUTO: 27.64 THOUSAND/UL (ref 4.31–10.16)
WBC #/AREA URNS AUTO: ABNORMAL /HPF

## 2023-07-18 PROCEDURE — 86900 BLOOD TYPING SEROLOGIC ABO: CPT

## 2023-07-18 PROCEDURE — 80053 COMPREHEN METABOLIC PANEL: CPT

## 2023-07-18 PROCEDURE — 85730 THROMBOPLASTIN TIME PARTIAL: CPT

## 2023-07-18 PROCEDURE — 86850 RBC ANTIBODY SCREEN: CPT

## 2023-07-18 PROCEDURE — 99285 EMERGENCY DEPT VISIT HI MDM: CPT

## 2023-07-18 PROCEDURE — 86901 BLOOD TYPING SEROLOGIC RH(D): CPT

## 2023-07-18 PROCEDURE — G1004 CDSM NDSC: HCPCS

## 2023-07-18 PROCEDURE — 81001 URINALYSIS AUTO W/SCOPE: CPT

## 2023-07-18 PROCEDURE — 96374 THER/PROPH/DIAG INJ IV PUSH: CPT

## 2023-07-18 PROCEDURE — 99285 EMERGENCY DEPT VISIT HI MDM: CPT | Performed by: INTERNAL MEDICINE

## 2023-07-18 PROCEDURE — 74177 CT ABD & PELVIS W/CONTRAST: CPT

## 2023-07-18 PROCEDURE — 83605 ASSAY OF LACTIC ACID: CPT

## 2023-07-18 PROCEDURE — 36415 COLL VENOUS BLD VENIPUNCTURE: CPT

## 2023-07-18 PROCEDURE — 96361 HYDRATE IV INFUSION ADD-ON: CPT

## 2023-07-18 PROCEDURE — 85025 COMPLETE CBC W/AUTO DIFF WBC: CPT

## 2023-07-18 PROCEDURE — 85610 PROTHROMBIN TIME: CPT

## 2023-07-18 RX ORDER — ONDANSETRON 2 MG/ML
4 INJECTION INTRAMUSCULAR; INTRAVENOUS ONCE
Status: COMPLETED | OUTPATIENT
Start: 2023-07-18 | End: 2023-07-18

## 2023-07-18 RX ORDER — OMEPRAZOLE 40 MG/1
40 CAPSULE, DELAYED RELEASE ORAL
Qty: 60 CAPSULE | Refills: 5 | Status: SHIPPED | OUTPATIENT
Start: 2023-07-18 | End: 2023-07-28

## 2023-07-18 RX ORDER — LORAZEPAM 1 MG/1
1 TABLET ORAL EVERY 6 HOURS PRN
Qty: 90 TABLET | Refills: 0 | Status: SHIPPED | OUTPATIENT
Start: 2023-07-18 | End: 2023-07-28

## 2023-07-18 RX ORDER — SUCRALFATE 1 G/1
1 TABLET ORAL
Qty: 60 TABLET | Refills: 5 | Status: SHIPPED | OUTPATIENT
Start: 2023-07-18

## 2023-07-18 RX ADMIN — SODIUM CHLORIDE 1000 ML: 0.9 INJECTION, SOLUTION INTRAVENOUS at 22:45

## 2023-07-18 RX ADMIN — ONDANSETRON 4 MG: 2 INJECTION INTRAMUSCULAR; INTRAVENOUS at 22:46

## 2023-07-18 RX ADMIN — IOHEXOL 100 ML: 350 INJECTION, SOLUTION INTRAVENOUS at 23:33

## 2023-07-18 NOTE — LETTER
July 25, 2023      Mercy Litten  [address]  [facility] [address]      Our office has been unsuccessful in trying to reach you by phone regarding:      ? Other:____List of eye doctors_______________      Please contact  Brittny Soto if you have any questions or need any additional assistance at [phone].         Sincerely,      [facility]

## 2023-07-18 NOTE — TELEPHONE ENCOUNTER
Please advise   Ov: 5/10/23 Dr. Omega Sellers  Hx: Nausea, unintentional weight loss, early satiety, GERD  Barium swallow done on 6/19/23     Patient calling in, reports Nausea and acid reflux coming into her esophagus worsening since after starting Diamox from the neurologist. She did explain her neurologist explained nausea is a side effect and she has been working on it with them. She wanted to know if there was anything else she could try. Patient is on omeprazole 40 mg BID and she needs a new script sent to her pharmacy. Phenergan 25 mg every 6 hours- works a little   Patient explained Carafate worked in the past but does not have any more left.

## 2023-07-18 NOTE — TELEPHONE ENCOUNTER
MSW phoned pt and lvm requesting a call back. This writer sent list of providers via mail to patient.        [address]  [address], [address] [facility] ([place])•0.7 miles  [phone]     DR Fleeta Simmonds  [address] [place] [id number] ([place])•1.2 miles  [phone] [address] [facility] [address]  [address], [address] [facility] ([place])•0.3 miles  [phone]     DR Durán[address] [facility]  [address]  ([place])•1.2 miles  [phone]     [address]  [address]  ([place])•1.2 miles  [phone] [address] [facility], [address] ([place])•4.1 miles  [phone]     [address]  [address] ([place])•4.2 miles  [phone]

## 2023-07-18 NOTE — TELEPHONE ENCOUNTER
----- Message from Jessica Rehman MD sent at 7/17/2023  7:36 PM EDT -----  Could you please help her with a list of eye doctors who take her insurance and it does not have to be specifically a neuro-ophthalmologist.

## 2023-07-19 ENCOUNTER — APPOINTMENT (INPATIENT)
Dept: GASTROENTEROLOGY | Facility: HOSPITAL | Age: 63
DRG: 330 | End: 2023-07-19
Payer: COMMERCIAL

## 2023-07-19 ENCOUNTER — ANESTHESIA (INPATIENT)
Dept: GASTROENTEROLOGY | Facility: HOSPITAL | Age: 63
DRG: 330 | End: 2023-07-19
Payer: COMMERCIAL

## 2023-07-19 ENCOUNTER — ANESTHESIA EVENT (INPATIENT)
Dept: PERIOP | Facility: HOSPITAL | Age: 63
DRG: 330 | End: 2023-07-19
Payer: COMMERCIAL

## 2023-07-19 ENCOUNTER — APPOINTMENT (INPATIENT)
Dept: CT IMAGING | Facility: HOSPITAL | Age: 63
DRG: 330 | End: 2023-07-19
Payer: COMMERCIAL

## 2023-07-19 ENCOUNTER — ANESTHESIA EVENT (INPATIENT)
Dept: GASTROENTEROLOGY | Facility: HOSPITAL | Age: 63
DRG: 330 | End: 2023-07-19
Payer: COMMERCIAL

## 2023-07-19 ENCOUNTER — ANESTHESIA (INPATIENT)
Dept: PERIOP | Facility: HOSPITAL | Age: 63
DRG: 330 | End: 2023-07-19
Payer: COMMERCIAL

## 2023-07-19 PROBLEM — K55.9 COLONIC ISCHEMIA (HCC): Status: ACTIVE | Noted: 2023-07-19

## 2023-07-19 PROBLEM — G43.709 CHRONIC MIGRAINE W/O AURA W/O STATUS MIGRAINOSUS, NOT INTRACTABLE: Status: ACTIVE | Noted: 2023-07-19

## 2023-07-19 PROBLEM — L65.9 HAIR LOSS: Status: RESOLVED | Noted: 2020-07-09 | Resolved: 2023-07-19

## 2023-07-19 PROBLEM — L20.84 INTRINSIC ATOPIC DERMATITIS: Status: RESOLVED | Noted: 2020-01-22 | Resolved: 2023-07-19

## 2023-07-19 PROBLEM — R00.2 PALPITATION: Status: RESOLVED | Noted: 2019-04-02 | Resolved: 2023-07-19

## 2023-07-19 PROBLEM — E66.01 MORBID OBESITY (HCC): Status: RESOLVED | Noted: 2023-02-10 | Resolved: 2023-07-19

## 2023-07-19 PROBLEM — G47.00 INSOMNIA: Status: RESOLVED | Noted: 2018-11-16 | Resolved: 2023-07-19

## 2023-07-19 PROBLEM — Z86.73 HISTORY OF CVA (CEREBROVASCULAR ACCIDENT): Status: ACTIVE | Noted: 2023-07-19

## 2023-07-19 PROBLEM — M76.52 PATELLAR TENDINITIS OF LEFT KNEE: Status: RESOLVED | Noted: 2020-07-31 | Resolved: 2023-07-19
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
## 2023-07-19 PROBLEM — R29.90 STROKE-LIKE SYMPTOMS: Status: RESOLVED | Noted: 2022-09-01 | Resolved: 2023-07-19

## 2023-07-19 PROBLEM — N10 ACUTE PYELONEPHRITIS: Status: RESOLVED | Noted: 2020-11-04 | Resolved: 2023-07-19

## 2023-07-19 PROBLEM — R10.12 LEFT UPPER QUADRANT ABDOMINAL PAIN: Status: RESOLVED | Noted: 2022-08-03 | Resolved: 2023-07-19

## 2023-07-19 PROBLEM — R29.810 FACIAL DROOP: Status: RESOLVED | Noted: 2023-02-20 | Resolved: 2023-07-19

## 2023-07-19 PROBLEM — Z90.49 S/P APPENDECTOMY: Status: RESOLVED | Noted: 2022-07-08 | Resolved: 2023-07-19

## 2023-07-19 PROBLEM — K25.3 ACUTE GASTRIC ULCER WITHOUT HEMORRHAGE OR PERFORATION: Status: RESOLVED | Noted: 2019-06-12 | Resolved: 2023-07-19

## 2023-07-19 PROBLEM — R10.2 FEMALE PELVIC PAIN: Status: RESOLVED | Noted: 2017-09-25 | Resolved: 2023-07-19

## 2023-07-19 PROBLEM — K57.92 DIVERTICULITIS: Status: RESOLVED | Noted: 2021-08-05 | Resolved: 2023-07-19

## 2023-07-19 PROBLEM — K52.9 COLITIS: Status: ACTIVE | Noted: 2023-07-19

## 2023-07-19 PROBLEM — R06.09 DOE (DYSPNEA ON EXERTION): Status: RESOLVED | Noted: 2020-06-11 | Resolved: 2023-07-19

## 2023-07-19 PROBLEM — G93.2 IIH (IDIOPATHIC INTRACRANIAL HYPERTENSION): Status: ACTIVE | Noted: 2023-07-19

## 2023-07-19 PROBLEM — M54.2 CERVICALGIA: Status: RESOLVED | Noted: 2018-11-16 | Resolved: 2023-07-19

## 2023-07-19 PROBLEM — J42 CHRONIC BRONCHITIS, UNSPECIFIED CHRONIC BRONCHITIS TYPE (HCC): Status: RESOLVED | Noted: 2021-08-30 | Resolved: 2023-07-19

## 2023-07-19 PROBLEM — R20.0 LEFT FACIAL NUMBNESS: Status: RESOLVED | Noted: 2023-02-28 | Resolved: 2023-07-19

## 2023-07-19 PROBLEM — L30.4 INTERTRIGO: Status: RESOLVED | Noted: 2019-04-02 | Resolved: 2023-07-19

## 2023-07-19 PROBLEM — R79.89 LOW TSH LEVEL: Status: RESOLVED | Noted: 2020-02-06 | Resolved: 2023-07-19

## 2023-07-19 PROBLEM — J45.40 MODERATE PERSISTENT ASTHMA WITHOUT COMPLICATION: Status: RESOLVED | Noted: 2021-03-30 | Resolved: 2023-07-19

## 2023-07-19 PROBLEM — R10.12 LEFT UPPER QUADRANT PAIN: Status: RESOLVED | Noted: 2021-07-27 | Resolved: 2023-07-19

## 2023-07-19 PROBLEM — R06.02 SHORTNESS OF BREATH: Status: RESOLVED | Noted: 2021-02-11 | Resolved: 2023-07-19

## 2023-07-19 PROBLEM — R53.82 CHRONIC FATIGUE: Status: RESOLVED | Noted: 2020-10-01 | Resolved: 2023-07-19

## 2023-07-19 PROBLEM — F11.20 CONTINUOUS OPIOID DEPENDENCE (HCC): Status: RESOLVED | Noted: 2021-11-15 | Resolved: 2023-07-19

## 2023-07-19 PROBLEM — R51.9 HEADACHE: Status: RESOLVED | Noted: 2018-11-16 | Resolved: 2023-07-19

## 2023-07-19 PROBLEM — J06.9 ACUTE UPPER RESPIRATORY INFECTION: Status: RESOLVED | Noted: 2017-02-24 | Resolved: 2023-07-19

## 2023-07-19 PROBLEM — F41.9 ANXIETY: Status: RESOLVED | Noted: 2018-10-19 | Resolved: 2023-07-19

## 2023-07-19 PROBLEM — I65.22 LEFT CAROTID STENOSIS: Status: ACTIVE | Noted: 2023-07-19

## 2023-07-19 PROBLEM — R47.9 SPEECH DISORDER: Status: RESOLVED | Noted: 2023-03-07 | Resolved: 2023-07-19

## 2023-07-19 PROBLEM — M19.90 ARTHRITIS: Status: RESOLVED | Noted: 2017-08-01 | Resolved: 2023-07-19

## 2023-07-19 PROBLEM — E87.20 LACTIC ACIDOSIS: Status: ACTIVE | Noted: 2023-07-19

## 2023-07-19 PROBLEM — I87.2 VENOUS INSUFFICIENCY: Status: RESOLVED | Noted: 2017-08-01 | Resolved: 2023-07-19

## 2023-07-19 PROBLEM — R10.32 LEFT LOWER QUADRANT ABDOMINAL PAIN: Status: RESOLVED | Noted: 2023-02-10 | Resolved: 2023-07-19

## 2023-07-19 PROBLEM — I49.1 PREMATURE ATRIAL CONTRACTIONS: Status: RESOLVED | Noted: 2022-11-09 | Resolved: 2023-07-19

## 2023-07-19 PROBLEM — T14.8XXA HEMATOMA: Status: RESOLVED | Noted: 2021-11-15 | Resolved: 2023-07-19

## 2023-07-19 PROBLEM — R20.0 NUMBNESS: Status: RESOLVED | Noted: 2023-02-16 | Resolved: 2023-07-19

## 2023-07-19 PROBLEM — E53.8 VITAMIN B12 DEFICIENCY: Status: RESOLVED | Noted: 2021-03-18 | Resolved: 2023-07-19

## 2023-07-19 PROBLEM — R10.30 LOWER ABDOMINAL PAIN: Status: RESOLVED | Noted: 2022-12-19 | Resolved: 2023-07-19

## 2023-07-19 PROBLEM — R20.2 PARESTHESIA: Status: RESOLVED | Noted: 2023-04-17 | Resolved: 2023-07-19

## 2023-07-19 PROBLEM — K92.1 HEMATOCHEZIA: Status: ACTIVE | Noted: 2023-07-19

## 2023-07-19 PROBLEM — R10.10 PAIN OF UPPER ABDOMEN: Status: RESOLVED | Noted: 2022-08-03 | Resolved: 2023-07-19

## 2023-07-19 PROBLEM — B37.31 VAGINAL CANDIDIASIS: Status: RESOLVED | Noted: 2020-04-09 | Resolved: 2023-07-19

## 2023-07-19 PROBLEM — J30.0 VASOMOTOR RHINITIS: Status: RESOLVED | Noted: 2020-01-25 | Resolved: 2023-07-19

## 2023-07-19 PROBLEM — R06.83 SNORING: Status: RESOLVED | Noted: 2018-11-16 | Resolved: 2023-07-19

## 2023-07-19 PROBLEM — N39.0 RECURRENT UTI: Status: RESOLVED | Noted: 2022-08-24 | Resolved: 2023-07-19

## 2023-07-19 PROBLEM — R09.02 HYPOXIA: Status: RESOLVED | Noted: 2021-08-30 | Resolved: 2023-07-19

## 2023-07-19 PROBLEM — B35.4 TINEA CORPORIS: Status: RESOLVED | Noted: 2019-07-15 | Resolved: 2023-07-19

## 2023-07-19 PROBLEM — R07.89 OTHER CHEST PAIN: Status: RESOLVED | Noted: 2020-01-27 | Resolved: 2023-07-19

## 2023-07-19 PROBLEM — R06.2 WHEEZING: Status: RESOLVED | Noted: 2020-09-21 | Resolved: 2023-07-19

## 2023-07-19 PROBLEM — R41.840 ATTENTION DISTURBANCE: Status: RESOLVED | Noted: 2017-01-26 | Resolved: 2023-07-19

## 2023-07-19 PROBLEM — S99.921A INJURY OF TOE ON RIGHT FOOT: Status: RESOLVED | Noted: 2020-09-01 | Resolved: 2023-07-19

## 2023-07-19 PROBLEM — M79.602 LEFT ARM PAIN: Status: RESOLVED | Noted: 2021-08-30 | Resolved: 2023-07-19

## 2023-07-19 PROBLEM — R22.42 LOCALIZED SWELLING OF LEFT LOWER EXTREMITY: Status: RESOLVED | Noted: 2022-09-01 | Resolved: 2023-07-19

## 2023-07-19 LAB
ABO GROUP BLD: NORMAL
ANION GAP SERPL CALCULATED.3IONS-SCNC: 13 MMOL/L
ARTERIAL PATENCY WRIST A: NO
BASE EXCESS BLDA CALC-SCNC: -8.6 MMOL/L
BLD GP AB SCN SERPL QL: NEGATIVE
BUN SERPL-MCNC: 18 MG/DL (ref 5–25)
CALCIUM SERPL-MCNC: 9.3 MG/DL (ref 8.4–10.2)
CHLORIDE SERPL-SCNC: 108 MMOL/L (ref 96–108)
CO2 SERPL-SCNC: 18 MMOL/L (ref 21–32)
CREAT SERPL-MCNC: 0.97 MG/DL (ref 0.6–1.3)
ERYTHROCYTE [DISTWIDTH] IN BLOOD BY AUTOMATED COUNT: 12.9 % (ref 11.6–15.1)
GFR SERPL CREATININE-BSD FRML MDRD: 61 ML/MIN/1.73SQ M
GLUCOSE SERPL-MCNC: 185 MG/DL (ref 65–140)
HCO3 BLDA-SCNC: 16.2 MMOL/L (ref 22–28)
HCT VFR BLD AUTO: 45.5 % (ref 34.8–46.1)
HGB BLD-MCNC: 15.4 G/DL (ref 11.5–15.4)
LACTATE SERPL-SCNC: 1.4 MMOL/L (ref 0.5–2)
LACTATE SERPL-SCNC: 2.3 MMOL/L (ref 0.5–2)
LACTATE SERPL-SCNC: 2.6 MMOL/L (ref 0.5–2)
LACTATE SERPL-SCNC: 3 MMOL/L (ref 0.5–2)
LACTATE SERPL-SCNC: 4 MMOL/L (ref 0.5–2)
LACTATE SERPL-SCNC: 4.7 MMOL/L (ref 0.5–2)
MCH RBC QN AUTO: 30.1 PG (ref 26.8–34.3)
MCHC RBC AUTO-ENTMCNC: 33.8 G/DL (ref 31.4–37.4)
MCV RBC AUTO: 89 FL (ref 82–98)
NON VENT ROOM AIR: 98 %
O2 CT BLDA-SCNC: 18.3 ML/DL (ref 16–23)
OXYHGB MFR BLDA: 93.8 % (ref 94–97)
PCO2 BLDA: 31.6 MM HG (ref 36–44)
PH BLDA: 7.33 [PH] (ref 7.35–7.45)
PLATELET # BLD AUTO: 255 THOUSANDS/UL (ref 149–390)
PMV BLD AUTO: 9.1 FL (ref 8.9–12.7)
PO2 BLDA: 84.2 MM HG (ref 75–129)
POTASSIUM SERPL-SCNC: 3.6 MMOL/L (ref 3.5–5.3)
PROCALCITONIN SERPL-MCNC: 0.42 NG/ML
RBC # BLD AUTO: 5.11 MILLION/UL (ref 3.81–5.12)
RH BLD: NEGATIVE
SODIUM SERPL-SCNC: 139 MMOL/L (ref 135–147)
SPECIMEN EXPIRATION DATE: NORMAL
SPECIMEN SOURCE: ABNORMAL
WBC # BLD AUTO: 27.9 THOUSAND/UL (ref 4.31–10.16)

## 2023-07-19 PROCEDURE — 85027 COMPLETE CBC AUTOMATED: CPT | Performed by: NURSE PRACTITIONER

## 2023-07-19 PROCEDURE — 45330 DIAGNOSTIC SIGMOIDOSCOPY: CPT | Performed by: INTERNAL MEDICINE

## 2023-07-19 PROCEDURE — 74174 CTA ABD&PLVS W/CONTRAST: CPT

## 2023-07-19 PROCEDURE — 99222 1ST HOSP IP/OBS MODERATE 55: CPT | Performed by: INTERNAL MEDICINE

## 2023-07-19 PROCEDURE — 80048 BASIC METABOLIC PNL TOTAL CA: CPT | Performed by: NURSE PRACTITIONER

## 2023-07-19 PROCEDURE — 0DJD8ZZ INSPECTION OF LOWER INTESTINAL TRACT, VIA NATURAL OR ARTIFICIAL OPENING ENDOSCOPIC: ICD-10-PCS | Performed by: INTERNAL MEDICINE

## 2023-07-19 PROCEDURE — 96375 TX/PRO/DX INJ NEW DRUG ADDON: CPT

## 2023-07-19 PROCEDURE — 99222 1ST HOSP IP/OBS MODERATE 55: CPT | Performed by: NURSE PRACTITIONER

## 2023-07-19 PROCEDURE — 84145 PROCALCITONIN (PCT): CPT

## 2023-07-19 PROCEDURE — 4A133J1 MONITORING OF ARTERIAL PULSE, PERIPHERAL, PERCUTANEOUS APPROACH: ICD-10-PCS | Performed by: SURGERY

## 2023-07-19 PROCEDURE — 0DJD8ZZ INSPECTION OF LOWER INTESTINAL TRACT, VIA NATURAL OR ARTIFICIAL OPENING ENDOSCOPIC: ICD-10-PCS | Performed by: SURGERY

## 2023-07-19 PROCEDURE — 0DTL0ZZ RESECTION OF TRANSVERSE COLON, OPEN APPROACH: ICD-10-PCS | Performed by: SURGERY

## 2023-07-19 PROCEDURE — 83605 ASSAY OF LACTIC ACID: CPT

## 2023-07-19 PROCEDURE — 44140 PARTIAL REMOVAL OF COLON: CPT | Performed by: SURGERY

## 2023-07-19 PROCEDURE — 44139 MOBILIZATION OF COLON: CPT | Performed by: SURGERY

## 2023-07-19 PROCEDURE — 99223 1ST HOSP IP/OBS HIGH 75: CPT | Performed by: SURGERY

## 2023-07-19 PROCEDURE — 4A133B1 MONITORING OF ARTERIAL PRESSURE, PERIPHERAL, PERCUTANEOUS APPROACH: ICD-10-PCS | Performed by: SURGERY

## 2023-07-19 PROCEDURE — C9290 INJ, BUPIVACAINE LIPOSOME: HCPCS | Performed by: SURGERY

## 2023-07-19 PROCEDURE — 99232 SBSQ HOSP IP/OBS MODERATE 35: CPT | Performed by: INTERNAL MEDICINE

## 2023-07-19 PROCEDURE — 84100 ASSAY OF PHOSPHORUS: CPT | Performed by: NURSE PRACTITIONER

## 2023-07-19 PROCEDURE — 36415 COLL VENOUS BLD VENIPUNCTURE: CPT

## 2023-07-19 PROCEDURE — 85027 COMPLETE CBC AUTOMATED: CPT

## 2023-07-19 PROCEDURE — 80048 BASIC METABOLIC PNL TOTAL CA: CPT

## 2023-07-19 PROCEDURE — 83735 ASSAY OF MAGNESIUM: CPT | Performed by: NURSE PRACTITIONER

## 2023-07-19 PROCEDURE — 0DTM0ZZ RESECTION OF DESCENDING COLON, OPEN APPROACH: ICD-10-PCS | Performed by: SURGERY

## 2023-07-19 PROCEDURE — 83605 ASSAY OF LACTIC ACID: CPT | Performed by: SURGERY

## 2023-07-19 PROCEDURE — 82805 BLOOD GASES W/O2 SATURATION: CPT | Performed by: NURSE PRACTITIONER

## 2023-07-19 PROCEDURE — 88307 TISSUE EXAM BY PATHOLOGIST: CPT | Performed by: PATHOLOGY

## 2023-07-19 PROCEDURE — 83605 ASSAY OF LACTIC ACID: CPT | Performed by: NURSE PRACTITIONER

## 2023-07-19 PROCEDURE — 03HY32Z INSERTION OF MONITORING DEVICE INTO UPPER ARTERY, PERCUTANEOUS APPROACH: ICD-10-PCS | Performed by: SURGERY

## 2023-07-19 PROCEDURE — 96376 TX/PRO/DX INJ SAME DRUG ADON: CPT

## 2023-07-19 RX ORDER — SODIUM CHLORIDE, SODIUM GLUCONATE, SODIUM ACETATE, POTASSIUM CHLORIDE, MAGNESIUM CHLORIDE, SODIUM PHOSPHATE, DIBASIC, AND POTASSIUM PHOSPHATE .53; .5; .37; .037; .03; .012; .00082 G/100ML; G/100ML; G/100ML; G/100ML; G/100ML; G/100ML; G/100ML
INJECTION, SOLUTION INTRAVENOUS CONTINUOUS PRN
Status: DISCONTINUED | OUTPATIENT
Start: 2023-07-19 | End: 2023-07-19

## 2023-07-19 RX ORDER — ONDANSETRON 2 MG/ML
4 INJECTION INTRAMUSCULAR; INTRAVENOUS EVERY 6 HOURS PRN
Status: DISCONTINUED | OUTPATIENT
Start: 2023-07-19 | End: 2023-07-28 | Stop reason: HOSPADM

## 2023-07-19 RX ORDER — CEFTRIAXONE 1 G/50ML
1000 INJECTION, SOLUTION INTRAVENOUS EVERY 24 HOURS
Status: DISCONTINUED | OUTPATIENT
Start: 2023-07-19 | End: 2023-07-25

## 2023-07-19 RX ORDER — MORPHINE SULFATE 4 MG/ML
4 INJECTION, SOLUTION INTRAMUSCULAR; INTRAVENOUS ONCE
Status: COMPLETED | OUTPATIENT
Start: 2023-07-19 | End: 2023-07-19

## 2023-07-19 RX ORDER — TRAMADOL HYDROCHLORIDE 50 MG/1
50 TABLET ORAL EVERY 8 HOURS PRN
Status: DISCONTINUED | OUTPATIENT
Start: 2023-07-19 | End: 2023-07-19

## 2023-07-19 RX ORDER — FAMOTIDINE 20 MG/1
20 TABLET, FILM COATED ORAL
Status: DISCONTINUED | OUTPATIENT
Start: 2023-07-19 | End: 2023-07-19

## 2023-07-19 RX ORDER — ONDANSETRON 2 MG/ML
4 INJECTION INTRAMUSCULAR; INTRAVENOUS ONCE
Status: COMPLETED | OUTPATIENT
Start: 2023-07-19 | End: 2023-07-19

## 2023-07-19 RX ORDER — MAGNESIUM HYDROXIDE 1200 MG/15ML
LIQUID ORAL AS NEEDED
Status: DISCONTINUED | OUTPATIENT
Start: 2023-07-19 | End: 2023-07-19 | Stop reason: HOSPADM

## 2023-07-19 RX ORDER — METRONIDAZOLE 500 MG/100ML
500 INJECTION, SOLUTION INTRAVENOUS EVERY 8 HOURS
Status: DISCONTINUED | OUTPATIENT
Start: 2023-07-19 | End: 2023-07-20

## 2023-07-19 RX ORDER — BUPIVACAINE HYDROCHLORIDE 5 MG/ML
INJECTION, SOLUTION EPIDURAL; INTRACAUDAL AS NEEDED
Status: DISCONTINUED | OUTPATIENT
Start: 2023-07-19 | End: 2023-07-19 | Stop reason: HOSPADM

## 2023-07-19 RX ORDER — ACETAZOLAMIDE 250 MG/1
125 TABLET ORAL
Status: DISCONTINUED | OUTPATIENT
Start: 2023-07-19 | End: 2023-07-19

## 2023-07-19 RX ORDER — ONDANSETRON 2 MG/ML
4 INJECTION INTRAMUSCULAR; INTRAVENOUS ONCE AS NEEDED
Status: DISCONTINUED | OUTPATIENT
Start: 2023-07-19 | End: 2023-07-19 | Stop reason: HOSPADM

## 2023-07-19 RX ORDER — ACETAMINOPHEN 325 MG/1
650 TABLET ORAL EVERY 6 HOURS PRN
Status: DISCONTINUED | OUTPATIENT
Start: 2023-07-19 | End: 2023-07-19

## 2023-07-19 RX ORDER — SODIUM CHLORIDE 9 MG/ML
INJECTION, SOLUTION INTRAVENOUS CONTINUOUS PRN
Status: DISCONTINUED | OUTPATIENT
Start: 2023-07-19 | End: 2023-07-19

## 2023-07-19 RX ORDER — HYDROMORPHONE HCL/PF 1 MG/ML
SYRINGE (ML) INJECTION AS NEEDED
Status: DISCONTINUED | OUTPATIENT
Start: 2023-07-19 | End: 2023-07-19

## 2023-07-19 RX ORDER — ALBUTEROL SULFATE 2.5 MG/3ML
2.5 SOLUTION RESPIRATORY (INHALATION) ONCE AS NEEDED
Status: DISCONTINUED | OUTPATIENT
Start: 2023-07-19 | End: 2023-07-19 | Stop reason: HOSPADM

## 2023-07-19 RX ORDER — MAGNESIUM HYDROXIDE/ALUMINUM HYDROXICE/SIMETHICONE 120; 1200; 1200 MG/30ML; MG/30ML; MG/30ML
30 SUSPENSION ORAL EVERY 6 HOURS PRN
Status: DISCONTINUED | OUTPATIENT
Start: 2023-07-19 | End: 2023-07-28 | Stop reason: HOSPADM

## 2023-07-19 RX ORDER — CEFAZOLIN SODIUM 2 G/50ML
2000 SOLUTION INTRAVENOUS
Status: COMPLETED | OUTPATIENT
Start: 2023-07-20 | End: 2023-07-19

## 2023-07-19 RX ORDER — SODIUM CHLORIDE, SODIUM LACTATE, POTASSIUM CHLORIDE, CALCIUM CHLORIDE 600; 310; 30; 20 MG/100ML; MG/100ML; MG/100ML; MG/100ML
INJECTION, SOLUTION INTRAVENOUS CONTINUOUS PRN
Status: DISCONTINUED | OUTPATIENT
Start: 2023-07-19 | End: 2023-07-19

## 2023-07-19 RX ORDER — ONDANSETRON 2 MG/ML
INJECTION INTRAMUSCULAR; INTRAVENOUS AS NEEDED
Status: DISCONTINUED | OUTPATIENT
Start: 2023-07-19 | End: 2023-07-19

## 2023-07-19 RX ORDER — DEXAMETHASONE SODIUM PHOSPHATE 10 MG/ML
INJECTION, SOLUTION INTRAMUSCULAR; INTRAVENOUS AS NEEDED
Status: DISCONTINUED | OUTPATIENT
Start: 2023-07-19 | End: 2023-07-19

## 2023-07-19 RX ORDER — DILTIAZEM HYDROCHLORIDE 120 MG/1
120 CAPSULE, EXTENDED RELEASE ORAL DAILY
Status: DISCONTINUED | OUTPATIENT
Start: 2023-07-19 | End: 2023-07-20

## 2023-07-19 RX ORDER — SODIUM CHLORIDE 9 MG/ML
150 INJECTION, SOLUTION INTRAVENOUS CONTINUOUS
Status: DISCONTINUED | OUTPATIENT
Start: 2023-07-19 | End: 2023-07-19

## 2023-07-19 RX ORDER — SODIUM CHLORIDE, SODIUM GLUCONATE, SODIUM ACETATE, POTASSIUM CHLORIDE, MAGNESIUM CHLORIDE, SODIUM PHOSPHATE, DIBASIC, AND POTASSIUM PHOSPHATE .53; .5; .37; .037; .03; .012; .00082 G/100ML; G/100ML; G/100ML; G/100ML; G/100ML; G/100ML; G/100ML
INJECTION, SOLUTION INTRAVENOUS AS NEEDED
Status: DISCONTINUED | OUTPATIENT
Start: 2023-07-19 | End: 2023-07-19

## 2023-07-19 RX ORDER — SODIUM CHLORIDE, SODIUM GLUCONATE, SODIUM ACETATE, POTASSIUM CHLORIDE, MAGNESIUM CHLORIDE, SODIUM PHOSPHATE, DIBASIC, AND POTASSIUM PHOSPHATE .53; .5; .37; .037; .03; .012; .00082 G/100ML; G/100ML; G/100ML; G/100ML; G/100ML; G/100ML; G/100ML
125 INJECTION, SOLUTION INTRAVENOUS CONTINUOUS
Status: DISCONTINUED | OUTPATIENT
Start: 2023-07-19 | End: 2023-07-22

## 2023-07-19 RX ORDER — METOPROLOL TARTRATE 5 MG/5ML
INJECTION INTRAVENOUS AS NEEDED
Status: DISCONTINUED | OUTPATIENT
Start: 2023-07-19 | End: 2023-07-19

## 2023-07-19 RX ORDER — VECURONIUM BROMIDE 1 MG/ML
INJECTION, POWDER, LYOPHILIZED, FOR SOLUTION INTRAVENOUS AS NEEDED
Status: DISCONTINUED | OUTPATIENT
Start: 2023-07-19 | End: 2023-07-19

## 2023-07-19 RX ORDER — FENTANYL CITRATE 50 UG/ML
INJECTION, SOLUTION INTRAMUSCULAR; INTRAVENOUS AS NEEDED
Status: DISCONTINUED | OUTPATIENT
Start: 2023-07-19 | End: 2023-07-19

## 2023-07-19 RX ORDER — FAMOTIDINE 10 MG/ML
20 INJECTION, SOLUTION INTRAVENOUS
Status: DISCONTINUED | OUTPATIENT
Start: 2023-07-19 | End: 2023-07-23

## 2023-07-19 RX ORDER — TRAMADOL HYDROCHLORIDE 50 MG/1
100 TABLET ORAL EVERY 8 HOURS PRN
Status: DISCONTINUED | OUTPATIENT
Start: 2023-07-19 | End: 2023-07-19

## 2023-07-19 RX ORDER — LIDOCAINE HYDROCHLORIDE 10 MG/ML
INJECTION, SOLUTION EPIDURAL; INFILTRATION; INTRACAUDAL; PERINEURAL AS NEEDED
Status: DISCONTINUED | OUTPATIENT
Start: 2023-07-19 | End: 2023-07-19

## 2023-07-19 RX ORDER — MORPHINE SULFATE 4 MG/ML
4 INJECTION, SOLUTION INTRAMUSCULAR; INTRAVENOUS EVERY 4 HOURS PRN
Status: DISCONTINUED | OUTPATIENT
Start: 2023-07-19 | End: 2023-07-19

## 2023-07-19 RX ORDER — HEPARIN SODIUM 5000 [USP'U]/ML
5000 INJECTION, SOLUTION INTRAVENOUS; SUBCUTANEOUS EVERY 8 HOURS SCHEDULED
Status: DISCONTINUED | OUTPATIENT
Start: 2023-07-20 | End: 2023-07-21

## 2023-07-19 RX ORDER — CHLORHEXIDINE GLUCONATE 0.12 MG/ML
15 RINSE ORAL EVERY 12 HOURS SCHEDULED
Status: DISCONTINUED | OUTPATIENT
Start: 2023-07-19 | End: 2023-07-28 | Stop reason: HOSPADM

## 2023-07-19 RX ORDER — PROPOFOL 10 MG/ML
INJECTION, EMULSION INTRAVENOUS AS NEEDED
Status: DISCONTINUED | OUTPATIENT
Start: 2023-07-19 | End: 2023-07-19

## 2023-07-19 RX ORDER — FUROSEMIDE 40 MG/1
40 TABLET ORAL DAILY
Status: DISCONTINUED | OUTPATIENT
Start: 2023-07-19 | End: 2023-07-19

## 2023-07-19 RX ORDER — METRONIDAZOLE 500 MG/100ML
500 INJECTION, SOLUTION INTRAVENOUS
Status: COMPLETED | OUTPATIENT
Start: 2023-07-20 | End: 2023-07-19

## 2023-07-19 RX ORDER — SODIUM CHLORIDE, SODIUM GLUCONATE, SODIUM ACETATE, POTASSIUM CHLORIDE, MAGNESIUM CHLORIDE, SODIUM PHOSPHATE, DIBASIC, AND POTASSIUM PHOSPHATE .53; .5; .37; .037; .03; .012; .00082 G/100ML; G/100ML; G/100ML; G/100ML; G/100ML; G/100ML; G/100ML
1000 INJECTION, SOLUTION INTRAVENOUS ONCE
Status: DISCONTINUED | OUTPATIENT
Start: 2023-07-19 | End: 2023-07-19

## 2023-07-19 RX ORDER — MIDAZOLAM HYDROCHLORIDE 2 MG/2ML
INJECTION, SOLUTION INTRAMUSCULAR; INTRAVENOUS AS NEEDED
Status: DISCONTINUED | OUTPATIENT
Start: 2023-07-19 | End: 2023-07-19

## 2023-07-19 RX ORDER — METOPROLOL SUCCINATE 50 MG/1
100 TABLET, EXTENDED RELEASE ORAL 2 TIMES DAILY
Status: DISCONTINUED | OUTPATIENT
Start: 2023-07-19 | End: 2023-07-20

## 2023-07-19 RX ORDER — SODIUM CHLORIDE 9 MG/ML
100 INJECTION, SOLUTION INTRAVENOUS CONTINUOUS
Status: DISCONTINUED | OUTPATIENT
Start: 2023-07-19 | End: 2023-07-19

## 2023-07-19 RX ORDER — LORAZEPAM 1 MG/1
1 TABLET ORAL EVERY 6 HOURS PRN
Status: DISCONTINUED | OUTPATIENT
Start: 2023-07-19 | End: 2023-07-19

## 2023-07-19 RX ORDER — SPIRONOLACTONE 25 MG/1
25 TABLET ORAL 2 TIMES DAILY
Status: DISCONTINUED | OUTPATIENT
Start: 2023-07-19 | End: 2023-07-19

## 2023-07-19 RX ORDER — POLYETHYLENE GLYCOL 3350 17 G/17G
17 POWDER, FOR SOLUTION ORAL DAILY PRN
Status: DISCONTINUED | OUTPATIENT
Start: 2023-07-19 | End: 2023-07-23

## 2023-07-19 RX ORDER — SODIUM CHLORIDE 9 MG/ML
INJECTION, SOLUTION INTRAVENOUS AS NEEDED
Status: DISCONTINUED | OUTPATIENT
Start: 2023-07-19 | End: 2023-07-19 | Stop reason: HOSPADM

## 2023-07-19 RX ADMIN — IOHEXOL 85 ML: 350 INJECTION, SOLUTION INTRAVENOUS at 12:30

## 2023-07-19 RX ADMIN — SODIUM CHLORIDE: 0.9 INJECTION, SOLUTION INTRAVENOUS at 21:02

## 2023-07-19 RX ADMIN — METRONIDAZOLE 500 MG: 500 INJECTION, SOLUTION INTRAVENOUS at 23:50

## 2023-07-19 RX ADMIN — ONDANSETRON 4 MG: 2 INJECTION INTRAMUSCULAR; INTRAVENOUS at 00:36

## 2023-07-19 RX ADMIN — MIDAZOLAM 2 MG: 1 INJECTION INTRAMUSCULAR; INTRAVENOUS at 17:47

## 2023-07-19 RX ADMIN — SODIUM CHLORIDE, SODIUM GLUCONATE, SODIUM ACETATE, POTASSIUM CHLORIDE, MAGNESIUM CHLORIDE, SODIUM PHOSPHATE, DIBASIC, AND POTASSIUM PHOSPHATE 1000 ML: .53; .5; .37; .037; .03; .012; .00082 INJECTION, SOLUTION INTRAVENOUS at 16:34

## 2023-07-19 RX ADMIN — FENTANYL CITRATE 50 MCG: 50 INJECTION INTRAMUSCULAR; INTRAVENOUS at 17:57

## 2023-07-19 RX ADMIN — MORPHINE SULFATE 4 MG: 4 INJECTION INTRAVENOUS at 09:42

## 2023-07-19 RX ADMIN — PROPOFOL 100 MG: 10 INJECTION, EMULSION INTRAVENOUS at 16:32

## 2023-07-19 RX ADMIN — ONDANSETRON 4 MG: 2 INJECTION INTRAMUSCULAR; INTRAVENOUS at 22:25

## 2023-07-19 RX ADMIN — PROPOFOL 100 MG: 10 INJECTION, EMULSION INTRAVENOUS at 16:38

## 2023-07-19 RX ADMIN — HYDROMORPHONE HYDROCHLORIDE 0.5 MG: 1 INJECTION, SOLUTION INTRAMUSCULAR; INTRAVENOUS; SUBCUTANEOUS at 22:04

## 2023-07-19 RX ADMIN — METOROPROLOL TARTRATE 2.5 MG: 5 INJECTION, SOLUTION INTRAVENOUS at 19:37

## 2023-07-19 RX ADMIN — SODIUM CHLORIDE 125 ML/HR: 0.9 INJECTION, SOLUTION INTRAVENOUS at 09:47

## 2023-07-19 RX ADMIN — FENTANYL CITRATE 50 MCG: 50 INJECTION INTRAMUSCULAR; INTRAVENOUS at 19:42

## 2023-07-19 RX ADMIN — MORPHINE SULFATE 4 MG: 4 INJECTION INTRAVENOUS at 13:55

## 2023-07-19 RX ADMIN — PROPOFOL 110 MG: 10 INJECTION, EMULSION INTRAVENOUS at 17:57

## 2023-07-19 RX ADMIN — HYDROMORPHONE HYDROCHLORIDE 0.5 MG: 1 INJECTION, SOLUTION INTRAMUSCULAR; INTRAVENOUS; SUBCUTANEOUS at 21:02

## 2023-07-19 RX ADMIN — ONDANSETRON 4 MG: 2 INJECTION INTRAMUSCULAR; INTRAVENOUS at 07:29

## 2023-07-19 RX ADMIN — DILTIAZEM HYDROCHLORIDE 120 MG: 120 CAPSULE, EXTENDED RELEASE ORAL at 09:24

## 2023-07-19 RX ADMIN — METOPROLOL SUCCINATE 100 MG: 100 TABLET, FILM COATED, EXTENDED RELEASE ORAL at 09:24

## 2023-07-19 RX ADMIN — SODIUM CHLORIDE 100 ML/HR: 0.9 INJECTION, SOLUTION INTRAVENOUS at 07:43

## 2023-07-19 RX ADMIN — HYDROMORPHONE HYDROCHLORIDE 0.5 MG: 1 INJECTION, SOLUTION INTRAMUSCULAR; INTRAVENOUS; SUBCUTANEOUS at 18:55

## 2023-07-19 RX ADMIN — SODIUM CHLORIDE, SODIUM GLUCONATE, SODIUM ACETATE, POTASSIUM CHLORIDE, MAGNESIUM CHLORIDE, SODIUM PHOSPHATE, DIBASIC, AND POTASSIUM PHOSPHATE 125 ML/HR: .53; .5; .37; .037; .03; .012; .00082 INJECTION, SOLUTION INTRAVENOUS at 22:43

## 2023-07-19 RX ADMIN — CEFTRIAXONE 1000 MG: 1 INJECTION, SOLUTION INTRAVENOUS at 07:32

## 2023-07-19 RX ADMIN — Medication: at 22:40

## 2023-07-19 RX ADMIN — CEFAZOLIN SODIUM 2000 MG: 2 SOLUTION INTRAVENOUS at 17:40

## 2023-07-19 RX ADMIN — ONDANSETRON 4 MG: 2 INJECTION INTRAMUSCULAR; INTRAVENOUS at 13:55

## 2023-07-19 RX ADMIN — ONDANSETRON 4 MG: 2 INJECTION INTRAMUSCULAR; INTRAVENOUS at 21:19

## 2023-07-19 RX ADMIN — MORPHINE SULFATE 4 MG: 4 INJECTION INTRAVENOUS at 00:38

## 2023-07-19 RX ADMIN — LIDOCAINE HYDROCHLORIDE 60 MG: 10 INJECTION, SOLUTION EPIDURAL; INFILTRATION; INTRACAUDAL; PERINEURAL at 17:57

## 2023-07-19 RX ADMIN — SODIUM CHLORIDE, SODIUM LACTATE, POTASSIUM CHLORIDE, AND CALCIUM CHLORIDE: .6; .31; .03; .02 INJECTION, SOLUTION INTRAVENOUS at 17:40

## 2023-07-19 RX ADMIN — TRAMADOL HYDROCHLORIDE 50 MG: 50 TABLET, COATED ORAL at 11:31

## 2023-07-19 RX ADMIN — MORPHINE SULFATE 4 MG: 4 INJECTION INTRAVENOUS at 05:00

## 2023-07-19 RX ADMIN — SUGAMMADEX 200 MG: 100 INJECTION, SOLUTION INTRAVENOUS at 21:48

## 2023-07-19 RX ADMIN — VECURONIUM BROMIDE 2 MG: 1 INJECTION, POWDER, LYOPHILIZED, FOR SOLUTION INTRAVENOUS at 19:39

## 2023-07-19 RX ADMIN — Medication 2000 UNITS: at 18:43

## 2023-07-19 RX ADMIN — SODIUM CHLORIDE, SODIUM LACTATE, POTASSIUM CHLORIDE, AND CALCIUM CHLORIDE: .6; .31; .03; .02 INJECTION, SOLUTION INTRAVENOUS at 20:33

## 2023-07-19 RX ADMIN — FENTANYL CITRATE 50 MCG: 50 INJECTION INTRAMUSCULAR; INTRAVENOUS at 19:57

## 2023-07-19 RX ADMIN — METRONIDAZOLE: 500 INJECTION, SOLUTION INTRAVENOUS at 18:18

## 2023-07-19 RX ADMIN — VECURONIUM BROMIDE 2 MG: 1 INJECTION, POWDER, LYOPHILIZED, FOR SOLUTION INTRAVENOUS at 20:31

## 2023-07-19 RX ADMIN — FENTANYL CITRATE 50 MCG: 50 INJECTION INTRAMUSCULAR; INTRAVENOUS at 18:27

## 2023-07-19 RX ADMIN — CEFAZOLIN SODIUM 2000 MG: 2 SOLUTION INTRAVENOUS at 21:37

## 2023-07-19 RX ADMIN — SODIUM CHLORIDE 1000 ML: 0.9 INJECTION, SOLUTION INTRAVENOUS at 15:41

## 2023-07-19 RX ADMIN — DEXAMETHASONE SODIUM PHOSPHATE 10 MG: 10 INJECTION INTRAMUSCULAR; INTRAVENOUS at 18:32

## 2023-07-19 RX ADMIN — CHLORHEXIDINE GLUCONATE 15 ML: 1.2 RINSE ORAL at 23:49

## 2023-07-19 RX ADMIN — VECURONIUM BROMIDE 8 MG: 1 INJECTION, POWDER, LYOPHILIZED, FOR SOLUTION INTRAVENOUS at 17:57

## 2023-07-19 RX ADMIN — METRONIDAZOLE 500 MG: 500 INJECTION, SOLUTION INTRAVENOUS at 09:46

## 2023-07-19 RX ADMIN — FAMOTIDINE 20 MG: 10 INJECTION INTRAVENOUS at 23:49

## 2023-07-19 NOTE — ED PROVIDER NOTES
History  Chief Complaint   Patient presents with   • GI Bleeding     Reports constipation for several days and then GI/rectal bleeding today after BM. C/o lower ab pain, n/v. Takes Eliquis. 68-year-old female with PMH of A-fib, anxiety, asthma, chronic back pain, GERD, HTN, HLD presents for evaluation of lower GI bleeding. Patient states the she began noticing this today after a bowel movement, described as bright red with clots. States that she noticed a lot of blood in the toilet, also had an episode of watery stool fall in the ER bathroom that she states was nearly entirely blood. Patient states she was initially constipated for a few days before having a large bowel movement today where she noticed the blood. Patient also complaining of lower abdominal pain, nausea and vomiting, fatigue and generalized weakness. Patient is on Eliquis 5 mg twice daily for A-fib. On arrival, patient was noted to be hypoxic in the high 80s and was placed on nasal cannula. Patient does not wear home O2 for any respiratory conditions. Denies significant chest pain, difficulty breathing, hematemesis. Admits to prior abdominal surgeries of laparoscopic appendectomy and superior mesenteric artery stent. Prior to Admission Medications   Prescriptions Last Dose Informant Patient Reported? Taking?    Ascorbic Acid (vitamin C) 1000 MG tablet Past Week Self Yes Yes   Sig: Take 1,000 mg by mouth daily   Biotin w/ Vitamins C & E (HAIR/SKIN/NAILS PO) Past Week Self Yes Yes   Sig: Take by mouth   Cholecalciferol 25 MCG (1000 UT) tablet Past Week Self Yes Yes   Sig: Take 1,000 Units by mouth daily   Docusate Sodium (COLACE PO) Not Taking Self Yes No   Sig: Take by mouth daily at bedtime   Patient not taking: Reported on 7/19/2023   Eliquis 5 MG 7/19/2023 Self No Yes   Sig: TAKE ONE TABLET BY MOUTH TWICE DAILY   LORazepam (ATIVAN) 1 mg tablet 7/19/2023  No Yes   Sig: Take 1 tablet (1 mg total) by mouth every 6 (six) hours as needed for anxiety   MULTIPLE VITAMIN PO Past Week Self Yes Yes   Sig: Take by mouth   Probiotic Product (PROBIOTIC-10 PO) Not Taking Self Yes No   Sig: Take 1 tablet by mouth in the morning   Patient not taking: Reported on    Repatha SureClick 075 MG/ML SOAJ Unknown Self No No   Sig: Inject 140mg under the skin every 2 weeks. Zinc 100 MG TABS Not Taking Self Yes No   Sig: Take by mouth daily   Patient not taking: Reported on 2023   acetaZOLAMIDE (DIAMOX) 125 mg tablet Not Taking  No No   Sig: Take 1 tab PO in a.m. and in p.m. for 1 week, then 1 tab in a.m. and 2 tabs in p.m. for 1 week, then 2 tabs in a.m. and 2 tabs in p.m. for 1 week, then 2 tabs in a.m. and 3 tabs in p.m. for 1 week, then 3 tabs in a.m. and 3 tabs in p.m. for 1 week, then 3 tabs in a.m. and 4 tabs in p.m. for 1 week, then 4 tabs in a.m. and 4 tabs in p.m. and continue. Patient not taking: Reported on 2023   aspirin 81 mg chewable tablet 2023 Self No Yes   Sig: Chew 1 tablet (81 mg total) daily   clotrimazole-betamethasone (LOTRISONE) 1-0.05 % cream 2023 Self Yes Yes   dexamethasone (DECADRON) 4 mg tablet Past Month  No Yes   Si tab PO with breakfast for 1 to 5 days for unrelenting migraine   diltiazem (CARDIZEM SR) 120 mg 12 hr capsule 2023  No Yes   Sig: Take 1 capsule (120 mg total) by mouth in the morning   estrogens, conjugated (Premarin) vaginal cream Not Taking  No No   Sig: Insert 0.5 g into the vagina every other day Do not use applicator-1 fingertip dab to the urethra  and    Patient not taking: Reported on 2023   famotidine (PEPCID) 20 mg tablet 2023 Self Yes Yes   Sig: Take 20 mg by mouth daily at bedtime   fexofenadine (ALLEGRA) 180 MG tablet 2023 Self Yes Yes   Sig: Take 180 mg by mouth daily   fluticasone-vilanterol (BREO ELLIPTA) 200-25 MCG/INH inhaler 2023 Self No Yes   Sig: Inhale 1 puff daily Rinse mouth after use.    furosemide (LASIX) 40 mg tablet 7/19/2023  No Yes   Sig: Take 1 tablet (40 mg total) by mouth daily   hydrOXYzine HCL (ATARAX) 25 mg tablet 7/18/2023 Self No Yes   Sig: TAKE TWO TABLETS BY MOUTH DAILY AT BEDTIME   metoprolol succinate (TOPROL-XL) 100 mg 24 hr tablet 7/19/2023 Self No Yes   Sig: Take 1 tablet (100 mg total) by mouth 2 (two) times a day   montelukast (SINGULAIR) 10 mg tablet Past Week Self No Yes   Sig: TAKE ONE TABLET BY MOUTH DAILY AT BEDTIME   omeprazole (PriLOSEC) 40 MG capsule Not Taking  No No   Sig: Take 1 capsule (40 mg total) by mouth 2 (two) times a day before meals   Patient not taking: Reported on 7/19/2023   ondansetron (Zofran ODT) 4 mg disintegrating tablet Not Taking Self No No   Sig: Take 1 tablet (4 mg total) by mouth every 6 (six) hours as needed for nausea or vomiting   Patient not taking: Reported on 7/19/2023   phenazopyridine (PYRIDIUM) 200 mg tablet 7/18/2023  No Yes   Sig: Take 1 tablet (200 mg total) by mouth 3 (three) times a day as needed for bladder spasms Not to be used for more than 2 consecutive days.    promethazine (PHENERGAN) 25 mg tablet 7/19/2023  No Yes   Sig: Take 1 tablet (25 mg total) by mouth every 6 (six) hours as needed for nausea or vomiting   spironolactone (ALDACTONE) 25 mg tablet 7/19/2023 Self No Yes   Sig: TAKE ONE TABLET BY MOUTH TWICE DAILY   sucralfate (CARAFATE) 1 g tablet 7/19/2023  No Yes   Sig: Take 1 tablet (1 g total) by mouth 2 (two) times a day before meals   traMADol (Ultram) 50 mg tablet 7/19/2023  No Yes   Sig: Take 2 tablets (100 mg total) by mouth every 8 (eight) hours as needed for moderate pain      Facility-Administered Medications Last Administration Doses Remaining   cyanocobalamin injection 1,000 mcg 7/7/2023 10:52 AM           Past Medical History:   Diagnosis Date   • A-fib ([address] [facility]) 03/2020   • Allergic    • Anxiety    • Arthritis    • Asthma    • Back pain     and muscle pain   • Bruises easily    • Chronic pain disorder     Interstitial pain   • Colon polyp    • Dental bridge present    • Depression    • Eczema    • GERD (gastroesophageal reflux disease)    • Heart murmur    • History of blood clots     per pt "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"   • History of pneumonia    • Hives    • Hyperlipidemia    • Hypertension    • Infertility, female    • Interstitial cystitis    • Migraine     sees neurologist   • Motion sickness    • Palpitations    • PONV (postoperative nausea and vomiting)    • Psychiatric disorder    • TIA (transient ischemic attack) 09/2022    per pt --"had MRI and 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"   • Urinary incontinence     wears Pads   • Wears glasses        Past Surgical History:   Procedure Laterality Date   • COLONOSCOPY     • DILATION AND CURETTAGE OF UTERUS     • EGD     • EXPLORATORY LAPAROTOMY      for infertility   • HAND SURGERY      Hand excision of tendon cyst   • AR LAPAROSCOPIC APPENDECTOMY N/A 05/03/2022    Procedure: APPENDECTOMY LAPAROSCOPIC;  Surgeon:  Kenji Mack MD;  Location: [facility];  Service: General   • AR TEAEC W/PATCH GRF CAROTID VERTB [address] Left 1/31/2023    Procedure: EXPLORATION OF LEFT CAROTID ARTERY; ABORTED ENDARTERECTOMY ARTERY CAROTID;  Surgeon: Piter Bean DO;  Location: [facility];  Service: Vascular   • SUPERIOR MESTENTERIC ARTERY STENT     • WISDOM TOOTH EXTRACTION         Family History   Problem Relation Age of Onset   • Lung cancer Mother 61   • Brain cancer Mother 61   • Diabetes Father    • Depression Father    • Other Father         septic   • Allergies Sister    • Hashimoto's thyroiditis Sister    • Abdominal aortic aneurysm Sister    • Diabetes Sister    • No Known Problems Sister    • No Known Problems Maternal Grandmother    • No Known Problems Maternal Grandfather    • No Known Problems Paternal Grandmother    • No Known Problems Paternal Grandfather    • Hodgkin's lymphoma Brother    • No Known Problems Brother    • No Known Problems Maternal Aunt • Diabetes Other      I have reviewed and agree with the history as documented. E-Cigarette/Vaping   • E-Cigarette Use Never User      E-Cigarette/Vaping Substances   • Nicotine No    • THC No    • CBD No    • Flavoring No    • Other No    • Unknown No      Social History     Tobacco Use   • Smoking status: Former     Packs/day: 0.50     Years: 10.00     Total pack years: 5.00     Types: Cigarettes     Quit date:      Years since quittin.5     Passive exposure: Never   • Smokeless tobacco: Never   Vaping Use   • Vaping Use: Never used   Substance Use Topics   • Alcohol use: Never   • Drug use: No       Review of Systems   Constitutional: Positive for fatigue. Negative for chills and fever. HENT: Negative for ear pain and sore throat. Eyes: Negative for pain and visual disturbance. Respiratory: Negative for cough and shortness of breath. Cardiovascular: Negative for chest pain and palpitations. Gastrointestinal: Positive for abdominal pain, blood in stool, constipation, nausea and vomiting. Negative for diarrhea and rectal pain. Genitourinary: Negative for dysuria and hematuria. Musculoskeletal: Negative for arthralgias and back pain. Skin: Negative for color change and rash. Neurological: Positive for weakness. Negative for seizures and syncope. All other systems reviewed and are negative. Physical Exam  Physical Exam  Vitals and nursing note reviewed. Exam conducted with a chaperone present Brandon Lorenz RN). Constitutional:       General: She is in acute distress. Appearance: She is well-developed. She is obese. She is ill-appearing. She is not toxic-appearing or diaphoretic. HENT:      Head: Normocephalic and atraumatic. Eyes:      Conjunctiva/sclera: Conjunctivae normal.   Cardiovascular:      Rate and Rhythm: Normal rate and regular rhythm. Heart sounds: No murmur heard. Pulmonary:      Effort: Pulmonary effort is normal. No respiratory distress. Breath sounds: Normal breath sounds. Abdominal:      General: Abdomen is protuberant. Bowel sounds are normal. There is no distension. Palpations: Abdomen is soft. Tenderness: There is abdominal tenderness in the right lower quadrant, suprapubic area and left lower quadrant. There is no guarding. Genitourinary:     Rectum: Guaiac result positive. No mass, tenderness, anal fissure, external hemorrhoid or internal hemorrhoid. Normal anal tone. Musculoskeletal:         General: No swelling. Cervical back: Neck supple. Skin:     General: Skin is warm and dry. Capillary Refill: Capillary refill takes less than 2 seconds. Neurological:      Mental Status: She is alert.    Psychiatric:         Mood and Affect: Mood normal.         Vital Signs  ED Triage Vitals   Temperature Pulse Respirations Blood Pressure SpO2   07/18/23 2138 07/18/23 2138 07/18/23 2138 07/18/23 2138 07/18/23 2138   98.3 °F (36.8 °C) 81 20 143/65 95 %      Temp Source Heart Rate Source Patient Position - Orthostatic VS BP Location FiO2 (%)   07/18/23 2138 07/19/23 0003 07/19/23 0003 07/19/23 0003 --   Oral Monitor Lying Right arm       Pain Score       07/18/23 2138       10 - Worst Possible Pain           Vitals:    07/18/23 2200 07/19/23 0003 07/19/23 0300 07/19/23 0502   BP: 154/73 (!) 173/75 161/70 150/76   Pulse: 79 82 89 100   Patient Position - Orthostatic VS:  Lying Lying Lying         Visual Acuity      ED Medications  Medications   sodium chloride 0.9 % bolus 1,000 mL (0 mL Intravenous Stopped 7/19/23 0041)   ondansetron (ZOFRAN) injection 4 mg (4 mg Intravenous Given 7/18/23 2246)   iohexol (OMNIPAQUE) 350 MG/ML injection (SINGLE-DOSE) 100 mL (100 mL Intravenous Given 7/18/23 2333)   ondansetron (ZOFRAN) injection 4 mg (4 mg Intravenous Given 7/19/23 0036)   morphine injection 4 mg (4 mg Intravenous Given 7/19/23 0038)   morphine injection 4 mg (4 mg Intravenous Given 7/19/23 0500)       Diagnostic Studies  Results Reviewed     Procedure Component Value Units Date/Time    Lactic acid 2 Hours [[id number]]  (Abnormal) Collected: 07/19/23 0120    Lab Status: Final result Specimen: Blood from Arm, Left Updated: 07/19/23 0152     LACTIC ACID 2.6 mmol/L     Narrative:      Result may be elevated if tourniquet was used during collection. Lactic acid, plasma (w/reflex if result > 2.0) [[id number]]  (Abnormal) Collected: 07/18/23 2244    Lab Status: Final result Specimen: Blood from Arm, Left Updated: 07/19/23 0101     LACTIC ACID 2.3 mmol/L     Narrative:      Result may be elevated if tourniquet was used during collection.     Comprehensive metabolic panel [[id number]]  (Abnormal) Collected: 07/18/23 2244    Lab Status: Final result Specimen: Blood from Arm, Left Updated: 07/18/23 2321     Sodium 138 mmol/L      Potassium 3.3 mmol/L      Chloride 105 mmol/L      CO2 19 mmol/L      ANION GAP 14 mmol/L      BUN 18 mg/dL      Creatinine 0.99 mg/dL      Glucose 149 mg/dL      Calcium 9.5 mg/dL      AST 17 U/L      ALT 14 U/L      Alkaline Phosphatase 118 U/L      Total Protein 7.7 g/dL      Albumin 4.7 g/dL      Total Bilirubin 0.47 mg/dL      eGFR 59 ml/min/1.73sq m     Narrative:      [place] guidelines for Chronic Kidney Disease (CKD):   •  Stage 1 with normal or high GFR (GFR > 90 mL/min/1.73 square meters)  •  Stage 2 Mild CKD (GFR = 60-89 mL/min/1.73 square meters)  •  Stage 3A Moderate CKD (GFR = 45-59 mL/min/1.73 square meters)  •  Stage 3B Moderate CKD (GFR = 30-44 mL/min/1.73 square meters)  •  Stage 4 Severe CKD (GFR = 15-29 mL/min/1.73 square meters)  •  Stage 5 End Stage CKD (GFR <15 mL/min/1.73 square meters)  Note: GFR calculation is accurate only with a steady state creatinine    Protime-INR [[id number]]  (Normal) Collected: 07/18/23 2244    Lab Status: Final result Specimen: Blood from Arm, Left Updated: 07/18/23 2306     Protime 14.4 seconds      INR 1.11    APTT [[id number]]  (Normal) Collected: 07/18/23 2244    Lab Status: Final result Specimen: Blood from Arm, Left Updated: 07/18/23 2306     PTT 33 seconds     Urine Microscopic [[id number]]  (Abnormal) Collected: 07/18/23 2243    Lab Status: Final result Specimen: Urine, Clean Catch Updated: 07/18/23 2304     RBC, UA 1-2 /hpf      WBC, UA 1-2 /hpf      Epithelial Cells Occasional /hpf      Bacteria, UA Occasional /hpf      MUCUS THREADS Innumerable     Hyaline Casts, UA 3-5 /lpf      Ca Oxalate Marline, UA Occasional /hpf     UA w Reflex to Microscopic w Reflex to Culture [[id number]]  (Abnormal) Collected: 07/18/23 2243    Lab Status: Final result Specimen: Urine, Clean Catch Updated: 07/18/23 2254     Color, UA Dark Yellow     Clarity, UA Clear     Specific Gravity, UA 1.022     pH, UA 5.5     Leukocytes, UA Negative     Nitrite, UA Negative     Protein, UA 30 (1+) mg/dl      Glucose, UA Negative mg/dl      Ketones, UA Negative mg/dl      Urobilinogen, UA <2.0 mg/dl      Bilirubin, UA Negative     Occult Blood, UA Negative    CBC and differential [[id number]]  (Abnormal) Collected: 07/18/23 2244    Lab Status: Final result Specimen: Blood from Arm, Left Updated: 07/18/23 2251     WBC 27.64 Thousand/uL      RBC 5.32 Million/uL      Hemoglobin 16.0 g/dL      Hematocrit 47.8 %      MCV 90 fL      MCH 30.1 pg      MCHC 33.5 g/dL      RDW 12.7 %      MPV 9.5 fL      Platelets 820 Thousands/uL      nRBC 0 /100 WBCs      Neutrophils Relative 89 %      Immat GRANS % 1 %      Lymphocytes Relative 4 %      Monocytes Relative 6 %      Eosinophils Relative 0 %      Basophils Relative 0 %      Neutrophils Absolute 24.56 Thousands/µL      Immature Grans Absolute 0.19 Thousand/uL      Lymphocytes Absolute 1.18 Thousands/µL      Monocytes Absolute 1.60 Thousand/µL      Eosinophils Absolute 0.02 Thousand/µL      Basophils Absolute 0.09 Thousands/µL                  CT abdomen pelvis with contrast   Final Result by Bebo Lou MD (07/19 0148)      Extensive colitis as described above without pericolonic free air or abscess. Recommend follow-up colonoscopy when clinically appropriate. Workstation performed: [id number]                    Procedures  Procedures         ED Course  ED Course as of 07/19/23 0614   Tue Jul 18, 2023 2320 WBC(!): 27.64   2320 Red Blood Cell Count(!): 5.32   2320 Hemoglobin(!): 16.0   2320 HCT(!): 47.8   2320 Neutrophils %(!): 89   2320 Absolute Neutrophils(!): 24.56   2320 MUCUS THREADS(!): Innumerable   2320 Hyaline Casts, UA(!): 3-5   2320 Ca Oxalate Marline, UA(!): Occasional                               SBIRT 20yo+    Flowsheet Row Most Recent Value   Initial Alcohol Screen: US AUDIT-C     1. How often do you have a drink containing alcohol? 0 Filed at: 07/18/2023 2140   2. How many drinks containing alcohol do you have on a typical day you are drinking? 0 Filed at: 07/18/2023 2140   3a. Male UNDER 65: How often do you have five or more drinks on one occasion? 0 Filed at: 07/18/2023 2140   3b. FEMALE Any Age, or MALE 65+: How often do you have 4 or more drinks on one occassion? 0 Filed at: 07/18/2023 2140   Audit-C Score 0 Filed at: 07/18/2023 2140   UMESH: How many times in the past year have you. .. Used an illegal drug or used a prescription medication for non-medical reasons? Never Filed at: 07/18/2023 2140                    Medical Decision Making  38-year-old female presenting for evaluation of BRBPR, lower abdominal pain, nausea/vomiting, fatigue and generalized weakness.   Exam: Patient is fatigued and ill-appearing, wearing nasal cannula satting 94 to 98%, A-fib on the monitor, skin somewhat pale; lower abdomen tender to palpation, primarily in right lower quadrant and suprapubic regions; occult blood noted on rectal exam.  Lungs CTA B, normal respiratory effort, heart irregularly irregular, no hemorrhoids or fissures noted on rectal exam. Highly concerning due to patient's age, multiple comorbid conditions, use of blood thinner, and generally ill-appearing clinical presentation. Work-up: CBC, CMP, UA, coags, lactic acid, CT abdomen pelvis with contrast.  Management: IV fluids, morphine, and Zofran. Leukocytosis of 27.6, elevated H&H, mild hypokalemia, lactic acidosis of 2.3, increased to 2.6 at 2 hours. CT demonstrated extensive colitis without pericolonic free air or abscess. Patient will need to be admitted for continued analgesia and antiemetics, monitoring labs, and consultation with GI. Discussed case with Suzette Moran, who agrees with inpatient admission status. Patient hemodynamically stable at time of disposition, patient care transferred to [facility]. Amount and/or Complexity of Data Reviewed  Labs: ordered. Decision-making details documented in ED Course. Radiology: ordered. Risk  Prescription drug management. Decision regarding hospitalization. Disposition  Final diagnoses:   Colitis   GI bleed     Time reflects when diagnosis was documented in both MDM as applicable and the Disposition within this note     Time User Action Codes Description Comment    7/19/2023  3:06 AM Romy Lui Add [K52.9] Colitis     7/19/2023  3:06 AM Romy Lui Add [K92.2] GI bleed       ED Disposition     ED Disposition   Admit    Condition   Stable    Date/Time   Wed Jul 19, 2023  3:06 AM    Comment   Case was discussed with Rashaad Mccabe and the patient's admission status was agreed to be Admission Status: inpatient status to the service of Dr. Cameron Fletcher. Follow-up Information    None         Patient's Medications   Discharge Prescriptions    No medications on file       No discharge procedures on file.     PDMP Review       Value Time User    PDMP Reviewed  Yes 7/18/2023 12:14 PM Gloria Holland DO          ED Provider  Electronically Signed by           Edmond Devi PA-C  07/19/23 9070

## 2023-07-19 NOTE — H&P
[address] [facility]  H&P  Name: Delma Dos Santos 70 y.o. female I MRN: [id number]  Unit/Bed#: ED-05 I Date of Admission: 7/18/2023   Date of Service: 7/19/2023 I Hospital Day: 0      Assessment/Plan   IIH (idiopathic intracranial hypertension)  Assessment & Plan  Noted, currently under-going trial of Diamox     Chronic migraine w/o aura w/o status migrainosus, not intractable  Assessment & Plan  Long history of migraine disorder, chronic pain, anxiety/depression, follows with [facility]   No current migraine     Plan:  • Continue with trial of Diamox for prevention   • Migraine cocktail if needed for acute attack      COPD (chronic obstructive pulmonary disease) ([address])  Assessment & Plan  Without evidence of acute exacerbation    Plan:  • No reported PTA inhalers   • Continue supportive care       Paroxysmal atrial fibrillation ([address])  Assessment & Plan  Noted from 2020     Plan:  • HR well controlled on admission  • Continue PTA metoprolol XL 100mg, diltiazem 120mg   • Holding eliquis given rectal bleeding recheck Hgb      * Colitis  Assessment & Plan  Presents with diffuse abdominal pain, nausea, vomiting, rectal bleeding. Denies fevers. Leukocytosis and Hgb 16 which may represent hemoconcentration. CT abd with extensive colitis without percolonic free-air or abscess     Plan:  • Infectious vs inflammatory, HX of IBS, colonoscopy in 2022 fairly unremarkable   • Trend Hgb with ongoing rectal bleeding, holding eliquis pending AM Hgb   • Reported constipation too, start bowel regimen   • Check procal  • Start ceftriaxone/flagyl        VTE Pharmacologic Prophylaxis: VTE Score: 3 Moderate Risk (Score 3-4) - Pharmacological DVT Prophylaxis Ordered: apixaban (Eliquis). Code Status: Level 1 - Full Code   Discussion with family: Patient declined call to .      Anticipated Length of Stay: Patient will be admitted on an inpatient basis with an anticipated length of stay of greater than 2 midnights secondary to colitis. Total Time Spent on Date of Encounter in care of patient: 55 minutes This time was spent on one or more of the following: performing physical exam; counseling and coordination of care; obtaining or reviewing history; documenting in the medical record; reviewing/ordering tests, medications or procedures; communicating with other healthcare professionals and discussing with patient's family/caregivers. Chief Complaint: rectal bleeding    History of Present Illness:  Evert Urena is a 70 y.o. female with a PMH of  A-fib, anxiety, asthma, chronic back pain, GERD, HTN, HLD who presents with lower GI bleeding. History obtained from patient/chart review. She presents tonight for rectal bleeding. Patient states that she has been constipated for a while and was straining without bowel movements. She noticed that she had a lot of bright red blood with clots. She has been having diffuse abdominal pain over the past few days. This was associated nausea/vomiting. She denies fevers at home. No recent sick contacts. No history of crohn's or UC. Review of Systems:  Review of Systems   Constitutional: Negative for chills and fever. Respiratory: Negative for shortness of breath. Cardiovascular: Negative for chest pain and palpitations. Gastrointestinal: Positive for abdominal pain, blood in stool and nausea. Negative for diarrhea and vomiting. Neurological: Negative for syncope.        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"   • History of pneumonia    • Hives    • Hyperlipidemia    • Hypertension    • Infertility, female    • Interstitial cystitis    • Migraine     sees neurologist   • Motion sickness    • Palpitations    • PONV (postoperative nausea and vomiting)    • Premature atrial contractions 11/9/2022   • Psychiatric disorder    • TIA (transient ischemic attack) 09/2022    per pt --"had MRI and 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"   • Urinary incontinence     wears Pads   • Venous insufficiency 8/1/2017   • Wears glasses        Past Surgical History:   Procedure Laterality Date   • COLONOSCOPY     • DILATION AND CURETTAGE OF UTERUS     • EGD     • EXPLORATORY LAPAROTOMY      for infertility   • HAND SURGERY      Hand excision of tendon cyst   • NM LAPAROSCOPIC APPENDECTOMY N/A 05/03/2022    Procedure: APPENDECTOMY LAPAROSCOPIC;  Surgeon: Alo Fairbanks MD;  Location: [facility];  Service: General   • NM TEAEC W/PATCH GRF CAROTID VERTB [address] Left 1/31/2023    Procedure: EXPLORATION OF LEFT CAROTID ARTERY; ABORTED ENDARTERECTOMY ARTERY CAROTID;  Surgeon: Lizzie Carlton DO;  Location: [facility];  Service: Vascular   • SUPERIOR MESTENTERIC ARTERY STENT     • WISDOM TOOTH EXTRACTION         Meds/Allergies:  Prior to Admission medications    Medication Sig Start Date End Date Taking?  Authorizing Provider   Ascorbic Acid (vitamin C) 1000 MG tablet Take 1,000 mg by mouth daily   Yes Historical Provider, MD   aspirin 81 mg chewable tablet Chew 1 tablet (81 mg total) daily 9/4/22  Yes Bev Mendez MD   Biotin w/ Vitamins C & E (HAIR/SKIN/NAILS PO) Take by mouth   Yes Historical Provider, MD   Cholecalciferol 25 MCG (1000 UT) tablet Take 1,000 Units by mouth daily   Yes Historical Provider, MD   clotrimazole-betamethasone (Samella Mulberry) 1-0.05 % cream  3/2/22  Yes Historical Provider, MD   dexamethasone (DECADRON) 4 mg tablet 1 tab PO with breakfast for 1 to 5 days for unrelenting migraine 6/28/23  Yes Adam Tony MD   diltiazem (CARDIZEM SR) 120 mg 12 hr capsule Take 1 capsule (120 mg total) by mouth in the morning 7/7/23 7/1/24 Yes Mal Wynne DO   Eliquis 5 MG TAKE ONE TABLET BY MOUTH TWICE DAILY 5/8/23  Yes Jami Lynn DO   famotidine (PEPCID) 20 mg tablet Take 20 mg by mouth daily at bedtime   Yes Historical Provider, MD   fexofenadine (ALLEGRA) 180 MG tablet Take 180 mg by mouth daily   Yes Historical Provider, MD   fluticasone-vilanterol (BREO ELLIPTA) 200-25 MCG/INH inhaler Inhale 1 puff daily Rinse mouth after use. 3/1/21  Yes Mal Wynne DO   furosemide (LASIX) 40 mg tablet Take 1 tablet (40 mg total) by mouth daily 6/21/23  Yes Mal Wynne DO   hydrOXYzine HCL (ATARAX) 25 mg tablet TAKE TWO TABLETS BY MOUTH DAILY AT BEDTIME 5/26/23  Yes Nathaly Marie MD   LORazepam (ATIVAN) 1 mg tablet Take 1 tablet (1 mg total) by mouth every 6 (six) hours as needed for anxiety 7/18/23  Yes Mal Wynne DO   metoprolol succinate (TOPROL-XL) 100 mg 24 hr tablet Take 1 tablet (100 mg total) by mouth 2 (two) times a day 5/23/23  Yes Jami Lynn DO   montelukast (SINGULAIR) 10 mg tablet TAKE ONE TABLET BY MOUTH DAILY AT BEDTIME 4/17/23  Yes Mal Wynne DO   MULTIPLE VITAMIN PO Take by mouth   Yes Historical Provider, MD   phenazopyridine (PYRIDIUM) 200 mg tablet Take 1 tablet (200 mg total) by mouth 3 (three) times a day as needed for bladder spasms Not to be used for more than 2 consecutive days.  6/26/23  Yes JUD Blanc   promethazine (PHENERGAN) 25 mg tablet Take 1 tablet (25 mg total) by mouth every 6 (six) hours as needed for nausea or vomiting 6/30/23 8/29/23 Yes Gianni Montes De Oca MD   spironolactone (ALDACTONE) 25 mg tablet TAKE ONE TABLET BY MOUTH TWICE DAILY 5/24/23  Yes Mal Wynne DO   sucralfate (CARAFATE) 1 g tablet Take 1 tablet (1 g total) by mouth 2 (two) times a day before meals 7/18/23  Yes Suzanne Osborn PA-C   traMADol (Ultram) 50 mg tablet Take 2 tablets (100 mg total) by mouth every 8 (eight) hours as needed for moderate pain 6/26/23  Yes Savanah Alexander,    acetaZOLAMIDE (DIAMOX) 125 mg tablet Take 1 tab PO in a.m. and in p.m. for 1 week, then 1 tab in a.m. and 2 tabs in p.m. for 1 week, then 2 tabs in a.m. and 2 tabs in p.m. for 1 week, then 2 tabs in a.m. and 3 tabs in p.m. for 1 week, then 3 tabs in a.m. and 3 tabs in p.m. for 1 week, then 3 tabs in a.m. and 4 tabs in p.m. for 1 week, then 4 tabs in a.m. and 4 tabs in p.m. and continue. Patient not taking: Reported on 7/7/2023 6/28/23   Andi Swann MD   Docusate Sodium (COLACE PO) Take by mouth daily at bedtime  Patient not taking: Reported on 7/19/2023    Historical Provider, MD   estrogens, conjugated (Premarin) vaginal cream Insert 0.5 g into the vagina every other day Do not use applicator-1 fingertip dab to the urethra Monday Wednesdays and Fridays  Patient not taking: Reported on 7/19/2023 7/6/23   Aminata Almanzar MD   omeprazole (PriLOSEC) 40 MG capsule Take 1 capsule (40 mg total) by mouth 2 (two) times a day before meals  Patient not taking: Reported on 7/19/2023 7/18/23 8/17/23  Crystal Fofana PA-C   ondansetron (Zofran ODT) 4 mg disintegrating tablet Take 1 tablet (4 mg total) by mouth every 6 (six) hours as needed for nausea or vomiting  Patient not taking: Reported on 7/19/2023 3/20/23   Harinder Aparicio PA-C   Probiotic Product (PROBIOTIC-10 PO) Take 1 tablet by mouth in the morning  Patient not taking: Reported on 7/19/2023    Historical Provider, MD Patricia Atkins 762 MG/ML SOAJ Inject 140mg under the skin every 2 weeks. 4/5/23   Jami Lynn DO   Zinc 100 MG TABS Take by mouth daily  Patient not taking: Reported on 7/19/2023    Historical Provider, MD IQBAL have reviewed home medications using recent Epic encounter. Allergies: Allergies   Allergen Reactions   • Ace Inhibitors Angioedema and Anaphylaxis     Anaphylaxis   • Benicar [Olmesartan] Angioedema     See Allergy note from 9/11/2008.   Swollen ankles/legs   • Hydralazine Other (See Comments)     Lip swelling  Flank pain   • Other Anaphylaxis and Other (See Comments)     Other reaction(s): angioadema  Other reaction(s): Other (See Comments)  Preservatives- itching throat closes, hi  Other reaction(s): angioadema  E Z Cat - hives throat closes itching,  Preservatives- itching throat closes, hi  Artificial sweeteners   • Valsartan Angioedema     Lips, face swollen   • Sulfa Antibiotics Hives     stuffiness,itching,hives,throat closing--per pt "sometimes able to take depending on the brand and the filler or possibly preservatives in it"   • Ampicillin Hives     Depends on brand some preservatives can react. Has recently tolerated oral amoxicillin.    • Motrin [Ibuprofen] Other (See Comments)     Per pt " told not to take due to A Fib"   • Wound Dressing Adhesive Other (See Comments)     Per pt Adhesive on EKG leads caused redness       Social History:  Marital Status:    Occupation:   Patient Pre-hospital Living Situation: Home  Patient Pre-hospital Level of Mobility: walks  Patient Pre-hospital Diet Restrictions:   Substance Use History:   Social History     Substance and Sexual Activity   Alcohol Use Never     Social History     Tobacco Use   Smoking Status Former   • Packs/day: 0.50   • Years: 10.00   • Total pack years: 5.00   • Types: Cigarettes   • Quit date:    • Years since quittin.5   • Passive exposure: Never   Smokeless Tobacco Never     Social History     Substance and Sexual Activity   Drug Use No       Family History:  Family History   Problem Relation Age of Onset   • Lung cancer Mother 61   • Brain cancer Mother 61   • Diabetes Father    • Depression Father    • Other Father         septic   • Allergies Sister    • Hashimoto's thyroiditis Sister    • Abdominal aortic aneurysm Sister    • Diabetes Sister    • No Known Problems Sister    • No Known Problems Maternal Grandmother    • No Known Problems Maternal Grandfather    • No Known Problems Paternal Grandmother • No Known Problems Paternal Grandfather    • Hodgkin's lymphoma Brother    • No Known Problems Brother    • No Known Problems Maternal Aunt    • Diabetes Other        Physical Exam:     Vitals:   Blood Pressure: 156/86 (07/19/23 0600)  Pulse: (!) 107 (07/19/23 0600)  Temperature: 98.3 °F (36.8 °C) (07/19/23 0502)  Temp Source: Oral (07/19/23 0502)  Respirations: 18 (07/19/23 0600)  Height: 5' 4" (162.6 cm) (07/19/23 0502)  Weight - Scale: 86.2 kg (190 lb 0.6 oz) (07/19/23 0502)  SpO2: 94 % (07/19/23 0600)    Physical Exam  Vitals and nursing note reviewed. Constitutional:       General: She is not in acute distress. Appearance: She is well-developed. HENT:      Head: Normocephalic and atraumatic. Eyes:      Conjunctiva/sclera: Conjunctivae normal.   Cardiovascular:      Rate and Rhythm: Normal rate and regular rhythm. Heart sounds: No murmur heard. Pulmonary:      Effort: Pulmonary effort is normal. No respiratory distress. Breath sounds: Normal breath sounds. Abdominal:      General: There is no distension. Palpations: Abdomen is soft. Tenderness: There is abdominal tenderness. Musculoskeletal:         General: No swelling. Cervical back: Neck supple. Skin:     General: Skin is warm and dry. Capillary Refill: Capillary refill takes 2 to 3 seconds. Neurological:      Mental Status: She is alert.    Psychiatric:         Mood and Affect: Mood normal.          Additional Data:     Lab Results:  Results from last 7 days   Lab Units 07/18/23  2244   WBC Thousand/uL 27.64*   HEMOGLOBIN g/dL 16.0*   HEMATOCRIT % 47.8*   PLATELETS Thousands/uL 249   NEUTROS PCT % 89*   LYMPHS PCT % 4*   MONOS PCT % 6   EOS PCT % 0     Results from last 7 days   Lab Units 07/18/23  2244   SODIUM mmol/L 138   POTASSIUM mmol/L 3.3*   CHLORIDE mmol/L 105   CO2 mmol/L 19*   BUN mg/dL 18   CREATININE mg/dL 0.99   ANION GAP mmol/L 14   CALCIUM mg/dL 9.5   ALBUMIN g/dL 4.7   TOTAL BILIRUBIN mg/dL 0. 47   ALK PHOS U/L 118*   ALT U/L 14   AST U/L 17   GLUCOSE RANDOM mg/dL 149*     Results from last 7 days   Lab Units 07/18/23  2244   INR  1.11             Results from last 7 days   Lab Units 07/19/23  0120 07/18/23  2244   LACTIC ACID mmol/L 2.6* 2.3*       Lines/Drains:  Invasive Devices     Peripheral Intravenous Line  Duration           Peripheral IV 07/18/23 Left Antecubital <1 day                    Imaging: Reviewed radiology reports from this admission including: abdominal/pelvic CT and Personally reviewed the following imaging: abdominal/pelvic CT  CT abdomen pelvis with contrast   Final Result by Christine Daniel MD (07/19 0148)      Extensive colitis as described above without pericolonic free air or abscess. Recommend follow-up colonoscopy when clinically appropriate. Workstation performed: [id number]             EKG and Other Studies Reviewed on Admission:   · EKG: No EKG obtained. ** Please Note: This note has been constructed using a voice recognition system.  **

## 2023-07-19 NOTE — ASSESSMENT & PLAN NOTE
Without evidence of acute exacerbation    Plan:  • No reported PTA inhalers   • Continue supportive care

## 2023-07-19 NOTE — ASSESSMENT & PLAN NOTE
· Pt was admitted 9/22 on the stroke pathway:  MRI negative and sx were attributed to migrane and discharged on ASA and eliquis  · She was diagnosed with left ICA stenosis 80% and underwent unsuccessful CEA with subsequent left facial numbness and left facial asymmetry  · Admitted 2/23 with left facial droop and BUE paresthesias with negative stroke work up that admission  · Carotid stenosis managed medically

## 2023-07-19 NOTE — ASSESSMENT & PLAN NOTE
· Long history of migraine disorder follows with [facility] and  Neurology   • Started on Diamox, but she was concerned about effects and decreased dose to 1 tab qam  • Takes eccedrine migrane prn prn decadron  • Has essentially weaned herself off fioricet

## 2023-07-19 NOTE — ASSESSMENT & PLAN NOTE
Presents with diffuse abdominal pain, nausea, vomiting, rectal bleeding. Denies fevers. Leukocytosis and Hgb 16 which may represent hemoconcentration.  CT abd with extensive colitis without percolonic free-air or abscess     Plan:  • Infectious vs inflammatory, HX of IBS, colonoscopy in 2022 fairly unremarkable   • Trend Hgb with ongoing rectal bleeding, holding eliquis pending AM Hgb   • Reported constipation too, start bowel regimen   • Check procal  • Start ceftriaxone/flagyl

## 2023-07-19 NOTE — ASSESSMENT & PLAN NOTE
71 yo female former smoker, with PMH HTN, HLD, pAfib (Eliquis), CAD, s/p PPM, COPD, CAMILLE, hx of SMA Thrombosis s/p BES @ [facility], obesity, hx of TIA, and asymptomatic L ICA stenosis (not amenable to intervention 2/2 anatomy) presented with c/o abdominal pain, n/v, ?blood stools, and weakness she reports worsening after large bowel movement. Vascular was consulted for recommendations regarding CTA findings. Diagnostics:  -CT w/ and w/o 7/19/23: No significant interval change in the appearance of the major arteries since at least 5/25/2023. No aortic or major arterial occlusion. Proximal SMA stent, patent. Cannot exclude some degree of stenosis at the origin of the RAMBO, however not significantly  changed. Redemonstrated is colitis somewhat matching the RAMBO distribution but sparing sigmoid colon and rectum. No pneumatosis, abscess, perforation. -CTA 7/18/23: Extensive colitis as described above without pericolonic free air or abscess. Labs  Lactic Acid 2.3, 2.6,4.0,4.7  WBC 27.90    Recommendations  -Ischemic colitis in the setting of possible RAMBO stenosis in patient with history of SMA stent which is widely patent.  -No further vascular imaging or intervention indicated at this time.   Do not typically intervene on RAMBO's especially given remaining 2 vessels are widely patent.  -Continue IVF/antibiotics  -If patient fails to respond can consider IR consult for arotogram  -Rest of care per GI/medicine team  - d/w SLIM  - D/w Dr Kat Looney

## 2023-07-19 NOTE — ANESTHESIA POSTPROCEDURE EVALUATION
Post-Op Assessment Note    CV Status:  Stable    Pain management: adequate     Mental Status:  Awake   Hydration Status:  Stable   PONV Controlled:  Controlled   Airway Patency:  Patent      Post Op Vitals Reviewed: Yes      Staff: Anesthesiologist   Comments: ischemic colon on fle sig, plan to gotoOR tonight for exlap colectomy        No notable events documented.     BP      Temp      Pulse     Resp      SpO2      /80   Pulse 82   Temp 99 °F (37.2 °C) (Oral)   Resp 16   Ht 5' 4" (1.626 m)   Wt 86.2 kg (190 lb 0.6 oz)   LMP  (LMP Unknown)   SpO2 94%   BMI 32.62 kg/m²

## 2023-07-19 NOTE — ASSESSMENT & PLAN NOTE
· Pt presented with abd pain, n/v and rectal bleeding  · Pt had constipation x 5d until yesterday:  Then passed hard BM yesterday and has had BRBPR multiple times since yesterday, as streams of BRB passing, no stool  · Cont ivf  · Hb initially hemoconcentrated:   Follow H/H  · Will confer with GI  · Pt on eliquis, ASA PTA- temporarily on hold

## 2023-07-19 NOTE — CONSULTS
[facility]  Consult  Name: Doreen Jordan 70 y.o. female I MRN: [id number]  Unit/Bed#: E5 -01 I Date of Admission: 7/18/2023   Date of Service: 7/19/2023 I Hospital Day: 0    Inpatient consult to Vascular Surgery  Consult performed by: JUD Posey  Consult ordered by: Van Christy MD        Assessment/Plan   * Colitis  Assessment & Plan  69 yo female former smoker, with PMH HTN, HLD, pAfib (Eliquis), CAD, s/p PPM, COPD, CAMILLE, hx of SMA Thrombosis s/p BES @ [facility], obesity, hx of TIA, and asymptomatic L ICA stenosis (not amenable to intervention 2/2 anatomy) presented with c/o abdominal pain, n/v, ?blood stools, and weakness she reports worsening after large bowel movement. Vascular was consulted for recommendations regarding CTA findings. Diagnostics:  -CT w/ and w/o 7/19/23: No significant interval change in the appearance of the major arteries since at least 5/25/2023. No aortic or major arterial occlusion. Proximal SMA stent, patent. Cannot exclude some degree of stenosis at the origin of the RAMBO, however not significantly  changed. Redemonstrated is colitis somewhat matching the RAMBO distribution but sparing sigmoid colon and rectum. No pneumatosis, abscess, perforation. -CTA 7/18/23: Extensive colitis as described above without pericolonic free air or abscess. Labs  Lactic Acid 2.3, 2.6,4.0,4.7  WBC 27.90    Recommendations  -Ischemic colitis in the setting of possible RAMBO stenosis in patient with history of SMA stent which is widely patent.  -No further vascular imaging or intervention indicated at this time.   Do not typically intervene on RAMBO's especially given remaining 2 vessels are widely patent.  -Continue IVF/antibiotics  -If patient fails to respond can consider IR consult for arotogram  -Rest of care per GI/medicine team  - d/w SLIM  - D/w Dr Mary Jose: SLIM    Chief Complaint: abdominal pain, RAMBO stenosis    HPI: Doreen Jordan is a 70 y.o. female with obesity, HTN, HLD, A-fib on Eliquis, CAD s/p PPM, COPD, CAMILLE, h/o SMA stenting @ [facility], h/o TIA and asymptomatic L ICA stenosis not amenable to intervention secondary to anatomy presented with complaints of abdominal pain, nausea vomiting and bloody stools. She reported she had been constipated for 5-7 days with large difficult bowel movement yesterday and worsening pain after prompting evaluation in the ED. CTA obtained and vascular was consulted for evaluation recommendations. CTA reviewed by Dr. Jono Jenkins without significant concerns. Upon review patent RAMBO, SMA, and celiac arteries. Follow-up radiologist reading suggests some degree of stenosis at the origin of the RAMBO however not significantly changed from past imaging. Patient has patent SMA and stent, and celiac arteries. Even with possible RAMBO stenosis, we would not typically intervene as the other 2 vessels are patent. No vascular imaging or intervention indicated at this time. Continue resuscitation medical therapy, and general surgery consult for worsening abdominal pain.   If patient fails to respond, can consider IR consult for aortogram.     Review of Systems:  General: positive for  - fatigue  Cardiovascular: no chest pain or dyspnea on exertion  Respiratory: no cough, shortness of breath, or wheezing  Gastrointestinal: positive for - abdominal pain, blood in stools, change in bowel habits, constipation and nausea/vomiting  Genitourinary ROS: no dysuria, trouble voiding, or hematuria  Musculoskeletal ROS: negative  Neurological ROS: no TIA or stroke symptoms    Past Medical History:  Past Medical History:   Diagnosis Date   • A-fib ([address]) 03/2020   • Allergic    • Anxiety    • Arthritis    • Asthma    • Back pain     and muscle pain   • Bruises easily    • Chronic pain disorder     Interstitial pain   • Colon polyp    • Dental bridge present    • Depression    • Eczema    • GERD (gastroesophageal reflux disease)    • Heart murmur    • History of blood clots     per pt "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"   • History of pneumonia    • Hives    • Hyperlipidemia    • Hypertension    • Infertility, female    • Interstitial cystitis    • Migraine     sees neurologist   • Motion sickness    • Palpitations    • PONV (postoperative nausea and vomiting)    • Premature atrial contractions 2022   • Psychiatric disorder    • TIA (transient ischemic attack) 2022    per pt --"had MRI and 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"   • Urinary incontinence     wears Pads   • Venous insufficiency 2017   • Wears glasses        Past Surgical History:  Past Surgical History:   Procedure Laterality Date   • COLONOSCOPY     • DILATION AND CURETTAGE OF UTERUS     • EGD     • EXPLORATORY LAPAROTOMY      for infertility   • HAND SURGERY      Hand excision of tendon cyst   • ND LAPAROSCOPIC APPENDECTOMY N/A 2022    Procedure: APPENDECTOMY LAPAROSCOPIC;  Surgeon:  Anushka Burrell MD;  Location: [facility];  Service: General   • ND TEAEC W/PATCH GRF CAR[address]ID VERTB [address]    Procedure: EXPLORATION OF LEFT CAROTID ARTERY; ABORTED ENDARTERECTOMY ARTERY CAROTID;  Surgeon: Aristides Mejia DO;  Location: [facility];  Service: Vascular   • SUPERIOR MESTENTERIC ARTERY STENT     • WISDOM TOOTH EXTRACTION         Social History:  Social History     Substance and Sexual Activity   Alcohol Use Never     Social History     Substance and Sexual Activity   Drug Use No     Social History     Tobacco Use   Smoking Status Former   • Packs/day: 0.50   • Years: 10.00   • Total pack years: 5.00   • Types: Cigarettes   • Quit date:    • Years since quittin.5   • Passive exposure: Never   Smokeless Tobacco Never       Family History:  Family History   Problem Relation Age of Onset   • Lung cancer Mother 61   • Brain cancer Mother 61   • Diabetes Father    • Depression Father    • Other Father         septic   • Allergies Sister    • Hashimoto's thyroiditis Sister    • Abdominal aortic aneurysm Sister    • Diabetes Sister    • No Known Problems Sister    • No Known Problems Maternal Grandmother    • No Known Problems Maternal Grandfather    • No Known Problems Paternal Grandmother    • No Known Problems Paternal Grandfather    • Hodgkin's lymphoma Brother    • No Known Problems Brother    • No Known Problems Maternal Aunt    • Diabetes Other        Allergies: Allergies   Allergen Reactions   • Ace Inhibitors Angioedema and Anaphylaxis     Anaphylaxis   • Benicar [Olmesartan] Angioedema     See Allergy note from 9/11/2008. Swollen ankles/legs   • Hydralazine Other (See Comments)     Lip swelling  Flank pain   • Other Anaphylaxis and Other (See Comments)     Other reaction(s): angioadema  Other reaction(s): Other (See Comments)  Preservatives- itching throat closes, hi  Other reaction(s): angioadema  E Z Cat - hives throat closes itching,  Preservatives- itching throat closes, hi  Artificial sweeteners   • Valsartan Angioedema     Lips, face swollen   • Sulfa Antibiotics Hives     stuffiness,itching,hives,throat closing--per pt "sometimes able to take depending on the brand and the filler or possibly preservatives in it"   • Ampicillin Hives     Depends on brand some preservatives can react. Has recently tolerated oral amoxicillin.    • Motrin [Ibuprofen] Other (See Comments)     Per pt " told not to take due to A Fib"   • Wound Dressing Adhesive Other (See Comments)     Per pt Adhesive on EKG leads caused redness       Medications:  Current Facility-Administered Medications   Medication Dose Route Frequency   • acetaminophen (TYLENOL) tablet 650 mg  650 mg Oral Q6H PRN   • acetaZOLAMIDE (DIAMOX) tablet 125 mg  125 mg Oral HS   • aluminum-magnesium hydroxide-simethicone (MAALOX) oral suspension 30 mL  30 mL Oral Q6H PRN   • cefTRIAXone (ROCEPHIN) IVPB (premix in dextrose) 1,000 mg 50 mL  1,000 mg Intravenous Q24H   • diltiazem (CARDIZEM SR) 12 hr capsule 120 mg  120 mg Oral Daily   • famotidine (PEPCID) tablet 20 mg  20 mg Oral HS   • LORazepam (ATIVAN) tablet 1 mg  1 mg Oral Q6H PRN   • metoprolol succinate (TOPROL-XL) 24 hr tablet 100 mg  100 mg Oral BID   • metroNIDAZOLE (FLAGYL) IVPB (premix) 500 mg 100 mL  500 mg Intravenous Q8H   • morphine injection 4 mg  4 mg Intravenous Q4H PRN   • ondansetron (ZOFRAN) injection 4 mg  4 mg Intravenous Q6H PRN   • polyethylene glycol (MIRALAX) packet 17 g  17 g Oral Daily PRN   • sodium chloride 0.9 % bolus 1,000 mL  1,000 mL Intravenous Once   • sodium chloride 0.9 % infusion  125 mL/hr Intravenous Continuous   • traMADol (ULTRAM) tablet 50 mg  50 mg Oral Q8H PRN       Vitals:  /70   Pulse 94   Temp 99 °F (37.2 °C)   Resp 17   Ht 5' 4" (1.626 m)   Wt 86.2 kg (190 lb 0.6 oz)   LMP  (LMP Unknown)   SpO2 95%   BMI 32.62 kg/m²     I/Os:  I/O last 24 hours:   In: 1256.7 [I.V.:206.7; IV Piggyback:1050]  Out: 400 [Urine:400]    Lab Results and Cultures:   Lab Results   Component Value Date    WBC 27.90 (H) 07/19/2023    HGB 15.4 07/19/2023    HCT 45.5 07/19/2023    MCV 89 07/19/2023     07/19/2023     Lab Results   Component Value Date    CALCIUM 9.3 07/19/2023     11/13/2017    K 3.6 07/19/2023    CO2 18 (L) 07/19/2023     07/19/2023    BUN 18 07/19/2023    CREATININE 0.97 07/19/2023     Lab Results   Component Value Date    INR 1.11 07/18/2023    INR 1.14 02/20/2023    INR 1.00 02/01/2023    PROTIME 14.4 07/18/2023    PROTIME 14.6 (H) 02/20/2023    PROTIME 13.2 02/01/2023       Lipid Panel:   Lab Results   Component Value Date    CHOL 413 (H) 11/13/2017   ,     Blood Culture: No results found for: "Mario Holland",   Urinalysis:   Lab Results   Component Value Date    COLORU Dark Yellow 07/18/2023    CLARITYU Clear 07/18/2023    SPECGRAV 1.022 07/18/2023    PHUR 5.5 07/18/2023    LEUKOCYTESUR Negative 07/18/2023    NITRITE Negative 07/18/2023    GLUCOSEU Negative 07/18/2023    KETONESU Negative 07/18/2023    BILIRUBINUR Negative 07/18/2023    BLOODU Negative 07/18/2023   ,   Urine Culture:   Lab Results   Component Value Date    URINECX >100,000 cfu/ml Escherichia coli (A) 05/16/2023   ,   Wound Culure: No results found for: "WOUNDCULT"    Imaging:  As above    Physical Exam:    General appearance: alert and oriented, in no acute distress pale  Skin: Skin color, texture, turgor normal. No rashes or lesions  Neurologic: Grossly normal  Lungs: clear to auscultation bilaterally  Chest wall: no tenderness  Heart: regular rate and rhythm, S1, S2 normal, no murmur, click, rub or gallop  Abdomen: soft , tendar to palpation, no distention, +BS    Wound/Incision:  n/a    Pulse exam:  Radial: Right: 2+ Left[de-identified] 2+      Kimberlee Duverney, CRNP  7/19/2023  [facility], [phone]

## 2023-07-19 NOTE — ASSESSMENT & PLAN NOTE
• Follows with Cardiologist, Dr. Kayla Bass  • Home regimen: metoprolol XL 100mg, diltiazem 120mg   • Holding eliquis temporarily, until confer with GI/surgery

## 2023-07-19 NOTE — ASSESSMENT & PLAN NOTE
· Followed by Neurology  · Suspected to be exacerbated by untreated CAMILLE:  Referred for sleep study   · Cont home diamox

## 2023-07-19 NOTE — ASSESSMENT & PLAN NOTE
· Per cardiology office noted, pt has HTN that has historically been difficult to control  · Cont home metoprolol 100mg bid, diltiazem 180mg daily  · Hold home lasix/aldactone due to volume depletion/lactic acidosis

## 2023-07-19 NOTE — ANESTHESIA PREPROCEDURE EVALUATION
Procedure:  LAPAROTOMY EXPLORATORY W/ BOWEL RESECTION (Abdomen)    Relevant Problems   CARDIO   (+) Angina pectoris (HCC)   (+) Chronic migraine w/o aura w/o status migrainosus, not intractable   (+) Essential hypertension   (+) Hypertensive heart disease with congestive heart failure (HCC)   (+) Left carotid stenosis   (+) Migraines   (+) Paroxysmal atrial fibrillation (HCC)      GI/HEPATIC   (+) Gastroesophageal reflux disease without esophagitis   (+) Hepatic steatosis      NEURO/PSYCH   (+) Chronic migraine w/o aura w/o status migrainosus, not intractable   (+) Migraines      PULMONARY   (+) COPD (chronic obstructive pulmonary disease) (HCC)   (+) CAMILLE (obstructive sleep apnea)        Physical Exam    Airway    Mallampati score: I  TM Distance: >3 FB  Neck ROM: full     Dental       Cardiovascular  Rhythm: irregular, Rate: normal,     Pulmonary  Pulmonary exam normal     Other Findings        Anesthesia Plan  ASA Score- 3 Emergent    Anesthesia Type- general with ASA Monitors. Additional Monitors: arterial line. Airway Plan: ETT. Plan Factors-Exercise tolerance (METS): >4 METS. Chart reviewed. Existing labs reviewed. Patient summary reviewed. Patient is not a current smoker. Patient did not smoke on day of surgery. Obstructive sleep apnea risk education given perioperatively. Induction- intravenous. Postoperative Plan-     Informed Consent- Anesthetic plan and risks discussed with patient. I personally reviewed this patient with the CRNA. Discussed and agreed on the Anesthesia Plan with the CRNA. Jade Tim

## 2023-07-19 NOTE — CONSULTS
Consult Service: Gastroenterology      PATIENT INFORMATION      Latonia Perera 70 y.o. female MRN: [id number]  Unit/Bed#: ED-05 Encounter: [id number]  PCP: Janee Sy DO  Date of Admission:  7/18/2023  Date of Consultation: 07/19/23  Requesting Physician: Sandee Coats MD       ASSESSMENTS & PLAN   Latonia Perera is a 70 y.o. old female with PMH of appendicitis s/p appendectomy, left carotid artery stenosis, mesenteric artery thrombosis status post SMA stenting 2021, hypertension, A-fib on apixaban, obesity, PUD (on EGD 5/28/2019, thought to be NSAID induced), chronic abdominal pain of unclear etiology suspected to be secondary to IBS presenting with acute onset abdominal pain associated with hematochezia, with GI consulted for these symptoms. BRBPR  • CTA, Vascular Surgery Consult  • Continue antibiotics per primary team  • Trend CBC, lactate q8 hrs  • DDx includes mesenteric ischemia, AVMs, diverticular bleed, polyp/malignancy    Gastric Distention  · Noted on CT, mild   · Abdominal pain most likely secondary to suspected mesenteric ischemia as above    GERD  · Continue IV protonix 40 mg BID while admission    REASON FOR CONSULTATION      Hematochezia, abdominal pain    HISTORY OF PRESENT ILLNESS      Latonia Perera is a 70 y.o. female with PMH of appendicitis s/p appendectomy, left carotid artery stenosis, mesenteric artery thrombosis status post SMA stenting 2021, hypertension, A-fib on apixaban, obesity, PUD (on EGD 5/28/2019, thought to be NSAID induced), chronic abdominal pain of unclear etiology suspected to be secondary to IBS presenting with acute onset abdominal pain associated with hematochezia, with GI consulted for these symptoms. Patient was in her usual state of health till yesterday afternoon, at which time she developed acute onset right-sided abdominal pain associated with large quantity of bright red blood per rectum.  Noted that current pain is different in character to her usual abdominal pain, and was not able to recall prior episodes of hematochezia that were this profound. Admission labs w Hg stable at 16.0. Has leukocytosis to 27.64, BMP with bicarb of 19. CT abdomen pelvis w contrast showed mild gastric distention, normal small bowel loops, normal TI, "extensive circumferential wall thickening with mucosal hyperenhancement involving the transverse, descending, and sigmoid colon noted with extensive colitis. No pericolonic free air or abscess identified." Started on ceftriaxone and flagyl. Last BM was yesterday. REVIEW OF SYSTEMS     A thorough 12-point review of systems has been conducted. Pertinent positives and negatives are mentioned in the history of present illness.        PAST MEDICAL & SURGICAL HISTORY      Past Medical History:   Diagnosis Date   • A-fib ([address]) 03/2020   • Allergic    • Anxiety    • Arthritis    • Asthma    • Back pain     and muscle pain   • Bruises easily    • Chronic pain disorder     Interstitial pain   • Colon polyp    • Dental bridge present    • Depression    • Eczema    • GERD (gastroesophageal reflux disease)    • Heart murmur    • History of blood clots     per pt "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"   • History of pneumonia    • Hives    • Hyperlipidemia    • Hypertension    • Infertility, female    • Interstitial cystitis    • Migraine     sees neurologist   • Motion sickness    • Palpitations    • PONV (postoperative nausea and vomiting)    • Premature atrial contractions 11/9/2022   • Psychiatric disorder    • TIA (transient ischemic attack) 09/2022    per pt --"had MRI and 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"   • Urinary incontinence     wears Pads   • Venous insufficiency 8/1/2017   • Wears glasses        Past Surgical History:   Procedure Laterality Date   • COLONOSCOPY     • DILATION AND CURETTAGE OF UTERUS     • EGD     • EXPLORATORY LAPAROTOMY      for infertility   • HAND SURGERY      Hand excision of tendon cyst   • MA LAPAROSCOPIC APPENDECTOMY N/A 05/03/2022    Procedure: APPENDECTOMY LAPAROSCOPIC;  Surgeon: Raphael Tapia MD;  Location: [facility];  Service: General   • MA TEAEC W/PATCH GRF CAROTID VERTB [address] 1/31/2023    Procedure: EXPLORATION OF LEFT CAROTID ARTERY; ABORTED ENDARTERECTOMY ARTERY CAROTID;  Surgeon: Joanne Hall DO;  Location: [facility];  Service: Vascular   • SUPERIOR MESTENTERIC ARTERY STENT     • WISDOM TOOTH EXTRACTION           MEDICATIONS & ALLERGIES       Medications:   Prior to Admission medications    Medication Sig Start Date End Date Taking? Authorizing Provider   Ascorbic Acid (vitamin C) 1000 MG tablet Take 1,000 mg by mouth daily   Yes Historical Provider, MD   aspirin 81 mg chewable tablet Chew 1 tablet (81 mg total) daily 9/4/22  Yes Joshua Hernandez MD   Biotin w/ Vitamins C & E (HAIR/SKIN/NAILS PO) Take by mouth   Yes Historical Provider, MD   Cholecalciferol 25 MCG (1000 UT) tablet Take 1,000 Units by mouth daily   Yes Historical Provider, MD   clotrimazole-betamethasone ([place]) 1-0.05 % cream  3/2/22  Yes Historical Provider, MD   dexamethasone (DECADRON) 4 mg tablet 1 tab PO with breakfast for 1 to 5 days for unrelenting migraine 6/28/23  Yes Minh Watkins MD   diltiazem (CARDIZEM SR) 120 mg 12 hr capsule Take 1 capsule (120 mg total) by mouth in the morning 7/7/23 7/1/24 Yes Mal Wynne DO   Eliquis 5 MG TAKE ONE TABLET BY MOUTH TWICE DAILY 5/8/23  Yes Jami Lynn DO   famotidine (PEPCID) 20 mg tablet Take 20 mg by mouth daily at bedtime   Yes Historical Provider, MD   fexofenadine (ALLEGRA) 180 MG tablet Take 180 mg by mouth daily   Yes Historical Provider, MD   fluticasone-vilanterol (BREO ELLIPTA) 200-25 MCG/INH inhaler Inhale 1 puff daily Rinse mouth after use.  3/1/21  Yes Mal Wynne DO   furosemide (LASIX) 40 mg tablet Take 1 tablet (40 mg total) by mouth daily 6/21/23  Yes Mal Wynne DO   hydrOXYzine HCL (ATARAX) 25 mg tablet TAKE TWO TABLETS BY MOUTH DAILY AT BEDTIME 5/26/23  Yes Nicole Mcdermott MD   LORazepam (ATIVAN) 1 mg tablet Take 1 tablet (1 mg total) by mouth every 6 (six) hours as needed for anxiety 7/18/23  Yes Dustin Noe DO   metoprolol succinate (TOPROL-XL) 100 mg 24 hr tablet Take 1 tablet (100 mg total) by mouth 2 (two) times a day 5/23/23  Yes Jami Lynn DO   montelukast (SINGULAIR) 10 mg tablet TAKE ONE TABLET BY MOUTH DAILY AT BEDTIME 4/17/23  Yes Dustin Noe DO   MULTIPLE VITAMIN PO Take by mouth   Yes Slick Nash MD   phenazopyridine (PYRIDIUM) 200 mg tablet Take 1 tablet (200 mg total) by mouth 3 (three) times a day as needed for bladder spasms Not to be used for more than 2 consecutive days. 6/26/23  Yes JUD Blanc   promethazine (PHENERGAN) 25 mg tablet Take 1 tablet (25 mg total) by mouth every 6 (six) hours as needed for nausea or vomiting 6/30/23 8/29/23 Yes Kari Yang MD   spironolactone (ALDACTONE) 25 mg tablet TAKE ONE TABLET BY MOUTH TWICE DAILY 5/24/23  Yes Dustin Noe DO   sucralfate (CARAFATE) 1 g tablet Take 1 tablet (1 g total) by mouth 2 (two) times a day before meals 7/18/23  Yes Suzanne Osborn PA-C   traMADol (Ultram) 50 mg tablet Take 2 tablets (100 mg total) by mouth every 8 (eight) hours as needed for moderate pain 6/26/23  Yes Dipika Avalos DO   acetaZOLAMIDE (DIAMOX) 125 mg tablet Take 1 tab PO in a.m. and in p.m. for 1 week, then 1 tab in a.m. and 2 tabs in p.m. for 1 week, then 2 tabs in a.m. and 2 tabs in p.m. for 1 week, then 2 tabs in a.m. and 3 tabs in p.m. for 1 week, then 3 tabs in a.m. and 3 tabs in p.m. for 1 week, then 3 tabs in a.m. and 4 tabs in p.m. for 1 week, then 4 tabs in a.m. and 4 tabs in p.m. and continue.   Patient not taking: Reported on 7/7/2023 6/28/23   Jamie Cheung MD   Docusate Sodium (COLACE PO) Take by mouth daily at bedtime  Patient not taking: Reported on 7/19/2023    Historical Provider, MD   estrogens, conjugated (Premarin) vaginal cream Insert 0.5 g into the vagina every other day Do not use applicator-1 fingertip dab to the urethra Monday Wednesdays and Fridays  Patient not taking: Reported on 7/19/2023 7/6/23   Ba Bacon MD   omeprazole (PriLOSEC) 40 MG capsule Take 1 capsule (40 mg total) by mouth 2 (two) times a day before meals  Patient not taking: Reported on 7/19/2023 7/18/23 8/17/23  Benja Yu PA-C   ondansetron (Zofran ODT) 4 mg disintegrating tablet Take 1 tablet (4 mg total) by mouth every 6 (six) hours as needed for nausea or vomiting  Patient not taking: Reported on 7/19/2023 3/20/23   Wil Lowry PA-C   Probiotic Product (PROBIOTIC-10 PO) Take 1 tablet by mouth in the morning  Patient not taking: Reported on 7/19/2023    Historical Provider, MD Patricia Atkins 244 MG/ML SOAJ Inject 140mg under the skin every 2 weeks. 4/5/23   Jami Lynn DO   Zinc 100 MG TABS Take by mouth daily  Patient not taking: Reported on 7/19/2023    Historical Provider, MD       Allergies: Allergies   Allergen Reactions   • Ace Inhibitors Angioedema and Anaphylaxis     Anaphylaxis   • Benicar [Olmesartan] Angioedema     See Allergy note from 9/11/2008. Swollen ankles/legs   • Hydralazine Other (See Comments)     Lip swelling  Flank pain   • Other Anaphylaxis and Other (See Comments)     Other reaction(s): angioadema  Other reaction(s): Other (See Comments)  Preservatives- itching throat closes, hi  Other reaction(s): angioadema  E Z Cat - hives throat closes itching,  Preservatives- itching throat closes, hi  Artificial sweeteners   • Valsartan Angioedema     Lips, face swollen   • Sulfa Antibiotics Hives     stuffiness,itching,hives,throat closing--per pt "sometimes able to take depending on the brand and the filler or possibly preservatives in it"   • Ampicillin Hives     Depends on brand some preservatives can react. Has recently tolerated oral amoxicillin.    • Motrin [Ibuprofen] Other (See Comments)     Per pt " told not to take due to A Fib"   • Wound Dressing Adhesive Other (See Comments)     Per pt Adhesive on EKG leads caused redness         SOCIAL HISTORY      Marital Status:     Substance Use History:   Social History     Substance and Sexual Activity   Alcohol Use Never     Social History     Tobacco Use   Smoking Status Former   • Packs/day: 0.50   • Years: 10.00   • Total pack years: 5.00   • Types: Cigarettes   • Quit date:    • Years since quittin.5   • Passive exposure: Never   Smokeless Tobacco Never     Social History     Substance and Sexual Activity   Drug Use No         FAMILY HISTORY      Non-Contributory      PHYSICAL EXAM     Vitals:   Blood Pressure: 141/67 (23)  Pulse: 103 (23)  Temperature: 98.3 °F (36.8 °C) (23)  Temp Source: Oral (23)  Respirations: 18 (23 0600)  Height: 5' 4" (162.6 cm) (23)  Weight - Scale: 86.2 kg (190 lb 0.6 oz) (23)  SpO2: 96 % (23)    Physical Exam:   GENERAL: NAD  HEENT:  NC/AT, No Scleral Icterus  CARDIAC:  Regular Rate  PULMONARY:  Non-Labored Breathing, No Respiratory Distress  ABDOMEN:  Soft, No Rebound/Guarding/Rigidity, No Organomegaly, Diffuse abdominal tenderness Tenderness  Rectal: scarce amounts of BRBPR, no hemorrhoids palpable or visible  EXTREMITIES:  No Edema, Cyanosis, or Clubbing  NEUROLOGIC:  Alert  SKIN:  No Rashes or Erythema    ADDITIONAL DATA     Lab Results:       Lab Units 23  0728 23  2244 23  1130 23  1403 23  0547 23  1214 22  1752 22  0501   SODIUM mmol/L 139 138 134* 135 139 140   < > 140   POTASSIUM mmol/L 3.6 3.3* 3.8 4.7 3.8 3.9   < > 3.3*   CHLORIDE mmol/L 108 105 102 101 102 100   < > 104   CO2 mmol/L 18* 19* 28 26 29 30   < > 27   BUN mg/dL 18 18 15 22 10 23   < > 22   CREATININE mg/dL 0.97 0.99 0.87 0.66 0.81 0.99   < > 0.87   GLUCOSE RANDOM mg/dL 185* 149*  --  94 102 103   < > 103   CALCIUM mg/dL 9.3 9.5 9.3 9.4 9.4 9.5   < > 9.5   MAGNESIUM mg/dL  --   --   --   --   --   --   --  2.7*    < > = values in this interval not displayed. Lab Units 07/18/23  2244 05/23/23  1130 09/21/22  1329 06/06/22  1445 05/03/22  2131   TOTAL PROTEIN g/dL 7.7 7.4  7.3 7.5 8.2 7.5   ALBUMIN g/dL 4.7 4.1 3.9 4.7 4.2   TOTAL BILIRUBIN mg/dL 0.47 0.66 0.55 0.82 0.90   AST U/L 17 19 20 29 38   ALT U/L 14 21 28 46 35   ALK PHOS U/L 118* 121* 115 133* 117*           Lab Units 07/19/23  0728 07/18/23  2244 05/23/23  1130 02/20/23  1403 02/01/23  0547   WBC Thousand/uL 27.90* 27.64* 7.17 6.62 8.79   HEMOGLOBIN g/dL 15.4 16.0* 13.9 12.7 12.9   HEMATOCRIT % 45.5 47.8* 41.2 37.9 40.2   PLATELETS Thousands/uL 255 249 250 233 233   MCV fL 89 90 89 90 92       Lab Results   Component Value Date    FERRITIN 49 06/06/2022       Lab Results   Component Value Date    INR 1.11 07/18/2023    INR 1.14 02/20/2023    INR 1.00 02/01/2023    PROTIME 14.4 07/18/2023    PROTIME 14.6 (H) 02/20/2023    PROTIME 13.2 02/01/2023         Microbiology Results:        Imaging:  Procedure: CT abdomen pelvis with contrast    Result Date: 7/19/2023  Narrative: CT ABDOMEN AND PELVIS WITH IV CONTRAST INDICATION:   RLQ abdominal pain (Age >= 14y) Lower abd pain, bloody stools. COMPARISON: 5/25/2023. TECHNIQUE:  CT examination of the abdomen and pelvis was performed. Multiplanar 2D reformatted images were created from the source data. This examination, like all CT scans performed in the [facility], was performed utilizing techniques to minimize radiation dose exposure, including the use of iterative reconstruction and automated exposure control. Radiation dose length product (DLP) for this visit:  784 mGy-cm IV Contrast:  100 mL of iohexol (OMNIPAQUE) Enteric Contrast:  Enteric contrast was not administered.  FINDINGS: ABDOMEN LOWER CHEST:  No clinically significant abnormality identified in the visualized lower chest. LIVER/BILIARY TREE: Unremarkable. GALLBLADDER:  No calcified gallstones. No pericholecystic inflammatory change. SPLEEN:  Unremarkable. PANCREAS:  Unremarkable. ADRENAL GLANDS:  Unremarkable. KIDNEYS/URETERS:  Unremarkable. No hydronephrosis. STOMACH AND BOWEL: Stomach is mildly distended with air and fluid. No gross intraluminal mass or irregular wall thickening. Small bowel loops are normal in course and caliber without evidence for obstruction or inflammation. Terminal ileum is unremarkable. Appendix is surgically absent. Extensive circumferential wall thickening with mucosal hyperenhancement involving the transverse, descending, and sigmoid colon noted consistent with extensive colitis. No pericolonic free air or abscess identified. APPENDIX:  No findings to suggest appendicitis. ABDOMINOPELVIC CAVITY:  No ascites. No pneumoperitoneum. No lymphadenopathy. VESSELS:  Atherosclerotic changes are present. No evidence of aneurysm. PELVIS REPRODUCTIVE ORGANS:  Unremarkable for patient's age. URINARY BLADDER: Mildly distended and grossly unremarkable. ABDOMINAL WALL/INGUINAL REGIONS:  Unremarkable. OSSEOUS STRUCTURES:  No acute fracture or destructive osseous lesion. Impression: Extensive colitis as described above without pericolonic free air or abscess. Recommend follow-up colonoscopy when clinically appropriate. Workstation performed: [id number]     Narrative/Impressions - 3 day look back     EKG, Pathology, and Other Studies Reviewed on Admission:   · EKG: Reviewed    Counseling / Coordination of Care Time: 30 total mins spent n consult. Greater than 50% of total time spent on patient counseling and coordination of care. ............................................................................................................................................... Francis Leger M.D.  PGY-4 Gastroenterology Fellow  [facility]  Available on [email or web address]  Recommendations not final until attending attestation

## 2023-07-19 NOTE — ASSESSMENT & PLAN NOTE
· Pt presented with abd pain, CT with diffuse colitis and lactic acidosis  · Also hemoconcentrated and volume depleted  · Cont ivf  · eval in progress:  H/o mesenteric ischemia

## 2023-07-19 NOTE — ANESTHESIA PROCEDURE NOTES
Arterial Line Insertion    Performed by: Laurie Mi CRNA  Authorized by: Kaylan Garcia DO  Consent: The procedure was performed in an emergent situation. Patient identity confirmed: anonymous protocol, patient vented/unresponsive  Time out: Immediately prior to procedure a "time out" was called to verify the correct patient, procedure, equipment, support staff and site/side marked as required. Preparation: Patient was prepped and draped in the usual sterile fashion.   Indications: hemodynamic monitoring  Orientation:  Left  Location: radial artery  Procedure Details:  Needle gauge: 20

## 2023-07-19 NOTE — PROGRESS NOTES
[facility]  Progress Note  Name: Sarahy Kuo  MRN: [id number]  Unit/Bed#: ED-05 I Date of Admission: 7/18/2023   Date of Service: 7/19/2023 I Hospital Day: 0    Assessment/Plan   * Colitis  Assessment & Plan  Pt is a 69 yo female with PMH significant for Mesenteric artery thrombosis, s/p SMA stent 2021, atrial fibrillation on eliquis, migrane, and intracranial HTN who presented to the ER with abd pain, n/v and bloody stool.     · CT abd/pelvis; "Extensive circumferential wall thickening with mucosal hyperenhancement involving the transverse, descending, and sigmoid colon noted consistent with extensive colitis"  · Pt presented with leukocytosis of 27, tachycardia, and lactic acidosis  · Was started on empiric abx with ceftriaxone/flagyl  · Follows with GI, Dr. Maru Dotson as outpt for nausea, GERD, RUQ pain, early satiety:  Had CTA 5/23 with patent SMA stent and no other significant vascular occlusions  · Due to leukocytosis, abd pain, and rising lactate: will confer with GI   · Pt's home ASA/eliquis temporarily on hold      Lactic acidosis  Assessment & Plan  · Pt presented with abd pain, CT with diffuse colitis and lactic acidosis  · Also hemoconcentrated and volume depleted  · Cont ivf  · eval in progress:  H/o mesenteric ischemia    Paroxysmal atrial fibrillation [facility])  Assessment & Plan  • Follows with Cardiologist, Dr. Maru Dotson  • Home regimen: metoprolol XL 100mg, diltiazem 120mg   • Holding eliquis temporarily, until confer with GI/surgery        Left carotid stenosis  Assessment & Plan  · Pt was admitted 9/22 on the stroke pathway:  MRI negative and sx were attributed to migrane and discharged on ASA and eliquis  · She was diagnosed with left ICA stenosis 80% and underwent unsuccessful CEA with subsequent left facial numbness and left facial asymmetry  · Admitted 2/23 with left facial droop and BUE paresthesias with negative stroke work up that admission  · Carotid stenosis managed medically    Hematochezia  Assessment & Plan  · Pt presented with abd pain, n/v and rectal bleeding  · Pt had constipation x 5d until yesterday:  Then passed hard BM yesterday and has had BRBPR multiple times since yesterday, as streams of BRB passing, no stool  · Cont ivf  · Hb initially hemoconcentrated: Follow H/H  · Will confer with GI  · Pt on eliquis, ASA PTA- temporarily on hold    IIH (idiopathic intracranial hypertension)  Assessment & Plan  · Followed by Neurology  · Suspected to be exacerbated by untreated CAMILLE:  Referred for sleep study   · Cont home diamox    Chronic migraine w/o aura w/o status migrainosus, not intractable  Assessment & Plan  · Long history of migraine disorder follows with [facility] and  Neurology   • Started on Diamox, but she was concerned about effects and decreased dose to 1 tab qam  • Takes eccedrine migrane prn prn decadron  • Has essentially weaned herself off fioricet      COPD (chronic obstructive pulmonary disease) ([address])  Assessment & Plan  Without evidence of acute exacerbation  nebulizers as needed      Essential hypertension  Assessment & Plan  · Per cardiology office noted, pt has HTN that has historically been difficult to control  · Cont home metoprolol 100mg bid, diltiazem 180mg daily  · Hold home lasix/aldactone due to volume depletion/lactic acidosis           VTE Pharmacologic Prophylaxis: RX contraindicated due to: elquis at home-  held currently. will start heparin sq, unless GI recommends restarting eliquis  VTE Mechanical Prophylaxis: sequential compression device        Certification Statement: The patient will continue to require additional inpatient hospital stay due to need for further acute intervention for lactic acidosis, colitis    Status: inpatient     Addendum:  Communicated consult to on call gi, dr Liseth Rivas- will see in consultation  Recheck lactate now    Addendum#2- d/w GI:  Lactate increasing further.  Concerns for ischemic colitis:  CTA abd stat in process. Consulted vacular surgery for urgent eval.    ===================================================================    Subjective:  Patient notes she continues to have pain. She states her pain is worse in the right lower quadrant. Patient lates this is identical pain she has had since 2022. At that time she saw a surgeon and had appendectomy. She notes her pain has been constant since that time however progressively worse over the past week. Patient notes her pain is localized in the right lower quadrant. She denies any exacerbating or alleviating factors. She notes she started Diamox last month and feels that this contributed to constipation. She did not have any bowel movement for the past 5 days. Yesterday patient was drained, and passed a very firm bowel movement. However after that episode she started passing bright red blood per rectum. She notes she has passed multiple episodes of bright red blood per rectum, ranging from a few drops, to a cup's worth at a time. No stool intermixed with the bright red blood. Blood is painless. Patient also notes since yesterday she has had nausea and vomiting and has not kept down any p.o. since yesterday. Prior to that she was tolerating p.o. She notes she had been taking her medications. She had decreased the Diamox to once daily. Patient denies any pain anywhere else. Denies any shortness of breath. Note she has a history of migraines. She takes Excedrin as needed sparingly for the migraine. She has essentially weaned herself off the Fioricet. She takes the Decadron as needed, if the Excedrin Migraine does not work      Physical Exam:   Temp:  [98.3 °F (36.8 °C)] 98.3 °F (36.8 °C)  HR:  [] 107  Resp:  [17-20] 18  BP: (143-173)/(65-86) 156/86    Gen:  Pleasant, non-tachypnic, non-dyspnic. Conversant. Heart: regular rate and rhythm, S1S2 present, no murmur, rub or gallop  Lungs: clear to ausculatation bilaterally. No wheezing, crackles, or rhonchi. No accessory muscle use or respiratory distress. Abd: soft, +tender with palpation in the right lower quadrant. , non-distended. NABS, no guarding, rebound or peritoneal signs. Extremities: no clubbing, cyanosis or edema. 2+pedal pulses bilaterally. Neuro: awake, alert. Fluent speech. Moving all 4 extremities  Skin: warm and dry: no petechiae, purpura and rash. LABS:   Results from last 7 days   Lab Units 07/19/23  0728 07/18/23  2244   WBC Thousand/uL 27.90* 27.64*   HEMOGLOBIN g/dL 15.4 16.0*   HEMATOCRIT % 45.5 47.8*   PLATELETS Thousands/uL 255 249     Results from last 7 days   Lab Units 07/19/23  0728 07/18/23  2244   POTASSIUM mmol/L 3.6 3.3*   CHLORIDE mmol/L 108 105   CO2 mmol/L 18* 19*   BUN mg/dL 18 18   CREATININE mg/dL 0.97 0.99   CALCIUM mg/dL 9.3 9.5       Hospital Data:    7/18 Ct abd/pelvis w contrast:  Stomach is mildly distended with air and fluid. No gross intraluminal mass or irregular wall thickening. Small bowel loops are normal in course and caliber without evidence for obstruction or inflammation. Terminal ileum is   unremarkable. Appendix is surgically absent. Extensive circumferential wall thickening with mucosal hyperenhancement involving the transverse, descending, and sigmoid colon noted consistent with extensive colitis. No pericolonic free air or abscess   identified.        ---------------------------------------------------------------------------------------------------------------  This note has been constructed using a voice recognition system.

## 2023-07-19 NOTE — ASSESSMENT & PLAN NOTE
Noted from 2020     Plan:  • HR well controlled on admission  • Continue PTA metoprolol XL 100mg, diltiazem 120mg   • Holding eliquis given rectal bleeding recheck Hgb

## 2023-07-19 NOTE — CONSULTS
Consultation - General Surgery  Evert Urena 70 y.o. female MRN: [id number]  Unit/Bed#: E5 -01 Encounter: [id number]        Assessment/Plan     Assessment:  70 F w/ pmh of HTN, HLD, afib, CAD, COPD, SMA thrombosis s/p stent presents with abdominal pain, hematochezia and c/f ischemic colitis. Plan:  • GI for flex sig to evaluated mucosal  • If signs of ischemia, patient consented pre-procedure for possible diagnostic lap, possible ex lap, possible bowel resection, possible ostomy,   o Risks and benefits explained in event patient is unable to consent post flex sig  • Keep NPO  • IVF- bolus and increase fluids/hr  • Trend lactate  • Continue abx  • Hold diuretics  • Please tigertext on call [facility] surgery resident or AP with any questions    History of Present Illness     HPI:  Evert Urena is a 70 y.o. female f HTN, HLD, afib, CAD, COPD, SMA thrombosis s/p stent who presents with 1 day of abdominal pain and hematochezia. Patient was admitted to the medical service overnight. GI was consulted. A CT was obtained. It showed patent vessels SMA stent in place possible RAMBO stenosis but also distribution of thickened colon in the RAMBO territory. Patient with significant leukocytosis and lactic acidosis. Has been on maintenance IV fluids but has not received boluses. Last lactic acid at approximately 11 AM was 4.7. Patient discussed with the GI team who agreed to take patient for a flex Sig. Review of Systems   Constitutional: Negative for activity change, chills and fever. HENT: Negative for congestion, ear pain and sore throat. Eyes: Negative for pain and visual disturbance. Respiratory: Negative for chest tightness and shortness of breath. Cardiovascular: Negative for chest pain and palpitations. Gastrointestinal: Positive for abdominal pain and blood in stool. Negative for abdominal distention. Endocrine: Negative for cold intolerance and heat intolerance.    Genitourinary: Negative for difficulty urinating and dysuria. Musculoskeletal: Negative for arthralgias and myalgias. Skin: Negative for color change and pallor. Neurological: Negative for dizziness and weakness. Hematological: Negative for adenopathy. Does not bruise/bleed easily. Psychiatric/Behavioral: Negative for agitation and behavioral problems. All other systems reviewed and are negative. Historical Information   Past Medical History:   Diagnosis Date   • A-fib ([address]) 03/2020   • Allergic    • Anxiety    • Arthritis    • Asthma    • Back pain     and muscle pain   • Bruises easily    • Chronic pain disorder     Interstitial pain   • Colon polyp    • Dental bridge present    • Depression    • Eczema    • GERD (gastroesophageal reflux disease)    • Heart murmur    • History of blood clots     per pt "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"   • History of pneumonia    • Hives    • Hyperlipidemia    • Hypertension    • Infertility, female    • Interstitial cystitis    • Migraine     sees neurologist   • Motion sickness    • Palpitations    • PONV (postoperative nausea and vomiting)    • Premature atrial contractions 11/9/2022   • Psychiatric disorder    • TIA (transient ischemic attack) 09/2022    per pt --"had MRI and 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"   • Urinary incontinence     wears Pads   • Venous insufficiency 8/1/2017   • Wears glasses      Past Surgical History:   Procedure Laterality Date   • COLONOSCOPY     • DILATION AND CURETTAGE OF UTERUS     • EGD     • EXPLORATORY LAPAROTOMY      for infertility   • HAND SURGERY      Hand excision of tendon cyst   • WI LAPAROSCOPIC APPENDECTOMY N/A 05/03/2022    Procedure: APPENDECTOMY LAPAROSCOPIC;  Surgeon:  Brie Prather MD;  Location: [facility];  Service: General   • WI TEAEC W/PATCH GRF CAROTID VERTB [address] Left 1/31/2023    Procedure: EXPLORATION OF LEFT CAROTID ARTERY; ABORTED ENDARTERECTOMY ARTERY CAROTID;  Surgeon: Amari Hanley DO Ricky;  Location: [facility];  Service: Vascular   • SUPERIOR MESTENTERIC ARTERY STENT     • WISDOM TOOTH EXTRACTION       Social History   Social History     Substance and Sexual Activity   Alcohol Use Never     Social History     Substance and Sexual Activity   Drug Use No     Social History     Tobacco Use   Smoking Status Former   • Packs/day: 0.50   • Years: 10.00   • Total pack years: 5.00   • Types: Cigarettes   • Quit date:    • Years since quittin.5   • Passive exposure: Never   Smokeless Tobacco Never     Family History:   Family History   Problem Relation Age of Onset   • Lung cancer Mother 61   • Brain cancer Mother 61   • Diabetes Father    • Depression Father    • Other Father         septic   • Allergies Sister    • Hashimoto's thyroiditis Sister    • Abdominal aortic aneurysm Sister    • Diabetes Sister    • No Known Problems Sister    • No Known Problems Maternal Grandmother    • No Known Problems Maternal Grandfather    • No Known Problems Paternal Grandmother    • No Known Problems Paternal Grandfather    • Hodgkin's lymphoma Brother    • No Known Problems Brother    • No Known Problems Maternal Aunt    • Diabetes Other        Meds/Allergies   all medications and allergies reviewed  Allergies   Allergen Reactions   • Ace Inhibitors Angioedema and Anaphylaxis     Anaphylaxis   • Benicar [Olmesartan] Angioedema     See Allergy note from 2008. Swollen ankles/legs   • Hydralazine Other (See Comments)     Lip swelling  Flank pain   • Other Anaphylaxis and Other (See Comments)     Other reaction(s): angioadema  Other reaction(s):  Other (See Comments)  Preservatives- itching throat closes, hi  Other reaction(s): angioadema  E Z Cat - hives throat closes itching,  Preservatives- itching throat closes, hi  Artificial sweeteners   • Valsartan Angioedema     Lips, face swollen   • Sulfa Antibiotics Hives     stuffiness,itching,hives,throat closing--per pt "sometimes able to take depending on the brand and the filler or possibly preservatives in it"   • Ampicillin Hives     Depends on brand some preservatives can react. Has recently tolerated oral amoxicillin. • Motrin [Ibuprofen] Other (See Comments)     Per pt " told not to take due to A Fib"   • Wound Dressing Adhesive Other (See Comments)     Per pt Adhesive on EKG leads caused redness       Objective   First Vitals:   Blood Pressure: 143/65 (07/18/23 2138)  Pulse: 81 (07/18/23 2138)  Temperature: 98.3 °F (36.8 °C) (07/18/23 2138)  Temp Source: Oral (07/18/23 2138)  Respirations: 20 (07/18/23 2138)  Height: 5' 4" (162.6 cm) (07/19/23 0502)  Weight - Scale: 86.2 kg (190 lb) (07/18/23 2138)  SpO2: 95 % (07/18/23 2138)    Current Vitals:   Blood Pressure: 133/70 (07/19/23 1514)  Pulse: 94 (07/19/23 1129)  Temperature: 99 °F (37.2 °C) (07/19/23 1514)  Temp Source: Oral (07/19/23 1514)  Respirations: 17 (07/19/23 1514)  Height: 5' 4" (162.6 cm) (07/19/23 0502)  Weight - Scale: 86.2 kg (190 lb 0.6 oz) (07/19/23 0502)  SpO2: 95 % (07/19/23 1129)      Intake/Output Summary (Last 24 hours) at 7/19/2023 1600  Last data filed at 7/19/2023 1250  Gross per 24 hour   Intake 1256.67 ml   Output 400 ml   Net 856.67 ml       Invasive Devices     Peripheral Intravenous Line  Duration           Peripheral IV 07/19/23 Dorsal (posterior); Right Wrist <1 day                Physical Exam  Vitals reviewed. Constitutional:       General: She is not in acute distress. Appearance: She is not toxic-appearing. HENT:      Head: Normocephalic and atraumatic. Right Ear: External ear normal.      Left Ear: External ear normal.      Nose: Nose normal. No rhinorrhea. Mouth/Throat:      Mouth: Mucous membranes are moist.      Pharynx: Oropharynx is clear. Eyes:      General: No scleral icterus. Extraocular Movements: Extraocular movements intact.       Conjunctiva/sclera: Conjunctivae normal.      Pupils: Pupils are equal, round, and reactive to light. Cardiovascular:      Rate and Rhythm: Normal rate and regular rhythm. Pulses: Normal pulses. Pulmonary:      Effort: Pulmonary effort is normal. No respiratory distress. Abdominal:      Comments: Soft, tenderness in L hemiabdomen. No guarding or rebound   Musculoskeletal:         General: No swelling or deformity. Cervical back: Normal range of motion and neck supple. Skin:     General: Skin is warm. Coloration: Skin is not jaundiced. Neurological:      General: No focal deficit present. Mental Status: She is alert and oriented to person, place, and time. Psychiatric:         Mood and Affect: Mood normal.         Behavior: Behavior normal.           Lab Results: I have personally reviewed pertinent lab results. Imaging: I have personally reviewed pertinent reports. EKG, Pathology, and Other Studies: I have personally reviewed pertinent reports.       Code Status: Level 1 - Full Code  Advance Directive and Living Will:      Power of :    POLST:

## 2023-07-19 NOTE — ED NOTES
Pt desat to 89% while at rest; pt placed on 2L nasal cannula.       James Camarillo RN  07/18/23 4018

## 2023-07-19 NOTE — ASSESSMENT & PLAN NOTE
Pt is a 69 yo female with PMH significant for Mesenteric artery thrombosis, s/p SMA stent 2021, atrial fibrillation on eliquis, migrane, and intracranial HTN who presented to the ER with abd pain, n/v and bloody stool.     · CT abd/pelvis; "Extensive circumferential wall thickening with mucosal hyperenhancement involving the transverse, descending, and sigmoid colon noted consistent with extensive colitis"  · Pt presented with leukocytosis of 27, tachycardia, and lactic acidosis  · Was started on empiric abx with ceftriaxone/flagyl  · Follows with GI, Dr. Breanna Olivia as outpt for nausea, GERD, RUQ pain, early satiety:  Had CTA 5/23 with patent SMA stent and no other significant vascular occlusions  · Due to leukocytosis, abd pain, and rising lactate: will confer with GI   · Pt's home ASA/eliquis temporarily on hold

## 2023-07-19 NOTE — ANESTHESIA PREPROCEDURE EVALUATION
Procedure:  FLEXIBLE SIGMOIDOSCOPY    Relevant Problems   CARDIO   (+) Angina pectoris (HCC)   (+) Chronic migraine w/o aura w/o status migrainosus, not intractable   (+) Essential hypertension   (+) Hypertensive heart disease with congestive heart failure (HCC)   (+) Migraines   (+) Paroxysmal atrial fibrillation (HCC)      GI/HEPATIC   (+) Gastroesophageal reflux disease without esophagitis   (+) Hepatic steatosis      NEURO/PSYCH   (+) Chronic migraine w/o aura w/o status migrainosus, not intractable   (+) Migraines      PULMONARY   (+) COPD (chronic obstructive pulmonary disease) (HCC)   (+) CAMILLE (obstructive sleep apnea)     69 yo female former smoker, with PMH HTN, HLD, pAfib (Eliquis), CAD, s/p PPM, COPD, CAMILLE, hx of SMA Thrombosis s/p BES @ [facility], obesity, hx of TIA, and asymptomatic L ICA stenosis (not amenable to intervention 2/2 anatomy) with bloody BM for flex sig to evaulate colonic viability in presence of rising lactate. Hgb 15, type and screen available. Physical Exam    Airway    Mallampati score: III  TM Distance: <3 FB       Dental   Comment: Micrognathia,     Cardiovascular      Pulmonary  Pulmonary exam normal     Other Findings        Anesthesia Plan  ASA Score- 3 Emergent    Anesthesia Type- IV sedation with anesthesia with ASA Monitors. Additional Monitors:   Airway Plan:     Comment: Second consent obtained for GETA should she need colectomy/ex lap tonight. Art line and 2nd piv discussed. Should she have an ex lap possible extended intubation post-op. Plan Factors-Exercise tolerance (METS): >4 METS. Chart reviewed. Existing labs reviewed. Patient is not a current smoker. Obstructive sleep apnea risk education given perioperatively. Induction- intravenous. Postoperative Plan- Plan for postoperative opioid use. Informed Consent- Anesthetic plan and risks discussed with patient. Name band;

## 2023-07-19 NOTE — ASSESSMENT & PLAN NOTE
Long history of migraine disorder, chronic pain, anxiety/depression, follows with [facility]   No current migraine     Plan:  • Continue with trial of Diamox for prevention   • Migraine cocktail if needed for acute attack

## 2023-07-19 NOTE — QUICK NOTE
Patient seen and examined. Leucocytosis & lactic acidosis in the setting of ischemic colitis. CT findings noted. Daphne-umbilical and LUQ/L flank tenderness on exam; abdomen otherwise soft. PLAN:  - continue IVF resuscitation, trend lactate. - IV broad spectrum abx.  - recommend urgent lower endoscopy. - may require a L colectomy w/ possible proximal diversion if ominous findings on endoscopy. - full surgery consultation note to follow. Suzi Acharya MD  PGY-5, Surgery.

## 2023-07-20 PROBLEM — G89.18 ACUTE POSTOPERATIVE PAIN: Status: ACTIVE | Noted: 2023-07-20

## 2023-07-20 PROBLEM — E87.20 LACTIC ACIDOSIS: Status: RESOLVED | Noted: 2023-07-19 | Resolved: 2023-07-20

## 2023-07-20 LAB
ANION GAP SERPL CALCULATED.3IONS-SCNC: 10 MMOL/L
ANION GAP SERPL CALCULATED.3IONS-SCNC: 9 MMOL/L
BASOPHILS # BLD MANUAL: 0 THOUSAND/UL (ref 0–0.1)
BASOPHILS NFR MAR MANUAL: 0 % (ref 0–1)
BUN SERPL-MCNC: 13 MG/DL (ref 5–25)
BUN SERPL-MCNC: 13 MG/DL (ref 5–25)
CALCIUM SERPL-MCNC: 7.5 MG/DL (ref 8.4–10.2)
CALCIUM SERPL-MCNC: 7.6 MG/DL (ref 8.4–10.2)
CHLORIDE SERPL-SCNC: 111 MMOL/L (ref 96–108)
CHLORIDE SERPL-SCNC: 112 MMOL/L (ref 96–108)
CO2 SERPL-SCNC: 18 MMOL/L (ref 21–32)
CO2 SERPL-SCNC: 18 MMOL/L (ref 21–32)
CREAT SERPL-MCNC: 0.7 MG/DL (ref 0.6–1.3)
CREAT SERPL-MCNC: 0.7 MG/DL (ref 0.6–1.3)
EOSINOPHIL # BLD MANUAL: 0 THOUSAND/UL (ref 0–0.4)
EOSINOPHIL NFR BLD MANUAL: 0 % (ref 0–6)
ERYTHROCYTE [DISTWIDTH] IN BLOOD BY AUTOMATED COUNT: 13.5 % (ref 11.6–15.1)
ERYTHROCYTE [DISTWIDTH] IN BLOOD BY AUTOMATED COUNT: 13.5 % (ref 11.6–15.1)
GFR SERPL CREATININE-BSD FRML MDRD: 91 ML/MIN/1.73SQ M
GFR SERPL CREATININE-BSD FRML MDRD: 91 ML/MIN/1.73SQ M
GLUCOSE SERPL-MCNC: 171 MG/DL (ref 65–140)
GLUCOSE SERPL-MCNC: 177 MG/DL (ref 65–140)
HCT VFR BLD AUTO: 37.4 % (ref 34.8–46.1)
HCT VFR BLD AUTO: 39.4 % (ref 34.8–46.1)
HGB BLD-MCNC: 12.3 G/DL (ref 11.5–15.4)
HGB BLD-MCNC: 13.1 G/DL (ref 11.5–15.4)
LACTATE SERPL-SCNC: 1.7 MMOL/L (ref 0.5–2)
LACTATE SERPL-SCNC: 2.8 MMOL/L (ref 0.5–2)
LG PLATELETS BLD QL SMEAR: PRESENT
LYMPHOCYTES # BLD AUTO: 0.63 THOUSAND/UL (ref 0.6–4.47)
LYMPHOCYTES # BLD AUTO: 4 % (ref 14–44)
MACROCYTES BLD QL AUTO: PRESENT
MAGNESIUM SERPL-MCNC: 1.7 MG/DL (ref 1.9–2.7)
MCH RBC QN AUTO: 29.9 PG (ref 26.8–34.3)
MCH RBC QN AUTO: 29.9 PG (ref 26.8–34.3)
MCHC RBC AUTO-ENTMCNC: 32.9 G/DL (ref 31.4–37.4)
MCHC RBC AUTO-ENTMCNC: 33.2 G/DL (ref 31.4–37.4)
MCV RBC AUTO: 90 FL (ref 82–98)
MCV RBC AUTO: 91 FL (ref 82–98)
METAMYELOCYTES NFR BLD MANUAL: 1 % (ref 0–1)
MONOCYTES # BLD AUTO: 0 THOUSAND/UL (ref 0–1.22)
MONOCYTES NFR BLD: 0 % (ref 4–12)
NEUTROPHILS # BLD MANUAL: 14.94 THOUSAND/UL (ref 1.85–7.62)
NEUTS BAND NFR BLD MANUAL: 31 % (ref 0–8)
NEUTS SEG NFR BLD AUTO: 64 % (ref 43–75)
PHOSPHATE SERPL-MCNC: 2.1 MG/DL (ref 2.3–4.1)
PLATELET # BLD AUTO: 177 THOUSANDS/UL (ref 149–390)
PLATELET # BLD AUTO: 220 THOUSANDS/UL (ref 149–390)
PLATELET BLD QL SMEAR: ADEQUATE
PMV BLD AUTO: 9.1 FL (ref 8.9–12.7)
PMV BLD AUTO: 9.3 FL (ref 8.9–12.7)
POLYCHROMASIA BLD QL SMEAR: PRESENT
POTASSIUM SERPL-SCNC: 3.3 MMOL/L (ref 3.5–5.3)
POTASSIUM SERPL-SCNC: 3.6 MMOL/L (ref 3.5–5.3)
RBC # BLD AUTO: 4.12 MILLION/UL (ref 3.81–5.12)
RBC # BLD AUTO: 4.38 MILLION/UL (ref 3.81–5.12)
SODIUM SERPL-SCNC: 139 MMOL/L (ref 135–147)
SODIUM SERPL-SCNC: 139 MMOL/L (ref 135–147)
WBC # BLD AUTO: 14.85 THOUSAND/UL (ref 4.31–10.16)
WBC # BLD AUTO: 15.73 THOUSAND/UL (ref 4.31–10.16)

## 2023-07-20 PROCEDURE — 83605 ASSAY OF LACTIC ACID: CPT | Performed by: NURSE PRACTITIONER

## 2023-07-20 PROCEDURE — 99024 POSTOP FOLLOW-UP VISIT: CPT | Performed by: SURGERY

## 2023-07-20 PROCEDURE — 80048 BASIC METABOLIC PNL TOTAL CA: CPT | Performed by: INTERNAL MEDICINE

## 2023-07-20 PROCEDURE — 85027 COMPLETE CBC AUTOMATED: CPT | Performed by: INTERNAL MEDICINE

## 2023-07-20 PROCEDURE — 99291 CRITICAL CARE FIRST HOUR: CPT | Performed by: NURSE PRACTITIONER

## 2023-07-20 PROCEDURE — 85007 BL SMEAR W/DIFF WBC COUNT: CPT | Performed by: INTERNAL MEDICINE

## 2023-07-20 PROCEDURE — 99231 SBSQ HOSP IP/OBS SF/LOW 25: CPT | Performed by: INTERNAL MEDICINE

## 2023-07-20 RX ORDER — METOPROLOL TARTRATE 5 MG/5ML
5 INJECTION INTRAVENOUS EVERY 6 HOURS PRN
Status: DISCONTINUED | OUTPATIENT
Start: 2023-07-20 | End: 2023-07-21

## 2023-07-20 RX ORDER — HYDROMORPHONE HCL/PF 1 MG/ML
0.5 SYRINGE (ML) INJECTION EVERY 4 HOURS PRN
Status: DISCONTINUED | OUTPATIENT
Start: 2023-07-20 | End: 2023-07-21

## 2023-07-20 RX ORDER — METHOCARBAMOL 500 MG/1
500 TABLET, FILM COATED ORAL EVERY 6 HOURS SCHEDULED
Status: DISCONTINUED | OUTPATIENT
Start: 2023-07-20 | End: 2023-07-28 | Stop reason: HOSPADM

## 2023-07-20 RX ORDER — OXYCODONE HYDROCHLORIDE 5 MG/1
5 TABLET ORAL EVERY 4 HOURS PRN
Status: DISCONTINUED | OUTPATIENT
Start: 2023-07-20 | End: 2023-07-21

## 2023-07-20 RX ORDER — POTASSIUM CHLORIDE 14.9 MG/ML
20 INJECTION INTRAVENOUS
Status: DISPENSED | OUTPATIENT
Start: 2023-07-20 | End: 2023-07-20

## 2023-07-20 RX ORDER — DILTIAZEM HYDROCHLORIDE 5 MG/ML
15 INJECTION INTRAVENOUS 2 TIMES DAILY
Status: DISCONTINUED | OUTPATIENT
Start: 2023-07-20 | End: 2023-07-21

## 2023-07-20 RX ORDER — SUCRALFATE 1 G/1
1 TABLET ORAL
Status: DISCONTINUED | OUTPATIENT
Start: 2023-07-20 | End: 2023-07-28 | Stop reason: HOSPADM

## 2023-07-20 RX ORDER — SODIUM CHLORIDE, SODIUM GLUCONATE, SODIUM ACETATE, POTASSIUM CHLORIDE, MAGNESIUM CHLORIDE, SODIUM PHOSPHATE, DIBASIC, AND POTASSIUM PHOSPHATE .53; .5; .37; .037; .03; .012; .00082 G/100ML; G/100ML; G/100ML; G/100ML; G/100ML; G/100ML; G/100ML
500 INJECTION, SOLUTION INTRAVENOUS ONCE
Status: COMPLETED | OUTPATIENT
Start: 2023-07-20 | End: 2023-07-20

## 2023-07-20 RX ORDER — DILTIAZEM HYDROCHLORIDE 5 MG/ML
15 INJECTION INTRAVENOUS 2 TIMES DAILY PRN
Status: DISCONTINUED | OUTPATIENT
Start: 2023-07-20 | End: 2023-07-20

## 2023-07-20 RX ORDER — ACETAMINOPHEN 325 MG/1
650 TABLET ORAL EVERY 6 HOURS SCHEDULED
Status: DISCONTINUED | OUTPATIENT
Start: 2023-07-20 | End: 2023-07-28 | Stop reason: HOSPADM

## 2023-07-20 RX ORDER — POTASSIUM CHLORIDE 14.9 MG/ML
20 INJECTION INTRAVENOUS
Status: DISCONTINUED | OUTPATIENT
Start: 2023-07-20 | End: 2023-07-20

## 2023-07-20 RX ORDER — METOPROLOL TARTRATE 5 MG/5ML
10 INJECTION INTRAVENOUS EVERY 6 HOURS
Status: DISCONTINUED | OUTPATIENT
Start: 2023-07-20 | End: 2023-07-20

## 2023-07-20 RX ORDER — METOPROLOL TARTRATE 5 MG/5ML
5 INJECTION INTRAVENOUS 4 TIMES DAILY
Status: DISCONTINUED | OUTPATIENT
Start: 2023-07-20 | End: 2023-07-20

## 2023-07-20 RX ORDER — MAGNESIUM SULFATE HEPTAHYDRATE 40 MG/ML
2 INJECTION, SOLUTION INTRAVENOUS ONCE
Status: COMPLETED | OUTPATIENT
Start: 2023-07-20 | End: 2023-07-20

## 2023-07-20 RX ORDER — METOPROLOL TARTRATE 5 MG/5ML
10 INJECTION INTRAVENOUS EVERY 6 HOURS
Status: DISCONTINUED | OUTPATIENT
Start: 2023-07-20 | End: 2023-07-21

## 2023-07-20 RX ADMIN — CHLORHEXIDINE GLUCONATE 15 ML: 1.2 RINSE ORAL at 11:53

## 2023-07-20 RX ADMIN — MAGNESIUM SULFATE IN WATER 2 G: 40 INJECTION, SOLUTION INTRAVENOUS at 05:22

## 2023-07-20 RX ADMIN — METOROPROLOL TARTRATE 10 MG: 5 INJECTION, SOLUTION INTRAVENOUS at 23:28

## 2023-07-20 RX ADMIN — CHLORHEXIDINE GLUCONATE 15 ML: 1.2 RINSE ORAL at 20:44

## 2023-07-20 RX ADMIN — SUCRALFATE 1 G: 1 TABLET ORAL at 23:15

## 2023-07-20 RX ADMIN — ACETAMINOPHEN 325MG 650 MG: 325 TABLET ORAL at 18:06

## 2023-07-20 RX ADMIN — OXYCODONE HYDROCHLORIDE 5 MG: 5 TABLET ORAL at 20:44

## 2023-07-20 RX ADMIN — POTASSIUM CHLORIDE 20 MEQ: 14.9 INJECTION, SOLUTION INTRAVENOUS at 09:17

## 2023-07-20 RX ADMIN — METHOCARBAMOL 500 MG: 500 TABLET ORAL at 18:06

## 2023-07-20 RX ADMIN — METOROPROLOL TARTRATE 10 MG: 5 INJECTION, SOLUTION INTRAVENOUS at 18:07

## 2023-07-20 RX ADMIN — POTASSIUM CHLORIDE 20 MEQ: 14.9 INJECTION, SOLUTION INTRAVENOUS at 13:13

## 2023-07-20 RX ADMIN — METRONIDAZOLE 500 MG: 500 INJECTION, SOLUTION INTRAVENOUS at 06:05

## 2023-07-20 RX ADMIN — SODIUM CHLORIDE, SODIUM GLUCONATE, SODIUM ACETATE, POTASSIUM CHLORIDE, MAGNESIUM CHLORIDE, SODIUM PHOSPHATE, DIBASIC, AND POTASSIUM PHOSPHATE 500 ML: .53; .5; .37; .037; .03; .012; .00082 INJECTION, SOLUTION INTRAVENOUS at 15:32

## 2023-07-20 RX ADMIN — HEPARIN SODIUM 5000 UNITS: 5000 INJECTION INTRAVENOUS; SUBCUTANEOUS at 21:01

## 2023-07-20 RX ADMIN — METOROPROLOL TARTRATE 5 MG: 5 INJECTION, SOLUTION INTRAVENOUS at 15:32

## 2023-07-20 RX ADMIN — MORPHINE SULFATE 2 MG: 2 INJECTION, SOLUTION INTRAMUSCULAR; INTRAVENOUS at 19:45

## 2023-07-20 RX ADMIN — DILTIAZEM HYDROCHLORIDE 15 MG: 5 INJECTION INTRAVENOUS at 14:17

## 2023-07-20 RX ADMIN — HEPARIN SODIUM 5000 UNITS: 5000 INJECTION INTRAVENOUS; SUBCUTANEOUS at 14:29

## 2023-07-20 RX ADMIN — FAMOTIDINE 20 MG: 10 INJECTION INTRAVENOUS at 21:01

## 2023-07-20 RX ADMIN — HEPARIN SODIUM 5000 UNITS: 5000 INJECTION INTRAVENOUS; SUBCUTANEOUS at 05:17

## 2023-07-20 RX ADMIN — DILTIAZEM HYDROCHLORIDE 15 MG: 5 INJECTION INTRAVENOUS at 20:53

## 2023-07-20 RX ADMIN — ONDANSETRON 4 MG: 2 INJECTION INTRAMUSCULAR; INTRAVENOUS at 20:51

## 2023-07-20 RX ADMIN — MORPHINE SULFATE 2 MG: 2 INJECTION, SOLUTION INTRAMUSCULAR; INTRAVENOUS at 16:54

## 2023-07-20 RX ADMIN — CEFTRIAXONE 1000 MG: 1 INJECTION, SOLUTION INTRAVENOUS at 07:27

## 2023-07-20 RX ADMIN — ACETAMINOPHEN 325MG 650 MG: 325 TABLET ORAL at 23:17

## 2023-07-20 RX ADMIN — METHOCARBAMOL 500 MG: 500 TABLET ORAL at 23:18

## 2023-07-20 RX ADMIN — SUCRALFATE 1 G: 1 TABLET ORAL at 18:07

## 2023-07-20 RX ADMIN — SODIUM CHLORIDE, SODIUM GLUCONATE, SODIUM ACETATE, POTASSIUM CHLORIDE, MAGNESIUM CHLORIDE, SODIUM PHOSPHATE, DIBASIC, AND POTASSIUM PHOSPHATE 125 ML/HR: .53; .5; .37; .037; .03; .012; .00082 INJECTION, SOLUTION INTRAVENOUS at 18:45

## 2023-07-20 RX ADMIN — METOROPROLOL TARTRATE 5 MG: 5 INJECTION, SOLUTION INTRAVENOUS at 11:48

## 2023-07-20 RX ADMIN — POTASSIUM CHLORIDE 20 MEQ: 14.9 INJECTION, SOLUTION INTRAVENOUS at 05:22

## 2023-07-20 NOTE — PLAN OF CARE
Problem: PAIN - ADULT  Goal: Verbalizes/displays adequate comfort level or baseline comfort level  Description: Interventions:  - Encourage patient to monitor pain and request assistance  - Assess pain using appropriate pain scale  - Administer analgesics based on type and severity of pain and evaluate response  - Implement non-pharmacological measures as appropriate and evaluate response  - Consider cultural and social influences on pain and pain management  - Notify physician/advanced practitioner if interventions unsuccessful or patient reports new pain  Outcome: Progressing     Problem: INFECTION - ADULT  Goal: Absence or prevention of progression during hospitalization  Description: INTERVENTIONS:  - Assess and monitor for signs and symptoms of infection  - Monitor lab/diagnostic results  - Monitor all insertion sites, i.e. indwelling lines, tubes, and drains  - Monitor endotracheal if appropriate and nasal secretions for changes in amount and color  - [place] appropriate cooling/warming therapies per order  - Administer medications as ordered  - Instruct and encourage patient and family to use good hand hygiene technique  - Identify and instruct in appropriate isolation precautions for identified infection/condition  Outcome: Progressing  Goal: Absence of fever/infection during neutropenic period  Description: INTERVENTIONS:  - Monitor WBC    Outcome: Progressing     Problem: SAFETY ADULT  Goal: Patient will remain free of falls  Description: INTERVENTIONS:  - Educate patient/family on patient safety including physical limitations  - Instruct patient to call for assistance with activity   - Consult OT/PT to assist with strengthening/mobility   - Keep Call bell within reach  - Keep bed low and locked with side rails adjusted as appropriate  - Keep care items and personal belongings within reach  - Initiate and maintain comfort rounds  - Make Fall Risk Sign visible to staff  - Apply yellow socks and bracelet for high fall risk patients  - Consider moving patient to room near nurses station  Outcome: Progressing  Goal: Maintain or return to baseline ADL function  Description: INTERVENTIONS:  -  Assess patient's ability to carry out ADLs; assess patient's baseline for ADL function and identify physical deficits which impact ability to perform ADLs (bathing, care of mouth/teeth, toileting, grooming, dressing, etc.)  - Assess/evaluate cause of self-care deficits   - Assess range of motion  - Assess patient's mobility; develop plan if impaired  - Assess patient's need for assistive devices and provide as appropriate  - Encourage maximum independence but intervene and supervise when necessary  - Involve family in performance of ADLs  - Assess for home care needs following discharge   - Consider OT consult to assist with ADL evaluation and planning for discharge  - Provide patient education as appropriate  Outcome: Progressing  Goal: Maintains/Returns to pre admission functional level  Description: INTERVENTIONS:  - Perform BMAT or MOVE assessment daily.   - Set and communicate daily mobility goal to care team and patient/family/caregiver.    - Collaborate with rehabilitation services on mobility goals if consulted  - Out of bed for toileting  - Record patient progress and toleration of activity level   Outcome: Progressing     Problem: DISCHARGE PLANNING  Goal: Discharge to home or other facility with appropriate resources  Description: INTERVENTIONS:  - Identify barriers to discharge w/patient and caregiver  - Arrange for needed discharge resources and transportation as appropriate  - Identify discharge learning needs (meds, wound care, etc.)  - Arrange for interpretive services to assist at discharge as needed  - Refer to Case Management Department for coordinating discharge planning if the patient needs post-hospital services based on physician/advanced practitioner order or complex needs related to functional status, cognitive ability, or social support system  Outcome: Progressing     Problem: Knowledge Deficit  Goal: Patient/family/caregiver demonstrates understanding of disease process, treatment plan, medications, and discharge instructions  Description: Complete learning assessment and assess knowledge base.   Interventions:  - Provide teaching at level of understanding  - Provide teaching via preferred learning methods  Outcome: Progressing

## 2023-07-20 NOTE — ANESTHESIA POSTPROCEDURE EVALUATION
Post-Op Assessment Note    CV Status:  Stable    Pain management: adequate     Mental Status:  Alert and awake   Hydration Status:  Euvolemic   PONV Controlled:  Controlled   Airway Patency:  Patent      Post Op Vitals Reviewed: Yes      Staff: Anesthesiologist         No notable events documented.     BP      Temp      Pulse     Resp      SpO2

## 2023-07-20 NOTE — UTILIZATION REVIEW
Initial Clinical Review    ED TREATMENT   TIME  7/18  @   2137    Admission: Date/Time/Statement:   Admission Orders (From admission, onward)     Ordered        07/19/23 0307  INPATIENT ADMISSION  Once                      Orders Placed This Encounter   Procedures   • INPATIENT ADMISSION     Standing Status:   Standing     Number of Occurrences:   1     Order Specific Question:   Level of Care     Answer:   Med Surg [16]     Order Specific Question:   Estimated length of stay     Answer:   More than 2 Midnights     Order Specific Question:   Certification     Answer:   I certify that inpatient services are medically necessary for this patient for a duration of greater than two midnights. See H&P and MD Progress Notes for additional information about the patient's course of treatment. ED Arrival Information     Expected   -    Arrival   7/18/2023 21:37    Acuity   Urgent            Means of arrival   Ambulance    Escorted by   [facility] ([address] [place] [address])    Service   Critical Care/ICU    Admission type   Emergency            Arrival complaint   abdominal pain, rectal bleeding           Chief Complaint   Patient presents with   • GI Bleeding     Reports constipation for several days and then GI/rectal bleeding today after BM. C/o lower ab pain, n/v. Takes Eliquis. Initial Presentation: 70 y.o. female presents to ED via  EMS from home with rectal bleeding, constipation for awhile and straining with bowel movements. Has noticed a lot of  Bright red blood and clots. Has diffuse abdominal pain  For   Past few days  With nausea and vomiting. PMH  Is Left carotid stenosis,   COPD,  Afib,  Migraine, idiopathic intracranial hypertension,  Anxiety, GERD, chronic back pain and HTN. Ct scan shows extensive colitis  W/O free air or abscess. Lactate increasing and concern for ischemic colitis.    Admit  Ip with   Colitis and plan is GI consult, IVF,   AYANNA, monitor labs,  Hold  Eliquis, urgent  Vascular consult,   And continue home meds. GI consult  ( 7/19)    Urgent  Endoscopy recommended   D/T concern for colonic  Ischemia. Vascular consult  ( 7/19)    No  Vascular intervention  at present. Ischemia colitis in setting of  Possible  RAMBO stenosis, H/O SMA stent,  Widely patent. Continue  IVF and AYANNA. Surgery consult   Plan OR, appears to be ischemia in watershed area of the  RAMBO. SURGERY DATE: 7/19/2023    Procedure(s):  LAPAROTOMY EXPLORATORY W/ BOWEL RESECTION     LAPAROSCOPY DIAGNOSTIC. LYSIS OF ADHESIONS. LAPARASCOPY TAKE TAKEDOWN OF LEFT COLON.      EXPLORATORY LAPAROSCOPY LYSIS OF ADHESIONS. RESECTION TRANSVERSE COLON , SPLENIC FLEXURE, . AND DESCENDING COLON . TAKE DOWN OF SPLENIC FLEXURE. SIGMOIDOSCOPY. MOBILIZATION OF RIGHT COLON. PRIMARY ANASTOMOSIS EEA 29. TAP BLOCK    Operative Findings:  Ischemic transverse colon and its entire length,  Ischemic splenic flexure  Ischemic descending colon to the level of the sigmoid:  Adhesions  Central obesity     Should be noted the patient underwent a modified Cattell  Brasch maneuver mobilizing the right colon so that the cecum is in the right upper quadrant without torsion. The patient does not have an appendix that she underwent a previous appendectomy. Was to provide adequate length for the right colon to sigmoid colon anastomosis.     Decision was made to perform an anastomosis secondary to the patient's very large abdominal wall thickness which would preclude a safe colostomy. Or even ileostomy. Date:    7/20        Day 3: Has surpassed a 2nd midnight with active treatments and services, which include   POD   #  1  . Remains  NPO with NGT. Continue IVF. ICU  Post op. No bowel function thus far. Continue PCA. Monitor labs.         ED Triage Vitals   Temperature Pulse Respirations Blood Pressure SpO2   07/18/23 2138 07/18/23 2138 07/18/23 2138 07/18/23 2138 07/18/23 2138   98.3 °F (36.8 °C) 81 20 143/65 95 %      Temp Source Heart Rate Source Patient Position - Orthostatic VS BP Location FiO2 (%)   07/18/23 2138 07/19/23 0003 07/19/23 0003 07/19/23 0003 --   Oral Monitor Lying Right arm       Pain Score       07/18/23 2138       10 - Worst Possible Pain          Wt Readings from Last 1 Encounters:   07/20/23 86.2 kg (190 lb 0.6 oz)     Additional Vital Signs:   20/23 0100 -- 74 15 144/62 86 164/68 102 mmHg 97 % 36 -- 4 L/min Nasal cannula -- --   07/20/23 0000 -- 74 18 133/56 75 154/76 106 mmHg 96 % -- -- -- -- -- --   07/19/23 2301 97.7 °F (36.5 °C) 76 18 123/64 86 174/80 114 mmHg 93 % -- -- -- -- -- --   07/19/23 2300 -- 76 19 139/64 92 170/80 112 mmHg 94 % -- -- -- -- -- --   07/19/23 2245 -- 76 22 139/87 117 -- -- 92 % -- -- -- -- -- --   07/19/23 2240 -- 76 16 -- -- -- -- 92 % -- -- -- -- -- --   07/19/23 2230 -- 74 18 117/41 Abnormal  51 Abnormal  -- -- 94 % -- -- -- -- -- --   07/19/23 2215 97.7 °F (36.5 °C) 74 16 117/60 69 -- -- 93 % 32 -- 3 L/min Nasal cannula WDL Lying   07/19/23 1659 -- 82 16 170/80 -- -- -- 94 % -- -- -- None (Room air) -- --   07/19/23 1644 -- 81 16 170/61 -- -- -- 99 % -- 12 L/min -- Simple mask -- --   07/19/23 15:14:08 99 °F (37.2 °C) -- 17 133/70 91 -- -- -- -- -- -- None (Room air) -- Sitting   07/19/23 11:29:09 99.6 °F (37.6 °C) 94 19 146/72 97 -- -- 95 % 28 -- 2 L/min Nasal cannula -- Lying   07/19/23 0952 -- -- -- -- -- -- -- 96 % -- -- -- -- -- --   07/19/23 0924 -- 103 -- 141/67 -- -- -- -- -- -- -- -- -- --   07/19/23 0600 -- 107 Abnormal  18 156/86 112 -- -- 94 % -- -- -- None (Room air) -- Lying   07/19/23 0502 98.3 °F (36.8 °C) 100 20 150/76 104 -- -- 94 % 404 -- 96 L/min None (Room air) -- Lying   07/19/23 0300 -- 89 18 161/70 100 -- -- 93 % -- -- -- None (Room air) -- Lying   07/19/23 0003 -- 82 18 173/75 Abnormal  108 -- -- 99 % 28 -- 2 L/min Nasal cannula -- Lying   07/18/23 2200 -- 79 17 154/73 105 -- -- 95 % -- -- -- None (Room air) -- --   07/18/23 2138 98.3 °F (36.8 °C) 81 20 143/65 -- -- -- 95 % -- -- -- None (Room air) --        Pertinent Labs/Diagnostic Test Results:   CTA abdomen pelvis w wo contrast   Final Result by Victoriano Garcia MD (07/19 1409)   Addendum (preliminary) 1 of 1 by Victoriano Garcia MD (07/19 1409)   ADDENDUM:      The major results were discussed with the ordering clinician, Dr. Rossi Woodruff,    7/19/2023 at [phone]. Final      No significant interval change in the appearance of the major arteries since at least 5/25/2023. No aortic or major arterial occlusion. Proximal SMA stent, patent. Cannot exclude some degree of stenosis at the origin of the RAMBO, however not significantly    changed. Redemonstrated is colitis somewhat matching the RAMBO distribution but sparing sigmoid colon and rectum. No pneumatosis, abscess, perforation. Incidental findings, as per the body of the report. Workstation performed: [id number]         CT abdomen pelvis with contrast   Final Result by Geeta Hoffman MD (07/19 0148)      Extensive colitis as described above without pericolonic free air or abscess. Recommend follow-up colonoscopy when clinically appropriate.          Workstation performed: [id number]               Results from last 7 days   Lab Units 07/20/23 0416 07/19/23 2347 07/19/23 0728 07/18/23  2244   WBC Thousand/uL 15.73* 14.85* 27.90* 27.64*   HEMOGLOBIN g/dL 12.3 13.1 15.4 16.0*   HEMATOCRIT % 37.4 39.4 45.5 47.8*   PLATELETS Thousands/uL 177 220 255 249   NEUTROS ABS Thousands/µL  --   --   --  24.56*   BANDS PCT % 31*  --   --   --          Results from last 7 days   Lab Units 07/20/23 0416 07/19/23 2347 07/19/23 0728 07/18/23  2244   SODIUM mmol/L 139 139 139 138   POTASSIUM mmol/L 3.3* 3.6 3.6 3.3*   CHLORIDE mmol/L 112* 111* 108 105   CO2 mmol/L 18* 18* 18* 19*   ANION GAP mmol/L 9 10 13 14   BUN mg/dL 13 13 18 18   CREATININE mg/dL 0.70 0.70 0.97 0.99   EGFR ml/min/1.73sq m 91 91 61 59   CALCIUM mg/dL 7.6* 7.5* 9.3 9.5   MAGNESIUM mg/dL  --  1.7*  --   -- PHOSPHORUS mg/dL  --  2.1*  --   --      Results from last 7 days   Lab Units 07/18/23  2244   AST U/L 17   ALT U/L 14   ALK PHOS U/L 118*   TOTAL PROTEIN g/dL 7.7   ALBUMIN g/dL 4.7   TOTAL BILIRUBIN mg/dL 0.47         Results from last 7 days   Lab Units 07/20/23  0416 07/19/23  2347 07/19/23  0728 07/18/23  2244   GLUCOSE RANDOM mg/dL 171* 177* 185* 149*          Results from last 7 days   Lab Units 07/19/23  2347   PH ART  7.327*   PCO2 ART mm Hg 31.6*   PO2 ART mm Hg 84.2   HCO3 ART mmol/L 16.2*   BASE EXC ART mmol/L -8.6   O2 CONTENT ART mL/dL 18.3   O2 HGB, ARTERIAL % 93.8*   ABG SOURCE  Line, Arterial                         Results from last 7 days   Lab Units 07/18/23  2244   PROTIME seconds 14.4   INR  1.11   PTT seconds 33         Results from last 7 days   Lab Units 07/19/23  0728   PROCALCITONIN ng/ml 0.42*     Results from last 7 days   Lab Units 07/20/23  0318 07/19/23  2347 07/19/23  2011 07/19/23  1512 07/19/23  1054 07/19/23  0728 07/19/23  0120   LACTIC ACID mmol/L 1.7 2.8* 1.4 3.0* 4.7* 4.0* 2.6*                   Results from last 7 days   Lab Units 07/18/23  2243   CLARITY UA  Clear   COLOR UA  Dark Yellow   SPEC GRAV UA  1.022   PH UA  5.5   GLUCOSE UA mg/dl Negative   KETONES UA mg/dl Negative   BLOOD UA  Negative   PROTEIN UA mg/dl 30 (1+)*   NITRITE UA  Negative   BILIRUBIN UA  Negative   UROBILINOGEN UA (BE) mg/dl <2.0   LEUKOCYTES UA  Negative   WBC UA /hpf 1-2   RBC UA /hpf 1-2   BACTERIA UA /hpf Occasional   EPITHELIAL CELLS WET PREP /hpf Occasional   MUCUS THREADS  Innumerable*         ED Treatment:   Medication Administration from 07/18/2023 2137 to 07/19/2023 1124       Date/Time Order Dose Route Action Comments     07/19/2023 0041 EDT sodium chloride 0.9 % bolus 1,000 mL 0 mL Intravenous Stopped --     07/18/2023 2245 EDT sodium chloride 0.9 % bolus 1,000 mL 1,000 mL Intravenous New Bag --     07/18/2023 2246 EDT ondansetron (ZOFRAN) injection 4 mg 4 mg Intravenous Given -- 07/18/2023 2333 EDT iohexol (OMNIPAQUE) 350 MG/ML injection (SINGLE-DOSE) 100 mL 100 mL Intravenous Given --     07/19/2023 0036 EDT ondansetron (ZOFRAN) injection 4 mg 4 mg Intravenous Given --     07/19/2023 0038 EDT morphine injection 4 mg 4 mg Intravenous Given --     07/19/2023 0500 EDT morphine injection 4 mg 4 mg Intravenous Given --     07/19/2023 0924 EDT diltiazem (CARDIZEM SR) 12 hr capsule 120 mg 120 mg Oral Given --     07/19/2023 0931 EDT furosemide (LASIX) tablet 40 mg -- Oral Canceled Entry --     07/19/2023 0924 EDT metoprolol succinate (TOPROL-XL) 24 hr tablet 100 mg 100 mg Oral Given --     07/19/2023 0931 EDT spironolactone (ALDACTONE) tablet 25 mg -- Oral Canceled Entry --     07/19/2023 0947 EDT sodium chloride 0.9 % infusion 0 mL/hr Intravenous Stopped --     07/19/2023 0743 EDT sodium chloride 0.9 % infusion 100 mL/hr Intravenous New Bag --     07/19/2023 0729 EDT ondansetron (ZOFRAN) injection 4 mg 4 mg Intravenous Given --     07/19/2023 0913 EDT cefTRIAXone (ROCEPHIN) IVPB (premix in dextrose) 1,000 mg 50 mL 0 mg Intravenous Stopped --     07/19/2023 0732 EDT cefTRIAXone (ROCEPHIN) IVPB (premix in dextrose) 1,000 mg 50 mL 1,000 mg Intravenous New Bag --     07/19/2023 0946 EDT metroNIDAZOLE (FLAGYL) IVPB (premix) 500 mg 100 mL 500 mg Intravenous New Bag awaiting completion of prior abx     07/19/2023 0942 EDT morphine injection 4 mg 4 mg Intravenous Given --     07/19/2023 0947 EDT sodium chloride 0.9 % infusion 125 mL/hr Intravenous New Bag --          Present on Admission:  • Colitis  • COPD (chronic obstructive pulmonary disease) (HCC)  • Paroxysmal atrial fibrillation (HCC)  • Essential hypertension  • Colonic ischemia ([facility])      Admitting Diagnosis: Lactic acidosis [E87.20]  Colitis [K52.9]  GI bleeding [K92.2]  GI bleed [K92.2]  Age/Sex: 70 y.o. female  Admission Orders:  Scheduled Medications:  cefTRIAXone, 1,000 mg, Intravenous, Q24H  chlorhexidine, 15 mL, Mouth/Throat, Q12H [facility]  diltiazem, 120 mg, Oral, Daily  famotidine, 20 mg, Intravenous, HS  heparin (porcine), 5,000 Units, Subcutaneous, Q8H BONIFACIO  metoprolol, 5 mg, Intravenous, 4x Daily  norepinephrine 4 mg in 0.9% sodium chloride 250 mL, , ,   potassium chloride, 20 mEq, Intravenous, Q2H      Continuous IV Infusions:  HYDROmorphone, , Intravenous, Continuous  multi-electrolyte, 125 mL/hr, Intravenous, Continuous      PRN Meds:  aluminum-magnesium hydroxide-simethicone, 30 mL, Oral, Q6H PRN  naloxone, 0.04 mg, Intravenous, Q3 min PRN  norepinephrine 4 mg in 0.9% sodium chloride 250 mL, , ,   ondansetron, 4 mg, Intravenous, Q6H PRN  polyethylene glycol, 17 g, Oral, Daily PRN        IP CONSULT TO GASTROENTEROLOGY  IP CONSULT TO VASCULAR SURGERY  IP CONSULT TO ACUTE CARE SURGERY  IP CONSULT TO CASE MANAGEMENT    Network Utilization Review Department  ATTENTION: Please call with any questions or concerns to [phone] and carefully listen to the prompts so that you are directed to the right person. All voicemails are confidential.  Elvia Simmons all requests for admission clinical reviews, approved or denied determinations and any other requests to dedicated fax number below belonging to the campus where the patient is receiving treatment.  List of dedicated fax numbers for the Facilities:  [place] DENIALS (Administrative/Medical Necessity) [phone] [address] (Maternity/NICU/Pediatrics) [phone]   [address] [place] [address] Drive [phone]   [facility] [phone]   [address] [place] [address] N [phone]   [address] [place] [address] [facility]  [address] [phone]

## 2023-07-20 NOTE — ASSESSMENT & PLAN NOTE
• Admitted September 2022 on stroke pathway. MRI was negative. Believed her symptoms were attributed to migraine and patient was discharged on aspirin and Eliquis. • Ultimately patient was diagnosed with left ICA stenosis 80% under went unsuccessful CEA with subsequent left facial numbness and left facial asymmetry. • She was again admitted February 2023 with left facial droop and bilateral upper extremity paresthesias with negative stroke work-up that admission as well. • Carotid stenosis medically managed.

## 2023-07-20 NOTE — ASSESSMENT & PLAN NOTE
· Was on Eliquis preoperatively, received Kcentra for reversal  · Rate control with diltiazem and metoprolol  · Currently rate controlled on telemetry  · Resume AC once okay with surgery  · Resume p.o. rate control meds when okay with surgery

## 2023-07-20 NOTE — ASSESSMENT & PLAN NOTE
Lab Results   Component Value Date    LACTICACID 1.7 07/20/2023    LACTICACID 2.8 (HH) 07/19/2023    LACTICACID 1.4 07/19/2023    LACTICACID 3.0 (HH) 07/19/2023    LACTICACID 4.7 () 07/19/2023    LACTICACID 4.0 ([address] [place] [address],[address]) 07/19/2023     · On admission patient was noted to have elevated lactic acid suspected to be related to colitis. Patient was admitted to medical service with unresolved lactic acidosis and abdominal pain. Patient was evaluated by surgery and found to have ischemic bowel. · She received volume resuscitation with improvement of lactic acid down to 1.4, and was taken to the operating room by general surgery. · Patient admitted to stepdown level 1 postoperatively  · POD #1 s/p ex lap with bowel resection, lysis of adhesions, laparoscopic takedown of left colon, resection of transverse colon, splenic flexure, and descending colon, sigmoidoscopy, mobilization of right colon. · Postop lactic acid 2.8, improved down to 1.7 with IV fluids.   · No further need to trend

## 2023-07-20 NOTE — PLAN OF CARE
Problem: PAIN - ADULT  Goal: Verbalizes/displays adequate comfort level or baseline comfort level  Description: Interventions:  - Encourage patient to monitor pain and request assistance  - Assess pain using appropriate pain scale  - Administer analgesics based on type and severity of pain and evaluate response  - Implement non-pharmacological measures as appropriate and evaluate response  - Consider cultural and social influences on pain and pain management  - Notify physician/advanced practitioner if interventions unsuccessful or patient reports new pain  Outcome: Progressing     Problem: INFECTION - ADULT  Goal: Absence or prevention of progression during hospitalization  Description: INTERVENTIONS:  - Assess and monitor for signs and symptoms of infection  - Monitor lab/diagnostic results  - Monitor all insertion sites, i.e. indwelling lines, tubes, and drains  - Monitor endotracheal if appropriate and nasal secretions for changes in amount and color  - [place] appropriate cooling/warming therapies per order  - Administer medications as ordered  - Instruct and encourage patient and family to use good hand hygiene technique  - Identify and instruct in appropriate isolation precautions for identified infection/condition  Outcome: Progressing  Goal: Absence of fever/infection during neutropenic period  Description: INTERVENTIONS:  - Monitor WBC    Outcome: Progressing     Problem: SAFETY ADULT  Goal: Patient will remain free of falls  Description: INTERVENTIONS:  - Educate patient/family on patient safety including physical limitations  - Instruct patient to call for assistance with activity   - Consult OT/PT to assist with strengthening/mobility   - Keep Call bell within reach  - Keep bed low and locked with side rails adjusted as appropriate  - Keep care items and personal belongings within reach  - Initiate and maintain comfort rounds  - Make Fall Risk Sign visible to staff  - Apply yellow socks and bracelet for high fall risk patients  - Consider moving patient to room near nurses station  Outcome: Progressing  Goal: Maintain or return to baseline ADL function  Description: INTERVENTIONS:  -  Assess patient's ability to carry out ADLs; assess patient's baseline for ADL function and identify physical deficits which impact ability to perform ADLs (bathing, care of mouth/teeth, toileting, grooming, dressing, etc.)  - Assess/evaluate cause of self-care deficits   - Assess range of motion  - Assess patient's mobility; develop plan if impaired  - Assess patient's need for assistive devices and provide as appropriate  - Encourage maximum independence but intervene and supervise when necessary  - Involve family in performance of ADLs  - Assess for home care needs following discharge   - Consider OT consult to assist with ADL evaluation and planning for discharge  - Provide patient education as appropriate  Outcome: Progressing  Goal: Maintains/Returns to pre admission functional level  Description: INTERVENTIONS:  - Perform BMAT or MOVE assessment daily.   - Set and communicate daily mobility goal to care team and patient/family/caregiver.    - Collaborate with rehabilitation services on mobility goals if consulted  - Out of bed for toileting  - Record patient progress and toleration of activity level   Outcome: Progressing     Problem: DISCHARGE PLANNING  Goal: Discharge to home or other facility with appropriate resources  Description: INTERVENTIONS:  - Identify barriers to discharge w/patient and caregiver  - Arrange for needed discharge resources and transportation as appropriate  - Identify discharge learning needs (meds, wound care, etc.)  - Arrange for interpretive services to assist at discharge as needed  - Refer to Case Management Department for coordinating discharge planning if the patient needs post-hospital services based on physician/advanced practitioner order or complex needs related to functional status, cognitive ability, or social support system  Outcome: Progressing     Problem: Knowledge Deficit  Goal: Patient/family/caregiver demonstrates understanding of disease process, treatment plan, medications, and discharge instructions  Description: Complete learning assessment and assess knowledge base.   Interventions:  - Provide teaching at level of understanding  - Provide teaching via preferred learning methods  Outcome: Progressing

## 2023-07-20 NOTE — ASSESSMENT & PLAN NOTE
· Home regimen with Cardizem  mg, metoprolol succinate 100 mg twice daily, lasix  · Currently normotensive

## 2023-07-20 NOTE — UTILIZATION REVIEW
Notification of Unplanned, Urgent, or   Emergency Inpatient Admission   [address] [facility], [facility]  Tax ID: [id number]  NPI: [id number]  Place of Service: [address]  Admission Level of Care: Inpatient  Place of Service Code: 24     Attending Physician Information  Attending Name and NPI#: Perez Kramer, [place] [[id number]]  Phone: [phone]     Admission Information  Inpatient Admission Date/Time: 7/19/23  3:07 AM  Discharge Date/Time: No discharge date for patient encounter. Admitting Diagnosis Code/Description:  Lactic acidosis [E87.20]  Colitis [K52.9]  GI bleeding [K92.2]  GI bleed [K92.2]     Utilization Review Contact  Luis F Cha Utilization   Phone: [phone]  Fax: [phone]  Email: [email or web address]  Contact for approvals/pending authorizations, clinical reviews, and discharge. Physician Advisory Services Contact  Medical Necessity Denial & [id number]-[id number] Discussion  Phone: [phone]  Fax: [phone]  Email: [email or web address]

## 2023-07-20 NOTE — ASSESSMENT & PLAN NOTE
· Received Exparel intraoperatively  · Started on PCA with Dilaudid  · May need to consider APS consult if pain not controlled

## 2023-07-20 NOTE — ASSESSMENT & PLAN NOTE
· Initial 7/18 CT abdomen pelvis with "extensive colitis without pericolonic free air or abscess. · Repeat 7/19 CT abdomen pelvis with contrast " proximal SMA stent is patent. Cannot exclude some degree of stenosis at the origin of the RAMBO. Redemonstrated colitis somewhat matching the RAMBO distribution but sparing the sigmoid colon and rectum. No pneumatosis, abscess, or perforation. · General surgery was consulted, appreciate recommendations  · Patient initially had persistent lactic acidosis, was volume resuscitated by general surgery. Lactic acidosis improved. · Patient now POD #1 s/p ex lap with bowel resection, lysis of adhesions, laparoscopic takedown of left colon, resection of transverse colon, splenic flexure, and descending colon, sigmoidoscopy, mobilization of right colon. · Patient transferred to stepdown level 1 with medical critical care postoperatively. Patient did have extensive anesthesia exposure.   · Check postop endpoints  · Continue volume resuscitation as needed  · Maintenance IVF with Isolyte at 125 mL/hr  · N.p.o.  · Maintain NGT  · Received p.o. meds when clear with surgery

## 2023-07-20 NOTE — ASSESSMENT & PLAN NOTE
• Follows with [facility] neurology in [facility]. [facility] headache center for chronic migraines  • Was started on Diamox, patient was concerned about side effects and decreased to 1 tab daily  • As needed Decadron

## 2023-07-20 NOTE — ASSESSMENT & PLAN NOTE
• Patient follows with neurology as outpatient, suspected to be exacerbated by untreated CAMILLE. Patient was referred for outpatient sleep study.   • Continue home Diamox

## 2023-07-20 NOTE — PROGRESS NOTES
[facility] - Progress Note  Lor Monge 70 y.o. female MRN: [id number]  Unit/Bed#: ICU 05 Encounter: [id number]      ASSESSMENT & PLAN:    20-year-old female with hypertension, hyperlipidemia, A-fib on apixaban, CAD, COPD, SMA thrombosis status post stent 2021 presenting with abdominal pain and hematochezia, with CT scan showing transverse descending and sigmoid colitis, with lactic acidosis and subsequent CT angiogram showing concern for ischemic colitis of the RAMBO distribution, status post flex sig 7/19/2023 with necrosis visualized and subsequent hemicolectomy performed by surgery team.    Ischemic colitis  • Postop day 1 from hemicolectomy  • No additional inpatient GI interventions at this moment planned  • We will follow up outpatient for consideration for colonoscopy after healing from hemicolectomy and primary anastamosis    We will sign off, please call back if additional questions or concerns. ______________________________________________________________________    SUBJECTIVE:     Status post left hemicolectomy last night. Patient this morning with expected abdominal pain, no acute events.   Scheduled Meds:  Current Facility-Administered Medications   Medication Dose Route Frequency Provider Last Rate   • aluminum-magnesium hydroxide-simethicone  30 mL Oral Q6H PRN JUD Will     • cefTRIAXone  1,000 mg Intravenous Q24H JUD Yates 1,000 mg (07/20/23 0727)   • chlorhexidine  15 mL Mouth/Throat Q12H [facility] JUD Yates     • diltiazem  15 mg Intravenous BID Roldan Began, DO     • famotidine  20 mg Intravenous HS JUD Will     • heparin (porcine)  5,000 Units Subcutaneous [facility] Naida Hooker, [address] [facility]     • HYDROmorphone   Intravenous Continuous JUD Yates     • metoprolol  5 mg Intravenous 4x Daily Svetlana Archibald MD     • multi-electrolyte  125 mL/hr Intravenous Continuous JUD Michel 125 mL/hr (07/19/23 2243)   • naloxone  0.04 mg Intravenous Q3 min PRN JUD Michel     • ondansetron  4 mg Intravenous Q6H PRN JUD Michel     • polyethylene glycol  17 g Oral Daily PRN JUD Michel     • potassium chloride  20 mEq Intravenous Q2H Caralenanci Bun Bevak, DO 20 mEq (07/20/23 1313)     Continuous Infusions:HYDROmorphone,   multi-electrolyte, 125 mL/hr, Last Rate: 125 mL/hr (07/19/23 2243)      PRN Meds:.•  aluminum-magnesium hydroxide-simethicone  •  naloxone  •  ondansetron  •  polyethylene glycol    OBJECTIVE:     Objective   Blood pressure 142/57, pulse 86, temperature 98.9 °F (37.2 °C), temperature source Oral, resp. rate 17, height 5' 4" (1.626 m), weight 86.2 kg (190 lb 0.6 oz), SpO2 96 %, not currently breastfeeding. Body mass index is 32.62 kg/m². Intake/Output Summary (Last 24 hours) at 7/20/2023 1437  Last data filed at 7/20/2023 6188  Gross per 24 hour   Intake 4636.62 ml   Output 1420 ml   Net 3216.62 ml       PHYSICAL EXAM:   General Appearance: Awake and alert, in no acute distress  Abdomen: Soft, non-tender, non-distended; no masses or no organomegaly    Invasive Devices     Peripheral Intravenous Line  Duration           Peripheral IV 07/19/23 Dorsal (posterior); Right Wrist <1 day    Peripheral IV 07/19/23 Left Hand <1 day          Drain  Duration           NG/OG/Enteral Tube Nasogastric Left nare <1 day    Urethral Catheter 16 Fr. <1 day                LAB RESULTS:      Lab Units 07/20/23  0416 07/19/23  2347 07/19/23  0728 07/18/23  2244 05/23/23  1130 02/20/23  1403 09/03/22  1752 09/02/22  0501   SODIUM mmol/L 139 139 139 138 134* 135   < > 140   POTASSIUM mmol/L 3.3* 3.6 3.6 3.3* 3.8 4.7   < > 3.3*   CHLORIDE mmol/L 112* 111* 108 105 102 101   < > 104   CO2 mmol/L 18* 18* 18* 19* 28 26   < > 27   BUN mg/dL 13 13 18 18 15 22   < > 22   CREATININE mg/dL 0.70 0.70 0.97 0.99 0.87 0.66   < > 0.87   GLUCOSE RANDOM mg/dL 171* 177* 185* 149*  --  94   < > 103   CALCIUM mg/dL 7.6* 7.5* 9.3 9.5 9.3 9.4   < > 9.5   MAGNESIUM mg/dL  --  1.7*  --   --   --   --   --  2.7*   PHOSPHORUS mg/dL  --  2.1*  --   --   --   --   --   --     < > = values in this interval not displayed. Lab Units 07/18/23  2244 05/23/23  1130 09/21/22  1329 06/06/22  1445 05/03/22  2131   TOTAL PROTEIN g/dL 7.7 7.4  7.3 7.5 8.2 7.5   ALBUMIN g/dL 4.7 4.1 3.9 4.7 4.2   TOTAL BILIRUBIN mg/dL 0.47 0.66 0.55 0.82 0.90   AST U/L 17 19 20 29 38   ALT U/L 14 21 28 46 35   ALK PHOS U/L 118* 121* 115 133* 117*           Lab Units 07/20/23  0416 07/19/23  2347 07/19/23  0728 07/18/23  2244 05/23/23  1130   WBC Thousand/uL 15.73* 14.85* 27.90* 27.64* 7.17   HEMOGLOBIN g/dL 12.3 13.1 15.4 16.0* 13.9   HEMATOCRIT % 37.4 39.4 45.5 47.8* 41.2   PLATELETS Thousands/uL 177 220 255 249 250   MCV fL 91 90 89 90 89       Lab Results   Component Value Date    FERRITIN 49 06/06/2022       Lab Results   Component Value Date    INR 1.11 07/18/2023    INR 1.14 02/20/2023    INR 1.00 02/01/2023    PROTIME 14.4 07/18/2023    PROTIME 14.6 (H) 02/20/2023    PROTIME 13.2 02/01/2023       RADIOLOGY RESULTS:   Procedure: Flexible Sigmoidoscopy    Addendum Date: 7/19/2023 Addendum:   79-25 [facility] Endoscopy [address] [place], [place] [phone] DATE OF SERVICE: 7/19/23 PHYSICIAN(S): Attending: Christian Moran MD Fellow: Kavya Duran MD INDICATION: Hematochezia POST-OP DIAGNOSIS: See the impression below. HISTORY: Prior colonoscopy: Less than 3 years ago. It is being repeated at an interval of less than 3 years because: This colonoscopy is being performed for a diagnostic indication BOWEL PREPARATION: Enema PREPROCEDURE: Informed consent was obtained for the procedure, including sedation. Risks including but not limited to bleeding, infection, perforation, adverse drug reaction and aspiration were explained in detail.  Also explained about less than 100% sensitivity with the exam and other alternatives. The patient was placed in the left lateral decubitus position. Procedure: Colonoscopy DETAILS OF PROCEDURE: Patient was taken to the procedure room where a time out was performed to confirm correct patient and correct procedure. The patient underwent monitored anesthesia care, which was administered by an anesthesia professional. The patient's blood pressure, heart rate, level of consciousness, oxygen and respirations were monitored throughout the procedure. A digital rectal exam was performed. The scope was introduced through the anus and advanced to the descending colon. Retroflexion was performed in the rectum. The patient experienced no blood loss. The procedure was not difficult. The patient tolerated the procedure well. There were no apparent adverse events. ANESTHESIA INFORMATION: ASA: III Anesthesia Type: IV Sedation with Anesthesia MEDICATIONS: sodium chloride 0.9 % infusion 300 mL*  *From user-documented volume (Totals for administrations occurring from 1628 to 1643 on 07/19/23) FINDINGS: Patchy edematous, erythematous, friable and ulcerated mucosa in the descending colon. Necrotic mucosa was seen at 60 cm from anal verge. Scope was then withdrawn. Edematous and erythematous mucosa in the sigmoid colon; there was stigmata of recent hemorrhage Multiple diverticula in the sigmoid colon External and internal small hemorrhoids EVENTS: Procedure Events Event Event Time SPECIMENS: * No specimens in log * EQUIPMENT: Colonoscope - IMPRESSION: Necrotic mucosa in the descending colon Edematous and erythematous mucosa in the sigmoid colon; there was stigmata of recent hemorrhage Diverticulosis in the sigmoid colon Small hemorrhoids RECOMMENDATION:  There is no recommended follow-up for this procedure. Please discussed with general surgery team. Plan to continue resuscitation and potential surgical exploration/colectomy tonight.   Fabrizio Griffiths MD Result Date: 7/19/2023  Narrative: Table formatting from the original result was not included. 79-25 [facility] Endoscopy [address] [place] [address] [phone] DATE OF SERVICE: 7/19/23 PHYSICIAN(S): Attending: Christian Moran MD Fellow: Kavya Duran MD INDICATION: Hematochezia POST-OP DIAGNOSIS: See the impression below. HISTORY: Prior colonoscopy: Less than 3 years ago. It is being repeated at an interval of less than 3 years because: This colonoscopy is being performed for a diagnostic indication BOWEL PREPARATION: Enema PREPROCEDURE: Informed consent was obtained for the procedure, including sedation. Risks including but not limited to bleeding, infection, perforation, adverse drug reaction and aspiration were explained in detail. Also explained about less than 100% sensitivity with the exam and other alternatives. The patient was placed in the left lateral decubitus position. Procedure: Colonoscopy DETAILS OF PROCEDURE: Patient was taken to the procedure room where a time out was performed to confirm correct patient and correct procedure. The patient underwent monitored anesthesia care, which was administered by an anesthesia professional. The patient's blood pressure, heart rate, level of consciousness, oxygen and respirations were monitored throughout the procedure. A digital rectal exam was performed. The scope was introduced through the anus and advanced to the descending colon. Retroflexion was performed in the rectum. The patient experienced no blood loss. The procedure was not difficult. The patient tolerated the procedure well. There were no apparent adverse events.  ANESTHESIA INFORMATION: ASA: III Anesthesia Type: IV Sedation with Anesthesia MEDICATIONS: sodium chloride 0.9 % infusion 300 mL*  *From user-documented volume (Totals for administrations occurring from 1628 to 1643 on 07/19/23) FINDINGS: Patchy edematous, erythematous, friable and ulcerated mucosa in the descending colon. Necrotic mucosa was seen at 60 cm from anal verge. Scope was then withdrawn. Edematous and erythematous mucosa in the sigmoid colon; there was stigmata of recent hemorrhage Multiple diverticula in the sigmoid colon External and internal small hemorrhoids EVENTS: Procedure Events Event Event Time SPECIMENS: * No specimens in log * EQUIPMENT: Colonoscope -     Impression: Patchy edematous, erythematous, friable and ulcerated mucosa in the descending colon Edematous and erythematous mucosa in the sigmoid colon; there was stigmata of recent hemorrhage Diverticulosis in the sigmoid colon Small hemorrhoids RECOMMENDATION:  There is no recommended follow-up for this procedure. Please discussed with general surgery team. Plan to continue resuscitation and potential surgical exploration/colectomy tonight. Kingsley eSgura MD     Procedure: CTA abdomen pelvis w wo contrast    Addendum Date: 7/19/2023 Addendum:   ADDENDUM: The major results were discussed with the ordering clinician, Dr. Larry Rdz, 7/19/2023 at [phone]. Result Date: 7/19/2023  Narrative: CT ANGIOGRAM OF THE ABDOMEN AND PELVIS WITH AND WITHOUT IV CONTRAST INDICATION:   concern for ischemic colitis. COMPARISON: None. TECHNIQUE:  CT angiogram examination of the abdomen and pelvis was performed according to standard protocol. This examination, like all CT scans performed in the [facility], was performed utilizing techniques to minimize radiation dose exposure, including the use of iterative reconstruction and automated exposure control. Contrast as well as noncontrast images were obtained. Rad dose 1643 mGy-cm IV Contrast:  85 mL of iohexol (OMNIPAQUE) Enteric Contrast:  Enteric contrast was not administered. FINDINGS: VASCULAR STRUCTURES: The visualized aorta is normal in caliber and patent. The celiac trunk is patent and of normal caliber. Redemonstrated is stent through the proximal SMA, patent.  The SMA is of unchanged caliber, patent. The renal arteries are patent and of normal caliber. The RAMBO is patent. Evaluation is limited due to small caliber, cannot exclude some degree of stenosis at the origin, unchanged from prior studies going back to 5/25/2023. The iliac and visualized femoral arteries are patent and of normal caliber. OTHER FINDINGS ABDOMEN LOWER CHEST: Discoid density at the left lung base, appearance suggestive of subsegmental atelectasis or scarring. LIVER/BILIARY TREE:  Unremarkable. GALLBLADDER:  No calcified gallstones. No pericholecystic inflammatory change. Vicarious excretion of contrast. SPLEEN:  Unremarkable. PANCREAS:  Unremarkable. ADRENAL GLANDS:  Unremarkable. KIDNEYS/URETERS:  Unremarkable. No hydronephrosis. Excreted previously administered contrast in the collecting systems STOMACH AND BOWEL: Redemonstrated wall thickening and pericolic stranding in the distal transverse colon, splenic flecture, and descending colon, relatively sparing the sigmoid colon and rectum. Normal-appearing right colon. Surgically absent appendix. APPENDIX: Surgically absent ABDOMINOPELVIC CAVITY:  No ascites. No pneumoperitoneum. No lymphadenopathy. VESSELS: As above. PELVIS REPRODUCTIVE ORGANS:  Unremarkable for patient's age. URINARY BLADDER: Mildly distended and grossly unremarkable. ABDOMINAL WALL/INGUINAL REGIONS: There is a small fat-containing umbilical hernia. OSSEOUS STRUCTURES:  No acute fracture or destructive osseous lesion. Impression: No significant interval change in the appearance of the major arteries since at least 5/25/2023. No aortic or major arterial occlusion. Proximal SMA stent, patent. Cannot exclude some degree of stenosis at the origin of the RAMBO, however not significantly  changed. Redemonstrated is colitis somewhat matching the RAMBO distribution but sparing sigmoid colon and rectum. No pneumatosis, abscess, perforation. Incidental findings, as per the body of the report.  Workstation performed: [id number]     Procedure: CT abdomen pelvis with contrast    Result Date: 7/19/2023  Narrative: CT ABDOMEN AND PELVIS WITH IV CONTRAST INDICATION:   RLQ abdominal pain (Age >= 14y) Lower abd pain, bloody stools. COMPARISON: 5/25/2023. TECHNIQUE:  CT examination of the abdomen and pelvis was performed. Multiplanar 2D reformatted images were created from the source data. This examination, like all CT scans performed in the [facility], was performed utilizing techniques to minimize radiation dose exposure, including the use of iterative reconstruction and automated exposure control. Radiation dose length product (DLP) for this visit:  784 mGy-cm IV Contrast:  100 mL of iohexol (OMNIPAQUE) Enteric Contrast:  Enteric contrast was not administered. FINDINGS: ABDOMEN LOWER CHEST:  No clinically significant abnormality identified in the visualized lower chest. LIVER/BILIARY TREE:  Unremarkable. GALLBLADDER:  No calcified gallstones. No pericholecystic inflammatory change. SPLEEN:  Unremarkable. PANCREAS:  Unremarkable. ADRENAL GLANDS:  Unremarkable. KIDNEYS/URETERS:  Unremarkable. No hydronephrosis. STOMACH AND BOWEL: Stomach is mildly distended with air and fluid. No gross intraluminal mass or irregular wall thickening. Small bowel loops are normal in course and caliber without evidence for obstruction or inflammation. Terminal ileum is unremarkable. Appendix is surgically absent. Extensive circumferential wall thickening with mucosal hyperenhancement involving the transverse, descending, and sigmoid colon noted consistent with extensive colitis. No pericolonic free air or abscess identified. APPENDIX:  No findings to suggest appendicitis. ABDOMINOPELVIC CAVITY:  No ascites. No pneumoperitoneum. No lymphadenopathy. VESSELS:  Atherosclerotic changes are present. No evidence of aneurysm. PELVIS REPRODUCTIVE ORGANS:  Unremarkable for patient's age. URINARY BLADDER: Mildly distended and grossly unremarkable. ABDOMINAL WALL/INGUINAL REGIONS:  Unremarkable. OSSEOUS STRUCTURES:  No acute fracture or destructive osseous lesion. Impression: Extensive colitis as described above without pericolonic free air or abscess. Recommend follow-up colonoscopy when clinically appropriate. Workstation performed: [id number]     Narrative/Impressions - 3 day look back     Radha Hernández M.D.  PGY-4 Gastroenterology Fellow  [facility]  Available on Austen Taylor. [email or web address]

## 2023-07-20 NOTE — CASE MANAGEMENT
Case Management Assessment & Discharge Planning Note    Patient name Donavon Calhoun  Location ICU 05/ICU 05 MRN [id number]  : 1960 Date 2023       Current Admission Date: 2023  Current Admission Diagnosis:Colonic ischemia [facility])   Patient Active Problem List    Diagnosis Date Noted   • Acute postoperative pain 2023   • Colitis 2023   • Chronic migraine w/o aura w/o status migrainosus, not intractable 2023   • IIH (idiopathic intracranial hypertension) 2023   • Hematochezia 2023   • Left carotid stenosis 2023   • Colonic ischemia ([address]) 2023   • Injury of left facial nerve 2023   • Partial facial nerve palsy 2023   • Hepatic steatosis 2022   • Asymptomatic stenosis of left carotid artery 10/05/2022   • Stenosis of left carotid artery 2022   • Carotid artery stenosis 2022   • Appendix disease 2022   • Urinary retention 2022   • Peripheral vascular disease ([address]) 2022   • 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   • COPD (chronic obstructive pulmonary disease) ([address]) 2021   • Unspecified diastolic (congestive) heart failure ([address]) 2021   • Hypertensive heart disease with congestive heart failure ([address]) 2020   • CAMILLE (obstructive sleep apnea)    • Lumbar radiculopathy 10/01/2020   • Paroxysmal atrial fibrillation ([address]) 2020   • Allergic conjunctivitis of both eyes 2020   • Angio-edema 2020   • Gastroesophageal reflux disease without esophagitis 2019   • Allergic reaction 2018   • AMD (age-related macular degeneration), bilateral 2018   • Angina pectoris ([address]) 11/15/2017   • Migraines 10/14/2016   • Essential hypertension 2015      LOS (days): 1  Geometric Mean LOS (GMLOS) (days): 3.00  Days to GMLOS:1.7     OBJECTIVE:    Risk of Unplanned Readmission Score: 22.3         Current admission status: Inpatient       Preferred Pharmacy:   [address] [place] [address] [address] - [place] - [address]  Phone: [phone] Fax: [phone]    [address] [facility], [address] [facility] [address] [facility]  [address] [facility]  [address] [facility]  Phone: [phone] Fax: [address] [place], [place] - [address] [facility],  [address] [facility],  [address] [facility] [address]  Phone: [phone] Fax: [phone]    Primary Care Provider: Janee Sy DO    [place] [facility] REP  Secondary Insurance:     ASSESSMENT:  [address] [facility] , [address] Representative - Friend   Primary Phone: [phone] (Mobile)               Advance Directives  Does patient have a [address]?: No (pt reported she was working on making Mike gutierrez her POA)  Primary Contact: Mike gutierrez (friend) [phone]              Patient Information  Admitted from[de-identified] Home  Mental Status: Alert  During Assessment patient was accompanied by: Not accompanied during assessment  Assessment information provided by[de-identified] Patient  Primary Caregiver: Self  Support Systems: [place] of Residence: [facility] do you live in?: [address] [facility] entry access options.  Select all that apply.: Stairs  Number of steps to enter home.:  (7 in front, 1 in back)  Type of Current Residence: Apartment  Floor Level: 1  Upon entering residence, is there a bedroom on the main floor (no further steps)?: Yes  Upon entering residence, is there a bathroom on the main floor (no further steps)?: Yes  In the last 12 months, was there a time when you were not able to pay the mortgage or rent on time?: No  In the last 12 months, how many places have you lived?: 1  In the last 12 months, was there a time when you did not have a steady place to sleep or slept in a shelter (including now)?: No  Homeless/housing insecurity resource given?: N/A  Living Arrangements: Lives Alone    Activities of Daily Living Prior to Admission  Functional Status: Independent  Completes ADLs independently?: Yes  Ambulates independently?: Yes  Does patient use assisted devices?: No  Does patient currently own DME?: No (used to have commode and walker, but reported she gave it away when she no longer needed it)  Does patient have a history of Outpatient Therapy (PT/OT)?: Yes  Does the patient have a history of Short-Term Rehab?: No  Does patient have a history of HHC?: Yes ([facility])  Does patient currently have [address]?: No         Patient Information Continued  Does patient have prescription coverage?: Yes  Food insecurity resource given?: N/A  Does patient receive dialysis treatments?: No  Does patient have a history of substance abuse?: No         Means of Transportation  Means of Transport to Appts[de-identified] Drives Self  Was application for public transport provided?: N/A        DISCHARGE DETAILS:    Discharge planning discussed with[de-identified] Patient        CM contacted family/caregiver?: No- see comments (declined need at this time)                       DME Referral Provided  Referral made for DME?: No    Other Referral/Resources/Interventions Provided:  Interventions: None Indicated                                                      Additional Comments: Pt reported she will need Lyft ride upon d/c if going home. PT/OT evaluation pending. CM dept to follow.

## 2023-07-20 NOTE — OP NOTE
OPERATIVE REPORT  PATIENT NAME: Marisol Underwood    :  1960  MRN: [id number]  Pt Location: [facility] OR ROOM 02    SURGERY DATE: 2023    Surgeon(s) and Role:     * Anni Savage MD - Primary     * Magan Rebollar MD - Darnell Martini MD - Assisting first    Preop Diagnosis:  Hematochezia K92.1  Ischemic colon  Eliquis    Post-Op Diagnosis Codes:     * Hematochezia [K92.1]     Ischemic transverse colon proximal mid distal  Ischemic splenic flexure  Ischemic descending colon to the level of the sigmoid colon. Adhesions  Morbid obesity    Procedure(s):  LAPAROTOMY EXPLORATORY W/ BOWEL RESECTION    LAPAROSCOPY DIAGNOSTIC. LYSIS OF ADHESIONS. LAPARASCOPY TAKE TAKEDOWN OF LEFT COLON. EXPLORATORY LAPAROSCOPY LYSIS OF ADHESIONS. RESECTION TRANSVERSE COLON , SPLENIC FLEXURE, . AND DESCENDING COLON . TAKE DOWN OF SPLENIC FLEXURE. SIGMOIDOSCOPY. MOBILIZATION OF RIGHT COLON. PRIMARY ANASTOMOSIS EEA 29. TAP BLOCK    Specimen(s):  ID Type Source Tests Collected by Time Destination   1 : Transverse colon, splenic flexure descending colon stitch is distal Tissue Colon TISSUE EXAM Anni Savage MD 2023 1937    2 : Additional descending colon Tissue Colon TISSUE EXAM Anni Savage MD 2023 2150        Estimated Blood Loss:   20 mL    Drains:  NG/OG/Enteral Tube Nasogastric Left nare (Active)   Number of days: 0       Urethral Catheter 16 Fr.  (Active)   Reasons to continue Urinary Catheter  Post-operative urological requirements 23 1649   Goal for Removal No longer needed- Will place order to discontinue 23 1649   Site Assessment Clean 23 1649   Fenton Care Done 23 1649   Collection Container Standard drainage bag 23 1649   Securement Method Securing device (Describe) 23 1649   Number of days: 0       Anesthesia Type:   General    Operative Indications:  Hematochezia K92.1  Ischemiccolon    Operative Findings:  Ischemic transverse colon and its entire length,  Ischemic splenic flexure  Ischemic descending colon to the level of the sigmoid:  Adhesions  Central obesity    Should be noted the patient underwent a modified Cattell  Brasch maneuver mobilizing the right colon so that the cecum is in the right upper quadrant without torsion. The patient does not have an appendix that she underwent a previous appendectomy. Was to provide adequate length for the right colon to sigmoid colon anastomosis. Decision was made to perform an anastomosis secondary to the patient's very large abdominal wall thickness which would preclude a safe colostomy. Or even ileostomy. Complications:   None    Procedure and Technique:  The patient was seen again in the Holding Room. The risks, benefits, complications, treatment options, and expected outcomes were discussed with the patient. The patient and/or family concurred with the proposed plan, giving informed consent. Informed consent was personally discussed with the patient and reviewed. The site of surgery properly noted/marked. The patient was taken to Operating Room, identified as Tosha Dean  and the procedure verified. A Time Out was held after prepping and draping in sterile fashion. The above information was confirmed. Prior to the induction of general anesthesia, antibiotic prophylaxis was administered. General endotracheal anesthesia was then administered and tolerated well. After the induction, the surgical area was prepped in the usual sterile fashion. An infraumbilical incision was made. Dissection carried to the fascia. The abdomen was entered under direct vision. Pneumoperitoneum created and the camera inserted. 2 additional 5 mm ports were placed. Immediately was seen in ischemic transverse colon from the junction of the proximal to mid transverse colon all the way to the splenic flexure. Due to the patient's severe central obesity was difficult to identify the splenic flexure. However the left colon was identified and was ischemic through almost all of the descending length. Using the aforementioned ports and the LigaSure, the white line of Toldt was taken from the sigmoid colon up to the level of the splenic flexure. This allowed for good mobilization of the descending colon. A midline laparotomy was then carried out. A midline incision was made. Dissection carried out to the fascia and the abdomen was opened. A large wound protector was placed. Adhesions were lysed especially to the left upper quadrant in order to access the abdomen. The omentum was taken off of the transverse colon along its entire length using a LigaSure device or cautery where appropriate. This was to the level of the approaching splenic flexure. A KI 70 device was come across the proximal transverse colon. The left colon was mobilized previously laparoscopically and this was brought out into the wound. At the level of the area of what appeared to be viable descending colon a KI stapling was brought device was brought across the colon. Using a back-and-forth technique taking the mesentery of the colon and adhesions to the sidewall, as well as the omentum, the splenic flexure was taken down carefully. This was mostly done using LigaSure and cautery. After lengthy and tedious careful dissection, the colon was rolled up off the splenic flexure attachments and the mesocolon taken using the LigaSure super jaw device. Stasis was assured. The spleen was intact without any evidence of bleeding. The almost entire transverse colon, splenic flexure, and descending colon were excised. This was sent off the field. Additional mobilization of the left colon revealed some additional ischemia once this colon was opened to prepare for anastomosis. Therefore an additional length of descending colon was taken down to the sigmoid colon. However the patient did have a redundant sigmoid colon.     A sigmoidoscopy was carried out to see if the stapling device could be brought transanally to the end of the sigmoid colon. However this could not be achieved safely. The sigmoidoscopy was carried out however in its completion to the level of the pelvic brim. There was no evidence of ischemia in this area. The hepatic flexure was mobilized as well as the right colon to allow for rotation of the cecum to the right upper quadrant. This allowed for enough mobility to perform a colonic anastomosis. A trans colonic stapling anastomosis was carried out placing the stapler in the proximal right colon through a longitudinal enterotomy, to the anvil which had been placed in the stump of the sigmoid colon and secured using a 2-0 Prolene pursestring suture. The anastomosis was carried out under direct vision. There were 2 intact donuts. The entire staple line was oversewn using 3-0 silk sutures. The longitudinal colotomy was closed transversely in 2 layers using 3-0 PDS inner layer x2, and imbricated with 3 oh Lembert sutures interrupted. Should be noted there was viable and brisk bleeding from the raw edges of the colon before the anastomosis indicating good blood supply. There is no evidence of ischemia of the right colon or hepatic flexure or in the sigmoid colon. The anastomosis was intact without evidence of leak. The wound was copiously irrigated using saline. A intraperitoneal modified tap block was carried out using a spiral and Marcaine bilaterally. Gowns and gloves were changed. The wound protector had been removed. Closure tray was used. The wound was closed using looped #1 PDS x2 and a 4-1 running fashion. The wound was irrigated and closed using staples. Due to the patient's central morbid obesity she is at high risk for developing intra-abdominal or subcutaneous infection. There was no evidence of ischemia in the small bowel, liver or stomach or rectum or sigmoid colon.   There was no evidence of malignancy upon exploration. The patient tolerated the procedure in stable condition. She was transferred to the ICU for aggressive resuscitation and monitoring. I was present for the entire procedure.     Patient Disposition:  Critical Care Unit    This procedure was not performed to treat colon cancer through resection      SIGNATURE: Gretchen Hyman MD  DATE: July 19, 2023  TIME: 10.15 pm

## 2023-07-20 NOTE — ASSESSMENT & PLAN NOTE
• On admission presented with abdominal pain, nausea vomiting, and rectal bleeding. • On chart review patient was initially complaining of constipation x5 days, then passed a hard BM and had multiple episodes of BRBPR. • Hemoglobin stable  • Eliquis currently on hold secondary to abdominal surgery.

## 2023-07-20 NOTE — CONSULTS
[facility]  Consult  Name: Abiodun Cunningham 70 y.o. female I MRN: [id number]  Unit/Bed#: ICU 05 I Date of Admission: 7/18/2023   Date of Service: 7/20/2023 I Hospital Day: 1    Consults    Assessment/Plan   * Colonic ischemia [facility])  Assessment & Plan  · Initial 7/18 CT abdomen pelvis with "extensive colitis without pericolonic free air or abscess. · Repeat 7/19 CT abdomen pelvis with contrast " proximal SMA stent is patent. Cannot exclude some degree of stenosis at the origin of the RAMBO. Redemonstrated colitis somewhat matching the RAMBO distribution but sparing the sigmoid colon and rectum. No pneumatosis, abscess, or perforation. · General surgery was consulted, appreciate recommendations  · Patient initially had persistent lactic acidosis, was volume resuscitated by general surgery. Lactic acidosis improved. · Patient now POD #1 s/p ex lap with bowel resection, lysis of adhesions, laparoscopic takedown of left colon, resection of transverse colon, splenic flexure, and descending colon, sigmoidoscopy, mobilization of right colon. · Patient transferred to stepdown level 1 with medical critical care postoperatively. Patient did have extensive anesthesia exposure. · Check postop endpoints  · Continue volume resuscitation as needed  · Maintenance IVF with Isolyte at 125 mL/hr  · N.p.o.  · Maintain NGT  · Received p.o. meds when clear with surgery    Hematochezia  Assessment & Plan  • On admission presented with abdominal pain, nausea vomiting, and rectal bleeding. • On chart review patient was initially complaining of constipation x5 days, then passed a hard BM and had multiple episodes of BRBPR. • Hemoglobin stable  • Eliquis currently on hold secondary to abdominal surgery. Colitis  Assessment & Plan  · Patient was admitted under medicine with suspicion for colitis.   · Patient found to have ischemic colon  · See above    Paroxysmal atrial fibrillation [facility])  Assessment & Plan  · Was on Eliquis preoperatively, received Kcentra for reversal  · Rate control with diltiazem and metoprolol  · Currently rate controlled on telemetry  · Resume AC once okay with surgery  · Resume p.o. rate control meds when okay with surgery    Acute postoperative pain  Assessment & Plan  · Received Exparel intraoperatively  · Started on PCA with Dilaudid  · May need to consider APS consult if pain not controlled    Left carotid stenosis  Assessment & Plan  • Admitted September 2022 on stroke pathway. MRI was negative. Believed her symptoms were attributed to migraine and patient was discharged on aspirin and Eliquis. • Ultimately patient was diagnosed with left ICA stenosis 80% under went unsuccessful CEA with subsequent left facial numbness and left facial asymmetry. • She was again admitted February 2023 with left facial droop and bilateral upper extremity paresthesias with negative stroke work-up that admission as well. • Carotid stenosis medically managed. IIH (idiopathic intracranial hypertension)  Assessment & Plan  • Patient follows with neurology as outpatient, suspected to be exacerbated by untreated CAMILLE. Patient was referred for outpatient sleep study.   • Continue home Diamox      Chronic migraine w/o aura w/o status migrainosus, not intractable  Assessment & Plan  • Follows with [facility] neurology in [facility] SPECIAL[facility]. [facility] headache center for chronic migraines  • Was started on Diamox, patient was concerned about side effects and decreased to 1 tab daily  • As needed Decadron    COPD (chronic obstructive pulmonary disease) ([address])  Assessment & Plan  · Without acute exacerbation  · Home regimen with Breo Ellipta, Singulair  · Restart today  · Encourage IS use    Essential hypertension  Assessment & Plan  · Home regimen with Cardizem  mg, metoprolol succinate 100 mg twice daily, lasix  · Currently normotensive    Lactic acidosis-resolved as of 7/20/2023  Assessment & Plan  Lab Results Component Value Date    LACTICACID 1.7 07/20/2023    LACTICACID 2.8 (HH) 07/19/2023    LACTICACID 1.4 07/19/2023    LACTICACID 3.0 () 07/19/2023    LACTICACID 4.7 () 07/19/2023    LACTICACID 4.0 ([address]) 07/19/2023     · On admission patient was noted to have elevated lactic acid suspected to be related to colitis. Patient was admitted to medical service with unresolved lactic acidosis and abdominal pain. Patient was evaluated by surgery and found to have ischemic bowel. · She received volume resuscitation with improvement of lactic acid down to 1.4, and was taken to the operating room by general surgery. · Patient admitted to stepdown level 1 postoperatively  · POD #1 s/p ex lap with bowel resection, lysis of adhesions, laparoscopic takedown of left colon, resection of transverse colon, splenic flexure, and descending colon, sigmoidoscopy, mobilization of right colon. · Postop lactic acid 2.8, improved down to 1.7 with IV fluids. · No further need to trend         History of Present Illness     HPI: Blanka Fulton is a 70 y.o. who presents with past medical history A-fib on Eliquis, anxiety, asthma, chronic back pain, GERD, hypertension, hyperlipidemia, chronic migraines. On admission she was complaining of constipation x5 days. Initial CT imaging revealed colitis. Labs revealing lactic acidosis. She was admitted to the medicine service for further treatment. Patient with persistent lactic acidosis, and persistent abdominal pain CT with contrast obtained 7/19 with concern for ischemic bowel. General surgery was consulted, patient was volume resuscitated, Eliquis reversed with Kcentra, and patient was taken to the operating room. She is now POD #1 s/p ex lap bowel resection, ANNABELLA, laparoscopic takedown of left colon, resection of transverse colon, splenic flexure, and descending colon, sigmoidoscopy, mobilization of right colon.   Patient received Exparel intraoperatively and was started on Dilaudid PCA for pain control by general surgery. Postop patient was extubated, she is currently hemodynamically stable, and was admitted to stepdown level 1 with medical critical care for further resuscitation and work-up. History obtained from chart review and the patient. Review of Systems   Constitutional: Negative for chills, fatigue and fever. HENT: Negative. Eyes: Negative. Respiratory: Negative for cough, chest tightness, shortness of breath and wheezing. Cardiovascular: Negative for chest pain, palpitations and leg swelling. Gastrointestinal: Positive for abdominal distention, abdominal pain, blood in stool and constipation. Negative for diarrhea, nausea and vomiting. Endocrine: Negative. Genitourinary: Negative for decreased urine volume, difficulty urinating, frequency and hematuria. Musculoskeletal: Negative for arthralgias and myalgias. Skin: Negative for pallor, rash and wound. Allergic/Immunologic: Negative. Neurological: Positive for headaches (Chronic migraine). Negative for dizziness, syncope, weakness and light-headedness. Hematological: Negative. Psychiatric/Behavioral: Negative. 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"  No date: History of pneumonia  No date: Hives  No date: Hyperlipidemia  No date: Hypertension  No date: Infertility, female  No date: Interstitial cystitis  No date: Migraine      Comment:  sees neurologist  No date: Motion sickness  No date: Palpitations  No date: PONV (postoperative nausea and vomiting)  11/9/2022: Premature atrial contractions  No date: Psychiatric disorder  09/2022: TIA (transient ischemic attack)      Comment:  per pt --"had MRI and saw Carotid artery Left was                blocked"  No date: Urinary incontinence      Comment:  wears Pads  8/1/2017: Venous insufficiency  No date: Wears glasses Past Surgical History:  No date: COLONOSCOPY  No date: DILATION AND CURETTAGE OF UTERUS  No date: EGD  No date: EXPLORATORY LAPAROTOMY      Comment:  for infertility  No date: HAND SURGERY      Comment:  Hand excision of tendon cyst  05/03/2022: [place] LAPAROSCOPIC APPENDECTOMY; N/A      Comment:  Procedure: APPENDECTOMY LAPAROSCOPIC;  Surgeon: Laura Blank MD;  Location:  MAIN OR;  Service: General  1/31/2023: [place] TEAEC W/PATCH GRF CAROTID VERTB SUBCLAV NECK INC; Left      Comment:  Procedure: EXPLORATION OF LEFT CAROTID ARTERY; ABORTED                ENDARTERECTOMY ARTERY CAROTID;  Surgeon: Boo Johns DO;  Location: [facility] Main OR;  Service: Vascular  No date: [place]  No date: WISDOM TOOTH EXTRACTION   Current Outpatient Medications   Medication Instructions   • acetaZOLAMIDE (DIAMOX) 125 mg tablet Take 1 tab PO in a.m. and in p.m. for 1 week, then 1 tab in a.m. and 2 tabs in p.m. for 1 week, then 2 tabs in a.m. and 2 tabs in p.m. for 1 week, then 2 tabs in a.m. and 3 tabs in p.m. for 1 week, then 3 tabs in a.m. and 3 tabs in p.m. for 1 week, then 3 tabs in a.m. and 4 tabs in p.m. for 1 week, then 4 tabs in a.m. and 4 tabs in p.m. and continue. • aspirin 81 mg, Oral, Daily   • Biotin w/ Vitamins C & E (HAIR/SKIN/NAILS PO) Oral   • Cholecalciferol 1,000 Units, Oral, Daily   • clotrimazole-betamethasone (LOTRISONE) 1-0.05 % cream No dose, route, or frequency recorded.    • dexamethasone (DECADRON) 4 mg tablet 1 tab PO with breakfast for 1 to 5 days for unrelenting migraine   • diltiazem (CARDIZEM SR) 120 mg, Oral, Daily   • Docusate Sodium (COLACE PO) Daily at bedtime   • Eliquis 5 MG TAKE ONE TABLET BY MOUTH TWICE DAILY   • famotidine (PEPCID) 20 mg, Oral, Daily at bedtime   • fexofenadine (ALLEGRA) 180 mg, Oral, Daily   • fluticasone-vilanterol (BREO ELLIPTA) 200-25 MCG/INH inhaler 1 puff, Inhalation, Daily, Rinse mouth after use. • furosemide (LASIX) 40 mg, Oral, Daily   • hydrOXYzine HCL (ATARAX) 25 mg tablet TAKE TWO TABLETS BY MOUTH DAILY AT BEDTIME   • LORazepam (ATIVAN) 1 mg, Oral, Every 6 hours PRN   • metoprolol succinate (TOPROL-XL) 100 mg, Oral, 2 times daily   • montelukast (SINGULAIR) 10 mg tablet TAKE ONE TABLET BY MOUTH DAILY AT BEDTIME   • MULTIPLE VITAMIN PO Oral   • omeprazole (PRILOSEC) 40 mg, Oral, 2 times daily before meals   • ondansetron (ZOFRAN ODT) 4 mg, Oral, Every 6 hours PRN   • phenazopyridine (PYRIDIUM) 200 mg, Oral, 3 times daily PRN, Not to be used for more than 2 consecutive days. • Premarin 0.5 g, Vaginal, Every other day, Do not use applicator-1 fingertip dab to the urethra Monday Wednesdays and Fridays   • Probiotic Product (PROBIOTIC-10 PO) 1 tablet, Daily   • promethazine (PHENERGAN) 25 mg, Oral, Every 6 hours PRN   • Repatha SureClick 851 MG/ML SOAJ Inject 140mg under the skin every 2 weeks. • spironolactone (ALDACTONE) 25 mg tablet TAKE ONE TABLET BY MOUTH TWICE DAILY   • sucralfate (CARAFATE) 1 g, Oral, 2 times daily before meals   • traMADol (ULTRAM) 100 mg, Oral, Every 8 hours PRN   • vitamin C 1,000 mg, Oral, Daily   • Zinc 100 MG TABS Daily    Allergies   Allergen Reactions   • Ace Inhibitors Angioedema and Anaphylaxis     Anaphylaxis   • Benicar [Olmesartan] Angioedema     See Allergy note from 9/11/2008. Swollen ankles/legs   • Hydralazine Other (See Comments)     Lip swelling  Flank pain   • Other Anaphylaxis and Other (See Comments)     Other reaction(s): angioadema  Other reaction(s):  Other (See Comments)  Preservatives- itching throat closes, hi  Other reaction(s): angioadema  E Z Cat - hives throat closes itching,  Preservatives- itching throat closes, hi  Artificial sweeteners   • Valsartan Angioedema     Lips, face swollen   • Sulfa Antibiotics Hives     stuffiness,itching,hives,throat closing--per pt "sometimes able to take depending on the brand and the filler or possibly preservatives in it"   • Ampicillin Hives     Depends on brand some preservatives can react. Has recently tolerated oral amoxicillin.    • Motrin [Ibuprofen] Other (See Comments)     Per pt " told not to take due to A Fib"   • Wound Dressing Adhesive Other (See Comments)     Per pt Adhesive on EKG leads caused redness      Social History     Tobacco Use   • Smoking status: Former     Packs/day: 0.50     Years: 10.00     Total pack years: 5.00     Types: Cigarettes     Quit date:      Years since quittin.5     Passive exposure: Never   • Smokeless tobacco: Never   Vaping Use   • Vaping Use: Never used   Substance Use Topics   • Alcohol use: Never   • Drug use: No    Family History   Problem Relation Age of Onset   • Lung cancer Mother 61   • Brain cancer Mother 61   • Diabetes Father    • Depression Father    • Other Father         septic   • Allergies Sister    • Hashimoto's thyroiditis Sister    • Abdominal aortic aneurysm Sister    • Diabetes Sister    • No Known Problems Sister    • No Known Problems Maternal Grandmother    • No Known Problems Maternal Grandfather    • No Known Problems Paternal Grandmother    • No Known Problems Paternal Grandfather    • Hodgkin's lymphoma Brother    • No Known Problems Brother    • No Known Problems Maternal Aunt    • Diabetes Other         Objective                            Vitals I/O      Most Recent Min/Max in 24hrs   Temp 97.7 °F (36.5 °C) Temp  Min: 97.7 °F (36.5 °C)  Max: 99.6 °F (37.6 °C)   Pulse 74 Pulse  Min: 74  Max: 107   Resp 15 Resp  Min: 15  Max: 22   /62 BP  Min: 117/41  Max: 170/80   O2 Sat 97 % SpO2  Min: 92 %  Max: 99 %      Intake/Output Summary (Last 24 hours) at 7/20/2023 0515  Last data filed at 7/20/2023 0100  Gross per 24 hour   Intake 3942.09 ml   Output 1520 ml   Net 2422.09 ml         Diet NPO; Sips with meds     Invasive Monitoring Physical exam   Arterial Line  Marixa /68  Arterial Line BP  Min: 154/76  Max: 174/80    mmHg  Arterial Line MAP (mmHg)  Min: 102 mmHg  Max: 114 mmHg    Physical Exam  Vitals and nursing note reviewed. Constitutional:       General: She is not in acute distress. Appearance: She is obese. She is ill-appearing. She is not toxic-appearing or diaphoretic. Interventions: Nasal cannula in place. HENT:      Head: Normocephalic and atraumatic. Right Ear: External ear normal.      Left Ear: External ear normal.      Nose: Nose normal. No congestion or rhinorrhea. Mouth/Throat:      Mouth: Mucous membranes are moist.      Pharynx: Oropharynx is clear. No oropharyngeal exudate or posterior oropharyngeal erythema. Eyes:      General: No scleral icterus. Extraocular Movements: Extraocular movements intact. Conjunctiva/sclera: Conjunctivae normal.      Pupils: Pupils are equal, round, and reactive to light. Neck:      Vascular: No carotid bruit. Cardiovascular:      Rate and Rhythm: Normal rate and regular rhythm. Pulses: Normal pulses. Heart sounds: Normal heart sounds. No murmur heard. No friction rub. No gallop. Pulmonary:      Effort: Pulmonary effort is normal. No tachypnea, accessory muscle usage or respiratory distress. Breath sounds: Decreased breath sounds present. No wheezing, rhonchi or rales. Abdominal:      General: Abdomen is protuberant. There is distension. Palpations: Abdomen is soft. Tenderness: There is abdominal tenderness (Appropriately tender). Musculoskeletal:         General: Normal range of motion.       Cervical back: Normal range of motion and neck supple. Right lower leg: No edema. Left lower leg: No edema. Lymphadenopathy:      Cervical: No cervical adenopathy. Skin:     General: Skin is warm and dry. Capillary Refill: Capillary refill takes less than 2 seconds. Coloration: Skin is pale. Findings: No bruising, erythema or rash. Neurological:      General: No focal deficit present. Mental Status: She is alert and oriented to person, place, and time. Cranial Nerves: No cranial nerve deficit. Motor: No weakness. Psychiatric:         Mood and Affect: Mood normal.         Behavior: Behavior normal. Behavior is cooperative. Thought Content: Thought content normal.         Judgment: Judgment normal.              Diagnostic Studies      EKG: Atrial fibrillation, rate controlled. Imagin/18 CT abdomen pelvis: Colitis   CT with contrast abdomen pelvis: Colitis with concern for ischemic bowel   I have personally reviewed pertinent reports.    and I have personally reviewed pertinent films in PACS     Medications:  Scheduled PRN   cefTRIAXone, 1,000 mg, Q24H  chlorhexidine, 15 mL, Q12H BONIFACIO  diltiazem, 120 mg, Daily  famotidine, 20 mg, HS  heparin (porcine), 5,000 Units, Q8H [address]  magnesium sulfate, 2 g, Once  metoprolol succinate, 100 mg, BID  metroNIDAZOLE, 500 mg, Q8H  norepinephrine 4 mg in 0.9% sodium chloride 250 mL, ,   potassium chloride, 20 mEq, Q2H      aluminum-magnesium hydroxide-simethicone, 30 mL, Q6H PRN  naloxone, 0.04 mg, Q3 min PRN  norepinephrine 4 mg in 0.9% sodium chloride 250 mL, ,   ondansetron, 4 mg, Q6H PRN  polyethylene glycol, 17 g, Daily PRN       Continuous    HYDROmorphone,   multi-electrolyte, 125 mL/hr, Last Rate: 125 mL/hr (23 0653)         Labs:    CBC    Recent Labs     23  0416   WBC 14.85* 15.73*   HGB 13.1 12.3   HCT 39.4 37.4    177     BMP    Recent Labs     23  0416   SODIUM 139 139   K 3.6 3.3* * 112*   CO2 18* 18*   AGAP 10 9   BUN 13 13   CREATININE 0.70 0.70   CALCIUM 7.5* 7.6*       Coags    Recent Labs     07/18/23  2244   INR 1.11   PTT 33        Additional Electrolytes  Recent Labs     07/19/23  2347   MG 1.7*   PHOS 2.1*          Blood Gas    Recent Labs     07/19/23  2347   PHART 7.327*   SPB9BOW 31.6*   PO2ART 84.2   LUL9RRV 16.2*   BEART -8.6   SOURCE Line, Arterial     Recent Labs     07/19/23  2347   SOURCE Line, Arterial    LFTs  Recent Labs     07/18/23  2244   ALT 14   AST 17   ALKPHOS 118*   ALB 4.7   TBILI 0.47       Infectious  Recent Labs     07/19/23  0728   PROCALCITONI 0.42*     Glucose  Recent Labs     07/18/23  2244 07/19/23  0728 07/19/23  2347 07/20/23  0416   GLUC 149* 185* 177* 171*             Critical Care Time Delivered: Upon my evaluation, this patient had a high probability of imminent or life-threatening deterioration due to Bowel ischemia now status post ex lap, lactic acidosis, which required my direct attention, intervention, and personal management. I have personally provided 45 minutes of critical care time, exclusive of procedures, teaching, family meetings, and any prior time recorded by providers other than myself.    Santosh Brown

## 2023-07-20 NOTE — ASSESSMENT & PLAN NOTE
· Without acute exacerbation  · Home regimen with Breke Ellipta Singulair  · Restart today  · Encourage IS use

## 2023-07-20 NOTE — PROGRESS NOTES
Progress Note- General Surgery  Dorina De La Rosa 70 y.o. female MRN: [id number]  Unit/Bed#: ICU 05 Encounter: [id number]    Assessment:  70 F w/ pmh of HTN, HLD, afib, CAD, COPD, SMA thrombosis s/p stent presents with abdominal pain, hematochezia and c/f ischemic colitis. Now s/p diagnostic lap converted to ex lap with extended left hemicolectomy and primary anastomosis 7/19    Plan:  • NGT/NPO- okay for ice chips  • IVF  • IS  • OOB  • abx  • PT/OT  • Okay to come out of unit  • Please TigerText on call [facility] surgery resident or AP with any questions     Subjective/Objective     Subjective:   No acute events overnight. No bowel function. Pain overnight but improved with PCA. Used 4 times. Pertinent review of systems as above. All other review of systems negative. Objective:    Blood pressure 142/57, pulse 86, temperature 98.9 °F (37.2 °C), temperature source Oral, resp. rate 17, height 5' 4" (1.626 m), weight 86.2 kg (190 lb 0.6 oz), SpO2 96 %, not currently breastfeeding. ,Body mass index is 32.62 kg/m². Intake/Output Summary (Last 24 hours) at 7/20/2023 0749  Last data filed at 7/20/2023 0729  Gross per 24 hour   Intake 4893.29 ml   Output 1820 ml   Net 3073.29 ml       Invasive Devices     Peripheral Intravenous Line  Duration           Peripheral IV 07/19/23 Dorsal (posterior); Right Wrist <1 day    Peripheral IV 07/19/23 Left Hand <1 day          Arterial Line  Duration           Arterial Line 07/19/23 Left Radial <1 day          Drain  Duration           NG/OG/Enteral Tube Nasogastric Left nare <1 day    Urethral Catheter 16 Fr. <1 day                Physical Exam:   Gen:  NAD. HEENT: NCAT. MMM  CV: well perfused  Lungs: Normal respiratory effort  Abd: soft, nt/nd,incisions cdi  Skin: warm/ dry  Extremities: no peripheral edema, no clubbing or cyanosis  Neuro:  AxO x3      Results from last 7 days   Lab Units 07/20/23  0416 07/19/23  2347 07/19/23  0728   WBC Thousand/uL 15.73* 14.85* 27.90* HEMOGLOBIN g/dL 12.3 13.1 15.4   HEMATOCRIT % 37.4 39.4 45.5   PLATELETS Thousands/uL 177 220 255     Results from last 7 days   Lab Units 07/20/23  0416 07/19/23  2347 07/19/23  0728   POTASSIUM mmol/L 3.3* 3.6 3.6   CHLORIDE mmol/L 112* 111* 108   CO2 mmol/L 18* 18* 18*   BUN mg/dL 13 13 18   CREATININE mg/dL 0.70 0.70 0.97   CALCIUM mg/dL 7.6* 7.5* 9.3     Results from last 7 days   Lab Units 07/18/23  2244   INR  1.11   PTT seconds 33        I have personally reviewed pertinent films in PACS.   VTE Pharmacologic Prophylaxis: Heparin  VTE Mechanical Prophylaxis: sequential compression device

## 2023-07-21 LAB
APTT PPP: 34 SECONDS (ref 23–37)
APTT PPP: 39 SECONDS (ref 23–37)
ERYTHROCYTE [DISTWIDTH] IN BLOOD BY AUTOMATED COUNT: 13.7 % (ref 11.6–15.1)
HCT VFR BLD AUTO: 30.9 % (ref 34.8–46.1)
HGB BLD-MCNC: 10.3 G/DL (ref 11.5–15.4)
INR PPP: 1.49 (ref 0.84–1.19)
MCH RBC QN AUTO: 31 PG (ref 26.8–34.3)
MCHC RBC AUTO-ENTMCNC: 33.3 G/DL (ref 31.4–37.4)
MCV RBC AUTO: 93 FL (ref 82–98)
PLATELET # BLD AUTO: 145 THOUSANDS/UL (ref 149–390)
PMV BLD AUTO: 9.2 FL (ref 8.9–12.7)
PROTHROMBIN TIME: 18 SECONDS (ref 11.6–14.5)
RBC # BLD AUTO: 3.32 MILLION/UL (ref 3.81–5.12)
WBC # BLD AUTO: 12.1 THOUSAND/UL (ref 4.31–10.16)

## 2023-07-21 PROCEDURE — 97167 OT EVAL HIGH COMPLEX 60 MIN: CPT

## 2023-07-21 PROCEDURE — 85730 THROMBOPLASTIN TIME PARTIAL: CPT | Performed by: SURGERY

## 2023-07-21 PROCEDURE — 85610 PROTHROMBIN TIME: CPT | Performed by: NURSE PRACTITIONER

## 2023-07-21 PROCEDURE — 99223 1ST HOSP IP/OBS HIGH 75: CPT | Performed by: INTERNAL MEDICINE

## 2023-07-21 PROCEDURE — 99232 SBSQ HOSP IP/OBS MODERATE 35: CPT | Performed by: INTERNAL MEDICINE

## 2023-07-21 PROCEDURE — 99024 POSTOP FOLLOW-UP VISIT: CPT | Performed by: SURGERY

## 2023-07-21 PROCEDURE — 97163 PT EVAL HIGH COMPLEX 45 MIN: CPT

## 2023-07-21 PROCEDURE — 85027 COMPLETE CBC AUTOMATED: CPT | Performed by: NURSE PRACTITIONER

## 2023-07-21 PROCEDURE — 85730 THROMBOPLASTIN TIME PARTIAL: CPT | Performed by: NURSE PRACTITIONER

## 2023-07-21 RX ORDER — METOPROLOL TARTRATE 5 MG/5ML
10 INJECTION INTRAVENOUS EVERY 6 HOURS PRN
Status: DISCONTINUED | OUTPATIENT
Start: 2023-07-21 | End: 2023-07-28 | Stop reason: HOSPADM

## 2023-07-21 RX ORDER — HYDROMORPHONE HCL/PF 1 MG/ML
0.5 SYRINGE (ML) INJECTION EVERY 4 HOURS PRN
Status: DISCONTINUED | OUTPATIENT
Start: 2023-07-21 | End: 2023-07-21

## 2023-07-21 RX ORDER — METOPROLOL TARTRATE 100 MG/1
100 TABLET ORAL EVERY 12 HOURS SCHEDULED
Status: DISCONTINUED | OUTPATIENT
Start: 2023-07-21 | End: 2023-07-28 | Stop reason: HOSPADM

## 2023-07-21 RX ORDER — METOPROLOL TARTRATE 100 MG/1
100 TABLET ORAL EVERY 12 HOURS SCHEDULED
Status: DISCONTINUED | OUTPATIENT
Start: 2023-07-22 | End: 2023-07-21

## 2023-07-21 RX ORDER — METOPROLOL TARTRATE 5 MG/5ML
5 INJECTION INTRAVENOUS EVERY 6 HOURS PRN
Status: DISCONTINUED | OUTPATIENT
Start: 2023-07-21 | End: 2023-07-21

## 2023-07-21 RX ORDER — DILTIAZEM HYDROCHLORIDE 5 MG/ML
15 INJECTION INTRAVENOUS EVERY 8 HOURS PRN
Status: DISCONTINUED | OUTPATIENT
Start: 2023-07-21 | End: 2023-07-22

## 2023-07-21 RX ORDER — HEPARIN SODIUM 1000 [USP'U]/ML
2000 INJECTION, SOLUTION INTRAVENOUS; SUBCUTANEOUS EVERY 6 HOURS PRN
Status: DISCONTINUED | OUTPATIENT
Start: 2023-07-21 | End: 2023-07-25

## 2023-07-21 RX ORDER — HEPARIN SODIUM 1000 [USP'U]/ML
4000 INJECTION, SOLUTION INTRAVENOUS; SUBCUTANEOUS EVERY 6 HOURS PRN
Status: DISCONTINUED | OUTPATIENT
Start: 2023-07-21 | End: 2023-07-25

## 2023-07-21 RX ORDER — METOPROLOL TARTRATE 50 MG/1
50 TABLET, FILM COATED ORAL EVERY 12 HOURS SCHEDULED
Status: DISCONTINUED | OUTPATIENT
Start: 2023-07-21 | End: 2023-07-21

## 2023-07-21 RX ORDER — OXYCODONE HYDROCHLORIDE 5 MG/1
5 TABLET ORAL EVERY 4 HOURS PRN
Status: DISCONTINUED | OUTPATIENT
Start: 2023-07-21 | End: 2023-07-21

## 2023-07-21 RX ORDER — METOPROLOL TARTRATE 5 MG/5ML
5 INJECTION INTRAVENOUS ONCE
Status: DISCONTINUED | OUTPATIENT
Start: 2023-07-21 | End: 2023-07-21

## 2023-07-21 RX ORDER — DILTIAZEM HYDROCHLORIDE 5 MG/ML
15 INJECTION INTRAVENOUS ONCE
Status: COMPLETED | OUTPATIENT
Start: 2023-07-21 | End: 2023-07-21

## 2023-07-21 RX ORDER — FLUTICASONE FUROATE AND VILANTEROL 200; 25 UG/1; UG/1
1 POWDER RESPIRATORY (INHALATION) DAILY
Status: DISCONTINUED | OUTPATIENT
Start: 2023-07-21 | End: 2023-07-28 | Stop reason: HOSPADM

## 2023-07-21 RX ORDER — HEPARIN SODIUM 1000 [USP'U]/ML
4000 INJECTION, SOLUTION INTRAVENOUS; SUBCUTANEOUS ONCE
Status: COMPLETED | OUTPATIENT
Start: 2023-07-21 | End: 2023-07-21

## 2023-07-21 RX ORDER — HEPARIN SODIUM 10000 [USP'U]/100ML
3-20 INJECTION, SOLUTION INTRAVENOUS
Status: DISCONTINUED | OUTPATIENT
Start: 2023-07-21 | End: 2023-07-25

## 2023-07-21 RX ORDER — METOPROLOL TARTRATE 5 MG/5ML
10 INJECTION INTRAVENOUS EVERY 6 HOURS PRN
Status: DISCONTINUED | OUTPATIENT
Start: 2023-07-21 | End: 2023-07-21

## 2023-07-21 RX ADMIN — MORPHINE SULFATE 2 MG: 2 INJECTION, SOLUTION INTRAMUSCULAR; INTRAVENOUS at 11:04

## 2023-07-21 RX ADMIN — CEFTRIAXONE 1000 MG: 1 INJECTION, SOLUTION INTRAVENOUS at 06:36

## 2023-07-21 RX ADMIN — OXYCODONE HYDROCHLORIDE 5 MG: 5 TABLET ORAL at 14:12

## 2023-07-21 RX ADMIN — FAMOTIDINE 20 MG: 10 INJECTION INTRAVENOUS at 21:19

## 2023-07-21 RX ADMIN — ACETAMINOPHEN 325MG 650 MG: 325 TABLET ORAL at 06:33

## 2023-07-21 RX ADMIN — ACETAMINOPHEN 325MG 650 MG: 325 TABLET ORAL at 11:03

## 2023-07-21 RX ADMIN — METHOCARBAMOL 500 MG: 500 TABLET ORAL at 11:03

## 2023-07-21 RX ADMIN — SUCRALFATE 1 G: 1 TABLET ORAL at 11:03

## 2023-07-21 RX ADMIN — HEPARIN SODIUM 11.8 UNITS/KG/HR: 10000 INJECTION, SOLUTION INTRAVENOUS at 06:36

## 2023-07-21 RX ADMIN — HEPARIN SODIUM 4000 UNITS: 1000 INJECTION INTRAVENOUS; SUBCUTANEOUS at 15:36

## 2023-07-21 RX ADMIN — SUCRALFATE 1 G: 1 TABLET ORAL at 17:48

## 2023-07-21 RX ADMIN — OXYCODONE HYDROCHLORIDE 5 MG: 5 TABLET ORAL at 07:05

## 2023-07-21 RX ADMIN — ONDANSETRON 4 MG: 2 INJECTION INTRAMUSCULAR; INTRAVENOUS at 17:53

## 2023-07-21 RX ADMIN — ACETAMINOPHEN 325MG 650 MG: 325 TABLET ORAL at 17:48

## 2023-07-21 RX ADMIN — DILTIAZEM HYDROCHLORIDE 15 MG: 5 INJECTION INTRAVENOUS at 08:17

## 2023-07-21 RX ADMIN — METOPROLOL TARTRATE 50 MG: 50 TABLET, FILM COATED ORAL at 14:44

## 2023-07-21 RX ADMIN — METOROPROLOL TARTRATE 10 MG: 5 INJECTION, SOLUTION INTRAVENOUS at 14:43

## 2023-07-21 RX ADMIN — METOROPROLOL TARTRATE 10 MG: 5 INJECTION, SOLUTION INTRAVENOUS at 06:33

## 2023-07-21 RX ADMIN — METHOCARBAMOL 500 MG: 500 TABLET ORAL at 17:48

## 2023-07-21 RX ADMIN — MORPHINE SULFATE 2 MG: 2 INJECTION, SOLUTION INTRAMUSCULAR; INTRAVENOUS at 08:17

## 2023-07-21 RX ADMIN — ONDANSETRON 4 MG: 2 INJECTION INTRAMUSCULAR; INTRAVENOUS at 07:05

## 2023-07-21 RX ADMIN — METHOCARBAMOL 500 MG: 500 TABLET ORAL at 06:33

## 2023-07-21 RX ADMIN — MORPHINE SULFATE 2 MG: 2 INJECTION, SOLUTION INTRAMUSCULAR; INTRAVENOUS at 15:09

## 2023-07-21 RX ADMIN — METOPROLOL TARTRATE 100 MG: 100 TABLET, FILM COATED ORAL at 21:20

## 2023-07-21 RX ADMIN — MORPHINE SULFATE 2 MG: 2 INJECTION, SOLUTION INTRAMUSCULAR; INTRAVENOUS at 00:33

## 2023-07-21 RX ADMIN — MORPHINE SULFATE 2 MG: 2 INJECTION, SOLUTION INTRAMUSCULAR; INTRAVENOUS at 18:02

## 2023-07-21 RX ADMIN — FLUTICASONE FUROATE AND VILANTEROL TRIFENATATE 1 PUFF: 200; 25 POWDER RESPIRATORY (INHALATION) at 11:03

## 2023-07-21 RX ADMIN — DILTIAZEM HYDROCHLORIDE 15 MG: 5 INJECTION INTRAVENOUS at 04:58

## 2023-07-21 RX ADMIN — HEPARIN SODIUM 4000 UNITS: 1000 INJECTION INTRAVENOUS; SUBCUTANEOUS at 06:35

## 2023-07-21 RX ADMIN — CHLORHEXIDINE GLUCONATE 15 ML: 1.2 RINSE ORAL at 21:19

## 2023-07-21 RX ADMIN — CHLORHEXIDINE GLUCONATE 15 ML: 1.2 RINSE ORAL at 08:16

## 2023-07-21 RX ADMIN — MORPHINE SULFATE 2 MG: 2 INJECTION, SOLUTION INTRAMUSCULAR; INTRAVENOUS at 04:15

## 2023-07-21 RX ADMIN — SUCRALFATE 1 G: 1 TABLET ORAL at 21:19

## 2023-07-21 RX ADMIN — SUCRALFATE 1 G: 1 TABLET ORAL at 06:33

## 2023-07-21 RX ADMIN — OXYCODONE HYDROCHLORIDE 5 MG: 5 TABLET ORAL at 02:32

## 2023-07-21 NOTE — ASSESSMENT & PLAN NOTE
· Was on Eliquis preoperatively, received Kcentra for reversal  · PTA Rate control with diltiazem and metoprolol  · Developed Afib RVR POD #1  · Currently atrial fibrillation/atrial flutter on telemetry    Plan:   · Resume AC today per surgery   · Plan to resume home p.o. rate control meds  · Continue metoprolol IV 10 mg every 6 hours  · Continue Cardizem IV 15 mg twice daily  · If unable to maintain rate control will start Cardizem infusion

## 2023-07-21 NOTE — PLAN OF CARE
Problem: OCCUPATIONAL THERAPY ADULT  Goal: Performs self-care activities at highest level of function for planned discharge setting. See evaluation for individualized goals. Description: Treatment Interventions: ADL retraining, Functional transfer training, Endurance training, Patient/family training, Compensatory technique education, Energy conservation, Activityengagement, Equipment evaluation/education          See flowsheet documentation for full assessment, interventions and recommendations. Note: Limitation: Decreased ADL status, Decreased self-care trans, Decreased high-level ADLs, Decreased endurance  Prognosis: Good  Assessment: Pt is a 70 y.o. female seen for OT evaluation s/p [facility] on 7/18[facility]2023 due to  Colonic ischemia ([address] Personal and env factors supporting pt at time of IE include age, (I) PLOF and accessible home environment. Personal and env factors inhibiting engagement in occupations include limited social support, difficulty completing ADLs and difficulty completing IADLs. Performance deficits that affect the pt’s occupational performance can be seen above. Due to pt's current functional limitations and medical complications pt is functioning below baseline. Pt would benefit from continued skilled OT treatment in order to maximize safety, independence and overall performance with ADLs, IADLs, functional mobility and functional transfers in order to achieve highest level of function.      OT Discharge Recommendation: Post acute rehabilitation services (vs HHOT, potential for improvement w/time and pain control)

## 2023-07-21 NOTE — PLAN OF CARE
Problem: PHYSICAL THERAPY ADULT  Goal: Performs mobility at highest level of function for planned discharge setting. See evaluation for individualized goals. Description: Treatment/Interventions: Functional transfer training, LE strengthening/ROM, Elevations, Therapeutic exercise, Endurance training, Patient/family training, Equipment eval/education, Bed mobility, Gait training, Spoke to nursing, OT  Equipment Recommended: Johny Ricks (needs RW for use at home)       See flowsheet documentation for full assessment, interventions and recommendations. Note: Prognosis: Fair  Problem List: Decreased strength, Decreased endurance, Impaired balance, Decreased mobility, Impaired judgement, Decreased safety awareness, Obesity, Decreased skin integrity, Pain  Assessment: Pt is a 71 yo female admitted to [facility]* colitis and GIB, s/p lap ex with bowel resection, ANNABELLA. Pt lives alone in 1st floor apt with (+)XIOMARA, no use of personal DME for mobility PTA, was completely (I) with ADLs and IADLs PTA, reports no recent falls, (+)drive and reports A from neighbor as needed. Pt currently is not at functional mobility baseline, needs A for mobility, multiple lines, current use of 4 L NC O2,inc ABD pain, recent surgical procedure and currently receiving medical care in ICU with telemetry monitoring. Pt demonstrates moderate to minimal deficits during functional mobility including dec endurance, dec balance, dec BLE strength, ataxic and unsteady gait and movement pattern, fear of inc pain during mobility, (+)ABD pain and needs mod Ax1 for bed mobility and min Ax1 for TT and GT with use of RW.  Pt would cont to benefit from skilled inpt PT services to maximize functional independence and to dec caregiver burden upon being DC from hospital.  Barriers to Discharge: Decreased caregiver support, Inaccessible home environment ((+)XIOMARA and lives alone)     PT Discharge Recommendation: Post acute rehabilitation services    See flowsheet documentation for full assessment.

## 2023-07-21 NOTE — NURSING NOTE
Patient transferred to [facility] per ICU provider orders. Report called to [email or web address]. All questions answered. Patient verbalized having all belongings with her upon transfer.      Indy Rosario 7/21/2023 12:02 PM

## 2023-07-21 NOTE — ASSESSMENT & PLAN NOTE
· Patient was admitted under medicine with suspicion for colitis.   · Patient found to have ischemic colon  · See above

## 2023-07-21 NOTE — ASSESSMENT & PLAN NOTE
• Patient follows with neurology as outpatient, suspected to be exacerbated by untreated CAMILLE. Patient was referred for outpatient sleep study.   •

## 2023-07-21 NOTE — CONSULTS
Consultation Note - Cardiology   Marvin Lui 70 y.o. female MRN: [id number]  Unit/Bed#: ICU 13 Encounter: [id number]  07/21/23  11:00 AM    Encounter date: 7/21/2023  Patient name: Marvin Lui 70 y.o. female  Encounter providers/authors:   Medical Student: Jaylyn Mclean, 4th year medical student, [place]/[employer]   Cardiology Attending Physician: Ashish Gusman MD  Inpatient attending physician: Becka Adams MD  Primary care provider: Piper Taylor DO  Date of admission: 7/18/2023      Assessment/ Plan:  Colonic ischemia  Hematochezia  Colitis  L carotid artery stenosis  Idiopathic intracranial hypertension   Chronic migraine w/o aura  COPD  HTN  Paroxysmal Afib  PTA Rate control with diltiazem and metoprolol. AC on Eliquis  Developed Afib RVR POD #1. Currently atrial fibrillation w/ RVR  Telemetry showed temporary conversion to sinus rhythm when rate is controlled  Eliquis was held 2/2 surgery. Pt started on heparin drip per surgery  Continue Lopressor 50 mg BID. Up titrate to home dose of metoprolol 100 mg BID. Can restart home lasix. Pt endorsing extremity edema. Mild crackles on PE. Check BNP tomorrow am.  If high, and still SOB/crackles on exam, may give IV lasix tomorrow. Continue PRN IV lopressor and IV diltiazem        HPI: Marvin Lui is a 70y.o. year old female with history of HTN, HLD, afib on Eliquis, CAD, COPD, SMA thrombosis s/p stent, L ICA stenosis s/p unsuccessful CEA presented due to BRBPR. Pt reports she had abdominal pain, and constipation for 5 days followed by an episode of hematochezia prompting her to come to the ED. Workup was concerning for ischemic colitis. Patient is s/p diagnostic lap converted to ex lap with extended left hemicolectomy and primary anastomosis on 7/19. Pt developed Afib w/ RVR POD#1. Cardiology was consulted. Pt follows w/cardiology outpatient (last seen 6/29/2023). At that time EKG showed some PACs but HR was controlled on on metoprolol and diltiazem.    Review of Systems   Constitutional: Negative for chills, fatigue and fever. HENT: Negative. Eyes: Negative. Negative for redness and visual disturbance. Respiratory: Positive for shortness of breath. Negative for cough, chest tightness and wheezing. Cardiovascular: Positive for palpitations. Negative for chest pain and leg swelling. Gastrointestinal: Positive for abdominal pain. Negative for abdominal distention, nausea and vomiting. Endocrine: Negative. Genitourinary: Negative for decreased urine volume, difficulty urinating, frequency and hematuria. Musculoskeletal: Negative for arthralgias and myalgias. Skin: Negative for pallor, rash and wound. Allergic/Immunologic: Negative. Neurological: Negative for dizziness, seizures, weakness and light-headedness. Hematological: Negative.         Historical Information   Past Medical History:   Diagnosis Date   • A-fib ([address]) 03/2020   • Allergic    • Anxiety    • Arthritis    • Asthma    • Back pain     and muscle pain   • Bruises easily    • Chronic pain disorder     Interstitial pain   • Colon polyp    • Dental bridge present    • Depression    • Eczema    • GERD (gastroesophageal reflux disease)    • Heart murmur    • History of blood clots     per pt "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"   • History of pneumonia    • Hives    • Hyperlipidemia    • Hypertension    • Infertility, female    • Interstitial cystitis    • Migraine     sees neurologist   • Motion sickness    • Palpitations    • PONV (postoperative nausea and vomiting)    • Premature atrial contractions 11/9/2022   • Psychiatric disorder    • TIA (transient ischemic attack) 09/2022    per pt --"had MRI and 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"   • Urinary incontinence     wears Pads   • Venous insufficiency 8/1/2017   • Wears glasses      Past Surgical History:   Procedure Laterality Date   • COLONOSCOPY     • DILATION AND CURETTAGE OF UTERUS     • EGD     • EXPLORATORY LAPAROTOMY      for infertility   • EXPLORATORY LAPAROTOMY W/ BOWEL RESECTION N/A 2023    Procedure: LAPAROTOMY EXPLORATORY W/ BOWEL RESECTION;  Surgeon: Atanacio Bamberger, MD;  Location: [facility];  Service: General   • HAND SURGERY      Hand excision of tendon cyst   • AZ LAPAROSCOPIC APPENDECTOMY N/A 2022    Procedure: APPENDECTOMY LAPAROSCOPIC;  Surgeon:  Anushka Burrell MD;  Location: [facility];  Service: General   • AZ LAPS ABD PRTM&OMENTUM DX W/WO SPEC BR/WA SPX N/A 2023    Procedure: LAPAROSCOPY DIAGNOSTIC, LYSIS OF ADHESIONS, LAPAROSCOPY TAKE TAKEDOWN OF LEFT COLON, EXPLORATORY LAPAROSCOPY, LYSIS OF ADHESIONS, RESECTION OF TRANSVERSE COLON SPLENIC FLEXURE AND DESCENDING COLON , TAKE DOWN OF SPLENIC FLEXURE, SIGMOIDOSCOPY, MOBILIZATION OF RIGHT COLON, PRIMARY ANASTOMOSIS EEA 34, TAP BLOCK;  Surgeon: Atanacio Bamberger, MD;  Location: [facility];  Service: General   • AZ TEAEC W/PATCH GRF CAROTID VERTB [address] Left 2023    Procedure: EXPLORATION OF LEFT CAROTID ARTERY; ABORTED ENDARTERECTOMY ARTERY CAROTID;  Surgeon: Aristides Mejia DO;  Location: [facility];  Service: Vascular   • SUPERIOR MESTENTERIC ARTERY STENT     • WISDOM TOOTH EXTRACTION       Social History     Substance and Sexual Activity   Alcohol Use Never     Social History     Substance and Sexual Activity   Drug Use No     Social History     Tobacco Use   Smoking Status Former   • Packs/day: 0.50   • Years: 10.00   • Total pack years: 5.00   • Types: Cigarettes   • Quit date:    • Years since quittin.5   • Passive exposure: Never   Smokeless Tobacco Never       Family History:   Family History   Problem Relation Age of Onset   • Lung cancer Mother 61   • Brain cancer Mother 61   • Diabetes Father    • Depression Father    • Other Father         septic   • Allergies Sister    • Hashimoto's thyroiditis Sister    • Abdominal aortic aneurysm Sister    • Diabetes Sister    • No Known Problems Sister • No Known Problems Maternal Grandmother    • No Known Problems Maternal Grandfather    • No Known Problems Paternal Grandmother    • No Known Problems Paternal Grandfather    • Hodgkin's lymphoma Brother    • No Known Problems Brother    • No Known Problems Maternal Aunt    • Diabetes Other        Meds/Allergies   all current active meds have been reviewed  Allergies   Allergen Reactions   • Ace Inhibitors Angioedema and Anaphylaxis     Anaphylaxis   • Benicar [Olmesartan] Angioedema     See Allergy note from 9/11/2008. Swollen ankles/legs   • Hydralazine Other (See Comments)     Lip swelling  Flank pain   • Other Anaphylaxis and Other (See Comments)     Other reaction(s): angioadema  Other reaction(s): Other (See Comments)  Preservatives- itching throat closes, hi  Other reaction(s): angioadema  E Z Cat - hives throat closes itching,  Preservatives- itching throat closes, hi  Artificial sweeteners   • Valsartan Angioedema     Lips, face swollen   • Sulfa Antibiotics Hives     stuffiness,itching,hives,throat closing--per pt "sometimes able to take depending on the brand and the filler or possibly preservatives in it"   • Ampicillin Hives     Depends on brand some preservatives can react. Has recently tolerated oral amoxicillin. • Motrin [Ibuprofen] Other (See Comments)     Per pt " told not to take due to A Fib"   • Wound Dressing Adhesive Other (See Comments)     Per pt Adhesive on EKG leads caused redness       Objective   Vitals: Blood pressure 156/71, pulse (!) 124, temperature 98.6 °F (37 °C), temperature source Oral, resp.  rate (!) 24, height 5' 4" (1.626 m), weight 87.7 kg (193 lb 5.5 oz), SpO2 96 %, not currently breastfeeding., Body mass index is 33.19 kg/m².,   Orthostatic Blood Pressures    Flowsheet Row Most Recent Value   Blood Pressure 156/71 filed at 07/21/2023 0600   Patient Position - Orthostatic VS Lying filed at 07/21/2023 2579          Systolic (36VWC), MUX:089 , Min:112 , Max:156 Diastolic (17KCJ), MKZ:28, Min:52, Max:86        Intake/Output Summary (Last 24 hours) at 7/21/2023 1100  Last data filed at 7/21/2023 0950  Gross per 24 hour   Intake 3652.98 ml   Output 675 ml   Net 2977.98 ml       Invasive Devices     Peripheral Intravenous Line  Duration           Peripheral IV 07/19/23 Left Hand 1 day    Peripheral IV 07/21/23 Proximal;Right;Ventral (anterior) Forearm <1 day          Drain  Duration           External Urinary Catheter <1 day                  Physical Exam:  Physical Exam  Vitals and nursing note reviewed. Constitutional:       General: She is not in acute distress. Appearance: She is well-developed. She is not ill-appearing. HENT:      Head: Normocephalic and atraumatic. Eyes:      Conjunctiva/sclera: Conjunctivae normal.   Cardiovascular:      Rate and Rhythm: Tachycardia present. Rhythm irregular. Heart sounds: No murmur heard. Pulmonary:      Effort: Pulmonary effort is normal. No respiratory distress. Breath sounds: Rales present. Abdominal:      General: There is no distension. Palpations: Abdomen is soft. Tenderness: There is abdominal tenderness. Musculoskeletal:      Cervical back: Neck supple. Right lower leg: No edema. Left lower leg: No edema. Skin:     General: Skin is warm and dry. Capillary Refill: Capillary refill takes less than 2 seconds. Neurological:      Mental Status: She is alert and oriented to person, place, and time. Mental status is at baseline. Psychiatric:         Mood and Affect: Mood normal.          Imaging: I have personally reviewed pertinent reports. Telemetry:   Afib w/ RVR.  Temporary conversion to NSR when rate is controlled    Lab Results:       CBC with diff:   Results from last 7 days   Lab Units 07/21/23  0559 07/20/23  0416 07/19/23  2347 07/19/23  0728 07/18/23  2244   WBC Thousand/uL 12.10* 15.73* 14.85* 27.90* 27.64*   HEMOGLOBIN g/dL 10.3* 12.3 13.1 15.4 16.0* HEMATOCRIT % 30.9* 37.4 39.4 45.5 47.8*   MCV fL 93 91 90 89 90   PLATELETS Thousands/uL 145* 177 220 255 249   RBC Million/uL 3.32* 4.12 4.38 5.11 5.32*   MCH pg 31.0 29.9 29.9 30.1 30.1   MCHC g/dL 33.3 32.9 33.2 33.8 33.5   RDW % 13.7 13.5 13.5 12.9 12.7   MPV fL 9.2 9.3 9.1 9.1 9.5   NRBC AUTO /100 WBCs  --   --   --   --  0       CMP:   Results from last 7 days   Lab Units 07/20/23  0416 07/19/23  2347 07/19/23  0728 07/18/23  2244   POTASSIUM mmol/L 3.3* 3.6 3.6 3.3*   CHLORIDE mmol/L 112* 111* 108 105   CO2 mmol/L 18* 18* 18* 19*   BUN mg/dL 13 13 18 18   CREATININE mg/dL 0.70 0.70 0.97 0.99   CALCIUM mg/dL 7.6* 7.5* 9.3 9.5   AST U/L  --   --   --  17   ALT U/L  --   --   --  14   ALK PHOS U/L  --   --   --  118*   EGFR ml/min/1.73sq m 91 91 61 59       Note prepared with assistance from [employer] 4th year medical student

## 2023-07-21 NOTE — PROGRESS NOTES
Progress Note- General Surgery  Jluis Quinonez 70 y.o. female MRN: [id number]  Unit/Bed#: ICU 13 Encounter: [id number]    Assessment:  70 F w/ pmh of HTN, HLD, afib, CAD, COPD, SMA thrombosis s/p stent presents with abdominal pain, hematochezia and c/f ischemic colitis. Now s/p diagnostic lap converted to ex lap with extended left hemicolectomy and primary anastomosis 7/19    Plan:  • Clear liquid diet  • Maintenance IV fluids  • Start heparin drip  • Cardiology consult for A-fib with RVR; continue Cardene  • We will continue antibiotics through postop day 4  • Pain and nausea control as needed; consult APS for uncontrolled pain  • IS  • OOB  • PT/OT  • Should remain in stepdown 1 while having A-fib with RVR; will go to a telemetry bed when downgraded  • Please [employer] on call [facility] surgery resident or AP with any questions     Subjective/Objective     Subjective:   No acute events overnight. No bowel function. Pain uncontrolled with PCA, was switched to scheduled morphine yesterday but with minimal relief. Reports the pain is primarily with exertion; denies significant pain at rest    Pertinent review of systems as above. All other review of systems negative. Objective:    Blood pressure 156/71, pulse (!) 124, temperature 98.6 °F (37 °C), temperature source Oral, resp. rate (!) 24, height 5' 4" (1.626 m), weight 87.7 kg (193 lb 5.5 oz), SpO2 96 %, not currently breastfeeding. ,Body mass index is 33.19 kg/m². Intake/Output Summary (Last 24 hours) at 7/21/2023 1027  Last data filed at 7/21/2023 0950  Gross per 24 hour   Intake 3652.98 ml   Output 675 ml   Net 2977.98 ml       Invasive Devices     Peripheral Intravenous Line  Duration           Peripheral IV 07/19/23 Left Hand 1 day    Peripheral IV 07/21/23 Proximal;Right;Ventral (anterior) Forearm <1 day          Drain  Duration           External Urinary Catheter <1 day                Physical Exam:   Gen:  NAD. HEENT: NCAT.  MMM  CV: well perfused  Lungs: Normal respiratory effort  Abd: soft, appropriately tender, mildly distended; incisions clean dry and intact  Skin: warm/ dry  Extremities: no peripheral edema, no clubbing or cyanosis  Neuro: AxO x3      Results from last 7 days   Lab Units 07/21/23  0559 07/20/23  0416 07/19/23  2347   WBC Thousand/uL 12.10* 15.73* 14.85*   HEMOGLOBIN g/dL 10.3* 12.3 13.1   HEMATOCRIT % 30.9* 37.4 39.4   PLATELETS Thousands/uL 145* 177 220     Results from last 7 days   Lab Units 07/20/23  0416 07/19/23  2347 07/19/23  0728   POTASSIUM mmol/L 3.3* 3.6 3.6   CHLORIDE mmol/L 112* 111* 108   CO2 mmol/L 18* 18* 18*   BUN mg/dL 13 13 18   CREATININE mg/dL 0.70 0.70 0.97   CALCIUM mg/dL 7.6* 7.5* 9.3     Results from last 7 days   Lab Units 07/21/23  0559 07/18/23  2244   INR  1.49* 1.11   PTT seconds 34 33        I have personally reviewed pertinent films in PACS.   VTE Pharmacologic Prophylaxis: Heparin  VTE Mechanical Prophylaxis: sequential compression device

## 2023-07-21 NOTE — PHYSICAL THERAPY NOTE
Physical Therapy Evaluation:    2 forms of pt ID verified:name,birthdate and pt ID yessi    Patient's Name: Lor Monge    Admitting Diagnosis  Lactic acidosis [E87.20]  Colitis [K52.9]  GI bleeding [K92.2]  GI bleed [K92.2]    Problem List  Patient Active Problem List   Diagnosis    Angina pectoris ([address])    Essential hypertension    Migraines    AMD (age-related macular degeneration), bilateral    Allergic reaction    Gastroesophageal reflux disease without esophagitis    Allergic conjunctivitis of both eyes    Angio-edema    Paroxysmal atrial fibrillation (HCC)    Lumbar radiculopathy    CAMILLE (obstructive sleep apnea)    Hypertensive heart disease with congestive heart failure (HCC)    Unspecified diastolic (congestive) heart failure ([address])    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HCC)    COPD (chronic obstructive pulmonary disease) ([address])    Urinary retention    Peripheral vascular disease ([address])    Appendix disease    Carotid artery stenosis    Stenosis of left carotid artery    Asymptomatic stenosis of left carotid artery    Hepatic steatosis    Partial facial nerve palsy    Injury of left facial nerve    Colitis    Chronic migraine w/o aura w/o status migrainosus, not intractable    IIH (idiopathic intracranial hypertension)    Hematochezia    Left carotid stenosis    Colonic ischemia (HCC)    Acute postoperative pain       Past Medical History  Past Medical History:   Diagnosis Date    A-fib ([address]) 03/2020    Allergic     Anxiety     Arthritis     Asthma     Back pain     and muscle pain    Bruises easily     Chronic pain disorder     Interstitial pain    Colon polyp     Dental bridge present     Depression     Eczema     GERD (gastroesophageal reflux disease)     Heart murmur     History of blood clots     per pt "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"    History of pneumonia     Hives     Hyperlipidemia     Hypertension     Infertility, female     Interstitial cystitis     Migraine     sees neurologist    Motion sickness     Palpitations     PONV (postoperative nausea and vomiting)     Premature atrial contractions 11/9/2022    Psychiatric disorder     TIA (transient ischemic attack) 09/2022    per pt --"had MRI and 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"    Urinary incontinence     wears Pads    Venous insufficiency 8/1/2017    Wears glasses        Past Surgical History  Past Surgical History:   Procedure Laterality Date    COLONOSCOPY      DILATION AND CURETTAGE OF UTERUS      EGD      EXPLORATORY LAPAROTOMY      for infertility    EXPLORATORY LAPAROTOMY W/ BOWEL RESECTION N/A 7/19/2023    Procedure: LAPAROTOMY EXPLORATORY W/ BOWEL RESECTION;  Surgeon: Gerhardt Shores, MD;  [facility];  Service: General    HAND SURGERY      Hand excision of tendon cyst    LA LAPAROSCOPIC APPENDECTOMY N/A 05/03/2022    Procedure: APPENDECTOMY LAPAROSCOPIC;  Surgeon:  Gamal Sanches MD;  [facility];  Service: General    LA LAPS ABD PRTM&OMENTUM DX W/WO SPEC BR/WA SPX N/A 7/19/2023    Procedure: LAPAROSCOPY DIAGNOSTIC, LYSIS OF ADHESIONS, LAPAROSCOPY TAKE TAKEDOWN OF LEFT COLON, EXPLORATORY LAPAROSCOPY, LYSIS OF ADHESIONS, RESECTION OF TRANSVERSE COLON SPLENIC FLEXURE AND DESCENDING COLON , TAKE DOWN OF SPLENIC FLEXURE, SIGMOIDOSCOPY, MOBILIZATION OF RIGHT COLON, PRIMARY ANASTOMOSIS EEA 29, TAP BLOCK;  Surgeon: Gerhardt Shores, MD;  [facility];  Service: General    LA TEAEC W/PATCH GRF CAROTID VERTB [address] Left 1/31/2023    Procedure: EXPLORATION OF LEFT CAROTID ARTERY; ABORTED ENDARTERECTOMY ARTERY CAROTID;  Surgeon: Nga Peterson DO;  [facility];  Service: Vascular    SUPERIOR [address] EXTRACTION          07/21/23 1030   PT Last Visit   PT Visit Date 07/21/23   Note Type   Note type Evaluation   Pain Assessment   Pain Assessment Tool 0-10   Pain Score 8   Pain Location/Orientation Location: Abdomen   Effect of Pain on Daily Activities mobility   Hospital Pain Intervention(s) Repositioned; Ambulation/increased activity; Emotional support;Relaxation technique; Rest  (pursed lip breathing)   Restrictions/Precautions   Other Precautions Chair Alarm; Bed Alarm;Multiple lines;Telemetry;O2;Fall Risk;Pain  (inc anxious personality and behavior, fear of pain during movement, 4 L NC O2)   Home Living   Type of [address] One level;Performs ADLs on one level;Stairs to enter with rails;Stairs to enter without rails  (1 XIOMARA in back no HR, 7 XIOMARA in front entrance which pt uses to take out garbage, (+)drive)   Home Equipment Other (Comment)  (no use of DME for mobility PTA, own TB but does not use)   Additional Comments Pt lives alone in 1st floor apt with (+)XIOMARA, pt reports no recent falls, (+)Drive, (I) with ADLs and IADLs PTA, A from neighbor as needed   Prior Function   Level of [place] Independent with functional mobility; Independent with ADLs; Independent with IADLS  (per pt PTA)   Lives With Alone   Receives Help From Neighbor   IADLs Independent with driving; Independent with meal prep; Independent with medication management   Falls in the last 6 months 0  (per pt)   Vocational Retired   General   Additional Pertinent History colitis, GIB, s/p lap ex with bowel resection, ANNABELLA   Family/Caregiver Present No   Cognition   Overall Cognitive Status WFL   Arousal/Participation Cooperative   Attention Attends with cues to redirect   Orientation Level Oriented X4   Following Commands Follows one step commands with increased time or repetition   Comments 2* inc pain and pts fear of apin during mobility   Subjective   Subjective Pt supine in bed resting comfortably; pt willing and agreeable to work with PT and to participate in therapy intervention; "I'm afraid to move, it's going to hurt".    RLE Assessment   RLE Assessment   (at least 4/5 grossly throughout)   LLE Assessment   LLE Assessment   (at least 4/5 grossly throughout) Vision-Basic Assessment   Current Vision Wears glasses for distance only   Visual History Macular degeneration   Coordination   Movements are Fluid and Coordinated 0   Coordination and Movement Description ataxic and unsteady, inc pain during movement,dec BLE step length   Sensation WFL   Light Touch   RLE Light Touch Grossly intact   LLE Light Touch Grossly intact   Bed Mobility   Rolling L 3  Moderate assistance   Additional items Assist x 1;HOB elevated; Bedrails; Increased time required;Verbal cues;LE management   Supine to Sit 3  Moderate assistance   Additional items Assist x 1;HOB elevated; Bedrails; Increased time required;Verbal cues;LE management   Transfers   Sit to Stand 4  Minimal assistance   Additional items Assist x 1;Bedrails; Increased time required;Verbal cues   Stand to Sit 4  Minimal assistance   Additional items Assist x 1; Armrests; Increased time required;Verbal cues   Ambulation/Elevation   Gait pattern Poor UE support; Improper Weight shift; Antalgic;Narrow MEÑO; Forward Flexion; Shuffling; Inconsistent dory; Foward flexed; Short stride; Ataxia; Step to;Excessively slow   Gait Assistance 4  Minimal assist   Additional items Assist x 1;Verbal cues; Tactile cues   Assistive Device Rolling walker   Distance 10 steps bed->recliner chair with use of RW on tile surface   Balance   Static Sitting   (at EOB fair, and sitting in chair fair(+) with chair alarm and seat cushion)   Dynamic Sitting Poor +   Static Standing Poor +   Dynamic Standing Poor +   Ambulatory Poor +   Endurance Deficit   Endurance Deficit Yes   Endurance Deficit Description use of 4 L NC O2, inc fatigue and SOB during and following mobility; instruction for PLB 2* inc fear of pain during mobility and inc anxious behavior and personality   Activity Tolerance   Activity Tolerance Patient limited by fatigue;Patient limited by pain  (fair)   Medical Staff Made Aware OTR [place]   Nurse Made Aware yes   Assessment   Prognosis Fair   Problem List Decreased strength;Decreased endurance; Impaired balance;Decreased mobility; Impaired judgement;Decreased safety awareness; Obesity; Decreased skin integrity;Pain   Assessment Pt is a 69 yo female admitted to [facility] 2* colitis and GIB, s/p lap ex with bowel resection, ANNABELLA. Pt lives alone in 1st floor apt with (+)XIOMARA, no use of personal DME for mobility PTA, was completely (I) with ADLs and IADLs PTA, reports no recent falls, (+)drive and reports A from neighbor as needed. Pt currently is not at functional mobility baseline, needs A for mobility, multiple lines, current use of 4 L NC O2,inc ABD pain, recent surgical procedure and currently receiving medical care in ICU with telemetry monitoring. Pt demonstrates moderate to minimal deficits during functional mobility including dec endurance, dec balance, dec BLE strength, ataxic and unsteady gait and movement pattern, fear of inc pain during mobility, (+)ABD pain and needs mod Ax1 for bed mobility and min Ax1 for TT and GT with use of RW. Pt would cont to benefit from skilled inpt PT services to maximize functional independence and to dec caregiver burden upon being DC from hospital.   Barriers to Discharge Decreased caregiver support; Inaccessible home environment  ((+)XIOMARA and lives alone)   Goals   Patient Goals to go home eventually and to do things for herself   STG Expiration Date 07/31/23   Short Term Goal #1 in 7-10 days: (1) Pt will be able to ambulate greater than 100 feet with use of RW on various surfaces needing S level of A in order to A pt to return to PLOF, (2) activity tolerance:45 mins/45mins, (3) pt will be able to perform sit to stand transfers needing S level of A to and from various surfaces consistently in order to return to PLOF, (4) pt will be able to perform BM needing S level of A to A pt to return to PLOF, (5) (I) with BLE therapeutic ex HEP in various positions to A pt to inc balance,strength,mobility,endurance and to A to dec pain, (6) inc balance 1/2 grade in order to dec fall risk, (7) pt will be able to go up and down 7 steps and single curb step needing min to S level of A in order to navigate XIOMARA as able and as needed prior to D/C, (8) cont to provide pt and pt family education for safe D/C planning, (9) inc BLE strength 1/2 to 1 full grade in order to A pt to inc balance,strength,mobility,endurance and to A to dec pain   PT Treatment Day 0   Plan   Treatment/Interventions Functional transfer training;LE strengthening/ROM; Elevations; Therapeutic exercise; Endurance training;Patient/family training;Equipment eval/education; Bed mobility;Gait training;Spoke to nursing;OT   PT Frequency 3-5x/wk   Recommendation   PT Discharge Recommendation Post acute rehabilitation services   Equipment Recommended Walker  (needs RW for use at home)   [address]   Turning in Flat Bed Without Bedrails 2   Lying on Back to Sitting on Edge of Flat Bed Without Bedrails 2   Moving Bed to Chair 3   Standing Up From Chair Using Arms 3   Walk in Room 3   Climb 3-5 Stairs With Railing 2   Basic Mobility Inpatient Raw Score 15   Basic Mobility Standardized Score 36.97   Highest Level Of Mobility   JH-HLM Goal 4: Move to chair/commode   JH-HLM Achieved 6: Walk 10 steps or more           @Alondra Villalta, PT, DPT@

## 2023-07-21 NOTE — OCCUPATIONAL THERAPY NOTE
Occupational Therapy Evaluation     Patient Name: Carina Jimenez  Today's Date: 7/21/2023  Problem List  Principal Problem:    Colonic ischemia [facility])  Active Problems:    Essential hypertension    Paroxysmal atrial fibrillation (HCC)    COPD (chronic obstructive pulmonary disease) (HCC)    Colitis    Chronic migraine w/o aura w/o status migrainosus, not intractable    IIH (idiopathic intracranial hypertension)    Hematochezia    Left carotid stenosis    Acute postoperative pain    Past Medical History  Past Medical History:   Diagnosis Date    A-fib ([address]) 03/2020    Allergic     Anxiety     Arthritis     Asthma     Back pain     and muscle pain    Bruises easily     Chronic pain disorder     Interstitial pain    Colon polyp     Dental bridge present     Depression     Eczema     GERD (gastroesophageal reflux disease)     Heart murmur     History of blood clots     per pt "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"    History of pneumonia     Hives     Hyperlipidemia     Hypertension     Infertility, female     Interstitial cystitis     Migraine     sees neurologist    Motion sickness     Palpitations     PONV (postoperative nausea and vomiting)     Premature atrial contractions 11/9/2022    Psychiatric disorder     TIA (transient ischemic attack) 09/2022    per pt --"had MRI and 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"    Urinary incontinence     wears Pads    Venous insufficiency 8/1/2017    Wears glasses      Past Surgical History  Past Surgical History:   Procedure Laterality Date    COLONOSCOPY      DILATION AND CURETTAGE OF UTERUS      EGD      EXPLORATORY LAPAROTOMY      for infertility    EXPLORATORY LAPAROTOMY W/ BOWEL RESECTION N/A 7/19/2023    Procedure: LAPAROTOMY EXPLORATORY W/ BOWEL RESECTION;  Surgeon: Arvind Del Angel MD;  [facility];  Service: General    HAND SURGERY      Hand excision of tendon cyst    CA LAPAROSCOPIC APPENDECTOMY N/A 05/03/2022    Procedure: APPENDECTOMY LAPAROSCOPIC;  Surgeon: Phylicia Red MD;  [facility];  Service: General    WI LAPS ABD PRTM&OMENTUM DX W/WO SPEC BR/WA SPX N/A 7/19/2023    Procedure: LAPAROSCOPY DIAGNOSTIC, LYSIS OF ADHESIONS, LAPAROSCOPY TAKE TAKEDOWN OF LEFT COLON, EXPLORATORY LAPAROSCOPY, LYSIS OF ADHESIONS, RESECTION OF TRANSVERSE COLON SPLENIC FLEXURE AND DESCENDING COLON , TAKE DOWN OF SPLENIC FLEXURE, SIGMOIDOSCOPY, MOBILIZATION OF RIGHT COLON, PRIMARY ANASTOMOSIS EEA 29, TAP BLOCK;  Surgeon: Harrel Fleischer, MD;  [facility];  Service: General    WI TEAEC W/PATCH GRF CAROTID VERTB [address] Left 1/31/2023    Procedure: EXPLORATION OF LEFT CAROTID ARTERY; ABORTED ENDARTERECTOMY ARTERY CAROTID;  Surgeon: Joss Dorantes DO;  [facility];  Service: Vascular    SUPERIOR [facility] ARTERY STENT      WISDOM TOOTH EXTRACTION             07/21/23 1018   OT Last Visit   OT Visit Date 07/21/23   Note Type   Note type Evaluation   Pain Assessment   Pain Assessment Tool 0-10   Pain Score 8   Restrictions/Precautions   Weight Bearing Precautions Per Order No   Other Precautions Chair Alarm; Bed Alarm; Fall Risk;O2;Multiple lines;Telemetry;Pain  (4L 02)   Home Living   Type of [address] One level;Performs ADLs on one level;Stairs to enter with rails  (1 XIOMARA in back, 7 in front, enters from back)   Bathroom Shower/Tub Tub/shower unit   Bathroom Toilet Standard   Bathroom Accessibility Accessible   Home Equipment   (no DME)   Prior Function   Level of [place] Independent with ADLs; Independent with functional mobility; Independent with IADLS   Lives With Alone   Receives Help From Neighbor   IADLs Independent with driving; Independent with meal prep; Independent with medication management   Falls in the last 6 months 0   Vocational Retired   Lifestyle   Autonomy pta pt was (I) w ADLs and IADLs, (+) , 0 falls, lives alone w/ limited social support   Reciprocal Relationships family not local Service to Others retired   Intrinsic Gratification unable to identify any gratification at this time   General   Additional Pertinent History chronic migraine, COPD, MD, CHF, partial facial nerve palsy, left facial nerve, PVD, left carotid stenosis, colitis   Family/Caregiver Present No   Additional General Comments pt was pleasant and expressed that she is proud of herself   Subjective   Subjective "I am proud of myself"   ADL   Where Assessed Edge of bed   Eating Assistance 5  Supervision/Setup   Grooming Assistance 4  Minimal Assistance   UB Bathing Assistance 4  Minimal Assistance   LB Bathing Assistance 2  Maximal Assistance    [address] 2  Maximal Assistance   LB Dressing Deficit Don/doff R sock; Don/doff L sock; Increased time to complete;Supervision/safety   Toileting Assistance  3  Moderate Assistance   Bed Mobility   Rolling L 3  Moderate assistance   Additional items Assist x 1; Increased time required;Verbal cues; Bedrails   Supine to Sit 3  Moderate assistance   Additional items Assist x 1; Increased time required;Verbal cues   Transfers   Sit to Stand 4  Minimal assistance   Additional items Assist x 1; Increased time required   Stand to Sit 4  Minimal assistance   Additional items Assist x 1; Increased time required   Functional Mobility   Functional Mobility 4  Minimal assistance   Additional Comments x1, VCs and encouragement   Additional items Rolling walker   Balance   Static Sitting Fair +   Dynamic Sitting Fair +   Static Standing Fair +   Dynamic Standing Fair +   Ambulatory Fair +   Activity Tolerance   Activity Tolerance Patient limited by pain  (8/10)   Medical Staff Made Aware Nurse aware   Nurse Made Aware Nurse aware, PT Cassandra Mccartney Aware   RUE Assessment   RUE Assessment WFL   LUE Assessment   LUE Assessment WFL   Vision-Basic Assessment   Current Vision Wears glasses for distance only   Visual History Macular degeneration   Vision - Complex Assessment   Acuity Able to read employee name badge without difficulty   Cognition   Overall Cognitive Status [facility]   Arousal/Participation Alert; Cooperative   Attention Within functional limits   Orientation Level Oriented X4   Memory Within functional limits   Following Commands Follows one step commands without difficulty   Assessment   Limitation Decreased ADL status; Decreased self-care trans;Decreased high-level ADLs; Decreased endurance   Prognosis Good   Assessment Pt is a 70 y.o. female seen for OT evaluation s/p [facility]18[facility]2023 due to  Colonic ischemia ([address]). Personal and env factors supporting pt at time of IE include age, (I) PLOF and accessible home environment. Personal and env factors inhibiting engagement in occupations include limited social support, difficulty completing ADLs and difficulty completing IADLs. Performance deficits that affect the pt’s occupational performance can be seen above. Due to pt's current functional limitations and medical complications pt is functioning below baseline. Pt would benefit from continued skilled OT treatment in order to maximize safety, independence and overall performance with ADLs, IADLs, functional mobility and functional transfers in order to achieve highest level of function. Goals   Patient Goals to return home w/ PLOF   LTG Time Frame 10-14   Long Term Goal see below   Plan   Treatment Interventions ADL retraining;Functional transfer training; Endurance training;Patient/family training; Compensatory technique education; Energy conservation; Activityengagement;Equipment evaluation/education   Goal Expiration Date 08/04/23   OT Treatment Day 0   OT Frequency 3-5x/wk   Recommendation   OT Discharge Recommendation Post acute rehabilitation services  (vs HHOT, potential for improvement w/time and pain control)   Additional Comments  The patient's raw score on the AM-PAC Daily Activity Inpatient Short Form is 16.  A raw score of less than 19 suggests the patient may benefit from discharge to post-acute rehabilitation services. Please refer to the recommendation of the Occupational Therapist for safe discharge planning. AM-PAC Daily Activity Inpatient   Lower Body Dressing 2   Bathing 2   Toileting 2   Upper Body Dressing 3   Grooming 3   Eating 4   Daily Activity Raw Score 16   Daily Activity Standardized Score (Calc for Raw Score >=11) 35.96   AM-PAC Applied Cognition Inpatient   Following a Speech/Presentation 4   Understanding Ordinary Conversation 4   Taking Medications 4   Remembering Where Things Are Placed or Put Away 4   Remembering List of 4-5 Errands 4   Taking Care of Complicated Tasks 4   Applied Cognition Raw Score 24   Applied Cognition Standardized Score 62.21   End of Consult   Education Provided Yes  (regarding safe fnxl transfers and importance of OOB)   Patient Position at End of Consult Bedside chair;Bed/Chair alarm activated; All needs within reach   Nurse Communication Nurse aware of consult     GOALS    Pt will improve activity tolerance to G for min 30 min txment sessions for increase engagement in functional tasks    Pt will complete bed mobility at a Mod I level w/ G balance/safety demonstrated to decrease caregiver assistance required     Pt will complete UB dressing/self care w/ mod I using adaptive device and DME as needed     Pt will complete LB dressing/self care w/ mod I using adaptive device and DME as needed    Pt will complete toileting w/ mod I w/ G hygiene/thoroughness using DME as needed    Pt will improve functional transfers to Mod I on/off all surfaces using DME as needed w/ G balance/safety     Pt will improve functional mobility during ADL/IADL/leisure tasks to Mod I using DME as needed w/ G balance/safety     Pt will demonstrate G carryover of pt/caregiver education and training as appropriate w/o cues w/ good tolerance to increase safety during functional tasks    Pt will demonstrate 100% carryover of energy conservation techniques t/o functional I/ADL/leisure tasks w/o cues s/p skilled education to increase endurance during functional tasks    Pt will participate in simulated IADL management task to increase independence to Mod I w/ G safety and endurance    Pt will increase standing tolerance to 5 mins with good dynamic standing balance to increase safety during participation in ADLs \    Meng Larsen, OTR/L

## 2023-07-21 NOTE — ASSESSMENT & PLAN NOTE
· Initial 7/18 CT abdomen pelvis with "extensive colitis without pericolonic free air or abscess. · Repeat 7/19 CT abdomen pelvis with contrast " proximal SMA stent is patent. Cannot exclude some degree of stenosis at the origin of the RAMBO. Redemonstrated colitis somewhat matching the RAMBO distribution but sparing the sigmoid colon and rectum. No pneumatosis, abscess, or perforation. · General surgery was consulted, appreciate recommendations  · Patient initially had persistent lactic acidosis, was volume resuscitated by general surgery. Lactic acidosis improved. · Patient now POD #2 s/p ex lap with bowel resection, lysis of adhesions, laparoscopic takedown of left colon, resection of transverse colon, splenic flexure, and descending colon, sigmoidoscopy, mobilization of right colon. · Patient transferred to stepdown level 1 with medical critical care postoperatively. Patient did have extensive anesthesia exposure.   · Maintenance IVF with Isolyte at 125 mL/hr  · N.p.o.  · Receive p.o. meds when clear with surgery  · OOB QID today

## 2023-07-21 NOTE — PROGRESS NOTES
[facility]  Progress Note  Name: Margarette Boxer  MRN: [id number]  Unit/Bed#: ICU 13 I Date of Admission: 7/18/2023   Date of Service: 7/21/2023 I Hospital Day: 2    Assessment/Plan   * Colonic ischemia [facility])  Assessment & Plan  · Initial 7/18 CT abdomen pelvis with "extensive colitis without pericolonic free air or abscess. · Repeat 7/19 CT abdomen pelvis with contrast " proximal SMA stent is patent. Cannot exclude some degree of stenosis at the origin of the RAMBO. Redemonstrated colitis somewhat matching the RAMBO distribution but sparing the sigmoid colon and rectum. No pneumatosis, abscess, or perforation. · General surgery was consulted, appreciate recommendations  · Patient initially had persistent lactic acidosis, was volume resuscitated by general surgery. Lactic acidosis improved. · Patient now POD #2 s/p ex lap with bowel resection, lysis of adhesions, laparoscopic takedown of left colon, resection of transverse colon, splenic flexure, and descending colon, sigmoidoscopy, mobilization of right colon. · Patient transferred to stepdown level 1 with medical critical care postoperatively. Patient did have extensive anesthesia exposure. · Maintenance IVF with Isolyte at 125 mL/hr  · N.p.o.  · Maintain NGT  · Receive p.o. meds when clear with surgery    Hematochezia  Assessment & Plan  • On admission presented with abdominal pain, nausea vomiting, and rectal bleeding. • On chart review patient was initially complaining of constipation x5 days, then passed a hard BM and had multiple episodes of BRBPR. • Hemoglobin stable  • Eliquis currently on hold secondary 2/2 abdominal surgery. Colitis  Assessment & Plan  · Patient was admitted under medicine with suspicion for colitis.   · Patient found to have ischemic colon  · See above    Paroxysmal atrial fibrillation ([address])  Assessment & Plan  · Was on Eliquis preoperatively, received Kcentra for reversal  · PTA Rate control with diltiazem and metoprolol  · Developed Afib RVR POD #1  · Currently atrial fibrillation/atrial flutter on telemetry    Plan:   · Resume AC once okay with surgery  · Plan to resume home p.o. rate control meds  · Continue metoprolol IV 10 mg every 6 hours  · Continue Cardizem IV 15 mg twice daily  · If unable to maintain rate control will start Cardizem infusion        Acute postoperative pain  Assessment & Plan  · Received Exparel intraoperatively  · Patient was not using PCA appropriately. · May need to consider APS consult if pain not controlled    Plan:  · Continue morphine for pain control  · Continue Robaxin  · Continue Zofran for as needed nausea  · Continue Dilaudid as needed for breakthrough pain    Left carotid stenosis  Assessment & Plan  • Admitted September 2022 on stroke pathway. MRI was negative. Believed her symptoms were attributed to migraine and patient was discharged on aspirin and Eliquis. • Ultimately patient was diagnosed with left ICA stenosis 80% under went unsuccessful CEA with subsequent left facial numbness and left facial asymmetry. • She was again admitted February 2023 with left facial droop and bilateral upper extremity paresthesias with negative stroke work-up that admission as well. • Carotid stenosis medically managed. IIH (idiopathic intracranial hypertension)  Assessment & Plan  • Patient follows with neurology as outpatient, suspected to be exacerbated by untreated CAMILLE. Patient was referred for outpatient sleep study.   •       Chronic migraine w/o aura w/o status migrainosus, not intractable  Assessment & Plan  • Follows with [facility] neurology in [facility]. [facility] headache center for chronic migraines  • Was started on Diamox, patient was concerned about side effects and decreased to 1 tab daily  • No currently complaints of migraine    COPD (chronic obstructive pulmonary disease) ([address] )  Assessment & Plan  · Without acute exacerbation  · Home regimen with Valentin Ji Singulair  · Encourage IS use  · Out of bed QID    Essential hypertension  Assessment & Plan  · Home regimen with Cardizem  mg, metoprolol succinate 100 mg twice daily, lasix  · Currently normotensive  · Continue Cardizem IV 50 mg twice daily, metoprolol IV 10 mg every 6 hours           ICU Core Measures     A: Assess, Prevent, and Manage Pain · Has pain been assessed? Yes  · Need for changes to pain regimen? No   B: Both SAT/SAT  · N/A   C: Choice of Sedation · RASS Goal: N/A patient not on sedation  · Need for changes to sedation or analgesia regimen? No   D: Delirium · CAM-ICU: Negative   E: Early Mobility  · Plan for early mobility? Yes   F: Family Engagement · Plan for family engagement today? Yes       Antibiotic Review: Post op requirements     Review of Invasive Devices:            Prophylaxis:  VTE VTE covered by:  heparin (porcine), Subcutaneous, 5,000 Units at 07/20/23 2101       Stress Ulcer  covered byFamotidine (PF) (PEPCID) injection 20 mg [[id number]]       Subjective   HPI/24hr events:   · From surgical perspective patient is stable for downgrade from ICU. However yesterday developed A-fib with RVR. Rate is now more controlled; however, still has bursts of A-fib with RVR. Review of Systems   Constitutional: Negative for chills, fatigue and fever. HENT: Negative. Eyes: Negative. Respiratory: Negative for cough, chest tightness, shortness of breath and wheezing. Cardiovascular: Negative for chest pain, palpitations and leg swelling. Gastrointestinal: Positive for abdominal pain (incision appropriately tender). Negative for abdominal distention, diarrhea, nausea and vomiting. Endocrine: Negative. Genitourinary: Negative for decreased urine volume, difficulty urinating, frequency and hematuria. Musculoskeletal: Negative for arthralgias and myalgias. Skin: Negative for pallor, rash and wound. Allergic/Immunologic: Negative.     Neurological: Negative for dizziness, seizures, weakness and light-headedness. Hematological: Negative. Psychiatric/Behavioral: The patient is nervous/anxious. Objective                          Vitals I/O      Most Recent Min/Max in 24hrs   Temp 98.2 °F (36.8 °C) Temp  Min: 97.6 °F (36.4 °C)  Max: 98.9 °F (37.2 °C)   Pulse (!) 122 Pulse  Min: 82  Max: 144   Resp 21 Resp  Min: 17  Max: 43   /73 BP  Min: 112/57  Max: 153/62   O2 Sat 96 % SpO2  Min: 94 %  Max: 98 %      Intake/Output Summary (Last 24 hours) at 7/21/2023 0434  Last data filed at 7/21/2023 0401  Gross per 24 hour   Intake 4112.18 ml   Output 725 ml   Net 3387.18 ml         Diet NPO; Sips with meds     Invasive Monitoring Physical exam    Physical Exam  Vitals and nursing note reviewed. Constitutional:       General: She is not in acute distress. Appearance: She is obese. She is ill-appearing. She is not toxic-appearing or diaphoretic. HENT:      Head: Normocephalic and atraumatic. Right Ear: External ear normal.      Left Ear: External ear normal.      Nose: Nose normal. No congestion or rhinorrhea. Mouth/Throat:      Mouth: Mucous membranes are moist.      Pharynx: Oropharynx is clear. No oropharyngeal exudate or posterior oropharyngeal erythema. Eyes:      General: No scleral icterus. Extraocular Movements: Extraocular movements intact. Conjunctiva/sclera: Conjunctivae normal.      Pupils: Pupils are equal, round, and reactive to light. Neck:      Vascular: No carotid bruit. Cardiovascular:      Rate and Rhythm: Tachycardia present. Rhythm irregular. Heart sounds: Normal heart sounds. No murmur heard. No friction rub. No gallop. Pulmonary:      Effort: Pulmonary effort is normal. No respiratory distress. Breath sounds: Normal breath sounds. No wheezing, rhonchi or rales. Abdominal:      General: Bowel sounds are decreased. There is no distension. Palpations: Abdomen is soft. Tenderness:  There is abdominal tenderness (incision appropriately tender). Musculoskeletal:         General: Normal range of motion. Cervical back: Normal range of motion and neck supple. Right lower leg: No edema. Left lower leg: No edema. Lymphadenopathy:      Cervical: No cervical adenopathy. Skin:     General: Skin is warm and dry. Capillary Refill: Capillary refill takes less than 2 seconds. Coloration: Skin is pale. Findings: No bruising, erythema or rash. Neurological:      General: No focal deficit present. Mental Status: She is alert and oriented to person, place, and time. Mental status is at baseline. GCS: GCS eye subscore is 4. GCS verbal subscore is 5. GCS motor subscore is 6. Cranial Nerves: Cranial nerves 2-12 are intact. No cranial nerve deficit. Sensory: Sensation is intact. No sensory deficit. Motor: Motor function is intact. No weakness. Psychiatric:         Mood and Affect: Mood normal.         Behavior: Behavior normal.         Thought Content: Thought content normal.         Judgment: Judgment normal.              Diagnostic Studies      EKG: afib with RVR, atrial flutter at times. Imaging: no new imaging I have personally reviewed pertinent reports.    and I have personally reviewed pertinent films in PACS     Medications:  Scheduled PRN   acetaminophen, 650 mg, Q6H BONIFACIO  cefTRIAXone, 1,000 mg, Q24H  chlorhexidine, 15 mL, Q12H BONIFACIO  diltiazem, 15 mg, BID  diltiazem, 15 mg, Once  famotidine, 20 mg, HS  fluticasone-vilanterol, 1 puff, Daily  heparin (porcine), 5,000 Units, Q8H BONIFACIO  methocarbamol, 500 mg, Q6H BONIFACIO  metoprolol, 10 mg, Q6H  morphine injection, 2 mg, Q4H BONIFACIO  sucralfate, 1 g, 4x Daily (AC & HS)      aluminum-magnesium hydroxide-simethicone, 30 mL, Q6H PRN  HYDROmorphone, 0.5 mg, Q4H PRN  metoprolol, 5 mg, Q6H PRN  ondansetron, 4 mg, Q6H PRN  oxyCODONE, 5 mg, Q4H PRN  polyethylene glycol, 17 g, Daily PRN       Continuous multi-electrolyte, 125 mL/hr, Last Rate: 125 mL/hr (07/20/23 1845)         Labs:    CBC    Recent Labs     07/19/23 2347 07/20/23  0416   WBC 14.85* 15.73*   HGB 13.1 12.3   HCT 39.4 37.4    177   BANDSPCT  --  31*     BMP    Recent Labs     07/19/23  2347 07/20/23  0416   SODIUM 139 139   K 3.6 3.3*   * 112*   CO2 18* 18*   AGAP 10 9   BUN 13 13   CREATININE 0.70 0.70   CALCIUM 7.5* 7.6*       Coags    No recent results     Additional Electrolytes  Recent Labs     07/19/23  2347   MG 1.7*   PHOS 2.1*          Blood Gas    Recent Labs     07/19/23  2347   PHART 7.327*   UNO6EAN 31.6*   PO2ART 84.2   VEJ2JIU 16.2*   BEART -8.6   SOURCE Line, Arterial     Recent Labs     07/19/23  2347   SOURCE Line, Arterial    LFTs  No recent results    Infectious  Recent Labs     07/19/23  0728   PROCALCITONI 0.42*     Glucose  Recent Labs     07/19/23  0728 07/19/23  2347 07/20/23  0416   GLUC 185* 177* 171*               Please see Medical Critical Care Attending Attestation for critical care time.      Boris Marti

## 2023-07-21 NOTE — ASSESSMENT & PLAN NOTE
• On admission presented with abdominal pain, nausea vomiting, and rectal bleeding. • On chart review patient was initially complaining of constipation x5 days, then passed a hard BM and had multiple episodes of BRBPR. • Hemoglobin stable  • Eliquis currently on hold secondary 2/2 abdominal surgery.

## 2023-07-21 NOTE — ASSESSMENT & PLAN NOTE
• Follows with [facility] neurology in [facility]. [facility] center for chronic migraines  • Was started on Diamox, patient was concerned about side effects and decreased to 1 tab daily  • No currently complaints of migraine

## 2023-07-21 NOTE — ASSESSMENT & PLAN NOTE
· Received Exparel intraoperatively  · Patient was not using PCA appropriately.   · May need to consider APS consult if pain not controlled    Plan:  · Continue morphine for pain control  · Continue Robaxin  · Continue Zofran for as needed nausea  · Continue Dilaudid as needed for breakthrough pain

## 2023-07-21 NOTE — ASSESSMENT & PLAN NOTE
· Home regimen with Cardizem  mg, metoprolol succinate 100 mg twice daily, lasix  · Currently normotensive  · Continue Cardizem IV 50 mg twice daily, metoprolol IV 10 mg every 6 hours

## 2023-07-21 NOTE — ASSESSMENT & PLAN NOTE
· Without acute exacerbation  · Home regimen with Roxanna Bishop  · Encourage IS use  · Out of bed QID

## 2023-07-21 NOTE — PLAN OF CARE
Problem: PAIN - ADULT  Goal: Verbalizes/displays adequate comfort level or baseline comfort level  Description: Interventions:  - Encourage patient to monitor pain and request assistance  - Assess pain using appropriate pain scale  - Administer analgesics based on type and severity of pain and evaluate response  - Implement non-pharmacological measures as appropriate and evaluate response  - Consider cultural and social influences on pain and pain management  - Notify physician/advanced practitioner if interventions unsuccessful or patient reports new pain  Outcome: Progressing     Problem: INFECTION - ADULT  Goal: Absence or prevention of progression during hospitalization  Description: INTERVENTIONS:  - Assess and monitor for signs and symptoms of infection  - Monitor lab/diagnostic results  - Monitor all insertion sites, i.e. indwelling lines, tubes, and drains  - Monitor endotracheal if appropriate and nasal secretions for changes in amount and color  - [place] appropriate cooling/warming therapies per order  - Administer medications as ordered  - Instruct and encourage patient and family to use good hand hygiene technique  - Identify and instruct in appropriate isolation precautions for identified infection/condition  Outcome: Progressing  Goal: Absence of fever/infection during neutropenic period  Description: INTERVENTIONS:  - Monitor WBC    Outcome: Progressing     Problem: SAFETY ADULT  Goal: Patient will remain free of falls  Description: INTERVENTIONS:  - Educate patient/family on patient safety including physical limitations  - Instruct patient to call for assistance with activity   - Consult OT/PT to assist with strengthening/mobility   - Keep Call bell within reach  - Keep bed low and locked with side rails adjusted as appropriate  - Keep care items and personal belongings within reach  - Initiate and maintain comfort rounds  - Make Fall Risk Sign visible to staff  - Apply yellow socks and bracelet for high fall risk patients  - Consider moving patient to room near nurses station  Outcome: Progressing  Goal: Maintain or return to baseline ADL function  Description: INTERVENTIONS:  -  Assess patient's ability to carry out ADLs; assess patient's baseline for ADL function and identify physical deficits which impact ability to perform ADLs (bathing, care of mouth/teeth, toileting, grooming, dressing, etc.)  - Assess/evaluate cause of self-care deficits   - Assess range of motion  - Assess patient's mobility; develop plan if impaired  - Assess patient's need for assistive devices and provide as appropriate  - Encourage maximum independence but intervene and supervise when necessary  - Involve family in performance of ADLs  - Assess for home care needs following discharge   - Consider OT consult to assist with ADL evaluation and planning for discharge  - Provide patient education as appropriate  Outcome: Progressing  Goal: Maintains/Returns to pre admission functional level  Description: INTERVENTIONS:  - Perform BMAT or MOVE assessment daily.   - Set and communicate daily mobility goal to care team and patient/family/caregiver.    - Collaborate with rehabilitation services on mobility goals if consulted  - Out of bed for toileting  - Record patient progress and toleration of activity level   Outcome: Progressing     Problem: DISCHARGE PLANNING  Goal: Discharge to home or other facility with appropriate resources  Description: INTERVENTIONS:  - Identify barriers to discharge w/patient and caregiver  - Arrange for needed discharge resources and transportation as appropriate  - Identify discharge learning needs (meds, wound care, etc.)  - Arrange for interpretive services to assist at discharge as needed  - Refer to Case Management Department for coordinating discharge planning if the patient needs post-hospital services based on physician/advanced practitioner order or complex needs related to functional status, cognitive ability, or social support system  Outcome: Progressing     Problem: Knowledge Deficit  Goal: Patient/family/caregiver demonstrates understanding of disease process, treatment plan, medications, and discharge instructions  Description: Complete learning assessment and assess knowledge base.   Interventions:  - Provide teaching at level of understanding  - Provide teaching via preferred learning methods  Outcome: Progressing     Problem: Prexisting or High Potential for Compromised Skin Integrity  Goal: Skin integrity is maintained or improved  Description: INTERVENTIONS:  - Identify patients at risk for skin breakdown  - Assess and monitor skin integrity  - Assess and monitor nutrition and hydration status  - Monitor labs   - Assess for incontinence   - Turn and reposition patient  - Assist with mobility/ambulation  - Relieve pressure over bony prominences  - Avoid friction and shearing  - Provide appropriate hygiene as needed including keeping skin clean and dry  - Evaluate need for skin moisturizer/barrier cream  - Collaborate with interdisciplinary team   - Patient/family teaching  - Consider wound care consult   Outcome: Progressing     Problem: MOBILITY - ADULT  Goal: Maintain or return to baseline ADL function  Description: INTERVENTIONS:  -  Assess patient's ability to carry out ADLs; assess patient's baseline for ADL function and identify physical deficits which impact ability to perform ADLs (bathing, care of mouth/teeth, toileting, grooming, dressing, etc.)  - Assess/evaluate cause of self-care deficits   - Assess range of motion  - Assess patient's mobility; develop plan if impaired  - Assess patient's need for assistive devices and provide as appropriate  - Encourage maximum independence but intervene and supervise when necessary  - Involve family in performance of ADLs  - Assess for home care needs following discharge   - Consider OT consult to assist with ADL evaluation and planning for discharge  - Provide patient education as appropriate  Outcome: Progressing  Goal: Maintains/Returns to pre admission functional level  Description: INTERVENTIONS:  - Perform BMAT or MOVE assessment daily.   - Set and communicate daily mobility goal to care team and patient/family/caregiver.    - Collaborate with rehabilitation services on mobility goals if consulted  - Out of bed for toileting  - Record patient progress and toleration of activity level   Outcome: Progressing

## 2023-07-22 LAB
ANION GAP SERPL CALCULATED.3IONS-SCNC: 7 MMOL/L
ANION GAP SERPL CALCULATED.3IONS-SCNC: 7 MMOL/L
APTT PPP: 39 SECONDS (ref 23–37)
APTT PPP: 44 SECONDS (ref 23–37)
APTT PPP: >210 SECONDS (ref 23–37)
BASE EX.OXY STD BLDV CALC-SCNC: 57.8 % (ref 60–80)
BASE EXCESS BLDV CALC-SCNC: 3.7 MMOL/L
BASOPHILS # BLD AUTO: 0.03 THOUSANDS/ÂΜL (ref 0–0.1)
BASOPHILS NFR BLD AUTO: 0 % (ref 0–1)
BNP SERPL-MCNC: 508 PG/ML (ref 0–100)
BUN SERPL-MCNC: 10 MG/DL (ref 5–25)
BUN SERPL-MCNC: 9 MG/DL (ref 5–25)
CALCIUM SERPL-MCNC: 7.7 MG/DL (ref 8.4–10.2)
CALCIUM SERPL-MCNC: 8 MG/DL (ref 8.4–10.2)
CHLORIDE SERPL-SCNC: 103 MMOL/L (ref 96–108)
CHLORIDE SERPL-SCNC: 105 MMOL/L (ref 96–108)
CO2 SERPL-SCNC: 27 MMOL/L (ref 21–32)
CO2 SERPL-SCNC: 29 MMOL/L (ref 21–32)
CREAT SERPL-MCNC: 0.47 MG/DL (ref 0.6–1.3)
CREAT SERPL-MCNC: 0.47 MG/DL (ref 0.6–1.3)
EOSINOPHIL # BLD AUTO: 0.35 THOUSAND/ÂΜL (ref 0–0.61)
EOSINOPHIL NFR BLD AUTO: 3 % (ref 0–6)
ERYTHROCYTE [DISTWIDTH] IN BLOOD BY AUTOMATED COUNT: 13.4 % (ref 11.6–15.1)
ERYTHROCYTE [DISTWIDTH] IN BLOOD BY AUTOMATED COUNT: 13.5 % (ref 11.6–15.1)
GFR SERPL CREATININE-BSD FRML MDRD: 103 ML/MIN/1.73SQ M
GFR SERPL CREATININE-BSD FRML MDRD: 103 ML/MIN/1.73SQ M
GLUCOSE SERPL-MCNC: 93 MG/DL (ref 65–140)
GLUCOSE SERPL-MCNC: 97 MG/DL (ref 65–140)
HCO3 BLDV-SCNC: 28.6 MMOL/L (ref 24–30)
HCT VFR BLD AUTO: 28.9 % (ref 34.8–46.1)
HCT VFR BLD AUTO: 29 % (ref 34.8–46.1)
HGB BLD-MCNC: 9.4 G/DL (ref 11.5–15.4)
HGB BLD-MCNC: 9.4 G/DL (ref 11.5–15.4)
IMM GRANULOCYTES # BLD AUTO: 0.07 THOUSAND/UL (ref 0–0.2)
IMM GRANULOCYTES NFR BLD AUTO: 1 % (ref 0–2)
LYMPHOCYTES # BLD AUTO: 1.28 THOUSANDS/ÂΜL (ref 0.6–4.47)
LYMPHOCYTES NFR BLD AUTO: 11 % (ref 14–44)
MAGNESIUM SERPL-MCNC: 2.2 MG/DL (ref 1.9–2.7)
MAGNESIUM SERPL-MCNC: 2.3 MG/DL (ref 1.9–2.7)
MCH RBC QN AUTO: 29.9 PG (ref 26.8–34.3)
MCH RBC QN AUTO: 30 PG (ref 26.8–34.3)
MCHC RBC AUTO-ENTMCNC: 32.4 G/DL (ref 31.4–37.4)
MCHC RBC AUTO-ENTMCNC: 32.5 G/DL (ref 31.4–37.4)
MCV RBC AUTO: 92 FL (ref 82–98)
MCV RBC AUTO: 93 FL (ref 82–98)
MONOCYTES # BLD AUTO: 0.47 THOUSAND/ÂΜL (ref 0.17–1.22)
MONOCYTES NFR BLD AUTO: 4 % (ref 4–12)
NEUTROPHILS # BLD AUTO: 9.5 THOUSANDS/ÂΜL (ref 1.85–7.62)
NEUTS SEG NFR BLD AUTO: 81 % (ref 43–75)
NRBC BLD AUTO-RTO: 0 /100 WBCS
O2 CT BLDV-SCNC: 8.2 ML/DL
PCO2 BLDV: 44.8 MM HG (ref 42–50)
PH BLDV: 7.42 [PH] (ref 7.3–7.4)
PHOSPHATE SERPL-MCNC: 1.5 MG/DL (ref 2.3–4.1)
PHOSPHATE SERPL-MCNC: 1.5 MG/DL (ref 2.3–4.1)
PLATELET # BLD AUTO: 173 THOUSANDS/UL (ref 149–390)
PLATELET # BLD AUTO: 189 THOUSANDS/UL (ref 149–390)
PMV BLD AUTO: 9.6 FL (ref 8.9–12.7)
PMV BLD AUTO: 9.7 FL (ref 8.9–12.7)
PO2 BLDV: 31.4 MM HG (ref 35–45)
POTASSIUM SERPL-SCNC: 2.9 MMOL/L (ref 3.5–5.3)
POTASSIUM SERPL-SCNC: 3 MMOL/L (ref 3.5–5.3)
RBC # BLD AUTO: 3.13 MILLION/UL (ref 3.81–5.12)
RBC # BLD AUTO: 3.14 MILLION/UL (ref 3.81–5.12)
SODIUM SERPL-SCNC: 139 MMOL/L (ref 135–147)
SODIUM SERPL-SCNC: 139 MMOL/L (ref 135–147)
WBC # BLD AUTO: 11.12 THOUSAND/UL (ref 4.31–10.16)
WBC # BLD AUTO: 11.7 THOUSAND/UL (ref 4.31–10.16)

## 2023-07-22 PROCEDURE — 85027 COMPLETE CBC AUTOMATED: CPT | Performed by: SURGERY

## 2023-07-22 PROCEDURE — 85730 THROMBOPLASTIN TIME PARTIAL: CPT | Performed by: INTERNAL MEDICINE

## 2023-07-22 PROCEDURE — 84100 ASSAY OF PHOSPHORUS: CPT | Performed by: SURGERY

## 2023-07-22 PROCEDURE — 83735 ASSAY OF MAGNESIUM: CPT | Performed by: INTERNAL MEDICINE

## 2023-07-22 PROCEDURE — 83735 ASSAY OF MAGNESIUM: CPT | Performed by: SURGERY

## 2023-07-22 PROCEDURE — 84100 ASSAY OF PHOSPHORUS: CPT | Performed by: INTERNAL MEDICINE

## 2023-07-22 PROCEDURE — 99232 SBSQ HOSP IP/OBS MODERATE 35: CPT | Performed by: INTERNAL MEDICINE

## 2023-07-22 PROCEDURE — 80048 BASIC METABOLIC PNL TOTAL CA: CPT | Performed by: INTERNAL MEDICINE

## 2023-07-22 PROCEDURE — 85025 COMPLETE CBC W/AUTO DIFF WBC: CPT | Performed by: INTERNAL MEDICINE

## 2023-07-22 PROCEDURE — 80048 BASIC METABOLIC PNL TOTAL CA: CPT | Performed by: SURGERY

## 2023-07-22 PROCEDURE — 99231 SBSQ HOSP IP/OBS SF/LOW 25: CPT | Performed by: SURGERY

## 2023-07-22 PROCEDURE — 85730 THROMBOPLASTIN TIME PARTIAL: CPT | Performed by: SURGERY

## 2023-07-22 PROCEDURE — 82805 BLOOD GASES W/O2 SATURATION: CPT | Performed by: SURGERY

## 2023-07-22 PROCEDURE — 83880 ASSAY OF NATRIURETIC PEPTIDE: CPT | Performed by: INTERNAL MEDICINE

## 2023-07-22 PROCEDURE — 99233 SBSQ HOSP IP/OBS HIGH 50: CPT | Performed by: INTERNAL MEDICINE

## 2023-07-22 RX ORDER — MORPHINE SULFATE 4 MG/ML
4 INJECTION, SOLUTION INTRAMUSCULAR; INTRAVENOUS EVERY 2 HOUR PRN
Status: DISCONTINUED | OUTPATIENT
Start: 2023-07-22 | End: 2023-07-28 | Stop reason: HOSPADM

## 2023-07-22 RX ORDER — OXYCODONE HYDROCHLORIDE 5 MG/1
5 TABLET ORAL EVERY 4 HOURS PRN
Status: DISCONTINUED | OUTPATIENT
Start: 2023-07-22 | End: 2023-07-28 | Stop reason: HOSPADM

## 2023-07-22 RX ORDER — FUROSEMIDE 10 MG/ML
40 INJECTION INTRAMUSCULAR; INTRAVENOUS ONCE
Status: COMPLETED | OUTPATIENT
Start: 2023-07-22 | End: 2023-07-22

## 2023-07-22 RX ORDER — OXYCODONE HYDROCHLORIDE 10 MG/1
10 TABLET ORAL EVERY 4 HOURS PRN
Status: DISCONTINUED | OUTPATIENT
Start: 2023-07-22 | End: 2023-07-28 | Stop reason: HOSPADM

## 2023-07-22 RX ORDER — HYDROMORPHONE HCL IN WATER/PF 6 MG/30 ML
0.2 PATIENT CONTROLLED ANALGESIA SYRINGE INTRAVENOUS EVERY 2 HOUR PRN
Status: DISCONTINUED | OUTPATIENT
Start: 2023-07-22 | End: 2023-07-22

## 2023-07-22 RX ORDER — POTASSIUM CHLORIDE 20 MEQ/1
40 TABLET, EXTENDED RELEASE ORAL 2 TIMES DAILY
Status: COMPLETED | OUTPATIENT
Start: 2023-07-22 | End: 2023-07-22

## 2023-07-22 RX ORDER — POTASSIUM CHLORIDE 20 MEQ/1
40 TABLET, EXTENDED RELEASE ORAL ONCE
Status: DISCONTINUED | OUTPATIENT
Start: 2023-07-22 | End: 2023-07-25

## 2023-07-22 RX ORDER — CALCIUM GLUCONATE 20 MG/ML
2 INJECTION, SOLUTION INTRAVENOUS ONCE
Status: DISCONTINUED | OUTPATIENT
Start: 2023-07-22 | End: 2023-07-28 | Stop reason: HOSPADM

## 2023-07-22 RX ORDER — FENTANYL CITRATE-0.9 % NACL/PF 10 MCG/ML
PLASTIC BAG, INJECTION (ML) INTRAVENOUS
Status: COMPLETED
Start: 2023-07-22 | End: 2023-07-22

## 2023-07-22 RX ORDER — DILTIAZEM HYDROCHLORIDE 5 MG/ML
10 INJECTION INTRAVENOUS
Status: DISCONTINUED | OUTPATIENT
Start: 2023-07-22 | End: 2023-07-28 | Stop reason: HOSPADM

## 2023-07-22 RX ADMIN — CHLORHEXIDINE GLUCONATE 15 ML: 1.2 RINSE ORAL at 20:27

## 2023-07-22 RX ADMIN — ACETAMINOPHEN 325MG 650 MG: 325 TABLET ORAL at 17:57

## 2023-07-22 RX ADMIN — HEPARIN SODIUM 2000 UNITS: 1000 INJECTION INTRAVENOUS; SUBCUTANEOUS at 12:42

## 2023-07-22 RX ADMIN — METHOCARBAMOL 500 MG: 500 TABLET ORAL at 05:59

## 2023-07-22 RX ADMIN — SUCRALFATE 1 G: 1 TABLET ORAL at 12:40

## 2023-07-22 RX ADMIN — METHOCARBAMOL 500 MG: 500 TABLET ORAL at 12:40

## 2023-07-22 RX ADMIN — METOPROLOL TARTRATE 100 MG: 100 TABLET, FILM COATED ORAL at 08:54

## 2023-07-22 RX ADMIN — CHLORHEXIDINE GLUCONATE 15 ML: 1.2 RINSE ORAL at 08:54

## 2023-07-22 RX ADMIN — ACETAMINOPHEN 325MG 650 MG: 325 TABLET ORAL at 12:40

## 2023-07-22 RX ADMIN — METHOCARBAMOL 500 MG: 500 TABLET ORAL at 00:18

## 2023-07-22 RX ADMIN — POTASSIUM CHLORIDE 40 MEQ: 1500 TABLET, EXTENDED RELEASE ORAL at 17:57

## 2023-07-22 RX ADMIN — CEFTRIAXONE 1000 MG: 1 INJECTION, SOLUTION INTRAVENOUS at 05:58

## 2023-07-22 RX ADMIN — ONDANSETRON 4 MG: 2 INJECTION INTRAMUSCULAR; INTRAVENOUS at 06:08

## 2023-07-22 RX ADMIN — DILTIAZEM HYDROCHLORIDE 15 MG: 5 INJECTION INTRAVENOUS at 09:29

## 2023-07-22 RX ADMIN — SUCRALFATE 1 G: 1 TABLET ORAL at 06:08

## 2023-07-22 RX ADMIN — FUROSEMIDE 40 MG: 10 INJECTION, SOLUTION INTRAVENOUS at 12:42

## 2023-07-22 RX ADMIN — SUCRALFATE 1 G: 1 TABLET ORAL at 21:40

## 2023-07-22 RX ADMIN — SUCRALFATE 1 G: 1 TABLET ORAL at 17:57

## 2023-07-22 RX ADMIN — FLUTICASONE FUROATE AND VILANTEROL TRIFENATATE 1 PUFF: 200; 25 POWDER RESPIRATORY (INHALATION) at 08:57

## 2023-07-22 RX ADMIN — OXYCODONE HYDROCHLORIDE 10 MG: 10 TABLET ORAL at 19:56

## 2023-07-22 RX ADMIN — METHOCARBAMOL 500 MG: 500 TABLET ORAL at 17:57

## 2023-07-22 RX ADMIN — METOPROLOL TARTRATE 100 MG: 100 TABLET, FILM COATED ORAL at 21:40

## 2023-07-22 RX ADMIN — ACETAMINOPHEN 325MG 650 MG: 325 TABLET ORAL at 00:17

## 2023-07-22 RX ADMIN — POTASSIUM CHLORIDE 40 MEQ: 1500 TABLET, EXTENDED RELEASE ORAL at 12:40

## 2023-07-22 RX ADMIN — HEPARIN SODIUM 4000 UNITS: 1000 INJECTION INTRAVENOUS; SUBCUTANEOUS at 02:38

## 2023-07-22 RX ADMIN — DILTIAZEM HYDROCHLORIDE 10 MG: 5 INJECTION INTRAVENOUS at 17:34

## 2023-07-22 RX ADMIN — Medication: at 00:47

## 2023-07-22 RX ADMIN — ACETAMINOPHEN 325MG 650 MG: 325 TABLET ORAL at 05:59

## 2023-07-22 RX ADMIN — DILTIAZEM HYDROCHLORIDE 5 MG/HR: 5 INJECTION INTRAVENOUS at 22:12

## 2023-07-22 RX ADMIN — HEPARIN SODIUM 19.8 UNITS/KG/HR: 10000 INJECTION, SOLUTION INTRAVENOUS at 03:27

## 2023-07-22 RX ADMIN — DILTIAZEM HYDROCHLORIDE 10 MG: 5 INJECTION INTRAVENOUS at 12:42

## 2023-07-22 RX ADMIN — FAMOTIDINE 20 MG: 10 INJECTION INTRAVENOUS at 21:41

## 2023-07-22 RX ADMIN — ONDANSETRON 4 MG: 2 INJECTION INTRAMUSCULAR; INTRAVENOUS at 18:20

## 2023-07-22 RX ADMIN — DILTIAZEM HYDROCHLORIDE 10 MG: 5 INJECTION INTRAVENOUS at 20:38

## 2023-07-22 RX ADMIN — HYDROMORPHONE HYDROCHLORIDE 0.2 MG: 0.2 INJECTION, SOLUTION INTRAMUSCULAR; INTRAVENOUS; SUBCUTANEOUS at 20:27

## 2023-07-22 NOTE — PROGRESS NOTES
Progress Note - General Surgery   Tosha Dean 70 y.o. female MRN: [id number]  Unit/Bed#: E4 -01 Encounter: [id number]    Assessment:  70 F w/ pmh of HTN, HLD, afib, CAD, COPD, SMA thrombosis s/p stent presents with abdominal pain, hematochezia and c/f ischemic colitis. Now s/p diagnostic lap converted to ex lap with extended left hemicolectomy and primary anastomosis 7/19      Plan:  • Clears   • Continue heparin drip  • Cardiology recs appreciated  • Dilt, amio gtt  • SLIM recs appreciated  • Monitor electrolytes  • Continue abx  • IS  • OOBTID / PT/OT  • Continue telemetry  • Please TigerText on call [facility] surgery resident or AP with any questions       Subjective/Objective     Subjective:   NAEO. Passing gas. Tolerating clears. Requiring diltiazem and amiodarone for heart rate control due to A-fib    Objective:     Blood pressure 112/61, pulse (!) 145, temperature (!) 96.5 °F (35.8 °C), temperature source Temporal, resp. rate 20, height 5' 4" (1.626 m), weight 87.7 kg (193 lb 5.5 oz), SpO2 90 %, not currently breastfeeding. ,Body mass index is 33.19 kg/m². Intake/Output Summary (Last 24 hours) at 7/23/2023 0817  Last data filed at 7/23/2023 2998  Gross per 24 hour   Intake 480 ml   Output 1450 ml   Net -970 ml       Invasive Devices     Peripheral Intravenous Line  Duration           Peripheral IV 07/21/23 Proximal;Right;Ventral (anterior) Forearm 2 days    Peripheral IV 07/22/23 Left;Ventral (anterior) Forearm 1 day    Peripheral IV 07/23/23 Left;Proximal;Ventral (anterior) Forearm <1 day          Drain  Duration           External Urinary Catheter <1 day                Physical Exam:   Gen:  NAD. HEENT: NCAT. MMM. CV: well perfused, pulses palpable  Lungs: Normal respiratory effort  Abd: soft, nt/nd, incisions cdi  Skin: warm/ dry  Extremities: no peripheral edema, no cyanosis  Neuro:  AxO x3

## 2023-07-22 NOTE — ASSESSMENT & PLAN NOTE
• Follows with Cardiologist, Dr. Jose Ballard  • Eliquis on hold in favor of heparin IV due to postoperative state  • Cardiology is on board and following  • Continue metoprolol with hold parameters

## 2023-07-22 NOTE — PROGRESS NOTES
Progress Note - General Surgery   Ivone Ley 70 y.o. female MRN: [id number]  Unit/Bed#: E4 -01 Encounter: [id number]    Assessment:  70 F w/ pmh of HTN, HLD, afib, CAD, COPD, SMA thrombosis s/p stent presents with abdominal pain, hematochezia and c/f ischemic colitis. Now s/p diagnostic lap converted to ex lap with extended left hemicolectomy and primary anastomosis 7/19    Intermittent A-fib with rates improved  Normotensive        Plan:  • Clear liquid diet, can add toast  • Maintenance IV fluids  • Continue heparin drip  • Cardiology recs appreciated  • We will continue antibiotics through postop day 4  • Pain and nausea control as needed; continue fentanyl PCA  • IS  • OOB  • PT/OT  • Continue telemetry  • Please TigerText on call [facility] surgery resident or AP with any questions       Subjective/Objective     Subjective:   Pain better controlled. Some nausea no vomiting. Passing gas no bowel movements. Objective:     Blood pressure 135/85, pulse 104, temperature (!) 96.4 °F (35.8 °C), temperature source Temporal, resp. rate 18, height 5' 4" (1.626 m), weight 87.7 kg (193 lb 5.5 oz), SpO2 94 %, not currently breastfeeding. ,Body mass index is 33.19 kg/m².       Intake/Output Summary (Last 24 hours) at 7/21/2023 2229  Last data filed at 7/21/2023 3702  Gross per 24 hour   Intake 1362 ml   Output 550 ml   Net 812 ml       Invasive Devices     Peripheral Intravenous Line  Duration           Peripheral IV 07/19/23 Left Hand 1 day    Peripheral IV 07/21/23 Proximal;Right;Ventral (anterior) Forearm <1 day          Drain  Duration           External Urinary Catheter <1 day                Physical Exam:   Gen: NAD, Comfortable  Neuro: A&O, No focal deficits  Head: Normal Cephalic, Atraumatic  Eye: EOMI, PERRLA, No scleral icterus  Neck: Supple, No JVD, Midline trachea  CV: RRR, Cap refill <2 sec  Pulm: Normal work of breathing, no respiratory distress  Abd: Soft, Non-Distended, appropriately tender  Ext: No edema, Non-tender  Skin: warm, dry, intact

## 2023-07-22 NOTE — PLAN OF CARE
Problem: PAIN - ADULT  Goal: Verbalizes/displays adequate comfort level or baseline comfort level  Description: Interventions:  - Encourage patient to monitor pain and request assistance  - Assess pain using appropriate pain scale  - Administer analgesics based on type and severity of pain and evaluate response  - Implement non-pharmacological measures as appropriate and evaluate response  - Consider cultural and social influences on pain and pain management  - Notify physician/advanced practitioner if interventions unsuccessful or patient reports new pain  Outcome: Progressing     Problem: INFECTION - ADULT  Goal: Absence or prevention of progression during hospitalization  Description: INTERVENTIONS:  - Assess and monitor for signs and symptoms of infection  - Monitor lab/diagnostic results  - Monitor all insertion sites, i.e. indwelling lines, tubes, and drains  - Monitor endotracheal if appropriate and nasal secretions for changes in amount and color  - [place] appropriate cooling/warming therapies per order  - Administer medications as ordered  - Instruct and encourage patient and family to use good hand hygiene technique  - Identify and instruct in appropriate isolation precautions for identified infection/condition  Outcome: Progressing  Goal: Absence of fever/infection during neutropenic period  Description: INTERVENTIONS:  - Monitor WBC    Outcome: Progressing     Problem: SAFETY ADULT  Goal: Patient will remain free of falls  Description: INTERVENTIONS:  - Educate patient/family on patient safety including physical limitations  - Instruct patient to call for assistance with activity   - Consult OT/PT to assist with strengthening/mobility   - Keep Call bell within reach  - Keep bed low and locked with side rails adjusted as appropriate  - Keep care items and personal belongings within reach  - Initiate and maintain comfort rounds  - Make Fall Risk Sign visible to staff  - Offer Toileting every  Hours, in advance of need  - Initiate/Maintain alarm  - Obtain necessary fall risk management equipment:   - Apply yellow socks and bracelet for high fall risk patients  - Consider moving patient to room near nurses station  Outcome: Progressing  Goal: Maintain or return to baseline ADL function  Description: INTERVENTIONS:  -  Assess patient's ability to carry out ADLs; assess patient's baseline for ADL function and identify physical deficits which impact ability to perform ADLs (bathing, care of mouth/teeth, toileting, grooming, dressing, etc.)  - Assess/evaluate cause of self-care deficits   - Assess range of motion  - Assess patient's mobility; develop plan if impaired  - Assess patient's need for assistive devices and provide as appropriate  - Encourage maximum independence but intervene and supervise when necessary  - Involve family in performance of ADLs  - Assess for home care needs following discharge   - Consider OT consult to assist with ADL evaluation and planning for discharge  - Provide patient education as appropriate  Outcome: Progressing  Goal: Maintains/Returns to pre admission functional level  Description: INTERVENTIONS:  - Perform BMAT or MOVE assessment daily.   - Set and communicate daily mobility goal to care team and patient/family/caregiver. - Collaborate with rehabilitation services on mobility goals if consulted  - Perform Range of Motion  times a day. - Reposition patient every  hours.   - Dangle patient  times a day  - Stand patient  times a day  - Ambulate patient  times a day  - Out of bed to chair  times a day   - Out of bed for meals  times a day  - Out of bed for toileting  - Record patient progress and toleration of activity level   Outcome: Progressing     Problem: DISCHARGE PLANNING  Goal: Discharge to home or other facility with appropriate resources  Description: INTERVENTIONS:  - Identify barriers to discharge w/patient and caregiver  - Arrange for needed discharge resources and transportation as appropriate  - Identify discharge learning needs (meds, wound care, etc.)  - Arrange for interpretive services to assist at discharge as needed  - Refer to Case Management Department for coordinating discharge planning if the patient needs post-hospital services based on physician/advanced practitioner order or complex needs related to functional status, cognitive ability, or social support system  Outcome: Progressing     Problem: Knowledge Deficit  Goal: Patient/family/caregiver demonstrates understanding of disease process, treatment plan, medications, and discharge instructions  Description: Complete learning assessment and assess knowledge base.   Interventions:  - Provide teaching at level of understanding  - Provide teaching via preferred learning methods  Outcome: Progressing     Problem: Prexisting or High Potential for Compromised Skin Integrity  Goal: Skin integrity is maintained or improved  Description: INTERVENTIONS:  - Identify patients at risk for skin breakdown  - Assess and monitor skin integrity  - Assess and monitor nutrition and hydration status  - Monitor labs   - Assess for incontinence   - Turn and reposition patient  - Assist with mobility/ambulation  - Relieve pressure over bony prominences  - Avoid friction and shearing  - Provide appropriate hygiene as needed including keeping skin clean and dry  - Evaluate need for skin moisturizer/barrier cream  - Collaborate with interdisciplinary team   - Patient/family teaching  - Consider wound care consult   Outcome: Progressing     Problem: MOBILITY - ADULT  Goal: Maintain or return to baseline ADL function  Description: INTERVENTIONS:  -  Assess patient's ability to carry out ADLs; assess patient's baseline for ADL function and identify physical deficits which impact ability to perform ADLs (bathing, care of mouth/teeth, toileting, grooming, dressing, etc.)  - Assess/evaluate cause of self-care deficits   - Assess range of motion  - Assess patient's mobility; develop plan if impaired  - Assess patient's need for assistive devices and provide as appropriate  - Encourage maximum independence but intervene and supervise when necessary  - Involve family in performance of ADLs  - Assess for home care needs following discharge   - Consider OT consult to assist with ADL evaluation and planning for discharge  - Provide patient education as appropriate  Outcome: Progressing  Goal: Maintains/Returns to pre admission functional level  Description: INTERVENTIONS:  - Perform BMAT or MOVE assessment daily.   - Set and communicate daily mobility goal to care team and patient/family/caregiver. - Collaborate with rehabilitation services on mobility goals if consulted  - Perform Range of Motion  times a day. - Reposition patient every hours. - Dangle patient  times a day  - Stand patient  times a day  - Ambulate patient  times a day  - Out of bed to chair mes a day   - Out of bed for meals  times a day  - Out of bed for toileting  - Record patient progress and toleration of activity level   Outcome: Progressing     Problem: Nutrition/Hydration-ADULT  Goal: Nutrient/Hydration intake appropriate for improving, restoring or maintaining nutritional needs  Description: Monitor and assess patient's nutrition/hydration status for malnutrition. Collaborate with interdisciplinary team and initiate plan and interventions as ordered. Monitor patient's weight and dietary intake as ordered or per policy. Utilize nutrition screening tool and intervene as necessary. Determine patient's food preferences and provide high-protein, high-caloric foods as appropriate.      INTERVENTIONS:  - Monitor oral intake, urinary output, labs, and treatment plans  - Assess nutrition and hydration status and recommend course of action  - Evaluate amount of meals eaten  - Assist patient with eating if necessary   - Allow adequate time for meals  - Recommend/ encourage appropriate diets, oral nutritional supplements, and vitamin/mineral supplements  - Order, calculate, and assess calorie counts as needed  - Recommend, monitor, and adjust tube feedings and TPN/PPN based on assessed needs  - Assess need for intravenous fluids  - Provide specific nutrition/hydration education as appropriate  - Include patient/family/caregiver in decisions related to nutrition  Outcome: Progressing

## 2023-07-22 NOTE — ASSESSMENT & PLAN NOTE
· Followed by Neurology  · Suspected to be exacerbated by untreated CAMILLE:  Referred for sleep study   · Resume home diamox when tolerating p.o. consistently

## 2023-07-22 NOTE — PROGRESS NOTES
Progress Note - Cardiology   Amador Duran 70 y.o. female MRN: [id number]  Unit/Bed#: E4 -01 Encounter: [id number]        Problem List:  Principal Problem:    Colonic ischemia ([address])  Active Problems:    Essential hypertension    Paroxysmal atrial fibrillation (HCC)    COPD (chronic obstructive pulmonary disease) (HCC)    Colitis    Chronic migraine w/o aura w/o status migrainosus, not intractable    IIH (idiopathic intracranial hypertension)    Hematochezia    Left carotid stenosis    Acute postoperative pain      Assessment:  Colonic ischemia, s/p left hemicolectomy with primary anastomosis 7/19/2020  Hematochezia  Colitis  L carotid artery stenosis  Idiopathic intracranial hypertension   Chronic migraine w/o aura  COPD  HTN  Lower extremity edema for which she takes furosemide  Paroxysmal Afib, now with RVR postoperatively   -On Eliquis, diltiazem, Toprol-XL prehospital    Plan/ Discussion:  • Still tachycardic heart rate 100-140 however having 10/10 pain and hemoglobin down 3 points compared to 7/20/2023 (?dilutional). She does have brief paroxysms of sinus rhythm with HR in 70's and 80's, but mostly is in a-fib  • Potassium is low at 3.0  • Remains on 4 L nasal cannula and has some rales on exam  • Dose 40 IV Lasix today  • Dose 40 twice daily potassium today  • Use PRN IV Cardizem for now, likely add back PO Cardizem tomorrow  • Continue metoprolol at current dose  • Change heparin back to Eliquis once okay with surgery    Subjective:  Not feeling very well. Having 10/10 pain. Feeling short of breath having some swelling in her hands. No palpitations.     Vitals:  Vitals:    07/20/23 0531 07/21/23 0600   Weight: 86.2 kg (190 lb 0.6 oz) 87.7 kg (193 lb 5.5 oz)   ,  Vitals:    07/22/23 0350 07/22/23 0450 07/22/23 0550 07/22/23 0709   BP: 130/90 131/57 133/71 168/69   BP Location: Right arm Left arm Left arm Left arm   Pulse: 92 95 98 105   Resp: 20 18 18 18   Temp: 97.5 °F (36.4 °C) 97.8 °F (36.6 °C) 97.6 °F (36.4 °C) (!) 97.4 °F (36.3 °C)   TempSrc: Temporal Temporal Temporal Temporal   SpO2: 90% 95% 96% 96%   Weight:       Height:           Exam:  General: Alert awake and oriented, no acute distress  Heart:  Regular rate and rhythm, no murmurs, Normal S1, no edema    Respiratory effort/ Lungs: Somewhat dyspneic with conversation on 4 L, no wheezing, rales at lung bases  Abdominal: Non-tender to palpation, + bowel sounds, soft, no masses or distension  Lower Limbs:  No edema            Telemetry:       Atrial fibrillation, Heart Rate 100-130    Medications:    Current Facility-Administered Medications:   •  acetaminophen (TYLENOL) tablet 650 mg, 650 mg, Oral, Q6H [address], Jimbo Perez MD, 650 mg at 07/22/23 0559  •  aluminum-magnesium hydroxide-simethicone (MAALOX) oral suspension 30 mL, 30 mL, Oral, Q6H PRN, Jimbo Perez MD  •  cefTRIAXone (ROCEPHIN) IVPB (premix in dextrose) 1,000 mg 50 mL, 1,000 mg, Intravenous, Q24H, Jimbo Perez MD, Last Rate: 100 mL/hr at 07/22/23 0558, 1,000 mg at 07/22/23 0558  •  chlorhexidine (PERIDEX) 0.12 % oral rinse 15 mL, 15 mL, Mouth/Throat, Q12H [address], Jimbo Perze MD, 15 mL at 07/22/23 0854  •  diltiazem (CARDIZEM) injection 15 mg, 15 mg, Intravenous, Q8H PRN, Jimbo Perez MD, 15 mg at 07/22/23 1867  •  Famotidine (PF) (PEPCID) injection 20 mg, 20 mg, Intravenous, HS, Jimbo Perez MD, 20 mg at 07/21/23 2119  •  fentaNYL 10 mcg/mL PCA, , Intravenous, Continuous, Jimbo Perez MD, New Bag at 07/22/23 0047  •  fluticasone-vilanterol 200-25 mcg/actuation 1 puff, 1 puff, Inhalation, Daily, Jimbo Perez MD, 1 puff at 07/22/23 0857  •  heparin (porcine) 25,000 units in 0.45% NaCl 250 mL infusion (premix), 3-20 Units/kg/hr (Order-Specific), Intravenous, Titrated, Jimbo Perez MD, Last Rate: 16.8 mL/hr at 07/22/23 0327, 19.8 Units/kg/hr at 07/22/23 0327  •  heparin (porcine) injection 2,000 Units, 2,000 Units, Intravenous, Q6H PRN, Ira Chavez Ina Jasso MD  •  heparin (porcine) injection 4,000 Units, 4,000 Units, Intravenous, Q6H PRN, Shanell Barrera MD, 4,000 Units at 07/22/23 0238  •  methocarbamol (ROBAXIN) tablet 500 mg, 500 mg, Oral, Q6H [address], Shanell Barrera MD, 500 mg at 07/22/23 0559  •  metoprolol (LOPRESSOR) injection 10 mg, 10 mg, Intravenous, Q6H PRN, Shanell Barrera MD, 10 mg at 07/21/23 1443  •  metoprolol tartrate (LOPRESSOR) tablet 100 mg, 100 mg, Oral, Q12H [address], Shanell Barrera MD, 100 mg at 07/22/23 0854  •  naloxone (NARCAN) 0.04 mg/mL syringe 0.04 mg, 0.04 mg, Intravenous, Q1MIN PRN, Izzy Parekh MD  •  ondansetron [facility]) injection 4 mg, 4 mg, Intravenous, Q6H PRN, Shanell Barrera MD, 4 mg at 07/22/23 0608  •  polyethylene glycol (MIRALAX) packet 17 g, 17 g, Oral, Daily PRN, Shanell Barrera MD  •  sucralfate (CARAFATE) tablet 1 g, 1 g, Oral, 4x Daily (AC & HS), Shanell Barrera MD, 1 g at 07/22/23 1376      Labs/Data:        Results from last 7 days   Lab Units 07/22/23  0819 07/21/23  0559 07/20/23  0416   WBC Thousand/uL 11.70* 12.10* 15.73*   HEMOGLOBIN g/dL 9.4* 10.3* 12.3   HEMATOCRIT % 29.0* 30.9* 37.4   PLATELETS Thousands/uL 173 145* 177     Results from last 7 days   Lab Units 07/22/23  0819 07/20/23  0416 07/19/23  2347   POTASSIUM mmol/L 3.0* 3.3* 3.6   CHLORIDE mmol/L 105 112* 111*   CO2 mmol/L 27 18* 18*   BUN mg/dL 10 13 13   CREATININE mg/dL 0.47* 0.70 0.70

## 2023-07-22 NOTE — PLAN OF CARE
Problem: PAIN - ADULT  Goal: Verbalizes/displays adequate comfort level or baseline comfort level  Description: Interventions:  - Encourage patient to monitor pain and request assistance  - Assess pain using appropriate pain scale  - Administer analgesics based on type and severity of pain and evaluate response  - Implement non-pharmacological measures as appropriate and evaluate response  - Consider cultural and social influences on pain and pain management  - Notify physician/advanced practitioner if interventions unsuccessful or patient reports new pain  Outcome: Progressing     Problem: INFECTION - ADULT  Goal: Absence or prevention of progression during hospitalization  Description: INTERVENTIONS:  - Assess and monitor for signs and symptoms of infection  - Monitor lab/diagnostic results  - Monitor all insertion sites, i.e. indwelling lines, tubes, and drains  - Monitor endotracheal if appropriate and nasal secretions for changes in amount and color  - [place] appropriate cooling/warming therapies per order  - Administer medications as ordered  - Instruct and encourage patient and family to use good hand hygiene technique  - Identify and instruct in appropriate isolation precautions for identified infection/condition  Outcome: Progressing  Goal: Absence of fever/infection during neutropenic period  Description: INTERVENTIONS:  - Monitor WBC    Outcome: Progressing     Problem: SAFETY ADULT  Goal: Patient will remain free of falls  Description: INTERVENTIONS:  - Educate patient/family on patient safety including physical limitations  - Instruct patient to call for assistance with activity   - Consult OT/PT to assist with strengthening/mobility   - Keep Call bell within reach  - Keep bed low and locked with side rails adjusted as appropriate  - Keep care items and personal belongings within reach  - Initiate and maintain comfort rounds  - Make Fall Risk Sign visible to staff  - Offer Toileting every 2 Hours, in advance of need  - Initiate/Maintain bed alarm  - Obtain necessary fall risk management equipment:   - Apply yellow socks and bracelet for high fall risk patients  - Consider moving patient to room near nurses station  Outcome: Progressing  Goal: Maintain or return to baseline ADL function  Description: INTERVENTIONS:  -  Assess patient's ability to carry out ADLs; assess patient's baseline for ADL function and identify physical deficits which impact ability to perform ADLs (bathing, care of mouth/teeth, toileting, grooming, dressing, etc.)  - Assess/evaluate cause of self-care deficits   - Assess range of motion  - Assess patient's mobility; develop plan if impaired  - Assess patient's need for assistive devices and provide as appropriate  - Encourage maximum independence but intervene and supervise when necessary  - Involve family in performance of ADLs  - Assess for home care needs following discharge   - Consider OT consult to assist with ADL evaluation and planning for discharge  - Provide patient education as appropriate  Outcome: Progressing  Goal: Maintains/Returns to pre admission functional level  Description: INTERVENTIONS:  - Perform BMAT or MOVE assessment daily.   - Set and communicate daily mobility goal to care team and patient/family/caregiver. - Collaborate with rehabilitation services on mobility goals if consulted  - Perform Range of Motion 3 times a day. - Reposition patient every 3 hours.   - Dangle patient 3 times a day  - Stand patient 3 times a day  - Ambulate patient 3 times a day  - Out of bed to chair 3 times a day   - Out of bed for meals 3 times a day  - Out of bed for toileting  - Record patient progress and toleration of activity level   Outcome: Progressing     Problem: DISCHARGE PLANNING  Goal: Discharge to home or other facility with appropriate resources  Description: INTERVENTIONS:  - Identify barriers to discharge w/patient and caregiver  - Arrange for needed discharge resources and transportation as appropriate  - Identify discharge learning needs (meds, wound care, etc.)  - Arrange for interpretive services to assist at discharge as needed  - Refer to Case Management Department for coordinating discharge planning if the patient needs post-hospital services based on physician/advanced practitioner order or complex needs related to functional status, cognitive ability, or social support system  Outcome: Progressing     Problem: Knowledge Deficit  Goal: Patient/family/caregiver demonstrates understanding of disease process, treatment plan, medications, and discharge instructions  Description: Complete learning assessment and assess knowledge base.   Interventions:  - Provide teaching at level of understanding  - Provide teaching via preferred learning methods  Outcome: Progressing     Problem: Prexisting or High Potential for Compromised Skin Integrity  Goal: Skin integrity is maintained or improved  Description: INTERVENTIONS:  - Identify patients at risk for skin breakdown  - Assess and monitor skin integrity  - Assess and monitor nutrition and hydration status  - Monitor labs   - Assess for incontinence   - Turn and reposition patient  - Assist with mobility/ambulation  - Relieve pressure over bony prominences  - Avoid friction and shearing  - Provide appropriate hygiene as needed including keeping skin clean and dry  - Evaluate need for skin moisturizer/barrier cream  - Collaborate with interdisciplinary team   - Patient/family teaching  - Consider wound care consult   Outcome: Progressing     Problem: MOBILITY - ADULT  Goal: Maintain or return to baseline ADL function  Description: INTERVENTIONS:  -  Assess patient's ability to carry out ADLs; assess patient's baseline for ADL function and identify physical deficits which impact ability to perform ADLs (bathing, care of mouth/teeth, toileting, grooming, dressing, etc.)  - Assess/evaluate cause of self-care deficits   - Assess range of motion  - Assess patient's mobility; develop plan if impaired  - Assess patient's need for assistive devices and provide as appropriate  - Encourage maximum independence but intervene and supervise when necessary  - Involve family in performance of ADLs  - Assess for home care needs following discharge   - Consider OT consult to assist with ADL evaluation and planning for discharge  - Provide patient education as appropriate  Outcome: Progressing  Goal: Maintains/Returns to pre admission functional level  Description: INTERVENTIONS:  - Perform BMAT or MOVE assessment daily.   - Set and communicate daily mobility goal to care team and patient/family/caregiver. - Collaborate with rehabilitation services on mobility goals if consulted  - Perform Range of Motion 3 times a day. - Reposition patient every 3 hours. - Dangle patient 3 times a day  - Stand patient 3 times a day  - Ambulate patient 3 times a day  - Out of bed to chair 3 times a day   - Out of bed for meals 3 times a day  - Out of bed for toileting  - Record patient progress and toleration of activity level   Outcome: Progressing     Problem: Nutrition/Hydration-ADULT  Goal: Nutrient/Hydration intake appropriate for improving, restoring or maintaining nutritional needs  Description: Monitor and assess patient's nutrition/hydration status for malnutrition. Collaborate with interdisciplinary team and initiate plan and interventions as ordered. Monitor patient's weight and dietary intake as ordered or per policy. Utilize nutrition screening tool and intervene as necessary. Determine patient's food preferences and provide high-protein, high-caloric foods as appropriate.      INTERVENTIONS:  - Monitor oral intake, urinary output, labs, and treatment plans  - Assess nutrition and hydration status and recommend course of action  - Evaluate amount of meals eaten  - Assist patient with eating if necessary   - Allow adequate time for meals  - Recommend/ encourage appropriate diets, oral nutritional supplements, and vitamin/mineral supplements  - Order, calculate, and assess calorie counts as needed  - Recommend, monitor, and adjust tube feedings and TPN/PPN based on assessed needs  - Assess need for intravenous fluids  - Provide specific nutrition/hydration education as appropriate  - Include patient/family/caregiver in decisions related to nutrition  Outcome: Progressing

## 2023-07-22 NOTE — ASSESSMENT & PLAN NOTE
· Medical management.   · Resume aspirin when okay with primary service  Background:  · Pt was admitted 9/22 on the stroke pathway:  MRI negative and sx were attributed to migrane and discharged on ASA and eliquis  · She was diagnosed with left ICA stenosis 80% and underwent unsuccessful CEA with subsequent left facial numbness and left facial asymmetry  · Admitted 2/23 with left facial droop and BUE paresthesias with negative stroke work up that admission

## 2023-07-22 NOTE — ASSESSMENT & PLAN NOTE
· Status post laparotomy, colon resection, anastomosis on 7/19/2023  · Intraoperatively, she was found to have ischemia of the transverse, distal and sigmoid colon  · Postop management and pain control per primary service

## 2023-07-22 NOTE — PROGRESS NOTES
[facility]  Progress Note  Name: Alexandra Hodge  MRN: [id number]  Unit/Bed#: E4 -01 I Date of Admission: 7/18/2023   Date of Service: 7/22/2023 I Hospital Day: 3    Assessment/Plan   * Colonic ischemia [facility])  Assessment & Plan  · Status post laparotomy, colon resection, anastomosis on 7/19/2023  · Intraoperatively, she was found to have ischemia of the transverse, distal and sigmoid colon  · Postop management and pain control per primary service    Paroxysmal atrial fibrillation [facility])  Assessment & Plan  • Follows with Cardiologist, Dr. Omega Sellers  • Eliquis on hold in favor of heparin IV due to postoperative state  • Cardiology is on board and following  • Continue metoprolol with hold parameters      COPD (chronic obstructive pulmonary disease) ([address])  Assessment & Plan  Stable without exacerbation. Continue fluticasone vilanterol 1 puff daily  Albuterol as needed    Essential hypertension  Assessment & Plan  · Continue metoprolol with hold parameters   · Pain control via PCA    Left carotid stenosis  Assessment & Plan  · Medical management. · Resume aspirin when okay with primary service  Background:  · Pt was admitted 9/22 on the stroke pathway:  MRI negative and sx were attributed to migrane and discharged on ASA and eliquis  · She was diagnosed with left ICA stenosis 80% and underwent unsuccessful CEA with subsequent left facial numbness and left facial asymmetry  · Admitted 2/23 with left facial droop and BUE paresthesias with negative stroke work up that admission      IIH (idiopathic intracranial hypertension)  Assessment & Plan  · Followed by Neurology  · Suspected to be exacerbated by untreated CAMILLE:  Referred for sleep study   · Resume home diamox when tolerating p.o. consistently           VTE Pharmacologic Prophylaxis: VTE Score: 3 Moderate Risk (Score 3-4) - Pharmacological DVT Prophylaxis Ordered: heparin drip.     Patient Centered Rounds: I performed bedside rounds with nursing staff today. Discussions with Specialists or Other Care Team Provider: Hospital course reviewed    Current Length of Stay: 3 day(s)  Current Patient Status: Inpatient   Certification Statement: The patient will continue to require additional inpatient hospital stay due to Postoperative state  Discharge Plan: Per primary service    Code Status: Level 1 - Full Code    Subjective:   + Left lower quadrant pain  + Admitted passing gas  No fever or chills    Objective:     Vitals:   Temp (24hrs), Av.4 °F (36.3 °C), Min:96.4 °F (35.8 °C), Max:97.8 °F (36.6 °C)    Temp:  [96.4 °F (35.8 °C)-97.8 °F (36.6 °C)] 97.4 °F (36.3 °C)  HR:  [] 111  Resp:  [16-20] 18  BP: (120-168)/(57-90) 138/66  SpO2:  [90 %-99 %] 99 %  Body mass index is 33.19 kg/m². Input and Output Summary (last 24 hours): Intake/Output Summary (Last 24 hours) at 2023 1311  Last data filed at 2023 0501  Gross per 24 hour   Intake 440 ml   Output 250 ml   Net 190 ml       Physical Exam:   Physical Exam  Vitals reviewed. Constitutional:       Appearance: She is obese. She is ill-appearing. HENT:      Head: Normocephalic and atraumatic. Nose: No congestion or rhinorrhea. Cardiovascular:      Rate and Rhythm: Regular rhythm. Pulmonary:      Breath sounds: No stridor. No wheezing or rhonchi. Comments: Diminished at the bases  Abdominal:      Comments:  hypoactive bowel sounds   Musculoskeletal:      Right lower leg: No edema. Left lower leg: No edema. Skin:     General: Skin is warm and dry. Coloration: Skin is not jaundiced or pale. Neurological:      Mental Status: She is oriented to person, place, and time.    Psychiatric:         Mood and Affect: Mood normal.         Behavior: Behavior normal.         Additional Data:     Labs:  Results from last 7 days   Lab Units 23  0819 23  0559 23  0416   WBC Thousand/uL 11.70*   < > 15.73*   HEMOGLOBIN g/dL 9.4*   < > 12.3 HEMATOCRIT % 29.0*   < > 37.4   PLATELETS Thousands/uL 173   < > 177   BANDS PCT %  --   --  31*   NEUTROS PCT % 81*  --   --    LYMPHS PCT % 11*  --   --    LYMPHO PCT %  --   --  4*   MONOS PCT % 4  --   --    MONO PCT %  --   --  0*   EOS PCT % 3  --  0    < > = values in this interval not displayed. Results from last 7 days   Lab Units 07/22/23  0819 07/19/23  0728 07/18/23  2244   SODIUM mmol/L 139   < > 138   POTASSIUM mmol/L 3.0*   < > 3.3*   CHLORIDE mmol/L 105   < > 105   CO2 mmol/L 27   < > 19*   BUN mg/dL 10   < > 18   CREATININE mg/dL 0.47*   < > 0.99   ANION GAP mmol/L 7   < > 14   CALCIUM mg/dL 8.0*   < > 9.5   ALBUMIN g/dL  --   --  4.7   TOTAL BILIRUBIN mg/dL  --   --  0.47   ALK PHOS U/L  --   --  118*   ALT U/L  --   --  14   AST U/L  --   --  17   GLUCOSE RANDOM mg/dL 93   < > 149*    < > = values in this interval not displayed. Results from last 7 days   Lab Units 07/21/23  0559   INR  1.49*             Results from last 7 days   Lab Units 07/20/23  0318 07/19/23  2347 07/19/23 2011 07/19/23  1512 07/19/23  1054 07/19/23  0728   LACTIC ACID mmol/L 1.7 2.8* 1.4 3.0*   < > 4.0*   PROCALCITONIN ng/ml  --   --   --   --   --  0.42*    < > = values in this interval not displayed.        Lines/Drains:  Invasive Devices     Peripheral Intravenous Line  Duration           Peripheral IV 07/21/23 Proximal;Right;Ventral (anterior) Forearm 1 day    Peripheral IV 07/22/23 Left;Ventral (anterior) Forearm <1 day    Peripheral IV 07/22/23 Right;Ventral (anterior) Forearm <1 day                  Telemetry:  Telemetry Orders (From admission, onward)             Telemetry Monitoring  Continuous x 24 Hours (Telem)        Question:  Reason for 24 Hour Telemetry  Answer:  Arrhythmias requiring acute medical intervention / PPM or ICD malfunction                          Imaging: Reviewed radiology reports from this admission including: procedure reports    Recent Cultures (last 7 days):         Last 24 Hours Medication List:   Current Facility-Administered Medications   Medication Dose Route Frequency Provider Last Rate   • acetaminophen  650 mg Oral Q6H [address] Sasha Montero MD     • aluminum-magnesium hydroxide-simethicone  30 mL Oral Q6H PRN Sasha Montero MD     • cefTRIAXone  1,000 mg Intravenous Q24H Sasha Montero MD 1,000 mg (07/22/23 0558)   • chlorhexidine  15 mL Mouth/Throat Q12H [address] Sasha Montero MD     • diltiazem  10 mg Intravenous Q3H PRN [facility], PA-C     • famotidine  20 mg Intravenous HS Sasha Montero MD     • fentaNYL   Intravenous Continuous Sasha Montero MD     • fluticasone-vilanterol  1 puff Inhalation Daily Sasha Montero MD     • heparin (porcine)  3-20 Units/kg/hr (Order-Specific) Intravenous Titrated Sasha Montero MD 20 Units/kg/hr (07/22/23 1247)   • heparin (porcine)  2,000 Units Intravenous Q6H PRN Sasha Montero MD     • heparin (porcine)  4,000 Units Intravenous Q6H PRN Sasha Montero MD     • methocarbamol  500 mg Oral Q6H [address] Sasha Montero MD     • metoprolol  10 mg Intravenous Q6H PRN Sasha Montero MD     • metoprolol tartrate  100 mg Oral Q12H [address] Sasha Montero MD     • naloxone  0.04 mg Intravenous Q1MIN PRN Mekhi Lacy MD     • ondansetron  4 mg Intravenous Q6H PRN Sasha Montero MD     • polyethylene glycol  17 g Oral Daily PRN Sasha Montero MD     • potassium chloride  40 mEq Oral BID [facility], PA-C     • sucralfate  1 g Oral 4x Daily (AC & HS) Sasha Montero MD          Today, Patient Was Seen By: Marco Antonio Dos Santos MD    **Please Note: This note may have been constructed using a voice recognition system. **

## 2023-07-22 NOTE — CASE MANAGEMENT
Case Management Discharge Planning Note    Patient name Donavon Calhoun  Location [id number] [place] [address] [place] [address] [place] [address]-* MRN [id number]  : 1960 Date 2023       Current Admission Date: 2023  Current Admission Diagnosis:Colonic ischemia [facility])   Patient Active Problem List    Diagnosis Date Noted   • Acute postoperative pain 2023   • Colitis 2023   • Chronic migraine w/o aura w/o status migrainosus, not intractable 2023   • IIH (idiopathic intracranial hypertension) 2023   • Hematochezia 2023   • Left carotid stenosis 2023   • Colonic ischemia ([address]) 2023   • Injury of left facial nerve 2023   • Partial facial nerve palsy 2023   • Hepatic steatosis 2022   • Asymptomatic stenosis of left carotid artery 10/05/2022   • Stenosis of left carotid artery 2022   • Carotid artery stenosis 2022   • Appendix disease 2022   • Urinary retention 2022   • Peripheral vascular disease ([address]) 2022   • 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   • COPD (chronic obstructive pulmonary disease) ([address]) 2021   • Unspecified diastolic (congestive) heart failure ([address]) 2021   • Hypertensive heart disease with congestive heart failure ([address]) 2020   • CAMILLE (obstructive sleep apnea)    • Lumbar radiculopathy 10/01/2020   • Paroxysmal atrial fibrillation ([address]) 2020   • Allergic conjunctivitis of both eyes 2020   • Angio-edema 2020   • Gastroesophageal reflux disease without esophagitis 2019   • Allergic reaction 2018   • AMD (age-related macular degeneration), bilateral 2018   • Angina pectoris ([address]) 11/15/2017   • Migraines 10/14/2016   • Essential hypertension 2015      LOS (days): 3  Geometric Mean LOS (GMLOS) (days): 5.30  Days to GMLOS:2     OBJECTIVE:  Risk of Unplanned Readmission Score: 26.45         Current admission status: Inpatient   Preferred Pharmacy:   [facility] - [place] - [address]  Phone: [phone] Fax: [phone]    [address] [place] [address] 533 [facility]  Phone: [phone] Fax: [phone] [facility] [address] [facility],  [address] [facility],  [address] [place] [address]  Phone: [phone] Fax: [phone]    Primary Care Provider: Natasha Gusman DO    Primary Insurance: Jaron Hernandez [facility] RE[facility]  Secondary Insurance:     DISCHARGE DETAILS:                  Additional Comments: LSW covering Saturday. PT/OT are now recommending IP Rehab.  [place] referrals made and need to f/u with pt to see if she is agreeable to going to IP Rehab and give choice list.

## 2023-07-23 LAB
ANION GAP SERPL CALCULATED.3IONS-SCNC: 7 MMOL/L
APTT PPP: 54 SECONDS (ref 23–37)
APTT PPP: 67 SECONDS (ref 23–37)
APTT PPP: 68 SECONDS (ref 23–37)
BASOPHILS # BLD AUTO: 0.04 THOUSANDS/ÂΜL (ref 0–0.1)
BASOPHILS NFR BLD AUTO: 0 % (ref 0–1)
BUN SERPL-MCNC: 12 MG/DL (ref 5–25)
CA-I BLD-SCNC: 1.01 MMOL/L (ref 1.12–1.32)
CALCIUM SERPL-MCNC: 7.5 MG/DL (ref 8.4–10.2)
CHLORIDE SERPL-SCNC: 106 MMOL/L (ref 96–108)
CO2 SERPL-SCNC: 26 MMOL/L (ref 21–32)
CREAT SERPL-MCNC: 0.49 MG/DL (ref 0.6–1.3)
EOSINOPHIL # BLD AUTO: 0.37 THOUSAND/ÂΜL (ref 0–0.61)
EOSINOPHIL NFR BLD AUTO: 4 % (ref 0–6)
ERYTHROCYTE [DISTWIDTH] IN BLOOD BY AUTOMATED COUNT: 13.3 % (ref 11.6–15.1)
GFR SERPL CREATININE-BSD FRML MDRD: 102 ML/MIN/1.73SQ M
GLUCOSE SERPL-MCNC: 77 MG/DL (ref 65–140)
GLUCOSE SERPL-MCNC: 95 MG/DL (ref 65–140)
HCT VFR BLD AUTO: 27.6 % (ref 34.8–46.1)
HGB BLD-MCNC: 8.8 G/DL (ref 11.5–15.4)
IMM GRANULOCYTES # BLD AUTO: 0.06 THOUSAND/UL (ref 0–0.2)
IMM GRANULOCYTES NFR BLD AUTO: 1 % (ref 0–2)
LYMPHOCYTES # BLD AUTO: 1.75 THOUSANDS/ÂΜL (ref 0.6–4.47)
LYMPHOCYTES NFR BLD AUTO: 18 % (ref 14–44)
MAGNESIUM SERPL-MCNC: 2.1 MG/DL (ref 1.9–2.7)
MCH RBC QN AUTO: 29.6 PG (ref 26.8–34.3)
MCHC RBC AUTO-ENTMCNC: 31.9 G/DL (ref 31.4–37.4)
MCV RBC AUTO: 93 FL (ref 82–98)
MONOCYTES # BLD AUTO: 0.55 THOUSAND/ÂΜL (ref 0.17–1.22)
MONOCYTES NFR BLD AUTO: 6 % (ref 4–12)
NEUTROPHILS # BLD AUTO: 6.99 THOUSANDS/ÂΜL (ref 1.85–7.62)
NEUTS SEG NFR BLD AUTO: 71 % (ref 43–75)
NRBC BLD AUTO-RTO: 0 /100 WBCS
PHOSPHATE SERPL-MCNC: 1.9 MG/DL (ref 2.3–4.1)
PLATELET # BLD AUTO: 200 THOUSANDS/UL (ref 149–390)
PMV BLD AUTO: 9.9 FL (ref 8.9–12.7)
POTASSIUM SERPL-SCNC: 3.5 MMOL/L (ref 3.5–5.3)
RBC # BLD AUTO: 2.97 MILLION/UL (ref 3.81–5.12)
SODIUM SERPL-SCNC: 139 MMOL/L (ref 135–147)
WBC # BLD AUTO: 9.76 THOUSAND/UL (ref 4.31–10.16)

## 2023-07-23 PROCEDURE — 85730 THROMBOPLASTIN TIME PARTIAL: CPT | Performed by: INTERNAL MEDICINE

## 2023-07-23 PROCEDURE — 85730 THROMBOPLASTIN TIME PARTIAL: CPT | Performed by: SURGERY

## 2023-07-23 PROCEDURE — 99233 SBSQ HOSP IP/OBS HIGH 50: CPT | Performed by: SURGERY

## 2023-07-23 PROCEDURE — 82948 REAGENT STRIP/BLOOD GLUCOSE: CPT

## 2023-07-23 PROCEDURE — 82330 ASSAY OF CALCIUM: CPT | Performed by: INTERNAL MEDICINE

## 2023-07-23 PROCEDURE — 85025 COMPLETE CBC W/AUTO DIFF WBC: CPT | Performed by: INTERNAL MEDICINE

## 2023-07-23 PROCEDURE — 99231 SBSQ HOSP IP/OBS SF/LOW 25: CPT | Performed by: INTERNAL MEDICINE

## 2023-07-23 PROCEDURE — 99233 SBSQ HOSP IP/OBS HIGH 50: CPT | Performed by: INTERNAL MEDICINE

## 2023-07-23 PROCEDURE — 80048 BASIC METABOLIC PNL TOTAL CA: CPT | Performed by: INTERNAL MEDICINE

## 2023-07-23 PROCEDURE — 83735 ASSAY OF MAGNESIUM: CPT | Performed by: INTERNAL MEDICINE

## 2023-07-23 PROCEDURE — 84100 ASSAY OF PHOSPHORUS: CPT | Performed by: INTERNAL MEDICINE

## 2023-07-23 RX ORDER — CALCIUM GLUCONATE 20 MG/ML
2 INJECTION, SOLUTION INTRAVENOUS ONCE
Status: COMPLETED | OUTPATIENT
Start: 2023-07-23 | End: 2023-07-23

## 2023-07-23 RX ORDER — DIPHENHYDRAMINE HYDROCHLORIDE 50 MG/ML
25 INJECTION INTRAMUSCULAR; INTRAVENOUS
Status: DISCONTINUED | OUTPATIENT
Start: 2023-07-23 | End: 2023-07-28 | Stop reason: HOSPADM

## 2023-07-23 RX ORDER — FAMOTIDINE 20 MG/1
20 TABLET, FILM COATED ORAL
Status: DISCONTINUED | OUTPATIENT
Start: 2023-07-23 | End: 2023-07-28 | Stop reason: HOSPADM

## 2023-07-23 RX ORDER — FUROSEMIDE 40 MG/1
40 TABLET ORAL DAILY
Status: DISCONTINUED | OUTPATIENT
Start: 2023-07-23 | End: 2023-07-25

## 2023-07-23 RX ORDER — ALBUMIN (HUMAN) 12.5 G/50ML
12.5 SOLUTION INTRAVENOUS ONCE
Status: COMPLETED | OUTPATIENT
Start: 2023-07-23 | End: 2023-07-23

## 2023-07-23 RX ORDER — LORAZEPAM 1 MG/1
1 TABLET ORAL 3 TIMES DAILY PRN
Status: DISCONTINUED | OUTPATIENT
Start: 2023-07-23 | End: 2023-07-28 | Stop reason: HOSPADM

## 2023-07-23 RX ADMIN — METHOCARBAMOL 500 MG: 500 TABLET ORAL at 05:52

## 2023-07-23 RX ADMIN — CEFTRIAXONE 1000 MG: 1 INJECTION, SOLUTION INTRAVENOUS at 06:00

## 2023-07-23 RX ADMIN — OXYCODONE HYDROCHLORIDE 10 MG: 10 TABLET ORAL at 16:30

## 2023-07-23 RX ADMIN — SUCRALFATE 1 G: 1 TABLET ORAL at 05:51

## 2023-07-23 RX ADMIN — METOROPROLOL TARTRATE 10 MG: 5 INJECTION, SOLUTION INTRAVENOUS at 03:29

## 2023-07-23 RX ADMIN — ONDANSETRON 8 MG: 2 INJECTION INTRAMUSCULAR; INTRAVENOUS at 00:54

## 2023-07-23 RX ADMIN — POTASSIUM PHOSPHATE, MONOBASIC POTASSIUM PHOSPHATE, DIBASIC 21 MMOL: 224; 236 INJECTION, SOLUTION, CONCENTRATE INTRAVENOUS at 08:27

## 2023-07-23 RX ADMIN — ONDANSETRON 4 MG: 2 INJECTION INTRAMUSCULAR; INTRAVENOUS at 10:28

## 2023-07-23 RX ADMIN — SUCRALFATE 1 G: 1 TABLET ORAL at 22:00

## 2023-07-23 RX ADMIN — CALCIUM GLUCONATE 2 G: 20 INJECTION, SOLUTION INTRAVENOUS at 07:07

## 2023-07-23 RX ADMIN — SUCRALFATE 1 G: 1 TABLET ORAL at 16:30

## 2023-07-23 RX ADMIN — ALBUMIN (HUMAN) 12.5 G: 0.25 INJECTION, SOLUTION INTRAVENOUS at 05:21

## 2023-07-23 RX ADMIN — DILTIAZEM HYDROCHLORIDE 10 MG: 5 INJECTION INTRAVENOUS at 07:23

## 2023-07-23 RX ADMIN — FUROSEMIDE 40 MG: 40 TABLET ORAL at 11:57

## 2023-07-23 RX ADMIN — FAMOTIDINE 20 MG: 20 TABLET ORAL at 22:00

## 2023-07-23 RX ADMIN — AMIODARONE HYDROCHLORIDE 1 MG/MIN: 50 INJECTION, SOLUTION INTRAVENOUS at 06:08

## 2023-07-23 RX ADMIN — FLUTICASONE FUROATE AND VILANTEROL TRIFENATATE 1 PUFF: 200; 25 POWDER RESPIRATORY (INHALATION) at 08:35

## 2023-07-23 RX ADMIN — AMIODARONE HYDROCHLORIDE 0.5 MG/MIN: 50 INJECTION, SOLUTION INTRAVENOUS at 19:32

## 2023-07-23 RX ADMIN — METHOCARBAMOL 500 MG: 500 TABLET ORAL at 11:57

## 2023-07-23 RX ADMIN — MORPHINE SULFATE 4 MG: 4 INJECTION INTRAVENOUS at 00:45

## 2023-07-23 RX ADMIN — METOPROLOL TARTRATE 100 MG: 100 TABLET, FILM COATED ORAL at 08:28

## 2023-07-23 RX ADMIN — ONDANSETRON 4 MG: 2 INJECTION INTRAMUSCULAR; INTRAVENOUS at 16:30

## 2023-07-23 RX ADMIN — SUCRALFATE 1 G: 1 TABLET ORAL at 11:57

## 2023-07-23 RX ADMIN — CHLORHEXIDINE GLUCONATE 15 ML: 1.2 RINSE ORAL at 20:15

## 2023-07-23 RX ADMIN — MORPHINE SULFATE 4 MG: 4 INJECTION INTRAVENOUS at 22:10

## 2023-07-23 RX ADMIN — ACETAMINOPHEN 325MG 650 MG: 325 TABLET ORAL at 00:47

## 2023-07-23 RX ADMIN — OXYCODONE HYDROCHLORIDE 10 MG: 10 TABLET ORAL at 07:23

## 2023-07-23 RX ADMIN — METHOCARBAMOL 500 MG: 500 TABLET ORAL at 19:32

## 2023-07-23 RX ADMIN — LORAZEPAM 1 MG: 1 TABLET ORAL at 02:17

## 2023-07-23 RX ADMIN — MORPHINE SULFATE 4 MG: 4 INJECTION INTRAVENOUS at 10:28

## 2023-07-23 RX ADMIN — METHOCARBAMOL 500 MG: 500 TABLET ORAL at 00:47

## 2023-07-23 RX ADMIN — HEPARIN SODIUM 2000 UNITS: 1000 INJECTION INTRAVENOUS; SUBCUTANEOUS at 05:45

## 2023-07-23 RX ADMIN — METOPROLOL TARTRATE 100 MG: 100 TABLET, FILM COATED ORAL at 20:14

## 2023-07-23 RX ADMIN — HEPARIN SODIUM 17.06 UNITS/KG/HR: 10000 INJECTION, SOLUTION INTRAVENOUS at 00:44

## 2023-07-23 RX ADMIN — AMIODARONE HYDROCHLORIDE 150 MG: 50 INJECTION, SOLUTION INTRAVENOUS at 05:38

## 2023-07-23 RX ADMIN — ACETAMINOPHEN 325MG 650 MG: 325 TABLET ORAL at 19:31

## 2023-07-23 RX ADMIN — HEPARIN SODIUM 19 UNITS/KG/HR: 10000 INJECTION, SOLUTION INTRAVENOUS at 17:53

## 2023-07-23 RX ADMIN — ACETAMINOPHEN 325MG 650 MG: 325 TABLET ORAL at 11:57

## 2023-07-23 NOTE — CASE MANAGEMENT
Case Management Discharge Planning Note    Patient name Frederick Duval  Location [place] [address] [id number] [place], [place] [address] [place] [address]-* MRN [id number]  : 1960 Date 2023       Current Admission Date: 2023  Current Admission Diagnosis:Colonic ischemia [facility])   Patient Active Problem List    Diagnosis Date Noted   • Acute postoperative pain 2023   • Colitis 2023   • Chronic migraine w/o aura w/o status migrainosus, not intractable 2023   • IIH (idiopathic intracranial hypertension) 2023   • Hematochezia 2023   • Left carotid stenosis 2023   • Colonic ischemia ([address]) 2023   • Injury of left facial nerve 2023   • Partial facial nerve palsy 2023   • Hepatic steatosis 2022   • Asymptomatic stenosis of left carotid artery 10/05/2022   • Stenosis of left carotid artery 2022   • Carotid artery stenosis 2022   • Appendix disease 2022   • Urinary retention 2022   • Peripheral vascular disease ([address]) 2022   • 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   • COPD (chronic obstructive pulmonary disease) ([address]) 2021   • Unspecified diastolic (congestive) heart failure ([address]) 2021   • Hypertensive heart disease with congestive heart failure ([address]) 2020   • CAMILLE (obstructive sleep apnea)    • Lumbar radiculopathy 10/01/2020   • Paroxysmal atrial fibrillation ([address]) 2020   • Allergic conjunctivitis of both eyes 2020   • Angio-edema 2020   • Gastroesophageal reflux disease without esophagitis 2019   • Allergic reaction 2018   • AMD (age-related macular degeneration), bilateral 2018   • Angina pectoris ([address]) 11/15/2017   • Migraines 10/14/2016   • Essential hypertension 2015      LOS (days): 4  Geometric Mean LOS (GMLOS) (days): 5.30  Days to GMLOS:0.7     OBJECTIVE:  Risk of Unplanned Readmission Score: 30.77         Current admission status: Inpatient   Preferred Pharmacy:   [facility] - [place] - [address]  [address] [facility]  Phone: [phone] Fax: [phone] 365[address] [facility], [address] 7400 [facility]  110 [facility] [address] [facility] [address] [address]  Phone: [phone] Fax: 0001 [facility], [address] [facility],  [address] [facility],  [address] [address]  Phone: [phone] Fax: [phone]    Primary Care Provider: Nader Benton DO    Primary Insurance: [facility]  Secondary Insurance:     DISCHARGE DETAILS:    Discharge planning discussed with[de-identified] Patient  Freedom of Choice: Yes  Comments - Freedom of Choice: agreeable to go to [facility]  CM contacted family/caregiver?: No- see comments (declined at this time)  Were Treatment Team discharge recommendations reviewed with patient/caregiver?: Yes  Did patient/caregiver verbalize understanding of patient care needs?: N/A- going to facility  Were patient/caregiver advised of the risks associated with not following Treatment Team discharge recommendations?: Yes         Requested [facility]         Is the patient interested in [facility] at discharge?: No    DME Referral Provided  Referral made for DME?: No    Other Referral/Resources/Interventions Provided:  Interventions: Short Term Rehab         Treatment Team Recommendation: Short Term Rehab  Discharge Destination Plan[de-identified] Short Term Rehab  Transport at Discharge : [address] accepted. Needs one time contract with [facility]. Patient is not committed to this rehab yet. She wants to see if she has additional options, most likely in [facility]. CM provided her with the Patient Choice List to review.

## 2023-07-23 NOTE — ASSESSMENT & PLAN NOTE
· Status post laparotomy, colon resection, anastomosis on 7/19/2023  · Intraoperatively, she was found to have ischemia of the transverse, distal and sigmoid colon  · Postop management, diet and pain control per primary service

## 2023-07-23 NOTE — PROGRESS NOTES
The famotidine has been converted to Oral per [facility] IV-to-PO Auto-Conversion Protocol for Adults as approved by the Pharmacy and Therapeutics Committee. The patient met all eligible criteria:  3 Age = 25years old   2) Received at least one dose of the IV form   3) Receiving at least one other scheduled oral/enteral medication   4) Tolerating an oral/enteral diet   and did not have any exclusions:   1) Critical care patient   2) Active GI bleed (IF assessing H2RAs or PPIs)   3) Continuous tube feeding (IF assessing cipro, doxycycline, levofloxacin, minocycline, rifampin, or voriconazole)   4) Receiving PO vancomycin (IF assessing metronidazole)   5) Persistent nausea and/or vomiting   6) Ileus or gastrointestinal obstruction   7) Reina/nasogastric tube set for continuous suction   8) Specific order not to automatically convert to PO (in the order's comments or if discussed in the most recent Infectious Disease or primary team's progress notes).

## 2023-07-23 NOTE — QUICK NOTE
Notified by RN persistent Afib RVR rate 160-170s, given cardizem bolus with no improvement  · Received IV cardizem bolus x3 today  · Cardizem infusion ordered      ADDENDUM  Patient c/o s/e of dilaudid. , requesting morphine  On dilaudid 0.5mg q2h prn. Will switch to morphine 4mg prn q2h      5am  ADDENDUM  Afib 's despite being maxed out on Cardizem drip   Patient now clammy with BP 86/52    · Will give IV albumin  · Check blood glucose  · No IVF.  Noted with rales per cardiology given IV Lasix 7/22  · Reached out to cardiology, will start amiodarone infusion with bolus

## 2023-07-23 NOTE — PROGRESS NOTES
Progress Note - Cardiology   Tosha Dean 70 y.o. female MRN: [id number]  Unit/Bed#: E4 -01 Encounter: [id number]        Problem List:  Principal Problem:    Colonic ischemia ([address])  Active Problems:    Essential hypertension    Paroxysmal atrial fibrillation (HCC)    COPD (chronic obstructive pulmonary disease) (HCC)    Colitis    Chronic migraine w/o aura w/o status migrainosus, not intractable    IIH (idiopathic intracranial hypertension)    Hematochezia    Left carotid stenosis    Acute postoperative pain      Assessment:  Colonic ischemia, s/p left hemicolectomy with primary anastomosis 7/19/2020  Hematochezia  Colitis  L carotid artery stenosis  Idiopathic intracranial hypertension   Chronic migraine w/o aura  COPD  HTN  Lower extremity edema for which she takes furosemide  Paroxysmal Afib, now with RVR postoperatively              -On Eliquis, diltiazem, Toprol-XL prehospital    Plan/ Discussion:  • Remained in A-fib with RVR overnight heart rate up to 170 associated with some hemodynamic instability with systolic blood pressures in the 80s and 90s  • Amiodarone bolused and drip started with subsequent conversion to sinus rhythm  • BP improved now systolic in low 105'E and HR in 80's  • Continue IV amiodarone load  • Continue IV heparin and transition to oral anticoagulation prior to discharge  • Restart oral Lasix 20 daily  • Continue beta-blocker if blood pressure allows    Subjective:  Feeling better today compared to yesterday. Lasix improved swelling in hands and feet. Breathing is okay. No chest pain.     Vitals:  Vitals:    07/20/23 0531 07/21/23 0600   Weight: 86.2 kg (190 lb 0.6 oz) 87.7 kg (193 lb 5.5 oz)   ,  Vitals:    07/23/23 0438 07/23/23 0452 07/23/23 0604 07/23/23 0739   BP: (!) 89/49 (!) 86/52 98/65 112/61   BP Location:  Left arm  Left arm   Pulse: (!) 143  (!) 153 (!) 145   Resp:    20   Temp:    (!) 96.5 °F (35.8 °C)   TempSrc:    Temporal   SpO2:    90%   Weight:       Height: Exam:  General: Alert awake and oriented, no acute distress  Heart:  Regular rate and rhythm, no murmurs, Normal S1, no edema    Respiratory effort/ Lungs:  Breathing comfortably on 4 L nasal cannula, clear bilaterally without wheezing, rales, crackles   Abdominal: Non-tender to palpation, + bowel sounds, soft, no masses or distension  Lower Limbs:  No edema            Telemetry:       Normal sinus rhythm, , Heart Rate 70's and 80's    Medications:    Current Facility-Administered Medications:   •  acetaminophen (TYLENOL) tablet 650 mg, 650 mg, Oral, Q6H [address], Antonina Almanzar MD, 650 mg at 07/23/23 0047  •  aluminum-magnesium hydroxide-simethicone (MAALOX) oral suspension 30 mL, 30 mL, Oral, Q6H PRN, Antonina Almanzar MD  •  amiodarone (CORDARONE) 900 mg in dextrose 5 % 500 mL infusion, 1 mg/min, Intravenous, Continuous, Last Rate: 33.3 mL/hr at 07/23/23 0608, 1 mg/min at 07/23/23 0608 **FOLLOWED BY** amiodarone (CORDARONE) 900 mg in dextrose 5 % 500 mL infusion, 0.5 mg/min, Intravenous, Continuous, Ziyad Nett, CRNP  •  calcium gluconate 2 g in sodium chloride 0.9% 100 mL (premix), 2 g, Intravenous, Once, Jaylon Schwarz MD  •  cefTRIAXone (ROCEPHIN) IVPB (premix in dextrose) 1,000 mg 50 mL, 1,000 mg, Intravenous, Q24H, Antonina Almanzar MD, Last Rate: 100 mL/hr at 07/23/23 0600, 1,000 mg at 07/23/23 0600  •  chlorhexidine (PERIDEX) 0.12 % oral rinse 15 mL, 15 mL, Mouth/Throat, Q12H [address], Antonina Almanzar MD, 15 mL at 07/22/23 2027  •  diltiazem (CARDIZEM) injection 10 mg, 10 mg, Intravenous, Q3H PRN, Citlaly Garnett PA-C, 10 mg at 07/23/23 8878  •  diphenhydrAMINE (BENADRYL) injection 25 mg, 25 mg, Intravenous, HS PRN, Ziyad Nett, CRNP  •  Famotidine (PF) (PEPCID) injection 20 mg, 20 mg, Intravenous, HS, Antonina Almanzar MD, 20 mg at 07/22/23 0786  •  fluticasone-vilanterol 200-25 mcg/actuation 1 puff, 1 puff, Inhalation, Daily, Antonina Almanzar MD, 1 puff at 07/23/23 0802  • heparin (porcine) 25,000 units in 0.45% NaCl 250 mL infusion (premix), 3-20 Units/kg/hr (Order-Specific), Intravenous, Titrated, Janeth Carson MD, Last Rate: 16.2 mL/hr at 07/23/23 0544, 19 Units/kg/hr at 07/23/23 0544  •  heparin (porcine) injection 2,000 Units, 2,000 Units, Intravenous, Q6H PRN, Janeth Carson MD, 2,000 Units at 07/23/23 0545  •  heparin (porcine) injection 4,000 Units, 4,000 Units, Intravenous, Q6H PRN, Janeth Carson MD, 4,000 Units at 07/22/23 0238  •  LORazepam (ATIVAN) tablet 1 mg, 1 mg, Oral, TID PRN, LIBERTY SerraNP, 1 mg at 07/23/23 0217  •  methocarbamol (ROBAXIN) tablet 500 mg, 500 mg, Oral, Q6H [address], Janeth Carson MD, 500 mg at 07/23/23 6025  •  metoprolol (LOPRESSOR) injection 10 mg, 10 mg, Intravenous, Q6H PRN, Janeth Carson MD, 10 mg at 07/23/23 0329  •  metoprolol tartrate (LOPRESSOR) tablet 100 mg, 100 mg, Oral, Q12H [address], Janeth Carsno MD, 100 mg at 07/23/23 9966  •  morphine injection 4 mg, 4 mg, Intravenous, Q2H PRN, JUD Serra, 4 mg at 07/23/23 0045  •  naloxone (NARCAN) 0.04 mg/mL syringe 0.04 mg, 0.04 mg, Intravenous, Q1MIN PRN, Jesus Cantrell MD  •  ondansetron [facility]) injection 4 mg, 4 mg, Intravenous, Q6H PRN, Janeth Carson MD, 4 mg at 07/22/23 1820  •  oxyCODONE (ROXICODONE) immediate release tablet 10 mg, 10 mg, Oral, Q4H PRN, Jesus Cantrell MD, 10 mg at 07/23/23 8770  •  oxyCODONE (ROXICODONE) IR tablet 5 mg, 5 mg, Oral, Q4H PRN, Jesus Cantrell MD  •  polyethylene glycol (MIRALAX) packet 17 g, 17 g, Oral, Daily PRN, Janeth Carson MD  •  potassium chloride (K-DUR,KLOR-CON) CR tablet 40 mEq, 40 mEq, Oral, Once, Jesus Cantrell MD  •  potassium phosphates 21 mmol in sodium chloride 0.9 % 250 mL infusion, 21 mmol, Intravenous, Once, Jesus Cantrell MD, Last Rate: 62.5 mL/hr at 07/23/23 0827, 21 mmol at 07/23/23 0827  •  potassium phosphates 30 mmol in sodium chloride 0.9 % 250 mL infusion, 30 mmol, Intravenous, Once, Artie Tadeo MD  •  sucralfate (CARAFATE) tablet 1 g, 1 g, Oral, 4x Daily (AC & HS), Femi Delacruz MD, 1 g at 07/23/23 0551      Labs/Data:        Results from last 7 days   Lab Units 07/23/23  0449 07/22/23  1723 07/22/23  0819   WBC Thousand/uL 9.76 11.12* 11.70*   HEMOGLOBIN g/dL 8.8* 9.4* 9.4*   HEMATOCRIT % 27.6* 28.9* 29.0*   PLATELETS Thousands/uL 200 189 173     Results from last 7 days   Lab Units 07/23/23  0449 07/22/23  1723 07/22/23  0819   POTASSIUM mmol/L 3.5 2.9* 3.0*   CHLORIDE mmol/L 106 103 105   CO2 mmol/L 26 29 27   BUN mg/dL 12 9 10   CREATININE mg/dL 0.49* 0.47* 0.47*

## 2023-07-23 NOTE — PROGRESS NOTES
[facility]  Progress Note  Name: Kenya Adhikari  MRN: [id number]  Unit/Bed#: E4 -01 I Date of Admission: 7/18/2023   Date of Service: 7/23/2023 I Hospital Day: 4    Assessment/Plan   * Colonic ischemia [facility])  Assessment & Plan  · Status post laparotomy, colon resection, anastomosis on 7/19/2023  · Intraoperatively, she was found to have ischemia of the transverse, distal and sigmoid colon  · Postop management, diet and pain control per primary service    Paroxysmal atrial fibrillation ([address])  Assessment & Plan  • Due to persistent RVR he was placed on amiodarone by cardiology  • Ongoing heparin drip while off Eliquis  • Rate is better today  • Also looks more comfortable  • Cardiology follow-up      COPD (chronic obstructive pulmonary disease) ([address])  Assessment & Plan  Stable without exacerbation  Continue Breo 1 puff daily  Continue albuterol as needed    Essential hypertension  Assessment & Plan  · Continue metoprolol with hold parameters  · Pain control is  better today compared to yesterday    Left carotid stenosis  Assessment & Plan  · Medical management. · Resume aspirin when okay with primary service  Background:  · Pt was admitted 9/22 on the stroke pathway:  MRI negative and sx were attributed to migrane and discharged on ASA and eliquis  · She was diagnosed with left ICA stenosis 80% and underwent unsuccessful CEA with subsequent left facial numbness and left facial asymmetry  · Admitted 2/23 with left facial droop and BUE paresthesias with negative stroke work up that admission           VTE Pharmacologic Prophylaxis: VTE Score: 3 Moderate Risk (Score 3-4) - Pharmacological DVT Prophylaxis Ordered: heparin drip. Patient Centered Rounds: I performed bedside rounds with nursing staff today.   Discussions with Specialists or Other Care Team Provider: discussed with Dr Perry Piper of Stay: 4 day(s)  Current Patient Status: Inpatient   Certification Statement: The patient will continue to require additional inpatient hospital stay due to Postop  Discharge Plan: Will probably need rehab    Code Status: Level 1 - Full Code    Subjective:   Seen and examined during rounds  Her pain is better today on her current regimen, not the PCA  She also feels less swollen  She is passing gas but no bowel movement yet      Objective:     Vitals:   Temp (24hrs), Av.7 °F (35.9 °C), Min:96.2 °F (35.7 °C), Max:97.5 °F (36.4 °C)    Temp:  [96.2 °F (35.7 °C)-97.5 °F (36.4 °C)] 96.6 °F (35.9 °C)  HR:  [] 90  Resp:  [20-22] 20  BP: ()/(49-90) 130/60  SpO2:  [90 %-95 %] 94 %  Body mass index is 33.19 kg/m². Input and Output Summary (last 24 hours): Intake/Output Summary (Last 24 hours) at 2023 1512  Last data filed at 2023 3166  Gross per 24 hour   Intake 480 ml   Output 600 ml   Net -120 ml       Physical Exam:   Physical Exam  Vitals reviewed. Constitutional:       Appearance: She is ill-appearing. HENT:      Head: Normocephalic and atraumatic. Nose: No congestion or rhinorrhea. Eyes:      General: No scleral icterus. Cardiovascular:      Rate and Rhythm: Normal rate. Rhythm irregular. Pulmonary:      Breath sounds: No wheezing or rhonchi. Abdominal:      Palpations: Abdomen is soft. Comments: Hypoactive bowel   Musculoskeletal:      Cervical back: Neck supple. Right lower leg: No edema. Left lower leg: No edema. Skin:     General: Skin is warm and dry. Coloration: Skin is not jaundiced or pale. Neurological:      Mental Status: She is oriented to person, place, and time.    Psychiatric:         Behavior: Behavior normal.        Additional Data:     Labs:  Results from last 7 days   Lab Units 23  0449 23  0559 23  0416   WBC Thousand/uL 9.76   < > 15.73*   HEMOGLOBIN g/dL 8.8*   < > 12.3   HEMATOCRIT % 27.6*   < > 37.4   PLATELETS Thousands/uL 200   < > 177   BANDS PCT %  --   --  31*   NEUTROS PCT % 71 < >  --    LYMPHS PCT % 18   < >  --    LYMPHO PCT %  --   --  4*   MONOS PCT % 6   < >  --    MONO PCT %  --   --  0*   EOS PCT % 4   < > 0    < > = values in this interval not displayed. Results from last 7 days   Lab Units 07/23/23  0449 07/19/23  0728 07/18/23  2244   SODIUM mmol/L 139   < > 138   POTASSIUM mmol/L 3.5   < > 3.3*   CHLORIDE mmol/L 106   < > 105   CO2 mmol/L 26   < > 19*   BUN mg/dL 12   < > 18   CREATININE mg/dL 0.49*   < > 0.99   ANION GAP mmol/L 7   < > 14   CALCIUM mg/dL 7.5*   < > 9.5   ALBUMIN g/dL  --   --  4.7   TOTAL BILIRUBIN mg/dL  --   --  0.47   ALK PHOS U/L  --   --  118*   ALT U/L  --   --  14   AST U/L  --   --  17   GLUCOSE RANDOM mg/dL 95   < > 149*    < > = values in this interval not displayed. Results from last 7 days   Lab Units 07/21/23  0559   INR  1.49*     Results from last 7 days   Lab Units 07/23/23  0513   POC GLUCOSE mg/dl 77         Results from last 7 days   Lab Units 07/20/23  0318 07/19/23  2347 07/19/23 2011 07/19/23  1512 07/19/23  1054 07/19/23  0728   LACTIC ACID mmol/L 1.7 2.8* 1.4 3.0*   < > 4.0*   PROCALCITONIN ng/ml  --   --   --   --   --  0.42*    < > = values in this interval not displayed.        Lines/Drains:  Invasive Devices     Peripheral Intravenous Line  Duration           Peripheral IV 07/21/23 Proximal;Right;Ventral (anterior) Forearm 2 days    Peripheral IV 07/22/23 Left;Ventral (anterior) Forearm 1 day    Peripheral IV 07/23/23 Left;Proximal;Ventral (anterior) Forearm <1 day          Drain  Duration           External Urinary Catheter <1 day                  Telemetry:  Telemetry Orders (From admission, onward)             Telemetry Monitoring  Continuous x 24 Hours (Telem)        Question:  Reason for 24 Hour Telemetry  Answer:  Arrhythmias requiring acute medical intervention / PPM or ICD malfunction                 Telemetry Reviewed: A-fib            Imaging: Reviewed radiology reports from this admission including: abdominal/pelvic CT    Recent Cultures (last 7 days):         Last 24 Hours Medication List:   Current Facility-Administered Medications   Medication Dose Route Frequency Provider Last Rate   • acetaminophen  650 mg Oral Q6H [facility] Melvin Corado MD     • aluminum-magnesium hydroxide-simethicone  30 mL Oral Q6H PRN Melvin Corado MD     • amiodarone (CORDARONE) 900 mg in dextrose 5 % 500 mL infusion  0.5 mg/min Intravenous Continuous JUD Santos 0.5 mg/min (07/23/23 1159)   • calcium gluconate  2 g Intravenous Once Marty Yancey MD     • cefTRIAXone  1,000 mg Intravenous Q24H Melvin Corado MD 1,000 mg (07/23/23 0600)   • chlorhexidine  15 mL Mouth/Throat Q12H [facility] Melvin Corado MD     • diltiazem  10 mg Intravenous Q3H PRN Cristhian Garnett PA-C     • diphenhydrAMINE  25 mg Intravenous HS PRN JUD Santos     • famotidine  20 mg Oral HS Melvin Corado MD     • fluticasone-vilanterol  1 puff Inhalation Daily Melvin Corado MD     • furosemide  40 mg Oral Daily Cristhian Garnett PA-C     • heparin (porcine)  3-20 Units/kg/hr (Order-Specific) Intravenous Titrated Melvin Corado MD 19 Units/kg/hr (07/23/23 0544)   • heparin (porcine)  2,000 Units Intravenous Q6H PRN Melvin Corado MD     • heparin (porcine)  4,000 Units Intravenous Q6H PRN Melvin Corado MD     • LORazepam  1 mg Oral TID PRN JUD Santos     • methocarbamol  500 mg Oral Q6H [facility] Melvin Corado MD     • metoprolol  10 mg Intravenous Q6H PRN Melvin Corado MD     • metoprolol tartrate  100 mg Oral Q12H [facility] Melvin Corado MD     • morphine injection  4 mg Intravenous Q2H PRN JUD Santos     • naloxone  0.04 mg Intravenous Q1MIN PRN Marty Yancey MD     • ondansetron  4 mg Intravenous Q6H PRN Melvin Corado MD     • oxyCODONE  10 mg Oral Q4H PRN Marty Yancey MD     • oxyCODONE  5 mg Oral Q4H PRN Marty Yancey MD     • potassium chloride  40 mEq Oral Once Addie Cantor MD     • potassium phosphate  30 mmol Intravenous Once Addie Cantor MD     • sucralfate  1 g Oral 4x Daily [facility]) Yanet Schwarz MD          Today, Patient Was Seen By: Lloyd Mason MD    **Please Note: This note may have been constructed using a voice recognition system. **

## 2023-07-23 NOTE — ASSESSMENT & PLAN NOTE
• Due to persistent RVR he was placed on amiodarone by cardiology  • Ongoing heparin drip while off Eliquis  • Rate is better today  • Also looks more comfortable  • Cardiology follow-up

## 2023-07-24 ENCOUNTER — APPOINTMENT (INPATIENT)
Dept: CT IMAGING | Facility: HOSPITAL | Age: 63
DRG: 330 | End: 2023-07-24
Payer: COMMERCIAL

## 2023-07-24 ENCOUNTER — APPOINTMENT (INPATIENT)
Dept: RADIOLOGY | Facility: HOSPITAL | Age: 63
DRG: 330 | End: 2023-07-24
Payer: COMMERCIAL

## 2023-07-24 LAB
ANION GAP SERPL CALCULATED.3IONS-SCNC: 9 MMOL/L
APTT PPP: 165 SECONDS (ref 23–37)
APTT PPP: 32 SECONDS (ref 23–37)
BASOPHILS # BLD AUTO: 0.04 THOUSANDS/ÂΜL (ref 0–0.1)
BASOPHILS NFR BLD AUTO: 1 % (ref 0–1)
BUN SERPL-MCNC: 8 MG/DL (ref 5–25)
CALCIUM SERPL-MCNC: 8.1 MG/DL (ref 8.4–10.2)
CHLORIDE SERPL-SCNC: 101 MMOL/L (ref 96–108)
CO2 SERPL-SCNC: 28 MMOL/L (ref 21–32)
CREAT SERPL-MCNC: 0.53 MG/DL (ref 0.6–1.3)
EOSINOPHIL # BLD AUTO: 0.51 THOUSAND/ÂΜL (ref 0–0.61)
EOSINOPHIL NFR BLD AUTO: 6 % (ref 0–6)
ERYTHROCYTE [DISTWIDTH] IN BLOOD BY AUTOMATED COUNT: 13.2 % (ref 11.6–15.1)
GFR SERPL CREATININE-BSD FRML MDRD: 99 ML/MIN/1.73SQ M
GLUCOSE SERPL-MCNC: 95 MG/DL (ref 65–140)
HCT VFR BLD AUTO: 29 % (ref 34.8–46.1)
HGB BLD-MCNC: 9.3 G/DL (ref 11.5–15.4)
IMM GRANULOCYTES # BLD AUTO: 0.15 THOUSAND/UL (ref 0–0.2)
IMM GRANULOCYTES NFR BLD AUTO: 2 % (ref 0–2)
LYMPHOCYTES # BLD AUTO: 1.21 THOUSANDS/ÂΜL (ref 0.6–4.47)
LYMPHOCYTES NFR BLD AUTO: 14 % (ref 14–44)
MCH RBC QN AUTO: 29.4 PG (ref 26.8–34.3)
MCHC RBC AUTO-ENTMCNC: 32.1 G/DL (ref 31.4–37.4)
MCV RBC AUTO: 92 FL (ref 82–98)
MONOCYTES # BLD AUTO: 0.67 THOUSAND/ÂΜL (ref 0.17–1.22)
MONOCYTES NFR BLD AUTO: 8 % (ref 4–12)
NEUTROPHILS # BLD AUTO: 6.21 THOUSANDS/ÂΜL (ref 1.85–7.62)
NEUTS SEG NFR BLD AUTO: 69 % (ref 43–75)
NRBC BLD AUTO-RTO: 0 /100 WBCS
PHOSPHATE SERPL-MCNC: 2.5 MG/DL (ref 2.3–4.1)
PLATELET # BLD AUTO: 226 THOUSANDS/UL (ref 149–390)
PMV BLD AUTO: 9.8 FL (ref 8.9–12.7)
POTASSIUM SERPL-SCNC: 3 MMOL/L (ref 3.5–5.3)
RBC # BLD AUTO: 3.16 MILLION/UL (ref 3.81–5.12)
SODIUM SERPL-SCNC: 138 MMOL/L (ref 135–147)
WBC # BLD AUTO: 8.79 THOUSAND/UL (ref 4.31–10.16)

## 2023-07-24 PROCEDURE — G1004 CDSM NDSC: HCPCS

## 2023-07-24 PROCEDURE — 99233 SBSQ HOSP IP/OBS HIGH 50: CPT | Performed by: INTERNAL MEDICINE

## 2023-07-24 PROCEDURE — 85025 COMPLETE CBC W/AUTO DIFF WBC: CPT | Performed by: INTERNAL MEDICINE

## 2023-07-24 PROCEDURE — 97110 THERAPEUTIC EXERCISES: CPT

## 2023-07-24 PROCEDURE — 97535 SELF CARE MNGMENT TRAINING: CPT

## 2023-07-24 PROCEDURE — 80048 BASIC METABOLIC PNL TOTAL CA: CPT | Performed by: INTERNAL MEDICINE

## 2023-07-24 PROCEDURE — 99231 SBSQ HOSP IP/OBS SF/LOW 25: CPT | Performed by: INTERNAL MEDICINE

## 2023-07-24 PROCEDURE — 02HV33Z INSERTION OF INFUSION DEVICE INTO SUPERIOR VENA CAVA, PERCUTANEOUS APPROACH: ICD-10-PCS | Performed by: RADIOLOGY

## 2023-07-24 PROCEDURE — 74177 CT ABD & PELVIS W/CONTRAST: CPT

## 2023-07-24 PROCEDURE — 97530 THERAPEUTIC ACTIVITIES: CPT

## 2023-07-24 PROCEDURE — 85730 THROMBOPLASTIN TIME PARTIAL: CPT | Performed by: SURGERY

## 2023-07-24 PROCEDURE — 99024 POSTOP FOLLOW-UP VISIT: CPT | Performed by: SURGERY

## 2023-07-24 PROCEDURE — 85730 THROMBOPLASTIN TIME PARTIAL: CPT | Performed by: INTERNAL MEDICINE

## 2023-07-24 PROCEDURE — 71045 X-RAY EXAM CHEST 1 VIEW: CPT

## 2023-07-24 PROCEDURE — 84100 ASSAY OF PHOSPHORUS: CPT | Performed by: INTERNAL MEDICINE

## 2023-07-24 RX ORDER — AMIODARONE HYDROCHLORIDE 200 MG/1
200 TABLET ORAL 2 TIMES DAILY WITH MEALS
Status: DISCONTINUED | OUTPATIENT
Start: 2023-07-24 | End: 2023-07-25

## 2023-07-24 RX ORDER — POTASSIUM CHLORIDE 20 MEQ/1
40 TABLET, EXTENDED RELEASE ORAL ONCE
Status: COMPLETED | OUTPATIENT
Start: 2023-07-24 | End: 2023-07-24

## 2023-07-24 RX ADMIN — DILTIAZEM HYDROCHLORIDE 10 MG: 5 INJECTION INTRAVENOUS at 11:09

## 2023-07-24 RX ADMIN — CHLORHEXIDINE GLUCONATE 15 ML: 1.2 RINSE ORAL at 21:35

## 2023-07-24 RX ADMIN — ACETAMINOPHEN 325MG 650 MG: 325 TABLET ORAL at 00:25

## 2023-07-24 RX ADMIN — ACETAMINOPHEN 325MG 650 MG: 325 TABLET ORAL at 05:48

## 2023-07-24 RX ADMIN — ACETAMINOPHEN 325MG 650 MG: 325 TABLET ORAL at 23:25

## 2023-07-24 RX ADMIN — OXYCODONE HYDROCHLORIDE 5 MG: 5 TABLET ORAL at 09:18

## 2023-07-24 RX ADMIN — LORAZEPAM 1 MG: 1 TABLET ORAL at 04:04

## 2023-07-24 RX ADMIN — CHLORHEXIDINE GLUCONATE 15 ML: 1.2 RINSE ORAL at 08:52

## 2023-07-24 RX ADMIN — ONDANSETRON 4 MG: 2 INJECTION INTRAMUSCULAR; INTRAVENOUS at 23:26

## 2023-07-24 RX ADMIN — FAMOTIDINE 20 MG: 20 TABLET ORAL at 21:35

## 2023-07-24 RX ADMIN — DILTIAZEM HYDROCHLORIDE 30 MG: 30 TABLET, FILM COATED ORAL at 23:26

## 2023-07-24 RX ADMIN — METOPROLOL TARTRATE 100 MG: 100 TABLET, FILM COATED ORAL at 21:35

## 2023-07-24 RX ADMIN — METHOCARBAMOL 500 MG: 500 TABLET ORAL at 00:25

## 2023-07-24 RX ADMIN — ACETAMINOPHEN 325MG 650 MG: 325 TABLET ORAL at 11:13

## 2023-07-24 RX ADMIN — ONDANSETRON 4 MG: 2 INJECTION INTRAMUSCULAR; INTRAVENOUS at 16:32

## 2023-07-24 RX ADMIN — HEPARIN SODIUM 20 UNITS/KG/HR: 10000 INJECTION, SOLUTION INTRAVENOUS at 23:31

## 2023-07-24 RX ADMIN — ACETAMINOPHEN 325MG 650 MG: 325 TABLET ORAL at 17:33

## 2023-07-24 RX ADMIN — DILTIAZEM HYDROCHLORIDE 10 MG: 5 INJECTION INTRAVENOUS at 19:44

## 2023-07-24 RX ADMIN — AMIODARONE HYDROCHLORIDE 200 MG: 200 TABLET ORAL at 16:32

## 2023-07-24 RX ADMIN — SUCRALFATE 1 G: 1 TABLET ORAL at 21:35

## 2023-07-24 RX ADMIN — SUCRALFATE 1 G: 1 TABLET ORAL at 16:32

## 2023-07-24 RX ADMIN — OXYCODONE HYDROCHLORIDE 5 MG: 5 TABLET ORAL at 14:39

## 2023-07-24 RX ADMIN — POTASSIUM CHLORIDE 40 MEQ: 1500 TABLET, EXTENDED RELEASE ORAL at 08:50

## 2023-07-24 RX ADMIN — FUROSEMIDE 40 MG: 40 TABLET ORAL at 08:50

## 2023-07-24 RX ADMIN — DILTIAZEM HYDROCHLORIDE 30 MG: 30 TABLET, FILM COATED ORAL at 16:32

## 2023-07-24 RX ADMIN — OXYCODONE HYDROCHLORIDE 5 MG: 5 TABLET ORAL at 00:24

## 2023-07-24 RX ADMIN — ONDANSETRON 4 MG: 2 INJECTION INTRAMUSCULAR; INTRAVENOUS at 06:05

## 2023-07-24 RX ADMIN — MORPHINE SULFATE 4 MG: 4 INJECTION INTRAVENOUS at 20:47

## 2023-07-24 RX ADMIN — METHOCARBAMOL 500 MG: 500 TABLET ORAL at 23:25

## 2023-07-24 RX ADMIN — OXYCODONE HYDROCHLORIDE 10 MG: 10 TABLET ORAL at 19:50

## 2023-07-24 RX ADMIN — CEFTRIAXONE 1000 MG: 1 INJECTION, SOLUTION INTRAVENOUS at 06:13

## 2023-07-24 RX ADMIN — METHOCARBAMOL 500 MG: 500 TABLET ORAL at 11:13

## 2023-07-24 RX ADMIN — SUCRALFATE 1 G: 1 TABLET ORAL at 06:05

## 2023-07-24 RX ADMIN — METOPROLOL TARTRATE 100 MG: 100 TABLET, FILM COATED ORAL at 08:50

## 2023-07-24 RX ADMIN — FLUTICASONE FUROATE AND VILANTEROL TRIFENATATE 1 PUFF: 200; 25 POWDER RESPIRATORY (INHALATION) at 08:57

## 2023-07-24 RX ADMIN — METHOCARBAMOL 500 MG: 500 TABLET ORAL at 05:48

## 2023-07-24 RX ADMIN — METHOCARBAMOL 500 MG: 500 TABLET ORAL at 17:33

## 2023-07-24 RX ADMIN — SUCRALFATE 1 G: 1 TABLET ORAL at 11:13

## 2023-07-24 RX ADMIN — IOHEXOL 100 ML: 350 INJECTION, SOLUTION INTRAVENOUS at 18:51

## 2023-07-24 RX ADMIN — MORPHINE SULFATE 4 MG: 4 INJECTION INTRAVENOUS at 04:04

## 2023-07-24 RX ADMIN — POTASSIUM CHLORIDE 40 MEQ: 1500 TABLET, EXTENDED RELEASE ORAL at 09:21

## 2023-07-24 RX ADMIN — HEPARIN SODIUM 23 UNITS/KG/HR: 10000 INJECTION, SOLUTION INTRAVENOUS at 08:52

## 2023-07-24 RX ADMIN — HEPARIN SODIUM 4000 UNITS: 1000 INJECTION INTRAVENOUS; SUBCUTANEOUS at 03:45

## 2023-07-24 NOTE — PROGRESS NOTES
Progress Note - Cardiology   Nneka Shipman 70 y.o. 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 Signs of colitis matching RAMBO distribution    Hx Paroxysmal Atrial Fib/Flutter: Now with intermittent recurrence at times w/ RVR   Dx 3/2020   Anticoagulation: Eliquis   AV blocking Rx PTA: Toprol 100mg bid + cardizem 120mg daily   Initiated on amiodarone 7/23/23  Essential HTN   Mgmt historically somewhat challenging d/t Rx intolerance: HCTZ -"throat closing", valsartan -reported angioedema, HCTZ -"kidney pain, nifedipine -headache & nausea, verapamil -palpitation   Prehospital Rx: Cardizem and Toprol as above, Lasix 40 mg daily, Aldactone 25 mg twice daily  Mild-Mod Aortic valve regurgitaiton (echo 2/1/2023)   LVEF 60%, mild LVH, normal wall motion, grade 1 DD  Most recent stress 1/17/2023: No perfusion defects, EF 71%  Hypercholesterolemia -probable heterozygous familial hypercholesterolemia   Rx -Repatha  Other    * CAMILLE: CPAP compliant    * COPD    * Hx TIA    * Lt carotid stenosis (asymptomatic), Aborted (d/t anatomy) CEA 1/31/23    * Chronic migrane    * Idiopathic intracranial HTN    · Still with frequent short-lived episodes of atrial fibrillation/flutter --moderate subjective recognition of heart rate and rhythm and overall well-tolerated  · continue amiodarone transitioning from IV to oral therapy today.   Continue 200 mg twice daily through 8/2; 200mg daily thereafter   · Continue current dose of Lopressor-100 mg twice daily  · On heparin for stroke risk reduction related to AF ~~> transition back to Eliquis when acceptable by surgery  · Exam suggests euvolemia ~~> continue current dose of Lasix following for signs/symptoms of heart failure and wean from oxygen        Subjective:  Patient reports fatigue but overall comfortable denying any dyspnea. She only has infrequent recognition of her heart rate and rhythm denying any lightheadedness, dyspnea, chest pain. She has ambulated to the bathroom and is spent modest amount of time out of bed tolerating same. Flatus without BM. Limited appetite consuming only small amounts of water ice this a.m. Vitals:  Vitals:    07/20/23 0531 07/21/23 0600   Weight: 86.2 kg (190 lb 0.6 oz) 87.7 kg (193 lb 5.5 oz)   ,  Vitals:    07/23/23 2233 07/24/23 0353 07/24/23 0746 07/24/23 1115   BP: 156/73 (!) 155/104 99/74 111/56   BP Location: Left arm Left arm Left arm Left arm   Pulse: 86 104 90 71   Resp: 22 16 18 18   Temp: (!) 97.4 °F (36.3 °C) 97.6 °F (36.4 °C) (!) 96.5 °F (35.8 °C) (!) 95.5 °F (35.3 °C)   TempSrc: Temporal Temporal Temporal Temporal   SpO2: 91%  97% 98%   Weight:       Height:           Exam:  General: Alert and normally conversant.   She appears tired but overall comfortable  Heart:  Regular with controlled rate and no pathologic murmur or rub  Lungs:  Clear throughout  Lower Limbs:  No edema           Telemetry:       Frequent intermittent recurrent atrial flutter at times with accelerated ventricular rate      Medications:    Current Facility-Administered Medications:   •  acetaminophen (TYLENOL) tablet 650 mg, 650 mg, Oral, Q6H [address], Trey Garcia MD, 650 mg at 07/24/23 1113  •  aluminum-magnesium hydroxide-simethicone (MAALOX) oral suspension 30 mL, 30 mL, Oral, Q6H PRN, Trey Garcia MD  •  amiodarone (CORDARONE) 900 mg in dextrose 5 % 500 mL infusion, 0.5 mg/min, Intravenous, Continuous, Ji Aceves MD, Last Rate: 16.7 mL/hr at 07/23/23 1932, 0.5 mg/min at 07/23/23 1932  •  calcium gluconate 2 g in sodium chloride 0.9% 100 mL (premix), 2 g, Intravenous, Once, Chi Montoya MD  •  cefTRIAXone (ROCEPHIN) IVPB (premix in dextrose) 1,000 mg 50 mL, 1,000 mg, Intravenous, Q24H, Tanya San Mily Raza MD, Stopped at 07/24/23 0645  •  chlorhexidine (PERIDEX) 0.12 % oral rinse 15 mL, 15 mL, Mouth/Throat, Q12H [facility], Trey Garcia MD, 15 mL at 07/24/23 1264  •  diltiazem (CARDIZEM) injection 10 mg, 10 mg, Intravenous, Q3H PRN, Dm Garnett PA-C, 10 mg at 07/24/23 1109  •  diphenhydrAMINE (BENADRYL) injection 25 mg, 25 mg, Intravenous, HS PRN, Jessica Maw, CRNP  •  famotidine (PEPCID) tablet 20 mg, 20 mg, Oral, HS, Trey Garcia MD, 20 mg at 07/23/23 2200  •  fluticasone-vilanterol 200-25 mcg/actuation 1 puff, 1 puff, Inhalation, Daily, Trey Garcia MD, 1 puff at 07/24/23 0857  •  furosemide (LASIX) tablet 40 mg, 40 mg, Oral, Daily, Dm Garnett PA-C, 40 mg at 07/24/23 6016  •  heparin (porcine) 25,000 units in 0.45% NaCl 250 mL infusion (premix), 3-20 Units/kg/hr (Order-Specific), Intravenous, Titrated, Trey Garcia MD, Last Rate: 19.6 mL/hr at 07/24/23 0852, 23 Units/kg/hr at 07/24/23 0852  •  heparin (porcine) injection 2,000 Units, 2,000 Units, Intravenous, Q6H PRN, Trey Garcia MD, 2,000 Units at 07/23/23 0545  •  heparin (porcine) injection 4,000 Units, 4,000 Units, Intravenous, Q6H PRN, Trey Garcia MD, 4,000 Units at 07/24/23 0345  •  LORazepam (ATIVAN) tablet 1 mg, 1 mg, Oral, TID PRN, Jessica Maw, CRNP, 1 mg at 07/24/23 0404  •  methocarbamol (ROBAXIN) tablet 500 mg, 500 mg, Oral, Q6H [facility], Trey Garcia MD, 500 mg at 07/24/23 1113  •  metoprolol (LOPRESSOR) injection 10 mg, 10 mg, Intravenous, Q6H PRN, Trey Garcia MD, 10 mg at 07/23/23 0329  •  metoprolol tartrate (LOPRESSOR) tablet 100 mg, 100 mg, Oral, Q12H [facility], Trey Garcia MD, 100 mg at 07/24/23 0850  •  morphine injection 4 mg, 4 mg, Intravenous, Q2H PRN, JUD Persaud, 4 mg at 07/24/23 0404  •  naloxone (NARCAN) 0.04 mg/mL syringe 0.04 mg, 0.04 mg, Intravenous, Q1MIN PRN, Chi Montoya MD  •  ondansetron [facility]) injection 4 mg, 4 mg, Intravenous, Q6H PRN, Tara Combs MD, 4 mg at 07/24/23 5631  •  oxyCODONE (ROXICODONE) immediate release tablet 10 mg, 10 mg, Oral, Q4H PRN, Ksenia Weber MD, 10 mg at 07/23/23 1630  •  oxyCODONE (ROXICODONE) IR tablet 5 mg, 5 mg, Oral, Q4H PRN, Ksenia Weber MD, 5 mg at 07/24/23 1389  •  potassium chloride (K-DUR,KLOR-CON) CR tablet 40 mEq, 40 mEq, Oral, Once, Ksenia Weber MD  •  potassium phosphates 30 mmol in sodium chloride 0.9 % 250 mL infusion, 30 mmol, Intravenous, Once, Ksenia Weber MD  •  sucralfate (CARAFATE) tablet 1 g, 1 g, Oral, 4x Daily (AC & HS), Tara Combs MD, 1 g at 07/24/23 1113      Labs/Data:        Results from last 7 days   Lab Units 07/24/23  0212 07/23/23  0449 07/22/23  1723   WBC Thousand/uL 8.79 9.76 11.12*   HEMOGLOBIN g/dL 9.3* 8.8* 9.4*   HEMATOCRIT % 29.0* 27.6* 28.9*   PLATELETS Thousands/uL 226 200 189     Results from last 7 days   Lab Units 07/24/23  0212 07/23/23  0449 07/22/23  1723   POTASSIUM mmol/L 3.0* 3.5 2.9*   CHLORIDE mmol/L 101 106 103   CO2 mmol/L 28 26 29   BUN mg/dL 8 12 9   CREATININE mg/dL 0.53* 0.49* 0.47*

## 2023-07-24 NOTE — OCCUPATIONAL THERAPY NOTE
Occupational Therapy Progress Note     Patient Name: Sangeetha Farnsworth  Today's Date: 7/24/2023  Problem List  Principal Problem:    Colonic ischemia [facility])  Active Problems:    Essential hypertension    Paroxysmal atrial fibrillation (HCC)    COPD (chronic obstructive pulmonary disease) (HCC)    Colitis    Chronic migraine w/o aura w/o status migrainosus, not intractable    IIH (idiopathic intracranial hypertension)    Hematochezia    Left carotid stenosis    Acute postoperative pain            07/24/23 1101   OT Last Visit   OT Visit Date 07/24/23   Note Type   Note Type Treatment   Pain Assessment   Pain Assessment Tool 0-10   Pain Score 7   Pain Location/Orientation Orientation: Bilateral;Location: Abdomen   Hospital Pain Intervention(s) Declines;Repositioned; Emotional support   Restrictions/Precautions   Weight Bearing Precautions Per Order No   Other Precautions Chair Alarm; Bed Alarm;O2;Fall Risk;Telemetry;Pain;Multiple lines  (6L O2 via NC)   Lifestyle   Intrinsic Gratification reading   ADL   Where Assessed Chair   Grooming Assistance 4  Minimal Assistance   Grooming Deficit Steadying;Setup;Verbal cueing;Supervision/safety; Increased time to complete;Wash/dry hands; Wash/dry face; Teeth care;Brushing hair   Grooming Comments steadying support at sink, assist w/ managing knot in hair   LB Dressing Assistance 4  Minimal Assistance   LB Dressing Deficit Setup;Verbal cueing;Supervision/safety; Increased time to complete; Don/doff R sock; Don/doff L sock   LB Dressing Comments educated and demonstrated crossed leg techniques for ADLs   Toileting Assistance  4  Minimal Assistance   Toileting Deficit Steadying;Setup;Verbal cueing;Supervison/safety; Increased time to complete;Clothing management up;Clothing management down;Perineal hygiene;Grab bar use   Toileting Comments steadying support for ADLs   Bed Mobility   Additional Comments seated in recliner pre/post session w/ alarm intact   Transfers   Sit to Stand 4  Minimal assistance   Additional items Assist x 1; Increased time required;Verbal cues;Armrests   Stand to Sit 4  Minimal assistance   Additional items Assist x 1; Increased time required;Verbal cues;Armrests   Toilet transfer 4  Minimal assistance   Additional items Assist x 1; Increased time required;Verbal cues; Commode  (BSC over toilet)   Additional Comments VCs for hand placement and positioning   Functional Mobility   Functional Mobility 4  Minimal assistance   Additional Comments assist x1 w/ increased time and assist O2 and 2 IV Pole management   Additional items Rolling walker   Therapeutic Exercise - ROM   UE-ROM Yes   ROM- Right Upper Extremities   R Shoulder AROM; Flexion; Extension;Horizontal ABduction  (triceps)   R Elbow AROM;Elbow flexion;Elbow extension  (punchouts, forearm pronation/supination)   R Position Seated   R Weight/Reps/Sets 3 sets x 15 reps   RUE ROM Comment tolerated well w/ cues for correct positioning and requiring rest breaks and cues for proper breathing t/o   ROM - Left Upper Extremities    L Shoulder AROM; Flexion; Extension;Horizontal ABduction  (triceps)   L Elbow AROM;Elbow flexion;Elbow extension  (punchouts, forearm pronation/supination)   L Position Seated   L Weight/Reps/Sets 3 sets x 15reps   LUE ROM Comment tolerated well w/ cues for correct positioning and requiring rest breaks and cues for proper breathing t/o   Subjective   Subjective "I am doing better"   Cognition   Overall Cognitive Status WFL   Arousal/Participation Cooperative;Responsive; Alert   Attention Attends with cues to redirect   Orientation Level Oriented X4   Memory Within functional limits   Following Commands Follows one step commands without difficulty   Comments pleasant and cooperative, fatigue at times, motivated to get better   Additional Activities   Additional Activities   (education on EC techniques, pursed lip breathing)   Additional Activities Comments handouts provided   Activity Tolerance   Activity Tolerance Patient limited by pain; Patient limited by fatigue;Treatment limited secondary to medical complications (Comment)   Medical Staff Made Aware appropriate to see per Shantelle FAUSTIN   Assessment   Assessment Pt seen for skilled OT session focused on ADLs, functional transfers and mobility, UE exercises, EC education. Pt seated in recliner upon arrival. Pt w/ MIN assist sit>stand from chair w/ cues for positioning. Pt w/ MIN assist x1 functional mobility w/ RW to bathroom and transfer w/ BSC over toilet and grab bar use and min assist steadying during toileting hygiene. Pt w/ MIN assist steadying grooming at sink to brush teeth and wash face. Pt w/ MIN assist functional mobility w/ RW from bathroom to recliner. Pt w/ MIN assist stand>sit transfer to chair w/ increased time to complete. Pt w/ SPO2 on 6L O2 97-99% w/ activity however requiring rest breaks and cues for pursed lip breathing (provided w/ handout). Pt w/ MIN assist LB ADLs and educated on compensatory techniques crossed Leg for LB ADLs. Pt completed b/l UE exercises seen above w/ cues for correct techniques and needed rest breaks t/o session. Pt seated upright in chair end of session w/ all needs met. Pt w/ improvement in endurance and ADL performance this session. Pt continues to be limited due to decreased strength and endurance, impaired balance, impaired activity tolerance, increased pain, multiple lines, increased fatigue all causing a decline in ADLs, functional transfers and mobility. Recommend STR when medically stable. Will continue to follow. The patient's raw score on the AM-PAC Daily Activity Inpatient Short Form is 17. A raw score of less than 19 suggests the patient may benefit from discharge to post-acute rehabilitation services. Please refer to the recommendation of the Occupational Therapist for safe discharge planning. Plan   Treatment Interventions ADL retraining;UE strengthening/ROM; Functional transfer training; Endurance training;Cognitive reorientation;Patient/family training;Equipment evaluation/education;UE splinting; Activityengagement; Energy conservation   Goal Expiration Date 08/04/23   OT Treatment Day 1   OT Frequency 3-5x/wk   Recommendation   OT Discharge Recommendation Post acute rehabilitation services   AM-PAC Daily Activity Inpatient   Lower Body Dressing 2   Bathing 3   Toileting 2   Upper Body Dressing 3   Grooming 3   Eating 4   Daily Activity Raw Score 17   Daily Activity Standardized Score (Calc for Raw Score >=11) 37.26   AM-PAC Applied Cognition Inpatient   Following a Speech/Presentation 4   Understanding Ordinary Conversation 4   Taking Medications 4   Remembering Where Things Are Placed or Put Away 4   Remembering List of 4-5 Errands 4   Taking Care of Complicated Tasks 4   Applied Cognition Raw Score 24   Applied Cognition Standardized Score 62.21   Modified [place] Scale   Modified Jyoti Scale 4   End of Consult   Education Provided Yes   Patient Position at End of Consult All needs within reach;Bed/Chair alarm activated; Bedside chair   Nurse Communication Nurse aware of consult     Documentation completed by: Christian Clement MS, OTR/L

## 2023-07-24 NOTE — PLAN OF CARE
Problem: PAIN - ADULT  Goal: Verbalizes/displays adequate comfort level or baseline comfort level  Description: Interventions:  - Encourage patient to monitor pain and request assistance  - Assess pain using appropriate pain scale  - Administer analgesics based on type and severity of pain and evaluate response  - Implement non-pharmacological measures as appropriate and evaluate response  - Consider cultural and social influences on pain and pain management  - Notify physician/advanced practitioner if interventions unsuccessful or patient reports new pain  Outcome: Progressing     Problem: INFECTION - ADULT  Goal: Absence or prevention of progression during hospitalization  Description: INTERVENTIONS:  - Assess and monitor for signs and symptoms of infection  - Monitor lab/diagnostic results  - Monitor all insertion sites, i.e. indwelling lines, tubes, and drains  - Monitor endotracheal if appropriate and nasal secretions for changes in amount and color  - [place] appropriate cooling/warming therapies per order  - Administer medications as ordered  - Instruct and encourage patient and family to use good hand hygiene technique  - Identify and instruct in appropriate isolation precautions for identified infection/condition  Outcome: Progressing  Goal: Absence of fever/infection during neutropenic period  Description: INTERVENTIONS:  - Monitor WBC    Outcome: Progressing     Problem: SAFETY ADULT  Goal: Patient will remain free of falls  Description: INTERVENTIONS:  - Educate patient/family on patient safety including physical limitations  - Instruct patient to call for assistance with activity   - Consult OT/PT to assist with strengthening/mobility   - Keep Call bell within reach  - Keep bed low and locked with side rails adjusted as appropriate  - Keep care items and personal belongings within reach  - Initiate and maintain comfort rounds  - Make Fall Risk Sign visible to staff  - Offer Toileting every 2 Hours, in advance of need  - Initiate/Maintain bed alarm  - Obtain necessary fall risk management equipment: call bell  - Apply yellow socks and bracelet for high fall risk patients  - Consider moving patient to room near nurses station  Outcome: Progressing  Goal: Maintain or return to baseline ADL function  Description: INTERVENTIONS:  -  Assess patient's ability to carry out ADLs; assess patient's baseline for ADL function and identify physical deficits which impact ability to perform ADLs (bathing, care of mouth/teeth, toileting, grooming, dressing, etc.)  - Assess/evaluate cause of self-care deficits   - Assess range of motion  - Assess patient's mobility; develop plan if impaired  - Assess patient's need for assistive devices and provide as appropriate  - Encourage maximum independence but intervene and supervise when necessary  - Involve family in performance of ADLs  - Assess for home care needs following discharge   - Consider OT consult to assist with ADL evaluation and planning for discharge  - Provide patient education as appropriate  Outcome: Progressing  Goal: Maintains/Returns to pre admission functional level  Description: INTERVENTIONS:  - Perform BMAT or MOVE assessment daily.   - Set and communicate daily mobility goal to care team and patient/family/caregiver. - Collaborate with rehabilitation services on mobility goals if consulted  - Perform Range of Motion 3 times a day. - Reposition patient every 2 hours.   - Dangle patient 3 times a day  - Stand patient 3 times a day  - Ambulate patient 3 times a day  - Out of bed to chair 3 times a day   - Out of bed for meals 3  Problem: DISCHARGE PLANNING  Goal: Discharge to home or other facility with appropriate resources  Description: INTERVENTIONS:  - Identify barriers to discharge w/patient and caregiver  - Arrange for needed discharge resources and transportation as appropriate  - Identify discharge learning needs (meds, wound care, etc.)  - Arrange for interpretive services to assist at discharge as needed  - Refer to Case Management Department for coordinating discharge planning if the patient needs post-hospital services based on physician/advanced practitioner order or complex needs related to functional status, cognitive ability, or social support system  Outcome: Progressing     Problem: Knowledge Deficit  Goal: Patient/family/caregiver demonstrates understanding of disease process, treatment plan, medications, and discharge instructions  Description: Complete learning assessment and assess knowledge base.   Interventions:  - Provide teaching at level of understanding  - Provide teaching via preferred learning methods  Outcome: Progressing     Problem: Prexisting or High Potential for Compromised Skin Integrity  Goal: Skin integrity is maintained or improved  Description: INTERVENTIONS:  - Identify patients at risk for skin breakdown  - Assess and monitor skin integrity  - Assess and monitor nutrition and hydration status  - Monitor labs   - Assess for incontinence   - Turn and reposition patient  - Assist with mobility/ambulation  - Relieve pressure over bony prominences  - Avoid friction and shearing  - Provide appropriate hygiene as needed including keeping skin clean and dry  - Evaluate need for skin moisturizer/barrier cream  - Collaborate with interdisciplinary team   - Patient/family teaching  - Consider wound care consult   Outcome: Progressing     Problem: MOBILITY - ADULT  Goal: Maintain or return to baseline ADL function  Description: INTERVENTIONS:  -  Assess patient's ability to carry out ADLs; assess patient's baseline for ADL function and identify physical deficits which impact ability to perform ADLs (bathing, care of mouth/teeth, toileting, grooming, dressing, etc.)  - Assess/evaluate cause of self-care deficits   - Assess range of motion  - Assess patient's mobility; develop plan if impaired  - Assess patient's need for assistive devices and provide as appropriate  - Encourage maximum independence but intervene and supervise when necessary  - Involve family in performance of ADLs  - Assess for home care needs following discharge   - Consider OT consult to assist with ADL evaluation and planning for discharge  - Provide patient education as appropriate  Outcome: Progressing  Goal: Maintains/Returns to pre admission functional level  Description: INTERVENTIONS:  - Perform BMAT or MOVE assessment daily.   - Set and communicate daily mobility goal to care team and patient/family/caregiver. - Collaborate with rehabilitation services on mobility goals if consulted  - Perform Range of Motion 3 times a day. - Reposition patient every 2 hours. - Dangle patient 3 times a day  - Stand patient 3 times a day  - Ambulate patient 3 times a day  - Out of bed to chair 3 times a day   - Out of bed for meals 3  Problem: Nutrition/Hydration-ADULT  Goal: Nutrient/Hydration intake appropriate for improving, restoring or maintaining nutritional needs  Description: Monitor and assess patient's nutrition/hydration status for malnutrition. Collaborate with interdisciplinary team and initiate plan and interventions as ordered. Monitor patient's weight and dietary intake as ordered or per policy. Utilize nutrition screening tool and intervene as necessary. Determine patient's food preferences and provide high-protein, high-caloric foods as appropriate.      INTERVENTIONS:  - Monitor oral intake, urinary output, labs, and treatment plans  - Assess nutrition and hydration status and recommend course of action  - Evaluate amount of meals eaten  - Assist patient with eating if necessary   - Allow adequate time for meals  - Recommend/ encourage appropriate diets, oral nutritional supplements, and vitamin/mineral supplements  - Order, calculate, and assess calorie counts as needed  - Recommend, monitor, and adjust tube feedings and TPN/PPN based on assessed needs  - Assess need for intravenous fluids  - Provide specific nutrition/hydration education as appropriate  - Include patient/family/caregiver in decisions related to nutrition  Outcome: Progressing    times a day  - Out of bed for toileting  - Record patient progress and toleration of activity level   Outcome: Progressing    times a day  - Out of bed for toileting  - Record patient progress and toleration of activity level   Outcome: Progressing

## 2023-07-24 NOTE — PROGRESS NOTES
Progress Note - General Surgery   Frederick Duval 70 y.o. female MRN: [id number]  Unit/Bed#: E4 -01 Encounter: [id number]    Assessment:  70 F w/ pmh of HTN, HLD, afib, CAD, COPD, SMA thrombosis s/p stent presents with abdominal pain, hematochezia and c/f ischemic colitis. Now s/p diagnostic lap converted to ex lap with extended left hemicolectomy and primary anastomosis 7/19    Heart rate in the 150s this morning  Afebrile  Normotensive        Plan:  • Clears   • Continue heparin drip  • Cardiology recs appreciated  ? Drips for atrial fibrillation  • SLIM recs appreciated  • We will plan to rescan this morning given persistent atrial fibrillation and increasing abdominal pain  • Monitor electrolytes  • Continue abx  • IS  • OOBTID / PT/OT  • Continue telemetry  • Please TigerText on call [facility] surgery resident or AP with any questions       Subjective/Objective     Subjective:   No acute events overnight. Some increased pain overnight. Some nausea no vomiting. Passing gas no bowel movements. Objective:     Blood pressure 149/93, pulse (!) 130, temperature (!) 96.5 °F (35.8 °C), temperature source Temporal, resp. rate 20, height 5' 4" (1.626 m), weight 87.7 kg (193 lb 5.5 oz), SpO2 94 %, not currently breastfeeding. ,Body mass index is 33.19 kg/m².       Intake/Output Summary (Last 24 hours) at 7/23/2023 2143  Last data filed at 7/23/2023 1932  Gross per 24 hour   Intake 480 ml   Output 1350 ml   Net -870 ml       Invasive Devices     Peripheral Intravenous Line  Duration           Peripheral IV 07/21/23 Proximal;Right;Ventral (anterior) Forearm 2 days    Peripheral IV 07/22/23 Left;Ventral (anterior) Forearm 1 day    Peripheral IV 07/23/23 Left;Proximal;Ventral (anterior) Forearm <1 day          Drain  Duration           External Urinary Catheter <1 day                Physical Exam:   Gen: NAD, Comfortable  Neuro: A&O, No focal deficits  Head: Normal Cephalic, Atraumatic  Eye: EOMI, PERRLA, No scleral icterus  Neck: Supple, No JVD, Midline trachea  CV: RRR, Cap refill <2 sec  Pulm: Normal work of breathing, no respiratory distress  Abd: Soft, Non-Distended, tender bilateral lower quadrants  Ext: No edema, Non-tender  Skin: warm, dry, intact

## 2023-07-24 NOTE — PLAN OF CARE
Problem: OCCUPATIONAL THERAPY ADULT  Goal: Performs self-care activities at highest level of function for planned discharge setting. See evaluation for individualized goals. Description: Treatment Interventions: ADL retraining, Functional transfer training, Endurance training, Patient/family training, Compensatory technique education, Energy conservation, Activityengagement, Equipment evaluation/education          See flowsheet documentation for full assessment, interventions and recommendations. Outcome: Progressing  Note: Limitation: Decreased ADL status, Decreased self-care trans, Decreased high-level ADLs, Decreased endurance  Prognosis: Good  Assessment: Pt seen for skilled OT session focused on ADLs, functional transfers and mobility, UE exercises, EC education. Pt seated in recliner upon arrival. Pt w/ MIN assist sit>stand from chair w/ cues for positioning. Pt w/ MIN assist x1 functional mobility w/ RW to bathroom and transfer w/ BSC over toilet and grab bar use and min assist steadying during toileting hygiene. Pt w/ MIN assist steadying grooming at sink to brush teeth and wash face. Pt w/ MIN assist functional mobility w/ RW from bathroom to recliner. Pt w/ MIN assist stand>sit transfer to chair w/ increased time to complete. Pt w/ SPO2 on 6L O2 97-99% w/ activity however requiring rest breaks and cues for pursed lip breathing (provided w/ handout). Pt w/ MIN assist LB ADLs and educated on compensatory techniques crossed Leg for LB ADLs. Pt completed b/l UE exercises seen above w/ cues for correct techniques and needed rest breaks t/o session. Pt seated upright in chair end of session w/ all needs met. Pt w/ improvement in endurance and ADL performance this session. Pt continues to be limited due to decreased strength and endurance, impaired balance, impaired activity tolerance, increased pain, multiple lines, increased fatigue all causing a decline in ADLs, functional transfers and mobility. Recommend STR when medically stable. Will continue to follow. The patient's raw score on the AM-PAC Daily Activity Inpatient Short Form is 17. A raw score of less than 19 suggests the patient may benefit from discharge to post-acute rehabilitation services. Please refer to the recommendation of the Occupational Therapist for safe discharge planning.      OT Discharge Recommendation: Post acute rehabilitation services

## 2023-07-24 NOTE — ASSESSMENT & PLAN NOTE
• Due to persistent RVR he was placed on amiodarone by cardiology  • Ongoing heparin drip while off Eliquis  • Cardiology is managing amiodarone and rate control regimen

## 2023-07-24 NOTE — PHYSICAL THERAPY NOTE
Physical Therapy Treatment Note     07/24/23 1146   PT Last Visit   PT Visit Date 07/24/23   Note Type   Note Type Treatment   Pain Assessment   Pain Assessment Tool 0-10   Pain Score 6   Pain Location/Orientation Location: Abdomen; Location: Incision   Restrictions/Precautions   Weight Bearing Precautions Per Order No   Other Precautions Chair Alarm; Fall Risk;Multiple lines;Telemetry;O2;Pain   General   Chart Reviewed Yes   Family/Caregiver Present No   Subjective   Subjective Pt. seated on chair upon entry. AGreeable to PT   Bed Mobility   Sit to Supine 4  Minimal assistance   Additional items Assist x 1; Increased time required;LE management;Verbal cues; Bedrails  (Log roll)   Transfers   Sit to Stand 4  Minimal assistance   Additional items Assist x 1;Verbal cues; Increased time required;Armrests   Stand to Sit 4  Minimal assistance   Additional items Assist x 1; Increased time required;Armrests   Ambulation/Elevation   Gait pattern Improper Weight shift;Retropulsion;Decreased foot clearance; Short stride; Excessively slow;Decreased heel strike;Decreased toe off   Gait Assistance 4  Minimal assist   Additional items Assist x 1;Verbal cues   Assistive Device Rolling walker   Distance 8 steps to the EOB   Balance   Static Sitting Fair +   Dynamic Sitting Fair -   Static Standing Fair   Dynamic Standing Fair -   Ambulatory Fair -   Endurance Deficit   Endurance Deficit Yes   Endurance Deficit Description fatigue, pain   Activity Tolerance   Activity Tolerance Patient tolerated treatment well;Patient limited by pain; Patient limited by fatigue   Nurse Made Aware yes   Exercises   TKR Sitting;Bilateral;AROM;20 reps   Assessment   Prognosis Fair   Problem List Decreased strength;Decreased endurance; Impaired balance;Decreased mobility; Impaired judgement;Decreased safety awareness;Pain;Obesity   Assessment Pt. able to tolerate all activities well this session.  Pt. had one episode of posterior LOB during STS transfer as she had both hands on the RW. Cues for hand placement given. All tasks completed at a slow pace. Log roll performed for sit to supine transfer. Pt. back in bed as patient was getting PICC line and hence the session was shortened. Will continue to follow per PT POC   Barriers to Discharge None   Goals   Patient Goals None reported   STG Expiration Date 07/31/23   PT Treatment Day 1   Plan   Treatment/Interventions Functional transfer training;LE strengthening/ROM; Therapeutic exercise;Spoke to nursing;Bed mobility; Equipment eval/education;Patient/family training   Progress Progressing toward goals   PT Frequency 3-5x/wk   Recommendation   PT Discharge Recommendation Post acute rehabilitation services   Equipment Recommended [facility] Recommended Wheeled walker   AM-PAC Basic Mobility Inpatient   Turning in Flat Bed Without Bedrails 3   Lying on Back to Sitting on Edge of Flat Bed Without Bedrails 3   Moving Bed to Chair 3   Standing Up From Chair Using Arms 3   Walk in Room 3   Climb 3-5 Stairs With Railing 2   Basic Mobility Inpatient Raw Score 17   Basic Mobility Standardized Score 39.67   Highest Level Of Mobility   JH-HLM Goal 5: Stand one or more mins   JH-HLM Achieved 4: Move to chair/commode   End of Consult   Patient Position at End of Consult Supine; All needs within reach;Bed/Chair alarm activated           Meseret Edge PTA    An AM-PAC basic mobility standardized score less than 42.9 suggest the patient may benefit from discharge to post-acute rehab services.

## 2023-07-24 NOTE — ASSESSMENT & PLAN NOTE
· Status post laparotomy, colon resection, anastomosis on 7/19/2023  · Intraoperatively, she was found to have ischemia of the transverse, distal and sigmoid colon  · Postop management, diet and pain control per primary service  · Repeat CT for persistent post op pain pending

## 2023-07-24 NOTE — PLAN OF CARE
Problem: PHYSICAL THERAPY ADULT  Goal: Performs mobility at highest level of function for planned discharge setting. See evaluation for individualized goals. Outcome: Progressing  Note: Prognosis: Fair  Problem List: Decreased strength, Decreased endurance, Impaired balance, Decreased mobility, Impaired judgement, Decreased safety awareness, Pain, Obesity  Assessment: Pt. able to tolerate all activities well this session. Pt. had one episode of posterior LOB during STS transfer as she had both hands on the RW. Cues for hand placement given. All tasks completed at a slow pace. Log roll performed for sit to supine transfer. Pt. back in bed as patient was getting PICC line and hence the session was shortened. Will continue to follow per PT POC  Barriers to Discharge: None     PT Discharge Recommendation: Post acute rehabilitation services    See flowsheet documentation for full assessment.

## 2023-07-24 NOTE — PROGRESS NOTES
[facility]  Progress Note  Name: Alexandro Alicea  MRN: [id number]  Unit/Bed#: E4 -01 I Date of Admission: 7/18/2023   Date of Service: 7/24/2023 I Hospital Day: 5    Assessment/Plan   * Colonic ischemia [facility])  Assessment & Plan  · Status post laparotomy, colon resection, anastomosis on 7/19/2023  · Intraoperatively, she was found to have ischemia of the transverse, distal and sigmoid colon  · Postop management, diet and pain control per primary service  · Repeat CT for persistent post op pain pending    Paroxysmal atrial fibrillation ([address])  Assessment & Plan  • Due to persistent RVR he was placed on amiodarone by cardiology  • Ongoing heparin drip while off Eliquis  • Cardiology is managing amiodarone and rate control regimen    COPD (chronic obstructive pulmonary disease) ([address])  Assessment & Plan  Stable without exacerbation  Continue Breo 1 puff daily  Continue albuterol as needed    Essential hypertension  Assessment & Plan  · Continue metoprolol and cardizem with hold parameters    Left carotid stenosis  Assessment & Plan  · Managed medically with aspirin and anticoagulation  · Currently on heparin infusion  · Resume aspirin when okay with primary service  Background:  · Pt was admitted 9/22 on the stroke pathway:  MRI negative and sx were attributed to migrane and discharged on ASA and eliquis  · She was diagnosed with left ICA stenosis 80% and underwent unsuccessful CEA with subsequent left facial numbness and left facial asymmetry  · Admitted 2/23 with left facial droop and BUE paresthesias with negative stroke work up that admission               VTE Pharmacologic Prophylaxis: VTE Score: 3 Moderate Risk (Score 3-4) - Pharmacological DVT Prophylaxis Ordered: heparin drip. Patient Centered Rounds: I performed bedside rounds with nursing staff today.      Current Length of Stay: 5 day(s)  Current Patient Status: Inpatient   Code Status: Level 1 - Full Code    Subjective: She passes gas but no bowel movement  Intermittent abdominal pain  Per nurse, pain is better controlled and not requiring as much opioids than before     Objective:     Vitals:   Temp (24hrs), Av.7 °F (35.9 °C), Min:95.5 °F (35.3 °C), Max:97.6 °F (36.4 °C)    Temp:  [95.5 °F (35.3 °C)-97.6 °F (36.4 °C)] 96.4 °F (35.8 °C)  HR:  [] 100  Resp:  [16-22] 20  BP: ()/() 140/90  SpO2:  [91 %-98 %] 98 %  Body mass index is 33.19 kg/m². Input and Output Summary (last 24 hours): Intake/Output Summary (Last 24 hours) at 2023 1716  Last data filed at 2023 0001  Gross per 24 hour   Intake 100 ml   Output 1550 ml   Net -1450 ml       Physical Exam:   Physical Exam  Vitals reviewed. Constitutional:       Appearance: She is not ill-appearing. HENT:      Head: Normocephalic and atraumatic. Nose: No congestion or rhinorrhea. Eyes:      General: No scleral icterus. Cardiovascular:      Rate and Rhythm: Tachycardia present. Rhythm irregular. Abdominal:      Comments: Incisions look good without redness, discharge  or bleeding  Bowel sounds are more audible   Musculoskeletal:      Right lower leg: No edema. Left lower leg: No edema. Skin:     General: Skin is warm and dry. Neurological:      Mental Status: She is oriented to person, place, and time. Psychiatric:         Behavior: Behavior normal.         Additional Data:     Labs:  Results from last 7 days   Lab Units 23  0212 23  0559 23  0416   WBC Thousand/uL 8.79   < > 15.73*   HEMOGLOBIN g/dL 9.3*   < > 12.3   HEMATOCRIT % 29.0*   < > 37.4   PLATELETS Thousands/uL 226   < > 177   BANDS PCT %  --   --  31*   NEUTROS PCT % 69   < >  --    LYMPHS PCT % 14   < >  --    LYMPHO PCT %  --   --  4*   MONOS PCT % 8   < >  --    MONO PCT %  --   --  0*   EOS PCT % 6   < > 0    < > = values in this interval not displayed.      Results from last 7 days   Lab Units 23  0212 23  0728 23  8965 SODIUM mmol/L 138   < > 138   POTASSIUM mmol/L 3.0*   < > 3.3*   CHLORIDE mmol/L 101   < > 105   CO2 mmol/L 28   < > 19*   BUN mg/dL 8   < > 18   CREATININE mg/dL 0.53*   < > 0.99   ANION GAP mmol/L 9   < > 14   CALCIUM mg/dL 8.1*   < > 9.5   ALBUMIN g/dL  --   --  4.7   TOTAL BILIRUBIN mg/dL  --   --  0.47   ALK PHOS U/L  --   --  118*   ALT U/L  --   --  14   AST U/L  --   --  17   GLUCOSE RANDOM mg/dL 95   < > 149*    < > = values in this interval not displayed. Results from last 7 days   Lab Units 23  0559   INR  1.49*     Results from last 7 days   Lab Units 23  0513   POC GLUCOSE mg/dl 77         Results from last 7 days   Lab Units 23  0318 23  2347 23  1512 23  1054 23  0728   LACTIC ACID mmol/L 1.7 2.8* 1.4 3.0*   < > 4.0*   PROCALCITONIN ng/ml  --   --   --   --   --  0.42*    < > = values in this interval not displayed. Lines/Drains:  Invasive Devices     Peripherally Inserted Central Catheter Line  Duration           PICC Line  Right Basilic <1 day          Peripheral Intravenous Line  Duration           Peripheral IV 23 Left;Ventral (anterior) Forearm 2 days    Peripheral IV 23 Right;Ventral (anterior) Forearm <1 day          Drain  Duration           External Urinary Catheter 1 day                Central Line:         Telemetry:  Telemetry Orders (From admission, onward)             Telemetry Monitoring  Continuous x 24 Hours (Telem)           Question:  Reason for 24 Hour Telemetry  Answer:  Arrhythmias requiring acute medical intervention / PPM or ICD malfunction                           Imaging: No pertinent imaging reviewed.     Recent Cultures (last 7 days):         Last 24 Hours Medication List:   Current Facility-Administered Medications   Medication Dose Route Frequency Provider Last Rate   • acetaminophen  650 mg Oral Q6H [address] Haylee Murray MD     • aluminum-magnesium hydroxide-simethicone  30 mL Oral Q6H PRN Kishan Castano MD     • amiodarone (CORDARONE) 900 mg in dextrose 5 % 500 mL infusion  0.5 mg/min Intravenous Continuous Ji Aceves MD 0.5 mg/min (07/23/23 1932)   • amiodarone  200 mg Oral BID With Meals Ji Aceves MD     • calcium gluconate  2 g Intravenous Once Fredrick Sweeney MD     • cefTRIAXone  1,000 mg Intravenous Q24H Kishan Castano MD Stopped (07/24/23 0645)   • chlorhexidine  15 mL Mouth/Throat Q12H [address] Kishan Castano MD     • diltiazem  10 mg Intravenous Q3H PRN AVTAR BookerC     • diltiazem  30 mg Oral Q6H [address] Ji Aceves MD     • diphenhydrAMINE  25 mg Intravenous HS PRN LIBERTY ChavezNP     • famotidine  20 mg Oral HS Kishan Castano MD     • fluticasone-vilanterol  1 puff Inhalation Daily Kishan Castano MD     • furosemide  40 mg Oral Daily AVTAR BookerC     • heparin (porcine)  3-20 Units/kg/hr (Order-Specific) Intravenous Titrated Kishan Castano MD 23 Units/kg/hr (07/24/23 7913)   • heparin (porcine)  2,000 Units Intravenous Q6H PRN Kishan Castano MD     • heparin (porcine)  4,000 Units Intravenous Q6H PRN Kishan Castano MD     • LORazepam  1 mg Oral TID PRN LIBERTY ChavezNP     • methocarbamol  500 mg Oral Q6H [address] Kishan Castano MD     • metoprolol  10 mg Intravenous Q6H PRN Kishan Castano MD     • metoprolol tartrate  100 mg Oral Q12H [address] Kishan Castano MD     • morphine injection  4 mg Intravenous Q2H PRN JUD Chavez     • naloxone  0.04 mg Intravenous Q1MIN PRN Fredrick Sweeney MD     • ondansetron  4 mg Intravenous Q6H PRN Kishan Castano MD     • oxyCODONE  10 mg Oral Q4H PRN Fredrick Sweeney MD     • oxyCODONE  5 mg Oral Q4H PRN Fredrick Sweeney MD     • potassium chloride  40 mEq Oral Once Fredrick Sweeney MD     • potassium phosphate  30 mmol Intravenous Once Fredrick Sweeney MD     • sucralfate  1 g Oral 4x Daily (Josie Stapleton ) Kishan Castano MD          Today, Patient Was Seen By: Lloyd Mason MD    **Please Note: This note may have been constructed using a voice recognition system. **

## 2023-07-24 NOTE — PLAN OF CARE
Problem: INFECTION - ADULT  Goal: Absence or prevention of progression during hospitalization  Description: INTERVENTIONS:  Picc procedure and maintenance  - Assess and monitor for signs and symptoms of infection  - Monitor lab/diagnostic results  - Monitor all insertion sites, i.e. indwelling lines, tubes, and drains  - Monitor endotracheal if appropriate and nasal secretions for changes in amount and color  - [place] appropriate cooling/warming therapies per order  - Administer medications as ordered  - Instruct and encourage patient and family to use good hand hygiene technique  - Identify and instruct in appropriate isolation precautions for identified infection/condition  Outcome: Progressing     Problem: Knowledge Deficit  Goal: Patient/family/caregiver demonstrates understanding of disease process, treatment plan, medications, and discharge instructions  Description: Complete learning assessment and assess knowledge base.   Interventions:  Picc procedure and maintenance  - Provide teaching at level of understanding  - Provide teaching via preferred learning methods  Outcome: Progressing

## 2023-07-24 NOTE — NURSING NOTE
3 Honey Abreu request. Advised PICC team request should be ordered as IR schedule today will likely not allow for procedure. Also advised that other  vasc access teams may be available as well.

## 2023-07-24 NOTE — PROCEDURES
Insert Complex Venous Access Line    Date/Time: 7/24/2023 12:36 PM    Performed by: Janessa Harris RN  Authorized by: Any Devries MD    Patient location:  Bedside  Consent:     Consent obtained:  Written    Consent given by:  Patient    Procedural risks discussed: consent obtained by physician. [place] protocol:     Procedure explained and questions answered to patient or proxy's satisfaction: yes      Relevant documents present and verified: yes      Test results available and properly labeled: yes      Radiology Images displayed and confirmed. If images not available, report reviewed: yes      Required blood products, implants, devices, and special equipment available: yes      Site/side marked: yes      Immediately prior to procedure, a time out was called: yes      Patient identity confirmed:  Verbally with patient, arm band, provided demographic data and hospital-assigned identification number  Pre-procedure details:     Hand hygiene: Hand hygiene performed prior to insertion      Sterile barrier technique: All elements of maximal sterile technique followed      Skin preparation:  ChloraPrep    Skin preparation agent: Skin preparation agent completely dried prior to procedure    Indications:     PICC line indications: other (comment)      PICC line indications comment:  Multiple iv meds and incompatibility   Anesthesia (see MAR for exact dosages):      Anesthesia method:  Local infiltration (3ml)    Local anesthetic:  Lidocaine 1% w/o epi  Procedure details:     Location:  Basilic    Vessel type: vein      Laterality:  Right    Approach: percutaneous technique used      Patient position:  Flat    Procedural supplies:  Double lumen (confirmed double lumen with Dr Karen Leiva)    Catheter size:  5 Fr    Landmarks identified: yes      Ultrasound guidance: yes      Ultrasound image availability:  Not saved    Sterile ultrasound techniques: Sterile gel and sterile probe covers were used      Number of attempts:  1    Successful placement: yes      Vessel of catheter tip end:  Chest Xray needed to confirm placement    Total catheter length (cm):  41    Catheter out on skin (cm):  0    Max flow rate:  999ml/hr    Arm circumference:  35cm  Post-procedure details:     Post-procedure:  Dressing applied and securement device placed    Assessment:  Blood return through all ports, free fluid flow and placement verified by x-ray (cxr confirmed picc terminates SVC, picc okay to be utilized)    Post-procedure complications: none      Patient tolerance of procedure:   Tolerated well, no immediate complications  Comments:      [facility]#[id number] 2024-05-31

## 2023-07-24 NOTE — ASSESSMENT & PLAN NOTE
· Managed medically with aspirin and anticoagulation  · Currently on heparin infusion  · Resume aspirin when okay with primary service  Background:  · Pt was admitted 9/22 on the stroke pathway:  MRI negative and sx were attributed to migrane and discharged on ASA and eliquis  · She was diagnosed with left ICA stenosis 80% and underwent unsuccessful CEA with subsequent left facial numbness and left facial asymmetry  · Admitted 2/23 with left facial droop and BUE paresthesias with negative stroke work up that admission

## 2023-07-25 LAB
ANION GAP SERPL CALCULATED.3IONS-SCNC: 8 MMOL/L
APTT PPP: >210 SECONDS (ref 23–37)
BASOPHILS # BLD AUTO: 0.04 THOUSANDS/ÂΜL (ref 0–0.1)
BASOPHILS NFR BLD AUTO: 1 % (ref 0–1)
BUN SERPL-MCNC: 6 MG/DL (ref 5–25)
CALCIUM SERPL-MCNC: 8.3 MG/DL (ref 8.4–10.2)
CHLORIDE SERPL-SCNC: 100 MMOL/L (ref 96–108)
CO2 SERPL-SCNC: 30 MMOL/L (ref 21–32)
CREAT SERPL-MCNC: 0.44 MG/DL (ref 0.6–1.3)
EOSINOPHIL # BLD AUTO: 0.46 THOUSAND/ÂΜL (ref 0–0.61)
EOSINOPHIL NFR BLD AUTO: 6 % (ref 0–6)
ERYTHROCYTE [DISTWIDTH] IN BLOOD BY AUTOMATED COUNT: 13.2 % (ref 11.6–15.1)
GFR SERPL CREATININE-BSD FRML MDRD: 106 ML/MIN/1.73SQ M
GLUCOSE SERPL-MCNC: 99 MG/DL (ref 65–140)
HCT VFR BLD AUTO: 26.2 % (ref 34.8–46.1)
HGB BLD-MCNC: 8.5 G/DL (ref 11.5–15.4)
IMM GRANULOCYTES # BLD AUTO: 0.16 THOUSAND/UL (ref 0–0.2)
IMM GRANULOCYTES NFR BLD AUTO: 2 % (ref 0–2)
LYMPHOCYTES # BLD AUTO: 1.31 THOUSANDS/ÂΜL (ref 0.6–4.47)
LYMPHOCYTES NFR BLD AUTO: 16 % (ref 14–44)
MAGNESIUM SERPL-MCNC: 1.8 MG/DL (ref 1.9–2.7)
MCH RBC QN AUTO: 30 PG (ref 26.8–34.3)
MCHC RBC AUTO-ENTMCNC: 32.4 G/DL (ref 31.4–37.4)
MCV RBC AUTO: 93 FL (ref 82–98)
MONOCYTES # BLD AUTO: 0.73 THOUSAND/ÂΜL (ref 0.17–1.22)
MONOCYTES NFR BLD AUTO: 9 % (ref 4–12)
NEUTROPHILS # BLD AUTO: 5.5 THOUSANDS/ÂΜL (ref 1.85–7.62)
NEUTS SEG NFR BLD AUTO: 66 % (ref 43–75)
NRBC BLD AUTO-RTO: 0 /100 WBCS
PHOSPHATE SERPL-MCNC: 3 MG/DL (ref 2.3–4.1)
PLATELET # BLD AUTO: 221 THOUSANDS/UL (ref 149–390)
PMV BLD AUTO: 10.2 FL (ref 8.9–12.7)
POTASSIUM SERPL-SCNC: 3.4 MMOL/L (ref 3.5–5.3)
RBC # BLD AUTO: 2.83 MILLION/UL (ref 3.81–5.12)
SODIUM SERPL-SCNC: 138 MMOL/L (ref 135–147)
WBC # BLD AUTO: 8.2 THOUSAND/UL (ref 4.31–10.16)

## 2023-07-25 PROCEDURE — 85025 COMPLETE CBC W/AUTO DIFF WBC: CPT | Performed by: PHYSICIAN ASSISTANT

## 2023-07-25 PROCEDURE — 99024 POSTOP FOLLOW-UP VISIT: CPT | Performed by: SURGERY

## 2023-07-25 PROCEDURE — 80048 BASIC METABOLIC PNL TOTAL CA: CPT | Performed by: PHYSICIAN ASSISTANT

## 2023-07-25 PROCEDURE — 83735 ASSAY OF MAGNESIUM: CPT | Performed by: PHYSICIAN ASSISTANT

## 2023-07-25 PROCEDURE — 99232 SBSQ HOSP IP/OBS MODERATE 35: CPT | Performed by: INTERNAL MEDICINE

## 2023-07-25 PROCEDURE — 88307 TISSUE EXAM BY PATHOLOGIST: CPT | Performed by: PATHOLOGY

## 2023-07-25 PROCEDURE — 84100 ASSAY OF PHOSPHORUS: CPT | Performed by: PHYSICIAN ASSISTANT

## 2023-07-25 PROCEDURE — 85730 THROMBOPLASTIN TIME PARTIAL: CPT | Performed by: INTERNAL MEDICINE

## 2023-07-25 PROCEDURE — 99231 SBSQ HOSP IP/OBS SF/LOW 25: CPT | Performed by: INTERNAL MEDICINE

## 2023-07-25 RX ORDER — POTASSIUM CHLORIDE 20 MEQ/1
40 TABLET, EXTENDED RELEASE ORAL 2 TIMES DAILY
Status: DISCONTINUED | OUTPATIENT
Start: 2023-07-25 | End: 2023-07-26

## 2023-07-25 RX ORDER — POTASSIUM CHLORIDE 20 MEQ/1
40 TABLET, EXTENDED RELEASE ORAL ONCE
Status: COMPLETED | OUTPATIENT
Start: 2023-07-25 | End: 2023-07-25

## 2023-07-25 RX ORDER — FUROSEMIDE 10 MG/ML
40 INJECTION INTRAMUSCULAR; INTRAVENOUS
Status: DISCONTINUED | OUTPATIENT
Start: 2023-07-25 | End: 2023-07-26

## 2023-07-25 RX ORDER — AMIODARONE HYDROCHLORIDE 200 MG/1
200 TABLET ORAL ONCE
Status: COMPLETED | OUTPATIENT
Start: 2023-07-25 | End: 2023-07-25

## 2023-07-25 RX ORDER — LANOLIN ALCOHOL/MO/W.PET/CERES
400 CREAM (GRAM) TOPICAL ONCE
Status: COMPLETED | OUTPATIENT
Start: 2023-07-25 | End: 2023-07-25

## 2023-07-25 RX ORDER — AMIODARONE HYDROCHLORIDE 200 MG/1
400 TABLET ORAL 2 TIMES DAILY WITH MEALS
Status: DISCONTINUED | OUTPATIENT
Start: 2023-07-25 | End: 2023-07-28 | Stop reason: HOSPADM

## 2023-07-25 RX ORDER — POTASSIUM CHLORIDE 14.9 MG/ML
20 INJECTION INTRAVENOUS ONCE
Status: COMPLETED | OUTPATIENT
Start: 2023-07-25 | End: 2023-07-25

## 2023-07-25 RX ORDER — POTASSIUM CHLORIDE 20 MEQ/1
20 TABLET, EXTENDED RELEASE ORAL ONCE
Status: COMPLETED | OUTPATIENT
Start: 2023-07-25 | End: 2023-07-25

## 2023-07-25 RX ADMIN — FLUTICASONE FUROATE AND VILANTEROL TRIFENATATE 1 PUFF: 200; 25 POWDER RESPIRATORY (INHALATION) at 08:25

## 2023-07-25 RX ADMIN — DILTIAZEM HYDROCHLORIDE 30 MG: 30 TABLET, FILM COATED ORAL at 17:25

## 2023-07-25 RX ADMIN — APIXABAN 5 MG: 5 TABLET, FILM COATED ORAL at 17:25

## 2023-07-25 RX ADMIN — METHOCARBAMOL 500 MG: 500 TABLET ORAL at 11:55

## 2023-07-25 RX ADMIN — CHLORHEXIDINE GLUCONATE 15 ML: 1.2 RINSE ORAL at 21:30

## 2023-07-25 RX ADMIN — POTASSIUM CHLORIDE 40 MEQ: 1500 TABLET, EXTENDED RELEASE ORAL at 17:25

## 2023-07-25 RX ADMIN — POTASSIUM CHLORIDE 20 MEQ: 14.9 INJECTION, SOLUTION INTRAVENOUS at 06:26

## 2023-07-25 RX ADMIN — CHLORHEXIDINE GLUCONATE 15 ML: 1.2 RINSE ORAL at 08:25

## 2023-07-25 RX ADMIN — POTASSIUM CHLORIDE 20 MEQ: 1500 TABLET, EXTENDED RELEASE ORAL at 10:09

## 2023-07-25 RX ADMIN — DILTIAZEM HYDROCHLORIDE 30 MG: 30 TABLET, FILM COATED ORAL at 11:55

## 2023-07-25 RX ADMIN — ALUMINUM HYDROXIDE, MAGNESIUM HYDROXIDE, AND SIMETHICONE 30 ML: 200; 200; 20 SUSPENSION ORAL at 17:27

## 2023-07-25 RX ADMIN — SUCRALFATE 1 G: 1 TABLET ORAL at 21:30

## 2023-07-25 RX ADMIN — SUCRALFATE 1 G: 1 TABLET ORAL at 15:45

## 2023-07-25 RX ADMIN — FUROSEMIDE 40 MG: 40 TABLET ORAL at 08:25

## 2023-07-25 RX ADMIN — OXYCODONE HYDROCHLORIDE 5 MG: 5 TABLET ORAL at 06:32

## 2023-07-25 RX ADMIN — POTASSIUM CHLORIDE 40 MEQ: 1500 TABLET, EXTENDED RELEASE ORAL at 06:25

## 2023-07-25 RX ADMIN — METHOCARBAMOL 500 MG: 500 TABLET ORAL at 17:25

## 2023-07-25 RX ADMIN — METOPROLOL TARTRATE 100 MG: 100 TABLET, FILM COATED ORAL at 21:30

## 2023-07-25 RX ADMIN — ACETAMINOPHEN 325MG 650 MG: 325 TABLET ORAL at 05:53

## 2023-07-25 RX ADMIN — OXYCODONE HYDROCHLORIDE 10 MG: 10 TABLET ORAL at 00:00

## 2023-07-25 RX ADMIN — AMIODARONE HYDROCHLORIDE 200 MG: 200 TABLET ORAL at 10:07

## 2023-07-25 RX ADMIN — ASPIRIN 81 MG: 81 TABLET, COATED ORAL at 10:07

## 2023-07-25 RX ADMIN — SUCRALFATE 1 G: 1 TABLET ORAL at 11:55

## 2023-07-25 RX ADMIN — CEFTRIAXONE 1000 MG: 1 INJECTION, SOLUTION INTRAVENOUS at 05:54

## 2023-07-25 RX ADMIN — AMIODARONE HYDROCHLORIDE 200 MG: 200 TABLET ORAL at 08:25

## 2023-07-25 RX ADMIN — ACETAMINOPHEN 325MG 650 MG: 325 TABLET ORAL at 11:55

## 2023-07-25 RX ADMIN — ONDANSETRON 4 MG: 2 INJECTION INTRAMUSCULAR; INTRAVENOUS at 21:31

## 2023-07-25 RX ADMIN — FAMOTIDINE 20 MG: 20 TABLET ORAL at 21:31

## 2023-07-25 RX ADMIN — SUCRALFATE 1 G: 1 TABLET ORAL at 05:53

## 2023-07-25 RX ADMIN — OXYCODONE HYDROCHLORIDE 5 MG: 5 TABLET ORAL at 13:56

## 2023-07-25 RX ADMIN — AMIODARONE HYDROCHLORIDE 400 MG: 200 TABLET ORAL at 15:44

## 2023-07-25 RX ADMIN — METHOCARBAMOL 500 MG: 500 TABLET ORAL at 05:53

## 2023-07-25 RX ADMIN — METOPROLOL TARTRATE 100 MG: 100 TABLET, FILM COATED ORAL at 08:25

## 2023-07-25 RX ADMIN — ONDANSETRON 4 MG: 2 INJECTION INTRAMUSCULAR; INTRAVENOUS at 13:56

## 2023-07-25 RX ADMIN — FUROSEMIDE 40 MG: 10 INJECTION, SOLUTION INTRAVENOUS at 15:45

## 2023-07-25 RX ADMIN — ACETAMINOPHEN 325MG 650 MG: 325 TABLET ORAL at 17:25

## 2023-07-25 RX ADMIN — MAGNESIUM OXIDE TAB 400 MG (241.3 MG ELEMENTAL MG) 400 MG: 400 (241.3 MG) TAB at 10:09

## 2023-07-25 RX ADMIN — POTASSIUM CHLORIDE 40 MEQ: 1500 TABLET, EXTENDED RELEASE ORAL at 10:06

## 2023-07-25 RX ADMIN — OXYCODONE HYDROCHLORIDE 5 MG: 5 TABLET ORAL at 19:39

## 2023-07-25 RX ADMIN — MORPHINE SULFATE 4 MG: 4 INJECTION INTRAVENOUS at 21:31

## 2023-07-25 RX ADMIN — APIXABAN 5 MG: 5 TABLET, FILM COATED ORAL at 10:07

## 2023-07-25 NOTE — PROGRESS NOTES
Progress Note- General Surgery  Abiodun Cunningham 70 y.o. female MRN: [id number]  Unit/Bed#: E4 -01 Encounter: [id number]    Assessment:  70 F w/ pmh of HTN, HLD, afib, CAD, COPD, SMA thrombosis s/p stent presents with abdominal pain, hematochezia and c/f ischemic colitis. Now s/p diagnostic lap converted to ex lap with extended left hemicolectomy and primary anastomosis 7/19       Plan:  • Lo res diet  • Continue doac  • Appreciate Cards recs  • Appreciate slim recs  • PT/OT-post acute rehab  • dispo planning  • Please TigerText on call [facility] surgery resident or AP with any questions     Subjective/Objective     Subjective:   No acute events overnight. Some discomfort with regular diet yesterday. Had some nausea but no vomiting. Still passing gas. Worked with PT but had not been up out of bed walking. Pertinent review of systems as above. All other review of systems negative. Objective:    Blood pressure 140/82, pulse 85, temperature 98.2 °F (36.8 °C), temperature source Temporal, resp. rate 20, height 5' 4" (1.626 m), weight 87 kg (191 lb 12.8 oz), SpO2 91 %, not currently breastfeeding. ,Body mass index is 32.92 kg/m². Intake/Output Summary (Last 24 hours) at 7/26/2023 8947  Last data filed at 7/26/2023 9093  Gross per 24 hour   Intake --   Output 1500 ml   Net -1500 ml       Invasive Devices     Peripherally Inserted Central Catheter Line  Duration           PICC Line 61/70/26 Right Basilic 1 day          Peripheral Intravenous Line  Duration           Peripheral IV 07/24/23 Right;Ventral (anterior) Forearm 2 days          Drain  Duration           External Urinary Catheter 2 days                Physical Exam:   Gen:  NAD. HEENT: NCAT. MMM  CV: well perfused  Lungs: Normal respiratory effort  Abd: soft, nt/nd,incisions CDI  Skin: warm/ dry  Extremities: no peripheral edema, no clubbing or cyanosis  Neuro: AxO x3    I have personally reviewed pertinent films in PACS.

## 2023-07-25 NOTE — ASSESSMENT & PLAN NOTE
• Due to persistent RVR he was placed on amiodarone by cardiology  • Now back on eliquis  • Amiodarone dose increased by cardiology  • Rate is better now

## 2023-07-25 NOTE — PLAN OF CARE
Problem: PAIN - ADULT  Goal: Verbalizes/displays adequate comfort level or baseline comfort level  Description: Interventions:  - Encourage patient to monitor pain and request assistance  - Assess pain using appropriate pain scale  - Administer analgesics based on type and severity of pain and evaluate response  - Implement non-pharmacological measures as appropriate and evaluate response  - Consider cultural and social influences on pain and pain management  - Notify physician/advanced practitioner if interventions unsuccessful or patient reports new pain  Outcome: Progressing     Problem: INFECTION - ADULT  Goal: Absence or prevention of progression during hospitalization  Description: INTERVENTIONS:  - Assess and monitor for signs and symptoms of infection  - Monitor lab/diagnostic results  - Monitor all insertion sites, i.e. indwelling lines, tubes, and drains  - Monitor endotracheal if appropriate and nasal secretions for changes in amount and color  - [place] appropriate cooling/warming therapies per order  - Administer medications as ordered  - Instruct and encourage patient and family to use good hand hygiene technique  - Identify and instruct in appropriate isolation precautions for identified infection/condition  Outcome: Progressing  Goal: Absence of fever/infection during neutropenic period  Description: INTERVENTIONS:  - Monitor WBC    Outcome: Progressing     Problem: SAFETY ADULT  Goal: Patient will remain free of falls  Description: INTERVENTIONS:  - Educate patient/family on patient safety including physical limitations  - Instruct patient to call for assistance with activity   - Consult OT/PT to assist with strengthening/mobility   - Keep Call bell within reach  - Keep bed low and locked with side rails adjusted as appropriate  - Keep care items and personal belongings within reach  - Initiate and maintain comfort rounds  - Make Fall Risk Sign visible to staff  - Offer Toileting every 2 Hours, in advance of need  - Initiate/Maintain bed alarm  - Obtain necessary fall risk management equipment: bed alarm   - Apply yellow socks and bracelet for high fall risk patients  - Consider moving patient to room near nurses station  Outcome: Progressing  Goal: Maintain or return to baseline ADL function  Description: INTERVENTIONS:  -  Assess patient's ability to carry out ADLs; assess patient's baseline for ADL function and identify physical deficits which impact ability to perform ADLs (bathing, care of mouth/teeth, toileting, grooming, dressing, etc.)  - Assess/evaluate cause of self-care deficits   - Assess range of motion  - Assess patient's mobility; develop plan if impaired  - Assess patient's need for assistive devices and provide as appropriate  - Encourage maximum independence but intervene and supervise when necessary  - Involve family in performance of ADLs  - Assess for home care needs following discharge   - Consider OT consult to assist with ADL evaluation and planning for discharge  - Provide patient education as appropriate  Outcome: Progressing  Goal: Maintains/Returns to pre admission functional level  Description: INTERVENTIONS:  - Perform BMAT or MOVE assessment daily.   - Set and communicate daily mobility goal to care team and patient/family/caregiver. - Collaborate with rehabilitation services on mobility goals if consulted  - Perform Range of Motion 4 times a day. - Reposition patient every 2 hours.   - Dangle patient 4 times a day  - Stand patient 4 times a day  - Ambulate patient 4 times a day  - Out of bed to chair 4 times a day   - Out of bed for meals 44 times a day  - Out of bed for toileting  - Record patient progress and toleration of activity level   Outcome: Progressing     Problem: DISCHARGE PLANNING  Goal: Discharge to home or other facility with appropriate resources  Description: INTERVENTIONS:  - Identify barriers to discharge w/patient and caregiver  - Arrange for needed discharge resources and transportation as appropriate  - Identify discharge learning needs (meds, wound care, etc.)  - Arrange for interpretive services to assist at discharge as needed  - Refer to Case Management Department for coordinating discharge planning if the patient needs post-hospital services based on physician/advanced practitioner order or complex needs related to functional status, cognitive ability, or social support system  Outcome: Progressing     Problem: Knowledge Deficit  Goal: Patient/family/caregiver demonstrates understanding of disease process, treatment plan, medications, and discharge instructions  Description: Complete learning assessment and assess knowledge base.   Interventions:  - Provide teaching at level of understanding  - Provide teaching via preferred learning methods  Outcome: Progressing     Problem: Prexisting or High Potential for Compromised Skin Integrity  Goal: Skin integrity is maintained or improved  Description: INTERVENTIONS:  - Identify patients at risk for skin breakdown  - Assess and monitor skin integrity  - Assess and monitor nutrition and hydration status  - Monitor labs   - Assess for incontinence   - Turn and reposition patient  - Assist with mobility/ambulation  - Relieve pressure over bony prominences  - Avoid friction and shearing  - Provide appropriate hygiene as needed including keeping skin clean and dry  - Evaluate need for skin moisturizer/barrier cream  - Collaborate with interdisciplinary team   - Patient/family teaching  - Consider wound care consult   Outcome: Progressing     Problem: MOBILITY - ADULT  Goal: Maintain or return to baseline ADL function  Description: INTERVENTIONS:  -  Assess patient's ability to carry out ADLs; assess patient's baseline for ADL function and identify physical deficits which impact ability to perform ADLs (bathing, care of mouth/teeth, toileting, grooming, dressing, etc.)  - Assess/evaluate cause of self-care deficits - Assess range of motion  - Assess patient's mobility; develop plan if impaired  - Assess patient's need for assistive devices and provide as appropriate  - Encourage maximum independence but intervene and supervise when necessary  - Involve family in performance of ADLs  - Assess for home care needs following discharge   - Consider OT consult to assist with ADL evaluation and planning for discharge  - Provide patient education as appropriate  Outcome: Progressing  Goal: Maintains/Returns to pre admission functional level  Description: INTERVENTIONS:  - Perform BMAT or MOVE assessment daily.   - Set and communicate daily mobility goal to care team and patient/family/caregiver. - Collaborate with rehabilitation services on mobility goals if consulted  - Perform Range of Motion 4 times a day. - Reposition patient every 2 hours. - Dangle patient 4 times a day  - Stand patient 4 times a day  - Ambulate patient 4 times a day  - Out of bed to chair 4 times a day   - Out of bed for meals 4 times a day  - Out of bed for toileting  - Record patient progress and toleration of activity level   Outcome: Progressing     Problem: Nutrition/Hydration-ADULT  Goal: Nutrient/Hydration intake appropriate for improving, restoring or maintaining nutritional needs  Description: Monitor and assess patient's nutrition/hydration status for malnutrition. Collaborate with interdisciplinary team and initiate plan and interventions as ordered. Monitor patient's weight and dietary intake as ordered or per policy. Utilize nutrition screening tool and intervene as necessary. Determine patient's food preferences and provide high-protein, high-caloric foods as appropriate.      INTERVENTIONS:  - Monitor oral intake, urinary output, labs, and treatment plans  - Assess nutrition and hydration status and recommend course of action  - Evaluate amount of meals eaten  - Assist patient with eating if necessary   - Allow adequate time for meals  - Recommend/ encourage appropriate diets, oral nutritional supplements, and vitamin/mineral supplements  - Order, calculate, and assess calorie counts as needed  - Recommend, monitor, and adjust tube feedings and TPN/PPN based on assessed needs  - Assess need for intravenous fluids  - Provide specific nutrition/hydration education as appropriate  - Include patient/family/caregiver in decisions related to nutrition  Outcome: Progressing

## 2023-07-25 NOTE — OCCUPATIONAL THERAPY NOTE
Occupational Therapy Cancellation Note        Patient Name: Nancy Sidhu  Today's Date: 7/25/2023      Attempted to see patient and pt w/ increased pain and notified RN. Then RN wanted assistance w/ patient and pt able to scoot self to reposition on recliner. Pt w/ contact guard assist to stand and transfer from recliner and close supervision mobility to bed. Then returned to room to complete exercises w/ patient and pt declined due to pain in abdomen at this time. Will continue to follow to address OT POC.      Di Thompson MS, OTR/L

## 2023-07-25 NOTE — PROGRESS NOTES
Progress Note - Cardiology   Alonzo Bocanegra 70 y.o. female MRN: [id number]  Unit/Bed#: E4 -01 Encounter: [id number]    Assessment:    • Paroxysmal atrial fibrillation with a rapid ventricular response  • Tachycardia-bradycardia syndrome  • Ischemic colitis with hematochezia, prior history of SMA thrombosis, now status post hemicolectomy and primary anastomosis 7/19/2023  • Mild to moderate aortic regurgitation  • COPD  • Idiopathic intracranial hypertension with chronic migraine  • Primary hypertension  • Left carotid artery stenosis  • Fluid overload    Plan:    • We will increase amiodarone to 400 mg twice daily  • Change furosemide to IV 40 mg twice daily  • Increase oral potassium replacement  • Surgery feels it is feasible to convert from heparin to DOAC  • Continue to monitor electrolytes, renal function and volume status      Interval history:  Patient feeling short of breath today. Also complains of swelling in her legs. Concerned about her irregular rapid heart rate.     PROBLEM LIST:    Patient Active Problem List    Diagnosis Date Noted   • Acute postoperative pain 07/20/2023   • Colitis 07/19/2023   • Chronic migraine w/o aura w/o status migrainosus, not intractable 07/19/2023   • IIH (idiopathic intracranial hypertension) 07/19/2023   • Hematochezia 07/19/2023   • Left carotid stenosis 07/19/2023   • Colonic ischemia ([address]) 07/19/2023   • Injury of left facial nerve 03/17/2023   • Partial facial nerve palsy 02/01/2023   • Hepatic steatosis 12/19/2022   • Asymptomatic stenosis of left carotid artery 10/05/2022   • Stenosis of left carotid artery 09/06/2022   • Carotid artery stenosis 09/02/2022   • Appendix disease 04/29/2022   • Urinary retention 03/17/2022   • Peripheral vascular disease ([address]) 03/17/2022   • 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   • COPD (chronic obstructive pulmonary disease) ([address]) 08/19/2021   • Unspecified diastolic (congestive) heart failure ([address]) 03/12/2021   • Hypertensive heart disease with congestive heart failure ([address]) 12/21/2020   • CAMILLE (obstructive sleep apnea)    • Lumbar radiculopathy 10/01/2020   • Paroxysmal atrial fibrillation ([facility]) 03/19/2020   • Allergic conjunctivitis of both eyes 01/22/2020   • Angio-edema 01/22/2020   • Gastroesophageal reflux disease without esophagitis 08/12/2019   • Allergic reaction 12/03/2018   • AMD (age-related macular degeneration), bilateral 11/16/2018   • Angina pectoris ([address]) 11/15/2017   • Migraines 10/14/2016   • Essential hypertension 08/13/2015       Vitals: /80 (BP Location: Left arm)   Pulse 72   Temp (!) 96.1 °F (35.6 °C) (Temporal)   Resp 19   Ht 5' 4" (1.626 m)   Wt 87 kg (191 lb 12.8 oz)   LMP  (LMP Unknown)   SpO2 95%   BMI 32.92 kg/m²   PREVIOUS WEIGHTS:   Weight (last 2 days)     Date/Time Weight    07/25/23 0600 87 (191.8)          Wt Readings from Last 2 Encounters:   07/25/23 87 kg (191 lb 12.8 oz)   07/07/23 86.3 kg (190 lb 3.2 oz)       Labs/Data:        Results from last 7 days   Lab Units 07/25/23  0348 07/24/23 0212 07/23/23  0449   WBC Thousand/uL 8.20 8.79 9.76   HEMOGLOBIN g/dL 8.5* 9.3* 8.8*   HEMATOCRIT % 26.2* 29.0* 27.6*   MCV fL 93 92 93   MCH pg 30.0 29.4 29.6   MCHC g/dL 32.4 32.1 31.9   PLATELETS Thousands/uL 221 226 200     Results from last 7 days   Lab Units 07/25/23  0348 07/24/23  0212 07/23/23  0449   EGFR ml/min/1.73sq m 106 99 102   SODIUM mmol/L 138 138 139   POTASSIUM mmol/L 3.4* 3.0* 3.5   CHLORIDE mmol/L 100 101 106   CO2 mmol/L 30 28 26   BUN mg/dL 6 8 12   CREATININE mg/dL 0.44* 0.53* 0.49*     Results from last 7 days   Lab Units 07/25/23  0348 07/24/23  0212 07/23/23  0449 07/22/23  1723 07/19/23  0728 07/18/23  2244   CALCIUM mg/dL 8.3* 8.1* 7.5* 7.7*   < > 9.5   AST U/L  --   --   --   --   --  17   ALT U/L  --   --   --   --   --  14   ALK PHOS U/L  --   --   --   --   --  118*   MAGNESIUM mg/dL 1.8*  --  2.1 2.2   < >  --     < > = values in this interval not displayed.      Lab Results   Component Value Date    NTBNP 187 (H) 06/06/2022    NTBNP 258 (H) 05/03/2022     Lab Results   Component Value Date    CHOLESTEROL 220 (H) 05/23/2023    TRIG 257 (H) 05/23/2023    HDL 58 05/23/2023    LDLCALC 111 (H) 05/23/2023    NONHDL 94 04/19/2021    HGBA1C 5.5 05/23/2023       Results from last 7 days   Lab Units 07/21/23  0559 07/18/23  2244   INR  1.49* 1.11   PROTIME seconds 18.0* 14.4          Current Facility-Administered Medications:   •  acetaminophen (TYLENOL) tablet 650 mg, 650 mg, Oral, Q6H [address], Kishan Castano MD, 650 mg at 07/25/23 0553  •  aluminum-magnesium hydroxide-simethicone (MAALOX) oral suspension 30 mL, 30 mL, Oral, Q6H PRN, Kishan Castano MD  •  amiodarone tablet 200 mg, 200 mg, Oral, BID With Meals, Ji Aceves MD, 200 mg at 07/25/23 0825  •  calcium gluconate 2 g in sodium chloride 0.9% 100 mL (premix), 2 g, Intravenous, Once, Fredrick Sweeney MD  •  cefTRIAXone (ROCEPHIN) IVPB (premix in dextrose) 1,000 mg 50 mL, 1,000 mg, Intravenous, Q24H, Kishan Castano MD, Last Rate: 100 mL/hr at 07/25/23 0554, 1,000 mg at 07/25/23 0554  •  chlorhexidine (PERIDEX) 0.12 % oral rinse 15 mL, 15 mL, Mouth/Throat, Q12H [address], Kishan Castano MD, 15 mL at 07/25/23 0825  •  diltiazem (CARDIZEM) injection 10 mg, 10 mg, Intravenous, Q3H PRN, Murtaza Garnett PA-C, 10 mg at 07/24/23 1944  •  diltiazem (CARDIZEM) tablet 30 mg, 30 mg, Oral, Q6H [address], Ji Aceves MD, 30 mg at 07/24/23 2326  •  diphenhydrAMINE (BENADRYL) injection 25 mg, 25 mg, Intravenous, HS PRN, JUD Chavez  •  famotidine (PEPCID) tablet 20 mg, 20 mg, Oral, HS, Kishan Castano MD, 20 mg at 07/24/23 2135  •  fluticasone-vilanterol 200-25 mcg/actuation 1 puff, 1 puff, Inhalation, Daily, Kishan Castano MD, 1 puff at 07/25/23 0825  •  furosemide (LASIX) tablet 40 mg, 40 mg, Oral, Daily, Murtaza Garnett PA-C, 40 mg at 07/25/23 0825  •  heparin (porcine) 25,000 units in 0.45% NaCl 250 mL infusion (premix), 3-20 Units/kg/hr (Order-Specific), Intravenous, Titrated, Ritchie Daniel MD, Last Rate: 14.5 mL/hr at 07/25/23 0714, 17 Units/kg/hr at 07/25/23 0714  •  heparin (porcine) injection 2,000 Units, 2,000 Units, Intravenous, Q6H PRN, Ritchie Daniel MD, 2,000 Units at 07/23/23 0545  •  heparin (porcine) injection 4,000 Units, 4,000 Units, Intravenous, Q6H PRN, Ritchie Daniel MD, 4,000 Units at 07/24/23 0345  •  LORazepam (ATIVAN) tablet 1 mg, 1 mg, Oral, TID PRN, JUD Rizzo, 1 mg at 07/24/23 0404  •  methocarbamol (ROBAXIN) tablet 500 mg, 500 mg, Oral, Q6H [address], Ritchie Daniel MD, 500 mg at 07/25/23 0553  •  metoprolol (LOPRESSOR) injection 10 mg, 10 mg, Intravenous, Q6H PRN, Ritchie Daniel MD, 10 mg at 07/23/23 0329  •  metoprolol tartrate (LOPRESSOR) tablet 100 mg, 100 mg, Oral, Q12H [address], Ritchie Daniel MD, 100 mg at 07/25/23 0825  •  morphine injection 4 mg, 4 mg, Intravenous, Q2H PRN, JUD Rizzo, 4 mg at 07/24/23 2047  •  naloxone (NARCAN) 0.04 mg/mL syringe 0.04 mg, 0.04 mg, Intravenous, Q1MIN PRN, Beth Santos MD  •  ondansetron [facility]) injection 4 mg, 4 mg, Intravenous, Q6H PRN, Ritchie Daniel MD, 4 mg at 07/24/23 2326  •  oxyCODONE (ROXICODONE) immediate release tablet 10 mg, 10 mg, Oral, Q4H PRN, Beth Santos MD, 10 mg at 07/25/23 0000  •  oxyCODONE (ROXICODONE) IR tablet 5 mg, 5 mg, Oral, Q4H PRN, Beth Santos MD, 5 mg at 07/25/23 5351  •  potassium chloride (K-DUR,KLOR-CON) CR tablet 40 mEq, 40 mEq, Oral, Once, Beth Santos MD  •  potassium phosphates 30 mmol in sodium chloride 0.9 % 250 mL infusion, 30 mmol, Intravenous, Once, Beth Santos MD  •  sucralfate (CARAFATE) tablet 1 g, 1 g, Oral, 4x Daily (AC & HS), Ritchie Daniel MD, 1 g at 07/25/23 3077  Allergies   Allergen Reactions   • Ace Inhibitors Angioedema and Anaphylaxis     Anaphylaxis   • Benicar [Olmesartan] Angioedema     See Allergy note from 9/11/2008. Swollen ankles/legs   • Hydralazine Other (See Comments)     Lip swelling  Flank pain   • Other Anaphylaxis and Other (See Comments)     Other reaction(s): angioadema  Other reaction(s): Other (See Comments)  Preservatives- itching throat closes, hi  Other reaction(s): angioadema  E Z Cat - hives throat closes itching,  Preservatives- itching throat closes, hi  Artificial sweeteners   • Valsartan Angioedema     Lips, face swollen   • Sulfa Antibiotics Hives     stuffiness,itching,hives,throat closing--per pt "sometimes able to take depending on the brand and the filler or possibly preservatives in it"   • Ampicillin Hives     Depends on brand some preservatives can react. Has recently tolerated oral amoxicillin. • Motrin [Ibuprofen] Other (See Comments)     Per pt " told not to take due to A Fib"   • Wound Dressing Adhesive Other (See Comments)     Per pt Adhesive on EKG leads caused redness         Intake/Output Summary (Last 24 hours) at 7/25/2023 3104  Last data filed at 7/25/2023 0555  Gross per 24 hour   Intake 1000 ml   Output 1100 ml   Net -100 ml       Invasive Devices     Peripherally Inserted Central Catheter Line  Duration           PICC Line 09/17/78 Right Basilic <1 day          Peripheral Intravenous Line  Duration           Peripheral IV 07/24/23 Right;Ventral (anterior) Forearm 1 day          Drain  Duration           External Urinary Catheter 2 days                Review of Systems   Constitutional: Negative. HENT: Negative. Respiratory: Negative for chest tightness and shortness of breath. Cardiovascular: Negative for chest pain and leg swelling. Gastrointestinal: Negative. Endocrine: Negative. Genitourinary: Negative. Musculoskeletal: Negative. Skin: Negative. Allergic/Immunologic: Negative. Neurological: Negative. Hematological: Negative. Psychiatric/Behavioral: Negative.         Physical Exam  Constitutional:       General: She is not in acute distress. Appearance: She is well-developed. HENT:      Head: Normocephalic and atraumatic. Neck:      Thyroid: No thyromegaly. Vascular: No carotid bruit or JVD. Trachea: No tracheal deviation. Cardiovascular:      Rate and Rhythm: Normal rate. Rhythm irregularly irregular. Pulses: Normal pulses. Heart sounds: Normal heart sounds. No murmur heard. No friction rub. No gallop. Pulmonary:      Effort: Pulmonary effort is normal. No respiratory distress. Breath sounds: Normal breath sounds. No wheezing, rhonchi or rales. Chest:      Chest wall: No tenderness. Musculoskeletal:         General: Normal range of motion. Cervical back: Normal range of motion and neck supple. Right lower leg: No edema. Left lower leg: No edema. Skin:     General: Skin is warm and dry. Neurological:      General: No focal deficit present. Mental Status: She is alert and oriented to person, place, and time. Psychiatric:         Mood and Affect: Mood normal.         Behavior: Behavior normal.         Thought Content: Thought content normal.         Judgment: Judgment normal.         ======================================================     Imaging:   I have personally reviewed pertinent reports. EKG: Sinus rhythm with frequent PACs going in and out of atrial fibrillation with RVR      Portions of the record may have been created with voice recognition software. Occasional wrong word or "sound a like" substitutions may have occurred due to the inherent limitations of voice recognition software. Read the chart carefully and recognize, using context, where substitutions have occurred.

## 2023-07-25 NOTE — ASSESSMENT & PLAN NOTE
· Status post laparotomy, colon resection, anastomosis on 7/19/2023  · Intraoperatively, she was found to have ischemia of the transverse, distal and sigmoid colon  · Postop management, diet and pain control per primary service  · Repeat ct on 7/24/23 without bowel leak or suspicious collection

## 2023-07-25 NOTE — ASSESSMENT & PLAN NOTE
· Managed medically with aspirin and anticoagulation  · Back on eliquis and aspirin  Background:  · Pt was admitted 9/22 on the stroke pathway:  MRI negative and sx were attributed to migrane and discharged on ASA and eliquis  · She was diagnosed with left ICA stenosis 80% and underwent unsuccessful CEA with subsequent left facial numbness and left facial asymmetry  · Admitted 2/23 with left facial droop and BUE paresthesias with negative stroke work up that admission

## 2023-07-25 NOTE — PLAN OF CARE
Problem: PAIN - ADULT  Goal: Verbalizes/displays adequate comfort level or baseline comfort level  Description: Interventions:  - Encourage patient to monitor pain and request assistance  - Assess pain using appropriate pain scale  - Administer analgesics based on type and severity of pain and evaluate response  - Implement non-pharmacological measures as appropriate and evaluate response  - Consider cultural and social influences on pain and pain management  - Notify physician/advanced practitioner if interventions unsuccessful or patient reports new pain  Outcome: Progressing     Problem: INFECTION - ADULT  Goal: Absence or prevention of progression during hospitalization  Description: INTERVENTIONS:  - Assess and monitor for signs and symptoms of infection  - Monitor lab/diagnostic results  - Monitor all insertion sites, i.e. indwelling lines, tubes, and drains  - Monitor endotracheal if appropriate and nasal secretions for changes in amount and color  - [place] appropriate cooling/warming therapies per order  - Administer medications as ordered  - Instruct and encourage patient and family to use good hand hygiene technique  - Identify and instruct in appropriate isolation precautions for identified infection/condition  Outcome: Progressing  Goal: Absence of fever/infection during neutropenic period  Description: INTERVENTIONS:  - Monitor WBC    Outcome: Progressing     Problem: SAFETY ADULT  Goal: Patient will remain free of falls  Description: INTERVENTIONS:  - Educate patient/family on patient safety including physical limitations  - Instruct patient to call for assistance with activity   - Consult OT/PT to assist with strengthening/mobility   - Keep Call bell within reach  - Keep bed low and locked with side rails adjusted as appropriate  - Keep care items and personal belongings within reach  - Initiate and maintain comfort rounds  - Make Fall Risk Sign visible to staff  - Offer Toileting every 2 Hours, in advance of need  - Initiate/Maintain bed alarm  - Obtain necessary fall risk management equipment: bed alarm   - Apply yellow socks and bracelet for high fall risk patients  - Consider moving patient to room near nurses station  Outcome: Progressing  Goal: Maintain or return to baseline ADL function  Description: INTERVENTIONS:  -  Assess patient's ability to carry out ADLs; assess patient's baseline for ADL function and identify physical deficits which impact ability to perform ADLs (bathing, care of mouth/teeth, toileting, grooming, dressing, etc.)  - Assess/evaluate cause of self-care deficits   - Assess range of motion  - Assess patient's mobility; develop plan if impaired  - Assess patient's need for assistive devices and provide as appropriate  - Encourage maximum independence but intervene and supervise when necessary  - Involve family in performance of ADLs  - Assess for home care needs following discharge   - Consider OT consult to assist with ADL evaluation and planning for discharge  - Provide patient education as appropriate  Outcome: Progressing  Goal: Maintains/Returns to pre admission functional level  Description: INTERVENTIONS:  - Perform BMAT or MOVE assessment daily.   - Set and communicate daily mobility goal to care team and patient/family/caregiver. - Collaborate with rehabilitation services on mobility goals if consulted  - Perform Range of Motion 4 times a day. - Reposition patient every 2 hours.   - Dangle patient 4 times a day  - Stand patient 4 times a day  - Ambulate patient 4 times a day  - Out of bed to chair 4 times a day   - Out of bed for meals 4 times a day  - Out of bed for toileting  - Record patient progress and toleration of activity level   Outcome: Progressing     Problem: DISCHARGE PLANNING  Goal: Discharge to home or other facility with appropriate resources  Description: INTERVENTIONS:  - Identify barriers to discharge w/patient and caregiver  - Arrange for needed discharge resources and transportation as appropriate  - Identify discharge learning needs (meds, wound care, etc.)  - Arrange for interpretive services to assist at discharge as needed  - Refer to Case Management Department for coordinating discharge planning if the patient needs post-hospital services based on physician/advanced practitioner order or complex needs related to functional status, cognitive ability, or social support system  Outcome: Progressing     Problem: Knowledge Deficit  Goal: Patient/family/caregiver demonstrates understanding of disease process, treatment plan, medications, and discharge instructions  Description: Complete learning assessment and assess knowledge base.   Interventions:  - Provide teaching at level of understanding  - Provide teaching via preferred learning methods  Outcome: Progressing     Problem: Prexisting or High Potential for Compromised Skin Integrity  Goal: Skin integrity is maintained or improved  Description: INTERVENTIONS:  - Identify patients at risk for skin breakdown  - Assess and monitor skin integrity  - Assess and monitor nutrition and hydration status  - Monitor labs   - Assess for incontinence   - Turn and reposition patient  - Assist with mobility/ambulation  - Relieve pressure over bony prominences  - Avoid friction and shearing  - Provide appropriate hygiene as needed including keeping skin clean and dry  - Evaluate need for skin moisturizer/barrier cream  - Collaborate with interdisciplinary team   - Patient/family teaching  - Consider wound care consult   Outcome: Progressing     Problem: MOBILITY - ADULT  Goal: Maintain or return to baseline ADL function  Description: INTERVENTIONS:  -  Assess patient's ability to carry out ADLs; assess patient's baseline for ADL function and identify physical deficits which impact ability to perform ADLs (bathing, care of mouth/teeth, toileting, grooming, dressing, etc.)  - Assess/evaluate cause of self-care deficits - Assess range of motion  - Assess patient's mobility; develop plan if impaired  - Assess patient's need for assistive devices and provide as appropriate  - Encourage maximum independence but intervene and supervise when necessary  - Involve family in performance of ADLs  - Assess for home care needs following discharge   - Consider OT consult to assist with ADL evaluation and planning for discharge  - Provide patient education as appropriate  Outcome: Progressing  Goal: Maintains/Returns to pre admission functional level  Description: INTERVENTIONS:  - Perform BMAT or MOVE assessment daily.   - Set and communicate daily mobility goal to care team and patient/family/caregiver. - Collaborate with rehabilitation services on mobility goals if consulted  - Perform Range of Motion 4 times a day. - Reposition patient every 2 hours. - Dangle patient 4 times a day  - Stand patient 4 times a day  - Ambulate patient 4 times a day  - Out of bed to chair 4 times a day   - Out of bed for meals 4 times a day  - Out of bed for toileting  - Record patient progress and toleration of activity level   Outcome: Progressing     Problem: Nutrition/Hydration-ADULT  Goal: Nutrient/Hydration intake appropriate for improving, restoring or maintaining nutritional needs  Description: Monitor and assess patient's nutrition/hydration status for malnutrition. Collaborate with interdisciplinary team and initiate plan and interventions as ordered. Monitor patient's weight and dietary intake as ordered or per policy. Utilize nutrition screening tool and intervene as necessary. Determine patient's food preferences and provide high-protein, high-caloric foods as appropriate.      INTERVENTIONS:  - Monitor oral intake, urinary output, labs, and treatment plans  - Assess nutrition and hydration status and recommend course of action  - Evaluate amount of meals eaten  - Assist patient with eating if necessary   - Allow adequate time for meals  - Recommend/ encourage appropriate diets, oral nutritional supplements, and vitamin/mineral supplements  - Order, calculate, and assess calorie counts as needed  - Recommend, monitor, and adjust tube feedings and TPN/PPN based on assessed needs  - Assess need for intravenous fluids  - Provide specific nutrition/hydration education as appropriate  - Include patient/family/caregiver in decisions related to nutrition  Outcome: Progressing

## 2023-07-25 NOTE — PROGRESS NOTES
Progress Note - General Surgery   Blanka Fulton 70 y.o. female MRN: [id number]  Unit/Bed#: E4 -01 Encounter: [id number]    Assessment:  70 F w/ pmh of HTN, HLD, afib, CAD, COPD, SMA thrombosis s/p stent presents with abdominal pain, hematochezia and c/f ischemic colitis. Now s/p diagnostic lap converted to ex lap with extended left hemicolectomy and primary anastomosis 7/19    A-fib rate controlled in the 60s to 70s  Afebrile  Normotensive    UOP: 700+ unrecorded voids    7/24 CTAP with IV and p.o. contrast did not reveal any leak at the colonic anastomosis    WBC 8.2 from 8.7    Plan:  • Advance to low residual diet  • Continue heparin drip; transition to [address] today  • Cardiology recs appreciated  ? Transition from amiodarone drip to p.o. today  • SLIM recs appreciated  • Monitor electrolytes  • Discontinue antibiotics  • IS  • PT/OT recommending postacute rehab  • Continue telemetry  • Dispo planning per case management  • Please TigerText on call [facility] surgery resident or AP with any questions       Subjective/Objective     Subjective:   No acute events overnight. Reports pain has improved but does coincide with voiding. Some nausea no vomiting. Passing gas no bowel movements. Objective:     Blood pressure 125/80, pulse 72, temperature (!) 96.1 °F (35.6 °C), temperature source Temporal, resp. rate 19, height 5' 4" (1.626 m), weight 87 kg (191 lb 12.8 oz), SpO2 95 %, not currently breastfeeding. ,Body mass index is 32.92 kg/m².       Intake/Output Summary (Last 24 hours) at 7/25/2023 0917  Last data filed at 7/25/2023 0555  Gross per 24 hour   Intake 1000 ml   Output 1100 ml   Net -100 ml       Invasive Devices     Peripherally Inserted Central Catheter Line  Duration           PICC Line 19/16/58 Right Basilic <1 day          Peripheral Intravenous Line  Duration           Peripheral IV 07/24/23 Right;Ventral (anterior) Forearm 1 day          Drain  Duration           External Urinary Catheter 2 days General: NAD  Skin: Warm, dry, anicteric  HEENT: Normocephalic, atraumatic  CV: RRR  Pulm:  Nonlabored breathing on room air  Abd: Soft, appropriately tender, nondistended; incision clean dry and intact  MSK: Symmetric, no edema, no tenderness, no deformity  Neuro: AOx3, GCS 15

## 2023-07-25 NOTE — NURSING NOTE
Patient in the 110s in artrial fibrillation with intermittent tachycardia. Patient also having episodes of bradycardia in the 30s in normal sinus rhythm with intermittent bigeminy. Vital signs stables. SLIM made aware.      Luisa Bartholomew RN

## 2023-07-25 NOTE — ASSESSMENT & PLAN NOTE
· Followed by Neurology  · Suspected to be exacerbated by untreated CAMILLE:  Referred for sleep study   · Currently on furosemide for volume control

## 2023-07-25 NOTE — CASE MANAGEMENT
Case Management Discharge Planning Note    Patient name Alonzo Bocanegra  Location [address] [address] [id number] [place], [place] [address] [place] [address]-* MRN [id number]  : 1960 Date 2023       Current Admission Date: 2023  Current Admission Diagnosis:Colonic ischemia [facility])   Patient Active Problem List    Diagnosis Date Noted   • Acute postoperative pain 2023   • Colitis 2023   • Chronic migraine w/o aura w/o status migrainosus, not intractable 2023   • IIH (idiopathic intracranial hypertension) 2023   • Hematochezia 2023   • Left carotid stenosis 2023   • Colonic ischemia ([address]) 2023   • Injury of left facial nerve 2023   • Partial facial nerve palsy 2023   • Hepatic steatosis 2022   • Asymptomatic stenosis of left carotid artery 10/05/2022   • Stenosis of left carotid artery 2022   • Carotid artery stenosis 2022   • Appendix disease 2022   • Urinary retention 2022   • Peripheral vascular disease ([address]) 2022   • 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   • COPD (chronic obstructive pulmonary disease) ([address]) 2021   • Unspecified diastolic (congestive) heart failure ([address]) 2021   • Hypertensive heart disease with congestive heart failure ([address]) 2020   • CAMILLE (obstructive sleep apnea)    • Lumbar radiculopathy 10/01/2020   • Paroxysmal atrial fibrillation ([address]) 2020   • Allergic conjunctivitis of both eyes 2020   • Angio-edema 2020   • Gastroesophageal reflux disease without esophagitis 2019   • Allergic reaction 2018   • AMD (age-related macular degeneration), bilateral 2018   • Angina pectoris ([address]) 11/15/2017   • Migraines 10/14/2016   • Essential hypertension 2015      LOS (days): 6  Geometric Mean LOS (GMLOS) (days): 5.30  Days to GMLOS:-1     OBJECTIVE:  Risk of Unplanned Readmission Score: 28.88         Current admission status: Inpatient   Preferred Pharmacy:   [address] [place] [address] [address] - [place] - [address]  Phone: [phone] Fax: [phone]    [address], [address] [facility] [address] [address] [facility] [address]  Phone: [phone] Fax: 7790 [facility], 1801 [facility],  3700 [facility],  [address] [place] [address]  Phone: [phone] Fax: [phone]    Primary Care Provider: Andres Ruiz DO    Primary Insurance: [facility]  Secondary Insurance:     DISCHARGE DETAILS:    Discharge planning discussed with[de-identified] Patient  Freedom of Choice: Yes  Comments - Freedom of Choice: agreeable to go to [facility]  CM contacted family/caregiver?: No- see comments (declined at this time)  Were Treatment Team discharge recommendations reviewed with patient/caregiver?: Yes  Did patient/caregiver verbalize understanding of patient care needs?: N/A- going to facility  Were patient/caregiver advised of the risks associated with not following Treatment Team discharge recommendations?: Yes    Contacts  Patient Contacts: Patient  Relationship to Patient[de-identified] Family  Contact Method: In Person  Reason/Outcome: Continuity of Care, Discharge [address]         Is the patient interested in [address]  [facility] at discharge?: No    DME Referral Provided  Referral made for DME?: No              Treatment Team Recommendation: Short Term Rehab  Discharge Destination Plan[de-identified] Short Term Rehab                                         Additional Comments: CM met w/ pt at bedside to discuss d/c planning. CM read last CM's note over the weekend discussing how pt agreeable to rehab, but would like a facility in [facility] only accepting facilities are in [facility]. This CM let pt know there are no other accepting facilities other than the ones in [facility]. Pt agreeable to these facilities.  Pt denied any questions or concerns, just requested this CM keep pt in loop on when she is leaving [facility]. [address] [facility] know that pt is accepting bed & can start on one time contract since her insurance requires it. CM to continue to follow pt care & d/c.

## 2023-07-25 NOTE — UTILIZATION REVIEW
Continued Stay Review     Date: 7/25    POD # 6   lap converted to ex lap with extended left hemicolectomy and primary anastomosis 7/19                    Current Patient Class: IP  Current Level of Care: MS    HPI:63 y.o. female initially admitted on 7/19    Assessment/Plan:   POD #6 on low residue diet. Pt had PICC line inserted on 7/24. Intermittently tachycardic in A fib and also bradycardia. Tachybrady syndrome. On oxygen @6L NC. Low K, mag, calcium, hgb down to 8.3. Amiodarone and Heparin drips d/c today. Increasing Amiodarone to 400 mg BID, IV Lasix 40 mg BID, increase oral KCl dosing. PNow on DOAC. D/c IV antibiotics, continue tele. Has flatus but no BMs. Has pain with voiding. Will need post d/c rehab.       Vital Signs:   07/25/23 1144 96.9 °F (36.1 °C) Abnormal  156 Abnormal  18 118/56 80 95 % 44 -- 6 L/min Nasal cannula Lying   07/25/23 0826 -- -- -- -- -- 95 % -- -- -- None (Room air) --   07/25/23 0746 96.1 °F (35.6 °C) Abnormal  72 19 125/80 96 100 % 44 -- 6 L/min Nasal cannula Lying   07/25/23 0306 97.4 °F (36.3 °C) Abnormal  65 20 115/68 73 99 % -- -- -- -- Lying   07/24/23 2325 -- 78 -- 142/61 -- -- -- -- -- -- --   07/24/23 1944 -- 140 Abnormal  -- -- -- -- -- -- -- -- --   07/24/23 1941 96.3 °F (35.7 °C) Abnormal  62 20 150/66 95 100 % 44 -- 6 L/min Nasal cannula Lying   07/24/23 1504 96.4 °F (35.8 °C) Abnormal  100 20 140/90 104 98 % 44 -- 6 L/min Nasal cannula Lying   07/24/23 1115 95.5 °F (35.3 °C) Abnormal  71 18 111/56 74 98 % 44 -- 6 L/min Nasal cannula Sitting   07/24/23 0746 96.5 °F (35.8 °C) Abnormal  90 18 99/74 79 97 % 44 -- 6 L/min Nasal cannula Lying   07/24/23 0353 97.6 °F (36.4 °C) 104 16 155/104 Abnormal  122 -- -- -- -- -- Lying   07/23/23 2233 97.4 °F (36.3 °C) Abnormal  86 22 156/73 86 91 % 44 -- 6 L/min Nasal cannula Lying   07/23/23 1931 -- -- -- -- -- -- 36 -- 4 L/min Nasal cannula --   07/23/23 1922 96.5 °F (35.8 °C) Abnormal  130 Abnormal  20 149/93 98 94 % 36 -- 4 L/min Nasal cannula Lying   07/23/23 1624 96 °F (35.6 °C) Abnormal  149 Abnormal  20 126/94 107 93 % 36 4 L/min 4 L/min Mid flow nasal cannula Lying   07/23/23 1114 96.6 °F (35.9 °C) Abnormal  90 20 130/60 -- 94 % 36 -- 4 L/min Nasal cannula Lying   07/23/23 0739 96.5 °F (35.8 °C) Abnormal  145 Abnormal  20 112/61 -- 90 % 36 -- 4 L/min Nasal cannula Lying   07/23/23 0604 -- 153 Abnormal  -- 98/65 -- -- -- -- -- -- --   07/23/23 0452 -- -- -- 86/52 Abnormal  -- -- -- -- -- -- --   07/23/23 0438 -- 143 Abnormal  -- 89/49 Abnormal  -- -- -- -- -- -- --   07/23/23 0300 -- 155 Abnormal  -- 114/60 -- -- -- -- -- -- --   07/23/23 0211 96.2 °F (35.7 °C) Abnormal  162 Abnormal  20 109/66 80 94 % -- -- -- -- Lying     Pertinent Labs/Diagnostic Results:     7/24 CTAP - 1. Status post partial colonic resection. Eccentric wall thickening suggested at the remaining proximal colon adjacent to the staple lines may be related to postsurgical edema but can be reassessed on follow-up. No findings for bowel leak. No suspicious   collections. Scattered mild ascites. 2.  New small bilateral pleural effusions. Scattered subsegmental atelectasis at the bilateral lung bases      Results from last 7 days   Lab Units 07/25/23  0348 07/24/23  0212 07/23/23  0449 07/22/23  1723 07/22/23  0819 07/21/23  0559 07/20/23  0416   WBC Thousand/uL 8.20 8.79 9.76 11.12* 11.70*   < > 15.73*   HEMOGLOBIN g/dL 8.5* 9.3* 8.8* 9.4* 9.4*   < > 12.3   HEMATOCRIT % 26.2* 29.0* 27.6* 28.9* 29.0*   < > 37.4   PLATELETS Thousands/uL 221 226 200 189 173   < > 177   NEUTROS ABS Thousands/µL 5.50 6.21 6.99  --  9.50*  --   --    BANDS PCT %  --   --   --   --   --   --  31*    < > = values in this interval not displayed.          Results from last 7 days   Lab Units 07/25/23  0348 07/24/23  0212 07/23/23  0449 07/22/23  1723 07/22/23  0819 07/20/23  0416 07/19/23  2347   SODIUM mmol/L 138 138 139 139 139   < > 139   POTASSIUM mmol/L 3.4* 3.0* 3.5 2.9* 3.0* < > 3.6   CHLORIDE mmol/L 100 101 106 103 105   < > 111*   CO2 mmol/L 30 28 26 29 27   < > 18*   ANION GAP mmol/L 8 9 7 7 7   < > 10   BUN mg/dL 6 8 12 9 10   < > 13   CREATININE mg/dL 0.44* 0.53* 0.49* 0.47* 0.47*   < > 0.70   EGFR ml/min/1.73sq m 106 99 102 103 103   < > 91   CALCIUM mg/dL 8.3* 8.1* 7.5* 7.7* 8.0*   < > 7.5*   CALCIUM, IONIZED mmol/L  --   --  1.01*  --   --   --   --    MAGNESIUM mg/dL 1.8*  --  2.1 2.2 2.3  --  1.7*   PHOSPHORUS mg/dL 3.0 2.5 1.9* 1.5* 1.5*  --  2.1*    < > = values in this interval not displayed. Results from last 7 days   Lab Units 07/18/23  2244   AST U/L 17   ALT U/L 14   ALK PHOS U/L 118*   TOTAL PROTEIN g/dL 7.7   ALBUMIN g/dL 4.7   TOTAL BILIRUBIN mg/dL 0.47     Results from last 7 days   Lab Units 07/23/23  0513   POC GLUCOSE mg/dl 77     Results from last 7 days   Lab Units 07/25/23  0348 07/24/23  0212 07/23/23  0449 07/22/23  1723 07/22/23  0819 07/20/23  0416 07/19/23  2347 07/19/23  0728 07/18/23  2244   GLUCOSE RANDOM mg/dL 99 95 95 97 93 171* 177* 185* 149*     Results from last 7 days   Lab Units 07/19/23  2347   PH ART  7.327*   PCO2 ART mm Hg 31.6*   PO2 ART mm Hg 84.2   HCO3 ART mmol/L 16.2*   BASE EXC ART mmol/L -8.6   O2 CONTENT ART mL/dL 18.3   O2 HGB, ARTERIAL % 93.8*   ABG SOURCE  Line, Arterial     Results from last 7 days   Lab Units 07/22/23  1723   PH STANLEY  7.423*   PCO2 STANLEY mm Hg 44.8   PO2 STANLEY mm Hg 31.4*   HCO3 STANLEY mmol/L 28.6   BASE EXC STANLEY mmol/L 3.7   O2 CONTENT STANLEY ml/dL 8.2   O2 HGB, VENOUS % 57.8*     Results from last 7 days   Lab Units 07/25/23  0348 07/24/23  1800 07/24/23  0212 07/21/23  1319 07/21/23  0559 07/18/23  2244   PROTIME seconds  --   --   --   --  18.0* 14.4   INR   --   --   --   --  1.49* 1.11   PTT seconds >210* 165* 32   < > 34 33    < > = values in this interval not displayed.          Results from last 7 days   Lab Units 07/19/23  0728   PROCALCITONIN ng/ml 0.42*     Results from last 7 days   Lab Units 07/20/23  0318 07/19/23  2347 07/19/23 2011 07/19/23  1512 07/19/23  1054   LACTIC ACID mmol/L 1.7 2.8* 1.4 3.0* 4.7*             Results from last 7 days   Lab Units 07/22/23  0819   BNP pg/mL 508*     Results from last 7 days   Lab Units 07/18/23  2243   CLARITY UA  Clear   COLOR UA  Dark Yellow   SPEC GRAV UA  1.022   PH UA  5.5   GLUCOSE UA mg/dl Negative   KETONES UA mg/dl Negative   BLOOD UA  Negative   PROTEIN UA mg/dl 30 (1+)*   NITRITE UA  Negative   BILIRUBIN UA  Negative   UROBILINOGEN UA (BE) mg/dl <2.0   LEUKOCYTES UA  Negative   WBC UA /hpf 1-2   RBC UA /hpf 1-2   BACTERIA UA /hpf Occasional   EPITHELIAL CELLS WET PREP /hpf Occasional   MUCUS THREADS  Innumerable*         Medications:   Scheduled Medications:  acetaminophen, 650 mg, Oral, Q6H [address]  amiodarone, 400 mg, Oral, BID With Meals  apixaban, 5 mg, Oral, BID  aspirin, 81 mg, Oral, Daily  calcium gluconate, 2 g, Intravenous, Once  chlorhexidine, 15 mL, Mouth/Throat, Q12H BONIFACIO  diltiazem, 30 mg, Oral, Q6H BONIFACIO  famotidine, 20 mg, Oral, HS  fluticasone-vilanterol, 1 puff, Inhalation, Daily  furosemide, 40 mg, Intravenous, BID (diuretic)  methocarbamol, 500 mg, Oral, Q6H BONIFACIO  metoprolol tartrate, 100 mg, Oral, Q12H BONIFACIO  potassium chloride, 40 mEq, Oral, BID  sucralfate, 1 g, Oral, 4x Daily (AC & HS)      Continuous IV Infusions:    Amiodarone and Heparin drips d/c today 7/25     PRN Meds:  aluminum-magnesium hydroxide-simethicone, 30 mL, Oral, Q6H PRN  diltiazem, 10 mg, Intravenous, Q3H PRN - x 2 7/24  diphenhydrAMINE, 25 mg, Intravenous, HS PRN  LORazepam, 1 mg, Oral, TID PRN - x 1 7/24  metoprolol, 10 mg, Intravenous, Q6H PRN  morphine injection, 4 mg, Intravenous, Q2H PRN - x 2 7/24  naloxone, 0.04 mg, Intravenous, Q1MIN PRN  ondansetron, 4 mg, Intravenous, Q6H PRN - x 3 7/24, x 1 7/25  oxyCODONE, 10 mg, Oral, Q4H PRN  oxyCODONE, 5 mg, Oral, Q4H PRN    Discharge Plan: [facility] Utilization Review Department  ATTENTION: Please call with any questions or concerns to [phone] and carefully listen to the prompts so that you are directed to the right person. All voicemails are confidential.  Lesvia Abel all requests for admission clinical reviews, approved or denied determinations and any other requests to dedicated fax number below belonging to the campus where the patient is receiving treatment.  List of dedicated fax numbers for the Facilities:  [place] DENIALS (Administrative/Medical Necessity) [phone] 230[address] [facility] (Maternity/NICU/Pediatrics) [phone]   [address] [place] [address] [phone]   [facility] 1000 [facility] [phone]   [address] [place] [address] 207 [facility] [address] [phone]   [address] [facility] [address] [phone]

## 2023-07-25 NOTE — PROGRESS NOTES
[facility]  Progress Note  Name: Jenna Maloney  MRN: [id number]  Unit/Bed#: E4 -01 I Date of Admission: 7/18/2023   Date of Service: 7/25/2023 I Hospital Day: 6    Assessment/Plan   * Colonic ischemia [facility])  Assessment & Plan  · Status post laparotomy, colon resection, anastomosis on 7/19/2023  · Intraoperatively, she was found to have ischemia of the transverse, distal and sigmoid colon  · Postop management, diet and pain control per primary service  · Repeat ct on 7/24/23 without bowel leak or suspicious collection    Paroxysmal atrial fibrillation ([address])  Assessment & Plan  • Due to persistent RVR he was placed on amiodarone by cardiology  • Now back on eliquis  • Amiodarone dose increased by cardiology  • Rate is better now    COPD (chronic obstructive pulmonary disease) ([address])  Assessment & Plan  Stable without exacrebation  Continue Breo 1 puff daily  Continue albuterol as needed    Essential hypertension  Assessment & Plan  · Continue metoprolol and cardizem with hold parameters    Left carotid stenosis  Assessment & Plan  · Managed medically with aspirin and anticoagulation  · Back on eliquis and aspirin  Background:  · Pt was admitted 9/22 on the stroke pathway:  MRI negative and sx were attributed to migrane and discharged on ASA and eliquis  · She was diagnosed with left ICA stenosis 80% and underwent unsuccessful CEA with subsequent left facial numbness and left facial asymmetry  · Admitted 2/23 with left facial droop and BUE paresthesias with negative stroke work up that admission      IIH (idiopathic intracranial hypertension)  Assessment & Plan  · Followed by Neurology  · Suspected to be exacerbated by untreated CAMILLE:  Referred for sleep study   · Currently on furosemide for volume control           VTE Pharmacologic Prophylaxis: VTE Score: 3 Moderate Risk (Score 3-4) - Pharmacological DVT Prophylaxis Ordered: apixaban (Eliquis).     Patient Centered Rounds: I performed bedside rounds with nursing staff today. Current Length of Stay: 6 day(s)  Current Patient Status: Inpatient   Certification Statement: post op  Discharge Plan: rehab    Code Status: Level 1 - Full Code    Subjective:   + gas but no bm still  Abdominal pain is present but not as bad  Sometime "just couldn't get comfortable"  Admits she tends to "think too much"    Objective:     Vitals:   Temp (24hrs), Av.7 °F (35.9 °C), Min:96.1 °F (35.6 °C), Max:97.4 °F (36.3 °C)    Temp:  [96.1 °F (35.6 °C)-97.4 °F (36.3 °C)] 96.9 °F (36.1 °C)  HR:  [] 171  Resp:  [18-20] 18  BP: (115-150)/(56-80) 133/63  SpO2:  [94 %-100 %] 94 %  Body mass index is 32.92 kg/m². Input and Output Summary (last 24 hours): Intake/Output Summary (Last 24 hours) at 2023 1815  Last data filed at 2023 1601  Gross per 24 hour   Intake --   Output 2100 ml   Net -2100 ml       Physical Exam:   Physical Exam  Vitals reviewed. Constitutional:       Appearance: She is not ill-appearing. HENT:      Head: Normocephalic and atraumatic. Nose: No rhinorrhea. Eyes:      General: No scleral icterus. Cardiovascular:      Rate and Rhythm: Normal rate. Rhythm irregular. Pulmonary:      Breath sounds: No wheezing or rhonchi. Abdominal:      Palpations: Abdomen is soft. Comments: + bowel sounds   Musculoskeletal:      Cervical back: Neck supple. Right lower leg: No edema. Left lower leg: No edema. Skin:     General: Skin is warm and dry. Coloration: Skin is not jaundiced or pale. Neurological:      Mental Status: She is oriented to person, place, and time.    Psychiatric:         Behavior: Behavior normal.        Additional Data:     Labs:  Results from last 7 days   Lab Units 23  0348 23  0559 23  0416   WBC Thousand/uL 8.20   < > 15.73*   HEMOGLOBIN g/dL 8.5*   < > 12.3   HEMATOCRIT % 26.2*   < > 37.4   PLATELETS Thousands/uL 221   < > 177   BANDS PCT %  --   --  31*   NEUTROS PCT % 66   < >  --    LYMPHS PCT % 16   < >  --    LYMPHO PCT %  --   --  4*   MONOS PCT % 9   < >  --    MONO PCT %  --   --  0*   EOS PCT % 6   < > 0    < > = values in this interval not displayed. Results from last 7 days   Lab Units 07/25/23  0348 07/19/23  0728 07/18/23  2244   SODIUM mmol/L 138   < > 138   POTASSIUM mmol/L 3.4*   < > 3.3*   CHLORIDE mmol/L 100   < > 105   CO2 mmol/L 30   < > 19*   BUN mg/dL 6   < > 18   CREATININE mg/dL 0.44*   < > 0.99   ANION GAP mmol/L 8   < > 14   CALCIUM mg/dL 8.3*   < > 9.5   ALBUMIN g/dL  --   --  4.7   TOTAL BILIRUBIN mg/dL  --   --  0.47   ALK PHOS U/L  --   --  118*   ALT U/L  --   --  14   AST U/L  --   --  17   GLUCOSE RANDOM mg/dL 99   < > 149*    < > = values in this interval not displayed. Results from last 7 days   Lab Units 07/21/23  0559   INR  1.49*     Results from last 7 days   Lab Units 07/23/23  0513   POC GLUCOSE mg/dl 77         Results from last 7 days   Lab Units 07/20/23  0318 07/19/23  2347 07/19/23 2011 07/19/23  1512 07/19/23  1054 07/19/23  0728   LACTIC ACID mmol/L 1.7 2.8* 1.4 3.0*   < > 4.0*   PROCALCITONIN ng/ml  --   --   --   --   --  0.42*    < > = values in this interval not displayed.        Lines/Drains:  Invasive Devices     Peripherally Inserted Central Catheter Line  Duration           PICC Line 30/82/75 Right Basilic 1 day          Peripheral Intravenous Line  Duration           Peripheral IV 07/24/23 Right;Ventral (anterior) Forearm 1 day          Drain  Duration           External Urinary Catheter 2 days                C         Telemetry:  Telemetry Orders (From admission, onward)             Telemetry Monitoring  Continuous x 24 Hours (Telem)        Question:  Reason for 24 Hour Telemetry  Answer:  Arrhythmias requiring acute medical intervention / PPM or ICD malfunction                 Telemetry Reviewed: afib               Imaging: Reviewed radiology reports from this admission including: abdominal/pelvic CT    Recent Cultures (last 7 days):         Last 24 Hours Medication List:   Current Facility-Administered Medications   Medication Dose Route Frequency Provider Last Rate   • acetaminophen  650 mg Oral Q6H [facility] Manfred Roger MD     • aluminum-magnesium hydroxide-simethicone  30 mL Oral Q6H PRN Manfred Roger MD     • amiodarone  400 mg Oral BID With Meals Luiz Rojas MD     • apixaban  5 mg Oral BID Juliocesar Tarango MD     • aspirin  81 mg Oral Daily Juliocesar Tarango MD     • calcium gluconate  2 g Intravenous Once Juliocesar Tarango MD     • chlorhexidine  15 mL Mouth/Throat Q12H [facility] Manfred Roger MD     • diltiazem  10 mg Intravenous Q3H PRN Harjeet Garnett PA-C     • diltiazem  30 mg Oral Q6H [facility] Ji Aceves MD     • diphenhydrAMINE  25 mg Intravenous HS PRN JUD Hernandez     • famotidine  20 mg Oral HS Manfred Roger MD     • fluticasone-vilanterol  1 puff Inhalation Daily Manfred Roger MD     • furosemide  40 mg Intravenous BID (diuretic) Luiz Rojas MD     • LORazepam  1 mg Oral TID PRN JUD Hernandez     • methocarbamol  500 mg Oral Q6H [facility] Manfred Roger MD     • metoprolol  10 mg Intravenous Q6H PRN Manfred Roger MD     • metoprolol tartrate  100 mg Oral Q12H [facility] Manfred Roger MD     • morphine injection  4 mg Intravenous Q2H PRN JUD Hernandez     • naloxone  0.04 mg Intravenous Q1MIN PRN Juliocesar Tarango MD     • ondansetron  4 mg Intravenous Q6H PRN Manfred Roger MD     • oxyCODONE  10 mg Oral Q4H PRN Juliocesar Tarango MD     • oxyCODONE  5 mg Oral Q4H PRN Juliocesar Tarango MD     • potassium chloride  40 mEq Oral BID Luiz Rojas MD     • sucralfate  1 g Oral 4x Daily [facility] ) Manfred Roger MD          Today, Patient Was Seen By: Loco Ayoub MD    **Please Note: This note may have been constructed using a voice recognition system. **

## 2023-07-26 LAB
ANION GAP SERPL CALCULATED.3IONS-SCNC: 4 MMOL/L
BASOPHILS # BLD MANUAL: 0 THOUSAND/UL (ref 0–0.1)
BASOPHILS NFR MAR MANUAL: 0 % (ref 0–1)
BUN SERPL-MCNC: 9 MG/DL (ref 5–25)
CALCIUM SERPL-MCNC: 8.4 MG/DL (ref 8.4–10.2)
CHLORIDE SERPL-SCNC: 101 MMOL/L (ref 96–108)
CO2 SERPL-SCNC: 32 MMOL/L (ref 21–32)
CREAT SERPL-MCNC: 0.59 MG/DL (ref 0.6–1.3)
EOSINOPHIL # BLD MANUAL: 0.34 THOUSAND/UL (ref 0–0.4)
EOSINOPHIL NFR BLD MANUAL: 4 % (ref 0–6)
ERYTHROCYTE [DISTWIDTH] IN BLOOD BY AUTOMATED COUNT: 13.6 % (ref 11.6–15.1)
GFR SERPL CREATININE-BSD FRML MDRD: 96 ML/MIN/1.73SQ M
GLUCOSE SERPL-MCNC: 95 MG/DL (ref 65–140)
HCT VFR BLD AUTO: 25.9 % (ref 34.8–46.1)
HGB BLD-MCNC: 8.2 G/DL (ref 11.5–15.4)
LG PLATELETS BLD QL SMEAR: PRESENT
LYMPHOCYTES # BLD AUTO: 1.68 THOUSAND/UL (ref 0.6–4.47)
LYMPHOCYTES # BLD AUTO: 20 % (ref 14–44)
MAGNESIUM SERPL-MCNC: 2 MG/DL (ref 1.9–2.7)
MCH RBC QN AUTO: 29.7 PG (ref 26.8–34.3)
MCHC RBC AUTO-ENTMCNC: 31.7 G/DL (ref 31.4–37.4)
MCV RBC AUTO: 94 FL (ref 82–98)
MONOCYTES # BLD AUTO: 0.5 THOUSAND/UL (ref 0–1.22)
MONOCYTES NFR BLD: 6 % (ref 4–12)
MYELOCYTES NFR BLD MANUAL: 1 % (ref 0–1)
NEUTROPHILS # BLD MANUAL: 5.79 THOUSAND/UL (ref 1.85–7.62)
NEUTS BAND NFR BLD MANUAL: 1 % (ref 0–8)
NEUTS SEG NFR BLD AUTO: 68 % (ref 43–75)
OVALOCYTES BLD QL SMEAR: PRESENT
PHOSPHATE SERPL-MCNC: 2.2 MG/DL (ref 2.3–4.1)
PLATELET # BLD AUTO: 267 THOUSANDS/UL (ref 149–390)
PLATELET BLD QL SMEAR: ADEQUATE
PMV BLD AUTO: 10 FL (ref 8.9–12.7)
POLYCHROMASIA BLD QL SMEAR: PRESENT
POTASSIUM SERPL-SCNC: 4.3 MMOL/L (ref 3.5–5.3)
RBC # BLD AUTO: 2.76 MILLION/UL (ref 3.81–5.12)
RBC MORPH BLD: NORMAL
SODIUM SERPL-SCNC: 137 MMOL/L (ref 135–147)
WBC # BLD AUTO: 8.39 THOUSAND/UL (ref 4.31–10.16)

## 2023-07-26 PROCEDURE — 99232 SBSQ HOSP IP/OBS MODERATE 35: CPT | Performed by: INTERNAL MEDICINE

## 2023-07-26 PROCEDURE — 84100 ASSAY OF PHOSPHORUS: CPT | Performed by: SURGERY

## 2023-07-26 PROCEDURE — 85007 BL SMEAR W/DIFF WBC COUNT: CPT | Performed by: SURGERY

## 2023-07-26 PROCEDURE — 85027 COMPLETE CBC AUTOMATED: CPT | Performed by: SURGERY

## 2023-07-26 PROCEDURE — 80048 BASIC METABOLIC PNL TOTAL CA: CPT | Performed by: INTERNAL MEDICINE

## 2023-07-26 PROCEDURE — 83735 ASSAY OF MAGNESIUM: CPT | Performed by: SURGERY

## 2023-07-26 PROCEDURE — 99231 SBSQ HOSP IP/OBS SF/LOW 25: CPT | Performed by: INTERNAL MEDICINE

## 2023-07-26 PROCEDURE — 99024 POSTOP FOLLOW-UP VISIT: CPT | Performed by: SURGERY

## 2023-07-26 RX ORDER — FUROSEMIDE 40 MG/1
40 TABLET ORAL
Status: DISCONTINUED | OUTPATIENT
Start: 2023-07-26 | End: 2023-07-28 | Stop reason: HOSPADM

## 2023-07-26 RX ORDER — POTASSIUM CHLORIDE 20 MEQ/1
20 TABLET, EXTENDED RELEASE ORAL 2 TIMES DAILY
Status: DISCONTINUED | OUTPATIENT
Start: 2023-07-26 | End: 2023-07-27

## 2023-07-26 RX ORDER — DILTIAZEM HYDROCHLORIDE 120 MG/1
120 CAPSULE, COATED, EXTENDED RELEASE ORAL DAILY
Status: DISCONTINUED | OUTPATIENT
Start: 2023-07-27 | End: 2023-07-28 | Stop reason: HOSPADM

## 2023-07-26 RX ADMIN — ASPIRIN 81 MG: 81 TABLET, COATED ORAL at 08:55

## 2023-07-26 RX ADMIN — AMIODARONE HYDROCHLORIDE 400 MG: 200 TABLET ORAL at 17:07

## 2023-07-26 RX ADMIN — DILTIAZEM HYDROCHLORIDE 30 MG: 30 TABLET, FILM COATED ORAL at 11:51

## 2023-07-26 RX ADMIN — DILTIAZEM HYDROCHLORIDE 30 MG: 30 TABLET, FILM COATED ORAL at 00:23

## 2023-07-26 RX ADMIN — METOPROLOL TARTRATE 100 MG: 100 TABLET, FILM COATED ORAL at 08:55

## 2023-07-26 RX ADMIN — ACETAMINOPHEN 325MG 650 MG: 325 TABLET ORAL at 05:26

## 2023-07-26 RX ADMIN — METHOCARBAMOL 500 MG: 500 TABLET ORAL at 23:52

## 2023-07-26 RX ADMIN — METOPROLOL TARTRATE 100 MG: 100 TABLET, FILM COATED ORAL at 21:15

## 2023-07-26 RX ADMIN — METHOCARBAMOL 500 MG: 500 TABLET ORAL at 00:23

## 2023-07-26 RX ADMIN — CHLORHEXIDINE GLUCONATE 15 ML: 1.2 RINSE ORAL at 08:55

## 2023-07-26 RX ADMIN — METHOCARBAMOL 500 MG: 500 TABLET ORAL at 11:46

## 2023-07-26 RX ADMIN — ACETAMINOPHEN 325MG 650 MG: 325 TABLET ORAL at 00:23

## 2023-07-26 RX ADMIN — POTASSIUM PHOSPHATE, MONOBASIC POTASSIUM PHOSPHATE, DIBASIC 12 MMOL: 224; 236 INJECTION, SOLUTION, CONCENTRATE INTRAVENOUS at 12:46

## 2023-07-26 RX ADMIN — ACETAMINOPHEN 325MG 650 MG: 325 TABLET ORAL at 23:52

## 2023-07-26 RX ADMIN — OXYCODONE HYDROCHLORIDE 5 MG: 5 TABLET ORAL at 22:38

## 2023-07-26 RX ADMIN — CHLORHEXIDINE GLUCONATE 15 ML: 1.2 RINSE ORAL at 21:16

## 2023-07-26 RX ADMIN — AMIODARONE HYDROCHLORIDE 400 MG: 200 TABLET ORAL at 08:55

## 2023-07-26 RX ADMIN — LORAZEPAM 1 MG: 1 TABLET ORAL at 11:56

## 2023-07-26 RX ADMIN — SUCRALFATE 1 G: 1 TABLET ORAL at 11:46

## 2023-07-26 RX ADMIN — FUROSEMIDE 40 MG: 40 TABLET ORAL at 17:07

## 2023-07-26 RX ADMIN — OXYCODONE HYDROCHLORIDE 10 MG: 10 TABLET ORAL at 13:02

## 2023-07-26 RX ADMIN — DILTIAZEM HYDROCHLORIDE 30 MG: 30 TABLET, FILM COATED ORAL at 17:06

## 2023-07-26 RX ADMIN — DILTIAZEM HYDROCHLORIDE 30 MG: 30 TABLET, FILM COATED ORAL at 05:26

## 2023-07-26 RX ADMIN — METHOCARBAMOL 500 MG: 500 TABLET ORAL at 17:06

## 2023-07-26 RX ADMIN — ACETAMINOPHEN 325MG 650 MG: 325 TABLET ORAL at 17:06

## 2023-07-26 RX ADMIN — POTASSIUM CHLORIDE 40 MEQ: 1500 TABLET, EXTENDED RELEASE ORAL at 08:55

## 2023-07-26 RX ADMIN — APIXABAN 5 MG: 5 TABLET, FILM COATED ORAL at 17:06

## 2023-07-26 RX ADMIN — FUROSEMIDE 40 MG: 10 INJECTION, SOLUTION INTRAVENOUS at 08:55

## 2023-07-26 RX ADMIN — APIXABAN 5 MG: 5 TABLET, FILM COATED ORAL at 08:55

## 2023-07-26 RX ADMIN — METHOCARBAMOL 500 MG: 500 TABLET ORAL at 05:26

## 2023-07-26 RX ADMIN — SUCRALFATE 1 G: 1 TABLET ORAL at 05:26

## 2023-07-26 RX ADMIN — ONDANSETRON 4 MG: 2 INJECTION INTRAMUSCULAR; INTRAVENOUS at 11:56

## 2023-07-26 RX ADMIN — OXYCODONE HYDROCHLORIDE 5 MG: 5 TABLET ORAL at 00:22

## 2023-07-26 RX ADMIN — ACETAMINOPHEN 325MG 650 MG: 325 TABLET ORAL at 11:46

## 2023-07-26 RX ADMIN — FAMOTIDINE 20 MG: 20 TABLET ORAL at 21:15

## 2023-07-26 RX ADMIN — SUCRALFATE 1 G: 1 TABLET ORAL at 21:15

## 2023-07-26 RX ADMIN — DILTIAZEM HYDROCHLORIDE 30 MG: 30 TABLET, FILM COATED ORAL at 23:52

## 2023-07-26 RX ADMIN — FLUTICASONE FUROATE AND VILANTEROL TRIFENATATE 1 PUFF: 200; 25 POWDER RESPIRATORY (INHALATION) at 08:59

## 2023-07-26 RX ADMIN — SUCRALFATE 1 G: 1 TABLET ORAL at 17:06

## 2023-07-26 NOTE — ASSESSMENT & PLAN NOTE
· Followed by Neurology  · Suspected to be exacerbated by untreated CAMILLE:  Referred for sleep study   · Currently on furosemide , stable

## 2023-07-26 NOTE — PHYSICAL THERAPY NOTE
PHYSICAL THERAPY NOTE          Patient Name: Kamron Sarmiento  [id number] Date: 7/26/2023 07/26/23 1255   PT Last Visit   PT Visit Date 07/26/23   Note Type   Note Type Cancelled Session  (Attempted PT tx however pt requested to defer 2* to inc back pain & nausea. Pt requested to take her pain meds first then can check back if better. Attempted again at a later time but pt now back in bed. Pt requested to defer PT tx at this tme as she is)   Cancel Reasons Refusal  (still not feeling well. RN notified. RN reported that pt has been up & ambulating back & forth to the bathroom due to Lasix hence possibly pt now fatigued.  Will continue to follow as appropriate.)     Kiah Smoker

## 2023-07-26 NOTE — PROGRESS NOTES
[facility]  Progress Note  Name: Tabatha Parikh  MRN: [id number]  Unit/Bed#: E4 -01 I Date of Admission: 7/18/2023   Date of Service: 7/26/2023 I Hospital Day: 7    Assessment/Plan   * Colonic ischemia [facility])  Assessment & Plan  · Status post laparotomy, colon resection, anastomosis on 7/19/2023  · Intraoperatively, she was found to have ischemia of the transverse, distal and sigmoid colon  · Repeat ct on 7/24/23 without bowel leak or suspicious collection  · Postop management, diet, pain control per primary service    Paroxysmal atrial fibrillation ([address])  Assessment & Plan  • Now in SR with controlled rate   • Contoinue eliquis, amiodarone and cardizem per cardiology    COPD (chronic obstructive pulmonary disease) ([address])  Assessment & Plan  Stable without exacerbation  Continue Breo 1 puff daily  Continue albuterol as needed  Ambulate as tolerated    Essential hypertension  Assessment & Plan  · Currently tolerating combination diltiazem and metoprolol. Left carotid stenosis  Assessment & Plan  · Managed medically with aspirin and anticoagulation  · Continue Eliquis and aspirin  Background:  · Pt was admitted 9/22 on the stroke pathway:  MRI negative and sx were attributed to migrane and discharged on ASA and eliquis  · She was diagnosed with left ICA stenosis 80% and underwent unsuccessful CEA with subsequent left facial numbness and left facial asymmetry  · Admitted 2/23 with left facial droop and BUE paresthesias with negative stroke work up that admission      IIH (idiopathic intracranial hypertension)  Assessment & Plan  · Followed by Neurology  · Suspected to be exacerbated by untreated CAMILLE:  Referred for sleep study   · Currently on furosemide , stable          She is stable. Management mostly being done by primary service and cardiology.    Medicine will follow peripherally   No objections to rehab when cleared by primary service and cardiology      VTE Pharmacologic Prophylaxis: VTE Score: 3 Moderate Risk (Score 3-4) - Pharmacological DVT Prophylaxis Ordered: apixaban (Eliquis). Discussions with Specialists or Other Care Team Provider: Discussed with surgery    Current Length of Stay: 7 day(s)  Current Patient Status: Inpatient   Certification Statement: The patient will continue to require additional inpatient hospital stay due to Postop state  Discharge Plan: Short-term rehab    Code Status: Level 1 - Full Code    Subjective:   Seen and examined during rounds. Pain improved significantly  Swelling improved significantly  Denies shortness of breath  She is passing gas but no solid bowel movement yet. She is agreeable to go to rehab    Objective:     Vitals:   Temp (24hrs), Av.2 °F (36.2 °C), Min:96.5 °F (35.8 °C), Max:98.2 °F (36.8 °C)    Temp:  [96.5 °F (35.8 °C)-98.2 °F (36.8 °C)] 96.8 °F (36 °C)  HR:  [77-86] 77  Resp:  [20] 20  BP: ()/(58-90) 156/71  SpO2:  [91 %-96 %] 94 %  Body mass index is 33.64 kg/m². Input and Output Summary (last 24 hours): Intake/Output Summary (Last 24 hours) at 2023 1754  Last data filed at 2023 2940  Gross per 24 hour   Intake --   Output 500 ml   Net -500 ml       Physical Exam:   Physical Exam  Vitals reviewed. Constitutional:       Appearance: She is not ill-appearing. HENT:      Head: Normocephalic and atraumatic. Nose: No congestion or rhinorrhea. Eyes:      General: No scleral icterus. Cardiovascular:      Rate and Rhythm: Regular rhythm. Pulmonary:      Breath sounds: No wheezing, rhonchi or rales. Abdominal:      Palpations: Abdomen is soft. Comments: Positive bowel sounds   Musculoskeletal:      Right lower leg: No edema. Left lower leg: No edema. Skin:     General: Skin is warm and dry. Coloration: Skin is not jaundiced or pale. Neurological:      Mental Status: She is oriented to person, place, and time.    Psychiatric:         Mood and Affect: Mood normal. Behavior: Behavior normal.      Additional Data:     Labs:  Results from last 7 days   Lab Units 07/26/23  0524 07/25/23  0348   WBC Thousand/uL 8.39 8.20   HEMOGLOBIN g/dL 8.2* 8.5*   HEMATOCRIT % 25.9* 26.2*   PLATELETS Thousands/uL 267 221   BANDS PCT % 1  --    NEUTROS PCT %  --  66   LYMPHS PCT %  --  16   LYMPHO PCT % 20  --    MONOS PCT %  --  9   MONO PCT % 6  --    EOS PCT % 4 6     Results from last 7 days   Lab Units 07/26/23  0524   SODIUM mmol/L 137   POTASSIUM mmol/L 4.3   CHLORIDE mmol/L 101   CO2 mmol/L 32   BUN mg/dL 9   CREATININE mg/dL 0.59*   ANION GAP mmol/L 4   CALCIUM mg/dL 8.4   GLUCOSE RANDOM mg/dL 95     Results from last 7 days   Lab Units 07/21/23  0559   INR  1.49*     Results from last 7 days   Lab Units 07/23/23  0513   POC GLUCOSE mg/dl 77         Results from last 7 days   Lab Units 07/20/23  0318 07/19/23  2347 07/19/23 2011   LACTIC ACID mmol/L 1.7 2.8* 1.4       Lines/Drains:  Invasive Devices     Peripherally Inserted Central Catheter Line  Duration           PICC Line 15/23/77 Right Basilic 2 days          Peripheral Intravenous Line  Duration           Peripheral IV 07/24/23 Right;Ventral (anterior) Forearm 2 days          Drain  Duration           External Urinary Catheter 3 days                Central Line:         Telemetry Reviewed: Normal Sinus Rhythm           Imaging: No pertinent imaging reviewed.     Recent Cultures (last 7 days):         Last 24 Hours Medication List:   Current Facility-Administered Medications   Medication Dose Route Frequency Provider Last Rate   • acetaminophen  650 mg Oral Q6H [address] Shelbi Anthony MD     • aluminum-magnesium hydroxide-simethicone  30 mL Oral Q6H PRN Shelbi Anthony MD     • amiodarone  400 mg Oral BID With Meals Clary Long MD     • apixaban  5 mg Oral BID Xochilt Kyle MD     • aspirin  81 mg Oral Daily Xochilt Kyle MD     • calcium gluconate  2 g Intravenous Once Xochilt Kyle MD     • chlorhexidine  15 mL Mouth/Throat Q12H [address] MD Shawn     • [START ON 7/27/2023] diltiazem  120 mg Oral Daily Robbin Jenkins MD     • diltiazem  10 mg Intravenous Q3H PRN Leeann Garnett PA-C     • diltiazem  30 mg Oral Q6H [address] Robbin Jenkins MD     • diphenhydrAMINE  25 mg Intravenous HS PRN LIBERTY ChandlerNP     • famotidine  20 mg Oral HS Jomar Dela Cruz MD     • fluticasone-vilanterol  1 puff Inhalation Daily Jomar Dela Cruz MD     • furosemide  40 mg Oral BID (diuretic) Robbin Jenkins MD     • LORazepam  1 mg Oral TID PRN JUD Chandler     • methocarbamol  500 mg Oral Q6H [address] Jomar Dela Cruz MD     • metoprolol  10 mg Intravenous Q6H PRN Jomar Dela Cruz MD     • metoprolol tartrate  100 mg Oral Q12H [address] Jomar Dela Cruz MD     • morphine injection  4 mg Intravenous Q2H PRN JUD Chandler     • naloxone  0.04 mg Intravenous Q1MIN PRN Watson Garcia MD     • ondansetron  4 mg Intravenous Q6H PRN Jomar Dela Cruz MD     • oxyCODONE  10 mg Oral Q4H PRN Watson Garcia MD     • oxyCODONE  5 mg Oral Q4H PRN Watson Garcia MD     • potassium chloride  20 mEq Oral BID Robbin Jenkins MD     • sucralfate  1 g Oral 4x Daily [facility]) Jomar Dela Cruz MD          Today, Patient Was Seen By: Anca Ward MD    **Please Note: This note may have been constructed using a voice recognition system. **

## 2023-07-26 NOTE — ASSESSMENT & PLAN NOTE
Stable without exacerbation  Continue Breo 1 puff daily  Continue albuterol as needed  Ambulate as tolerated

## 2023-07-26 NOTE — ASSESSMENT & PLAN NOTE
· Managed medically with aspirin and anticoagulation  · Continue Eliquis and aspirin  Background:  · Pt was admitted 9/22 on the stroke pathway:  MRI negative and sx were attributed to migrane and discharged on ASA and eliquis  · She was diagnosed with left ICA stenosis 80% and underwent unsuccessful CEA with subsequent left facial numbness and left facial asymmetry  · Admitted 2/23 with left facial droop and BUE paresthesias with negative stroke work up that admission

## 2023-07-26 NOTE — PLAN OF CARE
Problem: PAIN - ADULT  Goal: Verbalizes/displays adequate comfort level or baseline comfort level  Description: Interventions:  - Encourage patient to monitor pain and request assistance  - Assess pain using appropriate pain scale  - Administer analgesics based on type and severity of pain and evaluate response  - Implement non-pharmacological measures as appropriate and evaluate response  - Consider cultural and social influences on pain and pain management  - Notify physician/advanced practitioner if interventions unsuccessful or patient reports new pain  Outcome: Progressing     Problem: INFECTION - ADULT  Goal: Absence or prevention of progression during hospitalization  Description: INTERVENTIONS:  - Assess and monitor for signs and symptoms of infection  - Monitor lab/diagnostic results  - Monitor all insertion sites, i.e. indwelling lines, tubes, and drains  - Monitor endotracheal if appropriate and nasal secretions for changes in amount and color  - [place] appropriate cooling/warming therapies per order  - Administer medications as ordered  - Instruct and encourage patient and family to use good hand hygiene technique  - Identify and instruct in appropriate isolation precautions for identified infection/condition  Outcome: Progressing  Goal: Absence of fever/infection during neutropenic period  Description: INTERVENTIONS:  - Monitor WBC    Outcome: Progressing     Problem: SAFETY ADULT  Goal: Patient will remain free of falls  Description: INTERVENTIONS:  - Educate patient/family on patient safety including physical limitations  - Instruct patient to call for assistance with activity   - Consult OT/PT to assist with strengthening/mobility   - Keep Call bell within reach  - Keep bed low and locked with side rails adjusted as appropriate  - Keep care items and personal belongings within reach  - Initiate and maintain comfort rounds  - Make Fall Risk Sign visible to staff  - Offer Toileting every 2 Hours, in advance of need  - Initiate/Maintain bed alarm  - Obtain necessary fall risk management equipment:   - Apply yellow socks and bracelet for high fall risk patients  - Consider moving patient to room near nurses station  Outcome: Progressing  Goal: Maintain or return to baseline ADL function  Description: INTERVENTIONS:  -  Assess patient's ability to carry out ADLs; assess patient's baseline for ADL function and identify physical deficits which impact ability to perform ADLs (bathing, care of mouth/teeth, toileting, grooming, dressing, etc.)  - Assess/evaluate cause of self-care deficits   - Assess range of motion  - Assess patient's mobility; develop plan if impaired  - Assess patient's need for assistive devices and provide as appropriate  - Encourage maximum independence but intervene and supervise when necessary  - Involve family in performance of ADLs  - Assess for home care needs following discharge   - Consider OT consult to assist with ADL evaluation and planning for discharge  - Provide patient education as appropriate  Outcome: Progressing  Goal: Maintains/Returns to pre admission functional level  Description: INTERVENTIONS:  - Perform BMAT or MOVE assessment daily.   - Set and communicate daily mobility goal to care team and patient/family/caregiver. - Collaborate with rehabilitation services on mobility goals if consulted  - Perform Range of Motion 3 times a day. - Reposition patient every 3 hours.   - Dangle patient 3 times a day  - Stand patient 3 times a day  - Ambulate patient 3 times a day  - Out of bed to chair 3 times a day   - Out of bed for meals 3 times a day  - Out of bed for toileting  - Record patient progress and toleration of activity level   Outcome: Progressing     Problem: DISCHARGE PLANNING  Goal: Discharge to home or other facility with appropriate resources  Description: INTERVENTIONS:  - Identify barriers to discharge w/patient and caregiver  - Arrange for needed discharge resources and transportation as appropriate  - Identify discharge learning needs (meds, wound care, etc.)  - Arrange for interpretive services to assist at discharge as needed  - Refer to Case Management Department for coordinating discharge planning if the patient needs post-hospital services based on physician/advanced practitioner order or complex needs related to functional status, cognitive ability, or social support system  Outcome: Progressing     Problem: Knowledge Deficit  Goal: Patient/family/caregiver demonstrates understanding of disease process, treatment plan, medications, and discharge instructions  Description: Complete learning assessment and assess knowledge base.   Interventions:  - Provide teaching at level of understanding  - Provide teaching via preferred learning methods  Outcome: Progressing     Problem: Prexisting or High Potential for Compromised Skin Integrity  Goal: Skin integrity is maintained or improved  Description: INTERVENTIONS:  - Identify patients at risk for skin breakdown  - Assess and monitor skin integrity  - Assess and monitor nutrition and hydration status  - Monitor labs   - Assess for incontinence   - Turn and reposition patient  - Assist with mobility/ambulation  - Relieve pressure over bony prominences  - Avoid friction and shearing  - Provide appropriate hygiene as needed including keeping skin clean and dry  - Evaluate need for skin moisturizer/barrier cream  - Collaborate with interdisciplinary team   - Patient/family teaching  - Consider wound care consult   Outcome: Progressing     Problem: MOBILITY - ADULT  Goal: Maintain or return to baseline ADL function  Description: INTERVENTIONS:  -  Assess patient's ability to carry out ADLs; assess patient's baseline for ADL function and identify physical deficits which impact ability to perform ADLs (bathing, care of mouth/teeth, toileting, grooming, dressing, etc.)  - Assess/evaluate cause of self-care deficits   - Assess range of motion  - Assess patient's mobility; develop plan if impaired  - Assess patient's need for assistive devices and provide as appropriate  - Encourage maximum independence but intervene and supervise when necessary  - Involve family in performance of ADLs  - Assess for home care needs following discharge   - Consider OT consult to assist with ADL evaluation and planning for discharge  - Provide patient education as appropriate  Outcome: Progressing  Goal: Maintains/Returns to pre admission functional level  Description: INTERVENTIONS:  - Perform BMAT or MOVE assessment daily.   - Set and communicate daily mobility goal to care team and patient/family/caregiver. - Collaborate with rehabilitation services on mobility goals if consulted  - Perform Range of Motion 3 times a day. - Reposition patient every 3 hours. - Dangle patient 3 times a day  - Stand patient 3 times a day  - Ambulate patient 3 times a day  - Out of bed to chair 3 times a day   - Out of bed for meals 3 times a day  - Out of bed for toileting  - Record patient progress and toleration of activity level   Outcome: Progressing     Problem: Nutrition/Hydration-ADULT  Goal: Nutrient/Hydration intake appropriate for improving, restoring or maintaining nutritional needs  Description: Monitor and assess patient's nutrition/hydration status for malnutrition. Collaborate with interdisciplinary team and initiate plan and interventions as ordered. Monitor patient's weight and dietary intake as ordered or per policy. Utilize nutrition screening tool and intervene as necessary. Determine patient's food preferences and provide high-protein, high-caloric foods as appropriate.      INTERVENTIONS:  - Monitor oral intake, urinary output, labs, and treatment plans  - Assess nutrition and hydration status and recommend course of action  - Evaluate amount of meals eaten  - Assist patient with eating if necessary   - Allow adequate time for meals  - Recommend/ encourage appropriate diets, oral nutritional supplements, and vitamin/mineral supplements  - Order, calculate, and assess calorie counts as needed  - Recommend, monitor, and adjust tube feedings and TPN/PPN based on assessed needs  - Assess need for intravenous fluids  - Provide specific nutrition/hydration education as appropriate  - Include patient/family/caregiver in decisions related to nutrition  Outcome: Progressing

## 2023-07-26 NOTE — PROGRESS NOTES
Progress Note - Cardiology   Sangeetha Farnsworth 70 y.o. female MRN: [id number]  Unit/Bed#: E4 -01 Encounter: [id number]    Assessment:    • Paroxysmal atrial fibrillation with rapid ventricular response now in sinus rhythm  • Tachycardia bradycardia syndrome  • Ischemic colitis with hematochezia, prior history of SMA thrombosis, now status post hemicolectomy with primary anastomosis 7/19/2023  • Mild to moderate aortic regurgitation  • COPD  • Idiopathic intracranial hemorrhage with chronic migraine  • Primary hypertension  • Left carotid artery stenosis  • Fluid overload approaching euvolemia    Plan:    • Potassium up to 4.3, will decrease KDur to 20 mEq twice daily  • Continue amiodarone 400 mg twice daily for further loading  • Will change furosemide 40 mg IV twice daily to 40 mg p.o. twice daily  • We will change Cardizem 30 mg every 6 hours to Cardizem CD at 120 mg daily starting tomorrow morning  • Patient on spironolactone at home rather than potassium supplementation. However her blood pressure has been somewhat soft at times. We will wait till patient is out of the hospital and ambulating regularly before changing to spironolactone. Interval history:  Patient feeling good and wants to get up and around more and walk more.     PROBLEM LIST:    Patient Active Problem List    Diagnosis Date Noted   • Acute postoperative pain 07/20/2023   • Colitis 07/19/2023   • Chronic migraine w/o aura w/o status migrainosus, not intractable 07/19/2023   • IIH (idiopathic intracranial hypertension) 07/19/2023   • Hematochezia 07/19/2023   • Left carotid stenosis 07/19/2023   • Colonic ischemia ([address]) 07/19/2023   • Injury of left facial nerve 03/17/2023   • Partial facial nerve palsy 02/01/2023   • Hepatic steatosis 12/19/2022   • Asymptomatic stenosis of left carotid artery 10/05/2022   • Stenosis of left carotid artery 09/06/2022   • Carotid artery stenosis 09/02/2022   • Appendix disease 04/29/2022   • Urinary retention 03/17/2022   • Peripheral vascular disease ([address]) 03/17/2022   • 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   • COPD (chronic obstructive pulmonary disease) ([address]) 08/19/2021   • Unspecified diastolic (congestive) heart failure ([address]) 03/12/2021   • Hypertensive heart disease with congestive heart failure ([address]) 12/21/2020   • CAMILLE (obstructive sleep apnea)    • Lumbar radiculopathy 10/01/2020   • Paroxysmal atrial fibrillation ([address]) 03/19/2020   • Allergic conjunctivitis of both eyes 01/22/2020   • Angio-edema 01/22/2020   • Gastroesophageal reflux disease without esophagitis 08/12/2019   • Allergic reaction 12/03/2018   • AMD (age-related macular degeneration), bilateral 11/16/2018   • Angina pectoris ([address]) 11/15/2017   • Migraines 10/14/2016   • Essential hypertension 08/13/2015       Vitals: /69 (BP Location: Left arm)   Pulse 79   Temp (!) 97.4 °F (36.3 °C) (Temporal)   Resp 20   Ht 5' 4" (1.626 m)   Wt 88.9 kg (195 lb 15.8 oz)   LMP  (LMP Unknown)   SpO2 95%   BMI 33.64 kg/m²   PREVIOUS WEIGHTS:   Weight (last 2 days)     Date/Time Weight    07/26/23 0600 88.9 (195.99)    07/25/23 0600 87 (191.8)          Wt Readings from Last 2 Encounters:   07/26/23 88.9 kg (195 lb 15.8 oz)   07/07/23 86.3 kg (190 lb 3.2 oz)       Labs/Data:        Results from last 7 days   Lab Units 07/26/23  0524 07/25/23  0348 07/24/23  0212   WBC Thousand/uL 8.39 8.20 8.79   HEMOGLOBIN g/dL 8.2* 8.5* 9.3*   HEMATOCRIT % 25.9* 26.2* 29.0*   MCV fL 94 93 92   MCH pg 29.7 30.0 29.4   MCHC g/dL 31.7 32.4 32.1   PLATELETS Thousands/uL 267 221 226     Results from last 7 days   Lab Units 07/26/23  0524 07/25/23  0348 07/24/23  0212   EGFR ml/min/1.73sq m 96 106 99   SODIUM mmol/L 137 138 138   POTASSIUM mmol/L 4.3 3.4* 3.0*   CHLORIDE mmol/L 101 100 101   CO2 mmol/L 32 30 28   BUN mg/dL 9 6 8   CREATININE mg/dL 0.59* 0.44* 0.53*     Results from last 7 days   Lab Units 07/26/23  0524 07/25/23  0348 07/24/23  8411 07/23/23  0449   CALCIUM mg/dL 8.4 8.3* 8.1* 7.5*   MAGNESIUM mg/dL 2.0 1.8*  --  2.1     Lab Results   Component Value Date    NTBNP 187 (H) 06/06/2022    NTBNP 258 (H) 05/03/2022     Lab Results   Component Value Date    CHOLESTEROL 220 (H) 05/23/2023    TRIG 257 (H) 05/23/2023    HDL 58 05/23/2023    LDLCALC 111 (H) 05/23/2023    NONHDL 94 04/19/2021    HGBA1C 5.5 05/23/2023       Results from last 7 days   Lab Units 07/21/23  0559   INR  1.49*   PROTIME seconds 18.0*          Current Facility-Administered Medications:   •  acetaminophen (TYLENOL) tablet 650 mg, 650 mg, Oral, Q6H [address], Nenita Krause MD, 650 mg at 07/26/23 0526  •  aluminum-magnesium hydroxide-simethicone (MAALOX) oral suspension 30 mL, 30 mL, Oral, Q6H PRN, Nenita Krause MD, 30 mL at 07/25/23 1727  •  amiodarone tablet 400 mg, 400 mg, Oral, BID With Meals, Lacy Cano MD, 400 mg at 07/26/23 1255  •  apixaban (ELIQUIS) tablet 5 mg, 5 mg, Oral, BID, Shavon Wills MD, 5 mg at 07/26/23 8904  •  aspirin (ECOTRIN LOW STRENGTH) EC tablet 81 mg, 81 mg, Oral, Daily, Shavon Wills MD, 81 mg at 07/26/23 2321  •  calcium gluconate 2 g in sodium chloride 0.9% 100 mL (premix), 2 g, Intravenous, Once, Shavon Wills MD  •  chlorhexidine (PERIDEX) 0.12 % oral rinse 15 mL, 15 mL, Mouth/Throat, Q12H [address], Nenita Krause MD, 15 mL at 07/26/23 0855  •  diltiazem (CARDIZEM) injection 10 mg, 10 mg, Intravenous, Q3H PRN, Kaveh Garnett PA-C, 10 mg at 07/24/23 1944  •  diltiazem (CARDIZEM) tablet 30 mg, 30 mg, Oral, Q6H [address], [facility] MD Ketan, 30 mg at 07/26/23 9594  •  diphenhydrAMINE (BENADRYL) injection 25 mg, 25 mg, Intravenous, HS PRN, JUD Kim  •  famotidine (PEPCID) tablet 20 mg, 20 mg, Oral, HS, Nenita Krause MD, 20 mg at 07/25/23 2133  •  fluticasone-vilanterol 200-25 mcg/actuation 1 puff, 1 puff, Inhalation, Daily, Nenita Krause MD, 1 puff at 07/26/23 0873  •  furosemide (LASIX) injection 40 mg, 40 mg, Intravenous, BID (diuretic), Clary Long MD, 40 mg at 07/26/23 9100  •  LORazepam (ATIVAN) tablet 1 mg, 1 mg, Oral, TID PRN, [facility], CRNP, 1 mg at 07/24/23 0404  •  methocarbamol (ROBAXIN) tablet 500 mg, 500 mg, Oral, Q6H [address], Shelbi Anthony MD, 500 mg at 07/26/23 0526  •  metoprolol (LOPRESSOR) injection 10 mg, 10 mg, Intravenous, Q6H PRN, Shelbi Anthony MD, 10 mg at 07/23/23 0329  •  metoprolol tartrate (LOPRESSOR) tablet 100 mg, 100 mg, Oral, Q12H [address], Shelbi Anthony MD, 100 mg at 07/26/23 0855  •  morphine injection 4 mg, 4 mg, Intravenous, Q2H PRN, [facility], CRNP, 4 mg at 07/25/23 2131  •  naloxone (NARCAN) 0.04 mg/mL syringe 0.04 mg, 0.04 mg, Intravenous, Q1MIN PRN, Xochilt Kyle MD  •  ondansetron [facility]) injection 4 mg, 4 mg, Intravenous, Q6H PRN, Shelbi Anthony MD, 4 mg at 07/25/23 2131  •  oxyCODONE (ROXICODONE) immediate release tablet 10 mg, 10 mg, Oral, Q4H PRN, Xochilt Kyle MD, 10 mg at 07/25/23 0000  •  oxyCODONE (ROXICODONE) IR tablet 5 mg, 5 mg, Oral, Q4H PRN, Xochilt Kyle MD, 5 mg at 07/26/23 0022  •  potassium chloride (K-DUR,KLOR-CON) CR tablet 40 mEq, 40 mEq, Oral, BID, Clary Long MD, 40 mEq at 07/26/23 0855  •  sucralfate (CARAFATE) tablet 1 g, 1 g, Oral, 4x Daily (AC & HS), Shelbi Anthony MD, 1 g at 07/26/23 9152  Allergies   Allergen Reactions   • Ace Inhibitors Angioedema and Anaphylaxis     Anaphylaxis   • Benicar [Olmesartan] Angioedema     See Allergy note from 9/11/2008. Swollen ankles/legs   • Hydralazine Other (See Comments)     Lip swelling  Flank pain   • Other Anaphylaxis and Other (See Comments)     Other reaction(s): angioadema  Other reaction(s):  Other (See Comments)  Preservatives- itching throat closes, hi  Other reaction(s): angioadema  E Z Cat - hives throat closes itching,  Preservatives- itching throat closes, hi  Artificial sweeteners   • Valsartan Angioedema     Lips, face swollen   • Sulfa Antibiotics Hives stuffiness,itching,hives,throat closing--per pt "sometimes able to take depending on the brand and the filler or possibly preservatives in it"   • Ampicillin Hives     Depends on brand some preservatives can react. Has recently tolerated oral amoxicillin. • Motrin [Ibuprofen] Other (See Comments)     Per pt " told not to take due to A Fib"   • Wound Dressing Adhesive Other (See Comments)     Per pt Adhesive on EKG leads caused redness         Intake/Output Summary (Last 24 hours) at 7/26/2023 0905  Last data filed at 7/26/2023 9530  Gross per 24 hour   Intake --   Output 1500 ml   Net -1500 ml       Invasive Devices     Peripherally Inserted Central Catheter Line  Duration           PICC Line 04/89/47 Right Basilic 1 day          Peripheral Intravenous Line  Duration           Peripheral IV 07/24/23 Right;Ventral (anterior) Forearm 2 days          Drain  Duration           External Urinary Catheter 3 days                Review of Systems   Respiratory: Negative for cough, choking, chest tightness, shortness of breath and wheezing. Cardiovascular: Negative for chest pain, palpitations and leg swelling. Musculoskeletal: Negative for gait problem. Skin: Negative for rash. Neurological: Negative for dizziness, tremors, syncope, weakness, light-headedness, numbness and headaches. Psychiatric/Behavioral: Negative for agitation and behavioral problems. The patient is not hyperactive. Physical Exam  Constitutional:       General: She is not in acute distress. Appearance: She is well-developed. HENT:      Head: Normocephalic and atraumatic. Neck:      Thyroid: No thyromegaly. Vascular: No carotid bruit or JVD. Trachea: No tracheal deviation. Cardiovascular:      Rate and Rhythm: Normal rate and regular rhythm. Pulses: Normal pulses. Heart sounds: Normal heart sounds. No murmur heard. No friction rub. No gallop.    Pulmonary:      Effort: Pulmonary effort is normal. No respiratory distress. Breath sounds: Normal breath sounds. No wheezing, rhonchi or rales. Chest:      Chest wall: No tenderness. Musculoskeletal:         General: Normal range of motion. Cervical back: Normal range of motion and neck supple. Right lower leg: No edema. Left lower leg: No edema. Skin:     General: Skin is warm and dry. Neurological:      General: No focal deficit present. Mental Status: She is alert and oriented to person, place, and time. Psychiatric:         Mood and Affect: Mood normal.         Behavior: Behavior normal.         Thought Content: Thought content normal.         Judgment: Judgment normal.         ======================================================     Imaging:   I have personally reviewed pertinent reports. EKG: Normal sinus rhythm. No further SVT since 1612 yesterday on higher dose amiodarone      Portions of the record may have been created with voice recognition software. Occasional wrong word or "sound a like" substitutions may have occurred due to the inherent limitations of voice recognition software. Read the chart carefully and recognize, using context, where substitutions have occurred.

## 2023-07-26 NOTE — ASSESSMENT & PLAN NOTE
· Status post laparotomy, colon resection, anastomosis on 7/19/2023  · Intraoperatively, she was found to have ischemia of the transverse, distal and sigmoid colon  · Repeat ct on 7/24/23 without bowel leak or suspicious collection  · Postop management, diet, pain control per primary service

## 2023-07-27 LAB
ANION GAP SERPL CALCULATED.3IONS-SCNC: 8 MMOL/L
BUN SERPL-MCNC: 7 MG/DL (ref 5–25)
CALCIUM SERPL-MCNC: 8.4 MG/DL (ref 8.4–10.2)
CHLORIDE SERPL-SCNC: 101 MMOL/L (ref 96–108)
CO2 SERPL-SCNC: 31 MMOL/L (ref 21–32)
CREAT SERPL-MCNC: 0.61 MG/DL (ref 0.6–1.3)
ERYTHROCYTE [DISTWIDTH] IN BLOOD BY AUTOMATED COUNT: 13.5 % (ref 11.6–15.1)
GFR SERPL CREATININE-BSD FRML MDRD: 95 ML/MIN/1.73SQ M
GLUCOSE SERPL-MCNC: 91 MG/DL (ref 65–140)
HCT VFR BLD AUTO: 27.6 % (ref 34.8–46.1)
HGB BLD-MCNC: 8.5 G/DL (ref 11.5–15.4)
MCH RBC QN AUTO: 29 PG (ref 26.8–34.3)
MCHC RBC AUTO-ENTMCNC: 30.8 G/DL (ref 31.4–37.4)
MCV RBC AUTO: 94 FL (ref 82–98)
PHOSPHATE SERPL-MCNC: 3.7 MG/DL (ref 2.3–4.1)
PLATELET # BLD AUTO: 314 THOUSANDS/UL (ref 149–390)
PMV BLD AUTO: 9.9 FL (ref 8.9–12.7)
POTASSIUM SERPL-SCNC: 3.4 MMOL/L (ref 3.5–5.3)
RBC # BLD AUTO: 2.93 MILLION/UL (ref 3.81–5.12)
SODIUM SERPL-SCNC: 140 MMOL/L (ref 135–147)
WBC # BLD AUTO: 7.69 THOUSAND/UL (ref 4.31–10.16)

## 2023-07-27 PROCEDURE — 99232 SBSQ HOSP IP/OBS MODERATE 35: CPT

## 2023-07-27 PROCEDURE — 85027 COMPLETE CBC AUTOMATED: CPT | Performed by: STUDENT IN AN ORGANIZED HEALTH CARE EDUCATION/TRAINING PROGRAM

## 2023-07-27 PROCEDURE — 84100 ASSAY OF PHOSPHORUS: CPT | Performed by: STUDENT IN AN ORGANIZED HEALTH CARE EDUCATION/TRAINING PROGRAM

## 2023-07-27 PROCEDURE — 97116 GAIT TRAINING THERAPY: CPT

## 2023-07-27 PROCEDURE — 97535 SELF CARE MNGMENT TRAINING: CPT

## 2023-07-27 PROCEDURE — 97530 THERAPEUTIC ACTIVITIES: CPT

## 2023-07-27 PROCEDURE — 97110 THERAPEUTIC EXERCISES: CPT

## 2023-07-27 PROCEDURE — 99024 POSTOP FOLLOW-UP VISIT: CPT | Performed by: SURGERY

## 2023-07-27 PROCEDURE — 80048 BASIC METABOLIC PNL TOTAL CA: CPT | Performed by: STUDENT IN AN ORGANIZED HEALTH CARE EDUCATION/TRAINING PROGRAM

## 2023-07-27 PROCEDURE — 99231 SBSQ HOSP IP/OBS SF/LOW 25: CPT | Performed by: INTERNAL MEDICINE

## 2023-07-27 RX ORDER — MINERAL OIL 100 G/100G
1 OIL RECTAL ONCE
Status: DISCONTINUED | OUTPATIENT
Start: 2023-07-27 | End: 2023-07-28 | Stop reason: HOSPADM

## 2023-07-27 RX ORDER — POTASSIUM CHLORIDE 20 MEQ/1
20 TABLET, EXTENDED RELEASE ORAL
Status: DISCONTINUED | OUTPATIENT
Start: 2023-07-27 | End: 2023-07-28 | Stop reason: HOSPADM

## 2023-07-27 RX ORDER — POTASSIUM CHLORIDE 14.9 MG/ML
20 INJECTION INTRAVENOUS ONCE
Status: COMPLETED | OUTPATIENT
Start: 2023-07-27 | End: 2023-07-28

## 2023-07-27 RX ADMIN — FUROSEMIDE 40 MG: 40 TABLET ORAL at 08:05

## 2023-07-27 RX ADMIN — DILTIAZEM HYDROCHLORIDE 120 MG: 120 CAPSULE, COATED, EXTENDED RELEASE ORAL at 08:04

## 2023-07-27 RX ADMIN — FUROSEMIDE 40 MG: 40 TABLET ORAL at 17:27

## 2023-07-27 RX ADMIN — CHLORHEXIDINE GLUCONATE 15 ML: 1.2 RINSE ORAL at 08:05

## 2023-07-27 RX ADMIN — FAMOTIDINE 20 MG: 20 TABLET ORAL at 23:21

## 2023-07-27 RX ADMIN — SUCRALFATE 1 G: 1 TABLET ORAL at 23:21

## 2023-07-27 RX ADMIN — OXYCODONE HYDROCHLORIDE 10 MG: 10 TABLET ORAL at 03:03

## 2023-07-27 RX ADMIN — POTASSIUM CHLORIDE 20 MEQ: 1500 TABLET, EXTENDED RELEASE ORAL at 17:27

## 2023-07-27 RX ADMIN — SUCRALFATE 1 G: 1 TABLET ORAL at 17:31

## 2023-07-27 RX ADMIN — METHOCARBAMOL 500 MG: 500 TABLET ORAL at 05:03

## 2023-07-27 RX ADMIN — ONDANSETRON 4 MG: 2 INJECTION INTRAMUSCULAR; INTRAVENOUS at 14:17

## 2023-07-27 RX ADMIN — LORAZEPAM 1 MG: 1 TABLET ORAL at 17:27

## 2023-07-27 RX ADMIN — APIXABAN 5 MG: 5 TABLET, FILM COATED ORAL at 17:28

## 2023-07-27 RX ADMIN — POTASSIUM CHLORIDE 20 MEQ: 1500 TABLET, EXTENDED RELEASE ORAL at 11:25

## 2023-07-27 RX ADMIN — POTASSIUM CHLORIDE 20 MEQ: 1500 TABLET, EXTENDED RELEASE ORAL at 08:05

## 2023-07-27 RX ADMIN — POTASSIUM CHLORIDE 20 MEQ: 14.9 INJECTION, SOLUTION INTRAVENOUS at 11:06

## 2023-07-27 RX ADMIN — METHOCARBAMOL 500 MG: 500 TABLET ORAL at 17:27

## 2023-07-27 RX ADMIN — ACETAMINOPHEN 325MG 650 MG: 325 TABLET ORAL at 05:03

## 2023-07-27 RX ADMIN — OXYCODONE HYDROCHLORIDE 10 MG: 10 TABLET ORAL at 14:17

## 2023-07-27 RX ADMIN — METOPROLOL TARTRATE 100 MG: 100 TABLET, FILM COATED ORAL at 23:17

## 2023-07-27 RX ADMIN — OXYCODONE HYDROCHLORIDE 10 MG: 10 TABLET ORAL at 08:04

## 2023-07-27 RX ADMIN — METHOCARBAMOL 500 MG: 500 TABLET ORAL at 23:16

## 2023-07-27 RX ADMIN — AMIODARONE HYDROCHLORIDE 400 MG: 200 TABLET ORAL at 08:04

## 2023-07-27 RX ADMIN — SUCRALFATE 1 G: 1 TABLET ORAL at 05:03

## 2023-07-27 RX ADMIN — ONDANSETRON 4 MG: 2 INJECTION INTRAMUSCULAR; INTRAVENOUS at 03:03

## 2023-07-27 RX ADMIN — APIXABAN 5 MG: 5 TABLET, FILM COATED ORAL at 08:04

## 2023-07-27 RX ADMIN — AMIODARONE HYDROCHLORIDE 400 MG: 200 TABLET ORAL at 17:28

## 2023-07-27 RX ADMIN — METOPROLOL TARTRATE 100 MG: 100 TABLET, FILM COATED ORAL at 08:04

## 2023-07-27 RX ADMIN — ASPIRIN 81 MG: 81 TABLET, COATED ORAL at 08:05

## 2023-07-27 RX ADMIN — SUCRALFATE 1 G: 1 TABLET ORAL at 11:25

## 2023-07-27 RX ADMIN — CHLORHEXIDINE GLUCONATE 15 ML: 1.2 RINSE ORAL at 23:20

## 2023-07-27 RX ADMIN — METHOCARBAMOL 500 MG: 500 TABLET ORAL at 11:25

## 2023-07-27 RX ADMIN — ACETAMINOPHEN 325MG 650 MG: 325 TABLET ORAL at 11:25

## 2023-07-27 RX ADMIN — ACETAMINOPHEN 325MG 650 MG: 325 TABLET ORAL at 17:28

## 2023-07-27 RX ADMIN — OXYCODONE HYDROCHLORIDE 10 MG: 10 TABLET ORAL at 19:41

## 2023-07-27 RX ADMIN — ACETAMINOPHEN 325MG 650 MG: 325 TABLET ORAL at 23:16

## 2023-07-27 RX ADMIN — FLUTICASONE FUROATE AND VILANTEROL TRIFENATATE 1 PUFF: 200; 25 POWDER RESPIRATORY (INHALATION) at 08:07

## 2023-07-27 NOTE — PROGRESS NOTES
Progress Note - General Surgery   Leonard Paz 70 y.o. female MRN: [id number]  Unit/Bed#: E4 -01 Encounter: [id number]    Assessment:  70 F w/ pmh of HTN, HLD, afib, CAD, COPD, SMA thrombosis s/p stent presents with abdominal pain, hematochezia and c/f ischemic colitis. Now s/p diagnostic lap converted to ex lap with extended left hemicolectomy and primary anastomosis 7/19    AVSS on RA      Plan:  • Lo res diet  • Continue doac  • Appreciate Cards recs  • Appreciate slim recs  • PT/OT-post acute rehab  • dispo planning  • Please TigerText on call SLA surgery resident or AP with any questions       Subjective/Objective     Subjective:   No acute events overnight. Passing gas. No bowel meds. Having some nausea no vomiting. Pain improved. Objective:     Blood pressure 137/69, pulse 80, temperature (!) 97 °F (36.1 °C), temperature source Temporal, resp. rate 18, height 5' 4" (1.626 m), weight 88.9 kg (195 lb 15.8 oz), SpO2 92 %, not currently breastfeeding. ,Body mass index is 33.64 kg/m².       Intake/Output Summary (Last 24 hours) at 7/26/2023 2012  Last data filed at 7/26/2023 4091  Gross per 24 hour   Intake --   Output 500 ml   Net -500 ml       Invasive Devices     Peripherally Inserted Central Catheter Line  Duration           PICC Line 22/66/48 Right Basilic 2 days          Peripheral Intravenous Line  Duration           Peripheral IV 07/24/23 Right;Ventral (anterior) Forearm 2 days          Drain  Duration           External Urinary Catheter 3 days                Physical Exam:   Gen: NAD, Comfortable  Neuro: A&O, No focal deficits  Head: Normal Cephalic, Atraumatic  Eye: EOMI, PERRLA, No scleral icterus  Neck: Supple, No JVD, Midline trachea  CV: RRR, Cap refill <2 sec  Pulm: Normal work of breathing, no respiratory distress  Abd: Soft, Non-Distended, mild tenderness  Ext: No edema, Non-tender  Skin: warm, dry, intact

## 2023-07-27 NOTE — OCCUPATIONAL THERAPY NOTE
Occupational Therapy Progress Note     Patient Name: Evert Urena  Today's Date: 7/27/2023  Problem List  Principal Problem:    Colonic ischemia [facility])  Active Problems:    Essential hypertension    Paroxysmal atrial fibrillation (HCC)    COPD (chronic obstructive pulmonary disease) (HCC)    Colitis    Chronic migraine w/o aura w/o status migrainosus, not intractable    IIH (idiopathic intracranial hypertension)    Hematochezia    Left carotid stenosis    Acute postoperative pain          07/27/23 1055   OT Last Visit   OT Visit Date 07/27/23   Note Type   Note Type Treatment   Pain Assessment   Pain Assessment Tool 0-10   Pain Score 4   Pain Location/Orientation Location: Generalized; Location: Abdomen   Restrictions/Precautions   Weight Bearing Precautions Per Order No   Other Precautions Multiple lines; Fall Risk;Pain;Telemetry   ADL   UB Bathing Assistance 5  Supervision/Setup   UB Bathing Deficit Setup;Verbal cueing;Supervision/safety; Increased time to complete   LB Bathing Assistance 5  Supervision/Setup   LB Bathing Deficit Setup;Verbal cueing;Supervision/safety; Increased time to complete;Perineal area   LB Dressing Assistance 5  Supervision/Setup   LB Dressing Deficit Setup;Verbal cueing;Supervision/safety; Increased time to complete; Don/doff R sock; Don/doff L sock   Toileting Assistance  5  Supervision/Setup   Toileting Deficit Setup;Verbal cueing;Supervison/safety; Increased time to complete   Functional Standing Tolerance   Time 2-3 min   Activity self care tasks. Comments fair balance. RW for support. Bed Mobility   Supine to Sit 5  Supervision   Additional items HOB elevated; Bedrails; Increased time required   Additional Comments left seated up in chair, call light in reach. Transfers   Sit to Stand 5  Supervision   Additional items Bedrails;Armrests; Increased time required;Verbal cues   Stand to Sit 5  Supervision   Additional items Bedrails;Armrests; Increased time required;Verbal cues   Toilet transfer 5  Supervision   Additional items Increased time required;Verbal cues; Commode;Armrests   Additional Comments cues for hand placement and best tech   Functional Mobility   Functional Mobility 5  Supervision   Additional Comments Ax1, RW, household distances. Additional items Rolling walker   Therapeutic Exercise - ROM   UE-ROM Yes   ROM- Right Upper Extremities   R Shoulder AROM; Flexion;ABduction; Extension;Horizontal ABduction   R Elbow AROM;Elbow flexion;Elbow extension   R Position Seated   R Weight/Reps/Sets 2 x10   ROM - Left Upper Extremities    L Shoulder AROM; Flexion; Horizontal ABduction; Extension;ABduction   L Elbow AROM;Elbow flexion;Elbow extension   L Position Seated   L Weight/Reps/Sets 2 X10   Subjective   Subjective "I am feeling better today"   Cognition   Overall Cognitive Status WFL   Arousal/Participation Cooperative;Responsive; Alert   Attention Attends with cues to redirect   Orientation Level Oriented X4   Memory Within functional limits   Following Commands Follows one step commands without difficulty   Activity Tolerance   Activity Tolerance Patient tolerated treatment well   Medical Staff Made Aware RN Roosevelt Huitron, PT [place]   Assessment   Assessment Pt seen for skilled OT tx this date. Tx focused on improving strength, activity tolerance and balance, safety awareness to increase independence with self care tasks. Pt tolerated session well. Pt was limited by weakness. Pt greeted in supine and agreeable to skilled OT session. Pt performed UB dressing/ bathing with supervision, LB dressing / bathing supervision , Toileting supervision,  bed mobility supervision, transfers supervision, mobility with RW supervision household distances. Pt demonstrated fair- standing balance during functional tasks, no LOB. Pt demonstrated ability to safely and appropriately attend to all tasks during session. Pt required moderate verbal cuing during session to safely complete tasks.  Pt completed 3x10 of various BUE exercises to increase strength. Performed while seated. Tolerated well. Current OT DC recommendations for pt is post acute rehab services Vs home with home health services. Plan   Treatment Interventions ADL retraining;Functional transfer training; Endurance training;Patient/family training; Compensatory technique education; Energy conservation; Activityengagement;Equipment evaluation/education   Goal Expiration Date 08/04/23   OT Treatment Day 2   OT Frequency 3-5x/wk   Recommendation   OT Discharge Recommendation Post acute rehabilitation services   Additional Comments  The patient's raw score on the AM-PAC Daily Activity Inpatient Short Form is 19. A raw score of greater than or equal to 19 suggests the patient may benefit from discharge to home. Please refer to the recommendation of the Occupational Therapist for safe discharge planning.    AM-PAC Daily Activity Inpatient   Lower Body Dressing 3   Bathing 3   Toileting 3   Upper Body Dressing 3   Grooming 3   Eating 4   Daily Activity Raw Score 19   Daily Activity Standardized Score (Calc for Raw Score >=11) 40.22   AM-PAC Applied Cognition Inpatient   Following a Speech/Presentation 4   Understanding Ordinary Conversation 4   Taking Medications 4   Remembering Where Things Are Placed or Put Away 4   Remembering List of 4-5 Errands 4   Taking Care of Complicated Tasks 4   Applied Cognition Raw Score 24   Applied Cognition Standardized Score 62.21   [facility], OT

## 2023-07-27 NOTE — CASE MANAGEMENT
Case Management Discharge Planning Note    Patient name Ulises Zapata  Location [place] [address] [id number] [place], [place] [address] [place] [address]-* MRN [id number]  : 1960 Date 2023       Current Admission Date: 2023  Current Admission Diagnosis:Colonic ischemia [facility])   Patient Active Problem List    Diagnosis Date Noted   • Acute postoperative pain 2023   • Colitis 2023   • Chronic migraine w/o aura w/o status migrainosus, not intractable 2023   • IIH (idiopathic intracranial hypertension) 2023   • Hematochezia 2023   • Left carotid stenosis 2023   • Colonic ischemia ([address]) 2023   • Injury of left facial nerve 2023   • Partial facial nerve palsy 2023   • Hepatic steatosis 2022   • Asymptomatic stenosis of left carotid artery 10/05/2022   • Stenosis of left carotid artery 2022   • Carotid artery stenosis 2022   • Appendix disease 2022   • Urinary retention 2022   • Peripheral vascular disease ([address]) 2022   • 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   • COPD (chronic obstructive pulmonary disease) ([address]) 2021   • Unspecified diastolic (congestive) heart failure ([address]) 2021   • Hypertensive heart disease with congestive heart failure ([address]) 2020   • CAMILLE (obstructive sleep apnea)    • Lumbar radiculopathy 10/01/2020   • Paroxysmal atrial fibrillation ([address]) 2020   • Allergic conjunctivitis of both eyes 2020   • Angio-edema 2020   • Gastroesophageal reflux disease without esophagitis 2019   • Allergic reaction 2018   • AMD (age-related macular degeneration), bilateral 2018   • Angina pectoris ([address]) 11/15/2017   • Migraines 10/14/2016   • Essential hypertension 2015      LOS (days): 8  Geometric Mean LOS (GMLOS) (days): 5.30  Days to GMLOS:-3.3     OBJECTIVE:  Risk of Unplanned Readmission Score: 24.94         Current admission status: Inpatient   Preferred Pharmacy:   [address] [place] [address] [id number] - [place] - [address]  Phone: [phone] Fax: [phone]    [address] [facility], [address] [facility]erg[facility] with [facility] [address] [facility]  [address] [facility] [address] [facility]  Phone: [phone] Fax: [address] [facility], [address] - [address] [facility],  [address] [facility],  [facility] [address]  Phone: [phone] Fax: [phone]    Primary Care Provider: Elvia Ferro DO    Primary Insurance: [facility] REP  Secondary Insurance:     DISCHARGE DETAILS:                         Additional Comments: LSW covering Thursday. [facility] in [place] for pt to come. Completed PASRR and attached in [address]  Just waiting on Cards to [address] [facility] Name, [address] [facility] [address] : [place] [address] [facility]  Receiving Facility/Agency Phone Number: [phone]  Facility/Agency Fax Number: [phone]

## 2023-07-27 NOTE — PLAN OF CARE
Problem: PHYSICAL THERAPY ADULT  Goal: Performs mobility at highest level of function for planned discharge setting. See evaluation for individualized goals. Description: Treatment/Interventions: Functional transfer training, LE strengthening/ROM, Therapeutic exercise, Elevations, Endurance training, Patient/family training, Bed mobility, Gait training, Spoke to nursing, OT  Equipment Recommended: Jack Goltz       See flowsheet documentation for full assessment, interventions and recommendations. 7/27/2023 1548 by Rojelio Lauren PT  Outcome: Progressing  Note: Prognosis: Good  Problem List: Decreased strength, Decreased endurance, Impaired balance, Decreased mobility, Obesity  Assessment: Pt seen for PT per POC. Improved mobility & activity tolerance noted this tx session. Pt progressed to S for transfers & amb w/ RW + cues for techniques & safety. Gait deviations as above, slow w/ WBOS & dec foot clearance but no gross LOB noted. Improved amb tolerance compared to previous session. (+) fatigue after amb but relieved w/ rest. Pt tolerated above mentioned thera. ex well. Pt require cues for proper exercise techniques and performance. Pt also require rest period in between exercises 2* to fatigue. Please see flowsheet above for objective findings & levels of assistance required for all functional tasks during this tx session. No SOB & dizziness reported t/o session. Nsg staff most recent vital signs as follows: BP (!) 106/46 (BP Location: Left arm)   Pulse 72   Temp 97.7 °F (36.5 °C) (Temporal)   Resp 18   Ht 5' 4" (1.626 m)   Wt 87.2 kg (192 lb 3.9 oz)   LMP  (LMP Unknown)   SpO2 97%   BMI 33.00 kg/m² . Will continue PT per POC. At end of session, pt OOB in chair in stable condition, call bell & phone in reach, all lines intact. Fall precautions reinforced w/ good understanding. The patient's AM-PAC Basic Mobility Inpatient Short Form Raw Score is 18.  A Raw score of greater than 16 suggests the patient may benefit from discharge to home. Please also refer to the recommendation of the Physical Therapist for safe discharge planning. From PT standpoint, pt may D/C home w/ HHPT vs STR pending progress & available support at home. CM to follow. Will recommend restorative for daily amb to prevent decline in function. Nsg staff to continue to mobilized pt (OOB in chair for all meals & ambulate in room/unit) as tolerated to inc activity level while in hospital. Nsg notified. Co-tx was necessary to complete this PT tx session for the pt's best interest given pt's medical acuity & complexity. Barriers to Discharge: None     PT Discharge Recommendation: Home with home health rehabilitation (vs STR pending progress & available support at home)    See flowsheet documentation for full assessment.

## 2023-07-27 NOTE — PLAN OF CARE
Problem: OCCUPATIONAL THERAPY ADULT  Goal: Performs self-care activities at highest level of function for planned discharge setting. See evaluation for individualized goals. Description: Treatment Interventions: ADL retraining, Functional transfer training, Endurance training, Patient/family training, Compensatory technique education, Energy conservation, Activityengagement, Equipment evaluation/education          See flowsheet documentation for full assessment, interventions and recommendations. Outcome: Progressing  Note: Limitation: Decreased ADL status, Decreased self-care trans, Decreased high-level ADLs, Decreased endurance  Prognosis: Good  Assessment: Pt seen for skilled OT tx this date. Tx focused on improving strength, activity tolerance and balance, safety awareness to increase independence with self care tasks. Pt tolerated session well. Pt was limited by weakness. Pt greeted in supine and agreeable to skilled OT session. Pt performed UB dressing/ bathing with supervision, LB dressing / bathing supervision , Toileting supervision,  bed mobility supervision, transfers supervision, mobility with RW supervision household distances. Pt demonstrated fair- standing balance during functional tasks, no LOB. Pt demonstrated ability to safely and appropriately attend to all tasks during session. Pt required moderate verbal cuing during session to safely complete tasks. Pt completed 3x10 of various BUE exercises to increase strength. Performed while seated. Tolerated well. Current OT DC recommendations for pt is post acute rehab services Vs home with home health services.      OT Discharge Recommendation: Post acute rehabilitation services

## 2023-07-27 NOTE — PHYSICAL THERAPY NOTE
PT PROGRESS NOTE    Name: Marvin Lui  AGE: 70 y.o. MRN: [id number]  LENGTH OF STAY: 8    Admitting Dx:  Lactic acidosis [E87.20]  Colitis [K52.9]  GI bleeding [K92.2]  GI bleed [K92.2]      Patient Active Problem List   Diagnosis    Angina pectoris ([address])    Essential hypertension    Migraines    AMD (age-related macular degeneration), bilateral    Allergic reaction    Gastroesophageal reflux disease without esophagitis    Allergic conjunctivitis of both eyes    Angio-edema    Paroxysmal atrial fibrillation (HCC)    Lumbar radiculopathy    CAMILLE (obstructive sleep apnea)    Hypertensive heart disease with congestive heart failure (HCC)    Unspecified diastolic (congestive) heart failure (HCC)    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HCC)    COPD (chronic obstructive pulmonary disease) ([address])    Urinary retention    Peripheral vascular disease ([address])    Appendix disease    Carotid artery stenosis    Stenosis of left carotid artery    Asymptomatic stenosis of left carotid artery    Hepatic steatosis    Partial facial nerve palsy    Injury of left facial nerve    Colitis    Chronic migraine w/o aura w/o status migrainosus, not intractable    IIH (idiopathic intracranial hypertension)    Hematochezia    Left carotid stenosis    Colonic ischemia (HCC)    Acute postoperative pain               07/27/23 1130   PT Last Visit   PT Visit Date 07/27/23   Note Type   Note Type Treatment   Pain Assessment   Pain Score No Pain   Restrictions/Precautions   Weight Bearing Precautions Per Order No   Other Precautions Multiple lines; Fall Risk;Telemetry   General   Chart Reviewed Yes   Response to Previous Treatment Patient with no complaints from previous session. Family/Caregiver Present No   Cognition   Overall Cognitive Status WFL   Arousal/Participation Alert; Cooperative   Attention Within functional limits   Orientation Level Oriented X4   Following Commands Follows multistep commands without difficulty   Comments pleasant & cooperative   Subjective   Subjective Pt agreeable to PT tx. Bed Mobility   Supine to Sit Unable to assess   Sit to Supine Unable to assess   Additional Comments Pt OOB, in the bathroom w/ OT upon my arrival & OOB in chair at end of session. Transfers   Sit to Stand 5  Supervision   Additional items Increased time required;Verbal cues;Armrests   Stand to Sit 5  Supervision   Additional items Increased time required;Verbal cues;Armrests   Toilet transfer 5  Supervision   Additional items Standard toilet; Increased time required   Additional Comments occasional cues for hand placement   Ambulation/Elevation   Gait pattern Wide MEÑO; Decreased foot clearance; Excessively slow   Gait Assistance 5  Supervision   Additional items Verbal cues   Assistive Device Rolling walker   Distance 120'x1   Ambulation/Elevation Additional Comments no gross LOB noted; improved amb tolerance compared to previous PT session   Balance   Static Sitting Fair +   Dynamic Sitting Fair   Static Standing Fair   Dynamic Standing Fair -   Ambulatory Fair -   Endurance Deficit   Endurance Deficit Yes   Endurance Deficit Description fatigue   Activity Tolerance   Activity Tolerance Patient limited by fatigue;Treatment limited secondary to medical complications (Comment)   Medical Staff Made Aware Go Albright   Nurse Made Aware yes   Exercises   Hip Flexion Sitting;10 reps;AROM; Bilateral   Hip Abduction Sitting;10 reps;AROM; Bilateral   Hip Adduction Sitting;10 reps;AROM; Bilateral   Knee AROM Long Arc Quad Sitting;10 reps;AROM; Bilateral   Ankle Pumps Sitting;10 reps;AROM; Bilateral   Assessment   Prognosis Good   Problem List Decreased strength;Decreased endurance; Impaired balance;Decreased mobility;Obesity   Assessment Pt seen for PT per POC. Improved mobility & activity tolerance noted this tx session. Pt progressed to S for transfers & amb w/ RW + cues for techniques & safety.  Gait deviations as above, slow w/ WBOS & dec foot clearance but no gross LOB noted. Improved amb tolerance compared to previous session. (+) fatigue after amb but relieved w/ rest. Pt tolerated above mentioned thera. ex well. Pt require cues for proper exercise techniques and performance. Pt also require rest period in between exercises 2* to fatigue. Please see flowsheet above for objective findings & levels of assistance required for all functional tasks during this tx session. No SOB & dizziness reported t/o session. Nsg staff most recent vital signs as follows: BP (!) 106/46 (BP Location: Left arm)   Pulse 72   Temp 97.7 °F (36.5 °C) (Temporal)   Resp 18   Ht 5' 4" (1.626 m)   Wt 87.2 kg (192 lb 3.9 oz)   LMP  (LMP Unknown)   SpO2 97%   BMI 33.00 kg/m² . Will continue PT per POC. At end of session, pt OOB in chair in stable condition, call bell & phone in reach, all lines intact. Fall precautions reinforced w/ good understanding. The patient's AM-PAC Basic Mobility Inpatient Short Form Raw Score is 18. A Raw score of greater than 16 suggests the patient may benefit from discharge to home. Please also refer to the recommendation of the Physical Therapist for safe discharge planning. From PT standpoint, pt may D/C home w/ HHPT vs STR pending progress & available support at home. CM to follow. Will recommend restorative for daily amb to prevent decline in function. Nsg staff to continue to mobilized pt (OOB in chair for all meals & ambulate in room/unit) as tolerated to inc activity level while in hospital. Nsg notified. Co-tx was necessary to complete this PT tx session for the pt's best interest given pt's medical acuity & complexity.    Goals   Patient Goals to get better & return home   STG Expiration Date 08/06/23   Short Term Goal #1 Goals to be met in 10 days; pt will be able to: 1) inc strength & balance by 1/2 grade to improve overall functional mobility & dec fall risk; 2) inc bed mobility to modified I for pt to be able to get in/OOB safely w/ proper techniques 100% of the time, to dec caregiver burden & safely function at home; 3) inc transfers to modified I for pt to transition safely from one surface to another w/o % of the time, to dec caregiver burden & safely function at home; 4) inc amb w/ RW approx. >350' w/ modified I for pt to ambulate community distances w/o any % of the time, to dec caregiver burden & safely function at home; 5) negotiate stairs w/ S for inc safety during stair mgt inside/outside of home & dec caregiver burden; 6) pt/caregiver ed   PT Treatment Day 2   Plan   Treatment/Interventions Functional transfer training;LE strengthening/ROM; Therapeutic exercise;Elevations; Endurance training;Patient/family training;Bed mobility;Gait training;Spoke to nursing;OT   Progress Progressing toward goals   PT Frequency 3-5x/wk   Recommendation   PT Discharge Recommendation Home with home health rehabilitation  (vs STR pending progress & available support at home)   Equipment Recommended [facility] Recommended Wheeled walker   Additional Comments restorative for daily amb   AM-PAC Basic Mobility Inpatient   Turning in Flat Bed Without Bedrails 3   Lying on Back to Sitting on Edge of Flat Bed Without Bedrails 3   Moving Bed to Chair 3   Standing Up From Chair Using Arms 3   Walk in Room 3   Climb 3-5 Stairs With Railing 3   Basic Mobility Inpatient Raw Score 18   Basic Mobility Standardized Score 41.05   Highest Level Of Mobility   JH-HLM Goal 6: Walk 10 steps or more   JH-HLM Achieved 7: Walk 25 feet or more   Education   Education Provided Mobility training;Home exercise program;Assistive device   Patient Demonstrates acceptance/verbal understanding   End of Consult   Patient Position at End of Consult Bedside chair; All needs within reach   End of Consult Comments Pt in stable condition. All needs in reach. All lines intact.    Josafat Banerjee

## 2023-07-27 NOTE — PLAN OF CARE
Problem: PAIN - ADULT  Goal: Verbalizes/displays adequate comfort level or baseline comfort level  Description: Interventions:  - Encourage patient to monitor pain and request assistance  - Assess pain using appropriate pain scale  - Administer analgesics based on type and severity of pain and evaluate response  - Implement non-pharmacological measures as appropriate and evaluate response  - Consider cultural and social influences on pain and pain management  - Notify physician/advanced practitioner if interventions unsuccessful or patient reports new pain  Outcome: Progressing     Problem: INFECTION - ADULT  Goal: Absence or prevention of progression during hospitalization  Description: INTERVENTIONS:  - Assess and monitor for signs and symptoms of infection  - Monitor lab/diagnostic results  - Monitor all insertion sites, i.e. indwelling lines, tubes, and drains  - Monitor endotracheal if appropriate and nasal secretions for changes in amount and color  - [place] appropriate cooling/warming therapies per order  - Administer medications as ordered  - Instruct and encourage patient and family to use good hand hygiene technique  - Identify and instruct in appropriate isolation precautions for identified infection/condition  Outcome: Progressing  Goal: Absence of fever/infection during neutropenic period  Description: INTERVENTIONS:  - Monitor WBC    Outcome: Progressing     Problem: SAFETY ADULT  Goal: Patient will remain free of falls  Description: INTERVENTIONS:  - Educate patient/family on patient safety including physical limitations  - Instruct patient to call for assistance with activity   - Consult OT/PT to assist with strengthening/mobility   - Keep Call bell within reach  - Keep bed low and locked with side rails adjusted as appropriate  - Keep care items and personal belongings within reach  - Initiate and maintain comfort rounds  - Make Fall Risk Sign visible to staff  - Offer Toileting every 2 Hours, in advance of need  - Initiate/Maintain bed alarm  - Obtain necessary fall risk management equipment:   - Apply yellow socks and bracelet for high fall risk patients  - Consider moving patient to room near nurses station  Outcome: Progressing  Goal: Maintain or return to baseline ADL function  Description: INTERVENTIONS:  -  Assess patient's ability to carry out ADLs; assess patient's baseline for ADL function and identify physical deficits which impact ability to perform ADLs (bathing, care of mouth/teeth, toileting, grooming, dressing, etc.)  - Assess/evaluate cause of self-care deficits   - Assess range of motion  - Assess patient's mobility; develop plan if impaired  - Assess patient's need for assistive devices and provide as appropriate  - Encourage maximum independence but intervene and supervise when necessary  - Involve family in performance of ADLs  - Assess for home care needs following discharge   - Consider OT consult to assist with ADL evaluation and planning for discharge  - Provide patient education as appropriate  Outcome: Progressing  Goal: Maintains/Returns to pre admission functional level  Description: INTERVENTIONS:  - Perform BMAT or MOVE assessment daily.   - Set and communicate daily mobility goal to care team and patient/family/caregiver. - Collaborate with rehabilitation services on mobility goals if consulted  - Perform Range of Motion 3 times a day. - Reposition patient every 3 hours.   - Dangle patient 3 times a day  - Stand patient 3 times a day  - Ambulate patient 3 times a day  - Out of bed to chair 3 times a day   - Out of bed for meals 3 times a day  - Out of bed for toileting  - Record patient progress and toleration of activity level   Outcome: Progressing     Problem: DISCHARGE PLANNING  Goal: Discharge to home or other facility with appropriate resources  Description: INTERVENTIONS:  - Identify barriers to discharge w/patient and caregiver  - Arrange for needed discharge resources and transportation as appropriate  - Identify discharge learning needs (meds, wound care, etc.)  - Arrange for interpretive services to assist at discharge as needed  - Refer to Case Management Department for coordinating discharge planning if the patient needs post-hospital services based on physician/advanced practitioner order or complex needs related to functional status, cognitive ability, or social support system  Outcome: Progressing     Problem: Knowledge Deficit  Goal: Patient/family/caregiver demonstrates understanding of disease process, treatment plan, medications, and discharge instructions  Description: Complete learning assessment and assess knowledge base.   Interventions:  - Provide teaching at level of understanding  - Provide teaching via preferred learning methods  Outcome: Progressing     Problem: Prexisting or High Potential for Compromised Skin Integrity  Goal: Skin integrity is maintained or improved  Description: INTERVENTIONS:  - Identify patients at risk for skin breakdown  - Assess and monitor skin integrity  - Assess and monitor nutrition and hydration status  - Monitor labs   - Assess for incontinence   - Turn and reposition patient  - Assist with mobility/ambulation  - Relieve pressure over bony prominences  - Avoid friction and shearing  - Provide appropriate hygiene as needed including keeping skin clean and dry  - Evaluate need for skin moisturizer/barrier cream  - Collaborate with interdisciplinary team   - Patient/family teaching  - Consider wound care consult   Outcome: Progressing     Problem: MOBILITY - ADULT  Goal: Maintain or return to baseline ADL function  Description: INTERVENTIONS:  -  Assess patient's ability to carry out ADLs; assess patient's baseline for ADL function and identify physical deficits which impact ability to perform ADLs (bathing, care of mouth/teeth, toileting, grooming, dressing, etc.)  - Assess/evaluate cause of self-care deficits   - Assess range of motion  - Assess patient's mobility; develop plan if impaired  - Assess patient's need for assistive devices and provide as appropriate  - Encourage maximum independence but intervene and supervise when necessary  - Involve family in performance of ADLs  - Assess for home care needs following discharge   - Consider OT consult to assist with ADL evaluation and planning for discharge  - Provide patient education as appropriate  Outcome: Progressing  Goal: Maintains/Returns to pre admission functional level  Description: INTERVENTIONS:  - Perform BMAT or MOVE assessment daily.   - Set and communicate daily mobility goal to care team and patient/family/caregiver. - Collaborate with rehabilitation services on mobility goals if consulted  - Perform Range of Motion 3 times a day. - Reposition patient every 3 hours. - Dangle patient 3 times a day  - Stand patient 3 times a day  - Ambulate patient 3 times a day  - Out of bed to chair 3 times a day   - Out of bed for meals 3 times a day  - Out of bed for toileting  - Record patient progress and toleration of activity level   Outcome: Progressing     Problem: Nutrition/Hydration-ADULT  Goal: Nutrient/Hydration intake appropriate for improving, restoring or maintaining nutritional needs  Description: Monitor and assess patient's nutrition/hydration status for malnutrition. Collaborate with interdisciplinary team and initiate plan and interventions as ordered. Monitor patient's weight and dietary intake as ordered or per policy. Utilize nutrition screening tool and intervene as necessary. Determine patient's food preferences and provide high-protein, high-caloric foods as appropriate.      INTERVENTIONS:  - Monitor oral intake, urinary output, labs, and treatment plans  - Assess nutrition and hydration status and recommend course of action  - Evaluate amount of meals eaten  - Assist patient with eating if necessary   - Allow adequate time for meals  - Recommend/ encourage appropriate diets, oral nutritional supplements, and vitamin/mineral supplements  - Order, calculate, and assess calorie counts as needed  - Recommend, monitor, and adjust tube feedings and TPN/PPN based on assessed needs  - Assess need for intravenous fluids  - Provide specific nutrition/hydration education as appropriate  - Include patient/family/caregiver in decisions related to nutrition  Outcome: Progressing

## 2023-07-27 NOTE — PLAN OF CARE
Problem: PAIN - ADULT  Goal: Verbalizes/displays adequate comfort level or baseline comfort level  Description: Interventions:  - Encourage patient to monitor pain and request assistance  - Assess pain using appropriate pain scale  - Administer analgesics based on type and severity of pain and evaluate response  - Implement non-pharmacological measures as appropriate and evaluate response  - Consider cultural and social influences on pain and pain management  - Notify physician/advanced practitioner if interventions unsuccessful or patient reports new pain  Outcome: Progressing     Problem: INFECTION - ADULT  Goal: Absence or prevention of progression during hospitalization  Description: INTERVENTIONS:  - Assess and monitor for signs and symptoms of infection  - Monitor lab/diagnostic results  - Monitor all insertion sites, i.e. indwelling lines, tubes, and drains  - Monitor endotracheal if appropriate and nasal secretions for changes in amount and color  - [place] appropriate cooling/warming therapies per order  - Administer medications as ordered  - Instruct and encourage patient and family to use good hand hygiene technique  - Identify and instruct in appropriate isolation precautions for identified infection/condition  Outcome: Progressing  Goal: Absence of fever/infection during neutropenic period  Description: INTERVENTIONS:  - Monitor WBC    Outcome: Progressing     Problem: SAFETY ADULT  Goal: Patient will remain free of falls  Description: INTERVENTIONS:  - Educate patient/family on patient safety including physical limitations  - Instruct patient to call for assistance with activity   - Consult OT/PT to assist with strengthening/mobility   - Keep Call bell within reach  - Keep bed low and locked with side rails adjusted as appropriate  - Keep care items and personal belongings within reach  - Initiate and maintain comfort rounds  - Make Fall Risk Sign visible to staff  - Offer Toileting every  Hours, in advance of need  - Initiate/Maintain alarm  - Obtain necessary fall risk management equipment:   - Apply yellow socks and bracelet for high fall risk patients  - Consider moving patient to room near nurses station  Outcome: Progressing  Goal: Maintain or return to baseline ADL function  Description: INTERVENTIONS:  -  Assess patient's ability to carry out ADLs; assess patient's baseline for ADL function and identify physical deficits which impact ability to perform ADLs (bathing, care of mouth/teeth, toileting, grooming, dressing, etc.)  - Assess/evaluate cause of self-care deficits   - Assess range of motion  - Assess patient's mobility; develop plan if impaired  - Assess patient's need for assistive devices and provide as appropriate  - Encourage maximum independence but intervene and supervise when necessary  - Involve family in performance of ADLs  - Assess for home care needs following discharge   - Consider OT consult to assist with ADL evaluation and planning for discharge  - Provide patient education as appropriate  Outcome: Progressing  Goal: Maintains/Returns to pre admission functional level  Description: INTERVENTIONS:  - Perform BMAT or MOVE assessment daily.   - Set and communicate daily mobility goal to care team and patient/family/caregiver. - Collaborate with rehabilitation services on mobility goals if consulted  - Perform Range of Motion  times a day. - Reposition patient every  hours.   - Dangle patient  times a day  - Stand patient  times a day  - Ambulate patient  times a day  - Out of bed to chair  times a day   - Out of bed for meals  times a day  - Out of bed for toileting  - Record patient progress and toleration of activity level   Outcome: Progressing     Problem: DISCHARGE PLANNING  Goal: Discharge to home or other facility with appropriate resources  Description: INTERVENTIONS:  - Identify barriers to discharge w/patient and caregiver  - Arrange for needed discharge resources and transportation as appropriate  - Identify discharge learning needs (meds, wound care, etc.)  - Arrange for interpretive services to assist at discharge as needed  - Refer to Case Management Department for coordinating discharge planning if the patient needs post-hospital services based on physician/advanced practitioner order or complex needs related to functional status, cognitive ability, or social support system  Outcome: Progressing     Problem: Knowledge Deficit  Goal: Patient/family/caregiver demonstrates understanding of disease process, treatment plan, medications, and discharge instructions  Description: Complete learning assessment and assess knowledge base.   Interventions:  - Provide teaching at level of understanding  - Provide teaching via preferred learning methods  Outcome: Progressing     Problem: Prexisting or High Potential for Compromised Skin Integrity  Goal: Skin integrity is maintained or improved  Description: INTERVENTIONS:  - Identify patients at risk for skin breakdown  - Assess and monitor skin integrity  - Assess and monitor nutrition and hydration status  - Monitor labs   - Assess for incontinence   - Turn and reposition patient  - Assist with mobility/ambulation  - Relieve pressure over bony prominences  - Avoid friction and shearing  - Provide appropriate hygiene as needed including keeping skin clean and dry  - Evaluate need for skin moisturizer/barrier cream  - Collaborate with interdisciplinary team   - Patient/family teaching  - Consider wound care consult   Outcome: Progressing     Problem: MOBILITY - ADULT  Goal: Maintain or return to baseline ADL function  Description: INTERVENTIONS:  -  Assess patient's ability to carry out ADLs; assess patient's baseline for ADL function and identify physical deficits which impact ability to perform ADLs (bathing, care of mouth/teeth, toileting, grooming, dressing, etc.)  - Assess/evaluate cause of self-care deficits   - Assess range of motion  - Assess patient's mobility; develop plan if impaired  - Assess patient's need for assistive devices and provide as appropriate  - Encourage maximum independence but intervene and supervise when necessary  - Involve family in performance of ADLs  - Assess for home care needs following discharge   - Consider OT consult to assist with ADL evaluation and planning for discharge  - Provide patient education as appropriate  Outcome: Progressing  Goal: Maintains/Returns to pre admission functional level  Description: INTERVENTIONS:  - Perform BMAT or MOVE assessment daily.   - Set and communicate daily mobility goal to care team and patient/family/caregiver. - Collaborate with rehabilitation services on mobility goals if consulted  - Perform Range of Motion  times a day. - Reposition patient every  hours. - Dangle patient  times a day  - Stand patient  times a day  - Ambulate patient  times a day  - Out of bed to chair  times a day   - Out of bed for meals  times a day  - Out of bed for toileting  - Record patient progress and toleration of activity level   Outcome: Progressing     Problem: Nutrition/Hydration-ADULT  Goal: Nutrient/Hydration intake appropriate for improving, restoring or maintaining nutritional needs  Description: Monitor and assess patient's nutrition/hydration status for malnutrition. Collaborate with interdisciplinary team and initiate plan and interventions as ordered. Monitor patient's weight and dietary intake as ordered or per policy. Utilize nutrition screening tool and intervene as necessary. Determine patient's food preferences and provide high-protein, high-caloric foods as appropriate.      INTERVENTIONS:  - Monitor oral intake, urinary output, labs, and treatment plans  - Assess nutrition and hydration status and recommend course of action  - Evaluate amount of meals eaten  - Assist patient with eating if necessary   - Allow adequate time for meals  - Recommend/ encourage appropriate diets, oral nutritional supplements, and vitamin/mineral supplements  - Order, calculate, and assess calorie counts as needed  - Recommend, monitor, and adjust tube feedings and TPN/PPN based on assessed needs  - Assess need for intravenous fluids  - Provide specific nutrition/hydration education as appropriate  - Include patient/family/caregiver in decisions related to nutrition  Outcome: Progressing

## 2023-07-27 NOTE — ASSESSMENT & PLAN NOTE
Stable without exacerbation  Continue Breo 1 puff daily   Continue albuterol as needed  Encourage ambulation as tolerated

## 2023-07-27 NOTE — PROGRESS NOTES
[facility]  Progress Note  Name: Radha Abraham  MRN: [id number]  Unit/Bed#: E4 -01 I Date of Admission: 7/18/2023   Date of Service: 7/27/2023 I Hospital Day: 8    Assessment/Plan   * Colonic ischemia [facility])  Assessment & Plan  · Status post laparotomy, colon resection, anastomosis on 7/19/2023  · Intraoperatively, she was found to have ischemia of the transverse, distal and sigmoid colon  · Repeat ct on 7/24/23 without bowel leak or suspicious collection  · Postop management, diet, pain control per primary service    Paroxysmal atrial fibrillation ([address])  Assessment & Plan  • Now in SR with controlled rate   • Continue current regimen with amiodarone, Eliquis and Cardizem    COPD (chronic obstructive pulmonary disease) ([address])  Assessment & Plan  Stable without exacerbation  Continue Breo 1 puff daily   Continue albuterol as needed  Encourage ambulation as tolerated    Essential hypertension  Assessment & Plan  · Currently tolerating combination diltiazem and metoprolol. Left carotid stenosis  Assessment & Plan  · Managed medically with aspirin and anticoagulation  · Continue Eliquis and aspirin  Background:  · Pt was admitted 9/22 on the stroke pathway:  MRI negative and sx were attributed to migrane and discharged on ASA and eliquis  · She was diagnosed with left ICA stenosis 80% and underwent unsuccessful CEA with subsequent left facial numbness and left facial asymmetry  · Admitted 2/23 with left facial droop and BUE paresthesias with negative stroke work up that admission      IIH (idiopathic intracranial hypertension)  Assessment & Plan  · Followed by Neurology  · Suspected to be exacerbated by untreated CAMILLE:  Referred for sleep study   · Currently on furosemide , stable         Spoke with Dr. Hanny Vargas. She is cleared to go to rehab from their standpoint  No objections to rehab placement as well.       VTE Pharmacologic Prophylaxis: VTE Score: 3 Moderate Risk (Score 3-4) - Pharmacological DVT Prophylaxis Ordered: apixaban (Eliquis). Current Length of Stay: 8 day(s)  Current Patient Status: Inpatient   Certification Statement: The patient will continue to require additional inpatient hospital stay due to Rehab placement  Discharge Plan: Possible DC to rehab tomorrow    Code Status: Level 1 - Full Code    Subjective:   She finally had a bowel movement. Pain is better  Breathing is better  She says she also ambulated more    Objective:     Vitals:   Temp (24hrs), Av.1 °F (36.2 °C), Min:96.6 °F (35.9 °C), Max:97.7 °F (36.5 °C)    Temp:  [96.6 °F (35.9 °C)-97.7 °F (36.5 °C)] (P) 96.6 °F (35.9 °C)  HR:  [69-80] (P) 74  Resp:  [18] (P) 18  BP: (106-142)/(46-69) (P) 143/65  SpO2:  [92 %-97 %] (P) 93 %  Body mass index is 33 kg/m². Input and Output Summary (last 24 hours): Intake/Output Summary (Last 24 hours) at 2023 1718  Last data filed at 2023 8592  Gross per 24 hour   Intake --   Output 1100 ml   Net -1100 ml       Physical Exam:   Physical Exam  Vitals reviewed. Constitutional:       Appearance: She is obese. She is not ill-appearing. HENT:      Head: Normocephalic and atraumatic. Nose: No congestion or rhinorrhea. Eyes:      General: No scleral icterus. Cardiovascular:      Rate and Rhythm: Regular rhythm. Pulmonary:      Breath sounds: No wheezing, rhonchi or rales. Abdominal:      General: Abdomen is flat. Palpations: Abdomen is soft. Musculoskeletal:      Right lower leg: No edema. Left lower leg: No edema. Neurological:      Mental Status: She is oriented to person, place, and time.    Psychiatric:         Mood and Affect: Mood normal.         Behavior: Behavior normal.       Additional Data:     Labs:  Results from last 7 days   Lab Units 23  0455 23  0524 23  0348   WBC Thousand/uL 7.69 8.39 8.20   HEMOGLOBIN g/dL 8.5* 8.2* 8.5*   HEMATOCRIT % 27.6* 25.9* 26.2*   PLATELETS Thousands/uL 314 267 221   BANDS PCT %  --  1  --    NEUTROS PCT %  --   --  66   LYMPHS PCT %  --   --  16   LYMPHO PCT %  --  20  --    MONOS PCT %  --   --  9   MONO PCT %  --  6  --    EOS PCT %  --  4 6     Results from last 7 days   Lab Units 07/27/23  0455   SODIUM mmol/L 140   POTASSIUM mmol/L 3.4*   CHLORIDE mmol/L 101   CO2 mmol/L 31   BUN mg/dL 7   CREATININE mg/dL 0.61   ANION GAP mmol/L 8   CALCIUM mg/dL 8.4   GLUCOSE RANDOM mg/dL 91     Results from last 7 days   Lab Units 07/21/23  0559   INR  1.49*     Results from last 7 days   Lab Units 07/23/23  0513   POC GLUCOSE mg/dl 77               Lines/Drains:  Invasive Devices     Peripherally Inserted Central Catheter Line  Duration           PICC Line 40/01/18 Right Basilic 3 days          Drain  Duration           External Urinary Catheter 4 days                           Imaging: No pertinent imaging reviewed.     Recent Cultures (last 7 days):         Last 24 Hours Medication List:   Current Facility-Administered Medications   Medication Dose Route Frequency Provider Last Rate   • acetaminophen  650 mg Oral Q6H [address] MD     • aluminum-magnesium hydroxide-simethicone  30 mL Oral Q6H PRN Kishan Castano MD     • amiodarone  400 mg Oral BID With Meals Jaky Mendoza MD     • apixaban  5 mg Oral BID Fredrick Sweeney MD     • aspirin  81 mg Oral Daily Fredrick Sweeney MD     • calcium gluconate  2 g Intravenous Once Fredrick Sweeney MD     • chlorhexidine  15 mL Mouth/Throat Q12H [address] Kishan Castano MD     • diltiazem  120 mg Oral Daily Jaky Mendoza MD     • diltiazem  10 mg Intravenous Q3H PRN Murtaza Garnett PA-C     • diphenhydrAMINE  25 mg Intravenous HS PRN Lilly Rangel CRNP     • famotidine  20 mg Oral HS Kishan Castano MD     • fluticasone-vilanterol  1 puff Inhalation Daily Kishan Castano MD     • furosemide  40 mg Oral BID (diuretic) Jaky Mendoza MD     • LORazepam  1 mg Oral TID PRN Lilly Rangel, CRNP     • methocarbamol  500 mg Oral Q6H [address] Salvatore John MD     • metoprolol  10 mg Intravenous Q6H PRN Salvatore John MD     • metoprolol tartrate  100 mg Oral Q12H [address] Salvatore John MD     • mineral oil  1 enema Rectal Once Ethan Simpson MD     • morphine injection  4 mg Intravenous Q2H PRN JUD Eli     • naloxone  0.04 mg Intravenous Q1MIN PRN Viktoria Murray MD     • ondansetron  4 mg Intravenous Q6H PRN Salvatore John MD     • oxyCODONE  10 mg Oral Q4H PRN Viktoria Murray MD     • oxyCODONE  5 mg Oral Q4H PRN Viktoria Murray MD     • potassium chloride  20 mEq Oral TID With Meals Jose M Singh MD     • sucralfate  1 g Oral 4x Daily [facility] ) Salvatore John MD          Today, Patient Was Seen By: Danitza Bernard MD    **Please Note: This note may have been constructed using a voice recognition system. **

## 2023-07-27 NOTE — PROGRESS NOTES
Progress Note - Cardiology   Evert Urean 70 y.o. female MRN: [id number]  Unit/Bed#: E4 -01 Encounter: [id number]      Assessment/Recommendations/Discussion:   • Paroxysmal atrial fibrillation, now sinus  • Tachybradycardia syndrome  • Ischemic colitis, now status post hemicolectomy with primary anastomosis July 2023  • Mild to moderate aortic regurgitation  • COPD  • History of intracranial hemorrhage, idiopathic, chronic migraines  • Primary hypertension  • Carotid artery stenosis, left  •            PLAN  • She appears euvolemic today  • Heart rate is controlled rhythm controlled  • Continue with p.o. amiodarone 400 mg twice a day x1 week, then would switch to 200 mg daily continue with p.o. Cardizem 120 mg, continue with metoprolol 100 mg twice a day  • Continue with potassium supplementation. If blood pressure elevated, will restart spironolactone    Subjective:   HPI  Doing well, no palpitations or chest pain      Review of Systems: As noted in HPI. Rest of ROS is negative. Vitals:   BP (!) 106/46 (BP Location: Left arm)   Pulse 72   Temp 97.7 °F (36.5 °C) (Temporal)   Resp 18   Ht 5' 4" (1.626 m)   Wt 87.2 kg (192 lb 3.9 oz)   LMP  (LMP Unknown)   SpO2 97%   BMI 33.00 kg/m²   I/O       07/25 0701 07/26 0700 07/26 0701 07/27 0700 07/27 0701 07/28 0700    P. O. Total Intake(mL/kg)       Urine (mL/kg/hr) 1500 (0.7) 1100 (0.5)     Total Output 1500 1100     Net -1500 -1100                Weight (last 2 days)     Date/Time Weight    07/27/23 0600 87.2 (192.24)    07/26/23 0600 88.9 (195.99)    07/25/23 0600 87 (191.8)          Physical Exam   Constitutional: awake, alert and oriented, in no acute distress, no obvious deformities, obese female  Head: Normocephalic, without obvious abnormality, atraumatic  Eyes: conjunctivae clear and moist. Sclera anicteric. No xanthelasmas. Pupils equal bilaterally. Extraocular motions are full.   Ear nose mouth and throat: ears are symmetrical bilaterally, hearing appears to be equal bilaterally, no nasal discharge or epistaxis, oropharynx is clear with moist mucous membranes  Neck: Trachea is midline, neck is supple, no thyromegaly or significant lymphadenopathy, there is full range of motion. Lungs: clear to auscultation bilaterally, no wheezes, no rales, no rhonchi, no accessory muscle use, breathing is nonlabored  Heart: regular rate and rhythm, S1, S2 normal, no murmur, no click, no rub and no gallop, no lower extremity edema  Abdomen: Obese, soft, mildly tender to palpation; bowel sounds normal; no masses, no organomegaly  Psychiatric: Patient is oriented to time, place, person, mood/affect is negative for depression, anxiety, agitation, appears to have appropriate insight  Skin: Skin is warm, dry, intact. No obvious rashes or lesions on exposed extremities. Nail beds are pink with no cyanosis or clubbing.       Lab Results:  Results from last 7 days   Lab Units 07/27/23  0455   WBC Thousand/uL 7.69   HEMOGLOBIN g/dL 8.5*   HEMATOCRIT % 27.6*   PLATELETS Thousands/uL 314     Results from last 7 days   Lab Units 07/27/23  0455   POTASSIUM mmol/L 3.4*   CHLORIDE mmol/L 101   CO2 mmol/L 31   BUN mg/dL 7   CREATININE mg/dL 0.61   CALCIUM mg/dL 8.4     Results from last 7 days   Lab Units 07/27/23  0455   POTASSIUM mmol/L 3.4*   CHLORIDE mmol/L 101   CO2 mmol/L 31   BUN mg/dL 7   CREATININE mg/dL 0.61   CALCIUM mg/dL 8.4           Medications:    Current Facility-Administered Medications:   •  acetaminophen (TYLENOL) tablet 650 mg, 650 mg, Oral, Q6H [address], Eleazar Monsalve MD, 650 mg at 07/27/23 1125  •  aluminum-magnesium hydroxide-simethicone (MAALOX) oral suspension 30 mL, 30 mL, Oral, Q6H PRN, Eleazar Monsalve MD, 30 mL at 07/25/23 1727  •  amiodarone tablet 400 mg, 400 mg, Oral, BID With Meals, Karen Alejandro MD, 400 mg at 07/27/23 1677  •  apixaban (ELIQUIS) tablet 5 mg, 5 mg, Oral, BID, Jose Stephen MD, 5 mg at 07/27/23 8407  •  aspirin (ECOTRIN LOW STRENGTH) EC tablet 81 mg, 81 mg, Oral, Daily, Simba Mora MD, 81 mg at 07/27/23 0805  •  calcium gluconate 2 g in sodium chloride 0.9% 100 mL (premix), 2 g, Intravenous, Once, Simba Mora MD  •  chlorhexidine (PERIDEX) 0.12 % oral rinse 15 mL, 15 mL, Mouth/Throat, Q12H [address], Lorraine Live MD, 15 mL at 07/27/23 0805  •  diltiazem (CARDIZEM CD) 24 hr capsule 120 mg, 120 mg, Oral, Daily, Sd Cano MD, 120 mg at 07/27/23 0804  •  diltiazem (CARDIZEM) injection 10 mg, 10 mg, Intravenous, Q3H PRN, Lori Garnett PA-C, 10 mg at 07/24/23 1944  •  diphenhydrAMINE (BENADRYL) injection 25 mg, 25 mg, Intravenous, HS PRN, JUD Prescott  •  famotidine (PEPCID) tablet 20 mg, 20 mg, Oral, HS, Lorraine Live MD, 20 mg at 07/26/23 2115  •  fluticasone-vilanterol 200-25 mcg/actuation 1 puff, 1 puff, Inhalation, Daily, Lorraine Live MD, 1 puff at 07/27/23 0807  •  furosemide (LASIX) tablet 40 mg, 40 mg, Oral, BID (diuretic), Sd Cano MD, 40 mg at 07/27/23 0805  •  LORazepam (ATIVAN) tablet 1 mg, 1 mg, Oral, TID PRN, JUD Prescott, 1 mg at 07/26/23 1156  •  methocarbamol (ROBAXIN) tablet 500 mg, 500 mg, Oral, Q6H [address], Lorraine Live MD, 500 mg at 07/27/23 1125  •  metoprolol (LOPRESSOR) injection 10 mg, 10 mg, Intravenous, Q6H PRN, Lorraine Live MD, 10 mg at 07/23/23 0329  •  metoprolol tartrate (LOPRESSOR) tablet 100 mg, 100 mg, Oral, Q12H [address], Lorraine Live MD, 100 mg at 07/27/23 1359  •  mineral oil enema 1 enema, 1 enema, Rectal, Once, Deedee Chacon MD  •  morphine injection 4 mg, 4 mg, Intravenous, Q2H PRN, JUD Prescott, 4 mg at 07/25/23 2131  •  naloxone (NARCAN) 0.04 mg/mL syringe 0.04 mg, 0.04 mg, Intravenous, Q1MIN PRN, Simba Mora MD  •  ondansetron [facility]) injection 4 mg, 4 mg, Intravenous, Q6H PRN, Lorraine Live MD, 4 mg at 07/27/23 0303  •  oxyCODONE (ROXICODONE) immediate release tablet 10 mg, 10 mg, Oral, Q4H PRN, Viktoria Murray MD, 10 mg at 07/27/23 0441  •  oxyCODONE (ROXICODONE) IR tablet 5 mg, 5 mg, Oral, Q4H PRN, Viktoria Murray MD, 5 mg at 07/26/23 2238  •  potassium chloride (K-DUR,KLOR-CON) CR tablet 20 mEq, 20 mEq, Oral, TID With Meals, Jose M Singh MD, 20 mEq at 07/27/23 1125  •  potassium chloride 20 mEq IVPB (premix), 20 mEq, Intravenous, Once, Jose M Singh MD, Last Rate: 50 mL/hr at 07/27/23 1106, 20 mEq at 07/27/23 1106  •  sucralfate (CARAFATE) tablet 1 g, 1 g, Oral, 4x Daily (AC & HS), Salvatore John MD, 1 g at 07/27/23 1125    Portions of the record may have been created with voice recognition software. Occasional wrong word or "sound a like" substitutions may have occurred due to the inherent limitations of voice recognition software. Read the chart carefully and recognize, using context, where substitutions have occurred.     Jesus Alberto Givens DO, [facility]  7/27/2023 12:59 PM

## 2023-07-27 NOTE — PLAN OF CARE
Problem: PAIN - ADULT  Goal: Verbalizes/displays adequate comfort level or baseline comfort level  Description: Interventions:  - Encourage patient to monitor pain and request assistance  - Assess pain using appropriate pain scale  - Administer analgesics based on type and severity of pain and evaluate response  - Implement non-pharmacological measures as appropriate and evaluate response  - Consider cultural and social influences on pain and pain management  - Notify physician/advanced practitioner if interventions unsuccessful or patient reports new pain  Outcome: Progressing     Problem: INFECTION - ADULT  Goal: Absence or prevention of progression during hospitalization  Description: INTERVENTIONS:  - Assess and monitor for signs and symptoms of infection  - Monitor lab/diagnostic results  - Monitor all insertion sites, i.e. indwelling lines, tubes, and drains  - Monitor endotracheal if appropriate and nasal secretions for changes in amount and color  - [place] appropriate cooling/warming therapies per order  - Administer medications as ordered  - Instruct and encourage patient and family to use good hand hygiene technique  - Identify and instruct in appropriate isolation precautions for identified infection/condition  Outcome: Progressing  Goal: Absence of fever/infection during neutropenic period  Description: INTERVENTIONS:  - Monitor WBC    Outcome: Progressing     Problem: SAFETY ADULT  Goal: Patient will remain free of falls  Description: INTERVENTIONS:  - Educate patient/family on patient safety including physical limitations  - Instruct patient to call for assistance with activity   - Consult OT/PT to assist with strengthening/mobility   - Keep Call bell within reach  - Keep bed low and locked with side rails adjusted as appropriate  - Keep care items and personal belongings within reach  - Initiate and maintain comfort rounds  - Make Fall Risk Sign visible to staff  - Offer Toileting every 2 Hours, in advance of need  - Initiate/Maintain bed alarm  - Obtain necessary fall risk management equipment: bed alarm   - Apply yellow socks and bracelet for high fall risk patients  - Consider moving patient to room near nurses station  Outcome: Progressing  Goal: Maintain or return to baseline ADL function  Description: INTERVENTIONS:  -  Assess patient's ability to carry out ADLs; assess patient's baseline for ADL function and identify physical deficits which impact ability to perform ADLs (bathing, care of mouth/teeth, toileting, grooming, dressing, etc.)  - Assess/evaluate cause of self-care deficits   - Assess range of motion  - Assess patient's mobility; develop plan if impaired  - Assess patient's need for assistive devices and provide as appropriate  - Encourage maximum independence but intervene and supervise when necessary  - Involve family in performance of ADLs  - Assess for home care needs following discharge   - Consider OT consult to assist with ADL evaluation and planning for discharge  - Provide patient education as appropriate  Outcome: Progressing  Goal: Maintains/Returns to pre admission functional level  Description: INTERVENTIONS:  - Perform BMAT or MOVE assessment daily.   - Set and communicate daily mobility goal to care team and patient/family/caregiver. - Collaborate with rehabilitation services on mobility goals if consulted  - Perform Range of Motion 4 times a day. - Reposition patient every 2 hours.   - Dangle patient 4 times a day  - Stand patient 4 times a day  - Ambulate patient 4 times a day  - Out of bed to chair 4 times a day   - Out of bed for meals 4 times a day  - Out of bed for toileting  - Record patient progress and toleration of activity level   Outcome: Progressing     Problem: DISCHARGE PLANNING  Goal: Discharge to home or other facility with appropriate resources  Description: INTERVENTIONS:  - Identify barriers to discharge w/patient and caregiver  - Arrange for needed discharge resources and transportation as appropriate  - Identify discharge learning needs (meds, wound care, etc.)  - Arrange for interpretive services to assist at discharge as needed  - Refer to Case Management Department for coordinating discharge planning if the patient needs post-hospital services based on physician/advanced practitioner order or complex needs related to functional status, cognitive ability, or social support system  Outcome: Progressing     Problem: Knowledge Deficit  Goal: Patient/family/caregiver demonstrates understanding of disease process, treatment plan, medications, and discharge instructions  Description: Complete learning assessment and assess knowledge base.   Interventions:  - Provide teaching at level of understanding  - Provide teaching via preferred learning methods  Outcome: Progressing     Problem: Prexisting or High Potential for Compromised Skin Integrity  Goal: Skin integrity is maintained or improved  Description: INTERVENTIONS:  - Identify patients at risk for skin breakdown  - Assess and monitor skin integrity  - Assess and monitor nutrition and hydration status  - Monitor labs   - Assess for incontinence   - Turn and reposition patient  - Assist with mobility/ambulation  - Relieve pressure over bony prominences  - Avoid friction and shearing  - Provide appropriate hygiene as needed including keeping skin clean and dry  - Evaluate need for skin moisturizer/barrier cream  - Collaborate with interdisciplinary team   - Patient/family teaching  - Consider wound care consult   Outcome: Progressing     Problem: MOBILITY - ADULT  Goal: Maintain or return to baseline ADL function  Description: INTERVENTIONS:  -  Assess patient's ability to carry out ADLs; assess patient's baseline for ADL function and identify physical deficits which impact ability to perform ADLs (bathing, care of mouth/teeth, toileting, grooming, dressing, etc.)  - Assess/evaluate cause of self-care deficits - Assess range of motion  - Assess patient's mobility; develop plan if impaired  - Assess patient's need for assistive devices and provide as appropriate  - Encourage maximum independence but intervene and supervise when necessary  - Involve family in performance of ADLs  - Assess for home care needs following discharge   - Consider OT consult to assist with ADL evaluation and planning for discharge  - Provide patient education as appropriate  Outcome: Progressing  Goal: Maintains/Returns to pre admission functional level  Description: INTERVENTIONS:  - Perform BMAT or MOVE assessment daily.   - Set and communicate daily mobility goal to care team and patient/family/caregiver. - Collaborate with rehabilitation services on mobility goals if consulted  - Perform Range of Motion 4 times a day. - Reposition patient every 2 hours. - Dangle patient 4 times a day  - Stand patient 4 times a day  - Ambulate patient 4 times a day  - Out of bed to chair 4 times a day   - Out of bed for meals 4 times a day  - Out of bed for toileting  - Record patient progress and toleration of activity level   Outcome: Progressing     Problem: Nutrition/Hydration-ADULT  Goal: Nutrient/Hydration intake appropriate for improving, restoring or maintaining nutritional needs  Description: Monitor and assess patient's nutrition/hydration status for malnutrition. Collaborate with interdisciplinary team and initiate plan and interventions as ordered. Monitor patient's weight and dietary intake as ordered or per policy. Utilize nutrition screening tool and intervene as necessary. Determine patient's food preferences and provide high-protein, high-caloric foods as appropriate.      INTERVENTIONS:  - Monitor oral intake, urinary output, labs, and treatment plans  - Assess nutrition and hydration status and recommend course of action  - Evaluate amount of meals eaten  - Assist patient with eating if necessary   - Allow adequate time for meals  - Recommend/ encourage appropriate diets, oral nutritional supplements, and vitamin/mineral supplements  - Order, calculate, and assess calorie counts as needed  - Recommend, monitor, and adjust tube feedings and TPN/PPN based on assessed needs  - Assess need for intravenous fluids  - Provide specific nutrition/hydration education as appropriate  - Include patient/family/caregiver in decisions related to nutrition  Outcome: Progressing

## 2023-07-28 ENCOUNTER — HOME HEALTH ADMISSION (OUTPATIENT)
Dept: HOME HEALTH SERVICES | Facility: HOME HEALTHCARE | Age: 63
End: 2023-07-28
Payer: COMMERCIAL

## 2023-07-28 VITALS
DIASTOLIC BLOOD PRESSURE: 72 MMHG | HEART RATE: 94 BPM | RESPIRATION RATE: 18 BRPM | BODY MASS INDEX: 32.66 KG/M2 | TEMPERATURE: 97 F | HEIGHT: 64 IN | WEIGHT: 191.3 LBS | OXYGEN SATURATION: 94 % | SYSTOLIC BLOOD PRESSURE: 174 MMHG

## 2023-07-28 LAB
ANION GAP SERPL CALCULATED.3IONS-SCNC: 6 MMOL/L
BUN SERPL-MCNC: 8 MG/DL (ref 5–25)
CALCIUM SERPL-MCNC: 8.9 MG/DL (ref 8.4–10.2)
CHLORIDE SERPL-SCNC: 99 MMOL/L (ref 96–108)
CO2 SERPL-SCNC: 31 MMOL/L (ref 21–32)
CREAT SERPL-MCNC: 0.77 MG/DL (ref 0.6–1.3)
GFR SERPL CREATININE-BSD FRML MDRD: 81 ML/MIN/1.73SQ M
GLUCOSE SERPL-MCNC: 100 MG/DL (ref 65–140)
MAGNESIUM SERPL-MCNC: 2 MG/DL (ref 1.9–2.7)
POTASSIUM SERPL-SCNC: 3.8 MMOL/L (ref 3.5–5.3)
SODIUM SERPL-SCNC: 136 MMOL/L (ref 135–147)

## 2023-07-28 PROCEDURE — 83735 ASSAY OF MAGNESIUM: CPT | Performed by: SURGERY

## 2023-07-28 PROCEDURE — 99024 POSTOP FOLLOW-UP VISIT: CPT | Performed by: PHYSICIAN ASSISTANT

## 2023-07-28 PROCEDURE — 99024 POSTOP FOLLOW-UP VISIT: CPT | Performed by: SURGERY

## 2023-07-28 PROCEDURE — 80048 BASIC METABOLIC PNL TOTAL CA: CPT | Performed by: SURGERY

## 2023-07-28 RX ORDER — MULTIVIT WITH MINERALS/LUTEIN
1000 TABLET ORAL DAILY
Refills: 0 | Status: CANCELLED | OUTPATIENT
Start: 2023-07-28

## 2023-07-28 RX ORDER — AMIODARONE HYDROCHLORIDE 200 MG/1
200 TABLET ORAL DAILY
Qty: 30 TABLET | Refills: 0
Start: 2023-08-01

## 2023-07-28 RX ORDER — OXYCODONE HYDROCHLORIDE 5 MG/1
5 TABLET ORAL EVERY 6 HOURS PRN
Qty: 15 TABLET | Refills: 0 | Status: SHIPPED | OUTPATIENT
Start: 2023-07-28 | End: 2023-07-28 | Stop reason: SDUPTHER

## 2023-07-28 RX ORDER — POTASSIUM CHLORIDE 20 MEQ/1
20 TABLET, EXTENDED RELEASE ORAL
Qty: 90 TABLET | Refills: 0
Start: 2023-07-28 | End: 2023-07-29

## 2023-07-28 RX ORDER — METOPROLOL TARTRATE 100 MG/1
100 TABLET ORAL EVERY 12 HOURS SCHEDULED
Qty: 60 TABLET | Refills: 0
Start: 2023-07-28 | End: 2023-07-29

## 2023-07-28 RX ORDER — ACETAMINOPHEN 325 MG/1
650 TABLET ORAL EVERY 6 HOURS SCHEDULED
Refills: 0
Start: 2023-07-28

## 2023-07-28 RX ORDER — POTASSIUM CHLORIDE 20 MEQ/1
20 TABLET, EXTENDED RELEASE ORAL
Qty: 90 TABLET | Refills: 0
Start: 2023-07-28 | End: 2023-07-28 | Stop reason: SDUPTHER

## 2023-07-28 RX ORDER — DILTIAZEM HYDROCHLORIDE 120 MG/1
120 CAPSULE, COATED, EXTENDED RELEASE ORAL DAILY
Qty: 30 CAPSULE | Refills: 0
Start: 2023-07-28

## 2023-07-28 RX ORDER — METOPROLOL TARTRATE 100 MG/1
100 TABLET ORAL EVERY 12 HOURS SCHEDULED
Qty: 60 TABLET | Refills: 0
Start: 2023-07-28 | End: 2023-07-28 | Stop reason: SDUPTHER

## 2023-07-28 RX ORDER — OXYCODONE HYDROCHLORIDE 5 MG/1
5 TABLET ORAL EVERY 6 HOURS PRN
Qty: 15 TABLET | Refills: 0 | Status: SHIPPED | OUTPATIENT
Start: 2023-07-28 | End: 2023-07-31

## 2023-07-28 RX ORDER — AMIODARONE HYDROCHLORIDE 400 MG/1
400 TABLET ORAL 2 TIMES DAILY
Qty: 6 TABLET | Refills: 0
Start: 2023-07-28 | End: 2023-07-31

## 2023-07-28 RX ORDER — DILTIAZEM HYDROCHLORIDE 120 MG/1
120 CAPSULE, COATED, EXTENDED RELEASE ORAL DAILY
Qty: 30 CAPSULE | Refills: 0
Start: 2023-07-28 | End: 2023-07-28 | Stop reason: SDUPTHER

## 2023-07-28 RX ORDER — AMIODARONE HYDROCHLORIDE 400 MG/1
400 TABLET ORAL 2 TIMES DAILY
Qty: 6 TABLET | Refills: 0
Start: 2023-07-28 | End: 2023-07-28 | Stop reason: SDUPTHER

## 2023-07-28 RX ADMIN — FUROSEMIDE 40 MG: 40 TABLET ORAL at 09:09

## 2023-07-28 RX ADMIN — ASPIRIN 81 MG: 81 TABLET, COATED ORAL at 09:08

## 2023-07-28 RX ADMIN — OXYCODONE HYDROCHLORIDE 5 MG: 5 TABLET ORAL at 04:55

## 2023-07-28 RX ADMIN — METOPROLOL TARTRATE 100 MG: 100 TABLET, FILM COATED ORAL at 09:08

## 2023-07-28 RX ADMIN — METHOCARBAMOL 500 MG: 500 TABLET ORAL at 17:08

## 2023-07-28 RX ADMIN — APIXABAN 5 MG: 5 TABLET, FILM COATED ORAL at 09:08

## 2023-07-28 RX ADMIN — METHOCARBAMOL 500 MG: 500 TABLET ORAL at 12:17

## 2023-07-28 RX ADMIN — FUROSEMIDE 40 MG: 40 TABLET ORAL at 17:08

## 2023-07-28 RX ADMIN — OXYCODONE HYDROCHLORIDE 10 MG: 10 TABLET ORAL at 00:42

## 2023-07-28 RX ADMIN — POTASSIUM CHLORIDE 20 MEQ: 1500 TABLET, EXTENDED RELEASE ORAL at 09:09

## 2023-07-28 RX ADMIN — DILTIAZEM HYDROCHLORIDE 120 MG: 120 CAPSULE, COATED, EXTENDED RELEASE ORAL at 09:09

## 2023-07-28 RX ADMIN — ACETAMINOPHEN 325MG 650 MG: 325 TABLET ORAL at 17:09

## 2023-07-28 RX ADMIN — CHLORHEXIDINE GLUCONATE 15 ML: 1.2 RINSE ORAL at 09:09

## 2023-07-28 RX ADMIN — OXYCODONE HYDROCHLORIDE 5 MG: 5 TABLET ORAL at 09:15

## 2023-07-28 RX ADMIN — SUCRALFATE 1 G: 1 TABLET ORAL at 12:17

## 2023-07-28 RX ADMIN — AMIODARONE HYDROCHLORIDE 400 MG: 200 TABLET ORAL at 17:09

## 2023-07-28 RX ADMIN — OXYCODONE HYDROCHLORIDE 5 MG: 5 TABLET ORAL at 13:30

## 2023-07-28 RX ADMIN — POTASSIUM CHLORIDE 20 MEQ: 1500 TABLET, EXTENDED RELEASE ORAL at 12:17

## 2023-07-28 RX ADMIN — ACETAMINOPHEN 325MG 650 MG: 325 TABLET ORAL at 05:50

## 2023-07-28 RX ADMIN — AMIODARONE HYDROCHLORIDE 400 MG: 200 TABLET ORAL at 09:08

## 2023-07-28 RX ADMIN — FLUTICASONE FUROATE AND VILANTEROL TRIFENATATE 1 PUFF: 200; 25 POWDER RESPIRATORY (INHALATION) at 09:09

## 2023-07-28 RX ADMIN — APIXABAN 5 MG: 5 TABLET, FILM COATED ORAL at 17:08

## 2023-07-28 RX ADMIN — METHOCARBAMOL 500 MG: 500 TABLET ORAL at 05:50

## 2023-07-28 RX ADMIN — ACETAMINOPHEN 325MG 650 MG: 325 TABLET ORAL at 12:17

## 2023-07-28 RX ADMIN — ONDANSETRON 4 MG: 2 INJECTION INTRAMUSCULAR; INTRAVENOUS at 00:42

## 2023-07-28 RX ADMIN — SUCRALFATE 1 G: 1 TABLET ORAL at 05:50

## 2023-07-28 NOTE — PROGRESS NOTES
Progress Note- General Surgery  Blanka Fulton 70 y.o. female MRN: [id number]  Unit/Bed#: E4 -01 Encounter: [id number]    Assessment:  70 F w/ pmh of HTN, HLD, afib, CAD, COPD, SMA thrombosis s/p stent presents with abdominal pain, hematochezia and c/f ischemic colitis. Now s/p diagnostic lap converted to ex lap with extended left hemicolectomy and primary anastomosis 7/19       Plan:  • Lo res diet, ensures  • Continue doac  • Appreciate Cards recs  • Appreciate slim recs  • F/u electrolytes, d/c on supplements  • PT/OT-post acute rehab  • dispo planning , possible d/c today if medical/cardiac clearance  • Please TigerText on call [facility] surgery resident or AP with any questions     Subjective/Objective     Subjective:   No acute events overnight. Tolerating diet. With BM. Has been OOB    Pertinent review of systems as above. All other review of systems negative. Objective:    Blood pressure 139/63, pulse 79, temperature (!) 97 °F (36.1 °C), temperature source Temporal, resp. rate 18, height 5' 4" (1.626 m), weight 87.2 kg (192 lb 3.9 oz), SpO2 94 %, not currently breastfeeding. ,Body mass index is 33 kg/m². No intake or output data in the 24 hours ending 07/28/23 0616    Invasive Devices     Peripherally Inserted Central Catheter Line  Duration           PICC Line 71/42/48 Right Basilic 3 days          Drain  Duration           External Urinary Catheter 5 days                Physical Exam:   Gen:  NAD. HEENT: NCAT. MMM. CV: well perfused. Lungs: Normal respiratory effort  Abd: soft, nt/nd,incisions CDI  Skin: warm/ dry. Extremities: no peripheral edema, no clubbing or cyanosis  Neuro: AxO x3. I have personally reviewed pertinent films in PACS.

## 2023-07-28 NOTE — CASE MANAGEMENT
Case Management Discharge Planning Note    Patient name Abiodun Cunningham  Location [address] [address] [id number] [place] [address] [place] [address] [place] [address]-* MRN [id number]  : 1960 Date 2023       Current Admission Date: 2023  Current Admission Diagnosis:Colonic ischemia [facility])   Patient Active Problem List    Diagnosis Date Noted   • Acute postoperative pain 2023   • Colitis 2023   • Chronic migraine w/o aura w/o status migrainosus, not intractable 2023   • IIH (idiopathic intracranial hypertension) 2023   • Hematochezia 2023   • Left carotid stenosis 2023   • Colonic ischemia ([address]) 2023   • Injury of left facial nerve 2023   • Partial facial nerve palsy 2023   • Hepatic steatosis 2022   • Asymptomatic stenosis of left carotid artery 10/05/2022   • Stenosis of left carotid artery 2022   • Carotid artery stenosis 2022   • Appendix disease 2022   • Urinary retention 2022   • Peripheral vascular disease ([address]) 2022   • 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   • COPD (chronic obstructive pulmonary disease) ([address]) 2021   • Unspecified diastolic (congestive) heart failure ([address]) 2021   • Hypertensive heart disease with congestive heart failure ([address]) 2020   • CAMILLE (obstructive sleep apnea)    • Lumbar radiculopathy 10/01/2020   • Paroxysmal atrial fibrillation ([address]) 2020   • Allergic conjunctivitis of both eyes 2020   • Angio-edema 2020   • Gastroesophageal reflux disease without esophagitis 2019   • Allergic reaction 2018   • AMD (age-related macular degeneration), bilateral 2018   • Angina pectoris ([address]) 11/15/2017   • Migraines 10/14/2016   • Essential hypertension 2015      LOS (days): 9  Geometric Mean LOS (GMLOS) (days): 5.30  Days to GMLOS:-4.1     OBJECTIVE:  Risk of Unplanned Readmission Score: 24.69         Current admission status: Inpatient   Preferred Pharmacy:   [id number] - [place] - [address]  Phone: [phone] Fax: [phone]    [address]55 [facility], [address] [facility] 7400 [facility]  [address] [facility] [address] [address]  Phone: [phone] Fax: 1520 [facility], 1801 ,  3700 [facility] [place] [facility],  1798 [facility] 1515 [facility]  Phone: [phone] Fax: [phone]    Primary Care Provider: Janee Sy DO    Primary Insurance: [facility] REP  Secondary Insurance:     DISCHARGE DETAILS:    Discharge planning discussed with[de-identified] pt  Freedom of Choice: Yes  Comments - Freedom of Choice: Now not agreeable to IP Rehab. Referrals made to [facility] and will f/u with chocie list.                     [address]         Is the patient interested in [address] at discharge?: Yes  [address] [facility] requested[de-identified] Nursing, Occupational Therapy, Physical [facility] Provider[de-identified] PCP  Home Health Services Needed[de-identified] Evaluate Functional Status and Safety, Strengthening/Theraputic Exercises to Improve Function, Gait/ADL Training, Other (comment) (Medical Management)  Homebound Criteria Met[de-identified] Requires the Assistance of Another Person for Safe Ambulation or to Leave the Home, Uses an Assist Device (i.e. cane, walker, etc)  Supporting Clincal Findings[de-identified] Limited Endurance, Fatigues Easliy in [employer]    DME Referral Provided  Referral made for DME?: Yes  DME referral completed for the following items[de-identified] Marlo Osborn  DME Supplier Name[de-identified] [employer]              Treatment Team Recommendation: Home with [address]  Discharge Destination Plan[de-identified] Home with [address]                                         Additional Comments: Pt is refusing IP Rehab and PT saw pt and now are recommending home with therapy.  Canc IP Rehab referral. Order VNA and will f/u with choice list. Pt stated she gave her walker away and will need a new one. Made referral for walker and told pt she might have a cost. Ask [employer] to f/u with pt. Pt stated she needs to use LYFT ride home.

## 2023-07-28 NOTE — CASE MANAGEMENT
Case Management Discharge Planning Note    Patient name Sangeetha Farnsworth  Location [id number] [place] [address] [place] [address] [place] [address]-* MRN [id number]  : 1960 Date 2023       Current Admission Date: 2023  Current Admission Diagnosis:Colonic ischemia [facility])   Patient Active Problem List    Diagnosis Date Noted   • Acute postoperative pain 2023   • Colitis 2023   • Chronic migraine w/o aura w/o status migrainosus, not intractable 2023   • IIH (idiopathic intracranial hypertension) 2023   • Hematochezia 2023   • Left carotid stenosis 2023   • Colonic ischemia ([address]) 2023   • Injury of left facial nerve 2023   • Partial facial nerve palsy 2023   • Hepatic steatosis 2022   • Asymptomatic stenosis of left carotid artery 10/05/2022   • Stenosis of left carotid artery 2022   • Carotid artery stenosis 2022   • Appendix disease 2022   • Urinary retention 2022   • Peripheral vascular disease ([address]) 2022   • 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   • COPD (chronic obstructive pulmonary disease) ([address]) 2021   • Unspecified diastolic (congestive) heart failure ([address]) 2021   • Hypertensive heart disease with congestive heart failure ([address]) 2020   • CAMILLE (obstructive sleep apnea)    • Lumbar radiculopathy 10/01/2020   • Paroxysmal atrial fibrillation ([address]) 2020   • Allergic conjunctivitis of both eyes 2020   • Angio-edema 2020   • Gastroesophageal reflux disease without esophagitis 2019   • Allergic reaction 2018   • AMD (age-related macular degeneration), bilateral 2018   • Angina pectoris ([address]) 11/15/2017   • Migraines 10/14/2016   • Essential hypertension 2015      LOS (days): 9  Geometric Mean LOS (GMLOS) (days): 5.30  Days to GMLOS:-4.2     OBJECTIVE:  Risk of Unplanned Readmission Score: 26.22         Current admission status: Inpatient   Preferred Pharmacy:   [facility] [id number] - [place] - [address]  Phone: [phone] Fax: [phone]    3655 [facility], [address] [facility] 7400 [facility]  [address] 533 [address] [facility]  Phone: [phone] Fax: 9192 [place], [place] - 3700 [facility],  3700 [facility],  [address] [address]  Phone: [phone] Fax: [phone]    Primary Care Provider: Abby Paniagua DO    Primary Insurance: Earlene Jeans [facility] REP  Secondary Insurance:     DISCHARGE DETAILS:            Transport at Discharge : [address]: Yes     Transported by [employer] and Unit #): [place]  ETA of Transport (Date): 07/28/23  ETA of Transport (Time): 1700       Additional Comments: [employer] will  at 1700. RN and  aware of  time.

## 2023-07-28 NOTE — RESTORATIVE TECHNICIAN NOTE
Restorative Technician Note      Patient Name: Delma Dos Santos     Restorative Tech Visit Date: 07/28/23  Note Type: Mobility  Patient Position Upon Consult: Supine  Mobility / Activity Provided: helped pt get ready for discharge  Activity Performed: Stood; Ambulated  Assistive Device: Roller walker  Range of Motion: Active; All extremities  Patient Position at End of Consult: Seated edge of bed;  All needs within reach; Bed/Chair alarm activated

## 2023-07-28 NOTE — RESTORATIVE TECHNICIAN NOTE
Restorative Technician Note      Patient Name: Nneka Shipman     Restorative Tech Visit Date: 07/28/23  Note Type: Mobility  Patient Position Upon Consult: Other (Comment) (pt was in the restroom)  Activity Performed: Ambulated; Range of motion  Assistive Device: Roller walker  Range of Motion: Active; All extremities  Patient Position at End of Consult: Supine;  All needs within reach

## 2023-07-28 NOTE — PLAN OF CARE
Problem: PAIN - ADULT  Goal: Verbalizes/displays adequate comfort level or baseline comfort level  Description: Interventions:  - Encourage patient to monitor pain and request assistance  - Assess pain using appropriate pain scale  - Administer analgesics based on type and severity of pain and evaluate response  - Implement non-pharmacological measures as appropriate and evaluate response  - Consider cultural and social influences on pain and pain management  - Notify physician/advanced practitioner if interventions unsuccessful or patient reports new pain  7/28/2023 1549 by Brandi Infante RN  Outcome: Adequate for Discharge  7/28/2023 0754 by Brandi Infante RN  Outcome: Progressing     Problem: INFECTION - ADULT  Goal: Absence or prevention of progression during hospitalization  Description: INTERVENTIONS:  - Assess and monitor for signs and symptoms of infection  - Monitor lab/diagnostic results  - Monitor all insertion sites, i.e. indwelling lines, tubes, and drains  - Monitor endotracheal if appropriate and nasal secretions for changes in amount and color  - [place] appropriate cooling/warming therapies per order  - Administer medications as ordered  - Instruct and encourage patient and family to use good hand hygiene technique  - Identify and instruct in appropriate isolation precautions for identified infection/condition  7/28/2023 1549 by Brandi Infante RN  Outcome: Adequate for Discharge  7/28/2023 0754 by Brandi Infante RN  Outcome: Progressing  Goal: Absence of fever/infection during neutropenic period  Description: INTERVENTIONS:  - Monitor WBC    7/28/2023 1549 by Brandi Infante RN  Outcome: Adequate for Discharge  7/28/2023 0754 by Brandi Infante RN  Outcome: Progressing     Problem: SAFETY ADULT  Goal: Patient will remain free of falls  Description: INTERVENTIONS:  - Educate patient/family on patient safety including physical limitations  - Instruct patient to call for assistance with activity   - Consult OT/PT to assist with strengthening/mobility   - Keep Call bell within reach  - Keep bed low and locked with side rails adjusted as appropriate  - Keep care items and personal belongings within reach  - Initiate and maintain comfort rounds  - Make Fall Risk Sign visible to staff  - Offer Toileting every 2 Hours, in advance of need  - Initiate/Maintain bed alarm  - Obtain necessary fall risk management equipment:   - Apply yellow socks and bracelet for high fall risk patients  - Consider moving patient to room near nurses station  7/28/2023 1549 by Fanny Brown RN  Outcome: Adequate for Discharge  7/28/2023 0754 by Fanny Brown RN  Outcome: Progressing  Goal: Maintain or return to baseline ADL function  Description: INTERVENTIONS:  -  Assess patient's ability to carry out ADLs; assess patient's baseline for ADL function and identify physical deficits which impact ability to perform ADLs (bathing, care of mouth/teeth, toileting, grooming, dressing, etc.)  - Assess/evaluate cause of self-care deficits   - Assess range of motion  - Assess patient's mobility; develop plan if impaired  - Assess patient's need for assistive devices and provide as appropriate  - Encourage maximum independence but intervene and supervise when necessary  - Involve family in performance of ADLs  - Assess for home care needs following discharge   - Consider OT consult to assist with ADL evaluation and planning for discharge  - Provide patient education as appropriate  7/28/2023 1549 by Fanny Brown RN  Outcome: Adequate for Discharge  7/28/2023 0754 by Fanny Brown RN  Outcome: Progressing  Goal: Maintains/Returns to pre admission functional level  Description: INTERVENTIONS:  - Perform BMAT or MOVE assessment daily.   - Set and communicate daily mobility goal to care team and patient/family/caregiver. - Collaborate with rehabilitation services on mobility goals if consulted  - Perform Range of Motion 3 times a day.   - Reposition patient every 3 hours. - Dangle patient 3 times a day  - Stand patient 3 times a day  - Ambulate patient 3 times a day  - Out of bed to chair 3 times a day   - Out of bed for meals 3 times a day  - Out of bed for toileting  - Record patient progress and toleration of activity level   7/28/2023 1549 by Robin Dickey RN  Outcome: Adequate for Discharge  7/28/2023 0754 by Robin Dickey RN  Outcome: Progressing     Problem: DISCHARGE PLANNING  Goal: Discharge to home or other facility with appropriate resources  Description: INTERVENTIONS:  - Identify barriers to discharge w/patient and caregiver  - Arrange for needed discharge resources and transportation as appropriate  - Identify discharge learning needs (meds, wound care, etc.)  - Arrange for interpretive services to assist at discharge as needed  - Refer to Case Management Department for coordinating discharge planning if the patient needs post-hospital services based on physician/advanced practitioner order or complex needs related to functional status, cognitive ability, or social support system  7/28/2023 1549 by Robin Dickey RN  Outcome: Adequate for Discharge  7/28/2023 0754 by Robin Dickey RN  Outcome: Progressing     Problem: Knowledge Deficit  Goal: Patient/family/caregiver demonstrates understanding of disease process, treatment plan, medications, and discharge instructions  Description: Complete learning assessment and assess knowledge base.   Interventions:  - Provide teaching at level of understanding  - Provide teaching via preferred learning methods  7/28/2023 1549 by Robin Dickey RN  Outcome: Adequate for Discharge  7/28/2023 0754 by Robin Dickey RN  Outcome: Progressing     Problem: Prexisting or High Potential for Compromised Skin Integrity  Goal: Skin integrity is maintained or improved  Description: INTERVENTIONS:  - Identify patients at risk for skin breakdown  - Assess and monitor skin integrity  - Assess and monitor nutrition and hydration status  - Monitor labs   - Assess for incontinence   - Turn and reposition patient  - Assist with mobility/ambulation  - Relieve pressure over bony prominences  - Avoid friction and shearing  - Provide appropriate hygiene as needed including keeping skin clean and dry  - Evaluate need for skin moisturizer/barrier cream  - Collaborate with interdisciplinary team   - Patient/family teaching  - Consider wound care consult   7/28/2023 1549 by Tsering Shafer RN  Outcome: Adequate for Discharge  7/28/2023 0754 by Tsering Shafer RN  Outcome: Progressing     Problem: MOBILITY - ADULT  Goal: Maintain or return to baseline ADL function  Description: INTERVENTIONS:  -  Assess patient's ability to carry out ADLs; assess patient's baseline for ADL function and identify physical deficits which impact ability to perform ADLs (bathing, care of mouth/teeth, toileting, grooming, dressing, etc.)  - Assess/evaluate cause of self-care deficits   - Assess range of motion  - Assess patient's mobility; develop plan if impaired  - Assess patient's need for assistive devices and provide as appropriate  - Encourage maximum independence but intervene and supervise when necessary  - Involve family in performance of ADLs  - Assess for home care needs following discharge   - Consider OT consult to assist with ADL evaluation and planning for discharge  - Provide patient education as appropriate  7/28/2023 1549 by Tsering Shafer RN  Outcome: Adequate for Discharge  7/28/2023 0754 by Tsering Shafer RN  Outcome: Progressing  Goal: Maintains/Returns to pre admission functional level  Description: INTERVENTIONS:  - Perform BMAT or MOVE assessment daily.   - Set and communicate daily mobility goal to care team and patient/family/caregiver. - Collaborate with rehabilitation services on mobility goals if consulted  - Perform Range of Motion 3 times a day. - Reposition patient every 3 hours.   - Dangle patient 3 times a day  - Stand patient 3 times a day  - Ambulate patient 3 times a day  - Out of bed to chair 3 times a day   - Out of bed for meals 3 times a day  - Out of bed for toileting  - Record patient progress and toleration of activity level   7/28/2023 1549 by Barbara Ruiz RN  Outcome: Adequate for Discharge  7/28/2023 0754 by Barbara Ruiz RN  Outcome: Progressing     Problem: OCCUPATIONAL THERAPY ADULT  Goal: Performs self-care activities at highest level of function for planned discharge setting. See evaluation for individualized goals. Description: Treatment Interventions: ADL retraining, Functional transfer training, Endurance training, Patient/family training, Compensatory technique education, Energy conservation, Activityengagement, Equipment evaluation/education          See flowsheet documentation for full assessment, interventions and recommendations. Outcome: Adequate for Discharge     Problem: PHYSICAL THERAPY ADULT  Goal: Performs mobility at highest level of function for planned discharge setting. See evaluation for individualized goals. Description: Treatment/Interventions: Functional transfer training, LE strengthening/ROM, Therapeutic exercise, Elevations, Endurance training, Patient/family training, Bed mobility, Gait training, Spoke to nursing, OT  Equipment Recommended: Bakari Myers       See flowsheet documentation for full assessment, interventions and recommendations. Outcome: Adequate for Discharge     Problem: Nutrition/Hydration-ADULT  Goal: Nutrient/Hydration intake appropriate for improving, restoring or maintaining nutritional needs  Description: Monitor and assess patient's nutrition/hydration status for malnutrition. Collaborate with interdisciplinary team and initiate plan and interventions as ordered. Monitor patient's weight and dietary intake as ordered or per policy. Utilize nutrition screening tool and intervene as necessary. Determine patient's food preferences and provide high-protein, high-caloric foods as appropriate. INTERVENTIONS:  - Monitor oral intake, urinary output, labs, and treatment plans  - Assess nutrition and hydration status and recommend course of action  - Evaluate amount of meals eaten  - Assist patient with eating if necessary   - Allow adequate time for meals  - Recommend/ encourage appropriate diets, oral nutritional supplements, and vitamin/mineral supplements  - Order, calculate, and assess calorie counts as needed  - Recommend, monitor, and adjust tube feedings and TPN/PPN based on assessed needs  - Assess need for intravenous fluids  - Provide specific nutrition/hydration education as appropriate  - Include patient/family/caregiver in decisions related to nutrition  7/28/2023 1549 by Filiberto Gurrola RN  Outcome: Adequate for Discharge  7/28/2023 0754 by Filiberto Gurrola, RN  Outcome: Progressing

## 2023-07-28 NOTE — DISCHARGE INSTR - AVS FIRST PAGE
[facility] Instructions  Dr. Maeve Dickey MD, FACS    1. General: You will feel pulling sensations around the wound or funny aches and pains around the incisions. This is normal. Even minor surgery is a change in your body and this is your body’s way of reaction to it. If you have had abdominal surgery, it may help to support the incision with a small pillow or blanket for comfort when moving or coughing. 2. Wound care: Make sure to remove the bandage in about 24 hours, unless instructed otherwise. You usually don't have to redress the wound after 24-48 hours, unless for comfort. Keep the incision clean and dry. Let air get to it. If this Steri-Strips fall off, just keep the wound clean. 3. Water: You may shower over the wound, unless there are drain tubes left in place. Do not bathe or use a pool or hot tub until cleared by the physician. You may shower right over the staples or Steri-Strips and packing dry when you are done. 4. Activity: You may go up and down stairs, walk as much as you are comfortable, but walk at least 3 times each day. If you have had abdominal surgery, do not lift anything heavier than 15 pounds for at least 2-4 weeks, unless cleared by the doctor. 5. Diet: You may resume a regular diet. If you had a same-day surgery or overnight stay surgery, you may wish to eat lightly for a few days: soups, crackers, and sandwiches. You may resume a regular diet when ready. 6. Medications: You may resume your metformin on 02/12/202. Should be held for 48 hours following IV contrast dye. Resume all of your previous medications. Avoid aspirin or ibuprofen (Advil, Motrin, etc.) products for 2-3 days after the date of surgery. You may, at that time, began to take them again. Tylenol is always fine, unless you are taking any narcotic pain medication containing Tylenol (such as Percocet, Darvocet, Vicodin, or anything containing acetaminophen).  Do not take Tylenol if you're taking these medications. You do not need to take the narcotic pain medications unless you are having significant pain and discomfort. 7. Driving: You will need someone to drive you home on the day of surgery. Do not drive or make any important decisions while on narcotic pain medication or 24 hours and after anesthesia or sedation for surgery. Generally, you may drive when your off all narcotic pain medications. 8. Upset Stomach: You may take Maalox, Tums, or similar items for an upset stomach. If your narcotic pain medication causes an upset stomach, do not take it on an empty stomach. Try taking it with at least some crackers or toast.     9. Constipation: Patients often experienced constipation after surgery. You may take over-the-counter medication for this, such as Metamucil, Senokot, Dulcolax, milk of magnesia, etc. You may take a suppository unless you have had anorectal surgery such as a procedure on your hemorrhoids. If you experience significant nausea or vomiting after abdominal surgery, call the office before trying any of these medications. 10. Call the office: If you are experiencing any of the following, fevers above 101.5°, significant nausea or vomiting, if the wound develops drainage and/or is excessive redness around the wound, or if you have significant diarrhea or other worsening symptoms. 11. Pain: You may be given a prescription for pain. This will be given to the hospital, the day of surgery. 12. Sexual Activity: You may resume sexual activity when you feel ready and comfortable and your incision is sealed and healed without apparent infection risk. 13. Urination: If you haven't urinated in 6 hours, go directly to the ER for evaluation for urinary retention.      [address] [facility], [address]  [address]  Phone: [phone]

## 2023-07-28 NOTE — CASE MANAGEMENT
Case Management Discharge Planning Note    Patient name Lor Monge  Location [id number] [place] [address] [place] [address] [place] [address]-* MRN [id number]  : 1960 Date 2023       Current Admission Date: 2023  Current Admission Diagnosis:Colonic ischemia [facility])   Patient Active Problem List    Diagnosis Date Noted   • Acute postoperative pain 2023   • Colitis 2023   • Chronic migraine w/o aura w/o status migrainosus, not intractable 2023   • IIH (idiopathic intracranial hypertension) 2023   • Hematochezia 2023   • Left carotid stenosis 2023   • Colonic ischemia ([address]) 2023   • Injury of left facial nerve 2023   • Partial facial nerve palsy 2023   • Hepatic steatosis 2022   • Asymptomatic stenosis of left carotid artery 10/05/2022   • Stenosis of left carotid artery 2022   • Carotid artery stenosis 2022   • Appendix disease 2022   • Urinary retention 2022   • Peripheral vascular disease ([address]) 2022   • 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   • COPD (chronic obstructive pulmonary disease) ([address]) 2021   • Unspecified diastolic (congestive) heart failure ([address]) 2021   • Hypertensive heart disease with congestive heart failure ([address]) 2020   • CAMILLE (obstructive sleep apnea)    • Lumbar radiculopathy 10/01/2020   • Paroxysmal atrial fibrillation ([address]) 2020   • Allergic conjunctivitis of both eyes 2020   • Angio-edema 2020   • Gastroesophageal reflux disease without esophagitis 2019   • Allergic reaction 2018   • AMD (age-related macular degeneration), bilateral 2018   • Angina pectoris ([address]) 11/15/2017   • Migraines 10/14/2016   • Essential hypertension 2015      LOS (days): 9  Geometric Mean LOS (GMLOS) (days): 5.30  Days to GMLOS:-4.1     OBJECTIVE:  Risk of Unplanned Readmission Score: 24.69         Current admission status: Inpatient   Preferred Pharmacy:   [id number] - [place] - [address]  Phone: [phone] Fax: [phone]    [address] [facility], [address] [facility] 7[address] [facility]  [address] [facility] [address] [address] [address]  Phone: [phone] Fax: [address] [facility], [address] [facility],  [address] [facility],  [address] [facility] 1515 [facility]  Phone: [phone] Fax: [phone]    Primary Care Provider: Monroe Jarvis DO    Primary Insurance: [facility] REP  Secondary Insurance:     DISCHARGE DETAILS:    Discharge planning discussed with[de-identified] pt  Freedom of Choice: Yes  Comments - Freedom of Choice: Now not agreeable to IP Rehab. Referrals made to [facility] and will f/u with [facility] list.                     [address]         Is the patient interested in [facility] at discharge?: Yes  [address] [facility] requested[de-identified] Nursing, Occupational Therapy, Physical [facility] Provider[de-identified] PCP  Home Health Services Needed[de-identified] Evaluate Functional Status and Safety, Strengthening/Theraputic Exercises to Improve Function, Gait/ADL Training, Other (comment) (Medical Management)  Homebound Criteria Met[de-identified] Requires the Assistance of Another Person for Safe Ambulation or to Leave the Home, Uses an Assist Device (i.e. cane, walker, etc)  Supporting Clincal Findings[de-identified] Limited Endurance, Fatigues Easliy in [employer]    DME Referral Provided  Referral made for DME?: Yes  DME referral completed for the following items[de-identified] Peterson Nguyen  DME Supplier Name[de-identified] [employer]              Treatment Team Recommendation: Home with [address]  Discharge Destination Plan[de-identified] Home with [address]          Additional Comments: Pt did confirm she had not had IP BH stay in the past 2 years and is managed by medication.

## 2023-07-28 NOTE — CASE MANAGEMENT
Case Management Discharge Planning Note    Patient name Yesica Carpenter  Location [address] [id number] [place] [address] [place] [address] [place] [address]-* MRN [id number]  : 1960 Date 2023       Current Admission Date: 2023  Current Admission Diagnosis:Colonic ischemia [facility])   Patient Active Problem List    Diagnosis Date Noted   • Acute postoperative pain 2023   • Colitis 2023   • Chronic migraine w/o aura w/o status migrainosus, not intractable 2023   • IIH (idiopathic intracranial hypertension) 2023   • Hematochezia 2023   • Left carotid stenosis 2023   • Colonic ischemia ([address]) 2023   • Injury of left facial nerve 2023   • Partial facial nerve palsy 2023   • Hepatic steatosis 2022   • Asymptomatic stenosis of left carotid artery 10/05/2022   • Stenosis of left carotid artery 2022   • Carotid artery stenosis 2022   • Appendix disease 2022   • Urinary retention 2022   • Peripheral vascular disease ([address]) 2022   • 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   • COPD (chronic obstructive pulmonary disease) ([address]) 2021   • Unspecified diastolic (congestive) heart failure ([address]) 2021   • Hypertensive heart disease with congestive heart failure ([address]) 2020   • CAMILLE (obstructive sleep apnea)    • Lumbar radiculopathy 10/01/2020   • Paroxysmal atrial fibrillation ([address]) 2020   • Allergic conjunctivitis of both eyes 2020   • Angio-edema 2020   • Gastroesophageal reflux disease without esophagitis 2019   • Allergic reaction 2018   • AMD (age-related macular degeneration), bilateral 2018   • Angina pectoris ([address]) 11/15/2017   • Migraines 10/14/2016   • Essential hypertension 2015      LOS (days): 9  Geometric Mean LOS (GMLOS) (days): 5.30  Days to GMLOS:-4.1     OBJECTIVE:  Risk of Unplanned Readmission Score: 26.41         Current admission status: Inpatient   Preferred Pharmacy:   [facility] - [place] - [address]  Phone: [phone] Fax: [phone]    [address] [facility], [address] [place] [address] [address] [place] [address]  [address] [address] [facility]  Phone: [phone] Fax: 1520 [facility], [place] - [address] [facility],  [address] [facility],  [address] [place] [address]  Phone: [phone] Fax: [phone]    Primary Care Provider: Jamee Albert DO    Primary Insurance: [facility] REP  Secondary Insurance:     DISCHARGE DETAILS:                                [address] [facility] Agency Name<IPADDSHRUTHI> Ni RAMIREZ                                                                Additional Comments: [facility] was the only accepting agency. MD is ready to discharge today.

## 2023-07-28 NOTE — PLAN OF CARE
Problem: PAIN - ADULT  Goal: Verbalizes/displays adequate comfort level or baseline comfort level  Description: Interventions:  - Encourage patient to monitor pain and request assistance  - Assess pain using appropriate pain scale  - Administer analgesics based on type and severity of pain and evaluate response  - Implement non-pharmacological measures as appropriate and evaluate response  - Consider cultural and social influences on pain and pain management  - Notify physician/advanced practitioner if interventions unsuccessful or patient reports new pain  Outcome: Progressing     Problem: INFECTION - ADULT  Goal: Absence or prevention of progression during hospitalization  Description: INTERVENTIONS:  - Assess and monitor for signs and symptoms of infection  - Monitor lab/diagnostic results  - Monitor all insertion sites, i.e. indwelling lines, tubes, and drains  - Monitor endotracheal if appropriate and nasal secretions for changes in amount and color  - [place] appropriate cooling/warming therapies per order  - Administer medications as ordered  - Instruct and encourage patient and family to use good hand hygiene technique  - Identify and instruct in appropriate isolation precautions for identified infection/condition  Outcome: Progressing  Goal: Absence of fever/infection during neutropenic period  Description: INTERVENTIONS:  - Monitor WBC    Outcome: Progressing     Problem: SAFETY ADULT  Goal: Patient will remain free of falls  Description: INTERVENTIONS:  - Educate patient/family on patient safety including physical limitations  - Instruct patient to call for assistance with activity   - Consult OT/PT to assist with strengthening/mobility   - Keep Call bell within reach  - Keep bed low and locked with side rails adjusted as appropriate  - Keep care items and personal belongings within reach  - Initiate and maintain comfort rounds  - Make Fall Risk Sign visible to staff  - Offer Toileting every 2 Hours, in advance of need  - Initiate/Maintain bed alarm  - Obtain necessary fall risk management equipment:   - Apply yellow socks and bracelet for high fall risk patients  - Consider moving patient to room near nurses station  Outcome: Progressing  Goal: Maintain or return to baseline ADL function  Description: INTERVENTIONS:  -  Assess patient's ability to carry out ADLs; assess patient's baseline for ADL function and identify physical deficits which impact ability to perform ADLs (bathing, care of mouth/teeth, toileting, grooming, dressing, etc.)  - Assess/evaluate cause of self-care deficits   - Assess range of motion  - Assess patient's mobility; develop plan if impaired  - Assess patient's need for assistive devices and provide as appropriate  - Encourage maximum independence but intervene and supervise when necessary  - Involve family in performance of ADLs  - Assess for home care needs following discharge   - Consider OT consult to assist with ADL evaluation and planning for discharge  - Provide patient education as appropriate  Outcome: Progressing  Goal: Maintains/Returns to pre admission functional level  Description: INTERVENTIONS:  - Perform BMAT or MOVE assessment daily.   - Set and communicate daily mobility goal to care team and patient/family/caregiver. - Collaborate with rehabilitation services on mobility goals if consulted  - Perform Range of Motion 3 times a day. - Reposition patient every 3 hours.   - Dangle patient 3 times a day  - Stand patient 3 times a day  - Ambulate patient 3 times a day  - Out of bed to chair 3 times a day   - Out of bed for meals 3 times a day  - Out of bed for toileting  - Record patient progress and toleration of activity level   Outcome: Progressing     Problem: DISCHARGE PLANNING  Goal: Discharge to home or other facility with appropriate resources  Description: INTERVENTIONS:  - Identify barriers to discharge w/patient and caregiver  - Arrange for needed discharge resources and transportation as appropriate  - Identify discharge learning needs (meds, wound care, etc.)  - Arrange for interpretive services to assist at discharge as needed  - Refer to Case Management Department for coordinating discharge planning if the patient needs post-hospital services based on physician/advanced practitioner order or complex needs related to functional status, cognitive ability, or social support system  Outcome: Progressing     Problem: Knowledge Deficit  Goal: Patient/family/caregiver demonstrates understanding of disease process, treatment plan, medications, and discharge instructions  Description: Complete learning assessment and assess knowledge base.   Interventions:  - Provide teaching at level of understanding  - Provide teaching via preferred learning methods  Outcome: Progressing     Problem: Prexisting or High Potential for Compromised Skin Integrity  Goal: Skin integrity is maintained or improved  Description: INTERVENTIONS:  - Identify patients at risk for skin breakdown  - Assess and monitor skin integrity  - Assess and monitor nutrition and hydration status  - Monitor labs   - Assess for incontinence   - Turn and reposition patient  - Assist with mobility/ambulation  - Relieve pressure over bony prominences  - Avoid friction and shearing  - Provide appropriate hygiene as needed including keeping skin clean and dry  - Evaluate need for skin moisturizer/barrier cream  - Collaborate with interdisciplinary team   - Patient/family teaching  - Consider wound care consult   Outcome: Progressing     Problem: MOBILITY - ADULT  Goal: Maintain or return to baseline ADL function  Description: INTERVENTIONS:  -  Assess patient's ability to carry out ADLs; assess patient's baseline for ADL function and identify physical deficits which impact ability to perform ADLs (bathing, care of mouth/teeth, toileting, grooming, dressing, etc.)  - Assess/evaluate cause of self-care deficits   - Assess range of motion  - Assess patient's mobility; develop plan if impaired  - Assess patient's need for assistive devices and provide as appropriate  - Encourage maximum independence but intervene and supervise when necessary  - Involve family in performance of ADLs  - Assess for home care needs following discharge   - Consider OT consult to assist with ADL evaluation and planning for discharge  - Provide patient education as appropriate  Outcome: Progressing  Goal: Maintains/Returns to pre admission functional level  Description: INTERVENTIONS:  - Perform BMAT or MOVE assessment daily.   - Set and communicate daily mobility goal to care team and patient/family/caregiver. - Collaborate with rehabilitation services on mobility goals if consulted  - Perform Range of Motion 3 times a day. - Reposition patient every 3 hours. - Dangle patient 3 times a day  - Stand patient 3 times a day  - Ambulate patient 3 times a day  - Out of bed to chair 3 times a day   - Out of bed for meals 3 times a day  - Out of bed for toileting  - Record patient progress and toleration of activity level   Outcome: Progressing     Problem: Nutrition/Hydration-ADULT  Goal: Nutrient/Hydration intake appropriate for improving, restoring or maintaining nutritional needs  Description: Monitor and assess patient's nutrition/hydration status for malnutrition. Collaborate with interdisciplinary team and initiate plan and interventions as ordered. Monitor patient's weight and dietary intake as ordered or per policy. Utilize nutrition screening tool and intervene as necessary. Determine patient's food preferences and provide high-protein, high-caloric foods as appropriate.      INTERVENTIONS:  - Monitor oral intake, urinary output, labs, and treatment plans  - Assess nutrition and hydration status and recommend course of action  - Evaluate amount of meals eaten  - Assist patient with eating if necessary   - Allow adequate time for meals  - Recommend/ encourage appropriate diets, oral nutritional supplements, and vitamin/mineral supplements  - Order, calculate, and assess calorie counts as needed  - Recommend, monitor, and adjust tube feedings and TPN/PPN based on assessed needs  - Assess need for intravenous fluids  - Provide specific nutrition/hydration education as appropriate  - Include patient/family/caregiver in decisions related to nutrition  Outcome: Progressing

## 2023-07-28 NOTE — CASE MANAGEMENT
Case Management Discharge Planning Note    Patient name Mayra Eduardo  Location [id number] [place] [address] [place] [address] [place] [address]-* MRN [id number]  : 1960 Date 2023       Current Admission Date: 2023  Current Admission Diagnosis:Colonic ischemia [facility])   Patient Active Problem List    Diagnosis Date Noted   • Acute postoperative pain 2023   • Colitis 2023   • Chronic migraine w/o aura w/o status migrainosus, not intractable 2023   • IIH (idiopathic intracranial hypertension) 2023   • Hematochezia 2023   • Left carotid stenosis 2023   • Colonic ischemia ([address]) 2023   • Injury of left facial nerve 2023   • Partial facial nerve palsy 2023   • Hepatic steatosis 2022   • Asymptomatic stenosis of left carotid artery 10/05/2022   • Stenosis of left carotid artery 2022   • Carotid artery stenosis 2022   • Appendix disease 2022   • Urinary retention 2022   • Peripheral vascular disease ([address]) 2022   • 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   • COPD (chronic obstructive pulmonary disease) ([address]) 2021   • Unspecified diastolic (congestive) heart failure ([address]) 2021   • Hypertensive heart disease with congestive heart failure ([address]) 2020   • CAMILLE (obstructive sleep apnea)    • Lumbar radiculopathy 10/01/2020   • Paroxysmal atrial fibrillation ([address]) 2020   • Allergic conjunctivitis of both eyes 2020   • Angio-edema 2020   • Gastroesophageal reflux disease without esophagitis 2019   • Allergic reaction 2018   • AMD (age-related macular degeneration), bilateral 2018   • Angina pectoris ([address]) 11/15/2017   • Migraines 10/14/2016   • Essential hypertension 2015      LOS (days): 9  Geometric Mean LOS (GMLOS) (days): 5.30  Days to GMLOS:-4.2     OBJECTIVE:  Risk of Unplanned Readmission Score: 26.41         Current admission status: Inpatient   Preferred Pharmacy:   [id number] - [place], [address]  Phone: [phone] Fax: [phone]    3655 [facility], [address] [facility] 7400 [facility]  [address] [place] [address] [address] [facility]  Phone: [phone] Fax: 1525 [facility], [place] - 3700 [facility],  3700 [facility],  [address] 1515 [facility]  Phone: [phone] Fax: [phone]    Primary Care Provider: Tana Sharpe DO    Primary Insurance: [facility] REP  Secondary Insurance:     DISCHARGE DETAILS:                                [facility] [facility] Name[de-identified] Timothy Jones ASHLEY                                                                Additional Comments: Made pt aware unable to get walker. Pt stated she understood and plans to buy one as her insurance will give her $200.00 to use for over the courter items. Pt is also calling her friend to pick her up. RN is aware of this.

## 2023-07-28 NOTE — NURSING NOTE
Pt discharged. Pt took all belongings. Pt showed no signs of distress. Pt escorted via ambucab wheelchair.

## 2023-07-28 NOTE — CASE MANAGEMENT
Case Management Discharge Planning Note    Patient name Sussy Dos Santos  Location [id number] [place] [address] [place] [address] [place] [address]-* MRN [id number]  : 1960 Date 2023       Current Admission Date: 2023  Current Admission Diagnosis:Colonic ischemia [facility])   Patient Active Problem List    Diagnosis Date Noted   • Acute postoperative pain 2023   • Colitis 2023   • Chronic migraine w/o aura w/o status migrainosus, not intractable 2023   • IIH (idiopathic intracranial hypertension) 2023   • Hematochezia 2023   • Left carotid stenosis 2023   • Colonic ischemia ([address]) 2023   • Injury of left facial nerve 2023   • Partial facial nerve palsy 2023   • Hepatic steatosis 2022   • Asymptomatic stenosis of left carotid artery 10/05/2022   • Stenosis of left carotid artery 2022   • Carotid artery stenosis 2022   • Appendix disease 2022   • Urinary retention 2022   • Peripheral vascular disease ([address]) 2022   • 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   • COPD (chronic obstructive pulmonary disease) ([address]) 2021   • Unspecified diastolic (congestive) heart failure ([address]) 2021   • Hypertensive heart disease with congestive heart failure ([address]) 2020   • CAMILLE (obstructive sleep apnea)    • Lumbar radiculopathy 10/01/2020   • Paroxysmal atrial fibrillation ([address]) 2020   • Allergic conjunctivitis of both eyes 2020   • Angio-edema 2020   • Gastroesophageal reflux disease without esophagitis 2019   • Allergic reaction 2018   • AMD (age-related macular degeneration), bilateral 2018   • Angina pectoris ([address]) 11/15/2017   • Migraines 10/14/2016   • Essential hypertension 2015      LOS (days): 9  Geometric Mean LOS (GMLOS) (days): 5.30  Days to GMLOS:-4.2     OBJECTIVE:  Risk of Unplanned Readmission Score: 26.41         Current admission status: Inpatient   Preferred Pharmacy:   [facility] [address] [id number] - [place] - [address]  Phone: [phone] Fax: [phone]    3655 [facility], [address] [place] [address] 7400 [facility]  [address] [place] [address] 533 [address] [facility]  Phone: [phone] Fax: 1520 [facility], [place] - 3700 [facility],  3700 [facility],  [facility]8 [address]  Phone: [phone] Fax: [phone]    Primary Care Provider: Vickie Cummings DO    Primary Insurance: Dania Coker [facility] REP  Secondary Insurance:     DISCHARGE DETAILS:       Transport at Discharge : Wheelchair van  Dispatcher Contacted: Yes        ETA of Transport (Date): 07/28/23        Additional Comments: Pt unable to get  a ride home. Made pt aware insruance usually does not cover a w/c van. Pt agreeable to w/c van ride home. Will f/u with pickup time.

## 2023-07-28 NOTE — CASE MANAGEMENT
Case Management Discharge Planning Note    Patient name Dorina De La Rosa  Location [address] [place] [address] [place] [address]-* MRN [id number]  : 1960 Date 2023       Current Admission Date: 2023  Current Admission Diagnosis:Colonic ischemia [facility])   Patient Active Problem List    Diagnosis Date Noted   • Acute postoperative pain 2023   • Colitis 2023   • Chronic migraine w/o aura w/o status migrainosus, not intractable 2023   • IIH (idiopathic intracranial hypertension) 2023   • Hematochezia 2023   • Left carotid stenosis 2023   • Colonic ischemia ([address]) 2023   • Injury of left facial nerve 2023   • Partial facial nerve palsy 2023   • Hepatic steatosis 2022   • Asymptomatic stenosis of left carotid artery 10/05/2022   • Stenosis of left carotid artery 2022   • Carotid artery stenosis 2022   • Appendix disease 2022   • Urinary retention 2022   • Peripheral vascular disease ([address]) 2022   • 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   • COPD (chronic obstructive pulmonary disease) ([address]) 2021   • Unspecified diastolic (congestive) heart failure ([address]) 2021   • Hypertensive heart disease with congestive heart failure ([address]) 2020   • CAMILLE (obstructive sleep apnea)    • Lumbar radiculopathy 10/01/2020   • Paroxysmal atrial fibrillation ([address]) 2020   • Allergic conjunctivitis of both eyes 2020   • Angio-edema 2020   • Gastroesophageal reflux disease without esophagitis 2019   • Allergic reaction 2018   • AMD (age-related macular degeneration), bilateral 2018   • Angina pectoris ([address]) 11/15/2017   • Migraines 10/14/2016   • Essential hypertension 2015      LOS (days): 9  Geometric Mean LOS (GMLOS) (days): 5.30  Days to GMLOS:-4.2     OBJECTIVE:  Risk of Unplanned Readmission Score: 26.41         Current admission status: Inpatient   Preferred Pharmacy:   [facility] [id number] - [place] - [facility] [address]  Phone: [phone] Fax: [phone]    [address] [place] [address] [address] [place] [address] [address] [facility]  Phone: [phone] Fax: 0000 [place], [place] - 3700 [facility],  3700 [facility],  [address] [place] [address]  Phone: [phone] Fax: [phone]    Primary Care Provider: Dimas Silva DO    Primary Insurance: [facility]  Secondary Insurance:     DISCHARGE DETAILS:                                                          Transport at Discharge : Wheelchair van  Dispatcher Contacted: Yes        ETA of Transport (Date): 07/28/23                          Additional Comments: Pt has 7 steps to enter the front and 1 step to enter the back. Pt feels she can do her steps and PT also felt pt could do steps. Pt has meds to  at [facility] and made RN aware of this.

## 2023-07-28 NOTE — DISCHARGE SUMMARY
Discharge Summary - Nancy Sidhu 70 y.o. female MRN: [id number]    Unit/Bed#: E4 -01 Encounter: [id number]    Admission Date:   Admission Orders (From admission, onward)     Ordered        07/19/23 0307  INPATIENT ADMISSION  Once                        Admitting Diagnosis: Lactic acidosis [E87.20]  Colitis [K52.9]  GI bleeding [K92.2]  GI bleed [K92.2]    HPI: Nancy Sidhu is a 70yo female with pmhx significant for A fib- on eliquis, anxiety, asthma, chronic back pain, GERD, HTN, HLD who presented to the ER for GI bleeding. Patient was evaluated by GI and Ct was obtained which showed thickened colon in RAMBO territory. Patient had significant leukocytosis and lactic acidosis. She had GI do a flex sig and she was found to have ischemic in the watershed area. She was taken back to the OR and underwent below mentioned procedure. Procedures Performed: LAPAROTOMY EXPLORATORY W/ BOWEL RESECTION.    LAPAROSCOPY DIAGNOSTIC. LYSIS OF ADHESIONS. LAPARASCOPY TAKE TAKEDOWN OF LEFT COLON.    EXPLORATORY LAPAROSCOPY LYSIS OF ADHESIONS. RESECTION TRANSVERSE COLON , SPLENIC FLEXURE, . AND DESCENDING COLON . TAKE DOWN OF SPLENIC FLEXURE. SIGMOIDOSCOPY. MOBILIZATION OF RIGHT COLON. PRIMARY ANASTOMOSIS EEA 29. TAP BLOCK    Summary of Hospital Course: She underwent the procedure as mentioned above. Post op, she was admitted to critical care. She was kept NPO and started on antibiotics. She was on PCA for pain control. She had an NG tube as well. She was downgraded to [place]. Cardiology was consulted to help manage A fib. NG was discontinued and patient was started on clear liquid diet. Cardiology started patient on amiodarone drip and anticoagulation(hep gtt )was restarted. Patient's pain was improved and her PCA was discontinued. Patient was started on low residue diet and she continued to tolerate diet and working with PT/OT. Pt was tolerating a diet and she was switched to doac and amiodarone.  Patient recommended post acute rehab and discharge planning was recommenced. Electrolytes were replenished. Patient refused rehab and she was sent home with home health care. Her antibiotic course was complicated. Significant Findings, Care, Treatment and Services Provided: cardiology, PT/OT,ICU    Complications:  As above    Discharge Diagnosis: ex lap, with bowel resection    Medical Problems     Resolved Problems  Date Reviewed: 7/27/2023          Resolved    Lactic acidosis 7/20/2023     Resolved by  JUD Sanabria          Condition at Discharge: good         Discharge instructions/Information to patient and family:   See after visit summary for information provided to patient and family. Provisions for Follow-Up Care:  See after visit summary for information related to follow-up care and any pertinent home health orders. PCP: John Alexander DO    Disposition: Home    Planned Readmission: No      Discharge Statement   I spent 15 minutes discharging the patient. This time was spent on the day of discharge. I had direct contact with the patient on the day of discharge. Additional documentation is required if more than 30 minutes were spent on discharge. Discharge Medications:  See after visit summary for reconciled discharge medications provided to patient and family.

## 2023-07-29 ENCOUNTER — TELEPHONE (OUTPATIENT)
Dept: SURGERY | Facility: CLINIC | Age: 63
End: 2023-07-29

## 2023-07-29 RX ORDER — MULTIVIT WITH MINERALS/LUTEIN
1000 TABLET ORAL DAILY
Qty: 30 TABLET | Refills: 0
Start: 2023-07-29 | End: 2023-08-28

## 2023-07-29 RX ORDER — METOPROLOL SUCCINATE 100 MG/1
100 TABLET, EXTENDED RELEASE ORAL 2 TIMES DAILY
Qty: 180 TABLET | Refills: 0
Start: 2023-07-29

## 2023-07-29 NOTE — TELEPHONE ENCOUNTER
Pharmacist spoke with Reyes Cevallos this morning regarding patient medication but has not received any prescriptions for the patient. Paged on call Dr. Carmen Memos via TC.

## 2023-07-29 NOTE — QUICK NOTE
Pharmacy called and this patient did not receive any of her medications they did not go through. Based on looking at the cardiology notes,    We will reorder (based on her AVS and her med rec )     Amiodarone 400 mg 2 times a day for 3 days  Amiodarone 200 mg by mouth daily after the August 1 doses  Diltiazem 120 mg 24-hour capsule and have her discontinue the 12-hour capsules  Metoprolol 100 mg every 12 hours  Potassium chloride 3 times a day with meals. She is to discontinue the previous diltiazem, the metoprolol dose is the same. Pharmacy will reinforce this. I have asked her to call her primary care physician to sort this out on Monday morning but we need to make sure she gets these medications and continues her anticoagulation in the meantime.       Signature:   Rj Philippe MD  Date: 7/29/2023 Time: 12:07 PM

## 2023-07-30 ENCOUNTER — HOME CARE VISIT (OUTPATIENT)
Dept: HOME HEALTH SERVICES | Facility: HOME HEALTHCARE | Age: 63
End: 2023-07-30
Payer: COMMERCIAL

## 2023-07-30 VITALS
SYSTOLIC BLOOD PRESSURE: 120 MMHG | HEART RATE: 78 BPM | RESPIRATION RATE: 20 BRPM | DIASTOLIC BLOOD PRESSURE: 70 MMHG | WEIGHT: 183 LBS | TEMPERATURE: 96.1 F | BODY MASS INDEX: 31.24 KG/M2 | OXYGEN SATURATION: 94 % | HEIGHT: 64 IN

## 2023-07-30 PROCEDURE — G0299 HHS/HOSPICE OF RN EA 15 MIN: HCPCS

## 2023-07-31 ENCOUNTER — TELEPHONE (OUTPATIENT)
Dept: FAMILY MEDICINE CLINIC | Facility: CLINIC | Age: 63
End: 2023-07-31

## 2023-07-31 DIAGNOSIS — Z71.89 COORDINATION OF COMPLEX CARE: Primary | ICD-10-CM

## 2023-07-31 DIAGNOSIS — Z90.49 S/P SMALL BOWEL RESECTION: Primary | ICD-10-CM

## 2023-07-31 RX ORDER — HYDROCODONE BITARTRATE AND ACETAMINOPHEN 5; 325 MG/1; MG/1
1 TABLET ORAL EVERY 6 HOURS PRN
Qty: 10 TABLET | Refills: 0 | Status: SHIPPED | OUTPATIENT
Start: 2023-07-31

## 2023-07-31 NOTE — TELEPHONE ENCOUNTER
Spoke to patient and told her that her script for oxycodone was prescribed by another physician and failed to got to the pharmacy, patient will call that physician and discuss taking oxycodone vs tramadol for her pain.

## 2023-07-31 NOTE — TELEPHONE ENCOUNTER
Patient also left a message asking for a refill of tramadol in addition to the ativan to help with pain, which is also not on her med list.

## 2023-07-31 NOTE — TELEPHONE ENCOUNTER
Called pharmacy and there was no medication called in for patient. Adali Sessions did call a script to [facility].  Patient notified

## 2023-07-31 NOTE — UTILIZATION REVIEW
NOTIFICATION OF ADMISSION DISCHARGE   This is a Notification of Discharge from [address] [place] [address]. Please be advised that this patient has been discharge from our facility. Below you will find the admission and discharge date and time including the patient’s disposition. UTILIZATION REVIEW CONTACT:  Bruce Mack  Utilization   Network Utilization Review Department  Phone: [phone] x carefully listen to the prompts. All voicemails are confidential.  Email: [email or web address]     ADMISSION INFORMATION  PRESENTATION DATE: 7/18/2023  9:37 PM  OBERVATION ADMISSION DATE:   INPATIENT ADMISSION DATE: 7/19/23  3:07 AM   DISCHARGE DATE: 7/28/2023  6:17 PM   DISPOSITION:Home with [address] INFORMATION:  Send all requests for admission clinical reviews, approved or denied determinations and any other requests to dedicated fax number below belonging to the campus where the patient is receiving treatment.  List of dedicated fax numbers:  [place] DENIALS (Administrative/Medical Necessity) [phone] 2303 [facility] (Maternity/NICU/Pediatrics) [phone]   [facility] [phone]   Formerly [facility] [phone] 1636 [facility] [phone]   [address] [place] [address] [phone]   [facility] [phone]   [address] [place] [address] [address] [place] [address] [phone]   [address] [place] [address] [phone] 3441 [facility] [phone] 2720 [facility] 3000 32[facility] [phone]

## 2023-07-31 NOTE — TELEPHONE ENCOUNTER
I called pt to set up a TCM appt. She states when she was D/C from the hospital they did not give her any pain meds. She is asking if you could refill her Tramadol and her Ativan. The Ativan is D/C in her med list.  [facility] on [employer]. I did schedule her for a TCM appt on 8/4/23 with Dr Tyson Starkey.

## 2023-08-01 ENCOUNTER — HOME CARE VISIT (OUTPATIENT)
Dept: HOME HEALTH SERVICES | Facility: HOME HEALTHCARE | Age: 63
End: 2023-08-01
Payer: COMMERCIAL

## 2023-08-01 ENCOUNTER — TELEPHONE (OUTPATIENT)
Dept: FAMILY MEDICINE CLINIC | Facility: CLINIC | Age: 63
End: 2023-08-01

## 2023-08-01 ENCOUNTER — PATIENT OUTREACH (OUTPATIENT)
Dept: FAMILY MEDICINE CLINIC | Facility: CLINIC | Age: 63
End: 2023-08-01

## 2023-08-01 ENCOUNTER — TRANSITIONAL CARE MANAGEMENT (OUTPATIENT)
Dept: FAMILY MEDICINE CLINIC | Facility: CLINIC | Age: 63
End: 2023-08-01

## 2023-08-01 VITALS
DIASTOLIC BLOOD PRESSURE: 78 MMHG | OXYGEN SATURATION: 97 % | SYSTOLIC BLOOD PRESSURE: 126 MMHG | RESPIRATION RATE: 18 BRPM | HEART RATE: 66 BPM | TEMPERATURE: 97.6 F

## 2023-08-01 VITALS — OXYGEN SATURATION: 98 % | HEART RATE: 85 BPM | DIASTOLIC BLOOD PRESSURE: 64 MMHG | SYSTOLIC BLOOD PRESSURE: 112 MMHG

## 2023-08-01 VITALS — DIASTOLIC BLOOD PRESSURE: 60 MMHG | OXYGEN SATURATION: 97 % | SYSTOLIC BLOOD PRESSURE: 105 MMHG | HEART RATE: 61 BPM

## 2023-08-01 DIAGNOSIS — M54.16 LUMBAR RADICULOPATHY: Primary | ICD-10-CM

## 2023-08-01 PROCEDURE — G0152 HHCP-SERV OF OT,EA 15 MIN: HCPCS

## 2023-08-01 PROCEDURE — G0151 HHCP-SERV OF PT,EA 15 MIN: HCPCS

## 2023-08-01 PROCEDURE — G0300 HHS/HOSPICE OF LPN EA 15 MIN: HCPCS

## 2023-08-01 RX ORDER — METHOCARBAMOL 500 MG/1
500 TABLET, FILM COATED ORAL 3 TIMES DAILY
Qty: 90 TABLET | Refills: 2 | Status: SHIPPED | OUTPATIENT
Start: 2023-08-01

## 2023-08-01 NOTE — PROGRESS NOTES
Contacted patient for f/u post hospitalization for exp lap with bowel resection. She is receiving home health services of SN and PT/OT. She lives alone but has support from her neighbors. Incisions are open to air and without redness or drainage. She is taking hydrocodone for pain. She is requesting robaxin and ativan. She states she was taken off ativan and her anxiety is very high. Informed her CM will contact office to request medications. She weighs daily and has lost weight following hospitalization. Weight today 179. She denies chest pain, sob or LE edema. We reviewed weight parameters to monitor and report. Her appetite is improving. She is moving her bowels. Reviewed medications and she is taking all as prescribed. Reminded her  to contact cardiology and surgeon to schedule f/u appointments. She has access to [employer] for transportation. She denies needing additional assistance at home. Ambulatory with walker. Explained the complex care management program and she is willing to participate. Provided my contact information, she is agreeable to continued outreach.

## 2023-08-01 NOTE — TELEPHONE ENCOUNTER
Maryann Manzano would like for you to Rx Robaxin since they D/C'd it in the hospital.  She was Rx's Norco but says Robaxin works better.     Also wants Ativan - her anxiety is really high

## 2023-08-02 ENCOUNTER — HOME CARE VISIT (OUTPATIENT)
Dept: HOME HEALTH SERVICES | Facility: HOME HEALTHCARE | Age: 63
End: 2023-08-02
Payer: COMMERCIAL

## 2023-08-02 DIAGNOSIS — F41.9 ANXIETY: Primary | ICD-10-CM

## 2023-08-02 RX ORDER — LORAZEPAM 0.5 MG/1
0.5 TABLET ORAL 2 TIMES DAILY PRN
Qty: 30 TABLET | Refills: 0 | Status: SHIPPED | OUTPATIENT
Start: 2023-08-02 | End: 2023-08-04 | Stop reason: SDUPTHER

## 2023-08-03 ENCOUNTER — HOME CARE VISIT (OUTPATIENT)
Dept: HOME HEALTH SERVICES | Facility: HOME HEALTHCARE | Age: 63
End: 2023-08-03
Payer: COMMERCIAL

## 2023-08-03 ENCOUNTER — OFFICE VISIT (OUTPATIENT)
Dept: SURGERY | Facility: CLINIC | Age: 63
End: 2023-08-03

## 2023-08-03 VITALS — SYSTOLIC BLOOD PRESSURE: 124 MMHG | OXYGEN SATURATION: 95 % | DIASTOLIC BLOOD PRESSURE: 70 MMHG | HEART RATE: 74 BPM

## 2023-08-03 VITALS
SYSTOLIC BLOOD PRESSURE: 128 MMHG | BODY MASS INDEX: 31.41 KG/M2 | HEIGHT: 64 IN | HEART RATE: 68 BPM | TEMPERATURE: 97.2 F | DIASTOLIC BLOOD PRESSURE: 82 MMHG | WEIGHT: 184 LBS

## 2023-08-03 DIAGNOSIS — Z09 POSTOP CHECK: Primary | ICD-10-CM

## 2023-08-03 PROCEDURE — 99024 POSTOP FOLLOW-UP VISIT: CPT | Performed by: SURGERY

## 2023-08-03 PROCEDURE — G0151 HHCP-SERV OF PT,EA 15 MIN: HCPCS

## 2023-08-03 NOTE — PROGRESS NOTES
Assessment/Plan:   Marisol Underwood is a 70 y. o.female who comes in today for postoperative check after LAPAROTOMY EXPLORATORY W/ BOWEL RESECTION. LAPAROSCOPY DIAGNOSTIC. LYSIS OF ADHESIONS. LAPARASCOPY TAKE TAKEDOWN OF LEFT COLON.  EXPLORATORY LAPAROSCOPY LYSIS OF ADHESIONS. RESECTION TRANSVERSE COLON , SPLENIC FLEXURE, . AND DESCENDING COLON . TAKE DOWN OF SPLENIC FLEXURE. SIGMOIDOSCOPY. MOBILIZATION OF RIGHT COLON. PRIMARY ANASTOMOSIS EEA 29. TAP BLOCK on 7/19/23 with Dr. Nella Thompson. Pathology: Reviewed with patient, all questions answered. Final Diagnosis   A. Transverse colon, splenic flexure, and descending colon, resection:  -   Transverse and descending colon with mucosal ischemic change, edema and hemorrhage.   -   Distal resection margin with mucosal ischemic change; proximal resection margin is viable. -   Portion of omentum with fat necrosis. -   Four benign lymph nodes and one calcified nodule.     B. Additional descending colon, resection:  -   Longer segment of colon with mucosal ischemic change and edema.   -   One resection margin with mucosal ischemic change; the other resection margin is viable. -   [place] segment of colon is viable. -   Portion of omentum with fat necrosis. Herbert Favre benign lymph nodes.            Postoperative restrictions reviewed. All questions answered. Patient has PCP appointment tomorrow. Follow up with GI. Follow up with us PRN. ______________________________________________________  HPI:  Marisol Underwood is a 70 y. o.female who comes in today for postoperative check after recent  on [address]. LYSIS OF ADHESIONS. LAPARASCOPY TAKE TAKEDOWN OF LEFT COLON.  EXPLORATORY LAPAROSCOPY LYSIS OF ADHESIONS. RESECTION TRANSVERSE COLON , SPLENIC FLEXURE, . AND DESCENDING COLON . TAKE DOWN OF SPLENIC FLEXURE. SIGMOIDOSCOPY. MOBILIZATION OF RIGHT COLON. PRIMARY ANASTOMOSIS EEA 29.  TAP BLOCK on 7/19/23 with  Grisel. Currently doing well with some problems : states the staples are painful, no fever or chills,no nausea and no vomiting. Normal bowel movements that are more soft then usual but no diarrhea. Patient is ambulating with a walker and having PT at home. Still use walker until PT graduates her. Patient overall is doing well. HPI: Sheri Huang is a 70yo female with pmhx significant for A fib- on eliquis, anxiety, asthma, chronic back pain, GERD, HTN, HLD who presented to the ER for GI bleeding. Patient was evaluated by GI and Ct was obtained which showed thickened colon in RAMBO territory. Patient had significant leukocytosis and lactic acidosis. She had GI do a flex sig and she was found to have ischemic in the watershed area. She was taken back to the OR and underwent below mentioned procedure.      Procedures Performed: LAPAROTOMY EXPLORATORY W/ BOWEL RESECTION.    LAPAROSCOPY DIAGNOSTIC. LYSIS OF ADHESIONS. LAPARASCOPY TAKE TAKEDOWN OF LEFT COLON.    EXPLORATORY LAPAROSCOPY LYSIS OF ADHESIONS.   RESECTION TRANSVERSE COLON , SPLENIC FLEXURE, . AND DESCENDING COLON . TAKE DOWN OF SPLENIC FLEXURE. SIGMOIDOSCOPY. MOBILIZATION OF RIGHT COLON. PRIMARY ANASTOMOSIS EEA 29. TAP BLOCK     Summary of Hospital Course: She underwent the procedure as mentioned above. Post op, she was admitted to critical care. She was kept NPO and started on antibiotics. She was on PCA for pain control. She had an NG tube as well. She was downgraded to Electronic Brailler. Cardiology was consulted to help manage A fib. NG was discontinued and patient was started on clear liquid diet. Cardiology started patient on amiodarone drip and anticoagulation(hep gtt )was restarted. Patient's pain was improved and her PCA was discontinued. Patient was started on low residue diet and she continued to tolerate diet and working with PT/OT. Pt was tolerating a diet and she was switched to doac and amiodarone.  Patient recommended post acute rehab and discharge planning was recommenced. Electrolytes were replenished. Patient refused rehab and she was sent home with home health care. Her antibiotic course was complicated. ROS:  General ROS: negative for - chills, fatigue, fever or night sweats, weight loss  Respiratory ROS: no cough, shortness of breath, or wheezing  Cardiovascular ROS: no chest pain or dyspnea on exertion  Genito-Urinary ROS: no dysuria, trouble voiding, or hematuria  Musculoskeletal ROS: negative for - gait disturbance, joint pain or muscle pain  Neurological ROS: no TIA or stroke symptoms  GI ROS: see HPI  Skin ROS: no new rashes or lesions   Lymphatic ROS: no new adenopathy noted by pt.    GYN ROS: see HPI, no new GYN history or bleeding noted  Psy ROS: no new mental or behavioral disturbances         Patient Active Problem List   Diagnosis   • Angina pectoris ([address])   • Essential hypertension   • Migraines   • AMD (age-related macular degeneration), bilateral   • Allergic reaction   • Gastroesophageal reflux disease without esophagitis   • Allergic conjunctivitis of both eyes   • Angio-edema   • Paroxysmal atrial fibrillation (HCC)   • Lumbar radiculopathy   • CAMILLE (obstructive sleep apnea)   • Hypertensive heart disease with congestive heart failure (HCC)   • Unspecified diastolic (congestive) heart failure (HCC)   • 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HCC)   • COPD (chronic obstructive pulmonary disease) ([address])   • Urinary retention   • Peripheral vascular disease ([address])   • Appendix disease   • Carotid artery stenosis   • Stenosis of left carotid artery   • Asymptomatic stenosis of left carotid artery   • Hepatic steatosis   • Partial facial nerve palsy   • Injury of left facial nerve   • Colitis   • Chronic migraine w/o aura w/o status migrainosus, not intractable   • IIH (idiopathic intracranial hypertension)   • Hematochezia   • Left carotid stenosis   • Colonic ischemia (HCC)   • Acute postoperative pain       Allergies:  Ace inhibitors, Benicar [olmesartan], Hydralazine, Other, Valsartan, Sulfa antibiotics, Ampicillin, Motrin [ibuprofen], and Wound dressing adhesive      Current Outpatient Medications:   •  acetaminophen (TYLENOL) 325 mg tablet, Take 2 tablets (650 mg total) by mouth every 6 (six) hours, Disp: , Rfl: 0  •  amiodarone 200 mg tablet, Take 1 tablet (200 mg total) by mouth daily Do not start before August 1, 2023., Disp: 30 tablet, Rfl: 0  •  Ascorbic Acid (vitamin C) 1000 MG tablet, Take 1 tablet (1,000 mg total) by mouth daily, Disp: 30 tablet, Rfl: 0  •  aspirin 81 mg chewable tablet, Chew 1 tablet (81 mg total) daily, Disp: , Rfl: 0  •  Cholecalciferol 25 MCG (1000 UT) tablet, Take 1,000 Units by mouth daily, Disp: , Rfl:   •  dexamethasone (DECADRON) 4 mg tablet, 1 tab PO with breakfast for 1 to 5 days for unrelenting migraine, Disp: 5 tablet, Rfl: 0  •  diltiazem (CARDIZEM CD) 120 mg 24 hr capsule, Take 1 capsule (120 mg total) by mouth daily, Disp: 30 capsule, Rfl: 0  •  Docusate Sodium (COLACE PO), Take by mouth daily at bedtime, Disp: , Rfl:   •  Eliquis 5 MG, TAKE ONE TABLET BY MOUTH TWICE DAILY, Disp: 180 tablet, Rfl: 0  •  famotidine (PEPCID) 20 mg tablet, Take 20 mg by mouth daily at bedtime, Disp: , Rfl:   •  fexofenadine (ALLEGRA) 180 MG tablet, Take 180 mg by mouth daily, Disp: , Rfl:   •  fluticasone-vilanterol (BREO ELLIPTA) 200-25 MCG/INH inhaler, Inhale 1 puff daily Rinse mouth after use., Disp: 3 Inhaler, Rfl: 3  •  furosemide (LASIX) 40 mg tablet, Take 1 tablet (40 mg total) by mouth daily, Disp: 90 tablet, Rfl: 0  •  HYDROcodone-acetaminophen (Norco) 5-325 mg per tablet, Take 1 tablet by mouth every 6 (six) hours as needed for pain Max Daily Amount: 4 tablets, Disp: 10 tablet, Rfl: 0  •  hydrOXYzine HCL (ATARAX) 25 mg tablet, TAKE TWO TABLETS BY MOUTH DAILY AT BEDTIME, Disp: 90 tablet, Rfl: 0  •  LORazepam (Ativan) 0.5 mg tablet, Take 1 tablet (0.5 mg total) by mouth 2 (two) times a day as needed for anxiety, Disp: 30 tablet, Rfl: 0  •  methocarbamol (ROBAXIN) 500 mg tablet, Take 1 tablet (500 mg total) by mouth 3 (three) times a day, Disp: 90 tablet, Rfl: 2  •  metoprolol succinate (TOPROL-XL) 100 mg 24 hr tablet, Take 1 tablet (100 mg total) by mouth 2 (two) times a day, Disp: 180 tablet, Rfl: 0  •  montelukast (SINGULAIR) 10 mg tablet, TAKE ONE TABLET BY MOUTH DAILY AT BEDTIME, Disp: 90 tablet, Rfl: 0  •  MULTIPLE VITAMIN PO, Take by mouth, Disp: , Rfl:   •  promethazine (PHENERGAN) 25 mg tablet, Take 1 tablet (25 mg total) by mouth every 6 (six) hours as needed for nausea or vomiting, Disp: 60 tablet, Rfl: 0  •  Repatha SureClick 998 MG/ML SOAJ, Inject 140mg under the skin every 2 weeks. , Disp: 2 mL, Rfl: 11  •  spironolactone (ALDACTONE) 25 mg tablet, TAKE ONE TABLET BY MOUTH TWICE DAILY, Disp: 180 tablet, Rfl: 0  •  sucralfate (CARAFATE) 1 g tablet, Take 1 tablet (1 g total) by mouth 2 (two) times a day before meals, Disp: 60 tablet, Rfl: 5  •  amiodarone (PACERONE) 400 MG tablet, Take 1 tablet (400 mg total) by mouth 2 (two) times a day for 3 days, Disp: 6 tablet, Rfl: 0  •  ascorbic acid (VITAMIN C) 1000 MG tablet, Take 1,000 mg by mouth daily, Disp: , Rfl:   •  Biotin w/ Vitamins C & E (HAIR/SKIN/NAILS PO), Take by mouth (Patient not taking: Reported on 8/3/2023), Disp: , Rfl:   •  clotrimazole-betamethasone (LOTRISONE) 1-0.05 % cream, , Disp: , Rfl:     Current Facility-Administered Medications:   •  cyanocobalamin injection 1,000 mcg, 1,000 mcg, Intramuscular, Q30 Days, Christopher Graham DO, 1,000 mcg at 07/07/23 1052    Past Medical History:   Diagnosis Date   • A-fib ([address]) 03/2020   • Allergic    • Anxiety    • Arthritis    • Asthma    • Back pain     and muscle pain   • Bruises easily    • Chronic pain disorder     Interstitial pain   • Colon polyp    • Dental bridge present    • Depression    • Eczema    • GERD (gastroesophageal reflux disease)    • Heart murmur    • History of blood clots     per pt "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"   • History of pneumonia    • Hives    • Hyperlipidemia    • Hypertension    • Infertility, female    • Interstitial cystitis    • Migraine     sees neurologist   • Motion sickness    • Palpitations    • PONV (postoperative nausea and vomiting)    • Premature atrial contractions 11/9/2022   • Psychiatric disorder    • TIA (transient ischemic attack) 09/2022    per pt --"had MRI and 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"   • Urinary incontinence     wears Pads   • Venous insufficiency 8/1/2017   • Wears glasses        Past Surgical History:   Procedure Laterality Date   • COLONOSCOPY     • DILATION AND CURETTAGE OF UTERUS     • EGD     • EXPLORATORY LAPAROTOMY      for infertility   • EXPLORATORY LAPAROTOMY W/ BOWEL RESECTION N/A 7/19/2023    Procedure: LAPAROTOMY EXPLORATORY W/ BOWEL RESECTION;  Surgeon: Atanacio Bamberger, MD;  Location: [facility];  Service: General   • HAND SURGERY      Hand excision of tendon cyst   • IL LAPAROSCOPIC APPENDECTOMY N/A 05/03/2022    Procedure: APPENDECTOMY LAPAROSCOPIC;  Surgeon:  Anushka Burrell MD;  Location: [facility];  Service: General   • IL LAPS ABD PRTM&OMENTUM DX W/WO SPEC BR/WA SPX N/A 7/19/2023    Procedure: LAPAROSCOPY DIAGNOSTIC, LYSIS OF ADHESIONS, LAPAROSCOPY TAKE TAKEDOWN OF LEFT COLON, EXPLORATORY LAPAROSCOPY, LYSIS OF ADHESIONS, RESECTION OF TRANSVERSE COLON SPLENIC FLEXURE AND DESCENDING COLON , TAKE DOWN OF SPLENIC FLEXURE, SIGMOIDOSCOPY, MOBILIZATION OF RIGHT COLON, PRIMARY ANASTOMOSIS EEA 34, TAP BLOCK;  Surgeon: Atanacio Bamberger, MD;  Location[facility];  Service: General   • IL TEAEC W/PATCH GRF CAROTID VERTB [address] Left 1/31/2023    Procedure: EXPLORATION OF LEFT CAROTID ARTERY; ABORTED ENDARTERECTOMY ARTERY CAROTID;  Surgeon: Aristides Mejia DO;  Location: [facility];  Service: Vascular   • SUPERIOR MESTENTERIC ARTERY STENT     • WISDOM TOOTH EXTRACTION         Family History   Problem Relation Age of Onset   • Lung cancer Mother 61   • Brain cancer Mother 61   • Diabetes Father    • Depression Father    • Other Father         septic   • Allergies Sister    • Hashimoto's thyroiditis Sister    • Abdominal aortic aneurysm Sister    • Diabetes Sister    • No Known Problems Sister    • No Known Problems Maternal Grandmother    • No Known Problems Maternal Grandfather    • No Known Problems Paternal Grandmother    • No Known Problems Paternal Grandfather    • Hodgkin's lymphoma Brother    • No Known Problems Brother    • No Known Problems Maternal Aunt    • Diabetes Other         reports that she quit smoking about 4 years ago. Her smoking use included cigarettes. She has a 5.00 pack-year smoking history. She has never been exposed to tobacco smoke. She has never used smokeless tobacco. She reports that she does not currently use alcohol. She reports that she does not use drugs. Vitals:    08/03/23 1400   BP: 128/82   Pulse: 68   Temp: (!) 97.2 °F (36.2 °C)       PHYSICAL EXAM  General: normal, cooperative, no distress  Abdominal: soft, nondistended or nontender  Incision: clean, dry, and intact and healing well    Some portions of this record may have been generated with voice recognition software. There may be translation, syntax,  or grammatical errors. Occasional wrong word or "sound-a-like" substitutions may have occurred due to the inherent limitations of the voice recognition software. Read the chart carefully and recognize, using context, where substitutions may have occurred. If you have any questions, please contact the dictating provider for clarification or correction, as needed. This encounter has been coded by a non-certified coder.        Sb Avalos PA-C    Date: 8/3/2023 Time: 2:17 PM

## 2023-08-04 ENCOUNTER — OFFICE VISIT (OUTPATIENT)
Dept: FAMILY MEDICINE CLINIC | Facility: CLINIC | Age: 63
End: 2023-08-04
Payer: COMMERCIAL

## 2023-08-04 VITALS
DIASTOLIC BLOOD PRESSURE: 68 MMHG | HEART RATE: 67 BPM | OXYGEN SATURATION: 97 % | TEMPERATURE: 97.5 F | WEIGHT: 180.4 LBS | SYSTOLIC BLOOD PRESSURE: 124 MMHG | HEIGHT: 64 IN | BODY MASS INDEX: 30.8 KG/M2

## 2023-08-04 DIAGNOSIS — K55.069 MESENTERIC ARTERY THROMBOSIS (HCC): ICD-10-CM

## 2023-08-04 DIAGNOSIS — Z90.49 S/P SMALL BOWEL RESECTION: ICD-10-CM

## 2023-08-04 DIAGNOSIS — F41.9 ANXIETY: ICD-10-CM

## 2023-08-04 DIAGNOSIS — M54.16 LUMBAR RADICULOPATHY: Primary | ICD-10-CM

## 2023-08-04 DIAGNOSIS — I48.0 PAROXYSMAL ATRIAL FIBRILLATION (HCC): ICD-10-CM

## 2023-08-04 PROCEDURE — 99495 TRANSJ CARE MGMT MOD F2F 14D: CPT | Performed by: FAMILY MEDICINE

## 2023-08-04 RX ORDER — TRAMADOL HYDROCHLORIDE 50 MG/1
50 TABLET ORAL EVERY 8 HOURS PRN
Qty: 60 TABLET | Refills: 0 | Status: SHIPPED | OUTPATIENT
Start: 2023-08-04 | End: 2023-08-15 | Stop reason: SDUPTHER

## 2023-08-04 RX ORDER — LORAZEPAM 1 MG/1
1 TABLET ORAL EVERY 8 HOURS PRN
Qty: 90 TABLET | Refills: 0 | Status: SHIPPED | OUTPATIENT
Start: 2023-08-04

## 2023-08-04 RX ORDER — APIXABAN 5 MG/1
TABLET, FILM COATED ORAL
Qty: 180 TABLET | Refills: 0 | Status: SHIPPED | OUTPATIENT
Start: 2023-08-04

## 2023-08-04 RX ORDER — NALOXONE HYDROCHLORIDE 4 MG/.1ML
SPRAY NASAL
Qty: 1 EACH | Refills: 1 | Status: SHIPPED | OUTPATIENT
Start: 2023-08-04 | End: 2024-08-03

## 2023-08-07 ENCOUNTER — HOME CARE VISIT (OUTPATIENT)
Dept: HOME HEALTH SERVICES | Facility: HOME HEALTHCARE | Age: 63
End: 2023-08-07
Payer: COMMERCIAL

## 2023-08-07 VITALS — OXYGEN SATURATION: 95 % | DIASTOLIC BLOOD PRESSURE: 80 MMHG | HEART RATE: 63 BPM | SYSTOLIC BLOOD PRESSURE: 128 MMHG

## 2023-08-07 PROCEDURE — G0151 HHCP-SERV OF PT,EA 15 MIN: HCPCS

## 2023-08-08 ENCOUNTER — HOME CARE VISIT (OUTPATIENT)
Dept: HOME HEALTH SERVICES | Facility: HOME HEALTHCARE | Age: 63
End: 2023-08-08
Payer: COMMERCIAL

## 2023-08-08 VITALS
TEMPERATURE: 98 F | DIASTOLIC BLOOD PRESSURE: 70 MMHG | SYSTOLIC BLOOD PRESSURE: 110 MMHG | HEART RATE: 68 BPM | RESPIRATION RATE: 16 BRPM | OXYGEN SATURATION: 96 %

## 2023-08-08 DIAGNOSIS — I48.0 PAROXYSMAL ATRIAL FIBRILLATION (HCC): ICD-10-CM

## 2023-08-08 PROBLEM — Z90.49 S/P SMALL BOWEL RESECTION: Status: ACTIVE | Noted: 2023-08-08

## 2023-08-08 PROBLEM — F41.9 ANXIETY: Status: ACTIVE | Noted: 2023-08-08

## 2023-08-08 PROCEDURE — G0299 HHS/HOSPICE OF RN EA 15 MIN: HCPCS

## 2023-08-08 NOTE — PROGRESS NOTES
Assessment & Plan     1. Lumbar radiculopathy  -     traMADol (ULTRAM) 50 mg tablet; Take 1 tablet (50 mg total) by mouth every 8 (eight) hours as needed for severe pain    2. Anxiety  -     LORazepam (Ativan) 1 mg tablet; Take 1 tablet (1 mg total) by mouth every 8 (eight) hours as needed for anxiety  -     naloxone (NARCAN) 4 mg/0.1 mL nasal spray; Administer 1 spray into a nostril. If no response after 2-3 minutes, give another dose in the other nostril using a new spray. -     traMADol (ULTRAM) 50 mg tablet; Take 1 tablet (50 mg total) by mouth every 8 (eight) hours as needed for severe pain    3. S/P small bowel resection  -     traMADol (ULTRAM) 50 mg tablet; Take 1 tablet (50 mg total) by mouth every 8 (eight) hours as needed for severe pain    4. Mesenteric artery thrombosis [facility])      41-year-old woman with: Transitional care management visit for patient with partial bowel resection anxiety mesenteric artery thrombosis and lumbar radicular symptoms  to be reviewed and medications were refilled discussed working return parameters encourage follow-up with her surgeon advised patient to call back if symptoms are not improving otherwise follow-up with Dr. Velázquez Anis    BMI Counseling: Body mass index is 30.97 kg/m². The BMI is above normal. Nutrition recommendations include encouraging healthy choices of fruits and vegetables. Exercise recommendations include moderate physical activity 150 minutes/week. Rationale for BMI follow-up plan is due to patient being overweight or obese. Depression Screening and Follow-up Plan: Patient was screened for depression during today's encounter. They screened negative with a PHQ-2 score of 0. Subjective     Transitional Care Management Review:   Brittney Antoine is a 70 y.o. female here for TCM follow up.      During the TCM phone call patient stated:  TCM Call     Date and time call was made  7/31/2023 10:16 AM    Hospital care reviewed  Records reviewed    Patient was hospitialized at  [facility]    Date of Admission  07/18/23    Date of discharge  07/28/23    Diagnosis  COLONIC ISCHEMIA    Disposition  Home    Were the patients medications reviewed and updated  Yes    Current Symptoms  None      TCM Call     Post hospital issues  None    Should patient be enrolled in anticoag monitoring? No    Scheduled for follow up? Yes    Patients specialists  Neurologist    Neurologist name  Follow up with [facility] (Neurology); Please follow up with Neurology. The  will call you to set up an appointment. If you do not hear from the  within 1 week,    Did you obtain your prescribed medications  Yes    Do you need help managing your prescriptions or medications  No    Is transportation to your appointment needed  No    I have advised the patient to call PCP with any new or worsening symptoms  ERMA CARDENAS, [address] [place], [place] [address]y 6 Arrangements  Family members    Support System  Family    The type of support provided  Emotional; Financial; Physical    Do you have social support  Yes, as much as I need    Are you recieving any outpatient services  No    What type of services  NURSES    Are you recieving home care services  No    Types of home care services  Nurse visit    Are you using any community resources  No    Current waiver services  No    Have you fallen in the last 12 months  No    Interperter language line needed  No    Counseling  Patient        Patient is a 68-year-old woman transitional care management visit patient had episode of abdominal pain with ischemia and was treated with partial bowel resection with the patient also with anxiety and lumbar radicular symptoms patient admits that she is doing better tolerating some p.o. intake her pain is slowly improving no fevers chills nausea vomiting    Review of Systems   Constitutional: Negative. HENT: Negative. Eyes: Negative. Respiratory: Negative. Cardiovascular: Negative. Gastrointestinal: Negative. Endocrine: Negative. Genitourinary: Negative. Musculoskeletal: Negative. Allergic/Immunologic: Negative. Neurological: Negative. Hematological: Negative. Psychiatric/Behavioral: Negative. All other systems reviewed and are negative. Objective     /68 (BP Location: Left arm, Patient Position: Sitting, Cuff Size: Standard)   Pulse 67   Temp 97.5 °F (36.4 °C) (Tympanic)   Ht 5' 4" (1.626 m)   Wt 81.8 kg (180 lb 6.4 oz)   LMP  (LMP Unknown)   SpO2 97%   BMI 30.97 kg/m²      Physical Exam  Constitutional:       General: She is not in acute distress. Appearance: She is well-developed. She is not diaphoretic. HENT:      Head: Normocephalic and atraumatic. Right Ear: External ear normal.      Left Ear: External ear normal.      Nose: Nose normal.      Mouth/Throat:      Pharynx: No oropharyngeal exudate. Eyes:      General:         Right eye: No discharge. Left eye: No discharge. Conjunctiva/sclera: Conjunctivae normal.      Pupils: Pupils are equal, round, and reactive to light. Neck:      Thyroid: No thyromegaly. Trachea: No tracheal deviation. Cardiovascular:      Rate and Rhythm: Normal rate and regular rhythm. Heart sounds: Normal heart sounds. No murmur heard. No friction rub. No gallop. Pulmonary:      Effort: Pulmonary effort is normal. No respiratory distress. Breath sounds: Normal breath sounds. Abdominal:      General: There is no distension. Palpations: Abdomen is soft. Tenderness: There is no abdominal tenderness. There is no guarding or rebound. Musculoskeletal:         General: Normal range of motion. Lymphadenopathy:      Cervical: No cervical adenopathy. Skin:     General: Skin is warm. Neurological:      Mental Status: She is alert and oriented to person, place, and time. Cranial Nerves: No cranial nerve deficit.    Psychiatric:         Behavior: Behavior normal.         Thought Content:  Thought content normal.         Judgment: Judgment normal.       Medications have been reviewed by provider in current encounter    Saida Campos MD

## 2023-08-09 ENCOUNTER — TELEPHONE (OUTPATIENT)
Dept: HOME HEALTH SERVICES | Facility: HOME HEALTHCARE | Age: 63
End: 2023-08-09

## 2023-08-09 ENCOUNTER — TELEPHONE (OUTPATIENT)
Dept: SURGERY | Facility: CLINIC | Age: 63
End: 2023-08-09

## 2023-08-09 DIAGNOSIS — R11.0 NAUSEA: ICD-10-CM

## 2023-08-09 RX ORDER — PROMETHAZINE HYDROCHLORIDE 25 MG/1
25 TABLET ORAL EVERY 6 HOURS PRN
Qty: 60 TABLET | Refills: 0 | Status: SHIPPED | OUTPATIENT
Start: 2023-08-09 | End: 2023-10-08

## 2023-08-09 RX ORDER — AMIODARONE HYDROCHLORIDE 200 MG/1
200 TABLET ORAL DAILY
Qty: 30 TABLET | Refills: 0 | Status: SHIPPED | OUTPATIENT
Start: 2023-08-09 | End: 2023-09-08

## 2023-08-09 NOTE — TELEPHONE ENCOUNTER
Patient is having a hard time twisting and turning. She has pain where the stitches were. She would like some kind of support other than a girdle.

## 2023-08-10 ENCOUNTER — HOME CARE VISIT (OUTPATIENT)
Dept: HOME HEALTH SERVICES | Facility: HOME HEALTHCARE | Age: 63
End: 2023-08-10
Payer: COMMERCIAL

## 2023-08-10 VITALS — DIASTOLIC BLOOD PRESSURE: 88 MMHG | SYSTOLIC BLOOD PRESSURE: 132 MMHG | HEART RATE: 66 BPM | OXYGEN SATURATION: 96 %

## 2023-08-10 PROCEDURE — G0152 HHCP-SERV OF OT,EA 15 MIN: HCPCS

## 2023-08-10 PROCEDURE — G0300 HHS/HOSPICE OF LPN EA 15 MIN: HCPCS

## 2023-08-11 ENCOUNTER — HOME CARE VISIT (OUTPATIENT)
Dept: HOME HEALTH SERVICES | Facility: HOME HEALTHCARE | Age: 63
End: 2023-08-11
Payer: COMMERCIAL

## 2023-08-11 VITALS
SYSTOLIC BLOOD PRESSURE: 122 MMHG | TEMPERATURE: 97.7 F | DIASTOLIC BLOOD PRESSURE: 74 MMHG | RESPIRATION RATE: 16 BRPM | HEART RATE: 64 BPM | OXYGEN SATURATION: 97 %

## 2023-08-11 VITALS — OXYGEN SATURATION: 93 % | SYSTOLIC BLOOD PRESSURE: 128 MMHG | HEART RATE: 70 BPM | DIASTOLIC BLOOD PRESSURE: 68 MMHG

## 2023-08-11 PROCEDURE — G0151 HHCP-SERV OF PT,EA 15 MIN: HCPCS

## 2023-08-11 NOTE — CASE COMMUNICATION
Good morning,     I had a home visit with this patient and wanted to update you. She has a small open area in the incision at her navel. I uploaded a photo to the media tab. She was not aware that it was open, therefore there must be minimal drainage. There are no ss of infection present and she was afebrile. I instructed her to cleanse with soap and water and keep the area very dry. Apply dry clean guaze dressing for drainage. Please a dvise if there is a different wound care you would like us to instruct her to perform.     Thank you,  Rashaad Antony

## 2023-08-14 ENCOUNTER — TELEPHONE (OUTPATIENT)
Dept: NEUROLOGY | Facility: CLINIC | Age: 63
End: 2023-08-14

## 2023-08-14 ENCOUNTER — HOME CARE VISIT (OUTPATIENT)
Dept: HOME HEALTH SERVICES | Facility: HOME HEALTHCARE | Age: 63
End: 2023-08-14
Payer: COMMERCIAL

## 2023-08-14 VITALS — SYSTOLIC BLOOD PRESSURE: 130 MMHG | HEART RATE: 66 BPM | DIASTOLIC BLOOD PRESSURE: 80 MMHG | OXYGEN SATURATION: 98 %

## 2023-08-14 PROCEDURE — G0151 HHCP-SERV OF PT,EA 15 MIN: HCPCS

## 2023-08-14 NOTE — TELEPHONE ENCOUNTER
Good morning, this is Suzi Favorite  F r E Y. YOB: 1960. I have A scheduled appointment on Wednesday morning with Yanna Kolb, but I just wanted to see how important it was to come because july 19th  they took out my large intestine  so I'm not doing too well. So I really would like to reschedule. So if you can give me a call back.  [phone]    Last visit Dr. Eugene Sweeney 6/28  f/u scheduled 8/16 Yanna Kolb    Patient said she is still recovering from surgery and not able to come in person or do a telemedicine visit. She is asking to reschedule; rescheduled for 10/11.

## 2023-08-15 ENCOUNTER — HOME CARE VISIT (OUTPATIENT)
Dept: HOME HEALTH SERVICES | Facility: HOME HEALTHCARE | Age: 63
End: 2023-08-15
Payer: COMMERCIAL

## 2023-08-15 ENCOUNTER — OFFICE VISIT (OUTPATIENT)
Dept: FAMILY MEDICINE CLINIC | Facility: CLINIC | Age: 63
End: 2023-08-15
Payer: MEDICARE

## 2023-08-15 VITALS
BODY MASS INDEX: 30.73 KG/M2 | HEIGHT: 64 IN | DIASTOLIC BLOOD PRESSURE: 88 MMHG | OXYGEN SATURATION: 97 % | WEIGHT: 180 LBS | HEART RATE: 64 BPM | TEMPERATURE: 98.4 F | SYSTOLIC BLOOD PRESSURE: 128 MMHG

## 2023-08-15 DIAGNOSIS — K55.069 MESENTERIC ARTERY THROMBOSIS (HCC): ICD-10-CM

## 2023-08-15 DIAGNOSIS — I48.0 PAROXYSMAL ATRIAL FIBRILLATION (HCC): ICD-10-CM

## 2023-08-15 DIAGNOSIS — Z90.49 S/P SMALL BOWEL RESECTION: ICD-10-CM

## 2023-08-15 DIAGNOSIS — M54.16 LUMBAR RADICULOPATHY: ICD-10-CM

## 2023-08-15 DIAGNOSIS — I73.9 PERIPHERAL VASCULAR DISEASE (HCC): ICD-10-CM

## 2023-08-15 DIAGNOSIS — F41.9 ANXIETY: ICD-10-CM

## 2023-08-15 DIAGNOSIS — K55.9 COLONIC ISCHEMIA (HCC): Primary | ICD-10-CM

## 2023-08-15 PROCEDURE — 99214 OFFICE O/P EST MOD 30 MIN: CPT | Performed by: FAMILY MEDICINE

## 2023-08-15 RX ORDER — TRAMADOL HYDROCHLORIDE 50 MG/1
100 TABLET ORAL EVERY 8 HOURS PRN
Qty: 180 TABLET | Refills: 0 | Status: SHIPPED | OUTPATIENT
Start: 2023-08-15

## 2023-08-15 NOTE — PROGRESS NOTES
Assessment/Plan: Patient will follow-up with surgery appropriately. Patient will follow-up with cardiology appropriately. Hospital records reviewed. Patient will continue with current regimen of medications. Patient may use tramadol as needed for pain. Refills given at this time. Diagnoses and all orders for this visit:    Colonic ischemia ([address])    Anxiety  -     traMADol (ULTRAM) 50 mg tablet; Take 2 tablets (100 mg total) by mouth every 8 (eight) hours as needed for severe pain    S/P small bowel resection  -     traMADol (ULTRAM) 50 mg tablet; Take 2 tablets (100 mg total) by mouth every 8 (eight) hours as needed for severe pain    Lumbar radiculopathy  -     traMADol (ULTRAM) 50 mg tablet; Take 2 tablets (100 mg total) by mouth every 8 (eight) hours as needed for severe pain    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HCC)    Peripheral vascular disease (HCC)    Paroxysmal atrial fibrillation (HCC)            Subjective:        Patient ID: Nneka Shipman is a 70 y.o. female. Patient is here status post hospitalization from July 18 to 28 for GI bleed due to ischemic colitis. Other diagnoses include elevated lactic acid leukocytosis hypertension hyperlipidemia A-fib. Patient did have CAT scan and surgery and cardiology were consulted. Patient did have exploratory laparoscopy and bowel resection done. CT scan showed abnormalities associated with the bowel associated with RAMBO. No further rectal bleeding. Patient is having some abdominal pain on the left. Patient with small bowel movements but daily. Patient with nausea but no vomiting. No fever. Patient also with slight cough and rhinorrhea. Patient tearful and upset        The following portions of the patient's history were reviewed and updated as appropriate: allergies, current medications, past family history, past medical history, past social history, past surgical history and problem list.      Review of Systems   Constitutional: Negative. HENT: Positive for rhinorrhea. Eyes: Negative. Respiratory: Positive for cough. Cardiovascular: Negative. Gastrointestinal: Positive for abdominal pain and nausea. Endocrine: Negative. Genitourinary: Negative. Musculoskeletal: Negative. Skin: Negative. Allergic/Immunologic: Negative. Neurological: Negative. Hematological: Negative. Psychiatric/Behavioral: Positive for dysphoric mood. Objective:               /88 (BP Location: Right arm, Patient Position: Sitting, Cuff Size: Standard)   Pulse 64   Temp 98.4 °F (36.9 °C) (Temporal)   Ht 5' 4" (1.626 m)   Wt 81.6 kg (180 lb)   LMP  (LMP Unknown)   SpO2 97%   BMI 30.90 kg/m²          Physical Exam  Vitals and nursing note reviewed. Constitutional:       General: She is not in acute distress. Appearance: Normal appearance. She is not ill-appearing, toxic-appearing or diaphoretic. HENT:      Head: Normocephalic and atraumatic. Right Ear: Tympanic membrane, ear canal and external ear normal. There is no impacted cerumen. Left Ear: Tympanic membrane, ear canal and external ear normal. There is no impacted cerumen. Nose: Nose normal. No congestion or rhinorrhea. Mouth/Throat:      Mouth: Mucous membranes are moist.      Pharynx: No oropharyngeal exudate or posterior oropharyngeal erythema. Eyes:      General: No scleral icterus. Right eye: No discharge. Left eye: No discharge. Neck:      Vascular: No carotid bruit. Cardiovascular:      Rate and Rhythm: Normal rate and regular rhythm. Pulses: Normal pulses. Heart sounds: Normal heart sounds. No murmur heard. No friction rub. No gallop. Pulmonary:      Effort: Pulmonary effort is normal. No respiratory distress. Breath sounds: Normal breath sounds. No stridor. No wheezing, rhonchi or rales. Chest:      Chest wall: No tenderness.    Musculoskeletal:         General: No swelling, tenderness, deformity or signs of injury. Normal range of motion. Cervical back: Normal range of motion and neck supple. No rigidity. No muscular tenderness. Right lower leg: No edema. Left lower leg: No edema. Lymphadenopathy:      Cervical: No cervical adenopathy. Skin:     General: Skin is warm and dry. Capillary Refill: Capillary refill takes less than 2 seconds. Coloration: Skin is not jaundiced. Findings: No bruising, erythema, lesion or rash. Neurological:      Mental Status: She is alert and oriented to person, place, and time. Mental status is at baseline. Cranial Nerves: No cranial nerve deficit. Sensory: No sensory deficit. Motor: No weakness. Coordination: Coordination normal.      Gait: Gait normal.   Psychiatric:         Behavior: Behavior normal.         Thought Content:  Thought content normal.         Judgment: Judgment normal.

## 2023-08-16 ENCOUNTER — HOME CARE VISIT (OUTPATIENT)
Dept: HOME HEALTH SERVICES | Facility: HOME HEALTHCARE | Age: 63
End: 2023-08-16
Payer: COMMERCIAL

## 2023-08-16 VITALS — OXYGEN SATURATION: 96 % | DIASTOLIC BLOOD PRESSURE: 76 MMHG | HEART RATE: 54 BPM | SYSTOLIC BLOOD PRESSURE: 122 MMHG

## 2023-08-16 PROCEDURE — G0151 HHCP-SERV OF PT,EA 15 MIN: HCPCS

## 2023-08-18 ENCOUNTER — TELEPHONE (OUTPATIENT)
Dept: SURGERY | Facility: CLINIC | Age: 63
End: 2023-08-18

## 2023-08-18 ENCOUNTER — HOME CARE VISIT (OUTPATIENT)
Dept: HOME HEALTH SERVICES | Facility: HOME HEALTHCARE | Age: 63
End: 2023-08-18
Payer: COMMERCIAL

## 2023-08-18 VITALS
HEART RATE: 68 BPM | RESPIRATION RATE: 20 BRPM | TEMPERATURE: 98.2 F | OXYGEN SATURATION: 95 % | DIASTOLIC BLOOD PRESSURE: 82 MMHG | SYSTOLIC BLOOD PRESSURE: 132 MMHG

## 2023-08-18 DIAGNOSIS — I10 BENIGN ESSENTIAL HYPERTENSION: ICD-10-CM

## 2023-08-18 PROCEDURE — G0299 HHS/HOSPICE OF RN EA 15 MIN: HCPCS

## 2023-08-18 RX ORDER — SPIRONOLACTONE 25 MG/1
TABLET ORAL
Qty: 180 TABLET | Refills: 0 | Status: SHIPPED | OUTPATIENT
Start: 2023-08-18

## 2023-08-18 NOTE — TELEPHONE ENCOUNTER
Dr Katiana Shah said pt could come in on Monday, 8/21, which she is.  said to tell pt if she is having any fever, chills, nausea, vomiting, red pain at the incision, to go to the ER. Pt then said she was having pain on the L side where the surgery was done, and "weeping" near her belly button.  then said the pt can come in on Monday. Pt said she understood the above.

## 2023-08-21 ENCOUNTER — OFFICE VISIT (OUTPATIENT)
Dept: SURGERY | Facility: CLINIC | Age: 63
End: 2023-08-21

## 2023-08-21 VITALS
BODY MASS INDEX: 30.7 KG/M2 | HEIGHT: 64 IN | TEMPERATURE: 97.6 F | DIASTOLIC BLOOD PRESSURE: 80 MMHG | WEIGHT: 179.8 LBS | SYSTOLIC BLOOD PRESSURE: 130 MMHG | HEART RATE: 78 BPM

## 2023-08-21 DIAGNOSIS — Z90.49 S/P SMALL BOWEL RESECTION: ICD-10-CM

## 2023-08-21 DIAGNOSIS — Z51.89 ENCOUNTER FOR WOUND CARE: Primary | ICD-10-CM

## 2023-08-21 DIAGNOSIS — K55.069 MESENTERIC ARTERY THROMBOSIS (HCC): ICD-10-CM

## 2023-08-21 DIAGNOSIS — K55.9 COLONIC ISCHEMIA (HCC): ICD-10-CM

## 2023-08-21 DIAGNOSIS — I48.0 PAROXYSMAL ATRIAL FIBRILLATION (HCC): ICD-10-CM

## 2023-08-21 PROCEDURE — 99024 POSTOP FOLLOW-UP VISIT: CPT | Performed by: SURGERY

## 2023-08-21 RX ORDER — DILTIAZEM HYDROCHLORIDE 120 MG/1
120 CAPSULE, COATED, EXTENDED RELEASE ORAL DAILY
Qty: 30 CAPSULE | Refills: 0 | Status: SHIPPED | OUTPATIENT
Start: 2023-08-21

## 2023-08-21 NOTE — PROGRESS NOTES
Assessment/Plan:   Mayela Grace is a 70 y. o.female who comes in today for postoperative check after LAPAROTOMY EXPLORATORY W/ BOWEL RESECTION. LAPAROSCOPY DIAGNOSTIC. LYSIS OF ADHESIONS. LAPARASCOPY TAKE TAKEDOWN OF LEFT COLON.  EXPLORATORY LAPAROSCOPY LYSIS OF ADHESIONS. RESECTION TRANSVERSE COLON , SPLENIC FLEXURE, . AND DESCENDING COLON . TAKE DOWN OF SPLENIC FLEXURE. SIGMOIDOSCOPY. MOBILIZATION OF RIGHT COLON. PRIMARY ANASTOMOSIS EEA 29. TAP BLOCK on 7/19/23 with Dr. Cheryle Pascual. Here for concerns regarding her wound. no fevers, chills, nausea or vomiting. Move her bowels almost every day occasional constipation. Actually doing remarkably well and has lost 12 pounds. The wound weeps a little bit but on examination there is no infection in fact it looks marvelous. There is a little bit of proud flesh at the umbilicus but no evidence of drainage, wound dehiscence or other problems with the wound. I did cauterize the wound and dress it and this should heal in the next few weeks. Her last colonoscopy was July 2022 and was fairly normal other than diverticulosis. Her history of colon polyps prior to her 2019 colonoscopy. My assumption that she would probably come back in [id number] for follow-up colonoscopy. She can follow-up with GI. Otherwise she is continue to her regular medical care and cardiac care.          ______________________________________________________  HPI:  Mayela Grace is a 70 y. o.female who comes in today for postoperative check after recent recent laparotomy.     Procedures Performed:   ROS:  General ROS: negative for - chills, fatigue, fever or night sweats, weight loss  Respiratory ROS: no cough, shortness of breath, or wheezing  Cardiovascular ROS: no chest pain or dyspnea on exertion  Genito-Urinary ROS: no dysuria, trouble voiding, or hematuria  Musculoskeletal ROS: negative for - gait disturbance, joint pain or muscle pain  Neurological ROS: no TIA or stroke symptoms  GI ROS: see HPI  Skin ROS: no new rashes or lesions   Lymphatic ROS: no new adenopathy noted by pt.    GYN ROS: see HPI, no new GYN history or bleeding noted  Psy ROS: no new mental or behavioral disturbances         Patient Active Problem List   Diagnosis   • Angina pectoris ([address])   • Essential hypertension   • Migraines   • AMD (age-related macular degeneration), bilateral   • Allergic reaction   • Gastroesophageal reflux disease without esophagitis   • Allergic conjunctivitis of both eyes   • Angio-edema   • Paroxysmal atrial fibrillation (HCC)   • Lumbar radiculopathy   • CAMILLE (obstructive sleep apnea)   • Hypertensive heart disease with congestive heart failure (HCC)   • Unspecified diastolic (congestive) heart failure (HCC)   • 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HCC)   • COPD (chronic obstructive pulmonary disease) (HCC)   • Urinary retention   • Peripheral vascular disease ([address])   • Appendix disease   • Carotid artery stenosis   • Stenosis of left carotid artery   • Asymptomatic stenosis of left carotid artery   • Hepatic steatosis   • Partial facial nerve palsy   • Injury of left facial nerve   • Colitis   • Chronic migraine w/o aura w/o status migrainosus, not intractable   • IIH (idiopathic intracranial hypertension)   • Hematochezia   • Left carotid stenosis   • Colonic ischemia (HCC)   • Acute postoperative pain   • S/P small bowel resection   • Anxiety       Allergies:  Ace inhibitors, Benicar [olmesartan], Hydralazine, Other, Valsartan, Sulfa antibiotics, Ampicillin, Motrin [ibuprofen], and Wound dressing adhesive      Current Outpatient Medications:   •  acetaminophen (TYLENOL) 325 mg tablet, Take 2 tablets (650 mg total) by mouth every 6 (six) hours, Disp: , Rfl: 0  •  amiodarone 200 mg tablet, Take 1 tablet (200 mg total) by mouth daily, Disp: 30 tablet, Rfl: 0  •  Ascorbic Acid (vitamin C) 1000 MG tablet, Take 1 tablet (1,000 mg total) by mouth daily, Disp: 30 tablet, Rfl: 0  •  aspirin 81 mg chewable tablet, Chew 1 tablet (81 mg total) daily, Disp: , Rfl: 0  •  Cholecalciferol 25 MCG (1000 UT) tablet, Take 1,000 Units by mouth daily, Disp: , Rfl:   •  dexamethasone (DECADRON) 4 mg tablet, 1 tab PO with breakfast for 1 to 5 days for unrelenting migraine, Disp: 5 tablet, Rfl: 0  •  Docusate Sodium (COLACE PO), Take by mouth daily at bedtime, Disp: , Rfl:   •  Eliquis 5 MG, TAKE ONE TABLET BY MOUTH TWICE DAILY, Disp: 180 tablet, Rfl: 0  •  famotidine (PEPCID) 20 mg tablet, Take 20 mg by mouth daily at bedtime, Disp: , Rfl:   •  fexofenadine (ALLEGRA) 180 MG tablet, Take 180 mg by mouth daily, Disp: , Rfl:   •  fluticasone-vilanterol (BREO ELLIPTA) 200-25 MCG/INH inhaler, Inhale 1 puff daily Rinse mouth after use., Disp: 3 Inhaler, Rfl: 3  •  furosemide (LASIX) 40 mg tablet, Take 1 tablet (40 mg total) by mouth daily, Disp: 90 tablet, Rfl: 0  •  HYDROcodone-acetaminophen (Norco) 5-325 mg per tablet, Take 1 tablet by mouth every 6 (six) hours as needed for pain Max Daily Amount: 4 tablets, Disp: 10 tablet, Rfl: 0  •  hydrOXYzine HCL (ATARAX) 25 mg tablet, TAKE TWO TABLETS BY MOUTH DAILY AT BEDTIME, Disp: 90 tablet, Rfl: 0  •  LORazepam (Ativan) 1 mg tablet, Take 1 tablet (1 mg total) by mouth every 8 (eight) hours as needed for anxiety, Disp: 90 tablet, Rfl: 0  •  methocarbamol (ROBAXIN) 500 mg tablet, Take 1 tablet (500 mg total) by mouth 3 (three) times a day, Disp: 90 tablet, Rfl: 2  •  metoprolol succinate (TOPROL-XL) 100 mg 24 hr tablet, Take 1 tablet (100 mg total) by mouth 2 (two) times a day, Disp: 180 tablet, Rfl: 0  •  montelukast (SINGULAIR) 10 mg tablet, TAKE ONE TABLET BY MOUTH DAILY AT BEDTIME, Disp: 90 tablet, Rfl: 0  •  MULTIPLE VITAMIN PO, Take by mouth, Disp: , Rfl:   •  naloxone (NARCAN) 4 mg/0.1 mL nasal spray, Administer 1 spray into a nostril.  If no response after 2-3 minutes, give another dose in the other nostril using a new spray., Disp: 1 each, Rfl: 1  •  promethazine (PHENERGAN) 25 mg tablet, Take 1 tablet (25 mg total) by mouth every 6 (six) hours as needed for nausea or vomiting, Disp: 60 tablet, Rfl: 0  •  Repatha SureClick 015 MG/ML SOAJ, Inject 140mg under the skin every 2 weeks. , Disp: 2 mL, Rfl: 11  •  spironolactone (ALDACTONE) 25 mg tablet, TAKE ONE TABLET BY MOUTH TWICE DAILY, Disp: 180 tablet, Rfl: 0  •  sucralfate (CARAFATE) 1 g tablet, Take 1 tablet (1 g total) by mouth 2 (two) times a day before meals, Disp: 60 tablet, Rfl: 5  •  traMADol (ULTRAM) 50 mg tablet, Take 2 tablets (100 mg total) by mouth every 8 (eight) hours as needed for severe pain, Disp: 180 tablet, Rfl: 0  •  ascorbic acid (VITAMIN C) 1000 MG tablet, Take 1,000 mg by mouth daily, Disp: , Rfl:   •  diltiazem (CARDIZEM CD) 120 mg 24 hr capsule, Take 1 capsule (120 mg total) by mouth daily, Disp: 30 capsule, Rfl: 0    Current Facility-Administered Medications:   •  cyanocobalamin injection 1,000 mcg, 1,000 mcg, Intramuscular, Q30 Days, Anusha Cisneros, DO, 1,000 mcg at 07/07/23 1052    Past Medical History:   Diagnosis Date   • A-fib ([address] [facility]) 03/2020   • Allergic    • Anxiety    • Arthritis    • Asthma    • Back pain     and muscle pain   • Bruises easily    • Chronic pain disorder     Interstitial pain   • Colon polyp    • Dental bridge present    • Depression    • Eczema    • GERD (gastroesophageal reflux disease)    • Heart murmur    • History of blood clots     per pt "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"   • History of pneumonia    • Hives    • Hyperlipidemia    • Hypertension    • Infertility, female    • Interstitial cystitis    • Migraine     sees neurologist   • Motion sickness    • Palpitations    • PONV (postoperative nausea and vomiting)    • Premature atrial contractions 11/9/2022   • Psychiatric disorder    • TIA (transient ischemic attack) 09/2022    per pt --"had MRI and 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"   • Urinary incontinence     wears Pads   • Venous insufficiency 8/1/2017   • Wears glasses        Past Surgical History:   Procedure Laterality Date   • COLONOSCOPY     • DILATION AND CURETTAGE OF UTERUS     • EGD     • EXPLORATORY LAPAROTOMY      for infertility   • EXPLORATORY LAPAROTOMY W/ BOWEL RESECTION N/A 7/19/2023    Procedure: LAPAROTOMY EXPLORATORY W/ BOWEL RESECTION;  Surgeon: Avis Galvan MD;  Location: [facility];  Service: General   • HAND SURGERY      Hand excision of tendon cyst   • ID LAPAROSCOPIC APPENDECTOMY N/A 05/03/2022    Procedure: APPENDECTOMY LAPAROSCOPIC;  Surgeon:  Rozina Tse MD;  Location: [facility];  Service: General   • ID LAPS ABD PRTM&OMENTUM DX W/WO SPEC BR/WA SPX N/A 7/19/2023    Procedure: LAPAROSCOPY DIAGNOSTIC, LYSIS OF ADHESIONS, LAPAROSCOPY TAKE TAKEDOWN OF LEFT COLON, EXPLORATORY LAPAROSCOPY, LYSIS OF ADHESIONS, RESECTION OF TRANSVERSE COLON SPLENIC FLEXURE AND DESCENDING COLON , TAKE DOWN OF SPLENIC FLEXURE, SIGMOIDOSCOPY, MOBILIZATION OF RIGHT COLON, PRIMARY ANASTOMOSIS EEA 34, TAP BLOCK;  Surgeon: Avis Galvan MD;  Location: [facility];  Service: General   • ID TEAEC W/PATCH GRF CAROTID VERTB [facility] Left 1/31/2023    Procedure: EXPLORATION OF LEFT CAROTID ARTERY; ABORTED ENDARTERECTOMY ARTERY CAROTID;  Surgeon: Olesya Feliz DO;  Location: [facility];  Service: Vascular   • SUPERIOR MESTENTERIC ARTERY STENT     • WISDOM TOOTH EXTRACTION         Family History   Problem Relation Age of Onset   • Lung cancer Mother 61   • Brain cancer Mother 61   • Diabetes Father    • Depression Father    • Other Father         septic   • Allergies Sister    • Hashimoto's thyroiditis Sister    • Abdominal aortic aneurysm Sister    • Diabetes Sister    • No Known Problems Sister    • No Known Problems Maternal Grandmother    • No Known Problems Maternal Grandfather    • No Known Problems Paternal Grandmother    • No Known Problems Paternal Grandfather    • Hodgkin's lymphoma Brother    • No Known Problems Brother    • No Known Problems Maternal Aunt    • Diabetes Other         reports that she quit smoking about 4 years ago. Her smoking use included cigarettes. She has a 5.00 pack-year smoking history. She has never been exposed to tobacco smoke. She has never used smokeless tobacco. She reports that she does not currently use alcohol. She reports that she does not use drugs. Vitals:    08/21/23 0947   BP: 130/80   Pulse: 78   Temp: 97.6 °F (36.4 °C)       PHYSICAL EXAM  General: normal, cooperative, no distress  Abdominal: soft, nondistended or nontender  Incision: clean, dry, and intact and healing well    Some portions of this record may have been generated with voice recognition software. There may be translation, syntax,  or grammatical errors. Occasional wrong word or "sound-a-like" substitutions may have occurred due to the inherent limitations of the voice recognition software. Read the chart carefully and recognize, using context, where substitutions may have occurred. If you have any questions, please contact the dictating provider for clarification or correction, as needed. This encounter has been coded by a non-certified coder.        Jalen Patten MD    Date: 8/21/2023 Time: 10:15 AM

## 2023-08-22 ENCOUNTER — PATIENT OUTREACH (OUTPATIENT)
Dept: FAMILY MEDICINE CLINIC | Facility: CLINIC | Age: 63
End: 2023-08-22

## 2023-08-22 ENCOUNTER — HOME CARE VISIT (OUTPATIENT)
Dept: HOME HEALTH SERVICES | Facility: HOME HEALTHCARE | Age: 63
End: 2023-08-22
Payer: COMMERCIAL

## 2023-08-22 PROCEDURE — G0300 HHS/HOSPICE OF LPN EA 15 MIN: HCPCS

## 2023-08-23 ENCOUNTER — TELEPHONE (OUTPATIENT)
Dept: SURGERY | Facility: CLINIC | Age: 63
End: 2023-08-23

## 2023-08-23 ENCOUNTER — HOME CARE VISIT (OUTPATIENT)
Dept: HOME HEALTH SERVICES | Facility: HOME HEALTHCARE | Age: 63
End: 2023-08-23
Payer: COMMERCIAL

## 2023-08-23 VITALS
HEART RATE: 56 BPM | OXYGEN SATURATION: 95 % | RESPIRATION RATE: 20 BRPM | DIASTOLIC BLOOD PRESSURE: 66 MMHG | SYSTOLIC BLOOD PRESSURE: 124 MMHG

## 2023-08-23 NOTE — TELEPHONE ENCOUNTER
Last OV was 8/21/23. [facility] was out to see patient, there is very little drainage coming from wound. They would like to know if it should be covered with a dry dressing? Her procedure was LAPAROTOMY EXPLORATORY W/ BOWEL RESECTION. LAPAROSCOPY DIAGNOSTIC. LYSIS OF ADHESIONS. LAPARASCOPY TAKE TAKEDOWN OF LEFT COLON. EXPLORATORY LAPAROSCOPY LYSIS OF ADHESIONS. RESECTION TRANSVERSE COLON , SPLENIC FLEXURE, . AND DESCENDING COLON . TAKE DOWN OF SPLENIC FLEXURE. SIGMOIDOSCOPY. MOBILIZATION OF RIGHT COLON. PRIMARY ANASTOMOSIS EEA 29.  TAP BLOCK on 7/19/23    Selena Maher [facility] [phone]  [facility] office   [phone]

## 2023-08-23 NOTE — CASE COMMUNICATION
Received return phonecall from [place] with Dr. Cody Arguelles. Okay to apply dressing to area on navel if it is open and draining. Minimal drainage. Will send orders for dry dressing PRN for drainage and change daily if soiled. Patient was instructed on same yesterday and is independent.

## 2023-08-24 ENCOUNTER — HOME CARE VISIT (OUTPATIENT)
Dept: HOME HEALTH SERVICES | Facility: HOME HEALTHCARE | Age: 63
End: 2023-08-24
Payer: COMMERCIAL

## 2023-08-24 VITALS
OXYGEN SATURATION: 95 % | DIASTOLIC BLOOD PRESSURE: 80 MMHG | SYSTOLIC BLOOD PRESSURE: 130 MMHG | TEMPERATURE: 98.3 F | RESPIRATION RATE: 16 BRPM | HEART RATE: 72 BPM

## 2023-08-24 PROCEDURE — G0299 HHS/HOSPICE OF RN EA 15 MIN: HCPCS

## 2023-08-29 ENCOUNTER — PATIENT OUTREACH (OUTPATIENT)
Dept: FAMILY MEDICINE CLINIC | Facility: CLINIC | Age: 63
End: 2023-08-29

## 2023-08-29 ENCOUNTER — HOME CARE VISIT (OUTPATIENT)
Dept: HOME HEALTH SERVICES | Facility: HOME HEALTHCARE | Age: 63
End: 2023-08-29
Payer: COMMERCIAL

## 2023-08-29 VITALS
OXYGEN SATURATION: 96 % | HEART RATE: 75 BPM | TEMPERATURE: 98.1 F | RESPIRATION RATE: 16 BRPM | SYSTOLIC BLOOD PRESSURE: 130 MMHG | DIASTOLIC BLOOD PRESSURE: 70 MMHG

## 2023-08-29 PROCEDURE — G0299 HHS/HOSPICE OF RN EA 15 MIN: HCPCS

## 2023-08-29 NOTE — PROGRESS NOTES
Contacted patient for f/u. She is receiving home health services of . Her incision is open to air and healing well without s/s of infection. She continues to have incisional pain and is taking tramadol as needed. Nutritional intake adequate, she is eating 6 small meals per day. She is moving her bowels without issue. Weight has remained stable. We discussed weight parameters to monitor and report. No c/o chest pain, sob or LE edema. Respiratory status is stable, she is using Breo inhaler daily. She is taking all medications as prescribed. Follow up appointments scheduled. No transportation issues. She feels her anxiety is better controlled but admits to  good days and bad days. She denies needing assistance at home. She is taking daily walks for exercise. She is agreeable to continued outreach.

## 2023-08-31 DIAGNOSIS — R11.0 NAUSEA: ICD-10-CM

## 2023-08-31 RX ORDER — PROMETHAZINE HYDROCHLORIDE 25 MG/1
25 TABLET ORAL EVERY 6 HOURS PRN
Qty: 60 TABLET | Refills: 5 | Status: SHIPPED | OUTPATIENT
Start: 2023-08-31

## 2023-09-01 ENCOUNTER — TELEPHONE (OUTPATIENT)
Dept: NEUROLOGY | Facility: CLINIC | Age: 63
End: 2023-09-01

## 2023-09-01 ENCOUNTER — HOME CARE VISIT (OUTPATIENT)
Dept: HOME HEALTH SERVICES | Facility: HOME HEALTHCARE | Age: 63
End: 2023-09-01
Payer: COMMERCIAL

## 2023-09-01 VITALS
HEART RATE: 74 BPM | OXYGEN SATURATION: 96 % | DIASTOLIC BLOOD PRESSURE: 70 MMHG | SYSTOLIC BLOOD PRESSURE: 116 MMHG | RESPIRATION RATE: 16 BRPM | TEMPERATURE: 97.8 F

## 2023-09-01 PROCEDURE — G0299 HHS/HOSPICE OF RN EA 15 MIN: HCPCS

## 2023-09-01 NOTE — TELEPHONE ENCOUNTER
Called and spoke to patient to confirm their upcoming appointment with Dr. Ce Caruso. Informed patient about arriving in the [place] location 15 minutes prior to their appointment to get checked in and going over chart.

## 2023-09-04 DIAGNOSIS — F41.9 ANXIETY: ICD-10-CM

## 2023-09-05 ENCOUNTER — HOME CARE VISIT (OUTPATIENT)
Dept: HOME HEALTH SERVICES | Facility: HOME HEALTHCARE | Age: 63
End: 2023-09-05
Payer: COMMERCIAL

## 2023-09-05 RX ORDER — LORAZEPAM 1 MG/1
1 TABLET ORAL EVERY 8 HOURS PRN
Qty: 30 TABLET | Refills: 0 | Status: SHIPPED | OUTPATIENT
Start: 2023-09-05 | End: 2023-09-15

## 2023-09-06 ENCOUNTER — OFFICE VISIT (OUTPATIENT)
Dept: NEUROLOGY | Facility: CLINIC | Age: 63
End: 2023-09-06
Payer: COMMERCIAL

## 2023-09-06 VITALS
HEART RATE: 63 BPM | BODY MASS INDEX: 30.56 KG/M2 | SYSTOLIC BLOOD PRESSURE: 155 MMHG | HEIGHT: 64 IN | TEMPERATURE: 98.4 F | DIASTOLIC BLOOD PRESSURE: 73 MMHG | WEIGHT: 179 LBS

## 2023-09-06 DIAGNOSIS — G43.709 CHRONIC MIGRAINE WITHOUT AURA WITHOUT STATUS MIGRAINOSUS, NOT INTRACTABLE: Primary | ICD-10-CM

## 2023-09-06 PROCEDURE — 99214 OFFICE O/P EST MOD 30 MIN: CPT | Performed by: PSYCHIATRY & NEUROLOGY

## 2023-09-06 RX ORDER — LANOLIN ALCOHOL/MO/W.PET/CERES
400 CREAM (GRAM) TOPICAL
Qty: 90 TABLET | Refills: 3 | Status: SHIPPED | OUTPATIENT
Start: 2023-09-06

## 2023-09-06 NOTE — PATIENT INSTRUCTIONS
Please do follow-up with eye doctor for dilated eye exam and please let me know if they see any evidence of papilledema or swelling behind your eyes. Do not trust that they will send me the note. Do what ever you can to treat your sleep apnea as this is likely what is contributing to other chronic pain and carries with it significant morbidity and mortality if it remains untreated. Please do not drive if not seeing well that day or if not feeling cognitive delay well that day. Please do follow-up with the sleep medicine team and treat the sleep apnea anytime you are sleeping as this is a very important issue that is likely contributing to many of your other conditions that we are medicating. Continue to try and sleep on your side, consider zzoma pillow      I recommend you see one of the following sleep providers:  Lise Randall PA-C      Please discuss with your cardiologist tomorrow that we are starting acetazolamide/Diamox which does not technically interact with the variety of other heart/BP meds you are on, but there are multiple diuretics and I agree if cardiology and PCP on board if we could take 1 or more away or decrease them as this increases that would be helpful. I worry about lowering your blood pressure or electrolyte disturbances. Headache/migraine treatment:   Rescue medications (for immediate treatment of a headache): It is ok to take acetaminophen  if they help your headaches you should limit these to No more than 3 times a week to avoid medication overuse/rebound headaches.        Try Excedrin tension instead of Excedrin Migraine so that you are not adding an additional 3 aspirin to your daily aspirin and Eliquis    -     dexamethasone (DECADRON) 4 mg tablet; 1 tab PO with breakfast for 1 to 5 days for unrelenting migraine      Over the counter preventive supplements for headaches/migraines (if you try, try for 3 months straight)  (to take every day to help prevent headaches - not to take at the time of headache):  [x] Magnesium 400mg daily (If any diarrhea or upset stomach, decrease dose  as tolerated)    Prescription preventive medications for headaches/migraines   (to take every day to help prevent headaches - not to take at the time of headache):  [x]  We have options if you would like - like restarting diamox     *Typically these types of medications take time until you see the benefit, although some may see improvement in days, often it may take weeks, especially if the medication is being titrated up to a beneficial level. Please contact us if there are any concerns or questions regarding the medication. Lifestyle Recommendations:  [x] SLEEP - Maintain a regular sleep schedule: Adults need at least 7-8 hours of uninterrupted a night. Maintain good sleep hygiene:  Going to bed and waking up at consistent times, avoiding excessive daytime naps, avoiding caffeinated beverages in the evening, avoid excessive stimulation in the evening and generally using bed primarily for sleeping. One hour before bedtime would recommend turning lights down lower, decreasing your activity (may read quietly, listen to music at a low volume). When you get into bed, should eliminate all technology (no texting, emailing, playing with your phone, iPad or tablet in bed). [x] HYDRATION - Maintain good hydration. Drink  2L of fluid a day (4 typical small water bottles)  [x] DIET - Maintain good nutrition. In particular don't skip meals and try and eat healthy balanced meals regularly. [x] TRIGGERS - Look for other triggers and avoid them: Limit caffeine to 1-2 cups a day or less. Avoid dietary triggers that you have noticed bring on your headaches (this could include aged cheese, peanuts, MSG, aspartame and nitrates).   [x] EXERCISE - physical exercise as we all know is good for you in many ways, and not only is good for your heart, but also is beneficial for your mental health, cognitive health and  chronic pain/headaches. I would encourage at the least 5 days of physical exercise weekly for at least 30 minutes. Education and Follow-up  [x] Please call with any questions or concerns. Of course if any new concerning symptoms go to the emergency department.   [x] Follow up 2-3 months, sooner if needed

## 2023-09-06 NOTE — PROGRESS NOTES
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 09/03/2021  Moderate CAMILLE on CPAP, (home sleep study 12/2020 DENZEL 14) - stopped using CPAP 4 months ago   - averages: 4-5 hours   Problems falling asleep?:   Yes  Problems staying asleep?:  Yes    Water: 4 bottles per day  Caffeine: none except for exedrin and fioricet      Mood: no job since 2020, a lot of stress and 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, but went back and loved that job and the stent issue came up so she was terminated  Anxiety, depression is not bad with the meds and breathing     The following portions of the patient's history were reviewed and updated as appropriate: allergies, current medications, past family history, past medical history, past social history, past surgical history and problem list.    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 [employer]   Education: college - Xray   Lives alone    Past Medical History:     Past Medical History:   Diagnosis Date   • A-fib ([facility]) 03/2020   • Allergic    • Anxiety    • Arthritis    • Asthma    • Back pain and muscle pain   • Bruises easily    • Chronic pain disorder     Interstitial pain   • Colon polyp    • Dental bridge present    • Depression    • Eczema    • GERD (gastroesophageal reflux disease)    • Heart murmur    • History of blood clots     per pt "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"   • History of pneumonia    • Hives    • Hyperlipidemia    • Hypertension    • Infertility, female    • Interstitial cystitis    • Migraine     sees neurologist   • Motion sickness    • Palpitations    • PONV (postoperative nausea and vomiting)    • Premature atrial contractions 11/9/2022   • Psychiatric disorder    • TIA (transient ischemic attack) 09/2022    per pt --"had MRI and 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"   • Urinary incontinence     wears Pads   • Venous insufficiency 8/1/2017   • Wears glasses        Patient Active Problem List   Diagnosis   • Angina pectoris ([address])   • Essential hypertension   • Migraines   • AMD (age-related macular degeneration), bilateral   • Allergic reaction   • Gastroesophageal reflux disease without esophagitis   • Allergic conjunctivitis of both eyes   • Angio-edema   • Paroxysmal atrial fibrillation (HCC)   • Lumbar radiculopathy   • CAMILLE (obstructive sleep apnea)   • Hypertensive heart disease with congestive heart failure (HCC)   • Unspecified diastolic (congestive) heart failure ([address])   • 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HCC)   • COPD (chronic obstructive pulmonary disease) ([address])   • Urinary retention   • Peripheral vascular disease ([address])   • Appendix disease   • Carotid artery stenosis   • Stenosis of left carotid artery   • Asymptomatic stenosis of left carotid artery   • Hepatic steatosis   • Partial facial nerve palsy   • Injury of left facial nerve   • Colitis   • Chronic migraine w/o aura w/o status migrainosus, not intractable   • IIH (idiopathic intracranial hypertension)   • Hematochezia   • Left carotid stenosis   • Colonic ischemia (HCC)   • Acute postoperative pain   • S/P small bowel resection   • Anxiety       Medications:      Current Outpatient Medications   Medication Sig Dispense Refill   • acetaminophen (TYLENOL) 325 mg tablet Take 2 tablets (650 mg total) by mouth every 6 (six) hours  0   • amiodarone 200 mg tablet Take 1 tablet (200 mg total) by mouth daily 30 tablet 0   • Ascorbic Acid (vitamin C) 1000 MG tablet Take 1 tablet (1,000 mg total) by mouth daily 30 tablet 0   • aspirin 81 mg chewable tablet Chew 1 tablet (81 mg total) daily  0   • Cholecalciferol 25 MCG (1000 UT) tablet Take 1,000 Units by mouth daily     • dexamethasone (DECADRON) 4 mg tablet 1 tab PO with breakfast for 1 to 5 days for unrelenting migraine 5 tablet 0   • diltiazem (CARDIZEM CD) 120 mg 24 hr capsule Take 1 capsule (120 mg total) by mouth daily 30 capsule 0   • Docusate Sodium (COLACE PO) Take by mouth daily at bedtime     • Eliquis 5 MG TAKE ONE TABLET BY MOUTH TWICE DAILY 180 tablet 0   • famotidine (PEPCID) 20 mg tablet Take 20 mg by mouth daily at bedtime     • fexofenadine (ALLEGRA) 180 MG tablet Take 180 mg by mouth daily     • fluticasone-vilanterol (BREO ELLIPTA) 200-25 MCG/INH inhaler Inhale 1 puff daily Rinse mouth after use.  3 Inhaler 3   • furosemide (LASIX) 40 mg tablet Take 1 tablet (40 mg total) by mouth daily 90 tablet 0   • hydrOXYzine HCL (ATARAX) 25 mg tablet TAKE TWO TABLETS BY MOUTH DAILY AT BEDTIME 90 tablet 0   • LORazepam (Ativan) 1 mg tablet Take 1 tablet (1 mg total) by mouth every 8 (eight) hours as needed for anxiety 30 tablet 0   • magnesium Oxide (MAG-OX) 400 mg TABS Take 1 tablet (400 mg total) by mouth daily at bedtime 90 tablet 3   • methocarbamol (ROBAXIN) 500 mg tablet Take 1 tablet (500 mg total) by mouth 3 (three) times a day 90 tablet 2   • metoprolol succinate (TOPROL-XL) 100 mg 24 hr tablet Take 1 tablet (100 mg total) by mouth 2 (two) times a day 180 tablet 0   • montelukast (SINGULAIR) 10 mg tablet TAKE ONE TABLET BY MOUTH DAILY AT BEDTIME 90 tablet 0   • MULTIPLE VITAMIN PO Take by mouth     • naloxone (NARCAN) 4 mg/0.1 mL nasal spray Administer 1 spray into a nostril. If no response after 2-3 minutes, give another dose in the other nostril using a new spray. 1 each 1   • promethazine (PHENERGAN) 25 mg tablet Take 1 tablet (25 mg total) by mouth every 6 (six) hours as needed for nausea or vomiting 60 tablet 5   • Repatha SureClick 911 MG/ML SOAJ Inject 140mg under the skin every 2 weeks. 2 mL 11   • spironolactone (ALDACTONE) 25 mg tablet TAKE ONE TABLET BY MOUTH TWICE DAILY 180 tablet 0   • sucralfate (CARAFATE) 1 g tablet Take 1 tablet (1 g total) by mouth 2 (two) times a day before meals 60 tablet 5   • traMADol (ULTRAM) 50 mg tablet Take 2 tablets (100 mg total) by mouth every 8 (eight) hours as needed for severe pain 180 tablet 0   • HYDROcodone-acetaminophen (Norco) 5-325 mg per tablet Take 1 tablet by mouth every 6 (six) hours as needed for pain Max Daily Amount: 4 tablets (Patient not taking: Reported on 9/6/2023) 10 tablet 0     Current Facility-Administered Medications   Medication Dose Route Frequency Provider Last Rate Last Admin   • cyanocobalamin injection 1,000 mcg  1,000 mcg Intramuscular Q30 Days Malika Bowens DO   1,000 mcg at 07/07/23 1052        Allergies: Allergies   Allergen Reactions   • Ace Inhibitors Angioedema and Anaphylaxis     Anaphylaxis   • Benicar [Olmesartan] Angioedema     See Allergy note from 9/11/2008. Swollen ankles/legs   • Hydralazine Other (See Comments)     Lip swelling  Flank pain   • Other Anaphylaxis and Other (See Comments)     Other reaction(s): angioadema  Other reaction(s):  Other (See Comments)  Preservatives- itching throat closes, hi  Other reaction(s): angioadema  E Z Cat - hives throat closes itching,  Preservatives- itching throat closes, hi  Artificial sweeteners   • Valsartan Angioedema     Lips, face swollen   • Sulfa Antibiotics Hives     stuffiness,itching,hives,throat closing--per pt "sometimes able to take depending on the brand and the filler or possibly preservatives in it"   • Ampicillin Hives     Depends on brand some preservatives can react. Has recently tolerated oral amoxicillin.    • Motrin [Ibuprofen] Other (See Comments)     Per pt " told not to take due to A Fib"   • Wound Dressing Adhesive Other (See Comments)     Per pt Adhesive on EKG leads caused redness       Family History:     Family History   Problem Relation Age of Onset   • Lung cancer Mother 61   • Brain cancer Mother 61   • Diabetes Father    • Depression Father    • Other Father         septic   • Allergies Sister    • Hashimoto's thyroiditis Sister    • Abdominal aortic aneurysm Sister    • Diabetes Sister    • No Known Problems Sister    • No Known Problems Maternal Grandmother    • No Known Problems Maternal Grandfather    • No Known Problems Paternal Grandmother    • No Known Problems Paternal Grandfather    • Hodgkin's lymphoma Brother    • No Known Problems Brother    • No Known Problems Maternal Aunt    • Diabetes Other        Social History:     Social History     Socioeconomic History   • Marital status:      Spouse name: Not on file   • Number of children: 0   • Years of education: Not on file   • Highest education level: Not on file   Occupational History   • Occupation: unemployed   Tobacco Use   • Smoking status: Former     Packs/day: 0.50     Years: 10.00     Total pack years: 5.00     Types: Cigarettes     Quit date: 2019     Years since quittin.6     Passive exposure: Never   • Smokeless tobacco: Never   Vaping Use   • Vaping Use: Never used   Substance and Sexual Activity   • Alcohol use: Not Currently   • Drug use: No   • Sexual activity: Not on file     Comment: defer   Other Topics Concern   • Not on file   Social History Narrative    Who lives in your home: Alone     What type of home do you live in: Apartment     Age of your home: 1952 built     How long have you been living there: 5 yrs     Type of heat: forced hot air     Type of fuel: electric     What type of jamin is in your bedroom: carpet jamin     Do you have the following in or near your home:    Air products: Humidifier in the bedroom/kitchen     Pests: none     Pets: none     Are pets allowed in bedroom: N/A     Open fields, wooded areas nearby: No     Basement: [id number] mold     Exposure to second hand smoke: No    Central air     Habits:    Caffeine: Hot tea 1 cup daily- ice tea 1 cup in the afternoon    Chocolate: Occasionally      Social Determinants of Health     Financial Resource Strain: Low Risk  (5/24/2021)    Overall Financial Resource Strain (CARDIA)    • Difficulty of Paying Living Expenses: Not hard at all   Food Insecurity: No Food Insecurity (2/21/2023)    Hunger Vital Sign    • Worried About Running Out of Food in the Last Year: Never true    • Ran Out of Food in the Last Year: Never true   Transportation Needs: No Transportation Needs (2/21/2023)    PRAPARE - Transportation    • Lack of Transportation (Medical): No    • Lack of Transportation (Non-Medical): No   Physical Activity: Inactive (5/24/2021)    Exercise Vital Sign    • Days of Exercise per Week: 0 days    • Minutes of Exercise per Session: 0 min   Stress: Not on file   Social Connections:  Moderately Isolated (5/24/2021)    Social Connection and Isolation Panel [NHANES]    • Frequency of Communication with Friends and Family: More than three times a week    • Frequency of Social Gatherings with Friends and Family: More than three times a week    • Attends Sabianism Services: 1 to 4 times per year    • Active Member of Clubs or Organizations: No    • Attends Club or Organization Meetings: Never    • Marital Status: Never    Intimate Partner Violence: Not At Risk (5/24/2021)    Humiliation, Afraid, Rape, and Kick questionnaire    • Fear of Current or Ex-Partner: No    • Emotionally Abused: No    • Physically Abused: No    • Sexually Abused: No   Housing Stability: Low Risk  (7/20/2023)    Housing Stability Vital Sign    • Unable to Pay for Housing in the Last Year: No    • Number of Places Lived in the Last Year: 1    • Unstable Housing in the Last Year: No         Objective:       Physical Exam:                                                                 Vitals:            Constitutional:    /73 (BP Location: Left arm, Patient Position: Sitting, Cuff Size: Standard)   Pulse 63   Temp 98.4 °F (36.9 °C) (Tympanic)   Ht 5' 4" (1.626 m)   Wt 81.2 kg (179 lb)   LMP  (LMP Unknown)   BMI 30.73 kg/m²   BP Readings from Last 3 Encounters:   09/06/23 155/73   09/01/23 116/70   08/29/23 130/70     Pulse Readings from Last 3 Encounters:   09/06/23 63   09/01/23 74   08/29/23 75         Well developed, well nourished, well groomed. Psychiatric:  Normal behavior and appropriate affect        Neurological Examination:     Mental status/cognitive function:   Recent and remote memory appear intact. Attention span and concentration as well as fund of knowledge are appropriate for age. Normal language and spontaneous speech. Cranial Nerves:   VII-facial expression symmetric  Motor Exam: symmetric bulk throughout. no atrophy, fasciculations or abnormal movements noted. Coordination:  no apparent dysmetria, ataxia or tremor noted  Gait: steady casual gait          Pertinent lab results:   See EMR for recent labs  - 09/21/2022 CMP and CBC unremarkable  09/02/2022 A1c 5.6  06/06/2022 vitamin-D for 3.3  Vitamin B12 1,229  TSH normal     Imaging: I have personally reviewed imaging and radiology read   -MRI brain without contrast 2/21/2023: 1.  No acute infarction, intracranial hemorrhage or mass effect. 2.  Mild, chronic microangiopathy.   *On my retrospective review from 6/28/2023 we discussed I do see multiple signs of increased intracranial pressure including empty sella, prominent optic nerve sheaths with tortuous optic nerves, flattened posterior sclera  - MRI brain without contrast 09/01/2022: No evidence of acute infarct, intracranial hemorrhage or mass.  - CTA head and neck with without contrast 09/01/2022: 1.  No intracranial large vessel occlusion or critical stenosis. No aneurysm. 2.  Progression of atherosclerotic disease in the left cervical carotid artery with a new linear intraluminal defect at the origin/proximal ICA suggesting an ulcerated plaque resulting in about 80% stenosis of proximal ICA. 3.  Stable atherosclerotic change of right cervical carotid artery without hemodynamically significant stenosis. - carotid ultrasound 10/07/2022: RIGHT: There is <50% stenosis noted in the internal carotid artery. Plaque is Heterogenous. Vertebral artery flow is antegrade. There is no significant subclavian artery disease. LEFT: There is >70% stenosis noted in the internal carotid artery. Plaque is heterogenous. Vertebral artery flow is antegrade. There is no significant subclavian artery disease.  - MRI brain with without contrast 10/20/2018 for headache with facial paresthesias: Scattered anterior bifrontal white matter T2 and FLAIR hyperintense foci which are nonspecific but suspicious for mild chronic microangiopathic changes such as may accompany migraine headaches. Findings are stable. No pathologic enhancement. No acute ischemic disease identified by diffusion imaging  Review of Systems:   ROS obtained by medical assistant and personally reviewed, but if any symptoms listed below say negative, does not mean patient has not had this symptom since last visit, please see HPI for details of symptoms discussed this visit. I recommended PCP follow up for non neurologic problems. Review of Systems   Constitutional: Negative for appetite change and fever. HENT: Negative. Negative for hearing loss, tinnitus, trouble swallowing and voice change. Eyes: Negative. Negative for photophobia and pain.    Respiratory: Negative. Negative for shortness of breath. Cardiovascular: Negative. Negative for palpitations. Gastrointestinal: Negative. Negative for nausea and vomiting. Endocrine: Negative. Negative for cold intolerance. Genitourinary: Negative. Negative for dysuria, frequency and urgency. Musculoskeletal: Negative. Negative for myalgias and neck pain. Skin: Negative. Negative for rash. Neurological: Positive for headaches. Negative for dizziness, tremors, seizures, syncope, facial asymmetry, speech difficulty, weakness, light-headedness and numbness. Hematological: Negative. Does not bruise/bleed easily. Psychiatric/Behavioral: Positive for sleep disturbance. Negative for confusion and hallucinations. All other systems reviewed and are negative. I have spent 25 minutes with Patient  today in which greater than 50% of this time was spent in counseling/coordination of care regarding Diagnostic results, Prognosis, Risks and benefits of tx options, Instructions for management, Patient education, Importance of tx compliance, Risk factor reductions, Impressions, Counseling / Coordination of care, Documenting in the medical record, Reviewing / ordering tests, medicine, procedures   and Obtaining or reviewing history  . I also spent 7 minutes non face to face for this patient the same day.        Author:  Gabe Monsivais MD 9/6/2023 7:14 PM

## 2023-09-07 ENCOUNTER — OFFICE VISIT (OUTPATIENT)
Dept: GASTROENTEROLOGY | Facility: CLINIC | Age: 63
End: 2023-09-07
Payer: COMMERCIAL

## 2023-09-07 ENCOUNTER — HOME CARE VISIT (OUTPATIENT)
Dept: HOME HEALTH SERVICES | Facility: HOME HEALTHCARE | Age: 63
End: 2023-09-07
Payer: COMMERCIAL

## 2023-09-07 VITALS
SYSTOLIC BLOOD PRESSURE: 110 MMHG | TEMPERATURE: 98.3 F | RESPIRATION RATE: 16 BRPM | OXYGEN SATURATION: 96 % | HEART RATE: 76 BPM | DIASTOLIC BLOOD PRESSURE: 80 MMHG

## 2023-09-07 VITALS
HEIGHT: 64 IN | SYSTOLIC BLOOD PRESSURE: 104 MMHG | BODY MASS INDEX: 30.32 KG/M2 | TEMPERATURE: 97.9 F | HEART RATE: 62 BPM | WEIGHT: 177.6 LBS | DIASTOLIC BLOOD PRESSURE: 55 MMHG | OXYGEN SATURATION: 97 %

## 2023-09-07 DIAGNOSIS — R10.9 ABDOMINAL PAIN, UNSPECIFIED ABDOMINAL LOCATION: Primary | ICD-10-CM

## 2023-09-07 DIAGNOSIS — K21.9 GASTROESOPHAGEAL REFLUX DISEASE, UNSPECIFIED WHETHER ESOPHAGITIS PRESENT: ICD-10-CM

## 2023-09-07 PROCEDURE — G0299 HHS/HOSPICE OF RN EA 15 MIN: HCPCS

## 2023-09-07 PROCEDURE — 99214 OFFICE O/P EST MOD 30 MIN: CPT | Performed by: PHYSICIAN ASSISTANT

## 2023-09-07 RX ORDER — OMEPRAZOLE 40 MG/1
40 CAPSULE, DELAYED RELEASE ORAL DAILY
Qty: 90 CAPSULE | Refills: 3 | Status: SHIPPED | OUTPATIENT
Start: 2023-09-07

## 2023-09-07 NOTE — PROGRESS NOTES
[facility] - Outpatient Follow-up Note  Thom Guerrero 70 y.o. female MRN: [id number]  Encounter: [id number]      Assessment and Plan    1. Ischemic colitis s/p resection   2. Mesenteric artery thrombosis   S/p SMA stent at [facility]. More recently had a hospital admission after experiencing hematochezia and was found to have extensive ischemic colitis prompting extended left hemicolectomy and primary anastomosis 7/19. The patient had been doing well post op but states two days ago she developed sharp and burning pain after eating, drinking, and even taking medication. She is having bowel movements and denies any rectal bleeding.   -Obtain STAT CT scan for further evaluation of her pain  -Consider follow up EGD and colonoscopy 3-6 months after surgery     3. History of PUD  4. GERD  The patient has acid reflux which is generally well controlled on omeprazole 40 mg every morning and OTC pepcid however she does have frequent nausea. Prior EGD 5/28/19 revealed multiple superficial ulcers in the antrum with negative H pylori testing. Etiology thought to be NSAIDs. Repeat EGD 4/7/22 normal including esophogeal biopsies  -Avoid NSAIDS   -Continue daily omeprazole 40mg, pepcid 20mg, and as needed carafate   -Continue anti nausea meds, currently on as needed phenergan      4. Fatty liver  Seen on CT scan 11/29/21. U/S elastography with S3 steatosis but no fibrosis. Last CMP 7/18/23 normal aside for a mildly elevated alk phos of 118  -Routine monitoring of his CMP  -Recommend healthy diet and routine exercise    5.  IBS-C  The patient has a history of IBS-C currently well controlled on miralax and colace  -Continue miralax  -Continue Colace     Follow up 6 weeks     ______________________________________________________________________    History of Present Illness  Thom Guerrero is a 70 y.o. female HTN, HLD, afib on Eliquis, CAD, COPD, SMA thrombosis s/p stent here for follow up evaluation of a recent hospitalization. The patient presented with hematochezia. CTA revealed colitis somewhat matching the RAMBO distribution. This prompted flex sig which revealed necrotic mucosa in the descending colon and edematous and erythematous mucosa in the sigmoid colon prompting extensive colectomy and primary anastomosis 7/19. The patient had been doing well post op but states two days ago she developed sharp and burning pain after eating, drinking, and even taking medication. She is having bowel movements and denies any rectal bleeding.        Review of Systems      Past Medical History  Past Medical History:   Diagnosis Date   • A-fib ([address]) 03/2020   • Allergic    • Anxiety    • Arthritis    • Asthma    • Back pain     and muscle pain   • Bruises easily    • Chronic pain disorder     Interstitial pain   • Colon polyp    • Dental bridge present    • Depression    • Eczema    • GERD (gastroesophageal reflux disease)    • Heart murmur    • History of blood clots     per pt "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"   • History of pneumonia    • Hives    • Hyperlipidemia    • Hypertension    • Infertility, female    • Interstitial cystitis    • Migraine     sees neurologist   • Motion sickness    • Palpitations    • PONV (postoperative nausea and vomiting)    • Premature atrial contractions 11/9/2022   • Psychiatric disorder    • TIA (transient ischemic attack) 09/2022    per pt --"had MRI and 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"   • Urinary incontinence     wears Pads   • Venous insufficiency 8/1/2017   • Wears glasses        Past Social history  Past Surgical History:   Procedure Laterality Date   • COLONOSCOPY     • DILATION AND CURETTAGE OF UTERUS     • EGD     • EXPLORATORY LAPAROTOMY      for infertility   • EXPLORATORY LAPAROTOMY W/ BOWEL RESECTION N/A 7/19/2023    Procedure: LAPAROTOMY EXPLORATORY W/ BOWEL RESECTION;  Surgeon: Norval Hodgkin, MD;  Location: [facility];  Service: General   • HAND SURGERY Hand excision of tendon cyst   • OH LAPAROSCOPIC APPENDECTOMY N/A 2022    Procedure: APPENDECTOMY LAPAROSCOPIC;  Surgeon:  Alicia Nur MD;  Location: [facility];  Service: General   • OH LAPS ABD PRTM&OMENTUM DX W/WO SPEC BR/WA SPX N/A 2023    Procedure: LAPAROSCOPY DIAGNOSTIC, LYSIS OF ADHESIONS, LAPAROSCOPY TAKE TAKEDOWN OF LEFT COLON, EXPLORATORY LAPAROSCOPY, LYSIS OF ADHESIONS, RESECTION OF TRANSVERSE COLON SPLENIC FLEXURE AND DESCENDING COLON , TAKE DOWN OF SPLENIC FLEXURE, SIGMOIDOSCOPY, MOBILIZATION OF RIGHT COLON, PRIMARY ANASTOMOSIS EEA 29, TAP BLOCK;  Surgeon: Santosh Ragsdale MD;  Location: [facility];  Service: General   • OH TEAEC W/PATCH GRF CAROTID VERTB [address] Left 2023    Procedure: EXPLORATION OF LEFT CAROTID ARTERY; ABORTED ENDARTERECTOMY ARTERY CAROTID;  Surgeon: Waylan Holstein, DO;  Location: [facility];  Service: Vascular   • SUPERIOR MESTENTERIC ARTERY STENT     • WISDOM TOOTH EXTRACTION       Social History     Socioeconomic History   • Marital status:      Spouse name: Not on file   • Number of children: 0   • Years of education: Not on file   • Highest education level: Not on file   Occupational History   • Occupation: unemployed   Tobacco Use   • Smoking status: Former     Packs/day: 0.50     Years: 10.00     Total pack years: 5.00     Types: Cigarettes     Quit date:      Years since quittin.6     Passive exposure: Never   • Smokeless tobacco: Never   Vaping Use   • Vaping Use: Never used   Substance and Sexual Activity   • Alcohol use: Not Currently   • Drug use: No   • Sexual activity: Not on file     Comment: defer   Other Topics Concern   • Not on file   Social History Narrative    Who lives in your home: Alone     What type of home do you live in: Apartment     Age of your home: 1952 built     How long have you been living there: 5 yrs     Type of heat: forced hot air     Type of fuel: electric     What type of jamin is in your bedroom: carpet jamin     Do you have the following in or near your home:    Air products: Humidifier in the bedroom/kitchen     Pests: none     Pets: none     Are pets allowed in bedroom: N/A     Open fields, wooded areas nearby: No     Basement: [id number] mold     Exposure to second hand smoke: No    Central air     Habits:    Caffeine: Hot tea 1 cup daily- ice tea 1 cup in the afternoon    Chocolate: Occasionally      Social Determinants of Health     Financial Resource Strain: Low Risk  (5/24/2021)    Overall Financial Resource Strain (CARDIA)    • Difficulty of Paying Living Expenses: Not hard at all   Food Insecurity: No Food Insecurity (2/21/2023)    Hunger Vital Sign    • Worried About Running Out of Food in the Last Year: Never true    • Ran Out of Food in the Last Year: Never true   Transportation Needs: No Transportation Needs (2/21/2023)    PRAPARE - Transportation    • Lack of Transportation (Medical): No    • Lack of Transportation (Non-Medical): No   Physical Activity: Inactive (5/24/2021)    Exercise Vital Sign    • Days of Exercise per Week: 0 days    • Minutes of Exercise per Session: 0 min   Stress: Not on file   Social Connections:  Moderately Isolated (5/24/2021)    Social Connection and Isolation Panel [NHANES]    • Frequency of Communication with Friends and Family: More than three times a week    • Frequency of Social Gatherings with Friends and Family: More than three times a week    • Attends Yazdanism Services: 1 to 4 times per year    • Active Member of Clubs or Organizations: No    • Attends Club or Organization Meetings: Never    • Marital Status: Never    Intimate Partner Violence: Not At Risk (5/24/2021)    Humiliation, Afraid, Rape, and Kick questionnaire    • Fear of Current or Ex-Partner: No    • Emotionally Abused: No    • Physically Abused: No    • Sexually Abused: No   Housing Stability: Low Risk  (7/20/2023)    Housing Stability Vital Sign    • Unable to Pay for Housing in the Last Year: No    • Number of Places Lived in the Last Year: 1    • Unstable Housing in the Last Year: No     Social History     Substance and Sexual Activity   Alcohol Use Not Currently     Social History     Substance and Sexual Activity   Drug Use No     Social History     Tobacco Use   Smoking Status Former   • Packs/day: 0.50   • Years: 10.00   • Total pack years: 5.00   • Types: Cigarettes   • Quit date:    • Years since quittin.6   • Passive exposure: Never   Smokeless Tobacco Never       Past Family History  Family History   Problem Relation Age of Onset   • Lung cancer Mother 61   • Brain cancer Mother 61   • Diabetes Father    • Depression Father    • Other Father         septic   • Allergies Sister    • Hashimoto's thyroiditis Sister    • Abdominal aortic aneurysm Sister    • Diabetes Sister    • No Known Problems Sister    • No Known Problems Maternal Grandmother    • No Known Problems Maternal Grandfather    • No Known Problems Paternal Grandmother    • No Known Problems Paternal Grandfather    • Hodgkin's lymphoma Brother    • No Known Problems Brother    • No Known Problems Maternal Aunt    • Diabetes Other        Current Medications  Current Outpatient Medications   Medication Sig Dispense Refill   • acetaminophen (TYLENOL) 325 mg tablet Take 2 tablets (650 mg total) by mouth every 6 (six) hours  0   • amiodarone 200 mg tablet Take 1 tablet (200 mg total) by mouth daily 30 tablet 0   • aspirin 81 mg chewable tablet Chew 1 tablet (81 mg total) daily  0   • Cholecalciferol 25 MCG (1000 UT) tablet Take 1,000 Units by mouth daily     • dexamethasone (DECADRON) 4 mg tablet 1 tab PO with breakfast for 1 to 5 days for unrelenting migraine 5 tablet 0   • diltiazem (CARDIZEM CD) 120 mg 24 hr capsule Take 1 capsule (120 mg total) by mouth daily 30 capsule 0   • Docusate Sodium (COLACE PO) Take by mouth daily at bedtime     • Eliquis 5 MG TAKE ONE TABLET BY MOUTH TWICE DAILY 180 tablet 0   • famotidine (PEPCID) 20 mg tablet Take 20 mg by mouth daily at bedtime     • fexofenadine (ALLEGRA) 180 MG tablet Take 180 mg by mouth daily     • fluticasone-vilanterol (BREO ELLIPTA) 200-25 MCG/INH inhaler Inhale 1 puff daily Rinse mouth after use. 3 Inhaler 3   • furosemide (LASIX) 40 mg tablet Take 1 tablet (40 mg total) by mouth daily 90 tablet 0   • HYDROcodone-acetaminophen (Norco) 5-325 mg per tablet Take 1 tablet by mouth every 6 (six) hours as needed for pain Max Daily Amount: 4 tablets 10 tablet 0   • hydrOXYzine HCL (ATARAX) 25 mg tablet TAKE TWO TABLETS BY MOUTH DAILY AT BEDTIME 90 tablet 0   • LORazepam (Ativan) 1 mg tablet Take 1 tablet (1 mg total) by mouth every 8 (eight) hours as needed for anxiety 30 tablet 0   • magnesium Oxide (MAG-OX) 400 mg TABS Take 1 tablet (400 mg total) by mouth daily at bedtime 90 tablet 3   • methocarbamol (ROBAXIN) 500 mg tablet Take 1 tablet (500 mg total) by mouth 3 (three) times a day 90 tablet 2   • metoprolol succinate (TOPROL-XL) 100 mg 24 hr tablet Take 1 tablet (100 mg total) by mouth 2 (two) times a day 180 tablet 0   • montelukast (SINGULAIR) 10 mg tablet TAKE ONE TABLET BY MOUTH DAILY AT BEDTIME 90 tablet 0   • MULTIPLE VITAMIN PO Take by mouth     • naloxone (NARCAN) 4 mg/0.1 mL nasal spray Administer 1 spray into a nostril. If no response after 2-3 minutes, give another dose in the other nostril using a new spray. 1 each 1   • promethazine (PHENERGAN) 25 mg tablet Take 1 tablet (25 mg total) by mouth every 6 (six) hours as needed for nausea or vomiting 60 tablet 5   • Repatha SureClick 697 MG/ML SOAJ Inject 140mg under the skin every 2 weeks.  2 mL 11   • spironolactone (ALDACTONE) 25 mg tablet TAKE ONE TABLET BY MOUTH TWICE DAILY 180 tablet 0   • sucralfate (CARAFATE) 1 g tablet Take 1 tablet (1 g total) by mouth 2 (two) times a day before meals 60 tablet 5   • traMADol (ULTRAM) 50 mg tablet Take 2 tablets (100 mg total) by mouth every 8 (eight) hours as needed for severe pain 180 tablet 0   • Ascorbic Acid (vitamin C) 1000 MG tablet Take 1 tablet (1,000 mg total) by mouth daily 30 tablet 0     Current Facility-Administered Medications   Medication Dose Route Frequency Provider Last Rate Last Admin   • cyanocobalamin injection 1,000 mcg  1,000 mcg Intramuscular Q30 Days Korina Frausto DO   1,000 mcg at 07/07/23 1052       Allergies  Allergies   Allergen Reactions   • Ace Inhibitors Angioedema and Anaphylaxis     Anaphylaxis   • Benicar [Olmesartan] Angioedema     See Allergy note from 9/11/2008. Swollen ankles/legs   • Hydralazine Other (See Comments)     Lip swelling  Flank pain   • Other Anaphylaxis and Other (See Comments)     Other reaction(s): angioadema  Other reaction(s): Other (See Comments)  Preservatives- itching throat closes, hi  Other reaction(s): angioadema  E Z Cat - hives throat closes itching,  Preservatives- itching throat closes, hi  Artificial sweeteners   • Valsartan Angioedema     Lips, face swollen   • Sulfa Antibiotics Hives     stuffiness,itching,hives,throat closing--per pt "sometimes able to take depending on the brand and the filler or possibly preservatives in it"   • Ampicillin Hives     Depends on brand some preservatives can react. Has recently tolerated oral amoxicillin.    • Motrin [Ibuprofen] Other (See Comments)     Per pt " told not to take due to A Fib"   • Wound Dressing Adhesive Other (See Comments)     Per pt Adhesive on EKG leads caused redness         The following portions of the patient's history were reviewed and updated as appropriate: allergies, current medications, past medical history, past social history, past surgical history and problem list.      Vitals  Vitals:    09/07/23 1150   BP: 104/55   BP Location: Left arm   Patient Position: Sitting   Cuff Size: Adult   Pulse: 62   Temp: 97.9 °F (36.6 °C)   TempSrc: Tympanic   SpO2: 97%   Weight: 80.6 kg (177 lb 9.6 oz)   Height: 5' 4" (1.626 m)         Physical Exam  Constitutional   General appearance: Patient is seated and in no acute distress, well appearing and well nourished. Head and Face   Head and face: Normal.    Eyes   Conjunctiva and lids: No erythema, swelling or discharge. Anicteric. Ears, Nose, Mouth, and Throat   Hearing: Normal.    Neck: Supple, trachea midline. Pulmonary   Respiratory effort: No increased work of breathing or signs of respiratory distress. Lungs: Clear to ascultation, no wheezes, rhonchi, or rales  Cardiovascular   Heart: Regular rate and rhythm, no murmurs gallops or rubs   Examination of extremities for edema and/or varicosities: Normal.    Abdomen   Abdomen: Soft, tender just lateral to the left of her midline incision with a visible bulge but no appreciated hernia   Musculoskeletal   Gait and station: Normal   Skin   Skin and subcutaneous tissue: Warm, dry, and intact. No visible jaundice, lesions or rashes. Psychiatric   Judgment and insight: Normal  Recent and remote memory:  Normal  Mood and affect: Normal      Results  No visits with results within 1 Day(s) from this visit. Latest known visit with results is:   No results displayed because visit has over 200 results. Radiology Results  No results found. Orders  No orders of the defined types were placed in this encounter.

## 2023-09-08 ENCOUNTER — TELEPHONE (OUTPATIENT)
Dept: SURGERY | Facility: CLINIC | Age: 63
End: 2023-09-08

## 2023-09-08 ENCOUNTER — HOSPITAL ENCOUNTER (OUTPATIENT)
Dept: RADIOLOGY | Age: 63
Discharge: HOME | End: 2023-09-08
Payer: COMMERCIAL

## 2023-09-08 ENCOUNTER — HOSPITAL ENCOUNTER (EMERGENCY)
Facility: HOSPITAL | Age: 63
Discharge: HOME | End: 2023-09-08
Attending: EMERGENCY MEDICINE
Payer: COMMERCIAL

## 2023-09-08 VITALS
BODY MASS INDEX: 30.69 KG/M2 | WEIGHT: 178.79 LBS | RESPIRATION RATE: 16 BRPM | HEART RATE: 59 BPM | OXYGEN SATURATION: 96 % | TEMPERATURE: 97.6 F | DIASTOLIC BLOOD PRESSURE: 56 MMHG | SYSTOLIC BLOOD PRESSURE: 119 MMHG

## 2023-09-08 DIAGNOSIS — K55.069 OMENTAL INFARCTION (HCC): Primary | ICD-10-CM

## 2023-09-08 DIAGNOSIS — I48.0 PAROXYSMAL ATRIAL FIBRILLATION (HCC): ICD-10-CM

## 2023-09-08 DIAGNOSIS — R10.9 ABDOMINAL PAIN, UNSPECIFIED ABDOMINAL LOCATION: ICD-10-CM

## 2023-09-08 LAB
ANION GAP SERPL CALCULATED.3IONS-SCNC: 11 MMOL/L
BASOPHILS # BLD AUTO: 0.04 THOUSANDS/ÂΜL (ref 0–0.1)
BASOPHILS NFR BLD AUTO: 1 % (ref 0–1)
BUN SERPL-MCNC: 13 MG/DL (ref 5–25)
CALCIUM SERPL-MCNC: 9.5 MG/DL (ref 8.4–10.2)
CHLORIDE SERPL-SCNC: 100 MMOL/L (ref 96–108)
CO2 SERPL-SCNC: 27 MMOL/L (ref 21–32)
CREAT SERPL-MCNC: 0.78 MG/DL (ref 0.6–1.3)
EOSINOPHIL # BLD AUTO: 0.24 THOUSAND/ÂΜL (ref 0–0.61)
EOSINOPHIL NFR BLD AUTO: 4 % (ref 0–6)
ERYTHROCYTE [DISTWIDTH] IN BLOOD BY AUTOMATED COUNT: 12.6 % (ref 11.6–15.1)
GFR SERPL CREATININE-BSD FRML MDRD: 80 ML/MIN/1.73SQ M
GLUCOSE SERPL-MCNC: 128 MG/DL (ref 65–140)
HCT VFR BLD AUTO: 35.7 % (ref 34.8–46.1)
HGB BLD-MCNC: 11.4 G/DL (ref 11.5–15.4)
IMM GRANULOCYTES # BLD AUTO: 0.02 THOUSAND/UL (ref 0–0.2)
IMM GRANULOCYTES NFR BLD AUTO: 0 % (ref 0–2)
LACTATE SERPL-SCNC: 1.5 MMOL/L (ref 0.5–2)
LYMPHOCYTES # BLD AUTO: 1.95 THOUSANDS/ÂΜL (ref 0.6–4.47)
LYMPHOCYTES NFR BLD AUTO: 33 % (ref 14–44)
MCH RBC QN AUTO: 28.4 PG (ref 26.8–34.3)
MCHC RBC AUTO-ENTMCNC: 31.9 G/DL (ref 31.4–37.4)
MCV RBC AUTO: 89 FL (ref 82–98)
MONOCYTES # BLD AUTO: 0.46 THOUSAND/ÂΜL (ref 0.17–1.22)
MONOCYTES NFR BLD AUTO: 8 % (ref 4–12)
NEUTROPHILS # BLD AUTO: 3.25 THOUSANDS/ÂΜL (ref 1.85–7.62)
NEUTS SEG NFR BLD AUTO: 54 % (ref 43–75)
NRBC BLD AUTO-RTO: 0 /100 WBCS
PLATELET # BLD AUTO: 260 THOUSANDS/UL (ref 149–390)
PMV BLD AUTO: 9 FL (ref 8.9–12.7)
POTASSIUM SERPL-SCNC: 3.1 MMOL/L (ref 3.5–5.3)
RBC # BLD AUTO: 4.02 MILLION/UL (ref 3.81–5.12)
SODIUM SERPL-SCNC: 138 MMOL/L (ref 135–147)
WBC # BLD AUTO: 5.96 THOUSAND/UL (ref 4.31–10.16)

## 2023-09-08 PROCEDURE — 36415 COLL VENOUS BLD VENIPUNCTURE: CPT | Performed by: EMERGENCY MEDICINE

## 2023-09-08 PROCEDURE — G1004 CDSM NDSC: HCPCS

## 2023-09-08 PROCEDURE — NC001 PR NO CHARGE: Performed by: SURGERY

## 2023-09-08 PROCEDURE — 99285 EMERGENCY DEPT VISIT HI MDM: CPT | Performed by: EMERGENCY MEDICINE

## 2023-09-08 PROCEDURE — 74177 CT ABD & PELVIS W/CONTRAST: CPT

## 2023-09-08 PROCEDURE — 99284 EMERGENCY DEPT VISIT MOD MDM: CPT

## 2023-09-08 PROCEDURE — 85025 COMPLETE CBC W/AUTO DIFF WBC: CPT | Performed by: EMERGENCY MEDICINE

## 2023-09-08 PROCEDURE — 83605 ASSAY OF LACTIC ACID: CPT | Performed by: EMERGENCY MEDICINE

## 2023-09-08 PROCEDURE — 80048 BASIC METABOLIC PNL TOTAL CA: CPT | Performed by: EMERGENCY MEDICINE

## 2023-09-08 RX ORDER — AMIODARONE HYDROCHLORIDE 200 MG/1
200 TABLET ORAL DAILY
Qty: 30 TABLET | Refills: 5 | Status: SHIPPED | OUTPATIENT
Start: 2023-09-08

## 2023-09-08 RX ORDER — ACETAMINOPHEN 325 MG/1
650 TABLET ORAL ONCE
Status: COMPLETED | OUTPATIENT
Start: 2023-09-08 | End: 2023-09-08

## 2023-09-08 RX ADMIN — IOHEXOL 100 ML: 350 INJECTION, SOLUTION INTRAVENOUS at 08:05

## 2023-09-08 RX ADMIN — ACETAMINOPHEN 325MG 650 MG: 325 TABLET ORAL at 21:18

## 2023-09-08 NOTE — ED NOTES
Per Dr. Quinten Ayala, general surgery currently in OR and will be unable to respond to consult request until they are finished.      Unique Wilde RN  09/08/23 2082

## 2023-09-08 NOTE — ED PROVIDER NOTES
History  Chief Complaint   Patient presents with   • Abnormal Lab     Pt reporting pain in left side of her abdomen, increased after eating, nausea. Had appt with GI, had CT done, and resulted in abnormal result. A 69 yo female with pmhx of Afib (on eliquis), anxiety, asthma, HLD, HTN and mesenteric artery thrombosis (s/p SMA stent); presents with abdominal pain. Patient has a complicated recent medical history including a prolonged admission from 7/18-28 for ischemic colitis undergoing ex lap with bowel resection and lysis of adhesions. Patient states she was discharged home feeling well. Abdominal pain recurred a few days ago, worse after taking anything by mouth. She followed up with GI yesterday who sent her for a CT scan which resulted with an omental infarct (see full report below), and she was sent to the ED for further evaluation. Currently patient complains of persistent abdominal pain, worse with movement and eating. She does have nausea without vomiting. She reports regular bowel movement since surgery, although none today. She has not had fever, chills, chest pain, shortness of breath, dysuria, peripheral edema and rashes. Assessment and plan: Omental infarct, now with recurrent abdominal pain following recent surgery for ischemic colitis. Will check lab work including lactic for further evaluation. Will discuss with surgery. History provided by:  Patient and medical records    CTA (9/8)  1.) There is new 5.3 x 3.8 x 2.4 cm region of omental infarct in the right upper quadrant adjacent to the inferior border of the right hepatic lobe  2.) Otherwise no acute findings in the abdomen or pelvis. 3.) Expected postoperative changes with intact colonic anastomosis in the left upper quadrant. Patent SMA stent. Prior to Admission Medications   Prescriptions Last Dose Informant Patient Reported? Taking?    Ascorbic Acid (vitamin C) 1000 MG tablet  Self No No   Sig: Take 1 tablet (1,000 mg total) by mouth daily   Cholecalciferol 25 MCG (1000 UT) tablet  Self Yes No   Sig: Take 1,000 Units by mouth daily   Docusate Sodium (COLACE PO)  Self Yes No   Sig: Take by mouth daily at bedtime   Eliquis 5 MG  Self No No   Sig: TAKE ONE TABLET BY MOUTH TWICE DAILY   HYDROcodone-acetaminophen (Norco) 5-325 mg per tablet  Self No No   Sig: Take 1 tablet by mouth every 6 (six) hours as needed for pain Max Daily Amount: 4 tablets   LORazepam (Ativan) 1 mg tablet  Self No No   Sig: Take 1 tablet (1 mg total) by mouth every 8 (eight) hours as needed for anxiety   MULTIPLE VITAMIN PO  Self Yes No   Sig: Take by mouth   Repatha SureClick 287 MG/ML SOAJ  Self No No   Sig: Inject 140mg under the skin every 2 weeks. acetaminophen (TYLENOL) 325 mg tablet  Self No No   Sig: Take 2 tablets (650 mg total) by mouth every 6 (six) hours   amiodarone 200 mg tablet   No No   Sig: TAKE ONE TABLET BY MOUTH ONCE DAILY   aspirin 81 mg chewable tablet  Self No No   Sig: Chew 1 tablet (81 mg total) daily   dexamethasone (DECADRON) 4 mg tablet  Self No No   Si tab PO with breakfast for 1 to 5 days for unrelenting migraine   diltiazem (CARDIZEM CD) 120 mg 24 hr capsule  Self No No   Sig: Take 1 capsule (120 mg total) by mouth daily   famotidine (PEPCID) 20 mg tablet  Self Yes No   Sig: Take 20 mg by mouth daily at bedtime   fexofenadine (ALLEGRA) 180 MG tablet  Self Yes No   Sig: Take 180 mg by mouth daily   fluticasone-vilanterol (BREO ELLIPTA) 200-25 MCG/INH inhaler  Self No No   Sig: Inhale 1 puff daily Rinse mouth after use.    furosemide (LASIX) 40 mg tablet  Self No No   Sig: Take 1 tablet (40 mg total) by mouth daily   hydrOXYzine HCL (ATARAX) 25 mg tablet  Self No No   Sig: TAKE TWO TABLETS BY MOUTH DAILY AT BEDTIME   magnesium Oxide (MAG-OX) 400 mg TABS  Self No No   Sig: Take 1 tablet (400 mg total) by mouth daily at bedtime   methocarbamol (ROBAXIN) 500 mg tablet  Self No No   Sig: Take 1 tablet (500 mg total) by mouth 3 (three) times a day   metoprolol succinate (TOPROL-XL) 100 mg 24 hr tablet  Self No No   Sig: Take 1 tablet (100 mg total) by mouth 2 (two) times a day   montelukast (SINGULAIR) 10 mg tablet  Self No No   Sig: TAKE ONE TABLET BY MOUTH DAILY AT BEDTIME   naloxone (NARCAN) 4 mg/0.1 mL nasal spray  Self No No   Sig: Administer 1 spray into a nostril. If no response after 2-3 minutes, give another dose in the other nostril using a new spray.    omeprazole (PriLOSEC) 40 MG capsule   No No   Sig: Take 1 capsule (40 mg total) by mouth daily   promethazine (PHENERGAN) 25 mg tablet  Self No No   Sig: Take 1 tablet (25 mg total) by mouth every 6 (six) hours as needed for nausea or vomiting   spironolactone (ALDACTONE) 25 mg tablet  Self No No   Sig: TAKE ONE TABLET BY MOUTH TWICE DAILY   sucralfate (CARAFATE) 1 g tablet  Self No No   Sig: Take 1 tablet (1 g total) by mouth 2 (two) times a day before meals   traMADol (ULTRAM) 50 mg tablet  Self No No   Sig: Take 2 tablets (100 mg total) by mouth every 8 (eight) hours as needed for severe pain      Facility-Administered Medications Last Administration Doses Remaining   cyanocobalamin injection 1,000 mcg 7/7/2023 10:52 AM           Past Medical History:   Diagnosis Date   • A-fib ([facility]) 03/2020   • Allergic    • Anxiety    • Arthritis    • Asthma    • Back pain     and muscle pain   • Bruises easily    • Chronic pain disorder     Interstitial pain   • Colon polyp    • Dental bridge present    • Depression    • Eczema    • GERD (gastroesophageal reflux disease)    • Heart murmur    • History of blood clots     per pt "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"   • History of pneumonia    • Hives    • Hyperlipidemia    • Hypertension    • Infertility, female    • Interstitial cystitis    • Migraine     sees neurologist   • Motion sickness    • Palpitations    • PONV (postoperative nausea and vomiting)    • Premature atrial contractions 11/9/2022   • Psychiatric disorder    • TIA (transient ischemic attack) 09/2022    per pt --"had MRI and 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"   • Urinary incontinence     wears Pads   • Venous insufficiency 8/1/2017   • Wears glasses        Past Surgical History:   Procedure Laterality Date   • COLONOSCOPY     • DILATION AND CURETTAGE OF UTERUS     • EGD     • EXPLORATORY LAPAROTOMY      for infertility   • EXPLORATORY LAPAROTOMY W/ BOWEL RESECTION N/A 7/19/2023    Procedure: LAPAROTOMY EXPLORATORY W/ BOWEL RESECTION;  Surgeon: Donna Dodson MD;  Location: [facility];  Service: General   • HAND SURGERY      Hand excision of tendon cyst   • VT LAPAROSCOPIC APPENDECTOMY N/A 05/03/2022    Procedure: APPENDECTOMY LAPAROSCOPIC;  Surgeon:  Vinod Darby MD;  [facility];  Service: General   • VT LAPS ABD PRTM&OMENTUM DX W/WO SPEC BR/WA SPX N/A 7/19/2023    Procedure: LAPAROSCOPY DIAGNOSTIC, LYSIS OF ADHESIONS, LAPAROSCOPY TAKE TAKEDOWN OF LEFT COLON, EXPLORATORY LAPAROSCOPY, LYSIS OF ADHESIONS, RESECTION OF TRANSVERSE COLON SPLENIC FLEXURE AND DESCENDING COLON , TAKE DOWN OF SPLENIC FLEXURE, SIGMOIDOSCOPY, MOBILIZATION OF RIGHT COLON, PRIMARY ANASTOMOSIS EEA 34, TAP BLOCK;  Surgeon: Donna Dodson MD;  Location[facility];  Service: General   • VT TEAEC W/PATCH GRF CAROTID VERTB [address] Left 1/31/2023    Procedure: EXPLORATION OF LEFT CAROTID ARTERY; ABORTED ENDARTERECTOMY ARTERY CAROTID;  Surgeon: Juanita Whyte DO;  Location: [facility];  Service: Vascular   • SUPERIOR MESTENTERIC ARTERY STENT     • WISDOM TOOTH EXTRACTION         Family History   Problem Relation Age of Onset   • Lung cancer Mother 61   • Brain cancer Mother 61   • Diabetes Father    • Depression Father    • Other Father         septic   • Allergies Sister    • Hashimoto's thyroiditis Sister    • Abdominal aortic aneurysm Sister    • Diabetes Sister    • No Known Problems Sister    • No Known Problems Maternal Grandmother    • No Known Problems Maternal Grandfather    • No Known Problems Paternal Grandmother    • No Known Problems Paternal Grandfather    • Hodgkin's lymphoma Brother    • No Known Problems Brother    • No Known Problems Maternal Aunt    • Diabetes Other      I have reviewed and agree with the history as documented. E-Cigarette/Vaping   • E-Cigarette Use Never User      E-Cigarette/Vaping Substances   • Nicotine No    • THC No    • CBD No    • Flavoring No    • Other No    • Unknown No      Social History     Tobacco Use   • Smoking status: Former     Packs/day: 0.50     Years: 10.00     Total pack years: 5.00     Types: Cigarettes     Quit date:      Years since quittin.6     Passive exposure: Never   • Smokeless tobacco: Never   Vaping Use   • Vaping Use: Never used   Substance Use Topics   • Alcohol use: Not Currently   • Drug use: No       Review of Systems   Gastrointestinal: Positive for abdominal pain and nausea. All other systems reviewed and are negative. Physical Exam  Physical Exam  General Appearance: alert and oriented, nad, non toxic appearing  Skin:  Warm, dry, intact. No cyanosis  HEENT: Atraumatic, normocephalic. No eye drainage. Normal hearing. Moist mucous membranes. Neck: Supple, trachea midline  Cardiac: RRR; no murmurs, rub, gallops. No pedal edema, 2+ pulses  Pulmonary: lungs CTAB; no wheezes, rales, rhonchi  Gastrointestinal: abdomen soft, generalized tenderness, nondistended; no guarding or rebound tenderness; good bowel sounds, no mass or bruits  Extremities:  No deformities.   No calf tenderness, no clubbing  Neuro:  no focal motor or sensory deficits, CN 2-12 grossly intact  Psych:  Normal mood and affect, normal judgement and insight      Vital Signs  ED Triage Vitals [23 1556]   Temperature Pulse Respirations Blood Pressure SpO2   97.6 °F (36.4 °C) 70 18 131/62 97 %      Temp Source Heart Rate Source Patient Position - Orthostatic VS BP Location FiO2 (%)   Oral Monitor Sitting Right arm --      Pain Score       5           Vitals:    09/08/23 1556 09/08/23 1728 09/08/23 1855 09/08/23 2038   BP: 131/62 129/58 121/58 166/76   Pulse: 70 60 61 63   Patient Position - Orthostatic VS: Sitting Sitting Sitting          Visual Acuity      ED Medications  Medications   acetaminophen (TYLENOL) tablet 650 mg (650 mg Oral Given 9/8/23 2118)       Diagnostic Studies  Results Reviewed     Procedure Component Value Units Date/Time    Lactic acid, plasma (w/reflex if result > 2.0) [[id number]]  (Normal) Collected: 09/08/23 1636    Lab Status: Final result Specimen: Blood from Arm, Right Updated: 09/08/23 1702     LACTIC ACID 1.5 mmol/L     Narrative:      Result may be elevated if tourniquet was used during collection.     Basic metabolic panel [[id number]]  (Abnormal) Collected: 09/08/23 1636    Lab Status: Final result Specimen: Blood from Arm, Right Updated: 09/08/23 1701     Sodium 138 mmol/L      Potassium 3.1 mmol/L      Chloride 100 mmol/L      CO2 27 mmol/L      ANION GAP 11 mmol/L      BUN 13 mg/dL      Creatinine 0.78 mg/dL      Glucose 128 mg/dL      Calcium 9.5 mg/dL      eGFR 80 ml/min/1.73sq m     Narrative:      [place] guidelines for Chronic Kidney Disease (CKD):   •  Stage 1 with normal or high GFR (GFR > 90 mL/min/1.73 square meters)  •  Stage 2 Mild CKD (GFR = 60-89 mL/min/1.73 square meters)  •  Stage 3A Moderate CKD (GFR = 45-59 mL/min/1.73 square meters)  •  Stage 3B Moderate CKD (GFR = 30-44 mL/min/1.73 square meters)  •  Stage 4 Severe CKD (GFR = 15-29 mL/min/1.73 square meters)  •  Stage 5 End Stage CKD (GFR <15 mL/min/1.73 square meters)  Note: GFR calculation is accurate only with a steady state creatinine    CBC and differential [[id number]]  (Abnormal) Collected: 09/08/23 1636    Lab Status: Final result Specimen: Blood from Arm, Right Updated: 09/08/23 1647     WBC 5.96 Thousand/uL      RBC 4.02 Million/uL      Hemoglobin 11.4 g/dL      Hematocrit 35.7 %      MCV 89 fL      MCH 28.4 pg      MCHC 31.9 g/dL      RDW 12.6 %      MPV 9.0 fL      Platelets 541 Thousands/uL      nRBC 0 /100 WBCs      Neutrophils Relative 54 %      Immat GRANS % 0 %      Lymphocytes Relative 33 %      Monocytes Relative 8 %      Eosinophils Relative 4 %      Basophils Relative 1 %      Neutrophils Absolute 3.25 Thousands/µL      Immature Grans Absolute 0.02 Thousand/uL      Lymphocytes Absolute 1.95 Thousands/µL      Monocytes Absolute 0.46 Thousand/µL      Eosinophils Absolute 0.24 Thousand/µL      Basophils Absolute 0.04 Thousands/µL                  No orders to display              Procedures  Procedures         ED Course  ED Course as of 09/08/23 2238   [facility] Sep 08, 2023   1644 Surgery to see in consult when labs result   1703 Potassium(!): 3.1  Likely due to decreased PO intake   1703 Labs, including lactate, within normal limits   1730 Patient updated on lab results. She is agreeable to surgical consult, aware that resident is in the OR currently. 2110 Pt still agreeable to await surgical consult. Requesting tylenol for pain. 2224 Pt evaluated by surgery, cleared for discharge home. Recommending pain control with slow advancements in diet. Will proceed with discharge home, strict return precautions discussed. On review of [place] PMED, pt is also prescribed tramadol by her PCP.   2230 Pt updated on surgery recommendation. Agreeable to slowly advancing diet and outpatient follow up. SBIRT 20yo+    Flowsheet Row Most Recent Value   Initial Alcohol Screen: US AUDIT-C     1. How often do you have a drink containing alcohol? 0 Filed at: 09/08/2023 1557   2. How many drinks containing alcohol do you have on a typical day you are drinking? 0 Filed at: 09/08/2023 1557   3b. FEMALE Any Age, or MALE 65+: How often do you have 4 or more drinks on one occassion?  0 Filed at: 09/08/2023 1557   Audit-C Score 0 Filed at: 09/08/2023 1557   UMESH: How many times in the past year have you. .. Used an illegal drug or used a prescription medication for non-medical reasons? Never Filed at: 09/08/2023 1650                    Medical Decision Making  Amount and/or Complexity of Data Reviewed  Labs: ordered. Decision-making details documented in ED Course. Risk  OTC drugs. Disposition  Final diagnoses:   Omental infarction [facility])     Time reflects when diagnosis was documented in both MDM as applicable and the Disposition within this note     Time User Action Codes Description Comment    9/8/2023  4:44 PM Dorian Reasoner Add [K55.069] Omental infarction [facility])       ED Disposition     ED Disposition   Discharge    Condition   Stable    Date/Time   Fri Sep 8, 2023 10:25 PM    Comment   Norris Vera discharge to home/self care.                Follow-up Information     Follow up With Specialties Details Why Contact Info Additional Information    Rojelio Benites MD General Surgery Schedule an appointment as soon as possible for a visit  For re-evaluation [address] [facility]  [address] [place] [address] [facility]       [address] [facility] Emergency Department Emergency Medicine Go to  For re-evaluation [address] [id number]  [address] [facility] Emergency Department, [address] [facility], [place], [place], [address]          Current Discharge Medication List      CONTINUE these medications which have NOT CHANGED    Details   acetaminophen (TYLENOL) 325 mg tablet Take 2 tablets (650 mg total) by mouth every 6 (six) hours  Refills: 0    Associated Diagnoses: Acute postoperative pain      amiodarone 200 mg tablet TAKE ONE TABLET BY MOUTH ONCE DAILY  Qty: 30 tablet, Refills: 5    Associated Diagnoses: Paroxysmal atrial fibrillation (HCC)      Ascorbic Acid (vitamin C) 1000 MG tablet Take 1 tablet (1,000 mg total) by mouth daily  Qty: 30 tablet, Refills: 0    Associated Diagnoses: Colitis      aspirin 81 mg chewable tablet Chew 1 tablet (81 mg total) daily  Refills: 0    Associated Diagnoses: Carotid artery stenosis      Cholecalciferol 25 MCG (1000 UT) tablet Take 1,000 Units by mouth daily      dexamethasone (DECADRON) 4 mg tablet 1 tab PO with breakfast for 1 to 5 days for unrelenting migraine  Qty: 5 tablet, Refills: 0    Associated Diagnoses: Chronic migraine without aura without status migrainosus, not intractable      diltiazem (CARDIZEM CD) 120 mg 24 hr capsule Take 1 capsule (120 mg total) by mouth daily  Qty: 30 capsule, Refills: 0    Associated Diagnoses: Paroxysmal atrial fibrillation (HCC)      Docusate Sodium (COLACE PO) Take by mouth daily at bedtime      Eliquis 5 MG TAKE ONE TABLET BY MOUTH TWICE DAILY  Qty: 180 tablet, Refills: 0    Associated Diagnoses: Paroxysmal atrial fibrillation (HCC)      famotidine (PEPCID) 20 mg tablet Take 20 mg by mouth daily at bedtime      fexofenadine (ALLEGRA) 180 MG tablet Take 180 mg by mouth daily      fluticasone-vilanterol (BREO ELLIPTA) 200-25 MCG/INH inhaler Inhale 1 puff daily Rinse mouth after use.   Qty: 3 Inhaler, Refills: 3    Associated Diagnoses: Wheezing; Acute upper respiratory infection      furosemide (LASIX) 40 mg tablet Take 1 tablet (40 mg total) by mouth daily  Qty: 90 tablet, Refills: 0    Associated Diagnoses: Benign essential hypertension; Essential hypertension      HYDROcodone-acetaminophen (Norco) 5-325 mg per tablet Take 1 tablet by mouth every 6 (six) hours as needed for pain Max Daily Amount: 4 tablets  Qty: 10 tablet, Refills: 0    Associated Diagnoses: S/P small bowel resection      hydrOXYzine HCL (ATARAX) 25 mg tablet TAKE TWO TABLETS BY MOUTH DAILY AT BEDTIME  Qty: 90 tablet, Refills: 0    Associated Diagnoses: Interstitial cystitis      LORazepam (Ativan) 1 mg tablet Take 1 tablet (1 mg total) by mouth every 8 (eight) hours as needed for anxiety  Qty: 30 tablet, Refills: 0    Associated Diagnoses: Anxiety      magnesium Oxide (MAG-OX) 400 mg TABS Take 1 tablet (400 mg total) by mouth daily at bedtime  Qty: 90 tablet, Refills: 3    Associated Diagnoses: Chronic migraine without aura without status migrainosus, not intractable      methocarbamol (ROBAXIN) 500 mg tablet Take 1 tablet (500 mg total) by mouth 3 (three) times a day  Qty: 90 tablet, Refills: 2    Associated Diagnoses: Lumbar radiculopathy      metoprolol succinate (TOPROL-XL) 100 mg 24 hr tablet Take 1 tablet (100 mg total) by mouth 2 (two) times a day  Qty: 180 tablet, Refills: 0    Associated Diagnoses: Essential hypertension      montelukast (SINGULAIR) 10 mg tablet TAKE ONE TABLET BY MOUTH DAILY AT BEDTIME  Qty: 90 tablet, Refills: 0    Associated Diagnoses: Seasonal allergic rhinitis due to pollen      MULTIPLE VITAMIN PO Take by mouth      naloxone (NARCAN) 4 mg/0.1 mL nasal spray Administer 1 spray into a nostril. If no response after 2-3 minutes, give another dose in the other nostril using a new spray. Qty: 1 each, Refills: 1    Associated Diagnoses: Anxiety      omeprazole (PriLOSEC) 40 MG capsule Take 1 capsule (40 mg total) by mouth daily  Qty: 90 capsule, Refills: 3    Associated Diagnoses: Gastroesophageal reflux disease, unspecified whether esophagitis present      promethazine (PHENERGAN) 25 mg tablet Take 1 tablet (25 mg total) by mouth every 6 (six) hours as needed for nausea or vomiting  Qty: 60 tablet, Refills: 5    Associated Diagnoses: Nausea      Repatha SureClick 716 MG/ML SOAJ Inject 140mg under the skin every 2 weeks.   Qty: 2 mL, Refills: 11    Associated Diagnoses: Other hyperlipidemia      spironolactone (ALDACTONE) 25 mg tablet TAKE ONE TABLET BY MOUTH TWICE DAILY  Qty: 180 tablet, Refills: 0    Associated Diagnoses: Benign essential hypertension      sucralfate (CARAFATE) 1 g tablet Take 1 tablet (1 g total) by mouth 2 (two) times a day before meals  Qty: 60 tablet, Refills: 5    Associated Diagnoses: Gastroesophageal reflux disease without esophagitis traMADol (ULTRAM) 50 mg tablet Take 2 tablets (100 mg total) by mouth every 8 (eight) hours as needed for severe pain  Qty: 180 tablet, Refills: 0    Associated Diagnoses: Anxiety; S/P small bowel resection; Lumbar radiculopathy             No discharge procedures on file.     PDMP Review       Value Time User    PDMP Reviewed  Yes 9/8/2023 10:26 PM [address], DO          ED Provider  Electronically Signed by           Lorrie Willoughby DO  09/08/23 3420

## 2023-09-08 NOTE — TELEPHONE ENCOUNTER
Note for when Dr Jaimie Gray returns:    *Pt called 9/8. Had CTscan of abd & pelvis this a.m.  *TYRESE Hammer called pt w/results and told pt to proceed to ER, but pt wanted to s/w Dr Jaimie Gray first since she did surgery on 7/19. *Pt said she didn't want to go to ER due to the long wait. Pt is nervous about the new information based on the CTscan. *Said in 2021 she had an issue with an artery and was concerned about that. *Read TYRESE Hammer's note to pt and pt said she will go to the ER and explain her concerns to them and will mention the 7/19 procedure to them as well. Put this as a "high priority" note so Damien Goodwin can see it and then discuss w/Dr Jaimie Gray upon her return.

## 2023-09-09 NOTE — CONSULTS
Consultation - General Surgery  Mala Dumont 70 y.o. female MRN: [id number]  Unit/Bed#: ED-05 Encounter: [id number]        Assessment/Plan     Assessment:  15-year-old female who presents with minimal abdominal pain in the setting of omental infarction. Plan:  Recommend prescribing NSAIDs to help with inflammatory process  Recommend diet as tolerated  No surgical intervention at this time  Recommend patient follow-up with PCP  Safe for discharge from surgical perspective  Rest of care per ED while in the ED    History of Present Illness     HPI:  Mala Dumont is a 70 y.o. female who presents with abdominal pain. Patient states that she has had abdominal pain since Tuesday. She endorses nausea that has been persistent over the past month. She states that she is having bowel movements and that she is voiding without difficulty. Patient denies having fevers or chills. Past medical history is pertinent for A-fib on Eliquis, anxiety, asthma, chronic back pain, GERD, HTN, HLD, more recently on 7/19 an ex-lap with resection of transverse colon, splenic flexure, descending colon, primary anastomosis for treatment of ischemic colitis by Dr. Barber Juarez. On presentation to the ED the patient was afebrile with stable vitals. Labs were within normal limits. 9/8 CT abdomen pelvis with contrast demonstrated 5.3 x 3.8 x 2.4 omental infarction. No other acute findings on her CT scan. Recommend conservative management with NSAIDs, rest, advancement of diet as tolerated. Safe for discharge from a surgical perspective. Review of Systems   Constitutional: Negative for activity change, appetite change, chills and fever. HENT: Negative for congestion, sinus pressure, sinus pain and sneezing. Eyes: Negative for visual disturbance. Respiratory: Negative for chest tightness and shortness of breath. Cardiovascular: Negative for chest pain and palpitations. Gastrointestinal: Positive for abdominal pain and nausea. Negative for vomiting. Endocrine: Negative for polydipsia and polyuria. Genitourinary: Negative for difficulty urinating. Musculoskeletal: Negative for back pain. Skin: Negative for wound. Neurological: Negative for dizziness and headaches. Psychiatric/Behavioral: Negative for agitation and confusion. Historical Information   Past Medical History:   Diagnosis Date   • A-fib ([address]) 03/2020   • Allergic    • Anxiety    • Arthritis    • Asthma    • Back pain     and muscle pain   • Bruises easily    • Chronic pain disorder     Interstitial pain   • Colon polyp    • Dental bridge present    • Depression    • Eczema    • GERD (gastroesophageal reflux disease)    • Heart murmur    • History of blood clots     per pt "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"   • History of pneumonia    • Hives    • Hyperlipidemia    • Hypertension    • Infertility, female    • Interstitial cystitis    • Migraine     sees neurologist   • Motion sickness    • Palpitations    • PONV (postoperative nausea and vomiting)    • Premature atrial contractions 11/9/2022   • Psychiatric disorder    • TIA (transient ischemic attack) 09/2022    per pt --"had MRI and 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"   • Urinary incontinence     wears Pads   • Venous insufficiency 8/1/2017   • Wears glasses      Past Surgical History:   Procedure Laterality Date   • COLONOSCOPY     • DILATION AND CURETTAGE OF UTERUS     • EGD     • EXPLORATORY LAPAROTOMY      for infertility   • EXPLORATORY LAPAROTOMY W/ BOWEL RESECTION N/A 7/19/2023    Procedure: LAPAROTOMY EXPLORATORY W/ BOWEL RESECTION;  Surgeon: Jalen Patten MD;  [facility];  Service: General   • HAND SURGERY      Hand excision of tendon cyst   • CT LAPAROSCOPIC APPENDECTOMY N/A 05/03/2022    Procedure: APPENDECTOMY LAPAROSCOPIC;  Surgeon:  Annelise You MD;  Location:  MAIN OR;  Service: General   • CT LAPS ABD PRTM&OMENTUM DX W/WO SPEC BR/WA SPX N/A 7/19/2023 Procedure: LAPAROSCOPY DIAGNOSTIC, LYSIS OF ADHESIONS, LAPAROSCOPY TAKE TAKEDOWN OF LEFT COLON, EXPLORATORY LAPAROSCOPY, LYSIS OF ADHESIONS, RESECTION OF TRANSVERSE COLON SPLENIC FLEXURE AND DESCENDING COLON , TAKE DOWN OF SPLENIC FLEXURE, SIGMOIDOSCOPY, MOBILIZATION OF RIGHT COLON, PRIMARY ANASTOMOSIS EEA 29, TAP BLOCK;  Surgeon: Josh Montiel MD;  [facility];  Service: General   • CA TEAEC W/PATCH GRF CAROTID VERTB SUBCLAV NECK INC Left 2023    Procedure: EXPLORATION OF LEFT CAROTID ARTERY; ABORTED ENDARTERECTOMY ARTERY CAROTID;  Surgeon: Mirta London DO;  [facility];  Service: Vascular   • SUPERIOR MESTENTERIC ARTERY STENT     • WISDOM TOOTH EXTRACTION       Social History   Social History     Substance and Sexual Activity   Alcohol Use Not Currently     Social History     Substance and Sexual Activity   Drug Use No     Social History     Tobacco Use   Smoking Status Former   • Packs/day: 0.50   • Years: 10.00   • Total pack years: 5.00   • Types: Cigarettes   • Quit date:    • Years since quittin.6   • Passive exposure: Never   Smokeless Tobacco Never     Family History: non-contributory    Meds/Allergies   all medications and allergies reviewed  Allergies   Allergen Reactions   • Ace Inhibitors Angioedema and Anaphylaxis     Anaphylaxis   • Benicar [Olmesartan] Angioedema     See Allergy note from 2008. Swollen ankles/legs   • Hydralazine Other (See Comments)     Lip swelling  Flank pain   • Other Anaphylaxis and Other (See Comments)     Other reaction(s): angioadema  Other reaction(s):  Other (See Comments)  Preservatives- itching throat closes, hi  Other reaction(s): angioadema  E Z Cat - hives throat closes itching,  Preservatives- itching throat closes, hi  Artificial sweeteners   • Valsartan Angioedema     Lips, face swollen   • Sulfa Antibiotics Hives     stuffiness,itching,hives,throat closing--per pt "sometimes able to take depending on the brand and the filler or possibly preservatives in it"   • Ampicillin Hives     Depends on brand some preservatives can react. Has recently tolerated oral amoxicillin. • Motrin [Ibuprofen] Other (See Comments)     Per pt " told not to take due to A Fib"   • Wound Dressing Adhesive Other (See Comments)     Per pt Adhesive on EKG leads caused redness       Objective   First Vitals:   Blood Pressure: 131/62 (09/08/23 1556)  Pulse: 70 (09/08/23 1556)  Temperature: 97.6 °F (36.4 °C) (09/08/23 1556)  Temp Source: Oral (09/08/23 1556)  Respirations: 18 (09/08/23 1556)  Weight - Scale: 81.1 kg (178 lb 12.7 oz) (09/08/23 1556)  SpO2: 97 % (09/08/23 1556)    Current Vitals:   Blood Pressure: 166/76 (09/08/23 2038)  Pulse: 63 (09/08/23 2038)  Temperature: 97.6 °F (36.4 °C) (09/08/23 1556)  Temp Source: Oral (09/08/23 1556)  Respirations: 18 (09/08/23 2038)  Weight - Scale: 81.1 kg (178 lb 12.7 oz) (09/08/23 1556)  SpO2: 97 % (09/08/23 2038)    No intake or output data in the 24 hours ending 09/08/23 2239    Invasive Devices     Peripheral Intravenous Line  Duration           Peripheral IV 09/08/23 Right;Ventral (anterior) Forearm <1 day                Physical Exam:  General: No acute distress  Neuro: Alert, oriented to person and place  Eyes: Extraocular movements intact  CV: Well perfused, regular rate and rhythm  Lungs: Normal work of breathing, no increased respiratory effort  Abdomen: Soft, minimally tender, non-distended. Previous ex-lap incisions appears to be clean, dry, intact. Extremities: No edema, clubbing or cyanosis  Skin: Warm, dry  Lymph: No palpable lymph nodes  Psych: Normal mentation      Lab Results: I have personally reviewed pertinent lab results. Imaging: I have personally reviewed pertinent reports. EKG, Pathology, and Other Studies: I have personally reviewed pertinent reports.       Code Status: Prior  Advance Directive and Living Will:      Power of :    POLST:      Taqueria Medellin MD  General Surgery Resident

## 2023-09-09 NOTE — DISCHARGE INSTRUCTIONS
Start with a clear diet and slowly advance back to a regular diet when able. You can take tylenol and tramadol as needed for pain.

## 2023-09-11 ENCOUNTER — VBI (OUTPATIENT)
Dept: FAMILY MEDICINE CLINIC | Facility: CLINIC | Age: 63
End: 2023-09-11

## 2023-09-11 NOTE — TELEPHONE ENCOUNTER
Note to chart:  Pt made appt to see Dr Susan Loyd on 9/25/23 b/c she said she was still confused a bit by info relayed to her on 9/8 ER visit.

## 2023-09-11 NOTE — TELEPHONE ENCOUNTER
09/11/23 10:41 AM    Patient contacted post ED visit, first outreach attempt made. Message was left for patient to return a call to the VBI Department at [facility]: Phone [phone]. Thank you.   Mary Lou Worrell MA  [facility]

## 2023-09-11 NOTE — TELEPHONE ENCOUNTER
Pt said she was still a bit confused about information given to her at her 9/8/23 ER visit. Made appt to discuss w/Dr Sofia Keenan on 9/25.

## 2023-09-11 NOTE — LETTER
[address]  [address] [facility] [id number]    Date: 09/13/23  Chapito Bailey  [address]    Dear Bess Rich: Thank you for choosing [facility] emergency department for care. Your primary care provider wants to make sure that your ongoing medical care is being addressed. If you require follow up care as a result of your emergency department visit, there are a few things the practice would like you to know. As part of the network's continuing commitment to caring for our patients, we have added more same day appointments and have extended office hours to meet your medical needs. After hours, on-call physicians are available via your primary care provider's main office line. We encourage you to contact our office prior to seeking treatment to discuss your symptoms with the medical staff. Together, we can determine the correct course of action. A majority of non-emergent conditions such as: common cold, flu-like symptoms, fevers, strains/sprains, dislocations, minor burns, cuts and animal bites can be treated at [facility] facilities. Diagnostic testing is available at some sites. Of course, if you are experiencing a life threatening medical emergency call 911 or proceed directly to the nearest emergency room.     Your nearest [facility] facility is conveniently located at:    [facility]  [address] [facility] [address] [facility], [facility] [facility]  [phone]  [address] [place] [address] offered at [address] your spot online at [email or web address] or on the [address] [place] [address] Drive    Sincerely,    [address]  Dept: [phone]

## 2023-09-12 ENCOUNTER — HOME CARE VISIT (OUTPATIENT)
Dept: HOME HEALTH SERVICES | Facility: HOME HEALTHCARE | Age: 63
End: 2023-09-12
Payer: COMMERCIAL

## 2023-09-12 ENCOUNTER — HOSPITAL ENCOUNTER (OUTPATIENT)
Dept: NON INVASIVE DIAGNOSTICS | Facility: CLINIC | Age: 63
Discharge: HOME | End: 2023-09-12
Payer: COMMERCIAL

## 2023-09-12 DIAGNOSIS — K21.9 GASTROESOPHAGEAL REFLUX DISEASE WITHOUT ESOPHAGITIS: ICD-10-CM

## 2023-09-12 DIAGNOSIS — I65.29 STENOSIS OF CAROTID ARTERY, UNSPECIFIED LATERALITY: ICD-10-CM

## 2023-09-12 DIAGNOSIS — I10 ESSENTIAL HYPERTENSION: ICD-10-CM

## 2023-09-12 DIAGNOSIS — I65.22 ASYMPTOMATIC CAROTID ARTERY STENOSIS WITHOUT INFARCTION, LEFT: ICD-10-CM

## 2023-09-12 PROCEDURE — 93880 EXTRACRANIAL BILAT STUDY: CPT

## 2023-09-12 PROCEDURE — 93880 EXTRACRANIAL BILAT STUDY: CPT | Performed by: SURGERY

## 2023-09-12 RX ORDER — METOPROLOL SUCCINATE 100 MG/1
100 TABLET, EXTENDED RELEASE ORAL 2 TIMES DAILY
Qty: 180 TABLET | Refills: 0 | Status: SHIPPED | OUTPATIENT
Start: 2023-09-12

## 2023-09-12 RX ORDER — SUCRALFATE 1 G/1
1 TABLET ORAL
Qty: 60 TABLET | Refills: 0 | Status: SHIPPED | OUTPATIENT
Start: 2023-09-12

## 2023-09-12 NOTE — TELEPHONE ENCOUNTER
09/12/23 9:49 AM    Patient contacted post ED visit, second outreach attempt made. Message was left for patient to return a call to the VBI Department at [facility]: Phone [phone]. Thank you.   Mary Lou Worrell MA  [facility]

## 2023-09-13 NOTE — TELEPHONE ENCOUNTER
09/13/23 10:25 AM    Patient contacted post ED visit, phone outreaches were unsuccessful and a MyChart letter has been sent to the patient as follow-up. Thank you.   Mary Lou Worrell MA  [facility]

## 2023-09-14 ENCOUNTER — HOME CARE VISIT (OUTPATIENT)
Dept: HOME HEALTH SERVICES | Facility: HOME HEALTHCARE | Age: 63
End: 2023-09-14
Payer: COMMERCIAL

## 2023-09-14 VITALS
DIASTOLIC BLOOD PRESSURE: 80 MMHG | SYSTOLIC BLOOD PRESSURE: 128 MMHG | RESPIRATION RATE: 16 BRPM | OXYGEN SATURATION: 99 % | TEMPERATURE: 97.9 F | HEART RATE: 55 BPM

## 2023-09-14 DIAGNOSIS — F41.9 ANXIETY: ICD-10-CM

## 2023-09-14 DIAGNOSIS — M54.16 LUMBAR RADICULOPATHY: ICD-10-CM

## 2023-09-14 DIAGNOSIS — Z90.49 S/P SMALL BOWEL RESECTION: ICD-10-CM

## 2023-09-14 PROCEDURE — G0299 HHS/HOSPICE OF RN EA 15 MIN: HCPCS

## 2023-09-15 DIAGNOSIS — F41.9 ANXIETY: ICD-10-CM

## 2023-09-15 DIAGNOSIS — M54.16 LUMBAR RADICULOPATHY: ICD-10-CM

## 2023-09-15 DIAGNOSIS — Z90.49 S/P SMALL BOWEL RESECTION: ICD-10-CM

## 2023-09-15 RX ORDER — LORAZEPAM 1 MG/1
TABLET ORAL
Qty: 30 TABLET | Refills: 0 | Status: SHIPPED | OUTPATIENT
Start: 2023-09-15

## 2023-09-15 RX ORDER — TRAMADOL HYDROCHLORIDE 50 MG/1
TABLET ORAL
Qty: 180 TABLET | Refills: 0 | Status: SHIPPED | OUTPATIENT
Start: 2023-09-15

## 2023-09-15 RX ORDER — TRAMADOL HYDROCHLORIDE 50 MG/1
100 TABLET ORAL EVERY 8 HOURS PRN
Qty: 180 TABLET | Refills: 0 | OUTPATIENT
Start: 2023-09-15

## 2023-09-19 DIAGNOSIS — I48.0 PAROXYSMAL ATRIAL FIBRILLATION (HCC): ICD-10-CM

## 2023-09-19 RX ORDER — DILTIAZEM HYDROCHLORIDE 120 MG/1
120 CAPSULE, COATED, EXTENDED RELEASE ORAL DAILY
Qty: 30 CAPSULE | Refills: 3 | Status: SHIPPED | OUTPATIENT
Start: 2023-09-19

## 2023-09-21 NOTE — PROGRESS NOTES
Assessment/Plan:   Emiliana Clark is a 70 y. o.female who comes in today for postoperative check after LAPAROTOMY EXPLORATORY W/ BOWEL RESECTION. LAPAROSCOPY DIAGNOSTIC. LYSIS OF ADHESIONS. LAPARASCOPY TAKE TAKEDOWN OF LEFT COLON.  EXPLORATORY LAPAROSCOPY LYSIS OF ADHESIONS. RESECTION TRANSVERSE COLON , SPLENIC FLEXURE, . AND DESCENDING COLON . TAKE DOWN OF SPLENIC FLEXURE. SIGMOIDOSCOPY. MOBILIZATION OF RIGHT COLON. PRIMARY ANASTOMOSIS EEA 29. TAP BLOCK on 7/19/23 with Dr. Fabiola Workman. Here for concerns regarding her relatively recent episode of right upper quadrant pain. CAT scan showed an omental infarct, and this was back on September 8, 2023. She is fairly functional and has had no other GI symptoms. no fevers, chills, nausea or vomiting. Move her bowels almost every day occasional constipation. Actually doing remarkably well and has lost additional pounds intentionally    Wound is well-healed now. Plan:    Continue aggressive self hydration. No findings for this omental infarct which could be a bruise. There is no surgical intervention at this time and symptomatic treatment is all that is needed. Her back on an as-needed basis but it is good to see her doing well and losing weight. Her last colonoscopy was July 2022 and was fairly normal other than diverticulosis      Otherwise she is continue to her regular medical care and cardiac care.          ______________________________________________________  HPI:  Emiliana Clark is a 70 y. o.female who comes in today for postoperative check after recent recent laparotomy.     Procedures Performed:   ROS:  General ROS: negative for - chills, fatigue, fever or night sweats, weight loss  Respiratory ROS: no cough, shortness of breath, or wheezing  Cardiovascular ROS: no chest pain or dyspnea on exertion  Genito-Urinary ROS: no dysuria, trouble voiding, or hematuria  Musculoskeletal ROS: negative for - gait disturbance, joint pain or muscle pain  Neurological ROS: no TIA or stroke symptoms  GI ROS: see HPI  Skin ROS: no new rashes or lesions   Lymphatic ROS: no new adenopathy noted by pt.    GYN ROS: see HPI, no new GYN history or bleeding noted  Psy ROS: no new mental or behavioral disturbances         Patient Active Problem List   Diagnosis   • Angina pectoris ([address])   • Essential hypertension   • Migraines   • AMD (age-related macular degeneration), bilateral   • Allergic reaction   • Gastroesophageal reflux disease without esophagitis   • Allergic conjunctivitis of both eyes   • Angio-edema   • Paroxysmal atrial fibrillation (HCC)   • Lumbar radiculopathy   • CAMILLE (obstructive sleep apnea)   • Hypertensive heart disease with congestive heart failure (HCC)   • Unspecified diastolic (congestive) heart failure (HCC)   • 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HCC)   • COPD (chronic obstructive pulmonary disease) (HCC)   • Urinary retention   • Peripheral vascular disease ([address])   • Appendix disease   • Carotid artery stenosis   • Stenosis of left carotid artery   • Asymptomatic stenosis of left carotid artery   • Hepatic steatosis   • Partial facial nerve palsy   • Injury of left facial nerve   • Colitis   • Chronic migraine w/o aura w/o status migrainosus, not intractable   • IIH (idiopathic intracranial hypertension)   • Hematochezia   • Left carotid stenosis   • Colonic ischemia (HCC)   • Acute postoperative pain   • S/P small bowel resection   • Anxiety       Allergies:  Ace inhibitors, Benicar [olmesartan], Hydralazine, Other, Valsartan, Sulfa antibiotics, Ampicillin, Motrin [ibuprofen], and Wound dressing adhesive      Current Outpatient Medications:   •  acetaminophen (TYLENOL) 325 mg tablet, Take 2 tablets (650 mg total) by mouth every 6 (six) hours, Disp: , Rfl: 0  •  amiodarone 200 mg tablet, TAKE ONE TABLET BY MOUTH ONCE DAILY, Disp: 30 tablet, Rfl: 5  •  aspirin 81 mg chewable tablet, Chew 1 tablet (81 mg total) daily, Disp: , Rfl: 0  •  Cholecalciferol 25 MCG (1000 UT) tablet, Take 1,000 Units by mouth daily, Disp: , Rfl:   •  dexamethasone (DECADRON) 4 mg tablet, 1 tab PO with breakfast for 1 to 5 days for unrelenting migraine, Disp: 5 tablet, Rfl: 0  •  diltiazem (CARDIZEM CD) 120 mg 24 hr capsule, TAKE ONE CAPSULE BY MOUTH ONCE DAILY, Disp: 30 capsule, Rfl: 3  •  Docusate Sodium (COLACE PO), Take by mouth daily at bedtime, Disp: , Rfl:   •  Eliquis 5 MG, TAKE ONE TABLET BY MOUTH TWICE DAILY, Disp: 180 tablet, Rfl: 0  •  famotidine (PEPCID) 20 mg tablet, Take 20 mg by mouth daily at bedtime, Disp: , Rfl:   •  fexofenadine (ALLEGRA) 180 MG tablet, Take 180 mg by mouth daily, Disp: , Rfl:   •  fluticasone-vilanterol (BREO ELLIPTA) 200-25 MCG/INH inhaler, Inhale 1 puff daily Rinse mouth after use., Disp: 3 Inhaler, Rfl: 3  •  furosemide (LASIX) 40 mg tablet, Take 1 tablet (40 mg total) by mouth daily, Disp: 90 tablet, Rfl: 0  •  HYDROcodone-acetaminophen (Norco) 5-325 mg per tablet, Take 1 tablet by mouth every 6 (six) hours as needed for pain Max Daily Amount: 4 tablets, Disp: 10 tablet, Rfl: 0  •  hydrOXYzine HCL (ATARAX) 25 mg tablet, TAKE TWO TABLETS BY MOUTH DAILY AT BEDTIME, Disp: 90 tablet, Rfl: 0  •  LORazepam (ATIVAN) 1 mg tablet, TAKE ONE TABLET BY MOUTH EVERY EIGHT HOURS AS NEEDED FOR ANXIETY, Disp: 30 tablet, Rfl: 0  •  magnesium Oxide (MAG-OX) 400 mg TABS, Take 1 tablet (400 mg total) by mouth daily at bedtime, Disp: 90 tablet, Rfl: 3  •  methocarbamol (ROBAXIN) 500 mg tablet, Take 1 tablet (500 mg total) by mouth 3 (three) times a day, Disp: 90 tablet, Rfl: 2  •  metoprolol succinate (TOPROL-XL) 100 mg 24 hr tablet, Take 1 tablet (100 mg total) by mouth 2 (two) times a day, Disp: 180 tablet, Rfl: 0  •  montelukast (SINGULAIR) 10 mg tablet, TAKE ONE TABLET BY MOUTH DAILY AT BEDTIME, Disp: 90 tablet, Rfl: 0  •  MULTIPLE VITAMIN PO, Take by mouth, Disp: , Rfl:   •  naloxone (NARCAN) 4 mg/0.1 mL nasal spray, Administer 1 spray into a nostril.  If no response after 2-3 minutes, give another dose in the other nostril using a new spray., Disp: 1 each, Rfl: 1  •  omeprazole (PriLOSEC) 40 MG capsule, Take 1 capsule (40 mg total) by mouth daily, Disp: 90 capsule, Rfl: 3  •  phenazopyridine (PYRIDIUM) 200 mg tablet, Take 1 tablet (200 mg total) by mouth if needed for bladder spasms, Disp: 30 tablet, Rfl: 3  •  promethazine (PHENERGAN) 25 mg tablet, Take 1 tablet (25 mg total) by mouth every 6 (six) hours as needed for nausea or vomiting, Disp: 60 tablet, Rfl: 5  •  Repatha SureClick 465 MG/ML SOAJ, Inject 140mg under the skin every 2 weeks. , Disp: 2 mL, Rfl: 11  •  spironolactone (ALDACTONE) 25 mg tablet, TAKE ONE TABLET BY MOUTH TWICE DAILY, Disp: 180 tablet, Rfl: 0  •  sucralfate (CARAFATE) 1 g tablet, Take 1 tablet (1 g total) by mouth 2 (two) times a day before meals, Disp: 60 tablet, Rfl: 0  •  traMADol (ULTRAM) 50 mg tablet, TAKE TWO TABLETS BY MOUTH EVERY EIGHT HOURS AS NEEDED for severe pain, Disp: 180 tablet, Rfl: 0  •  Ascorbic Acid (vitamin C) 1000 MG tablet, Take 1 tablet (1,000 mg total) by mouth daily, Disp: 30 tablet, Rfl: 0    Current Facility-Administered Medications:   •  cyanocobalamin injection 1,000 mcg, 1,000 mcg, Intramuscular, Q30 Days, Zena Nesbitt DO, 1,000 mcg at 07/07/23 1052    Past Medical History:   Diagnosis Date   • A-fib ([address] [facility]) 03/2020   • Allergic    • Anxiety    • Arthritis    • Asthma    • Back pain     and muscle pain   • Bruises easily    • Chronic pain disorder     Interstitial pain   • Colon polyp    • Dental bridge present    • Depression    • Eczema    • GERD (gastroesophageal reflux disease)    • Heart murmur    • History of blood clots     per pt "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"   • History of pneumonia    • Hives    • Hyperlipidemia    • Hypertension    • Infertility, female    • Interstitial cystitis    • Migraine     sees neurologist   • Motion sickness    • Palpitations    • PONV (postoperative nausea and vomiting)    • Premature atrial contractions 11/9/2022   • Psychiatric disorder    • TIA (transient ischemic attack) 09/2022    per pt --"had MRI and 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"   • Urinary incontinence     wears Pads   • Venous insufficiency 8/1/2017   • Wears glasses        Past Surgical History:   Procedure Laterality Date   • COLONOSCOPY     • DILATION AND CURETTAGE OF UTERUS     • EGD     • EXPLORATORY LAPAROTOMY      for infertility   • EXPLORATORY LAPAROTOMY W/ BOWEL RESECTION N/A 7/19/2023    Procedure: LAPAROTOMY EXPLORATORY W/ BOWEL RESECTION;  Surgeon: Quincy Boyd MD;  [facility];  Service: General   • HAND SURGERY      Hand excision of tendon cyst   • [place] LAPAROSCOPIC APPENDECTOMY N/A 05/03/2022    Procedure: APPENDECTOMY LAPAROSCOPIC;  Surgeon:  Bhupinder Ham MD;  [facility];  Service: General   • [place] LAPS ABD PRTM&OMENTUM DX W/WO SPEC BR/WA SPX N/A 7/19/2023    Procedure: LAPAROSCOPY DIAGNOSTIC, LYSIS OF ADHESIONS, LAPAROSCOPY TAKE TAKEDOWN OF LEFT COLON, EXPLORATORY LAPAROSCOPY, LYSIS OF ADHESIONS, RESECTION OF TRANSVERSE COLON SPLENIC FLEXURE AND DESCENDING COLON , TAKE DOWN OF SPLENIC FLEXURE, SIGMOIDOSCOPY, MOBILIZATION OF RIGHT COLON, PRIMARY ANASTOMOSIS EEA 34, TAP BLOCK;  Surgeon: Quincy Boyd MD;  [facility];  Service: General   • [place] TEAEC W/PATCH GRF CAROTID VERTB [address] Left 1/31/2023    Procedure: EXPLORATION OF LEFT CAROTID ARTERY; ABORTED ENDARTERECTOMY ARTERY CAROTID;  Surgeon: Rosemary Rico DO;  [facility];  Service: Vascular   • SUPERIOR MESTENTERIC ARTERY STENT     • WISDOM TOOTH EXTRACTION         Family History   Problem Relation Age of Onset   • Lung cancer Mother 61   • Brain cancer Mother 61   • Diabetes Father    • Depression Father    • Other Father         septic   • Allergies Sister    • Hashimoto's thyroiditis Sister    • Abdominal aortic aneurysm Sister    • Diabetes Sister    • No Known Problems Sister    • No Known Problems Maternal Grandmother    • No Known Problems Maternal Grandfather    • No Known Problems Paternal Grandmother    • No Known Problems Paternal Grandfather    • Hodgkin's lymphoma Brother    • No Known Problems Brother    • No Known Problems Maternal Aunt    • Diabetes Other         reports that she quit smoking about 4 years ago. Her smoking use included cigarettes. She has a 5.00 pack-year smoking history. She has never been exposed to tobacco smoke. She has never used smokeless tobacco. She reports that she does not currently use alcohol. She reports that she does not use drugs. Vitals:    09/25/23 1345   BP: 124/82   Pulse: 56   Temp: 97.5 °F (36.4 °C)       PHYSICAL EXAM  General: normal, cooperative, no distress  Abdominal: soft, nondistended or nontender  Incision: clean, dry, and intact and   Well-healed. Right upper quadrant mild tenderness but no overt bruising or stigmata. Lungs clear to auscultation  Heart  normal sinus rhythm  Ambulatory without obvious musculoskeletal defect  Neurological exam grossly intact  Skin without jaundice. Some portions of this record may have been generated with voice recognition software. There may be translation, syntax,  or grammatical errors. Occasional wrong word or "sound-a-like" substitutions may have occurred due to the inherent limitations of the voice recognition software. Read the chart carefully and recognize, using context, where substitutions may have occurred. If you have any questions, please contact the dictating provider for clarification or correction, as needed. This encounter has been coded by a non-certified coder.        Coy Bailey MD    Date: 9/25/2023 Time: 2:40 PM

## 2023-09-22 ENCOUNTER — TELEPHONE (OUTPATIENT)
Age: 63
End: 2023-09-22

## 2023-09-22 DIAGNOSIS — N30.10 INTERSTITIAL CYSTITIS: Primary | ICD-10-CM

## 2023-09-22 RX ORDER — PHENAZOPYRIDINE HYDROCHLORIDE 200 MG/1
200 TABLET, FILM COATED ORAL AS NEEDED
Qty: 30 TABLET | Refills: 3 | Status: SHIPPED | OUTPATIENT
Start: 2023-09-22

## 2023-09-22 NOTE — TELEPHONE ENCOUNTER
Pyridium 200mg script BID - for flare ups. It is inactive on her profile she's asking for a new script to be written and sent into [facility].

## 2023-09-25 ENCOUNTER — OFFICE VISIT (OUTPATIENT)
Dept: SURGERY | Facility: CLINIC | Age: 63
End: 2023-09-25

## 2023-09-25 VITALS
TEMPERATURE: 97.5 F | BODY MASS INDEX: 30.39 KG/M2 | HEIGHT: 64 IN | HEART RATE: 56 BPM | SYSTOLIC BLOOD PRESSURE: 124 MMHG | DIASTOLIC BLOOD PRESSURE: 82 MMHG | WEIGHT: 178 LBS

## 2023-09-25 DIAGNOSIS — Z90.49 HISTORY OF COLON RESECTION: ICD-10-CM

## 2023-09-25 DIAGNOSIS — K55.9 COLONIC ISCHEMIA (HCC): ICD-10-CM

## 2023-09-25 DIAGNOSIS — K55.069 MESENTERIC ARTERY THROMBOSIS (HCC): ICD-10-CM

## 2023-09-25 DIAGNOSIS — R93.89 ABNORMAL CAT SCAN: Primary | ICD-10-CM

## 2023-09-25 PROCEDURE — 99024 POSTOP FOLLOW-UP VISIT: CPT | Performed by: SURGERY

## 2023-09-28 ENCOUNTER — OFFICE VISIT (OUTPATIENT)
Dept: FAMILY MEDICINE CLINIC | Facility: CLINIC | Age: 63
End: 2023-09-28
Payer: COMMERCIAL

## 2023-09-28 VITALS
SYSTOLIC BLOOD PRESSURE: 126 MMHG | TEMPERATURE: 97.5 F | BODY MASS INDEX: 30.22 KG/M2 | HEIGHT: 64 IN | HEART RATE: 58 BPM | WEIGHT: 177 LBS | DIASTOLIC BLOOD PRESSURE: 86 MMHG | OXYGEN SATURATION: 98 %

## 2023-09-28 DIAGNOSIS — E87.6 HYPOKALEMIA: ICD-10-CM

## 2023-09-28 DIAGNOSIS — K55.069 OMENTAL INFARCTION (HCC): ICD-10-CM

## 2023-09-28 DIAGNOSIS — I10 ESSENTIAL HYPERTENSION: ICD-10-CM

## 2023-09-28 DIAGNOSIS — F41.9 ANXIETY: ICD-10-CM

## 2023-09-28 DIAGNOSIS — K55.9 COLONIC ISCHEMIA (HCC): Primary | ICD-10-CM

## 2023-09-28 DIAGNOSIS — Z90.49 S/P SMALL BOWEL RESECTION: ICD-10-CM

## 2023-09-28 DIAGNOSIS — R61 NIGHT SWEATS: ICD-10-CM

## 2023-09-28 PROCEDURE — 99214 OFFICE O/P EST MOD 30 MIN: CPT | Performed by: FAMILY MEDICINE

## 2023-09-28 RX ORDER — LORAZEPAM 1 MG/1
1 TABLET ORAL DAILY PRN
Qty: 30 TABLET | Refills: 0 | Status: SHIPPED | OUTPATIENT
Start: 2023-09-28 | End: 2023-10-04 | Stop reason: SDUPTHER

## 2023-09-28 NOTE — PROGRESS NOTES
Assessment/Plan: ER records reviewed. Patient go for further labs and continue with potassium replacement for hypokalemia. Patient may follow-up with Dr. Cande Segal in the future regarding omental infarct as well as following up with GI for history of colonic ischemia. .  Patient will continue with lorazepam for anxiety. Refills given at this time. Patient will have UA C&S done at this time. Diagnoses and all orders for this visit:    Colonic ischemia (HCC)    Anxiety  -     LORazepam (ATIVAN) 1 mg tablet; Take 1 tablet (1 mg total) by mouth daily as needed for anxiety    S/P small bowel resection    Omental infarction [facility])    Essential hypertension    Hypokalemia  -     Comprehensive metabolic panel; Future  -     Magnesium; Future    Night sweats  -     Metanephrine, Fractionated Plasma Free; Future            Subjective:        Patient ID: Norris Vera is a 70 y.o. female. Patient is here status post ER visit on September 8 for abdominal pain. Patient had CAT scan which showed omental infarct. Patient also here to follow-up on anxiety and needs refills on lorazepam.  Patient did have increased anxiety the other day. Patient had some shortness of breath associated with this. No significant chest pain. Patient to use the Librax with some improvement. Potassium 3.1. The following portions of the patient's history were reviewed and updated as appropriate: allergies, current medications, past family history, past medical history, past social history, past surgical history and problem list.      Review of Systems   Constitutional: Negative. HENT: Negative. Eyes: Negative. Respiratory: Negative. Cardiovascular: Negative. Gastrointestinal: Positive for abdominal distention and abdominal pain. Endocrine: Negative. Genitourinary: Negative. Musculoskeletal: Negative. Skin: Negative. Allergic/Immunologic: Negative. Neurological: Negative. Hematological: Negative. Psychiatric/Behavioral: Negative. Objective:               /86 (BP Location: Left arm, Patient Position: Sitting, Cuff Size: Standard)   Pulse 58   Temp 97.5 °F (36.4 °C) (Temporal)   Ht 5' 4" (1.626 m)   Wt 80.3 kg (177 lb) Comment: per patient  LMP  (LMP Unknown)   SpO2 98%   BMI 30.38 kg/m²          Physical Exam  Vitals and nursing note reviewed. Constitutional:       General: She is not in acute distress. Appearance: Normal appearance. She is not ill-appearing, toxic-appearing or diaphoretic. HENT:      Head: Normocephalic and atraumatic. Right Ear: Tympanic membrane, ear canal and external ear normal. There is no impacted cerumen. Left Ear: Tympanic membrane, ear canal and external ear normal. There is no impacted cerumen. Nose: Nose normal. No congestion or rhinorrhea. Mouth/Throat:      Mouth: Mucous membranes are moist.      Pharynx: No oropharyngeal exudate or posterior oropharyngeal erythema. Eyes:      General: No scleral icterus. Right eye: No discharge. Left eye: No discharge. Extraocular Movements: Extraocular movements intact. Conjunctiva/sclera: Conjunctivae normal.      Pupils: Pupils are equal, round, and reactive to light. Neck:      Vascular: No carotid bruit. Cardiovascular:      Rate and Rhythm: Normal rate and regular rhythm. Pulses: Normal pulses. Heart sounds: Normal heart sounds. No murmur heard. No friction rub. No gallop. Pulmonary:      Effort: Pulmonary effort is normal. No respiratory distress. Breath sounds: Normal breath sounds. No stridor. No wheezing, rhonchi or rales. Chest:      Chest wall: No tenderness. Abdominal:      General: Bowel sounds are normal. There is no distension. Tenderness: There is abdominal tenderness. There is no guarding or rebound. Musculoskeletal:         General: No swelling, tenderness, deformity or signs of injury. Normal range of motion. Cervical back: Normal range of motion and neck supple. No rigidity. No muscular tenderness. Right lower leg: No edema. Left lower leg: No edema. Lymphadenopathy:      Cervical: No cervical adenopathy. Skin:     General: Skin is warm and dry. Capillary Refill: Capillary refill takes less than 2 seconds. Coloration: Skin is not jaundiced. Findings: No bruising, erythema, lesion or rash. Neurological:      Mental Status: She is alert and oriented to person, place, and time. Mental status is at baseline. Cranial Nerves: No cranial nerve deficit. Sensory: No sensory deficit. Motor: No weakness. Coordination: Coordination normal.      Gait: Gait normal.   Psychiatric:         Mood and Affect: Mood normal.         Behavior: Behavior normal.         Thought Content:  Thought content normal.         Judgment: Judgment normal.

## 2023-09-29 ENCOUNTER — PATIENT OUTREACH (OUTPATIENT)
Dept: FAMILY MEDICINE CLINIC | Facility: CLINIC | Age: 63
End: 2023-09-29

## 2023-10-02 ENCOUNTER — OFFICE VISIT (OUTPATIENT)
Dept: FAMILY MEDICINE CLINIC | Facility: CLINIC | Age: 63
End: 2023-10-02
Payer: COMMERCIAL

## 2023-10-02 VITALS
OXYGEN SATURATION: 98 % | SYSTOLIC BLOOD PRESSURE: 122 MMHG | BODY MASS INDEX: 30.39 KG/M2 | HEIGHT: 64 IN | DIASTOLIC BLOOD PRESSURE: 78 MMHG | TEMPERATURE: 97.7 F | HEART RATE: 58 BPM | WEIGHT: 178 LBS

## 2023-10-02 DIAGNOSIS — Z00.00 MEDICARE ANNUAL WELLNESS VISIT, SUBSEQUENT: ICD-10-CM

## 2023-10-02 DIAGNOSIS — Z12.31 BREAST CANCER SCREENING BY MAMMOGRAM: ICD-10-CM

## 2023-10-02 DIAGNOSIS — K55.9 COLONIC ISCHEMIA (HCC): ICD-10-CM

## 2023-10-02 DIAGNOSIS — E53.8 B12 DEFICIENCY: ICD-10-CM

## 2023-10-02 DIAGNOSIS — Z13.820 SCREENING FOR OSTEOPOROSIS: Primary | ICD-10-CM

## 2023-10-02 PROCEDURE — 96372 THER/PROPH/DIAG INJ SC/IM: CPT | Performed by: FAMILY MEDICINE

## 2023-10-02 PROCEDURE — G0439 PPPS, SUBSEQ VISIT: HCPCS | Performed by: FAMILY MEDICINE

## 2023-10-02 RX ORDER — CYANOCOBALAMIN 1000 UG/ML
1000 INJECTION, SOLUTION INTRAMUSCULAR; SUBCUTANEOUS
Status: DISCONTINUED | OUTPATIENT
Start: 2023-10-02 | End: 2023-10-02

## 2023-10-02 RX ORDER — CYANOCOBALAMIN 1000 UG/ML
1000 INJECTION, SOLUTION INTRAMUSCULAR; SUBCUTANEOUS
Status: SHIPPED | OUTPATIENT
Start: 2023-10-02

## 2023-10-02 RX ADMIN — CYANOCOBALAMIN 1000 MCG: 1000 INJECTION, SOLUTION INTRAMUSCULAR; SUBCUTANEOUS at 10:40

## 2023-10-02 NOTE — PROGRESS NOTES
Assessment and Plan:     Problem List Items Addressed This Visit        Digestive    Colonic ischemia [facility])    Relevant Orders    Ambulatory Referral to Palliative Care   Other Visit Diagnoses     Screening for osteoporosis    -  Primary    Relevant Orders    DXA bone density spine hip and pelvis    Breast cancer screening by mammogram        Relevant Orders    Mammo screening bilateral w 3d & cad    B12 deficiency        Relevant Medications    cyanocobalamin injection 1,000 mcg    Medicare annual wellness visit, subsequent          Patient refusing all vaccines. Patient go for mammogram as well as DEXA scan. Vitamin B12 shot given at this time. Preventive health issues were discussed with patient, and age appropriate screening tests were ordered as noted in patient's After Visit Summary. Personalized health advice and appropriate referrals for health education or preventive services given if needed, as noted in patient's After Visit Summary.      History of Present Illness:     Patient presents for a Medicare Wellness Visit    HPI   Patient Care Team:  Zeeshan Cerda DO as PCP - General (Family Medicine)  Zeeshan Cerda, DO as PCP - PCP-[facility]  Zeeshan Cerda, DO as PCP - PCP-[employer] ([place])  Zeeshan Cerda, DO as PCP - PCP-[facility] ([facility])  MD Lindy Jiménez MD (Urology)  Kaleb Alarcon, RN as RN Care Manager (Care Coordination)     Review of Systems:     Review of Systems     Problem List:     Patient Active Problem List   Diagnosis   • Angina pectoris [facility])   • Essential hypertension   • Migraines   • AMD (age-related macular degeneration), bilateral   • Allergic reaction   • Gastroesophageal reflux disease without esophagitis   • Allergic conjunctivitis of both eyes   • Angio-edema   • Paroxysmal atrial fibrillation ([address])   • Lumbar radiculopathy   • CAMILLE (obstructive sleep apnea)   • Hypertensive heart disease with congestive heart failure ([address])   • Unspecified diastolic (congestive) heart failure (HCC)   • 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HCC)   • COPD (chronic obstructive pulmonary disease) (HCC)   • Urinary retention   • Peripheral vascular disease ([address])   • Appendix disease   • Carotid artery stenosis   • Stenosis of left carotid artery   • Asymptomatic stenosis of left carotid artery   • Hepatic steatosis   • Partial facial nerve palsy   • Injury of left facial nerve   • Colitis   • Chronic migraine w/o aura w/o status migrainosus, not intractable   • IIH (idiopathic intracranial hypertension)   • Hematochezia   • Left carotid stenosis   • Colonic ischemia (HCC)   • Acute postoperative pain   • S/P small bowel resection   • Anxiety   • Omental infarction ([address])   • Hypokalemia   • Night sweats      Past Medical and Surgical History:     Past Medical History:   Diagnosis Date   • A-fib ([address]) 03/2020   • Allergic    • Anxiety    • Arthritis    • Asthma    • Back pain     and muscle pain   • Bruises easily    • Chronic pain disorder     Interstitial pain   • Colon polyp    • Dental bridge present    • Depression    • Eczema    • GERD (gastroesophageal reflux disease)    • Heart murmur    • History of blood clots     per pt "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"   • History of pneumonia    • Hives    • Hyperlipidemia    • Hypertension    • Infertility, female    • Interstitial cystitis    • Migraine     sees neurologist   • Motion sickness    • Palpitations    • PONV (postoperative nausea and vomiting)    • Premature atrial contractions 11/9/2022   • Psychiatric disorder    • TIA (transient ischemic attack) 09/2022    per pt --"had MRI and 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"   • Urinary incontinence     wears Pads   • Venous insufficiency 8/1/2017   • Wears glasses      Past Surgical History:   Procedure Laterality Date   • COLONOSCOPY     • DILATION AND CURETTAGE OF UTERUS     • EGD     • EXPLORATORY LAPAROTOMY      for infertility   • EXPLORATORY LAPAROTOMY W/ BOWEL RESECTION N/A 7/19/2023    Procedure: LAPAROTOMY EXPLORATORY W/ BOWEL RESECTION;  Surgeon: Josh Montiel MD;  Location: [facility];  Service: General   • HAND SURGERY      Hand excision of tendon cyst   • MT LAPAROSCOPIC APPENDECTOMY N/A 05/03/2022    Procedure: APPENDECTOMY LAPAROSCOPIC;  Surgeon:  Stephanie Rosales MD;  [facility];  Service: General   • MT LAPS ABD PRTM&OMENTUM DX W/WO SPEC BR/WA SPX N/A 7/19/2023    Procedure: LAPAROSCOPY DIAGNOSTIC, LYSIS OF ADHESIONS, LAPAROSCOPY TAKE TAKEDOWN OF LEFT COLON, EXPLORATORY LAPAROSCOPY, LYSIS OF ADHESIONS, RESECTION OF TRANSVERSE COLON SPLENIC FLEXURE AND DESCENDING COLON , TAKE DOWN OF SPLENIC FLEXURE, SIGMOIDOSCOPY, MOBILIZATION OF RIGHT COLON, PRIMARY ANASTOMOSIS EEA 34, TAP BLOCK;  Surgeon: Josh Montiel MD;  [facility];  Service: General   • MT TEAEC W/PATCH GRF CAROTID VERTB [address] Left 1/31/2023    Procedure: EXPLORATION OF LEFT CAROTID ARTERY; ABORTED ENDARTERECTOMY ARTERY CAROTID;  Surgeon: Mirta London DO;  Location: [facility];  Service: Vascular   • SUPERIOR MESTENTERIC ARTERY STENT     • WISDOM TOOTH EXTRACTION        Family History:     Family History   Problem Relation Age of Onset   • Lung cancer Mother 61   • Brain cancer Mother 61   • Diabetes Father    • Depression Father    • Other Father         septic   • Allergies Sister    • Hashimoto's thyroiditis Sister    • Abdominal aortic aneurysm Sister    • Diabetes Sister    • No Known Problems Sister    • No Known Problems Maternal Grandmother    • No Known Problems Maternal Grandfather    • No Known Problems Paternal Grandmother    • No Known Problems Paternal Grandfather    • Hodgkin's lymphoma Brother    • No Known Problems Brother    • No Known Problems Maternal Aunt    • Diabetes Other       Social History:     Social History     Socioeconomic History   • Marital status:      Spouse name: None   • Number of children: 0   • Years of education: None   • Highest education level: None   Occupational History   • Occupation: unemployed   Tobacco Use   • Smoking status: Former     Packs/day: 0.50     Years: 10.00     Total pack years: 5.00     Types: Cigarettes     Quit date:      Years since quittin.7     Passive exposure: Never   • Smokeless tobacco: Never   Vaping Use   • Vaping Use: Never used   Substance and Sexual Activity   • Alcohol use: Not Currently   • Drug use: No   • Sexual activity: None     Comment: defer   Other Topics Concern   • None   Social History Narrative    Who lives in your home: Alone     What type of home do you live in: Apartment     Age of your home: 1952 built     How long have you been living there: 5 yrs     Type of heat: forced hot air     Type of fuel: electric     What type of jamin is in your bedroom: carpet jamin     Do you have the following in or near your home:    Air products: Humidifier in the bedroom/kitchen     Pests: none     Pets: none     Are pets allowed in bedroom: N/A     Open fields, wooded areas nearby: No     Basement: [id number] mold     Exposure to second hand smoke: No    Central air     Habits:    Caffeine: Hot tea 1 cup daily- ice tea 1 cup in the afternoon    Chocolate: Occasionally      Social Determinants of Health     Financial Resource Strain: Medium Risk (10/2/2023)    Overall Financial Resource Strain (CARDIA)    • Difficulty of Paying Living Expenses: Somewhat hard   Food Insecurity: No Food Insecurity (2023)    Hunger Vital Sign    • Worried About Running Out of Food in the Last Year: Never true    • Ran Out of Food in the Last Year: Never true   Transportation Needs: No Transportation Needs (10/2/2023)    PRAPARE - Transportation    • Lack of Transportation (Medical): No    • Lack of Transportation (Non-Medical):  No   Physical Activity: Inactive (2021)    Exercise Vital Sign    • Days of Exercise per Week: 0 days    • Minutes of Exercise per Session: 0 min   Stress: Not on file   Social Connections:  Moderately Isolated (5/24/2021)    Social Connection and Isolation Panel [NHANES]    • Frequency of Communication with Friends and Family: More than three times a week    • Frequency of Social Gatherings with Friends and Family: More than three times a week    • Attends Spiritism Services: 1 to 4 times per year    • Active Member of Clubs or Organizations: No    • Attends Club or Organization Meetings: Never    • Marital Status: Never    Intimate Partner Violence: Not At Risk (5/24/2021)    Humiliation, Afraid, Rape, and Kick questionnaire    • Fear of Current or Ex-Partner: No    • Emotionally Abused: No    • Physically Abused: No    • Sexually Abused: No   Housing Stability: [address]  (7/20/2023)    Housing Stability Vital Sign    • Unable to Pay for Housing in the Last Year: No    • Number of Places Lived in the Last Year: 1    • Unstable Housing in the Last Year: No      Medications and Allergies:     Current Outpatient Medications   Medication Sig Dispense Refill   • acetaminophen (TYLENOL) 325 mg tablet Take 2 tablets (650 mg total) by mouth every 6 (six) hours  0   • amiodarone 200 mg tablet TAKE ONE TABLET BY MOUTH ONCE DAILY 30 tablet 5   • aspirin 81 mg chewable tablet Chew 1 tablet (81 mg total) daily  0   • Cholecalciferol 25 MCG (1000 UT) tablet Take 1,000 Units by mouth daily     • dexamethasone (DECADRON) 4 mg tablet 1 tab PO with breakfast for 1 to 5 days for unrelenting migraine 5 tablet 0   • diltiazem (CARDIZEM CD) 120 mg 24 hr capsule TAKE ONE CAPSULE BY MOUTH ONCE DAILY 30 capsule 3   • Docusate Sodium (COLACE PO) Take by mouth daily at bedtime     • Eliquis 5 MG TAKE ONE TABLET BY MOUTH TWICE DAILY 180 tablet 0   • famotidine (PEPCID) 20 mg tablet Take 20 mg by mouth daily at bedtime     • fexofenadine (ALLEGRA) 180 MG tablet Take 180 mg by mouth daily     • fluticasone-vilanterol (BREO ELLIPTA) 200-25 MCG/INH inhaler Inhale 1 puff daily Rinse mouth after use. 3 Inhaler 3   • furosemide (LASIX) 40 mg tablet Take 1 tablet (40 mg total) by mouth daily 90 tablet 0   • HYDROcodone-acetaminophen (Norco) 5-325 mg per tablet Take 1 tablet by mouth every 6 (six) hours as needed for pain Max Daily Amount: 4 tablets 10 tablet 0   • hydrOXYzine HCL (ATARAX) 25 mg tablet TAKE TWO TABLETS BY MOUTH DAILY AT BEDTIME 90 tablet 0   • LORazepam (ATIVAN) 1 mg tablet Take 1 tablet (1 mg total) by mouth daily as needed for anxiety 30 tablet 0   • magnesium Oxide (MAG-OX) 400 mg TABS Take 1 tablet (400 mg total) by mouth daily at bedtime 90 tablet 3   • methocarbamol (ROBAXIN) 500 mg tablet Take 1 tablet (500 mg total) by mouth 3 (three) times a day 90 tablet 2   • metoprolol succinate (TOPROL-XL) 100 mg 24 hr tablet Take 1 tablet (100 mg total) by mouth 2 (two) times a day 180 tablet 0   • montelukast (SINGULAIR) 10 mg tablet TAKE ONE TABLET BY MOUTH DAILY AT BEDTIME 90 tablet 0   • MULTIPLE VITAMIN PO Take by mouth     • naloxone (NARCAN) 4 mg/0.1 mL nasal spray Administer 1 spray into a nostril. If no response after 2-3 minutes, give another dose in the other nostril using a new spray. 1 each 1   • omeprazole (PriLOSEC) 40 MG capsule Take 1 capsule (40 mg total) by mouth daily 90 capsule 3   • phenazopyridine (PYRIDIUM) 200 mg tablet Take 1 tablet (200 mg total) by mouth if needed for bladder spasms 30 tablet 3   • promethazine (PHENERGAN) 25 mg tablet Take 1 tablet (25 mg total) by mouth every 6 (six) hours as needed for nausea or vomiting 60 tablet 5   • Repatha SureClick 691 MG/ML SOAJ Inject 140mg under the skin every 2 weeks.  2 mL 11   • spironolactone (ALDACTONE) 25 mg tablet TAKE ONE TABLET BY MOUTH TWICE DAILY 180 tablet 0   • sucralfate (CARAFATE) 1 g tablet Take 1 tablet (1 g total) by mouth 2 (two) times a day before meals 60 tablet 0   • traMADol (ULTRAM) 50 mg tablet TAKE TWO TABLETS BY MOUTH EVERY EIGHT HOURS AS NEEDED for severe pain 180 tablet 0   • Ascorbic Acid (vitamin C) 1000 MG tablet Take 1 tablet (1,000 mg total) by mouth daily 30 tablet 0     Current Facility-Administered Medications   Medication Dose Route Frequency Provider Last Rate Last Admin   • cyanocobalamin injection 1,000 mcg  1,000 mcg Intramuscular Q30 Days Cynda Schaumann, DO   1,000 mcg at 07/07/23 1052   • cyanocobalamin injection 1,000 mcg  1,000 mcg Intramuscular Q30 Days Cynda Schaumann, DO         Allergies   Allergen Reactions   • Ace Inhibitors Angioedema and Anaphylaxis     Anaphylaxis   • Benicar [Olmesartan] Angioedema     See Allergy note from 9/11/2008. Swollen ankles/legs   • Hydralazine Other (See Comments)     Lip swelling  Flank pain   • Other Anaphylaxis and Other (See Comments)     Other reaction(s): angioadema  Other reaction(s): Other (See Comments)  Preservatives- itching throat closes, hi  Other reaction(s): angioadema  E Z Cat - hives throat closes itching,  Preservatives- itching throat closes, hi  Artificial sweeteners   • Valsartan Angioedema     Lips, face swollen   • Sulfa Antibiotics Hives     stuffiness,itching,hives,throat closing--per pt "sometimes able to take depending on the brand and the filler or possibly preservatives in it"   • Ampicillin Hives     Depends on brand some preservatives can react. Has recently tolerated oral amoxicillin.    • Motrin [Ibuprofen] Other (See Comments)     Per pt " told not to take due to A Fib"   • Wound Dressing Adhesive Other (See Comments)     Per pt Adhesive on EKG leads caused redness      Immunizations:     Immunization History   Administered Date(s) Administered   • Tdap 06/05/2008      Health Maintenance:         Topic Date Due   • HIV Screening  Never done   • Cervical Cancer Screening  10/22/2023   • Breast Cancer Screening: Mammogram  08/15/2024   • Colorectal Cancer Screening  07/10/2032   • Hepatitis C Screening  Completed         Topic Date Due   • COVID-19 Vaccine (1) Never done   • Pneumococcal Vaccine: 65+ Years (1 - PCV) Never done   • Influenza Vaccine (1) Never done      Medicare Screening Tests and Risk Assessments:     Haley Bang is here for her Subsequent Wellness visit. Health Risk Assessment:   Patient rates overall health as good. Patient feels that their physical health rating is slightly worse. Patient is satisfied with their life. Eyesight was rated as slightly worse. Hearing was rated as same. Patient feels that their emotional and mental health rating is same. Patients states they are never, rarely angry. Patient states they are sometimes unusually tired/fatigued. Pain experienced in the last 7 days has been some. Patient's pain rating has been 7/10. Patient states that she has experienced weight loss or gain in last 6 months. Depression Screening:   PHQ-2 Score: 0      Fall Risk Screening: In the past year, patient has experienced: no history of falling in past year      Urinary Incontinence Screening:   Patient has leaked urine accidently in the last six months. sometimes    Home Safety:  Patient does not have trouble with stairs inside or outside of their home. Patient has working smoke alarms and has working carbon monoxide detector. Home safety hazards include: none. Nutrition:   Current diet is No Added Salt and Other (please comment). No dairy, healthy diet    Medications:   Patient is currently taking over-the-counter supplements. OTC medications include: see medication list. Patient is able to manage medications. Activities of Daily Living (ADLs)/Instrumental Activities of Daily Living (IADLs):   Walk and transfer into and out of bed and chair?: Yes  Dress and groom yourself?: Yes    Bathe or shower yourself?: Yes    Feed yourself?  Yes  Do your laundry/housekeeping?: Yes  Manage your money, pay your bills and track your expenses?: Yes  Make your own meals?: Yes    Do your own shopping?: Yes    Previous Hospitalizations:   Any hospitalizations or ED visits within the last 12 months?: Yes    How many hospitalizations have you had in the last year?: 3-4    Advance Care Planning:   Living will: No    Durable POA for healthcare: No    Advanced directive: No      Cognitive Screening:   Provider or family/friend/caregiver concerned regarding cognition?: No    PREVENTIVE SCREENINGS      Cardiovascular Screening:    General: Screening Not Indicated, History Lipid Disorder, Risks and Benefits Discussed and Screening Current      Diabetes Screening:     General: Screening Current and Risks and Benefits Discussed      Colorectal Cancer Screening:     General: Screening Current and Risks and Benefits Discussed      Breast Cancer Screening:     General: Screening Current and Risks and Benefits Discussed      Cervical Cancer Screening:    General: Screening Not Indicated and Risks and Benefits Discussed      Osteoporosis Screening:    General: Risks and Benefits Discussed    Due for: DXA Axial      Abdominal Aortic Aneurysm (AAA) Screening:    Risk factors include: family history of AAA        General: Screening Not Indicated      Lung Cancer Screening:     General: Screening Not Indicated and Risks and Benefits Discussed      Hepatitis C Screening:    General: Screening Current and Risks and Benefits Discussed    Review of Current Opioid Use    Opioid Risk Tool (ORT) Interpretation: Complete Opioid Risk Tool (ORT)    Other Counseling Topics:   Regular weightbearing exercise. No results found.      Physical Exam:     /78 (BP Location: Right arm, Patient Position: Sitting, Cuff Size: Standard)   Pulse 58   Temp 97.7 °F (36.5 °C) (Tympanic)   Ht 5' 4" (1.626 m)   Wt 80.7 kg (178 lb)   LMP  (LMP Unknown)   SpO2 98%   BMI 30.55 kg/m²     Physical Exam     [place] [employer], DO

## 2023-10-02 NOTE — PATIENT INSTRUCTIONS
Medicare Preventive Visit Patient Instructions  Thank you for completing your Welcome to Medicare Visit or Medicare Annual Wellness Visit today. Your next wellness visit will be due in one year (10/2/2024). The screening/preventive services that you may require over the next 5-10 years are detailed below. Some tests may not apply to you based off risk factors and/or age. Screening tests ordered at today's visit but not completed yet may show as past due. Also, please note that scanned in results may not display below. Preventive Screenings:  Service Recommendations Previous Testing/Comments   Colorectal Cancer Screening  * Colonoscopy    * Fecal Occult Blood Test (FOBT)/Fecal Immunochemical Test (FIT)  * Fecal DNA/Cologuard Test  * Flexible Sigmoidoscopy Age: 43-73 years old   Colonoscopy: every 10 years (may be performed more frequently if at higher risk)  OR  FOBT/FIT: every 1 year  OR  Cologuard: every 3 years  OR  Sigmoidoscopy: every 5 years  Screening may be recommended earlier than age 39 if at higher risk for colorectal cancer. Also, an individualized decision between you and your healthcare provider will decide whether screening between the ages of 77-80 would be appropriate. Colonoscopy: 07/13/2022  FOBT/FIT: Not on file  Cologuard: Not on file  Sigmoidoscopy: 07/19/2023    Screening Current     Breast Cancer Screening Age: 36 years old  Frequency: every 1-2 years  Not required if history of left and right mastectomy Mammogram: 08/15/2022    Screening Current   Cervical Cancer Screening Between the ages of 21-29, pap smear recommended once every 3 years. Between the ages of 32-69, can perform pap smear with HPV co-testing every 5 years.    Recommendations may differ for women with a history of total hysterectomy, cervical cancer, or abnormal pap smears in past. Pap Smear: 10/22/2020    Screening Not Indicated   Hepatitis C Screening Once for adults born between 1945 and 1965  More frequently in patients at high risk for Hepatitis C Hep C Antibody: 05/14/2019    Screening Current   Diabetes Screening 1-2 times per year if you're at risk for diabetes or have pre-diabetes Fasting glucose: 88 mg/dL (5/23/2023)  A1C: 5.5 % (5/23/2023)  Screening Current   Cholesterol Screening Once every 5 years if you don't have a lipid disorder. May order more often based on risk factors. Lipid panel: 05/23/2023    Screening Not Indicated  History Lipid Disorder     Other Preventive Screenings Covered by Medicare:  1. Abdominal Aortic Aneurysm (AAA) Screening: covered once if your at risk. You're considered to be at risk if you have a family history of AAA. 2. Lung Cancer Screening: covers low dose CT scan once per year if you meet all of the following conditions: (1) Age 48-67; (2) No signs or symptoms of lung cancer; (3) Current smoker or have quit smoking within the last 15 years; (4) You have a tobacco smoking history of at least 20 pack years (packs per day multiplied by number of years you smoked); (5) You get a written order from a healthcare provider. 3. Glaucoma Screening: covered annually if you're considered high risk: (1) You have diabetes OR (2) Family history of glaucoma OR (3)  aged 48 and older OR (3)  American aged 72 and older  3. Osteoporosis Screening: covered every 2 years if you meet one of the following conditions: (1) You're estrogen deficient and at risk for osteoporosis based off medical history and other findings; (2) Have a vertebral abnormality; (3) On glucocorticoid therapy for more than 3 months; (4) Have primary hyperparathyroidism; (5) On osteoporosis medications and need to assess response to drug therapy. · Last bone density test (DXA Scan): Not on file. 5. HIV Screening: covered annually if you're between the age of 14-79. Also covered annually if you are younger than 13 and older than 72 with risk factors for HIV infection.  For pregnant patients, it is covered up to 3 times per pregnancy. Immunizations:  Immunization Recommendations   Influenza Vaccine Annual influenza vaccination during flu season is recommended for all persons aged >= 6 months who do not have contraindications   Pneumococcal Vaccine   * Pneumococcal conjugate vaccine = PCV13 (Prevnar 13), PCV15 (Vaxneuvance), PCV20 (Prevnar 20)  * Pneumococcal polysaccharide vaccine = PPSV23 (Pneumovax) Adults 20-63 years old: 1-3 doses may be recommended based on certain risk factors  Adults 72 years old: 1-2 doses may be recommended based off what pneumonia vaccine you previously received   Hepatitis B Vaccine 3 dose series if at intermediate or high risk (ex: diabetes, end stage renal disease, liver disease)   Tetanus (Td) Vaccine - COST NOT COVERED BY MEDICARE PART B Following completion of primary series, a booster dose should be given every 10 years to maintain immunity against tetanus. Td may also be given as tetanus wound prophylaxis. Tdap Vaccine - COST NOT COVERED BY MEDICARE PART B Recommended at least once for all adults. For pregnant patients, recommended with each pregnancy. Shingles Vaccine (Shingrix) - COST NOT COVERED BY MEDICARE PART B  2 shot series recommended in those aged 48 and above     Health Maintenance Due:      Topic Date Due   • HIV Screening  Never done   • Cervical Cancer Screening  10/22/2023   • Breast Cancer Screening: Mammogram  08/15/2024   • Colorectal Cancer Screening  07/10/2032   • Hepatitis C Screening  Completed     Immunizations Due:      Topic Date Due   • COVID-19 Vaccine (1) Never done   • Pneumococcal Vaccine: 65+ Years (1 - PCV) Never done   • Influenza Vaccine (1) Never done     Advance Directives   What are advance directives? Advance directives are legal documents that state your wishes and plans for medical care. These plans are made ahead of time in case you lose your ability to make decisions for yourself.  Advance directives can apply to any medical decision, such as the treatments you want, and if you want to donate organs. What are the types of advance directives? There are many types of advance directives, and each state has rules about how to use them. You may choose a combination of any of the following:  · Living will: This is a written record of the treatment you want. You can also choose which treatments you do not want, which to limit, and which to stop at a certain time. This includes surgery, medicine, IV fluid, and tube feedings. · Durable power of  for healthcare [facility]): This is a written record that states who you want to make healthcare choices for you when you are unable to make them for yourself. This person, called a proxy, is usually a family member or a friend. You may choose more than 1 proxy. · Do not resuscitate (DNR) order:  A DNR order is used in case your heart stops beating or you stop breathing. It is a request not to have certain forms of treatment, such as CPR. A DNR order may be included in other types of advance directives. · Medical directive: This covers the care that you want if you are in a coma, near death, or unable to make decisions for yourself. You can list the treatments you want for each condition. Treatment may include pain medicine, surgery, blood transfusions, dialysis, IV or tube feedings, and a ventilator (breathing machine). · Values history: This document has questions about your views, beliefs, and how you feel and think about life. This information can help others choose the care that you would choose. Why are advance directives important? An advance directive helps you control your care. Although spoken wishes may be used, it is better to have your wishes written down. Spoken wishes can be misunderstood, or not followed. Treatments may be given even if you do not want them. An advance directive may make it easier for your family to make difficult choices about your care.    Urinary Incontinence   Urinary incontinence (UI)  is when you lose control of your bladder. UI develops because your bladder cannot store or empty urine properly. The 3 most common types of UI are stress incontinence, urge incontinence, or both. Medicines:   · May be given to help strengthen your bladder control. Report any side effects of medication to your healthcare provider. Do pelvic muscle exercises often:  Your pelvic muscles help you stop urinating. Squeeze these muscles tight for 5 seconds, then relax for 5 seconds. Gradually work up to squeezing for 10 seconds. Do 3 sets of 15 repetitions a day, or as directed. This will help strengthen your pelvic muscles and improve bladder control. Train your bladder:  Go to the bathroom at set times, such as every 2 hours, even if you do not feel the urge to go. You can also try to hold your urine when you feel the urge to go. For example, hold your urine for 5 minutes when you feel the urge to go. As that becomes easier, hold your urine for 10 minutes. Self-care:   · Keep a UI record. Write down how often you leak urine and how much you leak. Make a note of what you were doing when you leaked urine. · Drink liquids as directed. You may need to limit the amount of liquid you drink to help control your urine leakage. Do not drink any liquid right before you go to bed. Limit or do not have drinks that contain caffeine or alcohol. · Prevent constipation. Eat a variety of high-fiber foods. Good examples are high-fiber cereals, beans, vegetables, and whole-grain breads. Walking is the best way to trigger your intestines to have a bowel movement. · Exercise regularly and maintain a healthy weight. Weight loss and exercise will decrease pressure on your bladder and help you control your leakage. · Use a catheter as directed  to help empty your bladder. A catheter is a tiny, plastic tube that is put into your bladder to drain your urine.    · Go to behavior therapy as directed. Behavior therapy may be used to help you learn to control your urge to urinate. Weight Management   Why it is important to manage your weight:  Being overweight increases your risk of health conditions such as heart disease, high blood pressure, type 2 diabetes, and certain types of cancer. It can also increase your risk for osteoarthritis, sleep apnea, and other respiratory problems. Aim for a slow, steady weight loss. Even a small amount of weight loss can lower your risk of health problems. How to lose weight safely:  A safe and healthy way to lose weight is to eat fewer calories and get regular exercise. You can lose up about 1 pound a week by decreasing the number of calories you eat by 500 calories each day. Healthy meal plan for weight management:  A healthy meal plan includes a variety of foods, contains fewer calories, and helps you stay healthy. A healthy meal plan includes the following:  · Eat whole-grain foods more often. A healthy meal plan should contain fiber. Fiber is the part of grains, fruits, and vegetables that is not broken down by your body. Whole-grain foods are healthy and provide extra fiber in your diet. Some examples of whole-grain foods are whole-wheat breads and pastas, oatmeal, brown rice, and bulgur. · Eat a variety of vegetables every day. Include dark, leafy greens such as spinach, kale, all greens, and mustard greens. Eat yellow and orange vegetables such as carrots, sweet potatoes, and winter squash. · Eat a variety of fruits every day. Choose fresh or canned fruit (canned in its own juice or light syrup) instead of juice. Fruit juice has very little or no fiber. · Eat low-fat dairy foods. Drink fat-free (skim) milk or 1% milk. Eat fat-free yogurt and low-fat cottage cheese. Try low-fat cheeses such as mozzarella and other reduced-fat cheeses. · Choose meat and other protein foods that are low in fat.   Choose beans or other legumes such as split peas or lentils. Choose fish, skinless poultry (chicken or turkey), or lean cuts of red meat (beef or pork). Before you cook meat or poultry, cut off any visible fat. · Use less fat and oil. Try baking foods instead of frying them. Add less fat, such as margarine, sour cream, regular salad dressing and mayonnaise to foods. Eat fewer high-fat foods. Some examples of high-fat foods include french fries, doughnuts, ice cream, and cakes. · Eat fewer sweets. Limit foods and drinks that are high in sugar. This includes candy, cookies, regular soda, and sweetened drinks. Exercise:  Exercise at least 30 minutes per day on most days of the week. Some examples of exercise include walking, biking, dancing, and swimming. You can also fit in more physical activity by taking the stairs instead of the elevator or parking farther away from stores. Ask your healthcare provider about the best exercise plan for you. Narcotic (Opioid) Safety    Use narcotics safely:  · Take prescribed narcotics exactly as directed  · Do not give narcotics to others or take narcotics that belong to someone else  · Do not mix narcotics without medicines or alcohol  · Do not drive or operate heavy machinery after you take the narcotic  · Monitor for side effects and notify your healthcare provider if you experienced side effects such as nausea, sleepiness, itching, or trouble thinking clearly. Manage constipation:    Constipation is the most common side effect of narcotic medicine. Constipation is when you have hard, dry bowel movements, or you go longer than usual between bowel movements. Tell your healthcare provider about all changes in your bowel movements while you are taking narcotics. He or she may recommend laxative medicine to help you have a bowel movement. He or she may also change the kind of narcotic you are taking, or change when you take it.  The following are more ways you can prevent or relieve constipation:    · Drink liquids as directed. You may need to drink extra liquids to help soften and move your bowels. Ask how much liquid to drink each day and which liquids are best for you. · Eat high-fiber foods. This may help decrease constipation by adding bulk to your bowel movements. High-fiber foods include fruits, vegetables, whole-grain breads and cereals, and beans. Your healthcare provider or dietitian can help you create a high-fiber meal plan. Your provider may also recommend a fiber supplement if you cannot get enough fiber from food. · Exercise regularly. Regular physical activity can help stimulate your intestines. Walking is a good exercise to prevent or relieve constipation. Ask which exercises are best for you. · Schedule a time each day to have a bowel movement. This may help train your body to have regular bowel movements. Bend forward while you are on the toilet to help move the bowel movement out. Sit on the toilet for at least 10 minutes, even if you do not have a bowel movement. Store narcotics safely:   · Store narcotics where others cannot easily get them. Keep them in a locked cabinet or secure area. Do not  keep them in a purse or other bag you carry with you. A person may be looking for something else and find the narcotics. · Make sure narcotics are stored out of the reach of children. A child can easily overdose on narcotics. Narcotics may look like candy to a small child. The best way to dispose of narcotics: The laws vary by country and area. In the [facility], the best way is to return the narcotics through a take-back program. This program is offered by the [employer] [facility]). The following are options for using the program:  · Take the narcotics to a CHARLINE collection site. The site is often a law enforcement center. Call your local law enforcement center for scheduled take-back days in your area.  You will be given information on where to go if the collection site is in a different location. · Take the narcotics to an approved pharmacy or hospital.  A pharmacy or hospital may be set up as a collection site. You will need to ask if it is a CHARLINE collection site if you were not directed there. A pharmacy or doctor's office may not be able to take back narcotics unless it is a CHARLINE site. · Use a mail-back system. This means you are given containers to put the narcotics into. You will then mail them in the containers. · Use a take-back drop box. This is a place to leave the narcotics at any time. People and animals will not be able to get into the box. Your local law enforcement agency can tell you where to find a drop box in your area. Other ways to manage pain:   · Ask your healthcare provider about non-narcotic medicines to control pain. Nonprescription medicines include NSAIDs (such as ibuprofen) and acetaminophen. Prescription medicines include muscle relaxers, antidepressants, and steroids. · Pain may be managed without any medicines. Some ways to relieve pain include massage, aromatherapy, or meditation. Physical or occupational therapy may also help. For more information:   · Drug Enforcement Administration  [address] [facility] , [address] [facility]  Phone: 6- 374 - [phone]  Web Address: [facility].. [place].gov/drug_disposal/    · [address] [facility] [address] and Drug Administration  [address] [facility] , [address] [place] [address]  Phone: 9- 002 - [phone]  Web Address: http://[employer]/     © Copyright [employer] 2018 Information is for End User's use only and may not be sold, redistributed or otherwise used for commercial purposes.  All illustrations and images included in CareNotes® are the copyrighted property of [employer] or  [employer]

## 2023-10-03 ENCOUNTER — OFFICE VISIT (OUTPATIENT)
Dept: CARDIOLOGY CLINIC | Facility: CLINIC | Age: 63
End: 2023-10-03
Payer: COMMERCIAL

## 2023-10-03 VITALS
WEIGHT: 178 LBS | DIASTOLIC BLOOD PRESSURE: 82 MMHG | HEART RATE: 59 BPM | SYSTOLIC BLOOD PRESSURE: 110 MMHG | BODY MASS INDEX: 30.39 KG/M2 | HEIGHT: 64 IN

## 2023-10-03 DIAGNOSIS — I10 PRIMARY HYPERTENSION: ICD-10-CM

## 2023-10-03 DIAGNOSIS — I48.0 PAROXYSMAL ATRIAL FIBRILLATION (HCC): Primary | ICD-10-CM

## 2023-10-03 PROCEDURE — 99214 OFFICE O/P EST MOD 30 MIN: CPT | Performed by: INTERNAL MEDICINE

## 2023-10-03 PROCEDURE — 93000 ELECTROCARDIOGRAM COMPLETE: CPT | Performed by: INTERNAL MEDICINE

## 2023-10-03 NOTE — PROGRESS NOTES
Cardiology             Danitza May  1960  [id number]              Assessment/Plan:     Paroxysmal atrial fibrillation, diagnosed on Holter monitor 03/2020, with recurrence during episode of GI bleed 7/2023, now on amiodarone suppression, maintained on Eliquis anticoagulation  Hypertension  Multiple drug intolerances and possible allergies  Probable heterozygous familial hypercholesterolemia, on Repatha  Mild to moderate aortic regurgitation  Obesity   Sleep apnea, compliant with CPAP therapy   Lower extremity edema, on furosemide, resolved              Sinus bradycardia at 59 bpm on ECG today. Continue amiodarone. Continue metoprolol succinate 100 mg p.o. twice daily. Patient states atrial fibrillation feels very well controlled at home. We will check CMP and TSH. Patient sees pulmonary medicine at [facility]. Encouraged her to follow-up to screen for amiodarone lung toxicity/PFTs/diffusion capacity. Continue diltiazem, Eliquis. Continue furosemide 40 mg daily. Euvolemic by examination.   Continue spironolactone for hypertension control  We will plan to repeat echocardiogram in 1 to 2 years to reevaluate aortic valve regurgitation, mild to moderate on prior echocardiogram      Most recent echocardiogram 2/21/2023 with normal left ejection fraction, mild to moderate aortic regurgitation     Most recent nuclear stress test 1/17/2023 with no evidence of myocardial ischemia, no perfusion defects, ejection fraction 71%               Follow-up with me in 6 months              Interval History:      This is a 56-year-old female diagnosed with paroxysmal atrial fibrillation on Holter monitoring 3/020.  That time echocardiogram had revealed mild-to-moderate aortic regurgitation with normal left ventricular systolic function.  Nuclear stress test June 2020 was unremarkable with normal perfusion.     She has hypertension, and has multiple medications listed as allergies including beta-blockers, although she has been taking metoprolol for quite some time.  Although on her allergy list HCTZ states “throat closing," she states that actually cause some ankle swelling along with amlodipine.  Atorvastatin caused joint pain, although it is listed in her allergies also as “throat closing. ”  She seems to have had a true allergic reaction to hydralazine and valsartan.  She states valsartan caused angioedema, and she does not remember what happened with hydralazine, although states she thinks it may have been ankle swelling.     She was last seen by Dr. Gilbert Funk 01/28/2021 at which time she was maintained on metoprolol succinate and spironolactone for hypertension.  At 1 point she was asked to take diltiazem as needed for palpitations. Sheri Sharif has been maintained on Eliquis anticoagulation.       She underwent a Zio monitor 03/2021 at [facility](should be scanned into media section, personally reviewed rhythm strips) revealing normal sinus rhythm with 8 runs of SVT, longest lasting 17 beats.  There were 18 patient triggers, most of which correlated with PACs, short atrial runs, and PVCs. Merlynn Binder was no evidence of atrial fibrillation.     Nuclear stress test 06/2020 was unremarkable     In August 2021 she was hospitalized at [facility] for epigastric and left upper quadrant pain, admitted for superior mesenteric artery thrombosis.  She received heparin drip, and underwent SMA stenting with IR on 08/19/2021.  She was initiated on Plavix, and continued on Eliquis.     During her prior visit 10/15/2021 she was initiated on diltiazem which she later stopped due to headache and nausea.  Subsequent nifedipine was tried which caused headache and nausea as well which she stopped on her own.  Verapamil caused increase in palpitations.     She went to [facility] ER due to urinary retention and was reported to be in atrial fibrillation.  ZIO monitor 10/2022 revealed episodes of paroxysmal atrial fibrillation which correlated with her triggered symptoms.  Echocardiogram 12/16/2022 demonstrated normal left ejection fraction of 55% with mild mitral and aortic regurgitation. Nuclear stress test 1/17/2023 demonstrated no evidence of myocardial ischemia. She was seen by electrophysiology and was initiated on flecainide which she did not tolerate well due to side effects.  She refused ablation, therefore was placed on diltiazem.     On 1/31/2023 she underwent exploration of left carotid artery for CEA, and this was aborted, as the left carotid bifurcation was high and deep in the neck with the hypoglossal nerve overriding the bifurcation.  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 Echocardiogram 2/21/2023 demonstrated left ejection fraction 60% with mild right ventricular dilatation and mild to moderate aortic regurgitation.     During her prior visit 3/20/2023, diltiazem was increased from 120 mg to 180 mg daily in light of recurrent atrial fibrillation with RVR at 111 bpm.  She refused another consultation with electrophysiology. Alo Reese was unable to tolerate flecainide due to increased palpitations, therefore was reluctant to try any more medications and refused ablation as well.     She was started on Diamox thereafter by neurology for signs of increased intracranial pressure on MRI.                 Vitals:  Vitals:    10/03/23 1044   BP: 110/82   Pulse: 59   Weight: 80.7 kg (178 lb)   Height: 5' 4" (1.626 m)         Past Medical History:   Diagnosis Date   • A-fib ([address]) 03/2020   • Allergic    • Anxiety    • Arthritis    • Asthma    • Back pain     and muscle pain   • Bruises easily    • Chronic pain disorder     Interstitial pain   • Colon polyp    • Dental bridge present    • Depression    • Eczema    • GERD (gastroesophageal reflux disease)    • Heart murmur    • History of blood clots     per pt "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"   • History of pneumonia    • Hives    • Hyperlipidemia    • Hypertension    • Infertility, female    • Interstitial cystitis    • Migraine     sees neurologist   • Motion sickness    • Palpitations    • PONV (postoperative nausea and vomiting)    • Premature atrial contractions 2022   • Psychiatric disorder    • TIA (transient ischemic attack) 2022    per pt --"had MRI and 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"   • Urinary incontinence     wears Pads   • Venous insufficiency 2017   • Wears glasses      Social History     Socioeconomic History   • Marital status:      Spouse name: Not on file   • Number of children: 0   • Years of education: Not on file   • Highest education level: Not on file   Occupational History   • Occupation: unemployed   Tobacco Use   • Smoking status: Former     Packs/day: 0.50     Years: 10.00     Total pack years: 5.00     Types: Cigarettes     Quit date:      Years since quittin.7     Passive exposure: Never   • Smokeless tobacco: Never   Vaping Use   • Vaping Use: Never used   Substance and Sexual Activity   • Alcohol use: Not Currently   • Drug use: No   • Sexual activity: Not on file     Comment: defer   Other Topics Concern   • Not on file   Social History Narrative    Who lives in your home: Alone     What type of home do you live in: Apartment     Age of your home: 1952 built     How long have you been living there: 5 yrs     Type of heat: forced hot air     Type of fuel: electric     What type of jamin is in your bedroom: carpet jamin     Do you have the following in or near your home:    Air products: Humidifier in the bedroom/kitchen     Pests: none     Pets: none     Are pets allowed in bedroom: N/A     Open fields, wooded areas nearby: No     Basement: [id number]-no mold     Exposure to second hand smoke: No    Central air     Habits:    Caffeine: Hot tea 1 cup daily- ice tea 1 cup in the afternoon Chocolate: Occasionally      Social Determinants of Health     Financial Resource Strain: Medium Risk (10/2/2023)    Overall Financial Resource Strain (CARDIA)    • Difficulty of Paying Living Expenses: Somewhat hard   Food Insecurity: No Food Insecurity (2/21/2023)    Hunger Vital Sign    • Worried About Running Out of Food in the Last Year: Never true    • Ran Out of Food in the Last Year: Never true   Transportation Needs: No Transportation Needs (10/2/2023)    PRAPARE - Transportation    • Lack of Transportation (Medical): No    • Lack of Transportation (Non-Medical): No   Physical Activity: Inactive (5/24/2021)    Exercise Vital Sign    • Days of Exercise per Week: 0 days    • Minutes of Exercise per Session: 0 min   Stress: Not on file   Social Connections:  Moderately Isolated (5/24/2021)    Social Connection and Isolation Panel [NHANES]    • Frequency of Communication with Friends and Family: More than three times a week    • Frequency of Social Gatherings with Friends and Family: More than three times a week    • Attends Judaism Services: 1 to 4 times per year    • Active Member of Clubs or Organizations: No    • Attends Club or Organization Meetings: Never    • Marital Status: Never    Intimate Partner Violence: Not At Risk (5/24/2021)    Humiliation, Afraid, Rape, and Kick questionnaire    • Fear of Current or Ex-Partner: No    • Emotionally Abused: No    • Physically Abused: No    • Sexually Abused: No   Housing Stability: Low Risk  (7/20/2023)    Housing Stability Vital Sign    • Unable to Pay for Housing in the Last Year: No    • Number of Places Lived in the Last Year: 1    • Unstable Housing in the Last Year: No      Family History   Problem Relation Age of Onset   • Lung cancer Mother 61   • Brain cancer Mother 61   • Diabetes Father    • Depression Father    • Other Father         septic   • Allergies Sister    • Hashimoto's thyroiditis Sister    • Abdominal aortic aneurysm Sister    • Diabetes Sister    • No Known Problems Sister    • No Known Problems Maternal Grandmother    • No Known Problems Maternal Grandfather    • No Known Problems Paternal Grandmother    • No Known Problems Paternal Grandfather    • Hodgkin's lymphoma Brother    • No Known Problems Brother    • No Known Problems Maternal Aunt    • Diabetes Other      Past Surgical History:   Procedure Laterality Date   • COLONOSCOPY     • DILATION AND CURETTAGE OF UTERUS     • EGD     • EXPLORATORY LAPAROTOMY      for infertility   • EXPLORATORY LAPAROTOMY W/ BOWEL RESECTION N/A 7/19/2023    Procedure: LAPAROTOMY EXPLORATORY W/ BOWEL RESECTION;  Surgeon: Kaden Upton MD;  Location: [facility];  Service: General   • HAND SURGERY      Hand excision of tendon cyst   • PA LAPAROSCOPIC APPENDECTOMY N/A 05/03/2022    Procedure: APPENDECTOMY LAPAROSCOPIC;  Surgeon:  Kaity Pompa MD;  Location: [facility];  Service: General   • PA LAPS ABD PRTM&OMENTUM DX W/WO SPEC BR/WA SPX N/A 7/19/2023    Procedure: LAPAROSCOPY DIAGNOSTIC, LYSIS OF ADHESIONS, LAPAROSCOPY TAKE TAKEDOWN OF LEFT COLON, EXPLORATORY LAPAROSCOPY, LYSIS OF ADHESIONS, RESECTION OF TRANSVERSE COLON SPLENIC FLEXURE AND DESCENDING COLON , TAKE DOWN OF SPLENIC FLEXURE, SIGMOIDOSCOPY, MOBILIZATION OF RIGHT COLON, PRIMARY ANASTOMOSIS EEA 29, TAP BLOCK;  Surgeon: Kaden Upton MD;  Location: [facility];  Service: General   • PA TEAEC W/PATCH GRF CAROTID VERTB [address] Left 1/31/2023    Procedure: EXPLORATION OF LEFT CAROTID ARTERY; ABORTED ENDARTERECTOMY ARTERY CAROTID;  Surgeon: Jesse Dejesus DO;  Location: [facility];  Service: Vascular   • SUPERIOR MESTENTERIC ARTERY STENT     • WISDOM TOOTH EXTRACTION         Current Outpatient Medications:   •  acetaminophen (TYLENOL) 325 mg tablet, Take 2 tablets (650 mg total) by mouth every 6 (six) hours, Disp: , Rfl: 0  •  amiodarone 200 mg tablet, TAKE ONE TABLET BY MOUTH ONCE DAILY, Disp: 30 tablet, Rfl: 5  •  Ascorbic Acid (vitamin C) 1000 MG tablet, Take 1 tablet (1,000 mg total) by mouth daily, Disp: 30 tablet, Rfl: 0  •  aspirin 81 mg chewable tablet, Chew 1 tablet (81 mg total) daily, Disp: , Rfl: 0  •  Cholecalciferol 25 MCG (1000 UT) tablet, Take 1,000 Units by mouth daily, Disp: , Rfl:   •  dexamethasone (DECADRON) 4 mg tablet, 1 tab PO with breakfast for 1 to 5 days for unrelenting migraine, Disp: 5 tablet, Rfl: 0  •  diltiazem (CARDIZEM CD) 120 mg 24 hr capsule, TAKE ONE CAPSULE BY MOUTH ONCE DAILY, Disp: 30 capsule, Rfl: 3  •  Docusate Sodium (COLACE PO), Take by mouth daily at bedtime, Disp: , Rfl:   •  Eliquis 5 MG, TAKE ONE TABLET BY MOUTH TWICE DAILY, Disp: 180 tablet, Rfl: 0  •  famotidine (PEPCID) 20 mg tablet, Take 20 mg by mouth daily at bedtime, Disp: , Rfl:   •  fexofenadine (ALLEGRA) 180 MG tablet, Take 180 mg by mouth daily, Disp: , Rfl:   •  fluticasone-vilanterol (BREO ELLIPTA) 200-25 MCG/INH inhaler, Inhale 1 puff daily Rinse mouth after use., Disp: 3 Inhaler, Rfl: 3  •  furosemide (LASIX) 40 mg tablet, Take 1 tablet (40 mg total) by mouth daily, Disp: 90 tablet, Rfl: 0  •  HYDROcodone-acetaminophen (Norco) 5-325 mg per tablet, Take 1 tablet by mouth every 6 (six) hours as needed for pain Max Daily Amount: 4 tablets, Disp: 10 tablet, Rfl: 0  •  hydrOXYzine HCL (ATARAX) 25 mg tablet, TAKE TWO TABLETS BY MOUTH DAILY AT BEDTIME, Disp: 90 tablet, Rfl: 0  •  LORazepam (ATIVAN) 1 mg tablet, Take 1 tablet (1 mg total) by mouth daily as needed for anxiety, Disp: 30 tablet, Rfl: 0  •  magnesium Oxide (MAG-OX) 400 mg TABS, Take 1 tablet (400 mg total) by mouth daily at bedtime, Disp: 90 tablet, Rfl: 3  •  methocarbamol (ROBAXIN) 500 mg tablet, Take 1 tablet (500 mg total) by mouth 3 (three) times a day, Disp: 90 tablet, Rfl: 2  •  metoprolol succinate (TOPROL-XL) 100 mg 24 hr tablet, Take 1 tablet (100 mg total) by mouth 2 (two) times a day, Disp: 180 tablet, Rfl: 0  •  montelukast (SINGULAIR) 10 mg tablet, TAKE ONE TABLET BY MOUTH DAILY AT BEDTIME, Disp: 90 tablet, Rfl: 0  •  MULTIPLE VITAMIN PO, Take by mouth, Disp: , Rfl:   •  naloxone (NARCAN) 4 mg/0.1 mL nasal spray, Administer 1 spray into a nostril. If no response after 2-3 minutes, give another dose in the other nostril using a new spray., Disp: 1 each, Rfl: 1  •  omeprazole (PriLOSEC) 40 MG capsule, Take 1 capsule (40 mg total) by mouth daily, Disp: 90 capsule, Rfl: 3  •  phenazopyridine (PYRIDIUM) 200 mg tablet, Take 1 tablet (200 mg total) by mouth if needed for bladder spasms, Disp: 30 tablet, Rfl: 3  •  promethazine (PHENERGAN) 25 mg tablet, Take 1 tablet (25 mg total) by mouth every 6 (six) hours as needed for nausea or vomiting, Disp: 60 tablet, Rfl: 5  •  Repatha SureClick 681 MG/ML SOAJ, Inject 140mg under the skin every 2 weeks. , Disp: 2 mL, Rfl: 11  •  spironolactone (ALDACTONE) 25 mg tablet, TAKE ONE TABLET BY MOUTH TWICE DAILY, Disp: 180 tablet, Rfl: 0  •  sucralfate (CARAFATE) 1 g tablet, Take 1 tablet (1 g total) by mouth 2 (two) times a day before meals, Disp: 60 tablet, Rfl: 0  •  traMADol (ULTRAM) 50 mg tablet, TAKE TWO TABLETS BY MOUTH EVERY EIGHT HOURS AS NEEDED for severe pain, Disp: 180 tablet, Rfl: 0    Current Facility-Administered Medications:   •  cyanocobalamin injection 1,000 mcg, 1,000 mcg, Intramuscular, Q30 Days, Korina Frausto DO, 1,000 mcg at 10/02/23 1040        Review of Systems:  Review of Systems   Constitutional: Negative for activity change, fever and unexpected weight change. HENT: Negative for facial swelling, nosebleeds and voice change. Respiratory: Negative for chest tightness, shortness of breath and wheezing. Cardiovascular: Negative for chest pain, palpitations and leg swelling. Gastrointestinal: Negative for abdominal distention. Genitourinary: Negative for hematuria. Musculoskeletal: Negative for arthralgias. Skin: Negative for color change, pallor, rash and wound.    Neurological: Negative for dizziness, seizures and syncope. Psychiatric/Behavioral: Negative for agitation. Physical Exam:  Physical Exam  Vitals reviewed. Constitutional:       Appearance: She is well-developed. HENT:      Head: Normocephalic and atraumatic. Cardiovascular:      Rate and Rhythm: Normal rate and regular rhythm. Heart sounds: Normal heart sounds. Pulmonary:      Effort: Pulmonary effort is normal.      Breath sounds: Normal breath sounds. Abdominal:      Palpations: Abdomen is soft. Musculoskeletal:         General: Normal range of motion. Cervical back: Normal range of motion and neck supple. Skin:     General: Skin is warm and dry. Neurological:      Mental Status: She is alert and oriented to person, place, and time. Psychiatric:         Behavior: Behavior normal.         Thought Content: Thought content normal.         Judgment: Judgment normal.         This note was completed in part utilizing M-Modal Fluency Direct Software. Grammatical errors, random word insertions, spelling mistakes, and incomplete sentences can be an occasional consequence of this system secondary to software limitations, ambient noise, and hardware issues. If you have any questions or concerns about the content, text, or information contained within the body of this dictation, please contact the provider for clarification.

## 2023-10-04 DIAGNOSIS — F41.9 ANXIETY: ICD-10-CM

## 2023-10-05 ENCOUNTER — TELEPHONE (OUTPATIENT)
Dept: NEUROLOGY | Facility: CLINIC | Age: 63
End: 2023-10-05

## 2023-10-05 NOTE — TELEPHONE ENCOUNTER
Spoke to pt to confirm pt's appt on 10/11/23 w/ Néstor Hargrove in [facility]. Pt confirmed.

## 2023-10-06 ENCOUNTER — OFFICE VISIT (OUTPATIENT)
Dept: VASCULAR SURGERY | Facility: CLINIC | Age: 63
End: 2023-10-06
Payer: COMMERCIAL

## 2023-10-06 ENCOUNTER — PATIENT OUTREACH (OUTPATIENT)
Dept: FAMILY MEDICINE CLINIC | Facility: CLINIC | Age: 63
End: 2023-10-06

## 2023-10-06 VITALS
HEIGHT: 64 IN | HEART RATE: 64 BPM | BODY MASS INDEX: 30.9 KG/M2 | SYSTOLIC BLOOD PRESSURE: 100 MMHG | DIASTOLIC BLOOD PRESSURE: 62 MMHG | WEIGHT: 181 LBS | OXYGEN SATURATION: 98 %

## 2023-10-06 DIAGNOSIS — I65.22 STENOSIS OF LEFT CAROTID ARTERY: Primary | ICD-10-CM

## 2023-10-06 DIAGNOSIS — K55.069 MESENTERIC ARTERY THROMBOSIS (HCC): ICD-10-CM

## 2023-10-06 PROCEDURE — 99214 OFFICE O/P EST MOD 30 MIN: CPT | Performed by: SURGERY

## 2023-10-06 RX ORDER — LORAZEPAM 1 MG/1
1 TABLET ORAL 3 TIMES DAILY
Qty: 90 TABLET | Refills: 0 | Status: SHIPPED | OUTPATIENT
Start: 2023-10-06

## 2023-10-06 NOTE — LETTER
Date: 10/06/23    Dear Sally Avitia,   My name is Jong Espitia; I am a registered nurse care manager working with [employer]  I have not been able to reach you and would like to set a time that I can talk with you over the phone. My work is to help patients that have complex medical conditions get the care they need. This includes patients who may have been in the hospital or emergency room. I have enclosed my contact information below for you. Please call me with any questions you may have. I look forward to hearing from  you.   Sincerely,  Sonia Rodrigues RN  [phone]  Outpatient Care Manager

## 2023-10-06 NOTE — ASSESSMENT & PLAN NOTE
History of SMA stent. Relatively recent CT imaging demonstrates widely patent stent as well as celiac axis. Continue surveillance.

## 2023-10-06 NOTE — ASSESSMENT & PLAN NOTE
Patient with significant asymptomatic left carotid stenosis. Attempted left carotid endarterectomy which was ultimately aborted due to difficulty obtaining adequate distal ICA exposure/control in order to perform procedure safely. Fortunately she has not had any neurologic events due to her high-grade stenosis. I have reached out to the [facility] rep to further evaluate whether she is a candidate for a possible transcarotid artery revascularization/stent. Having said that patient over the summer had abdominal pain and ultimately required exploratory laparotomy with left colon resection. She reports she is continuing to heal from that major surgery and would defer any carotid intervention if feasible till after the new year. We will obtain a interval carotid duplex in 6 months.

## 2023-10-06 NOTE — PROGRESS NOTES
Assessment/Plan:    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HCC)  History of SMA stent. Relatively recent CT imaging demonstrates widely patent stent as well as celiac axis. Continue surveillance. Stenosis of left carotid artery  Patient with significant asymptomatic left carotid stenosis. Attempted left carotid endarterectomy which was ultimately aborted due to difficulty obtaining adequate distal ICA exposure/control in order to perform procedure safely. Fortunately she has not had any neurologic events due to her high-grade stenosis. I have reached out to the [facility] to further evaluate whether she is a candidate for a possible transcarotid artery revascularization/stent. Having said that patient over the summer had abdominal pain and ultimately required exploratory laparotomy with left colon resection. She reports she is continuing to heal from that major surgery and would defer any carotid intervention if feasible till after the new year. We will obtain a interval carotid duplex in 6 months. Diagnoses and all orders for this visit:    Stenosis of left carotid artery  -     VAS carotid complete study; Future    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HCC)          Subjective:      Patient ID: Morgan Terry is a 70 y.o. female. Patient presents today to review CV done 9/12/2023. Patient is s/p aborted L CEA done 1/31/2023. Patient c/o intermittent swelling of incision on left neck. Patient denies headaches, dizziness, sudden loss of vision, slurred speech, trouble swallowing, weakness, TIA or Stroke Symptoms. Patient takes ASA 81mg and Eliquis. Patient is a former smoker. Donnie Arenas returns to the office to review surveillance carotid imaging.       The following portions of the patient's history were reviewed and updated as appropriate: allergies, current medications, past family history, past medical history, past social history, past surgical history and problem list.    Review of Systems Objective:      /62 (BP Location: Right arm, Patient Position: Sitting, Cuff Size: Large)   Pulse 64   Ht 5' 4" (1.626 m)   Wt 82.1 kg (181 lb)   LMP  (LMP Unknown)   SpO2 98%   BMI 31.07 kg/m²          Physical Exam  Constitutional:       General: She is not in acute distress. Appearance: She is well-developed. HENT:      Head: Normocephalic and atraumatic. Eyes:      General: No scleral icterus. Conjunctiva/sclera: Conjunctivae normal.   Neck:      Trachea: No tracheal deviation. Cardiovascular:      Rate and Rhythm: Normal rate. Pulmonary:      Effort: Pulmonary effort is normal.   Abdominal:      General: There is no distension. Palpations: Abdomen is soft. There is no mass (no appreciable aortic pulsation/aneurysm). Tenderness: There is no abdominal tenderness. There is no guarding or rebound. Comments: Well-healed midline laparotomy incision from recent exploratory laparotomy/colon resection. Musculoskeletal:         General: Normal range of motion. Cervical back: Normal range of motion and neck supple. Skin:     General: Skin is warm and dry. Neurological:      Mental Status: She is alert and oriented to person, place, and time. Psychiatric:         Mood and Affect: Mood normal.         Behavior: Behavior normal.         Thought Content: Thought content normal.         Judgment: Judgment normal.             Carotid duplex:  FINDINGS:     Right        Impression  PSV  EDV (cm/s)  Ratio    Dist. ICA                 61          26   0.62    Mid. ICA                  56          17   0.57    Prox.  ICA    1 - 49%      59          22   0.60    Dist CCA                  75          19           Mid CCA                   98          22   0.85    Prox CCA                 116          18           Ext Carotid              121           0   1.22    Prox Vert                 73          20           Subclavian               226          19              Left Impression  PSV  EDV (cm/s)  Ratio    Dist. ICA                 38          17   0.49    Mid. ICA                  92          35   1.19    Prox. ICA    70 - 99%    357         105   4.61    Dist CCA                  61          17           Mid CCA                   77          21   0.92    Prox CCA                  84          21           Ext Carotid              211          14   2.73    Prox Vert                 14           4           Subclavian               156           0                    CONCLUSION:     Impression  RIGHT:  There is <50% stenosis noted in the internal carotid artery. Plaque is  heterogenous and smooth. Vertebral artery flow is antegrade. Mild subclavian artery disease. LEFT:  There is 70-99% stenosis noted in the internal carotid artery. Plaque is  heterogenous and irregular. Vertebral artery flow is antegrade. There is no significant subclavian artery  disease. Compared to previous study on 6/1/2023, there is no significant change. (s)

## 2023-10-09 ENCOUNTER — OFFICE VISIT (OUTPATIENT)
Dept: GASTROENTEROLOGY | Facility: CLINIC | Age: 63
End: 2023-10-09
Payer: COMMERCIAL

## 2023-10-09 VITALS
SYSTOLIC BLOOD PRESSURE: 132 MMHG | BODY MASS INDEX: 30.22 KG/M2 | HEIGHT: 64 IN | DIASTOLIC BLOOD PRESSURE: 70 MMHG | WEIGHT: 177 LBS | HEART RATE: 68 BPM

## 2023-10-09 DIAGNOSIS — R14.0 BLOATING: ICD-10-CM

## 2023-10-09 DIAGNOSIS — R10.9 CHRONIC ABDOMINAL PAIN: Primary | ICD-10-CM

## 2023-10-09 DIAGNOSIS — K21.9 GASTROESOPHAGEAL REFLUX DISEASE, UNSPECIFIED WHETHER ESOPHAGITIS PRESENT: ICD-10-CM

## 2023-10-09 DIAGNOSIS — K58.1 IRRITABLE BOWEL SYNDROME WITH CONSTIPATION: ICD-10-CM

## 2023-10-09 DIAGNOSIS — G89.29 CHRONIC ABDOMINAL PAIN: Primary | ICD-10-CM

## 2023-10-09 PROCEDURE — 99214 OFFICE O/P EST MOD 30 MIN: CPT | Performed by: INTERNAL MEDICINE

## 2023-10-09 RX ORDER — OMEPRAZOLE 40 MG/1
40 CAPSULE, DELAYED RELEASE ORAL
Qty: 60 CAPSULE | Refills: 2 | Status: SHIPPED | OUTPATIENT
Start: 2023-10-09 | End: 2024-01-07

## 2023-10-09 NOTE — PROGRESS NOTES
[facility] - Outpatient Follow-up Note  Madison Fong 70 y.o. female MRN: [id number]  Encounter: [id number]          ASSESSMENT AND PLAN:      1. GERD  2. Nausea  3. Burping  4. Irritable bowel syndrome constipation type    Currently heartburn symptoms are not controlled. Will increase patient's omeprazole dose to 40 mg twice daily. Continue other medications. If heartburn/nausea/burping not controlled despite omeprazole twice daily then would recommend repeating EGD and also getting pH study. IBS symptoms mostly controlled. Has occasional abdominal pain, bloating. Continue constipation medications including Colace and MiraLAX. Ordered SIBO test further management per patient's primary GI provider Yamila Guerra. Encourage patient to make sure that she has regular bowel movements and no constipation when she gets the breath test for SIBO. RTC 2 months. Ladi Cid MD  Gastroenterology  [facility]  Date: October 9, 2023    ______________________________________________________________________    SUBJECTIVE: 64-year-old with history of ischemic colitis, status post left hemicolectomy, SMA thrombosis status post stent placement, CAD, IBS constipation type presents for follow-up. Last visit patient reported acute exacerbation of abdominal pain. CT was ordered which was reviewed by me today and showed omental infarct area. She was subsequently seen by Dr. John Paul Mcfarlane from surgery service and the report from the office visit was reviewed by me today. There was no surgical intervention needed per Dr. John Paul Mcfarlane recommendations. Vascular surgery saw patient recently and recommendations were to continue conservative management and monitoring with duplex in 6 months. Patient reports that she has 1-2 bowel movements per day. She is taking Colace and MiraLAX. She continues to have nausea, burping and heartburn intermittently.   She has been taking omeprazole 40 mg daily, Pepcid 20 mg and Phenergan as needed. Patient had EGD in April 2022 which was normal.  She had colonoscopy in July 2022 which was normal and repeat in 10 years was recommended. REVIEW OF SYSTEMS IS OTHERWISE NEGATIVE. Historical Information   Past Medical History:   Diagnosis Date   • A-fib ([address]) 03/2020   • Allergic    • Anxiety    • Arthritis    • Asthma    • Back pain     and muscle pain   • Bruises easily    • Chronic pain disorder     Interstitial pain   • Colon polyp    • Dental bridge present    • Depression    • Eczema    • GERD (gastroesophageal reflux disease)    • Heart murmur    • History of blood clots     per pt "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"   • History of pneumonia    • Hives    • Hyperlipidemia    • Hypertension    • Infertility, female    • Interstitial cystitis    • Migraine     sees neurologist   • Motion sickness    • Palpitations    • PONV (postoperative nausea and vomiting)    • Premature atrial contractions 11/9/2022   • Psychiatric disorder    • TIA (transient ischemic attack) 09/2022    per pt --"had MRI and 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"   • Urinary incontinence     wears Pads   • Venous insufficiency 8/1/2017   • Wears glasses      Past Surgical History:   Procedure Laterality Date   • COLONOSCOPY     • DILATION AND CURETTAGE OF UTERUS     • EGD     • EXPLORATORY LAPAROTOMY      for infertility   • EXPLORATORY LAPAROTOMY W/ BOWEL RESECTION N/A 7/19/2023    Procedure: LAPAROTOMY EXPLORATORY W/ BOWEL RESECTION;  Surgeon: Coy Bailey MD;  Location: [facility];  Service: General   • HAND SURGERY      Hand excision of tendon cyst   • WI LAPAROSCOPIC APPENDECTOMY N/A 05/03/2022    Procedure: APPENDECTOMY LAPAROSCOPIC;  Surgeon:  Sarthak Joya MD;  Location: [facility];  Service: General   • WI LAPS ABD PRTM&OMENTUM DX W/WO SPEC BR/WA SPX N/A 7/19/2023    Procedure: LAPAROSCOPY DIAGNOSTIC, LYSIS OF ADHESIONS, LAPAROSCOPY TAKE TAKEDOWN OF LEFT COLON, EXPLORATORY LAPAROSCOPY, LYSIS OF ADHESIONS, RESECTION OF TRANSVERSE COLON SPLENIC FLEXURE AND DESCENDING COLON , TAKE DOWN OF SPLENIC FLEXURE, SIGMOIDOSCOPY, MOBILIZATION OF RIGHT COLON, PRIMARY ANASTOMOSIS EEA 34, TAP BLOCK;  Surgeon: Caridad Akins MD;  Location: [facility];  Service: General   • MS TEAEC W/PATCH GRF CAROTID VERTB [address]    Procedure: EXPLORATION OF LEFT CAROTID ARTERY; ABORTED ENDARTERECTOMY ARTERY CAROTID;  Surgeon: Terry Carvalho DO;  Location: [facility];  Service: Vascular   • SUPERIOR MESTENTERIC ARTERY STENT     • WISDOM TOOTH EXTRACTION       Social History   Social History     Substance and Sexual Activity   Alcohol Use Not Currently     Social History     Substance and Sexual Activity   Drug Use No     Social History     Tobacco Use   Smoking Status Former   • Packs/day: 0.50   • Years: 10.00   • Total pack years: 5.00   • Types: Cigarettes   • Quit date:    • Years since quittin.7   • Passive exposure: Never   Smokeless Tobacco Never     Family History   Problem Relation Age of Onset   • Lung cancer Mother 61   • Brain cancer Mother 61   • Diabetes Father    • Depression Father    • Other Father         septic   • Allergies Sister    • Hashimoto's thyroiditis Sister    • Abdominal aortic aneurysm Sister    • Diabetes Sister    • No Known Problems Sister    • No Known Problems Maternal Grandmother    • No Known Problems Maternal Grandfather    • No Known Problems Paternal Grandmother    • No Known Problems Paternal Grandfather    • Hodgkin's lymphoma Brother    • No Known Problems Brother    • No Known Problems Maternal Aunt    • Diabetes Other        Meds/Allergies       Current Outpatient Medications:   •  acetaminophen (TYLENOL) 325 mg tablet  •  amiodarone 200 mg tablet  •  Ascorbic Acid (vitamin C) 1000 MG tablet  •  aspirin 81 mg chewable tablet  •  Cholecalciferol 25 MCG (1000 UT) tablet  • dexamethasone (DECADRON) 4 mg tablet  •  diltiazem (CARDIZEM CD) 120 mg 24 hr capsule  •  Docusate Sodium (COLACE PO)  •  Eliquis 5 MG  •  famotidine (PEPCID) 20 mg tablet  •  fexofenadine (ALLEGRA) 180 MG tablet  •  fluticasone-vilanterol (BREO ELLIPTA) 200-25 MCG/INH inhaler  •  furosemide (LASIX) 40 mg tablet  •  hydrOXYzine HCL (ATARAX) 25 mg tablet  •  LORazepam (ATIVAN) 1 mg tablet  •  magnesium Oxide (MAG-OX) 400 mg TABS  •  methocarbamol (ROBAXIN) 500 mg tablet  •  metoprolol succinate (TOPROL-XL) 100 mg 24 hr tablet  •  montelukast (SINGULAIR) 10 mg tablet  •  naloxone (NARCAN) 4 mg/0.1 mL nasal spray  •  omeprazole (PriLOSEC) 40 MG capsule  •  phenazopyridine (PYRIDIUM) 200 mg tablet  •  promethazine (PHENERGAN) 25 mg tablet  •  Repatha SureClick 205 MG/ML SOAJ  •  spironolactone (ALDACTONE) 25 mg tablet  •  sucralfate (CARAFATE) 1 g tablet  •  traMADol (ULTRAM) 50 mg tablet  •  HYDROcodone-acetaminophen (Norco) 5-325 mg per tablet  •  MULTIPLE VITAMIN PO    Current Facility-Administered Medications:   •  cyanocobalamin injection 1,000 mcg, 1,000 mcg, Intramuscular, Q30 Days, 1,000 mcg at 10/02/23 1040    Allergies   Allergen Reactions   • Ace Inhibitors Angioedema and Anaphylaxis     Anaphylaxis   • Benicar [Olmesartan] Angioedema     See Allergy note from 9/11/2008. Swollen ankles/legs   • Hydralazine Other (See Comments)     Lip swelling  Flank pain   • Other Anaphylaxis and Other (See Comments)     Other reaction(s): angioadema  Other reaction(s):  Other (See Comments)  Preservatives- itching throat closes, hi  Other reaction(s): angioadema  E Z Cat - hives throat closes itching,  Preservatives- itching throat closes, hi  Artificial sweeteners   • Valsartan Angioedema     Lips, face swollen   • Sulfa Antibiotics Hives     stuffiness,itching,hives,throat closing--per pt "sometimes able to take depending on the brand and the filler or possibly preservatives in it"   • Ampicillin Hives     Depends on brand some preservatives can react. Has recently tolerated oral amoxicillin. • Motrin [Ibuprofen] Other (See Comments)     Per pt " told not to take due to A Fib"   • Wound Dressing Adhesive Other (See Comments)     Per pt Adhesive on EKG leads caused redness           Objective     Blood pressure 132/70, pulse 68, height 5' 4" (1.626 m), weight 80.3 kg (177 lb), not currently breastfeeding. Body mass index is 30.38 kg/m². PHYSICAL EXAM:      General Appearance:   Alert, cooperative, no distress   HEENT:   Normocephalic, atraumatic, anicteric.     Neck:  Supple, symmetrical, trachea midline   Lungs:   Clear to auscultation bilaterally; no rales, rhonchi or wheezing; respirations unlabored    Heart[de-identified]   Regular rate and rhythm; no murmur, rub, or gallop. Abdomen:   Soft, non-tender, non-distended; normal bowel sounds; no masses, no organomegaly    Genitalia:   Deferred    Rectal:   Deferred    Extremities:  No cyanosis, clubbing or edema    Pulses:  2+ and symmetric    Skin:  No jaundice, rashes, or lesions    Lymph nodes:  No palpable cervical lymphadenopathy        Lab Results:   No visits with results within 1 Day(s) from this visit.    Latest known visit with results is:   Admission on 09/08/2023, Discharged on 09/08/2023   Component Date Value   • WBC 09/08/2023 5.96    • RBC 09/08/2023 4.02    • Hemoglobin 09/08/2023 11.4 (L)    • Hematocrit 09/08/2023 35.7    • MCV 09/08/2023 89    • MCH 09/08/2023 28.4    • MCHC 09/08/2023 31.9    • RDW 09/08/2023 12.6    • MPV 09/08/2023 9.0    • Platelets 57/70/2359 260    • nRBC 09/08/2023 0    • Neutrophils Relative 09/08/2023 54    • Immat GRANS % 09/08/2023 0    • Lymphocytes Relative 09/08/2023 33    • Monocytes Relative 09/08/2023 8    • Eosinophils Relative 09/08/2023 4    • Basophils Relative 09/08/2023 1    • Neutrophils Absolute 09/08/2023 3.25    • Immature Grans Absolute 09/08/2023 0.02    • Lymphocytes Absolute 09/08/2023 1.95    • Monocytes Absolute 09/08/2023 0.46    • Eosinophils Absolute 09/08/2023 0.24    • Basophils Absolute 09/08/2023 0.04    • Sodium 09/08/2023 138    • Potassium 09/08/2023 3.1 (L)    • Chloride 09/08/2023 100    • CO2 09/08/2023 27    • ANION GAP 09/08/2023 11    • BUN 09/08/2023 13    • Creatinine 09/08/2023 0.78    • Glucose 09/08/2023 128    • Calcium 09/08/2023 9.5    • eGFR 09/08/2023 80    • LACTIC ACID 09/08/2023 1.5          Radiology Results:   VAS carotid complete study    Result Date: 9/12/2023  Narrative:  [facility] REPORT CLINICAL: Indications:  Surveillance of carotid artery disease. Patient states that she has intermittent episodes of grey vision that occurs with urgency of urination, otherwise, asymptomatic. Operative History: 2023-01-31 CEA Aborted 2021-08-01 SMA Stent Laparoscopic Surgery for infertility Risk Factors:Obesity, HTN, TIA, CHF, A FIB, COPD, Hyperlipidemia and previous smoking. Clinical Right Pressure:  119/56 mm Hg, Left Pressure:  121/58 mm Hg. FINDINGS:  Right        Impression  PSV  EDV (cm/s)  Ratio  Dist. ICA                 61          26   0.62  Mid. ICA                  56          17   0.57  Prox. ICA    1 - 49%      59          22   0.60  Dist CCA                  75          19         Mid CCA                   98          22   0.85  Prox CCA                 116          18         Ext Carotid              121           0   1.22  Prox Vert                 73          20         Subclavian               226          19          Left         Impression  PSV  EDV (cm/s)  Ratio  Dist. ICA                 38          17   0.49  Mid. ICA                  92          35   1.19  Prox.  ICA    70 - 99%    357         105   4.61  Dist CCA                  61          17         Mid CCA                   77          21   0.92  Prox CCA                  84          21         Ext Carotid              211          14   2.73  Prox Vert                 14           4         Subclavian 156           0            CONCLUSION:  Impression RIGHT: There is <50% stenosis noted in the internal carotid artery. Plaque is heterogenous and smooth. Vertebral artery flow is antegrade. Mild subclavian artery disease. LEFT: There is 70-99% stenosis noted in the internal carotid artery. Plaque is heterogenous and irregular. Vertebral artery flow is antegrade. There is no significant subclavian artery disease. Compared to previous study on 6/1/2023, there is no significant change.   SIGNATURE: Electronically Signed by: [place] on 2023-09-12 03:09:53 PM

## 2023-10-12 DIAGNOSIS — F41.9 ANXIETY: ICD-10-CM

## 2023-10-12 DIAGNOSIS — M54.16 LUMBAR RADICULOPATHY: ICD-10-CM

## 2023-10-12 DIAGNOSIS — Z90.49 S/P SMALL BOWEL RESECTION: ICD-10-CM

## 2023-10-12 NOTE — TELEPHONE ENCOUNTER
10/11 9:50    Pt left a VM cancelling her appt r/t illness. Transcribed VM:   Hi, good morning. This is Bruce Sachi calling. YOB: 1960. I have an appointment this morning with Dr. Julia Carter. I won't be in this morning. I'm not feeling very well. So I will call back in a day or so to reschedule the appointment. Okay, thank you so much bye. Message received too late. Pt marked as No Show for yesterday's appt.

## 2023-10-13 RX ORDER — TRAMADOL HYDROCHLORIDE 50 MG/1
100 TABLET ORAL EVERY 8 HOURS PRN
Qty: 180 TABLET | Refills: 0 | Status: SHIPPED | OUTPATIENT
Start: 2023-10-13

## 2023-10-15 DIAGNOSIS — K21.9 GASTROESOPHAGEAL REFLUX DISEASE WITHOUT ESOPHAGITIS: ICD-10-CM

## 2023-10-16 RX ORDER — SUCRALFATE 1 G/1
TABLET ORAL
Qty: 60 TABLET | Refills: 0 | Status: SHIPPED | OUTPATIENT
Start: 2023-10-16

## 2023-10-18 DIAGNOSIS — I10 BENIGN ESSENTIAL HYPERTENSION: ICD-10-CM

## 2023-10-18 DIAGNOSIS — I10 ESSENTIAL HYPERTENSION: ICD-10-CM

## 2023-10-18 RX ORDER — FUROSEMIDE 40 MG/1
40 TABLET ORAL DAILY
Qty: 90 TABLET | Refills: 0 | Status: SHIPPED | OUTPATIENT
Start: 2023-10-18

## 2023-10-20 ENCOUNTER — PATIENT OUTREACH (OUTPATIENT)
Dept: FAMILY MEDICINE CLINIC | Facility: CLINIC | Age: 63
End: 2023-10-20

## 2023-10-20 NOTE — PROGRESS NOTES
Patient returned my call. She continues to have nausea daily, taking phenergan with relief. She also c/o L sided abdominal pain, taking tramadol. Prilosec was increased at last GI appointment. Her weight has remained stable. She denies chest pain or sob. She has mild edema to ankles. Her anxiety is better controlled at this time. She denies constipation and is having soft stool. She is able to tolerate her diet despite the nausea. She takes all medications as prescribed. Follow up appointments scheduled. She was referred to palliative care at last PCP appointment. Provided the contact information to call to schedule. She denies needing additional help at home. Agreeable to continued outreach.

## 2023-10-23 DIAGNOSIS — G43.709 CHRONIC MIGRAINE WITHOUT AURA WITHOUT STATUS MIGRAINOSUS, NOT INTRACTABLE: ICD-10-CM

## 2023-10-23 RX ORDER — DEXAMETHASONE 4 MG/1
TABLET ORAL
Qty: 5 TABLET | Refills: 0 | Status: SHIPPED | OUTPATIENT
Start: 2023-10-23

## 2023-10-24 DIAGNOSIS — M54.16 LUMBAR RADICULOPATHY: ICD-10-CM

## 2023-10-24 RX ORDER — METHOCARBAMOL 500 MG/1
500 TABLET, FILM COATED ORAL 3 TIMES DAILY
Qty: 90 TABLET | Refills: 1 | Status: SHIPPED | OUTPATIENT
Start: 2023-10-24

## 2023-10-25 ENCOUNTER — OFFICE VISIT (OUTPATIENT)
Dept: NEUROLOGY | Facility: CLINIC | Age: 63
End: 2023-10-25
Payer: COMMERCIAL

## 2023-10-25 VITALS
BODY MASS INDEX: 30.71 KG/M2 | SYSTOLIC BLOOD PRESSURE: 118 MMHG | HEART RATE: 52 BPM | HEIGHT: 64 IN | DIASTOLIC BLOOD PRESSURE: 68 MMHG | TEMPERATURE: 97.9 F | WEIGHT: 179.9 LBS | OXYGEN SATURATION: 96 %

## 2023-10-25 DIAGNOSIS — I65.29 CAROTID ARTERY STENOSIS: ICD-10-CM

## 2023-10-25 DIAGNOSIS — G43.009 MIGRAINE WITHOUT AURA AND WITHOUT STATUS MIGRAINOSUS, NOT INTRACTABLE: ICD-10-CM

## 2023-10-25 DIAGNOSIS — M54.2 CERVICALGIA: ICD-10-CM

## 2023-10-25 DIAGNOSIS — R20.2 PARESTHESIA: Primary | ICD-10-CM

## 2023-10-25 PROCEDURE — 99214 OFFICE O/P EST MOD 30 MIN: CPT | Performed by: NURSE PRACTITIONER

## 2023-10-25 RX ORDER — POTASSIUM CHLORIDE 750 MG/1
10 TABLET, FILM COATED, EXTENDED RELEASE ORAL 2 TIMES DAILY
COMMUNITY

## 2023-10-25 RX ORDER — ASCORBIC ACID 500 MG
50 TABLET ORAL DAILY
COMMUNITY

## 2023-10-25 RX ORDER — IPRATROPIUM BROMIDE 17 UG/1
2 AEROSOL, METERED RESPIRATORY (INHALATION) 4 TIMES DAILY PRN
COMMUNITY
Start: 2023-10-17

## 2023-10-25 NOTE — ASSESSMENT & PLAN NOTE
Patient with symptoms of left arm and leg numbness and tingling that started in September 2022 work up at the time was negative for stroke, did find L ICA stenosis, however did not feel these were contributing to the symptoms at the time. She did have failed L CEA in January 2023 with with concern for mandibular/hypoglossal nerve palsy given L facial droop and numbness. Her facial symptoms continue to improve, however she continues with intermittent LUE and LLE numbness and tingling. She does feel intermittent weakness in the LUE as well. On exam, she did have normal strength testing, sensory exam continues to have some patchy inconsistencies that do not follow specific distribution bilaterally. Normal reflexes. She did have MRI brain at time of onset that was negative for stroke, MRI cspine with normal cord, degenerative changes with multilevel foraminal narrowing. She has an EMG scheduled in the near future. I do have suspicion for cervical radiculopathy causing some of her LUE, EMG is scheduled and can further assess and rule out any other focal process. She does admit to left sided neck pain. She states LLE symptoms are less bothersome. Reports mild low back pain, no new bowel or bladder symptoms. Recommend PT for her neck pain, she should discuss with vascular surgery if she has any limitation given her past carotid artery surgery and complications and stenosis. She did recently start robaxin which has helped with some of her neck pain. She was hesitant to try gabapentin/lyrica as she had side effects in the past with gabapentin. If no improvement with PT or neck pain worsens can consider referral to pain management. Plan for follow up in 6 months pending EMG results, will contact her in the interim for these results. To contact the office sooner with any concerns or worsening symptoms.

## 2023-10-25 NOTE — PROGRESS NOTES
Patient ID: Abhijeet Cano is a 70 y.o. female. Assessment/Plan:    Migraines  Continue follow up with headache team.    Carotid artery stenosis  Continue follow up with vascular surgery. Paresthesia  Patient with symptoms of left arm and leg numbness and tingling that started in September 2022 work up at the time was negative for stroke, did find L ICA stenosis, however did not feel these were contributing to the symptoms at the time. She did have failed L CEA in January 2023 with with concern for mandibular/hypoglossal nerve palsy given L facial droop and numbness. Her facial symptoms continue to improve, however she continues with intermittent LUE and LLE numbness and tingling. She does feel intermittent weakness in the LUE as well. On exam, she did have normal strength testing, sensory exam continues to have some patchy inconsistencies that do not follow specific distribution bilaterally. Normal reflexes. She did have MRI brain at time of onset that was negative for stroke, MRI cspine with normal cord, degenerative changes with multilevel foraminal narrowing. She has an EMG scheduled in the near future. I do have suspicion for cervical radiculopathy causing some of her LUE, EMG is scheduled and can further assess and rule out any other focal process. She does admit to left sided neck pain. She states LLE symptoms are less bothersome. Reports mild low back pain, no new bowel or bladder symptoms. Recommend PT for her neck pain, she should discuss with vascular surgery if she has any limitation given her past carotid artery surgery and complications and stenosis. She did recently start robaxin which has helped with some of her neck pain. She was hesitant to try gabapentin/lyrica as she had side effects in the past with gabapentin. If no improvement with PT or neck pain worsens can consider referral to pain management.      Plan for follow up in 6 months pending EMG results, will contact her in the interim for these results. To contact the office sooner with any concerns or worsening symptoms. Diagnoses and all orders for this visit:    Paresthesia    Carotid artery stenosis    Cervicalgia  -     Ambulatory Referral to Physical Therapy; Future    Migraine without aura and without status migrainosus, not intractable    Other orders  -     Atrovent HFA 17 MCG/ACT inhaler; Inhale 2 puffs 4 (four) times a day as needed  -     potassium chloride (Klor-Con) 10 mEq tablet; Take 10 mEq by mouth 2 (two) times a day  -     Chelated Zinc 50 MG TABS; Take 50 mg by mouth daily           Subjective:    HPI    Patient is a 70year old female with recent failed L CEA attempted on January 31st (with concern for mandibular/hypoglossal nerve palsy given L facial droop and numbness), chronic headaches/migraines, anxiety, afib on Eliquis who presents today for follow up for parathesias and weakness. Per chart review, she presented as a stroke alert on 02/20 at recommendation of home PT/OT and PCP following ongoing L facial numbness, L facial asymmetry and L chest/L LE pain and weakness; when discussed further with patient, she endorses that these symptoms are not acute and have been ongoing for at least several days. 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 SHe reported some left sided numbness and tingling, MRI was negative for acute stroke     Last office visit 4/2023 in which mri cspine and EMG were ordered. She follows with Dr. Ann Teague for her headaches     Interval History:  She continues with deal with left sided numbness and tingling. She has left facial numbness in V2 V3. May or may not occur with headache, has been ongoing since carotid surgery. She has left arm numbness can occur with or without the facial numbness.  Can occur sometimes with a headache but not always. She will have numbness that will extend from her neck all the way down to her fingers. She will feel the LUE is weak when she has the numbness. If she does not have numbness she does not feel the arm is weak. She feels the LLE occurs less then facial and LUE symptoms. She will really only notice when she touches her leg as it feels different. No LLE weakness. She denies any truncal numbness. She did have exploratory laparotomy with left colon resection in July. No recent falls. She feels her walking and balance is improved since last visit. She continues to follow with vascular surgery, is looking into possible transcarotid artery revascularization/stent. She does feel migraines recently are under better control, less severe episodes, continues to follow with Dr. Caitlin Roht. She has low back back around the pant line, does not radiate down the leg. She denies any new bowel or bladder incontinence, occasionally urinary urgency. She has neck pain in the left cervical muscles. She is robaxin for spasm from abdominal surgery but this has helped with neck pain. EMG scheduled in dec. She has been on gabapentin in the past with side effect.     work up:  mri cspine 4/2023: Mild scattered spondylosis without cord compression or cord signal abnormality. labs-b6, spep, sed rate, folate, b1 normal , b12 511         The following portions of the patient's history were reviewed and updated as appropriate: allergies, current medications, past family history, past medical history, past social history, past surgical history and problem list.       Objective:    Blood pressure 118/68, pulse (!) 52, temperature 97.9 °F (36.6 °C), temperature source Temporal, height 5' 4" (1.626 m), weight 81.6 kg (179 lb 14.4 oz), SpO2 96 %, not currently breastfeeding. Physical Exam  Constitutional:       General: She is awake.    HENT:      Right Ear: Hearing normal.      Left Ear: Hearing normal.   Eyes:      General: Lids are normal.      Extraocular Movements: Extraocular movements intact. Pupils: Pupils are equal, round, and reactive to light. Neurological:      Mental Status: She is alert. Deep Tendon Reflexes:      Reflex Scores:       Bicep reflexes are 2+ on the right side and 2+ on the left side. Brachioradialis reflexes are 2+ on the right side and 2+ on the left side. Patellar reflexes are 2+ on the right side and 2+ on the left side. Achilles reflexes are 0 on the right side. Psychiatric:         Speech: Speech normal.         Neurological Exam  Mental Status  Awake and alert. Speech is normal. Language is fluent with no aphasia. Cranial Nerves  CN III, IV, VI: Extraocular movements intact bilaterally. Normal lids and orbits bilaterally. Pupils equal round and reactive to light bilaterally. CN V:  Right: Facial sensation is normal.  Left: Facial sensation is normal on the left. CN VII:  Right: There is no facial weakness. Left: Left face droop, less significant then previous. CN VIII:  Right: Hearing is normal.  Left: Hearing is normal.  CN IX, X: Palate elevates symmetrically  CN XI:  Right: Trapezius strength is normal.  Left: Trapezius strength is normal.  CN XII: Tongue midline without atrophy or fasciculations. Motor  Normal muscle bulk throughout.                                                Right                     Left  Elbow flexion                         5                          5  Elbow extension                    5                          5  Wrist flexion                           5                          5  Wrist extension                      5                          5  Finger flexion                         5                          5  Finger abduction                    5                          5  Hip flexion                              5                          5  Knee flexion                           5 5  Knee extension                      5                          5  Ankle inversion                      5                          5  Ankle eversion                       5                          5  Plantarflexion                         5                          5  Dorsiflexion                            5                          5  Spasm and tenderness in the left cervical muscles. Sensory  Light touch is normal in upper and lower extremities. Pinprick abnormality: Patchy sensory loss in the LLE and LUE, does not follow specific distribution. . Temperature abnormality: Endorsed diminished sensation in the left hand, normal in the RUE, diminished to the knees in b/l LE no clear difference RLE vs LLE as it was patchy . Vibration abnormality: Mildly reduced in the toes. Proprioception is normal in upper and lower extremities. Reflexes                                            Right                      Left  Brachioradialis                    2+                         2+  Biceps                                 2+                         2+  Patellar                                2+                         2+  Achilles                                0                          Right Plantar: downgoing  Left Plantar: downgoing    Right pathological reflexes: Jessica's absent. Left pathological reflexes: Jessica's absent. Coordination  Right: Finger-to-nose normal.Left: Finger-to-nose normal.    Gait  Casual gait is normal including stance, stride, and arm swing. Able to rise from chair without using arms. I have personally reviewed the ROS performed by the MA.    ROS:    Review of Systems   Constitutional:  Negative for appetite change, fatigue and fever. HENT: Negative. Negative for hearing loss, tinnitus, trouble swallowing and voice change. Eyes: Negative. Negative for photophobia, pain and visual disturbance. Respiratory: Negative.   Negative for shortness of breath. Cardiovascular: Negative. Negative for palpitations. Gastrointestinal:  Positive for nausea. Negative for vomiting. Endocrine: Negative. Negative for cold intolerance. Genitourinary: Negative. Negative for dysuria, frequency and urgency. Musculoskeletal:  Negative for back pain, gait problem, myalgias and neck pain. Skin: Negative. Negative for rash. Allergic/Immunologic: Negative. Neurological:  Positive for numbness (tingling in left arm and left side of the face and left leg). Negative for dizziness, tremors, seizures, syncope, facial asymmetry, speech difficulty, weakness, light-headedness and headaches. Hematological: Negative. Does not bruise/bleed easily. Psychiatric/Behavioral: Negative. Negative for confusion, hallucinations and sleep disturbance. All other systems reviewed and are negative.

## 2023-10-25 NOTE — PATIENT INSTRUCTIONS
Refer to PT for neck pain, would discuss if ok with vascular surgery first given you carotid stenosis/past surgery    EMG as scheduled to assess for any nerve damage/injury that can be contributing to symptoms     Future options for pain include referral to pain management if neck pain and left arm symptoms worsen

## 2023-10-27 ENCOUNTER — TELEPHONE (OUTPATIENT)
Dept: FAMILY MEDICINE CLINIC | Facility: CLINIC | Age: 63
End: 2023-10-27

## 2023-10-27 NOTE — TELEPHONE ENCOUNTER
Pt lmom regarding  having a headache and  nausea . Pt is asking for fiorcet . I called returned pt call to get more information .

## 2023-10-27 NOTE — TELEPHONE ENCOUNTER
Patient tried tylenol and naproxen with no improvement.  Also mentioned to patient to call her neurologist to see if they can help with her headaches, she was just seen there on 10/25/23

## 2023-11-02 ENCOUNTER — TELEPHONE (OUTPATIENT)
Age: 63
End: 2023-11-02

## 2023-11-02 NOTE — TELEPHONE ENCOUNTER
Patient calling in to see if she can do the SIBO breath test when she has diarrhea. She will wait until her diarrhea slows down to do the testing. SIBO instructions sent to patient via [employer].

## 2023-11-03 DIAGNOSIS — I48.0 PAROXYSMAL ATRIAL FIBRILLATION (HCC): ICD-10-CM

## 2023-11-06 RX ORDER — APIXABAN 5 MG/1
TABLET, FILM COATED ORAL
Qty: 180 TABLET | Refills: 3 | Status: SHIPPED | OUTPATIENT
Start: 2023-11-06

## 2023-11-08 DIAGNOSIS — Z90.49 S/P SMALL BOWEL RESECTION: ICD-10-CM

## 2023-11-08 DIAGNOSIS — F41.9 ANXIETY: ICD-10-CM

## 2023-11-08 DIAGNOSIS — M54.16 LUMBAR RADICULOPATHY: ICD-10-CM

## 2023-11-09 RX ORDER — TRAMADOL HYDROCHLORIDE 50 MG/1
100 TABLET ORAL EVERY 8 HOURS PRN
Qty: 180 TABLET | Refills: 0 | Status: SHIPPED | OUTPATIENT
Start: 2023-11-09

## 2023-11-09 RX ORDER — LORAZEPAM 1 MG/1
1 TABLET ORAL 3 TIMES DAILY
Qty: 90 TABLET | Refills: 0 | Status: SHIPPED | OUTPATIENT
Start: 2023-11-09

## 2023-11-12 DIAGNOSIS — K21.9 GASTROESOPHAGEAL REFLUX DISEASE WITHOUT ESOPHAGITIS: ICD-10-CM

## 2023-11-13 RX ORDER — SUCRALFATE 1 G/1
TABLET ORAL
Qty: 60 TABLET | Refills: 0 | Status: SHIPPED | OUTPATIENT
Start: 2023-11-13

## 2023-11-16 ENCOUNTER — HOSPITAL ENCOUNTER (OUTPATIENT)
Dept: NEUROLOGY | Facility: CLINIC | Age: 63
End: 2023-11-16
Payer: COMMERCIAL

## 2023-11-16 ENCOUNTER — OFFICE VISIT (OUTPATIENT)
Dept: FAMILY MEDICINE CLINIC | Facility: CLINIC | Age: 63
End: 2023-11-16
Payer: COMMERCIAL

## 2023-11-16 ENCOUNTER — TELEPHONE (OUTPATIENT)
Dept: VASCULAR SURGERY | Facility: CLINIC | Age: 63
End: 2023-11-16

## 2023-11-16 VITALS
WEIGHT: 179 LBS | SYSTOLIC BLOOD PRESSURE: 110 MMHG | HEIGHT: 64 IN | TEMPERATURE: 98.1 F | BODY MASS INDEX: 30.56 KG/M2 | HEART RATE: 58 BPM | OXYGEN SATURATION: 95 % | DIASTOLIC BLOOD PRESSURE: 70 MMHG

## 2023-11-16 DIAGNOSIS — Z90.49 S/P SMALL BOWEL RESECTION: ICD-10-CM

## 2023-11-16 DIAGNOSIS — K55.9 COLONIC ISCHEMIA (HCC): Primary | ICD-10-CM

## 2023-11-16 DIAGNOSIS — K55.069 MESENTERIC ARTERY THROMBOSIS (HCC): ICD-10-CM

## 2023-11-16 DIAGNOSIS — R30.0 DYSURIA: ICD-10-CM

## 2023-11-16 DIAGNOSIS — R20.2 PARESTHESIA: ICD-10-CM

## 2023-11-16 DIAGNOSIS — I73.9 PERIPHERAL VASCULAR DISEASE (HCC): ICD-10-CM

## 2023-11-16 DIAGNOSIS — I10 ESSENTIAL HYPERTENSION: ICD-10-CM

## 2023-11-16 PROCEDURE — 99214 OFFICE O/P EST MOD 30 MIN: CPT | Performed by: FAMILY MEDICINE

## 2023-11-16 PROCEDURE — 95909 NRV CNDJ TST 5-6 STUDIES: CPT | Performed by: PSYCHIATRY & NEUROLOGY

## 2023-11-16 RX ORDER — CIPROFLOXACIN 500 MG/1
500 TABLET, FILM COATED ORAL EVERY 12 HOURS SCHEDULED
Qty: 14 TABLET | Refills: 0 | Status: SHIPPED | OUTPATIENT
Start: 2023-11-16 | End: 2023-11-23

## 2023-11-16 RX ADMIN — CYANOCOBALAMIN 1000 MCG: 1000 INJECTION, SOLUTION INTRAMUSCULAR; SUBCUTANEOUS at 11:51

## 2023-11-16 NOTE — TELEPHONE ENCOUNTER
Pt called the office stating that she feels like she is ready to discuss moving forward w/ L carotid intervention and she is asking if Dr. Deri Severs spoke w/ the [email or web address] rep regarding possible stent. Email sent to Dr. Deri Severs.

## 2023-11-16 NOTE — PROGRESS NOTES
Assessment/Plan: Labs are reviewed. Patient B12 injection at this time. Patient will use MiraLAX as directed for constipation. Patient unable to give UA C&S. Patient will start ciprofloxacin as directed. Patient will follow with all specialist appropriately. Diagnoses and all orders for this visit:    Colonic ischemia [facility])    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HCC)    Essential hypertension    Peripheral vascular disease ([address])    S/P small bowel resection    Dysuria  -     ciprofloxacin (CIPRO) 500 mg tablet; Take 1 tablet (500 mg total) by mouth every 12 (twelve) hours for 7 days            Subjective:        Patient ID: Annemarie Kumar is a 70 y.o. female. Patient is here to follow-up on mesenteric ischemia colonic ischemia peripheral vascular disease. Patient also with history of hypertension. Patient does notice some left-sided abdominal pain which is persisting. Some nausea but no vomiting. Patient does have some change in bowel habits consistent with constipation. Patient with dysuria as well as abnormal odor to the urine over the past 2 weeks or so. No fever. The following portions of the patient's history were reviewed and updated as appropriate: allergies, current medications, past family history, past medical history, past social history, past surgical history and problem list.      Review of Systems   Constitutional: Negative. HENT: Negative. Eyes: Negative. Respiratory: Negative. Cardiovascular: Negative. Gastrointestinal:  Positive for abdominal pain and constipation. Endocrine: Negative. Genitourinary:  Positive for dysuria and frequency. Musculoskeletal: Negative. Skin: Negative. Allergic/Immunologic: Negative. Neurological: Negative. Hematological: Negative. Psychiatric/Behavioral: Negative. Objective:        Depression Screening and Follow-up Plan: Patient was screened for depression during today's encounter.  They screened negative with a PHQ-2 score of 2.            /70   Pulse 58   Temp 98.1 °F (36.7 °C)   Ht 5' 4" (1.626 m)   Wt 81.2 kg (179 lb)   LMP  (LMP Unknown)   SpO2 95%   BMI 30.73 kg/m²          Physical Exam  Vitals and nursing note reviewed. Constitutional:       General: She is not in acute distress. Appearance: Normal appearance. She is not ill-appearing, toxic-appearing or diaphoretic. HENT:      Head: Normocephalic and atraumatic. Right Ear: Tympanic membrane, ear canal and external ear normal. There is no impacted cerumen. Left Ear: Tympanic membrane, ear canal and external ear normal. There is no impacted cerumen. Nose: Nose normal. No congestion or rhinorrhea. Mouth/Throat:      Mouth: Mucous membranes are moist.      Pharynx: No oropharyngeal exudate or posterior oropharyngeal erythema. Eyes:      General: No scleral icterus. Right eye: No discharge. Left eye: No discharge. Neck:      Vascular: No carotid bruit. Cardiovascular:      Rate and Rhythm: Normal rate and regular rhythm. Pulses: Normal pulses. Heart sounds: Normal heart sounds. No murmur heard. No friction rub. No gallop. Pulmonary:      Effort: Pulmonary effort is normal. No respiratory distress. Breath sounds: Normal breath sounds. No stridor. No wheezing, rhonchi or rales. Chest:      Chest wall: No tenderness. Abdominal:      Tenderness: There is abdominal tenderness. Musculoskeletal:         General: No swelling, tenderness, deformity or signs of injury. Normal range of motion. Cervical back: Normal range of motion and neck supple. No rigidity. No muscular tenderness. Right lower leg: No edema. Left lower leg: No edema. Lymphadenopathy:      Cervical: No cervical adenopathy. Skin:     General: Skin is warm and dry. Capillary Refill: Capillary refill takes less than 2 seconds. Coloration: Skin is not jaundiced.       Findings: No bruising, erythema, lesion or rash. Neurological:      Mental Status: She is alert and oriented to person, place, and time. Mental status is at baseline. Cranial Nerves: No cranial nerve deficit. Sensory: No sensory deficit. Motor: No weakness. Coordination: Coordination normal.      Gait: Gait normal.   Psychiatric:         Mood and Affect: Mood normal.         Behavior: Behavior normal.         Thought Content:  Thought content normal.         Judgment: Judgment normal.

## 2023-11-17 ENCOUNTER — APPOINTMENT (OUTPATIENT)
Dept: LAB | Facility: HOSPITAL | Age: 63
End: 2023-11-17
Payer: COMMERCIAL

## 2023-11-17 DIAGNOSIS — E87.6 HYPOKALEMIA: Primary | ICD-10-CM

## 2023-11-17 DIAGNOSIS — R61 NIGHT SWEATS: ICD-10-CM

## 2023-11-17 DIAGNOSIS — N39.0 RECURRENT UTI: ICD-10-CM

## 2023-11-17 DIAGNOSIS — E87.6 HYPOKALEMIA: ICD-10-CM

## 2023-11-17 DIAGNOSIS — I10 BENIGN ESSENTIAL HYPERTENSION: ICD-10-CM

## 2023-11-17 LAB
ALBUMIN SERPL BCP-MCNC: 4.8 G/DL (ref 3.5–5)
ALP SERPL-CCNC: 97 U/L (ref 34–104)
ALT SERPL W P-5'-P-CCNC: 16 U/L (ref 7–52)
ANION GAP SERPL CALCULATED.3IONS-SCNC: 8 MMOL/L
AST SERPL W P-5'-P-CCNC: 18 U/L (ref 13–39)
BACTERIA UR QL AUTO: ABNORMAL /HPF
BILIRUB SERPL-MCNC: 0.39 MG/DL (ref 0.2–1)
BILIRUB UR QL STRIP: NEGATIVE
BUN SERPL-MCNC: 20 MG/DL (ref 5–25)
CALCIUM SERPL-MCNC: 9.9 MG/DL (ref 8.4–10.2)
CHLORIDE SERPL-SCNC: 95 MMOL/L (ref 96–108)
CLARITY UR: CLEAR
CO2 SERPL-SCNC: 32 MMOL/L (ref 21–32)
COLOR UR: ABNORMAL
CREAT SERPL-MCNC: 1.02 MG/DL (ref 0.6–1.3)
GFR SERPL CREATININE-BSD FRML MDRD: 57 ML/MIN/1.73SQ M
GLUCOSE P FAST SERPL-MCNC: 109 MG/DL (ref 65–99)
GLUCOSE UR STRIP-MCNC: NEGATIVE MG/DL
HGB UR QL STRIP.AUTO: NEGATIVE
HYALINE CASTS #/AREA URNS LPF: ABNORMAL /LPF
KETONES UR STRIP-MCNC: NEGATIVE MG/DL
LEUKOCYTE ESTERASE UR QL STRIP: NEGATIVE
MAGNESIUM SERPL-MCNC: 2 MG/DL (ref 1.9–2.7)
MUCOUS THREADS UR QL AUTO: ABNORMAL
NITRITE UR QL STRIP: POSITIVE
NON-SQ EPI CELLS URNS QL MICRO: ABNORMAL /HPF
PH UR STRIP.AUTO: 5.5 [PH]
POTASSIUM SERPL-SCNC: 4.2 MMOL/L (ref 3.5–5.3)
PROT SERPL-MCNC: 7.6 G/DL (ref 6.4–8.4)
PROT UR STRIP-MCNC: NEGATIVE MG/DL
RBC #/AREA URNS AUTO: ABNORMAL /HPF
SODIUM SERPL-SCNC: 135 MMOL/L (ref 135–147)
SP GR UR STRIP.AUTO: 1.01 (ref 1–1.03)
T4 FREE SERPL-MCNC: 0.73 NG/DL (ref 0.61–1.12)
TSH SERPL DL<=0.05 MIU/L-ACNC: 4.83 UIU/ML (ref 0.45–4.5)
UROBILINOGEN UR STRIP-ACNC: <2 MG/DL
WBC #/AREA URNS AUTO: ABNORMAL /HPF

## 2023-11-17 PROCEDURE — 83835 ASSAY OF METANEPHRINES: CPT

## 2023-11-17 PROCEDURE — 83735 ASSAY OF MAGNESIUM: CPT

## 2023-11-17 RX ORDER — POTASSIUM CHLORIDE 750 MG/1
10 TABLET, FILM COATED, EXTENDED RELEASE ORAL 2 TIMES DAILY
Qty: 180 TABLET | Refills: 0 | Status: SHIPPED | OUTPATIENT
Start: 2023-11-17 | End: 2023-11-20 | Stop reason: SDUPTHER

## 2023-11-20 ENCOUNTER — TELEPHONE (OUTPATIENT)
Dept: UROLOGY | Facility: CLINIC | Age: 63
End: 2023-11-20

## 2023-11-20 DIAGNOSIS — E87.6 HYPOKALEMIA: ICD-10-CM

## 2023-11-20 RX ORDER — SPIRONOLACTONE 25 MG/1
TABLET ORAL
Qty: 180 TABLET | Refills: 1 | Status: SHIPPED | OUTPATIENT
Start: 2023-11-20

## 2023-11-20 RX ORDER — POTASSIUM CHLORIDE 750 MG/1
10 TABLET, FILM COATED, EXTENDED RELEASE ORAL 2 TIMES DAILY
Qty: 180 TABLET | Refills: 0 | Status: SHIPPED | OUTPATIENT
Start: 2023-11-20

## 2023-11-20 NOTE — TELEPHONE ENCOUNTER
----- Message from Zhen Wade MD sent at 11/20/2023 11:45 AM EST -----  Please let her know that her urine culture is essentially negative. Just less than 10,000 colonies of strep which is a skin infection.

## 2023-11-20 NOTE — TELEPHONE ENCOUNTER
Called patient and informed her that her urine culture came back negative for infection. Pt confirmed understanding and was thankful for the call.

## 2023-11-24 ENCOUNTER — TELEPHONE (OUTPATIENT)
Dept: VASCULAR SURGERY | Facility: CLINIC | Age: 63
End: 2023-11-24

## 2023-11-24 NOTE — TELEPHONE ENCOUNTER
Roberto García, DO  You2 days ago     CD  Can you please set up appt to discuss procedure    Maricarmen Gamble & Sonia

## 2023-11-30 ENCOUNTER — OFFICE VISIT (OUTPATIENT)
Dept: FAMILY MEDICINE CLINIC | Facility: CLINIC | Age: 63
End: 2023-11-30
Payer: COMMERCIAL

## 2023-11-30 ENCOUNTER — OFFICE VISIT (OUTPATIENT)
Dept: GASTROENTEROLOGY | Facility: CLINIC | Age: 63
End: 2023-11-30
Payer: COMMERCIAL

## 2023-11-30 VITALS
DIASTOLIC BLOOD PRESSURE: 68 MMHG | OXYGEN SATURATION: 98 % | TEMPERATURE: 97.6 F | HEART RATE: 61 BPM | WEIGHT: 180 LBS | BODY MASS INDEX: 30.73 KG/M2 | HEIGHT: 64 IN | SYSTOLIC BLOOD PRESSURE: 128 MMHG

## 2023-11-30 VITALS
HEART RATE: 73 BPM | OXYGEN SATURATION: 93 % | BODY MASS INDEX: 30.73 KG/M2 | HEIGHT: 64 IN | SYSTOLIC BLOOD PRESSURE: 119 MMHG | TEMPERATURE: 97.1 F | WEIGHT: 180 LBS | DIASTOLIC BLOOD PRESSURE: 75 MMHG

## 2023-11-30 DIAGNOSIS — K43.2 INCISIONAL HERNIA, WITHOUT OBSTRUCTION OR GANGRENE: Primary | ICD-10-CM

## 2023-11-30 DIAGNOSIS — K21.9 GASTROESOPHAGEAL REFLUX DISEASE WITHOUT ESOPHAGITIS: ICD-10-CM

## 2023-11-30 DIAGNOSIS — K21.9 GASTROESOPHAGEAL REFLUX DISEASE, UNSPECIFIED WHETHER ESOPHAGITIS PRESENT: Primary | ICD-10-CM

## 2023-11-30 DIAGNOSIS — K58.1 IRRITABLE BOWEL SYNDROME WITH CONSTIPATION: ICD-10-CM

## 2023-11-30 DIAGNOSIS — K55.9 COLONIC ISCHEMIA (HCC): ICD-10-CM

## 2023-11-30 DIAGNOSIS — K76.0 HEPATIC STEATOSIS: ICD-10-CM

## 2023-11-30 DIAGNOSIS — R30.0 DYSURIA: ICD-10-CM

## 2023-11-30 DIAGNOSIS — N18.30 STAGE 3 CHRONIC KIDNEY DISEASE, UNSPECIFIED WHETHER STAGE 3A OR 3B CKD (HCC): ICD-10-CM

## 2023-11-30 PROCEDURE — 99214 OFFICE O/P EST MOD 30 MIN: CPT | Performed by: FAMILY MEDICINE

## 2023-11-30 PROCEDURE — 99214 OFFICE O/P EST MOD 30 MIN: CPT | Performed by: PHYSICIAN ASSISTANT

## 2023-11-30 RX ORDER — ESOMEPRAZOLE MAGNESIUM 40 MG/1
40 CAPSULE, DELAYED RELEASE ORAL DAILY
Qty: 30 CAPSULE | Refills: 2 | Status: SHIPPED | OUTPATIENT
Start: 2023-11-30

## 2023-11-30 RX ORDER — CIPROFLOXACIN 500 MG/1
500 TABLET, FILM COATED ORAL EVERY 12 HOURS SCHEDULED
Qty: 14 TABLET | Refills: 0 | Status: SHIPPED | OUTPATIENT
Start: 2023-11-30 | End: 2023-12-07

## 2023-11-30 NOTE — PATIENT INSTRUCTIONS
GERD (Gastroesophageal Reflux Disease)   AMBULATORY CARE:   Gastroesophageal reflux disease (GERD)  is reflux that happens more than 2 times a week for a few weeks. Reflux means acid and food in your stomach back up into your esophagus. GERD can cause other health problems over time if it is not treated. Common causes of GERD:  GERD often happens because the lower muscle (sphincter) of the esophagus does not close properly. The sphincter normally opens to let food into the stomach. It then closes to keep food and stomach acid in the stomach. If the sphincter does not close properly, stomach acid and food back up (reflux) into the esophagus. The following may increase your risk for GERD:  Certain foods such as spicy foods, chocolate, foods that contain caffeine, peppermint, and fried foods    Hiatal hernia    Certain medicines such as calcium channel blockers (used to treat high blood pressure), allergy medicines, sedatives, or antidepressants    Pregnancy, obesity, or scleroderma    Lying down after a meal    Drinking alcohol or smoking cigarettes    Signs and symptoms:   Heartburn (burning pain in your chest)    Pain after meals that spreads to your neck, jaw, or shoulder    Pain that gets better when you change positions    Bitter or acid taste in your mouth    A dry cough    Trouble swallowing or pain with swallowing    Hoarseness or a sore throat    Burping or hiccups    Feeling full soon after you start eating    Call your local emergency number (911 in the [address]) if:   You have severe chest pain and sudden trouble breathing. Seek care immediately if:   You have trouble breathing after you vomit. You have trouble swallowing, or pain with swallowing. Your bowel movements are black, bloody, or tarry-looking. Your vomit looks like coffee grounds or has blood in it. Call your doctor or gastroenterologist if:   You feel full and cannot burp or vomit.     You vomit large amounts, or you vomit often.    You are losing weight without trying. Your symptoms get worse or do not improve with treatment. You have questions or concerns about your condition or care. Treatment for GERD:   Medicines  are used to decrease stomach acid. Medicine may also be used to help your lower esophageal sphincter and stomach contract (tighten) more. Surgery  is done to wrap the upper part of the stomach around the esophageal sphincter. This will strengthen the sphincter and prevent reflux. Manage GERD:       Do not have foods or drinks that may increase heartburn. These include chocolate, peppermint, fried or fatty foods, drinks that contain caffeine, or carbonated drinks (soda). Other foods include spicy foods, onions, tomatoes, and tomato-based foods. Do not have foods or drinks that can irritate your esophagus, such as citrus fruits, juices, and alcohol. Do not eat large meals. When you eat a lot of food at one time, your stomach needs more acid to digest it. Eat 6 small meals each day instead of 3 large meals, and eat slowly. Do not eat meals 2 to 3 hours before bedtime. Elevate the head of your bed. Place 6-inch blocks under the head of your bed frame. You may also use more than one pillow under your head and shoulders while you sleep. Maintain a healthy weight. If you are overweight, weight loss may help relieve symptoms of GERD. Do not smoke. Smoking weakens the lower esophageal sphincter and increases the risk of GERD. Ask your healthcare provider for information if you currently smoke and need help to quit. E-cigarettes or smokeless tobacco still contain nicotine. Talk to your healthcare provider before you use these products. Do not put pressure on your abdomen. Pressure pushes acid up into your esophagus. Do not wear clothing that is tight around your waist. Do not bend over. Bend at the knees if you need to pick something up.   Follow up with your doctor or gastroenterologist as directed:  Write down your questions so you remember to ask them during your visits. © Copyright [facility] 2023 Information is for End User's use only and may not be sold, redistributed or otherwise used for commercial purposes. The above information is an  only. It is not intended as medical advice for individual conditions or treatments. Talk to your doctor, nurse or pharmacist before following any medical regimen to see if it is safe and effective for you.

## 2023-11-30 NOTE — PROGRESS NOTES
[facility] - Outpatient Follow-up Note  Johana Diallo 70 y.o. female MRN: [id number]  Encounter: [id number]      Assessment and Plan    1. Ischemic colitis s/p resection   2. Mesenteric artery thrombosis   S/p SMA stent at [facility]. More recently had a hospital admission after experiencing hematochezia and was found to have extensive ischemic colitis prompting extended left hemicolectomy and primary anastomosis 7/19. Currently having pain with an associated tender bulge at the distal end of her midline incision  -CT vs U/S to r/o incisional hernia, will defer to Dr. Kelle Mehta as the patient would like to see him as well as scheduled later today      3. History of PUD  4. GERD  The patient has acid reflux which is poorly controlled on omeprazole 40 mg BID and OTC pepcid. Prior EGD 5/28/19 revealed multiple superficial ulcers in the antrum with negative H pylori testing. Etiology thought to be NSAIDs. Repeat EGD 4/7/22 normal including esophogeal biopsies  -Avoid NSAIDS   -Stop omeprazole 40mg and Start Nexium 40mg once daily  -Continue pepcid 20mg and as needed carafate   -Continue anti nausea meds, currently on as needed phenergan   -If continued symptoms despite the change to Nexium would recommend repeating EGD and also getting pH study      4. Fatty liver  Seen on CT scan 11/29/21. U/S elastography with S3 steatosis but no fibrosis. Last CMP 7/18/23 normal aside for a mildly elevated alk phos of 118  -Routine monitoring of his CMP  -Recommend healthy diet and routine exercise     5. IBS-C  The patient has a history of IBS-C currently well controlled on miralax and colace  -Continue miralax  -Continue Colace     Follow up 3 months     ______________________________________________________________________    History of Present Illness  Johana Diallo is a 70 y.o. female here for follow up evaluation of GERD and IBS-C.  The patient has acid reflux which is poorly controlled on omeprazole 40 mg BID and OTC pepcid. On this she is having 1-2 episodes of breakthrough symptoms daily. Prior EGD 5/28/19 revealed multiple superficial ulcers in the antrum with negative H pylori testing. Etiology thought to be NSAIDs. Repeat EGD 4/7/22 normal including esophogeal biopsies. As for her IBS this is well controlled with miralax and colace. On this she is having 1-2 bowel movements daily. Her only other complaint today is abdominal pain. She states it has been ongoing for about 1-2 weeks and it is associated with a tender bulge near her umbilicus. Review of Systems   Constitutional:  Negative for activity change, appetite change, chills, fatigue, fever and unexpected weight change. Gastrointestinal:  Positive for abdominal pain.        Past Medical History  Past Medical History:   Diagnosis Date    A-fib ([address]) 03/2020    Allergic     Anxiety     Arthritis     Asthma     Back pain     and muscle pain    Bruises easily     Chronic pain disorder     Interstitial pain    Colon polyp     Dental bridge present     Depression     Eczema     GERD (gastroesophageal reflux disease)     Heart murmur     History of blood clots     per pt "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"    History of pneumonia     Hives     Hyperlipidemia     Hypertension     Infertility, female     Interstitial cystitis     Migraine     sees neurologist    Motion sickness     Obesity     Palpitations     PONV (postoperative nausea and vomiting)     Premature atrial contractions 11/09/2022    Psychiatric disorder     TIA (transient ischemic attack) 09/2022    per pt --"had MRI and 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"    Urinary incontinence     wears Pads    Venous insufficiency 08/01/2017    Wears glasses        Past Social history  Past Surgical History:   Procedure Laterality Date    COLONOSCOPY      DILATION AND CURETTAGE OF UTERUS      EGD      EXPLORATORY LAPAROTOMY      for infertility    EXPLORATORY LAPAROTOMY W/ BOWEL RESECTION N/A 2023    Procedure: LAPAROTOMY EXPLORATORY W/ BOWEL RESECTION;  Surgeon: Chiqui Magallanes MD;  Location: [facility];  Service: General    HAND SURGERY      Hand excision of tendon cyst    MI LAPAROSCOPIC APPENDECTOMY N/A 2022    Procedure: APPENDECTOMY LAPAROSCOPIC;  Surgeon:  Jeremiah Dos Santos MD;  [facility]: [facility];  Service: General    MI LAPS ABD PRTM&OMENTUM DX W/WO SPEC BR/WA SPX N/A 2023    Procedure: LAPAROSCOPY DIAGNOSTIC, LYSIS OF ADHESIONS, LAPAROSCOPY TAKE TAKEDOWN OF LEFT COLON, EXPLORATORY LAPAROSCOPY, LYSIS OF ADHESIONS, RESECTION OF TRANSVERSE COLON SPLENIC FLEXURE AND DESCENDING COLON , TAKE DOWN OF SPLENIC FLEXURE, SIGMOIDOSCOPY, MOBILIZATION OF RIGHT COLON, PRIMARY ANASTOMOSIS EEA 29, TAP BLOCK;  Surgeon: Chiqui Magallanes MD;  Location[facility];  Service: General    MI TEAEC W/PATCH GRF CAROTID VERTB [address] Left 2023    Procedure: EXPLORATION OF LEFT CAROTID ARTERY; ABORTED ENDARTERECTOMY ARTERY CAROTID;  Surgeon: Ralph Bender DO;  Location: [facility];  Service: Vascular    SUPERIOR MESTENTERIC ARTERY STENT      WISDOM TOOTH EXTRACTION       Social History     Socioeconomic History    Marital status:      Spouse name: Not on file    Number of children: 0    Years of education: Not on file    Highest education level: Not on file   Occupational History    Occupation: retired   Tobacco Use    Smoking status: Former     Packs/day: 0.50     Years: 10.00     Total pack years: 5.00     Types: Cigarettes     Quit date: 2019     Years since quittin.9     Passive exposure: Never    Smokeless tobacco: Never   Vaping Use    Vaping Use: Never used   Substance and Sexual Activity    Alcohol use: Not Currently    Drug use: No    Sexual activity: Not Currently     Comment: defer   Other Topics Concern    Not on file   Social History Narrative    Who lives in your home: Alone     What type of home do you live in: Apartment     Age of your home: 1952 built How long have you been living there: 5 yrs     Type of heat: forced hot air     Type of fuel: electric     What type of jamin is in your bedroom: carpet jamin     Do you have the following in or near your home:    Air products: Humidifier in the bedroom/kitchen     Pests: none     Pets: none     Are pets allowed in bedroom: N/A     Open fields, wooded areas nearby: No     Basement: [id number] mold     Exposure to second hand smoke: No    Central air     Habits:    Caffeine: Hot tea 1 cup daily- ice tea 1 cup in the afternoon    Chocolate: Occasionally      Social Determinants of Health     Financial Resource Strain: Medium Risk (10/2/2023)    Overall Financial Resource Strain (CARDIA)     Difficulty of Paying Living Expenses: Somewhat hard   Food Insecurity: No Food Insecurity (2/21/2023)    Hunger Vital Sign     Worried About Running Out of Food in the Last Year: Never true     Ran Out of Food in the Last Year: Never true   Transportation Needs: No Transportation Needs (10/2/2023)    PRAPARE - Transportation     Lack of Transportation (Medical): No     Lack of Transportation (Non-Medical): No   Physical Activity: Inactive (5/24/2021)    Exercise Vital Sign     Days of Exercise per Week: 0 days     Minutes of Exercise per Session: 0 min   Stress: Not on file   Social Connections:  Moderately Isolated (5/24/2021)    Social Connection and Isolation Panel [NHANES]     Frequency of Communication with Friends and Family: More than three times a week     Frequency of Social Gatherings with Friends and Family: More than three times a week     Attends [employer] Services: 1 to 4 times per year     Active Member of [employer] or Organizations: No     Attends Club or Organization Meetings: Never     Marital Status: Never    Intimate Partner Violence: Not At Risk (5/24/2021)    Humiliation, Afraid, Rape, and Kick questionnaire     Fear of Current or Ex-Partner: No     Emotionally Abused: No     Physically Abused: No     Sexually Abused: No   Housing Stability: Low Risk  (2023)    Housing Stability Vital Sign     Unable to Pay for Housing in the Last Year: No     Number of Places Lived in the Last Year: 1     Unstable Housing in the Last Year: No     Social History     Substance and Sexual Activity   Alcohol Use Not Currently     Social History     Substance and Sexual Activity   Drug Use No     Social History     Tobacco Use   Smoking Status Former    Packs/day: 0.50    Years: 10.00    Total pack years: 5.00    Types: Cigarettes    Quit date:     Years since quittin.9    Passive exposure: Never   Smokeless Tobacco Never       Past Family History  Family History   Problem Relation Age of Onset    Lung cancer Mother 61    Brain cancer Mother 61    Diabetes Father     Depression Father     Other Father         septic    Allergies Sister     Hashimoto's thyroiditis Sister     Abdominal aortic aneurysm Sister     Diabetes Sister     No Known Problems Sister     No Known Problems Maternal Grandmother     No Known Problems Maternal Grandfather     No Known Problems Paternal Grandmother     No Known Problems Paternal Grandfather     Hodgkin's lymphoma Brother     No Known Problems Brother     No Known Problems Maternal Aunt     Diabetes Other        Current Medications  Current Outpatient Medications   Medication Sig Dispense Refill    acetaminophen (TYLENOL) 325 mg tablet Take 2 tablets (650 mg total) by mouth every 6 (six) hours  0    amiodarone 200 mg tablet TAKE ONE TABLET BY MOUTH ONCE DAILY 30 tablet 5    aspirin 81 mg chewable tablet Chew 1 tablet (81 mg total) daily  0    Atrovent HFA 17 MCG/ACT inhaler Inhale 2 puffs 4 (four) times a day as needed      Chelated Zinc 50 MG TABS Take 50 mg by mouth daily      Cholecalciferol 25 MCG (1000 UT) tablet Take 1,000 Units by mouth daily      dexamethasone (DECADRON) 4 mg tablet 1 tab PO with breakfast for 1 to 5 days for unrelenting migraine 5 tablet 0 diltiazem (CARDIZEM CD) 120 mg 24 hr capsule TAKE ONE CAPSULE BY MOUTH ONCE DAILY 30 capsule 3    Docusate Sodium (COLACE PO) Take by mouth daily at bedtime      Eliquis 5 MG TAKE ONE TABLET BY MOUTH TWICE DAILY 180 tablet 3    famotidine (PEPCID) 20 mg tablet Take 20 mg by mouth daily at bedtime      fexofenadine (ALLEGRA) 180 MG tablet Take 180 mg by mouth daily      fluticasone-vilanterol (BREO ELLIPTA) 200-25 MCG/INH inhaler Inhale 1 puff daily Rinse mouth after use. 3 Inhaler 3    furosemide (LASIX) 40 mg tablet Take 1 tablet (40 mg total) by mouth daily 90 tablet 0    hydrOXYzine HCL (ATARAX) 25 mg tablet TAKE TWO TABLETS BY MOUTH DAILY AT BEDTIME 90 tablet 0    LORazepam (ATIVAN) 1 mg tablet Take 1 tablet (1 mg total) by mouth 3 (three) times a day 90 tablet 0    magnesium Oxide (MAG-OX) 400 mg TABS Take 1 tablet (400 mg total) by mouth daily at bedtime 90 tablet 3    methocarbamol (ROBAXIN) 500 mg tablet TAKE ONE TABLET BY MOUTH THREE TIMES DAILY 90 tablet 1    metoprolol succinate (TOPROL-XL) 100 mg 24 hr tablet Take 1 tablet (100 mg total) by mouth 2 (two) times a day 180 tablet 0    montelukast (SINGULAIR) 10 mg tablet TAKE ONE TABLET BY MOUTH DAILY AT BEDTIME 90 tablet 0    naloxone (NARCAN) 4 mg/0.1 mL nasal spray Administer 1 spray into a nostril. If no response after 2-3 minutes, give another dose in the other nostril using a new spray.  1 each 1    omeprazole (PriLOSEC) 40 MG capsule Take 1 capsule (40 mg total) by mouth 2 (two) times a day before meals 60 capsule 2    phenazopyridine (PYRIDIUM) 200 mg tablet Take 1 tablet (200 mg total) by mouth if needed for bladder spasms 30 tablet 3    potassium chloride (Klor-Con) 10 mEq tablet Take 1 tablet (10 mEq total) by mouth 2 (two) times a day 180 tablet 0    promethazine (PHENERGAN) 25 mg tablet Take 1 tablet (25 mg total) by mouth every 6 (six) hours as needed for nausea or vomiting 60 tablet 5    Repatha SureClick 641 MG/ML SOAJ Inject 140mg under the skin every 2 weeks. 2 mL 11    spironolactone (ALDACTONE) 25 mg tablet TAKE ONE TABLET BY MOUTH TWICE DAILY 180 tablet 1    sucralfate (CARAFATE) 1 g tablet TAKE ONE TABLET BY MOUTH TWICE DAILY BEFORE MEALS 60 tablet 0    traMADol (ULTRAM) 50 mg tablet Take 2 tablets (100 mg total) by mouth every 8 (eight) hours as needed for moderate pain 180 tablet 0    Ascorbic Acid (vitamin C) 1000 MG tablet Take 1 tablet (1,000 mg total) by mouth daily 30 tablet 0    MULTIPLE VITAMIN PO Take by mouth (Patient not taking: Reported on 10/25/2023)       Current Facility-Administered Medications   Medication Dose Route Frequency Provider Last Rate Last Admin    cyanocobalamin injection 1,000 mcg  1,000 mcg Intramuscular Q30 Days Lorelei Sang, DO   1,000 mcg at 11/16/23 1151       Allergies  Allergies   Allergen Reactions    Ace Inhibitors Angioedema and Anaphylaxis     Anaphylaxis    Benicar [Olmesartan] Angioedema     See Allergy note from 9/11/2008. Swollen ankles/legs    Hydralazine Other (See Comments)     Lip swelling  Flank pain    Other Anaphylaxis and Other (See Comments)     Other reaction(s): angioadema  Other reaction(s): Other (See Comments)  Preservatives- itching throat closes, hi  Other reaction(s): angioadema  E Z Cat - hives throat closes itching,  Preservatives- itching throat closes, hi  Artificial sweeteners    Valsartan Angioedema     Lips, face swollen    Sulfa Antibiotics Hives     stuffiness,itching,hives,throat closing--per pt "sometimes able to take depending on the brand and the filler or possibly preservatives in it"    Ampicillin Hives     Depends on brand some preservatives can react. Has recently tolerated oral amoxicillin.     Motrin [Ibuprofen] Other (See Comments)     Per pt " told not to take due to A Fib"    Wound Dressing Adhesive Other (See Comments)     Per pt Adhesive on EKG leads caused redness         The following portions of the patient's history were reviewed and updated as appropriate: allergies, current medications, past medical history, past social history, past surgical history and problem list.      Vitals  Vitals:    11/30/23 1352   BP: 119/75   BP Location: Left arm   Patient Position: Sitting   Cuff Size: Standard   Pulse: 73   Temp: (!) 97.1 °F (36.2 °C)   TempSrc: Tympanic   SpO2: 93%   Weight: 81.6 kg (180 lb)   Height: 5' 4" (1.626 m)         Physical Exam  Constitutional   General appearance: Patient is seated and in no acute distress, well appearing and well nourished. Head and Face   Head and face: Normal.    Eyes   Conjunctiva and lids: No erythema, swelling or discharge. Anicteric. Ears, Nose, Mouth, and Throat   Hearing: Normal.    Neck: Supple, trachea midline. Pulmonary   Respiratory effort: No increased work of breathing or signs of respiratory distress. Cardiovascular   Examination of extremities for edema and/or varicosities: Normal.    Abdomen   Abdomen: Soft, tender bulge inferior midline incision   Musculoskeletal   Gait and station: Normal   Skin   Skin and subcutaneous tissue: Warm, dry, and intact. No visible jaundice, lesions or rashes. Psychiatric   Judgment and insight: Normal  Recent and remote memory:  Normal  Mood and affect: Normal      Results  No visits with results within 1 Day(s) from this visit. Latest known visit with results is:   Appointment on 11/17/2023   Component Date Value    Magnesium 11/17/2023 2.0        Radiology Results  No results found. Orders  No orders of the defined types were placed in this encounter.

## 2023-11-30 NOTE — PROGRESS NOTES
Assessment/Plan: Patient was try to get UA C&S. Patient will start Cipro as directed. Patient will have CAT scan of the abdomen and pelvis and will be referred to Dr. Cody Arguelles general surgeon for evaluation. Guidance given regarding chronic kidney disease stage III. Diagnoses and all orders for this visit:    Incisional hernia, without obstruction or gangrene  -     CT abdomen pelvis w contrast; Future  -     Ambulatory Referral to General Surgery; Future    Dysuria  -     ciprofloxacin (CIPRO) 500 mg tablet; Take 1 tablet (500 mg total) by mouth every 12 (twelve) hours for 7 days    Stage 3 chronic kidney disease, unspecified whether stage 3a or 3b CKD ([facility])            Subjective:        Patient ID: Ruthy Ibrahim is a 70 y.o. female. Patient with abdominal pain and lump left of the umbilicus over the past week or so. Patient with dysuria. Patient with some difficulty with urination. No gross hematuria. No fever. Patient does have bowel movements on a daily basis. Patient with nausea but no vomiting. Difficulty Urinating   Associated symptoms include nausea. Pertinent negatives include no vomiting. The following portions of the patient's history were reviewed and updated as appropriate: allergies, current medications, past family history, past medical history, past social history, past surgical history and problem list.      Review of Systems   Constitutional: Negative. HENT: Negative. Eyes: Negative. Respiratory: Negative. Cardiovascular: Negative. Gastrointestinal:  Positive for abdominal pain and nausea. Negative for rectal pain and vomiting. Endocrine: Negative. Genitourinary:  Positive for difficulty urinating and dysuria. Musculoskeletal: Negative. Skin: Negative. Allergic/Immunologic: Negative. Neurological: Negative. Hematological: Negative. Psychiatric/Behavioral: Negative.              Objective:               /68 (BP Location: Right arm, Patient Position: Sitting, Cuff Size: Standard)   Pulse 61   Temp 97.6 °F (36.4 °C) (Temporal)   Ht 5' 4" (1.626 m)   Wt 81.6 kg (180 lb)   LMP  (LMP Unknown)   SpO2 98%   BMI 30.90 kg/m²          Physical Exam  Vitals and nursing note reviewed. Constitutional:       General: She is not in acute distress. Appearance: Normal appearance. She is not ill-appearing, toxic-appearing or diaphoretic. HENT:      Head: Normocephalic and atraumatic. Right Ear: Tympanic membrane, ear canal and external ear normal. There is no impacted cerumen. Left Ear: Tympanic membrane, ear canal and external ear normal. There is no impacted cerumen. Nose: Nose normal. No congestion or rhinorrhea. Mouth/Throat:      Mouth: Mucous membranes are moist.      Pharynx: No oropharyngeal exudate or posterior oropharyngeal erythema. Neck:      Vascular: No carotid bruit. Cardiovascular:      Rate and Rhythm: Normal rate and regular rhythm. Pulses: Normal pulses. Heart sounds: Normal heart sounds. No murmur heard. No friction rub. No gallop. Pulmonary:      Effort: Pulmonary effort is normal. No respiratory distress. Breath sounds: Normal breath sounds. No stridor. No wheezing, rhonchi or rales. Chest:      Chest wall: No tenderness. Abdominal:      General: Abdomen is flat. Bowel sounds are normal. There is no distension. Palpations: Abdomen is soft. There is no mass. Tenderness: There is abdominal tenderness. There is no guarding or rebound. Hernia: A hernia is present. Comments: Possible hernia left of incision left of umbilicus. Musculoskeletal:         General: No swelling, tenderness, deformity or signs of injury. Normal range of motion. Cervical back: Normal range of motion and neck supple. No rigidity. No muscular tenderness. Right lower leg: No edema. Left lower leg: No edema.    Lymphadenopathy:      Cervical: No cervical adenopathy. Skin:     General: Skin is warm and dry. Capillary Refill: Capillary refill takes less than 2 seconds. Coloration: Skin is not jaundiced. Findings: No bruising, erythema, lesion or rash. Neurological:      General: No focal deficit present. Mental Status: She is alert and oriented to person, place, and time. Cranial Nerves: No cranial nerve deficit. Sensory: No sensory deficit. Motor: No weakness. Coordination: Coordination normal.      Gait: Gait normal.   Psychiatric:         Mood and Affect: Mood normal.         Behavior: Behavior normal.         Thought Content:  Thought content normal.         Judgment: Judgment normal.

## 2023-12-01 LAB
BILIRUB UR QL STRIP: NEGATIVE
CLARITY UR: CLEAR
COLOR UR: YELLOW
GLUCOSE UR STRIP-MCNC: NEGATIVE MG/DL
HGB UR QL STRIP.AUTO: NEGATIVE
KETONES UR STRIP-MCNC: NEGATIVE MG/DL
LEUKOCYTE ESTERASE UR QL STRIP: NEGATIVE
METANEPH FREE SERPL-MCNC: 38.5 PG/ML (ref 0–88)
NITRITE UR QL STRIP: NEGATIVE
NORMETANEPHRINE SERPL-MCNC: 165.4 PG/ML (ref 0–285.2)
PH UR STRIP.AUTO: 6 [PH]
PROT UR STRIP-MCNC: NEGATIVE MG/DL
SP GR UR STRIP.AUTO: 1.02 (ref 1–1.03)
UROBILINOGEN UR STRIP-ACNC: <2 MG/DL

## 2023-12-01 PROCEDURE — 87186 SC STD MICRODIL/AGAR DIL: CPT | Performed by: FAMILY MEDICINE

## 2023-12-01 PROCEDURE — 87077 CULTURE AEROBIC IDENTIFY: CPT | Performed by: FAMILY MEDICINE

## 2023-12-01 PROCEDURE — 87086 URINE CULTURE/COLONY COUNT: CPT | Performed by: FAMILY MEDICINE

## 2023-12-01 PROCEDURE — 81003 URINALYSIS AUTO W/O SCOPE: CPT | Performed by: FAMILY MEDICINE

## 2023-12-03 LAB — BACTERIA UR CULT: ABNORMAL

## 2023-12-04 DIAGNOSIS — R30.0 DYSURIA: Primary | ICD-10-CM

## 2023-12-04 LAB
BACTERIA UR CULT: ABNORMAL
BACTERIA UR CULT: ABNORMAL

## 2023-12-04 RX ORDER — NITROFURANTOIN 25; 75 MG/1; MG/1
100 CAPSULE ORAL 2 TIMES DAILY
Qty: 14 CAPSULE | Refills: 0 | Status: SHIPPED | OUTPATIENT
Start: 2023-12-04 | End: 2023-12-11

## 2023-12-04 NOTE — RESULT ENCOUNTER NOTE
Called and spoke with patient and advised her of lab results and medication change. Patient has full understanding and agrees. No further concerns mentioned.

## 2023-12-05 DIAGNOSIS — M54.16 LUMBAR RADICULOPATHY: ICD-10-CM

## 2023-12-05 DIAGNOSIS — Z90.49 S/P SMALL BOWEL RESECTION: ICD-10-CM

## 2023-12-05 DIAGNOSIS — F41.9 ANXIETY: ICD-10-CM

## 2023-12-06 ENCOUNTER — TELEPHONE (OUTPATIENT)
Dept: FAMILY MEDICINE CLINIC | Facility: CLINIC | Age: 63
End: 2023-12-06

## 2023-12-06 ENCOUNTER — PATIENT OUTREACH (OUTPATIENT)
Dept: FAMILY MEDICINE CLINIC | Facility: CLINIC | Age: 63
End: 2023-12-06

## 2023-12-06 DIAGNOSIS — Z78.9 NEED FOR FOLLOW-UP BY SOCIAL WORKER: Primary | ICD-10-CM

## 2023-12-06 RX ORDER — TRAMADOL HYDROCHLORIDE 50 MG/1
100 TABLET ORAL EVERY 8 HOURS PRN
Qty: 180 TABLET | Refills: 0 | Status: SHIPPED | OUTPATIENT
Start: 2023-12-06

## 2023-12-06 RX ORDER — LORAZEPAM 1 MG/1
1 TABLET ORAL 3 TIMES DAILY
Qty: 90 TABLET | Refills: 0 | Status: SHIPPED | OUTPATIENT
Start: 2023-12-06

## 2023-12-06 NOTE — TELEPHONE ENCOUNTER
PT unable to have CT scan tomorrow due to ins issue. I asked pt to cancel appt and take the next appt they have. Sometimes they will give the OK in the 11th hr but no guarantees. We will keep an eye on this, however she did not do the prep.   If pain is that bad, will have to go to ED

## 2023-12-07 ENCOUNTER — PATIENT OUTREACH (OUTPATIENT)
Dept: FAMILY MEDICINE CLINIC | Facility: CLINIC | Age: 63
End: 2023-12-07

## 2023-12-07 DIAGNOSIS — K21.9 GASTROESOPHAGEAL REFLUX DISEASE WITHOUT ESOPHAGITIS: Primary | ICD-10-CM

## 2023-12-07 NOTE — PROGRESS NOTES
NELLY DEE received referral regarding having financial difficulties/inability to pay rent. NELLY DEE noted in chart patient sometimes has anxiety ("good days and bad days"). NELLY CM placed call to the patient, Ferguson Saver and left message. NELLY CM will await return call to provide resources and psychosocial support as needed.

## 2023-12-07 NOTE — TELEPHONE ENCOUNTER
Hi, it's Faith Manjarrez. YOB: 1960 and I just got a call from the insurance part. It's still pending with the insurance part, so I have to cancel out tomorrow and she scheduled me for next Thursday. But if it gets bad, I'll just go down to the SO. Well, thank you so much for your help. I appreciate it. Have a good night.

## 2023-12-08 ENCOUNTER — TELEPHONE (OUTPATIENT)
Dept: SURGERY | Facility: CLINIC | Age: 63
End: 2023-12-08

## 2023-12-08 DIAGNOSIS — I10 ESSENTIAL HYPERTENSION: ICD-10-CM

## 2023-12-08 RX ORDER — CHLORDIAZEPOXIDE HYDROCHLORIDE AND CLIDINIUM BROMIDE 5; 2.5 MG/1; MG/1
CAPSULE ORAL
Qty: 120 CAPSULE | Refills: 0 | Status: SHIPPED | OUTPATIENT
Start: 2023-12-08

## 2023-12-08 NOTE — TELEPHONE ENCOUNTER
Dr Rafaela Gregory said if we need to do a peer to peer or anything else to have the 12/14 Ctscan approved, she would help with this. Maybe Gertrude Garcia as well. Pt said the 12/14 [facility] has been rescheduled twice due to not being to have it prior auth'd, and Dr doesn't want to have the 12/14 missed if we can help with this.

## 2023-12-11 ENCOUNTER — TELEPHONE (OUTPATIENT)
Dept: FAMILY MEDICINE CLINIC | Facility: CLINIC | Age: 63
End: 2023-12-11

## 2023-12-11 RX ORDER — METOPROLOL SUCCINATE 100 MG/1
100 TABLET, EXTENDED RELEASE ORAL 2 TIMES DAILY
Qty: 180 TABLET | Refills: 1 | Status: SHIPPED | OUTPATIENT
Start: 2023-12-11

## 2023-12-11 NOTE — TELEPHONE ENCOUNTER
Pt's PCP, Dr Luis Burks, ordered the CT abdomen pelvis w/contast, and his "Pre-Encounter [facility]", per [facility] in the office, said that group does the pre-authns for them and are working on it. She said they will update the pt on the timing since the [address] is scheduled for 12/14/23.

## 2023-12-11 NOTE — PROGRESS NOTES
Assessment/Plan:   Nancy Jones is a 63 y.o.female who comes in today for incisional hernias.    PMH: CKD, a-fib with Eliquis and 81mg ASA, HTN, emphysema, Stage 3 CKD, mesenteric thrombosis, CAMILLE, congestive heart failure    Plan:  Robotic multiple incisional epigastric and umbilical hernia repair with mesh possible open. Patient has many smaller epigastric hernias and a larger umbilical hernia with bowel involvement. These area all incisional hernias from her laparotomy.  We discussed she could have a lot of adhesions from prior surgery that could complicate this surgery and we may need to open. She understands. We did discuss symptoms of strangulation/incarceration of hernia and when to go to ER.    Patient needs medical and cardiac clearance with anticoagulation recommendations.         CT 12/14/23:  New left periumbilical widemouthed ventral abdominal wall hernia containing several loops of nonobstructed small bowel with opening of 7 cm.  Several smaller fat-containing midline ventral abdominal wall hernias.  Improved findings of omental infarct in the right upper quadrant with minimal residual stranding identified.    ______________________________________________________  HPI:  Nancy Jones is a 63 y.o.female who comes in today for postoperative check after recent recent laparotomy.         Patient today states she is having pain left of her umbilicus and a hard lump. She states she noticed this about 3-4 weeks ago. The lump is hard. She states she takes mirilax normally for constipation which helps keeps her bowel regulated. Denies nausea, vomiting, fevers, and blood in stool. Patient states she Has been doing well other then the hernias and the associated abdominal pain. She did have CT 12/14/23.    History of LAPAROTOMY EXPLORATORY W/ BOWEL RESECTION. LAPAROSCOPY DIAGNOSTIC. LYSIS OF ADHESIONS. LAPARASCOPY TAKE TAKEDOWN OF LEFT COLON.  EXPLORATORY LAPAROSCOPY LYSIS OF ADHESIONS. RESECTION TRANSVERSE COLON  , SPLENIC FLEXURE, . AND DESCENDING COLON . TAKE DOWN OF SPLENIC FLEXURE. SIGMOIDOSCOPY.MOBILIZATION OF RIGHT COLON. PRIMARY ANASTOMOSIS EEA 29. TAP BLOCK on 7/19/23 with Dr. Recinos.     ROS:  General ROS: negative for - chills, fatigue, fever or night sweats, weight loss  Respiratory ROS: no cough, shortness of breath, or wheezing  Cardiovascular ROS: no chest pain or dyspnea on exertion  Genito-Urinary ROS: no dysuria, trouble voiding, or hematuria  Musculoskeletal ROS: negative for - gait disturbance, joint pain or muscle pain  Neurological ROS: no TIA or stroke symptoms  GI ROS: see HPI  Skin ROS: no new rashes or lesions   Lymphatic ROS: no new adenopathy noted by pt.   GYN ROS: see HPI, no new GYN history or bleeding noted  Psy ROS: no new mental or behavioral disturbances         Patient Active Problem List   Diagnosis    Angina pectoris (HCC)    Essential hypertension    Migraines    AMD (age-related macular degeneration), bilateral    Allergic reaction    Gastroesophageal reflux disease without esophagitis    Allergic conjunctivitis of both eyes    Angio-edema    Paroxysmal atrial fibrillation (HCC)    Lumbar radiculopathy    CAMILLE (obstructive sleep apnea)    Hypertensive heart disease with congestive heart failure (HCC)    Unspecified diastolic (congestive) heart failure (HCC)    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HCC)    COPD (chronic obstructive pulmonary disease) (HCC)    Urinary retention    Peripheral vascular disease (HCC)    Appendix disease    Carotid artery stenosis    Stenosis of left carotid artery    Asymptomatic stenosis of left carotid artery    Hepatic steatosis    Partial facial nerve palsy    Injury of left facial nerve    Paresthesia    Colitis    Chronic migraine w/o aura w/o status migrainosus, not intractable    IIH (idiopathic intracranial hypertension)    Hematochezia    Left carotid stenosis    Colonic ischemia (HCC)    Acute postoperative pain    S/P small bowel resection    Anxiety     Omental infarction (HCC)    Hypokalemia    Night sweats    Dysuria    Incisional hernia, without obstruction or gangrene    Stage 3 chronic kidney disease, unspecified whether stage 3a or 3b CKD (HCC)       Allergies:  Ace inhibitors, Benicar [olmesartan], Hydralazine, Other, Valsartan, Sulfa antibiotics, Ampicillin, Motrin [ibuprofen], and Wound dressing adhesive      Current Outpatient Medications:     acetaminophen (TYLENOL) 325 mg tablet, Take 2 tablets (650 mg total) by mouth every 6 (six) hours, Disp: , Rfl: 0    amiodarone 200 mg tablet, TAKE ONE TABLET BY MOUTH ONCE DAILY, Disp: 30 tablet, Rfl: 5    Ascorbic Acid (vitamin C) 1000 MG tablet, Take 1 tablet (1,000 mg total) by mouth daily, Disp: 30 tablet, Rfl: 0    aspirin 81 mg chewable tablet, Chew 1 tablet (81 mg total) daily, Disp: , Rfl: 0    Atrovent HFA 17 MCG/ACT inhaler, Inhale 2 puffs 4 (four) times a day as needed, Disp: , Rfl:     Chelated Zinc 50 MG TABS, Take 50 mg by mouth daily, Disp: , Rfl:     chlordiazepoxide-clidinium (LIBRAX) 5-2.5 mg per capsule, TAKE ONE CAPSULE BY MOUTH FOUR TIMES DAILY AND ONE CAPSULE BEFORE BEDTIME., Disp: 120 capsule, Rfl: 0    Cholecalciferol 25 MCG (1000 UT) tablet, Take 1,000 Units by mouth daily, Disp: , Rfl:     dexamethasone (DECADRON) 4 mg tablet, 1 tab PO with breakfast for 1 to 5 days for unrelenting migraine, Disp: 5 tablet, Rfl: 0    diltiazem (CARDIZEM CD) 120 mg 24 hr capsule, TAKE ONE CAPSULE BY MOUTH ONCE DAILY, Disp: 30 capsule, Rfl: 3    Docusate Sodium (COLACE PO), Take by mouth daily at bedtime, Disp: , Rfl:     Eliquis 5 MG, TAKE ONE TABLET BY MOUTH TWICE DAILY, Disp: 180 tablet, Rfl: 3    esomeprazole (NexIUM) 40 MG capsule, Take 1 capsule (40 mg total) by mouth in the morning, Disp: 30 capsule, Rfl: 2    famotidine (PEPCID) 20 mg tablet, Take 20 mg by mouth daily at bedtime, Disp: , Rfl:     fluticasone-vilanterol (BREO ELLIPTA) 200-25 MCG/INH inhaler, Inhale 1 puff daily Rinse mouth after use.,  Disp: 3 Inhaler, Rfl: 3    furosemide (LASIX) 40 mg tablet, Take 1 tablet (40 mg total) by mouth daily, Disp: 90 tablet, Rfl: 0    hydrOXYzine HCL (ATARAX) 25 mg tablet, TAKE TWO TABLETS BY MOUTH DAILY AT BEDTIME, Disp: 90 tablet, Rfl: 0    LORazepam (ATIVAN) 1 mg tablet, Take 1 tablet (1 mg total) by mouth 3 (three) times a day, Disp: 90 tablet, Rfl: 0    magnesium Oxide (MAG-OX) 400 mg TABS, Take 1 tablet (400 mg total) by mouth daily at bedtime, Disp: 90 tablet, Rfl: 3    methocarbamol (ROBAXIN) 500 mg tablet, TAKE ONE TABLET BY MOUTH THREE TIMES DAILY, Disp: 90 tablet, Rfl: 1    metoprolol succinate (TOPROL-XL) 100 mg 24 hr tablet, TAKE ONE TABLET BY MOUTH TWICE DAILY, Disp: 180 tablet, Rfl: 1    montelukast (SINGULAIR) 10 mg tablet, TAKE ONE TABLET BY MOUTH DAILY AT BEDTIME, Disp: 90 tablet, Rfl: 0    MULTIPLE VITAMIN PO, Take by mouth, Disp: , Rfl:     phenazopyridine (PYRIDIUM) 200 mg tablet, Take 1 tablet (200 mg total) by mouth if needed for bladder spasms, Disp: 30 tablet, Rfl: 3    potassium chloride (Klor-Con) 10 mEq tablet, Take 1 tablet (10 mEq total) by mouth 2 (two) times a day, Disp: 180 tablet, Rfl: 0    promethazine (PHENERGAN) 25 mg tablet, Take 1 tablet (25 mg total) by mouth every 6 (six) hours as needed for nausea or vomiting, Disp: 60 tablet, Rfl: 5    Repatha SureClick 140 MG/ML SOAJ, Inject 140mg under the skin every 2 weeks., Disp: 2 mL, Rfl: 11    spironolactone (ALDACTONE) 25 mg tablet, TAKE ONE TABLET BY MOUTH TWICE DAILY, Disp: 180 tablet, Rfl: 1    sucralfate (CARAFATE) 1 g tablet, TAKE ONE TABLET BY MOUTH TWICE DAILY BEFORE MEALS, Disp: 60 tablet, Rfl: 0    traMADol (ULTRAM) 50 mg tablet, Take 2 tablets (100 mg total) by mouth every 8 (eight) hours as needed for moderate pain, Disp: 180 tablet, Rfl: 0    fexofenadine (ALLEGRA) 180 MG tablet, Take 180 mg by mouth daily, Disp: , Rfl:     naloxone (NARCAN) 4 mg/0.1 mL nasal spray, Administer 1 spray into a nostril. If no response after  "2-3 minutes, give another dose in the other nostril using a new spray. (Patient not taking: Reported on 12/21/2023), Disp: 1 each, Rfl: 1    Current Facility-Administered Medications:     cyanocobalamin injection 1,000 mcg, 1,000 mcg, Intramuscular, Q30 Days, Coy Ayoub DO, 1,000 mcg at 11/16/23 1151    Past Medical History:   Diagnosis Date    A-fib (HCC) 03/2020    Allergic     Anxiety     Arthritis     Asthma     Back pain     and muscle pain    Bruises easily     Chronic pain disorder     Interstitial pain    Colon polyp     Dental bridge present     Depression     Eczema     GERD (gastroesophageal reflux disease)     Heart murmur     History of blood clots     per pt \"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\"    History of pneumonia     Hives     Hyperlipidemia     Hypertension     Infertility, female     Interstitial cystitis     Migraine     sees neurologist    Motion sickness     Obesity     Palpitations     PONV (postoperative nausea and vomiting)     Premature atrial contractions 11/09/2022    Psychiatric disorder     TIA (transient ischemic attack) 09/2022    per pt --\"had MRI and 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\"    Urinary incontinence     wears Pads    Venous insufficiency 08/01/2017    Wears glasses        Past Surgical History:   Procedure Laterality Date    COLONOSCOPY      DILATION AND CURETTAGE OF UTERUS      EGD      EXPLORATORY LAPAROTOMY      for infertility    EXPLORATORY LAPAROTOMY W/ BOWEL RESECTION N/A 7/19/2023    Procedure: LAPAROTOMY EXPLORATORY W/ BOWEL RESECTION;  Surgeon: Crista Recinos MD;  Location: [facility];  Service: General    HAND SURGERY      Hand excision of tendon cyst    [place] LAPAROSCOPIC APPENDECTOMY N/A 05/03/2022    Procedure: APPENDECTOMY LAPAROSCOPIC;  Surgeon: Vin Fields MD;  Location: [facility];  Service: General    [place] LAPS ABD PRTM&OMENTUM DX W/WO SPEC BR/WA SPX N/A 7/19/2023    Procedure: LAPAROSCOPY DIAGNOSTIC, LYSIS OF ADHESIONS, " LAPAROSCOPY TAKE TAKEDOWN OF LEFT COLON, EXPLORATORY LAPAROSCOPY, LYSIS OF ADHESIONS, RESECTION OF TRANSVERSE COLON SPLENIC FLEXURE AND DESCENDING COLON , TAKE DOWN OF SPLENIC FLEXURE, SIGMOIDOSCOPY, MOBILIZATION OF RIGHT COLON, PRIMARY ANASTOMOSIS EEA 29, TAP BLOCK;  Surgeon: Crista Recinos MD;  Location: [facility];  Service: General    VT TEAEC W/PATCH GRF CAROTID VERTB SUBCLAV NECK INC Left 1/31/2023    Procedure: EXPLORATION OF LEFT CAROTID ARTERY; ABORTED ENDARTERECTOMY ARTERY CAROTID;  Surgeon: Roberto García DO;  Location: [facility];  Service: Vascular    SUPERIOR MESTENTERIC ARTERY STENT      WISDOM TOOTH EXTRACTION         Family History   Problem Relation Age of Onset    Lung cancer Mother 60    Brain cancer Mother 60    Diabetes Father     Depression Father     Other Father         septic    Allergies Sister     Hashimoto's thyroiditis Sister     Abdominal aortic aneurysm Sister     Diabetes Sister     No Known Problems Sister     No Known Problems Maternal Grandmother     No Known Problems Maternal Grandfather     No Known Problems Paternal Grandmother     No Known Problems Paternal Grandfather     Hodgkin's lymphoma Brother     No Known Problems Brother     No Known Problems Maternal Aunt     Diabetes Other         reports that she quit smoking about 4 years ago. Her smoking use included cigarettes. She started smoking about 14 years ago. She has a 5.0 pack-year smoking history. She has never been exposed to tobacco smoke. She has never used smokeless tobacco. She reports that she does not currently use alcohol. She reports that she does not use drugs.    Vitals:    12/21/23 1132   BP: 128/70   Pulse: 63   Resp: 16   Temp: (!) 96.9 °F (36.1 °C)   SpO2: 97%         PHYSICAL EXAM  General: normal, cooperative, no distress  Abdominal: soft, nondistended or non-tender There are very small epigastric hernias easily reduced and a umbilical /periumbilical to the left hernia that is not reducible at  today's visit.     Lungs clear to auscultation  Heart  normal sinus rhythm  Ambulatory without obvious musculoskeletal defect  Neurological exam grossly intact  Skin without jaundice.        Rolanda Clancy PA-C    Date: 12/21/2023 Time: 12:44 PM

## 2023-12-11 NOTE — TELEPHONE ENCOUNTER
Left message for patient to let her know that the prior authorization for her CT abdomen pelvis w contrast is pending and we just have to wait to hear back from the insurance.

## 2023-12-13 ENCOUNTER — PATIENT OUTREACH (OUTPATIENT)
Dept: FAMILY MEDICINE CLINIC | Facility: CLINIC | Age: 63
End: 2023-12-13

## 2023-12-13 ENCOUNTER — TELEPHONE (OUTPATIENT)
Dept: FAMILY MEDICINE CLINIC | Facility: CLINIC | Age: 63
End: 2023-12-13

## 2023-12-13 NOTE — PROGRESS NOTES
NELLY DEE placed second call to the patient, William Delvalle and left additional message. NELLY DEE sent Unable to Reach letter via [employer] and will close referral due to no response. NELLY DEE will remain available for psychosocial support as needed.

## 2023-12-13 NOTE — LETTER
12/13/23    Dear Jennifer Hdz,    I am a Care Manager with [address] [address]. We have made several attempts to call you by phone. It is important that you contact us back at [phone] so that we can assist with your care needs.      Sincerely,         Mckay Benedict  Social Work Care Manager

## 2023-12-14 ENCOUNTER — HOSPITAL ENCOUNTER (OUTPATIENT)
Dept: RADIOLOGY | Age: 63
Discharge: HOME | End: 2023-12-14
Payer: COMMERCIAL

## 2023-12-14 DIAGNOSIS — K43.2 INCISIONAL HERNIA, WITHOUT OBSTRUCTION OR GANGRENE: ICD-10-CM

## 2023-12-14 PROCEDURE — 74177 CT ABD & PELVIS W/CONTRAST: CPT

## 2023-12-14 PROCEDURE — G1004 CDSM NDSC: HCPCS

## 2023-12-14 RX ADMIN — IOHEXOL 100 ML: 350 INJECTION, SOLUTION INTRAVENOUS at 11:18

## 2023-12-17 DIAGNOSIS — K21.9 GASTROESOPHAGEAL REFLUX DISEASE WITHOUT ESOPHAGITIS: ICD-10-CM

## 2023-12-18 ENCOUNTER — PATIENT OUTREACH (OUTPATIENT)
Dept: FAMILY MEDICINE CLINIC | Facility: CLINIC | Age: 63
End: 2023-12-18

## 2023-12-18 ENCOUNTER — TELEPHONE (OUTPATIENT)
Dept: FAMILY MEDICINE CLINIC | Facility: CLINIC | Age: 63
End: 2023-12-18

## 2023-12-18 RX ORDER — SUCRALFATE 1 G/1
TABLET ORAL
Qty: 60 TABLET | Refills: 0 | Status: SHIPPED | OUTPATIENT
Start: 2023-12-18

## 2023-12-18 NOTE — TELEPHONE ENCOUNTER
Incoming call from patient requesting ct results, contacted [facility] radiology and advised them that the CT needed to be read so we can give patient results.

## 2023-12-18 NOTE — PROGRESS NOTES
"NELLY DEE returned missed call from the patient, Adina and discussed referral.     She indicated that she got a cat scan done on Thursday and got the results back. She stated she has more than one hernia. She is not \"understanding it at all\". NELLY DEE advised will have RAMIN Rivers contact her to further discuss medical questions. NELLY DEE advised also can contact office to ask follow up questions with clinical staff.     NELLY DEE and Adina discussed financial difficulties. She confirmed having trouble with rent. She went to [employer] and applied for rental assistance. She will call again in a couple days if does not hear from them. She was assigned a  but unsure of the name. She also contacted [employer] but they told her they were out of money. Adina explained she is behind this month's rent. She had an unexpected cr bill over $600 that she had to pay. She reported that her landlord is working with her. Her landlord is 92 years old and Adina feels bad. She does not think she will get evicted.     NELLY DEE and Adina discussed her anxiety. She has tried counseling in past. She not considering it at this time and she feels it is under control. She is nervous will be \"cut up\" again and spoke about her previous surgeries. NELYL DEE provided supportive counseling.   "

## 2023-12-21 ENCOUNTER — OFFICE VISIT (OUTPATIENT)
Dept: SURGERY | Facility: CLINIC | Age: 63
End: 2023-12-21
Payer: COMMERCIAL

## 2023-12-21 VITALS
SYSTOLIC BLOOD PRESSURE: 128 MMHG | OXYGEN SATURATION: 97 % | WEIGHT: 185 LBS | HEART RATE: 63 BPM | BODY MASS INDEX: 31.58 KG/M2 | HEIGHT: 64 IN | RESPIRATION RATE: 16 BRPM | TEMPERATURE: 96.9 F | DIASTOLIC BLOOD PRESSURE: 70 MMHG

## 2023-12-21 DIAGNOSIS — N18.30 STAGE 3 CHRONIC KIDNEY DISEASE, UNSPECIFIED WHETHER STAGE 3A OR 3B CKD (HCC): ICD-10-CM

## 2023-12-21 DIAGNOSIS — J43.8 OTHER EMPHYSEMA (HCC): ICD-10-CM

## 2023-12-21 DIAGNOSIS — G47.33 OSA (OBSTRUCTIVE SLEEP APNEA): ICD-10-CM

## 2023-12-21 DIAGNOSIS — Z01.818 PRE-OP EXAMINATION: ICD-10-CM

## 2023-12-21 DIAGNOSIS — I48.0 PAROXYSMAL ATRIAL FIBRILLATION (HCC): ICD-10-CM

## 2023-12-21 DIAGNOSIS — I11.0 HYPERTENSIVE HEART DISEASE WITH CONGESTIVE HEART FAILURE, UNSPECIFIED HEART FAILURE TYPE (HCC): ICD-10-CM

## 2023-12-21 DIAGNOSIS — K43.2 INCISIONAL HERNIA, WITHOUT OBSTRUCTION OR GANGRENE: Primary | ICD-10-CM

## 2023-12-21 PROCEDURE — 99213 OFFICE O/P EST LOW 20 MIN: CPT | Performed by: PHYSICIAN ASSISTANT

## 2023-12-22 ENCOUNTER — NURSE TRIAGE (OUTPATIENT)
Age: 63
End: 2023-12-22

## 2023-12-22 DIAGNOSIS — R39.9 UTI SYMPTOMS: Primary | ICD-10-CM

## 2023-12-22 NOTE — TELEPHONE ENCOUNTER
"Pt of Dr. Hobson in [place]. Last seen on 07/6/23 for atrophic vaginitis.     Pt reports treated by PCP for UTI with Cipro then Macrobid. Still having cloudy foul smelling urine. Does also have some nausea. Last dose was about a week ago. Feels like symptoms never really cleared up so unsure what to do at this point. Pt willing to go for urine testing but can't go until tomorrow. Placed orders for urine testing. Did review ER precautions with patient. Pt concerned about getting worse.   Reason for Disposition   Bad or foul-smelling urine    Answer Assessment - Initial Assessment Questions  1. SYMPTOM: \"What's the main symptom you're concerned about?\" (e.g., frequency, incontinence)      Cloudy urine  2. ONSET: \"When did the  symptoms  start?\"      About a week  3. PAIN: \"Is there any pain?\" If Yes, ask: \"How bad is it?\" (Scale: 1-10; mild, moderate, severe)      mild  4. CAUSE: \"What do you think is causing the symptoms?\"      UTI  5. OTHER SYMPTOMS: \"Do you have any other symptoms?\" (e.g., fever, flank pain, blood in urine, pain with urination)      Foul smelling urine    Protocols used: Urinary Symptoms-ADULT-[place]    "

## 2023-12-28 ENCOUNTER — PATIENT OUTREACH (OUTPATIENT)
Dept: FAMILY MEDICINE CLINIC | Facility: CLINIC | Age: 63
End: 2023-12-28

## 2023-12-29 ENCOUNTER — HOSPITAL ENCOUNTER (OUTPATIENT)
Dept: MAMMOGRAPHY | Facility: MEDICAL CENTER | Age: 63
Discharge: HOME | End: 2023-12-29
Payer: COMMERCIAL

## 2023-12-29 ENCOUNTER — TELEPHONE (OUTPATIENT)
Age: 63
End: 2023-12-29

## 2023-12-29 ENCOUNTER — HOSPITAL ENCOUNTER (OUTPATIENT)
Dept: BONE DENSITY | Facility: MEDICAL CENTER | Age: 63
Discharge: HOME | End: 2023-12-29
Payer: COMMERCIAL

## 2023-12-29 ENCOUNTER — TELEPHONE (OUTPATIENT)
Dept: CARDIOLOGY CLINIC | Facility: CLINIC | Age: 63
End: 2023-12-29

## 2023-12-29 VITALS — WEIGHT: 185 LBS | BODY MASS INDEX: 31.58 KG/M2 | HEIGHT: 64 IN

## 2023-12-29 DIAGNOSIS — Z13.820 SCREENING FOR OSTEOPOROSIS: ICD-10-CM

## 2023-12-29 DIAGNOSIS — Z12.31 BREAST CANCER SCREENING BY MAMMOGRAM: ICD-10-CM

## 2023-12-29 DIAGNOSIS — N30.10 INTERSTITIAL CYSTITIS: ICD-10-CM

## 2023-12-29 PROCEDURE — 77067 SCR MAMMO BI INCL CAD: CPT

## 2023-12-29 PROCEDURE — 77080 DXA BONE DENSITY AXIAL: CPT

## 2023-12-29 PROCEDURE — 77063 BREAST TOMOSYNTHESIS BI: CPT

## 2023-12-29 RX ORDER — PHENAZOPYRIDINE HYDROCHLORIDE 200 MG/1
200 TABLET, FILM COATED ORAL AS NEEDED
Qty: 30 TABLET | Refills: 0 | Status: SHIPPED | OUTPATIENT
Start: 2023-12-29

## 2023-12-29 NOTE — TELEPHONE ENCOUNTER
Pt called is planning surgery for hernia and a vascular procedure called in reference to Eliquis.  Explained surgeons of procedures need to reach out to office for doctor to doctor for instructions concerning Eliquis. She understood and will reach out to surgeons.

## 2024-01-03 DIAGNOSIS — Z90.49 S/P SMALL BOWEL RESECTION: ICD-10-CM

## 2024-01-03 DIAGNOSIS — F41.9 ANXIETY: ICD-10-CM

## 2024-01-03 DIAGNOSIS — I48.0 PAROXYSMAL ATRIAL FIBRILLATION (HCC): ICD-10-CM

## 2024-01-03 DIAGNOSIS — M54.16 LUMBAR RADICULOPATHY: ICD-10-CM

## 2024-01-03 RX ORDER — AMIODARONE HYDROCHLORIDE 200 MG/1
200 TABLET ORAL DAILY
Qty: 30 TABLET | Refills: 0 | Status: SHIPPED | OUTPATIENT
Start: 2024-01-03

## 2024-01-03 RX ORDER — LORAZEPAM 1 MG/1
1 TABLET ORAL 3 TIMES DAILY
Qty: 90 TABLET | Refills: 0 | Status: SHIPPED | OUTPATIENT
Start: 2024-01-03

## 2024-01-03 RX ORDER — TRAMADOL HYDROCHLORIDE 50 MG/1
100 TABLET ORAL EVERY 8 HOURS PRN
Qty: 180 TABLET | Refills: 0 | Status: SHIPPED | OUTPATIENT
Start: 2024-01-03

## 2024-01-04 ENCOUNTER — PATIENT OUTREACH (OUTPATIENT)
Dept: FAMILY MEDICINE CLINIC | Facility: CLINIC | Age: 64
End: 2024-01-04

## 2024-01-04 NOTE — PROGRESS NOTES
Contacted patient for f/u.  She continues to have abdominal discomfort due to hernia.  She is scheduled for hernia repair on 2/21.  She c/o nausea and has a diminished appetite.  She has been eating cereal and oatmeal over the last week.  She has issues with constipation and is taking miralax and colace.  She also notes issues with urinating due to hernia.  She experiences burning and is taking pyridium as needed.  No c/o chest pain, sob or LE edema.  She denies wheezing and cough.  She is taking all medications without issue.  Follow up appointments scheduled.  She has appt with vascular surgeon on 1/18  to discuss carotid surgery.  She denies transportation issues.  She is managing at home and does not need additional assistance.  She feels her anxiety is in good control.  Agreeable to continued outreach.

## 2024-01-06 DIAGNOSIS — I48.0 PAROXYSMAL ATRIAL FIBRILLATION (HCC): ICD-10-CM

## 2024-01-08 RX ORDER — DILTIAZEM HYDROCHLORIDE 120 MG/1
120 CAPSULE, COATED, EXTENDED RELEASE ORAL DAILY
Qty: 30 CAPSULE | Refills: 3 | Status: SHIPPED | OUTPATIENT
Start: 2024-01-08

## 2024-01-10 DIAGNOSIS — I10 BENIGN ESSENTIAL HYPERTENSION: ICD-10-CM

## 2024-01-10 DIAGNOSIS — R11.0 NAUSEA: ICD-10-CM

## 2024-01-10 DIAGNOSIS — I10 ESSENTIAL HYPERTENSION: ICD-10-CM

## 2024-01-10 RX ORDER — FUROSEMIDE 40 MG/1
40 TABLET ORAL DAILY
Qty: 90 TABLET | Refills: 0 | Status: SHIPPED | OUTPATIENT
Start: 2024-01-10

## 2024-01-10 RX ORDER — PROMETHAZINE HYDROCHLORIDE 25 MG/1
25 TABLET ORAL EVERY 6 HOURS PRN
Qty: 60 TABLET | Refills: 2 | Status: SHIPPED | OUTPATIENT
Start: 2024-01-10

## 2024-01-13 DIAGNOSIS — M54.16 LUMBAR RADICULOPATHY: ICD-10-CM

## 2024-01-15 DIAGNOSIS — J30.1 SEASONAL ALLERGIC RHINITIS DUE TO POLLEN: ICD-10-CM

## 2024-01-15 RX ORDER — METHOCARBAMOL 500 MG/1
500 TABLET, FILM COATED ORAL 3 TIMES DAILY
Qty: 90 TABLET | Refills: 0 | Status: SHIPPED | OUTPATIENT
Start: 2024-01-15

## 2024-01-15 RX ORDER — MONTELUKAST SODIUM 10 MG/1
10 TABLET ORAL
Qty: 90 TABLET | Refills: 1 | Status: SHIPPED | OUTPATIENT
Start: 2024-01-15 | End: 2024-07-13

## 2024-01-18 NOTE — PROGRESS NOTES
Assessment/Plan:   Nancy Jones is a 63 y.o.female who comes in today for incisional hernias.    PMH: CKD, a-fib with Eliquis and 81mg ASA, HTN, emphysema, Stage 3 CKD, mesenteric thrombosis, CAMILLE, congestive heart failure    Plan:  Has tight carotid stenosis on the left side and seems occasionally symptomatic.  She is to see vascular surgery for possible stent after aborted carotid surgery in the recent past.    This needs to be accomplished prior to considering any abdominal reconstruction.    2.  Weight management.  She would fare much better and all of her vascular issues as well as her hernia repair with a significant amount of weight loss to get her BMI closer to the 26 range.  She understands and is asked for weight management consult.    Then consider robotic repair of hernias.  Robotic multiple incisional epigastric and umbilical hernia repair with mesh possible open.     Cardiology  has cleared her for surgery on 12/27/2023 under the notation letters.  She is okay to hold Eliquis 2 days prior to surgery.  Also hold aspirin 1 week prior.        Vascular also cleared her for surgery.  Patient has many smaller epigastric hernias and a larger umbilical hernia with bowel involvement. These area all incisional hernias from her laparotomy.  We discussed she could have a lot of adhesions from prior surgery that could complicate this surgery and we may need to open. She understands. We did discuss symptoms of strangulation/incarceration of hernia and when to go to ER.    Patient needs medical and cardiac clearance with anticoagulation recommendations.   Onikul clearance pending  Cardiac and vascular clearance achieved.      CT 12/14/23:  New left periumbilical widemouthed ventral abdominal wall hernia containing several loops of nonobstructed small bowel with opening of 7 cm.  Several smaller fat-containing midline ventral abdominal wall hernias.  Improved findings of omental infarct in the right upper quadrant  with minimal residual stranding identified.    Personally reviewed CT at length with patient in office. I agree with radiology reading.   ______________________________________________________  HPI:  Nancy Jones is a 63 y.o.female who comes in today for postoperative check after recent recent laparotomy.         Patient today states she is having pain left of her umbilicus and a hard lump. She states she noticed this about 3-4 weeks ago. The lump is hard. She states she takes mirilax normally for constipation which helps keeps her bowel regulated. Denies nausea, vomiting, fevers, and blood in stool. Patient states she Has been doing well other then the hernias and the associated abdominal pain. She did have CT 12/14/23.    History of LAPAROTOMY EXPLORATORY W/ BOWEL RESECTION. LAPAROSCOPY DIAGNOSTIC. LYSIS OF ADHESIONS. LAPARASCOPY TAKE TAKEDOWN OF LEFT COLON.  EXPLORATORY LAPAROSCOPY LYSIS OF ADHESIONS. RESECTION TRANSVERSE COLON , SPLENIC FLEXURE, . AND DESCENDING COLON . TAKE DOWN OF SPLENIC FLEXURE. SIGMOIDOSCOPY.MOBILIZATION OF RIGHT COLON. PRIMARY ANASTOMOSIS EEA 29. TAP BLOCK on 7/19/23 with Dr. Recinos.     ROS:  General ROS: negative for - chills, fatigue, fever or night sweats, weight loss  Respiratory ROS: no cough, shortness of breath, or wheezing  Cardiovascular ROS: no chest pain or dyspnea on exertion  Genito-Urinary ROS: no dysuria, trouble voiding, or hematuria  Musculoskeletal ROS: negative for - gait disturbance, joint pain or muscle pain  Neurological ROS: no TIA or stroke symptoms  GI ROS: see HPI  Skin ROS: no new rashes or lesions   Lymphatic ROS: no new adenopathy noted by pt.   GYN ROS: see HPI, no new GYN history or bleeding noted  Psy ROS: no new mental or behavioral disturbances         Patient Active Problem List   Diagnosis   • Angina pectoris (HCC)   • Essential hypertension   • Migraines   • AMD (age-related macular degeneration), bilateral   • Allergic reaction   • Gastroesophageal  reflux disease without esophagitis   • Allergic conjunctivitis of both eyes   • Angio-edema   • Paroxysmal atrial fibrillation (HCC)   • Lumbar radiculopathy   • CAMILLE (obstructive sleep apnea)   • Hypertensive heart disease with congestive heart failure (HCC)   • Unspecified diastolic (congestive) heart failure (HCC)   • 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HCC)   • COPD (chronic obstructive pulmonary disease) (HCC)   • Urinary retention   • Peripheral vascular disease (HCC)   • Appendix disease   • Carotid artery stenosis   • Stenosis of left carotid artery   • Asymptomatic stenosis of left carotid artery   • Hepatic steatosis   • Partial facial nerve palsy   • Injury of left facial nerve   • Paresthesia   • Colitis   • Chronic migraine w/o aura w/o status migrainosus, not intractable   • IIH (idiopathic intracranial hypertension)   • Hematochezia   • Left carotid stenosis   • Colonic ischemia (HCC)   • Acute postoperative pain   • S/P small bowel resection   • Anxiety   • Omental infarction (HCC)   • Hypokalemia   • Night sweats   • Dysuria   • Incisional hernia, without obstruction or gangrene   • Stage 3 chronic kidney disease, unspecified whether stage 3a or 3b CKD ([facility])   • Ventral hernia without obstruction or gangrene   • Atherosclerosis of native arteries of extremities with rest pain, bilateral legs (HCC)   • Acute on chronic diastolic (congestive) heart failure (HCC)       Allergies:  Ace inhibitors, Benicar [olmesartan], Hydralazine, Other, Valsartan, Sulfa antibiotics, Ampicillin, Motrin [ibuprofen], and Wound dressing adhesive      Current Outpatient Medications:   •  acetaminophen (TYLENOL) 325 mg tablet, Take 2 tablets (650 mg total) by mouth every 6 (six) hours, Disp: , Rfl: 0  •  amiodarone 200 mg tablet, Take 1 tablet (200 mg total) by mouth daily, Disp: 30 tablet, Rfl: 0  •  Ascorbic Acid (vitamin C) 1000 MG tablet, Take 1 tablet (1,000 mg total) by mouth daily, Disp: 30 tablet, Rfl: 0  •  aspirin 81 mg  chewable tablet, Chew 1 tablet (81 mg total) daily, Disp: , Rfl: 0  •  Atrovent HFA 17 MCG/ACT inhaler, Inhale 2 puffs 4 (four) times a day as needed, Disp: , Rfl:   •  cetirizine (ZyrTEC) 10 mg tablet, Take 10 mg by mouth daily, Disp: , Rfl:   •  Chelated Zinc 50 MG TABS, Take 50 mg by mouth daily, Disp: , Rfl:   •  chlordiazepoxide-clidinium (LIBRAX) 5-2.5 mg per capsule, TAKE ONE CAPSULE BY MOUTH FOUR TIMES DAILY AND ONE CAPSULE BEFORE BEDTIME. (Patient taking differently: 1 capsule daily as needed for cramping), Disp: 120 capsule, Rfl: 0  •  Cholecalciferol 25 MCG (1000 UT) tablet, Take 1,000 Units by mouth daily, Disp: , Rfl:   •  dexamethasone (DECADRON) 4 mg tablet, 1 tab PO with breakfast for 1 to 5 days for unrelenting migraine, Disp: 5 tablet, Rfl: 0  •  diltiazem (CARDIZEM CD) 120 mg 24 hr capsule, TAKE ONE CAPSULE BY MOUTH ONCE DAILY, Disp: 30 capsule, Rfl: 3  •  Docusate Sodium (COLACE PO), Take by mouth daily at bedtime, Disp: , Rfl:   •  Eliquis 5 MG, TAKE ONE TABLET BY MOUTH TWICE DAILY, Disp: 180 tablet, Rfl: 3  •  famotidine (PEPCID) 20 mg tablet, Take 20 mg by mouth daily at bedtime, Disp: , Rfl:   •  fexofenadine (ALLEGRA) 180 MG tablet, Take 180 mg by mouth daily, Disp: , Rfl:   •  fluticasone-vilanterol (BREO ELLIPTA) 200-25 MCG/INH inhaler, Inhale 1 puff daily Rinse mouth after use., Disp: 3 Inhaler, Rfl: 3  •  furosemide (LASIX) 40 mg tablet, Take 1 tablet (40 mg total) by mouth daily, Disp: 90 tablet, Rfl: 0  •  hydrOXYzine HCL (ATARAX) 25 mg tablet, TAKE TWO TABLETS BY MOUTH DAILY AT BEDTIME (Patient taking differently: daily as needed), Disp: 90 tablet, Rfl: 0  •  LORazepam (ATIVAN) 1 mg tablet, Take 1 tablet (1 mg total) by mouth 3 (three) times a day, Disp: 90 tablet, Rfl: 0  •  magnesium Oxide (MAG-OX) 400 mg TABS, Take 1 tablet (400 mg total) by mouth daily at bedtime, Disp: 90 tablet, Rfl: 3  •  methocarbamol (ROBAXIN) 500 mg tablet, TAKE ONE TABLET BY MOUTH THREE TIMES DAILY, Disp:  90 tablet, Rfl: 0  •  metoprolol succinate (TOPROL-XL) 100 mg 24 hr tablet, TAKE ONE TABLET BY MOUTH TWICE DAILY, Disp: 180 tablet, Rfl: 1  •  montelukast (SINGULAIR) 10 mg tablet, Take 1 tablet (10 mg total) by mouth daily at bedtime, Disp: 90 tablet, Rfl: 1  •  MULTIPLE VITAMIN PO, Take by mouth, Disp: , Rfl:   •  naloxone (NARCAN) 4 mg/0.1 mL nasal spray, Administer 1 spray into a nostril. If no response after 2-3 minutes, give another dose in the other nostril using a new spray., Disp: 1 each, Rfl: 1  •  omeprazole (PriLOSEC) 40 MG capsule, Take 1 capsule (40 mg total) by mouth daily, Disp: 3 capsule, Rfl: 3  •  phenazopyridine (PYRIDIUM) 200 mg tablet, Take 1 tablet (200 mg total) by mouth if needed for bladder spasms, Disp: 30 tablet, Rfl: 0  •  potassium chloride (Klor-Con) 10 mEq tablet, Take 1 tablet (10 mEq total) by mouth 2 (two) times a day, Disp: 180 tablet, Rfl: 0  •  promethazine (PHENERGAN) 25 mg tablet, Take 1 tablet (25 mg total) by mouth every 6 (six) hours as needed for nausea or vomiting, Disp: 60 tablet, Rfl: 2  •  Repatha SureClick 140 MG/ML SOAJ, Inject 140mg under the skin every 2 weeks., Disp: 2 mL, Rfl: 11  •  spironolactone (ALDACTONE) 25 mg tablet, TAKE ONE TABLET BY MOUTH TWICE DAILY, Disp: 180 tablet, Rfl: 1  •  sucralfate (CARAFATE) 1 g tablet, TAKE ONE TABLET BY MOUTH TWICE DAILY BEFORE MEALS, Disp: 60 tablet, Rfl: 0  •  traMADol (ULTRAM) 50 mg tablet, Take 2 tablets (100 mg total) by mouth every 8 (eight) hours as needed for moderate pain, Disp: 180 tablet, Rfl: 0    Current Facility-Administered Medications:   •  cyanocobalamin injection 1,000 mcg, 1,000 mcg, Intramuscular, Q30 Days, Coy Ayoub DO, 1,000 mcg at 11/16/23 1151    Past Medical History:   Diagnosis Date   • A-fib (formerly [facility]) 03/2020   • Allergic    • Anxiety    • Arthritis    • Asthma    • Back pain     and muscle pain   • Bruises easily    • Chronic pain disorder     Interstitial pain   • Colon polyp    • Dental bridge  "present    • Depression    • Eczema    • GERD (gastroesophageal reflux disease)    • Heart murmur    • History of blood clots     per pt \"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\"   • History of pneumonia    • Hives    • Hyperlipidemia    • Hypertension    • Infertility, female    • Interstitial cystitis    • Migraine     sees neurologist   • Motion sickness    • Obesity    • Palpitations    • PONV (postoperative nausea and vomiting)    • Premature atrial contractions 11/09/2022   • Psychiatric disorder    • TIA (transient ischemic attack) 09/2022    per pt --\"had MRI and 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\"   • Urinary incontinence     wears Pads   • Venous insufficiency 08/01/2017   • Wears glasses        Past Surgical History:   Procedure Laterality Date   • COLONOSCOPY     • DILATION AND CURETTAGE OF UTERUS     • EGD     • EXPLORATORY LAPAROTOMY      for infertility   • EXPLORATORY LAPAROTOMY W/ BOWEL RESECTION N/A 7/19/2023    Procedure: LAPAROTOMY EXPLORATORY W/ BOWEL RESECTION;  Surgeon: Crista Recinos MD;  Location: [facility];  Service: General   • HAND SURGERY      Hand excision of tendon cyst   • MD LAPAROSCOPIC APPENDECTOMY N/A 05/03/2022    Procedure: APPENDECTOMY LAPAROSCOPIC;  Surgeon: Vin Fields MD;  Location: [facility];  Service: General   • MD LAPS ABD PRTM&OMENTUM DX W/WO SPEC BR/WA SPX N/A 7/19/2023    Procedure: LAPAROSCOPY DIAGNOSTIC, LYSIS OF ADHESIONS, LAPAROSCOPY TAKE TAKEDOWN OF LEFT COLON, EXPLORATORY LAPAROSCOPY, LYSIS OF ADHESIONS, RESECTION OF TRANSVERSE COLON SPLENIC FLEXURE AND DESCENDING COLON , TAKE DOWN OF SPLENIC FLEXURE, SIGMOIDOSCOPY, MOBILIZATION OF RIGHT COLON, PRIMARY ANASTOMOSIS EEA 29, TAP BLOCK;  Surgeon: Crista Recinos MD;  Location: [facility];  Service: General   • MD TEAEC W/PATCH GRF CAROTID VERTB SUBCLAV NECK INC Left 1/31/2023    Procedure: EXPLORATION OF LEFT CAROTID ARTERY; ABORTED ENDARTERECTOMY ARTERY CAROTID;  Surgeon: Roberto" DO Ricky;  Location: [facility] OR;  Service: Vascular   • SUPERIOR MESTENTERIC ARTERY STENT     • WISDOM TOOTH EXTRACTION         Family History   Problem Relation Age of Onset   • Lung cancer Mother 60   • Brain cancer Mother 60   • Diabetes Father    • Depression Father    • Other Father         septic   • Allergies Sister    • Hashimoto's thyroiditis Sister    • Abdominal aortic aneurysm Sister    • Diabetes Sister    • No Known Problems Sister    • No Known Problems Maternal Grandmother    • No Known Problems Maternal Grandfather    • No Known Problems Paternal Grandmother    • No Known Problems Paternal Grandfather    • Hodgkin's lymphoma Brother    • No Known Problems Brother    • No Known Problems Maternal Aunt    • Diabetes Other    • Breast cancer Neg Hx         reports that she quit smoking about 5 years ago. Her smoking use included cigarettes. She started smoking about 15 years ago. She has a 5.0 pack-year smoking history. She has never been exposed to tobacco smoke. She has never used smokeless tobacco. She reports that she does not currently use alcohol. She reports that she does not use drugs.    Vitals:    01/25/24 1437   BP: 132/78   Pulse: 90   Temp: (!) 97.4 °F (36.3 °C)   SpO2: 95%           PHYSICAL EXAM  General: normal, cooperative, no distress  Lungs clear to auscultation  Left carotid I cannot appreciate a bruit  Heart normal sinus rhythm  Extremities without edema  Abdominal: soft, nondistended or non-tender There are very small epigastric hernias easily reduced and a umbilical /periumbilical to the left hernia that is not reducible at today's visit.   Ambulatory without obvious musculoskeletal defect  Neurological exam grossly intact  Skin without jaundice.        Crista Badillo MD    Date: 1/25/2024 Time: 3:14 PM

## 2024-01-19 ENCOUNTER — TELEPHONE (OUTPATIENT)
Dept: SURGERY | Facility: CLINIC | Age: 64
End: 2024-01-19

## 2024-01-19 ENCOUNTER — OFFICE VISIT (OUTPATIENT)
Dept: FAMILY MEDICINE CLINIC | Facility: CLINIC | Age: 64
End: 2024-01-19
Payer: COMMERCIAL

## 2024-01-19 VITALS
OXYGEN SATURATION: 97 % | HEART RATE: 58 BPM | HEIGHT: 64 IN | TEMPERATURE: 98 F | WEIGHT: 186 LBS | BODY MASS INDEX: 31.76 KG/M2 | DIASTOLIC BLOOD PRESSURE: 80 MMHG | SYSTOLIC BLOOD PRESSURE: 118 MMHG

## 2024-01-19 DIAGNOSIS — I48.0 PAROXYSMAL ATRIAL FIBRILLATION (HCC): ICD-10-CM

## 2024-01-19 DIAGNOSIS — I20.9 ANGINA PECTORIS (HCC): ICD-10-CM

## 2024-01-19 DIAGNOSIS — N18.30 STAGE 3 CHRONIC KIDNEY DISEASE, UNSPECIFIED WHETHER STAGE 3A OR 3B CKD (HCC): ICD-10-CM

## 2024-01-19 DIAGNOSIS — K43.2 INCISIONAL HERNIA, WITHOUT OBSTRUCTION OR GANGRENE: ICD-10-CM

## 2024-01-19 DIAGNOSIS — K55.069 MESENTERIC ARTERY THROMBOSIS (HCC): ICD-10-CM

## 2024-01-19 DIAGNOSIS — I50.33 ACUTE ON CHRONIC DIASTOLIC (CONGESTIVE) HEART FAILURE (HCC): ICD-10-CM

## 2024-01-19 DIAGNOSIS — I70.223 ATHEROSCLEROSIS OF NATIVE ARTERIES OF EXTREMITIES WITH REST PAIN, BILATERAL LEGS (HCC): ICD-10-CM

## 2024-01-19 DIAGNOSIS — I65.22 STENOSIS OF LEFT CAROTID ARTERY: Primary | ICD-10-CM

## 2024-01-19 DIAGNOSIS — I11.0 HYPERTENSIVE HEART DISEASE WITH CONGESTIVE HEART FAILURE, UNSPECIFIED HEART FAILURE TYPE (HCC): ICD-10-CM

## 2024-01-19 DIAGNOSIS — J43.8 OTHER EMPHYSEMA (HCC): ICD-10-CM

## 2024-01-19 PROBLEM — K43.9 VENTRAL HERNIA WITHOUT OBSTRUCTION OR GANGRENE: Status: ACTIVE | Noted: 2024-01-19

## 2024-01-19 PROCEDURE — 99214 OFFICE O/P EST MOD 30 MIN: CPT | Performed by: FAMILY MEDICINE

## 2024-01-19 NOTE — PROGRESS NOTES
Assessment/Plan: Patient will continue with amiodarone for A-fib which is stable at this time.  Patient in normal sinus rhythm.  Patient will follow-up with cardiology appropriately.  Patient will be referred back to cardiology for further evaluation given palpitations tachycardia etc. patient will follow-up with vascular specialist regarding left carotid artery stenosis and will follow-up with general surgeon regarding ventral/abdominal hernias.  Guidance given overall.       Diagnoses and all orders for this visit:    Stenosis of left carotid artery    Paroxysmal atrial fibrillation (HCC)  -     Ambulatory Referral to Cardiology; Future    Incisional hernia, without obstruction or gangrene    Atherosclerosis of native arteries of extremities with rest pain, bilateral legs (HCC)    Acute on chronic diastolic (congestive) heart failure (HCC)    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HCC)    Other emphysema (HCC)    Hypertensive heart disease with congestive heart failure, unspecified heart failure type (HCC)    Angina pectoris (HCC)    Stage 3 chronic kidney disease, unspecified whether stage 3a or 3b CKD (HCC)            Subjective:        Patient ID: Nancy Jones is a 63 y.o. female.      Patient is here to follow-up on multiple issues including carotid stenosis on the left as well as multiple abdominal hernia noted.  Patient also with A-fib.  Patient is on amiodarone.  Recently amiodarone did help with palpitations etc. but now patient is having recurrent palpitations.  Patient notices some tachycardia.  Patient does have vascular specialist but no date for surgery yet.  Patient is going to have hernia surgery in February 21.  Patient was feeling better but now feeling lately worse.  Patient more tired.          The following portions of the patient's history were reviewed and updated as appropriate: allergies, current medications, past family history, past medical history, past social history, past surgical history and  "problem list.      Review of Systems   Constitutional:  Positive for fatigue.   HENT: Negative.     Eyes: Negative.    Respiratory: Negative.     Cardiovascular:  Positive for palpitations.   Gastrointestinal:  Positive for abdominal pain.   Endocrine: Negative.    Genitourinary: Negative.    Musculoskeletal: Negative.    Skin: Negative.    Allergic/Immunologic: Negative.    Neurological: Negative.    Hematological: Negative.    Psychiatric/Behavioral: Negative.             Objective:               /80 (BP Location: Right arm, Patient Position: Sitting, Cuff Size: Large)   Pulse 58   Temp 98 °F (36.7 °C) (Temporal)   Ht 5' 4\" (1.626 m)   Wt 84.4 kg (186 lb)   LMP  (LMP Unknown)   SpO2 97%   BMI 31.93 kg/m²          Physical Exam  Vitals and nursing note reviewed.   Constitutional:       General: She is not in acute distress.     Appearance: Normal appearance. She is not ill-appearing, toxic-appearing or diaphoretic.   HENT:      Head: Normocephalic and atraumatic.      Right Ear: Tympanic membrane, ear canal and external ear normal. There is no impacted cerumen.      Left Ear: Tympanic membrane, ear canal and external ear normal. There is no impacted cerumen.      Nose: Nose normal. No congestion or rhinorrhea.      Mouth/Throat:      Mouth: Mucous membranes are moist.      Pharynx: No oropharyngeal exudate or posterior oropharyngeal erythema.   Eyes:      General: No scleral icterus.        Right eye: No discharge.         Left eye: No discharge.   Neck:      Vascular: No carotid bruit.   Cardiovascular:      Rate and Rhythm: Normal rate and regular rhythm.      Pulses: Normal pulses.      Heart sounds: Normal heart sounds. No murmur heard.     No friction rub. No gallop.   Pulmonary:      Effort: Pulmonary effort is normal. No respiratory distress.      Breath sounds: Normal breath sounds. No stridor. No wheezing, rhonchi or rales.   Chest:      Chest wall: No tenderness.   Abdominal:      General: " Abdomen is flat. There is no distension.      Palpations: Abdomen is soft.      Tenderness: There is no abdominal tenderness. There is no guarding or rebound.      Hernia: A hernia is present.   Musculoskeletal:         General: No swelling, tenderness, deformity or signs of injury.      Cervical back: Normal range of motion and neck supple. No rigidity. No muscular tenderness.      Right lower leg: No edema.      Left lower leg: No edema.   Lymphadenopathy:      Cervical: No cervical adenopathy.   Skin:     General: Skin is warm and dry.      Capillary Refill: Capillary refill takes less than 2 seconds.      Coloration: Skin is not jaundiced.      Findings: No bruising, erythema, lesion or rash.   Neurological:      Mental Status: She is alert and oriented to person, place, and time. Mental status is at baseline.      Cranial Nerves: No cranial nerve deficit.      Sensory: No sensory deficit.      Motor: No weakness.      Coordination: Coordination normal.      Gait: Gait normal.   Psychiatric:         Mood and Affect: Mood normal.         Behavior: Behavior normal.         Thought Content: Thought content normal.         Judgment: Judgment normal.

## 2024-01-21 DIAGNOSIS — K21.9 GASTROESOPHAGEAL REFLUX DISEASE WITHOUT ESOPHAGITIS: ICD-10-CM

## 2024-01-22 ENCOUNTER — OFFICE VISIT (OUTPATIENT)
Dept: CARDIOLOGY CLINIC | Facility: CLINIC | Age: 64
End: 2024-01-22
Payer: COMMERCIAL

## 2024-01-22 ENCOUNTER — APPOINTMENT (OUTPATIENT)
Dept: LAB | Facility: HOSPITAL | Age: 64
End: 2024-01-22
Payer: COMMERCIAL

## 2024-01-22 VITALS
BODY MASS INDEX: 32.61 KG/M2 | WEIGHT: 190 LBS | HEART RATE: 57 BPM | SYSTOLIC BLOOD PRESSURE: 120 MMHG | DIASTOLIC BLOOD PRESSURE: 70 MMHG

## 2024-01-22 DIAGNOSIS — Z01.818 PRE-OP EXAMINATION: ICD-10-CM

## 2024-01-22 DIAGNOSIS — R00.2 PALPITATIONS: ICD-10-CM

## 2024-01-22 DIAGNOSIS — R07.9 CHEST PAIN, UNSPECIFIED TYPE: ICD-10-CM

## 2024-01-22 DIAGNOSIS — K43.2 INCISIONAL HERNIA, WITHOUT OBSTRUCTION OR GANGRENE: ICD-10-CM

## 2024-01-22 DIAGNOSIS — Z01.810 PREOP CARDIOVASCULAR EXAM: Primary | ICD-10-CM

## 2024-01-22 LAB
ALBUMIN SERPL BCP-MCNC: 4.7 G/DL (ref 3.5–5)
ALP SERPL-CCNC: 79 U/L (ref 34–104)
ALT SERPL W P-5'-P-CCNC: 15 U/L (ref 7–52)
ANION GAP SERPL CALCULATED.3IONS-SCNC: 6 MMOL/L
AST SERPL W P-5'-P-CCNC: 17 U/L (ref 13–39)
BASOPHILS # BLD AUTO: 0.06 THOUSANDS/ÂΜL (ref 0–0.1)
BASOPHILS NFR BLD AUTO: 1 % (ref 0–1)
BILIRUB SERPL-MCNC: 0.41 MG/DL (ref 0.2–1)
BUN SERPL-MCNC: 24 MG/DL (ref 5–25)
CALCIUM SERPL-MCNC: 9.4 MG/DL (ref 8.4–10.2)
CHLORIDE SERPL-SCNC: 98 MMOL/L (ref 96–108)
CO2 SERPL-SCNC: 32 MMOL/L (ref 21–32)
CREAT SERPL-MCNC: 1.11 MG/DL (ref 0.6–1.3)
EOSINOPHIL # BLD AUTO: 0.23 THOUSAND/ÂΜL (ref 0–0.61)
EOSINOPHIL NFR BLD AUTO: 3 % (ref 0–6)
ERYTHROCYTE [DISTWIDTH] IN BLOOD BY AUTOMATED COUNT: 13.2 % (ref 11.6–15.1)
GFR SERPL CREATININE-BSD FRML MDRD: 52 ML/MIN/1.73SQ M
GLUCOSE P FAST SERPL-MCNC: 91 MG/DL (ref 65–99)
HCT VFR BLD AUTO: 40 % (ref 34.8–46.1)
HGB BLD-MCNC: 12.7 G/DL (ref 11.5–15.4)
IMM GRANULOCYTES # BLD AUTO: 0.03 THOUSAND/UL (ref 0–0.2)
IMM GRANULOCYTES NFR BLD AUTO: 0 % (ref 0–2)
LYMPHOCYTES # BLD AUTO: 2.13 THOUSANDS/ÂΜL (ref 0.6–4.47)
LYMPHOCYTES NFR BLD AUTO: 28 % (ref 14–44)
MCH RBC QN AUTO: 28.7 PG (ref 26.8–34.3)
MCHC RBC AUTO-ENTMCNC: 31.8 G/DL (ref 31.4–37.4)
MCV RBC AUTO: 91 FL (ref 82–98)
MONOCYTES # BLD AUTO: 0.61 THOUSAND/ÂΜL (ref 0.17–1.22)
MONOCYTES NFR BLD AUTO: 8 % (ref 4–12)
NEUTROPHILS # BLD AUTO: 4.65 THOUSANDS/ÂΜL (ref 1.85–7.62)
NEUTS SEG NFR BLD AUTO: 60 % (ref 43–75)
NRBC BLD AUTO-RTO: 0 /100 WBCS
PLATELET # BLD AUTO: 270 THOUSANDS/UL (ref 149–390)
PMV BLD AUTO: 9.1 FL (ref 8.9–12.7)
POTASSIUM SERPL-SCNC: 4.1 MMOL/L (ref 3.5–5.3)
PROT SERPL-MCNC: 7.2 G/DL (ref 6.4–8.4)
RBC # BLD AUTO: 4.42 MILLION/UL (ref 3.81–5.12)
SODIUM SERPL-SCNC: 136 MMOL/L (ref 135–147)
WBC # BLD AUTO: 7.71 THOUSAND/UL (ref 4.31–10.16)

## 2024-01-22 PROCEDURE — 93000 ELECTROCARDIOGRAM COMPLETE: CPT | Performed by: INTERNAL MEDICINE

## 2024-01-22 PROCEDURE — 80053 COMPREHEN METABOLIC PANEL: CPT

## 2024-01-22 PROCEDURE — 99215 OFFICE O/P EST HI 40 MIN: CPT | Performed by: INTERNAL MEDICINE

## 2024-01-22 PROCEDURE — 85025 COMPLETE CBC W/AUTO DIFF WBC: CPT

## 2024-01-22 PROCEDURE — 36415 COLL VENOUS BLD VENIPUNCTURE: CPT

## 2024-01-22 RX ORDER — SUCRALFATE 1 G/1
TABLET ORAL
Qty: 60 TABLET | Refills: 0 | Status: SHIPPED | OUTPATIENT
Start: 2024-01-22

## 2024-01-24 ENCOUNTER — HOSPITAL ENCOUNTER (EMERGENCY)
Facility: HOSPITAL | Age: 64
Discharge: HOME | End: 2024-01-24
Attending: EMERGENCY MEDICINE | Admitting: EMERGENCY MEDICINE
Payer: COMMERCIAL

## 2024-01-24 ENCOUNTER — APPOINTMENT (EMERGENCY)
Dept: CT IMAGING | Facility: HOSPITAL | Age: 64
End: 2024-01-24
Payer: COMMERCIAL

## 2024-01-24 ENCOUNTER — TELEPHONE (OUTPATIENT)
Dept: CARDIOLOGY CLINIC | Facility: CLINIC | Age: 64
End: 2024-01-24

## 2024-01-24 ENCOUNTER — TELEPHONE (OUTPATIENT)
Age: 64
End: 2024-01-24

## 2024-01-24 ENCOUNTER — APPOINTMENT (EMERGENCY)
Dept: RADIOLOGY | Facility: HOSPITAL | Age: 64
End: 2024-01-24
Payer: COMMERCIAL

## 2024-01-24 VITALS
TEMPERATURE: 97.4 F | OXYGEN SATURATION: 94 % | BODY MASS INDEX: 32.63 KG/M2 | HEIGHT: 64 IN | RESPIRATION RATE: 22 BRPM | SYSTOLIC BLOOD PRESSURE: 126 MMHG | DIASTOLIC BLOOD PRESSURE: 60 MMHG | WEIGHT: 191.14 LBS | HEART RATE: 54 BPM

## 2024-01-24 DIAGNOSIS — G43.909 MIGRAINE HEADACHE: Primary | ICD-10-CM

## 2024-01-24 DIAGNOSIS — K21.9 GASTROESOPHAGEAL REFLUX DISEASE WITHOUT ESOPHAGITIS: Primary | ICD-10-CM

## 2024-01-24 DIAGNOSIS — R00.2 PALPITATIONS: ICD-10-CM

## 2024-01-24 LAB
2HR DELTA HS TROPONIN: 0 NG/L
ALBUMIN SERPL BCP-MCNC: 3.3 G/DL (ref 3.5–5)
ALP SERPL-CCNC: 54 U/L (ref 34–104)
ALT SERPL W P-5'-P-CCNC: 10 U/L (ref 7–52)
ANION GAP SERPL CALCULATED.3IONS-SCNC: 7 MMOL/L
AST SERPL W P-5'-P-CCNC: 11 U/L (ref 13–39)
BASOPHILS # BLD AUTO: 0.04 THOUSANDS/ÂΜL (ref 0–0.1)
BASOPHILS NFR BLD AUTO: 1 % (ref 0–1)
BILIRUB SERPL-MCNC: 0.27 MG/DL (ref 0.2–1)
BUN SERPL-MCNC: 17 MG/DL (ref 5–25)
CALCIUM ALBUM COR SERPL-MCNC: 7.5 MG/DL (ref 8.3–10.1)
CALCIUM SERPL-MCNC: 6.9 MG/DL (ref 8.4–10.2)
CARDIAC TROPONIN I PNL SERPL HS: 5 NG/L
CARDIAC TROPONIN I PNL SERPL HS: 5 NG/L
CHLORIDE SERPL-SCNC: 111 MMOL/L (ref 96–108)
CO2 SERPL-SCNC: 23 MMOL/L (ref 21–32)
CREAT SERPL-MCNC: 0.68 MG/DL (ref 0.6–1.3)
EOSINOPHIL # BLD AUTO: 0.17 THOUSAND/ÂΜL (ref 0–0.61)
EOSINOPHIL NFR BLD AUTO: 3 % (ref 0–6)
ERYTHROCYTE [DISTWIDTH] IN BLOOD BY AUTOMATED COUNT: 13.1 % (ref 11.6–15.1)
GFR SERPL CREATININE-BSD FRML MDRD: 92 ML/MIN/1.73SQ M
GLUCOSE SERPL-MCNC: 80 MG/DL (ref 65–140)
HCT VFR BLD AUTO: 38.6 % (ref 34.8–46.1)
HGB BLD-MCNC: 12.4 G/DL (ref 11.5–15.4)
IMM GRANULOCYTES # BLD AUTO: 0.03 THOUSAND/UL (ref 0–0.2)
IMM GRANULOCYTES NFR BLD AUTO: 1 % (ref 0–2)
LYMPHOCYTES # BLD AUTO: 1.71 THOUSANDS/ÂΜL (ref 0.6–4.47)
LYMPHOCYTES NFR BLD AUTO: 33 % (ref 14–44)
MAGNESIUM SERPL-MCNC: 1.8 MG/DL (ref 1.9–2.7)
MCH RBC QN AUTO: 29.1 PG (ref 26.8–34.3)
MCHC RBC AUTO-ENTMCNC: 32.1 G/DL (ref 31.4–37.4)
MCV RBC AUTO: 91 FL (ref 82–98)
MONOCYTES # BLD AUTO: 0.31 THOUSAND/ÂΜL (ref 0.17–1.22)
MONOCYTES NFR BLD AUTO: 6 % (ref 4–12)
NEUTROPHILS # BLD AUTO: 2.89 THOUSANDS/ÂΜL (ref 1.85–7.62)
NEUTS SEG NFR BLD AUTO: 56 % (ref 43–75)
NRBC BLD AUTO-RTO: 0 /100 WBCS
PLATELET # BLD AUTO: 260 THOUSANDS/UL (ref 149–390)
PMV BLD AUTO: 8.9 FL (ref 8.9–12.7)
POTASSIUM SERPL-SCNC: 3.6 MMOL/L (ref 3.5–5.3)
PROT SERPL-MCNC: 5 G/DL (ref 6.4–8.4)
RBC # BLD AUTO: 4.26 MILLION/UL (ref 3.81–5.12)
SODIUM SERPL-SCNC: 141 MMOL/L (ref 135–147)
T4 FREE SERPL-MCNC: 0.83 NG/DL (ref 0.61–1.12)
TSH SERPL DL<=0.05 MIU/L-ACNC: 4.65 UIU/ML (ref 0.45–4.5)
WBC # BLD AUTO: 5.15 THOUSAND/UL (ref 4.31–10.16)

## 2024-01-24 PROCEDURE — 70450 CT HEAD/BRAIN W/O DYE: CPT

## 2024-01-24 PROCEDURE — 71045 X-RAY EXAM CHEST 1 VIEW: CPT

## 2024-01-24 PROCEDURE — 96365 THER/PROPH/DIAG IV INF INIT: CPT

## 2024-01-24 PROCEDURE — 84439 ASSAY OF FREE THYROXINE: CPT | Performed by: EMERGENCY MEDICINE

## 2024-01-24 PROCEDURE — 36415 COLL VENOUS BLD VENIPUNCTURE: CPT | Performed by: EMERGENCY MEDICINE

## 2024-01-24 PROCEDURE — 99285 EMERGENCY DEPT VISIT HI MDM: CPT | Performed by: EMERGENCY MEDICINE

## 2024-01-24 PROCEDURE — 84484 ASSAY OF TROPONIN QUANT: CPT | Performed by: EMERGENCY MEDICINE

## 2024-01-24 PROCEDURE — 83735 ASSAY OF MAGNESIUM: CPT | Performed by: EMERGENCY MEDICINE

## 2024-01-24 PROCEDURE — 80053 COMPREHEN METABOLIC PANEL: CPT | Performed by: EMERGENCY MEDICINE

## 2024-01-24 PROCEDURE — 99285 EMERGENCY DEPT VISIT HI MDM: CPT

## 2024-01-24 PROCEDURE — 85025 COMPLETE CBC W/AUTO DIFF WBC: CPT | Performed by: EMERGENCY MEDICINE

## 2024-01-24 PROCEDURE — 84443 ASSAY THYROID STIM HORMONE: CPT | Performed by: EMERGENCY MEDICINE

## 2024-01-24 PROCEDURE — 96375 TX/PRO/DX INJ NEW DRUG ADDON: CPT

## 2024-01-24 PROCEDURE — 93005 ELECTROCARDIOGRAM TRACING: CPT

## 2024-01-24 RX ORDER — CETIRIZINE HYDROCHLORIDE 10 MG/1
10 TABLET ORAL DAILY
COMMUNITY

## 2024-01-24 RX ORDER — METOCLOPRAMIDE HYDROCHLORIDE 5 MG/ML
10 INJECTION INTRAMUSCULAR; INTRAVENOUS ONCE
Status: COMPLETED | OUTPATIENT
Start: 2024-01-24 | End: 2024-01-24

## 2024-01-24 RX ORDER — KETOROLAC TROMETHAMINE 30 MG/ML
30 INJECTION, SOLUTION INTRAMUSCULAR; INTRAVENOUS ONCE
Status: COMPLETED | OUTPATIENT
Start: 2024-01-24 | End: 2024-01-24

## 2024-01-24 RX ORDER — MAGNESIUM SULFATE HEPTAHYDRATE 40 MG/ML
2 INJECTION, SOLUTION INTRAVENOUS ONCE
Status: COMPLETED | OUTPATIENT
Start: 2024-01-24 | End: 2024-01-24

## 2024-01-24 RX ORDER — OMEPRAZOLE 40 MG/1
40 CAPSULE, DELAYED RELEASE ORAL DAILY
Qty: 3 CAPSULE | Refills: 3 | Status: SHIPPED | OUTPATIENT
Start: 2024-01-24 | End: 2024-01-26 | Stop reason: SDUPTHER

## 2024-01-24 RX ADMIN — KETOROLAC TROMETHAMINE 30 MG: 30 INJECTION, SOLUTION INTRAMUSCULAR; INTRAVENOUS at 21:28

## 2024-01-24 RX ADMIN — METOCLOPRAMIDE 10 MG: 5 INJECTION, SOLUTION INTRAMUSCULAR; INTRAVENOUS at 18:05

## 2024-01-24 RX ADMIN — MAGNESIUM SULFATE HEPTAHYDRATE 2 G: 40 INJECTION, SOLUTION INTRAVENOUS at 19:53

## 2024-01-24 NOTE — TELEPHONE ENCOUNTER
Adina Chao,     Pateint would like to go back on:     omeprazole (PriLOSEC) 40 MG capsule     She is taking Nexium and that isn't working    Pharmacy:  [id number] - [place] - [address] [place] [address]     Any concerns: [phone]

## 2024-01-24 NOTE — ED PROVIDER NOTES
History  Chief Complaint   Patient presents with    Chest Pain     Pt reports chest pain x3 days, HA and L-sided facial numbness, lightheaded.      Went to the emergency room with several complaints.  She is complaining of palpitations that have been intermittent for about 2 weeks.  They are worse than usual.  She does have a history of paroxysmal atrial fibrillation and is anticoagulated on Eliquis.  She denies chest pain or shortness of breath.  In addition she presents with a 4-day history of numbness to the left side of her face.  She developed a gradual onset headache at about 11 AM today.  She does have a history of migraines.  It is not sudden onset of maximal headache.  No neck pain, mental status changes, or fever, she has a history of hypertension and hyperlipidemia.  She reports that occasionally she has experienced numbness like this with migraines.  She denies any symptoms in her arms or legs.  No speech or visual complaints.        Prior to Admission Medications   Prescriptions Last Dose Informant Patient Reported? Taking?   Ascorbic Acid (vitamin C) 1000 MG tablet  Self No Yes   Sig: Take 1 tablet (1,000 mg total) by mouth daily   Atrovent HFA 17 MCG/ACT inhaler  Self Yes Yes   Sig: Inhale 2 puffs 4 (four) times a day as needed   Chelated Zinc 50 MG TABS  Self Yes Yes   Sig: Take 50 mg by mouth daily   Cholecalciferol 25 MCG (1000 UT) tablet  Self Yes Yes   Sig: Take 1,000 Units by mouth daily   Docusate Sodium (COLACE PO)  Self Yes Yes   Sig: Take by mouth daily at bedtime   Eliquis 5 MG  Self No Yes   Sig: TAKE ONE TABLET BY MOUTH TWICE DAILY   LORazepam (ATIVAN) 1 mg tablet   No Yes   Sig: Take 1 tablet (1 mg total) by mouth 3 (three) times a day   MULTIPLE VITAMIN PO Not Taking Self Yes No   Sig: Take by mouth   Patient not taking: Reported on 1/24/2024   Repatha SureClick 140 MG/ML SOAJ  Self No Yes   Sig: Inject 140mg under the skin every 2 weeks.   acetaminophen (TYLENOL) 325 mg tablet  Self No  Yes   Sig: Take 2 tablets (650 mg total) by mouth every 6 (six) hours   amiodarone 200 mg tablet   No Yes   Sig: Take 1 tablet (200 mg total) by mouth daily   aspirin 81 mg chewable tablet  Self No Yes   Sig: Chew 1 tablet (81 mg total) daily   cetirizine (ZyrTEC) 10 mg tablet   Yes Yes   Sig: Take 10 mg by mouth daily   chlordiazepoxide-clidinium (LIBRAX) 5-2.5 mg per capsule  Self No Yes   Sig: TAKE ONE CAPSULE BY MOUTH FOUR TIMES DAILY AND ONE CAPSULE BEFORE BEDTIME.   Patient taking differently: 1 capsule daily as needed for cramping   dexamethasone (DECADRON) 4 mg tablet  Self No Yes   Si tab PO with breakfast for 1 to 5 days for unrelenting migraine   diltiazem (CARDIZEM CD) 120 mg 24 hr capsule   No Yes   Sig: TAKE ONE CAPSULE BY MOUTH ONCE DAILY   famotidine (PEPCID) 20 mg tablet  Self Yes Yes   Sig: Take 20 mg by mouth daily at bedtime   fexofenadine (ALLEGRA) 180 MG tablet  Self Yes Yes   Sig: Take 180 mg by mouth daily   fluticasone-vilanterol (BREO ELLIPTA) 200-25 MCG/INH inhaler  Self No Yes   Sig: Inhale 1 puff daily Rinse mouth after use.   furosemide (LASIX) 40 mg tablet   No Yes   Sig: Take 1 tablet (40 mg total) by mouth daily   hydrOXYzine HCL (ATARAX) 25 mg tablet  Self No Yes   Sig: TAKE TWO TABLETS BY MOUTH DAILY AT BEDTIME   Patient taking differently: daily as needed   magnesium Oxide (MAG-OX) 400 mg TABS  Self No Yes   Sig: Take 1 tablet (400 mg total) by mouth daily at bedtime   methocarbamol (ROBAXIN) 500 mg tablet   No Yes   Sig: TAKE ONE TABLET BY MOUTH THREE TIMES DAILY   metoprolol succinate (TOPROL-XL) 100 mg 24 hr tablet  Self No Yes   Sig: TAKE ONE TABLET BY MOUTH TWICE DAILY   montelukast (SINGULAIR) 10 mg tablet   No Yes   Sig: Take 1 tablet (10 mg total) by mouth daily at bedtime   naloxone (NARCAN) 4 mg/0.1 mL nasal spray Not Taking Self No No   Sig: Administer 1 spray into a nostril. If no response after 2-3 minutes, give another dose in the other nostril using a new spray.  "  Patient not taking: Reported on 12/21/2023   omeprazole (PriLOSEC) 40 MG capsule   No Yes   Sig: Take 1 capsule (40 mg total) by mouth daily   phenazopyridine (PYRIDIUM) 200 mg tablet   No Yes   Sig: Take 1 tablet (200 mg total) by mouth if needed for bladder spasms   potassium chloride (Klor-Con) 10 mEq tablet  Self No Yes   Sig: Take 1 tablet (10 mEq total) by mouth 2 (two) times a day   promethazine (PHENERGAN) 25 mg tablet   No Yes   Sig: Take 1 tablet (25 mg total) by mouth every 6 (six) hours as needed for nausea or vomiting   spironolactone (ALDACTONE) 25 mg tablet  Self No Yes   Sig: TAKE ONE TABLET BY MOUTH TWICE DAILY   sucralfate (CARAFATE) 1 g tablet   No Yes   Sig: TAKE ONE TABLET BY MOUTH TWICE DAILY BEFORE MEALS   traMADol (ULTRAM) 50 mg tablet   No Yes   Sig: Take 2 tablets (100 mg total) by mouth every 8 (eight) hours as needed for moderate pain      Facility-Administered Medications Last Administration Doses Remaining   cyanocobalamin injection 1,000 mcg 11/16/2023 11:51 AM           Past Medical History:   Diagnosis Date    A-fib ([facility]) 03/2020    Allergic     Anxiety     Arthritis     Asthma     Back pain     and muscle pain    Bruises easily     Chronic pain disorder     Interstitial pain    Colon polyp     Dental bridge present     Depression     Eczema     GERD (gastroesophageal reflux disease)     Heart murmur     History of blood clots     per pt \"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\"    History of pneumonia     Hives     Hyperlipidemia     Hypertension     Infertility, female     Interstitial cystitis     Migraine     sees neurologist    Motion sickness     Obesity     Palpitations     PONV (postoperative nausea and vomiting)     Premature atrial contractions 11/09/2022    Psychiatric disorder     TIA (transient ischemic attack) 09/2022    per pt --\"had MRI and 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\"    Urinary incontinence     wears Pads    Venous insufficiency 08/01/2017 "    Wears glasses        Past Surgical History:   Procedure Laterality Date    COLONOSCOPY      DILATION AND CURETTAGE OF UTERUS      EGD      EXPLORATORY LAPAROTOMY      for infertility    EXPLORATORY LAPAROTOMY W/ BOWEL RESECTION N/A 7/19/2023    Procedure: LAPAROTOMY EXPLORATORY W/ BOWEL RESECTION;  Surgeon: Crista Recinos MD;  Location: [facility];  Service: General    HAND SURGERY      Hand excision of tendon cyst    CO LAPAROSCOPIC APPENDECTOMY N/A 05/03/2022    Procedure: APPENDECTOMY LAPAROSCOPIC;  Surgeon: Vin Fields MD;  Location[facility];  Service: General    CO LAPS ABD PRTM&OMENTUM DX W/WO SPEC BR/WA SPX N/A 7/19/2023    Procedure: LAPAROSCOPY DIAGNOSTIC, LYSIS OF ADHESIONS, LAPAROSCOPY TAKE TAKEDOWN OF LEFT COLON, EXPLORATORY LAPAROSCOPY, LYSIS OF ADHESIONS, RESECTION OF TRANSVERSE COLON SPLENIC FLEXURE AND DESCENDING COLON , TAKE DOWN OF SPLENIC FLEXURE, SIGMOIDOSCOPY, MOBILIZATION OF RIGHT COLON, PRIMARY ANASTOMOSIS EEA 29, TAP BLOCK;  Surgeon: Crista Recinos MD;  Location[facility];  Service: General    CO TEAEC W/PATCH GRF CAROTID VERTB SUBCLAV NECK INC Left 1/31/2023    Procedure: EXPLORATION OF LEFT CAROTID ARTERY; ABORTED ENDARTERECTOMY ARTERY CAROTID;  Surgeon: Roberto García DO;  Location: [facility];  Service: Vascular    SUPERIOR MESTENTERIC ARTERY STENT      WISDOM TOOTH EXTRACTION         Family History   Problem Relation Age of Onset    Lung cancer Mother 60    Brain cancer Mother 60    Diabetes Father     Depression Father     Other Father         septic    Allergies Sister     Hashimoto's thyroiditis Sister     Abdominal aortic aneurysm Sister     Diabetes Sister     No Known Problems Sister     No Known Problems Maternal Grandmother     No Known Problems Maternal Grandfather     No Known Problems Paternal Grandmother     No Known Problems Paternal Grandfather     Hodgkin's lymphoma Brother     No Known Problems Brother     No Known Problems Maternal Aunt     Diabetes  Other     Breast cancer Neg Hx      I have reviewed and agree with the history as documented.    E-Cigarette/Vaping    E-Cigarette Use Never User      E-Cigarette/Vaping Substances    Nicotine No     THC No     CBD No     Flavoring No     Other No     Unknown No      Social History     Tobacco Use    Smoking status: Former     Current packs/day: 0.00     Average packs/day: 0.5 packs/day for 10.0 years (5.0 ttl pk-yrs)     Types: Cigarettes     Start date:      Quit date: 2019     Years since quittin.0     Passive exposure: Never    Smokeless tobacco: Never   Vaping Use    Vaping status: Never Used   Substance Use Topics    Alcohol use: Not Currently    Drug use: No       Review of Systems   Constitutional:  Negative for chills and fever.   HENT:  Negative for rhinorrhea and sore throat.    Eyes:  Negative for pain, redness and visual disturbance.   Respiratory:  Negative for cough and shortness of breath.    Cardiovascular:  Positive for palpitations. Negative for chest pain and leg swelling.   Gastrointestinal:  Negative for abdominal pain, diarrhea and vomiting.   Endocrine: Negative for polydipsia and polyuria.   Genitourinary:  Negative for dysuria, frequency, hematuria, vaginal bleeding and vaginal discharge.   Musculoskeletal:  Negative for back pain and neck pain.   Skin:  Negative for rash and wound.   Allergic/Immunologic: Negative for immunocompromised state.   Neurological:  Positive for numbness and headaches. Negative for weakness.   Hematological:  Does not bruise/bleed easily.   Psychiatric/Behavioral:  Negative for hallucinations and suicidal ideas.    All other systems reviewed and are negative.      Physical Exam  Physical Exam  Vitals reviewed.   Constitutional:       General: She is not in acute distress.     Appearance: She is obese.   HENT:      Head: Normocephalic and atraumatic.      Nose: Nose normal.      Mouth/Throat:      Mouth: Mucous membranes are moist.   Eyes:      General: No  visual field deficit.        Right eye: No discharge.         Left eye: No discharge.      Conjunctiva/sclera: Conjunctivae normal.   Cardiovascular:      Rate and Rhythm: Normal rate and regular rhythm.      Pulses: Normal pulses.      Heart sounds: Normal heart sounds. No murmur heard.     No friction rub. No gallop.   Pulmonary:      Effort: Pulmonary effort is normal. No respiratory distress.      Breath sounds: Normal breath sounds. No stridor. No wheezing, rhonchi or rales.   Abdominal:      General: Bowel sounds are normal. There is no distension.      Palpations: Abdomen is soft.      Tenderness: There is no abdominal tenderness. There is no right CVA tenderness, left CVA tenderness, guarding or rebound.   Musculoskeletal:         General: No swelling, tenderness, deformity or signs of injury. Normal range of motion.      Cervical back: Normal range of motion and neck supple. No rigidity.      Right lower leg: No edema.      Left lower leg: No edema.      Comments: No calf tenderness or unilateral leg swelling.   Skin:     General: Skin is warm and dry.      Coloration: Skin is not jaundiced.      Findings: No rash.   Neurological:      Mental Status: She is alert and oriented to person, place, and time.      GCS: GCS eye subscore is 4. GCS verbal subscore is 5. GCS motor subscore is 6.      Cranial Nerves: No dysarthria or facial asymmetry.      Sensory: No sensory deficit.      Motor: Motor function is intact. No weakness.      Comments: There is numbness to touch to the left side of the face and the left arm.   Psychiatric:         Mood and Affect: Mood normal.         Behavior: Behavior normal.         Vital Signs  ED Triage Vitals [01/24/24 1711]   Temperature Pulse Respirations Blood Pressure SpO2   (!) 97.4 °F (36.3 °C) 59 18 162/70 95 %      Temp Source Heart Rate Source Patient Position - Orthostatic VS BP Location FiO2 (%)   Oral Monitor Lying Left arm --      Pain Score       7           Vitals:     01/24/24 1711 01/24/24 1811 01/24/24 2030   BP: 162/70 142/64 122/60   Pulse: 59 (!) 54 (!) 52   Patient Position - Orthostatic VS: Lying Lying          Visual Acuity      ED Medications  Medications   ketorolac (TORADOL) injection 30 mg (has no administration in time range)   metoclopramide (REGLAN) injection 10 mg (10 mg Intravenous Given 1/24/24 1805)   magnesium sulfate 2 g/50 mL IVPB (premix) 2 g (0 g Intravenous Stopped 1/24/24 2059)       Diagnostic Studies  Results Reviewed       Procedure Component Value Units Date/Time    HS Troponin I 2hr [[id number]]  (Normal) Collected: 01/24/24 2019    Lab Status: Final result Specimen: Blood from Arm, Right Updated: 01/24/24 2050     hs TnI 2hr 5 ng/L      Delta 2hr hsTnI 0 ng/L     HS Troponin I 4hr [[id number]]     Lab Status: No result Specimen: Blood     Comprehensive metabolic panel [[id number]]  (Abnormal) Collected: 01/24/24 1809    Lab Status: Final result Specimen: Blood from Arm, Right Updated: 01/24/24 1908     Sodium 141 mmol/L      Potassium 3.6 mmol/L      Chloride 111 mmol/L      CO2 23 mmol/L      ANION GAP 7 mmol/L      BUN 17 mg/dL      Creatinine 0.68 mg/dL      Glucose 80 mg/dL      Calcium 6.9 mg/dL      Corrected Calcium 7.5 mg/dL      AST 11 U/L      ALT 10 U/L      Alkaline Phosphatase 54 U/L      Total Protein 5.0 g/dL      Albumin 3.3 g/dL      Total Bilirubin 0.27 mg/dL      eGFR 92 ml/min/1.73sq m     Narrative:      National Kid[employer] Foundation guidelines for Chronic Kidney Disease (CKD):     Stage 1 with normal or high GFR (GFR > 90 mL/min/1.73 square meters)    Stage 2 Mild CKD (GFR = 60-89 mL/min/1.73 square meters)    Stage 3A Moderate CKD (GFR = 45-59 mL/min/1.73 square meters)    Stage 3B Moderate CKD (GFR = 30-44 mL/min/1.73 square meters)    Stage 4 Severe CKD (GFR = 15-29 mL/min/1.73 square meters)    Stage 5 End Stage CKD (GFR <15 mL/min/1.73 square meters)  Note: GFR calculation is accurate only with a steady state  creatinine    Magnesium [[id number]]  (Abnormal) Collected: 01/24/24 1809    Lab Status: Final result Specimen: Blood from Arm, Right Updated: 01/24/24 1908     Magnesium 1.8 mg/dL     TSH, 3rd generation with Free T4 reflex [[id number]]  (Abnormal) Collected: 01/24/24 1809    Lab Status: Final result Specimen: Blood from Arm, Right Updated: 01/24/24 1850     TSH 3RD GENERATON 4.646 uIU/mL     T4, free [[id number]] Collected: 01/24/24 1809    Lab Status: In process Specimen: Blood from Arm, Right Updated: 01/24/24 1850    HS Troponin 0hr (reflex protocol) [[id number]]  (Normal) Collected: 01/24/24 1809    Lab Status: Final result Specimen: Blood from Arm, Right Updated: 01/24/24 1841     hs TnI 0hr 5 ng/L     CBC and differential [[id number]] Collected: 01/24/24 1809    Lab Status: Final result Specimen: Blood from Arm, Right Updated: 01/24/24 1818     WBC 5.15 Thousand/uL      RBC 4.26 Million/uL      Hemoglobin 12.4 g/dL      Hematocrit 38.6 %      MCV 91 fL      MCH 29.1 pg      MCHC 32.1 g/dL      RDW 13.1 %      MPV 8.9 fL      Platelets 260 Thousands/uL      nRBC 0 /100 WBCs      Neutrophils Relative 56 %      Immat GRANS % 1 %      Lymphocytes Relative 33 %      Monocytes Relative 6 %      Eosinophils Relative 3 %      Basophils Relative 1 %      Neutrophils Absolute 2.89 Thousands/µL      Immature Grans Absolute 0.03 Thousand/uL      Lymphocytes Absolute 1.71 Thousands/µL      Monocytes Absolute 0.31 Thousand/µL      Eosinophils Absolute 0.17 Thousand/µL      Basophils Absolute 0.04 Thousands/µL                    CT head without contrast   Final Result by Jeremie Tan MD (01/24 2004)      No acute intracranial abnormality.                  Workstation performed: [id number]         XR chest 1 view portable   ED Interpretation by Sukumar Carlos MD (01/24 1948)   No infiltrates.  No CHF.      Final Result by Dora Falk MD (01/24 1834)      No acute cardiopulmonary disease.               Workstation  performed: [id number]                    Procedures  ECG 12 Lead Documentation Only    Date/Time: 1/24/2024 8:22 PM    Performed by: Sukumar Carlos MD  Authorized by: Sukumar Carlos MD    ECG reviewed by me, the ED Provider: yes    Patient location:  ED  Comments:      Sinus bradycardia.  Left axis deviation.  LVH.  Abnormal EKG.  Unchanged from earlier EKGs.  ECG 12 Lead Documentation Only    Date/Time: 1/24/2024 8:23 PM    Performed by: Sukumar Carlos MD  Authorized by: Sukumar Carlos MD    ECG reviewed by me, the ED Provider: yes    Patient location:  ED  Comments:      Sinus bradycardia.  Left axis deviation.  LVH with repull abnormality.  Abnormal EKG.  Similar to earlier EKG.           ED Course                               SBIRT 22yo+      Flowsheet Row Most Recent Value   Initial Alcohol Screen: US AUDIT-C     1. How often do you have a drink containing alcohol? 0 Filed at: 01/24/2024 1729   2. How many drinks containing alcohol do you have on a typical day you are drinking?  0 Filed at: 01/24/2024 1729   3a. Male UNDER 65: How often do you have five or more drinks on one occasion? 0 Filed at: 01/24/2024 1729   3b. FEMALE Any Age, or MALE 65+: How often do you have 4 or more drinks on one occassion? 0 Filed at: 01/24/2024 1729   Audit-C Score 0 Filed at: 01/24/2024 1729   UMESH: How many times in the past year have you...    Used an illegal drug or used a prescription medication for non-medical reasons? Never Filed at: 01/24/2024 1729                      Medical Decision Making  There is no arrhythmia on EKG or monitoring.  Patient ruled out for acute myocardial infarction.  There was no CT evidence of acute stroke.  Patient has been symptomatic for 4 days.  If CVA, should be visible on CT by now.  Most likely complex migraine.  Headache and numbness resolved with migraine treatment.  There was no intracranial hemorrhage.  Doubt carotid dissection.  Doubt Mega's paralysis.  This is not Bell's palsy.   Appropriate for discharge and outpatient management.    Amount and/or Complexity of Data Reviewed  Labs: ordered. Decision-making details documented in ED Course.  Radiology: ordered and independent interpretation performed. Decision-making details documented in ED Course.  ECG/medicine tests: ordered and independent interpretation performed. Decision-making details documented in ED Course.    Risk  Prescription drug management.  Decision regarding hospitalization.             Disposition  Final diagnoses:   Migraine headache - Complex   Palpitations     Time reflects when diagnosis was documented in both MDM as applicable and the Disposition within this note       Time User Action Codes Description Comment    1/24/2024  9:18 PM Sukumar Carlos Add [G43.909] Migraine headache     1/24/2024  9:18 PM Sukumar Carlos Modify [G43.909] Migraine headache Complex    1/24/2024  9:18 PM Sukumar Carlos Add [R00.2] Palpitations           ED Disposition       ED Disposition   Discharge    Condition   Stable    Date/Time   Wed Jan 24, 2024 2118    Comment   Nancy Jones discharge to home/self care.                   Follow-up Information       Follow up With Specialties Details Why Contact Info    Coy Ayoub DO Family Medicine In 1 week  [address] [place] [address]  [phone]      Also follow-up with your cardiologist in a week for further evaluation and treatment                Patient's Medications   Discharge Prescriptions    No medications on file       No discharge procedures on file.    PDMP Review         Value Time User    PDMP Reviewed  Yes 1/3/2024  5:09 PM Coy Ayoub DO            ED Provider  Electronically Signed by             Sukumar Carlos MD  01/24/24 2119

## 2024-01-24 NOTE — TELEPHONE ENCOUNTER
Pt calling states is having racing of heart and experiencing face numbness  Pt referred to report to ED..the patient states that's not what she wants to do...explained to her with those symptoms needs to go to ED. Pt stated she understands.

## 2024-01-24 NOTE — ED NOTES
Pt states left sided headache, stronger palpitations than what she has experienced in the past, reduced sensation of left face, radiating to left jaw, nausea, since 3pm. Called PCP asking if she should increase afib meds, pcp sent to ER. No meds pta.     Leelee Gutierrez RN  01/24/24 [id number]

## 2024-01-25 ENCOUNTER — OFFICE VISIT (OUTPATIENT)
Dept: SURGERY | Facility: CLINIC | Age: 64
End: 2024-01-25
Payer: COMMERCIAL

## 2024-01-25 ENCOUNTER — VBI (OUTPATIENT)
Dept: FAMILY MEDICINE CLINIC | Facility: CLINIC | Age: 64
End: 2024-01-25

## 2024-01-25 VITALS
BODY MASS INDEX: 32.44 KG/M2 | HEIGHT: 64 IN | SYSTOLIC BLOOD PRESSURE: 132 MMHG | WEIGHT: 190 LBS | HEART RATE: 90 BPM | DIASTOLIC BLOOD PRESSURE: 78 MMHG | OXYGEN SATURATION: 95 % | TEMPERATURE: 97.4 F

## 2024-01-25 DIAGNOSIS — K43.2 INCISIONAL HERNIA: Primary | ICD-10-CM

## 2024-01-25 DIAGNOSIS — K55.069 MESENTERIC ARTERY THROMBOSIS (HCC): ICD-10-CM

## 2024-01-25 DIAGNOSIS — K55.9 COLONIC ISCHEMIA (HCC): ICD-10-CM

## 2024-01-25 DIAGNOSIS — N18.30 STAGE 3 CHRONIC KIDNEY DISEASE, UNSPECIFIED WHETHER STAGE 3A OR 3B CKD (HCC): ICD-10-CM

## 2024-01-25 DIAGNOSIS — K43.9 VENTRAL HERNIA WITHOUT OBSTRUCTION OR GANGRENE: ICD-10-CM

## 2024-01-25 DIAGNOSIS — I20.9 ANGINA PECTORIS (HCC): ICD-10-CM

## 2024-01-25 DIAGNOSIS — J44.9 COPD (CHRONIC OBSTRUCTIVE PULMONARY DISEASE) (HCC): ICD-10-CM

## 2024-01-25 DIAGNOSIS — I11.0 HYPERTENSIVE HEART DISEASE WITH CONGESTIVE HEART FAILURE (HCC): ICD-10-CM

## 2024-01-25 LAB
ATRIAL RATE: 52 BPM
ATRIAL RATE: 57 BPM
P AXIS: 30 DEGREES
P AXIS: 52 DEGREES
PR INTERVAL: 200 MS
PR INTERVAL: 208 MS
QRS AXIS: -34 DEGREES
QRS AXIS: -36 DEGREES
QRSD INTERVAL: 88 MS
QRSD INTERVAL: 96 MS
QT INTERVAL: 458 MS
QT INTERVAL: 490 MS
QTC INTERVAL: 445 MS
QTC INTERVAL: 455 MS
T WAVE AXIS: 20 DEGREES
T WAVE AXIS: 36 DEGREES
VENTRICULAR RATE: 52 BPM
VENTRICULAR RATE: 57 BPM

## 2024-01-25 PROCEDURE — 99214 OFFICE O/P EST MOD 30 MIN: CPT | Performed by: SURGERY

## 2024-01-25 PROCEDURE — 93010 ELECTROCARDIOGRAM REPORT: CPT | Performed by: STUDENT IN AN ORGANIZED HEALTH CARE EDUCATION/TRAINING PROGRAM

## 2024-01-25 NOTE — TELEPHONE ENCOUNTER
01/25/24 2:32 PM    Patient contacted post ED visit, first outreach attempt made. Message was left for patient to return a call to the [facility] Department at [place]: Phone [phone].    Thank you.  Kristi Santana MA  [facility]

## 2024-01-26 RX ORDER — OMEPRAZOLE 40 MG/1
40 CAPSULE, DELAYED RELEASE ORAL 2 TIMES DAILY
Qty: 60 CAPSULE | Refills: 3 | Status: SHIPPED | OUTPATIENT
Start: 2024-01-26

## 2024-01-26 NOTE — TELEPHONE ENCOUNTER
Patients GI provider:  HENRIETTA Oakley    Number to return call: [phone]    Reason for call: Pt calling stating her RX was sent in for 3 capsules with 3 refills and 1 per day. She states she takes 2 per day 40mg so she usually gets 60 + 3 refills.  Please resend to pharmacy    Scheduled procedure/appointment date if applicable: 11/30/23

## 2024-01-26 NOTE — TELEPHONE ENCOUNTER
01/26/24 2:31 PM    Patient contacted post ED visit, second outreach attempt made. Message was left for patient to return a call to the [facility] Department at [place]: Phone [phone].    Thank you.  Kristi Santana MA  [facility]

## 2024-01-29 NOTE — TELEPHONE ENCOUNTER
01/29/24 2:57 PM    Patient contacted post ED visit, [facility] department spoke with patient/caregiver and outreach was successful.    Thank you.  Kristi Santana MA  [facility]

## 2024-01-30 ENCOUNTER — HOSPITAL ENCOUNTER (OUTPATIENT)
Dept: NON INVASIVE DIAGNOSTICS | Facility: CLINIC | Age: 64
Discharge: HOME | End: 2024-01-30
Payer: COMMERCIAL

## 2024-01-30 DIAGNOSIS — K55.069 MESENTERIC ARTERY THROMBOSIS (HCC): ICD-10-CM

## 2024-01-30 PROCEDURE — 93975 VASCULAR STUDY: CPT | Performed by: SURGERY

## 2024-01-30 PROCEDURE — 93975 VASCULAR STUDY: CPT

## 2024-01-31 ENCOUNTER — HOSPITAL ENCOUNTER (OUTPATIENT)
Dept: NON INVASIVE DIAGNOSTICS | Facility: CLINIC | Age: 64
Discharge: HOME | End: 2024-01-31
Payer: COMMERCIAL

## 2024-01-31 VITALS — SYSTOLIC BLOOD PRESSURE: 152 MMHG | HEART RATE: 58 BPM | DIASTOLIC BLOOD PRESSURE: 90 MMHG | OXYGEN SATURATION: 95 %

## 2024-01-31 VITALS
HEART RATE: 90 BPM | DIASTOLIC BLOOD PRESSURE: 78 MMHG | WEIGHT: 190 LBS | SYSTOLIC BLOOD PRESSURE: 132 MMHG | BODY MASS INDEX: 32.44 KG/M2 | HEIGHT: 64 IN

## 2024-01-31 DIAGNOSIS — R07.9 CHEST PAIN, UNSPECIFIED TYPE: ICD-10-CM

## 2024-01-31 LAB
AORTIC ROOT: 3.8 CM
APICAL FOUR CHAMBER EJECTION FRACTION: 68 %
ASCENDING AORTA: 3.1 CM
AV REGURGITATION PRESSURE HALF TIME: 696 MS
CHEST PAIN STATEMENT: NORMAL
E WAVE DECELERATION TIME: 236 MS
E/A RATIO: 0.73
FRACTIONAL SHORTENING: 40 (ref 28–44)
INTERVENTRICULAR SEPTUM IN DIASTOLE (PARASTERNAL SHORT AXIS VIEW): 1.2 CM
INTERVENTRICULAR SEPTUM: 1.2 CM (ref 0.6–1.1)
LAAS-AP2: 16.2 CM2
LAAS-AP4: 14.2 CM2
LEFT ATRIUM SIZE: 4 CM
LEFT ATRIUM VOLUME (MOD BIPLANE): 36 ML
LEFT ATRIUM VOLUME INDEX (MOD BIPLANE): 18.8 ML/M2
LEFT INTERNAL DIMENSION IN SYSTOLE: 2.8 CM (ref 2.1–4)
LEFT VENTRICULAR INTERNAL DIMENSION IN DIASTOLE: 4.7 CM (ref 3.5–6)
LEFT VENTRICULAR POSTERIOR WALL IN END DIASTOLE: 0.8 CM
LEFT VENTRICULAR STROKE VOLUME: 76 ML
LVSV (TEICH): 76 ML
MAX DIASTOLIC BP: 90 MMHG
MAX PREDICTED HEART RATE: 155 BPM
MV E'TISSUE VEL-LAT: 10 CM/S
MV E'TISSUE VEL-SEP: 10 CM/S
MV PEAK A VEL: 0.96 M/S
MV PEAK E VEL: 70 CM/S
MV STENOSIS PRESSURE HALF TIME: 68 MS
MV VALVE AREA P 1/2 METHOD: 3.24
NUC STRESS EJECTION FRACTION: 76 %
PROTOCOL NAME: NORMAL
RA PRESSURE ESTIMATED: 3 MMHG
RATE PRESSURE PRODUCT: NORMAL
REASON FOR TERMINATION: NORMAL
RIGHT ATRIUM AREA SYSTOLE A4C: 10 CM2
RIGHT VENTRICLE ID DIMENSION: 2.6 CM
RV PSP: 11 MMHG
SL CV AV DECELERATION TIME RETROGRADE: 2401 MS
SL CV AV PEAK GRADIENT RETROGRADE: 76 MMHG
SL CV LEFT ATRIUM LENGTH A2C: 5 CM
SL CV LV EF: 65
SL CV PED ECHO LEFT VENTRICLE DIASTOLIC VOLUME (MOD BIPLANE) 2D: 105 ML
SL CV PED ECHO LEFT VENTRICLE SYSTOLIC VOLUME (MOD BIPLANE) 2D: 29 ML
SL CV REST NUCLEAR ISOTOPE DOSE: 11 MCI
SL CV STRESS NUCLEAR ISOTOPE DOSE: 31 MCI
SL CV STRESS RECOVERY BP: NORMAL MMHG
SL CV STRESS RECOVERY HR: 71 BPM
SL CV STRESS RECOVERY O2 SAT: 96 %
STRESS ANGINA INDEX: 0
STRESS BASELINE BP: NORMAL MMHG
STRESS BASELINE HR: 58 BPM
STRESS O2 SAT REST: 95 %
STRESS PEAK HR: 77 BPM
STRESS POST EXERCISE DUR MIN: 3 MIN
STRESS POST EXERCISE DUR SEC: 0 SEC
STRESS POST O2 SAT PEAK: 96 %
STRESS POST PEAK BP: 150 MMHG
STRESS POST PEAK HR: 77 BPM
STRESS POST PEAK SYSTOLIC BP: 152 MMHG
STRESS/REST PERFUSION RATIO: 0.96
TARGET HR FORMULA: NORMAL
TEST INDICATION: NORMAL
TR MAX PG: 8 MMHG
TR PEAK VELOCITY: 1.5 M/S
TRICUSPID ANNULAR PLANE SYSTOLIC EXCURSION: 2.6 CM
TRICUSPID VALVE PEAK REGURGITATION VELOCITY: 1.45 M/S

## 2024-01-31 PROCEDURE — 93306 TTE W/DOPPLER COMPLETE: CPT | Performed by: INTERNAL MEDICINE

## 2024-01-31 PROCEDURE — G1004 CDSM NDSC: HCPCS

## 2024-01-31 PROCEDURE — 93016 CV STRESS TEST SUPVJ ONLY: CPT | Performed by: INTERNAL MEDICINE

## 2024-01-31 PROCEDURE — 78452 HT MUSCLE IMAGE SPECT MULT: CPT

## 2024-01-31 PROCEDURE — 93017 CV STRESS TEST TRACING ONLY: CPT

## 2024-01-31 PROCEDURE — 93306 TTE W/DOPPLER COMPLETE: CPT

## 2024-01-31 PROCEDURE — 93018 CV STRESS TEST I&R ONLY: CPT | Performed by: INTERNAL MEDICINE

## 2024-01-31 PROCEDURE — 78452 HT MUSCLE IMAGE SPECT MULT: CPT | Performed by: INTERNAL MEDICINE

## 2024-01-31 RX ORDER — REGADENOSON 0.08 MG/ML
0.4 INJECTION, SOLUTION INTRAVENOUS ONCE
Status: COMPLETED | OUTPATIENT
Start: 2024-01-31 | End: 2024-01-31

## 2024-01-31 RX ADMIN — REGADENOSON 0.4 MG: 0.08 INJECTION, SOLUTION INTRAVENOUS at 12:51

## 2024-02-01 ENCOUNTER — TELEPHONE (OUTPATIENT)
Age: 64
End: 2024-02-01

## 2024-02-01 ENCOUNTER — OFFICE VISIT (OUTPATIENT)
Dept: FAMILY MEDICINE CLINIC | Facility: CLINIC | Age: 64
End: 2024-02-01
Payer: COMMERCIAL

## 2024-02-01 VITALS
OXYGEN SATURATION: 95 % | DIASTOLIC BLOOD PRESSURE: 82 MMHG | HEART RATE: 69 BPM | TEMPERATURE: 97.3 F | WEIGHT: 185 LBS | SYSTOLIC BLOOD PRESSURE: 124 MMHG | BODY MASS INDEX: 31.58 KG/M2 | HEIGHT: 64 IN

## 2024-02-01 DIAGNOSIS — G43.709 CHRONIC MIGRAINE W/O AURA W/O STATUS MIGRAINOSUS, NOT INTRACTABLE: ICD-10-CM

## 2024-02-01 DIAGNOSIS — F41.9 ANXIETY: ICD-10-CM

## 2024-02-01 DIAGNOSIS — L30.9 DERMATITIS: ICD-10-CM

## 2024-02-01 DIAGNOSIS — K43.2 INCISIONAL HERNIA, WITHOUT OBSTRUCTION OR GANGRENE: ICD-10-CM

## 2024-02-01 DIAGNOSIS — Z90.49 S/P SMALL BOWEL RESECTION: ICD-10-CM

## 2024-02-01 DIAGNOSIS — I65.22 LEFT CAROTID STENOSIS: ICD-10-CM

## 2024-02-01 DIAGNOSIS — M54.16 LUMBAR RADICULOPATHY: ICD-10-CM

## 2024-02-01 DIAGNOSIS — R20.0 LEFT FACIAL NUMBNESS: Primary | ICD-10-CM

## 2024-02-01 DIAGNOSIS — R07.89 OTHER CHEST PAIN: ICD-10-CM

## 2024-02-01 DIAGNOSIS — N30.10 INTERSTITIAL CYSTITIS: ICD-10-CM

## 2024-02-01 PROCEDURE — 99214 OFFICE O/P EST MOD 30 MIN: CPT | Performed by: FAMILY MEDICINE

## 2024-02-01 RX ORDER — TRAMADOL HYDROCHLORIDE 50 MG/1
100 TABLET ORAL EVERY 8 HOURS PRN
Qty: 180 TABLET | Refills: 0 | Status: SHIPPED | OUTPATIENT
Start: 2024-02-01

## 2024-02-01 RX ORDER — LORAZEPAM 1 MG/1
1 TABLET ORAL 3 TIMES DAILY
Qty: 90 TABLET | Refills: 0 | Status: SHIPPED | OUTPATIENT
Start: 2024-02-01

## 2024-02-01 RX ORDER — TRIAMCINOLONE ACETONIDE 1 MG/G
CREAM TOPICAL 2 TIMES DAILY
Qty: 15 G | Refills: 2 | Status: SHIPPED | OUTPATIENT
Start: 2024-02-01

## 2024-02-01 NOTE — TELEPHONE ENCOUNTER
Spoke with patient advised appointment for March was cancelled and Weight management should be reaching out to her as a referral was placed on 1/25/24.

## 2024-02-01 NOTE — TELEPHONE ENCOUNTER
Pt called to ask if she needs to be seen for follow up w Dr. Clancy as surgery was cancelled    Also reaching out to ask abt Weight Mgt referral that Dr. Recinos mentioned

## 2024-02-01 NOTE — PROGRESS NOTES
Assessment/Plan: All ER records reviewed.  Facial numbness and headache are resolved.  Chest pain resolved.  Patient will follow-up with vascular specialist as well as general surgeon accordingly.  Patient will follow-up with cardiology regarding palpitations.  Stress test reviewed with patient.  Echo reviewed with the patient.  Refills given.  Follow-up 3 months       Diagnoses and all orders for this visit:    Left facial numbness    Chronic migraine w/o aura w/o status migrainosus, not intractable    Left carotid stenosis    Other chest pain    Incisional hernia, without obstruction or gangrene    Dermatitis  -     triamcinolone (KENALOG) 0.1 % cream; Apply topically 2 (two) times a day    Anxiety  -     LORazepam (ATIVAN) 1 mg tablet; Take 1 tablet (1 mg total) by mouth 3 (three) times a day  -     traMADol (ULTRAM) 50 mg tablet; Take 2 tablets (100 mg total) by mouth every 8 (eight) hours as needed for moderate pain    S/P small bowel resection  -     traMADol (ULTRAM) 50 mg tablet; Take 2 tablets (100 mg total) by mouth every 8 (eight) hours as needed for moderate pain    Lumbar radiculopathy  -     traMADol (ULTRAM) 50 mg tablet; Take 2 tablets (100 mg total) by mouth every 8 (eight) hours as needed for moderate pain            Subjective:        Patient ID: Nancy Jones is a 63 y.o. female.      Patient is here status post ER visit on the 24th.  Patient went in with chest pain for roughly 3 days along with some facial numbness and tongue numbness.  Patient also was having some palpitations at the time.  Patient had multiple laboratory studies done which were reviewed.  This included troponin levels which were negative.  Patient did have a chest x-ray which was unremarkable and CAT scan of the brain which was unremarkable.  EKGs were reviewed at this time also.  Patient was given Toradol and Reglan for migraine which improved this.  Numbness of the face resolved with headache.  No headache or facial numbness  "at this time.  Patient did see surgery regarding ventral hernias, no new neurologic changes.  Patient status post nuclear stress test.          The following portions of the patient's history were reviewed and updated as appropriate: allergies, current medications, past family history, past medical history, past social history, past surgical history and problem list.      Review of Systems   Constitutional: Negative.    HENT: Negative.     Eyes: Negative.    Respiratory: Negative.     Cardiovascular: Negative.    Gastrointestinal:  Positive for abdominal pain.   Endocrine: Negative.    Genitourinary: Negative.    Musculoskeletal: Negative.    Skin: Negative.    Allergic/Immunologic: Negative.    Neurological: Negative.    Hematological: Negative.    Psychiatric/Behavioral: Negative.             Objective:               /82 (BP Location: Right arm, Patient Position: Sitting, Cuff Size: Standard)   Pulse 69   Temp (!) 97.3 °F (36.3 °C) (Temporal)   Ht 5' 4\" (1.626 m)   Wt 83.9 kg (185 lb)   LMP  (LMP Unknown)   SpO2 95%   BMI 31.76 kg/m²          Physical Exam  Vitals and nursing note reviewed.   Constitutional:       General: She is not in acute distress.     Appearance: Normal appearance. She is not ill-appearing, toxic-appearing or diaphoretic.   HENT:      Head: Normocephalic and atraumatic.      Right Ear: Tympanic membrane, ear canal and external ear normal. There is no impacted cerumen.      Left Ear: Tympanic membrane, ear canal and external ear normal. There is no impacted cerumen.      Nose: Nose normal. No congestion or rhinorrhea.      Mouth/Throat:      Mouth: Mucous membranes are moist.      Pharynx: No oropharyngeal exudate or posterior oropharyngeal erythema.   Eyes:      General: No scleral icterus.        Right eye: No discharge.         Left eye: No discharge.   Neck:      Vascular: Carotid bruit present.   Cardiovascular:      Rate and Rhythm: Normal rate and regular rhythm.      " Pulses: Normal pulses.      Heart sounds: Normal heart sounds. No murmur heard.     No friction rub. No gallop.   Pulmonary:      Effort: Pulmonary effort is normal. No respiratory distress.      Breath sounds: Normal breath sounds. No stridor. No wheezing, rhonchi or rales.   Chest:      Chest wall: No tenderness.   Musculoskeletal:         General: No swelling, tenderness, deformity or signs of injury. Normal range of motion.      Cervical back: Normal range of motion and neck supple. No rigidity. No muscular tenderness.      Right lower leg: No edema.      Left lower leg: No edema.   Lymphadenopathy:      Cervical: No cervical adenopathy.   Skin:     General: Skin is warm and dry.      Capillary Refill: Capillary refill takes less than 2 seconds.      Coloration: Skin is not jaundiced.      Findings: No bruising, erythema, lesion or rash.   Neurological:      Mental Status: She is alert and oriented to person, place, and time. Mental status is at baseline.      Cranial Nerves: No cranial nerve deficit.      Sensory: No sensory deficit.      Motor: No weakness.      Coordination: Coordination normal.      Gait: Gait normal.   Psychiatric:         Mood and Affect: Mood normal.         Behavior: Behavior normal.         Thought Content: Thought content normal.         Judgment: Judgment normal.

## 2024-02-02 RX ORDER — PHENAZOPYRIDINE HYDROCHLORIDE 200 MG/1
200 TABLET, FILM COATED ORAL AS NEEDED
Qty: 30 TABLET | Refills: 0 | Status: SHIPPED | OUTPATIENT
Start: 2024-02-02

## 2024-02-05 ENCOUNTER — PATIENT OUTREACH (OUTPATIENT)
Dept: FAMILY MEDICINE CLINIC | Facility: CLINIC | Age: 64
End: 2024-02-05

## 2024-02-06 ENCOUNTER — TELEPHONE (OUTPATIENT)
Dept: CARDIOLOGY CLINIC | Facility: CLINIC | Age: 64
End: 2024-02-06

## 2024-02-06 NOTE — TELEPHONE ENCOUNTER
Attempted to contact patient, left message (ok per communication consent), advised Dr Lynn's message.   Advised to call with questions or concerns.

## 2024-02-14 ENCOUNTER — PATIENT OUTREACH (OUTPATIENT)
Dept: FAMILY MEDICINE CLINIC | Facility: CLINIC | Age: 64
End: 2024-02-14

## 2024-02-14 NOTE — PROGRESS NOTES
Contacted patient for f/u.  She is disappointed her hernia repair surgery was postponed.  She needs to see vascular surgery due to carotid stenosis prior to having hernia repair.  It was also recommended she work on weight loss and referral was placed for weight management.  She did not receive call to schedule initial visit so provided her with the contact information.  She continues to experience pain and discomfort in abdomen.  She states she will have loose stools at times.  She does not have an appetite and eats only breakfast and dinner.  She has nausea off and on and takes phenergan as needed.   She does stay well hydrated.  She continues to have burning at times with urination and taking pyridium as needed.  Weight has remained stable.  No c/o chest pain.  She is sob with exertion and endorses edema to L LE.  She does elevate LE.  BP has been stable 132/83.  She has dizziness at times.  She feels her anxiety is under control currently, uses ativan as needed.  She is taking all medications as prescribed.  Follow up appointments scheduled.  Agreeable to continued outreach.

## 2024-02-19 ENCOUNTER — TELEPHONE (OUTPATIENT)
Dept: VASCULAR SURGERY | Facility: CLINIC | Age: 64
End: 2024-02-19

## 2024-02-19 DIAGNOSIS — K21.9 GASTROESOPHAGEAL REFLUX DISEASE WITHOUT ESOPHAGITIS: ICD-10-CM

## 2024-02-19 NOTE — TELEPHONE ENCOUNTER
Pt was last seen by Dr. García on 10/6/2023 for stenosis of the left carotid artery. Please see note for details. Pt called stating she made an appt with Dr. García on 2/28/2024 to discuss intervention and has a VAS carotid scheduled for 4/8/2024. Can the carotid duplex be moved up to before 2/28/2024? Please review and advise.

## 2024-02-20 DIAGNOSIS — E87.6 HYPOKALEMIA: ICD-10-CM

## 2024-02-20 DIAGNOSIS — M54.16 LUMBAR RADICULOPATHY: ICD-10-CM

## 2024-02-20 RX ORDER — SUCRALFATE 1 G/1
TABLET ORAL
Qty: 60 TABLET | Refills: 5 | Status: SHIPPED | OUTPATIENT
Start: 2024-02-20

## 2024-02-20 RX ORDER — POTASSIUM CHLORIDE 750 MG/1
10 TABLET, FILM COATED, EXTENDED RELEASE ORAL 2 TIMES DAILY
Qty: 180 TABLET | Refills: 1 | Status: SHIPPED | OUTPATIENT
Start: 2024-02-20

## 2024-02-20 RX ORDER — METHOCARBAMOL 500 MG/1
500 TABLET, FILM COATED ORAL 3 TIMES DAILY
Qty: 90 TABLET | Refills: 0 | Status: SHIPPED | OUTPATIENT
Start: 2024-02-20

## 2024-02-21 ENCOUNTER — TELEPHONE (OUTPATIENT)
Age: 64
End: 2024-02-21

## 2024-02-21 PROBLEM — H10.13 ALLERGIC CONJUNCTIVITIS OF BOTH EYES: Status: RESOLVED | Noted: 2020-01-22 | Resolved: 2024-02-21

## 2024-02-21 NOTE — TELEPHONE ENCOUNTER
Called Weight Management and could not get an appointment until April; pt was wondering if Dr. Recinos could get her an order for sooner appt; Pt is going for ultrasound.

## 2024-02-23 ENCOUNTER — HOSPITAL ENCOUNTER (OUTPATIENT)
Dept: NON INVASIVE DIAGNOSTICS | Facility: HOSPITAL | Age: 64
Discharge: HOME | End: 2024-02-23
Attending: SURGERY
Payer: COMMERCIAL

## 2024-02-23 DIAGNOSIS — I65.22 STENOSIS OF LEFT CAROTID ARTERY: ICD-10-CM

## 2024-02-23 PROCEDURE — 93880 EXTRACRANIAL BILAT STUDY: CPT

## 2024-02-23 PROCEDURE — 93880 EXTRACRANIAL BILAT STUDY: CPT | Performed by: SURGERY

## 2024-02-26 ENCOUNTER — TELEPHONE (OUTPATIENT)
Age: 64
End: 2024-02-26

## 2024-02-26 ENCOUNTER — OFFICE VISIT (OUTPATIENT)
Dept: UROLOGY | Facility: CLINIC | Age: 64
End: 2024-02-26
Payer: COMMERCIAL

## 2024-02-26 VITALS
TEMPERATURE: 96.3 F | DIASTOLIC BLOOD PRESSURE: 70 MMHG | HEIGHT: 64 IN | WEIGHT: 187.4 LBS | OXYGEN SATURATION: 95 % | SYSTOLIC BLOOD PRESSURE: 138 MMHG | BODY MASS INDEX: 31.99 KG/M2 | HEART RATE: 61 BPM

## 2024-02-26 DIAGNOSIS — M62.89 PELVIC FLOOR DYSFUNCTION: Primary | ICD-10-CM

## 2024-02-26 DIAGNOSIS — R39.82 CHRONIC BLADDER PAIN: ICD-10-CM

## 2024-02-26 DIAGNOSIS — N30.10 INTERSTITIAL CYSTITIS: ICD-10-CM

## 2024-02-26 PROCEDURE — 99214 OFFICE O/P EST MOD 30 MIN: CPT | Performed by: UROLOGY

## 2024-02-26 RX ORDER — METHENAMINE, SODIUM PHOSPHATE, MONOBASIC, MONOHYDRATE, PHENYL SALICYLATE, METHYLENE BLUE, AND HYOSCYAMINE SULFATE 120; 40.8; 36; 10; .12 MG/1; MG/1; MG/1; MG/1; MG/1
1 CAPSULE ORAL EVERY 6 HOURS PRN
Qty: 30 CAPSULE | Refills: 11 | Status: SHIPPED | OUTPATIENT
Start: 2024-02-26

## 2024-02-26 NOTE — PROGRESS NOTES
UROLOGY PROGRESS NOTE   [facility]  [address] [place] [address] [place]  [address]  [place] [address]  [phone]  Fax:[phone]  [email or web address]      NAME: Nancy Jones  AGE: 64 y.o. SEX: female  : 1960   MRN: [id number]    DATE: 2024  TIME: 10:56 AM    Assessment and Plan:    Chronic interstitial cystitis chronic bladder pain as below.  No recent UTIs which is a good thing.  We will switch her to Uribel as a trial but she may need to continue with Pyridium indefinitely and although I am not crazy about keeping her on that for long-term and seems to be the only thing that works.  Also we will refer her back to pelvic floor therapy to see if that helps her.  Prescription called into the pharmacy see how it works and she will let me know if it does not.  Otherwise follow-up in 1 year or as needed.                   Chief Complaint     Chief Complaint   Patient presents with    Follow-up       History of Present Illness   Follow-up chronic urethral and bladder pain, lower abdominal pain and history of interstitial cystitis with a history of Hunner's ulcers and recurrent UTI.  I last saw her 2023.  Last time she called with UTI symptoms was 2023.  Out of curiosity I had ordered a testosterone and estradiol level 2023 but this was not done.Since I last saw her, she had ischemic colitis and underwent resection of transverse and descending colon . No colostomy. Has 5 incisional hernias.She has a stent in her SMA.    With regards to urination, it waxes and wanes.  She has good days and bad days.  She thinks that if she drinks more water she does better.  Last UTI was before 2023.  She continues to take 1 to 2 pills of Pyridium per day, will 200 mg each.  Current voiding symptoms are pressure and pain and having to count backwards to get her pelvic floor muscles to relax to release the urine.  She may have done pelvic physical therapy in the past and she is  "interested in trying that again.  She has been on Uribel in the past but she did not feel that it was strong enough and helping her symptoms.  We discussed Pyridium versus Uribel and I would personally prefer that she be on Uribel for long-term pain relief.  She is willing to try the Uribel again.  CT scan abdomen pelvis December 2023 showed normal kidneys and bladder.      The following portions of the patient's history were reviewed and updated as appropriate: allergies, current medications, past family history, past medical history, past social history, past surgical history and problem list.  Past Medical History:   Diagnosis Date    A-fib ([facility]) 03/2020    Allergic     Anxiety     Arthritis     Asthma     Back pain     and muscle pain    Bruises easily     Chronic pain disorder     Interstitial pain    Colon polyp     Dental bridge present     Depression     Eczema     GERD (gastroesophageal reflux disease)     Heart murmur     History of blood clots     per pt \"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\"    History of pneumonia     Hives     Hyperlipidemia     Hypertension     Infertility, female     Interstitial cystitis     Migraine     sees neurologist    Motion sickness     Obesity     Palpitations     PONV (postoperative nausea and vomiting)     Premature atrial contractions 11/09/2022    Psychiatric disorder     TIA (transient ischemic attack) 09/2022    per pt --\"had MRI and 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\"    Urinary incontinence     wears Pads    Venous insufficiency 08/01/2017    Wears glasses      Past Surgical History:   Procedure Laterality Date    COLONOSCOPY      DILATION AND CURETTAGE OF UTERUS      EGD      EXPLORATORY LAPAROTOMY      for infertility    EXPLORATORY LAPAROTOMY W/ BOWEL RESECTION N/A 7/19/2023    Procedure: LAPAROTOMY EXPLORATORY W/ BOWEL RESECTION;  Surgeon: Crista Recinos MD;  Location: [facility];  Service: General    HAND SURGERY      Hand excision of " tendon cyst    HI LAPAROSCOPIC APPENDECTOMY N/A 05/03/2022    Procedure: APPENDECTOMY LAPAROSCOPIC;  Surgeon: Vin Fields MD;  Location: [facility];  Service: General    HI LAPS ABD PRTM&OMENTUM DX W/WO SPEC BR/WA SPX N/A 7/19/2023    Procedure: LAPAROSCOPY DIAGNOSTIC, LYSIS OF ADHESIONS, LAPAROSCOPY TAKE TAKEDOWN OF LEFT COLON, EXPLORATORY LAPAROSCOPY, LYSIS OF ADHESIONS, RESECTION OF TRANSVERSE COLON SPLENIC FLEXURE AND DESCENDING COLON , TAKE DOWN OF SPLENIC FLEXURE, SIGMOIDOSCOPY, MOBILIZATION OF RIGHT COLON, PRIMARY ANASTOMOSIS EEA 29, TAP BLOCK;  Surgeon: Crista Recinos MD;  Location: [facility];  Service: General    HI TEAEC W/PATCH GRF CAROTID VERTB SUBCLAV NECK INC Left 1/31/2023    Procedure: EXPLORATION OF LEFT CAROTID ARTERY; ABORTED ENDARTERECTOMY ARTERY CAROTID;  Surgeon: Roberto García DO;  Location: [facility];  Service: Vascular    SUPERIOR MESTENTERIC ARTERY STENT      WISDOM TOOTH EXTRACTION       shoulder  Review of Systems   Review of Systems    Active Problem List     Patient Active Problem List   Diagnosis    Angina pectoris (HCC)    Essential hypertension    Migraines    AMD (age-related macular degeneration), bilateral    Allergic reaction    Gastroesophageal reflux disease without esophagitis    Angio-edema    Paroxysmal atrial fibrillation (HCC)    Lumbar radiculopathy    CAMILLE (obstructive sleep apnea)    Hypertensive heart disease with congestive heart failure (HCC)    Unspecified diastolic (congestive) heart failure (HCC)    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HCC)    COPD (chronic obstructive pulmonary disease) (HCC)    Urinary retention    Peripheral vascular disease (HCC)    Appendix disease    Carotid artery stenosis    Stenosis of left carotid artery    Asymptomatic stenosis of left carotid artery    Hepatic steatosis    Partial facial nerve palsy    Injury of left facial nerve    Paresthesia    Colitis    Chronic migraine w/o aura w/o status migrainosus, not intractable    IIH  "(idiopathic intracranial hypertension)    Hematochezia    Left carotid stenosis    Colonic ischemia (HCC)    Acute postoperative pain    S/P small bowel resection    Anxiety    Omental infarction (HCC)    Hypokalemia    Night sweats    Dysuria    Incisional hernia, without obstruction or gangrene    Stage 3 chronic kidney disease, unspecified whether stage 3a or 3b CKD (HCC)    Ventral hernia without obstruction or gangrene    Atherosclerosis of native arteries of extremities with rest pain, bilateral legs (HCC)    Acute on chronic diastolic (congestive) heart failure (HCC)    Left facial numbness    Other chest pain       Objective   /70   Pulse 61   Temp (!) 96.3 °F (35.7 °C)   Ht 5' 4\" (1.626 m)   Wt 85 kg (187 lb 6.4 oz)   LMP  (LMP Unknown)   SpO2 95%   BMI 32.17 kg/m²     Physical Exam  Vitals reviewed.   Constitutional:       Appearance: Normal appearance.   HENT:      Head: Normocephalic and atraumatic.   Eyes:      Extraocular Movements: Extraocular movements intact.   Pulmonary:      Effort: Pulmonary effort is normal.   Musculoskeletal:         General: Normal range of motion.      Cervical back: Normal range of motion.   Skin:     Coloration: Skin is not jaundiced or pale.   Neurological:      General: No focal deficit present.      Mental Status: She is alert and oriented to person, place, and time. Mental status is at baseline.   Psychiatric:         Mood and Affect: Mood normal.         Behavior: Behavior normal.         Thought Content: Thought content normal.         Judgment: Judgment normal.             Current Medications     Current Outpatient Medications:     acetaminophen (TYLENOL) 325 mg tablet, Take 2 tablets (650 mg total) by mouth every 6 (six) hours, Disp: , Rfl: 0    amiodarone 200 mg tablet, Take 1 tablet (200 mg total) by mouth daily, Disp: 30 tablet, Rfl: 0    Ascorbic Acid (vitamin C) 1000 MG tablet, Take 1 tablet (1,000 mg total) by mouth daily, Disp: 30 tablet, Rfl: " 0    aspirin 81 mg chewable tablet, Chew 1 tablet (81 mg total) daily, Disp: , Rfl: 0    cetirizine (ZyrTEC) 10 mg tablet, Take 10 mg by mouth daily, Disp: , Rfl:     Chelated Zinc 50 MG TABS, Take 50 mg by mouth daily, Disp: , Rfl:     chlordiazepoxide-clidinium (LIBRAX) 5-2.5 mg per capsule, TAKE ONE CAPSULE BY MOUTH FOUR TIMES DAILY AND ONE CAPSULE BEFORE BEDTIME. (Patient taking differently: 1 capsule daily as needed for cramping), Disp: 120 capsule, Rfl: 0    Cholecalciferol 25 MCG (1000 UT) tablet, Take 1,000 Units by mouth daily, Disp: , Rfl:     dexamethasone (DECADRON) 4 mg tablet, 1 tab PO with breakfast for 1 to 5 days for unrelenting migraine, Disp: 5 tablet, Rfl: 0    diltiazem (CARDIZEM CD) 120 mg 24 hr capsule, TAKE ONE CAPSULE BY MOUTH ONCE DAILY, Disp: 30 capsule, Rfl: 3    Docusate Sodium (COLACE PO), Take by mouth daily at bedtime, Disp: , Rfl:     Eliquis 5 MG, TAKE ONE TABLET BY MOUTH TWICE DAILY, Disp: 180 tablet, Rfl: 3    famotidine (PEPCID) 20 mg tablet, Take 20 mg by mouth daily at bedtime, Disp: , Rfl:     fexofenadine (ALLEGRA) 180 MG tablet, Take 180 mg by mouth daily, Disp: , Rfl:     fluticasone-vilanterol (BREO ELLIPTA) 200-25 MCG/INH inhaler, Inhale 1 puff daily Rinse mouth after use., Disp: 3 Inhaler, Rfl: 3    furosemide (LASIX) 40 mg tablet, Take 1 tablet (40 mg total) by mouth daily, Disp: 90 tablet, Rfl: 0    hydrOXYzine HCL (ATARAX) 25 mg tablet, TAKE TWO TABLETS BY MOUTH DAILY AT BEDTIME (Patient taking differently: daily as needed), Disp: 90 tablet, Rfl: 0    LORazepam (ATIVAN) 1 mg tablet, Take 1 tablet (1 mg total) by mouth 3 (three) times a day, Disp: 90 tablet, Rfl: 0    magnesium Oxide (MAG-OX) 400 mg TABS, Take 1 tablet (400 mg total) by mouth daily at bedtime, Disp: 90 tablet, Rfl: 3    methocarbamol (ROBAXIN) 500 mg tablet, TAKE ONE TABLET BY MOUTH THREE TIMES DAILY, Disp: 90 tablet, Rfl: 0    metoprolol succinate (TOPROL-XL) 100 mg 24 hr tablet, TAKE ONE TABLET BY  MOUTH TWICE DAILY, Disp: 180 tablet, Rfl: 1    montelukast (SINGULAIR) 10 mg tablet, Take 1 tablet (10 mg total) by mouth daily at bedtime, Disp: 90 tablet, Rfl: 1    MULTIPLE VITAMIN PO, Take by mouth, Disp: , Rfl:     naloxone (NARCAN) 4 mg/0.1 mL nasal spray, Administer 1 spray into a nostril. If no response after 2-3 minutes, give another dose in the other nostril using a new spray., Disp: 1 each, Rfl: 1    omeprazole (PriLOSEC) 40 MG capsule, Take 1 capsule (40 mg total) by mouth 2 (two) times a day 30 minutes before breakfast and dinner, Disp: 60 capsule, Rfl: 3    phenazopyridine (PYRIDIUM) 200 mg tablet, Take 1 tablet (200 mg total) by mouth if needed for bladder spasms, Disp: 30 tablet, Rfl: 0    potassium chloride (Klor-Con) 10 mEq tablet, Take 1 tablet (10 mEq total) by mouth 2 (two) times a day, Disp: 180 tablet, Rfl: 1    promethazine (PHENERGAN) 25 mg tablet, Take 1 tablet (25 mg total) by mouth every 6 (six) hours as needed for nausea or vomiting, Disp: 60 tablet, Rfl: 2    Repatha SureClick 140 MG/ML SOAJ, Inject 140mg under the skin every 2 weeks., Disp: 2 mL, Rfl: 11    spironolactone (ALDACTONE) 25 mg tablet, TAKE ONE TABLET BY MOUTH TWICE DAILY, Disp: 180 tablet, Rfl: 1    sucralfate (CARAFATE) 1 g tablet, TAKE ONE TABLET BY MOUTH TWICE DAILY BEFORE MEALS, Disp: 60 tablet, Rfl: 5    traMADol (ULTRAM) 50 mg tablet, Take 2 tablets (100 mg total) by mouth every 8 (eight) hours as needed for moderate pain, Disp: 180 tablet, Rfl: 0    triamcinolone (KENALOG) 0.1 % cream, Apply topically 2 (two) times a day, Disp: 15 g, Rfl: 2    Atrovent HFA 17 MCG/ACT inhaler, Inhale 2 puffs 4 (four) times a day as needed (Patient not taking: Reported on 2/26/2024), Disp: , Rfl:     Current Facility-Administered Medications:     cyanocobalamin injection 1,000 mcg, 1,000 mcg, Intramuscular, Q30 Days, Coy Ayoub DO, 1,000 mcg at 11/16/23 1151        Alexis Hobson MD

## 2024-02-26 NOTE — TELEPHONE ENCOUNTER
"Dr Recinos asked to relay to pt she has no \"pull\" to get an earlier appt, but in speaking with the pt this morning, she said she was on the waiting list, but would call them to see if they had any cancellations coming up to get her in sooner.  "

## 2024-02-27 ENCOUNTER — HOSPITAL ENCOUNTER (OUTPATIENT)
Dept: RADIOLOGY | Facility: HOSPITAL | Age: 64
Discharge: HOME | End: 2024-02-27
Payer: COMMERCIAL

## 2024-02-27 ENCOUNTER — OFFICE VISIT (OUTPATIENT)
Dept: FAMILY MEDICINE CLINIC | Facility: CLINIC | Age: 64
End: 2024-02-27
Payer: COMMERCIAL

## 2024-02-27 VITALS
OXYGEN SATURATION: 98 % | BODY MASS INDEX: 31.76 KG/M2 | SYSTOLIC BLOOD PRESSURE: 110 MMHG | HEART RATE: 60 BPM | TEMPERATURE: 97.3 F | RESPIRATION RATE: 21 BRPM | DIASTOLIC BLOOD PRESSURE: 80 MMHG | WEIGHT: 186 LBS | HEIGHT: 64 IN

## 2024-02-27 DIAGNOSIS — Z90.49 S/P SMALL BOWEL RESECTION: ICD-10-CM

## 2024-02-27 DIAGNOSIS — I65.22 STENOSIS OF LEFT CAROTID ARTERY: ICD-10-CM

## 2024-02-27 DIAGNOSIS — F41.9 ANXIETY: ICD-10-CM

## 2024-02-27 DIAGNOSIS — M25.562 ACUTE PAIN OF LEFT KNEE: ICD-10-CM

## 2024-02-27 DIAGNOSIS — Z12.4 CERVICAL CANCER SCREENING: ICD-10-CM

## 2024-02-27 DIAGNOSIS — I48.0 PAROXYSMAL ATRIAL FIBRILLATION (HCC): Primary | ICD-10-CM

## 2024-02-27 DIAGNOSIS — M54.16 LUMBAR RADICULOPATHY: ICD-10-CM

## 2024-02-27 PROCEDURE — 73564 X-RAY EXAM KNEE 4 OR MORE: CPT

## 2024-02-27 PROCEDURE — 99214 OFFICE O/P EST MOD 30 MIN: CPT | Performed by: FAMILY MEDICINE

## 2024-02-27 RX ORDER — LORAZEPAM 1 MG/1
1 TABLET ORAL 3 TIMES DAILY
Qty: 90 TABLET | Refills: 0 | Status: CANCELLED | OUTPATIENT
Start: 2024-02-27

## 2024-02-27 RX ORDER — TRAMADOL HYDROCHLORIDE 50 MG/1
100 TABLET ORAL EVERY 8 HOURS PRN
Qty: 180 TABLET | Refills: 0 | Status: CANCELLED | OUTPATIENT
Start: 2024-02-27

## 2024-02-27 RX ORDER — AMIODARONE HYDROCHLORIDE 200 MG/1
200 TABLET ORAL DAILY
Qty: 30 TABLET | Refills: 0 | Status: SHIPPED | OUTPATIENT
Start: 2024-02-27

## 2024-02-27 NOTE — PROGRESS NOTES
Assessment/Plan: Patient use ice and tramadol.  Patient will continue with amiodarone for A-fib which is stable.  Patient in normal sinus rhythm.  Patient will continue with other meds for chronic conditions listed below.  Patient will follow-up with specialist appropriately.       Diagnoses and all orders for this visit:    Paroxysmal atrial fibrillation (HCC)  -     amiodarone 200 mg tablet; Take 1 tablet (200 mg total) by mouth daily    Anxiety    S/P small bowel resection    Lumbar radiculopathy    Cervical cancer screening  -     Ambulatory Referral to Obstetrics / Gynecology; Future    Stenosis of left carotid artery    Acute pain of left knee  -     XR knee 4+ vw left injury; Future            Subjective:        Patient ID: Nancy Jones is a 64 y.o. female.      Patient is here with left knee pain after bumping into furniture into the lateral left knee roughly 1 and half to 2 weeks ago.  Patient having difficulty walking.  Patient with some instability.  Patient did fall.  Patient having difficulty putting pressure on her knee.  Patient will need refills for other chronic conditions.  Rest of patient's conditions relatively stable at this time.    Knee Pain           The following portions of the patient's history were reviewed and updated as appropriate: allergies, current medications, past family history, past medical history, past social history, past surgical history and problem list.      Review of Systems   Constitutional: Negative.    HENT: Negative.     Eyes: Negative.    Respiratory: Negative.     Cardiovascular: Negative.    Gastrointestinal:  Positive for abdominal pain.   Endocrine: Negative.    Genitourinary: Negative.    Musculoskeletal:  Positive for arthralgias and gait problem.   Skin: Negative.    Allergic/Immunologic: Negative.    Hematological: Negative.    Psychiatric/Behavioral: Negative.             Objective:        Depression Screening and Follow-up Plan: Patient assessed for  "underlying major depression. Brief counseling provided and recommend additional follow-up/re-evaluation next office visit.             /80 (BP Location: Right arm, Patient Position: Sitting, Cuff Size: Standard)   Pulse 60   Temp (!) 97.3 °F (36.3 °C) (Temporal)   Resp 21   Ht 5' 4\" (1.626 m)   Wt 84.4 kg (186 lb) Comment: per patient  LMP  (LMP Unknown)   SpO2 98%   BMI 31.93 kg/m²          Physical Exam  Vitals and nursing note reviewed.   Constitutional:       General: She is not in acute distress.     Appearance: Normal appearance. She is not ill-appearing, toxic-appearing or diaphoretic.   HENT:      Head: Normocephalic and atraumatic.      Right Ear: Tympanic membrane, ear canal and external ear normal. There is no impacted cerumen.      Left Ear: Tympanic membrane, ear canal and external ear normal. There is no impacted cerumen.      Nose: Nose normal. No congestion or rhinorrhea.      Mouth/Throat:      Mouth: Mucous membranes are moist.      Pharynx: No oropharyngeal exudate or posterior oropharyngeal erythema.   Eyes:      General: No scleral icterus.        Right eye: No discharge.         Left eye: No discharge.   Neck:      Vascular: No carotid bruit.   Cardiovascular:      Rate and Rhythm: Normal rate and regular rhythm.      Pulses: Normal pulses.      Heart sounds: Normal heart sounds. No murmur heard.     No friction rub. No gallop.   Pulmonary:      Effort: Pulmonary effort is normal. No respiratory distress.      Breath sounds: Normal breath sounds. No stridor. No wheezing, rhonchi or rales.   Chest:      Chest wall: No tenderness.   Musculoskeletal:         General: Tenderness present. No swelling, deformity or signs of injury. Normal range of motion.      Cervical back: Normal range of motion and neck supple. No rigidity. No muscular tenderness.      Right lower leg: No edema.      Left lower leg: No edema.      Comments: Pain with palpation over the left lateral joint line.  " Patient with pain over the tibial plateau/tuberosity.  Positive Apley test.  Negative Lachman test.  No pain testing MCL.  Patient does have some discomfort with LCL.   Lymphadenopathy:      Cervical: No cervical adenopathy.   Skin:     General: Skin is warm and dry.      Capillary Refill: Capillary refill takes less than 2 seconds.      Coloration: Skin is not jaundiced.      Findings: No bruising, erythema, lesion or rash.   Neurological:      Mental Status: She is alert and oriented to person, place, and time.      Gait: Gait abnormal.   Psychiatric:         Mood and Affect: Mood normal.         Behavior: Behavior normal.         Thought Content: Thought content normal.         Judgment: Judgment normal.

## 2024-02-28 ENCOUNTER — TELEPHONE (OUTPATIENT)
Age: 64
End: 2024-02-28

## 2024-02-28 ENCOUNTER — OFFICE VISIT (OUTPATIENT)
Dept: VASCULAR SURGERY | Facility: CLINIC | Age: 64
End: 2024-02-28
Payer: COMMERCIAL

## 2024-02-28 ENCOUNTER — TELEPHONE (OUTPATIENT)
Dept: VASCULAR SURGERY | Facility: CLINIC | Age: 64
End: 2024-02-28

## 2024-02-28 VITALS
HEART RATE: 60 BPM | SYSTOLIC BLOOD PRESSURE: 104 MMHG | DIASTOLIC BLOOD PRESSURE: 72 MMHG | HEIGHT: 64 IN | OXYGEN SATURATION: 94 % | BODY MASS INDEX: 31.76 KG/M2 | WEIGHT: 186 LBS

## 2024-02-28 DIAGNOSIS — I65.22 STENOSIS OF LEFT CAROTID ARTERY: Primary | ICD-10-CM

## 2024-02-28 PROCEDURE — 99214 OFFICE O/P EST MOD 30 MIN: CPT | Performed by: SURGERY

## 2024-02-28 NOTE — TELEPHONE ENCOUNTER
"Pt called the office back today after seeing Dr. Gillette for clearance for hernia repair. She wanted Dr. Gillette to know that she had a 12 hr bowel surgery in July and she was not sure if he was aware of that and that Dr. Recinos \"doesn't want to touch her\" until she loses at least 20 pounds. She thinks that she should move forward w/ carotid surgery prior to hernia repair. Informed her I would send a message to Dr. Gillette.  "

## 2024-02-28 NOTE — ASSESSMENT & PLAN NOTE
Patient with known asymptomatic left carotid stenosis.  She previously underwent attempt at L CEA however procedure aborted due to inability to attain adequate distal ICA exposure to perform procedure safely.  She presents to office to discuss most recent carotid duplex.  Duplex demonstrates expected left carotid high grade stenosis.  She has incisional hernia causing her discomfort and episodic nausea.  Ok to proceed with hernia repair from a vascular standpoint.Ok to hold antiplatelet therapy if needed for hernia surgery.    Will plan to obtain repeat CTA neck in 3months with return office visit at that time to rediscuss possible carotid intervention.  We discussed the complexities of potential carotid intervention secondary to overall anatomy (carotid endarterectomy aborted secondary to inability to achieve adequate distal exposure; anticipate transcarotid artery revascularization to be quite challenging secondary to the nature of the common carotid artery.  And finally a transfemoral carotid intervention would be challenging secondary to patient's type II aortic arch.)    Given patient's asymptomatic nature of her carotid stenosis in the setting of reported symptomatic hernia favor proceeding with hernia repair at this time. Her contralateral carotid is without significant disease and Vertebral are antegrade.

## 2024-02-28 NOTE — PROGRESS NOTES
Assessment/Plan:    Stenosis of left carotid artery  Patient with known asymptomatic left carotid stenosis.  She previously underwent attempt at L CEA however procedure aborted due to inability to attain adequate distal ICA exposure to perform procedure safely.  She presents to office to discuss most recent carotid duplex.  Duplex demonstrates expected left carotid high grade stenosis.  She has incisional hernia causing her discomfort and episodic nausea.  Ok to proceed with hernia repair from a vascular standpoint.Ok to hold antiplatelet therapy if needed for hernia surgery.    Will plan to obtain repeat CTA neck in 3months with return office visit at that time to rediscuss possible carotid intervention.  We discussed the complexities of potential carotid intervention secondary to overall anatomy (carotid endarterectomy aborted secondary to inability to achieve adequate distal exposure; anticipate transcarotid artery revascularization to be quite challenging secondary to the nature of the common carotid artery.  And finally a transfemoral carotid intervention would be challenging secondary to patient's type II aortic arch.)    Given patient's asymptomatic nature of her carotid stenosis in the setting of reported symptomatic hernia favor proceeding with hernia repair at this time. Her contralateral carotid is without significant disease and Vertebral are antegrade.       Diagnoses and all orders for this visit:    Stenosis of left carotid artery  -     CTA head and neck w wo contrast; Future          Subjective:      Patient ID: Nancy Jones is a 64 y.o. female.    Patient is here today to review results of a CV done 2/23/2024. Patient had an aborted Left CEA done 1/31/2023. Patient denies any headaches, dizziness, sudden loss of vision, trouble swallowing, slurred speech or any other TIA or Stroke symptoms. Patient is taking ASA 81 mg and  Eliquis. She is a former smoker.     Adina returns to office to discuss  Magdi "surveillance carotid duplex and \"clearance\" for incisional hernia.  Denies any strokelike symptoms.        The following portions of the patient's history were reviewed and updated as appropriate: allergies, current medications, past family history, past medical history, past social history, past surgical history, and problem list.    Review of Systems   Constitutional: Negative.    HENT: Negative.     Eyes: Negative.    Respiratory: Negative.     Cardiovascular: Negative.    Gastrointestinal:  Positive for abdominal pain.   Endocrine: Negative.    Genitourinary: Negative.    Musculoskeletal: Negative.    Skin: Negative.    Allergic/Immunologic: Negative.    Neurological: Negative.    Hematological: Negative.    Psychiatric/Behavioral: Negative.           Objective:      /72 (BP Location: Left arm, Patient Position: Sitting, Cuff Size: Standard)   Pulse 60   Ht 5' 4\" (1.626 m)   Wt 84.4 kg (186 lb)   LMP  (LMP Unknown)   SpO2 94%   BMI 31.93 kg/m²          Physical Exam  Constitutional:       General: She is not in acute distress.     Appearance: She is well-developed. She is obese. She is not ill-appearing, toxic-appearing or diaphoretic.   HENT:      Head: Normocephalic and atraumatic.   Eyes:      General: No scleral icterus.     Conjunctiva/sclera: Conjunctivae normal.   Neck:      Trachea: No tracheal deviation.   Cardiovascular:      Rate and Rhythm: Normal rate and regular rhythm.      Heart sounds: Normal heart sounds.   Pulmonary:      Effort: Pulmonary effort is normal.      Breath sounds: Normal breath sounds.   Abdominal:      General: There is no distension.      Palpations: Abdomen is soft. There is mass (incisional hernia with no overlying skin changes).      Tenderness: There is no guarding or rebound.   Musculoskeletal:         General: Normal range of motion.      Cervical back: Normal range of motion and neck supple.   Skin:     General: Skin is warm and dry.   Neurological:      " Mental Status: She is alert and oriented to person, place, and time.   Psychiatric:         Mood and Affect: Mood normal.         Behavior: Behavior normal.         Thought Content: Thought content normal.         Judgment: Judgment normal.      Comments: Baseline anxiety             Carotid duplex:  CONCLUSION:     Impression  RIGHT:  There is <50% stenosis noted in the internal carotid artery. Plaque is  heterogenous.  Vertebral artery flow is antegrade. There is no significant subclavian artery  disease.     LEFT:  There is >70% stenosis noted in the internal carotid artery. Plaque is  heterogenous.  Vertebral artery flow is antegrade. There is no significant subclavian artery  disease.     Compared to previous study on 9/12/2023, there is no significant change.  Recommend repeat testing in 6 months as per protocol unless otherwise  indicated.

## 2024-02-28 NOTE — TELEPHONE ENCOUNTER
Patient called to cancel appt on 3/5 melia/ shea.  No avail appts to reschedule.  Patient is going to call back in a couple weeks when AP schedule is open. Placed on wait list

## 2024-02-29 DIAGNOSIS — M54.16 LUMBAR RADICULOPATHY: ICD-10-CM

## 2024-02-29 DIAGNOSIS — F41.9 ANXIETY: ICD-10-CM

## 2024-02-29 DIAGNOSIS — R39.82 CHRONIC BLADDER PAIN: ICD-10-CM

## 2024-02-29 DIAGNOSIS — N30.10 INTERSTITIAL CYSTITIS: ICD-10-CM

## 2024-02-29 DIAGNOSIS — Z90.49 S/P SMALL BOWEL RESECTION: ICD-10-CM

## 2024-02-29 RX ORDER — TRAMADOL HYDROCHLORIDE 50 MG/1
100 TABLET ORAL EVERY 8 HOURS PRN
Qty: 180 TABLET | Refills: 0 | Status: SHIPPED | OUTPATIENT
Start: 2024-02-29

## 2024-02-29 RX ORDER — METHENAMINE, SODIUM PHOSPHATE, MONOBASIC, MONOHYDRATE, PHENYL SALICYLATE, METHYLENE BLUE, AND HYOSCYAMINE SULFATE 120; 40.8; 36; 10; .12 MG/1; MG/1; MG/1; MG/1; MG/1
1 CAPSULE ORAL EVERY 6 HOURS PRN
Qty: 30 CAPSULE | Refills: 11 | Status: CANCELLED | OUTPATIENT
Start: 2024-02-29

## 2024-02-29 RX ORDER — LORAZEPAM 1 MG/1
1 TABLET ORAL 3 TIMES DAILY
Qty: 90 TABLET | Refills: 0 | Status: SHIPPED | OUTPATIENT
Start: 2024-02-29

## 2024-02-29 NOTE — TELEPHONE ENCOUNTER
Patient called pharmacy about this script and they stated they did not have this. Can we call this into [facility] for her please?    Reason for call:   [x] Refill   [] Prior Auth  [] Other:     Office:   [x] PCP/Provider - Alexis Hobson   [] Specialty/Provider -     Medication: Meth-Hyo-M Bl-Na Phos-Ph Sal (Ustell)     Dose/Frequency: 120 MG CAPS Take 1 capsule (120 mg total) by mouth every 6 (six) hours as needed (bladder pain)     Quantity: 30 caps + 11 refills    Pharmacy: [id number] [place] [facility]      Does the patient have enough for 3 days?   [] Yes   [x] No - Send as HP to POD

## 2024-02-29 NOTE — TELEPHONE ENCOUNTER
Called pt and informed her of needing a new pharmacy to send medication too or she can come into the office and pick them up her self   
Pt called back and stated she will come to the office shortly to  the scripts.   
Yes

## 2024-03-01 ENCOUNTER — OFFICE VISIT (OUTPATIENT)
Dept: GASTROENTEROLOGY | Facility: CLINIC | Age: 64
End: 2024-03-01
Payer: COMMERCIAL

## 2024-03-01 VITALS
SYSTOLIC BLOOD PRESSURE: 126 MMHG | HEIGHT: 64 IN | BODY MASS INDEX: 32.61 KG/M2 | TEMPERATURE: 98.2 F | DIASTOLIC BLOOD PRESSURE: 70 MMHG | WEIGHT: 191 LBS

## 2024-03-01 DIAGNOSIS — K43.9 ABDOMINAL WALL HERNIA: ICD-10-CM

## 2024-03-01 DIAGNOSIS — R10.9 ABDOMINAL PAIN, UNSPECIFIED ABDOMINAL LOCATION: ICD-10-CM

## 2024-03-01 DIAGNOSIS — R68.81 EARLY SATIETY: ICD-10-CM

## 2024-03-01 DIAGNOSIS — R14.0 BLOATING: ICD-10-CM

## 2024-03-01 DIAGNOSIS — K21.9 GASTROESOPHAGEAL REFLUX DISEASE WITHOUT ESOPHAGITIS: Primary | ICD-10-CM

## 2024-03-01 DIAGNOSIS — K58.1 IRRITABLE BOWEL SYNDROME WITH CONSTIPATION: ICD-10-CM

## 2024-03-01 PROCEDURE — 99214 OFFICE O/P EST MOD 30 MIN: CPT | Performed by: INTERNAL MEDICINE

## 2024-03-01 NOTE — PROGRESS NOTES
[facility] - Outpatient Follow-up Note  Nancy Jones 64 y.o. female MRN: [id number]  Encounter: [id number]          ASSESSMENT AND PLAN:      1.  Abdominal pain  2.  Refractory GERD  3.  Bloating  4.  Early satiety  5.  Irritable bowel syndrome constipation type  6.  Abdominal wall hernia    Patient was acid reflux/heartburn have improved but not completely controlled.  She has a history of peptic ulcer disease.  Her other symptoms continue to be uncontrolled.  The differentials include delayed gastric emptying, peptic ulcer disease, refractory acid reflux to medical therapy.  Her last EGD was more than 2 years ago.  Discussed with patient about repeating EGD as well as getting gastric emptying study.  I ordered the investigations.  Patient will run these by insurance to see about insurance coverage and get back to us about scheduling the EGD.  She will have to call central scheduling to schedule the gastric emptying study.  Continue omeprazole 40 mg twice daily, Carafate.  Continue MiraLAX once daily.  Continue to follow-up with general surgery about the abdominal wall hernias.  Patient reports that she may get surgery in the future after she has for 20 pounds weight loss.    RTC 6 months.      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    Patient reports that heartburn has improved with omeprazole 40 mg twice daily but not completely controlled.  She continues to have nausea, bloating, abdominal pain in the left part of the belly and early fullness intermittently.  Her bowel movements are regular since taking MiraLAX.    Patient had CT scan in December 2023 which  "showed stable findings with no evidence of ischemic bowel.      REVIEW OF SYSTEMS IS OTHERWISE NEGATIVE.      Historical Information   Past Medical History:   Diagnosis Date    A-fib (HCC) 03/2020    Allergic     Anxiety     Arthritis     Asthma     Back pain     and muscle pain    Bruises easily     Chronic pain disorder     Interstitial pain    Colon polyp     Dental bridge present     Depression     Eczema     GERD (gastroesophageal reflux disease)     Heart murmur     History of blood clots     per pt \"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\"    History of pneumonia     Hives     Hyperlipidemia     Hypertension     Infertility, female     Interstitial cystitis     Migraine     sees neurologist    Motion sickness     Obesity     Palpitations     PONV (postoperative nausea and vomiting)     Premature atrial contractions 11/09/2022    Psychiatric disorder     TIA (transient ischemic attack) 09/2022    per pt --\"had MRI and 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\"    Urinary incontinence     wears Pads    Venous insufficiency 08/01/2017    Wears glasses      Past Surgical History:   Procedure Laterality Date    COLONOSCOPY      DILATION AND CURETTAGE OF UTERUS      EGD      EXPLORATORY LAPAROTOMY      for infertility    EXPLORATORY LAPAROTOMY W/ BOWEL RESECTION N/A 7/19/2023    Procedure: LAPAROTOMY EXPLORATORY W/ BOWEL RESECTION;  Surgeon: Crista Recinos MD;  Location: [facility];  Service: General    HAND SURGERY      Hand excision of tendon cyst    TN LAPAROSCOPIC APPENDECTOMY N/A 05/03/2022    Procedure: APPENDECTOMY LAPAROSCOPIC;  Surgeon: Vin Fields MD;  Location: [facility];  Service: General    TN LAPS ABD PRTM&OMENTUM DX W/WO SPEC BR/WA SPX N/A 7/19/2023    Procedure: LAPAROSCOPY DIAGNOSTIC, LYSIS OF ADHESIONS, LAPAROSCOPY TAKE TAKEDOWN OF LEFT COLON, EXPLORATORY LAPAROSCOPY, LYSIS OF ADHESIONS, RESECTION OF TRANSVERSE COLON SPLENIC FLEXURE AND DESCENDING COLON , TAKE DOWN OF SPLENIC " FLEXURE, SIGMOIDOSCOPY, MOBILIZATION OF RIGHT COLON, PRIMARY ANASTOMOSIS EEA 29, TAP BLOCK;  Surgeon: Crista Recinos MD;  Location: [facility];  Service: General    MA TEAEC W/PATCH GRF CAROTID VERTB SUBCLAV NECK INC Left 2023    Procedure: EXPLORATION OF LEFT CAROTID ARTERY; ABORTED ENDARTERECTOMY ARTERY CAROTID;  Surgeon: Roberto García DO;  Location: [facility];  Service: Vascular    SUPERIOR MESTENTERIC ARTERY STENT      WISDOM TOOTH EXTRACTION       Social History   Social History     Substance and Sexual Activity   Alcohol Use Not Currently     Social History     Substance and Sexual Activity   Drug Use No     Social History     Tobacco Use   Smoking Status Former    Current packs/day: 0.00    Average packs/day: 0.5 packs/day for 10.0 years (5.0 ttl pk-yrs)    Types: Cigarettes    Start date:     Quit date:     Years since quittin.1    Passive exposure: Never   Smokeless Tobacco Never     Family History   Problem Relation Age of Onset    Lung cancer Mother 60    Brain cancer Mother 60    Diabetes Father     Depression Father     Other Father         septic    Allergies Sister     Hashimoto's thyroiditis Sister     Abdominal aortic aneurysm Sister     Diabetes Sister     No Known Problems Sister     No Known Problems Maternal Grandmother     No Known Problems Maternal Grandfather     No Known Problems Paternal Grandmother     No Known Problems Paternal Grandfather     Hodgkin's lymphoma Brother     No Known Problems Brother     No Known Problems Maternal Aunt     Diabetes Other     Breast cancer Neg Hx        Meds/Allergies       Current Outpatient Medications:     acetaminophen (TYLENOL) 325 mg tablet    amiodarone 200 mg tablet    aspirin 81 mg chewable tablet    Atrovent HFA 17 MCG/ACT inhaler    cetirizine (ZyrTEC) 10 mg tablet    Chelated Zinc 50 MG TABS    chlordiazepoxide-clidinium (LIBRAX) 5-2.5 mg per capsule    Cholecalciferol 25 MCG (1000 UT) tablet    dexamethasone (DECADRON)  "4 mg tablet    diltiazem (CARDIZEM CD) 120 mg 24 hr capsule    Docusate Sodium (COLACE PO)    Eliquis 5 MG    famotidine (PEPCID) 20 mg tablet    fexofenadine (ALLEGRA) 180 MG tablet    fluticasone-vilanterol (BREO ELLIPTA) 200-25 MCG/INH inhaler    hydrOXYzine HCL (ATARAX) 25 mg tablet    LORazepam (ATIVAN) 1 mg tablet    magnesium Oxide (MAG-OX) 400 mg TABS    Meth-Hyo-M Bl-Na Phos-Ph Sal (Ustell) 120 MG CAPS    methocarbamol (ROBAXIN) 500 mg tablet    metoprolol succinate (TOPROL-XL) 100 mg 24 hr tablet    montelukast (SINGULAIR) 10 mg tablet    MULTIPLE VITAMIN PO    naloxone (NARCAN) 4 mg/0.1 mL nasal spray    omeprazole (PriLOSEC) 40 MG capsule    potassium chloride (Klor-Con) 10 mEq tablet    promethazine (PHENERGAN) 25 mg tablet    Repatha SureClick 140 MG/ML SOAJ    spironolactone (ALDACTONE) 25 mg tablet    sucralfate (CARAFATE) 1 g tablet    traMADol (ULTRAM) 50 mg tablet    triamcinolone (KENALOG) 0.1 % cream    Ascorbic Acid (vitamin C) 1000 MG tablet    furosemide (LASIX) 40 mg tablet    phenazopyridine (PYRIDIUM) 200 mg tablet    Current Facility-Administered Medications:     cyanocobalamin injection 1,000 mcg, 1,000 mcg, Intramuscular, Q30 Days, 1,000 mcg at 11/16/23 1151    Allergies   Allergen Reactions    Ace Inhibitors Angioedema and Anaphylaxis     Anaphylaxis    Benicar [Olmesartan] Angioedema     See Allergy note from 9/11/2008.  Swollen ankles/legs    Hydralazine Other (See Comments)     Lip swelling  Flank pain    Other Anaphylaxis and Other (See Comments)     Other reaction(s): angioadema  Other reaction(s): Other (See Comments)  Preservatives- itching throat closes, hi  Other reaction(s): angioadema  E Z Cat - hives throat closes itching,  Preservatives- itching throat closes, hi  Artificial sweeteners    Valsartan Angioedema     Lips, face swollen    Sulfa Antibiotics Hives     stuffiness,itching,hives,throat closing--per pt \"sometimes able to take depending on the brand and the filler or " "possibly preservatives in it\"    Ampicillin Hives     Depends on brand some preservatives can react. Has recently tolerated oral amoxicillin.    Motrin [Ibuprofen] Other (See Comments)     Per pt \" told not to take due to A Fib\"    Wound Dressing Adhesive Other (See Comments)     Per pt Adhesive on EKG leads caused redness           Objective     Blood pressure 126/70, temperature 98.2 °F (36.8 °C), height 5' 4\" (1.626 m), weight 86.6 kg (191 lb), not currently breastfeeding. Body mass index is 32.79 kg/m².      PHYSICAL EXAM:      General Appearance:   Alert, cooperative, no distress   HEENT:   Normocephalic, atraumatic, anicteric.     Neck:  Supple, symmetrical, trachea midline   Lungs:   Clear to auscultation bilaterally; no rales, rhonchi or wheezing; respirations unlabored    Heart::   Regular rate and rhythm; no murmur, rub, or gallop.   Abdomen:   Soft, non-tender, non-distended; normal bowel sounds; no masses, no organomegaly    Genitalia:   Deferred    Rectal:   Deferred    Extremities:  No cyanosis, clubbing or edema    Pulses:  2+ and symmetric    Skin:  No jaundice, rashes, or lesions    Lymph nodes:  No palpable cervical lymphadenopathy        Lab Results:   No visits with results within 1 Day(s) from this visit.   Latest known visit with results is:   Hospital Outpatient Visit on 01/31/2024   Component Date Value    Baseline HR 01/31/2024 58     Baseline BP 01/31/2024 152/90     O2 sat rest 01/31/2024 95     Stress peak HR 01/31/2024 77     Post peak BP 01/31/2024 150     O2 sat peak 01/31/2024 96     Recovery HR 01/31/2024 71     Recovery BP 01/31/2024 148/78     O2 sat recovery 01/31/2024 96     Rate Pressure Product 01/31/2024 11,550.0     Angina Index 01/31/2024 0     Rest Nuclear Isotope Dose 01/31/2024 11.00     Stress Nuclear Isotope D* 01/31/2024 31.00     Stress/rest perfusion ra* 01/31/2024 0.96     EF (%) 01/31/2024 76     Protocol Name 01/31/2024 [facility] WALK     Exercise duration (min) " 01/31/2024 3     Exercise duration (sec) 01/31/2024 0     Post Peak Systolic BP 01/31/2024 152     Max Diastolic Bp 01/31/2024 90     Peak HR 01/31/2024 77     Max Predicted Heart Rate 01/31/2024 155     Reason for Termination 01/31/2024 TEST COMPLETE...     Test Indication 01/31/2024 CHEST PAIN     Target Hr Formular 01/31/2024 (220 - Age)*100%     Chest Pain Statement 01/31/2024 none          Radiology Results:   XR knee 4+ vw left injury    Result Date: 2/27/2024  Narrative: LEFT KNEE INDICATION:   Pain in left knee. COMPARISON:  None. VIEWS:  XR KNEE 4+ VW LEFT INJURY Images: 4 FINDINGS: There is no acute fracture or dislocation. There is a moderate joint effusion. Mild osteoarthritis with small osteophytes seen. No lytic or blastic osseous lesion. Soft tissues are unremarkable.     Impression: Joint effusion. No acute osseous abnormality. Electronically signed: 02/27/2024 11:04 AM Andi Hernández, MPH,MD    VAS carotid complete study    Result Date: 2/23/2024  Narrative:  [facility] REPORT CLINICAL: Indications: 6 month surveillance of carotid artery disease.  Patient is asymptomatic at this time. Operative History: 2023-01-31 CEA Aborted 2021-08-01 SMA Stent Laparoscopic Surgery for infertility Risk Factors The patient has history of Obesity, HTN, Hyperlipidemia and previous smoking (quit 1-5yrs ago). Clinical Right Pressure:  124/82 mm Hg, Left Pressure:  130/80 mm Hg.  FINDINGS:  Right        Impression  PSV  EDV (cm/s)  Ratio  Mid. ICA                  87          25   0.75  Prox. ICA    1 - 49%      98          20   0.85  Dist CCA                  89          24         Mid CCA                  115          24   1.00  Prox CCA                 115          13   0.59  Ext Carotid              162          13   1.41  Prox Vert                 66          16         Subclavian               208           0         Innominate               194          18          Left         Impression  PSV  EDV (cm/s)   Ratio  Mid. ICA                  78          16   0.87  Prox. ICA    70 - 99%    409         115   4.57  Dist CCA                  78          18         Mid CCA                   90          22   1.07  Prox CCA                  83          15         Ext Carotid              208          15   2.32  Prox Vert                 23           7         Subclavian               184           0            CONCLUSION:  Impression RIGHT: There is <50% stenosis noted in the internal carotid artery. Plaque is heterogenous. Vertebral artery flow is antegrade. There is no significant subclavian artery disease.  LEFT: There is >70% stenosis noted in the internal carotid artery. Plaque is heterogenous. Vertebral artery flow is antegrade. There is no significant subclavian artery disease.  Compared to previous study on 9/12/2023, there is no significant change. Recommend repeat testing in 6 months as per protocol unless otherwise indicated.  SIGNATURE: Electronically Signed by: JUDITH NELSON DO, RPVI on 2024-02-23 03:39:57 PM    NM myocardial perfusion spect (rx stress and/or rest)    Result Date: 1/31/2024  Narrative:   Stress ECG: No ST deviation is noted. There were no arrhythmias during stress. There were no arrhythmias during recovery. The stress ECG is negative for ischemia after pharmacologic vasodilation, without reproduction of symptoms.   Stress ECG: A pharmacological stress test was performed using regadenoson. The patient experienced no angina during the test.   Perfusion: There is a left ventricular perfusion defect that is small in size with mild reduction in uptake present in the apical location(s) that is paradoxical. The defect appears to be probable artifact caused by soft tissue.   Stress Function: Left ventricular function post-stress is normal. Stress ejection fraction is 76%.   Stress Combined Conclusion: There is image artifact, without diagnostic evidence for perfusion abnormality.     Stress  strip    Result Date: 1/31/2024  Narrative: Confirmed by MIL FLORES ([id number]),  Nolvia Wolf ([id number]) on 1/31/2024 1:55:57 PM

## 2024-03-04 ENCOUNTER — TELEPHONE (OUTPATIENT)
Age: 64
End: 2024-03-04

## 2024-03-04 DIAGNOSIS — N30.10 INTERSTITIAL CYSTITIS: ICD-10-CM

## 2024-03-04 NOTE — TELEPHONE ENCOUNTER
Fax from [facility] pharmacy regarding notice of product discontinuation.   Effective February 2018, QVAR HFA inhalers (40 MCG and 80 MCG) are in the process of being discontinued by the  and will be replaced by QVAR HFA Redihalers (40 MCG and 80 MCG). As a result of this discontinuation, this therapy will no longer be available to patient once existing stocks are depleted.   Because this is a new delivery system, the replacement product will require a new prescription. If continued therapy is required, a new prescription will be necessary.          Patient called stating pharmacy is advising that Meth-Hyo-M Bl-Na Phos-Ph Sal (Ustell) 120 MG CAPS requires a prior authorization.Patient is currently using AZO.

## 2024-03-05 ENCOUNTER — OFFICE VISIT (OUTPATIENT)
Age: 64
End: 2024-03-05
Payer: COMMERCIAL

## 2024-03-05 VITALS
DIASTOLIC BLOOD PRESSURE: 89 MMHG | TEMPERATURE: 96.7 F | SYSTOLIC BLOOD PRESSURE: 130 MMHG | HEIGHT: 63 IN | BODY MASS INDEX: 33.63 KG/M2 | WEIGHT: 189.8 LBS | HEART RATE: 57 BPM

## 2024-03-05 DIAGNOSIS — I10 ESSENTIAL HYPERTENSION: ICD-10-CM

## 2024-03-05 DIAGNOSIS — G47.33 OSA (OBSTRUCTIVE SLEEP APNEA): ICD-10-CM

## 2024-03-05 DIAGNOSIS — E66.9 OBESITY (BMI 30.0-34.9): Primary | ICD-10-CM

## 2024-03-05 DIAGNOSIS — K76.0 HEPATIC STEATOSIS: ICD-10-CM

## 2024-03-05 PROCEDURE — 99204 OFFICE O/P NEW MOD 45 MIN: CPT | Performed by: PHYSICIAN ASSISTANT

## 2024-03-05 NOTE — PATIENT INSTRUCTIONS
- Discussed options of HealthyCORE-Intensive Lifestyle Intervention Program, Very Low Calorie Diet-VLCD, and Conservative Program and the role of weight loss medications.  - Explained the importance of making lifestyle changes first before starting anti-obesity medications.  - Patient should demonstrate lifestyle changes first before anti-obesity medication initiated.   - Patient is interested in pursuing Conservative Program  - Initial weight loss goal of 5-10% weight loss for improved health as studies have shown this is where we see the greatest impact on improving health and decreasing risk of obesity related conditions.  - Weight loss can improve patient's co-morbid conditions and/or prevent weight-related complications.  - Stop Bang : CAMILLE   - Labs reviewed: As below.      General Recommendations:  Nutrition:  Eat breakfast daily.  Do not skip meals.     Food log (ie.) www.myfitnesspal.com, sparkpeople.com, loseit.com, calorieking.com, etc.    Practice mindful eating.  Be sure to set aside time to eat, eat slowly, and savor your food.    Hydration:    At least 64oz of water daily.  No sugar sweetened beverages.  No juice (eat the fruit instead).    Exercise:  Studies have shown that the ideal exercise goal is somewhere between 150 to 300 minutes of moderate intensity exercise a week.  Start with exercising 10 minutes every other day and gradually increase physical activity with a goal of at least 150 minutes of moderate intensity exercise a week, divided over at least 3 days a week.  An example of this would be exercising 30 minutes a day, 5 days a week.  Resistance training can increase muscle mass and increase our resting metabolic rate.   FULL BODY resistance training is recommended 2-3 times a week.  Do not do this on consecutive days to allow for muscle recovery.    Aim for a bare minimum 5000 steps, even on days you do not exercise.    Monitoring:   Weigh yourself daily.  If this causes undue stress, then  just weigh yourself once a week.  Weigh yourself the same time of the day with the same amount of clothing on.  Preferably this should be done after waking up, before you eat, and with no clothing or minimal clothing on.    Specific Goals:  No sugary beverages. At least 64oz of water daily.  Goal protein intake of 60-80 grams per day  Gradually increase physical activity to a goal of 5 days per week for 30 minutes of MODERATE intensity PLUS 2 days per week of FULL BODY resistance training    Calorie goal:  1796-0329 ethan/day (Provided with meal plan to follow).    Return visit:    Calorie tracking  Increased ambulation - 30 minutes 5 days/week  Not a good candidate for phentermine or GLP1 RA  Return to clinic 2 months

## 2024-03-05 NOTE — TELEPHONE ENCOUNTER
PA for [facility]    Submitted via    [x]CMM-KEY [id number]       [][employer]-Case ID #   []Faxed to plan   []Other website   []Phone call Case ID #     Office notes sent, clinical questions answered. Awaiting determination    Turnaround time for your insurance to make a decision on your Prior Authorization can take 7-21 business days.

## 2024-03-05 NOTE — PROGRESS NOTES
Assessment/Plan:  Adina was seen today for consult.    Diagnoses and all orders for this visit:    Obesity (BMI 30.0-34.9)  -     Ambulatory Referral to Weight Management    Hepatic steatosis    CAMILLE (obstructive sleep apnea)    Essential hypertension       - Discussed options of HealthyCORE-Intensive Lifestyle Intervention Program, Very Low Calorie Diet-VLCD, and Conservative Program and the role of weight loss medications.  - Explained the importance of making lifestyle changes first before starting anti-obesity medications.  - Patient should demonstrate lifestyle changes first before anti-obesity medication initiated.   - Patient is interested in pursuing Conservative Program  - Initial weight loss goal of 5-10% weight loss for improved health as studies have shown this is where we see the greatest impact on improving health and decreasing risk of obesity related conditions.  - Weight loss can improve patient's co-morbid conditions and/or prevent weight-related complications.  - Stop Bang : CAMILLE   - Labs reviewed: As below.      General Recommendations:  Nutrition:  Eat breakfast daily.  Do not skip meals.     Food log (ie.) www.[employer].com, sparkpeople.com, loseit.com, calorieking.com, etc.    Practice mindful eating.  Be sure to set aside time to eat, eat slowly, and savor your food.    Hydration:    At least 64oz of water daily.  No sugar sweetened beverages.  No juice (eat the fruit instead).    Exercise:  Studies have shown that the ideal exercise goal is somewhere between 150 to 300 minutes of moderate intensity exercise a week.  Start with exercising 10 minutes every other day and gradually increase physical activity with a goal of at least 150 minutes of moderate intensity exercise a week, divided over at least 3 days a week.  An example of this would be exercising 30 minutes a day, 5 days a week.  Resistance training can increase muscle mass and increase our resting metabolic rate.   FULL BODY  resistance training is recommended 2-3 times a week.  Do not do this on consecutive days to allow for muscle recovery.    Aim for a bare minimum 5000 steps, even on days you do not exercise.    Monitoring:   Weigh yourself daily.  If this causes undue stress, then just weigh yourself once a week.  Weigh yourself the same time of the day with the same amount of clothing on.  Preferably this should be done after waking up, before you eat, and with no clothing or minimal clothing on.    Specific Goals:  No sugary beverages. At least 64oz of water daily.  Goal protein intake of 60-80 grams per day  Gradually increase physical activity to a goal of 5 days per week for 30 minutes of MODERATE intensity PLUS 2 days per week of FULL BODY resistance training    Calorie goal:  [id number] ethan/day (Provided with meal plan to follow).    Return visit:    Calorie tracking  Increased ambulation - 30 minutes 5 days/week  Not a good candidate for phentermine or GLP1 RA  Return to clinic 2 months        Total time spent reviewing chart, interviewing patient, examining patient, discussing plan, answering all questions, and documentin min.       ______________________________________________________________________        Subjective:   Chief Complaint   Patient presents with    Consult     Mwm consult       HPI: Nancy Jones  is a 64 y.o. female with history of hepatic steatosis, GERD, CAMILLE, COPD, CHF, atrial fibrillation, HTN, chronic migraines, urinary retention, macular degeneration, and excess weight, here to pursue weight loss management.  Previous notes and records have been reviewed.    Patient was scheduled for a robotic incisional hernia repair 24 which was canceled. Referred to weight management by general surgery. Goal of BMI of 26.   S/p small bowel resection.     has CAMILLE - not using CPAP    Diet - portion control, healthier foods  Exercise - none    Receiving B12 injections.     Not a good candidate for phentermine  "d/t cardiac history and hx of urinary retention      HPI  Wt Readings from Last 20 Encounters:   03/05/24 86.1 kg (189 lb 12.8 oz)   03/01/24 86.6 kg (191 lb)   02/28/24 84.4 kg (186 lb)   02/27/24 84.4 kg (186 lb)   02/26/24 85 kg (187 lb 6.4 oz)   02/01/24 83.9 kg (185 lb)   01/31/24 86.2 kg (190 lb)   01/25/24 86.2 kg (190 lb)   01/24/24 86.7 kg (191 lb 2.2 oz)   01/22/24 86.2 kg (190 lb)   01/19/24 84.4 kg (186 lb)   12/29/23 83.9 kg (185 lb)   12/21/23 83.9 kg (185 lb)   11/30/23 81.6 kg (180 lb)   11/30/23 81.6 kg (180 lb)   11/16/23 81.2 kg (179 lb)   10/25/23 81.6 kg (179 lb 14.4 oz)   10/09/23 80.3 kg (177 lb)   10/06/23 82.1 kg (181 lb)   10/03/23 80.7 kg (178 lb)     Excess Weight:  Highest weight: 234 lbs (1 year ago)  Lowest Weight: 128 lbs (1983)  Current weight: 189 lbs  Goal: 145 lbss  What has been tried: Diet and Exercise  Contributing factors: Poor Food Choices and Insufficient Physical Activity  Associated symptoms and effects: comorbid conditions    Food logging:  B: eggs, Croatian toast, cereal + milkd  S: banana/raspberries   L: tuna fish, cereal  S: potato chips  D: pancakes    Hydration: Water - 1.5 L    Snapple Iced tea - 1 glass  Alcohol: none  Smoking: none  Exercise: none  Sleep: 5-9 hours  STOP-BANG Score: has CAMILLE - not using CPAP  Occupation: retired      Past Medical History:   Diagnosis Date    A-fib ([facility]) 03/2020    Allergic     Anxiety     Arthritis     Asthma     Back pain     and muscle pain    Bruises easily     Chronic pain disorder     Interstitial pain    Colon polyp     Dental bridge present     Depression     Eczema     GERD (gastroesophageal reflux disease)     Heart murmur     History of blood clots     per pt \"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\"    History of pneumonia     Hives     Hyperlipidemia     Hypertension     Infertility, female     Interstitial cystitis     Migraine     sees neurologist    Motion sickness     Obesity     Palpitations     " "PONV (postoperative nausea and vomiting)     Premature atrial contractions 11/09/2022    Psychiatric disorder     TIA (transient ischemic attack) 09/2022    per pt --\"had MRI and 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\"    Urinary incontinence     wears Pads    Venous insufficiency 08/01/2017    Wears glasses      Patient denies personal and family history of  pancreatitis, thyroid cancer, MEN-2 tumors.  Denies any hx of glaucoma, seizures, kidney stones, gallstones.  Denies Hx of CAD, PAD, palpitations, arrhythmia.   Denies uncontrolled anxiety or depression, suicidal behavior or thinking , insomnia or sleep disturbance.     Past Surgical History:   Procedure Laterality Date    COLONOSCOPY      DILATION AND CURETTAGE OF UTERUS      EGD      EXPLORATORY LAPAROTOMY      for infertility    EXPLORATORY LAPAROTOMY W/ BOWEL RESECTION N/A 7/19/2023    Procedure: LAPAROTOMY EXPLORATORY W/ BOWEL RESECTION;  Surgeon: Crista Recinos MD;  Location: [facility];  Service: General    HAND SURGERY      Hand excision of tendon cyst    GA LAPAROSCOPIC APPENDECTOMY N/A 05/03/2022    Procedure: APPENDECTOMY LAPAROSCOPIC;  Surgeon: Vin Fields MD;  Location: [facility];  Service: General    GA LAPS ABD PRTM&OMENTUM DX W/WO SPEC BR/WA SPX N/A 7/19/2023    Procedure: LAPAROSCOPY DIAGNOSTIC, LYSIS OF ADHESIONS, LAPAROSCOPY TAKE TAKEDOWN OF LEFT COLON, EXPLORATORY LAPAROSCOPY, LYSIS OF ADHESIONS, RESECTION OF TRANSVERSE COLON SPLENIC FLEXURE AND DESCENDING COLON , TAKE DOWN OF SPLENIC FLEXURE, SIGMOIDOSCOPY, MOBILIZATION OF RIGHT COLON, PRIMARY ANASTOMOSIS EEA 29, TAP BLOCK;  Surgeon: Crista Recinos MD;  Location[facility];  Service: General    GA TEAEC W/PATCH GRF CAROTID VERTB SUBCLAV NECK INC Left 1/31/2023    Procedure: EXPLORATION OF LEFT CAROTID ARTERY; ABORTED ENDARTERECTOMY ARTERY CAROTID;  Surgeon: Roberto García DO;  Location: [facility];  Service: Vascular    SUPERIOR MESTENTERIC ARTERY STENT      WISDOM TOOTH " "EXTRACTION       The following portions of the patient's history were reviewed and updated as appropriate: allergies, current medications, past family history, past medical history, past social history, past surgical history, and problem list.    Review Of Systems:  Review of Systems   Constitutional:  Negative for fatigue and fever.   HENT:  Negative for sore throat, trouble swallowing and voice change.    Respiratory:  Negative for shortness of breath.    Cardiovascular:  Negative for chest pain and palpitations.   Gastrointestinal:  Positive for constipation (takes miralax) and nausea (periodically since bowel resection). Negative for abdominal pain, diarrhea and vomiting.        + GERD - on medications   Endocrine: Negative for cold intolerance and heat intolerance.   Genitourinary:  Negative for difficulty urinating.   Musculoskeletal:  Negative for arthralgias and back pain.   Psychiatric/Behavioral:  Negative for suicidal ideas.         + anxiety - on ativan, well controlled  Denies depression    All other systems reviewed and are negative.      Objective:  /89   Pulse 57   Temp (!) 96.7 °F (35.9 °C) (Tympanic)   Ht 5' 2.5\" (1.588 m)   Wt 86.1 kg (189 lb 12.8 oz)   LMP  (LMP Unknown)   BMI 34.16 kg/m²   Physical Exam  Vitals and nursing note reviewed.   Constitutional:       General: She is not in acute distress.     Appearance: Normal appearance. She is obese. She is not ill-appearing, toxic-appearing or diaphoretic.   HENT:      Head: Normocephalic and atraumatic.   Eyes:      General:         Right eye: No discharge.         Left eye: No discharge.      Conjunctiva/sclera: Conjunctivae normal.   Cardiovascular:      Rate and Rhythm: Normal rate and regular rhythm.      Heart sounds: Normal heart sounds. No murmur heard.  Pulmonary:      Effort: Pulmonary effort is normal. No respiratory distress.   Musculoskeletal:         General: Normal range of motion.      Cervical back: Normal range of " motion and neck supple. No rigidity or tenderness.      Right lower leg: No edema.      Left lower leg: No edema.   Skin:     Coloration: Skin is not pale.      Findings: No erythema or rash.   Neurological:      General: No focal deficit present.      Mental Status: She is alert.   Psychiatric:         Mood and Affect: Mood normal.         Labs and Imaging  Recent labs and imaging have been personally reviewed.  Lab Results   Component Value Date    WBC 5.15 01/24/2024    HGB 12.4 01/24/2024    HCT 38.6 01/24/2024    MCV 91 01/24/2024     01/24/2024     Lab Results   Component Value Date     11/13/2017    SODIUM 141 01/24/2024    K 3.6 01/24/2024     (H) 01/24/2024    CO2 23 01/24/2024    AGAP 7 01/24/2024    BUN 17 01/24/2024    CREATININE 0.68 01/24/2024    GLUC 80 01/24/2024    GLUF 91 01/22/2024    CALCIUM 6.9 (L) 01/24/2024    AST 11 (L) 01/24/2024    ALT 10 01/24/2024    ALKPHOS 54 01/24/2024    PROT 7.0 11/13/2017    TP 5.0 (L) 01/24/2024    BILITOT 0.3 11/13/2017    TBILI 0.27 01/24/2024    EGFR 92 01/24/2024     Lab Results   Component Value Date    HGBA1C 5.5 05/23/2023     Lab Results   Component Value Date    XUM6UAGTNTVU 4.646 (H) 01/24/2024    TSH 1.05 10/22/2021     Lab Results   Component Value Date    CHOLESTEROL 220 (H) 05/23/2023     Lab Results   Component Value Date    HDL 58 05/23/2023     Lab Results   Component Value Date    TRIG 257 (H) 05/23/2023     Lab Results   Component Value Date    LDLCALC 111 (H) 05/23/2023

## 2024-03-06 ENCOUNTER — CLINICAL SUPPORT (OUTPATIENT)
Dept: CARDIOLOGY CLINIC | Facility: CLINIC | Age: 64
End: 2024-03-06
Payer: COMMERCIAL

## 2024-03-06 DIAGNOSIS — R00.2 PALPITATIONS: ICD-10-CM

## 2024-03-06 PROCEDURE — 93248 EXT ECG>7D<15D REV&INTERPJ: CPT | Performed by: INTERNAL MEDICINE

## 2024-03-07 NOTE — TELEPHONE ENCOUNTER
Yes lets just have her continue with Pyridium.  I would definitely prefer Uribel but it is what it is.

## 2024-03-08 ENCOUNTER — TELEPHONE (OUTPATIENT)
Age: 64
End: 2024-03-08

## 2024-03-08 ENCOUNTER — TELEPHONE (OUTPATIENT)
Dept: UROLOGY | Facility: CLINIC | Age: 64
End: 2024-03-08

## 2024-03-08 DIAGNOSIS — N30.10 INTERSTITIAL CYSTITIS: ICD-10-CM

## 2024-03-08 RX ORDER — PHENAZOPYRIDINE HYDROCHLORIDE 200 MG/1
200 TABLET, FILM COATED ORAL AS NEEDED
Qty: 30 TABLET | Refills: 0 | Status: SHIPPED | OUTPATIENT
Start: 2024-03-08 | End: 2024-03-08 | Stop reason: SDUPTHER

## 2024-03-08 RX ORDER — PHENAZOPYRIDINE HYDROCHLORIDE 200 MG/1
200 TABLET, FILM COATED ORAL 2 TIMES DAILY PRN
Qty: 30 TABLET | Refills: 0 | Status: SHIPPED | OUTPATIENT
Start: 2024-03-08

## 2024-03-08 NOTE — TELEPHONE ENCOUNTER
Received incoming call from patients [facility]. Pharmacist needs script for pyridium to include how many times medication can be taken prn on a daily basis, such as q8h prn. Will send to JUD Ledesma to update script to the pharmacy.

## 2024-03-08 NOTE — TELEPHONE ENCOUNTER
Uribel was unfortunately denied by insurance, discuss with Dr. Hobson and recommendations are to continue with pyridium as needed.

## 2024-03-10 DIAGNOSIS — K21.9 GASTROESOPHAGEAL REFLUX DISEASE WITHOUT ESOPHAGITIS: ICD-10-CM

## 2024-03-11 DIAGNOSIS — R11.0 NAUSEA: ICD-10-CM

## 2024-03-11 RX ORDER — CHLORDIAZEPOXIDE HYDROCHLORIDE AND CLIDINIUM BROMIDE 5; 2.5 MG/1; MG/1
1 CAPSULE ORAL DAILY PRN
Qty: 90 CAPSULE | Refills: 0 | Status: SHIPPED | OUTPATIENT
Start: 2024-03-11

## 2024-03-11 RX ORDER — PROMETHAZINE HYDROCHLORIDE 25 MG/1
25 TABLET ORAL EVERY 6 HOURS PRN
Qty: 60 TABLET | Refills: 5 | Status: SHIPPED | OUTPATIENT
Start: 2024-03-11

## 2024-03-12 DIAGNOSIS — E78.49 OTHER HYPERLIPIDEMIA: ICD-10-CM

## 2024-03-12 RX ORDER — EVOLOCUMAB 140 MG/ML
1 INJECTION, SOLUTION SUBCUTANEOUS
Qty: 2 ML | Refills: 11 | Status: SHIPPED | OUTPATIENT
Start: 2024-03-12

## 2024-03-12 NOTE — TELEPHONE ENCOUNTER
Fax received from [facility] requesting refill for Repatha.

## 2024-03-14 ENCOUNTER — PATIENT OUTREACH (OUTPATIENT)
Dept: FAMILY MEDICINE CLINIC | Facility: CLINIC | Age: 64
End: 2024-03-14

## 2024-03-14 ENCOUNTER — NURSE TRIAGE (OUTPATIENT)
Age: 64
End: 2024-03-14

## 2024-03-14 DIAGNOSIS — R39.9 UTI SYMPTOMS: Primary | ICD-10-CM

## 2024-03-14 RX ORDER — NITROFURANTOIN 25; 75 MG/1; MG/1
100 CAPSULE ORAL 2 TIMES DAILY
Qty: 14 CAPSULE | Refills: 0 | Status: SHIPPED | OUTPATIENT
Start: 2024-03-14 | End: 2024-03-15

## 2024-03-14 NOTE — TELEPHONE ENCOUNTER
"Spoke to patient and she stated that about two days ago she started experiencing increased painful urination. Some abdominal discomfort and the pain level varies from seven to ten at times. She is not experiencing any hematuria. She does also note increased urinary frequency and urgency. She has a history of kidney stones and recurrent urinary tract infection. She denies any vomiting and states she always has ongoing nausea. She does not have any fevers, chills or shakes at this time. She was given care advice to hydrate well with 40-60 ounces of water, avoid bladder irritants and utilize a heating pad or moist heating pad to abdomen and back to help with discomfort. We reviewed ER precautions. Placed urine orders. Please advise of further recommendations and imaging if recommended.   Reason for Disposition   All other urine symptoms    Answer Assessment - Initial Assessment Questions  1. SYMPTOM: \"What's the main symptom you're concerned about?\" (e.g., frequency, incontinence)      Painful urination   2. ONSET: \"When did the  symptoms  start?\"      Two days ago  3. PAIN: \"Is there any pain?\" If Yes, ask: \"How bad is it?\" (Scale: 1-10; mild, moderate, severe)      Lower abdomen pain  pain scale of 7   4. CAUSE: \"What do you think is causing the symptoms?\"      Kidney stones   5. OTHER SYMPTOMS: \"Do you have any other symptoms?\" (e.g., fever, flank pain, blood in urine, pain with urination)      No fevers chills shakes or sweat. She always has nausea.    Protocols used: Urinary Symptoms-ADULT-[place]    "

## 2024-03-14 NOTE — TELEPHONE ENCOUNTER
I agree with urine testing.  I did send a prescription for Macrobid to patient's pharmacy.  Please ensure patient has urine testing completed prior to starting this.  We will call her if this needs to be changed based on her final culture and sensitivity results.

## 2024-03-14 NOTE — TELEPHONE ENCOUNTER
"Regarding: Patient called today c/o pain with urination, bloated stomach hurts  ----- Message from Heidi Borden sent at 3/14/2024  2:42 PM EDT -----  Patient called today c/o pain with urination, bloated stomach hurts, abdomen feels like its deflating while urinating and \"hurts really bad\".    \"The main thing is the pain while going to the bathroom\".    Patient also states that last night while in bed, she felt like the pain was \"really bad\" and questions whether she passed a stone.    Patient declines any other symptoms.    Patient asks if urine tests could be placed in chart?    Call back [phone]    "

## 2024-03-15 ENCOUNTER — OFFICE VISIT (OUTPATIENT)
Dept: FAMILY MEDICINE CLINIC | Facility: CLINIC | Age: 64
End: 2024-03-15
Payer: MEDICARE

## 2024-03-15 ENCOUNTER — APPOINTMENT (OUTPATIENT)
Dept: LAB | Facility: HOSPITAL | Age: 64
End: 2024-03-15
Payer: MEDICARE

## 2024-03-15 VITALS
SYSTOLIC BLOOD PRESSURE: 120 MMHG | WEIGHT: 188 LBS | HEIGHT: 64 IN | OXYGEN SATURATION: 95 % | DIASTOLIC BLOOD PRESSURE: 80 MMHG | TEMPERATURE: 97.8 F | HEART RATE: 60 BPM | BODY MASS INDEX: 32.1 KG/M2

## 2024-03-15 DIAGNOSIS — I65.22 STENOSIS OF LEFT CAROTID ARTERY: ICD-10-CM

## 2024-03-15 DIAGNOSIS — K55.9 COLONIC ISCHEMIA (HCC): ICD-10-CM

## 2024-03-15 DIAGNOSIS — I10 ESSENTIAL HYPERTENSION: ICD-10-CM

## 2024-03-15 DIAGNOSIS — I50.33 ACUTE ON CHRONIC DIASTOLIC (CONGESTIVE) HEART FAILURE (HCC): Primary | ICD-10-CM

## 2024-03-15 DIAGNOSIS — R39.9 UTI SYMPTOMS: ICD-10-CM

## 2024-03-15 LAB
BACTERIA UR QL AUTO: ABNORMAL /HPF
BILIRUB UR QL STRIP: NEGATIVE
CLARITY UR: ABNORMAL
COLOR UR: ABNORMAL
GLUCOSE UR STRIP-MCNC: NEGATIVE MG/DL
HGB UR QL STRIP.AUTO: NEGATIVE
KETONES UR STRIP-MCNC: NEGATIVE MG/DL
LEUKOCYTE ESTERASE UR QL STRIP: ABNORMAL
MUCOUS THREADS UR QL AUTO: ABNORMAL
NITRITE UR QL STRIP: NEGATIVE
NON-SQ EPI CELLS URNS QL MICRO: ABNORMAL /HPF
PH UR STRIP.AUTO: 6 [PH]
PROT UR STRIP-MCNC: ABNORMAL MG/DL
RBC #/AREA URNS AUTO: ABNORMAL /HPF
SP GR UR STRIP.AUTO: 1.02 (ref 1–1.03)
UROBILINOGEN UR STRIP-ACNC: <2 MG/DL
WBC #/AREA URNS AUTO: ABNORMAL /HPF
WBC CLUMPS # UR AUTO: PRESENT /UL

## 2024-03-15 PROCEDURE — 87186 SC STD MICRODIL/AGAR DIL: CPT

## 2024-03-15 PROCEDURE — 99214 OFFICE O/P EST MOD 30 MIN: CPT | Performed by: FAMILY MEDICINE

## 2024-03-15 PROCEDURE — 87077 CULTURE AEROBIC IDENTIFY: CPT

## 2024-03-15 PROCEDURE — 87086 URINE CULTURE/COLONY COUNT: CPT

## 2024-03-15 RX ORDER — METOPROLOL SUCCINATE 100 MG/1
100 TABLET, EXTENDED RELEASE ORAL 2 TIMES DAILY
Qty: 180 TABLET | Refills: 1 | Status: SHIPPED | OUTPATIENT
Start: 2024-03-15

## 2024-03-15 NOTE — PROGRESS NOTES
"Assessment/Plan: Labs and records reviewed.  Patient will wean off medications at her request.  Patient will switch diltiazem to the evening.  Continue with metoprolol.  Refills given at this time.  CHF stable at this time.  Blood pressure stable.  Patient will wean off Singulair as well as multiple vitamins.  Patient will stop sucralfate.  Patient will follow with vascular specialist for carotid stenosis.  Follow-up 3 months or as needed.       Diagnoses and all orders for this visit:    Acute on chronic diastolic (congestive) heart failure (HCC)    Essential hypertension  -     metoprolol succinate (TOPROL-XL) 100 mg 24 hr tablet; Take 1 tablet (100 mg total) by mouth 2 (two) times a day    Colonic ischemia (HCC)    Stenosis of left carotid artery            Subjective:        Patient ID: Nancy Jones is a 64 y.o. female.      Patient is here to follow-up on chronic conditions.  Patient wishes to reduce medications overall.  Stable overall.    Medication Refill  Associated symptoms include arthralgias.   Knee Pain           The following portions of the patient's history were reviewed and updated as appropriate: allergies, current medications, past family history, past medical history, past social history, past surgical history and problem list.      Review of Systems   Constitutional:  Positive for unexpected weight change.   HENT: Negative.     Eyes: Negative.    Respiratory: Negative.     Cardiovascular: Negative.    Gastrointestinal: Negative.    Endocrine: Negative.    Genitourinary: Negative.    Musculoskeletal:  Positive for arthralgias.   Skin: Negative.    Allergic/Immunologic: Negative.    Neurological: Negative.    Hematological: Negative.    Psychiatric/Behavioral: Negative.             Objective:        Depression Screening and Follow-up Plan: Clincally patient does not have depression. No treatment is required.             /80   Pulse 60   Temp 97.8 °F (36.6 °C)   Ht 5' 4\" (1.626 m)   Wt " 85.3 kg (188 lb)   LMP  (LMP Unknown)   SpO2 95%   BMI 32.27 kg/m²          Physical Exam  Vitals and nursing note reviewed.   Constitutional:       General: She is not in acute distress.     Appearance: Normal appearance. She is not ill-appearing, toxic-appearing or diaphoretic.   HENT:      Head: Normocephalic and atraumatic.      Right Ear: Tympanic membrane, ear canal and external ear normal. There is no impacted cerumen.      Left Ear: Tympanic membrane, ear canal and external ear normal. There is no impacted cerumen.      Nose: Nose normal. No congestion or rhinorrhea.      Mouth/Throat:      Mouth: Mucous membranes are moist.      Pharynx: No oropharyngeal exudate or posterior oropharyngeal erythema.   Eyes:      General: No scleral icterus.        Right eye: No discharge.         Left eye: No discharge.   Neck:      Vascular: No carotid bruit.   Cardiovascular:      Rate and Rhythm: Normal rate and regular rhythm.      Pulses: Normal pulses.      Heart sounds: Normal heart sounds. No murmur heard.     No friction rub. No gallop.   Pulmonary:      Effort: Pulmonary effort is normal. No respiratory distress.      Breath sounds: Normal breath sounds. No stridor. No wheezing, rhonchi or rales.   Chest:      Chest wall: No tenderness.   Musculoskeletal:         General: No swelling, tenderness, deformity or signs of injury. Normal range of motion.      Cervical back: Normal range of motion and neck supple. No rigidity. No muscular tenderness.      Right lower leg: No edema.      Left lower leg: No edema.   Lymphadenopathy:      Cervical: No cervical adenopathy.   Skin:     General: Skin is warm and dry.      Capillary Refill: Capillary refill takes less than 2 seconds.      Coloration: Skin is not jaundiced.      Findings: No bruising, erythema, lesion or rash.   Neurological:      Mental Status: She is alert and oriented to person, place, and time.      Cranial Nerves: No cranial nerve deficit.      Sensory:  No sensory deficit.      Motor: No weakness.      Coordination: Coordination normal.      Gait: Gait normal.   Psychiatric:         Mood and Affect: Mood normal.         Behavior: Behavior normal.         Thought Content: Thought content normal.         Judgment: Judgment normal.

## 2024-03-17 LAB — BACTERIA UR CULT: ABNORMAL

## 2024-03-18 DIAGNOSIS — N30.00 ACUTE CYSTITIS WITHOUT HEMATURIA: Primary | ICD-10-CM

## 2024-03-18 LAB — BACTERIA UR CULT: ABNORMAL

## 2024-03-18 RX ORDER — GRANULES FOR ORAL 3 G/1
3 POWDER ORAL ONCE
Qty: 3 G | Refills: 0 | Status: SHIPPED | OUTPATIENT
Start: 2024-03-18 | End: 2024-03-18

## 2024-03-18 NOTE — TELEPHONE ENCOUNTER
Patient called in regarding her urine culture results. Relayed note attached to urine culture results re; if symptomatic and may need treatment at infusion center d/t antibiotic resistance.   ]  Patient reports burning w/ void, itching, crampiness and feels she isn't emptying bladder. Denies fever, N/V. Patient would like call back to discuss antibiotic infusion option.     # [phone]

## 2024-03-18 NOTE — TELEPHONE ENCOUNTER
Spoke with patient on the phone and discussed urine culture results and symptoms.  I sent a prescription for fosfomycin to her pharmacy.

## 2024-03-18 NOTE — RESULT ENCOUNTER NOTE
Please follow-up with patient if she is currently experiencing any lower urinary tract symptoms concerning for infection.  Her urine culture is positive however there is significant antibiotic resistance.  I only recommend treatment if she is symptomatic.  If she is symptomatic we will need to likely set her up for antibiotics at the infusion center.

## 2024-03-19 DIAGNOSIS — M54.16 LUMBAR RADICULOPATHY: ICD-10-CM

## 2024-03-19 RX ORDER — METHOCARBAMOL 500 MG/1
500 TABLET, FILM COATED ORAL 3 TIMES DAILY
Qty: 90 TABLET | Refills: 0 | Status: SHIPPED | OUTPATIENT
Start: 2024-03-19

## 2024-03-21 ENCOUNTER — PATIENT OUTREACH (OUTPATIENT)
Dept: FAMILY MEDICINE CLINIC | Facility: CLINIC | Age: 64
End: 2024-03-21

## 2024-03-21 RX ORDER — GRANULES FOR ORAL 3 G/1
3 POWDER ORAL ONCE
Qty: 3 G | Refills: 0 | Status: SHIPPED | OUTPATIENT
Start: 2024-03-21 | End: 2024-03-21

## 2024-03-21 NOTE — TELEPHONE ENCOUNTER
Still having cramping, cloudy urine, pressure and burning. Pt is asking if she can have another dose of the medication. Please advise

## 2024-03-21 NOTE — PROGRESS NOTES
Contacted patient for f/u.  She continues to work on losing weight in order to proceed with hernia repair.  Her current weight is 187 and she has lost 2 lbs.  She does have constant nausea, denies vomiting.  She takes phenergan as needed.  She did see weight management and has diet materials to review.  She weighs daily and weight has been stable.  Reviewed weight parameters to monitor and report.  She denies chest pain or LE edema.  She is sob with climbing stairs.  BP stable at 125/72.  She had urinary tract infection and took one dose of fosfomycin.  She felt her symptoms were slightly improved but then returned.  She c/o abdominal pain, bloating, burning with urination, cloudy urine.  She did receive call from urology and she will be receiving another dose of fosfomycin.  She is forcing  fluids.  She takes all medications as prescribed.  Follow up appointments scheduled.  She has not been exercising, encouraged her to take daily walks when able.  She is working with Aging on obtaining a secondary insurance to help with her medical bills.   She denies any needs at this time and is agreeable to continued outreach.

## 2024-03-22 ENCOUNTER — TELEPHONE (OUTPATIENT)
Dept: UROLOGY | Facility: CLINIC | Age: 64
End: 2024-03-22

## 2024-03-22 NOTE — TELEPHONE ENCOUNTER
----- Message from JUD Blanc sent at 3/18/2024  7:32 AM EDT -----  Please follow-up with patient if she is currently experiencing any lower urinary tract symptoms concerning for infection.  Her urine culture is positive however there is significant antibiotic resistance.  I only recommend treatment if she is symptomatic.  If she is symptomatic we will need to likely set her up for antibiotics at the infusion center.

## 2024-03-25 DIAGNOSIS — R39.9 UTI SYMPTOMS: Primary | ICD-10-CM

## 2024-03-25 DIAGNOSIS — R39.82 CHRONIC BLADDER PAIN: ICD-10-CM

## 2024-03-25 DIAGNOSIS — N30.10 INTERSTITIAL CYSTITIS: ICD-10-CM

## 2024-03-25 NOTE — TELEPHONE ENCOUNTER
Please call her to set her up for cystoscopy with me in the near future.  I do not have any other easy answers right now.

## 2024-03-25 NOTE — TELEPHONE ENCOUNTER
LM for patient that call the other day was about results that she was already previously informed on. Informed that as long as she was doing well, nothing further at this time.

## 2024-03-26 ENCOUNTER — PATIENT MESSAGE (OUTPATIENT)
Dept: FAMILY MEDICINE CLINIC | Facility: CLINIC | Age: 64
End: 2024-03-26

## 2024-03-26 DIAGNOSIS — M25.562 ARTHRALGIA OF BOTH KNEES: Primary | ICD-10-CM

## 2024-03-26 DIAGNOSIS — M25.561 ARTHRALGIA OF BOTH KNEES: Primary | ICD-10-CM

## 2024-03-27 DIAGNOSIS — F41.9 ANXIETY: ICD-10-CM

## 2024-03-27 DIAGNOSIS — M54.16 LUMBAR RADICULOPATHY: ICD-10-CM

## 2024-03-27 DIAGNOSIS — I48.0 PAROXYSMAL ATRIAL FIBRILLATION (HCC): ICD-10-CM

## 2024-03-27 DIAGNOSIS — Z90.49 S/P SMALL BOWEL RESECTION: ICD-10-CM

## 2024-03-27 RX ORDER — TRAMADOL HYDROCHLORIDE 50 MG/1
100 TABLET ORAL EVERY 8 HOURS PRN
Qty: 180 TABLET | Refills: 0 | Status: SHIPPED | OUTPATIENT
Start: 2024-03-27

## 2024-03-27 RX ORDER — LORAZEPAM 1 MG/1
1 TABLET ORAL 3 TIMES DAILY
Qty: 90 TABLET | Refills: 0 | Status: SHIPPED | OUTPATIENT
Start: 2024-03-27

## 2024-03-27 RX ORDER — AMIODARONE HYDROCHLORIDE 200 MG/1
200 TABLET ORAL DAILY
Qty: 30 TABLET | Refills: 0 | Status: SHIPPED | OUTPATIENT
Start: 2024-03-27

## 2024-03-27 NOTE — TELEPHONE ENCOUNTER
Contacted Adina and made her aware Dr. Hobson wishes to perform cystoscopy. Patient scheduled for cysto with Dr. Hobson in the [place] office on 4/30/24 @ 1:00 pm. She is aware of office location and to arrive 15 minutes early with a comfortably full bladder.

## 2024-03-29 DIAGNOSIS — N30.10 INTERSTITIAL CYSTITIS: ICD-10-CM

## 2024-04-01 RX ORDER — PHENAZOPYRIDINE HYDROCHLORIDE 200 MG/1
200 TABLET, FILM COATED ORAL 2 TIMES DAILY PRN
Qty: 30 TABLET | Refills: 0 | Status: SHIPPED | OUTPATIENT
Start: 2024-04-01

## 2024-04-05 NOTE — ASSESSMENT & PLAN NOTE
· Patient was admitted under medicine with suspicion for colitis.   · Patient found to have ischemic colon  · See above The patient is a 60y Female complaining of flank pain.

## 2024-04-08 DIAGNOSIS — I10 BENIGN ESSENTIAL HYPERTENSION: ICD-10-CM

## 2024-04-08 DIAGNOSIS — I10 ESSENTIAL HYPERTENSION: ICD-10-CM

## 2024-04-08 RX ORDER — FUROSEMIDE 40 MG/1
40 TABLET ORAL DAILY
Qty: 90 TABLET | Refills: 0 | Status: SHIPPED | OUTPATIENT
Start: 2024-04-08

## 2024-04-09 ENCOUNTER — OFFICE VISIT (OUTPATIENT)
Dept: FAMILY MEDICINE CLINIC | Facility: CLINIC | Age: 64
End: 2024-04-09
Payer: MEDICARE

## 2024-04-09 VITALS
SYSTOLIC BLOOD PRESSURE: 126 MMHG | TEMPERATURE: 97.5 F | HEIGHT: 64 IN | DIASTOLIC BLOOD PRESSURE: 88 MMHG | OXYGEN SATURATION: 97 % | BODY MASS INDEX: 32.27 KG/M2 | HEART RATE: 64 BPM | WEIGHT: 189 LBS

## 2024-04-09 DIAGNOSIS — M25.562 ACUTE PAIN OF LEFT KNEE: ICD-10-CM

## 2024-04-09 DIAGNOSIS — S83.282D ACUTE LATERAL MENISCUS TEAR OF LEFT KNEE, SUBSEQUENT ENCOUNTER: ICD-10-CM

## 2024-04-09 DIAGNOSIS — I65.22 STENOSIS OF LEFT CAROTID ARTERY: ICD-10-CM

## 2024-04-09 DIAGNOSIS — K43.9 VENTRAL HERNIA WITHOUT OBSTRUCTION OR GANGRENE: Primary | ICD-10-CM

## 2024-04-09 PROBLEM — S83.282A ACUTE LATERAL MENISCUS TEAR OF LEFT KNEE: Status: ACTIVE | Noted: 2024-04-09

## 2024-04-09 PROCEDURE — G2211 COMPLEX E/M VISIT ADD ON: HCPCS | Performed by: FAMILY MEDICINE

## 2024-04-09 PROCEDURE — 99214 OFFICE O/P EST MOD 30 MIN: CPT | Performed by: FAMILY MEDICINE

## 2024-04-09 NOTE — PROGRESS NOTES
Assessment/Plan: X-ray left knee reviewed which showed moderate joint effusion as well as mild osteoarthritis and osteophytes.  Patient go for MRI left knee for probable meniscal tear..  Patient will follow with vascular specialist after CTA for left carotid stenosis.  Patient will follow with general surgery regarding ventral hernias.  Patient will go for CAT scan of the abdomen pelvis given worsening symptoms.       Diagnoses and all orders for this visit:    Ventral hernia without obstruction or gangrene  -     CT abdomen pelvis w contrast; Future    Stenosis of left carotid artery    Acute pain of left knee  -     MRI knee left  wo contrast; Future    Acute lateral meniscus tear of left knee, subsequent encounter            Subjective:        Patient ID: Nancy Jones is a 64 y.o. female.      Patient is here with worsening knee pain on the left.  No significant swelling or bruising noted.  Patient did have trauma originally.  Patient with ongoing abdominal pain.  Pain is on the left.  Pain is daily.  Abdominal hernia is increasing.  Patient with some nausea and vomiting.  Patient will go for CAT scan of the head and neck and then will follow with vascular specialist on the 16th.    Knee Pain     Abdominal Pain  Associated symptoms include arthralgias, nausea and vomiting.         The following portions of the patient's history were reviewed and updated as appropriate: allergies, current medications, past family history, past medical history, past social history, past surgical history and problem list.      Review of Systems   Constitutional: Negative.    HENT: Negative.     Eyes: Negative.    Respiratory: Negative.     Cardiovascular: Negative.    Gastrointestinal:  Positive for abdominal pain, nausea and vomiting.   Endocrine: Negative.    Genitourinary: Negative.    Musculoskeletal:  Positive for arthralgias.   Skin: Negative.    Allergic/Immunologic: Negative.    Neurological: Negative.    Hematological:  "Negative.    Psychiatric/Behavioral: Negative.             Objective:        Depression Screening and Follow-up Plan: Clincally patient does not have depression. No treatment is required.             /88 (BP Location: Left arm, Patient Position: Sitting, Cuff Size: Standard)   Pulse 64   Temp 97.5 °F (36.4 °C) (Temporal)   Ht 5' 4\" (1.626 m)   Wt 85.7 kg (189 lb)   LMP  (LMP Unknown)   SpO2 97%   BMI 32.44 kg/m²          Physical Exam  Vitals and nursing note reviewed.   Constitutional:       General: She is not in acute distress.     Appearance: Normal appearance. She is not ill-appearing, toxic-appearing or diaphoretic.   HENT:      Head: Normocephalic and atraumatic.      Right Ear: Tympanic membrane, ear canal and external ear normal. There is no impacted cerumen.      Left Ear: Tympanic membrane, ear canal and external ear normal. There is no impacted cerumen.      Nose: Nose normal. No congestion or rhinorrhea.   Eyes:      General: No scleral icterus.        Right eye: No discharge.         Left eye: No discharge.   Neck:      Vascular: No carotid bruit.   Cardiovascular:      Rate and Rhythm: Normal rate and regular rhythm.      Pulses: Normal pulses.      Heart sounds: Normal heart sounds. No murmur heard.     No friction rub. No gallop.   Pulmonary:      Effort: Pulmonary effort is normal. No respiratory distress.      Breath sounds: Normal breath sounds. No stridor. No wheezing, rhonchi or rales.   Chest:      Chest wall: No tenderness.   Abdominal:      Hernia: A hernia is present.   Musculoskeletal:         General: Tenderness present. No swelling, deformity or signs of injury.      Cervical back: Normal range of motion and neck supple. No rigidity. No muscular tenderness.      Right lower leg: No edema.      Left lower leg: No edema.      Comments: Left knee positive Apley test.  Positive effusion.  Pain over the lateral joint line.  No pain over management.   Lymphadenopathy:      " Cervical: No cervical adenopathy.   Skin:     General: Skin is warm and dry.      Capillary Refill: Capillary refill takes less than 2 seconds.      Coloration: Skin is not jaundiced.      Findings: No bruising, erythema, lesion or rash.   Neurological:      Mental Status: She is alert and oriented to person, place, and time. Mental status is at baseline.      Cranial Nerves: No cranial nerve deficit.      Sensory: No sensory deficit.      Motor: No weakness.      Coordination: Coordination normal.      Gait: Gait normal.   Psychiatric:         Mood and Affect: Mood normal.         Behavior: Behavior normal.         Thought Content: Thought content normal.         Judgment: Judgment normal.

## 2024-04-10 ENCOUNTER — OFFICE VISIT (OUTPATIENT)
Dept: CARDIOLOGY CLINIC | Facility: CLINIC | Age: 64
End: 2024-04-10
Payer: COMMERCIAL

## 2024-04-10 VITALS
DIASTOLIC BLOOD PRESSURE: 72 MMHG | SYSTOLIC BLOOD PRESSURE: 120 MMHG | WEIGHT: 189 LBS | HEART RATE: 52 BPM | BODY MASS INDEX: 32.27 KG/M2 | HEIGHT: 64 IN

## 2024-04-10 DIAGNOSIS — I48.0 PAROXYSMAL ATRIAL FIBRILLATION (HCC): Primary | ICD-10-CM

## 2024-04-10 PROCEDURE — 99214 OFFICE O/P EST MOD 30 MIN: CPT | Performed by: INTERNAL MEDICINE

## 2024-04-10 PROCEDURE — 93000 ELECTROCARDIOGRAM COMPLETE: CPT | Performed by: INTERNAL MEDICINE

## 2024-04-10 RX ORDER — FEXOFENADINE HCL 60 MG/1
60 TABLET, FILM COATED ORAL DAILY
COMMUNITY

## 2024-04-10 RX ORDER — AMIODARONE HYDROCHLORIDE 100 MG/1
100 TABLET ORAL DAILY
Qty: 90 TABLET | Refills: 3 | Status: SHIPPED | OUTPATIENT
Start: 2024-04-10

## 2024-04-10 NOTE — PROGRESS NOTES
"      Cardiology             Nancy Jones  1960  [id number]              Assessment/Plan:     Paroxysmal atrial fibrillation, diagnosed on Holter monitor 03/2020, with recurrence during episode of GI bleed 7/2023, now on amiodarone suppression, maintained on Eliquis anticoagulation  Hypertension  Multiple drug intolerances and possible allergies  Probable heterozygous familial hypercholesterolemia, on Repatha  Mild to moderate aortic regurgitation  Obesity   Sleep apnea, compliant with CPAP therapy   Lower extremity edema, on furosemide, resolved              Nuclear stress test, echocardiogram and Zio monitor all unremarkable.  Will reduce amiodarone to 100 mg daily now that it is confirmed that she has no atrial fibrillation.  Recent TSH was mildly elevated and she was asked once again to speak with her PCP to see if this needs treatment.  Continue furosemide 40 mg daily.  Euvolemic by examination.  Continue spironolactone for hypertension control  Continue low-dose aspirin, diltiazem, Repatha, metoprolol  Suspect low cardiac risk for hernia surgery and vascular surgery, given recent normal cardiac testing                 Follow-up with me in 6 months.             Interval History:      This is a 64-year-old female diagnosed with paroxysmal atrial fibrillation on Holter monitoring 3/020.  That time echocardiogram had revealed mild-to-moderate aortic regurgitation with normal left ventricular systolic function.  Nuclear stress test June 2020 was unremarkable with normal perfusion.     She has hypertension, and has multiple medications listed as allergies including beta-blockers, although she has been taking metoprolol for quite some time.  Although on her allergy list HCTZ states “throat closing,\" she states that actually cause some ankle swelling along with amlodipine.  Atorvastatin caused joint pain, although it is listed in her allergies also as “throat closing. ”  She seems to have had a true allergic " reaction to hydralazine and valsartan.  She states valsartan caused angioedema, and she does not remember what happened with hydralazine, although states she thinks it may have been ankle swelling.     She was last seen by Dr. Ray 01/28/2021 at which time she was maintained on metoprolol succinate and spironolactone for hypertension.  At 1 point she was asked to take diltiazem as needed for palpitations.  She has been maintained on Eliquis anticoagulation.       She underwent a Zio monitor 03/2021 at [facility](should be scanned into media section, personally reviewed rhythm strips) revealing normal sinus rhythm with 8 runs of SVT, longest lasting 17 beats.  There were 18 patient triggers, most of which correlated with PACs, short atrial runs, and PVCs.  There was no evidence of atrial fibrillation.     Nuclear stress test 06/2020 was unremarkable     In August 2021 she was hospitalized at [facility] for epigastric and left upper quadrant pain, admitted for superior mesenteric artery thrombosis.  She received heparin drip, and underwent SMA stenting with IR on 08/19/2021.  She was initiated on Plavix, and continued on Eliquis.     During her prior visit 10/15/2021 she was initiated on diltiazem which she later stopped due to headache and nausea.  Subsequent nifedipine was tried which caused headache and nausea as well which she stopped on her own.  Verapamil caused increase in palpitations.     She went to [facility] ER due to urinary retention and was reported to be in atrial fibrillation.  ZIO monitor 10/2022 revealed episodes of paroxysmal atrial fibrillation which correlated with her triggered symptoms.  Echocardiogram 12/16/2022 demonstrated normal left ejection fraction of 55% with mild mitral and aortic regurgitation.  Nuclear stress test 1/17/2023 demonstrated no evidence of myocardial ischemia.  She was seen by electrophysiology and was initiated on flecainide which she did not tolerate well due  "to side effects.  She refused ablation, therefore was placed on diltiazem.     On 1/31/2023 she underwent exploration of left carotid artery for CEA, and this was aborted, as the left carotid bifurcation was high and deep in the neck with the hypoglossal nerve overriding the bifurcation.  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    Echocardiogram 2/21/2023 demonstrated left ejection fraction 60% with mild right ventricular dilatation and mild to moderate aortic regurgitation.     During her prior visit 3/20/2023, diltiazem was increased from 120 mg to 180 mg daily in light of recurrent atrial fibrillation with RVR at 111 bpm.  She refused another consultation with electrophysiology.  She was unable to tolerate flecainide due to increased palpitations, therefore was reluctant to try any more medications and refused ablation as well.     She was started on Diamox thereafter by neurology for signs of increased intracranial pressure on MRI.    During Adina's last visit 1/2023 she had complaints of chest discomfort, dyspnea, and palpitations.  Nuclear stress test 1/31/2024 was unremarkable.  Zio monitor 3/6/2024 was within normal limits with symptoms correlating with sinus rhythm only.  Echocardiogram 1/31/2024 demonstrated normal left ventricular systolic function, with ejection fraction 65% with mild aortic regurgitation.    She presents today for follow-up with no new complaints.  She has been working with general surgery and vascular surgery for an incisional hernia that is causing her discomfort, as well as high-grade left carotid artery stenosis.            Vitals:  Vitals:    04/10/24 1054   BP: 120/72   Pulse: (!) 52   Weight: 85.7 kg (189 lb)   Height: 5' 4\" (1.626 m)         Past Medical History:   Diagnosis Date   • A-fib ([facility]) 03/2020   • Allergic    • Anxiety    • Arthritis    • Asthma    • Back pain     " "and muscle pain   • Bruises easily    • Chronic pain disorder     Interstitial pain   • Colon polyp    • Dental bridge present    • Depression    • Eczema    • GERD (gastroesophageal reflux disease)    • Heart murmur    • History of blood clots     per pt \"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\"   • History of pneumonia    • Hives    • Hyperlipidemia    • Hypertension    • Infertility, female    • Interstitial cystitis    • Migraine     sees neurologist   • Motion sickness    • Obesity    • Palpitations    • PONV (postoperative nausea and vomiting)    • Premature atrial contractions 2022   • Psychiatric disorder    • TIA (transient ischemic attack) 2022    per pt --\"had MRI and 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\"   • Urinary incontinence     wears Pads   • Venous insufficiency 2017   • Wears glasses      Social History     Socioeconomic History   • Marital status:      Spouse name: Not on file   • Number of children: 0   • Years of education: Not on file   • Highest education level: Not on file   Occupational History   • Occupation: retired   Tobacco Use   • Smoking status: Former     Current packs/day: 0.00     Average packs/day: 0.5 packs/day for 10.0 years (5.0 ttl pk-yrs)     Types: Cigarettes     Start date:      Quit date: 2019     Years since quittin.2     Passive exposure: Past   • Smokeless tobacco: Never   Vaping Use   • Vaping status: Never Used   Substance and Sexual Activity   • Alcohol use: Not Currently   • Drug use: No   • Sexual activity: Not Currently     Comment: defer   Other Topics Concern   • Not on file   Social History Narrative    Who lives in your home: Alone     What type of home do you live in: Apartment     Age of your home: 1952 built     How long have you been living there: 5 yrs     Type of heat: forced hot air     Type of fuel: electric     What type of jamin is in your bedroom: carpet jamin     Do you have the following in or " near your home:    Air products: Humidifier in the bedroom/kitchen     Pests: none     Pets: none     Are pets allowed in bedroom: N/A     Open fields, wooded areas nearby: No     Basement: [id number]-bomja-no mold     Exposure to second hand smoke: No    Central air     Habits:    Caffeine: Hot tea 1 cup daily- ice tea 1 cup in the afternoon    Chocolate: Occasionally      Social Determinants of Health     Financial Resource Strain: Medium Risk (10/2/2023)    Overall Financial Resource Strain (CARDIA)    • Difficulty of Paying Living Expenses: Somewhat hard   Food Insecurity: No Food Insecurity (2/21/2023)    Hunger Vital Sign    • Worried About Running Out of Food in the Last Year: Never true    • Ran Out of Food in the Last Year: Never true   Transportation Needs: No Transportation Needs (10/2/2023)    PRAPARE - Transportation    • Lack of Transportation (Medical): No    • Lack of Transportation (Non-Medical): No   Physical Activity: Inactive (5/24/2021)    Exercise Vital Sign    • Days of Exercise per Week: 0 days    • Minutes of Exercise per Session: 0 min   Stress: Not on file   Social Connections: Moderately Isolated (5/24/2021)    Social Connection and Isolation Panel [NHANES]    • Frequency of Communication with Friends and Family: More than three times a week    • Frequency of Social Gatherings with Friends and Family: More than three times a week    • Attends [employer] Services: 1 to 4 times per year    • Active Member of Clubs or Organizations: No    • Attends Club or Organization Meetings: Never    • Marital Status: Never    Intimate Partner Violence: Not At Risk (5/24/2021)    Humiliation, Afraid, Rape, and Kick questionnaire    • Fear of Current or Ex-Partner: No    • Emotionally Abused: No    • Physically Abused: No    • Sexually Abused: No   Housing Stability: Low Risk  (7/20/2023)    Housing Stability Vital Sign    • Unable to Pay for Housing in the Last Year: No    • Number of Places  Lived in the Last Year: 1    • Unstable Housing in the Last Year: No      Family History   Problem Relation Age of Onset   • Lung cancer Mother 60   • Brain cancer Mother 60   • Diabetes Father    • Depression Father    • Other Father         septic   • Allergies Sister    • Hashimoto's thyroiditis Sister    • Abdominal aortic aneurysm Sister    • Diabetes Sister    • No Known Problems Sister    • No Known Problems Maternal Grandmother    • No Known Problems Maternal Grandfather    • No Known Problems Paternal Grandmother    • No Known Problems Paternal Grandfather    • Hodgkin's lymphoma Brother    • No Known Problems Brother    • No Known Problems Maternal Aunt    • Diabetes Other    • Breast cancer Neg Hx      Past Surgical History:   Procedure Laterality Date   • COLONOSCOPY     • DILATION AND CURETTAGE OF UTERUS     • EGD     • EXPLORATORY LAPAROTOMY      for infertility   • EXPLORATORY LAPAROTOMY W/ BOWEL RESECTION N/A 7/19/2023    Procedure: LAPAROTOMY EXPLORATORY W/ BOWEL RESECTION;  Surgeon: Crista Recinos MD;  Location: [facility];  Service: General   • HAND SURGERY      Hand excision of tendon cyst   • MD LAPAROSCOPIC APPENDECTOMY N/A 05/03/2022    Procedure: APPENDECTOMY LAPAROSCOPIC;  Surgeon: Vin Fields MD;  Location: [facility];  Service: General   • MD LAPS ABD PRTM&OMENTUM DX W/WO SPEC BR/WA SPX N/A 7/19/2023    Procedure: LAPAROSCOPY DIAGNOSTIC, LYSIS OF ADHESIONS, LAPAROSCOPY TAKE TAKEDOWN OF LEFT COLON, EXPLORATORY LAPAROSCOPY, LYSIS OF ADHESIONS, RESECTION OF TRANSVERSE COLON SPLENIC FLEXURE AND DESCENDING COLON , TAKE DOWN OF SPLENIC FLEXURE, SIGMOIDOSCOPY, MOBILIZATION OF RIGHT COLON, PRIMARY ANASTOMOSIS EEA 29, TAP BLOCK;  Surgeon: Crista Recinos MD;  Location: [facility];  Service: General   • MD TEAEC W/PATCH GRF CAROTID VERTB SUBCLAV NECK INC Left 1/31/2023    Procedure: EXPLORATION OF LEFT CAROTID ARTERY; ABORTED ENDARTERECTOMY ARTERY CAROTID;  Surgeon: Roberto García DO;   Location: [facility];  Service: Vascular   • SUPERIOR MESTENTERIC ARTERY STENT     • WISDOM TOOTH EXTRACTION         Current Outpatient Medications:   •  acetaminophen (TYLENOL) 325 mg tablet, Take 2 tablets (650 mg total) by mouth every 6 (six) hours, Disp: , Rfl: 0  •  amiodarone 100 mg tablet, Take 1 tablet (100 mg total) by mouth daily, Disp: 90 tablet, Rfl: 3  •  aspirin 81 mg chewable tablet, Chew 1 tablet (81 mg total) daily, Disp: , Rfl: 0  •  chlordiazepoxide-clidinium (LIBRAX) 5-2.5 mg per capsule, Take 1 capsule by mouth daily as needed for cramping, Disp: 90 capsule, Rfl: 0  •  Diclofenac Sodium (VOLTAREN) 1 %, Apply 2 g topically 4 (four) times a day, Disp: 100 g, Rfl: 2  •  diltiazem (CARDIZEM CD) 120 mg 24 hr capsule, TAKE ONE CAPSULE BY MOUTH ONCE DAILY, Disp: 30 capsule, Rfl: 3  •  Docusate Sodium (COLACE PO), Take by mouth daily at bedtime, Disp: , Rfl:   •  Eliquis 5 MG, TAKE ONE TABLET BY MOUTH TWICE DAILY, Disp: 180 tablet, Rfl: 3  •  Evolocumab (Repatha SureClick) 140 MG/ML SOAJ, Inject 1 mL (140 mg total) under the skin every 14 (fourteen) days, Disp: 2 mL, Rfl: 11  •  fexofenadine (ALLEGRA) 180 MG tablet, Take 180 mg by mouth daily, Disp: , Rfl:   •  fexofenadine (ALLEGRA) 60 MG tablet, Take 60 mg by mouth daily, Disp: , Rfl:   •  fluticasone-vilanterol (BREO ELLIPTA) 200-25 MCG/INH inhaler, Inhale 1 puff daily Rinse mouth after use., Disp: 3 Inhaler, Rfl: 3  •  furosemide (LASIX) 40 mg tablet, Take 1 tablet (40 mg total) by mouth daily, Disp: 90 tablet, Rfl: 0  •  LORazepam (ATIVAN) 1 mg tablet, Take 1 tablet (1 mg total) by mouth 3 (three) times a day, Disp: 90 tablet, Rfl: 0  •  methocarbamol (ROBAXIN) 500 mg tablet, TAKE ONE TABLET BY MOUTH THREE TIMES DAILY, Disp: 90 tablet, Rfl: 0  •  metoprolol succinate (TOPROL-XL) 100 mg 24 hr tablet, Take 1 tablet (100 mg total) by mouth 2 (two) times a day, Disp: 180 tablet, Rfl: 1  •  omeprazole (PriLOSEC) 40 MG capsule, Take 1 capsule (40 mg  total) by mouth 2 (two) times a day 30 minutes before breakfast and dinner, Disp: 60 capsule, Rfl: 3  •  phenazopyridine (PYRIDIUM) 200 mg tablet, Take 1 tablet (200 mg total) by mouth 2 (two) times a day as needed for bladder spasms, Disp: 30 tablet, Rfl: 0  •  promethazine (PHENERGAN) 25 mg tablet, Take 1 tablet (25 mg total) by mouth every 6 (six) hours as needed for nausea or vomiting, Disp: 60 tablet, Rfl: 5  •  spironolactone (ALDACTONE) 25 mg tablet, TAKE ONE TABLET BY MOUTH TWICE DAILY, Disp: 180 tablet, Rfl: 1  •  traMADol (ULTRAM) 50 mg tablet, Take 2 tablets (100 mg total) by mouth every 8 (eight) hours as needed for moderate pain, Disp: 180 tablet, Rfl: 0  •  triamcinolone (KENALOG) 0.1 % cream, Apply topically 2 (two) times a day, Disp: 15 g, Rfl: 2  •  Atrovent HFA 17 MCG/ACT inhaler, Inhale 2 puffs 4 (four) times a day as needed (Patient not taking: Reported on 4/10/2024), Disp: , Rfl:   •  cetirizine (ZyrTEC) 10 mg tablet, Take 10 mg by mouth daily (Patient not taking: Reported on 4/10/2024), Disp: , Rfl:   •  magnesium Oxide (MAG-OX) 400 mg TABS, Take 1 tablet (400 mg total) by mouth daily at bedtime (Patient not taking: Reported on 4/10/2024), Disp: 90 tablet, Rfl: 3  •  MULTIPLE VITAMIN PO, Take by mouth (Patient not taking: Reported on 4/10/2024), Disp: , Rfl:   •  potassium chloride (Klor-Con) 10 mEq tablet, Take 1 tablet (10 mEq total) by mouth 2 (two) times a day (Patient not taking: Reported on 4/10/2024), Disp: 180 tablet, Rfl: 1    Current Facility-Administered Medications:   •  cyanocobalamin injection 1,000 mcg, 1,000 mcg, Intramuscular, Q30 Days, Coy Ayoub DO, 1,000 mcg at 11/16/23 1151        Review of Systems:  Review of Systems   Constitutional:  Negative for activity change, fever and unexpected weight change.   HENT:  Negative for facial swelling, nosebleeds and voice change.    Respiratory:  Positive for chest tightness and shortness of breath. Negative for wheezing.     Cardiovascular:  Negative for chest pain, palpitations and leg swelling.   Gastrointestinal:  Negative for abdominal distention.   Genitourinary:  Negative for hematuria.   Musculoskeletal:  Negative for arthralgias.   Skin:  Negative for color change, pallor, rash and wound.   Neurological:  Negative for dizziness, seizures and syncope.   Psychiatric/Behavioral:  Negative for agitation.          Physical Exam:  Physical Exam  Vitals reviewed.   Constitutional:       Appearance: She is well-developed.   HENT:      Head: Normocephalic and atraumatic.   Cardiovascular:      Rate and Rhythm: Normal rate and regular rhythm.      Heart sounds: Normal heart sounds.   Pulmonary:      Effort: Pulmonary effort is normal.      Breath sounds: Normal breath sounds.   Abdominal:      Palpations: Abdomen is soft.   Musculoskeletal:         General: Normal range of motion.      Cervical back: Normal range of motion and neck supple.   Skin:     General: Skin is warm and dry.   Neurological:      Mental Status: She is alert and oriented to person, place, and time.   Psychiatric:         Behavior: Behavior normal.         Thought Content: Thought content normal.         Judgment: Judgment normal.         This note was completed in part utilizing [employer] Direct Software.  Grammatical errors, random word insertions, spelling mistakes, and incomplete sentences can be an occasional consequence of this system secondary to software limitations, ambient noise, and hardware issues.  If you have any questions or concerns about the content, text, or information contained within the body of this dictation, please contact the provider for clarification.

## 2024-04-12 DIAGNOSIS — M54.16 LUMBAR RADICULOPATHY: ICD-10-CM

## 2024-04-12 RX ORDER — METHOCARBAMOL 500 MG/1
500 TABLET, FILM COATED ORAL 3 TIMES DAILY
Qty: 90 TABLET | Refills: 0 | Status: SHIPPED | OUTPATIENT
Start: 2024-04-12

## 2024-04-16 ENCOUNTER — HOSPITAL ENCOUNTER (OUTPATIENT)
Dept: CT IMAGING | Facility: HOSPITAL | Age: 64
Discharge: HOME | End: 2024-04-16
Payer: COMMERCIAL

## 2024-04-16 ENCOUNTER — HOSPITAL ENCOUNTER (OUTPATIENT)
Dept: CT IMAGING | Facility: HOSPITAL | Age: 64
Discharge: HOME | End: 2024-04-16
Attending: SURGERY
Payer: COMMERCIAL

## 2024-04-16 DIAGNOSIS — I65.22 STENOSIS OF LEFT CAROTID ARTERY: ICD-10-CM

## 2024-04-16 DIAGNOSIS — K43.9 VENTRAL HERNIA WITHOUT OBSTRUCTION OR GANGRENE: ICD-10-CM

## 2024-04-16 PROCEDURE — 74177 CT ABD & PELVIS W/CONTRAST: CPT

## 2024-04-16 PROCEDURE — 70498 CT ANGIOGRAPHY NECK: CPT

## 2024-04-16 PROCEDURE — 70496 CT ANGIOGRAPHY HEAD: CPT

## 2024-04-16 RX ADMIN — IOHEXOL 100 ML: 350 INJECTION, SOLUTION INTRAVENOUS at 15:46

## 2024-04-18 ENCOUNTER — TELEPHONE (OUTPATIENT)
Age: 64
End: 2024-04-18

## 2024-04-18 ENCOUNTER — OFFICE VISIT (OUTPATIENT)
Dept: VASCULAR SURGERY | Facility: CLINIC | Age: 64
End: 2024-04-18
Payer: COMMERCIAL

## 2024-04-18 ENCOUNTER — TELEPHONE (OUTPATIENT)
Dept: VASCULAR SURGERY | Facility: CLINIC | Age: 64
End: 2024-04-18

## 2024-04-18 ENCOUNTER — RA CDI HCC (OUTPATIENT)
Dept: OTHER | Facility: HOSPITAL | Age: 64
End: 2024-04-18

## 2024-04-18 VITALS
OXYGEN SATURATION: 95 % | HEIGHT: 64 IN | BODY MASS INDEX: 32.27 KG/M2 | SYSTOLIC BLOOD PRESSURE: 116 MMHG | WEIGHT: 189 LBS | HEART RATE: 56 BPM | DIASTOLIC BLOOD PRESSURE: 72 MMHG

## 2024-04-18 DIAGNOSIS — I65.22 STENOSIS OF LEFT CAROTID ARTERY: Primary | ICD-10-CM

## 2024-04-18 PROCEDURE — 99214 OFFICE O/P EST MOD 30 MIN: CPT | Performed by: SURGERY

## 2024-04-18 RX ORDER — CLOPIDOGREL BISULFATE 75 MG/1
75 TABLET ORAL DAILY
Qty: 30 TABLET | Refills: 3 | Status: SHIPPED | OUTPATIENT
Start: 2024-04-18

## 2024-04-18 NOTE — PROGRESS NOTES
Assessment/Plan:    Stenosis of left carotid artery  Hx of aborted L CEA back in Jan 23' due to inability to attain adequate distal ICA exposure to allow procedure to be performed safely.    Adina returns to the office to review most recent CTA of the head and neck.  She does have baseline significant anxiety.  She denies any focal neurologic deficits.  The CTA was personally reviewed.  Official radiology read is pending.  She has expected persistent high-grade/near occlusive left ICA stenosis.  Of note her primary complaint has been chronic abdominal pain to what she attributes to her incisional hernia from prior bowel surgery.    We discussed the potential challenges with transcarotid artery revascularization (carotid stenting) secondary to neck circumference and overall anatomy.  Having said that I do think it is reasonable to proceed with left TCAR at this time.  The procedure, risk, benefits, alternatives, and anticipated postoperative course were discussed in detail.  Patient is already on aspirin.  She will need to hold her Eliquis 48 hours prior to procedure.  I have provided her with a prescription for Plavix which she should take starting 7 days prior to her surgery date.  Patient on repatha.       Diagnoses and all orders for this visit:    Stenosis of left carotid artery  -     clopidogrel (Plavix) 75 mg tablet; Take 1 tablet (75 mg total) by mouth daily          Subjective:      Patient ID: Nancy Jones is a 64 y.o. female.    Patient is here today to review results of a CTA head and neck w/wo contrast done 4/16/2024 and a CV done 2/23/2024. She is s/p an attempted and aborted left CEA done 1/31/ Patient denies any TIA or Stroke symptoms. She is taking Eliquis and ASA 81 mg. She is a former smoker.     Adina returns to the office to review most recent CTA of the head and neck.        The following portions of the patient's history were reviewed and updated as appropriate: allergies, current  "medications, past family history, past medical history, past social history, past surgical history, and problem list.    Review of Systems   Constitutional: Negative.    HENT: Negative.     Eyes: Negative.    Respiratory: Negative.     Cardiovascular: Negative.    Gastrointestinal:  Positive for abdominal pain (LLQ ABD pain).   Endocrine: Negative.    Genitourinary: Negative.    Musculoskeletal: Negative.    Skin: Negative.    Allergic/Immunologic: Negative.    Neurological: Negative.    Hematological: Negative.    Psychiatric/Behavioral: Negative.           Objective:      /72 (BP Location: Left arm, Patient Position: Sitting, Cuff Size: Standard)   Pulse 56   Ht 5' 4\" (1.626 m)   Wt 85.7 kg (189 lb)   LMP  (LMP Unknown)   SpO2 95%   BMI 32.44 kg/m²          Physical Exam  Constitutional:       General: She is not in acute distress.     Appearance: She is well-developed. She is obese. She is not ill-appearing, toxic-appearing or diaphoretic.   HENT:      Head: Normocephalic and atraumatic.      Right Ear: External ear normal.      Left Ear: External ear normal.   Eyes:      General: No scleral icterus.     Conjunctiva/sclera: Conjunctivae normal.   Neck:      Trachea: No tracheal deviation.   Cardiovascular:      Rate and Rhythm: Normal rate and regular rhythm.      Heart sounds: Normal heart sounds.   Pulmonary:      Effort: Pulmonary effort is normal.      Breath sounds: Normal breath sounds.   Abdominal:      Palpations: Abdomen is soft. There is no mass (no appreciable aortic pulsation/aneurysm).      Tenderness: There is no guarding or rebound.      Hernia: A hernia is present.   Musculoskeletal:         General: Normal range of motion.      Cervical back: Normal range of motion and neck supple.   Skin:     General: Skin is warm and dry.   Neurological:      Mental Status: She is alert and oriented to person, place, and time.   Psychiatric:         Mood and Affect: Mood normal.         Behavior: " Behavior normal.         Thought Content: Thought content normal.         Judgment: Judgment normal.             Operative Scheduling Information:    Hospital:  [facility]    Physician:  Ricky and Fellow    Surgery: L TCAR    Urgency:  Standard    Level:  Level 3: Outpatients to be scheduled for elective surgery than can be delayed up to 4 weeks without reasonable expectation of detriment to patient    Case Length:  2 hours    Post-op Bed:  ICU    OR Table:  Discovery    Equipment Needs:  Rep: [employer] rep    Medication Instructions:  Aspirin:   Continue (do not hold)  Plavix:  Continue (do not hold) patient to start taking 7 days priorto scheduled procedure  Eliquis:  Hold for 2 days prior to procedure    Hydration:  No    Contrast Allergy:  no

## 2024-04-18 NOTE — ASSESSMENT & PLAN NOTE
Hx of aborted L CEA back in Jan 23' due to inability to attain adequate distal ICA exposure to allow procedure to be performed safely.    Adina returns to the office to review most recent CTA of the head and neck.  She does have baseline significant anxiety.  She denies any focal neurologic deficits.  The CTA was personally reviewed.  Official radiology read is pending.  She has expected persistent high-grade/near occlusive left ICA stenosis.  Of note her primary complaint has been chronic abdominal pain to what she attributes to her incisional hernia from prior bowel surgery.    We discussed the potential challenges with transcarotid artery revascularization (carotid stenting) secondary to neck circumference and overall anatomy.  Having said that I do think it is reasonable to proceed with left TCAR at this time.  The procedure, risk, benefits, alternatives, and anticipated postoperative course were discussed in detail.  Patient is already on aspirin.  She will need to hold her Eliquis 48 hours prior to procedure.  I have provided her with a prescription for Plavix which she should take starting 7 days prior to her surgery date.  Patient on repatha.

## 2024-04-18 NOTE — TELEPHONE ENCOUNTER
"Patient of . Was last seen in the office on 1/25/24. Calling today wanting to let Doctor know that she is having increased abdominal pain from her multiple hernias that she rates as a 10+. Saw her PCP,  for this and he ordered CT that was  done 4/16/24. Results not read yet. She was hoping to talk to  call when the report is read. She also wanted to her her know that her bowel movement started to become \"more darker than normal\" on Sunday and again today  "

## 2024-04-18 NOTE — TELEPHONE ENCOUNTER
REMINDER: Under Reason For Call, comments MUST be formatted as:   (Surgeon's Initials) / (Procedure)      Special Instructions / FYI:     Consent: I certify that patient has signed, printed, timed, and dated their surgery consent.  I certify that the patient's LEGAL NAME and DATE OF BIRTH are written in the upper left corner on BOTH sides of the consent.  I certify that BOTH sides of the completed surgery consent have been scanned into the patient's Epic chart by myself on 4/18/2024.  Yes, I have LABELED the consent in Epic as Consent for Vascular Procedure.     For Surgical Clearances     Levels   1-3   ROUTE this encounter to [facility] Clearance Pool (AND)   [facility] Surgery Coordinator Pool     Level   4   ROUTE this encounter to [facility] Surgery Coordinator Pool       HYDRATION CLEARANCES   ONLY ROUTE TO  [facility] Clearance Pool       Yes, I have ROUTED this encounter to [facility] Surgery Coordinator and/or [facility] Clearance Pool.

## 2024-04-19 ENCOUNTER — PREP FOR PROCEDURE (OUTPATIENT)
Dept: VASCULAR SURGERY | Facility: CLINIC | Age: 64
End: 2024-04-19

## 2024-04-19 ENCOUNTER — PATIENT MESSAGE (OUTPATIENT)
Dept: FAMILY MEDICINE CLINIC | Facility: CLINIC | Age: 64
End: 2024-04-19

## 2024-04-19 ENCOUNTER — TELEPHONE (OUTPATIENT)
Age: 64
End: 2024-04-19

## 2024-04-19 DIAGNOSIS — F41.9 ANXIETY: Primary | ICD-10-CM

## 2024-04-19 DIAGNOSIS — I65.22 STENOSIS OF LEFT CAROTID ARTERY: Primary | ICD-10-CM

## 2024-04-19 DIAGNOSIS — N30.10 INTERSTITIAL CYSTITIS: ICD-10-CM

## 2024-04-19 DIAGNOSIS — I65.22 ASYMPTOMATIC STENOSIS OF LEFT CAROTID ARTERY: Primary | ICD-10-CM

## 2024-04-19 DIAGNOSIS — R33.9 URINARY RETENTION: Primary | ICD-10-CM

## 2024-04-19 RX ORDER — LORAZEPAM 2 MG/1
2 TABLET ORAL EVERY 8 HOURS PRN
Qty: 21 TABLET | Refills: 0 | Status: SHIPPED | OUTPATIENT
Start: 2024-04-19

## 2024-04-19 RX ORDER — PHENAZOPYRIDINE HYDROCHLORIDE 200 MG/1
200 TABLET, FILM COATED ORAL 2 TIMES DAILY PRN
Qty: 30 TABLET | Refills: 0 | Status: SHIPPED | OUTPATIENT
Start: 2024-04-19

## 2024-04-19 NOTE — TELEPHONE ENCOUNTER
Verified patient's insurance   CONFIRMED - Patient's insurance is [employer] spoke with patient this morning. Advised her to start her Plavix on Sunday. Script went to pharmacy. Last dose of Eliquis is Friday 4/26/24. Patient was mailed discharge instructions and any labs needed for surgery today   Is patient requesting a call when authorization has been obtained? Patient did not request a call.    Surgery Date: 4/29/24  Primary Surgeon: ADRI Bejarano/ Roberto Gillette (NPI: [id number])  Assisting Surgeon: Not Applicable (N/A)  Facility: [facility] (Tax: [id number] / [id number]: [id number])  Inpatient / Outpatient: Inpatient  Level: 3    Clearance Received: No clearance ordered.  Consent Received: Yes, scanned into Epic on 4/18/24.  Medication Hold / Last Dose: Medication Instructions:  Aspirin:   Continue (do not hold)  Plavix:  Continue (do not hold) patient to start taking 7 days priorto scheduled procedure  Eliquis:  Hold for 2 days prior to procedure       IR Notified:  4/19 via email  Rep. Notified:  4/19 via email [employer]   Equipment Needs: Not Applicable (N/A)  Vas Lab Requested: Not Applicable (N/A)  Patient Contacted: 4/19 spoke Delia    Diagnosis: I65.22   Procedure/ CPT Code(s): (GILSON) Carotid Artery Stenting / (PTA) Percutaneous Transluminal Angioplasty // CPT: 03262    For varicose vein related procedures:   Last LEVDR: Not Applicable (N/A)  CEAP Classification: Not Applicable (N/A)  VCSS: Not Applicable (N/A)    Post Operative Date/ Time: 5/13/24 , 330pm [facility] with Fara Byers (NPI: [id number])     *Please review medication hold(s), PATs, and check H&P with patient.*  PATIENT WAS MAILED SURGERY/SHOWERING/DISCHARGE/COVID INSTRUCTIONS AFTER REVIEWING WITH THEM VIA PHONE CALL.

## 2024-04-19 NOTE — TELEPHONE ENCOUNTER
Pt is scheduled for ANGIOPLASTY ARTERY CAROTID W/ STENT (Left: Neck) on 4/29/2024. Pt called stating she wanting to make the surgeon know that she does have a history of interstitial cystitis and has bene on abx in the past. Pt stated she does still have urinary s/s. Pt stated she called her urologist who suggested our office place a urinalysis and a urine culture before her procedure to make sure she is okay to proceed. Advised would send a message to our surgery schedulers to review and advise.

## 2024-04-19 NOTE — TELEPHONE ENCOUNTER
Patient called cancel her appointment 04/29/2024 at 11:00 am. Patient is having a   IR CAROTID STENT PROCEDURE   On 04/29/2024.

## 2024-04-19 NOTE — PATIENT COMMUNICATION
Patient  (Adina) called requesting an update on her previous message.  Due to her medical issues, her anxiety is getting worse, which is causing issues with her AFIB.  She would like a call back by the end of the day, on the medication refill request, if the refills are not approved advice on what she can do to alleviate her anxiety.

## 2024-04-22 ENCOUNTER — PATIENT OUTREACH (OUTPATIENT)
Dept: FAMILY MEDICINE CLINIC | Facility: CLINIC | Age: 64
End: 2024-04-22

## 2024-04-22 ENCOUNTER — TELEPHONE (OUTPATIENT)
Dept: VASCULAR SURGERY | Facility: CLINIC | Age: 64
End: 2024-04-22

## 2024-04-22 ENCOUNTER — APPOINTMENT (OUTPATIENT)
Dept: LAB | Facility: HOSPITAL | Age: 64
DRG: 035 | End: 2024-04-22
Payer: COMMERCIAL

## 2024-04-22 DIAGNOSIS — I65.22 ASYMPTOMATIC STENOSIS OF LEFT CAROTID ARTERY: ICD-10-CM

## 2024-04-22 DIAGNOSIS — R33.9 URINARY RETENTION: ICD-10-CM

## 2024-04-22 LAB
ABO GROUP BLD: NORMAL
ANION GAP SERPL CALCULATED.3IONS-SCNC: 9 MMOL/L (ref 4–13)
BACTERIA UR QL AUTO: NORMAL /HPF
BILIRUB UR QL STRIP: NEGATIVE
BLD GP AB SCN SERPL QL: NEGATIVE
BUN SERPL-MCNC: 25 MG/DL (ref 5–25)
CALCIUM SERPL-MCNC: 10.1 MG/DL (ref 8.4–10.2)
CHLORIDE SERPL-SCNC: 98 MMOL/L (ref 96–108)
CLARITY UR: CLEAR
CO2 SERPL-SCNC: 29 MMOL/L (ref 21–32)
COLOR UR: NORMAL
CREAT SERPL-MCNC: 1.26 MG/DL (ref 0.6–1.3)
ERYTHROCYTE [DISTWIDTH] IN BLOOD BY AUTOMATED COUNT: 12.5 % (ref 11.6–15.1)
GFR SERPL CREATININE-BSD FRML MDRD: 44 ML/MIN/1.73SQ M
GLUCOSE SERPL-MCNC: 127 MG/DL (ref 65–140)
GLUCOSE UR STRIP-MCNC: NEGATIVE MG/DL
HCT VFR BLD AUTO: 43.5 % (ref 34.8–46.1)
HGB BLD-MCNC: 14.4 G/DL (ref 11.5–15.4)
HGB UR QL STRIP.AUTO: NEGATIVE
INR PPP: 1.08 (ref 0.84–1.19)
KETONES UR STRIP-MCNC: NEGATIVE MG/DL
LEUKOCYTE ESTERASE UR QL STRIP: NEGATIVE
MCH RBC QN AUTO: 30.8 PG (ref 26.8–34.3)
MCHC RBC AUTO-ENTMCNC: 33.1 G/DL (ref 31.4–37.4)
MCV RBC AUTO: 93 FL (ref 82–98)
NITRITE UR QL STRIP: NEGATIVE
NON-SQ EPI CELLS URNS QL MICRO: NORMAL /HPF
PH UR STRIP.AUTO: 6.5 [PH]
PLATELET # BLD AUTO: 303 THOUSANDS/UL (ref 149–390)
PMV BLD AUTO: 8.7 FL (ref 8.9–12.7)
POTASSIUM SERPL-SCNC: 4.2 MMOL/L (ref 3.5–5.3)
PROT UR STRIP-MCNC: NEGATIVE MG/DL
PROTHROMBIN TIME: 14.2 SECONDS (ref 11.6–14.5)
RBC # BLD AUTO: 4.68 MILLION/UL (ref 3.81–5.12)
RBC #/AREA URNS AUTO: NORMAL /HPF
RH BLD: NEGATIVE
SODIUM SERPL-SCNC: 136 MMOL/L (ref 135–147)
SP GR UR STRIP.AUTO: 1.01 (ref 1–1.03)
SPECIMEN EXPIRATION DATE: NORMAL
UROBILINOGEN UR STRIP-ACNC: <2 MG/DL
WBC # BLD AUTO: 10.07 THOUSAND/UL (ref 4.31–10.16)
WBC #/AREA URNS AUTO: NORMAL /HPF

## 2024-04-22 PROCEDURE — 86850 RBC ANTIBODY SCREEN: CPT

## 2024-04-22 PROCEDURE — 87086 URINE CULTURE/COLONY COUNT: CPT

## 2024-04-22 PROCEDURE — 80048 BASIC METABOLIC PNL TOTAL CA: CPT

## 2024-04-22 PROCEDURE — 85610 PROTHROMBIN TIME: CPT

## 2024-04-22 PROCEDURE — 85027 COMPLETE CBC AUTOMATED: CPT

## 2024-04-22 PROCEDURE — 86901 BLOOD TYPING SEROLOGIC RH(D): CPT

## 2024-04-22 PROCEDURE — 81001 URINALYSIS AUTO W/SCOPE: CPT

## 2024-04-22 PROCEDURE — 36415 COLL VENOUS BLD VENIPUNCTURE: CPT

## 2024-04-22 PROCEDURE — 86900 BLOOD TYPING SEROLOGIC ABO: CPT

## 2024-04-22 NOTE — PROGRESS NOTES
Contacted patient for f/u.  She is scheduled for L TACR on 4/29.  Her weight has remained stable.  No c/o chest pain, sob or LE edema.  He continues to have occasional nausea, taking phenergan with good relief.  No vomiting noted.  She also continues to have abd bloating, cramping and burning with urination.  She was ordered urinalysis,urine culture along with other pre-op blood work.  Informed her orders are in the chart so she can go to the lab at her convenience.   She states she was feeling very anxious regarding surgery and her ativan was increased which has helped her to be calmer.  She is taking all medications as prescribed.  Follow up appointments scheduled.  Informed her CM will reach out once she is home post surgery.  She was agreeable.

## 2024-04-22 NOTE — TELEPHONE ENCOUNTER
----- Message from Lazaro Arevalo sent at 2024 12:54 PM EDT -----  Regarding: RE: LYFT RIDE  0530 AM 24 SCHEDULED LYFT PICKUP      THANK YOU          ----- Message -----  From: Mariel Mathew  Sent: 2024   8:00 AM EDT  To: [facility] Surgery Coordinator; #  Subject: RE: LYFT RIDE                                    Patients Name: Nancy Jones  : 1960  Phone Number: [phone]  Appointment Date: 24  Appointment time: 8am - patient needs to be there for 6am   Address: [address] [place] [id number]  Drop off Facility/Office Name: [facility]  Drop off  Address: [address] [place] [address]  Cost Center: [id number]  Special notes/directions for   Note for scheduling team       ----- Message -----  From: Ziola Villareal MA  Sent: 2024  12:00 AM EDT  To: [facility] Surgery Coordinator  Subject: LYFT RIDE                                        Please set up Lyft transportation to [facility] for L TCAR 24

## 2024-04-23 ENCOUNTER — RA CDI HCC (OUTPATIENT)
Dept: OTHER | Facility: HOSPITAL | Age: 64
End: 2024-04-23

## 2024-04-23 LAB — BACTERIA UR CULT: NORMAL

## 2024-04-24 ENCOUNTER — HOSPITAL ENCOUNTER (OUTPATIENT)
Facility: MEDICAL CENTER | Age: 64
Discharge: HOME | End: 2024-04-24
Payer: COMMERCIAL

## 2024-04-24 ENCOUNTER — HOSPITAL ENCOUNTER (OUTPATIENT)
Dept: MRI IMAGING | Facility: HOSPITAL | Age: 64
Discharge: HOME | End: 2024-04-24
Payer: COMMERCIAL

## 2024-04-24 DIAGNOSIS — M54.16 LUMBAR RADICULOPATHY: ICD-10-CM

## 2024-04-24 DIAGNOSIS — I48.0 PAROXYSMAL ATRIAL FIBRILLATION (HCC): Primary | ICD-10-CM

## 2024-04-24 DIAGNOSIS — Z90.49 S/P SMALL BOWEL RESECTION: ICD-10-CM

## 2024-04-24 DIAGNOSIS — M25.562 ACUTE PAIN OF LEFT KNEE: ICD-10-CM

## 2024-04-24 DIAGNOSIS — F41.9 ANXIETY: ICD-10-CM

## 2024-04-24 PROCEDURE — 73721 MRI JNT OF LWR EXTRE W/O DYE: CPT

## 2024-04-25 ENCOUNTER — OFFICE VISIT (OUTPATIENT)
Dept: GASTROENTEROLOGY | Facility: CLINIC | Age: 64
End: 2024-04-25
Payer: COMMERCIAL

## 2024-04-25 ENCOUNTER — TELEPHONE (OUTPATIENT)
Age: 64
End: 2024-04-25

## 2024-04-25 ENCOUNTER — ANESTHESIA EVENT (OUTPATIENT)
Dept: PERIOP | Facility: HOSPITAL | Age: 64
DRG: 035 | End: 2024-04-25
Payer: COMMERCIAL

## 2024-04-25 ENCOUNTER — NURSE TRIAGE (OUTPATIENT)
Age: 64
End: 2024-04-25

## 2024-04-25 VITALS
HEIGHT: 64 IN | SYSTOLIC BLOOD PRESSURE: 132 MMHG | WEIGHT: 188 LBS | TEMPERATURE: 98.2 F | OXYGEN SATURATION: 96 % | HEART RATE: 67 BPM | BODY MASS INDEX: 32.1 KG/M2 | DIASTOLIC BLOOD PRESSURE: 84 MMHG

## 2024-04-25 DIAGNOSIS — Z87.19 HISTORY OF ISCHEMIC COLITIS: ICD-10-CM

## 2024-04-25 DIAGNOSIS — R11.0 NAUSEA: Primary | ICD-10-CM

## 2024-04-25 DIAGNOSIS — K27.9 PUD (PEPTIC ULCER DISEASE): ICD-10-CM

## 2024-04-25 DIAGNOSIS — K76.0 FATTY LIVER: ICD-10-CM

## 2024-04-25 DIAGNOSIS — K21.9 GASTROESOPHAGEAL REFLUX DISEASE, UNSPECIFIED WHETHER ESOPHAGITIS PRESENT: ICD-10-CM

## 2024-04-25 DIAGNOSIS — R10.84 GENERALIZED ABDOMINAL PAIN: ICD-10-CM

## 2024-04-25 DIAGNOSIS — K59.09 OTHER CONSTIPATION: ICD-10-CM

## 2024-04-25 PROCEDURE — 99214 OFFICE O/P EST MOD 30 MIN: CPT | Performed by: PHYSICIAN ASSISTANT

## 2024-04-25 RX ORDER — AMIODARONE HYDROCHLORIDE 200 MG/1
200 TABLET ORAL DAILY
Qty: 30 TABLET | Refills: 0 | Status: SHIPPED | OUTPATIENT
Start: 2024-04-25 | End: 2024-04-25

## 2024-04-25 RX ORDER — LORAZEPAM 1 MG/1
1 TABLET ORAL 3 TIMES DAILY
Qty: 90 TABLET | Refills: 0 | Status: SHIPPED | OUTPATIENT
Start: 2024-04-25

## 2024-04-25 RX ORDER — TRAMADOL HYDROCHLORIDE 50 MG/1
100 TABLET ORAL EVERY 8 HOURS PRN
Qty: 180 TABLET | Refills: 0 | Status: SHIPPED | OUTPATIENT
Start: 2024-04-25

## 2024-04-25 RX ORDER — ONDANSETRON 8 MG/1
8 TABLET, ORALLY DISINTEGRATING ORAL EVERY 8 HOURS PRN
Qty: 30 TABLET | Refills: 2 | Status: SHIPPED | OUTPATIENT
Start: 2024-04-25 | End: 2024-05-06 | Stop reason: SDUPTHER

## 2024-04-25 NOTE — TELEPHONE ENCOUNTER
"Pt calling re:concerns of side effects of Pavix which she started 4/21, She is scheduled for Carotid artery stent 4/29/24. Pt has always had SOB but she feels it has worsened, she becomes winded after climbing a few stairs, if walking on flat surface she is ok. She denies chest pain. BP this am at home was 156/82, 132/84 at GI visit today. Per pt BP normally 100-120/70's.  Pt states she has had nose bleed every day since starting Plavix. Pt does not have active nose bleeds but notices when she blows her nose there is blood in the tissue, small amt, clusters of dark red mixed w/ some bright red.      She is also c/o headache since starting plavix, constant \"on the top of my head\", she rates as 3-4 \"just annoying\". She is taking Tylenol and it helps somewhat. She also is taking D-Hist homeopathic medication.     She states she also woke up this morning w/ lump on her head, non tender, dime size, she does not recall trauma, it appears bruised.  She denies bruising elsewhere, no blood in urine or stool.     She also wanted to remind everyone about her hernias, she states one of the hernias now has bowel in it and she is concerned about this.  She states pcp office called her and advised studies were normal but when she reads study it mentions the above. I did advise her to f/u w/ her family doctor re: results.    Advised pt I would route concerns to our triage provider for recommendations and office will get back to her.   "

## 2024-04-25 NOTE — TELEPHONE ENCOUNTER
PA for ondansetron 8 mg tab    Submitted via    []CMM-KEY   []Surescripts-Case ID #  []Faxed to plan   [x]Other website Prompt CAM LEVY ID [id number]  []Phone call Case ID #     Office notes sent, clinical questions answered. Awaiting determination    Turnaround time for your insurance to make a decision on your Prior Authorization can take 7-21 business days.

## 2024-04-25 NOTE — TELEPHONE ENCOUNTER
Patient is calling regarding her CT Scan results, she is concerned and would like to get a call back. Please call patient at [phone]

## 2024-04-25 NOTE — TELEPHONE ENCOUNTER
Reviewed.  As long as patient's blood pressure is not consistently elevated, SBP >150, I am not concerned regarding her blood pressure.  Unfortunately I feel that it is likely related to anxiety that her blood pressure is elevated and does not have to do with the Plavix.  Regarding patient's nosebleeds, given the nature of Plavix and its mechanism of action, nosebleeds are not uncommon.  However so long as patient is only having blood in the tissue when she blows her nose, and not profuse bleeding that does not stop, this is not an indication for her to stop her Plavix.  Furthermore, if patient cannot identify any trauma to her head, I am not sure what to make of the lump that she has.  I would advise that she continue to utilize Tylenol for headache, however should she have any neurological deficits (blurry vision, slurred speech, facial droop, unilateral weakness, dysphagia) she should report to the emergency department.  Please stress the importance of remaining on her Plavix, especially if she has plans for a carotid stent placement on 4/29/2024.

## 2024-04-25 NOTE — PROGRESS NOTES
[facility] - Outpatient Follow-up Note  Nancy Jones 64 y.o. female MRN: [id number]  Encounter: [id number]      Assessment and Plan    1. Ischemic colitis s/p resection   2. Abdominal pain  Had a hospital admission last summer after experiencing hematochezia and was found to have extensive ischemic colitis prompting extended left hemicolectomy and primary anastomosis 7/19/23. Does have known incisional hernias with pain and tenderness for whom she is following with general surgery for. Most recent CT scan 4/16/24 was stable  -Continue with general surgery    3. Mesenteric artery thrombosis   S/p SMA stent at [facility]. Now follows with [facility] for carotid artery stenosis and has a planned carotid stenting next week  -Continue with vascular surgery      4. History of PUD  5. GERD  The patient has acid reflux which is currently well controlled on omeprazole 40mg twice daily although she does continue to have nausea and early satiety. EGD 5/28/19 revealed multiple superficial ulcers in the antrum with negative H pylori testing. Etiology thought to be NSAIDs. Repeat EGD 4/7/22 normal including esophogeal biopsies.   -Avoid NSAIDS   -Continue omeprazole 40mg twice daily  -Continue anti nausea meds, currently on as needed phenergan but wants to try zofran  -Given continued symptoms of nausea and early satiety recommend GES however patient defers for now      6. Fatty liver  Seen on CT scan 11/29/21. U/S elastography with S3 steatosis but no fibrosis. Last CMP 7/18/23 normal aside for a mildly elevated alk phos of 118  -Routine monitoring of his CMP  -Recommend healthy diet and routine exercise     7. IBS-C  The patient has a history of IBS-C but is now having about 3 bowel movements daily  -Call with constipation or diarrhea     Follow up 3 months     ______________________________________________________________________    History of Present Illness  Nancy Jones is a 64 y.o.  "female with HTN, a fib, CAMILLE, CKD3, anxiety, ischemic colitis s/p resection, SMA thrombosis s/p stent, and history of peptic ulcer disease here for follow up evaluation of nausea, GERD, and abdominal pain.  The patient states that her GERD is currently well-controlled on omeprazole 40 mg once daily but she does continue to have bothersome nausea.  She is unable to eat a full meal secondary to early satiety and eating makes her abdominal pain worse.  She states that her pain is located near her hernias.  For her pain she recently underwent CT scan 4/16/2024 with 3 midline ventral abdominal wall incisional hernias.  She is following with general surgery.  They wanted her to lose 15 to 20 pounds prior to operating.  She does take Librax once daily which helps.  Otherwise she has no GI symptoms or complaints.  She does have a history of IBS-C but recently has been having 3 bowel movements daily.      Review of Systems   Constitutional:  Negative for activity change, appetite change, chills, fatigue, fever and unexpected weight change.   Gastrointestinal:  Positive for abdominal pain.       Past Medical History  Past Medical History:   Diagnosis Date    A-fib (HCC) 03/2020    Allergic     Anxiety     Arthritis     Asthma     Back pain     and muscle pain    Bruises easily     Chronic pain disorder     Interstitial pain    Colon polyp     Dental bridge present     Depression     Eczema     GERD (gastroesophageal reflux disease)     Heart murmur     History of blood clots     per pt \"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\"    History of pneumonia     Hives     Hyperlipidemia     Hypertension     Infertility, female     Interstitial cystitis     Migraine     sees neurologist    Motion sickness     Obesity     Palpitations     PONV (postoperative nausea and vomiting)     Premature atrial contractions 11/09/2022    Psychiatric disorder     TIA (transient ischemic attack) 09/2022    per pt --\"had MRI and saw " "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\"    Urinary incontinence     wears Pads    Venous insufficiency 08/01/2017    Wears glasses        Past Social history  Past Surgical History:   Procedure Laterality Date    COLONOSCOPY      DILATION AND CURETTAGE OF UTERUS      EGD      EXPLORATORY LAPAROTOMY      for infertility    EXPLORATORY LAPAROTOMY W/ BOWEL RESECTION N/A 7/19/2023    Procedure: LAPAROTOMY EXPLORATORY W/ BOWEL RESECTION;  Surgeon: Crista Recinos MD;  Location: [facility];  Service: General    HAND SURGERY      Hand excision of tendon cyst    IL LAPAROSCOPIC APPENDECTOMY N/A 05/03/2022    Procedure: APPENDECTOMY LAPAROSCOPIC;  Surgeon: Vin Fields MD;  Location: [facility];  Service: General    IL LAPS ABD PRTM&OMENTUM DX W/WO SPEC BR/WA SPX N/A 7/19/2023    Procedure: LAPAROSCOPY DIAGNOSTIC, LYSIS OF ADHESIONS, LAPAROSCOPY TAKE TAKEDOWN OF LEFT COLON, EXPLORATORY LAPAROSCOPY, LYSIS OF ADHESIONS, RESECTION OF TRANSVERSE COLON SPLENIC FLEXURE AND DESCENDING COLON , TAKE DOWN OF SPLENIC FLEXURE, SIGMOIDOSCOPY, MOBILIZATION OF RIGHT COLON, PRIMARY ANASTOMOSIS EEA 29, TAP BLOCK;  Surgeon: Crista Recinos MD;  Location: [facility];  Service: General    IL TEAEC W/PATCH GRF CAROTID VERTB SUBCLAV NECK INC Left 1/31/2023    Procedure: EXPLORATION OF LEFT CAROTID ARTERY; ABORTED ENDARTERECTOMY ARTERY CAROTID;  Surgeon: Roberto García DO;  Location: [facility];  Service: Vascular    SUPERIOR MESTENTERIC ARTERY STENT      WISDOM TOOTH EXTRACTION       Social History     Socioeconomic History    Marital status:      Spouse name: Not on file    Number of children: 0    Years of education: Not on file    Highest education level: Not on file   Occupational History    Occupation: retired   Tobacco Use    Smoking status: Former     Current packs/day: 0.00     Average packs/day: 0.5 packs/day for 10.0 years (5.0 ttl pk-yrs)     Types: Cigarettes     Start date: 2009     Quit date: 2019     Years since quitting: " 5.3     Passive exposure: Past    Smokeless tobacco: Never   Vaping Use    Vaping status: Never Used   Substance and Sexual Activity    Alcohol use: Not Currently    Drug use: No    Sexual activity: Not Currently     Comment: defer   Other Topics Concern    Not on file   Social History Narrative    Who lives in your home: Alone     What type of home do you live in: Apartment     Age of your home: 1952 built     How long have you been living there: 5 yrs     Type of heat: forced hot air     Type of fuel: electric     What type of jamin is in your bedroom: carpet jamin     Do you have the following in or near your home:    Air products: Humidifier in the bedroom/kitchen     Pests: none     Pets: none     Are pets allowed in bedroom: N/A     Open fields, wooded areas nearby: No     Basement: [id number] mold     Exposure to second hand smoke: No    Central air     Habits:    Caffeine: Hot tea 1 cup daily- ice tea 1 cup in the afternoon    Chocolate: Occasionally      Social Determinants of Health     Financial Resource Strain: Medium Risk (10/2/2023)    Overall Financial Resource Strain (CARDIA)     Difficulty of Paying Living Expenses: Somewhat hard   Food Insecurity: No Food Insecurity (2/21/2023)    Hunger Vital Sign     Worried About Running Out of Food in the Last Year: Never true     Ran Out of Food in the Last Year: Never true   Transportation Needs: No Transportation Needs (10/2/2023)    PRAPARE - Transportation     Lack of Transportation (Medical): No     Lack of Transportation (Non-Medical): No   Physical Activity: Inactive (5/24/2021)    Exercise Vital Sign     Days of Exercise per Week: 0 days     Minutes of Exercise per Session: 0 min   Stress: Not on file   Social Connections: Moderately Isolated (5/24/2021)    Social Connection and Isolation Panel [NHANES]     Frequency of Communication with Friends and Family: More than three times a week     Frequency of Social Gatherings with  Friends and Family: More than three times a week     Attends [employer] Services: 1 to 4 times per year     Active Member of Clubs or Organizations: No     Attends Club or Organization Meetings: Never     Marital Status: Never    Intimate Partner Violence: Not At Risk (2021)    Humiliation, Afraid, Rape, and Kick questionnaire     Fear of Current or Ex-Partner: No     Emotionally Abused: No     Physically Abused: No     Sexually Abused: No   Housing Stability: Low Risk  (2023)    Housing Stability Vital Sign     Unable to Pay for Housing in the Last Year: No     Number of Places Lived in the Last Year: 1     Unstable Housing in the Last Year: No     Social History     Substance and Sexual Activity   Alcohol Use Not Currently     Social History     Substance and Sexual Activity   Drug Use No     Social History     Tobacco Use   Smoking Status Former    Current packs/day: 0.00    Average packs/day: 0.5 packs/day for 10.0 years (5.0 ttl pk-yrs)    Types: Cigarettes    Start date:     Quit date: 2019    Years since quittin.3    Passive exposure: Past   Smokeless Tobacco Never       Past Family History  Family History   Problem Relation Age of Onset    Lung cancer Mother 60    Brain cancer Mother 60    Diabetes Father     Depression Father     Other Father         septic    Allergies Sister     Hashimoto's thyroiditis Sister     Abdominal aortic aneurysm Sister     Diabetes Sister     No Known Problems Sister     No Known Problems Maternal Grandmother     No Known Problems Maternal Grandfather     No Known Problems Paternal Grandmother     No Known Problems Paternal Grandfather     Hodgkin's lymphoma Brother     No Known Problems Brother     No Known Problems Maternal Aunt     Diabetes Other     Breast cancer Neg Hx        Current Medications  Current Outpatient Medications   Medication Sig Dispense Refill    acetaminophen (TYLENOL) 325 mg tablet Take 2 tablets (650 mg total) by mouth every 6  (six) hours  0    amiodarone 100 mg tablet Take 1 tablet (100 mg total) by mouth daily 90 tablet 3    amiodarone 200 mg tablet TAKE ONE TABLET BY MOUTH ONCE DAILY 30 tablet 0    aspirin 81 mg chewable tablet Chew 1 tablet (81 mg total) daily  0    Atrovent HFA 17 MCG/ACT inhaler Inhale 2 puffs 4 (four) times a day as needed (Patient not taking: Reported on 4/10/2024)      cetirizine (ZyrTEC) 10 mg tablet Take 10 mg by mouth daily (Patient not taking: Reported on 4/10/2024)      chlordiazepoxide-clidinium (LIBRAX) 5-2.5 mg per capsule Take 1 capsule by mouth daily as needed for cramping 90 capsule 0    clopidogrel (Plavix) 75 mg tablet Take 1 tablet (75 mg total) by mouth daily 30 tablet 3    Diclofenac Sodium (VOLTAREN) 1 % Apply 2 g topically 4 (four) times a day 100 g 2    diltiazem (CARDIZEM CD) 120 mg 24 hr capsule TAKE ONE CAPSULE BY MOUTH ONCE DAILY 30 capsule 3    Docusate Sodium (COLACE PO) Take by mouth daily at bedtime      Eliquis 5 MG TAKE ONE TABLET BY MOUTH TWICE DAILY 180 tablet 3    Evolocumab (Repatha SureClick) 140 MG/ML SOAJ Inject 1 mL (140 mg total) under the skin every 14 (fourteen) days 2 mL 11    fexofenadine (ALLEGRA) 180 MG tablet Take 180 mg by mouth daily      fexofenadine (ALLEGRA) 60 MG tablet Take 60 mg by mouth daily      fluticasone-vilanterol (BREO ELLIPTA) 200-25 MCG/INH inhaler Inhale 1 puff daily Rinse mouth after use. 3 Inhaler 3    furosemide (LASIX) 40 mg tablet Take 1 tablet (40 mg total) by mouth daily 90 tablet 0    LORazepam (ATIVAN) 1 mg tablet Take 1 tablet (1 mg total) by mouth 3 (three) times a day 90 tablet 0    LORazepam (ATIVAN) 2 mg tablet Take 1 tablet (2 mg total) by mouth every 8 (eight) hours as needed for anxiety 21 tablet 0    magnesium Oxide (MAG-OX) 400 mg TABS Take 1 tablet (400 mg total) by mouth daily at bedtime (Patient not taking: Reported on 4/10/2024) 90 tablet 3    methocarbamol (ROBAXIN) 500 mg tablet Take 1 tablet (500 mg total) by mouth 3 (three)  times a day 90 tablet 0    metoprolol succinate (TOPROL-XL) 100 mg 24 hr tablet Take 1 tablet (100 mg total) by mouth 2 (two) times a day 180 tablet 1    MULTIPLE VITAMIN PO Take by mouth (Patient not taking: Reported on 4/10/2024)      omeprazole (PriLOSEC) 40 MG capsule Take 1 capsule (40 mg total) by mouth 2 (two) times a day 30 minutes before breakfast and dinner 60 capsule 3    phenazopyridine (PYRIDIUM) 200 mg tablet Take 1 tablet (200 mg total) by mouth 2 (two) times a day as needed for bladder spasms 30 tablet 0    potassium chloride (Klor-Con) 10 mEq tablet Take 1 tablet (10 mEq total) by mouth 2 (two) times a day (Patient not taking: Reported on 4/10/2024) 180 tablet 1    promethazine (PHENERGAN) 25 mg tablet Take 1 tablet (25 mg total) by mouth every 6 (six) hours as needed for nausea or vomiting 60 tablet 5    spironolactone (ALDACTONE) 25 mg tablet TAKE ONE TABLET BY MOUTH TWICE DAILY 180 tablet 1    traMADol (ULTRAM) 50 mg tablet Take 2 tablets (100 mg total) by mouth every 8 (eight) hours as needed for moderate pain 180 tablet 0    triamcinolone (KENALOG) 0.1 % cream Apply topically 2 (two) times a day 15 g 2     Current Facility-Administered Medications   Medication Dose Route Frequency Provider Last Rate Last Admin    cyanocobalamin injection 1,000 mcg  1,000 mcg Intramuscular Q30 Days Coy Ayoub DO   1,000 mcg at 11/16/23 1151       Allergies  Allergies   Allergen Reactions    Ace Inhibitors Angioedema and Anaphylaxis     Anaphylaxis    Benicar [Olmesartan] Angioedema     See Allergy note from 9/11/2008.  Swollen ankles/legs    Hydralazine Other (See Comments)     Lip swelling  Flank pain    Other Anaphylaxis and Other (See Comments)     Other reaction(s): angioadema  Other reaction(s): Other (See Comments)  Preservatives- itching throat closes, hi  Other reaction(s): angioadema  E Z Cat - hives throat closes itching,  Preservatives- itching throat closes, hi  Artificial sweeteners    Valsartan  "Angioedema     Lips, face swollen    Sulfa Antibiotics Hives     stuffiness,itching,hives,throat closing--per pt \"sometimes able to take depending on the brand and the filler or possibly preservatives in it\"    Ampicillin Hives     Depends on brand some preservatives can react. Has recently tolerated oral amoxicillin.    Motrin [Ibuprofen] Other (See Comments)     Per pt \" told not to take due to A Fib\"    Wound Dressing Adhesive Other (See Comments)     Per pt Adhesive on EKG leads caused redness         The following portions of the patient's history were reviewed and updated as appropriate: allergies, current medications, past medical history, past social history, past surgical history and problem list.      Vitals  Vitals:    04/25/24 1128   Weight: 85.3 kg (188 lb)   Height: 5' 4\" (1.626 m)       Physical Exam  Constitutional   General appearance: Patient is seated and in no acute distress, well appearing and well nourished.   Head and Face   Head and face: Normal.    Eyes   Conjunctiva and lids: No erythema, swelling or discharge.  Anicteric.  Ears, Nose, Mouth, and Throat   Hearing: Normal.    Neck: Supple, trachea midline.  Pulmonary   Respiratory effort: No increased work of breathing or signs of respiratory distress.    Cardiovascular   Examination of extremities for edema and/or varicosities: Normal.    Musculoskeletal   Gait and station: Normal   Skin   Skin and subcutaneous tissue: Warm, dry, and intact. No visible jaundice, lesions or rashes.  Psychiatric   Judgment and insight: Normal  Recent and remote memory:  Normal  Mood and affect: Normal      Results  No visits with results within 1 Day(s) from this visit.   Latest known visit with results is:   Appointment on 04/22/2024   Component Date Value    Protime 04/22/2024 14.2     INR 04/22/2024 1.08     WBC 04/22/2024 10.07     RBC 04/22/2024 4.68     Hemoglobin 04/22/2024 14.4     Hematocrit 04/22/2024 43.5     MCV 04/22/2024 93     MCH 04/22/2024 " 30.8     MCHC 04/22/2024 33.1     RDW 04/22/2024 12.5     Platelets 04/22/2024 303     MPV 04/22/2024 8.7 (L)     Sodium 04/22/2024 136     Potassium 04/22/2024 4.2     Chloride 04/22/2024 98     CO2 04/22/2024 29     ANION GAP 04/22/2024 9     BUN 04/22/2024 25     Creatinine 04/22/2024 1.26     Glucose 04/22/2024 127     Calcium 04/22/2024 10.1     eGFR 04/22/2024 44     ABO Grouping 04/22/2024 O     Rh Factor 04/22/2024 Negative     Antibody Screen 04/22/2024 Negative     Specimen Expiration Date 04/22/2024 [id number]     Color, UA 04/22/2024 Dark Yellow     Clarity, UA 04/22/2024 Clear     Specific Gravity, UA 04/22/2024 1.009     pH, UA 04/22/2024 6.5     Leukocytes, UA 04/22/2024 Negative     Nitrite, UA 04/22/2024 Negative     Protein, UA 04/22/2024 Negative     Glucose, UA 04/22/2024 Negative     Ketones, UA 04/22/2024 Negative     Urobilinogen, UA 04/22/2024 <2.0     Bilirubin, UA 04/22/2024 Negative     Occult Blood, UA 04/22/2024 Negative     RBC, UA 04/22/2024 1-2     WBC, UA 04/22/2024 None Seen     Epithelial Cells 04/22/2024 None Seen     Bacteria, UA 04/22/2024 Occasional     Urine Culture 04/22/2024 No Growth <1000 cfu/mL        Radiology Results  CT abdomen pelvis w contrast    Result Date: 4/23/2024  Narrative: CT ABDOMEN AND PELVIS WITH IV CONTRAST INDICATION:   Ventral hernia without obstruction or gangrene.  COMPARISON: 12/14/2023 TECHNIQUE:  CT examination of the abdomen and pelvis was performed. Multiplanar 2D reformatted images were created from the source data. This examination, like all CT scans performed in the [facility], was performed utilizing techniques to minimize radiation dose exposure, including the use of iterative reconstruction and automated exposure control. Radiation dose length product (DLP) for this visit: IV Contrast:  iohexol (OMNIPAQUE) 350 MG/ML injection (SINGLE-DOSE) 100 mL - Enteric Contrast:  Enteric contrast was not administered. FINDINGS: ABDOMEN  LOWER CHEST: No clinically significant abnormality in the visualized lower chest. LIVER/BILIARY TREE: Hepatic steatosis. No suspicious mass. Normal hepatic contours. No biliary dilation. GALLBLADDER: No calcified gallstones. No pericholecystic inflammatory change. SPLEEN: Unremarkable. PANCREAS: Unremarkable. ADRENAL GLANDS: Unremarkable. KIDNEYS/URETERS: Unremarkable. No hydronephrosis. STOMACH AND BOWEL: Right hemicolectomy. Partial transverse colectomy. Normal caliber small bowel loops. Moderate colonic stool. APPENDIX: No findings to suggest appendicitis. ABDOMINOPELVIC CAVITY: No ascites. No pneumoperitoneum. No lymphadenopathy. VESSELS: Atherosclerosis without abdominal aortic aneurysm. PELVIS REPRODUCTIVE ORGANS: Unremarkable for patient's age. URINARY BLADDER: Unremarkable. ABDOMINAL WALL/INGUINAL REGIONS: Midline scar from prior laparotomy. Redemonstration of 3 midline ventral abdominal wall incisional hernias. Bowel containing umbilical hernia with fascial defect measuring 7.1 cm image 111 series 302. Additional fat-containing supraumbilical incisional hernia just above the umbilicus with fascial defect measuring 4.6 cm. Fat-containing supraumbilical hernia approximately 10 cm above the umbilicus with fascial defect measuring 4 cm. BONES: No acute fracture or suspicious osseous lesion.     Impression: Redemonstration of 3 midline ventral abdominal wall incisional hernias largest containing bowel at the level of the umbilicus with fascial defect measuring 7.1 cm. 2 additional fat-containing supraumbilical hernias just above the umbilicus and approximately 10 cm above the umbilicus are relatively stable compared to previous study. Right hemicolectomy and partial transverse colectomy. No intra-abdominal or pelvic lymphadenopathy. No hepatic lesions. Mild hepatic steatosis Electronically signed: 04/23/2024 11:07 PM Bharathi Fleming MD    CTA head and neck w wo contrast    Result Date: 4/23/2024  Narrative: CTA  NECK AND BRAIN WITH AND WITHOUT CONTRAST INDICATION: I65.22: Occlusion and stenosis of left carotid artery COMPARISON:   Prior CTA dated February 20, 2023 TECHNIQUE:  Routine CT imaging of the Brain without contrast.  Post contrast imaging was performed after administration of iodinated contrast through the neck and brain. Post contrast axial 0.625 mm images timed to opacify the arterial system. 3D rendering was performed on an independent workstation.   MIP reconstructions performed. Coronal reconstructions were performed of the noncontrast portion of the brain. Radiation dose length product (DLP) for this visit:  1400 mGy-cm .  This examination, like all CT scans performed in the [facility], was performed utilizing techniques to minimize radiation dose exposure, including the use of iterative reconstruction and automated exposure control. IV Contrast:  100 mL of iohexol (OMNIPAQUE) IMAGE QUALITY:   Diagnostic FINDINGS: NONCONTRAST BRAIN PARENCHYMA:  No intracranial mass, mass effect or midline shift. No CT signs of acute infarction.  No acute parenchymal hemorrhage. VENTRICLES AND EXTRA-AXIAL SPACES:  Normal for the patient's age. VISUALIZED ORBITS: Normal visualized orbits. PARANASAL SINUSES: Normal visualized paranasal sinuses. CERVICAL VASCULATURE AORTIC ARCH AND GREAT VESSELS: Atheromatous plaque results in mild subclavian stenosis on the left. RIGHT VERTEBRAL ARTERY CERVICAL SEGMENT:  Normal origin. The vessel is normal in caliber throughout the neck. 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 The vessel is tiny in size not visualized from  the C2 through the C1 level reconstituting likely via retrograde flow at this level. RIGHT EXTRACRANIAL CAROTID SEGMENT: Mild atherosclerotic disease of the distal common carotid artery and  proximal cervical internal carotid artery without significant stenosis compared to the more distal ICA. LEFT EXTRACRANIAL CAROTID SEGMENT: Severe atherosclerotic disease of the bifurcation. There is severe short segment stenosis origin of the left internal carotid artery with metallic clips adjacent from prior endarterectomy. The ICA remains patent. NASCET criteria was used to determine the degree of internal carotid artery diameter stenosis. INTRACRANIAL VASCULATURE INTERNAL CAROTID ARTERIES: Moderate stenosis left ophthalmic segment of the ICA. Bilateral internal carotid arteries are patent to the carotid terminus. ANTERIOR CIRCULATION:  Symmetric A1 segments and anterior cerebral arteries with normal enhancement.  Normal anterior communicating artery. MIDDLE CEREBRAL ARTERY CIRCULATION:  M1 segment and middle cerebral artery branches demonstrate normal enhancement bilaterally. DISTAL VERTEBRAL ARTERIES:  Normal distal vertebral arteries.  Posterior inferior cerebellar artery origins are normal. Normal vertebral basilar junction. BASILAR ARTERY:  Basilar artery is normal in caliber.  Normal superior cerebellar arteries. POSTERIOR CEREBRAL ARTERIES: Both posterior cerebral arteries arises from the basilar tip.  Both arteries demonstrate normal enhancement.   Normal posterior communicating arteries. VENOUS STRUCTURES:  Normal. NON VASCULAR ANATOMY BONY STRUCTURES:  No acute osseous abnormality. SOFT TISSUES OF THE NECK:  Unremarkable. THORACIC INLET:  Normal.     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 The vessel is not clearly identified in its distal V2 and V3 segments reconstituting at the distal V3/V4 segment likely via retrograde flow. Severe focal left ICA stenosis at its origin unchanged from prior study with evidence of prior endarterectomy. Moderate focal left ICA stenosis intracranially at its ophthalmic segment.  Workstation performed: [id number]       Orders  No orders of the defined types were placed in this encounter.

## 2024-04-25 NOTE — TELEPHONE ENCOUNTER
"Reason for Disposition  • Caller has NON-URGENT medicine question about med that PCP or specialist prescribed and triager unable to answer question    Answer Assessment - Initial Assessment Questions  1. NAME of MEDICATION: \"What medicine are you calling about?\"      Plavix  2. QUESTION: \"What is your question?\" (e.g., medication refill, side effect)      Since beginning Plavix 4/21/24 pt c/o nose bleeds, SOB w/ exertion and elevated BP  3. PRESCRIBING HCP: \"Who prescribed it?\" Reason: if prescribed by specialist, call should be referred to that group.      Dr. Gillette  4. SYMPTOMS: \"Do you have any symptoms?\"      Nose bleeds, sob, elevated bp  5. SEVERITY: If symptoms are present, ask \"Are they mild, moderate or severe?\"      Did not have nose bleeds prior to starting Plavix, she did have SOB and HTN but feels this has worsened.  6. PREGNANCY:  \"Is there any chance that you are pregnant?\" \"When was your last menstrual period?\"      N/A    Protocols used: Medication Question Call-ADULT-[place]    "

## 2024-04-25 NOTE — TELEPHONE ENCOUNTER
Additionally, I do not feel that shortness of breath is related to Plavix.  If she were having an allergic reaction to the Plavix she would be having shortness of breath regardless of if she was ambulating up the stairs or on a flat surface.  Again, should patient develop worsening shortness of breath, specifically shortness of breath at rest, I would advise her to be evaluated in the emergency department.

## 2024-04-25 NOTE — TELEPHONE ENCOUNTER
Contacted patient and reviewed recommendations and information from JUD Ceron.  Patient verbalized understanding and will continue Plavix as ordered.

## 2024-04-25 NOTE — TELEPHONE ENCOUNTER
Spoke with patient who was concerned after looking up results online and going on the web about her hernias, Gave her some reoccurrence she is ok until she has her surgery with Vascular and is cleared by them to come see us again to proceed to get the Hernia repaired, Also advised her if she develops unbearable pain and is concerned of a blockage she is to go to the ED. Patient  understands and will contact us with anymore questions,

## 2024-04-25 NOTE — TELEPHONE ENCOUNTER
Regarding: patient called having SOB  ----- Message from Christine Breen sent at 4/25/2024  3:18 PM EDT -----  patient called to speak to a nurse having SOB due to medication

## 2024-04-26 ENCOUNTER — APPOINTMENT (OUTPATIENT)
Dept: PREADMISSION TESTING | Facility: HOSPITAL | Age: 64
DRG: 035 | End: 2024-04-26
Payer: COMMERCIAL

## 2024-04-26 DIAGNOSIS — I10 BENIGN ESSENTIAL HYPERTENSION: ICD-10-CM

## 2024-04-26 NOTE — TELEPHONE ENCOUNTER
Patients GI provider:  NANNETTE Oakley    Number to return call: [phone]    Reason for call: Aarti from [facility] called in stating the insurance has approved Ondansetron 8mg. Please reach out to patient, thank you.    Scheduled procedure/appointment date if applicable: Apt/procedure 07/25/2024

## 2024-04-26 NOTE — PRE-PROCEDURE INSTRUCTIONS
Pre-Surgery Instructions:   Medication Instructions    acetaminophen (TYLENOL) 325 mg tablet Uses PRN- OK to take day of surgery    amiodarone 100 mg tablet Take day of surgery.    aspirin 81 mg chewable tablet Instructions provided by MD    chlordiazepoxide-clidinium (LIBRAX) 5-2.5 mg per capsule Instructions provided by MD    clopidogrel (Plavix) 75 mg tablet Instructions provided by MD    Diclofenac Sodium (VOLTAREN) 1 % Hold day of surgery.    diltiazem (CARDIZEM CD) 120 mg 24 hr capsule Take day of surgery.    Docusate Sodium (COLACE PO) Take night before surgery    Eliquis 5 MG Instructions provided by MD    Evolocumab (Repatha SureClick) 140 MG/ML SOAJ Takes every 2 weeks--due after surgery    fexofenadine (ALLEGRA) 180 MG tablet Hold day of surgery.    fluticasone-vilanterol (BREO ELLIPTA) 200-25 MCG/INH inhaler Take day of surgery.    furosemide (LASIX) 40 mg tablet Hold day of surgery.    LORazepam (ATIVAN) 2 mg tablet Uses PRN- OK to take day of surgery    methocarbamol (ROBAXIN) 500 mg tablet Uses PRN- OK to take day of surgery    metoprolol succinate (TOPROL-XL) 100 mg 24 hr tablet Take day of surgery.    omeprazole (PriLOSEC) 40 MG capsule Take day of surgery.    ondansetron (ZOFRAN-ODT) 8 mg disintegrating tablet Uses PRN- OK to take day of surgery    phenazopyridine (PYRIDIUM) 200 mg tablet Uses PRN- DO NOT take day of surgery    promethazine (PHENERGAN) 25 mg tablet Uses PRN- OK to take day of surgery    spironolactone (ALDACTONE) 25 mg tablet Hold day of surgery.    traMADol (ULTRAM) 50 mg tablet Uses PRN- OK to take day of surgery    triamcinolone (KENALOG) 0.1 % cream Hold day of surgery.      Medication instructions for day surgery reviewed. Please use only a sip of water to take your instructed medications. Avoid all over the counter vitamins, supplements and NSAIDS for one week prior to surgery per anesthesia guidelines. Tylenol is ok to take as needed.     You will receive a call one business  day prior to surgery with an arrival time and hospital directions. If your surgery is scheduled on a Monday, the hospital will be calling you on the Friday prior to your surgery. If you have not heard from anyone by 8pm, please call the hospital supervisor through the hospital  at [phone]. ([place] [phone] or [place] [phone]).    Do not eat or drink anything after midnight the night before your surgery, including candy, mints, lifesavers, or chewing gum. Do not drink alcohol 24hrs before your surgery. Try not to smoke at least 24hrs before your surgery.       Follow the pre surgery showering instructions as listed in the “My Surgical Experience Booklet” or otherwise provided by your surgeon's office. Do not use a blade to shave the surgical area 1 week before surgery. It is okay to use a clean electric clippers up to 24 hours before surgery. Do not apply any lotions, creams, including makeup, cologne, deodorant, or perfumes after showering on the day of your surgery. Do not use dry shampoo, hair spray, hair gel, or any type of hair products.     No contact lenses, eye make-up, or artificial eyelashes. Remove nail polish, including gel polish, and any artificial, gel, or acrylic nails if possible. Remove all jewelry including rings and body piercing jewelry.     Wear causal clothing that is easy to take on and off. Consider your type of surgery.    Keep any valuables, jewelry, piercings at home. Please bring any specially ordered equipment (sling, braces) if indicated.    Arrange for a responsible person to drive you to and from the hospital on the day of your surgery. Please confirm the visitor policy for the day of your procedure when you receive your phone call with an arrival time.     Call the surgeon's office with any new illnesses, exposures, or additional questions prior to surgery.    Please reference your “My Surgical Experience Booklet” for additional information to prepare for  your upcoming surgery.

## 2024-04-27 RX ORDER — SPIRONOLACTONE 25 MG/1
TABLET ORAL
Qty: 180 TABLET | Refills: 1 | Status: SHIPPED | OUTPATIENT
Start: 2024-04-27

## 2024-04-28 NOTE — TELEPHONE ENCOUNTER
PA for ondansetron Approved     Date(s) approved effective 4/26/2024    Case #      Patient advised by          [x] MyChart Message  [] Phone call   []LMOM  []L/M to call office as no active Communication consent on file  []Unable to leave detailed message as VM not approved on Communication consent         Pharmacy advised by    [x]Fax  []Phone call    Approval letter scanned into Media Yes

## 2024-04-29 ENCOUNTER — HOSPITAL ENCOUNTER (INPATIENT)
Facility: HOSPITAL | Age: 64
LOS: 3 days | Discharge: HOME | DRG: 035 | End: 2024-05-02
Attending: SURGERY | Admitting: SURGERY
Payer: COMMERCIAL

## 2024-04-29 ENCOUNTER — APPOINTMENT (OUTPATIENT)
Dept: RADIOLOGY | Facility: HOSPITAL | Age: 64
DRG: 035 | End: 2024-04-29
Attending: SURGERY
Payer: COMMERCIAL

## 2024-04-29 ENCOUNTER — ANESTHESIA (OUTPATIENT)
Dept: PERIOP | Facility: HOSPITAL | Age: 64
DRG: 035 | End: 2024-04-29
Payer: COMMERCIAL

## 2024-04-29 ENCOUNTER — TELEPHONE (OUTPATIENT)
Dept: FAMILY MEDICINE CLINIC | Facility: CLINIC | Age: 64
End: 2024-04-29

## 2024-04-29 ENCOUNTER — PATIENT OUTREACH (OUTPATIENT)
Dept: FAMILY MEDICINE CLINIC | Facility: CLINIC | Age: 64
End: 2024-04-29

## 2024-04-29 DIAGNOSIS — I65.22 LEFT CAROTID ARTERY STENOSIS: ICD-10-CM

## 2024-04-29 DIAGNOSIS — K59.00 CONSTIPATION, UNSPECIFIED CONSTIPATION TYPE: ICD-10-CM

## 2024-04-29 DIAGNOSIS — N18.30 STAGE 3 CHRONIC KIDNEY DISEASE, UNSPECIFIED WHETHER STAGE 3A OR 3B CKD (HCC): Primary | ICD-10-CM

## 2024-04-29 DIAGNOSIS — I11.0 HYPERTENSIVE HEART DISEASE WITH CONGESTIVE HEART FAILURE, UNSPECIFIED HEART FAILURE TYPE (HCC): ICD-10-CM

## 2024-04-29 PROBLEM — I48.91 A-FIB (HCC): Status: ACTIVE | Noted: 2020-03-19

## 2024-04-29 LAB
ALBUMIN SERPL BCP-MCNC: 4 G/DL (ref 3.5–5)
ALP SERPL-CCNC: 60 U/L (ref 34–104)
ALT SERPL W P-5'-P-CCNC: 13 U/L (ref 7–52)
ANION GAP SERPL CALCULATED.3IONS-SCNC: 8 MMOL/L (ref 4–13)
AST SERPL W P-5'-P-CCNC: 14 U/L (ref 13–39)
BASOPHILS # BLD AUTO: 0.04 THOUSANDS/ÂΜL (ref 0–0.1)
BASOPHILS NFR BLD AUTO: 1 % (ref 0–1)
BILIRUB SERPL-MCNC: 0.37 MG/DL (ref 0.2–1)
BUN SERPL-MCNC: 20 MG/DL (ref 5–25)
CA-I BLD-SCNC: 1.08 MMOL/L (ref 1.12–1.32)
CALCIUM SERPL-MCNC: 8.5 MG/DL (ref 8.4–10.2)
CHLORIDE SERPL-SCNC: 103 MMOL/L (ref 96–108)
CO2 SERPL-SCNC: 26 MMOL/L (ref 21–32)
CREAT SERPL-MCNC: 1.09 MG/DL (ref 0.6–1.3)
EOSINOPHIL # BLD AUTO: 0.06 THOUSAND/ÂΜL (ref 0–0.61)
EOSINOPHIL NFR BLD AUTO: 1 % (ref 0–6)
ERYTHROCYTE [DISTWIDTH] IN BLOOD BY AUTOMATED COUNT: 12.5 % (ref 11.6–15.1)
GFR SERPL CREATININE-BSD FRML MDRD: 53 ML/MIN/1.73SQ M
GLUCOSE SERPL-MCNC: 168 MG/DL (ref 65–140)
HCT VFR BLD AUTO: 30.9 % (ref 34.8–46.1)
HGB BLD-MCNC: 10.3 G/DL (ref 11.5–15.4)
IMM GRANULOCYTES # BLD AUTO: 0.05 THOUSAND/UL (ref 0–0.2)
IMM GRANULOCYTES NFR BLD AUTO: 1 % (ref 0–2)
KCT BLD-ACNC: 234 SEC (ref 89–137)
KCT BLD-ACNC: 283 SEC (ref 89–137)
LYMPHOCYTES # BLD AUTO: 1.25 THOUSANDS/ÂΜL (ref 0.6–4.47)
LYMPHOCYTES NFR BLD AUTO: 16 % (ref 14–44)
MAGNESIUM SERPL-MCNC: 1.7 MG/DL (ref 1.9–2.7)
MCH RBC QN AUTO: 30.8 PG (ref 26.8–34.3)
MCHC RBC AUTO-ENTMCNC: 33.3 G/DL (ref 31.4–37.4)
MCV RBC AUTO: 93 FL (ref 82–98)
MONOCYTES # BLD AUTO: 0.33 THOUSAND/ÂΜL (ref 0.17–1.22)
MONOCYTES NFR BLD AUTO: 4 % (ref 4–12)
NEUTROPHILS # BLD AUTO: 5.99 THOUSANDS/ÂΜL (ref 1.85–7.62)
NEUTS SEG NFR BLD AUTO: 77 % (ref 43–75)
NRBC BLD AUTO-RTO: 0 /100 WBCS
PHOSPHATE SERPL-MCNC: 4.1 MG/DL (ref 2.3–4.1)
PLATELET # BLD AUTO: 174 THOUSANDS/UL (ref 149–390)
PLATELET # BLD AUTO: 194 THOUSANDS/UL (ref 149–390)
PMV BLD AUTO: 8.7 FL (ref 8.9–12.7)
PMV BLD AUTO: 8.7 FL (ref 8.9–12.7)
POTASSIUM SERPL-SCNC: 4 MMOL/L (ref 3.5–5.3)
PROT SERPL-MCNC: 6.1 G/DL (ref 6.4–8.4)
RBC # BLD AUTO: 3.34 MILLION/UL (ref 3.81–5.12)
SODIUM SERPL-SCNC: 137 MMOL/L (ref 135–147)
SPECIMEN SOURCE: ABNORMAL
SPECIMEN SOURCE: ABNORMAL
WBC # BLD AUTO: 7.72 THOUSAND/UL (ref 4.31–10.16)

## 2024-04-29 PROCEDURE — 83735 ASSAY OF MAGNESIUM: CPT

## 2024-04-29 PROCEDURE — C1725 CATH, TRANSLUMIN NON-LASER: HCPCS | Performed by: SURGERY

## 2024-04-29 PROCEDURE — 99223 1ST HOSP IP/OBS HIGH 75: CPT | Performed by: INTERNAL MEDICINE

## 2024-04-29 PROCEDURE — 037L3DZ DILATION OF LEFT INTERNAL CAROTID ARTERY WITH INTRALUMINAL DEVICE, PERCUTANEOUS APPROACH: ICD-10-PCS | Performed by: SURGERY

## 2024-04-29 PROCEDURE — NC001 PR NO CHARGE: Performed by: SURGERY

## 2024-04-29 PROCEDURE — 85347 COAGULATION TIME ACTIVATED: CPT

## 2024-04-29 PROCEDURE — X2AJ336 CEREBRAL EMBOLIC FILTRATION, EXTRACORPOREAL FLOW REVERSAL CIRCUIT FROM LEFT COMMON CAROTID ARTERY, PERCUTANEOUS APPROACH, NEW TECHNOLOGY GROUP 6: ICD-10-PCS | Performed by: SURGERY

## 2024-04-29 PROCEDURE — 82330 ASSAY OF CALCIUM: CPT

## 2024-04-29 PROCEDURE — 80053 COMPREHEN METABOLIC PANEL: CPT

## 2024-04-29 PROCEDURE — C1769 GUIDE WIRE: HCPCS | Performed by: SURGERY

## 2024-04-29 PROCEDURE — 85025 COMPLETE CBC W/AUTO DIFF WBC: CPT

## 2024-04-29 PROCEDURE — 85049 AUTOMATED PLATELET COUNT: CPT | Performed by: STUDENT IN AN ORGANIZED HEALTH CARE EDUCATION/TRAINING PROGRAM

## 2024-04-29 PROCEDURE — 37215 TRANSCATH STENT CCA W/EPS: CPT | Performed by: SURGERY

## 2024-04-29 PROCEDURE — C1876 STENT, NON-COA/NON-COV W/DEL: HCPCS | Performed by: SURGERY

## 2024-04-29 PROCEDURE — 76000 FLUOROSCOPY <1 HR PHYS/QHP: CPT

## 2024-04-29 PROCEDURE — 84100 ASSAY OF PHOSPHORUS: CPT

## 2024-04-29 PROCEDURE — C1894 INTRO/SHEATH, NON-LASER: HCPCS | Performed by: SURGERY

## 2024-04-29 DEVICE — 8 MM X 40 MM
Type: IMPLANTABLE DEVICE | Site: CAROTID | Status: FUNCTIONAL
Brand: ENROUTE TRANSCAROTID STENT

## 2024-04-29 RX ORDER — LORAZEPAM 1 MG/1
2 TABLET ORAL EVERY 8 HOURS PRN
Status: DISCONTINUED | OUTPATIENT
Start: 2024-04-29 | End: 2024-05-02 | Stop reason: HOSPADM

## 2024-04-29 RX ORDER — OXYCODONE HYDROCHLORIDE 5 MG/1
5 TABLET ORAL ONCE
Status: COMPLETED | OUTPATIENT
Start: 2024-04-29 | End: 2024-04-29

## 2024-04-29 RX ORDER — HYDROMORPHONE HCL/PF 1 MG/ML
0.5 SYRINGE (ML) INJECTION
Status: DISCONTINUED | OUTPATIENT
Start: 2024-04-29 | End: 2024-04-29

## 2024-04-29 RX ORDER — ASPIRIN 81 MG/1
81 TABLET, CHEWABLE ORAL DAILY
Status: DISCONTINUED | OUTPATIENT
Start: 2024-04-30 | End: 2024-05-01

## 2024-04-29 RX ORDER — HYDRALAZINE HYDROCHLORIDE 20 MG/ML
15 INJECTION INTRAMUSCULAR; INTRAVENOUS
Status: DISCONTINUED | OUTPATIENT
Start: 2024-04-29 | End: 2024-05-02 | Stop reason: HOSPADM

## 2024-04-29 RX ORDER — CALCIUM GLUCONATE 20 MG/ML
2 INJECTION, SOLUTION INTRAVENOUS ONCE
Status: COMPLETED | OUTPATIENT
Start: 2024-04-29 | End: 2024-04-29

## 2024-04-29 RX ORDER — SENNOSIDES 8.6 MG
1 TABLET ORAL DAILY
Status: DISCONTINUED | OUTPATIENT
Start: 2024-04-29 | End: 2024-05-02 | Stop reason: HOSPADM

## 2024-04-29 RX ORDER — LORAZEPAM 1 MG/1
1 TABLET ORAL 3 TIMES DAILY
Status: DISCONTINUED | OUTPATIENT
Start: 2024-04-29 | End: 2024-05-02 | Stop reason: HOSPADM

## 2024-04-29 RX ORDER — IODIXANOL 320 MG/ML
INJECTION, SOLUTION INTRAVASCULAR AS NEEDED
Status: DISCONTINUED | OUTPATIENT
Start: 2024-04-29 | End: 2024-04-29 | Stop reason: HOSPADM

## 2024-04-29 RX ORDER — PANTOPRAZOLE SODIUM 20 MG/1
20 TABLET, DELAYED RELEASE ORAL
Status: DISCONTINUED | OUTPATIENT
Start: 2024-04-30 | End: 2024-05-02 | Stop reason: HOSPADM

## 2024-04-29 RX ORDER — OXYCODONE HYDROCHLORIDE 5 MG/1
5 TABLET ORAL EVERY 6 HOURS PRN
Status: DISCONTINUED | OUTPATIENT
Start: 2024-04-29 | End: 2024-05-02 | Stop reason: HOSPADM

## 2024-04-29 RX ORDER — PROPOFOL 10 MG/ML
INJECTION, EMULSION INTRAVENOUS AS NEEDED
Status: DISCONTINUED | OUTPATIENT
Start: 2024-04-29 | End: 2024-04-29

## 2024-04-29 RX ORDER — ACETAMINOPHEN 325 MG/1
975 TABLET ORAL EVERY 8 HOURS SCHEDULED
Status: DISCONTINUED | OUTPATIENT
Start: 2024-04-29 | End: 2024-05-02 | Stop reason: HOSPADM

## 2024-04-29 RX ORDER — HEPARIN SODIUM 1000 [USP'U]/ML
INJECTION, SOLUTION INTRAVENOUS; SUBCUTANEOUS AS NEEDED
Status: DISCONTINUED | OUTPATIENT
Start: 2024-04-29 | End: 2024-04-29

## 2024-04-29 RX ORDER — LABETALOL HYDROCHLORIDE 5 MG/ML
5 INJECTION, SOLUTION INTRAVENOUS
Status: DISCONTINUED | OUTPATIENT
Start: 2024-04-29 | End: 2024-05-02 | Stop reason: HOSPADM

## 2024-04-29 RX ORDER — SODIUM CHLORIDE, SODIUM LACTATE, POTASSIUM CHLORIDE, CALCIUM CHLORIDE 600; 310; 30; 20 MG/100ML; MG/100ML; MG/100ML; MG/100ML
75 INJECTION, SOLUTION INTRAVENOUS CONTINUOUS
Status: DISCONTINUED | OUTPATIENT
Start: 2024-04-29 | End: 2024-04-30

## 2024-04-29 RX ORDER — PROTAMINE SULFATE 10 MG/ML
INJECTION, SOLUTION INTRAVENOUS AS NEEDED
Status: DISCONTINUED | OUTPATIENT
Start: 2024-04-29 | End: 2024-04-29

## 2024-04-29 RX ORDER — HEPARIN SODIUM 200 [USP'U]/100ML
INJECTION, SOLUTION INTRAVENOUS
Status: COMPLETED | OUTPATIENT
Start: 2024-04-29 | End: 2024-04-29

## 2024-04-29 RX ORDER — FUROSEMIDE 40 MG/1
40 TABLET ORAL DAILY
Status: DISCONTINUED | OUTPATIENT
Start: 2024-04-29 | End: 2024-05-02 | Stop reason: HOSPADM

## 2024-04-29 RX ORDER — FENTANYL CITRATE/PF 50 MCG/ML
25 SYRINGE (ML) INJECTION
Status: DISCONTINUED | OUTPATIENT
Start: 2024-04-29 | End: 2024-04-29

## 2024-04-29 RX ORDER — GLYCOPYRROLATE 0.2 MG/ML
INJECTION INTRAMUSCULAR; INTRAVENOUS AS NEEDED
Status: DISCONTINUED | OUTPATIENT
Start: 2024-04-29 | End: 2024-04-29

## 2024-04-29 RX ORDER — MAGNESIUM SULFATE HEPTAHYDRATE 40 MG/ML
2 INJECTION, SOLUTION INTRAVENOUS ONCE
Status: COMPLETED | OUTPATIENT
Start: 2024-04-29 | End: 2024-04-29

## 2024-04-29 RX ORDER — FENTANYL CITRATE 50 UG/ML
INJECTION, SOLUTION INTRAMUSCULAR; INTRAVENOUS AS NEEDED
Status: DISCONTINUED | OUTPATIENT
Start: 2024-04-29 | End: 2024-04-29

## 2024-04-29 RX ORDER — ONDANSETRON 2 MG/ML
4 INJECTION INTRAMUSCULAR; INTRAVENOUS EVERY 6 HOURS PRN
Status: DISCONTINUED | OUTPATIENT
Start: 2024-04-29 | End: 2024-04-30

## 2024-04-29 RX ORDER — MAGNESIUM HYDROXIDE 1200 MG/15ML
LIQUID ORAL AS NEEDED
Status: DISCONTINUED | OUTPATIENT
Start: 2024-04-29 | End: 2024-04-29 | Stop reason: HOSPADM

## 2024-04-29 RX ORDER — ATROPINE SULFATE 1 MG/ML
INJECTION, SOLUTION INTRAVENOUS AS NEEDED
Status: DISCONTINUED | OUTPATIENT
Start: 2024-04-29 | End: 2024-04-29

## 2024-04-29 RX ORDER — ONDANSETRON 2 MG/ML
4 INJECTION INTRAMUSCULAR; INTRAVENOUS ONCE AS NEEDED
Status: DISCONTINUED | OUTPATIENT
Start: 2024-04-29 | End: 2024-04-29

## 2024-04-29 RX ORDER — METOPROLOL SUCCINATE 100 MG/1
100 TABLET, EXTENDED RELEASE ORAL 2 TIMES DAILY
Status: DISCONTINUED | OUTPATIENT
Start: 2024-04-29 | End: 2024-05-02 | Stop reason: HOSPADM

## 2024-04-29 RX ORDER — HEPARIN SODIUM 5000 [USP'U]/ML
5000 INJECTION, SOLUTION INTRAVENOUS; SUBCUTANEOUS EVERY 8 HOURS SCHEDULED
Status: DISCONTINUED | OUTPATIENT
Start: 2024-04-29 | End: 2024-05-01

## 2024-04-29 RX ORDER — SPIRONOLACTONE 25 MG/1
25 TABLET ORAL 2 TIMES DAILY
Status: DISCONTINUED | OUTPATIENT
Start: 2024-04-29 | End: 2024-05-02 | Stop reason: HOSPADM

## 2024-04-29 RX ORDER — DILTIAZEM HYDROCHLORIDE 120 MG/1
120 CAPSULE, COATED, EXTENDED RELEASE ORAL DAILY
Status: DISCONTINUED | OUTPATIENT
Start: 2024-04-30 | End: 2024-05-02 | Stop reason: HOSPADM

## 2024-04-29 RX ORDER — METHOCARBAMOL 500 MG/1
500 TABLET, FILM COATED ORAL 3 TIMES DAILY
Status: DISCONTINUED | OUTPATIENT
Start: 2024-04-29 | End: 2024-05-02 | Stop reason: HOSPADM

## 2024-04-29 RX ORDER — DIPHENHYDRAMINE HYDROCHLORIDE 50 MG/ML
INJECTION INTRAMUSCULAR; INTRAVENOUS AS NEEDED
Status: DISCONTINUED | OUTPATIENT
Start: 2024-04-29 | End: 2024-04-29

## 2024-04-29 RX ORDER — EPHEDRINE SULFATE 50 MG/ML
INJECTION INTRAVENOUS AS NEEDED
Status: DISCONTINUED | OUTPATIENT
Start: 2024-04-29 | End: 2024-04-29

## 2024-04-29 RX ORDER — SODIUM CHLORIDE, SODIUM LACTATE, POTASSIUM CHLORIDE, CALCIUM CHLORIDE 600; 310; 30; 20 MG/100ML; MG/100ML; MG/100ML; MG/100ML
125 INJECTION, SOLUTION INTRAVENOUS CONTINUOUS
Status: DISCONTINUED | OUTPATIENT
Start: 2024-04-29 | End: 2024-04-29

## 2024-04-29 RX ORDER — CEFAZOLIN SODIUM 1 G/3ML
INJECTION, POWDER, FOR SOLUTION INTRAMUSCULAR; INTRAVENOUS AS NEEDED
Status: DISCONTINUED | OUTPATIENT
Start: 2024-04-29 | End: 2024-04-29

## 2024-04-29 RX ORDER — ONDANSETRON 2 MG/ML
INJECTION INTRAMUSCULAR; INTRAVENOUS AS NEEDED
Status: DISCONTINUED | OUTPATIENT
Start: 2024-04-29 | End: 2024-04-29

## 2024-04-29 RX ORDER — FLUTICASONE FUROATE AND VILANTEROL 200; 25 UG/1; UG/1
1 POWDER RESPIRATORY (INHALATION) DAILY
Status: DISCONTINUED | OUTPATIENT
Start: 2024-04-30 | End: 2024-05-02 | Stop reason: HOSPADM

## 2024-04-29 RX ORDER — ROCURONIUM BROMIDE 10 MG/ML
INJECTION, SOLUTION INTRAVENOUS AS NEEDED
Status: DISCONTINUED | OUTPATIENT
Start: 2024-04-29 | End: 2024-04-29

## 2024-04-29 RX ORDER — LIDOCAINE HYDROCHLORIDE 10 MG/ML
INJECTION, SOLUTION EPIDURAL; INFILTRATION; INTRACAUDAL; PERINEURAL AS NEEDED
Status: DISCONTINUED | OUTPATIENT
Start: 2024-04-29 | End: 2024-04-29

## 2024-04-29 RX ORDER — MIDAZOLAM HYDROCHLORIDE 2 MG/2ML
INJECTION, SOLUTION INTRAMUSCULAR; INTRAVENOUS AS NEEDED
Status: DISCONTINUED | OUTPATIENT
Start: 2024-04-29 | End: 2024-04-29

## 2024-04-29 RX ORDER — CLOPIDOGREL BISULFATE 75 MG/1
75 TABLET ORAL DAILY
Status: DISCONTINUED | OUTPATIENT
Start: 2024-04-30 | End: 2024-05-02 | Stop reason: HOSPADM

## 2024-04-29 RX ORDER — SODIUM CHLORIDE 9 MG/ML
INJECTION, SOLUTION INTRAVENOUS CONTINUOUS PRN
Status: DISCONTINUED | OUTPATIENT
Start: 2024-04-29 | End: 2024-04-29

## 2024-04-29 RX ORDER — AMIODARONE HYDROCHLORIDE 100 MG/1
100 TABLET ORAL DAILY
Status: DISCONTINUED | OUTPATIENT
Start: 2024-04-30 | End: 2024-05-02 | Stop reason: HOSPADM

## 2024-04-29 RX ADMIN — HEPARIN SODIUM 7000 UNITS: 1000 INJECTION INTRAVENOUS; SUBCUTANEOUS at 08:57

## 2024-04-29 RX ADMIN — HEPARIN SODIUM 5000 UNITS: 5000 INJECTION INTRAVENOUS; SUBCUTANEOUS at 21:08

## 2024-04-29 RX ADMIN — PHENYLEPHRINE HYDROCHLORIDE 30 MCG/MIN: 10 INJECTION INTRAVENOUS at 09:03

## 2024-04-29 RX ADMIN — SODIUM CHLORIDE, SODIUM LACTATE, POTASSIUM CHLORIDE, AND CALCIUM CHLORIDE 75 ML/HR: .6; .31; .03; .02 INJECTION, SOLUTION INTRAVENOUS at 19:06

## 2024-04-29 RX ADMIN — SODIUM CHLORIDE: 0.9 INJECTION, SOLUTION INTRAVENOUS at 08:11

## 2024-04-29 RX ADMIN — GLYCOPYRROLATE 0.2 MG: 0.2 INJECTION, SOLUTION INTRAMUSCULAR; INTRAVENOUS at 08:16

## 2024-04-29 RX ADMIN — ACETAMINOPHEN 325MG 975 MG: 325 TABLET ORAL at 21:08

## 2024-04-29 RX ADMIN — LORAZEPAM 1 MG: 1 TABLET ORAL at 21:08

## 2024-04-29 RX ADMIN — ACETAMINOPHEN 325MG 975 MG: 325 TABLET ORAL at 13:15

## 2024-04-29 RX ADMIN — FENTANYL CITRATE 50 MCG: 50 INJECTION INTRAMUSCULAR; INTRAVENOUS at 08:08

## 2024-04-29 RX ADMIN — GLYCOPYRROLATE 0.2 MG: 0.2 INJECTION, SOLUTION INTRAMUSCULAR; INTRAVENOUS at 08:24

## 2024-04-29 RX ADMIN — ROCURONIUM BROMIDE 50 MG: 10 INJECTION, SOLUTION INTRAVENOUS at 08:08

## 2024-04-29 RX ADMIN — ATROPINE SULFATE 0.2 MG: 1 INJECTION, SOLUTION INTRAVENOUS at 08:48

## 2024-04-29 RX ADMIN — OXYCODONE 5 MG: 5 TABLET ORAL at 19:41

## 2024-04-29 RX ADMIN — SODIUM CHLORIDE, SODIUM LACTATE, POTASSIUM CHLORIDE, AND CALCIUM CHLORIDE: .6; .31; .03; .02 INJECTION, SOLUTION INTRAVENOUS at 09:44

## 2024-04-29 RX ADMIN — ATROPINE SULFATE 0.2 MG: 1 INJECTION, SOLUTION INTRAVENOUS at 08:41

## 2024-04-29 RX ADMIN — HEPARIN SODIUM 5000 UNITS: 5000 INJECTION INTRAVENOUS; SUBCUTANEOUS at 13:15

## 2024-04-29 RX ADMIN — ATROPINE SULFATE 0.2 MG: 1 INJECTION, SOLUTION INTRAVENOUS at 08:39

## 2024-04-29 RX ADMIN — MAGNESIUM SULFATE HEPTAHYDRATE 2 G: 40 INJECTION, SOLUTION INTRAVENOUS at 18:30

## 2024-04-29 RX ADMIN — SUGAMMADEX 160 MG: 100 INJECTION, SOLUTION INTRAVENOUS at 10:04

## 2024-04-29 RX ADMIN — PROPOFOL 120 MG: 10 INJECTION, EMULSION INTRAVENOUS at 08:08

## 2024-04-29 RX ADMIN — METHOCARBAMOL 500 MG: 500 TABLET ORAL at 21:08

## 2024-04-29 RX ADMIN — EPHEDRINE SULFATE 5 MG: 50 INJECTION, SOLUTION INTRAVENOUS at 09:02

## 2024-04-29 RX ADMIN — LORAZEPAM 1 MG: 1 TABLET ORAL at 14:14

## 2024-04-29 RX ADMIN — CEFAZOLIN 2000 MG: 1 INJECTION, POWDER, FOR SOLUTION INTRAMUSCULAR; INTRAVENOUS; PARENTERAL at 08:00

## 2024-04-29 RX ADMIN — ATROPINE SULFATE 0.2 MG: 1 INJECTION, SOLUTION INTRAVENOUS at 09:17

## 2024-04-29 RX ADMIN — SODIUM CHLORIDE, SODIUM LACTATE, POTASSIUM CHLORIDE, AND CALCIUM CHLORIDE 75 ML/HR: .6; .31; .03; .02 INJECTION, SOLUTION INTRAVENOUS at 12:33

## 2024-04-29 RX ADMIN — METHOCARBAMOL 500 MG: 500 TABLET ORAL at 14:14

## 2024-04-29 RX ADMIN — ONDANSETRON 4 MG: 2 INJECTION INTRAMUSCULAR; INTRAVENOUS at 08:08

## 2024-04-29 RX ADMIN — EPHEDRINE SULFATE 5 MG: 50 INJECTION, SOLUTION INTRAVENOUS at 08:14

## 2024-04-29 RX ADMIN — ATROPINE SULFATE 0.2 MG: 1 INJECTION, SOLUTION INTRAVENOUS at 08:44

## 2024-04-29 RX ADMIN — SODIUM CHLORIDE, SODIUM LACTATE, POTASSIUM CHLORIDE, AND CALCIUM CHLORIDE 125 ML/HR: .6; .31; .03; .02 INJECTION, SOLUTION INTRAVENOUS at 06:58

## 2024-04-29 RX ADMIN — DIPHENHYDRAMINE HYDROCHLORIDE 25 MG: 50 INJECTION, SOLUTION INTRAMUSCULAR; INTRAVENOUS at 08:20

## 2024-04-29 RX ADMIN — LIDOCAINE HYDROCHLORIDE 60 MG: 10 INJECTION, SOLUTION EPIDURAL; INFILTRATION; INTRACAUDAL; PERINEURAL at 08:08

## 2024-04-29 RX ADMIN — FENTANYL CITRATE 50 MCG: 50 INJECTION INTRAMUSCULAR; INTRAVENOUS at 10:23

## 2024-04-29 RX ADMIN — OXYCODONE 5 MG: 5 TABLET ORAL at 17:17

## 2024-04-29 RX ADMIN — PROTAMINE SULFATE 50 MG: 10 INJECTION, SOLUTION INTRAVENOUS at 09:49

## 2024-04-29 RX ADMIN — MIDAZOLAM 2 MG: 1 INJECTION INTRAMUSCULAR; INTRAVENOUS at 08:00

## 2024-04-29 RX ADMIN — CALCIUM GLUCONATE 2 G: 20 INJECTION, SOLUTION INTRAVENOUS at 18:33

## 2024-04-29 RX ADMIN — FENTANYL CITRATE 25 MCG: 50 INJECTION INTRAMUSCULAR; INTRAVENOUS at 08:49

## 2024-04-29 RX ADMIN — SENNOSIDES 8.6 MG: 8.6 TABLET, FILM COATED ORAL at 13:15

## 2024-04-29 RX ADMIN — FENTANYL CITRATE 25 MCG: 50 INJECTION INTRAMUSCULAR; INTRAVENOUS at 08:52

## 2024-04-29 RX ADMIN — HYDROCORTISONE SODIUM SUCCINATE 100 MG: 100 INJECTION, POWDER, FOR SOLUTION INTRAMUSCULAR; INTRAVENOUS at 08:20

## 2024-04-29 RX ADMIN — EPHEDRINE SULFATE 5 MG: 50 INJECTION, SOLUTION INTRAVENOUS at 08:59

## 2024-04-29 NOTE — ASSESSMENT & PLAN NOTE
Home furosemide, spironolactone, diltiazem and metoprolol.  Required some pressor support for hypotension.    Wean off of [place] as able.  Hold home BP medications for now.

## 2024-04-29 NOTE — ASSESSMENT & PLAN NOTE
Home regimen amiodarone, metoprolol, diltiazem and Eliquis.    Plan:  Continue amiodarone and metoprolol and diltiazem.  Hold Eliquis for now as per vascular surgery.

## 2024-04-29 NOTE — PROGRESS NOTES
Received ADT alert patient was admitted for L TCAR.  Will monitor via chart review during hospitalization and outreach patient upon discharge.

## 2024-04-29 NOTE — ANESTHESIA PREPROCEDURE EVALUATION
Procedure:  ANGIOPLASTY ARTERY CAROTID W/ STENT (Left: Neck)    Relevant Problems   CARDIO   (+) A-fib (HCC)   (+) Angina pectoris (HCC)   (+) Carotid artery stenosis   (+) Chronic migraine w/o aura w/o status migrainosus, not intractable   (+) Essential hypertension   (+) Hypertensive heart disease with congestive heart failure (HCC)   (+) 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HCC)   (+) Migraines   (+) Other chest pain      GI/HEPATIC   (+) Gastroesophageal reflux disease without esophagitis   (+) Hepatic steatosis      /RENAL   (+) Stage 3 chronic kidney disease, unspecified whether stage 3a or 3b CKD (HCC)      NEURO/PSYCH   (+) Anxiety   (+) Anxiety and depression (Resolved)   (+) Chronic migraine w/o aura w/o status migrainosus, not intractable   (+) Migraines   (+) Paresthesia      PULMONARY   (+) COPD (chronic obstructive pulmonary disease) (HCC)   (+) CAMILLE (obstructive sleep apnea)      Eye   (+) AMD (age-related macular degeneration), bilateral      Surgery/Wound/Pain   (+) S/P small bowel resection      Other   (+) Obesity (BMI 30-39.9) (Resolved)        Physical Exam    Airway    Mallampati score: III  TM Distance: >3 FB  Neck ROM: full     Dental   No notable dental hx     Cardiovascular  Rhythm: regular, Rate: normal, Cardiovascular exam normal    Pulmonary  Pulmonary exam normal Breath sounds clear to auscultation    Other Findings  post-pubertal.      Anesthesia Plan  ASA Score- 3     Anesthesia Type- IV sedation with anesthesia with ASA Monitors.         Additional Monitors: arterial line.    Airway Plan:     Comment: Listed as sedation.  Sedation vs GA.       Plan Factors-    Chart reviewed.    Patient summary reviewed.                  Induction-     Postoperative Plan-     Informed Consent- Anesthetic plan and risks discussed with patient.

## 2024-04-29 NOTE — OP NOTE
OPERATIVE REPORT  PATIENT NAME: Nancy Jones    :  1960  MRN: [id number]  Pt Location: [place] [address]    SURGERY DATE: 2024    Surgeons and Role:     * Roberto García, DO - Primary     * Tiffanie Arguelles DPM - Assisting     * Idris Peterson DO - Fellow    Preop Diagnosis:  Stenosis of left carotid artery I65.22    Post-Op Diagnosis Codes:     * Stenosis of left carotid artery [I65.22]    Procedure(s):  Left - ANGIOPLASTY ARTERY CAROTID W/ STENT    Specimen(s):  * No specimens in log *    Estimated Blood Loss:   Minimal    Drains:  * No LDAs found *    Anesthesia Type:   Conscious Sedation     Operative Indications:  Stenosis of left carotid artery I65.22  Adina is a pleasant 64-year-old woman well-known to me who previously underwent attempted left carotid endarterectomy back in 2023.  The procedure was aborted due to inability to get adequate distal ICA exposure and safely complete the procedure.  She has had a high-grade left carotid stenosis by duplex velocity criteria.  After a thorough discussion in the office regarding the options of transcarotid artery revascularization versus transfemoral revascularization it was elected to proceed with left TCAR.  The procedure, risk, benefits, alternatives, and anticipated postop course were discussed in detail.  All questions were answered to her satisfaction.  Patient was agreeable to proceed.  Written consent was obtained.  Patient brought to the hybrid operating room at [facility] for the above-mentioned procedure.    Operative Findings:  High grade focal left ICA stenosis with ulcerated plaque  Predilatation with a 5 x 3 aviator balloon followed by placement of a 8 mm x 40 mm en route stent.  Total flow reversal time 9 minutes.  Patient awoke from anesthesia neurologically intact.    Complications:   None    Procedure and Technique:  The patient was probably identified in the preop holding area.  The patient name, laterality, nature procedure  verified.  The operative site was marked.  Patient was brought to the operating room where she was positioned supine on the OR table.  After adequate duction general anesthesia via endotracheal tube a right radial artery monitoring line was placed by anesthesia.  The ultrasound was used to paolo the carotid bifurcation.  The patient was positioned with a shoulder roll and head turned towards the right to facilitate exposure of the left neck.  The left neck and right groin were prepped using chlorhexidine and draped in the usual sterile fashion.  Patient did receive a cortisone and Benadryl for contrast allergy.  A preoperative dose of antibiotics was administered.  A formal timeout was performed and all were in agreement.  A transverse skin incision was created with a #15 scalpel 1 fingerbreadth above the left clavicle.  The incision was deepened down through the subcutaneous tissue/platysma using electrocautery.  The sternocleidomastoid heads were identified.  Self-retaining retractors were positioned.  The muscles were split and dissection carried down onto the left common carotid artery which was circumferentially dissected free from the surrounding tissue over approximately 3 cm segment.  The proximal common carotid artery was encircled with both an umbilical tape and Vesseloops.  A 5-0 Prolene was next used 2 placed a pursestring suture at the planned common carotid artery access site.  During this time on ultrasound guidance the right common femoral vein was accessed using a micropuncture needle.  A microsheath was inserted.  A [place] guidewire was next advanced into the central veins.  The micro sheath was exchanged out for the 8 [place] venous sheath.  The patient was systemically heparinized.  ACT monitoring was carried out throughout the procedure.  Patient was pretreated with glycopyrrolate as well as atropine due to heart rate in the 50s.  Of note patient is on home beta-blocker.  The common carotid  artery was next accessed in the center of the previously placed pursestring suture using a micropuncture needle.  A microwire was next advanced through the needle.  The needle was removed over the wire.  A microsheath was inserted.  Initial carotid angiography was performed to identify the carotid bifurcation.  The inner dilator and microwire were next reinserted through the sheath.  The wire was used to select the external carotid artery.  The micro sheath was next advanced over the wire into the external carotid artery.  The inner dilator and wire were next removed.  The J guidewire was next advanced through the sheath.  The micro sheath was exchanged out over the wire 48 [place] arterial sheath.  Next, the [place] en route flow reversal system was connected between the arterial and venous sheaths.  Repeat angiography in magnification view was performed.  Next, a 5 x 3 cm aviator balloon mounted on the three-way wire was advanced through the sheath.  The carotid artery was next occluded using the vessel loop.  The functioning of the flow reversal system was checked with flushing of the venous sheath.  The [employer] wire was next used to cross the lesion.  The balloon was advanced over the wire and positioned at the point of maximal stenosis.  Angioplasty was performed to nominal pressure.  The patient remained hemodynamically stable during this portion of the procedure.  The wound was next deflated and removed over the wire.  A 8 mm x 40 mm self-expanding stent was next mounted on the wire and advanced under fluoroscopy.  Once the stent was in adequate positioning the stent was deployed without difficulty.  Interval digital subtraction imaging 2 obliquities demonstrated satisfactory angiographic result with less than 30% residual stenosis.  The wire was removed under fluoroscopy.  The common carotid artery vessel loop was released.  The flow reversal system was detached from the arterial sheath without the drain  into the venous sheath and subsequently removed in its entirety.  Next, the arterial sheath was removed and the previously placed pursestring suture cinched down with good hemostasis.  The venous sheath was removed and hemostasis achieved with manual compression.  Patient then received 50 mg of protamine.  The incision was next copiously irrigated.  Topical fibrillar hemostatic agent was used at the exit site.  Once hemostasis was deemed satisfactory the incision was closed in a multilayer fashion.  Skin glue was applied to the incision site.  The right groin venous access site incision was also reapproximated with skin glue.  All needles, instruments, and sponge counts were reported correct.  The patient was awakened, extubated and transferred to recovery room in stable condition.  Patient awoke moving all extremities.   I was present for the entire procedure.    Patient Disposition:  PACU         SIGNATURE: Roberto García DO  DATE: April 29, 2024  TIME: 10:18 AM      Vascular Quality Initiative - Carotid Artery Stent    Functional Status: Restricted in physically strenuous activity but ambulatory and able to carry out work of a light or sedentary nature.    High Risk Factors For CEA: Anatomic risk Prior ipsilat CEA (previously Aborted)    Refused for Surgery: No    Pre-op Imaging is CT/CTA: Yes      Lesion Calcification: 51-99% circumference    Arch Atherosclerosis: Moderate    Urgency: Elective      ASA: III  Anesthesia:  IV Sedation with Anesthesia    Indication: Asymptomatic stenosis    Skin Prep: Chlorhexidene    Arch Type: Type II    Bovine Arch: no     Approach: Carotid open     Medication Loading: None    Prophylactic Anti-bradyarrhythmic yes    Anticoagulant: Heparin    Antiplatelet IIb/IIIa: no    Bradyarrhythmia Requiring Tx: Yes, medication    Fluoro Time: 7 minutes    Distinct Lesions Treated: 1      If Distinct Lesions Treated is 1, Second Stenosis (Not Treated): No    Lesion Type:  Atherosclerosis  Lesion Side: left    Lesion Location: Bifurcation     ICA Distal Tortuosity: Moderate    Lesion Length: 30mm    Lesion Stenosis:90%    Protection Device Used: Yes, successful      Protection Device Type: Flow reversal     Flow Reversal Type: [address] ENROUTE      Flow Reversal Time: 9 minutes    Pre-dilate Before Protection Device: yes  Angioplasty required in order to cross lesion with protection device. For TCAR procedures answer yes if vessel pre-dilated prior to stent placement, or no if not pre-dilated before stent placement.     Pre-dilate Lesion: yes  Pre-dilate Lesion: Angioplasty required in order to cross lesion for stent placement. Required even if there is a technical failure in stent deployment.     Technical Failure: No       Number of Stents: 1    Post- dilate: No      Neurologic Change: No    Intra-Cranial Completion Angiogram: No    Total Procedure Time:   Event Time In   Procedure Start 0849   Procedure Closing 0958   Procedure Finish 1003

## 2024-04-29 NOTE — CONSULTS
[facility]  Consult  Name: Nancy Jones 64 y.o. female I MRN: [id number]  Unit/Bed#: ICU 04 I Date of Admission: 4/29/2024   Date of Service: 4/29/2024 I Hospital Day: 0    Consults    Assessment/Plan   Stenosis of left carotid artery  Assessment & Plan  -Asymptomatic Left carotid stenosis  -S/P TCAR, left neck and right groin access site.  -A line in place for BP monitoring.    Plan:  Frequent neurovascular checks.  Monitor BP, systolic BP goal of 100-160  Continue Aspirin, Plavix and statin.    Essential hypertension  Assessment & Plan  Continue home regimen furosemide, spironolactone, diltiazem and metoprolol.    A-fib ([facility])  Assessment & Plan  Home regimen amiodarone, metoprolol, diltiazem and Eliquis.    Plan:  Continue amiodarone and metoprolol and diltiazem.  Hold Eliquis for now as per vascular surgery.    COPD (chronic obstructive pulmonary disease) ([facility])  Assessment & Plan  Currently not in exacerbation.      Plan:   Continue home regimen Breo 1 puff daily, albuterol PRN      CAMILLE (obstructive sleep apnea)  Assessment & Plan  Is non compliant with CPAP at home, last use was about a year ago.  Is open to trailing CPAP while in  hospital.    Plan:   CPAP QHS    Gastroesophageal reflux disease without esophagitis  Assessment & Plan  Home regimen omeprazole.    Plan:  Protonix as omeprazole is not in hospital formulary.         History of Present Illness     HPI: Nancy Jones is a 64 y.o. F with PMH of HTN, A. Fib on Eliquis, CKD3, ischemic colitis s/p resection of transverse and descending colon, SMA thrombosis S/p sent who had asymptomatic high grade left carotid artery stenosis. She presented to the hospital for elective left TCAR. Patient has left neck and right groin access, she tolerated the procedure well, no neuro deficits noted post procedure. Patient is transferred to ICU for frequent neurovascular monitoring.    Review of Systems   Constitutional:  Negative for  "activity change, chills and fever.   Respiratory:  Negative for chest tightness and shortness of breath.    Cardiovascular:  Negative for palpitations and leg swelling.   Gastrointestinal:  Negative for abdominal pain, constipation, diarrhea, nausea and vomiting.   Genitourinary:  Negative for dysuria.   Neurological:  Negative for headaches.     Disposition: Critical care   Historical Information   Past Medical History:  03/2020: A-fib (HCC)  No date: Allergic  No date: Anxiety  No date: Arthritis  No date: Asthma  No date: Back pain      Comment:  and muscle pain  No date: Bruises easily  No date: Chronic pain disorder      Comment:  Interstitial pain  No date: Colon polyp  No date: Dental bridge present  No date: Depression  No date: Eczema  No date: GERD (gastroesophageal reflux disease)  No date: Heart murmur  No date: History of blood clots      Comment:  per pt \"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 and has               a stent\"  No date: History of pneumonia  No date: Hives  No date: Hyperlipidemia  No date: Hypertension  No date: Infertility, female  No date: Interstitial cystitis  No date: Migraine      Comment:  sees neurologist  No date: Motion sickness  No date: Obesity  No date: Palpitations  No date: PONV (postoperative nausea and vomiting)  11/09/2022: Premature atrial contractions  No date: Psychiatric disorder  09/2022: TIA (transient ischemic attack)      Comment:  per pt --\"had MRI and saw Carotid artery Left was                blocked\"  No date: Urinary incontinence      Comment:  wears Pads  08/01/2017: Venous insufficiency  No date: Wears glasses Past Surgical History:  No date: COLONOSCOPY  No date: DILATION AND CURETTAGE OF UTERUS  No date: EGD  No date: EXPLORATORY LAPAROTOMY      Comment:  for infertility  7/19/2023: EXPLORATORY LAPAROTOMY W/ BOWEL RESECTION; N/A      Comment:  Procedure: LAPAROTOMY EXPLORATORY W/ BOWEL RESECTION;                 Surgeon: Crista Recinos MD;  " Location: [facility];                 Service: General  No date: HAND SURGERY      Comment:  Hand excision of tendon cyst  05/03/2022: MS LAPAROSCOPIC APPENDECTOMY; N/A      Comment:  Procedure: APPENDECTOMY LAPAROSCOPIC;  Surgeon: Vin Fields MD;  Location: [facility];  Service: General  7/19/2023: MS LAPS ABD PRTM&OMENTUM DX W/WO SPEC BR/WA SPX; N/A      Comment:  Procedure: LAPAROSCOPY DIAGNOSTIC, LYSIS OF ADHESIONS,                LAPAROSCOPY TAKE TAKEDOWN OF LEFT COLON, EXPLORATORY                LAPAROSCOPY, LYSIS OF ADHESIONS, RESECTION OF TRANSVERSE                COLON SPLENIC FLEXURE AND DESCENDING COLON , TAKE DOWN OF               SPLENIC FLEXURE, SIGMOIDOSCOPY, MOBILIZATION OF RIGHT                COLON, PRIMARY ANASTOMOSIS EEA 29, TAP BLOCK;  Surgeon:                Crista Recinos MD;  Location: [facility];  Service:                General  1/31/2023: MS TEAEC W/PATCH GRF CAROTID VERTB SUBCLAV NECK INC; Left      Comment:  Procedure: EXPLORATION OF LEFT CAROTID ARTERY; ABORTED                ENDARTERECTOMY ARTERY CAROTID;  Surgeon: Roberto García DO;  Location: [facility];  Service: Vascular  No date: SUPERIOR MESTENTERIC ARTERY STENT  No date: WISDOM TOOTH EXTRACTION   Current Outpatient Medications   Medication Instructions    acetaminophen (TYLENOL) 650 mg, Oral, Every 6 hours scheduled    amiodarone 100 mg, Oral, Daily    aspirin 81 mg, Oral, Daily    chlordiazepoxide-clidinium (LIBRAX) 5-2.5 mg per capsule 1 capsule, Oral, Daily PRN    clopidogrel (PLAVIX) 75 mg, Oral, Daily    Diclofenac Sodium (VOLTAREN) 2 g, Topical, 4 times daily    diltiazem (CARDIZEM CD) 120 mg, Oral, Daily    Docusate Sodium (COLACE PO) Oral, Daily at bedtime    Eliquis 5 MG TAKE ONE TABLET BY MOUTH TWICE DAILY    fexofenadine (ALLEGRA) 180 mg, Oral, Daily    fluticasone-vilanterol (BREO ELLIPTA) 200-25 MCG/INH inhaler 1 puff, Inhalation, Daily, Rinse mouth after use.    furosemide  "(LASIX) 40 mg, Oral, Daily    LORazepam (ATIVAN) 2 mg, Oral, Every 8 hours PRN    LORazepam (ATIVAN) 1 mg, Oral, 3 times daily    magnesium Oxide (MAG-OX) 400 mg, Oral, Daily at bedtime    methocarbamol (ROBAXIN) 500 mg, Oral, 3 times daily    metoprolol succinate (TOPROL-XL) 100 mg, Oral, 2 times daily    omeprazole (PRILOSEC) 40 mg, Oral, 2 times daily, 30 minutes before breakfast and dinner    ondansetron (ZOFRAN-ODT) 8 mg, Oral, Every 8 hours PRN    phenazopyridine (PYRIDIUM) 200 mg, Oral, 2 times daily PRN    promethazine (PHENERGAN) 25 mg, Oral, Every 6 hours PRN    Repatha SureClick 140 mg, Subcutaneous, Every 14 days    spironolactone (ALDACTONE) 25 mg tablet TAKE ONE TABLET BY MOUTH TWICE DAILY    traMADol (ULTRAM) 100 mg, Oral, Every 8 hours PRN    triamcinolone (KENALOG) 0.1 % cream Topical, 2 times daily    Allergies   Allergen Reactions    Ace Inhibitors Angioedema and Anaphylaxis     Anaphylaxis    Benicar [Olmesartan] Angioedema     See Allergy note from 9/11/2008.  Swollen ankles/legs    Hydralazine Other (See Comments)     Lip swelling  Flank pain    Other Anaphylaxis and Other (See Comments)     Preservatives- itching throat closes, hi  E Z Cat scan contrast - hives throat closes itching,  Preservatives- itching throat closes, hi  Artificial sweeteners    Valsartan Angioedema     Lips, face swollen    Ampicillin Hives     Depends on brand some preservatives can react. Has recently tolerated oral amoxicillin.    Sulfa Antibiotics Hives     stuffiness,itching,hives,throat closing--per pt \"sometimes able to take depending on the brand and the filler or possibly preservatives in it\"    Motrin [Ibuprofen] Other (See Comments)     Per pt \" told not to take due to A Fib\"    Wound Dressing Adhesive Other (See Comments)     Per pt Adhesive on EKG leads caused redness      Social History     Tobacco Use    Smoking status: Former     Current packs/day: 0.00     Average packs/day: 0.5 packs/day for 10.0 years " (5.0 ttl pk-yrs)     Types: Cigarettes     Start date:      Quit date: 2019     Years since quittin.3     Passive exposure: Past    Smokeless tobacco: Never   Vaping Use    Vaping status: Never Used   Substance Use Topics    Alcohol use: Not Currently    Drug use: No    Family History   Problem Relation Age of Onset    Lung cancer Mother 60    Brain cancer Mother 60    Diabetes Father     Depression Father     Other Father         septic    Allergies Sister     Hashimoto's thyroiditis Sister     Abdominal aortic aneurysm Sister     Diabetes Sister     No Known Problems Sister     No Known Problems Maternal Grandmother     No Known Problems Maternal Grandfather     No Known Problems Paternal Grandmother     No Known Problems Paternal Grandfather     Hodgkin's lymphoma Brother     No Known Problems Brother     No Known Problems Maternal Aunt     Diabetes Other     Breast cancer Neg Hx         Objective                            Vitals I/O      Most Recent Min/Max in 24hrs   Temp 97.8 °F (36.6 °C) Temp  Min: 96.7 °F (35.9 °C)  Max: 97.8 °F (36.6 °C)   Pulse 56 Pulse  Min: 56  Max: 62   Resp 12 Resp  Min: 9  Max: 24   /60 BP  Min: 109/54  Max: 147/69   O2 Sat 94 % SpO2  Min: 87 %  Max: 99 %      Intake/Output Summary (Last 24 hours) at 2024 1559  Last data filed at 2024 1400  Gross per 24 hour   Intake 2728.75 ml   Output --   Net 2728.75 ml       Diet Cardiovascular; Cardiac    Invasive Monitoring   Arterial Line  [place] /44  Arterial Line BP  Min: 98/44  Max: 149/63   MAP (!) 64 mmHg  Arterial Line MAP (mmHg)  Min: 64 mmHg  Max: 98 mmHg           Physical Exam   Physical Exam  Vitals reviewed.   Eyes:      Extraocular Movements: Extraocular movements intact.      Conjunctiva/sclera: Conjunctivae normal.   Skin:     General: Skin is warm.      Comments: Minimal bruising left neck incision site, clean, intact, no discharge noted.  Minimal bruising in right inguinal incision site, clean,  dry, intact, no discharge noted.   HENT:      Head: Normocephalic.      Mouth/Throat:      Mouth: Mucous membranes are moist.      Pharynx: No oropharyngeal exudate.   Cardiovascular:      Rate and Rhythm: Normal rate and regular rhythm.   Abdominal: General: Bowel sounds are normal.      Palpations: Abdomen is soft.      Tenderness: There is no abdominal tenderness.   Constitutional:       General: She is not in acute distress.     Appearance: She is not ill-appearing.   Pulmonary:      Effort: Pulmonary effort is normal. No respiratory distress.      Breath sounds: Normal breath sounds. No stridor.   Neurological:      General: No focal deficit present.      Mental Status: She is alert and oriented to person, place and time. Mental status is at baseline.            Diagnostic Studies      EKG:No acute events on telemetry 60  Imaging: No imaging obtained I have personally reviewed pertinent reports.       Medications:  Scheduled PRN   acetaminophen, 975 mg, Q8H BONIFACIO  [START ON 4/30/2024] amiodarone, 100 mg, Daily  [START ON 4/30/2024] aspirin, 81 mg, Daily  [START ON 4/30/2024] clopidogrel, 75 mg, Daily  [START ON 4/30/2024] diltiazem, 120 mg, Daily  [START ON 4/30/2024] fluticasone-vilanterol, 1 puff, Daily  furosemide, 40 mg, Daily  heparin (porcine), 5,000 Units, Q8H BONIFACIO  LORazepam, 1 mg, TID  methocarbamol, 500 mg, TID  metoprolol succinate, 100 mg, BID  [START ON 4/30/2024] pantoprazole, 20 mg, Daily Before Breakfast  senna, 1 tablet, Daily  spironolactone, 25 mg, BID      labetalol, 5 mg, Q15 Min PRN   And  hydrALAZINE, 15 mg, Q15 Min PRN   And  niCARdipine, 1-15 mg/hr, Continuous PRN  LORazepam, 2 mg, Q8H PRN  ondansetron, 4 mg, Q6H PRN  oxyCODONE, 5 mg, Q6H PRN  oxyCODONE, 2.5 mg, Q6H PRN  sodium chloride, 500 mL, Once PRN   Followed by  [START ON 4/30/2024] phenylephine,  mcg/min, Continuous PRN       Continuous    lactated ringers, 75 mL/hr, Last Rate: 75 mL/hr (04/29/24 1233)  niCARdipine, 1-15  mg/hr  [START ON 4/30/2024] phenylephine,  mcg/min         Labs:    CBC    Recent Labs     04/29/24  1237        BMP    No recent results    Coags    No recent results     Additional Electrolytes  No recent results       Blood Gas    No recent results  No recent results LFTs  No recent results    Infectious  No recent results  Glucose  No recent results          Eder Hairston MD

## 2024-04-29 NOTE — TELEPHONE ENCOUNTER
----- Message from Coy Ayoub DO sent at 4/28/2024  8:58 PM EDT -----  MRI left knee shows complex horizontal tear through the lateral meniscus.  Patient also with mild grade 1 MCL sprain.  Patient to see orthopedics if not already doing so

## 2024-04-29 NOTE — ASSESSMENT & PLAN NOTE
-Asymptomatic Left carotid stenosis  -S/P TCAR, left neck and right groin access site.  -A line in place for BP monitoring.    Plan:  Frequent neurovascular checks.  Monitor BP, systolic BP goal of 100-160  Continue Aspirin, Plavix and statin.

## 2024-04-29 NOTE — H&P
"H & P    Plan:  L TCAR  Operative site marked  No new changes  /54   Pulse 58   Temp 97.7 °F (36.5 °C) (Temporal)   Resp 16   Ht 5' 4\" (1.626 m)   Wt 87.4 kg (192 lb 10.9 oz)   LMP  (LMP Unknown)   SpO2 93%   BMI 33.07 kg/m²           Assessment/Plan:     Stenosis of left carotid artery  Hx of aborted L CEA back in Jan 23' due to inability to attain adequate distal ICA exposure to allow procedure to be performed safely.     Adina returns to the office to review most recent CTA of the head and neck.  She does have baseline significant anxiety.  She denies any focal neurologic deficits.  The CTA was personally reviewed.  Official radiology read is pending.  She has expected persistent high-grade/near occlusive left ICA stenosis.  Of note her primary complaint has been chronic abdominal pain to what she attributes to her incisional hernia from prior bowel surgery.     We discussed the potential challenges with transcarotid artery revascularization (carotid stenting) secondary to neck circumference and overall anatomy.  Having said that I do think it is reasonable to proceed with left TCAR at this time.  The procedure, risk, benefits, alternatives, and anticipated postoperative course were discussed in detail.  Patient is already on aspirin.  She will need to hold her Eliquis 48 hours prior to procedure.  I have provided her with a prescription for Plavix which she should take starting 7 days prior to her surgery date.  Patient on repatha.         Diagnoses and all orders for this visit:     Stenosis of left carotid artery  -     clopidogrel (Plavix) 75 mg tablet; Take 1 tablet (75 mg total) by mouth daily            Subjective:       Patient ID: Nancy Jones is a 64 y.o. female.     Patient is here today to review results of a CTA head and neck w/wo contrast done 4/16/2024 and a CV done 2/23/2024. She is s/p an attempted and aborted left CEA done 1/31/ Patient denies any TIA or Stroke symptoms. She is " "taking Eliquis and ASA 81 mg. She is a former smoker.      Adina returns to the office to review most recent CTA of the head and neck.           The following portions of the patient's history were reviewed and updated as appropriate: allergies, current medications, past family history, past medical history, past social history, past surgical history, and problem list.     Review of Systems   Constitutional: Negative.    HENT: Negative.     Eyes: Negative.    Respiratory: Negative.     Cardiovascular: Negative.    Gastrointestinal:  Positive for abdominal pain (LLQ ABD pain).   Endocrine: Negative.    Genitourinary: Negative.    Musculoskeletal: Negative.    Skin: Negative.    Allergic/Immunologic: Negative.    Neurological: Negative.    Hematological: Negative.    Psychiatric/Behavioral: Negative.            Objective:        /72 (BP Location: Left arm, Patient Position: Sitting, Cuff Size: Standard)   Pulse 56   Ht 5' 4\" (1.626 m)   Wt 85.7 kg (189 lb)   LMP  (LMP Unknown)   SpO2 95%   BMI 32.44 kg/m²             Physical Exam  Constitutional:       General: She is not in acute distress.     Appearance: She is well-developed. She is obese. She is not ill-appearing, toxic-appearing or diaphoretic.   HENT:      Head: Normocephalic and atraumatic.      Right Ear: External ear normal.      Left Ear: External ear normal.   Eyes:      General: No scleral icterus.     Conjunctiva/sclera: Conjunctivae normal.   Neck:      Trachea: No tracheal deviation.   Cardiovascular:      Rate and Rhythm: Normal rate and regular rhythm.      Heart sounds: Normal heart sounds.   Pulmonary:      Effort: Pulmonary effort is normal.      Breath sounds: Normal breath sounds.   Abdominal:      Palpations: Abdomen is soft. There is no mass (no appreciable aortic pulsation/aneurysm).      Tenderness: There is no guarding or rebound.      Hernia: A hernia is present.   Musculoskeletal:         General: Normal range of motion.      " Cervical back: Normal range of motion and neck supple.   Skin:     General: Skin is warm and dry.   Neurological:      Mental Status: She is alert and oriented to person, place, and time.   Psychiatric:         Mood and Affect: Mood normal.         Behavior: Behavior normal.         Thought Content: Thought content normal.         Judgment: Judgment normal.

## 2024-04-29 NOTE — DISCHARGE INSTR - AVS FIRST PAGE
DISCHARGE INSTRUCTIONS  CAROTID ARTERIOGRAM/STENT    ACTIVITY:  Limit your physical activity to walking for the first week and then increase your activity as tolerated.  Walking up steps and normal activities may be resumed as you feel ready.  Avoid strenuous activity such as vigorous exercise.  Avoid heavy lifting (do not lift more than 15 pounds) for the first four weeks after surgery.  You should not drive a car for at least one week following discharge from the hospital and you are off all narcotic pain medication.  You may ride in a car.  If you have had a stroke or mini-stroke, please consult with your neurologist prior to driving.    DO NOT START OR RESUME ANY PREVIOUSLY PLANNED THERAPY (PHYSICAL, CARDIAC, REHAB, ETC…) UNTIL YOU DISCUSS WITH YOUR SURGEON AND THEY FEEL IT IS SAFE TO ENGAGE IN THERAPY.    DIET:  Resume your normal diet.  Good nutrition is important for healing of your incision.  Drink more water than usual for the next 24 hours.  You can expect to have a sore throat and trouble swallowing after surgery which should improve quickly.  If you feel like you are choking, please call your doctor.    RECURRENT SYMPTOMS: If you develop any new numbness, weakness, vision changes, drooping of the face, or difficulty with speech after discharge, CALL 911 or go to the nearest Emergency department immediately.    INCISION/PROCEDURE SITE: You have an incision site at your neck and a procedure site in your groin.  You have surgical glue at these sites.  There are stitches present under the skin which will absorb on their own.  The glue is used to cover the access, assist in closure, and prevent contamination. This adhesive will darken and peel away on its own within one to two weeks. Do not pick at it.  If you have a bandage over either site, please remove this on the second day after surgery.    You should shower daily.  Wash incision daily with soap and water, but do not rub or scrub the incision; rinse  thoroughly and pat dry.   Do not bathe in a tub or swim for the first 4 weeks following surgery or if you have any open wounds.  It is normal to have some bruising, swelling or discoloration around the incision.  IF increasing redness, pain, bleeding or a bulge develops, call our office immediately.    If you notice any active bleeding at the site, apply pressure to the site and call our office ([phone]) or [phone].    FOLLOW UP STUDIES:  Doppler ultrasound studies are very important to your post-procedure care.  Your Physician will arrange for them at your first post-procedure visit.  The first study will be 6 weeks after surgery.    FOLLOW UP APPOINTMENTS:  Making and keeping follow up appointments and ultrasound tests are important to your recovery.  If you have difficulty making it to or keeping your follow up appointments, call the office.    If you have increased pain, fever >101.5, increased drainage, redness or a bad smell at your surgery site, new coldness/numbness of your arm or leg, please call us immediately and GO directly to the ER.    PLEASE CALL THE OFFICE IF YOU HAVE ANY QUESTIONS  [phone]  [phone] [address], [address], [place] [address]  [address] [facility], [address], [place] [address]  [address] [place] [address]  [address] [place] [address], [address], [place] [address]  [address] [facility], [place] [address]  [address] [facility], 2nd Floor, [address] [address]  [address] [facility], [address], [place] [address]  [address] [facility], 1st Floor, [address], [place] [address]  [address], [facility] [address], [place] [address]  [address] [address]

## 2024-04-29 NOTE — ASSESSMENT & PLAN NOTE
Currently not in exacerbation.    Plan:   Continue home regimen Breo 1 puff daily, albuterol PRN

## 2024-04-29 NOTE — ANESTHESIA POSTPROCEDURE EVALUATION
Post-Op Assessment Note    CV Status:  Stable    Pain management: adequate       Mental Status:  Alert and awake   Hydration Status:  Euvolemic   PONV Controlled:  Controlled   Airway Patency:  Patent     Post Op Vitals Reviewed: Yes    No anethesia notable event occurred.    Staff: Anesthesiologist, CRNA     Reason for prolonged intubation > 24 hours:  Patient extubatedReason for prolonged intubation > 48 hours:  Patient extubated          BP      Temp      Pulse     Resp      SpO2

## 2024-04-29 NOTE — ASSESSMENT & PLAN NOTE
Currently not in exacerbation.      Plan:   Continue home regimen Breo 1 puff daily, albuterol PRN

## 2024-04-29 NOTE — PLAN OF CARE
Problem: Prexisting or High Potential for Compromised Skin Integrity  Goal: Skin integrity is maintained or improved  Description: INTERVENTIONS:  - Identify patients at risk for skin breakdown  - Assess and monitor skin integrity  - Assess and monitor nutrition and hydration status  - Monitor labs   - Assess for incontinence   - Turn and reposition patient  - Assist with mobility/ambulation  - Relieve pressure over bony prominences  - Avoid friction and shearing  - Provide appropriate hygiene as needed including keeping skin clean and dry  - Evaluate need for skin moisturizer/barrier cream  - Collaborate with interdisciplinary team   - Patient/family teaching  - Consider wound care consult   Outcome: Progressing     Problem: PAIN - ADULT  Goal: Verbalizes/displays adequate comfort level or baseline comfort level  Description: Interventions:  - Encourage patient to monitor pain and request assistance  - Assess pain using appropriate pain scale  - Administer analgesics based on type and severity of pain and evaluate response  - Implement non-pharmacological measures as appropriate and evaluate response  - Consider cultural and social influences on pain and pain management  - Notify physician/advanced practitioner if interventions unsuccessful or patient reports new pain  Outcome: Progressing     Problem: INFECTION - ADULT  Goal: Absence or prevention of progression during hospitalization  Description: INTERVENTIONS:  - Assess and monitor for signs and symptoms of infection  - Monitor lab/diagnostic results  - Monitor all insertion sites, i.e. indwelling lines, tubes, and drains  - Monitor endotracheal if appropriate and nasal secretions for changes in amount and color  - [place] appropriate cooling/warming therapies per order  - Administer medications as ordered  - Instruct and encourage patient and family to use good hand hygiene technique  - Identify and instruct in appropriate isolation precautions for  identified infection/condition  Outcome: Progressing  Goal: Absence of fever/infection during neutropenic period  Description: INTERVENTIONS:  - Monitor WBC    Outcome: Progressing     Problem: SAFETY ADULT  Goal: Patient will remain free of falls  Description: INTERVENTIONS:  - Educate patient/family on patient safety including physical limitations  - Instruct patient to call for assistance with activity   - Consult OT/PT to assist with strengthening/mobility   - Keep Call bell within reach  - Keep bed low and locked with side rails adjusted as appropriate  - Keep care items and personal belongings within reach  - Initiate and maintain comfort rounds  - Make Fall Risk Sign visible to staff  - Offer Toileting every 2 Hours, in advance of need  - Initiate/Maintain Bed alarm  - Obtain necessary fall risk management equipment  - Apply yellow socks and bracelet for high fall risk patients  - Consider moving patient to room near nurses station  Outcome: Progressing  Goal: Maintain or return to baseline ADL function  Description: INTERVENTIONS:  -  Assess patient's ability to carry out ADLs; assess patient's baseline for ADL function and identify physical deficits which impact ability to perform ADLs (bathing, care of mouth/teeth, toileting, grooming, dressing, etc.)  - Assess/evaluate cause of self-care deficits   - Assess range of motion  - Assess patient's mobility; develop plan if impaired  - Assess patient's need for assistive devices and provide as appropriate  - Encourage maximum independence but intervene and supervise when necessary  - Involve family in performance of ADLs  - Assess for home care needs following discharge   - Consider OT consult to assist with ADL evaluation and planning for discharge  - Provide patient education as appropriate  Outcome: Progressing  Goal: Maintains/Returns to pre admission functional level  Description: INTERVENTIONS:  - Perform AM-PAC 6 Click Basic Mobility/ Daily Activity  assessment daily.  - Set and communicate daily mobility goal to care team and patient/family/caregiver.   - Collaborate with rehabilitation services on mobility goals if consulted  - Perform Range of Motion 3 times a day.  - Reposition patient every 2 hours.  - Dangle patient 3 times a day  - Stand patient 3 times a day  - Ambulate patient 3 times a day  - Out of bed to chair 3 times a day   - Out of bed for meals 3 times a day  - Out of bed for toileting  - Record patient progress and toleration of activity level   Outcome: Progressing    Problem: Knowledge Deficit  Goal: Patient/family/caregiver demonstrates understanding of disease process, treatment plan, medications, and discharge instructions  Description: Complete learning assessment and assess knowledge base.  Interventions:  - Provide teaching at level of understanding  - Provide teaching via preferred learning methods  Outcome: Progressing     Problem: DISCHARGE PLANNING  Goal: Discharge to home or other facility with appropriate resources  Description: INTERVENTIONS:  - Identify barriers to discharge w/patient and caregiver  - Arrange for needed discharge resources and transportation as appropriate  - Identify discharge learning needs (meds, wound care, etc.)  - Arrange for interpretive services to assist at discharge as needed  - Refer to Case Management Department for coordinating discharge planning if the patient needs post-hospital services based on physician/advanced practitioner order or complex needs related to functional status, cognitive ability, or social support system  Outcome: Progressing

## 2024-04-29 NOTE — QUICK NOTE
Vascular Quick Note    Pt is 64 year old female with hx of L Carotid stenosis now s/p  L TCAR by  done today 4/29/24.    -L carotid incision site is clean, dry, well approximated with surgical glue in place. R groin incision site is clean, dry and well apprximated.  -Pt reports she is feeling well. Minimal complaints of pain the L neck incision.   -Denies any difficulty with eating or speaking.  -BP stable.  -Denies symptoms of numbness/ tingling/ weakness on one side of the body, facial droop, slurred speech or blindness in one eye.   -Reports improvement of L eye 'cloudiness'.    Plan  -Continue to monitor in ICU overnight.  -Frequent neurovascular checks.  -PRN pain medication as needed.  -Continue statin.  -Hemodynamic monitoring with BP goal systolic of 100-160.  -Monitor for any progression of swelling around the surgical site.

## 2024-04-29 NOTE — ASSESSMENT & PLAN NOTE
Is non compliant with CPAP at home, last use was about a year ago.  Is open to trailing CPAP while in  hospital.    Plan:   CPAP QHS

## 2024-04-29 NOTE — ASSESSMENT & PLAN NOTE
-Asymptomatic Left carotid stenosis  -S/P TCAR, left neck and right groin access site.  -A line in place for BP monitoring.  -Required some pressors for hypotension overnight.    Plan:  Frequent neurovascular checks.  Monitor BP, systolic BP goal of 100-160  Wean off of [place] as able.  Continue Aspirin, Plavix and statin.

## 2024-04-30 ENCOUNTER — TELEPHONE (OUTPATIENT)
Dept: BARIATRICS | Facility: CLINIC | Age: 64
End: 2024-04-30

## 2024-04-30 ENCOUNTER — DOCUMENTATION (OUTPATIENT)
Dept: VASCULAR SURGERY | Facility: CLINIC | Age: 64
End: 2024-04-30

## 2024-04-30 LAB
ANION GAP SERPL CALCULATED.3IONS-SCNC: 6 MMOL/L (ref 4–13)
APTT PPP: 29 SECONDS (ref 23–37)
BUN SERPL-MCNC: 17 MG/DL (ref 5–25)
CALCIUM SERPL-MCNC: 8.4 MG/DL (ref 8.4–10.2)
CHLORIDE SERPL-SCNC: 104 MMOL/L (ref 96–108)
CO2 SERPL-SCNC: 28 MMOL/L (ref 21–32)
CREAT SERPL-MCNC: 0.83 MG/DL (ref 0.6–1.3)
ERYTHROCYTE [DISTWIDTH] IN BLOOD BY AUTOMATED COUNT: 12.8 % (ref 11.6–15.1)
GFR SERPL CREATININE-BSD FRML MDRD: 73 ML/MIN/1.73SQ M
GLUCOSE SERPL-MCNC: 103 MG/DL (ref 65–140)
HCT VFR BLD AUTO: 29.8 % (ref 34.8–46.1)
HGB BLD-MCNC: 9.5 G/DL (ref 11.5–15.4)
INR PPP: 1.06 (ref 0.84–1.19)
MAGNESIUM SERPL-MCNC: 2.2 MG/DL (ref 1.9–2.7)
MCH RBC QN AUTO: 30.4 PG (ref 26.8–34.3)
MCHC RBC AUTO-ENTMCNC: 31.9 G/DL (ref 31.4–37.4)
MCV RBC AUTO: 95 FL (ref 82–98)
PLATELET # BLD AUTO: 198 THOUSANDS/UL (ref 149–390)
PMV BLD AUTO: 8.7 FL (ref 8.9–12.7)
POTASSIUM SERPL-SCNC: 3.6 MMOL/L (ref 3.5–5.3)
PROTHROMBIN TIME: 14 SECONDS (ref 11.6–14.5)
RBC # BLD AUTO: 3.13 MILLION/UL (ref 3.81–5.12)
SODIUM SERPL-SCNC: 138 MMOL/L (ref 135–147)
WBC # BLD AUTO: 7.47 THOUSAND/UL (ref 4.31–10.16)

## 2024-04-30 PROCEDURE — 83735 ASSAY OF MAGNESIUM: CPT

## 2024-04-30 PROCEDURE — 85027 COMPLETE CBC AUTOMATED: CPT | Performed by: STUDENT IN AN ORGANIZED HEALTH CARE EDUCATION/TRAINING PROGRAM

## 2024-04-30 PROCEDURE — 99024 POSTOP FOLLOW-UP VISIT: CPT | Performed by: SURGERY

## 2024-04-30 PROCEDURE — 85610 PROTHROMBIN TIME: CPT | Performed by: STUDENT IN AN ORGANIZED HEALTH CARE EDUCATION/TRAINING PROGRAM

## 2024-04-30 PROCEDURE — 80048 BASIC METABOLIC PNL TOTAL CA: CPT | Performed by: STUDENT IN AN ORGANIZED HEALTH CARE EDUCATION/TRAINING PROGRAM

## 2024-04-30 PROCEDURE — 85730 THROMBOPLASTIN TIME PARTIAL: CPT | Performed by: STUDENT IN AN ORGANIZED HEALTH CARE EDUCATION/TRAINING PROGRAM

## 2024-04-30 PROCEDURE — 99232 SBSQ HOSP IP/OBS MODERATE 35: CPT | Performed by: INTERNAL MEDICINE

## 2024-04-30 RX ORDER — POLYETHYLENE GLYCOL 3350 17 G/17G
17 POWDER, FOR SOLUTION ORAL DAILY
Status: DISCONTINUED | OUTPATIENT
Start: 2024-04-30 | End: 2024-05-02 | Stop reason: HOSPADM

## 2024-04-30 RX ORDER — DOCUSATE SODIUM 100 MG/1
100 CAPSULE, LIQUID FILLED ORAL DAILY
Status: DISCONTINUED | OUTPATIENT
Start: 2024-04-30 | End: 2024-05-02 | Stop reason: HOSPADM

## 2024-04-30 RX ORDER — POTASSIUM CHLORIDE 20 MEQ/1
40 TABLET, EXTENDED RELEASE ORAL ONCE
Status: COMPLETED | OUTPATIENT
Start: 2024-04-30 | End: 2024-04-30

## 2024-04-30 RX ORDER — ONDANSETRON 4 MG/1
4 TABLET, ORALLY DISINTEGRATING ORAL EVERY 6 HOURS PRN
Status: DISCONTINUED | OUTPATIENT
Start: 2024-04-30 | End: 2024-05-02 | Stop reason: HOSPADM

## 2024-04-30 RX ADMIN — SODIUM CHLORIDE 500 ML: 0.9 INJECTION, SOLUTION INTRAVENOUS at 01:20

## 2024-04-30 RX ADMIN — HEPARIN SODIUM 5000 UNITS: 5000 INJECTION INTRAVENOUS; SUBCUTANEOUS at 06:02

## 2024-04-30 RX ADMIN — POTASSIUM CHLORIDE 40 MEQ: 1500 TABLET, EXTENDED RELEASE ORAL at 08:49

## 2024-04-30 RX ADMIN — PANTOPRAZOLE SODIUM 20 MG: 20 TABLET, DELAYED RELEASE ORAL at 06:02

## 2024-04-30 RX ADMIN — ONDANSETRON 4 MG: 2 INJECTION INTRAMUSCULAR; INTRAVENOUS at 00:50

## 2024-04-30 RX ADMIN — ACETAMINOPHEN 325MG 975 MG: 325 TABLET ORAL at 14:27

## 2024-04-30 RX ADMIN — ACETAMINOPHEN 325MG 975 MG: 325 TABLET ORAL at 21:00

## 2024-04-30 RX ADMIN — ONDANSETRON 4 MG: 4 TABLET, ORALLY DISINTEGRATING ORAL at 14:32

## 2024-04-30 RX ADMIN — OXYCODONE 5 MG: 5 TABLET ORAL at 21:05

## 2024-04-30 RX ADMIN — DOCUSATE SODIUM 100 MG: 100 CAPSULE, LIQUID FILLED ORAL at 08:49

## 2024-04-30 RX ADMIN — METHOCARBAMOL 500 MG: 500 TABLET ORAL at 08:49

## 2024-04-30 RX ADMIN — LORAZEPAM 1 MG: 1 TABLET ORAL at 08:49

## 2024-04-30 RX ADMIN — SENNOSIDES 8.6 MG: 8.6 TABLET, FILM COATED ORAL at 08:49

## 2024-04-30 RX ADMIN — AMIODARONE HYDROCHLORIDE 100 MG: 100 TABLET ORAL at 16:30

## 2024-04-30 RX ADMIN — LORAZEPAM 1 MG: 1 TABLET ORAL at 14:32

## 2024-04-30 RX ADMIN — METHOCARBAMOL 500 MG: 500 TABLET ORAL at 21:00

## 2024-04-30 RX ADMIN — OXYCODONE 5 MG: 5 TABLET ORAL at 00:45

## 2024-04-30 RX ADMIN — FLUTICASONE FUROATE AND VILANTEROL TRIFENATATE 1 PUFF: 200; 25 POWDER RESPIRATORY (INHALATION) at 08:53

## 2024-04-30 RX ADMIN — SODIUM CHLORIDE, SODIUM LACTATE, POTASSIUM CHLORIDE, AND CALCIUM CHLORIDE 75 ML/HR: .6; .31; .03; .02 INJECTION, SOLUTION INTRAVENOUS at 01:08

## 2024-04-30 RX ADMIN — LORAZEPAM 1 MG: 1 TABLET ORAL at 21:00

## 2024-04-30 RX ADMIN — CLOPIDOGREL BISULFATE 75 MG: 75 TABLET ORAL at 08:49

## 2024-04-30 RX ADMIN — HEPARIN SODIUM 5000 UNITS: 5000 INJECTION INTRAVENOUS; SUBCUTANEOUS at 14:33

## 2024-04-30 RX ADMIN — METHOCARBAMOL 500 MG: 500 TABLET ORAL at 16:30

## 2024-04-30 RX ADMIN — POLYETHYLENE GLYCOL 3350 17 G: 17 POWDER, FOR SOLUTION ORAL at 08:47

## 2024-04-30 RX ADMIN — Medication 2.5 MG: at 08:58

## 2024-04-30 RX ADMIN — ASPIRIN 81 MG CHEWABLE TABLET 81 MG: 81 TABLET CHEWABLE at 08:47

## 2024-04-30 RX ADMIN — HEPARIN SODIUM 5000 UNITS: 5000 INJECTION INTRAVENOUS; SUBCUTANEOUS at 21:00

## 2024-04-30 RX ADMIN — PHENYLEPHRINE HYDROCHLORIDE 10 MCG/MIN: 10 INJECTION INTRAVENOUS at 02:53

## 2024-04-30 RX ADMIN — ACETAMINOPHEN 325MG 975 MG: 325 TABLET ORAL at 06:02

## 2024-04-30 NOTE — UTILIZATION REVIEW
"Initial Clinical Review    Elective inpatient surgical procedure  Age/Sex: 64 y.o. female  Surgery Date: 4/29/2024  Procedure: Left - ANGIOPLASTY ARTERY CAROTID W/ STENT   Anesthesia: Conscious Sedation    Operative Findings: High grade focal left ICA stenosis with ulcerated plaque  Predilatation with a 5 x 3 aviator balloon followed by placement of a 8 mm x 40 mm en route stent.  Total flow reversal time 9 minutes.  Patient awoke from anesthesia neurologically intact.    POD#1 Progress Note:     Critical care  s/p TCAR left neck/R groin access  Needed [place] infusion to maintain goal SBP between 100-160. EKG: No acute events on telemetry overnight.   c/w aspirin, plavix, statin. Frequent neuro checks  BP goal 100-160 SBP - on phenylephrine to maintain BP - wean phenylephrine  c/w amiodarone, metoprolol, diltiazem with hold parameters  hold Eliquis for now, home Breo, albuterol PRN  - CPAP qhs  - PPI  - SQH  - remain in step down 1    Vascular Surgery    TCAR POD1, neurologically intact requiring titratable BP medication.   Continue BP support, wean as tolerated  Pain control PRN  Continue antiplatelet therapy  Encourage OOB w assist  Admission Orders: Date/Time/Statement:   Admission Orders (From admission, onward)       Ordered        04/29/24 1022  Inpatient Admission  Once                          Orders Placed This Encounter   Procedures    Inpatient Admission     Standing Status:   Standing     Number of Occurrences:   1     Order Specific Question:   Level of Care     Answer:   Critical Care [15]     Order Specific Question:   Estimated length of stay     Answer:   Inpatient Only Surgery     Vital Signs: BP (!) 102/42   Pulse 64   Temp 98 °F (36.7 °C) (Oral)   Resp (!) 25   Ht 5' 4\" (1.626 m)   Wt 92.8 kg (204 lb 9.4 oz)   LMP  (LMP Unknown)   SpO2 92%   BMI 35.12 kg/m²     Pertinent Labs/Diagnostic Test Results:   IR carotid stent    (Results Pending)         Results from last 7 days   Lab Units " "04/30/24  0429 04/29/24  1710 04/29/24  1237   WBC Thousand/uL 7.47 7.72  --    HEMOGLOBIN g/dL 9.5* 10.3*  --    HEMATOCRIT % 29.8* 30.9*  --    PLATELETS Thousands/uL 198 194 174   TOTAL NEUT ABS Thousands/µL  --  5.99  --          Results from last 7 days   Lab Units 04/30/24  0429 04/29/24  1710   SODIUM mmol/L 138 137   POTASSIUM mmol/L 3.6 4.0   CHLORIDE mmol/L 104 103   CO2 mmol/L 28 26   ANION GAP mmol/L 6 8   BUN mg/dL 17 20   CREATININE mg/dL 0.83 1.09   EGFR ml/min/1.73sq m 73 53   CALCIUM mg/dL 8.4 8.5   CALCIUM, IONIZED mmol/L  --  1.08*   MAGNESIUM mg/dL 2.2 1.7*   PHOSPHORUS mg/dL  --  4.1     Results from last 7 days   Lab Units 04/29/24  1710   AST U/L 14   ALT U/L 13   ALK PHOS U/L 60   TOTAL PROTEIN g/dL 6.1*   ALBUMIN g/dL 4.0   TOTAL BILIRUBIN mg/dL 0.37         Results from last 7 days   Lab Units 04/30/24  0429 04/29/24  1710   GLUCOSE RANDOM mg/dL 103 168*             No results found for: \"BETA-HYDROXYBUTYRATE\"                           Results from last 7 days   Lab Units 04/30/24  0429   PROTIME seconds 14.0   INR  1.06   PTT seconds 29             Diet: regular diet  Mobility  Initially post OP HOB > 30 degrees while on bedrest; 4/30  OOB  DVT Prophylaxis: heparin (porcine), 5,000 Units, Subcutaneous, Q8H BONIFACIO, ASA 81 mg PO daily, SCD    Medications/Pain Control:   Scheduled Medications:  acetaminophen, 975 mg, Oral, Q8H BONIFACIO  amiodarone, 100 mg, Oral, Daily  aspirin, 81 mg, Oral, Daily  clopidogrel, 75 mg, Oral, Daily  diltiazem, 120 mg, Oral, Daily  docusate sodium, 100 mg, Oral, Daily  fluticasone-vilanterol, 1 puff, Inhalation, Daily  furosemide, 40 mg, Oral, Daily  heparin (porcine), 5,000 Units, Subcutaneous, Q8H BONIFACIO  LORazepam, 1 mg, Oral, TID  methocarbamol, 500 mg, Oral, TID  metoprolol succinate, 100 mg, Oral, BID  pantoprazole, 20 mg, Oral, Daily Before Breakfast  polyethylene glycol, 17 g, Oral, Daily  senna, 1 tablet, Oral, Daily  spironolactone, 25 mg, Oral, " BID      Continuous IV Infusions:  niCARdipine, 1-15 mg/hr, Intravenous, Continuous PRN  phenylephine,  mcg/min, Intravenous, Continuous PRN    PRN Meds:  labetalol, 5 mg, Intravenous, Q15 Min PRN   And  hydrALAZINE, 15 mg, Intravenous, Q15 Min PRN   And  niCARdipine, 1-15 mg/hr, Intravenous, Continuous PRN  LORazepam, 2 mg, Oral, Q8H PRN  ondansetron, 4 mg, Oral, Q6H PRN  oxyCODONE, 5 mg, Oral, Q6H PRN  oxyCODONE, 2.5 mg, Oral, Q6H PRN  phenylephine,  mcg/min, Intravenous, Continuous PRN  Network Utilization Review Department  ATTENTION: Please call with any questions or concerns to [phone] and carefully listen to the prompts so that you are directed to the right person. All voicemails are confidential.   For Discharge needs, contact Care Management DC Support Team at [phone] opt. [phone]  Send all requests for admission clinical reviews, approved or denied determinations and any other requests to dedicated fax number below belonging to the campus where the patient is receiving treatment. List of dedicated fax numbers for the Facilities:  FACILITY NAME UR FAX NUMBER   ADMISSION DENIALS (Administrative/Medical Necessity) [phone]   DISCHARGE SUPPORT TEAM (NETWORK) [phone]   PARENT CHILD HEALTH (Maternity/NICU/Pediatrics) [phone]   [facility] [phone]   [facility] [phone]   [facility] [phone]   [facility] [phone]   [facility] [phone]   [facility] [phone]   [facility] [phone]   [facility] [phone]   [facility] [phone]   On license of [facility] [phone]   [facility] [phone]   [facility] [phone]

## 2024-04-30 NOTE — ASSESSMENT & PLAN NOTE
63 yo female ex-smoker w/ a hx of PAF, HTN, familial hypercholesterolemia, aortic regurg, obesity, [place] on CPAP, ventral hernia, GERD and IBS, presented to [facility] on 4/29/24 for scheduled TCAR. Pt has a hx of an aborted L CEA in 1/2023 due to inability to attain adequate distal ICA exposure to allow procedure to be performed safely. Imaging showed persistent high-grade/near occlusive L ICA stenosis for which pt underwent a L TCAR on 4/29/24. Overnight, pt remained relatively hypotensive w/ SBP 80-90s for which a darron drip was started.    POD #1: Relative hypotension continues w/ SBP  on darron drip. Hgb decreased to 10.3 post-op and 9.5 this am w/ no overt signs of bleeding. Recent pre-op baseline Hgb appears to be around 12. Pt is ambulating short distances and voiding without difficulty. L neck incision well-approx, no swelling, erythema or drainage; surgical glue closure. R groin site soft, no swelling erythema or drainage. Mild L tongue deviation noted on exam. Otherwise, pt remains neurologically intact w/ no unilateral deficits. Pt reports R-sided headache last night w/ 10/10 pain, relieved with ice. Per pt, she used to get migraine headaches daily. This am, R-sided headache is much-improved and pt rates it at 4/10. Pt reports mild R eye blurriness and denies any visual changes on L side. Pt denies any difficulty speaking. She reports mild difficulty eating chicken fingers last evening, but was able to swallow effectively after drinking liquids. Otherwise, pt denies any unilateral numbness, tingling or weakness and denies any further complaints at this time.    Plan:  -Asymptomatic L GILSON  -S/P L TCAR on 4/29 (POD #1)  -L neck incision well-approx, no erythema or drainage, surgical glue closure  -R groin site soft, no swelling or drainage  -Continue frequent neurovascular checks  -Continue diet as tolerated  -OOB as tolerated  -Wean darron drip as tolerated to keep -160  -Continue ASA and plavix for  management of GILSON  -Resume eliquis for PAF on discharge  -Continue repatha for familial hypercholesterolemia  -Begin home colace and miralax per pt request for constipation  -Continue ICU care for now  -Follow up w/ vascular surgery in the office; appt scheduled for 5/13  -Will discuss w/ Dr. Godwin

## 2024-04-30 NOTE — PROGRESS NOTES
[facility]  Progress Note  Name: Nancy Jones I  MRN: [id number]  Unit/Bed#: ICU 04 I Date of Admission: 4/29/2024   Date of Service: 4/30/2024 I Hospital Day: 1    Assessment/Plan   * Stenosis of left carotid artery  Assessment & Plan  -Asymptomatic Left carotid stenosis  -S/P TCAR, left neck and right groin access site.  -A line in place for BP monitoring.  -Required some pressors for hypotension overnight.    Plan:  Frequent neurovascular checks.  Monitor BP, systolic BP goal of 100-160  Wean off of [place] as able.  Continue Aspirin, Plavix and statin.    Essential hypertension  Assessment & Plan  Home furosemide, spironolactone, diltiazem and metoprolol.  Required some pressor support for hypotension.    Wean off of [place] as able.  Hold home BP medications for now.    A-fib ([facility])  Assessment & Plan  Home regimen amiodarone, metoprolol, diltiazem and Eliquis.    Plan:  Continue amiodarone and metoprolol and diltiazem.  Hold Eliquis for now as per vascular surgery.    COPD (chronic obstructive pulmonary disease) ([facility])  Assessment & Plan  Currently not in exacerbation.    Plan:   Continue home regimen Breo 1 puff daily, albuterol PRN      CAMILLE (obstructive sleep apnea)  Assessment & Plan  Is non compliant with CPAP at home, last use was about a year ago.  Is open to trailing CPAP while in [facility].    Plan:   CPAP QHS    Gastroesophageal reflux disease without esophagitis  Assessment & Plan  Home regimen omeprazole.    Plan:  Protonix as omeprazole is not in hospital formulary.           Disposition: Critical care    ICU Core Measures     A: Assess, Prevent, and Manage Pain Has pain been assessed? Yes  Need for changes to pain regimen? No   B: Both SAT/SAT N/A   C: Choice of Sedation RASS Goal: 0 Alert and Calm  Need for changes to sedation or analgesia regimen? No   D: Delirium CAM-ICU: Negative   E: Early Mobility  Plan for early mobility? Yes   F: Family Engagement Plan for family  engagement today? Yes       Review of Invasive Devices:        [place] Plan: Discontinue arterial line    Prophylaxis:  VTE VTE covered by:  heparin (porcine), Subcutaneous, 5,000 Units at 04/30/24 0602       Stress Ulcer  covered byomeprazole (PriLOSEC) 40 MG capsule [[id number]] (Long-Term Med), pantoprazole (PROTONIX) EC tablet 20 mg [[id number]]         Significant 24hr Events     24hr events: Needed [place] infusion to maintain goal SBP between 100-160. No acute events overnight.     Subjective  Patient endorsed some right sided periorbital headache which improved with ice packs. Reports some blurring of her vision that has improved from yesterday. Mild dysphagia when eating solids.    Review of Systems   Constitutional:  Negative for activity change, appetite change, chills and fever.   HENT:  Positive for trouble swallowing (mild). Negative for congestion and sore throat.    Cardiovascular:  Negative for chest pain.   Gastrointestinal:  Negative for abdominal pain.   Genitourinary:  Negative for dysuria, frequency and urgency.   Musculoskeletal:  Negative for arthralgias and myalgias.   Neurological:  Positive for headaches. Negative for light-headedness.      Objective                            Vitals I/O      Most Recent Min/Max in 24hrs   Temp 98 °F (36.7 °C) Temp  Min: 96.7 °F (35.9 °C)  Max: 98.5 °F (36.9 °C)   Pulse 60 Pulse  Min: 56  Max: 68   Resp 12 Resp  Min: 9  Max: 41   /70 BP  Min: 87/40  Max: 147/69   O2 Sat 96 % SpO2  Min: 85 %  Max: 99 %      Intake/Output Summary (Last 24 hours) at 4/30/2024 0837  Last data filed at 4/30/2024 0600  Gross per 24 hour   Intake 5131.98 ml   Output --   Net 5131.98 ml       Diet Cardiovascular; Cardiac    Invasive Monitoring   Arterial Line  [place] /46  Arterial Line BP  Min: 84/34  Max: 149/63   MAP 74 mmHg  Arterial Line MAP (mmHg)  Min: 52 mmHg  Max: 98 mmHg           Physical Exam   Physical Exam  Vitals reviewed.   Eyes:      General: No dysconjugate  gaze     Extraocular Movements: Extraocular movements intact.      Conjunctiva/sclera: Conjunctivae normal.   Skin:     General: Skin is warm.      Coloration: Skin is not pale.      Comments: Left neck incision site dry and intact, minimal bruising and swelling.  Right groin incision site dry and intact, minimal bruising.   HENT:      Head: Normocephalic.      Mouth/Throat:      Mouth: Mucous membranes are moist.   Cardiovascular:      Rate and Rhythm: Normal rate and regular rhythm.   Abdominal: General: Bowel sounds are normal.      Palpations: Abdomen is soft.      Tenderness: There is no abdominal tenderness.   Constitutional:       General: She is not in acute distress.     Appearance: She is well-developed. She is not ill-appearing.   Pulmonary:      Effort: Pulmonary effort is normal. No accessory muscle usage, respiratory distress or accessory muscle usage.      Breath sounds: Normal breath sounds. No stridor.   Neurological:      Mental Status: She is alert and oriented to person, place and time.            Diagnostic Studies      EKG: No acute events on telemetry overnight.  Imaging: No imaging obtained this admission.     Medications:  Scheduled PRN   acetaminophen, 975 mg, Q8H BONIFACIO  amiodarone, 100 mg, Daily  aspirin, 81 mg, Daily  clopidogrel, 75 mg, Daily  diltiazem, 120 mg, Daily  docusate sodium, 100 mg, Daily  fluticasone-vilanterol, 1 puff, Daily  furosemide, 40 mg, Daily  heparin (porcine), 5,000 Units, Q8H BONIFACIO  LORazepam, 1 mg, TID  methocarbamol, 500 mg, TID  metoprolol succinate, 100 mg, BID  pantoprazole, 20 mg, Daily Before Breakfast  polyethylene glycol, 17 g, Daily  potassium chloride, 40 mEq, Once  senna, 1 tablet, Daily  spironolactone, 25 mg, BID      labetalol, 5 mg, Q15 Min PRN   And  hydrALAZINE, 15 mg, Q15 Min PRN   And  niCARdipine, 1-15 mg/hr, Continuous PRN  LORazepam, 2 mg, Q8H PRN  ondansetron, 4 mg, Q6H PRN  oxyCODONE, 5 mg, Q6H PRN  oxyCODONE, 2.5 mg, Q6H PRN  sodium chloride,  500 mL, Once PRN   Followed by  phenylephine,  mcg/min, Continuous PRN       Continuous    niCARdipine, 1-15 mg/hr  phenylephine,  mcg/min, Last Rate: 30 mcg/min (04/30/24 0726)         Labs:    CBC    Recent Labs     04/29/24 1710 04/30/24  0429   WBC 7.72 7.47   HGB 10.3* 9.5*   HCT 30.9* 29.8*    198     BMP    Recent Labs     04/29/24 1710 04/30/24  0429   SODIUM 137 138   K 4.0 3.6    104   CO2 26 28   AGAP 8 6   BUN 20 17   CREATININE 1.09 0.83   CALCIUM 8.5 8.4       Coags    Recent Labs     04/30/24  0429   INR 1.06   PTT 29        Additional Electrolytes  Recent Labs     04/29/24 1710 04/30/24  0429   MG 1.7* 2.2   PHOS 4.1  --    CAIONIZED 1.08*  --           Blood Gas    No recent results  No recent results LFTs  Recent Labs     04/29/24 1710   ALT 13   AST 14   ALKPHOS 60   ALB 4.0   TBILI 0.37       Infectious  No recent results  Glucose  Recent Labs     04/29/24 1710 04/30/24  0429   GLUC 168* 103               Eder Hairston MD

## 2024-04-30 NOTE — PROGRESS NOTES
Vascular Nurse Navigator Post Op Education    Met with patient at bedside to introduce myself as Vascular Nurse Navigator and explained my role.  Patient is appropriate and accepting to education. Patient was educated with Review of written materials provided, Teachback, Explanation, Demonstration, and Question & Answer on expectations of post op care and recovery on Left TCAR. Patient is a former smoker, quit 5 years ago, as such Smoking effects on the lungs, tobacco triggers, and Smoking cessation was reviewed. Education provided to patient on infection prevention, activity limitations, when to call the office, importance of follow up, and incisional care.  Discharge instruction handout provided to patient to review.

## 2024-04-30 NOTE — PLAN OF CARE
Problem: Prexisting or High Potential for Compromised Skin Integrity  Goal: Skin integrity is maintained or improved  Description: INTERVENTIONS:  - Identify patients at risk for skin breakdown  - Assess and monitor skin integrity  - Assess and monitor nutrition and hydration status  - Monitor labs   - Assess for incontinence   - Turn and reposition patient  - Assist with mobility/ambulation  - Relieve pressure over bony prominences  - Avoid friction and shearing  - Provide appropriate hygiene as needed including keeping skin clean and dry  - Evaluate need for skin moisturizer/barrier cream  - Collaborate with interdisciplinary team   - Patient/family teaching  - Consider wound care consult   Outcome: Progressing     Problem: PAIN - ADULT  Goal: Verbalizes/displays adequate comfort level or baseline comfort level  Description: Interventions:  - Encourage patient to monitor pain and request assistance  - Assess pain using appropriate pain scale  - Administer analgesics based on type and severity of pain and evaluate response  - Implement non-pharmacological measures as appropriate and evaluate response  - Consider cultural and social influences on pain and pain management  - Notify physician/advanced practitioner if interventions unsuccessful or patient reports new pain  Outcome: Progressing     Problem: INFECTION - ADULT  Goal: Absence or prevention of progression during hospitalization  Description: INTERVENTIONS:  - Assess and monitor for signs and symptoms of infection  - Monitor lab/diagnostic results  - Monitor all insertion sites, i.e. indwelling lines, tubes, and drains  - Monitor endotracheal if appropriate and nasal secretions for changes in amount and color  - [place] appropriate cooling/warming therapies per order  - Administer medications as ordered  - Instruct and encourage patient and family to use good hand hygiene technique  - Identify and instruct in appropriate isolation precautions for  identified infection/condition  Outcome: Progressing  Goal: Absence of fever/infection during neutropenic period  Description: INTERVENTIONS:  - Monitor WBC    Outcome: Progressing     Problem: SAFETY ADULT  Goal: Patient will remain free of falls  Description: INTERVENTIONS:  - Educate patient/family on patient safety including physical limitations  - Instruct patient to call for assistance with activity   - Consult OT/PT to assist with strengthening/mobility   - Keep Call bell within reach  - Keep bed low and locked with side rails adjusted as appropriate  - Keep care items and personal belongings within reach  - Initiate and maintain comfort rounds  - Make Fall Risk Sign visible to staff  - Offer Toileting   - Apply yellow socks and bracelet for high fall risk patients  - Consider moving patient to room near nurses station  Outcome: Progressing  Goal: Maintain or return to baseline ADL function  Description: INTERVENTIONS:  -  Assess patient's ability to carry out ADLs; assess patient's baseline for ADL function and identify physical deficits which impact ability to perform ADLs (bathing, care of mouth/teeth, toileting, grooming, dressing, etc.)  - Assess/evaluate cause of self-care deficits   - Assess range of motion  - Assess patient's mobility; develop plan if impaired  - Assess patient's need for assistive devices and provide as appropriate  - Encourage maximum independence but intervene and supervise when necessary  - Involve family in performance of ADLs  - Assess for home care needs following discharge   - Consider OT consult to assist with ADL evaluation and planning for discharge  - Provide patient education as appropriate  Outcome: Progressing  Goal: Maintains/Returns to pre admission functional level  Description: INTERVENTIONS:  - Perform AM-PAC 6 Click Basic Mobility/ Daily Activity assessment daily.  - Set and communicate daily mobility goal to care team and patient/family/caregiver.   - Collaborate  with rehabilitation services on mobility goals if consulted  - Perform Range of Motion   - Out of bed for toileting  - Record patient progress and toleration of activity level   Outcome: Progressing     Problem: DISCHARGE PLANNING  Goal: Discharge to home or other facility with appropriate resources  Description: INTERVENTIONS:  - Identify barriers to discharge w/patient and caregiver  - Arrange for needed discharge resources and transportation as appropriate  - Identify discharge learning needs (meds, wound care, etc.)  - Arrange for interpretive services to assist at discharge as needed  - Refer to Case Management Department for coordinating discharge planning if the patient needs post-hospital services based on physician/advanced practitioner order or complex needs related to functional status, cognitive ability, or social support system  Outcome: Progressing     Problem: Knowledge Deficit  Goal: Patient/family/caregiver demonstrates understanding of disease process, treatment plan, medications, and discharge instructions  Description: Complete learning assessment and assess knowledge base.  Interventions:  - Provide teaching at level of understanding  - Provide teaching via preferred learning methods  Outcome: Progressing

## 2024-04-30 NOTE — PROGRESS NOTES
[facility]  Progress Note  Name: Nancy Jones I  MRN: [id number]  Unit/Bed#: ICU 04 I Date of Admission: 4/29/2024   Date of Service: 4/30/2024 I Hospital Day: 1    Assessment/Plan   * Stenosis of left carotid artery  Assessment & Plan  63 yo female ex-smoker w/ a hx of PAF, HTN, familial hypercholesterolemia, aortic regurg, obesity, CAMILLE on CPAP, ventral hernia, GERD and IBS, presented to [facility] on 4/29/24 for scheduled TCAR. Pt has a hx of an aborted L CEA in 1/2023 due to inability to attain adequate distal ICA exposure to allow procedure to be performed safely. Imaging showed persistent high-grade/near occlusive L ICA stenosis for which pt underwent a L TCAR on 4/29/24. Overnight, pt remained relatively hypotensive w/ SBP 80-90s for which a darron drip was started.    POD #1: Relative hypotension continues w/ SBP  on darron drip. Hgb decreased to 10.3 post-op and 9.5 this am w/ no overt signs of bleeding. Recent pre-op baseline Hgb appears to be around 12. Pt is ambulating short distances and voiding without difficulty. L neck incision well-approx, no swelling, erythema or drainage; surgical glue closure. R groin site soft, no swelling erythema or drainage. Mild L tongue deviation noted on exam. Otherwise, pt remains neurologically intact w/ no unilateral deficits. Pt reports R-sided headache last night w/ 10/10 pain, relieved with ice. Per pt, she used to get migraine headaches daily. This am, R-sided headache is much-improved and pt rates it at 4/10. Pt reports mild R eye blurriness and denies any visual changes on L side. Pt denies any difficulty speaking. She reports mild difficulty eating chicken fingers last evening, but was able to swallow effectively after drinking liquids. Otherwise, pt denies any unilateral numbness, tingling or weakness and denies any further complaints at this time.    Plan:  -Asymptomatic L GILSON  -S/P L TCAR on 4/29 (POD #1)  -L neck incision well-approx, no  "erythema or drainage, surgical glue closure  -R groin site soft, no swelling or drainage  -Continue frequent neurovascular checks  -Continue diet as tolerated  -OOB as tolerated  -Wean darron drip as tolerated to keep -160  -Continue ASA and plavix for management of GILSON  -Resume eliquis for PAF on discharge  -Continue repatha for familial hypercholesterolemia  -Begin home colace and miralax per pt request for constipation  -Continue ICU care for now  -Follow up w/ vascular surgery in the office; appt scheduled for 5/13  -Will discuss w/ Dr. Godwin      Subjective:  Pt reports R-sided headache last night w/ 10/10 pain, relieved with ice. Per pt, she used to get migraine headaches daily. This am, R-sided headache is much-improved and pt rates it at 4/10. Pt reports mild R eye blurriness and denies any visual changes on L side. Pt denies any difficulty speaking. She reports mild difficulty eating chicken fingers last evening, but was able to swallow effectively after drinking liquids. Pt is requesting home colace and miralax be restarted to avoid any constipation. Otherwise, pt denies any unilateral numbness, tingling or weakness and denies any further complaints at this time.     Vitals:  /70   Pulse 60   Temp 98 °F (36.7 °C) (Oral)   Resp 12   Ht 5' 4\" (1.626 m)   Wt 92.8 kg (204 lb 9.4 oz)   LMP  (LMP Unknown)   SpO2 96%   BMI 35.12 kg/m²     I/Os:  I/O last 3 completed shifts:  In: 5332 [P.O.:720; I.V.:3962; IV Piggyback:650]  Out: -   No intake/output data recorded.    Lab Results and Cultures:   Lab Results   Component Value Date    WBC 7.47 04/30/2024    HGB 9.5 (L) 04/30/2024    HCT 29.8 (L) 04/30/2024    MCV 95 04/30/2024     04/30/2024     Lab Results   Component Value Date    CALCIUM 8.4 04/30/2024     11/13/2017    K 3.6 04/30/2024    CO2 28 04/30/2024     04/30/2024    BUN 17 04/30/2024    CREATININE 0.83 04/30/2024     Lab Results   Component Value Date    INR 1.06 " 04/30/2024    INR 1.08 04/22/2024    INR 1.49 (H) 07/21/2023    PROTIME 14.0 04/30/2024    PROTIME 14.2 04/22/2024    PROTIME 18.0 (H) 07/21/2023     Urinalysis:   Lab Results   Component Value Date    COLORU Dark Yellow 04/22/2024    CLARITYU Clear 04/22/2024    SPECGRAV 1.009 04/22/2024    PHUR 6.5 04/22/2024    LEUKOCYTESUR Negative 04/22/2024    NITRITE Negative 04/22/2024    GLUCOSEU Negative 04/22/2024    KETONESU Negative 04/22/2024    BILIRUBINUR Negative 04/22/2024    BLOODU Negative 04/22/2024     Urine Culture:   Lab Results   Component Value Date    URINECX No Growth <1000 cfu/mL 04/22/2024       Medications:  Current Facility-Administered Medications   Medication Dose Route Frequency    acetaminophen (TYLENOL) tablet 975 mg  975 mg Oral Q8H BONIFACIO    amiodarone tablet 100 mg  100 mg Oral Daily    aspirin chewable tablet 81 mg  81 mg Oral Daily    clopidogrel (PLAVIX) tablet 75 mg  75 mg Oral Daily    diltiazem (CARDIZEM CD) 24 hr capsule 120 mg  120 mg Oral Daily    docusate sodium (COLACE) capsule 100 mg  100 mg Oral Daily    fluticasone-vilanterol 200-25 mcg/actuation 1 puff  1 puff Inhalation Daily    furosemide (LASIX) tablet 40 mg  40 mg Oral Daily    heparin (porcine) subcutaneous injection 5,000 Units  5,000 Units Subcutaneous Q8H BONIFACIO    labetalol (NORMODYNE) injection 5 mg  5 mg Intravenous Q15 Min PRN    And    hydrALAZINE (APRESOLINE) injection 15 mg  15 mg Intravenous Q15 Min PRN    And    niCARdipine (CARDENE) 25 mg (STANDARD CONCENTRATION) in sodium chloride 0.9% 250 mL  1-15 mg/hr Intravenous Continuous PRN    LORazepam (ATIVAN) tablet 1 mg  1 mg Oral TID    LORazepam (ATIVAN) tablet 2 mg  2 mg Oral Q8H PRN    methocarbamol (ROBAXIN) tablet 500 mg  500 mg Oral TID    metoprolol succinate (TOPROL-XL) 24 hr tablet 100 mg  100 mg Oral BID    ondansetron (ZOFRAN) injection 4 mg  4 mg Intravenous Q6H PRN    oxyCODONE (ROXICODONE) IR tablet 5 mg  5 mg Oral Q6H PRN    oxyCODONE (ROXICODONE) split  tablet 2.5 mg  2.5 mg Oral Q6H PRN    pantoprazole (PROTONIX) EC tablet 20 mg  20 mg Oral Daily Before Breakfast    sodium chloride 0.9 % bolus 500 mL  500 mL Intravenous Once PRN    Followed by    phenylephrine (LEXUS-SYNEPHRINE) 50 mg (STANDARD CONCENTRATION) in sodium chloride 0.9% 250 mL   mcg/min Intravenous Continuous PRN    polyethylene glycol (MIRALAX) packet 17 g  17 g Oral Daily    potassium chloride (Klor-Con M20) CR tablet 40 mEq  40 mEq Oral Once    senna (SENOKOT) tablet 8.6 mg  1 tablet Oral Daily    spironolactone (ALDACTONE) tablet 25 mg  25 mg Oral BID       Physical Exam:    General appearance: alert and oriented, in no acute distress  Skin:  skin color, texture, turgor normal; L neck incision  Neurologic: Grossly normal; mild L tongue deviation  Head: Normocephalic, without obvious abnormality, atraumatic  Lungs: clear to auscultation bilaterally  Heart: regular rate and rhythm, S1, S2 normal, no murmur, click, rub or gallop and regular rate and rhythm  Abdomen:  soft, non-tender  Extremities: extremities normal, warm and well-perfused; no cyanosis, clubbing, or edema    Wound/Incision:  L neck incision well-approx, no swelling, erythema or drainage; surgical glue closure.    Pulse exam:  Radial: Right: 2+ Left:: 2+  DP: Right: 2+ Left: 2+      JUD Luna  4/30/2024

## 2024-05-01 ENCOUNTER — TELEPHONE (OUTPATIENT)
Age: 64
End: 2024-05-01

## 2024-05-01 LAB
ANION GAP SERPL CALCULATED.3IONS-SCNC: 4 MMOL/L (ref 4–13)
BASOPHILS # BLD AUTO: 0.03 THOUSANDS/ÂΜL (ref 0–0.1)
BASOPHILS NFR BLD AUTO: 1 % (ref 0–1)
BUN SERPL-MCNC: 13 MG/DL (ref 5–25)
CALCIUM SERPL-MCNC: 8.6 MG/DL (ref 8.4–10.2)
CHLORIDE SERPL-SCNC: 105 MMOL/L (ref 96–108)
CO2 SERPL-SCNC: 31 MMOL/L (ref 21–32)
CREAT SERPL-MCNC: 0.78 MG/DL (ref 0.6–1.3)
EOSINOPHIL # BLD AUTO: 0.34 THOUSAND/ÂΜL (ref 0–0.61)
EOSINOPHIL NFR BLD AUTO: 6 % (ref 0–6)
ERYTHROCYTE [DISTWIDTH] IN BLOOD BY AUTOMATED COUNT: 12.8 % (ref 11.6–15.1)
GFR SERPL CREATININE-BSD FRML MDRD: 79 ML/MIN/1.73SQ M
GLUCOSE SERPL-MCNC: 91 MG/DL (ref 65–140)
HCT VFR BLD AUTO: 29.2 % (ref 34.8–46.1)
HGB BLD-MCNC: 9.3 G/DL (ref 11.5–15.4)
IMM GRANULOCYTES # BLD AUTO: 0.03 THOUSAND/UL (ref 0–0.2)
IMM GRANULOCYTES NFR BLD AUTO: 1 % (ref 0–2)
LYMPHOCYTES # BLD AUTO: 1.8 THOUSANDS/ÂΜL (ref 0.6–4.47)
LYMPHOCYTES NFR BLD AUTO: 34 % (ref 14–44)
MAGNESIUM SERPL-MCNC: 2.1 MG/DL (ref 1.9–2.7)
MCH RBC QN AUTO: 30.6 PG (ref 26.8–34.3)
MCHC RBC AUTO-ENTMCNC: 31.8 G/DL (ref 31.4–37.4)
MCV RBC AUTO: 96 FL (ref 82–98)
MONOCYTES # BLD AUTO: 0.44 THOUSAND/ÂΜL (ref 0.17–1.22)
MONOCYTES NFR BLD AUTO: 8 % (ref 4–12)
NEUTROPHILS # BLD AUTO: 2.65 THOUSANDS/ÂΜL (ref 1.85–7.62)
NEUTS SEG NFR BLD AUTO: 50 % (ref 43–75)
NRBC BLD AUTO-RTO: 0 /100 WBCS
PHOSPHATE SERPL-MCNC: 3.3 MG/DL (ref 2.3–4.1)
PLATELET # BLD AUTO: 182 THOUSANDS/UL (ref 149–390)
PMV BLD AUTO: 8.6 FL (ref 8.9–12.7)
POTASSIUM SERPL-SCNC: 4.3 MMOL/L (ref 3.5–5.3)
RBC # BLD AUTO: 3.04 MILLION/UL (ref 3.81–5.12)
SODIUM SERPL-SCNC: 140 MMOL/L (ref 135–147)
WBC # BLD AUTO: 5.29 THOUSAND/UL (ref 4.31–10.16)

## 2024-05-01 PROCEDURE — 85025 COMPLETE CBC W/AUTO DIFF WBC: CPT

## 2024-05-01 PROCEDURE — 84100 ASSAY OF PHOSPHORUS: CPT

## 2024-05-01 PROCEDURE — 99223 1ST HOSP IP/OBS HIGH 75: CPT

## 2024-05-01 PROCEDURE — 83735 ASSAY OF MAGNESIUM: CPT

## 2024-05-01 PROCEDURE — 99024 POSTOP FOLLOW-UP VISIT: CPT | Performed by: SURGERY

## 2024-05-01 PROCEDURE — 99232 SBSQ HOSP IP/OBS MODERATE 35: CPT | Performed by: INTERNAL MEDICINE

## 2024-05-01 PROCEDURE — 80048 BASIC METABOLIC PNL TOTAL CA: CPT

## 2024-05-01 RX ADMIN — DILTIAZEM HYDROCHLORIDE 120 MG: 120 CAPSULE, COATED, EXTENDED RELEASE ORAL at 08:26

## 2024-05-01 RX ADMIN — ACETAMINOPHEN 325MG 975 MG: 325 TABLET ORAL at 21:45

## 2024-05-01 RX ADMIN — PANTOPRAZOLE SODIUM 20 MG: 20 TABLET, DELAYED RELEASE ORAL at 06:06

## 2024-05-01 RX ADMIN — FUROSEMIDE 40 MG: 40 TABLET ORAL at 08:26

## 2024-05-01 RX ADMIN — METHOCARBAMOL 500 MG: 500 TABLET ORAL at 16:09

## 2024-05-01 RX ADMIN — ACETAMINOPHEN 325MG 975 MG: 325 TABLET ORAL at 05:50

## 2024-05-01 RX ADMIN — ASPIRIN 81 MG CHEWABLE TABLET 81 MG: 81 TABLET CHEWABLE at 08:25

## 2024-05-01 RX ADMIN — METOPROLOL SUCCINATE 100 MG: 100 TABLET, EXTENDED RELEASE ORAL at 08:29

## 2024-05-01 RX ADMIN — CLOPIDOGREL BISULFATE 75 MG: 75 TABLET ORAL at 08:26

## 2024-05-01 RX ADMIN — APIXABAN 5 MG: 5 TABLET, FILM COATED ORAL at 17:49

## 2024-05-01 RX ADMIN — LORAZEPAM 1 MG: 1 TABLET ORAL at 08:25

## 2024-05-01 RX ADMIN — OXYCODONE 5 MG: 5 TABLET ORAL at 10:15

## 2024-05-01 RX ADMIN — METHOCARBAMOL 500 MG: 500 TABLET ORAL at 08:26

## 2024-05-01 RX ADMIN — FLUTICASONE FUROATE AND VILANTEROL TRIFENATATE 1 PUFF: 200; 25 POWDER RESPIRATORY (INHALATION) at 08:27

## 2024-05-01 RX ADMIN — LORAZEPAM 2 MG: 1 TABLET ORAL at 01:04

## 2024-05-01 RX ADMIN — LORAZEPAM 1 MG: 1 TABLET ORAL at 16:08

## 2024-05-01 RX ADMIN — ONDANSETRON 4 MG: 4 TABLET, ORALLY DISINTEGRATING ORAL at 05:50

## 2024-05-01 RX ADMIN — AMIODARONE HYDROCHLORIDE 100 MG: 100 TABLET ORAL at 08:26

## 2024-05-01 RX ADMIN — Medication 2.5 MG: at 20:07

## 2024-05-01 RX ADMIN — SPIRONOLACTONE 25 MG: 25 TABLET ORAL at 17:49

## 2024-05-01 RX ADMIN — METOPROLOL SUCCINATE 100 MG: 100 TABLET, EXTENDED RELEASE ORAL at 17:49

## 2024-05-01 RX ADMIN — SPIRONOLACTONE 25 MG: 25 TABLET ORAL at 08:26

## 2024-05-01 RX ADMIN — METHOCARBAMOL 500 MG: 500 TABLET ORAL at 21:45

## 2024-05-01 RX ADMIN — HEPARIN SODIUM 5000 UNITS: 5000 INJECTION INTRAVENOUS; SUBCUTANEOUS at 05:50

## 2024-05-01 RX ADMIN — Medication 2.5 MG: at 06:31

## 2024-05-01 RX ADMIN — LORAZEPAM 2 MG: 1 TABLET ORAL at 12:33

## 2024-05-01 RX ADMIN — LORAZEPAM 1 MG: 1 TABLET ORAL at 21:45

## 2024-05-01 RX ADMIN — ACETAMINOPHEN 325MG 975 MG: 325 TABLET ORAL at 13:47

## 2024-05-01 NOTE — CONSULTS
Consultation - Cardiology   Nancy Jones 64 y.o. female MRN: [id number]  Unit/Bed#: ICU 04 Encounter: [id number]        Inpatient consult to Cardiology     Date/Time  5/1/2024 9:09 AM     Performed by  Mckay Dumont MD   Authorized by  JUD Helton             History of Present Illness   Physician Requesting Consult: Roberto García DO  Reason for Consult / Principal Problem: Hypertension      Assessment:  Principal Problem:    Stenosis of left carotid artery  Active Problems:    Essential hypertension    Gastroesophageal reflux disease without esophagitis    A-fib ([facility])    CAMILLE (obstructive sleep apnea)    COPD (chronic obstructive pulmonary disease) ([facility])      Assessment/ Plan:    Essential hypertension  Multiple drug allergies as mentioned below  Antihypertensive regimen: Toprol- mg twice daily, spironolactone 25 mg twice daily, diltiazem  mg daily  Has been relatively well-controlled with current medications in the outpatient setting  Currently status post TCAR, received IV phenylephrine briefly to maintain normotension    Paroxysmal atrial fibrillation  Diagnosed in March 2020  Has been on Toprol-XL and diltiazem   Anticoagulation Eliquis 5 mg twice daily  Postop A-fib with RVR in July 2023, when amiodarone was added  Recently amiodarone was decreased from 200 mg daily to 100 mg daily    Status post left TCAR postop day 2  CAMILLE  COPD  Vascular disease    Plan   Patient received her morning blood pressure medications, and is maintaining blood pressure at 120s over 60s.  Will continue metoprolol succinate 100 mg twice daily and diltiazem  mg daily as these are helping with her atrial fibrillation as well.  Continue spironolactone 25 mg twice daily.  Monitor blood pressure for the next 24 hours, if patient becomes hypotensive, would recommend cutting back on spironolactone  Continue amiodarone 100 mg daily.  Can likely discontinue later in the outpatient setting, however as  patient is currently postop, and has a hernia repair in the future, would continue amiodarone due to high risk of post op atrial fibrillation as she did previously  Eliquis 5 mg twice daily, clopidogrel 75 mg daily, and Repatha  Outpatient follow-up with cardiology    HPI: Nancy Jones is a 64 y.o. year old female who has a history of paroxysmal atrial fibrillation since 2020, mild to moderate aortic regurgitation, hypertension with (angioedema/anaphylaxis to ACE inhibitor's, olmesartan, valsartan, hydralazine), CAMILLE, TIA, carotid artery disease, history of colonic ischemia status post left hemicolectomy in July 2023, and COPD, with Dr. Lynn in the outpatient setting.  She presented on 4/29/2024 for an elective left-sided TCAR.  Postop course has been uneventful.  She required postop phenylephrine to maintain target blood pressure.  Today's postop day 2.  Cardiology is consulted for management of her antihypertensives.    She has a history of longstanding essential hypertension dismal atrial fibrillation, maintained on Toprol- mg twice daily, spironolactone 25 mg twice daily, Cardizem 120 mg daily. Patient had a hospital admission in July 2023 for ischemic colitis and underwent left hemicolectomy.  Postop developed A-fib with RVR, which was not fully controlled with current medications, she was loaded with amiodarone and was discharged home on amiodarone 200 mg daily.  Recently she had a ZIO monitor which showed no evidence of atrial fibrillation.  Amiodarone has been increased to 100 mg daily since April 2024.  She has been tolerating her current medications well.  Prior history of allergies to ACE inhibitors, olmesartan, valsartan hydralazine with angioedema/anaphylaxis.  Some of these were confirmed by allergy testing.  Patient also mentions that she is allergic to Norvasc.    Review of Systems   Constitutional: Positive for malaise/fatigue. Negative for decreased appetite.   HENT: Negative.     Eyes:  "Negative.    Cardiovascular:  Negative for chest pain, dyspnea on exertion, leg swelling, near-syncope, orthopnea, palpitations and paroxysmal nocturnal dyspnea.   Respiratory:  Negative for shortness of breath.    Endocrine: Negative.    Hematologic/Lymphatic: Negative.    Musculoskeletal: Negative.    Gastrointestinal: Negative.  Negative for abdominal pain, melena, nausea and vomiting.   Neurological: Negative.  Negative for dizziness and light-headedness.   Psychiatric/Behavioral: Negative.     All other systems reviewed and are negative.    Historical Information   Past Medical History:   Diagnosis Date    A-fib ([facility]) 03/2020    Allergic     Anxiety     Arthritis     Asthma     Back pain     and muscle pain    Bruises easily     Chronic pain disorder     Interstitial pain    Colon polyp     Dental bridge present     Depression     Eczema     GERD (gastroesophageal reflux disease)     Heart murmur     History of blood clots     per pt \"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\"    History of pneumonia     Hives     Hyperlipidemia     Hypertension     Infertility, female     Interstitial cystitis     Migraine     sees neurologist    Motion sickness     Obesity     Palpitations     PONV (postoperative nausea and vomiting)     Premature atrial contractions 11/09/2022    Psychiatric disorder     TIA (transient ischemic attack) 09/2022    per pt --\"had MRI and 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\"    Urinary incontinence     wears Pads    Venous insufficiency 08/01/2017    Wears glasses      Past Surgical History:   Procedure Laterality Date    COLONOSCOPY      DILATION AND CURETTAGE OF UTERUS      EGD      EXPLORATORY LAPAROTOMY      for infertility    EXPLORATORY LAPAROTOMY W/ BOWEL RESECTION N/A 7/19/2023    Procedure: LAPAROTOMY EXPLORATORY W/ BOWEL RESECTION;  Surgeon: Crista Recinos MD;  Location: [facility];  Service: General    HAND SURGERY      Hand excision of tendon cyst    NY LAPAROSCOPIC " APPENDECTOMY N/A 2022    Procedure: APPENDECTOMY LAPAROSCOPIC;  Surgeon: Vin Fields MD;  Location: [facility];  Service: General    WA LAPS ABD PRTM&OMENTUM DX W/WO SPEC BR/WA SPX N/A 2023    Procedure: LAPAROSCOPY DIAGNOSTIC, LYSIS OF ADHESIONS, LAPAROSCOPY TAKE TAKEDOWN OF LEFT COLON, EXPLORATORY LAPAROSCOPY, LYSIS OF ADHESIONS, RESECTION OF TRANSVERSE COLON SPLENIC FLEXURE AND DESCENDING COLON , TAKE DOWN OF SPLENIC FLEXURE, SIGMOIDOSCOPY, MOBILIZATION OF RIGHT COLON, PRIMARY ANASTOMOSIS EEA 29, TAP BLOCK;  Surgeon: Crista Recinos MD;  Location[facility];  Service: General    WA TCAT IV STENT CRV CRTD ART EMBOLIC PROTECJ Left 2024    Procedure: ANGIOPLASTY ARTERY CAROTID W/ STENT;  Surgeon: Roberto García DO;  [facility];  Service: Vascular    WA TEAEC W/PATCH GRF CAROTID VERTB SUBCLAV NECK INC Left 2023    Procedure: EXPLORATION OF LEFT CAROTID ARTERY; ABORTED ENDARTERECTOMY ARTERY CAROTID;  Surgeon: Roberto García DO;  Location[facility];  Service: Vascular    SUPERIOR MESTENTERIC ARTERY STENT      WISDOM TOOTH EXTRACTION       Social History     Substance and Sexual Activity   Alcohol Use Not Currently     Social History     Substance and Sexual Activity   Drug Use No     Social History     Tobacco Use   Smoking Status Former    Current packs/day: 0.00    Average packs/day: 0.5 packs/day for 10.0 years (5.0 ttl pk-yrs)    Types: Cigarettes    Start date:     Quit date: 2019    Years since quittin.3    Passive exposure: Past   Smokeless Tobacco Never     Family History:   Family History   Problem Relation Age of Onset    Lung cancer Mother 60    Brain cancer Mother 60    Diabetes Father     Depression Father     Other Father         septic    Allergies Sister     Hashimoto's thyroiditis Sister     Abdominal aortic aneurysm Sister     Diabetes Sister     No Known Problems Sister     No Known Problems Maternal Grandmother     No Known Problems Maternal  Grandfather     No Known Problems Paternal Grandmother     No Known Problems Paternal Grandfather     Hodgkin's lymphoma Brother     No Known Problems Brother     No Known Problems Maternal Aunt     Diabetes Other     Breast cancer Neg Hx        Meds/Allergies   all current active meds have been reviewed, current meds:   Current Facility-Administered Medications   Medication Dose Route Frequency    acetaminophen (TYLENOL) tablet 975 mg  975 mg Oral Q8H BONIAFCIO    amiodarone tablet 100 mg  100 mg Oral Daily    apixaban (ELIQUIS) tablet 5 mg  5 mg Oral BID    clopidogrel (PLAVIX) tablet 75 mg  75 mg Oral Daily    diltiazem (CARDIZEM CD) 24 hr capsule 120 mg  120 mg Oral Daily    docusate sodium (COLACE) capsule 100 mg  100 mg Oral Daily    fluticasone-vilanterol 200-25 mcg/actuation 1 puff  1 puff Inhalation Daily    furosemide (LASIX) tablet 40 mg  40 mg Oral Daily    labetalol (NORMODYNE) injection 5 mg  5 mg Intravenous Q15 Min PRN    And    hydrALAZINE (APRESOLINE) injection 15 mg  15 mg Intravenous Q15 Min PRN    LORazepam (ATIVAN) tablet 1 mg  1 mg Oral TID    LORazepam (ATIVAN) tablet 2 mg  2 mg Oral Q8H PRN    methocarbamol (ROBAXIN) tablet 500 mg  500 mg Oral TID    metoprolol succinate (TOPROL-XL) 24 hr tablet 100 mg  100 mg Oral BID    ondansetron (ZOFRAN-ODT) dispersible tablet 4 mg  4 mg Oral Q6H PRN    oxyCODONE (ROXICODONE) IR tablet 5 mg  5 mg Oral Q6H PRN    oxyCODONE (ROXICODONE) split tablet 2.5 mg  2.5 mg Oral Q6H PRN    pantoprazole (PROTONIX) EC tablet 20 mg  20 mg Oral Daily Before Breakfast    phenylephrine (LEXUS-SYNEPHRINE) 50 mg (STANDARD CONCENTRATION) in sodium chloride 0.9% 250 mL   mcg/min Intravenous Continuous PRN    polyethylene glycol (MIRALAX) packet 17 g  17 g Oral Daily    senna (SENOKOT) tablet 8.6 mg  1 tablet Oral Daily    spironolactone (ALDACTONE) tablet 25 mg  25 mg Oral BID   , and PTA meds:   Prior to Admission Medications   Prescriptions Last Dose Informant Patient  Reported? Taking?   Diclofenac Sodium (VOLTAREN) 1 % 4/27/2024 Self No Yes   Sig: Apply 2 g topically 4 (four) times a day   Docusate Sodium (COLACE PO) 4/28/2024 Self Yes Yes   Sig: Take by mouth daily at bedtime   Eliquis 5 MG 4/26/2024 Self No Yes   Sig: TAKE ONE TABLET BY MOUTH TWICE DAILY   Evolocumab (Repatha SureClick) 140 MG/ML SOAJ Past Month Self No Yes   Sig: Inject 1 mL (140 mg total) under the skin every 14 (fourteen) days   LORazepam (ATIVAN) 1 mg tablet 4/29/2024  No Yes   Sig: Take 1 tablet (1 mg total) by mouth 3 (three) times a day   LORazepam (ATIVAN) 2 mg tablet  Self No No   Sig: Take 1 tablet (2 mg total) by mouth every 8 (eight) hours as needed for anxiety   acetaminophen (TYLENOL) 325 mg tablet 4/29/2024 at 0430 Self No Yes   Sig: Take 2 tablets (650 mg total) by mouth every 6 (six) hours   amiodarone 100 mg tablet 4/29/2024 at 0430 Self No Yes   Sig: Take 1 tablet (100 mg total) by mouth daily   aspirin 81 mg chewable tablet 4/29/2024 at 0430 Self No Yes   Sig: Chew 1 tablet (81 mg total) daily   chlordiazepoxide-clidinium (LIBRAX) 5-2.5 mg per capsule 4/28/2024 Self No Yes   Sig: Take 1 capsule by mouth daily as needed for cramping   clopidogrel (Plavix) 75 mg tablet 4/29/2024 Self No Yes   Sig: Take 1 tablet (75 mg total) by mouth daily   diltiazem (CARDIZEM CD) 120 mg 24 hr capsule 4/29/2024 Self No Yes   Sig: TAKE ONE CAPSULE BY MOUTH ONCE DAILY   fexofenadine (ALLEGRA) 180 MG tablet 4/15/2024 Self Yes No   Sig: Take 180 mg by mouth daily   fluticasone-vilanterol (BREO ELLIPTA) 200-25 MCG/INH inhaler 4/29/2024 Self No Yes   Sig: Inhale 1 puff daily Rinse mouth after use.   furosemide (LASIX) 40 mg tablet  Self No Yes   Sig: Take 1 tablet (40 mg total) by mouth daily   magnesium Oxide (MAG-OX) 400 mg TABS  Self No No   Sig: Take 1 tablet (400 mg total) by mouth daily at bedtime   Patient not taking: Reported on 4/10/2024   methocarbamol (ROBAXIN) 500 mg tablet 4/29/2024 Self No Yes   Sig:  Take 1 tablet (500 mg total) by mouth 3 (three) times a day   metoprolol succinate (TOPROL-XL) 100 mg 24 hr tablet 4/29/2024 at 0430 Self No Yes   Sig: Take 1 tablet (100 mg total) by mouth 2 (two) times a day   omeprazole (PriLOSEC) 40 MG capsule 4/29/2024 Self No Yes   Sig: Take 1 capsule (40 mg total) by mouth 2 (two) times a day 30 minutes before breakfast and dinner   ondansetron (ZOFRAN-ODT) 8 mg disintegrating tablet   No Yes   Sig: Take 1 tablet (8 mg total) by mouth every 8 (eight) hours as needed for nausea or vomiting   phenazopyridine (PYRIDIUM) 200 mg tablet 4/28/2024 Self No Yes   Sig: Take 1 tablet (200 mg total) by mouth 2 (two) times a day as needed for bladder spasms   promethazine (PHENERGAN) 25 mg tablet 4/29/2024 Self No Yes   Sig: Take 1 tablet (25 mg total) by mouth every 6 (six) hours as needed for nausea or vomiting   spironolactone (ALDACTONE) 25 mg tablet 4/29/2024  No Yes   Sig: TAKE ONE TABLET BY MOUTH TWICE DAILY   traMADol (ULTRAM) 50 mg tablet 4/29/2024  No Yes   Sig: Take 2 tablets (100 mg total) by mouth every 8 (eight) hours as needed for moderate pain   triamcinolone (KENALOG) 0.1 % cream  Self No Yes   Sig: Apply topically 2 (two) times a day      Facility-Administered Medications Last Administration Doses Remaining   cyanocobalamin injection 1,000 mcg 11/16/2023 11:51 AM         phenylephine,  mcg/min, Last Rate: Stopped (04/30/24 1700)        Allergies   Allergen Reactions    Ace Inhibitors Angioedema and Anaphylaxis     Anaphylaxis    Benicar [Olmesartan] Angioedema     See Allergy note from 9/11/2008.  Swollen ankles/legs    Hydralazine Other (See Comments)     Lip swelling  Flank pain    Other Anaphylaxis and Other (See Comments)     Preservatives- itching throat closes, hi  E Z Cat scan contrast - hives throat closes itching,  Preservatives- itching throat closes, hi  Artificial sweeteners    Valsartan Angioedema     Lips, face swollen    Ampicillin Hives     Depends  "on brand some preservatives can react. Has recently tolerated oral amoxicillin.    Sulfa Antibiotics Hives     stuffiness,itching,hives,throat closing--per pt \"sometimes able to take depending on the brand and the filler or possibly preservatives in it\"    Motrin [Ibuprofen] Other (See Comments)     Per pt \" told not to take due to A Fib\"    Wound Dressing Adhesive Other (See Comments)     Per pt Adhesive on EKG leads caused redness       Objective   Vitals: Blood pressure 159/57, pulse 90, temperature 97.9 °F (36.6 °C), temperature source Oral, resp. rate (!) 30, height 5' 4\" (1.626 m), weight 92.8 kg (204 lb 9.4 oz), SpO2 94%, not currently breastfeeding., Body mass index is 35.12 kg/m².,   Orthostatic Blood Pressures      Flowsheet Row Most Recent Value   Blood Pressure 159/57 filed at 2024 0818   Patient Position - Orthostatic VS Sitting filed at 2024 0818          Systolic (24hrs), Av , Min:96 , Max:159     Diastolic (24hrs), Av, Min:42, Max:70      Intake/Output Summary (Last 24 hours) at 2024 0909  Last data filed at 2024 0400  Gross per 24 hour   Intake 539.25 ml   Output --   Net 539.25 ml       Weight (last 2 days)       Date/Time Weight    24 1220 92.8 (204.59)    24 0637 87.4 (192.68)            Invasive Devices       Peripheral Intravenous Line  Duration             Peripheral IV 24 Right;Dorsal (posterior) Hand 2 days                        Physical Exam:   Physical Exam  Vitals and nursing note reviewed.   Constitutional:       General: She is not in acute distress.     Appearance: She is not ill-appearing.   HENT:      Head: Normocephalic.      Nose: Nose normal.   Eyes:      Pupils: Pupils are equal, round, and reactive to light.   Cardiovascular:      Pulses: Normal pulses.      Heart sounds: No murmur heard.     Comments: No JVD when sitting upright  Pulmonary:      Effort: Pulmonary effort is normal.      Breath sounds: Normal breath sounds. No " "wheezing or rales (crackles noted in the left lung base).   Musculoskeletal:      Cervical back: Normal range of motion.      Right lower leg: No edema.      Left lower leg: No edema.   Skin:     General: Skin is warm.      Capillary Refill: Capillary refill takes less than 2 seconds.   Neurological:      General: No focal deficit present.      Mental Status: She is alert and oriented to person, place, and time.   Psychiatric:         Mood and Affect: Mood normal.           Laboratory Results:        CBC with diff:   Results from last 7 days   Lab Units 05/01/24 0530 04/30/24 0429 04/29/24  1710   WBC Thousand/uL 5.29 7.47 7.72   HEMOGLOBIN g/dL 9.3* 9.5* 10.3*   HEMATOCRIT % 29.2* 29.8* 30.9*   MCV fL 96 95 93   PLATELETS Thousands/uL 182 198 194   RBC Million/uL 3.04* 3.13* 3.34*   MCH pg 30.6 30.4 30.8   MCHC g/dL 31.8 31.9 33.3   RDW % 12.8 12.8 12.5   MPV fL 8.6* 8.7* 8.7*   NRBC AUTO /100 WBCs 0  --  0         CMP:  Results from last 7 days   Lab Units 05/01/24 0530 04/30/24 0429 04/29/24  1710   POTASSIUM mmol/L 4.3 3.6 4.0   CHLORIDE mmol/L 105 104 103   CO2 mmol/L 31 28 26   BUN mg/dL 13 17 20   CREATININE mg/dL 0.78 0.83 1.09   CALCIUM mg/dL 8.6 8.4 8.5   AST U/L  --   --  14   ALT U/L  --   --  13   ALK PHOS U/L  --   --  60   EGFR ml/min/1.73sq m 79 73 53         BMP:  Results from last 7 days   Lab Units 05/01/24 0530 04/30/24 0429 04/29/24  1710   POTASSIUM mmol/L 4.3 3.6 4.0   CHLORIDE mmol/L 105 104 103   CO2 mmol/L 31 28 26   BUN mg/dL 13 17 20   CREATININE mg/dL 0.78 0.83 1.09   CALCIUM mg/dL 8.6 8.4 8.5       BNP:  No results for input(s): \"BNP\" in the last 72 hours.    Magnesium:   Results from last 7 days   Lab Units 05/01/24  0530 04/30/24  0429 04/29/24  1710   MAGNESIUM mg/dL 2.1 2.2 1.7*       Coags:   Results from last 7 days   Lab Units 04/30/24  0429   PTT seconds 29   INR  1.06       TSH:       Hemoglobin A1C       Lipid Profile:         Cardiac testing:   No results found for this " or any previous visit.    No results found for this or any previous visit.    No results found for this or any previous visit.    No results found for this or any previous visit.        Imaging: I have personally reviewed pertinent reports.    MRI knee left  wo contrast    Result Date: 4/28/2024  Narrative: MRI LEFT KNEE INDICATION:   M25.562: Pain in left knee. COMPARISON: Correlation is made with the prior radiograph dated 2/27/2024. TECHNIQUE:    Multiplanar/multisequence MR of the left knee was performed. Imaging performed on 1.5T MRI FINDINGS: SUBCUTANEOUS TISSUES: Normal JOINT EFFUSION: There is a small joint effusion. BAKER'S CYST: Small to moderate-sized Baker's cyst. MENISCI: There is a complex predominantly horizontal tear through the body of the lateral meniscus extending to its junction with the anterior horn. The medial meniscus is intact. CRUCIATE LIGAMENTS: Intact. EXTENSOR APPARATUS: Intact. COLLATERAL LIGAMENTS: There is minimal edema surrounding the fibers of the medial collateral ligament suggestive of a mild grade 1 sprain. The lateral collateral ligamentous complex is intact ARTICULAR SURFACES: Mild patella cartilage thinning. BONES: Mild marrow edema within the lateral femoral condyle and lateral tibial plateau may reflect contusion or stress response. MUSCULATURE:  Intact.     Impression: Complex predominantly horizontal tear through the body of the lateral meniscus extending to its junction with the anterior horn. Minimal edema surround the fibers of the medial collateral ligament suggestive of a mild grade 1 sprain. Mild marrow edema lateral femoral condyle and lateral tibial plateau may reflect contusion or stress response. Small joint effusion and small to moderate sized Baker's cyst. Workstation performed: [id number]     CT abdomen pelvis w contrast    Result Date: 4/23/2024  Narrative: CT ABDOMEN AND PELVIS WITH IV CONTRAST INDICATION:   Ventral hernia without obstruction or gangrene.   COMPARISON: 12/14/2023 TECHNIQUE:  CT examination of the abdomen and pelvis was performed. Multiplanar 2D reformatted images were created from the source data. This examination, like all CT scans performed in the [facility], was performed utilizing techniques to minimize radiation dose exposure, including the use of iterative reconstruction and automated exposure control. Radiation dose length product (DLP) for this visit: IV Contrast:  iohexol (OMNIPAQUE) 350 MG/ML injection (SINGLE-DOSE) 100 mL - Enteric Contrast:  Enteric contrast was not administered. FINDINGS: ABDOMEN LOWER CHEST: No clinically significant abnormality in the visualized lower chest. LIVER/BILIARY TREE: Hepatic steatosis. No suspicious mass. Normal hepatic contours. No biliary dilation. GALLBLADDER: No calcified gallstones. No pericholecystic inflammatory change. SPLEEN: Unremarkable. PANCREAS: Unremarkable. ADRENAL GLANDS: Unremarkable. KIDNEYS/URETERS: Unremarkable. No hydronephrosis. STOMACH AND BOWEL: Right hemicolectomy. Partial transverse colectomy. Normal caliber small bowel loops. Moderate colonic stool. APPENDIX: No findings to suggest appendicitis. ABDOMINOPELVIC CAVITY: No ascites. No pneumoperitoneum. No lymphadenopathy. VESSELS: Atherosclerosis without abdominal aortic aneurysm. PELVIS REPRODUCTIVE ORGANS: Unremarkable for patient's age. URINARY BLADDER: Unremarkable. ABDOMINAL WALL/INGUINAL REGIONS: Midline scar from prior laparotomy. Redemonstration of 3 midline ventral abdominal wall incisional hernias. Bowel containing umbilical hernia with fascial defect measuring 7.1 cm image 111 series 302. Additional fat-containing supraumbilical incisional hernia just above the umbilicus with fascial defect measuring 4.6 cm. Fat-containing supraumbilical hernia approximately 10 cm above the umbilicus with fascial defect measuring 4 cm. BONES: No acute fracture or suspicious osseous lesion.     Impression: Redemonstration of  3 midline ventral abdominal wall incisional hernias largest containing bowel at the level of the umbilicus with fascial defect measuring 7.1 cm. 2 additional fat-containing supraumbilical hernias just above the umbilicus and approximately 10 cm above the umbilicus are relatively stable compared to previous study. Right hemicolectomy and partial transverse colectomy. No intra-abdominal or pelvic lymphadenopathy. No hepatic lesions. Mild hepatic steatosis Electronically signed: 04/23/2024 11:07 PM Bharathi Fleming MD    CTA head and neck w wo contrast    Result Date: 4/23/2024  Narrative: CTA NECK AND BRAIN WITH AND WITHOUT CONTRAST INDICATION: I65.22: Occlusion and stenosis of left carotid artery COMPARISON:   Prior CTA dated February 20, 2023 TECHNIQUE:  Routine CT imaging of the Brain without contrast.  Post contrast imaging was performed after administration of iodinated contrast through the neck and brain. Post contrast axial 0.625 mm images timed to opacify the arterial system. 3D rendering was performed on an independent workstation.   MIP reconstructions performed. Coronal reconstructions were performed of the noncontrast portion of the brain. Radiation dose length product (DLP) for this visit:  1400 mGy-cm .  This examination, like all CT scans performed in the [facility], was performed utilizing techniques to minimize radiation dose exposure, including the use of iterative reconstruction and automated exposure control. IV Contrast:  100 mL of iohexol (OMNIPAQUE) IMAGE QUALITY:   Diagnostic FINDINGS: NONCONTRAST BRAIN PARENCHYMA:  No intracranial mass, mass effect or midline shift. No CT signs of acute infarction.  No acute parenchymal hemorrhage. VENTRICLES AND EXTRA-AXIAL SPACES:  Normal for the patient's age. VISUALIZED ORBITS: Normal visualized orbits. PARANASAL SINUSES: Normal visualized paranasal sinuses. CERVICAL VASCULATURE AORTIC ARCH AND GREAT VESSELS: Atheromatous plaque results in  mild subclavian stenosis on the left. RIGHT VERTEBRAL ARTERY CERVICAL SEGMENT:  Normal origin. The vessel is normal in caliber throughout the neck. 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 The vessel is tiny in size not visualized from  the C2 through the C1 level reconstituting likely via retrograde flow at this level. RIGHT EXTRACRANIAL CAROTID SEGMENT: Mild atherosclerotic disease of the distal common carotid artery and proximal cervical internal carotid artery without significant stenosis compared to the more distal ICA. LEFT EXTRACRANIAL CAROTID SEGMENT: Severe atherosclerotic disease of the bifurcation. There is severe short segment stenosis origin of the left internal carotid artery with metallic clips adjacent from prior endarterectomy. The ICA remains patent. NASCET criteria was used to determine the degree of internal carotid artery diameter stenosis. INTRACRANIAL VASCULATURE INTERNAL CAROTID ARTERIES: Moderate stenosis left ophthalmic segment of the ICA. Bilateral internal carotid arteries are patent to the carotid terminus. ANTERIOR CIRCULATION:  Symmetric A1 segments and anterior cerebral arteries with normal enhancement.  Normal anterior communicating artery. MIDDLE CEREBRAL ARTERY CIRCULATION:  M1 segment and middle cerebral artery branches demonstrate normal enhancement bilaterally. DISTAL VERTEBRAL ARTERIES:  Normal distal vertebral arteries.  Posterior inferior cerebellar artery origins are normal. Normal vertebral basilar junction. BASILAR ARTERY:  Basilar artery is normal in caliber.  Normal superior cerebellar arteries. POSTERIOR CEREBRAL ARTERIES: Both posterior cerebral arteries arises from the basilar tip.  Both arteries demonstrate normal enhancement.   Normal posterior communicating arteries. VENOUS STRUCTURES:  Normal. NON  VASCULAR ANATOMY BONY STRUCTURES:  No acute osseous abnormality. SOFT TISSUES OF THE NECK:  Unremarkable. THORACIC INLET:  Normal.     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 The vessel is not clearly identified in its distal V2 and V3 segments reconstituting at the distal V3/V4 segment likely via retrograde flow. Severe focal left ICA stenosis at its origin unchanged from prior study with evidence of prior endarterectomy. Moderate focal left ICA stenosis intracranially at its ophthalmic segment. Workstation performed: [id number]       EKG reviewed personally: normal sinus rhythm, LVH, left axis deviation  Telemetry reviewed personally: normal sinus rhythm, no events          Code Status: Prior

## 2024-05-01 NOTE — PLAN OF CARE
Problem: Prexisting or High Potential for Compromised Skin Integrity  Goal: Skin integrity is maintained or improved  Description: INTERVENTIONS:  - Identify patients at risk for skin breakdown  - Assess and monitor skin integrity  - Assess and monitor nutrition and hydration status  - Monitor labs   - Assess for incontinence   - Turn and reposition patient  - Assist with mobility/ambulation  - Relieve pressure over bony prominences  - Avoid friction and shearing  - Provide appropriate hygiene as needed including keeping skin clean and dry  - Evaluate need for skin moisturizer/barrier cream  - Collaborate with interdisciplinary team   - Patient/family teaching  - Consider wound care consult   Outcome: Adequate for Discharge     Problem: PAIN - ADULT  Goal: Verbalizes/displays adequate comfort level or baseline comfort level  Description: Interventions:  - Encourage patient to monitor pain and request assistance  - Assess pain using appropriate pain scale  - Administer analgesics based on type and severity of pain and evaluate response  - Implement non-pharmacological measures as appropriate and evaluate response  - Consider cultural and social influences on pain and pain management  - Notify physician/advanced practitioner if interventions unsuccessful or patient reports new pain  Outcome: Adequate for Discharge     Problem: INFECTION - ADULT  Goal: Absence or prevention of progression during hospitalization  Description: INTERVENTIONS:  - Assess and monitor for signs and symptoms of infection  - Monitor lab/diagnostic results  - Monitor all insertion sites, i.e. indwelling lines, tubes, and drains  - Monitor endotracheal if appropriate and nasal secretions for changes in amount and color  - [place] appropriate cooling/warming therapies per order  - Administer medications as ordered  - Instruct and encourage patient and family to use good hand hygiene technique  - Identify and instruct in appropriate isolation  precautions for identified infection/condition  Outcome: Adequate for Discharge  Goal: Absence of fever/infection during neutropenic period  Description: INTERVENTIONS:  - Monitor WBC    Outcome: Adequate for Discharge     Problem: SAFETY ADULT  Goal: Patient will remain free of falls  Description: INTERVENTIONS:  - Educate patient/family on patient safety including physical limitations  - Instruct patient to call for assistance with activity   - Consult OT/PT to assist with strengthening/mobility   - Keep Call bell within reach  - Keep bed low and locked with side rails adjusted as appropriate  - Keep care items and personal belongings within reach  - Initiate and maintain comfort rounds  - Make Fall Risk Sign visible to staff  - Offer Toileting every 2 Hours, in advance of need  - Initiate/Maintain bedalarm  - Obtain necessary fall risk management equipment:   - Apply yellow socks and bracelet for high fall risk patients  - Consider moving patient to room near nurses station  Outcome: Adequate for Discharge  Goal: Maintain or return to baseline ADL function  Description: INTERVENTIONS:  -  Assess patient's ability to carry out ADLs; assess patient's baseline for ADL function and identify physical deficits which impact ability to perform ADLs (bathing, care of mouth/teeth, toileting, grooming, dressing, etc.)  - Assess/evaluate cause of self-care deficits   - Assess range of motion  - Assess patient's mobility; develop plan if impaired  - Assess patient's need for assistive devices and provide as appropriate  - Encourage maximum independence but intervene and supervise when necessary  - Involve family in performance of ADLs  - Assess for home care needs following discharge   - Consider OT consult to assist with ADL evaluation and planning for discharge  - Provide patient education as appropriate  Outcome: Adequate for Discharge  Goal: Maintains/Returns to pre admission functional level  Description:  INTERVENTIONS:  - Perform AM-PAC 6 Click Basic Mobility/ Daily Activity assessment daily.  - Set and communicate daily mobility goal to care team and patient/family/caregiver.   - Collaborate with rehabilitation services on mobility goals if consulted  - Perform Range of Motion 4 times a day.  - Reposition patient every 2 hours.  - Dangle patient 3 times a day  - Stand patient 3 times a day  - Ambulate patient 3 times a day  - Out of bed to chair 3 times a day   - Out of bed for meals 3 times a day  - Out of bed for toileting  - Record patient progress and toleration of activity level   Outcome: Adequate for Discharge     Problem: DISCHARGE PLANNING  Goal: Discharge to home or other facility with appropriate resources  Description: INTERVENTIONS:  - Identify barriers to discharge w/patient and caregiver  - Arrange for needed discharge resources and transportation as appropriate  - Identify discharge learning needs (meds, wound care, etc.)  - Arrange for interpretive services to assist at discharge as needed  - Refer to Case Management Department for coordinating discharge planning if the patient needs post-hospital services based on physician/advanced practitioner order or complex needs related to functional status, cognitive ability, or social support system  Outcome: Adequate for Discharge     Problem: Knowledge Deficit  Goal: Patient/family/caregiver demonstrates understanding of disease process, treatment plan, medications, and discharge instructions  Description: Complete learning assessment and assess knowledge base.  Interventions:  - Provide teaching at level of understanding  - Provide teaching via preferred learning methods  Outcome: Adequate for Discharge

## 2024-05-01 NOTE — ASSESSMENT & PLAN NOTE
-Asymptomatic Left carotid stenosis  -S/P TCAR, left neck and right groin access site.  -A line in place for BP monitoring.  -Weaned off of darron around 5 pm yesterday evening. BP are stable with SBP between 106-120's    Plan:  Patient stable for downgrade out of the ICU, discharge planning as per primary team.  Continue neurovascular checks.  Monitor BP, systolic BP goal of 100-160  Continue Aspirin, Plavix and statin.  Eliquis currently on hold as per primary team.

## 2024-05-01 NOTE — PROGRESS NOTES
[facility]  Progress Note  Name: Nancy Jones I  MRN: [id number]  Unit/Bed#: ICU 04 I Date of Admission: 4/29/2024   Date of Service: 5/1/2024 I Hospital Day: 2    Assessment/Plan   * Stenosis of left carotid artery  Assessment & Plan  63 yo female ex-smoker w/ a hx of PAF, HTN, familial hypercholesterolemia, aortic regurg, obesity, CAMILLE on CPAP, ventral hernia, GERD and IBS, presented to [facility] on 4/29/24 for a scheduled TCAR. Pt has a hx of an aborted L CEA in 1/2023 due to inability to attain adequate distal ICA exposure to allow procedure to be performed safely. Imaging showed persistent high-grade/near occlusive L ICA stenosis for which pt underwent a L TCAR on 4/29/24. Overnight, pt remained relatively hypotensive w/ SBP 80-90s for which a darron drip was started.    POD #1: Relative hypotension continued w/ SBP  requiring darron drip which was able to be weaned off by 17:00.     POD #2: Pt remains hemodynamically stable w/ Hgb 9.3 and stable VS. SBP remains 100-110 without any home antihypertensive medication. Pt is eating, swallowing, voiding and ambulating ambulating without difficulty. L neck incision well-approx, no swelling, erythema or drainage; surgical glue closure. R groin site soft w/ a small scattered ecchymosis, no swelling erythema or drainage, open to air. Mild L tongue deviation improving. Pt remains neurologically intact w/ no unilateral deficits. Pt denies any unilateral numbness, tingling or weakness and denies any complaints at this time.    Plan:  -Asymptomatic L GILSON  -S/P L TCAR on 4/29 (POD #2)  -L neck incision well-approx, no erythema or drainage, surgical glue closure  -R groin site soft, no swelling or drainage  -Continue frequent neurovascular checks  -Continue diet as tolerated  -OOB as tolerated  -BP remains soft without any antihypertensive medications  -Consult placed to cardiology for recommendations for reintroduction of home antihypertensive  "meds  -D/C ASA  -Continue plavix for management of GILSON  -Resume eliquis for PAF this evening  -Continue repatha for familial hypercholesterolemia  -Plan to transfer to med surg this afternoon w/ possible discharge home tomorrow  -Follow up w/ vascular surgery in the office; appt scheduled for 5/13  -Will discuss w/ Dr. Julien      Subjective:  Pt seen for exam while sitting upright in bed; NAD. Pt denies any complaints at this time.    Vitals:  /57 (BP Location: Left arm)   Pulse 90   Temp 97.9 °F (36.6 °C) (Oral)   Resp (!) 30   Ht 5' 4\" (1.626 m)   Wt 92.8 kg (204 lb 9.4 oz)   LMP  (LMP Unknown)   SpO2 94%   BMI 35.12 kg/m²     I/Os:  I/O last 3 completed shifts:  In: 2263.7 [P.O.:480; I.V.:1133.7; IV Piggyback:650]  Out: -   No intake/output data recorded.    Lab Results and Cultures:   Lab Results   Component Value Date    WBC 5.29 05/01/2024    HGB 9.3 (L) 05/01/2024    HCT 29.2 (L) 05/01/2024    MCV 96 05/01/2024     05/01/2024     Lab Results   Component Value Date    CALCIUM 8.6 05/01/2024     11/13/2017    K 4.3 05/01/2024    CO2 31 05/01/2024     05/01/2024    BUN 13 05/01/2024    CREATININE 0.78 05/01/2024     Lab Results   Component Value Date    INR 1.06 04/30/2024    INR 1.08 04/22/2024    INR 1.49 (H) 07/21/2023    PROTIME 14.0 04/30/2024    PROTIME 14.2 04/22/2024    PROTIME 18.0 (H) 07/21/2023       Medications:  Current Facility-Administered Medications   Medication Dose Route Frequency    acetaminophen (TYLENOL) tablet 975 mg  975 mg Oral Q8H BONIFACIO    amiodarone tablet 100 mg  100 mg Oral Daily    apixaban (ELIQUIS) tablet 5 mg  5 mg Oral BID    clopidogrel (PLAVIX) tablet 75 mg  75 mg Oral Daily    diltiazem (CARDIZEM CD) 24 hr capsule 120 mg  120 mg Oral Daily    docusate sodium (COLACE) capsule 100 mg  100 mg Oral Daily    fluticasone-vilanterol 200-25 mcg/actuation 1 puff  1 puff Inhalation Daily    furosemide (LASIX) tablet 40 mg  40 mg Oral Daily    labetalol " (NORMODYNE) injection 5 mg  5 mg Intravenous Q15 Min PRN    And    hydrALAZINE (APRESOLINE) injection 15 mg  15 mg Intravenous Q15 Min PRN    LORazepam (ATIVAN) tablet 1 mg  1 mg Oral TID    LORazepam (ATIVAN) tablet 2 mg  2 mg Oral Q8H PRN    methocarbamol (ROBAXIN) tablet 500 mg  500 mg Oral TID    metoprolol succinate (TOPROL-XL) 24 hr tablet 100 mg  100 mg Oral BID    ondansetron (ZOFRAN-ODT) dispersible tablet 4 mg  4 mg Oral Q6H PRN    oxyCODONE (ROXICODONE) IR tablet 5 mg  5 mg Oral Q6H PRN    oxyCODONE (ROXICODONE) split tablet 2.5 mg  2.5 mg Oral Q6H PRN    pantoprazole (PROTONIX) EC tablet 20 mg  20 mg Oral Daily Before Breakfast    phenylephrine (LEXUS-SYNEPHRINE) 50 mg (STANDARD CONCENTRATION) in sodium chloride 0.9% 250 mL   mcg/min Intravenous Continuous PRN    polyethylene glycol (MIRALAX) packet 17 g  17 g Oral Daily    senna (SENOKOT) tablet 8.6 mg  1 tablet Oral Daily    spironolactone (ALDACTONE) tablet 25 mg  25 mg Oral BID       Physical Exam:    General appearance: alert and oriented, in no acute distress  Skin:  skin color, texture, turgor normal; L neck incision  Neurologic: Grossly normal; mild L tongue deviation improved  Head: Normocephalic, without obvious abnormality, atraumatic  Lungs: clear to auscultation bilaterally  Heart: regular rate and rhythm, S1, S2 normal, no murmur, click, rub or gallop  Abdomen:  soft, non-tender  Extremities: extremities normal, warm and well-perfused; no cyanosis, clubbing, or edema    Wound/Incision:  L neck incision well-approx, no swelling, erythema or drainage; surgical glue closure. R groin site soft w/ small scattered areas of ecchymosis, no swelling, erythema or drainage; open to air.    Pulse exam:  Radial: Right: 2+ Left:: 2+  DP: Right: 2+ Left: 2+      Italia Hogan, JUD  5/1/2024

## 2024-05-01 NOTE — PROGRESS NOTES
[facility]  Progress Note  Name: Nancy Jones I  MRN: [id number]  Unit/Bed#: ICU 04 I Date of Admission: 4/29/2024   Date of Service: 5/1/2024 I Hospital Day: 2    Assessment/Plan   * Stenosis of left carotid artery  Assessment & Plan  -Asymptomatic Left carotid stenosis  -S/P TCAR, left neck and right groin access site.  -A line in place for BP monitoring.  -Weaned off of darron around 5 pm yesterday evening. BP are stable with SBP between 106-120's    Plan:  Patient stable for downgrade out of the ICU, discharge planning as per primary team.  Continue neurovascular checks.  Monitor BP, systolic BP goal of 100-160  Continue Aspirin, Plavix and statin.  Eliquis currently on hold as per primary team.    Essential hypertension  Assessment & Plan  Home furosemide, spironolactone, diltiazem and metoprolol.  Patient's BP stable overnight, successfully weaned off of darron.      Continue home mediations with appropriate hold parameters.    A-fib ([facility])  Assessment & Plan  Home regimen amiodarone, metoprolol, diltiazem and Eliquis.    Plan:  Continue amiodarone and metoprolol and diltiazem.  Hold Eliquis for now as per vascular surgery.    COPD (chronic obstructive pulmonary disease) ([facility])  Assessment & Plan  Currently not in exacerbation.    Plan:   Continue home regimen Breo 1 puff daily, albuterol PRN      CAMILLE (obstructive sleep apnea)  Assessment & Plan  Is non compliant with CPAP at home, last use was about a year ago.  Is open to trailing CPAP while in [facility].    Plan:   CPAP QHS    Gastroesophageal reflux disease without esophagitis  Assessment & Plan  Home regimen omeprazole.    Plan:  Protonix as omeprazole is not in hospital formulary.             Disposition: Med Surg    ICU Core Measures     A: Assess, Prevent, and Manage Pain Has pain been assessed? Yes  Need for changes to pain regimen? No   B: Both SAT/SAT  N/A   C: Choice of Sedation RASS Goal: 0 Alert and Calm  Need for changes  to sedation or analgesia regimen? No   D: Delirium CAM-ICU: Negative   E: Early Mobility  Plan for early mobility? Yes   F: Family Engagement Plan for family engagement today? Yes       Review of Invasive Devices:      Prophylaxis:  VTE VTE covered by:  heparin (porcine), Subcutaneous, 5,000 Units at 05/01/24 0550       Stress Ulcer  covered byomeprazole (PriLOSEC) 40 MG capsule [[id number]] (Long-Term Med), pantoprazole (PROTONIX) EC tablet 20 mg [[id number]]         Significant 24hr Events     24hr events: No acute events overnight, patient was weaned off of the pressors yesterday evening.     Subjective  Mild headache, has mild dysphagia with tougher foods. Had a few bowel movements yesterday. Overall reports feeling well and ready to go home.    Review of Systems   Constitutional:  Negative for activity change, appetite change, chills and fever.   HENT:  Positive for trouble swallowing (with the tougher more solid foods.). Negative for sore throat.    Respiratory:  Negative for chest tightness.    Cardiovascular:  Negative for chest pain and leg swelling.   Gastrointestinal:  Negative for abdominal distention, abdominal pain, blood in stool, constipation and diarrhea.   Genitourinary:  Negative for dysuria.   Neurological:  Positive for headaches.      Objective                            Vitals I/O      Most Recent Min/Max in 24hrs   Temp 97.9 °F (36.6 °C) Temp  Min: 97 °F (36.1 °C)  Max: 98.8 °F (37.1 °C)   Pulse 90 Pulse  Min: 58  Max: 90   Resp (!) 30 Resp  Min: 10  Max: 32   /57 BP  Min: 96/49  Max: 159/57   O2 Sat 94 % SpO2  Min: 88 %  Max: 96 %      Intake/Output Summary (Last 24 hours) at 5/1/2024 0846  Last data filed at 5/1/2024 0400  Gross per 24 hour   Intake 680.45 ml   Output --   Net 680.45 ml       Diet Cardiovascular; Cardiac    Invasive Monitoring   Arterial Line  [place] /50  Arterial Line BP  Min: 102/40  Max: 126/50   MAP 80 mmHg  Arterial Line MAP (mmHg)  Min: 62 mmHg  Max: 260 mmHg            Physical Exam   Physical Exam  Vitals reviewed.   Eyes:      Extraocular Movements: Extraocular movements intact.      Conjunctiva/sclera: Conjunctivae normal.   Skin:     General: Skin is warm.      Comments: Right groin incision intact, no discharge noted, minimal bruising.   HENT:      Head: Normocephalic and atraumatic.      Mouth/Throat:      Mouth: Mucous membranes are moist.   Neck:      Comments: Left neck incision site with bruising around the incision, minimal swelling and mild tenderness.  Cardiovascular:      Rate and Rhythm: Normal rate and regular rhythm.   Abdominal: General: Bowel sounds are normal. There is no distension.      Palpations: Abdomen is soft.      Tenderness: There is no abdominal tenderness.   Constitutional:       General: She is not in acute distress.     Appearance: She is not ill-appearing.   Pulmonary:      Effort: Pulmonary effort is normal. No accessory muscle usage, respiratory distress or accessory muscle usage.      Breath sounds: Normal breath sounds. No stridor.   Neurological:      Mental Status: She is alert and oriented to person, place and time. She is CAM ICU negative.            Diagnostic Studies      EKG: No acute events on telemetry overnight  Imaging: No new imaging obtained overnight      Medications:  Scheduled PRN   acetaminophen, 975 mg, Q8H BONIFACIO  amiodarone, 100 mg, Daily  aspirin, 81 mg, Daily  clopidogrel, 75 mg, Daily  diltiazem, 120 mg, Daily  docusate sodium, 100 mg, Daily  fluticasone-vilanterol, 1 puff, Daily  furosemide, 40 mg, Daily  heparin (porcine), 5,000 Units, Q8H BONIFACIO  LORazepam, 1 mg, TID  methocarbamol, 500 mg, TID  metoprolol succinate, 100 mg, BID  pantoprazole, 20 mg, Daily Before Breakfast  polyethylene glycol, 17 g, Daily  senna, 1 tablet, Daily  spironolactone, 25 mg, BID      labetalol, 5 mg, Q15 Min PRN   And  hydrALAZINE, 15 mg, Q15 Min PRN   And  niCARdipine, 1-15 mg/hr, Continuous PRN  LORazepam, 2 mg, Q8H PRN  ondansetron,  4 mg, Q6H PRN  oxyCODONE, 5 mg, Q6H PRN  oxyCODONE, 2.5 mg, Q6H PRN  phenylephine,  mcg/min, Continuous PRN       Continuous    niCARdipine, 1-15 mg/hr  phenylephine,  mcg/min, Last Rate: Stopped (04/30/24 1700)         Labs:    CBC    Recent Labs     04/30/24 0429 05/01/24  0530   WBC 7.47 5.29   HGB 9.5* 9.3*   HCT 29.8* 29.2*    182     BMP    Recent Labs     04/30/24 0429 05/01/24  0530   SODIUM 138 140   K 3.6 4.3    105   CO2 28 31   AGAP 6 4   BUN 17 13   CREATININE 0.83 0.78   CALCIUM 8.4 8.6       Coags    Recent Labs     04/30/24  0429   INR 1.06   PTT 29        Additional Electrolytes  Recent Labs     04/29/24  1710 04/30/24 0429 05/01/24  0530   MG 1.7* 2.2 2.1   PHOS 4.1  --  3.3   CAIONIZED 1.08*  --   --           Blood Gas    No recent results  No recent results LFTs  Recent Labs     04/29/24  1710   ALT 13   AST 14   ALKPHOS 60   ALB 4.0   TBILI 0.37       Infectious  No recent results  Glucose  Recent Labs     04/29/24  1710 04/30/24 0429 05/01/24  0530   GLUC 168* 103 91               Eder Hairston MD

## 2024-05-01 NOTE — UTILIZATION REVIEW
Notification of Unplanned, Urgent, or   Emergency Inpatient Admission   AUTHORIZATION REQUEST   Admitting Facility [place] [address]  Tax ID: [id number]  NPI: [id number]  Place of Service: Acute [facility]  Admission Level of Care: Inpatient  Place of Service Code: 21     Attending Physician Information  Attending Name and NPI#: Roberto García  [[id number]  Phone: [phone]     Admission Information  Inpatient Admission Date/Time: 4/29/24 10:22 AM  Discharge Date/Time: No discharge date for patient encounter.  Admitting Diagnosis Code/Description:  Stenosis of left carotid artery [I65.22]     Utilization Review Contact  Gloria Sheldon Utilization   Phone: [phone]  Fax: [phone]  Email: [email or web address]  Contact for approvals/pending authorizations, clinical reviews, and discharge.     Physician Advisory Services Contact  Medical Necessity Denial & Qxlt-[id number]-Jmhz Discussion  Phone: [phone]  Fax: [phone]  Email: [email or web address]     DISCHARGE SUPPORT TEAM:  For Patients Discharge Needs & Updates  Phone: [phone] opt. 2 Fax: [phone]  Email: [email or web address]

## 2024-05-01 NOTE — TELEPHONE ENCOUNTER
[facility]ianna[facility] called, states patient was to see Dr. Ayoub on 04/29/2024, but admitted to the Hospital, Kianna is calling to schedule a hospital discharge appointment  for Patient # [id number] , request to schedule patient for a TCM. Contacted practice/clerical(vik) advised access to the practise schedule and to take note of Kianna's information and she would be contacted to schedule appointment. Confirmed Kianna with update, Kianna request for practice to contact Patient regarding the appointment along with transportation options. Please advise Patient at [phone], if any further questions.

## 2024-05-01 NOTE — ASSESSMENT & PLAN NOTE
63 yo female ex-smoker w/ a hx of PAF, HTN, familial hypercholesterolemia, aortic regurg, obesity, CAMILLE on CPAP, ventral hernia, GERD and IBS, presented to [facility] on 4/29/24 for a scheduled TCAR. Pt has a hx of an aborted L CEA in 1/2023 due to inability to attain adequate distal ICA exposure to allow procedure to be performed safely. Imaging showed persistent high-grade/near occlusive L ICA stenosis for which pt underwent a L TCAR on 4/29/24. Overnight, pt remained relatively hypotensive w/ SBP 80-90s for which a darron drip was started.    POD #1: Relative hypotension continued w/ SBP  requiring darron drip which was able to be weaned off by 17:00.     POD #2: Pt remains hemodynamically stable w/ Hgb 9.3 and stable VS. SBP remains 100-110 without any home antihypertensive medication. Pt is eating, swallowing, voiding and ambulating ambulating without difficulty. L neck incision well-approx, no swelling, erythema or drainage; surgical glue closure. R groin site soft w/ a small scattered ecchymosis, no swelling erythema or drainage, open to air. Mild L tongue deviation improving. Pt remains neurologically intact w/ no unilateral deficits. Pt denies any unilateral numbness, tingling or weakness and denies any complaints at this time.    Plan:  -Asymptomatic L GILSON  -S/P L TCAR on 4/29 (POD #2)  -L neck incision well-approx, no erythema or drainage, surgical glue closure  -R groin site soft, no swelling or drainage  -Continue frequent neurovascular checks  -Continue diet as tolerated  -OOB as tolerated  -BP remains soft without any antihypertensive medications  -Consult placed to cardiology for recommendations for reintroduction of home antihypertensive meds  -D/C ASA  -Continue plavix for management of GILSON  -Resume eliquis for PAF this evening  -Continue repatha for familial hypercholesterolemia  -Plan to transfer to med surg this afternoon w/ possible discharge home tomorrow  -Follow up w/ vascular surgery in the office;  appt scheduled for 5/13  -Will discuss w/ Dr. Julien

## 2024-05-01 NOTE — PLAN OF CARE
Problem: Prexisting or High Potential for Compromised Skin Integrity  Goal: Skin integrity is maintained or improved  Description: INTERVENTIONS:  - Identify patients at risk for skin breakdown  - Assess and monitor skin integrity  - Assess and monitor nutrition and hydration status  - Monitor labs   - Assess for incontinence   - Turn and reposition patient  - Assist with mobility/ambulation  - Relieve pressure over bony prominences  - Avoid friction and shearing  - Provide appropriate hygiene as needed including keeping skin clean and dry  - Evaluate need for skin moisturizer/barrier cream  - Collaborate with interdisciplinary team   - Patient/family teaching  - Consider wound care consult   Outcome: Progressing     Problem: PAIN - ADULT  Goal: Verbalizes/displays adequate comfort level or baseline comfort level  Description: Interventions:  - Encourage patient to monitor pain and request assistance  - Assess pain using appropriate pain scale  - Administer analgesics based on type and severity of pain and evaluate response  - Implement non-pharmacological measures as appropriate and evaluate response  - Consider cultural and social influences on pain and pain management  - Notify physician/advanced practitioner if interventions unsuccessful or patient reports new pain  Outcome: Progressing     Problem: INFECTION - ADULT  Goal: Absence or prevention of progression during hospitalization  Description: INTERVENTIONS:  - Assess and monitor for signs and symptoms of infection  - Monitor lab/diagnostic results  - Monitor all insertion sites, i.e. indwelling lines, tubes, and drains  - Monitor endotracheal if appropriate and nasal secretions for changes in amount and color  - [place] appropriate cooling/warming therapies per order  - Administer medications as ordered  - Instruct and encourage patient and family to use good hand hygiene technique  - Identify and instruct in appropriate isolation precautions for  identified infection/condition  Outcome: Progressing  Goal: Absence of fever/infection during neutropenic period  Description: INTERVENTIONS:  - Monitor WBC    Outcome: Progressing     Problem: SAFETY ADULT  Goal: Patient will remain free of falls  Description: INTERVENTIONS:  - Educate patient/family on patient safety including physical limitations  - Instruct patient to call for assistance with activity   - Consult OT/PT to assist with strengthening/mobility   - Keep Call bell within reach  - Keep bed low and locked with side rails adjusted as appropriate  - Keep care items and personal belongings within reach  - Initiate and maintain comfort rounds  - Make Fall Risk Sign visible to staff  - Apply yellow socks and bracelet for high fall risk patients  - Consider moving patient to room near nurses station  Outcome: Progressing     Problem: Knowledge Deficit  Goal: Patient/family/caregiver demonstrates understanding of disease process, treatment plan, medications, and discharge instructions  Description: Complete learning assessment and assess knowledge base.  Interventions:  - Provide teaching at level of understanding  - Provide teaching via preferred learning methods  Outcome: Progressing

## 2024-05-01 NOTE — TELEPHONE ENCOUNTER
I spoke to Sweetie today in the ICU at [facility].  Patient is still an inpt.  I told Sweetie I will wait for a discharge confirmation on the patient and then I will reach out to her to set up a TCM appt.

## 2024-05-01 NOTE — ASSESSMENT & PLAN NOTE
Home furosemide, spironolactone, diltiazem and metoprolol.  Patient's BP stable overnight, successfully weaned off of darron.      Continue home mediations with appropriate hold parameters.

## 2024-05-02 ENCOUNTER — TRANSITIONAL CARE MANAGEMENT (OUTPATIENT)
Dept: FAMILY MEDICINE CLINIC | Facility: CLINIC | Age: 64
End: 2024-05-02

## 2024-05-02 VITALS
WEIGHT: 204.59 LBS | TEMPERATURE: 97.9 F | RESPIRATION RATE: 18 BRPM | SYSTOLIC BLOOD PRESSURE: 110 MMHG | OXYGEN SATURATION: 94 % | BODY MASS INDEX: 34.93 KG/M2 | HEART RATE: 68 BPM | DIASTOLIC BLOOD PRESSURE: 51 MMHG | HEIGHT: 64 IN

## 2024-05-02 PROCEDURE — 99024 POSTOP FOLLOW-UP VISIT: CPT | Performed by: NURSE PRACTITIONER

## 2024-05-02 PROCEDURE — 99232 SBSQ HOSP IP/OBS MODERATE 35: CPT | Performed by: STUDENT IN AN ORGANIZED HEALTH CARE EDUCATION/TRAINING PROGRAM

## 2024-05-02 RX ORDER — SENNOSIDES 8.6 MG
8.6 TABLET ORAL DAILY PRN
COMMUNITY
Start: 2024-05-02

## 2024-05-02 RX ORDER — POLYETHYLENE GLYCOL 3350 17 G/17G
17 POWDER, FOR SOLUTION ORAL DAILY PRN
COMMUNITY
Start: 2024-05-02

## 2024-05-02 RX ADMIN — METHOCARBAMOL 500 MG: 500 TABLET ORAL at 08:28

## 2024-05-02 RX ADMIN — PANTOPRAZOLE SODIUM 20 MG: 20 TABLET, DELAYED RELEASE ORAL at 04:43

## 2024-05-02 RX ADMIN — FUROSEMIDE 40 MG: 40 TABLET ORAL at 08:28

## 2024-05-02 RX ADMIN — CLOPIDOGREL BISULFATE 75 MG: 75 TABLET ORAL at 08:28

## 2024-05-02 RX ADMIN — ACETAMINOPHEN 325MG 975 MG: 325 TABLET ORAL at 04:43

## 2024-05-02 RX ADMIN — FLUTICASONE FUROATE AND VILANTEROL TRIFENATATE 1 PUFF: 200; 25 POWDER RESPIRATORY (INHALATION) at 08:28

## 2024-05-02 RX ADMIN — METOPROLOL SUCCINATE 100 MG: 100 TABLET, EXTENDED RELEASE ORAL at 08:28

## 2024-05-02 RX ADMIN — LORAZEPAM 2 MG: 1 TABLET ORAL at 04:44

## 2024-05-02 RX ADMIN — SPIRONOLACTONE 25 MG: 25 TABLET ORAL at 08:28

## 2024-05-02 RX ADMIN — LORAZEPAM 1 MG: 1 TABLET ORAL at 08:28

## 2024-05-02 RX ADMIN — AMIODARONE HYDROCHLORIDE 100 MG: 100 TABLET ORAL at 08:28

## 2024-05-02 RX ADMIN — DILTIAZEM HYDROCHLORIDE 120 MG: 120 CAPSULE, COATED, EXTENDED RELEASE ORAL at 08:28

## 2024-05-02 RX ADMIN — APIXABAN 5 MG: 5 TABLET, FILM COATED ORAL at 08:28

## 2024-05-02 NOTE — PROGRESS NOTES
"Cardiology Progress Note - Nancy Jones 64 y.o. female MRN: [id number]    Unit/Bed#: E4 -01 Encounter: [id number]      Assessment & Plan:    Stenosis of left carotid artery  -Carotid ultrasound 9/12/2023 showed less than 50% right ICA stenosis, 79 9% left ICA stenosis  - Underwent left TCAR on 4/29/2024    Essential hypertension  -Continue with Toprol- mg twice daily, spironolactone 25 mg daily, and Cardizem 120 mg daily  - Blood pressure well-controlled today    Paroxysmal atrial fibrillation  - Notes in March 2020  - Rate/rhythm control with Toprol- mg twice daily, diltiazem 120 mg daily and amiodarone 100 mg daily - anticoagulated on Eliquis 5 mg twice daily    Gastroesophageal reflux disease without esophagitis    CAMILLE (obstructive sleep apnea)    COPD (chronic obstructive pulmonary disease) ([facility])    Summary:  - Blood pressure remains well-controlled today  - Continue with current antihypertensive Rx  - Stable from cardiac standpoint for discharge home today  - She has a follow-up appointment in our office on 7/9    Subjective:   No significant events overnight.  She reports feeling well this morning.  She has noticed some trace swelling in her lower extremities which she attributes to the Plavix.  Denies chest pain, orthopnea, abdominal pain, nausea, vomiting, fever, chills, headache, dizziness or palpitations.    Objective:     Vitals: Blood pressure 110/51, pulse 68, temperature 97.9 °F (36.6 °C), temperature source Temporal, resp. rate 18, height 5' 4\" (1.626 m), weight 92.8 kg (204 lb 9.4 oz), SpO2 94%, not currently breastfeeding., Body mass index is 35.12 kg/m².,   Orthostatic Blood Pressures      Flowsheet Row Most Recent Value   Blood Pressure 110/51 filed at 05/02/2024 0731   Patient Position - Orthostatic VS Lying filed at 05/02/2024 0731              Intake/Output Summary (Last 24 hours) at 5/2/2024 1147  Last data filed at 5/2/2024 0852  Gross per 24 hour   Intake 700 ml   Output " --   Net 700 ml           Physical Exam:    GEN: Nancy Jones appears well, alert and oriented x 3, pleasant and cooperative   HEENT: Mucous membranes moist, no scleral icterus, no conjunctival pallor  NECK: No elevated JVD, incision on left lower back, appears clean, dry and intact  HEART: Regular rate and rhythm, normal S1 and S2, no significant audible murmurs  LUNGS: clear to auscultation bilaterally; no wheezes, rales, or rhonchi   ABDOMEN: normal bowel sounds, soft, no tenderness, no distention  EXTREMITIES: peripheral pulses normal; no significant lower extremity edema  NEURO: no focal findings   SKIN: No lesions or rashes on exposed skin        Current Facility-Administered Medications:     acetaminophen (TYLENOL) tablet 975 mg, 975 mg, Oral, Q8H BONIFACIO, JUD Helton, 975 mg at 05/02/24 0443    amiodarone tablet 100 mg, 100 mg, Oral, Daily, JUD Helton, 100 mg at 05/02/24 0828    apixaban (ELIQUIS) tablet 5 mg, 5 mg, Oral, BID, JUD Helton, 5 mg at 05/02/24 0828    clopidogrel (PLAVIX) tablet 75 mg, 75 mg, Oral, Daily, JUD Helton, 75 mg at 05/02/24 0828    diltiazem (CARDIZEM CD) 24 hr capsule 120 mg, 120 mg, Oral, Daily, JUD Helton, 120 mg at 05/02/24 0828    docusate sodium (COLACE) capsule 100 mg, 100 mg, Oral, Daily, JUD Helton, 100 mg at 04/30/24 0849    fluticasone-vilanterol 200-25 mcg/actuation 1 puff, 1 puff, Inhalation, Daily, JUD Helton, 1 puff at 05/02/24 0828    furosemide (LASIX) tablet 40 mg, 40 mg, Oral, Daily, JUD Helton, 40 mg at 05/02/24 0828    labetalol (NORMODYNE) injection 5 mg, 5 mg, Intravenous, Q15 Min PRN **AND** hydrALAZINE (APRESOLINE) injection 15 mg, 15 mg, Intravenous, Q15 Min PRN **AND** [DISCONTINUED] niCARdipine (CARDENE) 25 mg (STANDARD CONCENTRATION) in sodium chloride 0.9% 250 mL, 1-15 mg/hr, Intravenous, Continuous PRN, Idris Peterson DO    LORazepam (ATIVAN) tablet 1 mg, 1 mg, Oral, TID, JUD Helton, 1 mg  at 05/02/24 0828    LORazepam (ATIVAN) tablet 2 mg, 2 mg, Oral, Q8H PRN, JUD Helton, 2 mg at 05/02/24 0444    methocarbamol (ROBAXIN) tablet 500 mg, 500 mg, Oral, TID, LIBERTY HeltonNP, 500 mg at 05/02/24 0828    metoprolol succinate (TOPROL-XL) 24 hr tablet 100 mg, 100 mg, Oral, BID, JUD Helton, 100 mg at 05/02/24 0828    ondansetron (ZOFRAN-ODT) dispersible tablet 4 mg, 4 mg, Oral, Q6H PRN, JUD Helton, 4 mg at 05/01/24 0550    oxyCODONE (ROXICODONE) IR tablet 5 mg, 5 mg, Oral, Q6H PRN, JUD Helton, 5 mg at 05/01/24 1015    oxyCODONE (ROXICODONE) split tablet 2.5 mg, 2.5 mg, Oral, Q6H PRN, JUD Helton, 2.5 mg at 05/01/24 2007    pantoprazole (PROTONIX) EC tablet 20 mg, 20 mg, Oral, Daily Before Breakfast, JUD Helton, 20 mg at 05/02/24 0443    polyethylene glycol (MIRALAX) packet 17 g, 17 g, Oral, Daily, JUD Helton, 17 g at 04/30/24 0847    senna (SENOKOT) tablet 8.6 mg, 1 tablet, Oral, Daily, JUD Helton, 8.6 mg at 04/30/24 0849    spironolactone (ALDACTONE) tablet 25 mg, 25 mg, Oral, BID, JUD Helton, 25 mg at 05/02/24 0828    Labs & Results:    Lab Results   Component Value Date    TROPONINI <0.02 09/08/2021    TROPONINI 0.04 08/24/2021    TROPONINI <0.02 05/08/2021       Lab Results   Component Value Date    CALCIUM 8.6 05/01/2024     11/13/2017    K 4.3 05/01/2024    CO2 31 05/01/2024     05/01/2024    BUN 13 05/01/2024    CREATININE 0.78 05/01/2024       Lab Results   Component Value Date    WBC 5.29 05/01/2024    HGB 9.3 (L) 05/01/2024    HCT 29.2 (L) 05/01/2024    MCV 96 05/01/2024     05/01/2024     Results from last 7 days   Lab Units 04/30/24  0429   INR  1.06       Lab Results   Component Value Date    CHOL 413 (H) 11/13/2017    CHOL 330 (H) 11/05/2016     Lab Results   Component Value Date    HDL 58 05/23/2023    HDL 46 (L) 02/21/2023     Lab Results   Component Value Date    LDLCALC 111 (H) 05/23/2023    LDLCALC  139 (H) 02/21/2023     Lab Results   Component Value Date    TRIG 257 (H) 05/23/2023    TRIG 322 (H) 02/21/2023       Lab Results   Component Value Date    ALT 13 04/29/2024    AST 14 04/29/2024    ALKPHOS 60 04/29/2024         EKG personally reviewed by )Vin Pantoja MD. No acute changes

## 2024-05-02 NOTE — CASE MANAGEMENT
Case Management Discharge Planning Note    Patient name Nancy Jones  Location [facility] 4 /E4 -* MRN [id number]  : 1960 Date 2024       Current Admission Date: 2024  Current Admission Diagnosis:Stenosis of left carotid artery   Patient Active Problem List    Diagnosis Date Noted    Acute lateral meniscus tear of left knee 2024    Acute pain of left knee 2024    Left facial numbness 2024    Other chest pain 2024    Ventral hernia without obstruction or gangrene 2024    Atherosclerosis of native arteries of extremities with rest pain, bilateral legs (HCC) 2024    Acute on chronic diastolic (congestive) heart failure ([facility]) 2024    Incisional hernia, without obstruction or gangrene 2023    Stage 3 chronic kidney disease, unspecified whether stage 3a or 3b CKD ([facility]) 2023    Dysuria 2023    Omental infarction ([facility]) 2023    Hypokalemia 2023    Night sweats 2023    S/P small bowel resection 2023    Anxiety 2023    Acute postoperative pain 2023    Colitis 2023    Chronic migraine w/o aura w/o status migrainosus, not intractable 2023    IIH (idiopathic intracranial hypertension) 2023    Hematochezia 2023    Left carotid stenosis 2023    Colonic ischemia ([facility]) 2023    Paresthesia 2023    Injury of left facial nerve 2023    Partial facial nerve palsy 2023    Hepatic steatosis 2022    Asymptomatic stenosis of left carotid artery 10/05/2022    Stenosis of left carotid artery 2022    Carotid artery stenosis 2022    Appendix disease 2022    Urinary retention 2022    Peripheral vascular disease (HCC) 2022    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HCC) 2021    COPD (chronic obstructive pulmonary disease) (HCC) 2021    Unspecified diastolic (congestive) heart failure (HCC) 2021    Hypertensive heart disease with  congestive heart failure (HCC) 12/21/2020    CAMILLE (obstructive sleep apnea)     Lumbar radiculopathy 10/01/2020    A-fib (HCC) 03/19/2020    Angio-edema 01/22/2020    Gastroesophageal reflux disease without esophagitis 08/12/2019    Allergic reaction 12/03/2018    AMD (age-related macular degeneration), bilateral 11/16/2018    Angina pectoris (HCC) 11/15/2017    Migraines 10/14/2016    Essential hypertension 08/13/2015      LOS (days): 3  Geometric Mean LOS (GMLOS) (days):   Days to GMLOS:     OBJECTIVE:  Risk of Unplanned Readmission Score: 20.02         Current admission status: Inpatient   Preferred Pharmacy:   [id number] - [place] - [address]  [address]  [place] [address]  Phone: [phone] Fax: [phone]    Primary Care Provider: Barber Ayoub DO    Primary Insurance: [facility] REP  Secondary Insurance:     DISCHARGE DETAILS:    Discharge planning discussed with:: Pt  Freedom of Choice: Yes     CM contacted family/caregiver?: No- see comments (pt able to make her own decisions)  Were Treatment Team discharge recommendations reviewed with patient/caregiver?: Yes  Did patient/caregiver verbalize understanding of patient care needs?: Yes  Were patient/caregiver advised of the risks associated with not following Treatment Team discharge recommendations?: Yes    Contacts  Patient Contacts: Patient  Contact Method: In Person  Reason/Outcome: Continuity of Care, Discharge Planning    Requested Home Health Care         Is the patient interested in HHC at discharge?: No    DME Referral Provided  Referral made for DME?: No         Would you like to participate in our [facility] service program?  : Yes    Treatment Team Recommendation: Home  Discharge Destination Plan:: Home  Transport at Discharge : Other (Comment) ([facility])  Dispatcher Contacted: Yes  Number/Name of Dispatcher: Roundtrip  Transported by (Company and Unit #): [facility]  ETA of Transport (Date): 05/02/24  ETA of  Transport (Time): 1200     Transfer Mode: Ambulate  Accompanied by: Alone     IMM Given (Date):: 05/02/24 (pt informed of her rights to appeal, verblaized understanding, signed form and copy was yecenia.)  IMM Given to:: Patient     Additional Comments: Vascular NP had met with the pt and pt is discharged.  No post acute care needs noted.  Pt is aware to complete follow up appts.  Will need a ride.  Lyft transport arranged via round trip.  Pt had requested her discharge meds go to [facility].  Discharge meds were Miralax and Senna.  She stated she had the Miralax at home and would not take the Senna.  she was concerned about meds for nausea, but stated she has phenergan that she can take at home if needed.  Ready for discharge.

## 2024-05-02 NOTE — DISCHARGE SUMMARY
Formerly [facility]  Discharge- Nancy Jones 1960, 64 y.o. female MRN: [id number]  Unit/Bed#: E4 -01 Encounter: [id number]  Primary Care Provider: Barber Ayoub DO   Date and time admitted to hospital: 4/29/2024  5:38 AM    * Stenosis of left carotid artery  Assessment & Plan  63 yo female ex-smoker w/ a hx of PAF, HTN, familial hypercholesterolemia, aortic regurg, obesity, CAMILLE on CPAP, ventral hernia, GERD and IBS, presented to [facility] on 4/29/24 for a scheduled TCAR. Pt has a hx of an aborted L CEA in 1/2023 due to inability to attain adequate distal ICA exposure to allow procedure to be performed safely. Imaging showed persistent high-grade/near occlusive L ICA stenosis for which pt underwent a L TCAR on 4/29/24.     Plan:  -Asymptomatic L GILSON  -S/P L TCAR on 4/29 (POD #3)  -L neck incision well-approx, no erythema or drainage, surgical glue closure  -R groin site soft, no swelling or drainage  -Continue diet as tolerated  -OOB as tolerated  -BP remains stable on home medications w/ no hypotension  -Seen by cardiology for antihypertension medication management; appreciate recommendations  -Continue plavix for management of GILSON  -Continue eliquis for PAF  -Continue repatha for familial hypercholesterolemia  -Plan for discharge home today  -Case management following; pt will need a Lyft ride home  -Follow up w/ vascular surgery in the office; appt scheduled for 5/13  -Will discuss w/ Dr. García        Admission Date: 4/29/2024     Discharge Date:05/02/24    Attending:Roberto García DO     Consultants: Cardiology, Critical Care    Admitting Diagnosis: Stenosis of left carotid artery [I65.22]    Principle/ Secondary Diagnosis:  Past Medical History:   Diagnosis Date    A-fib ([facility]) 03/2020    Allergic     Anxiety     Arthritis     Asthma     Back pain     and muscle pain    Bruises easily     Chronic pain disorder     Interstitial pain    Colon polyp     Dental bridge present      "Depression     Eczema     GERD (gastroesophageal reflux disease)     Heart murmur     History of blood clots     per pt \"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\"    History of pneumonia     Hives     Hyperlipidemia     Hypertension     Infertility, female     Interstitial cystitis     Migraine     sees neurologist    Motion sickness     Obesity     Palpitations     PONV (postoperative nausea and vomiting)     Premature atrial contractions 11/09/2022    Psychiatric disorder     TIA (transient ischemic attack) 09/2022    per pt --\"had MRI and 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\"    Urinary incontinence     wears Pads    Venous insufficiency 08/01/2017    Wears glasses      Past Surgical History:   Procedure Laterality Date    COLONOSCOPY      DILATION AND CURETTAGE OF UTERUS      EGD      EXPLORATORY LAPAROTOMY      for infertility    EXPLORATORY LAPAROTOMY W/ BOWEL RESECTION N/A 7/19/2023    Procedure: LAPAROTOMY EXPLORATORY W/ BOWEL RESECTION;  Surgeon: Crista Recinos MD;  [facility]: [facility];  Service: General    HAND SURGERY      Hand excision of tendon cyst    CA LAPAROSCOPIC APPENDECTOMY N/A 05/03/2022    Procedure: APPENDECTOMY LAPAROSCOPIC;  Surgeon: Vin Fields MD;  Location: [facility];  Service: General    CA LAPS ABD PRTM&OMENTUM DX W/WO SPEC BR/WA SPX N/A 7/19/2023    Procedure: LAPAROSCOPY DIAGNOSTIC, LYSIS OF ADHESIONS, LAPAROSCOPY TAKE TAKEDOWN OF LEFT COLON, EXPLORATORY LAPAROSCOPY, LYSIS OF ADHESIONS, RESECTION OF TRANSVERSE COLON SPLENIC FLEXURE AND DESCENDING COLON , TAKE DOWN OF SPLENIC FLEXURE, SIGMOIDOSCOPY, MOBILIZATION OF RIGHT COLON, PRIMARY ANASTOMOSIS EEA 29, TAP BLOCK;  Surgeon: Crista Recinos MD;  [facility];  Service: General    CA TCAT IV STENT CRV CRTD ART EMBOLIC PROTECJ Left 4/29/2024    Procedure: ANGIOPLASTY ARTERY CAROTID W/ STENT;  Surgeon: Roberto García DO;  [facility];  Service: Vascular    CA TEAEC W/PATCH GRF CAROTID VERTB " SUBCLAV NECK INC Left 1/31/2023    Procedure: EXPLORATION OF LEFT CAROTID ARTERY; ABORTED ENDARTERECTOMY ARTERY CAROTID;  Surgeon: Roberto García DO;  [facility];  Service: Vascular    SUPERIOR MESTENTERIC ARTERY STENT      WISDOM TOOTH EXTRACTION         Procedures Performed:     OR TCAT IV STENT CRV CRTD ART EMBOLIC PROTECJ [[id number]] (ANGIOPLASTY ARTERY CAROTID W/ STENT)  ANGIOPLASTY ARTERY CAROTID W/ STENT (Left: Neck)  Procedure(s):  ANGIOPLASTY ARTERY CAROTID W/ STENT    Laboratory data at discharge:   Results from last 7 days   Lab Units 05/01/24  0530 04/30/24  0429 04/29/24  1710   WBC Thousand/uL 5.29 7.47 7.72   HEMOGLOBIN g/dL 9.3* 9.5* 10.3*   HEMATOCRIT % 29.2* 29.8* 30.9*   PLATELETS Thousands/uL 182 198 194     Results from last 7 days   Lab Units 05/01/24  0530 04/30/24  0429 04/29/24  1710   POTASSIUM mmol/L 4.3 3.6 4.0   CHLORIDE mmol/L 105 104 103   CO2 mmol/L 31 28 26   BUN mg/dL 13 17 20   CREATININE mg/dL 0.78 0.83 1.09   CALCIUM mg/dL 8.6 8.4 8.5     Results from last 7 days   Lab Units 04/30/24  0429   INR  1.06   PTT seconds 29           Discharge instructions/Information to patient and family:   See after visit summary for information provided to patient and family.     Discharge Medications:  See after visit summary for reconciled discharge medications provided to patient and family.      Hospital Course:   63 yo female ex-smoker w/ a hx of PAF, HTN, familial hypercholesterolemia, aortic regurg, obesity, CAMILLE on CPAP, ventral hernia, GERD and IBS, presented to [facility] on 4/29/24 for a scheduled TCAR. Pt has a hx of an aborted L CEA in 1/2023 due to inability to attain adequate distal ICA exposure to allow procedure to be performed safely. Imaging showed persistent high-grade/near occlusive L ICA stenosis for which pt underwent a L TCAR on 4/29/24. Overnight, pt remained relatively hypotensive w/ SBP 80-90s for which a darron drip was started.     Relative hypotension continued w/ SBP   requiring darron drip which was able to be weaned off by 17:00 on POD #1. Pt seen in consult by cardiology on 5/1 for management of antihypertensive medications secondary to persistent soft BP. However, pt's BP increased without any intervention and was able to tolerate home medications without any further hypotension. Per cardiology, continue home medications as ordered and if BP remains consistently low, consider decreasing aldactone in the future.      Today, pt is eating, swallowing, voiding and ambulating ambulating at baseline. She remains hemodynamically stable w/ stable VS. L neck incision well-approx, no swelling, erythema or drainage; surgical glue closure. R groin site soft w/ a small area of scattered ecchymosis on upper thigh, no swelling erythema or drainage; site open to air. Slight L tongue deviation almost resolved. Pt remains neurologically intact w/ no unilateral deficits. Pt reports mild R-sided frontal headache which she believes is secondary to seasonal allergies as she has not taken her allegra since being admitted to the hospital. Otherwise, pt denies any unilateral numbness, tingling or weakness and denies any further complaints at this time. Continue routine robaxin, tylenol prn, and home prn tramadol for pain control. Continue plavix for medical management of GILSON w/ stent. ASA D/C. Continue eliquis for PAF and repatha for hx of familial hypercholesterolemia. Pt to follow up w/ vascular surgery in the office; appt scheduled for 5/13/24.     Hospital course was complicated by the following: hypotension requiring pressor support. See above for details.      Prior to discharge, the patient was given instructions for outpatient care and follow-up.  The patient has been instructed to call w/ any questions, changes, or concerns for the medical condition.    For further details of the hospitalization, please refer to the medical record.    Condition at Discharge: good     Provisions for  Follow-Up Care:  See after visit summary for information related to follow-up care and any pertinent home health orders.      Disposition: Home    Planned Readmission: No    Discharge Statement   I spent 30 minutes discharging the patient. This time was spent on the day of discharge. I had direct contact with the patient on the day of discharge. Additional documentation is required if more than 30 minutes were spent on discharge.     JUD Luna  5/2/2024

## 2024-05-02 NOTE — NURSING NOTE
AVS printed and reviewed W/ Pt   IV was removed/ Tele Removed   Pt was brought down via wheelchair by PCA

## 2024-05-02 NOTE — PLAN OF CARE
Problem: Prexisting or High Potential for Compromised Skin Integrity  Goal: Skin integrity is maintained or improved  Description: INTERVENTIONS:  - Identify patients at risk for skin breakdown  - Assess and monitor skin integrity  - Assess and monitor nutrition and hydration status  - Monitor labs   - Assess for incontinence   - Turn and reposition patient  - Assist with mobility/ambulation  - Relieve pressure over bony prominences  - Avoid friction and shearing  - Provide appropriate hygiene as needed including keeping skin clean and dry  - Evaluate need for skin moisturizer/barrier cream  - Collaborate with interdisciplinary team   - Patient/family teaching  - Consider wound care consult   Outcome: Progressing     Problem: PAIN - ADULT  Goal: Verbalizes/displays adequate comfort level or baseline comfort level  Description: Interventions:  - Encourage patient to monitor pain and request assistance  - Assess pain using appropriate pain scale  - Administer analgesics based on type and severity of pain and evaluate response  - Implement non-pharmacological measures as appropriate and evaluate response  - Consider cultural and social influences on pain and pain management  - Notify physician/advanced practitioner if interventions unsuccessful or patient reports new pain  Outcome: Progressing     Problem: SAFETY ADULT  Goal: Patient will remain free of falls  Description: INTERVENTIONS:  - Educate patient/family on patient safety including physical limitations  - Instruct patient to call for assistance with activity   - Consult OT/PT to assist with strengthening/mobility   - Keep Call bell within reach  - Keep bed low and locked with side rails adjusted as appropriate  - Keep care items and personal belongings within reach  - Initiate and maintain comfort rounds  - Make Fall Risk Sign visible to staff  - Offer Toileting every 4 Hours, in advance of need  - Apply yellow socks and bracelet for high fall risk  patients  - Consider moving patient to room near nurses station  Outcome: Progressing

## 2024-05-02 NOTE — UTILIZATION REVIEW
NOTIFICATION OF ADMISSION DISCHARGE   This is a Notification of Discharge from [facility]. Please be advised that this patient has been discharge from our facility. Below you will find the admission and discharge date and time including the patient’s disposition.   UTILIZATION REVIEW CONTACT:  Gloria Sheldon  Utilization   Network Utilization Review Department  Phone: [phone] x carefully listen to the prompts. All voicemails are confidential.  Email: [email or web address]     ADMISSION INFORMATION  PRESENTATION DATE: 4/29/2024  5:38 AM  OBERVATION ADMISSION DATE:   INPATIENT ADMISSION DATE: 4/29/24 10:22 AM   DISCHARGE DATE: 5/2/2024 12:16 PM   DISPOSITION:Home/Self Care    Network Utilization Review Department  ATTENTION: Please call with any questions or concerns to [phone] and carefully listen to the prompts so that you are directed to the right person. All voicemails are confidential.   For Discharge needs, contact Care Management DC Support Team at [phone] opt. 2  Send all requests for admission clinical reviews, approved or denied determinations and any other requests to dedicated fax number below belonging to the campus where the patient is receiving treatment. List of dedicated fax numbers for the Facilities:  FACILITY NAME UR FAX NUMBER   ADMISSION DENIALS (Administrative/Medical Necessity) [phone]   DISCHARGE SUPPORT TEAM ([facility]) [phone]   PARENT CHILD HEALTH (Maternity/NICU/Pediatrics) [phone]   [facility] [phone]   [facility] [phone]   [facility] [phone]   [facility] [phone]   [facility] [phone]   [facility] [phone]   [facility] [phone]   [facility] [phone]   [facility] [[email or web address]]  [facility] [phone]   [facility] [phone]   [facility] [phone]   [facility] [phone]

## 2024-05-02 NOTE — ASSESSMENT & PLAN NOTE
65 yo female ex-smoker w/ a hx of PAF, HTN, familial hypercholesterolemia, aortic regurg, obesity, CAMILLE on CPAP, ventral hernia, GERD and IBS, presented to [facility] on 4/29/24 for a scheduled TCAR. Pt has a hx of an aborted L CEA in 1/2023 due to inability to attain adequate distal ICA exposure to allow procedure to be performed safely. Imaging showed persistent high-grade/near occlusive L ICA stenosis for which pt underwent a L TCAR on 4/29/24.     Plan:  -Asymptomatic L GILSON  -S/P L TCAR on 4/29 (POD #3)  -L neck incision well-approx, no erythema or drainage, surgical glue closure  -R groin site soft, no swelling or drainage  -Continue diet as tolerated  -OOB as tolerated  -BP remains stable on home medications w/ no hypotension  -Seen by cardiology for antihypertension medication management; appreciate recommendations  -Continue plavix for management of GILSON  -Continue eliquis for PAF  -Continue repatha for familial hypercholesterolemia  -Plan for discharge home today  -Case management following; pt will need a Lyft ride home  -Follow up w/ vascular surgery in the office; appt scheduled for 5/13  -Will discuss w/ Dr. Gillette

## 2024-05-03 ENCOUNTER — PATIENT OUTREACH (OUTPATIENT)
Dept: FAMILY MEDICINE CLINIC | Facility: CLINIC | Age: 64
End: 2024-05-03

## 2024-05-03 NOTE — PROGRESS NOTES
Received ADT alert patient was discharged home following hospitalization s/p L TCAR.  Left vm message with contact information.

## 2024-05-06 ENCOUNTER — TELEPHONE (OUTPATIENT)
Dept: VASCULAR SURGERY | Facility: CLINIC | Age: 64
End: 2024-05-06

## 2024-05-06 ENCOUNTER — TELEPHONE (OUTPATIENT)
Dept: FAMILY MEDICINE CLINIC | Facility: CLINIC | Age: 64
End: 2024-05-06

## 2024-05-06 ENCOUNTER — OFFICE VISIT (OUTPATIENT)
Dept: FAMILY MEDICINE CLINIC | Facility: CLINIC | Age: 64
End: 2024-05-06
Payer: COMMERCIAL

## 2024-05-06 ENCOUNTER — PATIENT OUTREACH (OUTPATIENT)
Dept: FAMILY MEDICINE CLINIC | Facility: CLINIC | Age: 64
End: 2024-05-06

## 2024-05-06 VITALS
TEMPERATURE: 98.7 F | OXYGEN SATURATION: 93 % | HEART RATE: 60 BPM | SYSTOLIC BLOOD PRESSURE: 130 MMHG | HEIGHT: 64 IN | WEIGHT: 190.2 LBS | DIASTOLIC BLOOD PRESSURE: 76 MMHG | BODY MASS INDEX: 32.47 KG/M2

## 2024-05-06 DIAGNOSIS — I48.91 ATRIAL FIBRILLATION, UNSPECIFIED TYPE (HCC): ICD-10-CM

## 2024-05-06 DIAGNOSIS — S83.282A ACUTE LATERAL MENISCUS TEAR OF LEFT KNEE, INITIAL ENCOUNTER: Primary | ICD-10-CM

## 2024-05-06 DIAGNOSIS — E66.01 OBESITY, MORBID (HCC): ICD-10-CM

## 2024-05-06 DIAGNOSIS — I65.22 LEFT CAROTID ARTERY STENOSIS: ICD-10-CM

## 2024-05-06 DIAGNOSIS — N18.30 STAGE 3 CHRONIC KIDNEY DISEASE, UNSPECIFIED WHETHER STAGE 3A OR 3B CKD (HCC): ICD-10-CM

## 2024-05-06 DIAGNOSIS — R11.0 NAUSEA: ICD-10-CM

## 2024-05-06 DIAGNOSIS — K55.069 MESENTERIC ARTERY THROMBOSIS (HCC): ICD-10-CM

## 2024-05-06 DIAGNOSIS — M54.16 LUMBAR RADICULOPATHY: ICD-10-CM

## 2024-05-06 DIAGNOSIS — F11.20 CONTINUOUS OPIOID DEPENDENCE (HCC): ICD-10-CM

## 2024-05-06 DIAGNOSIS — I10 ESSENTIAL HYPERTENSION: ICD-10-CM

## 2024-05-06 DIAGNOSIS — I65.22 LEFT CAROTID STENOSIS: Primary | ICD-10-CM

## 2024-05-06 PROCEDURE — 99495 TRANSJ CARE MGMT MOD F2F 14D: CPT | Performed by: FAMILY MEDICINE

## 2024-05-06 RX ORDER — METHOCARBAMOL 500 MG/1
500 TABLET, FILM COATED ORAL 3 TIMES DAILY
Qty: 90 TABLET | Refills: 5 | Status: SHIPPED | OUTPATIENT
Start: 2024-05-06

## 2024-05-06 RX ORDER — ONDANSETRON 8 MG/1
8 TABLET, ORALLY DISINTEGRATING ORAL EVERY 12 HOURS PRN
Qty: 30 TABLET | Refills: 2 | Status: SHIPPED | OUTPATIENT
Start: 2024-05-06

## 2024-05-06 NOTE — PROGRESS NOTES
Assessment/Plan: Patient doing well status post surgery left carotid artery stenosis with angioplasty and stent placement.  Patient will follow with vascular specialist on May 13.  Blood pressure stabilized at this point.  Patient use tramadol as needed for pain.  Patient will use methocarbamol as directed.  Phenergan or Zofran as needed for nausea.  Other chronic conditions relatively stable.       Diagnoses and all orders for this visit:    Left carotid stenosis    Left carotid artery stenosis  -     [facility]ulatory Referral to [facility]    Lumbar radiculopathy  -     methocarbamol (ROBAXIN) 500 mg tablet; Take 1 tablet (500 mg total) by mouth 3 (three) times a day    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HCC)    Atrial fibrillation, unspecified type (HCC)    Essential hypertension    Stage 3 chronic kidney disease, unspecified whether stage 3a or 3b CKD (HCC)    Continuous opioid dependence (HCC)    Obesity, morbid (HCC)            Subjective:        Patient ID: Nancy Jones is a 64 y.o. female.      Patient is here status post hospitalization from 29 April through May 2 for left carotid artery stenosis status post left TCAR.  Patient did have angioplasty of the carotid artery with stent placement.  Patient did have bout of hypotension and cardiology was consulted also.  Patient was found to be slightly anemic with hemoglobin of 9.3.  Patient does have an appointment with vascular specialist on 13 May.  Other diagnoses include A-fib hypertension which were stable.  Hyperlipidemia stable.  Patient's pain is 6 out of 10.  No calf tenderness or swelling lower extremities.  Patient is ambulating.  Patient with some difficulty swallowing.          The following portions of the patient's history were reviewed and updated as appropriate: allergies, current medications, past family history, past medical history, past social history, past surgical history and problem list.      Review of Systems   Constitutional: Negative.   "  HENT:  Positive for trouble swallowing.         Left neck pain at incision site.   Eyes: Negative.    Respiratory: Negative.     Cardiovascular: Negative.    Gastrointestinal: Negative.    Endocrine: Negative.    Genitourinary: Negative.    Musculoskeletal: Negative.    Skin: Negative.    Allergic/Immunologic: Negative.    Neurological: Negative.    Hematological: Negative.    Psychiatric/Behavioral: Negative.             Objective:        Depression Screening and Follow-up Plan: Clincally patient does not have depression. No treatment is required.             /76 (BP Location: Right arm, Patient Position: Sitting, Cuff Size: Standard)   Pulse 60   Temp 98.7 °F (37.1 °C) (Temporal)   Ht 5' 4\" (1.626 m)   Wt 86.3 kg (190 lb 3.2 oz)   LMP  (LMP Unknown)   SpO2 93%   BMI 32.65 kg/m²          Physical Exam  Vitals and nursing note reviewed.   Constitutional:       General: She is not in acute distress.     Appearance: Normal appearance. She is not ill-appearing, toxic-appearing or diaphoretic.   HENT:      Head: Normocephalic and atraumatic.      Right Ear: Tympanic membrane, ear canal and external ear normal. There is no impacted cerumen.      Left Ear: Tympanic membrane, ear canal and external ear normal. There is no impacted cerumen.      Nose: Nose normal. No congestion or rhinorrhea.      Mouth/Throat:      Mouth: Mucous membranes are moist.      Pharynx: No oropharyngeal exudate or posterior oropharyngeal erythema.   Eyes:      General: No scleral icterus.        Right eye: No discharge.         Left eye: No discharge.   Neck:      Vascular: No carotid bruit.   Cardiovascular:      Rate and Rhythm: Normal rate and regular rhythm.      Pulses: Normal pulses.      Heart sounds: Normal heart sounds. No murmur heard.     No friction rub. No gallop.   Pulmonary:      Effort: Pulmonary effort is normal. No respiratory distress.      Breath sounds: Normal breath sounds. No stridor. No wheezing, rhonchi or " rales.   Chest:      Chest wall: No tenderness.   Musculoskeletal:         General: No swelling, tenderness, deformity or signs of injury. Normal range of motion.      Cervical back: Normal range of motion and neck supple. No rigidity. No muscular tenderness.      Right lower leg: No edema.      Left lower leg: No edema.   Lymphadenopathy:      Cervical: No cervical adenopathy.   Skin:     General: Skin is warm and dry.      Capillary Refill: Capillary refill takes less than 2 seconds.      Coloration: Skin is not jaundiced.      Findings: No bruising, erythema, lesion or rash.      Comments: Incision intact left neck.   Neurological:      Mental Status: She is alert and oriented to person, place, and time. Mental status is at baseline.      Cranial Nerves: No cranial nerve deficit.      Sensory: No sensory deficit.      Motor: No weakness.      Coordination: Coordination normal.      Gait: Gait normal.   Psychiatric:         Mood and Affect: Mood normal.         Behavior: Behavior normal.         Thought Content: Thought content normal.         Judgment: Judgment normal.

## 2024-05-06 NOTE — TELEPHONE ENCOUNTER
Patient is asking if you have any recommendations for a good dentist.     Results from her MRI done 4/24/24 have been given to her now as well, referral entered for ortho.

## 2024-05-06 NOTE — TELEPHONE ENCOUNTER
Message sent to patient with dentist information via [email or web address], and it looks as if the dentist has left a message for her to call back to schedule as well.

## 2024-05-06 NOTE — TELEPHONE ENCOUNTER
I left a message asking patient to call back to give her the information for the dental referral.     Dr Lester Coto  [address] [facility]  [phone]

## 2024-05-06 NOTE — PROGRESS NOTES
TCM Call       Date and time call was made  5/2/2024  1:01 PM    Hospital care reviewed  Records reviewed    Patient was hospitialized at  [facility]    Date of Admission  04/29/24    Date of discharge  05/02/24    Diagnosis  STENOSIS LEFT CAROTID ARTERY    Disposition  Home    Were the patients medications reviewed and updated  Yes    Current Symptoms  None          TCM Call       Post hospital issues  None    Should patient be enrolled in anticoag monitoring?  No    Scheduled for follow up?  Yes    Patients specialists  Neurologist    Neurologist name  Follow up with [facility] [place] (Neurology); Please follow up with Neurology. The  will call you to set up an appointment. If you do not hear from the  within 1 week,    Did you obtain your prescribed medications  Yes    Do you need help managing your prescriptions or medications  No    Is transportation to your appointment needed  No    I have advised the patient to call PCP with any new or worsening symptoms  ERMA CARDENAS CMA    Living Arrangements  Family members    Support System  Family    The type of support provided  Emotional; Financial; Physical    Do you have social support  Yes, as much as I need    Are you recieving any outpatient services  No    What type of services  NURSES    Are you recieving home care services  No    Types of home care services  Nurse visit    Are you using any community resources  No    Current waiver services  No    Have you fallen in the last 12 months  No    Interperter language line needed  No    Counseling  Patient
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
## 2024-05-07 ENCOUNTER — TELEPHONE (OUTPATIENT)
Dept: VASCULAR SURGERY | Facility: CLINIC | Age: 64
End: 2024-05-07

## 2024-05-07 NOTE — TELEPHONE ENCOUNTER
Yovany Sanches; KARLOS Patient [employer]  LYFT  @ 14:50          Previous Messages       ----- Message -----  From: Marivel Sanches  Sent: 2024  12:00 AM EDT  To: Patient Betty  Subject: ghada sood                                        Patients Name: Nancy Jones  : 1960  Phone Number: [phone]  Appointment Date: 24  Appointment time: 3:30 pm   Address: [address] [place] [id number]  Drop off Facility/Office Name: [facility]  Drop off  Address: [address] [place] [id number]  Cost Center: [id number]  Special notes/directions for   Note for scheduling team
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
## 2024-05-13 ENCOUNTER — PATIENT OUTREACH (OUTPATIENT)
Dept: FAMILY MEDICINE CLINIC | Facility: CLINIC | Age: 64
End: 2024-05-13

## 2024-05-13 ENCOUNTER — OFFICE VISIT (OUTPATIENT)
Dept: VASCULAR SURGERY | Facility: CLINIC | Age: 64
End: 2024-05-13

## 2024-05-13 VITALS
HEIGHT: 64 IN | DIASTOLIC BLOOD PRESSURE: 80 MMHG | BODY MASS INDEX: 32.1 KG/M2 | WEIGHT: 188 LBS | OXYGEN SATURATION: 96 % | HEART RATE: 58 BPM | SYSTOLIC BLOOD PRESSURE: 124 MMHG

## 2024-05-13 DIAGNOSIS — I65.22 STENOSIS OF LEFT CAROTID ARTERY: Primary | ICD-10-CM

## 2024-05-13 DIAGNOSIS — I65.22 ASYMPTOMATIC STENOSIS OF LEFT CAROTID ARTERY: ICD-10-CM

## 2024-05-13 PROCEDURE — 99024 POSTOP FOLLOW-UP VISIT: CPT | Performed by: NURSE PRACTITIONER

## 2024-05-13 NOTE — PROGRESS NOTES
65 yo female ex-smoker w/ a hx of PAF (eliquis), HTN, familial hypercholesterolemia, aortic regurg, obesity, CAMILLE on CPAP, ventral hernia, GERD and IBS, presented to [facility] on 4/29/24 for a scheduled TCAR. Hx of an aborted L CEA in 1/2023, now L TCAR on 4/29/24 by .    Assessment/Plan:    Stenosis of left carotid artery  Pt returns to the office s/p L TCAR on 4/29/24 by     -Denies any complaints of pain at incision site.  -Denies fevers/ chills.   -Washing incision site with soap and water daily.  -L upper chest incision is clean, dry and will approximated with surgical glue in place over part of incision site.   -Denies pain, takes Tramadol PRN.   -Denies symptoms of numbness/ tingling/ weakness on one side of the body, facial droop, slurred speech or blindness in one eye.       Plan  -Complete carotid ultrasound in 6 weeks and return to the office for review with the vascular surgeon.  -Continue with Plavix daily.  -Eliquis for A-fib.  -Go to the ED/ Call 911 with any CVA/ TIA symptoms.        Diagnoses and all orders for this visit:    Stenosis of left carotid artery  -     VAS carotid complete study; Future    Asymptomatic stenosis of left carotid artery  -     VAS carotid complete study; Future          Subjective:      Patient ID: Nancy Jones is a 64 y.o. female.    Patient presents today s/p L TCAR done 4/29/2024 by Dr. García. Patient denies fever, chills, pain, trouble swallowing, hoarse voice. Incision looks clean and dry. Patient taking Plavix, Eliquis, and Repatha. Patient is a former smoker.     65 yo female ex-smoker w/ a hx of PAF (Eliquis), HTN, familial hypercholesterolemia, aortic regurg, obesity, CAMILLE on CPAP, ventral hernia, GERD and IBS, presented to [facility] on 4/29/24 for a scheduled TCAR. Hx of an aborted L CEA in 1/2023, now L TCAR on 4/29/24 by .    -Denies symptoms of numbness/ tingling/ weakness on one side of the body, facial droop, slurred speech or blindness  "in one eye.   -Reports she is washing her incision site daily.   -Currently takes tramadol PRN for pain management.  -Reports compliance with Plavix, Eliquis daily and repatha for lipid management.         The following portions of the patient's history were reviewed and updated as appropriate: allergies, current medications, past family history, past medical history, past social history, past surgical history, and problem list.    Review of Systems   Constitutional: Negative.    HENT: Negative.     Eyes: Negative.    Respiratory: Negative.     Cardiovascular: Negative.    Gastrointestinal: Negative.    Endocrine: Negative.    Genitourinary: Negative.    Musculoskeletal: Negative.    Skin: Negative.    Allergic/Immunologic: Negative.    Neurological: Negative.    Hematological: Negative.    Psychiatric/Behavioral: Negative.           Objective:      /80 (BP Location: Left arm, Patient Position: Sitting, Cuff Size: Adult)   Pulse 58   Ht 5' 4\" (1.626 m)   Wt 85.3 kg (188 lb) Comment: per pt  LMP  (LMP Unknown)   SpO2 96%   BMI 32.27 kg/m²          Physical Exam  Vitals and nursing note reviewed.   Constitutional:       General: She is not in acute distress.     Appearance: Normal appearance. She is obese. She is not ill-appearing.   HENT:      Head: Normocephalic and atraumatic.   Cardiovascular:      Rate and Rhythm: Normal rate and regular rhythm.      Pulses:           Radial pulses are 2+ on the right side and 1+ on the left side.      Heart sounds: Normal heart sounds. No murmur heard.  Pulmonary:      Effort: Pulmonary effort is normal. No respiratory distress.      Breath sounds: Normal breath sounds.   Skin:     General: Skin is warm and dry.   Neurological:      General: No focal deficit present.      Mental Status: She is alert and oriented to person, place, and time.      Sensory: No sensory deficit.      Motor: No weakness.      Gait: Gait normal.   Psychiatric:         Mood and Affect: Mood " "normal.         Behavior: Behavior normal.         I have reviewed and made appropriate changes to the review of systems input by the medical assistant.    Vitals:    05/13/24 1530   BP: 124/80   BP Location: Left arm   Patient Position: Sitting   Cuff Size: Adult   Pulse: 58   SpO2: 96%   Weight: 85.3 kg (188 lb)   Height: 5' 4\" (1.626 m)       Patient Active Problem List   Diagnosis    Angina pectoris (HCC)    Essential hypertension    Migraines    AMD (age-related macular degeneration), bilateral    Allergic reaction    Gastroesophageal reflux disease without esophagitis    Angio-edema    A-fib (HCC)    Lumbar radiculopathy    CAMILLE (obstructive sleep apnea)    Hypertensive heart disease with congestive heart failure (HCC)    Unspecified diastolic (congestive) heart failure (HCC)    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HCC)    COPD (chronic obstructive pulmonary disease) (HCC)    Urinary retention    Peripheral vascular disease (HCC)    Appendix disease    Carotid artery stenosis    Stenosis of left carotid artery    Asymptomatic stenosis of left carotid artery    Hepatic steatosis    Partial facial nerve palsy    Injury of left facial nerve    Paresthesia    Colitis    Chronic migraine w/o aura w/o status migrainosus, not intractable    IIH (idiopathic intracranial hypertension)    Hematochezia    Left carotid stenosis    Colonic ischemia (HCC)    Acute postoperative pain    S/P small bowel resection    Anxiety    Omental infarction (HCC)    Hypokalemia    Night sweats    Dysuria    Incisional hernia, without obstruction or gangrene    Stage 3 chronic kidney disease, unspecified whether stage 3a or 3b CKD (HCC)    Ventral hernia without obstruction or gangrene    Atherosclerosis of native arteries of extremities with rest pain, bilateral legs (HCC)    Acute on chronic diastolic (congestive) heart failure (HCC)    Left facial numbness    Other chest pain    Acute pain of left knee    Acute lateral meniscus tear of left " knee    Continuous opioid dependence (HCC)    Obesity, morbid (HCC)       Past Surgical History:   Procedure Laterality Date    COLONOSCOPY      DILATION AND CURETTAGE OF UTERUS      EGD      EXPLORATORY LAPAROTOMY      for infertility    EXPLORATORY LAPAROTOMY W/ BOWEL RESECTION N/A 7/19/2023    Procedure: LAPAROTOMY EXPLORATORY W/ BOWEL RESECTION;  Surgeon: Crista Recinos MD;  [facility];  Service: General    HAND SURGERY      Hand excision of tendon cyst    IA LAPAROSCOPIC APPENDECTOMY N/A 05/03/2022    Procedure: APPENDECTOMY LAPAROSCOPIC;  Surgeon: Vin Fields MD;  [facility];  Service: General    IA LAPS ABD PRTM&OMENTUM DX W/WO SPEC BR/WA SPX N/A 7/19/2023    Procedure: LAPAROSCOPY DIAGNOSTIC, LYSIS OF ADHESIONS, LAPAROSCOPY TAKE TAKEDOWN OF LEFT COLON, EXPLORATORY LAPAROSCOPY, LYSIS OF ADHESIONS, RESECTION OF TRANSVERSE COLON SPLENIC FLEXURE AND DESCENDING COLON , TAKE DOWN OF SPLENIC FLEXURE, SIGMOIDOSCOPY, MOBILIZATION OF RIGHT COLON, PRIMARY ANASTOMOSIS EEA 29, TAP BLOCK;  Surgeon: Crista Recinos MD;  [facility];  Service: General    IA TCAT IV STENT CRV CRTD ART EMBOLIC PROTECJ Left 4/29/2024    Procedure: ANGIOPLASTY ARTERY CAROTID W/ STENT;  Surgeon: Roberto García DO;  [facility];  Service: Vascular    IA TEAEC W/PATCH GRF CAROTID VERTB SUBCLAV NECK INC Left 1/31/2023    Procedure: EXPLORATION OF LEFT CAROTID ARTERY; ABORTED ENDARTERECTOMY ARTERY CAROTID;  Surgeon: Roberto García DO;  [facility];  Service: Vascular    SUPERIOR MESTENTERIC ARTERY STENT      WISDOM TOOTH EXTRACTION         Family History   Problem Relation Age of Onset    Lung cancer Mother 60    Brain cancer Mother 60    Diabetes Father     Depression Father     Other Father         septic    Allergies Sister     Hashimoto's thyroiditis Sister     Abdominal aortic aneurysm Sister     Diabetes Sister     No Known Problems Sister     No Known Problems Maternal Grandmother      No Known Problems Maternal Grandfather     No Known Problems Paternal Grandmother     No Known Problems Paternal Grandfather     Hodgkin's lymphoma Brother     No Known Problems Brother     No Known Problems Maternal Aunt     Diabetes Other     Breast cancer Neg Hx        Social History     Socioeconomic History    Marital status:      Spouse name: Not on file    Number of children: 0    Years of education: Not on file    Highest education level: Not on file   Occupational History    Occupation: retired   Tobacco Use    Smoking status: Former     Current packs/day: 0.00     Average packs/day: 0.5 packs/day for 10.0 years (5.0 ttl pk-yrs)     Types: Cigarettes     Start date:      Quit date:      Years since quittin.3     Passive exposure: Past    Smokeless tobacco: Never   Vaping Use    Vaping status: Never Used   Substance and Sexual Activity    Alcohol use: Not Currently    Drug use: No    Sexual activity: Not Currently     Comment: defer   Other Topics Concern    Not on file   Social History Narrative    Who lives in your home: Alone     What type of home do you live in: Apartment     Age of your home: 1952 built     How long have you been living there: 5 yrs     Type of heat: forced hot air     Type of fuel: electric     What type of jaimn is in your bedroom: carpet jamin     Do you have the following in or near your home:    Air products: Humidifier in the bedroom/kitchen     Pests: none     Pets: none     Are pets allowed in bedroom: N/A     Open fields, wooded areas nearby: No     Basement: [id number]-no mold     Exposure to second hand smoke: No    Central air     Habits:    Caffeine: Hot tea 1 cup daily- ice tea 1 cup in the afternoon    Chocolate: Occasionally      Social Determinants of Health     Financial Resource Strain: Medium Risk (10/2/2023)    Overall Financial Resource Strain (CARDIA)     Difficulty of Paying Living Expenses: Somewhat hard   Food  Insecurity: No Food Insecurity (2/21/2023)    Hunger Vital Sign     Worried About Running Out of Food in the Last Year: Never true     Ran Out of Food in the Last Year: Never true   Transportation Needs: No Transportation Needs (10/2/2023)    PRAPARE - Transportation     Lack of Transportation (Medical): No     Lack of Transportation (Non-Medical): No   Physical Activity: Inactive (5/24/2021)    Exercise Vital Sign     Days of Exercise per Week: 0 days     Minutes of Exercise per Session: 0 min   Stress: Not on file   Social Connections: Moderately Isolated (5/24/2021)    Social Connection and Isolation Panel [NHANES]     Frequency of Communication with Friends and Family: More than three times a week     Frequency of Social Gatherings with Friends and Family: More than three times a week     Attends [employer] Services: 1 to 4 times per year     Active Member of Clubs or Organizations: No     Attends Club or Organization Meetings: Never     Marital Status: Never    Intimate Partner Violence: Not At Risk (5/24/2021)    Humiliation, Afraid, Rape, and Kick questionnaire     Fear of Current or Ex-Partner: No     Emotionally Abused: No     Physically Abused: No     Sexually Abused: No   Housing Stability: Low Risk  (7/20/2023)    Housing Stability Vital Sign     Unable to Pay for Housing in the Last Year: No     Number of Places Lived in the Last Year: 1     Unstable Housing in the Last Year: No       Allergies   Allergen Reactions    Ace Inhibitors Angioedema and Anaphylaxis     Anaphylaxis    Benicar [Olmesartan] Angioedema     See Allergy note from 9/11/2008.  Swollen ankles/legs    Hydralazine Other (See Comments)     Lip swelling  Flank pain    Other Anaphylaxis and Other (See Comments)     Preservatives- itching throat closes, hi  E Z Cat scan contrast - hives throat closes itching,  Preservatives- itching throat closes, hi  Artificial sweeteners    Valsartan Angioedema     Lips, face swollen    Ampicillin  "Hives     Depends on brand some preservatives can react. Has recently tolerated oral amoxicillin.    Sulfa Antibiotics Hives     stuffiness,itching,hives,throat closing--per pt \"sometimes able to take depending on the brand and the filler or possibly preservatives in it\"    Motrin [Ibuprofen] Other (See Comments)     Per pt \" told not to take due to A Fib\"    Wound Dressing Adhesive Other (See Comments)     Per pt Adhesive on EKG leads caused redness         Current Outpatient Medications:     acetaminophen (TYLENOL) 325 mg tablet, Take 2 tablets (650 mg total) by mouth every 6 (six) hours, Disp: , Rfl: 0    amiodarone 100 mg tablet, Take 1 tablet (100 mg total) by mouth daily, Disp: 90 tablet, Rfl: 3    chlordiazepoxide-clidinium (LIBRAX) 5-2.5 mg per capsule, Take 1 capsule by mouth daily as needed for cramping, Disp: 90 capsule, Rfl: 0    clopidogrel (Plavix) 75 mg tablet, Take 1 tablet (75 mg total) by mouth daily, Disp: 30 tablet, Rfl: 3    Diclofenac Sodium (VOLTAREN) 1 %, Apply 2 g topically 4 (four) times a day, Disp: 100 g, Rfl: 2    diltiazem (CARDIZEM CD) 120 mg 24 hr capsule, TAKE ONE CAPSULE BY MOUTH ONCE DAILY, Disp: 30 capsule, Rfl: 3    Docusate Sodium (COLACE PO), Take by mouth daily at bedtime, Disp: , Rfl:     Eliquis 5 MG, TAKE ONE TABLET BY MOUTH TWICE DAILY, Disp: 180 tablet, Rfl: 3    Evolocumab (Repatha SureClick) 140 MG/ML SOAJ, Inject 1 mL (140 mg total) under the skin every 14 (fourteen) days, Disp: 2 mL, Rfl: 11    fexofenadine (ALLEGRA) 180 MG tablet, Take 180 mg by mouth daily, Disp: , Rfl:     fluticasone-vilanterol (BREO ELLIPTA) 200-25 MCG/INH inhaler, Inhale 1 puff daily Rinse mouth after use., Disp: 3 Inhaler, Rfl: 3    furosemide (LASIX) 40 mg tablet, Take 1 tablet (40 mg total) by mouth daily, Disp: 90 tablet, Rfl: 0    LORazepam (ATIVAN) 1 mg tablet, Take 1 tablet (1 mg total) by mouth 3 (three) times a day, Disp: 90 tablet, Rfl: 0    LORazepam (ATIVAN) 2 mg tablet, Take 1 " tablet (2 mg total) by mouth every 8 (eight) hours as needed for anxiety, Disp: 21 tablet, Rfl: 0    magnesium Oxide (MAG-OX) 400 mg TABS, Take 1 tablet (400 mg total) by mouth daily at bedtime (Patient not taking: Reported on 4/10/2024), Disp: 90 tablet, Rfl: 3    methocarbamol (ROBAXIN) 500 mg tablet, Take 1 tablet (500 mg total) by mouth 3 (three) times a day, Disp: 90 tablet, Rfl: 5    metoprolol succinate (TOPROL-XL) 100 mg 24 hr tablet, Take 1 tablet (100 mg total) by mouth 2 (two) times a day, Disp: 180 tablet, Rfl: 1    omeprazole (PriLOSEC) 40 MG capsule, Take 1 capsule (40 mg total) by mouth 2 (two) times a day 30 minutes before breakfast and dinner, Disp: 60 capsule, Rfl: 3    ondansetron (ZOFRAN-ODT) 8 mg disintegrating tablet, Take 1 tablet (8 mg total) by mouth every 12 (twelve) hours as needed for nausea or vomiting, Disp: 30 tablet, Rfl: 2    phenazopyridine (PYRIDIUM) 200 mg tablet, Take 1 tablet (200 mg total) by mouth 2 (two) times a day as needed for bladder spasms, Disp: 30 tablet, Rfl: 0    polyethylene glycol (MIRALAX) 17 g packet, Take 17 g by mouth daily as needed (constipation), Disp: , Rfl:     promethazine (PHENERGAN) 25 mg tablet, Take 1 tablet (25 mg total) by mouth every 6 (six) hours as needed for nausea or vomiting, Disp: 60 tablet, Rfl: 5    senna (SENOKOT) 8.6 mg, Take 1 tablet (8.6 mg total) by mouth daily as needed for constipation, Disp: , Rfl:     spironolactone (ALDACTONE) 25 mg tablet, TAKE ONE TABLET BY MOUTH TWICE DAILY, Disp: 180 tablet, Rfl: 1    traMADol (ULTRAM) 50 mg tablet, Take 2 tablets (100 mg total) by mouth every 8 (eight) hours as needed for moderate pain, Disp: 180 tablet, Rfl: 0    triamcinolone (KENALOG) 0.1 % cream, Apply topically 2 (two) times a day, Disp: 15 g, Rfl: 2    Current Facility-Administered Medications:     cyanocobalamin injection 1,000 mcg, 1,000 mcg, Intramuscular, Q30 Days, Coy Ayoub DO, 1,000 mcg at 11/16/23 1151  I have spent a total  time of 20 minutes on 05/13/24 in caring for this patient including Risks and benefits of tx options, Instructions for management, Patient and family education, Importance of tx compliance, Risk factor reductions, Counseling / Coordination of care, Documenting in the medical record, and Communicating with other healthcare professionals .

## 2024-05-13 NOTE — ASSESSMENT & PLAN NOTE
Pt returns to the office s/p L TCAR on 4/29/24 by     -Denies any complaints of pain at incision site.  -Denies fevers/ chills.   -Washing incision site with soap and water daily.  -L upper chest incision is clean, dry and will approximated with surgical glue in place over part of incision site.   -Denies pain, takes Tramadol PRN.   -Denies symptoms of numbness/ tingling/ weakness on one side of the body, facial droop, slurred speech or blindness in one eye.       Plan  -Complete carotid ultrasound in 6 weeks and return to the office for review with the vascular surgeon.  -Continue with Plavix daily.  -Eliquis for A-fib.  -Go to the ED/ Call 911 with any CVA/ TIA symptoms.

## 2024-05-14 DIAGNOSIS — K21.9 GASTROESOPHAGEAL REFLUX DISEASE WITHOUT ESOPHAGITIS: ICD-10-CM

## 2024-05-14 DIAGNOSIS — I48.0 PAROXYSMAL ATRIAL FIBRILLATION (HCC): ICD-10-CM

## 2024-05-14 RX ORDER — CHLORDIAZEPOXIDE HYDROCHLORIDE AND CLIDINIUM BROMIDE 5; 2.5 MG/1; MG/1
1 CAPSULE ORAL DAILY PRN
Qty: 90 CAPSULE | Refills: 0 | Status: SHIPPED | OUTPATIENT
Start: 2024-05-14

## 2024-05-15 RX ORDER — DILTIAZEM HYDROCHLORIDE 120 MG/1
120 CAPSULE, COATED, EXTENDED RELEASE ORAL DAILY
Qty: 90 CAPSULE | Refills: 1 | Status: SHIPPED | OUTPATIENT
Start: 2024-05-15

## 2024-05-16 ENCOUNTER — TELEPHONE (OUTPATIENT)
Age: 64
End: 2024-05-16

## 2024-05-16 ENCOUNTER — TELEPHONE (OUTPATIENT)
Dept: CARDIOLOGY CLINIC | Facility: CLINIC | Age: 64
End: 2024-05-16

## 2024-05-16 NOTE — TELEPHONE ENCOUNTER
Fax received from [employer].  Forms scanned to [facility] Prior Auth request  Last office visit 4/10/24 Dr. Lynn

## 2024-05-16 NOTE — TELEPHONE ENCOUNTER
PA for REPATHA 140 MG/ML    Submitted via    []CMM-KEY   []SurescUskape-Case ID #   []Faxed to plan   [x]Other website [employer] PROMPT PA  EOC  [id number]  []Phone call Case ID #     Office notes sent, clinical questions answered. Awaiting determination    Turnaround time for your insurance to make a decision on your Prior Authorization can take 7-21 business days.

## 2024-05-16 NOTE — TELEPHONE ENCOUNTER
Spoke with Alli ([phone]) from [facility] regarding information needed for prior authorization for Repatha.     Additional information needed: documentation that patient has not had any adverse events to the medication and they need a recent Lipid Panel done to show if any improvement in her levels.    Prior authorization request they have is set to  in the AM. With approval from provider we can have them withdraw the request until the additional information is obtained.    Please call Alli to let her know if the request can be withdrawn.    Please advise.

## 2024-05-17 ENCOUNTER — TELEPHONE (OUTPATIENT)
Age: 64
End: 2024-05-17

## 2024-05-17 ENCOUNTER — APPOINTMENT (OUTPATIENT)
Dept: LAB | Facility: HOSPITAL | Age: 64
End: 2024-05-17
Payer: COMMERCIAL

## 2024-05-17 ENCOUNTER — TELEPHONE (OUTPATIENT)
Dept: UROLOGY | Facility: CLINIC | Age: 64
End: 2024-05-17

## 2024-05-17 DIAGNOSIS — N39.0 RECURRENT UTI: Primary | ICD-10-CM

## 2024-05-17 LAB
BACTERIA UR QL AUTO: ABNORMAL /HPF
BILIRUB UR QL STRIP: ABNORMAL
CLARITY UR: ABNORMAL
COLOR UR: ABNORMAL
GLUCOSE UR STRIP-MCNC: NEGATIVE MG/DL
HGB UR QL STRIP.AUTO: ABNORMAL
HYALINE CASTS #/AREA URNS LPF: ABNORMAL /LPF
KETONES UR STRIP-MCNC: NEGATIVE MG/DL
LEUKOCYTE ESTERASE UR QL STRIP: ABNORMAL
NITRITE UR QL STRIP: POSITIVE
NON-SQ EPI CELLS URNS QL MICRO: ABNORMAL /HPF
PH UR STRIP.AUTO: 7.5 [PH]
PROT UR STRIP-MCNC: NEGATIVE MG/DL
RBC #/AREA URNS AUTO: ABNORMAL /HPF
SP GR UR STRIP.AUTO: 1.01 (ref 1–1.03)
UROBILINOGEN UR STRIP-ACNC: 3 MG/DL
WBC #/AREA URNS AUTO: ABNORMAL /HPF
WBC CLUMPS # UR AUTO: PRESENT /UL

## 2024-05-17 PROCEDURE — 87186 SC STD MICRODIL/AGAR DIL: CPT

## 2024-05-17 PROCEDURE — 81001 URINALYSIS AUTO W/SCOPE: CPT

## 2024-05-17 PROCEDURE — 87086 URINE CULTURE/COLONY COUNT: CPT

## 2024-05-17 PROCEDURE — 87077 CULTURE AEROBIC IDENTIFY: CPT

## 2024-05-17 RX ORDER — GRANULES FOR ORAL 3 G/1
3 POWDER ORAL EVERY OTHER DAY
Qty: 6 G | Refills: 0 | Status: SHIPPED | OUTPATIENT
Start: 2024-05-17 | End: 2024-05-20

## 2024-05-17 NOTE — TELEPHONE ENCOUNTER
Left detailed message on positive UA and medication was sent to [facility] [place].

## 2024-05-17 NOTE — TELEPHONE ENCOUNTER
Please let patient know I have ordered another lipid panel for her, as it has been about a year since her last one.  Has she been taking Repatha regularly? When was her last dose?  Thank you    RP

## 2024-05-17 NOTE — TELEPHONE ENCOUNTER
----- Message from JUD Torre sent at 5/17/2024 12:21 PM EDT -----  Patient's preliminary urine testing appears positive for infection.  She has history of ESBL E. coli UTIs with MDRO.  Due to upcoming weekend I sent a prescription from fosfomycin 3 g every 48 hours x 2 doses.

## 2024-05-17 NOTE — TELEPHONE ENCOUNTER
Pt requesting to have urine testing ordered for uti symptoms which started yesterday with back pain and this morning burning before during and after urination,please contact her directly as she would like to get testing this morning.

## 2024-05-17 NOTE — TELEPHONE ENCOUNTER
Called and LMOM for patient that orders for urine testing were placed in her chart and she can present to any [facility] to provide a urine sample. Office to call with results.

## 2024-05-17 NOTE — RESULT ENCOUNTER NOTE
Patient's preliminary urine testing appears positive for infection.  She has history of ESBL E. coli UTIs with MDRO.  Due to upcoming weekend I sent a prescription from fosfomycin 3 g every 48 hours x 2 doses.

## 2024-05-19 LAB — BACTERIA UR CULT: ABNORMAL

## 2024-05-20 ENCOUNTER — PATIENT OUTREACH (OUTPATIENT)
Dept: FAMILY MEDICINE CLINIC | Facility: CLINIC | Age: 64
End: 2024-05-20

## 2024-05-20 NOTE — TELEPHONE ENCOUNTER
Placed call to patient. She verbalized understanding. She states she will get done tomorrow morning.

## 2024-05-20 NOTE — PROGRESS NOTES
Contacted patient for f/u.  She states she is currently not feeling well, having nausea.  Arranged to contact her back another time.

## 2024-05-22 ENCOUNTER — APPOINTMENT (OUTPATIENT)
Dept: LAB | Facility: HOSPITAL | Age: 64
End: 2024-05-22
Payer: COMMERCIAL

## 2024-05-22 DIAGNOSIS — E78.5 DYSLIPIDEMIA: ICD-10-CM

## 2024-05-22 LAB
CHOLEST SERPL-MCNC: 198 MG/DL
HDLC SERPL-MCNC: 56 MG/DL
LDLC SERPL CALC-MCNC: 109 MG/DL (ref 0–100)
TRIGL SERPL-MCNC: 167 MG/DL

## 2024-05-22 PROCEDURE — 36415 COLL VENOUS BLD VENIPUNCTURE: CPT

## 2024-05-22 PROCEDURE — 80061 LIPID PANEL: CPT

## 2024-05-23 ENCOUNTER — PATIENT OUTREACH (OUTPATIENT)
Dept: FAMILY MEDICINE CLINIC | Facility: CLINIC | Age: 64
End: 2024-05-23

## 2024-05-23 DIAGNOSIS — F41.9 ANXIETY: ICD-10-CM

## 2024-05-23 DIAGNOSIS — N30.10 INTERSTITIAL CYSTITIS: ICD-10-CM

## 2024-05-23 RX ORDER — LORAZEPAM 1 MG/1
1 TABLET ORAL 3 TIMES DAILY
Qty: 90 TABLET | Refills: 0 | Status: SHIPPED | OUTPATIENT
Start: 2024-05-23

## 2024-05-23 RX ORDER — PHENAZOPYRIDINE HYDROCHLORIDE 200 MG/1
200 TABLET, FILM COATED ORAL 2 TIMES DAILY PRN
Qty: 30 TABLET | Refills: 0 | Status: CANCELLED | OUTPATIENT
Start: 2024-05-23

## 2024-05-23 NOTE — TELEPHONE ENCOUNTER
Patient with history of chronic interstitial cystitis. She is experiencing cloudy urine with odor present. She completed both cycles of fosamycin. But symptoms are better but they are still present. Does she need another cycle? Pelvic and abdominal pain with cramping. No urinary urgency or frequency and no hematuria. Slight nausea at times. Please advise of further recommendations. Care advice given to hydrate well water and avoid bladder irritants and reviewed ER precautions.

## 2024-05-23 NOTE — PROGRESS NOTES
Contacted patient for f/u.  She had recent UTI and took 2 doses of fosfomycin.  Her last dose was Monday but she continues to experience burning during and after urination and frequency.  She denies odor.  She plans to contact urology to inform.  Instructed her to continue to force fluids.  Weight today was 185, she has lost some weight.  No c/o chest pain, sob or LE edema.  She denies any incision pain post L TCAR.  She will occasionally take a tramadol when experiencing discomfort.  She reports not having much of an appetite.  She is having anxiety, verified she is taking ativan three times daily.  She would like to have the ativan be increased for one dose during the day, she feels it would help and will discuss with PCP.  She is taking all medications as prescribed.  Follow up appointments scheduled.  No transportation issues.  She denies needing help at home.  Agreeable to continued outreach.

## 2024-05-23 NOTE — TELEPHONE ENCOUNTER
Pt calling to stated that she is still having burning, lower back pain, cloudy/ foul odor urine and nausea     Pt just finished abx on Monday.    Please advise pt can be reached at [phone]

## 2024-05-24 ENCOUNTER — OFFICE VISIT (OUTPATIENT)
Dept: OBGYN CLINIC | Facility: MEDICAL CENTER | Age: 64
End: 2024-05-24
Payer: COMMERCIAL

## 2024-05-24 VITALS
DIASTOLIC BLOOD PRESSURE: 80 MMHG | HEART RATE: 58 BPM | BODY MASS INDEX: 31.76 KG/M2 | SYSTOLIC BLOOD PRESSURE: 124 MMHG | WEIGHT: 186 LBS | HEIGHT: 64 IN

## 2024-05-24 DIAGNOSIS — S83.272A COMPLEX TEAR OF LATERAL MENISCUS OF LEFT KNEE AS CURRENT INJURY, INITIAL ENCOUNTER: ICD-10-CM

## 2024-05-24 DIAGNOSIS — M25.562 ACUTE PAIN OF LEFT KNEE: Primary | ICD-10-CM

## 2024-05-24 DIAGNOSIS — K21.9 GASTROESOPHAGEAL REFLUX DISEASE WITHOUT ESOPHAGITIS: ICD-10-CM

## 2024-05-24 DIAGNOSIS — M17.12 PRIMARY OSTEOARTHRITIS OF LEFT KNEE: ICD-10-CM

## 2024-05-24 PROCEDURE — 99203 OFFICE O/P NEW LOW 30 MIN: CPT | Performed by: EMERGENCY MEDICINE

## 2024-05-24 RX ORDER — OMEPRAZOLE 40 MG/1
CAPSULE, DELAYED RELEASE ORAL
Qty: 60 CAPSULE | Refills: 5 | Status: SHIPPED | OUTPATIENT
Start: 2024-05-24

## 2024-05-24 NOTE — TELEPHONE ENCOUNTER
Pt calling back due to ongoing uti symptoms. Pt is still having burning while urinating, slow stream, darker urine and feels like bladder cramps. Pt is concerned and wants to know if she should have abx sent over before the weekend.     Please review and advise pt how to proceed.     Pt call back- [phone]

## 2024-05-24 NOTE — TELEPHONE ENCOUNTER
Left a voicemail to inform patient that an AP sent Pyridium to the pharmacy of [facility].

## 2024-05-24 NOTE — TELEPHONE ENCOUNTER
Pt called about medication and about having antibiotic sent in for the weekend. Pt stated that she has a slow stream t also stated that she does not feel like she is fully emptying     Please review previous note and advise pt can be reached at [phone]

## 2024-05-24 NOTE — TELEPHONE ENCOUNTER
Spoke to pharmacist. They never received AP order for pyridium through the e-scripting. Please resend orders.

## 2024-05-24 NOTE — PROGRESS NOTES
Assessment/Plan:    Diagnoses and all orders for this visit:    Acute pain of left knee  -     Durable Medical Equipment    Complex tear of lateral meniscus of left knee as current injury, initial encounter  -     Ambulatory Referral to Orthopedic Surgery  -     Durable Medical Equipment    Primary osteoarthritis of left knee    Left knee symptoms improved, reviewed Xrays and MRI.  There is a complex tear of the lateral meniscus however I am not certain that this is causing her pain.  Thankfully she is not experiencing any mechanical symptoms.  At this time recommend continue to monitor, recommend knee sleeve, t/c CSI, check with PCP regarding NSAIDs    No follow-ups on file.      Subjective:   Patient ID: Nancy Jones is a 64 y.o. female.    NP presents for 2 months of left knee pain.  She states she bumped it against a sharp edge of furniture which caused pain however about a week lateral pain increased. Pain is intermittent, maybe worse with prolonged walking.  Denies painful p/c, no instability.  Takes Tramadol or IBU PRN (However A fib)  She did have some pain with stairs prior to injury.          Review of Systems    The following portions of the patient's chart were reviewed and updated as appropriate:   Allergy:    Allergies   Allergen Reactions    Ace Inhibitors Angioedema and Anaphylaxis     Anaphylaxis    Benicar [Olmesartan] Angioedema     See Allergy note from 9/11/2008.  Swollen ankles/legs    Hydralazine Other (See Comments)     Lip swelling  Flank pain    Other Anaphylaxis and Other (See Comments)     Preservatives- itching throat closes, hi  E Z Cat scan contrast - hives throat closes itching,  Preservatives- itching throat closes, hi  Artificial sweeteners    Valsartan Angioedema     Lips, face swollen    Ampicillin Hives     Depends on brand some preservatives can react. Has recently tolerated oral amoxicillin.    Sulfa Antibiotics Hives     stuffiness,itching,hives,throat closing--per pt  "\"sometimes able to take depending on the brand and the filler or possibly preservatives in it\"    Motrin [Ibuprofen] Other (See Comments)     Per pt \" told not to take due to A Fib\"    Wound Dressing Adhesive Other (See Comments)     Per pt Adhesive on EKG leads caused redness       Medications:    Current Outpatient Medications:     acetaminophen (TYLENOL) 325 mg tablet, Take 2 tablets (650 mg total) by mouth every 6 (six) hours, Disp: , Rfl: 0    amiodarone 100 mg tablet, Take 1 tablet (100 mg total) by mouth daily, Disp: 90 tablet, Rfl: 3    chlordiazepoxide-clidinium (LIBRAX) 5-2.5 mg per capsule, Take 1 capsule by mouth daily as needed for cramping, Disp: 90 capsule, Rfl: 0    clopidogrel (Plavix) 75 mg tablet, Take 1 tablet (75 mg total) by mouth daily, Disp: 30 tablet, Rfl: 3    Diclofenac Sodium (VOLTAREN) 1 %, Apply 2 g topically 4 (four) times a day, Disp: 100 g, Rfl: 2    diltiazem (CARDIZEM CD) 120 mg 24 hr capsule, TAKE ONE CAPSULE BY MOUTH ONCE DAILY, Disp: 90 capsule, Rfl: 1    Docusate Sodium (COLACE PO), Take by mouth daily at bedtime, Disp: , Rfl:     Eliquis 5 MG, TAKE ONE TABLET BY MOUTH TWICE DAILY, Disp: 180 tablet, Rfl: 3    Evolocumab (Repatha SureClick) 140 MG/ML SOAJ, Inject 1 mL (140 mg total) under the skin every 14 (fourteen) days, Disp: 2 mL, Rfl: 11    fexofenadine (ALLEGRA) 180 MG tablet, Take 180 mg by mouth daily, Disp: , Rfl:     fluticasone-vilanterol (BREO ELLIPTA) 200-25 MCG/INH inhaler, Inhale 1 puff daily Rinse mouth after use., Disp: 3 Inhaler, Rfl: 3    furosemide (LASIX) 40 mg tablet, Take 1 tablet (40 mg total) by mouth daily, Disp: 90 tablet, Rfl: 0    LORazepam (ATIVAN) 1 mg tablet, Take 1 tablet (1 mg total) by mouth 3 (three) times a day, Disp: 90 tablet, Rfl: 0    LORazepam (ATIVAN) 2 mg tablet, Take 1 tablet (2 mg total) by mouth every 8 (eight) hours as needed for anxiety, Disp: 21 tablet, Rfl: 0    methocarbamol (ROBAXIN) 500 mg tablet, Take 1 tablet (500 mg total) " by mouth 3 (three) times a day, Disp: 90 tablet, Rfl: 5    metoprolol succinate (TOPROL-XL) 100 mg 24 hr tablet, Take 1 tablet (100 mg total) by mouth 2 (two) times a day, Disp: 180 tablet, Rfl: 1    omeprazole (PriLOSEC) 40 MG capsule, Take 1 capsule (40 mg total) by mouth 2 (two) times a day 30 minutes before breakfast and dinner, Disp: 60 capsule, Rfl: 3    ondansetron (ZOFRAN-ODT) 8 mg disintegrating tablet, Take 1 tablet (8 mg total) by mouth every 12 (twelve) hours as needed for nausea or vomiting, Disp: 30 tablet, Rfl: 2    phenazopyridine (PYRIDIUM) 200 mg tablet, Take 1 tablet (200 mg total) by mouth 2 (two) times a day as needed for bladder spasms, Disp: 30 tablet, Rfl: 0    polyethylene glycol (MIRALAX) 17 g packet, Take 17 g by mouth daily as needed (constipation), Disp: , Rfl:     promethazine (PHENERGAN) 25 mg tablet, Take 1 tablet (25 mg total) by mouth every 6 (six) hours as needed for nausea or vomiting, Disp: 60 tablet, Rfl: 5    senna (SENOKOT) 8.6 mg, Take 1 tablet (8.6 mg total) by mouth daily as needed for constipation, Disp: , Rfl:     spironolactone (ALDACTONE) 25 mg tablet, TAKE ONE TABLET BY MOUTH TWICE DAILY, Disp: 180 tablet, Rfl: 1    traMADol (ULTRAM) 50 mg tablet, Take 2 tablets (100 mg total) by mouth every 8 (eight) hours as needed for moderate pain, Disp: 180 tablet, Rfl: 0    triamcinolone (KENALOG) 0.1 % cream, Apply topically 2 (two) times a day, Disp: 15 g, Rfl: 2    magnesium Oxide (MAG-OX) 400 mg TABS, Take 1 tablet (400 mg total) by mouth daily at bedtime (Patient not taking: Reported on 4/10/2024), Disp: 90 tablet, Rfl: 3    Current Facility-Administered Medications:     cyanocobalamin injection 1,000 mcg, 1,000 mcg, Intramuscular, Q30 Days, Coy Ayoub DO, 1,000 mcg at 11/16/23 1151    Patient Active Problem List   Diagnosis    Angina pectoris (HCC)    Essential hypertension    Migraines    AMD (age-related macular degeneration), bilateral    Allergic reaction     "Gastroesophageal reflux disease without esophagitis    Angio-edema    A-fib (HCC)    Lumbar radiculopathy    CAMILLE (obstructive sleep apnea)    Hypertensive heart disease with congestive heart failure (HCC)    Unspecified diastolic (congestive) heart failure (HCC)    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HCC)    COPD (chronic obstructive pulmonary disease) (HCC)    Urinary retention    Peripheral vascular disease (HCC)    Appendix disease    Carotid artery stenosis    Stenosis of left carotid artery    Asymptomatic stenosis of left carotid artery    Hepatic steatosis    Partial facial nerve palsy    Injury of left facial nerve    Paresthesia    Colitis    Chronic migraine w/o aura w/o status migrainosus, not intractable    IIH (idiopathic intracranial hypertension)    Hematochezia    Left carotid stenosis    Colonic ischemia (HCC)    Acute postoperative pain    S/P small bowel resection    Anxiety    Omental infarction (HCC)    Hypokalemia    Night sweats    Dysuria    Incisional hernia, without obstruction or gangrene    Stage 3 chronic kidney disease, unspecified whether stage 3a or 3b CKD (HCC)    Ventral hernia without obstruction or gangrene    Atherosclerosis of native arteries of extremities with rest pain, bilateral legs (HCC)    Acute on chronic diastolic (congestive) heart failure (HCC)    Left facial numbness    Other chest pain    Acute pain of left knee    Acute lateral meniscus tear of left knee    Continuous opioid dependence (HCC)    Obesity, morbid (HCC)    Complex tear of lateral meniscus of left knee as current injury, initial encounter       Objective:  Ht 5' 4\" (1.626 m)   Wt 84.4 kg (186 lb)   LMP  (LMP Unknown)   BMI 31.93 kg/m²     Left Knee Exam     Other   Erythema: absent  Sensation: normal  Swelling: mild    Comments:  There is mild localized swelling of the anterior joint line just lateral to the patellar tendon this area is tender to palpation.            Physical Exam      Neurologic " "Exam    Procedures    I have personally reviewed the written report and images of the pertinent studies.             Past Medical History:   Diagnosis Date    A-fib (HCC) 03/2020    Allergic     Anxiety     Arthritis     Asthma     Back pain     and muscle pain    Bruises easily     Chronic pain disorder     Interstitial pain    Colon polyp     Dental bridge present     Depression     Eczema     GERD (gastroesophageal reflux disease)     Heart murmur     History of blood clots     per pt \"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\"    History of pneumonia     Hives     Hyperlipidemia     Hypertension     Infertility, female     Interstitial cystitis     Migraine     sees neurologist    Motion sickness     Obesity     Palpitations     PONV (postoperative nausea and vomiting)     Premature atrial contractions 11/09/2022    Psychiatric disorder     TIA (transient ischemic attack) 09/2022    per pt --\"had MRI and 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\"    Urinary incontinence     wears Pads    Venous insufficiency 08/01/2017    Wears glasses        Past Surgical History:   Procedure Laterality Date    COLONOSCOPY      DILATION AND CURETTAGE OF UTERUS      EGD      EXPLORATORY LAPAROTOMY      for infertility    EXPLORATORY LAPAROTOMY W/ BOWEL RESECTION N/A 7/19/2023    Procedure: LAPAROTOMY EXPLORATORY W/ BOWEL RESECTION;  Surgeon: Crista Recinos MD;  Location: [facility];  Service: General    HAND SURGERY      Hand excision of tendon cyst    VT LAPAROSCOPIC APPENDECTOMY N/A 05/03/2022    Procedure: APPENDECTOMY LAPAROSCOPIC;  Surgeon: Vin Fields MD;  Location: [facility];  Service: General    VT LAPS ABD PRTM&OMENTUM DX W/WO SPEC BR/WA SPX N/A 7/19/2023    Procedure: LAPAROSCOPY DIAGNOSTIC, LYSIS OF ADHESIONS, LAPAROSCOPY TAKE TAKEDOWN OF LEFT COLON, EXPLORATORY LAPAROSCOPY, LYSIS OF ADHESIONS, RESECTION OF TRANSVERSE COLON SPLENIC FLEXURE AND DESCENDING COLON , TAKE DOWN OF SPLENIC FLEXURE, " SIGMOIDOSCOPY, MOBILIZATION OF RIGHT COLON, PRIMARY ANASTOMOSIS EEA 29, TAP BLOCK;  Surgeon: Crista Recinos MD;  Location[facility];  Service: General    WV TCAT IV STENT CRV CRTD ART EMBOLIC PROTECJ Left 2024    Procedure: ANGIOPLASTY ARTERY CAROTID W/ STENT;  Surgeon: Roberto García DO;  Location: [facility];  Service: Vascular    WV TEAEC W/PATCH GRF CAROTID VERTB SUBCLAV NECK INC Left 2023    Procedure: EXPLORATION OF LEFT CAROTID ARTERY; ABORTED ENDARTERECTOMY ARTERY CAROTID;  Surgeon: Roberto García DO;  Location: [facility];  Service: Vascular    SUPERIOR MESTENTERIC ARTERY STENT      WISDOM TOOTH EXTRACTION         Social History     Socioeconomic History    Marital status:      Spouse name: Not on file    Number of children: 0    Years of education: Not on file    Highest education level: Not on file   Occupational History    Occupation: retired   Tobacco Use    Smoking status: Former     Current packs/day: 0.00     Average packs/day: 0.5 packs/day for 10.0 years (5.0 ttl pk-yrs)     Types: Cigarettes     Start date:      Quit date: 2019     Years since quittin.3     Passive exposure: Past    Smokeless tobacco: Never   Vaping Use    Vaping status: Never Used   Substance and Sexual Activity    Alcohol use: Not Currently    Drug use: No    Sexual activity: Not Currently     Comment: defer   Other Topics Concern    Not on file   Social History Narrative    Who lives in your home: Alone     What type of home do you live in: Apartment     Age of your home: 1952 built     How long have you been living there: 5 yrs     Type of heat: forced hot air     Type of fuel: electric     What type of jamin is in your bedroom: carpet jamin     Do you have the following in or near your home:    Air products: Humidifier in the bedroom/kitchen     Pests: none     Pets: none     Are pets allowed in bedroom: N/A     Open fields, wooded areas nearby: No     Basement:  [id number]-no mold     Exposure to second hand smoke: No    Central air     Habits:    Caffeine: Hot tea 1 cup daily- ice tea 1 cup in the afternoon    Chocolate: Occasionally      Social Determinants of Health     Financial Resource Strain: Medium Risk (10/2/2023)    Overall Financial Resource Strain (CARDIA)     Difficulty of Paying Living Expenses: Somewhat hard   Food Insecurity: No Food Insecurity (2/21/2023)    Hunger Vital Sign     Worried About Running Out of Food in the Last Year: Never true     Ran Out of Food in the Last Year: Never true   Transportation Needs: No Transportation Needs (10/2/2023)    PRAPARE - Transportation     Lack of Transportation (Medical): No     Lack of Transportation (Non-Medical): No   Physical Activity: Inactive (5/24/2021)    Exercise Vital Sign     Days of Exercise per Week: 0 days     Minutes of Exercise per Session: 0 min   Stress: Not on file   Social Connections: Moderately Isolated (5/24/2021)    Social Connection and Isolation Panel [NHANES]     Frequency of Communication with Friends and Family: More than three times a week     Frequency of Social Gatherings with Friends and Family: More than three times a week     Attends Christian Services: 1 to 4 times per year     Active Member of Clubs or Organizations: No     Attends Club or Organization Meetings: Never     Marital Status: Never    Intimate Partner Violence: Not At Risk (5/24/2021)    Humiliation, Afraid, Rape, and Kick questionnaire     Fear of Current or Ex-Partner: No     Emotionally Abused: No     Physically Abused: No     Sexually Abused: No   Housing Stability: Low Risk  (7/20/2023)    Housing Stability Vital Sign     Unable to Pay for Housing in the Last Year: No     Number of Places Lived in the Last Year: 1     Unstable Housing in the Last Year: No       Family History   Problem Relation Age of Onset    Lung cancer Mother 60    Brain cancer Mother 60    Diabetes Father     Depression  Father     Other Father         septic    Allergies Sister     Hashimoto's thyroiditis Sister     Abdominal aortic aneurysm Sister     Diabetes Sister     No Known Problems Sister     No Known Problems Maternal Grandmother     No Known Problems Maternal Grandfather     No Known Problems Paternal Grandmother     No Known Problems Paternal Grandfather     Hodgkin's lymphoma Brother     No Known Problems Brother     No Known Problems Maternal Aunt     Diabetes Other     Breast cancer Neg Hx

## 2024-05-27 DIAGNOSIS — M54.16 LUMBAR RADICULOPATHY: ICD-10-CM

## 2024-05-27 DIAGNOSIS — Z90.49 S/P SMALL BOWEL RESECTION: ICD-10-CM

## 2024-05-27 DIAGNOSIS — F41.9 ANXIETY: ICD-10-CM

## 2024-05-28 DIAGNOSIS — R11.0 NAUSEA: ICD-10-CM

## 2024-05-28 RX ORDER — PHENAZOPYRIDINE HYDROCHLORIDE 200 MG/1
200 TABLET, FILM COATED ORAL 2 TIMES DAILY PRN
Qty: 30 TABLET | Refills: 0 | Status: SHIPPED | OUTPATIENT
Start: 2024-05-28

## 2024-05-28 NOTE — TELEPHONE ENCOUNTER
Patient called and stated she wants to take promethazine because zofran is not working for her please review thank you

## 2024-05-28 NOTE — TELEPHONE ENCOUNTER
Pt called to confirm refill request was received as she was not sure since yesterday was a holiday. Advised her request was received and forwarded to provider for approval.

## 2024-05-29 RX ORDER — TRAMADOL HYDROCHLORIDE 50 MG/1
100 TABLET ORAL EVERY 8 HOURS PRN
Qty: 180 TABLET | Refills: 0 | Status: SHIPPED | OUTPATIENT
Start: 2024-05-29

## 2024-05-29 RX ORDER — PROMETHAZINE HYDROCHLORIDE 25 MG/1
25 TABLET ORAL EVERY 6 HOURS PRN
Qty: 60 TABLET | Refills: 5 | OUTPATIENT
Start: 2024-05-29

## 2024-05-30 RX ORDER — PROMETHAZINE HYDROCHLORIDE 25 MG/1
25 TABLET ORAL EVERY 6 HOURS PRN
Qty: 60 TABLET | Refills: 5 | Status: SHIPPED | OUTPATIENT
Start: 2024-05-30

## 2024-06-07 ENCOUNTER — TELEPHONE (OUTPATIENT)
Age: 64
End: 2024-06-07

## 2024-06-07 DIAGNOSIS — N30.10 INTERSTITIAL CYSTITIS: ICD-10-CM

## 2024-06-07 DIAGNOSIS — N39.0 RECURRENT UTI: Primary | ICD-10-CM

## 2024-06-07 NOTE — TELEPHONE ENCOUNTER
Spoke to patient and she has history of chronic interstitial cystitis. She called today with increase urinary frequency and weak urinary urinary stream. No hematuria. She uses one pad daily and the pad count has not increased. She denies any abdominal pain, fevers or chills. She is hydrating well with water. I gave her care advice to rest hydrate and avoid bladder irritants, placed urine orders and reviewed ER precautions. Please advise of any further recommendations.

## 2024-06-07 NOTE — TELEPHONE ENCOUNTER
Pt calling in to request orders for a UA/UC as she is experiencing burning with urination, a weak urinary stream, cramping, low back pain and nausea and believes she has a UTI.     Pt is also requesting a Nephrologist referral from  as it has been mentioned to her before that she should be consulted by one.     Pt call back: [phone]

## 2024-06-13 DIAGNOSIS — F41.9 ANXIETY: ICD-10-CM

## 2024-06-13 RX ORDER — LORAZEPAM 2 MG/1
2 TABLET ORAL EVERY 8 HOURS PRN
Qty: 21 TABLET | Refills: 0 | Status: CANCELLED | OUTPATIENT
Start: 2024-06-13

## 2024-06-13 NOTE — TELEPHONE ENCOUNTER
Pt called asking if PCP can send in a script for the Ativan 2 mg 1 tablet daily. Pt stated she will still be taking the 1 mg in the am and 1 tablet at bedtime. She just needs the 2 mg in the afternoon to get her through the anxiety. Please call the pt when this is done    Reason for call:   [x] Refill   [] Prior Auth  [] Other:     Office:   [x] PCP/Provider -   [] Specialty/Provider -     Medication: LORazepam (ATIVAN) 2 mg tablet Take 1 tablet (2 mg total) by mouth every 8 (eight) hours as needed for anxiety     Pharmacy: [id number] - [place] - [address]      Does the patient have enough for 3 days?   [] Yes   [x] No - Send as HP to POD

## 2024-06-14 ENCOUNTER — TELEPHONE (OUTPATIENT)
Age: 64
End: 2024-06-14

## 2024-06-14 DIAGNOSIS — F41.9 ANXIETY: ICD-10-CM

## 2024-06-14 RX ORDER — LORAZEPAM 2 MG/1
2 TABLET ORAL EVERY 8 HOURS PRN
Qty: 21 TABLET | Refills: 0 | Status: SHIPPED | OUTPATIENT
Start: 2024-06-14 | End: 2024-06-17 | Stop reason: SDUPTHER

## 2024-06-14 NOTE — TELEPHONE ENCOUNTER
Patient called to move her appointment up from 6/21.  She was quite upset on the phone, stating her anxiety is very bad.    She is asking for an increase in her lorazepam, stating Dr. Ayoub had increased her to 2mg once before in April.    Due to symptoms not meeting same day guidelines, I moved her appt up to Monday 6/17, after her scheduled VAS US.    Please advise patient if medication can be increased or if Dr. Ayoub would want to see her today.  Thank you.

## 2024-06-17 ENCOUNTER — HOSPITAL ENCOUNTER (OUTPATIENT)
Dept: NON INVASIVE DIAGNOSTICS | Facility: CLINIC | Age: 64
Discharge: HOME | End: 2024-06-17
Payer: COMMERCIAL

## 2024-06-17 ENCOUNTER — OFFICE VISIT (OUTPATIENT)
Dept: FAMILY MEDICINE CLINIC | Facility: CLINIC | Age: 64
End: 2024-06-17
Payer: COMMERCIAL

## 2024-06-17 VITALS
HEIGHT: 64 IN | SYSTOLIC BLOOD PRESSURE: 120 MMHG | DIASTOLIC BLOOD PRESSURE: 54 MMHG | TEMPERATURE: 97.8 F | WEIGHT: 188 LBS | OXYGEN SATURATION: 94 % | BODY MASS INDEX: 32.1 KG/M2 | HEART RATE: 62 BPM

## 2024-06-17 DIAGNOSIS — F41.9 ANXIETY: ICD-10-CM

## 2024-06-17 DIAGNOSIS — K55.069 MESENTERIC ARTERY THROMBOSIS (HCC): ICD-10-CM

## 2024-06-17 DIAGNOSIS — G43.009 MIGRAINE WITHOUT AURA AND WITHOUT STATUS MIGRAINOSUS, NOT INTRACTABLE: Primary | ICD-10-CM

## 2024-06-17 DIAGNOSIS — I10 ESSENTIAL HYPERTENSION: ICD-10-CM

## 2024-06-17 DIAGNOSIS — K43.9 VENTRAL HERNIA WITHOUT OBSTRUCTION OR GANGRENE: ICD-10-CM

## 2024-06-17 DIAGNOSIS — I65.22 ASYMPTOMATIC STENOSIS OF LEFT CAROTID ARTERY: ICD-10-CM

## 2024-06-17 DIAGNOSIS — N18.30 STAGE 3 CHRONIC KIDNEY DISEASE, UNSPECIFIED WHETHER STAGE 3A OR 3B CKD (HCC): ICD-10-CM

## 2024-06-17 DIAGNOSIS — I65.22 STENOSIS OF LEFT CAROTID ARTERY: ICD-10-CM

## 2024-06-17 PROCEDURE — 99214 OFFICE O/P EST MOD 30 MIN: CPT | Performed by: FAMILY MEDICINE

## 2024-06-17 PROCEDURE — G2211 COMPLEX E/M VISIT ADD ON: HCPCS | Performed by: FAMILY MEDICINE

## 2024-06-17 PROCEDURE — 93880 EXTRACRANIAL BILAT STUDY: CPT

## 2024-06-17 RX ORDER — LORAZEPAM 2 MG/1
2 TABLET ORAL EVERY 8 HOURS PRN
Qty: 90 TABLET | Refills: 2 | Status: SHIPPED | OUTPATIENT
Start: 2024-06-17

## 2024-06-17 NOTE — PROGRESS NOTES
Assessment/Plan: Labs and records reviewed.  Patient will increase lorazepam to 2 mg 3 times a day routinely for anxiety which is noncontrolled at this time.  Hypertension stable at this time.  Migraines stable.  Chronic kidney disease stage III stable.  Follow-up 3 months.       Diagnoses and all orders for this visit:    Migraine without aura and without status migrainosus, not intractable    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HCC)    Essential hypertension    Stage 3 chronic kidney disease, unspecified whether stage 3a or 3b CKD (HCC)    Ventral hernia without obstruction or gangrene    Anxiety  -     LORazepam (ATIVAN) 2 mg tablet; Take 1 tablet (2 mg total) by mouth every 8 (eight) hours as needed for anxiety            Subjective:        Patient ID: Nancy Jones is a 64 y.o. female.      Patient with some flareups of abdominal pain and interstitial cystitis.  Patient does get pain in the lower abdomen and back.  Patient with worsening anxiety.  Patient with some shortness of breath also.  Patient with worsening anxiety overall.  Patient with left epistaxis and postnasal drip.  Patient with some hoarseness.          The following portions of the patient's history were reviewed and updated as appropriate: allergies, current medications, past family history, past medical history, past social history, past surgical history and problem list.      Review of Systems   Constitutional: Negative.  Negative for fever.   HENT:  Positive for nosebleeds, postnasal drip and voice change.    Eyes: Negative.    Respiratory: Negative.     Cardiovascular: Negative.    Gastrointestinal:  Positive for abdominal pain.   Endocrine: Negative.    Genitourinary:  Positive for frequency and pelvic pain.   Musculoskeletal:  Positive for arthralgias and back pain.   Skin: Negative.    Allergic/Immunologic: Negative.    Neurological: Negative.    Hematological: Negative.    Psychiatric/Behavioral:  The patient is nervous/anxious.       "      Objective:        Depression Screening and Follow-up Plan: Patient was screened for depression during today's encounter. They screened negative with a PHQ-2 score of 0.            /54 (BP Location: Right arm, Patient Position: Sitting, Cuff Size: Standard)   Pulse 62   Temp 97.8 °F (36.6 °C) (Temporal)   Ht 5' 4\" (1.626 m)   Wt 85.3 kg (188 lb)   LMP  (LMP Unknown)   SpO2 94%   BMI 32.27 kg/m²          Physical Exam  Vitals and nursing note reviewed.   Constitutional:       General: She is not in acute distress.     Appearance: Normal appearance. She is not ill-appearing, toxic-appearing or diaphoretic.   HENT:      Head: Normocephalic and atraumatic.      Right Ear: Tympanic membrane, ear canal and external ear normal. There is no impacted cerumen.      Left Ear: Tympanic membrane, ear canal and external ear normal. There is no impacted cerumen.      Nose: Nose normal. No congestion or rhinorrhea.      Mouth/Throat:      Mouth: Mucous membranes are moist.      Pharynx: No oropharyngeal exudate or posterior oropharyngeal erythema.   Eyes:      General: No scleral icterus.        Right eye: No discharge.         Left eye: No discharge.   Neck:      Vascular: No carotid bruit.   Cardiovascular:      Rate and Rhythm: Normal rate and regular rhythm.      Pulses: Normal pulses.      Heart sounds: Normal heart sounds. No murmur heard.     No friction rub. No gallop.   Pulmonary:      Effort: Pulmonary effort is normal. No respiratory distress.      Breath sounds: Normal breath sounds. No stridor. No wheezing, rhonchi or rales.   Chest:      Chest wall: No tenderness.   Musculoskeletal:         General: No swelling, deformity or signs of injury.      Cervical back: Normal range of motion and neck supple. No rigidity. No muscular tenderness.      Right lower leg: No edema.      Left lower leg: No edema.   Lymphadenopathy:      Cervical: No cervical adenopathy.   Skin:     General: Skin is warm and dry.    "   Capillary Refill: Capillary refill takes less than 2 seconds.      Coloration: Skin is not jaundiced.      Findings: No bruising, erythema, lesion or rash.   Neurological:      Mental Status: She is alert and oriented to person, place, and time. Mental status is at baseline.      Cranial Nerves: No cranial nerve deficit.      Sensory: No sensory deficit.      Motor: No weakness.      Coordination: Coordination normal.      Gait: Gait normal.   Psychiatric:         Behavior: Behavior normal.         Thought Content: Thought content normal.         Judgment: Judgment normal.      Comments: Anxious

## 2024-06-18 PROCEDURE — 93880 EXTRACRANIAL BILAT STUDY: CPT | Performed by: SURGERY

## 2024-06-19 DIAGNOSIS — I10 ESSENTIAL HYPERTENSION: ICD-10-CM

## 2024-06-19 DIAGNOSIS — I10 BENIGN ESSENTIAL HYPERTENSION: ICD-10-CM

## 2024-06-19 RX ORDER — FUROSEMIDE 40 MG/1
40 TABLET ORAL DAILY
Qty: 90 TABLET | Refills: 0 | Status: SHIPPED | OUTPATIENT
Start: 2024-06-19

## 2024-06-20 DIAGNOSIS — N30.10 INTERSTITIAL CYSTITIS: ICD-10-CM

## 2024-06-21 ENCOUNTER — NURSE TRIAGE (OUTPATIENT)
Age: 64
End: 2024-06-21

## 2024-06-21 ENCOUNTER — TELEPHONE (OUTPATIENT)
Age: 64
End: 2024-06-21

## 2024-06-21 ENCOUNTER — PATIENT OUTREACH (OUTPATIENT)
Dept: FAMILY MEDICINE CLINIC | Facility: CLINIC | Age: 64
End: 2024-06-21

## 2024-06-21 DIAGNOSIS — K21.9 GASTROESOPHAGEAL REFLUX DISEASE WITHOUT ESOPHAGITIS: ICD-10-CM

## 2024-06-21 DIAGNOSIS — K21.9 GASTROESOPHAGEAL REFLUX DISEASE WITHOUT ESOPHAGITIS: Primary | ICD-10-CM

## 2024-06-21 DIAGNOSIS — M54.16 LUMBAR RADICULOPATHY: ICD-10-CM

## 2024-06-21 RX ORDER — PHENAZOPYRIDINE HYDROCHLORIDE 200 MG/1
200 TABLET, FILM COATED ORAL 2 TIMES DAILY PRN
Qty: 30 TABLET | Refills: 5 | Status: SHIPPED | OUTPATIENT
Start: 2024-06-21

## 2024-06-21 RX ORDER — METHOCARBAMOL 500 MG/1
1000 TABLET, FILM COATED ORAL 3 TIMES DAILY
Qty: 180 TABLET | Refills: 5 | Status: SHIPPED | OUTPATIENT
Start: 2024-06-21

## 2024-06-21 RX ORDER — OMEPRAZOLE 40 MG/1
40 CAPSULE, DELAYED RELEASE ORAL
Qty: 60 CAPSULE | Refills: 5 | Status: SHIPPED | OUTPATIENT
Start: 2024-06-21

## 2024-06-21 RX ORDER — PANTOPRAZOLE SODIUM 40 MG/1
40 TABLET, DELAYED RELEASE ORAL
Qty: 60 TABLET | Refills: 4 | Status: SHIPPED | OUTPATIENT
Start: 2024-06-21 | End: 2024-06-21 | Stop reason: ALTCHOICE

## 2024-06-21 NOTE — TELEPHONE ENCOUNTER
Spoke with patient regarding ankle, wrist swelling; +3lbs weight gain since June 16th; mild shortness of breath with exertion.    Weights: June 21st   190lbs                 June 20th   190lbs                 June 17th   188lbs                 June 16th   187lbs    Takes Lasix 40mg daily.  Ankle swelling, socks leave minimal indents; wrist swelling, bracelets leave minimal indents.    States feels bloated, has large abdominal hernia that is pressing on diaphragm which may be the cause of shortness of breath with exertion.    Inquiring if she should take extra Lasix. Has hospital follow up appointment July 9th, no sooner appointments; does not want ED evaluation at this time.    Please advise.

## 2024-06-21 NOTE — TELEPHONE ENCOUNTER
Pt of Dr. Recinos calling in -  Consult for inscisional hernias was in Jan 2024.   Pt states she had corotid sx 4/29 and is going to her appt to get cleared by vascular for hernia sx on 7/12.     Pt is requesting appointment with Dr. Recinos to go over hernia surgery discussion/options and get scheduled for surgery.   Made appointment for soonest avail Aug 12.   Pt put on wait list per their request.

## 2024-06-21 NOTE — TELEPHONE ENCOUNTER
Placed call to patient. She verbalized understanding. No further questions or concerns at this time.

## 2024-06-21 NOTE — TELEPHONE ENCOUNTER
Patient called the RX Refill Line. Message is being forwarded to the office.     Patient is requesting a call back.  She takes Methocarbamol 1 po TID.  She wants to know if she can increase it to 2 at a time to help control her pain better      Please contact patient at  [phone]

## 2024-06-21 NOTE — TELEPHONE ENCOUNTER
SPOKE TO PATIENT.  SHE WILL INCREASE TO 2 PILLS TID PRN.  NEW RX SENT TO DR MONTANEZ FOR APPROVAL.

## 2024-06-21 NOTE — TELEPHONE ENCOUNTER
Please advise     Velma from [facility] pharmacy calling in, they are refilling omeprazole but they were flagged for drug to drug interaction with plavix. It can reduce the effectiveness of plavix and increase change of blood clots.     Please advise if you would like to switch PPI?   Reason for Disposition  • Information only question and nurse able to answer    Protocols used: Information Only Call - No Triage-ADULT-[place]

## 2024-06-21 NOTE — TELEPHONE ENCOUNTER
Left message for patient with change in medication recommendations.  Asked her to call the office with any questions or concerns.

## 2024-06-21 NOTE — PROGRESS NOTES
Contacted patient for f/u.  She relays she was having L epistaxis and postnasal drip and stopped plavix.  She is taking baby asa daily.  She is also having flare ups of interstitial cystitis, taking pyridium.  She continues with abdominal pain related to hernia and takes tramadol as needed.  She is eager to have her hernia repair surgery.  Weight today was up by 3 lbs.  She denies chest pain but does feel more dyspnea on exertion.  She reports her ankles are swollen.  Instructed her to contact cardiology to report her symptoms.  She was agreeable.  Patient has lots of anxiety related to her multiple medical conditions.  She takes ativan three times daily and dose was increased at last PCP appointment.  She reports a diminished appetite, related to hernia.  Reinforced low sodium diet and fluid restrictions.  She is taking all medications as prescribed.  Follow up appointments scheduled.  She denies any needs at home.  Agreeable to continued outreach.

## 2024-06-21 NOTE — TELEPHONE ENCOUNTER
"Reason for Disposition  • MILD difficulty breathing (e.g., minimal/no SOB at rest, SOB with walking, pulse < 100) of new-onset or worse than normal    Answer Assessment - Initial Assessment Questions  1. LOCATION: \"Which ankle is swollen?\" \"Where is the swelling?\"      Both, socks leave indents  2. ONSET: \"When did the swelling start?\"      Over the last week  3. SIZE: \"How large is the swelling?\"      Enough to leave sock marks  4. PAIN: \"Is there any pain?\" If Yes, ask: \"How bad is it?\" (Scale 1-10; or mild, moderate, severe)    - NONE (0): no pain.    - MILD (1-3): doesn't interfere with normal activities.     - MODERATE (4-7): interferes with normal activities (e.g., work or school) or awakens from sleep, limping.     - SEVERE (8-10): excruciating pain, unable to do any normal activities, unable to walk.       denies  5. CAUSE: \"What do you think caused the ankle swelling?\"      unsure  6. OTHER SYMPTOMS: \"Do you have any other symptoms?\" (e.g., fever, chest pain, difficulty breathing, calf pain)      Shortness of breath with activity    Answer Assessment - Initial Assessment Questions  1. ONSET: \"When did the swelling start?\" (e.g., minutes, hours, days, weeks)      Over the last week  2. LOCATION: \"What part of the wrist is swollen?\"  \"Are both wrists swollen or just one wrist?\"      Both wrists, bracelets leave indents  3. SEVERITY: \"How bad is the swelling?\"     - BALL OR LUMP: small ball or lump    - SKIN ONLY: localized; puffy or swollen area or patch of skin    - MILD JOINT SWELLING: joint feels or looks mildly swollen or puffy    - MODERATE JOINT SWELLING: moderate joint swelling; looks swollen    - SEVERE JOINT SWELLING:  severe joint swelling; can barely bend or move joint      Bracelets leave indents  4. RECURRENT SYMPTOM: \"Have you had wrist swelling before?\" If Yes, ask: \"When was the last time?\" \"What happened that time?\"      denies  5. CAUSE: \"What do you think is causing the wrist swelling?\" " "(e.g., arthritis, ganglion cyst, insect bite, recent injury)      unsure  6. OTHER SYMPTOMS: \"Do you have any other symptoms?\" (e.g., fever, hand pain)      Ankle swelling, shortness of breath with exertion    Answer Assessment - Initial Assessment Questions  1. RESPIRATORY STATUS: \"Describe your breathing?\" (e.g., wheezing, shortness of breath, unable to speak, severe coughing)       Short of breath with exertion only  2. ONSET: \"When did this breathing problem begin?\"       Over the last few weeks  3. PATTERN \"Does the difficult breathing come and go, or has it been constant since it started?\"       With exertion  4. SEVERITY: \"How bad is your breathing?\" (e.g., mild, moderate, severe)     - MILD: No SOB at rest, mild SOB with walking, speaks normally in sentences, can lay down, no retractions, pulse < 100.     - MODERATE: SOB at rest, SOB with minimal exertion and prefers to sit, cannot lie down flat, speaks in phrases, mild retractions, audible wheezing, pulse 100-120.     - SEVERE: Very SOB at rest, speaks in single words, struggling to breathe, sitting hunched forward, retractions, pulse > 120       mild  5. RECURRENT SYMPTOM: \"Have you had difficulty breathing before?\" If Yes, ask: \"When was the last time?\" and \"What happened that time?\"       denies  6. CARDIAC HISTORY: \"Do you have any history of heart disease?\" (e.g., heart attack, angina, bypass surgery, angioplasty)       Afib, HTN, CHF  7. LUNG HISTORY: \"Do you have any history of lung disease?\"  (e.g., pulmonary embolus, asthma, emphysema)      [place]  8. CAUSE: \"What do you think is causing the breathing problem?\"       Plavix? Stopped on June 19th  9. OTHER SYMPTOMS: \"Do you have any other symptoms? (e.g., dizziness, runny nose, cough, chest pain, fever)      Ankle/wrist swelling, feels bloated (has abdominal hernia pressing up on diaphragm)  11. TRAVEL: \"Have you traveled out of the country in the last month?\" (e.g., travel history, exposures)        " denies    Protocols used: Ankle Swelling-ADULT-AH, Wrist Swelling-ADULT-[facility], Breathing Difficulty-ADULT-[place]

## 2024-06-24 ENCOUNTER — TRANSCRIBE ORDERS (OUTPATIENT)
Dept: GASTROENTEROLOGY | Facility: CLINIC | Age: 64
End: 2024-06-24

## 2024-06-24 DIAGNOSIS — I48.0 PAROXYSMAL ATRIAL FIBRILLATION (HCC): ICD-10-CM

## 2024-06-24 NOTE — TELEPHONE ENCOUNTER
Called and spoke to pt, let her know that Magy Oakley is out of the office today and will be back tomorrow and we will reach out when we know more. Informed pt that our on call provider recommends being evaluated at the ER if she has abdominal pain, nausea and shortness of breath to rule out a cardiac issue. Pt understood and thanked us.

## 2024-06-24 NOTE — TELEPHONE ENCOUNTER
Spoke  to pt letting her know omeprazole was order she can p/up @ her pharmacy ,while on the phone she was telling me last night she had nausea,SOB and abd pain/pain level  is 5/10 .Today is mild she is interested getting a study to r/o her hiatal hernia would like to speak to Darius

## 2024-06-25 ENCOUNTER — TELEPHONE (OUTPATIENT)
Age: 64
End: 2024-06-25

## 2024-06-25 RX ORDER — APIXABAN 5 MG/1
TABLET, FILM COATED ORAL
Qty: 180 TABLET | Refills: 1 | Status: SHIPPED | OUTPATIENT
Start: 2024-06-25

## 2024-06-25 NOTE — TELEPHONE ENCOUNTER
Called pharmacy patient has been on both meds for several years... pharmacy states th Eliquis was new for their pharmacy will dispense.    Patient submitted requested questionnaire.  Emerald Franco MA

## 2024-06-25 NOTE — TELEPHONE ENCOUNTER
[facility] called regarding new Rx for omeprazole. Pt on Plavix; flagged for interaction as it decreases effectiveness of Plavix. Advised per encounter pt, not taking Plavix. No further concerns.    [facility] [phone]

## 2024-06-25 NOTE — TELEPHONE ENCOUNTER
Spoke with patient regarding weights.    Patient did not weigh herself over the weekend, did not feel well.    Monday June 24th:  190lbs, took Lasix 40mg AM & PM.    Tuesday June 25th:  187lbs, Lasix 40mg AM.     States fluid level looks better, bracelets are looser; breathing the same due to hernia pressing up.     Please advise on Lasix dose.

## 2024-06-25 NOTE — TELEPHONE ENCOUNTER
Pt called upset stated she received a message in my chart stating she needs to reschedule appt with Crystal? She was transferred to me by a PEP, but I do not see any documentation confirming this.   Pt stated this is the second time this has happened and does not want to reschedule because she needs CC for upcoming hernia surgery. Please call to reschedule

## 2024-06-25 NOTE — TELEPHONE ENCOUNTER
Latia called from [facility] pharmacy, they received a script on Eliquis and wanted to make sure you are aware pt is currently taking Diltiazem and this can increase the concentration and effects of the eliquis.     Please advise if pt is to continue both medication

## 2024-06-27 DIAGNOSIS — M54.16 LUMBAR RADICULOPATHY: ICD-10-CM

## 2024-06-27 DIAGNOSIS — F41.9 ANXIETY: ICD-10-CM

## 2024-06-27 DIAGNOSIS — Z90.49 S/P SMALL BOWEL RESECTION: ICD-10-CM

## 2024-06-27 RX ORDER — TRAMADOL HYDROCHLORIDE 50 MG/1
100 TABLET ORAL EVERY 8 HOURS PRN
Qty: 180 TABLET | Refills: 0 | Status: SHIPPED | OUTPATIENT
Start: 2024-06-27

## 2024-06-30 ENCOUNTER — LAB (OUTPATIENT)
Dept: LAB | Facility: HOSPITAL | Age: 64
End: 2024-06-30
Payer: COMMERCIAL

## 2024-06-30 DIAGNOSIS — N30.10 INTERSTITIAL CYSTITIS: ICD-10-CM

## 2024-06-30 DIAGNOSIS — N39.0 RECURRENT UTI: ICD-10-CM

## 2024-06-30 LAB
BACTERIA UR QL AUTO: ABNORMAL /HPF
BILIRUB UR QL STRIP: NEGATIVE
CLARITY UR: ABNORMAL
COLOR UR: ABNORMAL
GLUCOSE UR STRIP-MCNC: NEGATIVE MG/DL
HGB UR QL STRIP.AUTO: ABNORMAL
HYALINE CASTS #/AREA URNS LPF: ABNORMAL /LPF
KETONES UR STRIP-MCNC: NEGATIVE MG/DL
LEUKOCYTE ESTERASE UR QL STRIP: ABNORMAL
NITRITE UR QL STRIP: NEGATIVE
NON-SQ EPI CELLS URNS QL MICRO: ABNORMAL /HPF
PH UR STRIP.AUTO: 5.5 [PH]
PROT UR STRIP-MCNC: ABNORMAL MG/DL
RBC #/AREA URNS AUTO: ABNORMAL /HPF
SP GR UR STRIP.AUTO: 1.01 (ref 1–1.03)
UROBILINOGEN UR STRIP-ACNC: <2 MG/DL
WBC #/AREA URNS AUTO: ABNORMAL /HPF
WBC CLUMPS # UR AUTO: PRESENT /UL

## 2024-06-30 PROCEDURE — 81001 URINALYSIS AUTO W/SCOPE: CPT

## 2024-06-30 PROCEDURE — 87086 URINE CULTURE/COLONY COUNT: CPT

## 2024-06-30 PROCEDURE — 87186 SC STD MICRODIL/AGAR DIL: CPT

## 2024-06-30 PROCEDURE — 87077 CULTURE AEROBIC IDENTIFY: CPT

## 2024-07-02 LAB
BACTERIA UR CULT: ABNORMAL
BACTERIA UR CULT: ABNORMAL

## 2024-07-08 ENCOUNTER — TELEPHONE (OUTPATIENT)
Age: 64
End: 2024-07-08

## 2024-07-08 NOTE — TELEPHONE ENCOUNTER
Placed call to patient. She verbalized understanding. No further questions or concerns at this time. She will call on Monday with an update.

## 2024-07-08 NOTE — TELEPHONE ENCOUNTER
Received call from pt stating she has noticed herself gaining weight since the end of June/early July. Please see log below. Pt also stated she has been having b/l ankle swelling that travels up a little past her ankles and b/l hand swelling. Denies chest pain nor sob. She is currently taking Lasix 40mg daily. Please review and advise.      6/26: ?  6/27: ?  6/28: 192lbs  6/29: 193lbs  6/30: 192lbs  7/1: 191lbs  7/2: 194lbs  7/3: 191lbs  7/4: ?  7/5: 192lbs  7/6: 192lbs  7/7: 192lbs  7/8: 194lbs.

## 2024-07-08 NOTE — TELEPHONE ENCOUNTER
Ambika, when reporting Lasix dosing, please also remember to report any potassium supplements the patient may be on, as adjusting Lasix usually involves adjusting potassium as well.    Clinical team, please have her take an additional half a tablet of Lasix today, Wednesday, and Friday of this week.  Next week, she can return to usual 40 mg daily dosing.  Encouraged increase dietary potassium on days when she takes additional half a tablet of Lasix.  Weight gain seems to be occurring slow but in light of slight bilateral ankle swelling, additional Lasix may help.  Please encourage low-sodium diet, thank you

## 2024-07-10 ENCOUNTER — TELEPHONE (OUTPATIENT)
Dept: UROLOGY | Facility: CLINIC | Age: 64
End: 2024-07-10

## 2024-07-10 NOTE — TELEPHONE ENCOUNTER
Contacted Adina and made her aware that unfortunately her cystoscopy with Dr. Hobson tomorrow needs to be re-scheduled due to a water issue in the [place] office. Patient re-scheduled to 7/30/24 @ 10:00 am.

## 2024-07-12 ENCOUNTER — OFFICE VISIT (OUTPATIENT)
Dept: VASCULAR SURGERY | Facility: CLINIC | Age: 64
End: 2024-07-12

## 2024-07-12 VITALS
WEIGHT: 193 LBS | SYSTOLIC BLOOD PRESSURE: 140 MMHG | DIASTOLIC BLOOD PRESSURE: 90 MMHG | OXYGEN SATURATION: 96 % | HEIGHT: 64 IN | BODY MASS INDEX: 32.95 KG/M2 | HEART RATE: 74 BPM

## 2024-07-12 DIAGNOSIS — I65.22 STENOSIS OF LEFT CAROTID ARTERY: ICD-10-CM

## 2024-07-12 DIAGNOSIS — I65.22 CAROTID STENOSIS, ASYMPTOMATIC, LEFT: Primary | ICD-10-CM

## 2024-07-12 PROCEDURE — 99024 POSTOP FOLLOW-UP VISIT: CPT | Performed by: SURGERY

## 2024-07-12 RX ORDER — ASPIRIN 81 MG/1
81 TABLET ORAL DAILY
COMMUNITY

## 2024-07-12 NOTE — PROGRESS NOTES
"Ambulatory Visit  Name: Nancy Jones      : 1960      MRN: [id number]  Encounter Provider: Roberto García DO  Encounter Date: 2024   Encounter department: [facility] [place]    Assessment & Plan   1. Carotid stenosis, asymptomatic, left  -     VAS carotid complete study; Future; Expected date: 2025  2. Stenosis of left carotid artery  Assessment & Plan:  Adina presents to the office to review surveillance carotid duplex.  She underwent a left TCAR on 2024.    She denies any focal neurologic deficits.  She does have admitted baseline anxiety and has noticed difficulty \"swallowing\" when she is anxious.    Her surveillance carotid duplex demonstrates a widely patent stent.  There is no evidence of significant contralateral stenosis.  Continue Plavix.  Continue surveillance with 6-month duplex.  Of note patient does mention she has an incisional hernia from prior abdominal surgery.  From a vascular standpoint there is no contraindications to proceeding with hernia repair.  If needed she may stop her Plavix x 7 days.      History of Present Illness     Patient is s/p Left TCAR done 24. She presents for routine follow up and review CV done 24. She denies TIA/CVA symptoms. She reports nausea, headaches, and anxiety every day. She also states it's hard to swallow on left side. She is taking ASA 81 mg and Eliquis. She is a former smoker.       Nancy Jones is a 64 y.o. female who presents to the office for routine scheduled visit to review surveillance carotid duplex following a left TCAR.    Review of Systems   Constitutional: Negative.    HENT: Negative.     Eyes:  Negative for visual disturbance.   Respiratory: Negative.     Cardiovascular: Negative.    Gastrointestinal:  Positive for nausea.   Endocrine: Negative.    Genitourinary: Negative.    Musculoskeletal: Negative.    Skin: Negative.    Allergic/Immunologic: Negative.    Neurological:  Positive for headaches. " "Negative for dizziness, facial asymmetry, speech difficulty, weakness and light-headedness.   Hematological: Negative.    Psychiatric/Behavioral:  The patient is nervous/anxious.      Past Medical History   Past Medical History:   Diagnosis Date    A-fib ([facility]) 03/2020    Allergic     Anxiety     Arthritis     Asthma     Back pain     and muscle pain    Bruises easily     Chronic pain disorder     Interstitial pain    Colon polyp     Dental bridge present     Depression     Eczema     GERD (gastroesophageal reflux disease)     Heart murmur     History of blood clots     per pt \"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\"    History of pneumonia     Hives     Hyperlipidemia     Hypertension     Infertility, female     Interstitial cystitis     Migraine     sees neurologist    Motion sickness     Obesity     Palpitations     PONV (postoperative nausea and vomiting)     Premature atrial contractions 11/09/2022    Psychiatric disorder     TIA (transient ischemic attack) 09/2022    per pt --\"had MRI and 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\"    Urinary incontinence     wears Pads    Venous insufficiency 08/01/2017    Wears glasses      Past Surgical History:   Procedure Laterality Date    COLONOSCOPY      DILATION AND CURETTAGE OF UTERUS      EGD      EXPLORATORY LAPAROTOMY      for infertility    EXPLORATORY LAPAROTOMY W/ BOWEL RESECTION N/A 7/19/2023    Procedure: LAPAROTOMY EXPLORATORY W/ BOWEL RESECTION;  Surgeon: Crista Recinos MD;  [facility];  Service: General    HAND SURGERY      Hand excision of tendon cyst    HI LAPAROSCOPIC APPENDECTOMY N/A 05/03/2022    Procedure: APPENDECTOMY LAPAROSCOPIC;  Surgeon: Vin Fields MD;  [facility];  Service: General    HI LAPS ABD PRTM&OMENTUM DX W/WO SPEC BR/WA SPX N/A 7/19/2023    Procedure: LAPAROSCOPY DIAGNOSTIC, LYSIS OF ADHESIONS, LAPAROSCOPY TAKE TAKEDOWN OF LEFT COLON, EXPLORATORY LAPAROSCOPY, LYSIS OF ADHESIONS, RESECTION OF " TRANSVERSE COLON SPLENIC FLEXURE AND DESCENDING COLON , TAKE DOWN OF SPLENIC FLEXURE, SIGMOIDOSCOPY, MOBILIZATION OF RIGHT COLON, PRIMARY ANASTOMOSIS EEA 29, TAP BLOCK;  Surgeon: Crista Recinos MD;  [facility];  Service: General    VT TCAT IV STENT CRV CRTD ART EMBOLIC PROTECJ Left 4/29/2024    Procedure: ANGIOPLASTY ARTERY CAROTID W/ STENT;  Surgeon: Roberto García DO;  [facility];  Service: Vascular    VT TEAEC W/PATCH GRF CAROTID VERTB SUBCLAV NECK INC Left 1/31/2023    Procedure: EXPLORATION OF LEFT CAROTID ARTERY; ABORTED ENDARTERECTOMY ARTERY CAROTID;  Surgeon: Roberto García DO;  Location: [facility];  Service: Vascular    SUPERIOR MESTENTERIC ARTERY STENT      WISDOM TOOTH EXTRACTION       Family History   Problem Relation Age of Onset    Lung cancer Mother 60    Brain cancer Mother 60    Diabetes Father     Depression Father     Other Father         septic    Allergies Sister     Hashimoto's thyroiditis Sister     Abdominal aortic aneurysm Sister     Diabetes Sister     No Known Problems Sister     No Known Problems Maternal Grandmother     No Known Problems Maternal Grandfather     No Known Problems Paternal Grandmother     No Known Problems Paternal Grandfather     Hodgkin's lymphoma Brother     No Known Problems Brother     No Known Problems Maternal Aunt     Diabetes Other     Breast cancer Neg Hx      Current Outpatient Medications on File Prior to Visit   Medication Sig Dispense Refill    acetaminophen (TYLENOL) 325 mg tablet Take 2 tablets (650 mg total) by mouth every 6 (six) hours  0    amiodarone 100 mg tablet Take 1 tablet (100 mg total) by mouth daily 90 tablet 3    apixaban (Eliquis) 5 mg TAKE ONE TABLET BY MOUTH TWICE DAILY 180 tablet 1    aspirin (ECOTRIN LOW STRENGTH) 81 mg EC tablet Take 81 mg by mouth daily      chlordiazepoxide-clidinium (LIBRAX) 5-2.5 mg per capsule Take 1 capsule by mouth daily as needed for cramping 90 capsule 0    Diclofenac Sodium  (VOLTAREN) 1 % Apply 2 g topically 4 (four) times a day 100 g 2    diltiazem (CARDIZEM CD) 120 mg 24 hr capsule TAKE ONE CAPSULE BY MOUTH ONCE DAILY 90 capsule 1    Docusate Sodium (COLACE PO) Take by mouth daily at bedtime      Evolocumab (Repatha SureClick) 140 MG/ML SOAJ Inject 1 mL (140 mg total) under the skin every 14 (fourteen) days 2 mL 11    fexofenadine (ALLEGRA) 180 MG tablet Take 180 mg by mouth daily      fluticasone-vilanterol (BREO ELLIPTA) 200-25 MCG/INH inhaler Inhale 1 puff daily Rinse mouth after use. 3 Inhaler 3    furosemide (LASIX) 40 mg tablet Take 1 tablet (40 mg total) by mouth daily 90 tablet 0    LORazepam (ATIVAN) 2 mg tablet Take 1 tablet (2 mg total) by mouth every 8 (eight) hours as needed for anxiety 90 tablet 2    methocarbamol (ROBAXIN) 500 mg tablet Take 2 tablets (1,000 mg total) by mouth 3 (three) times a day 180 tablet 5    metoprolol succinate (TOPROL-XL) 100 mg 24 hr tablet Take 1 tablet (100 mg total) by mouth 2 (two) times a day 180 tablet 1    omeprazole (PriLOSEC) 40 MG capsule Take 1 capsule (40 mg total) by mouth 2 (two) times a day before meals 60 capsule 5    ondansetron (ZOFRAN-ODT) 8 mg disintegrating tablet Take 1 tablet (8 mg total) by mouth every 12 (twelve) hours as needed for nausea or vomiting 30 tablet 2    phenazopyridine (PYRIDIUM) 200 mg tablet Take 1 tablet (200 mg total) by mouth 2 (two) times a day as needed for bladder spasms 30 tablet 5    polyethylene glycol (MIRALAX) 17 g packet Take 17 g by mouth daily as needed (constipation)      promethazine (PHENERGAN) 25 mg tablet Take 1 tablet (25 mg total) by mouth every 6 (six) hours as needed for nausea or vomiting 60 tablet 5    senna (SENOKOT) 8.6 mg Take 1 tablet (8.6 mg total) by mouth daily as needed for constipation      spironolactone (ALDACTONE) 25 mg tablet TAKE ONE TABLET BY MOUTH TWICE DAILY 180 tablet 1    traMADol (ULTRAM) 50 mg tablet Take 2 tablets (100 mg total) by mouth every 8 (eight)  "hours as needed for moderate pain 180 tablet 0    triamcinolone (KENALOG) 0.1 % cream Apply topically 2 (two) times a day 15 g 2    clopidogrel (Plavix) 75 mg tablet Take 1 tablet (75 mg total) by mouth daily (Patient not taking: Reported on 7/12/2024) 30 tablet 3    LORazepam (ATIVAN) 1 mg tablet Take 1 tablet (1 mg total) by mouth 3 (three) times a day (Patient not taking: Reported on 7/12/2024) 90 tablet 0    magnesium Oxide (MAG-OX) 400 mg TABS Take 1 tablet (400 mg total) by mouth daily at bedtime (Patient not taking: Reported on 4/10/2024) 90 tablet 3     Current Facility-Administered Medications on File Prior to Visit   Medication Dose Route Frequency Provider Last Rate Last Admin    cyanocobalamin injection 1,000 mcg  1,000 mcg Intramuscular Q30 Days Coy Ayoub, DO   1,000 mcg at 11/16/23 1151     Allergies   Allergen Reactions    Ace Inhibitors Angioedema and Anaphylaxis     Anaphylaxis    Benicar [Olmesartan] Angioedema     See Allergy note from 9/11/2008.  Swollen ankles/legs    Hydralazine Other (See Comments)     Lip swelling  Flank pain    Other Anaphylaxis and Other (See Comments)     Preservatives- itching throat closes, hi  E Z Cat scan contrast - hives throat closes itching,  Preservatives- itching throat closes, hi  Artificial sweeteners    Valsartan Angioedema     Lips, face swollen    Ampicillin Hives     Depends on brand some preservatives can react. Has recently tolerated oral amoxicillin.    Sulfa Antibiotics Hives     stuffiness,itching,hives,throat closing--per pt \"sometimes able to take depending on the brand and the filler or possibly preservatives in it\"    Motrin [Ibuprofen] Other (See Comments)     Per pt \" told not to take due to A Fib\"    Wound Dressing Adhesive Other (See Comments)     Per pt Adhesive on EKG leads caused redness      Current Outpatient Medications on File Prior to Visit   Medication Sig Dispense Refill    acetaminophen (TYLENOL) 325 mg tablet Take 2 tablets (650 " mg total) by mouth every 6 (six) hours  0    amiodarone 100 mg tablet Take 1 tablet (100 mg total) by mouth daily 90 tablet 3    apixaban (Eliquis) 5 mg TAKE ONE TABLET BY MOUTH TWICE DAILY 180 tablet 1    aspirin (ECOTRIN LOW STRENGTH) 81 mg EC tablet Take 81 mg by mouth daily      chlordiazepoxide-clidinium (LIBRAX) 5-2.5 mg per capsule Take 1 capsule by mouth daily as needed for cramping 90 capsule 0    Diclofenac Sodium (VOLTAREN) 1 % Apply 2 g topically 4 (four) times a day 100 g 2    diltiazem (CARDIZEM CD) 120 mg 24 hr capsule TAKE ONE CAPSULE BY MOUTH ONCE DAILY 90 capsule 1    Docusate Sodium (COLACE PO) Take by mouth daily at bedtime      Evolocumab (Repatha SureClick) 140 MG/ML SOAJ Inject 1 mL (140 mg total) under the skin every 14 (fourteen) days 2 mL 11    fexofenadine (ALLEGRA) 180 MG tablet Take 180 mg by mouth daily      fluticasone-vilanterol (BREO ELLIPTA) 200-25 MCG/INH inhaler Inhale 1 puff daily Rinse mouth after use. 3 Inhaler 3    furosemide (LASIX) 40 mg tablet Take 1 tablet (40 mg total) by mouth daily 90 tablet 0    LORazepam (ATIVAN) 2 mg tablet Take 1 tablet (2 mg total) by mouth every 8 (eight) hours as needed for anxiety 90 tablet 2    methocarbamol (ROBAXIN) 500 mg tablet Take 2 tablets (1,000 mg total) by mouth 3 (three) times a day 180 tablet 5    metoprolol succinate (TOPROL-XL) 100 mg 24 hr tablet Take 1 tablet (100 mg total) by mouth 2 (two) times a day 180 tablet 1    omeprazole (PriLOSEC) 40 MG capsule Take 1 capsule (40 mg total) by mouth 2 (two) times a day before meals 60 capsule 5    ondansetron (ZOFRAN-ODT) 8 mg disintegrating tablet Take 1 tablet (8 mg total) by mouth every 12 (twelve) hours as needed for nausea or vomiting 30 tablet 2    phenazopyridine (PYRIDIUM) 200 mg tablet Take 1 tablet (200 mg total) by mouth 2 (two) times a day as needed for bladder spasms 30 tablet 5    polyethylene glycol (MIRALAX) 17 g packet Take 17 g by mouth daily as needed (constipation)    "   promethazine (PHENERGAN) 25 mg tablet Take 1 tablet (25 mg total) by mouth every 6 (six) hours as needed for nausea or vomiting 60 tablet 5    senna (SENOKOT) 8.6 mg Take 1 tablet (8.6 mg total) by mouth daily as needed for constipation      spironolactone (ALDACTONE) 25 mg tablet TAKE ONE TABLET BY MOUTH TWICE DAILY 180 tablet 1    traMADol (ULTRAM) 50 mg tablet Take 2 tablets (100 mg total) by mouth every 8 (eight) hours as needed for moderate pain 180 tablet 0    triamcinolone (KENALOG) 0.1 % cream Apply topically 2 (two) times a day 15 g 2    clopidogrel (Plavix) 75 mg tablet Take 1 tablet (75 mg total) by mouth daily (Patient not taking: Reported on 2024) 30 tablet 3    LORazepam (ATIVAN) 1 mg tablet Take 1 tablet (1 mg total) by mouth 3 (three) times a day (Patient not taking: Reported on 2024) 90 tablet 0    magnesium Oxide (MAG-OX) 400 mg TABS Take 1 tablet (400 mg total) by mouth daily at bedtime (Patient not taking: Reported on 4/10/2024) 90 tablet 3     Current Facility-Administered Medications on File Prior to Visit   Medication Dose Route Frequency Provider Last Rate Last Admin    cyanocobalamin injection 1,000 mcg  1,000 mcg Intramuscular Q30 Days Coy Ayoub,    1,000 mcg at 23 1151      Social History     Tobacco Use    Smoking status: Former     Current packs/day: 0.00     Average packs/day: 0.5 packs/day for 10.0 years (5.0 ttl pk-yrs)     Types: Cigarettes     Start date:      Quit date: 2019     Years since quittin.5     Passive exposure: Past    Smokeless tobacco: Never   Vaping Use    Vaping status: Never Used   Substance and Sexual Activity    Alcohol use: Not Currently    Drug use: No    Sexual activity: Not Currently     Comment: defer     Objective     /90 (BP Location: Left arm, Patient Position: Sitting, Cuff Size: Standard)   Pulse 74   Ht 5' 4\" (1.626 m)   Wt 87.5 kg (193 lb)   LMP  (LMP Unknown)   SpO2 96%   BMI 33.13 kg/m²     Physical " Exam  Constitutional:       General: She is not in acute distress.     Appearance: She is well-developed. She is not ill-appearing, toxic-appearing or diaphoretic.   HENT:      Head: Normocephalic and atraumatic.      Right Ear: External ear normal.      Left Ear: External ear normal.   Eyes:      General: No scleral icterus.     Conjunctiva/sclera: Conjunctivae normal.   Neck:      Trachea: No tracheal deviation.      Comments: Left supraclavicular neck incision is well-healed.  Cardiovascular:      Rate and Rhythm: Normal rate and regular rhythm.   Pulmonary:      Effort: Pulmonary effort is normal.   Abdominal:      Palpations: Abdomen is soft. Mass: no appreciable aortic pulsation/aneurysm.      Tenderness: There is no guarding.   Musculoskeletal:         General: Normal range of motion.      Cervical back: Normal range of motion and neck supple.   Skin:     General: Skin is warm and dry.   Neurological:      Mental Status: She is alert and oriented to person, place, and time.   Psychiatric:         Mood and Affect: Mood normal.         Behavior: Behavior normal.         Thought Content: Thought content normal.         Judgment: Judgment normal.       Carotid duplex:  CONCLUSION:     Impression  RIGHT:  There is <50% stenosis noted in the internal carotid artery. Plaque is  heterogenous and irregular.  Vertebral artery flow is antegrade. There is no significant subclavian artery  disease.     LEFT:  There is a widely patent internal carotid artery and stent.  Vertebral artery flow is antegrade however severely diminished suggesting more  proximal disease. There is no significant subclavian artery disease.     Compared to previous study on 2-23-24, there is improvement in the disease  process on the left s/p TCAR.  Recommend repeat testing in 6 months as per protocol unless otherwise  indicated.  Administrative Statements   I have spent a total time of 20 minutes in caring for this patient on the day of the  visit/encounter including Diagnostic results, Prognosis, Risks and benefits of tx options, Instructions for management, Patient and family education, Importance of tx compliance, Risk factor reductions, Impressions, Counseling / Coordination of care, Documenting in the medical record, Reviewing / ordering tests, medicine, procedures  , and Obtaining or reviewing history  .

## 2024-07-12 NOTE — ASSESSMENT & PLAN NOTE
"Adina presents to the office to review surveillance carotid duplex.  She underwent a left TCAR on 4/29/2024.    She denies any focal neurologic deficits.  She does have admitted baseline anxiety and has noticed difficulty \"swallowing\" when she is anxious.    Her surveillance carotid duplex demonstrates a widely patent stent.  There is no evidence of significant contralateral stenosis.  Continue Plavix.  Continue surveillance with 6-month duplex.  Of note patient does mention she has an incisional hernia from prior abdominal surgery.  From a vascular standpoint there is no contraindications to proceeding with hernia repair.  If needed she may stop her Plavix x 7 days.  "

## 2024-07-14 DIAGNOSIS — K21.9 GASTROESOPHAGEAL REFLUX DISEASE WITHOUT ESOPHAGITIS: ICD-10-CM

## 2024-07-15 ENCOUNTER — NURSE TRIAGE (OUTPATIENT)
Age: 64
End: 2024-07-15

## 2024-07-15 RX ORDER — CHLORDIAZEPOXIDE HYDROCHLORIDE AND CLIDINIUM BROMIDE 5; 2.5 MG/1; MG/1
1 CAPSULE ORAL DAILY PRN
Qty: 90 CAPSULE | Refills: 0 | Status: SHIPPED | OUTPATIENT
Start: 2024-07-15

## 2024-07-15 NOTE — TELEPHONE ENCOUNTER
Spoke with patient regarding weight gain and mild LE edema. Takes Lasix 40mg daily.    Previously reported weights that were addressed with additional doses of Lasix:  June 21st  190lbs  June 25th  187lbs    Vascular appointment on July 12th  193lbs  Today 197lbs.    Denies shortness of breath, states mild BLE edema.    Per Dr Pantoja on June 25th, patient can take an additional dose Lasix 40mg if edema worsens again. Advised patient to take an additional 40mg now and await recommendation from provider.    Has follow up scheduled for July 18th.    Please advise.

## 2024-07-15 NOTE — TELEPHONE ENCOUNTER
"  Reason for Disposition  • MILD swelling of both ankles (i.e., pedal edema) AND new-onset or worsening    Answer Assessment - Initial Assessment Questions  1. ONSET: \"When did the swelling start?\" (e.g., minutes, hours, days)      Ongoing, increases, decreases  2. LOCATION: \"What part of the leg is swollen?\"  \"Are both legs swollen or just one leg?\"      Feet/ankles  3. SEVERITY: \"How bad is the swelling?\" (e.g., localized; mild, moderate, severe)   - Localized - small area of swelling localized to one leg   - MILD pedal edema - swelling limited to foot and ankle, pitting edema < 1/4 inch (6 mm) deep, rest and elevation eliminate most or all swelling   - MODERATE edema - swelling of lower leg to knee, pitting edema > 1/4 inch (6 mm) deep, rest and elevation only partially reduce swelling   - SEVERE edema - swelling extends above knee, facial or hand swelling present       Mild, socks leave marks  4. REDNESS: \"Does the swelling look red or infected?\"      denies  5. PAIN: \"Is the swelling painful to touch?\" If Yes, ask: \"How painful is it?\"   (Scale 1-10; mild, moderate or severe)      denies  6. FEVER: \"Do you have a fever?\" If Yes, ask: \"What is it, how was it measured, and when did it start?\"       denies  7. CAUSE: \"What do you think is causing the leg swelling?\"      Ongoing issue  8. MEDICAL HISTORY: \"Do you have a history of heart failure, kidney disease, liver failure, or cancer?\"      Afib, HTN, aortic regurgitation, sleep apnea, LE swelling  9. RECURRENT SYMPTOM: \"Have you had leg swelling before?\" If Yes, ask: \"When was the last time?\" \"What happened that time?\"      Yes, increased lasix dose  10. OTHER SYMPTOMS: \"Do you have any other symptoms?\" (e.g., chest pain, difficulty breathing)        denies  11. PREGNANCY: \"Is there any chance you are pregnant?\" \"When was your last menstrual period?\"        na    Protocols used: Leg Swelling and Edema-ADULT-[place]    "

## 2024-07-16 ENCOUNTER — TELEPHONE (OUTPATIENT)
Age: 64
End: 2024-07-16

## 2024-07-16 NOTE — TELEPHONE ENCOUNTER
She can take additional Lasix today and tomorrow with additional dietary potassium, continue logging weights, will see on July 18 as scheduled, thank you

## 2024-07-16 NOTE — TELEPHONE ENCOUNTER
Contact was made with  Karen and she is now aware of Dr. Lynn's instructions. Patient verbally states that she understands and no questions or concerns at this time.

## 2024-07-16 NOTE — TELEPHONE ENCOUNTER
Tiffanie from [facility] called in to check on the prescription check. Did warm transfer the call to practice clinical was answered by Dana Israel she took her call to assist. Thanks

## 2024-07-17 ENCOUNTER — OFFICE VISIT (OUTPATIENT)
Dept: OBGYN CLINIC | Facility: MEDICAL CENTER | Age: 64
End: 2024-07-17
Payer: COMMERCIAL

## 2024-07-17 VITALS
BODY MASS INDEX: 32.95 KG/M2 | HEIGHT: 64 IN | WEIGHT: 193 LBS | SYSTOLIC BLOOD PRESSURE: 128 MMHG | HEART RATE: 68 BPM | DIASTOLIC BLOOD PRESSURE: 84 MMHG

## 2024-07-17 DIAGNOSIS — S83.272A COMPLEX TEAR OF LATERAL MENISCUS OF LEFT KNEE AS CURRENT INJURY, INITIAL ENCOUNTER: ICD-10-CM

## 2024-07-17 DIAGNOSIS — M17.12 PRIMARY OSTEOARTHRITIS OF LEFT KNEE: ICD-10-CM

## 2024-07-17 DIAGNOSIS — M25.562 ACUTE PAIN OF LEFT KNEE: Primary | ICD-10-CM

## 2024-07-17 PROCEDURE — 99213 OFFICE O/P EST LOW 20 MIN: CPT | Performed by: EMERGENCY MEDICINE

## 2024-07-17 NOTE — PROGRESS NOTES
"    Assessment/Plan:    Diagnoses and all orders for this visit:    Acute pain of left knee    Complex tear of lateral meniscus of left knee as current injury, initial encounter    Primary osteoarthritis of left knee    Improved, declines CSI    Return if symptoms worsen or fail to improve.      Subjective:   Patient ID: Nancy Jones is a 64 y.o. female.    Patient returns for left knee pain which is improved, she is able to perform ADLs and notes periods with no pain.  She is looking at getting a knee sleeve    Initial note:  NP presents for 2 months of left knee pain.  She states she bumped it against a sharp edge of furniture which caused pain however about a week lateral pain increased. Pain is intermittent, maybe worse with prolonged walking.  Denies painful p/c, no instability.  Takes Tramadol or IBU PRN (However A fib)  She did have some pain with stairs prior to injury.        Review of Systems    The following portions of the patient's chart were reviewed and updated as appropriate:   Allergy:    Allergies   Allergen Reactions   • Ace Inhibitors Angioedema and Anaphylaxis     Anaphylaxis   • Benicar [Olmesartan] Angioedema     See Allergy note from 9/11/2008.  Swollen ankles/legs   • Hydralazine Other (See Comments)     Lip swelling  Flank pain   • Other Anaphylaxis and Other (See Comments)     Preservatives- itching throat closes, hi  E Z Cat scan contrast - hives throat closes itching,  Preservatives- itching throat closes, hi  Artificial sweeteners   • Valsartan Angioedema     Lips, face swollen   • Ampicillin Hives     Depends on brand some preservatives can react. Has recently tolerated oral amoxicillin.   • Sulfa Antibiotics Hives     stuffiness,itching,hives,throat closing--per pt \"sometimes able to take depending on the brand and the filler or possibly preservatives in it\"   • Motrin [Ibuprofen] Other (See Comments)     Per pt \" told not to take due to A Fib\"   • Wound Dressing Adhesive Other (See " Comments)     Per pt Adhesive on EKG leads caused redness       Medications:    Current Outpatient Medications:   •  acetaminophen (TYLENOL) 325 mg tablet, Take 2 tablets (650 mg total) by mouth every 6 (six) hours, Disp: , Rfl: 0  •  amiodarone 100 mg tablet, Take 1 tablet (100 mg total) by mouth daily, Disp: 90 tablet, Rfl: 3  •  apixaban (Eliquis) 5 mg, TAKE ONE TABLET BY MOUTH TWICE DAILY, Disp: 180 tablet, Rfl: 1  •  aspirin (ECOTRIN LOW STRENGTH) 81 mg EC tablet, Take 81 mg by mouth daily, Disp: , Rfl:   •  chlordiazepoxide-clidinium (LIBRAX) 5-2.5 mg per capsule, Take 1 capsule by mouth daily as needed for cramping, Disp: 90 capsule, Rfl: 0  •  Diclofenac Sodium (VOLTAREN) 1 %, Apply 2 g topically 4 (four) times a day, Disp: 100 g, Rfl: 2  •  diltiazem (CARDIZEM CD) 120 mg 24 hr capsule, TAKE ONE CAPSULE BY MOUTH ONCE DAILY, Disp: 90 capsule, Rfl: 1  •  Docusate Sodium (COLACE PO), Take by mouth daily at bedtime, Disp: , Rfl:   •  Evolocumab (Repatha SureClick) 140 MG/ML SOAJ, Inject 1 mL (140 mg total) under the skin every 14 (fourteen) days, Disp: 2 mL, Rfl: 11  •  fexofenadine (ALLEGRA) 180 MG tablet, Take 180 mg by mouth daily, Disp: , Rfl:   •  fluticasone-vilanterol (BREO ELLIPTA) 200-25 MCG/INH inhaler, Inhale 1 puff daily Rinse mouth after use., Disp: 3 Inhaler, Rfl: 3  •  furosemide (LASIX) 40 mg tablet, Take 1 tablet (40 mg total) by mouth daily, Disp: 90 tablet, Rfl: 0  •  LORazepam (ATIVAN) 2 mg tablet, Take 1 tablet (2 mg total) by mouth every 8 (eight) hours as needed for anxiety, Disp: 90 tablet, Rfl: 2  •  methocarbamol (ROBAXIN) 500 mg tablet, Take 2 tablets (1,000 mg total) by mouth 3 (three) times a day, Disp: 180 tablet, Rfl: 5  •  metoprolol succinate (TOPROL-XL) 100 mg 24 hr tablet, Take 1 tablet (100 mg total) by mouth 2 (two) times a day, Disp: 180 tablet, Rfl: 1  •  omeprazole (PriLOSEC) 40 MG capsule, Take 1 capsule (40 mg total) by mouth 2 (two) times a day before meals, Disp: 60  capsule, Rfl: 5  •  ondansetron (ZOFRAN-ODT) 8 mg disintegrating tablet, Take 1 tablet (8 mg total) by mouth every 12 (twelve) hours as needed for nausea or vomiting, Disp: 30 tablet, Rfl: 2  •  phenazopyridine (PYRIDIUM) 200 mg tablet, Take 1 tablet (200 mg total) by mouth 2 (two) times a day as needed for bladder spasms, Disp: 30 tablet, Rfl: 5  •  polyethylene glycol (MIRALAX) 17 g packet, Take 17 g by mouth daily as needed (constipation), Disp: , Rfl:   •  promethazine (PHENERGAN) 25 mg tablet, Take 1 tablet (25 mg total) by mouth every 6 (six) hours as needed for nausea or vomiting, Disp: 60 tablet, Rfl: 5  •  senna (SENOKOT) 8.6 mg, Take 1 tablet (8.6 mg total) by mouth daily as needed for constipation, Disp: , Rfl:   •  spironolactone (ALDACTONE) 25 mg tablet, TAKE ONE TABLET BY MOUTH TWICE DAILY, Disp: 180 tablet, Rfl: 1  •  traMADol (ULTRAM) 50 mg tablet, Take 2 tablets (100 mg total) by mouth every 8 (eight) hours as needed for moderate pain, Disp: 180 tablet, Rfl: 0  •  triamcinolone (KENALOG) 0.1 % cream, Apply topically 2 (two) times a day, Disp: 15 g, Rfl: 2  •  clopidogrel (Plavix) 75 mg tablet, Take 1 tablet (75 mg total) by mouth daily (Patient not taking: Reported on 7/12/2024), Disp: 30 tablet, Rfl: 3  •  LORazepam (ATIVAN) 1 mg tablet, Take 1 tablet (1 mg total) by mouth 3 (three) times a day (Patient not taking: Reported on 7/12/2024), Disp: 90 tablet, Rfl: 0  •  magnesium Oxide (MAG-OX) 400 mg TABS, Take 1 tablet (400 mg total) by mouth daily at bedtime (Patient not taking: Reported on 4/10/2024), Disp: 90 tablet, Rfl: 3    Current Facility-Administered Medications:   •  cyanocobalamin injection 1,000 mcg, 1,000 mcg, Intramuscular, Q30 Days, Coy Ayoub DO, 1,000 mcg at 11/16/23 1151    Patient Active Problem List   Diagnosis   • Angina pectoris (HCC)   • Essential hypertension   • Migraines   • AMD (age-related macular degeneration), bilateral   • Allergic reaction   • Gastroesophageal  "reflux disease without esophagitis   • Angio-edema   • A-fib ([facility])   • Lumbar radiculopathy   • CAMILLE (obstructive sleep apnea)   • Hypertensive heart disease with congestive heart failure (HCC)   • Unspecified diastolic (congestive) heart failure (HCC)   • 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HCC)   • COPD (chronic obstructive pulmonary disease) (HCC)   • Urinary retention   • Peripheral vascular disease ([facility])   • Appendix disease   • Carotid artery stenosis   • Stenosis of left carotid artery   • Asymptomatic stenosis of left carotid artery   • Hepatic steatosis   • Partial facial nerve palsy   • Injury of left facial nerve   • Paresthesia   • Colitis   • Chronic migraine w/o aura w/o status migrainosus, not intractable   • IIH (idiopathic intracranial hypertension)   • Hematochezia   • Left carotid stenosis   • Colonic ischemia ([facility])   • Acute postoperative pain   • S/P small bowel resection   • Anxiety   • Omental infarction ([facility])   • Hypokalemia   • Night sweats   • Dysuria   • Incisional hernia, without obstruction or gangrene   • Stage 3 chronic kidney disease, unspecified whether stage 3a or 3b CKD ([facility])   • Ventral hernia without obstruction or gangrene   • Atherosclerosis of native arteries of extremities with rest pain, bilateral legs ([facility])   • Acute on chronic diastolic (congestive) heart failure ([facility])   • Left facial numbness   • Other chest pain   • Acute pain of left knee   • Acute lateral meniscus tear of left knee   • Continuous opioid dependence ([facility])   • Obesity, morbid ([facility])   • Complex tear of lateral meniscus of left knee as current injury, initial encounter       Objective:  /84   Pulse 68   Ht 5' 4\" (1.626 m)   Wt 87.5 kg (193 lb)   LMP  (LMP Unknown)   BMI 33.13 kg/m²     Left Knee Exam     Other   Erythema: absent  Swelling: mild        Physical Exam      Neurologic Exam    Procedures    I have personally reviewed the written report of the pertinent studies.     MRI Knee  IMPRESSION:   " "  Complex predominantly horizontal tear through the body of the lateral meniscus extending to its junction with the anterior horn.     Minimal edema surround the fibers of the medial collateral ligament suggestive of a mild grade 1 sprain.     Mild marrow edema lateral femoral condyle and lateral tibial plateau may reflect contusion or stress response.     Small joint effusion and small to moderate sized Baker's cyst.        Xray reveals mild OA            Past Medical History:   Diagnosis Date   • A-fib ([facility]) 03/2020   • Allergic    • Anxiety    • Arthritis    • Asthma    • Back pain     and muscle pain   • Bruises easily    • Chronic pain disorder     Interstitial pain   • Colon polyp    • Dental bridge present    • Depression    • Eczema    • GERD (gastroesophageal reflux disease)    • Heart murmur    • History of blood clots     per pt \"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\"   • History of pneumonia    • Hives    • Hyperlipidemia    • Hypertension    • Infertility, female    • Interstitial cystitis    • Migraine     sees neurologist   • Motion sickness    • Obesity    • Palpitations    • PONV (postoperative nausea and vomiting)    • Premature atrial contractions 11/09/2022   • Psychiatric disorder    • TIA (transient ischemic attack) 09/2022    per pt --\"had MRI and 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\"   • Urinary incontinence     wears Pads   • Venous insufficiency 08/01/2017   • Wears glasses        Past Surgical History:   Procedure Laterality Date   • COLONOSCOPY     • DILATION AND CURETTAGE OF UTERUS     • EGD     • EXPLORATORY LAPAROTOMY      for infertility   • EXPLORATORY LAPAROTOMY W/ BOWEL RESECTION N/A 7/19/2023    Procedure: LAPAROTOMY EXPLORATORY W/ BOWEL RESECTION;  Surgeon: Crista Recinos MD;  [facility];  Service: General   • HAND SURGERY      Hand excision of tendon cyst   • SC LAPAROSCOPIC APPENDECTOMY N/A 05/03/2022    Procedure: APPENDECTOMY LAPAROSCOPIC;  " Surgeon: Vin Fields MD;  [facility];  Service: General   • WA LAPS ABD PRTM&OMENTUM DX W/WO SPEC BR/WA SPX N/A 2023    Procedure: LAPAROSCOPY DIAGNOSTIC, LYSIS OF ADHESIONS, LAPAROSCOPY TAKE TAKEDOWN OF LEFT COLON, EXPLORATORY LAPAROSCOPY, LYSIS OF ADHESIONS, RESECTION OF TRANSVERSE COLON SPLENIC FLEXURE AND DESCENDING COLON , TAKE DOWN OF SPLENIC FLEXURE, SIGMOIDOSCOPY, MOBILIZATION OF RIGHT COLON, PRIMARY ANASTOMOSIS EEA 29, TAP BLOCK;  Surgeon: Crista Recinos MD;  [facility];  Service: General   • WA TCAT IV STENT CRV CRTD ART EMBOLIC PROTECJ Left 2024    Procedure: ANGIOPLASTY ARTERY CAROTID W/ STENT;  Surgeon: Roberto García DO;  [facility];  Service: Vascular   • WA TEAEC W/PATCH GRF CAROTID VERTB SUBCLAV NECK INC Left 2023    Procedure: EXPLORATION OF LEFT CAROTID ARTERY; ABORTED ENDARTERECTOMY ARTERY CAROTID;  Surgeon: Roberto García DO;  [facility];  Service: Vascular   • SUPERIOR MESTENTERIC ARTERY STENT     • WISDOM TOOTH EXTRACTION         Social History     Socioeconomic History   • Marital status:      Spouse name: Not on file   • Number of children: 0   • Years of education: Not on file   • Highest education level: Not on file   Occupational History   • Occupation: retired   Tobacco Use   • Smoking status: Former     Current packs/day: 0.00     Average packs/day: 0.5 packs/day for 10.0 years (5.0 ttl pk-yrs)     Types: Cigarettes     Start date:      Quit date: 2019     Years since quittin.5     Passive exposure: Past   • Smokeless tobacco: Never   Vaping Use   • Vaping status: Never Used   Substance and Sexual Activity   • Alcohol use: Not Currently   • Drug use: No   • Sexual activity: Not Currently     Comment: defer   Other Topics Concern   • Not on file   Social History Narrative    Who lives in your home: Alone     What type of home do you live in: Apartment     Age of your home: 1952 built     How long have  you been living there: 5 yrs     Type of heat: forced hot air     Type of fuel: electric     What type of jamin is in your bedroom: carpet jamin     Do you have the following in or near your home:    Air products: Humidifier in the bedroom/kitchen     Pests: none     Pets: none     Are pets allowed in bedroom: N/A     Open fields, wooded areas nearby: No     Basement: [id number] mold     Exposure to second hand smoke: No    Central air     Habits:    Caffeine: Hot tea 1 cup daily- ice tea 1 cup in the afternoon    Chocolate: Occasionally      Social Determinants of Health     Financial Resource Strain: Medium Risk (10/2/2023)    Overall Financial Resource Strain (CARDIA)    • Difficulty of Paying Living Expenses: Somewhat hard   Food Insecurity: No Food Insecurity (2/21/2023)    Hunger Vital Sign    • Worried About Running Out of Food in the Last Year: Never true    • Ran Out of Food in the Last Year: Never true   Transportation Needs: No Transportation Needs (10/2/2023)    PRAPARE - Transportation    • Lack of Transportation (Medical): No    • Lack of Transportation (Non-Medical): No   Physical Activity: Inactive (5/24/2021)    Exercise Vital Sign    • Days of Exercise per Week: 0 days    • Minutes of Exercise per Session: 0 min   Stress: Not on file   Social Connections: Unknown (6/18/2024)    Received from [employer]    Social Barcoding    • How often do you feel lonely or isolated from those around you? (Adult - for ages 18 years and over): Not on file   Intimate Partner Violence: Not At Risk (5/24/2021)    Humiliation, Afraid, Rape, and Kick questionnaire    • Fear of Current or Ex-Partner: No    • Emotionally Abused: No    • Physically Abused: No    • Sexually Abused: No   Housing Stability: Low Risk  (7/20/2023)    Housing Stability Vital Sign    • Unable to Pay for Housing in the Last Year: No    • Number of Times Moved in the Last Year: 1    • Homeless in the Last Year: No        Family History   Problem Relation Age of Onset   • Lung cancer Mother 60   • Brain cancer Mother 60   • Diabetes Father    • Depression Father    • Other Father         septic   • Allergies Sister    • Hashimoto's thyroiditis Sister    • Abdominal aortic aneurysm Sister    • Diabetes Sister    • No Known Problems Sister    • No Known Problems Maternal Grandmother    • No Known Problems Maternal Grandfather    • No Known Problems Paternal Grandmother    • No Known Problems Paternal Grandfather    • Hodgkin's lymphoma Brother    • No Known Problems Brother    • No Known Problems Maternal Aunt    • Diabetes Other    • Breast cancer Neg Hx

## 2024-07-18 ENCOUNTER — OFFICE VISIT (OUTPATIENT)
Dept: CARDIOLOGY CLINIC | Facility: CLINIC | Age: 64
End: 2024-07-18
Payer: COMMERCIAL

## 2024-07-18 ENCOUNTER — TELEPHONE (OUTPATIENT)
Dept: CARDIOLOGY CLINIC | Facility: CLINIC | Age: 64
End: 2024-07-18

## 2024-07-18 ENCOUNTER — TELEPHONE (OUTPATIENT)
Age: 64
End: 2024-07-18

## 2024-07-18 ENCOUNTER — PATIENT OUTREACH (OUTPATIENT)
Dept: CASE MANAGEMENT | Facility: OTHER | Age: 64
End: 2024-07-18

## 2024-07-18 VITALS
WEIGHT: 195 LBS | DIASTOLIC BLOOD PRESSURE: 60 MMHG | SYSTOLIC BLOOD PRESSURE: 108 MMHG | HEIGHT: 64 IN | BODY MASS INDEX: 33.29 KG/M2 | HEART RATE: 59 BPM

## 2024-07-18 DIAGNOSIS — Z01.810 PRE-OPERATIVE CARDIOVASCULAR EXAMINATION: Primary | ICD-10-CM

## 2024-07-18 DIAGNOSIS — F41.9 ANXIETY: ICD-10-CM

## 2024-07-18 PROCEDURE — 93000 ELECTROCARDIOGRAM COMPLETE: CPT | Performed by: INTERNAL MEDICINE

## 2024-07-18 PROCEDURE — 99214 OFFICE O/P EST MOD 30 MIN: CPT | Performed by: INTERNAL MEDICINE

## 2024-07-18 RX ORDER — LORAZEPAM 2 MG/1
2 TABLET ORAL EVERY 8 HOURS PRN
Qty: 90 TABLET | Refills: 0 | Status: SHIPPED | OUTPATIENT
Start: 2024-07-18

## 2024-07-18 NOTE — TELEPHONE ENCOUNTER
Received call from pt. She forgot to ask earlier if she should continue taking extra Lasix 40 mg tablet and Potassium in the afternoon, which she has been doing for the last 3 days.    Please advise.

## 2024-07-18 NOTE — TELEPHONE ENCOUNTER
Pt has an appt on 08/12 with Dr. Recinos. She just got her clearances for surgery and asked if she could be seen sooner.She is on the wait list, but explained that she is only in office on 07/22 and 07/29 prior to her appt.

## 2024-07-18 NOTE — TELEPHONE ENCOUNTER
Fax received from [employer] that medical record documentation of genetic testing to confirm dx.     Scanned into chart.

## 2024-07-18 NOTE — TELEPHONE ENCOUNTER
Patient's [employer] insurance was calling for an updated on forms they faxed to the office. They were scanned into the chart.     She is asking if after they are completed, can they please be sent to fax number [phone]

## 2024-07-18 NOTE — TELEPHONE ENCOUNTER
Patient called to ask if she needs to hold her baby aspirin before a hernia surgery she plans to have. There is no date for the surgery yet. She meets with Dr. Recinos on 8/12 to discuss her options. Please advise.

## 2024-07-18 NOTE — TELEPHONE ENCOUNTER
Fax received from [employer] requesting PA for Repatha 140mg/mL auto-injectors.     Scanned into chart.

## 2024-07-18 NOTE — TELEPHONE ENCOUNTER
Codi from patients health insurance called the rx refill line in regards to form faxed from [employer] [facility] that is scanned in chart and forwarded to Cardiology prio auth    The form needs to be completed and faxed by 8am tomorrow     Please contact Codi at     [phone] phone  [phone] fax     [id number] case number

## 2024-07-18 NOTE — TELEPHONE ENCOUNTER
PA for repatha 140 mg/ml submitted     Submitted via    [x]CMM-KEY [id number]  [][employer]-Case ID #   []Faxed to plan   []Other website   []Phone call Case ID #     Office notes sent, clinical questions answered. Awaiting determination    Turnaround time for your insurance to make a decision on your Prior Authorization can take 7-21 business days.

## 2024-07-18 NOTE — PROGRESS NOTES
"      Cardiology             Nancy Jones  1960  [id number]                Assessment/Plan:     Paroxysmal atrial fibrillation, diagnosed on Holter monitor 03/2020, with recurrence during episode of GI bleed 7/2023, now on amiodarone suppression, maintained on Eliquis anticoagulation  Hypertension  Multiple drug intolerances and possible allergies  Probable heterozygous familial hypercholesterolemia, on Repatha  Mild to moderate aortic regurgitation  Obesity   Sleep apnea, compliant with CPAP therapy   Lower extremity edema, on furosemide, resolved              Prior nuclear stress test, echocardiogram 1/2024 both unremarkable.  Zio monitor 3/2024 unremarkable.  She tolerated left TCAR well without cardiac issues.  Suspect low to moderate cardiac risk for hernia surgery.  Okay to hold Eliquis 2 days prior.  Patient requested to make sure that is okay with vascular surgery to hold aspirin in light of recent TCAR  Sinus rhythm on ECG, continue amiodarone  Continue Eliquis anticoagulation  Patient taken off Plavix and placed on aspirin by PCP after she had some potential side effects with Plavix.  Continue Repatha, metoprolol, diltiazem  Continue spironolactone.  Volume status seems compensated.  Blood pressure well-controlled.  Home log reviewed         Follow-up with me in 6 months.               Interval History:      This is a 64-year-old female diagnosed with paroxysmal atrial fibrillation on Holter monitoring 3/020.  That time echocardiogram had revealed mild-to-moderate aortic regurgitation with normal left ventricular systolic function.  Nuclear stress test June 2020 was unremarkable with normal perfusion.     She has hypertension, and has multiple medications listed as allergies including beta-blockers, although she has been taking metoprolol for quite some time.  Although on her allergy list HCTZ states “throat closing,\" she states that actually cause some ankle swelling along with amlodipine.  " Atorvastatin caused joint pain, although it is listed in her allergies also as “throat closing. ”  She seems to have had a true allergic reaction to hydralazine and valsartan.  She states valsartan caused angioedema, and she does not remember what happened with hydralazine, although states she thinks it may have been ankle swelling.     She was last seen by Dr. Ray 01/28/2021 at which time she was maintained on metoprolol succinate and spironolactone for hypertension.  At 1 point she was asked to take diltiazem as needed for palpitations.  She has been maintained on Eliquis anticoagulation.       She underwent a Zio monitor 03/2021 at [facility](should be scanned into media section, personally reviewed rhythm strips) revealing normal sinus rhythm with 8 runs of SVT, longest lasting 17 beats.  There were 18 patient triggers, most of which correlated with PACs, short atrial runs, and PVCs.  There was no evidence of atrial fibrillation.     Nuclear stress test 06/2020 was unremarkable     In August 2021 she was hospitalized at [facility] for epigastric and left upper quadrant pain, admitted for superior mesenteric artery thrombosis.  She received heparin drip, and underwent SMA stenting with IR on 08/19/2021.  She was initiated on Plavix, and continued on Eliquis.     During her prior visit 10/15/2021 she was initiated on diltiazem which she later stopped due to headache and nausea.  Subsequent nifedipine was tried which caused headache and nausea as well which she stopped on her own.  Verapamil caused increase in palpitations.     She went to [facility] ER due to urinary retention and was reported to be in atrial fibrillation.  ZIO monitor 10/2022 revealed episodes of paroxysmal atrial fibrillation which correlated with her triggered symptoms.  Echocardiogram 12/16/2022 demonstrated normal left ejection fraction of 55% with mild mitral and aortic regurgitation.  Nuclear stress test 1/17/2023 demonstrated  no evidence of myocardial ischemia.  She was seen by electrophysiology and was initiated on flecainide which she did not tolerate well due to side effects.  She refused ablation, therefore was placed on diltiazem.     On 1/31/2023 she underwent exploration of left carotid artery for CEA, and this was aborted, as the left carotid bifurcation was high and deep in the neck with the hypoglossal nerve overriding the bifurcation.  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     Echocardiogram 2/21/2023 demonstrated left ejection fraction 60% with mild right ventricular dilatation and mild to moderate aortic regurgitation.     During her prior visit 3/20/2023, diltiazem was increased from 120 mg to 180 mg daily in light of recurrent atrial fibrillation with RVR at 111 bpm.  She refused another consultation with electrophysiology.  She was unable to tolerate flecainide due to increased palpitations, therefore was reluctant to try any more medications and refused ablation as well.     She was started on Diamox thereafter by neurology for signs of increased intracranial pressure on MRI.     During Adina's last visit 1/2023 she had complaints of chest discomfort, dyspnea, and palpitations.  Nuclear stress test 1/31/2024 was unremarkable.  Zio monitor 3/6/2024 was within normal limits with symptoms correlating with sinus rhythm only.  Echocardiogram 1/31/2024 demonstrated normal left ventricular systolic function, with ejection fraction 65% with mild aortic regurgitation.     She underwent successful left TCAR on 4/29/2024.    She presents today for preoperative cardiac clearance prior to hernia surgery.  From a cardiovascular symptomatic standpoint she feels well without chest pain or shortness of breath.  She denies lower extreme edema, lightheadedness, palpitations.        Vitals:  Vitals:    07/18/24 1311   BP: 108/60  "  Pulse: 59   Weight: 88.5 kg (195 lb)   Height: 5' 4\" (1.626 m)         Past Medical History:   Diagnosis Date   • A-fib ([facility]) 2020   • Allergic    • Anxiety    • Arthritis    • Asthma    • Back pain     and muscle pain   • Bruises easily    • Chronic pain disorder     Interstitial pain   • Colon polyp    • Dental bridge present    • Depression    • Eczema    • GERD (gastroesophageal reflux disease)    • Heart murmur    • History of blood clots     per pt \"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\"   • History of pneumonia    • Hives    • Hyperlipidemia    • Hypertension    • Infertility, female    • Interstitial cystitis    • Migraine     sees neurologist   • Motion sickness    • Obesity    • Palpitations    • PONV (postoperative nausea and vomiting)    • Premature atrial contractions 2022   • Psychiatric disorder    • TIA (transient ischemic attack) 2022    per pt --\"had MRI and 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\"   • Urinary incontinence     wears Pads   • Venous insufficiency 2017   • Wears glasses      Social History     Socioeconomic History   • Marital status:      Spouse name: Not on file   • Number of children: 0   • Years of education: Not on file   • Highest education level: Not on file   Occupational History   • Occupation: retired   Tobacco Use   • Smoking status: Former     Current packs/day: 0.00     Average packs/day: 0.5 packs/day for 10.0 years (5.0 ttl pk-yrs)     Types: Cigarettes     Start date:      Quit date: 2019     Years since quittin.5     Passive exposure: Past   • Smokeless tobacco: Never   Vaping Use   • Vaping status: Never Used   Substance and Sexual Activity   • Alcohol use: Not Currently   • Drug use: No   • Sexual activity: Not Currently     Comment: defer   Other Topics Concern   • Not on file   Social History Narrative    Who lives in your home: Alone     What type of home do you live in: Apartment     Age of your home: 1952 " built     How long have you been living there: 5 yrs     Type of heat: forced hot air     Type of fuel: electric     What type of jamin is in your bedroom: carpet jamin     Do you have the following in or near your home:    Air products: Humidifier in the bedroom/kitchen     Pests: none     Pets: none     Are pets allowed in bedroom: N/A     Open fields, wooded areas nearby: No     Basement: [id number]-mumkj-no mold     Exposure to second hand smoke: No    Central air     Habits:    Caffeine: Hot tea 1 cup daily- ice tea 1 cup in the afternoon    Chocolate: Occasionally      Social Determinants of Health     Financial Resource Strain: Medium Risk (10/2/2023)    Overall Financial Resource Strain (CARDIA)    • Difficulty of Paying Living Expenses: Somewhat hard   Food Insecurity: No Food Insecurity (2/21/2023)    Hunger Vital Sign    • Worried About Running Out of Food in the Last Year: Never true    • Ran Out of Food in the Last Year: Never true   Transportation Needs: No Transportation Needs (10/2/2023)    PRAPARE - Transportation    • Lack of Transportation (Medical): No    • Lack of Transportation (Non-Medical): No   Physical Activity: Inactive (5/24/2021)    Exercise Vital Sign    • Days of Exercise per Week: 0 days    • Minutes of Exercise per Session: 0 min   Stress: Not on file   Social Connections: Unknown (6/18/2024)    Received from [facility]    Social Connections    • How often do you feel lonely or isolated from those around you? (Adult - for ages 18 years and over): Not on file   Intimate Partner Violence: Not At Risk (5/24/2021)    Humiliation, Afraid, Rape, and Kick questionnaire    • Fear of Current or Ex-Partner: No    • Emotionally Abused: No    • Physically Abused: No    • Sexually Abused: No   Housing Stability: Low Risk  (7/20/2023)    Housing Stability Vital Sign    • Unable to Pay for Housing in the Last Year: No    • Number of Times Moved in the Last Year: 1    • Homeless in the  Last Year: No      Family History   Problem Relation Age of Onset   • Lung cancer Mother 60   • Brain cancer Mother 60   • Diabetes Father    • Depression Father    • Other Father         septic   • Allergies Sister    • Hashimoto's thyroiditis Sister    • Abdominal aortic aneurysm Sister    • Diabetes Sister    • No Known Problems Sister    • No Known Problems Maternal Grandmother    • No Known Problems Maternal Grandfather    • No Known Problems Paternal Grandmother    • No Known Problems Paternal Grandfather    • Hodgkin's lymphoma Brother    • No Known Problems Brother    • No Known Problems Maternal Aunt    • Diabetes Other    • Breast cancer Neg Hx      Past Surgical History:   Procedure Laterality Date   • COLONOSCOPY     • DILATION AND CURETTAGE OF UTERUS     • EGD     • EXPLORATORY LAPAROTOMY      for infertility   • EXPLORATORY LAPAROTOMY W/ BOWEL RESECTION N/A 7/19/2023    Procedure: LAPAROTOMY EXPLORATORY W/ BOWEL RESECTION;  Surgeon: Crista Recinos MD;  Location: [facility];  Service: General   • HAND SURGERY      Hand excision of tendon cyst   • MI LAPAROSCOPIC APPENDECTOMY N/A 05/03/2022    Procedure: APPENDECTOMY LAPAROSCOPIC;  Surgeon: Vin Fields MD;  Location: [facility];  Service: General   • MI LAPS ABD PRTM&OMENTUM DX W/WO SPEC BR/WA SPX N/A 7/19/2023    Procedure: LAPAROSCOPY DIAGNOSTIC, LYSIS OF ADHESIONS, LAPAROSCOPY TAKE TAKEDOWN OF LEFT COLON, EXPLORATORY LAPAROSCOPY, LYSIS OF ADHESIONS, RESECTION OF TRANSVERSE COLON SPLENIC FLEXURE AND DESCENDING COLON , TAKE DOWN OF SPLENIC FLEXURE, SIGMOIDOSCOPY, MOBILIZATION OF RIGHT COLON, PRIMARY ANASTOMOSIS EEA 29, TAP BLOCK;  Surgeon: Crista Recinos MD;  Location[facility];  Service: General   • MI TCAT IV STENT CRV CRTD ART EMBOLIC PROTECJ Left 4/29/2024    Procedure: ANGIOPLASTY ARTERY CAROTID W/ STENT;  Surgeon: Roberto García DO;  Location: [facility];  Service: Vascular   • MI TEAEC W/PATCH GRF CAROTID VERTB SUBCLAV NECK INC Left  1/31/2023    Procedure: EXPLORATION OF LEFT CAROTID ARTERY; ABORTED ENDARTERECTOMY ARTERY CAROTID;  Surgeon: Roberto García DO;  Location: [facility];  Service: Vascular   • SUPERIOR MESTENTERIC ARTERY STENT     • WISDOM TOOTH EXTRACTION         Current Outpatient Medications:   •  acetaminophen (TYLENOL) 325 mg tablet, Take 2 tablets (650 mg total) by mouth every 6 (six) hours, Disp: , Rfl: 0  •  amiodarone 100 mg tablet, Take 1 tablet (100 mg total) by mouth daily, Disp: 90 tablet, Rfl: 3  •  apixaban (Eliquis) 5 mg, TAKE ONE TABLET BY MOUTH TWICE DAILY, Disp: 180 tablet, Rfl: 1  •  aspirin (ECOTRIN LOW STRENGTH) 81 mg EC tablet, Take 81 mg by mouth daily, Disp: , Rfl:   •  chlordiazepoxide-clidinium (LIBRAX) 5-2.5 mg per capsule, Take 1 capsule by mouth daily as needed for cramping, Disp: 90 capsule, Rfl: 0  •  Diclofenac Sodium (VOLTAREN) 1 %, Apply 2 g topically 4 (four) times a day, Disp: 100 g, Rfl: 2  •  diltiazem (CARDIZEM CD) 120 mg 24 hr capsule, TAKE ONE CAPSULE BY MOUTH ONCE DAILY, Disp: 90 capsule, Rfl: 1  •  Docusate Sodium (COLACE PO), Take by mouth daily at bedtime, Disp: , Rfl:   •  Evolocumab (Repatha SureClick) 140 MG/ML SOAJ, Inject 1 mL (140 mg total) under the skin every 14 (fourteen) days, Disp: 2 mL, Rfl: 11  •  fexofenadine (ALLEGRA) 180 MG tablet, Take 180 mg by mouth daily, Disp: , Rfl:   •  fluticasone-vilanterol (BREO ELLIPTA) 200-25 MCG/INH inhaler, Inhale 1 puff daily Rinse mouth after use., Disp: 3 Inhaler, Rfl: 3  •  furosemide (LASIX) 40 mg tablet, Take 1 tablet (40 mg total) by mouth daily, Disp: 90 tablet, Rfl: 0  •  LORazepam (ATIVAN) 2 mg tablet, Take 1 tablet (2 mg total) by mouth every 8 (eight) hours as needed for anxiety, Disp: 90 tablet, Rfl: 0  •  methocarbamol (ROBAXIN) 500 mg tablet, Take 2 tablets (1,000 mg total) by mouth 3 (three) times a day, Disp: 180 tablet, Rfl: 5  •  metoprolol succinate (TOPROL-XL) 100 mg 24 hr tablet, Take 1 tablet (100 mg total) by mouth  2 (two) times a day, Disp: 180 tablet, Rfl: 1  •  omeprazole (PriLOSEC) 40 MG capsule, Take 1 capsule (40 mg total) by mouth 2 (two) times a day before meals, Disp: 60 capsule, Rfl: 5  •  ondansetron (ZOFRAN-ODT) 8 mg disintegrating tablet, Take 1 tablet (8 mg total) by mouth every 12 (twelve) hours as needed for nausea or vomiting, Disp: 30 tablet, Rfl: 2  •  phenazopyridine (PYRIDIUM) 200 mg tablet, Take 1 tablet (200 mg total) by mouth 2 (two) times a day as needed for bladder spasms, Disp: 30 tablet, Rfl: 5  •  polyethylene glycol (MIRALAX) 17 g packet, Take 17 g by mouth daily as needed (constipation), Disp: , Rfl:   •  promethazine (PHENERGAN) 25 mg tablet, Take 1 tablet (25 mg total) by mouth every 6 (six) hours as needed for nausea or vomiting, Disp: 60 tablet, Rfl: 5  •  senna (SENOKOT) 8.6 mg, Take 1 tablet (8.6 mg total) by mouth daily as needed for constipation, Disp: , Rfl:   •  spironolactone (ALDACTONE) 25 mg tablet, TAKE ONE TABLET BY MOUTH TWICE DAILY, Disp: 180 tablet, Rfl: 1  •  traMADol (ULTRAM) 50 mg tablet, Take 2 tablets (100 mg total) by mouth every 8 (eight) hours as needed for moderate pain, Disp: 180 tablet, Rfl: 0  •  triamcinolone (KENALOG) 0.1 % cream, Apply topically 2 (two) times a day, Disp: 15 g, Rfl: 2  •  clopidogrel (Plavix) 75 mg tablet, Take 1 tablet (75 mg total) by mouth daily, Disp: 30 tablet, Rfl: 3  •  LORazepam (ATIVAN) 1 mg tablet, Take 1 tablet (1 mg total) by mouth 3 (three) times a day (Patient not taking: Reported on 7/18/2024), Disp: 90 tablet, Rfl: 0  •  magnesium Oxide (MAG-OX) 400 mg TABS, Take 1 tablet (400 mg total) by mouth daily at bedtime (Patient not taking: Reported on 4/10/2024), Disp: 90 tablet, Rfl: 3    Current Facility-Administered Medications:   •  cyanocobalamin injection 1,000 mcg, 1,000 mcg, Intramuscular, Q30 Days, Coy Ayoub DO, 1,000 mcg at 11/16/23 1151        Review of Systems:  Review of Systems   Constitutional:  Negative for activity  change, fever and unexpected weight change.   HENT:  Negative for facial swelling, nosebleeds and voice change.    Respiratory:  Negative for chest tightness, shortness of breath and wheezing.    Cardiovascular:  Negative for chest pain, palpitations and leg swelling.   Gastrointestinal:  Negative for abdominal distention.   Genitourinary:  Negative for hematuria.   Musculoskeletal:  Negative for arthralgias.   Skin:  Negative for color change, pallor, rash and wound.   Neurological:  Positive for headaches. Negative for dizziness, seizures and syncope.   Psychiatric/Behavioral:  Negative for agitation.          Physical Exam:  Physical Exam  Vitals reviewed.   Constitutional:       Appearance: She is well-developed. She is obese.   HENT:      Head: Normocephalic and atraumatic.   Cardiovascular:      Rate and Rhythm: Normal rate and regular rhythm.      Heart sounds: Normal heart sounds.   Pulmonary:      Effort: Pulmonary effort is normal.      Breath sounds: Normal breath sounds.   Abdominal:      Palpations: Abdomen is soft.   Musculoskeletal:         General: Normal range of motion.      Cervical back: Normal range of motion and neck supple.   Skin:     General: Skin is warm and dry.   Neurological:      Mental Status: She is alert and oriented to person, place, and time.   Psychiatric:         Behavior: Behavior normal.         Thought Content: Thought content normal.         Judgment: Judgment normal.         This note was completed in part utilizing "[employer]" Direct Software.  Grammatical errors, random word insertions, spelling mistakes, and incomplete sentences can be an occasional consequence of this system secondary to software limitations, ambient noise, and hardware issues.  If you have any questions or concerns about the content, text, or information contained within the body of this dictation, please contact the provider for clarification.

## 2024-07-19 NOTE — TELEPHONE ENCOUNTER
Codi from [facility] called to check status of the paperwork requested to be faxed. I advised per last message it was faxed back this morning

## 2024-07-19 NOTE — TELEPHONE ENCOUNTER
Contact was made with ms. Jones and she is now aware of Dr. Lynn's instructions. Patient verbally states that she understands and no questions or concerns at this time.

## 2024-07-22 NOTE — TELEPHONE ENCOUNTER
PA for repatha 140 mg.ml   Denied    Reason:(Screenshot if applicable)        Message sent to office clinical pool Yes    Denial letter scanned into Media Yes    Appeal started No ( Provider will need to decide if appeal is warranted and send clinical documentation to Prior Authorization Team for initiation.)    **Please follow up with your patient regarding denial and next steps**

## 2024-07-24 DIAGNOSIS — F41.9 ANXIETY: ICD-10-CM

## 2024-07-24 DIAGNOSIS — Z90.49 S/P SMALL BOWEL RESECTION: ICD-10-CM

## 2024-07-24 DIAGNOSIS — M54.16 LUMBAR RADICULOPATHY: ICD-10-CM

## 2024-07-25 ENCOUNTER — PATIENT OUTREACH (OUTPATIENT)
Dept: CASE MANAGEMENT | Facility: OTHER | Age: 64
End: 2024-07-25

## 2024-07-25 ENCOUNTER — OFFICE VISIT (OUTPATIENT)
Dept: GASTROENTEROLOGY | Facility: CLINIC | Age: 64
End: 2024-07-25
Payer: COMMERCIAL

## 2024-07-25 VITALS
OXYGEN SATURATION: 95 % | BODY MASS INDEX: 33.77 KG/M2 | DIASTOLIC BLOOD PRESSURE: 71 MMHG | HEIGHT: 64 IN | WEIGHT: 197.8 LBS | SYSTOLIC BLOOD PRESSURE: 116 MMHG | HEART RATE: 63 BPM | TEMPERATURE: 97.6 F

## 2024-07-25 DIAGNOSIS — K21.9 GASTROESOPHAGEAL REFLUX DISEASE, UNSPECIFIED WHETHER ESOPHAGITIS PRESENT: ICD-10-CM

## 2024-07-25 DIAGNOSIS — Z12.11 COLON CANCER SCREENING: ICD-10-CM

## 2024-07-25 DIAGNOSIS — R11.0 NAUSEA IN ADULT: ICD-10-CM

## 2024-07-25 DIAGNOSIS — K76.0 FATTY LIVER: Primary | ICD-10-CM

## 2024-07-25 PROCEDURE — 99214 OFFICE O/P EST MOD 30 MIN: CPT | Performed by: PHYSICIAN ASSISTANT

## 2024-07-25 RX ORDER — TRAMADOL HYDROCHLORIDE 50 MG/1
100 TABLET ORAL EVERY 8 HOURS PRN
Qty: 180 TABLET | Refills: 0 | Status: SHIPPED | OUTPATIENT
Start: 2024-07-25

## 2024-07-25 NOTE — PROGRESS NOTES
[facility] - Outpatient Follow-up Note  Nancy Jones 64 y.o. female MRN: [id number]  Encounter: [id number]      Assessment and Plan    1. GERD  2. Nausea   The patient has acid reflux which is currently well controlled on omeprazole 40mg twice daily although she does continue to have nausea and early satiety. EGD 5/28/19 revealed multiple superficial ulcers in the antrum with negative H pylori testing. Etiology thought to be NSAIDs. Repeat EGD 4/7/22 normal including esophogeal biopsies.   -Avoid NSAIDS   -Continue omeprazole 40mg twice daily  -Continue anti nausea meds, currently on phenergan  -Given continued symptoms of nausea and early satiety recommend GES however patient defers for now      3. Fatty liver  Seen on CT scan 11/29/21. U/S elastography with S3 steatosis but no fibrosis. Last CMP 7/18/23 normal aside for a mildly elevated alk phos of 118  -Routine monitoring of his CMP  -Recommend healthy diet and routine exercise    4. Colon cancer screening  Last colonoscopy 7/2022 with no polyps and a 10 year recall  -Repeat colonoscopy 7/2032 or sooner if clinically indicated     Follow up 6 months     ______________________________________________________________________    History of Present Illness  Nancy Jones is a 64 y.o. female with HTN, a fib, CAMILLE, CKD3, anxiety, ischemic colitis s/p resection, SMA thrombosis s/p stent, and history of peptic ulcer disease here for follow up evaluation of nausea, GERD, and abdominal pain.  The patient states that her GERD is currently well-controlled on omeprazole 40 mg once daily but she does continue to have bothersome nausea.  She is unable to eat a full meal secondary to early satiety and eating makes her abdominal pain worse.  She states that her pain is located near her hernias.  For her pain she underwent CT scan 4/16/2024 with 3 midline ventral abdominal wall incisional hernias.  She is following with general surgery.        Review  "of Systems   Constitutional:  Negative for activity change, appetite change, chills, fatigue, fever and unexpected weight change.   Gastrointestinal:  Positive for nausea.       Past Medical History  Past Medical History:   Diagnosis Date    A-fib (HCC) 03/2020    Allergic     Anxiety     Arthritis     Asthma     Back pain     and muscle pain    Bruises easily     Chronic pain disorder     Interstitial pain    Colon polyp     Dental bridge present     Depression     Eczema     GERD (gastroesophageal reflux disease)     Heart murmur     History of blood clots     per pt \"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\"    History of pneumonia     Hives     Hyperlipidemia     Hypertension     Infertility, female     Interstitial cystitis     Migraine     sees neurologist    Motion sickness     Obesity     Palpitations     PONV (postoperative nausea and vomiting)     Premature atrial contractions 11/09/2022    Psychiatric disorder     TIA (transient ischemic attack) 09/2022    per pt --\"had MRI and 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\"    Urinary incontinence     wears Pads    Venous insufficiency 08/01/2017    Wears glasses        Past Social history  Past Surgical History:   Procedure Laterality Date    COLONOSCOPY      DILATION AND CURETTAGE OF UTERUS      EGD      EXPLORATORY LAPAROTOMY      for infertility    EXPLORATORY LAPAROTOMY W/ BOWEL RESECTION N/A 7/19/2023    Procedure: LAPAROTOMY EXPLORATORY W/ BOWEL RESECTION;  Surgeon: Crista Recinos MD;  Location: [facility];  Service: General    HAND SURGERY      Hand excision of tendon cyst    ID LAPAROSCOPIC APPENDECTOMY N/A 05/03/2022    Procedure: APPENDECTOMY LAPAROSCOPIC;  Surgeon: Vin Fields MD;  Location: [facility];  Service: General    ID LAPS ABD PRTM&OMENTUM DX W/WO SPEC BR/WA SPX N/A 7/19/2023    Procedure: LAPAROSCOPY DIAGNOSTIC, LYSIS OF ADHESIONS, LAPAROSCOPY TAKE TAKEDOWN OF LEFT COLON, EXPLORATORY LAPAROSCOPY, LYSIS OF ADHESIONS, " RESECTION OF TRANSVERSE COLON SPLENIC FLEXURE AND DESCENDING COLON , TAKE DOWN OF SPLENIC FLEXURE, SIGMOIDOSCOPY, MOBILIZATION OF RIGHT COLON, PRIMARY ANASTOMOSIS EEA 29, TAP BLOCK;  Surgeon: Crista Recinos MD;  Location[facility];  Service: General    WI TCAT IV STENT CRV CRTD ART EMBOLIC PROTECJ Left 2024    Procedure: ANGIOPLASTY ARTERY CAROTID W/ STENT;  Surgeon: Roberto García DO;  Location[facility];  Service: Vascular    WI TEAEC W/PATCH GRF CAROTID VERTB SUBCLAV NECK INC Left 2023    Procedure: EXPLORATION OF LEFT CAROTID ARTERY; ABORTED ENDARTERECTOMY ARTERY CAROTID;  Surgeon: Roberto García DO;  Location: [facility];  Service: Vascular    SUPERIOR MESTENTERIC ARTERY STENT      WISDOM TOOTH EXTRACTION       Social History     Socioeconomic History    Marital status:      Spouse name: Not on file    Number of children: 0    Years of education: Not on file    Highest education level: Not on file   Occupational History    Occupation: retired   Tobacco Use    Smoking status: Former     Current packs/day: 0.00     Average packs/day: 0.5 packs/day for 10.0 years (5.0 ttl pk-yrs)     Types: Cigarettes     Start date:      Quit date: 2019     Years since quittin.5     Passive exposure: Past    Smokeless tobacco: Never   Vaping Use    Vaping status: Never Used   Substance and Sexual Activity    Alcohol use: Not Currently    Drug use: No    Sexual activity: Not Currently     Comment: defer   Other Topics Concern    Not on file   Social History Narrative    Who lives in your home: Alone     What type of home do you live in: Apartment     Age of your home: 1952 built     How long have you been living there: 5 yrs     Type of heat: forced hot air     Type of fuel: electric     What type of jamin is in your bedroom: carpet jamin     Do you have the following in or near your home:    Air products: Humidifier in the bedroom/kitchen     Pests: none     Pets: none     Are pets  allowed in bedroom: N/A     Open fields, wooded areas nearby: No     Basement: [id number]-no mold     Exposure to second hand smoke: No    Central air     Habits:    Caffeine: Hot tea 1 cup daily- ice tea 1 cup in the afternoon    Chocolate: Occasionally      Social Determinants of Health     Financial Resource Strain: Medium Risk (10/2/2023)    Overall Financial Resource Strain (CARDIA)     Difficulty of Paying Living Expenses: Somewhat hard   Food Insecurity: No Food Insecurity (2/21/2023)    Hunger Vital Sign     Worried About Running Out of Food in the Last Year: Never true     Ran Out of Food in the Last Year: Never true   Transportation Needs: No Transportation Needs (10/2/2023)    PRAPARE - Transportation     Lack of Transportation (Medical): No     Lack of Transportation (Non-Medical): No   Physical Activity: Inactive (5/24/2021)    Exercise Vital Sign     Days of Exercise per Week: 0 days     Minutes of Exercise per Session: 0 min   Stress: Not on file   Social Connections: Unknown (6/18/2024)    Received from [employer]    [employer]     How often do you feel lonely or isolated from those around you? (Adult - for ages 18 years and over): Not on file   Intimate Partner Violence: Not At Risk (5/24/2021)    Humiliation, Afraid, Rape, and Kick questionnaire     Fear of Current or Ex-Partner: No     Emotionally Abused: No     Physically Abused: No     Sexually Abused: No   Housing Stability: Low Risk  (7/20/2023)    Housing Stability Vital Sign     Unable to Pay for Housing in the Last Year: No     Number of Times Moved in the Last Year: 1     Homeless in the Last Year: No     Social History     Substance and Sexual Activity   Alcohol Use Not Currently     Social History     Substance and Sexual Activity   Drug Use No     Social History     Tobacco Use   Smoking Status Former    Current packs/day: 0.00    Average packs/day: 0.5 packs/day for 10.0 years (5.0 ttl pk-yrs)    Types: Cigarettes     Start date:     Quit date:     Years since quittin.5    Passive exposure: Past   Smokeless Tobacco Never       Past Family History  Family History   Problem Relation Age of Onset    Lung cancer Mother 60    Brain cancer Mother 60    Diabetes Father     Depression Father     Other Father         septic    Allergies Sister     Hashimoto's thyroiditis Sister     Abdominal aortic aneurysm Sister     Diabetes Sister     No Known Problems Sister     No Known Problems Maternal Grandmother     No Known Problems Maternal Grandfather     No Known Problems Paternal Grandmother     No Known Problems Paternal Grandfather     Hodgkin's lymphoma Brother     No Known Problems Brother     No Known Problems Maternal Aunt     Diabetes Other     Breast cancer Neg Hx        Current Medications  Current Outpatient Medications   Medication Sig Dispense Refill    acetaminophen (TYLENOL) 325 mg tablet Take 2 tablets (650 mg total) by mouth every 6 (six) hours  0    amiodarone 100 mg tablet Take 1 tablet (100 mg total) by mouth daily 90 tablet 3    apixaban (Eliquis) 5 mg TAKE ONE TABLET BY MOUTH TWICE DAILY 180 tablet 1    aspirin (ECOTRIN LOW STRENGTH) 81 mg EC tablet Take 81 mg by mouth daily      chlordiazepoxide-clidinium (LIBRAX) 5-2.5 mg per capsule Take 1 capsule by mouth daily as needed for cramping 90 capsule 0    clopidogrel (Plavix) 75 mg tablet Take 1 tablet (75 mg total) by mouth daily 30 tablet 3    Diclofenac Sodium (VOLTAREN) 1 % Apply 2 g topically 4 (four) times a day 100 g 2    diltiazem (CARDIZEM CD) 120 mg 24 hr capsule TAKE ONE CAPSULE BY MOUTH ONCE DAILY 90 capsule 1    Docusate Sodium (COLACE PO) Take by mouth daily at bedtime      Evolocumab (Repatha SureClick) 140 MG/ML SOAJ Inject 1 mL (140 mg total) under the skin every 14 (fourteen) days 2 mL 11    fexofenadine (ALLEGRA) 180 MG tablet Take 180 mg by mouth daily      fluticasone-vilanterol (BREO ELLIPTA) 200-25 MCG/INH inhaler Inhale 1 puff daily  Rinse mouth after use. 3 Inhaler 3    furosemide (LASIX) 40 mg tablet Take 1 tablet (40 mg total) by mouth daily 90 tablet 0    LORazepam (ATIVAN) 1 mg tablet Take 1 tablet (1 mg total) by mouth 3 (three) times a day 90 tablet 0    LORazepam (ATIVAN) 2 mg tablet Take 1 tablet (2 mg total) by mouth every 8 (eight) hours as needed for anxiety 90 tablet 0    magnesium Oxide (MAG-OX) 400 mg TABS Take 1 tablet (400 mg total) by mouth daily at bedtime 90 tablet 3    methocarbamol (ROBAXIN) 500 mg tablet Take 2 tablets (1,000 mg total) by mouth 3 (three) times a day 180 tablet 5    metoprolol succinate (TOPROL-XL) 100 mg 24 hr tablet Take 1 tablet (100 mg total) by mouth 2 (two) times a day 180 tablet 1    omeprazole (PriLOSEC) 40 MG capsule Take 1 capsule (40 mg total) by mouth 2 (two) times a day before meals 60 capsule 5    ondansetron (ZOFRAN-ODT) 8 mg disintegrating tablet Take 1 tablet (8 mg total) by mouth every 12 (twelve) hours as needed for nausea or vomiting 30 tablet 2    phenazopyridine (PYRIDIUM) 200 mg tablet Take 1 tablet (200 mg total) by mouth 2 (two) times a day as needed for bladder spasms 30 tablet 5    polyethylene glycol (MIRALAX) 17 g packet Take 17 g by mouth daily as needed (constipation)      promethazine (PHENERGAN) 25 mg tablet Take 1 tablet (25 mg total) by mouth every 6 (six) hours as needed for nausea or vomiting 60 tablet 5    senna (SENOKOT) 8.6 mg Take 1 tablet (8.6 mg total) by mouth daily as needed for constipation      spironolactone (ALDACTONE) 25 mg tablet TAKE ONE TABLET BY MOUTH TWICE DAILY 180 tablet 1    traMADol (ULTRAM) 50 mg tablet Take 2 tablets (100 mg total) by mouth every 8 (eight) hours as needed for moderate pain 180 tablet 0    triamcinolone (KENALOG) 0.1 % cream Apply topically 2 (two) times a day 15 g 2     Current Facility-Administered Medications   Medication Dose Route Frequency Provider Last Rate Last Admin    cyanocobalamin injection 1,000 mcg  1,000 mcg  "Intramuscular Q30 Days Coy Ayoub DO   1,000 mcg at 11/16/23 1151       Allergies  Allergies   Allergen Reactions    Ace Inhibitors Angioedema and Anaphylaxis     Anaphylaxis    Benicar [Olmesartan] Angioedema     See Allergy note from 9/11/2008.  Swollen ankles/legs    Hydralazine Other (See Comments)     Lip swelling  Flank pain    Other Anaphylaxis and Other (See Comments)     Preservatives- itching throat closes, hi  E Z Cat scan contrast - hives throat closes itching,  Preservatives- itching throat closes, hi  Artificial sweeteners    Valsartan Angioedema     Lips, face swollen    Ampicillin Hives     Depends on brand some preservatives can react. Has recently tolerated oral amoxicillin.    Sulfa Antibiotics Hives     stuffiness,itching,hives,throat closing--per pt \"sometimes able to take depending on the brand and the filler or possibly preservatives in it\"    Motrin [Ibuprofen] Other (See Comments)     Per pt \" told not to take due to A Fib\"    Wound Dressing Adhesive Other (See Comments)     Per pt Adhesive on EKG leads caused redness         The following portions of the patient's history were reviewed and updated as appropriate: allergies, current medications, past medical history, past social history, past surgical history and problem list.      Vitals  Vitals:    07/25/24 1153   BP: 116/71   BP Location: Left arm   Patient Position: Sitting   Cuff Size: Standard   Pulse: 63   Temp: 97.6 °F (36.4 °C)   TempSrc: Tympanic   SpO2: 95%   Weight: 89.7 kg (197 lb 12.8 oz)   Height: 5' 4\" (1.626 m)         Physical Exam  Constitutional   General appearance: Patient is seated and in no acute distress, well appearing and well nourished.   Head and Face   Head and face: Normal.    Eyes   Conjunctiva and lids: No erythema, swelling or discharge.  Anicteric.  Ears, Nose, Mouth, and Throat   Hearing: Normal.    Neck: Supple, trachea midline.  Pulmonary   Respiratory effort: No increased work of breathing or signs " of respiratory distress.    Cardiovascular   Examination of extremities for edema and/or varicosities: Normal.    Musculoskeletal   Gait and station: Normal    Skin   Skin and subcutaneous tissue: Warm, dry, and intact. No visible jaundice, lesions or rashes.  Psychiatric   Judgment and insight: Normal  Recent and remote memory:  Normal  Mood and affect: Normal      Results  No visits with results within 1 Day(s) from this visit.   Latest known visit with results is:   Lab on 06/30/2024   Component Date Value    Urine Culture 06/30/2024 >100,000 cfu/ml Escherichia coli (A)     Urine Culture 06/30/2024 <10,000 cfu/ml Proteus species (A)     Color, UA 06/30/2024 Light [place]     Clarity, UA 06/30/2024 Extra Turbid     Specific Gravity, UA 06/30/2024 1.011     pH, UA 06/30/2024 5.5     Leukocytes, UA 06/30/2024 Large (A)     Nitrite, UA 06/30/2024 Negative     Protein, UA 06/30/2024 70 (1+) (A)     Glucose, UA 06/30/2024 Negative     Ketones, UA 06/30/2024 Negative     Urobilinogen, UA 06/30/2024 <2.0     Bilirubin, UA 06/30/2024 Negative     Occult Blood, UA 06/30/2024 Large (A)     RBC, UA 06/30/2024 Innumerable (A)     WBC, UA 06/30/2024 Innumerable (A)     Epithelial Cells 06/30/2024 Occasional     Bacteria, UA 06/30/2024 Innumerable (A)     Hyaline Casts, UA 06/30/2024 10-25 (A)     WBC Clumps 06/30/2024 Present        Radiology Results  No results found.    Orders  No orders of the defined types were placed in this encounter.

## 2024-07-25 NOTE — LETTER
Date: 07/25/24    Dear Nancy Jones,   My name is Candace; I am a registered nurse care manager working with   [facility]    I have not been able to reach you and would like to set a time that I can talk with you over the phone.  My work is to help patients that have complex medical conditions get the care they need. This includes patients who may have been in the hospital or emergency room.    I have enclosed my contact information below for you. Please call me with any questions you may have. I look forward to hearing from you.  Sincerely,  Candace Rivers RN  [phone]  Outpatient Care Manager

## 2024-07-30 ENCOUNTER — NURSE TRIAGE (OUTPATIENT)
Age: 64
End: 2024-07-30

## 2024-07-30 ENCOUNTER — PROCEDURE VISIT (OUTPATIENT)
Dept: UROLOGY | Facility: CLINIC | Age: 64
End: 2024-07-30
Payer: COMMERCIAL

## 2024-07-30 VITALS
SYSTOLIC BLOOD PRESSURE: 162 MMHG | WEIGHT: 195.8 LBS | HEIGHT: 64 IN | BODY MASS INDEX: 33.43 KG/M2 | HEART RATE: 92 BPM | OXYGEN SATURATION: 93 % | DIASTOLIC BLOOD PRESSURE: 86 MMHG

## 2024-07-30 DIAGNOSIS — N30.10 INTERSTITIAL CYSTITIS: ICD-10-CM

## 2024-07-30 DIAGNOSIS — N39.0 RECURRENT UTI: Primary | ICD-10-CM

## 2024-07-30 DIAGNOSIS — R39.82 CHRONIC BLADDER PAIN: ICD-10-CM

## 2024-07-30 LAB
SL AMB  POCT GLUCOSE, UA: NEGATIVE
SL AMB LEUKOCYTE ESTERASE,UA: NORMAL
SL AMB POCT BILIRUBIN,UA: NEGATIVE
SL AMB POCT BLOOD,UA: NEGATIVE
SL AMB POCT CLARITY,UA: CLEAR
SL AMB POCT COLOR,UA: NORMAL
SL AMB POCT KETONES,UA: NEGATIVE
SL AMB POCT NITRITE,UA: POSITIVE
SL AMB POCT PH,UA: 6
SL AMB POCT SPECIFIC GRAVITY,UA: 1.01
SL AMB POCT URINE PROTEIN: NEGATIVE
SL AMB POCT UROBILINOGEN: 0.2

## 2024-07-30 PROCEDURE — 81002 URINALYSIS NONAUTO W/O SCOPE: CPT | Performed by: UROLOGY

## 2024-07-30 PROCEDURE — 99214 OFFICE O/P EST MOD 30 MIN: CPT | Performed by: UROLOGY

## 2024-07-30 PROCEDURE — 52000 CYSTOURETHROSCOPY: CPT | Performed by: UROLOGY

## 2024-07-30 PROCEDURE — 1159F MED LIST DOCD IN RCRD: CPT | Performed by: UROLOGY

## 2024-07-30 PROCEDURE — 87086 URINE CULTURE/COLONY COUNT: CPT | Performed by: UROLOGY

## 2024-07-30 PROCEDURE — 1160F RVW MEDS BY RX/DR IN RCRD: CPT | Performed by: UROLOGY

## 2024-07-30 RX ORDER — CEPHALEXIN 250 MG/1
250 CAPSULE ORAL DAILY
Qty: 90 CAPSULE | Refills: 1 | Status: SHIPPED | OUTPATIENT
Start: 2024-07-30 | End: 2024-10-28

## 2024-07-30 RX ORDER — CEPHALEXIN 500 MG/1
500 CAPSULE ORAL EVERY 6 HOURS SCHEDULED
Qty: 28 CAPSULE | Refills: 0 | Status: SHIPPED | OUTPATIENT
Start: 2024-07-30 | End: 2024-08-06

## 2024-07-30 RX ORDER — CEPHALEXIN 500 MG/1
500 CAPSULE ORAL ONCE
Qty: 1 CAPSULE | Refills: 0 | Status: SHIPPED | OUTPATIENT
Start: 2024-07-30 | End: 2024-07-30

## 2024-07-30 NOTE — LETTER
2024     Coy Ayoub DO  [address] [facility] [address]    Patient: Nancy Jones   YOB: 1960   Date of Visit: 2024       Dear Dr. Ayoub:    Thank you for referring Nancy Jones to me for evaluation. Below are my notes for this consultation.    If you have questions, please do not hesitate to call me. I look forward to following your patient along with you.         Sincerely,        Alexis Hobson MD        CC: No Recipients    Alexis Hobson MD  2024 10:35 AM  Incomplete  UROLOGY PROGRESS NOTE   [facility]  [address] [place] [address]  [address]  [place] [address]  [phone]  Fax:[phone]  [email or web address]      NAME: Nancy Jones  AGE: 64 y.o. SEX: female  : 1960   MRN: [id number]    DATE: 2024  TIME: 8:19 AM    Assessment and Plan:    Recurrent UTI, symptomatic super imposed on interstitial cystitis-as below, she is taken the Pyridium which I have said before not the best thing but it is the only thing that works for her.  Her bladder is essentially normal, it is large capacity but there are no tumors.  Vaginal exam and cervical os look normal.  For the clot she is passing I recommend that she see gynecology for further studies of this.  From my standpoint, we will give her a full course of Keflex today, check a urine culture and give her Keflex 250 mg daily for 6 months to help prevent any recurrent UTIs to take that off the table in terms of her symptoms.  Follow-up in 6 months or as needed.               Chief Complaint   No chief complaint on file.      History of Present Illness   Follow-up chronic interstitial cystitis and chronic bladder pain last seen by me 2024.  At that time I tried to switch her to Uribel as a trial but I felt she may need to continue with Pyridium indefinitely and although I was not crazy by keeping her on that for long-term it seem to be the only thing that works.  I also referred her back to  pelvic floor therapy.  She had a positive urine culture in March but there is significant antibiotic resistance.  However urine culture June 30, 2024 showed greater than 100,000 colonies of E. coli and less than 10,000 colonies of Proteus.  This was treated with Keflex.  Because of the recurrent UTIs she is here for cystoscopy.  She had painful burning in the bladder and urethra with and without urination with the infection.  She could feel her bladder filling up and then deflate with urination with pain.  She has been passing some blood clots when she urinates.       Cystoscopy     Date/Time  7/30/2024 10:00 AM     Performed by  Alexis Hobson MD   Authorized by  Alexis Hobson MD     Universal Protocol:  Consent: Verbal consent obtained. Written consent obtained.      Procedure Details:  Procedure type: cystoscopy    Additional Procedure Details: Cystoscopy Procedure Note        Pre-operative Diagnosis: Recurrent UTI, chronic interstitial cystitis with bladder pain    Post-operative Diagnosis: Same, normal bladder and vagina    Procedure: Flexible cystoscopy    Surgeon: Alexis Hobson MD    Anesthesia: 1% Xylocaine per urethra    EBL: Minimal    Complications: none    Procedure Details   The risks, benefits, complications, treatment options, and expected outcomes were discussed with the patient. The patient concurred with the proposed plan, giving informed consent.    Cystoscopy was performed today under local anesthesia, using sterile technique. The patient was placed in the supine position, prepped with Betadine, and draped in the usual sterile fashion. The flexible cystocope was used to inspect both the urethra and bladder    Findings:  Urethra: Normal without stricture.  No caruncle.    Bladder:  Smooth, not trabeculated and there were no stones tumors or other lesions.  The orifices were orthotopic and intact.  Inspection of the vagina including the cervix shows no lesions, no blood coming from the os of the  "cervix.  Normal urethra with no caruncle.           Specimens: none                 Complications:  None           Disposition: To home            Condition:  Stable              The following portions of the patient's history were reviewed and updated as appropriate: allergies, current medications, past family history, past medical history, past social history, past surgical history and problem list.  Past Medical History:   Diagnosis Date   • A-fib ([facility]) 03/2020   • Allergic    • Anxiety    • Arthritis    • Asthma    • Back pain     and muscle pain   • Bruises easily    • Chronic pain disorder     Interstitial pain   • Colon polyp    • Dental bridge present    • Depression    • Eczema    • GERD (gastroesophageal reflux disease)    • Heart murmur    • History of blood clots     per pt \"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\"   • History of pneumonia    • Hives    • Hyperlipidemia    • Hypertension    • Infertility, female    • Interstitial cystitis    • Migraine     sees neurologist   • Motion sickness    • Obesity    • Palpitations    • PONV (postoperative nausea and vomiting)    • Premature atrial contractions 11/09/2022   • Psychiatric disorder    • TIA (transient ischemic attack) 09/2022    per pt --\"had MRI and 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\"   • Urinary incontinence     wears Pads   • Venous insufficiency 08/01/2017   • Wears glasses      Past Surgical History:   Procedure Laterality Date   • COLONOSCOPY     • DILATION AND CURETTAGE OF UTERUS     • EGD     • EXPLORATORY LAPAROTOMY      for infertility   • EXPLORATORY LAPAROTOMY W/ BOWEL RESECTION N/A 7/19/2023    Procedure: LAPAROTOMY EXPLORATORY W/ BOWEL RESECTION;  Surgeon: Crista Recinos MD;  [facility];  Service: General   • HAND SURGERY      Hand excision of tendon cyst   • KS LAPAROSCOPIC APPENDECTOMY N/A 05/03/2022    Procedure: APPENDECTOMY LAPAROSCOPIC;  Surgeon: Vin Fields MD;  Location:  MAIN OR;  Service: " General   • AZ LAPS ABD PRTM&OMENTUM DX W/WO SPEC BR/WA SPX N/A 7/19/2023    Procedure: LAPAROSCOPY DIAGNOSTIC, LYSIS OF ADHESIONS, LAPAROSCOPY TAKE TAKEDOWN OF LEFT COLON, EXPLORATORY LAPAROSCOPY, LYSIS OF ADHESIONS, RESECTION OF TRANSVERSE COLON SPLENIC FLEXURE AND DESCENDING COLON , TAKE DOWN OF SPLENIC FLEXURE, SIGMOIDOSCOPY, MOBILIZATION OF RIGHT COLON, PRIMARY ANASTOMOSIS EEA 29, TAP BLOCK;  Surgeon: Crista Recinos MD;  [facility];  Service: General   • AZ TCAT IV STENT CRV CRTD ART EMBOLIC PROTECJ Left 4/29/2024    Procedure: ANGIOPLASTY ARTERY CAROTID W/ STENT;  Surgeon: Roberto García DO;  [facility];  Service: Vascular   • AZ TEAEC W/PATCH GRF CAROTID VERTB SUBCLAV NECK INC Left 1/31/2023    Procedure: EXPLORATION OF LEFT CAROTID ARTERY; ABORTED ENDARTERECTOMY ARTERY CAROTID;  Surgeon: Roberto García DO;  [facility];  Service: Vascular   • SUPERIOR MESTENTERIC ARTERY STENT     • WISDOM TOOTH EXTRACTION       shoulder  Review of Systems   Review of Systems    Active Problem List     Patient Active Problem List   Diagnosis   • Angina pectoris (HCC)   • Essential hypertension   • Migraines   • AMD (age-related macular degeneration), bilateral   • Allergic reaction   • Gastroesophageal reflux disease without esophagitis   • Angio-edema   • A-fib ([facility])   • Lumbar radiculopathy   • CAMILLE (obstructive sleep apnea)   • Hypertensive heart disease with congestive heart failure ([facility])   • Unspecified diastolic (congestive) heart failure ([facility])   • 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[facility])   • COPD (chronic obstructive pulmonary disease) ([facility])   • Urinary retention   • Peripheral vascular disease ([facility])   • Appendix disease   • Carotid artery stenosis   • Stenosis of left carotid artery   • Asymptomatic stenosis of left carotid artery   • Hepatic steatosis   • Partial facial nerve palsy   • Injury of left facial nerve   • Paresthesia   • Colitis   • Chronic migraine w/o aura w/o status  migrainosus, not intractable   • IIH (idiopathic intracranial hypertension)   • Hematochezia   • Left carotid stenosis   • Colonic ischemia ([facility])   • Acute postoperative pain   • S/P small bowel resection   • Anxiety   • Omental infarction (HCC)   • Hypokalemia   • Night sweats   • Dysuria   • Incisional hernia, without obstruction or gangrene   • Stage 3 chronic kidney disease, unspecified whether stage 3a or 3b CKD (HCC)   • Ventral hernia without obstruction or gangrene   • Atherosclerosis of native arteries of extremities with rest pain, bilateral legs (HCC)   • Acute on chronic diastolic (congestive) heart failure (HCC)   • Left facial numbness   • Other chest pain   • Acute pain of left knee   • Acute lateral meniscus tear of left knee   • Continuous opioid dependence ([facility])   • Obesity, morbid ([facility])   • Complex tear of lateral meniscus of left knee as current injury, initial encounter       Objective   LMP  (LMP Unknown)     Physical Exam  Vitals reviewed.   Constitutional:       Appearance: Normal appearance.   HENT:      Head: Normocephalic and atraumatic.   Eyes:      Extraocular Movements: Extraocular movements intact.   Pulmonary:      Effort: Pulmonary effort is normal.   Genitourinary:     General: Normal vulva.      Vagina: No vaginal discharge.      Comments: Normal vagina.  Musculoskeletal:         General: Normal range of motion.      Cervical back: Normal range of motion.   Skin:     Coloration: Skin is not jaundiced or pale.   Neurological:      General: No focal deficit present.      Mental Status: She is alert and oriented to person, place, and time. Mental status is at baseline.   Psychiatric:         Mood and Affect: Mood normal.         Behavior: Behavior normal.         Thought Content: Thought content normal.         Judgment: Judgment normal.             Current Medications     Current Outpatient Medications:   •  acetaminophen (TYLENOL) 325 mg tablet, Take 2 tablets (650 mg total) by  mouth every 6 (six) hours, Disp: , Rfl: 0  •  amiodarone 100 mg tablet, Take 1 tablet (100 mg total) by mouth daily, Disp: 90 tablet, Rfl: 3  •  apixaban (Eliquis) 5 mg, TAKE ONE TABLET BY MOUTH TWICE DAILY, Disp: 180 tablet, Rfl: 1  •  aspirin (ECOTRIN LOW STRENGTH) 81 mg EC tablet, Take 81 mg by mouth daily, Disp: , Rfl:   •  chlordiazepoxide-clidinium (LIBRAX) 5-2.5 mg per capsule, Take 1 capsule by mouth daily as needed for cramping, Disp: 90 capsule, Rfl: 0  •  clopidogrel (Plavix) 75 mg tablet, Take 1 tablet (75 mg total) by mouth daily, Disp: 30 tablet, Rfl: 3  •  Diclofenac Sodium (VOLTAREN) 1 %, Apply 2 g topically 4 (four) times a day, Disp: 100 g, Rfl: 2  •  diltiazem (CARDIZEM CD) 120 mg 24 hr capsule, TAKE ONE CAPSULE BY MOUTH ONCE DAILY, Disp: 90 capsule, Rfl: 1  •  Docusate Sodium (COLACE PO), Take by mouth daily at bedtime, Disp: , Rfl:   •  Evolocumab (Repatha SureClick) 140 MG/ML SOAJ, Inject 1 mL (140 mg total) under the skin every 14 (fourteen) days, Disp: 2 mL, Rfl: 11  •  fexofenadine (ALLEGRA) 180 MG tablet, Take 180 mg by mouth daily, Disp: , Rfl:   •  fluticasone-vilanterol (BREO ELLIPTA) 200-25 MCG/INH inhaler, Inhale 1 puff daily Rinse mouth after use., Disp: 3 Inhaler, Rfl: 3  •  furosemide (LASIX) 40 mg tablet, Take 1 tablet (40 mg total) by mouth daily, Disp: 90 tablet, Rfl: 0  •  LORazepam (ATIVAN) 1 mg tablet, Take 1 tablet (1 mg total) by mouth 3 (three) times a day, Disp: 90 tablet, Rfl: 0  •  LORazepam (ATIVAN) 2 mg tablet, Take 1 tablet (2 mg total) by mouth every 8 (eight) hours as needed for anxiety, Disp: 90 tablet, Rfl: 0  •  magnesium Oxide (MAG-OX) 400 mg TABS, Take 1 tablet (400 mg total) by mouth daily at bedtime, Disp: 90 tablet, Rfl: 3  •  methocarbamol (ROBAXIN) 500 mg tablet, Take 2 tablets (1,000 mg total) by mouth 3 (three) times a day, Disp: 180 tablet, Rfl: 5  •  metoprolol succinate (TOPROL-XL) 100 mg 24 hr tablet, Take 1 tablet (100 mg total) by mouth 2 (two)  times a day, Disp: 180 tablet, Rfl: 1  •  omeprazole (PriLOSEC) 40 MG capsule, Take 1 capsule (40 mg total) by mouth 2 (two) times a day before meals, Disp: 60 capsule, Rfl: 5  •  phenazopyridine (PYRIDIUM) 200 mg tablet, Take 1 tablet (200 mg total) by mouth 2 (two) times a day as needed for bladder spasms, Disp: 30 tablet, Rfl: 5  •  polyethylene glycol (MIRALAX) 17 g packet, Take 17 g by mouth daily as needed (constipation), Disp: , Rfl:   •  promethazine (PHENERGAN) 25 mg tablet, Take 1 tablet (25 mg total) by mouth every 6 (six) hours as needed for nausea or vomiting, Disp: 60 tablet, Rfl: 5  •  senna (SENOKOT) 8.6 mg, Take 1 tablet (8.6 mg total) by mouth daily as needed for constipation, Disp: , Rfl:   •  spironolactone (ALDACTONE) 25 mg tablet, TAKE ONE TABLET BY MOUTH TWICE DAILY, Disp: 180 tablet, Rfl: 1  •  traMADol (ULTRAM) 50 mg tablet, Take 2 tablets (100 mg total) by mouth every 8 (eight) hours as needed for moderate pain, Disp: 180 tablet, Rfl: 0  •  triamcinolone (KENALOG) 0.1 % cream, Apply topically 2 (two) times a day, Disp: 15 g, Rfl: 2    Current Facility-Administered Medications:   •  cyanocobalamin injection 1,000 mcg, 1,000 mcg, Intramuscular, Q30 Days, Coy Ayoub DO, 1,000 mcg at 23 1151        MD Alexis Scott MD  2024 10:07 AM  Sign when Signing Visit  UROLOGY PROGRESS NOTE   [facility]  [address] [place] [address] [address]  [address]  [place] [address]  [phone]  Fax:[phone]  [email or web address]      NAME: Nancy Jones  AGE: 64 y.o. SEX: female  : 1960   MRN: [id number]    DATE: 2024  TIME: 8:19 AM    Assessment and Plan               Chief Complaint   No chief complaint on file.      History of Present Illness   Follow-up chronic interstitial cystitis and chronic bladder pain last seen by me 2024.  At that time I tried to switch her to Uribel as a trial but I felt she may need to continue with Pyridium  indefinitely and although I was not crazy by keeping her on that for long-term it seem to be the only thing that works.  I also referred her back to pelvic floor therapy.  She had a positive urine culture in March but there is significant antibiotic resistance.  However urine culture June 30, 2024 showed greater than 100,000 colonies of E. coli and less than 10,000 colonies of Proteus.  This was treated with Keflex.  Because of the recurrent UTIs she is here for cystoscopy.       Cystoscopy     Date/Time  7/30/2024 10:00 AM     Performed by  Alexis Hobson MD   Authorized by  Alexis Hobson MD     Universal Protocol:  Consent: Verbal consent obtained. Written consent obtained.      Procedure Details:  Procedure type: cystoscopy    Additional Procedure Details: Cystoscopy Procedure Note        Pre-operative Diagnosis: Recurrent UTI, chronic interstitial cystitis with bladder pain    Post-operative Diagnosis: Same    Procedure: Flexible cystoscopy    Surgeon: Alexis Hobson MD    Anesthesia: 1% Xylocaine per urethra    EBL: Minimal    Complications: none    Procedure Details   The risks, benefits, complications, treatment options, and expected outcomes were discussed with the patient. The patient concurred with the proposed plan, giving informed consent.    Cystoscopy was performed today under local anesthesia, using sterile technique. The patient was placed in the supine position, prepped with Betadine, and draped in the usual sterile fashion. The flexible cystocope was used to inspect both the urethra and bladder    Findings:  Urethra: Normal without stricture.    Bladder:  Smooth, not trabeculated and there were no stones tumors or other lesions.  The orifices were orthotopic and intact.           Specimens: none                 Complications:  None           Disposition: To home            Condition:  Stable              The following portions of the patient's history were reviewed and updated as appropriate:  "allergies, current medications, past family history, past medical history, past social history, past surgical history and problem list.  Past Medical History:   Diagnosis Date   • A-fib ([facility]) 03/2020   • Allergic    • Anxiety    • Arthritis    • Asthma    • Back pain     and muscle pain   • Bruises easily    • Chronic pain disorder     Interstitial pain   • Colon polyp    • Dental bridge present    • Depression    • Eczema    • GERD (gastroesophageal reflux disease)    • Heart murmur    • History of blood clots     per pt \"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\"   • History of pneumonia    • Hives    • Hyperlipidemia    • Hypertension    • Infertility, female    • Interstitial cystitis    • Migraine     sees neurologist   • Motion sickness    • Obesity    • Palpitations    • PONV (postoperative nausea and vomiting)    • Premature atrial contractions 11/09/2022   • Psychiatric disorder    • TIA (transient ischemic attack) 09/2022    per pt --\"had MRI and 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\"   • Urinary incontinence     wears Pads   • Venous insufficiency 08/01/2017   • Wears glasses      Past Surgical History:   Procedure Laterality Date   • COLONOSCOPY     • DILATION AND CURETTAGE OF UTERUS     • EGD     • EXPLORATORY LAPAROTOMY      for infertility   • EXPLORATORY LAPAROTOMY W/ BOWEL RESECTION N/A 7/19/2023    Procedure: LAPAROTOMY EXPLORATORY W/ BOWEL RESECTION;  Surgeon: Crista Recinos MD;  [facility];  Service: General   • HAND SURGERY      Hand excision of tendon cyst   • ND LAPAROSCOPIC APPENDECTOMY N/A 05/03/2022    Procedure: APPENDECTOMY LAPAROSCOPIC;  Surgeon: Vin Fields MD;  [facility];  Service: General   • ND LAPS ABD PRTM&OMENTUM DX W/WO SPEC BR/WA SPX N/A 7/19/2023    Procedure: LAPAROSCOPY DIAGNOSTIC, LYSIS OF ADHESIONS, LAPAROSCOPY TAKE TAKEDOWN OF LEFT COLON, EXPLORATORY LAPAROSCOPY, LYSIS OF ADHESIONS, RESECTION OF TRANSVERSE COLON SPLENIC FLEXURE AND " DESCENDING COLON , TAKE DOWN OF SPLENIC FLEXURE, SIGMOIDOSCOPY, MOBILIZATION OF RIGHT COLON, PRIMARY ANASTOMOSIS EEA 29, TAP BLOCK;  Surgeon: Crista Recinos MD;  [facility];  Service: General   • CA TCAT IV STENT CRV CRTD ART EMBOLIC PROTECJ Left 4/29/2024    Procedure: ANGIOPLASTY ARTERY CAROTID W/ STENT;  Surgeon: Roberto García DO;  [facility];  Service: Vascular   • CA TEAEC W/PATCH GRF CAROTID VERTB SUBCLAV NECK INC Left 1/31/2023    Procedure: EXPLORATION OF LEFT CAROTID ARTERY; ABORTED ENDARTERECTOMY ARTERY CAROTID;  Surgeon: Roberto García DO;  [facility];  Service: Vascular   • SUPERIOR MESTENTERIC ARTERY STENT     • WISDOM TOOTH EXTRACTION       shoulder  Review of Systems   Review of Systems    Active Problem List     Patient Active Problem List   Diagnosis   • Angina pectoris (HCC)   • Essential hypertension   • Migraines   • AMD (age-related macular degeneration), bilateral   • Allergic reaction   • Gastroesophageal reflux disease without esophagitis   • Angio-edema   • A-fib ([facility])   • Lumbar radiculopathy   • CAMILLE (obstructive sleep apnea)   • Hypertensive heart disease with congestive heart failure (HCC)   • Unspecified diastolic (congestive) heart failure ([facility])   • 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[facility])   • COPD (chronic obstructive pulmonary disease) ([facility])   • Urinary retention   • Peripheral vascular disease ([facility])   • Appendix disease   • Carotid artery stenosis   • Stenosis of left carotid artery   • Asymptomatic stenosis of left carotid artery   • Hepatic steatosis   • Partial facial nerve palsy   • Injury of left facial nerve   • Paresthesia   • Colitis   • Chronic migraine w/o aura w/o status migrainosus, not intractable   • IIH (idiopathic intracranial hypertension)   • Hematochezia   • Left carotid stenosis   • Colonic ischemia ([facility])   • Acute postoperative pain   • S/P small bowel resection   • Anxiety   • Omental infarction ([facility])   • Hypokalemia   • Night  sweats   • Dysuria   • Incisional hernia, without obstruction or gangrene   • Stage 3 chronic kidney disease, unspecified whether stage 3a or 3b CKD (HCC)   • Ventral hernia without obstruction or gangrene   • Atherosclerosis of native arteries of extremities with rest pain, bilateral legs (HCC)   • Acute on chronic diastolic (congestive) heart failure (HCC)   • Left facial numbness   • Other chest pain   • Acute pain of left knee   • Acute lateral meniscus tear of left knee   • Continuous opioid dependence (HCC)   • Obesity, morbid (HCC)   • Complex tear of lateral meniscus of left knee as current injury, initial encounter       Objective   LMP  (LMP Unknown)     Physical Exam  Vitals reviewed.   Constitutional:       Appearance: Normal appearance.   HENT:      Head: Normocephalic and atraumatic.   Eyes:      Extraocular Movements: Extraocular movements intact.   Pulmonary:      Effort: Pulmonary effort is normal.   Musculoskeletal:         General: Normal range of motion.      Cervical back: Normal range of motion.   Skin:     Coloration: Skin is not jaundiced or pale.   Neurological:      General: No focal deficit present.      Mental Status: She is alert and oriented to person, place, and time. Mental status is at baseline.   Psychiatric:         Mood and Affect: Mood normal.         Behavior: Behavior normal.         Thought Content: Thought content normal.         Judgment: Judgment normal.             Current Medications     Current Outpatient Medications:   •  acetaminophen (TYLENOL) 325 mg tablet, Take 2 tablets (650 mg total) by mouth every 6 (six) hours, Disp: , Rfl: 0  •  amiodarone 100 mg tablet, Take 1 tablet (100 mg total) by mouth daily, Disp: 90 tablet, Rfl: 3  •  apixaban (Eliquis) 5 mg, TAKE ONE TABLET BY MOUTH TWICE DAILY, Disp: 180 tablet, Rfl: 1  •  aspirin (ECOTRIN LOW STRENGTH) 81 mg EC tablet, Take 81 mg by mouth daily, Disp: , Rfl:   •  chlordiazepoxide-clidinium (LIBRAX) 5-2.5 mg per  capsule, Take 1 capsule by mouth daily as needed for cramping, Disp: 90 capsule, Rfl: 0  •  clopidogrel (Plavix) 75 mg tablet, Take 1 tablet (75 mg total) by mouth daily, Disp: 30 tablet, Rfl: 3  •  Diclofenac Sodium (VOLTAREN) 1 %, Apply 2 g topically 4 (four) times a day, Disp: 100 g, Rfl: 2  •  diltiazem (CARDIZEM CD) 120 mg 24 hr capsule, TAKE ONE CAPSULE BY MOUTH ONCE DAILY, Disp: 90 capsule, Rfl: 1  •  Docusate Sodium (COLACE PO), Take by mouth daily at bedtime, Disp: , Rfl:   •  Evolocumab (Repatha SureClick) 140 MG/ML SOAJ, Inject 1 mL (140 mg total) under the skin every 14 (fourteen) days, Disp: 2 mL, Rfl: 11  •  fexofenadine (ALLEGRA) 180 MG tablet, Take 180 mg by mouth daily, Disp: , Rfl:   •  fluticasone-vilanterol (BREO ELLIPTA) 200-25 MCG/INH inhaler, Inhale 1 puff daily Rinse mouth after use., Disp: 3 Inhaler, Rfl: 3  •  furosemide (LASIX) 40 mg tablet, Take 1 tablet (40 mg total) by mouth daily, Disp: 90 tablet, Rfl: 0  •  LORazepam (ATIVAN) 1 mg tablet, Take 1 tablet (1 mg total) by mouth 3 (three) times a day, Disp: 90 tablet, Rfl: 0  •  LORazepam (ATIVAN) 2 mg tablet, Take 1 tablet (2 mg total) by mouth every 8 (eight) hours as needed for anxiety, Disp: 90 tablet, Rfl: 0  •  magnesium Oxide (MAG-OX) 400 mg TABS, Take 1 tablet (400 mg total) by mouth daily at bedtime, Disp: 90 tablet, Rfl: 3  •  methocarbamol (ROBAXIN) 500 mg tablet, Take 2 tablets (1,000 mg total) by mouth 3 (three) times a day, Disp: 180 tablet, Rfl: 5  •  metoprolol succinate (TOPROL-XL) 100 mg 24 hr tablet, Take 1 tablet (100 mg total) by mouth 2 (two) times a day, Disp: 180 tablet, Rfl: 1  •  omeprazole (PriLOSEC) 40 MG capsule, Take 1 capsule (40 mg total) by mouth 2 (two) times a day before meals, Disp: 60 capsule, Rfl: 5  •  phenazopyridine (PYRIDIUM) 200 mg tablet, Take 1 tablet (200 mg total) by mouth 2 (two) times a day as needed for bladder spasms, Disp: 30 tablet, Rfl: 5  •  polyethylene glycol (MIRALAX) 17 g packet,  Take 17 g by mouth daily as needed (constipation), Disp: , Rfl:   •  promethazine (PHENERGAN) 25 mg tablet, Take 1 tablet (25 mg total) by mouth every 6 (six) hours as needed for nausea or vomiting, Disp: 60 tablet, Rfl: 5  •  senna (SENOKOT) 8.6 mg, Take 1 tablet (8.6 mg total) by mouth daily as needed for constipation, Disp: , Rfl:   •  spironolactone (ALDACTONE) 25 mg tablet, TAKE ONE TABLET BY MOUTH TWICE DAILY, Disp: 180 tablet, Rfl: 1  •  traMADol (ULTRAM) 50 mg tablet, Take 2 tablets (100 mg total) by mouth every 8 (eight) hours as needed for moderate pain, Disp: 180 tablet, Rfl: 0  •  triamcinolone (KENALOG) 0.1 % cream, Apply topically 2 (two) times a day, Disp: 15 g, Rfl: 2    Current Facility-Administered Medications:   •  cyanocobalamin injection 1,000 mcg, 1,000 mcg, Intramuscular, Q30 Days, Coy Ayoub DO, 1,000 mcg at 11/16/23 1151        Alexis Hobson MD

## 2024-07-30 NOTE — TELEPHONE ENCOUNTER
Called and spoke with patients pharmacy to clarify both orders for Keflex. Advised that 500 mg dose is to take x's 7 days then patient to take 250 mg as a prophylactic dose. Pharmacy will fill as ordered. Patient aware to have routine BMP completed as ordered.

## 2024-07-30 NOTE — PATIENT INSTRUCTIONS
You have just undergone cystoscopy of the bladder.  Expect to see some blood in the urine, which should clear up within 24 to 48 hours.  You also may experience burning urgency and frequency of urination which is normal.  Call for fever greater than 101.5 degrees, severe pain not relieved by over-the-counter medicines, or inability to urinate.  You may resume all normal activities.  For irritative symptoms, you can purchase over-the-counter remedies such as cystek or Azo, or any other preparations advertised in the pharmacy for bladder symptoms.  I will call in Pyridium which is a prescription strength medication for bladder irritability upon your request.   Take a full course of Keflex 500 mg 4 times a day for 7 days, then start Keflex 250 mg daily for total of 6 months.

## 2024-07-30 NOTE — TELEPHONE ENCOUNTER
Patient is to take a full course of the 500 mg Keflex for a current UTI.  Patient will then begin a 6-month daily 250 mg Keflex dose prophylactically. Bmp was ordered

## 2024-07-30 NOTE — TELEPHONE ENCOUNTER
[facility] need clarification on Keflex prescriptions.   500 mg and 250 mg prescriptions of cephalexin (Keflex) were both sent.  Also insurance is requesting that creatinine clearance be reviewed in case medication dose needs adjusted.

## 2024-08-01 ENCOUNTER — PATIENT OUTREACH (OUTPATIENT)
Dept: CASE MANAGEMENT | Facility: OTHER | Age: 64
End: 2024-08-01

## 2024-08-01 LAB — BACTERIA UR CULT: NORMAL

## 2024-08-02 ENCOUNTER — TELEPHONE (OUTPATIENT)
Age: 64
End: 2024-08-02

## 2024-08-02 NOTE — TELEPHONE ENCOUNTER
Pt called in stating received a phone call from Gen Surg offering a sooner appt for 08/05/24. Gen Surg David was contacted and there's no record about the phone call. Seems like the pt received a call from Care Management. Pt was told is still placed on the cancellation list and if something comes up, will receive a call from the practice.

## 2024-08-07 NOTE — PROGRESS NOTES
Assessment/Plan:   Nancy Jones is a 64 y.o.female who comes in today for incisional hernias.    PMH: CKD, a-fib with Eliquis and 81mg ASA, HTN, emphysema, Stage 3 CKD, mesenteric thrombosis, CAMILLE, congestive heart failure    Patient has multiple epigastric and umbilical hernias and a robotic repair was entertained.  Cardiology saw her and made her a low to moderate risk for surgery and okay to hold the Eliquis for 2 days.    CT on April 2024 shows: Midline scar from prior laparotomy and 3 midline ventral abdominal incisional hernias containing bowel measuring up to 7.1 cm fascial defect as well as a supraumbilical hernia measuring 4.6 cm fascial defect and a supraumbilical hernia approximately 10 cm above the umbilicus measuring 4 cm defect.  This is fairly significant defects.  She is status post right hemicolectomy and partial transverse colon.    She underwent emergency surgery with a bowel resection in 2023 specifically left colectomy, splenic flexure takedown mobilization of the right colon and a primary anastomosis, open, for ischemia of the splenic flexure.  Aced on her comorbidities, her weight and the emergency nature of the surgery is not surprising she now has multiple incisional hernias.      Plan: Likely open incisional hernia repair considering the location, and the large defects.  Multiple incisional hernias, some defects measuring as high as 7 cm.  May try component separation or retrorectus repair if her fascia allows and she does not have loss of domain.          High risk for cardiac and stroke events perioperatively.  Patient is very much aware but wishes to proceed with surgery.    Lengthy discussion regarding her vascular, pulmonary and wound infection risk all of which are higher than average due to her morbid obesity and comorbid vascular conditions.  She does now have a carotid stent and an SMA stent.  We discussed weight loss but she feels she is already on a strict soft food diet.  She  feels she has significant nausea and pain in the hernia site related to the hernia.  We discussed the risks, benefits and alternatives including the life-threatening risks she may undergo.  She has been cleared for surgery by cardiology and does not wish to wait any further.  She understands all these risks and wishes strongly to proceed after lengthy preoperative discussion.    CT 12/14/23:  New left periumbilical widemouthed ventral abdominal wall hernia containing several loops of nonobstructed small bowel with opening of 7 cm.  Several smaller fat-containing midline ventral abdominal wall hernias.  Improved findings of omental infarct in the right upper quadrant with minimal residual stranding identified.    Personally reviewed CT at length with patient in office. I agree with radiology reading.   ______________________________________________________  HPI:  Nancy Jones is a 64 y.o.female who comes in today for postoperative check after recent recent laparotomy.     She had vascular issues and surgery was postponed from her last visit.    She is on Eliquis for atrial fibrillation and amiodarone.  Patient was taken off Plavix and placed on aspirin by PCP but remains on Eliquis.  History of SMA thrombosis at [facility] with stenting of the SMA on August 2021.  She went attempted left carotid artery endarterectomy in January 2023 and this was aborted due to anatomy.  Vascular also cleared her for surgery.  She underwent a angioplasty of the carotid artery with a stent in April 2024 after she saw us in February 2024.  Patient has many smaller epigastric hernias and a larger umbilical hernia with bowel involvement. These area all incisional hernias from her laparotomy.  We discussed she could have a lot of adhesions from prior surgery that could complicate this surgery and we may need to open. She understands. We did discuss symptoms of strangulation/incarceration of hernia and when to go to  ER.      Patient today states she is having pain left of her umbilicus and a hard lump. She states she noticed this about 3-4 weeks ago. The lump is hard. She states she takes mirilax normally for constipation which helps keeps her bowel regulated. Denies nausea, vomiting, fevers, and blood in stool. Patient states she Has been doing well other then the hernias and the associated abdominal pain. She did have CT 12/14/23.    History of LAPAROTOMY EXPLORATORY W/ BOWEL RESECTION. LAPAROSCOPY DIAGNOSTIC. LYSIS OF ADHESIONS. LAPARASCOPY TAKE TAKEDOWN OF LEFT COLON.  EXPLORATORY LAPAROSCOPY LYSIS OF ADHESIONS. RESECTION TRANSVERSE COLON , SPLENIC FLEXURE, . AND DESCENDING COLON . TAKE DOWN OF SPLENIC FLEXURE. SIGMOIDOSCOPY.MOBILIZATION OF RIGHT COLON. PRIMARY ANASTOMOSIS EEA 29. TAP BLOCK on 7/19/23 with Dr. Recinos.     ROS:  General ROS: negative for - chills, fatigue, fever or night sweats, weight loss  Respiratory ROS: no cough, shortness of breath, or wheezing  Cardiovascular ROS: no chest pain or dyspnea on exertion  Genito-Urinary ROS: no dysuria, trouble voiding, or hematuria  Musculoskeletal ROS: negative for - gait disturbance, joint pain or muscle pain  Neurological ROS: no TIA or stroke symptoms  GI ROS: see HPI  Skin ROS: no new rashes or lesions   Lymphatic ROS: no new adenopathy noted by pt.   GYN ROS: see HPI, no new GYN history or bleeding noted  Psy ROS: no new mental or behavioral disturbances         Patient Active Problem List   Diagnosis   • Angina pectoris (HCC)   • Essential hypertension   • Migraines   • AMD (age-related macular degeneration), bilateral   • Allergic reaction   • Gastroesophageal reflux disease without esophagitis   • Angio-edema   • A-fib (HCC)   • Lumbar radiculopathy   • CAMILLE (obstructive sleep apnea)   • Hypertensive heart disease with congestive heart failure (HCC)   • Unspecified diastolic (congestive) heart failure (HCC)   • 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HCC)   • COPD  (chronic obstructive pulmonary disease) ([facility])   • Urinary retention   • Peripheral vascular disease ([facility])   • Appendix disease   • Carotid artery stenosis   • Stenosis of left carotid artery   • Asymptomatic stenosis of left carotid artery   • Hepatic steatosis   • Partial facial nerve palsy   • Injury of left facial nerve   • Paresthesia   • Colitis   • Chronic migraine w/o aura w/o status migrainosus, not intractable   • IIH (idiopathic intracranial hypertension)   • Hematochezia   • Left carotid stenosis   • Colonic ischemia ([facility])   • Acute postoperative pain   • S/P small bowel resection   • Anxiety   • Omental infarction ([facility])   • Hypokalemia   • Night sweats   • Dysuria   • Incisional hernia, without obstruction or gangrene   • Stage 3 chronic kidney disease, unspecified whether stage 3a or 3b CKD ([facility])   • Ventral hernia without obstruction or gangrene   • Atherosclerosis of native arteries of extremities with rest pain, bilateral legs ([facility])   • Acute on chronic diastolic (congestive) heart failure ([facility])   • Left facial numbness   • Other chest pain   • Acute pain of left knee   • Acute lateral meniscus tear of left knee   • Continuous opioid dependence ([facility])   • Obesity, morbid ([facility])   • Complex tear of lateral meniscus of left knee as current injury, initial encounter       Allergies:  Ace inhibitors, Benicar [olmesartan], Hydralazine, Other, Valsartan, Ampicillin, Sulfa antibiotics, Motrin [ibuprofen], and Wound dressing adhesive      Current Outpatient Medications:   •  acetaminophen (TYLENOL) 325 mg tablet, Take 2 tablets (650 mg total) by mouth every 6 (six) hours, Disp: , Rfl: 0  •  amiodarone 100 mg tablet, Take 1 tablet (100 mg total) by mouth daily, Disp: 90 tablet, Rfl: 3  •  apixaban (Eliquis) 5 mg, TAKE ONE TABLET BY MOUTH TWICE DAILY, Disp: 180 tablet, Rfl: 1  •  aspirin (ECOTRIN LOW STRENGTH) 81 mg EC tablet, Take 81 mg by mouth daily, Disp: , Rfl:   •  cephalexin (KEFLEX) 250 mg capsule, Take  1 capsule (250 mg total) by mouth in the morning, Disp: 90 capsule, Rfl: 1  •  chlordiazepoxide-clidinium (LIBRAX) 5-2.5 mg per capsule, Take 1 capsule by mouth daily as needed for cramping, Disp: 90 capsule, Rfl: 0  •  clopidogrel (Plavix) 75 mg tablet, Take 1 tablet (75 mg total) by mouth daily, Disp: 30 tablet, Rfl: 3  •  Diclofenac Sodium (VOLTAREN) 1 %, Apply 2 g topically 4 (four) times a day, Disp: 100 g, Rfl: 2  •  diltiazem (CARDIZEM CD) 120 mg 24 hr capsule, TAKE ONE CAPSULE BY MOUTH ONCE DAILY, Disp: 90 capsule, Rfl: 1  •  Docusate Sodium (COLACE PO), Take by mouth daily at bedtime, Disp: , Rfl:   •  Evolocumab (Repatha SureClick) 140 MG/ML SOAJ, Inject 1 mL (140 mg total) under the skin every 14 (fourteen) days, Disp: 2 mL, Rfl: 11  •  fexofenadine (ALLEGRA) 180 MG tablet, Take 180 mg by mouth daily, Disp: , Rfl:   •  fluticasone-vilanterol (BREO ELLIPTA) 200-25 MCG/INH inhaler, Inhale 1 puff daily Rinse mouth after use., Disp: 3 Inhaler, Rfl: 3  •  furosemide (LASIX) 40 mg tablet, Take 1 tablet (40 mg total) by mouth daily, Disp: 90 tablet, Rfl: 0  •  LORazepam (ATIVAN) 1 mg tablet, Take 1 tablet (1 mg total) by mouth 3 (three) times a day, Disp: 90 tablet, Rfl: 0  •  LORazepam (ATIVAN) 2 mg tablet, Take 1 tablet (2 mg total) by mouth every 8 (eight) hours as needed for anxiety, Disp: 90 tablet, Rfl: 0  •  magnesium Oxide (MAG-OX) 400 mg TABS, Take 1 tablet (400 mg total) by mouth daily at bedtime, Disp: 90 tablet, Rfl: 3  •  methocarbamol (ROBAXIN) 500 mg tablet, Take 2 tablets (1,000 mg total) by mouth 3 (three) times a day, Disp: 180 tablet, Rfl: 5  •  metoprolol succinate (TOPROL-XL) 100 mg 24 hr tablet, Take 1 tablet (100 mg total) by mouth 2 (two) times a day, Disp: 180 tablet, Rfl: 1  •  omeprazole (PriLOSEC) 40 MG capsule, Take 1 capsule (40 mg total) by mouth 2 (two) times a day before meals, Disp: 60 capsule, Rfl: 5  •  phenazopyridine (PYRIDIUM) 200 mg tablet, Take 1 tablet (200 mg total) by  "mouth 2 (two) times a day as needed for bladder spasms, Disp: 30 tablet, Rfl: 5  •  polyethylene glycol (MIRALAX) 17 g packet, Take 17 g by mouth daily as needed (constipation), Disp: , Rfl:   •  promethazine (PHENERGAN) 25 mg tablet, Take 1 tablet (25 mg total) by mouth every 6 (six) hours as needed for nausea or vomiting, Disp: 60 tablet, Rfl: 5  •  senna (SENOKOT) 8.6 mg, Take 1 tablet (8.6 mg total) by mouth daily as needed for constipation, Disp: , Rfl:   •  spironolactone (ALDACTONE) 25 mg tablet, TAKE ONE TABLET BY MOUTH TWICE DAILY, Disp: 180 tablet, Rfl: 1  •  traMADol (ULTRAM) 50 mg tablet, Take 2 tablets (100 mg total) by mouth every 8 (eight) hours as needed for moderate pain, Disp: 180 tablet, Rfl: 0  •  triamcinolone (KENALOG) 0.1 % cream, Apply topically 2 (two) times a day, Disp: 15 g, Rfl: 2    Current Facility-Administered Medications:   •  cyanocobalamin injection 1,000 mcg, 1,000 mcg, Intramuscular, Q30 Days, Coy Ayoub DO, 1,000 mcg at 11/16/23 1151    Past Medical History:   Diagnosis Date   • A-fib ([facility]) 03/2020   • Allergic    • Anxiety    • Arthritis    • Asthma    • Back pain     and muscle pain   • Bruises easily    • Chronic pain disorder     Interstitial pain   • Colon polyp    • Dental bridge present    • Depression    • Eczema    • GERD (gastroesophageal reflux disease)    • Heart murmur    • History of blood clots     per pt \"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\"   • History of pneumonia    • Hives    • Hyperlipidemia    • Hypertension    • Infertility, female    • Interstitial cystitis    • Migraine     sees neurologist   • Motion sickness    • Obesity    • Palpitations    • PONV (postoperative nausea and vomiting)    • Premature atrial contractions 11/09/2022   • Psychiatric disorder    • TIA (transient ischemic attack) 09/2022    per pt --\"had MRI and 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\"   • Urinary incontinence     wears Pads   • Venous insufficiency " 08/01/2017   • Wears glasses        Past Surgical History:   Procedure Laterality Date   • COLONOSCOPY     • DILATION AND CURETTAGE OF UTERUS     • EGD     • EXPLORATORY LAPAROTOMY      for infertility   • EXPLORATORY LAPAROTOMY W/ BOWEL RESECTION N/A 7/19/2023    Procedure: LAPAROTOMY EXPLORATORY W/ BOWEL RESECTION;  Surgeon: Crista Recinos MD;  Location: [facility];  Service: General   • HAND SURGERY      Hand excision of tendon cyst   • SD LAPAROSCOPIC APPENDECTOMY N/A 05/03/2022    Procedure: APPENDECTOMY LAPAROSCOPIC;  Surgeon: Vin Fields MD;  [facility]: [facility];  Service: General   • SD LAPS ABD PRTM&OMENTUM DX W/WO SPEC BR/WA SPX N/A 7/19/2023    Procedure: LAPAROSCOPY DIAGNOSTIC, LYSIS OF ADHESIONS, LAPAROSCOPY TAKE TAKEDOWN OF LEFT COLON, EXPLORATORY LAPAROSCOPY, LYSIS OF ADHESIONS, RESECTION OF TRANSVERSE COLON SPLENIC FLEXURE AND DESCENDING COLON , TAKE DOWN OF SPLENIC FLEXURE, SIGMOIDOSCOPY, MOBILIZATION OF RIGHT COLON, PRIMARY ANASTOMOSIS EEA 29, TAP BLOCK;  Surgeon: Crista Recinos MD;  [facility];  Service: General   • SD TCAT IV STENT CRV CRTD ART EMBOLIC PROTECJ Left 4/29/2024    Procedure: ANGIOPLASTY ARTERY CAROTID W/ STENT;  Surgeon: Roberto García DO;  Location: [facility];  Service: Vascular   • SD TEAEC W/PATCH GRF CAROTID VERTB SUBCLAV NECK INC Left 1/31/2023    Procedure: EXPLORATION OF LEFT CAROTID ARTERY; ABORTED ENDARTERECTOMY ARTERY CAROTID;  Surgeon: Roberto García DO;  Location: [facility];  Service: Vascular   • SUPERIOR MESTENTERIC ARTERY STENT     • WISDOM TOOTH EXTRACTION         Family History   Problem Relation Age of Onset   • Lung cancer Mother 60   • Brain cancer Mother 60   • Diabetes Father    • Depression Father    • Other Father         septic   • Allergies Sister    • Hashimoto's thyroiditis Sister    • Abdominal aortic aneurysm Sister    • Diabetes Sister    • No Known Problems Sister    • No Known Problems Maternal Grandmother    • No Known  Problems Maternal Grandfather    • No Known Problems Paternal Grandmother    • No Known Problems Paternal Grandfather    • Hodgkin's lymphoma Brother    • No Known Problems Brother    • No Known Problems Maternal Aunt    • Diabetes Other    • Breast cancer Neg Hx         reports that she quit smoking about 5 years ago. Her smoking use included cigarettes. She started smoking about 15 years ago. She has a 5 pack-year smoking history. She has been exposed to tobacco smoke. She has never used smokeless tobacco. She reports that she does not currently use alcohol. She reports that she does not use drugs.    Vitals:    08/12/24 1416   BP: 134/82   Pulse: 57   Resp: 16   Temp: 97.6 °F (36.4 °C)   SpO2: 95%       I personally reviewed the CAT scan with Adina today.      PHYSICAL EXAM  General: normal, cooperative, no distress  Lungs clear to auscultation  Left carotid I cannot appreciate a bruit  Heart normal sinus rhythm  Extremities without edema  Abdominal: soft, nondistended or non-tender There are very small epigastric hernias easily reduced and a umbilical /periumbilical to the left hernia that is not reducible at today's visit.   Ambulatory without obvious musculoskeletal defect  Neurological exam grossly intact  Skin without jaundice.        Crista Badillo MD    Date: 8/12/2024 Time: 2:46 PM

## 2024-08-08 ENCOUNTER — TELEPHONE (OUTPATIENT)
Age: 64
End: 2024-08-08

## 2024-08-08 ENCOUNTER — PATIENT OUTREACH (OUTPATIENT)
Dept: CASE MANAGEMENT | Facility: OTHER | Age: 64
End: 2024-08-08

## 2024-08-08 ENCOUNTER — APPOINTMENT (OUTPATIENT)
Dept: LAB | Facility: HOSPITAL | Age: 64
End: 2024-08-08
Payer: COMMERCIAL

## 2024-08-08 DIAGNOSIS — N39.0 RECURRENT UTI: ICD-10-CM

## 2024-08-08 DIAGNOSIS — R39.9 UTI SYMPTOMS: ICD-10-CM

## 2024-08-08 LAB
ANION GAP SERPL CALCULATED.3IONS-SCNC: 8 MMOL/L (ref 4–13)
BUN SERPL-MCNC: 22 MG/DL (ref 5–25)
CALCIUM SERPL-MCNC: 9.5 MG/DL (ref 8.4–10.2)
CHLORIDE SERPL-SCNC: 100 MMOL/L (ref 96–108)
CO2 SERPL-SCNC: 30 MMOL/L (ref 21–32)
CREAT SERPL-MCNC: 1.02 MG/DL (ref 0.6–1.3)
GFR SERPL CREATININE-BSD FRML MDRD: 57 ML/MIN/1.73SQ M
GLUCOSE P FAST SERPL-MCNC: 89 MG/DL (ref 65–99)
POTASSIUM SERPL-SCNC: 4.1 MMOL/L (ref 3.5–5.3)
SODIUM SERPL-SCNC: 138 MMOL/L (ref 135–147)

## 2024-08-08 PROCEDURE — 80048 BASIC METABOLIC PNL TOTAL CA: CPT

## 2024-08-08 PROCEDURE — 87086 URINE CULTURE/COLONY COUNT: CPT

## 2024-08-08 PROCEDURE — 36415 COLL VENOUS BLD VENIPUNCTURE: CPT

## 2024-08-08 NOTE — TELEPHONE ENCOUNTER
Adina said she received a voicemail today from someone asking if she is still taking Repatha.      She said she is still taking it. She has 2 syringes left with one dose due this week.   Patient is not sure who called but thought it might be related to a prior auth for the med.

## 2024-08-08 NOTE — TELEPHONE ENCOUNTER
Please see encounter 7/18/24 for repatha prior auth.  Multiple conversations for repatha prior auth.

## 2024-08-09 ENCOUNTER — TELEPHONE (OUTPATIENT)
Age: 64
End: 2024-08-09

## 2024-08-09 DIAGNOSIS — M54.16 LUMBAR RADICULOPATHY: Primary | ICD-10-CM

## 2024-08-09 LAB — BACTERIA UR CULT: NORMAL

## 2024-08-09 NOTE — TELEPHONE ENCOUNTER
Pt called and said that she neglected to get xrays in her back for a long time when Dr. Ayoub had put an order, and that the order  (looks like it was in  unless I missed it). Pt said because she had so much going on she pushed it back but would like form Dr. Ayoub to put another order for her to get xrays of her back. Please advise [phone] thank you.

## 2024-08-12 ENCOUNTER — TELEPHONE (OUTPATIENT)
Dept: BARIATRICS | Facility: CLINIC | Age: 64
End: 2024-08-12

## 2024-08-12 ENCOUNTER — OFFICE VISIT (OUTPATIENT)
Dept: SURGERY | Facility: CLINIC | Age: 64
End: 2024-08-12
Payer: COMMERCIAL

## 2024-08-12 VITALS
HEART RATE: 57 BPM | DIASTOLIC BLOOD PRESSURE: 82 MMHG | BODY MASS INDEX: 33.46 KG/M2 | WEIGHT: 196 LBS | HEIGHT: 64 IN | OXYGEN SATURATION: 95 % | SYSTOLIC BLOOD PRESSURE: 134 MMHG | TEMPERATURE: 97.6 F | RESPIRATION RATE: 16 BRPM

## 2024-08-12 DIAGNOSIS — I70.223 ATHEROSCLEROSIS OF NATIVE ARTERIES OF EXTREMITIES WITH REST PAIN, BILATERAL LEGS (HCC): ICD-10-CM

## 2024-08-12 DIAGNOSIS — K55.069 MESENTERIC ARTERY THROMBOSIS (HCC): ICD-10-CM

## 2024-08-12 DIAGNOSIS — N18.30 STAGE 3 CHRONIC KIDNEY DISEASE, UNSPECIFIED WHETHER STAGE 3A OR 3B CKD (HCC): ICD-10-CM

## 2024-08-12 DIAGNOSIS — E66.01 OBESITY, MORBID (HCC): Primary | ICD-10-CM

## 2024-08-12 DIAGNOSIS — K43.2 INCISIONAL HERNIA, WITHOUT OBSTRUCTION OR GANGRENE: ICD-10-CM

## 2024-08-12 DIAGNOSIS — F11.20 CONTINUOUS OPIOID DEPENDENCE (HCC): ICD-10-CM

## 2024-08-12 DIAGNOSIS — Z90.49 S/P SMALL BOWEL RESECTION: ICD-10-CM

## 2024-08-12 DIAGNOSIS — J43.9 PULMONARY EMPHYSEMA, UNSPECIFIED EMPHYSEMA TYPE (HCC): ICD-10-CM

## 2024-08-12 DIAGNOSIS — I50.33 ACUTE ON CHRONIC DIASTOLIC (CONGESTIVE) HEART FAILURE (HCC): ICD-10-CM

## 2024-08-12 PROCEDURE — 99214 OFFICE O/P EST MOD 30 MIN: CPT | Performed by: SURGERY

## 2024-08-12 NOTE — TELEPHONE ENCOUNTER
Pt will call back and r/s 8/26/24 [facility]wm appt after her surgery  
[FreeTextEntry1] : Kalin TOSCANO. Leventhal DO\par [address]\[place] [address]\par [phone]\par \par Dear Dr. Leventhal,\par \par Reason for visit: Bladder cancer s/p TURBT. Left renal obstruction. \par \par This is a 72 year old gentleman with previous right urothelial carcinoma s/p right nephroureterectomy and recurrent bladder cancer s/p TURBT presenting with left renal obstruction. The patient previously experienced a diffuse pruritic rash on arms and legs with Mitomycin bladder instillation. The patient returns today for interval surveillance cystoscopy. Since he was last seen, the patient's creatinine levels have increased to  2.14. He has no urinary complaints or difficulties. The patient is overall well. He denies any changes in health. The past medical history and family history and social history are unchanged. All other review of systems are negative. Patient denies any changes in medications. Medication list was reconciled.\par \par On examination, the patient is an older-appearing gentleman in no acute distress. He is alert and oriented follows commands. He has normal mood and affect. He is normocephalic. Neck is supple. Oral no thrush Respirations are unlabored. His abdomen is soft and nontender. Bladder is nonpalpable. No CVA tenderness. Neurologically he is grossly intact. No peripheral edema. Skin without gross abnormality. He has normal male external genitalia. Normal meatus. Bilateral testes are descended intrascrotally and normal to palpation\par  \par His BMP in June 2021 demonstrated decreased renal functions, creatinine 2.14. His previous creatinine was 1.85.  His urine cytology in January 2022 was negative for high grade urothelial carcinoma. \par \par Assessment: Bladder cancer, s/p TURBT. Left ureteral obstruction. Elevated creatinine, increased.  \par \par I counseled the patient. In terms of his elevated creatinine, I recommended the patient repeat BMP to monitor renal functions. I also recommended the patient undergo KUB x-ray for further evaluation. In terms of his previous bladder cancer, his cystoscopy today was negative for bladder cancer. I reassured the patient. He will obtain urine cytology today to ensure stability. The patient will follow up in 3 months.  Risks and alternatives were discussed. I answered the patient questions. The patient will follow-up as directed and will contact me with any questions or concerns. Thank you for the opportunity to participate in the care of Mr. AHMADI. I will keep you updated on his progress.\par \par Plan: KUB x-ray. BMP. Urine cytology. Follow up in 3 months for cystoscopy.

## 2024-08-19 ENCOUNTER — PREP FOR PROCEDURE (OUTPATIENT)
Dept: SURGERY | Facility: CLINIC | Age: 64
End: 2024-08-19

## 2024-08-19 DIAGNOSIS — K40.90 INGUINAL HERNIA: ICD-10-CM

## 2024-08-19 DIAGNOSIS — F41.9 ANXIETY: ICD-10-CM

## 2024-08-19 DIAGNOSIS — K43.2 INCISIONAL HERNIA: Primary | ICD-10-CM

## 2024-08-19 RX ORDER — LORAZEPAM 2 MG/1
2 TABLET ORAL EVERY 8 HOURS PRN
Qty: 90 TABLET | Refills: 0 | Status: CANCELLED | OUTPATIENT
Start: 2024-08-19

## 2024-08-19 NOTE — TELEPHONE ENCOUNTER
Patient called in to check on her refill status as her pharmacy is unable to refill and she is totally out of med. Kindly update the patient by an return call. Thanks

## 2024-08-19 NOTE — TELEPHONE ENCOUNTER
Patient called back and stated she has 2 refills at the pharmacy so she would like this request cancelled

## 2024-08-20 RX ORDER — LORAZEPAM 2 MG/1
2 TABLET ORAL EVERY 8 HOURS PRN
Qty: 90 TABLET | Refills: 0 | Status: SHIPPED | OUTPATIENT
Start: 2024-08-20

## 2024-08-22 ENCOUNTER — PATIENT OUTREACH (OUTPATIENT)
Dept: CASE MANAGEMENT | Facility: OTHER | Age: 64
End: 2024-08-22

## 2024-08-22 ENCOUNTER — TELEPHONE (OUTPATIENT)
Age: 64
End: 2024-08-22

## 2024-08-22 ENCOUNTER — TELEPHONE (OUTPATIENT)
Dept: SURGERY | Facility: CLINIC | Age: 64
End: 2024-08-22

## 2024-08-22 DIAGNOSIS — M54.16 LUMBAR RADICULOPATHY: ICD-10-CM

## 2024-08-22 DIAGNOSIS — F41.9 ANXIETY: ICD-10-CM

## 2024-08-22 DIAGNOSIS — Z90.49 S/P SMALL BOWEL RESECTION: ICD-10-CM

## 2024-08-22 NOTE — PROGRESS NOTES
Contacted patient for f/u.  She is having nausea and vomiting since last pm.  She is taking phenergan every 6 hours.  She was able to take morning medications but has not had anything orally.  She also c/o abdominal pain.  She did see general surgeon last week for evaluation on hernia repair.  She will attempt to lose more weight prior to having surgery.  Instructed her to rest and monitor her symptoms.  If things worsen instructed her to contact PCP or go to urgent center.  She was agreeable.  Will follow up with her in the next two weeks.  Encouraged her to call with any concerns.

## 2024-08-22 NOTE — TELEPHONE ENCOUNTER
Dr Recinos said she would do pt's surgery on 9/11 (Wed). Sussy LEVINE, remote , scheduled the surgery date, but hadn't called the pt, so I called her to let her know. Pt was happy that she would be having the surgery before her [facility] program expires on 9/20. Advised her the hospital will call her with the time the day before after 3 pm and up to 9 pm. Pt confirmed she had received the red surgery folder and the soap she needed to use. AMY Rodriguez, confirmed the washing instructions for that. Pt said she understood.

## 2024-08-22 NOTE — TELEPHONE ENCOUNTER
Dr Recinos said she would do pt's surgery on 9/11 (Wed). Sussy LEVINE, remote , scheduled the surgery date, but hadn't called the pt, so I called her to let her know. Pt was happy that she would be having the surgery before her [facility]ullahi program expires on 9/20. Advised her the hospital will call her with the time the day before after 3 pm and up to 9 pm. Pt confirmed she had received the red surgery folder and the soap she needed to use. AMY Rodriguez, confirmed the washing instructions for that. Pt said she understood.     NOTE:  Pt said she had just begun vomiting on 8/21 and wasn't sure if this was a virus, or something relating to one of her health issues.

## 2024-08-22 NOTE — TELEPHONE ENCOUNTER
Patient is requesting a refill of Tramadol but she would like to know if it can be prescribed to take every 6 hours. Pt states she's been in so much pain and throwing up and does not have enough to last her 3 days.    If this can be filled can her request be sent to [facility] on [facility]

## 2024-08-23 ENCOUNTER — TELEPHONE (OUTPATIENT)
Age: 64
End: 2024-08-23

## 2024-08-23 RX ORDER — TRAMADOL HYDROCHLORIDE 50 MG/1
100 TABLET ORAL EVERY 8 HOURS PRN
Qty: 180 TABLET | Refills: 0 | Status: SHIPPED | OUTPATIENT
Start: 2024-08-23

## 2024-08-23 NOTE — TELEPHONE ENCOUNTER
Pharmacy called and advised that patient was at the pharmacy looking to  her tramadol, is patient able to get a refill on this medication still with the same instructions of every 8 hours since he denied changing it to every 6 hours. Please advise

## 2024-08-23 NOTE — TELEPHONE ENCOUNTER
Patient called in regards to needing a refill on the tramadol and would like to know if provider can increase the dosage or if she can take 2 an a half pills or possibly 3 pills every 8 hours. Patient stated that she is in a lot of pain and she won't be getting surgery until 9/11. Patient would like a call back   no

## 2024-08-25 ENCOUNTER — NURSE TRIAGE (OUTPATIENT)
Dept: OTHER | Facility: OTHER | Age: 64
End: 2024-08-25

## 2024-08-25 NOTE — TELEPHONE ENCOUNTER
"Regarding: robotic surgery  ----- Message from Lulú FERNANDEZ sent at 8/25/2024  1:35 PM EDT -----  \"I am suppose to be having robotic surgery in September and would like to talk to someone about the procedure\"    "

## 2024-08-25 NOTE — TELEPHONE ENCOUNTER
"Reason for Disposition   [1] Caller requesting NON-URGENT health information AND [2] PCP's office is the best resource    Answer Assessment - Initial Assessment Questions  1. REASON FOR CALL or QUESTION: \"What is your reason for calling today?\" or \"How can I best help you?\" or \"What question do you have that I can help answer?\"      Patient is scheduled for robotic surgery for her hernias and reports that she has questions about the procedure that she would like more information on.    Protocols used: Information Only Call-ADULT-      Please follow up with the patient regarding her questions on upcoming surgical procedure.  "

## 2024-08-26 ENCOUNTER — NURSE TRIAGE (OUTPATIENT)
Age: 64
End: 2024-08-26

## 2024-08-26 NOTE — TELEPHONE ENCOUNTER
Spoke with patient and discussed any concerns she may have. She is going to call her Cardiologist to confirm how long to stop her Eliquis,.

## 2024-08-27 NOTE — TELEPHONE ENCOUNTER
"Patient inquiring when to hold Eliquis prior to hernia surgery September 11th. Vascular approved a 5 day hold for aspirin.    Please advise.[phone]        Reason for Disposition   Caller has NON-URGENT medicine question about med that PCP or specialist prescribed and triager unable to answer question    Answer Assessment - Initial Assessment Questions  1. NAME of MEDICATION: \"What medicine are you calling about?\"      Eliquis  2. QUESTION: \"What is your question?\" (e.g., medication refill, side effect)      How long should I hold the Eliquis prior to hernia repair surgery scheduled September 11th?  3. PRESCRIBING HCP: \"Who prescribed it?\" Reason: if prescribed by specialist, call should be referred to that group.      Dr Lynn     Have hernia repair surgery scheduled for September 11th. When should I hold the Eliquis?    Protocols used: Medication Question Call-ADULT-[place]    "
Per Dr. Lynn's ov note from 7/18/2024, patient to hold Eliquis for 2 days prior to procedure.     Placed call to patient. She verbalized understanding and has no further questions or concerns at this time.   
Pt called stating that she wrote down when to hold eliquis and aspirin however she miss-placed the paper.     Advised per instructions below. Pt verbalized understanding.       Per telephone note on 122:  JUD Rg   to [facility]       7/18/24  4:47 PM  Note     Yes, Ok to hold aspirin for 5 days prior to her procedures        Per note with Dr. Lynn on 7/18:    Suspect low to moderate cardiac risk for hernia surgery. Okay to hold Eliquis 2 days prior.   
Cigarettes

## 2024-09-03 DIAGNOSIS — K21.9 GASTROESOPHAGEAL REFLUX DISEASE WITHOUT ESOPHAGITIS: ICD-10-CM

## 2024-09-03 RX ORDER — CHLORDIAZEPOXIDE HYDROCHLORIDE AND CLIDINIUM BROMIDE 5; 2.5 MG/1; MG/1
1 CAPSULE ORAL DAILY PRN
Qty: 90 CAPSULE | Refills: 0 | OUTPATIENT
Start: 2024-09-03

## 2024-09-04 ENCOUNTER — TELEPHONE (OUTPATIENT)
Age: 64
End: 2024-09-04

## 2024-09-04 NOTE — TELEPHONE ENCOUNTER
Spoke with patient and eased her mine.   Advised her of  Departure with  so if she would want to reschedule in the future it would be with either  or with a new provider in our office and she was ok. Patient thanked us for everything and we will be cancelling all appointments,

## 2024-09-04 NOTE — TELEPHONE ENCOUNTER
Patient calling in and has a request to speak with Dr. Recinos or Jennifer in regards to they surgery     Patient is very nervous about the surgery and does not think now that she would like to have it and is very sorry     Patient was very upset on the phone  Please call back

## 2024-09-09 ENCOUNTER — PATIENT OUTREACH (OUTPATIENT)
Dept: CASE MANAGEMENT | Facility: OTHER | Age: 64
End: 2024-09-09

## 2024-09-11 ENCOUNTER — TELEPHONE (OUTPATIENT)
Age: 64
End: 2024-09-11

## 2024-09-11 DIAGNOSIS — N30.10 INTERSTITIAL CYSTITIS: Primary | ICD-10-CM

## 2024-09-11 RX ORDER — HYDROXYZINE HYDROCHLORIDE 25 MG/1
50 TABLET, FILM COATED ORAL
Qty: 180 TABLET | Refills: 3 | Status: SHIPPED | OUTPATIENT
Start: 2024-09-11

## 2024-09-11 NOTE — TELEPHONE ENCOUNTER
Please let patient know I sent a prescription for hydroxyzine 50 mg at bedtime.  She should not take this in combination with Allegra.

## 2024-09-11 NOTE — TELEPHONE ENCOUNTER
Patient called to see if her Urologist can re-prescribe this medication to her. She would like it sent to [id number] - [place] - [address]

## 2024-09-12 ENCOUNTER — TELEPHONE (OUTPATIENT)
Age: 64
End: 2024-09-12

## 2024-09-12 DIAGNOSIS — K21.9 GASTROESOPHAGEAL REFLUX DISEASE WITHOUT ESOPHAGITIS: ICD-10-CM

## 2024-09-12 DIAGNOSIS — I10 ESSENTIAL HYPERTENSION: ICD-10-CM

## 2024-09-12 DIAGNOSIS — I10 BENIGN ESSENTIAL HYPERTENSION: ICD-10-CM

## 2024-09-12 RX ORDER — FUROSEMIDE 40 MG
40 TABLET ORAL DAILY
Qty: 90 TABLET | Refills: 1 | Status: SHIPPED | OUTPATIENT
Start: 2024-09-12

## 2024-09-12 RX ORDER — CHLORDIAZEPOXIDE HYDROCHLORIDE AND CLIDINIUM BROMIDE 5; 2.5 MG/1; MG/1
1 CAPSULE ORAL DAILY PRN
Qty: 30 CAPSULE | Refills: 0 | Status: SHIPPED | OUTPATIENT
Start: 2024-09-12

## 2024-09-12 NOTE — TELEPHONE ENCOUNTER
Pt had a surgery, cross section partial removal of small bowel and/or colon. Since the sx her iritalble bowel has been acting up, experiencing lots of cramping. She is asking if we can rx her back to the previous dosage of chlordiazepoxide-clidinium. she was getting it 4 times a day before. Please rx to [facility]. Please mychart message or call back patient with any questions or when its been done. Pt aware Dr. Mega cookt be in until Monday. Can another provider address?

## 2024-09-13 ENCOUNTER — NURSE TRIAGE (OUTPATIENT)
Age: 64
End: 2024-09-13

## 2024-09-13 ENCOUNTER — APPOINTMENT (OUTPATIENT)
Dept: LAB | Facility: HOSPITAL | Age: 64
End: 2024-09-13
Payer: COMMERCIAL

## 2024-09-13 DIAGNOSIS — N39.0 RECURRENT UTI: ICD-10-CM

## 2024-09-13 DIAGNOSIS — N39.0 RECURRENT UTI: Primary | ICD-10-CM

## 2024-09-13 LAB
BACTERIA UR QL AUTO: ABNORMAL /HPF
BILIRUB UR QL STRIP: NEGATIVE
CLARITY UR: CLEAR
COLOR UR: ABNORMAL
GLUCOSE UR STRIP-MCNC: NEGATIVE MG/DL
HGB UR QL STRIP.AUTO: NEGATIVE
HYALINE CASTS #/AREA URNS LPF: ABNORMAL /LPF
KETONES UR STRIP-MCNC: NEGATIVE MG/DL
LEUKOCYTE ESTERASE UR QL STRIP: ABNORMAL
NITRITE UR QL STRIP: POSITIVE
NON-SQ EPI CELLS URNS QL MICRO: ABNORMAL /HPF
PH UR STRIP.AUTO: 6 [PH]
PROT UR STRIP-MCNC: NEGATIVE MG/DL
RBC #/AREA URNS AUTO: ABNORMAL /HPF
SP GR UR STRIP.AUTO: 1.01 (ref 1–1.03)
UROBILINOGEN UR STRIP-ACNC: <2 MG/DL
WBC #/AREA URNS AUTO: ABNORMAL /HPF

## 2024-09-13 PROCEDURE — 81001 URINALYSIS AUTO W/SCOPE: CPT

## 2024-09-13 PROCEDURE — 87086 URINE CULTURE/COLONY COUNT: CPT

## 2024-09-13 NOTE — TELEPHONE ENCOUNTER
Patient called in c/o UTI symptoms. States starting yesterday, foul smelling cloudy urine, urgency, burning, abdominal pressure. Denies fever, denies hematuria. Reviewed hydration, ED precautions, ordered urine testing. Patient is on low dose keflex 250mg daily.       Answer Questions - Initial Assessment  When did your symptoms start: yesterday    Is burning with every void: yes    Do you have any other urinary symptoms, urinary frequency, urgency, incontinence: yes    Have you become unable to urinate: No: It feels like I am retaining urine even though I am able to urinate    Have you seen blood in your urine: no    Do you have any abdominal pain or flank pain: yes, abdomen    Do you have a fever of 101 or higher: no    Do you have a history of urinary tract infections: Yes    Have you had any recent urine testing: yes    Have you had a recent urologic procedure or surgery: yes cystoscopy 7/30    Protocols used: Dysuria

## 2024-09-14 LAB — BACTERIA UR CULT: NORMAL

## 2024-09-16 ENCOUNTER — TELEPHONE (OUTPATIENT)
Dept: UROLOGY | Facility: CLINIC | Age: 64
End: 2024-09-16

## 2024-09-16 NOTE — RESULT ENCOUNTER NOTE
Please let patient know that her urine culture is negative for infection.  No need for antibiotics.
all other ROS negative except as per HPI

## 2024-09-17 ENCOUNTER — PATIENT OUTREACH (OUTPATIENT)
Dept: CASE MANAGEMENT | Facility: OTHER | Age: 64
End: 2024-09-17

## 2024-09-17 DIAGNOSIS — N30.10 INTERSTITIAL CYSTITIS: ICD-10-CM

## 2024-09-17 RX ORDER — PHENAZOPYRIDINE HYDROCHLORIDE 200 MG/1
200 TABLET, FILM COATED ORAL 2 TIMES DAILY PRN
Qty: 60 TABLET | Refills: 5 | Status: SHIPPED | OUTPATIENT
Start: 2024-09-17

## 2024-09-17 NOTE — PROGRESS NOTES
Contacted patient for f/u.  She reports she decided to not pursue hernia surgery at this time due to risk.  She is working on losing weight, eating healthy and exercising daily.  She is taking daily walks and hopes to advance to two walks per day.  She reports a good appetite and  is working on making healthy choices in her diet.   She does continues to experience nausea and vomiting at times.  She uses phenergan as needed.  She takes all medications as prescribed.  Appointments are scheduled and she denies transportation issues.  She denies needing help at home.  She feels she is managing at this time with no needs present.  Will close to complex care management at this time.

## 2024-09-18 DIAGNOSIS — F41.9 ANXIETY: ICD-10-CM

## 2024-09-18 RX ORDER — LORAZEPAM 2 MG/1
2 TABLET ORAL EVERY 8 HOURS PRN
Qty: 90 TABLET | Refills: 0 | Status: SHIPPED | OUTPATIENT
Start: 2024-09-18

## 2024-09-19 ENCOUNTER — TELEPHONE (OUTPATIENT)
Dept: FAMILY MEDICINE CLINIC | Facility: CLINIC | Age: 64
End: 2024-09-19

## 2024-09-19 NOTE — TELEPHONE ENCOUNTER
Auth needed for chlodiazepoxide-clidinium 5-2.5mg capsules. Patient is to take 1 capsule daily as needed. Dispense qty 30. Dx: K21.9, K52.9, K55.9    Stable = 7/2021  Tried/Failed: carafate, dexilant, dicyclomine, esomeprazole, famotidine, lansoprazole, linzess, omeprazole 20mg, pantoprazole, sucralfate, zantac.  Combo: omeprazole 40mg.     CMM Key: [id number]  Prescribed by Dr Pratt

## 2024-09-20 ENCOUNTER — TELEPHONE (OUTPATIENT)
Age: 64
End: 2024-09-20

## 2024-09-20 ENCOUNTER — TELEPHONE (OUTPATIENT)
Dept: CARDIOLOGY CLINIC | Facility: CLINIC | Age: 64
End: 2024-09-20

## 2024-09-20 DIAGNOSIS — Z90.49 S/P SMALL BOWEL RESECTION: ICD-10-CM

## 2024-09-20 DIAGNOSIS — F41.9 ANXIETY: ICD-10-CM

## 2024-09-20 DIAGNOSIS — M54.16 LUMBAR RADICULOPATHY: ICD-10-CM

## 2024-09-20 DIAGNOSIS — R11.0 NAUSEA: Primary | ICD-10-CM

## 2024-09-20 RX ORDER — TRAMADOL HYDROCHLORIDE 50 MG/1
100 TABLET ORAL EVERY 8 HOURS PRN
Qty: 180 TABLET | Refills: 0 | Status: SHIPPED | OUTPATIENT
Start: 2024-09-20

## 2024-09-20 RX ORDER — ONDANSETRON 4 MG/1
4 TABLET, ORALLY DISINTEGRATING ORAL EVERY 8 HOURS PRN
Qty: 21 TABLET | Refills: 0 | Status: SHIPPED | OUTPATIENT
Start: 2024-09-20 | End: 2024-09-24

## 2024-09-20 NOTE — TELEPHONE ENCOUNTER
PA for CHLORDIAZEPOXIDE-CLIDINIUM CAP SUBMITTED     via    []CMM-KEY:   []Surescripts-Case ID #   []Faxed to plan   [x]Other website-PromptPA:[id number]    []Phone call Case ID #     Office notes sent, clinical questions answered. Awaiting determination    Turnaround time for your insurance to make a decision on your Prior Authorization can take 7-21 business days.

## 2024-09-20 NOTE — TELEPHONE ENCOUNTER
Phone call to patient.  Per Dr. Lynn  Can you please sign this patient up for Leq[facility]io?  Repatha is no longer covered under her prescription plan.  I spoke with her over the phone and she is agreeable.  Could you reach out to her and have her come in to sign the form so that we can initiate the process?  Thank you.     Asked patient to stop in office to sign [facility] request

## 2024-09-20 NOTE — TELEPHONE ENCOUNTER
Pt. Called and stating she had left over Zofran that she used when her nausea is severe and use that in place of phenergan, pt. Is out of Zofran, asking for a script, sent script, awaiting prior auth since it was 4 mg disintegrating and needs a prior auth    Ondansetron 4mg, one tablet PO as needed every 8 hours, quantity of 21 total pills, with no refills

## 2024-09-21 NOTE — TELEPHONE ENCOUNTER
PA for CHLORDIAZEPOXIDE-CLIDINIUM  APPROVED     Date(s) approved 9/20/24 TO 12/31/24    Case #    Patient advised by          [][employer] Message  [x]Phone call   []LMOM  []L/M to call office as no active Communication consent on file  []Unable to leave detailed message as VM not approved on Communication consent       Pharmacy advised by    [x]Fax  [x]Phone call    Approval letter scanned into Media Yes

## 2024-09-22 ENCOUNTER — RA CDI HCC (OUTPATIENT)
Dept: OTHER | Facility: HOSPITAL | Age: 64
End: 2024-09-22

## 2024-09-23 NOTE — PROGRESS NOTES
HCC coding opportunities          Chart Reviewed number of suggestions sent to Provider: 1  I13.0     Patients Insurance     Medicare Insurance: [employer]

## 2024-09-24 RX ORDER — ONDANSETRON 4 MG/1
4 TABLET, ORALLY DISINTEGRATING ORAL EVERY 8 HOURS PRN
Qty: 21 TABLET | Refills: 3 | Status: SHIPPED | OUTPATIENT
Start: 2024-09-24

## 2024-09-25 ENCOUNTER — RA CDI HCC (OUTPATIENT)
Dept: OTHER | Facility: HOSPITAL | Age: 64
End: 2024-09-25

## 2024-09-29 ENCOUNTER — HOSPITAL ENCOUNTER (OUTPATIENT)
Dept: RADIOLOGY | Facility: HOSPITAL | Age: 64
Discharge: HOME | End: 2024-09-29
Payer: COMMERCIAL

## 2024-09-29 DIAGNOSIS — K21.9 GASTROESOPHAGEAL REFLUX DISEASE WITHOUT ESOPHAGITIS: ICD-10-CM

## 2024-09-29 DIAGNOSIS — M54.16 LUMBAR RADICULOPATHY: ICD-10-CM

## 2024-09-29 PROCEDURE — 72072 X-RAY EXAM THORAC SPINE 3VWS: CPT

## 2024-09-29 PROCEDURE — 72110 X-RAY EXAM L-2 SPINE 4/>VWS: CPT

## 2024-09-30 DIAGNOSIS — I48.0 PAROXYSMAL ATRIAL FIBRILLATION (HCC): ICD-10-CM

## 2024-09-30 RX ORDER — CHLORDIAZEPOXIDE HYDROCHLORIDE AND CLIDINIUM BROMIDE 5; 2.5 MG/1; MG/1
CAPSULE ORAL
Qty: 30 CAPSULE | Refills: 0 | Status: SHIPPED | OUTPATIENT
Start: 2024-09-30 | End: 2024-10-03 | Stop reason: SDUPTHER

## 2024-09-30 NOTE — TELEPHONE ENCOUNTER
Reason for call:   [x] Refill   [] Prior Auth  [] Other:     Office:   [] PCP/Provider -   [x] Specialty/Provider - [facility]     Medication: amiodarone 100 mg tablet     Dose/Frequency: 100 mg, Daily     Quantity: 90    Pharmacy: [id number]    Does the patient have enough for 3 days?   [x] Yes   [] No - Send as HP to POD

## 2024-10-01 ENCOUNTER — TELEPHONE (OUTPATIENT)
Age: 64
End: 2024-10-01

## 2024-10-01 RX ORDER — AMIODARONE HYDROCHLORIDE 100 MG/1
100 TABLET ORAL DAILY
Qty: 90 TABLET | Refills: 0 | Status: SHIPPED | OUTPATIENT
Start: 2024-10-01

## 2024-10-01 NOTE — TELEPHONE ENCOUNTER
----- Message from Coy Ayoub DO sent at 10/1/2024 12:10 PM EDT -----  Lumbar x-ray shows no acute fracture.  Patient does have some degenerative changes/arthritis

## 2024-10-02 ENCOUNTER — RA CDI HCC (OUTPATIENT)
Dept: OTHER | Facility: HOSPITAL | Age: 64
End: 2024-10-02

## 2024-10-02 ENCOUNTER — TELEPHONE (OUTPATIENT)
Age: 64
End: 2024-10-02

## 2024-10-02 NOTE — TELEPHONE ENCOUNTER
I called back patient because I had rescheduled patient appointment in error. Provided new date and time of 1/8/2025 at 9:20 am with Dr. Saha in [place]. Patient confirmed and varbalized understanding.

## 2024-10-02 NOTE — TELEPHONE ENCOUNTER
Received call from patient to confirm appointment of 1/2/25 at 9:20 am with Dr. Saha in [place]. Patient is aware of [place] location.     Patient expressed concern that the automatic phonecall was confirming an appointment that was already rescheduled. Explained to patient that previous appointment on 10/7/24 in [place] at 10:40 am with Yifan was rescheduled and to please disregard that message.     Patient verbalized understanding.

## 2024-10-03 ENCOUNTER — OFFICE VISIT (OUTPATIENT)
Age: 64
End: 2024-10-03
Payer: COMMERCIAL

## 2024-10-03 VITALS
TEMPERATURE: 97.3 F | BODY MASS INDEX: 33.8 KG/M2 | HEART RATE: 63 BPM | DIASTOLIC BLOOD PRESSURE: 82 MMHG | WEIGHT: 198 LBS | HEIGHT: 64 IN | SYSTOLIC BLOOD PRESSURE: 118 MMHG | OXYGEN SATURATION: 98 %

## 2024-10-03 DIAGNOSIS — K21.9 GASTROESOPHAGEAL REFLUX DISEASE WITHOUT ESOPHAGITIS: ICD-10-CM

## 2024-10-03 DIAGNOSIS — I48.91 ATRIAL FIBRILLATION, UNSPECIFIED TYPE (HCC): Primary | ICD-10-CM

## 2024-10-03 DIAGNOSIS — K43.9 VENTRAL HERNIA WITHOUT OBSTRUCTION OR GANGRENE: ICD-10-CM

## 2024-10-03 DIAGNOSIS — I10 ESSENTIAL HYPERTENSION: ICD-10-CM

## 2024-10-03 PROCEDURE — 99214 OFFICE O/P EST MOD 30 MIN: CPT | Performed by: FAMILY MEDICINE

## 2024-10-03 PROCEDURE — G2211 COMPLEX E/M VISIT ADD ON: HCPCS | Performed by: FAMILY MEDICINE

## 2024-10-03 RX ORDER — CHLORDIAZEPOXIDE HYDROCHLORIDE AND CLIDINIUM BROMIDE 5; 2.5 MG/1; MG/1
1 CAPSULE ORAL 2 TIMES DAILY PRN
Qty: 60 CAPSULE | Refills: 2 | Status: SHIPPED | OUTPATIENT
Start: 2024-10-03

## 2024-10-03 NOTE — PROGRESS NOTES
Assessment/Plan: Laboratory studies reviewed.  Patient will continue with current treatment for interstitial cystitis.  Patient not candidate for ventral hernia surgery at this time.  This was discussed with surgeon.  Blood pressure stable at this time.  X-ray thoracic spine lumbar spine reviewed.  Refills given.  Follow-up in 2 months       Diagnoses and all orders for this visit:    Atrial fibrillation, unspecified type (HCC)    Essential hypertension    Ventral hernia without obstruction or gangrene    Gastroesophageal reflux disease without esophagitis  -     chlordiazepoxide-clidinium (LIBRAX) 5-2.5 mg per capsule; Take 1 capsule by mouth 2 (two) times a day as needed for indigestion            Subjective:        Patient ID: Nancy Jones is a 64 y.o. female.      Patient here to follow-up on hypertension A-fib with history of ventral hernia.  Patient to see surgeon.  Patient not going for surgery.  Urination defecation stable.  No chest pain.  Patient does get some shortness of breath.  Patient status post fall last Friday.  Patient does have some pain in the right scalp.  No loss of conscious.          The following portions of the patient's history were reviewed and updated as appropriate: allergies, current medications, past family history, past medical history, past social history, past surgical history and problem list.      Review of Systems   Constitutional: Negative.    HENT: Negative.     Eyes: Negative.    Respiratory: Negative.     Cardiovascular: Negative.    Gastrointestinal:  Positive for abdominal pain.   Endocrine: Negative.    Genitourinary: Negative.    Musculoskeletal:  Positive for arthralgias.   Skin: Negative.    Allergic/Immunologic: Negative.    Neurological:  Positive for headaches.   Hematological: Negative.    Psychiatric/Behavioral: Negative.             Objective:               /82 (BP Location: Left arm, Patient Position: Sitting, Cuff Size: Standard)   Pulse 63   Temp (!)  "97.3 °F (36.3 °C) (Temporal)   Ht 5' 4\" (1.626 m)   Wt 89.8 kg (198 lb)   LMP  (LMP Unknown)   SpO2 98%   BMI 33.99 kg/m²          Physical Exam  Vitals and nursing note reviewed.   Constitutional:       General: She is not in acute distress.     Appearance: Normal appearance. She is not ill-appearing, toxic-appearing or diaphoretic.   HENT:      Head: Normocephalic and atraumatic.      Right Ear: Tympanic membrane, ear canal and external ear normal. There is no impacted cerumen.      Left Ear: Tympanic membrane, ear canal and external ear normal. There is no impacted cerumen.      Nose: Nose normal. No congestion or rhinorrhea.      Mouth/Throat:      Mouth: Mucous membranes are moist.      Pharynx: No oropharyngeal exudate or posterior oropharyngeal erythema.   Eyes:      General: No scleral icterus.        Right eye: No discharge.         Left eye: No discharge.   Neck:      Vascular: No carotid bruit.   Cardiovascular:      Rate and Rhythm: Normal rate and regular rhythm.      Pulses: Normal pulses.      Heart sounds: Normal heart sounds. No murmur heard.     No friction rub. No gallop.   Pulmonary:      Effort: Pulmonary effort is normal. No respiratory distress.      Breath sounds: Normal breath sounds. No stridor. No wheezing, rhonchi or rales.   Chest:      Chest wall: No tenderness.   Abdominal:      General: There is no distension.      Tenderness: There is abdominal tenderness. There is no guarding or rebound.      Hernia: A hernia is present.   Musculoskeletal:         General: No swelling, tenderness, deformity or signs of injury. Normal range of motion.      Cervical back: Normal range of motion and neck supple. No rigidity. No muscular tenderness.      Right lower leg: No edema.      Left lower leg: No edema.   Lymphadenopathy:      Cervical: No cervical adenopathy.   Skin:     General: Skin is warm and dry.      Capillary Refill: Capillary refill takes less than 2 seconds.      Coloration: Skin " is not jaundiced.      Findings: No bruising, erythema, lesion or rash.   Neurological:      Mental Status: She is alert and oriented to person, place, and time. Mental status is at baseline.      Cranial Nerves: No cranial nerve deficit.      Sensory: No sensory deficit.      Motor: No weakness.      Coordination: Coordination normal.      Gait: Gait normal.   Psychiatric:         Mood and Affect: Mood normal.         Behavior: Behavior normal.         Thought Content: Thought content normal.         Judgment: Judgment normal.

## 2024-10-07 ENCOUNTER — TELEPHONE (OUTPATIENT)
Age: 64
End: 2024-10-07

## 2024-10-07 NOTE — TELEPHONE ENCOUNTER
[facility] calls to report medication interaction between Amiodarone prescribed by Dr. Lynn and hydroxyzine that is prescribed by another provider.  It can cause QT prolongation.    Please advise.    Pharmacy call back # [phone]

## 2024-10-07 NOTE — TELEPHONE ENCOUNTER
[facility] pharmacy was contacted and they are now aware of Dr. Lynn's Response. They have no other questions or concerns at this time.

## 2024-10-07 NOTE — TELEPHONE ENCOUNTER
Please request pharmacy to call the prescribing provider and request something other than hydroxyzine.  Thank you.    CARLITA   Yes

## 2024-10-08 DIAGNOSIS — L30.9 DERMATITIS: ICD-10-CM

## 2024-10-09 RX ORDER — TRIAMCINOLONE ACETONIDE 1 MG/G
CREAM TOPICAL 2 TIMES DAILY
Qty: 15 G | Refills: 2 | Status: SHIPPED | OUTPATIENT
Start: 2024-10-09 | End: 2024-10-09 | Stop reason: ALTCHOICE

## 2024-10-09 RX ORDER — TRIAMCINOLONE ACETONIDE 1 MG/G
OINTMENT TOPICAL 2 TIMES DAILY
Qty: 15 G | Refills: 2 | Status: SHIPPED | OUTPATIENT
Start: 2024-10-09

## 2024-10-10 ENCOUNTER — TELEPHONE (OUTPATIENT)
Age: 64
End: 2024-10-10

## 2024-10-10 NOTE — TELEPHONE ENCOUNTER
Patient called in regards to wanting to know if provider had any recommendation for an eye doctor due to the pressure in her left eye getting worst, patient would like a call back.

## 2024-10-11 ENCOUNTER — PATIENT MESSAGE (OUTPATIENT)
Age: 64
End: 2024-10-11

## 2024-10-11 DIAGNOSIS — H57.9 EYE PRESSURE: Primary | ICD-10-CM

## 2024-10-11 NOTE — TELEPHONE ENCOUNTER
Message sent to pt that a referral was placed to  [facility].  I gave her the # to call for an appt

## 2024-10-17 DIAGNOSIS — M54.16 LUMBAR RADICULOPATHY: ICD-10-CM

## 2024-10-17 DIAGNOSIS — Z90.49 S/P SMALL BOWEL RESECTION: ICD-10-CM

## 2024-10-17 DIAGNOSIS — F41.9 ANXIETY: ICD-10-CM

## 2024-10-18 ENCOUNTER — TELEPHONE (OUTPATIENT)
Age: 64
End: 2024-10-18

## 2024-10-18 RX ORDER — TRAMADOL HYDROCHLORIDE 50 MG/1
100 TABLET ORAL EVERY 8 HOURS PRN
Qty: 180 TABLET | Refills: 0 | Status: SHIPPED | OUTPATIENT
Start: 2024-10-18

## 2024-10-18 RX ORDER — LORAZEPAM 2 MG/1
2 TABLET ORAL EVERY 8 HOURS PRN
Qty: 90 TABLET | Refills: 0 | Status: SHIPPED | OUTPATIENT
Start: 2024-10-18

## 2024-10-18 NOTE — TELEPHONE ENCOUNTER
Last visit 10/03/2024  Next appt 12/03/2024    Patient stated that when she went to her appt at [facility] in [place] with Dr Pope that she was informed that she has a concussion and that she should contact Dr Ayoub to check if the patient should be sent for an MRI and/or any other tests. Patient stated that their office will be faxing the letter with their findings. Thank you

## 2024-10-21 ENCOUNTER — PATIENT MESSAGE (OUTPATIENT)
Age: 64
End: 2024-10-21

## 2024-10-21 NOTE — TELEPHONE ENCOUNTER
Left message for patient to call back to advise her to reach out to eye dr for appropriate testing, message also sent via [employer].

## 2024-10-22 ENCOUNTER — OFFICE VISIT (OUTPATIENT)
Dept: CARDIOLOGY CLINIC | Facility: CLINIC | Age: 64
End: 2024-10-22
Payer: COMMERCIAL

## 2024-10-22 VITALS
HEART RATE: 57 BPM | BODY MASS INDEX: 34.15 KG/M2 | HEIGHT: 64 IN | SYSTOLIC BLOOD PRESSURE: 120 MMHG | WEIGHT: 200 LBS | DIASTOLIC BLOOD PRESSURE: 86 MMHG

## 2024-10-22 DIAGNOSIS — E78.5 DYSLIPIDEMIA: ICD-10-CM

## 2024-10-22 DIAGNOSIS — I48.91 ATRIAL FIBRILLATION, UNSPECIFIED TYPE (HCC): Primary | ICD-10-CM

## 2024-10-22 DIAGNOSIS — I10 ESSENTIAL HYPERTENSION: ICD-10-CM

## 2024-10-22 PROCEDURE — 99214 OFFICE O/P EST MOD 30 MIN: CPT | Performed by: INTERNAL MEDICINE

## 2024-10-22 PROCEDURE — 93000 ELECTROCARDIOGRAM COMPLETE: CPT | Performed by: INTERNAL MEDICINE

## 2024-10-22 NOTE — PROGRESS NOTES
"      Cardiology             Nancy Jones  1960  [id number]                Assessment/Plan:     Paroxysmal atrial fibrillation, diagnosed on Holter monitor 03/2020, with recurrence during episode of GI bleed 7/2023, now on amiodarone suppression, maintained on Eliquis anticoagulation  Hypertension  Multiple drug intolerances and possible allergies  Probable heterozygous familial hypercholesterolemia, on Repatha  Mild to moderate aortic regurgitation  Obesity   Sleep apnea, compliant with CPAP therapy   Lower extremity edema, on furosemide, resolved              Rare palpitations.  Continue metoprolol, amiodarone, Eliquis anticoagulation.  ECG revealing sinus bradycardia 57 bpm with QTc interval 455 ms.  Blood pressure controlled, continue spironolactone, metoprolol  Repatha no longer covered by prescription plan.  Leqvio was too expensive.  Will check helix genetic testing which will include FH genetic test.  Patient definitely agreeable and is interested.  Fortunately, recent LDL cholesterol significantly proved at 101 mg/dL.  Heartedly diet, exercise, weight loss is recommended  Patient on aspirin since TCAR/2024         Follow-up with me in 4 months after repeat lipid panel             Interval History:      This is a 64-year-old female diagnosed with paroxysmal atrial fibrillation on Holter monitoring 3/020.  That time echocardiogram had revealed mild-to-moderate aortic regurgitation with normal left ventricular systolic function.  Nuclear stress test June 2020 was unremarkable with normal perfusion.     She has hypertension, and has multiple medications listed as allergies including beta-blockers, although she has been taking metoprolol for quite some time.  Although on her allergy list HCTZ states “throat closing,\" she states that actually cause some ankle swelling along with amlodipine.  Atorvastatin caused joint pain, although it is listed in her allergies also as “throat closing. ”  She seems to have " had a true allergic reaction to hydralazine and valsartan.  She states valsartan caused angioedema, and she does not remember what happened with hydralazine, although states she thinks it may have been ankle swelling.     She was last seen by Dr. Ray 01/28/2021 at which time she was maintained on metoprolol succinate and spironolactone for hypertension.  At 1 point she was asked to take diltiazem as needed for palpitations.  She has been maintained on Eliquis anticoagulation.       She underwent a Zio monitor 03/2021 at [facility](should be scanned into media section, personally reviewed rhythm strips) revealing normal sinus rhythm with 8 runs of SVT, longest lasting 17 beats.  There were 18 patient triggers, most of which correlated with PACs, short atrial runs, and PVCs.  There was no evidence of atrial fibrillation.     Nuclear stress test 06/2020 was unremarkable     In August 2021 she was hospitalized at [facility] for epigastric and left upper quadrant pain, admitted for superior mesenteric artery thrombosis.  She received heparin drip, and underwent SMA stenting with IR on 08/19/2021.  She was initiated on Plavix, and continued on Eliquis.     During her prior visit 10/15/2021 she was initiated on diltiazem which she later stopped due to headache and nausea.  Subsequent nifedipine was tried which caused headache and nausea as well which she stopped on her own.  Verapamil caused increase in palpitations.     She went to [facility] ER due to urinary retention and was reported to be in atrial fibrillation.  ZIO monitor 10/2022 revealed episodes of paroxysmal atrial fibrillation which correlated with her triggered symptoms.  Echocardiogram 12/16/2022 demonstrated normal left ejection fraction of 55% with mild mitral and aortic regurgitation.  Nuclear stress test 1/17/2023 demonstrated no evidence of myocardial ischemia.  She was seen by electrophysiology and was initiated on flecainide which she did  "not tolerate well due to side effects.  She refused ablation, therefore was placed on diltiazem.     On 1/31/2023 she underwent exploration of left carotid artery for CEA, and this was aborted, as the left carotid bifurcation was high and deep in the neck with the hypoglossal nerve overriding the bifurcation.  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     Echocardiogram 2/21/2023 demonstrated left ejection fraction 60% with mild right ventricular dilatation and mild to moderate aortic regurgitation.     During her prior visit 3/20/2023, diltiazem was increased from 120 mg to 180 mg daily in light of recurrent atrial fibrillation with RVR at 111 bpm.  She refused another consultation with electrophysiology.  She was unable to tolerate flecainide due to increased palpitations, therefore was reluctant to try any more medications and refused ablation as well.     She was started on Diamox thereafter by neurology for signs of increased intracranial pressure on MRI.     During Adina's last visit 1/2023 she had complaints of chest discomfort, dyspnea, and palpitations.  Nuclear stress test 1/31/2024 was unremarkable.  Zio monitor 3/6/2024 was within normal limits with symptoms correlating with sinus rhythm only.  Echocardiogram 1/31/2024 demonstrated normal left ventricular systolic function, with ejection fraction 65% with mild aortic regurgitation.     She underwent successful left TCAR on 4/29/2024.    She presents today for follow-up.  Repatha is no longer covered by her prescription plan.  Leqvio was too expensive for her.  From a symptomatic standpoint she feels well.  She feels rare palpitations and lightheadedness with quick positional changes.      Vitals:  Vitals:    10/22/24 0856   BP: 120/86   Pulse: 57   Weight: 90.7 kg (200 lb)   Height: 5' 4\" (1.626 m)         Past Medical History:   Diagnosis Date    " "A-fib ([facility]) 2020    Allergic     Anxiety     Arthritis     Asthma     Back pain     and muscle pain    Bruises easily     Chronic pain disorder     Interstitial pain    Colon polyp     Dental bridge present     Depression     Eczema     GERD (gastroesophageal reflux disease)     Heart murmur     History of blood clots     per pt \"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\"    History of pneumonia     Hives     Hyperlipidemia     Hypertension     Infertility, female     Interstitial cystitis     Migraine     sees neurologist    Motion sickness     Obesity     Palpitations     PONV (postoperative nausea and vomiting)     Premature atrial contractions 2022    Psychiatric disorder     TIA (transient ischemic attack) 2022    per pt --\"had MRI and 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\"    Urinary incontinence     wears Pads    Venous insufficiency 2017    Wears glasses      Social History     Socioeconomic History    Marital status:      Spouse name: Not on file    Number of children: 0    Years of education: Not on file    Highest education level: Not on file   Occupational History    Occupation: retired   Tobacco Use    Smoking status: Former     Current packs/day: 0.00     Average packs/day: 0.5 packs/day for 10.0 years (5.0 ttl pk-yrs)     Types: Cigarettes     Start date:      Quit date: 2019     Years since quittin.8     Passive exposure: Past    Smokeless tobacco: Never   Vaping Use    Vaping status: Never Used   Substance and Sexual Activity    Alcohol use: Not Currently    Drug use: No    Sexual activity: Not Currently     Comment: defer   Other Topics Concern    Not on file   Social History Narrative    Who lives in your home: Alone     What type of home do you live in: Apartment     Age of your home: 1952 built     How long have you been living there: 5 yrs     Type of heat: forced hot air     Type of fuel: electric     What type of jamin is in your bedroom: " carpet jamin     Do you have the following in or near your home:    Air products: Humidifier in the bedroom/kitchen     Pests: none     Pets: none     Are pets allowed in bedroom: N/A     Open fields, wooded areas nearby: No     Basement: [id number]-no mold     Exposure to second hand smoke: No    Central air     Habits:    Caffeine: Hot tea 1 cup daily- ice tea 1 cup in the afternoon    Chocolate: Occasionally      Social Determinants of Health     Financial Resource Strain: Medium Risk (10/2/2023)    Overall Financial Resource Strain (CARDIA)     Difficulty of Paying Living Expenses: Somewhat hard   Food Insecurity: No Food Insecurity (2/21/2023)    Hunger Vital Sign     Worried About Running Out of Food in the Last Year: Never true     Ran Out of Food in the Last Year: Never true   Transportation Needs: No Transportation Needs (10/2/2023)    PRAPARE - Transportation     Lack of Transportation (Medical): No     Lack of Transportation (Non-Medical): No   Physical Activity: Inactive (5/24/2021)    Exercise Vital Sign     Days of Exercise per Week: 0 days     Minutes of Exercise per Session: 0 min   Stress: Not on file   Social Connections: Unknown (6/18/2024)    Received from [employer]     How often do you feel lonely or isolated from those around you? (Adult - for ages 18 years and over): Not on file   Intimate Partner Violence: Not At Risk (5/24/2021)    Humiliation, Afraid, Rape, and Kick questionnaire     Fear of Current or Ex-Partner: No     Emotionally Abused: No     Physically Abused: No     Sexually Abused: No   Housing Stability: Low Risk  (7/20/2023)    Housing Stability Vital Sign     Unable to Pay for Housing in the Last Year: No     Number of Times Moved in the Last Year: 1     Homeless in the Last Year: No      Family History   Problem Relation Age of Onset    Lung cancer Mother 60    Brain cancer Mother 60    Diabetes Father     Depression Father     Other Father          septic    Allergies Sister     Hashimoto's thyroiditis Sister     Abdominal aortic aneurysm Sister     Diabetes Sister     No Known Problems Sister     No Known Problems Maternal Grandmother     No Known Problems Maternal Grandfather     No Known Problems Paternal Grandmother     No Known Problems Paternal Grandfather     Hodgkin's lymphoma Brother     No Known Problems Brother     No Known Problems Maternal Aunt     Diabetes Other     Breast cancer Neg Hx      Past Surgical History:   Procedure Laterality Date    COLONOSCOPY      DILATION AND CURETTAGE OF UTERUS      EGD      EXPLORATORY LAPAROTOMY      for infertility    EXPLORATORY LAPAROTOMY W/ BOWEL RESECTION N/A 7/19/2023    Procedure: LAPAROTOMY EXPLORATORY W/ BOWEL RESECTION;  Surgeon: Crista Recinos MD;  Location: [facility];  Service: General    HAND SURGERY      Hand excision of tendon cyst    IA LAPAROSCOPIC APPENDECTOMY N/A 05/03/2022    Procedure: APPENDECTOMY LAPAROSCOPIC;  Surgeon: Vin Fields MD;  Location: [facility];  Service: General    IA LAPS ABD PRTM&OMENTUM DX W/WO SPEC BR/WA SPX N/A 7/19/2023    Procedure: LAPAROSCOPY DIAGNOSTIC, LYSIS OF ADHESIONS, LAPAROSCOPY TAKE TAKEDOWN OF LEFT COLON, EXPLORATORY LAPAROSCOPY, LYSIS OF ADHESIONS, RESECTION OF TRANSVERSE COLON SPLENIC FLEXURE AND DESCENDING COLON , TAKE DOWN OF SPLENIC FLEXURE, SIGMOIDOSCOPY, MOBILIZATION OF RIGHT COLON, PRIMARY ANASTOMOSIS EEA 29, TAP BLOCK;  Surgeon: Crista Recinos MD;  [facility];  Service: General    IA TCAT IV STENT CRV CRTD ART EMBOLIC PROTECJ Left 4/29/2024    Procedure: ANGIOPLASTY ARTERY CAROTID W/ STENT;  Surgeon: Roberto García DO;  Location[facility];  Service: Vascular    IA TEAEC W/PATCH GRF CAROTID VERTB SUBCLAV NECK INC Left 1/31/2023    Procedure: EXPLORATION OF LEFT CAROTID ARTERY; ABORTED ENDARTERECTOMY ARTERY CAROTID;  Surgeon: Roberto García DO;  Location: [facility];  Service: Vascular    SUPERIOR MESTENTERIC ARTERY  STENT      WISDOM TOOTH EXTRACTION         Current Outpatient Medications:     acetaminophen (TYLENOL) 325 mg tablet, Take 2 tablets (650 mg total) by mouth every 6 (six) hours, Disp: , Rfl: 0    amiodarone 100 mg tablet, Take 1 tablet (100 mg total) by mouth daily, Disp: 90 tablet, Rfl: 0    apixaban (Eliquis) 5 mg, TAKE ONE TABLET BY MOUTH TWICE DAILY, Disp: 180 tablet, Rfl: 1    aspirin (ECOTRIN LOW STRENGTH) 81 mg EC tablet, Take 81 mg by mouth daily, Disp: , Rfl:     cephalexin (KEFLEX) 250 mg capsule, Take 1 capsule (250 mg total) by mouth in the morning, Disp: 90 capsule, Rfl: 1    chlordiazepoxide-clidinium (LIBRAX) 5-2.5 mg per capsule, Take 1 capsule by mouth 2 (two) times a day as needed for indigestion, Disp: 60 capsule, Rfl: 2    Diclofenac Sodium (VOLTAREN) 1 %, Apply 2 g topically 4 (four) times a day, Disp: 100 g, Rfl: 2    diltiazem (CARDIZEM CD) 120 mg 24 hr capsule, TAKE ONE CAPSULE BY MOUTH ONCE DAILY, Disp: 90 capsule, Rfl: 1    Docusate Sodium (COLACE PO), Take by mouth daily at bedtime, Disp: , Rfl:     fluticasone-vilanterol (BREO ELLIPTA) 200-25 MCG/INH inhaler, Inhale 1 puff daily Rinse mouth after use., Disp: 3 Inhaler, Rfl: 3    furosemide (LASIX) 40 mg tablet, Take 1 tablet (40 mg total) by mouth daily, Disp: 90 tablet, Rfl: 1    LORazepam (ATIVAN) 2 mg tablet, Take 1 tablet (2 mg total) by mouth every 8 (eight) hours as needed for anxiety, Disp: 90 tablet, Rfl: 0    methocarbamol (ROBAXIN) 500 mg tablet, Take 2 tablets (1,000 mg total) by mouth 3 (three) times a day, Disp: 180 tablet, Rfl: 5    metoprolol succinate (TOPROL-XL) 100 mg 24 hr tablet, Take 1 tablet (100 mg total) by mouth 2 (two) times a day, Disp: 180 tablet, Rfl: 1    omeprazole (PriLOSEC) 40 MG capsule, Take 1 capsule (40 mg total) by mouth 2 (two) times a day before meals, Disp: 60 capsule, Rfl: 5    ondansetron (ZOFRAN-ODT) 4 mg disintegrating tablet, Take 1 tablet (4 mg total) by mouth every 8 (eight) hours as needed  for nausea, Disp: 21 tablet, Rfl: 3    phenazopyridine (PYRIDIUM) 200 mg tablet, Take 1 tablet (200 mg total) by mouth 2 (two) times a day as needed for bladder spasms, Disp: 60 tablet, Rfl: 5    polyethylene glycol (MIRALAX) 17 g packet, Take 17 g by mouth daily as needed (constipation), Disp: , Rfl:     promethazine (PHENERGAN) 25 mg tablet, Take 1 tablet (25 mg total) by mouth every 6 (six) hours as needed for nausea or vomiting, Disp: 60 tablet, Rfl: 5    senna (SENOKOT) 8.6 mg, Take 1 tablet (8.6 mg total) by mouth daily as needed for constipation, Disp: , Rfl:     spironolactone (ALDACTONE) 25 mg tablet, TAKE ONE TABLET BY MOUTH TWICE DAILY, Disp: 180 tablet, Rfl: 1    traMADol (ULTRAM) 50 mg tablet, Take 2 tablets (100 mg total) by mouth every 8 (eight) hours as needed for moderate pain, Disp: 180 tablet, Rfl: 0    triamcinolone (KENALOG) 0.1 % ointment, Apply topically 2 (two) times a day, Disp: 15 g, Rfl: 2    fexofenadine (ALLEGRA) 180 MG tablet, Take 180 mg by mouth daily (Patient not taking: Reported on 10/22/2024), Disp: , Rfl:     LORazepam (ATIVAN) 1 mg tablet, Take 1 tablet (1 mg total) by mouth 3 (three) times a day (Patient not taking: Reported on 10/22/2024), Disp: 90 tablet, Rfl: 0    magnesium Oxide (MAG-OX) 400 mg TABS, Take 1 tablet (400 mg total) by mouth daily at bedtime (Patient not taking: Reported on 10/3/2024), Disp: 90 tablet, Rfl: 3    Current Facility-Administered Medications:     cyanocobalamin injection 1,000 mcg, 1,000 mcg, Intramuscular, Q30 Days, Coy Ayoub DO, 1,000 mcg at 11/16/23 1151        Review of Systems:  Review of Systems   Constitutional:  Negative for activity change, fever and unexpected weight change.   HENT:  Negative for facial swelling, nosebleeds and voice change.    Respiratory:  Negative for chest tightness, shortness of breath and wheezing.    Cardiovascular:  Negative for chest pain, palpitations and leg swelling.   Gastrointestinal:  Negative for  abdominal distention.   Genitourinary:  Negative for hematuria.   Musculoskeletal:  Negative for arthralgias.   Skin:  Negative for color change, pallor, rash and wound.   Neurological:  Positive for headaches. Negative for dizziness, seizures and syncope.   Psychiatric/Behavioral:  Negative for agitation.          Physical Exam:  Physical Exam  Vitals reviewed.   Constitutional:       Appearance: She is well-developed. She is obese.   HENT:      Head: Normocephalic and atraumatic.   Cardiovascular:      Rate and Rhythm: Normal rate and regular rhythm.      Heart sounds: Normal heart sounds.   Pulmonary:      Effort: Pulmonary effort is normal.      Breath sounds: Normal breath sounds.   Abdominal:      Palpations: Abdomen is soft.   Musculoskeletal:         General: Normal range of motion.      Cervical back: Normal range of motion and neck supple.   Skin:     General: Skin is warm and dry.   Neurological:      Mental Status: She is alert and oriented to person, place, and time.   Psychiatric:         Behavior: Behavior normal.         Thought Content: Thought content normal.         Judgment: Judgment normal.       This note was completed in part utilizing [employer].  Grammatical errors, random word insertions, spelling mistakes, and incomplete sentences can be an occasional consequence of this system secondary to software limitations, ambient noise, and hardware issues.  If you have any questions or concerns about the content, text, or information contained within the body of this dictation, please contact the provider for clarification.

## 2024-10-28 ENCOUNTER — TELEPHONE (OUTPATIENT)
Dept: UROLOGY | Facility: CLINIC | Age: 64
End: 2024-10-28

## 2024-10-28 DIAGNOSIS — N30.10 INTERSTITIAL CYSTITIS: Primary | ICD-10-CM

## 2024-10-28 NOTE — TELEPHONE ENCOUNTER
Patient called Rx refill line and would like to try something else for her bladder- she is currently on the pyridium and thinks its causing her stomach to having burning - please call pt back with med recommendations

## 2024-10-29 ENCOUNTER — APPOINTMENT (OUTPATIENT)
Dept: RADIOLOGY | Facility: HOSPITAL | Age: 64
End: 2024-10-29
Payer: COMMERCIAL

## 2024-10-29 ENCOUNTER — HOSPITAL ENCOUNTER (OUTPATIENT)
Dept: MRI IMAGING | Facility: HOSPITAL | Age: 64
Discharge: HOME | End: 2024-10-29
Payer: COMMERCIAL

## 2024-10-29 DIAGNOSIS — H51.11 CONVERGENCE INSUFFICIENCY: ICD-10-CM

## 2024-10-29 PROCEDURE — 70551 MRI BRAIN STEM W/O DYE: CPT

## 2024-10-29 NOTE — TELEPHONE ENCOUNTER
I think what we are dealing here with, after reading the results of her last cystoscopy by me in July 2024-if she has neuropathic pain.  She may benefit from gabapentin.  I would recommend starting gabapentin 100 mg p.o. 3 times daily.  I do not see that on her med list.

## 2024-10-30 RX ORDER — GABAPENTIN 100 MG/1
100 CAPSULE ORAL 3 TIMES DAILY
Qty: 90 CAPSULE | Refills: 3 | Status: SHIPPED | OUTPATIENT
Start: 2024-10-30

## 2024-10-30 NOTE — TELEPHONE ENCOUNTER
Called and left a detailed VM for patient with Dr. Hobson's recommendations for Gabapentin 100 mg three times a day. Advised that a script was sent to her pharmacy and to contact the office with any questions.

## 2024-10-30 NOTE — TELEPHONE ENCOUNTER
I reviewed with Dr. Hobson and he suggested trying gabapentin 100 mg 3 times a day instead of Pyridium.  I did send a prescription to patient's pharmacy.

## 2024-11-01 DIAGNOSIS — I10 BENIGN ESSENTIAL HYPERTENSION: ICD-10-CM

## 2024-11-01 RX ORDER — SPIRONOLACTONE 25 MG/1
TABLET ORAL
Qty: 180 TABLET | Refills: 1 | Status: SHIPPED | OUTPATIENT
Start: 2024-11-01 | End: 2024-11-04 | Stop reason: SDUPTHER

## 2024-11-04 DIAGNOSIS — I10 BENIGN ESSENTIAL HYPERTENSION: ICD-10-CM

## 2024-11-04 DIAGNOSIS — I48.0 PAROXYSMAL ATRIAL FIBRILLATION (HCC): ICD-10-CM

## 2024-11-04 NOTE — TELEPHONE ENCOUNTER
Reason for call:   [x] Refill-patient now using [facility]. Needs Spironolactone transferred and needs refill for diltiazem  [] Prior Auth  [] Other:     Office:   [x] PCP/Provider - [facility] / Coy Ayoub,   [] Specialty/Provider -     diltiazem (CARDIZEM CD) 120 mg 24 hr capsule TAKE ONE CAPSULE BY MOUTH ONCE DAILY     spironolactone (ALDACTONE) 25 mg tablet TAKE ONE TABLET BY MOUTH TWICE DAILY     Pharmacy: [id number] - [place] [address]     Does the patient have enough for 3 days?   [x] Yes   [] No - Send as HP to POD

## 2024-11-04 NOTE — TELEPHONE ENCOUNTER
Recommend trying Keflex and pyridium again with food for short trial. If symptoms return we can then consider Uribel.

## 2024-11-04 NOTE — TELEPHONE ENCOUNTER
"Patient called in. Patient received VM regarding stopping Pyridium and starting Gabapentin. Patient is very hesitant to try Gabapentin and was curious about a medication Uribel. Willing to try a short Rx to see if that helps. In discussing with patient the symptoms that she has been having, she did report that the GI \"burning\" she was experiencing has calmed down since stopping the Pyridium, of note, patient also stopped the low dose Cephalexin at same time. Review with patient how and when she was taking and she reports she was not taking Keflex or Pyridium with food, but was taking first thing in morning with all of her other meds. She is now wondering if it was ABX causing this and maybe she should try taking with food. Patient looking for advisement on next steps, does not want Gabapentin, willing to try Uribel, but would only want a prescription for maybe 10 days or so to try d/t cost, or if MD advised will try Keflex and Pyridum again with food. Please advise.   "

## 2024-11-04 NOTE — TELEPHONE ENCOUNTER
Contacted patient and advised for her to try Keflex and pyridium with food to see if this helps with the GI issues. Patient will try this and if symptoms do not improve she will contact the office to discuss Uribel.

## 2024-11-05 ENCOUNTER — TELEPHONE (OUTPATIENT)
Age: 64
End: 2024-11-05

## 2024-11-05 DIAGNOSIS — G43.001 MIGRAINE WITHOUT AURA AND WITH STATUS MIGRAINOSUS, NOT INTRACTABLE: Primary | ICD-10-CM

## 2024-11-05 RX ORDER — DILTIAZEM HYDROCHLORIDE 120 MG/1
120 CAPSULE, COATED, EXTENDED RELEASE ORAL DAILY
Qty: 90 CAPSULE | Refills: 1 | Status: SHIPPED | OUTPATIENT
Start: 2024-11-05 | End: 2024-11-07 | Stop reason: SDUPTHER

## 2024-11-05 RX ORDER — SPIRONOLACTONE 25 MG/1
25 TABLET ORAL 2 TIMES DAILY
Qty: 180 TABLET | Refills: 1 | Status: SHIPPED | OUTPATIENT
Start: 2024-11-05

## 2024-11-05 NOTE — TELEPHONE ENCOUNTER
Patient called the RX Refill Line. Message is being forwarded to the office.     Patient is requesting a rx medication for a horrible headache that she has. Pt states Dr Ayoub used to give her something a long time ago for a headache. Pt states she recently had an MRI done.    butalbital-acetaminophen-caffeine (FIORICET,ESGIC) -40 mg per tablet [[id number]     Pharmacy: [id number]  [place], [place] - [address]      Please contact patient if [phone]

## 2024-11-06 RX ORDER — BUTALBITAL, ACETAMINOPHEN AND CAFFEINE 50; 325; 40 MG/1; MG/1; MG/1
1 TABLET ORAL EVERY 4 HOURS PRN
Qty: 20 TABLET | Refills: 0 | Status: SHIPPED | OUTPATIENT
Start: 2024-11-06 | End: 2024-11-15 | Stop reason: SDUPTHER

## 2024-11-06 NOTE — TELEPHONE ENCOUNTER
Coy Ayoub, DO to [facility]  55 minutes ago  Fiorcet 1 pill every 6 hours as needed for headache.#20 [id number]

## 2024-11-07 DIAGNOSIS — I48.0 PAROXYSMAL ATRIAL FIBRILLATION (HCC): ICD-10-CM

## 2024-11-07 RX ORDER — DILTIAZEM HYDROCHLORIDE 120 MG/1
120 CAPSULE, COATED, EXTENDED RELEASE ORAL DAILY
Qty: 90 CAPSULE | Refills: 1 | Status: SHIPPED | OUTPATIENT
Start: 2024-11-07

## 2024-11-07 RX ORDER — DILTIAZEM HYDROCHLORIDE 120 MG/1
120 CAPSULE, COATED, EXTENDED RELEASE ORAL DAILY
Qty: 90 CAPSULE | Refills: 0 | OUTPATIENT
Start: 2024-11-07

## 2024-11-07 NOTE — TELEPHONE ENCOUNTER
Pharmacy called and advised that there is a Major interaction between the Fioricet and diltiazem and states that it can decrease the concentration of the diltiazem. Please advise

## 2024-11-07 NOTE — TELEPHONE ENCOUNTER
Patient called in regard to the butalbital-acetaminophen-caffeine (FIORICET,ESGIC) -40 mg per tablet. Patient advised that she would like someone to call [facility] back in regards to this medication.

## 2024-11-08 RX ORDER — METHYLPREDNISOLONE 4 MG/1
TABLET ORAL
Qty: 21 EACH | Refills: 0 | Status: SHIPPED | OUTPATIENT
Start: 2024-11-08 | End: 2024-11-12

## 2024-11-08 NOTE — TELEPHONE ENCOUNTER
Patient called again and is suffering with a really bad migraine. I see Firocet was discontinued due to interaction but is there something you want to order in the place of this medication. Please assist    Thank you

## 2024-11-10 DIAGNOSIS — M25.561 ARTHRALGIA OF BOTH KNEES: ICD-10-CM

## 2024-11-10 DIAGNOSIS — M25.562 ARTHRALGIA OF BOTH KNEES: ICD-10-CM

## 2024-11-10 DIAGNOSIS — I10 ESSENTIAL HYPERTENSION: ICD-10-CM

## 2024-11-11 RX ORDER — METOPROLOL SUCCINATE 100 MG/1
100 TABLET, EXTENDED RELEASE ORAL 2 TIMES DAILY
Qty: 180 TABLET | Refills: 1 | Status: SHIPPED | OUTPATIENT
Start: 2024-11-11

## 2024-11-12 ENCOUNTER — TELEPHONE (OUTPATIENT)
Age: 64
End: 2024-11-12

## 2024-11-12 ENCOUNTER — OFFICE VISIT (OUTPATIENT)
Age: 64
End: 2024-11-12
Payer: COMMERCIAL

## 2024-11-12 VITALS
BODY MASS INDEX: 35.34 KG/M2 | SYSTOLIC BLOOD PRESSURE: 132 MMHG | WEIGHT: 207 LBS | DIASTOLIC BLOOD PRESSURE: 98 MMHG | HEART RATE: 68 BPM | OXYGEN SATURATION: 93 % | HEIGHT: 64 IN | TEMPERATURE: 97.4 F

## 2024-11-12 DIAGNOSIS — I10 BENIGN ESSENTIAL HYPERTENSION: ICD-10-CM

## 2024-11-12 DIAGNOSIS — L73.9 FOLLICULITIS: ICD-10-CM

## 2024-11-12 DIAGNOSIS — I10 ESSENTIAL HYPERTENSION: ICD-10-CM

## 2024-11-12 DIAGNOSIS — N30.00 ACUTE CYSTITIS WITHOUT HEMATURIA: Primary | ICD-10-CM

## 2024-11-12 PROCEDURE — G2211 COMPLEX E/M VISIT ADD ON: HCPCS | Performed by: FAMILY MEDICINE

## 2024-11-12 PROCEDURE — 99214 OFFICE O/P EST MOD 30 MIN: CPT | Performed by: FAMILY MEDICINE

## 2024-11-12 RX ORDER — LEVOFLOXACIN 500 MG/1
500 TABLET, FILM COATED ORAL EVERY 24 HOURS
Qty: 7 TABLET | Refills: 0 | Status: SHIPPED | OUTPATIENT
Start: 2024-11-12 | End: 2024-11-19

## 2024-11-12 RX ORDER — FUROSEMIDE 40 MG/1
40 TABLET ORAL DAILY
Qty: 90 TABLET | Refills: 1 | Status: SHIPPED | OUTPATIENT
Start: 2024-11-12

## 2024-11-12 NOTE — TELEPHONE ENCOUNTER
Pharmacist calls to report that the levofloxacin interacts with amiodarone. It can cause QT prolongation. Please call the pharmacy @ [phone] to let them know if it ok to fill the script.

## 2024-11-12 NOTE — PROGRESS NOTES
Assessment/Plan: Patient is Levaquin as directed for folliculitis as well as possible cystitis without hematuria.  Patient will continue with Lasix for hypertension.  Other guidance given at this time.  Patient will follow-up per routine or as needed       Diagnoses and all orders for this visit:    Folliculitis  -     levofloxacin (LEVAQUIN) 500 mg tablet; Take 1 tablet (500 mg total) by mouth every 24 hours for 7 days    Acute cystitis without hematuria  -     levofloxacin (LEVAQUIN) 500 mg tablet; Take 1 tablet (500 mg total) by mouth every 24 hours for 7 days    Benign essential hypertension  -     furosemide (LASIX) 40 mg tablet; Take 1 tablet (40 mg total) by mouth daily    Essential hypertension  -     furosemide (LASIX) 40 mg tablet; Take 1 tablet (40 mg total) by mouth daily            Subjective:        Patient ID: Nancy Jones is a 64 y.o. female.      Patient is here with some bladder discomfort as well as decreased urination status post adding Medrol Dosepak.  With some left flank pain and decreased urination.  No hematuria.  Some lesions noted in the suprapubic region.  Patient will also need refills for Lasix for hypertension.  No chest pain or shortness of breath.    Rash          The following portions of the patient's history were reviewed and updated as appropriate: allergies, current medications, past family history, past medical history, past social history, past surgical history and problem list.      Review of Systems   Constitutional: Negative.    HENT: Negative.     Eyes: Negative.    Respiratory: Negative.     Cardiovascular: Negative.    Gastrointestinal: Negative.    Endocrine: Negative.    Genitourinary:  Positive for difficulty urinating and flank pain.   Skin:  Positive for rash.   Allergic/Immunologic: Negative.    Neurological: Negative.    Hematological: Negative.    Psychiatric/Behavioral: Negative.             Objective:               /98 (BP Location: Right arm, Patient  "Position: Sitting, Cuff Size: Large)   Pulse 68   Temp (!) 97.4 °F (36.3 °C) (Temporal)   Ht 5' 4\" (1.626 m)   Wt 93.9 kg (207 lb)   LMP  (LMP Unknown)   SpO2 93%   BMI 35.53 kg/m²          Physical Exam  Vitals and nursing note reviewed. Exam conducted with a chaperone present.   Constitutional:       General: She is not in acute distress.     Appearance: Normal appearance. She is not ill-appearing, toxic-appearing or diaphoretic.   HENT:      Head: Normocephalic and atraumatic.      Right Ear: Tympanic membrane, ear canal and external ear normal. There is no impacted cerumen.      Left Ear: Tympanic membrane, ear canal and external ear normal. There is no impacted cerumen.      Nose: Nose normal. No congestion or rhinorrhea.      Mouth/Throat:      Mouth: Mucous membranes are moist.      Pharynx: No oropharyngeal exudate or posterior oropharyngeal erythema.   Eyes:      General: No scleral icterus.        Right eye: No discharge.         Left eye: No discharge.   Neck:      Vascular: No carotid bruit.   Cardiovascular:      Rate and Rhythm: Normal rate and regular rhythm.      Pulses: Normal pulses.      Heart sounds: Normal heart sounds. No murmur heard.     No friction rub. No gallop.   Pulmonary:      Effort: Pulmonary effort is normal. No respiratory distress.      Breath sounds: Normal breath sounds. No stridor. No wheezing, rhonchi or rales.   Chest:      Chest wall: No tenderness.   Musculoskeletal:         General: No swelling, tenderness, deformity or signs of injury. Normal range of motion.      Cervical back: Normal range of motion and neck supple. No rigidity. No muscular tenderness.      Right lower leg: No edema.      Left lower leg: No edema.   Lymphadenopathy:      Cervical: No cervical adenopathy.   Skin:     General: Skin is warm and dry.      Capillary Refill: Capillary refill takes less than 2 seconds.      Coloration: Skin is not jaundiced.      Findings: Lesion present. No bruising, " erythema or rash.      Comments: Folliculitis suprapubic region.  Nurse in room for visit   Neurological:      General: No focal deficit present.      Mental Status: She is alert and oriented to person, place, and time.      Cranial Nerves: No cranial nerve deficit.      Sensory: No sensory deficit.      Motor: No weakness.      Coordination: Coordination normal.      Gait: Gait normal.   Psychiatric:         Mood and Affect: Mood normal.         Behavior: Behavior normal.         Thought Content: Thought content normal.         Judgment: Judgment normal.          normal...

## 2024-11-12 NOTE — TELEPHONE ENCOUNTER
Patient called in regards to starting the methylprednisolone yesterday. Patient stated that she is not urinating normal her stream is weak and has hives in private area which she noticed this morning. Patient also stated that she still has a headache and would like to know if she can stop taking medication and get prescribe something different. Patient would also like to know if its okay for her to take benadryl.

## 2024-11-13 ENCOUNTER — NURSE TRIAGE (OUTPATIENT)
Age: 64
End: 2024-11-13

## 2024-11-13 ENCOUNTER — TELEPHONE (OUTPATIENT)
Age: 64
End: 2024-11-13

## 2024-11-13 DIAGNOSIS — N30.00 ACUTE CYSTITIS WITHOUT HEMATURIA: Primary | ICD-10-CM

## 2024-11-13 NOTE — TELEPHONE ENCOUNTER
"Reason for Conversation: received call from pt. She calls asking if she can stop taking her Amiodarone for a certain amount of time.  She reports having symptoms start 2 days ago.  She has belly pain, a rash all over body, and new rash in genital and back area.  She also reports having difficulty going to the bathroom, has to use miralax.  She saw PCP yesterday for symptoms and he would like to prescribe Levofloxacin, but it interacts with Amiodarone.  Pt states she read side effects of Amiodarone and is asking her symptoms could be from that instead of something else.     VS/Weight: (Note: Please include date/time vitals/weight were measured)  PCP office yesterday /98 pulse 68 weight 207 lbs    Pain: No    Risk Factors: Hypertension, Arrhythmia, and Other multiple drug intolerances, aortic regurgitation    Recent relevant testing and date of testing: Other ecg  10/22    Medication: lasix 40 mg qd, metoprolol succinate 100 mg bid, diltiazem 120 mg qd, aldactone 25 mg bid, amiodarone 100 mg qd, eliquis 5 mg bid     Upcoming Office Visit: Yes    Last Office Visit: 10/22/24      Reason for Disposition   Caller has NON-URGENT medicine question about med that PCP or specialist prescribed and triager unable to answer question    Answer Assessment - Initial Assessment Questions  1. NAME of MEDICINE: \"What medicine(s) are you calling about?\"      Amiodarone   2. QUESTION: \"What is your question?\" (e.g., double dose of medicine, side effect)      Can she stop it for a period of time, believes she is having side effects. Interacts with a medication PCP wants to prescribed  3. PRESCRIBER: \"Who prescribed the medicine?\" Reason: if prescribed by specialist, call should be referred to that group.      Dr. Lynn  4. SYMPTOMS: \"Do you have any symptoms?\" If Yes, ask: \"What symptoms are you having?\"  \"How bad are the symptoms (e.g., mild, moderate, severe)      Itching, rash on body, trouble using bathroom    Protocols used: " Medication Question Call-Adult-[place]

## 2024-11-13 NOTE — TELEPHONE ENCOUNTER
Per encounter-    JUD Mtz to [facility]  [facility] [place] [facility]   11/13/24  2:23 PM  OK to schedule with Mary in [place] on Tuesday AM or Wednesday when she is with Dr. Saha, or Mary/Tiffanie at  on Thursday or Friday, for rash on her groin area that has spread down her leg.       Called pt to schedule but n/a, left v/m for pt to c/b to schedule an appt per the note above.

## 2024-11-13 NOTE — TELEPHONE ENCOUNTER
Patient has a rash on her groin area that has spread down her leg. Patient says she was prescribed Methylprednisolone and Amiodarone which have not helped. Patient scheduled for an appointment on 1/8/25.

## 2024-11-13 NOTE — TELEPHONE ENCOUNTER
Patient having a rash or hives and is on thighs causing pain.    Had seen PCP and was given a medication but it does not agree with her.      Looked at schedules and soonest for providers is January

## 2024-11-14 DIAGNOSIS — R11.0 NAUSEA: ICD-10-CM

## 2024-11-14 RX ORDER — CEFDINIR 300 MG/1
300 CAPSULE ORAL EVERY 12 HOURS SCHEDULED
Qty: 14 CAPSULE | Refills: 0 | Status: SHIPPED | OUTPATIENT
Start: 2024-11-14 | End: 2024-11-21

## 2024-11-15 ENCOUNTER — TELEPHONE (OUTPATIENT)
Age: 64
End: 2024-11-15

## 2024-11-15 DIAGNOSIS — F41.9 ANXIETY: ICD-10-CM

## 2024-11-15 DIAGNOSIS — G43.001 MIGRAINE WITHOUT AURA AND WITH STATUS MIGRAINOSUS, NOT INTRACTABLE: ICD-10-CM

## 2024-11-15 DIAGNOSIS — M54.16 LUMBAR RADICULOPATHY: ICD-10-CM

## 2024-11-15 DIAGNOSIS — Z90.49 S/P SMALL BOWEL RESECTION: ICD-10-CM

## 2024-11-15 RX ORDER — BUTALBITAL, ACETAMINOPHEN AND CAFFEINE 50; 325; 40 MG/1; MG/1; MG/1
1 TABLET ORAL EVERY 4 HOURS PRN
Qty: 20 TABLET | Refills: 0 | Status: SHIPPED | OUTPATIENT
Start: 2024-11-15

## 2024-11-15 RX ORDER — PROMETHAZINE HYDROCHLORIDE 25 MG/1
TABLET ORAL
Qty: 60 TABLET | Refills: 2 | Status: SHIPPED | OUTPATIENT
Start: 2024-11-15

## 2024-11-15 RX ORDER — TRAMADOL HYDROCHLORIDE 50 MG/1
100 TABLET ORAL EVERY 8 HOURS PRN
Qty: 180 TABLET | Refills: 0 | Status: SHIPPED | OUTPATIENT
Start: 2024-11-15

## 2024-11-15 NOTE — TELEPHONE ENCOUNTER
Patient called our office in error, sh meant to call [facility] as she is looking to establish care with them .

## 2024-11-19 ENCOUNTER — OFFICE VISIT (OUTPATIENT)
Age: 64
End: 2024-11-19
Payer: COMMERCIAL

## 2024-11-19 VITALS — TEMPERATURE: 97.5 F

## 2024-11-19 DIAGNOSIS — L30.9 DERMATITIS: Primary | ICD-10-CM

## 2024-11-19 PROCEDURE — 87798 DETECT AGENT NOS DNA AMP: CPT

## 2024-11-19 PROCEDURE — 87529 HSV DNA AMP PROBE: CPT

## 2024-11-19 PROCEDURE — 99204 OFFICE O/P NEW MOD 45 MIN: CPT

## 2024-11-19 RX ORDER — VALACYCLOVIR HYDROCHLORIDE 1 G/1
1000 TABLET, FILM COATED ORAL 3 TIMES DAILY
Qty: 21 TABLET | Refills: 0 | Status: SHIPPED | OUTPATIENT
Start: 2024-11-19 | End: 2024-11-21

## 2024-11-19 NOTE — PROGRESS NOTES
"[facility] Note     Patient Name: Nancy Jones  Encounter Date: 11/19/24     Have you been cared for by a [facility] Dermatologist in the last 3 years and, if so, which description applies to you?    Yes.  I have been here within the last 3 years, and my medical history has NOT changed since that time.  I am FEMALE/of child-bearing potential.    REVIEW OF SYSTEMS:  Have you recently had or currently have any of the following? No changes in my recent health.   PAST MEDICAL HISTORY:  Have you personally ever had or currently have any of the following?  If \"YES,\" then please provide more detail. No changes in my medical history.   HISTORY OF IMMUNOSUPPRESSION: Do you have a history of any of the following:  Systemic Immunosuppression such as Diabetes, Biologic or Immunotherapy, Chemotherapy, Organ Transplantation, Bone Marrow Transplantation or Prednisone?  YES, has taken prednisone     Answering \"YES\" requires the addition of the dotphrase \"IMMUNOSUPPRESSED\" as the first diagnosis of the patient's visit.   FAMILY HISTORY:  Any \"first degree relatives\" (parent, brother, sister, or child) with the following?    No changes in my family's known health.   PATIENT EXPERIENCE:    Do you want the Dermatologist to perform a COMPLETE skin exam today including a clinical examination under the \"bra and underwear\" areas?  NO  If necessary, do we have your permission to call and leave a detailed message on your Preferred Phone number that includes your specific medical information?  Yes      Allergies   Allergen Reactions    Ace Inhibitors Angioedema and Anaphylaxis     Anaphylaxis    Benicar [Olmesartan] Angioedema     See Allergy note from 9/11/2008.  Swollen ankles/legs    Hydralazine Other (See Comments)     Lip swelling  Flank pain    Other Anaphylaxis and Other (See Comments)     Preservatives- itching throat closes, hi  E Z Cat scan contrast - hives throat closes itching,  Preservatives- itching throat " "closes, hi  Artificial sweeteners    Valsartan Angioedema     Lips, face swollen    Ampicillin Hives     Depends on brand some preservatives can react. Has recently tolerated oral amoxicillin.    Sulfa Antibiotics Hives     stuffiness,itching,hives,throat closing--per pt \"sometimes able to take depending on the brand and the filler or possibly preservatives in it\"    Motrin [Ibuprofen] Other (See Comments)     Per pt \" told not to take due to A Fib\"    Wound Dressing Adhesive Other (See Comments)     Per pt Adhesive on EKG leads caused redness      Current Outpatient Medications:     acetaminophen (TYLENOL) 325 mg tablet, Take 2 tablets (650 mg total) by mouth every 6 (six) hours, Disp: , Rfl: 0    apixaban (Eliquis) 5 mg, TAKE ONE TABLET BY MOUTH TWICE DAILY, Disp: 180 tablet, Rfl: 1    butalbital-acetaminophen-caffeine (FIORICET,ESGIC) -40 mg per tablet, Take 1 tablet by mouth every 4 (four) hours as needed for headaches, Disp: 20 tablet, Rfl: 0    cefdinir (OMNICEF) 300 mg capsule, Take 1 capsule (300 mg total) by mouth every 12 (twelve) hours for 7 days, Disp: 14 capsule, Rfl: 0    chlordiazepoxide-clidinium (LIBRAX) 5-2.5 mg per capsule, Take 1 capsule by mouth 2 (two) times a day as needed for indigestion, Disp: 60 capsule, Rfl: 2    Diclofenac Sodium (VOLTAREN) 1 %, Apply 2 g topically 4 (four) times a day, Disp: 100 g, Rfl: 1    diltiazem (CARDIZEM CD) 120 mg 24 hr capsule, Take 1 capsule (120 mg total) by mouth daily, Disp: 90 capsule, Rfl: 1    Docusate Sodium (COLACE PO), Take by mouth daily at bedtime, Disp: , Rfl:     fluticasone-vilanterol (BREO ELLIPTA) 200-25 MCG/INH inhaler, Inhale 1 puff daily Rinse mouth after use., Disp: 3 Inhaler, Rfl: 3    furosemide (LASIX) 40 mg tablet, Take 1 tablet (40 mg total) by mouth daily, Disp: 90 tablet, Rfl: 1    levofloxacin (LEVAQUIN) 500 mg tablet, Take 1 tablet (500 mg total) by mouth every 24 hours for 7 days, Disp: 7 tablet, Rfl: 0    LORazepam (ATIVAN) " 2 mg tablet, Take 1 tablet (2 mg total) by mouth every 8 (eight) hours as needed for anxiety, Disp: 90 tablet, Rfl: 0    methocarbamol (ROBAXIN) 500 mg tablet, Take 2 tablets (1,000 mg total) by mouth 3 (three) times a day, Disp: 180 tablet, Rfl: 5    metoprolol succinate (TOPROL-XL) 100 mg 24 hr tablet, Take 1 tablet (100 mg total) by mouth 2 (two) times a day, Disp: 180 tablet, Rfl: 1    omeprazole (PriLOSEC) 40 MG capsule, Take 1 capsule (40 mg total) by mouth 2 (two) times a day before meals, Disp: 60 capsule, Rfl: 5    ondansetron (ZOFRAN-ODT) 4 mg disintegrating tablet, Take 1 tablet (4 mg total) by mouth every 8 (eight) hours as needed for nausea, Disp: 21 tablet, Rfl: 3    phenazopyridine (PYRIDIUM) 200 mg tablet, Take 1 tablet (200 mg total) by mouth 2 (two) times a day as needed for bladder spasms, Disp: 60 tablet, Rfl: 5    polyethylene glycol (MIRALAX) 17 g packet, Take 17 g by mouth daily as needed (constipation), Disp: , Rfl:     promethazine (PHENERGAN) 25 mg tablet, TAKE ONE TABLET BY MOUTH EVERY 6 HOURS AS NEEDED FOR NAUSEA AND/OR VOMITING, Disp: 60 tablet, Rfl: 2    senna (SENOKOT) 8.6 mg, Take 1 tablet (8.6 mg total) by mouth daily as needed for constipation, Disp: , Rfl:     spironolactone (ALDACTONE) 25 mg tablet, Take 1 tablet (25 mg total) by mouth 2 (two) times a day, Disp: 180 tablet, Rfl: 1    traMADol (ULTRAM) 50 mg tablet, Take 2 tablets (100 mg total) by mouth every 8 (eight) hours as needed for moderate pain, Disp: 180 tablet, Rfl: 0    triamcinolone (KENALOG) 0.1 % ointment, Apply topically 2 (two) times a day, Disp: 15 g, Rfl: 2    amiodarone 100 mg tablet, Take 1 tablet (100 mg total) by mouth daily (Patient not taking: Reported on 11/19/2024), Disp: 90 tablet, Rfl: 0    fexofenadine (ALLEGRA) 180 MG tablet, Take 180 mg by mouth daily (Patient not taking: Reported on 10/22/2024), Disp: , Rfl:     gabapentin (Neurontin) 100 mg capsule, Take 1 capsule (100 mg total) by mouth 3 (three)  times a day, Disp: 90 capsule, Rfl: 3    LORazepam (ATIVAN) 1 mg tablet, Take 1 tablet (1 mg total) by mouth 3 (three) times a day (Patient not taking: Reported on 10/22/2024), Disp: 90 tablet, Rfl: 0    magnesium Oxide (MAG-OX) 400 mg TABS, Take 1 tablet (400 mg total) by mouth daily at bedtime (Patient not taking: Reported on 10/3/2024), Disp: 90 tablet, Rfl: 3    Current Facility-Administered Medications:     cyanocobalamin injection 1,000 mcg, 1,000 mcg, Intramuscular, Q30 Days, oCy Ayoub DO, 1,000 mcg at 11/16/23 1151          Whom besides the patient is providing clinical information about today's encounter?   NO ADDITIONAL HISTORIAN (patient alone provided history)    Physical Exam and Assessment/Plan by Diagnosis:    DERMATITIS- LIKELY SHINGLES, HERPES SIMPLEX VIRUS LESS LIKELY     Physical Exam:  (Anatomic Location); (Size and Morphological Description); (Differential Diagnosis):  Left upper thigh and groin, shallow erosions, active clusters of clear fluid vesicles on an erythematous base     Additional History of Present Condition:  Patient presents for an evaluation of a new rash that began 1 week ago. She reports she had 2 bumps in her groin and the rash soon spread to her left upper thigh. She notes pain but cannot state if its a burning sensation. She was diagnosed with folliculitis and given a Medrol dose pack. Patient completed one day of the medication then stopped it as she was unable to urinate. Patient thinks she may have had chicken pox as a child. She did not get the Shingles vaccine.     Assessment and Plan:  Based on a thorough discussion of this condition and the management approach to it (including a comprehensive discussion of the known risks, side effects and potential benefits of treatment), the patient (family) agrees to implement the following specific plan:  Physical exam and history consistent with Shingles virus, although Herpes Simplex virus cannot be ruled out.   Viral swabs  performed in office today.   Varicella Zoster Virus DNA, PCR  HSV TYPE 1, 2 DNA PCR  Patient started on Valaciclovir (VALTREX) 1000 mg three times daily for seven days. Dosing discussed with Dr. Saha given patients history of Stage 3 Chronic Kidney Disease. Per recent labs creatinine is 1.02 and eGFR is 57, therefore normal dosing stands.   Follow-up: as needed.     Scribe Attestation      I,:  Alana Weinstein am acting as a scribe while in the presence of the attending physician.:       I,:  Mary Jung PA-C personally performed the services described in this documentation    as scribed in my presence.:

## 2024-11-20 NOTE — TELEPHONE ENCOUNTER
Clinical team, please let patient know that she can temporarily hold amiodarone while she is on the antibiotic.  We can observe how her heart rhythm does off amiodarone for now, thank you

## 2024-11-20 NOTE — TELEPHONE ENCOUNTER
Rec'd call from patient will like for Kathie to contact her regarding the appointment from yesterday will like to discuss further information with her.    Please Advise

## 2024-11-20 NOTE — TELEPHONE ENCOUNTER
Her after visit noted that she had 2 bumps  in or around her groin area and she is stating she never discuss that with you will like for you to change that wording in her office visit with you yesterday

## 2024-11-20 NOTE — TELEPHONE ENCOUNTER
Contact was made with  Karen and she is now aware of Dr. Lynn's Response. Patient verbally states that she understands and no questions or concerns at this time.

## 2024-11-21 ENCOUNTER — OFFICE VISIT (OUTPATIENT)
Age: 64
End: 2024-11-21
Payer: COMMERCIAL

## 2024-11-21 VITALS
TEMPERATURE: 97.8 F | BODY MASS INDEX: 33.97 KG/M2 | DIASTOLIC BLOOD PRESSURE: 100 MMHG | HEIGHT: 64 IN | SYSTOLIC BLOOD PRESSURE: 140 MMHG | OXYGEN SATURATION: 96 % | HEART RATE: 62 BPM | WEIGHT: 199 LBS

## 2024-11-21 DIAGNOSIS — E08.49 DIABETES DUE TO UNDRL CONDITION W OTH DIABETIC NEURO COMP (HCC): ICD-10-CM

## 2024-11-21 DIAGNOSIS — B02.9 HERPES ZOSTER WITHOUT COMPLICATION: Primary | ICD-10-CM

## 2024-11-21 PROCEDURE — 99213 OFFICE O/P EST LOW 20 MIN: CPT | Performed by: FAMILY MEDICINE

## 2024-11-21 PROCEDURE — G2211 COMPLEX E/M VISIT ADD ON: HCPCS | Performed by: FAMILY MEDICINE

## 2024-11-21 RX ORDER — FAMCICLOVIR 500 MG/1
500 TABLET ORAL 3 TIMES DAILY
Qty: 21 TABLET | Refills: 0 | Status: SHIPPED | OUTPATIENT
Start: 2024-11-21 | End: 2024-11-28

## 2024-11-21 NOTE — PROGRESS NOTES
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 female.      Patient is here with rash on left thigh anteriorly which is painful.  Patient placed on Valtrex.  Patient unable to tolerate after 1 dose.    Rash          The following portions of the patient's history were reviewed and updated as appropriate: allergies, current medications, past family history, past medical history, past social history, past surgical history and problem list.      Review of Systems   Skin:  Positive for color change and rash.           Objective:               /100 (BP Location: Left arm, Patient Position: Sitting, Cuff Size: Large)   Pulse 62   Temp 97.8 °F (36.6 °C) (Temporal)   Ht 5' 4\" (1.626 m)   Wt 90.3 kg (199 lb)   LMP  (LMP Unknown)   SpO2 96%   BMI 34.16 kg/m²          Physical Exam  Vitals and nursing note reviewed.   Constitutional:       Appearance: Normal appearance.   Skin:     Findings: Rash present.      Comments: Rash consistent with shingles   Neurological:      Mental Status: She is alert.         "

## 2024-11-22 DIAGNOSIS — I48.0 PAROXYSMAL ATRIAL FIBRILLATION (HCC): ICD-10-CM

## 2024-11-23 ENCOUNTER — RA CDI HCC (OUTPATIENT)
Dept: OTHER | Facility: HOSPITAL | Age: 64
End: 2024-11-23

## 2024-11-23 LAB — VZV DNA SPEC QL NAA+PROBE: POSITIVE

## 2024-11-23 NOTE — PROGRESS NOTES
HCC coding opportunities          Chart Reviewed number of suggestions sent to Provider: 3  E11.22, E11.51, I13.0     Patients Insurance     Medicare Insurance: [employer]age

## 2024-11-26 ENCOUNTER — TELEPHONE (OUTPATIENT)
Age: 64
End: 2024-11-26

## 2024-11-26 ENCOUNTER — RESULTS FOLLOW-UP (OUTPATIENT)
Age: 64
End: 2024-11-26

## 2024-11-26 DIAGNOSIS — F41.9 ANXIETY: ICD-10-CM

## 2024-11-26 DIAGNOSIS — I48.0 PAROXYSMAL ATRIAL FIBRILLATION (HCC): ICD-10-CM

## 2024-11-26 LAB
HSV1 DNA SPEC QL NAA+PROBE: NEGATIVE
HSV2 DNA SPEC QL NAA+PROBE: NEGATIVE

## 2024-11-26 NOTE — TELEPHONE ENCOUNTER
Pt contacted Call Center requested refill of their medication.        ** Patient states that she was sent a script for this and it was generic. She would like to know if a new script can be sent stating BRAND NECESSARY.       Doctor Name: Shane       Medication Name: Eliquis       Dosage of Med: 5mg       Frequency of Med: take 1 tablet by mouth 2 times daily       Remaining Medication: 20 pills       Pharmacy and Location: [facility]. Preferred Callback Phone Number: [phone]      Thank you.

## 2024-11-26 NOTE — RESULT ENCOUNTER NOTE
Results reviewed which were positive for VZV on PCR. HSV 1 and 2 were negative. Patient is already on a 7 day course of Valacyclovir. Recommend patient complete course if she has not done so already. Called patient and left detailed message regarding results and plan after confirming patient identity on voicemail message.

## 2024-11-26 NOTE — TELEPHONE ENCOUNTER
Patient called stating the shingles are very itchy and very irritating to her. Patient would like to know if Dr. Ayuob can recommend something prescription or OTC to help stop the itching. Patient is already taking the prescribed medication as directed.     Confirmed pharmacy: [id number] [place] [place] - [address]    Please advise and return patients call.

## 2024-11-27 RX ORDER — LORAZEPAM 2 MG/1
2 TABLET ORAL EVERY 8 HOURS PRN
Qty: 90 TABLET | Refills: 0 | Status: SHIPPED | OUTPATIENT
Start: 2024-11-27

## 2024-12-04 ENCOUNTER — TELEPHONE (OUTPATIENT)
Dept: GASTROENTEROLOGY | Facility: MEDICAL CENTER | Age: 64
End: 2024-12-04

## 2024-12-04 ENCOUNTER — OFFICE VISIT (OUTPATIENT)
Dept: GASTROENTEROLOGY | Facility: MEDICAL CENTER | Age: 64
End: 2024-12-04
Payer: COMMERCIAL

## 2024-12-04 VITALS
BODY MASS INDEX: 35.19 KG/M2 | HEART RATE: 56 BPM | TEMPERATURE: 96.3 F | WEIGHT: 205 LBS | SYSTOLIC BLOOD PRESSURE: 108 MMHG | DIASTOLIC BLOOD PRESSURE: 72 MMHG

## 2024-12-04 DIAGNOSIS — K76.0 HEPATIC STEATOSIS: ICD-10-CM

## 2024-12-04 DIAGNOSIS — E66.01 OBESITY, MORBID (HCC): ICD-10-CM

## 2024-12-04 DIAGNOSIS — Z90.49 S/P SMALL BOWEL RESECTION: ICD-10-CM

## 2024-12-04 DIAGNOSIS — K62.5 RECTAL BLEEDING: ICD-10-CM

## 2024-12-04 DIAGNOSIS — N18.30 STAGE 3 CHRONIC KIDNEY DISEASE, UNSPECIFIED WHETHER STAGE 3A OR 3B CKD (HCC): ICD-10-CM

## 2024-12-04 DIAGNOSIS — Z90.49 HISTORY OF COLON RESECTION: ICD-10-CM

## 2024-12-04 DIAGNOSIS — K58.1 IRRITABLE BOWEL SYNDROME WITH CONSTIPATION: ICD-10-CM

## 2024-12-04 DIAGNOSIS — K59.09 OTHER CONSTIPATION: ICD-10-CM

## 2024-12-04 DIAGNOSIS — K21.9 GASTROESOPHAGEAL REFLUX DISEASE WITHOUT ESOPHAGITIS: Primary | ICD-10-CM

## 2024-12-04 PROCEDURE — 99214 OFFICE O/P EST MOD 30 MIN: CPT | Performed by: INTERNAL MEDICINE

## 2024-12-04 RX ORDER — OMEPRAZOLE 40 MG/1
40 CAPSULE, DELAYED RELEASE ORAL DAILY
Qty: 90 CAPSULE | Refills: 2 | Status: SHIPPED | OUTPATIENT
Start: 2024-12-04 | End: 2025-08-31

## 2024-12-04 RX ORDER — SODIUM CHLORIDE, SODIUM LACTATE, POTASSIUM CHLORIDE, CALCIUM CHLORIDE 600; 310; 30; 20 MG/100ML; MG/100ML; MG/100ML; MG/100ML
125 INJECTION, SOLUTION INTRAVENOUS CONTINUOUS
OUTPATIENT
Start: 2024-12-04

## 2024-12-04 RX ORDER — PLECANATIDE 3 MG/1
3 TABLET ORAL DAILY
Qty: 30 TABLET | Refills: 2 | Status: SHIPPED | COMMUNITY
Start: 2024-12-04 | End: 2025-01-03

## 2024-12-04 RX ORDER — SUCRALFATE 1 G/1
1 TABLET ORAL 4 TIMES DAILY
Qty: 120 TABLET | Refills: 0 | Status: SHIPPED | OUTPATIENT
Start: 2024-12-04 | End: 2025-01-03

## 2024-12-04 NOTE — TELEPHONE ENCOUNTER
Our mutual patient, Nancy Jones, is scheduled for the following   procedure: Colonoscopy and Endoscopy   On: 1/14/25   With: Dr. Bernal    Nancy Jones is taking the following blood thinner: Eliquis       Can this be stopped 2 days prior to the procedure?         Thank you,  Dharmesh Mckenzie [facility]

## 2024-12-04 NOTE — PROGRESS NOTES
Name: Nancy Jones      : 1960      MRN: [id number]  Encounter Provider: Ramu Bernal MD  Encounter Date: 2024   Encounter department: [facility]  :  Assessment & Plan  Gastroesophageal reflux disease without esophagitis  Continue omeprazole. Decrease to once a day.    Last EGD in  - unremarkable.   EGD to follow up next year.       Orders:    sucralfate (CARAFATE) 1 g tablet; Take 1 tablet (1 g total) by mouth 4 (four) times a day    omeprazole (PriLOSEC) 40 MG capsule; Take 1 capsule (40 mg total) by mouth daily    Hepatic steatosis         Stage 3 chronic kidney disease, unspecified whether stage 3a or 3b CKD (HCC)  Lab Results   Component Value Date    EGFR 57 2024    EGFR 79 2024    EGFR 73 2024    CREATININE 1.02 2024    CREATININE 0.78 2024    CREATININE 0.83 2024            S/P small bowel resection         Obesity, morbid (HCC)         Other constipation  Reviewed CT scan personally.   Prior surgery.   Large amount of stool.   Consider Trulance. Reviewed risks of UTI (as listed) but less than 2%. She is okay to try it.   Continue miralax as needed.   Metamucil.   Update us in 2 - 3 weeks.   If Trulance is not approved then will try Amitiza.     Orders:    Plecanatide (Trulance) 3 MG TABS; Take 3 mg by mouth daily    Irritable bowel syndrome with constipation  As above     Orders:    Plecanatide (Trulance) 3 MG TABS; Take 3 mg by mouth daily        History of Present Illness     HPI  Nancy Jones is a 64 y.o. female who presents for follow up constipation.   She has a history of HTN, a fib, CAMILLE, CKD3, anxiety, ischemic colitis s/p resection, SMA thrombosis s/p stent, and history of peptic ulcer disease   History obtained from: patient    She feels she has pain in the left lower quadrant which occurs intermittently. She has to continue taking miralax. Stools are very hard.   She does not take fiber supplements.   She  takes miralax.   Has to wipe a lot.   Sometimes has blood.   She had a full colonoscopy in 2022 which was normal except for diverticulosis.     Last year had ischemic colitis :   LAPAROTOMY EXPLORATORY W/ BOWEL RESECTION  LAPAROSCOPY DIAGNOSTIC. LYSIS OF ADHESIONS. LAPARASCOPY TAKE TAKEDOWN OF LEFT COLON.   EXPLORATORY LAPAROSCOPY LYSIS OF ADHESIONS.   RESECTION TRANSVERSE COLON , SPLENIC FLEXURE, . AND DESCENDING COLON .   TAKE DOWN OF SPLENIC FLEXURE.   SIGMOIDOSCOPY.  MOBILIZATION OF RIGHT COLON.   PRIMARY ANASTOMOSIS EEA 29.   TAP BLOCK    Review of Systems   Constitutional: Negative.    HENT: Negative.     Eyes: Negative.    Respiratory: Negative.     Cardiovascular: Negative.    Gastrointestinal:         See HPI   Endocrine: Negative.    Genitourinary: Negative.    Musculoskeletal: Negative.    Skin: Negative.    Allergic/Immunologic: Negative.    Neurological: Negative.    Hematological: Negative.    Psychiatric/Behavioral: Negative.     All other systems reviewed and are negative.         Objective   /72   Pulse 56   Temp (!) 96.3 °F (35.7 °C)   Wt 93 kg (205 lb)   LMP  (LMP Unknown)   BMI 35.19 kg/m²      Physical Exam  Constitutional:       Appearance: Normal appearance. She is well-developed.   HENT:      Head: Normocephalic and atraumatic.   Eyes:      General: No scleral icterus.     Conjunctiva/sclera: Conjunctivae normal.      Pupils: Pupils are equal, round, and reactive to light.   Cardiovascular:      Rate and Rhythm: Normal rate and regular rhythm.      Heart sounds: Normal heart sounds.   Pulmonary:      Effort: Pulmonary effort is normal. No respiratory distress.      Breath sounds: Normal breath sounds.   Abdominal:      General: Bowel sounds are normal. There is no distension.      Palpations: Abdomen is soft. There is no mass.      Tenderness: There is no abdominal tenderness.      Hernia: No hernia is present.   Musculoskeletal:         General: Normal range of motion.       Cervical back: Normal range of motion.   Lymphadenopathy:      Cervical: No cervical adenopathy.   Skin:     General: Skin is warm.   Neurological:      Mental Status: She is alert and oriented to person, place, and time.   Psychiatric:         Behavior: Behavior normal.         Thought Content: Thought content normal.

## 2024-12-04 NOTE — ASSESSMENT & PLAN NOTE
Lab Results   Component Value Date    EGFR 57 08/08/2024    EGFR 79 05/01/2024    EGFR 73 04/30/2024    CREATININE 1.02 08/08/2024    CREATININE 0.78 05/01/2024    CREATININE 0.83 04/30/2024

## 2024-12-04 NOTE — ASSESSMENT & PLAN NOTE
Continue omeprazole. Decrease to once a day.    Last EGD in 2022 - unremarkable.   EGD to follow up next year.       Orders:    sucralfate (CARAFATE) 1 g tablet; Take 1 tablet (1 g total) by mouth 4 (four) times a day    omeprazole (PriLOSEC) 40 MG capsule; Take 1 capsule (40 mg total) by mouth daily

## 2024-12-04 NOTE — TELEPHONE ENCOUNTER
Procedure: Colon/EGD  Date: 1/14/25  Physician performing: Dr. Bernal  Location of procedure:  [facility]  Instructions given to patient: Miralax  Diabetic: No  Clearances: Yes-Eliquis

## 2024-12-05 ENCOUNTER — OFFICE VISIT (OUTPATIENT)
Age: 64
End: 2024-12-05
Payer: COMMERCIAL

## 2024-12-05 ENCOUNTER — RESULTS FOLLOW-UP (OUTPATIENT)
Age: 64
End: 2024-12-05

## 2024-12-05 VITALS
DIASTOLIC BLOOD PRESSURE: 76 MMHG | OXYGEN SATURATION: 99 % | SYSTOLIC BLOOD PRESSURE: 110 MMHG | WEIGHT: 202 LBS | BODY MASS INDEX: 34.49 KG/M2 | HEIGHT: 64 IN | HEART RATE: 63 BPM | TEMPERATURE: 97.9 F

## 2024-12-05 DIAGNOSIS — Z59.9 FINANCIAL DIFFICULTIES: ICD-10-CM

## 2024-12-05 DIAGNOSIS — K05.10 GINGIVITIS: Primary | ICD-10-CM

## 2024-12-05 DIAGNOSIS — Z00.00 WELCOME TO MEDICARE PREVENTIVE VISIT: ICD-10-CM

## 2024-12-05 DIAGNOSIS — Z00.00 MEDICARE ANNUAL WELLNESS VISIT, SUBSEQUENT: Primary | ICD-10-CM

## 2024-12-05 DIAGNOSIS — E08.49 DIABETES DUE TO UNDRL CONDITION W OTH DIABETIC NEURO COMP (HCC): ICD-10-CM

## 2024-12-05 DIAGNOSIS — Z12.31 ENCOUNTER FOR SCREENING MAMMOGRAM FOR BREAST CANCER: ICD-10-CM

## 2024-12-05 LAB
LEFT EYE DIABETIC RETINOPATHY: NORMAL
LEFT EYE IMAGE QUALITY: NORMAL
LEFT EYE MACULAR EDEMA: NORMAL
LEFT EYE OTHER RETINOPATHY: NORMAL
RIGHT EYE DIABETIC RETINOPATHY: NORMAL
RIGHT EYE IMAGE QUALITY: NORMAL
RIGHT EYE MACULAR EDEMA: NORMAL
RIGHT EYE OTHER RETINOPATHY: NORMAL
SEVERITY (EYE EXAM): NORMAL
SL AMB POCT HEMOGLOBIN AIC: 6.1 (ref ?–6.5)

## 2024-12-05 PROCEDURE — G0439 PPPS, SUBSEQ VISIT: HCPCS | Performed by: FAMILY MEDICINE

## 2024-12-05 PROCEDURE — 92250 FUNDUS PHOTOGRAPHY W/I&R: CPT | Performed by: FAMILY MEDICINE

## 2024-12-05 PROCEDURE — 83036 HEMOGLOBIN GLYCOSYLATED A1C: CPT | Performed by: FAMILY MEDICINE

## 2024-12-05 RX ORDER — CHLORHEXIDINE GLUCONATE ORAL RINSE 1.2 MG/ML
15 SOLUTION DENTAL 2 TIMES DAILY
Qty: 120 ML | Refills: 0 | Status: SHIPPED | OUTPATIENT
Start: 2024-12-05

## 2024-12-05 SDOH — ECONOMIC STABILITY - INCOME SECURITY: PROBLEM RELATED TO HOUSING AND ECONOMIC CIRCUMSTANCES, UNSPECIFIED: Z59.9

## 2024-12-05 NOTE — ASSESSMENT & PLAN NOTE
Lab Results   Component Value Date    HGBA1C 6.1 12/05/2024       Orders:    IRIS Diabetic eye exam    POCT hemoglobin A1c

## 2024-12-05 NOTE — PATIENT INSTRUCTIONS
Medicare Preventive Visit Patient Instructions  Thank you for completing your Welcome to Medicare Visit or Medicare Annual Wellness Visit today. Your next wellness visit will be due in one year (12/6/2025).  The screening/preventive services that you may require over the next 5-10 years are detailed below. Some tests may not apply to you based off risk factors and/or age. Screening tests ordered at today's visit but not completed yet may show as past due. Also, please note that scanned in results may not display below.  Preventive Screenings:  Service Recommendations Previous Testing/Comments   Colorectal Cancer Screening  * Colonoscopy    * Fecal Occult Blood Test (FOBT)/Fecal Immunochemical Test (FIT)  * Fecal DNA/Cologuard Test  * Flexible Sigmoidoscopy Age: 45-75 years old   Colonoscopy: every 10 years (may be performed more frequently if at higher risk)  OR  FOBT/FIT: every 1 year  OR  Cologuard: every 3 years  OR  Sigmoidoscopy: every 5 years  Screening may be recommended earlier than age 45 if at higher risk for colorectal cancer. Also, an individualized decision between you and your healthcare provider will decide whether screening between the ages of 76-85 would be appropriate. Colonoscopy: 07/13/2022  FOBT/FIT: Not on file  Cologuard: Not on file  Sigmoidoscopy: 07/19/2023    Screening Current     Breast Cancer Screening Age: 40+ years old  Frequency: every 1-2 years  Not required if history of left and right mastectomy Mammogram: 12/29/2023    Screening Current   Cervical Cancer Screening Between the ages of 21-29, pap smear recommended once every 3 years.   Between the ages of 30-65, can perform pap smear with HPV co-testing every 5 years.   Recommendations may differ for women with a history of total hysterectomy, cervical cancer, or abnormal pap smears in past. Pap Smear: 10/22/2020    Screening Not Indicated   Hepatitis C Screening Once for adults born between 1945 and 1965  More frequently in  patients at high risk for Hepatitis C Hep C Antibody: 05/14/2019    Screening Current   Diabetes Screening 1-2 times per year if you're at risk for diabetes or have pre-diabetes Fasting glucose: 89 mg/dL (8/8/2024)  A1C: 6.1 (12/5/2024)  Screening Not Indicated  History Diabetes   Cholesterol Screening Once every 5 years if you don't have a lipid disorder. May order more often based on risk factors. Lipid panel: 05/22/2024    Screening Not Indicated  History Lipid Disorder     Other Preventive Screenings Covered by Medicare:  Abdominal Aortic Aneurysm (AAA) Screening: covered once if your at risk. You're considered to be at risk if you have a family history of AAA.  Lung Cancer Screening: covers low dose CT scan once per year if you meet all of the following conditions: (1) Age 55-77; (2) No signs or symptoms of lung cancer; (3) Current smoker or have quit smoking within the last 15 years; (4) You have a tobacco smoking history of at least 20 pack years (packs per day multiplied by number of years you smoked); (5) You get a written order from a healthcare provider.  Glaucoma Screening: covered annually if you're considered high risk: (1) You have diabetes OR (2) Family history of glaucoma OR (3)  aged 50 and older OR (4)  American aged 65 and older  Osteoporosis Screening: covered every 2 years if you meet one of the following conditions: (1) You're estrogen deficient and at risk for osteoporosis based off medical history and other findings; (2) Have a vertebral abnormality; (3) On glucocorticoid therapy for more than 3 months; (4) Have primary hyperparathyroidism; (5) On osteoporosis medications and need to assess response to drug therapy.   Last bone density test (DXA Scan): 01/02/2024.  HIV Screening: covered annually if you're between the age of 15-65. Also covered annually if you are younger than 15 and older than 65 with risk factors for HIV infection. For pregnant patients, it is  covered up to 3 times per pregnancy.    Immunizations:  Immunization Recommendations   Influenza Vaccine Annual influenza vaccination during flu season is recommended for all persons aged >= 6 months who do not have contraindications   Pneumococcal Vaccine   * Pneumococcal conjugate vaccine = PCV13 (Prevnar 13), PCV15 (Vaxneuvance), PCV20 (Prevnar 20)  * Pneumococcal polysaccharide vaccine = PPSV23 (Pneumovax) Adults 19-65 yo with certain risk factors or if 65+ yo  If never received any pneumonia vaccine: recommend Prevnar 20 (PCV20)  Give PCV20 if previously received 1 dose of PCV13 or PPSV23   Hepatitis B Vaccine 3 dose series if at intermediate or high risk (ex: diabetes, end stage renal disease, liver disease)   Respiratory syncytial virus (RSV) Vaccine - COVERED BY MEDICARE PART D  * RSVPreF3 (Arexvy) CDC recommends that adults 60 years of age and older may receive a single dose of RSV vaccine using shared clinical decision-making (SCDM)   Tetanus (Td) Vaccine - COST NOT COVERED BY MEDICARE PART B Following completion of primary series, a booster dose should be given every 10 years to maintain immunity against tetanus. Td may also be given as tetanus wound prophylaxis.   Tdap Vaccine - COST NOT COVERED BY MEDICARE PART B Recommended at least once for all adults. For pregnant patients, recommended with each pregnancy.   Shingles Vaccine (Shingrix) - COST NOT COVERED BY MEDICARE PART B  2 shot series recommended in those 19 years and older who have or will have weakened immune systems or those 50 years and older     Health Maintenance Due:      Topic Date Due   • Breast Cancer Screening: Mammogram  12/29/2025   • Colorectal Cancer Screening  07/10/2032   • Hepatitis C Screening  Completed   • Cervical Cancer Screening  Discontinued     Immunizations Due:      Topic Date Due   • Pneumococcal Vaccine: 65+ Years (1 of 2 - PCV) Never done   • Influenza Vaccine (1) Never done   • COVID-19 Vaccine (1 - 2024-25 season)  Never done     Advance Directives   What are advance directives?  Advance directives are legal documents that state your wishes and plans for medical care. These plans are made ahead of time in case you lose your ability to make decisions for yourself. Advance directives can apply to any medical decision, such as the treatments you want, and if you want to donate organs.   What are the types of advance directives?  There are many types of advance directives, and each state has rules about how to use them. You may choose a combination of any of the following:  Living will:  This is a written record of the treatment you want. You can also choose which treatments you do not want, which to limit, and which to stop at a certain time. This includes surgery, medicine, IV fluid, and tube feedings.   Durable power of  for healthcare (DPAHC):  This is a written record that states who you want to make healthcare choices for you when you are unable to make them for yourself. This person, called a proxy, is usually a family member or a friend. You may choose more than 1 proxy.  Do not resuscitate (DNR) order:  A DNR order is used in case your heart stops beating or you stop breathing. It is a request not to have certain forms of treatment, such as CPR. A DNR order may be included in other types of advance directives.  Medical directive:  This covers the care that you want if you are in a coma, near death, or unable to make decisions for yourself. You can list the treatments you want for each condition. Treatment may include pain medicine, surgery, blood transfusions, dialysis, IV or tube feedings, and a ventilator (breathing machine).  Values history:  This document has questions about your views, beliefs, and how you feel and think about life. This information can help others choose the care that you would choose.  Why are advance directives important?  An advance directive helps you control your care. Although spoken  wishes may be used, it is better to have your wishes written down. Spoken wishes can be misunderstood, or not followed. Treatments may be given even if you do not want them. An advance directive may make it easier for your family to make difficult choices about your care.   Fall Prevention    Fall prevention  includes ways to make your home and other areas safer. It also includes ways you can move more carefully to prevent a fall. Health conditions that cause changes in your blood pressure, vision, or muscle strength and coordination may increase your risk for falls. Medicines may also increase your risk for falls if they make you dizzy, weak, or sleepy.   Fall prevention tips:   Stand or sit up slowly.    Use assistive devices as directed.    Wear shoes that fit well and have soles that .    Wear a personal alarm.    Stay active.    Manage your medical conditions.    Home Safety Tips:  Add items to prevent falls in the bathroom.    Keep paths clear.    Install bright lights in your home.    Keep items you use often on shelves within reach.    Paint or place reflective tape on the edges of your stairs.    Urinary Incontinence   Urinary incontinence (UI)  is when you lose control of your bladder. UI develops because your bladder cannot store or empty urine properly. The 3 most common types of UI are stress incontinence, urge incontinence, or both.  Medicines:   May be given to help strengthen your bladder control. Report any side effects of medication to your healthcare provider.  Do pelvic muscle exercises often:  Your pelvic muscles help you stop urinating. Squeeze these muscles tight for 5 seconds, then relax for 5 seconds. Gradually work up to squeezing for 10 seconds. Do 3 sets of 15 repetitions a day, or as directed. This will help strengthen your pelvic muscles and improve bladder control.  Train your bladder:  Go to the bathroom at set times, such as every 2 hours, even if you do not feel the urge to go.  You can also try to hold your urine when you feel the urge to go. For example, hold your urine for 5 minutes when you feel the urge to go. As that becomes easier, hold your urine for 10 minutes.   Self-care:   Keep a UI record.  Write down how often you leak urine and how much you leak. Make a note of what you were doing when you leaked urine.  Drink liquids as directed. You may need to limit the amount of liquid you drink to help control your urine leakage. Do not drink any liquid right before you go to bed. Limit or do not have drinks that contain caffeine or alcohol.   Prevent constipation.  Eat a variety of high-fiber foods. Good examples are high-fiber cereals, beans, vegetables, and whole-grain breads. Walking is the best way to trigger your intestines to have a bowel movement.  Exercise regularly and maintain a healthy weight.  Weight loss and exercise will decrease pressure on your bladder and help you control your leakage.   Use a catheter as directed  to help empty your bladder. A catheter is a tiny, plastic tube that is put into your bladder to drain your urine.   Go to behavior therapy as directed.  Behavior therapy may be used to help you learn to control your urge to urinate.    Weight Management   Why it is important to manage your weight:  Being overweight increases your risk of health conditions such as heart disease, high blood pressure, type 2 diabetes, and certain types of cancer. It can also increase your risk for osteoarthritis, sleep apnea, and other respiratory problems. Aim for a slow, steady weight loss. Even a small amount of weight loss can lower your risk of health problems.  How to lose weight safely:  A safe and healthy way to lose weight is to eat fewer calories and get regular exercise. You can lose up about 1 pound a week by decreasing the number of calories you eat by 500 calories each day.   Healthy meal plan for weight management:  A healthy meal plan includes a variety of foods,  contains fewer calories, and helps you stay healthy. A healthy meal plan includes the following:  Eat whole-grain foods more often.  A healthy meal plan should contain fiber. Fiber is the part of grains, fruits, and vegetables that is not broken down by your body. Whole-grain foods are healthy and provide extra fiber in your diet. Some examples of whole-grain foods are whole-wheat breads and pastas, oatmeal, brown rice, and bulgur.  Eat a variety of vegetables every day.  Include dark, leafy greens such as spinach, kale, all greens, and mustard greens. Eat yellow and orange vegetables such as carrots, sweet potatoes, and winter squash.   Eat a variety of fruits every day.  Choose fresh or canned fruit (canned in its own juice or light syrup) instead of juice. Fruit juice has very little or no fiber.  Eat low-fat dairy foods.  Drink fat-free (skim) milk or 1% milk. Eat fat-free yogurt and low-fat cottage cheese. Try low-fat cheeses such as mozzarella and other reduced-fat cheeses.  Choose meat and other protein foods that are low in fat.  Choose beans or other legumes such as split peas or lentils. Choose fish, skinless poultry (chicken or turkey), or lean cuts of red meat (beef or pork). Before you cook meat or poultry, cut off any visible fat.   Use less fat and oil.  Try baking foods instead of frying them. Add less fat, such as margarine, sour cream, regular salad dressing and mayonnaise to foods. Eat fewer high-fat foods. Some examples of high-fat foods include french fries, doughnuts, ice cream, and cakes.  Eat fewer sweets.  Limit foods and drinks that are high in sugar. This includes candy, cookies, regular soda, and sweetened drinks.  Exercise:  Exercise at least 30 minutes per day on most days of the week. Some examples of exercise include walking, biking, dancing, and swimming. You can also fit in more physical activity by taking the stairs instead of the elevator or parking farther away from stores.  Ask your healthcare provider about the best exercise plan for you.   Narcotic (Opioid) Safety    Use narcotics safely:  Take prescribed narcotics exactly as directed  Do not give narcotics to others or take narcotics that belong to someone else  Do not mix narcotics without medicines or alcohol  Do not drive or operate heavy machinery after you take the narcotic  Monitor for side effects and notify your healthcare provider if you experienced side effects such as nausea, sleepiness, itching, or trouble thinking clearly.    Manage constipation:    Constipation is the most common side effect of narcotic medicine. Constipation is when you have hard, dry bowel movements, or you go longer than usual between bowel movements. Tell your healthcare provider about all changes in your bowel movements while you are taking narcotics. He or she may recommend laxative medicine to help you have a bowel movement. He or she may also change the kind of narcotic you are taking, or change when you take it. The following are more ways you can prevent or relieve constipation:    Drink liquids as directed.  You may need to drink extra liquids to help soften and move your bowels. Ask how much liquid to drink each day and which liquids are best for you.  Eat high-fiber foods.  This may help decrease constipation by adding bulk to your bowel movements. High-fiber foods include fruits, vegetables, whole-grain breads and cereals, and beans. Your healthcare provider or dietitian can help you create a high-fiber meal plan. Your provider may also recommend a fiber supplement if you cannot get enough fiber from food.  Exercise regularly.  Regular physical activity can help stimulate your intestines. Walking is a good exercise to prevent or relieve constipation. Ask which exercises are best for you.  Schedule a time each day to have a bowel movement.  This may help train your body to have regular bowel movements. Bend forward while you are on the  toilet to help move the bowel movement out. Sit on the toilet for at least 10 minutes, even if you do not have a bowel movement.    Store narcotics safely:   Store narcotics where others cannot easily get them.  Keep them in a locked cabinet or secure area. Do not  keep them in a purse or other bag you carry with you. A person may be looking for something else and find the narcotics.  Make sure narcotics are stored out of the reach of children.  A child can easily overdose on narcotics. Narcotics may look like candy to a small child.    The best way to dispose of narcotics:      The laws vary by country and area. In the [place], the best way is to return the narcotics through a take-back program. This program is offered by the [place] Drug Enforcement Agency (CHARLINE). The following are options for using the program:  Take the narcotics to a [employer]LINE collection site.  The site is often a law enforcement center. Call your local law enforcement center for scheduled take-back days in your area. You will be given information on where to go if the collection site is in a different location.  Take the narcotics to an approved pharmacy or hospital.  A pharmacy or hospital may be set up as a collection site. You will need to ask if it is a CHARLINE collection site if you were not directed there. A pharmacy or doctor's office may not be able to take back narcotics unless it is a CHARLINE site.  Use a mail-back system.  This means you are given containers to put the narcotics into. You will then mail them in the containers.  Use a take-back drop box.  This is a place to leave the narcotics at any time. People and animals will not be able to get into the box. Your local law enforcement agency can tell you where to find a drop box in your area.    Other ways to manage pain:   Ask your healthcare provider about non-narcotic medicines to control pain.  Nonprescription medicines include NSAIDs (such as ibuprofen) and acetaminophen. Prescription  medicines include muscle relaxers, antidepressants, and steroids.  Pain may be managed without any medicines.  Some ways to relieve pain include massage, aromatherapy, or meditation. Physical or occupational therapy may also help.    For more information:   Drug Enforcement Administration  [address] [place] [address]  Phone: 4- 704 - 692-7567  Web Address: https://www.deadiversion.[employer].gov/drug_disposal/    [place] Food and Drug Administration  [address] [place] [address]  Phone: 7- 412 - 467-2275  Web Address: http://www.fda.gov     © Copyright [employer] 2018 Information is for End User's use only and may not be sold, redistributed or otherwise used for commercial purposes. All illustrations and images included in CareNotes® are the copyrighted property of [employer] or [employer]      Medicare Preventive Visit Patient Instructions  Thank you for completing your Welcome to Medicare Visit or Medicare Annual Wellness Visit today. Your next wellness visit will be due in one year (12/6/2025).  The screening/preventive services that you may require over the next 5-10 years are detailed below. Some tests may not apply to you based off risk factors and/or age. Screening tests ordered at today's visit but not completed yet may show as past due. Also, please note that scanned in results may not display below.  Preventive Screenings:  Service Recommendations Previous Testing/Comments   Colorectal Cancer Screening  * Colonoscopy    * Fecal Occult Blood Test (FOBT)/Fecal Immunochemical Test (FIT)  * Fecal DNA/Cologuard Test  * Flexible Sigmoidoscopy Age: 45-75 years old   Colonoscopy: every 10 years (may be performed more frequently if at higher risk)  OR  FOBT/FIT: every 1 year  OR  Cologuard: every 3 years  OR  Sigmoidoscopy: every 5 years  Screening may be recommended earlier than age 45 if at higher risk for colorectal cancer. Also, an individualized decision between  you and your healthcare provider will decide whether screening between the ages of 76-85 would be appropriate. Colonoscopy: 07/13/2022  FOBT/FIT: Not on file  Cologuard: Not on file  Sigmoidoscopy: 07/19/2023    Screening Current     Breast Cancer Screening Age: 40+ years old  Frequency: every 1-2 years  Not required if history of left and right mastectomy Mammogram: 12/29/2023    Screening Current   Cervical Cancer Screening Between the ages of 21-29, pap smear recommended once every 3 years.   Between the ages of 30-65, can perform pap smear with HPV co-testing every 5 years.   Recommendations may differ for women with a history of total hysterectomy, cervical cancer, or abnormal pap smears in past. Pap Smear: 10/22/2020    Screening Not Indicated   Hepatitis C Screening Once for adults born between 1945 and 1965  More frequently in patients at high risk for Hepatitis C Hep C Antibody: 05/14/2019    Screening Current   Diabetes Screening 1-2 times per year if you're at risk for diabetes or have pre-diabetes Fasting glucose: 89 mg/dL (8/8/2024)  A1C: 6.1 (12/5/2024)  Screening Not Indicated  History Diabetes   Cholesterol Screening Once every 5 years if you don't have a lipid disorder. May order more often based on risk factors. Lipid panel: 05/22/2024    Screening Not Indicated  History Lipid Disorder     Other Preventive Screenings Covered by Medicare:  Abdominal Aortic Aneurysm (AAA) Screening: covered once if your at risk. You're considered to be at risk if you have a family history of AAA.  Lung Cancer Screening: covers low dose CT scan once per year if you meet all of the following conditions: (1) Age 55-77; (2) No signs or symptoms of lung cancer; (3) Current smoker or have quit smoking within the last 15 years; (4) You have a tobacco smoking history of at least 20 pack years (packs per day multiplied by number of years you smoked); (5) You get a written order from a healthcare provider.  Glaucoma Screening:  covered annually if you're considered high risk: (1) You have diabetes OR (2) Family history of glaucoma OR (3)  aged 50 and older OR (4)  American aged 65 and older  Osteoporosis Screening: covered every 2 years if you meet one of the following conditions: (1) You're estrogen deficient and at risk for osteoporosis based off medical history and other findings; (2) Have a vertebral abnormality; (3) On glucocorticoid therapy for more than 3 months; (4) Have primary hyperparathyroidism; (5) On osteoporosis medications and need to assess response to drug therapy.   Last bone density test (DXA Scan): 01/02/2024.  HIV Screening: covered annually if you're between the age of 15-65. Also covered annually if you are younger than 15 and older than 65 with risk factors for HIV infection. For pregnant patients, it is covered up to 3 times per pregnancy.    Immunizations:  Immunization Recommendations   Influenza Vaccine Annual influenza vaccination during flu season is recommended for all persons aged >= 6 months who do not have contraindications   Pneumococcal Vaccine   * Pneumococcal conjugate vaccine = PCV13 (Prevnar 13), PCV15 (Vaxneuvance), PCV20 (Prevnar 20)  * Pneumococcal polysaccharide vaccine = PPSV23 (Pneumovax) Adults 19-65 yo with certain risk factors or if 65+ yo  If never received any pneumonia vaccine: recommend Prevnar 20 (PCV20)  Give PCV20 if previously received 1 dose of PCV13 or PPSV23   Hepatitis B Vaccine 3 dose series if at intermediate or high risk (ex: diabetes, end stage renal disease, liver disease)   Respiratory syncytial virus (RSV) Vaccine - COVERED BY MEDICARE PART D  * RSVPreF3 (Arexvy) CDC recommends that adults 60 years of age and older may receive a single dose of RSV vaccine using shared clinical decision-making (SCDM)   Tetanus (Td) Vaccine - COST NOT COVERED BY MEDICARE PART B Following completion of primary series, a booster dose should be given every 10 years to  maintain immunity against tetanus. Td may also be given as tetanus wound prophylaxis.   Tdap Vaccine - COST NOT COVERED BY MEDICARE PART B Recommended at least once for all adults. For pregnant patients, recommended with each pregnancy.   Shingles Vaccine (Shingrix) - COST NOT COVERED BY MEDICARE PART B  2 shot series recommended in those 19 years and older who have or will have weakened immune systems or those 50 years and older     Health Maintenance Due:      Topic Date Due   • Breast Cancer Screening: Mammogram  12/29/2025   • Colorectal Cancer Screening  07/10/2032   • Hepatitis C Screening  Completed   • Cervical Cancer Screening  Discontinued     Immunizations Due:      Topic Date Due   • Pneumococcal Vaccine: 65+ Years (1 of 2 - PCV) Never done   • Influenza Vaccine (1) Never done   • COVID-19 Vaccine (1 - 2024-25 season) Never done     Advance Directives   What are advance directives?  Advance directives are legal documents that state your wishes and plans for medical care. These plans are made ahead of time in case you lose your ability to make decisions for yourself. Advance directives can apply to any medical decision, such as the treatments you want, and if you want to donate organs.   What are the types of advance directives?  There are many types of advance directives, and each state has rules about how to use them. You may choose a combination of any of the following:  Living will:  This is a written record of the treatment you want. You can also choose which treatments you do not want, which to limit, and which to stop at a certain time. This includes surgery, medicine, IV fluid, and tube feedings.   Durable power of  for healthcare (DPAHC):  This is a written record that states who you want to make healthcare choices for you when you are unable to make them for yourself. This person, called a proxy, is usually a family member or a friend. You may choose more than 1 proxy.  Do not  resuscitate (DNR) order:  A DNR order is used in case your heart stops beating or you stop breathing. It is a request not to have certain forms of treatment, such as CPR. A DNR order may be included in other types of advance directives.  Medical directive:  This covers the care that you want if you are in a coma, near death, or unable to make decisions for yourself. You can list the treatments you want for each condition. Treatment may include pain medicine, surgery, blood transfusions, dialysis, IV or tube feedings, and a ventilator (breathing machine).  Values history:  This document has questions about your views, beliefs, and how you feel and think about life. This information can help others choose the care that you would choose.  Why are advance directives important?  An advance directive helps you control your care. Although spoken wishes may be used, it is better to have your wishes written down. Spoken wishes can be misunderstood, or not followed. Treatments may be given even if you do not want them. An advance directive may make it easier for your family to make difficult choices about your care.   Fall Prevention    Fall prevention  includes ways to make your home and other areas safer. It also includes ways you can move more carefully to prevent a fall. Health conditions that cause changes in your blood pressure, vision, or muscle strength and coordination may increase your risk for falls. Medicines may also increase your risk for falls if they make you dizzy, weak, or sleepy.   Fall prevention tips:   Stand or sit up slowly.    Use assistive devices as directed.    Wear shoes that fit well and have soles that .    Wear a personal alarm.    Stay active.    Manage your medical conditions.    Home Safety Tips:  Add items to prevent falls in the bathroom.    Keep paths clear.    Install bright lights in your home.    Keep items you use often on shelves within reach.    Paint or place reflective tape on  the edges of your stairs.    Urinary Incontinence   Urinary incontinence (UI)  is when you lose control of your bladder. UI develops because your bladder cannot store or empty urine properly. The 3 most common types of UI are stress incontinence, urge incontinence, or both.  Medicines:   May be given to help strengthen your bladder control. Report any side effects of medication to your healthcare provider.  Do pelvic muscle exercises often:  Your pelvic muscles help you stop urinating. Squeeze these muscles tight for 5 seconds, then relax for 5 seconds. Gradually work up to squeezing for 10 seconds. Do 3 sets of 15 repetitions a day, or as directed. This will help strengthen your pelvic muscles and improve bladder control.  Train your bladder:  Go to the bathroom at set times, such as every 2 hours, even if you do not feel the urge to go. You can also try to hold your urine when you feel the urge to go. For example, hold your urine for 5 minutes when you feel the urge to go. As that becomes easier, hold your urine for 10 minutes.   Self-care:   Keep a UI record.  Write down how often you leak urine and how much you leak. Make a note of what you were doing when you leaked urine.  Drink liquids as directed. You may need to limit the amount of liquid you drink to help control your urine leakage. Do not drink any liquid right before you go to bed. Limit or do not have drinks that contain caffeine or alcohol.   Prevent constipation.  Eat a variety of high-fiber foods. Good examples are high-fiber cereals, beans, vegetables, and whole-grain breads. Walking is the best way to trigger your intestines to have a bowel movement.  Exercise regularly and maintain a healthy weight.  Weight loss and exercise will decrease pressure on your bladder and help you control your leakage.   Use a catheter as directed  to help empty your bladder. A catheter is a tiny, plastic tube that is put into your bladder to drain your urine.   Go to  behavior therapy as directed.  Behavior therapy may be used to help you learn to control your urge to urinate.    Weight Management   Why it is important to manage your weight:  Being overweight increases your risk of health conditions such as heart disease, high blood pressure, type 2 diabetes, and certain types of cancer. It can also increase your risk for osteoarthritis, sleep apnea, and other respiratory problems. Aim for a slow, steady weight loss. Even a small amount of weight loss can lower your risk of health problems.  How to lose weight safely:  A safe and healthy way to lose weight is to eat fewer calories and get regular exercise. You can lose up about 1 pound a week by decreasing the number of calories you eat by 500 calories each day.   Healthy meal plan for weight management:  A healthy meal plan includes a variety of foods, contains fewer calories, and helps you stay healthy. A healthy meal plan includes the following:  Eat whole-grain foods more often.  A healthy meal plan should contain fiber. Fiber is the part of grains, fruits, and vegetables that is not broken down by your body. Whole-grain foods are healthy and provide extra fiber in your diet. Some examples of whole-grain foods are whole-wheat breads and pastas, oatmeal, brown rice, and bulgur.  Eat a variety of vegetables every day.  Include dark, leafy greens such as spinach, kale, all greens, and mustard greens. Eat yellow and orange vegetables such as carrots, sweet potatoes, and winter squash.   Eat a variety of fruits every day.  Choose fresh or canned fruit (canned in its own juice or light syrup) instead of juice. Fruit juice has very little or no fiber.  Eat low-fat dairy foods.  Drink fat-free (skim) milk or 1% milk. Eat fat-free yogurt and low-fat cottage cheese. Try low-fat cheeses such as mozzarella and other reduced-fat cheeses.  Choose meat and other protein foods that are low in fat.  Choose beans or other legumes such as  split peas or lentils. Choose fish, skinless poultry (chicken or turkey), or lean cuts of red meat (beef or pork). Before you cook meat or poultry, cut off any visible fat.   Use less fat and oil.  Try baking foods instead of frying them. Add less fat, such as margarine, sour cream, regular salad dressing and mayonnaise to foods. Eat fewer high-fat foods. Some examples of high-fat foods include french fries, doughnuts, ice cream, and cakes.  Eat fewer sweets.  Limit foods and drinks that are high in sugar. This includes candy, cookies, regular soda, and sweetened drinks.  Exercise:  Exercise at least 30 minutes per day on most days of the week. Some examples of exercise include walking, biking, dancing, and swimming. You can also fit in more physical activity by taking the stairs instead of the elevator or parking farther away from stores. Ask your healthcare provider about the best exercise plan for you.   Narcotic (Opioid) Safety    Use narcotics safely:  Take prescribed narcotics exactly as directed  Do not give narcotics to others or take narcotics that belong to someone else  Do not mix narcotics without medicines or alcohol  Do not drive or operate heavy machinery after you take the narcotic  Monitor for side effects and notify your healthcare provider if you experienced side effects such as nausea, sleepiness, itching, or trouble thinking clearly.    Manage constipation:    Constipation is the most common side effect of narcotic medicine. Constipation is when you have hard, dry bowel movements, or you go longer than usual between bowel movements. Tell your healthcare provider about all changes in your bowel movements while you are taking narcotics. He or she may recommend laxative medicine to help you have a bowel movement. He or she may also change the kind of narcotic you are taking, or change when you take it. The following are more ways you can prevent or relieve constipation:    Drink liquids as  directed.  You may need to drink extra liquids to help soften and move your bowels. Ask how much liquid to drink each day and which liquids are best for you.  Eat high-fiber foods.  This may help decrease constipation by adding bulk to your bowel movements. High-fiber foods include fruits, vegetables, whole-grain breads and cereals, and beans. Your healthcare provider or dietitian can help you create a high-fiber meal plan. Your provider may also recommend a fiber supplement if you cannot get enough fiber from food.  Exercise regularly.  Regular physical activity can help stimulate your intestines. Walking is a good exercise to prevent or relieve constipation. Ask which exercises are best for you.  Schedule a time each day to have a bowel movement.  This may help train your body to have regular bowel movements. Bend forward while you are on the toilet to help move the bowel movement out. Sit on the toilet for at least 10 minutes, even if you do not have a bowel movement.    Store narcotics safely:   Store narcotics where others cannot easily get them.  Keep them in a locked cabinet or secure area. Do not  keep them in a purse or other bag you carry with you. A person may be looking for something else and find the narcotics.  Make sure narcotics are stored out of the reach of children.  A child can easily overdose on narcotics. Narcotics may look like candy to a small child.    The best way to dispose of narcotics:      The laws vary by country and area. In the [place], the best way is to return the narcotics through a take-back program. This program is offered by the [place] Drug Enforcement Agency (CHARLINE). The following are options for using the program:  Take the narcotics to a CHARLINE collection site.  The site is often a law enforcement center. Call your local law enforcement center for scheduled take-back days in your area. You will be given information on where to go if the collection site is in a different  location.  Take the narcotics to an approved pharmacy or hospital.  A pharmacy or hospital may be set up as a collection site. You will need to ask if it is a CHARLINE collection site if you were not directed there. A pharmacy or doctor's office may not be able to take back narcotics unless it is a CHARLINE site.  Use a mail-back system.  This means you are given containers to put the narcotics into. You will then mail them in the containers.  Use a take-back drop box.  This is a place to leave the narcotics at any time. People and animals will not be able to get into the box. Your local law enforcement agency can tell you where to find a drop box in your area.    Other ways to manage pain:   Ask your healthcare provider about non-narcotic medicines to control pain.  Nonprescription medicines include NSAIDs (such as ibuprofen) and acetaminophen. Prescription medicines include muscle relaxers, antidepressants, and steroids.  Pain may be managed without any medicines.  Some ways to relieve pain include massage, aromatherapy, or meditation. Physical or occupational therapy may also help.    For more information:   Drug Enforcement Administration  [address] [place] [address]  Phone: 7- 152 - 170-1100  Web Address: https://www.deadiversion.[facility].gov/drug_disposal/    [place] Food and Drug Administration  [address] [place] [address]  Phone: 4- 661 - 272-2036  Web Address: http://www.fda.gov     © Copyright [employer] 2018 Information is for End User's use only and may not be sold, redistributed or otherwise used for commercial purposes. All illustrations and images included in CareNotes® are the copyrighted property of A.D.A.M., Inc. or [employer]

## 2024-12-05 NOTE — PROGRESS NOTES
Diabetic Foot Exam    Patient's shoes and socks removed.    Right Foot/Ankle   Right Foot Inspection  Skin Exam: skin normal and skin intact. No dry skin, no warmth, no callus, no erythema, no maceration, no abnormal color, no pre-ulcer, no ulcer and no callus.     Toe Exam: ROM and strength within normal limits.     Sensory   Monofilament testing: diminished    Vascular  The right DP pulse is 2+. The right PT pulse is 2+.     Left Foot/Ankle  Left Foot Inspection  Skin Exam: skin normal and skin intact. No dry skin, no warmth, no erythema, no maceration, normal color, no pre-ulcer, no ulcer and no callus.     Toe Exam: ROM and strength within normal limits.     Sensory   Monofilament testing: diminished    Vascular  The left DP pulse is 2+. The left PT pulse is 2+.     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 Personalized health advice and appropriate referrals for health education or preventive services given if needed, as noted in patient's After Visit Summary.    History of Present Illness     HPI    Patient Care Team:  Coy Ayoub, DO as PCP - General (Family Medicine)  Coy Ayoub, DO as PCP - PCP-[facility] Attributed-Roster  Coy Ayoub, DO as PCP - PCP-[employer]-Medicaid (RTE)  Coy Ayoub, DO as PCP - PCP-[facility] (RTE)  Coygonzalo Ayoub, DO as PCP - PCP-[facility] (RTE)  MD Alexis Gardiner MD (Urology)    Review of Systems  Medical History Reviewed by provider this encounter:       Annual Wellness Visit Questionnaire   Nancy is here for her Subsequent Wellness visit.     Health Risk Assessment:   Patient rates overall health as fair. Patient feels that their physical health rating is slightly better. Patient is dissatisfied with their life. Eyesight was rated as slightly worse. Hearing was rated as same. Patient feels that their emotional and mental health rating is slightly better. Patients states they are sometimes angry. Patient states they are sometimes unusually tired/fatigued. Pain experienced in the last 7 days has been some. Patient's pain rating has been 7/10. Patient states that she has experienced no weight loss or gain in last 6 months.     Depression Screening:   PHQ-2 Score: 1  PHQ-9 Score: 1      Fall Risk Screening:   In the past year, patient has experienced: history of falling in past year    Number of falls: 1  Injured during fall?: Yes    Feels unsteady when standing or walking?: No    Worried about falling?: No      Urinary Incontinence Screening:   Patient has leaked urine accidently in the last six months.     Home Safety:  Patient does not have trouble with stairs inside or outside of their home. Patient has working smoke alarms and has working carbon monoxide detector. Home safety hazards include: none.     Nutrition:   Current diet is No Added Salt and Frequent junk food.     Medications:   Patient is currently taking over-the-counter supplements. OTC medications include: see medication list. Patient is able to manage medications.     Activities  of Daily Living (ADLs)/Instrumental Activities of Daily Living (IADLs):   Walk and transfer into and out of bed and chair?: Yes  Dress and groom yourself?: Yes    Bathe or shower yourself?: Yes    Feed yourself? Yes  Do your laundry/housekeeping?: Yes  Manage your money, pay your bills and track your expenses?: Yes  Make your own meals?: Yes    Do your own shopping?: Yes    Previous Hospitalizations:   Any hospitalizations or ED visits within the last 12 months?: No      Advance Care Planning:   Living will: No    Durable POA for healthcare: No    Advanced directive: No      Cognitive Screening:   Provider or family/friend/caregiver concerned regarding cognition?: No    PREVENTIVE SCREENINGS      Cardiovascular Screening:    General: Screening Not Indicated, History Lipid Disorder, Risks and Benefits Discussed and Screening Current      Diabetes Screening:     General: Screening Not Indicated, History Diabetes, Risks and Benefits Discussed and Screening Current      Colorectal Cancer Screening:     General: Screening Current and Risks and Benefits Discussed      Breast Cancer Screening:     General: Screening Current and Risks and Benefits Discussed    Due for: Mammogram        Cervical Cancer Screening:    General: Screening Not Indicated, Risks and Benefits Discussed and Screening Current      Osteoporosis Screening:    General: Risks and Benefits Discussed and Screening Current      Abdominal Aortic Aneurysm (AAA) Screening:    Risk factors include: family history of AAA        General: Risks and Benefits Discussed and Screening Not Indicated      Lung Cancer Screening:     General: Screening Not Indicated and Risks and Benefits Discussed      Hepatitis C Screening:    General: Screening Current and Risks and Benefits Discussed    Screening, Brief Intervention, and Referral to Treatment (SBIRT)    Screening  Typical number of drinks in a day: 0  Typical number of drinks in a week: 0  Interpretation: Low risk  "drinking behavior.    AUDIT-C Screenin) How often did you have a drink containing alcohol in the past year? never  2) How many drinks did you have on a typical day when you were drinking in the past year? 0  3) How often did you have 6 or more drinks on one occasion in the past year? never    AUDIT-C Score: 0  Interpretation: Score 0-2 (female): Negative screen for alcohol misuse    Single Item Drug Screening:  How often have you used an illegal drug (including marijuana) or a prescription medication for non-medical reasons in the past year? never    Single Item Drug Screen Score: 0  Interpretation: Negative screen for possible drug use disorder    Review of Current Opioid Use  Opioid Risk Tool (ORT) Score: 0  Opioid Risk Tool (ORT) Interpretation: Score 0-3: Low risk for opioid misuse    Other Counseling Topics:   Regular weightbearing exercise and calcium and vitamin D intake.     Social Drivers of Health     Financial Resource Strain: Medium Risk (10/2/2023)    Overall Financial Resource Strain (CARDIA)     Difficulty of Paying Living Expenses: Somewhat hard   Food Insecurity: No Food Insecurity (2023)    Hunger Vital Sign     Worried About Running Out of Food in the Last Year: Never true     Ran Out of Food in the Last Year: Never true   Transportation Needs: No Transportation Needs (10/2/2023)    PRAPARE - Transportation     Lack of Transportation (Medical): No     Lack of Transportation (Non-Medical): No   Housing Stability: Low Risk  (2023)    Housing Stability Vital Sign     Unable to Pay for Housing in the Last Year: No     Number of Times Moved in the Last Year: 1     Homeless in the Last Year: No    Received from [facility]     No results found.    Objective   /76 (BP Location: Right arm, Patient Position: Sitting, Cuff Size: Large)   Pulse 63   Temp 97.9 °F (36.6 °C) (Temporal)   Ht 5' 4\" (1.626 m)   Wt 91.6 kg (202 lb)   LMP  (LMP Unknown)   SpO2 99%   BMI 34.67 " kg/m²     Physical Exam  Cardiovascular:      Pulses: no weak pulses.           Dorsalis pedis pulses are 2+ on the right side and 2+ on the left side.        Posterior tibial pulses are 2+ on the right side and 2+ on the left side.   Musculoskeletal:        Feet:    Feet:      Right foot:      Skin integrity: No ulcer, skin breakdown, erythema, warmth, callus or dry skin.      Left foot:      Skin integrity: No ulcer, skin breakdown, erythema, warmth, callus or dry skin.

## 2024-12-05 NOTE — TELEPHONE ENCOUNTER
PT IS ASKING IF THE DR CAN SEND IN Chlorhexidine (Peridex) FOR HER  TO [facility] [place] [address]

## 2024-12-06 ENCOUNTER — TELEPHONE (OUTPATIENT)
Age: 64
End: 2024-12-06

## 2024-12-06 ENCOUNTER — PATIENT OUTREACH (OUTPATIENT)
Dept: CASE MANAGEMENT | Facility: OTHER | Age: 64
End: 2024-12-06

## 2024-12-06 DIAGNOSIS — Z59.9 FINANCIAL DIFFICULTIES: Primary | ICD-10-CM

## 2024-12-06 SDOH — ECONOMIC STABILITY - INCOME SECURITY: PROBLEM RELATED TO HOUSING AND ECONOMIC CIRCUMSTANCES, UNSPECIFIED: Z59.9

## 2024-12-06 NOTE — TELEPHONE ENCOUNTER
Pharmacy called to advise that there is an interaction between diltiazem (CARDIZEM CD) 120 mg  and amiodarone 100 mg tablet as a major interaction and can cause low heart rate, sinus arrest and Av block. Please advise

## 2024-12-06 NOTE — PROGRESS NOTES
NELLY DEE received a referral from patient's PCP regarding patient's need for social work follow up due to financial difficulty. NELLY DEE attempted to contact patient at this time. LVM requesting a call in return at patient's earliest convenience.Will continue attempts to reach patient.     --    Call received in return from pt. Pt states she is having financial difficulty with medical needs. Patient reports having medical bills she cannot afford which has been causing her to have some stress. Patient states she used to have Medical Assistance as a secondary insurance, but it was revoked in March. NELLY DEE did review income eligibility for MA and determined pt is not eligible financially. Patient also expressed having some medications that are rather expensive and difficult for her to afford. NELLY DEE reviewed income eligibility for PACE and PACE Net and determined pt may be eligible for PACE Net. Patient receives SNAP but is interested in obtaining a list of local food gonzalez/pantries as well. NELLY DEE agreed to send pt a list of resources via [employer]. Patient denies interest in establishing mental health services at this time, but does wish to have some resources sent to her via email should she later decide to pursue talk therapy. Pt reports being independent with ADLs. No transportation barrier as pt is able to drive herself and does have her own vehicle.     [facility] referral placed at this time for assistance with [employer] and PACE Net applications. NELLY DEE encouraged pt to reach out for additional support as needed.

## 2024-12-09 ENCOUNTER — PATIENT OUTREACH (OUTPATIENT)
Dept: CASE MANAGEMENT | Facility: OTHER | Age: 64
End: 2024-12-09

## 2024-12-09 NOTE — TELEPHONE ENCOUNTER
Left message for patient to call back to see who the patient cardiologist is so the pharmacy can talk to the cardiologist.      Spoke to Fernando the Pharmacist she states she will tellthe patient to contact Cardiologist

## 2024-12-09 NOTE — PROGRESS NOTES
received referral from nimo to assist pt with applying for PACE/PACENET and [facility] [employer]. OC name has been added to the care plan.    OC placed outreach call to pt and introduced myself and stated the reason for my call.  Pt informed OC that she would like the assistance in applying for PACE due to her current coverage medications are not being covered.  OC went over pt income and pt agreed for OC to apply for PACE via the website.    OC also discussed the abdullahi care application and pt stated that she did receive an application in the mail and will complete it and attach any documents that are necessary and will not need assistance in completing.    OC has submitted the PACE application and will follow up in one week with pt.

## 2024-12-10 ENCOUNTER — TELEPHONE (OUTPATIENT)
Age: 64
End: 2024-12-10

## 2024-12-10 DIAGNOSIS — K03.81 CRACKED TOOTH: Primary | ICD-10-CM

## 2024-12-10 DIAGNOSIS — I48.0 PAROXYSMAL ATRIAL FIBRILLATION (HCC): ICD-10-CM

## 2024-12-10 RX ORDER — AMIODARONE HYDROCHLORIDE 100 MG/1
50 TABLET ORAL DAILY
Start: 2024-12-10

## 2024-12-10 NOTE — TELEPHONE ENCOUNTER
Has a root canal on left lower jaw that cracked and is requesting a referral to an oral surgeon.   Gave patient phone number for [facility] and transferred her that office to schedule an appointment. Could Dr. Ayoub please place a referral if needed?

## 2024-12-11 ENCOUNTER — TELEPHONE (OUTPATIENT)
Age: 64
End: 2024-12-11

## 2024-12-11 ENCOUNTER — TELEPHONE (OUTPATIENT)
Dept: CARDIOLOGY CLINIC | Facility: CLINIC | Age: 64
End: 2024-12-11

## 2024-12-11 NOTE — TELEPHONE ENCOUNTER
Caller: Adina Jones    Doctor: Dr Lynn    Reason for call: Patient called stating she needs a tooth pulled asap and needs to know if she needs to stop her eliquis before hand.     Call back#: [phone]

## 2024-12-11 NOTE — TELEPHONE ENCOUNTER
Follow up with patient from approximately 2 months ago regarding cost of leqvio and further testing.  Per Dr. Lynn (he spoke to patient)  Leqvio was too expensive for her.  I discussed with her last visit to sign up for the DNA answers program and get her genetic testing complete to look for FH.  She was interested, but I do not see that she has this completed.  Can you please remind her to get this done?  If she is gene positive, it will be easier to get her medications covered, thank you.     Provided patient with phone number and web site for MeMeMe.    She will start the process of genetic testing.

## 2024-12-12 PROBLEM — N30.00 ACUTE CYSTITIS WITHOUT HEMATURIA: Status: RESOLVED | Noted: 2024-11-12 | Resolved: 2024-12-12

## 2024-12-12 NOTE — TELEPHONE ENCOUNTER
Placed call to patient. She verbalized understanding and has no further questions or concerns at this time.

## 2024-12-13 ENCOUNTER — TELEPHONE (OUTPATIENT)
Age: 64
End: 2024-12-13

## 2024-12-13 ENCOUNTER — OFFICE VISIT (OUTPATIENT)
Age: 64
End: 2024-12-13
Payer: COMMERCIAL

## 2024-12-13 VITALS
DIASTOLIC BLOOD PRESSURE: 82 MMHG | BODY MASS INDEX: 35 KG/M2 | TEMPERATURE: 97.7 F | SYSTOLIC BLOOD PRESSURE: 110 MMHG | HEART RATE: 63 BPM | WEIGHT: 205 LBS | OXYGEN SATURATION: 94 % | HEIGHT: 64 IN

## 2024-12-13 DIAGNOSIS — N30.90 CYSTITIS: Primary | ICD-10-CM

## 2024-12-13 DIAGNOSIS — J06.9 ACUTE URI: ICD-10-CM

## 2024-12-13 PROCEDURE — 99213 OFFICE O/P EST LOW 20 MIN: CPT | Performed by: FAMILY MEDICINE

## 2024-12-13 PROCEDURE — G2211 COMPLEX E/M VISIT ADD ON: HCPCS | Performed by: FAMILY MEDICINE

## 2024-12-13 RX ORDER — LEVOFLOXACIN 500 MG/1
500 TABLET, FILM COATED ORAL EVERY 24 HOURS
Qty: 5 TABLET | Refills: 0 | Status: SHIPPED | OUTPATIENT
Start: 2024-12-13 | End: 2024-12-18

## 2024-12-13 RX ORDER — CEFDINIR 300 MG/1
300 CAPSULE ORAL EVERY 12 HOURS SCHEDULED
Qty: 14 CAPSULE | Refills: 0 | Status: SHIPPED | OUTPATIENT
Start: 2024-12-13 | End: 2024-12-20

## 2024-12-13 NOTE — PROGRESS NOTES
Assessment/Plan:       Diagnoses and all orders for this visit:    Cystitis  Comments:  Levaquin as directed.  Patient unable to give UA CNS.  Orders:  -     levofloxacin (LEVAQUIN) 500 mg tablet; Take 1 tablet (500 mg total) by mouth every 24 hours for 5 days    Acute URI  Comments:  Levaquin as directed.  Orders:  -     levofloxacin (LEVAQUIN) 500 mg tablet; Take 1 tablet (500 mg total) by mouth every 24 hours for 5 days            Subjective:        Patient ID: Nancy Jones is a 64 y.o. female.      Patient is here with sore throat over the past day.  Patient with cough.  Patient with green sputum production.  Patient with some difficulty breathing.  Patient to use Breo.  Patient did have some ear discomfort associate with this.  Patient also with dysuria over the past 3 days or so.  Some foul-smelling urine noted.    Sore Throat   Associated symptoms include ear pain.   Earache   Associated symptoms include a sore throat.         The following portions of the patient's history were reviewed and updated as appropriate: allergies, current medications, past family history, past medical history, past social history, past surgical history and problem list.      Review of Systems   Constitutional: Negative.    HENT:  Positive for ear pain and sore throat.    Eyes: Negative.    Respiratory: Negative.     Cardiovascular: Negative.    Gastrointestinal: Negative.    Endocrine: Negative.    Genitourinary:  Positive for dysuria.   Musculoskeletal: Negative.    Skin: Negative.    Allergic/Immunologic: Negative.    Neurological: Negative.    Hematological: Negative.    Psychiatric/Behavioral: Negative.             Objective:        Depression Screening and Follow-up Plan: Patient was screened for depression during today's encounter. They screened negative with a PHQ-2 score of 0.    Falls Plan of Care: balance, strength, and gait training instructions were provided.             /82 (BP Location: Right arm, Patient  "Position: Sitting, Cuff Size: Large)   Pulse 63   Temp 97.7 °F (36.5 °C) (Temporal)   Ht 5' 4\" (1.626 m)   Wt 93 kg (205 lb)   LMP  (LMP Unknown)   SpO2 94%   BMI 35.19 kg/m²          Physical Exam  Vitals and nursing note reviewed.   Constitutional:       General: She is not in acute distress.     Appearance: Normal appearance. She is not ill-appearing, toxic-appearing or diaphoretic.   HENT:      Head: Normocephalic and atraumatic.      Right Ear: Tympanic membrane, ear canal and external ear normal. There is no impacted cerumen.      Left Ear: Tympanic membrane, ear canal and external ear normal. There is no impacted cerumen.      Nose: Nose normal. No congestion or rhinorrhea.      Mouth/Throat:      Mouth: Mucous membranes are moist.      Pharynx: Oropharyngeal exudate and posterior oropharyngeal erythema present.   Eyes:      General: No scleral icterus.        Right eye: No discharge.         Left eye: No discharge.   Neck:      Vascular: No carotid bruit.   Cardiovascular:      Rate and Rhythm: Normal rate and regular rhythm.      Pulses: Normal pulses.      Heart sounds: Normal heart sounds. No murmur heard.     No friction rub. No gallop.   Pulmonary:      Effort: Pulmonary effort is normal. No respiratory distress.      Breath sounds: Normal breath sounds. No stridor. No wheezing, rhonchi or rales.   Chest:      Chest wall: No tenderness.   Musculoskeletal:         General: No swelling, deformity or signs of injury.      Cervical back: Normal range of motion and neck supple. No rigidity. No muscular tenderness.      Right lower leg: No edema.      Left lower leg: No edema.   Lymphadenopathy:      Cervical: No cervical adenopathy.   Skin:     General: Skin is warm and dry.      Capillary Refill: Capillary refill takes less than 2 seconds.      Coloration: Skin is not jaundiced.      Findings: No bruising, erythema, lesion or rash.   Neurological:      Mental Status: She is alert and oriented to " person, place, and time.      Gait: Gait normal.   Psychiatric:         Mood and Affect: Mood normal.         Behavior: Behavior normal.         Thought Content: Thought content normal.         Judgment: Judgment normal.

## 2024-12-13 NOTE — TELEPHONE ENCOUNTER
Pt went to  medication from pharmacy however the pt was informed by the pharmacist that there is an interaction problem with   levofloxacin (LEVAQUIN) and   amiodarone 100 mg tablet .   They understand that the   amiodarone 100 mg tablet  is on hold but because the pharmacist doesn't have written proof of that hold they can't dispense it. Pharmacist is recommending   cefdinir (OMNICEF) 300 mg capsule which pt has taken in the past. Please advise if these changes can be made and please inform pt of decision.

## 2024-12-15 DIAGNOSIS — M54.16 LUMBAR RADICULOPATHY: ICD-10-CM

## 2024-12-16 ENCOUNTER — PATIENT OUTREACH (OUTPATIENT)
Dept: CASE MANAGEMENT | Facility: OTHER | Age: 64
End: 2024-12-16

## 2024-12-16 ENCOUNTER — TELEPHONE (OUTPATIENT)
Dept: NEUROLOGY | Facility: CLINIC | Age: 64
End: 2024-12-16

## 2024-12-16 DIAGNOSIS — R11.0 NAUSEA: ICD-10-CM

## 2024-12-16 DIAGNOSIS — Z90.49 S/P SMALL BOWEL RESECTION: ICD-10-CM

## 2024-12-16 DIAGNOSIS — M54.16 LUMBAR RADICULOPATHY: ICD-10-CM

## 2024-12-16 DIAGNOSIS — G43.711 INTRACTABLE CHRONIC MIGRAINE WITHOUT AURA AND WITH STATUS MIGRAINOSUS: Primary | ICD-10-CM

## 2024-12-16 DIAGNOSIS — F41.9 ANXIETY: ICD-10-CM

## 2024-12-16 RX ORDER — DEXAMETHASONE 4 MG/1
TABLET ORAL
Qty: 5 TABLET | Refills: 0 | Status: SHIPPED | OUTPATIENT
Start: 2024-12-16

## 2024-12-16 RX ORDER — ONDANSETRON 4 MG/1
4 TABLET, ORALLY DISINTEGRATING ORAL EVERY 8 HOURS PRN
Qty: 21 TABLET | Refills: 5 | Status: SHIPPED | OUTPATIENT
Start: 2024-12-16

## 2024-12-16 RX ORDER — METHOCARBAMOL 500 MG/1
1000 TABLET, FILM COATED ORAL 3 TIMES DAILY
Qty: 180 TABLET | Refills: 2 | Status: SHIPPED | OUTPATIENT
Start: 2024-12-16

## 2024-12-16 NOTE — TELEPHONE ENCOUNTER
Patient called the RX Refill Line. Message is being forwarded to the office.     Patient is requesting a refill of Dexamethasone 4 mg 1 tab PO with breakfast for 1 to 5 days for unrelenting migraine.  Please send to  [id number] - [place], [place] - [address].  Patient is out of medication and unable to locate an active order.     Please contact patient at [phone]

## 2024-12-16 NOTE — TELEPHONE ENCOUNTER
Pt last seen by dr araujo on 9/6/23 and no f/u scheduled.    Refill entered, please sign off if agreeable    Scheduling-please contact pt to schedule f/u appt

## 2024-12-16 NOTE — TELEPHONE ENCOUNTER
Reason for call:   [x] Refill   [] Prior Auth  [] Other:     Office:   [] PCP/Provider -   [x] Specialty/Provider - Gastro    Medication: ondansetron (ZOFRAN-ODT) 4 mg disintegrating tablet     Dose/Frequency: Take 1 tablet (4 mg total) by mouth every 8 (eight) hours as needed for nausea     Quantity: 21    Pharmacy: [facility] [address] [place], [place] [address]     Does the patient have enough for 3 days?   [] Yes   [x] No - Send as HP to POD

## 2024-12-16 NOTE — TELEPHONE ENCOUNTER
Please do help make a follow-up appointment as it been over a year and I like to see patients at minimum once a year

## 2024-12-16 NOTE — TELEPHONE ENCOUNTER
"Can you please make patient aware - \"Corticosteroids (Systemic) may enhance the adverse/toxic effect of Quinolones. Specifically, the risk of tendonitis and tendon rupture may be increased.\"  "

## 2024-12-16 NOTE — PROGRESS NOTES
placed call to pt for follow up regarding the status of the the PACE application.    Pt has informed OC that she was denied and cannot remember the reason for the denial. OC stated to pt that  I will call PACE to gather the information and I will call her back.     OC placed call to PACE and spoke with representative Yvrose who informed me that pt was denied due to her medical assistance.  As Yvrose investigated further she did stated that the pt does not have part D coverage and has put in an appeal and I should check back in one week.    OC called pt back and informed her of the above information from PACE and will let her know the status in one week.

## 2024-12-17 ENCOUNTER — PATIENT OUTREACH (OUTPATIENT)
Dept: CASE MANAGEMENT | Facility: OTHER | Age: 64
End: 2024-12-17

## 2024-12-17 RX ORDER — TRAMADOL HYDROCHLORIDE 50 MG/1
100 TABLET ORAL EVERY 8 HOURS PRN
Qty: 180 TABLET | Refills: 0 | Status: SHIPPED | OUTPATIENT
Start: 2024-12-17

## 2024-12-17 NOTE — TELEPHONE ENCOUNTER
Patient called to request a refill for their tramaddol advised a refill was requested on 12/16 and is pending approval. Patient verbalized understanding and is in agreement.      Pt stated that she's out of her medication and is having pain, she's [facility]

## 2024-12-17 NOTE — PROGRESS NOTES
received call from pt informing that she has been approved for [employer].    Pt also stated that she has completed the [facility] application and will be mailing it back to the financial counselors.    Pt does not need no other resource or assistance.  OC will close case.

## 2024-12-17 NOTE — TELEPHONE ENCOUNTER
Called pt, made her aware of dr araujo's message.  She states that the antibiotic was changed to cefdinir as the levaquin had a interaction with her amiodarone.  Advised that cefdinir is not a Quinolone.

## 2024-12-18 ENCOUNTER — PATIENT OUTREACH (OUTPATIENT)
Dept: CASE MANAGEMENT | Facility: OTHER | Age: 64
End: 2024-12-18

## 2024-12-18 NOTE — PROGRESS NOTES
NELLY CM will close out case at this time as [facility] application has been completed and pt has been approved for PACENET. NELLY CM will be available if needed, via new order.

## 2024-12-20 ENCOUNTER — NURSE TRIAGE (OUTPATIENT)
Age: 64
End: 2024-12-20

## 2024-12-20 ENCOUNTER — TELEPHONE (OUTPATIENT)
Age: 64
End: 2024-12-20

## 2024-12-20 DIAGNOSIS — S06.0XAA CONCUSSION WITH UNKNOWN LOSS OF CONSCIOUSNESS STATUS, INITIAL ENCOUNTER: Primary | ICD-10-CM

## 2024-12-20 DIAGNOSIS — G43.001 MIGRAINE WITHOUT AURA AND WITH STATUS MIGRAINOSUS, NOT INTRACTABLE: ICD-10-CM

## 2024-12-20 DIAGNOSIS — R11.0 NAUSEA: ICD-10-CM

## 2024-12-20 RX ORDER — PROMETHAZINE HYDROCHLORIDE 25 MG/1
25 TABLET ORAL EVERY 6 HOURS PRN
Qty: 60 TABLET | Refills: 2 | Status: SHIPPED | OUTPATIENT
Start: 2024-12-20

## 2024-12-20 RX ORDER — BUTALBITAL, ACETAMINOPHEN AND CAFFEINE 50; 325; 40 MG/1; MG/1; MG/1
1 TABLET ORAL EVERY 4 HOURS PRN
Qty: 20 TABLET | Refills: 0 | Status: SHIPPED | OUTPATIENT
Start: 2024-12-20

## 2024-12-20 RX ORDER — BUTALBITAL, ACETAMINOPHEN AND CAFFEINE 50; 325; 40 MG/1; MG/1; MG/1
1 TABLET ORAL EVERY 4 HOURS PRN
Qty: 20 TABLET | Refills: 0 | Status: CANCELLED | OUTPATIENT
Start: 2024-12-20

## 2024-12-20 NOTE — TELEPHONE ENCOUNTER
Patient has just completed her visit from the opthalmologist. Patient was told that she is continuing in the concussion state. Patient is asking if the Fioricet can be prescribed. Patient was prescribed the medication in November but did not  the prescription. Patient was on amiodarone but that has since been D/C'd. Patient is asking if the prescription can go to   [id number] [place], [place] - [address] [place] [id number]  Phone: [phone]  Fax: [phone]   Eye doctor is also asking for a referral to therapy for her eyes due to the concussion. I think that [facility] has a concussion team. Please call the patient when the orders are completed. Patient can be reached at [phone]

## 2024-12-20 NOTE — TELEPHONE ENCOUNTER
Patient stated Cardiologist informed her to stop taken Rx;Amiodarone 50 mg. Patient wanted to know if Dr. Ayoub will refill he Rx: Fioricet.  Patient also agreed to PT for concussion.  Please review and advice    Pharmacy Megan Hernandez

## 2024-12-20 NOTE — TELEPHONE ENCOUNTER
"Patient asking if Dr Lynn can give approval for her to take Fioricet since she is off the Amiodarone right now. Pharmacy won't fill the script due to possible interactions.    If approved, can [facility] pharmacy be contacted at [phone].    Please also update Adina at [phone]      Reason for Disposition   Caller has medication question about med NOT prescribed by PCP and triager unable to answer question (e.g., compatibility with other med, storage)     Amiodarone and Fioricet    Answer Assessment - Initial Assessment Questions  1. NAME of MEDICINE: \"What medicine(s) are you calling about?\"      Amiodarone, Fioricet  2. QUESTION: \"What is your question?\" (e.g., double dose of medicine, side effect)      Can someone call the [facility] pharmacy and tell them if it's ok for me to get the Fioricet refilled since I am currently not taking  Amiodarone. They said there is some kind of interaction between them.  3. PRESCRIBER: \"Who prescribed the medicine?\" Reason: if prescribed by specialist, call should be referred to that group.      Dr Lynn for Amiodarone, Dr Asher for Fioricet  4. SYMPTOMS: \"Do you have any symptoms?\" If Yes, ask: \"What symptoms are you having?\"  \"How bad are the symptoms (e.g., mild, moderate, severe)      denies    Protocols used: Medication Question Call-Adult-[place]    "

## 2024-12-24 ENCOUNTER — TELEPHONE (OUTPATIENT)
Age: 64
End: 2024-12-24

## 2024-12-24 DIAGNOSIS — K21.9 GASTROESOPHAGEAL REFLUX DISEASE WITHOUT ESOPHAGITIS: ICD-10-CM

## 2024-12-24 NOTE — TELEPHONE ENCOUNTER
Patient's pharmacy called the RX Refill Line. Message is being forwarded to the office.     Pharmacist wanted to make the dr aware that the fioricet can interact with the amiodarone and diltiazem      Please contact pharmacy at , she'd like a call back so she knows it's ok to fill the fioricet

## 2024-12-24 NOTE — TELEPHONE ENCOUNTER
Called and spoke with [facility] pharmacy. Informed pharmacy to fill prescription as Crystal stated it is OK for patient to take fiorocet.

## 2024-12-26 RX ORDER — CHLORDIAZEPOXIDE HYDROCHLORIDE AND CLIDINIUM BROMIDE 5; 2.5 MG/1; MG/1
CAPSULE ORAL
Qty: 60 CAPSULE | Refills: 2 | Status: SHIPPED | OUTPATIENT
Start: 2024-12-26

## 2024-12-30 ENCOUNTER — TELEPHONE (OUTPATIENT)
Dept: CARDIOLOGY CLINIC | Facility: CLINIC | Age: 64
End: 2024-12-30

## 2024-12-30 DIAGNOSIS — F41.9 ANXIETY: ICD-10-CM

## 2024-12-30 NOTE — TELEPHONE ENCOUNTER
"COLTON from Adina stating her BP's have been elevated lately.  She has been experiencing headaches also but is unsure if it is from her fall and \"hitting head on concrete\" or the BP's being elevated. I asked her if she has been under stress at holidays or if there is anything  different going on and she stated she \"was alone over holidays\" and this may have contributed to her elevated readings.  She is reporting consistent BP's as follows:    172/83 on 12/22  182/100 on 12/23    She quickly read off others over the past few days:    156/88, 146/83, 166/67, 170/89, 142/76    Dr Lynn is unavailable and looking over her medication list, we give her only the Eliquis and Amiodarone. All other medications come from Dr Ayoub. Dr Lynn will be returning the end of the week. Therefore I suggested calling Dr Ayoub's office to report.   Diagnosis on Metoprolol states essential hypertension and this may be able to be adjusted if necessary. Also the headaches she has, it is uncertain the etiology due to recent head injury. She states Dr Ayoub is aware of the fall she had. She will call back if Dr Ayoub feels this needs to be addressed by Cardiology.         "

## 2024-12-31 ENCOUNTER — OFFICE VISIT (OUTPATIENT)
Age: 64
End: 2024-12-31
Payer: COMMERCIAL

## 2024-12-31 VITALS
OXYGEN SATURATION: 96 % | TEMPERATURE: 97.9 F | HEART RATE: 68 BPM | HEIGHT: 64 IN | DIASTOLIC BLOOD PRESSURE: 86 MMHG | BODY MASS INDEX: 34.83 KG/M2 | WEIGHT: 204 LBS | SYSTOLIC BLOOD PRESSURE: 142 MMHG

## 2024-12-31 DIAGNOSIS — K21.9 GASTROESOPHAGEAL REFLUX DISEASE WITHOUT ESOPHAGITIS: ICD-10-CM

## 2024-12-31 DIAGNOSIS — S06.0X0D CONCUSSION WITHOUT LOSS OF CONSCIOUSNESS, SUBSEQUENT ENCOUNTER: ICD-10-CM

## 2024-12-31 DIAGNOSIS — I48.0 PAROXYSMAL ATRIAL FIBRILLATION (HCC): ICD-10-CM

## 2024-12-31 DIAGNOSIS — I10 ESSENTIAL HYPERTENSION: Primary | ICD-10-CM

## 2024-12-31 PROCEDURE — 99214 OFFICE O/P EST MOD 30 MIN: CPT | Performed by: FAMILY MEDICINE

## 2024-12-31 PROCEDURE — G2211 COMPLEX E/M VISIT ADD ON: HCPCS | Performed by: FAMILY MEDICINE

## 2024-12-31 RX ORDER — PREDNISONE 20 MG/1
40 TABLET ORAL DAILY
Qty: 10 TABLET | Refills: 0 | Status: SHIPPED | OUTPATIENT
Start: 2024-12-31 | End: 2025-01-05

## 2024-12-31 RX ORDER — DILTIAZEM HYDROCHLORIDE 180 MG/1
180 CAPSULE, COATED, EXTENDED RELEASE ORAL DAILY
Qty: 30 CAPSULE | Refills: 5 | Status: SHIPPED | OUTPATIENT
Start: 2024-12-31 | End: 2024-12-31 | Stop reason: SDUPTHER

## 2024-12-31 RX ORDER — LORAZEPAM 2 MG/1
2 TABLET ORAL EVERY 8 HOURS PRN
Qty: 90 TABLET | Refills: 0 | Status: SHIPPED | OUTPATIENT
Start: 2024-12-31

## 2024-12-31 RX ORDER — DILTIAZEM HYDROCHLORIDE 180 MG/1
180 CAPSULE, COATED, EXTENDED RELEASE ORAL DAILY
Qty: 30 CAPSULE | Refills: 0 | Status: SHIPPED | OUTPATIENT
Start: 2024-12-31

## 2024-12-31 NOTE — TELEPHONE ENCOUNTER
Recommend patient check with other pharmacies for stock of diltiazem 180 mg so she can take proper dosage.  Can then send medication to different pharmacy.

## 2024-12-31 NOTE — PROGRESS NOTES
Assessment/Plan:       Diagnoses and all orders for this visit:    Essential hypertension  Comments:  Increase Cardizem 180 mg daily.    Paroxysmal atrial fibrillation (HCC)  Comments:  Controlled  Orders:  -     diltiazem (CARDIZEM CD) 180 mg 24 hr capsule; Take 1 capsule (180 mg total) by mouth daily    Concussion without loss of consciousness, subsequent encounter  Comments:  Referral to physical therapy.  Prednisone as directed  Orders:  -     predniSONE 20 mg tablet; Take 2 tablets (40 mg total) by mouth daily for 5 days            Subjective:        Patient ID: Nancy Jones is a 64 y.o. female.      Patient here to follow-up on blood pressure as well as concussion.  Patient's vision still off on the left side.  Patient did see ophthalmology.  Patient is having some pain in her cervical region as well as top of her head.  Some lightheadedness noted.  Patient with history of fall in September hitting the back of her head.  Patient has noticed some weight gain.  Some anxiety noted.    Hypertension  Associated symptoms include headaches and neck pain.   Fall  Associated symptoms include headaches.         The following portions of the patient's history were reviewed and updated as appropriate: allergies, current medications, past family history, past medical history, past social history, past surgical history and problem list.      Review of Systems   Constitutional:  Positive for unexpected weight change.   HENT: Negative.     Eyes:  Positive for visual disturbance.   Respiratory: Negative.     Cardiovascular: Negative.    Gastrointestinal: Negative.    Endocrine: Negative.    Genitourinary: Negative.    Musculoskeletal:  Positive for neck pain.   Skin: Negative.    Allergic/Immunologic: Negative.    Neurological:  Positive for light-headedness and headaches.   Hematological: Negative.    Psychiatric/Behavioral:  The patient is nervous/anxious.            Objective:               /86 (BP Location: Left  "arm, Patient Position: Sitting, Cuff Size: Large)   Pulse 68   Temp 97.9 °F (36.6 °C) (Temporal)   Ht 5' 4\" (1.626 m)   Wt 92.5 kg (204 lb)   LMP  (LMP Unknown)   SpO2 96%   BMI 35.02 kg/m²          Physical Exam  Vitals and nursing note reviewed.   Constitutional:       General: She is not in acute distress.     Appearance: Normal appearance. She is not ill-appearing, toxic-appearing or diaphoretic.   HENT:      Head: Normocephalic and atraumatic.      Right Ear: Tympanic membrane, ear canal and external ear normal. There is no impacted cerumen.      Left Ear: Tympanic membrane, ear canal and external ear normal. There is no impacted cerumen.      Nose: Nose normal. No congestion or rhinorrhea.      Mouth/Throat:      Mouth: Mucous membranes are moist.      Pharynx: No oropharyngeal exudate or posterior oropharyngeal erythema.   Eyes:      General: No scleral icterus.        Right eye: No discharge.         Left eye: No discharge.   Neck:      Vascular: No carotid bruit.   Cardiovascular:      Rate and Rhythm: Normal rate and regular rhythm.      Pulses: Normal pulses.      Heart sounds: Normal heart sounds. No murmur heard.     No friction rub. No gallop.   Pulmonary:      Effort: Pulmonary effort is normal. No respiratory distress.      Breath sounds: Normal breath sounds. No stridor. No wheezing, rhonchi or rales.   Chest:      Chest wall: No tenderness.   Abdominal:      General: Abdomen is flat. Bowel sounds are normal. There is no distension.      Palpations: Abdomen is soft.      Tenderness: There is no abdominal tenderness. There is no guarding or rebound.   Musculoskeletal:         General: No swelling, tenderness, deformity or signs of injury. Normal range of motion.      Cervical back: Normal range of motion and neck supple. No rigidity. No muscular tenderness.      Right lower leg: No edema.      Left lower leg: No edema.   Lymphadenopathy:      Cervical: No cervical adenopathy.   Skin:     " General: Skin is warm and dry.      Capillary Refill: Capillary refill takes less than 2 seconds.      Coloration: Skin is not jaundiced.      Findings: No bruising, erythema, lesion or rash.   Neurological:      Mental Status: She is alert and oriented to person, place, and time. Mental status is at baseline.      Cranial Nerves: No cranial nerve deficit.      Sensory: No sensory deficit.      Motor: No weakness.      Coordination: Coordination normal.      Gait: Gait normal.   Psychiatric:         Mood and Affect: Mood normal.         Behavior: Behavior normal.         Thought Content: Thought content normal.         Judgment: Judgment normal.

## 2024-12-31 NOTE — TELEPHONE ENCOUNTER
Contacted patient and she states they will not be able to get in the Diltiazem 180 mg at [facility] until Thursday, she does have 240 mg of the diltiazem at home and is wondering if she should take this instead or hold off until they get the script in for 180 mg at [facility] on Thursday? B/p today at visit was 142/86

## 2024-12-31 NOTE — TELEPHONE ENCOUNTER
Patient states the pharmacy called her and they do not have diltalizem available for her. Please call pharmacy and clarify if script needs resent.

## 2025-01-02 RX ORDER — SUCRALFATE 1 G/1
1 TABLET ORAL 4 TIMES DAILY
Qty: 120 TABLET | Refills: 0 | Status: SHIPPED | OUTPATIENT
Start: 2025-01-02

## 2025-01-06 ENCOUNTER — TELEPHONE (OUTPATIENT)
Dept: GASTROENTEROLOGY | Facility: MEDICAL CENTER | Age: 65
End: 2025-01-06

## 2025-01-06 DIAGNOSIS — L30.9 DERMATITIS: ICD-10-CM

## 2025-01-06 NOTE — TELEPHONE ENCOUNTER
Pt called and rescheduled canceled procedure    Scheduled date of colonoscopy (as of today): 4-7-2025  Physician performing colonoscopy: Dr. Bernal  Location of colonoscopy: [facility]   Bowel prep reviewed with patient: pt stated she has prep instruction  Instructions reviewed with patient by: 12-4-2024  Clearances: eliquis

## 2025-01-06 NOTE — TELEPHONE ENCOUNTER
Left pt VM and requested call back to reschedule Colon/EGD for pt with  at [facility] in the next 3 mos.     Pt needs eliquis clearance- Please get this clearance if pt is rescheduled.

## 2025-01-07 RX ORDER — TRIAMCINOLONE ACETONIDE 1 MG/G
OINTMENT TOPICAL
Qty: 15 G | Refills: 2 | Status: SHIPPED | OUTPATIENT
Start: 2025-01-07

## 2025-01-08 ENCOUNTER — TELEPHONE (OUTPATIENT)
Dept: UROLOGY | Facility: CLINIC | Age: 65
End: 2025-01-08

## 2025-01-08 DIAGNOSIS — N30.10 INTERSTITIAL CYSTITIS: Primary | ICD-10-CM

## 2025-01-09 DIAGNOSIS — G43.711 INTRACTABLE CHRONIC MIGRAINE WITHOUT AURA AND WITH STATUS MIGRAINOSUS: ICD-10-CM

## 2025-01-09 DIAGNOSIS — N30.90 CYSTITIS: Primary | ICD-10-CM

## 2025-01-09 RX ORDER — DEXAMETHASONE 4 MG/1
TABLET ORAL
Qty: 5 TABLET | Refills: 0 | Status: CANCELLED | OUTPATIENT
Start: 2025-01-09

## 2025-01-09 RX ORDER — PHENAZOPYRIDINE HYDROCHLORIDE 200 MG/1
200 TABLET, FILM COATED ORAL
Qty: 10 TABLET | Refills: 0 | Status: SHIPPED | OUTPATIENT
Start: 2025-01-09

## 2025-01-09 NOTE — TELEPHONE ENCOUNTER
Patient contacted the office this morning requesting a refill on the Pyridium.She states that she had refills on medication, but [facility] pharmacy relayed this was canceled. No longer on active medication list. Please verify if okay to fill at this time. Please contact patient back with an update, thank you.

## 2025-01-09 NOTE — TELEPHONE ENCOUNTER
Contacted patient and made her aware that Keflex course is complete and patient is to follow up with Dr. Hobson at the end of the month to discuss plan moving forward. Patient verbalized understanding. She states she is in need of a refill on Pyridium and would like this sent to her [facility] in the meantime.

## 2025-01-09 NOTE — TELEPHONE ENCOUNTER
Medication: dexamethasone (DECADRON) 4 mg tablet     Dose/Frequency: 1 tab PO with breakfast for 1 to 5 days for unrelenting migraine     Quantity:  5 tablet     Pharmacy: [facility] [place] [id number] - [place], [place] - [facility] [place] [address]     Office:   [] PCP/Provider -   [x] Speciality/Provider -     Does the patient have enough for 3 days?   [] Yes   [x] No - Send as HP to POD

## 2025-01-09 NOTE — TELEPHONE ENCOUNTER
Therapy completed after 6 months. She has follow-up with Dr. Hobson on 1/29 to discuss plan moving forward

## 2025-01-10 ENCOUNTER — TELEPHONE (OUTPATIENT)
Age: 65
End: 2025-01-10

## 2025-01-10 RX ORDER — PHENAZOPYRIDINE HYDROCHLORIDE 200 MG/1
200 TABLET, FILM COATED ORAL
Qty: 10 TABLET | Refills: 0 | Status: SHIPPED | OUTPATIENT
Start: 2025-01-10

## 2025-01-10 RX ORDER — DEXAMETHASONE 2 MG/1
TABLET ORAL
Qty: 5 TABLET | Refills: 0 | Status: SHIPPED | OUTPATIENT
Start: 2025-01-10

## 2025-01-10 RX ORDER — TOPIRAMATE 25 MG/1
TABLET, FILM COATED ORAL
Qty: 120 TABLET | Refills: 4 | Status: SHIPPED | OUTPATIENT
Start: 2025-01-10

## 2025-01-10 NOTE — TELEPHONE ENCOUNTER
Called pt, made her aware of below. she states that she is Having headaches/migraine every day since she fell.  She states that she fell Sept 29, 2024.  Fell down the Last 2 steps and she hit her head on concrete.  She states that her PCP sent her to [facility] and they said that she has a concussion.  States that she has been seeing them every month and they keep saying that she still has a concussion.     There is a fax form [facility] for [facility] under media dated 12/20/24 and 10/18/24    She confirmed that PCP did recently prescribe steroids.  She completed this.  It was for 5 days    She is agreeable topamax and agreeable to alternative rescue med     Please advise   [phone]-ok to leave detailed message    I do not see a sooner appt that I am able to schedule pt for.    Urgent add on-can you assist with scheduling pt for a sooner appt

## 2025-01-10 NOTE — TELEPHONE ENCOUNTER
Could you please contact the patient to see how she is doing?  How often is she having headaches and migraines?  Appears I refilled this less than a month ago and ideally recommend steroids to be used no more than 5 days/month due to potential side effects of steroids long-term.  Since we are only 6 days away from 1 month I was considering refilling until on chart review I see she also received steroids from her PCP 12/31/2024 and therefore if I refill this she will have been on steroids 15 out of the past 30 days. If so I would recommend adjusting her longer-term medications rather than steroid which are more the backup option.  Treating the sleep apnea would be the most helpful in the long-term, but trial of topiramate/Topamax could help decrease the number of headache and migraine days she has and I suspect help with other symptoms as well.  Could we also break up a new patient slot to get her in sooner so we can discuss this? (Whomever can help with this.) if she is suffering miserably today, let me know and we could discuss either 1 more refill or alternative rescue agent potentially

## 2025-01-10 NOTE — TELEPHONE ENCOUNTER
Auth renewal needed for chlodiazepoxide-clidinium 5-2.5mg capsules. Patient is to take 1 capsule twice daily as needed. Dispense qty 60 with 2 additional refills. Dx: K21.9, K52.9, K55.9     Stable = 2021  Tried/Failed: carafate, dexilant, dicyclomine, esomeprazole, famotidine, lansoprazole, linzess, omeprazole 20mg, pantoprazole, sucralfate, zantac.  Combo: omeprazole 40mg.   Current auth  2024    [facility]: [id number]  Prescribed by Dr Ayoub

## 2025-01-13 ENCOUNTER — TELEPHONE (OUTPATIENT)
Age: 65
End: 2025-01-13

## 2025-01-13 ENCOUNTER — HOSPITAL ENCOUNTER (OUTPATIENT)
Dept: NON INVASIVE DIAGNOSTICS | Facility: HOSPITAL | Age: 65
Discharge: HOME | End: 2025-01-13
Payer: COMMERCIAL

## 2025-01-13 ENCOUNTER — PATIENT MESSAGE (OUTPATIENT)
Age: 65
End: 2025-01-13

## 2025-01-13 DIAGNOSIS — Z90.49 S/P SMALL BOWEL RESECTION: ICD-10-CM

## 2025-01-13 DIAGNOSIS — M54.16 LUMBAR RADICULOPATHY: ICD-10-CM

## 2025-01-13 DIAGNOSIS — I65.22 CAROTID STENOSIS, ASYMPTOMATIC, LEFT: ICD-10-CM

## 2025-01-13 DIAGNOSIS — K21.9 GASTROESOPHAGEAL REFLUX DISEASE WITHOUT ESOPHAGITIS: ICD-10-CM

## 2025-01-13 DIAGNOSIS — N30.00 ACUTE CYSTITIS WITHOUT HEMATURIA: Primary | ICD-10-CM

## 2025-01-13 DIAGNOSIS — F41.9 ANXIETY: ICD-10-CM

## 2025-01-13 PROCEDURE — 93880 EXTRACRANIAL BILAT STUDY: CPT | Performed by: SURGERY

## 2025-01-13 PROCEDURE — 93880 EXTRACRANIAL BILAT STUDY: CPT

## 2025-01-13 RX ORDER — CEFDINIR 300 MG/1
300 CAPSULE ORAL EVERY 12 HOURS SCHEDULED
Qty: 14 CAPSULE | Refills: 0 | Status: SHIPPED | OUTPATIENT
Start: 2025-01-13 | End: 2025-01-20

## 2025-01-13 NOTE — TELEPHONE ENCOUNTER
Pt called. She wanted to make Dr. Raymond aware that she is choosing not to start topiramate. She did research, and saw that the medication should not be taken with diltiazem, which she does currently take. Pt also saw potential side effects of nausea and abdominal pain, and pt does not want to risk that. She had a bowel resection 7/19/23, and still battles with nausea. Pt will see Sleep Medicine again and try to treat the sleep apnea. Pt stated that the provider recommended several sleep med providers at her last OV. Sent pt a message through mPortal with the names of the providers that were listed in the OV note from 9/6/23.

## 2025-01-13 NOTE — TELEPHONE ENCOUNTER
Patient contacted the office this morning in regards to acute cystitis flare up.Started on Friday evening. She is asking if pcp could prescribe cefdinir (OMNICEF) 300 mg capsule to help(which has been , send to [facility]. Please contact patient back with an update, thank you.

## 2025-01-13 NOTE — TELEPHONE ENCOUNTER
PA for chlordiazepoxide-clidinium 5-2.5mg capsule SUBMITTED to Pamela    via    []CMM-KEY:   []Surescripts-Case ID #   []Availity-Auth ID # NDC #   []Faxed to plan   [x]Other website-PromptPA: [id number]  []Phone call Case ID #     [x]PA sent as URGENT    All office notes, labs and other pertaining documents and studies sent. Clinical questions answered. Awaiting determination from insurance company.     Turnaround time for your insurance to make a decision on your Prior Authorization can take 7-21 business days.

## 2025-01-14 ENCOUNTER — TELEPHONE (OUTPATIENT)
Age: 65
End: 2025-01-14

## 2025-01-14 RX ORDER — OMEPRAZOLE 40 MG/1
40 CAPSULE, DELAYED RELEASE ORAL DAILY
Qty: 90 CAPSULE | Refills: 1 | Status: SHIPPED | OUTPATIENT
Start: 2025-01-14 | End: 2025-07-13

## 2025-01-14 RX ORDER — TRAMADOL HYDROCHLORIDE 50 MG/1
100 TABLET ORAL EVERY 8 HOURS PRN
Qty: 180 TABLET | Refills: 0 | Status: SHIPPED | OUTPATIENT
Start: 2025-01-14

## 2025-01-14 NOTE — TELEPHONE ENCOUNTER
Caller: Nancy Jones    Doctor: Dr. Lynn    Reason for call: Patient called stating that she is in the process of switching insurance companies and they are requiring a letter from her cardiologist stating her diagnosis codes and what medications she is on. The patient said that she can  the letter when it is ready. Please call the patient at [phone]    Call back#: [phone]

## 2025-01-14 NOTE — TELEPHONE ENCOUNTER
Patients GI provider:  Dr. Bernal    Number to return call: [phone]    Reason for call: Pt calling stating she was seen by Dr. Bernal on 12/4/24 they spoke about her having hernia repair post sm bowel resection done 7/20/2023. Before she is able to proceed she must  address some insurance issues and needs a letter indicating her all GI diagnosis, up coming hernia repair and any follow up care/ treatments. Patient is asking this letter complete by the end of the month hoping to schedule in February. / Patinent would like a call once letter complete / Explain message would be sent. / Patient will await cb    Scheduled procedure/appointment date if applicable: 4/7/2024 colonoscopy with Dr. Bernal

## 2025-01-14 NOTE — TELEPHONE ENCOUNTER
Patient called wanting a medical letter for her insurance , but while we were on the phone  call got disconnected.

## 2025-01-14 NOTE — TELEPHONE ENCOUNTER
Patient was returning a call to the office. Tried to reach out via teams to see if any one was available to speak with her. There was no reply, so prolly finishing up with patients for the day.     Patient would appreciate a call back to discuss the letter she needs. She has physical therapy tomorrow. But she will be available up until about 9:30 am when she leaves.

## 2025-01-14 NOTE — TELEPHONE ENCOUNTER
Called and left a voicemail message for patient to return call to the office to discuss letter that she is requesting regarding medications.

## 2025-01-14 NOTE — TELEPHONE ENCOUNTER
Patient called to say that her insurance is requesting a letter from Urology including the patient's diagnosis with codes, and the medications she's being treated with.     Patient is asking for this to be mailed to her home address:     [address] [address] [facility] [address]     Call back if needed [phone]

## 2025-01-14 NOTE — TELEPHONE ENCOUNTER
Patient called in today to request a letter of diagnosis and treatment for Medicaid    She would like these mailed to her home so she can fax herself.    Please advise   Thank you

## 2025-01-14 NOTE — TELEPHONE ENCOUNTER
PA for chlordiazepoxide-clidinium 5-2.5mg capsule APPROVED     Date(s) approved: 01/13/2025-12/31/2025    Case #[id number]     Patient advised by          []Red Karaokehart Message  []Phone call   [x]LMOM  []L/M to call office as no active Communication consent on file  []Unable to leave detailed message as VM not approved on Communication consent       Pharmacy advised by    [x]Fax  []Phone call    Approval letter scanned into Media Yes

## 2025-01-15 ENCOUNTER — TELEPHONE (OUTPATIENT)
Age: 65
End: 2025-01-15

## 2025-01-15 ENCOUNTER — EVALUATION (OUTPATIENT)
Facility: REHABILITATION | Age: 65
End: 2025-01-15
Payer: COMMERCIAL

## 2025-01-15 DIAGNOSIS — G89.29 NECK PAIN, CHRONIC: ICD-10-CM

## 2025-01-15 DIAGNOSIS — G44.309 HEADACHES DUE TO OLD HEAD INJURY: ICD-10-CM

## 2025-01-15 DIAGNOSIS — M54.2 NECK PAIN, CHRONIC: ICD-10-CM

## 2025-01-15 DIAGNOSIS — M54.6 THORACIC BACK PAIN, UNSPECIFIED BACK PAIN LATERALITY, UNSPECIFIED CHRONICITY: ICD-10-CM

## 2025-01-15 DIAGNOSIS — M54.2 NECK PAIN: Primary | ICD-10-CM

## 2025-01-15 DIAGNOSIS — S09.90XS HEADACHES DUE TO OLD HEAD INJURY: ICD-10-CM

## 2025-01-15 DIAGNOSIS — M54.2 CERVICAL PAIN: Primary | ICD-10-CM

## 2025-01-15 DIAGNOSIS — G44.301 INTRACTABLE POST-TRAUMATIC HEADACHE, UNSPECIFIED CHRONICITY PATTERN: ICD-10-CM

## 2025-01-15 DIAGNOSIS — S06.0XAA CONCUSSION WITH UNKNOWN LOSS OF CONSCIOUSNESS STATUS, INITIAL ENCOUNTER: Primary | ICD-10-CM

## 2025-01-15 DIAGNOSIS — R26.89 BALANCE PROBLEM: ICD-10-CM

## 2025-01-15 PROCEDURE — 97163 PT EVAL HIGH COMPLEX 45 MIN: CPT | Performed by: PHYSICAL THERAPIST

## 2025-01-15 NOTE — TELEPHONE ENCOUNTER
Pt calling in to verify frequency of omeprazole that was refilled yesterday. Pt has been taking this BID but new rx states daily. I reviewed with pt per last OV on 12/4/24 pt was to decrease to once a day. Pt understood no further questions

## 2025-01-15 NOTE — TELEPHONE ENCOUNTER
Called and spoke with Adina, who after speaking with her , is completing an application for health care coverage. She requires proof of medical conditions and medications being utilized to treat these conditions. Letter was sent to patient via [employer], however she states she is not able to print or retrieve the letter due to not having a printer/copier. She requests letter be printed and mailed to her confirmed home address on file.

## 2025-01-15 NOTE — TELEPHONE ENCOUNTER
Patients current medication list and snapshot was printed for patient to  and will be at the . L/m for patient that records will be at the front.

## 2025-01-15 NOTE — TELEPHONE ENCOUNTER
Does patient have a specific form that needs to be filled out or does she need a letter written and sent to her?  There are no concerning findings on patient's most recent carotid ultrasound which noted a widely patent left carotid stent and mild plaque on her right carotid.

## 2025-01-15 NOTE — PROGRESS NOTES
PT Evaluation     Today's date: 1/15/2025  Patient name: Nancy Jones  : 1960  MRN: [id number]  Referring provider: Coy Ayoub DO  Dx:   Encounter Diagnosis     ICD-10-CM    1. Concussion with unknown loss of consciousness status, initial encounter  S06.0XAA Ambulatory Referral to Physical Therapy      2. Intractable post-traumatic headache, unspecified chronicity pattern  G44.301       3. Neck pain, chronic  M54.2     G89.29       4. Balance problem  R26.89           Start Time: 1030  Stop Time: 1115  Total time in clinic (min): 45 minutes    Assessment  Impairments: abnormal gait, abnormal or restricted ROM, abnormal movement, impaired balance, lacks appropriate home exercise program and poor posture     Assessment details: Robert is a pleasant 64 year old female who was referred to Out patient physical therapy s/p fall down the steps in her hoe with residual neck and headache.  She is reporting continued mild confusion.  Upon assessment, she present s with decreased C/S Rom, headache and neck pain, muscle spasm, decreased dynamic balance and low scoring PSFS and high scoring Concussion symptom severity assessment 71.   Vestibular system was assessed with negative Jessica Hallpike and Roll tests.   She will continue to benefit from skilled PT to maximize functional mobility and will benefit from occupational therapy to address her complaints of confusion.  Understanding of Dx/Px/POC: good     Prognosis: good    Goals  SHORT-TERM GOALS: 3-4 weeks  1. Patient will reports decrease neck and headache to 2/10 in 7 consecutive days  2. Patient will be independent with initial HEP  3. Patient will reports decrease in Concussion symptom severity score by 20%  4. Patient will have decreased muscle spasm B c/s musculature mild    LONG-TERM GOALS: 8 weeks  1. Patient will improve PSFS score by 5 points for improved quality of life  2. Patient will be able to ambulate 10  minutes without symptom reproduction  3   "Patient will report resolution on head and neck pain  4. Patient will demonstrate WFL ROM cervical musculature          Plan  Patient would benefit from: OT sarah    Planned therapy interventions: manual therapy, balance, neuromuscular re-education, postural training, strengthening, therapeutic activities, therapeutic exercise and home exercise program    Frequency: 2x week  Duration in weeks: 8  Plan of Care beginning date: 1/15/2025  Plan of Care expiration date: 2025  Treatment plan discussed with: patient      Subjective Evaluation    History of Present Illness  Date of onset: 2024  Mechanism of injury: trauma  Mechanism of injury: Patient was taking the laundry downstairs and while negotiating the second to last step she fell. She hit her left side of her head.  She did not loose consciousness.  She was helped up by a friend.  She had a headache and her eyes were feeling off, \"like squinting\".    She waited a couple of days before she went to see her Primary care physician.  She was referred to Opht. And followed x 3 visits.   She is able to perfrom ADLS with increased time. I am more forgetful. I can't get comfortable sleeping  Quality of life: fair    Patient Goals  Patient goals for therapy: decreased pain and independence with ADLs/IADLs    Pain  Current pain ratin  Location: head and neck pain coronal and occipital  Quality: pressure  Relieving factors: medications and ice  Aggravating factors: keyboarding    Social Support  Steps to enter house: yes  1  Stairs in house: no   Lives in: apartment  Lives with: alone    Employment status: not working  Hand dominance: right      Diagnostic Tests  MRI studies: normal  Treatments  No previous or current treatments              Past Medical History:   Diagnosis Date    A-fib ([facility]) 2020    Allergic     Anxiety     Arthritis     Asthma     Back pain     and muscle pain    Bruises easily     Chronic pain disorder     Interstitial pain    Colon polyp  " "   Dental bridge present     Depression     Eczema     GERD (gastroesophageal reflux disease)     Heart murmur     History of blood clots     per pt \"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\"    History of pneumonia     Hives     Hyperlipidemia     Hypertension     Infertility, female     Interstitial cystitis     Migraine     sees neurologist    Motion sickness     Obesity     Palpitations     PONV (postoperative nausea and vomiting)     Premature atrial contractions 11/09/2022    Psychiatric disorder     TIA (transient ischemic attack) 09/2022    per pt --\"had MRI and 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\"    Urinary incontinence     wears Pads    Venous insufficiency 08/01/2017    Wears glasses          Patient-Specific Functional Scale   Task is scored 0 (unable to perform activity) to 10 (ability to perform activity independently)  Activity 1/15/25    Wake up without a headache  5    2.   Be able to clean and o laundry without headache 5         OBJECTIVE:Cervical Spine Examination:    Concussion Symptom Severity Score   22/22 categories  71/132 total score  Resting posture:  forward head, rounded shoulders        Cervical spine active range of motion:   see below   Right Left   Rotation 45 45   Sidebending 10 20   Flexion / extension WFl WFl     Alar ligament stability test    Normal  Vertebrobasilar insufficiency test     Normal    Cervical distraction test    Normal  Craniocervical endurance test (deep neck flexors) Normal    AROM  WFL  MW  grossly 4/5 UE      Palpation; Moderate muscle spasms B Upper tral, levators, suprascapularis L>R     Oculomotor Function    Resting Nystagmus  Normal   Gaze Holding Nystagmus  Normal   Cross-Cover Testing  Normal       VOMS: Not Tested Headache  0-10 Dizziness  0-10 Nausea  0-10 Fogginess  0-10 Comments   Baseline Sx:  6 0 4 0    Smooth Pursuits         Saccades- Horizontal   3   Eye pain 3   Saccades- Vertical         Convergence      (Near Point in cm): " "  Measure 1: ____12__   Measure 2:_12_____   Measure 3:__12____   VOR - Horizontal    5 Blurrinees 3/10    VOR - Vertical     Blurrinees 510    Visual Motion Sensitivity            Positional Vertigo Tests:  Right Left   [place]-Hallpike negative negative   Roll Test negative negative             Flowsheet Rows      Flowsheet Row Most Recent Value   Functional Gait Assessment    Gait Level Surface  1   Change in GaiT Speed 3   Gait with horizontal head turns 2   Gait with vertical head turns 2   Gait and pivot tuen  3   Step over obstacle 2   Gait with narrow base of supprt 0   Gait with eyes closed 2   Ambulating backwards 2   Steps 2   Total score  19   Adult Concussion Symptom Score    Headache 5   Pressure in head 4   Neck pain 5   Nausea or vomiting 5   Dizziness 3   Blurred vision 1   Balance problems 3   Sensitivity to light 3   Sensitivity to noise 4   Feeling slowed down 4   Feeling like \"in a fog\" 2   \"Don't feel right\" 4   Difficulty concentrating 4   Difficulty remembering 5   Fatigue or low energy 3   Confusion 1   Drowsiness 2   Trouble falling asleep 3   More emotional 3   Irritability 2   Sadness 1   Nervous or Anxious 4   Symptom Severity Score 71           Education:   -We have discussed concussions and the natural course of recovery. We have discussed that symptoms from a concussion typically take 2 weeks to resolve, and although sometimes it can feel like concussion symptoms linger on, at this point these symptoms would be related to contributing factors.   - Contributing factors may include:   Prolonged removal from normal routine,  posttraumatic headache,  comorbid injuries, preexisting chronic headaches or migraines, cervicogenic headache, medication overuse headache, preexisting learning disability, history of concussion with prolonged recovery, anxiety or depression, stress, deconditioning,  comorbid medical diagnoses, young age.   - I have recommended gradual return of normal cognitive and " physical activity with safety precautions    Precautions; balance deficits     Specialty Daily Treatment Diary     Exercise Diary  1/15/25     Home exercise program      Cervical spine      Exertion      Orthopedic exercises      Oculomotor VOMS      Vestibular/balance [place] Hallpike  Roll test  FGA           Tammy Talley, PT  1/15/2025

## 2025-01-15 NOTE — TELEPHONE ENCOUNTER
Patient calling requesting a letter for her [place] Application for Benefits, stating proof of medical condition, need for medication therapy if applicable, and need for ongoing routine testing (US every 6 months, etc.).   Patient is requesting the letter be signed by provider and mailed to her house.    Patient is also asking to discuss results from Carotid US on 1/13/25 with provider.   Please review and advise.

## 2025-01-15 NOTE — TELEPHONE ENCOUNTER
Patient called in regards to needing proof of all her medical conditions and medications she is on due to doing a application for her benefits and they are requesting proof. Patient would like to stop in to  when ready.

## 2025-01-15 NOTE — TELEPHONE ENCOUNTER
Patient called in regards to wanting to get a neck x-ray due to when she had her fall and got a concussion she's been having headaches and neck pain.     Patient also wanted to inform provider that she will be starting Physical therapy today.

## 2025-01-16 ENCOUNTER — APPOINTMENT (OUTPATIENT)
Facility: REHABILITATION | Age: 65
End: 2025-01-16
Payer: COMMERCIAL

## 2025-01-16 NOTE — TELEPHONE ENCOUNTER
Patient is having pain in the middle of her back as well and is requesting the thoracic spine as well. Please advise

## 2025-01-16 NOTE — TELEPHONE ENCOUNTER
"Pt called back and stated that she would need a letter written and mailed to her.     Also provided her the results as provided by Lesly, however the patient had questions about what she saw on my chart regarding the portion that stated \"Vertebral artery flow is antegrade however severely diminished suggesting more proximal disease\". Although pt was reassured that her results showed no significant change and patent Left ICA and stent, she is still concerned by what this means. Advised patient would inform provider of concern.   "

## 2025-01-16 NOTE — TELEPHONE ENCOUNTER
Can reassure her, no significant concerns for this finding.  Nothing to do.  If patient would like to discuss further can schedule office visit to discuss.

## 2025-01-17 ENCOUNTER — NURSE TRIAGE (OUTPATIENT)
Age: 65
End: 2025-01-17

## 2025-01-17 DIAGNOSIS — Z00.6 ENCOUNTER FOR EXAMINATION FOR NORMAL COMPARISON OR CONTROL IN CLINICAL RESEARCH PROGRAM: ICD-10-CM

## 2025-01-17 NOTE — TELEPHONE ENCOUNTER
Spoke with patient, advised Dr Lynn's recommendations.  Patient will call PCP. States she feels that it is her anxiety. States she took her normal dose of ativan and it calmed her down. States her eyes feel better.   Patient will record her HR and BP while she is having palpitations and contact our office.

## 2025-01-17 NOTE — TELEPHONE ENCOUNTER
"Reason for Conversation: Pt experiencing double, blurry vision, nauseam piercing headache in the eyes that is getting worse each day.   Pt held the Amiodarone today and vision improved, but the palpitations are returning    Pt refusing ED, stated these symptoms are stemming from the medication and pt has a history of migraines    VS/Weight: (Note: Please include date/time vitals/weight were measured)  /75, 74  142/88, 74    Pain: No    Risk Factors: Hypertension, Afib    Recent relevant testing and date of testing: Stress 1/31/24    Medication:   diltiazem 180 mg increased by PCP 12/31/24  Amiodarone 100 mg- 1/2 tablet daily  Toprol 100 mg  Spironolactone  Lasix     Upcoming Office Visit: Yes, 1/20/25    Last Office Visit: 10/22/24     Reason for Disposition   Double vision    Answer Assessment - Initial Assessment Questions  1. DESCRIPTION: \"How has your vision changed?\" (e.g., complete vision loss, blurred vision, double vision, floaters, etc.)      Blurred double vision  2. LOCATION: \"One or both eyes?\" If one, ask: \"Which eye?\"      both  3. SEVERITY: \"Can you see anything?\" If Yes, ask: \"What can you see?\" (e.g., fine print)      yes  4. ONSET: \"When did this begin?\" \"Did it start suddenly or has this been gradual?\"      Few days ago  5. PATTERN: \"Does this come and go, or has it been constant since it started?\"      Comes and goes  6. PAIN: \"Is there any pain in your eye(s)?\"  (Scale 1-10; or mild, moderate, severe)      no  7. CONTACTS-GLASSES: \"Do you wear contacts or glasses?\"      glasses  8. CAUSE: \"What do you think is causing this visual problem?\"      Pt feels it is from Amiodarone  9. OTHER SYMPTOMS: \"Do you have any other symptoms?\" (e.g., confusion, headache, arm or leg weakness, speech problems)      Headache, nausea    Protocols used: Vision Loss or Change-Adult-[place]    "

## 2025-01-17 NOTE — TELEPHONE ENCOUNTER
"Patient calling back, relayed message from JUD Lindsey: \"Can reassure her, no significant concerns for this finding.  Nothing to do. If patient would like to discuss further can schedule office visit to discuss.\"  Patient verbalized understanding. Pt wanted to schedule 6 month f/u appointment with Dr. García. Appointment scheduled for 3/11/25.   "

## 2025-01-17 NOTE — TELEPHONE ENCOUNTER
Clinical team, please call patient.  Once again recommend ER visit as she is having visual changes including double/blurry vision.  She does not want a go still, she should call her PCP to see if this is related to her migraines.  Okay to hold amiodarone, although I have a low suspicion her symptoms are from this.  It is unusual to stop amiodarone for 1 day and symptoms improved.  Please have her record her heart rate and blood pressure while she is having palpitations and report back to us.

## 2025-01-18 ENCOUNTER — APPOINTMENT (OUTPATIENT)
Dept: RADIOLOGY | Facility: MEDICAL CENTER | Age: 65
End: 2025-01-18
Payer: COMMERCIAL

## 2025-01-18 ENCOUNTER — APPOINTMENT (OUTPATIENT)
Dept: LAB | Facility: MEDICAL CENTER | Age: 65
End: 2025-01-18
Payer: COMMERCIAL

## 2025-01-18 DIAGNOSIS — E78.5 DYSLIPIDEMIA: ICD-10-CM

## 2025-01-18 DIAGNOSIS — N39.0 RECURRENT UTI: Primary | ICD-10-CM

## 2025-01-18 DIAGNOSIS — M54.6 THORACIC BACK PAIN, UNSPECIFIED BACK PAIN LATERALITY, UNSPECIFIED CHRONICITY: ICD-10-CM

## 2025-01-18 DIAGNOSIS — Z00.6 ENCOUNTER FOR EXAMINATION FOR NORMAL COMPARISON OR CONTROL IN CLINICAL RESEARCH PROGRAM: ICD-10-CM

## 2025-01-18 DIAGNOSIS — M54.2 CERVICAL PAIN: ICD-10-CM

## 2025-01-18 LAB
CHOLEST SERPL-MCNC: 343 MG/DL (ref ?–200)
HDLC SERPL-MCNC: 59 MG/DL
LDLC SERPL CALC-MCNC: 224 MG/DL (ref 0–100)
TRIGL SERPL-MCNC: 299 MG/DL (ref ?–150)

## 2025-01-18 PROCEDURE — 72072 X-RAY EXAM THORAC SPINE 3VWS: CPT

## 2025-01-18 PROCEDURE — 80061 LIPID PANEL: CPT

## 2025-01-18 PROCEDURE — 72040 X-RAY EXAM NECK SPINE 2-3 VW: CPT

## 2025-01-18 PROCEDURE — 36415 COLL VENOUS BLD VENIPUNCTURE: CPT

## 2025-01-20 ENCOUNTER — OFFICE VISIT (OUTPATIENT)
Dept: CARDIOLOGY CLINIC | Facility: CLINIC | Age: 65
End: 2025-01-20
Payer: COMMERCIAL

## 2025-01-20 VITALS
BODY MASS INDEX: 35 KG/M2 | HEIGHT: 64 IN | HEART RATE: 60 BPM | SYSTOLIC BLOOD PRESSURE: 118 MMHG | DIASTOLIC BLOOD PRESSURE: 86 MMHG | WEIGHT: 205 LBS

## 2025-01-20 DIAGNOSIS — N18.30 STAGE 3 CHRONIC KIDNEY DISEASE, UNSPECIFIED WHETHER STAGE 3A OR 3B CKD (HCC): ICD-10-CM

## 2025-01-20 DIAGNOSIS — F11.20 OPIOID DEPENDENCE, UNCOMPLICATED (HCC): ICD-10-CM

## 2025-01-20 DIAGNOSIS — I70.223 ATHEROSCLEROSIS OF NATIVE ARTERIES OF EXTREMITIES WITH REST PAIN, BILATERAL LEGS (HCC): ICD-10-CM

## 2025-01-20 DIAGNOSIS — E78.5 DYSLIPIDEMIA: ICD-10-CM

## 2025-01-20 DIAGNOSIS — I11.0 HYPERTENSIVE HEART DISEASE WITH HEART FAILURE (HCC): ICD-10-CM

## 2025-01-20 DIAGNOSIS — E66.01 OBESITY, MORBID (HCC): ICD-10-CM

## 2025-01-20 DIAGNOSIS — K55.069 MESENTERIC ARTERY THROMBOSIS (HCC): ICD-10-CM

## 2025-01-20 DIAGNOSIS — I48.0 PAROXYSMAL ATRIAL FIBRILLATION (HCC): Primary | ICD-10-CM

## 2025-01-20 DIAGNOSIS — E08.49 DIABETES MELLITUS DUE TO UNDERLYING CONDITION WITH OTHER DIABETIC NEUROLOGICAL COMPLICATION (HCC): ICD-10-CM

## 2025-01-20 DIAGNOSIS — I50.33 ACUTE ON CHRONIC DIASTOLIC (CONGESTIVE) HEART FAILURE (HCC): ICD-10-CM

## 2025-01-20 DIAGNOSIS — I48.0 PAROXYSMAL ATRIAL FIBRILLATION (HCC): ICD-10-CM

## 2025-01-20 DIAGNOSIS — I20.9 ANGINA PECTORIS (HCC): ICD-10-CM

## 2025-01-20 PROCEDURE — 99214 OFFICE O/P EST MOD 30 MIN: CPT | Performed by: INTERNAL MEDICINE

## 2025-01-20 PROCEDURE — 93000 ELECTROCARDIOGRAM COMPLETE: CPT | Performed by: INTERNAL MEDICINE

## 2025-01-20 RX ORDER — DILTIAZEM HYDROCHLORIDE 180 MG/1
180 CAPSULE, COATED, EXTENDED RELEASE ORAL DAILY
Qty: 90 CAPSULE | Refills: 3 | Status: SHIPPED | OUTPATIENT
Start: 2025-01-20

## 2025-01-20 RX ORDER — EZETIMIBE 10 MG/1
10 TABLET ORAL DAILY
Qty: 30 TABLET | Refills: 5 | Status: SHIPPED | OUTPATIENT
Start: 2025-01-20

## 2025-01-20 NOTE — PROGRESS NOTES
"      Cardiology             Nancy Jones  1960  [id number]                Assessment/Plan:     Paroxysmal atrial fibrillation, diagnosed on Holter monitor 03/2020, with recurrence during episode of GI bleed 7/2023, now on amiodarone suppression, maintained on Eliquis anticoagulation  Hypertension  Multiple drug intolerances and possible allergies  Probable heterozygous familial hypercholesterolemia, on Repatha  Mild to moderate aortic regurgitation  Obesity   Sleep apnea, compliant with CPAP therapy   Lower extremity edema, on furosemide, resolved              No palpitations.  Sinus rhythm on ECG.  She has decided to discontinue amiodarone completely as it did not make her feel good.  She described symptoms of \"feeling weird\" and also some visual disturbances.  She expresses understanding of the increased risk of recurrent atrial fibrillation off the same.  Lipid panel reviewed, markedly elevated LDL cholesterol noted.  Await results of helix genetic test for FH.  Will resubmit papers to get Repatha covered, based on results of testing.  In the meantime, start ezetimibe 10 mg daily.  She is completely statin intolerant.  Continue anticoagulation with Eliquis, tolerating well  Continue furosemide 40 mg daily, euvolemic by examination  Continue metoprolol succinate 100 twice daily.  Symptoms of palpitations seem well-controlled.  She describes a vague abdominal discomfort that she has adds \"palpitations,\" but have educated her on the actual definition of palpitations which she does not seem to be feeling these days.           Follow-up with me in 2 months.  Patient may postpone this to 6 months if she is feeling well.  She would like to keep her appointment for March to discuss her genetic testing and medical therapy for dyslipidemia.             Interval History:      This is a 64-year-old female diagnosed with paroxysmal atrial fibrillation on Holter monitoring 3/020.  That time echocardiogram had revealed " "mild-to-moderate aortic regurgitation with normal left ventricular systolic function.  Nuclear stress test June 2020 was unremarkable with normal perfusion.     She has hypertension, and has multiple medications listed as allergies including beta-blockers, although she has been taking metoprolol for quite some time.  Although on her allergy list HCTZ states “throat closing,\" she states that actually cause some ankle swelling along with amlodipine.  Atorvastatin caused joint pain, although it is listed in her allergies also as “throat closing. ”  She seems to have had a true allergic reaction to hydralazine and valsartan.  She states valsartan caused angioedema, and she does not remember what happened with hydralazine, although states she thinks it may have been ankle swelling.     She was last seen by Dr. Ray 01/28/2021 at which time she was maintained on metoprolol succinate and spironolactone for hypertension.  At 1 point she was asked to take diltiazem as needed for palpitations.  She has been maintained on Eliquis anticoagulation.       She underwent a Zio monitor 03/2021 at [facility](should be scanned into media section, personally reviewed rhythm strips) revealing normal sinus rhythm with 8 runs of SVT, longest lasting 17 beats.  There were 18 patient triggers, most of which correlated with PACs, short atrial runs, and PVCs.  There was no evidence of atrial fibrillation.     Nuclear stress test 06/2020 was unremarkable     In August 2021 she was hospitalized at [facility] for epigastric and left upper quadrant pain, admitted for superior mesenteric artery thrombosis.  She received heparin drip, and underwent SMA stenting with IR on 08/19/2021.  She was initiated on Plavix, and continued on Eliquis.     During her prior visit 10/15/2021 she was initiated on diltiazem which she later stopped due to headache and nausea.  Subsequent nifedipine was tried which caused headache and nausea as well which she " stopped on her own.  Verapamil caused increase in palpitations.     She went to [facility] ER due to urinary retention and was reported to be in atrial fibrillation.  ZIO monitor 10/2022 revealed episodes of paroxysmal atrial fibrillation which correlated with her triggered symptoms.  Echocardiogram 12/16/2022 demonstrated normal left ejection fraction of 55% with mild mitral and aortic regurgitation.  Nuclear stress test 1/17/2023 demonstrated no evidence of myocardial ischemia.  She was seen by electrophysiology and was initiated on flecainide which she did not tolerate well due to side effects.  She refused ablation, therefore was placed on diltiazem.     On 1/31/2023 she underwent exploration of left carotid artery for CEA, and this was aborted, as the left carotid bifurcation was high and deep in the neck with the hypoglossal nerve overriding the bifurcation.  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     Echocardiogram 2/21/2023 demonstrated left ejection fraction 60% with mild right ventricular dilatation and mild to moderate aortic regurgitation.     During her prior visit 3/20/2023, diltiazem was increased from 120 mg to 180 mg daily in light of recurrent atrial fibrillation with RVR at 111 bpm.  She refused another consultation with electrophysiology.  She was unable to tolerate flecainide due to increased palpitations, therefore was reluctant to try any more medications and refused ablation as well.     She was started on Diamox thereafter by neurology for signs of increased intracranial pressure on MRI.     During Adina's last visit 1/2023 she had complaints of chest discomfort, dyspnea, and palpitations.  Nuclear stress test 1/31/2024 was unremarkable.  Zio monitor 3/6/2024 was within normal limits with symptoms correlating with sinus rhythm only.  Echocardiogram 1/31/2024 demonstrated normal left  "ventricular systolic function, with ejection fraction 65% with mild aortic regurgitation.     She underwent successful left TCAR on 4/29/2024.    She completely stopped amiodarone on 1/17/2025 after she felt some visual disturbances with the same.  She also complains of \"feeling weird\" on amiodarone during a phone call 12/10/2024.  She states lowering the dose of amiodarone did not help.    She presents today for follow-up.  She recently got testing with helix for FH, results of which are pending.  She states she gets palpitations, but describes it as a discomfort in the abdomen just under her diaphragm.  She denies chest discomfort or shortness of breath with overexertion.          Vitals:  Vitals:    01/20/25 1118   BP: 118/86   BP Location: Left arm   Patient Position: Sitting   Cuff Size: Large   Pulse: 60   Weight: 93 kg (205 lb)   Height: 5' 4\" (1.626 m)         Past Medical History:   Diagnosis Date   • A-fib ([facility]) 03/2020   • Allergic    • Anxiety    • Arthritis    • Asthma    • Back pain     and muscle pain   • Bruises easily    • Chronic pain disorder     Interstitial pain   • Colon polyp    • Dental bridge present    • Depression    • Eczema    • GERD (gastroesophageal reflux disease)    • Heart murmur    • History of blood clots     per pt \"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\"   • History of pneumonia    • Hives    • Hyperlipidemia    • Hypertension    • Infertility, female    • Interstitial cystitis    • Migraine     sees neurologist   • Motion sickness    • Obesity    • Palpitations    • PONV (postoperative nausea and vomiting)    • Premature atrial contractions 11/09/2022   • Psychiatric disorder    • TIA (transient ischemic attack) 09/2022    per pt --\"had MRI and 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\"   • Urinary incontinence     wears Pads   • Venous insufficiency 08/01/2017   • Wears glasses      Social History     Socioeconomic History   • Marital status:      Spouse " name: Not on file   • Number of children: 0   • Years of education: Not on file   • Highest education level: Not on file   Occupational History   • Occupation: retired   Tobacco Use   • Smoking status: Former     Current packs/day: 0.00     Average packs/day: 0.5 packs/day for 10.0 years (5.0 ttl pk-yrs)     Types: Cigarettes     Start date:      Quit date: 2019     Years since quittin.0     Passive exposure: Past   • Smokeless tobacco: Never   Vaping Use   • Vaping status: Never Used   Substance and Sexual Activity   • Alcohol use: Not Currently   • Drug use: No   • Sexual activity: Not Currently     Comment: defer   Other Topics Concern   • Not on file   Social History Narrative    Who lives in your home: Alone     What type of home do you live in: Apartment     Age of your home: 1952 built     How long have you been living there: 5 yrs     Type of heat: forced hot air     Type of fuel: electric     What type of jamin is in your bedroom: carpet jamin     Do you have the following in or near your home:    Air products: Humidifier in the bedroom/kitchen     Pests: none     Pets: none     Are pets allowed in bedroom: N/A     Open fields, wooded areas nearby: No     Basement: qlgi-lbjdsdesfver-ghkbz-no mold     Exposure to second hand smoke: No    Central air     Habits:    Caffeine: Hot tea 1 cup daily- ice tea 1 cup in the afternoon    Chocolate: Occasionally      Social Drivers of Health     Financial Resource Strain: Medium Risk (10/2/2023)    Overall Financial Resource Strain (CARDIA)    • Difficulty of Paying Living Expenses: Somewhat hard   Food Insecurity: No Food Insecurity (2024)    Hunger Vital Sign    • Worried About Running Out of Food in the Last Year: Never true    • Ran Out of Food in the Last Year: Never true   Transportation Needs: No Transportation Needs (2024)    PRAPARE - Transportation    • Lack of Transportation (Medical): No    • Lack of Transportation (Non-Medical): No    Physical Activity: Inactive (5/24/2021)    Exercise Vital Sign    • Days of Exercise per Week: 0 days    • Minutes of Exercise per Session: 0 min   Stress: Not on file   Social Connections: Unknown (6/18/2024)    Received from [employer]    Social Connections    • How often do you feel lonely or isolated from those around you? (Adult - for ages 18 years and over): Not on file   Intimate Partner Violence: Not At Risk (5/24/2021)    Humiliation, Afraid, Rape, and Kick questionnaire    • Fear of Current or Ex-Partner: No    • Emotionally Abused: No    • Physically Abused: No    • Sexually Abused: No   Housing Stability: Low Risk  (12/5/2024)    Housing Stability Vital Sign    • Unable to Pay for Housing in the Last Year: No    • Number of Times Moved in the Last Year: 0    • Homeless in the Last Year: No      Family History   Problem Relation Age of Onset   • Lung cancer Mother 60   • Brain cancer Mother 60   • Diabetes Father    • Depression Father    • Other Father         septic   • Allergies Sister    • Hashimoto's thyroiditis Sister    • Abdominal aortic aneurysm Sister    • Diabetes Sister    • No Known Problems Sister    • No Known Problems Maternal Grandmother    • No Known Problems Maternal Grandfather    • No Known Problems Paternal Grandmother    • No Known Problems Paternal Grandfather    • Hodgkin's lymphoma Brother    • No Known Problems Brother    • No Known Problems Maternal Aunt    • Diabetes Other    • Breast cancer Neg Hx      Past Surgical History:   Procedure Laterality Date   • COLONOSCOPY     • DILATION AND CURETTAGE OF UTERUS     • EGD     • EXPLORATORY LAPAROTOMY      for infertility   • EXPLORATORY LAPAROTOMY W/ BOWEL RESECTION N/A 7/19/2023    Procedure: LAPAROTOMY EXPLORATORY W/ BOWEL RESECTION;  Surgeon: Crista Recinos MD;  Location: [facility];  Service: General   • HAND SURGERY      Hand excision of tendon cyst   • CO LAPAROSCOPIC APPENDECTOMY N/A 05/03/2022    Procedure: APPENDECTOMY  LAPAROSCOPIC;  Surgeon: Vin Fields MD;  Location: [facility];  Service: General   • CT LAPS ABD PRTM&OMENTUM DX W/WO SPEC BR/WA SPX N/A 7/19/2023    Procedure: LAPAROSCOPY DIAGNOSTIC, LYSIS OF ADHESIONS, LAPAROSCOPY TAKE TAKEDOWN OF LEFT COLON, EXPLORATORY LAPAROSCOPY, LYSIS OF ADHESIONS, RESECTION OF TRANSVERSE COLON SPLENIC FLEXURE AND DESCENDING COLON , TAKE DOWN OF SPLENIC FLEXURE, SIGMOIDOSCOPY, MOBILIZATION OF RIGHT COLON, PRIMARY ANASTOMOSIS EEA 29, TAP BLOCK;  Surgeon: Crista Recinos MD;  Location: [facility];  Service: General   • CT TCAT IV STENT CRV CRTD ART EMBOLIC PROTECJ Left 4/29/2024    Procedure: ANGIOPLASTY ARTERY CAROTID W/ STENT;  Surgeon: Roberto García DO;  Location: [facility];  Service: Vascular   • CT TEAEC W/PATCH GRF CAROTID VERTB SUBCLAV NECK INC Left 1/31/2023    Procedure: EXPLORATION OF LEFT CAROTID ARTERY; ABORTED ENDARTERECTOMY ARTERY CAROTID;  Surgeon: Roberto García DO;  Location: [facility];  Service: Vascular   • SUPERIOR MESTENTERIC ARTERY STENT     • WISDOM TOOTH EXTRACTION         Current Outpatient Medications:   •  acetaminophen (TYLENOL) 325 mg tablet, Take 2 tablets (650 mg total) by mouth every 6 (six) hours, Disp: , Rfl: 0  •  apixaban (Eliquis) 5 mg, Take 1 tablet (5 mg total) by mouth 2 (two) times a day, Disp: 180 tablet, Rfl: 3  •  cefdinir (OMNICEF) 300 mg capsule, Take 1 capsule (300 mg total) by mouth every 12 (twelve) hours for 7 days, Disp: 14 capsule, Rfl: 0  •  chlordiazepoxide-clidinium (LIBRAX) 5-2.5 mg per capsule, TAKE 1 CAPSULE BY MOUTH TWO TIMES DAILY AS NEEDED FOR INDIGESTION, Disp: 60 capsule, Rfl: 2  •  chlorhexidine (PERIDEX) 0.12 % solution, Apply 15 mL to the mouth or throat 2 (two) times a day, Disp: 120 mL, Rfl: 0  •  dexamethasone (DECADRON) 2 mg tablet, One tab with breakfast (early in day to minimize impact on sleep, with food for stomach protection) for 1-5 days for unrelenting migraine, Disp: 5 tablet, Rfl: 0  •  Diclofenac  Sodium (VOLTAREN) 1 %, Apply 2 g topically 4 (four) times a day, Disp: 100 g, Rfl: 1  •  diltiazem (CARDIZEM CD) 180 mg 24 hr capsule, Take 1 capsule (180 mg total) by mouth daily, Disp: 90 capsule, Rfl: 3  •  Docusate Sodium (COLACE PO), Take by mouth daily at bedtime, Disp: , Rfl:   •  ezetimibe (ZETIA) 10 mg tablet, Take 1 tablet (10 mg total) by mouth daily, Disp: 30 tablet, Rfl: 5  •  fluticasone-vilanterol (BREO ELLIPTA) 200-25 MCG/INH inhaler, Inhale 1 puff daily Rinse mouth after use., Disp: 3 Inhaler, Rfl: 3  •  furosemide (LASIX) 40 mg tablet, Take 1 tablet (40 mg total) by mouth daily, Disp: 90 tablet, Rfl: 1  •  LORazepam (ATIVAN) 1 mg tablet, Take 1 tablet (1 mg total) by mouth 3 (three) times a day, Disp: 90 tablet, Rfl: 0  •  LORazepam (ATIVAN) 2 mg tablet, Take 1 tablet (2 mg total) by mouth every 8 (eight) hours as needed for anxiety, Disp: 90 tablet, Rfl: 0  •  magnesium Oxide (MAG-OX) 400 mg TABS, Take 1 tablet (400 mg total) by mouth daily at bedtime, Disp: 90 tablet, Rfl: 3  •  methocarbamol (ROBAXIN) 500 mg tablet, TAKE TWO TABLETS BY MOUTH THREE TIMES A DAY, Disp: 180 tablet, Rfl: 2  •  metoprolol succinate (TOPROL-XL) 100 mg 24 hr tablet, Take 1 tablet (100 mg total) by mouth 2 (two) times a day, Disp: 180 tablet, Rfl: 1  •  omeprazole (PriLOSEC) 40 MG capsule, Take 1 capsule (40 mg total) by mouth daily, Disp: 90 capsule, Rfl: 1  •  ondansetron (ZOFRAN-ODT) 4 mg disintegrating tablet, Take 1 tablet (4 mg total) by mouth every 8 (eight) hours as needed for nausea, Disp: 21 tablet, Rfl: 5  •  phenazopyridine (PYRIDIUM) 200 mg tablet, Take 1 tablet (200 mg total) by mouth 3 (three) times a day with meals, Disp: 10 tablet, Rfl: 0  •  phenazopyridine (PYRIDIUM) 200 mg tablet, Take 1 tablet (200 mg total) by mouth 3 (three) times a day with meals, Disp: 10 tablet, Rfl: 0  •  polyethylene glycol (MIRALAX) 17 g packet, Take 17 g by mouth daily as needed (constipation), Disp: , Rfl:   •  promethazine  (PHENERGAN) 25 mg tablet, Take 1 tablet (25 mg total) by mouth every 6 (six) hours as needed for nausea or vomiting, Disp: 60 tablet, Rfl: 2  •  spironolactone (ALDACTONE) 25 mg tablet, Take 1 tablet (25 mg total) by mouth 2 (two) times a day, Disp: 180 tablet, Rfl: 1  •  sucralfate (CARAFATE) 1 g tablet, TAKE 1 TABLET BY MOUTH FOUR TIMES DAILY, Disp: 120 tablet, Rfl: 0  •  traMADol (ULTRAM) 50 mg tablet, Take 2 tablets (100 mg total) by mouth every 8 (eight) hours as needed for moderate pain, Disp: 180 tablet, Rfl: 0  •  triamcinolone (KENALOG) 0.1 % ointment, APPLY TOPICALLY TO AFFECTED AREA(S) TWO TIMES DAILY, Disp: 15 g, Rfl: 2  •  butalbital-acetaminophen-caffeine (FIORICET,ESGIC) -40 mg per tablet, Take 1 tablet by mouth every 4 (four) hours as needed for headaches (Patient not taking: Reported on 1/20/2025), Disp: 20 tablet, Rfl: 0  •  famciclovir (FAMVIR) 500 mg tablet, Take 1 tablet (500 mg total) by mouth 3 (three) times a day for 7 days, Disp: 21 tablet, Rfl: 0  •  fexofenadine (ALLEGRA) 180 MG tablet, Take 180 mg by mouth daily (Patient not taking: Reported on 12/31/2024), Disp: , Rfl:   •  topiramate (TOPAMAX) 25 mg tablet, 25 mg p.o. in a.m. and p.m. for 1 week, then increase as tolerated to 25 mg QAM and 50 mg QHS for 1 week and finish at 50 mg in a.m. and p.m. (Patient not taking: Reported on 1/20/2025), Disp: 120 tablet, Rfl: 4    Current Facility-Administered Medications:   •  cyanocobalamin injection 1,000 mcg, 1,000 mcg, Intramuscular, Q30 Days, Coy Ayoub DO, 1,000 mcg at 11/16/23 1151        Review of Systems:  Review of Systems   Constitutional:  Negative for activity change, fever and unexpected weight change.   HENT:  Negative for facial swelling, nosebleeds and voice change.    Respiratory:  Negative for chest tightness, shortness of breath and wheezing.    Cardiovascular:  Negative for chest pain, palpitations and leg swelling.   Gastrointestinal:  Negative for abdominal  distention.   Genitourinary:  Negative for hematuria.   Musculoskeletal:  Negative for arthralgias.   Skin:  Negative for color change, pallor, rash and wound.   Neurological:  Negative for dizziness, seizures, syncope and headaches.   Psychiatric/Behavioral:  Negative for agitation.          Physical Exam:  Physical Exam  Vitals reviewed.   Constitutional:       Appearance: She is well-developed. She is obese.   HENT:      Head: Normocephalic and atraumatic.   Cardiovascular:      Rate and Rhythm: Normal rate and regular rhythm.      Heart sounds: Normal heart sounds.   Pulmonary:      Effort: Pulmonary effort is normal.      Breath sounds: Normal breath sounds.   Abdominal:      Palpations: Abdomen is soft.   Musculoskeletal:         General: Normal range of motion.      Cervical back: Normal range of motion and neck supple.      Right lower leg: No edema.      Left lower leg: No edema.   Skin:     General: Skin is warm and dry.   Neurological:      Mental Status: She is alert and oriented to person, place, and time.   Psychiatric:         Behavior: Behavior normal.         Thought Content: Thought content normal.         Judgment: Judgment normal.         This note was completed in part utilizing [employer] Direct Software.  Grammatical errors, random word insertions, spelling mistakes, and incomplete sentences can be an occasional consequence of this system secondary to software limitations, ambient noise, and hardware issues.  If you have any questions or concerns about the content, text, or information contained within the body of this dictation, please contact the provider for clarification.

## 2025-01-21 ENCOUNTER — OFFICE VISIT (OUTPATIENT)
Facility: REHABILITATION | Age: 65
End: 2025-01-21
Payer: COMMERCIAL

## 2025-01-21 ENCOUNTER — RESULTS FOLLOW-UP (OUTPATIENT)
Age: 65
End: 2025-01-21

## 2025-01-21 ENCOUNTER — TELEPHONE (OUTPATIENT)
Age: 65
End: 2025-01-21

## 2025-01-21 DIAGNOSIS — R26.89 BALANCE PROBLEM: ICD-10-CM

## 2025-01-21 DIAGNOSIS — G89.29 NECK PAIN, CHRONIC: ICD-10-CM

## 2025-01-21 DIAGNOSIS — S06.0XAA CONCUSSION WITH UNKNOWN LOSS OF CONSCIOUSNESS STATUS, INITIAL ENCOUNTER: Primary | ICD-10-CM

## 2025-01-21 DIAGNOSIS — M54.2 NECK PAIN, CHRONIC: ICD-10-CM

## 2025-01-21 DIAGNOSIS — G44.301 INTRACTABLE POST-TRAUMATIC HEADACHE, UNSPECIFIED CHRONICITY PATTERN: ICD-10-CM

## 2025-01-21 PROCEDURE — 97110 THERAPEUTIC EXERCISES: CPT | Performed by: PHYSICAL THERAPIST

## 2025-01-21 PROCEDURE — 97140 MANUAL THERAPY 1/> REGIONS: CPT | Performed by: PHYSICAL THERAPIST

## 2025-01-21 PROCEDURE — 97112 NEUROMUSCULAR REEDUCATION: CPT | Performed by: PHYSICAL THERAPIST

## 2025-01-21 NOTE — TELEPHONE ENCOUNTER
----- Message from Coy Ayoub DO sent at 1/21/2025 10:41 AM EST -----  Call patient.  Patient with some chronic unchanged degenerative changes cervical spine and thoracic spine.

## 2025-01-21 NOTE — TELEPHONE ENCOUNTER
Patient returned call from the office. Read Dr. Ayoub's note to her: Call patient.  Patient with some chronic unchanged degenerative changes cervical spine and thoracic spine.     Patient stated understanding. States she is having pain especially at therapy. Suggested she take her pain med an hour before she goes to PT to help decrease the pain while she is there.

## 2025-01-21 NOTE — PROGRESS NOTES
"Daily Note     Today's date: 2025  Patient name: Nancy Jones  : 1960  MRN: [id number]  Referring provider: Coy Ayoub DO  Dx:   Encounter Diagnosis     ICD-10-CM    1. Concussion with unknown loss of consciousness status, initial encounter  S06.0XAA       2. Intractable post-traumatic headache, unspecified chronicity pattern  G44.301       3. Neck pain, chronic  M54.2     G89.29       4. Balance problem  R26.89           Start Time: 1150  Stop Time: 1230  Total time in clinic (min): 40 minutes    Subjective: I had to rest after the last time I was her, just tired.      Objective: See treatment diary below     Precautions anxiety, depression, back pain                       Neuro Re-Ed         HT 25 x 6       HN 25' x 6       EC 25' x 3       backwards 25' x 3       Tandem 25' x 3       VOR x 1 hor :30 X 3       Vor x 1 dax  :30 x 3       4\" hurdles 4 laps fwd        3 laps sdw       Ther Ex        C/s rotation 10 x:10 each       Lateral flexion 10 x :10 each       Chin tucks 10 x :10       rows nv       Low rows nv                               Manual Therapy        Upper trap, levator, rhomb   Trigger point relase, soft tissue mobs 10 min                                  Access Code: [email or web address]  URL: [email or web address]  Date: 2025  Prepared by: Tammy Diggs    Exercises  - Seated Cervical Rotation AROM  - 2 x daily - 7 x weekly - 5 reps - 10 hold  - Seated Cervical Sidebending AROM  - 2 x daily - 7 x weekly - 5 reps - 10 hold  - Seated Cervical Retraction  - 2 x daily - 7 x weekly - 5 reps - 10 hold  - Standing Shoulder Row with Anchored Resistance  - 1 x daily - 7 x weekly - 2 sets - 10 reps - 5 hold  - Shoulder extension with resistance - Neutral  - 1 x daily - 7 x weekly - 2 sets - 10 reps - 5 hold  Assessment: Tolerated treatment well. Initiated c/s musculature stretches, balance exercises and manual therapy. Issued hard copy of initial HEP. Client reports " cutting back PT to 1x/week due to finances. Discussed importance of compliance with progression of exercise program.  Patient would benefit from continued PT      Plan: Progress treatment as tolerated.   Continue expanding HEP

## 2025-01-22 ENCOUNTER — APPOINTMENT (EMERGENCY)
Dept: CT IMAGING | Facility: HOSPITAL | Age: 65
End: 2025-01-22
Payer: COMMERCIAL

## 2025-01-22 ENCOUNTER — APPOINTMENT (EMERGENCY)
Dept: RADIOLOGY | Facility: HOSPITAL | Age: 65
End: 2025-01-22
Payer: COMMERCIAL

## 2025-01-22 ENCOUNTER — HOSPITAL ENCOUNTER (EMERGENCY)
Facility: HOSPITAL | Age: 65
Discharge: HOME | End: 2025-01-22
Attending: EMERGENCY MEDICINE
Payer: COMMERCIAL

## 2025-01-22 VITALS
HEART RATE: 64 BPM | SYSTOLIC BLOOD PRESSURE: 150 MMHG | RESPIRATION RATE: 18 BRPM | WEIGHT: 215.39 LBS | TEMPERATURE: 97.9 F | DIASTOLIC BLOOD PRESSURE: 64 MMHG | OXYGEN SATURATION: 97 % | BODY MASS INDEX: 36.97 KG/M2

## 2025-01-22 DIAGNOSIS — R07.81 RIB PAIN ON RIGHT SIDE: ICD-10-CM
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
## 2025-01-22 LAB
ANION GAP SERPL CALCULATED.3IONS-SCNC: 9 MMOL/L (ref 4–13)
BASOPHILS # BLD AUTO: 0.06 THOUSANDS/ΜL (ref 0–0.1)
BASOPHILS NFR BLD AUTO: 1 % (ref 0–1)
BUN SERPL-MCNC: 15 MG/DL (ref 5–25)
CALCIUM SERPL-MCNC: 9.4 MG/DL (ref 8.4–10.2)
CHLORIDE SERPL-SCNC: 100 MMOL/L (ref 96–108)
CO2 SERPL-SCNC: 27 MMOL/L (ref 21–32)
CREAT SERPL-MCNC: 0.88 MG/DL (ref 0.6–1.3)
EOSINOPHIL # BLD AUTO: 0.1 THOUSAND/ΜL (ref 0–0.61)
EOSINOPHIL NFR BLD AUTO: 1 % (ref 0–6)
ERYTHROCYTE [DISTWIDTH] IN BLOOD BY AUTOMATED COUNT: 13.6 % (ref 11.6–15.1)
GFR SERPL CREATININE-BSD FRML MDRD: 68 ML/MIN/1.73SQ M
GLUCOSE SERPL-MCNC: 141 MG/DL (ref 65–140)
HCT VFR BLD AUTO: 40 % (ref 34.8–46.1)
HGB BLD-MCNC: 13.2 G/DL (ref 11.5–15.4)
IMM GRANULOCYTES # BLD AUTO: 0.07 THOUSAND/UL (ref 0–0.2)
IMM GRANULOCYTES NFR BLD AUTO: 1 % (ref 0–2)
LYMPHOCYTES # BLD AUTO: 1.72 THOUSANDS/ΜL (ref 0.6–4.47)
LYMPHOCYTES NFR BLD AUTO: 25 % (ref 14–44)
MCH RBC QN AUTO: 29.7 PG (ref 26.8–34.3)
MCHC RBC AUTO-ENTMCNC: 33 G/DL (ref 31.4–37.4)
MCV RBC AUTO: 90 FL (ref 82–98)
MONOCYTES # BLD AUTO: 0.47 THOUSAND/ΜL (ref 0.17–1.22)
MONOCYTES NFR BLD AUTO: 7 % (ref 4–12)
NEUTROPHILS # BLD AUTO: 4.59 THOUSANDS/ΜL (ref 1.85–7.62)
NEUTS SEG NFR BLD AUTO: 65 % (ref 43–75)
NRBC BLD AUTO-RTO: 0 /100 WBCS
PLATELET # BLD AUTO: 250 THOUSANDS/UL (ref 149–390)
PMV BLD AUTO: 8.9 FL (ref 8.9–12.7)
POTASSIUM SERPL-SCNC: 3.9 MMOL/L (ref 3.5–5.3)
RBC # BLD AUTO: 4.45 MILLION/UL (ref 3.81–5.12)
SODIUM SERPL-SCNC: 136 MMOL/L (ref 135–147)
WBC # BLD AUTO: 7.01 THOUSAND/UL (ref 4.31–10.16)

## 2025-01-22 PROCEDURE — 71045 X-RAY EXAM CHEST 1 VIEW: CPT

## 2025-01-22 PROCEDURE — 36415 COLL VENOUS BLD VENIPUNCTURE: CPT | Performed by: PHYSICIAN ASSISTANT

## 2025-01-22 PROCEDURE — 85025 COMPLETE CBC W/AUTO DIFF WBC: CPT | Performed by: PHYSICIAN ASSISTANT

## 2025-01-22 PROCEDURE — 99285 EMERGENCY DEPT VISIT HI MDM: CPT | Performed by: EMERGENCY MEDICINE

## 2025-01-22 PROCEDURE — 73110 X-RAY EXAM OF WRIST: CPT

## 2025-01-22 PROCEDURE — 99284 EMERGENCY DEPT VISIT MOD MDM: CPT

## 2025-01-22 PROCEDURE — 71260 CT THORAX DX C+: CPT

## 2025-01-22 PROCEDURE — 73030 X-RAY EXAM OF SHOULDER: CPT

## 2025-01-22 PROCEDURE — 96374 THER/PROPH/DIAG INJ IV PUSH: CPT

## 2025-01-22 PROCEDURE — 74177 CT ABD & PELVIS W/CONTRAST: CPT

## 2025-01-22 PROCEDURE — 72125 CT NECK SPINE W/O DYE: CPT

## 2025-01-22 PROCEDURE — 70450 CT HEAD/BRAIN W/O DYE: CPT

## 2025-01-22 PROCEDURE — 73060 X-RAY EXAM OF HUMERUS: CPT

## 2025-01-22 PROCEDURE — 96375 TX/PRO/DX INJ NEW DRUG ADDON: CPT

## 2025-01-22 PROCEDURE — 80048 BASIC METABOLIC PNL TOTAL CA: CPT | Performed by: PHYSICIAN ASSISTANT

## 2025-01-22 RX ORDER — KETOROLAC TROMETHAMINE 30 MG/ML
15 INJECTION, SOLUTION INTRAMUSCULAR; INTRAVENOUS ONCE
Status: COMPLETED | OUTPATIENT
Start: 2025-01-22 | End: 2025-01-22

## 2025-01-22 RX ORDER — ACETAMINOPHEN 325 MG/1
975 TABLET ORAL ONCE
Status: COMPLETED | OUTPATIENT
Start: 2025-01-22 | End: 2025-01-22

## 2025-01-22 RX ORDER — ONDANSETRON 2 MG/ML
4 INJECTION INTRAMUSCULAR; INTRAVENOUS ONCE
Status: COMPLETED | OUTPATIENT
Start: 2025-01-22 | End: 2025-01-22

## 2025-01-22 RX ADMIN — ONDANSETRON 4 MG: 2 INJECTION INTRAMUSCULAR; INTRAVENOUS at 12:19

## 2025-01-22 RX ADMIN — MORPHINE SULFATE 2 MG: 2 INJECTION, SOLUTION INTRAMUSCULAR; INTRAVENOUS at 12:14

## 2025-01-22 RX ADMIN — IOHEXOL 100 ML: 350 INJECTION, SOLUTION INTRAVENOUS at 13:02

## 2025-01-22 RX ADMIN — KETOROLAC TROMETHAMINE 15 MG: 30 INJECTION, SOLUTION INTRAMUSCULAR; INTRAVENOUS at 15:08

## 2025-01-22 RX ADMIN — ACETAMINOPHEN 975 MG: 325 TABLET, FILM COATED ORAL at 15:08

## 2025-01-22 NOTE — ED PROVIDER NOTES
Time reflects when diagnosis was documented in both MDM as applicable and the Disposition within this note       Time User Action Codes Description Comment    1/22/2025  3:33 PM Wendy Carpenter Add [W19.XXXA] Fall, initial encounter     1/22/2025  3:33 PM Wendy Carpenter Add [R07.81] Rib pain on right side           ED Disposition       ED Disposition   Discharge    Condition   Stable    Date/Time   Wed Jan 22, 2025  3:33 PM    Comment   Nancy Jones discharge to home/self care.                   Assessment & Plan       Medical Decision Making  Patient is a 64-year-old female with a past medical history of A-fib on Eliquis presenting to the ER complaining of right-sided flank pain after she fell about 1 hour ago.  Associated rib pain and pain with inspiration.  On exam patient appears uncomfortable secondary to the pain.  Vital signs are within normal limits.  Physical examination reveals tenderness to palpation along the right lateral aspect of ribs, right CVA tenderness, and right upper quadrant.  No bruising noted.  Examination otherwise unremarkable.  I discussed with patient that considering blood thinner use we will check scan of head to rule out intracranial abnormalities.  Will also check CT of the chest abdomen pelvis to rule out any broken ribs or internal bleeding due to trauma and being on a blood thinner.  Will check labs to rule out electrolyte abnormalities and check kidney function.  Patient is understanding and agreeable with plan.  Will give morphine for pain.    Labs unremarkable.  CT of head reveals no acute intracranial abnormalities.  CT C-spine without any traumatic injury.  CT chest abdomen pelvis reveals no acute injury.  It does show multiple hernias none of which appear incarcerated and no evidence of bowel obstruction.  I discussed all results with patient.  She reports that her pain has improved with the medication given in the ER.  I discussed with patient that despite absence of rib  fractures we will have her perform incentive spirometry due to the significant amount of her pain she has.  Discussed supportive care at home for pain.  Advised very close follow-up with PCP for reevaluation and advised strict return precautions to the ER.  Patient is understanding and agreeable with plan.    Problems Addressed:  Fall, initial encounter: acute illness or injury  Rib pain on right side: acute illness or injury    Amount and/or Complexity of Data Reviewed  Labs: ordered.  Radiology: ordered.    Risk  OTC drugs.  Prescription drug management.             Medications   morphine injection 2 mg (2 mg Intravenous Given 1/22/25 1214)   ondansetron (ZOFRAN) injection 4 mg (4 mg Intravenous Given 1/22/25 1219)   iohexol (OMNIPAQUE) 350 MG/ML injection (SINGLE-DOSE) 100 mL (100 mL Intravenous Given 1/22/25 1302)   ketorolac (TORADOL) injection 15 mg (15 mg Intravenous Given 1/22/25 1508)   acetaminophen (TYLENOL) tablet 975 mg (975 mg Oral Given 1/22/25 1508)       ED Risk Strat Scores                          SBIRT 20yo+      Flowsheet Row Most Recent Value   Initial Alcohol Screen: US AUDIT-C     1. How often do you have a drink containing alcohol? 0 Filed at: 01/22/2025 1111   2. How many drinks containing alcohol do you have on a typical day you are drinking?  0 Filed at: 01/22/2025 1111   3b. FEMALE Any Age, or MALE 65+: How often do you have 4 or more drinks on one occassion? 0 Filed at: 01/22/2025 1111   Audit-C Score 0 Filed at: 01/22/2025 1111   UMESH: How many times in the past year have you...    Used an illegal drug or used a prescription medication for non-medical reasons? Never Filed at: 01/22/2025 1111                            History of Present Illness       Chief Complaint   Patient presents with    Fall     Pt reports walking down a step at the [place] and tripped, landing on her R side. C/o Rib pain and sob when trying to breathe in. Denies LOC/Head strike. Takes eliquis for afib daily.   "      Past Medical History:   Diagnosis Date    A-fib ([facility]) 03/2020    Allergic     Anxiety     Arthritis     Asthma     Back pain     and muscle pain    Bruises easily     Chronic pain disorder     Interstitial pain    Colon polyp     Dental bridge present     Depression     Eczema     GERD (gastroesophageal reflux disease)     Heart murmur     History of blood clots     per pt \"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\"    History of pneumonia     Hives     Hyperlipidemia     Hypertension     Infertility, female     Interstitial cystitis     Migraine     sees neurologist    Motion sickness     Obesity     Palpitations     PONV (postoperative nausea and vomiting)     Premature atrial contractions 11/09/2022    Psychiatric disorder     TIA (transient ischemic attack) 09/2022    per pt --\"had MRI and 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\"    Urinary incontinence     wears Pads    Venous insufficiency 08/01/2017    Wears glasses       Past Surgical History:   Procedure Laterality Date    COLONOSCOPY      DILATION AND CURETTAGE OF UTERUS      EGD      EXPLORATORY LAPAROTOMY      for infertility    EXPLORATORY LAPAROTOMY W/ BOWEL RESECTION N/A 7/19/2023    Procedure: LAPAROTOMY EXPLORATORY W/ BOWEL RESECTION;  Surgeon: Crista Recinos MD;  Location: [facility];  Service: General    HAND SURGERY      Hand excision of tendon cyst    MO LAPAROSCOPIC APPENDECTOMY N/A 05/03/2022    Procedure: APPENDECTOMY LAPAROSCOPIC;  Surgeon: Vin Fields MD;  [facility];  Service: General    MO LAPS ABD PRTM&OMENTUM DX W/WO SPEC BR/WA SPX N/A 7/19/2023    Procedure: LAPAROSCOPY DIAGNOSTIC, LYSIS OF ADHESIONS, LAPAROSCOPY TAKE TAKEDOWN OF LEFT COLON, EXPLORATORY LAPAROSCOPY, LYSIS OF ADHESIONS, RESECTION OF TRANSVERSE COLON SPLENIC FLEXURE AND DESCENDING COLON , TAKE DOWN OF SPLENIC FLEXURE, SIGMOIDOSCOPY, MOBILIZATION OF RIGHT COLON, PRIMARY ANASTOMOSIS EEA 29, TAP BLOCK;  Surgeon: Crista Recinos MD;  " Location: [facility];  Service: General    DC TCAT IV STENT CRV CRTD ART EMBOLIC PROTECJ Left 2024    Procedure: ANGIOPLASTY ARTERY CAROTID W/ STENT;  Surgeon: Roberto García DO;  Location: [facility];  Service: Vascular    DC TEAEC W/PATCH GRF CAROTID VERTB SUBCLAV NECK INC Left 2023    Procedure: EXPLORATION OF LEFT CAROTID ARTERY; ABORTED ENDARTERECTOMY ARTERY CAROTID;  Surgeon: Roberto García DO;  Location: [facility];  Service: Vascular    SUPERIOR MESTENTERIC ARTERY STENT      WISDOM TOOTH EXTRACTION        Family History   Problem Relation Age of Onset    Lung cancer Mother 60    Brain cancer Mother 60    Diabetes Father     Depression Father     Other Father         septic    Allergies Sister     Hashimoto's thyroiditis Sister     Abdominal aortic aneurysm Sister     Diabetes Sister     No Known Problems Sister     No Known Problems Maternal Grandmother     No Known Problems Maternal Grandfather     No Known Problems Paternal Grandmother     No Known Problems Paternal Grandfather     Hodgkin's lymphoma Brother     No Known Problems Brother     No Known Problems Maternal Aunt     Diabetes Other     Breast cancer Neg Hx       Social History     Tobacco Use    Smoking status: Former     Current packs/day: 0.00     Average packs/day: 0.5 packs/day for 10.0 years (5.0 ttl pk-yrs)     Types: Cigarettes     Start date:      Quit date: 2019     Years since quittin.0     Passive exposure: Past    Smokeless tobacco: Never   Vaping Use    Vaping status: Never Used   Substance Use Topics    Alcohol use: Not Currently    Drug use: No      E-Cigarette/Vaping    E-Cigarette Use Never User       E-Cigarette/Vaping Substances    Nicotine No     THC No     CBD No     Flavoring No     Other No     Unknown No       I have reviewed and agree with the history as documented.     Patient is a 64-year-old female with a past medical history of A-fib on Eliquis presenting to the ER complaining of right-sided  flank pain after she fell about 1 hour ago.  Patient reports [facility] and fell onto her right side.  Patient reports that her right rib/flank area hit the concrete stair.  She reports that the majority of her pain is in that area and occasionally radiates into her abdomen.  She reports pain is significantly worse with inspiration and movement.  Patient also reports pain at right shoulder and right wrist.  Patient reports that she does not hit her head and currently denies any head or neck pain.  Patient denies any LOC.  Patient reports that she was able to stand up on her own after falling.  Denies any difficulty ambulating.         Review of Systems   Constitutional:  Negative for chills and fever.   HENT:  Negative for ear pain and sore throat.    Eyes:  Negative for pain and visual disturbance.   Respiratory:  Negative for cough and shortness of breath.    Cardiovascular:  Positive for chest pain (rib pain). Negative for palpitations.   Gastrointestinal:  Negative for abdominal pain and vomiting.   Genitourinary:  Positive for flank pain. Negative for dysuria and hematuria.   Musculoskeletal:  Negative for arthralgias and back pain.   Skin:  Negative for color change and rash.   Neurological:  Negative for seizures and syncope.   All other systems reviewed and are negative.          Objective       ED Triage Vitals [01/22/25 1109]   Temperature Pulse Blood Pressure Respirations SpO2 Patient Position - Orthostatic VS   97.9 °F (36.6 °C) 62 164/64 16 94 % Sitting      Temp Source Heart Rate Source BP Location FiO2 (%) Pain Score    Oral Monitor Left arm -- 10 - Worst Possible Pain      Vitals      Date and Time Temp Pulse SpO2 Resp BP Pain Score FACES Pain Rating User   01/22/25 1508 -- -- -- -- -- 9 -- EC   01/22/25 1507 -- 64 97 % 18 150/64 -- --    01/22/25 1214 -- -- -- -- -- 7 --    01/22/25 1109 97.9 °F (36.6 °C) 62 94 % 16 164/64 10 - Worst  Possible Pain -- LB            Physical Exam  Vitals and nursing note reviewed.   Constitutional:       General: She is in acute distress.      Appearance: She is well-developed.   HENT:      Head: Normocephalic and atraumatic.   Eyes:      Conjunctiva/sclera: Conjunctivae normal.   Cardiovascular:      Rate and Rhythm: Normal rate and regular rhythm.      Heart sounds: No murmur heard.  Pulmonary:      Effort: Pulmonary effort is normal. No respiratory distress.      Breath sounds: Normal breath sounds.   Chest:       Abdominal:      Palpations: Abdomen is soft.      Tenderness: There is abdominal tenderness (RUQ). There is right CVA tenderness.   Musculoskeletal:         General: No swelling.      Cervical back: Neck supple.   Skin:     General: Skin is warm and dry.      Capillary Refill: Capillary refill takes less than 2 seconds.   Neurological:      Mental Status: She is alert.   Psychiatric:         Mood and Affect: Mood normal.         Results Reviewed       Procedure Component Value Units Date/Time    Basic metabolic panel [[id number]]  (Abnormal) Collected: 01/22/25 1217    Lab Status: Final result Specimen: Blood from Arm, Right Updated: 01/22/25 1243     Sodium 136 mmol/L      Potassium 3.9 mmol/L      Chloride 100 mmol/L      CO2 27 mmol/L      ANION GAP 9 mmol/L      BUN 15 mg/dL      Creatinine 0.88 mg/dL      Glucose 141 mg/dL      Calcium 9.4 mg/dL      eGFR 68 ml/min/1.73sq m     Narrative:      National Kidney Disease Foundation guidelines for Chronic Kidney Disease (CKD):     Stage 1 with normal or high GFR (GFR > 90 mL/min/1.73 square meters)    Stage 2 Mild CKD (GFR = 60-89 mL/min/1.73 square meters)    Stage 3A Moderate CKD (GFR = 45-59 mL/min/1.73 square meters)    Stage 3B Moderate CKD (GFR = 30-44 mL/min/1.73 square meters)    Stage 4 Severe CKD (GFR = 15-29 mL/min/1.73 square meters)    Stage 5 End Stage CKD (GFR <15 mL/min/1.73 square meters)  Note: GFR calculation is accurate only with a  steady state creatinine    CBC and differential [[id number]] Collected: 01/22/25 1217    Lab Status: Final result Specimen: Blood from Arm, Right Updated: 01/22/25 1222     WBC 7.01 Thousand/uL      RBC 4.45 Million/uL      Hemoglobin 13.2 g/dL      Hematocrit 40.0 %      MCV 90 fL      MCH 29.7 pg      MCHC 33.0 g/dL      RDW 13.6 %      MPV 8.9 fL      Platelets 250 Thousands/uL      nRBC 0 /100 WBCs      Segmented % 65 %      Immature Grans % 1 %      Lymphocytes % 25 %      Monocytes % 7 %      Eosinophils Relative 1 %      Basophils Relative 1 %      Absolute Neutrophils 4.59 Thousands/µL      Absolute Immature Grans 0.07 Thousand/uL      Absolute Lymphocytes 1.72 Thousands/µL      Absolute Monocytes 0.47 Thousand/µL      Eosinophils Absolute 0.10 Thousand/µL      Basophils Absolute 0.06 Thousands/µL             CT head without contrast   Final Interpretation by Mazin Farley MD (01/22 1329)      No acute intracranial abnormality.                  Workstation performed: [id number]         CT spine cervical without contrast   Final Interpretation by Mazin Farley MD (01/22 1333)      No cervical spine fracture or traumatic malalignment.                  Workstation performed: [id number]         CT chest abdomen pelvis w contrast   Final Interpretation by Vin Roberto MD (01/22 1427)         1. No acute traumatic injury detected in the chest, abdomen or pelvis.   2. Hepatic steatosis.   3. Anterior abdominal and pelvic wall hernias, the 2 cephalad hernias containing fat the more caudal hernia containing nondilated loops of small intestine. No evidence of bowel obstruction.   4. Moderate constipation.               Workstation performed: [id number]         XR humerus RIGHT   Final Interpretation by Elliot Benito MD (01/22 1317)      No acute osseous abnormality.         Computerized Assisted Algorithm (CAA) may have been used to analyze all applicable images.         Workstation performed:  [id number]         XR wrist 3+ views RIGHT   Final Interpretation by Elliot Benito MD (01/22 1317)      No acute osseous abnormality.         Computerized Assisted Algorithm (CAA) may have been used to analyze all applicable images.            Workstation performed: [id number]         XR shoulder 2+ views RIGHT   Final Interpretation by Elliot Benito MD (01/22 1317)      No acute osseous abnormality.         Computerized Assisted Algorithm (CAA) may have been used to analyze all applicable images.         Workstation performed: [id number]         XR chest 1 view portable   Final Interpretation by Debra Peterson MD (01/22 1200)   No congestion   No acute consolidation            Workstation performed: [id number]             Procedures    ED Medication and Procedure Management   Prior to Admission Medications   Prescriptions Last Dose Informant Patient Reported? Taking?   Diclofenac Sodium (VOLTAREN) 1 %   No No   Sig: Apply 2 g topically 4 (four) times a day   Docusate Sodium (COLACE PO)  Self Yes No   Sig: Take by mouth daily at bedtime   LORazepam (ATIVAN) 1 mg tablet  Self No No   Sig: Take 1 tablet (1 mg total) by mouth 3 (three) times a day   LORazepam (ATIVAN) 2 mg tablet   No No   Sig: Take 1 tablet (2 mg total) by mouth every 8 (eight) hours as needed for anxiety   acetaminophen (TYLENOL) 325 mg tablet  Self No No   Sig: Take 2 tablets (650 mg total) by mouth every 6 (six) hours   apixaban (Eliquis) 5 mg   No No   Sig: Take 1 tablet (5 mg total) by mouth 2 (two) times a day   butalbital-acetaminophen-caffeine (FIORICET,ESGIC) -40 mg per tablet   No No   Sig: Take 1 tablet by mouth every 4 (four) hours as needed for headaches   Patient not taking: Reported on 1/20/2025   cephalexin (KEFLEX) 250 mg capsule   No No   Sig: Take 1 capsule (250 mg total) by mouth every morning   chlordiazepoxide-clidinium (LIBRAX) 5-2.5 mg per capsule   No No   Sig: TAKE 1 CAPSULE BY MOUTH TWO TIMES DAILY AS NEEDED FOR  INDIGESTION   chlorhexidine (PERIDEX) 0.12 % solution   No No   Sig: Apply 15 mL to the mouth or throat 2 (two) times a day   dexamethasone (DECADRON) 2 mg tablet   No No   Sig: One tab with breakfast (early in day to minimize impact on sleep, with food for stomach protection) for 1-5 days for unrelenting migraine   diltiazem (CARDIZEM CD) 180 mg 24 hr capsule   No No   Sig: Take 1 capsule (180 mg total) by mouth daily   ezetimibe (ZETIA) 10 mg tablet   No No   Sig: Take 1 tablet (10 mg total) by mouth daily   famciclovir (FAMVIR) 500 mg tablet   No No   Sig: Take 1 tablet (500 mg total) by mouth 3 (three) times a day for 7 days   fexofenadine (ALLEGRA) 180 MG tablet  Self Yes No   Sig: Take 180 mg by mouth daily   Patient not taking: Reported on 12/31/2024   fluticasone-vilanterol (BREO ELLIPTA) 200-25 MCG/INH inhaler  Self No No   Sig: Inhale 1 puff daily Rinse mouth after use.   furosemide (LASIX) 40 mg tablet   No No   Sig: Take 1 tablet (40 mg total) by mouth daily   magnesium Oxide (MAG-OX) 400 mg TABS  Self No No   Sig: Take 1 tablet (400 mg total) by mouth daily at bedtime   methocarbamol (ROBAXIN) 500 mg tablet   No No   Sig: TAKE TWO TABLETS BY MOUTH THREE TIMES A DAY   metoprolol succinate (TOPROL-XL) 100 mg 24 hr tablet   No No   Sig: Take 1 tablet (100 mg total) by mouth 2 (two) times a day   omeprazole (PriLOSEC) 40 MG capsule   No No   Sig: Take 1 capsule (40 mg total) by mouth daily   ondansetron (ZOFRAN-ODT) 4 mg disintegrating tablet   No No   Sig: Take 1 tablet (4 mg total) by mouth every 8 (eight) hours as needed for nausea   phenazopyridine (PYRIDIUM) 200 mg tablet   No No   Sig: Take 1 tablet (200 mg total) by mouth 3 (three) times a day with meals   phenazopyridine (PYRIDIUM) 200 mg tablet   No No   Sig: Take 1 tablet (200 mg total) by mouth 3 (three) times a day with meals   polyethylene glycol (MIRALAX) 17 g packet  Self No No   Sig: Take 17 g by mouth daily as needed (constipation)    promethazine (PHENERGAN) 25 mg tablet   No No   Sig: Take 1 tablet (25 mg total) by mouth every 6 (six) hours as needed for nausea or vomiting   spironolactone (ALDACTONE) 25 mg tablet   No No   Sig: Take 1 tablet (25 mg total) by mouth 2 (two) times a day   sucralfate (CARAFATE) 1 g tablet   No No   Sig: TAKE 1 TABLET BY MOUTH FOUR TIMES DAILY   topiramate (TOPAMAX) 25 mg tablet   No No   Si mg p.o. in a.m. and p.m. for 1 week, then increase as tolerated to 25 mg QAM and 50 mg QHS for 1 week and finish at 50 mg in a.m. and p.m.   Patient not taking: Reported on 2025   traMADol (ULTRAM) 50 mg tablet   No No   Sig: Take 2 tablets (100 mg total) by mouth every 8 (eight) hours as needed for moderate pain   triamcinolone (KENALOG) 0.1 % ointment   No No   Sig: APPLY TOPICALLY TO AFFECTED AREA(S) TWO TIMES DAILY      Facility-Administered Medications Last Administration Doses Remaining   cyanocobalamin injection 1,000 mcg 2023 11:51 AM         Discharge Medication List as of 2025  4:08 PM        CONTINUE these medications which have NOT CHANGED    Details   acetaminophen (TYLENOL) 325 mg tablet Take 2 tablets (650 mg total) by mouth every 6 (six) hours, Starting 2023, No Print      apixaban (Eliquis) 5 mg Take 1 tablet (5 mg total) by mouth 2 (two) times a day, Starting 2024, Normal      butalbital-acetaminophen-caffeine (FIORICET,ESGIC) -40 mg per tablet Take 1 tablet by mouth every 4 (four) hours as needed for headaches, Starting 2024, Normal      cephalexin (KEFLEX) 250 mg capsule Take 1 capsule (250 mg total) by mouth every morning, Starting Mon 2025, Until Sun 2025, Normal      chlordiazepoxide-clidinium (LIBRAX) 5-2.5 mg per capsule TAKE 1 CAPSULE BY MOUTH TWO TIMES DAILY AS NEEDED FOR INDIGESTION, Normal      chlorhexidine (PERIDEX) 0.12 % solution Apply 15 mL to the mouth or throat 2 (two) times a day, Starting Thu 2024, Normal       dexamethasone (DECADRON) 2 mg tablet One tab with breakfast (early in day to minimize impact on sleep, with food for stomach protection) for 1-5 days for unrelenting migraine, Normal      Diclofenac Sodium (VOLTAREN) 1 % Apply 2 g topically 4 (four) times a day, Starting Mon 11/11/2024, Normal      diltiazem (CARDIZEM CD) 180 mg 24 hr capsule Take 1 capsule (180 mg total) by mouth daily, Starting Mon 1/20/2025, Normal      Docusate Sodium (COLACE PO) Take by mouth daily at bedtime, Historical Med      ezetimibe (ZETIA) 10 mg tablet Take 1 tablet (10 mg total) by mouth daily, Starting Mon 1/20/2025, Normal      famciclovir (FAMVIR) 500 mg tablet Take 1 tablet (500 mg total) by mouth 3 (three) times a day for 7 days, Starting Thu 11/21/2024, Until Thu 11/28/2024, Normal      fexofenadine (ALLEGRA) 180 MG tablet Take 180 mg by mouth daily, Historical Med      fluticasone-vilanterol (BREO ELLIPTA) 200-25 MCG/INH inhaler Inhale 1 puff daily Rinse mouth after use., Starting Mon 3/1/2021, Normal      furosemide (LASIX) 40 mg tablet Take 1 tablet (40 mg total) by mouth daily, Starting Tue 11/12/2024, Normal      !! LORazepam (ATIVAN) 1 mg tablet Take 1 tablet (1 mg total) by mouth 3 (three) times a day, Starting Thu 5/23/2024, Normal      !! LORazepam (ATIVAN) 2 mg tablet Take 1 tablet (2 mg total) by mouth every 8 (eight) hours as needed for anxiety, Starting Tue 12/31/2024, Normal      magnesium Oxide (MAG-OX) 400 mg TABS Take 1 tablet (400 mg total) by mouth daily at bedtime, Starting Wed 9/6/2023, Normal      methocarbamol (ROBAXIN) 500 mg tablet TAKE TWO TABLETS BY MOUTH THREE TIMES A DAY, Starting Mon 12/16/2024, Normal      metoprolol succinate (TOPROL-XL) 100 mg 24 hr tablet Take 1 tablet (100 mg total) by mouth 2 (two) times a day, Starting Mon 11/11/2024, Normal      omeprazole (PriLOSEC) 40 MG capsule Take 1 capsule (40 mg total) by mouth daily, Starting Tue 1/14/2025, Until Sun 7/13/2025, Normal       ondansetron (ZOFRAN-ODT) 4 mg disintegrating tablet Take 1 tablet (4 mg total) by mouth every 8 (eight) hours as needed for nausea, Starting Mon 12/16/2024, Normal      !! phenazopyridine (PYRIDIUM) 200 mg tablet Take 1 tablet (200 mg total) by mouth 3 (three) times a day with meals, Starting Thu 1/9/2025, Normal      !! phenazopyridine (PYRIDIUM) 200 mg tablet Take 1 tablet (200 mg total) by mouth 3 (three) times a day with meals, Starting Fri 1/10/2025, Normal      polyethylene glycol (MIRALAX) 17 g packet Take 17 g by mouth daily as needed (constipation), Starting Thu 5/2/2024, OTC      promethazine (PHENERGAN) 25 mg tablet Take 1 tablet (25 mg total) by mouth every 6 (six) hours as needed for nausea or vomiting, Starting Fri 12/20/2024, Normal      spironolactone (ALDACTONE) 25 mg tablet Take 1 tablet (25 mg total) by mouth 2 (two) times a day, Starting Tue 11/5/2024, Normal      sucralfate (CARAFATE) 1 g tablet TAKE 1 TABLET BY MOUTH FOUR TIMES DAILY, Starting Thu 1/2/2025, Normal      topiramate (TOPAMAX) 25 mg tablet 25 mg p.o. in a.m. and p.m. for 1 week, then increase as tolerated to 25 mg QAM and 50 mg QHS for 1 week and finish at 50 mg in a.m. and p.m., Normal      traMADol (ULTRAM) 50 mg tablet Take 2 tablets (100 mg total) by mouth every 8 (eight) hours as needed for moderate pain, Starting Tue 1/14/2025, Normal      triamcinolone (KENALOG) 0.1 % ointment APPLY TOPICALLY TO AFFECTED AREA(S) TWO TIMES DAILY, Normal       !! - Potential duplicate medications found. Please discuss with provider.        No discharge procedures on file.  ED SEPSIS DOCUMENTATION   Time reflects when diagnosis was documented in both MDM as applicable and the Disposition within this note       Time User Action Codes Description Comment    1/22/2025  3:33 PM Wendy Carpenter Add [W19.XXXA] Fall, initial encounter     1/22/2025  3:33 PM Wendy Carpenter Add [R07.81] Rib pain on right side                  Wendy Carpenter,  NANNETTE  01/22/25 1844

## 2025-01-23 ENCOUNTER — PATIENT OUTREACH (OUTPATIENT)
Dept: CASE MANAGEMENT | Facility: OTHER | Age: 65
End: 2025-01-23

## 2025-01-23 DIAGNOSIS — Z91.89 HIGH RISK FOR READMISSION: Primary | ICD-10-CM

## 2025-01-23 NOTE — PROGRESS NOTES
Received referral via in basket message as covering RN Care Manager. Chart reviewed.  Pt presented to ED due to fall. Pt on Eliquis. Evaluated, pt had complaints of rib pain. No acute injury. Pt to follow up with PCP.  Past medical history of Afib, COPD, CHF, [facility].  Telephone call to patient. Left message on machine with my name and number as well as RN LEILA Paul.

## 2025-01-23 NOTE — PROGRESS NOTES
"Return call received from Adina. She reports she is \"sore\" around her ribs with any movement. Denies shortness of breath. She is taking Tylenol alternated with Tramadol for pain. Verified she has an Incentive Spirometer at home and encouraged to use frequently throughout the day.  AVS reviewed.  Reviewed importance of evaluation after any fall when on Eliquis.  Reminded to call PCP for ED follow up appointment.  Declines need for further telephone calls. Will close referral at this time. Pt has my contact information if future assistance would be needed.  "

## 2025-01-24 ENCOUNTER — APPOINTMENT (OUTPATIENT)
Facility: REHABILITATION | Age: 65
End: 2025-01-24
Payer: COMMERCIAL

## 2025-01-27 NOTE — TELEPHONE ENCOUNTER
Patient called for a refill on diltiazem. Patient made aware that it was sent to the pharmacy during her office visit on 01/20/25. Patient will contact the pharmacy.

## 2025-01-28 ENCOUNTER — APPOINTMENT (OUTPATIENT)
Facility: REHABILITATION | Age: 65
End: 2025-01-28
Payer: COMMERCIAL

## 2025-01-28 ENCOUNTER — OFFICE VISIT (OUTPATIENT)
Age: 65
End: 2025-01-28
Payer: COMMERCIAL

## 2025-01-28 VITALS
DIASTOLIC BLOOD PRESSURE: 64 MMHG | OXYGEN SATURATION: 93 % | HEIGHT: 64 IN | TEMPERATURE: 97.4 F | WEIGHT: 210 LBS | BODY MASS INDEX: 35.85 KG/M2 | SYSTOLIC BLOOD PRESSURE: 118 MMHG | HEART RATE: 63 BPM

## 2025-01-28 DIAGNOSIS — K21.9 GASTROESOPHAGEAL REFLUX DISEASE WITHOUT ESOPHAGITIS: ICD-10-CM

## 2025-01-28 DIAGNOSIS — J43.9 PULMONARY EMPHYSEMA, UNSPECIFIED EMPHYSEMA TYPE (HCC): ICD-10-CM

## 2025-01-28 DIAGNOSIS — F11.20 OPIOID DEPENDENCE, UNCOMPLICATED (HCC): ICD-10-CM

## 2025-01-28 DIAGNOSIS — N30.90 CYSTITIS: ICD-10-CM

## 2025-01-28 DIAGNOSIS — S20.211D RIB CONTUSION, RIGHT, SUBSEQUENT ENCOUNTER: Primary | ICD-10-CM

## 2025-01-28 DIAGNOSIS — F41.9 ANXIETY: ICD-10-CM

## 2025-01-28 DIAGNOSIS — K43.9 VENTRAL HERNIA WITHOUT OBSTRUCTION OR GANGRENE: ICD-10-CM

## 2025-01-28 PROCEDURE — 99214 OFFICE O/P EST MOD 30 MIN: CPT | Performed by: FAMILY MEDICINE

## 2025-01-28 PROCEDURE — G2211 COMPLEX E/M VISIT ADD ON: HCPCS | Performed by: FAMILY MEDICINE

## 2025-01-28 RX ORDER — PHENAZOPYRIDINE HYDROCHLORIDE 200 MG/1
200 TABLET, FILM COATED ORAL
Qty: 10 TABLET | Refills: 0 | Status: SHIPPED | OUTPATIENT
Start: 2025-01-28 | End: 2025-02-03 | Stop reason: SDUPTHER

## 2025-01-28 RX ORDER — LORAZEPAM 2 MG/1
2 TABLET ORAL EVERY 8 HOURS PRN
Qty: 90 TABLET | Refills: 0 | Status: SHIPPED | OUTPATIENT
Start: 2025-01-28

## 2025-01-28 RX ORDER — SUCRALFATE 1 G/1
1 TABLET ORAL 4 TIMES DAILY
Qty: 120 TABLET | Refills: 5 | Status: SHIPPED | OUTPATIENT
Start: 2025-01-28

## 2025-01-28 NOTE — PROGRESS NOTES
Assessment/Plan:       Diagnoses and all orders for this visit:    Rib contusion, right, subsequent encounter  Comments:  Patient may continue with ice and Voltaren gel topically.  All hospital records reviewed at this time including all CAT scans and x-rays.    Ventral hernia without obstruction or gangrene  Comments:  Referral to general surgery for second opinion.  Orders:  -     Ambulatory Referral to General Surgery; Future    Opioid dependence, uncomplicated (HCC)  Comments:  Stable at this time.    Pulmonary emphysema, unspecified emphysema type (HCC)  Comments:  Stable at this time.    Cystitis  Comments:  Follow-up with urology appropriately.  Prodium given.  Orders:  -     phenazopyridine (PYRIDIUM) 200 mg tablet; Take 1 tablet (200 mg total) by mouth 3 (three) times a day with meals    Anxiety  Comments:  Continue lorazepam per routine.  Refills given.  Orders:  -     LORazepam (ATIVAN) 2 mg tablet; Take 1 tablet (2 mg total) by mouth every 8 (eight) hours as needed for anxiety            Subjective:        Patient ID: Nancy Jones is a 64 y.o. female.      Patient is here status post fall necessitating ER visit.  Patient was significant right rib pain.  Patient tripped and fell forward.  Patient was seen 6 days ago.  Patient did have CAT scan of the head and cervical spine as well as CT chest abdomen pelvis which were reviewed at this time.  X-rays of the chest also reviewed.  X-ray of the humerus reviewed.  Patient with ongoing some right rib pain but this is slowly improving.  Patient concerned about abdominal hernias.  Patient wishes another opinion.    Chest Pain   Associated symptoms include abdominal pain.         The following portions of the patient's history were reviewed and updated as appropriate: allergies, current medications, past family history, past medical history, past social history, past surgical history and problem list.      Review of Systems   Constitutional: Negative.    HENT:  "Negative.     Eyes: Negative.    Respiratory: Negative.     Cardiovascular:  Positive for chest pain.   Gastrointestinal:  Positive for abdominal pain.   Endocrine: Negative.    Genitourinary: Negative.    Musculoskeletal: Negative.    Skin: Negative.    Allergic/Immunologic: Negative.    Neurological: Negative.    Hematological: Negative.    Psychiatric/Behavioral: Negative.             Objective:        Falls Plan of Care: balance, strength, and gait training instructions were provided.             /64 (BP Location: Left arm, Patient Position: Sitting, Cuff Size: Large)   Pulse 63   Temp (!) 97.4 °F (36.3 °C) (Temporal)   Ht 5' 4\" (1.626 m)   Wt 95.3 kg (210 lb)   LMP  (LMP Unknown)   SpO2 93%   BMI 36.05 kg/m²          Physical Exam  Vitals and nursing note reviewed.   Constitutional:       General: She is not in acute distress.     Appearance: Normal appearance. She is not ill-appearing, toxic-appearing or diaphoretic.   HENT:      Head: Normocephalic and atraumatic.      Right Ear: Tympanic membrane, ear canal and external ear normal. There is no impacted cerumen.      Left Ear: Tympanic membrane, ear canal and external ear normal. There is no impacted cerumen.      Nose: Nose normal. No congestion or rhinorrhea.      Mouth/Throat:      Mouth: Mucous membranes are moist.      Pharynx: No oropharyngeal exudate or posterior oropharyngeal erythema.   Eyes:      General: No scleral icterus.        Right eye: No discharge.         Left eye: No discharge.   Cardiovascular:      Rate and Rhythm: Normal rate and regular rhythm.      Pulses: Normal pulses.      Heart sounds: Normal heart sounds. No murmur heard.     No friction rub. No gallop.   Pulmonary:      Effort: Pulmonary effort is normal. No respiratory distress.      Breath sounds: Normal breath sounds. No stridor. No wheezing, rhonchi or rales.   Chest:      Chest wall: No tenderness.   Abdominal:      Tenderness: There is abdominal tenderness.    "   Hernia: A hernia is present.   Musculoskeletal:         General: Tenderness present. No swelling, deformity or signs of injury.      Cervical back: Normal range of motion and neck supple. No rigidity. No muscular tenderness.      Right lower leg: No edema.      Left lower leg: No edema.      Comments: Right mid to lower ribs tender with palpation laterally   Lymphadenopathy:      Cervical: No cervical adenopathy.   Skin:     General: Skin is warm and dry.      Capillary Refill: Capillary refill takes less than 2 seconds.      Coloration: Skin is not jaundiced.      Findings: No bruising, erythema, lesion or rash.   Neurological:      Mental Status: She is alert and oriented to person, place, and time. Mental status is at baseline.      Gait: Gait normal.   Psychiatric:         Mood and Affect: Mood normal.         Behavior: Behavior normal.         Thought Content: Thought content normal.         Judgment: Judgment normal.

## 2025-01-28 NOTE — PROGRESS NOTES
UROLOGY PROGRESS NOTE   [facility]  [address] [place] [address] [place]  [address]  [place] [id number]  [phone]  Fax:[phone]  [email or web address]      NAME: Nancy Jones  AGE: 64 y.o. SEX: female  : 1960   MRN: [id number]    DATE: 2025  TIME: 10:46 AM    Assessment and Plan:    UTI symptoms-urinalysis as below, send culture and urine cytology because of the dysuria and difficulty voiding.  Also check kidney bladder ultrasound to check for incomplete bladder emptying.  She has a chronically large bladder.  Send her the results.    Recurrent UTI: I think she had actually done quite well with the prophylactic Keflex but now she is starting to itch.  We therefore should stop it.  Another thought is that she currently may be having a yeast UTI given the color of the urine and its appearance.  She also is having vaginal itching.  For that reason I will call in fluconazole 200 mg daily for 7 days.  See how she does with that.    Follow-up in 3 months to reevaluate.               Chief Complaint     Chief Complaint   Patient presents with    Follow-up     6 month follow-up to recurrent UTI's       History of Present Illness   Last seen by me 2024-recurrent UTI, symptomatic superimposed on interstitial cystitis.  Normal bladder which is large  but no tumors.  Last visit I gave her Keflex 250 mg daily for 6 months prophylaxis.  Cystoscopy 2024 showed no major abnormalities.    Last Wednesday had a fall on to her right side- had UTI after with pain, smell and burning- had urge to void q 5 minutes with need to bear down, had pink blood on the tissue. Was easing up, but started again at 2 AM. She often has diarrhea which she has thought may be starting UTIs, but this recent one not associated with Diarrhea. Has been on Keflex prophylaxis but it is starting to make her itch- therefore will stop it. No infection for 6 months.  I personally look at her urine, it is dense, orange in  "color and urinalysis shows large amount of blood, 3+ leukocyte esterase but no nitrates.  Specific gravity 1.2.  I personally reviewed her CT scans from April 2024 and the most recent 1 January 22, 2025.  Kidneys look normal no stones but she does have a large bladder as before.  This was noted on cystoscopy previously.  With her difficulty voiding, I am concerned about her not emptying her bladder very well.      The following portions of the patient's history were reviewed and updated as appropriate: allergies, current medications, past family history, past medical history, past social history, past surgical history and problem list.  Past Medical History:   Diagnosis Date    A-fib ([facility]) 03/2020    Allergic     Anxiety     Arthritis     Asthma     Back pain     and muscle pain    Bruises easily     Chronic pain disorder     Interstitial pain    Colon polyp     Dental bridge present     Depression     Eczema     GERD (gastroesophageal reflux disease)     Heart murmur     History of blood clots     per pt \"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\"    History of pneumonia     Hives     Hyperlipidemia     Hypertension     Infertility, female     Interstitial cystitis     Migraine     sees neurologist    Motion sickness     Obesity     Palpitations     PONV (postoperative nausea and vomiting)     Premature atrial contractions 11/09/2022    Psychiatric disorder     TIA (transient ischemic attack) 09/2022    per pt --\"had MRI and 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\"    Urinary incontinence     wears Pads    Venous insufficiency 08/01/2017    Wears glasses      Past Surgical History:   Procedure Laterality Date    COLONOSCOPY      DILATION AND CURETTAGE OF UTERUS      EGD      EXPLORATORY LAPAROTOMY      for infertility    EXPLORATORY LAPAROTOMY W/ BOWEL RESECTION N/A 7/19/2023    Procedure: LAPAROTOMY EXPLORATORY W/ BOWEL RESECTION;  Surgeon: Crista Recinos MD;  Location: [facility];  Service: General "    HAND SURGERY      Hand excision of tendon cyst    MD LAPAROSCOPIC APPENDECTOMY N/A 05/03/2022    Procedure: APPENDECTOMY LAPAROSCOPIC;  Surgeon: Vin Fields MD;  Location: [facility];  Service: General    MD LAPS ABD PRTM&OMENTUM DX W/WO SPEC BR/WA SPX N/A 7/19/2023    Procedure: LAPAROSCOPY DIAGNOSTIC, LYSIS OF ADHESIONS, LAPAROSCOPY TAKE TAKEDOWN OF LEFT COLON, EXPLORATORY LAPAROSCOPY, LYSIS OF ADHESIONS, RESECTION OF TRANSVERSE COLON SPLENIC FLEXURE AND DESCENDING COLON , TAKE DOWN OF SPLENIC FLEXURE, SIGMOIDOSCOPY, MOBILIZATION OF RIGHT COLON, PRIMARY ANASTOMOSIS EEA 29, TAP BLOCK;  Surgeon: Crista Recinos MD;  Location[facility];  Service: General    MD TCAT IV STENT CRV CRTD ART EMBOLIC PROTECJ Left 4/29/2024    Procedure: ANGIOPLASTY ARTERY CAROTID W/ STENT;  Surgeon: Roberto García DO;  [facility];  Service: Vascular    MD TEAEC W/PATCH GRF CAROTID VERTB SUBCLAV NECK INC Left 1/31/2023    Procedure: EXPLORATION OF LEFT CAROTID ARTERY; ABORTED ENDARTERECTOMY ARTERY CAROTID;  Surgeon: Roberto García DO;  Location: [facility];  Service: Vascular    SUPERIOR MESTENTERIC ARTERY STENT      WISDOM TOOTH EXTRACTION         Review of Systems   Review of Systems   Constitutional:  Negative for fever.   Respiratory:  Negative for shortness of breath.    Cardiovascular:  Negative for chest pain.   Gastrointestinal:  Positive for diarrhea.   Genitourinary:         As epr HPI       Active Problem List     Patient Active Problem List   Diagnosis    Angina pectoris (HCC)    Essential hypertension    Migraines    AMD (age-related macular degeneration), bilateral    Allergic reaction    Gastroesophageal reflux disease without esophagitis    Angio-edema    A-fib (HCC)    Lumbar radiculopathy    CAMILLE (obstructive sleep apnea)    Hypertensive heart disease with congestive heart failure (HCC)    Unspecified diastolic (congestive) heart failure (HCC)    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HCC)    COPD  "(chronic obstructive pulmonary disease) ([facility])    Urinary retention    Peripheral vascular disease (HCC)    Appendix disease    Carotid artery stenosis    Stenosis of left carotid artery    Asymptomatic stenosis of left carotid artery    Hepatic steatosis    Partial facial nerve palsy    Injury of left facial nerve    Paresthesia    Colitis    Chronic migraine w/o aura w/o status migrainosus, not intractable    IIH (idiopathic intracranial hypertension)    Hematochezia    Left carotid stenosis    Colonic ischemia ([facility])    Acute postoperative pain    S/P small bowel resection    Anxiety    Omental infarction ([facility])    Hypokalemia    Night sweats    Dysuria    Incisional hernia, without obstruction or gangrene    Stage 3 chronic kidney disease, unspecified whether stage 3a or 3b CKD ([facility])    Ventral hernia without obstruction or gangrene    Atherosclerosis of native arteries of extremities with rest pain, bilateral legs ([facility])    Acute on chronic diastolic (congestive) heart failure ([facility])    Left facial numbness    Other chest pain    Acute pain of left knee    Acute lateral meniscus tear of left knee    Opioid dependence, uncomplicated ([facility])    Obesity, morbid ([facility])    Complex tear of lateral meniscus of left knee as current injury, initial encounter    Folliculitis    Shingles    Diabetes due to undrl condition w oth diabetic neuro comp ([facility])    Financial difficulties    Rib contusion, right, subsequent encounter       Objective   /82 (Patient Position: Sitting, Cuff Size: Extra-Large)   Pulse 68   Temp 97.8 °F (36.6 °C) (Temporal)   Ht 5' 4\" (1.626 m)   Wt 97.2 kg (214 lb 3.2 oz)   LMP  (LMP Unknown)   SpO2 96%   BMI 36.77 kg/m²     Physical Exam  Vitals reviewed.   Constitutional:       Appearance: Normal appearance.   HENT:      Head: Normocephalic and atraumatic.   Eyes:      Extraocular Movements: Extraocular movements intact.   Pulmonary:      Effort: Pulmonary effort is normal.   Musculoskeletal:    "      General: Normal range of motion.      Cervical back: Normal range of motion.   Skin:     Coloration: Skin is not jaundiced or pale.   Neurological:      General: No focal deficit present.      Mental Status: She is alert and oriented to person, place, and time. Mental status is at baseline.   Psychiatric:         Mood and Affect: Mood normal.         Behavior: Behavior normal.         Thought Content: Thought content normal.         Judgment: Judgment normal.             Current Medications     Current Outpatient Medications:     acetaminophen (TYLENOL) 325 mg tablet, Take 2 tablets (650 mg total) by mouth every 6 (six) hours, Disp: , Rfl: 0    apixaban (Eliquis) 5 mg, Take 1 tablet (5 mg total) by mouth 2 (two) times a day, Disp: 180 tablet, Rfl: 3    cephalexin (KEFLEX) 250 mg capsule, Take 1 capsule (250 mg total) by mouth every morning, Disp: 90 capsule, Rfl: 0    chlordiazepoxide-clidinium (LIBRAX) 5-2.5 mg per capsule, TAKE 1 CAPSULE BY MOUTH TWO TIMES DAILY AS NEEDED FOR INDIGESTION, Disp: 60 capsule, Rfl: 2    chlorhexidine (PERIDEX) 0.12 % solution, Apply 15 mL to the mouth or throat 2 (two) times a day, Disp: 120 mL, Rfl: 0    dexamethasone (DECADRON) 2 mg tablet, One tab with breakfast (early in day to minimize impact on sleep, with food for stomach protection) for 1-5 days for unrelenting migraine, Disp: 5 tablet, Rfl: 0    Diclofenac Sodium (VOLTAREN) 1 %, Apply 2 g topically 4 (four) times a day, Disp: 100 g, Rfl: 1    diltiazem (CARDIZEM CD) 180 mg 24 hr capsule, Take 1 capsule (180 mg total) by mouth daily, Disp: 90 capsule, Rfl: 3    Docusate Sodium (COLACE PO), Take by mouth daily at bedtime, Disp: , Rfl:     ezetimibe (ZETIA) 10 mg tablet, Take 1 tablet (10 mg total) by mouth daily, Disp: 30 tablet, Rfl: 5    famciclovir (FAMVIR) 500 mg tablet, Take 1 tablet (500 mg total) by mouth 3 (three) times a day for 7 days, Disp: 21 tablet, Rfl: 0    fluticasone-vilanterol (BREO ELLIPTA) 200-25  MCG/INH inhaler, Inhale 1 puff daily Rinse mouth after use., Disp: 3 Inhaler, Rfl: 3    furosemide (LASIX) 40 mg tablet, Take 1 tablet (40 mg total) by mouth daily, Disp: 90 tablet, Rfl: 1    LORazepam (ATIVAN) 1 mg tablet, Take 1 tablet (1 mg total) by mouth 3 (three) times a day, Disp: 90 tablet, Rfl: 0    LORazepam (ATIVAN) 2 mg tablet, Take 1 tablet (2 mg total) by mouth every 8 (eight) hours as needed for anxiety, Disp: 90 tablet, Rfl: 0    magnesium Oxide (MAG-OX) 400 mg TABS, Take 1 tablet (400 mg total) by mouth daily at bedtime, Disp: 90 tablet, Rfl: 3    methocarbamol (ROBAXIN) 500 mg tablet, TAKE TWO TABLETS BY MOUTH THREE TIMES A DAY, Disp: 180 tablet, Rfl: 2    metoprolol succinate (TOPROL-XL) 100 mg 24 hr tablet, Take 1 tablet (100 mg total) by mouth 2 (two) times a day, Disp: 180 tablet, Rfl: 1    omeprazole (PriLOSEC) 40 MG capsule, Take 1 capsule (40 mg total) by mouth daily, Disp: 90 capsule, Rfl: 1    ondansetron (ZOFRAN-ODT) 4 mg disintegrating tablet, Take 1 tablet (4 mg total) by mouth every 8 (eight) hours as needed for nausea, Disp: 21 tablet, Rfl: 5    phenazopyridine (PYRIDIUM) 200 mg tablet, Take 1 tablet (200 mg total) by mouth 3 (three) times a day with meals, Disp: 10 tablet, Rfl: 0    phenazopyridine (PYRIDIUM) 200 mg tablet, Take 1 tablet (200 mg total) by mouth 3 (three) times a day with meals, Disp: 10 tablet, Rfl: 0    polyethylene glycol (MIRALAX) 17 g packet, Take 17 g by mouth daily as needed (constipation), Disp: , Rfl:     promethazine (PHENERGAN) 25 mg tablet, Take 1 tablet (25 mg total) by mouth every 6 (six) hours as needed for nausea or vomiting, Disp: 60 tablet, Rfl: 2    spironolactone (ALDACTONE) 25 mg tablet, Take 1 tablet (25 mg total) by mouth 2 (two) times a day, Disp: 180 tablet, Rfl: 1    sucralfate (CARAFATE) 1 g tablet, TAKE 1 TABLET BY MOUTH FOUR TIMES DAILY, Disp: 120 tablet, Rfl: 5    traMADol (ULTRAM) 50 mg tablet, Take 2 tablets (100 mg total) by mouth  every 8 (eight) hours as needed for moderate pain, Disp: 180 tablet, Rfl: 0    triamcinolone (KENALOG) 0.1 % ointment, APPLY TOPICALLY TO AFFECTED AREA(S) TWO TIMES DAILY, Disp: 15 g, Rfl: 2    butalbital-acetaminophen-caffeine (FIORICET,ESGIC) -40 mg per tablet, Take 1 tablet by mouth every 4 (four) hours as needed for headaches (Patient not taking: Reported on 1/20/2025), Disp: 20 tablet, Rfl: 0    fexofenadine (ALLEGRA) 180 MG tablet, Take 180 mg by mouth daily (Patient not taking: Reported on 12/31/2024), Disp: , Rfl:     topiramate (TOPAMAX) 25 mg tablet, 25 mg p.o. in a.m. and p.m. for 1 week, then increase as tolerated to 25 mg QAM and 50 mg QHS for 1 week and finish at 50 mg in a.m. and p.m. (Patient not taking: Reported on 1/20/2025), Disp: 120 tablet, Rfl: 4    Current Facility-Administered Medications:     cyanocobalamin injection 1,000 mcg, 1,000 mcg, Intramuscular, Q30 Days, Coy Ayoub DO, 1,000 mcg at 11/16/23 1151    I have spent a total time of 25 minutes in caring for this patient on the day of the visit/encounter including Diagnostic results, Risks and benefits of tx options, Instructions for management, Impressions, Counseling / Coordination of care, Documenting in the medical record, Reviewing / ordering tests, medicine, procedures  , and Obtaining or reviewing history  .     Alexis Hobson MD

## 2025-01-29 ENCOUNTER — TELEPHONE (OUTPATIENT)
Age: 65
End: 2025-01-29

## 2025-01-29 ENCOUNTER — OFFICE VISIT (OUTPATIENT)
Dept: UROLOGY | Facility: CLINIC | Age: 65
End: 2025-01-29
Payer: COMMERCIAL

## 2025-01-29 VITALS
SYSTOLIC BLOOD PRESSURE: 136 MMHG | HEART RATE: 68 BPM | OXYGEN SATURATION: 96 % | BODY MASS INDEX: 36.57 KG/M2 | WEIGHT: 214.2 LBS | HEIGHT: 64 IN | TEMPERATURE: 97.8 F | DIASTOLIC BLOOD PRESSURE: 82 MMHG

## 2025-01-29 DIAGNOSIS — R33.9 INCOMPLETE BLADDER EMPTYING: ICD-10-CM

## 2025-01-29 DIAGNOSIS — B37.31 YEAST VAGINITIS: ICD-10-CM

## 2025-01-29 DIAGNOSIS — N39.0 RECURRENT UTI: Primary | ICD-10-CM

## 2025-01-29 PROCEDURE — 87086 URINE CULTURE/COLONY COUNT: CPT | Performed by: UROLOGY

## 2025-01-29 PROCEDURE — 99214 OFFICE O/P EST MOD 30 MIN: CPT | Performed by: UROLOGY

## 2025-01-29 RX ORDER — FLUCONAZOLE 200 MG/1
200 TABLET ORAL DAILY
Qty: 7 TABLET | Refills: 0 | Status: SHIPPED | OUTPATIENT
Start: 2025-01-29 | End: 2025-02-05

## 2025-01-29 NOTE — TELEPHONE ENCOUNTER
Patient calling and states that Rudy told her they called us because the Diflucan interacts with Amiodarone and Fioricet, however patient states she no longer takes either of these medications anymore    Call placed to [facility], spoke with pharmacist, informed per patient she is no longer on those medications. Pharmacist states it also interacts with Eliquis. Pharmacist will call patient to clarify if she is still on that and will call us back if necessary

## 2025-01-30 ENCOUNTER — HOSPITAL ENCOUNTER (OUTPATIENT)
Dept: ULTRASOUND IMAGING | Facility: HOSPITAL | Age: 65
Discharge: HOME | End: 2025-01-30
Attending: UROLOGY
Payer: COMMERCIAL

## 2025-01-30 DIAGNOSIS — R33.9 INCOMPLETE BLADDER EMPTYING: ICD-10-CM

## 2025-01-30 DIAGNOSIS — N39.0 RECURRENT UTI: ICD-10-CM

## 2025-01-30 LAB — BACTERIA UR CULT: NORMAL

## 2025-01-30 PROCEDURE — 76770 US EXAM ABDO BACK WALL COMP: CPT

## 2025-01-31 ENCOUNTER — APPOINTMENT (OUTPATIENT)
Facility: REHABILITATION | Age: 65
End: 2025-01-31
Payer: COMMERCIAL

## 2025-01-31 ENCOUNTER — OFFICE VISIT (OUTPATIENT)
Age: 65
End: 2025-01-31
Payer: COMMERCIAL

## 2025-01-31 VITALS — BODY MASS INDEX: 35.87 KG/M2 | WEIGHT: 209 LBS | TEMPERATURE: 97.6 F

## 2025-01-31 DIAGNOSIS — L72.0 EPIDERMAL CYST: Primary | ICD-10-CM

## 2025-01-31 DIAGNOSIS — L30.9 DERMATITIS: ICD-10-CM

## 2025-01-31 PROCEDURE — 99214 OFFICE O/P EST MOD 30 MIN: CPT | Performed by: REGISTERED NURSE

## 2025-01-31 NOTE — PROGRESS NOTES
"[facility] Dermatology Clinic Note     Patient Name: Nancy Jones  Encounter Date: 1/31/2025     Have you been cared for by a [facility] Dermatologist in the last 3 years and, if so, which description applies to you?    Yes.  I have been here within the last 3 years, and my medical history has NOT changed since that time.  I am FEMALE/of child-bearing potential.    REVIEW OF SYSTEMS:  Have you recently had or currently have any of the following? No changes in my recent health.   PAST MEDICAL HISTORY:  Have you personally ever had or currently have any of the following?  If \"YES,\" then please provide more detail. No changes in my medical history.   HISTORY OF IMMUNOSUPPRESSION: Do you have a history of any of the following:  Systemic Immunosuppression such as Diabetes, Biologic or Immunotherapy, Chemotherapy, Organ Transplantation, Bone Marrow Transplantation or Prednisone?  No     Answering \"YES\" requires the addition of the dotphrase \"IMMUNOSUPPRESSED\" as the first diagnosis of the patient's visit.   FAMILY HISTORY:  Any \"first degree relatives\" (parent, brother, sister, or child) with the following?    No changes in my family's known health.   PATIENT EXPERIENCE:    Do you want the Dermatologist to perform a COMPLETE skin exam today including a clinical examination under the \"bra and underwear\" areas?  NO  If necessary, do we have your permission to call and leave a detailed message on your Preferred Phone number that includes your specific medical information?  Yes      Allergies   Allergen Reactions    Ace Inhibitors Angioedema and Anaphylaxis     Anaphylaxis    Benicar [Olmesartan] Angioedema     See Allergy note from 9/11/2008.  Swollen ankles/legs    Hydralazine Other (See Comments)     Lip swelling  Flank pain    Other Anaphylaxis and Other (See Comments)     Preservatives- itching throat closes, hi  E Z Cat scan contrast - hives throat closes itching,  Preservatives- itching throat closes, hi  Artificial " "sweeteners    Valsartan Angioedema     Lips, face swollen    Ampicillin Hives     Depends on brand some preservatives can react. Has recently tolerated oral amoxicillin.    Sulfa Antibiotics Hives     stuffiness,itching,hives,throat closing--per pt \"sometimes able to take depending on the brand and the filler or possibly preservatives in it\"    Motrin [Ibuprofen] Other (See Comments)     Per pt \" told not to take due to A Fib\"    Wound Dressing Adhesive Other (See Comments)     Per pt Adhesive on EKG leads caused redness      Current Outpatient Medications:     acetaminophen (TYLENOL) 325 mg tablet, Take 2 tablets (650 mg total) by mouth every 6 (six) hours, Disp: , Rfl: 0    apixaban (Eliquis) 5 mg, Take 1 tablet (5 mg total) by mouth 2 (two) times a day, Disp: 180 tablet, Rfl: 3    butalbital-acetaminophen-caffeine (FIORICET,ESGIC) -40 mg per tablet, Take 1 tablet by mouth every 4 (four) hours as needed for headaches, Disp: 20 tablet, Rfl: 0    chlordiazepoxide-clidinium (LIBRAX) 5-2.5 mg per capsule, TAKE 1 CAPSULE BY MOUTH TWO TIMES DAILY AS NEEDED FOR INDIGESTION, Disp: 60 capsule, Rfl: 2    chlorhexidine (PERIDEX) 0.12 % solution, Apply 15 mL to the mouth or throat 2 (two) times a day, Disp: 120 mL, Rfl: 0    dexamethasone (DECADRON) 2 mg tablet, One tab with breakfast (early in day to minimize impact on sleep, with food for stomach protection) for 1-5 days for unrelenting migraine, Disp: 5 tablet, Rfl: 0    Diclofenac Sodium (VOLTAREN) 1 %, Apply 2 g topically 4 (four) times a day, Disp: 100 g, Rfl: 1    diltiazem (CARDIZEM CD) 180 mg 24 hr capsule, Take 1 capsule (180 mg total) by mouth daily, Disp: 90 capsule, Rfl: 3    Docusate Sodium (COLACE PO), Take by mouth daily at bedtime, Disp: , Rfl:     ezetimibe (ZETIA) 10 mg tablet, Take 1 tablet (10 mg total) by mouth daily, Disp: 30 tablet, Rfl: 5    fexofenadine (ALLEGRA) 180 MG tablet, Take 180 mg by mouth daily, Disp: , Rfl:     fluconazole (DIFLUCAN) " 200 mg tablet, Take 1 tablet (200 mg total) by mouth daily for 7 days, Disp: 7 tablet, Rfl: 0    fluticasone-vilanterol (BREO ELLIPTA) 200-25 MCG/INH inhaler, Inhale 1 puff daily Rinse mouth after use., Disp: 3 Inhaler, Rfl: 3    furosemide (LASIX) 40 mg tablet, Take 1 tablet (40 mg total) by mouth daily, Disp: 90 tablet, Rfl: 1    LORazepam (ATIVAN) 1 mg tablet, Take 1 tablet (1 mg total) by mouth 3 (three) times a day, Disp: 90 tablet, Rfl: 0    LORazepam (ATIVAN) 2 mg tablet, Take 1 tablet (2 mg total) by mouth every 8 (eight) hours as needed for anxiety, Disp: 90 tablet, Rfl: 0    magnesium Oxide (MAG-OX) 400 mg TABS, Take 1 tablet (400 mg total) by mouth daily at bedtime, Disp: 90 tablet, Rfl: 3    methocarbamol (ROBAXIN) 500 mg tablet, TAKE TWO TABLETS BY MOUTH THREE TIMES A DAY, Disp: 180 tablet, Rfl: 2    metoprolol succinate (TOPROL-XL) 100 mg 24 hr tablet, Take 1 tablet (100 mg total) by mouth 2 (two) times a day, Disp: 180 tablet, Rfl: 1    omeprazole (PriLOSEC) 40 MG capsule, Take 1 capsule (40 mg total) by mouth daily, Disp: 90 capsule, Rfl: 1    ondansetron (ZOFRAN-ODT) 4 mg disintegrating tablet, Take 1 tablet (4 mg total) by mouth every 8 (eight) hours as needed for nausea, Disp: 21 tablet, Rfl: 5    phenazopyridine (PYRIDIUM) 200 mg tablet, Take 1 tablet (200 mg total) by mouth 3 (three) times a day with meals, Disp: 10 tablet, Rfl: 0    phenazopyridine (PYRIDIUM) 200 mg tablet, Take 1 tablet (200 mg total) by mouth 3 (three) times a day with meals, Disp: 10 tablet, Rfl: 0    polyethylene glycol (MIRALAX) 17 g packet, Take 17 g by mouth daily as needed (constipation), Disp: , Rfl:     promethazine (PHENERGAN) 25 mg tablet, Take 1 tablet (25 mg total) by mouth every 6 (six) hours as needed for nausea or vomiting, Disp: 60 tablet, Rfl: 2    spironolactone (ALDACTONE) 25 mg tablet, Take 1 tablet (25 mg total) by mouth 2 (two) times a day, Disp: 180 tablet, Rfl: 1    sucralfate (CARAFATE) 1 g tablet,  TAKE 1 TABLET BY MOUTH FOUR TIMES DAILY, Disp: 120 tablet, Rfl: 5    topiramate (TOPAMAX) 25 mg tablet, 25 mg p.o. in a.m. and p.m. for 1 week, then increase as tolerated to 25 mg QAM and 50 mg QHS for 1 week and finish at 50 mg in a.m. and p.m., Disp: 120 tablet, Rfl: 4    traMADol (ULTRAM) 50 mg tablet, Take 2 tablets (100 mg total) by mouth every 8 (eight) hours as needed for moderate pain, Disp: 180 tablet, Rfl: 0    triamcinolone (KENALOG) 0.1 % ointment, APPLY TOPICALLY TO AFFECTED AREA(S) TWO TIMES DAILY, Disp: 15 g, Rfl: 2    famciclovir (FAMVIR) 500 mg tablet, Take 1 tablet (500 mg total) by mouth 3 (three) times a day for 7 days, Disp: 21 tablet, Rfl: 0    Current Facility-Administered Medications:     cyanocobalamin injection 1,000 mcg, 1,000 mcg, Intramuscular, Q30 Days, Coy Ayoub DO, 1,000 mcg at 11/16/23 1151          Whom besides the patient is providing clinical information about today's encounter?   NO ADDITIONAL HISTORIAN (patient alone provided history)    Physical Exam and Assessment/Plan by Diagnosis:    PIH after SHINGLES     Physical Exam:  (Anatomic Location); (Size and Morphological Description); (Differential Diagnosis):  Brown patches on the left upper thigh and groin    Additional History of Present Condition:  Patient is returning to clinic for follow up on shingles. She was prescribed a a course of Valtrex 1000mg TID for seven days which helped lesions to resolve. She no longer has active lesions and doesn't have any associated symptoms.      Assessment and Plan:  Based on a thorough discussion of this condition and the management approach to it (including a comprehensive discussion of the known risks, side effects and potential benefits of treatment), the patient (family) agrees to implement the following specific plan:  Discussed that hyperpigmentation will eventually fade away.  Recommend taking the Shingrix vaccine.        DERMATITIS    Physical Exam:  Anatomic Location  Affected:  bilateral retroauricular skin   Morphological Description:  erythematous scaly plaques   Body Surface Area Today:  < 1%  Overall Severity: mild  Pertinent Positives: intermittent pruritus  Pertinent Negatives:    Additional History of Present Condition:  Patient reports feeling tiny rough patches behind her bilateral ears that intermittently becomes dry and itches.     Assessment and Plan:  Based on a thorough discussion of this condition and the management approach to it (including a comprehensive discussion of the known risks, side effects and potential benefits of treatment), the patient (family) agrees to implement the following specific plan:  Patient instructed to use her prescribed triamcinolone 0.1% ointment on the spots twice a day for 2 weeks and then taper to twice a day on Monday, Wednesday, and Friday for maintenance.     Assessment and Plan:   Atopic Dermatitis is a chronic, itchy skin condition that is very common in children but may occur at any age. It is also known as “eczema” or “atopic eczema.” It is the most common form of dermatitis.    Atopic dermatitis usually occurs in people who have an “atopic tendency.”  This means they may develop any or all of these closely linked conditions:  Atopic dermatitis, asthma, hay fever (allergic rhinitis), eosinophilic esophagitis, and gastroenteritis.  Often these conditions run within families with a parent, child or sibling also affected. A family history of asthma, eczema or hay fever is particularly useful in diagnosing atopic dermatitis in infants.    Atopic dermatitis arises because of a complex interaction of genetic and environmental factors. These include defects in skin barrier function making the skin more susceptible to irritation by soap and other contact irritants, the weather, temperature and non-specific triggers.  There is also an element of immune system dysregulation that is often present.  By definition, it is chronic and has a  "\"waxing-waning\" nature; flares should be expected but with good education and treatment strategies can be minimized.    Some specific tips we discussed:  Dry skin care.  Using only mild cleansers (hypoallergenic and without fragrances) and fragrance free detergent (not “unscented” products which contain a masking agent); we discussed avoiding irritants/fragranced products.  The importance of regular application of moisturizers daily (at least 3 times a day)  The known and theoretical side effects of steroids at length, including but not limited to atrophy of skin and increased pressure in eye (glaucoma) and clouding of the eye's lens (cataracts) if used in or around the eye for extended durations.  The specific over-the-counter interventions and medications.  Side effects, risks and benefits of topical and oral medications discussed.  After lengthy discussion of etiology and treatment options, we decided to implement the following personalized treatment plan:      EDUCATION AS INTERVENTION!    WHAT IS ATOPIC DERMATITIS?  Atopic dermatitis (also called “eczema”) is a condition of the skin where the skin is dry, red, and itchy.  The main function of the skin is to provide a barrier from the environment and is also the first defense of the immune system.    In atopic dermatitis the skin barrier is decreased or disrupted, and the skin is easily irritated.  As a result, moisture escapes the skin more easily, and environmental allergens and microbes can enter the skin more easily.  Consequently, the skin's immune system is altered.  If there are increased allergic type cells in the skin, the skin may become red and “hyper-excitable.” This leads to itching and a subsequent rash.    WHY DO PEOPLE GET ATOPIC DERMATITIS?  There is no single answer because many factors are involved.  It is likely a combination of genetic makeup and environmental triggers and/or exposures. Excessive drying or sweating of the skin, Irritating " soaps, dust mites, and pet dander are some of the more common triggers.  There is no blood test that can be done to confirm this diagnosis. The history and appearance of the skin is usually sufficient for a diagnosis. However, in some cases if the rash does not fit with the history or respond appropriately to treatment, a skin biopsy may be helpful.  Many children do outgrow atopic dermatitis or get better; however, many continue to have sensitive skin into adulthood.  Asthma and hay fever are often seen in many patients with atopic dermatitis; however, asthma flares do not necessarily occur at the same time as skin flares.     PREVENTING FLARES OF ATOPIC DERMATITIS  The first step is to maintain the skin's barrier function.  Keep the skin well moisturized.  Avoid irritants and triggers.  Use prescribed medicine when there are red or rough areas to help the skin to return to normal as quickly as possible.  Try to limit scratching.     If you keep the skin well moisturized, and avoid coming in contact with things you know irritate your child's skin, there will be less flares.  However, some flares of atopic dermatitis are beyond your control.  You should work with your health care provider to come up with a plan that minimizes flares while minimizing long term use of medications that suppress the immune system.        WHAT ARE SOME OF THE TRIGGERS?  Triggers are different for different people. The most common triggers are:  Heat and sweat for some individuals, cold weather for others.  House dust mites, pet fur.  Wool; synthetic fabrics like nylon; dyed fabrics.  Tobacco smoke   Fragrances in: shampoos, soaps, lotions, laundry detergents, fabric softeners.  Saliva or prolonged exposure to water.    WHAT ABOUT FOOD ALLERGIES?  This is a very controversial topic, as many believe that food allergies are responsible for skin flares. In some cases, specific foods may cause worsening of atopic dermatitis; however this  occurs in a minority of cases and usually happens within a few hours of ingestion. While food allergy is more common in children with eczema, foods are specific triggers for flares in only a small percentage of children.  If you notice that the skin flares after certain foods you can see if eliminating one food at time makes a difference, as long as your child can still enjoy a well-balanced diet.   There are blood (RAST) and skin (PRICK) tests that can check for allergies, but they are often positive in children who are not truly allergic. Therefore it is important that you work with your allergist and dermatologist to determine which foods are relevant and causing true symptoms.  Extreme food elimination diets without the guidance of your doctor, which have become more popular in recent years, may even result in worsening of the skin rash due to malnutrition and avoidance of essential nutrients.    TREATMENT  Treatments are aimed at minimizing exposure to irritating factors and decreasing  the skin inflammation which results in an itchy rash.There are many different treatment options, which depend on your child's rash, its location, and severity.  Topical treatments include corticosteroids and steroid-like creams such as Protopic, Elidel, and Eucrisa, which are believed to not thin the skin.  Please read the discussions below regarding risks and benefits of all of these creams.    Occasionally bacterial or viral infections can occur which flare the skin and require oral and/or topical antibiotics or antivirals. In some cases bleach baths 2-3 times weekly can be helpful to prevent recurrent infection.    For severe disease, strong oral medications such as corticosteroids, methotrexate or azathioprine (Imuran) may be needed. These medications require close monitoring and follow-up. You should discuss the risks/ benefits/alternatives of these medications with your health care provider to come up with the best  treatment plan for your child.    1) Use moisturizer all over the entire body at least THREE TIMES a day.  This keeps the skin moisturized to restore the barrier function.  Find a cream or ointment that your child likes - this is the most important.  The medicines do not work in the bottle.  The thicker the moisturizer, generally the better barrier it provides.  Ointments often moisturize better than creams; and creams work better than lotions.  Lotions are more useful during the summer when thick greasy ointments are uncomfortable.  If you put moisturizer on the skin after bathing, while the skin is damp, it is twice as effective.  The moisturizer provides a seal holding the water in the skin.  You may bathe your child in warm - not hot - water, for short periods of time (no more than 5-10 minutes at a time) once a day if they like.  Lightly pat your child dry with a towel and, while the skin is still damp, (within 3 minutes) apply a moisturizer from head to toe.  If your child is using a medicated cream, apply it and allow it to absorb completely BEFORE you apply the moisturizer.    2) Apply the prescription medication TWICE A DAY to only the red, rough areas on the skin OR AS DIRECTED BY YOUR HEALTH CARE PROVIDER  Put the medication on your fingers and gently rub it into the areas.  Usually the medicine will help an area within a few days time.  Try to put the medicine on for two days after you have noticed that the redness is no longer present; this will help the redness from returning.  The severity of the rash and the strength and usage of the medication will determine how quickly you see improvement.    It is important that you do not overuse steroid creams, and if you notice a thin, shiny appearance to the skin or broken blood vessels, you should stop using the cream and consult your health care provider regarding possible overuse/overthinning of the skin.  The face, armpits and groin have particularly thin  "and sensitive skin and are therefore most at risk for bad results if steroids are over-used in these sites.      3) Avoid triggers.    Some children have specific things that trigger itching and rashes, while others may have none that can be identified.  It may require a little bit of trial and error to see what applies to your child.  Also, triggers can change over time for your child. The most common triggers are listed above; start with these.  Avoid the use of fabric softeners in the washing machine or dryer sheets (unless they are fragrance-free).  Try to use laundry detergents, soaps and shampoos that are fragrance-free. You may find it helpful to double-rinse your clothes.  Some children are sensitive to house dust mites and they may benefit from a plastic mattress wrap.  While food allergy is more common in children with eczema, foods are specific triggers for flares in only a small percentage of children.  If you notice that the skin flares after certain foods you can see if eliminating one food at time makes a difference, as long as your child can still enjoy a well-balanced diet.     4) Consider using a medication like an anti-histamine by mouth to help control the itching.    Scratching only makes the skin more reactive and the barrier function even more disrupted.  It can cause both children and their parents to lose sleep!  There are different types of anti-itch medications.  Some cause more drowsiness than others.  Both types are acceptable depending on your child and your preference.  Start with Benadryl and if that does not work, ask for a prescription “antihistamine.”    5) About the prescription creams:  Corticosteroid creams and ointments (generally things with \"-one\" or \"salma\" on the end of their names):  The strength of the cream or ointment depends on the name of the active ingredient.  The numbers at the end do not indicate the relative strength.  Thus triamcinolone 0.1% ointment, considered a " mid-strength corticosteroid, is much stronger than hydrocortisone 1% even though the number following the name is much lower.  Topical corticosteroids are very effective in treating atopic dermatitis.  When used in the manner prescribed (to rashy areas of skin and for no more than a few weeks at a time to any one area) they are very safe.  These are corticosteroids and are anti-inflammatory, not the “anabolic steroids” like those used illegally by some athletes.     Topical non-steroid creams and ointments (immunomodulators):  These creams and ointments are also called topical calcineurin inhibitors (TCIs).  These include Protopic ointment and Elidel cream. Crisaborole 2% (Eucrisa) is a prescription ointment that targets an enzyme called PDE4 (phosphodiesterase 4).  It is used on the skin topically to treat mild-to-moderate eczema in adults and children 2 years of age and older.  In total, these nonsteroidal prescriptions are used to help decrease itching and redness in the skin.  They are not as strong as most steroid creams; however, it is believed that they do not thin the skin when overused.  They are generally used as second-line medications, though they may be used alone or in conjunction with topical steroids.  In sensitive areas such as the face, underarms or groin, they are often recommended.  They can sting inflamed skin, but are generally well tolerated once the skin is healing.    The FDA placed a “black-box” warning on both Elidel and Protopic in 2006 based on animal studies using the medications.  Some animals developed skin cancer and lymphoma.  Subsequently, the FDA released a statement that there is no causal relationship between the two medications and cancer.  Because of this concern, there are ongoing studies to evaluate this relationship in humans.  So far, there are studies that support the safety of these medications.  One showed that the rates of cancer in patient using these medications  topically were less than the rates of the general population and another showed that in patient's using the medication over a large area of the body, the levels of the medication in the blood was undetectable.    As for Eucrisa, this product is only approved for the topical treatment of mild-to-moderate eczema in patients 2 years of age and older; use of the medication in kids younger than 2 is considered “off label” and has not been formally studied.  Burning and stinging are the most commonly reported side effects of this medication.  Rarely, this product has been known to cause hives and hypersensitivity reactions; discontinue its use if you develop severe itching, swelling, or redness in the area of application.     EPIDERMAL INCLUSION CYST    Physical Exam:  Anatomic Location Affected:  Left Jaw  Morphological Description:  small subcutaneous papule   Pertinent Positives:  Pertinent Negatives:    Additional History of Present Condition:  Patient reported feeling lump on left jaw that's asymptomatic.    Assessment and Plan:  Based on a thorough discussion of this condition and the management approach to it (including a comprehensive discussion of the known risks, side effects and potential benefits of treatment), the patient (family) agrees to implement the following specific plan:  Reassured benign.    What are epidermal inclusion cysts?  Epidermal inclusion cysts are the most common, benign cutaneous cysts. There are many different names for epidermal inclusion cysts, including epidermoid cyst, epidermal cyst, infundibular cyst, inclusion cyst, and keratin cyst. These cysts can occur anywhere on the body and typically present as nodules directly underneath the skin. There is often a visible pore or opening in the center. The cysts are freely moveable and can range from a few millimeters to several centimeters in diameter. The center of epidermoid cysts almost always contains keratin, which has a cheesy  appearance, and not sebum. They also do not originate from sebaceous glands. Therefore, epidermal inclusion cysts are not the same as sebaceous cysts.    Cysts may remain stable or progressively enlarge over time. There are no reliable predictive factors to tell if an epidermal inclusion cyst will enlarge, become inflamed, or remain quiescent. Infected cysts tend to become larger, turn red, and are more noticeable to the patient. There may be accompanying pain and discomfort.     What causes epidermal inclusion cysts?  Epidermal inclusion cysts often appear out of the blue and are not contagious. They are due to a proliferation of epidermal cells within the dermis and are more common in men than women. They occur more frequently in patients in their 20s to 40s. Epidermal inclusion cysts by themselves are usually not inherited, but they can be hereditary in rare syndromes such as Max syndrome, nodular elastosis with cysts and comedones (Favre-Racouchot syndrome), and basal cell nevus syndrome (Gorlin syndrome). Elderly patients with chronic sun-damaged skin areas have a higher likelihood of developing epidermoid cysts. They often occur in areas where hair follicles have been inflamed or repeatedly irritated are more frequent in patients with acne vulgaris. In the  period, they are called milia.     Patients on BRAF inhibitors such as imiquimod and cyclosporine have a higher incidence of epidermoid cysts of the face.    How do we diagnose an epidermal inclusion cyst?  Epidermoid inclusion cysts are often diagnosed by history and physical exam. There is usually no need for biopsy prior to removal.  Radiographic and laboratory exams, such as ultrasound studies, are unnecessary and not typically ordered unless the practitioner suspects a genetic condition.    What is the treatment for an epidermal inclusion cyst?  Inflamed, uninfected epidermal inclusion cysts rarely resolve spontaneously without therapy or  surgical intervention. Treatment is not emergent unless desired by the patient.     Definitive treatment is via surgical excision with walls intact. This method will prevent recurrence. This is best done when the cyst is not inflamed, to decrease the probability of rupture during surgery.   A local anesthetic will be injected around the cyst  A small incision is made in the skin overlying the cyst, and contents are expressed  The incision is repaired with sutures    Another option is to use a 4mm punch biopsy with cyst extraction through the defect.    Incision and drainage is often needed if the cyst is infected or inflamed. If there is surrounding cellulitis, oral antibiotic therapy may be necessary. The common agents used target methicillin sensitive Staphylococcal aureus and methicillin resistant S aureus in areas of high prevalence.        Scribe Attestation      I,:  Jason Smith MA am acting as a scribe while in the presence of the attending physician.:       I,:  Santy Saha MD personally performed the services described in this documentation    as scribed in my presence.:

## 2025-01-31 NOTE — TELEPHONE ENCOUNTER
Pt calling requesting letter for insurance purposes. She states she needs it a soon as possible. Letter was due today so she is hoping it can be done today.    Please follow up with patient.

## 2025-01-31 NOTE — TELEPHONE ENCOUNTER
Phone call to patient.  Will provide Dr. Lynn last office note from 1/20/25  Patient will try to  today.    Placed with the  clerical staff.

## 2025-02-03 ENCOUNTER — TELEPHONE (OUTPATIENT)
Dept: GASTROENTEROLOGY | Facility: MEDICAL CENTER | Age: 65
End: 2025-02-03

## 2025-02-03 ENCOUNTER — NURSE TRIAGE (OUTPATIENT)
Age: 65
End: 2025-02-03

## 2025-02-03 DIAGNOSIS — N30.90 CYSTITIS: ICD-10-CM

## 2025-02-03 LAB
APOB+LDLR+PCSK9 GENE MUT ANL BLD/T: NOT DETECTED
BRCA1+BRCA2 DEL+DUP + FULL MUT ANL BLD/T: NOT DETECTED
MLH1+MSH2+MSH6+PMS2 GN DEL+DUP+FUL M: NOT DETECTED

## 2025-02-03 NOTE — TELEPHONE ENCOUNTER
Our mutual patient, Nancy Jones, is scheduled for the following   procedure: Colonoscopy and Endoscopy   On: 4/7/25   With: Dr. Bernal    Nancy Jones is taking the following blood thinner: Eliquis       Can this be stopped 2 days prior to the procedure?         Thank you,  Dharmesh Mckenzie [facility]

## 2025-02-03 NOTE — TELEPHONE ENCOUNTER
"Patient calling to report that she is taking the diflucan and has one day left and feels her symptoms of vaginal itching and pain have come back.  Intermittent difficulty urinating with drips and drops, however she currently has stream when she urinates, states her water pill helps with this.   Reports stabbing pain in the bladder rated at a 7/10 on numerical pain scale, she just tried taking motrin to see if that will help. Feeling nausea as of this morning, will try her nausea medication to help with this. Feels like she was emptying bladder this morning but now she is not sure.     Denies fever, chills, vomiting.   Please advise.     Reason for Disposition  • Nursing judgment    Answer Assessment - Initial Assessment Questions  1. REASON FOR CALL: \"What is your main concern right now?\"    See note    Protocols used: No Protocol Available-Adult-[place]    "

## 2025-02-03 NOTE — TELEPHONE ENCOUNTER
Reason for call:   [x] Refill   [] Prior Auth  [] Other:     Office:   [] PCP/Provider -   [x] Specialty/Provider -uro/ DIAMOND TRACY    Medication:   phenazopyridine (PYRIDIUM) 200 mg tablet      Dose/Frequency: Sig: Take 1 tablet (200 mg total) by mouth 3 (three) times a day with meals      Quantity: 90    Pharmacy: [facility] [address] [place] [id number]  Phone: [phone]  Fax: [phone]    Does the patient have enough for 3 days?   [x] Yes   [] No - Send as HP to POD

## 2025-02-04 DIAGNOSIS — N39.0 RECURRENT UTI: Primary | ICD-10-CM

## 2025-02-04 DIAGNOSIS — N30.90 CYSTITIS: ICD-10-CM

## 2025-02-04 DIAGNOSIS — B37.41 YEAST CYSTITIS: ICD-10-CM

## 2025-02-04 RX ORDER — PHENAZOPYRIDINE HYDROCHLORIDE 200 MG/1
200 TABLET, FILM COATED ORAL
Qty: 90 TABLET | Refills: 5 | Status: SHIPPED | OUTPATIENT
Start: 2025-02-04

## 2025-02-04 RX ORDER — VORICONAZOLE 200 MG/1
200 TABLET, FILM COATED ORAL 2 TIMES DAILY
Qty: 28 TABLET | Refills: 0 | Status: SHIPPED | OUTPATIENT
Start: 2025-02-04 | End: 2025-02-18

## 2025-02-05 NOTE — TELEPHONE ENCOUNTER
Patient called in to inform us that she spoke with pharmacy and they have it after stating she no longer takes Amiodarone. Just wanted to inform us.

## 2025-02-05 NOTE — TELEPHONE ENCOUNTER
[facility] do not have Voriconazole.    [facility] ordered it and state it will be in 1-2 days    If patient finds it at another pharmacy she will call us back and inform us.

## 2025-02-06 ENCOUNTER — RESULTS FOLLOW-UP (OUTPATIENT)
Dept: UROLOGY | Facility: CLINIC | Age: 65
End: 2025-02-06

## 2025-02-10 DIAGNOSIS — M54.16 LUMBAR RADICULOPATHY: ICD-10-CM

## 2025-02-10 DIAGNOSIS — Z90.49 S/P SMALL BOWEL RESECTION: ICD-10-CM

## 2025-02-10 DIAGNOSIS — F41.9 ANXIETY: ICD-10-CM

## 2025-02-11 NOTE — TELEPHONE ENCOUNTER
Patient contacted the office this morning in regards to the refill request. She was going to [facility] today hoping to not have to make two trips to  prescription. Advised it was forwarded to pcp. Please contact patient once it has been sent to the pharmacy, thank you.

## 2025-02-12 RX ORDER — TRAMADOL HYDROCHLORIDE 50 MG/1
100 TABLET ORAL EVERY 8 HOURS PRN
Qty: 180 TABLET | Refills: 5 | Status: SHIPPED | OUTPATIENT
Start: 2025-02-12

## 2025-02-14 ENCOUNTER — TELEPHONE (OUTPATIENT)
Age: 65
End: 2025-02-14

## 2025-02-14 DIAGNOSIS — R11.0 NAUSEA: ICD-10-CM

## 2025-02-14 DIAGNOSIS — R39.9 UTI SYMPTOMS: ICD-10-CM

## 2025-02-14 DIAGNOSIS — Z12.31 ENCOUNTER FOR SCREENING MAMMOGRAM FOR BREAST CANCER: Primary | ICD-10-CM

## 2025-02-14 RX ORDER — PROMETHAZINE HYDROCHLORIDE 25 MG/1
TABLET ORAL
Qty: 60 TABLET | Refills: 2 | Status: SHIPPED | OUTPATIENT
Start: 2025-02-14

## 2025-02-14 NOTE — TELEPHONE ENCOUNTER
Patient calling because she got a reminder text that she needs a mammogram. Requesting that Dr. Ayoub order one for her. She would also like to ask Dr. Ayoub to order a UA C&S for her. States she is having burning, urgency,frequency, urine is cloudy and has a strong odor. She is currently taking diflucan ordered by the urologist. Advised that she may want to let her urologist know that she is having UTI symptoms, patient states she would like Dr. Ayoub to order it unless he wants her to call the urologist. Please f/u with patient.

## 2025-02-25 DIAGNOSIS — M25.562 ARTHRALGIA OF BOTH KNEES: ICD-10-CM

## 2025-02-25 DIAGNOSIS — M25.561 ARTHRALGIA OF BOTH KNEES: ICD-10-CM

## 2025-02-26 ENCOUNTER — CONSULT (OUTPATIENT)
Dept: SURGERY | Facility: CLINIC | Age: 65
End: 2025-02-26
Payer: COMMERCIAL

## 2025-02-26 VITALS
OXYGEN SATURATION: 94 % | WEIGHT: 205 LBS | RESPIRATION RATE: 16 BRPM | HEART RATE: 64 BPM | TEMPERATURE: 97.1 F | SYSTOLIC BLOOD PRESSURE: 136 MMHG | BODY MASS INDEX: 35 KG/M2 | HEIGHT: 64 IN | DIASTOLIC BLOOD PRESSURE: 90 MMHG

## 2025-02-26 DIAGNOSIS — N18.30 STAGE 3 CHRONIC KIDNEY DISEASE, UNSPECIFIED WHETHER STAGE 3A OR 3B CKD (HCC): ICD-10-CM

## 2025-02-26 DIAGNOSIS — K21.9 GASTROESOPHAGEAL REFLUX DISEASE WITHOUT ESOPHAGITIS: ICD-10-CM

## 2025-02-26 DIAGNOSIS — K43.2 INCISIONAL HERNIA, WITHOUT OBSTRUCTION OR GANGRENE: ICD-10-CM

## 2025-02-26 DIAGNOSIS — I48.0 PAROXYSMAL ATRIAL FIBRILLATION (HCC): Primary | ICD-10-CM

## 2025-02-26 DIAGNOSIS — I50.33 ACUTE ON CHRONIC DIASTOLIC (CONGESTIVE) HEART FAILURE (HCC): ICD-10-CM

## 2025-02-26 DIAGNOSIS — F41.9 ANXIETY: ICD-10-CM

## 2025-02-26 DIAGNOSIS — K43.9 VENTRAL HERNIA WITHOUT OBSTRUCTION OR GANGRENE: ICD-10-CM

## 2025-02-26 DIAGNOSIS — E66.01 OBESITY, MORBID (HCC): ICD-10-CM

## 2025-02-26 PROCEDURE — 99214 OFFICE O/P EST MOD 30 MIN: CPT | Performed by: SURGERY

## 2025-02-26 NOTE — ASSESSMENT & PLAN NOTE
Lab Results   Component Value Date    EGFR 68 01/22/2025    EGFR 57 08/08/2024    EGFR 79 05/01/2024    CREATININE 0.88 01/22/2025    CREATININE 1.02 08/08/2024    CREATININE 0.78 05/01/2024

## 2025-02-26 NOTE — ASSESSMENT & PLAN NOTE
I reviewed her most recent CT scan and compared it to the several ones before.  I read the operative note from her emergency surgery in 2023.  I showed her the image and discussed options.  Due to the multiple fascial defects and the distance from the most superior to the most inferior defect I do recommend proceeding with an open approach.  Will plan for an open approach with a retrorectus release and possible component separation if needed.  She is very concerned about mesh and we talked about options and we will plan to use absorbable Phasix mesh at the time of surgery.  Explained the preoperative plan and postoperative course.  She is scheduled for a colonoscopy and EGD in April and I recommend having those done first prior to any incisional hernia repair.  Will schedule her for after her procedures and I will see her back at that time.

## 2025-02-26 NOTE — PROGRESS NOTES
Name: Nancy Jones      : 1960      MRN: [id number]  Encounter Provider: Jewel Rankin MD  Encounter Date: 2025   Encounter department: [facility] GENERAL SURGERY [place]  :  Assessment & Plan  Ventral hernia without obstruction or gangrene    Orders:    Ambulatory Referral to General Surgery    Paroxysmal atrial fibrillation (HCC)  Will ensure cardiac wise she has been evaluated and no further testing needed.  Also will need to hold her anticoagulation         Acute on chronic diastolic (congestive) heart failure (HCC)  Wt Readings from Last 3 Encounters:   25 93 kg (205 lb)   25 94.8 kg (209 lb)   25 97.2 kg (214 lb 3.2 oz)                    Gastroesophageal reflux disease without esophagitis         Stage 3 chronic kidney disease, unspecified whether stage 3a or 3b CKD (HCC)  Lab Results   Component Value Date    EGFR 68 2025    EGFR 57 2024    EGFR 79 2024    CREATININE 0.88 2025    CREATININE 1.02 2024    CREATININE 0.78 2024            Anxiety         Incisional hernia, without obstruction or gangrene  I reviewed her most recent CT scan and compared it to the several ones before.  I read the operative note from her emergency surgery in .  I showed her the image and discussed options.  Due to the multiple fascial defects and the distance from the most superior to the most inferior defect I do recommend proceeding with an open approach.  Will plan for an open approach with a retrorectus release and possible component separation if needed.  She is very concerned about mesh and we talked about options and we will plan to use absorbable Phasix mesh at the time of surgery.  Explained the preoperative plan and postoperative course.  She is scheduled for a colonoscopy and EGD in April and I recommend having those done first prior to any incisional hernia repair.  Will schedule her for after her procedures and I will see her back at that  "time.         Obesity, morbid (HCC)  If possible do recommend losing weight to help with her preoperative recovery.             History of Present Illness   Nancy Jones is a 65 y.o. female who presents for evaluation of incisional hernia.  She has a history of and emergency exploratory laparotomy resection of her transverse colon down to sigmoid due to ischemia.  She unfortunate developed incisional hernias.  She was seen and tentatively scheduled in the past for this but had to reschedule and she is now here to resume care.  She does have some fullness and discomfort along her midline.  She does have some constipation symptoms and can get back to very easily and have to almost push things through.  She has some crampy pain across her abdomen to the right as well.  She also been having increasing acid reflux type symptoms.  HPI  Review of Systems as per HPI.  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\"    History of pneumonia     Hives     Hyperlipidemia     Hypertension     Infertility, female     Interstitial cystitis     Migraine     sees neurologist    Motion sickness     Obesity     Palpitations     PONV (postoperative nausea and vomiting)     Premature atrial contractions 11/09/2022    Psychiatric disorder     TIA (transient ischemic attack) 09/2022    per pt --\"had MRI and 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\"    Urinary incontinence     wears Pads    Venous insufficiency 08/01/2017    Wears glasses      Past Surgical History:   Procedure Laterality Date    COLONOSCOPY      DILATION AND CURETTAGE OF " UTERUS      EGD      EXPLORATORY LAPAROTOMY      for infertility    EXPLORATORY LAPAROTOMY W/ BOWEL RESECTION N/A 7/19/2023    Procedure: LAPAROTOMY EXPLORATORY W/ BOWEL RESECTION;  Surgeon: Crista Recinos MD;  Location: [facility];  Service: General    HAND SURGERY      Hand excision of tendon cyst    FL LAPAROSCOPIC APPENDECTOMY N/A 05/03/2022    Procedure: APPENDECTOMY LAPAROSCOPIC;  Surgeon: Vin Fields MD;  Location: [facility];  Service: General    FL LAPS ABD PRTM&OMENTUM DX W/WO SPEC BR/WA SPX N/A 7/19/2023    Procedure: LAPAROSCOPY DIAGNOSTIC, LYSIS OF ADHESIONS, LAPAROSCOPY TAKE TAKEDOWN OF LEFT COLON, EXPLORATORY LAPAROSCOPY, LYSIS OF ADHESIONS, RESECTION OF TRANSVERSE COLON SPLENIC FLEXURE AND DESCENDING COLON , TAKE DOWN OF SPLENIC FLEXURE, SIGMOIDOSCOPY, MOBILIZATION OF RIGHT COLON, PRIMARY ANASTOMOSIS EEA 29, TAP BLOCK;  Surgeon: Crista Recinos MD;  Location: [facility];  Service: General    FL TCAT IV STENT CRV CRTD ART EMBOLIC PROTECJ Left 4/29/2024    Procedure: ANGIOPLASTY ARTERY CAROTID W/ STENT;  Surgeon: Roberto García DO;  Location: [facility];  Service: Vascular    FL TEAEC W/PATCH GRF CAROTID VERTB SUBCLAV NECK INC Left 1/31/2023    Procedure: EXPLORATION OF LEFT CAROTID ARTERY; ABORTED ENDARTERECTOMY ARTERY CAROTID;  Surgeon: Roberto García DO;  Location: [facility];  Service: Vascular    SUPERIOR MESTENTERIC ARTERY STENT      WISDOM TOOTH EXTRACTION       Family History   Problem Relation Age of Onset    Lung cancer Mother 60    Brain cancer Mother 60    Diabetes Father     Depression Father     Other Father         septic    Allergies Sister     Hashimoto's thyroiditis Sister     Abdominal aortic aneurysm Sister     Diabetes Sister     No Known Problems Sister     No Known Problems Maternal Grandmother     No Known Problems Maternal Grandfather     No Known Problems Paternal Grandmother     No Known Problems Paternal Grandfather     Hodgkin's lymphoma Brother     No Known  Problems Brother     No Known Problems Maternal Aunt     Diabetes Other     Breast cancer Neg Hx       reports that she quit smoking about 6 years ago. Her smoking use included cigarettes. She started smoking about 16 years ago. She has a 5 pack-year smoking history. She has been exposed to tobacco smoke. She has never used smokeless tobacco. She reports that she does not currently use alcohol. She reports that she does not use drugs.  Current Outpatient Medications   Medication Instructions    acetaminophen (TYLENOL) 650 mg, Oral, Every 6 hours scheduled    apixaban (ELIQUIS) 5 mg, Oral, 2 times daily    butalbital-acetaminophen-caffeine (FIORICET,ESGIC) -40 mg per tablet 1 tablet, Oral, Every 4 hours PRN    chlordiazepoxide-clidinium (LIBRAX) 5-2.5 mg per capsule TAKE 1 CAPSULE BY MOUTH TWO TIMES DAILY AS NEEDED FOR INDIGESTION    chlorhexidine (PERIDEX) 0.12 % solution 15 mL, Mouth/Throat, 2 times daily    dexamethasone (DECADRON) 2 mg tablet One tab with breakfast (early in day to minimize impact on sleep, with food for stomach protection) for 1-5 days for unrelenting migraine    Diclofenac Sodium (VOLTAREN) 2 g, Topical, 4 times daily    diltiazem (CARDIZEM CD) 180 mg, Oral, Daily    Docusate Sodium (COLACE PO) Daily at bedtime    ezetimibe (ZETIA) 10 mg, Oral, Daily    famciclovir (FAMVIR) 500 mg, Oral, 3 times daily    fexofenadine (ALLEGRA) 180 mg, Daily    fluticasone-vilanterol (BREO ELLIPTA) 200-25 MCG/INH inhaler 1 puff, Inhalation, Daily, Rinse mouth after use.    furosemide (LASIX) 40 mg, Oral, Daily    LORazepam (ATIVAN) 1 mg, Oral, 3 times daily    LORazepam (ATIVAN) 2 mg, Oral, Every 8 hours PRN    magnesium Oxide (MAG-OX) 400 mg, Oral, Daily at bedtime    methocarbamol (ROBAXIN) 1,000 mg, Oral, 3 times daily    metoprolol succinate (TOPROL-XL) 100 mg, Oral, 2 times daily    omeprazole (PRILOSEC) 40 mg, Oral, Daily    ondansetron (ZOFRAN-ODT) 4 mg, Oral, Every 8 hours PRN    phenazopyridine  "(PYRIDIUM) 200 mg, Oral, 3 times daily with meals    phenazopyridine (PYRIDIUM) 200 mg, Oral, 3 times daily with meals    polyethylene glycol (MIRALAX) 17 g, Oral, Daily PRN    promethazine (PHENERGAN) 25 mg tablet TAKE 1 TABLET BY MOUTH EVERY 6 HOURS AS NEEDED FOR NAUSEA AND/OR VOMITING    spironolactone (ALDACTONE) 25 mg, Oral, 2 times daily    sucralfate (CARAFATE) 1 g, Oral, 4 times daily    topiramate (TOPAMAX) 25 mg tablet 25 mg p.o. in a.m. and p.m. for 1 week, then increase as tolerated to 25 mg QAM and 50 mg QHS for 1 week and finish at 50 mg in a.m. and p.m.    traMADol (ULTRAM) 100 mg, Oral, Every 8 hours PRN    triamcinolone (KENALOG) 0.1 % ointment APPLY TOPICALLY TO AFFECTED AREA(S) TWO TIMES DAILY     Allergies   Allergen Reactions    Ace Inhibitors Angioedema and Anaphylaxis     Anaphylaxis    Benicar [Olmesartan] Angioedema     See Allergy note from 9/11/2008.  Swollen ankles/legs    Hydralazine Other (See Comments)     Lip swelling  Flank pain    Other Anaphylaxis and Other (See Comments)     Preservatives- itching throat closes, hi  E Z Cat scan contrast - hives throat closes itching,  Preservatives- itching throat closes, hi  Artificial sweeteners    Valsartan Angioedema     Lips, face swollen    Ampicillin Hives     Depends on brand some preservatives can react. Has recently tolerated oral amoxicillin.    Sulfa Antibiotics Hives     stuffiness,itching,hives,throat closing--per pt \"sometimes able to take depending on the brand and the filler or possibly preservatives in it\"    Motrin [Ibuprofen] Other (See Comments)     Per pt \" told not to take due to A Fib\"    Wound Dressing Adhesive Other (See Comments)     Per pt Adhesive on EKG leads caused redness      Current Outpatient Medications on File Prior to Visit   Medication Sig Dispense Refill    acetaminophen (TYLENOL) 325 mg tablet Take 2 tablets (650 mg total) by mouth every 6 (six) hours  0    apixaban (Eliquis) 5 mg Take 1 tablet (5 mg " total) by mouth 2 (two) times a day 180 tablet 3    butalbital-acetaminophen-caffeine (FIORICET,ESGIC) -40 mg per tablet Take 1 tablet by mouth every 4 (four) hours as needed for headaches 20 tablet 0    chlordiazepoxide-clidinium (LIBRAX) 5-2.5 mg per capsule TAKE 1 CAPSULE BY MOUTH TWO TIMES DAILY AS NEEDED FOR INDIGESTION 60 capsule 2    chlorhexidine (PERIDEX) 0.12 % solution Apply 15 mL to the mouth or throat 2 (two) times a day 120 mL 0    dexamethasone (DECADRON) 2 mg tablet One tab with breakfast (early in day to minimize impact on sleep, with food for stomach protection) for 1-5 days for unrelenting migraine 5 tablet 0    Diclofenac Sodium (VOLTAREN) 1 % Apply 2 g topically 4 (four) times a day 100 g 1    diltiazem (CARDIZEM CD) 180 mg 24 hr capsule Take 1 capsule (180 mg total) by mouth daily 90 capsule 3    Docusate Sodium (COLACE PO) Take by mouth daily at bedtime      ezetimibe (ZETIA) 10 mg tablet Take 1 tablet (10 mg total) by mouth daily 30 tablet 5    fluticasone-vilanterol (BREO ELLIPTA) 200-25 MCG/INH inhaler Inhale 1 puff daily Rinse mouth after use. 3 Inhaler 3    furosemide (LASIX) 40 mg tablet Take 1 tablet (40 mg total) by mouth daily 90 tablet 1    LORazepam (ATIVAN) 2 mg tablet Take 1 tablet (2 mg total) by mouth every 8 (eight) hours as needed for anxiety 90 tablet 0    magnesium Oxide (MAG-OX) 400 mg TABS Take 1 tablet (400 mg total) by mouth daily at bedtime 90 tablet 3    methocarbamol (ROBAXIN) 500 mg tablet TAKE TWO TABLETS BY MOUTH THREE TIMES A  tablet 2    metoprolol succinate (TOPROL-XL) 100 mg 24 hr tablet Take 1 tablet (100 mg total) by mouth 2 (two) times a day 180 tablet 1    omeprazole (PriLOSEC) 40 MG capsule Take 1 capsule (40 mg total) by mouth daily 90 capsule 1    ondansetron (ZOFRAN-ODT) 4 mg disintegrating tablet Take 1 tablet (4 mg total) by mouth every 8 (eight) hours as needed for nausea 21 tablet 5    phenazopyridine (PYRIDIUM) 200 mg tablet Take 1  tablet (200 mg total) by mouth 3 (three) times a day with meals 10 tablet 0    phenazopyridine (PYRIDIUM) 200 mg tablet Take 1 tablet (200 mg total) by mouth 3 (three) times a day with meals 90 tablet 5    polyethylene glycol (MIRALAX) 17 g packet Take 17 g by mouth daily as needed (constipation)      promethazine (PHENERGAN) 25 mg tablet TAKE 1 TABLET BY MOUTH EVERY 6 HOURS AS NEEDED FOR NAUSEA AND/OR VOMITING 60 tablet 2    spironolactone (ALDACTONE) 25 mg tablet Take 1 tablet (25 mg total) by mouth 2 (two) times a day 180 tablet 1    sucralfate (CARAFATE) 1 g tablet TAKE 1 TABLET BY MOUTH FOUR TIMES DAILY 120 tablet 5    traMADol (ULTRAM) 50 mg tablet Take 2 tablets (100 mg total) by mouth every 8 (eight) hours as needed for moderate pain 180 tablet 5    famciclovir (FAMVIR) 500 mg tablet Take 1 tablet (500 mg total) by mouth 3 (three) times a day for 7 days (Patient not taking: Reported on 2/26/2025) 21 tablet 0    fexofenadine (ALLEGRA) 180 MG tablet Take 180 mg by mouth daily (Patient not taking: Reported on 2/26/2025)      LORazepam (ATIVAN) 1 mg tablet Take 1 tablet (1 mg total) by mouth 3 (three) times a day (Patient not taking: Reported on 2/26/2025) 90 tablet 0    topiramate (TOPAMAX) 25 mg tablet 25 mg p.o. in a.m. and p.m. for 1 week, then increase as tolerated to 25 mg QAM and 50 mg QHS for 1 week and finish at 50 mg in a.m. and p.m. (Patient not taking: Reported on 2/26/2025) 120 tablet 4    triamcinolone (KENALOG) 0.1 % ointment APPLY TOPICALLY TO AFFECTED AREA(S) TWO TIMES DAILY (Patient not taking: Reported on 2/26/2025) 15 g 2     Current Facility-Administered Medications on File Prior to Visit   Medication Dose Route Frequency Provider Last Rate Last Admin    cyanocobalamin injection 1,000 mcg  1,000 mcg Intramuscular Q30 Days Coy Ayoub DO   1,000 mcg at 11/16/23 1151      Social History     Tobacco Use    Smoking status: Former     Current packs/day: 0.00     Average packs/day: 0.5 packs/day  "for 10.0 years (5.0 ttl pk-yrs)     Types: Cigarettes     Start date:      Quit date: 2019     Years since quittin.1     Passive exposure: Past    Smokeless tobacco: Never   Vaping Use    Vaping status: Never Used   Substance and Sexual Activity    Alcohol use: Not Currently    Drug use: No    Sexual activity: Not Currently     Comment: defer        Objective   /90 (BP Location: Left arm, Patient Position: Sitting, Cuff Size: Large)   Pulse 64   Temp (!) 97.1 °F (36.2 °C) (Tympanic)   Resp 16   Ht 5' 4\" (1.626 m)   Wt 93 kg (205 lb)   LMP  (LMP Unknown)   SpO2 94%   BMI 35.19 kg/m²      Physical Exam  Vitals and nursing note reviewed.   Constitutional:       General: She is not in acute distress.     Appearance: She is well-developed. She is not diaphoretic.   HENT:      Head: Normocephalic and atraumatic.   Eyes:      Pupils: Pupils are equal, round, and reactive to light.   Cardiovascular:      Rate and Rhythm: Normal rate.   Pulmonary:      Effort: Pulmonary effort is normal. No respiratory distress.   Abdominal:      General: Bowel sounds are normal.      Palpations: Abdomen is soft.      Comments: Large midline scar.  Several incisional hernias with the largest being at the umbilicus into the left.   Musculoskeletal:         General: Normal range of motion.      Cervical back: Normal range of motion and neck supple.   Skin:     General: Skin is warm and dry.   Neurological:      Mental Status: She is alert and oriented to person, place, and time.   Psychiatric:         Behavior: Behavior normal.           Radiology Results Review: I personally reviewed the following image studies in PACS and associated radiology reports: CT abdomen/pelvis. My interpretation of the radiology images/reports is: Multiple fascial defects starting high near the xiphoid with the edge of the liver fossa protruding all way down to both umbilicus.  There is good fascia remaining inferiorly.  The most inferior " hernia is containing loops of small bowel..

## 2025-02-26 NOTE — ASSESSMENT & PLAN NOTE
Wt Readings from Last 3 Encounters:   02/26/25 93 kg (205 lb)   01/31/25 94.8 kg (209 lb)   01/29/25 97.2 kg (214 lb 3.2 oz)

## 2025-02-26 NOTE — ASSESSMENT & PLAN NOTE
Will ensure cardiac wise she has been evaluated and no further testing needed.  Also will need to hold her anticoagulation

## 2025-02-27 ENCOUNTER — TELEPHONE (OUTPATIENT)
Age: 65
End: 2025-02-27

## 2025-02-27 DIAGNOSIS — F41.9 ANXIETY: ICD-10-CM

## 2025-02-27 DIAGNOSIS — G43.001 MIGRAINE WITHOUT AURA AND WITH STATUS MIGRAINOSUS, NOT INTRACTABLE: ICD-10-CM

## 2025-02-27 NOTE — TELEPHONE ENCOUNTER
Blood work is expected in July (6 months from order date). Placed call to patient to let her know blood work not due yet. She verbalized understanding and has no further questions or concerns at this time.

## 2025-02-27 NOTE — TELEPHONE ENCOUNTER
Received call from patient regarding the Lipid Panel ordered by Dr. Lynn on 1/20/25. She just had a Lipid panel on 1/18 and is worried her insurance won't cover another one this soon. She wants to confirm that she is not to get this lab work done yet. Please confirm.

## 2025-02-28 RX ORDER — LORAZEPAM 2 MG/1
2 TABLET ORAL EVERY 8 HOURS PRN
Qty: 90 TABLET | Refills: 5 | Status: SHIPPED | OUTPATIENT
Start: 2025-02-28

## 2025-02-28 RX ORDER — BUTALBITAL, ACETAMINOPHEN AND CAFFEINE 50; 325; 40 MG/1; MG/1; MG/1
1 TABLET ORAL EVERY 4 HOURS PRN
Qty: 20 TABLET | Refills: 0 | Status: SHIPPED | OUTPATIENT
Start: 2025-02-28

## 2025-03-03 ENCOUNTER — OFFICE VISIT (OUTPATIENT)
Dept: CARDIOLOGY CLINIC | Facility: CLINIC | Age: 65
End: 2025-03-03
Payer: COMMERCIAL

## 2025-03-03 VITALS
BODY MASS INDEX: 35.34 KG/M2 | OXYGEN SATURATION: 98 % | DIASTOLIC BLOOD PRESSURE: 70 MMHG | HEIGHT: 64 IN | WEIGHT: 207 LBS | SYSTOLIC BLOOD PRESSURE: 118 MMHG | HEART RATE: 56 BPM

## 2025-03-03 DIAGNOSIS — I25.10 CORONARY ARTERY DISEASE INVOLVING NATIVE CORONARY ARTERY OF NATIVE HEART WITHOUT ANGINA PECTORIS: Primary | ICD-10-CM

## 2025-03-03 PROCEDURE — 99214 OFFICE O/P EST MOD 30 MIN: CPT | Performed by: INTERNAL MEDICINE

## 2025-03-03 RX ORDER — EVOLOCUMAB 140 MG/ML
140 INJECTION, SOLUTION SUBCUTANEOUS
Qty: 6 ML | Refills: 5 | Status: SHIPPED | OUTPATIENT
Start: 2025-03-03

## 2025-03-03 NOTE — PROGRESS NOTES
Cardiology             Nancy Jones  1960  [id number]                Assessment/Plan:     Paroxysmal atrial fibrillation, diagnosed on Holter monitor 03/2020, with recurrence during episode of GI bleed 7/2023, maintained on Eliquis anticoagulation  Hypertension  Multiple drug intolerances and possible allergies  Probable heterozygous familial hypercholesterolemia, on Repatha  Mild to moderate aortic regurgitation  Obesity   Sleep apnea, compliant with CPAP therapy   Lower extremity edema, on furosemide, resolved              Patient with rare palpitations which are short lasting.  Will continue surveillance.  Patient will call if palpitations become any worse.  Continue metoprolol succinate 100 mg p.o. twice daily with Eliquis anticoagulation for PAF.  Blood pressure controlled, continue metoprolol, spironolactone  Euvolemic by examination, continue furosemide 40 mg daily.  Prior echocardiogram 1/2024 clear stress test 1/2024 unremarkable.  Patient was gene negative for FH on helix test.  Prior lipid panel reviewed from 1/18/2025, LDL cholesterol markedly elevated 224 mg/dL.  In the setting of severe dyslipidemia, and complete statin intolerance as well as intolerance to ezetimibe, will try to get Repatha approved.  She thinks this will get approved but may need peer to peer review.  Will plan on repeating echocardiogram in 1 to 2 years to reevaluate aortic regurgitation  Suspect low to moderate cardiac risk for hernia surgery.  Patient will get EKG within 1 month of surgery which is scheduled for May and will call me to review.  She will call if any new or worsening cardiac symptoms prior to her surgery.  Okay to hold Eliquis 2 days prior.           Follow-up with me in 6 months             Interval History:      This is a 65-year-old female diagnosed with paroxysmal atrial fibrillation on Holter monitoring 3/020.  That time echocardiogram had revealed mild-to-moderate aortic regurgitation with normal  "left ventricular systolic function.  Nuclear stress test June 2020 was unremarkable with normal perfusion.     She has hypertension, and has multiple medications listed as allergies including beta-blockers, although she has been taking metoprolol for quite some time.  Although on her allergy list HCTZ states “throat closing,\" she states that actually cause some ankle swelling along with amlodipine.  Atorvastatin caused joint pain, although it is listed in her allergies also as “throat closing. ”  She seems to have had a true allergic reaction to hydralazine and valsartan.  She states valsartan caused angioedema, and she does not remember what happened with hydralazine, although states she thinks it may have been ankle swelling.     She was last seen by Dr. Ray 01/28/2021 at which time she was maintained on metoprolol succinate and spironolactone for hypertension.  At 1 point she was asked to take diltiazem as needed for palpitations.  She has been maintained on Eliquis anticoagulation.       She underwent a Zio monitor 03/2021 at [facility](should be scanned into media section, personally reviewed rhythm strips) revealing normal sinus rhythm with 8 runs of SVT, longest lasting 17 beats.  There were 18 patient triggers, most of which correlated with PACs, short atrial runs, and PVCs.  There was no evidence of atrial fibrillation.     Nuclear stress test 06/2020 was unremarkable     In August 2021 she was hospitalized at [facility] for epigastric and left upper quadrant pain, admitted for superior mesenteric artery thrombosis.  She received heparin drip, and underwent SMA stenting with IR on 08/19/2021.  She was initiated on Plavix, and continued on Eliquis.     During her prior visit 10/15/2021 she was initiated on diltiazem which she later stopped due to headache and nausea.  Subsequent nifedipine was tried which caused headache and nausea as well which she stopped on her own.  Verapamil caused increase in " palpitations.     She went to [facility] ER due to urinary retention and was reported to be in atrial fibrillation.  ZIO monitor 10/2022 revealed episodes of paroxysmal atrial fibrillation which correlated with her triggered symptoms.  Echocardiogram 12/16/2022 demonstrated normal left ejection fraction of 55% with mild mitral and aortic regurgitation.  Nuclear stress test 1/17/2023 demonstrated no evidence of myocardial ischemia.  She was seen by electrophysiology and was initiated on flecainide which she did not tolerate well due to side effects.  She refused ablation, therefore was placed on diltiazem.     On 1/31/2023 she underwent exploration of left carotid artery for CEA, and this was aborted, as the left carotid bifurcation was high and deep in the neck with the hypoglossal nerve overriding the bifurcation.  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     Echocardiogram 2/21/2023 demonstrated left ejection fraction 60% with mild right ventricular dilatation and mild to moderate aortic regurgitation.     During her prior visit 3/20/2023, diltiazem was increased from 120 mg to 180 mg daily in light of recurrent atrial fibrillation with RVR at 111 bpm.  She refused another consultation with electrophysiology.  She was unable to tolerate flecainide due to increased palpitations, therefore was reluctant to try any more medications and refused ablation as well.     She was started on Diamox thereafter by neurology for signs of increased intracranial pressure on MRI.     During Adina's last visit 1/2023 she had complaints of chest discomfort, dyspnea, and palpitations.  Nuclear stress test 1/31/2024 was unremarkable.  Zio monitor 3/6/2024 was within normal limits with symptoms correlating with sinus rhythm only.  Echocardiogram 1/31/2024 demonstrated normal left ventricular systolic function, with ejection  "fraction 65% with mild aortic regurgitation.     She underwent successful left TCAR on 4/29/2024.    She completely stopped amiodarone on 1/17/2025 after she felt some visual disturbances with the same.  She also complains of \"feeling weird\" on amiodarone during a phone call 12/10/2024.  She states lowering the dose of amiodarone did not help.    Genetic testing was negative for FH 1/2025.    She presents today for follow-up with no complaints.  She states she rarely gets palpitations.  She denies chest pain, shortness breath, dizziness, lower extreme edema.  She is scheduled for hernia surgery in May.          Vitals:  Vitals:    03/03/25 0826   BP: 118/70   Pulse: 56   SpO2: 98%   Weight: 93.9 kg (207 lb)   Height: 5' 4\" (1.626 m)         Past Medical History:   Diagnosis Date   • A-fib ([facility]) 03/2020   • Allergic    • Anxiety    • Arthritis    • Asthma    • Back pain     and muscle pain   • Bruises easily    • Chronic pain disorder     Interstitial pain   • Colon polyp    • Dental bridge present    • Depression    • Eczema    • GERD (gastroesophageal reflux disease)    • Heart murmur    • History of blood clots     per pt \"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\"   • History of pneumonia    • Hives    • Hyperlipidemia    • Hypertension    • Infertility, female    • Interstitial cystitis    • Migraine     sees neurologist   • Motion sickness    • Obesity    • Palpitations    • PONV (postoperative nausea and vomiting)    • Premature atrial contractions 11/09/2022   • Psychiatric disorder    • TIA (transient ischemic attack) 09/2022    per pt --\"had MRI and 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\"   • Urinary incontinence     wears Pads   • Venous insufficiency 08/01/2017   • Wears glasses      Social History     Socioeconomic History   • Marital status:      Spouse name: Not on file   • Number of children: 0   • Years of education: Not on file   • Highest education level: Not on file "   Occupational History   • Occupation: retired   Tobacco Use   • Smoking status: Former     Current packs/day: 0.00     Average packs/day: 0.5 packs/day for 10.0 years (5.0 ttl pk-yrs)     Types: Cigarettes     Start date:      Quit date: 2019     Years since quittin.1     Passive exposure: Past   • Smokeless tobacco: Never   Vaping Use   • Vaping status: Never Used   Substance and Sexual Activity   • Alcohol use: Not Currently   • Drug use: No   • Sexual activity: Not Currently     Comment: defer   Other Topics Concern   • Not on file   Social History Narrative    Who lives in your home: Alone     What type of home do you live in: Apartment     Age of your home: 1952 built     How long have you been living there: 5 yrs     Type of heat: forced hot air     Type of fuel: electric     What type of jamin is in your bedroom: carpet jamin     Do you have the following in or near your home:    Air products: Humidifier in the bedroom/kitchen     Pests: none     Pets: none     Are pets allowed in bedroom: N/A     Open fields, wooded areas nearby: No     Basement: [id number] mold     Exposure to second hand smoke: No    Central air     Habits:    Caffeine: Hot tea 1 cup daily- ice tea 1 cup in the afternoon    Chocolate: Occasionally      Social Drivers of Health     Financial Resource Strain: Medium Risk (10/2/2023)    Overall Financial Resource Strain (CARDIA)    • Difficulty of Paying Living Expenses: Somewhat hard   Food Insecurity: No Food Insecurity (2024)    Hunger Vital Sign    • Worried About Running Out of Food in the Last Year: Never true    • Ran Out of Food in the Last Year: Never true   Transportation Needs: No Transportation Needs (2024)    PRAPARE - Transportation    • Lack of Transportation (Medical): No    • Lack of Transportation (Non-Medical): No   Physical Activity: Inactive (2021)    Exercise Vital Sign    • Days of Exercise per Week: 0 days    • Minutes of  Exercise per Session: 0 min   Stress: Not on file   Social Connections: Unknown (6/18/2024)    Received from [facility]    Social Connections    • How often do you feel lonely or isolated from those around you? (Adult - for ages 18 years and over): Not on file   Intimate Partner Violence: Not At Risk (5/24/2021)    Humiliation, Afraid, Rape, and Kick questionnaire    • Fear of Current or Ex-Partner: No    • Emotionally Abused: No    • Physically Abused: No    • Sexually Abused: No   Housing Stability: Low Risk  (12/5/2024)    Housing Stability Vital Sign    • Unable to Pay for Housing in the Last Year: No    • Number of Times Moved in the Last Year: 0    • Homeless in the Last Year: No      Family History   Problem Relation Age of Onset   • Lung cancer Mother 60   • Brain cancer Mother 60   • Diabetes Father    • Depression Father    • Other Father         septic   • Allergies Sister    • Hashimoto's thyroiditis Sister    • Abdominal aortic aneurysm Sister    • Diabetes Sister    • No Known Problems Sister    • No Known Problems Maternal Grandmother    • No Known Problems Maternal Grandfather    • No Known Problems Paternal Grandmother    • No Known Problems Paternal Grandfather    • Hodgkin's lymphoma Brother    • No Known Problems Brother    • No Known Problems Maternal Aunt    • Diabetes Other    • Breast cancer Neg Hx      Past Surgical History:   Procedure Laterality Date   • COLONOSCOPY     • DILATION AND CURETTAGE OF UTERUS     • EGD     • EXPLORATORY LAPAROTOMY      for infertility   • EXPLORATORY LAPAROTOMY W/ BOWEL RESECTION N/A 7/19/2023    Procedure: LAPAROTOMY EXPLORATORY W/ BOWEL RESECTION;  Surgeon: Crista Recinos MD;  Location: [facility];  Service: General   • HAND SURGERY      Hand excision of tendon cyst   • WA LAPAROSCOPIC APPENDECTOMY N/A 05/03/2022    Procedure: APPENDECTOMY LAPAROSCOPIC;  Surgeon: Vin Fields MD;  Location:  MAIN OR;  Service: General   • WA LAPS ABD PRTM&OMENTUM DX  W/WO SPEC BR/WA SPX N/A 7/19/2023    Procedure: LAPAROSCOPY DIAGNOSTIC, LYSIS OF ADHESIONS, LAPAROSCOPY TAKE TAKEDOWN OF LEFT COLON, EXPLORATORY LAPAROSCOPY, LYSIS OF ADHESIONS, RESECTION OF TRANSVERSE COLON SPLENIC FLEXURE AND DESCENDING COLON , TAKE DOWN OF SPLENIC FLEXURE, SIGMOIDOSCOPY, MOBILIZATION OF RIGHT COLON, PRIMARY ANASTOMOSIS EEA 29, TAP BLOCK;  Surgeon: Crista Recinos MD;  Location: [facility];  Service: General   • IN TCAT IV STENT CRV CRTD ART EMBOLIC PROTECJ Left 4/29/2024    Procedure: ANGIOPLASTY ARTERY CAROTID W/ STENT;  Surgeon: Roberto García DO;  Location: [facility];  Service: Vascular   • IN TEAEC W/PATCH GRF CAROTID VERTB SUBCLAV NECK INC Left 1/31/2023    Procedure: EXPLORATION OF LEFT CAROTID ARTERY; ABORTED ENDARTERECTOMY ARTERY CAROTID;  Surgeon: Roberto García DO;  Location: [facility];  Service: Vascular   • SUPERIOR MESTENTERIC ARTERY STENT     • WISDOM TOOTH EXTRACTION         Current Outpatient Medications:   •  acetaminophen (TYLENOL) 325 mg tablet, Take 2 tablets (650 mg total) by mouth every 6 (six) hours, Disp: , Rfl: 0  •  apixaban (Eliquis) 5 mg, Take 1 tablet (5 mg total) by mouth 2 (two) times a day, Disp: 180 tablet, Rfl: 3  •  butalbital-acetaminophen-caffeine (FIORICET,ESGIC) -40 mg per tablet, Take 1 tablet by mouth every 4 (four) hours as needed for headaches, Disp: 20 tablet, Rfl: 0  •  chlordiazepoxide-clidinium (LIBRAX) 5-2.5 mg per capsule, TAKE 1 CAPSULE BY MOUTH TWO TIMES DAILY AS NEEDED FOR INDIGESTION, Disp: 60 capsule, Rfl: 2  •  chlorhexidine (PERIDEX) 0.12 % solution, Apply 15 mL to the mouth or throat 2 (two) times a day, Disp: 120 mL, Rfl: 0  •  dexamethasone (DECADRON) 2 mg tablet, One tab with breakfast (early in day to minimize impact on sleep, with food for stomach protection) for 1-5 days for unrelenting migraine, Disp: 5 tablet, Rfl: 0  •  Diclofenac Sodium (VOLTAREN) 1 %, Apply 2 g topically 4 (four) times a day, Disp: 100 g, Rfl:  1  •  diltiazem (CARDIZEM CD) 180 mg 24 hr capsule, Take 1 capsule (180 mg total) by mouth daily, Disp: 90 capsule, Rfl: 3  •  Docusate Sodium (COLACE PO), Take by mouth daily at bedtime, Disp: , Rfl:   •  Evolocumab (Repatha SureClick) 140 MG/ML SOAJ, Inject 1 mL (140 mg total) under the skin every 14 (fourteen) days, Disp: 6 mL, Rfl: 5  •  fluticasone-vilanterol (BREO ELLIPTA) 200-25 MCG/INH inhaler, Inhale 1 puff daily Rinse mouth after use., Disp: 3 Inhaler, Rfl: 3  •  furosemide (LASIX) 40 mg tablet, Take 1 tablet (40 mg total) by mouth daily, Disp: 90 tablet, Rfl: 1  •  LORazepam (ATIVAN) 2 mg tablet, Take 1 tablet (2 mg total) by mouth every 8 (eight) hours as needed for anxiety, Disp: 90 tablet, Rfl: 5  •  magnesium Oxide (MAG-OX) 400 mg TABS, Take 1 tablet (400 mg total) by mouth daily at bedtime, Disp: 90 tablet, Rfl: 3  •  methocarbamol (ROBAXIN) 500 mg tablet, TAKE TWO TABLETS BY MOUTH THREE TIMES A DAY, Disp: 180 tablet, Rfl: 2  •  metoprolol succinate (TOPROL-XL) 100 mg 24 hr tablet, Take 1 tablet (100 mg total) by mouth 2 (two) times a day, Disp: 180 tablet, Rfl: 1  •  omeprazole (PriLOSEC) 40 MG capsule, Take 1 capsule (40 mg total) by mouth daily, Disp: 90 capsule, Rfl: 1  •  ondansetron (ZOFRAN-ODT) 4 mg disintegrating tablet, Take 1 tablet (4 mg total) by mouth every 8 (eight) hours as needed for nausea, Disp: 21 tablet, Rfl: 5  •  phenazopyridine (PYRIDIUM) 200 mg tablet, Take 1 tablet (200 mg total) by mouth 3 (three) times a day with meals, Disp: 10 tablet, Rfl: 0  •  phenazopyridine (PYRIDIUM) 200 mg tablet, Take 1 tablet (200 mg total) by mouth 3 (three) times a day with meals, Disp: 90 tablet, Rfl: 5  •  polyethylene glycol (MIRALAX) 17 g packet, Take 17 g by mouth daily as needed (constipation), Disp: , Rfl:   •  promethazine (PHENERGAN) 25 mg tablet, TAKE 1 TABLET BY MOUTH EVERY 6 HOURS AS NEEDED FOR NAUSEA AND/OR VOMITING, Disp: 60 tablet, Rfl: 2  •  spironolactone (ALDACTONE) 25 mg  tablet, Take 1 tablet (25 mg total) by mouth 2 (two) times a day, Disp: 180 tablet, Rfl: 1  •  sucralfate (CARAFATE) 1 g tablet, TAKE 1 TABLET BY MOUTH FOUR TIMES DAILY, Disp: 120 tablet, Rfl: 5  •  traMADol (ULTRAM) 50 mg tablet, Take 2 tablets (100 mg total) by mouth every 8 (eight) hours as needed for moderate pain, Disp: 180 tablet, Rfl: 5  •  ezetimibe (ZETIA) 10 mg tablet, Take 1 tablet (10 mg total) by mouth daily (Patient not taking: Reported on 3/3/2025), Disp: 30 tablet, Rfl: 5  •  famciclovir (FAMVIR) 500 mg tablet, Take 1 tablet (500 mg total) by mouth 3 (three) times a day for 7 days (Patient not taking: Reported on 2/26/2025), Disp: 21 tablet, Rfl: 0  •  fexofenadine (ALLEGRA) 180 MG tablet, Take 180 mg by mouth daily (Patient not taking: Reported on 2/26/2025), Disp: , Rfl:   •  LORazepam (ATIVAN) 1 mg tablet, Take 1 tablet (1 mg total) by mouth 3 (three) times a day (Patient not taking: Reported on 2/26/2025), Disp: 90 tablet, Rfl: 0  •  topiramate (TOPAMAX) 25 mg tablet, 25 mg p.o. in a.m. and p.m. for 1 week, then increase as tolerated to 25 mg QAM and 50 mg QHS for 1 week and finish at 50 mg in a.m. and p.m. (Patient not taking: Reported on 2/26/2025), Disp: 120 tablet, Rfl: 4  •  triamcinolone (KENALOG) 0.1 % ointment, APPLY TOPICALLY TO AFFECTED AREA(S) TWO TIMES DAILY (Patient not taking: No sig reported), Disp: 15 g, Rfl: 2    Current Facility-Administered Medications:   •  cyanocobalamin injection 1,000 mcg, 1,000 mcg, Intramuscular, Q30 Days, Coy Ayoub DO, 1,000 mcg at 11/16/23 1151        Review of Systems:  Review of Systems   Constitutional:  Negative for activity change, fever and unexpected weight change.   HENT:  Negative for facial swelling, nosebleeds and voice change.    Respiratory:  Negative for chest tightness, shortness of breath and wheezing.    Cardiovascular:  Negative for chest pain, palpitations and leg swelling.   Gastrointestinal:  Negative for abdominal distention.    Genitourinary:  Negative for hematuria.   Musculoskeletal:  Negative for arthralgias.   Skin:  Negative for color change, pallor, rash and wound.   Neurological:  Negative for dizziness, seizures, syncope and headaches.   Psychiatric/Behavioral:  Negative for agitation.          Physical Exam:  Physical Exam  Vitals reviewed.   Constitutional:       Appearance: She is well-developed. She is obese.   HENT:      Head: Normocephalic and atraumatic.   Cardiovascular:      Rate and Rhythm: Normal rate and regular rhythm.      Heart sounds: Normal heart sounds.   Pulmonary:      Effort: Pulmonary effort is normal.      Breath sounds: Normal breath sounds.   Abdominal:      Palpations: Abdomen is soft.   Musculoskeletal:         General: Normal range of motion.      Cervical back: Normal range of motion and neck supple.      Right lower leg: No edema.      Left lower leg: No edema.   Skin:     General: Skin is warm and dry.   Neurological:      Mental Status: She is alert and oriented to person, place, and time.   Psychiatric:         Behavior: Behavior normal.         Thought Content: Thought content normal.         Judgment: Judgment normal.         This note was completed in part utilizing [employer] Direct Software.  Grammatical errors, random word insertions, spelling mistakes, and incomplete sentences can be an occasional consequence of this system secondary to software limitations, ambient noise, and hardware issues.  If you have any questions or concerns about the content, text, or information contained within the body of this dictation, please contact the provider for clarification.

## 2025-03-12 ENCOUNTER — OFFICE VISIT (OUTPATIENT)
Age: 65
End: 2025-03-12
Payer: COMMERCIAL

## 2025-03-12 VITALS — WEIGHT: 208 LBS | BODY MASS INDEX: 35.7 KG/M2 | TEMPERATURE: 97 F

## 2025-03-12 DIAGNOSIS — L25.9 CONTACT DERMATITIS, UNSPECIFIED CONTACT DERMATITIS TYPE, UNSPECIFIED TRIGGER: Primary | ICD-10-CM

## 2025-03-12 DIAGNOSIS — M54.16 LUMBAR RADICULOPATHY: ICD-10-CM

## 2025-03-12 PROCEDURE — 99213 OFFICE O/P EST LOW 20 MIN: CPT | Performed by: REGISTERED NURSE

## 2025-03-12 RX ORDER — TRIAMCINOLONE ACETONIDE 1 MG/G
OINTMENT TOPICAL 2 TIMES DAILY
Qty: 80 G | Refills: 3 | Status: SHIPPED | OUTPATIENT
Start: 2025-03-12

## 2025-03-12 RX ORDER — METHOCARBAMOL 500 MG/1
1000 TABLET, FILM COATED ORAL 3 TIMES DAILY
Qty: 180 TABLET | Refills: 2 | Status: SHIPPED | OUTPATIENT
Start: 2025-03-12

## 2025-03-12 NOTE — PATIENT INSTRUCTIONS
CONTACT DERMATITIS    Assessment and Plan:  Based on a thorough discussion of this condition and the management approach to it (including a comprehensive discussion of the known risks, side effects and potential benefits of treatment), the patient (family) agrees to implement the following specific plan:  START applying triamcinolone 0.1% ointment to the affected area twice daily for up to 2 weeks for flares. For maintenance, you can use triamcinolone 0.1% ointment twice daily 3 times weekly (Mon, Wed, Fri).  Recommend applying over the counter Desitin zinc oxide cream to the area.  Recommend All Free and Clear detergent for sensitive skin.  Recommend Dove Bar Soap for sensitive skin.  Recommend moisturizing the area daily with a moisturizer from brands such as CeraVe or Cetaphil.  For itchiness, recommend taking over the counter Zyrtec 2 times (once in the AM, once in the PM).

## 2025-03-12 NOTE — PROGRESS NOTES
"[facility] Note     Patient Name: Nancy Jones  Encounter Date: 3/12/25     Have you been cared for by a [facility] Dermatologist in the last 3 years and, if so, which description applies to you?    Yes.  I have been here within the last 3 years, and my medical history has NOT changed since that time.  I am FEMALE/of child-bearing potential.    REVIEW OF SYSTEMS:  Have you recently had or currently have any of the following? No changes in my recent health.   PAST MEDICAL HISTORY:  Have you personally ever had or currently have any of the following?  If \"YES,\" then please provide more detail. No changes in my medical history.   HISTORY OF IMMUNOSUPPRESSION: Do you have a history of any of the following:  Systemic Immunosuppression such as Diabetes, Biologic or Immunotherapy, Chemotherapy, Organ Transplantation, Bone Marrow Transplantation or Prednisone?  No     Answering \"YES\" requires the addition of the dotphrase \"IMMUNOSUPPRESSED\" as the first diagnosis of the patient's visit.   FAMILY HISTORY:  Any \"first degree relatives\" (parent, brother, sister, or child) with the following?    No changes in my family's known health.   PATIENT EXPERIENCE:    Do you want the Dermatologist to perform a COMPLETE skin exam today including a clinical examination under the \"bra and underwear\" areas?  NO  If necessary, do we have your permission to call and leave a detailed message on your Preferred Phone number that includes your specific medical information?  Yes      Allergies   Allergen Reactions    Ace Inhibitors Angioedema and Anaphylaxis     Anaphylaxis    Benicar [Olmesartan] Angioedema     See Allergy note from 9/11/2008.  Swollen ankles/legs    Hydralazine Other (See Comments)     Lip swelling  Flank pain    Other Anaphylaxis and Other (See Comments)     Preservatives- itching throat closes, hi  E Z Cat scan contrast - hives throat closes itching,  Preservatives- itching throat closes, hi  Artificial " "sweeteners    Valsartan Angioedema     Lips, face swollen    Ampicillin Hives     Depends on brand some preservatives can react. Has recently tolerated oral amoxicillin.    Sulfa Antibiotics Hives     stuffiness,itching,hives,throat closing--per pt \"sometimes able to take depending on the brand and the filler or possibly preservatives in it\"    Motrin [Ibuprofen] Other (See Comments)     Per pt \" told not to take due to A Fib\"    Wound Dressing Adhesive Other (See Comments)     Per pt Adhesive on EKG leads caused redness      Current Outpatient Medications:     acetaminophen (TYLENOL) 325 mg tablet, Take 2 tablets (650 mg total) by mouth every 6 (six) hours, Disp: , Rfl: 0    apixaban (Eliquis) 5 mg, Take 1 tablet (5 mg total) by mouth 2 (two) times a day, Disp: 180 tablet, Rfl: 3    butalbital-acetaminophen-caffeine (FIORICET,ESGIC) -40 mg per tablet, Take 1 tablet by mouth every 4 (four) hours as needed for headaches, Disp: 20 tablet, Rfl: 0    chlordiazepoxide-clidinium (LIBRAX) 5-2.5 mg per capsule, TAKE 1 CAPSULE BY MOUTH TWO TIMES DAILY AS NEEDED FOR INDIGESTION, Disp: 60 capsule, Rfl: 2    chlorhexidine (PERIDEX) 0.12 % solution, Apply 15 mL to the mouth or throat 2 (two) times a day, Disp: 120 mL, Rfl: 0    dexamethasone (DECADRON) 2 mg tablet, One tab with breakfast (early in day to minimize impact on sleep, with food for stomach protection) for 1-5 days for unrelenting migraine, Disp: 5 tablet, Rfl: 0    Diclofenac Sodium (VOLTAREN) 1 %, Apply 2 g topically 4 (four) times a day, Disp: 100 g, Rfl: 1    diltiazem (CARDIZEM CD) 180 mg 24 hr capsule, Take 1 capsule (180 mg total) by mouth daily, Disp: 90 capsule, Rfl: 3    Docusate Sodium (COLACE PO), Take by mouth daily at bedtime, Disp: , Rfl:     Evolocumab (Repatha SureClick) 140 MG/ML SOAJ, Inject 1 mL (140 mg total) under the skin every 14 (fourteen) days, Disp: 6 mL, Rfl: 5    ezetimibe (ZETIA) 10 mg tablet, Take 1 tablet (10 mg total) by mouth " daily (Patient not taking: Reported on 3/3/2025), Disp: 30 tablet, Rfl: 5    famciclovir (FAMVIR) 500 mg tablet, Take 1 tablet (500 mg total) by mouth 3 (three) times a day for 7 days (Patient not taking: Reported on 2/26/2025), Disp: 21 tablet, Rfl: 0    fexofenadine (ALLEGRA) 180 MG tablet, Take 180 mg by mouth daily (Patient not taking: Reported on 2/26/2025), Disp: , Rfl:     fluticasone-vilanterol (BREO ELLIPTA) 200-25 MCG/INH inhaler, Inhale 1 puff daily Rinse mouth after use., Disp: 3 Inhaler, Rfl: 3    furosemide (LASIX) 40 mg tablet, Take 1 tablet (40 mg total) by mouth daily, Disp: 90 tablet, Rfl: 1    LORazepam (ATIVAN) 1 mg tablet, Take 1 tablet (1 mg total) by mouth 3 (three) times a day (Patient not taking: Reported on 2/26/2025), Disp: 90 tablet, Rfl: 0    LORazepam (ATIVAN) 2 mg tablet, Take 1 tablet (2 mg total) by mouth every 8 (eight) hours as needed for anxiety, Disp: 90 tablet, Rfl: 5    magnesium Oxide (MAG-OX) 400 mg TABS, Take 1 tablet (400 mg total) by mouth daily at bedtime, Disp: 90 tablet, Rfl: 3    methocarbamol (ROBAXIN) 500 mg tablet, TAKE TWO TABLETS BY MOUTH THREE TIMES A DAY, Disp: 180 tablet, Rfl: 2    metoprolol succinate (TOPROL-XL) 100 mg 24 hr tablet, Take 1 tablet (100 mg total) by mouth 2 (two) times a day, Disp: 180 tablet, Rfl: 1    omeprazole (PriLOSEC) 40 MG capsule, Take 1 capsule (40 mg total) by mouth daily, Disp: 90 capsule, Rfl: 1    ondansetron (ZOFRAN-ODT) 4 mg disintegrating tablet, Take 1 tablet (4 mg total) by mouth every 8 (eight) hours as needed for nausea, Disp: 21 tablet, Rfl: 5    phenazopyridine (PYRIDIUM) 200 mg tablet, Take 1 tablet (200 mg total) by mouth 3 (three) times a day with meals, Disp: 10 tablet, Rfl: 0    phenazopyridine (PYRIDIUM) 200 mg tablet, Take 1 tablet (200 mg total) by mouth 3 (three) times a day with meals, Disp: 90 tablet, Rfl: 5    polyethylene glycol (MIRALAX) 17 g packet, Take 17 g by mouth daily as needed (constipation), Disp: ,  Rfl:     promethazine (PHENERGAN) 25 mg tablet, TAKE 1 TABLET BY MOUTH EVERY 6 HOURS AS NEEDED FOR NAUSEA AND/OR VOMITING, Disp: 60 tablet, Rfl: 2    spironolactone (ALDACTONE) 25 mg tablet, Take 1 tablet (25 mg total) by mouth 2 (two) times a day, Disp: 180 tablet, Rfl: 1    sucralfate (CARAFATE) 1 g tablet, TAKE 1 TABLET BY MOUTH FOUR TIMES DAILY, Disp: 120 tablet, Rfl: 5    topiramate (TOPAMAX) 25 mg tablet, 25 mg p.o. in a.m. and p.m. for 1 week, then increase as tolerated to 25 mg QAM and 50 mg QHS for 1 week and finish at 50 mg in a.m. and p.m. (Patient not taking: Reported on 2/26/2025), Disp: 120 tablet, Rfl: 4    traMADol (ULTRAM) 50 mg tablet, Take 2 tablets (100 mg total) by mouth every 8 (eight) hours as needed for moderate pain, Disp: 180 tablet, Rfl: 5    triamcinolone (KENALOG) 0.1 % ointment, APPLY TOPICALLY TO AFFECTED AREA(S) TWO TIMES DAILY (Patient not taking: No sig reported), Disp: 15 g, Rfl: 2    Current Facility-Administered Medications:     cyanocobalamin injection 1,000 mcg, 1,000 mcg, Intramuscular, Q30 Days, Coy Ayoub DO, 1,000 mcg at 11/16/23 1151        Whom besides the patient is providing clinical information about today's encounter?   NO ADDITIONAL HISTORIAN (patient alone provided history)    Physical Exam and Assessment/Plan by Diagnosis:    CONTACT DERMATITIS    Physical Exam:  Anatomic Location Affected:  buttocks  Morphological Description:  erythematous excoriated plaques.   Pertinent Positives:  Pertinent Negatives:    Additional History of Present Condition:  Patient presents today for a rash on the buttocks. It has been going on for about a week. She has been using triamcinolone 0.5% cream over the past 3 days. She reports itchiness and some bleeding of the area. She notes that she has interstitial cystitis and wears pads sometimes; she thinks that her pad may have irritated the skin.    Assessment and Plan:  Based on a thorough discussion of this condition and the  management approach to it (including a comprehensive discussion of the known risks, side effects and potential benefits of treatment), the patient (family) agrees to implement the following specific plan:  START applying triamcinolone 0.1% ointment to the affected area twice daily for up to 2 weeks for flares. Do not use for more than 2 weeks.   Recommend applying over the counter Desitin zinc oxide ointment to the area.  Recommend All Free and Clear detergent for sensitive skin.  Recommend Dove Bar Soap for sensitive skin.  Recommend moisturizing the area daily with a moisturizer from brands such as CeraVe or Cetaphil.  For itchiness, recommend taking over the counter Zyrtec 2 times (once in the AM, once in the PM).      Scribe Attestation      I,:  Janiya Luther MA am acting as a scribe while in the presence of the attending physician.:       I,:  Santy Saha MD personally performed the services described in this documentation    as scribed in my presence.:

## 2025-03-14 DIAGNOSIS — G43.711 INTRACTABLE CHRONIC MIGRAINE WITHOUT AURA AND WITH STATUS MIGRAINOSUS: ICD-10-CM

## 2025-03-17 ENCOUNTER — TELEPHONE (OUTPATIENT)
Dept: CARDIOLOGY CLINIC | Facility: CLINIC | Age: 65
End: 2025-03-17

## 2025-03-17 ENCOUNTER — TELEPHONE (OUTPATIENT)
Age: 65
End: 2025-03-17

## 2025-03-17 RX ORDER — DEXAMETHASONE 2 MG/1
TABLET ORAL
Qty: 5 TABLET | Refills: 0 | Status: SHIPPED | OUTPATIENT
Start: 2025-03-17

## 2025-03-17 NOTE — TELEPHONE ENCOUNTER
Left message for patient to call back to verify the reason she needs referral to Oral Surgery. So we can place referral to [facility] for patient

## 2025-03-17 NOTE — TELEPHONE ENCOUNTER
Reason for call:   [x] Prior Auth  [] Other:     Caller: Insurance Company   [] Patient  [] Pharmacy  Name:   Address:   Callback Number:     Medication: Repatha SureClick     Dose/Frequency: 140 mg/mL SOAJ. Inject 1 mL under the skin every 14 days     Quantity: 6 mL     Ordering Provider:   [] PCP/Provider -   [x] Speciality/Provider - Cardiology

## 2025-03-18 ENCOUNTER — PATIENT MESSAGE (OUTPATIENT)
Age: 65
End: 2025-03-18

## 2025-03-18 NOTE — TELEPHONE ENCOUNTER
Patient calls regarding a cracked root canal. Patient needs extraction from the oral surgeon. I was able to report to the patient the PCP recommendation.   Coy Ayoub, DO to [facility]       3/17/25  3:22 PM  [facility]    Patient is asking if there is a particular doctor that she should see? Please call the patient at [phone].

## 2025-03-18 NOTE — TELEPHONE ENCOUNTER
Do you have a particular Doctor for her to see or it does not matter? Pt is asking for her advise. Thank you.

## 2025-03-18 NOTE — TELEPHONE ENCOUNTER
Caller: Thor from [employer]    Doctor: Dr. Lynn    Reason for call: Thor from [employer] calling to notify that the [facility] prior authorization has been approved. Please call [employer] if you have any questions.    Call back#: [phone]

## 2025-03-18 NOTE — TELEPHONE ENCOUNTER
PA for repatha 140 mg/ml SUBMITTED to [employer]    via    [x]CMM-KEY: [id number]   []Surescripts-Case ID #   []Availity-Auth ID # NDC #   []Faxed to plan   []Other website   []Phone call Case ID #     []PA sent as URGENT    All office notes, labs and other pertaining documents and studies sent. Clinical questions answered. Awaiting determination from insurance company.     Turnaround time for your insurance to make a decision on your Prior Authorization can take 7-21 business days.

## 2025-03-18 NOTE — TELEPHONE ENCOUNTER
Received call from patient requesting an update on call she made this afternoon asking if pcp provider had any recommendations on a specific provider at [facility]  Advised provider has yet to address her questions   Advised to allow provider to give his recommendations and she should expect a call back

## 2025-03-19 NOTE — TELEPHONE ENCOUNTER
PA for REPATHA 140 MG/ML  APPROVED     Date(s) approved 3/18/25-3/18/26    Case #    Patient advised by          [][employer] Message  [x]Phone call   []LMOM  []L/M to call office as no active Communication consent on file  []Unable to leave detailed message as VM not approved on Communication consent       Pharmacy advised by    []Fax  []Phone call  []Secure Chat    Specialty Pharmacy    []     Approval letter scanned into Media Yes

## 2025-03-20 ENCOUNTER — TELEPHONE (OUTPATIENT)
Age: 65
End: 2025-03-20

## 2025-03-20 ENCOUNTER — NURSE TRIAGE (OUTPATIENT)
Age: 65
End: 2025-03-20

## 2025-03-20 NOTE — TELEPHONE ENCOUNTER
"FOLLOW UP: Adina called stating BP's have been elevated recently associated with numbness, tingling on the left side of her face beginning from the neck, traveling to the chin and up the left side of the head for the past few weeks.    REASON FOR CONVERSATION: Facial Numbness    SYMPTOMS: numbness and tingling    OTHER: 156/73, 70  169/93, 73  173/86, 68  171/96, 63    DISPOSITION: pt advised to report to ED    Answer Assessment - Initial Assessment Questions  1. SYMPTOM: \"What is the main symptom you are concerned about?\" (e.g., weakness, numbness)      Numbness, tingling on left side of face  2. ONSET: \"When did this start?\" (minutes, hours, days; while sleeping)      Few weeks ago  3. LAST NORMAL: \"When was the last time you (the patient) were normal (no symptoms)?\"      unsure  4. PATTERN \"Does this come and go, or has it been constant since it started?\"  \"Is it present now?\"      Comes and goes  5. CARDIAC SYMPTOMS: \"Have you had any of the following symptoms: chest pain, difficulty breathing, palpitations?\"      Denies, except HTN  6. NEUROLOGIC SYMPTOMS: \"Have you had any of the following symptoms: headache, dizziness, vision loss, double vision, changes in speech, unsteady on your feet?\"      denies  7. OTHER SYMPTOMS: \"Do you have any other symptoms?\"      HTN    Protocols used: Neurologic Deficit-Adult-[place]    "

## 2025-03-20 NOTE — TELEPHONE ENCOUNTER
Patients GI provider:  Dr. Bernal    Number to return call: [phone]    Reason for call: Pt calling to speak to Dr Bernal. Pt is asking for Dr Bernal to call her to discuss her previous colon surgery prior to the 4/7/25 colonoscopy. Pt said she is feeling very anxious about it and would like to hear from him personally that he knows of her situation in the hopes it puts her mind at ease. Please reach back out to the pt    Scheduled procedure/appointment date if applicable: /procedure 4/7/25

## 2025-03-23 DIAGNOSIS — K21.9 GASTROESOPHAGEAL REFLUX DISEASE WITHOUT ESOPHAGITIS: ICD-10-CM

## 2025-03-25 RX ORDER — SUCRALFATE 1 G/1
1 TABLET ORAL 4 TIMES DAILY
Qty: 120 TABLET | Refills: 5 | Status: SHIPPED | OUTPATIENT
Start: 2025-03-25

## 2025-03-28 ENCOUNTER — OFFICE VISIT (OUTPATIENT)
Age: 65
End: 2025-03-28
Payer: COMMERCIAL

## 2025-03-28 VITALS
HEIGHT: 64 IN | SYSTOLIC BLOOD PRESSURE: 132 MMHG | TEMPERATURE: 98.5 F | BODY MASS INDEX: 35.7 KG/M2 | HEART RATE: 76 BPM | OXYGEN SATURATION: 95 % | DIASTOLIC BLOOD PRESSURE: 80 MMHG

## 2025-03-28 VITALS — TEMPERATURE: 95 F | BODY MASS INDEX: 35.7 KG/M2 | WEIGHT: 208 LBS

## 2025-03-28 DIAGNOSIS — R06.2 WHEEZING: ICD-10-CM

## 2025-03-28 DIAGNOSIS — N30.01 ACUTE CYSTITIS WITH HEMATURIA: Primary | ICD-10-CM

## 2025-03-28 DIAGNOSIS — J02.0 PHARYNGITIS DUE TO STREPTOCOCCUS SPECIES: ICD-10-CM

## 2025-03-28 DIAGNOSIS — J06.9 ACUTE UPPER RESPIRATORY INFECTION: ICD-10-CM

## 2025-03-28 DIAGNOSIS — I48.0 PAROXYSMAL ATRIAL FIBRILLATION (HCC): ICD-10-CM

## 2025-03-28 DIAGNOSIS — L82.1 SEBORRHEIC KERATOSES: Primary | ICD-10-CM

## 2025-03-28 PROBLEM — N30.00 ACUTE CYSTITIS: Status: ACTIVE | Noted: 2025-03-28

## 2025-03-28 LAB
BACTERIA UR QL AUTO: ABNORMAL /HPF
BILIRUB UR QL STRIP: ABNORMAL
CLARITY UR: ABNORMAL
COLOR UR: ABNORMAL
GLUCOSE UR STRIP-MCNC: NEGATIVE MG/DL
HGB UR QL STRIP.AUTO: NEGATIVE
KETONES UR STRIP-MCNC: NEGATIVE MG/DL
LEUKOCYTE ESTERASE UR QL STRIP: ABNORMAL
NITRITE UR QL STRIP: POSITIVE
NON-SQ EPI CELLS URNS QL MICRO: ABNORMAL /HPF
PH UR STRIP.AUTO: 6 [PH]
PROT UR STRIP-MCNC: ABNORMAL MG/DL
RBC #/AREA URNS AUTO: ABNORMAL /HPF
SP GR UR STRIP.AUTO: 1.02 (ref 1–1.03)
UROBILINOGEN UR STRIP-ACNC: 3 MG/DL
WBC #/AREA URNS AUTO: ABNORMAL /HPF

## 2025-03-28 PROCEDURE — 99212 OFFICE O/P EST SF 10 MIN: CPT | Performed by: REGISTERED NURSE

## 2025-03-28 PROCEDURE — 96372 THER/PROPH/DIAG INJ SC/IM: CPT | Performed by: FAMILY MEDICINE

## 2025-03-28 PROCEDURE — G2211 COMPLEX E/M VISIT ADD ON: HCPCS | Performed by: FAMILY MEDICINE

## 2025-03-28 PROCEDURE — 87086 URINE CULTURE/COLONY COUNT: CPT | Performed by: FAMILY MEDICINE

## 2025-03-28 PROCEDURE — 87077 CULTURE AEROBIC IDENTIFY: CPT | Performed by: FAMILY MEDICINE

## 2025-03-28 PROCEDURE — 87186 SC STD MICRODIL/AGAR DIL: CPT | Performed by: FAMILY MEDICINE

## 2025-03-28 PROCEDURE — 81001 URINALYSIS AUTO W/SCOPE: CPT | Performed by: FAMILY MEDICINE

## 2025-03-28 PROCEDURE — 99214 OFFICE O/P EST MOD 30 MIN: CPT | Performed by: FAMILY MEDICINE

## 2025-03-28 RX ORDER — FLUTICASONE FUROATE AND VILANTEROL 200; 25 UG/1; UG/1
1 POWDER RESPIRATORY (INHALATION) DAILY
Qty: 200 BLISTER | Refills: 1 | Status: SHIPPED | OUTPATIENT
Start: 2025-03-28

## 2025-03-28 RX ORDER — CIPROFLOXACIN 500 MG/1
500 TABLET, FILM COATED ORAL EVERY 12 HOURS SCHEDULED
Qty: 14 TABLET | Refills: 0 | Status: SHIPPED | OUTPATIENT
Start: 2025-03-28 | End: 2025-04-04

## 2025-03-28 RX ORDER — DILTIAZEM HYDROCHLORIDE 240 MG/1
240 CAPSULE, COATED, EXTENDED RELEASE ORAL DAILY
Qty: 100 CAPSULE | Refills: 0 | Status: SHIPPED | OUTPATIENT
Start: 2025-03-28

## 2025-03-28 RX ADMIN — CYANOCOBALAMIN 1000 MCG: 1000 INJECTION, SOLUTION INTRAMUSCULAR; SUBCUTANEOUS at 13:25

## 2025-03-28 NOTE — ASSESSMENT & PLAN NOTE
Increase Cardizem 240 mg daily.    Orders:    diltiazem (CARDIZEM CD) 240 mg 24 hr capsule; Take 1 capsule (240 mg total) by mouth daily

## 2025-03-28 NOTE — ASSESSMENT & PLAN NOTE
Guidance given.  Patient will start Cipro.    Orders:    ciprofloxacin (CIPRO) 500 mg tablet; Take 1 tablet (500 mg total) by mouth every 12 (twelve) hours for 7 days

## 2025-03-28 NOTE — ASSESSMENT & PLAN NOTE
Check urinalysis and urine culture and start Cipro.  Increase fluid intake.    Orders:    ciprofloxacin (CIPRO) 500 mg tablet; Take 1 tablet (500 mg total) by mouth every 12 (twelve) hours for 7 days

## 2025-03-28 NOTE — PROGRESS NOTES
Name: Nancy Jones      : 1960      MRN: [id number]  Encounter Provider: Barber Ayoub DO  Encounter Date: 3/28/2025   Encounter department: [facility] PRIMARY [facility]  :  Assessment & Plan  Acute cystitis with hematuria  Check urinalysis and urine culture and start Cipro.  Increase fluid intake.    Orders:    ciprofloxacin (CIPRO) 500 mg tablet; Take 1 tablet (500 mg total) by mouth every 12 (twelve) hours for 7 days    Pharyngitis due to Streptococcus species  Guidance given.  Patient will start Cipro.    Orders:    ciprofloxacin (CIPRO) 500 mg tablet; Take 1 tablet (500 mg total) by mouth every 12 (twelve) hours for 7 days    Wheezing  Continue Breo.  Refills given    Orders:    fluticasone-vilanterol (Breo Ellipta) 200-25 mcg/actuation inhaler; Inhale 1 puff daily Rinse mouth after use.    Acute upper respiratory infection    Orders:    fluticasone-vilanterol (Breo Ellipta) 200-25 mcg/actuation inhaler; Inhale 1 puff daily Rinse mouth after use.    Paroxysmal atrial fibrillation (HCC)  Increase Cardizem 240 mg daily.    Orders:    diltiazem (CARDIZEM CD) 240 mg 24 hr capsule; Take 1 capsule (240 mg total) by mouth daily           History of Present Illness   Patient is here with sore throat over the past 3 days.  Patient also with discolored urine.  Patient with dysuria and frequency.  No vomiting but patient does have nausea.  Patient with some suprapubic pain associated with this.  Patient with some low back pain also.  Patient with nasal congestion and postnasal drip and cough.  Patient did use Pyridium.  Patient to use Mucinex DM    Sore Throat   Associated symptoms include congestion and coughing.   Urinary Tract Infection   Associated symptoms include frequency.   Hypertension      Review of Systems   Constitutional:  Negative for fever.   HENT:  Positive for congestion, postnasal drip and sore throat.    Respiratory:  Positive for cough.    Genitourinary:  Positive for dysuria and  "frequency.       Objective   /80 (BP Location: Left arm, Patient Position: Sitting, Cuff Size: Large)   Pulse 76   Temp 98.5 °F (36.9 °C) (Temporal)   Ht 5' 4\" (1.626 m)   LMP  (LMP Unknown)   SpO2 95%   BMI 35.70 kg/m²      Physical Exam  Vitals and nursing note reviewed.   Constitutional:       General: She is not in acute distress.     Appearance: Normal appearance. She is well-developed. She is not ill-appearing, toxic-appearing or diaphoretic.   HENT:      Head: Normocephalic and atraumatic.      Right Ear: Tympanic membrane, ear canal and external ear normal.      Left Ear: Tympanic membrane, ear canal and external ear normal.      Nose: Nose normal.      Mouth/Throat:      Mouth: Mucous membranes are moist.      Pharynx: Oropharyngeal exudate and posterior oropharyngeal erythema present.   Eyes:      General:         Right eye: No discharge.         Left eye: No discharge.   Neck:      Thyroid: No thyromegaly.      Vascular: No carotid bruit.      Trachea: No tracheal deviation.   Cardiovascular:      Rate and Rhythm: Normal rate and regular rhythm.      Pulses: Normal pulses.      Heart sounds: Normal heart sounds. No murmur heard.     No gallop.   Pulmonary:      Effort: Pulmonary effort is normal. No respiratory distress.      Breath sounds: Normal breath sounds. No stridor. No wheezing or rales.   Chest:      Chest wall: No tenderness.   Abdominal:      General: Bowel sounds are normal. There is no distension.      Palpations: Abdomen is soft.      Tenderness: There is abdominal tenderness. There is left CVA tenderness. There is no right CVA tenderness, guarding or rebound.   Musculoskeletal:         General: No tenderness or deformity. Normal range of motion.      Cervical back: Normal range of motion and neck supple.   Lymphadenopathy:      Cervical: No cervical adenopathy.   Skin:     General: Skin is warm and dry.      Capillary Refill: Capillary refill takes less than 2 seconds.      " Coloration: Skin is not pale.      Findings: No erythema or rash.   Neurological:      Mental Status: She is alert and oriented to person, place, and time. Mental status is at baseline.      Cranial Nerves: No cranial nerve deficit.      Motor: No abnormal muscle tone.      Coordination: Coordination normal.      Deep Tendon Reflexes: Reflexes normal.   Psychiatric:         Behavior: Behavior normal.         Thought Content: Thought content normal.         Judgment: Judgment normal.

## 2025-03-28 NOTE — ASSESSMENT & PLAN NOTE
Continue Breo.  Refills given    Orders:    fluticasone-vilanterol (Breo Ellipta) 200-25 mcg/actuation inhaler; Inhale 1 puff daily Rinse mouth after use.

## 2025-03-28 NOTE — PROGRESS NOTES
"[facility]atology [facility] Note     Patient Name: Nancy Jones  Encounter Date: 3/28/25     Have you been cared for by a [facility] Dermatologist in the last 3 years and, if so, which description applies to you?    Yes.  I have been here within the last 3 years, and my medical history has NOT changed since that time.  I am FEMALE/of child-bearing potential.    REVIEW OF SYSTEMS:  Have you recently had or currently have any of the following? No changes in my recent health.   PAST MEDICAL HISTORY:  Have you personally ever had or currently have any of the following?  If \"YES,\" then please provide more detail. No changes in my medical history.   HISTORY OF IMMUNOSUPPRESSION: Do you have a history of any of the following:  Systemic Immunosuppression such as Diabetes, Biologic or Immunotherapy, Chemotherapy, Organ Transplantation, Bone Marrow Transplantation or Prednisone?  No     Answering \"YES\" requires the addition of the dotphrase \"IMMUNOSUPPRESSED\" as the first diagnosis of the patient's visit.   FAMILY HISTORY:  Any \"first degree relatives\" (parent, brother, sister, or child) with the following?    No changes in my family's known health.   PATIENT EXPERIENCE:    Do you want the Dermatologist to perform a COMPLETE skin exam today including a clinical examination under the \"bra and underwear\" areas?  NO  If necessary, do we have your permission to call and leave a detailed message on your Preferred Phone number that includes your specific medical information?  Yes      Allergies   Allergen Reactions    Ace Inhibitors Angioedema and Anaphylaxis     Anaphylaxis    Benicar [Olmesartan] Angioedema     See Allergy note from 9/11/2008.  Swollen ankles/legs    Hydralazine Other (See Comments)     Lip swelling  Flank pain    Other Anaphylaxis and Other (See Comments)     Preservatives- itching throat closes, hi  E Z Cat scan contrast - hives throat closes itching,  Preservatives- itching throat closes, hi  Artificial " "sweeteners    Valsartan Angioedema     Lips, face swollen    Ampicillin Hives     Depends on brand some preservatives can react. Has recently tolerated oral amoxicillin.    Sulfa Antibiotics Hives     stuffiness,itching,hives,throat closing--per pt \"sometimes able to take depending on the brand and the filler or possibly preservatives in it\"    Motrin [Ibuprofen] Other (See Comments)     Per pt \" told not to take due to A Fib\"    Wound Dressing Adhesive Other (See Comments)     Per pt Adhesive on EKG leads caused redness      Current Outpatient Medications:     acetaminophen (TYLENOL) 325 mg tablet, Take 2 tablets (650 mg total) by mouth every 6 (six) hours, Disp: , Rfl: 0    apixaban (Eliquis) 5 mg, Take 1 tablet (5 mg total) by mouth 2 (two) times a day, Disp: 180 tablet, Rfl: 3    butalbital-acetaminophen-caffeine (FIORICET,ESGIC) -40 mg per tablet, Take 1 tablet by mouth every 4 (four) hours as needed for headaches, Disp: 20 tablet, Rfl: 0    chlordiazepoxide-clidinium (LIBRAX) 5-2.5 mg per capsule, TAKE 1 CAPSULE BY MOUTH TWO TIMES DAILY AS NEEDED FOR INDIGESTION, Disp: 60 capsule, Rfl: 2    chlorhexidine (PERIDEX) 0.12 % solution, Apply 15 mL to the mouth or throat 2 (two) times a day, Disp: 120 mL, Rfl: 0    ciprofloxacin (CIPRO) 500 mg tablet, Take 1 tablet (500 mg total) by mouth every 12 (twelve) hours for 7 days, Disp: 14 tablet, Rfl: 0    dexamethasone (DECADRON) 2 mg tablet, One tab with breakfast (early in day to minimize impact on sleep, with food for stomach protection) for 1-5 days for unrelenting migraine, Disp: 5 tablet, Rfl: 0    Diclofenac Sodium (VOLTAREN) 1 %, Apply 2 g topically 4 (four) times a day, Disp: 100 g, Rfl: 1    diltiazem (CARDIZEM CD) 240 mg 24 hr capsule, Take 1 capsule (240 mg total) by mouth daily, Disp: 100 capsule, Rfl: 0    Docusate Sodium (COLACE PO), Take by mouth daily at bedtime, Disp: , Rfl:     Evolocumab (Repatha SureClick) 140 MG/ML SOAJ, Inject 1 mL (140 mg " Home total) under the skin every 14 (fourteen) days, Disp: 6 mL, Rfl: 5    ezetimibe (ZETIA) 10 mg tablet, Take 1 tablet (10 mg total) by mouth daily (Patient not taking: Reported on 3/3/2025), Disp: 30 tablet, Rfl: 5    famciclovir (FAMVIR) 500 mg tablet, Take 1 tablet (500 mg total) by mouth 3 (three) times a day for 7 days (Patient not taking: Reported on 2/26/2025), Disp: 21 tablet, Rfl: 0    fexofenadine (ALLEGRA) 180 MG tablet, Take 180 mg by mouth daily (Patient not taking: Reported on 2/26/2025), Disp: , Rfl:     fluticasone-vilanterol (Breo Ellipta) 200-25 mcg/actuation inhaler, Inhale 1 puff daily Rinse mouth after use., Disp: 200 blister, Rfl: 1    furosemide (LASIX) 40 mg tablet, Take 1 tablet (40 mg total) by mouth daily, Disp: 90 tablet, Rfl: 1    LORazepam (ATIVAN) 1 mg tablet, Take 1 tablet (1 mg total) by mouth 3 (three) times a day (Patient not taking: Reported on 3/28/2025), Disp: 90 tablet, Rfl: 0    LORazepam (ATIVAN) 2 mg tablet, Take 1 tablet (2 mg total) by mouth every 8 (eight) hours as needed for anxiety, Disp: 90 tablet, Rfl: 5    magnesium Oxide (MAG-OX) 400 mg TABS, Take 1 tablet (400 mg total) by mouth daily at bedtime (Patient not taking: Reported on 3/28/2025), Disp: 90 tablet, Rfl: 3    methocarbamol (ROBAXIN) 500 mg tablet, TAKE TWO TABLETS BY MOUTH THREE TIMES A DAY, Disp: 180 tablet, Rfl: 2    metoprolol succinate (TOPROL-XL) 100 mg 24 hr tablet, Take 1 tablet (100 mg total) by mouth 2 (two) times a day, Disp: 180 tablet, Rfl: 1    omeprazole (PriLOSEC) 40 MG capsule, Take 1 capsule (40 mg total) by mouth daily, Disp: 90 capsule, Rfl: 1    ondansetron (ZOFRAN-ODT) 4 mg disintegrating tablet, Take 1 tablet (4 mg total) by mouth every 8 (eight) hours as needed for nausea, Disp: 21 tablet, Rfl: 5    phenazopyridine (PYRIDIUM) 200 mg tablet, Take 1 tablet (200 mg total) by mouth 3 (three) times a day with meals, Disp: 10 tablet, Rfl: 0    phenazopyridine (PYRIDIUM) 200 mg tablet, Take 1  "tablet (200 mg total) by mouth 3 (three) times a day with meals (Patient not taking: Reported on 3/28/2025), Disp: 90 tablet, Rfl: 5    polyethylene glycol (MIRALAX) 17 g packet, Take 17 g by mouth daily as needed (constipation), Disp: , Rfl:     promethazine (PHENERGAN) 25 mg tablet, TAKE 1 TABLET BY MOUTH EVERY 6 HOURS AS NEEDED FOR NAUSEA AND/OR VOMITING, Disp: 60 tablet, Rfl: 2    spironolactone (ALDACTONE) 25 mg tablet, Take 1 tablet (25 mg total) by mouth 2 (two) times a day, Disp: 180 tablet, Rfl: 1    sucralfate (CARAFATE) 1 g tablet, Take 1 tablet (1 g total) by mouth 4 (four) times a day, Disp: 120 tablet, Rfl: 5    topiramate (TOPAMAX) 25 mg tablet, 25 mg p.o. in a.m. and p.m. for 1 week, then increase as tolerated to 25 mg QAM and 50 mg QHS for 1 week and finish at 50 mg in a.m. and p.m. (Patient not taking: Reported on 2/26/2025), Disp: 120 tablet, Rfl: 4    traMADol (ULTRAM) 50 mg tablet, Take 2 tablets (100 mg total) by mouth every 8 (eight) hours as needed for moderate pain, Disp: 180 tablet, Rfl: 5    triamcinolone (KENALOG) 0.1 % ointment, Apply topically 2 (two) times a day START applying triamcinolone 0.1% ointment to the affected area twice daily for up to 2 weeks for flares. Do not use for more than 2 weeks., Disp: 80 g, Rfl: 3    Current Facility-Administered Medications:     cyanocobalamin injection 1,000 mcg, 1,000 mcg, Intramuscular, Q30 Days, Coy Ayoub DO, 1,000 mcg at 03/28/25 1325        Whom besides the patient is providing clinical information about today's encounter?   NO ADDITIONAL HISTORIAN (patient alone provided history)    Physical Exam and Assessment/Plan by Diagnosis:       SEBORRHEIC KERATOSIS; NON-INFLAMED    Physical Exam:  Anatomic Location Affected:  behind ears, back  Morphological Description:  Waxy, smooth to warty textured, yellow to brownish-grey to dark brown to blackish, discrete, \"stuck-on\" appearing papules.  Present for years. Denies pain, itch, bleeding.    "   Additional History of Present Condition:  Present constantly; no modifying factors which make it worse or better. No prior treatment. Patient reports that these are itchy at times.     Assessment and Plan:  Based on a thorough discussion of this condition and the management approach to it (including a comprehensive discussion of the known risks, side effects and potential benefits of treatment), the patient (family) agrees to implement the following specific plan:  Reassure benign  Use sun protection.  Apply SPF 30 or higher at least three times a day.  Wear sun protecting clothing and hats.  Recommend moisturizing the affected areas twice daily with moisturizers from brand such as CeraVe, Cetapil, or Eucerin.       Scribe Attestation      I,:  Janiya Luther MA am acting as a scribe while in the presence of the attending physician.:       I,:  Santy Saha MD personally performed the services described in this documentation    as scribed in my presence.:

## 2025-03-30 ENCOUNTER — RESULTS FOLLOW-UP (OUTPATIENT)
Age: 65
End: 2025-03-30

## 2025-03-30 DIAGNOSIS — R30.0 DYSURIA: Primary | ICD-10-CM

## 2025-03-30 LAB — BACTERIA UR CULT: ABNORMAL

## 2025-03-31 RX ORDER — NITROFURANTOIN 25; 75 MG/1; MG/1
100 CAPSULE ORAL 2 TIMES DAILY
Qty: 14 CAPSULE | Refills: 0 | Status: SHIPPED | OUTPATIENT
Start: 2025-03-31 | End: 2025-04-07

## 2025-03-31 NOTE — RESULT ENCOUNTER NOTE
Spoke with pt, informed her of the findings and also Dr. Ayoub recommendation. PT will stop Cipro and will go  her new med.

## 2025-04-03 ENCOUNTER — TELEPHONE (OUTPATIENT)
Dept: GASTROENTEROLOGY | Facility: MEDICAL CENTER | Age: 65
End: 2025-04-03

## 2025-04-03 NOTE — TELEPHONE ENCOUNTER
Patients GI provider:       Number to return call: [phone]    Reason for call:  FYI Pt called insurance in now [employer]     Scheduled procedure/appointment date if applicable: 4/7/2025

## 2025-04-04 ENCOUNTER — TELEPHONE (OUTPATIENT)
Age: 65
End: 2025-04-04

## 2025-04-04 ENCOUNTER — OFFICE VISIT (OUTPATIENT)
Dept: VASCULAR SURGERY | Facility: CLINIC | Age: 65
End: 2025-04-04
Payer: COMMERCIAL

## 2025-04-04 VITALS
WEIGHT: 208 LBS | DIASTOLIC BLOOD PRESSURE: 80 MMHG | HEART RATE: 62 BPM | TEMPERATURE: 98.1 F | BODY MASS INDEX: 35.51 KG/M2 | HEIGHT: 64 IN | SYSTOLIC BLOOD PRESSURE: 158 MMHG | OXYGEN SATURATION: 94 %

## 2025-04-04 DIAGNOSIS — Z95.828 INTERNAL CAROTID ARTERY STENT PRESENT: Primary | ICD-10-CM

## 2025-04-04 DIAGNOSIS — I65.22 CAROTID STENOSIS, ASYMPTOMATIC, LEFT: ICD-10-CM

## 2025-04-04 PROCEDURE — 99214 OFFICE O/P EST MOD 30 MIN: CPT | Performed by: SURGERY

## 2025-04-04 RX ORDER — SODIUM CHLORIDE 9 MG/ML
125 INJECTION, SOLUTION INTRAVENOUS CONTINUOUS
Status: CANCELLED | OUTPATIENT
Start: 2025-04-04

## 2025-04-04 RX ORDER — ONDANSETRON 2 MG/ML
4 INJECTION INTRAMUSCULAR; INTRAVENOUS ONCE AS NEEDED
Status: CANCELLED | OUTPATIENT
Start: 2025-04-04

## 2025-04-04 NOTE — TELEPHONE ENCOUNTER
Patient called to confirm when she should start holding Eliquis for her colonoscopy. Dr. Lynn cleared her to hold 2 days prior.  Colonoscopy scheduled for Monday.  Advised pt she would start holding tomorrow.

## 2025-04-04 NOTE — PROGRESS NOTES
"Name: Nancy Jones      : 1960      MRN: [id number]  Encounter Provider: Roberto García DO  Encounter Date: 2025   Encounter department: [facility] [place]  :  Assessment & Plan  Internal carotid artery stent present  History of left TCAR.  Surveillance carotid duplex demonstrates a widely patent stent.  No significant contralateral extracranial carotid artery occlusive disease.  Continue medical regimen.  Continue surveillance with periodic carotid duplex.  Orders:    VAS carotid complete study; Future    Carotid stenosis, asymptomatic, left    Orders:    VAS carotid complete study; Future        History of Present Illness   HPI  Nancy Jones is a 65 y.o. female who presents to the office to review surveillance duplex.  She has history of attempted left carotid endarterectomy which was ultimately aborted due to inability to safely access distal ICA.  Patient was subsequently brought back and underwent a successful left TCAR.      Patient is here for a 6 month follow-up, CV 25. Patient fell 24 and 25 down steps. Patient hit head. Patient complains of  headaches, chest palpitation, racing heart and eye change. Patient has anxiety and stated pain travels to her head. Patient stated when she presses on her neck she fells pain.    History obtained from: patient    Review of Systems   Musculoskeletal:  Positive for neck pain.   Neurological:  Positive for headaches.     Past Medical History   Past Medical History:   Diagnosis Date    A-fib ([facility] [place]) 2020    Allergic     Anxiety     Arthritis     Asthma     Back pain     and muscle pain    Bruises easily     Chronic pain disorder     Interstitial pain    Colon polyp     Dental bridge present     Depression     Eczema     GERD (gastroesophageal reflux disease)     Heart murmur     History of blood clots     per pt \"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\"    History of pneumonia     Hives     Hyperlipidemia     " "Hypertension     Infertility, female     Interstitial cystitis     Migraine     sees neurologist    Motion sickness     Obesity     Palpitations     PONV (postoperative nausea and vomiting)     Premature atrial contractions 11/09/2022    Psychiatric disorder     TIA (transient ischemic attack) 09/2022    per pt --\"had MRI and 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\"    Urinary incontinence     wears Pads    Venous insufficiency 08/01/2017    Wears glasses      Past Surgical History:   Procedure Laterality Date    COLONOSCOPY      DILATION AND CURETTAGE OF UTERUS      EGD      EXPLORATORY LAPAROTOMY      for infertility    EXPLORATORY LAPAROTOMY W/ BOWEL RESECTION N/A 7/19/2023    Procedure: LAPAROTOMY EXPLORATORY W/ BOWEL RESECTION;  Surgeon: Crista Recinos MD;  [facility];  Service: General    HAND SURGERY      Hand excision of tendon cyst    LA LAPAROSCOPIC APPENDECTOMY N/A 05/03/2022    Procedure: APPENDECTOMY LAPAROSCOPIC;  Surgeon: Vin Fields MD;  [facility]: [facility];  Service: General    LA LAPS ABD PRTM&OMENTUM DX W/WO SPEC BR/WA SPX N/A 7/19/2023    Procedure: LAPAROSCOPY DIAGNOSTIC, LYSIS OF ADHESIONS, LAPAROSCOPY TAKE TAKEDOWN OF LEFT COLON, EXPLORATORY LAPAROSCOPY, LYSIS OF ADHESIONS, RESECTION OF TRANSVERSE COLON SPLENIC FLEXURE AND DESCENDING COLON , TAKE DOWN OF SPLENIC FLEXURE, SIGMOIDOSCOPY, MOBILIZATION OF RIGHT COLON, PRIMARY ANASTOMOSIS EEA 29, TAP BLOCK;  Surgeon: Crista Recinos MD;  [facility];  Service: General    LA TCAT IV STENT CRV CRTD ART EMBOLIC PROTECJ Left 4/29/2024    Procedure: ANGIOPLASTY ARTERY CAROTID W/ STENT;  Surgeon: Roberto García DO;  [facility];  Service: Vascular    LA TEAEC W/PATCH GRF CAROTID VERTB SUBCLAV NECK INC Left 1/31/2023    Procedure: EXPLORATION OF LEFT CAROTID ARTERY; ABORTED ENDARTERECTOMY ARTERY CAROTID;  Surgeon: Roberto García DO;  [facility];  Service: Vascular    SUPERIOR MESTENTERIC ARTERY STENT      " WISDOM TOOTH EXTRACTION       Family History   Problem Relation Age of Onset    Lung cancer Mother 60    Brain cancer Mother 60    Diabetes Father     Depression Father     Other Father         septic    Allergies Sister     Hashimoto's thyroiditis Sister     Abdominal aortic aneurysm Sister     Diabetes Sister     No Known Problems Sister     No Known Problems Maternal Grandmother     No Known Problems Maternal Grandfather     No Known Problems Paternal Grandmother     No Known Problems Paternal Grandfather     Hodgkin's lymphoma Brother     No Known Problems Brother     No Known Problems Maternal Aunt     Diabetes Other     Breast cancer Neg Hx       reports that she quit smoking about 6 years ago. Her smoking use included cigarettes. She started smoking about 16 years ago. She has a 5 pack-year smoking history. She has been exposed to tobacco smoke. She has never used smokeless tobacco. She reports that she does not currently use alcohol. She reports that she does not use drugs.  Current Outpatient Medications   Medication Instructions    acetaminophen (TYLENOL) 650 mg, Oral, Every 6 hours scheduled    apixaban (ELIQUIS) 5 mg, Oral, 2 times daily    butalbital-acetaminophen-caffeine (FIORICET,ESGIC) -40 mg per tablet 1 tablet, Oral, Every 4 hours PRN    chlordiazepoxide-clidinium (LIBRAX) 5-2.5 mg per capsule TAKE 1 CAPSULE BY MOUTH TWO TIMES DAILY AS NEEDED FOR INDIGESTION    chlorhexidine (PERIDEX) 0.12 % solution 15 mL, Mouth/Throat, 2 times daily    dexamethasone (DECADRON) 2 mg tablet One tab with breakfast (early in day to minimize impact on sleep, with food for stomach protection) for 1-5 days for unrelenting migraine    Diclofenac Sodium (VOLTAREN) 2 g, Topical, 4 times daily    diltiazem (CARDIZEM CD) 240 mg, Oral, Daily    Docusate Sodium (COLACE PO) Daily at bedtime    ezetimibe (ZETIA) 10 mg, Oral, Daily    famciclovir (FAMVIR) 500 mg, Oral, 3 times daily    fexofenadine (ALLEGRA) 180 mg, Daily     fluticasone-vilanterol (Breo Ellipta) 200-25 mcg/actuation inhaler 1 puff, Inhalation, Daily, Rinse mouth after use.    furosemide (LASIX) 40 mg, Oral, Daily    LORazepam (ATIVAN) 1 mg, Oral, 3 times daily    LORazepam (ATIVAN) 2 mg, Oral, Every 8 hours PRN    lubiprostone (AMITIZA) 8 mcg, Oral, 2 times daily with meals    magnesium Oxide (MAG-OX) 400 mg, Oral, Daily at bedtime    methocarbamol (ROBAXIN) 1,000 mg, Oral, 3 times daily    metoprolol succinate (TOPROL-XL) 100 mg, Oral, 2 times daily    nitrofurantoin (MACROBID) 100 mg, Oral, 2 times daily    omeprazole (PRILOSEC) 40 mg, Oral, Daily    ondansetron (ZOFRAN-ODT) 4 mg, Oral, Every 8 hours PRN    phenazopyridine (PYRIDIUM) 200 mg, Oral, 3 times daily with meals    phenazopyridine (PYRIDIUM) 200 mg, Oral, 3 times daily with meals    polyethylene glycol (Golytely) 4000 mL solution 4,000 mL, Oral, Once, Take 4000 mL by mouth once for 1 dose. Use as directed    polyethylene glycol (MIRALAX) 238 g, Oral, Once, Take 238 g my mouth. Use as directed    polyethylene glycol (MIRALAX) 17 g, Oral, 2 times daily    promethazine (PHENERGAN) 25 mg tablet TAKE 1 TABLET BY MOUTH EVERY 6 HOURS AS NEEDED FOR NAUSEA AND/OR VOMITING    Repatha SureClick 140 mg, Subcutaneous, Every 14 days    spironolactone (ALDACTONE) 25 mg, Oral, 2 times daily    sucralfate (CARAFATE) 1 g, Oral, 4 times daily    topiramate (TOPAMAX) 25 mg tablet 25 mg p.o. in a.m. and p.m. for 1 week, then increase as tolerated to 25 mg QAM and 50 mg QHS for 1 week and finish at 50 mg in a.m. and p.m.    traMADol (ULTRAM) 100 mg, Oral, Every 8 hours PRN    triamcinolone (KENALOG) 0.1 % ointment Topical, 2 times daily, START applying triamcinolone 0.1% ointment to the affected area twice daily for up to 2 weeks for flares. Do not use for more than 2 weeks.     Allergies   Allergen Reactions    Ace Inhibitors Angioedema and Anaphylaxis     Anaphylaxis    Benicar [Olmesartan] Angioedema     See Allergy note  "from 9/11/2008.  Swollen ankles/legs    Hydralazine Other (See Comments)     Lip swelling  Flank pain    Other Anaphylaxis and Other (See Comments)     Preservatives- itching throat closes, hi  E Z Cat scan contrast - hives throat closes itching,  Preservatives- itching throat closes, hi  Artificial sweeteners    Valsartan Angioedema     Lips, face swollen    Ampicillin Hives     Depends on brand some preservatives can react. Has recently tolerated oral amoxicillin.    Sulfa Antibiotics Hives     stuffiness,itching,hives,throat closing--per pt \"sometimes able to take depending on the brand and the filler or possibly preservatives in it\"    Motrin [Ibuprofen] Other (See Comments)     Per pt \" told not to take due to A Fib\"    Wound Dressing Adhesive Other (See Comments)     Per pt Adhesive on EKG leads caused redness      Current Outpatient Medications on File Prior to Visit   Medication Sig Dispense Refill    acetaminophen (TYLENOL) 325 mg tablet Take 2 tablets (650 mg total) by mouth every 6 (six) hours  0    apixaban (Eliquis) 5 mg Take 1 tablet (5 mg total) by mouth 2 (two) times a day 180 tablet 3    butalbital-acetaminophen-caffeine (FIORICET,ESGIC) -40 mg per tablet Take 1 tablet by mouth every 4 (four) hours as needed for headaches 20 tablet 0    chlordiazepoxide-clidinium (LIBRAX) 5-2.5 mg per capsule TAKE 1 CAPSULE BY MOUTH TWO TIMES DAILY AS NEEDED FOR INDIGESTION 60 capsule 2    chlorhexidine (PERIDEX) 0.12 % solution Apply 15 mL to the mouth or throat 2 (two) times a day 120 mL 0    dexamethasone (DECADRON) 2 mg tablet One tab with breakfast (early in day to minimize impact on sleep, with food for stomach protection) for 1-5 days for unrelenting migraine 5 tablet 0    Diclofenac Sodium (VOLTAREN) 1 % Apply 2 g topically 4 (four) times a day 100 g 1    diltiazem (CARDIZEM CD) 240 mg 24 hr capsule Take 1 capsule (240 mg total) by mouth daily 100 capsule 0    Docusate Sodium (COLACE PO) Take by mouth " daily at bedtime      Evolocumab (Repatha SureClick) 140 MG/ML SOAJ Inject 1 mL (140 mg total) under the skin every 14 (fourteen) days 6 mL 5    fluticasone-vilanterol (Breo Ellipta) 200-25 mcg/actuation inhaler Inhale 1 puff daily Rinse mouth after use. 200 blister 1    furosemide (LASIX) 40 mg tablet Take 1 tablet (40 mg total) by mouth daily 90 tablet 1    LORazepam (ATIVAN) 2 mg tablet Take 1 tablet (2 mg total) by mouth every 8 (eight) hours as needed for anxiety 90 tablet 5    methocarbamol (ROBAXIN) 500 mg tablet TAKE TWO TABLETS BY MOUTH THREE TIMES A  tablet 2    metoprolol succinate (TOPROL-XL) 100 mg 24 hr tablet Take 1 tablet (100 mg total) by mouth 2 (two) times a day 180 tablet 1    nitrofurantoin (MACROBID) 100 mg capsule Take 1 capsule (100 mg total) by mouth 2 (two) times a day for 7 days 14 capsule 0    omeprazole (PriLOSEC) 40 MG capsule Take 1 capsule (40 mg total) by mouth daily 90 capsule 1    ondansetron (ZOFRAN-ODT) 4 mg disintegrating tablet Take 1 tablet (4 mg total) by mouth every 8 (eight) hours as needed for nausea 21 tablet 5    phenazopyridine (PYRIDIUM) 200 mg tablet Take 1 tablet (200 mg total) by mouth 3 (three) times a day with meals 10 tablet 0    promethazine (PHENERGAN) 25 mg tablet TAKE 1 TABLET BY MOUTH EVERY 6 HOURS AS NEEDED FOR NAUSEA AND/OR VOMITING 60 tablet 2    spironolactone (ALDACTONE) 25 mg tablet Take 1 tablet (25 mg total) by mouth 2 (two) times a day 180 tablet 1    sucralfate (CARAFATE) 1 g tablet Take 1 tablet (1 g total) by mouth 4 (four) times a day 120 tablet 5    traMADol (ULTRAM) 50 mg tablet Take 2 tablets (100 mg total) by mouth every 8 (eight) hours as needed for moderate pain 180 tablet 5    triamcinolone (KENALOG) 0.1 % ointment Apply topically 2 (two) times a day START applying triamcinolone 0.1% ointment to the affected area twice daily for up to 2 weeks for flares. Do not use for more than 2 weeks. 80 g 3    [DISCONTINUED] polyethylene glycol  (MIRALAX) 17 g packet Take 17 g by mouth daily as needed (constipation)      ezetimibe (ZETIA) 10 mg tablet Take 1 tablet (10 mg total) by mouth daily (Patient not taking: Reported on 3/3/2025) 30 tablet 5    famciclovir (FAMVIR) 500 mg tablet Take 1 tablet (500 mg total) by mouth 3 (three) times a day for 7 days (Patient not taking: Reported on 2025) 21 tablet 0    fexofenadine (ALLEGRA) 180 MG tablet Take 180 mg by mouth daily (Patient not taking: Reported on 2025)      LORazepam (ATIVAN) 1 mg tablet Take 1 tablet (1 mg total) by mouth 3 (three) times a day (Patient not taking: Reported on 2025) 90 tablet 0    magnesium Oxide (MAG-OX) 400 mg TABS Take 1 tablet (400 mg total) by mouth daily at bedtime (Patient not taking: Reported on 3/28/2025) 90 tablet 3    phenazopyridine (PYRIDIUM) 200 mg tablet Take 1 tablet (200 mg total) by mouth 3 (three) times a day with meals (Patient not taking: Reported on 3/28/2025) 90 tablet 5    topiramate (TOPAMAX) 25 mg tablet 25 mg p.o. in a.m. and p.m. for 1 week, then increase as tolerated to 25 mg QAM and 50 mg QHS for 1 week and finish at 50 mg in a.m. and p.m. (Patient not taking: Reported on 2025) 120 tablet 4     Current Facility-Administered Medications on File Prior to Visit   Medication Dose Route Frequency Provider Last Rate Last Admin    cyanocobalamin injection 1,000 mcg  1,000 mcg Intramuscular Q30 Days Coy Ayoub DO   1,000 mcg at 25 1325      Social History     Tobacco Use    Smoking status: Former     Current packs/day: 0.00     Average packs/day: 0.5 packs/day for 10.0 years (5.0 ttl pk-yrs)     Types: Cigarettes     Start date:      Quit date: 2019     Years since quittin.2     Passive exposure: Past    Smokeless tobacco: Never   Vaping Use    Vaping status: Never Used   Substance and Sexual Activity    Alcohol use: Not Currently    Drug use: No    Sexual activity: Not Currently     Comment: defer        Objective   /80  "(BP Location: Left arm, Patient Position: Sitting, Cuff Size: Large)   Pulse 62   Temp 98.1 °F (36.7 °C) (Temporal)   Ht 5' 4\" (1.626 m)   Wt 94.3 kg (208 lb)   LMP  (LMP Unknown)   SpO2 94%   BMI 35.70 kg/m²      Physical Exam  Constitutional:       General: She is not in acute distress.     Appearance: She is well-developed.   HENT:      Head: Normocephalic and atraumatic.   Eyes:      General: No scleral icterus.     Conjunctiva/sclera: Conjunctivae normal.   Neck:      Trachea: No tracheal deviation.   Cardiovascular:      Rate and Rhythm: Normal rate and regular rhythm.   Pulmonary:      Effort: Pulmonary effort is normal.   Abdominal:      Palpations: Abdomen is soft. Mass: no appreciable aortic pulsation/aneurysm.   Musculoskeletal:         General: Normal range of motion.      Cervical back: Normal range of motion and neck supple.   Skin:     General: Skin is warm and dry.   Neurological:      Mental Status: She is alert and oriented to person, place, and time.   Psychiatric:         Mood and Affect: Mood normal.         Behavior: Behavior normal.         Thought Content: Thought content normal.         Judgment: Judgment normal.     Carotid duplex:  CONCLUSION:     Impression:  RIGHT:  There is <50% stenosis noted in the internal carotid artery. Plaque is  heterogenous and irregular.  Vertebral artery flow is antegrade. There is no significant subclavian artery  disease.     LEFT:  There is a widely patent internal carotid artery and stent.  Vertebral artery flow is antegrade however severely diminished suggesting more  proximal disease. There is no significant subclavian artery disease.     Compared to previous study on 6/17/2024, there is no significant change.    Administrative Statements   I have spent a total time of 30 minutes in caring for this patient on the day of the visit/encounter including Diagnostic results, Prognosis, Risks and benefits of tx options, Instructions for management, " Patient and family education, Importance of tx compliance, Risk factor reductions, Impressions, Counseling / Coordination of care, Documenting in the medical record, Reviewing/placing orders in the medical record (including tests, medications, and/or procedures), and Obtaining or reviewing history  .

## 2025-04-07 ENCOUNTER — ANESTHESIA (OUTPATIENT)
Dept: GASTROENTEROLOGY | Facility: HOSPITAL | Age: 65
End: 2025-04-07
Payer: COMMERCIAL

## 2025-04-07 ENCOUNTER — PREP FOR PROCEDURE (OUTPATIENT)
Dept: GASTROENTEROLOGY | Facility: CLINIC | Age: 65
End: 2025-04-07

## 2025-04-07 ENCOUNTER — ANESTHESIA EVENT (OUTPATIENT)
Dept: GASTROENTEROLOGY | Facility: HOSPITAL | Age: 65
End: 2025-04-07
Payer: COMMERCIAL

## 2025-04-07 ENCOUNTER — HOSPITAL ENCOUNTER (OUTPATIENT)
Dept: GASTROENTEROLOGY | Facility: HOSPITAL | Age: 65
Setting detail: OUTPATIENT SURGERY
Discharge: HOME | End: 2025-04-07
Attending: INTERNAL MEDICINE
Payer: COMMERCIAL

## 2025-04-07 ENCOUNTER — APPOINTMENT (OUTPATIENT)
Dept: GASTROENTEROLOGY | Facility: HOSPITAL | Age: 65
End: 2025-04-07
Attending: INTERNAL MEDICINE
Payer: COMMERCIAL

## 2025-04-07 VITALS
RESPIRATION RATE: 18 BRPM | OXYGEN SATURATION: 94 % | TEMPERATURE: 96.3 F | HEART RATE: 63 BPM | DIASTOLIC BLOOD PRESSURE: 75 MMHG | SYSTOLIC BLOOD PRESSURE: 141 MMHG

## 2025-04-07 DIAGNOSIS — K43.9 ABDOMINAL WALL HERNIA: ICD-10-CM

## 2025-04-07 DIAGNOSIS — K21.9 GASTROESOPHAGEAL REFLUX DISEASE WITHOUT ESOPHAGITIS: ICD-10-CM

## 2025-04-07 DIAGNOSIS — Z90.49 HISTORY OF COLON RESECTION: ICD-10-CM

## 2025-04-07 DIAGNOSIS — K62.5 RECTAL BLEEDING: ICD-10-CM

## 2025-04-07 DIAGNOSIS — K59.09 OTHER CONSTIPATION: ICD-10-CM

## 2025-04-07 DIAGNOSIS — R14.0 BLOATING: ICD-10-CM

## 2025-04-07 DIAGNOSIS — R68.81 EARLY SATIETY: ICD-10-CM

## 2025-04-07 DIAGNOSIS — R10.9 ABDOMINAL PAIN, UNSPECIFIED ABDOMINAL LOCATION: ICD-10-CM

## 2025-04-07 DIAGNOSIS — K59.00 CONSTIPATION, UNSPECIFIED CONSTIPATION TYPE: ICD-10-CM

## 2025-04-07 DIAGNOSIS — K62.5 RECTAL BLEEDING: Primary | ICD-10-CM

## 2025-04-07 PROCEDURE — 88305 TISSUE EXAM BY PATHOLOGIST: CPT | Performed by: PATHOLOGY

## 2025-04-07 RX ORDER — POLYETHYLENE GLYCOL 3350 17 G/17G
238 POWDER, FOR SOLUTION ORAL ONCE
Qty: 238 G | Refills: 0 | Status: SHIPPED | OUTPATIENT
Start: 2025-04-07 | End: 2025-04-07

## 2025-04-07 RX ORDER — PROPOFOL 10 MG/ML
INJECTION, EMULSION INTRAVENOUS AS NEEDED
Status: DISCONTINUED | OUTPATIENT
Start: 2025-04-07 | End: 2025-04-07

## 2025-04-07 RX ORDER — POLYETHYLENE GLYCOL 3350 17 G/17G
17 POWDER, FOR SOLUTION ORAL 2 TIMES DAILY
Qty: 1020 G | Refills: 2 | Status: SHIPPED | OUTPATIENT
Start: 2025-04-07 | End: 2025-07-06

## 2025-04-07 RX ORDER — ONDANSETRON 2 MG/ML
4 INJECTION INTRAMUSCULAR; INTRAVENOUS ONCE AS NEEDED
Status: DISCONTINUED | OUTPATIENT
Start: 2025-04-07 | End: 2025-04-11 | Stop reason: HOSPADM

## 2025-04-07 RX ORDER — LUBIPROSTONE 8 UG/1
8 CAPSULE ORAL 2 TIMES DAILY WITH MEALS
Qty: 60 CAPSULE | Refills: 2 | Status: SHIPPED | OUTPATIENT
Start: 2025-04-07 | End: 2025-07-06

## 2025-04-07 RX ORDER — POLYETHYLENE GLYCOL 3350, SODIUM SULFATE ANHYDROUS, SODIUM BICARBONATE, SODIUM CHLORIDE, POTASSIUM CHLORIDE 236; 22.74; 6.74; 5.86; 2.97 G/4L; G/4L; G/4L; G/4L; G/4L
4000 POWDER, FOR SOLUTION ORAL ONCE
Qty: 4000 ML | Refills: 0 | Status: SHIPPED | OUTPATIENT
Start: 2025-04-07 | End: 2025-04-07

## 2025-04-07 RX ORDER — SODIUM CHLORIDE, SODIUM LACTATE, POTASSIUM CHLORIDE, CALCIUM CHLORIDE 600; 310; 30; 20 MG/100ML; MG/100ML; MG/100ML; MG/100ML
125 INJECTION, SOLUTION INTRAVENOUS CONTINUOUS
Status: DISCONTINUED | OUTPATIENT
Start: 2025-04-07 | End: 2025-04-11 | Stop reason: HOSPADM

## 2025-04-07 RX ORDER — SODIUM CHLORIDE 9 MG/ML
125 INJECTION, SOLUTION INTRAVENOUS CONTINUOUS
Status: DISCONTINUED | OUTPATIENT
Start: 2025-04-07 | End: 2025-04-11 | Stop reason: HOSPADM

## 2025-04-07 RX ADMIN — PROPOFOL 150 MG: 10 INJECTION, EMULSION INTRAVENOUS at 12:36

## 2025-04-07 RX ADMIN — PROPOFOL 50 MG: 10 INJECTION, EMULSION INTRAVENOUS at 12:45

## 2025-04-07 RX ADMIN — PROPOFOL 50 MG: 10 INJECTION, EMULSION INTRAVENOUS at 12:38

## 2025-04-07 RX ADMIN — PROPOFOL 50 MG: 10 INJECTION, EMULSION INTRAVENOUS at 12:41

## 2025-04-07 RX ADMIN — SODIUM CHLORIDE, SODIUM LACTATE, POTASSIUM CHLORIDE, AND CALCIUM CHLORIDE 125 ML/HR: .6; .31; .03; .02 INJECTION, SOLUTION INTRAVENOUS at 11:46

## 2025-04-07 NOTE — ANESTHESIA POSTPROCEDURE EVALUATION
Post-Op Assessment Note    CV Status:  Stable  Pain Score: 0    Pain management: adequate       Mental Status:  Sleepy and arousable   Hydration Status:  Stable   PONV Controlled:  None   Airway Patency:  Patent     Post Op Vitals Reviewed: Yes    No anethesia notable event occurred.    Staff: CRNA       Last Filed PACU Vitals:  Vitals Value Taken Time   Temp     Pulse     BP     Resp     SpO2

## 2025-04-07 NOTE — PERIOPERATIVE NURSING NOTE
----- Message from Nasreen Mckeon MD sent at 1/9/2020  9:42 AM CST -----  Please let the daughter know that pt's A1C is elevated at 8.2. I will increase the Metformin to 850mg twice daily (instead of 500mg twice daily). Continue all other meds, RTC 3 months. Cecum not reached, poor prep

## 2025-04-07 NOTE — H&P
"History and Physical - [facility]  Nancy Jones 65 y.o. female MRN: [id number]                  HPI: Nancy Jones is a 65 y.o. year old female who presents for GERD, constipation, rectal bleeding, hx of ischemic colitis s/p resection.        REVIEW OF SYSTEMS: Per the HPI, and otherwise unremarkable.    Historical Information   Past Medical History:   Diagnosis Date    A-fib ([facility]) 03/2020    Allergic     Anxiety     Arthritis     Asthma     Back pain     and muscle pain    Bruises easily     Chronic pain disorder     Interstitial pain    Colon polyp     Dental bridge present     Depression     Eczema     GERD (gastroesophageal reflux disease)     Heart murmur     History of blood clots     per pt \"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\"    History of pneumonia     Hives     Hyperlipidemia     Hypertension     Infertility, female     Interstitial cystitis     Migraine     sees neurologist    Motion sickness     Obesity     Palpitations     PONV (postoperative nausea and vomiting)     Premature atrial contractions 11/09/2022    Psychiatric disorder     TIA (transient ischemic attack) 09/2022    per pt --\"had MRI and 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\"    Urinary incontinence     wears Pads    Venous insufficiency 08/01/2017    Wears glasses      Past Surgical History:   Procedure Laterality Date    COLONOSCOPY      DILATION AND CURETTAGE OF UTERUS      EGD      EXPLORATORY LAPAROTOMY      for infertility    EXPLORATORY LAPAROTOMY W/ BOWEL RESECTION N/A 7/19/2023    Procedure: LAPAROTOMY EXPLORATORY W/ BOWEL RESECTION;  Surgeon: Crisat Recinos MD;  [facility];  Service: General    HAND SURGERY      Hand excision of tendon cyst    MI LAPAROSCOPIC APPENDECTOMY N/A 05/03/2022    Procedure: APPENDECTOMY LAPAROSCOPIC;  Surgeon: Vin Fields MD;  Location:  MAIN OR;  Service: General    MI LAPS ABD PRTM&OMENTUM DX W/WO SPEC BR/WA SPX N/A 7/19/2023    Procedure: " LAPAROSCOPY DIAGNOSTIC, LYSIS OF ADHESIONS, LAPAROSCOPY TAKE TAKEDOWN OF LEFT COLON, EXPLORATORY LAPAROSCOPY, LYSIS OF ADHESIONS, RESECTION OF TRANSVERSE COLON SPLENIC FLEXURE AND DESCENDING COLON , TAKE DOWN OF SPLENIC FLEXURE, SIGMOIDOSCOPY, MOBILIZATION OF RIGHT COLON, PRIMARY ANASTOMOSIS EEA 29, TAP BLOCK;  Surgeon: Crista Recinos MD;  [facility];  Service: General    KY TCAT IV STENT CRV CRTD ART EMBOLIC PROTECJ Left 2024    Procedure: ANGIOPLASTY ARTERY CAROTID W/ STENT;  Surgeon: Roberto García DO;  [facility];  Service: Vascular    KY TEAEC W/PATCH GRF CAROTID VERTB SUBCLAV NECK INC Left 2023    Procedure: EXPLORATION OF LEFT CAROTID ARTERY; ABORTED ENDARTERECTOMY ARTERY CAROTID;  Surgeon: Roberto García DO;  [facility];  Service: Vascular    SUPERIOR MESTENTERIC ARTERY STENT      WISDOM TOOTH EXTRACTION       Social History   Social History     Substance and Sexual Activity   Alcohol Use Not Currently     Social History     Substance and Sexual Activity   Drug Use No     Social History     Tobacco Use   Smoking Status Former    Current packs/day: 0.00    Average packs/day: 0.5 packs/day for 10.0 years (5.0 ttl pk-yrs)    Types: Cigarettes    Start date:     Quit date:     Years since quittin.2    Passive exposure: Past   Smokeless Tobacco Never     Family History   Problem Relation Age of Onset    Lung cancer Mother 60    Brain cancer Mother 60    Diabetes Father     Depression Father     Other Father         septic    Allergies Sister     Hashimoto's thyroiditis Sister     Abdominal aortic aneurysm Sister     Diabetes Sister     No Known Problems Sister     No Known Problems Maternal Grandmother     No Known Problems Maternal Grandfather     No Known Problems Paternal Grandmother     No Known Problems Paternal Grandfather     Hodgkin's lymphoma Brother     No Known Problems Brother     No Known Problems Maternal Aunt     Diabetes Other      Breast cancer Neg Hx        Meds/Allergies       Current Outpatient Medications:     acetaminophen (TYLENOL) 325 mg tablet    apixaban (Eliquis) 5 mg    butalbital-acetaminophen-caffeine (FIORICET,ESGIC) -40 mg per tablet    chlordiazepoxide-clidinium (LIBRAX) 5-2.5 mg per capsule    chlorhexidine (PERIDEX) 0.12 % solution    dexamethasone (DECADRON) 2 mg tablet    Diclofenac Sodium (VOLTAREN) 1 %    diltiazem (CARDIZEM CD) 240 mg 24 hr capsule    Docusate Sodium (COLACE PO)    Evolocumab (Repatha SureClick) 140 MG/ML SOAJ    ezetimibe (ZETIA) 10 mg tablet    famciclovir (FAMVIR) 500 mg tablet    fexofenadine (ALLEGRA) 180 MG tablet    fluticasone-vilanterol (Breo Ellipta) 200-25 mcg/actuation inhaler    furosemide (LASIX) 40 mg tablet    LORazepam (ATIVAN) 1 mg tablet    LORazepam (ATIVAN) 2 mg tablet    magnesium Oxide (MAG-OX) 400 mg TABS    methocarbamol (ROBAXIN) 500 mg tablet    metoprolol succinate (TOPROL-XL) 100 mg 24 hr tablet    nitrofurantoin (MACROBID) 100 mg capsule    omeprazole (PriLOSEC) 40 MG capsule    ondansetron (ZOFRAN-ODT) 4 mg disintegrating tablet    phenazopyridine (PYRIDIUM) 200 mg tablet    phenazopyridine (PYRIDIUM) 200 mg tablet    polyethylene glycol (MIRALAX) 17 g packet    promethazine (PHENERGAN) 25 mg tablet    spironolactone (ALDACTONE) 25 mg tablet    sucralfate (CARAFATE) 1 g tablet    topiramate (TOPAMAX) 25 mg tablet    traMADol (ULTRAM) 50 mg tablet    triamcinolone (KENALOG) 0.1 % ointment    Current Facility-Administered Medications:     cyanocobalamin injection 1,000 mcg, 1,000 mcg, Intramuscular, Q30 Days, 1,000 mcg at 03/28/25 1325    Allergies   Allergen Reactions    Ace Inhibitors Angioedema and Anaphylaxis     Anaphylaxis    Benicar [Olmesartan] Angioedema     See Allergy note from 9/11/2008.  Swollen ankles/legs    Hydralazine Other (See Comments)     Lip swelling  Flank pain    Other Anaphylaxis and Other (See Comments)     Preservatives- itching throat  "closes, hi  E Z Cat scan contrast - hives throat closes itching,  Preservatives- itching throat closes, hi  Artificial sweeteners    Valsartan Angioedema     Lips, face swollen    Ampicillin Hives     Depends on brand some preservatives can react. Has recently tolerated oral amoxicillin.    Sulfa Antibiotics Hives     stuffiness,itching,hives,throat closing--per pt \"sometimes able to take depending on the brand and the filler or possibly preservatives in it\"    Motrin [Ibuprofen] Other (See Comments)     Per pt \" told not to take due to A Fib\"    Wound Dressing Adhesive Other (See Comments)     Per pt Adhesive on EKG leads caused redness       Objective     LMP  (LMP Unknown)       PHYSICAL EXAM    Gen: NAD  Head: NCAT  CV: RRR  CHEST: Clear  ABD: soft, NT/ND  EXT: no edema      ASSESSMENT/PLAN:  This is a 65 y.o. year old female here for EGD/colonoscopy, and she is stable and optimized for her procedure.    "

## 2025-04-07 NOTE — ANESTHESIA PREPROCEDURE EVALUATION
Procedure:  COLONOSCOPY    Relevant Problems   CARDIO   (+) A-fib (HCC)   (+) Angina pectoris (HCC)   (+) Asymptomatic stenosis of left carotid artery   (+) Atherosclerosis of native arteries of extremities with rest pain, bilateral legs ([facility])   (+) Carotid artery stenosis   (+) Chronic migraine w/o aura w/o status migrainosus, not intractable   (+) Essential hypertension   (+) Hypertensive heart disease with congestive heart failure (HCC)   (+) Left carotid stenosis   (+) 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HCC)   (+) Migraines   (+) Other chest pain   (+) Peripheral vascular disease (HCC)   (+) Stenosis of left carotid artery      GI/HEPATIC   (+) Gastroesophageal reflux disease without esophagitis   (+) Hepatic steatosis      /RENAL   (+) Stage 3 chronic kidney disease, unspecified whether stage 3a or 3b CKD ([facility])      NEURO/PSYCH   (+) Anxiety   (+) Chronic migraine w/o aura w/o status migrainosus, not intractable   (+) Migraines   (+) Paresthesia      PULMONARY   (+) COPD (chronic obstructive pulmonary disease) (HCC)   (+) CAMILLE (obstructive sleep apnea)   (+) Pharyngitis due to Streptococcus species      Eye   (+) AMD (age-related macular degeneration), bilateral        Physical Exam    Airway    Mallampati score: III  TM Distance: >3 FB  Neck ROM: full     Dental   No notable dental hx     Cardiovascular  Rhythm: regular, Rate: normal, Cardiovascular exam normal    Pulmonary  Pulmonary exam normal Breath sounds clear to auscultation    Other Findings  post-pubertal.      Anesthesia Plan  ASA Score- 3     Anesthesia Type- IV sedation with anesthesia with ASA Monitors.         Additional Monitors:     Airway Plan:     Comment: Listed as sedation.  Sedation vs GA.       Plan Factors-    Chart reviewed.    Patient summary reviewed.                  Induction- intravenous.    Postoperative Plan-         Informed Consent- Anesthetic plan and risks discussed with patient.

## 2025-04-14 ENCOUNTER — RESULTS FOLLOW-UP (OUTPATIENT)
Dept: GASTROENTEROLOGY | Facility: CLINIC | Age: 65
End: 2025-04-14

## 2025-04-14 PROCEDURE — 88305 TISSUE EXAM BY PATHOLOGIST: CPT | Performed by: PATHOLOGY

## 2025-04-15 ENCOUNTER — TELEPHONE (OUTPATIENT)
Dept: UROLOGY | Facility: CLINIC | Age: 65
End: 2025-04-15

## 2025-04-15 DIAGNOSIS — N39.0 RECURRENT UTI: Primary | ICD-10-CM

## 2025-04-15 DIAGNOSIS — R39.82 CHRONIC BLADDER PAIN: ICD-10-CM

## 2025-04-15 DIAGNOSIS — N30.10 INTERSTITIAL CYSTITIS: ICD-10-CM

## 2025-04-15 DIAGNOSIS — R11.0 NAUSEA: ICD-10-CM

## 2025-04-15 RX ORDER — METHENAMINE, SODIUM PHOSPHATE, MONOBASIC, MONOHYDRATE, PHENYL SALICYLATE, METHYLENE BLUE, AND HYOSCYAMINE SULFATE 118; 40.8; 36; 10; .12 MG/1; MG/1; MG/1; MG/1; MG/1
1 CAPSULE ORAL 3 TIMES DAILY
Qty: 90 CAPSULE | Refills: 3 | Status: SHIPPED | OUTPATIENT
Start: 2025-04-15

## 2025-04-15 RX ORDER — CEPHALEXIN 500 MG/1
500 CAPSULE ORAL EVERY 6 HOURS SCHEDULED
Qty: 28 CAPSULE | Refills: 0 | Status: SHIPPED | OUTPATIENT
Start: 2025-04-15 | End: 2025-04-23

## 2025-04-15 NOTE — TELEPHONE ENCOUNTER
Patient called for refill promethazine 25 mg tablets.  Noted in process, she is requesting a 30 day supply, qty 120 if possible  She is out of the medication  Confirmed [facility]

## 2025-04-15 NOTE — TELEPHONE ENCOUNTER
Prescriptions for Keflex as well as Uribel to patient's pharmacy.  Please make sure patient has urine testing completed prior to starting antibiotics.

## 2025-04-15 NOTE — TELEPHONE ENCOUNTER
Contacted patient and made her aware a script for Uribel was sent to her pharmacy. Also advised that Keflex was sent and she can start taking this after providing a urine sample at the lab. Office to call with final urine culture results.

## 2025-04-15 NOTE — TELEPHONE ENCOUNTER
Called and spoke with Adina to make her aware her appointment with Dr. Hobson tomorrow needs to be re-scheduled. Appointment was adjusted to 6/12/25 in [place]. Patient states she is currently having burning with urination and feel like she has another UTI. Orders were placed for urine testing to rule out infection. Patient is also requesting a script for Uribel be sent to her [facility] pharmacy on file to take in place of the Pyridium, as she states the pyridium causes her kidneys to spasm.

## 2025-04-16 RX ORDER — PROMETHAZINE HYDROCHLORIDE 25 MG/1
TABLET ORAL
Qty: 120 TABLET | Refills: 0 | Status: SHIPPED | OUTPATIENT
Start: 2025-04-16

## 2025-04-17 ENCOUNTER — TELEPHONE (OUTPATIENT)
Dept: UROLOGY | Facility: AMBULATORY SURGERY CENTER | Age: 65
End: 2025-04-17

## 2025-04-17 NOTE — TELEPHONE ENCOUNTER
Called and left a voicemail message for patient that unfortunately Uribel is not covered by insurance. Advised that patient can continue prn pyridium if she desires.

## 2025-04-17 NOTE — TELEPHONE ENCOUNTER
Spoke with patient and made her aware unfortunately Uribel is not eligible for coverage or appeal with insurance. She will continue with pyridium as needed for the time being.

## 2025-04-17 NOTE — TELEPHONE ENCOUNTER
Contacted patient and made her aware a message was sent to the medication prior auth team to see if insurance appeal can be done. Patient thankful for the call/update.

## 2025-04-17 NOTE — TELEPHONE ENCOUNTER
Unfortunately Uribel was denied, patient will have to continue with Pyridium although not preferred.

## 2025-04-17 NOTE — TELEPHONE ENCOUNTER
[id number] - [place] - [address] [place] [address]          Meth-Hyo-M Bl-Na Phos-Ph Sal (Uribel) 118 MG CAPS

## 2025-04-17 NOTE — TELEPHONE ENCOUNTER
[facility] DENIED    Reason:(Screenshot if applicable)        Message sent to office clinical pool Yes    Denial letter scanned into Media Yes        **Please follow up with your patient regarding denial and next steps**

## 2025-04-17 NOTE — TELEPHONE ENCOUNTER
PA for URIBEL SUBMITTED to [employer]    via      [x][employer]-Case ID # [id number]       [x]PA sent as URGENT    All office notes, labs and other pertaining documents and studies sent. Clinical questions answered. Awaiting determination from insurance company.     Turnaround time for your insurance to make a decision on your Prior Authorization can take 7-21 business days.

## 2025-04-21 ENCOUNTER — NURSE TRIAGE (OUTPATIENT)
Age: 65
End: 2025-04-21

## 2025-04-21 ENCOUNTER — APPOINTMENT (OUTPATIENT)
Dept: LAB | Facility: HOSPITAL | Age: 65
End: 2025-04-21
Payer: COMMERCIAL

## 2025-04-21 LAB
BACTERIA UR QL AUTO: ABNORMAL /HPF
BILIRUB UR QL STRIP: NEGATIVE
CLARITY UR: CLEAR
COLOR UR: ABNORMAL
GLUCOSE UR STRIP-MCNC: NEGATIVE MG/DL
HGB UR QL STRIP.AUTO: NEGATIVE
HYALINE CASTS #/AREA URNS LPF: ABNORMAL /LPF
KETONES UR STRIP-MCNC: NEGATIVE MG/DL
LEUKOCYTE ESTERASE UR QL STRIP: ABNORMAL
NITRITE UR QL STRIP: NEGATIVE
NON-SQ EPI CELLS URNS QL MICRO: ABNORMAL /HPF
PH UR STRIP.AUTO: 6.5 [PH]
PROT UR STRIP-MCNC: NEGATIVE MG/DL
RBC #/AREA URNS AUTO: ABNORMAL /HPF
SP GR UR STRIP.AUTO: 1.01 (ref 1–1.03)
UROBILINOGEN UR STRIP-ACNC: <2 MG/DL
WBC #/AREA URNS AUTO: ABNORMAL /HPF

## 2025-04-21 PROCEDURE — 87086 URINE CULTURE/COLONY COUNT: CPT

## 2025-04-21 PROCEDURE — 81001 URINALYSIS AUTO W/SCOPE: CPT

## 2025-04-21 PROCEDURE — 87186 SC STD MICRODIL/AGAR DIL: CPT

## 2025-04-21 PROCEDURE — 87077 CULTURE AEROBIC IDENTIFY: CPT

## 2025-04-21 NOTE — TELEPHONE ENCOUNTER
"FOLLOW UP: 4/29/25    REASON FOR CONVERSATION: Rectal Bleeding    SYMPTOMS: rectal bleeding 4/17-4/19. PT on eliquis, skipped 1 dose. PT also c/o nausea, HA, dizziness, abd pain in naval radiating to lower back. Tearful and scared.    OTHER: PT requesting sooner appointment with Dr. Rankin; no sooner appointments available. PT next office visit added to waitlist.    PT warm-transferred to GI CTS-RN.    DISPOSITION: Call Transferred to PCP Now      Answer Assessment - Initial Assessment Questions  1. APPEARANCE of BLOOD: \"What color is it?\" \"Is it passed separately, on the surface of the stool, or mixed in with the stool?\"       BRB  2. AMOUNT: \"How much blood was passed?\"       A lot x3 days  3. FREQUENCY: \"How many times has blood been passed with the stools?\"       X3 days (4/17-4/17)  4. ONSET: \"When was the blood first seen in the stools?\" (Days or weeks)       4/17  5. DIARRHEA: \"Is there also some diarrhea?\" If Yes, ask: \"How many diarrhea stools in the past 24 hours?\"       denies  6. CONSTIPATION: \"Do you have constipation?\" If Yes, ask: \"How bad is it?\"      denies  7. RECURRENT SYMPTOMS: \"Have you had blood in your stools before?\" If Yes, ask: \"When was the last time?\" and \"What happened that time?\"       Yes, beginning of April 8. BLOOD THINNERS: \"Do you take any blood thinners?\" (e.g., Coumadin/warfarin, Pradaxa/dabigatran, aspirin)      Yes, eliquis, skipped 1 dose  9. OTHER SYMPTOMS: \"Do you have any other symptoms?\"  (e.g., abdomen pain, vomiting, dizziness, fever)      Nausea, abd pain from naval radiating to lower baack, HA, dizziness  10. PREGNANCY: \"Is there any chance you are pregnant?\" \"When was your last menstrual period?\"        N/a    Protocols used: Rectal Bleeding-Adult-[place]    "

## 2025-04-21 NOTE — TELEPHONE ENCOUNTER
FOLLOW UP: recommendations    REASON FOR CONVERSATION: Hemorrhoids    SYMPTOMS: Patient had some bright red bleeding on 4/17-4/19 when having bowel movement.  Blood in toilet and on toilet paper.  Patient taking miralax twice daily for constipation.  Some days have diarrhea.  Stool is now thin like a pencil and may have stool while urinating.   Patient having abdominal pain radiating into back as well 7/10.  Friday did have chills but was sweating.  May be urine issues but has not been able to get testing done.      OTHER: Advised hemorrhoid treatment, titration of miralax as needed and to get urine samples.  If starts with increased abdominal pain, vomiting or fevers to go to ED.      DISPOSITION: Home Care    Reason for Disposition   Affirmative: The patient has streaks of blood on the stool, and does not feel a hemorrhoid    Answer Assessment - Initial Assessment Questions  1. When did your symptoms start?   4/17-4/19  2. Is there bright red blood when wiping or streaks in the stool? If yes, how many occurrences have you had in the last 24 hours?  Yes those 3 days  3. Do you feel this is a mild, moderate, or severe amount of blood?   mild  4. Have your symptoms improved or weakened?   same  5. Are you experiencing more diarrhea or constipation?   Diarrhea to constipation.    6. Do you have anything prescribed or over the counter for this? XXX If so, did they offer any relief?   Amitiza and taking miralax.    7. Are you experiencing any additional symptoms? If yes, please describe what your symptoms are.   Abdominal cramping.    Protocols used: GI-Hemorrhoids-ADULT-[place]

## 2025-04-23 ENCOUNTER — OFFICE VISIT (OUTPATIENT)
Dept: NEUROLOGY | Facility: CLINIC | Age: 65
End: 2025-04-23
Payer: COMMERCIAL

## 2025-04-23 VITALS
DIASTOLIC BLOOD PRESSURE: 90 MMHG | HEIGHT: 64 IN | SYSTOLIC BLOOD PRESSURE: 158 MMHG | WEIGHT: 209 LBS | BODY MASS INDEX: 35.68 KG/M2 | TEMPERATURE: 98.2 F | HEART RATE: 76 BPM

## 2025-04-23 DIAGNOSIS — G47.33 OSA (OBSTRUCTIVE SLEEP APNEA): ICD-10-CM

## 2025-04-23 DIAGNOSIS — G43.709 CHRONIC MIGRAINE WITHOUT AURA WITHOUT STATUS MIGRAINOSUS, NOT INTRACTABLE: Primary | ICD-10-CM

## 2025-04-23 DIAGNOSIS — R09.02 HYPOXIA: ICD-10-CM

## 2025-04-23 LAB — BACTERIA UR CULT: ABNORMAL

## 2025-04-23 PROCEDURE — 99215 OFFICE O/P EST HI 40 MIN: CPT | Performed by: PSYCHIATRY & NEUROLOGY

## 2025-04-23 RX ORDER — LANOLIN ALCOHOL/MO/W.PET/CERES
400 CREAM (GRAM) TOPICAL
Qty: 90 TABLET | Refills: 4 | Status: SHIPPED | OUTPATIENT
Start: 2025-04-23

## 2025-04-23 NOTE — ASSESSMENT & PLAN NOTE
Assessment/Plan:   Nancy Jones is a very pleasant  65 y.o. female with a past medical history that includes  migraines, anxiety, depression, hypertension, chronic pain syndrome on chronic opioids (tramadol), hypertensive heart disease, peripheral vascular disease, gastric ulcer without hemorrhage or perforation, GERD, mesenteric artery thrombosis, stenosis of left carotid artery status post angioplasty 4/29/2024 (followed by Cardiology and vascular surgery) allergic rhinitis, vasomotor rhinitis, Moderate CAMILLE on CPAP, (home sleep study 12/2020 DENZEL 14), COPD, asthma, chronic neck pain, chronic back pain, lumbar radiculopathy, arthritis, atopic dermatitis, interstitial cystitis, patellar tendinitis, hyperlipidemia, insomnia, RLS, bilateral age-related macular degeneration, angioedema, hair loss, chronic fatigue, B12 deficiency, diverticulitis, paroxysmal atrial fibrillation on Eliquis referred here for evaluation of headache.  My initial evaluation 11/14/2022    Chronic Migraine without aura and with status migrainosus, not intractable  Moderate CAMILLE not on CPAP, hypoxia (home sleep study 12/2020 DENZEL 14, O2 down to 67% and 12.9% of the 419 mins was below 90%) -following with sleep medicine, recommended treating due to significant morbidity and mortality related  She reports a long history of headaches and migraines that seemed to increase around perimenopause in her 40s and then again in the past few years.  As of initial visit 11/14/2022 she reports chronic daily headaches for 2-3 years and worse over the past few months, likely due to stopping using her CPAP machine for the past few months.  In September 2022, she was hospitalized and evaluated by Neurology for stroke-like symptoms in the setting of migraine with negative MRI and symptoms lasting over 24 hours not consistent with TIA either, per inpatient neurology thought to be migraine with left-sided paresthesias.  She is on Eliquis already and also takes baby  aspirin daily, and as of initial visit multiple other NSAIDs which we discussed are likely not recommended considering the Eliquis.  She reports migraines are typically left retro-orbital pressure radiating into the left temporal region and less severe on the right temporal region.  She denies aura reports typical associated migrainous features with some unilateral autonomic features, less likely trigeminal autonomic cephalalgia.  - as of 11/14/2022:  Chronic daily headache for 2-3 years, worse migraine at least 3 days a week.  Recommended she start using her CPAP machine again as this has significant morbidity and mortality not used.  Recommended she discontinue Fioricet which she is taking up to 1-4 times in a day and typically daily, we discussed gradually wean off over the next days with goal of completely getting her off as this can increase migraines.  This also has significant caffeine in it that could be impacting her sleep and other conditions such as urinary symptoms.  She is also taking daily benzos for abdominal pain and daily benzos for anxiety as well as daily tramadol and recommend she discuss these with her providers if there are other options. Trial of emgality for prevention.   - as of 4/17/2023: She chose not yet start Emgality for migraine prevention, has in her fridge.  She reports she is taking Fioricet 3-4 times a day as well as Tylenol 3 times a day and we discussed Fioricet also has Tylenol in it, she was able to discontinue other p.o. NSAIDs and other excess caffeine thankfully.  We discussed again in detail weaning off Fioricet (which I NEVER recommend for headaches and is being prescribed by her PCP) and starting Emgality for migraine prevention.  We also discussed the significant morbidity and mortality of untreated sleep apnea and how this is likely contributing to most of her symptoms and she is awaiting follow-up sleep study and sleep medicine follow-up.  We discussed considering  adding rescue medication for migraine once migraines are more episodic.  - as of 6/28/2023: She finally did her loading dose of Emgality 6/4/2023 and had possible side effects afterwards that I suspect were worsening of IIH and cervical medullary compression and therefore we will discontinue and start trial of acetazolamide/Diamox with titration up.  Decadron helped earlier this month and will refill for acutely or episodically when symptoms flare.  Ubrelvy is not helping significantly and costing her plan a lot of money.  She was able to get herself off of Fioricet and we discussed okay to take Excedrin tension episodically, although she feels it makes her interstitial cystitis worse and therefore she is taking acetaminophen.  We also discussed the morbidity and mortality of untreated sleep apnea and how that is likely what is driving the IIH and encouraged her to continue to try and use any time sleeping.   - as of 9/6/2023: She continues to have moderate sleep apnea with significant prolonged hypoxia down to 67% and 419 minutes below 90% that is untreated we discussed the morbidity and mortality if this remains untreated.  Since last visit she was hospitalized in July for ischemic bowel and ended up having a bowel resection.  She reports she had transient change in vision one night that her doctors have ordered carotid US for.  She reports during hospitalization amiodarone was added for A-fib by cardiology.  A lot of these issues can be caused or contributed to by sleep apnea and hypoxia.  She continues to have daily headaches but not as bad as they were at last visit  and she only took acetazolamide may be 1 week or less at 125 mg as it was late to arrive and then she later was hospitalized where they discontinued it.  We discussed holding off at this time without known evidence of papilledema although again recommended following up with eye doctor for dilated eye exam as well as sleep medicine to treat the  sleep apnea.  Magnesium for headache prevention which may also help with her constipation.  Continue management through other providers for her other complex medical issues.  - as of 4/23/2025: She returns 1.5 years later reporting she believes she made the follow-up when she was doing worse as far as headaches, few months ago, but thankfully now she reports she is doing better with 1-2 headaches a week and she usually takes acetaminophen (or exedrin tension) for them and that helps, takes 1 Decadron max 3 times a month and she is not taking any Fioricet which is great (felt it made it worse last headache).  We discussed getting off Fioricet is a good step, less medications including/especially steroids the better as well (due to risks) and reiterated that her untreated sleep apnea with hypoxia for 419 minutes below 90% on her last sleep study likely is playing a role in many of her medical issues even if her headaches are better.  Reviewed MRI brain ordered by other providers since last visit with her as well as nonspecific over read.  Reordered sleep study and discussed she absolutely needs to follow-up with sleep medicine for treatment, if she meets criteria for inspire, can follow-up with ENT to discuss this option.     Workup:  -Noncontrast head CT 1/22/2025: No acute intracranial abnormality.   -MRI brain without contrast 10/29/24:  White matter changes suggestive of chronic microangiopathy. No acute intracranial pathology.  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 The vessel is not clearly identified in its distal V2 and V3 segments reconstituting at the distal V3/V4 segment likely via retrograde flow. Severe focal left ICA stenosis at its origin unchanged from prior study with evidence of prior endarterectomy. Moderate focal left ICA stenosis intracranially at its ophthalmic segment.  -MRI brain without contrast 2/21/2023: 1.  No acute infarction, intracranial hemorrhage or mass effect.  2.  Mild, chronic microangiopathy.  *On my retrospective review from 6/28/2023 we discussed I do see multiple signs of increased intracranial pressure including empty sella, prominent optic nerve sheaths with tortuous optic nerves, flattened posterior sclera  - MRI brain without contrast 09/01/2022: No evidence of acute infarct, intracranial hemorrhage or mass.  - CTA head and neck with without contrast 09/01/2022: 1.  No intracranial large vessel occlusion or critical stenosis. No aneurysm.  2.  Progression of atherosclerotic disease in the left cervical carotid artery with a new linear intraluminal defect at the origin/proximal ICA suggesting an ulcerated plaque resulting in about 80% stenosis of proximal ICA.  3.  Stable atherosclerotic change of right cervical carotid artery without hemodynamically significant stenosis.  - carotid ultrasound 10/07/2022: RIGHT: There is <50% stenosis noted in the internal carotid artery. Plaque is Heterogenous. Vertebral artery flow is antegrade. There is no significant subclavian artery disease.  LEFT: There is >70% stenosis noted in the internal carotid artery. Plaque is heterogenous. Vertebral artery flow is antegrade. There is no significant subclavian artery disease.  - MRI brain with without contrast 10/20/2018 for headache with facial paresthesias: Scattered anterior bifrontal white matter T2 and FLAIR hyperintense foci which are nonspecific but suspicious for mild chronic  microangiopathic changes such as may accompany migraine headaches. Findings are stable.  No pathologic enhancement. No acute ischemic disease identified by diffusion imaging    Preventative:  - we discussed headache hygiene and lifestyle factors that may improve headaches  -  magnesium Oxide (MAG-OX) 400 mg TABS; Take 1 tablet (400 mg total) by mouth daily at bedtime. Discussed proper use, possible side effects and risks.  - Currently on through other providers:  Furosemide/Lasix 40 mg (PCP), spironolactone/Aldactone 25 mg BID (PCP), metoprolol 100 mg b.i.d. (PCP), diltiazem 120 mg  (cards, decreased from 180 mg during hospital stay she thinks and they added amiodarone), eliquis  As of 4/2025 Since last visit at some point stopped amiodarone 200 mg daily  - Past/ failed/contraindicated: ACE inhibitors anaphylaxis, valsartan side effects, Olmesartan side effects, metoprolol, Propranolol contraindicated due to history of asthma and interaction with current medications, Amitriptyline, paroxetine/paxil, bupropion/wellbutrin, citalopram/celexa, Gabapentin   - future options:  Alternative CGRP med, botox, acetazolamide    Rescue:  - discussed not taking over-the-counter or prescription pain medications more than 3 days per week to prevent medication overuse/rebound headache  - Currently on through other providers: No longer taking 3-4 Fioricet a day (none at all), (325) Tylenol, tramadol 2 tabs 0-2 times a day for stomach or bladder pain for IC, Librax chlordiazepoxide-clidinium (anti spasmodic agent/benzo) - prn abdominal pain 1-2 times a day - has not taken in a while,  ondansetron for nausea  -   Back up option rarely when severe: dexamethasone (DECADRON) 4 mg tablet; 1 tab PO with breakfast for 1 to 5 days for unrelenting migraine. Discussed proper use, off label use with certain meds, possible side effects and risks.  - Past/ failed/contraindicated:  Toradol has helped but she is on Eliquis and contraindicated,  "sumatriptan, rizatriptan she thinks helped  - past/helped: Dexamethasone 4 mg tab helped without side effects  - future options:  Could consider Triptan (no history of stroke and does not appear to have CAD), prochlorperazine,  could consider trial of 5 days of Depakote 500 mg nightly or dexamethasone 2 mg daily for prolonged migraine, ubrelvy, reyvow, nurtec      Patient instructions       Please do follow-up with eye doctor for dilated eye exam and please let me know if they see any evidence of papilledema or swelling behind your eyes.  Do not trust that they will send me the note.    Please do not drive if not seeing well that day or if not feeling cognitive delay well that day.    Please do follow-up with the sleep medicine team and treat the sleep apnea anytime you are sleeping as this is a very important issue that is likely contributing to many of your other conditions that we are medicating.  Continue to try and sleep on your side, consider zzoma pillow     ----  Do what ever you can to treat your sleep apnea as this is likely what is contributing to other chronic pain and carries with it significant morbidity and mortality if it remains untreated.     I am placing a referral for a sleep study and if it is abnormal, I will be happy to place a referral to the sleep medicine specialist team to further evaluate your sleep issues. That can be done now or anytime you would like, but you do not have to see them until after the results.    Please call [phone] OR can schedule on [employer] via \"scheduling ticket\" to schedule the sleep study and we discussed due to the new order being labeled \" ambulatory referral to sleep medicine\" you have to wait 2-6 days for the sleep team to turn this referral into the actual sleep study order that you can schedule, otherwise if you call now they may say there is no order for sleep study, just an order for a referral, because they may not see it yet on their end as a study " order.  The referral to sleep medicine piece is only if there is abnormalities and you need to see them afterwards.    After the sleep study is completed if there is abnormal results that need treatment, I recommend you see one of the following providers in our office:  Tushar Avelar PA-C      For inspire I believe we have multiple ENTs in [facility] who do it  - Dr. Jackman at [facility]  - Dr. Hobbs       Headache/migraine treatment:   Rescue medications (for immediate treatment of a headache):   It is ok to take acetaminophen  if they help your headaches you should limit these to No more than 3 times a week to avoid medication overuse/rebound headaches.       Try Excedrin tension instead of Excedrin Migraine so that you are not adding an additional 3 aspirin to your daily aspirin and Eliquis    -     dexamethasone (DECADRON) 2 mg tablet; 1 tab PO with breakfast for 1 to 5 days for unrelenting migraine    Over the counter preventive supplements for headaches/migraines (if you try, try for 3 months straight)  (to take every day to help prevent headaches - not to take at the time of headache):  [x] Magnesium 400mg daily (If any diarrhea or upset stomach, decrease dose  as tolerated)    Prescription preventive medications for headaches/migraines   (to take every day to help prevent headaches - not to take at the time of headache):  [x]  We have options if you would like/need    *Typically these types of medications take time until you see the benefit, although some may see improvement in days, often it may take weeks, especially if the medication is being titrated up to a beneficial level. Please contact us if there are any concerns or questions regarding the medication.     Lifestyle Recommendations:  [x] SLEEP - Maintain a regular sleep schedule: Adults need at least 7-8  hours of uninterrupted a night. Maintain good sleep hygiene:  Going to bed and waking up at consistent times, avoiding excessive daytime naps, avoiding caffeinated beverages in the evening, avoid excessive stimulation in the evening and generally using bed primarily for sleeping.  One hour before bedtime would recommend turning lights down lower, decreasing your activity (may read quietly, listen to music at a low volume). When you get into bed, should eliminate all technology (no texting, emailing, playing with your phone, iPad or tablet in bed).  [x] HYDRATION - Maintain good hydration.  Drink  2L of fluid a day (4 typical small water bottles)  [x] DIET - Maintain good nutrition. In particular don't skip meals and try and eat healthy balanced meals regularly.  [x] TRIGGERS - Look for other triggers and avoid them: Limit caffeine to 1-2 cups a day or less. Avoid dietary triggers that you have noticed bring on your headaches (this could include aged cheese, peanuts, MSG, aspartame and nitrates).  [x] EXERCISE - physical exercise as we all know is good for you in many ways, and not only is good for your heart, but also is beneficial for your mental health, cognitive health and  chronic pain/headaches. I would encourage at the least 5 days of physical exercise weekly for at least 30 minutes.     Education and Follow-up  [x] Please call with any questions or concerns. Of course if any new concerning symptoms go to the emergency department.  [x] Follow up 3-4 months to review the sleep study together, sooner if needed  If at the time of follow-up the sleep study is not completed yet certainly okay with me if you push back until after it is  Orders:    magnesium Oxide (MAG-OX) 400 mg TABS; Take 1 tablet (400 mg total) by mouth daily at bedtime

## 2025-04-23 NOTE — PROGRESS NOTES
Neurology Ambulatory Visit  Name: Nancy Jones       : 1960       MRN: [id number]   Encounter Provider: Stacy Raymond MD   Encounter Date: 2025  Encounter department: [facility]      :  Assessment & Plan  Chronic migraine without aura without status migrainosus, not intractable    Assessment/Plan:   Nancy Jones is a very pleasant  65 y.o. female with a past medical history that includes  migraines, anxiety, depression, hypertension, chronic pain syndrome on chronic opioids (tramadol), hypertensive heart disease, peripheral vascular disease, gastric ulcer without hemorrhage or perforation, GERD, mesenteric artery thrombosis, stenosis of left carotid artery status post angioplasty 2024 (followed by Cardiology and vascular surgery) allergic rhinitis, vasomotor rhinitis, Moderate CAMILLE on CPAP, (home sleep study 2020 DENZEL 14), COPD, asthma, chronic neck pain, chronic back pain, lumbar radiculopathy, arthritis, atopic dermatitis, interstitial cystitis, patellar tendinitis, hyperlipidemia, insomnia, RLS, bilateral age-related macular degeneration, angioedema, hair loss, chronic fatigue, B12 deficiency, diverticulitis, paroxysmal atrial fibrillation on Eliquis referred here for evaluation of headache.  My initial evaluation 2022    Chronic Migraine without aura and with status migrainosus, not intractable  Moderate CAMILLE not on CPAP, hypoxia (home sleep study 2020 DENZEL 14, O2 down to 67% and 12.9% of the 419 mins was below 90%) -following with sleep medicine, recommended treating due to significant morbidity and mortality related  She reports a long history of headaches and migraines that seemed to increase around perimenopause in her 40s and then again in the past few years.  As of initial visit 2022 she reports chronic daily headaches for 2-3 years and worse over the past few months, likely due to stopping using her CPAP machine for the past few months.  In  September 2022, she was hospitalized and evaluated by Neurology for stroke-like symptoms in the setting of migraine with negative MRI and symptoms lasting over 24 hours not consistent with TIA either, per inpatient neurology thought to be migraine with left-sided paresthesias.  She is on Eliquis already and also takes baby aspirin daily, and as of initial visit multiple other NSAIDs which we discussed are likely not recommended considering the Eliquis.  She reports migraines are typically left retro-orbital pressure radiating into the left temporal region and less severe on the right temporal region.  She denies aura reports typical associated migrainous features with some unilateral autonomic features, less likely trigeminal autonomic cephalalgia.  - as of 11/14/2022:  Chronic daily headache for 2-3 years, worse migraine at least 3 days a week.  Recommended she start using her CPAP machine again as this has significant morbidity and mortality not used.  Recommended she discontinue Fioricet which she is taking up to 1-4 times in a day and typically daily, we discussed gradually wean off over the next days with goal of completely getting her off as this can increase migraines.  This also has significant caffeine in it that could be impacting her sleep and other conditions such as urinary symptoms.  She is also taking daily benzos for abdominal pain and daily benzos for anxiety as well as daily tramadol and recommend she discuss these with her providers if there are other options. Trial of emgality for prevention.   - as of 4/17/2023: She chose not yet start Emgality for migraine prevention, has in her fridge.  She reports she is taking Fioricet 3-4 times a day as well as Tylenol 3 times a day and we discussed Fioricet also has Tylenol in it, she was able to discontinue other p.o. NSAIDs and other excess caffeine thankfully.  We discussed again in detail weaning off Fioricet (which I NEVER recommend for headaches and  is being prescribed by her PCP) and starting Emgality for migraine prevention.  We also discussed the significant morbidity and mortality of untreated sleep apnea and how this is likely contributing to most of her symptoms and she is awaiting follow-up sleep study and sleep medicine follow-up.  We discussed considering adding rescue medication for migraine once migraines are more episodic.  - as of 6/28/2023: She finally did her loading dose of Emgality 6/4/2023 and had possible side effects afterwards that I suspect were worsening of IIH and cervical medullary compression and therefore we will discontinue and start trial of acetazolamide/Diamox with titration up.  Decadron helped earlier this month and will refill for acutely or episodically when symptoms flare.  Ubrelvy is not helping significantly and costing her plan a lot of money.  She was able to get herself off of Fioricet and we discussed okay to take Excedrin tension episodically, although she feels it makes her interstitial cystitis worse and therefore she is taking acetaminophen.  We also discussed the morbidity and mortality of untreated sleep apnea and how that is likely what is driving the IIH and encouraged her to continue to try and use any time sleeping.   - as of 9/6/2023: She continues to have moderate sleep apnea with significant prolonged hypoxia down to 67% and 419 minutes below 90% that is untreated we discussed the morbidity and mortality if this remains untreated.  Since last visit she was hospitalized in July for ischemic bowel and ended up having a bowel resection.  She reports she had transient change in vision one night that her doctors have ordered carotid US for.  She reports during hospitalization amiodarone was added for A-fib by cardiology.  A lot of these issues can be caused or contributed to by sleep apnea and hypoxia.  She continues to have daily headaches but not as bad as they were at last visit  and she only took  acetazolamide may be 1 week or less at 125 mg as it was late to arrive and then she later was hospitalized where they discontinued it.  We discussed holding off at this time without known evidence of papilledema although again recommended following up with eye doctor for dilated eye exam as well as sleep medicine to treat the sleep apnea.  Magnesium for headache prevention which may also help with her constipation.  Continue management through other providers for her other complex medical issues.  - as of 4/23/2025: She returns 1.5 years later reporting she believes she made the follow-up when she was doing worse as far as headaches, few months ago, but thankfully now she reports she is doing better with 1-2 headaches a week and she usually takes acetaminophen (or exedrin tension) for them and that helps, takes 1 Decadron max 3 times a month and she is not taking any Fioricet which is great (felt it made it worse last headache).  We discussed getting off Fioricet is a good step, less medications including/especially steroids the better as well (due to risks) and reiterated that her untreated sleep apnea with hypoxia for 419 minutes below 90% on her last sleep study likely is playing a role in many of her medical issues even if her headaches are better.  Reviewed MRI brain ordered by other providers since last visit with her as well as nonspecific over read.  Reordered sleep study and discussed she absolutely needs to follow-up with sleep medicine for treatment, if she meets criteria for inspire, can follow-up with ENT to discuss this option.     Workup:  -Noncontrast head CT 1/22/2025: No acute intracranial abnormality.   -MRI brain without contrast 10/29/24:  White matter changes suggestive of chronic microangiopathy. No acute intracranial pathology.  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 The vessel is not clearly identified in its distal V2 and V3 segments reconstituting at the distal V3/V4 segment likely via retrograde flow. Severe focal left ICA stenosis at its origin unchanged from prior study with evidence of prior endarterectomy. Moderate focal left ICA stenosis intracranially at its ophthalmic segment.  -MRI brain without contrast 2/21/2023: 1.  No acute infarction, intracranial hemorrhage or mass effect.  2.  Mild, chronic microangiopathy.  *On my retrospective review from 6/28/2023 we discussed I do see multiple signs of increased intracranial pressure including empty sella, prominent optic nerve sheaths with tortuous optic nerves, flattened posterior sclera  - MRI brain without contrast 09/01/2022: No evidence of acute infarct, intracranial hemorrhage or mass.  - CTA head and neck with without contrast 09/01/2022: 1.  No intracranial large vessel occlusion or critical stenosis. No aneurysm.  2.  Progression of atherosclerotic disease in the left cervical carotid artery with a new linear intraluminal defect at the origin/proximal ICA suggesting an ulcerated plaque resulting in about 80% stenosis of proximal ICA.  3.  Stable atherosclerotic change of right cervical carotid artery without hemodynamically significant stenosis.  - carotid ultrasound 10/07/2022: RIGHT: There is <50% stenosis noted in the internal carotid artery. Plaque is Heterogenous. Vertebral artery flow is antegrade. There is no significant subclavian artery disease.  LEFT: There is >70% stenosis noted in the internal carotid artery.  Plaque is heterogenous. Vertebral artery flow is antegrade. There is no significant subclavian artery disease.  - MRI brain with without contrast 10/20/2018 for headache with facial paresthesias: Scattered anterior bifrontal white matter T2 and FLAIR hyperintense foci which are nonspecific but suspicious for mild chronic microangiopathic changes such as may accompany migraine headaches. Findings are stable.  No pathologic enhancement. No acute ischemic disease identified by diffusion imaging    Preventative:  - we discussed headache hygiene and lifestyle factors that may improve headaches  -  magnesium Oxide (MAG-OX) 400 mg TABS; Take 1 tablet (400 mg total) by mouth daily at bedtime. Discussed proper use, possible side effects and risks.  - Currently on through other providers:  Furosemide/Lasix 40 mg (PCP), spironolactone/Aldactone 25 mg BID (PCP), metoprolol 100 mg b.i.d. (PCP), diltiazem 120 mg  (cards, decreased from 180 mg during hospital stay she thinks and they added amiodarone), eliquis  As of 4/2025 Since last visit at some point stopped amiodarone 200 mg daily  - Past/ failed/contraindicated: ACE inhibitors anaphylaxis, valsartan side effects, Olmesartan side effects, metoprolol, Propranolol contraindicated due to history of asthma and interaction with current medications, Amitriptyline, paroxetine/paxil, bupropion/wellbutrin, citalopram/celexa, Gabapentin   - future options:  Alternative CGRP med, botox, acetazolamide    Rescue:  - discussed not taking over-the-counter or prescription pain medications more than 3 days per week to prevent medication overuse/rebound headache  - Currently on through other providers: No longer taking 3-4 Fioricet a day (none at all), (325) Tylenol, tramadol 2 tabs 0-2 times a day for stomach or bladder pain for IC, Librax chlordiazepoxide-clidinium (anti spasmodic agent/benzo) - prn abdominal pain 1-2 times a day - has not taken in a while,  ondansetron for nausea  -   Back  "up option rarely when severe: dexamethasone (DECADRON) 4 mg tablet; 1 tab PO with breakfast for 1 to 5 days for unrelenting migraine. Discussed proper use, off label use with certain meds, possible side effects and risks.  - Past/ failed/contraindicated:  Toradol has helped but she is on Eliquis and contraindicated, sumatriptan, rizatriptan she thinks helped  - past/helped: Dexamethasone 4 mg tab helped without side effects  - future options:  Could consider Triptan (no history of stroke and does not appear to have CAD), prochlorperazine,  could consider trial of 5 days of Depakote 500 mg nightly or dexamethasone 2 mg daily for prolonged migraine, ubrelvy, reyvow, nurtec      Patient instructions       Please do follow-up with eye doctor for dilated eye exam and please let me know if they see any evidence of papilledema or swelling behind your eyes.  Do not trust that they will send me the note.    Please do not drive if not seeing well that day or if not feeling cognitive delay well that day.    Please do follow-up with the sleep medicine team and treat the sleep apnea anytime you are sleeping as this is a very important issue that is likely contributing to many of your other conditions that we are medicating.  Continue to try and sleep on your side, consider zzoma pillow     ----  Do what ever you can to treat your sleep apnea as this is likely what is contributing to other chronic pain and carries with it significant morbidity and mortality if it remains untreated.     I am placing a referral for a sleep study and if it is abnormal, I will be happy to place a referral to the sleep medicine specialist team to further evaluate your sleep issues. That can be done now or anytime you would like, but you do not have to see them until after the results.    Please call [phone] OR can schedule on [employer] via \"scheduling ticket\" to schedule the sleep study and we discussed due to the new order being labeled \" " "ambulatory referral to sleep medicine\" you have to wait 2-6 days for the sleep team to turn this referral into the actual sleep study order that you can schedule, otherwise if you call now they may say there is no order for sleep study, just an order for a referral, because they may not see it yet on their end as a study order.  The referral to sleep medicine piece is only if there is abnormalities and you need to see them afterwards.    After the sleep study is completed if there is abnormal results that need treatment, I recommend you see one of the following providers in our office:  Tushar Avelar PA-C      For inspire I believe we have multiple ENTs in [facility] who do it  - Dr. Jackman at [facility]  - Dr. Hobbs       Headache/migraine treatment:   Rescue medications (for immediate treatment of a headache):   It is ok to take acetaminophen  if they help your headaches you should limit these to No more than 3 times a week to avoid medication overuse/rebound headaches.       Try Excedrin tension instead of Excedrin Migraine so that you are not adding an additional 3 aspirin to your daily aspirin and Eliquis    -     dexamethasone (DECADRON) 2 mg tablet; 1 tab PO with breakfast for 1 to 5 days for unrelenting migraine    Over the counter preventive supplements for headaches/migraines (if you try, try for 3 months straight)  (to take every day to help prevent headaches - not to take at the time of headache):  [x] Magnesium 400mg daily (If any diarrhea or upset stomach, decrease dose  as tolerated)    Prescription preventive medications for headaches/migraines   (to take every day to help prevent headaches - not to take at the time of headache):  [x]  We have options if you would like/need    *Typically these types of medications take time until you see the benefit, " although some may see improvement in days, often it may take weeks, especially if the medication is being titrated up to a beneficial level. Please contact us if there are any concerns or questions regarding the medication.     Lifestyle Recommendations:  [x] SLEEP - Maintain a regular sleep schedule: Adults need at least 7-8 hours of uninterrupted a night. Maintain good sleep hygiene:  Going to bed and waking up at consistent times, avoiding excessive daytime naps, avoiding caffeinated beverages in the evening, avoid excessive stimulation in the evening and generally using bed primarily for sleeping.  One hour before bedtime would recommend turning lights down lower, decreasing your activity (may read quietly, listen to music at a low volume). When you get into bed, should eliminate all technology (no texting, emailing, playing with your phone, iPad or tablet in bed).  [x] HYDRATION - Maintain good hydration.  Drink  2L of fluid a day (4 typical small water bottles)  [x] DIET - Maintain good nutrition. In particular don't skip meals and try and eat healthy balanced meals regularly.  [x] TRIGGERS - Look for other triggers and avoid them: Limit caffeine to 1-2 cups a day or less. Avoid dietary triggers that you have noticed bring on your headaches (this could include aged cheese, peanuts, MSG, aspartame and nitrates).  [x] EXERCISE - physical exercise as we all know is good for you in many ways, and not only is good for your heart, but also is beneficial for your mental health, cognitive health and  chronic pain/headaches. I would encourage at the least 5 days of physical exercise weekly for at least 30 minutes.     Education and Follow-up  [x] Please call with any questions or concerns. Of course if any new concerning symptoms go to the emergency department.  [x] Follow up 3-4 months to review the sleep study together, sooner if needed  If at the time of follow-up the sleep study is not completed yet certainly okay  with me if you push back until after it is  Orders:    magnesium Oxide (MAG-OX) 400 mg TABS; Take 1 tablet (400 mg total) by mouth daily at bedtime    CAMILLE (obstructive sleep apnea)    Orders:    Ambulatory Referral to Sleep Medicine; Future    Ambulatory Referral to Sleep Medicine; Future    Hypoxia             CC:   We had the pleasure of evaluating Nancy Jones in neurological consultation today. Nancy Jones presents today for evaluation of headaches.   History obtained from patient as well as available medical record review.  History of Present Illness:   Interval history as of 4/23/2025  - Of note/managed by others:   Please see EMR for details since last visit, she was asked to follow-up in 2 to 3 months and is now following up 1.5 years later  -She followed up with my neurology colleague VANDANA Castro for paresthesias, Carotid artery stenosis, cervicalgia, had EMG, CTA head and neck, followed up with vascular surgery for stenosis of left carotid artery, admitted by vascular surgery 4/29/2024 for left carotid artery stenosis and had angioplasty,  - fell in Sept and Jan, saw other providers after  -Appears MRI brain was ordered by others 10/29/2024  - She followed up with PCP, general surgery, cardiac workup in January 2024, urology for interstitial cystitis, chronic bladder pain, pelvic floor dysfunction, GI for GERD, bariatrics, orthopedics for knee pain, had herpes zoster 11/20/2024, follow-up with cardiology for CAD, dermatology for seborrheic keratosis, anesthesia for colonoscopy and EGD  4/7/25, gen surg   - no significant new or concerning neurologic symptoms since last visit reported by Adina  - diagnostics of note (please see EMR for others/details):   Of note ordered by others  -Noncontrast head CT 1/22/2025: No acute intracranial abnormality.   -MRI brain without contrast 10/29/24:  White matter changes suggestive of chronic microangiopathy. No acute intracranial pathology.  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 The vessel is not clearly identified in its distal V2 and V3 segments reconstituting at the distal V3/V4 segment likely via retrograde flow.  Severe focal left ICA stenosis at its origin unchanged from prior study with evidence of prior endarterectomy.  Moderate focal left ICA stenosis intracranially at its ophthalmic segment. ->followed by vascular     - last eye exam: vision not as clear as it was before and needs stronger glasses, he said one eye is going blind she is not sure which due to macular degeneration, left eye vision is very poor and pressure behind the eye, also told it was cataract and they want to send her to another person for prisms,     - sleep: - Moderate CAMILLE not on CPAP, hypoxia (home sleep study 12/2020 DENZEL 14, O2 down to 67% and 12.9% of the 419 mins was below 90%) -following with sleep medicine, recommended treating due to significant morbidity and mortality related  - sometimes cough/choking/gaging, witnessed apneas, snoring a lot, sleep is not always restful     Headaches and migraines   She reports she is doing better than last visit with 1-2 headaches a week and she usually takes acetaminophen for them and that helps, takes 1 Decadron max 3 times a month and she has not taken any Fioricet which is great    Preventative:    -   Trial of  magnesium Oxide (MAG-OX) 400 mg TABS; Take 1 tablet (400 mg total) by mouth daily at bedtime. Discussed proper use, possible side  effects and risks.  - Currently on through other providers:  Furosemide/Lasix 40 mg (PCP), spironolactone/Aldactone 25 mg BID (PCP), metoprolol 100 mg b.i.d. (PCP), diltiazem 120 mg  (cards, decreased from 180 mg during hospital stay she thinks and they added amiodarone), eliquis  Since last visit at some point started amiodarone 200 mg daily    Abortive:   Acetaminophen typically works  Exedrin tension   - the fioricet starting giving her headaches last month after she called him after continuous headaches for a while, got a worse headache right away   No reported bothersome side effects     From PDMP: through it up   04/07/2025 02/12/2025 2 Tramadol Hcl 50 Mg Tablet 180.00 30 Gr Tod [id number] Weg (7053) 0 60.00 MME Medicare [place]   04/01/2025 02/28/2025 2 Lorazepam 2 Mg Tablet 90.00 30 Gr Tod [id number] Weg (7053) 0 6.00 LME Medicare [place]   03/10/2025 02/12/2025 2 Tramadol Hcl 50 Mg Tablet 180.00 30 Gr Tod [id number] Weg (7053) 0 60.00 MME Medicare [place]   03/03/2025 02/28/2025 2 Lorazepam 2 Mg Tablet 78.00 26 Gr Tod [id number] Weg (7053) 0 6.00 LME Medicare [place]   02/28/2025 02/28/2025 2 Lorazepam 2 Mg Tablet 12.00 4 Gr Tod [id number] Weg (7053) 0 6.00 LME Medicare [place]   02/28/2025 02/28/2025 2 Tpxspv-Siffwlgi-Mftx -40 20.00 4 Gr Tod [id number] Weg (7053) 0 2.50 LME Medicare [place]   02/12/2025 02/12/2025 2 Tramadol Hcl 50 Mg Tablet 180.00 30 Gr Tod [id number] Weg (7053) 0 60.00 MME Medicare [place]   01/28/2025 01/28/2025 2 Lorazepam 2 Mg Tablet 90.00 30 Gr Tod [id number] Weg (7053) 0 6.00 LME Medicare [place]   01/14/2025 01/14/2025 2 Tramadol Hcl 50 Mg Tablet 180.00 30 Gr Tod [id number] Weg (7053) 0 60.00 MME Medicare [place]   12/31/2024 12/31/2024 2 Lorazepam 2 Mg Tablet 90.00 30 Gr Tod [id number] Weg (7053) 0 6.00 LME Medicare PA   12/24/2024 12/20/2024 2 Xgdebh-Uverxhlb-Xywh -40 20.00 4 Gr Tod [id number] Weg (7053) 0 2.50 LME Medicare PA   12/17/2024 12/17/2024 2 Tramadol Hcl 50 Mg Tablet 180.00 30 Gr Tod [id number] Weg (7053) 0 60.00 MME  "Medicare [place]   11/27/2024 11/27/2024 2 Lorazepam 2 Mg Tablet 90.00 30 Gr Tod [id number] Weg (7053) 0 6.00 LME Medicare PA   11/15/2024 11/15/2024 2 Tramadol Hcl 50 Mg Tablet 180.00 30 Ni Tat [id number] Weg (7053) 0 60.00 MME Medicare [place]   10/18/2024 10/18/2024 2 Tramadol Hcl 50 Mg Tablet 180.00                Please see previous notes/EMR for details from previous visits.     Objective     Physical Exam:                                                                 Vitals:            Constitutional:    /90 (BP Location: Right arm, Patient Position: Sitting, Cuff Size: Standard)   Pulse 76   Temp 98.2 °F (36.8 °C) (Temporal)   Ht 5' 4\" (1.626 m)   Wt 94.8 kg (209 lb)   LMP  (LMP Unknown)   BMI 35.87 kg/m²   BP Readings from Last 3 Encounters:   04/23/25 158/90   04/07/25 141/75   04/04/25 158/80     Pulse Readings from Last 3 Encounters:   04/23/25 76   04/07/25 63   04/04/25 62         Well developed, well nourished, well groomed.        Psychiatric:  Normal behavior and appropriate affect        Able to answer questions appropriately, provide history of recent events   Normal language and spontaneous speech.  facial expression symmetric  symmetric bulk throughout. no atrophy, fasciculations or significant abnormal movements noted during our visit from observation.   steady casual gait           Administrative Statements   I have spent a total time of 70 minutes in caring for this patient on the day of the visit/encounter including Prognosis, Risks and benefits of tx options, Instructions for management, Patient education, Importance of tx compliance, Risk factor reductions, Impressions, Counseling / Coordination of care, Documenting in the medical record, Obtaining or reviewing history  , and Communicating with other healthcare professionals .      "

## 2025-04-23 NOTE — PATIENT INSTRUCTIONS
"Please do follow-up with eye doctor for dilated eye exam and please let me know if they see any evidence of papilledema or swelling behind your eyes.  Do not trust that they will send me the note.    Please do not drive if not seeing well that day or if not feeling cognitive delay well that day.    Please do follow-up with the sleep medicine team and treat the sleep apnea anytime you are sleeping as this is a very important issue that is likely contributing to many of your other conditions that we are medicating.  Continue to try and sleep on your side, consider zzoma pillow     ----  Do what ever you can to treat your sleep apnea as this is likely what is contributing to other chronic pain and carries with it significant morbidity and mortality if it remains untreated.     -If you feel like you could sleep on your back that night only, certainly could try to sleep on your back for the sleep study, but not if you feel like you cannot sleep this way.  Just getting sleep is the most important thing, as the machine thinks you are sleeping the entire time it is plugged in. Otherwise, we discussed home sleep study can underestimate the problem.  If it comes back that you almost have sleep apnea or other sleep disorder, but not meeting criteria, we could consider in lab study and reach out to me if you would like this ordered before next visit.    I am placing a referral for a sleep study and if it is abnormal, I will be happy to place a referral to the sleep medicine specialist team to further evaluate your sleep issues. That can be done now or anytime you would like, but you do not have to see them until after the results.    Please call [phone] OR can schedule on [employer] via \"scheduling ticket\" to schedule the sleep study and we discussed due to the new order being labeled \" ambulatory referral to sleep medicine\" you have to wait 2-6 days for the sleep team to turn this referral into the actual sleep study " order that you can schedule, otherwise if you call now they may say there is no order for sleep study, just an order for a referral, because they may not see it yet on their end as a study order.  The referral to sleep medicine piece is only if there is abnormalities and you need to see them afterwards.    After the sleep study is completed if there is abnormal results that need treatment, I recommend you see one of the following providers in our office:  Tushar Avelar PA-C      For inspire I believe we have multiple ENTs in [facility] who do it  - Dr. Jackman at [facility]  - Dr. Hobbs         Headache/migraine treatment:   Rescue medications (for immediate treatment of a headache):   It is ok to take acetaminophen  if they help your headaches you should limit these to No more than 3 times a week to avoid medication overuse/rebound headaches.       Try Excedrin tension instead of Excedrin Migraine so that you are not adding an additional 3 aspirin to your daily aspirin and Eliquis    -     dexamethasone (DECADRON) 2 mg tablet; 1 tab PO with breakfast for 1 to 5 days for unrelenting migraine    Over the counter preventive supplements for headaches/migraines (if you try, try for 3 months straight)  (to take every day to help prevent headaches - not to take at the time of headache):  [x] Magnesium 400mg daily (If any diarrhea or upset stomach, decrease dose  as tolerated)    Prescription preventive medications for headaches/migraines   (to take every day to help prevent headaches - not to take at the time of headache):  [x]  We have options if you would like    *Typically these types of medications take time until you see the benefit, although some may see improvement in days, often it may take weeks, especially if the medication is being titrated up to a beneficial level.  Please contact us if there are any concerns or questions regarding the medication.     Lifestyle Recommendations:  [x] SLEEP - Maintain a regular sleep schedule: Adults need at least 7-8 hours of uninterrupted a night. Maintain good sleep hygiene:  Going to bed and waking up at consistent times, avoiding excessive daytime naps, avoiding caffeinated beverages in the evening, avoid excessive stimulation in the evening and generally using bed primarily for sleeping.  One hour before bedtime would recommend turning lights down lower, decreasing your activity (may read quietly, listen to music at a low volume). When you get into bed, should eliminate all technology (no texting, emailing, playing with your phone, iPad or tablet in bed).  [x] HYDRATION - Maintain good hydration.  Drink  2L of fluid a day (4 typical small water bottles)  [x] DIET - Maintain good nutrition. In particular don't skip meals and try and eat healthy balanced meals regularly.  [x] TRIGGERS - Look for other triggers and avoid them: Limit caffeine to 1-2 cups a day or less. Avoid dietary triggers that you have noticed bring on your headaches (this could include aged cheese, peanuts, MSG, aspartame and nitrates).  [x] EXERCISE - physical exercise as we all know is good for you in many ways, and not only is good for your heart, but also is beneficial for your mental health, cognitive health and  chronic pain/headaches. I would encourage at the least 5 days of physical exercise weekly for at least 30 minutes.     Education and Follow-up  [x] Please call with any questions or concerns. Of course if any new concerning symptoms go to the emergency department.  [x] Follow up 3-4 months to review the sleep study together, sooner if needed  If at the time of follow-up the sleep study is not completed yet certainly okay with me if you push back until after it is

## 2025-04-23 NOTE — ASSESSMENT & PLAN NOTE
Orders:    Ambulatory Referral to Sleep Medicine; Future    Ambulatory Referral to Sleep Medicine; Future

## 2025-04-24 ENCOUNTER — RESULTS FOLLOW-UP (OUTPATIENT)
Dept: UROLOGY | Facility: CLINIC | Age: 65
End: 2025-04-24

## 2025-04-24 DIAGNOSIS — N30.00 ACUTE CYSTITIS WITHOUT HEMATURIA: Primary | ICD-10-CM

## 2025-04-24 DIAGNOSIS — B37.9 YEAST INFECTION: ICD-10-CM

## 2025-04-24 DIAGNOSIS — N39.0 RECURRENT UTI: ICD-10-CM

## 2025-04-24 RX ORDER — LEVOFLOXACIN 500 MG/1
500 TABLET, FILM COATED ORAL EVERY 24 HOURS
Qty: 7 TABLET | Refills: 0 | Status: SHIPPED | OUTPATIENT
Start: 2025-04-24 | End: 2025-05-01

## 2025-04-24 NOTE — TELEPHONE ENCOUNTER
----- Message from JUD Torre sent at 4/24/2025  7:32 AM EDT -----  Please let patient know that her urine culture was positive.  I sent a prescription for Levaquin to her pharmacy.  She should hold her magnesium as well as Carafate while taking.

## 2025-04-24 NOTE — RESULT ENCOUNTER NOTE
Please let patient know that her urine culture was positive.  I sent a prescription for Levaquin to her pharmacy.  She should hold her magnesium as well as Carafate while taking.

## 2025-04-25 NOTE — TELEPHONE ENCOUNTER
Pt calling back.  See 4/21/25 triage note.  Pt would like to know if Sucralfate can be increased to help with abd pain and burning.     Please advise.

## 2025-04-25 NOTE — TELEPHONE ENCOUNTER
Reviewed recommendations per NANNETTE Griggs. Pt verbalized understanding. She is currently taking Carafate BID and will increase to QID. If this does not help she will try liquid Maalox and increase her omeprazole to to twice daily.

## 2025-04-27 PROBLEM — N30.00 ACUTE CYSTITIS: Status: RESOLVED | Noted: 2025-03-28 | Resolved: 2025-04-27

## 2025-04-29 ENCOUNTER — TELEPHONE (OUTPATIENT)
Dept: UROLOGY | Facility: CLINIC | Age: 65
End: 2025-04-29

## 2025-04-29 ENCOUNTER — APPOINTMENT (OUTPATIENT)
Dept: LAB | Facility: HOSPITAL | Age: 65
End: 2025-04-29
Payer: COMMERCIAL

## 2025-04-29 ENCOUNTER — OFFICE VISIT (OUTPATIENT)
Dept: SURGERY | Facility: CLINIC | Age: 65
End: 2025-04-29
Payer: COMMERCIAL

## 2025-04-29 ENCOUNTER — TELEPHONE (OUTPATIENT)
Age: 65
End: 2025-04-29

## 2025-04-29 ENCOUNTER — TRANSCRIBE ORDERS (OUTPATIENT)
Dept: SLEEP CENTER | Facility: CLINIC | Age: 65
End: 2025-04-29

## 2025-04-29 VITALS
SYSTOLIC BLOOD PRESSURE: 140 MMHG | WEIGHT: 209 LBS | TEMPERATURE: 96.7 F | HEIGHT: 64 IN | DIASTOLIC BLOOD PRESSURE: 69 MMHG | OXYGEN SATURATION: 94 % | BODY MASS INDEX: 35.68 KG/M2 | HEART RATE: 66 BPM

## 2025-04-29 DIAGNOSIS — E66.01 OBESITY, MORBID (HCC): ICD-10-CM

## 2025-04-29 DIAGNOSIS — K43.2 INCISIONAL HERNIA, WITHOUT OBSTRUCTION OR GANGRENE: Primary | ICD-10-CM

## 2025-04-29 DIAGNOSIS — K43.2 INCISIONAL HERNIA, WITHOUT OBSTRUCTION OR GANGRENE: ICD-10-CM

## 2025-04-29 DIAGNOSIS — I48.91 A-FIB (HCC): ICD-10-CM

## 2025-04-29 DIAGNOSIS — E89.1 POSTPROCEDURAL HYPOINSULINEMIA: ICD-10-CM

## 2025-04-29 DIAGNOSIS — G43.711 INTRACTABLE CHRONIC MIGRAINE WITHOUT AURA AND WITH STATUS MIGRAINOSUS: ICD-10-CM

## 2025-04-29 DIAGNOSIS — G47.33 OSA (OBSTRUCTIVE SLEEP APNEA): Primary | ICD-10-CM

## 2025-04-29 LAB
ALBUMIN SERPL BCG-MCNC: 4.6 G/DL (ref 3.5–5)
ALP SERPL-CCNC: 98 U/L (ref 34–104)
ALT SERPL W P-5'-P-CCNC: 12 U/L (ref 7–52)
ANION GAP SERPL CALCULATED.3IONS-SCNC: 10 MMOL/L (ref 4–13)
AST SERPL W P-5'-P-CCNC: 13 U/L (ref 13–39)
BASOPHILS # BLD AUTO: 0.06 THOUSANDS/ÂΜL (ref 0–0.1)
BASOPHILS NFR BLD AUTO: 1 % (ref 0–1)
BILIRUB SERPL-MCNC: 0.55 MG/DL (ref 0.2–1)
BUN SERPL-MCNC: 24 MG/DL (ref 5–25)
CALCIUM SERPL-MCNC: 9.1 MG/DL (ref 8.4–10.2)
CHLORIDE SERPL-SCNC: 101 MMOL/L (ref 96–108)
CO2 SERPL-SCNC: 27 MMOL/L (ref 21–32)
CREAT SERPL-MCNC: 0.94 MG/DL (ref 0.6–1.3)
EOSINOPHIL # BLD AUTO: 0.24 THOUSAND/ÂΜL (ref 0–0.61)
EOSINOPHIL NFR BLD AUTO: 2 % (ref 0–6)
ERYTHROCYTE [DISTWIDTH] IN BLOOD BY AUTOMATED COUNT: 12.1 % (ref 11.6–15.1)
EST. AVERAGE GLUCOSE BLD GHB EST-MCNC: 151 MG/DL
GFR SERPL CREATININE-BSD FRML MDRD: 62 ML/MIN/1.73SQ M
GLUCOSE SERPL-MCNC: 129 MG/DL (ref 65–140)
HBA1C MFR BLD: 6.9 %
HCT VFR BLD AUTO: 40 % (ref 34.8–46.1)
HGB BLD-MCNC: 13.4 G/DL (ref 11.5–15.4)
IMM GRANULOCYTES # BLD AUTO: 0.07 THOUSAND/UL (ref 0–0.2)
IMM GRANULOCYTES NFR BLD AUTO: 1 % (ref 0–2)
LYMPHOCYTES # BLD AUTO: 2.83 THOUSANDS/ÂΜL (ref 0.6–4.47)
LYMPHOCYTES NFR BLD AUTO: 26 % (ref 14–44)
MCH RBC QN AUTO: 30.1 PG (ref 26.8–34.3)
MCHC RBC AUTO-ENTMCNC: 33.5 G/DL (ref 31.4–37.4)
MCV RBC AUTO: 90 FL (ref 82–98)
MONOCYTES # BLD AUTO: 0.78 THOUSAND/ÂΜL (ref 0.17–1.22)
MONOCYTES NFR BLD AUTO: 7 % (ref 4–12)
NEUTROPHILS # BLD AUTO: 7 THOUSANDS/ÂΜL (ref 1.85–7.62)
NEUTS SEG NFR BLD AUTO: 63 % (ref 43–75)
NRBC BLD AUTO-RTO: 0 /100 WBCS
PLATELET # BLD AUTO: 297 THOUSANDS/UL (ref 149–390)
PMV BLD AUTO: 9.1 FL (ref 8.9–12.7)
POTASSIUM SERPL-SCNC: 3.4 MMOL/L (ref 3.5–5.3)
PROT SERPL-MCNC: 7.1 G/DL (ref 6.4–8.4)
RBC # BLD AUTO: 4.45 MILLION/UL (ref 3.81–5.12)
SODIUM SERPL-SCNC: 138 MMOL/L (ref 135–147)
WBC # BLD AUTO: 10.98 THOUSAND/UL (ref 4.31–10.16)

## 2025-04-29 PROCEDURE — 36415 COLL VENOUS BLD VENIPUNCTURE: CPT

## 2025-04-29 PROCEDURE — 80053 COMPREHEN METABOLIC PANEL: CPT

## 2025-04-29 PROCEDURE — 85025 COMPLETE CBC W/AUTO DIFF WBC: CPT

## 2025-04-29 PROCEDURE — 83036 HEMOGLOBIN GLYCOSYLATED A1C: CPT

## 2025-04-29 PROCEDURE — 99213 OFFICE O/P EST LOW 20 MIN: CPT | Performed by: SURGERY

## 2025-04-29 RX ORDER — SODIUM CHLORIDE, SODIUM LACTATE, POTASSIUM CHLORIDE, CALCIUM CHLORIDE 600; 310; 30; 20 MG/100ML; MG/100ML; MG/100ML; MG/100ML
125 INJECTION, SOLUTION INTRAVENOUS CONTINUOUS
OUTPATIENT
Start: 2025-05-05

## 2025-04-29 RX ORDER — CEFAZOLIN SODIUM 2 G/50ML
2000 SOLUTION INTRAVENOUS ONCE
OUTPATIENT
Start: 2025-05-05 | End: 2025-05-05

## 2025-04-29 RX ORDER — FLUCONAZOLE 200 MG/1
200 TABLET ORAL
Qty: 2 TABLET | Refills: 0 | Status: SHIPPED | OUTPATIENT
Start: 2025-04-29 | End: 2025-05-03

## 2025-04-29 NOTE — TELEPHONE ENCOUNTER
Left detailed message of JUD RODRIGUEZ note result on urine culture per communication consent form and if any questions to contact the office

## 2025-04-29 NOTE — TELEPHONE ENCOUNTER
----- Message from JUD Torre sent at 4/29/2025  9:52 AM EDT -----  I sent a prescription fluconazole for reported yeast infection.  Patient likely has residual inflammation from infection.  I do not recommend repeat urine testing at this time.  If her symptoms persist after 72 hours after completion of antibiotics then we can consider repeat urine testing.

## 2025-04-29 NOTE — PROGRESS NOTES
Name: Nancy Jones      : 1960      MRN: [id number]  Encounter Provider: Jewel Rankin MD  Encounter Date: 2025   Encounter department: [facility] GENERAL SURGERY [facility]  :  Assessment & Plan  Incisional hernia, without obstruction or gangrene  She has multiple fascial defects extending throughout the length of her abdominal incision.  Will plan for an open approach with a retrorectus repair using absorbable Phasix mesh.  There is a possibility she may need a unilateral or bilateral component separation.  The risks benefits alternatives were explained to her she is agreeable to proceed    Orders:    Case request operating room: REPAIR HERNIA INCISIONAL; Standing    CBC and differential; Future    Basic metabolic panel; Future    Comprehensive metabolic panel; Future    HEMOGLOBIN A1C W/ EAG ESTIMATION; Future    Postprocedural hypoinsulinemia    Orders:    HEMOGLOBIN A1C W/ EAG ESTIMATION; Future    A-fib (HCC)  Will need to hold her Eliquis for 2 days before surgery.         Obesity, morbid (HCC)             History of Present Illness   Nancy Jones is a 65 y.o. female who presents for evaluation of incisional hernia.  She has a history of and emergency exploratory laparotomy resection of her transverse colon down to sigmoid due to ischemia.  She unfortunate developed incisional hernias.  She was seen and tentatively scheduled in the past for this but had to reschedule and she is now here to resume care.  She does have some fullness and discomfort along her midline.  She does have some constipation symptoms and can get back to very easily and have to almost push things through.  She has some crampy pain across her abdomen to the right as well.  She also been having increasing acid reflux type symptoms.    2025 she is here for reevaluation and and final discussion prior to her surgery next week.    HPI  Review of Systems as per HPI.  Past Medical History   Past Medical History:   Diagnosis  "Date    A-fib ([facility]) 03/2020    Allergic     Anxiety     Arthritis     Asthma     Back pain     and muscle pain    Bruises easily     Chronic pain disorder     Interstitial pain    Colon polyp     Dental bridge present     Depression     Eczema     GERD (gastroesophageal reflux disease)     Heart murmur     History of blood clots     per pt \"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\"    History of pneumonia     Hives     Hyperlipidemia     Hypertension     Infertility, female     Interstitial cystitis     Migraine     sees neurologist    Motion sickness     Obesity     Palpitations     PONV (postoperative nausea and vomiting)     Premature atrial contractions 11/09/2022    Psychiatric disorder     TIA (transient ischemic attack) 09/2022    per pt --\"had MRI and 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\"    Urinary incontinence     wears Pads    Venous insufficiency 08/01/2017    Wears glasses      Past Surgical History:   Procedure Laterality Date    COLONOSCOPY      DILATION AND CURETTAGE OF UTERUS      EGD      EXPLORATORY LAPAROTOMY      for infertility    EXPLORATORY LAPAROTOMY W/ BOWEL RESECTION N/A 7/19/2023    Procedure: LAPAROTOMY EXPLORATORY W/ BOWEL RESECTION;  Surgeon: Crista Recinos MD;  Location: [facility];  Service: General    HAND SURGERY      Hand excision of tendon cyst    WY LAPAROSCOPIC APPENDECTOMY N/A 05/03/2022    Procedure: APPENDECTOMY LAPAROSCOPIC;  Surgeon: Vin Fields MD;  Location:  MAIN OR;  Service: General    WY LAPS ABD PRTM&OMENTUM DX W/WO SPEC BR/WA SPX N/A 7/19/2023    Procedure: LAPAROSCOPY DIAGNOSTIC, LYSIS OF ADHESIONS, LAPAROSCOPY TAKE TAKEDOWN OF LEFT COLON, EXPLORATORY LAPAROSCOPY, LYSIS OF ADHESIONS, RESECTION OF TRANSVERSE COLON SPLENIC FLEXURE AND DESCENDING COLON , TAKE DOWN OF SPLENIC FLEXURE, SIGMOIDOSCOPY, MOBILIZATION OF RIGHT COLON, PRIMARY ANASTOMOSIS EEA 29, TAP BLOCK;  Surgeon: Crista Recinos MD;  Location: [facility];  Service: General    WY " TCAT IV STENT CRV CRTD ART EMBOLIC PROTECJ Left 4/29/2024    Procedure: ANGIOPLASTY ARTERY CAROTID W/ STENT;  Surgeon: Roberto García DO;  Location: [facility];  Service: Vascular    NJ TEAEC W/PATCH GRF CAROTID VERTB SUBCLAV NECK INC Left 1/31/2023    Procedure: EXPLORATION OF LEFT CAROTID ARTERY; ABORTED ENDARTERECTOMY ARTERY CAROTID;  Surgeon: Roberto García DO;  Location: [facility];  Service: Vascular    SUPERIOR MESTENTERIC ARTERY STENT      WISDOM TOOTH EXTRACTION       Family History   Problem Relation Age of Onset    Lung cancer Mother 60    Brain cancer Mother 60    Diabetes Father     Depression Father     Other Father         septic    Allergies Sister     Hashimoto's thyroiditis Sister     Abdominal aortic aneurysm Sister     Diabetes Sister     No Known Problems Sister     No Known Problems Maternal Grandmother     No Known Problems Maternal Grandfather     No Known Problems Paternal Grandmother     No Known Problems Paternal Grandfather     Hodgkin's lymphoma Brother     No Known Problems Brother     No Known Problems Maternal Aunt     Diabetes Other     Breast cancer Neg Hx       reports that she quit smoking about 6 years ago. Her smoking use included cigarettes. She started smoking about 16 years ago. She has a 5 pack-year smoking history. She has been exposed to tobacco smoke. She has never used smokeless tobacco. She reports that she does not currently use alcohol. She reports that she does not use drugs.  Current Outpatient Medications   Medication Instructions    acetaminophen (TYLENOL) 650 mg, Oral, Every 6 hours scheduled    apixaban (ELIQUIS) 5 mg, Oral, 2 times daily    butalbital-acetaminophen-caffeine (FIORICET,ESGIC) -40 mg per tablet 1 tablet, Oral, Every 4 hours PRN    chlordiazepoxide-clidinium (LIBRAX) 5-2.5 mg per capsule TAKE 1 CAPSULE BY MOUTH TWO TIMES DAILY AS NEEDED FOR INDIGESTION    chlorhexidine (PERIDEX) 0.12 % solution 15 mL, Mouth/Throat, 2 times daily     dexamethasone (DECADRON) 2 mg tablet One tab with breakfast (early in day to minimize impact on sleep, with food for stomach protection) for 1-5 days for unrelenting migraine    Diclofenac Sodium (VOLTAREN) 2 g, Topical, 4 times daily    diltiazem (CARDIZEM CD) 240 mg, Oral, Daily    Docusate Sodium (COLACE PO) Daily at bedtime    ezetimibe (ZETIA) 10 mg, Oral, Daily    famciclovir (FAMVIR) 500 mg, Oral, 3 times daily    fexofenadine (ALLEGRA) 180 mg, Daily    fluconazole (DIFLUCAN) 200 mg, Oral, Every 72 hours    fluticasone-vilanterol (Breo Ellipta) 200-25 mcg/actuation inhaler 1 puff, Inhalation, Daily, Rinse mouth after use.    furosemide (LASIX) 40 mg, Oral, Daily    levofloxacin (LEVAQUIN) 500 mg, Oral, Every 24 hours    LORazepam (ATIVAN) 1 mg, Oral, 3 times daily    LORazepam (ATIVAN) 2 mg, Oral, Every 8 hours PRN    lubiprostone (AMITIZA) 8 mcg, Oral, 2 times daily with meals    magnesium Oxide (MAG-OX) 400 mg, Oral, Daily at bedtime    Meth-Hyo-M Bl-Na Phos-Ph Sal (Uribel) 118 MG CAPS 118 mg, Oral, 3 times daily    methocarbamol (ROBAXIN) 1,000 mg, Oral, 3 times daily    metoprolol succinate (TOPROL-XL) 100 mg, Oral, 2 times daily    omeprazole (PRILOSEC) 40 mg, Oral, Daily    ondansetron (ZOFRAN-ODT) 4 mg, Oral, Every 8 hours PRN    phenazopyridine (PYRIDIUM) 200 mg, Oral, 3 times daily with meals    phenazopyridine (PYRIDIUM) 200 mg, Oral, 3 times daily with meals    polyethylene glycol (Golytely) 4000 mL solution 4,000 mL, Oral, Once, Take 4000 mL by mouth once for 1 dose. Use as directed    polyethylene glycol (MIRALAX) 238 g, Oral, Once, Take 238 g my mouth. Use as directed    polyethylene glycol (MIRALAX) 17 g, Oral, 2 times daily    promethazine (PHENERGAN) 25 mg tablet TAKE 1 TABLET BY MOUTH EVERY 6 HOURS AS NEEDED FOR NAUSEA AND/OR VOMITING    Repatha SureClick 140 mg, Subcutaneous, Every 14 days    spironolactone (ALDACTONE) 25 mg, Oral, 2 times daily    sucralfate (CARAFATE) 1 g, Oral, 4 times  "daily    traMADol (ULTRAM) 100 mg, Oral, Every 8 hours PRN    triamcinolone (KENALOG) 0.1 % ointment Topical, 2 times daily, START applying triamcinolone 0.1% ointment to the affected area twice daily for up to 2 weeks for flares. Do not use for more than 2 weeks.     Allergies   Allergen Reactions    Ace Inhibitors Angioedema and Anaphylaxis     Anaphylaxis    Benicar [Olmesartan] Angioedema     See Allergy note from 9/11/2008.  Swollen ankles/legs    Hydralazine Other (See Comments)     Lip swelling  Flank pain    Other Anaphylaxis and Other (See Comments)     Preservatives- itching throat closes, hi  E Z Cat scan contrast - hives throat closes itching,  Preservatives- itching throat closes, hi  Artificial sweeteners    Valsartan Angioedema     Lips, face swollen    Ampicillin Hives     Depends on brand some preservatives can react. Has recently tolerated oral amoxicillin.    Sulfa Antibiotics Hives     stuffiness,itching,hives,throat closing--per pt \"sometimes able to take depending on the brand and the filler or possibly preservatives in it\"    Motrin [Ibuprofen] Other (See Comments)     Per pt \" told not to take due to A Fib\"    Wound Dressing Adhesive Other (See Comments)     Per pt Adhesive on EKG leads caused redness      Current Outpatient Medications on File Prior to Visit   Medication Sig Dispense Refill    acetaminophen (TYLENOL) 325 mg tablet Take 2 tablets (650 mg total) by mouth every 6 (six) hours  0    apixaban (Eliquis) 5 mg Take 1 tablet (5 mg total) by mouth 2 (two) times a day 180 tablet 3    butalbital-acetaminophen-caffeine (FIORICET,ESGIC) -40 mg per tablet Take 1 tablet by mouth every 4 (four) hours as needed for headaches (Patient not taking: Reported on 4/23/2025) 20 tablet 0    chlordiazepoxide-clidinium (LIBRAX) 5-2.5 mg per capsule TAKE 1 CAPSULE BY MOUTH TWO TIMES DAILY AS NEEDED FOR INDIGESTION 60 capsule 2    chlorhexidine (PERIDEX) 0.12 % solution Apply 15 mL to the mouth or " throat 2 (two) times a day 120 mL 0    dexamethasone (DECADRON) 2 mg tablet One tab with breakfast (early in day to minimize impact on sleep, with food for stomach protection) for 1-5 days for unrelenting migraine 5 tablet 0    Diclofenac Sodium (VOLTAREN) 1 % Apply 2 g topically 4 (four) times a day 100 g 1    diltiazem (CARDIZEM CD) 240 mg 24 hr capsule Take 1 capsule (240 mg total) by mouth daily 100 capsule 0    Docusate Sodium (COLACE PO) Take by mouth daily at bedtime      Evolocumab (Repatha SureClick) 140 MG/ML SOAJ Inject 1 mL (140 mg total) under the skin every 14 (fourteen) days 6 mL 5    ezetimibe (ZETIA) 10 mg tablet Take 1 tablet (10 mg total) by mouth daily (Patient not taking: Reported on 4/23/2025) 30 tablet 5    famciclovir (FAMVIR) 500 mg tablet Take 1 tablet (500 mg total) by mouth 3 (three) times a day for 7 days (Patient not taking: Reported on 2/26/2025) 21 tablet 0    fexofenadine (ALLEGRA) 180 MG tablet Take 180 mg by mouth daily (Patient not taking: Reported on 2/26/2025)      fluconazole (DIFLUCAN) 200 mg tablet Take 1 tablet (200 mg total) by mouth every third day for 2 doses 2 tablet 0    fluticasone-vilanterol (Breo Ellipta) 200-25 mcg/actuation inhaler Inhale 1 puff daily Rinse mouth after use. 200 blister 1    furosemide (LASIX) 40 mg tablet Take 1 tablet (40 mg total) by mouth daily 90 tablet 1    levofloxacin (LEVAQUIN) 500 mg tablet Take 1 tablet (500 mg total) by mouth every 24 hours for 7 days 7 tablet 0    LORazepam (ATIVAN) 1 mg tablet Take 1 tablet (1 mg total) by mouth 3 (three) times a day (Patient not taking: Reported on 4/4/2025) 90 tablet 0    LORazepam (ATIVAN) 2 mg tablet Take 1 tablet (2 mg total) by mouth every 8 (eight) hours as needed for anxiety 90 tablet 5    lubiprostone (AMITIZA) 8 mcg capsule Take 1 capsule (8 mcg total) by mouth 2 (two) times a day with meals 60 capsule 2    magnesium Oxide (MAG-OX) 400 mg TABS Take 1 tablet (400 mg total) by mouth daily at  bedtime 90 tablet 4    Meth-Hyo-M Bl-Na Phos-Ph Sal (Uribel) 118 MG CAPS Take 1 capsule (118 mg total) by mouth 3 (three) times a day (Patient not taking: Reported on 4/23/2025) 90 capsule 3    methocarbamol (ROBAXIN) 500 mg tablet TAKE TWO TABLETS BY MOUTH THREE TIMES A  tablet 2    metoprolol succinate (TOPROL-XL) 100 mg 24 hr tablet Take 1 tablet (100 mg total) by mouth 2 (two) times a day 180 tablet 1    omeprazole (PriLOSEC) 40 MG capsule Take 1 capsule (40 mg total) by mouth daily 90 capsule 1    ondansetron (ZOFRAN-ODT) 4 mg disintegrating tablet Take 1 tablet (4 mg total) by mouth every 8 (eight) hours as needed for nausea 21 tablet 5    phenazopyridine (PYRIDIUM) 200 mg tablet Take 1 tablet (200 mg total) by mouth 3 (three) times a day with meals 10 tablet 0    phenazopyridine (PYRIDIUM) 200 mg tablet Take 1 tablet (200 mg total) by mouth 3 (three) times a day with meals 90 tablet 5    polyethylene glycol (Golytely) 4000 mL solution Take 4,000 mL by mouth once for 1 dose Take 4000 mL by mouth once for 1 dose. Use as directed 4000 mL 0    polyethylene glycol (MIRALAX) 17 g packet Take 17 g by mouth 2 (two) times a day 1020 g 2    polyethylene glycol (MiraLax) 17 GM/SCOOP powder Take 238 g by mouth once for 1 dose Take 238 g my mouth. Use as directed 238 g 0    promethazine (PHENERGAN) 25 mg tablet TAKE 1 TABLET BY MOUTH EVERY 6 HOURS AS NEEDED FOR NAUSEA AND/OR VOMITING 120 tablet 0    spironolactone (ALDACTONE) 25 mg tablet Take 1 tablet (25 mg total) by mouth 2 (two) times a day 180 tablet 1    sucralfate (CARAFATE) 1 g tablet Take 1 tablet (1 g total) by mouth 4 (four) times a day 120 tablet 5    traMADol (ULTRAM) 50 mg tablet Take 2 tablets (100 mg total) by mouth every 8 (eight) hours as needed for moderate pain 180 tablet 5    triamcinolone (KENALOG) 0.1 % ointment Apply topically 2 (two) times a day START applying triamcinolone 0.1% ointment to the affected area twice daily for up to 2 weeks  "for flares. Do not use for more than 2 weeks. 80 g 3     Current Facility-Administered Medications on File Prior to Visit   Medication Dose Route Frequency Provider Last Rate Last Admin    cyanocobalamin injection 1,000 mcg  1,000 mcg Intramuscular Q30 Days Coy Ayoub DO   1,000 mcg at 25 1325      Social History     Tobacco Use    Smoking status: Former     Current packs/day: 0.00     Average packs/day: 0.5 packs/day for 10.0 years (5.0 ttl pk-yrs)     Types: Cigarettes     Start date:      Quit date: 2019     Years since quittin.3     Passive exposure: Past    Smokeless tobacco: Never   Vaping Use    Vaping status: Never Used   Substance and Sexual Activity    Alcohol use: Not Currently    Drug use: No    Sexual activity: Not Currently     Comment: defer        Objective   /69 (BP Location: Right arm, Patient Position: Sitting, Cuff Size: Standard)   Pulse 66   Temp (!) 96.7 °F (35.9 °C) (Temporal)   Ht 5' 4\" (1.626 m)   Wt 94.8 kg (209 lb)   LMP  (LMP Unknown)   SpO2 94%   BMI 35.87 kg/m²      Physical Exam  Vitals and nursing note reviewed.   Constitutional:       General: She is not in acute distress.     Appearance: She is well-developed. She is not diaphoretic.   HENT:      Head: Normocephalic and atraumatic.   Eyes:      Pupils: Pupils are equal, round, and reactive to light.   Cardiovascular:      Rate and Rhythm: Normal rate.   Pulmonary:      Effort: Pulmonary effort is normal. No respiratory distress.   Abdominal:      General: Bowel sounds are normal.      Palpations: Abdomen is soft.      Comments: Large midline scar.  Several incisional hernias with the largest being at the umbilicus into the left.   Musculoskeletal:         General: Normal range of motion.      Cervical back: Normal range of motion and neck supple.   Skin:     General: Skin is warm and dry.   Neurological:      Mental Status: She is alert and oriented to person, place, and time.   Psychiatric:         " Behavior: Behavior normal.           Radiology Results Review: I personally reviewed the following image studies in PACS and associated radiology reports: CT abdomen/pelvis. My interpretation of the radiology images/reports is: Multiple fascial defects starting high near the xiphoid with the edge of the liver fossa protruding all way down to both umbilicus.  There is good fascia remaining inferiorly.  The most inferior hernia is containing loops of small bowel..

## 2025-04-29 NOTE — ASSESSMENT & PLAN NOTE
She has multiple fascial defects extending throughout the length of her abdominal incision.  Will plan for an open approach with a retrorectus repair using absorbable Phasix mesh.  There is a possibility she may need a unilateral or bilateral component separation.  The risks benefits alternatives were explained to her she is agreeable to proceed    Orders:    Case request operating room: REPAIR HERNIA INCISIONAL; Standing    CBC and differential; Future    Basic metabolic panel; Future    Comprehensive metabolic panel; Future    HEMOGLOBIN A1C W/ EAG ESTIMATION; Future

## 2025-04-29 NOTE — RESULT ENCOUNTER NOTE
I sent a prescription fluconazole for reported yeast infection.  Patient likely has residual inflammation from infection.  I do not recommend repeat urine testing at this time.  If her symptoms persist after 72 hours after completion of antibiotics then we can consider repeat urine testing.

## 2025-04-29 NOTE — TELEPHONE ENCOUNTER
Irma levy [facility] pharmacy called to find out if the medication fluconazole (DIFLUCAN) 200 mg tablet would counter act the other medications that she is taking and I look in her chart and explained to the pharmacist that she has been on this medication before and she has never had any reaction to this medication affecting her other medications and she thanked me and hung up

## 2025-04-29 NOTE — TELEPHONE ENCOUNTER
Patient called in stating she is on her last day of Levaquin and is continuing to experience UTI symptoms. She is still having burning sensation, frequency, urgency, abdominal bloating, bladder pain. She is wondering if she should have more urine testing or if she can be prescribed medications. She is also requesting medication for a yeast infection. Please advise.

## 2025-04-29 NOTE — TELEPHONE ENCOUNTER
FOLLOW UP: Please review and advise. Requiring call back to patient.     REASON FOR CONVERSATION: Medication Question  Recd call from patient stating that she would like to know which brand and/or grade of magnesium Oxide pt should get. Pt stated that she spoke with the Pharmacist today and was not able to get a definitive answer on which OTC Mag Oxide pt should purchase.     Pt also asking if a script for Dexamethasone can be sent to the pharmacy so pt can have it on hand for an unrelenting HA.

## 2025-04-29 NOTE — H&P (VIEW-ONLY)
Name: Nancy Jones      : 1960      MRN: [id number]  Encounter Provider: Jewel Rankin MD  Encounter Date: 2025   Encounter department: [facility] GENER[facility] SURG[facility]  :  Assessment & Plan  Incisional hernia, without obstruction or gangrene  She has multiple fascial defects extending throughout the length of her abdominal incision.  Will plan for an open approach with a retrorectus repair using absorbable Phasix mesh.  There is a possibility she may need a unilateral or bilateral component separation.  The risks benefits alternatives were explained to her she is agreeable to proceed    Orders:    Case request operating room: REPAIR HERNIA INCISIONAL; Standing    CBC and differential; Future    Basic metabolic panel; Future    Comprehensive metabolic panel; Future    HEMOGLOBIN A1C W/ EAG ESTIMATION; Future    Postprocedural hypoinsulinemia    Orders:    HEMOGLOBIN A1C W/ EAG ESTIMATION; Future    A-fib (HCC)  Will need to hold her Eliquis for 2 days before surgery.         Obesity, morbid (HCC)             History of Present Illness   Nancy Jones is a 65 y.o. female who presents for evaluation of incisional hernia.  She has a history of and emergency exploratory laparotomy resection of her transverse colon down to sigmoid due to ischemia.  She unfortunate developed incisional hernias.  She was seen and tentatively scheduled in the past for this but had to reschedule and she is now here to resume care.  She does have some fullness and discomfort along her midline.  She does have some constipation symptoms and can get back to very easily and have to almost push things through.  She has some crampy pain across her abdomen to the right as well.  She also been having increasing acid reflux type symptoms.    2025 she is here for reevaluation and and final discussion prior to her surgery next week.    HPI  Review of Systems as per HPI.  Past Medical History   Past Medical History:   Diagnosis  "Date    A-fib ([facility]) 03/2020    Allergic     Anxiety     Arthritis     Asthma     Back pain     and muscle pain    Bruises easily     Chronic pain disorder     Interstitial pain    Colon polyp     Dental bridge present     Depression     Eczema     GERD (gastroesophageal reflux disease)     Heart murmur     History of blood clots     per pt \"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\"    History of pneumonia     Hives     Hyperlipidemia     Hypertension     Infertility, female     Interstitial cystitis     Migraine     sees neurologist    Motion sickness     Obesity     Palpitations     PONV (postoperative nausea and vomiting)     Premature atrial contractions 11/09/2022    Psychiatric disorder     TIA (transient ischemic attack) 09/2022    per pt --\"had MRI and 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\"    Urinary incontinence     wears Pads    Venous insufficiency 08/01/2017    Wears glasses      Past Surgical History:   Procedure Laterality Date    COLONOSCOPY      DILATION AND CURETTAGE OF UTERUS      EGD      EXPLORATORY LAPAROTOMY      for infertility    EXPLORATORY LAPAROTOMY W/ BOWEL RESECTION N/A 7/19/2023    Procedure: LAPAROTOMY EXPLORATORY W/ BOWEL RESECTION;  Surgeon: Crista Recinos MD;  Location: [facility];  Service: General    HAND SURGERY      Hand excision of tendon cyst    AK LAPAROSCOPIC APPENDECTOMY N/A 05/03/2022    Procedure: APPENDECTOMY LAPAROSCOPIC;  Surgeon: Vin Fields MD;  Location:  MAIN OR;  Service: General    AK LAPS ABD PRTM&OMENTUM DX W/WO SPEC BR/WA SPX N/A 7/19/2023    Procedure: LAPAROSCOPY DIAGNOSTIC, LYSIS OF ADHESIONS, LAPAROSCOPY TAKE TAKEDOWN OF LEFT COLON, EXPLORATORY LAPAROSCOPY, LYSIS OF ADHESIONS, RESECTION OF TRANSVERSE COLON SPLENIC FLEXURE AND DESCENDING COLON , TAKE DOWN OF SPLENIC FLEXURE, SIGMOIDOSCOPY, MOBILIZATION OF RIGHT COLON, PRIMARY ANASTOMOSIS EEA 29, TAP BLOCK;  Surgeon: Crista Recinos MD;  Location: [facility];  Service: General    AK " TCAT IV STENT CRV CRTD ART EMBOLIC PROTECJ Left 4/29/2024    Procedure: ANGIOPLASTY ARTERY CAROTID W/ STENT;  Surgeon: Roberto García DO;  Location: [facility];  Service: Vascular    OR TEAEC W/PATCH GRF CAROTID VERTB SUBCLAV NECK INC Left 1/31/2023    Procedure: EXPLORATION OF LEFT CAROTID ARTERY; ABORTED ENDARTERECTOMY ARTERY CAROTID;  Surgeon: Roberto García DO;  Location: [facility];  Service: Vascular    SUPERIOR MESTENTERIC ARTERY STENT      WISDOM TOOTH EXTRACTION       Family History   Problem Relation Age of Onset    Lung cancer Mother 60    Brain cancer Mother 60    Diabetes Father     Depression Father     Other Father         septic    Allergies Sister     Hashimoto's thyroiditis Sister     Abdominal aortic aneurysm Sister     Diabetes Sister     No Known Problems Sister     No Known Problems Maternal Grandmother     No Known Problems Maternal Grandfather     No Known Problems Paternal Grandmother     No Known Problems Paternal Grandfather     Hodgkin's lymphoma Brother     No Known Problems Brother     No Known Problems Maternal Aunt     Diabetes Other     Breast cancer Neg Hx       reports that she quit smoking about 6 years ago. Her smoking use included cigarettes. She started smoking about 16 years ago. She has a 5 pack-year smoking history. She has been exposed to tobacco smoke. She has never used smokeless tobacco. She reports that she does not currently use alcohol. She reports that she does not use drugs.  Current Outpatient Medications   Medication Instructions    acetaminophen (TYLENOL) 650 mg, Oral, Every 6 hours scheduled    apixaban (ELIQUIS) 5 mg, Oral, 2 times daily    butalbital-acetaminophen-caffeine (FIORICET,ESGIC) -40 mg per tablet 1 tablet, Oral, Every 4 hours PRN    chlordiazepoxide-clidinium (LIBRAX) 5-2.5 mg per capsule TAKE 1 CAPSULE BY MOUTH TWO TIMES DAILY AS NEEDED FOR INDIGESTION    chlorhexidine (PERIDEX) 0.12 % solution 15 mL, Mouth/Throat, 2 times daily     dexamethasone (DECADRON) 2 mg tablet One tab with breakfast (early in day to minimize impact on sleep, with food for stomach protection) for 1-5 days for unrelenting migraine    Diclofenac Sodium (VOLTAREN) 2 g, Topical, 4 times daily    diltiazem (CARDIZEM CD) 240 mg, Oral, Daily    Docusate Sodium (COLACE PO) Daily at bedtime    ezetimibe (ZETIA) 10 mg, Oral, Daily    famciclovir (FAMVIR) 500 mg, Oral, 3 times daily    fexofenadine (ALLEGRA) 180 mg, Daily    fluconazole (DIFLUCAN) 200 mg, Oral, Every 72 hours    fluticasone-vilanterol (Breo Ellipta) 200-25 mcg/actuation inhaler 1 puff, Inhalation, Daily, Rinse mouth after use.    furosemide (LASIX) 40 mg, Oral, Daily    levofloxacin (LEVAQUIN) 500 mg, Oral, Every 24 hours    LORazepam (ATIVAN) 1 mg, Oral, 3 times daily    LORazepam (ATIVAN) 2 mg, Oral, Every 8 hours PRN    lubiprostone (AMITIZA) 8 mcg, Oral, 2 times daily with meals    magnesium Oxide (MAG-OX) 400 mg, Oral, Daily at bedtime    Meth-Hyo-M Bl-Na Phos-Ph Sal (Uribel) 118 MG CAPS 118 mg, Oral, 3 times daily    methocarbamol (ROBAXIN) 1,000 mg, Oral, 3 times daily    metoprolol succinate (TOPROL-XL) 100 mg, Oral, 2 times daily    omeprazole (PRILOSEC) 40 mg, Oral, Daily    ondansetron (ZOFRAN-ODT) 4 mg, Oral, Every 8 hours PRN    phenazopyridine (PYRIDIUM) 200 mg, Oral, 3 times daily with meals    phenazopyridine (PYRIDIUM) 200 mg, Oral, 3 times daily with meals    polyethylene glycol (Golytely) 4000 mL solution 4,000 mL, Oral, Once, Take 4000 mL by mouth once for 1 dose. Use as directed    polyethylene glycol (MIRALAX) 238 g, Oral, Once, Take 238 g my mouth. Use as directed    polyethylene glycol (MIRALAX) 17 g, Oral, 2 times daily    promethazine (PHENERGAN) 25 mg tablet TAKE 1 TABLET BY MOUTH EVERY 6 HOURS AS NEEDED FOR NAUSEA AND/OR VOMITING    Repatha SureClick 140 mg, Subcutaneous, Every 14 days    spironolactone (ALDACTONE) 25 mg, Oral, 2 times daily    sucralfate (CARAFATE) 1 g, Oral, 4 times  "daily    traMADol (ULTRAM) 100 mg, Oral, Every 8 hours PRN    triamcinolone (KENALOG) 0.1 % ointment Topical, 2 times daily, START applying triamcinolone 0.1% ointment to the affected area twice daily for up to 2 weeks for flares. Do not use for more than 2 weeks.     Allergies   Allergen Reactions    Ace Inhibitors Angioedema and Anaphylaxis     Anaphylaxis    Benicar [Olmesartan] Angioedema     See Allergy note from 9/11/2008.  Swollen ankles/legs    Hydralazine Other (See Comments)     Lip swelling  Flank pain    Other Anaphylaxis and Other (See Comments)     Preservatives- itching throat closes, hi  E Z Cat scan contrast - hives throat closes itching,  Preservatives- itching throat closes, hi  Artificial sweeteners    Valsartan Angioedema     Lips, face swollen    Ampicillin Hives     Depends on brand some preservatives can react. Has recently tolerated oral amoxicillin.    Sulfa Antibiotics Hives     stuffiness,itching,hives,throat closing--per pt \"sometimes able to take depending on the brand and the filler or possibly preservatives in it\"    Motrin [Ibuprofen] Other (See Comments)     Per pt \" told not to take due to A Fib\"    Wound Dressing Adhesive Other (See Comments)     Per pt Adhesive on EKG leads caused redness      Current Outpatient Medications on File Prior to Visit   Medication Sig Dispense Refill    acetaminophen (TYLENOL) 325 mg tablet Take 2 tablets (650 mg total) by mouth every 6 (six) hours  0    apixaban (Eliquis) 5 mg Take 1 tablet (5 mg total) by mouth 2 (two) times a day 180 tablet 3    butalbital-acetaminophen-caffeine (FIORICET,ESGIC) -40 mg per tablet Take 1 tablet by mouth every 4 (four) hours as needed for headaches (Patient not taking: Reported on 4/23/2025) 20 tablet 0    chlordiazepoxide-clidinium (LIBRAX) 5-2.5 mg per capsule TAKE 1 CAPSULE BY MOUTH TWO TIMES DAILY AS NEEDED FOR INDIGESTION 60 capsule 2    chlorhexidine (PERIDEX) 0.12 % solution Apply 15 mL to the mouth or " throat 2 (two) times a day 120 mL 0    dexamethasone (DECADRON) 2 mg tablet One tab with breakfast (early in day to minimize impact on sleep, with food for stomach protection) for 1-5 days for unrelenting migraine 5 tablet 0    Diclofenac Sodium (VOLTAREN) 1 % Apply 2 g topically 4 (four) times a day 100 g 1    diltiazem (CARDIZEM CD) 240 mg 24 hr capsule Take 1 capsule (240 mg total) by mouth daily 100 capsule 0    Docusate Sodium (COLACE PO) Take by mouth daily at bedtime      Evolocumab (Repatha SureClick) 140 MG/ML SOAJ Inject 1 mL (140 mg total) under the skin every 14 (fourteen) days 6 mL 5    ezetimibe (ZETIA) 10 mg tablet Take 1 tablet (10 mg total) by mouth daily (Patient not taking: Reported on 4/23/2025) 30 tablet 5    famciclovir (FAMVIR) 500 mg tablet Take 1 tablet (500 mg total) by mouth 3 (three) times a day for 7 days (Patient not taking: Reported on 2/26/2025) 21 tablet 0    fexofenadine (ALLEGRA) 180 MG tablet Take 180 mg by mouth daily (Patient not taking: Reported on 2/26/2025)      fluconazole (DIFLUCAN) 200 mg tablet Take 1 tablet (200 mg total) by mouth every third day for 2 doses 2 tablet 0    fluticasone-vilanterol (Breo Ellipta) 200-25 mcg/actuation inhaler Inhale 1 puff daily Rinse mouth after use. 200 blister 1    furosemide (LASIX) 40 mg tablet Take 1 tablet (40 mg total) by mouth daily 90 tablet 1    levofloxacin (LEVAQUIN) 500 mg tablet Take 1 tablet (500 mg total) by mouth every 24 hours for 7 days 7 tablet 0    LORazepam (ATIVAN) 1 mg tablet Take 1 tablet (1 mg total) by mouth 3 (three) times a day (Patient not taking: Reported on 4/4/2025) 90 tablet 0    LORazepam (ATIVAN) 2 mg tablet Take 1 tablet (2 mg total) by mouth every 8 (eight) hours as needed for anxiety 90 tablet 5    lubiprostone (AMITIZA) 8 mcg capsule Take 1 capsule (8 mcg total) by mouth 2 (two) times a day with meals 60 capsule 2    magnesium Oxide (MAG-OX) 400 mg TABS Take 1 tablet (400 mg total) by mouth daily at  bedtime 90 tablet 4    Meth-Hyo-M Bl-Na Phos-Ph Sal (Uribel) 118 MG CAPS Take 1 capsule (118 mg total) by mouth 3 (three) times a day (Patient not taking: Reported on 4/23/2025) 90 capsule 3    methocarbamol (ROBAXIN) 500 mg tablet TAKE TWO TABLETS BY MOUTH THREE TIMES A  tablet 2    metoprolol succinate (TOPROL-XL) 100 mg 24 hr tablet Take 1 tablet (100 mg total) by mouth 2 (two) times a day 180 tablet 1    omeprazole (PriLOSEC) 40 MG capsule Take 1 capsule (40 mg total) by mouth daily 90 capsule 1    ondansetron (ZOFRAN-ODT) 4 mg disintegrating tablet Take 1 tablet (4 mg total) by mouth every 8 (eight) hours as needed for nausea 21 tablet 5    phenazopyridine (PYRIDIUM) 200 mg tablet Take 1 tablet (200 mg total) by mouth 3 (three) times a day with meals 10 tablet 0    phenazopyridine (PYRIDIUM) 200 mg tablet Take 1 tablet (200 mg total) by mouth 3 (three) times a day with meals 90 tablet 5    polyethylene glycol (Golytely) 4000 mL solution Take 4,000 mL by mouth once for 1 dose Take 4000 mL by mouth once for 1 dose. Use as directed 4000 mL 0    polyethylene glycol (MIRALAX) 17 g packet Take 17 g by mouth 2 (two) times a day 1020 g 2    polyethylene glycol (MiraLax) 17 GM/SCOOP powder Take 238 g by mouth once for 1 dose Take 238 g my mouth. Use as directed 238 g 0    promethazine (PHENERGAN) 25 mg tablet TAKE 1 TABLET BY MOUTH EVERY 6 HOURS AS NEEDED FOR NAUSEA AND/OR VOMITING 120 tablet 0    spironolactone (ALDACTONE) 25 mg tablet Take 1 tablet (25 mg total) by mouth 2 (two) times a day 180 tablet 1    sucralfate (CARAFATE) 1 g tablet Take 1 tablet (1 g total) by mouth 4 (four) times a day 120 tablet 5    traMADol (ULTRAM) 50 mg tablet Take 2 tablets (100 mg total) by mouth every 8 (eight) hours as needed for moderate pain 180 tablet 5    triamcinolone (KENALOG) 0.1 % ointment Apply topically 2 (two) times a day START applying triamcinolone 0.1% ointment to the affected area twice daily for up to 2 weeks  "for flares. Do not use for more than 2 weeks. 80 g 3     Current Facility-Administered Medications on File Prior to Visit   Medication Dose Route Frequency Provider Last Rate Last Admin    cyanocobalamin injection 1,000 mcg  1,000 mcg Intramuscular Q30 Days Coy Ayoub DO   1,000 mcg at 25 1325      Social History     Tobacco Use    Smoking status: Former     Current packs/day: 0.00     Average packs/day: 0.5 packs/day for 10.0 years (5.0 ttl pk-yrs)     Types: Cigarettes     Start date:      Quit date: 2019     Years since quittin.3     Passive exposure: Past    Smokeless tobacco: Never   Vaping Use    Vaping status: Never Used   Substance and Sexual Activity    Alcohol use: Not Currently    Drug use: No    Sexual activity: Not Currently     Comment: defer        Objective   /69 (BP Location: Right arm, Patient Position: Sitting, Cuff Size: Standard)   Pulse 66   Temp (!) 96.7 °F (35.9 °C) (Temporal)   Ht 5' 4\" (1.626 m)   Wt 94.8 kg (209 lb)   LMP  (LMP Unknown)   SpO2 94%   BMI 35.87 kg/m²      Physical Exam  Vitals and nursing note reviewed.   Constitutional:       General: She is not in acute distress.     Appearance: She is well-developed. She is not diaphoretic.   HENT:      Head: Normocephalic and atraumatic.   Eyes:      Pupils: Pupils are equal, round, and reactive to light.   Cardiovascular:      Rate and Rhythm: Normal rate.   Pulmonary:      Effort: Pulmonary effort is normal. No respiratory distress.   Abdominal:      General: Bowel sounds are normal.      Palpations: Abdomen is soft.      Comments: Large midline scar.  Several incisional hernias with the largest being at the umbilicus into the left.   Musculoskeletal:         General: Normal range of motion.      Cervical back: Normal range of motion and neck supple.   Skin:     General: Skin is warm and dry.   Neurological:      Mental Status: She is alert and oriented to person, place, and time.   Psychiatric:         " Behavior: Behavior normal.           Radiology Results Review: I personally reviewed the following image studies in PACS and associated radiology reports: CT abdomen/pelvis. My interpretation of the radiology images/reports is: Multiple fascial defects starting high near the xiphoid with the edge of the liver fossa protruding all way down to both umbilicus.  There is good fascia remaining inferiorly.  The most inferior hernia is containing loops of small bowel..

## 2025-04-30 ENCOUNTER — ANESTHESIA EVENT (OUTPATIENT)
Dept: PERIOP | Facility: HOSPITAL | Age: 65
DRG: 355 | End: 2025-04-30
Payer: COMMERCIAL

## 2025-05-01 RX ORDER — DEXAMETHASONE 2 MG/1
TABLET ORAL
Qty: 5 TABLET | Refills: 0 | Status: SHIPPED | OUTPATIENT
Start: 2025-05-01

## 2025-05-01 NOTE — TELEPHONE ENCOUNTER
I do not yet have a specific brand of magnesium I recommend.  Magnesium oxide is what was most studied with migraine patients and is what is sometimes covered by insurance and thus why I pick it. However, magnesium glycinate may work even better or be better tolerated.  Further research really needs to be done on supplements to help with our understanding. I sent in the dexamethasone to have on hand.

## 2025-05-02 ENCOUNTER — E-CONSULT (OUTPATIENT)
Dept: INFECTIOUS DISEASES | Facility: CLINIC | Age: 65
End: 2025-05-02
Payer: COMMERCIAL

## 2025-05-02 ENCOUNTER — TELEPHONE (OUTPATIENT)
Age: 65
End: 2025-05-02

## 2025-05-02 ENCOUNTER — TELEPHONE (OUTPATIENT)
Dept: UROLOGY | Facility: CLINIC | Age: 65
End: 2025-05-02

## 2025-05-02 DIAGNOSIS — N39.0 RECURRENT UTI: Primary | ICD-10-CM

## 2025-05-02 DIAGNOSIS — R30.0 DYSURIA: Primary | ICD-10-CM

## 2025-05-02 PROCEDURE — 99448 NTRPROF PH1/NTRNET/EHR 21-30: CPT | Performed by: INTERNAL MEDICINE

## 2025-05-02 NOTE — PROGRESS NOTES
E-Consult  Nancy Jones 65 y.o. female MRN: [id number]  Encounter Date: 05/02/25      Reason for Consult / Principal Problem: UTI    Consulting Provider: Santos Kebede MD    Requesting Provider: Rafael Pascual CRNP       ASSESSMENT:  65-year-old female with atrial fibrillation, CAMILLE, peripheral vascular disease, CKD stage III, morbid obesity.  On 4/15 patient called into urology reporting acute symptoms of dysuria with concern for UTI.  She was given a prescription for cephalexin.  On 4/21 a UA was performed which had only 1-2 WBCs, culture grew greater than 100,000 Enterococcus durans susceptible to Ampicillin, Levofloxacin and Nitrofurantoin.  On 4/24 patient was sent a prescription for levofloxacin x 7 days based on the culture result.  She then called in on 4/29 reporting that she was having ongoing UTI symptoms of burning, frequency, urgency and bladder pain.  She was given a prescription for fluconazole for possible yeast infection.  She called in again on 5/2 reporting persistent symptoms of dysuria, urinary frequency, abdominal bloating but denied fevers or chills.    RECOMMENDATIONS:  Based on the culture results from 4/21, levofloxacin should have been effective therapy.  Unclear if patient's persistent symptoms are due to unresolved UTI from Enterococcus versus a reinfection with a new organism versus a noninfectious etiology. Of note, urine odor and color are not good indicators of infection so would go off of symptoms including dysuria, urinary frequency, urinary urgency, bladder pain etc.     Would recommend sending a new UA with urine microscopy and reflex to culture now if patient is still symptomatic.  If she had an incompletely treated infection or reinfection there should be presence of ongoing pyuria and possibly positive urine culture.  Could then plan to retreat for UTI based off this culture result. If symptoms fail to improve then would suggest a non-infectious cause.     If urine is still  growing the same Enterococcus species, could next try oral amoxicillin.  Although patient has an ampicillin allergy listed, there is a note stating that she had recently tolerated oral amoxicillin.  Based on her BMI dose would be 1g every 8 hours.    Should patient start developing more systemic symptoms including CVA pain, fever or chills would then recommend she proceed to the ER for further evaluation and possible imaging of her  tract.    If patient's upcoming hernia repair surgery is elective and nonurgent, may be best to plan to hold off until further evaluation and monitoring of her current symptoms.    Total time spent  21-30 minutes, >50% of the total time devoted to medical consultative verbal/EMR discussion between providers. Written report will be generated in the EMR. .

## 2025-05-02 NOTE — TELEPHONE ENCOUNTER
I discussed further with infectious disease.  Recommendations are to retest for UTI and base treatment on those result. Should patient develop and systemic symptom including CVA pain, fevers, or chills would then recommend ER for further evaluation and possible imaging of her  tract.  As for patient's upcoming hernia repair surgery if this is elective and nonemergent it would be best to reschedule this until further evaluation and monitoring of her current symptoms has improved.

## 2025-05-02 NOTE — TELEPHONE ENCOUNTER
I placed a E consultation for infectious disease for their input.  Patient may need to postpone hernia repair surgery due to persistent symptoms despite completion of appropriate antibiotic course.

## 2025-05-02 NOTE — TELEPHONE ENCOUNTER
Pt called stating she is holding Eliquis for two days prior to hernia repair scheduled for Monday and wanted to Lynn to  be aware.

## 2025-05-02 NOTE — PRE-PROCEDURE INSTRUCTIONS
Pre-Surgery Instructions:   Medication Instructions    acetaminophen (TYLENOL) 325 mg tablet Uses PRN- OK to take day of surgery    apixaban (Eliquis) 5 mg Stop taking 2 days prior to surgery. Last dose 5/2    chlordiazepoxide-clidinium (LIBRAX) 5-2.5 mg per capsule Hold day of surgery.    chlorhexidine (PERIDEX) 0.12 % solution Hold day of surgery.    dexamethasone (DECADRON) 2 mg tablet Hold day of surgery.    Diclofenac Sodium (VOLTAREN) 1 % Hold day of surgery.    diltiazem (CARDIZEM CD) 240 mg 24 hr capsule Take day of surgery. If arrival time closer to afternoon time, when normal dose is.    Docusate Sodium (COLACE PO) Take night before surgery    Evolocumab (Repatha SureClick) 140 MG/ML SOAJ Instructions provided by MD, last dose 5/2    fluconazole (DIFLUCAN) 200 mg tablet Instructions provided by MD-complete 5/2    fluticasone-vilanterol (Breo Ellipta) 200-25 mcg/actuation inhaler Take day of surgery. Bring day of surgery    furosemide (LASIX) 40 mg tablet Hold day of surgery.    LORazepam (ATIVAN) 2 mg tablet Take day of surgery. As needed    methocarbamol (ROBAXIN) 500 mg tablet Take day of surgery. As needed    metoprolol succinate (TOPROL-XL) 100 mg 24 hr tablet Take day of surgery.    omeprazole (PriLOSEC) 40 MG capsule Take day of surgery.    ondansetron (ZOFRAN-ODT) 4 mg disintegrating tablet Uses PRN- OK to take day of surgery or phenergan, not both    phenazopyridine (PYRIDIUM) 200 mg tablet Uses PRN- DO NOT take day of surgery, turns urine orange    polyethylene glycol (MIRALAX) 17 g packet Hold day of surgery.    promethazine (PHENERGAN) 25 mg tablet Uses PRN- OK to take day of surgery or zofran, not both    spironolactone (ALDACTONE) 25 mg tablet Hold day of surgery.    sucralfate (CARAFATE) 1 g tablet Take day of surgery.    traMADol (ULTRAM) 50 mg tablet Uses PRN- OK to take day of surgery    triamcinolone (KENALOG) 0.1 % ointment Hold day of surgery.    Medication instructions for day of  surgery reviewed. Please take all instructed medications with only a sip of water.       You will receive a call one business day prior to surgery with an arrival time and hospital directions. If your surgery is scheduled on a Monday, the hospital will be calling you on the Friday prior to your surgery. If you have not heard from anyone by 8pm, please call the hospital supervisor through the hospital  at [phone]. ([place] [phone] or [place] [phone]).    Do not eat or drink anything after midnight the night before your surgery, including candy, mints, lifesavers, or chewing gum. Do not drink alcohol 24hrs before your surgery. Try not to smoke at least 24hrs before your surgery.       Follow the pre surgery showering instructions as listed in the “My Surgical Experience Booklet” or otherwise provided by your surgeon's office. Do not use a blade to shave the surgical area 1 week before surgery. It is okay to use a clean electric clippers up to 24 hours before surgery. Do not apply any lotions, creams, including makeup, cologne, deodorant, or perfumes after showering on the day of your surgery. Do not use dry shampoo, hair spray, hair gel, or any type of hair products.     No contact lenses, eye make-up, or artificial eyelashes. Remove nail polish, including gel polish, and any artificial, gel, or acrylic nails if possible. Remove all jewelry including rings and body piercing jewelry.     Wear causal clothing that is easy to take on and off. Consider your type of surgery.    Keep any valuables, jewelry, piercings at home. Please bring any specially ordered equipment (sling, braces) if indicated.    Arrange for a responsible person to drive you to and from the hospital on the day of your surgery. Please confirm the visitor policy for the day of your procedure when you receive your phone call with an arrival time.     Call the surgeon's office with any new illnesses, exposures, or additional  questions prior to surgery.    Please reference your “My Surgical Experience Booklet” for additional information to prepare for your upcoming surgery.

## 2025-05-02 NOTE — TELEPHONE ENCOUNTER
Called and spoke with patient to make her aware ID performed an e-consult and reviewed her chart. Patient reports she is not currently having any s/s of infection. She states she was having some burning with urination, however this has resolved with prn pyridium. Patient states Dr. Rankin who is performing her hernia repair surgery on Monday was aware that patient had a UTI and was treated with Levaquin and plan is for patient to proceed as scheduled.

## 2025-05-02 NOTE — TELEPHONE ENCOUNTER
Patient is still having UTI symptoms, burning with urination, frequency, abdominal bloating, cloudy urine with odor    Denies fever, chills, hematuria    Levaquin last dose was yesterday, discussed it is still working and to give it a few days. Encouraged to increase water to 64 ounces daily, decrease bladder irritants, tylenol/Ibuprofen as needed, UC over the weekend if symptoms worsen    Having hernia repair on Monday and very concerned her symptoms will delay the surgery, advised to call them and discuss as

## 2025-05-04 DIAGNOSIS — R11.0 NAUSEA: ICD-10-CM

## 2025-05-05 ENCOUNTER — HOSPITAL ENCOUNTER (INPATIENT)
Facility: HOSPITAL | Age: 65
LOS: 3 days | Discharge: HOME HEALTH | DRG: 355 | End: 2025-05-08
Attending: SURGERY | Admitting: SURGERY
Payer: COMMERCIAL

## 2025-05-05 ENCOUNTER — ANESTHESIA (OUTPATIENT)
Dept: PERIOP | Facility: HOSPITAL | Age: 65
DRG: 355 | End: 2025-05-05
Payer: COMMERCIAL

## 2025-05-05 DIAGNOSIS — K43.2 INCISIONAL HERNIA, WITHOUT OBSTRUCTION OR GANGRENE: Primary | ICD-10-CM

## 2025-05-05 DIAGNOSIS — K05.10 GINGIVITIS: ICD-10-CM

## 2025-05-05 DIAGNOSIS — E11.9 DIABETES (HCC): ICD-10-CM

## 2025-05-05 DIAGNOSIS — G89.18 ACUTE POSTOPERATIVE PAIN: ICD-10-CM

## 2025-05-05 DIAGNOSIS — R73.09 ELEVATED HEMOGLOBIN A1C: ICD-10-CM

## 2025-05-05 LAB
BACTERIA UR QL AUTO: ABNORMAL /HPF
BILIRUB UR QL STRIP: NEGATIVE
CLARITY UR: CLEAR
COLOR UR: ABNORMAL
GLUCOSE UR STRIP-MCNC: ABNORMAL MG/DL
HGB UR QL STRIP.AUTO: NEGATIVE
HYALINE CASTS #/AREA URNS LPF: ABNORMAL /LPF
KETONES UR STRIP-MCNC: NEGATIVE MG/DL
LEUKOCYTE ESTERASE UR QL STRIP: NEGATIVE
MUCOUS THREADS UR QL AUTO: ABNORMAL
NITRITE UR QL STRIP: POSITIVE
NON-SQ EPI CELLS URNS QL MICRO: ABNORMAL /HPF
PH UR STRIP.AUTO: 6.5 [PH]
PLATELET # BLD AUTO: 204 THOUSANDS/UL (ref 149–390)
PMV BLD AUTO: 9.1 FL (ref 8.9–12.7)
PROT UR STRIP-MCNC: NEGATIVE MG/DL
RBC #/AREA URNS AUTO: ABNORMAL /HPF
SP GR UR STRIP.AUTO: 1.01 (ref 1–1.03)
UROBILINOGEN UR STRIP-ACNC: <2 MG/DL
WBC #/AREA URNS AUTO: ABNORMAL /HPF

## 2025-05-05 PROCEDURE — 15734 MUSCLE-SKIN GRAFT TRUNK: CPT | Performed by: SURGERY

## 2025-05-05 PROCEDURE — 0KNL0ZZ RELEASE LEFT ABDOMEN MUSCLE, OPEN APPROACH: ICD-10-PCS | Performed by: SURGERY

## 2025-05-05 PROCEDURE — 0WUF0JZ SUPPLEMENT ABDOMINAL WALL WITH SYNTHETIC SUBSTITUTE, OPEN APPROACH: ICD-10-PCS | Performed by: SURGERY

## 2025-05-05 PROCEDURE — A6250 SKIN SEAL PROTECT MOISTURIZR: HCPCS | Performed by: SURGERY

## 2025-05-05 PROCEDURE — 85049 AUTOMATED PLATELET COUNT: CPT | Performed by: SURGERY

## 2025-05-05 PROCEDURE — C1781 MESH (IMPLANTABLE): HCPCS | Performed by: SURGERY

## 2025-05-05 PROCEDURE — 49595 RPR AA HRN 1ST > 10 RDC: CPT | Performed by: SURGERY

## 2025-05-05 PROCEDURE — 0KNK0ZZ RELEASE RIGHT ABDOMEN MUSCLE, OPEN APPROACH: ICD-10-PCS | Performed by: SURGERY

## 2025-05-05 PROCEDURE — 81001 URINALYSIS AUTO W/SCOPE: CPT | Performed by: SURGERY

## 2025-05-05 DEVICE — PHASIX MESH, 10" X 12" (25.4 CM X 30.5 CM), RECTANGLE
Type: IMPLANTABLE DEVICE | Site: ABDOMEN | Status: FUNCTIONAL
Brand: PHASIX

## 2025-05-05 RX ORDER — FENTANYL CITRATE/PF 50 MCG/ML
25 SYRINGE (ML) INJECTION
Status: COMPLETED | OUTPATIENT
Start: 2025-05-05 | End: 2025-05-05

## 2025-05-05 RX ORDER — MAGNESIUM HYDROXIDE 1200 MG/15ML
LIQUID ORAL AS NEEDED
Status: DISCONTINUED | OUTPATIENT
Start: 2025-05-05 | End: 2025-05-05 | Stop reason: HOSPADM

## 2025-05-05 RX ORDER — FLUTICASONE FUROATE AND VILANTEROL 200; 25 UG/1; UG/1
1 POWDER RESPIRATORY (INHALATION) DAILY
Status: DISCONTINUED | OUTPATIENT
Start: 2025-05-06 | End: 2025-05-08 | Stop reason: HOSPADM

## 2025-05-05 RX ORDER — ACETAMINOPHEN 10 MG/ML
1000 INJECTION, SOLUTION INTRAVENOUS ONCE
Status: COMPLETED | OUTPATIENT
Start: 2025-05-05 | End: 2025-05-05

## 2025-05-05 RX ORDER — BUPIVACAINE HYDROCHLORIDE AND EPINEPHRINE 2.5; 5 MG/ML; UG/ML
INJECTION, SOLUTION EPIDURAL; INFILTRATION; INTRACAUDAL; PERINEURAL AS NEEDED
Status: DISCONTINUED | OUTPATIENT
Start: 2025-05-05 | End: 2025-05-05 | Stop reason: HOSPADM

## 2025-05-05 RX ORDER — HEPARIN SODIUM 5000 [USP'U]/ML
5000 INJECTION, SOLUTION INTRAVENOUS; SUBCUTANEOUS EVERY 8 HOURS SCHEDULED
Status: DISCONTINUED | OUTPATIENT
Start: 2025-05-05 | End: 2025-05-06

## 2025-05-05 RX ORDER — DEXAMETHASONE SODIUM PHOSPHATE 10 MG/ML
INJECTION, SOLUTION INTRAMUSCULAR; INTRAVENOUS AS NEEDED
Status: DISCONTINUED | OUTPATIENT
Start: 2025-05-05 | End: 2025-05-05

## 2025-05-05 RX ORDER — MEPERIDINE HYDROCHLORIDE 25 MG/ML
12.5 INJECTION INTRAMUSCULAR; INTRAVENOUS; SUBCUTANEOUS
Status: DISCONTINUED | OUTPATIENT
Start: 2025-05-05 | End: 2025-05-05 | Stop reason: HOSPADM

## 2025-05-05 RX ORDER — ROCURONIUM BROMIDE 10 MG/ML
INJECTION, SOLUTION INTRAVENOUS AS NEEDED
Status: DISCONTINUED | OUTPATIENT
Start: 2025-05-05 | End: 2025-05-05

## 2025-05-05 RX ORDER — ONDANSETRON 2 MG/ML
4 INJECTION INTRAMUSCULAR; INTRAVENOUS ONCE AS NEEDED
Status: DISCONTINUED | OUTPATIENT
Start: 2025-05-05 | End: 2025-05-05 | Stop reason: HOSPADM

## 2025-05-05 RX ORDER — ACETAMINOPHEN 325 MG/1
975 TABLET ORAL EVERY 8 HOURS SCHEDULED
Status: DISCONTINUED | OUTPATIENT
Start: 2025-05-05 | End: 2025-05-08 | Stop reason: HOSPADM

## 2025-05-05 RX ORDER — HYDROMORPHONE HCL/PF 1 MG/ML
SYRINGE (ML) INJECTION AS NEEDED
Status: DISCONTINUED | OUTPATIENT
Start: 2025-05-05 | End: 2025-05-05

## 2025-05-05 RX ORDER — DILTIAZEM HYDROCHLORIDE 240 MG/1
240 CAPSULE, COATED, EXTENDED RELEASE ORAL DAILY
Status: DISCONTINUED | OUTPATIENT
Start: 2025-05-06 | End: 2025-05-08 | Stop reason: HOSPADM

## 2025-05-05 RX ORDER — CEFAZOLIN SODIUM 2 G/50ML
2000 SOLUTION INTRAVENOUS ONCE
Status: COMPLETED | OUTPATIENT
Start: 2025-05-05 | End: 2025-05-05

## 2025-05-05 RX ORDER — ONDANSETRON 2 MG/ML
INJECTION INTRAMUSCULAR; INTRAVENOUS AS NEEDED
Status: DISCONTINUED | OUTPATIENT
Start: 2025-05-05 | End: 2025-05-05

## 2025-05-05 RX ORDER — MIDAZOLAM HYDROCHLORIDE 2 MG/2ML
INJECTION, SOLUTION INTRAMUSCULAR; INTRAVENOUS AS NEEDED
Status: DISCONTINUED | OUTPATIENT
Start: 2025-05-05 | End: 2025-05-05

## 2025-05-05 RX ORDER — SODIUM CHLORIDE, SODIUM LACTATE, POTASSIUM CHLORIDE, CALCIUM CHLORIDE 600; 310; 30; 20 MG/100ML; MG/100ML; MG/100ML; MG/100ML
125 INJECTION, SOLUTION INTRAVENOUS CONTINUOUS
Status: DISCONTINUED | OUTPATIENT
Start: 2025-05-05 | End: 2025-05-05

## 2025-05-05 RX ORDER — SODIUM CHLORIDE, SODIUM LACTATE, POTASSIUM CHLORIDE, CALCIUM CHLORIDE 600; 310; 30; 20 MG/100ML; MG/100ML; MG/100ML; MG/100ML
20 INJECTION, SOLUTION INTRAVENOUS CONTINUOUS
Status: DISCONTINUED | OUTPATIENT
Start: 2025-05-05 | End: 2025-05-05

## 2025-05-05 RX ORDER — PHENYLEPHRINE HCL IN 0.9% NACL 1 MG/10 ML
SYRINGE (ML) INTRAVENOUS AS NEEDED
Status: DISCONTINUED | OUTPATIENT
Start: 2025-05-05 | End: 2025-05-05

## 2025-05-05 RX ORDER — PROPOFOL 10 MG/ML
INJECTION, EMULSION INTRAVENOUS AS NEEDED
Status: DISCONTINUED | OUTPATIENT
Start: 2025-05-05 | End: 2025-05-05

## 2025-05-05 RX ORDER — GABAPENTIN 100 MG/1
100 CAPSULE ORAL 3 TIMES DAILY
Status: DISCONTINUED | OUTPATIENT
Start: 2025-05-05 | End: 2025-05-08 | Stop reason: HOSPADM

## 2025-05-05 RX ORDER — LIDOCAINE HYDROCHLORIDE 20 MG/ML
INJECTION, SOLUTION EPIDURAL; INFILTRATION; INTRACAUDAL; PERINEURAL AS NEEDED
Status: DISCONTINUED | OUTPATIENT
Start: 2025-05-05 | End: 2025-05-05

## 2025-05-05 RX ORDER — HYDROMORPHONE HCL/PF 1 MG/ML
0.5 SYRINGE (ML) INJECTION
Status: DISCONTINUED | OUTPATIENT
Start: 2025-05-05 | End: 2025-05-05 | Stop reason: HOSPADM

## 2025-05-05 RX ORDER — OXYCODONE HYDROCHLORIDE 5 MG/1
5 TABLET ORAL EVERY 4 HOURS PRN
Refills: 0 | Status: DISCONTINUED | OUTPATIENT
Start: 2025-05-05 | End: 2025-05-08

## 2025-05-05 RX ORDER — FENTANYL CITRATE 50 UG/ML
INJECTION, SOLUTION INTRAMUSCULAR; INTRAVENOUS AS NEEDED
Status: DISCONTINUED | OUTPATIENT
Start: 2025-05-05 | End: 2025-05-05

## 2025-05-05 RX ORDER — PANTOPRAZOLE SODIUM 40 MG/1
40 TABLET, DELAYED RELEASE ORAL
Status: DISCONTINUED | OUTPATIENT
Start: 2025-05-06 | End: 2025-05-08 | Stop reason: HOSPADM

## 2025-05-05 RX ORDER — PHENAZOPYRIDINE HYDROCHLORIDE 100 MG/1
200 TABLET, FILM COATED ORAL
Status: DISCONTINUED | OUTPATIENT
Start: 2025-05-05 | End: 2025-05-08 | Stop reason: HOSPADM

## 2025-05-05 RX ORDER — METHOCARBAMOL 500 MG/1
500 TABLET, FILM COATED ORAL EVERY 6 HOURS SCHEDULED
Status: DISCONTINUED | OUTPATIENT
Start: 2025-05-05 | End: 2025-05-08 | Stop reason: HOSPADM

## 2025-05-05 RX ORDER — LORAZEPAM 1 MG/1
2 TABLET ORAL EVERY 8 HOURS PRN
Status: DISCONTINUED | OUTPATIENT
Start: 2025-05-05 | End: 2025-05-08 | Stop reason: HOSPADM

## 2025-05-05 RX ORDER — SODIUM CHLORIDE, SODIUM GLUCONATE, SODIUM ACETATE, POTASSIUM CHLORIDE, MAGNESIUM CHLORIDE, SODIUM PHOSPHATE, DIBASIC, AND POTASSIUM PHOSPHATE .53; .5; .37; .037; .03; .012; .00082 G/100ML; G/100ML; G/100ML; G/100ML; G/100ML; G/100ML; G/100ML
125 INJECTION, SOLUTION INTRAVENOUS CONTINUOUS
Status: DISCONTINUED | OUTPATIENT
Start: 2025-05-05 | End: 2025-05-06

## 2025-05-05 RX ORDER — METOPROLOL SUCCINATE 100 MG/1
100 TABLET, EXTENDED RELEASE ORAL 2 TIMES DAILY
Status: DISCONTINUED | OUTPATIENT
Start: 2025-05-05 | End: 2025-05-08 | Stop reason: HOSPADM

## 2025-05-05 RX ORDER — PROMETHAZINE HYDROCHLORIDE 25 MG/1
TABLET ORAL
Qty: 120 TABLET | Refills: 1 | Status: SHIPPED | OUTPATIENT
Start: 2025-05-05

## 2025-05-05 RX ORDER — LABETALOL HYDROCHLORIDE 5 MG/ML
INJECTION, SOLUTION INTRAVENOUS AS NEEDED
Status: DISCONTINUED | OUTPATIENT
Start: 2025-05-05 | End: 2025-05-05

## 2025-05-05 RX ORDER — ALBUMIN HUMAN 50 G/1000ML
SOLUTION INTRAVENOUS CONTINUOUS PRN
Status: DISCONTINUED | OUTPATIENT
Start: 2025-05-05 | End: 2025-05-05

## 2025-05-05 RX ORDER — SUCRALFATE 1 G/1
1 TABLET ORAL 2 TIMES DAILY
Status: DISCONTINUED | OUTPATIENT
Start: 2025-05-05 | End: 2025-05-08 | Stop reason: HOSPADM

## 2025-05-05 RX ORDER — OXYCODONE HYDROCHLORIDE 10 MG/1
10 TABLET ORAL EVERY 4 HOURS PRN
Refills: 0 | Status: DISCONTINUED | OUTPATIENT
Start: 2025-05-05 | End: 2025-05-08 | Stop reason: HOSPADM

## 2025-05-05 RX ORDER — MAGNESIUM SULFATE HEPTAHYDRATE 40 MG/ML
2 INJECTION, SOLUTION INTRAVENOUS ONCE
Status: COMPLETED | OUTPATIENT
Start: 2025-05-05 | End: 2025-05-05

## 2025-05-05 RX ORDER — HYDROMORPHONE HCL/PF 1 MG/ML
0.5 SYRINGE (ML) INJECTION
Status: DISCONTINUED | OUTPATIENT
Start: 2025-05-05 | End: 2025-05-08 | Stop reason: HOSPADM

## 2025-05-05 RX ADMIN — MIDAZOLAM 2 MG: 1 INJECTION INTRAMUSCULAR; INTRAVENOUS at 14:26

## 2025-05-05 RX ADMIN — SUCRALFATE 1 G: 1 TABLET ORAL at 22:00

## 2025-05-05 RX ADMIN — LIDOCAINE HYDROCHLORIDE 80 MG: 20 INJECTION, SOLUTION EPIDURAL; INFILTRATION; INTRACAUDAL at 14:32

## 2025-05-05 RX ADMIN — DEXAMETHASONE SODIUM PHOSPHATE 10 MG: 10 INJECTION, SOLUTION INTRAMUSCULAR; INTRAVENOUS at 14:32

## 2025-05-05 RX ADMIN — CEFAZOLIN SODIUM 100 MG: 2 SOLUTION INTRAVENOUS at 14:26

## 2025-05-05 RX ADMIN — FENTANYL CITRATE 25 MCG: 50 INJECTION INTRAMUSCULAR; INTRAVENOUS at 19:16

## 2025-05-05 RX ADMIN — METOPROLOL SUCCINATE 100 MG: 100 TABLET, EXTENDED RELEASE ORAL at 22:00

## 2025-05-05 RX ADMIN — PHENAZOPYRIDINE 200 MG: 100 TABLET ORAL at 22:00

## 2025-05-05 RX ADMIN — CEFAZOLIN SODIUM 1900 MG: 2 SOLUTION INTRAVENOUS at 14:30

## 2025-05-05 RX ADMIN — ONDANSETRON 4 MG: 2 INJECTION INTRAMUSCULAR; INTRAVENOUS at 17:59

## 2025-05-05 RX ADMIN — FENTANYL CITRATE 50 MCG: 50 INJECTION INTRAMUSCULAR; INTRAVENOUS at 15:31

## 2025-05-05 RX ADMIN — METHOCARBAMOL 500 MG: 500 TABLET ORAL at 22:00

## 2025-05-05 RX ADMIN — FENTANYL CITRATE 50 MCG: 50 INJECTION INTRAMUSCULAR; INTRAVENOUS at 14:32

## 2025-05-05 RX ADMIN — MAGNESIUM SULFATE HEPTAHYDRATE 2 G: 40 INJECTION, SOLUTION INTRAVENOUS at 16:03

## 2025-05-05 RX ADMIN — HYDROMORPHONE HYDROCHLORIDE 0.5 MG: 1 INJECTION, SOLUTION INTRAMUSCULAR; INTRAVENOUS; SUBCUTANEOUS at 15:01

## 2025-05-05 RX ADMIN — OXYCODONE 5 MG: 5 TABLET ORAL at 22:03

## 2025-05-05 RX ADMIN — HYDROMORPHONE HYDROCHLORIDE 0.5 MG: 1 INJECTION, SOLUTION INTRAMUSCULAR; INTRAVENOUS; SUBCUTANEOUS at 14:30

## 2025-05-05 RX ADMIN — FENTANYL CITRATE 25 MCG: 50 INJECTION INTRAMUSCULAR; INTRAVENOUS at 19:00

## 2025-05-05 RX ADMIN — ACETAMINOPHEN 975 MG: 325 TABLET, FILM COATED ORAL at 22:00

## 2025-05-05 RX ADMIN — HEPARIN SODIUM 5000 UNITS: 5000 INJECTION, SOLUTION INTRAVENOUS; SUBCUTANEOUS at 22:00

## 2025-05-05 RX ADMIN — ACETAMINOPHEN 1000 MG: 10 INJECTION INTRAVENOUS at 15:12

## 2025-05-05 RX ADMIN — SODIUM CHLORIDE, SODIUM GLUCONATE, SODIUM ACETATE, POTASSIUM CHLORIDE AND MAGNESIUM CHLORIDE 125 ML/HR: 526; 502; 368; 37; 30 INJECTION, SOLUTION INTRAVENOUS at 22:01

## 2025-05-05 RX ADMIN — FENTANYL CITRATE 50 MCG: 50 INJECTION INTRAMUSCULAR; INTRAVENOUS at 16:12

## 2025-05-05 RX ADMIN — SODIUM CHLORIDE, SODIUM LACTATE, POTASSIUM CHLORIDE, AND CALCIUM CHLORIDE: .6; .31; .03; .02 INJECTION, SOLUTION INTRAVENOUS at 15:02

## 2025-05-05 RX ADMIN — FENTANYL CITRATE 50 MCG: 50 INJECTION INTRAMUSCULAR; INTRAVENOUS at 18:16

## 2025-05-05 RX ADMIN — SUGAMMADEX 100 MG: 100 INJECTION, SOLUTION INTRAVENOUS at 18:03

## 2025-05-05 RX ADMIN — ROCURONIUM 50 MG: 50 INJECTION, SOLUTION INTRAVENOUS at 14:32

## 2025-05-05 RX ADMIN — LORAZEPAM 2 MG: 1 TABLET ORAL at 22:28

## 2025-05-05 RX ADMIN — PROPOFOL 200 MG: 10 INJECTION, EMULSION INTRAVENOUS at 14:32

## 2025-05-05 RX ADMIN — GABAPENTIN 100 MG: 100 CAPSULE ORAL at 22:00

## 2025-05-05 RX ADMIN — ALBUMIN (HUMAN): 12.5 INJECTION, SOLUTION INTRAVENOUS at 17:41

## 2025-05-05 RX ADMIN — Medication 100 MCG: at 17:23

## 2025-05-05 RX ADMIN — LABETALOL HYDROCHLORIDE 5 MG: 5 INJECTION, SOLUTION INTRAVENOUS at 15:16

## 2025-05-05 RX ADMIN — PHENYLEPHRINE HYDROCHLORIDE 20 MCG/MIN: 10 INJECTION INTRAVENOUS at 16:39

## 2025-05-05 RX ADMIN — SUGAMMADEX 100 MG: 100 INJECTION, SOLUTION INTRAVENOUS at 18:06

## 2025-05-05 RX ADMIN — SODIUM CHLORIDE, SODIUM LACTATE, POTASSIUM CHLORIDE, AND CALCIUM CHLORIDE 125 ML/HR: .6; .31; .03; .02 INJECTION, SOLUTION INTRAVENOUS at 12:07

## 2025-05-05 RX ADMIN — ROCURONIUM 50 MG: 50 INJECTION, SOLUTION INTRAVENOUS at 16:15

## 2025-05-05 RX ADMIN — FENTANYL CITRATE 25 MCG: 50 INJECTION INTRAMUSCULAR; INTRAVENOUS at 19:22

## 2025-05-05 RX ADMIN — FENTANYL CITRATE 25 MCG: 50 INJECTION INTRAMUSCULAR; INTRAVENOUS at 19:05

## 2025-05-05 NOTE — ANESTHESIA POSTPROCEDURE EVALUATION
Post-Op Assessment Note    CV Status:  Stable    Pain management: adequate       Mental Status:  Alert and awake   Hydration Status:  Euvolemic   PONV Controlled:  Controlled   Airway Patency:  Patent     Post Op Vitals Reviewed: Yes    No anethesia notable event occurred.    Staff: Anesthesiologist           Last Filed PACU Vitals:  Vitals Value Taken Time   Temp 98.2 °F (36.8 °C) 05/05/25 1907   Pulse 80 05/05/25 1925   BP 83/49 05/05/25 1922   Resp 16 05/05/25 1907   SpO2 94 % 05/05/25 1925   Vitals shown include unfiled device data.    Modified Lou:     Vitals Value Taken Time   Activity 2 05/05/25 1907   Respiration 2 05/05/25 1907   Circulation 2 05/05/25 1907   Consciousness 2 05/05/25 1907   Oxygen Saturation 1 05/05/25 1907     Modified Lou Score: 9

## 2025-05-05 NOTE — OP NOTE
OPERATIVE REPORT  PATIENT NAME: Nancy Jones    :  1960  MRN: [id number]  Pt Location: [facility] OR ROOM 03    SURGERY DATE: 2025    Surgeons and Role:     * Jewel Rankin MD - Primary     * Tushar Guzman MD - Assisting    Preop Diagnosis:  Incisional hernia, without obstruction or gangrene [K43.2]    Post-Op Diagnosis Codes:     * Incisional hernia, without obstruction or gangrene [K43.2]    Procedure(s):  REPAIR HERNIA INCISIONAL 22 cm. retrorectus repair with Phasix mesh.  Unilateral myofascial release external oblique component seperation. TAP block    Specimen(s):  ID Type Source Tests Collected by Time Destination   A : URINALYSIS Urine Urine, Indwelling Fenton Catheter URINALYSIS WITH REFLEX TO SCOPE Jewel Rankin MD 2025 1434        Estimated Blood Loss:   Minimal    Drains:  Closed/Suction Drain Right RLQ Bulb 15 Fr. (Active)   Number of days: 0       Closed/Suction Drain Left LLQ Bulb 15 Fr. (Active)   Number of days: 0       Urethral Catheter Double-lumen;Latex 16 Fr. (Active)   Number of days: 0       Anesthesia Type:   General    Operative Indications:  Incisional hernia, without obstruction or gangrene [K43.2]      Operative Findings:    Prior to opening the fascia, or reducing the contents of the hernia, the hernia defect was measured. The defect measured 22 cm by 8 cm. This was repaired as described in the body of the report below.      Complications:   None    Procedure and Technique:  The patient was seen again in the Holding Room. The risks, benefits, complications, treatment options, and expected outcomes were discussed with the patient.  The patient and/or family concurred with the proposed plan, giving informed consent. The site of surgery properly noted/marked. The patient was taken to Operating Room, identified as Nancy Jones  and the procedure verified. A Time Out was held after prepping and draping in sterile fashion.  The above information was confirmed.    Prior to the  induction of general anesthesia, antibiotic prophylaxis was administered. General endotracheal anesthesia was then administered and tolerated well. After the induction, the abdomen was prepped in the usual sterile fashion.      A midline incision was made with a 15 blade scalpel through her old scar from some xiphoid down to infraumbilical.  Tissue resected Bovie cautery down to subcutaneous tissue.  The hernia sac identified and freed up from the subcutaneous tissue.  This dissection taken all the way down until the anterior fascia was identified.  The hernia sac was opened and transected on the midline and the fascia was opened in the midline from the most superior defect down to the most inferior defect.  Once this was opened up all adhesions that abdominal were carefully dissected freeing up all the adhesions.  Attention was now turned to the hernia sacs were dissected all the way down to the anterior fascia and then the anterior fascia was exposed although it lateral to beyond the rectus sheath anteriorly.  This is complete on both sides circumferentially.  Next attention was turned to the hernia sac was dissected off the anterior fascia into the preperitoneal plane and then the posterior fascia was opened up into the posterior rectus sheath.  This plane was opened up superiorly and inferiorly on either side and the dissection was continued out to the lateral edge of the rectus sheath posteriorly as well.  In the midline superiorly and inferiorly the 2 cut edges of the posterior sheath were kept in continuity with the preperitoneal space across the midline.  Next the posterior layer was reapproximated with running 0 Vicryl sutures in order to recreate the posterior layer.  The fascial defect was measured and it was 22 cm in length at least 8 cm in width.  It was felt that there was still undue tension in the anterior fascia despite dissection of the subcutaneous plane out laterally.  Therefore decision made to  do a unilateral myofascial release component separation on the right side and external bleak muscle.  The external bleak muscle fascia was opened up from the ASIS up to the costal margin and the fascia dissected off the muscle slightly to allow more retraction.  This allowed the anterior fascia to reapproximate without undue tension.  Next the mesh was placed in the posterior rectus sheath and it was measured and cut appropriately.  It was secured at the 4 corners with 0 Prolene sutures.  The anterior fascia was then closed over top of the mesh retrain the muscle back together.  The fascia is closed with interrupted figure-of-eight 0 Prolene sutures.  At this point now a tap block was performed with Exparel and Marcaine.  The injection was performed under direct visitation on either side of the midline.  The wound was irrigated with excellent hemostasis.  215 Juanito drains were passed out at a lower quadrant and wrapped inferiorly across to the other side and secured with 2-0 nylon sutures.  The midline was closed in layers of deep layer with 2-0 Vicryl suture followed by deep dermal 3-0 Vicryl sutures followed by staples.  Dressings were applied.           I was present for the entire procedure.    Patient Disposition:  PACU              SIGNATURE: Jewel Rankin MD  DATE: May 5, 2025  TIME: 6:08 PM

## 2025-05-05 NOTE — ANESTHESIA POSTPROCEDURE EVALUATION
Post-Op Assessment Note    CV Status:  Stable  Pain Score: 2    Pain management: adequate       Mental Status:  Alert and awake   Hydration Status:  Euvolemic   PONV Controlled:  Controlled   Airway Patency:  Patent     Post Op Vitals Reviewed: Yes    No anethesia notable event occurred.    Staff: Anesthesiologist, CRNA   Comments: Report given to recovering RN, VSS, Pt resting        Last Filed PACU Vitals:  Vitals Value Taken Time   Temp 98.2 °F (36.8 °C) 05/05/25 1830   Pulse 92 05/05/25 1831   /67 05/05/25 1830   Resp 16 05/05/25 1830   SpO2 94 % 05/05/25 1831   Vitals shown include unfiled device data.

## 2025-05-05 NOTE — INTERVAL H&P NOTE
H&P reviewed. After examining the patient I find no changes in the patients condition since the H&P had been written.    Vitals:    05/05/25 1209   BP: 109/51   Pulse: (!) 53   Resp: 16   Temp: (!) 96.8 °F (36 °C)   SpO2: 93%

## 2025-05-05 NOTE — ANESTHESIA PREPROCEDURE EVALUATION
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, bilateral legs ([facility])   (+) Carotid artery stenosis   (+) Chronic migraine w/o aura w/o status migrainosus, not intractable   (+) Essential hypertension   (+) Hypertensive heart disease with congestive heart failure (HCC)   (+) Left carotid stenosis   (+) 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HCC)   (+) Migraines   (+) Other chest pain   (+) Peripheral vascular disease (HCC)   (+) Stenosis of left carotid artery      GI/HEPATIC   (+) Gastroesophageal reflux disease without esophagitis   (+) Hepatic steatosis      /RENAL   (+) Stage 3 chronic kidney disease, unspecified whether stage 3a or 3b CKD ([facility])      NEURO/PSYCH   (+) Anxiety   (+) Chronic migraine w/o aura w/o status migrainosus, not intractable   (+) Migraines   (+) Paresthesia      PULMONARY   (+) COPD (chronic obstructive pulmonary disease) (HCC)   (+) CAMILLE (obstructive sleep apnea)   (+) Pharyngitis due to Streptococcus species        Physical Exam    Airway    Mallampati score: II  TM Distance: >3 FB  Neck ROM: full     Dental       Cardiovascular  Cardiovascular exam normal    Pulmonary  Pulmonary exam normal     Other Findings  post-pubertal.      Anesthesia Plan  ASA Score- 3     Anesthesia Type- general with ASA Monitors.         Additional Monitors:     Airway Plan: ETT.           Plan Factors-Exercise tolerance (METS): >4 METS.    Chart reviewed.   Existing labs reviewed. Patient summary reviewed.    Patient is not a current smoker.              Induction- intravenous.    Postoperative Plan-     Perioperative Resuscitation Plan - Level 1 - Full Code.       Informed Consent- Anesthetic plan and risks discussed with patient.        NPO Status:  No vitals data found for the desired time range.

## 2025-05-06 ENCOUNTER — HOME HEALTH ADMISSION (OUTPATIENT)
Dept: HOME HEALTH SERVICES | Facility: HOME HEALTHCARE | Age: 65
End: 2025-05-06
Payer: COMMERCIAL

## 2025-05-06 LAB
ANION GAP SERPL CALCULATED.3IONS-SCNC: 7 MMOL/L (ref 4–13)
BASOPHILS # BLD AUTO: 0.04 THOUSANDS/ÂΜL (ref 0–0.1)
BASOPHILS NFR BLD AUTO: 0 % (ref 0–1)
BUN SERPL-MCNC: 14 MG/DL (ref 5–25)
CALCIUM SERPL-MCNC: 8.6 MG/DL (ref 8.4–10.2)
CHLORIDE SERPL-SCNC: 104 MMOL/L (ref 96–108)
CO2 SERPL-SCNC: 27 MMOL/L (ref 21–32)
CREAT SERPL-MCNC: 0.89 MG/DL (ref 0.6–1.3)
EOSINOPHIL # BLD AUTO: 0 THOUSAND/ÂΜL (ref 0–0.61)
EOSINOPHIL NFR BLD AUTO: 0 % (ref 0–6)
ERYTHROCYTE [DISTWIDTH] IN BLOOD BY AUTOMATED COUNT: 12.9 % (ref 11.6–15.1)
GFR SERPL CREATININE-BSD FRML MDRD: 67 ML/MIN/1.73SQ M
GLUCOSE SERPL-MCNC: 191 MG/DL (ref 65–140)
GLUCOSE SERPL-MCNC: 192 MG/DL (ref 65–140)
GLUCOSE SERPL-MCNC: 219 MG/DL (ref 65–140)
GLUCOSE SERPL-MCNC: 258 MG/DL (ref 65–140)
GLUCOSE SERPL-MCNC: 265 MG/DL (ref 65–140)
GLUCOSE SERPL-MCNC: 288 MG/DL (ref 65–140)
GLUCOSE SERPL-MCNC: 354 MG/DL (ref 65–140)
HCT VFR BLD AUTO: 30.1 % (ref 34.8–46.1)
HGB BLD-MCNC: 10.1 G/DL (ref 11.5–15.4)
IMM GRANULOCYTES # BLD AUTO: 0.09 THOUSAND/UL (ref 0–0.2)
IMM GRANULOCYTES NFR BLD AUTO: 1 % (ref 0–2)
LYMPHOCYTES # BLD AUTO: 0.81 THOUSANDS/ÂΜL (ref 0.6–4.47)
LYMPHOCYTES NFR BLD AUTO: 6 % (ref 14–44)
MAGNESIUM SERPL-MCNC: 2.4 MG/DL (ref 1.9–2.7)
MCH RBC QN AUTO: 30.4 PG (ref 26.8–34.3)
MCHC RBC AUTO-ENTMCNC: 33.6 G/DL (ref 31.4–37.4)
MCV RBC AUTO: 91 FL (ref 82–98)
MONOCYTES # BLD AUTO: 0.74 THOUSAND/ÂΜL (ref 0.17–1.22)
MONOCYTES NFR BLD AUTO: 6 % (ref 4–12)
NEUTROPHILS # BLD AUTO: 10.88 THOUSANDS/ÂΜL (ref 1.85–7.62)
NEUTS SEG NFR BLD AUTO: 87 % (ref 43–75)
NRBC BLD AUTO-RTO: 0 /100 WBCS
PHOSPHATE SERPL-MCNC: 2.1 MG/DL (ref 2.3–4.1)
PLATELET # BLD AUTO: 222 THOUSANDS/UL (ref 149–390)
PMV BLD AUTO: 9.8 FL (ref 8.9–12.7)
POTASSIUM SERPL-SCNC: 3.9 MMOL/L (ref 3.5–5.3)
RBC # BLD AUTO: 3.32 MILLION/UL (ref 3.81–5.12)
SODIUM SERPL-SCNC: 138 MMOL/L (ref 135–147)
WBC # BLD AUTO: 12.56 THOUSAND/UL (ref 4.31–10.16)

## 2025-05-06 PROCEDURE — 97166 OT EVAL MOD COMPLEX 45 MIN: CPT

## 2025-05-06 PROCEDURE — 97162 PT EVAL MOD COMPLEX 30 MIN: CPT

## 2025-05-06 PROCEDURE — NC001 PR NO CHARGE: Performed by: SPECIALIST

## 2025-05-06 PROCEDURE — 80048 BASIC METABOLIC PNL TOTAL CA: CPT | Performed by: SURGERY

## 2025-05-06 PROCEDURE — 83735 ASSAY OF MAGNESIUM: CPT | Performed by: SURGERY

## 2025-05-06 PROCEDURE — 99222 1ST HOSP IP/OBS MODERATE 55: CPT | Performed by: INTERNAL MEDICINE

## 2025-05-06 PROCEDURE — 84100 ASSAY OF PHOSPHORUS: CPT | Performed by: SURGERY

## 2025-05-06 PROCEDURE — 85025 COMPLETE CBC W/AUTO DIFF WBC: CPT | Performed by: SURGERY

## 2025-05-06 PROCEDURE — 82948 REAGENT STRIP/BLOOD GLUCOSE: CPT

## 2025-05-06 RX ORDER — LANCETS 33 GAUGE
EACH MISCELLANEOUS
Qty: 100 EACH | Refills: 0 | Status: SHIPPED | OUTPATIENT
Start: 2025-05-06

## 2025-05-06 RX ORDER — LABETALOL HYDROCHLORIDE 5 MG/ML
10 INJECTION, SOLUTION INTRAVENOUS EVERY 4 HOURS PRN
Status: DISCONTINUED | OUTPATIENT
Start: 2025-05-06 | End: 2025-05-08 | Stop reason: HOSPADM

## 2025-05-06 RX ORDER — BLOOD-GLUCOSE METER
KIT MISCELLANEOUS
Qty: 1 KIT | Refills: 0 | Status: SHIPPED | OUTPATIENT
Start: 2025-05-06

## 2025-05-06 RX ORDER — BLOOD SUGAR DIAGNOSTIC
STRIP MISCELLANEOUS
Qty: 100 EACH | Refills: 0 | Status: SHIPPED | OUTPATIENT
Start: 2025-05-06

## 2025-05-06 RX ORDER — GLUCOSAMINE HCL/CHONDROITIN SU 500-400 MG
CAPSULE ORAL
Qty: 100 EACH | Refills: 0 | Status: SHIPPED | OUTPATIENT
Start: 2025-05-06

## 2025-05-06 RX ORDER — HEPARIN SODIUM 5000 [USP'U]/ML
5000 INJECTION, SOLUTION INTRAVENOUS; SUBCUTANEOUS EVERY 8 HOURS SCHEDULED
Status: COMPLETED | OUTPATIENT
Start: 2025-05-06 | End: 2025-05-06

## 2025-05-06 RX ORDER — INSULIN LISPRO 100 [IU]/ML
1-6 INJECTION, SOLUTION INTRAVENOUS; SUBCUTANEOUS
Status: DISCONTINUED | OUTPATIENT
Start: 2025-05-06 | End: 2025-05-08 | Stop reason: HOSPADM

## 2025-05-06 RX ADMIN — HEPARIN SODIUM 5000 UNITS: 5000 INJECTION, SOLUTION INTRAVENOUS; SUBCUTANEOUS at 14:10

## 2025-05-06 RX ADMIN — METHOCARBAMOL 500 MG: 500 TABLET ORAL at 05:08

## 2025-05-06 RX ADMIN — LORAZEPAM 2 MG: 1 TABLET ORAL at 05:58

## 2025-05-06 RX ADMIN — METOPROLOL SUCCINATE 100 MG: 100 TABLET, EXTENDED RELEASE ORAL at 21:57

## 2025-05-06 RX ADMIN — LORAZEPAM 2 MG: 1 TABLET ORAL at 14:11

## 2025-05-06 RX ADMIN — INSULIN LISPRO 3 UNITS: 100 INJECTION, SOLUTION INTRAVENOUS; SUBCUTANEOUS at 14:17

## 2025-05-06 RX ADMIN — INSULIN LISPRO 2 UNITS: 100 INJECTION, SOLUTION INTRAVENOUS; SUBCUTANEOUS at 18:54

## 2025-05-06 RX ADMIN — LORAZEPAM 2 MG: 1 TABLET ORAL at 22:56

## 2025-05-06 RX ADMIN — ACETAMINOPHEN 975 MG: 325 TABLET, FILM COATED ORAL at 05:08

## 2025-05-06 RX ADMIN — OXYCODONE 5 MG: 5 TABLET ORAL at 05:08

## 2025-05-06 RX ADMIN — PHENAZOPYRIDINE 200 MG: 100 TABLET ORAL at 12:12

## 2025-05-06 RX ADMIN — GABAPENTIN 100 MG: 100 CAPSULE ORAL at 08:45

## 2025-05-06 RX ADMIN — Medication 1 TABLET: at 17:18

## 2025-05-06 RX ADMIN — INSULIN LISPRO 6 UNITS: 100 INJECTION, SOLUTION INTRAVENOUS; SUBCUTANEOUS at 12:16

## 2025-05-06 RX ADMIN — APIXABAN 5 MG: 5 TABLET, FILM COATED ORAL at 12:13

## 2025-05-06 RX ADMIN — PHENAZOPYRIDINE 200 MG: 100 TABLET ORAL at 16:32

## 2025-05-06 RX ADMIN — HEPARIN SODIUM 5000 UNITS: 5000 INJECTION, SOLUTION INTRAVENOUS; SUBCUTANEOUS at 05:08

## 2025-05-06 RX ADMIN — OXYCODONE HYDROCHLORIDE 10 MG: 10 TABLET ORAL at 08:45

## 2025-05-06 RX ADMIN — FLUTICASONE FUROATE AND VILANTEROL TRIFENATATE 1 PUFF: 200; 25 POWDER RESPIRATORY (INHALATION) at 08:46

## 2025-05-06 RX ADMIN — APIXABAN 5 MG: 5 TABLET, FILM COATED ORAL at 21:57

## 2025-05-06 RX ADMIN — ACETAMINOPHEN 975 MG: 325 TABLET, FILM COATED ORAL at 14:10

## 2025-05-06 RX ADMIN — OXYCODONE 5 MG: 5 TABLET ORAL at 22:56

## 2025-05-06 RX ADMIN — SUCRALFATE 1 G: 1 TABLET ORAL at 17:18

## 2025-05-06 RX ADMIN — PANTOPRAZOLE SODIUM 40 MG: 40 TABLET, DELAYED RELEASE ORAL at 05:08

## 2025-05-06 RX ADMIN — METHOCARBAMOL 500 MG: 500 TABLET ORAL at 23:05

## 2025-05-06 RX ADMIN — METOPROLOL SUCCINATE 100 MG: 100 TABLET, EXTENDED RELEASE ORAL at 08:45

## 2025-05-06 RX ADMIN — OXYCODONE HYDROCHLORIDE 10 MG: 10 TABLET ORAL at 12:16

## 2025-05-06 RX ADMIN — METHOCARBAMOL 500 MG: 500 TABLET ORAL at 12:12

## 2025-05-06 RX ADMIN — SUCRALFATE 1 G: 1 TABLET ORAL at 08:45

## 2025-05-06 RX ADMIN — OXYCODONE HYDROCHLORIDE 10 MG: 10 TABLET ORAL at 18:54

## 2025-05-06 RX ADMIN — DILTIAZEM HYDROCHLORIDE 240 MG: 240 CAPSULE, EXTENDED RELEASE ORAL at 08:45

## 2025-05-06 RX ADMIN — PHENAZOPYRIDINE 200 MG: 100 TABLET ORAL at 08:45

## 2025-05-06 RX ADMIN — METHOCARBAMOL 500 MG: 500 TABLET ORAL at 17:18

## 2025-05-06 RX ADMIN — ACETAMINOPHEN 975 MG: 325 TABLET, FILM COATED ORAL at 21:57

## 2025-05-06 RX ADMIN — INSULIN LISPRO 3 UNITS: 100 INJECTION, SOLUTION INTRAVENOUS; SUBCUTANEOUS at 08:47

## 2025-05-06 NOTE — UTILIZATION REVIEW
Initial Clinical Review    Elective   IP   surgical procedure    Age/Sex: 65 y.o. female    Surgery Date:   5/5    Procedure: REPAIR HERNIA INCISIONAL 22 cm. retrorectus repair with Phasix mesh.  Unilateral myofascial release external oblique component seperation. TAP block     Anesthesia:   general    Operative Findings: Prior to opening the fascia, or reducing the contents of the hernia, the hernia defect was measured. The defect measured 22 cm by 8 cm.     POD#1 Progress Note:   5/6   Continue post op care.  Continue pain  control as needed.  Needs  PT/OT.   Tolerating diet.  +  flatus   -  BM  B/L  JENNIFER drain intact, 15  cc   noted  B/L  drains.  Hemoglobin this am  10.1      Admission Orders: Date/Time/Statement:   Admission Orders (From admission, onward)       Ordered        05/05/25 1347  Inpatient Admission  Once                          Orders Placed This Encounter   Procedures    Inpatient Admission     Standing Status:   Standing     Number of Occurrences:   1     Level of Care:   Med Surg [16]     Estimated length of stay:   Inpatient Only Surgery     Diet:   regular    Mobility:  PT/OT    DVT Prophylaxis:   SCD's    Medications/Pain Control:   Scheduled Medications:  acetaminophen, 975 mg, Oral, Q8H BONIFACIO  [Held by provider] apixaban, 5 mg, Oral, BID  diltiazem, 240 mg, Oral, Daily  fluticasone-vilanterol, 1 puff, Inhalation, Daily  gabapentin, 100 mg, Oral, TID  heparin (porcine), 5,000 Units, Subcutaneous, Q8H BONIFACIO  insulin lispro, 1-6 Units, Subcutaneous, 4 times day  methocarbamol, 500 mg, Oral, Q6H BONIFACIO  metoprolol succinate, 100 mg, Oral, BID  pantoprazole, 40 mg, Oral, Early Morning  phenazopyridine, 200 mg, Oral, TID With Meals  sucralfate, 1 g, Oral, BID      Continuous IV Infusions:     PRN Meds:  HYDROmorphone, 0.5 mg, Intravenous, Q3H PRN  LORazepam, 2 mg, Oral, Q8H PRN  oxyCODONE, 10 mg, Oral, Q4H PRN  oxyCODONE, 5 mg, Oral, Q4H PRN      Vital Signs (last 3 days)       Date/Time Temp Pulse Resp  BP MAP (mmHg) SpO2 Calculated FIO2 (%) - Nasal Cannula O2 Flow Rate (L/min) Nasal Cannula O2 Flow Rate (L/min) O2 Device Cardiac (WDL) Patient Position - Orthostatic VS Pain    05/06/25 0845 -- -- -- -- -- -- -- -- -- -- -- -- 7 05/06/25 0844 -- -- -- -- -- -- -- -- -- -- -- -- 7 05/06/25 0707 98.1 °F (36.7 °C) 78 18 124/67 85 87 % -- -- -- None (Room air) -- Lying --    05/06/25 0508 -- -- -- -- -- -- -- -- -- -- -- -- 6 05/06/25 0233 98.7 °F (37.1 °C) 83 18 109/59 70 90 % -- -- -- None (Room air) -- Lying --    05/05/25 2309 97.7 °F (36.5 °C) 85 20 128/67 96 93 % -- -- -- None (Room air) -- Lying --    05/05/25 2200 -- -- -- -- -- -- -- -- -- -- -- -- 6 05/05/25 2152 97.7 °F (36.5 °C) 87 20 117/64 80 94 % -- -- -- None (Room air) -- Lying --    05/05/25 2021 98.8 °F (37.1 °C) 85 20 125/52 69 90 % 28 -- 2 L/min Nasal cannula -- Lying --    05/05/25 1929 -- 82 -- 109/53 -- 93 % 32 -- 3 L/min Nasal cannula -- -- --    05/05/25 1922 -- -- -- -- -- -- -- -- -- -- -- -- 5 05/05/25 1916 -- -- -- -- -- -- -- -- -- -- -- -- 5 05/05/25 1907 98.2 °F (36.8 °C) 82 16 107/52 75 94 % 32 -- 3 L/min Nasal cannula WDL -- --    05/05/25 1900 -- -- -- -- -- -- -- -- -- -- -- -- 5    05/05/25 1844 -- 86 16 119/55 79 94 % -- 6 L/min -- Simple mask -- -- --    05/05/25 1830 98.2 °F (36.8 °C) 92 16 153/67 -- 94 % -- 6 L/min -- Simple mask -- -- --    05/05/25 1827 98.2 °F (36.8 °C) 92 16 153/67 -- 93 % -- 6 L/min -- Simple mask WDL -- No Pain    05/05/25 1405 -- -- -- -- -- -- -- -- -- -- -- -- 6    05/05/25 1209 96.8 °F (36 °C) 53 16 109/51 -- 93 % -- -- -- None (Room air) -- -- 6          Weight (last 2 days)       Date/Time Weight    05/05/25 2021 97.1 (214)    05/05/25 1209 97.1 (214.07)            Pertinent Labs/Diagnostic Test Results:   Radiology:      Results from last 7 days   Lab Units 05/06/25  0432 05/05/25 2048 04/29/25  1108   WBC Thousand/uL 12.56*  --  10.98*   HEMOGLOBIN g/dL 10.1*  --  13.4    HEMATOCRIT % 30.1*  --  40.0   PLATELETS Thousands/uL 222 204 297   TOTAL NEUT ABS Thousands/µL 10.88*  --  7.00         Results from last 7 days   Lab Units 05/06/25  0432 04/29/25  1108   SODIUM mmol/L 138 138   POTASSIUM mmol/L 3.9 3.4*   CHLORIDE mmol/L 104 101   CO2 mmol/L 27 27   ANION GAP mmol/L 7 10   BUN mg/dL 14 24   CREATININE mg/dL 0.89 0.94   EGFR ml/min/1.73sq m 67 62   CALCIUM mg/dL 8.6 9.1   MAGNESIUM mg/dL 2.4  --    PHOSPHORUS mg/dL 2.1*  --      Results from last 7 days   Lab Units 04/29/25  1108   AST U/L 13   ALT U/L 12   ALK PHOS U/L 98   TOTAL PROTEIN g/dL 7.1   ALBUMIN g/dL 4.6   TOTAL BILIRUBIN mg/dL 0.55     Results from last 7 days   Lab Units 05/06/25  0721   POC GLUCOSE mg/dl 258*     Results from last 7 days   Lab Units 05/06/25  0432 04/29/25  1108   GLUCOSE RANDOM mg/dL 288* 129         Results from last 7 days   Lab Units 04/29/25  1108   HEMOGLOBIN A1C % 6.9*   EAG mg/dl 151           Results from last 7 days   Lab Units 05/05/25  1434   CLARITY UA  Clear   COLOR UA  Dark Orange   SPEC GRAV UA  1.015   PH UA  6.5   GLUCOSE UA mg/dl 70 (7/100%)*   KETONES UA mg/dl Negative   BLOOD UA  Negative   PROTEIN UA mg/dl Negative   NITRITE UA  Positive*   BILIRUBIN UA  Negative   UROBILINOGEN UA (BE) mg/dl <2.0   LEUKOCYTES UA  Negative   WBC UA /hpf 1-2   RBC UA /hpf 1-2   BACTERIA UA /hpf None Seen   EPITHELIAL CELLS WET PREP /hpf None Seen   MUCUS THREADS  Occasional*         Network Utilization Review Department  ATTENTION: Please call with any questions or concerns to [phone] and carefully listen to the prompts so that you are directed to the right person. All voicemails are confidential.   For Discharge needs, contact Care Management DC Support Team at [phone] opt. [phone]  Send all requests for admission clinical reviews, approved or denied determinations and any other requests to dedicated fax number below belonging to the campus where the patient is receiving treatment. List of  dedicated fax numbers for the Facilities:  FACILITY NAME UR FAX NUMBER   ADMISSION DENIALS (Administrative/Medical Necessity) [phone]   DISCHARGE SUPPORT TEAM (NETWORK) [phone]   PARENT CHILD HEALTH (Maternity/NICU/Pediatrics) [phone]   [facility] [phone]   [facility] [phone]   [facility] [phone]   [facility] [phone]   [facility] [phone]   [facility] [phone]   [facility] [phone]   [facility] [phone]   [facility] [phone]   [facility] [phone]   [facility] [phone]   [facility] [phone]

## 2025-05-06 NOTE — PHYSICAL THERAPY NOTE
PHYSICAL THERAPY EVALUATION          Patient Name: Nancy Jones  Today's Date: 5/6/2025 05/06/25 0950   PT Last Visit   PT Visit Date 05/06/25   Note Type   Note type Evaluation   Pain Assessment   Pain Assessment Tool 0-10   Pain Score 8   Pain Location/Orientation Location: Abdomen   Hospital Pain Intervention(s) Emotional support;Rest   Restrictions/Precautions   Other Precautions Pain;Contact/isolation   Home Living   Type of Home Apartment   Home Layout One level;Stairs to enter with rails;Stairs to enter without rails   Bathroom Shower/Tub Tub/shower unit   Bathroom Toilet Standard   Bathroom Equipment Shower chair   Home Equipment Walker;Cane   Additional Comments 1 XIOMARA v 7 XIOMARA. can sleep in recliner if needed   Prior Function   Level of [place] Independent with ADLs;Independent with functional mobility;Independent with IADLS   Lives With Alone   Falls in the last 6 months 1 to 4   Comments indep without DME. no local support   General   Additional Pertinent History pt admitted 5/5/25 now POD#1 hernia repair. activity as tolerated orders   Cognition   Overall Cognitive Status WFL   Arousal/Participation Cooperative   Bed Mobility   Supine to Sit 4  Minimal assistance   Additional items Assist x 1;Bedrails;Increased time required;Verbal cues   Additional Comments cues for rolling technique to get OOB   Transfers   Sit to Stand 5  Supervision   Stand to Sit 5  Supervision   Ambulation/Elevation   Gait pattern Excessively slow;Antalgic   Gait Assistance 5  Supervision   Assistive Device None   Distance 90'   Balance   Static Standing Fair   Dynamic Standing Fair -   Ambulatory Fair   Endurance Deficit   Endurance Deficit No   Activity Tolerance   Activity Tolerance Patient tolerated treatment well;Patient limited by pain   Medical Staff Made Aware OT   Nurse Made Aware yes   Assessment   Prognosis Good   Assessment Nancy Jones is a 65 y.o. female admitted to [facility] on 5/5/2025 for  <principal problem not specified>. POD#1 incisional hernia repair w/ retrorectus phasix mesh, unilateral external oblique release. PT was consulted and pt was seen on 5/6/2025 for mobility assessment and d/c planning. Pt presents w high fall risk, contact isolation, pain. At baseline is indep without an AD. Pt is currently functioning at a S for transfers and ambulation. Pt demonstrated ability to ambulate unlimited household distances. Occasionally limited by pain, especially to get OOB, however plans to sleep in recliner upon return to home. Primary factor limiting activity tolerance is pain. Mobility likely to continue to improve w pain management. No further acute PT needs. Pt will benefit from continued mobilization via nsg/restorative.   Barriers to Discharge None   Plan   PT Frequency   (d/c PT; maintain on restorative)   Discharge Recommendation   Rehab Resource Intensity Level, PT No post-acute rehabilitation needs   AM-PAC Basic Mobility Inpatient   Turning in Flat Bed Without Bedrails 3   Lying on Back to Sitting on Edge of Flat Bed Without Bedrails 3   Moving Bed to Chair 3   Standing Up From Chair Using Arms 3   Walk in Room 3   Climb 3-5 Stairs With Railing 3   Basic Mobility Inpatient Raw Score 18   Basic Mobility Standardized Score 41.05   [facility] Highest Level Of Mobility   -HLM Goal 6: Walk 10 steps or more   -HLM Achieved 7: Walk 25 feet or more   End of Consult   Patient Position at End of Consult Bedside chair;All needs within reach     History: co - morbidities including age, social background, current experience including fall risk, contact isolation  Exam: impairments in systems including multiple body structures involved; neuromuscular (balance, gait, transfers); activity limitations  (difficulties executing an action); participation restrictions (problems associated w involvement in life situations), am-pac  Clinical: stable/unpredictable  Complexity: moderate    Ellen Stoner  PT

## 2025-05-06 NOTE — CASE MANAGEMENT
Case Management Assessment & Discharge Planning Note    Patient name Nancy Jones  Location [facility] /E2 -* MRN [id number]  : 1960 Date 2025       Current Admission Date: 2025  Current Admission Diagnosis:Incisional hernia, without obstruction or gangrene  Patient Active Problem List    Diagnosis Date Noted Date Diagnosed    Pharyngitis due to Streptococcus species 2025     Rib contusion, right, subsequent encounter 2025     Financial difficulties 2024     Shingles 2024     Diabetes due to undrl condition w h diabetic neuro comp ([facility]) 2024     Folliculitis 2024     Complex tear of lateral meniscus of left knee as current injury, initial encounter 2024     Opioid dependence, uncomplicated ([facility]) 2024     Obesity, morbid ([facility]) 2024     Acute lateral meniscus tear of left knee 2024     Acute pain of left knee 2024     Left facial numbness 2024     Other chest pain 2024     Ventral hernia without obstruction or gangrene 2024     Atherosclerosis of native arteries of extremities with rest pain, bilateral legs ([facility]) 2024     Acute on chronic diastolic (congestive) heart failure ([facility]) 2024     Incisional hernia, without obstruction or gangrene 2023     Stage 3 chronic kidney disease, unspecified whether stage 3a or 3b CKD ([facility]) 2023     Dysuria 2023     Omental infarction ([facility]) 2023     Hypokalemia 2023     Night sweats 2023     S/P small bowel resection 2023     Anxiety 2023     Acute postoperative pain 2023     Colitis 2023     Chronic migraine w/o aura w/o status migrainosus, not intractable 2023     IIH (idiopathic intracranial hypertension) 2023     Hematochezia 2023     Left carotid stenosis 2023     Colonic ischemia ([facility]) 2023     Paresthesia 2023     Injury of left facial nerve 2023      Partial facial nerve palsy 02/01/2023     Hepatic steatosis 12/19/2022     Asymptomatic stenosis of left carotid artery 10/05/2022     Stenosis of left carotid artery 09/06/2022     Carotid artery stenosis 09/02/2022     Appendix disease 04/29/2022     Urinary retention 03/17/2022     Peripheral vascular disease (HCC) 03/17/2022     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     COPD (chronic obstructive pulmonary disease) (HCC) 08/19/2021     Unspecified diastolic (congestive) heart failure (HCC) 03/12/2021     Hypertensive heart disease with congestive heart failure (HCC) 12/21/2020     CAMILLE (obstructive sleep apnea)      Lumbar radiculopathy 10/01/2020     Wheezing 09/21/2020     A-fib (HCC) 03/19/2020     Angio-edema 01/22/2020     Gastroesophageal reflux disease without esophagitis 08/12/2019     Allergic reaction 12/03/2018     AMD (age-related macular degeneration), bilateral 11/16/2018     Angina pectoris ([facility]) 11/15/2017     Migraines 10/14/2016     Essential hypertension 08/13/2015       LOS (days): 1  Geometric Mean LOS (GMLOS) (days): 2  Days to GMLOS:1.1     OBJECTIVE:    Risk of Unplanned Readmission Score: 25.03         Current admission status: Inpatient       Preferred Pharmacy:   [id number] [place] - [address] [place] [address]  Phone: [phone] Fax: [phone]    [id number] [place] - [address]  [address]  [place] [address]  Phone: [phone] Fax: [phone]    Primary Care Provider: Barber Ayoub DO    Primary Insurance: [facility] REP  Secondary Insurance:     ASSESSMENT:  Active Health Care Proxies    There are no active Health Care Proxies on file.                      Patient Information  Admitted from:: Home  Mental Status: Alert  During Assessment patient was accompanied by: Not accompanied during assessment  Assessment information provided by::  Patient  Primary Caregiver: Self  Support Systems: Self  [facility] of Residence: [place]  What city do you live in?: [place]  Home entry access options. Select all that apply.: Stairs  Number of steps to enter home.: 1  Do the steps have railings?: No  Type of Current Residence: Apartment  Floor Level: 1  Upon entering residence, is there a bedroom on the main floor (no further steps)?: Yes  Upon entering residence, is there a bathroom on the main floor (no further steps)?: Yes  Living Arrangements: Lives Alone  Is patient a ?: No    Activities of Daily Living Prior to Admission  Functional Status: Independent  Completes ADLs independently?: Yes  Ambulates independently?: Yes  Does patient use assisted devices?: No  Does patient currently own DME?: Yes  What DME does the patient currently own?: Walker, Straight Cane  Does patient have a history of Outpatient Therapy (PT/OT)?: No  Does the patient have a history of Short-Term Rehab?: No  Does patient have a history of HHC?: Yes ([facility])         Patient Information Continued  Does patient have prescription coverage?: Yes  Can the patient afford their medications and any related supplies (such as glucometers or test strips)?: Yes  Does patient receive dialysis treatments?: No  Does patient have a history of substance abuse?: No  Does patient have a history of Mental Health Diagnosis?: No         Means of Transportation  Means of Transport to Appts:: Drives Self          DISCHARGE DETAILS:                           Contacts  Patient Contacts: Patient  Contact Method: In Person  Reason/Outcome: Referral, Discharge Planning    Requested Home Health Care         Is the patient interested in HHC at discharge?: Yes  Home Health Discipline requested:: Nursing, Occupational Therapy, Physical Therapy  Supporting Clincal Findings:: Limited Endurance, Fatigues Easliy in Short Distances    DME Referral Provided  Referral made for DME?: No    Other  Referral/Resources/Interventions Provided:  Interventions: HHC                                             IMM Given (Date):: 05/06/25  IMM Given to:: Patient     Additional Comments: Pt lives in 1st floor apt with 1 XIOMARA. Pt lives alone and performs ADLs indep.  Pt drives to appts. CM was consulted for post acute needs. CM did meet with pt at bedside and pt expressed interest in [facility]. Referral sent to [facility] at pt request.  IMM was reviewed and patient expressed understanding. PT did report losing her keys to her apt and car prior to coming in for her procedure but she has [place] looking for them and reports has an extra car key at home and does have access to her apt through her a manager next door.  CM will continue to provide support until d/c. Pt will need transportation at d/c.

## 2025-05-06 NOTE — OCCUPATIONAL THERAPY NOTE
"    Occupational Therapy Evaluation     Patient Name: Nancy Jones  Today's Date: 5/6/2025  Problem List  Active Problems:    Incisional hernia, without obstruction or gangrene    Past Medical History  Past Medical History:   Diagnosis Date    A-fib ([facility]) 03/2020    Allergic     Anxiety     Arthritis     Asthma     Back pain     and muscle pain    Bruises easily     Chronic pain disorder     Interstitial pain    Colon polyp     Dental bridge present     Depression     Eczema     GERD (gastroesophageal reflux disease)     Heart murmur     History of blood clots     per pt \"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\"    History of pneumonia     Hives     Hyperlipidemia     Hypertension     Infertility, female     Interstitial cystitis     Migraine     sees neurologist    Motion sickness     Obesity     Palpitations     PONV (postoperative nausea and vomiting)     Premature atrial contractions 11/09/2022    Psychiatric disorder     Sleep apnea     TIA (transient ischemic attack) 09/2022    per pt --\"had MRI and 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\"    Urinary incontinence     wears Pads    Venous insufficiency 08/01/2017    Wears glasses      Past Surgical History  Past Surgical History:   Procedure Laterality Date    COLONOSCOPY      DILATION AND CURETTAGE OF UTERUS      EGD      EXPLORATORY LAPAROTOMY      for infertility    EXPLORATORY LAPAROTOMY W/ BOWEL RESECTION N/A 7/19/2023    Procedure: LAPAROTOMY EXPLORATORY W/ BOWEL RESECTION;  Surgeon: Crista Recinos MD;  [facility];  Service: General    HAND SURGERY      Hand excision of tendon cyst    SD LAPAROSCOPIC APPENDECTOMY N/A 05/03/2022    Procedure: APPENDECTOMY LAPAROSCOPIC;  Surgeon: Vin Fields MD;  Location: [facility];  Service: General    SD LAPS ABD PRTM&OMENTUM DX W/WO SPEC BR/WA SPX N/A 7/19/2023    Procedure: LAPAROSCOPY DIAGNOSTIC, LYSIS OF ADHESIONS, LAPAROSCOPY TAKE TAKEDOWN OF LEFT COLON, EXPLORATORY LAPAROSCOPY, LYSIS " OF ADHESIONS, RESECTION OF TRANSVERSE COLON SPLENIC FLEXURE AND DESCENDING COLON , TAKE DOWN OF SPLENIC FLEXURE, SIGMOIDOSCOPY, MOBILIZATION OF RIGHT COLON, PRIMARY ANASTOMOSIS EEA 29, TAP BLOCK;  Surgeon: Crista Recinos MD;  [facility];  Service: General    AZ RPR AA HERNIA RECR > 10 CM REDUCIBLE N/A 5/5/2025    Procedure: REPAIR HERNIA INCISIONAL 20 cm, retro rectus repair with Phasic mesh.  Unilateral myofascial release external oblique component seperation. TAP block;  Surgeon: Jewel Rankin MD;  [facility];  Service: General    AZ TCAT IV STENT CRV CRTD ART EMBOLIC PROTECJ Left 4/29/2024    Procedure: ANGIOPLASTY ARTERY CAROTID W/ STENT;  Surgeon: Roberto García DO;  [facility];  Service: Vascular    AZ TEAEC W/PATCH GRF CAROTID VERTB SUBCLAV NECK INC Left 1/31/2023    Procedure: EXPLORATION OF LEFT CAROTID ARTERY; ABORTED ENDARTERECTOMY ARTERY CAROTID;  Surgeon: Roberto García DO;  [facility];  Service: Vascular    SUPERIOR MESTENTERIC ARTERY STENT      WISDOM TOOTH EXTRACTION             05/06/25 0934   Note Type   Note type Evaluation   Pain Assessment   Pain Assessment Tool 0-10   Pain Score 2  (supine)   Pain Location/Orientation Location: Abdomen   Restrictions/Precautions   Weight Bearing Precautions Per Order No   Braces or Orthoses   (abdominal binder)   Other Precautions Contact/isolation;Pain   Home Living   Type of Home Apartment   Home Layout One level   Bathroom Shower/Tub Tub/shower unit   Bathroom Toilet Standard   Bathroom Equipment Shower chair   Home Equipment Walker;Cane   Prior Function   Level of [place] Independent with ADLs;Independent with functional mobility   Lives With Alone   Falls in the last 6 months 1 to 4  (2)   Lifestyle   Autonomy PTA pt states independence with her ADLs, transfers, ambulation--w/o device; +, +falls   Reciprocal Relationships limited family/friend support   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   Intrinsic Gratification watching TV   Subjective   Subjective \"It's a little sore when I cough.\"   ADL   Where Assessed Edge of bed   Eating Assistance 6  Modified independent   Grooming Assistance 6  Modified Independent   UB Bathing Assistance 6  Modified Independent   LB Bathing Assistance 5  Supervision/Setup   UB Dressing Assistance 6  Modified independent   LB Dressing Assistance 5  Supervision/Setup   Toileting Assistance  6  Modified independent   Bed Mobility   Rolling R 5  Supervision   Rolling L 5  Supervision   Supine to Sit 4  Minimal assistance   Additional items Assist x 1;Increased time required;Verbal cues;LE management   Transfers   Sit to Stand 5  Supervision   Stand to Sit 5  Supervision   Functional Mobility   Functional Mobility 5  Supervision   Additional items   (w/o device)   Balance   Static Sitting Normal   Dynamic Sitting Good   Static Standing Fair   Dynamic Standing Fair -   Activity Tolerance   Activity Tolerance Patient limited by pain;Patient tolerated treatment well   Medical Staff Made Aware nsg, P.T., CM   RUE Assessment   RUE Assessment WFL   RUE Strength   RUE Overall Strength Within Functional Limits - able to perform ADL tasks with strength  (4/5 throughout--limited MMT 2* c/o increased abdominal pain)   LUE Assessment   LUE Assessment WFL   LUE Strength   LUE Overall Strength Within Functional Limits - able to perform ADL tasks with strength  (4/5 throughout--limited MMT 2* c/o increased abdominal pain)   Hand Function   Gross Motor Coordination Functional   Fine Motor Coordination Functional   Sensation   Light Touch No apparent deficits   Proprioception   Proprioception No apparent deficits   Vision-Basic Assessment   Current Vision   (glasses)   Vision - Complex Assessment   Acuity Able to read clock/calendar on wall without difficulty   Psychosocial   Psychosocial (WDL) WDL   Perception   Inattention/Neglect Appears intact   Cognition   Overall Cognitive " "Status WFL   Arousal/Participation Alert   Attention Within functional limits   Orientation Level Oriented X4   Memory Within functional limits   Following Commands Follows all commands and directions without difficulty   Assessment   Limitation Decreased ADL status;Decreased UE strength;Decreased Safe judgement during ADL;Decreased high-level ADLs;Decreased endurance   Prognosis Good   Assessment Pt is a 64y/o female admitted to the hospital for an elected s/p REPAIR HERNIA INCISIONAL 20 cm, retro rectus repair with Phasic mesh; unilateral myofascial release external oblique component separation (5/5). Pt with PMH A-fib, anxiety, back pa in, HTN, TIA, exp lap. PTA pt states independence with her ADLs, transfers, ambulation--w/o device; +, +falls. During initial eval, pt demonstrated slight deficits with her functional balance, functional mobility, ADL status, and activity tolerance(currently fair=15-20mins). Pt would benefit from continued OT tx for the above deficits.1-5 OT tx sessions.   The patient's raw score on the AM-PAC Daily Activity Inpatient Short Form is 22. A raw score of greater than or equal to 19 suggests the patient may benefit from discharge to home. Please refer to the recommendation of the Occupational Therapist for safe discharge planning.   Goals   Patient Goals \"to go home\"   STG Time Frame   (1-7 days)   Short Term Goal #1 Pt will demonstrate improved activity tolerance to good(20-30mins) and standing tolerance to 3-5mins to assist with ADLs.   Short Term Goal #2 Pt will demonstrate mod I with their bed mobility to facilitate EOB ADLs.   Short Term Goal  Pt will demonstrate mod I with their sit-stand transfers to assist with completion of their LE dressing.   Long Term Goal #1 Pt will demonstrate g/g- balance with all functional activities.   Long Term Goal #2 Pt will demonstrate mod I with their UE and LE bathing/dresssing.   Long Term Goal Pt will demonstrate proper transfer safety " 100% of the time.   Plan   Treatment Interventions ADL retraining;Functional transfer training;Endurance training;Patient/family training;Compensatory technique education   Goal Expiration Date 05/13/25   OT Treatment Day 0   OT Frequency   (1-5 OT tx sessions)   Discharge Recommendation   Rehab Resource Intensity Level, OT No post-acute rehabilitation needs   AM-PAC Daily Activity Inpatient   Lower Body Dressing 3   Bathing 3   Toileting 4   Upper Body Dressing 4   Grooming 4   Eating 4   Daily Activity Raw Score 22   Daily Activity Standardized Score (Calc for Raw Score >=11) 47.1   AM-PAC Applied Cognition Inpatient   Following a Speech/Presentation 4   Understanding Ordinary Conversation 4   Taking Medications 4   Remembering Where Things Are Placed or Put Away 4   Remembering List of 4-5 Errands 3   Taking Care of Complicated Tasks 3   Applied Cognition Raw Score 22   Applied Cognition Standardized Score 47.83   Manuel Lainez        Name band;

## 2025-05-06 NOTE — QUICK NOTE
Post Op Check:    65 y.o. female * Day of Surgery * s/p incisional hernia rectrorectus repair w/ phasix mesh.  Patients pain is well controlled. They denied any nausea, chest pain, or shortness of breath.    Temp:  [96.8 °F (36 °C)-98.8 °F (37.1 °C)] 98.8 °F (37.1 °C)  HR:  [53-92] 85  BP: (107-153)/(51-67) 125/52  Resp:  [16-20] 20  SpO2:  [90 %-94 %] 90 %  O2 Device: Nasal cannula  Nasal Cannula O2 Flow Rate (L/min):  [2 L/min-3 L/min] 2 L/min      Physical Exam:  General: No acute distress, alert and oriented  CV: Well perfused, regular rate  Lungs: Normal work of breathing, no increased respiratory effort  Abdomen: Soft, appropriately tender, non-distended. Incision(s) clean, dry and intact.  Extremities: No edema, clubbing or cyanosis  Skin: Warm, dry      Plan:  Diet Regular; Regular House  Continue to monitor  Pain and nausea control PRN    Tushra Guzman MD  General Surgery   05/05/25

## 2025-05-06 NOTE — ASSESSMENT & PLAN NOTE
Lab Results   Component Value Date    HGBA1C 6.9 (H) 04/29/2025       Recent Labs     05/06/25  0721 05/06/25  1116 05/06/25  1417 05/06/25  1617   POCGLU 258* 354* 265* 191*       Blood Sugar Average: Last 72 hrs:  (P) 267    Pt has diabetes likely due to recent hernia repair and use of decadron  Ideally would like to start metformin but pt has a lot of gi upset  Will trial januvia  Could consider jardiance in future if januvia is not tolerated  Referral to nutritionist  She is working on stopping decadron and starting topomax for headaches  Also will require glucometer which was added to discharge paperwork   Repeat A1c in 3 to 4 months

## 2025-05-06 NOTE — PLAN OF CARE
Problem: Knowledge Deficit  Goal: Patient/family/caregiver demonstrates understanding of disease process, treatment plan, medications, and discharge instructions  Description: Complete learning assessment and assess knowledge base.Interventions:- Provide teaching at level of understanding- Provide teaching via preferred learning methods  Outcome: Adequate for Discharge     Problem: GASTROINTESTINAL - ADULT  Goal: Maintains or returns to baseline bowel function  Description: INTERVENTIONS:- Assess bowel function- Encourage oral fluids to ensure adequate hydration- Administer IV fluids if ordered to ensure adequate hydration- Administer ordered medications as needed- Encourage mobilization and activity- Consider nutritional services referral to assist patient with adequate nutrition and appropriate food choices  Outcome: Adequate for Discharge     Problem: METABOLIC, FLUID AND ELECTROLYTES - ADULT  Goal: Electrolytes maintained within normal limits  Description: INTERVENTIONS:- Monitor labs and assess patient for signs and symptoms of electrolyte imbalances- Administer electrolyte replacement as ordered- Monitor response to electrolyte replacements, including repeat lab results as appropriate- Instruct patient on fluid and nutrition as appropriate  Outcome: Adequate for Discharge  Goal: Fluid balance maintained  Description: INTERVENTIONS:- Monitor labs - Monitor I/O and WT- Instruct patient on fluid and nutrition as appropriate- Assess for signs & symptoms of volume excess or deficit  Outcome: Adequate for Discharge     Problem: SKIN/TISSUE INTEGRITY - ADULT  Goal: Incision(s), wounds(s) or drain site(s) healing without S/S of infection  Description: INTERVENTIONS- Assess and document dressing, incision, wound bed, drain sites and surrounding tissue- Provide patient and family education- Perform skin care/dressing changes  Outcome: Adequate for Discharge

## 2025-05-06 NOTE — PLAN OF CARE
Problem: OCCUPATIONAL THERAPY ADULT  Goal: Performs self-care activities at highest level of function for planned discharge setting.  See evaluation for individualized goals.  Description: Treatment Interventions: ADL retraining, Functional transfer training, Endurance training, Patient/family training, Compensatory technique education          See flowsheet documentation for full assessment, interventions and recommendations.   Note: Limitation: Decreased ADL status, Decreased UE strength, Decreased Safe judgement during ADL, Decreased high-level ADLs, Decreased endurance  Prognosis: Good  Assessment: Pt is a 66y/o female admitted to the hospital for an elected s/p REPAIR HERNIA INCISIONAL 20 cm, retro rectus repair with Phasic mesh; unilateral myofascial release external oblique component separation (5/5). Pt with PMH A-fib, anxiety, back pa in, HTN, TIA, exp lap. PTA pt states independence with her ADLs, transfers, ambulation--w/o device; +, +falls. During initial eval, pt demonstrated slight deficits with her functional balance, functional mobility, ADL status, and activity tolerance(currently fair=15-20mins). Pt would benefit from continued OT tx for the above deficits.1-5 OT tx sessions.   The patient's raw score on the AM-PAC Daily Activity Inpatient Short Form is 22. A raw score of greater than or equal to 19 suggests the patient may benefit from discharge to home. Please refer to the recommendation of the Occupational Therapist for safe discharge planning.     Rehab Resource Intensity Level, OT: No post-acute rehabilitation needs

## 2025-05-06 NOTE — PROGRESS NOTES
Patient:    MRN:  [id number]    Kristina Request ID:  [id number]    Level of care reserved:  Home Health Agency    Partner Reserved:  [facility], [place] [address] [phone]    Clinical needs requested:    Geography searched:  [id number]    Start of Service:    Request sent:  12:07pm EDT on 5/6/2025 by Marilee Tejada    Partner reserved:  12:46pm EDT on 5/6/2025 by Marilee Tejada    Choice list shared:  12:46pm EDT on 5/6/2025 by Marilee Tejada

## 2025-05-06 NOTE — PROGRESS NOTES
"Progress Note - Surgery-General   Name: Nancy Jones 65 y.o. female I MRN: [id number]  Unit/Bed#: E2 -01 I Date of Admission: 5/5/2025   Date of Service: 5/6/2025 I Hospital Day: 1     Assessment & Plan  Incisional hernia, without obstruction or gangrene  65-year-old female s/p open ventral hernia repair with retrorectus phasic mesh, and unilateral external oblique release on 5/5. SLIM recommending trial of Januvia for elevated glucose and referral to nutritionist.    Right JENNIFER drain: 150 cc serosanguineous  Left JENNIFER drain: 330cc serosanguineous    Plan  -F/u CM VNA  -Regular diet  -Monitor JENNIFER drain output and character  -SSI  -D/c IV fluids  -Prn analgesia and antiemetics  -PT/OT  -Restarted on Eliquis   -D/C within the next 24-48 hours    24 Hour Events : Hypertensive overnight. Given HTN meds with return to baseline   Subjective : Patient with moderate to severe pain overnight.  She describes 9 out of 10 pain this morning located near the incision sites and JENNIFER drains.  She denies any nausea, vomiting, fevers or chills overnight.    Objective :  Visit Vitals  /73 (BP Location: Left arm)   Pulse 70   Temp 97.9 °F (36.6 °C) (Oral)   Resp 18   Ht 5' 4\" (1.626 m)   Wt 97.1 kg (214 lb)   LMP  (LMP Unknown)   SpO2 90%   BMI 36.73 kg/m²   OB Status Postmenopausal   Smoking Status Former   BSA 2.01 m²        Physical Exam  Constitutional:       General: She is not in acute distress.     Appearance: Normal appearance. She is not ill-appearing, toxic-appearing or diaphoretic.   Cardiovascular:      Rate and Rhythm: Normal rate and regular rhythm.      Pulses: Normal pulses.      Heart sounds: Normal heart sounds. No murmur heard.     No gallop.   Pulmonary:      Effort: Pulmonary effort is normal. No respiratory distress.      Breath sounds: Normal breath sounds. No wheezing.   Abdominal:      General: Bowel sounds are normal. There is no distension (Appropriately distended postoperatively).      Palpations: " Abdomen is soft. There is no mass.      Tenderness: There is abdominal tenderness (Appropriate amount of tenderness to palpation near the drain and incision sites.  Incision sites are clean dry and intact without any surrounding erythema, or swelling.  Appropriate amount of ecchymosis surrounding the incision sites.). There is no right CVA tenderness, left CVA tenderness, guarding or rebound.      Hernia: No hernia is present.   Neurological:      Mental Status: She is alert.         Lab Results: I have reviewed the following results:  Recent Labs     05/06/25  0432 05/07/25  0426   WBC 12.56* 10.49*   HGB 10.1* 9.6*    232   SODIUM 138 140   K 3.9 4.2    106   CO2 27 29   BUN 14 22   CREATININE 0.89 0.82   GLUC 288* 178*   CALCIUM 8.6 8.4   MG 2.4  --    PHOS 2.1* 3.0          VTE Pharmacologic Prophylaxis: VTE covered by:  apixaban, Oral, 5 mg at 05/06/25 1213     VTE Mechanical Prophylaxis: sequential compression device

## 2025-05-06 NOTE — ASSESSMENT & PLAN NOTE
Lab Results   Component Value Date    EGFR 67 05/06/2025    EGFR 62 04/29/2025    EGFR 68 01/22/2025    CREATININE 0.89 05/06/2025    CREATININE 0.94 04/29/2025    CREATININE 0.88 01/22/2025   Creatinine stable at baseline

## 2025-05-06 NOTE — ASSESSMENT & PLAN NOTE
65-year-old female s/p open ventral hernia repair with retrorectus phasic mesh, and unilateral external oblique release on 5/5. SLIM recommending trial of Januvia for elevated glucose and referral to nutritionist.    Right JENNIFER drain: 150 cc serosanguineous  Left JENNIFER drain: 330cc serosanguineous    Plan  -F/u CM VNA  -Regular diet  -Monitor JENNIFER drain output and character  -SSI  -D/c IV fluids  -Prn analgesia and antiemetics  -PT/OT  -Restarted on Eliquis   -D/C within the next 24-48 hours

## 2025-05-06 NOTE — ASSESSMENT & PLAN NOTE
64yo F with hx of Afib on Eliquis, large incisional hernia s/p incisional hernia repair w/ retrorectus phasix mesh, unilateral external oblique release on 5/5.    Plan  Regular diet  Monitor JENNIFER drain output and character  SSI  D/c IV fluids  Prn analgesia and antiemetics  PT/OT  CM consult  Plan to restart Eliquis 5/6 PM if not discharged today

## 2025-05-06 NOTE — ASSESSMENT & PLAN NOTE
She states she has stopped Fioricet  Migraines have been less often but she uses decadron prn. Decadron use has been approx 3 to 4 times this year. Likely contributing to diabetes  Pt will consider d/c of decadron use and switch to topamax

## 2025-05-06 NOTE — CONSULTS
Consultation - Hospitalist   Name: Nancy Jones 65 y.o. female I MRN: [id number]  Unit/Bed#: E2 -01 I Date of Admission: 5/5/2025   Date of Service: 5/6/2025 I Hospital Day: 1   Inpatient consult to Internal Medicine  Consult performed by: Mariel Ramon DO  Consult ordered by: Rita Pollock PA-C        Physician Requesting Evaluation: Jewel Rankin MD   Reason for Evaluation / Principal Problem: elevated a1c    Assessment & Plan  Essential hypertension  Bp stable  Continue metoprolol and cardizem   Migraines  She states she has stopped Fioricet  Migraines have been less often but she uses decadron prn. Decadron use has been approx 3 to 4 times this year. Likely contributing to diabetes  Pt will consider d/c of decadron use and switch to topamax   COPD (chronic obstructive pulmonary disease) ([facility])  Currently without exacerbation   Continue fluticasone- vilanterol  Incisional hernia, without obstruction or gangrene  S/p incision hernia repair with retrorectus phasix mesh on 5/5  Per primary team   Stage 3 chronic kidney disease, unspecified whether stage 3a or 3b CKD ([facility])  Lab Results   Component Value Date    EGFR 67 05/06/2025    EGFR 62 04/29/2025    EGFR 68 01/22/2025    CREATININE 0.89 05/06/2025    CREATININE 0.94 04/29/2025    CREATININE 0.88 01/22/2025   Creatinine stable at baseline   Diabetes due to undrl condition w oth diabetic neuro comp ([facility])  Lab Results   Component Value Date    HGBA1C 6.9 (H) 04/29/2025       Recent Labs     05/06/25  0721 05/06/25  1116 05/06/25  1417 05/06/25  1617   POCGLU 258* 354* 265* 191*       Blood Sugar Average: Last 72 hrs:  (P) 267    Pt has diabetes likely due to recent hernia repair and use of decadron  Ideally would like to start metformin but pt has a lot of gi upset  Will trial januvia  Could consider jardiance in future if januvia is not tolerated  Referral to nutritionist  She is working on stopping decadron and starting topomax for headaches  Also will  "require glucometer which was added to discharge paperwork   Repeat A1c in 3 to 4 months    History of Present Illness   Chief Complaint: consult for elevated A1c     Nancy Joens is a 65 y.o. female with a PMH of hernia, migraines, htn, ckd3, copd who is s/p incisional hernia repair. Her A1c is 6.9 and her blood glucose has been above 200. Pt has been taking decadron about 3 times a year for migraines. She has been very hesitant to start any diabetic medications. Discussed in full the implications of having hyperglycemia with wound healing, kidney disease, and bowel issues. She admits she has nausea on a daily basis. She states she also is considering stopping her decadron and starting Topamax that had been recommended for her. She does not have a glucometer at home or had nutritional education about diabetes. She has some abd pain but no f/c no cp no sob no n/v    Review of Systems   Constitutional:  Positive for activity change.   HENT: Negative.     Eyes: Negative.    Respiratory: Negative.     Cardiovascular: Negative.    Gastrointestinal:  Positive for abdominal pain and nausea.   Endocrine: Negative.    Genitourinary: Negative.    Musculoskeletal: Negative.    Skin: Negative.    Allergic/Immunologic: Negative.    Neurological: Negative.    Hematological: Negative.    Psychiatric/Behavioral: Negative.         Historical Information   Past Medical History:   Diagnosis Date    A-fib ([facility]) 03/2020    Allergic     Anxiety     Arthritis     Asthma     Back pain     and muscle pain    Bruises easily     Chronic pain disorder     Interstitial pain    Colon polyp     Dental bridge present     Depression     Eczema     GERD (gastroesophageal reflux disease)     Heart murmur     History of blood clots     per pt \"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\"    History of pneumonia     Hives     Hyperlipidemia     Hypertension     Infertility, female     Interstitial cystitis     Migraine     sees " "neurologist    Motion sickness     Obesity     Palpitations     PONV (postoperative nausea and vomiting)     Premature atrial contractions 11/09/2022    Psychiatric disorder     Sleep apnea     TIA (transient ischemic attack) 09/2022    per pt --\"had MRI and 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\"    Urinary incontinence     wears Pads    Venous insufficiency 08/01/2017    Wears glasses      Past Surgical History:   Procedure Laterality Date    COLONOSCOPY      DILATION AND CURETTAGE OF UTERUS      EGD      EXPLORATORY LAPAROTOMY      for infertility    EXPLORATORY LAPAROTOMY W/ BOWEL RESECTION N/A 7/19/2023    Procedure: LAPAROTOMY EXPLORATORY W/ BOWEL RESECTION;  Surgeon: Crista Recinos MD;  Location: [facility];  Service: General    HAND SURGERY      Hand excision of tendon cyst    GA LAPAROSCOPIC APPENDECTOMY N/A 05/03/2022    Procedure: APPENDECTOMY LAPAROSCOPIC;  Surgeon: Vin Fields MD;  Location: [facility];  Service: General    GA LAPS ABD PRTM&OMENTUM DX W/WO SPEC BR/WA SPX N/A 7/19/2023    Procedure: LAPAROSCOPY DIAGNOSTIC, LYSIS OF ADHESIONS, LAPAROSCOPY TAKE TAKEDOWN OF LEFT COLON, EXPLORATORY LAPAROSCOPY, LYSIS OF ADHESIONS, RESECTION OF TRANSVERSE COLON SPLENIC FLEXURE AND DESCENDING COLON , TAKE DOWN OF SPLENIC FLEXURE, SIGMOIDOSCOPY, MOBILIZATION OF RIGHT COLON, PRIMARY ANASTOMOSIS EEA 29, TAP BLOCK;  Surgeon: Crista Recinos MD;  [facility];  Service: General    GA RPR AA HERNIA RECR > 10 CM REDUCIBLE N/A 5/5/2025    Procedure: REPAIR HERNIA INCISIONAL 20 cm, retro rectus repair with Phasic mesh.  Unilateral myofascial release external oblique component seperation. TAP block;  Surgeon: Jewel Rankin MD;  [facility];  Service: General    GA TCAT IV STENT CRV CRTD ART EMBOLIC PROTECJ Left 4/29/2024    Procedure: ANGIOPLASTY ARTERY CAROTID W/ STENT;  Surgeon: Roberto García DO;  [facility];  Service: Vascular    GA TEAEC W/PATCH GRF CAROTID VERTB SUBCLAV NECK " INC Left 2023    Procedure: EXPLORATION OF LEFT CAROTID ARTERY; ABORTED ENDARTERECTOMY ARTERY CAROTID;  Surgeon: Roberto García DO;  Location[facility];  Service: Vascular    SUPERIOR MESTENTERIC ARTERY STENT      WISDOM TOOTH EXTRACTION       Social History     Tobacco Use    Smoking status: Former     Current packs/day: 0.00     Average packs/day: 0.5 packs/day for 10.0 years (5.0 ttl pk-yrs)     Types: Cigarettes     Start date:      Quit date: 2019     Years since quittin.3     Passive exposure: Past    Smokeless tobacco: Never   Vaping Use    Vaping status: Never Used   Substance and Sexual Activity    Alcohol use: Not Currently    Drug use: No    Sexual activity: Not Currently     Comment: defer     E-Cigarette/Vaping    E-Cigarette Use Never User      E-Cigarette/Vaping Substances    Nicotine No     THC No     CBD No     Flavoring No     Other No     Unknown No      Family history non-contributory  Social History:  Marital Status:      Meds/Allergies   I have reviewed home medications with patient personally.  Prior to Admission medications    Medication Sig Start Date End Date Taking? Authorizing Provider   acetaminophen (TYLENOL) 325 mg tablet Take 2 tablets (650 mg total) by mouth every 6 (six) hours 23  Yes Ani Rodas PA-C   Alcohol Swabs 70 % PADS May substitute brand based on insurance coverage. Check glucose BID. 25  Yes Mariel Ramon, DO   apixaban (Eliquis) 5 mg Take 1 tablet (5 mg total) by mouth 2 (two) times a day 24  Yes Jami Lynn,    Blood Glucose Monitoring Suppl (OneTouch Verio Reflect) w/Device KIT May substitute brand based on insurance coverage. Check glucose BID. 25  Yes Mariel Ramon, DO   chlordiazepoxide-clidinium (LIBRAX) 5-2.5 mg per capsule TAKE 1 CAPSULE BY MOUTH TWO TIMES DAILY AS NEEDED FOR INDIGESTION 24  Yes Coy Ayoub, DO   chlorhexidine (PERIDEX) 0.12 % solution Apply 15 mL to the mouth or throat 2 (two) times a  day  Patient taking differently: Apply 15 mL to the mouth or throat 2 (two) times a day As needed 12/5/24  Yes Coy Ayoub DO   dexamethasone (DECADRON) 2 mg tablet One tab with breakfast (early in day to minimize impact on sleep, with food for stomach protection) for 1-5 days for unrelenting migraine 5/1/25  Yes Stacy Raymond MD   Diclofenac Sodium (VOLTAREN) 1 % Apply 2 g topically 4 (four) times a day  Patient taking differently: Apply 2 g topically 4 (four) times a day As needed 2/26/25  Yes Cyo Ayoub DO   diltiazem (CARDIZEM CD) 240 mg 24 hr capsule Take 1 capsule (240 mg total) by mouth daily  Patient taking differently: Take 240 mg by mouth daily Takes in afternoon 3/28/25  Yes Coy Ayoub DO   Docusate Sodium (COLACE PO) Take by mouth daily at bedtime   Yes Historical Provider, MD   Evolocumab (Repatha SureClick) 140 MG/ML SOAJ Inject 1 mL (140 mg total) under the skin every 14 (fourteen) days 3/3/25  Yes Jami Lynn,    fexofenadine (ALLEGRA) 180 MG tablet Take 180 mg by mouth daily   Yes Historical Provider, MD   fluticasone-vilanterol (Breo Ellipta) 200-25 mcg/actuation inhaler Inhale 1 puff daily Rinse mouth after use. 3/28/25  Yes Coy Ayoub DO   furosemide (LASIX) 40 mg tablet Take 1 tablet (40 mg total) by mouth daily 11/12/24  Yes Coy Ayoub DO   glucose blood (OneTouch Verio) test strip May substitute brand based on insurance coverage. Check glucose BID. 5/6/25  Yes Mariel Ramon,    LORazepam (ATIVAN) 1 mg tablet Take 1 tablet (1 mg total) by mouth 3 (three) times a day 5/23/24  Yes Mckay Pratt MD   LORazepam (ATIVAN) 2 mg tablet Take 1 tablet (2 mg total) by mouth every 8 (eight) hours as needed for anxiety 2/28/25  Yes Coy Ayoub DO   lubiprostone (AMITIZA) 8 mcg capsule Take 1 capsule (8 mcg total) by mouth 2 (two) times a day with meals 4/7/25 7/6/25 Yes Angel Nicole DO   methocarbamol (ROBAXIN) 500 mg tablet TAKE TWO TABLETS BY MOUTH THREE TIMES A DAY  3/12/25  Yes Coy Ayoub DO   metoprolol succinate (TOPROL-XL) 100 mg 24 hr tablet Take 1 tablet (100 mg total) by mouth 2 (two) times a day 11/11/24  Yes Coy Ayoub DO   omeprazole (PriLOSEC) 40 MG capsule Take 1 capsule (40 mg total) by mouth daily 1/14/25 7/13/25 Yes Ramu Bernal MD   ondansetron (ZOFRAN-ODT) 4 mg disintegrating tablet Take 1 tablet (4 mg total) by mouth every 8 (eight) hours as needed for nausea 12/16/24  Yes Magy Oakley PA-C   OneTouch Delica Lancets 33G MISC May substitute brand based on insurance coverage. Check glucose BID. 5/6/25  Yes Mariel Ramon DO   phenazopyridine (PYRIDIUM) 200 mg tablet Take 1 tablet (200 mg total) by mouth 3 (three) times a day with meals 1/9/25  Yes JUD Blanc   phenazopyridine (PYRIDIUM) 200 mg tablet Take 1 tablet (200 mg total) by mouth 3 (three) times a day with meals  Patient taking differently: Take 200 mg by mouth 3 (three) times a day with meals prn 2/4/25  Yes Alexis Hobson MD   polyethylene glycol (MIRALAX) 17 g packet Take 17 g by mouth 2 (two) times a day 4/7/25 7/6/25 Yes Angel Nicole DO   promethazine (PHENERGAN) 25 mg tablet TAKE 1 TABLET BY MOUTH EVERY 6 HOURS AS NEEDED FOR NAUSEA AND/OR VOMITING 5/5/25  Yes Cecilia Laughlin PA-C   sitaGLIPtin (JANUVIA) 25 mg tablet Take 1 tablet (25 mg total) by mouth daily Do not start before May 7, 2025. 5/7/25  Yes Mariel Ramon DO   spironolactone (ALDACTONE) 25 mg tablet Take 1 tablet (25 mg total) by mouth 2 (two) times a day 11/5/24  Yes Coy Ayoub DO   sucralfate (CARAFATE) 1 g tablet Take 1 tablet (1 g total) by mouth 4 (four) times a day  Patient taking differently: Take 1 g by mouth 2 (two) times a day 3/25/25  Yes Ramu Bernal MD   traMADol (ULTRAM) 50 mg tablet Take 2 tablets (100 mg total) by mouth every 8 (eight) hours as needed for moderate pain 2/12/25  Yes Coy Ayoub,    triamcinolone (KENALOG) 0.1 % ointment Apply topically 2 (two) times a day START applying  triamcinolone 0.1% ointment to the affected area twice daily for up to 2 weeks for flares. Do not use for more than 2 weeks.  Patient taking differently: Apply topically 2 (two) times a day START applying triamcinolone 0.1% ointment to the affected area twice daily for up to 2 weeks for flares. Do not use for more than 2 weeks.  As needed 3/12/25  Yes Santy Saha MD   butalbital-acetaminophen-caffeine (FIORICET,ESGIC) -40 mg per tablet Take 1 tablet by mouth every 4 (four) hours as needed for headaches  Patient not taking: Reported on 4/23/2025 2/28/25   Coy Ayoub DO   ezetimibe (ZETIA) 10 mg tablet Take 1 tablet (10 mg total) by mouth daily  Patient not taking: Reported on 4/23/2025 1/20/25   Jami Lynn DO   famciclovir (FAMVIR) 500 mg tablet Take 1 tablet (500 mg total) by mouth 3 (three) times a day for 7 days  Patient not taking: Reported on 2/26/2025 11/21/24 1/29/25  Coy Ayoub DO   magnesium Oxide (MAG-OX) 400 mg TABS Take 1 tablet (400 mg total) by mouth daily at bedtime  Patient not taking: Reported on 5/5/2025 4/23/25   Stacy Raymond MD   Meth-Hyo-M Bl-Na Phos-Ph Sal (Uribel) 118 MG CAPS Take 1 capsule (118 mg total) by mouth 3 (three) times a day  Patient not taking: Reported on 5/5/2025 4/15/25   JUD Blanc   polyethylene glycol (Golytely) 4000 mL solution Take 4,000 mL by mouth once for 1 dose Take 4000 mL by mouth once for 1 dose. Use as directed 4/7/25 4/7/25  Angel G Corey DO   polyethylene glycol (MiraLax) 17 GM/SCOOP powder Take 238 g by mouth once for 1 dose Take 238 g my mouth. Use as directed 4/7/25 4/7/25  Angel G Corey, DO     Allergies   Allergen Reactions    Ace Inhibitors Angioedema and Anaphylaxis     Anaphylaxis    Benicar [Olmesartan] Angioedema     See Allergy note from 9/11/2008.  Swollen ankles/legs    Hydralazine Other (See Comments)     Lip swelling  Flank pain    Other Anaphylaxis and Other (See Comments)     Preservatives- itching throat  "closes, hi  E Z Cat scan contrast - hives throat closes itching,  Preservatives- itching throat closes, hi  Artificial sweeteners    Valsartan Angioedema     Lips, face swollen    Ampicillin Hives     Depends on brand some preservatives can react. Has recently tolerated oral amoxicillin.    Keflex [Cephalexin] Itching    Sulfa Antibiotics Hives     stuffiness,itching,hives,throat closing--per pt \"sometimes able to take depending on the brand and the filler or possibly preservatives in it\"    Motrin [Ibuprofen] Other (See Comments)     Per pt \" told not to take due to A Fib\"    Wound Dressing Adhesive Other (See Comments)     Per pt Adhesive on EKG leads caused redness       Objective :  Temp:  [97.7 °F (36.5 °C)-98.8 °F (37.1 °C)] 98 °F (36.7 °C)  HR:  [78-92] 78  BP: (107-159)/(52-75) 159/75  Resp:  [16-20] 18  SpO2:  [87 %-94 %] 90 %  O2 Device: None (Room air)  Nasal Cannula O2 Flow Rate (L/min):  [2 L/min-3 L/min] 2 L/min    Physical Exam  Constitutional:       Appearance: Normal appearance.   HENT:      Head: Normocephalic and atraumatic.   Eyes:      Extraocular Movements: Extraocular movements intact.      Pupils: Pupils are equal, round, and reactive to light.   Cardiovascular:      Rate and Rhythm: Normal rate and regular rhythm.      Heart sounds: No murmur heard.     No friction rub. No gallop.   Pulmonary:      Effort: Pulmonary effort is normal. No respiratory distress.      Breath sounds: Normal breath sounds. No wheezing, rhonchi or rales.   Abdominal:      General: Bowel sounds are normal. There is no distension.      Palpations: Abdomen is soft.      Tenderness: There is abdominal tenderness. There is no guarding or rebound.   Neurological:      Mental Status: She is alert and oriented to person, place, and time.       Lines/Drains:  Lines/Drains/Airways       Active Status       Name Placement date Placement time Site Days    Closed/Suction Drain Right RLQ Bulb 15 Fr. 05/05/25  1746  RLQ  less than " 1    Closed/Suction Drain Left LLQ Bulb 15 Fr. 05/05/25  1748  LLQ  less than 1                          Lab Results: I have reviewed the following results:  Results from last 7 days   Lab Units 05/06/25  0432   WBC Thousand/uL 12.56*   HEMOGLOBIN g/dL 10.1*   HEMATOCRIT % 30.1*   PLATELETS Thousands/uL 222   SEGS PCT % 87*   LYMPHO PCT % 6*   MONO PCT % 6   EOS PCT % 0     Results from last 7 days   Lab Units 05/06/25  0432   SODIUM mmol/L 138   POTASSIUM mmol/L 3.9   CHLORIDE mmol/L 104   CO2 mmol/L 27   BUN mg/dL 14   CREATININE mg/dL 0.89   ANION GAP mmol/L 7   CALCIUM mg/dL 8.6   GLUCOSE RANDOM mg/dL 288*         Results from last 7 days   Lab Units 05/06/25  1617 05/06/25  1417 05/06/25  1116 05/06/25  0721   POC GLUCOSE mg/dl 191* 265* 354* 258*     Lab Results   Component Value Date    HGBA1C 6.9 (H) 04/29/2025    HGBA1C 6.1 12/05/2024    HGBA1C 5.5 05/23/2023           Imaging Results Review: No pertinent imaging studies reviewed.  Other Study Results Review: No additional pertinent studies reviewed.

## 2025-05-06 NOTE — PLAN OF CARE
Problem: PAIN - ADULT  Goal: Verbalizes/displays adequate comfort level or baseline comfort level  Description: Interventions:- Encourage patient to monitor pain and request assistance- Assess pain using appropriate pain scale- Administer analgesics based on type and severity of pain and evaluate response- Implement non-pharmacological measures as appropriate and evaluate response- Consider cultural and social influences on pain and pain management- Notify physician/advanced practitioner if interventions unsuccessful or patient reports new pain  Outcome: Progressing     Problem: INFECTION - ADULT  Goal: Absence or prevention of progression during hospitalization  Description: INTERVENTIONS:- Assess and monitor for signs and symptoms of infection- Monitor lab/diagnostic results- Monitor all insertion sites, i.e. indwelling lines, tubes, and drains- Monitor endotracheal if appropriate and nasal secretions for changes in amount and color- [place] appropriate cooling/warming therapies per order- Administer medications as ordered- Instruct and encourage patient and family to use good hand hygiene technique- Identify and instruct in appropriate isolation precautions for identified infection/condition  Outcome: Progressing     Problem: SAFETY ADULT  Goal: Patient will remain free of falls  Description: INTERVENTIONS:- Educate patient/family on patient safety including physical limitations- Instruct patient to call for assistance with activity - Consult OT/PT to assist with strengthening/mobility - Keep Call bell within reach- Keep bed low and locked with side rails adjusted as appropriate- Keep care items and personal belongings within reach- Initiate and maintain comfort rounds- Make Fall Risk Sign visible to staff- Offer Toileting every 2 Hours, in advance of need- Initiate/Maintain bed alarm- Obtain necessary fall risk management equipment:  socks, yellow bracelet, call bell- Apply yellow socks and bracelet for  high fall risk patients- Consider moving patient to room near nurses station  Outcome: Progressing     Problem: DISCHARGE PLANNING  Goal: Discharge to home or other facility with appropriate resources  Description: INTERVENTIONS:- Identify barriers to discharge w/patient and caregiver- Arrange for needed discharge resources and transportation as appropriate- Identify discharge learning needs (meds, wound care, etc.)- Arrange for interpretive services to assist at discharge as needed- Refer to Case Management Department for coordinating discharge planning if the patient needs post-hospital services based on physician/advanced practitioner order or complex needs related to functional status, cognitive ability, or social support system  Outcome: Progressing     Problem: Knowledge Deficit  Goal: Patient/family/caregiver demonstrates understanding of disease process, treatment plan, medications, and discharge instructions  Description: Complete learning assessment and assess knowledge base.Interventions:- Provide teaching at level of understanding- Provide teaching via preferred learning methods  Outcome: Progressing     Problem: GASTROINTESTINAL - ADULT  Goal: Maintains or returns to baseline bowel function  Description: INTERVENTIONS:- Assess bowel function- Encourage oral fluids to ensure adequate hydration- Administer IV fluids if ordered to ensure adequate hydration- Administer ordered medications as needed- Encourage mobilization and activity- Consider nutritional services referral to assist patient with adequate nutrition and appropriate food choices  Outcome: Progressing     Problem: METABOLIC, FLUID AND ELECTROLYTES - ADULT  Goal: Electrolytes maintained within normal limits  Description: INTERVENTIONS:- Monitor labs and assess patient for signs and symptoms of electrolyte imbalances- Administer electrolyte replacement as ordered- Monitor response to electrolyte replacements, including repeat lab results as  appropriate- Instruct patient on fluid and nutrition as appropriate  Outcome: Progressing  Goal: Fluid balance maintained  Description: INTERVENTIONS:- Monitor labs - Monitor I/O and WT- Instruct patient on fluid and nutrition as appropriate- Assess for signs & symptoms of volume excess or deficit  Outcome: Progressing     Problem: SKIN/TISSUE INTEGRITY - ADULT  Goal: Incision(s), wounds(s) or drain site(s) healing without S/S of infection  Description: INTERVENTIONS- Assess and document dressing, incision, wound bed, drain sites and surrounding tissue- Provide patient and family education- Perform skin care/dressing changes  Outcome: Progressing

## 2025-05-06 NOTE — PROGRESS NOTES
Progress Note - Surgery-General   Name: Nancy Jones 65 y.o. female I MRN: [id number]  Unit/Bed#: E2 -01 I Date of Admission: 5/5/2025   Date of Service: 5/6/2025 I Hospital Day: 1    Assessment & Plan  Incisional hernia, without obstruction or gangrene  66yo F with hx of Afib on Eliquis, large incisional hernia s/p incisional hernia repair w/ retrorectus phasix mesh, unilateral external oblique release on 5/5.    Plan  Regular diet  Monitor JENNIFER drain output and character  SSI  D/c IV fluids  Prn analgesia and antiemetics  PT/OT  CM consult  Plan to restart Eliquis 5/6 PM if not discharged today        Subjective   NAEON. Pain controlled. Tolerating diet without nausea or vomiting. Passing flatus but no BM. Has not been OOB.     Objective :  Temp:  [96.8 °F (36 °C)-98.8 °F (37.1 °C)] 98.7 °F (37.1 °C)  HR:  [53-92] 83  BP: (107-153)/(51-67) 109/59  Resp:  [16-20] 18  SpO2:  [90 %-94 %] 90 %  O2 Device: None (Room air)  Nasal Cannula O2 Flow Rate (L/min):  [2 L/min-3 L/min] 2 L/min    I/O         05/04 0701  05/05 0700 05/05 0701  05/06 0700    I.V. (mL/kg)  2002.1 (20.6)    IV Piggyback  300    Total Intake(mL/kg)  2302.1 (23.7)    Urine (mL/kg/hr)  405    Drains  110    Blood  75    Total Output  590    Net  +1712.1                Lines/Drains/Airways       Active Status       Name Placement date Placement time Site Days    Closed/Suction Drain Right RLQ Bulb 15 Fr. 05/05/25  1746  RLQ  less than 1    Closed/Suction Drain Left LLQ Bulb 15 Fr. 05/05/25  1748  LLQ  less than 1                  Physical Exam  General: NAD  HENT: NCAT MMM  Neck: supple, no JVD  CV: nl rate  Lungs: nl wob. No resp distress  ABD: Soft, appropriately tender, nondistended. Incision CDI.   Extrem: No CCE  Neuro: AAOx3      Lab Results: I have reviewed the following results:  Recent Labs     05/05/25  2048                VTE Pharmacologic Prophylaxis: VTE covered by:  [Held by provider] apixaban, Oral  heparin (porcine),  Subcutaneous, 5,000 Units at 05/05/25 2200     VTE Mechanical Prophylaxis: sequential compression device

## 2025-05-07 LAB
ANION GAP SERPL CALCULATED.3IONS-SCNC: 5 MMOL/L (ref 4–13)
BASOPHILS # BLD AUTO: 0.06 THOUSANDS/ÂΜL (ref 0–0.1)
BASOPHILS NFR BLD AUTO: 1 % (ref 0–1)
BUN SERPL-MCNC: 22 MG/DL (ref 5–25)
CALCIUM SERPL-MCNC: 8.4 MG/DL (ref 8.4–10.2)
CHLORIDE SERPL-SCNC: 106 MMOL/L (ref 96–108)
CO2 SERPL-SCNC: 29 MMOL/L (ref 21–32)
CREAT SERPL-MCNC: 0.82 MG/DL (ref 0.6–1.3)
EOSINOPHIL # BLD AUTO: 0.21 THOUSAND/ÂΜL (ref 0–0.61)
EOSINOPHIL NFR BLD AUTO: 2 % (ref 0–6)
ERYTHROCYTE [DISTWIDTH] IN BLOOD BY AUTOMATED COUNT: 13.2 % (ref 11.6–15.1)
GFR SERPL CREATININE-BSD FRML MDRD: 74 ML/MIN/1.73SQ M
GLUCOSE SERPL-MCNC: 120 MG/DL (ref 65–140)
GLUCOSE SERPL-MCNC: 129 MG/DL (ref 65–140)
GLUCOSE SERPL-MCNC: 136 MG/DL (ref 65–140)
GLUCOSE SERPL-MCNC: 159 MG/DL (ref 65–140)
GLUCOSE SERPL-MCNC: 173 MG/DL (ref 65–140)
GLUCOSE SERPL-MCNC: 178 MG/DL (ref 65–140)
GLUCOSE SERPL-MCNC: 185 MG/DL (ref 65–140)
HCT VFR BLD AUTO: 29.9 % (ref 34.8–46.1)
HGB BLD-MCNC: 9.6 G/DL (ref 11.5–15.4)
IMM GRANULOCYTES # BLD AUTO: 0.15 THOUSAND/UL (ref 0–0.2)
IMM GRANULOCYTES NFR BLD AUTO: 1 % (ref 0–2)
LYMPHOCYTES # BLD AUTO: 1.61 THOUSANDS/ÂΜL (ref 0.6–4.47)
LYMPHOCYTES NFR BLD AUTO: 15 % (ref 14–44)
MCH RBC QN AUTO: 31.2 PG (ref 26.8–34.3)
MCHC RBC AUTO-ENTMCNC: 32.1 G/DL (ref 31.4–37.4)
MCV RBC AUTO: 97 FL (ref 82–98)
MONOCYTES # BLD AUTO: 0.95 THOUSAND/ÂΜL (ref 0.17–1.22)
MONOCYTES NFR BLD AUTO: 9 % (ref 4–12)
NEUTROPHILS # BLD AUTO: 7.51 THOUSANDS/ÂΜL (ref 1.85–7.62)
NEUTS SEG NFR BLD AUTO: 72 % (ref 43–75)
NRBC BLD AUTO-RTO: 0 /100 WBCS
PHOSPHATE SERPL-MCNC: 3 MG/DL (ref 2.3–4.1)
PLATELET # BLD AUTO: 232 THOUSANDS/UL (ref 149–390)
PMV BLD AUTO: 9.7 FL (ref 8.9–12.7)
POTASSIUM SERPL-SCNC: 4.2 MMOL/L (ref 3.5–5.3)
RBC # BLD AUTO: 3.08 MILLION/UL (ref 3.81–5.12)
SODIUM SERPL-SCNC: 140 MMOL/L (ref 135–147)
WBC # BLD AUTO: 10.49 THOUSAND/UL (ref 4.31–10.16)

## 2025-05-07 PROCEDURE — 80048 BASIC METABOLIC PNL TOTAL CA: CPT

## 2025-05-07 PROCEDURE — 82948 REAGENT STRIP/BLOOD GLUCOSE: CPT

## 2025-05-07 PROCEDURE — 84100 ASSAY OF PHOSPHORUS: CPT

## 2025-05-07 PROCEDURE — 85025 COMPLETE CBC W/AUTO DIFF WBC: CPT

## 2025-05-07 PROCEDURE — 99024 POSTOP FOLLOW-UP VISIT: CPT

## 2025-05-07 PROCEDURE — 99232 SBSQ HOSP IP/OBS MODERATE 35: CPT | Performed by: INTERNAL MEDICINE

## 2025-05-07 RX ORDER — LIDOCAINE 50 MG/G
1 PATCH TOPICAL DAILY
Status: DISCONTINUED | OUTPATIENT
Start: 2025-05-07 | End: 2025-05-08 | Stop reason: HOSPADM

## 2025-05-07 RX ORDER — LIDOCAINE HYDROCHLORIDE 20 MG/ML
1 JELLY TOPICAL ONCE
Status: COMPLETED | OUTPATIENT
Start: 2025-05-07 | End: 2025-05-07

## 2025-05-07 RX ADMIN — METOPROLOL SUCCINATE 100 MG: 100 TABLET, EXTENDED RELEASE ORAL at 21:29

## 2025-05-07 RX ADMIN — ACETAMINOPHEN 975 MG: 325 TABLET, FILM COATED ORAL at 05:45

## 2025-05-07 RX ADMIN — METOPROLOL SUCCINATE 100 MG: 100 TABLET, EXTENDED RELEASE ORAL at 08:42

## 2025-05-07 RX ADMIN — PHENAZOPYRIDINE 200 MG: 100 TABLET ORAL at 11:58

## 2025-05-07 RX ADMIN — OXYCODONE 5 MG: 5 TABLET ORAL at 11:16

## 2025-05-07 RX ADMIN — OXYCODONE HYDROCHLORIDE 10 MG: 10 TABLET ORAL at 05:45

## 2025-05-07 RX ADMIN — FLUTICASONE FUROATE AND VILANTEROL TRIFENATATE 1 PUFF: 200; 25 POWDER RESPIRATORY (INHALATION) at 08:46

## 2025-05-07 RX ADMIN — PHENAZOPYRIDINE 200 MG: 100 TABLET ORAL at 16:34

## 2025-05-07 RX ADMIN — LIDOCAINE HYDROCHLORIDE 1 APPLICATION: 20 JELLY TOPICAL at 07:30

## 2025-05-07 RX ADMIN — LORAZEPAM 2 MG: 1 TABLET ORAL at 21:29

## 2025-05-07 RX ADMIN — SUCRALFATE 1 G: 1 TABLET ORAL at 08:42

## 2025-05-07 RX ADMIN — LIDOCAINE 1 PATCH: 50 PATCH TOPICAL at 15:37

## 2025-05-07 RX ADMIN — INSULIN LISPRO 1 UNITS: 100 INJECTION, SOLUTION INTRAVENOUS; SUBCUTANEOUS at 09:36

## 2025-05-07 RX ADMIN — INSULIN LISPRO 1 UNITS: 100 INJECTION, SOLUTION INTRAVENOUS; SUBCUTANEOUS at 14:08

## 2025-05-07 RX ADMIN — APIXABAN 5 MG: 5 TABLET, FILM COATED ORAL at 21:29

## 2025-05-07 RX ADMIN — DILTIAZEM HYDROCHLORIDE 240 MG: 240 CAPSULE, EXTENDED RELEASE ORAL at 08:42

## 2025-05-07 RX ADMIN — METHOCARBAMOL 500 MG: 500 TABLET ORAL at 05:45

## 2025-05-07 RX ADMIN — LORAZEPAM 2 MG: 1 TABLET ORAL at 11:17

## 2025-05-07 RX ADMIN — SITAGLIPTIN 25 MG: 25 TABLET, FILM COATED ORAL at 08:41

## 2025-05-07 RX ADMIN — PHENAZOPYRIDINE 200 MG: 100 TABLET ORAL at 07:26

## 2025-05-07 RX ADMIN — OXYCODONE 5 MG: 5 TABLET ORAL at 16:40

## 2025-05-07 RX ADMIN — METHOCARBAMOL 500 MG: 500 TABLET ORAL at 11:58

## 2025-05-07 RX ADMIN — APIXABAN 5 MG: 5 TABLET, FILM COATED ORAL at 08:43

## 2025-05-07 RX ADMIN — SUCRALFATE 1 G: 1 TABLET ORAL at 18:35

## 2025-05-07 RX ADMIN — OXYCODONE 5 MG: 5 TABLET ORAL at 21:30

## 2025-05-07 RX ADMIN — PANTOPRAZOLE SODIUM 40 MG: 40 TABLET, DELAYED RELEASE ORAL at 05:45

## 2025-05-07 RX ADMIN — METHOCARBAMOL 500 MG: 500 TABLET ORAL at 18:35

## 2025-05-07 RX ADMIN — INSULIN LISPRO 1 UNITS: 100 INJECTION, SOLUTION INTRAVENOUS; SUBCUTANEOUS at 18:36

## 2025-05-07 RX ADMIN — ACETAMINOPHEN 975 MG: 325 TABLET, FILM COATED ORAL at 14:07

## 2025-05-07 RX ADMIN — ACETAMINOPHEN 975 MG: 325 TABLET, FILM COATED ORAL at 21:29

## 2025-05-07 NOTE — PLAN OF CARE
Problem: PAIN - ADULT  Goal: Verbalizes/displays adequate comfort level or baseline comfort level  Description: Interventions:- Encourage patient to monitor pain and request assistance- Assess pain using appropriate pain scale- Administer analgesics based on type and severity of pain and evaluate response- Implement non-pharmacological measures as appropriate and evaluate response- Consider cultural and social influences on pain and pain management- Notify physician/advanced practitioner if interventions unsuccessful or patient reports new pain  Outcome: Progressing     Problem: INFECTION - ADULT  Goal: Absence or prevention of progression during hospitalization  Description: INTERVENTIONS:- Assess and monitor for signs and symptoms of infection- Monitor lab/diagnostic results- Monitor all insertion sites, i.e. indwelling lines, tubes, and drains- Monitor endotracheal if appropriate and nasal secretions for changes in amount and color- [place] appropriate cooling/warming therapies per order- Administer medications as ordered- Instruct and encourage patient and family to use good hand hygiene technique- Identify and instruct in appropriate isolation precautions for identified infection/condition  Outcome: Progressing     Problem: SAFETY ADULT  Goal: Patient will remain free of falls  Description: INTERVENTIONS:- Educate patient/family on patient safety including physical limitations- Instruct patient to call for assistance with activity - Consult OT/PT to assist with strengthening/mobility - Keep Call bell within reach- Keep bed low and locked with side rails adjusted as appropriate- Keep care items and personal belongings within reach- Initiate and maintain comfort rounds- Make Fall Risk Sign visible to staff- Offer Toileting every 2 Hours, in advance of need- Initiate/Maintain bed alarm- Obtain necessary fall risk management equipment:  socks, yellow bracelet, call bell- Apply yellow socks and bracelet for  high fall risk patients- Consider moving patient to room near nurses station  Outcome: Progressing     Problem: DISCHARGE PLANNING  Goal: Discharge to home or other facility with appropriate resources  Description: INTERVENTIONS:- Identify barriers to discharge w/patient and caregiver- Arrange for needed discharge resources and transportation as appropriate- Identify discharge learning needs (meds, wound care, etc.)- Arrange for interpretive services to assist at discharge as needed- Refer to Case Management Department for coordinating discharge planning if the patient needs post-hospital services based on physician/advanced practitioner order or complex needs related to functional status, cognitive ability, or social support system  Outcome: Progressing     Problem: Knowledge Deficit  Goal: Patient/family/caregiver demonstrates understanding of disease process, treatment plan, medications, and discharge instructions  Description: Complete learning assessment and assess knowledge base.Interventions:- Provide teaching at level of understanding- Provide teaching via preferred learning methods  Outcome: Progressing     Problem: GASTROINTESTINAL - ADULT  Goal: Maintains or returns to baseline bowel function  Description: INTERVENTIONS:- Assess bowel function- Encourage oral fluids to ensure adequate hydration- Administer IV fluids if ordered to ensure adequate hydration- Administer ordered medications as needed- Encourage mobilization and activity- Consider nutritional services referral to assist patient with adequate nutrition and appropriate food choices  Outcome: Progressing     Problem: METABOLIC, FLUID AND ELECTROLYTES - ADULT  Goal: Electrolytes maintained within normal limits  Description: INTERVENTIONS:- Monitor labs and assess patient for signs and symptoms of electrolyte imbalances- Administer electrolyte replacement as ordered- Monitor response to electrolyte replacements, including repeat lab results as  appropriate- Instruct patient on fluid and nutrition as appropriate  Outcome: Progressing  Goal: Fluid balance maintained  Description: INTERVENTIONS:- Monitor labs - Monitor I/O and WT- Instruct patient on fluid and nutrition as appropriate- Assess for signs & symptoms of volume excess or deficit  Outcome: Progressing     Problem: SKIN/TISSUE INTEGRITY - ADULT  Goal: Incision(s), wounds(s) or drain site(s) healing without S/S of infection  Description: INTERVENTIONS- Assess and document dressing, incision, wound bed, drain sites and surrounding tissue- Provide patient and family education- Perform skin care/dressing changes  Outcome: Progressing

## 2025-05-07 NOTE — ASSESSMENT & PLAN NOTE
Lab Results   Component Value Date    HGBA1C 6.9 (H) 04/29/2025       Recent Labs     05/07/25  0629 05/07/25  0923 05/07/25  1101 05/07/25  1358   POCGLU 129 185* 136 159*       Blood Sugar Average: Last 72 hrs:  (P) 208.8    Pt has diabetes likely due to recent hernia repair and use of decadron  Ideally would like to start metformin but pt has a lot of gi upset  Will trial januvia  Could consider jardiance in future if januvia is not tolerated- she is currently tolerating januvia   Referral to nutritionist  She is working on stopping decadron and starting topomax for headaches  Also will require glucometer which was added to discharge paperwork   Repeat A1c in 3 to 4 months

## 2025-05-07 NOTE — PROGRESS NOTES
Patient:    MRN:  [id number]    Kristina Request ID:  [id number]    Level of care reserved:  Home [facility] Agency    Partner Reserved:  [facility], [place] [address] [phone]    Clinical needs requested:    Geography searched:  [phone]    Start of Service:    Request sent:  12:07pm EDT on 5/6/2025 by Marilee Tejada    Partner reserved:  12:46pm EDT on 5/6/2025 by Marilee Tejada    Choice list shared:  12:46pm EDT on 5/6/2025 by Marilee Tejada

## 2025-05-07 NOTE — PLAN OF CARE
Problem: PAIN - ADULT  Goal: Verbalizes/displays adequate comfort level or baseline comfort level  Description: Interventions:- Encourage patient to monitor pain and request assistance- Assess pain using appropriate pain scale- Administer analgesics based on type and severity of pain and evaluate response- Implement non-pharmacological measures as appropriate and evaluate response- Consider cultural and social influences on pain and pain management- Notify physician/advanced practitioner if interventions unsuccessful or patient reports new pain  Outcome: Progressing     Problem: INFECTION - ADULT  Goal: Absence or prevention of progression during hospitalization  Description: INTERVENTIONS:- Assess and monitor for signs and symptoms of infection- Monitor lab/diagnostic results- Monitor all insertion sites, i.e. indwelling lines, tubes, and drains- Monitor endotracheal if appropriate and nasal secretions for changes in amount and color- [place] appropriate cooling/warming therapies per order- Administer medications as ordered- Instruct and encourage patient and family to use good hand hygiene technique- Identify and instruct in appropriate isolation precautions for identified infection/condition  Outcome: Progressing     Problem: SAFETY ADULT  Goal: Patient will remain free of falls  Description: INTERVENTIONS:- Educate patient/family on patient safety including physical limitations- Instruct patient to call for assistance with activity - Consult OT/PT to assist with strengthening/mobility - Keep Call bell within reach- Keep bed low and locked with side rails adjusted as appropriate- Keep care items and personal belongings within reach- Initiate and maintain comfort rounds- Make Fall Risk Sign visible to staff- Offer Toileting every 2 Hours, in advance of need- Initiate/Maintain bed alarm- Obtain necessary fall risk management equipment:  socks, yellow bracelet, call bell- Apply yellow socks and bracelet for  high fall risk patients- Consider moving patient to room near nurses station  Outcome: Progressing     Problem: DISCHARGE PLANNING  Goal: Discharge to home or other facility with appropriate resources  Description: INTERVENTIONS:- Identify barriers to discharge w/patient and caregiver- Arrange for needed discharge resources and transportation as appropriate- Identify discharge learning needs (meds, wound care, etc.)- Arrange for interpretive services to assist at discharge as needed- Refer to Case Management Department for coordinating discharge planning if the patient needs post-hospital services based on physician/advanced practitioner order or complex needs related to functional status, cognitive ability, or social support system  Outcome: Progressing     Problem: Knowledge Deficit  Goal: Patient/family/caregiver demonstrates understanding of disease process, treatment plan, medications, and discharge instructions  Description: Complete learning assessment and assess knowledge base.Interventions:- Provide teaching at level of understanding- Provide teaching via preferred learning methods  Outcome: Progressing     Problem: GASTROINTESTINAL - ADULT  Goal: Maintains or returns to baseline bowel function  Description: INTERVENTIONS:- Assess bowel function- Encourage oral fluids to ensure adequate hydration- Administer IV fluids if ordered to ensure adequate hydration- Administer ordered medications as needed- Encourage mobilization and activity- Consider nutritional services referral to assist patient with adequate nutrition and appropriate food choices  Outcome: Progressing     Problem: METABOLIC, FLUID AND ELECTROLYTES - ADULT  Goal: Electrolytes maintained within normal limits  Description: INTERVENTIONS:- Monitor labs and assess patient for signs and symptoms of electrolyte imbalances- Administer electrolyte replacement as ordered- Monitor response to electrolyte replacements, including repeat lab results as  appropriate- Instruct patient on fluid and nutrition as appropriate  Outcome: Progressing  Goal: Fluid balance maintained  Description: INTERVENTIONS:- Monitor labs - Monitor I/O and WT- Instruct patient on fluid and nutrition as appropriate- Assess for signs & symptoms of volume excess or deficit  Outcome: Progressing     Problem: SKIN/TISSUE INTEGRITY - ADULT  Goal: Incision(s), wounds(s) or drain site(s) healing without S/S of infection  Description: INTERVENTIONS- Assess and document dressing, incision, wound bed, drain sites and surrounding tissue- Provide patient and family education- Perform skin care/dressing changes  Outcome: Progressing

## 2025-05-07 NOTE — ASSESSMENT & PLAN NOTE
Lab Results   Component Value Date    EGFR 74 05/07/2025    EGFR 67 05/06/2025    EGFR 62 04/29/2025    CREATININE 0.82 05/07/2025    CREATININE 0.89 05/06/2025    CREATININE 0.94 04/29/2025   Creatinine stable at baseline

## 2025-05-07 NOTE — PROGRESS NOTES
Progress Note - Hospitalist   Name: Nancy Jones 65 y.o. female I MRN: [id number]  Unit/Bed#: E2 -01 I Date of Admission: 5/5/2025   Date of Service: 5/7/2025 I Hospital Day: 2    Assessment & Plan  Essential hypertension  Bp stable  Continue metoprolol and cardizem   Migraines  She states she has stopped Fioricet  Migraines have been less often but she uses decadron prn. Decadron use has been approx 3 to 4 times this year. Likely contributing to diabetes  Pt will consider d/c of decadron use and switch to topamax   COPD (chronic obstructive pulmonary disease) ([facility])  Currently without exacerbation   Continue fluticasone- vilanterol  Incisional hernia, without obstruction or gangrene  S/p incision hernia repair with retrorectus phasix mesh on 5/5  Per primary team   Stage 3 chronic kidney disease, unspecified whether stage 3a or 3b CKD ([facility])  Lab Results   Component Value Date    EGFR 74 05/07/2025    EGFR 67 05/06/2025    EGFR 62 04/29/2025    CREATININE 0.82 05/07/2025    CREATININE 0.89 05/06/2025    CREATININE 0.94 04/29/2025   Creatinine stable at baseline   Diabetes due to undrl condition w oth diabetic neuro comp ([facility])  Lab Results   Component Value Date    HGBA1C 6.9 (H) 04/29/2025       Recent Labs     05/07/25  0629 05/07/25  0923 05/07/25  1101 05/07/25  1358   POCGLU 129 185* 136 159*       Blood Sugar Average: Last 72 hrs:  (P) 208.8    Pt has diabetes likely due to recent hernia repair and use of decadron  Ideally would like to start metformin but pt has a lot of gi upset  Will trial januvia  Could consider jardiance in future if januvia is not tolerated- she is currently tolerating januvia   Referral to nutritionist  She is working on stopping decadron and starting topomax for headaches  Also will require glucometer which was added to discharge paperwork   Repeat A1c in 3 to 4 months    Code Status: Level 1 - Full Code    Subjective   Pt seen and examined. Pt was seen around 8:55 am. Pt states  she is less nauseated today. Her pain is improved today.     Objective :  Temp:  [96.2 °F (35.7 °C)-97.9 °F (36.6 °C)] 96.2 °F (35.7 °C)  HR:  [64-80] 64  BP: (121-196)/() 121/69  Resp:  [18] 18  SpO2:  [90 %] 90 %  O2 Device: None (Room air)    Body mass index is 36.73 kg/m².     Input and Output Summary (last 24 hours):     Intake/Output Summary (Last 24 hours) at 5/7/2025 1510  Last data filed at 5/7/2025 1228  Gross per 24 hour   Intake 718 ml   Output 1685 ml   Net -967 ml       Physical Exam  Constitutional:       Appearance: Normal appearance.   HENT:      Head: Normocephalic and atraumatic.   Eyes:      Extraocular Movements: Extraocular movements intact.      Pupils: Pupils are equal, round, and reactive to light.   Cardiovascular:      Rate and Rhythm: Normal rate and regular rhythm.      Heart sounds: No murmur heard.     No friction rub. No gallop.   Pulmonary:      Effort: Pulmonary effort is normal. No respiratory distress.      Breath sounds: Normal breath sounds. No wheezing, rhonchi or rales.   Abdominal:      General: Bowel sounds are normal. There is no distension.      Palpations: Abdomen is soft.      Tenderness: There is abdominal tenderness. There is no guarding.   Neurological:      Mental Status: She is alert and oriented to person, place, and time.           Lines/Drains:  Lines/Drains/Airways       Active Status       Name Placement date Placement time Site Days    Closed/Suction Drain Right RLQ Bulb 15 Fr. 05/05/25  1746  RLQ  1    Closed/Suction Drain Left LLQ Bulb 15 Fr. 05/05/25  1748  LLQ  1                      Lab Results: I have reviewed the following results:   Results from last 7 days   Lab Units 05/07/25  0426   WBC Thousand/uL 10.49*   HEMOGLOBIN g/dL 9.6*   HEMATOCRIT % 29.9*   PLATELETS Thousands/uL 232   SEGS PCT % 72   LYMPHO PCT % 15   MONO PCT % 9   EOS PCT % 2     Results from last 7 days   Lab Units 05/07/25  0426   SODIUM mmol/L 140   POTASSIUM mmol/L 4.2    CHLORIDE mmol/L 106   CO2 mmol/L 29   BUN mg/dL 22   CREATININE mg/dL 0.82   ANION GAP mmol/L 5   CALCIUM mg/dL 8.4   GLUCOSE RANDOM mg/dL 178*         Results from last 7 days   Lab Units 05/07/25  1358 05/07/25  1101 05/07/25  0923 05/07/25  0629 05/06/25  2103 05/06/25  1816 05/06/25  1617 05/06/25  1417 05/06/25  1116 05/06/25  0721   POC GLUCOSE mg/dl 159* 136 185* 129 219* 192* 191* 265* 354* 258*               Recent Cultures (last 7 days):         Imaging Results Review: No pertinent imaging studies reviewed.  Other Study Results Review: No additional pertinent studies reviewed.    Last 24 Hours Medication List:     Current Facility-Administered Medications:     acetaminophen (TYLENOL) tablet 975 mg, Q8H BONIFACIO    apixaban (ELIQUIS) tablet 5 mg, BID    diltiazem (CARDIZEM CD) 24 hr capsule 240 mg, Daily    fluticasone-vilanterol 200-25 mcg/actuation 1 puff, Daily    gabapentin (NEURONTIN) capsule 100 mg, TID    HYDROmorphone (DILAUDID) injection 0.5 mg, Q3H PRN    insulin lispro (HumALOG/ADMELOG) 100 units/mL subcutaneous injection 1-6 Units, 4 times day **AND** Fingerstick Glucose (POCT), 4 times day    labetalol (NORMODYNE) injection 10 mg, Q4H PRN    LORazepam (ATIVAN) tablet 2 mg, Q8H PRN    methocarbamol (ROBAXIN) tablet 500 mg, Q6H BONIFACIO    metoprolol succinate (TOPROL-XL) 24 hr tablet 100 mg, BID    oxyCODONE (ROXICODONE) immediate release tablet 10 mg, Q4H PRN    oxyCODONE (ROXICODONE) IR tablet 5 mg, Q4H PRN    pantoprazole (PROTONIX) EC tablet 40 mg, Early Morning    phenazopyridine (PYRIDIUM) tablet 200 mg, TID With Meals    sitaGLIPtin (JANUVIA) tablet 25 mg, Daily    sucralfate (CARAFATE) tablet 1 g, BID    Administrative Statements   Today, Patient Was Seen By: Mariel Ramon DO

## 2025-05-08 ENCOUNTER — TELEPHONE (OUTPATIENT)
Age: 65
End: 2025-05-08

## 2025-05-08 VITALS
DIASTOLIC BLOOD PRESSURE: 57 MMHG | WEIGHT: 214 LBS | HEART RATE: 67 BPM | RESPIRATION RATE: 18 BRPM | TEMPERATURE: 98.1 F | HEIGHT: 64 IN | BODY MASS INDEX: 36.54 KG/M2 | SYSTOLIC BLOOD PRESSURE: 132 MMHG | OXYGEN SATURATION: 87 %

## 2025-05-08 DIAGNOSIS — E08.49 DIABETES DUE TO UNDRL CONDITION W OTH DIABETIC NEURO COMP (HCC): Primary | ICD-10-CM

## 2025-05-08 PROBLEM — K43.9 VENTRAL HERNIA WITHOUT OBSTRUCTION OR GANGRENE: Status: RESOLVED | Noted: 2024-01-19 | Resolved: 2025-05-08

## 2025-05-08 PROBLEM — K43.2 INCISIONAL HERNIA, WITHOUT OBSTRUCTION OR GANGRENE: Status: RESOLVED | Noted: 2023-11-30 | Resolved: 2025-05-08

## 2025-05-08 LAB
DME PARACHUTE DELIVERY DATE REQUESTED: NORMAL
DME PARACHUTE ITEM DESCRIPTION: NORMAL
DME PARACHUTE ORDER STATUS: NORMAL
DME PARACHUTE SUPPLIER NAME: NORMAL
DME PARACHUTE SUPPLIER PHONE: NORMAL
GLUCOSE SERPL-MCNC: 129 MG/DL (ref 65–140)
GLUCOSE SERPL-MCNC: 131 MG/DL (ref 65–140)
GLUCOSE SERPL-MCNC: 131 MG/DL (ref 65–140)
GLUCOSE SERPL-MCNC: 134 MG/DL (ref 65–140)

## 2025-05-08 PROCEDURE — 82948 REAGENT STRIP/BLOOD GLUCOSE: CPT

## 2025-05-08 PROCEDURE — NC001 PR NO CHARGE: Performed by: SPECIALIST

## 2025-05-08 RX ORDER — OXYCODONE AND ACETAMINOPHEN 5; 325 MG/1; MG/1
1 TABLET ORAL EVERY 4 HOURS PRN
Qty: 6 TABLET | Refills: 0 | Status: SHIPPED | OUTPATIENT
Start: 2025-05-08 | End: 2025-05-09 | Stop reason: SDUPTHER

## 2025-05-08 RX ORDER — OXYCODONE HYDROCHLORIDE 5 MG/1
5 TABLET ORAL
Refills: 0 | Status: DISCONTINUED | OUTPATIENT
Start: 2025-05-08 | End: 2025-05-08 | Stop reason: HOSPADM

## 2025-05-08 RX ADMIN — METHOCARBAMOL 500 MG: 500 TABLET ORAL at 06:05

## 2025-05-08 RX ADMIN — SUCRALFATE 1 G: 1 TABLET ORAL at 09:22

## 2025-05-08 RX ADMIN — OXYCODONE 5 MG: 5 TABLET ORAL at 03:25

## 2025-05-08 RX ADMIN — OXYCODONE 5 MG: 5 TABLET ORAL at 07:49

## 2025-05-08 RX ADMIN — PANTOPRAZOLE SODIUM 40 MG: 40 TABLET, DELAYED RELEASE ORAL at 06:05

## 2025-05-08 RX ADMIN — LORAZEPAM 2 MG: 1 TABLET ORAL at 07:49

## 2025-05-08 RX ADMIN — METHOCARBAMOL 500 MG: 500 TABLET ORAL at 12:14

## 2025-05-08 RX ADMIN — PHENAZOPYRIDINE 200 MG: 100 TABLET ORAL at 07:49

## 2025-05-08 RX ADMIN — OXYCODONE 5 MG: 5 TABLET ORAL at 14:59

## 2025-05-08 RX ADMIN — ACETAMINOPHEN 975 MG: 325 TABLET, FILM COATED ORAL at 06:05

## 2025-05-08 RX ADMIN — LIDOCAINE 1 PATCH: 50 PATCH TOPICAL at 09:28

## 2025-05-08 RX ADMIN — METHOCARBAMOL 500 MG: 500 TABLET ORAL at 17:49

## 2025-05-08 RX ADMIN — ACETAMINOPHEN 975 MG: 325 TABLET, FILM COATED ORAL at 14:58

## 2025-05-08 RX ADMIN — METOPROLOL SUCCINATE 100 MG: 100 TABLET, EXTENDED RELEASE ORAL at 09:25

## 2025-05-08 RX ADMIN — FLUTICASONE FUROATE AND VILANTEROL TRIFENATATE 1 PUFF: 200; 25 POWDER RESPIRATORY (INHALATION) at 09:31

## 2025-05-08 RX ADMIN — SUCRALFATE 1 G: 1 TABLET ORAL at 17:48

## 2025-05-08 RX ADMIN — DILTIAZEM HYDROCHLORIDE 240 MG: 240 CAPSULE, EXTENDED RELEASE ORAL at 09:22

## 2025-05-08 RX ADMIN — SITAGLIPTIN 25 MG: 25 TABLET, FILM COATED ORAL at 09:25

## 2025-05-08 RX ADMIN — PHENAZOPYRIDINE 200 MG: 100 TABLET ORAL at 12:14

## 2025-05-08 RX ADMIN — OXYCODONE 5 MG: 5 TABLET ORAL at 12:16

## 2025-05-08 RX ADMIN — APIXABAN 5 MG: 5 TABLET, FILM COATED ORAL at 09:23

## 2025-05-08 NOTE — DISCHARGE INSTR - AVS FIRST PAGE
General Surgery Discharge Instructions    Please follow-up as instructed.    Activity:  - No lifting greater than 20 pounds or strenuous physical activity or exercise for at least 2 weeks.  - Walking and normal light activities are encouraged.  - Normal daily activities including climbing steps are okay.  - No driving until no longer using pain medications.    Diet:    - You may resume your normal diet.    Wound Care:  -Midline wound is well-healed, covered with a gauze dressing, this can be replaced as needed.  Patient may clean the midline incision with soap and water.  Patient may shower, do not submerge incision in water including pools, bathtub, hot tubs.   - Surgical drains on the right and left side of the abdominal wall should be maintained, measure output and color of output daily.  VNA has been arranged to assist with drain care.    Medications:    - You should continue your current medication regimen after discharge unless otherwise instructed. Please refer to your discharge medication list for further details.  - Please take the pain medications as directed.  - You may become constipated, especially if taking pain medications. You may take any over the counter stool softeners or laxatives as needed. Examples: Milk of Magnesia, Colace, Senna.    Additional Instructions:  - If you have any questions or concerns after discharge please call the office.  - Call office or return to ER if fever greater than 101, chills, persistent nausea/vomiting, worsening/uncontrollable pain, and/or increasing redness or purulent/foul smelling drainage from incision(s).   -Please call and schedule a 2-week follow-up with the general surgery office, Dr. Rankin.   -Please follow-up with your primary care physician in 2 weeks to discuss glucose management and changes to your medication regimen  -Hospital Medicine Glucose Control plan            -Follow up with pcp outpt for repeat A1c in 2 to 3 months. May be able to d/c  januvia in the near future.

## 2025-05-08 NOTE — QUICK NOTE
Reviewed chart. Continue januvia. Follow up with pcp outpt for repeat A1c in 2 to 3 months. May be able to d/c januvia in the near future. Will sign off. Please call if any changes.

## 2025-05-08 NOTE — PLAN OF CARE
Problem: PAIN - ADULT  Goal: Verbalizes/displays adequate comfort level or baseline comfort level  Description: Interventions:- Encourage patient to monitor pain and request assistance- Assess pain using appropriate pain scale- Administer analgesics based on type and severity of pain and evaluate response- Implement non-pharmacological measures as appropriate and evaluate response- Consider cultural and social influences on pain and pain management- Notify physician/advanced practitioner if interventions unsuccessful or patient reports new pain  Outcome: Progressing     Problem: INFECTION - ADULT  Goal: Absence or prevention of progression during hospitalization  Description: INTERVENTIONS:- Assess and monitor for signs and symptoms of infection- Monitor lab/diagnostic results- Monitor all insertion sites, i.e. indwelling lines, tubes, and drains- Monitor endotracheal if appropriate and nasal secretions for changes in amount and color- [place] appropriate cooling/warming therapies per order- Administer medications as ordered- Instruct and encourage patient and family to use good hand hygiene technique- Identify and instruct in appropriate isolation precautions for identified infection/condition  Outcome: Progressing     Problem: SAFETY ADULT  Goal: Patient will remain free of falls  Description: INTERVENTIONS:- Educate patient/family on patient safety including physical limitations- Instruct patient to call for assistance with activity - Consult OT/PT to assist with strengthening/mobility - Keep Call bell within reach- Keep bed low and locked with side rails adjusted as appropriate- Keep care items and personal belongings within reach- Initiate and maintain comfort rounds- Make Fall Risk Sign visible to staff- Offer Toileting every 2 Hours, in advance of need- Initiate/Maintain bed alarm- Obtain necessary fall risk management equipment:  socks, yellow bracelet, call bell- Apply yellow socks and bracelet for  high fall risk patients- Consider moving patient to room near nurses station  Outcome: Progressing     Problem: DISCHARGE PLANNING  Goal: Discharge to home or other facility with appropriate resources  Description: INTERVENTIONS:- Identify barriers to discharge w/patient and caregiver- Arrange for needed discharge resources and transportation as appropriate- Identify discharge learning needs (meds, wound care, etc.)- Arrange for interpretive services to assist at discharge as needed- Refer to Case Management Department for coordinating discharge planning if the patient needs post-hospital services based on physician/advanced practitioner order or complex needs related to functional status, cognitive ability, or social support system  Outcome: Progressing     Problem: Knowledge Deficit  Goal: Patient/family/caregiver demonstrates understanding of disease process, treatment plan, medications, and discharge instructions  Description: Complete learning assessment and assess knowledge base.Interventions:- Provide teaching at level of understanding- Provide teaching via preferred learning methods  Outcome: Progressing     Problem: GASTROINTESTINAL - ADULT  Goal: Maintains or returns to baseline bowel function  Description: INTERVENTIONS:- Assess bowel function- Encourage oral fluids to ensure adequate hydration- Administer IV fluids if ordered to ensure adequate hydration- Administer ordered medications as needed- Encourage mobilization and activity- Consider nutritional services referral to assist patient with adequate nutrition and appropriate food choices  Outcome: Progressing     Problem: METABOLIC, FLUID AND ELECTROLYTES - ADULT  Goal: Electrolytes maintained within normal limits  Description: INTERVENTIONS:- Monitor labs and assess patient for signs and symptoms of electrolyte imbalances- Administer electrolyte replacement as ordered- Monitor response to electrolyte replacements, including repeat lab results as  appropriate- Instruct patient on fluid and nutrition as appropriate  Outcome: Progressing  Goal: Fluid balance maintained  Description: INTERVENTIONS:- Monitor labs - Monitor I/O and WT- Instruct patient on fluid and nutrition as appropriate- Assess for signs & symptoms of volume excess or deficit  Outcome: Progressing     Problem: SKIN/TISSUE INTEGRITY - ADULT  Goal: Incision(s), wounds(s) or drain site(s) healing without S/S of infection  Description: INTERVENTIONS- Assess and document dressing, incision, wound bed, drain sites and surrounding tissue- Provide patient and family education- Perform skin care/dressing changes  Outcome: Progressing

## 2025-05-08 NOTE — TELEPHONE ENCOUNTER
Patient has been referred to diabetes education from the hospital. Unfortunately, our department requires the referral to come from the department/provider who is actively treating the patient for their diabetes. If you believe it would be beneficial for your patient to schedule an appointment with diabetes education please place a new referral for the patient and we would be happy to schedule them.     Thank you!

## 2025-05-08 NOTE — PLAN OF CARE
Problem: PAIN - ADULT  Goal: Verbalizes/displays adequate comfort level or baseline comfort level  Description: Interventions:- Encourage patient to monitor pain and request assistance- Assess pain using appropriate pain scale- Administer analgesics based on type and severity of pain and evaluate response- Implement non-pharmacological measures as appropriate and evaluate response- Consider cultural and social influences on pain and pain management- Notify physician/advanced practitioner if interventions unsuccessful or patient reports new pain  Outcome: Progressing     Problem: INFECTION - ADULT  Goal: Absence or prevention of progression during hospitalization  Description: INTERVENTIONS:- Assess and monitor for signs and symptoms of infection- Monitor lab/diagnostic results- Monitor all insertion sites, i.e. indwelling lines, tubes, and drains- Monitor endotracheal if appropriate and nasal secretions for changes in amount and color- [place] appropriate cooling/warming therapies per order- Administer medications as ordered- Instruct and encourage patient and family to use good hand hygiene technique- Identify and instruct in appropriate isolation precautions for identified infection/condition  Outcome: Progressing     Problem: SAFETY ADULT  Goal: Patient will remain free of falls  Description: INTERVENTIONS:- Educate patient/family on patient safety including physical limitations- Instruct patient to call for assistance with activity - Consult OT/PT to assist with strengthening/mobility - Keep Call bell within reach- Keep bed low and locked with side rails adjusted as appropriate- Keep care items and personal belongings within reach- Initiate and maintain comfort rounds- Make Fall Risk Sign visible to staff- Offer Toileting every 2 Hours, in advance of need- Initiate/Maintain bed alarm- Obtain necessary fall risk management equipment:  socks, yellow bracelet, call bell- Apply yellow socks and bracelet for  high fall risk patients- Consider moving patient to room near nurses station  Outcome: Progressing     Problem: DISCHARGE PLANNING  Goal: Discharge to home or other facility with appropriate resources  Description: INTERVENTIONS:- Identify barriers to discharge w/patient and caregiver- Arrange for needed discharge resources and transportation as appropriate- Identify discharge learning needs (meds, wound care, etc.)- Arrange for interpretive services to assist at discharge as needed- Refer to Case Management Department for coordinating discharge planning if the patient needs post-hospital services based on physician/advanced practitioner order or complex needs related to functional status, cognitive ability, or social support system  Outcome: Progressing     Problem: Knowledge Deficit  Goal: Patient/family/caregiver demonstrates understanding of disease process, treatment plan, medications, and discharge instructions  Description: Complete learning assessment and assess knowledge base.Interventions:- Provide teaching at level of understanding- Provide teaching via preferred learning methods  Outcome: Progressing     Problem: GASTROINTESTINAL - ADULT  Goal: Maintains or returns to baseline bowel function  Description: INTERVENTIONS:- Assess bowel function- Encourage oral fluids to ensure adequate hydration- Administer IV fluids if ordered to ensure adequate hydration- Administer ordered medications as needed- Encourage mobilization and activity- Consider nutritional services referral to assist patient with adequate nutrition and appropriate food choices  Outcome: Progressing     Problem: METABOLIC, FLUID AND ELECTROLYTES - ADULT  Goal: Electrolytes maintained within normal limits  Description: INTERVENTIONS:- Monitor labs and assess patient for signs and symptoms of electrolyte imbalances- Administer electrolyte replacement as ordered- Monitor response to electrolyte replacements, including repeat lab results as  appropriate- Instruct patient on fluid and nutrition as appropriate  Outcome: Progressing  Goal: Fluid balance maintained  Description: INTERVENTIONS:- Monitor labs - Monitor I/O and WT- Instruct patient on fluid and nutrition as appropriate- Assess for signs & symptoms of volume excess or deficit  Outcome: Progressing     Problem: SKIN/TISSUE INTEGRITY - ADULT  Goal: Incision(s), wounds(s) or drain site(s) healing without S/S of infection  Description: INTERVENTIONS- Assess and document dressing, incision, wound bed, drain sites and surrounding tissue- Provide patient and family education- Perform skin care/dressing changes  Outcome: Progressing

## 2025-05-08 NOTE — ASSESSMENT & PLAN NOTE
65-year-old female s/p open ventral hernia repair with retrorectus phasic mesh, and unilateral external oblique release on 5/5. SLIM recommending trial of Januvia for elevated glucose and referral to nutritionist.    Right JENNIFER drain: 15 cc serosanguineous  Left JENNIFER drain: 140cc serosanguineous    Plan  -F/u CM VNA  -Regular diet  -Monitor JENNIFER drain output and character  -SSI  -Prn analgesia and antiemetics  -PT/OT  -Restarted on Eliquis   -Wean O2  -D/C within the next 24-48 hours

## 2025-05-08 NOTE — CASE MANAGEMENT
Case Management Discharge Planning Note    Patient name Nancy Jones  Location [facility] 2 /E2 -* MRN [id number]  : 1960 Date 2025       Current Admission Date: 2025  Current Admission Diagnosis:Essential hypertension   Patient Active Problem List    Diagnosis Date Noted Date Diagnosed    Pharyngitis due to Streptococcus species 2025     Rib contusion, right, subsequent encounter 2025     Financial difficulties 2024     Shingles 2024     Diabetes due to undrl condition w oth diabetic neuro comp ([facility]) 2024     Folliculitis 2024     Complex tear of lateral meniscus of left knee as current injury, initial encounter 2024     Opioid dependence, uncomplicated ([facility]) 2024     Obesity, morbid ([facility]) 2024     Acute lateral meniscus tear of left knee 2024     Acute pain of left knee 2024     Left facial numbness 2024     Other chest pain 2024     Atherosclerosis of native arteries of extremities with rest pain, bilateral legs ([facility]) 2024     Acute on chronic diastolic (congestive) heart failure ([facility]) 2024     Stage 3 chronic kidney disease, unspecified whether stage 3a or 3b CKD ([facility]) 2023     Dysuria 2023     Omental infarction ([facility]) 2023     Hypokalemia 2023     Night sweats 2023     S/P small bowel resection 2023     Anxiety 2023     Acute postoperative pain 2023     Colitis 2023     Chronic migraine w/o aura w/o status migrainosus, not intractable 2023     IIH (idiopathic intracranial hypertension) 2023     Hematochezia 2023     Left carotid stenosis 2023     Colonic ischemia ([facility]) 2023     Paresthesia 2023     Injury of left facial nerve 2023     Partial facial nerve palsy 2023     Hepatic steatosis 2022     Asymptomatic stenosis of left carotid artery 10/05/2022     Stenosis of left carotid artery  09/06/2022     Carotid artery stenosis 09/02/2022     Appendix disease 04/29/2022     Urinary retention 03/17/2022     Peripheral vascular disease ([facility]) 03/17/2022     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     COPD (chronic obstructive pulmonary disease) ([facility]) 08/19/2021     Unspecified diastolic (congestive) heart failure ([facility]) 03/12/2021     Hypertensive heart disease with congestive heart failure ([facility]) 12/21/2020     [place]ILLE (obstructive sleep apnea)      Lumbar radiculopathy 10/01/2020     Wheezing 09/21/2020     A-fib ([facility]) 03/19/2020     Angio-edema 01/22/2020     Gastroesophageal reflux disease without esophagitis 08/12/2019     Allergic reaction 12/03/2018     AMD (age-related macular degeneration), bilateral 11/16/2018     Angina pectoris ([facility]) 11/15/2017     Migraines 10/14/2016     Essential hypertension 08/13/2015       LOS (days): 3  Geometric Mean LOS (GMLOS) (days): 2  Days to GMLOS:-1     OBJECTIVE:  Risk of Unplanned Readmission Score: 26.58         Current admission status: Inpatient   Preferred Pharmacy:   [facility] [id number] [address] [place] [address]  Phone: [phone] Fax: [phone]    [facility] [id number] [place], [place] - [address]  [address]  [facility] [address]  Phone: [phone] Fax: [phone]    Primary Care Provider: Barber Ayoub DO    Primary Insurance: [facility] REP  Secondary Insurance:     DISCHARGE DETAILS:                                     DME Referral Provided  Referral made for DME?: Yes  DME referral completed for the following items:: Landon  DME Supplier Name:: [employer]                                Update 1604: Delivered to room by CM. Copay of $6.94 reviewed. Pt agreeable

## 2025-05-08 NOTE — PROGRESS NOTES
Progress Note - Surgery-General   Name: Nancy Jones 65 y.o. female I MRN: [id number]  Unit/Bed#: E2 -01 I Date of Admission: 5/5/2025   Date of Service: 5/8/2025 I Hospital Day: 3    Assessment & Plan  Incisional hernia, without obstruction or gangrene  65-year-old female s/p open ventral hernia repair with retrorectus phasic mesh, and unilateral external oblique release on 5/5. SLIM recommending trial of Januvia for elevated glucose and referral to nutritionist.    Right JENNIFER drain: 15 cc serosanguineous  Left JENNIFER drain: 140cc serosanguineous    Plan  -F/u CM VNA  -Regular diet  -Monitor JENNIFER drain output and character  -SSI  -Prn analgesia and antiemetics  -PT/OT  -Restarted on Eliquis   -Wean O2  -D/C within the next 24-48 hours    Please contact the SecureChat role for the Surgery-General service with any questions/concerns.    Subjective   NAEON. AVSS on 2L NC. Patient reports pain is fairly well controlled. Tolerating diet. Voiding spontaneously and passing flatus but not yet having bowel movements. Denies fever, chills, nausea, vomiting.     Objective :  Temp:  [96.2 °F (35.7 °C)-98.6 °F (37 °C)] 98.6 °F (37 °C)  HR:  [64-71] 68  BP: (102-122)/(50-69) 102/50  Resp:  [18] 18  SpO2:  [89 %-92 %] 92 %  O2 Device: Nasal cannula  Nasal Cannula O2 Flow Rate (L/min):  [2 L/min] 2 L/min    I/O         05/06 0701  05/07 0700 05/07 0701  05/08 0700    P.O. 480 1198    I.V. (mL/kg)      IV Piggyback      Total Intake(mL/kg) 480 (4.9) 1198 (12.3)    Urine (mL/kg/hr) 1800 (0.8) 900 (0.6)    Drains 480 125    Blood      Total Output 2280 1025    Net -1800 +173          Unmeasured Urine Occurrence 2 x           Lines/Drains/Airways       Active Status       Name Placement date Placement time Site Days    Closed/Suction Drain Right RLQ Bulb 15 Fr. 05/05/25  1746  RLQ  2    Closed/Suction Drain Left LLQ Bulb 15 Fr. 05/05/25  1748  LLQ  2                  Physical Exam  General: NAD  HENT: NCAT MMM  Neck: supple, no  JVD  CV: nl rate  Lungs: nl wob. No resp distress  ABD: Soft, appropriately tender, nondistended.  Incision with mepilex dressing in place.  Abdominal binder in place.  JENNIFER x 2 with serosanguineous drainage.  Extrem: No CCE  Neuro: AAOx3      Lab Results: I have reviewed the following results:  Recent Labs     05/06/25  0432 05/07/25  0426   WBC 12.56* 10.49*   HGB 10.1* 9.6*   HCT 30.1* 29.9*    232   SODIUM 138 140   K 3.9 4.2    106   CO2 27 29   BUN 14 22   CREATININE 0.89 0.82   GLUC 288* 178*   MG 2.4  --    PHOS 2.1* 3.0       VTE Pharmacologic Prophylaxis: VTE covered by:  apixaban, Oral, 5 mg at 05/07/25 2129      VTE Mechanical Prophylaxis: sequential compression device    Frederick Kelley MD  General Surgery Resident

## 2025-05-09 ENCOUNTER — TELEPHONE (OUTPATIENT)
Dept: SURGERY | Facility: CLINIC | Age: 65
End: 2025-05-09

## 2025-05-09 ENCOUNTER — HOME CARE VISIT (OUTPATIENT)
Dept: HOME HEALTH SERVICES | Facility: HOME HEALTHCARE | Age: 65
End: 2025-05-09
Payer: COMMERCIAL

## 2025-05-09 ENCOUNTER — TELEPHONE (OUTPATIENT)
Age: 65
End: 2025-05-09

## 2025-05-09 ENCOUNTER — PATIENT OUTREACH (OUTPATIENT)
Dept: CASE MANAGEMENT | Facility: OTHER | Age: 65
End: 2025-05-09

## 2025-05-09 VITALS
SYSTOLIC BLOOD PRESSURE: 112 MMHG | OXYGEN SATURATION: 92 % | DIASTOLIC BLOOD PRESSURE: 62 MMHG | HEART RATE: 67 BPM | RESPIRATION RATE: 18 BRPM

## 2025-05-09 DIAGNOSIS — G89.18 ACUTE POSTOPERATIVE PAIN: Primary | ICD-10-CM

## 2025-05-09 PROCEDURE — 400013 VN SOC

## 2025-05-09 PROCEDURE — G0299 HHS/HOSPICE OF RN EA 15 MIN: HCPCS

## 2025-05-09 RX ORDER — OXYCODONE AND ACETAMINOPHEN 5; 325 MG/1; MG/1
1 TABLET ORAL EVERY 4 HOURS PRN
Qty: 6 TABLET | Refills: 0 | Status: SHIPPED | OUTPATIENT
Start: 2025-05-09 | End: 2025-05-12

## 2025-05-09 NOTE — CASE COMMUNICATION
Medication discrepancies or Major drug interactions there is a major drug interaction between apixaban and diltiazem and apixaban and Diclofenac Sodium  Abnormal clinical findings pt is complaining of dizziness and increased fatigue. pt reports she had a BM this morning that she thought looked dark. Pt instructed to contniue to monitor for signs of bleeding and to report them as appropriate  This report is informational only, no respons e is needed  [facility] to Home Health service with the following disciplines: SN, PT and OT  Patient stated goals of care pt would like to increase her strength and to heal from surgery  Potential barriers to goal achievement pt lives alone with little to no assistance  Primary focus of home health care:Digestive  Anticipated visit pattern and next visit date 2w3 5.10.25  Thank you for allowing us to parti cipate in the care of your patient.      Umu Ritchie RN

## 2025-05-09 NOTE — UTILIZATION REVIEW
NOTIFICATION OF ADMISSION DISCHARGE   This is a Notification of Discharge from [facility]. Please be advised that this patient has been discharge from our facility. Below you will find the admission and discharge date and time including the patient’s disposition.   UTILIZATION REVIEW CONTACT:  Utilization Review Assistants  Network Utilization Review Department  Phone: [phone] x carefully listen to the prompts. All voicemails are confidential.  Email: [email or web address]     ADMISSION INFORMATION  PRESENTATION DATE: 5/5/2025 11:38 AM  OBERVATION ADMISSION DATE: N/A  INPATIENT ADMISSION DATE: 5/5/25  1:47 PM   DISCHARGE DATE: 5/8/2025  7:23 PM   DISPOSITION:Home with Home Health Care    [facility] Utilization Review Department  ATTENTION: Please call with any questions or concerns to [phone] and carefully listen to the prompts so that you are directed to the right person. All voicemails are confidential.   For Discharge needs, contact Care Management DC Support Team at [phone] opt. 2  Send all requests for admission clinical reviews, approved or denied determinations and any other requests to dedicated fax number below belonging to the campus where the patient is receiving treatment. List of dedicated fax numbers for the Facilities:  FACILITY NAME UR FAX NUMBER   ADMISSION DENIALS (Administrative/Medical Necessity) [phone]   DISCHARGE SUPPORT TEAM ([facility]) [phone]   PARENT CHILD HEALTH (Maternity/NICU/Pediatrics) [phone]   [facility] [phone]   [facility] [phone]   [facility] [phone]   [facility] [phone]   [facility] [phone]   [facility] [phone]   [facility] [phone]   [facility] [phone]   [facility]  [facility] [phone]   [facility] [phone]   [facility] [phone]   [facility] [phone]

## 2025-05-09 NOTE — TELEPHONE ENCOUNTER
FOLLOW UP: Patient's CB# is [phone]    REASON FOR CONVERSATION: Post-op Problem    SYMPTOMS: Incisional Pain    OTHER: Patient had REPAIR HERNIA INCISIONAL 20 cm, retro rectus repair with Phasic mesh.  Unilateral myofascial release external oblique component seperation. TAP block (Abdomen) done on 5/5/25 by Dr.Daniel Rankin. Calling today as she is in a lot of pain. Was discharged from the hospital yesterday. Is taking the Oxycodone as directed. Was only given 6 tablets. Only has 2 left. Is also asking if the Visiting Nurses are coming out today to help her. Please advise.    DISPOSITION: Discuss with Provider and Call Back Patient

## 2025-05-09 NOTE — TELEPHONE ENCOUNTER
Spoke with patient advised her she is scheduled to have visiting nurses come out today and someone should be contacting her. Also went over her meds and medication that can help her control her pain. We will be sending over an additional  6 pills to hold her over the weekend.

## 2025-05-10 ENCOUNTER — HOME CARE VISIT (OUTPATIENT)
Dept: HOME HEALTH SERVICES | Facility: HOME HEALTHCARE | Age: 65
End: 2025-05-10
Payer: COMMERCIAL

## 2025-05-10 VITALS
TEMPERATURE: 97.5 F | SYSTOLIC BLOOD PRESSURE: 132 MMHG | DIASTOLIC BLOOD PRESSURE: 80 MMHG | OXYGEN SATURATION: 94 % | HEART RATE: 76 BPM | RESPIRATION RATE: 22 BRPM

## 2025-05-10 PROCEDURE — G0299 HHS/HOSPICE OF RN EA 15 MIN: HCPCS

## 2025-05-12 ENCOUNTER — TELEPHONE (OUTPATIENT)
Dept: OTHER | Facility: OTHER | Age: 65
End: 2025-05-12

## 2025-05-12 ENCOUNTER — TELEPHONE (OUTPATIENT)
Age: 65
End: 2025-05-12

## 2025-05-12 ENCOUNTER — PATIENT OUTREACH (OUTPATIENT)
Dept: CASE MANAGEMENT | Facility: HOSPITAL | Age: 65
End: 2025-05-12

## 2025-05-12 ENCOUNTER — HOME CARE VISIT (OUTPATIENT)
Dept: HOME HEALTH SERVICES | Facility: HOME HEALTHCARE | Age: 65
End: 2025-05-12
Payer: COMMERCIAL

## 2025-05-12 VITALS
SYSTOLIC BLOOD PRESSURE: 128 MMHG | OXYGEN SATURATION: 95 % | RESPIRATION RATE: 18 BRPM | TEMPERATURE: 97.5 F | HEART RATE: 67 BPM | DIASTOLIC BLOOD PRESSURE: 84 MMHG

## 2025-05-12 PROCEDURE — G0300 HHS/HOSPICE OF LPN EA 15 MIN: HCPCS

## 2025-05-12 NOTE — TELEPHONE ENCOUNTER
Needs virtual TCM appointment  Received: Today  Natividad Gordon PA-C  [facility] Clinical  It appears Latia has called the patient on 5/9 for TCM but I do not see a TCM note opened.  Please notify the patient for a TCM visit.  Discharged on 5/8/2025 needs appointment by May 22.  Thank you          Previous Messages    Case Communication Notes    Umu Ritchie RN filed at 5/9/2025  2:37 PM    Status: Signed   Medication discrepancies or Major drug interactions there is a major drug interaction between apixaban and diltiazem and apixaban and Diclofenac Sodium  Abnormal clinical findings pt is complaining of dizziness and increased fatigue. pt reports she had a BM this morning that she thought looked dark. Pt instructed to contniue to monitor for signs of bleeding and to report them as appropriate  This report is informational only, no respons e is needed  [facility] with the following disciplines: SN, PT and OT  Patient stated goals of care pt would like to increase her strength and to heal from surgery  Potential barriers to goal achievement pt lives alone with little to no assistance  Primary focus of home health care:Digestive  Anticipated visit pattern and next visit date 2w3 5.10.25  Thank you for allowing us to parti cipate in the care of your patient.      Umu Ritchie RN

## 2025-05-12 NOTE — TELEPHONE ENCOUNTER
Jaquelin - [facility] called and stated she will be entering an order because the patient was unsteady on her feet during her home visit.

## 2025-05-12 NOTE — TELEPHONE ENCOUNTER
"Patient stated, \"I have my appointment on 6/9/25 and would like to know if that appointment can be a hospital follow up.\"    Please call pt when office reopens   Thank you   "

## 2025-05-12 NOTE — PROGRESS NOTES
HRR referral received.    Chart reviewed. Patient with recent IP admit at hospitals 5/5-5/8 with ventral hernia and is s/p incisional hernia repair with retrorectus phasix mesh on 5/5 . Pt provided with RW at discharge. [facility]     PMH: HTN, AF, Migraines, PVD, carotid artery stenosis L, mesenteric artery thrombosis, CHF, CAMILLE, COPD, GERD, IHH, T2DM, A1C 6.9 on 4/29/25, [id number], anxiety, s/p small bowel resection    Call placed to patient. No answer and VM left with request for CB.    RNCM to follow.    Apt's:  5/19: Vascular Surgery  5/20: General Surgery  5/22: GI  6/9: PCP  6/12: Urology

## 2025-05-12 NOTE — TELEPHONE ENCOUNTER
L/m for patient to contact office to schedule a possible virtual TCM, advised her we will be in the office tomorrow after 8 am. Natividad suggested maybe scheduling the TCM when the visiting nurses were there as they had several questions about medications.

## 2025-05-13 ENCOUNTER — NURSE TRIAGE (OUTPATIENT)
Age: 65
End: 2025-05-13

## 2025-05-13 ENCOUNTER — TELEPHONE (OUTPATIENT)
Age: 65
End: 2025-05-13

## 2025-05-13 ENCOUNTER — HOME CARE VISIT (OUTPATIENT)
Dept: HOME HEALTH SERVICES | Facility: HOME HEALTHCARE | Age: 65
End: 2025-05-13
Payer: COMMERCIAL

## 2025-05-13 ENCOUNTER — OFFICE VISIT (OUTPATIENT)
Age: 65
End: 2025-05-13
Payer: COMMERCIAL

## 2025-05-13 ENCOUNTER — TRANSITIONAL CARE MANAGEMENT (OUTPATIENT)
Age: 65
End: 2025-05-13

## 2025-05-13 VITALS
WEIGHT: 218.8 LBS | TEMPERATURE: 97.8 F | HEART RATE: 70 BPM | BODY MASS INDEX: 37.36 KG/M2 | HEIGHT: 64 IN | RESPIRATION RATE: 16 BRPM | OXYGEN SATURATION: 99 % | SYSTOLIC BLOOD PRESSURE: 136 MMHG | DIASTOLIC BLOOD PRESSURE: 82 MMHG

## 2025-05-13 DIAGNOSIS — K43.9 VENTRAL HERNIA WITHOUT OBSTRUCTION OR GANGRENE: ICD-10-CM

## 2025-05-13 DIAGNOSIS — R10.12 LEFT UPPER QUADRANT PAIN: ICD-10-CM

## 2025-05-13 DIAGNOSIS — Z76.89 ENCOUNTER FOR SUPPORT AND COORDINATION OF TRANSITION OF CARE: Primary | ICD-10-CM

## 2025-05-13 DIAGNOSIS — R60.0 BILATERAL LEG EDEMA: ICD-10-CM

## 2025-05-13 DIAGNOSIS — R06.02 SHORTNESS OF BREATH: ICD-10-CM

## 2025-05-13 PROCEDURE — 93000 ELECTROCARDIOGRAM COMPLETE: CPT | Performed by: STUDENT IN AN ORGANIZED HEALTH CARE EDUCATION/TRAINING PROGRAM

## 2025-05-13 PROCEDURE — 99495 TRANSJ CARE MGMT MOD F2F 14D: CPT | Performed by: STUDENT IN AN ORGANIZED HEALTH CARE EDUCATION/TRAINING PROGRAM

## 2025-05-13 RX ORDER — FUROSEMIDE 20 MG/1
20 TABLET ORAL EVERY OTHER DAY
Qty: 3 TABLET | Refills: 0 | Status: SHIPPED | OUTPATIENT
Start: 2025-05-13 | End: 2025-05-18

## 2025-05-13 NOTE — PROGRESS NOTES
Transition of Care Visit:  Name: Nancy Jones      : 1960      MRN: [id number]  Encounter Provider: Mckay Pratt MD  Encounter Date: 2025   Encounter department: [facility] PRIMARY CARE    Assessment & Plan  Encounter for support and coordination of transition of care         Ventral hernia without obstruction or gangrene       Healing well and patient has appropriate follow-up with general surgery on .  Recommended continued follow-up and management per their office.  Bilateral leg edema    Orders:  •  Echo complete w/ contrast if indicated; Future  •  furosemide (LASIX) 20 mg tablet; Take 1 tablet (20 mg total) by mouth every other day for 3 doses  •  Elastic Bandages & Supports (Medical Compression Socks) MISC; Use in the morning  Echo ordered to further assess, will follow-up results with patient.  Counseled patient on importance of use of compression socks as well as keeping legs elevated to improve edema.  Provided patient with 3 extra doses of Lasix 20 mg to take as indicated above.  Advised patient to follow-up with office if symptoms worsen or fail to improve.  Shortness of breath    Orders:  •  Echo complete w/ contrast if indicated; Future  •  furosemide (LASIX) 20 mg tablet; Take 1 tablet (20 mg total) by mouth every other day for 3 doses  •  Elastic Bandages & Supports (Medical Compression Socks) MISC; Use in the morning  •  POCT ECG  Patient sating at 99% in office today, EKG in office negative for STEMI.  Advised patient to complete echo at earliest convenience.  Patient does have noted grade 1 diastolic dysfunction in the past.  Will follow-up results with patient.  Advised patient if symptoms worsen or fail to improve to go to the emergency room for further intervention and workup, especially given comorbidities.  Left upper quadrant pain    Orders:  •  POCT ECG  Per history, patient feels it may be cardiac.  EKG negative, advised patient to go to the emergency  room if symptoms worsen or fail to improve.       History of Present Illness     Transitional Care Management Review:   Nancy Jones is a 65 y.o. female here for TCM follow up.     During the TCM phone call patient stated:  TCM Call (since 4/29/2025)     Date and time call was made  5/9/2025 11:18 AM    Hospital care reviewed  Records reviewed    Patient was hospitialized at  [facility]    Date of Admission  05/05/25    Date of discharge  05/08/25    Diagnosis  Ventral Hernia withou Obstruction.    Disposition  Home    Were the patients medications reviewed and updated  Yes    Current Symptoms  Swelling      TCM Call (since 4/29/2025)     Post hospital issues  None    Scheduled for follow up?  Yes    Did you obtain your prescribed medications  Yes    Do you need help managing your prescriptions or medications  No    Is transportation to your appointment needed  No    I have advised the patient to call PCP with any new or worsening symptoms  [facility]    Living Arrangements  Alone    Support System  Family    The type of support provided  Emotional; Financial; Physical    Do you have social support  Yes, as much as I need        HPI  Patient presenting today following hospitalization at [facility] for ventral hernia without obstruction from 5/5 to 5/8.  Patient had a hernia repair  with retrorectus phasix mesh on 5/5.  Patient does have some left upper quadrant abdominal pain however feels it may be cardiac in nature.    Review of Systems   Constitutional:  Negative for chills and fever.   HENT:  Negative for sore throat.    Respiratory:  Positive for shortness of breath. Negative for cough.    Cardiovascular:  Negative for chest pain and palpitations.   Gastrointestinal:  Negative for abdominal pain, constipation, diarrhea, nausea and vomiting.   Genitourinary:  Negative for difficulty urinating and dysuria.   Neurological:  Negative for dizziness and headaches.     Objective   BP  "136/82 (BP Location: Left arm, Patient Position: Sitting, Cuff Size: Standard)   Pulse 70   Temp 97.8 °F (36.6 °C) (Temporal)   Resp 16   Ht 5' 4\" (1.626 m)   Wt 99.2 kg (218 lb 12.8 oz)   LMP  (LMP Unknown)   SpO2 99%   BMI 37.56 kg/m²     Physical Exam  Constitutional:       General: She is not in acute distress.     Appearance: Normal appearance. She is obese. She is not ill-appearing.   HENT:      Head: Normocephalic and atraumatic.   Cardiovascular:      Rate and Rhythm: Normal rate and regular rhythm.      Heart sounds: Normal heart sounds. No murmur heard.  Pulmonary:      Effort: Pulmonary effort is normal. No respiratory distress.      Breath sounds: Normal breath sounds.   Abdominal:      Palpations: Abdomen is soft.      Tenderness: There is no abdominal tenderness.   Musculoskeletal:      Right lower leg: Edema (2+) present.      Left lower leg: Edema (2+) present.   Neurological:      Mental Status: She is alert.       Medications have been reviewed by provider in current encounter      "

## 2025-05-13 NOTE — TELEPHONE ENCOUNTER
Left voice message to have patient make TCM Appointment before 05/22/25. She needs to be seen sooner  than 06/09

## 2025-05-13 NOTE — TELEPHONE ENCOUNTER
PT states she cut the bulbs off thinking she was done draining and now her stomach is swelling.  I transferred her to Samantha in triage to help her

## 2025-05-13 NOTE — TELEPHONE ENCOUNTER
Pt calling in tearful saying she cut the drain tube bulbs off and reports she is feeling very bloated. Rescheduled pt for earliest appointment today to assess.   Pt is difficult to understand but states she is not in any pain but is very frustrated with herself.

## 2025-05-13 NOTE — TELEPHONE ENCOUNTER
"Received call from patient currently at the [facility] calling to report severe edema to bilat ankles.   Nurses on site were able to advise that Patient has +3 pitting edema bilaterally   Patient denies chest pain/sob and requesting apt     apt scheduled for today 1:40 pm   Reason for Disposition   SEVERE ankle swelling (e.g., can't move swollen ankle at all)    Answer Assessment - Initial Assessment Questions  1. LOCATION: \"Which ankle is swollen?\" \"Where is the swelling?\"      Ankles   2. ONSET: \"When did the swelling start?\"      This morning    3. SWELLING: \"How bad is the swelling?\" Or, \"How large is it?\" (e.g., mild, moderate, severe; size of localized swelling)       Moderate   4. PAIN: \"Is there any pain?\" If Yes, ask: \"How bad is it?\" (Scale 0-10; or none, mild, moderate, severe)      0  5. CAUSE: \"What do you think caused the ankle swelling?\"      Unsure   6. OTHER SYMPTOMS: \"Do you have any other symptoms?\" (e.g., fever, chest pain, difficulty breathing, calf pain)      NO  7. PREGNANCY: \"Is there any chance you are pregnant?\" \"When was your last menstrual period?\"      no    Protocols used: Ankle Swelling-Adult-[place]    "

## 2025-05-14 ENCOUNTER — HOME CARE VISIT (OUTPATIENT)
Dept: HOME HEALTH SERVICES | Facility: HOME HEALTHCARE | Age: 65
End: 2025-05-14
Payer: COMMERCIAL

## 2025-05-14 ENCOUNTER — TELEPHONE (OUTPATIENT)
Age: 65
End: 2025-05-14

## 2025-05-14 VITALS
TEMPERATURE: 97.2 F | RESPIRATION RATE: 18 BRPM | OXYGEN SATURATION: 94 % | HEART RATE: 65 BPM | DIASTOLIC BLOOD PRESSURE: 84 MMHG | SYSTOLIC BLOOD PRESSURE: 128 MMHG

## 2025-05-14 VITALS — SYSTOLIC BLOOD PRESSURE: 146 MMHG | OXYGEN SATURATION: 93 % | HEART RATE: 64 BPM | DIASTOLIC BLOOD PRESSURE: 70 MMHG

## 2025-05-14 VITALS
TEMPERATURE: 97.8 F | DIASTOLIC BLOOD PRESSURE: 80 MMHG | OXYGEN SATURATION: 94 % | RESPIRATION RATE: 18 BRPM | SYSTOLIC BLOOD PRESSURE: 118 MMHG | HEART RATE: 76 BPM

## 2025-05-14 PROCEDURE — G0300 HHS/HOSPICE OF LPN EA 15 MIN: HCPCS

## 2025-05-14 PROCEDURE — G0151 HHCP-SERV OF PT,EA 15 MIN: HCPCS

## 2025-05-14 PROCEDURE — G0152 HHCP-SERV OF OT,EA 15 MIN: HCPCS

## 2025-05-14 NOTE — TELEPHONE ENCOUNTER
PT called in. PT post-op day 9 Procedure: REPAIR HERNIA INCISIONAL 20 cm, retro rectus repair with Phasic mesh.  Unilateral myofascial release external oblique component seperation. TAP block (Abdomen) with Dr. Rankin.     PT asking if she may shower and get incision wet.     Advised PT that she may shower, may use soap and water, and then pat incision dry. Advised PT that she may not submerge into any body of water. PT verbalized understanding and agreeable to plan.

## 2025-05-14 NOTE — CASE COMMUNICATION
PT evaluation completed with plan for one additional PT visit to further assess function including outdoor steps to exit apartment.

## 2025-05-15 ENCOUNTER — PATIENT OUTREACH (OUTPATIENT)
Dept: CASE MANAGEMENT | Facility: HOSPITAL | Age: 65
End: 2025-05-15

## 2025-05-15 NOTE — PROGRESS NOTES
"Complex care Management Note    Chart reviewed. Last CM outreach was on 5/12. Patient continues with services through [facility]. Pt had PCP REE apt on 5/13. Pt noted to have sob and bilateral LE edema at visit. Patient to take an extra Lasix 20 mg every other day x 3 doses. Pt to continue with regular daily dose of Lasix 40 mg daily. Pt to wear compression stockings and put feet up during the day. 2D echo ordered.     PMH: HTN, AF, Migraines, PVD, carotid artery stenosis L, mesenteric artery thrombosis, CHF, CAMILLE, COPD, GERD, IHH, T2DM, A1C 6.9 on 4/29/25, BSA3t-e, anxiety, s/p small bowel resection     Call placed to patient. No answer and message comes on stating: \"call cannot be completed at this time please try again later\". Attempted several times. Unable to leave message.    Outreach #2 without response and an UTR letter sent via [facility] and CCM referral closed.      Apt's:  5/19: Vascular Surgery  5/20: General Surgery  5/22: GI  6/9: PCP  6/12: Urology     "

## 2025-05-15 NOTE — LETTER
Date: 05/15/25  Dear Nancy Jones,   My name is Dominique; I am a registered nurse care manager working with Dr Ayoub's office.  I have not been able to reach you and would like to set a time that I can talk with you over the phone.  My work is to help patients that have complex medical conditions get the care they need. This includes patients who may have been in the hospital or emergency room.    Please call me with any questions you may have. I look forward to speaking with you.  Sincerely,  Dominique Sanchez, RAMINCM  [phone]  Outpatient Care Manager

## 2025-05-15 NOTE — PROGRESS NOTES
Assessment/Plan:   Nancy Jones is a 65 y.o.female who comes in today for postoperative check after REPAIR HERNIA INCISIONAL 22 cm. retrorectus repair with Phasix mesh.  Unilateral myofascial release external oblique component seperation. TAP block with Dr. Rankin on 05/05/2025.    Pathology: None sent    Patient is very pleased with the outcome of their surgery and overall doing better. All staples from midline incision were removed. Clean, dry, intact. Right drain was removed and patient tolerated well. Instructed patient this drain site will close on its own. Covered with gauze and tape. Instructed patient to continue to use abdominal binder. Left JENNIFER drain still having an output of about 150ml daily of serosanguinous fluid. Patient will continue to track output in left EJNNIFER drain, hopefully to be removed next week. Will follow up next week. All questions asked and answered.       Postoperative restrictions reviewed, including specific lifting and exercise restrictions. All questions answered.     ______________________________________________________  HPI:  Nancy Jones is a 65 y.o.female who comes in today for postoperative check after recent  REPAIR HERNIA INCISIONAL 22 cm. retrorectus repair with Phasix mesh.  Unilateral myofascial release external oblique component seperation. TAP block with Dr. Rankin on 05/05/2025.    Currently doing well without problems, no fever or chills,no nausea and no vomiting. No chest pain or trouble breathing.     Reports she is doing better than last week. Will take her tramadol if the pain is more severe but overall has less pain. Midline incision still has staples. Right JENNIFER drain has less than 15ml daily the past few days. The left JENNIFER drain has an output of about 150ml daily of serosanguinous fluid with some clots at times. Normal bowel movements. Appetite back to her normal. Has been able to get around more and has PT coming a few times a week.     ROS:  General ROS:  "negative for - chills, fatigue, fever or night sweats, weight loss  Respiratory ROS: no cough, shortness of breath, or wheezing  Cardiovascular ROS: no chest pain or dyspnea on exertion  Genito-Urinary ROS: no dysuria, trouble voiding, or hematuria  Musculoskeletal ROS: negative for - gait disturbance, joint pain or muscle pain  Neurological ROS: no TIA or stroke symptoms  GI ROS: see HPI  Skin ROS: no new rashes or lesions   Lymphatic ROS: no new adenopathy noted by pt.   GYN ROS: see HPI, no new GYN history or bleeding noted  Psy ROS: no new mental or behavioral disturbances       Problem List[1]      Ace inhibitors, Benicar [olmesartan], Hydralazine, Other, Valsartan, Ampicillin, Keflex [cephalexin], Sulfa antibiotics, Motrin [ibuprofen], and Wound dressing adhesive    Current Medications[2]    Past Medical History:   Diagnosis Date    A-fib ([facility]) 03/2020    Allergic     Anxiety     Arthritis     Asthma     Back pain     and muscle pain    Bruises easily     Chronic pain disorder     Interstitial pain    Colon polyp     Dental bridge present     Depression     Eczema     GERD (gastroesophageal reflux disease)     Heart murmur     History of blood clots     per pt \"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\"    History of pneumonia     Hives     Hyperlipidemia     Hypertension     Infertility, female     Interstitial cystitis     Migraine     sees neurologist    Motion sickness     Obesity     Palpitations     PONV (postoperative nausea and vomiting)     Premature atrial contractions 11/09/2022    Psychiatric disorder     Sleep apnea     TIA (transient ischemic attack) 09/2022    per pt --\"had MRI and 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\"    Urinary incontinence     wears Pads    Venous insufficiency 08/01/2017    Wears glasses        Past Surgical History:   Procedure Laterality Date    COLONOSCOPY      DILATION AND CURETTAGE OF UTERUS      EGD      EXPLORATORY LAPAROTOMY      for infertility    " EXPLORATORY LAPAROTOMY W/ BOWEL RESECTION N/A 7/19/2023    Procedure: LAPAROTOMY EXPLORATORY W/ BOWEL RESECTION;  Surgeon: Crista Recinos MD;  [facility];  Service: General    HAND SURGERY      Hand excision of tendon cyst    AR LAPAROSCOPIC APPENDECTOMY N/A 05/03/2022    Procedure: APPENDECTOMY LAPAROSCOPIC;  Surgeon: Vin Fields MD;  [facility];  Service: General    AR LAPS ABD PRTM&OMENTUM DX W/WO SPEC BR/WA SPX N/A 7/19/2023    Procedure: LAPAROSCOPY DIAGNOSTIC, LYSIS OF ADHESIONS, LAPAROSCOPY TAKE TAKEDOWN OF LEFT COLON, EXPLORATORY LAPAROSCOPY, LYSIS OF ADHESIONS, RESECTION OF TRANSVERSE COLON SPLENIC FLEXURE AND DESCENDING COLON , TAKE DOWN OF SPLENIC FLEXURE, SIGMOIDOSCOPY, MOBILIZATION OF RIGHT COLON, PRIMARY ANASTOMOSIS EEA 29, TAP BLOCK;  Surgeon: Crista Recinos MD;  [facility];  Service: General    AR RPR AA HERNIA RECR > 10 CM REDUCIBLE N/A 5/5/2025    Procedure: REPAIR HERNIA INCISIONAL 20 cm, retro rectus repair with Phasic mesh.  Unilateral myofascial release external oblique component seperation. TAP block;  Surgeon: Jewel Rankin MD;  [facility];  Service: General    AR TCAT IV STENT CRV CRTD ART EMBOLIC PROTECJ Left 4/29/2024    Procedure: ANGIOPLASTY ARTERY CAROTID W/ STENT;  Surgeon: Roberto García DO;  [facility];  Service: Vascular    AR TEAEC W/PATCH GRF CAROTID VERTB SUBCLAV NECK INC Left 1/31/2023    Procedure: EXPLORATION OF LEFT CAROTID ARTERY; ABORTED ENDARTERECTOMY ARTERY CAROTID;  Surgeon: Roberto García DO;  Location[facility];  Service: Vascular    SUPERIOR MESTENTERIC ARTERY STENT      WISDOM TOOTH EXTRACTION         Family History   Problem Relation Age of Onset    Lung cancer Mother 60    Brain cancer Mother 60    Diabetes Father     Depression Father     Other Father         septic    Allergies Sister     Hashimoto's thyroiditis Sister     Abdominal aortic aneurysm Sister     Diabetes Sister     No Known  Problems Sister     No Known Problems Maternal Grandmother     No Known Problems Maternal Grandfather     No Known Problems Paternal Grandmother     No Known Problems Paternal Grandfather     Hodgkin's lymphoma Brother     No Known Problems Brother     No Known Problems Maternal Aunt     Diabetes Other     Breast cancer Neg Hx         reports that she quit smoking about 6 years ago. Her smoking use included cigarettes. She started smoking about 16 years ago. She has a 5 pack-year smoking history. She has been exposed to tobacco smoke. She has never used smokeless tobacco. She reports that she does not currently use alcohol. She reports that she does not use drugs.    There were no vitals filed for this visit.    PHYSICAL EXAM  General: normal, cooperative, no distress  Abdominal: soft, nondistended, or nontender. Right JENNIFER drain removed. Covered with gauze and tape. Left JENNIFER drain intact and in place with serosanguinous fluid in bulb.  Incision: clean, dry, and intact and healing well. All staples removed.       UJD Haynes    Date: 5/15/2025 Time: 7:53 AM           [1]   Patient Active Problem List  Diagnosis    Angina pectoris (HCC)    Essential hypertension    Migraines    AMD (age-related macular degeneration), bilateral    Allergic reaction    Gastroesophageal reflux disease without esophagitis    Angio-edema    A-fib (HCC)    Wheezing    Lumbar radiculopathy    CAMILLE (obstructive sleep apnea)    Hypertensive heart disease with congestive heart failure (HCC)    Unspecified diastolic (congestive) heart failure (HCC)    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HCC)    COPD (chronic obstructive pulmonary disease) (HCC)    Urinary retention    Peripheral vascular disease (HCC)    Appendix disease    Carotid artery stenosis    Stenosis of left carotid artery    Asymptomatic stenosis of left carotid artery    Hepatic steatosis    Partial facial nerve palsy    Injury of left facial nerve    Paresthesia    Colitis     Chronic migraine w/o aura w/o status migrainosus, not intractable    IIH (idiopathic intracranial hypertension)    Hematochezia    Left carotid stenosis    Colonic ischemia ([facility])    Acute postoperative pain    S/P small bowel resection    Anxiety    Omental infarction ([facility])    Hypokalemia    Night sweats    Dysuria    Stage 3 chronic kidney disease, unspecified whether stage 3a or 3b CKD ([facility])    Atherosclerosis of native arteries of extremities with rest pain, bilateral legs ([facility])    Acute on chronic diastolic (congestive) heart failure ([facility])    Left facial numbness    Other chest pain    Acute pain of left knee    Acute lateral meniscus tear of left knee    Opioid dependence, uncomplicated (HCC)    Obesity, morbid ([facility])    Complex tear of lateral meniscus of left knee as current injury, initial encounter    Folliculitis    Shingles    Diabetes due to undrl condition w oth diabetic neuro comp ([facility])    Financial difficulties    Rib contusion, right, subsequent encounter    Pharyngitis due to Streptococcus species   [2]   Current Outpatient Medications:     acetaminophen (TYLENOL) 325 mg tablet, Take 2 tablets (650 mg total) by mouth every 6 (six) hours, Disp: , Rfl: 0    Alcohol Swabs 70 % PADS, May substitute brand based on insurance coverage. Check glucose BID., Disp: 100 each, Rfl: 0    apixaban (Eliquis) 5 mg, Take 1 tablet (5 mg total) by mouth 2 (two) times a day, Disp: 180 tablet, Rfl: 3    Blood Glucose Monitoring Suppl (OneTouch Verio Reflect) w/Device KIT, May substitute brand based on insurance coverage. Check glucose BID., Disp: 1 kit, Rfl: 0    chlordiazepoxide-clidinium (LIBRAX) 5-2.5 mg per capsule, TAKE 1 CAPSULE BY MOUTH TWO TIMES DAILY AS NEEDED FOR INDIGESTION, Disp: 60 capsule, Rfl: 2    chlorhexidine (PERIDEX) 0.12 % solution, Apply 15 mL to the mouth or throat 2 (two) times a day, Disp: 120 mL, Rfl: 0    dexamethasone (DECADRON) 2 mg tablet, One tab with breakfast (early in day to minimize  impact on sleep, with food for stomach protection) for 1-5 days for unrelenting migraine, Disp: 5 tablet, Rfl: 0    Diclofenac Sodium (VOLTAREN) 1 %, Apply 2 g topically 4 (four) times a day, Disp: 100 g, Rfl: 1    diltiazem (CARDIZEM CD) 240 mg 24 hr capsule, Take 1 capsule (240 mg total) by mouth daily, Disp: 100 capsule, Rfl: 0    Docusate Sodium (COLACE PO), Take by mouth daily at bedtime, Disp: , Rfl:     Elastic Bandages & Supports (Medical Compression Socks) MISC, Use in the morning, Disp: 4 each, Rfl: 0    Evolocumab (Repatha SureClick) 140 MG/ML SOAJ, Inject 1 mL (140 mg total) under the skin every 14 (fourteen) days, Disp: 6 mL, Rfl: 5    fexofenadine (ALLEGRA) 180 MG tablet, Take 180 mg by mouth daily, Disp: , Rfl:     fluticasone-vilanterol (Breo Ellipta) 200-25 mcg/actuation inhaler, Inhale 1 puff daily Rinse mouth after use., Disp: 200 blister, Rfl: 1    furosemide (LASIX) 20 mg tablet, Take 1 tablet (20 mg total) by mouth every other day for 3 doses, Disp: 3 tablet, Rfl: 0    furosemide (LASIX) 40 mg tablet, Take 1 tablet (40 mg total) by mouth daily (Patient taking differently: Take 1 tablet by mouth daily. Starting 5/13/25- patient to take 60mg PO every other day for 3 days then resume 40mg PO daily. ), Disp: 90 tablet, Rfl: 1    glucose blood (OneTouch Verio) test strip, May substitute brand based on insurance coverage. Check glucose BID., Disp: 100 each, Rfl: 0    LORazepam (ATIVAN) 2 mg tablet, Take 1 tablet (2 mg total) by mouth every 8 (eight) hours as needed for anxiety, Disp: 90 tablet, Rfl: 5    magnesium Oxide (MAG-OX) 400 mg TABS, Take 1 tablet (400 mg total) by mouth daily at bedtime (Patient not taking: Reported on 5/13/2025), Disp: 90 tablet, Rfl: 4    Meth-Hyo-M Bl-Na Phos-Ph Sal (Uribel) 118 MG CAPS, Take 1 capsule (118 mg total) by mouth 3 (three) times a day (Patient not taking: Reported on 4/23/2025), Disp: 90 capsule, Rfl: 3    methocarbamol (ROBAXIN) 500 mg tablet, TAKE TWO  TABLETS BY MOUTH THREE TIMES A DAY, Disp: 180 tablet, Rfl: 2    metoprolol succinate (TOPROL-XL) 100 mg 24 hr tablet, Take 1 tablet (100 mg total) by mouth 2 (two) times a day, Disp: 180 tablet, Rfl: 1    naloxone (NARCAN) 4 mg/0.1 mL nasal spray, Administer 1 spray into a nostril. If no response after 2-3 minutes, give another dose in the other nostril using a new spray., Disp: 1 each, Rfl: 1    omeprazole (PriLOSEC) 40 MG capsule, Take 1 capsule (40 mg total) by mouth daily, Disp: 90 capsule, Rfl: 1    ondansetron (ZOFRAN-ODT) 4 mg disintegrating tablet, Take 1 tablet (4 mg total) by mouth every 8 (eight) hours as needed for nausea, Disp: 21 tablet, Rfl: 5    OneTouch Delica Lancets 33G MISC, May substitute brand based on insurance coverage. Check glucose BID., Disp: 100 each, Rfl: 0    polyethylene glycol (Golytely) 4000 mL solution, Take 4,000 mL by mouth once for 1 dose Take 4000 mL by mouth once for 1 dose. Use as directed, Disp: 4000 mL, Rfl: 0    polyethylene glycol (MIRALAX) 17 g packet, Take 17 g by mouth 2 (two) times a day, Disp: 1020 g, Rfl: 2    polyethylene glycol (MiraLax) 17 GM/SCOOP powder, Take 238 g by mouth once for 1 dose Take 238 g my mouth. Use as directed, Disp: 238 g, Rfl: 0    promethazine (PHENERGAN) 25 mg tablet, TAKE 1 TABLET BY MOUTH EVERY 6 HOURS AS NEEDED FOR NAUSEA AND/OR VOMITING, Disp: 120 tablet, Rfl: 1    sitaGLIPtin (JANUVIA) 25 mg tablet, Take 1 tablet (25 mg total) by mouth daily Do not start before May 7, 2025., Disp: 30 tablet, Rfl: 0    spironolactone (ALDACTONE) 25 mg tablet, Take 1 tablet (25 mg total) by mouth 2 (two) times a day, Disp: 180 tablet, Rfl: 1    sucralfate (CARAFATE) 1 g tablet, Take 1 tablet (1 g total) by mouth 4 (four) times a day, Disp: 120 tablet, Rfl: 5    traMADol (ULTRAM) 50 mg tablet, Take 2 tablets (100 mg total) by mouth every 8 (eight) hours as needed for moderate pain, Disp: 180 tablet, Rfl: 5    triamcinolone (KENALOG) 0.1 % ointment, Apply  topically 2 (two) times a day START applying triamcinolone 0.1% ointment to the affected area twice daily for up to 2 weeks for flares. Do not use for more than 2 weeks., Disp: 80 g, Rfl: 3    Current Facility-Administered Medications:     cyanocobalamin injection 1,000 mcg, 1,000 mcg, Intramuscular, Q30 Days, Coy Ayoub DO, 1,000 mcg at 03/28/25 0989

## 2025-05-16 ENCOUNTER — HOME CARE VISIT (OUTPATIENT)
Dept: HOME HEALTH SERVICES | Facility: HOME HEALTHCARE | Age: 65
End: 2025-05-16
Payer: COMMERCIAL

## 2025-05-16 VITALS — HEART RATE: 51 BPM | DIASTOLIC BLOOD PRESSURE: 60 MMHG | OXYGEN SATURATION: 91 % | SYSTOLIC BLOOD PRESSURE: 110 MMHG

## 2025-05-16 PROCEDURE — G0152 HHCP-SERV OF OT,EA 15 MIN: HCPCS

## 2025-05-17 ENCOUNTER — HOME CARE VISIT (OUTPATIENT)
Dept: HOME HEALTH SERVICES | Facility: HOME HEALTHCARE | Age: 65
End: 2025-05-17
Payer: COMMERCIAL

## 2025-05-17 NOTE — CASE COMMUNICATION
1500...Patient called into office to see what kind of exercises she could do to help increase her strenght as she is still weak. Notified to continue to do whatever kind of exercises therapy had instructed her to do. She verbalized understanding.

## 2025-05-19 ENCOUNTER — HOME CARE VISIT (OUTPATIENT)
Dept: HOME HEALTH SERVICES | Facility: HOME HEALTHCARE | Age: 65
End: 2025-05-19
Payer: COMMERCIAL

## 2025-05-19 ENCOUNTER — OFFICE VISIT (OUTPATIENT)
Dept: SURGERY | Facility: CLINIC | Age: 65
End: 2025-05-19

## 2025-05-19 VITALS
TEMPERATURE: 98.2 F | BODY MASS INDEX: 36.7 KG/M2 | OXYGEN SATURATION: 92 % | RESPIRATION RATE: 16 BRPM | SYSTOLIC BLOOD PRESSURE: 132 MMHG | WEIGHT: 215 LBS | HEART RATE: 57 BPM | DIASTOLIC BLOOD PRESSURE: 78 MMHG | HEIGHT: 64 IN

## 2025-05-19 DIAGNOSIS — Z98.890 S/P REPAIR OF VENTRAL HERNIA: Primary | ICD-10-CM

## 2025-05-19 DIAGNOSIS — Z87.19 S/P REPAIR OF VENTRAL HERNIA: Primary | ICD-10-CM

## 2025-05-20 ENCOUNTER — TELEPHONE (OUTPATIENT)
Dept: HOME HEALTH SERVICES | Facility: HOME HEALTHCARE | Age: 65
End: 2025-05-20

## 2025-05-20 ENCOUNTER — HOME CARE VISIT (OUTPATIENT)
Dept: HOME HEALTH SERVICES | Facility: HOME HEALTHCARE | Age: 65
End: 2025-05-20
Payer: COMMERCIAL

## 2025-05-21 ENCOUNTER — TELEPHONE (OUTPATIENT)
Age: 65
End: 2025-05-21

## 2025-05-21 ENCOUNTER — NURSE TRIAGE (OUTPATIENT)
Age: 65
End: 2025-05-21

## 2025-05-21 ENCOUNTER — HOME CARE VISIT (OUTPATIENT)
Dept: HOME HEALTH SERVICES | Facility: HOME HEALTHCARE | Age: 65
End: 2025-05-21
Payer: COMMERCIAL

## 2025-05-21 VITALS
OXYGEN SATURATION: 88 % | HEART RATE: 74 BPM | SYSTOLIC BLOOD PRESSURE: 148 MMHG | DIASTOLIC BLOOD PRESSURE: 68 MMHG | TEMPERATURE: 102 F

## 2025-05-21 PROCEDURE — G0152 HHCP-SERV OF OT,EA 15 MIN: HCPCS

## 2025-05-21 NOTE — TELEPHONE ENCOUNTER
"FOLLOW UP: Go to ED, follow up with General Surgery     REASON FOR CONVERSATION: Post-op Problem    SYMPTOMS: Fever, nausea, confusion, SPO2 of 88%, /68     OTHER: [facility] OT called and in agreement that patient should go to the ED for evaluation and treatment. OT advised to follow up with Gen Surg as well.     DISPOSITION: Go to ED/C Now (Or to Office with PCP Approval)      Reason for Disposition   Patient sounds very sick or weak to the triager    Answer Assessment - Initial Assessment Questions  1. SYMPTOM: \"What's the main symptom you're concerned about?\" (e.g., pain, fever, vomiting)      Fever, nausea, mild confusion  2. ONSET: \"When did they start?\"      S/p tube removal   3. SURGERY: \"What surgery did you have?\"      Hernia repair   4. DATE of SURGERY: \"When was the surgery?\"       5/5/25  5. ANESTHESIA: \"What type of anesthesia did you have?\" (e.g., general, spinal, epidural, local)      Denies   6. DRAINS: \"Were any drains place in or around the wound?\" (e.g., Hemovac, Luis Armando-Victor, [place])      JENNIFER drain, lower abdomen  7. PAIN: \"Is there any pain?\" If Yes, ask: \"How bad is it?\"  (Scale 1-10; or mild, moderate, severe)      Mild   8. FEVER: \"Do you have a fever?\" If Yes, ask: \"What is your temperature, how was it measured, and when did it start?\"      Fever  9. VOMITING: \"Is there any vomiting?\" If Yes, ask: \"How many times?\"      Denies   10. BLEEDING: \"Is there any bleeding?\" If Yes, ask: \"How much?\" and \"Where?\"        Denies   11. OTHER SYMPTOMS: \"Do you have any other symptoms?\" (e.g., drainage from wound, painful urination, constipation)        Hypoxia (88% on RA)  HTN (148/68)    Protocols used: Post-Op Symptoms and Questions-Adult-[place]    "

## 2025-05-21 NOTE — TELEPHONE ENCOUNTER
Spoke with patient offered an appointment tomorrow to be seen she is ok with waiting until her follow up with Massiel Escobar 5/27/25.She states she began with pain and nausea after getting her drain removed denies any fevers until the nurse advised her today she had one, Denies any urinary issues she is having normal bowels. She is taking tylenol to help with fever and zofran for the nausea, She is scheduled to see Gastro tomorrow and will call with anymore concerns,

## 2025-05-21 NOTE — TELEPHONE ENCOUNTER
Butch, home care nurse, calling in to provide update about pt condition.   Butch states pt has temp of 102 today, /68, HR 72 and pulse ox 88. Mild confusion and nausea present.   Advised Butch pt should go to ER; he states he recommended this to pt however she does not want to go.

## 2025-05-22 ENCOUNTER — APPOINTMENT (EMERGENCY)
Dept: CT IMAGING | Facility: HOSPITAL | Age: 65
End: 2025-05-22
Payer: COMMERCIAL

## 2025-05-22 ENCOUNTER — HOSPITAL ENCOUNTER (EMERGENCY)
Facility: HOSPITAL | Age: 65
Discharge: HOME | End: 2025-05-22
Attending: EMERGENCY MEDICINE
Payer: COMMERCIAL

## 2025-05-22 VITALS
HEART RATE: 89 BPM | DIASTOLIC BLOOD PRESSURE: 57 MMHG | RESPIRATION RATE: 18 BRPM | BODY MASS INDEX: 37.01 KG/M2 | SYSTOLIC BLOOD PRESSURE: 126 MMHG | OXYGEN SATURATION: 95 % | TEMPERATURE: 98.5 F | WEIGHT: 215.61 LBS

## 2025-05-22 DIAGNOSIS — S30.1XXA ABDOMINAL WALL SEROMA, INITIAL ENCOUNTER: Primary | ICD-10-CM

## 2025-05-22 DIAGNOSIS — R06.02 SHORTNESS OF BREATH: ICD-10-CM

## 2025-05-22 DIAGNOSIS — R10.10 UPPER ABDOMINAL PAIN: ICD-10-CM

## 2025-05-22 LAB
2HR DELTA HS TROPONIN: -1 NG/L
ALBUMIN SERPL BCG-MCNC: 3.6 G/DL (ref 3.5–5)
ALP SERPL-CCNC: 67 U/L (ref 34–104)
ALT SERPL W P-5'-P-CCNC: 7 U/L (ref 7–52)
ANION GAP SERPL CALCULATED.3IONS-SCNC: 9 MMOL/L (ref 4–13)
APTT PPP: 40 SECONDS (ref 23–34)
AST SERPL W P-5'-P-CCNC: 9 U/L (ref 13–39)
ATRIAL RATE: 64 BPM
ATRIAL RATE: 68 BPM
ATRIAL RATE: 69 BPM
BACTERIA UR QL AUTO: ABNORMAL /HPF
BASOPHILS # BLD AUTO: 0.07 THOUSANDS/ÂΜL (ref 0–0.1)
BASOPHILS NFR BLD AUTO: 1 % (ref 0–1)
BILIRUB SERPL-MCNC: 0.84 MG/DL (ref 0.2–1)
BILIRUB UR QL STRIP: NEGATIVE
BNP SERPL-MCNC: 184 PG/ML (ref 0–100)
BUN SERPL-MCNC: 14 MG/DL (ref 5–25)
CALCIUM SERPL-MCNC: 8.8 MG/DL (ref 8.4–10.2)
CARDIAC TROPONIN I PNL SERPL HS: 15 NG/L (ref ?–50)
CARDIAC TROPONIN I PNL SERPL HS: 16 NG/L (ref ?–50)
CHLORIDE SERPL-SCNC: 101 MMOL/L (ref 96–108)
CLARITY UR: CLEAR
CO2 SERPL-SCNC: 28 MMOL/L (ref 21–32)
COLOR UR: YELLOW
CREAT SERPL-MCNC: 0.68 MG/DL (ref 0.6–1.3)
EOSINOPHIL # BLD AUTO: 0.04 THOUSAND/ÂΜL (ref 0–0.61)
EOSINOPHIL NFR BLD AUTO: 0 % (ref 0–6)
ERYTHROCYTE [DISTWIDTH] IN BLOOD BY AUTOMATED COUNT: 13.7 % (ref 11.6–15.1)
FLUAV RNA RESP QL NAA+PROBE: NEGATIVE
FLUBV RNA RESP QL NAA+PROBE: NEGATIVE
GFR SERPL CREATININE-BSD FRML MDRD: 90 ML/MIN/1.73SQ M
GLUCOSE SERPL-MCNC: 139 MG/DL (ref 65–140)
GLUCOSE UR STRIP-MCNC: NEGATIVE MG/DL
HCT VFR BLD AUTO: 32.7 % (ref 34.8–46.1)
HGB BLD-MCNC: 10.5 G/DL (ref 11.5–15.4)
HGB UR QL STRIP.AUTO: NEGATIVE
IMM GRANULOCYTES # BLD AUTO: 0.06 THOUSAND/UL (ref 0–0.2)
IMM GRANULOCYTES NFR BLD AUTO: 0 % (ref 0–2)
INR PPP: 1.64 (ref 0.85–1.19)
KETONES UR STRIP-MCNC: ABNORMAL MG/DL
LACTATE SERPL-SCNC: 1.1 MMOL/L (ref 0.5–2)
LEUKOCYTE ESTERASE UR QL STRIP: NEGATIVE
LIPASE SERPL-CCNC: 6 U/L (ref 11–82)
LYMPHOCYTES # BLD AUTO: 1.33 THOUSANDS/ÂΜL (ref 0.6–4.47)
LYMPHOCYTES NFR BLD AUTO: 10 % (ref 14–44)
MCH RBC QN AUTO: 30 PG (ref 26.8–34.3)
MCHC RBC AUTO-ENTMCNC: 32.1 G/DL (ref 31.4–37.4)
MCV RBC AUTO: 93 FL (ref 82–98)
MONOCYTES # BLD AUTO: 0.99 THOUSAND/ÂΜL (ref 0.17–1.22)
MONOCYTES NFR BLD AUTO: 7 % (ref 4–12)
MUCOUS THREADS UR QL AUTO: ABNORMAL
NEUTROPHILS # BLD AUTO: 11.51 THOUSANDS/ÂΜL (ref 1.85–7.62)
NEUTS SEG NFR BLD AUTO: 82 % (ref 43–75)
NITRITE UR QL STRIP: NEGATIVE
NON-SQ EPI CELLS URNS QL MICRO: ABNORMAL /HPF
NRBC BLD AUTO-RTO: 0 /100 WBCS
P AXIS: 37 DEGREES
P AXIS: 44 DEGREES
P AXIS: 62 DEGREES
PH UR STRIP.AUTO: 6.5 [PH]
PLATELET # BLD AUTO: 266 THOUSANDS/UL (ref 149–390)
PMV BLD AUTO: 9.5 FL (ref 8.9–12.7)
POTASSIUM SERPL-SCNC: 3.2 MMOL/L (ref 3.5–5.3)
PR INTERVAL: 168 MS
PR INTERVAL: 172 MS
PR INTERVAL: 180 MS
PROCALCITONIN SERPL-MCNC: 1.18 NG/ML
PROT SERPL-MCNC: 6.1 G/DL (ref 6.4–8.4)
PROT UR STRIP-MCNC: ABNORMAL MG/DL
PROTHROMBIN TIME: 19.4 SECONDS (ref 12.3–15)
QRS AXIS: -29 DEGREES
QRS AXIS: -31 DEGREES
QRS AXIS: -32 DEGREES
QRSD INTERVAL: 88 MS
QRSD INTERVAL: 88 MS
QRSD INTERVAL: 90 MS
QT INTERVAL: 428 MS
QT INTERVAL: 446 MS
QT INTERVAL: 460 MS
QTC INTERVAL: 458 MS
QTC INTERVAL: 474 MS
QTC INTERVAL: 474 MS
RBC # BLD AUTO: 3.5 MILLION/UL (ref 3.81–5.12)
RBC #/AREA URNS AUTO: ABNORMAL /HPF
RSV RNA RESP QL NAA+PROBE: NEGATIVE
SARS-COV-2 RNA RESP QL NAA+PROBE: NEGATIVE
SODIUM SERPL-SCNC: 138 MMOL/L (ref 135–147)
SP GR UR STRIP.AUTO: >=1.05 (ref 1–1.03)
T WAVE AXIS: 49 DEGREES
T WAVE AXIS: 5 DEGREES
T WAVE AXIS: 8 DEGREES
UROBILINOGEN UR STRIP-ACNC: <2 MG/DL
VENTRICULAR RATE: 64 BPM
VENTRICULAR RATE: 68 BPM
VENTRICULAR RATE: 69 BPM
WBC # BLD AUTO: 14 THOUSAND/UL (ref 4.31–10.16)
WBC #/AREA URNS AUTO: ABNORMAL /HPF

## 2025-05-22 PROCEDURE — 84484 ASSAY OF TROPONIN QUANT: CPT

## 2025-05-22 PROCEDURE — NC001 PR NO CHARGE: Performed by: SURGERY

## 2025-05-22 PROCEDURE — 83690 ASSAY OF LIPASE: CPT

## 2025-05-22 PROCEDURE — 81001 URINALYSIS AUTO W/SCOPE: CPT

## 2025-05-22 PROCEDURE — 96375 TX/PRO/DX INJ NEW DRUG ADDON: CPT

## 2025-05-22 PROCEDURE — 71275 CT ANGIOGRAPHY CHEST: CPT

## 2025-05-22 PROCEDURE — 99285 EMERGENCY DEPT VISIT HI MDM: CPT

## 2025-05-22 PROCEDURE — 96374 THER/PROPH/DIAG INJ IV PUSH: CPT

## 2025-05-22 PROCEDURE — 83605 ASSAY OF LACTIC ACID: CPT

## 2025-05-22 PROCEDURE — 85025 COMPLETE CBC W/AUTO DIFF WBC: CPT

## 2025-05-22 PROCEDURE — 80053 COMPREHEN METABOLIC PANEL: CPT

## 2025-05-22 PROCEDURE — 99284 EMERGENCY DEPT VISIT MOD MDM: CPT

## 2025-05-22 PROCEDURE — 74177 CT ABD & PELVIS W/CONTRAST: CPT

## 2025-05-22 PROCEDURE — 93010 ELECTROCARDIOGRAM REPORT: CPT | Performed by: INTERNAL MEDICINE

## 2025-05-22 PROCEDURE — 96361 HYDRATE IV INFUSION ADD-ON: CPT

## 2025-05-22 PROCEDURE — 83880 ASSAY OF NATRIURETIC PEPTIDE: CPT

## 2025-05-22 PROCEDURE — 85610 PROTHROMBIN TIME: CPT

## 2025-05-22 PROCEDURE — 84145 PROCALCITONIN (PCT): CPT

## 2025-05-22 PROCEDURE — 85730 THROMBOPLASTIN TIME PARTIAL: CPT

## 2025-05-22 PROCEDURE — 93005 ELECTROCARDIOGRAM TRACING: CPT

## 2025-05-22 PROCEDURE — 0241U HB NFCT DS VIR RESP RNA 4 TRGT: CPT

## 2025-05-22 PROCEDURE — 36415 COLL VENOUS BLD VENIPUNCTURE: CPT

## 2025-05-22 RX ORDER — LORAZEPAM 2 MG/ML
0.5 INJECTION INTRAMUSCULAR ONCE
Status: COMPLETED | OUTPATIENT
Start: 2025-05-22 | End: 2025-05-22

## 2025-05-22 RX ORDER — ACETAMINOPHEN 10 MG/ML
1000 INJECTION, SOLUTION INTRAVENOUS ONCE
Status: COMPLETED | OUTPATIENT
Start: 2025-05-22 | End: 2025-05-22

## 2025-05-22 RX ORDER — FUROSEMIDE 40 MG/1
40 TABLET ORAL ONCE
Status: DISCONTINUED | OUTPATIENT
Start: 2025-05-22 | End: 2025-05-22 | Stop reason: HOSPADM

## 2025-05-22 RX ADMIN — IOHEXOL 100 ML: 350 INJECTION, SOLUTION INTRAVENOUS at 08:07

## 2025-05-22 RX ADMIN — SODIUM CHLORIDE 500 ML: 0.9 INJECTION, SOLUTION INTRAVENOUS at 13:07

## 2025-05-22 RX ADMIN — ACETAMINOPHEN 1000 MG: 10 INJECTION, SOLUTION INTRAVENOUS at 13:05

## 2025-05-22 RX ADMIN — SODIUM CHLORIDE 500 ML: 0.9 INJECTION, SOLUTION INTRAVENOUS at 07:11

## 2025-05-22 RX ADMIN — MORPHINE SULFATE 2 MG: 2 INJECTION, SOLUTION INTRAMUSCULAR; INTRAVENOUS at 07:21

## 2025-05-22 RX ADMIN — LORAZEPAM 0.5 MG: 2 INJECTION INTRAMUSCULAR; INTRAVENOUS at 10:24

## 2025-05-22 NOTE — ED NOTES
Pt attempted to provide urine sample. -unsuccessful at this time.      Nneka Garay  05/22/25 1047

## 2025-05-22 NOTE — CONSULTS
Consultation - Surgery-General   Name: Nancy Jones 65 y.o. female I MRN: [id number]  Unit/Bed#: ED-09 I Date of Admission: 5/22/2025   Date of Service: 5/22/2025 I Hospital Day: 0   Consults  Physician Requesting Evaluation: Za Humphrey DO   Reason for Evaluation / Principal Problem:  Post op hernia repair 5/5       Assessment & Plan  Abdominal wall seroma  65-year-old female status post ventral hernia repair 5/5, now presenting with fever at home feeling overall unwell    5/22 CT A/P: Postoperative changes in the anterior abdominal wall, with 13.5 cm seroma in the rectus sheath.  Afebrile, vital signs stable within normal limits on room air    JENNIFER drain output cloudy serous    WBC 14  Hemoglobin 10.5  Creatinine 0.68    Plan  -No inpatient admission required at this time, discharge home from the ED  -Follow-up with the general surgery office on Tuesday 5/27  -Maintain JENNIFER drain, monitor output and character daily  -Maintain abdominal binder  -Encourage ambulation/out of bed, 3 times daily at home  -Encourage incentive spirometer use at home      History of Present Illness   Nancy Jones is a 65 y.o. female who presents overall feeling unwell at home and reports of fever.  Patient is status post a ventral hernia repair 5/5.  Patient was previously seen in the outpatient general surgery office on 5/19, at that time patient was doing well, her right JENNIFER drain was removed and her midline staples were removed.    At this time patient is afebrile, vital signs stable within normal limits on room air.  Patient denies nausea vomiting fevers chills and shortness of breath.    Review of Systems  Historical Information   Past Medical History[1]  Past Surgical History[2]  Social History[3]  E-Cigarette/Vaping    E-Cigarette Use Never User      E-Cigarette/Vaping Substances    Nicotine No     THC No     CBD No     Flavoring No     Other No     Unknown No      Family History[4]  Social History[5]    Current  Facility-Administered Medications:     cyanocobalamin injection 1,000 mcg, Q30 Days    furosemide (LASIX) tablet 40 mg, Once  Prior to Admission Medications   Prescriptions Last Dose Informant Patient Reported? Taking?   Alcohol Swabs 70 % PADS  Self No No   Sig: May substitute brand based on insurance coverage. Check glucose BID.   Blood Glucose Monitoring Suppl (OneTouch Verio Reflect) w/Device KIT  Self No No   Sig: May substitute brand based on insurance coverage. Check glucose BID.   Diclofenac Sodium (VOLTAREN) 1 %  Self No No   Sig: Apply 2 g topically 4 (four) times a day   Patient not taking: Reported on 5/19/2025   Docusate Sodium (COLACE PO)  Self Yes No   Sig: Take by mouth daily at bedtime   Elastic Bandages & Supports (Medical Compression Socks) MISC  Self No No   Sig: Use in the morning   Evolocumab (Repatha SureClick) 140 MG/ML SOAJ  Self No No   Sig: Inject 1 mL (140 mg total) under the skin every 14 (fourteen) days   LORazepam (ATIVAN) 2 mg tablet  Self No No   Sig: Take 1 tablet (2 mg total) by mouth every 8 (eight) hours as needed for anxiety   Meth-Hyo-M Bl-Na Phos-Ph Sal (Uribel) 118 MG CAPS  Self No No   Sig: Take 1 capsule (118 mg total) by mouth 3 (three) times a day   Patient not taking: Reported on 4/23/2025   OneTouch Delica Lancets 33G MISC  Self No No   Sig: May substitute brand based on insurance coverage. Check glucose BID.   acetaminophen (TYLENOL) 325 mg tablet  Self No No   Sig: Take 2 tablets (650 mg total) by mouth every 6 (six) hours   apixaban (Eliquis) 5 mg  Self No No   Sig: Take 1 tablet (5 mg total) by mouth 2 (two) times a day   chlordiazepoxide-clidinium (LIBRAX) 5-2.5 mg per capsule  Self No No   Sig: TAKE 1 CAPSULE BY MOUTH TWO TIMES DAILY AS NEEDED FOR INDIGESTION   chlorhexidine (PERIDEX) 0.12 % solution  Self No No   Sig: Apply 15 mL to the mouth or throat 2 (two) times a day   dexamethasone (DECADRON) 2 mg tablet  Self No No   Sig: One tab with breakfast (early in  day to minimize impact on sleep, with food for stomach protection) for 1-5 days for unrelenting migraine   diltiazem (CARDIZEM CD) 240 mg 24 hr capsule  Self No No   Sig: Take 1 capsule (240 mg total) by mouth daily   fexofenadine (ALLEGRA) 180 MG tablet  Self Yes No   Sig: Take 180 mg by mouth in the morning.   fluticasone-vilanterol (Breo Ellipta) 200-25 mcg/actuation inhaler  Self No No   Sig: Inhale 1 puff daily Rinse mouth after use.   furosemide (LASIX) 20 mg tablet   No No   Sig: Take 1 tablet (20 mg total) by mouth every other day for 3 doses   Patient not taking: Reported on 5/19/2025   furosemide (LASIX) 40 mg tablet  Self No No   Sig: Take 1 tablet (40 mg total) by mouth daily   glucose blood (OneTouch Verio) test strip  Self No No   Sig: May substitute brand based on insurance coverage. Check glucose BID.   magnesium Oxide (MAG-OX) 400 mg TABS  Self No No   Sig: Take 1 tablet (400 mg total) by mouth daily at bedtime   Patient not taking: Reported on 5/5/2025   methocarbamol (ROBAXIN) 500 mg tablet  Self No No   Sig: TAKE TWO TABLETS BY MOUTH THREE TIMES A DAY   metoprolol succinate (TOPROL-XL) 100 mg 24 hr tablet  Self No No   Sig: Take 1 tablet (100 mg total) by mouth 2 (two) times a day   naloxone (NARCAN) 4 mg/0.1 mL nasal spray  Self No No   Sig: Administer 1 spray into a nostril. If no response after 2-3 minutes, give another dose in the other nostril using a new spray.   Patient not taking: Reported on 5/19/2025   omeprazole (PriLOSEC) 40 MG capsule  Self No No   Sig: Take 1 capsule (40 mg total) by mouth daily   ondansetron (ZOFRAN-ODT) 4 mg disintegrating tablet  Self No No   Sig: Take 1 tablet (4 mg total) by mouth every 8 (eight) hours as needed for nausea   polyethylene glycol (Golytely) 4000 mL solution   No No   Sig: Take 4,000 mL by mouth once for 1 dose Take 4000 mL by mouth once for 1 dose. Use as directed   Patient not taking: Reported on 5/19/2025   polyethylene glycol (MIRALAX) 17 g  packet  Self No No   Sig: Take 17 g by mouth 2 (two) times a day   polyethylene glycol (MiraLax) 17 GM/SCOOP powder   No No   Sig: Take 238 g by mouth once for 1 dose Take 238 g my mouth. Use as directed   Patient not taking: Reported on 5/19/2025   promethazine (PHENERGAN) 25 mg tablet  Self No No   Sig: TAKE 1 TABLET BY MOUTH EVERY 6 HOURS AS NEEDED FOR NAUSEA AND/OR VOMITING   Patient not taking: Reported on 5/19/2025   sitaGLIPtin (JANUVIA) 25 mg tablet  Self No No   Sig: Take 1 tablet (25 mg total) by mouth daily Do not start before May 7, 2025.   Patient not taking: Reported on 5/19/2025   spironolactone (ALDACTONE) 25 mg tablet  Self No No   Sig: Take 1 tablet (25 mg total) by mouth 2 (two) times a day   sucralfate (CARAFATE) 1 g tablet  Self No No   Sig: Take 1 tablet (1 g total) by mouth 4 (four) times a day   traMADol (ULTRAM) 50 mg tablet  Self No No   Sig: Take 2 tablets (100 mg total) by mouth every 8 (eight) hours as needed for moderate pain   triamcinolone (KENALOG) 0.1 % ointment  Self No No   Sig: Apply topically 2 (two) times a day START applying triamcinolone 0.1% ointment to the affected area twice daily for up to 2 weeks for flares. Do not use for more than 2 weeks.      Facility-Administered Medications Last Administration Doses Remaining   cyanocobalamin injection 1,000 mcg 3/28/2025  1:25 PM         Ace inhibitors, Benicar [olmesartan], Hydralazine, Other, Valsartan, Ampicillin, Keflex [cephalexin], Sulfa antibiotics, Motrin [ibuprofen], and Wound dressing adhesive    Objective :  Temp:  [98.5 °F (36.9 °C)] 98.5 °F (36.9 °C)  HR:  [65-89] 89  BP: ()/(45-71) 126/57  Resp:  [15-21] 18  SpO2:  [94 %-98 %] 95 %  O2 Device: None (Room air)  Nasal Cannula O2 Flow Rate (L/min):  [2 L/min] 2 L/min      Physical Exam  Vitals and nursing note reviewed.   Constitutional:       General: She is not in acute distress.     Appearance: Normal appearance. She is not ill-appearing.   HENT:      Head:  "Normocephalic and atraumatic.   Pulmonary:      Effort: Pulmonary effort is normal.   Abdominal:      General: Abdomen is flat. There is no distension.      Tenderness: There is no abdominal tenderness.      Comments: Midline incision clean dry and intact, staples removed     Skin:     General: Skin is warm.     Neurological:      Mental Status: She is oriented to person, place, and time.         Lab Results: I have reviewed the following results:  Recent Labs     05/22/25  0635 05/22/25  0711 05/22/25  0842   WBC 14.00*  --   --    HGB 10.5*  --   --    HCT 32.7*  --   --      --   --    SODIUM 138  --   --    K 3.2*  --   --      --   --    CO2 28  --   --    BUN 14  --   --    CREATININE 0.68  --   --    GLUC 139  --   --    AST 9*  --   --    ALT 7  --   --    ALB 3.6  --   --    TBILI 0.84  --   --    ALKPHOS 67  --   --    PTT 40*  --   --    INR 1.64*  --   --    HSTNI0 16  --   --    HSTNI2  --   --  15   *  --   --    LACTICACID  --  1.1  --        Imaging Results Review: I reviewed radiology reports from this admission including: CT abdomen/pelvis.  Other Study Results Review: No additional pertinent studies reviewed.    VTE Pharmacologic Prophylaxis: VTE covered by:    None     VTE Mechanical Prophylaxis: sequential compression device         [1]   Past Medical History:  Diagnosis Date    A-fib ([facility]) 03/2020    Allergic     Anxiety     Arthritis     Asthma     Back pain     and muscle pain    Bruises easily     Chronic pain disorder     Interstitial pain    Colon polyp     Dental bridge present     Depression     Eczema     GERD (gastroesophageal reflux disease)     Heart murmur     History of blood clots     per pt \"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\"    History of pneumonia     Hives     Hyperlipidemia     Hypertension     Infertility, female     Interstitial cystitis     Migraine     sees neurologist    Motion sickness     Obesity     Palpitations     PONV " "(postoperative nausea and vomiting)     Premature atrial contractions 11/09/2022    Psychiatric disorder     Sleep apnea     TIA (transient ischemic attack) 09/2022    per pt --\"had MRI and 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\"    Urinary incontinence     wears Pads    Venous insufficiency 08/01/2017    Wears glasses    [2]   Past Surgical History:  Procedure Laterality Date    COLONOSCOPY      DILATION AND CURETTAGE OF UTERUS      EGD      EXPLORATORY LAPAROTOMY      for infertility    EXPLORATORY LAPAROTOMY W/ BOWEL RESECTION N/A 7/19/2023    Procedure: LAPAROTOMY EXPLORATORY W/ BOWEL RESECTION;  Surgeon: Crista Recinos MD;  Location: [facility];  Service: General    HAND SURGERY      Hand excision of tendon cyst    VT LAPAROSCOPIC APPENDECTOMY N/A 05/03/2022    Procedure: APPENDECTOMY LAPAROSCOPIC;  Surgeon: Vin Fields MD;  Location: [facility];  Service: General    VT LAPS ABD PRTM&OMENTUM DX W/WO SPEC BR/WA SPX N/A 7/19/2023    Procedure: LAPAROSCOPY DIAGNOSTIC, LYSIS OF ADHESIONS, LAPAROSCOPY TAKE TAKEDOWN OF LEFT COLON, EXPLORATORY LAPAROSCOPY, LYSIS OF ADHESIONS, RESECTION OF TRANSVERSE COLON SPLENIC FLEXURE AND DESCENDING COLON , TAKE DOWN OF SPLENIC FLEXURE, SIGMOIDOSCOPY, MOBILIZATION OF RIGHT COLON, PRIMARY ANASTOMOSIS EEA 29, TAP BLOCK;  Surgeon: Crista Recinos MD;  Location[facility];  Service: General    VT RPR AA HERNIA RECR > 10 CM REDUCIBLE N/A 5/5/2025    Procedure: REPAIR HERNIA INCISIONAL 20 cm, retro rectus repair with Phasic mesh.  Unilateral myofascial release external oblique component seperation. TAP block;  Surgeon: Jewel Rankin MD;  Location[facility];  Service: General    VT TCAT IV STENT CRV CRTD ART EMBOLIC PROTECJ Left 4/29/2024    Procedure: ANGIOPLASTY ARTERY CAROTID W/ STENT;  Surgeon: Roberto García DO;  Location[facility];  Service: Vascular    VT TEAEC W/PATCH GRF CAROTID VERTB SUBCLAV NECK INC Left 1/31/2023    Procedure: EXPLORATION OF LEFT CAROTID ARTERY; " ABORTED ENDARTERECTOMY ARTERY CAROTID;  Surgeon: Roberto García DO;  Location: [facility];  Service: Vascular    SUPERIOR MESTENTERIC ARTERY STENT      WISDOM TOOTH EXTRACTION     [3]   Social History  Tobacco Use    Smoking status: Former     Current packs/day: 0.00     Average packs/day: 0.5 packs/day for 10.0 years (5.0 ttl pk-yrs)     Types: Cigarettes     Start date:      Quit date: 2019     Years since quittin.3     Passive exposure: Past    Smokeless tobacco: Never   Vaping Use    Vaping status: Never Used   Substance and Sexual Activity    Alcohol use: Not Currently    Drug use: No    Sexual activity: Not Currently     Comment: defer   [4]   Family History  Problem Relation Name Age of Onset    Lung cancer Mother Catrachita 60    Brain cancer Mother Catrachita 60    Diabetes Father Rafael     Depression Father Rafael     Other Father Rafael         septic    Allergies Sister Kendra     Hashimoto's thyroiditis Sister Kendra     Abdominal aortic aneurysm Sister Maria T     Diabetes Sister Aarti     No Known Problems Sister Karen     No Known Problems Maternal Grandmother      No Known Problems Maternal Grandfather      No Known Problems Paternal Grandmother      No Known Problems Paternal Grandfather      Hodgkin's lymphoma Brother Rafael     No Known Problems Brother Gilbertoer     No Known Problems Maternal Aunt      Diabetes Other Grandparent     Breast cancer Neg Hx     [5]   Social History  Tobacco Use    Smoking status: Former     Current packs/day: 0.00     Average packs/day: 0.5 packs/day for 10.0 years (5.0 ttl pk-yrs)     Types: Cigarettes     Start date:      Quit date: 2019     Years since quittin.3     Passive exposure: Past    Smokeless tobacco: Never   Vaping Use    Vaping status: Never Used   Substance and Sexual Activity    Alcohol use: Not Currently    Drug use: No    Sexual activity: Not Currently     Comment: defer

## 2025-05-22 NOTE — ASSESSMENT & PLAN NOTE
65-year-old female status post ventral hernia repair 5/5, now presenting with fever at home feeling overall unwell    5/22 CT A/P: Postoperative changes in the anterior abdominal wall, with 13.5 cm seroma in the rectus sheath.  Afebrile, vital signs stable within normal limits on room air    JENNIFER drain output cloudy serous    WBC 14  Hemoglobin 10.5  Creatinine 0.68    Plan  -No inpatient admission required at this time, discharge home from the ED  -Follow-up with the general surgery office on Tuesday 5/27  -Maintain JENNIFER drain, monitor output and character daily  -Maintain abdominal binder  -Encourage ambulation/out of bed, 3 times daily at home  -Encourage incentive spirometer use at home

## 2025-05-22 NOTE — ED PROVIDER NOTES
Time reflects when diagnosis was documented in both MDM as applicable and the Disposition within this note       Time User Action Codes Description Comment    5/22/2025 11:51 AM Milka Claudio Add [S30.1XXA] Hematoma of rectus sheath, initial encounter     5/22/2025 11:52 AM González Milka Remove [S30.1XXA] Hematoma of rectus sheath, initial encounter     5/22/2025 11:52 AM Milka Claudio Add [S30.1XXA] Abdominal wall seroma, initial encounter     5/22/2025  3:50 PM Milka Claudio Add [R10.10] Upper abdominal pain     5/22/2025  3:50 PM Milka Claudio Add [R06.02] Shortness of breath           ED Disposition       ED Disposition   Discharge    Condition   Stable    Date/Time   Thu May 22, 2025  3:50 PM    Comment   Nancy Jones discharge to home/self care.                   Assessment & Plan       Medical Decision Making  Patient is a 65-year-old female presenting with upper abdominal pain for 3 days, shortness of breath, and fever yesterday.  She had incisional ventral hernia repair about 2.5 weeks ago.  Vitals are all within normal limits on arrival and she is in no acute distress.    DDx: PE, atypical ACS, CHF, postop complication, UTI, viral syndrome, asthma exacerbation.  Plan: CBC, CMP, lipase, troponin/EKG, BNP, coags, lactic/Pro-Faustino, viral testing, UA, CT PE study with abdomen.    Leukocytosis of 14 present however there are no other SIRS criteria.  Mild anemia appears to be at baseline.  BNP also mildly elevated at 184 however she does not appear fluid overloaded otherwise.  Initial troponin 16, delta -1 with nonischemic EKG so lower suspicion for atypical ACS.  Viral testing negative.  Lactic is negative, procal mildly elevated but may be reactive to surgery. No TALI or elevated LFTs.  UA without infection. CT without PE or other acute findings in the chest.  There are postoperative changes in the anterior abdominal wall with a 13.5 cm seroma in the rectus sheath. Surgery would like to hold off on  antibiotics at this time. Cleared her for discharge with outpatient follow up in 5 days, return to ED with persistent fevers, worsening pain/swelling/redness - see associated consult note.     I have discussed findings and plan for discharge with the patient/caregiver. Follow up with the appropriate providers including primary care physician was discussed. Return precautions discussed with patient/caregiver as outlined in AVS. Patient/caregiver verbally expressed understanding. Patient stable at time of discharge and ambulated out of the emergency department.     Amount and/or Complexity of Data Reviewed  Labs: ordered. Decision-making details documented in ED Course.  Radiology: ordered. Decision-making details documented in ED Course.    Risk  Prescription drug management.        ED Course as of 05/22/25 1619   Thu May 22, 2025   0656 Desatting to 91% on RA per RN. No home O2 requirement. Placed on 2LNC with improvement to 98%.   0723 WBC(!): 14.00  No other SIRS criteria present.   0723 Hemoglobin(!): 10.5  9.6 2 weeks ago, appears to be baseline for patient.   0745 BNP(!): 184   0745 hs TnI 0hr: 16  Will obtain delta.   0746 Procalcitonin(!): 1.18  +Leukocytosis. Normal vitals and lactic. No source of infection identified. Possibly reactive to recent surgery.   0907 CT pe study w abdomen pelvis w contrast  IMPRESSION:     No acute findings in the chest. No pulmonary embolus.     Postoperative changes in the anterior abdominal wall, with 13.5 cm seroma in the rectus sheath.     0911 Delta 2hr hsTnI: -1   0938 Message sent to surgery.   1047 Surgery will evaluate at bedside.   1200 Surgery resident evaluated patient. Okay for discharge, hold off on antibiotics. Follow up appt Tuesday with Dr. Rankin. Return to ED with persistent fevers.   1231 Patient still unable to urinate. Will give more fluids.   1451 Patient requesting morning dose of lasix.   1549 UA w Reflex to Microscopic w Reflex to  Culture(!)  Consistent with mild dehydration, no evidence of infection.       Medications   furosemide (LASIX) tablet 40 mg (40 mg Oral Not Given 5/22/25 1524)   sodium chloride 0.9 % bolus 500 mL (0 mL Intravenous Stopped 5/22/25 0825)   morphine injection 2 mg (2 mg Intravenous Given 5/22/25 0721)   iohexol (OMNIPAQUE) 350 MG/ML injection (SINGLE-DOSE) 100 mL (100 mL Intravenous Given 5/22/25 0807)   LORazepam (ATIVAN) injection 0.5 mg (0.5 mg Intravenous Given 5/22/25 1024)   sodium chloride 0.9 % bolus 500 mL (0 mL Intravenous Stopped 5/22/25 1524)   acetaminophen (Ofirmev) injection 1,000 mg (0 mg Intravenous Stopped 5/22/25 1320)       ED Risk Strat Scores                    No data recorded        SBIRT 20yo+      Flowsheet Row Most Recent Value   Initial Alcohol Screen: US AUDIT-C     1. How often do you have a drink containing alcohol? 0 Filed at: 05/22/2025 0636   2. How many drinks containing alcohol do you have on a typical day you are drinking?  0 Filed at: 05/22/2025 0636   3a. Male UNDER 65: How often do you have five or more drinks on one occasion? 0 Filed at: 05/22/2025 0636   3b. FEMALE Any Age, or MALE 65+: How often do you have 4 or more drinks on one occassion? 0 Filed at: 05/22/2025 0636   Audit-C Score 0 Filed at: 05/22/2025 0636   UMESH: How many times in the past year have you...    Used an illegal drug or used a prescription medication for non-medical reasons? Never Filed at: 05/22/2025 0636                            History of Present Illness       Chief Complaint   Patient presents with    Abdominal Pain     Hernia repair 5/5/25. Drain removed this past Monday. Increased pain and abd distend. 103F temp yesterday. SOB starting yesterday.       Past Medical History[1]   Past Surgical History[2]   Family History[3]   Social History[4]   E-Cigarette/Vaping    E-Cigarette Use Never User       E-Cigarette/Vaping Substances    Nicotine No     THC No     CBD No     Flavoring No     Other No      Unknown No       I have reviewed and agree with the history as documented.     Patient is a 65-year-old female with a history of hypertension, hyperlipidemia, migraine, GERD, asthma, TIA, interstitial cystitis presenting for evaluation.  She had an incisional ventral hernia repair on 5/5/2025.  She has had intermittent pain since but had the drain removed from the right side of her abdomen 3 days ago and has had worsening upper abdominal pain since drain removal.  She reports bloody, cloudy, odorous drainage from the left abdominal drain yesterday but this has cleared.  She was also seen by her home health nurse yesterday and was noted to have a fever of 102.  She has some shortness of breath as well but no chest pain or leg swelling.  No URI symptoms.  No urinary symptoms.      Abdominal Pain  Associated symptoms: fever, nausea and shortness of breath    Associated symptoms: no chest pain, no chills, no constipation, no cough, no diarrhea, no dysuria, no hematuria, no sore throat and no vomiting        Review of Systems   Constitutional:  Positive for fever. Negative for chills.   HENT:  Negative for congestion, ear pain and sore throat.    Eyes:  Negative for pain and visual disturbance.   Respiratory:  Positive for shortness of breath. Negative for cough.    Cardiovascular:  Negative for chest pain, palpitations and leg swelling.   Gastrointestinal:  Positive for abdominal pain and nausea. Negative for constipation, diarrhea and vomiting.   Genitourinary:  Negative for dysuria, flank pain, frequency and hematuria.   Musculoskeletal:  Negative for arthralgias and back pain.   Skin:  Negative for color change and rash.   Neurological:  Negative for seizures and syncope.   All other systems reviewed and are negative.          Objective       ED Triage Vitals [05/22/25 0630]   Temperature Pulse Blood Pressure Respirations SpO2 Patient Position - Orthostatic VS   98.5 °F (36.9 °C) 73 99/54 16 98 % Sitting      Temp  Source Heart Rate Source BP Location FiO2 (%) Pain Score    Oral Monitor Right arm -- 7      Vitals      Date and Time Temp Pulse SpO2 Resp BP Pain Score FACES Pain Rating User   05/22/25 1430 -- 89 95 % 18 126/57 -- -- NG   05/22/25 1230 -- 78 96 % 20 155/71 -- -- NG   05/22/25 1030 -- 65 94 % 21 117/58 -- -- NG   05/22/25 0830 -- 68 98 % 18 107/51 -- -- NG   05/22/25 0822 -- -- -- -- -- 4 -- NG   05/22/25 0730 -- 68 96 % 15 102/45 -- -- NG   05/22/25 0721 -- -- -- -- -- 7 -- NG   05/22/25 0630 98.5 °F (36.9 °C) 73 98 % 16 99/54 7 -- EG            Physical Exam  Vitals and nursing note reviewed.   Constitutional:       General: She is not in acute distress.     Appearance: Normal appearance. She is not toxic-appearing.   HENT:      Head: Normocephalic and atraumatic.      Right Ear: External ear normal.      Left Ear: External ear normal.      Nose: Nose normal.      Mouth/Throat:      Mouth: Mucous membranes are moist.     Eyes:      General: No scleral icterus.        Right eye: No discharge.         Left eye: No discharge.      Extraocular Movements: Extraocular movements intact.       Cardiovascular:      Rate and Rhythm: Normal rate and regular rhythm.      Pulses: Normal pulses.      Heart sounds: Normal heart sounds.   Pulmonary:      Effort: Pulmonary effort is normal. No tachypnea or respiratory distress.      Breath sounds: Normal breath sounds. No wheezing.   Abdominal:      Palpations: Abdomen is soft.      Tenderness: There is abdominal tenderness in the right upper quadrant, epigastric area and left upper quadrant.      Comments: Vertical healing incision midline abdomen. No surrounding erythema, drainage. No dehiscence. Drain present left side of abdomen with yellow/orange output.     Musculoskeletal:         General: No tenderness, deformity or signs of injury.      Cervical back: Normal range of motion and neck supple.     Skin:     General: Skin is dry.      Coloration: Skin is not jaundiced.       Findings: No erythema or rash.     Neurological:      General: No focal deficit present.      Mental Status: She is alert and oriented to person, place, and time. Mental status is at baseline.      Motor: No weakness.      Gait: Gait normal.     Psychiatric:         Mood and Affect: Mood normal.         Behavior: Behavior normal.         Thought Content: Thought content normal.         Results Reviewed       Procedure Component Value Units Date/Time    UA w Reflex to Microscopic w Reflex to Culture [[id number]]  (Abnormal) Collected: 05/22/25 1516    Lab Status: Final result Specimen: Urine, Indwelling Fenton Catheter Updated: 05/22/25 1548     Color, UA Yellow     Clarity, UA Clear     Specific Gravity, UA >=1.050     pH, UA 6.5     Leukocytes, UA Negative     Nitrite, UA Negative     Protein, UA Trace mg/dl      Glucose, UA Negative mg/dl      Ketones, UA Trace mg/dl      Urobilinogen, UA <2.0 mg/dl      Bilirubin, UA Negative     Occult Blood, UA Negative    Urine Microscopic [[id number]] Collected: 05/22/25 1516    Lab Status: In process Specimen: Urine, Indwelling Fenton Catheter Updated: 05/22/25 1548    HS Troponin I 2hr [[id number]]  (Normal) Collected: 05/22/25 0842    Lab Status: Final result Specimen: Blood from Arm, Left Updated: 05/22/25 0910     hs TnI 2hr 15 ng/L      Delta 2hr hsTnI -1 ng/L     FLU/RSV/COVID - if FLU/RSV clinically relevant [[id number]]  (Normal) Collected: 05/22/25 0711    Lab Status: Final result Specimen: Nares from Nasopharyngeal Swab Updated: 05/22/25 0839     SARS-CoV-2 Negative     INFLUENZA A PCR Negative     INFLUENZA B PCR Negative     RSV PCR Negative    Narrative:      This test has been performed using the CoV-2/Flu/RSV plus assay on the [employer] GeneXpert platform. This test has been validated by the  and verified by the performing laboratory.     This test is designed to amplify and detect the following: nucleocapsid (N), envelope (E), and RNA-dependent RNA  polymerase (RdRP) genes of the SARS-CoV-2 genome; matrix (M), basic polymerase (PB2), and acidic protein (PA) segments of the influenza A genome; matrix (M) and non-structural protein (NS) segments of the influenza B genome, and the nucleocapsid genes of RSV A and RSV B.     Positive results are indicative of the presence of Flu A, Flu B, RSV, and/or SARS-CoV-2 RNA. Positive results for SARS-CoV-2 or suspected novel influenza should be reported to state, local, or federal health departments according to local reporting requirements.      All results should be assessed in conjunction with clinical presentation and other laboratory markers for clinical management.     FOR PEDIATRIC PATIENTS - copy/paste COVID Guidelines URL to browser: [email or web address]       Protime-INR [[id number]]  (Abnormal) Collected: 05/22/25 0635    Lab Status: Final result Specimen: Blood from Arm, Right Updated: 05/22/25 0753     Protime 19.4 seconds      INR 1.64    Narrative:      INR Therapeutic Range    Indication                                             INR Range      Atrial Fibrillation                                               2.0-3.0  Hypercoagulable State                                    2.0.2.3  Left Ventricular Asist Device                            2.0-3.0  Mechanical Heart Valve                                  -    Aortic(with afib, MI, embolism, HF, LA enlargement,    and/or coagulopathy)                                     2.0-3.0 (2.5-3.5)     Mitral                                                             2.5-3.5  Prosthetic/Bioprosthetic Heart Valve               2.0-3.0  Venous thromboembolism (VTE: VT, PE        2.0-3.0    APTT [[id number]]  (Abnormal) Collected: 05/22/25 0635    Lab Status: Final result Specimen: Blood from Arm, Right Updated: 05/22/25 0753     PTT 40 seconds     Procalcitonin [[id number]]  (Abnormal) Collected: 05/22/25 0635    Lab  Status: Final result Specimen: Blood from Arm, Right Updated: 05/22/25 0746     Procalcitonin 1.18 ng/ml     HS Troponin 0hr (reflex protocol) [[id number]]  (Normal) Collected: 05/22/25 0635    Lab Status: Final result Specimen: Blood from Arm, Right Updated: 05/22/25 0744     hs TnI 0hr 16 ng/L     B-Type Natriuretic Peptide(BNP) [[id number]]  (Abnormal) Collected: 05/22/25 0635    Lab Status: Final result Specimen: Blood from Arm, Right Updated: 05/22/25 0743      pg/mL     Lactic acid [[id number]]  (Normal) Collected: 05/22/25 0711    Lab Status: Final result Specimen: Blood from Arm, Right Updated: 05/22/25 0740     LACTIC ACID 1.1 mmol/L     Narrative:      Result may be elevated if tourniquet was used during collection.    Comprehensive metabolic panel [[id number]]  (Abnormal) Collected: 05/22/25 0635    Lab Status: Final result Specimen: Blood from Arm, Right Updated: 05/22/25 0740     Sodium 138 mmol/L      Potassium 3.2 mmol/L      Chloride 101 mmol/L      CO2 28 mmol/L      ANION GAP 9 mmol/L      BUN 14 mg/dL      Creatinine 0.68 mg/dL      Glucose 139 mg/dL      Calcium 8.8 mg/dL      AST 9 U/L      ALT 7 U/L      Alkaline Phosphatase 67 U/L      Total Protein 6.1 g/dL      Albumin 3.6 g/dL      Total Bilirubin 0.84 mg/dL      eGFR 90 ml/min/1.73sq m     Narrative:      National Kidney Disease Foundation guidelines for Chronic Kidney Disease (CKD):     Stage 1 with normal or high GFR (GFR > 90 mL/min/1.73 square meters)    Stage 2 Mild CKD (GFR = 60-89 mL/min/1.73 square meters)    Stage 3A Moderate CKD (GFR = 45-59 mL/min/1.73 square meters)    Stage 3B Moderate CKD (GFR = 30-44 mL/min/1.73 square meters)    Stage 4 Severe CKD (GFR = 15-29 mL/min/1.73 square meters)    Stage 5 End Stage CKD (GFR <15 mL/min/1.73 square meters)  Note: GFR calculation is accurate only with a steady state creatinine    Lipase [[id number]]  (Abnormal) Collected: 05/22/25 0636    Lab Status: Final result Specimen: Blood  from Arm, Right Updated: 05/22/25 0740     Lipase 6 u/L     CBC and differential [[id number]]  (Abnormal) Collected: 05/22/25 0635    Lab Status: Final result Specimen: Blood from Arm, Right Updated: 05/22/25 0722     WBC 14.00 Thousand/uL      RBC 3.50 Million/uL      Hemoglobin 10.5 g/dL      Hematocrit 32.7 %      MCV 93 fL      MCH 30.0 pg      MCHC 32.1 g/dL      RDW 13.7 %      MPV 9.5 fL      Platelets 266 Thousands/uL      nRBC 0 /100 WBCs      Segmented % 82 %      Immature Grans % 0 %      Lymphocytes % 10 %      Monocytes % 7 %      Eosinophils Relative 0 %      Basophils Relative 1 %      Absolute Neutrophils 11.51 Thousands/µL      Absolute Immature Grans 0.06 Thousand/uL      Absolute Lymphocytes 1.33 Thousands/µL      Absolute Monocytes 0.99 Thousand/µL      Eosinophils Absolute 0.04 Thousand/µL      Basophils Absolute 0.07 Thousands/µL             CT pe study w abdomen pelvis w contrast   Final Interpretation by Julian Hoskins MD (05/22 0905)      No acute findings in the chest. No pulmonary embolus.      Postoperative changes in the anterior abdominal wall, with 13.5 cm seroma in the rectus sheath.                        Workstation performed: [id number]             ECG 12 Lead Documentation Only    Date/Time: 5/22/2025 11:19 AM    Performed by: Milka Claudio PA-C  Authorized by: Milka Claudio PA-C    Indications / Diagnosis:  SOB  ECG reviewed by me, the ED Provider: yes    Patient location:  ED  Interpretation:     Interpretation: normal    Rate:     ECG rate:  68    ECG rate assessment: normal    Rhythm:     Rhythm: sinus rhythm    Ectopy:     Ectopy: none    QRS:     QRS axis:  Left    QRS intervals:  Normal  Conduction:     Conduction: normal    ST segments:     ST segments:  Normal  T waves:     T waves: normal    Other findings:     Other findings: LVH        ED Medication and Procedure Management   Prior to Admission Medications   Prescriptions Last Dose  Informant Patient Reported? Taking?   Alcohol Swabs 70 % PADS  Self No No   Sig: May substitute brand based on insurance coverage. Check glucose BID.   Blood Glucose Monitoring Suppl (OneTouch Verio Reflect) w/Device KIT  Self No No   Sig: May substitute brand based on insurance coverage. Check glucose BID.   Diclofenac Sodium (VOLTAREN) 1 %  Self No No   Sig: Apply 2 g topically 4 (four) times a day   Patient not taking: Reported on 5/19/2025   Docusate Sodium (COLACE PO)  Self Yes No   Sig: Take by mouth daily at bedtime   Elastic Bandages & Supports (Medical Compression Socks) MISC  Self No No   Sig: Use in the morning   Evolocumab (Repatha SureClick) 140 MG/ML SOAJ  Self No No   Sig: Inject 1 mL (140 mg total) under the skin every 14 (fourteen) days   LORazepam (ATIVAN) 2 mg tablet  Self No No   Sig: Take 1 tablet (2 mg total) by mouth every 8 (eight) hours as needed for anxiety   Meth-Hyo-M Bl-Na Phos-Ph Sal (Uribel) 118 MG CAPS  Self No No   Sig: Take 1 capsule (118 mg total) by mouth 3 (three) times a day   Patient not taking: Reported on 4/23/2025   OneTouch Delica Lancets 33G MISC  Self No No   Sig: May substitute brand based on insurance coverage. Check glucose BID.   acetaminophen (TYLENOL) 325 mg tablet  Self No No   Sig: Take 2 tablets (650 mg total) by mouth every 6 (six) hours   apixaban (Eliquis) 5 mg  Self No No   Sig: Take 1 tablet (5 mg total) by mouth 2 (two) times a day   chlordiazepoxide-clidinium (LIBRAX) 5-2.5 mg per capsule  Self No No   Sig: TAKE 1 CAPSULE BY MOUTH TWO TIMES DAILY AS NEEDED FOR INDIGESTION   chlorhexidine (PERIDEX) 0.12 % solution  Self No No   Sig: Apply 15 mL to the mouth or throat 2 (two) times a day   dexamethasone (DECADRON) 2 mg tablet  Self No No   Sig: One tab with breakfast (early in day to minimize impact on sleep, with food for stomach protection) for 1-5 days for unrelenting migraine   diltiazem (CARDIZEM CD) 240 mg 24 hr capsule  Self No No   Sig: Take 1  capsule (240 mg total) by mouth daily   fexofenadine (ALLEGRA) 180 MG tablet  Self Yes No   Sig: Take 180 mg by mouth in the morning.   fluticasone-vilanterol (Breo Ellipta) 200-25 mcg/actuation inhaler  Self No No   Sig: Inhale 1 puff daily Rinse mouth after use.   furosemide (LASIX) 20 mg tablet   No No   Sig: Take 1 tablet (20 mg total) by mouth every other day for 3 doses   Patient not taking: Reported on 5/19/2025   furosemide (LASIX) 40 mg tablet  Self No No   Sig: Take 1 tablet (40 mg total) by mouth daily   glucose blood (OneTouch Verio) test strip  Self No No   Sig: May substitute brand based on insurance coverage. Check glucose BID.   magnesium Oxide (MAG-OX) 400 mg TABS  Self No No   Sig: Take 1 tablet (400 mg total) by mouth daily at bedtime   Patient not taking: Reported on 5/5/2025   methocarbamol (ROBAXIN) 500 mg tablet  Self No No   Sig: TAKE TWO TABLETS BY MOUTH THREE TIMES A DAY   metoprolol succinate (TOPROL-XL) 100 mg 24 hr tablet  Self No No   Sig: Take 1 tablet (100 mg total) by mouth 2 (two) times a day   naloxone (NARCAN) 4 mg/0.1 mL nasal spray  Self No No   Sig: Administer 1 spray into a nostril. If no response after 2-3 minutes, give another dose in the other nostril using a new spray.   Patient not taking: Reported on 5/19/2025   omeprazole (PriLOSEC) 40 MG capsule  Self No No   Sig: Take 1 capsule (40 mg total) by mouth daily   ondansetron (ZOFRAN-ODT) 4 mg disintegrating tablet  Self No No   Sig: Take 1 tablet (4 mg total) by mouth every 8 (eight) hours as needed for nausea   polyethylene glycol (Golytely) 4000 mL solution   No No   Sig: Take 4,000 mL by mouth once for 1 dose Take 4000 mL by mouth once for 1 dose. Use as directed   Patient not taking: Reported on 5/19/2025   polyethylene glycol (MIRALAX) 17 g packet  Self No No   Sig: Take 17 g by mouth 2 (two) times a day   polyethylene glycol (MiraLax) 17 GM/SCOOP powder   No No   Sig: Take 238 g by mouth once for 1 dose Take 238 g my  mouth. Use as directed   Patient not taking: Reported on 5/19/2025   promethazine (PHENERGAN) 25 mg tablet  Self No No   Sig: TAKE 1 TABLET BY MOUTH EVERY 6 HOURS AS NEEDED FOR NAUSEA AND/OR VOMITING   Patient not taking: Reported on 5/19/2025   sitaGLIPtin (JANUVIA) 25 mg tablet  Self No No   Sig: Take 1 tablet (25 mg total) by mouth daily Do not start before May 7, 2025.   Patient not taking: Reported on 5/19/2025   spironolactone (ALDACTONE) 25 mg tablet  Self No No   Sig: Take 1 tablet (25 mg total) by mouth 2 (two) times a day   sucralfate (CARAFATE) 1 g tablet  Self No No   Sig: Take 1 tablet (1 g total) by mouth 4 (four) times a day   traMADol (ULTRAM) 50 mg tablet  Self No No   Sig: Take 2 tablets (100 mg total) by mouth every 8 (eight) hours as needed for moderate pain   triamcinolone (KENALOG) 0.1 % ointment  Self No No   Sig: Apply topically 2 (two) times a day START applying triamcinolone 0.1% ointment to the affected area twice daily for up to 2 weeks for flares. Do not use for more than 2 weeks.      Facility-Administered Medications Last Administration Doses Remaining   cyanocobalamin injection 1,000 mcg 3/28/2025  1:25 PM         Patient's Medications   Discharge Prescriptions    No medications on file     No discharge procedures on file.  ED SEPSIS DOCUMENTATION   Time reflects when diagnosis was documented in both MDM as applicable and the Disposition within this note       Time User Action Codes Description Comment    5/22/2025 11:51 AM Milka Claudio Add [S30.1XXA] Hematoma of rectus sheath, initial encounter     5/22/2025 11:52 AM Milka Claudio Remove [S30.1XXA] Hematoma of rectus sheath, initial encounter     5/22/2025 11:52 AM Milka Claudio Add [S30.1XXA] Abdominal wall seroma, initial encounter     5/22/2025  3:50 PM Milka Claudio Add [R10.10] Upper abdominal pain     5/22/2025  3:50 PM Milka Claudio Add [R06.02] Shortness of breath                    [1]   Past Medical  "History:  Diagnosis Date    A-fib ([facility]) 03/2020    Allergic     Anxiety     Arthritis     Asthma     Back pain     and muscle pain    Bruises easily     Chronic pain disorder     Interstitial pain    Colon polyp     Dental bridge present     Depression     Eczema     GERD (gastroesophageal reflux disease)     Heart murmur     History of blood clots     per pt \"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\"    History of pneumonia     Hives     Hyperlipidemia     Hypertension     Infertility, female     Interstitial cystitis     Migraine     sees neurologist    Motion sickness     Obesity     Palpitations     PONV (postoperative nausea and vomiting)     Premature atrial contractions 11/09/2022    Psychiatric disorder     Sleep apnea     TIA (transient ischemic attack) 09/2022    per pt --\"had MRI and 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\"    Urinary incontinence     wears Pads    Venous insufficiency 08/01/2017    Wears glasses    [2]   Past Surgical History:  Procedure Laterality Date    COLONOSCOPY      DILATION AND CURETTAGE OF UTERUS      EGD      EXPLORATORY LAPAROTOMY      for infertility    EXPLORATORY LAPAROTOMY W/ BOWEL RESECTION N/A 7/19/2023    Procedure: LAPAROTOMY EXPLORATORY W/ BOWEL RESECTION;  Surgeon: Crista Recinos MD;  [facility];  Service: General    HAND SURGERY      Hand excision of tendon cyst    MS LAPAROSCOPIC APPENDECTOMY N/A 05/03/2022    Procedure: APPENDECTOMY LAPAROSCOPIC;  Surgeon: Vin Fields MD;  [facility];  Service: General    MS LAPS ABD PRTM&OMENTUM DX W/WO SPEC BR/WA SPX N/A 7/19/2023    Procedure: LAPAROSCOPY DIAGNOSTIC, LYSIS OF ADHESIONS, LAPAROSCOPY TAKE TAKEDOWN OF LEFT COLON, EXPLORATORY LAPAROSCOPY, LYSIS OF ADHESIONS, RESECTION OF TRANSVERSE COLON SPLENIC FLEXURE AND DESCENDING COLON , TAKE DOWN OF SPLENIC FLEXURE, SIGMOIDOSCOPY, MOBILIZATION OF RIGHT COLON, PRIMARY ANASTOMOSIS EEA 29, TAP BLOCK;  Surgeon: Crista Recinos MD;  Location: " [facility];  Service: General    AZ RPR AA HERNIA RECR > 10 CM REDUCIBLE N/A 2025    Procedure: REPAIR HERNIA INCISIONAL 20 cm, retro rectus repair with Phasic mesh.  Unilateral myofascial release external oblique component seperation. TAP block;  Surgeon: Jewel Rankin MD;  [facility];  Service: General    AZ TCAT IV STENT CRV CRTD ART EMBOLIC PROTECJ Left 2024    Procedure: ANGIOPLASTY ARTERY CAROTID W/ STENT;  Surgeon: Roberto García DO;  [facility];  Service: Vascular    AZ TEAEC W/PATCH GRF CAROTID VERTB SUBCLAV NECK INC Left 2023    Procedure: EXPLORATION OF LEFT CAROTID ARTERY; ABORTED ENDARTERECTOMY ARTERY CAROTID;  Surgeon: Roberto García DO;  [facility];  Service: Vascular    SUPERIOR MESTENTERIC ARTERY STENT      WISDOM TOOTH EXTRACTION     [3]   Family History  Problem Relation Name Age of Onset    Lung cancer Mother Catrachita 60    Brain cancer Mother Catrachita 60    Diabetes Father Rafael     Depression Father Rafael     Other Father Rafael         septic    Allergies Sister Kendra     Hashimoto's thyroiditis Sister Kendra     Abdominal aortic aneurysm Sister Maria T     Diabetes Sister Aarti     No Known Problems Sister Karen     No Known Problems Maternal Grandmother      No Known Problems Maternal Grandfather      No Known Problems Paternal Grandmother      No Known Problems Paternal Grandfather      Hodgkin's lymphoma Brother Rafael     No Known Problems Brother Christopher     No Known Problems Maternal Aunt      Diabetes Other Grandparent     Breast cancer Neg Hx     [4]   Social History  Tobacco Use    Smoking status: Former     Current packs/day: 0.00     Average packs/day: 0.5 packs/day for 10.0 years (5.0 ttl pk-yrs)     Types: Cigarettes     Start date:      Quit date: 2019     Years since quittin.3     Passive exposure: Past    Smokeless tobacco: Never   Vaping Use    Vaping status: Never Used   Substance Use Topics    Alcohol use: Not  Currently    Drug use: No        Milka Claudio PA-C  05/22/25 3329

## 2025-05-22 NOTE — DISCHARGE INSTRUCTIONS
Follow up with general surgery on Tuesday as scheduled. Return to emergency department with worsening redness, swelling, pain of the abdomen, or persistent fevers.

## 2025-05-23 ENCOUNTER — HOME CARE VISIT (OUTPATIENT)
Dept: HOME HEALTH SERVICES | Facility: HOME HEALTHCARE | Age: 65
End: 2025-05-23
Payer: COMMERCIAL

## 2025-05-23 ENCOUNTER — VBI (OUTPATIENT)
Age: 65
End: 2025-05-23

## 2025-05-23 VITALS — OXYGEN SATURATION: 95 % | DIASTOLIC BLOOD PRESSURE: 62 MMHG | HEART RATE: 43 BPM | SYSTOLIC BLOOD PRESSURE: 136 MMHG

## 2025-05-23 VITALS
TEMPERATURE: 97.9 F | OXYGEN SATURATION: 96 % | SYSTOLIC BLOOD PRESSURE: 114 MMHG | HEART RATE: 64 BPM | DIASTOLIC BLOOD PRESSURE: 62 MMHG

## 2025-05-23 VITALS
TEMPERATURE: 97.6 F | SYSTOLIC BLOOD PRESSURE: 124 MMHG | DIASTOLIC BLOOD PRESSURE: 80 MMHG | HEART RATE: 68 BPM | OXYGEN SATURATION: 96 % | RESPIRATION RATE: 18 BRPM

## 2025-05-23 PROCEDURE — G0151 HHCP-SERV OF PT,EA 15 MIN: HCPCS

## 2025-05-23 PROCEDURE — G0299 HHS/HOSPICE OF RN EA 15 MIN: HCPCS

## 2025-05-23 PROCEDURE — G0152 HHCP-SERV OF OT,EA 15 MIN: HCPCS

## 2025-05-23 NOTE — TELEPHONE ENCOUNTER
05/23/25 8:36 AM    Patient contacted post ED visit, [facility] department spoke with patient/caregiver and outreach was successful.    Thank you.  Irma Rudd  [facility]

## 2025-05-25 DIAGNOSIS — I10 BENIGN ESSENTIAL HYPERTENSION: ICD-10-CM

## 2025-05-25 RX ORDER — SPIRONOLACTONE 25 MG/1
25 TABLET ORAL 2 TIMES DAILY
Qty: 180 TABLET | Refills: 0 | Status: SHIPPED | OUTPATIENT
Start: 2025-05-25

## 2025-05-27 ENCOUNTER — OFFICE VISIT (OUTPATIENT)
Dept: SURGERY | Facility: CLINIC | Age: 65
End: 2025-05-27
Payer: COMMERCIAL

## 2025-05-27 VITALS
HEIGHT: 64 IN | TEMPERATURE: 97.3 F | BODY MASS INDEX: 35.51 KG/M2 | DIASTOLIC BLOOD PRESSURE: 74 MMHG | WEIGHT: 208 LBS | HEART RATE: 71 BPM | SYSTOLIC BLOOD PRESSURE: 128 MMHG | OXYGEN SATURATION: 96 %

## 2025-05-27 DIAGNOSIS — Z09 POSTOP CHECK: Primary | ICD-10-CM

## 2025-05-27 PROCEDURE — 99212 OFFICE O/P EST SF 10 MIN: CPT | Performed by: PHYSICIAN ASSISTANT

## 2025-05-27 NOTE — PROGRESS NOTES
Assessment/Plan:   Nancy Joens is a 65 y.o.female who comes in today for postoperative check after REPAIR HERNIA INCISIONAL 22 cm. retrorectus repair with Phasix mesh.  Unilateral myofascial release external oblique component seperation. TAP block with Dr. Rankin on 05/05/2025.    Pathology: None sent    Patient is very pleased with the outcome of their surgery and overall doing better. Midline incision is well healed. She does continue to use abdominal binder. Left JENNIFER drain having an output of about 40cc but three days ago had increased output daily of serosanguinous fluid. With CT showing no fluid collection near drain site we removed left JENNIFER drain without issue. We did discuss the seroma within the rectus sheath. Return in about 2 weeks or PRN as a final check.       Postoperative restrictions reviewed, including specific lifting and exercise restrictions. All questions answered.     ______________________________________________________  HPI:  Nancy Jones is a 65 y.o.female who comes in today for postoperative check after recent  REPAIR HERNIA INCISIONAL 22 cm. retrorectus repair with Phasix mesh.  Unilateral myofascial release external oblique component seperation. TAP block with Dr. Rankin on 05/05/2025.    Currently doing well without problems, no fever or chills,no nausea and no vomiting. No chest pain or trouble breathing.     Reports she is doing better than last week. Will take her tramadol if the pain is more severe but overall has less pain. Midline incision still has staples. Right JENNIFER drain has less than 15ml daily the past few days. The left JENNIFER drain has an output of about 150ml daily of serosanguinous fluid with some clots at times. Normal bowel movements. Appetite back to her normal. Has been able to get around more and has PT coming a few times a week.     5/27/25. Patient getting about 40 cc of drainage the last two days so will remove the drain today. Pain level a 6/10 today but improving.  "Minimal appetite but is moving daily and taking naps as needed.    ROS:  General ROS: negative for - chills, fatigue, fever or night sweats, weight loss  Respiratory ROS: no cough, shortness of breath, or wheezing  Cardiovascular ROS: no chest pain or dyspnea on exertion  Genito-Urinary ROS: no dysuria, trouble voiding, or hematuria  Musculoskeletal ROS: negative for - gait disturbance, joint pain or muscle pain  Neurological ROS: no TIA or stroke symptoms  GI ROS: see HPI  Skin ROS: no new rashes or lesions   Lymphatic ROS: no new adenopathy noted by pt.   GYN ROS: see HPI, no new GYN history or bleeding noted  Psy ROS: no new mental or behavioral disturbances       Problem List[1]      Ace inhibitors, Benicar [olmesartan], Hydralazine, Other, Valsartan, Ampicillin, Keflex [cephalexin], Sulfa antibiotics, Motrin [ibuprofen], and Wound dressing adhesive    Current Medications[2]    Past Medical History:   Diagnosis Date    A-fib ([facility]) 03/2020    Allergic     Anxiety     Arthritis     Asthma     Back pain     and muscle pain    Bruises easily     Chronic pain disorder     Interstitial pain    Colon polyp     Dental bridge present     Depression     Eczema     GERD (gastroesophageal reflux disease)     Heart murmur     History of blood clots     per pt \"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\"    History of pneumonia     Hives     Hyperlipidemia     Hypertension     Infertility, female     Interstitial cystitis     Migraine     sees neurologist    Motion sickness     Obesity     Palpitations     PONV (postoperative nausea and vomiting)     Premature atrial contractions 11/09/2022    Psychiatric disorder     Sleep apnea     TIA (transient ischemic attack) 09/2022    per pt --\"had MRI and 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\"    Urinary incontinence     wears Pads    Venous insufficiency 08/01/2017    Wears glasses        Past Surgical History:   Procedure Laterality Date    COLONOSCOPY      DILATION AND " CURETTAGE OF UTERUS      EGD      EXPLORATORY LAPAROTOMY      for infertility    EXPLORATORY LAPAROTOMY W/ BOWEL RESECTION N/A 7/19/2023    Procedure: LAPAROTOMY EXPLORATORY W/ BOWEL RESECTION;  Surgeon: Crista Recinos MD;  [facility];  Service: General    HAND SURGERY      Hand excision of tendon cyst    CA LAPAROSCOPIC APPENDECTOMY N/A 05/03/2022    Procedure: APPENDECTOMY LAPAROSCOPIC;  Surgeon: Vin Fields MD;  [facility];  Service: General    CA LAPS ABD PRTM&OMENTUM DX W/WO SPEC BR/WA SPX N/A 7/19/2023    Procedure: LAPAROSCOPY DIAGNOSTIC, LYSIS OF ADHESIONS, LAPAROSCOPY TAKE TAKEDOWN OF LEFT COLON, EXPLORATORY LAPAROSCOPY, LYSIS OF ADHESIONS, RESECTION OF TRANSVERSE COLON SPLENIC FLEXURE AND DESCENDING COLON , TAKE DOWN OF SPLENIC FLEXURE, SIGMOIDOSCOPY, MOBILIZATION OF RIGHT COLON, PRIMARY ANASTOMOSIS EEA 29, TAP BLOCK;  Surgeon: Crista Recinos MD;  [facility];  Service: General    CA RPR AA HERNIA RECR > 10 CM REDUCIBLE N/A 5/5/2025    Procedure: REPAIR HERNIA INCISIONAL 20 cm, retro rectus repair with Phasic mesh.  Unilateral myofascial release external oblique component seperation. TAP block;  Surgeon: Jewel Rankin MD;  [facility];  Service: General    CA TCAT IV STENT CRV CRTD ART EMBOLIC PROTECJ Left 4/29/2024    Procedure: ANGIOPLASTY ARTERY CAROTID W/ STENT;  Surgeon: Roberto García DO;  [facility];  Service: Vascular    CA TEAEC W/PATCH GRF CAROTID VERTB SUBCLAV NECK INC Left 1/31/2023    Procedure: EXPLORATION OF LEFT CAROTID ARTERY; ABORTED ENDARTERECTOMY ARTERY CAROTID;  Surgeon: Roberto García DO;  [facility];  Service: Vascular    SUPERIOR MESTENTERIC ARTERY STENT      WISDOM TOOTH EXTRACTION         Family History   Problem Relation Name Age of Onset    Lung cancer Mother Catrachita 60    Brain cancer Mother Catrachita 60    Diabetes Father Rafael     Depression Father Rafael     Other Father Rafael         septic    Allergies  Sister Kendra     Hashimoto's thyroiditis Sister Kendra     Abdominal aortic aneurysm Sister Maria T     Diabetes Sister Aarti     No Known Problems Sister Karen     No Known Problems Maternal Grandmother      No Known Problems Maternal Grandfather      No Known Problems Paternal Grandmother      No Known Problems Paternal Grandfather      Hodgkin's lymphoma Brother Rafael     No Known Problems Brother Mckay     No Known Problems Maternal Aunt      Diabetes Other Grandparent     Breast cancer Neg Hx          reports that she quit smoking about 6 years ago. Her smoking use included cigarettes. She started smoking about 16 years ago. She has a 5 pack-year smoking history. She has been exposed to tobacco smoke. She has never used smokeless tobacco. She reports that she does not currently use alcohol. She reports that she does not use drugs.    Vitals:    05/27/25 0912   BP: 128/74   Pulse: 71   Temp: (!) 97.3 °F (36.3 °C)   SpO2: 96%       PHYSICAL EXAM  General: normal, cooperative, no distress  Abdominal: soft, nondistended, or nontender. Right JENNIFER drain removed. Covered with gauze and tape. Left JENNIFER drain intact and in place with serosanguinous fluid in bulb.  Incision: clean, dry, and intact and healing well. All staples removed.       Rolanda Owens PA-C    Date: 5/27/2025 Time: 10:56 AM           [1]   Patient Active Problem List  Diagnosis    Angina pectoris (HCC)    Essential hypertension    Migraines    AMD (age-related macular degeneration), bilateral    Allergic reaction    Gastroesophageal reflux disease without esophagitis    Angio-edema    A-fib (HCC)    Wheezing    Lumbar radiculopathy    CAMILLE (obstructive sleep apnea)    Hypertensive heart disease with congestive heart failure (HCC)    Unspecified diastolic (congestive) heart failure (HCC)    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HCC)    COPD (chronic obstructive pulmonary disease) (HCC)    Urinary retention    Peripheral vascular disease (HCC)     Appendix disease    Carotid artery stenosis    Stenosis of left carotid artery    Asymptomatic stenosis of left carotid artery    Hepatic steatosis    Partial facial nerve palsy    Injury of left facial nerve    Paresthesia    Colitis    Chronic migraine w/o aura w/o status migrainosus, not intractable    IIH (idiopathic intracranial hypertension)    Hematochezia    Left carotid stenosis    Colonic ischemia (HCC)    Acute postoperative pain    S/P small bowel resection    Anxiety    Omental infarction (HCC)    Hypokalemia    Night sweats    Dysuria    Stage 3 chronic kidney disease, unspecified whether stage 3a or 3b CKD ([facility])    Atherosclerosis of native arteries of extremities with rest pain, bilateral legs (HCC)    Acute on chronic diastolic (congestive) heart failure ([facility])    Left facial numbness    Other chest pain    Acute pain of left knee    Acute lateral meniscus tear of left knee    Opioid dependence, uncomplicated (HCC)    Obesity, morbid ([facility])    Complex tear of lateral meniscus of left knee as current injury, initial encounter    Folliculitis    Shingles    Diabetes due to undrl condition w oth diabetic neuro comp ([facility])    Financial difficulties    Rib contusion, right, subsequent encounter    Pharyngitis due to Streptococcus species    Abdominal wall seroma   [2]   Current Outpatient Medications:     acetaminophen (TYLENOL) 325 mg tablet, Take 2 tablets (650 mg total) by mouth every 6 (six) hours, Disp: , Rfl: 0    Alcohol Swabs 70 % PADS, May substitute brand based on insurance coverage. Check glucose BID., Disp: 100 each, Rfl: 0    apixaban (Eliquis) 5 mg, Take 1 tablet (5 mg total) by mouth 2 (two) times a day, Disp: 180 tablet, Rfl: 3    Blood Glucose Monitoring Suppl (OneTouch Verio Reflect) w/Device KIT, May substitute brand based on insurance coverage. Check glucose BID., Disp: 1 kit, Rfl: 0    chlordiazepoxide-clidinium (LIBRAX) 5-2.5 mg per capsule, TAKE 1 CAPSULE BY MOUTH TWO TIMES DAILY AS  NEEDED FOR INDIGESTION, Disp: 60 capsule, Rfl: 2    chlorhexidine (PERIDEX) 0.12 % solution, Apply 15 mL to the mouth or throat 2 (two) times a day, Disp: 120 mL, Rfl: 0    dexamethasone (DECADRON) 2 mg tablet, One tab with breakfast (early in day to minimize impact on sleep, with food for stomach protection) for 1-5 days for unrelenting migraine, Disp: 5 tablet, Rfl: 0    Diclofenac Sodium (VOLTAREN) 1 %, Apply 2 g topically 4 (four) times a day (Patient not taking: Reported on 5/19/2025), Disp: 100 g, Rfl: 1    diltiazem (CARDIZEM CD) 240 mg 24 hr capsule, Take 1 capsule (240 mg total) by mouth daily, Disp: 100 capsule, Rfl: 0    Docusate Sodium (COLACE PO), Take by mouth daily at bedtime, Disp: , Rfl:     Elastic Bandages & Supports (Medical Compression Socks) MISC, Use in the morning, Disp: 4 each, Rfl: 0    Evolocumab (Repatha SureClick) 140 MG/ML SOAJ, Inject 1 mL (140 mg total) under the skin every 14 (fourteen) days, Disp: 6 mL, Rfl: 5    fexofenadine (ALLEGRA) 180 MG tablet, Take 180 mg by mouth in the morning., Disp: , Rfl:     fluticasone-vilanterol (Breo Ellipta) 200-25 mcg/actuation inhaler, Inhale 1 puff daily Rinse mouth after use., Disp: 200 blister, Rfl: 1    furosemide (LASIX) 20 mg tablet, Take 1 tablet (20 mg total) by mouth every other day for 3 doses (Patient not taking: Reported on 5/19/2025), Disp: 3 tablet, Rfl: 0    furosemide (LASIX) 40 mg tablet, Take 1 tablet (40 mg total) by mouth daily, Disp: 90 tablet, Rfl: 1    glucose blood (OneTouch Verio) test strip, May substitute brand based on insurance coverage. Check glucose BID., Disp: 100 each, Rfl: 0    LORazepam (ATIVAN) 2 mg tablet, Take 1 tablet (2 mg total) by mouth every 8 (eight) hours as needed for anxiety, Disp: 90 tablet, Rfl: 5    magnesium Oxide (MAG-OX) 400 mg TABS, Take 1 tablet (400 mg total) by mouth daily at bedtime (Patient not taking: Reported on 5/5/2025), Disp: 90 tablet, Rfl: 4    Meth-Hyo-M Bl-Na Phos-Ph Sal (Uribel)  118 MG CAPS, Take 1 capsule (118 mg total) by mouth 3 (three) times a day (Patient not taking: Reported on 4/23/2025), Disp: 90 capsule, Rfl: 3    methocarbamol (ROBAXIN) 500 mg tablet, TAKE TWO TABLETS BY MOUTH THREE TIMES A DAY, Disp: 180 tablet, Rfl: 2    metoprolol succinate (TOPROL-XL) 100 mg 24 hr tablet, Take 1 tablet (100 mg total) by mouth 2 (two) times a day, Disp: 180 tablet, Rfl: 1    naloxone (NARCAN) 4 mg/0.1 mL nasal spray, Administer 1 spray into a nostril. If no response after 2-3 minutes, give another dose in the other nostril using a new spray. (Patient not taking: Reported on 5/19/2025), Disp: 1 each, Rfl: 1    omeprazole (PriLOSEC) 40 MG capsule, Take 1 capsule (40 mg total) by mouth daily, Disp: 90 capsule, Rfl: 1    ondansetron (ZOFRAN-ODT) 4 mg disintegrating tablet, Take 1 tablet (4 mg total) by mouth every 8 (eight) hours as needed for nausea, Disp: 21 tablet, Rfl: 5    OneTouch Delica Lancets 33G MISC, May substitute brand based on insurance coverage. Check glucose BID., Disp: 100 each, Rfl: 0    polyethylene glycol (Golytely) 4000 mL solution, Take 4,000 mL by mouth once for 1 dose Take 4000 mL by mouth once for 1 dose. Use as directed (Patient not taking: Reported on 5/19/2025), Disp: 4000 mL, Rfl: 0    polyethylene glycol (MIRALAX) 17 g packet, Take 17 g by mouth 2 (two) times a day, Disp: 1020 g, Rfl: 2    polyethylene glycol (MiraLax) 17 GM/SCOOP powder, Take 238 g by mouth once for 1 dose Take 238 g my mouth. Use as directed (Patient not taking: Reported on 5/19/2025), Disp: 238 g, Rfl: 0    promethazine (PHENERGAN) 25 mg tablet, TAKE 1 TABLET BY MOUTH EVERY 6 HOURS AS NEEDED FOR NAUSEA AND/OR VOMITING (Patient not taking: Reported on 5/19/2025), Disp: 120 tablet, Rfl: 1    sitaGLIPtin (JANUVIA) 25 mg tablet, Take 1 tablet (25 mg total) by mouth daily Do not start before May 7, 2025. (Patient not taking: Reported on 5/19/2025), Disp: 30 tablet, Rfl: 0    spironolactone (ALDACTONE) 25  mg tablet, TAKE 1 TABLET BY MOUTH TWO TIMES DAILY, Disp: 180 tablet, Rfl: 0    sucralfate (CARAFATE) 1 g tablet, Take 1 tablet (1 g total) by mouth 4 (four) times a day, Disp: 120 tablet, Rfl: 5    traMADol (ULTRAM) 50 mg tablet, Take 2 tablets (100 mg total) by mouth every 8 (eight) hours as needed for moderate pain, Disp: 180 tablet, Rfl: 5    triamcinolone (KENALOG) 0.1 % ointment, Apply topically 2 (two) times a day START applying triamcinolone 0.1% ointment to the affected area twice daily for up to 2 weeks for flares. Do not use for more than 2 weeks., Disp: 80 g, Rfl: 3    Current Facility-Administered Medications:     cyanocobalamin injection 1,000 mcg, 1,000 mcg, Intramuscular, Q30 Days, Coy Ayoub DO, 1,000 mcg at 03/28/25 0255

## 2025-05-29 ENCOUNTER — HOME CARE VISIT (OUTPATIENT)
Dept: HOME HEALTH SERVICES | Facility: HOME HEALTHCARE | Age: 65
End: 2025-05-29
Payer: COMMERCIAL

## 2025-05-29 VITALS
HEART RATE: 68 BPM | DIASTOLIC BLOOD PRESSURE: 72 MMHG | TEMPERATURE: 97.8 F | OXYGEN SATURATION: 96 % | SYSTOLIC BLOOD PRESSURE: 130 MMHG

## 2025-05-29 VITALS — HEART RATE: 69 BPM | SYSTOLIC BLOOD PRESSURE: 122 MMHG | DIASTOLIC BLOOD PRESSURE: 60 MMHG | OXYGEN SATURATION: 92 %

## 2025-05-29 PROCEDURE — G0299 HHS/HOSPICE OF RN EA 15 MIN: HCPCS

## 2025-05-29 PROCEDURE — G0152 HHCP-SERV OF OT,EA 15 MIN: HCPCS

## 2025-05-30 ENCOUNTER — OFFICE VISIT (OUTPATIENT)
Age: 65
End: 2025-05-30
Payer: COMMERCIAL

## 2025-05-30 VITALS
TEMPERATURE: 97.4 F | DIASTOLIC BLOOD PRESSURE: 82 MMHG | HEART RATE: 66 BPM | BODY MASS INDEX: 35 KG/M2 | WEIGHT: 205 LBS | HEIGHT: 64 IN | OXYGEN SATURATION: 96 % | SYSTOLIC BLOOD PRESSURE: 118 MMHG

## 2025-05-30 DIAGNOSIS — S30.1XXD ABDOMINAL WALL SEROMA, SUBSEQUENT ENCOUNTER: Primary | ICD-10-CM

## 2025-05-30 DIAGNOSIS — Z98.890 STATUS POST REPAIR OF VENTRAL HERNIA: ICD-10-CM

## 2025-05-30 DIAGNOSIS — I10 ESSENTIAL HYPERTENSION: ICD-10-CM

## 2025-05-30 DIAGNOSIS — Z87.19 STATUS POST REPAIR OF VENTRAL HERNIA: ICD-10-CM

## 2025-05-30 PROCEDURE — 99214 OFFICE O/P EST MOD 30 MIN: CPT | Performed by: FAMILY MEDICINE

## 2025-05-30 NOTE — ASSESSMENT & PLAN NOTE
Patient's ER records reviewed at this time.  Patient seems to be improving overall.  Follow-up with general surgery next week.  Patient will continue with tramadol as needed for pain

## 2025-05-30 NOTE — PROGRESS NOTES
"Name: Nancy Jones      : 1960      MRN: [id number]  Encounter Provider: Barber Ayoub DO  Encounter Date: 2025   Encounter department: [facility]  :  Assessment & Plan  Abdominal wall seroma, subsequent encounter  Patient's ER records reviewed at this time.  Patient seems to be improving overall.  Follow-up with general surgery next week.  Patient will continue with tramadol as needed for pain       Status post repair of ventral hernia  Tramadol as needed for pain.  Slowly improving JENNIFER drains out       Essential hypertension  Stable at this time.              History of Present Illness   Patient is here for follow-up on abdominal pain.  Patient is still having some abdominal pain.  Patient with recent ER visit for abdominal wall seroma.  Patient was having some abdominal pain shortness of breath fevers at that time.  CT of the chest was negative for PE.  Labs reviewed with elevated  hemoglobin 10.5 troponin level decreased.  JENNIFER drains have been removed.  Patient's white blood cell count was 14 at that time.  Patient did receive IV fluids.  Patient status post ventral hernia repair with Dr. Ferguson.  Patient does have follow-up next week.  No fevers or chills at this time.  No other chest pain or shortness of breath.      Review of Systems   Constitutional: Negative.    HENT: Negative.     Eyes: Negative.    Respiratory: Negative.     Cardiovascular: Negative.    Gastrointestinal:  Positive for abdominal pain.   Endocrine: Negative.    Genitourinary: Negative.    Musculoskeletal: Negative.    Skin: Negative.    Allergic/Immunologic: Negative.    Neurological: Negative.    Hematological: Negative.    Psychiatric/Behavioral: Negative.         Objective   /82 (BP Location: Left arm, Patient Position: Sitting, Cuff Size: Large)   Pulse 66   Temp (!) 97.4 °F (36.3 °C) (Temporal)   Ht 5' 4\" (1.626 m)   Wt 93 kg (205 lb)   LMP  (LMP Unknown)   SpO2 96%   BMI 35.19 " kg/m²      Physical Exam  Vitals and nursing note reviewed.   Constitutional:       General: She is not in acute distress.     Appearance: Normal appearance. She is well-developed. She is not ill-appearing, toxic-appearing or diaphoretic.   HENT:      Head: Normocephalic and atraumatic.      Right Ear: External ear normal.      Left Ear: External ear normal.      Nose: Nose normal.     Eyes:      General:         Right eye: No discharge.         Left eye: No discharge.     Neck:      Thyroid: No thyromegaly.      Trachea: No tracheal deviation.     Cardiovascular:      Rate and Rhythm: Normal rate and regular rhythm.      Pulses: Normal pulses.      Heart sounds: Normal heart sounds. No murmur heard.     No gallop.   Pulmonary:      Effort: Pulmonary effort is normal. No respiratory distress.      Breath sounds: Normal breath sounds. No stridor. No wheezing or rales.   Chest:      Chest wall: No tenderness.   Abdominal:      General: Bowel sounds are normal. There is no distension.      Palpations: Abdomen is soft.      Tenderness: There is abdominal tenderness. There is no guarding or rebound.      Comments: Mild discomfort in the right abdomen     Musculoskeletal:      Cervical back: Normal range of motion and neck supple.     Skin:     General: Skin is warm and dry.      Coloration: Skin is not pale.      Findings: No erythema or rash.     Neurological:      Mental Status: She is alert and oriented to person, place, and time.      Motor: No abnormal muscle tone.     Psychiatric:         Behavior: Behavior normal.         Thought Content: Thought content normal.         Judgment: Judgment normal.

## 2025-05-31 DIAGNOSIS — I10 BENIGN ESSENTIAL HYPERTENSION: ICD-10-CM

## 2025-05-31 DIAGNOSIS — I10 ESSENTIAL HYPERTENSION: ICD-10-CM

## 2025-06-01 RX ORDER — FUROSEMIDE 40 MG/1
40 TABLET ORAL DAILY
Qty: 90 TABLET | Refills: 1 | Status: SHIPPED | OUTPATIENT
Start: 2025-06-01

## 2025-06-01 RX ORDER — METOPROLOL SUCCINATE 100 MG/1
100 TABLET, EXTENDED RELEASE ORAL 2 TIMES DAILY
Qty: 180 TABLET | Refills: 1 | Status: SHIPPED | OUTPATIENT
Start: 2025-06-01

## 2025-06-02 DIAGNOSIS — M54.16 LUMBAR RADICULOPATHY: ICD-10-CM

## 2025-06-02 DIAGNOSIS — F41.9 ANXIETY: ICD-10-CM

## 2025-06-02 DIAGNOSIS — Z90.49 S/P SMALL BOWEL RESECTION: ICD-10-CM

## 2025-06-02 NOTE — TELEPHONE ENCOUNTER
Patient called in regards to wanting to know if medication was sent in as patient is out of medication.

## 2025-06-02 NOTE — TELEPHONE ENCOUNTER
Patient saw pcp on Friday and discussed increase of frequency to every 6 hours. Please update order and send to pharmacy.    Medication: traMADol (ULTRAM) 50 mg tablet     Dose/Frequency: 100 mg, Oral, Every 8 hours PRN (requested update to every 6 hours)    Quantity: Dispense: 180 tablet  Refills: 5 ordered    Pharmacy: [id number] - [place] - [address] [place] [address] [phone]    Office:   [x] PCP/Provider - Coy Ayoub, DO   [] Speciality/Provider -     Does the patient have enough for 3 days?   [] Yes   [x] No - Send as HP to POD

## 2025-06-03 RX ORDER — TRAMADOL HYDROCHLORIDE 50 MG/1
100 TABLET ORAL EVERY 6 HOURS PRN
Qty: 240 TABLET | Refills: 5 | Status: SHIPPED | OUTPATIENT
Start: 2025-06-03

## 2025-06-03 NOTE — TELEPHONE ENCOUNTER
Patient contacted the office this morning looking for an update on medication to be taken every 6 hours. Has no medication left at this time, did call the pharmacy could fill the every 8 hours one but hoping new prescription with updated instructions could be called in so she would not be running out earlier. Please contact patient back once prescription is called into pharmacy. Thank you.

## 2025-06-04 ENCOUNTER — HOME CARE VISIT (OUTPATIENT)
Dept: HOME HEALTH SERVICES | Facility: HOME HEALTHCARE | Age: 65
End: 2025-06-04
Payer: COMMERCIAL

## 2025-06-04 VITALS — SYSTOLIC BLOOD PRESSURE: 112 MMHG | HEART RATE: 72 BPM | DIASTOLIC BLOOD PRESSURE: 58 MMHG | OXYGEN SATURATION: 94 %

## 2025-06-04 DIAGNOSIS — J06.9 ACUTE UPPER RESPIRATORY INFECTION: ICD-10-CM

## 2025-06-04 DIAGNOSIS — R06.2 WHEEZING: ICD-10-CM

## 2025-06-04 PROCEDURE — G0152 HHCP-SERV OF OT,EA 15 MIN: HCPCS

## 2025-06-05 ENCOUNTER — TELEPHONE (OUTPATIENT)
Age: 65
End: 2025-06-05

## 2025-06-05 ENCOUNTER — HOME CARE VISIT (OUTPATIENT)
Dept: HOME HEALTH SERVICES | Facility: HOME HEALTHCARE | Age: 65
End: 2025-06-05
Payer: COMMERCIAL

## 2025-06-05 VITALS
SYSTOLIC BLOOD PRESSURE: 122 MMHG | DIASTOLIC BLOOD PRESSURE: 66 MMHG | HEART RATE: 62 BPM | OXYGEN SATURATION: 96 % | TEMPERATURE: 98.2 F

## 2025-06-05 DIAGNOSIS — R60.0 BILATERAL LEG EDEMA: ICD-10-CM

## 2025-06-05 DIAGNOSIS — R06.02 SHORTNESS OF BREATH: ICD-10-CM

## 2025-06-05 PROCEDURE — G0299 HHS/HOSPICE OF RN EA 15 MIN: HCPCS

## 2025-06-05 RX ORDER — FUROSEMIDE 20 MG/1
20 TABLET ORAL EVERY OTHER DAY
Qty: 3 TABLET | Refills: 0 | Status: SHIPPED | OUTPATIENT
Start: 2025-06-05 | End: 2025-06-10

## 2025-06-05 RX ORDER — FLUTICASONE FUROATE AND VILANTEROL 200; 25 UG/1; UG/1
1 POWDER RESPIRATORY (INHALATION) DAILY
Qty: 200 BLISTER | Refills: 0 | Status: SHIPPED | OUTPATIENT
Start: 2025-06-05

## 2025-06-05 NOTE — TELEPHONE ENCOUNTER
Please review request for the additional 20 mg of lasix. Order pended please sign if appropriate      Thank you

## 2025-06-05 NOTE — TELEPHONE ENCOUNTER
Received call from patient with c.o BLE edema   States she is on Lasix 40 mg daily.   She seen provider Dr. Pratt on 5/13 and was prescribed Lasix 20 mg for increased edema   She states she now out of the 20 mg tabs and requesting refill.     Patient denies any respiratory symptoms.   States she feels fine otherwise.     Patient has yet to  compressions stocking.   Reviewed the importance of keeping legs elevated and using compression stockings to help reduce swelling     Please advise.

## 2025-06-10 ENCOUNTER — APPOINTMENT (EMERGENCY)
Dept: CT IMAGING | Facility: HOSPITAL | Age: 65
End: 2025-06-10
Payer: COMMERCIAL

## 2025-06-10 ENCOUNTER — HOSPITAL ENCOUNTER (INPATIENT)
Facility: HOSPITAL | Age: 65
LOS: 3 days | Discharge: HOME HEALTH | End: 2025-06-13
Attending: SPECIALIST | Admitting: SPECIALIST
Payer: COMMERCIAL

## 2025-06-10 ENCOUNTER — OFFICE VISIT (OUTPATIENT)
Dept: SURGERY | Facility: CLINIC | Age: 65
End: 2025-06-10

## 2025-06-10 VITALS
HEIGHT: 64 IN | OXYGEN SATURATION: 96 % | DIASTOLIC BLOOD PRESSURE: 58 MMHG | BODY MASS INDEX: 35 KG/M2 | WEIGHT: 205 LBS | SYSTOLIC BLOOD PRESSURE: 112 MMHG | HEART RATE: 85 BPM | TEMPERATURE: 95.1 F

## 2025-06-10 DIAGNOSIS — S30.1XXD ABDOMINAL WALL SEROMA, SUBSEQUENT ENCOUNTER: ICD-10-CM

## 2025-06-10 DIAGNOSIS — L76.34 POSTOPERATIVE SEROMA OF SUBCUTANEOUS TISSUE AFTER NON-DERMATOLOGIC PROCEDURE: Primary | ICD-10-CM

## 2025-06-10 DIAGNOSIS — Z98.890 STATUS POST HERNIA REPAIR: Primary | ICD-10-CM

## 2025-06-10 DIAGNOSIS — Z87.19 STATUS POST HERNIA REPAIR: Primary | ICD-10-CM

## 2025-06-10 LAB
ALBUMIN SERPL BCG-MCNC: 4.4 G/DL (ref 3.5–5)
ALP SERPL-CCNC: 96 U/L (ref 34–104)
ALT SERPL W P-5'-P-CCNC: 7 U/L (ref 7–52)
ANION GAP SERPL CALCULATED.3IONS-SCNC: 13 MMOL/L (ref 4–13)
AST SERPL W P-5'-P-CCNC: 12 U/L (ref 13–39)
BASOPHILS # BLD AUTO: 0.06 THOUSANDS/ÂΜL (ref 0–0.1)
BASOPHILS NFR BLD AUTO: 1 % (ref 0–1)
BILIRUB SERPL-MCNC: 0.66 MG/DL (ref 0.2–1)
BUN SERPL-MCNC: 14 MG/DL (ref 5–25)
CALCIUM SERPL-MCNC: 9.8 MG/DL (ref 8.4–10.2)
CHLORIDE SERPL-SCNC: 96 MMOL/L (ref 96–108)
CO2 SERPL-SCNC: 26 MMOL/L (ref 21–32)
CREAT SERPL-MCNC: 1.09 MG/DL (ref 0.6–1.3)
EOSINOPHIL # BLD AUTO: 0.17 THOUSAND/ÂΜL (ref 0–0.61)
EOSINOPHIL NFR BLD AUTO: 2 % (ref 0–6)
ERYTHROCYTE [DISTWIDTH] IN BLOOD BY AUTOMATED COUNT: 12.9 % (ref 11.6–15.1)
GFR SERPL CREATININE-BSD FRML MDRD: 52 ML/MIN/1.73SQ M
GLUCOSE SERPL-MCNC: 130 MG/DL (ref 65–140)
HCT VFR BLD AUTO: 35.8 % (ref 34.8–46.1)
HGB BLD-MCNC: 11.5 G/DL (ref 11.5–15.4)
IMM GRANULOCYTES # BLD AUTO: 0.06 THOUSAND/UL (ref 0–0.2)
IMM GRANULOCYTES NFR BLD AUTO: 1 % (ref 0–2)
LACTATE SERPL-SCNC: 1 MMOL/L (ref 0.5–2)
LYMPHOCYTES # BLD AUTO: 1.19 THOUSANDS/ÂΜL (ref 0.6–4.47)
LYMPHOCYTES NFR BLD AUTO: 13 % (ref 14–44)
MCH RBC QN AUTO: 28.9 PG (ref 26.8–34.3)
MCHC RBC AUTO-ENTMCNC: 32.1 G/DL (ref 31.4–37.4)
MCV RBC AUTO: 90 FL (ref 82–98)
MONOCYTES # BLD AUTO: 1 THOUSAND/ÂΜL (ref 0.17–1.22)
MONOCYTES NFR BLD AUTO: 11 % (ref 4–12)
NEUTROPHILS # BLD AUTO: 6.82 THOUSANDS/ÂΜL (ref 1.85–7.62)
NEUTS SEG NFR BLD AUTO: 72 % (ref 43–75)
NRBC BLD AUTO-RTO: 0 /100 WBCS
PLATELET # BLD AUTO: 403 THOUSANDS/UL (ref 149–390)
PMV BLD AUTO: 8.6 FL (ref 8.9–12.7)
POTASSIUM SERPL-SCNC: 3.4 MMOL/L (ref 3.5–5.3)
PROT SERPL-MCNC: 7.5 G/DL (ref 6.4–8.4)
RBC # BLD AUTO: 3.98 MILLION/UL (ref 3.81–5.12)
SODIUM SERPL-SCNC: 135 MMOL/L (ref 135–147)
WBC # BLD AUTO: 9.3 THOUSAND/UL (ref 4.31–10.16)

## 2025-06-10 PROCEDURE — 83605 ASSAY OF LACTIC ACID: CPT

## 2025-06-10 PROCEDURE — 74177 CT ABD & PELVIS W/CONTRAST: CPT

## 2025-06-10 PROCEDURE — 36415 COLL VENOUS BLD VENIPUNCTURE: CPT

## 2025-06-10 PROCEDURE — 85025 COMPLETE CBC W/AUTO DIFF WBC: CPT

## 2025-06-10 PROCEDURE — 99024 POSTOP FOLLOW-UP VISIT: CPT | Performed by: SURGERY

## 2025-06-10 PROCEDURE — NC001 PR NO CHARGE: Performed by: SPECIALIST

## 2025-06-10 PROCEDURE — 99284 EMERGENCY DEPT VISIT MOD MDM: CPT

## 2025-06-10 PROCEDURE — 80053 COMPREHEN METABOLIC PANEL: CPT

## 2025-06-10 RX ORDER — FLUTICASONE FUROATE AND VILANTEROL 200; 25 UG/1; UG/1
1 POWDER RESPIRATORY (INHALATION) DAILY
Status: DISCONTINUED | OUTPATIENT
Start: 2025-06-11 | End: 2025-06-13 | Stop reason: HOSPADM

## 2025-06-10 RX ORDER — ACETAMINOPHEN 325 MG/1
650 TABLET ORAL EVERY 6 HOURS PRN
Status: DISCONTINUED | OUTPATIENT
Start: 2025-06-10 | End: 2025-06-13 | Stop reason: HOSPADM

## 2025-06-10 RX ORDER — DEXAMETHASONE 4 MG/1
2 TABLET ORAL DAILY
Status: DISCONTINUED | OUTPATIENT
Start: 2025-06-11 | End: 2025-06-13 | Stop reason: HOSPADM

## 2025-06-10 RX ORDER — FUROSEMIDE 40 MG/1
40 TABLET ORAL DAILY
Status: DISCONTINUED | OUTPATIENT
Start: 2025-06-11 | End: 2025-06-13 | Stop reason: HOSPADM

## 2025-06-10 RX ORDER — METHOCARBAMOL 500 MG/1
1000 TABLET, FILM COATED ORAL 3 TIMES DAILY
Status: DISCONTINUED | OUTPATIENT
Start: 2025-06-10 | End: 2025-06-13 | Stop reason: HOSPADM

## 2025-06-10 RX ORDER — TRAMADOL HYDROCHLORIDE 50 MG/1
100 TABLET ORAL EVERY 6 HOURS PRN
Status: DISCONTINUED | OUTPATIENT
Start: 2025-06-10 | End: 2025-06-11

## 2025-06-10 RX ORDER — ONDANSETRON 4 MG/1
4 TABLET, ORALLY DISINTEGRATING ORAL EVERY 8 HOURS PRN
Status: DISCONTINUED | OUTPATIENT
Start: 2025-06-10 | End: 2025-06-13 | Stop reason: HOSPADM

## 2025-06-10 RX ORDER — FUROSEMIDE 20 MG/1
20 TABLET ORAL EVERY OTHER DAY
Status: DISCONTINUED | OUTPATIENT
Start: 2025-06-11 | End: 2025-06-13 | Stop reason: HOSPADM

## 2025-06-10 RX ORDER — LORAZEPAM 1 MG/1
2 TABLET ORAL EVERY 8 HOURS PRN
Status: DISCONTINUED | OUTPATIENT
Start: 2025-06-10 | End: 2025-06-13 | Stop reason: HOSPADM

## 2025-06-10 RX ORDER — SODIUM CHLORIDE, SODIUM GLUCONATE, SODIUM ACETATE, POTASSIUM CHLORIDE, MAGNESIUM CHLORIDE, SODIUM PHOSPHATE, DIBASIC, AND POTASSIUM PHOSPHATE .53; .5; .37; .037; .03; .012; .00082 G/100ML; G/100ML; G/100ML; G/100ML; G/100ML; G/100ML; G/100ML
125 INJECTION, SOLUTION INTRAVENOUS CONTINUOUS
Status: DISCONTINUED | OUTPATIENT
Start: 2025-06-10 | End: 2025-06-11

## 2025-06-10 RX ORDER — ONDANSETRON 2 MG/ML
4 INJECTION INTRAMUSCULAR; INTRAVENOUS EVERY 6 HOURS PRN
Status: DISCONTINUED | OUTPATIENT
Start: 2025-06-10 | End: 2025-06-11

## 2025-06-10 RX ORDER — HEPARIN SODIUM 5000 [USP'U]/ML
5000 INJECTION, SOLUTION INTRAVENOUS; SUBCUTANEOUS EVERY 8 HOURS SCHEDULED
Status: DISCONTINUED | OUTPATIENT
Start: 2025-06-10 | End: 2025-06-12

## 2025-06-10 RX ORDER — PANTOPRAZOLE SODIUM 20 MG/1
20 TABLET, DELAYED RELEASE ORAL
Status: DISCONTINUED | OUTPATIENT
Start: 2025-06-11 | End: 2025-06-13 | Stop reason: HOSPADM

## 2025-06-10 RX ORDER — OXYCODONE HYDROCHLORIDE 5 MG/1
5 TABLET ORAL EVERY 4 HOURS PRN
Refills: 0 | Status: DISCONTINUED | OUTPATIENT
Start: 2025-06-10 | End: 2025-06-11

## 2025-06-10 RX ORDER — HYDROMORPHONE HCL IN WATER/PF 6 MG/30 ML
0.2 PATIENT CONTROLLED ANALGESIA SYRINGE INTRAVENOUS
Refills: 0 | Status: DISCONTINUED | OUTPATIENT
Start: 2025-06-10 | End: 2025-06-13 | Stop reason: HOSPADM

## 2025-06-10 RX ORDER — METOPROLOL SUCCINATE 100 MG/1
100 TABLET, EXTENDED RELEASE ORAL 2 TIMES DAILY
Status: DISCONTINUED | OUTPATIENT
Start: 2025-06-10 | End: 2025-06-13 | Stop reason: HOSPADM

## 2025-06-10 RX ORDER — SPIRONOLACTONE 25 MG/1
25 TABLET ORAL 2 TIMES DAILY
Status: DISCONTINUED | OUTPATIENT
Start: 2025-06-10 | End: 2025-06-13 | Stop reason: HOSPADM

## 2025-06-10 RX ADMIN — HEPARIN SODIUM 5000 UNITS: 5000 INJECTION INTRAVENOUS; SUBCUTANEOUS at 16:34

## 2025-06-10 RX ADMIN — HEPARIN SODIUM 5000 UNITS: 5000 INJECTION INTRAVENOUS; SUBCUTANEOUS at 21:50

## 2025-06-10 RX ADMIN — METOPROLOL SUCCINATE 100 MG: 100 TABLET, EXTENDED RELEASE ORAL at 21:40

## 2025-06-10 RX ADMIN — ACETAMINOPHEN 650 MG: 325 TABLET, FILM COATED ORAL at 21:52

## 2025-06-10 RX ADMIN — IOHEXOL 100 ML: 350 INJECTION, SOLUTION INTRAVENOUS at 15:47

## 2025-06-10 RX ADMIN — SPIRONOLACTONE 25 MG: 25 TABLET ORAL at 21:38

## 2025-06-10 RX ADMIN — ONDANSETRON 4 MG: 4 TABLET, ORALLY DISINTEGRATING ORAL at 21:39

## 2025-06-10 RX ADMIN — SODIUM CHLORIDE, SODIUM GLUCONATE, SODIUM ACETATE, POTASSIUM CHLORIDE, MAGNESIUM CHLORIDE, SODIUM PHOSPHATE, DIBASIC, AND POTASSIUM PHOSPHATE 125 ML/HR: .53; .5; .37; .037; .03; .012; .00082 INJECTION, SOLUTION INTRAVENOUS at 16:29

## 2025-06-10 RX ADMIN — LORAZEPAM 2 MG: 1 TABLET ORAL at 21:38

## 2025-06-10 RX ADMIN — METHOCARBAMOL 1000 MG: 500 TABLET ORAL at 21:38

## 2025-06-10 RX ADMIN — TRAMADOL HYDROCHLORIDE 100 MG: 50 TABLET, COATED ORAL at 21:43

## 2025-06-10 NOTE — PROGRESS NOTES
"Name: Nancy Jones      : 1960      MRN: [id number]  Encounter Provider: Jewel Rankin MD  Encounter Date: 6/10/2025   Encounter department: [facility]  :  Assessment & Plan  Status post hernia repair  I am concerned with her overall appearance that I will send her to the emergency department for evaluation.  I am concerned she may have developed a large abdominal seroma that may need drainage by interventional radiology.  Will also evaluate due to her complicated medical history.             History of Present Illness   Nancy Jones is a 65 y.o. female who presents for follow-up.  She has a history of a large incisional hernia repair with absorbable mesh and a unilateral component separation 4 weeks ago.  She states that last week she has been feeling weak and tired diaphoretic at night.  Complaining of increasing abdominal pain and fullness.  HPI  Review of Systems as per HPI.       Objective   /58 (BP Location: Left arm, Patient Position: Sitting, Cuff Size: Standard)   Pulse 85   Temp (!) 95.1 °F (35.1 °C) (Temporal)   Ht 5' 4\" (1.626 m)   Wt 93 kg (205 lb)   LMP  (LMP Unknown)   SpO2 96%   BMI 35.19 kg/m²      Physical Exam  Abdominal:      Comments: Protuberant abdomen changed from previous.  Incision healed.  No erythema or cellulitis.  Old JENNIFER drain sites with eschar.                 "

## 2025-06-10 NOTE — ASSESSMENT & PLAN NOTE
I am concerned with her overall appearance that I will send her to the emergency department for evaluation.  I am concerned she may have developed a large abdominal seroma that may need drainage by interventional radiology.  Will also evaluate due to her complicated medical history.

## 2025-06-10 NOTE — H&P
H&P - Surgery-General   Name: Nancy Jones 65 y.o. female I MRN: [id number]  Unit/Bed#: ED-39 I Date of Admission: 6/10/2025   Date of Service: 6/10/2025 I Hospital Day: 0     Assessment & Plan    65-year-old female s/p large incisional hernia repair with absorbable mesh and unilateral component separation on 5/5/2025 now presenting with abdominal pain and fullness, feeling generally unwell.  CT scan showing large 23 x 9 cm postoperative fluid collection just above fascia on bilateral sides of the abdomen.    Plan:  - Admission to surgical service  - N.p.o. in case of procedures  - IVF while NPO  - IR consultation for possible drainage of postoperative seroma, appreciate recommendations  - SLIM consultation for management of medical comorbidities, appreciate recommendations  - Analgesia and antiemetic as needed  - Hold all AC for now  - Okay for DVT PPx  - OOB and I-S as able    WBC 9.3  Hgb 11.5  Cr 1.09  T bili 0.66  Lactic acid 1.0    History of Present Illness   Nancy Jones is a 65 y.o. female with past medical history of A-fib, asthma, GERD, hypertension, hyperlipidemia, CKD, CHF, COPD and past surgical history of incisional hernia repair, ex lap, lap appendectomy who presents with abdominal pain and fullness.  She is s/p large incisional hernia repair with absorbable mesh and unilateral component separation DOS 5/5/2025.  Patient was seen this afternoon in clinic by her surgeon Dr. Rankin who was concerned with her overall appearance and sent her to the emergency department for evaluation.  Patient reports last week she began feeling weak and tired as well as diaphoretic at night.  She feels like her abdominal pain and fullness has been increasing lately.    Patient was seen in the ED on 5/22/2025 for similar concerns.  CT A/P at that time showing postoperative changes in the anterior abdominal wall with 13.5 cm seroma in the rectus sheath.    Review of Systems   Constitutional:  Positive for diaphoresis,  fatigue and fever.   Respiratory:  Negative for apnea, cough, chest tightness, shortness of breath and wheezing.    Cardiovascular:  Negative for chest pain, palpitations and leg swelling.   Gastrointestinal:  Positive for abdominal distention and abdominal pain. Negative for constipation, diarrhea and nausea.   Genitourinary:  Negative for difficulty urinating, dysuria and hematuria.   Musculoskeletal: Negative.    Skin:  Negative for rash and wound.   Neurological:  Negative for dizziness, seizures, syncope, speech difficulty, weakness, light-headedness and headaches.   Psychiatric/Behavioral: Negative.             Historical Information   Past Medical History[1]  Past Surgical History[2]  Social History[3]  E-Cigarette/Vaping    E-Cigarette Use Never User      E-Cigarette/Vaping Substances    Nicotine No     THC No     CBD No     Flavoring No     Other No     Unknown No      Family History[4]    Objective :  Temp:  [95.1 °F (35.1 °C)-98.9 °F (37.2 °C)] 98.9 °F (37.2 °C)  HR:  [72-85] 72  BP: ()/(58-64) 131/60  Resp:  [20] 20  SpO2:  [92 %-97 %] 92 %  O2 Device: None (Room air)    Physical Exam  General - no acute distress, responsive and cooperative  CV - warm, regular rate  Pulm - normal work of breathing, no respiratory distress  Abd -soft, moderately tender diffusely throughout the abdomen but more so on the left side, distended but dull to percussion indicative of underlying fluid collection, no signs of peritonitis.  Neuro - m/s grossly intact, cn grossly intact  Ext - moving all extremities       Lab Results: I have reviewed the following results:  Recent Labs     06/10/25  1446   WBC 9.30   HGB 11.5   HCT 35.8   *   SODIUM 135   K 3.4*   CL 96   CO2 26   BUN 14   CREATININE 1.09   GLUC 130   AST 12*   ALT 7   ALB 4.4   TBILI 0.66   ALKPHOS 96   LACTICACID 1.0            [1]   Past Medical History:  Diagnosis Date    A-fib ([facility]) 03/2020    Allergic     Anxiety     Arthritis     Asthma     Back  "pain     and muscle pain    Bruises easily     Chronic pain disorder     Interstitial pain    Colon polyp     Dental bridge present     Depression     Eczema     GERD (gastroesophageal reflux disease)     Heart murmur     History of blood clots     per pt \"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\"    History of pneumonia     Hives     Hyperlipidemia     Hypertension     Infertility, female     Interstitial cystitis     Migraine     sees neurologist    Motion sickness     Obesity     Palpitations     PONV (postoperative nausea and vomiting)     Premature atrial contractions 11/09/2022    Psychiatric disorder     Sleep apnea     TIA (transient ischemic attack) 09/2022    per pt --\"had MRI and 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\"    Urinary incontinence     wears Pads    Venous insufficiency 08/01/2017    Wears glasses    [2]   Past Surgical History:  Procedure Laterality Date    COLONOSCOPY      DILATION AND CURETTAGE OF UTERUS      EGD      EXPLORATORY LAPAROTOMY      for infertility    EXPLORATORY LAPAROTOMY W/ BOWEL RESECTION N/A 7/19/2023    Procedure: LAPAROTOMY EXPLORATORY W/ BOWEL RESECTION;  Surgeon: Crista Recinos MD;  [facility]: [facility];  Service: General    HAND SURGERY      Hand excision of tendon cyst    NE LAPAROSCOPIC APPENDECTOMY N/A 05/03/2022    Procedure: APPENDECTOMY LAPAROSCOPIC;  Surgeon: Vin Fields MD;  Location: [facility];  Service: General    NE LAPS ABD PRTM&OMENTUM DX W/WO SPEC BR/WA SPX N/A 7/19/2023    Procedure: LAPAROSCOPY DIAGNOSTIC, LYSIS OF ADHESIONS, LAPAROSCOPY TAKE TAKEDOWN OF LEFT COLON, EXPLORATORY LAPAROSCOPY, LYSIS OF ADHESIONS, RESECTION OF TRANSVERSE COLON SPLENIC FLEXURE AND DESCENDING COLON , TAKE DOWN OF SPLENIC FLEXURE, SIGMOIDOSCOPY, MOBILIZATION OF RIGHT COLON, PRIMARY ANASTOMOSIS EEA 29, TAP BLOCK;  Surgeon: Crista Recinos MD;  [facility];  Service: General    NE RPR AA HERNIA RECR > 10 CM REDUCIBLE N/A 5/5/2025    Procedure: " REPAIR HERNIA INCISIONAL 20 cm, retro rectus repair with Phasic mesh.  Unilateral myofascial release external oblique component seperation. TAP block;  Surgeon: Jewel Rankin MD;  [facility];  Service: General    FL TCAT IV STENT CRV CRTD ART EMBOLIC PROTECJ Left 2024    Procedure: ANGIOPLASTY ARTERY CAROTID W/ STENT;  Surgeon: Roberto García DO;  [facility];  Service: Vascular    FL TEAEC W/PATCH GRF CAROTID VERTB SUBCLAV NECK INC Left 2023    Procedure: EXPLORATION OF LEFT CAROTID ARTERY; ABORTED ENDARTERECTOMY ARTERY CAROTID;  Surgeon: Roberto García DO;  Location[facility];  Service: Vascular    SUPERIOR MESTENTERIC ARTERY STENT      WISDOM TOOTH EXTRACTION     [3]   Social History  Tobacco Use    Smoking status: Former     Current packs/day: 0.00     Average packs/day: 0.5 packs/day for 10.0 years (5.0 ttl pk-yrs)     Types: Cigarettes     Start date:      Quit date: 2019     Years since quittin.4     Passive exposure: Past    Smokeless tobacco: Never   Vaping Use    Vaping status: Never Used   Substance and Sexual Activity    Alcohol use: Not Currently    Drug use: No    Sexual activity: Not Currently     Comment: defer   [4]   Family History  Problem Relation Name Age of Onset    Lung cancer Mother Catrachita 60    Brain cancer Mother Catrachita 60    Diabetes Father Rafael     Depression Father Rafael     Other Father Rafael         septic    Allergies Sister Kendra     Hashimoto's thyroiditis Sister Kendra     Abdominal aortic aneurysm Sister Maria T     Diabetes Sister Aarti     No Known Problems Sister Karen     No Known Problems Maternal Grandmother      No Known Problems Maternal Grandfather      No Known Problems Paternal Grandmother      No Known Problems Paternal Grandfather      Hodgkin's lymphoma Brother Rafael     No Known Problems Brother Christopher     No Known Problems Maternal Aunt      Diabetes Other Grandparent     Breast cancer Neg Hx

## 2025-06-11 ENCOUNTER — APPOINTMENT (INPATIENT)
Dept: RADIOLOGY | Facility: HOSPITAL | Age: 65
End: 2025-06-11
Attending: PHYSICIAN ASSISTANT
Payer: COMMERCIAL

## 2025-06-11 LAB
ANION GAP SERPL CALCULATED.3IONS-SCNC: 12 MMOL/L (ref 4–13)
BASOPHILS # BLD AUTO: 0.06 THOUSANDS/ÂΜL (ref 0–0.1)
BASOPHILS NFR BLD AUTO: 1 % (ref 0–1)
BUN SERPL-MCNC: 12 MG/DL (ref 5–25)
CALCIUM SERPL-MCNC: 9.3 MG/DL (ref 8.4–10.2)
CHLORIDE SERPL-SCNC: 96 MMOL/L (ref 96–108)
CO2 SERPL-SCNC: 26 MMOL/L (ref 21–32)
CREAT SERPL-MCNC: 0.84 MG/DL (ref 0.6–1.3)
EOSINOPHIL # BLD AUTO: 0.24 THOUSAND/ÂΜL (ref 0–0.61)
EOSINOPHIL NFR BLD AUTO: 3 % (ref 0–6)
ERYTHROCYTE [DISTWIDTH] IN BLOOD BY AUTOMATED COUNT: 13 % (ref 11.6–15.1)
GFR SERPL CREATININE-BSD FRML MDRD: 72 ML/MIN/1.73SQ M
GLUCOSE SERPL-MCNC: 88 MG/DL (ref 65–140)
HCT VFR BLD AUTO: 33.5 % (ref 34.8–46.1)
HGB BLD-MCNC: 10.7 G/DL (ref 11.5–15.4)
IMM GRANULOCYTES # BLD AUTO: 0.04 THOUSAND/UL (ref 0–0.2)
IMM GRANULOCYTES NFR BLD AUTO: 1 % (ref 0–2)
LYMPHOCYTES # BLD AUTO: 1.59 THOUSANDS/ÂΜL (ref 0.6–4.47)
LYMPHOCYTES NFR BLD AUTO: 19 % (ref 14–44)
MAGNESIUM SERPL-MCNC: 2.1 MG/DL (ref 1.9–2.7)
MCH RBC QN AUTO: 28.4 PG (ref 26.8–34.3)
MCHC RBC AUTO-ENTMCNC: 31.9 G/DL (ref 31.4–37.4)
MCV RBC AUTO: 89 FL (ref 82–98)
MONOCYTES # BLD AUTO: 1.01 THOUSAND/ÂΜL (ref 0.17–1.22)
MONOCYTES NFR BLD AUTO: 12 % (ref 4–12)
NEUTROPHILS # BLD AUTO: 5.26 THOUSANDS/ÂΜL (ref 1.85–7.62)
NEUTS SEG NFR BLD AUTO: 64 % (ref 43–75)
NRBC BLD AUTO-RTO: 0 /100 WBCS
PLATELET # BLD AUTO: 366 THOUSANDS/UL (ref 149–390)
PMV BLD AUTO: 8.7 FL (ref 8.9–12.7)
POTASSIUM SERPL-SCNC: 3.4 MMOL/L (ref 3.5–5.3)
RBC # BLD AUTO: 3.77 MILLION/UL (ref 3.81–5.12)
SODIUM SERPL-SCNC: 134 MMOL/L (ref 135–147)
WBC # BLD AUTO: 8.2 THOUSAND/UL (ref 4.31–10.16)

## 2025-06-11 PROCEDURE — 87075 CULTR BACTERIA EXCEPT BLOOD: CPT

## 2025-06-11 PROCEDURE — 99024 POSTOP FOLLOW-UP VISIT: CPT | Performed by: SPECIALIST

## 2025-06-11 PROCEDURE — 10030 IMG GID FLU COLL DRG SFT TIS: CPT | Performed by: PHYSICIAN ASSISTANT

## 2025-06-11 PROCEDURE — 87077 CULTURE AEROBIC IDENTIFY: CPT

## 2025-06-11 PROCEDURE — C1769 GUIDE WIRE: HCPCS | Performed by: PHYSICIAN ASSISTANT

## 2025-06-11 PROCEDURE — 87070 CULTURE OTHR SPECIMN AEROBIC: CPT

## 2025-06-11 PROCEDURE — 80048 BASIC METABOLIC PNL TOTAL CA: CPT

## 2025-06-11 PROCEDURE — 83735 ASSAY OF MAGNESIUM: CPT

## 2025-06-11 PROCEDURE — 0W9F30Z DRAINAGE OF ABDOMINAL WALL WITH DRAINAGE DEVICE, PERCUTANEOUS APPROACH: ICD-10-PCS | Performed by: RADIOLOGY

## 2025-06-11 PROCEDURE — 87205 SMEAR GRAM STAIN: CPT

## 2025-06-11 PROCEDURE — 10030 IMG GID FLU COLL DRG SFT TIS: CPT

## 2025-06-11 PROCEDURE — NC001 PR NO CHARGE: Performed by: PHYSICIAN ASSISTANT

## 2025-06-11 PROCEDURE — 87181 SC STD AGAR DILUTION PER AGT: CPT

## 2025-06-11 PROCEDURE — 85025 COMPLETE CBC W/AUTO DIFF WBC: CPT

## 2025-06-11 PROCEDURE — C1729 CATH, DRAINAGE: HCPCS | Performed by: PHYSICIAN ASSISTANT

## 2025-06-11 RX ORDER — ONDANSETRON 2 MG/ML
4 INJECTION INTRAMUSCULAR; INTRAVENOUS EVERY 6 HOURS PRN
Status: DISCONTINUED | OUTPATIENT
Start: 2025-06-11 | End: 2025-06-13 | Stop reason: HOSPADM

## 2025-06-11 RX ORDER — PHENAZOPYRIDINE HYDROCHLORIDE 100 MG/1
200 TABLET, FILM COATED ORAL
Status: DISCONTINUED | OUTPATIENT
Start: 2025-06-12 | End: 2025-06-11

## 2025-06-11 RX ORDER — LIDOCAINE WITH 8.4% SOD BICARB 0.9%(10ML)
SYRINGE (ML) INJECTION AS NEEDED
Status: COMPLETED | OUTPATIENT
Start: 2025-06-11 | End: 2025-06-11

## 2025-06-11 RX ORDER — PHENAZOPYRIDINE HYDROCHLORIDE 100 MG/1
200 TABLET, FILM COATED ORAL
Status: DISCONTINUED | OUTPATIENT
Start: 2025-06-11 | End: 2025-06-13 | Stop reason: HOSPADM

## 2025-06-11 RX ORDER — TRAMADOL HYDROCHLORIDE 50 MG/1
100 TABLET ORAL EVERY 6 HOURS PRN
Status: DISCONTINUED | OUTPATIENT
Start: 2025-06-11 | End: 2025-06-13 | Stop reason: HOSPADM

## 2025-06-11 RX ORDER — SUCRALFATE 1 G/1
1 TABLET ORAL 4 TIMES DAILY
Status: DISCONTINUED | OUTPATIENT
Start: 2025-06-11 | End: 2025-06-13 | Stop reason: HOSPADM

## 2025-06-11 RX ORDER — SODIUM CHLORIDE 9 MG/ML
10 INJECTION, SOLUTION INTRAMUSCULAR; INTRAVENOUS; SUBCUTANEOUS DAILY
Qty: 300 ML | Refills: 2 | Status: SHIPPED | OUTPATIENT
Start: 2025-06-11 | End: 2025-09-09

## 2025-06-11 RX ORDER — DICYCLOMINE HYDROCHLORIDE 10 MG/1
10 CAPSULE ORAL EVERY 8 HOURS PRN
Status: DISCONTINUED | OUTPATIENT
Start: 2025-06-11 | End: 2025-06-13 | Stop reason: HOSPADM

## 2025-06-11 RX ORDER — OXYCODONE HYDROCHLORIDE 5 MG/1
5 TABLET ORAL EVERY 4 HOURS PRN
Refills: 0 | Status: DISCONTINUED | OUTPATIENT
Start: 2025-06-11 | End: 2025-06-13 | Stop reason: HOSPADM

## 2025-06-11 RX ORDER — DILTIAZEM HYDROCHLORIDE 240 MG/1
240 CAPSULE, COATED, EXTENDED RELEASE ORAL DAILY
Status: DISCONTINUED | OUTPATIENT
Start: 2025-06-11 | End: 2025-06-13 | Stop reason: HOSPADM

## 2025-06-11 RX ADMIN — ONDANSETRON 4 MG: 4 TABLET, ORALLY DISINTEGRATING ORAL at 17:28

## 2025-06-11 RX ADMIN — SPIRONOLACTONE 25 MG: 25 TABLET ORAL at 09:14

## 2025-06-11 RX ADMIN — ACETAMINOPHEN 650 MG: 325 TABLET, FILM COATED ORAL at 21:56

## 2025-06-11 RX ADMIN — FUROSEMIDE 40 MG: 40 TABLET ORAL at 09:14

## 2025-06-11 RX ADMIN — TRAMADOL HYDROCHLORIDE 100 MG: 50 TABLET, COATED ORAL at 09:25

## 2025-06-11 RX ADMIN — ONDANSETRON 4 MG: 2 INJECTION INTRAMUSCULAR; INTRAVENOUS at 03:15

## 2025-06-11 RX ADMIN — LORAZEPAM 2 MG: 1 TABLET ORAL at 14:15

## 2025-06-11 RX ADMIN — METOPROLOL SUCCINATE 100 MG: 100 TABLET, EXTENDED RELEASE ORAL at 09:14

## 2025-06-11 RX ADMIN — DILTIAZEM HYDROCHLORIDE 240 MG: 240 CAPSULE, EXTENDED RELEASE ORAL at 15:58

## 2025-06-11 RX ADMIN — FLUTICASONE FUROATE AND VILANTEROL TRIFENATATE 1 PUFF: 200; 25 POWDER RESPIRATORY (INHALATION) at 09:13

## 2025-06-11 RX ADMIN — TRAMADOL HYDROCHLORIDE 100 MG: 50 TABLET, COATED ORAL at 03:14

## 2025-06-11 RX ADMIN — PANTOPRAZOLE SODIUM 20 MG: 20 TABLET, DELAYED RELEASE ORAL at 07:31

## 2025-06-11 RX ADMIN — TRAMADOL HYDROCHLORIDE 100 MG: 50 TABLET, COATED ORAL at 21:56

## 2025-06-11 RX ADMIN — METHOCARBAMOL 1000 MG: 500 TABLET ORAL at 16:07

## 2025-06-11 RX ADMIN — PHENAZOPYRIDINE 200 MG: 100 TABLET ORAL at 22:36

## 2025-06-11 RX ADMIN — ACETAMINOPHEN 650 MG: 325 TABLET, FILM COATED ORAL at 06:11

## 2025-06-11 RX ADMIN — HEPARIN SODIUM 5000 UNITS: 5000 INJECTION INTRAVENOUS; SUBCUTANEOUS at 21:56

## 2025-06-11 RX ADMIN — HEPARIN SODIUM 5000 UNITS: 5000 INJECTION INTRAVENOUS; SUBCUTANEOUS at 05:46

## 2025-06-11 RX ADMIN — METHOCARBAMOL 1000 MG: 500 TABLET ORAL at 21:56

## 2025-06-11 RX ADMIN — METOPROLOL SUCCINATE 100 MG: 100 TABLET, EXTENDED RELEASE ORAL at 21:57

## 2025-06-11 RX ADMIN — Medication 10 ML: at 14:53

## 2025-06-11 RX ADMIN — PANTOPRAZOLE SODIUM 20 MG: 20 TABLET, DELAYED RELEASE ORAL at 16:07

## 2025-06-11 RX ADMIN — SPIRONOLACTONE 25 MG: 25 TABLET ORAL at 21:57

## 2025-06-11 RX ADMIN — SUCRALFATE 1 G: 1 TABLET ORAL at 22:36

## 2025-06-11 RX ADMIN — FUROSEMIDE 20 MG: 20 TABLET ORAL at 09:14

## 2025-06-11 RX ADMIN — METHOCARBAMOL 1000 MG: 500 TABLET ORAL at 09:14

## 2025-06-11 NOTE — PROGRESS NOTES
Progress Note - Surgery-General   Name: Nancy Jones 65 y.o. female I MRN: [id number]  Unit/Bed#: E2 -01 I Date of Admission: 6/10/2025   Date of Service: 6/10/2025 I Hospital Day: 0     Assessment & Plan  Abdominal wall seroma  65-year-old female s/p incisional site hernia repair with absorbable mesh on 5/5 now with postoperative seroma    Plan:  -Follow-up IR for possible drainage of seroma  -N.p.o.   -Pain and nausea control as needed  -Trend labs  -Monitor vitals  -OOB, ambulate  -I-S use  -DVT prophylaxis  -Maintenance IV fluids  -Hold anticoagulation for now    24 Hour Events : No acute overnight events.  Subjective : Patient still having 5 out of 10 diffuse abdominal pain.  Patient feels nervous for upcoming IR procedure today.  She also has associated nausea overnight.  No vomiting.  She denies fevers, chills, chest pain, or shortness of breath    Objective :  Visit Vitals  /56 (BP Location: Left arm)   Pulse 79   Temp 99.5 °F (37.5 °C) (Oral)   Resp 18   Wt 91.6 kg (202 lb)   LMP  (LMP Unknown)   SpO2 90%   BMI 34.67 kg/m²   OB Status Postmenopausal   Smoking Status Former   BSA 1.96 m²        Physical Exam  Constitutional:       Appearance: Normal appearance. She is not ill-appearing, toxic-appearing or diaphoretic.     Cardiovascular:      Rate and Rhythm: Normal rate and regular rhythm.      Pulses: Normal pulses.      Heart sounds: Normal heart sounds. No murmur heard.  Pulmonary:      Effort: Pulmonary effort is normal. No respiratory distress.      Breath sounds: No wheezing.   Abdominal:      General: Abdomen is flat. There is distension.      Palpations: Abdomen is soft.      Tenderness: There is abdominal tenderness (Mild tenderness to palpation throughout abdomen.  Mostly on left hemiabdomen.).     Skin:     General: Skin is warm and dry.      Coloration: Skin is not jaundiced.     Neurological:      General: No focal deficit present.      Mental Status: She is alert and oriented to  person, place, and time.         Lab Results: I have reviewed the following results:  Recent Labs     06/10/25  1446 06/11/25  0538   WBC 9.30 8.20   HGB 11.5 10.7*   * 366   SODIUM 135  --    K 3.4*  --    CL 96  --    CO2 26  --    BUN 14  --    CREATININE 1.09  --    GLUC 130  --    CALCIUM 9.8  --    AST 12*  --    ALT 7  --    ALKPHOS 96  --    TBILI 0.66  --           VTE Pharmacologic Prophylaxis: VTE covered by:  heparin (porcine), Subcutaneous, 5,000 Units at 06/10/25 1634     VTE Mechanical Prophylaxis: sequential compression device

## 2025-06-11 NOTE — ASSESSMENT & PLAN NOTE
65-year-old female s/p incisional site hernia repair with absorbable mesh on 5/5 now with postoperative seroma    Plan:  -Follow-up IR for possible drainage of seroma  -N.p.o.   -Pain and nausea control as needed  -Trend labs  -Monitor vitals  -OOB, ambulate  -I-S use  -DVT prophylaxis  -Maintenance IV fluids  -Hold anticoagulation for now

## 2025-06-11 NOTE — DISCHARGE INSTRUCTIONS
"                                                                      TUBE CARE INSTRUCTIONS    Care after your procedure:    Resume your normal diet. Small sips of flat soda will help with nausea.    1. The properly functioning catheter should be forward flushed once (1x) daily with 10ml of normal saline using clean technique. You will be given a prescription for flushes.   To flush the tube, clean both connections with alcohol swab.Twist off the drainage bag/ bulb  tubing and twist the saline syringe into the drainage tube and flush. Remove the syringe and twist the drainage bag / bulb tubing tubing back on.    2. The drainage bag/bulb may be emptied as necessary. Keep a record of the amount of fluid you drain from your tube. This should be done with clean technique as well.     3. A fresh dressing should be applied daily over the tube insertion site.     4. As the tube is secured to the skin with only a suture,try not to pull on your tube. Tub baths are not permitted. Showers are permitted if the patient's skin entry site is prevented from getting wet. Similarly, washcloth \"baths\" are acceptable.     Contact Interventional Radiology at [phone] if:    1. Leakage or large amounts of liquid around the catheter.    2. Fever of 101 degrees lasting several hours without other obvious cause (such as sore throat, flu, etc).    3. Persistent nausea or vomiting.    4. Diminished drainage, which may be associated with pressure or pain. Or when the     drainage from your tube is less than 10mls for 48 hours.    5. Catheter pulled back or falls out.      The following pharmacies carry the flush syringes.       [facility]                     [facility] [address]                         [phone]  [place]                       [facility]  Phone [phone]            Phone [phone]                         "                                                                               Aaron Daigle   [facility]             [facility]                                [phone]  [address] [place]  Phone [phone]            Phone [phone]                      [facility]                                                                                                          [phone]  [facility]  [address]  [place]                                                                               [facility]  Phone [phone]

## 2025-06-11 NOTE — TELEMEDICINE
e-Consult (IPC)  - Interventional Radiology  Nancy Jones 65 y.o. female MRN: [id number]  Unit/Bed#: E2 -01 Encounter: [id number]          Interventional Radiology has been consulted to evaluate Nancy Jones    We were consulted by surgery concerning this patient with abdominal wall seroma after incisional hernia repair with absorbable mesh.  Care was performed on 5/5.  Since that time patient has developed abdominal distention and discomfort.  Imaging reveals large abdominal wall seroma.  IR has been consulted regarding possible drainage of this fluid collection.    Inpatient Consult to IR  Consult performed by: Teo Holley PA-C  Consult ordered by: Arash Kasper DO        06/11/25    Assessment/Recommendation:     Abdominal Wall Seroma  - Plan for abdominal wall seroma drainage today, pending IR scheduling  - Case reviewed with Dr. Calvillo    11-20 minutes, >50% of the total time devoted to medical consultative verbal/EMR discussion between providers. Written report will be generated in the EMR.     Thank you for allowing Interventional Radiology to participate in the care of Nancy Jones. Please don't hesitate to call or TigerText us with any questions.     Teo Holley PA-C

## 2025-06-11 NOTE — BRIEF OP NOTE (RAD/CATH)
IR DRAINAGE TUBE PLACEMENT Procedure Note    PATIENT NAME: Nancy Jones  : 1960  MRN: [id number]    Pre-op Diagnosis:   1. Postoperative seroma of subcutaneous tissue after non-dermatologic procedure      Post-op Diagnosis:   1. Postoperative seroma of subcutaneous tissue after non-dermatologic procedure        Provider:   Teo Holley PA-C    Estimated Blood Loss: minimal    Findings: loculated abdominal wall fluid collection, 700cc drain before being placed to bulb, drainage noted to be slow due to extensive loculation, but progressing.    Specimens: collected, clear yellow fluid    Complications:  none immediate    Anesthesia: local    Teo Holley PA-C     Date: 2025  Time: 3:19 PM

## 2025-06-11 NOTE — ASSESSMENT & PLAN NOTE
65F s/p 5/5 incisional site hernia repair with absorbable mesh p/w postop seroma.  Now s/p 6/11 IR drain placement with 700cc drained.    Plan:  -continue cardiac diet  -maintain JENNIFER drain, f/u cx  -restart home eliquis today  -hopeful dc home today

## 2025-06-11 NOTE — PLAN OF CARE
Problem: Potential for Falls  Goal: Patient will remain free of falls  Description: INTERVENTIONS:  - Educate patient/family on patient safety including physical limitations  - Instruct patient to call for assistance with activity   - Consider consulting OT/PT to assist with strengthening/mobility based on AM PAC & JH-HLM score  - Consult OT/PT to assist with strengthening/mobility   - Keep Call bell within reach  - Keep bed low and locked with side rails adjusted as appropriate  - Keep care items and personal belongings within reach  - Initiate and maintain comfort rounds  - Make Fall Risk Sign visible to staff  - Offer Toileting every 2 Hours, in advance of need  - Initiate/Maintain bed alarm  - Obtain necessary fall risk management equipment:   - Apply yellow socks and bracelet for high fall risk patients  - Consider moving patient to room near nurses station  Outcome: Progressing     Problem: Nutrition/Hydration-ADULT  Goal: Nutrient/Hydration intake appropriate for improving, restoring or maintaining nutritional needs  Description: Monitor and assess patient's nutrition/hydration status for malnutrition. Collaborate with interdisciplinary team and initiate plan and interventions as ordered.  Monitor patient's weight and dietary intake as ordered or per policy. Utilize nutrition screening tool and intervene as necessary. Determine patient's food preferences and provide high-protein, high-caloric foods as appropriate.     INTERVENTIONS:  - Monitor oral intake, urinary output, labs, and treatment plans  - Assess nutrition and hydration status and recommend course of action  - Evaluate amount of meals eaten  - Assist patient with eating if necessary   - Allow adequate time for meals  - Recommend/ encourage appropriate diets, oral nutritional supplements, and vitamin/mineral supplements  - Order, calculate, and assess calorie counts as needed  - Recommend, monitor, and adjust tube feedings and TPN/PPN based on  assessed needs  - Assess need for intravenous fluids  - Provide specific nutrition/hydration education as appropriate  - Include patient/family/caregiver in decisions related to nutrition  Outcome: Progressing

## 2025-06-11 NOTE — PLAN OF CARE
Problem: Potential for Falls  Goal: Patient will remain free of falls  Description: INTERVENTIONS:  - Educate patient/family on patient safety including physical limitations  - Instruct patient to call for assistance with activity   - Consider consulting OT/PT to assist with strengthening/mobility based on AM PAC & JH-HLM score  - Consult OT/PT to assist with strengthening/mobility   - Keep Call bell within reach  - Keep bed low and locked with side rails adjusted as appropriate  - Keep care items and personal belongings within reach  - Initiate and maintain comfort rounds  - Make Fall Risk Sign visible to staff  - Apply yellow socks and bracelet for high fall risk patients  - Consider moving patient to room near nurses station  6/11/2025 1150 by Brett Paz RN  Outcome: Progressing  6/11/2025 1150 by Brett Paz RN  Outcome: Progressing     Problem: Nutrition/Hydration-ADULT  Goal: Nutrient/Hydration intake appropriate for improving, restoring or maintaining nutritional needs  Description: Monitor and assess patient's nutrition/hydration status for malnutrition. Collaborate with interdisciplinary team and initiate plan and interventions as ordered.  Monitor patient's weight and dietary intake as ordered or per policy. Utilize nutrition screening tool and intervene as necessary. Determine patient's food preferences and provide high-protein, high-caloric foods as appropriate.     INTERVENTIONS:  - Monitor oral intake, urinary output, labs, and treatment plans  - Assess nutrition and hydration status and recommend course of action  - Evaluate amount of meals eaten  - Assist patient with eating if necessary   - Allow adequate time for meals  - Recommend/ encourage appropriate diets, oral nutritional supplements, and vitamin/mineral supplements  - Order, calculate, and assess calorie counts as needed  - Recommend, monitor, and adjust tube feedings and TPN/PPN based on assessed needs  - Assess need for intravenous  fluids  - Provide specific nutrition/hydration education as appropriate  - Include patient/family/caregiver in decisions related to nutrition  6/11/2025 1150 by Brett Paz RN  Outcome: Progressing  6/11/2025 1150 by Brett Paz RN  Outcome: Progressing     Problem: PAIN - ADULT  Goal: Verbalizes/displays adequate comfort level or baseline comfort level  Description: Interventions:  - Encourage patient to monitor pain and request assistance  - Assess pain using appropriate pain scale  - Administer analgesics as ordered based on type and severity of pain and evaluate response  - Implement non-pharmacological measures as appropriate and evaluate response  - Consider cultural and social influences on pain and pain management  - Notify physician/advanced practitioner if interventions unsuccessful or patient reports new pain  - Educate patient/family on pain management process including their role and importance of  reporting pain   - Provide non-pharmacologic/complimentary pain relief interventions  Outcome: Progressing     Problem: INFECTION - ADULT  Goal: Absence or prevention of progression during hospitalization  Description: INTERVENTIONS:  - Assess and monitor for signs and symptoms of infection  - Monitor lab/diagnostic results  - Monitor all insertion sites, i.e. indwelling lines, tubes, and drains  - Monitor endotracheal if appropriate and nasal secretions for changes in amount and color  - [place] appropriate cooling/warming therapies per order  - Administer medications as ordered  - Instruct and encourage patient and family to use good hand hygiene technique  - Identify and instruct in appropriate isolation precautions for identified infection/condition  Outcome: Progressing  Goal: Absence of fever/infection during neutropenic period  Description: INTERVENTIONS:  - Monitor WBC  - Perform strict hand hygiene  - Limit to healthy visitors only  - No plants, dried, fresh or silk flowers with south in patient  room  Outcome: Progressing     Problem: Knowledge Deficit  Goal: Patient/family/caregiver demonstrates understanding of disease process, treatment plan, medications, and discharge instructions  Description: Complete learning assessment and assess knowledge base.  Interventions:  - Provide teaching at level of understanding  - Provide teaching via preferred learning methods  Outcome: Progressing     Problem: GASTROINTESTINAL - ADULT  Goal: Minimal or absence of nausea and/or vomiting  Description: INTERVENTIONS:  - Administer IV fluids if ordered to ensure adequate hydration  - Maintain NPO status until nausea and vomiting are resolved  - Nasogastric tube if ordered  - Administer ordered antiemetic medications as needed  - Provide nonpharmacologic comfort measures as appropriate  - Advance diet as tolerated, if ordered  - Consider nutrition services referral to assist patient with adequate nutrition and appropriate food choices  Outcome: Progressing  Goal: Maintains or returns to baseline bowel function  Description: INTERVENTIONS:  - Assess bowel function  - Encourage oral fluids to ensure adequate hydration  - Administer IV fluids if ordered to ensure adequate hydration  - Administer ordered medications as needed  - Encourage mobilization and activity  - Consider nutritional services referral to assist patient with adequate nutrition and appropriate food choices  Outcome: Progressing  Goal: Maintains adequate nutritional intake  Description: INTERVENTIONS:  - Monitor percentage of each meal consumed  - Identify factors contributing to decreased intake, treat as appropriate  - Assist with meals as needed  - Monitor I&O, weight, and lab values if indicated  - Obtain nutrition services referral as needed  Outcome: Progressing  Goal: Establish and maintain optimal ostomy function  Description: INTERVENTIONS:  - Assess bowel function  - Encourage oral fluids to ensure adequate hydration  - Administer IV fluids if  ordered to ensure adequate hydration   - Administer ordered medications as needed  - Encourage mobilization and activity  - Nutrition services referral to assist patient with appropriate food choices  - Assess stoma site  - Consider wound care consult   Outcome: Progressing  Goal: Oral mucous membranes remain intact  Description: INTERVENTIONS  - Assess oral mucosa and hygiene practices  - Implement preventative oral hygiene regimen  - Implement oral medicated treatments as ordered  - Initiate Nutrition services referral as needed  Outcome: Progressing

## 2025-06-11 NOTE — PROGRESS NOTES
Progress Note - Surgery-General   Name: Nancy Jones 65 y.o. female I MRN: [id number]  Unit/Bed#: E2 -01 I Date of Admission: 6/10/2025   Date of Service: 6/12/2025 I Hospital Day: 2     Assessment & Plan  Abdominal wall seroma  65F s/p 5/5 incisional site hernia repair with absorbable mesh p/w postop seroma.  Now s/p 6/11 IR drain placement with 700cc drained.    Plan:  -continue cardiac diet  -maintain JENNIFER drain, f/u cx  -restart home eliquis today  -hopeful dc home today    Subjective/Objective   NAOE. Pain controlled. Tolerating little bit of diet, mild nausea, no emesis. Had BM. Voiding. Walking.    Physical Exam:  General: NAD  CV: nl rate  Lungs: nl wob. No resp distress.  ABD: Soft, protuberant, NT. JENNIFER 980cc serous.  Extrem: No CCE  UOP x3    Patient Vitals for the past 24 hrs:   BP Temp Temp src Pulse Resp SpO2   06/12/25 0000 111/66 98.8 °F (37.1 °C) Temporal 69 18 90 %   06/11/25 2300 111/66 98.8 °F (37.1 °C) Temporal 69 18 90 %   06/11/25 2100 121/65 -- -- 75 -- --   06/11/25 1558 139/58 -- -- 83 18 92 %   06/11/25 0758 112/54 98.6 °F (37 °C) Temporal 69 18 92 %       I/O         06/09 0701  06/10 0700 06/10 0701  06/11 0700 06/11 0701  06/12 0700    P.O.   0    Total Intake(mL/kg)   0 (0)    Urine (mL/kg/hr)   0 (0)    Drains   700    Total Output   700    Net   -700           Unmeasured Urine Occurrence  1 x 1 x            Recent Labs     06/10/25  1446 06/11/25  0538   WBC 9.30 8.20   HGB 11.5 10.7*   * 366   SODIUM 135 134*   K 3.4* 3.4*   CL 96 96   CO2 26 26   BUN 14 12   CREATININE 1.09 0.84   GLUC 130 88   CALCIUM 9.8 9.3   MG  --  2.1   AST 12*  --    ALT 7  --    ALKPHOS 96  --    TBILI 0.66  --    EGFR 52 72   LACTICACID 1.0  --      Lab Results   Component Value Date    URINECX >100,000 cfu/ml Enterococcus durans (A) 04/21/2025    LEUKOCYTESUR Negative 05/22/2025

## 2025-06-11 NOTE — UTILIZATION REVIEW
Initial Clinical Review    Admission: Date/Time/Statement:   Admission Orders (From admission, onward)       Ordered        06/10/25 1601  Inpatient Admission  Once                          Orders Placed This Encounter   Procedures    Inpatient Admission     Standing Status:   Standing     Number of Occurrences:   1     Level of Care:   Med Surg [16]     Estimated length of stay:   More than 2 Midnights     Certification:   I certify that inpatient services are medically necessary for this patient for a duration of greater than two midnights. See H&P and MD Progress Notes for additional information about the patient's course of treatment.     ED Arrival Information       Expected   -    Arrival   6/10/2025 14:02    Acuity   Urgent              Means of arrival   Wheelchair    Escorted by   Self    Service   Surgery-General    Admission type   Emergency              Arrival complaint   fluid retention/ abdominal wall seroma             Chief Complaint   Patient presents with    Abdominal Pain     Patient reports increased abd pain and swelling since hernia repair in May. Was seen at Dr. Rankin's office today and then sent to ED for further evaluation. Dr. Rankin noted concern for possible abdominal seroma.       Initial Presentation: 65 y.o. female referred to ED from surgeon office  with abdominal pain and fullness.   She is s/p large incisional hernia repair with absorbable mesh and unilateral component separation DOS 5/5/2025.   Surgeon concerned with overall appearance of incision.  Started feeling weak, tired and was diaphoretic at night over the past week.   Feels  her abdominal pain and fullness has been increasing lately.  In ED on  5/22 with similar  concerns.  CT A/P at that time showing postoperative changes in the anterior abdominal wall with 13.5 cm seroma in the rectus sheath.   Additional PMH  is  Afib, asthma, GERD, HTN, CKD, CHF and COPD.  CT now shows  23 x 9 cm postoperative fluid collection just  above fascia on bilateral sides of the abdomen.  Admit  Ip with Abdominal pain  and plan is  IVF, NPO, pain  contrl, antiemetics as needed, hold  AC and monitor labs.       Date:  6/11   Day 2:   IR consult placed for possible  drainage of seroma.   Procedure possibly today, pending  schedule. Remains  NPO.  Still with 5/10 diffuse abdominal pain.  + nausea overnight. Continue  IVF.    IR DRAINAGE TUBE PLACEMENT Procedure Note     Findings: loculated abdominal wall fluid collection, 700cc drain before being placed to bulb, drainage noted to be slow due to extensive loculation, but progressing.     ED Treatment-Medication Administration from 06/10/2025 1402 to 06/10/2025 1854         Date/Time Order Dose Route Action     06/10/2025 1547 iohexol (OMNIPAQUE) 350 MG/ML injection (MULTI-DOSE) 100 mL 100 mL Intravenous Given     06/10/2025 1629 multi-electrolyte (Plasmalyte-A/Isolyte-S PH 7.4/Normosol-R) IV solution 125 mL/hr Intravenous New Bag     06/10/2025 1634 heparin (porcine) subcutaneous injection 5,000 Units 5,000 Units Subcutaneous Given            Scheduled Medications:  dexamethasone, 2 mg, Oral, Daily  fluticasone-vilanterol, 1 puff, Inhalation, Daily  furosemide, 20 mg, Oral, Every Other Day  furosemide, 40 mg, Oral, Daily  heparin (porcine), 5,000 Units, Subcutaneous, Q8H BONIFACIO  methocarbamol, 1,000 mg, Oral, TID  metoprolol succinate, 100 mg, Oral, BID  pantoprazole, 20 mg, Oral, BID AC  spironolactone, 25 mg, Oral, BID      Continuous IV Infusions:  multi-electrolyte, 125 mL/hr, Intravenous, Continuous      PRN Meds:  acetaminophen, 650 mg, Oral, Q6H PRN  HYDROmorphone, 0.2 mg, Intravenous, Q3H PRN  LORazepam, 2 mg, Oral, Q8H PRN  ondansetron, 4 mg, Intravenous, Q6H PRN  ondansetron, 4 mg, Oral, Q8H PRN  oxyCODONE, 5 mg, Oral, Q4H PRN  oxyCODONE, 2.5 mg, Oral, Q4H PRN  traMADol, 100 mg, Oral, Q6H PRN      ED Triage Vitals [06/10/25 1409]   Temperature Pulse Respirations Blood Pressure SpO2 Pain Score   98.9  °F (37.2 °C) 75 20 96/64 97 % 6     Weight (last 2 days)       Date/Time Weight    06/10/25 1409 91.6 (202)            Vital Signs (last 3 days)       Date/Time Temp Pulse Resp BP MAP (mmHg) SpO2 Calculated FIO2 (%) - Nasal Cannula Nasal Cannula O2 Flow Rate (L/min) O2 Device Patient Position - Orthostatic VS Fran Coma Scale Score Pain    06/11/25 0925 -- -- -- -- -- -- -- -- -- -- -- 7    06/11/25 0758 98.6 °F (37 °C) 69 18 112/54 78 92 % -- -- None (Room air) Lying -- --    06/11/25 0611 -- -- -- -- -- -- -- -- -- -- -- 4    06/11/25 0314 -- -- -- -- -- -- -- -- -- -- -- 10 - Worst Possible Pain    06/10/25 2311 99.5 °F (37.5 °C) 79 18 103/56 74 90 % -- -- None (Room air) Lying -- --    06/10/25 2143 -- -- -- -- -- -- -- -- -- -- -- 9    06/10/25 2022 -- -- -- -- -- -- -- -- -- -- 15 --    06/10/25 1851 97.5 °F (36.4 °C) 103 20 161/86 113 92 % -- -- None (Room air) Sitting -- --    06/10/25 1850 97.5 °F (36.4 °C) 103 20 -- -- 92 % -- -- None (Room air) Lying -- --    06/10/25 1700 -- 70 20 125/59 84 94 % 28 2 L/min Nasal cannula Lying -- --    06/10/25 1600 97.5 °F (36.4 °C) 103 20 161/86 -- 92 % -- -- -- -- -- --    06/10/25 1559 -- 72 20 131/60 -- 92 % -- -- None (Room air) -- -- --    06/10/25 1409 98.9 °F (37.2 °C) 75 20 96/64 -- 97 % -- -- None (Room air) -- -- 6              Pertinent Labs/Diagnostic Test Results:   Radiology:  CT abdomen pelvis w contrast   Final Interpretation by Marlo Vera MD (06/10 1620)      Large low-density rim-enhancing fluid collection within the subcutaneous tissues of the anterior abdominal wall measuring up to approximately 20 cm.   Findings most consistent with a postoperative seroma.   Infection not entirely excluded, drainage with fluid analysis advised.      The study was marked in EPIC for immediate notification.         Workstation performed: [id number]         IR drainage tube placement    (Results Pending)     Cardiology:    GI:        Results from last 7  days   Lab Units 06/11/25  0538 06/10/25  1446   WBC Thousand/uL 8.20 9.30   HEMOGLOBIN g/dL 10.7* 11.5   HEMATOCRIT % 33.5* 35.8   PLATELETS Thousands/uL 366 403*   TOTAL NEUT ABS Thousands/µL 5.26 6.82         Results from last 7 days   Lab Units 06/11/25  0538 06/10/25  1446   SODIUM mmol/L 134* 135   POTASSIUM mmol/L 3.4* 3.4*   CHLORIDE mmol/L 96 96   CO2 mmol/L 26 26   ANION GAP mmol/L 12 13   BUN mg/dL 12 14   CREATININE mg/dL 0.84 1.09   EGFR ml/min/1.73sq m 72 52   CALCIUM mg/dL 9.3 9.8   MAGNESIUM mg/dL 2.1  --      Results from last 7 days   Lab Units 06/10/25  1446   AST U/L 12*   ALT U/L 7   ALK PHOS U/L 96   TOTAL PROTEIN g/dL 7.5   ALBUMIN g/dL 4.4   TOTAL BILIRUBIN mg/dL 0.66         Results from last 7 days   Lab Units 06/11/25  0538 06/10/25  1446   GLUCOSE RANDOM mg/dL 88 130                   Results from last 7 days   Lab Units 06/10/25  1446   LACTIC ACID mmol/L 1.0           Admitting Diagnosis: Abdominal pain [R10.9]  Postoperative seroma of subcutaneous tissue after non-dermatologic procedure [L76.34]  Age/Sex: 65 y.o. female    Network Utilization Review Department  ATTENTION: Please call with any questions or concerns to [phone] and carefully listen to the prompts so that you are directed to the right person. All voicemails are confidential.   For Discharge needs, contact Care Management DC Support Team at [phone] opt. 2  Send all requests for admission clinical reviews, approved or denied determinations and any other requests to dedicated fax number below belonging to the campus where the patient is receiving treatment. List of dedicated fax numbers for the Facilities:  FACILITY NAME UR FAX NUMBER   ADMISSION DENIALS (Administrative/Medical Necessity) [phone]   DISCHARGE SUPPORT TEAM (NETWORK) [phone]   PARENT CHILD HEALTH (Maternity/NICU/Pediatrics) [phone]   [facility] [phone]   [facility] [phone]    Count includes the [facility] [phone]   [facility] [phone]   [facility] [phone]   [facility] [phone]   [facility] [phone]   [facility] [phone]   [facility] [phone]   [facility] [phone]   [facility] [phone]   [facility] [phone]

## 2025-06-12 ENCOUNTER — APPOINTMENT (INPATIENT)
Dept: RADIOLOGY | Facility: HOSPITAL | Age: 65
End: 2025-06-12
Payer: COMMERCIAL

## 2025-06-12 LAB
ATRIAL RATE: 70 BPM
CARDIAC TROPONIN I PNL SERPL HS: 10 NG/L (ref 8–18)
P AXIS: -28 DEGREES
PR INTERVAL: 158 MS
QRS AXIS: -38 DEGREES
QRSD INTERVAL: 98 MS
QT INTERVAL: 446 MS
QTC INTERVAL: 482 MS
T WAVE AXIS: 11 DEGREES
VENTRICULAR RATE: 70 BPM

## 2025-06-12 PROCEDURE — 84484 ASSAY OF TROPONIN QUANT: CPT | Performed by: SPECIALIST

## 2025-06-12 PROCEDURE — 99024 POSTOP FOLLOW-UP VISIT: CPT | Performed by: SPECIALIST

## 2025-06-12 PROCEDURE — 71045 X-RAY EXAM CHEST 1 VIEW: CPT

## 2025-06-12 PROCEDURE — 93010 ELECTROCARDIOGRAM REPORT: CPT | Performed by: INTERNAL MEDICINE

## 2025-06-12 PROCEDURE — NC001 PR NO CHARGE: Performed by: PHYSICIAN ASSISTANT

## 2025-06-12 PROCEDURE — 93005 ELECTROCARDIOGRAM TRACING: CPT

## 2025-06-12 RX ADMIN — PHENAZOPYRIDINE 200 MG: 100 TABLET ORAL at 08:16

## 2025-06-12 RX ADMIN — ACETAMINOPHEN 650 MG: 325 TABLET, FILM COATED ORAL at 04:45

## 2025-06-12 RX ADMIN — METHOCARBAMOL 1000 MG: 500 TABLET ORAL at 20:37

## 2025-06-12 RX ADMIN — SPIRONOLACTONE 25 MG: 25 TABLET ORAL at 08:26

## 2025-06-12 RX ADMIN — METHOCARBAMOL 1000 MG: 500 TABLET ORAL at 16:45

## 2025-06-12 RX ADMIN — LORAZEPAM 2 MG: 1 TABLET ORAL at 04:45

## 2025-06-12 RX ADMIN — FLUTICASONE FUROATE AND VILANTEROL TRIFENATATE 1 PUFF: 200; 25 POWDER RESPIRATORY (INHALATION) at 08:31

## 2025-06-12 RX ADMIN — METHOCARBAMOL 1000 MG: 500 TABLET ORAL at 08:16

## 2025-06-12 RX ADMIN — HEPARIN SODIUM 5000 UNITS: 5000 INJECTION INTRAVENOUS; SUBCUTANEOUS at 06:08

## 2025-06-12 RX ADMIN — METOPROLOL SUCCINATE 100 MG: 100 TABLET, EXTENDED RELEASE ORAL at 21:26

## 2025-06-12 RX ADMIN — SUCRALFATE 1 G: 1 TABLET ORAL at 08:17

## 2025-06-12 RX ADMIN — ONDANSETRON 4 MG: 2 INJECTION INTRAMUSCULAR; INTRAVENOUS at 21:26

## 2025-06-12 RX ADMIN — ACETAMINOPHEN 650 MG: 325 TABLET, FILM COATED ORAL at 11:58

## 2025-06-12 RX ADMIN — SUCRALFATE 1 G: 1 TABLET ORAL at 11:58

## 2025-06-12 RX ADMIN — ONDANSETRON 4 MG: 4 TABLET, ORALLY DISINTEGRATING ORAL at 04:44

## 2025-06-12 RX ADMIN — FUROSEMIDE 40 MG: 40 TABLET ORAL at 08:17

## 2025-06-12 RX ADMIN — TRAMADOL HYDROCHLORIDE 100 MG: 50 TABLET, COATED ORAL at 04:45

## 2025-06-12 RX ADMIN — METOPROLOL SUCCINATE 100 MG: 100 TABLET, EXTENDED RELEASE ORAL at 08:26

## 2025-06-12 RX ADMIN — APIXABAN 5 MG: 5 TABLET, FILM COATED ORAL at 17:04

## 2025-06-12 RX ADMIN — SUCRALFATE 1 G: 1 TABLET ORAL at 21:26

## 2025-06-12 RX ADMIN — TRAMADOL HYDROCHLORIDE 100 MG: 50 TABLET, COATED ORAL at 12:03

## 2025-06-12 RX ADMIN — DILTIAZEM HYDROCHLORIDE 240 MG: 240 CAPSULE, EXTENDED RELEASE ORAL at 08:26

## 2025-06-12 RX ADMIN — PANTOPRAZOLE SODIUM 20 MG: 20 TABLET, DELAYED RELEASE ORAL at 06:08

## 2025-06-12 RX ADMIN — PHENAZOPYRIDINE 200 MG: 100 TABLET ORAL at 16:46

## 2025-06-12 RX ADMIN — ONDANSETRON 4 MG: 4 TABLET, ORALLY DISINTEGRATING ORAL at 20:37

## 2025-06-12 RX ADMIN — TRAMADOL HYDROCHLORIDE 100 MG: 50 TABLET, COATED ORAL at 20:37

## 2025-06-12 RX ADMIN — SPIRONOLACTONE 25 MG: 25 TABLET ORAL at 21:26

## 2025-06-12 RX ADMIN — LORAZEPAM 2 MG: 1 TABLET ORAL at 21:26

## 2025-06-12 RX ADMIN — ACETAMINOPHEN 650 MG: 325 TABLET, FILM COATED ORAL at 20:37

## 2025-06-12 RX ADMIN — LORAZEPAM 2 MG: 1 TABLET ORAL at 11:58

## 2025-06-12 RX ADMIN — PANTOPRAZOLE SODIUM 20 MG: 20 TABLET, DELAYED RELEASE ORAL at 16:45

## 2025-06-12 RX ADMIN — PHENAZOPYRIDINE 200 MG: 100 TABLET ORAL at 11:58

## 2025-06-12 RX ADMIN — APIXABAN 5 MG: 5 TABLET, FILM COATED ORAL at 13:35

## 2025-06-12 RX ADMIN — SUCRALFATE 1 G: 1 TABLET ORAL at 17:04

## 2025-06-12 NOTE — CASE MANAGEMENT
Case Management Assessment & Discharge Planning Note    Patient name Nancy Jones  Location [facility] 2 /E2 -* MRN [id number]  : 1960 Date 2025       Current Admission Date: 6/10/2025  Current Admission Diagnosis:Abdominal wall seroma  Patient Active Problem List    Diagnosis Date Noted    Status post hernia repair 06/10/2025    Status post repair of ventral hernia 2025    Abdominal wall seroma 2025    Pharyngitis due to Streptococcus species 2025    Rib contusion, right, subsequent encounter 2025    Financial difficulties 2024    Shingles 2024    Diabetes due to undrl condition w oth diabetic neuro comp ([facility]) 2024    Folliculitis 2024    Complex tear of lateral meniscus of left knee as current injury, initial encounter 2024    Opioid dependence, uncomplicated ([facility]) 2024    Obesity, morbid ([facility]) 2024    Acute lateral meniscus tear of left knee 2024    Acute pain of left knee 2024    Left facial numbness 2024    Other chest pain 2024    Atherosclerosis of native arteries of extremities with rest pain, bilateral legs ([facility]) 2024    Acute on chronic diastolic (congestive) heart failure ([facility]) 2024    Stage 3 chronic kidney disease, unspecified whether stage 3a or 3b CKD ([facility]) 2023    Dysuria 2023    Omental infarction ([facility]) 2023    Hypokalemia 2023    Night sweats 2023    S/P small bowel resection 2023    Anxiety 2023    Acute postoperative pain 2023    Colitis 2023    Chronic migraine w/o aura w/o status migrainosus, not intractable 2023    IIH (idiopathic intracranial hypertension) 2023    Hematochezia 2023    Left carotid stenosis 2023    Colonic ischemia ([facility]) 2023    Paresthesia 2023    Injury of left facial nerve 2023    Partial facial nerve palsy 2023    Hepatic steatosis 2022     Asymptomatic stenosis of left carotid artery 10/05/2022    Stenosis of left carotid artery 09/06/2022    Carotid artery stenosis 09/02/2022    Appendix disease 04/29/2022    Urinary retention 03/17/2022    Peripheral vascular disease ([facility]) 03/17/2022    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    COPD (chronic obstructive pulmonary disease) ([facility]) 08/19/2021    Unspecified diastolic (congestive) heart failure ([facility]) 03/12/2021    Hypertensive heart disease with congestive heart failure ([facility]) 12/21/2020    CAMILLE (obstructive sleep apnea)     Lumbar radiculopathy 10/01/2020    Wheezing 09/21/2020    A-fib ([facility]) 03/19/2020    Angio-edema 01/22/2020    Gastroesophageal reflux disease without esophagitis 08/12/2019    Allergic reaction 12/03/2018    AMD (age-related macular degeneration), bilateral 11/16/2018    Angina pectoris ([facility]) 11/15/2017    Migraines 10/14/2016    Essential hypertension 08/13/2015      LOS (days): 2  Geometric Mean LOS (GMLOS) (days): 2.9  Days to GMLOS:0.9     OBJECTIVE:    Risk of Unplanned Readmission Score: 31.69         Current admission status: Inpatient       Preferred Pharmacy:   [facility] [id number] [place] [address]  Phone: [phone] Fax: [phone]    [id number] - [place] - [address] [facility]  [address] [facility] [address]  Phone: [phone] Fax: [phone]    Primary Care Provider: Barber Ayoub DO    Primary Insurance: [facility] REP  Secondary Insurance:     ASSESSMENT:  Active Health Care Proxies    There are no active Health Care Proxies on file.                 Readmission Root Cause  30 Day Readmission: No    Patient Information  Admitted from:: Home  Mental Status: Alert  During Assessment patient was accompanied by: Not accompanied during assessment  Assessment information provided by:: Patient  Primary Caregiver: Self  Support Systems: Friend,  Family members  County of Residence: [place]  What [facility] do you live in?: [place]  Type of Current Residence: Apartment  Floor Level: 1  Upon entering residence, is there a bedroom on the main floor (no further steps)?: Yes  Upon entering residence, is there a bathroom on the main floor (no further steps)?: Yes  Living Arrangements: Lives Alone  Is patient a ?: No    Activities of Daily Living Prior to Admission  Functional Status: Independent  Completes ADLs independently?: Yes  Ambulates independently?: Yes (uses walker as needed)  Does patient use assisted devices?: Yes (as needed)  Assisted Devices (DME) used: Straight Cane, Walker  Does patient currently own DME?: Yes  What DME does the patient currently own?: Straight Cane, Walker  Does patient have a history of Outpatient Therapy (PT/OT)?: No  Does the patient have a history of Short-Term Rehab?: No  Does patient have a history of HHC?: Yes  Does patient currently have HHC?: No    Current Home Health Care  [facility]    Patient Information Continued  Income Source: SSI/SSD  Does patient have prescription coverage?: Yes  Can the patient afford their medications and any related supplies (such as glucometers or test strips)?: Yes  Does patient receive dialysis treatments?: No  Does patient have a history of substance abuse?: No  Does patient have a history of Mental Health Diagnosis?: No         Means of Transportation  Means of Transport to Appts:: Drives Self          DISCHARGE DETAILS:    Discharge planning discussed with:: Patient  Freedom of Choice: Yes  Comments - Freedom of Choice: Agreeable to home health for SN, PT, and OT. Referrals placed. Choice list reviewed. Decision made for [facility]  CM contacted family/caregiver?: No- see comments (declined need)  Were Treatment Team discharge recommendations reviewed with patient/caregiver?: Yes  Did patient/caregiver verbalize understanding of patient care needs?: Yes             Requested Home Health Care         Is the patient interested in HHC at discharge?: Yes  Home Health Discipline requested:: Nursing, Physical Therapy, Occupational Therapy  [facility] Health Agency Name:: [facility]  HHA External Referral Reason (only applicable if external HHA name selected): Services not provided in network or near patient location  Home Health Follow-Up Provider:: KARI  Home Health Services Needed:: Evaluate Functional Status and Safety, Gait/ADL Training, Strengthening/Theraputic Exercises to Improve Function, Other (comment) (drain care)  Homebound Criteria Met:: Uses an Assist Device (i.e. cane, walker, etc)  Supporting Clincal Findings:: Limited Endurance    DME Referral Provided  Referral made for DME?: No    Other Referral/Resources/Interventions Provided:  Interventions: HHC  Referral Comments: Referrals placed via aidin         Treatment Team Recommendation: Home with Home Health Care  Expected Discharge Disposition: Home Health Services     Transport at Discharge : Ride Share

## 2025-06-12 NOTE — UTILIZATION REVIEW
Initial Clinical Review - Continued    SEE INITIAL REVIEW AT BOTTOM    Date: 6/12/225                      Day 3    Current Patient Class: Inpatient  Current Level of Care: MS    HPI:65 y.o. female initially admitted on 6/10/2025 for Abnormal wall seroma.       Date: 6/12/25  Day 3: Has surpassed a 2nd midnight with active treatments and services.  s/p 6/11 IR drain placement with 700cc drained. Pt reports pain controlled. Tolerating a little bit of diet, mild nausea, no emesis. Pt also c/o SOB and CP when she gets out of bed. Pt appears in no distress. On exam, abd Soft, protuberant, NT. JENNIFER 980cc serous. Plan: Check CXR, EKG, Troponin. Continue Cardiac diet, Encourage PO intake, OOB. Maintain JENNIFER drain f/u cultures, Zofran PRN, restart home Eliquis. VNA for drain care at discharge.     Medications:   Scheduled Medications:  dexamethasone, 2 mg, Oral, Daily  diltiazem, 240 mg, Oral, Daily  fluticasone-vilanterol, 1 puff, Inhalation, Daily  furosemide, 20 mg, Oral, Every Other Day  furosemide, 40 mg, Oral, Daily  heparin (porcine), 5,000 Units, Subcutaneous, Q8H BONIFACIO  methocarbamol, 1,000 mg, Oral, TID  metoprolol succinate, 100 mg, Oral, BID  pantoprazole, 20 mg, Oral, BID AC  phenazopyridine, 200 mg, Oral, TID With Meals  spironolactone, 25 mg, Oral, BID  sucralfate, 1 g, Oral, 4x Daily    Continuous IV Infusions: NONE     PRN Meds:  acetaminophen, 650 mg, Oral, Q6H PRN- given x2 6/12  dicyclomine, 10 mg, Oral, Q8H PRN  HYDROmorphone, 0.2 mg, Intravenous, Q3H PRN  LORazepam, 2 mg, Oral, Q8H PRN- given x2 6/12  ondansetron, 4 mg, Intravenous, Q6H PRN  ondansetron, 4 mg, Oral, Q8H PRN- given x1 6/12  oxyCODONE, 5 mg, Oral, Q4H PRN  oxyCODONE, 2.5 mg, Oral, Q4H PRN  traMADol, 100 mg, Oral, Q6H PRN        Vital Signs:   Vital Signs (last 3 days)       Date/Time Temp Pulse Resp BP MAP (mmHg) SpO2 Calculated FIO2 (%) - Nasal Cannula Nasal Cannula O2 Flow Rate (L/min) O2 Device Patient Position - Orthostatic VS Fran Coma  Scale Score Pain    06/12/25 1203 -- -- -- -- -- -- -- -- -- -- -- 8 06/12/25 1158 -- -- -- -- -- -- -- -- -- -- -- 8 06/12/25 0822 98.8 °F (37.1 °C) 76 18 114/54 78 90 % -- -- None (Room air) Lying -- --    06/12/25 0815 -- -- -- -- -- -- -- -- -- -- 15 2    06/12/25 0800 98.8 °F (37.1 °C) 76 18 114/54 -- 90 % -- -- -- -- -- --    06/12/25 0445 -- -- -- -- -- -- -- -- -- -- -- 9    06/12/25 0001 -- -- -- -- -- -- -- -- None (Room air) -- 15 No Pain    06/12/25 0000 98.8 °F (37.1 °C) 69 18 111/66 -- 90 % -- -- -- -- -- --    06/11/25 2300 98.8 °F (37.1 °C) 69 18 111/66 74 90 % -- -- None (Room air) Lying -- --    06/11/25 2156 -- -- -- -- -- -- -- -- -- -- -- 7 06/11/25 2100 -- 75 -- 121/65 -- -- -- -- -- -- -- --    06/11/25 1558 -- 83 18 139/58 83 92 % -- -- None (Room air) Lying -- --    06/11/25 0925 -- -- -- -- -- -- -- -- -- -- -- 7 06/11/25 0900 -- -- -- -- -- -- -- -- -- -- 15 1    06/11/25 0758 98.6 °F (37 °C) 69 18 112/54 78 92 % -- -- None (Room air) Lying -- --    06/11/25 0611 -- -- -- -- -- -- -- -- -- -- -- 4    06/11/25 0314 -- -- -- -- -- -- -- -- -- -- -- 10 - Worst Possible Pain    06/10/25 2311 99.5 °F (37.5 °C) 79 18 103/56 74 90 % -- -- None (Room air) Lying -- --    06/10/25 2143 -- -- -- -- -- -- -- -- -- -- -- 9    06/10/25 2022 -- -- -- -- -- -- -- -- -- -- 15 --    06/10/25 1851 97.5 °F (36.4 °C) 103 20 161/86 113 92 % -- -- None (Room air) Sitting -- --    06/10/25 1850 97.5 °F (36.4 °C) 103 20 -- -- 92 % -- -- None (Room air) Lying -- --    06/10/25 1700 -- 70 20 125/59 84 94 % 28 2 L/min Nasal cannula Lying -- --    06/10/25 1600 97.5 °F (36.4 °C) 103 20 161/86 -- 92 % -- -- -- -- -- --    06/10/25 1559 -- 72 20 131/60 -- 92 % -- -- None (Room air) -- -- --    06/10/25 1409 98.9 °F (37.2 °C) 75 20 96/64 -- 97 % -- -- None (Room air) -- -- 6              Pertinent Labs/Diagnostic Results:   Radiology:  CT abdomen pelvis w contrast   Final Interpretation by Marlo Abel  MD Jody (06/10 1620)      Large low-density rim-enhancing fluid collection within the subcutaneous tissues of the anterior abdominal wall measuring up to approximately 20 cm.   Findings most consistent with a postoperative seroma.   Infection not entirely excluded, drainage with fluid analysis advised.      The study was marked in EPIC for immediate notification.         Workstation performed: [id number]         IR drainage tube placement    (Results Pending)   IR drainage tube check/change/reposition/reinsertion/upsize    (Results Pending)   XR chest portable    (Results Pending)     Cardiology:  ECG 12 lead   Final Result by Rafael Wisdom MD (06/12 1149)   Normal sinus rhythm   Left axis deviation   Left ventricular hypertrophy with repolarization abnormality   Abnormal ECG   When compared with ECG of 22-May-2025 10:25,   No significant change was found   Confirmed by Rafael Wisdom ([id number]) on 6/12/2025 11:49:24 AM        Results from last 7 days   Lab Units 06/11/25  0538 06/10/25  1446   WBC Thousand/uL 8.20 9.30   HEMOGLOBIN g/dL 10.7* 11.5   HEMATOCRIT % 33.5* 35.8   PLATELETS Thousands/uL 366 403*   TOTAL NEUT ABS Thousands/µL 5.26 6.82         Results from last 7 days   Lab Units 06/11/25  0538 06/10/25  1446   SODIUM mmol/L 134* 135   POTASSIUM mmol/L 3.4* 3.4*   CHLORIDE mmol/L 96 96   CO2 mmol/L 26 26   ANION GAP mmol/L 12 13   BUN mg/dL 12 14   CREATININE mg/dL 0.84 1.09   EGFR ml/min/1.73sq m 72 52   CALCIUM mg/dL 9.3 9.8   MAGNESIUM mg/dL 2.1  --      Results from last 7 days   Lab Units 06/10/25  1446   AST U/L 12*   ALT U/L 7   ALK PHOS U/L 96   TOTAL PROTEIN g/dL 7.5   ALBUMIN g/dL 4.4   TOTAL BILIRUBIN mg/dL 0.66         Results from last 7 days   Lab Units 06/11/25  0538 06/10/25  1446   GLUCOSE RANDOM mg/dL 88 130         Results from last 7 days   Lab Units 06/10/25  1446   LACTIC ACID mmol/L 1.0     Results from last 7 days   Lab Units 06/11/25  1517   GRAM STAIN RESULT  Rare Disintegrating  polys  No bacteria seen       Network Utilization Review Department  ATTENTION: Please call with any questions or concerns to [phone] and carefully listen to the prompts so that you are directed to the right person. All voicemails are confidential.   For Discharge needs, contact Care Management DC Support Team at [phone] opt. [phone]  Send all requests for admission clinical reviews, approved or denied determinations and any other requests to dedicated fax number below belonging to the campus where the patient is receiving treatment. List of dedicated fax numbers for the Facilities:  FACILITY NAME UR FAX NUMBER   ADMISSION DENIALS (Administrative/Medical Necessity) [phone]   DISCHARGE SUPPORT TEAM (NETWORK) [phone]   PARENT CHILD HEALTH (Maternity/NICU/Pediatrics) [phone]   [facility] [phone]   [facility] [phone]   [facility] [phone]   [facility] [phone]   [facility] [phone]   [facility] [phone]   [facility] [phone]   [facility] [phone]   [facility] [phone]   [facility] [phone]   [facility] [phone]   [facility] [phone]

## 2025-06-12 NOTE — DISCHARGE INSTR - AVS FIRST PAGE
"You will need to follow up with Interventional Radiology upon discharge for a 2 week drain tube check.  Their schedulers will be reaching out to you once you are discharged to make this appointment for you at the location most convenient for you. Please also call and make an appointment to be seen in office by Dr Rankin for continued follow up care.                                                                      TUBE CARE INSTRUCTIONS     Care after your procedure:     Resume your normal diet. Small sips of flat soda will help with nausea.     1. The properly functioning catheter should be forward flushed once (1x) daily with 10ml of normal saline using clean technique. You will be given a prescription for flushes.   To flush the tube, clean both connections with alcohol swab.Twist off the drainage bag/ bulb  tubing and twist the saline syringe into the drainage tube and flush. Remove the syringe and twist the drainage bag / bulb tubing tubing back on.     2. The drainage bag/bulb may be emptied as necessary. Keep a record of the amount of fluid you drain from your tube. This should be done with clean technique as well.      3. A fresh dressing should be applied daily over the tube insertion site.      4. As the tube is secured to the skin with only a suture,try not to pull on your tube. Tub baths are not permitted. Showers are permitted if the patient's skin entry site is prevented from getting wet. Similarly, washcloth \"baths\" are acceptable.      Contact Interventional Radiology at [phone] if:     1. Leakage or large amounts of liquid around the catheter.     2. Fever of 101 degrees lasting several hours without other obvious cause (such as sore throat, flu, etc).     3. Persistent nausea or vomiting.     4. Diminished drainage, which may be associated with pressure or pain. Or when the     drainage from your tube is less than 10mls for 48 hours.     5. Catheter pulled back or falls out.        The " following pharmacies carry the flush syringes.        [facility]                     [facility]                             [facility]  [address]                     [address] [facility]                         [phone]  [place]                       [place]  Phone [phone]            Phone [phone]                                                                                                       [facility] [place]   [facility]             [facility]                                [phone]  [address]                      [address] [place]  [place]                      [facility]  Phone [phone]            Phone [phone]                      [facility]                                                                                                          [phone]  [facility]  [address]  [place]                                                                               [facility]  Phone [phone]     [facility]     Dr. Jewel Rankin MD, FACS     [phone]          1. General: You will feel pulling sensations around the wound or funny aches and pains around the incisions. This is normal. Even minor surgery is a change in your body and this is your body’s way of reacting to it. If you have had abdominal surgery, it may help to support the incision with a small pillow or blanket for comfort when moving or coughing.     2. Wound care:  Okay to shower.  The glue will fall off over the next week or 2.   Use ice for at least the 1st 48 hours.  Do not use for longer than 20 minutes at a time. Use 3 times per day.     3. Water: You may shower over the wounds. Do not bathe or use a pool or hot tub until cleared by the physician.   If you were discharged with a drain, make sure drain site is covered with  plastic wrap before showering.      4. Activity: You may go up and down stairs, walk as much as you are comfortable, but walk at least 3 times each day. If you have had abdominal surgery, do not lift anything heavier than 20 pounds for at least 4 weeks.      5. Diet: You may resume a regular diet. If you had a same-day surgery or overnight stay surgery, you may wish to eat lightly for a few days: soups, crackers, and sandwiches. You may resume a regular diet when ready.      6. Medications: Resume all of your previous medications, unless told otherwise by the doctor. Avoid aspirin products for 2-3 days after the date of surgery. You may, at that time, began to take them again. Use Tylenol and Ibuprofen for pain control.  You may alternate these medications every 3 hours.  For example: you may take Tylenol at noon, Ibuprofen at 3:00 p.m., and Tylenol again at 6:00 p.m., etc. You should use ice to assist with pain control as above.  You do not need to take narcotic pain medication unless you are having significant pain.   If you were prescribed a narcotic pain medication containing Tylenol, such as Percocet or Norco, do not use supplemental Tylenol.      7. Driving: You will need someone to drive you home on the day of surgery or discharge. Do not drive or make any important decisions while on narcotic pain medication or 24 hours and after anesthesia or sedation for surgery. Generally, you may drive when your off all narcotic pain medications and you are comfortable.      8. Upset Stomach: You may take Maalox, Tums, or similar items for an upset stomach. If your narcotic pain medication causes an upset stomach, do not take it on an empty stomach. Try taking it with at least some crackers or toast.      9. Constipation: Patients often experience constipation after surgery. You may take over-the-counter medication for this, such as Metamucil, Senokot, Dulcolax, milk of magnesia, etc. You may take a suppository unless  you have had anorectal surgery such as a procedure on your hemorrhoids. If you experience significant nausea or vomiting after abdominal surgery, call the office before trying any of these medications.     10. Call the office: If you are experiencing any of the following: fevers above 101.5°, significant nausea or vomiting, if the wound develops drainage and/or there is excessive redness around the wound, or if you have significant diarrhea or other worsening symptoms.     11. Pain: You may be given a prescription for pain medication.  This will be sent to your pharmacy prior to discharge.     12. Sexual Activity: You may resume sexual activity when you feel ready and comfortable and your incision is sealed and healed without apparent infection risk.     13. Urination: If you have not urinated in 6 hours, go directly to the ER for evaluation for urinary retention.      14. Follow-up in 2 weeks.

## 2025-06-12 NOTE — PROGRESS NOTES
Progress Note -Interventional Radiology CMA Jones 65 y.o. female MRN: [id number]  Unit/Bed#: E2 -01 Encounter: [id number]    Assessment:    65 year old female with pmh of incisional hernia repair with absorbable mesh 5/5, presenting with abdominal distension, found to have large abdominal wall seroma. S/P IR drain placement 6/11.     IR drain output since placement: 980cc serous    Plan:    -continue to flush drain once daily with 10cc NS.   - patient will be scheduled for two week drain check. Please reach out to IR if output decreases to less than 10cc per day for 2 days  - call IR with any questions/concerns regarding drain.       Subjective: Patient is anxious and tearful during exam. She does state her abdominal distension and decreased. We discussed instructions regarding her IR drain.    Objective:    Vitals:  /54 (BP Location: Left arm)   Pulse 76   Temp 98.8 °F (37.1 °C) (Oral)   Resp 18   Wt 91.6 kg (202 lb)   LMP  (LMP Unknown)   SpO2 90%   BMI 34.67 kg/m²   Body mass index is 34.67 kg/m².  Weight (last 2 days)       Date/Time Weight    06/10/25 1409 91.6 (202)            I/Os:    Intake/Output Summary (Last 24 hours) at 6/12/2025 1112  Last data filed at 6/12/2025 0401  Gross per 24 hour   Intake 240 ml   Output 980 ml   Net -740 ml       Invasive Devices       Peripheral Intravenous Line  Duration             Peripheral IV 06/10/25 Right Antecubital 1 day              Drain  Duration             Abscess Drain RLQ <1 day                    Physical Exam:  General appearance: alert and tearful  Abdomen: soft, non tender to palpation, drain with serous output              Lab Results and Cultures:   CBC with diff:   Lab Results   Component Value Date    WBC 8.20 06/11/2025    HGB 10.7 (L) 06/11/2025    HCT 33.5 (L) 06/11/2025    MCV 89 06/11/2025     06/11/2025    RBC 3.77 (L) 06/11/2025    MCH 28.4 06/11/2025    MCHC 31.9 06/11/2025    RDW 13.0 06/11/2025    MPV 8.7 (L)  06/11/2025    NRBC 0 06/11/2025      BMP/CMP:  Lab Results   Component Value Date     11/13/2017    K 3.4 (L) 06/11/2025    K 4.7 02/22/2022    CL 96 06/11/2025     02/22/2022    CO2 26 06/11/2025    CO2 27 02/22/2022    BUN 12 06/11/2025    BUN 16 02/22/2022    CREATININE 0.84 06/11/2025    CREATININE 0.83 02/22/2022    CALCIUM 9.3 06/11/2025    CALCIUM 9.8 02/22/2022    AST 12 (L) 06/10/2025    AST 55 (H) 02/22/2022    ALT 7 06/10/2025    ALT 36 02/22/2022    ALKPHOS 96 06/10/2025    ALKPHOS 133 (H) 02/22/2022    PROT 7.0 11/13/2017    BILITOT 0.3 11/13/2017    EGFR 72 06/11/2025   ,     Coags:   Lab Results   Component Value Date    PT 12.8 08/19/2021    PTT 40 (H) 05/22/2025    INR 1.64 (H) 05/22/2025    INR 1.0 08/19/2021   ,          Counseling / Coordination of Care  Total time spent today  20 minutes. Greater than 50% of total time was spent with the patient and / or family counseling and / or coordination of care.     This text is generated with voice recognition software. There may be translation, syntax,  or grammatical errors. If you have any questions, please contact the dictating provider.    Linda Ochoa PA-C

## 2025-06-12 NOTE — PLAN OF CARE
Problem: Potential for Falls  Goal: Patient will remain free of falls  Description: INTERVENTIONS:  - Educate patient/family on patient safety including physical limitations  - Instruct patient to call for assistance with activity   - Consider consulting OT/PT to assist with strengthening/mobility based on AM PAC & JH-HLM score  - Consult OT/PT to assist with strengthening/mobility   - Keep Call bell within reach  - Keep bed low and locked with side rails adjusted as appropriate  - Keep care items and personal belongings within reach  - Initiate and maintain comfort rounds  - Make Fall Risk Sign visible to staff  - Offer Toileting every 2 Hours, in advance of need  - Initiate/Maintain bed alarm  - Obtain necessary fall risk management equipment:   - Apply yellow socks and bracelet for high fall risk patients  - Consider moving patient to room near nurses station  Outcome: Progressing     Problem: Nutrition/Hydration-ADULT  Goal: Nutrient/Hydration intake appropriate for improving, restoring or maintaining nutritional needs  Description: Monitor and assess patient's nutrition/hydration status for malnutrition. Collaborate with interdisciplinary team and initiate plan and interventions as ordered.  Monitor patient's weight and dietary intake as ordered or per policy. Utilize nutrition screening tool and intervene as necessary. Determine patient's food preferences and provide high-protein, high-caloric foods as appropriate.     INTERVENTIONS:  - Monitor oral intake, urinary output, labs, and treatment plans  - Assess nutrition and hydration status and recommend course of action  - Evaluate amount of meals eaten  - Assist patient with eating if necessary   - Allow adequate time for meals  - Recommend/ encourage appropriate diets, oral nutritional supplements, and vitamin/mineral supplements  - Order, calculate, and assess calorie counts as needed  - Recommend, monitor, and adjust tube feedings and TPN/PPN based on  assessed needs  - Assess need for intravenous fluids  - Provide specific nutrition/hydration education as appropriate  - Include patient/family/caregiver in decisions related to nutrition  Outcome: Progressing     Problem: PAIN - ADULT  Goal: Verbalizes/displays adequate comfort level or baseline comfort level  Description: Interventions:  - Encourage patient to monitor pain and request assistance  - Assess pain using appropriate pain scale  - Administer analgesics as ordered based on type and severity of pain and evaluate response  - Implement non-pharmacological measures as appropriate and evaluate response  - Consider cultural and social influences on pain and pain management  - Notify physician/advanced practitioner if interventions unsuccessful or patient reports new pain  - Educate patient/family on pain management process including their role and importance of  reporting pain   - Provide non-pharmacologic/complimentary pain relief interventions  Outcome: Progressing     Problem: INFECTION - ADULT  Goal: Absence or prevention of progression during hospitalization  Description: INTERVENTIONS:  - Assess and monitor for signs and symptoms of infection  - Monitor lab/diagnostic results  - Monitor all insertion sites, i.e. indwelling lines, tubes, and drains  - Monitor endotracheal if appropriate and nasal secretions for changes in amount and color  - [place] appropriate cooling/warming therapies per order  - Administer medications as ordered  - Instruct and encourage patient and family to use good hand hygiene technique  - Identify and instruct in appropriate isolation precautions for identified infection/condition  Outcome: Progressing  Goal: Absence of fever/infection during neutropenic period  Description: INTERVENTIONS:  - Monitor WBC  - Perform strict hand hygiene  - Limit to healthy visitors only  - No plants, dried, fresh or silk flowers with south in patient room  Outcome: Progressing     Problem: SAFETY  ADULT  Goal: Patient will remain free of falls  Description: INTERVENTIONS:  - Educate patient/family on patient safety including physical limitations  - Instruct patient to call for assistance with activity   - Consider consulting OT/PT to assist with strengthening/mobility based on AM PAC & JH-HLM score  - Consult OT/PT to assist with strengthening/mobility   - Keep Call bell within reach  - Keep bed low and locked with side rails adjusted as appropriate  - Keep care items and personal belongings within reach  - Initiate and maintain comfort rounds  - Make Fall Risk Sign visible to staff  - Offer Toileting every 2 Hours, in advance of need  - Initiate/Maintain bed alarm  - Obtain necessary fall risk management equipment:   - Apply yellow socks and bracelet for high fall risk patients  - Consider moving patient to room near nurses station  Outcome: Progressing  Goal: Maintain or return to baseline ADL function  Description: INTERVENTIONS:  -  Assess patient's ability to carry out ADLs; assess patient's baseline for ADL function and identify physical deficits which impact ability to perform ADLs (bathing, care of mouth/teeth, toileting, grooming, dressing, etc.)  - Assess/evaluate cause of self-care deficits   - Assess range of motion  - Assess patient's mobility; develop plan if impaired  - Assess patient's need for assistive devices and provide as appropriate  - Encourage maximum independence but intervene and supervise when necessary  - Involve family in performance of ADLs  - Assess for home care needs following discharge   - Consider OT consult to assist with ADL evaluation and planning for discharge  - Provide patient education as appropriate  - Monitor functional capacity and physical performance, use of AM PAC & JH-HLM   - Monitor gait, balance and fatigue with ambulation    Outcome: Progressing  Goal: Maintains/Returns to pre admission functional level  Description: INTERVENTIONS:  - Perform AM-PAC 6 Click  Basic Mobility/ Daily Activity assessment daily.  - Set and communicate daily mobility goal to care team and patient/family/caregiver.   - Collaborate with rehabilitation services on mobility goals if consulted  - Perform Range of Motion 3 times a day.  - Reposition patient every 3 hours.  - Dangle patient 3 times a day  - Stand patient 3 times a day  - Ambulate patient 3 times a day  - Out of bed to chair 3 times a day   - Out of bed for meals 3 times a day  - Out of bed for toileting  - Record patient progress and toleration of activity level   Outcome: Progressing     Problem: DISCHARGE PLANNING  Goal: Discharge to home or other facility with appropriate resources  Description: INTERVENTIONS:  - Identify barriers to discharge w/patient and caregiver  - Arrange for needed discharge resources and transportation as appropriate  - Identify discharge learning needs (meds, wound care, etc.)  - Arrange for interpretive services to assist at discharge as needed  - Refer to Case Management Department for coordinating discharge planning if the patient needs post-hospital services based on physician/advanced practitioner order or complex needs related to functional status, cognitive ability, or social support system  Outcome: Progressing     Problem: Knowledge Deficit  Goal: Patient/family/caregiver demonstrates understanding of disease process, treatment plan, medications, and discharge instructions  Description: Complete learning assessment and assess knowledge base.  Interventions:  - Provide teaching at level of understanding  - Provide teaching via preferred learning methods  Outcome: Progressing     Problem: GASTROINTESTINAL - ADULT  Goal: Minimal or absence of nausea and/or vomiting  Description: INTERVENTIONS:  - Administer IV fluids if ordered to ensure adequate hydration  - Maintain NPO status until nausea and vomiting are resolved  - Nasogastric tube if ordered  - Administer ordered antiemetic medications as  needed  - Provide nonpharmacologic comfort measures as appropriate  - Advance diet as tolerated, if ordered  - Consider nutrition services referral to assist patient with adequate nutrition and appropriate food choices  Outcome: Progressing  Goal: Maintains or returns to baseline bowel function  Description: INTERVENTIONS:  - Assess bowel function  - Encourage oral fluids to ensure adequate hydration  - Administer IV fluids if ordered to ensure adequate hydration  - Administer ordered medications as needed  - Encourage mobilization and activity  - Consider nutritional services referral to assist patient with adequate nutrition and appropriate food choices  Outcome: Progressing  Goal: Maintains adequate nutritional intake  Description: INTERVENTIONS:  - Monitor percentage of each meal consumed  - Identify factors contributing to decreased intake, treat as appropriate  - Assist with meals as needed  - Monitor I&O, weight, and lab values if indicated  - Obtain nutrition services referral as needed  Outcome: Progressing  Goal: Establish and maintain optimal ostomy function  Description: INTERVENTIONS:  - Assess bowel function  - Encourage oral fluids to ensure adequate hydration  - Administer IV fluids if ordered to ensure adequate hydration   - Administer ordered medications as needed  - Encourage mobilization and activity  - Nutrition services referral to assist patient with appropriate food choices  - Assess stoma site  - Consider wound care consult   Outcome: Progressing  Goal: Oral mucous membranes remain intact  Description: INTERVENTIONS  - Assess oral mucosa and hygiene practices  - Implement preventative oral hygiene regimen  - Implement oral medicated treatments as ordered  - Initiate Nutrition services referral as needed  Outcome: Progressing

## 2025-06-12 NOTE — PLAN OF CARE
Problem: Potential for Falls  Goal: Patient will remain free of falls  Description: INTERVENTIONS:  - Educate patient/family on patient safety including physical limitations  - Instruct patient to call for assistance with activity   - Consider consulting OT/PT to assist with strengthening/mobility based on AM PAC & JH-HLM score  - Consult OT/PT to assist with strengthening/mobility   - Keep Call bell within reach  - Keep bed low and locked with side rails adjusted as appropriate  - Keep care items and personal belongings within reach  - Initiate and maintain comfort rounds  - Make Fall Risk Sign visible to staff  - Offer Toileting every *** Hours, in advance of need  - Initiate/Maintain ***alarm  - Obtain necessary fall risk management equipment: ***  - Apply yellow socks and bracelet for high fall risk patients  - Consider moving patient to room near nurses station  Outcome: Progressing     Problem: Nutrition/Hydration-ADULT  Goal: Nutrient/Hydration intake appropriate for improving, restoring or maintaining nutritional needs  Description: Monitor and assess patient's nutrition/hydration status for malnutrition. Collaborate with interdisciplinary team and initiate plan and interventions as ordered.  Monitor patient's weight and dietary intake as ordered or per policy. Utilize nutrition screening tool and intervene as necessary. Determine patient's food preferences and provide high-protein, high-caloric foods as appropriate.     INTERVENTIONS:  - Monitor oral intake, urinary output, labs, and treatment plans  - Assess nutrition and hydration status and recommend course of action  - Evaluate amount of meals eaten  - Assist patient with eating if necessary   - Allow adequate time for meals  - Recommend/ encourage appropriate diets, oral nutritional supplements, and vitamin/mineral supplements  - Order, calculate, and assess calorie counts as needed  - Recommend, monitor, and adjust tube feedings and TPN/PPN based on  assessed needs  - Assess need for intravenous fluids  - Provide specific nutrition/hydration education as appropriate  - Include patient/family/caregiver in decisions related to nutrition  Outcome: Progressing     Problem: PAIN - ADULT  Goal: Verbalizes/displays adequate comfort level or baseline comfort level  Description: Interventions:  - Encourage patient to monitor pain and request assistance  - Assess pain using appropriate pain scale  - Administer analgesics as ordered based on type and severity of pain and evaluate response  - Implement non-pharmacological measures as appropriate and evaluate response  - Consider cultural and social influences on pain and pain management  - Notify physician/advanced practitioner if interventions unsuccessful or patient reports new pain  - Educate patient/family on pain management process including their role and importance of  reporting pain   - Provide non-pharmacologic/complimentary pain relief interventions  Outcome: Progressing     Problem: INFECTION - ADULT  Goal: Absence or prevention of progression during hospitalization  Description: INTERVENTIONS:  - Assess and monitor for signs and symptoms of infection  - Monitor lab/diagnostic results  - Monitor all insertion sites, i.e. indwelling lines, tubes, and drains  - Monitor endotracheal if appropriate and nasal secretions for changes in amount and color  - [place] appropriate cooling/warming therapies per order  - Administer medications as ordered  - Instruct and encourage patient and family to use good hand hygiene technique  - Identify and instruct in appropriate isolation precautions for identified infection/condition  Outcome: Progressing  Goal: Absence of fever/infection during neutropenic period  Description: INTERVENTIONS:  - Monitor WBC  - Perform strict hand hygiene  - Limit to healthy visitors only  - No plants, dried, fresh or silk flowers with south in patient room  Outcome: Progressing     Problem: SAFETY  ADULT  Goal: Patient will remain free of falls  Description: INTERVENTIONS:  - Educate patient/family on patient safety including physical limitations  - Instruct patient to call for assistance with activity   - Consider consulting OT/PT to assist with strengthening/mobility based on AM PAC & JH-HLM score  - Consult OT/PT to assist with strengthening/mobility   - Keep Call bell within reach  - Keep bed low and locked with side rails adjusted as appropriate  - Keep care items and personal belongings within reach  - Initiate and maintain comfort rounds  - Make Fall Risk Sign visible to staff  - Offer Toileting every *** Hours, in advance of need  - Initiate/Maintain ***alarm  - Obtain necessary fall risk management equipment: ***  - Apply yellow socks and bracelet for high fall risk patients  - Consider moving patient to room near nurses station  Outcome: Progressing  Goal: Maintain or return to baseline ADL function  Description: INTERVENTIONS:  -  Assess patient's ability to carry out ADLs; assess patient's baseline for ADL function and identify physical deficits which impact ability to perform ADLs (bathing, care of mouth/teeth, toileting, grooming, dressing, etc.)  - Assess/evaluate cause of self-care deficits   - Assess range of motion  - Assess patient's mobility; develop plan if impaired  - Assess patient's need for assistive devices and provide as appropriate  - Encourage maximum independence but intervene and supervise when necessary  - Involve family in performance of ADLs  - Assess for home care needs following discharge   - Consider OT consult to assist with ADL evaluation and planning for discharge  - Provide patient education as appropriate  - Monitor functional capacity and physical performance, use of AM PAC & JH-HLM   - Monitor gait, balance and fatigue with ambulation    Outcome: Progressing  Goal: Maintains/Returns to pre admission functional level  Description: INTERVENTIONS:  - Perform AM-PAC 6  Click Basic Mobility/ Daily Activity assessment daily.  - Set and communicate daily mobility goal to care team and patient/family/caregiver.   - Collaborate with rehabilitation services on mobility goals if consulted  - Perform Range of Motion *** times a day.  - Reposition patient every *** hours.  - Dangle patient *** times a day  - Stand patient *** times a day  - Ambulate patient *** times a day  - Out of bed to chair *** times a day   - Out of bed for meals *** times a day  - Out of bed for toileting  - Record patient progress and toleration of activity level   Outcome: Progressing     Problem: DISCHARGE PLANNING  Goal: Discharge to home or other facility with appropriate resources  Description: INTERVENTIONS:  - Identify barriers to discharge w/patient and caregiver  - Arrange for needed discharge resources and transportation as appropriate  - Identify discharge learning needs (meds, wound care, etc.)  - Arrange for interpretive services to assist at discharge as needed  - Refer to Case Management Department for coordinating discharge planning if the patient needs post-hospital services based on physician/advanced practitioner order or complex needs related to functional status, cognitive ability, or social support system  Outcome: Progressing     Problem: Knowledge Deficit  Goal: Patient/family/caregiver demonstrates understanding of disease process, treatment plan, medications, and discharge instructions  Description: Complete learning assessment and assess knowledge base.  Interventions:  - Provide teaching at level of understanding  - Provide teaching via preferred learning methods  Outcome: Progressing     Problem: GASTROINTESTINAL - ADULT  Goal: Minimal or absence of nausea and/or vomiting  Description: INTERVENTIONS:  - Administer IV fluids if ordered to ensure adequate hydration  - Maintain NPO status until nausea and vomiting are resolved  - Nasogastric tube if ordered  - Administer ordered antiemetic  medications as needed  - Provide nonpharmacologic comfort measures as appropriate  - Advance diet as tolerated, if ordered  - Consider nutrition services referral to assist patient with adequate nutrition and appropriate food choices  Outcome: Progressing  Goal: Maintains or returns to baseline bowel function  Description: INTERVENTIONS:  - Assess bowel function  - Encourage oral fluids to ensure adequate hydration  - Administer IV fluids if ordered to ensure adequate hydration  - Administer ordered medications as needed  - Encourage mobilization and activity  - Consider nutritional services referral to assist patient with adequate nutrition and appropriate food choices  Outcome: Progressing  Goal: Maintains adequate nutritional intake  Description: INTERVENTIONS:  - Monitor percentage of each meal consumed  - Identify factors contributing to decreased intake, treat as appropriate  - Assist with meals as needed  - Monitor I&O, weight, and lab values if indicated  - Obtain nutrition services referral as needed  Outcome: Progressing  Goal: Establish and maintain optimal ostomy function  Description: INTERVENTIONS:  - Assess bowel function  - Encourage oral fluids to ensure adequate hydration  - Administer IV fluids if ordered to ensure adequate hydration   - Administer ordered medications as needed  - Encourage mobilization and activity  - Nutrition services referral to assist patient with appropriate food choices  - Assess stoma site  - Consider wound care consult   Outcome: Progressing  Goal: Oral mucous membranes remain intact  Description: INTERVENTIONS  - Assess oral mucosa and hygiene practices  - Implement preventative oral hygiene regimen  - Implement oral medicated treatments as ordered  - Initiate Nutrition services referral as needed  Outcome: Progressing

## 2025-06-13 ENCOUNTER — TELEPHONE (OUTPATIENT)
Age: 65
End: 2025-06-13

## 2025-06-13 VITALS
HEART RATE: 71 BPM | TEMPERATURE: 98 F | WEIGHT: 202 LBS | BODY MASS INDEX: 34.67 KG/M2 | SYSTOLIC BLOOD PRESSURE: 126 MMHG | OXYGEN SATURATION: 92 % | RESPIRATION RATE: 18 BRPM | DIASTOLIC BLOOD PRESSURE: 76 MMHG

## 2025-06-13 DIAGNOSIS — J06.9 ACUTE UPPER RESPIRATORY INFECTION: ICD-10-CM

## 2025-06-13 DIAGNOSIS — R06.2 WHEEZING: ICD-10-CM

## 2025-06-13 LAB
ANION GAP SERPL CALCULATED.3IONS-SCNC: 13 MMOL/L (ref 4–13)
BASOPHILS # BLD AUTO: 0.06 THOUSANDS/ÂΜL (ref 0–0.1)
BASOPHILS NFR BLD AUTO: 1 % (ref 0–1)
BUN SERPL-MCNC: 7 MG/DL (ref 5–25)
CALCIUM SERPL-MCNC: 9.5 MG/DL (ref 8.4–10.2)
CHLORIDE SERPL-SCNC: 94 MMOL/L (ref 96–108)
CO2 SERPL-SCNC: 29 MMOL/L (ref 21–32)
CREAT SERPL-MCNC: 0.74 MG/DL (ref 0.6–1.3)
EOSINOPHIL # BLD AUTO: 0.16 THOUSAND/ÂΜL (ref 0–0.61)
EOSINOPHIL NFR BLD AUTO: 2 % (ref 0–6)
ERYTHROCYTE [DISTWIDTH] IN BLOOD BY AUTOMATED COUNT: 12.7 % (ref 11.6–15.1)
GFR SERPL CREATININE-BSD FRML MDRD: 84 ML/MIN/1.73SQ M
GLUCOSE SERPL-MCNC: 106 MG/DL (ref 65–140)
HCT VFR BLD AUTO: 36.6 % (ref 34.8–46.1)
HGB BLD-MCNC: 11.8 G/DL (ref 11.5–15.4)
IMM GRANULOCYTES # BLD AUTO: 0.04 THOUSAND/UL (ref 0–0.2)
IMM GRANULOCYTES NFR BLD AUTO: 1 % (ref 0–2)
LYMPHOCYTES # BLD AUTO: 1.82 THOUSANDS/ÂΜL (ref 0.6–4.47)
LYMPHOCYTES NFR BLD AUTO: 21 % (ref 14–44)
MAGNESIUM SERPL-MCNC: 2 MG/DL (ref 1.9–2.7)
MCH RBC QN AUTO: 28.7 PG (ref 26.8–34.3)
MCHC RBC AUTO-ENTMCNC: 32.2 G/DL (ref 31.4–37.4)
MCV RBC AUTO: 89 FL (ref 82–98)
MONOCYTES # BLD AUTO: 1.12 THOUSAND/ÂΜL (ref 0.17–1.22)
MONOCYTES NFR BLD AUTO: 13 % (ref 4–12)
NEUTROPHILS # BLD AUTO: 5.62 THOUSANDS/ÂΜL (ref 1.85–7.62)
NEUTS SEG NFR BLD AUTO: 62 % (ref 43–75)
NRBC BLD AUTO-RTO: 0 /100 WBCS
PLATELET # BLD AUTO: 398 THOUSANDS/UL (ref 149–390)
PMV BLD AUTO: 9.3 FL (ref 8.9–12.7)
POTASSIUM SERPL-SCNC: 3 MMOL/L (ref 3.5–5.3)
RBC # BLD AUTO: 4.11 MILLION/UL (ref 3.81–5.12)
SODIUM SERPL-SCNC: 136 MMOL/L (ref 135–147)
WBC # BLD AUTO: 8.82 THOUSAND/UL (ref 4.31–10.16)

## 2025-06-13 PROCEDURE — 97163 PT EVAL HIGH COMPLEX 45 MIN: CPT

## 2025-06-13 PROCEDURE — 83735 ASSAY OF MAGNESIUM: CPT

## 2025-06-13 PROCEDURE — 85025 COMPLETE CBC W/AUTO DIFF WBC: CPT

## 2025-06-13 PROCEDURE — 97166 OT EVAL MOD COMPLEX 45 MIN: CPT

## 2025-06-13 PROCEDURE — 99024 POSTOP FOLLOW-UP VISIT: CPT | Performed by: SPECIALIST

## 2025-06-13 PROCEDURE — 80048 BASIC METABOLIC PNL TOTAL CA: CPT

## 2025-06-13 PROCEDURE — NC001 PR NO CHARGE: Performed by: SPECIALIST

## 2025-06-13 RX ORDER — POTASSIUM CHLORIDE 1500 MG/1
40 TABLET, EXTENDED RELEASE ORAL ONCE
Status: COMPLETED | OUTPATIENT
Start: 2025-06-13 | End: 2025-06-13

## 2025-06-13 RX ADMIN — DILTIAZEM HYDROCHLORIDE 240 MG: 240 CAPSULE, EXTENDED RELEASE ORAL at 08:36

## 2025-06-13 RX ADMIN — METHOCARBAMOL 1000 MG: 500 TABLET ORAL at 08:36

## 2025-06-13 RX ADMIN — PANTOPRAZOLE SODIUM 20 MG: 20 TABLET, DELAYED RELEASE ORAL at 05:38

## 2025-06-13 RX ADMIN — ACETAMINOPHEN 650 MG: 325 TABLET, FILM COATED ORAL at 04:04

## 2025-06-13 RX ADMIN — ONDANSETRON 4 MG: 2 INJECTION INTRAMUSCULAR; INTRAVENOUS at 04:04

## 2025-06-13 RX ADMIN — FUROSEMIDE 20 MG: 20 TABLET ORAL at 08:37

## 2025-06-13 RX ADMIN — PHENAZOPYRIDINE 200 MG: 100 TABLET ORAL at 08:36

## 2025-06-13 RX ADMIN — LORAZEPAM 2 MG: 1 TABLET ORAL at 12:38

## 2025-06-13 RX ADMIN — SPIRONOLACTONE 25 MG: 25 TABLET ORAL at 08:37

## 2025-06-13 RX ADMIN — PANTOPRAZOLE SODIUM 20 MG: 20 TABLET, DELAYED RELEASE ORAL at 16:51

## 2025-06-13 RX ADMIN — DEXAMETHASONE 2 MG: 4 TABLET ORAL at 08:37

## 2025-06-13 RX ADMIN — FLUTICASONE FUROATE AND VILANTEROL TRIFENATATE 1 PUFF: 200; 25 POWDER RESPIRATORY (INHALATION) at 08:40

## 2025-06-13 RX ADMIN — APIXABAN 5 MG: 5 TABLET, FILM COATED ORAL at 08:37

## 2025-06-13 RX ADMIN — FUROSEMIDE 40 MG: 40 TABLET ORAL at 08:37

## 2025-06-13 RX ADMIN — SUCRALFATE 1 G: 1 TABLET ORAL at 08:37

## 2025-06-13 RX ADMIN — ACETAMINOPHEN 650 MG: 325 TABLET, FILM COATED ORAL at 12:37

## 2025-06-13 RX ADMIN — SUCRALFATE 1 G: 1 TABLET ORAL at 12:26

## 2025-06-13 RX ADMIN — METOPROLOL SUCCINATE 100 MG: 100 TABLET, EXTENDED RELEASE ORAL at 08:37

## 2025-06-13 RX ADMIN — TRAMADOL HYDROCHLORIDE 100 MG: 50 TABLET, COATED ORAL at 12:38

## 2025-06-13 RX ADMIN — METHOCARBAMOL 1000 MG: 500 TABLET ORAL at 16:51

## 2025-06-13 RX ADMIN — LORAZEPAM 2 MG: 1 TABLET ORAL at 04:18

## 2025-06-13 RX ADMIN — POTASSIUM CHLORIDE 40 MEQ: 1500 TABLET, EXTENDED RELEASE ORAL at 08:36

## 2025-06-13 RX ADMIN — TRAMADOL HYDROCHLORIDE 100 MG: 50 TABLET, COATED ORAL at 04:04

## 2025-06-13 NOTE — OCCUPATIONAL THERAPY NOTE
"    Occupational Therapy Evaluation     Patient Name: Nancy Jones  Today's Date: 6/13/2025  Problem List  Active Problems:    Abdominal wall seroma    Past Medical History  Past Medical History[1]  Past Surgical History  Past Surgical History[2]        06/13/25 1023   OT Last Visit   OT Visit Date 06/13/25   Note Type   Note type Evaluation   Pain Assessment   Pain Assessment Tool 0-10   Pain Score 5   Pain Location/Orientation Location: Abdomen;Orientation: Right   Hospital Pain Intervention(s) Repositioned;Ambulation/increased activity;Emotional support   Restrictions/Precautions   Weight Bearing Precautions Per Order No   Other Precautions Chair Alarm;Bed Alarm;Multiple lines;Fall Risk;Pain   Home Living   Type of Home Apartment   Home Layout One level;Performs ADLs on one level  (Pt reports 7 XIOMARA in the front but uses the back door with 0 XIOMARA)   Bathroom Shower/Tub Walk-in shower   Bathroom Toilet Standard   Bathroom Equipment Shower chair   Home Equipment Walker;Cane  (Pt reports using the walker last week; walking stick. Issues cane today, 6/13.)   Prior Function   Level of [place] Independent with ADLs;Independent with functional mobility;Independent with IADLS   Lives With Alone   Receives Help From Home health   IADLs Independent with driving;Independent with meal prep;Independent with medication management   Falls in the last 6 months 1 to 4   Vocational Retired   Lifestyle   Autonomy Patient lives alone in a one-level apartment. Has a walk-in shower, standard toilet and shower chair. Pt reports receiving Home OT. PTA, patient was (I) with ADLs, was (I) with IADLs, and was (I) with functional transfers/mobility with walker PRN.   General   Additional Pertinent History Co-morbidities affecting patient's functional performance at time of evaluation include: SOB, CP   Family/Caregiver Present No   Subjective   Subjective \"I like having the cane with me\"   ADL   Where Assessed Edge of bed   Eating " Assistance 7  Independent   Grooming Assistance 6  Modified Independent   UB Bathing Assistance 5  Supervision/Setup   LB Bathing Assistance 5  Supervision/Setup   UB Dressing Assistance 5  Supervision/Setup   LB Dressing Assistance 5  Supervision/Setup   Toileting Assistance  5  Supervision/Setup   Bed Mobility   Supine to Sit 6  Modified independent   Additional items HOB elevated;Bedrails   Transfers   Sit to Stand 6  Modified independent   Additional items Bedrails;Increased time required  (SPC)   Stand to Sit 6  Modified independent   Additional items Armrests;Increased time required  (SPC)   Functional Mobility   Functional Mobility 5  Supervision   Additional Comments Ax1, household distances. Walked with/without SPC.   Additional items SPC   Balance   Static Sitting Normal   Dynamic Sitting Good   Static Standing Fair +   Dynamic Standing Fair   Ambulatory Fair   Activity Tolerance   Activity Tolerance Patient tolerated treatment well;Patient limited by pain   Medical Staff Made Aware Tunde PT   Nurse Made Aware yes   RUE Assessment   RUE Assessment WFL   LUE Assessment   LUE Assessment WFL   Hand Function   Gross Motor Coordination Functional   Fine Motor Coordination Functional   Vision-Basic Assessment   Current Vision   (Wears glasses occasionally and when driving)   Vision - Complex Assessment   Ocular Range of Motion Intact   Psychosocial   Psychosocial (WDL) WDL   Perception   Inattention/Neglect Appears intact   Motor Planning Appears intact   Cognition   Overall Cognitive Status WFL   Arousal/Participation Alert;Responsive;Cooperative   Attention Within functional limits   Orientation Level Oriented X4   Memory Within functional limits   Following Commands Follows all commands and directions without difficulty   Assessment   Prognosis Good   Assessment Patient is a 65 y.o. female seen for OT evaluation s/p admit to [facility] on 6/10/2025 with abdominal wall seroma, s/p incisional site  hernia repair with absorbable mesh on 5/5/2025 and now s/p IR drain placement on 6/11/2025. Patient demonstrates the following deficits impacting occupational performance: weakness, decreased strength , decreased balance, decreased activity tolerance, decreased safety awareness, increased pain, nausea, SOB, increased body habitus , and impaired coordination .OT order placed to assess patient's ADLs, cognitive status, functional mobility and performance in order to maximize safety and independence while making d/c recommendations that are most appropriate for the patient. Patient has activity order that includes Activity as tolerated, OOB to chair. Personal factors impacting patient at time of initial evaluation include: limited home support, difficulty performing ADLs, difficulty performing IADLs, difficulty performing transfers/mobility, fall risk , functional decline , and increased reliance on DME . At time of eval patient's cognitive status was A&Ox4. Patient's current functional status is: Supervision UB ADLs Supervision LB ADLs, Supervision toileting, Modified Independent transfers/functional mobility. Pt does not require OT post-acute rehabilitation due to being close to baseline. Pt would benefit from restorative therapy during hospitalization. Patient was left seated in chair with alarm on and all needs within reach.   Goals   Patient Goals To go back home   Plan   OT Treatment Day 0   OT Frequency Eval only   Discharge Recommendation   Rehab Resource Intensity Level, OT No post-acute rehabilitation needs   AM-PAC Daily Activity Inpatient   Lower Body Dressing 3   Bathing 3   Toileting 3   Upper Body Dressing 4   Grooming 4   Eating 4   Daily Activity Raw Score 21   Daily Activity Standardized Score (Calc for Raw Score >=11) 44.27   AM-PAC Applied Cognition Inpatient   Following a Speech/Presentation 4   Understanding Ordinary Conversation 4   Taking Medications 4   Remembering Where Things Are Placed or  "Put Away 4   Remembering List of 4-5 Errands 4   Taking Care of Complicated Tasks 4   Applied Cognition Raw Score 24   Applied Cognition Standardized Score 62.21   Yulissa Strickland         [1]   Past Medical History:  Diagnosis Date    A-fib (HCC) 03/2020    Allergic     Anxiety     Arthritis     Asthma     Back pain     and muscle pain    Bruises easily     Chronic pain disorder     Interstitial pain    Colon polyp     Dental bridge present     Depression     Eczema     GERD (gastroesophageal reflux disease)     Heart murmur     History of blood clots     per pt \"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\"    History of pneumonia     Hives     Hyperlipidemia     Hypertension     Infertility, female     Interstitial cystitis     Migraine     sees neurologist    Motion sickness     Obesity     Palpitations     PONV (postoperative nausea and vomiting)     Premature atrial contractions 11/09/2022    Psychiatric disorder     Sleep apnea     TIA (transient ischemic attack) 09/2022    per pt --\"had MRI and 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\"    Urinary incontinence     wears Pads    Venous insufficiency 08/01/2017    Wears glasses    [2]   Past Surgical History:  Procedure Laterality Date    COLONOSCOPY      DILATION AND CURETTAGE OF UTERUS      EGD      EXPLORATORY LAPAROTOMY      for infertility    EXPLORATORY LAPAROTOMY W/ BOWEL RESECTION N/A 7/19/2023    Procedure: LAPAROTOMY EXPLORATORY W/ BOWEL RESECTION;  Surgeon: Crista Recinos MD;  [facility];  Service: General    HAND SURGERY      Hand excision of tendon cyst    MT LAPAROSCOPIC APPENDECTOMY N/A 05/03/2022    Procedure: APPENDECTOMY LAPAROSCOPIC;  Surgeon: Vin Fields MD;  [facility];  Service: General    MT LAPS ABD PRTM&OMENTUM DX W/WO SPEC BR/WA SPX N/A 7/19/2023    Procedure: LAPAROSCOPY DIAGNOSTIC, LYSIS OF ADHESIONS, LAPAROSCOPY TAKE TAKEDOWN OF LEFT COLON, EXPLORATORY LAPAROSCOPY, LYSIS OF ADHESIONS, RESECTION OF " TRANSVERSE COLON SPLENIC FLEXURE AND DESCENDING COLON , TAKE DOWN OF SPLENIC FLEXURE, SIGMOIDOSCOPY, MOBILIZATION OF RIGHT COLON, PRIMARY ANASTOMOSIS EEA 29, TAP BLOCK;  Surgeon: Crista Recinos MD;  [facility];  Service: General    TN RPR AA HERNIA RECR > 10 CM REDUCIBLE N/A 5/5/2025    Procedure: REPAIR HERNIA INCISIONAL 20 cm, retro rectus repair with Phasic mesh.  Unilateral myofascial release external oblique component seperation. TAP block;  Surgeon: Jewel Rankin MD;  [facility];  Service: General    TN TCAT IV STENT CRV CRTD ART EMBOLIC PROTECJ Left 4/29/2024    Procedure: ANGIOPLASTY ARTERY CAROTID W/ STENT;  Surgeon: Roberto García DO;  [facility];  Service: Vascular    TN TEAEC W/PATCH GRF CAROTID VERTB SUBCLAV NECK INC Left 1/31/2023    Procedure: EXPLORATION OF LEFT CAROTID ARTERY; ABORTED ENDARTERECTOMY ARTERY CAROTID;  Surgeon: Roberto García DO;  [facility];  Service: Vascular    SUPERIOR MESTENTERIC ARTERY STENT      WISDOM TOOTH EXTRACTION

## 2025-06-13 NOTE — CASE MANAGEMENT
Case Management Discharge Planning Note    Patient name Nancy Jones  Location [facility] /E2 -* MRN [id number]  : 1960 Date 2025       Current Admission Date: 6/10/2025  Current Admission Diagnosis:Abdominal wall seroma  Patient Active Problem List    Diagnosis Date Noted    Status post hernia repair 06/10/2025    Status post repair of ventral hernia 2025    Abdominal wall seroma 2025    Pharyngitis due to Streptococcus species 2025    Rib contusion, right, subsequent encounter 2025    Financial difficulties 2024    Shingles 2024    Diabetes due to undrl condition w oth diabetic neuro comp ([facility]) 2024    Folliculitis 2024    Complex tear of lateral meniscus of left knee as current injury, initial encounter 2024    Opioid dependence, uncomplicated ([facility]) 2024    Obesity, morbid ([facility]) 2024    Acute lateral meniscus tear of left knee 2024    Acute pain of left knee 2024    Left facial numbness 2024    Other chest pain 2024    Atherosclerosis of native arteries of extremities with rest pain, bilateral legs ([facility]) 2024    Acute on chronic diastolic (congestive) heart failure ([facility]) 2024    Stage 3 chronic kidney disease, unspecified whether stage 3a or 3b CKD ([facility]) 2023    Dysuria 2023    Omental infarction ([facility]) 2023    Hypokalemia 2023    Night sweats 2023    S/P small bowel resection 2023    Anxiety 2023    Acute postoperative pain 2023    Colitis 2023    Chronic migraine w/o aura w/o status migrainosus, not intractable 2023    IIH (idiopathic intracranial hypertension) 2023    Hematochezia 2023    Left carotid stenosis 2023    Colonic ischemia (HCC) 2023    Paresthesia 2023    Injury of left facial nerve 2023    Partial facial nerve palsy 2023    Hepatic steatosis 2022    Asymptomatic  stenosis of left carotid artery 10/05/2022    Stenosis of left carotid artery 09/06/2022    Carotid artery stenosis 09/02/2022    Appendix disease 04/29/2022    Urinary retention 03/17/2022    Peripheral vascular disease (HCC) 03/17/2022    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    COPD (chronic obstructive pulmonary disease) (HCC) 08/19/2021    Unspecified diastolic (congestive) heart failure (HCC) 03/12/2021    Hypertensive heart disease with congestive heart failure ([facility]) 12/21/2020    CAMILLE (obstructive sleep apnea)     Lumbar radiculopathy 10/01/2020    Wheezing 09/21/2020    A-fib (HCC) 03/19/2020    Angio-edema 01/22/2020    Gastroesophageal reflux disease without esophagitis 08/12/2019    Allergic reaction 12/03/2018    AMD (age-related macular degeneration), bilateral 11/16/2018    Angina pectoris ([facility]) 11/15/2017    Migraines 10/14/2016    Essential hypertension 08/13/2015      LOS (days): 3  Geometric Mean LOS (GMLOS) (days): 2.9  Days to GMLOS:0     OBJECTIVE:  Risk of Unplanned Readmission Score: 29.75         Current admission status: Inpatient   Preferred Pharmacy:   [facility] [id number] [place] [address]  Phone: [phone] Fax: [phone]    [facility] [id number] [facility] [place] - [address] [facility]  [address]  [facility] [address]  Phone: [phone] Fax: [phone]    Primary Care Provider: Barber Ayoub DO    Primary Insurance: [facility] REP  Secondary Insurance:     DISCHARGE DETAILS:    Discharge planning discussed with:: Patient  Freedom of Choice: Yes  Comments - Freedom of Choice: Agreeable to discharge to home today with [facility] for PT/OT/SN  CM contacted family/caregiver?: No- see comments (declined need)  Were Treatment Team discharge recommendations reviewed with patient/caregiver?: Yes  Did patient/caregiver verbalize understanding of patient care needs?: Yes             Requested Home Health Care         Is the patient interested in HHC at discharge?: Yes  Home Health Discipline requested:: Nursing, Occupational Therapy, Physical Therapy  Home Health Agency Name:: [facility]  HHA External Referral Reason (only applicable if external HHA name selected): Services not provided in network or near patient location  Home Health Follow-Up Provider:: KARI  Home Health Services Needed:: Evaluate Functional Status and Safety, Gait/ADL Training, Strengthening/Theraputic Exercises to Improve Function, Other (comment) (drain care)  Homebound Criteria Met:: Uses an Assist Device (i.e. cane, walker, etc)  Supporting Clincal Findings:: Limited Endurance    DME Referral Provided  Referral made for DME?: No    Other Referral/Resources/Interventions Provided:  Interventions: HHC         Treatment Team Recommendation: Home with Home Health Care  Expected Discharge Disposition: Home Health Services                                   IMM Given (Date):: 06/13/25  IMM Given to:: Patient  IMM was reviewed with the pt. Pt reports understanding of the ability to appeal discharge if feeling as though not medically stable for discharge. IMM was signed and placed in the scan bin.

## 2025-06-13 NOTE — TELEPHONE ENCOUNTER
Auth needed for Fluticasone-Vilanterol 200-25mcg/act. Inhale 1 puff daily. Dispense 200 blisters. Dx: J06.2, J06.9.    Stable = 7/23/2018  T/F: xopenex hfa, advair, trelegy    PRISCILA Key: [id number]  Prescribed by Dr Mega Grant P: [id number] F: [id number]

## 2025-06-13 NOTE — PLAN OF CARE
Problem: Potential for Falls  Goal: Patient will remain free of falls  Description: INTERVENTIONS:  - Educate patient/family on patient safety including physical limitations  - Instruct patient to call for assistance with activity   - Consider consulting OT/PT to assist with strengthening/mobility based on AM PAC & JH-HLM score  - Consult OT/PT to assist with strengthening/mobility   - Keep Call bell within reach  - Keep bed low and locked with side rails adjusted as appropriate  - Keep care items and personal belongings within reach  - Initiate and maintain comfort rounds  - Make Fall Risk Sign visible to staff  - Offer Toileting every 2 Hours, in advance of need  - Initiate/Maintain bed alarm  - Obtain necessary fall risk management equipment: bed alarm,  socks  - Apply yellow socks and bracelet for high fall risk patients  - Consider moving patient to room near nurses station  Outcome: Progressing     Problem: Nutrition/Hydration-ADULT  Goal: Nutrient/Hydration intake appropriate for improving, restoring or maintaining nutritional needs  Description: Monitor and assess patient's nutrition/hydration status for malnutrition. Collaborate with interdisciplinary team and initiate plan and interventions as ordered.  Monitor patient's weight and dietary intake as ordered or per policy. Utilize nutrition screening tool and intervene as necessary. Determine patient's food preferences and provide high-protein, high-caloric foods as appropriate.     INTERVENTIONS:  - Monitor oral intake, urinary output, labs, and treatment plans  - Assess nutrition and hydration status and recommend course of action  - Evaluate amount of meals eaten  - Assist patient with eating if necessary   - Allow adequate time for meals  - Recommend/ encourage appropriate diets, oral nutritional supplements, and vitamin/mineral supplements  - Order, calculate, and assess calorie counts as needed  - Recommend, monitor, and adjust tube feedings and  TPN/PPN based on assessed needs  - Assess need for intravenous fluids  - Provide specific nutrition/hydration education as appropriate  - Include patient/family/caregiver in decisions related to nutrition  Outcome: Progressing     Problem: PAIN - ADULT  Goal: Verbalizes/displays adequate comfort level or baseline comfort level  Description: Interventions:  - Encourage patient to monitor pain and request assistance  - Assess pain using appropriate pain scale  - Administer analgesics as ordered based on type and severity of pain and evaluate response  - Implement non-pharmacological measures as appropriate and evaluate response  - Consider cultural and social influences on pain and pain management  - Notify physician/advanced practitioner if interventions unsuccessful or patient reports new pain  - Educate patient/family on pain management process including their role and importance of  reporting pain   - Provide non-pharmacologic/complimentary pain relief interventions  Outcome: Progressing     Problem: INFECTION - ADULT  Goal: Absence or prevention of progression during hospitalization  Description: INTERVENTIONS:  - Assess and monitor for signs and symptoms of infection  - Monitor lab/diagnostic results  - Monitor all insertion sites, i.e. indwelling lines, tubes, and drains  - Monitor endotracheal if appropriate and nasal secretions for changes in amount and color  - [place] appropriate cooling/warming therapies per order  - Administer medications as ordered  - Instruct and encourage patient and family to use good hand hygiene technique  - Identify and instruct in appropriate isolation precautions for identified infection/condition  Outcome: Progressing  Goal: Absence of fever/infection during neutropenic period  Description: INTERVENTIONS:  - Monitor WBC  - Perform strict hand hygiene  - Limit to healthy visitors only  - No plants, dried, fresh or silk flowers with south in patient room  Outcome: Progressing      Problem: SAFETY ADULT  Goal: Patient will remain free of falls  Description: INTERVENTIONS:  - Educate patient/family on patient safety including physical limitations  - Instruct patient to call for assistance with activity   - Consider consulting OT/PT to assist with strengthening/mobility based on AM PAC & JH-HLM score  - Consult OT/PT to assist with strengthening/mobility   - Keep Call bell within reach  - Keep bed low and locked with side rails adjusted as appropriate  - Keep care items and personal belongings within reach  - Initiate and maintain comfort rounds  - Make Fall Risk Sign visible to staff  - Offer Toileting every 2 Hours, in advance of need  - Initiate/Maintain bed alarm  - Obtain necessary fall risk management equipment: bed alarm,  socks  - Apply yellow socks and bracelet for high fall risk patients  - Consider moving patient to room near nurses station  Outcome: Progressing  Goal: Maintain or return to baseline ADL function  Description: INTERVENTIONS:  -  Assess patient's ability to carry out ADLs; assess patient's baseline for ADL function and identify physical deficits which impact ability to perform ADLs (bathing, care of mouth/teeth, toileting, grooming, dressing, etc.)  - Assess/evaluate cause of self-care deficits   - Assess range of motion  - Assess patient's mobility; develop plan if impaired  - Assess patient's need for assistive devices and provide as appropriate  - Encourage maximum independence but intervene and supervise when necessary  - Involve family in performance of ADLs  - Assess for home care needs following discharge   - Consider OT consult to assist with ADL evaluation and planning for discharge  - Provide patient education as appropriate  - Monitor functional capacity and physical performance, use of AM PAC & JH-HLM   - Monitor gait, balance and fatigue with ambulation    Outcome: Progressing  Goal: Maintains/Returns to pre admission functional level  Description:  INTERVENTIONS:  - Perform AM-PAC 6 Click Basic Mobility/ Daily Activity assessment daily.  - Set and communicate daily mobility goal to care team and patient/family/caregiver.   - Collaborate with rehabilitation services on mobility goals if consulted  - Perform Range of Motion 3 times a day.  - Reposition patient every 3 hours.  - Dangle patient 3 times a day  - Stand patient 3 times a day  - Ambulate patient 3 times a day  - Out of bed to chair 3 times a day   - Out of bed for meals 3 times a day  - Out of bed for toileting  - Record patient progress and toleration of activity level   Outcome: Progressing     Problem: DISCHARGE PLANNING  Goal: Discharge to home or other facility with appropriate resources  Description: INTERVENTIONS:  - Identify barriers to discharge w/patient and caregiver  - Arrange for needed discharge resources and transportation as appropriate  - Identify discharge learning needs (meds, wound care, etc.)  - Arrange for interpretive services to assist at discharge as needed  - Refer to Case Management Department for coordinating discharge planning if the patient needs post-hospital services based on physician/advanced practitioner order or complex needs related to functional status, cognitive ability, or social support system  Outcome: Progressing     Problem: Knowledge Deficit  Goal: Patient/family/caregiver demonstrates understanding of disease process, treatment plan, medications, and discharge instructions  Description: Complete learning assessment and assess knowledge base.  Interventions:  - Provide teaching at level of understanding  - Provide teaching via preferred learning methods  Outcome: Progressing     Problem: GASTROINTESTINAL - ADULT  Goal: Minimal or absence of nausea and/or vomiting  Description: INTERVENTIONS:  - Administer IV fluids if ordered to ensure adequate hydration  - Maintain NPO status until nausea and vomiting are resolved  - Nasogastric tube if ordered  -  Administer ordered antiemetic medications as needed  - Provide nonpharmacologic comfort measures as appropriate  - Advance diet as tolerated, if ordered  - Consider nutrition services referral to assist patient with adequate nutrition and appropriate food choices  Outcome: Progressing  Goal: Maintains or returns to baseline bowel function  Description: INTERVENTIONS:  - Assess bowel function  - Encourage oral fluids to ensure adequate hydration  - Administer IV fluids if ordered to ensure adequate hydration  - Administer ordered medications as needed  - Encourage mobilization and activity  - Consider nutritional services referral to assist patient with adequate nutrition and appropriate food choices  Outcome: Progressing  Goal: Maintains adequate nutritional intake  Description: INTERVENTIONS:  - Monitor percentage of each meal consumed  - Identify factors contributing to decreased intake, treat as appropriate  - Assist with meals as needed  - Monitor I&O, weight, and lab values if indicated  - Obtain nutrition services referral as needed  Outcome: Progressing  Goal: Establish and maintain optimal ostomy function  Description: INTERVENTIONS:  - Assess bowel function  - Encourage oral fluids to ensure adequate hydration  - Administer IV fluids if ordered to ensure adequate hydration   - Administer ordered medications as needed  - Encourage mobilization and activity  - Nutrition services referral to assist patient with appropriate food choices  - Assess stoma site  - Consider wound care consult   Outcome: Progressing  Goal: Oral mucous membranes remain intact  Description: INTERVENTIONS  - Assess oral mucosa and hygiene practices  - Implement preventative oral hygiene regimen  - Implement oral medicated treatments as ordered  - Initiate Nutrition services referral as needed  Outcome: Progressing

## 2025-06-13 NOTE — RESTORATIVE TECHNICIAN NOTE
Restorative Technician Note      Patient Name: Nancy Jones     Restorative Tech Visit Date: 06/13/25  Note Type: Mobility  Patient Position Upon Consult: Supine  Activity Performed: Ambulated  Assistive Device: Cane  Patient Position at End of Consult: All needs within reach; Supine

## 2025-06-13 NOTE — PLAN OF CARE
Problem: Potential for Falls  Goal: Patient will remain free of falls  Description: INTERVENTIONS:  - Educate patient/family on patient safety including physical limitations  - Instruct patient to call for assistance with activity   - Consider consulting OT/PT to assist with strengthening/mobility based on AM PAC & JH-HLM score  - Consult OT/PT to assist with strengthening/mobility   - Keep Call bell within reach  - Keep bed low and locked with side rails adjusted as appropriate  - Keep care items and personal belongings within reach  - Initiate and maintain comfort rounds  - Make Fall Risk Sign visible to staff  - Offer Toileting every 2 Hours, in advance of need  - Initiate/Maintain bed alarm  - Obtain necessary fall risk management equipment: bed alarm,  socks  - Apply yellow socks and bracelet for high fall risk patients  - Consider moving patient to room near nurses station  Outcome: Progressing     Problem: Nutrition/Hydration-ADULT  Goal: Nutrient/Hydration intake appropriate for improving, restoring or maintaining nutritional needs  Description: Monitor and assess patient's nutrition/hydration status for malnutrition. Collaborate with interdisciplinary team and initiate plan and interventions as ordered.  Monitor patient's weight and dietary intake as ordered or per policy. Utilize nutrition screening tool and intervene as necessary. Determine patient's food preferences and provide high-protein, high-caloric foods as appropriate.     INTERVENTIONS:  - Monitor oral intake, urinary output, labs, and treatment plans  - Assess nutrition and hydration status and recommend course of action  - Evaluate amount of meals eaten  - Assist patient with eating if necessary   - Allow adequate time for meals  - Recommend/ encourage appropriate diets, oral nutritional supplements, and vitamin/mineral supplements  - Order, calculate, and assess calorie counts as needed  - Recommend, monitor, and adjust tube feedings and  TPN/PPN based on assessed needs  - Assess need for intravenous fluids  - Provide specific nutrition/hydration education as appropriate  - Include patient/family/caregiver in decisions related to nutrition  Outcome: Progressing     Problem: PAIN - ADULT  Goal: Verbalizes/displays adequate comfort level or baseline comfort level  Description: Interventions:  - Encourage patient to monitor pain and request assistance  - Assess pain using appropriate pain scale  - Administer analgesics as ordered based on type and severity of pain and evaluate response  - Implement non-pharmacological measures as appropriate and evaluate response  - Consider cultural and social influences on pain and pain management  - Notify physician/advanced practitioner if interventions unsuccessful or patient reports new pain  - Educate patient/family on pain management process including their role and importance of  reporting pain   - Provide non-pharmacologic/complimentary pain relief interventions  Outcome: Progressing     Problem: INFECTION - ADULT  Goal: Absence or prevention of progression during hospitalization  Description: INTERVENTIONS:  - Assess and monitor for signs and symptoms of infection  - Monitor lab/diagnostic results  - Monitor all insertion sites, i.e. indwelling lines, tubes, and drains  - Monitor endotracheal if appropriate and nasal secretions for changes in amount and color  - [place] appropriate cooling/warming therapies per order  - Administer medications as ordered  - Instruct and encourage patient and family to use good hand hygiene technique  - Identify and instruct in appropriate isolation precautions for identified infection/condition  Outcome: Progressing  Goal: Absence of fever/infection during neutropenic period  Description: INTERVENTIONS:  - Monitor WBC  - Perform strict hand hygiene  - Limit to healthy visitors only  - No plants, dried, fresh or silk flowers with south in patient room  Outcome: Progressing      Problem: SAFETY ADULT  Goal: Patient will remain free of falls  Description: INTERVENTIONS:  - Educate patient/family on patient safety including physical limitations  - Instruct patient to call for assistance with activity   - Consider consulting OT/PT to assist with strengthening/mobility based on AM PAC & JH-HLM score  - Consult OT/PT to assist with strengthening/mobility   - Keep Call bell within reach  - Keep bed low and locked with side rails adjusted as appropriate  - Keep care items and personal belongings within reach  - Initiate and maintain comfort rounds  - Make Fall Risk Sign visible to staff  - Offer Toileting every 2 Hours, in advance of need  - Initiate/Maintain bed alarm  - Obtain necessary fall risk management equipment: bed alarm,  socks  - Apply yellow socks and bracelet for high fall risk patients  - Consider moving patient to room near nurses station  Outcome: Progressing  Goal: Maintain or return to baseline ADL function  Description: INTERVENTIONS:  -  Assess patient's ability to carry out ADLs; assess patient's baseline for ADL function and identify physical deficits which impact ability to perform ADLs (bathing, care of mouth/teeth, toileting, grooming, dressing, etc.)  - Assess/evaluate cause of self-care deficits   - Assess range of motion  - Assess patient's mobility; develop plan if impaired  - Assess patient's need for assistive devices and provide as appropriate  - Encourage maximum independence but intervene and supervise when necessary  - Involve family in performance of ADLs  - Assess for home care needs following discharge   - Consider OT consult to assist with ADL evaluation and planning for discharge  - Provide patient education as appropriate  - Monitor functional capacity and physical performance, use of AM PAC & JH-HLM   - Monitor gait, balance and fatigue with ambulation    Outcome: Progressing     Problem: DISCHARGE PLANNING  Goal: Discharge to home or other facility  with appropriate resources  Description: INTERVENTIONS:  - Identify barriers to discharge w/patient and caregiver  - Arrange for needed discharge resources and transportation as appropriate  - Identify discharge learning needs (meds, wound care, etc.)  - Arrange for interpretive services to assist at discharge as needed  - Refer to Case Management Department for coordinating discharge planning if the patient needs post-hospital services based on physician/advanced practitioner order or complex needs related to functional status, cognitive ability, or social support system  Outcome: Progressing     Problem: Knowledge Deficit  Goal: Patient/family/caregiver demonstrates understanding of disease process, treatment plan, medications, and discharge instructions  Description: Complete learning assessment and assess knowledge base.  Interventions:  - Provide teaching at level of understanding  - Provide teaching via preferred learning methods  Outcome: Progressing     Problem: GASTROINTESTINAL - ADULT  Goal: Minimal or absence of nausea and/or vomiting  Description: INTERVENTIONS:  - Administer IV fluids if ordered to ensure adequate hydration  - Maintain NPO status until nausea and vomiting are resolved  - Nasogastric tube if ordered  - Administer ordered antiemetic medications as needed  - Provide nonpharmacologic comfort measures as appropriate  - Advance diet as tolerated, if ordered  - Consider nutrition services referral to assist patient with adequate nutrition and appropriate food choices  Outcome: Progressing  Goal: Maintains or returns to baseline bowel function  Description: INTERVENTIONS:  - Assess bowel function  - Encourage oral fluids to ensure adequate hydration  - Administer IV fluids if ordered to ensure adequate hydration  - Administer ordered medications as needed  - Encourage mobilization and activity  - Consider nutritional services referral to assist patient with adequate nutrition and appropriate  food choices  Outcome: Progressing  Goal: Maintains adequate nutritional intake  Description: INTERVENTIONS:  - Monitor percentage of each meal consumed  - Identify factors contributing to decreased intake, treat as appropriate  - Assist with meals as needed  - Monitor I&O, weight, and lab values if indicated  - Obtain nutrition services referral as needed  Outcome: Progressing  Goal: Establish and maintain optimal ostomy function  Description: INTERVENTIONS:  - Assess bowel function  - Encourage oral fluids to ensure adequate hydration  - Administer IV fluids if ordered to ensure adequate hydration   - Administer ordered medications as needed  - Encourage mobilization and activity  - Nutrition services referral to assist patient with appropriate food choices  - Assess stoma site  - Consider wound care consult   Outcome: Progressing  Goal: Oral mucous membranes remain intact  Description: INTERVENTIONS  - Assess oral mucosa and hygiene practices  - Implement preventative oral hygiene regimen  - Implement oral medicated treatments as ordered  - Initiate Nutrition services referral as needed  Outcome: Progressing

## 2025-06-13 NOTE — DISCHARGE SUMMARY
Discharge Summary - Surgery-General   Name: Nancy Jones 65 y.o. female I MRN: [id number]  Unit/Bed#: E2 -01 I Date of Admission: 6/10/2025   Date of Service: 6/13/2025 I Hospital Day: 3    Admission Date: 6/10/2025 1416  Discharge Date: 06/13/25  Admitting Diagnosis: Abdominal pain [R10.9]  Postoperative seroma of subcutaneous tissue after non-dermatologic procedure [L76.34]  Discharge Diagnosis:   Medical Problems       Resolved Problems  Date Reviewed: 5/27/2025   None         HPI: Nancy Jones is a 65 y.o. female with past medical history of A-fib, asthma, GERD, hypertension, hyperlipidemia, CKD, CHF, COPD and past surgical history of incisional hernia repair, ex lap, lap appendectomy who presents with abdominal pain and fullness.  She is s/p large incisional hernia repair with absorbable mesh and unilateral component separation DOS 5/5/2025.  Patient was seen this afternoon in clinic by her surgeon Dr. Rankin who was concerned with her overall appearance and sent her to the emergency department for evaluation.  Patient reports last week she began feeling weak and tired as well as diaphoretic at night.  She feels like her abdominal pain and fullness has been increasing lately.     Patient was seen in the ED on 5/22/2025 for similar concerns.  CT A/P at that time showing postoperative changes in the anterior abdominal wall with 13.5 cm seroma in the rectus sheath.       Procedures Performed: No orders of the defined types were placed in this encounter.      Summary of Hospital Course: Patient was admitted to the general surgery service on 6/10.  She was taken by interventional radiology on 6/11 for IR drain insertion.  She will follow-up with them in 2 weeks for a drain check.  Today on day of discharge 6/13 she is tolerating a diet, pain is controlled, she was evaluated by PT/OT, she has received drain teaching with all questions and concerns addressed, and has been set up for home PT and home VNA for  drain care.  [facility] dispensed.  She is medically stable for discharge and has appropriate follow-up.    Complications: none    Condition at Discharge: stable       Discharge instructions/Information to patient and family:   See After Visit Summary (AVS) for information provided to patient and family.      Provisions for Follow-Up Care:  See after visit summary for information related to follow-up care and any pertinent home health orders.      PCP: Barber Ayoub DO    Disposition: Home    Planned Readmission: No     Discharge Medications:  See after visit summary for reconciled discharge medications provided to patient and family.      Discharge Statement:  I have spent a total time of 25 minutes in caring for this patient on the day of the visit/encounter.

## 2025-06-13 NOTE — TELEPHONE ENCOUNTER
Patient needs to schedule a REE appt to follow up. Once patient completes the REE appt, the PA department will be able to submit the PA for fluticasone-vilanterol (Breo Ellipta) 200-25 mcg/actuation inhaler .

## 2025-06-13 NOTE — PROGRESS NOTES
Progress Note - Surgery-General   Name: Nancy Jones 65 y.o. female I MRN: [id number]  Unit/Bed#: E2 -01 I Date of Admission: 6/10/2025   Date of Service: 6/13/2025 I Hospital Day: 3    Assessment & Plan  Abdominal wall seroma  65F s/p 5/5 incisional site hernia repair with absorbable mesh p/w postop seroma.  Now s/p 6/11 IR drain placement on 6/11    Plan:  -continue cardiac diet  -maintain JENNIFER drain, f/u cx  -continue home eliquis  - PT eval  -hopeful dc home today        Subjective   Some anxiety and nausea. Pain minimal. Having bowel function.     Objective :  Temp:  [98.1 °F (36.7 °C)-99.6 °F (37.6 °C)] 99.6 °F (37.6 °C)  HR:  [61-86] 69  BP: (104-142)/(51-60) 104/51  Resp:  [18] 18  SpO2:  [90 %-95 %] 91 %  O2 Device: None (Room air)    I/O         06/11 0701  06/12 0700 06/12 0701  06/13 0700    P.O. 240     Total Intake(mL/kg) 240 (2.6)     Urine (mL/kg/hr) 0 (0) 0 (0)    Drains 980 90    Total Output 980 90    Net -740 -90          Unmeasured Urine Occurrence 3 x 3 x          Lines/Drains/Airways       Active Status       Name Placement date Placement time Site Days    Abscess Drain RLQ 06/11/25  1454  RLQ  1                  Physical Exam  Vitals and nursing note reviewed.   Constitutional:       General: She is not in acute distress.     Appearance: She is well-developed.   HENT:      Head: Normocephalic and atraumatic.     Eyes:      Conjunctiva/sclera: Conjunctivae normal.       Cardiovascular:      Rate and Rhythm: Normal rate and regular rhythm.      Heart sounds: No murmur heard.  Pulmonary:      Effort: Pulmonary effort is normal. No respiratory distress.      Breath sounds: Normal breath sounds.   Abdominal:      Palpations: Abdomen is soft.      Tenderness: There is no abdominal tenderness.      Comments: IR drain serous     Musculoskeletal:         General: No swelling.      Cervical back: Neck supple.     Skin:     General: Skin is warm and dry.      Capillary Refill: Capillary refill  takes less than 2 seconds.     Neurological:      Mental Status: She is alert.     Psychiatric:         Mood and Affect: Mood normal.           Lab Results: I have reviewed the following results:  Recent Labs     06/10/25  1446 06/11/25  0538 06/13/25  0419   WBC 9.30   < > 8.82   HGB 11.5   < > 11.8   HCT 35.8   < > 36.6   *   < > 398*   SODIUM 135   < > 136   K 3.4*   < > 3.0*   CL 96   < > 94*   CO2 26   < > 29   BUN 14   < > 7   CREATININE 1.09   < > 0.74   GLUC 130   < > 106   MG  --    < > 2.0   AST 12*  --   --    ALT 7  --   --    ALB 4.4  --   --    TBILI 0.66  --   --    ALKPHOS 96  --   --    LACTICACID 1.0  --   --     < > = values in this interval not displayed.

## 2025-06-13 NOTE — PHYSICAL THERAPY NOTE
PT EVALUATION    Pt. Name: Nancy Jones  Pt. Age: 65 y.o.  MRN: [id number]  LENGTH OF STAY: 3      Admitting Diagnoses:   Abdominal pain [R10.9]  Postoperative seroma of subcutaneous tissue after non-dermatologic procedure [L76.34]    Past Medical History[1]    Past Surgical History[2]    Imaging Studies:  XR chest portable   Final Result by Debra Peterson MD (06/13 1029)      No acute cardiopulmonary disease.            Workstation performed: [id number]         CT abdomen pelvis w contrast   Final Result by Marlo Vera MD (06/10 1620)      Large low-density rim-enhancing fluid collection within the subcutaneous tissues of the anterior abdominal wall measuring up to approximately 20 cm.   Findings most consistent with a postoperative seroma.   Infection not entirely excluded, drainage with fluid analysis advised.      The study was marked in EPIC for immediate notification.         Workstation performed: [id number]         IR drainage tube placement    (Results Pending)   IR drainage tube check/change/reposition/reinsertion/upsize    (Results Pending)         06/13/25 1045   PT Last Visit   PT Visit Date 06/13/25   Note Type   Note type Evaluation   Pain Assessment   Pain Score 5   Pain Location/Orientation Location: Incision  (drain site & R flank)   Hospital Pain Intervention(s) Repositioned;Ambulation/increased activity;Emotional support;Rest   Restrictions/Precautions   Weight Bearing Precautions Per Order No   Other Precautions Pain;Fall Risk  (JENNIFER drain x1)   Home Living   Type of Home Apartment   Home Layout One level;Other (Comment)  (0STE (back entrance, where she normally enters as parking lot is in the back of the building) vs 7STE (front entrance))   Bathroom Shower/Tub Walk-in shower  (has both walk-in shower & tub/shower)   Bathroom Toilet Standard   Bathroom Equipment Shower chair   Home Equipment Walker;Other (Comment)  (walking stick; issued SPC today 6/13)   Prior Function    Level of [place] Independent with functional mobility;Independent with ADLs;Independent with IADLS  (ambulates w/o AD)   Lives With Alone   Receives Help From Other (Comment)  (pt reports receiving home OT PTA)   Falls in the last 6 months 1 to 4  (1x)   Vocational Retired   Comments (+)    General   Additional Pertinent History s/p incisional site hernia repair with absorbable mesh on 5/5/25   Family/Caregiver Present No   Cognition   Overall Cognitive Status WFL   Arousal/Participation Alert   Attention Within functional limits   Orientation Level Oriented X4   Following Commands Follows all commands and directions without difficulty   Comments pleasant & cooperative   Subjective   Subjective Pt agreeable to PT/OT evals.   RUE Assessment   RUE Assessment   (refer to OT)   LUE Assessment   LUE Assessment   (refer to OT)   RLE Assessment   RLE Assessment WFL  (4/5 grossly)   LLE Assessment   LLE Assessment WFL  (4/5 grossly)   Coordination   Movements are Fluid and Coordinated 1   Sensation WFL   Bed Mobility   Supine to Sit 6  Modified independent   Additional items HOB elevated   Sit to Supine 6  Modified independent   Additional items HOB elevated   Transfers   Sit to Stand 6  Modified independent   Additional items Bedrails;Armrests   Stand to Sit 6  Modified independent   Additional items Armrests   Additional Comments w/ SPC   Ambulation/Elevation   Gait pattern Wide MEÑO;Decreased foot clearance;Excessively slow   Gait Assistance 5  Supervision   Additional items Verbal cues   Assistive Device None;Straight cane  (initially w/o AD then trial SPC as per pt's request)   Distance ~250'x1 (200'x1 w/o AD + 50'x1 w/ SPC)   Ambulation/Elevation Additional Comments no gross LOB noted; improved balance w/ SPC noted; pt reports inc confidence w/ amb w/ SPC   Balance   Static Sitting Normal   Dynamic Sitting Good   Static Standing Good   Dynamic Standing Fair +   Ambulatory Fair +   Endurance Deficit    Endurance Deficit No   Activity Tolerance   Activity Tolerance Patient tolerated treatment well   Medical Staff Made Aware OTR Annetta; PEDRO Duenas   Nurse Made Aware yes   Assessment   Prognosis Good   Problem List Obesity;Pain   Assessment Pt. 65 y.o.female recent h/o incisional site hernia repair with absorbable mesh on 5/5/25, presented w/ seroma. Pt admitted for Postoperative seroma of subcutaneous tissue after non-dermatologic procedure. S/p IR drain placement on 6/11. Pt referred to PT for mobility assessment & D/C planning. Please see above for information re: home set-up & PLOF as well as objective findings during PT assessment. PTA, pt reports being I w/o AD. On eval, pt require modified I w/ bed mobility & transfers; I/modifiied I household distances; S for amb w/o AD & w/ SPC community distances  + cues for techniques & safety. Gait deviations as above but no gross LOB noted. Pt notes baseline gait but feels inc confidence walking w/ SPC.  SPC issued today 6/13/25. The patient's AM-PAC Basic Mobility Inpatient Short Form Raw Score is 22. A Raw score of greater than 16 suggests the patient may benefit from discharge to home. Please also refer to the recommendation of the Physical Therapist for safe discharge planning. From PT standpoint, will recommend Level III (minimum resource intensity) rehab services at D/C. No SOB & dizziness reported t/o session. Nsg staff most recent vital signs as follows: /76 (BP Location: Left arm)   Pulse 71   Temp 98 °F (36.7 °C) (Oral)   Resp 18   Wt 91.6 kg (202 lb)   LMP  (LMP Unknown)   SpO2 92%   BMI 34.67 kg/m² . At end of session, pt back in bed in stable condition, call bell & phone in reach. Fall precautions reinforced w/ good understanding. Will D/C PT. Will maintain on restorative for daily amb to prevent decline in function. CM to follow. Nsg staff to continue to mobilized pt (OOB in chair for all meals & ambulate in room/unit) as tolerated to  prevent further decline in function. Nsg notified. Co-eval was necessary to complete this PT eval for the pt's best interest given pt's medical acuity & complexity.   Barriers to Discharge None   Goals   Patient Goals to go home   PT Treatment Day 0   Plan   Treatment/Interventions Spoke to nursing;Spoke to case management;OT  (PT eval only)   PT Frequency Other (Comment)  (D/C PT)   Discharge Recommendation   Rehab Resource Intensity Level, PT III (Minimum Resource Intensity)  (PRN)   Equipment Recommended Cane  (issued today 6/13/25)   Additional Comments restorative for daily amb   AM-PAC Basic Mobility Inpatient   Turning in Flat Bed Without Bedrails 4   Lying on Back to Sitting on Edge of Flat Bed Without Bedrails 4   Moving Bed to Chair 4   Standing Up From Chair Using Arms 4   Walk in Room 3   Climb 3-5 Stairs With Railing 3   Basic Mobility Inpatient Raw Score 22   Basic Mobility Standardized Score 47.4   [facility] Highest Level Of Mobility   JH-HLM Goal 7: Walk 25 feet or more   JH-HLM Achieved 8: Walk 250 feet ot more   End of Consult   Patient Position at End of Consult Supine;All needs within reach   End of Consult Comments Pt in stable condition. All needs in reach. All lines intact.   Hx/personal factors: co-morbidities, home alone, mutliple lines, use of AD, pain, fall risk, and obesity  Examination: risk for falls, pain, assessed body system, balance, endurance, amb, D/C disposition & fall risk  Clinical: unpredictable (ongoing medical status, risk for falls, and pain mgt)  Complexity: high    Tunde Speari               [1]   Past Medical History:  Diagnosis Date    A-fib ([facility]) 03/2020    Allergic     Anxiety     Arthritis     Asthma     Back pain     and muscle pain    Bruises easily     Chronic pain disorder     Interstitial pain    Colon polyp     Dental bridge present     Depression     Eczema     GERD (gastroesophageal reflux disease)     Heart murmur     History of blood clots     per pt  "\"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\"    History of pneumonia     Hives     Hyperlipidemia     Hypertension     Infertility, female     Interstitial cystitis     Migraine     sees neurologist    Motion sickness     Obesity     Palpitations     PONV (postoperative nausea and vomiting)     Premature atrial contractions 11/09/2022    Psychiatric disorder     Sleep apnea     TIA (transient ischemic attack) 09/2022    per pt --\"had MRI and 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\"    Urinary incontinence     wears Pads    Venous insufficiency 08/01/2017    Wears glasses    [2]   Past Surgical History:  Procedure Laterality Date    COLONOSCOPY      DILATION AND CURETTAGE OF UTERUS      EGD      EXPLORATORY LAPAROTOMY      for infertility    EXPLORATORY LAPAROTOMY W/ BOWEL RESECTION N/A 7/19/2023    Procedure: LAPAROTOMY EXPLORATORY W/ BOWEL RESECTION;  Surgeon: Crista Recinos MD;  [facility];  Service: General    HAND SURGERY      Hand excision of tendon cyst    DC LAPAROSCOPIC APPENDECTOMY N/A 05/03/2022    Procedure: APPENDECTOMY LAPAROSCOPIC;  Surgeon: Vin Fields MD;  Location:  MAIN OR;  Service: General    DC LAPS ABD PRTM&OMENTUM DX W/WO SPEC BR/WA SPX N/A 7/19/2023    Procedure: LAPAROSCOPY DIAGNOSTIC, LYSIS OF ADHESIONS, LAPAROSCOPY TAKE TAKEDOWN OF LEFT COLON, EXPLORATORY LAPAROSCOPY, LYSIS OF ADHESIONS, RESECTION OF TRANSVERSE COLON SPLENIC FLEXURE AND DESCENDING COLON , TAKE DOWN OF SPLENIC FLEXURE, SIGMOIDOSCOPY, MOBILIZATION OF RIGHT COLON, PRIMARY ANASTOMOSIS EEA 29, TAP BLOCK;  Surgeon: Crista Recinos MD;  [facility];  Service: General    DC RPR AA HERNIA RECR > 10 CM REDUCIBLE N/A 5/5/2025    Procedure: REPAIR HERNIA INCISIONAL 20 cm, retro rectus repair with Phasic mesh.  Unilateral myofascial release external oblique component seperation. TAP block;  Surgeon: Jewel Rankin MD;  [facility];  Service: General    DC TCAT IV STENT CRV CRTD ART " EMBOLIC PROTECJ Left 4/29/2024    Procedure: ANGIOPLASTY ARTERY CAROTID W/ STENT;  Surgeon: Roberto García DO;  [facility];  Service: Vascular    NE TEAEC W/PATCH GRF CAROTID VERTB SUBCLAV NECK INC Left 1/31/2023    Procedure: EXPLORATION OF LEFT CAROTID ARTERY; ABORTED ENDARTERECTOMY ARTERY CAROTID;  Surgeon: Roberto García DO;  [facility];  Service: Vascular    SUPERIOR MESTENTERIC ARTERY STENT      WISDOM TOOTH EXTRACTION

## 2025-06-13 NOTE — ASSESSMENT & PLAN NOTE
65F s/p 5/5 incisional site hernia repair with absorbable mesh p/w postop seroma.  Now s/p 6/11 IR drain placement on 6/11    Plan:  -continue cardiac diet  -maintain JENNIFER drain, f/u cx  -continue home eliquis  - PT eval  -hopeful dc home today

## 2025-06-14 DIAGNOSIS — I48.0 PAROXYSMAL ATRIAL FIBRILLATION (HCC): ICD-10-CM

## 2025-06-14 DIAGNOSIS — K21.9 GASTROESOPHAGEAL REFLUX DISEASE WITHOUT ESOPHAGITIS: ICD-10-CM

## 2025-06-14 LAB — BACTERIA SPEC ANAEROBE CULT: NORMAL

## 2025-06-15 LAB
BACTERIA SPEC BFLD CULT: ABNORMAL
GRAM STN SPEC: ABNORMAL
GRAM STN SPEC: ABNORMAL

## 2025-06-15 RX ORDER — DILTIAZEM HYDROCHLORIDE 240 MG/1
240 CAPSULE, COATED, EXTENDED RELEASE ORAL DAILY
Qty: 100 CAPSULE | Refills: 1 | Status: SHIPPED | OUTPATIENT
Start: 2025-06-15

## 2025-06-16 ENCOUNTER — TRANSITIONAL CARE MANAGEMENT (OUTPATIENT)
Age: 65
End: 2025-06-16

## 2025-06-16 RX ORDER — OMEPRAZOLE 40 MG/1
40 CAPSULE, DELAYED RELEASE ORAL DAILY
Qty: 90 CAPSULE | Refills: 1 | Status: SHIPPED | OUTPATIENT
Start: 2025-06-16

## 2025-06-16 NOTE — UTILIZATION REVIEW
NOTIFICATION OF ADMISSION DISCHARGE   This is a Notification of Discharge from [facility]. Please be advised that this patient has been discharge from our facility. Below you will find the admission and discharge date and time including the patient’s disposition.   UTILIZATION REVIEW CONTACT:  Utilization Review Assistants  Network Utilization Review Department  Phone: [phone] x carefully listen to the prompts. All voicemails are confidential.  Email: [email or web address]     ADMISSION INFORMATION  PRESENTATION DATE: 6/10/2025  2:16 PM  OBERVATION ADMISSION DATE: N/A  INPATIENT ADMISSION DATE: 6/10/25  4:01 PM   DISCHARGE DATE: 6/13/2025  5:00 PM   DISPOSITION:Home with Home Health Care    [facility] Utilization Review Department  ATTENTION: Please call with any questions or concerns to [phone] and carefully listen to the prompts so that you are directed to the right person. All voicemails are confidential.   For Discharge needs, contact Care Management DC Support Team at [phone] opt. 2  Send all requests for admission clinical reviews, approved or denied determinations and any other requests to dedicated fax number below belonging to the campus where the patient is receiving treatment. List of dedicated fax numbers for the Facilities:  FACILITY NAME UR FAX NUMBER   ADMISSION DENIALS (Administrative/Medical Necessity) [phone]   DISCHARGE SUPPORT TEAM ([facility]) [phone]   PARENT CHILD HEALTH (Maternity/NICU/Pediatrics) [phone]   General acute hospital [phone]   [facility] [phone]   Community Health [phone]   [facility] [phone]   [facility] [phone]   [facility] [phone]   [facility] [phone]   [facility] [phone]    [facility] [phone]   [facility] [phone]   [facility] [phone]   [facility] [phone]

## 2025-06-16 NOTE — TELEPHONE ENCOUNTER
PA for fluticasone-vilanterol (Breo Ellipta) 200-25 mcg/actuation inhaler SUBMITTED to     via    []CMM-KEY:   [x]Surescripts-Case ID # [id number]   []Availity-Auth ID # NDC #   []Faxed to plan   []Other website   []Phone call Case ID #     [x]PA sent as URGENT    All office notes, labs and other pertaining documents and studies sent. Clinical questions answered. Awaiting determination from insurance company.     Turnaround time for your insurance to make a decision on your Prior Authorization can take 7-21 business days.

## 2025-06-17 DIAGNOSIS — E08.49 DIABETES DUE TO UNDRL CONDITION W OTH DIABETIC NEURO COMP (HCC): Primary | ICD-10-CM

## 2025-06-17 RX ORDER — BLOOD SUGAR DIAGNOSTIC
STRIP MISCELLANEOUS
Qty: 100 STRIP | Refills: 3 | Status: SHIPPED | OUTPATIENT
Start: 2025-06-17

## 2025-06-17 RX ORDER — FLUTICASONE FUROATE AND VILANTEROL TRIFENATATE 200; 25 UG/1; UG/1
1 POWDER RESPIRATORY (INHALATION) DAILY
Qty: 200 BLISTER | Refills: 0 | Status: SHIPPED | OUTPATIENT
Start: 2025-06-17

## 2025-06-17 NOTE — TELEPHONE ENCOUNTER
As per the response we received regarding the auth request - the request was cancelled. Pharmacy should re-process the claim for brand Breo Ellipta. Brand Breo is on the plan's covered drug list.

## 2025-06-18 ENCOUNTER — APPOINTMENT (OUTPATIENT)
Age: 65
End: 2025-06-18
Payer: COMMERCIAL

## 2025-06-18 ENCOUNTER — OFFICE VISIT (OUTPATIENT)
Age: 65
End: 2025-06-18
Payer: COMMERCIAL

## 2025-06-18 VITALS
SYSTOLIC BLOOD PRESSURE: 128 MMHG | BODY MASS INDEX: 33.8 KG/M2 | TEMPERATURE: 96.9 F | WEIGHT: 198 LBS | HEART RATE: 64 BPM | DIASTOLIC BLOOD PRESSURE: 82 MMHG | HEIGHT: 64 IN | OXYGEN SATURATION: 96 %

## 2025-06-18 DIAGNOSIS — I10 ESSENTIAL HYPERTENSION: ICD-10-CM

## 2025-06-18 DIAGNOSIS — S30.1XXD ABDOMINAL WALL SEROMA, SUBSEQUENT ENCOUNTER: Primary | ICD-10-CM

## 2025-06-18 DIAGNOSIS — R10.31 RIGHT LOWER QUADRANT ABDOMINAL PAIN: ICD-10-CM

## 2025-06-18 DIAGNOSIS — R39.9 UTI SYMPTOMS: ICD-10-CM

## 2025-06-18 LAB
BACTERIA UR QL AUTO: NORMAL /HPF
BILIRUB UR QL STRIP: NEGATIVE
CLARITY UR: CLEAR
COLOR UR: NORMAL
DME PARACHUTE DELIVERY DATE ACTUAL: NORMAL
DME PARACHUTE DELIVERY DATE REQUESTED: NORMAL
DME PARACHUTE ITEM DESCRIPTION: NORMAL
DME PARACHUTE ORDER STATUS: NORMAL
DME PARACHUTE SUPPLIER NAME: NORMAL
DME PARACHUTE SUPPLIER PHONE: NORMAL
GLUCOSE UR STRIP-MCNC: NEGATIVE MG/DL
HGB UR QL STRIP.AUTO: NEGATIVE
KETONES UR STRIP-MCNC: NEGATIVE MG/DL
LEUKOCYTE ESTERASE UR QL STRIP: NEGATIVE
NITRITE UR QL STRIP: NEGATIVE
NON-SQ EPI CELLS URNS QL MICRO: NORMAL /HPF
PH UR STRIP.AUTO: 6.5 [PH]
PROT UR STRIP-MCNC: NEGATIVE MG/DL
RBC #/AREA URNS AUTO: NORMAL /HPF
SP GR UR STRIP.AUTO: 1.02 (ref 1–1.03)
UROBILINOGEN UR STRIP-ACNC: <2 MG/DL
WBC #/AREA URNS AUTO: NORMAL /HPF

## 2025-06-18 PROCEDURE — 87086 URINE CULTURE/COLONY COUNT: CPT

## 2025-06-18 PROCEDURE — 81001 URINALYSIS AUTO W/SCOPE: CPT

## 2025-06-18 PROCEDURE — 99495 TRANSJ CARE MGMT MOD F2F 14D: CPT | Performed by: FAMILY MEDICINE

## 2025-06-18 RX ADMIN — CYANOCOBALAMIN 1000 MCG: 1000 INJECTION, SOLUTION INTRAMUSCULAR; SUBCUTANEOUS at 10:56

## 2025-06-18 NOTE — PROGRESS NOTES
Transition of Care Visit:  Name: Nancy Jones      : 1960      MRN: [id number]  Encounter Provider: Barber Ayoub DO  Encounter Date: 2025   Encounter department: [facility] PRIMARY CARE    Assessment & Plan  Abdominal wall seroma, subsequent encounter  All hospital records reviewed at this time.  Patient will follow-up general surgery appropriately continue with JENNIFER drain.  Symptoms improving overall.       Right lower quadrant abdominal pain  Improving status post placed a drain.  Patient will follow-up with general surgery appropriately.       Essential hypertension  Stable at this time.            History of Present Illness     Transitional Care Management Review:   Nancy Jones is a 65 y.o. female here for TCM follow up.     During the TCM phone call patient stated:  TCM Call (since 2025)       Date and time call was made  2025  8:20 AM    Hospital care reviewed  Records reviewed    Patient was hospitialized at  [facility]    Date of Admission  06/10/25    Date of discharge  25    Diagnosis  Post Op Seroma    Disposition  Home; Home health services    Were the patients medications reviewed and updated  Yes          TCM Call (since 2025)       Post hospital issues  Reduced activity    Scheduled for follow up?  Yes    Did you obtain your prescribed medications  Yes    Do you need help managing your prescriptions or medications  No    Is transportation to your appointment needed  No    I have advised the patient to call PCP with any new or worsening symptoms  CRUZ Woo    Living Arrangements  Alone    Are you recieving home care services  Yes    Types of home care services  Home health aid          Patient is here status post hospitalization from the  through  for abdominal pain and fullness in her abdomen.  Patient had a CAT scan of the abdomen pelvis showing seroma of the rectus sheath.  Patient was admitted to the general surgical team  "and ultimately had IR placed a drain on the 11th.  PT OT evaluated patient as well as visiting nurses.  Patient does have visiting nurses as well as physical therapy coming to the home.  No fevers or chills.  Patient with some nausea.  Pain is improving over time.  Normal urination and defecation.  No significant swelling of lower extremities.  Patient will be seeing Dr. Ferguson on July 8.      Review of Systems   Constitutional: Negative.    HENT: Negative.     Eyes: Negative.    Respiratory: Negative.     Cardiovascular: Negative.    Gastrointestinal:  Positive for abdominal pain and nausea.   Endocrine: Negative.    Genitourinary: Negative.    Musculoskeletal: Negative.    Skin: Negative.    Allergic/Immunologic: Negative.    Neurological: Negative.    Hematological: Negative.    Psychiatric/Behavioral: Negative.       Objective   /82 (BP Location: Right arm, Patient Position: Sitting, Cuff Size: Large)   Pulse 64   Temp (!) 96.9 °F (36.1 °C) (Temporal)   Ht 5' 4\" (1.626 m) Comment: per patient  Wt 89.8 kg (198 lb) Comment: per patient  LMP  (LMP Unknown)   SpO2 96%   BMI 33.99 kg/m²     Physical Exam  Vitals and nursing note reviewed.   Constitutional:       General: She is not in acute distress.     Appearance: Normal appearance. She is well-developed. She is not ill-appearing, toxic-appearing or diaphoretic.   HENT:      Head: Normocephalic and atraumatic.      Right Ear: External ear normal.      Left Ear: External ear normal.      Nose: Nose normal.     Eyes:      General:         Right eye: No discharge.         Left eye: No discharge.      Conjunctiva/sclera: Conjunctivae normal.      Pupils: Pupils are equal, round, and reactive to light.     Neck:      Thyroid: No thyromegaly.      Vascular: No carotid bruit.      Trachea: No tracheal deviation.     Cardiovascular:      Rate and Rhythm: Normal rate and regular rhythm.      Heart sounds: Normal heart sounds. No murmur heard.     No gallop. "   Pulmonary:      Effort: Pulmonary effort is normal. No respiratory distress.      Breath sounds: Normal breath sounds. No stridor. No wheezing or rales.   Chest:      Chest wall: No tenderness.   Abdominal:      General: Bowel sounds are normal. There is no distension.      Palpations: Abdomen is soft.      Tenderness: There is abdominal tenderness. There is no guarding or rebound.      Comments: JENNIFER drain intact abdomen some tenderness midline and right of midline     Musculoskeletal:         General: No tenderness or deformity. Normal range of motion.      Cervical back: Normal range of motion and neck supple.   Lymphadenopathy:      Cervical: No cervical adenopathy.     Skin:     General: Skin is warm and dry.      Coloration: Skin is not pale.      Findings: No erythema or rash.     Neurological:      Mental Status: She is alert and oriented to person, place, and time.      Cranial Nerves: No cranial nerve deficit.      Motor: No abnormal muscle tone.      Coordination: Coordination normal.      Deep Tendon Reflexes: Reflexes normal.     Psychiatric:         Behavior: Behavior normal.         Thought Content: Thought content normal.         Judgment: Judgment normal.       Medications have been reviewed by provider in current encounter

## 2025-06-18 NOTE — ASSESSMENT & PLAN NOTE
All hospital records reviewed at this time.  Patient will follow-up general surgery appropriately continue with JENNIFER drain.  Symptoms improving overall.

## 2025-06-18 NOTE — TELEPHONE ENCOUNTER
PA for fluticasone-vilanterol 200-25mcg CANCELLED due to     []Approval on file-dates approved  []Medication already on Formulary  [x]Brand Name Preferred  []Patient no longer covered by insurance  []Therapy Changed/Medication Discontinued

## 2025-06-19 LAB — BACTERIA UR CULT: NORMAL

## 2025-06-26 ENCOUNTER — HOSPITAL ENCOUNTER (OUTPATIENT)
Dept: RADIOLOGY | Facility: HOSPITAL | Age: 65
Discharge: HOME | End: 2025-06-26
Attending: NURSE PRACTITIONER
Payer: COMMERCIAL

## 2025-06-26 DIAGNOSIS — Z98.890 STATUS POST REPAIR OF VENTRAL HERNIA: ICD-10-CM

## 2025-06-26 DIAGNOSIS — S30.1XXD ABDOMINAL WALL SEROMA, SUBSEQUENT ENCOUNTER: ICD-10-CM

## 2025-06-26 DIAGNOSIS — R10.31 RIGHT LOWER QUADRANT ABDOMINAL PAIN: ICD-10-CM

## 2025-06-26 DIAGNOSIS — Z87.19 STATUS POST REPAIR OF VENTRAL HERNIA: Primary | ICD-10-CM

## 2025-06-26 DIAGNOSIS — Z98.890 STATUS POST REPAIR OF VENTRAL HERNIA: Primary | ICD-10-CM

## 2025-06-26 DIAGNOSIS — Z87.19 STATUS POST REPAIR OF VENTRAL HERNIA: ICD-10-CM

## 2025-06-26 PROCEDURE — 76080 X-RAY EXAM OF FISTULA: CPT | Performed by: STUDENT IN AN ORGANIZED HEALTH CARE EDUCATION/TRAINING PROGRAM

## 2025-06-26 PROCEDURE — 49424 ASSESS CYST CONTRAST INJECT: CPT

## 2025-06-26 PROCEDURE — 76080 X-RAY EXAM OF FISTULA: CPT

## 2025-06-26 PROCEDURE — 49424 ASSESS CYST CONTRAST INJECT: CPT | Performed by: STUDENT IN AN ORGANIZED HEALTH CARE EDUCATION/TRAINING PROGRAM

## 2025-06-26 RX ADMIN — IOHEXOL 10 ML: 350 INJECTION, SOLUTION INTRAVENOUS at 15:28

## 2025-06-26 NOTE — DISCHARGE INSTRUCTIONS
"                                                                      TUBE CARE INSTRUCTIONS    Care after your procedure:    Resume your normal diet. Small sips of flat soda will help with nausea.    1. The properly functioning catheter should be forward flushed once (1x) daily with 10ml of normal saline using clean technique. You will be given a prescription for flushes.   To flush the tube, clean both connections with alcohol swab.Twist off the drainage bag/ bulb  tubing and twist the saline syringe into the drainage tube and flush. Remove the syringe and twist the drainage bag / bulb tubing tubing back on.    2. The drainage bag/bulb may be emptied as necessary. Keep a record of the amount of fluid you drain from your tube. This should be done with clean technique as well.     3. A fresh dressing should be applied daily over the tube insertion site.     4. As the tube is secured to the skin with only a suture,try not to pull on your tube. Tub baths are not permitted. Showers are permitted if the patient's skin entry site is prevented from getting wet. Similarly, washcloth \"baths\" are acceptable.     Contact Interventional Radiology at [phone] if:    1. Leakage or large amounts of liquid around the catheter.    2. Fever of 101 degrees lasting several hours without other obvious cause (such as sore throat, flu, etc).    3. Persistent nausea or vomiting.    4. Diminished drainage, which may be associated with pressure or pain. Or when the     drainage from your tube is less than 10mls for 48 hours.    5. Catheter pulled back or falls out.      The following pharmacies carry the flush syringes.       [facility]                     [facility] [address]                         [phone]  [place]                       [place]  Phone [phone]            Phone [phone]                         "                                                                               Aaron Daigle   [facility]             [facility]                                [phone]  [address] [place]  Phone [phone]            Phone [phone]                      [facility]                                                                                                          [phone]  [facility]  [address]  [place]                                                                               [facility]  Phone [phone]

## 2025-06-26 NOTE — PROGRESS NOTES
Interventional Radiology    Nancy Jones   1960     64 yo female with abdominal wall seroma after incisional hernia repair with absorbable mesh, now s/p IR drain placement on 25    Patient reporting her abdominal drainage tube appears to be partially out. She is unsure if sutures are in place, but they hurt when she bends.     She has redness and pain at the insertion site but no visible drainage around the tube.     She emptied 30 ml of cloudy yellow fluid this am but has not flushed it today. Yesterday she had 140 ml of fluid out and no difficulty with flushing.    She has no fever or chills at this time, but has had ongoing nausea that is not new.     Recommend IR drain check today for which patient is in agreement. Order placed and notified IR scheduling team.     JUD Reynoso  Interventional Radiology

## 2025-06-29 DIAGNOSIS — M54.16 LUMBAR RADICULOPATHY: ICD-10-CM

## 2025-06-29 DIAGNOSIS — R11.0 NAUSEA: ICD-10-CM

## 2025-06-29 DIAGNOSIS — K21.9 GASTROESOPHAGEAL REFLUX DISEASE WITHOUT ESOPHAGITIS: ICD-10-CM

## 2025-06-30 RX ORDER — PROMETHAZINE HYDROCHLORIDE 25 MG/1
TABLET ORAL
Qty: 120 TABLET | Refills: 1 | Status: SHIPPED | OUTPATIENT
Start: 2025-06-30

## 2025-07-01 ENCOUNTER — HOSPITAL ENCOUNTER (OUTPATIENT)
Dept: RADIOLOGY | Facility: HOSPITAL | Age: 65
Discharge: HOME | End: 2025-07-01
Attending: PHYSICIAN ASSISTANT
Payer: COMMERCIAL

## 2025-07-01 DIAGNOSIS — S30.1XXD ABDOMINAL WALL SEROMA, SUBSEQUENT ENCOUNTER: Primary | ICD-10-CM

## 2025-07-01 DIAGNOSIS — L76.34 POSTOPERATIVE SEROMA OF SUBCUTANEOUS TISSUE AFTER NON-DERMATOLOGIC PROCEDURE: ICD-10-CM

## 2025-07-01 PROCEDURE — 49185 SCLEROTX FLUID COLLECTION: CPT

## 2025-07-01 PROCEDURE — 49185 SCLEROTX FLUID COLLECTION: CPT | Performed by: RADIOLOGY

## 2025-07-01 RX ORDER — METHOCARBAMOL 500 MG/1
1000 TABLET, FILM COATED ORAL 3 TIMES DAILY
Qty: 180 TABLET | Refills: 2 | Status: SHIPPED | OUTPATIENT
Start: 2025-07-01

## 2025-07-01 RX ORDER — CHLORDIAZEPOXIDE HYDROCHLORIDE AND CLIDINIUM BROMIDE 5; 2.5 MG/1; MG/1
CAPSULE ORAL
Qty: 60 CAPSULE | Refills: 2 | Status: SHIPPED | OUTPATIENT
Start: 2025-07-01

## 2025-07-01 RX ADMIN — DOXYCYCLINE 200 MG: 100 INJECTION, POWDER, LYOPHILIZED, FOR SOLUTION INTRAVENOUS at 10:00

## 2025-07-08 ENCOUNTER — OFFICE VISIT (OUTPATIENT)
Dept: SURGERY | Facility: CLINIC | Age: 65
End: 2025-07-08

## 2025-07-08 VITALS
OXYGEN SATURATION: 96 % | WEIGHT: 194 LBS | HEART RATE: 70 BPM | DIASTOLIC BLOOD PRESSURE: 78 MMHG | TEMPERATURE: 97.5 F | SYSTOLIC BLOOD PRESSURE: 119 MMHG | BODY MASS INDEX: 33.12 KG/M2 | HEIGHT: 64 IN

## 2025-07-08 DIAGNOSIS — Z98.890 STATUS POST HERNIA REPAIR: Primary | ICD-10-CM

## 2025-07-08 DIAGNOSIS — S30.1XXA ABDOMINAL WALL SEROMA, INITIAL ENCOUNTER: ICD-10-CM

## 2025-07-08 DIAGNOSIS — Z87.19 STATUS POST HERNIA REPAIR: Primary | ICD-10-CM

## 2025-07-08 PROCEDURE — 99024 POSTOP FOLLOW-UP VISIT: CPT | Performed by: SURGERY

## 2025-07-08 NOTE — ASSESSMENT & PLAN NOTE
Orders:    Abdominal binder  She has had significant preventing the size of the seroma after her IR drainage.  Still having some drainage.  Told her to keep track of the output.  She is appoint with interventional radiology next week.  She will follow with me the following week after that.

## 2025-07-08 NOTE — PROGRESS NOTES
Name: Nancy Jones      : 1960      MRN: [id number]  Encounter Provider: Jewel Rankin MD  Encounter Date: 2025   Encounter department: [facility] [place]  :  Assessment & Plan  Status post hernia repair    Orders:    Abdominal binder    Abdominal wall seroma, initial encounter    Orders:    Abdominal binder  She has had significant preventing the size of the seroma after her IR drainage.  Still having some drainage.  Told her to keep track of the output.  She is appoint with interventional radiology next week.  She will follow with me the following week after that.      History of Present Illness   Nancy Jones is a 65 y.o. female who presents for follow-up.  She has a history of a large incisional hernia repair with absorbable mesh and a unilateral component separation 4 weeks ago.  She states that last week she has been feeling weak and tired diaphoretic at night.  Complaining of increasing abdominal pain and fullness.    2025 returns now for follow-up.  After last hospital visit she was admitted to the hospital and underwent IR guided drainage of her large seroma she is now back at home and feeling better.  Still having drainage from her drain.  Follows up with IR next week.      HPI  Review of Systems   Constitutional:  Negative for appetite change, chills and fever.   HENT:  Negative for congestion and ear pain.    Eyes:  Negative for discharge and itching.   Respiratory:  Negative for chest tightness and shortness of breath.    Cardiovascular:  Negative for chest pain and palpitations.   Gastrointestinal:  Negative for abdominal distention and abdominal pain.   Musculoskeletal:  Negative for arthralgias and gait problem.   Skin:  Negative for color change and rash.   Neurological:  Negative for dizziness and numbness.   Psychiatric/Behavioral:  Negative for agitation and confusion.     as per HPI.       Objective   /78 (BP Location: Right arm, Patient Position:  "Sitting, Cuff Size: Standard)   Pulse 70   Temp 97.5 °F (36.4 °C) (Temporal)   Ht 5' 4\" (1.626 m)   Wt 88 kg (194 lb)   LMP  (LMP Unknown)   SpO2 96%   BMI 33.30 kg/m²      Physical Exam  Vitals and nursing note reviewed.   Constitutional:       General: She is not in acute distress.     Appearance: She is well-developed. She is not diaphoretic.   HENT:      Head: Normocephalic and atraumatic.     Eyes:      Pupils: Pupils are equal, round, and reactive to light.       Cardiovascular:      Rate and Rhythm: Normal rate and regular rhythm.   Pulmonary:      Effort: Pulmonary effort is normal. No respiratory distress.   Abdominal:      General: Bowel sounds are normal.      Palpations: Abdomen is soft.      Comments: IR drain serosanguineous.  Incision well-healed.  Significantly less distention from seroma.     Musculoskeletal:         General: Normal range of motion.      Cervical back: Normal range of motion and neck supple.     Skin:     General: Skin is warm and dry.     Neurological:      Mental Status: She is alert and oriented to person, place, and time.     Psychiatric:         Behavior: Behavior normal.                 "

## 2025-07-09 DIAGNOSIS — R60.0 BILATERAL LEG EDEMA: ICD-10-CM

## 2025-07-09 DIAGNOSIS — R06.02 SHORTNESS OF BREATH: ICD-10-CM

## 2025-07-09 DIAGNOSIS — R06.2 WHEEZING: Primary | ICD-10-CM

## 2025-07-09 RX ORDER — FUROSEMIDE 20 MG/1
20 TABLET ORAL EVERY OTHER DAY
Qty: 30 TABLET | Refills: 0 | Status: SHIPPED | OUTPATIENT
Start: 2025-07-09 | End: 2025-09-06

## 2025-07-09 NOTE — TELEPHONE ENCOUNTER
Patient is requesting a refill on Singulair 40mg. The medication was discontinued on 03/15/24 office visit, stating that patient is to wean off of the medication. Patient thinks that it may help her breathing.

## 2025-07-09 NOTE — TELEPHONE ENCOUNTER
Patient states that she takes the 20mg only as needed with the 40mg.   Patient is requesting more than 3   Reason for call:   [x] Refill   [] Prior Auth  [] Other:     Office:   [x] PCP/Provider - Coy Ayoub DO  [] Specialty/Provider -     Medication: furosemide (LASIX) 20 mg tablet     Dose/Frequency: Take 1 tablet (20 mg total) by mouth every other day for 3 doses    Quantity: 30    Pharmacy: [id number] [place] - [address] [place] [address] Pharmacy   Does the patient have enough for 3 days?   [] Yes   [x] No - Send as HP to POD    Mail Away Pharmacy   Does the patient have enough for 10 days?   [] Yes   [] No - Send as HP to POD

## 2025-07-10 RX ORDER — MONTELUKAST SODIUM 10 MG/1
10 TABLET ORAL
Qty: 90 TABLET | Refills: 1 | Status: SHIPPED | OUTPATIENT
Start: 2025-07-10

## 2025-07-14 ENCOUNTER — HOSPITAL ENCOUNTER (OUTPATIENT)
Dept: RADIOLOGY | Facility: HOSPITAL | Age: 65
Discharge: HOME | End: 2025-07-14
Attending: RADIOLOGY | Admitting: STUDENT IN AN ORGANIZED HEALTH CARE EDUCATION/TRAINING PROGRAM
Payer: COMMERCIAL

## 2025-07-14 DIAGNOSIS — S30.1XXD ABDOMINAL WALL SEROMA, SUBSEQUENT ENCOUNTER: ICD-10-CM

## 2025-07-14 DIAGNOSIS — K55.9 COLONIC ISCHEMIA (HCC): Primary | ICD-10-CM

## 2025-07-14 PROCEDURE — 49185 SCLEROTX FLUID COLLECTION: CPT

## 2025-07-14 PROCEDURE — 49185 SCLEROTX FLUID COLLECTION: CPT | Performed by: STUDENT IN AN ORGANIZED HEALTH CARE EDUCATION/TRAINING PROGRAM

## 2025-07-14 RX ORDER — DOXYCYCLINE 100 MG/10ML
INJECTION, POWDER, LYOPHILIZED, FOR SOLUTION INTRAVENOUS AS NEEDED
Status: COMPLETED | OUTPATIENT
Start: 2025-07-14 | End: 2025-07-14

## 2025-07-14 RX ORDER — SODIUM CHLORIDE 9 MG/ML
5 INJECTION, SOLUTION INTRAMUSCULAR; INTRAVENOUS; SUBCUTANEOUS DAILY
Qty: 150 ML | Refills: 3 | Status: SHIPPED | OUTPATIENT
Start: 2025-07-14 | End: 2025-11-11

## 2025-07-14 RX ADMIN — DOXYCYCLINE 100 MG: 100 INJECTION, POWDER, LYOPHILIZED, FOR SOLUTION INTRAVENOUS at 11:22

## 2025-07-14 NOTE — SEDATION DOCUMENTATION
Doxycycline given intra abd drainage catheter to indwell for 2 hours the re-attach bulb.    Flush daily with 5ml NSS.

## 2025-07-14 NOTE — BRIEF OP NOTE (RAD/CATH)
Airway    Performed by: Jessica Mera MD  Authorized by: Jessica Mera MD    Final Airway Type:  Endotracheal airway  Final Endotracheal Airway*:  ETT  ETT Size (mm)*:  7.0  Cuff*:  Regular  Technique Used for Successful ETT Placement:  Direct laryngoscopy  Devices/Methods Used in Placement*:  Mask  Intubation Procedure*:  Preoxygenation, ETCO2, Atraumatic, Dentition Unchanged and Pharynx Clear  Insertion Site:  Oral  Blade Type*:  Yip  Blade Size*:  3  Cuff Volume (mL):  8  Measured from*:  Lips  Secured at (cm)*:  21  Placement Verified by: auscultation and capnometry    Glottic View*:  1 - full view of glottis  Attempts*:  1   Patient Identified, Procedure confirmed, Emergency equipment available and Safety protocols followed  Location:  OR  Urgency:  Elective  Difficult Airway: No    Indications for Airway Management:  Anesthesia  Mask Difficulty Assessment:  1 - vent by mask  Start Time: 6/11/2025 7:47 AM       INTERVENTIONAL RADIOLOGY PROCEDURE NOTE    Date: 7/14/2025    Procedure:   Procedure Summary       Date: 07/14/25 Room / Location: [facility] Interventional Radiology    Anesthesia Start:  Anesthesia Stop:     Procedure: IR DRAINAGE TUBE CHECK WITH SCLEROSIS Diagnosis:       Abdominal wall seroma, subsequent encounter      (post op seroma)    Scheduled Providers: Issa Leonard Responsible Provider:     Anesthesia Type: Not recorded ASA Status: Not recorded            Preoperative diagnosis:   1. Abdominal wall seroma, subsequent encounter         Postoperative diagnosis: Same.    Surgeon: Issa Leonard     Assistant: None. No qualified resident was available.    Blood loss: 0    Specimens: 0     Findings: abdominal drainage catheter in position with persistent output ~60 cc daily. Contrast injection with persistent collection. Infused 100 mg Doxycycline in 10 cc saline (capped for 2 hrs). Instructed to reduce flushes to 5 mL daily from 10 mL daily. Return to IR in 1-2 weeks.     Complications: None immediate.    Anesthesia: none

## 2025-07-14 NOTE — DISCHARGE INSTRUCTIONS
"                                                                      TUBE CARE INSTRUCTIONS    Care after your procedure:    Doxycycline given intra abd drainage catheter to indwell for 2 hours the re-attach bulb.    Resume your normal diet. Small sips of flat soda will help with nausea.    1. The properly functioning catheter should be forward flushed once (1x) daily with 5ml of normal saline using clean technique. You will be given a prescription for flushes.   To flush the tube, clean both connections with alcohol swab.Twist off the drainage bag/ bulb  tubing and twist the saline syringe into the drainage tube and flush. Remove the syringe and twist the drainage bag / bulb tubing tubing back on.    2. The drainage bag/bulb may be emptied as necessary. Keep a record of the amount of fluid you drain from your tube. This should be done with clean technique as well.     3. A fresh dressing should be applied daily over the tube insertion site.     4. As the tube is secured to the skin with only a suture,try not to pull on your tube. Tub baths are not permitted. Showers are permitted if the patient's skin entry site is prevented from getting wet. Similarly, washcloth \"baths\" are acceptable.     Contact Interventional Radiology at [phone] if:    1. Leakage or large amounts of liquid around the catheter.    2. Fever of 101 degrees lasting several hours without other obvious cause (such as sore throat, flu, etc).    3. Persistent nausea or vomiting.    4. Diminished drainage, which may be associated with pressure or pain. Or when the     drainage from your tube is less than 10mls for 48 hours.    5. Catheter pulled back or falls out.      The following pharmacies carry the flush syringes.       [facility]                     [facility]                             [facility] [address]                     [address] [place] [address]                         [phone]  [place]                   "     [place]  Phone [phone]            Phone [phone]                                                                                                       Aaron Daigle   [facility]             [facility]                                [phone]  [address] [place] [address] [place]  Phone [phone]            Phone [phone]                      [facility]                                                                                                          [phone]  [facility]  [address]  [place]                                                                               [facility]  Phone [phone]

## 2025-07-18 DIAGNOSIS — N30.10 INTERSTITIAL CYSTITIS: ICD-10-CM

## 2025-07-18 RX ORDER — PHENAZOPYRIDINE HYDROCHLORIDE 200 MG/1
200 TABLET, FILM COATED ORAL
Qty: 90 TABLET | Refills: 1 | Status: SHIPPED | OUTPATIENT
Start: 2025-07-18

## 2025-07-21 DIAGNOSIS — R11.0 NAUSEA: ICD-10-CM

## 2025-07-22 RX ORDER — ONDANSETRON 4 MG/1
4 TABLET, ORALLY DISINTEGRATING ORAL EVERY 8 HOURS PRN
Qty: 21 TABLET | Refills: 1 | Status: SHIPPED | OUTPATIENT
Start: 2025-07-22

## 2025-07-28 ENCOUNTER — HOSPITAL ENCOUNTER (OUTPATIENT)
Dept: RADIOLOGY | Facility: HOSPITAL | Age: 65
Discharge: HOME | End: 2025-07-28
Attending: STUDENT IN AN ORGANIZED HEALTH CARE EDUCATION/TRAINING PROGRAM
Payer: COMMERCIAL

## 2025-07-28 DIAGNOSIS — K55.9 COLONIC ISCHEMIA (HCC): ICD-10-CM

## 2025-07-28 DIAGNOSIS — K55.069 MESENTERIC ARTERY THROMBOSIS (HCC): Primary | ICD-10-CM

## 2025-07-28 PROCEDURE — 49423 EXCHANGE DRAINAGE CATHETER: CPT | Performed by: RADIOLOGY

## 2025-07-28 PROCEDURE — 49423 EXCHANGE DRAINAGE CATHETER: CPT

## 2025-07-28 PROCEDURE — 75984 XRAY CONTROL CATHETER CHANGE: CPT | Performed by: RADIOLOGY

## 2025-07-28 PROCEDURE — 75984 XRAY CONTROL CATHETER CHANGE: CPT

## 2025-07-28 PROCEDURE — 49185 SCLEROTX FLUID COLLECTION: CPT

## 2025-07-28 PROCEDURE — 49185 SCLEROTX FLUID COLLECTION: CPT | Performed by: RADIOLOGY

## 2025-07-28 PROCEDURE — C1729 CATH, DRAINAGE: HCPCS

## 2025-07-28 PROCEDURE — C1769 GUIDE WIRE: HCPCS

## 2025-07-28 RX ADMIN — DOXYCYCLINE 200 MG: 100 INJECTION, POWDER, LYOPHILIZED, FOR SOLUTION INTRAVENOUS at 12:25

## 2025-07-28 RX ADMIN — IOHEXOL 15 ML: 350 INJECTION, SOLUTION INTRAVENOUS at 12:34

## 2025-07-29 ENCOUNTER — OFFICE VISIT (OUTPATIENT)
Dept: SURGERY | Facility: CLINIC | Age: 65
End: 2025-07-29
Payer: COMMERCIAL

## 2025-07-29 VITALS
BODY MASS INDEX: 33.29 KG/M2 | SYSTOLIC BLOOD PRESSURE: 119 MMHG | OXYGEN SATURATION: 96 % | WEIGHT: 195 LBS | HEIGHT: 64 IN | TEMPERATURE: 95.1 F | DIASTOLIC BLOOD PRESSURE: 78 MMHG | HEART RATE: 78 BPM

## 2025-07-29 DIAGNOSIS — S30.1XXA ABDOMINAL WALL SEROMA: Primary | ICD-10-CM

## 2025-07-29 DIAGNOSIS — N18.9 CKD (CHRONIC KIDNEY DISEASE): ICD-10-CM

## 2025-07-29 PROCEDURE — 99213 OFFICE O/P EST LOW 20 MIN: CPT | Performed by: PHYSICIAN ASSISTANT

## 2025-07-31 ENCOUNTER — TELEMEDICINE (OUTPATIENT)
Dept: NEUROLOGY | Facility: CLINIC | Age: 65
End: 2025-07-31
Payer: COMMERCIAL

## 2025-07-31 DIAGNOSIS — R79.0 LOW FERRITIN: ICD-10-CM

## 2025-07-31 DIAGNOSIS — G47.33 OSA (OBSTRUCTIVE SLEEP APNEA): ICD-10-CM

## 2025-07-31 DIAGNOSIS — G43.709 CHRONIC MIGRAINE WITHOUT AURA WITHOUT STATUS MIGRAINOSUS, NOT INTRACTABLE: Primary | ICD-10-CM

## 2025-07-31 PROCEDURE — 99215 OFFICE O/P EST HI 40 MIN: CPT | Performed by: PSYCHIATRY & NEUROLOGY

## 2025-07-31 RX ORDER — LANOLIN ALCOHOL/MO/W.PET/CERES
400 CREAM (GRAM) TOPICAL
Qty: 90 TABLET | Refills: 4 | Status: SHIPPED | OUTPATIENT
Start: 2025-07-31

## 2025-08-02 ENCOUNTER — APPOINTMENT (OUTPATIENT)
Dept: LAB | Facility: HOSPITAL | Age: 65
End: 2025-08-02
Payer: COMMERCIAL

## 2025-08-02 DIAGNOSIS — E78.5 DYSLIPIDEMIA: ICD-10-CM

## 2025-08-02 DIAGNOSIS — N18.9 CKD (CHRONIC KIDNEY DISEASE): ICD-10-CM

## 2025-08-02 DIAGNOSIS — N18.9 CHRONIC KIDNEY DISEASE, UNSPECIFIED CKD STAGE: ICD-10-CM

## 2025-08-02 DIAGNOSIS — R79.0 LOW FERRITIN: ICD-10-CM

## 2025-08-02 PROBLEM — G93.2 IIH (IDIOPATHIC INTRACRANIAL HYPERTENSION): Status: RESOLVED | Noted: 2023-07-19 | Resolved: 2025-08-02

## 2025-08-02 LAB
BUN SERPL-MCNC: 17 MG/DL (ref 5–25)
CHOLEST SERPL-MCNC: 160 MG/DL (ref ?–200)
CREAT SERPL-MCNC: 0.85 MG/DL (ref 0.6–1.3)
FERRITIN SERPL-MCNC: 32 NG/ML (ref 30–307)
GFR SERPL CREATININE-BSD FRML MDRD: 71 ML/MIN/1.73SQ M
HDLC SERPL-MCNC: 47 MG/DL
LDLC SERPL CALC-MCNC: 69 MG/DL (ref 0–100)
LDLC SERPL DIRECT ASSAY-MCNC: 92 MG/DL (ref 0–100)
NONHDLC SERPL-MCNC: 113 MG/DL
TRIGL SERPL-MCNC: 221 MG/DL (ref ?–150)

## 2025-08-02 PROCEDURE — 36415 COLL VENOUS BLD VENIPUNCTURE: CPT

## 2025-08-02 PROCEDURE — 80061 LIPID PANEL: CPT

## 2025-08-02 PROCEDURE — 82728 ASSAY OF FERRITIN: CPT

## 2025-08-02 PROCEDURE — 83721 ASSAY OF BLOOD LIPOPROTEIN: CPT

## 2025-08-02 PROCEDURE — 84520 ASSAY OF UREA NITROGEN: CPT

## 2025-08-02 PROCEDURE — 82565 ASSAY OF CREATININE: CPT

## 2025-08-04 ENCOUNTER — OFFICE VISIT (OUTPATIENT)
Age: 65
End: 2025-08-04
Payer: COMMERCIAL

## 2025-08-04 ENCOUNTER — HOSPITAL ENCOUNTER (OUTPATIENT)
Dept: CT IMAGING | Facility: HOSPITAL | Age: 65
Discharge: HOME | End: 2025-08-04
Attending: PHYSICIAN ASSISTANT
Payer: COMMERCIAL

## 2025-08-04 VITALS
DIASTOLIC BLOOD PRESSURE: 82 MMHG | TEMPERATURE: 97.3 F | HEIGHT: 64 IN | BODY MASS INDEX: 32.78 KG/M2 | WEIGHT: 192 LBS | HEART RATE: 74 BPM | OXYGEN SATURATION: 96 % | SYSTOLIC BLOOD PRESSURE: 136 MMHG

## 2025-08-04 DIAGNOSIS — R10.84 GENERALIZED ABDOMINAL PAIN: Primary | ICD-10-CM

## 2025-08-04 DIAGNOSIS — E08.49 DIABETES DUE TO UNDRL CONDITION W OTH DIABETIC NEURO COMP (HCC): ICD-10-CM

## 2025-08-04 DIAGNOSIS — S30.1XXA ABDOMINAL WALL SEROMA: ICD-10-CM

## 2025-08-04 PROCEDURE — 99214 OFFICE O/P EST MOD 30 MIN: CPT | Performed by: FAMILY MEDICINE

## 2025-08-04 PROCEDURE — 74177 CT ABD & PELVIS W/CONTRAST: CPT

## 2025-08-04 RX ORDER — SODIUM CHLORIDE 0.9 % (FLUSH) 0.9 %
10 SYRINGE (ML) INJECTION DAILY
COMMUNITY
Start: 2025-07-22

## 2025-08-04 RX ADMIN — IOHEXOL 100 ML: 350 INJECTION, SOLUTION INTRAVENOUS at 12:00

## 2025-08-05 ENCOUNTER — OFFICE VISIT (OUTPATIENT)
Dept: SURGERY | Facility: CLINIC | Age: 65
End: 2025-08-05
Attending: FAMILY MEDICINE
Payer: COMMERCIAL

## 2025-08-05 ENCOUNTER — DOCUMENTATION (OUTPATIENT)
Dept: CARDIOLOGY CLINIC | Facility: CLINIC | Age: 65
End: 2025-08-05

## 2025-08-05 ENCOUNTER — TELEPHONE (OUTPATIENT)
Age: 65
End: 2025-08-05

## 2025-08-05 VITALS
DIASTOLIC BLOOD PRESSURE: 80 MMHG | OXYGEN SATURATION: 97 % | BODY MASS INDEX: 32.78 KG/M2 | SYSTOLIC BLOOD PRESSURE: 130 MMHG | HEART RATE: 66 BPM | TEMPERATURE: 98.3 F | HEIGHT: 64 IN | WEIGHT: 192 LBS

## 2025-08-05 DIAGNOSIS — R10.84 GENERALIZED ABDOMINAL PAIN: ICD-10-CM

## 2025-08-05 DIAGNOSIS — L76.34 POSTOPERATIVE SEROMA OF SUBCUTANEOUS TISSUE AFTER NON-DERMATOLOGIC PROCEDURE: Primary | ICD-10-CM

## 2025-08-05 DIAGNOSIS — R11.0 NAUSEA: ICD-10-CM

## 2025-08-05 DIAGNOSIS — E11.9 DIABETES (HCC): ICD-10-CM

## 2025-08-05 PROCEDURE — 99213 OFFICE O/P EST LOW 20 MIN: CPT | Performed by: SPECIALIST

## 2025-08-05 RX ORDER — PROMETHAZINE HYDROCHLORIDE 25 MG/1
25 TABLET ORAL EVERY 6 HOURS PRN
Qty: 120 TABLET | Refills: 0 | Status: SHIPPED | OUTPATIENT
Start: 2025-08-05 | End: 2025-09-04

## 2025-08-05 RX ORDER — LANCETS
EACH MISCELLANEOUS
Qty: 100 EACH | Refills: 1 | Status: SHIPPED | OUTPATIENT
Start: 2025-08-05

## 2025-08-13 ENCOUNTER — OFFICE VISIT (OUTPATIENT)
Dept: SURGERY | Facility: CLINIC | Age: 65
End: 2025-08-13
Payer: COMMERCIAL

## 2025-08-14 ENCOUNTER — TELEPHONE (OUTPATIENT)
Age: 65
End: 2025-08-14

## 2025-08-14 ENCOUNTER — HOSPITAL ENCOUNTER (OUTPATIENT)
Dept: RADIOLOGY | Facility: HOSPITAL | Age: 65
Discharge: HOME | End: 2025-08-14
Attending: RADIOLOGY
Payer: COMMERCIAL

## 2025-08-15 ENCOUNTER — NURSE TRIAGE (OUTPATIENT)
Age: 65
End: 2025-08-15

## 2025-08-20 ENCOUNTER — TELEPHONE (OUTPATIENT)
Age: 65
End: 2025-08-20

## 2025-08-21 ENCOUNTER — HOSPITAL ENCOUNTER (OUTPATIENT)
Dept: RADIOLOGY | Facility: HOSPITAL | Age: 65
Discharge: HOME | End: 2025-08-21
Attending: RADIOLOGY | Admitting: RADIOLOGY
Payer: COMMERCIAL

## 2025-08-21 DIAGNOSIS — Z87.19 STATUS POST HERNIA REPAIR: ICD-10-CM

## 2025-08-21 DIAGNOSIS — Z87.19 STATUS POST REPAIR OF VENTRAL HERNIA: ICD-10-CM

## 2025-08-21 DIAGNOSIS — Z98.890 STATUS POST REPAIR OF VENTRAL HERNIA: ICD-10-CM

## 2025-08-21 DIAGNOSIS — Z98.890 STATUS POST HERNIA REPAIR: ICD-10-CM

## 2025-08-21 PROCEDURE — 49185 SCLEROTX FLUID COLLECTION: CPT

## 2025-08-21 RX ADMIN — IOHEXOL 5 ML: 350 INJECTION, SOLUTION INTRAVENOUS at 15:38

## 2025-08-21 RX ADMIN — DOXYCYCLINE 300 MG: 100 INJECTION, POWDER, LYOPHILIZED, FOR SOLUTION INTRAVENOUS at 15:18

## (undated) DEVICE — SNAP KOVER: Brand: UNBRANDED

## (undated) DEVICE — 3000CC GUARDIAN II: Brand: GUARDIAN

## (undated) DEVICE — NEEDLE HYPO 22G X 1-1/2 IN

## (undated) DEVICE — ANTI-FOG SOLUTION WITH FOAM PAD: Brand: DEVON

## (undated) DEVICE — DRAPE PROBE NEO-PROBE/ULTRASOUND

## (undated) DEVICE — 40529 DERMAPROX PAD 11'' X 15'' X 1'': Brand: 40529 DERMAPROX PAD 11'' X 15'' X 1''

## (undated) DEVICE — PACK PBDS LAP CHOLE RF

## (undated) DEVICE — SUT SILK 4-0 30 IN A303H

## (undated) DEVICE — NEPTUNE E-SEP SMOKE EVACUATION PENCIL, COATED, 70MM BLADE, PUSH BUTTON SWITCH: Brand: NEPTUNE E-SEP

## (undated) DEVICE — SUT PROLENE 2-0 MO-6 30 IN 8417H

## (undated) DEVICE — SUT PROLENE 6-0 BV130 30 IN 8709H

## (undated) DEVICE — 3M™ IOBAN™ 2 ANTIMICROBIAL INCISE DRAPE 6650EZ: Brand: IOBAN™ 2

## (undated) DEVICE — THYROID SHEET: Brand: CONVERTORS

## (undated) DEVICE — BETHLEHEM UNIVERSAL MINOR GEN: Brand: CARDINAL HEALTH

## (undated) DEVICE — ENSEAL X1 TISSUE SEALER, CURVED JAW, 37 CM SHAFT LENGTH: Brand: ENSEAL

## (undated) DEVICE — BINDER ABDOMINAL 63-74 IN

## (undated) DEVICE — SUT PROLENE 5-0 BV-1 24 IN 9702H

## (undated) DEVICE — BETHL CAROTID ENDARTERECTOMY: Brand: CARDINAL HEALTH

## (undated) DEVICE — LIGACLIP MCA MULTIPLE CLIP APPLIERS, 20 SMALL CLIPS: Brand: LIGACLIP

## (undated) DEVICE — SPONGE LAP 18 X 18 IN STRL RFD

## (undated) DEVICE — SURGICEL FIBRILLAR 1 X 2

## (undated) DEVICE — PENCIL ELECTROSURG E-Z CLEAN -0035H

## (undated) DEVICE — WOUND RETRACTOR AND PROTECTOR: Brand: ALEXIS O WOUND PROTECTOR-RETRACTOR

## (undated) DEVICE — LIGACLIP MCA MULTIPLE CLIP APPLIERS, 20 MEDIUM CLIPS: Brand: LIGACLIP

## (undated) DEVICE — MEDI-VAC YANKAUER SUCTION HANDLE W/BULBOUS AND CONTROL VENT: Brand: CARDINAL HEALTH

## (undated) DEVICE — JACKSON-PRATT 100CC BULB RESERVOIR: Brand: CARDINAL HEALTH

## (undated) DEVICE — SCD SEQUENTIAL COMPRESSION COMFORT SLEEVE MEDIUM KNEE LENGTH: Brand: KENDALL SCD

## (undated) DEVICE — SUT MONOCRYL 3-0 SH 27 IN Y416H

## (undated) DEVICE — GLOVE INDICATOR PI UNDERGLOVE SZ 6.5 BLUE

## (undated) DEVICE — ULTRASOUND GEL STERILE FOIL PK

## (undated) DEVICE — PROXIMATE SKIN STAPLERS (35 WIDE) CONTAINS 35 STAINLESS STEEL STAPLES (FIXED HEAD): Brand: PROXIMATE

## (undated) DEVICE — PLUMEPEN PRO 10FT

## (undated) DEVICE — 2000CC GUARDIAN II: Brand: GUARDIAN

## (undated) DEVICE — 2963 MEDIPORE SOFT CLOTH TAPE 3 IN X 10 YD 12 RLS/CS: Brand: 3M™ MEDIPORE™

## (undated) DEVICE — DRAPE EQUIPMENT RF WAND

## (undated) DEVICE — HARMONIC ACE 5MM DIAMETER SHEARS 36CM SHAFT LENGTH + ADAPTIVE TISSUE TECHNOLOGY FOR USE WITH GENERATOR G11: Brand: HARMONIC ACE

## (undated) DEVICE — SYRINGE 10ML LL

## (undated) DEVICE — SYRINGE CATH TIP 50ML

## (undated) DEVICE — ADHESIVE SKIN HIGH VISCOSITY EXOFIN 1ML

## (undated) DEVICE — VESSEL LOOPS X-RAY DETECTABLE: Brand: DEROYAL

## (undated) DEVICE — PETRI DISH STERILE

## (undated) DEVICE — UMBILICAL TAPE: Brand: DEROYAL

## (undated) DEVICE — DRESSING MEPILEX AG BORDER POST-OP 4 X 12 IN

## (undated) DEVICE — MICROPUNCTURE 501

## (undated) DEVICE — SUT SILK 2-0 30 IN A305H

## (undated) DEVICE — POOLE SUCTION HANDLE: Brand: CARDINAL HEALTH

## (undated) DEVICE — SUT VICRYL 0 CT-1 36 IN J946H

## (undated) DEVICE — CHLORAPREP HI-LITE 26ML ORANGE

## (undated) DEVICE — INTENDED FOR TISSUE SEPARATION, AND OTHER PROCEDURES THAT REQUIRE A SHARP SURGICAL BLADE TO PUNCTURE OR CUT.: Brand: BARD-PARKER SAFETY BLADES SIZE 10, STERILE

## (undated) DEVICE — ECHELON CIRCULAR POWERED STAPLER: Brand: ECHELON CIRCULAR

## (undated) DEVICE — INSTRUMENT POUCH: Brand: CONVERTORS

## (undated) DEVICE — TROCAR: Brand: KII FIOS FIRST ENTRY

## (undated) DEVICE — GLOVE INDICATOR PI UNDERGLOVE SZ 8 BLUE

## (undated) DEVICE — IV CATH 16 G SAFETY

## (undated) DEVICE — FLUID MANAGEMENT KIT - IR

## (undated) DEVICE — TISSUE RETRIEVAL SYSTEM: Brand: INZII RETRIEVAL SYSTEM

## (undated) DEVICE — NEEDLE 25G X 1 1/2

## (undated) DEVICE — SUT PROLENE 2-0 SH 36 IN 8523H

## (undated) DEVICE — JP CHAN DRN SIL HUBLESS 15FR W/TRO: Brand: CARDINAL HEALTH

## (undated) DEVICE — SUT ETHIBOND 0 SH 30 IN X834H

## (undated) DEVICE — ENDOPATH ETS-FLEX45 ARTICULATING ENDOSCOPIC LINEAR CUTTER, NO RELOAD: Brand: ENDOPATH

## (undated) DEVICE — 3M™ STERI-STRIP™ REINFORCED ADHESIVE SKIN CLOSURES, R1547, 1/2 IN X 4 IN (12 MM X 100 MM), 6 STRIPS/ENVELOPE: Brand: 3M™ STERI-STRIP™

## (undated) DEVICE — SLIM BODY SKIN STAPLER: Brand: APPOSE ULC

## (undated) DEVICE — SUT PROLENE 7-0 BV 175-6 24 IN 8735H

## (undated) DEVICE — 3M™ TEGADERM™ TRANSPARENT FILM DRESSING FRAME STYLE, 1627, 4 IN X 10 IN (10 CM X 25 CM), 20/CT 4CT/CASE: Brand: 3M™ TEGADERM™

## (undated) DEVICE — GLOVE SRG BIOGEL 6.5

## (undated) DEVICE — ETS45 RELOAD STANDARD 45MM: Brand: ENDOPATH

## (undated) DEVICE — TRAY FOLEY 16FR URIMETER SURESTEP

## (undated) DEVICE — SUT PDS II 2-0 SH 27 IN Z317H

## (undated) DEVICE — SMOKE EVACUATION TUBING WITH 7/8 IN TO 1/4 IN REDUCER: Brand: BUFFALO FILTER

## (undated) DEVICE — PROXIMATE LINEAR CUTTER RELOAD, BLUE, 75MM: Brand: PROXIMATE

## (undated) DEVICE — TROCAR: Brand: KII® SLEEVE

## (undated) DEVICE — ENDOPATH PNEUMONEEDLE INSUFFLATION NEEDLES WITH LUER LOCK CONNECTORS 120MM: Brand: ENDOPATH

## (undated) DEVICE — PROXIMATE RELOADABLE LINEAR STAPLER: Brand: PROXIMATE

## (undated) DEVICE — SUT PDS II 3-0 SH 27 IN Z316H

## (undated) DEVICE — PROXIMATE RELOADABLE LINEAR CUTTER WITH SAFETY LOCK-OUT, 75MM: Brand: PROXIMATE

## (undated) DEVICE — AVIATOR PLUS .014 5.0X30 142CM: Brand: AVIATOR

## (undated) DEVICE — SUT VICRYL 2-0 SH 27 IN UNDYED J417H

## (undated) DEVICE — TUBING SUCTION 5MM X 12 FT

## (undated) DEVICE — BULB SYRINGE,IRRIGATION WITH PROTECTIVE CAP: Brand: DOVER

## (undated) DEVICE — ELECTRODE BLADE E-Z CLEAN 2.75IN 7CM -0012A

## (undated) DEVICE — ANGLED-TIP ARTERIAL SHEATH CONFIGURATION: Brand: ENROUTE TRANSCAROTID NEUROPROTECTION SYSTEM

## (undated) DEVICE — BAG DECANTER

## (undated) DEVICE — SUT MONOCRYL 4-0 PS-2 27 IN Y426H

## (undated) DEVICE — TUBING SMOKE EVAC W/FILTRATION DEVICE PLUMEPORT ACTIV

## (undated) DEVICE — INTRODUCER KIT MICO 4FR 21G X 7CM

## (undated) DEVICE — SUT VICRYL 2-0 CT-1 36 IN J945H

## (undated) DEVICE — SUT ETHILON 3-0 PS-1 18 IN 1663G

## (undated) DEVICE — STERILE ICS CARDIOVASCULAR PK: Brand: CARDINAL HEALTH

## (undated) DEVICE — Device

## (undated) DEVICE — REM POLYHESIVE ADULT PATIENT RETURN ELECTRODE: Brand: VALLEYLAB

## (undated) DEVICE — LAPAROSCOPIC TROCAR SLEEVE/SINGLE USE: Brand: KII® OPTICAL ACCESS SYSTEM

## (undated) DEVICE — LAPAROTOMY DRAPE WITH POUCHES: Brand: CONVERTORS

## (undated) DEVICE — GLOVE SRG BIOGEL 7.5

## (undated) DEVICE — SUT SILK 3-0 SH CR/8 18 IN C013D

## (undated) DEVICE — SUT MONOCRYL 4-0 PS-2 18 IN Y496G

## (undated) DEVICE — GLOVE SRG BIOGEL ECLIPSE 7.5

## (undated) DEVICE — SUT SILK 3-0 30 IN A304H

## (undated) DEVICE — DECANTER: Brand: UNBRANDED

## (undated) DEVICE — SINGLE PORT MANIFOLD: Brand: NEPTUNE 2

## (undated) DEVICE — GUIDEWIRE THRUWAY 0.014 IN 130CM LONG STR

## (undated) DEVICE — SUT SILK 0 CT-1 30 IN 424H

## (undated) DEVICE — MAYO STAND COVER: Brand: CONVERTORS

## (undated) DEVICE — PRESTO™ INFLATION DEVICE: Brand: PRESTO

## (undated) DEVICE — Device: Brand: OMNICLOSE TROCAR SITE CLOSURE DEVICE

## (undated) DEVICE — ANTIBACTERIAL UNDYED BRAIDED (POLYGLACTIN 910), SYNTHETIC ABSORBABLE SUTURE: Brand: COATED VICRYL

## (undated) DEVICE — 4-PORT MANIFOLD: Brand: NEPTUNE 2

## (undated) DEVICE — INTENDED FOR TISSUE SEPARATION, AND OTHER PROCEDURES THAT REQUIRE A SHARP SURGICAL BLADE TO PUNCTURE OR CUT.: Brand: BARD-PARKER SAFETY BLADES SIZE 11, STERILE

## (undated) DEVICE — [HIGH FLOW INSUFFLATOR,  DO NOT USE IF PACKAGE IS DAMAGED,  KEEP DRY,  KEEP AWAY FROM SUNLIGHT,  PROTECT FROM HEAT AND RADIOACTIVE SOURCES.]: Brand: PNEUMOSURE

## (undated) DEVICE — PROBE COVER: Brand: STERILE PROBE COVER

## (undated) DEVICE — EXOFIN PRECISION PEN HIGH VISCOSITY TOPICAL SKIN ADHESIVE: Brand: EXOFIN PRECISION PEN, 1G